# Patient Record
Sex: FEMALE | Race: WHITE | NOT HISPANIC OR LATINO | Employment: OTHER | URBAN - METROPOLITAN AREA
[De-identification: names, ages, dates, MRNs, and addresses within clinical notes are randomized per-mention and may not be internally consistent; named-entity substitution may affect disease eponyms.]

---

## 2017-01-03 ENCOUNTER — INFUSION THERAPY VISIT (OUTPATIENT)
Dept: INFUSION THERAPY | Facility: CLINIC | Age: 67
End: 2017-01-03
Attending: INTERNAL MEDICINE
Payer: MEDICARE

## 2017-01-03 ENCOUNTER — ONCOLOGY VISIT (OUTPATIENT)
Dept: ONCOLOGY | Facility: CLINIC | Age: 67
End: 2017-01-03
Payer: COMMERCIAL

## 2017-01-03 VITALS
HEIGHT: 66 IN | HEART RATE: 96 BPM | DIASTOLIC BLOOD PRESSURE: 60 MMHG | WEIGHT: 138.8 LBS | OXYGEN SATURATION: 97 % | SYSTOLIC BLOOD PRESSURE: 132 MMHG | RESPIRATION RATE: 16 BRPM | BODY MASS INDEX: 22.31 KG/M2 | TEMPERATURE: 97.8 F

## 2017-01-03 VITALS — HEART RATE: 88 BPM | SYSTOLIC BLOOD PRESSURE: 137 MMHG | DIASTOLIC BLOOD PRESSURE: 73 MMHG

## 2017-01-03 DIAGNOSIS — C50.412 MALIGNANT NEOPLASM OF UPPER-OUTER QUADRANT OF LEFT FEMALE BREAST (H): Primary | ICD-10-CM

## 2017-01-03 DIAGNOSIS — Z51.11 ENCOUNTER FOR ANTINEOPLASTIC CHEMOTHERAPY: Primary | ICD-10-CM

## 2017-01-03 DIAGNOSIS — F43.21 ADJUSTMENT DISORDER WITH DEPRESSED MOOD: Primary | ICD-10-CM

## 2017-01-03 DIAGNOSIS — C50.412 MALIGNANT NEOPLASM OF UPPER-OUTER QUADRANT OF LEFT FEMALE BREAST (H): ICD-10-CM

## 2017-01-03 DIAGNOSIS — Z51.11 ENCOUNTER FOR ANTINEOPLASTIC CHEMOTHERAPY: ICD-10-CM

## 2017-01-03 LAB
ALBUMIN SERPL-MCNC: 4 G/DL (ref 3.4–5)
ALP SERPL-CCNC: 129 U/L (ref 40–150)
ALT SERPL W P-5'-P-CCNC: 53 U/L (ref 0–50)
ANION GAP SERPL CALCULATED.3IONS-SCNC: 9 MMOL/L (ref 3–14)
AST SERPL W P-5'-P-CCNC: 29 U/L (ref 0–45)
BASOPHILS # BLD AUTO: 0 10E9/L (ref 0–0.2)
BASOPHILS NFR BLD AUTO: 0.1 %
BILIRUB SERPL-MCNC: 0.2 MG/DL (ref 0.2–1.3)
BUN SERPL-MCNC: 20 MG/DL (ref 7–30)
CALCIUM SERPL-MCNC: 9.5 MG/DL (ref 8.5–10.1)
CHLORIDE SERPL-SCNC: 104 MMOL/L (ref 94–109)
CO2 SERPL-SCNC: 26 MMOL/L (ref 20–32)
CREAT SERPL-MCNC: 0.77 MG/DL (ref 0.52–1.04)
DIFFERENTIAL METHOD BLD: ABNORMAL
EOSINOPHIL # BLD AUTO: 0 10E9/L (ref 0–0.7)
EOSINOPHIL NFR BLD AUTO: 0 %
ERYTHROCYTE [DISTWIDTH] IN BLOOD BY AUTOMATED COUNT: 16.5 % (ref 10–15)
GFR SERPL CREATININE-BSD FRML MDRD: 75 ML/MIN/1.7M2
GLUCOSE SERPL-MCNC: 126 MG/DL (ref 70–99)
HCT VFR BLD AUTO: 33.1 % (ref 35–47)
HGB BLD-MCNC: 10.5 G/DL (ref 11.7–15.7)
IMM GRANULOCYTES # BLD: 0 10E9/L (ref 0–0.4)
IMM GRANULOCYTES NFR BLD: 0.3 %
LYMPHOCYTES # BLD AUTO: 0.4 10E9/L (ref 0.8–5.3)
LYMPHOCYTES NFR BLD AUTO: 2.9 %
MCH RBC QN AUTO: 31.3 PG (ref 26.5–33)
MCHC RBC AUTO-ENTMCNC: 31.7 G/DL (ref 31.5–36.5)
MCV RBC AUTO: 99 FL (ref 78–100)
MONOCYTES # BLD AUTO: 0 10E9/L (ref 0–1.3)
MONOCYTES NFR BLD AUTO: 0.2 %
NEUTROPHILS # BLD AUTO: 12.8 10E9/L (ref 1.6–8.3)
NEUTROPHILS NFR BLD AUTO: 96.5 %
PLATELET # BLD AUTO: 343 10E9/L (ref 150–450)
POTASSIUM SERPL-SCNC: 3.9 MMOL/L (ref 3.4–5.3)
PROT SERPL-MCNC: 7.4 G/DL (ref 6.8–8.8)
RBC # BLD AUTO: 3.36 10E12/L (ref 3.8–5.2)
SODIUM SERPL-SCNC: 139 MMOL/L (ref 133–144)
WBC # BLD AUTO: 13.3 10E9/L (ref 4–11)

## 2017-01-03 PROCEDURE — 25000128 H RX IP 250 OP 636: Performed by: INTERNAL MEDICINE

## 2017-01-03 PROCEDURE — 96375 TX/PRO/DX INJ NEW DRUG ADDON: CPT

## 2017-01-03 PROCEDURE — 99214 OFFICE O/P EST MOD 30 MIN: CPT | Performed by: INTERNAL MEDICINE

## 2017-01-03 PROCEDURE — 96415 CHEMO IV INFUSION ADDL HR: CPT

## 2017-01-03 PROCEDURE — 96417 CHEMO IV INFUS EACH ADDL SEQ: CPT

## 2017-01-03 PROCEDURE — 96377 APPLICATON ON-BODY INJECTOR: CPT

## 2017-01-03 PROCEDURE — 25000125 ZZHC RX 250: Performed by: INTERNAL MEDICINE

## 2017-01-03 PROCEDURE — 80053 COMPREHEN METABOLIC PANEL: CPT | Performed by: INTERNAL MEDICINE

## 2017-01-03 PROCEDURE — 25000130 H RX MED GY IP 250 OP 259 PS 637: Mod: GY | Performed by: INTERNAL MEDICINE

## 2017-01-03 PROCEDURE — 96413 CHEMO IV INFUSION 1 HR: CPT

## 2017-01-03 PROCEDURE — 96367 TX/PROPH/DG ADDL SEQ IV INF: CPT

## 2017-01-03 PROCEDURE — 85025 COMPLETE CBC W/AUTO DIFF WBC: CPT | Performed by: INTERNAL MEDICINE

## 2017-01-03 RX ORDER — DIPHENHYDRAMINE HCL 25 MG
50 CAPSULE ORAL ONCE
Status: COMPLETED | OUTPATIENT
Start: 2017-01-03 | End: 2017-01-03

## 2017-01-03 RX ORDER — VENLAFAXINE HYDROCHLORIDE 37.5 MG/1
37.5 CAPSULE, EXTENDED RELEASE ORAL DAILY
Qty: 30 CAPSULE | Refills: 1 | Status: SHIPPED | OUTPATIENT
Start: 2017-01-03 | End: 2017-03-02

## 2017-01-03 RX ORDER — PROCHLORPERAZINE MALEATE 10 MG
10 TABLET ORAL EVERY 6 HOURS PRN
Qty: 30 TABLET | Refills: 3 | Status: SHIPPED | OUTPATIENT
Start: 2017-01-03 | End: 2017-02-06

## 2017-01-03 RX ORDER — HEPARIN SODIUM (PORCINE) LOCK FLUSH IV SOLN 100 UNIT/ML 100 UNIT/ML
500 SOLUTION INTRAVENOUS EVERY 8 HOURS
Status: DISCONTINUED | OUTPATIENT
Start: 2017-01-03 | End: 2017-01-03 | Stop reason: HOSPADM

## 2017-01-03 RX ADMIN — DEXAMETHASONE SODIUM PHOSPHATE 20 MG: 10 INJECTION, SOLUTION INTRAMUSCULAR; INTRAVENOUS at 11:19

## 2017-01-03 RX ADMIN — PACLITAXEL 300 MG: 6 INJECTION, SOLUTION INTRAVENOUS at 12:24

## 2017-01-03 RX ADMIN — Medication 126 MG: at 15:41

## 2017-01-03 RX ADMIN — SODIUM CHLORIDE, PRESERVATIVE FREE 500 UNITS: 5 INJECTION INTRAVENOUS at 16:29

## 2017-01-03 RX ADMIN — SODIUM CHLORIDE 250 ML: 9 INJECTION, SOLUTION INTRAVENOUS at 11:15

## 2017-01-03 RX ADMIN — PEGFILGRASTIM 6 MG: KIT SUBCUTANEOUS at 16:24

## 2017-01-03 RX ADMIN — FAMOTIDINE 20 MG: 20 INJECTION, SOLUTION INTRAVENOUS at 11:38

## 2017-01-03 RX ADMIN — DIPHENHYDRAMINE HYDROCHLORIDE 50 MG: 25 CAPSULE ORAL at 11:40

## 2017-01-03 ASSESSMENT — PAIN SCALES - GENERAL: PAINLEVEL: NO PAIN (0)

## 2017-01-03 NOTE — NURSING NOTE
DISCHARGE PLAN:  Next appointments: See patient instruction section  Departure Mode: Ambulatory  Accompanied by: friends  8 minutes for nursing discharge (face to face time)     Michaela Dorman is here today for Oncology follow up for breast cancer.  Writing nurse seen patient after Medical Oncology appointment to address questions/concerns/coordinate care. Patient to follow up with next cycle of chemotherapy.  Patient is scheduled for Mastectomy the first week of February and per Dr. Ferguson this is going to need to be moved back by one week.  Patient reports that she was off by one week when scheduling.  Message sent to surgery scheduler. Appointments scheduled. See patient instructions and Oncologist's Progress note for further details. Questions and concerns addressed to patient's satisfaction. Patient verbalized and demonstrated understanding of plan.  Contact information provided and patient is encouraged to call with any that arise in the interim of care.    Juan José Hernandez, RN, BSN, OCN   Oncology Care Coordinator RN  [facility]  [phone]  1/3/2017, 11:39 AM

## 2017-01-03 NOTE — ASSESSMENT & PLAN NOTE
Michaela Dorman  initially felt a mass in her left axilla. She then underwent mammogram which showed dense breasts with no suspicious lesions in 4/2016. She was then seen by surgery and underwent excisional LN biopsy that showed metastatic high-grade poorly differentiated carcinoma with a tumor size of 2.5 cm. Immunohistochemistry was done for ER/KS receptors that came back both negative. HER-2/boubacar was amplified by FISH. She then underwent MRI of the breast on 9/26/2016 that showed suspicious abnormality with multicentric left sided breast cancer that involved the left lateral breast form the nipple to the chest wall. A PET scan was obtained on 10/5/2016 that showed a hypermetabolic opacity in the left axilla wihtout other pathological activity. It was then recommended that she undergo neoadjuvant chemotherapy with Cytoxan and Doxorubicin that will be followed by Taxol, Herceptin. Patient initiated treatment on 10/11/2016.

## 2017-01-03 NOTE — PROGRESS NOTES
Hematology/ Oncology Follow-up Visit:  Sunny 3, 2017    Reason for Visit:   Chief Complaint   Patient presents with     Oncology Clinic Visit     2 week follow up- breast cancer       Oncologic History:  Malignant neoplasm of upper-outer quadrant of left female breast (H)  Michaela Dorman  initially felt a mass in her left axilla. She then underwent mammogram which showed dense breasts with no suspicious lesions in 4/2016. She was then seen by surgery and underwent excisional LN biopsy that showed metastatic high-grade poorly differentiated carcinoma with a tumor size of 2.5 cm. Immunohistochemistry was done for ER/FL receptors that came back both negative. HER-2/boubacar was amplified by FISH. She then underwent MRI of the breast on 9/26/2016 that showed suspicious abnormality with multicentric left sided breast cancer that involved the left lateral breast form the nipple to the chest wall. A PET scan was obtained on 10/5/2016 that showed a hypermetabolic opacity in the left axilla wihtout other pathological activity. It was then recommended that she undergo neoadjuvant chemotherapy with Cytoxan and Doxorubicin that will be followed by Taxol, Herceptin. Patient initiated treatment on 10/11/2016.         Interval History:  Patient is returning today for follow-up. She has been tolerating chemotherapy well without any significant complaints of nausea vomiting diarrhea or neuropathy. She is currently receiving Taxol and Herceptin.    Review Of Systems:  Constitutional: Negative for fever, chills, and night sweats.  Skin: negative.  Eyes: negative.  Ears/Nose/Throat: negative.  Respiratory: No shortness of breath, dyspnea on exertion, cough, or hemoptysis.  Cardiovascular: negative.  Gastrointestinal: negative.  Genitourinary: negative.  Musculoskeletal: negative.  Neurologic: negative.  Psychiatric: negative.  Hematologic/Lymphatic/Immunologic: negative.  Endocrine: negative.    All other ROS negative unless mentioned in  interval history.    Past medical, social, surgical, and family histories reviewed.    Allergies:  Allergies as of 01/03/2017 - reviewed 01/03/2017   Allergen Reaction Noted     No known drug allergies  07/05/2002       Current Medications:  Current Outpatient Prescriptions   Medication Sig Dispense Refill     venlafaxine (EFFEXOR XR) 37.5 MG 24 hr capsule Take 1 capsule (37.5 mg) by mouth daily 30 capsule 1     prochlorperazine (COMPAZINE) 10 MG tablet Take 1 tablet (10 mg) by mouth every 6 hours as needed (Nausea/Vomiting) 30 tablet 3     HYDROcodone-acetaminophen (NORCO) 5-325 MG per tablet Take 1 tablet by mouth every 4 hours as needed for pain Maximum 8 tablet(s) per day 18 tablet 0     LORazepam (ATIVAN) 0.5 MG tablet Take 1 tablet (0.5 mg) by mouth every 4 hours as needed (Anxiety, Nausea/Vomiting or Sleep) 30 tablet 3     hydrochlorothiazide (HYDRODIURIL) 25 MG tablet TAKE ONE TABLET BY MOUTH ONCE DAILY 90 tablet 1     dexamethasone (DECADRON) 4 MG tablet Take 20mg the night before and the morning of chemotherapy as discussed 30 tablet 0     [DISCONTINUED] venlafaxine (EFFEXOR XR) 37.5 MG 24 hr capsule Take 1 capsule (37.5 mg) by mouth daily 30 capsule 1     DPH-Lido-AlHydr-MgHydr-Simeth (FIRST-MOUTHWASH BLM) SUSP Swish and swallow 5-10 mLs in mouth every 6 hours as needed 237 mL 1     HYDROcodone-acetaminophen (NORCO) 5-325 MG per tablet Take 1-2 tablets by mouth every 4 hours as needed for other (Moderate to Severe Pain) 30 tablet 0     psyllium 0.52 G capsule Take 5 capsules by mouth daily       simvastatin (ZOCOR) 20 MG tablet Take 1 tablet (20 mg) by mouth At Bedtime 90 tablet 1     FLUoxetine (PROZAC) 20 MG capsule TAKE THREE CAPSULES BY MOUTH EVERY DAY TITRATE AS DIRECTED 270 capsule 0     acetaminophen (TYLENOL EX ST ARTHRITIS PAIN) 500 MG tablet Take 500-1,000 mg by mouth every 6 hours as needed for mild pain       omeprazole (PRILOSEC) 20 MG capsule Take by mouth daily       pegfilgrastim  "(NEULASTA) 6 MG/0.6ML injection Inject 0.6 mLs (6 mg) Subcutaneous once for 1 dose 0.6 mL 0        Physical Exam:  /60 mmHg  Pulse 96  Temp(Src) 97.8  F (36.6  C) (Oral)  Resp 16  Ht 1.664 m (5' 5.5\")  Wt 62.959 kg (138 lb 12.8 oz)  BMI 22.74 kg/m2  SpO2 97%  Wt Readings from Last 12 Encounters:   01/03/17 62.959 kg (138 lb 12.8 oz)   12/27/16 63.64 kg (140 lb 4.8 oz)   12/26/16 63.866 kg (140 lb 12.8 oz)   12/21/16 64.139 kg (141 lb 6.4 oz)   12/20/16 63.05 kg (139 lb)   12/13/16 62.324 kg (137 lb 6.4 oz)   12/06/16 62.596 kg (138 lb)   12/06/16 63.005 kg (138 lb 14.4 oz)   11/22/16 62.415 kg (137 lb 9.6 oz)   11/08/16 62.778 kg (138 lb 6.4 oz)   10/25/16 62.9 kg (138 lb 10.7 oz)   10/25/16 62.959 kg (138 lb 12.8 oz)     ECOG performance status: 0  GENERAL APPEARANCE: Healthy, alert and in no acute distress.  HEENT: Sclerae anicteric. PERRLA. Oropharynx without ulcers, lesions, or thrush.  NECK: Supple. No asymmetry or masses.  LYMPHATICS: No palpable cervical, supraclavicular, axillary, or inguinal lymphadenopathy.  RESP: Lungs clear to auscultation bilaterally without rales, rhonchi or wheezes.  BREAST: There is no dominant masses or axillary adenopathy. \" \"CARDIOVASCULAR: Regular rate and rhythm. Normal S1, S2; no S3 or S4. No murmur, gallop, or rub.  ABDOMEN: Soft, nontender. Bowel sounds normal. No palpable organomegaly or masses.  MUSCULOSKELETAL: Extremities without gross deformities noted. No edema of bilateral lower extremities.  SKIN: No suspicious lesions or rashes.  NEURO: Alert and oriented x 3. Cranial nerves II-XII grossly intact.  PSYCHIATRIC: Mentation and affect appear normal.    Laboratory/Imaging Studies:  No visits with results within 2 Week(s) from this visit.  Latest known visit with results is:    Infusion Therapy Visit on 12/20/2016   Component Date Value Ref Range Status     WBC 12/20/2016 6.0  4.0 - 11.0 10e9/L Final     RBC Count 12/20/2016 3.25* 3.8 - 5.2 10e12/L Final     " Hemoglobin 12/20/2016 10.2* 11.7 - 15.7 g/dL Final     Hematocrit 12/20/2016 31.7* 35.0 - 47.0 % Final     MCV 12/20/2016 98  78 - 100 fl Final     MCH 12/20/2016 31.4  26.5 - 33.0 pg Final     MCHC 12/20/2016 32.2  31.5 - 36.5 g/dL Final     RDW 12/20/2016 16.7* 10.0 - 15.0 % Final     Platelet Count 12/20/2016 282  150 - 450 10e9/L Final     Diff Method 12/20/2016 Automated Method   Final     % Neutrophils 12/20/2016 91.2   Final     % Lymphocytes 12/20/2016 6.5   Final     % Monocytes 12/20/2016 1.5   Final     % Eosinophils 12/20/2016 0.3   Final     % Basophils 12/20/2016 0.2   Final     % Immature Granulocytes 12/20/2016 0.3   Final     Absolute Neutrophil 12/20/2016 5.5  1.6 - 8.3 10e9/L Final     Absolute Lymphocytes 12/20/2016 0.4* 0.8 - 5.3 10e9/L Final     Absolute Monocytes 12/20/2016 0.1  0.0 - 1.3 10e9/L Final     Absolute Eosinophils 12/20/2016 0.0  0.0 - 0.7 10e9/L Final     Absolute Basophils 12/20/2016 0.0  0.0 - 0.2 10e9/L Final     Abs Immature Granulocytes 12/20/2016 0.0  0 - 0.4 10e9/L Final        Recent Results (from the past 744 hour(s))   MR Breast Bilateral w/o & w Contrast    Narrative    Breast MRI 12/5/2016.    Comparisons: Breast MRI 9/26/2016, mammogram 2/26/2016, PET/CT  10/5/2016    HISTORY:    66-year-old female who underwent excisional lymph node  biopsy that showed metastatic high-grade poorly differentiated  carcinoma. MRI the breast 9/26/2016 showed suspicious findings of  abnormal enhancement throughout the lateral left breast from the  nipple to the chest wall with multiple small masses and clumped areas  of non-mass enhancement.    The largest was in the 2:00 position near the chest wall measuring 9  mm. Patient has been undergoing neoadjuvant chemotherapy. She presents  for response to treatment.    TECHNIQUE: Axial T2 images and axial T1 fat saturation images were  obtained of both breasts prior to gadolinium administration. Following  the uneventful administration of 0.1  mmol/kg of Magnevist  intravenously, dynamic imaging of both breasts was performed in the  axial plane at 2, 4 and 6 minutes. High resolution sagittal T1  series  was obtained through both breasts after gadolinium. Dynamic images are  reviewed with subtraction technique. Axial, coronal and sagittal  maximal intensity projection images are displayed.    FINDINGS:    There is mild background enhancement.      RIGHT BREAST: No suspicious findings in the right breast.    LEFT BREAST: Interval decrease in the previously seen presumed  malignant nonmass enhancement and multiple small masses within the  left lateral breast. Specifically, the previously seen enhancing mass  in the left lateral breast 7 cm from the nipple along the left chest  wall, previously measured 0.9 cm, currently measures 0.5 cm (series 15  image 87).    Slightly more inferior, clumped nonmass enhancement on the prior study  is less well seen. There is a residual enhancing mass in the left  breast 4:00 position 5 cm from the nipple measuring 0.6 cm compared to  0.9 cm on prior study (16/67). There is small amount of residual  surrounding clumped nonmass enhancement. Previously seen clumped  nonmass enhancement just posterior to the left nipple has decreased  and is not well seen.     AXILLA: No suspicious appearing axillary lymph node is identified.  Left axillary previously seen seroma has resolved. There are  postsurgical changes in the left axilla from prior excisional biopsy.      Impression    IMPRESSION: BI-RADS CATEGORY: 6 - Known Biopsy-Proven  Malignancy-Appropriate Action Should Be Taken    1. Presumed malignant left breast clumped nonmass enhancement and  multiple small masses in the left lateral breast demonstrated interval  decrease in size and conspicuity compared with prior study 9/26/2016.  2. Status post left axillary geraldine excision which revealed malignancy,  stable postbiopsy changes. Previously seen seroma has  resolved.    RECOMMENDED FOLLOW-UP: Recommend oncology and surgical follow-up..        MISAEL AMATO MD       Assessment and plan:  (C50.412) Malignant neoplasm of upper-outer quadrant of left female breast (H)  (primary encounter diagnosis)  Patient will proceed today with his chemotherapy was Taxol and Herceptin according to the treatment plan. I reviewed with the patient the management in details. Following cycle #4 of Taxol the patient will be scheduled for surgery( mastectomy). Patient will be also getting postmastectomy radiation therapy. Patient will continue on Herceptin to complete one year of adjuvant therapy. I will see the patient again in 2 weeks' time or sooner if there is new developments or concerns.    (Z51.11) Encounter for antineoplastic chemotherapy  Plan: prochlorperazine (COMPAZINE) 10 MG tablet    The patient is ready to learn, no apparent learning barriers were identified.  Diagnosis and treatment plans were explained to the patient. The patient expressed understanding of the content. The patient asked appropriate questions. The patient questions were answered to her satisfaction.    Chart documentation with Dragon Voice recognition Software. Although reviewed after completion, some words and grammatical errors may remain.

## 2017-01-03 NOTE — NURSING NOTE
"Michaela Dorman is a 66 year old female who presents for:  Chief Complaint   Patient presents with     Oncology Clinic Visit     2 week follow up- breast cancer        Initial Vitals:  /60 mmHg  Pulse 96  Temp(Src) 97.8  F (36.6  C) (Oral)  Resp 16  Ht 1.664 m (5' 5.5\")  Wt 62.959 kg (138 lb 12.8 oz)  BMI 22.74 kg/m2  SpO2 97% Estimated body mass index is 22.74 kg/(m^2) as calculated from the following:    Height as of this encounter: 1.664 m (5' 5.5\").    Weight as of this encounter: 62.959 kg (138 lb 12.8 oz).. Body surface area is 1.71 meters squared. BP completed using cuff size: regular    No Pain (0) No LMP recorded. Patient is postmenopausal. Allergies and medications reviewed.     Medications: MEDICATION REFILLS NEEDED TODAY.  Pharmacy name entered into EPIC:    [facility] - [facility] [address] [place] [address] - [address]  [facility] [place], [place] - [address] [facility] [place], [place] - [address]     Comments:     5 minutes for nursing intake (face to face time)   Solange Cleveland          "

## 2017-01-03 NOTE — MR AVS SNAPSHOT
After Visit Summary   1/3/2017    Michaela Dorman    MRN: [id number]           Patient Information     Date Of Birth          1950        Visit Information        Provider Department      1/3/2017 10:15 AM Syeda Ferguson MD [facility]        Today's Diagnoses     Malignant neoplasm of upper-outer quadrant of left female breast (H)    -  1     Encounter for antineoplastic chemotherapy           Care Instructions      Please follow up with Dr. Ferguson prior to last chemotherapy.  Please schedule labs in infusion prior to follow up appointment.    We will send a message to Dr. Blanco's nurse to reschedule surgery.  If you don't hear anything from them in the next couple days please call us.  You can leave the Pre-op scheduled as is.    Last chemotherapy on 1/17/17 and will continue Herceptin every 3 weeks for a total of 1 year.    Follow Up Date/Time: 1/17/17 at 8:45    If you have any questions or concerns please feel free to call.    Juan José Hernandez, RN, BSN   Oncology Care Coordinator RN  [facility]  [phone]            Follow-ups after your visit        Your next 10 appointments already scheduled     Jan 03, 2017 11:00 AM   Level 4 with NL INFUSION CHAIR 4   [facility])    [address] [place] [address] [place] [address]   [phone]            Sunny 10, 2017 11:00 AM   Level 2 with NL INFUSION CHAIR 4   [facility])    [address] [place] [address] [place] [address]   [phone]            Jan 17, 2017  8:45 AM   Return Visit with Syeda Ferguson MD   [facility] ([facility])    [address] [place] [address]   [phone]            Jan 24, 2017 10:00 AM   Pre-Op physical with Phani Tapia PA-C   [facility] ([facility])    [address] [facility] [id number] [phone]             Feb 01, 2017   Procedure with Keyon Blanco MD   [facility] ([facility])    [address]  [place] [id number] [phone]           From [address]: Exit at [address] on south side of [place]. Turn right on [address]. Turn left at stoplight on [address]. [facility] will be in view two blocks ahead            Feb 09, 2017 10:30 AM   Return Visit with Keyon Blanco MD   [facility] ([facility])    [address] [facility] [address] [phone]            Feb 14, 2017  9:15 AM   Return Visit with Loren Felipe MD   [facility] ([facility])    [address] [place] [address] [phone]              Who to contact     If you have questions or need follow up information about today's clinic visit or your schedule please contact [facility] directly at [phone].  Normal or non-critical lab and imaging results will be communicated to you by [employer], letter or phone within 4 business days after the clinic has received the results. If you do not hear from us within 7 days, please contact the clinic through [employer] or phone. If you have a critical or abnormal lab result, we will notify you by phone as soon as possible.  Submit refill requests through [employer] or call your pharmacy and they will forward the refill request to us. Please allow 3 business days for your refill to be completed.          Additional Information About Your Visit        [employer] Information     [employer] gives you secure access to your electronic health record. If you see a primary care provider, you can also send messages to your care team and make appointments. If you have questions, please call your primary care clinic.  If you do not have a primary care provider, please call [phone] and they will assist you.        Care EveryWhere ID     This is your  "Care EveryWhere ID. This could be used by other organizations to access your [facility] medical records  [id number]        Your Vitals Were     Pulse Temperature Respirations Height BMI (Body Mass Index) Pulse Oximetry    96 97.8  F (36.6  C) (Oral) 16 1.664 m (5' 5.5\") 22.74 kg/m2 97%       Blood Pressure from Last 3 Encounters:   01/03/17 132/60   12/27/16 117/68   12/21/16 127/70    Weight from Last 3 Encounters:   01/03/17 62.959 kg (138 lb 12.8 oz)   12/27/16 63.64 kg (140 lb 4.8 oz)   12/26/16 63.866 kg (140 lb 12.8 oz)              Today, you had the following     No orders found for display         Where to get your medicines      These medications were sent to [facility] [place] - [place] - [address]   [address] , [facility] [address]     Phone:  [phone]    - prochlorperazine 10 MG tablet  - venlafaxine 37.5 MG 24 hr capsule       Primary Care Provider Office Phone # Fax #    Phani Tapia PA-C [phone]       [facility] [address] [facility] [address]        Thank you!     Thank you for choosing [facility]  for your care. Our goal is always to provide you with excellent care. Hearing back from our patients is one way we can continue to improve our services. Please take a few minutes to complete the written survey that you may receive in the mail after your visit with us. Thank you!             Your Updated Medication List - Protect others around you: Learn how to safely use, store and throw away your medicines at www.disposemymeds.org.          This list is accurate as of: 1/3/17 10:55 AM.  Always use your most recent med list.                   Brand Name Dispense Instructions for use    dexamethasone 4 MG tablet    DECADRON    30 tablet    Take 20mg the night before and the morning of chemotherapy as discussed       FIRST-MOUTHWASH BLM Susp     237 mL    Swish and swallow 5-10 mLs in mouth every 6 hours as needed       " FLUoxetine 20 MG capsule    PROzac    270 capsule    TAKE THREE CAPSULES BY MOUTH EVERY DAY TITRATE AS DIRECTED       hydrochlorothiazide 25 MG tablet    HYDRODIURIL    90 tablet    TAKE ONE TABLET BY MOUTH ONCE DAILY       * HYDROcodone-acetaminophen 5-325 MG per tablet    NORCO    30 tablet    Take 1-2 tablets by mouth every 4 hours as needed for other (Moderate to Severe Pain)       * HYDROcodone-acetaminophen 5-325 MG per tablet    NORCO    18 tablet    Take 1 tablet by mouth every 4 hours as needed for pain Maximum 8 tablet(s) per day       LORazepam 0.5 MG tablet    ATIVAN    30 tablet    Take 1 tablet (0.5 mg) by mouth every 4 hours as needed (Anxiety, Nausea/Vomiting or Sleep)       priLOSEC 20 MG CR capsule   Generic drug:  omeprazole      Take by mouth daily       prochlorperazine 10 MG tablet    COMPAZINE    30 tablet    Take 1 tablet (10 mg) by mouth every 6 hours as needed (Nausea/Vomiting)       psyllium 0.52 G capsule      Take 5 capsules by mouth daily       simvastatin 20 MG tablet    ZOCOR    90 tablet    Take 1 tablet (20 mg) by mouth At Bedtime       TYLENOL EX ST ARTHRITIS PAIN 500 MG tablet   Generic drug:  acetaminophen      Take 500-1,000 mg by mouth every 6 hours as needed for mild pain       venlafaxine 37.5 MG 24 hr capsule    EFFEXOR XR    30 capsule    Take 1 capsule (37.5 mg) by mouth daily       * Notice:  This list has 2 medication(s) that are the same as other medications prescribed for you. Read the directions carefully, and ask your doctor or other care provider to review them with you.

## 2017-01-03 NOTE — PATIENT INSTRUCTIONS
Pt to return on 1/10 for C7D8 Herceptin. Copies of medication list and upcoming appointments given prior to discharge.

## 2017-01-09 ENCOUNTER — TELEPHONE (OUTPATIENT)
Dept: ONCOLOGY | Facility: CLINIC | Age: 67
End: 2017-01-09

## 2017-01-09 DIAGNOSIS — Z51.11 ENCOUNTER FOR ANTINEOPLASTIC CHEMOTHERAPY: Primary | ICD-10-CM

## 2017-01-09 RX ORDER — DEXAMETHASONE 4 MG/1
TABLET ORAL
Qty: 30 TABLET | Refills: 1 | Status: ON HOLD | OUTPATIENT
Start: 2017-01-09 | End: 2017-02-08

## 2017-01-09 NOTE — TELEPHONE ENCOUNTER
Pt called stating she spoke to someone about a refill request she put in but the medication has not been filled.      Pt is requesting a refill on dexamethasone (DECADRON) 4 MG tablet last filled on 11/22/16 with 30 tablets and no refills.     Serena Goodman, CMA

## 2017-01-10 ENCOUNTER — INFUSION THERAPY VISIT (OUTPATIENT)
Dept: INFUSION THERAPY | Facility: CLINIC | Age: 67
End: 2017-01-10
Attending: INTERNAL MEDICINE
Payer: MEDICARE

## 2017-01-10 VITALS
TEMPERATURE: 98.6 F | SYSTOLIC BLOOD PRESSURE: 132 MMHG | HEART RATE: 102 BPM | DIASTOLIC BLOOD PRESSURE: 73 MMHG | OXYGEN SATURATION: 97 % | RESPIRATION RATE: 20 BRPM

## 2017-01-10 DIAGNOSIS — Z51.11 ENCOUNTER FOR ANTINEOPLASTIC CHEMOTHERAPY: Primary | ICD-10-CM

## 2017-01-10 DIAGNOSIS — C50.412 MALIGNANT NEOPLASM OF UPPER-OUTER QUADRANT OF LEFT FEMALE BREAST (H): ICD-10-CM

## 2017-01-10 PROCEDURE — 25000125 ZZHC RX 250: Performed by: INTERNAL MEDICINE

## 2017-01-10 PROCEDURE — 96413 CHEMO IV INFUSION 1 HR: CPT

## 2017-01-10 PROCEDURE — 25000128 H RX IP 250 OP 636: Performed by: INTERNAL MEDICINE

## 2017-01-10 RX ORDER — HEPARIN SODIUM (PORCINE) LOCK FLUSH IV SOLN 100 UNIT/ML 100 UNIT/ML
500 SOLUTION INTRAVENOUS EVERY 8 HOURS
Status: DISCONTINUED | OUTPATIENT
Start: 2017-01-10 | End: 2017-01-10 | Stop reason: HOSPADM

## 2017-01-10 RX ADMIN — SODIUM CHLORIDE 250 ML: 9 INJECTION, SOLUTION INTRAVENOUS at 12:32

## 2017-01-10 RX ADMIN — Medication 126 MG: at 12:54

## 2017-01-10 RX ADMIN — SODIUM CHLORIDE, PRESERVATIVE FREE 500 UNITS: 5 INJECTION INTRAVENOUS at 13:56

## 2017-01-10 ASSESSMENT — PAIN SCALES - GENERAL: PAINLEVEL: NO PAIN (0)

## 2017-01-10 NOTE — PATIENT INSTRUCTIONS
Pt to return on 1/17/17  for C8D1. Copies of medication list and upcoming appointments given prior to discharge.

## 2017-01-10 NOTE — PROGRESS NOTES
Patient here for C7D8  chemotherapy today. BSA/Dose verified by 2 RN'S.   Tolerated chemotherapy well.  Positive blood return pre/post infusion. Discharged in stable condition.

## 2017-01-17 ENCOUNTER — INFUSION THERAPY VISIT (OUTPATIENT)
Dept: INFUSION THERAPY | Facility: CLINIC | Age: 67
End: 2017-01-17
Attending: INTERNAL MEDICINE
Payer: MEDICARE

## 2017-01-17 ENCOUNTER — ONCOLOGY VISIT (OUTPATIENT)
Dept: ONCOLOGY | Facility: CLINIC | Age: 67
End: 2017-01-17
Payer: COMMERCIAL

## 2017-01-17 VITALS
BODY MASS INDEX: 22.19 KG/M2 | WEIGHT: 138.1 LBS | SYSTOLIC BLOOD PRESSURE: 120 MMHG | TEMPERATURE: 98 F | RESPIRATION RATE: 20 BRPM | OXYGEN SATURATION: 94 % | HEIGHT: 66 IN | DIASTOLIC BLOOD PRESSURE: 68 MMHG | HEART RATE: 111 BPM

## 2017-01-17 VITALS — HEART RATE: 96 BPM | SYSTOLIC BLOOD PRESSURE: 140 MMHG | DIASTOLIC BLOOD PRESSURE: 78 MMHG

## 2017-01-17 DIAGNOSIS — Z51.11 ENCOUNTER FOR ANTINEOPLASTIC CHEMOTHERAPY: Primary | ICD-10-CM

## 2017-01-17 DIAGNOSIS — C50.412 MALIGNANT NEOPLASM OF UPPER-OUTER QUADRANT OF LEFT FEMALE BREAST (H): Primary | ICD-10-CM

## 2017-01-17 DIAGNOSIS — C50.412 MALIGNANT NEOPLASM OF UPPER-OUTER QUADRANT OF LEFT FEMALE BREAST (H): ICD-10-CM

## 2017-01-17 LAB
BASOPHILS # BLD AUTO: 0 10E9/L (ref 0–0.2)
BASOPHILS NFR BLD AUTO: 0.1 %
DIFFERENTIAL METHOD BLD: ABNORMAL
EOSINOPHIL # BLD AUTO: 0 10E9/L (ref 0–0.7)
EOSINOPHIL NFR BLD AUTO: 0 %
ERYTHROCYTE [DISTWIDTH] IN BLOOD BY AUTOMATED COUNT: 15.4 % (ref 10–15)
HCT VFR BLD AUTO: 33.3 % (ref 35–47)
HGB BLD-MCNC: 10.8 G/DL (ref 11.7–15.7)
IMM GRANULOCYTES # BLD: 0.2 10E9/L (ref 0–0.4)
IMM GRANULOCYTES NFR BLD: 1 %
LYMPHOCYTES # BLD AUTO: 0.5 10E9/L (ref 0.8–5.3)
LYMPHOCYTES NFR BLD AUTO: 3.1 %
MCH RBC QN AUTO: 32.4 PG (ref 26.5–33)
MCHC RBC AUTO-ENTMCNC: 32.4 G/DL (ref 31.5–36.5)
MCV RBC AUTO: 100 FL (ref 78–100)
MONOCYTES # BLD AUTO: 0 10E9/L (ref 0–1.3)
MONOCYTES NFR BLD AUTO: 0.1 %
NEUTROPHILS # BLD AUTO: 15 10E9/L (ref 1.6–8.3)
NEUTROPHILS NFR BLD AUTO: 95.7 %
PLATELET # BLD AUTO: 328 10E9/L (ref 150–450)
RBC # BLD AUTO: 3.33 10E12/L (ref 3.8–5.2)
WBC # BLD AUTO: 15.7 10E9/L (ref 4–11)

## 2017-01-17 PROCEDURE — 25000125 ZZHC RX 250: Performed by: INTERNAL MEDICINE

## 2017-01-17 PROCEDURE — 99214 OFFICE O/P EST MOD 30 MIN: CPT | Performed by: INTERNAL MEDICINE

## 2017-01-17 PROCEDURE — 96367 TX/PROPH/DG ADDL SEQ IV INF: CPT

## 2017-01-17 PROCEDURE — 96417 CHEMO IV INFUS EACH ADDL SEQ: CPT

## 2017-01-17 PROCEDURE — 85025 COMPLETE CBC W/AUTO DIFF WBC: CPT | Performed by: INTERNAL MEDICINE

## 2017-01-17 PROCEDURE — 96413 CHEMO IV INFUSION 1 HR: CPT

## 2017-01-17 PROCEDURE — 25000130 H RX MED GY IP 250 OP 259 PS 637: Mod: GY | Performed by: INTERNAL MEDICINE

## 2017-01-17 PROCEDURE — 96415 CHEMO IV INFUSION ADDL HR: CPT

## 2017-01-17 PROCEDURE — 96375 TX/PRO/DX INJ NEW DRUG ADDON: CPT

## 2017-01-17 PROCEDURE — 25000128 H RX IP 250 OP 636: Performed by: INTERNAL MEDICINE

## 2017-01-17 RX ORDER — DIPHENHYDRAMINE HCL 25 MG
50 CAPSULE ORAL ONCE
Status: COMPLETED | OUTPATIENT
Start: 2017-01-17 | End: 2017-01-17

## 2017-01-17 RX ORDER — HEPARIN SODIUM (PORCINE) LOCK FLUSH IV SOLN 100 UNIT/ML 100 UNIT/ML
500 SOLUTION INTRAVENOUS EVERY 8 HOURS
Status: DISCONTINUED | OUTPATIENT
Start: 2017-01-17 | End: 2017-01-17 | Stop reason: HOSPADM

## 2017-01-17 RX ADMIN — DIPHENHYDRAMINE HYDROCHLORIDE 50 MG: 25 CAPSULE ORAL at 09:15

## 2017-01-17 RX ADMIN — SODIUM CHLORIDE, PRESERVATIVE FREE 500 UNITS: 5 INJECTION INTRAVENOUS at 14:36

## 2017-01-17 RX ADMIN — Medication 126 MG: at 10:10

## 2017-01-17 RX ADMIN — FAMOTIDINE 20 MG: 20 INJECTION, SOLUTION INTRAVENOUS at 09:46

## 2017-01-17 RX ADMIN — DEXAMETHASONE SODIUM PHOSPHATE 20 MG: 10 INJECTION, SOLUTION INTRAMUSCULAR; INTRAVENOUS at 09:26

## 2017-01-17 RX ADMIN — PACLITAXEL 300 MG: 6 INJECTION, SOLUTION INTRAVENOUS at 11:06

## 2017-01-17 RX ADMIN — SODIUM CHLORIDE 250 ML: 9 INJECTION, SOLUTION INTRAVENOUS at 09:05

## 2017-01-17 ASSESSMENT — PAIN SCALES - GENERAL: PAINLEVEL: NO PAIN (0)

## 2017-01-17 NOTE — PATIENT INSTRUCTIONS
Pt to return on 1/24 for Herceptin. Copies of medication list and upcoming appointments given prior to discharge.

## 2017-01-17 NOTE — ASSESSMENT & PLAN NOTE
Michaela Dorman  initially felt a mass in her left axilla. She then underwent mammogram which showed dense breasts with no suspicious lesions in 4/2016. She was then seen by surgery and underwent excisional LN biopsy that showed metastatic high-grade poorly differentiated carcinoma with a tumor size of 2.5 cm. Immunohistochemistry was done for ER/PA receptors that came back both negative. HER-2/boubacar was amplified by FISH. She then underwent MRI of the breast on 9/26/2016 that showed suspicious abnormality with multicentric left sided breast cancer that involved the left lateral breast form the nipple to the chest wall. A PET scan was obtained on 10/5/2016 that showed a hypermetabolic opacity in the left axilla wihtout other pathological activity. It was then recommended that she undergo neoadjuvant chemotherapy with Cytoxan and Doxorubicin that will be followed by Taxol, Herceptin. Patient initiated treatment on 10/11/2016.

## 2017-01-17 NOTE — NURSING NOTE
DISCHARGE PLAN:  Next appointments: See patient instruction section  Departure Mode: Ambulatory  Accompanied by: self  5 minutes for nursing discharge (face to face time)     Michaela Dorman is here today for Oncology follow up.  Writing nurse seen patient after Medical Oncology appointment to address questions/concerns/coordinate care. Patient to continue with Herceptin every 3 weeks and will schedule with infusion.  Follow up 2 weeks after surgery. Patient ambulated by nurse to  to schedule follow up and/or lab appointments. See patient instructions and Oncologist's Progress note for further details. Questions and concerns addressed to patient's satisfaction. Patient verbalized and demonstrated understanding of plan.  Contact information provided and patient is encouraged to call with any that arise in the interim of care.    Juan José Hernandez, RN, BSN, OCN   Oncology Care Coordinator RN  [facility]  [phone]  1/17/2017, 11:46 AM

## 2017-01-17 NOTE — PROGRESS NOTES
Here for chemotherapy, tolerated well.  Labs/BSA/Dosing verified by RN x2. Positive blood return noted pre and post chemotherapy administration. Pt discharged in stable condition.  No Neulasta On-pro given today as today is her last full treatment prior to maintenance Herceptin.

## 2017-01-17 NOTE — PATIENT INSTRUCTIONS
Please follow up with Dr. Muller or available Oncologist 2 weeks after surgery.  Continue with Herceptin every 3 weeks.      Infusion Date/Time for Herceptin:    Follow Up Date/Time:     If you have any questions or concerns please feel free to call.    Juan José Hernandez RN, BSN   Oncology Care Coordinator RN  [facility]  [phone]

## 2017-01-17 NOTE — MR AVS SNAPSHOT
After Visit Summary   1/17/2017    Michaela Dorman    MRN: [id number]           Patient Information     Date Of Birth          1950        Visit Information        Provider Department      1/17/2017 8:45 AM Syeda Ferguson MD [facility]        Care Instructions      Please follow up with Dr. Muller or available Oncologist 2 weeks after surgery.  Continue with Herceptin every 3 weeks.      Infusion Date/Time for Herceptin:    Follow Up Date/Time:     If you have any questions or concerns please feel free to call.    Juan José Hernandez, RN, BSN   Oncology Care Coordinator RN  [facility]  [phone]            Follow-ups after your visit        Your next 10 appointments already scheduled     Jan 17, 2017  9:30 AM   Level 4 with NL INFUSION CHAIR 1   [facility] ([facility])    [address] [place] [address] [address] [place] [address]   [phone]            Jan 24, 2017 10:00 AM   Pre-Op physical with Phani Tapia PA-C   [facility]    [address] [facility] [address]   [phone]            Jan 24, 2017 11:30 AM   Level 2 with NL INFUSION CHAIR 3   [facility] ([facility])    [address] [facility] [address] [place] [address]   [phone]            Feb 08, 2017   Procedure with Keyon Blanco MD   [facility] ([facility])    [address] [place] [address] [address] [place] [address]   [phone]           From [facility] [address]: Exit at [email or web address] on [place] side of [place]. Turn right on [email or web address]. Turn left at stoplight on [address]. [facility] will be in view two blocks ahead            Feb 14, 2017  9:15 AM   Return Visit with Loren Felipe MD   [facility]    [address] [place] [address]   [phone]            Feb 16, 2017 10:00 AM   Return Visit  "with Keyon Blanco MD   [facility]    [address] [facility] [address] [phone]              Who to contact     If you have questions or need follow up information about today's clinic visit or your schedule please contact [facility] directly at [phone].  Normal or non-critical lab and imaging results will be communicated to you by MyChart, letter or phone within 4 business days after the clinic has received the results. If you do not hear from us within 7 days, please contact the clinic through Remarkhart or phone. If you have a critical or abnormal lab result, we will notify you by phone as soon as possible.  Submit refill requests through [employer] or call your pharmacy and they will forward the refill request to us. Please allow 3 business days for your refill to be completed.          Additional Information About Your Visit        Remarkhart Information     [employer] gives you secure access to your electronic health record. If you see a primary care provider, you can also send messages to your care team and make appointments. If you have questions, please call your primary care clinic.  If you do not have a primary care provider, please call [phone] and they will assist you.        Care EveryWhere ID     This is your Care EveryWhere ID. This could be used by other organizations to access your [place] medical records  [id number]        Your Vitals Were     Pulse Temperature Respirations Height BMI (Body Mass Index) Pulse Oximetry    111 98  F (36.7  C) (Oral) 20 1.664 m (5' 5.5\") 22.62 kg/m2 94%       Blood Pressure from Last 3 Encounters:   01/17/17 120/68   01/10/17 132/73   01/03/17 137/73    Weight from Last 3 Encounters:   01/17/17 62.642 kg (138 lb 1.6 oz)   01/03/17 62.959 kg (138 lb 12.8 oz)   12/27/16 63.64 kg (140 lb 4.8 oz)              Today, you had the following     No orders found for display       Primary Care " Provider Office Phone # Fax #    Phani Tapia PA-C [phone]       [facility] [address] [facility], located within [facility] [address]        Thank you!     Thank you for choosing [facility]  for your care. Our goal is always to provide you with excellent care. Hearing back from our patients is one way we can continue to improve our services. Please take a few minutes to complete the written survey that you may receive in the mail after your visit with us. Thank you!             Your Updated Medication List - Protect others around you: Learn how to safely use, store and throw away your medicines at www.disposemymeds.org.          This list is accurate as of: 1/17/17  8:51 AM.  Always use your most recent med list.                   Brand Name Dispense Instructions for use    dexamethasone 4 MG tablet    DECADRON    30 tablet    Take 20mg the night before and the morning of chemotherapy as discussed       FIRST-MOUTHWASH BLM Susp     237 mL    Swish and swallow 5-10 mLs in mouth every 6 hours as needed       FLUoxetine 20 MG capsule    PROzac    270 capsule    TAKE THREE CAPSULES BY MOUTH EVERY DAY TITRATE AS DIRECTED       hydrochlorothiazide 25 MG tablet    HYDRODIURIL    90 tablet    TAKE ONE TABLET BY MOUTH ONCE DAILY       * HYDROcodone-acetaminophen 5-325 MG per tablet    NORCO    30 tablet    Take 1-2 tablets by mouth every 4 hours as needed for other (Moderate to Severe Pain)       * HYDROcodone-acetaminophen 5-325 MG per tablet    NORCO    18 tablet    Take 1 tablet by mouth every 4 hours as needed for pain Maximum 8 tablet(s) per day       LORazepam 0.5 MG tablet    ATIVAN    30 tablet    Take 1 tablet (0.5 mg) by mouth every 4 hours as needed (Anxiety, Nausea/Vomiting or Sleep)       priLOSEC 20 MG CR capsule   Generic drug:  omeprazole      Take by mouth daily       prochlorperazine 10 MG tablet    COMPAZINE    30 tablet    Take 1 tablet (10 mg) by mouth every 6 hours as needed  (Nausea/Vomiting)       psyllium 0.52 G capsule      Take 5 capsules by mouth daily       simvastatin 20 MG tablet    ZOCOR    90 tablet    Take 1 tablet (20 mg) by mouth At Bedtime       TYLENOL EX ST ARTHRITIS PAIN 500 MG tablet   Generic drug:  acetaminophen      Take 500-1,000 mg by mouth every 6 hours as needed for mild pain       venlafaxine 37.5 MG 24 hr capsule    EFFEXOR XR    30 capsule    Take 1 capsule (37.5 mg) by mouth daily       * Notice:  This list has 2 medication(s) that are the same as other medications prescribed for you. Read the directions carefully, and ask your doctor or other care provider to review them with you.

## 2017-01-17 NOTE — NURSING NOTE
"Michaela Dorman is a 66 year old female who presents for:  Chief Complaint   Patient presents with     Oncology Clinic Visit     2 week follow up - breast cancer        Initial Vitals:  /68 mmHg  Pulse 111  Temp(Src) 98  F (36.7  C) (Oral)  Resp 20  Ht 1.664 m (5' 5.5\")  Wt 62.642 kg (138 lb 1.6 oz)  BMI 22.62 kg/m2  SpO2 94% Estimated body mass index is 22.62 kg/(m^2) as calculated from the following:    Height as of this encounter: 1.664 m (5' 5.5\").    Weight as of this encounter: 62.642 kg (138 lb 1.6 oz).. Body surface area is 1.70 meters squared. BP completed using cuff size: regular  No Pain (0) No LMP recorded. Patient is postmenopausal. Allergies and medications reviewed.     Medications: Medication refills not needed today.  Pharmacy name entered into EPIC:    [facility] [place], [place] - [address]  [place] [facility] [place], [place] - [address] [facility] [place], [place] - [address] [facility]     Comments:     5 minutes for nursing intake (face to face time)   Solange Cleveland          "

## 2017-01-17 NOTE — PROGRESS NOTES
Hematology/ Oncology Follow-up Visit:  Jan 17, 2017    Reason for Visit:   Chief Complaint   Patient presents with     Oncology Clinic Visit     2 week follow up - breast cancer       Oncologic History:  Malignant neoplasm of upper-outer quadrant of left female breast (H)  Michaela Dorman  initially felt a mass in her left axilla. She then underwent mammogram which showed dense breasts with no suspicious lesions in 4/2016. She was then seen by surgery and underwent excisional LN biopsy that showed metastatic high-grade poorly differentiated carcinoma with a tumor size of 2.5 cm. Immunohistochemistry was done for ER/MT receptors that came back both negative. HER-2/boubacar was amplified by FISH. She then underwent MRI of the breast on 9/26/2016 that showed suspicious abnormality with multicentric left sided breast cancer that involved the left lateral breast form the nipple to the chest wall. A PET scan was obtained on 10/5/2016 that showed a hypermetabolic opacity in the left axilla wihtout other pathological activity. It was then recommended that she undergo neoadjuvant chemotherapy with Cytoxan and Doxorubicin that will be followed by Taxol, Herceptin. Patient initiated treatment on 10/11/2016.         Interval History:  Patient has been tolerating chemotherapy without significant side effects. She is currently on Taxol and Herceptin. She scheduled to have her surgery in the next 3 weeks. She denies any recent shortness of breath or cough or wheezing. She denies any nausea and vomiting.    Review Of Systems:  Constitutional: Negative for fever, chills, and night sweats.  Skin: negative.  Eyes: negative.  Ears/Nose/Throat: negative.  Respiratory: No shortness of breath, dyspnea on exertion, cough, or hemoptysis.  Cardiovascular: negative.  Gastrointestinal: negative.  Genitourinary: negative.  Musculoskeletal: negative.  Neurologic: negative.  Psychiatric: negative.  Hematologic/Lymphatic/Immunologic:  negative.  Endocrine: negative.    All other ROS negative unless mentioned in interval history.    Past medical, social, surgical, and family histories reviewed.    Allergies:  Allergies as of 01/17/2017 - reviewed 01/17/2017   Allergen Reaction Noted     No known drug allergies  07/05/2002       Current Medications:  Current Outpatient Prescriptions   Medication Sig Dispense Refill     dexamethasone (DECADRON) 4 MG tablet Take 20mg the night before and the morning of chemotherapy as discussed 30 tablet 1     venlafaxine (EFFEXOR XR) 37.5 MG 24 hr capsule Take 1 capsule (37.5 mg) by mouth daily 30 capsule 1     prochlorperazine (COMPAZINE) 10 MG tablet Take 1 tablet (10 mg) by mouth every 6 hours as needed (Nausea/Vomiting) 30 tablet 3     HYDROcodone-acetaminophen (NORCO) 5-325 MG per tablet Take 1 tablet by mouth every 4 hours as needed for pain Maximum 8 tablet(s) per day 18 tablet 0     LORazepam (ATIVAN) 0.5 MG tablet Take 1 tablet (0.5 mg) by mouth every 4 hours as needed (Anxiety, Nausea/Vomiting or Sleep) 30 tablet 3     hydrochlorothiazide (HYDRODIURIL) 25 MG tablet TAKE ONE TABLET BY MOUTH ONCE DAILY 90 tablet 1     DPH-Lido-AlHydr-MgHydr-Simeth (FIRST-MOUTHWASH BLM) SUSP Swish and swallow 5-10 mLs in mouth every 6 hours as needed 237 mL 1     HYDROcodone-acetaminophen (NORCO) 5-325 MG per tablet Take 1-2 tablets by mouth every 4 hours as needed for other (Moderate to Severe Pain) 30 tablet 0     psyllium 0.52 G capsule Take 5 capsules by mouth daily       simvastatin (ZOCOR) 20 MG tablet Take 1 tablet (20 mg) by mouth At Bedtime 90 tablet 1     FLUoxetine (PROZAC) 20 MG capsule TAKE THREE CAPSULES BY MOUTH EVERY DAY TITRATE AS DIRECTED 270 capsule 0     acetaminophen (TYLENOL EX ST ARTHRITIS PAIN) 500 MG tablet Take 500-1,000 mg by mouth every 6 hours as needed for mild pain       omeprazole (PRILOSEC) 20 MG capsule Take by mouth daily       pegfilgrastim (NEULASTA) 6 MG/0.6ML injection Inject 0.6 mLs (6  "mg) Subcutaneous once for 1 dose 0.6 mL 0     [DISCONTINUED] dexamethasone (DECADRON) 4 MG tablet Take 20mg the night before and the morning of chemotherapy as discussed 30 tablet 0        Physical Exam:  /68 mmHg  Pulse 111  Temp(Src) 98  F (36.7  C) (Oral)  Resp 20  Ht 1.664 m (5' 5.5\")  Wt 62.642 kg (138 lb 1.6 oz)  BMI 22.62 kg/m2  SpO2 94%  Wt Readings from Last 12 Encounters:   01/17/17 62.642 kg (138 lb 1.6 oz)   01/03/17 62.959 kg (138 lb 12.8 oz)   12/27/16 63.64 kg (140 lb 4.8 oz)   12/26/16 63.866 kg (140 lb 12.8 oz)   12/21/16 64.139 kg (141 lb 6.4 oz)   12/20/16 63.05 kg (139 lb)   12/13/16 62.324 kg (137 lb 6.4 oz)   12/06/16 62.596 kg (138 lb)   12/06/16 63.005 kg (138 lb 14.4 oz)   11/22/16 62.415 kg (137 lb 9.6 oz)   11/08/16 62.778 kg (138 lb 6.4 oz)   10/25/16 62.9 kg (138 lb 10.7 oz)     ECOG performance status:1  GENERAL APPEARANCE: Healthy, alert and in no acute distress.  HEENT: Sclerae anicteric. PERRLA. Oropharynx without ulcers, lesions, or thrush.  NECK: Supple. No asymmetry or masses.  LYMPHATICS: No palpable cervical, supraclavicular, axillary, or inguinal lymphadenopathy.  RESP: Lungs clear to auscultation bilaterally without rales, rhonchi or wheezes.  CARDIOVASCULAR: Regular rate and rhythm. Normal S1, S2; no S3 or S4. No murmur, gallop, or rub.  ABDOMEN: Soft, nontender. Bowel sounds normal. No palpable organomegaly or masses.  MUSCULOSKELETAL: Extremities without gross deformities noted. No edema of bilateral lower extremities.  SKIN: No suspicious lesions or rashes.  NEURO: Alert and oriented x 3. Cranial nerves II-XII grossly intact.  PSYCHIATRIC: Mentation and affect appear normal.    Laboratory/Imaging Studies:  No visits with results within 2 Week(s) from this visit.  Latest known visit with results is:    Infusion Therapy Visit on 01/03/2017   Component Date Value Ref Range Status     WBC 01/03/2017 13.3* 4.0 - 11.0 10e9/L Final     RBC Count 01/03/2017 3.36* 3.8 " - 5.2 10e12/L Final     Hemoglobin 01/03/2017 10.5* 11.7 - 15.7 g/dL Final     Hematocrit 01/03/2017 33.1* 35.0 - 47.0 % Final     MCV 01/03/2017 99  78 - 100 fl Final     MCH 01/03/2017 31.3  26.5 - 33.0 pg Final     MCHC 01/03/2017 31.7  31.5 - 36.5 g/dL Final     RDW 01/03/2017 16.5* 10.0 - 15.0 % Final     Platelet Count 01/03/2017 343  150 - 450 10e9/L Final     Diff Method 01/03/2017 Automated Method   Final     % Neutrophils 01/03/2017 96.5   Final     % Lymphocytes 01/03/2017 2.9   Final     % Monocytes 01/03/2017 0.2   Final     % Eosinophils 01/03/2017 0.0   Final     % Basophils 01/03/2017 0.1   Final     % Immature Granulocytes 01/03/2017 0.3   Final     Absolute Neutrophil 01/03/2017 12.8* 1.6 - 8.3 10e9/L Final     Absolute Lymphocytes 01/03/2017 0.4* 0.8 - 5.3 10e9/L Final     Absolute Monocytes 01/03/2017 0.0  0.0 - 1.3 10e9/L Final     Absolute Eosinophils 01/03/2017 0.0  0.0 - 0.7 10e9/L Final     Absolute Basophils 01/03/2017 0.0  0.0 - 0.2 10e9/L Final     Abs Immature Granulocytes 01/03/2017 0.0  0 - 0.4 10e9/L Final     Sodium 01/03/2017 139  133 - 144 mmol/L Final     Potassium 01/03/2017 3.9  3.4 - 5.3 mmol/L Final     Chloride 01/03/2017 104  94 - 109 mmol/L Final     Carbon Dioxide 01/03/2017 26  20 - 32 mmol/L Final     Anion Gap 01/03/2017 9  3 - 14 mmol/L Final     Glucose 01/03/2017 126* 70 - 99 mg/dL Final     Urea Nitrogen 01/03/2017 20  7 - 30 mg/dL Final     Creatinine 01/03/2017 0.77  0.52 - 1.04 mg/dL Final     GFR Estimate 01/03/2017 75  >60 mL/min/1.7m2 Final    Non  GFR Calc     GFR Estimate If Black 01/03/2017   >60 mL/min/1.7m2 Final                    Value:>90   GFR Calc       Calcium 01/03/2017 9.5  8.5 - 10.1 mg/dL Final     Bilirubin Total 01/03/2017 0.2  0.2 - 1.3 mg/dL Final     Albumin 01/03/2017 4.0  3.4 - 5.0 g/dL Final     Protein Total 01/03/2017 7.4  6.8 - 8.8 g/dL Final     Alkaline Phosphatase 01/03/2017 129  40 - 150 U/L Final      ALT 01/03/2017 53* 0 - 50 U/L Final     AST 01/03/2017 29  0 - 45 U/L Final          Assessment and plan:    (C50.412) Malignant neoplasm of upper-outer quadrant of left female breast (H)  (primary encounter diagnosis)  Patient will proceed today with his chemotherapy was Taxol. I will see her again 2 weeks following her conclusion of chemotherapy.Patient will continue on adjuvant Herceptin to complete one year of therapy.    The patient is ready to learn, no apparent learning barriers were identified.  Diagnosis and treatment plans were explained to the patient. The patient expressed understanding of the content. The patient asked appropriate questions. The patient questions were answered to her satisfaction.    Chart documentation with Dragon Voice recognition Software. Although reviewed after completion, some words and grammatical errors may remain.

## 2017-01-24 ENCOUNTER — OFFICE VISIT (OUTPATIENT)
Dept: FAMILY MEDICINE | Facility: OTHER | Age: 67
End: 2017-01-24
Payer: COMMERCIAL

## 2017-01-24 ENCOUNTER — INFUSION THERAPY VISIT (OUTPATIENT)
Dept: INFUSION THERAPY | Facility: CLINIC | Age: 67
End: 2017-01-24
Payer: MEDICARE

## 2017-01-24 VITALS
WEIGHT: 140.3 LBS | TEMPERATURE: 98.3 F | BODY MASS INDEX: 27.55 KG/M2 | HEIGHT: 60 IN | HEART RATE: 81 BPM | OXYGEN SATURATION: 97 % | RESPIRATION RATE: 16 BRPM | SYSTOLIC BLOOD PRESSURE: 118 MMHG | DIASTOLIC BLOOD PRESSURE: 63 MMHG

## 2017-01-24 VITALS
HEART RATE: 76 BPM | RESPIRATION RATE: 20 BRPM | HEIGHT: 60 IN | WEIGHT: 140.6 LBS | SYSTOLIC BLOOD PRESSURE: 136 MMHG | BODY MASS INDEX: 27.61 KG/M2 | DIASTOLIC BLOOD PRESSURE: 64 MMHG | TEMPERATURE: 96.8 F

## 2017-01-24 DIAGNOSIS — R73.9 HYPERGLYCEMIA: ICD-10-CM

## 2017-01-24 DIAGNOSIS — Z51.11 ENCOUNTER FOR ANTINEOPLASTIC CHEMOTHERAPY: Primary | ICD-10-CM

## 2017-01-24 DIAGNOSIS — Z01.810 PRE-OPERATIVE CARDIOVASCULAR EXAMINATION: Primary | ICD-10-CM

## 2017-01-24 DIAGNOSIS — Z01.818 PREOP GENERAL PHYSICAL EXAM: ICD-10-CM

## 2017-01-24 DIAGNOSIS — C50.412 MALIGNANT NEOPLASM OF UPPER-OUTER QUADRANT OF LEFT FEMALE BREAST (H): ICD-10-CM

## 2017-01-24 DIAGNOSIS — R53.82 CHRONIC FATIGUE: ICD-10-CM

## 2017-01-24 LAB
ALBUMIN SERPL-MCNC: 4.3 G/DL (ref 3.4–5)
ALP SERPL-CCNC: 80 U/L (ref 40–150)
ALT SERPL W P-5'-P-CCNC: 49 U/L (ref 0–50)
ANION GAP SERPL CALCULATED.3IONS-SCNC: 8 MMOL/L (ref 3–14)
AST SERPL W P-5'-P-CCNC: 22 U/L (ref 0–45)
BASOPHILS # BLD AUTO: 0 10E9/L (ref 0–0.2)
BASOPHILS NFR BLD AUTO: 0.2 %
BILIRUB SERPL-MCNC: 0.4 MG/DL (ref 0.2–1.3)
BUN SERPL-MCNC: 21 MG/DL (ref 7–30)
CALCIUM SERPL-MCNC: 9 MG/DL (ref 8.5–10.1)
CHLORIDE SERPL-SCNC: 102 MMOL/L (ref 94–109)
CO2 SERPL-SCNC: 28 MMOL/L (ref 20–32)
CREAT SERPL-MCNC: 0.92 MG/DL (ref 0.52–1.04)
DIFFERENTIAL METHOD BLD: ABNORMAL
EOSINOPHIL # BLD AUTO: 0.2 10E9/L (ref 0–0.7)
EOSINOPHIL NFR BLD AUTO: 4.3 %
ERYTHROCYTE [DISTWIDTH] IN BLOOD BY AUTOMATED COUNT: 14.6 % (ref 10–15)
GFR SERPL CREATININE-BSD FRML MDRD: 61 ML/MIN/1.7M2
GLUCOSE SERPL-MCNC: 85 MG/DL (ref 70–99)
HBA1C MFR BLD: 5 % (ref 4.3–6)
HCT VFR BLD AUTO: 29.7 % (ref 35–47)
HGB BLD-MCNC: 9.6 G/DL (ref 11.7–15.7)
LYMPHOCYTES # BLD AUTO: 0.9 10E9/L (ref 0.8–5.3)
LYMPHOCYTES NFR BLD AUTO: 18.1 %
MCH RBC QN AUTO: 32.8 PG (ref 26.5–33)
MCHC RBC AUTO-ENTMCNC: 32.3 G/DL (ref 31.5–36.5)
MCV RBC AUTO: 101 FL (ref 78–100)
MONOCYTES # BLD AUTO: 0.3 10E9/L (ref 0–1.3)
MONOCYTES NFR BLD AUTO: 5.1 %
NEUTROPHILS # BLD AUTO: 3.7 10E9/L (ref 1.6–8.3)
NEUTROPHILS NFR BLD AUTO: 72.3 %
PLATELET # BLD AUTO: 315 10E9/L (ref 150–450)
POTASSIUM SERPL-SCNC: 3.9 MMOL/L (ref 3.4–5.3)
PROT SERPL-MCNC: 6.9 G/DL (ref 6.8–8.8)
RBC # BLD AUTO: 2.93 10E12/L (ref 3.8–5.2)
SODIUM SERPL-SCNC: 138 MMOL/L (ref 133–144)
TSH SERPL DL<=0.005 MIU/L-ACNC: 1.34 MU/L (ref 0.4–4)
WBC # BLD AUTO: 5.1 10E9/L (ref 4–11)

## 2017-01-24 PROCEDURE — 96413 CHEMO IV INFUSION 1 HR: CPT

## 2017-01-24 PROCEDURE — 85025 COMPLETE CBC W/AUTO DIFF WBC: CPT | Performed by: PHYSICIAN ASSISTANT

## 2017-01-24 PROCEDURE — 84443 ASSAY THYROID STIM HORMONE: CPT | Performed by: PHYSICIAN ASSISTANT

## 2017-01-24 PROCEDURE — 25000125 ZZHC RX 250: Performed by: INTERNAL MEDICINE

## 2017-01-24 PROCEDURE — 25000128 H RX IP 250 OP 636: Performed by: INTERNAL MEDICINE

## 2017-01-24 PROCEDURE — 83036 HEMOGLOBIN GLYCOSYLATED A1C: CPT | Performed by: PHYSICIAN ASSISTANT

## 2017-01-24 PROCEDURE — 93000 ELECTROCARDIOGRAM COMPLETE: CPT | Performed by: PHYSICIAN ASSISTANT

## 2017-01-24 PROCEDURE — 80053 COMPREHEN METABOLIC PANEL: CPT | Performed by: PHYSICIAN ASSISTANT

## 2017-01-24 PROCEDURE — 36415 COLL VENOUS BLD VENIPUNCTURE: CPT | Performed by: PHYSICIAN ASSISTANT

## 2017-01-24 PROCEDURE — 99214 OFFICE O/P EST MOD 30 MIN: CPT | Performed by: PHYSICIAN ASSISTANT

## 2017-01-24 RX ORDER — HEPARIN SODIUM (PORCINE) LOCK FLUSH IV SOLN 100 UNIT/ML 100 UNIT/ML
500 SOLUTION INTRAVENOUS EVERY 8 HOURS
Status: DISCONTINUED | OUTPATIENT
Start: 2017-01-24 | End: 2017-01-24 | Stop reason: HOSPADM

## 2017-01-24 RX ADMIN — SODIUM CHLORIDE, PRESERVATIVE FREE 500 UNITS: 5 INJECTION INTRAVENOUS at 12:49

## 2017-01-24 RX ADMIN — Medication 126 MG: at 11:59

## 2017-01-24 ASSESSMENT — PAIN SCALES - GENERAL
PAINLEVEL: NO PAIN (0)
PAINLEVEL: NO PAIN (0)

## 2017-01-24 NOTE — PATIENT INSTRUCTIONS
Pt to return on 2/14/17 for Herceptin. Copies of medication list and upcoming appointments given prior to discharge.

## 2017-01-24 NOTE — MR AVS SNAPSHOT
After Visit Summary   1/24/2017    Michaela Dorman    MRN: [id number]           Patient Information     Date Of Birth          1950        Visit Information        Provider Department      1/24/2017 10:00 AM Phani Tapia PA-C [facility]        Today's Diagnoses     Pre-operative cardiovascular examination    -  1     Malignant neoplasm of upper-outer quadrant of left female breast (H)         Chronic fatigue         Hyperglycemia         Preop general physical exam           Care Instructions      Before Your Surgery      Call your surgeon if there is any change in your health. This includes signs of a cold or flu (such as a sore throat, runny nose, cough, rash or fever).    Do not smoke, drink alcohol or take over the counter medicine (unless your surgeon or primary care doctor tells you to) for the 24 hours before and after surgery.    If you take prescribed drugs: Follow your doctor s orders about which medicines to take and which to stop until after surgery.    Eating and drinking prior to surgery: follow the instructions from your surgeon    Take a shower or bath the night before surgery. Use the soap your surgeon gave you to gently clean your skin. If you do not have soap from your surgeon, use your regular soap. Do not shave or scrub the surgery site.  Wear clean pajamas and have clean sheets on your bed.         Follow-ups after your visit        Your next 10 appointments already scheduled     Jan 24, 2017 11:30 AM   Level 2 with NL INFUSION CHAIR 3   [facility] ([facility])    [address] [place] [address]   [phone]            Feb 08, 2017   Procedure with Keyon Blanco MD   [facility] ([facility])    [address] [place] [address]   [phone]           From [address]: Exit at [address] on south side of [place]. Turn right on [address]. Turn  left at stoplight on [address]. [facility] will be in view two blocks ahead            Feb 14, 2017  9:15 AM   Return Visit with Loren Felipe MD   [facility] ([facility]    [address] [place] [id number] [phone]            Feb 14, 2017  1:00 PM   Level 2 with NL INFUSION CHAIR 1   [facility] ([facility])    [address] [place] [address]  [place] [id number] [phone]            Feb 16, 2017 10:00 AM   Return Visit with Keyon Blanco MD   [facility]    [address] [id number] [phone]            Feb 28, 2017  1:30 PM   [facility] Visit with [facility]eda [facility] MD   [facility]    [address] [id number] [phone]              Who to contact     If you have questions or need follow up information about today's clinic visit or your schedule please contact [facility] directly at [phone].  Normal or non-critical lab and imaging results will be communicated to you by MyChart, letter or phone within 4 business days after the clinic has received the results. If you do not hear from us within 7 days, please contact the clinic through MyChart or phone. If you have a critical or abnormal lab result, we will notify you by phone as soon as possible.  Submit refill requests through [employer] or call your pharmacy and they will forward the refill request to us. Please allow 3 business days for your refill to be completed.          Additional Information About Your Visit        [employer] Information     [employer] gives you secure access to your electronic health record. If you see a primary care provider, you can also send messages to your care team and make appointments. If you have questions, please call your primary care clinic.  If you do not have a primary care  provider, please call [phone] and they will assist you.        Care EveryWhere ID     This is your Care EveryWhere ID. This could be used by other organizations to access your [place] medical records  [id number]        Your Vitals Were     Pulse Temperature Respirations Height BMI (Body Mass Index)       76 96.8  F (36  C) (Oral) 20 5' (1.524 m) 27.46 kg/m2        Blood Pressure from Last 3 Encounters:   01/24/17 136/64   01/17/17 140/78   01/17/17 120/68    Weight from Last 3 Encounters:   01/24/17 140 lb 9.6 oz (63.776 kg)   01/17/17 138 lb 1.6 oz (62.642 kg)   01/03/17 138 lb 12.8 oz (62.959 kg)              We Performed the Following     CBC with platelets and differential     Comprehensive metabolic panel (BMP + Alb, Alk Phos, ALT, AST, Total. Bili, TP)     EKG 12-lead complete w/read - Clinics     Hemoglobin A1c     TSH with free T4 reflex        Primary Care Provider Office Phone # Fax #    Phani Tapia PA-C [phone]       [facility] [address] [facility] [address]        Thank you!     Thank you for choosing [facility]  for your care. Our goal is always to provide you with excellent care. Hearing back from our patients is one way we can continue to improve our services. Please take a few minutes to complete the written survey that you may receive in the mail after your visit with us. Thank you!             Your Updated Medication List - Protect others around you: Learn how to safely use, store and throw away your medicines at www.disposemymeds.org.          This list is accurate as of: 1/24/17 10:16 AM.  Always use your most recent med list.                   Brand Name Dispense Instructions for use    dexamethasone 4 MG tablet    DECADRON    30 tablet    Take 20mg the night before and the morning of chemotherapy as discussed       FIRST-MOUTHWASH BLM Susp     237 mL    Swish and swallow 5-10 mLs in mouth every 6 hours as needed       FLUoxetine 20 MG  capsule    PROzac    270 capsule    TAKE THREE CAPSULES BY MOUTH EVERY DAY TITRATE AS DIRECTED       hydrochlorothiazide 25 MG tablet    HYDRODIURIL    90 tablet    TAKE ONE TABLET BY MOUTH ONCE DAILY       * HYDROcodone-acetaminophen 5-325 MG per tablet    NORCO    30 tablet    Take 1-2 tablets by mouth every 4 hours as needed for other (Moderate to Severe Pain)       * HYDROcodone-acetaminophen 5-325 MG per tablet    NORCO    18 tablet    Take 1 tablet by mouth every 4 hours as needed for pain Maximum 8 tablet(s) per day       LORazepam 0.5 MG tablet    ATIVAN    30 tablet    Take 1 tablet (0.5 mg) by mouth every 4 hours as needed (Anxiety, Nausea/Vomiting or Sleep)       priLOSEC 20 MG CR capsule   Generic drug:  omeprazole      Take by mouth daily       prochlorperazine 10 MG tablet    COMPAZINE    30 tablet    Take 1 tablet (10 mg) by mouth every 6 hours as needed (Nausea/Vomiting)       psyllium 0.52 G capsule      Take 5 capsules by mouth daily       simvastatin 20 MG tablet    ZOCOR    90 tablet    Take 1 tablet (20 mg) by mouth At Bedtime       TYLENOL EX ST ARTHRITIS PAIN 500 MG tablet   Generic drug:  acetaminophen      Take 500-1,000 mg by mouth every 6 hours as needed for mild pain       venlafaxine 37.5 MG 24 hr capsule    EFFEXOR XR    30 capsule    Take 1 capsule (37.5 mg) by mouth daily       * Notice:  This list has 2 medication(s) that are the same as other medications prescribed for you. Read the directions carefully, and ask your doctor or other care provider to review them with you.

## 2017-01-24 NOTE — PROGRESS NOTES
Patient here for Herceptin chemotherapy today. Labs/BSA/Dose verified by 2 RN'S. No  premeds given and tolerated chemotherapy well.  Positive blood return pre/post infusion. Discharged in stable condition.

## 2017-01-24 NOTE — NURSING NOTE
Chief Complaint   Patient presents with     Pre-Op Exam       Initial /64 mmHg  Pulse 76  Temp(Src) 96.8  F (36  C) (Oral)  Resp 20  Ht 5' (1.524 m)  Wt 140 lb 9.6 oz (63.776 kg)  BMI 27.46 kg/m2 Estimated body mass index is 27.46 kg/(m^2) as calculated from the following:    Height as of this encounter: 5' (1.524 m).    Weight as of this encounter: 140 lb 9.6 oz (63.776 kg).  BP completed using cuff size: mone MEANS LPN

## 2017-01-24 NOTE — PROGRESS NOTES
[facility]  [address] [facility] [address]  [phone]  Dept: [phone]    PRE-OP EVALUATION:  Today's date: 2017    Michaela Dorman (: 1950) presents for pre-operative evaluation assessment as requested by Dr. Blanco.  She requires evaluation and anesthesia risk assessment prior to undergoing surgery/procedure for treatment of breast cancer .  Proposed procedure:   MASTECTOMY SIMPLE BILATERAL Bilateral General   Bilateral breast mastectomy                 Date of Surgery/ Procedure: 2017  Time of Surgery/ Procedure: 7:00 a.m  Hospital/Surgical Facility: [place] [address]  Tel. [phone] / Fax [phone]    Primary Physician: Phani Tapia  Type of Anesthesia Anticipated: General    Patient has a Health Care Directive or Living Will:  YES Health care directive    1. NO - Do you have a history of heart attack, stroke, stent, bypass or surgery on an artery in the head, neck, heart or legs?  2. NO - Do you ever have any pain or discomfort in your chest?  3. NO - Do you have a history of  Heart Failure?  4. NO - Are you troubled by shortness of breath when: walking on the level, up a slight hill or at night?  5. NO - Do you currently have a cold, bronchitis or other respiratory infection?  6. NO - Do you have a cough, shortness of breath or wheezing?  7. NO - Do you sometimes get pains in the calves of your legs when you walk?  8. NO - Do you or anyone in your family have previous history of blood clots?  9. NO - Do you or does anyone in your family have a serious bleeding problem such as prolonged bleeding following surgeries or cuts?  10. NO - Have you ever had problems with anemia or been told to take iron pills?  11. NO - Have you had any abnormal blood loss such as black, tarry or bloody stools, or abnormal vaginal bleeding?  12. NO - Have you ever had a blood  transfusion?  13. NO - Have you or any of your relatives ever had problems with anesthesia?  14. NO - Do you have sleep apnea, excessive snoring or daytime drowsiness?  15. NO - Do you have any prosthetic heart valves?  16. YES - DO YOU HAVE PROSTHETIC JOINTS?   17. NO - Is there any chance that you may be pregnant?      HPI:                                                      Brief HPI related to upcoming procedure: Breast Cancer.      See problem list for active medical problems.  Problems all longstanding and stable, except as noted/documented.  See ROS for pertinent symptoms related to these conditions.                                                                                                  .    MEDICAL HISTORY:                                                      Patient Active Problem List    Diagnosis Date Noted     Encounter for antineoplastic chemotherapy 10/07/2016     Priority: Medium     Malignant neoplasm of upper-outer quadrant of left female breast (H) 10/06/2016     Priority: Medium     Adjustment disorder with depressed mood 11/11/2015     Priority: Medium     Essential hypertension 11/11/2015     Priority: Medium     Baker's cyst of knee 02/09/2015     Priority: Medium     Patella-femoral syndrome 02/09/2015     Priority: Medium     Health Care Home 09/29/2011     Priority: Medium     x  DX V65.8 REPLACED WITH 25098 HEALTH CARE HOME (04/08/2013)       Advanced directives, counseling/discussion 08/22/2011     Priority: Medium     Advance Directive Problem List Overview:   Name Relationship Phone    Primary Health Care Agent            Alternative Health Care Agent          Patient states has Advance Directive and will bring in a copy to clinic. 8/22/2011          Trochanteric bursitis 01/06/2009     Priority: Medium     Family history of stroke (cerebrovascular) 03/07/2008     Priority: Medium     Enthesopathy of hip region 01/30/2006     Priority: Medium      Past Medical History    Diagnosis Date     Depressive disorder, not elsewhere classified      Esophageal reflux      Subdural hematoma (H)      ETOH abuse      in remission     Past Surgical History   Procedure Laterality Date     C nonspecific procedure  1956     ankle fracture surgery     C ligate fallopian tube       Hc remv cataract extracap,insert lens  6/25/2009     Right eye     Arthroscopy knee  6/7/2012     Procedure:ARTHROSCOPY KNEE; right knee arthroscopy with partial lateral menisectomy; Surgeon:DAMIÁN MCALLISTER; Location:[facility] OR     Open reduction internal fixation foot Right 3/20/2015     Procedure: OPEN REDUCTION INTERNAL FIXATION FOOT;  Surgeon: Javi Herrmann DPM;  Location: [facility] OR     Shoulder surgery Right 11/2015     Excise mass axilla Left 9/16/2016     Procedure: EXCISE MASS AXILLA;  Surgeon: Keyon Blanco MD;  Location: [facility] OR     Insert port vascular access Right 10/7/2016     Procedure: INSERT PORT VASCULAR ACCESS;  Surgeon: Keyon Blanco MD;  Location: [facility] OR     Current Outpatient Prescriptions   Medication Sig Dispense Refill     dexamethasone (DECADRON) 4 MG tablet Take 20mg the night before and the morning of chemotherapy as discussed 30 tablet 1     venlafaxine (EFFEXOR XR) 37.5 MG 24 hr capsule Take 1 capsule (37.5 mg) by mouth daily 30 capsule 1     prochlorperazine (COMPAZINE) 10 MG tablet Take 1 tablet (10 mg) by mouth every 6 hours as needed (Nausea/Vomiting) 30 tablet 3     HYDROcodone-acetaminophen (NORCO) 5-325 MG per tablet Take 1 tablet by mouth every 4 hours as needed for pain Maximum 8 tablet(s) per day 18 tablet 0     LORazepam (ATIVAN) 0.5 MG tablet Take 1 tablet (0.5 mg) by mouth every 4 hours as needed (Anxiety, Nausea/Vomiting or Sleep) 30 tablet 3     hydrochlorothiazide (HYDRODIURIL) 25 MG tablet TAKE ONE TABLET BY MOUTH ONCE DAILY 90 tablet 1     DPH-Lido-AlHydr-MgHydr-Simeth (FIRST-MOUTHWASH BLM) SUSP Swish and swallow 5-10 mLs in mouth every 6 hours as needed 237 mL 1      HYDROcodone-acetaminophen (NORCO) 5-325 MG per tablet Take 1-2 tablets by mouth every 4 hours as needed for other (Moderate to Severe Pain) 30 tablet 0     psyllium 0.52 G capsule Take 5 capsules by mouth daily       simvastatin (ZOCOR) 20 MG tablet Take 1 tablet (20 mg) by mouth At Bedtime 90 tablet 1     FLUoxetine (PROZAC) 20 MG capsule TAKE THREE CAPSULES BY MOUTH EVERY DAY TITRATE AS DIRECTED 270 capsule 0     acetaminophen (TYLENOL EX ST ARTHRITIS PAIN) 500 MG tablet Take 500-1,000 mg by mouth every 6 hours as needed for mild pain       omeprazole (PRILOSEC) 20 MG capsule Take by mouth daily       OTC products: no recent use of OTC ASA, NSAIDS or Steroids    Allergies   Allergen Reactions     No Known Drug Allergies       Latex Allergy: NO    Social History   Substance Use Topics     Smoking status: Former Smoker -- 3.00 packs/day for 10 years     Types: Cigarettes     Quit date: 12/01/1979     Smokeless tobacco: Never Used      Comment: approx. 3 packs daily     Alcohol Use: 0.0 oz/week     0 Standard drinks or equivalent per week      Comment: daily glass of wine     History   Drug Use No       REVIEW OF SYSTEMS:                                                    C: NEGATIVE for fever, chills, change in weight  I: NEGATIVE for worrisome rashes, moles or lesions  E: NEGATIVE for vision changes or irritation  E/M: NEGATIVE for ear, mouth and throat problems  R: NEGATIVE for significant cough or SOB  B: NEGATIVE for masses, tenderness or discharge  CV: NEGATIVE for chest pain, palpitations or peripheral edema  GI: NEGATIVE for nausea, abdominal pain, heartburn, or change in bowel habits  : NEGATIVE for frequency, dysuria, or hematuria  M: NEGATIVE for significant arthralgias or myalgia  N: NEGATIVE for weakness, dizziness or paresthesias  E: NEGATIVE for temperature intolerance, skin/hair changes  H: NEGATIVE for bleeding problems  P: NEGATIVE for changes in mood or affect    EXAM:                                                     /64 mmHg  Pulse 76  Temp(Src) 96.8  F (36  C) (Oral)  Resp 20  Ht 5' (1.524 m)  Wt 140 lb 9.6 oz (63.776 kg)  BMI 27.46 kg/m2    GENERAL APPEARANCE: healthy, alert and no distress     EYES: EOMI,- PERRL     HENT: ear canals and TM's normal and nose and mouth without ulcers or lesions     NECK: no adenopathy, no asymmetry, masses, or scars and thyroid normal to palpation     RESP: lungs clear to auscultation - no rales, rhonchi or wheezes     CV: regular rates and rhythm, normal S1 S2, no S3 or S4 and no murmur, click or rub -     ABDOMEN:  soft, nontender, no HSM or masses and bowel sounds normal     MS: extremities normal- no gross deformities noted, no evidence of inflammation in joints, FROM in all extremities.     SKIN: no suspicious lesions or rashes     NEURO: Normal strength and tone, sensory exam grossly normal, mentation intact and speech normal     PSYCH: mentation appears normal. and affect normal/bright     LYMPHATICS: No axillary, cervical, inguinal, or supraclavicular nodes    DIAGNOSTICS:                                                      EKG: appears normal, NSR, normal axis, normal intervals, no acute ST/T changes c/w ischemia, no LVH by voltage criteria, unchanged from previous tracings  Labs Resulted Today:   Results for orders placed or performed in visit on 01/17/17   CBC with platelets differential   Result Value Ref Range    WBC 15.7 (H) 4.0 - 11.0 10e9/L    RBC Count 3.33 (L) 3.8 - 5.2 10e12/L    Hemoglobin 10.8 (L) 11.7 - 15.7 g/dL    Hematocrit 33.3 (L) 35.0 - 47.0 %     78 - 100 fl    MCH 32.4 26.5 - 33.0 pg    MCHC 32.4 31.5 - 36.5 g/dL    RDW 15.4 (H) 10.0 - 15.0 %    Platelet Count 328 150 - 450 10e9/L    Diff Method Automated Method     % Neutrophils 95.7 %    % Lymphocytes 3.1 %    % Monocytes 0.1 %    % Eosinophils 0.0 %    % Basophils 0.1 %    % Immature Granulocytes 1.0 %    Absolute Neutrophil 15.0 (H) 1.6 - 8.3 10e9/L    Absolute  Lymphocytes 0.5 (L) 0.8 - 5.3 10e9/L    Absolute Monocytes 0.0 0.0 - 1.3 10e9/L    Absolute Eosinophils 0.0 0.0 - 0.7 10e9/L    Absolute Basophils 0.0 0.0 - 0.2 10e9/L    Abs Immature Granulocytes 0.2 0 - 0.4 10e9/L     Labs Drawn and in Process:   Unresulted Labs Ordered in the Past 30 Days of this Admission     No orders found from 11/26/2016 to 1/25/2017.          Recent Labs   Lab Test  01/17/17   0828  01/03/17   1000   12/06/16   0805   HGB  10.8*  10.5*   < >  10.0*   PLT  328  343   < >  291   NA   --   139   --   141   POTASSIUM   --   3.9   --   3.8   CR   --   0.77   --   0.77    < > = values in this interval not displayed.        IMPRESSION:                                                    Reason for surgery/procedure: bilateral mastectomy  Diagnosis/reason for consult: breast cancer anesthesia / surgical clearance.    The proposed surgical procedure is considered LOW risk.    REVISED CARDIAC RISK INDEX  The patient has the following serious cardiovascular risks for perioperative complications such as (MI, PE, VFib and 3  AV Block):  No serious cardiac risks  INTERPRETATION: 1 risks: Class II (low risk - 0.9% complication rate)    The patient has the following additional risks for perioperative complications:  No identified additional risks  Score not calculated. Missing: Total Cholesterol, HDL      ICD-10-CM    1. Pre-operative cardiovascular examination Z01.810 EKG 12-lead complete w/read - Clinics     CBC with platelets and differential     Comprehensive metabolic panel (BMP + Alb, Alk Phos, ALT, AST, Total. Bili, TP)     TSH with free T4 reflex   2. Malignant neoplasm of upper-outer quadrant of left female breast (H) C50.412 EKG 12-lead complete w/read - Clinics     CBC with platelets and differential     Comprehensive metabolic panel (BMP + Alb, Alk Phos, ALT, AST, Total. Bili, TP)     TSH with free T4 reflex   3. Chronic fatigue R53.82 EKG 12-lead complete w/read - Clinics     CBC with platelets  and differential     Comprehensive metabolic panel (BMP + Alb, Alk Phos, ALT, AST, Total. Bili, TP)     TSH with free T4 reflex   4. Hyperglycemia R73.9 EKG 12-lead complete w/read - Clinics     CBC with platelets and differential     Comprehensive metabolic panel (BMP + Alb, Alk Phos, ALT, AST, Total. Bili, TP)     TSH with free T4 reflex       RECOMMENDATIONS:                                                      --Consult hospital rounder / IM to assist post-op medical management    --Patient is to take all scheduled medications on the day of surgery EXCEPT for modifications listed below.    APPROVAL GIVEN to proceed with proposed procedure, without further diagnostic evaluation     Signed Electronically by: Phani Jason PA-C    Copy of this evaluation report is provided to requesting physician.    [place] Preop Guidelines

## 2017-02-06 DIAGNOSIS — Z51.11 ENCOUNTER FOR ANTINEOPLASTIC CHEMOTHERAPY: ICD-10-CM

## 2017-02-06 DIAGNOSIS — C50.412 MALIGNANT NEOPLASM OF UPPER-OUTER QUADRANT OF LEFT FEMALE BREAST (H): Primary | ICD-10-CM

## 2017-02-06 NOTE — TELEPHONE ENCOUNTER
Prochlorperazine       Last Written Prescription Date: 01/03/2017  Last Fill Quantity: 30,  # refills: 3   Last Office Visit with [facility], [facility] or [facility] prescribing provider: 01/17/2017                                         Next 5 appointments (look out 90 days)     Feb 14, 2017  9:15 AM   [facility]    [address] [place] [id number] [phone]            Feb 16, 2017 10:00 AM   [facility] Visit with Keyon Blanco MD   [facility]    [id number] [place] [address] [phone]            Feb 28, 2017  1:30 PM   [facility] Visit with Syeda Ferguson MD   [facility]    [address] [facility] [address] [phone]                    Thanks  Luz [facility]   [phone]

## 2017-02-07 ENCOUNTER — ANESTHESIA EVENT (OUTPATIENT)
Dept: SURGERY | Facility: CLINIC | Age: 67
End: 2017-02-07
Payer: MEDICARE

## 2017-02-07 RX ORDER — PROCHLORPERAZINE MALEATE 10 MG
10 TABLET ORAL EVERY 6 HOURS PRN
Qty: 30 TABLET | Refills: 3 | Status: SHIPPED | OUTPATIENT
Start: 2017-02-07 | End: 2017-03-21

## 2017-02-08 ENCOUNTER — ANESTHESIA (OUTPATIENT)
Dept: SURGERY | Facility: CLINIC | Age: 67
End: 2017-02-08
Payer: MEDICARE

## 2017-02-08 PROBLEM — Z90.13 S/P BILATERAL MASTECTOMY: Status: ACTIVE | Noted: 2017-02-08

## 2017-02-08 PROCEDURE — 25000125 ZZHC RX 250: Performed by: NURSE ANESTHETIST, CERTIFIED REGISTERED

## 2017-02-08 PROCEDURE — 25000128 H RX IP 250 OP 636: Performed by: NURSE ANESTHETIST, CERTIFIED REGISTERED

## 2017-02-08 PROCEDURE — 25000128 H RX IP 250 OP 636: Performed by: SPECIALIST

## 2017-02-08 PROCEDURE — 25800025 ZZH RX 258: Performed by: NURSE ANESTHETIST, CERTIFIED REGISTERED

## 2017-02-08 RX ORDER — KETOROLAC TROMETHAMINE 30 MG/ML
INJECTION, SOLUTION INTRAMUSCULAR; INTRAVENOUS PRN
Status: DISCONTINUED | OUTPATIENT
Start: 2017-02-08 | End: 2017-02-08

## 2017-02-08 RX ORDER — PROPOFOL 10 MG/ML
INJECTION, EMULSION INTRAVENOUS PRN
Status: DISCONTINUED | OUTPATIENT
Start: 2017-02-08 | End: 2017-02-08

## 2017-02-08 RX ORDER — EPHEDRINE SULFATE 50 MG/ML
INJECTION, SOLUTION INTRAMUSCULAR; INTRAVENOUS; SUBCUTANEOUS PRN
Status: DISCONTINUED | OUTPATIENT
Start: 2017-02-08 | End: 2017-02-08

## 2017-02-08 RX ORDER — LIDOCAINE HYDROCHLORIDE 20 MG/ML
INJECTION, SOLUTION INFILTRATION; PERINEURAL PRN
Status: DISCONTINUED | OUTPATIENT
Start: 2017-02-08 | End: 2017-02-08

## 2017-02-08 RX ORDER — ONDANSETRON 2 MG/ML
INJECTION INTRAMUSCULAR; INTRAVENOUS PRN
Status: DISCONTINUED | OUTPATIENT
Start: 2017-02-08 | End: 2017-02-08

## 2017-02-08 RX ORDER — FENTANYL CITRATE 50 UG/ML
INJECTION, SOLUTION INTRAMUSCULAR; INTRAVENOUS PRN
Status: DISCONTINUED | OUTPATIENT
Start: 2017-02-08 | End: 2017-02-08

## 2017-02-08 RX ORDER — DEXAMETHASONE SODIUM PHOSPHATE 10 MG/ML
INJECTION, SOLUTION INTRAMUSCULAR; INTRAVENOUS PRN
Status: DISCONTINUED | OUTPATIENT
Start: 2017-02-08 | End: 2017-02-08

## 2017-02-08 RX ADMIN — Medication 5 MG: at 08:52

## 2017-02-08 RX ADMIN — KETOROLAC TROMETHAMINE 30 MG: 30 INJECTION, SOLUTION INTRAMUSCULAR at 09:50

## 2017-02-08 RX ADMIN — PHENYLEPHRINE HYDROCHLORIDE 100 MCG: 10 INJECTION, SOLUTION INTRAMUSCULAR; INTRAVENOUS; SUBCUTANEOUS at 09:07

## 2017-02-08 RX ADMIN — MIDAZOLAM HYDROCHLORIDE 2 MG: 1 INJECTION, SOLUTION INTRAMUSCULAR; INTRAVENOUS at 07:36

## 2017-02-08 RX ADMIN — FENTANYL CITRATE 50 MCG: 50 INJECTION, SOLUTION INTRAMUSCULAR; INTRAVENOUS at 08:52

## 2017-02-08 RX ADMIN — Medication 5 MG: at 08:01

## 2017-02-08 RX ADMIN — CEFAZOLIN SODIUM 2 G: 2 INJECTION, SOLUTION INTRAVENOUS at 07:45

## 2017-02-08 RX ADMIN — ONDANSETRON 4 MG: 2 INJECTION INTRAMUSCULAR; INTRAVENOUS at 07:50

## 2017-02-08 RX ADMIN — PHENYLEPHRINE HYDROCHLORIDE 100 MCG: 10 INJECTION, SOLUTION INTRAMUSCULAR; INTRAVENOUS; SUBCUTANEOUS at 08:55

## 2017-02-08 RX ADMIN — Medication 5 MG: at 08:04

## 2017-02-08 RX ADMIN — FENTANYL CITRATE 50 MCG: 50 INJECTION, SOLUTION INTRAMUSCULAR; INTRAVENOUS at 09:19

## 2017-02-08 RX ADMIN — Medication 5 MG: at 08:36

## 2017-02-08 RX ADMIN — SUCCINYLCHOLINE CHLORIDE 80 MG: 20 INJECTION, SOLUTION INTRAMUSCULAR; INTRAVENOUS at 07:44

## 2017-02-08 RX ADMIN — Medication 5 MG: at 09:28

## 2017-02-08 RX ADMIN — DEXAMETHASONE SODIUM PHOSPHATE 10 MG: 10 INJECTION, SOLUTION INTRAMUSCULAR; INTRAVENOUS at 07:50

## 2017-02-08 RX ADMIN — FENTANYL CITRATE 50 MCG: 50 INJECTION, SOLUTION INTRAMUSCULAR; INTRAVENOUS at 07:43

## 2017-02-08 RX ADMIN — FENTANYL CITRATE 50 MCG: 50 INJECTION, SOLUTION INTRAMUSCULAR; INTRAVENOUS at 07:53

## 2017-02-08 RX ADMIN — SODIUM CHLORIDE, POTASSIUM CHLORIDE, SODIUM LACTATE AND CALCIUM CHLORIDE: 600; 310; 30; 20 INJECTION, SOLUTION INTRAVENOUS at 08:27

## 2017-02-08 RX ADMIN — PROPOFOL 200 MG: 10 INJECTION, EMULSION INTRAVENOUS at 07:43

## 2017-02-08 RX ADMIN — CEFAZOLIN SODIUM 1 G: 2 INJECTION, SOLUTION INTRAVENOUS at 09:45

## 2017-02-08 RX ADMIN — LIDOCAINE HYDROCHLORIDE 40 MG: 20 INJECTION, SOLUTION INFILTRATION; PERINEURAL at 07:43

## 2017-02-08 RX ADMIN — FENTANYL CITRATE 50 MCG: 50 INJECTION, SOLUTION INTRAMUSCULAR; INTRAVENOUS at 09:11

## 2017-02-08 ASSESSMENT — LIFESTYLE VARIABLES: TOBACCO_USE: 1

## 2017-02-08 NOTE — ANESTHESIA PREPROCEDURE EVALUATION
Anesthesia Evaluation     . Pt has had prior anesthetic. Type: General and MAC      ROS/MED HX    ENT/Pulmonary:     (+)ANTON risk factors hypertension, tobacco use, Past use , . .    Neurologic:  - neg neurologic ROS     Cardiovascular:     (+) hypertension----. : . . . :. . Previous cardiac testing date:results:date: results:ECG reviewed date:1/24/17 results:SR within normal limits. date: results:          METS/Exercise Tolerance:     Hematologic:  - neg hematologic  ROS       Musculoskeletal:        Lower extremity injury: ORIF Foot 2015.   GI/Hepatic:     (+) GERD Asymptomatic on medication,       Renal/Genitourinary:  - ROS Renal section negative   (+) Pt has no history of transplant,       Endo:  - neg endo ROS       Psychiatric:     (+) psychiatric history depression      Infectious Disease:  - neg infectious disease ROS       Malignancy:   (+) Malignancy History of Breast  Breast CA Active status post Chemo.         Other:    - neg other ROS           Physical Exam  Normal systems: cardiovascular, pulmonary and dental    Airway   Mallampati: II  TM distance: >3 FB  Neck ROM: full    Dental     Cardiovascular   Rhythm and rate: regular and normal      Pulmonary    breath sounds clear to auscultation    Other findings: Pt denies chips/damage.  Gap? Right side, irregular wear surface/crooked dentition.                    Anesthesia Plan      History & Physical Review  History and physical reviewed and following examination; no interval change.    ASA Status:  2 .    NPO Status:  > 4 hours    Plan for General and LMA with Propofol and Intravenous induction. Maintenance will be Balanced.    PONV prophylaxis:  Ondansetron and Dexamethasone       Postoperative Care  Postoperative pain management:  IV analgesics and Oral pain medications.      Consents  Anesthetic plan, risks, benefits and alternatives discussed with:  Patient.  Use of blood products discussed: No .   .                          .

## 2017-02-08 NOTE — ANESTHESIA CARE TRANSFER NOTE
Patient: Michaela Dorman    Procedure(s):  Bilateral breast mastectomy - Wound Class: I-Clean    Diagnosis: left breast cancer  Diagnosis Additional Information: No value filed.    Anesthesia Type:   General, ETT     Note:  Airway :Face Mask  Patient transferred to:PACU        Vitals: (Last set prior to Anesthesia Care Transfer)    CRNA VITALS  2/8/2017 0929 - 2/8/2017 1004      2/8/2017             SpO2: 100 %                Electronically Signed By: LISA Ritchie CRNA  February 8, 2017  10:04 AM

## 2017-02-09 NOTE — ANESTHESIA POSTPROCEDURE EVALUATION
Patient: Michaela Dorman    Procedure(s):  Bilateral breast mastectomy - Wound Class: I-Clean    Diagnosis:left breast cancer  Diagnosis Additional Information: No value filed.    Anesthesia Type:  General, ETT    Note:  Anesthesia Post Evaluation    Patient location during evaluation: PACU  Patient participation: Able to fully participate in evaluation  Level of consciousness: awake  Pain management: adequate  Airway patency: patent  Cardiovascular status: acceptable and hemodynamically stable  Respiratory status: acceptable, room air and nonlabored ventilation  Hydration status: stable  PONV: none     Anesthetic complications: None (Called pt at home)    Comments: Pt discharged earlier this afternoon,  Called pt at home patient was happy with the anesthesia care received and no anesthesia related complications were stated.  I will follow up with the patient again if it is needed.        Last vitals:  Filed Vitals:    02/08/17 2150 02/08/17 2353 02/09/17 0801   BP: 153/85 138/80 140/80   Pulse: 98 90 88   Temp: 97.2  F (36.2  C) 96.7  F (35.9  C) 97  F (36.1  C)   Resp: 16 18 18   SpO2: 92% 95% 95%         Electronically Signed By: LISA Rust CRNA  February 9, 2017  3:58 PM

## 2017-02-10 ENCOUNTER — TELEPHONE (OUTPATIENT)
Dept: ONCOLOGY | Facility: CLINIC | Age: 67
End: 2017-02-10
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
## 2017-02-14 ENCOUNTER — INFUSION THERAPY VISIT (OUTPATIENT)
Dept: INFUSION THERAPY | Facility: CLINIC | Age: 67
End: 2017-02-14
Attending: INTERNAL MEDICINE
Payer: MEDICARE

## 2017-02-14 VITALS
DIASTOLIC BLOOD PRESSURE: 82 MMHG | WEIGHT: 140.5 LBS | BODY MASS INDEX: 22.68 KG/M2 | TEMPERATURE: 98 F | SYSTOLIC BLOOD PRESSURE: 146 MMHG | RESPIRATION RATE: 18 BRPM | HEART RATE: 77 BPM | OXYGEN SATURATION: 98 %

## 2017-02-14 DIAGNOSIS — G89.18 POST-OP PAIN: Primary | ICD-10-CM

## 2017-02-14 DIAGNOSIS — C50.412 MALIGNANT NEOPLASM OF UPPER-OUTER QUADRANT OF LEFT FEMALE BREAST (H): Primary | ICD-10-CM

## 2017-02-14 DIAGNOSIS — Z51.11 ENCOUNTER FOR ANTINEOPLASTIC CHEMOTHERAPY: ICD-10-CM

## 2017-02-14 LAB
BASOPHILS # BLD AUTO: 0 10E9/L (ref 0–0.2)
BASOPHILS NFR BLD AUTO: 0.5 %
DIFFERENTIAL METHOD BLD: ABNORMAL
EOSINOPHIL # BLD AUTO: 0.2 10E9/L (ref 0–0.7)
EOSINOPHIL NFR BLD AUTO: 2 %
ERYTHROCYTE [DISTWIDTH] IN BLOOD BY AUTOMATED COUNT: 12.8 % (ref 10–15)
HCT VFR BLD AUTO: 33.1 % (ref 35–47)
HGB BLD-MCNC: 10.6 G/DL (ref 11.7–15.7)
IMM GRANULOCYTES # BLD: 0 10E9/L (ref 0–0.4)
IMM GRANULOCYTES NFR BLD: 0.1 %
LYMPHOCYTES # BLD AUTO: 2.2 10E9/L (ref 0.8–5.3)
LYMPHOCYTES NFR BLD AUTO: 28.2 %
MCH RBC QN AUTO: 31.8 PG (ref 26.5–33)
MCHC RBC AUTO-ENTMCNC: 32 G/DL (ref 31.5–36.5)
MCV RBC AUTO: 99 FL (ref 78–100)
MONOCYTES # BLD AUTO: 0.6 10E9/L (ref 0–1.3)
MONOCYTES NFR BLD AUTO: 7.5 %
NEUTROPHILS # BLD AUTO: 4.8 10E9/L (ref 1.6–8.3)
NEUTROPHILS NFR BLD AUTO: 61.7 %
PLATELET # BLD AUTO: 284 10E9/L (ref 150–450)
RBC # BLD AUTO: 3.33 10E12/L (ref 3.8–5.2)
WBC # BLD AUTO: 7.9 10E9/L (ref 4–11)

## 2017-02-14 PROCEDURE — 25000128 H RX IP 250 OP 636: Performed by: INTERNAL MEDICINE

## 2017-02-14 PROCEDURE — 85025 COMPLETE CBC W/AUTO DIFF WBC: CPT | Performed by: INTERNAL MEDICINE

## 2017-02-14 PROCEDURE — 96413 CHEMO IV INFUSION 1 HR: CPT

## 2017-02-14 RX ORDER — DIPHENHYDRAMINE HYDROCHLORIDE 50 MG/ML
50 INJECTION INTRAMUSCULAR; INTRAVENOUS ONCE
Status: CANCELLED
Start: 2017-02-14

## 2017-02-14 RX ORDER — ACETAMINOPHEN 325 MG/1
650 TABLET ORAL ONCE
Status: CANCELLED
Start: 2017-02-14

## 2017-02-14 RX ORDER — ALBUTEROL SULFATE 0.83 MG/ML
2.5 SOLUTION RESPIRATORY (INHALATION)
Status: CANCELLED | OUTPATIENT
Start: 2017-02-14

## 2017-02-14 RX ORDER — HEPARIN SODIUM (PORCINE) LOCK FLUSH IV SOLN 100 UNIT/ML 100 UNIT/ML
500 SOLUTION INTRAVENOUS EVERY 8 HOURS
Status: DISCONTINUED | OUTPATIENT
Start: 2017-02-14 | End: 2017-02-14 | Stop reason: HOSPADM

## 2017-02-14 RX ORDER — SODIUM CHLORIDE 9 MG/ML
1000 INJECTION, SOLUTION INTRAVENOUS CONTINUOUS PRN
Status: CANCELLED
Start: 2017-02-14

## 2017-02-14 RX ORDER — MEPERIDINE HYDROCHLORIDE 25 MG/ML
25 INJECTION INTRAMUSCULAR; INTRAVENOUS; SUBCUTANEOUS EVERY 30 MIN PRN
Status: CANCELLED | OUTPATIENT
Start: 2017-02-14

## 2017-02-14 RX ORDER — LORAZEPAM 2 MG/ML
.5-1 INJECTION INTRAMUSCULAR EVERY 6 HOURS PRN
Status: CANCELLED | OUTPATIENT
Start: 2017-02-14

## 2017-02-14 RX ORDER — OXYCODONE AND ACETAMINOPHEN 5; 325 MG/1; MG/1
1 TABLET ORAL EVERY 4 HOURS PRN
Qty: 20 TABLET | Refills: 0 | Status: SHIPPED | OUTPATIENT
Start: 2017-02-14 | End: 2017-03-21

## 2017-02-14 RX ORDER — ALBUTEROL SULFATE 90 UG/1
1-2 AEROSOL, METERED RESPIRATORY (INHALATION)
Status: CANCELLED
Start: 2017-02-14

## 2017-02-14 RX ORDER — LORAZEPAM 0.5 MG/1
.5-1 TABLET ORAL EVERY 6 HOURS PRN
Status: CANCELLED
Start: 2017-02-14

## 2017-02-14 RX ORDER — HEPARIN SODIUM (PORCINE) LOCK FLUSH IV SOLN 100 UNIT/ML 100 UNIT/ML
500 SOLUTION INTRAVENOUS EVERY 8 HOURS
Status: CANCELLED
Start: 2017-02-14

## 2017-02-14 RX ORDER — DIPHENHYDRAMINE HYDROCHLORIDE 50 MG/ML
50 INJECTION INTRAMUSCULAR; INTRAVENOUS
Status: CANCELLED
Start: 2017-02-14

## 2017-02-14 RX ORDER — PROCHLORPERAZINE MALEATE 5 MG
10 TABLET ORAL EVERY 6 HOURS PRN
Status: CANCELLED
Start: 2017-02-14

## 2017-02-14 RX ORDER — METHYLPREDNISOLONE SODIUM SUCCINATE 125 MG/2ML
125 INJECTION, POWDER, LYOPHILIZED, FOR SOLUTION INTRAMUSCULAR; INTRAVENOUS
Status: CANCELLED
Start: 2017-02-14

## 2017-02-14 RX ADMIN — SODIUM CHLORIDE, PRESERVATIVE FREE 500 UNITS: 5 INJECTION INTRAVENOUS at 14:56

## 2017-02-14 RX ADMIN — TRASTUZUMAB 382 MG: KIT at 14:06

## 2017-02-14 ASSESSMENT — PAIN SCALES - GENERAL: PAINLEVEL: NO PAIN (0)

## 2017-02-14 NOTE — MR AVS SNAPSHOT
After Visit Summary   2/14/2017    Michaela Dorman    MRN: [id number]           Patient Information     Date Of Birth          1950        Visit Information        Provider Department      2/14/2017 1:00 PM NL INFUSION CHAIR 1 [facility]        Today's Diagnoses     Malignant neoplasm of upper-outer quadrant of left female breast (H)    -  1    Encounter for antineoplastic chemotherapy          Care Instructions    Pt to return on 3/7/17 for Herceptin. Copies of medication list and upcoming appointments given prior to discharge.           Follow-ups after your visit        Your next 10 appointments already scheduled     Feb 16, 2017  8:15 AM CST   [facility] Visit with Keyon [facility]anco MD   [facility]    [address] [address] [phone]            Feb 28, 2017  1:30 PM CST   Return Visit with Syeda Ferguson MD   [facility] ([facility]    [address] [place] [address] [id number] [phone]            Mar 07, 2017 11:30 AM CST   Level 2 with NL INFUSION CHAIR 3   [facility] ([facility])    [address] [place] [id number] [phone]              Who to contact     If you have questions or need follow up information about today's clinic visit or your schedule please contact [facility] directly at [phone].  Normal or non-critical lab and imaging results will be communicated to you by MyChart, letter or phone within 4 business days after the clinic has received the results. If you do not hear from us within 7 days, please contact the clinic through MyChart or phone. If you have a critical or abnormal lab result, we will notify you by phone as soon as possible.  Submit refill requests through [employer] or call your pharmacy and they will forward the refill request to us. Please  allow 3 business days for your refill to be completed.          Additional Information About Your Visit        [email or web address] Information     [employer] gives you secure access to your electronic health record. If you see a primary care provider, you can also send messages to your care team and make appointments. If you have questions, please call your primary care clinic.  If you do not have a primary care provider, please call [phone] and they will assist you.        Care EveryWhere ID     This is your Care EveryWhere ID. This could be used by other organizations to access your [facility] medical records  [id number]        Your Vitals Were     Pulse Temperature Respirations Pulse Oximetry BMI (Body Mass Index)       77 98  F (36.7  C) (Temporal) 18 98% 22.68 kg/m2        Blood Pressure from Last 3 Encounters:   02/14/17 146/82   02/09/17 140/80   01/24/17 118/63    Weight from Last 3 Encounters:   02/14/17 63.7 kg (140 lb 8 oz)   02/08/17 64.5 kg (142 lb 3.2 oz)   01/24/17 63.6 kg (140 lb 4.8 oz)              We Performed the Following     CBC with platelets differential          Today's Medication Changes          These changes are accurate as of: 2/14/17  4:00 PM.  If you have any questions, ask your nurse or doctor.               These medicines have changed or have updated prescriptions.        Dose/Directions    * oxyCODONE-acetaminophen 5-325 MG per tablet   Commonly known as:  PERCOCET   This may have changed:  Another medication with the same name was added. Make sure you understand how and when to take each.   Used for:  Post-op pain        Dose:  1-2 tablet   Take 1-2 tablets by mouth every 4 hours as needed for pain (moderate to severe)   Quantity:  30 tablet   Refills:  0       * oxyCODONE-acetaminophen 5-325 MG per tablet   Commonly known as:  PERCOCET   This may have changed:  You were already taking a medication with the same name, and this prescription was added. Make sure you understand how and when to  take each.   Used for:  Post-op pain   Changed by:  Keyon Blanco MD        Dose:  1 tablet   Take 1 tablet by mouth every 4 hours as needed for pain   Quantity:  20 tablet   Refills:  0       * Notice:  This list has 2 medication(s) that are the same as other medications prescribed for you. Read the directions carefully, and ask your doctor or other care provider to review them with you.         Where to get your medicines      Some of these will need a paper prescription and others can be bought over the counter.  Ask your nurse if you have questions.     Bring a paper prescription for each of these medications     oxyCODONE-acetaminophen 5-325 MG per tablet                Primary Care Provider Office Phone # Fax #    Phani Tapia PA-C [phone]       [place] [address] [place] [id number]        Thank you!     Thank you for choosing [facility]  for your care. Our goal is always to provide you with excellent care. Hearing back from our patients is one way we can continue to improve our services. Please take a few minutes to complete the written survey that you may receive in the mail after your visit with us. Thank you!             Your Updated Medication List - Protect others around you: Learn how to safely use, store and throw away your medicines at www.disposemymeds.org.          This list is accurate as of: 2/14/17  4:00 PM.  Always use your most recent med list.                   Brand Name Dispense Instructions for use    FLUoxetine 20 MG capsule    PROzac    270 capsule    TAKE THREE CAPSULES BY MOUTH EVERY DAY TITRATE AS DIRECTED       hydrochlorothiazide 25 MG tablet    HYDRODIURIL    90 tablet    TAKE ONE TABLET BY MOUTH ONCE DAILY       HYDROcodone-acetaminophen 5-325 MG per tablet    NORCO    18 tablet    Take 1 tablet by mouth every 4 hours as needed for pain Maximum 8 tablet(s) per day       LORazepam 0.5 MG tablet    ATIVAN    30  tablet    Take 1 tablet (0.5 mg) by mouth every 4 hours as needed (Anxiety, Nausea/Vomiting or Sleep)       * oxyCODONE-acetaminophen 5-325 MG per tablet    PERCOCET    30 tablet    Take 1-2 tablets by mouth every 4 hours as needed for pain (moderate to severe)       * oxyCODONE-acetaminophen 5-325 MG per tablet    PERCOCET    20 tablet    Take 1 tablet by mouth every 4 hours as needed for pain       priLOSEC 20 MG CR capsule   Generic drug:  omeprazole      Take by mouth daily       prochlorperazine 10 MG tablet    COMPAZINE    30 tablet    Take 1 tablet (10 mg) by mouth every 6 hours as needed (Nausea/Vomiting)       simvastatin 20 MG tablet    ZOCOR    90 tablet    Take 1 tablet (20 mg) by mouth At Bedtime       TYLENOL EX ST ARTHRITIS PAIN 500 MG tablet   Generic drug:  acetaminophen      Take 500-1,000 mg by mouth every 6 hours as needed for mild pain       venlafaxine 37.5 MG 24 hr capsule    EFFEXOR XR    30 capsule    Take 1 capsule (37.5 mg) by mouth daily       * Notice:  This list has 2 medication(s) that are the same as other medications prescribed for you. Read the directions carefully, and ask your doctor or other care provider to review them with you.

## 2017-02-14 NOTE — PROGRESS NOTES
Patient here for Herceptin  chemotherapy today. Labs/BSA/Dose verified by 2 RN'S.   Tolerated chemotherapy well.  Positive blood return pre/post infusion. Discharged in stable condition.
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
## 2017-02-14 NOTE — PROGRESS NOTES
Received call for clarification that Herceptin is to be increased to 6 mg/kg from 2 mg/kg.  Reviewed with Dr. Ferguson.  Dose increase due to changing from every week to every 3 weeks.    Juan José Hernandez RN, BSN, OCN  2/14/2017, 1:35 PM

## 2017-02-14 NOTE — PATIENT INSTRUCTIONS
Pt to return on 3/7/17 for Herceptin. Copies of medication list and upcoming appointments given prior to discharge.

## 2017-02-16 ENCOUNTER — OFFICE VISIT (OUTPATIENT)
Dept: SURGERY | Facility: OTHER | Age: 67
End: 2017-02-16
Payer: COMMERCIAL

## 2017-02-16 VITALS — HEART RATE: 89 BPM | WEIGHT: 141.8 LBS | TEMPERATURE: 98.3 F | OXYGEN SATURATION: 97 % | BODY MASS INDEX: 22.89 KG/M2

## 2017-02-16 DIAGNOSIS — Z98.890 POST-OPERATIVE STATE: Primary | ICD-10-CM

## 2017-02-16 PROCEDURE — 99024 POSTOP FOLLOW-UP VISIT: CPT | Performed by: SPECIALIST

## 2017-02-16 NOTE — MR AVS SNAPSHOT
After Visit Summary   2/16/2017    Michaela Dorman    MRN: [id number]           Patient Information     Date Of Birth          1950        Visit Information        Provider Department      2/16/2017 8:15 AM Keyon Blanco MD [facility]        Today's Diagnoses     Post-operative state    -  1       Follow-ups after your visit        Your next 10 appointments already scheduled     Feb 28, 2017  1:30 PM CST   Return Visit with Syeda Ferguson MD   [facility]    [address] [place] [id number] [phone]            Mar 07, 2017 11:30 AM CST   Level 2 with NL INFUSION CHAIR 3   [facility] ([facility])    [address] [place] [id number] [phone]              Who to contact     If you have questions or need follow up information about today's clinic visit or your schedule please contact [facility] directly at [phone].  Normal or non-critical lab and imaging results will be communicated to you by [employer], letter or phone within 4 business days after the clinic has received the results. If you do not hear from us within 7 days, please contact the clinic through [employer] or phone. If you have a critical or abnormal lab result, we will notify you by phone as soon as possible.  Submit refill requests through [employer] or call your pharmacy and they will forward the refill request to us. Please allow 3 business days for your refill to be completed.          Additional Information About Your Visit        [employer] Information     [employer] gives you secure access to your electronic health record. If you see a primary care provider, you can also send messages to your care team and make appointments. If you have questions, please call your primary care clinic.  If you do not have a primary care provider, please call [phone] and they will assist you.         Care EveryWhere ID     This is your Care EveryWhere ID. This could be used by other organizations to access your [facility] medical records  [id number]        Your Vitals Were     Pulse Temperature Pulse Oximetry BMI (Body Mass Index)          89 98.3  F (36.8  C) (Temporal) 97% 22.89 kg/m2         Blood Pressure from Last 3 Encounters:   02/14/17 146/82   02/09/17 140/80   01/24/17 118/63    Weight from Last 3 Encounters:   02/16/17 141 lb 12.8 oz (64.3 kg)   02/14/17 140 lb 8 oz (63.7 kg)   02/08/17 142 lb 3.2 oz (64.5 kg)              Today, you had the following     No orders found for display       Primary Care Provider Office Phone # Fax #    Phani Tapia PA-C [phone]       [facility] [address] [place] [address]        Thank you!     Thank you for choosing [facility]  for your care. Our goal is always to provide you with excellent care. Hearing back from our patients is one way we can continue to improve our services. Please take a few minutes to complete the written survey that you may receive in the mail after your visit with us. Thank you!             Your Updated Medication List - Protect others around you: Learn how to safely use, store and throw away your medicines at www.disposemymeds.org.          This list is accurate as of: 2/16/17  8:18 AM.  Always use your most recent med list.                   Brand Name Dispense Instructions for use    FLUoxetine 20 MG capsule    PROzac    270 capsule    TAKE THREE CAPSULES BY MOUTH EVERY DAY TITRATE AS DIRECTED       hydrochlorothiazide 25 MG tablet    HYDRODIURIL    90 tablet    TAKE ONE TABLET BY MOUTH ONCE DAILY       HYDROcodone-acetaminophen 5-325 MG per tablet    NORCO    18 tablet    Take 1 tablet by mouth every 4 hours as needed for pain Maximum 8 tablet(s) per day       LORazepam 0.5 MG tablet    ATIVAN    30 tablet    Take 1 tablet (0.5 mg) by mouth every 4 hours as needed (Anxiety,  Nausea/Vomiting or Sleep)       * oxyCODONE-acetaminophen 5-325 MG per tablet    PERCOCET    30 tablet    Take 1-2 tablets by mouth every 4 hours as needed for pain (moderate to severe)       * oxyCODONE-acetaminophen 5-325 MG per tablet    PERCOCET    20 tablet    Take 1 tablet by mouth every 4 hours as needed for pain       priLOSEC 20 MG CR capsule   Generic drug:  omeprazole      Take by mouth daily       prochlorperazine 10 MG tablet    COMPAZINE    30 tablet    Take 1 tablet (10 mg) by mouth every 6 hours as needed (Nausea/Vomiting)       simvastatin 20 MG tablet    ZOCOR    90 tablet    Take 1 tablet (20 mg) by mouth At Bedtime       TYLENOL EX ST ARTHRITIS PAIN 500 MG tablet   Generic drug:  acetaminophen      Take 500-1,000 mg by mouth every 6 hours as needed for mild pain       venlafaxine 37.5 MG 24 hr capsule    EFFEXOR XR    30 capsule    Take 1 capsule (37.5 mg) by mouth daily       * Notice:  This list has 2 medication(s) that are the same as other medications prescribed for you. Read the directions carefully, and ask your doctor or other care provider to review them with you.

## 2017-02-16 NOTE — NURSING NOTE
"Chief Complaint   Patient presents with     Surgical Followup     MASTECTOMY SIMPLE BILATERAL DOS 2-8-2016       Initial Pulse 89  Temp 98.3  F (36.8  C) (Temporal)  Wt 141 lb 12.8 oz (64.3 kg)  SpO2 97%  BMI 22.89 kg/m2 Estimated body mass index is 22.89 kg/(m^2) as calculated from the following:    Height as of 2/8/17: 5' 6\" (1.676 m).    Weight as of this encounter: 141 lb 12.8 oz (64.3 kg).  Medication Reconciliation: complete    "

## 2017-02-16 NOTE — PROGRESS NOTES
F/U for bilateral mastectomy      Subjective:  Pt feels good.  Drains removed earlier this week,    Objective:  B/P: Data Unavailable, T: 98.3, P: 89, R: Data Unavailable  Chest: Incisions healing well.    Path - pending      Assessment/Plan:  Pt s/p bilateral mastectomy.  Doing well.  Await path.  As per oncology.  F/U with me PRN.    Keyon Blanco MD, FACS

## 2017-02-20 ENCOUNTER — TELEPHONE (OUTPATIENT)
Dept: SURGERY | Facility: CLINIC | Age: 67
End: 2017-02-20

## 2017-02-20 NOTE — TELEPHONE ENCOUNTER
Discussed path with patient.  She expressed understanding.   She will f/u with oncology as scheduled.

## 2017-02-28 ENCOUNTER — ONCOLOGY VISIT (OUTPATIENT)
Dept: ONCOLOGY | Facility: CLINIC | Age: 67
End: 2017-02-28
Payer: COMMERCIAL

## 2017-02-28 VITALS
DIASTOLIC BLOOD PRESSURE: 76 MMHG | SYSTOLIC BLOOD PRESSURE: 130 MMHG | TEMPERATURE: 97.9 F | WEIGHT: 139.3 LBS | HEIGHT: 66 IN | OXYGEN SATURATION: 98 % | HEART RATE: 104 BPM | BODY MASS INDEX: 22.39 KG/M2 | RESPIRATION RATE: 16 BRPM

## 2017-02-28 DIAGNOSIS — C50.412 MALIGNANT NEOPLASM OF UPPER-OUTER QUADRANT OF LEFT FEMALE BREAST (H): Primary | ICD-10-CM

## 2017-02-28 PROCEDURE — 99215 OFFICE O/P EST HI 40 MIN: CPT | Performed by: INTERNAL MEDICINE

## 2017-02-28 RX ORDER — METHYLPREDNISOLONE SODIUM SUCCINATE 125 MG/2ML
125 INJECTION, POWDER, LYOPHILIZED, FOR SOLUTION INTRAMUSCULAR; INTRAVENOUS
Status: CANCELLED
Start: 2017-03-07

## 2017-02-28 RX ORDER — SODIUM CHLORIDE 9 MG/ML
1000 INJECTION, SOLUTION INTRAVENOUS CONTINUOUS PRN
Status: CANCELLED
Start: 2017-03-07

## 2017-02-28 RX ORDER — MEPERIDINE HYDROCHLORIDE 25 MG/ML
25 INJECTION INTRAMUSCULAR; INTRAVENOUS; SUBCUTANEOUS EVERY 30 MIN PRN
Status: CANCELLED | OUTPATIENT
Start: 2017-03-07

## 2017-02-28 RX ORDER — ACETAMINOPHEN 325 MG/1
650 TABLET ORAL
Status: CANCELLED
Start: 2017-03-07

## 2017-02-28 RX ORDER — LORAZEPAM 2 MG/ML
.5-1 INJECTION INTRAMUSCULAR EVERY 6 HOURS PRN
Status: CANCELLED | OUTPATIENT
Start: 2017-03-07

## 2017-02-28 RX ORDER — DIPHENHYDRAMINE HYDROCHLORIDE 50 MG/ML
50 INJECTION INTRAMUSCULAR; INTRAVENOUS
Status: CANCELLED
Start: 2017-03-07

## 2017-02-28 RX ORDER — PROCHLORPERAZINE MALEATE 5 MG
10 TABLET ORAL EVERY 6 HOURS PRN
Status: CANCELLED
Start: 2017-03-07

## 2017-02-28 RX ORDER — ALBUTEROL SULFATE 0.83 MG/ML
2.5 SOLUTION RESPIRATORY (INHALATION)
Status: CANCELLED | OUTPATIENT
Start: 2017-03-07

## 2017-02-28 RX ORDER — ALBUTEROL SULFATE 90 UG/1
1-2 AEROSOL, METERED RESPIRATORY (INHALATION)
Status: CANCELLED
Start: 2017-03-07

## 2017-02-28 RX ORDER — LORAZEPAM 0.5 MG/1
.5-1 TABLET ORAL EVERY 6 HOURS PRN
Status: CANCELLED
Start: 2017-03-07

## 2017-02-28 ASSESSMENT — PAIN SCALES - GENERAL: PAINLEVEL: NO PAIN (0)

## 2017-02-28 NOTE — PATIENT INSTRUCTIONS
"  Please follow up with Dr. Ferguson in 6 weeks.  Continue with infusion as scheduled.  Radiation consult has been placed for [facility] in [place].  If you don't hear anything about scheduling by next week, please call Juan José in Oncology.      PET scan needs to be done prior to start of Radiation so please call Juan José if you need to reschedule PET scan elsewhere.  [facility] was full for this Friday.    PET Scan Date/Time: 3/10/17 at 2:00 - Arrival of 1:30 in infusion for port access.  Please see \"Getting Ready for Your PET/CT Scan\" for details.    Follow Up Date/Time:     Colonoscopy Date/Time: 3/27/17 at 11:00 - Please arrive at 10:00    If you have any questions or concerns please feel free to call.    Juan José Hernandez, RN, BSN   Oncology Care Coordinator RN  [facility]  [phone]      "

## 2017-02-28 NOTE — ASSESSMENT & PLAN NOTE
Michaela Dorman  initially felt a mass in her left axilla. She then underwent mammogram which showed dense breasts with no suspicious lesions in 4/2016. She was then seen by surgery and underwent excisional LN biopsy that showed metastatic high-grade poorly differentiated carcinoma with a tumor size of 2.5 cm. Immunohistochemistry was done for ER/WV receptors that came back both negative. HER-2/boubacar was amplified by FISH. She then underwent MRI of the breast on 9/26/2016 that showed suspicious abnormality with multicentric left sided breast cancer that involved the left lateral breast form the nipple to the chest wall. A PET scan was obtained on 10/5/2016 that showed a hypermetabolic opacity in the left axilla wihtout other pathological activity. It was then recommended that she undergo neoadjuvant chemotherapy with Cytoxan and Doxorubicin that will be followed by Taxol, Herceptin. Patient initiated treatment on 10/11/2016.

## 2017-02-28 NOTE — NURSING NOTE
"Michaela Dorman is a 66 year old female who presents for:  Chief Complaint   Patient presents with     Oncology Clinic Visit     6 week follow up for breast cancer     Surgical Followup      Bilateral mastectomies 2/8/2017        Initial Vitals:  /76 (BP Location: Right arm, Patient Position: Chair, Cuff Size: Adult Regular)  Pulse 104  Temp 97.9  F (36.6  C) (Temporal)  Resp 16  Ht 1.676 m (5' 6\")  Wt 63.2 kg (139 lb 4.8 oz)  SpO2 98%  BMI 22.48 kg/m2 Estimated body mass index is 22.48 kg/(m^2) as calculated from the following:    Height as of this encounter: 1.676 m (5' 6\").    Weight as of this encounter: 63.2 kg (139 lb 4.8 oz).. Body surface area is 1.72 meters squared. BP completed using cuff size: regular  No Pain (0) No LMP recorded. Patient is postmenopausal. Allergies and medications reviewed.     Medications: Medication refills not needed today.  Pharmacy name entered into EPIC:    [facility] [address] [place] [address] - [address]  [facility] [place], [place] - [address]  [facility] [place], [place] - [address] [facility]     Comments:      Britta Mims MA        "

## 2017-02-28 NOTE — MR AVS SNAPSHOT
"              After Visit Summary   2/28/2017    Michaela Dorman    MRN: [id number]           Patient Information     Date Of Birth          1950        Visit Information        Provider Department      2/28/2017 1:30 PM Syeda Ferguson MD [facility]        Today's Diagnoses     Malignant neoplasm of upper-outer quadrant of left female breast (H)    -  1      Care Instructions      Please follow up with Dr. Ferguson in 6 weeks.  Continue with infusion as scheduled.  Radiation consult has been placed for [facility] in [place].  If you don't hear anything about scheduling by next week, please call Juan José in Oncology.      PET scan needs to be done prior to start of Radiation so please call Juna José if you need to reschedule PET scan elsewhere.  [place] was full for this Friday.    PET Scan Date/Time: 3/10/17 at 2:00 - Arrival of 1:30 in infusion for port access.  Please see \"Getting Ready for Your PET/CT Scan\" for details.    Follow Up Date/Time:     Colonoscopy Date/Time: 3/27/17 at 11:00 - Please arrive at 10:00    If you have any questions or concerns please feel free to call.    Juan José Hernandez, RN, BSN   Oncology Care Coordinator RN  [facility]  [phone]            Follow-ups after your visit        Additional Services     GASTROENTEROLOGY ADULT REF PROCEDURE ONLY       Last Lab Result: Creatinine (mg/dL)       Date                     Value                 01/24/2017               0.92             ----------  Body mass index is 22.48 kg/(m^2).     Needed:  No  Language:  English    Patient will be contacted to schedule procedure.     Please be aware that coverage of these services is subject to the terms and limitations of your health insurance plan.  Call member services at your health plan with any benefit or coverage questions.  Any procedures must be performed at a [facility] facility OR coordinated by your clinic's referral office.    Please bring " the following with you to your appointment:    (1) Any X-Rays, CTs or MRIs which have been performed.  Contact the facility where they were done to arrange for  prior to your scheduled appointment.    (2) List of current medications   (3) This referral request   (4) Any documents/labs given to you for this referral                  Follow-up notes from your care team     Return in about 6 weeks (around 4/11/2017).      Your next 10 appointments already scheduled     Mar 07, 2017 11:30 AM CST   Level 2 with NL INFUSION CHAIR 3   [facility] ([facility])    [address] [place] [address]   [phone]            Mar 10, 2017  1:30 PM CST   Level 1 with NL INFUSION CHAIR 2   [facility] ([facility])    [address] [place] [address]   [phone]            Mar 10, 2017  2:00 PM CST   PE NPET ONCOLOGY (EYES TO THIGHS) with PHPET1   [facility] PET CT Scan ([facility])    [address] [place] [address]   [phone]           Tell your doctor:   If there is any chance you may be pregnant or if you are breastfeeding.   If you have problems lying in small spaces (claustrophobia). If you do, your doctor may give you medicine to help you relax. If you have diabetes:   Have your exam early in the morning. Your blood glucose will go up as the day goes by.   Your glucose level must be 180 or less at the start of the exam. Please take any medicines you need to ensure this blood glucose level. 24 hours before your scan: Don t do any heavy exercise. (No jogging, aerobics or other workouts.) Exercise will make your pictures less accurate. 6 hours before your scan:   Stop all food and liquids (except water).   Do not chew gum or suck on mints.   If you need to take medicine with food, you may take it with a few crackers.  Please call your Imaging Department at your exam site with any  "questions.              Future tests that were ordered for you today     Open Future Orders        Priority Expected Expires Ordered    PET Oncology (Eyes to Thighs) Routine  3/1/2018 2/28/2017            Who to contact     If you have questions or need follow up information about today's clinic visit or your schedule please contact [facility] directly at [phone].  Normal or non-critical lab and imaging results will be communicated to you by [place], letter or phone within 4 business days after the clinic has received the results. If you do not hear from us within 7 days, please contact the clinic through Bouncefootballt or phone. If you have a critical or abnormal lab result, we will notify you by phone as soon as possible.  Submit refill requests through [employer]ocada or call your pharmacy and they will forward the refill request to us. Please allow 3 business days for your refill to be completed.          Additional Information About Your Visit        BuilkharArrowsight Information     Bocada gives you secure access to your electronic health record. If you see a primary care provider, you can also send messages to your care team and make appointments. If you have questions, please call your primary care clinic.  If you do not have a primary care provider, please call [phone] and they will assist you.        Care EveryWhere ID     This is your Care EveryWhere ID. This could be used by other organizations to access your [facility] medical records  [id number]        Your Vitals Were     Pulse Temperature Respirations Height Pulse Oximetry BMI (Body Mass Index)    104 97.9  F (36.6  C) (Temporal) 16 1.676 m (5' 6\") 98% 22.48 kg/m2       Blood Pressure from Last 3 Encounters:   02/28/17 130/76   02/14/17 146/82   02/09/17 140/80    Weight from Last 3 Encounters:   02/28/17 63.2 kg (139 lb 4.8 oz)   02/16/17 64.3 kg (141 lb 12.8 oz)   02/14/17 63.7 kg (140 lb 8 oz)              We Performed the Following     " GASTROENTEROLOGY ADULT REF PROCEDURE ONLY        Primary Care Provider Office Phone # Fax #    Phani Tapia PA-C [phone]       [facility] [address] [facility] [address]        Thank you!     Thank you for choosing [facility]  for your care. Our goal is always to provide you with excellent care. Hearing back from our patients is one way we can continue to improve our services. Please take a few minutes to complete the written survey that you may receive in the mail after your visit with us. Thank you!             Your Updated Medication List - Protect others around you: Learn how to safely use, store and throw away your medicines at www.disposemymeds.org.          This list is accurate as of: 2/28/17  2:17 PM.  Always use your most recent med list.                   Brand Name Dispense Instructions for use    FLUoxetine 20 MG capsule    PROzac    270 capsule    TAKE THREE CAPSULES BY MOUTH EVERY DAY TITRATE AS DIRECTED       hydrochlorothiazide 25 MG tablet    HYDRODIURIL    90 tablet    TAKE ONE TABLET BY MOUTH ONCE DAILY       HYDROcodone-acetaminophen 5-325 MG per tablet    NORCO    18 tablet    Take 1 tablet by mouth every 4 hours as needed for pain Maximum 8 tablet(s) per day       LORazepam 0.5 MG tablet    ATIVAN    30 tablet    Take 1 tablet (0.5 mg) by mouth every 4 hours as needed (Anxiety, Nausea/Vomiting or Sleep)       * oxyCODONE-acetaminophen 5-325 MG per tablet    PERCOCET    30 tablet    Take 1-2 tablets by mouth every 4 hours as needed for pain (moderate to severe)       * oxyCODONE-acetaminophen 5-325 MG per tablet    PERCOCET    20 tablet    Take 1 tablet by mouth every 4 hours as needed for pain       priLOSEC 20 MG CR capsule   Generic drug:  omeprazole      Take by mouth daily       prochlorperazine 10 MG tablet    COMPAZINE    30 tablet    Take 1 tablet (10 mg) by mouth every 6 hours as needed (Nausea/Vomiting)       simvastatin 20 MG tablet     ZOCOR    90 tablet    Take 1 tablet (20 mg) by mouth At Bedtime       TYLENOL EX ST ARTHRITIS PAIN 500 MG tablet   Generic drug:  acetaminophen      Take 500-1,000 mg by mouth every 6 hours as needed for mild pain       venlafaxine 37.5 MG 24 hr capsule    EFFEXOR XR    30 capsule    Take 1 capsule (37.5 mg) by mouth daily       * Notice:  This list has 2 medication(s) that are the same as other medications prescribed for you. Read the directions carefully, and ask your doctor or other care provider to review them with you.

## 2017-02-28 NOTE — PROGRESS NOTES
Hematology/ Oncology Follow-up Visit:  Feb 28, 2017    Reason for Visit:   Chief Complaint   Patient presents with     Oncology Clinic Visit     6 week follow up for breast cancer     Surgical Followup      Bilateral mastectomies 2/8/2017       Oncologic History:  Malignant neoplasm of upper-outer quadrant of left female breast (H)  Michaela Dorman  initially felt a mass in her left axilla. She then underwent mammogram which showed dense breasts with no suspicious lesions in 4/2016. She was then seen by surgery and underwent excisional LN biopsy that showed metastatic high-grade poorly differentiated carcinoma with a tumor size of 2.5 cm. Immunohistochemistry was done for ER/CT receptors that came back both negative. HER-2/boubacar was amplified by FISH. She then underwent MRI of the breast on 9/26/2016 that showed suspicious abnormality with multicentric left sided breast cancer that involved the left lateral breast form the nipple to the chest wall. A PET scan was obtained on 10/5/2016 that showed a hypermetabolic opacity in the left axilla wihtout other pathological activity. It was then recommended that she undergo neoadjuvant chemotherapy with Cytoxan and Doxorubicin that will be followed by Taxol, Herceptin. Patient initiated treatment on 10/11/2016.       Interval History:  Patient is returning today for follow-up following  bilateral mastectomies on February 8, 2017. She did well since the surgery. Surgical pathology came back with right breast negative for atpia and malignancy. 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 The samples were negative for invasive carcinoma. There was one benign intraparenchymal lymph node. Patient is here today to discuss management plan. She is currently on adjuvant Herceptin therapy. She has been tolerating Herceptin without significant side effects.  Review Of Systems:  Constitutional:  Negative for fever, chills, and night sweats.  Skin: negative.  Eyes: negative.  Ears/Nose/Throat: negative.  Respiratory: No shortness of breath, dyspnea on exertion, cough, or hemoptysis.  Cardiovascular: negative.  Gastrointestinal: negative.  Genitourinary: negative.  Musculoskeletal: negative.  Neurologic: negative.  Psychiatric: negative.  Hematologic/Lymphatic/Immunologic: negative.  Endocrine: negative.    All other ROS negative unless mentioned in interval history.    Past medical, social, surgical, and family histories reviewed.    Allergies:  Allergies as of 02/28/2017 - Oskar as Reviewed 02/28/2017   Allergen Reaction Noted     No known drug allergies  07/05/2002       Current Medications:  Current Outpatient Prescriptions   Medication Sig Dispense Refill     oxyCODONE-acetaminophen (PERCOCET) 5-325 MG per tablet Take 1 tablet by mouth every 4 hours as needed for pain 20 tablet 0     oxyCODONE-acetaminophen (PERCOCET) 5-325 MG per tablet Take 1-2 tablets by mouth every 4 hours as needed for pain (moderate to severe) 30 tablet 0     prochlorperazine (COMPAZINE) 10 MG tablet Take 1 tablet (10 mg) by mouth every 6 hours as needed (Nausea/Vomiting) 30 tablet 3     venlafaxine (EFFEXOR XR) 37.5 MG 24 hr capsule Take 1 capsule (37.5 mg) by mouth daily 30 capsule 1     HYDROcodone-acetaminophen (NORCO) 5-325 MG per tablet Take 1 tablet by mouth every 4 hours as needed for pain Maximum 8 tablet(s) per day 18 tablet 0     LORazepam (ATIVAN) 0.5 MG tablet Take 1 tablet (0.5 mg) by mouth every 4 hours as needed (Anxiety, Nausea/Vomiting or Sleep) 30 tablet 3     hydrochlorothiazide (HYDRODIURIL) 25 MG tablet TAKE ONE TABLET BY MOUTH ONCE DAILY 90 tablet 1     simvastatin (ZOCOR) 20 MG tablet Take 1 tablet (20 mg) by mouth At Bedtime 90 tablet 1     FLUoxetine (PROZAC) 20 MG capsule TAKE THREE CAPSULES BY MOUTH EVERY DAY TITRATE AS DIRECTED 270 capsule 0     acetaminophen (TYLENOL EX ST ARTHRITIS PAIN) 500 MG tablet  "Take 500-1,000 mg by mouth every 6 hours as needed for mild pain       omeprazole (PRILOSEC) 20 MG capsule Take by mouth daily          Physical Exam:  /76 (BP Location: Right arm, Patient Position: Chair, Cuff Size: Adult Regular)  Pulse 104  Temp 97.9  F (36.6  C) (Temporal)  Resp 16  Ht 1.676 m (5' 6\")  Wt 63.2 kg (139 lb 4.8 oz)  SpO2 98%  BMI 22.48 kg/m2  Wt Readings from Last 12 Encounters:   02/28/17 63.2 kg (139 lb 4.8 oz)   02/16/17 64.3 kg (141 lb 12.8 oz)   02/14/17 63.7 kg (140 lb 8 oz)   02/08/17 64.5 kg (142 lb 3.2 oz)   01/24/17 63.6 kg (140 lb 4.8 oz)   01/24/17 63.8 kg (140 lb 9.6 oz)   01/17/17 62.6 kg (138 lb 1.6 oz)   01/03/17 63 kg (138 lb 12.8 oz)   12/27/16 63.6 kg (140 lb 4.8 oz)   12/26/16 63.9 kg (140 lb 12.8 oz)   12/21/16 64.1 kg (141 lb 6.4 oz)   12/20/16 63 kg (139 lb)     ECOG performance status: 0  GENERAL APPEARANCE: Healthy, alert and in no acute distress.  HEENT: Sclerae anicteric. PERRLA. Oropharynx without ulcers, lesions, or thrush.  NECK: Supple. No asymmetry or masses.  LYMPHATICS: No palpable cervical, supraclavicular, axillary, or inguinal lymphadenopathy.  RESP: Lungs clear to auscultation bilaterally without rales, rhonchi or wheezes.  BREAST: Healing scars of bilateral mastectomies without erythema, masses or adenopathy\" \"CARDIOVASCULAR: Regular rate and rhythm. Normal S1, S2; no S3 or S4. No murmur, gallop, or rub.  ABDOMEN: Soft, nontender. Bowel sounds normal. No palpable organomegaly or masses.  MUSCULOSKELETAL: Extremities without gross deformities noted. No edema of bilateral lower extremities.  SKIN: No suspicious lesions or rashes.  NEURO: Alert and oriented x 3. Cranial nerves II-XII grossly intact.  PSYCHIATRIC: Mentation and affect appear normal.    Laboratory/Imaging Studies:  No visits with results within 2 Week(s) from this visit.  Latest known visit with results is:    Infusion Therapy Visit on 02/14/2017   Component Date Value Ref Range Status     " WBC 02/14/2017 7.9  4.0 - 11.0 10e9/L Final     RBC Count 02/14/2017 3.33* 3.8 - 5.2 10e12/L Final     Hemoglobin 02/14/2017 10.6* 11.7 - 15.7 g/dL Final     Hematocrit 02/14/2017 33.1* 35.0 - 47.0 % Final     MCV 02/14/2017 99  78 - 100 fl Final     MCH 02/14/2017 31.8  26.5 - 33.0 pg Final     MCHC 02/14/2017 32.0  31.5 - 36.5 g/dL Final     RDW 02/14/2017 12.8  10.0 - 15.0 % Final     Platelet Count 02/14/2017 284  150 - 450 10e9/L Final     Diff Method 02/14/2017 Automated Method   Final     % Neutrophils 02/14/2017 61.7  % Final     % Lymphocytes 02/14/2017 28.2  % Final     % Monocytes 02/14/2017 7.5  % Final     % Eosinophils 02/14/2017 2.0  % Final     % Basophils 02/14/2017 0.5  % Final     % Immature Granulocytes 02/14/2017 0.1  % Final     Absolute Neutrophil 02/14/2017 4.8  1.6 - 8.3 10e9/L Final     Absolute Lymphocytes 02/14/2017 2.2  0.8 - 5.3 10e9/L Final     Absolute Monocytes 02/14/2017 0.6  0.0 - 1.3 10e9/L Final     Absolute Eosinophils 02/14/2017 0.2  0.0 - 0.7 10e9/L Final     Absolute Basophils 02/14/2017 0.0  0.0 - 0.2 10e9/L Final     Abs Immature Granulocytes 02/14/2017 0.0  0 - 0.4 10e9/L Final        No results found for this or any previous visit (from the past 744 hour(s)).    Assessment and plan:  (C50.412) Malignant neoplasm of upper-outer quadrant of left female breast (H)  (primary encounter diagnosis)  I reviewed with the patient today management of breast cancer again. We talked specifically about adjuvant Herceptin therapy.  I reviewed with the patient today the management plan in details. I reviewed with her the surgical pathology as well. There is no invasive carcinoma was seen in the mastectomy sample. The patient will conclude one year of adjuvant Herceptin therapy. Ejection fraction will be monitored every 3 months while the patient is on therapy. We will arrange for a follow-up PET scan next week  We recommend patient to have postmastectomy radiation therapy. Based on  previous PET scan which showed some activity in the colon,  we will arrange for colonoscopy. I will see the patient again in 6 weeks time or sooner if there is new development or concern  Plan: PET Oncology (Eyes to Thighs)    The patient is ready to learn, no apparent learning barriers were identified.  Diagnosis and treatment plans were explained to the patient. The patient expressed understanding of the content. The patient asked appropriate questions. The patient questions were answered to her satisfaction.    Chart documentation with Dragon Voice recognition Software. Although reviewed after completion, some words and grammatical errors may remain.

## 2017-02-28 NOTE — NURSING NOTE
DISCHARGE PLAN:  Next appointments: See patient instruction section  Departure Mode: Ambulatory  Accompanied by: sister and friend  15 minutes for nursing discharge (face to face time)     Michaela Dorman is here today for 6 week Oncology follow up for Breast Cancer.  Writing nurse seen patient after Medical Oncology appointment to address questions/concerns/coordinate care. Patient to have PET scan prior to start of Radiation.  PET scan scheduled.  ARSEN signed and records being sent to [facility] for scheduling.  Colonoscopy scheduled.  Follow up with Oncology in 6 weeks. Patient ambulated by nurse to  to schedule follow up and/or lab appointments. See patient instructions and Oncologist's Progress note for further details. Questions and concerns addressed to patient's satisfaction. Patient verbalized and demonstrated understanding of plan.  Contact information provided and patient is encouraged to call with any that arise in the interim of care.    Juan José Hernandez, RN, BSN, OCN   Oncology Care Coordinator RN  [facility]  [phone]  2/28/2017, 2:28 PM

## 2017-03-02 DIAGNOSIS — F43.21 ADJUSTMENT DISORDER WITH DEPRESSED MOOD: ICD-10-CM

## 2017-03-02 RX ORDER — VENLAFAXINE HYDROCHLORIDE 37.5 MG/1
37.5 CAPSULE, EXTENDED RELEASE ORAL DAILY
Qty: 30 CAPSULE | Refills: 1 | Status: SHIPPED | OUTPATIENT
Start: 2017-03-02 | End: 2017-05-07

## 2017-03-02 NOTE — TELEPHONE ENCOUNTER
Venlafaxine      Last Written Prescription Date: 01/03/2017  Last Fill Quantity: 30, # refills: 1  Last Office Visit with [facility] primary care provider:  02/28/2017   Next 5 appointments (look out 90 days)     Apr 11, 2017  3:00 PM CDT   [facility] Visit with Syeda Ferguson MD   [facility]    [address] [place] [id number] [phone]                   Last PHQ-9 score on record=   PHQ-9 SCORE 8/31/2016   Total Score -   Total Score 0       Thanks  Luz Marina [facility]   [phone]

## 2017-03-07 ENCOUNTER — TELEPHONE (OUTPATIENT)
Dept: ONCOLOGY | Facility: CLINIC | Age: 67
End: 2017-03-07

## 2017-03-07 ENCOUNTER — INFUSION THERAPY VISIT (OUTPATIENT)
Dept: INFUSION THERAPY | Facility: CLINIC | Age: 67
End: 2017-03-07
Attending: INTERNAL MEDICINE
Payer: MEDICARE

## 2017-03-07 VITALS
SYSTOLIC BLOOD PRESSURE: 134 MMHG | HEART RATE: 91 BPM | HEIGHT: 66 IN | OXYGEN SATURATION: 97 % | RESPIRATION RATE: 18 BRPM | TEMPERATURE: 98.1 F | WEIGHT: 140.6 LBS | BODY MASS INDEX: 22.6 KG/M2 | DIASTOLIC BLOOD PRESSURE: 68 MMHG

## 2017-03-07 DIAGNOSIS — C50.412 MALIGNANT NEOPLASM OF UPPER-OUTER QUADRANT OF LEFT FEMALE BREAST (H): Primary | ICD-10-CM

## 2017-03-07 DIAGNOSIS — F43.21 ADJUSTMENT DISORDER WITH DEPRESSED MOOD: Primary | ICD-10-CM

## 2017-03-07 DIAGNOSIS — Z51.11 ENCOUNTER FOR ANTINEOPLASTIC CHEMOTHERAPY: ICD-10-CM

## 2017-03-07 PROCEDURE — 96413 CHEMO IV INFUSION 1 HR: CPT

## 2017-03-07 PROCEDURE — 25000128 H RX IP 250 OP 636: Performed by: INTERNAL MEDICINE

## 2017-03-07 RX ORDER — HEPARIN SODIUM (PORCINE) LOCK FLUSH IV SOLN 100 UNIT/ML 100 UNIT/ML
500 SOLUTION INTRAVENOUS EVERY 8 HOURS
Status: DISCONTINUED | OUTPATIENT
Start: 2017-03-07 | End: 2017-03-07 | Stop reason: HOSPADM

## 2017-03-07 RX ORDER — HEPARIN SODIUM (PORCINE) LOCK FLUSH IV SOLN 100 UNIT/ML 100 UNIT/ML
500 SOLUTION INTRAVENOUS EVERY 8 HOURS
Status: CANCELLED
Start: 2017-03-07

## 2017-03-07 RX ADMIN — SODIUM CHLORIDE, PRESERVATIVE FREE 500 UNITS: 5 INJECTION INTRAVENOUS at 13:25

## 2017-03-07 RX ADMIN — SODIUM CHLORIDE 250 ML: 9 INJECTION, SOLUTION INTRAVENOUS at 12:00

## 2017-03-07 RX ADMIN — Medication 382 MG: at 12:31

## 2017-03-07 ASSESSMENT — PAIN SCALES - GENERAL: PAINLEVEL: NO PAIN (0)

## 2017-03-07 NOTE — TELEPHONE ENCOUNTER
Routing refill request to provider for review/approval because:  A break in medication, patient should have been out of medication in October    Dotty Barriga RN  [facility]

## 2017-03-07 NOTE — PROGRESS NOTES
Accessed port-a-cath, blood return noted, flushed with NS per protocol.  Tolerated Herceptin infusion, discharged to home ambulatory and in stable condition.

## 2017-03-07 NOTE — PATIENT INSTRUCTIONS
Flushed port-a-cath post infusion with NS and heparin per protocol.  De-accessed port with blood return noted.

## 2017-03-07 NOTE — TELEPHONE ENCOUNTER
FLUoxetine (PROZAC) 20 MG capsule     Last Written Prescription Date: 7/14/16  Last Fill Quantity: 270, # refills: 0  Last Office Visit with [facility] primary care provider:  1/24/17   Next 5 appointments (look out 90 days)     Apr 11, 2017  3:00 PM CDT   [facility] Visit with Syeda Ferguson MD   [facility]    [address] [place] [id number] [phone]                   Last PHQ-9 score on record=   PHQ-9 SCORE 8/31/2016   Total Score -   Total Score 0

## 2017-03-07 NOTE — TELEPHONE ENCOUNTER
Reason for Call:  Other call back    Detailed comments: Katja from [facility] called asking for results on path mastectomy report and needs PET results from January and will need patient to bring disc of 3/10 PET results with to her appointment with them on 3/15    Phone Number Patient can be reached at: Home number on file [phone] (home) or Other phone number:  [phone] ext [phone]  FAX: [phone]    Best Time: 8-5    Can we leave a detailed message on this number? NO    Call taken on 3/7/2017 at 3:42 PM by Mercedes Thomason

## 2017-03-10 ENCOUNTER — INFUSION THERAPY VISIT (OUTPATIENT)
Dept: INFUSION THERAPY | Facility: CLINIC | Age: 67
End: 2017-03-10
Attending: INTERNAL MEDICINE
Payer: MEDICARE

## 2017-03-10 ENCOUNTER — HOSPITAL ENCOUNTER (OUTPATIENT)
Dept: PET IMAGING | Facility: CLINIC | Age: 67
Discharge: HOME | End: 2017-03-10
Attending: INTERNAL MEDICINE | Admitting: INTERNAL MEDICINE
Payer: MEDICARE

## 2017-03-10 DIAGNOSIS — C50.412 MALIGNANT NEOPLASM OF UPPER-OUTER QUADRANT OF LEFT FEMALE BREAST (H): ICD-10-CM

## 2017-03-10 DIAGNOSIS — C50.412 MALIGNANT NEOPLASM OF UPPER-OUTER QUADRANT OF LEFT FEMALE BREAST (H): Primary | ICD-10-CM

## 2017-03-10 PROCEDURE — 34300033 ZZH RX 343: Performed by: INTERNAL MEDICINE

## 2017-03-10 PROCEDURE — 96523 IRRIG DRUG DELIVERY DEVICE: CPT

## 2017-03-10 PROCEDURE — A9552 F18 FDG: HCPCS | Performed by: INTERNAL MEDICINE

## 2017-03-10 PROCEDURE — 74177 CT ABD & PELVIS W/CONTRAST: CPT

## 2017-03-10 PROCEDURE — 25000128 H RX IP 250 OP 636: Performed by: INTERNAL MEDICINE

## 2017-03-10 PROCEDURE — 25500064 ZZH RX 255 OP 636: Performed by: INTERNAL MEDICINE

## 2017-03-10 PROCEDURE — 71260 CT THORAX DX C+: CPT

## 2017-03-10 RX ORDER — LORAZEPAM 2 MG/ML
.5-1 INJECTION INTRAMUSCULAR EVERY 6 HOURS PRN
Status: CANCELLED | OUTPATIENT
Start: 2017-03-28

## 2017-03-10 RX ORDER — HEPARIN SODIUM (PORCINE) LOCK FLUSH IV SOLN 100 UNIT/ML 100 UNIT/ML
500 SOLUTION INTRAVENOUS EVERY 8 HOURS
Status: DISCONTINUED | OUTPATIENT
Start: 2017-03-10 | End: 2017-03-10 | Stop reason: HOSPADM

## 2017-03-10 RX ORDER — HEPARIN SODIUM (PORCINE) LOCK FLUSH IV SOLN 100 UNIT/ML 100 UNIT/ML
500 SOLUTION INTRAVENOUS EVERY 8 HOURS
Status: CANCELLED
Start: 2017-03-10

## 2017-03-10 RX ORDER — ACETAMINOPHEN 325 MG/1
650 TABLET ORAL
Status: CANCELLED
Start: 2017-03-28

## 2017-03-10 RX ORDER — DIPHENHYDRAMINE HYDROCHLORIDE 50 MG/ML
50 INJECTION INTRAMUSCULAR; INTRAVENOUS
Status: CANCELLED
Start: 2017-03-28

## 2017-03-10 RX ORDER — IOPAMIDOL 755 MG/ML
100 INJECTION, SOLUTION INTRAVASCULAR ONCE
Status: COMPLETED | OUTPATIENT
Start: 2017-03-10 | End: 2017-03-10

## 2017-03-10 RX ORDER — ALBUTEROL SULFATE 90 UG/1
1-2 AEROSOL, METERED RESPIRATORY (INHALATION)
Status: CANCELLED
Start: 2017-03-28

## 2017-03-10 RX ORDER — PROCHLORPERAZINE MALEATE 5 MG
10 TABLET ORAL EVERY 6 HOURS PRN
Status: CANCELLED
Start: 2017-03-28

## 2017-03-10 RX ORDER — SODIUM CHLORIDE 9 MG/ML
1000 INJECTION, SOLUTION INTRAVENOUS CONTINUOUS PRN
Status: CANCELLED
Start: 2017-03-28

## 2017-03-10 RX ORDER — METHYLPREDNISOLONE SODIUM SUCCINATE 125 MG/2ML
125 INJECTION, POWDER, LYOPHILIZED, FOR SOLUTION INTRAMUSCULAR; INTRAVENOUS
Status: CANCELLED
Start: 2017-03-28

## 2017-03-10 RX ORDER — ALBUTEROL SULFATE 0.83 MG/ML
2.5 SOLUTION RESPIRATORY (INHALATION)
Status: CANCELLED | OUTPATIENT
Start: 2017-03-28

## 2017-03-10 RX ORDER — LORAZEPAM 0.5 MG/1
.5-1 TABLET ORAL EVERY 6 HOURS PRN
Status: CANCELLED
Start: 2017-03-28

## 2017-03-10 RX ORDER — MEPERIDINE HYDROCHLORIDE 25 MG/ML
25 INJECTION INTRAMUSCULAR; INTRAVENOUS; SUBCUTANEOUS EVERY 30 MIN PRN
Status: CANCELLED | OUTPATIENT
Start: 2017-03-28

## 2017-03-10 RX ADMIN — SODIUM CHLORIDE, PRESERVATIVE FREE 500 UNITS: 5 INJECTION INTRAVENOUS at 15:10

## 2017-03-10 RX ADMIN — IOPAMIDOL 100 ML: 755 INJECTION, SOLUTION INTRAVENOUS at 14:46

## 2017-03-10 RX ADMIN — FLUDEOXYGLUCOSE F-18 14.24 MCI.: 500 INJECTION, SOLUTION INTRAVENOUS at 13:46

## 2017-03-10 NOTE — PROGRESS NOTES
Here for power port access prior to PET scan.  Pt ret'd post scan for de-access.  Pt discharged in stable condition.

## 2017-03-14 ENCOUNTER — TELEPHONE (OUTPATIENT)
Dept: FAMILY MEDICINE | Facility: OTHER | Age: 67
End: 2017-03-14

## 2017-03-14 DIAGNOSIS — Z51.11 ENCOUNTER FOR ANTINEOPLASTIC CHEMOTHERAPY: ICD-10-CM

## 2017-03-14 DIAGNOSIS — C50.412 MALIGNANT NEOPLASM OF UPPER-OUTER QUADRANT OF LEFT FEMALE BREAST (H): ICD-10-CM

## 2017-03-14 RX ORDER — LORAZEPAM 0.5 MG/1
0.5 TABLET ORAL EVERY 4 HOURS PRN
Qty: 15 TABLET | Refills: 0 | Status: SHIPPED | OUTPATIENT
Start: 2017-03-14 | End: 2018-02-06

## 2017-03-14 NOTE — TELEPHONE ENCOUNTER
Reason for Call:  Medication or medication refill:    Do you use a [facility]?  Name of the pharmacy and phone number for the current request:  [facility] - [phone]    Name of the medication requested: Something for anxiety    Other request: Pt states with all that is going on she thinks this will help her    Can we leave a detailed message on this number? YES    Phone number patient can be reached at: Home number on file [phone] (home)    Best Time: any    Call taken on 3/14/2017 at 9:22 AM by Keren Garcia

## 2017-03-15 ENCOUNTER — TRANSFERRED RECORDS (OUTPATIENT)
Dept: HEALTH INFORMATION MANAGEMENT | Facility: CLINIC | Age: 67
End: 2017-03-15

## 2017-03-21 ENCOUNTER — OFFICE VISIT (OUTPATIENT)
Dept: URGENT CARE | Facility: RETAIL CLINIC | Age: 67
End: 2017-03-21
Payer: COMMERCIAL

## 2017-03-21 VITALS
WEIGHT: 141.6 LBS | BODY MASS INDEX: 22.85 KG/M2 | HEART RATE: 92 BPM | OXYGEN SATURATION: 96 % | TEMPERATURE: 97.3 F | SYSTOLIC BLOOD PRESSURE: 141 MMHG | DIASTOLIC BLOOD PRESSURE: 85 MMHG

## 2017-03-21 DIAGNOSIS — R30.0 DYSURIA: Primary | ICD-10-CM

## 2017-03-21 LAB
APPEARANCE UR: CLEAR
BILIRUB UR QL: NORMAL
COLOR UR: NORMAL
GLUCOSE URINE: NORMAL MG/DL
HGB UR QL: NORMAL
KETONES UR QL: NORMAL MG/DL
LEUKOCYTE ESTERASE URINE: NORMAL
NITRITE UR QL STRIP: NORMAL
PH UR STRIP: 6 PH (ref 5–7)
PROTEIN ALBUMIN URINE: NORMAL MG/DL
SOURCE: NORMAL
SP GR UR STRIP: 1.01 (ref 1–1.03)
UROBILINOGEN UR QL STRIP: 0.2 EU/DL (ref 0.2–1)

## 2017-03-21 PROCEDURE — 99213 OFFICE O/P EST LOW 20 MIN: CPT | Performed by: INTERNAL MEDICINE

## 2017-03-21 PROCEDURE — 87086 URINE CULTURE/COLONY COUNT: CPT | Performed by: INTERNAL MEDICINE

## 2017-03-21 PROCEDURE — 81002 URINALYSIS NONAUTO W/O SCOPE: CPT | Mod: QW | Performed by: INTERNAL MEDICINE

## 2017-03-21 RX ORDER — CIPROFLOXACIN 500 MG/1
500 TABLET, FILM COATED ORAL 2 TIMES DAILY
Qty: 14 TABLET | Refills: 0 | Status: SHIPPED | OUTPATIENT
Start: 2017-03-21 | End: 2017-03-29

## 2017-03-21 ASSESSMENT — PAIN SCALES - GENERAL: PAINLEVEL: MILD PAIN (3)

## 2017-03-21 NOTE — PROGRESS NOTES
[facility] Care Progress Note        Rafat Abrams MD, MPH  03/21/2017         History:      Michaela Dorman is a pleasant 66 year old year old female who presents today with a possible UTI.   Symptoms of dysuria, urinary urgency and hesitancy for 1 week .    No Hematuria. No abdominal or flank pain. There is no history of fever, chills, nausea or vomiting.  No vaginal discharge.   LMP: No LMP recorded. Patient is postmenopausal.            Assessment and Plan:         UTI:  - Urine Culture Aerobic Bacterial  - HCL U/A, W/O MICRO, NON AUTO  - ciprofloxacin (CIPRO) 500 MG tablet; Take 1 tablet (500 mg) by mouth 2 times daily  Dispense: 14 tablet; Refill: 0  Advised to increase water and fluid intake.   Prevention and treatment of UTI's discussed.Signs and symptoms of pyelonephritis mentioned.   F/u w PCP in 4-5 days, earlier if symptoms worsen.                     Physical Exam:      /85 (BP Location: Right arm, Patient Position: Chair, Cuff Size: Adult Regular)  Pulse 92  Temp 97.3  F (36.3  C) (Tympanic)  Wt 141 lb 9.6 oz (64.2 kg)  SpO2 96%  BMI 22.85 kg/m2     Constitutional: Patient is in no distress The patient is pleasant and cooperative.   HEENT: Head:  Head is atraumatic, normocephalic.    Eyes: Pupils are equal, round and reactive to light and accomodation.  Sclera is non-icteric. No conjunctival injection, or exudate noted. Extraocular motion is intact. Visual acuity is intact bilaterally.  Ears:  External acoustic canals are patent and clear.  There is no erythema and bulging( exudate)  of the ( R/L ) tympanic membrane(s ).   Nose:  No Nasal congestion w/o drainage or mucosal ulceration is noted.  Throat:  Oral mucosa is moist.  No oral lesions are noted.  No posterior pharyngeal hyperemia nor exudate noted.     Neck Supple.  There is no cervical lymphadenopathy.  No nuchal rigidity noted.  There is no meningismus.     Cardiovascular: Heart is regular to rate and rhythm.  No  murmur is noted.     Lungs: Clear in the anterior and posterior pulmonary fields.   Abdomen: Soft and non-tender.    Back No flank tenderness is noted.   Extremeties No edema, no calf tenderness.   Neuro: No focal deficit.   Skin No petechiae or purpura is noted.  There is no rash.   Mood Normal              Data:      All new lab and imaging data was reviewed.   Results for orders placed or performed in visit on 03/21/17   HCL U/A, W/O MICRO, NON AUTO   Result Value Ref Range    Source Mid Stream     Color Urine Straw     Appearance Urine Clear     Glucose Urine Neg neg mg/dL    Bilirubin Urine Neg neg    Ketones Urine Neg neg mg/dL    Specific Gravity Urine 1.015 1.003 - 1.035    Blood Urine Mod neg    pH Urine 6.0 5.0 - 7.0 pH    Protein Albumin Urine Trace neg mg/dL    Urobilinogen Urine 0.2 0.2 - 1.0 EU/dL    Nitrite Urine Neg NEG    Leukocyte Esterase Urine Neg NEG

## 2017-03-21 NOTE — MR AVS SNAPSHOT
After Visit Summary   3/21/2017    Michaela Dorman    MRN: [id number]           Patient Information     Date Of Birth          1950        Visit Information        Provider Department      3/21/2017 11:40 AM Rafat Abrams MD [facility]        Today's Diagnoses     Dysuria    -  1       Follow-ups after your visit        Your next 10 appointments already scheduled     Mar 22, 2017 10:00 AM CDT   Office Visit with Phani Tapia PA-C   [facility] ([facility])    [address] [facility] [id number] [phone]           Bring a current list of meds and any records pertaining to this visit.  For Physicals, please bring immunization records and any forms needing to be filled out.  Please arrive 10 minutes early to complete paperwork.            Mar 27, 2017   Procedure with Damien Pollard MD   [facility] ([facility])    [address] [place] [address]   [phone]            Mar 28, 2017 11:30 AM CDT   Level 2 with NL INFUSION CHAIR 5   [facility] ([facility])    [address] [place] [id number] [phone]            Apr 11, 2017  3:00 PM CDT   Return Visit with Syeda Ferguson MD   [facility]    [address] [place] [address] [phone]              Who to contact     You can reach your care team any time of the day by calling [phone].  Notification of test results:  If you have an abnormal lab result, we will notify you by phone as soon as possible.         Additional Information About Your Visit        MyChart Information     ClickSquaredt gives you secure access to your electronic health record. If you see a primary care provider, you can also send messages to your care team and make appointments. If you have questions, please call your primary care clinic.  If you  do not have a primary care provider, please call [phone] and they will assist you.        Care EveryWhere ID     This is your Care EveryWhere ID. This could be used by other organizations to access your [place] medical records  [id number]        Your Vitals Were     Pulse Temperature Pulse Oximetry BMI (Body Mass Index)          92 97.3  F (36.3  C) (Tympanic) 96% 22.85 kg/m2         Blood Pressure from Last 3 Encounters:   03/21/17 141/85   03/07/17 134/68   02/28/17 130/76    Weight from Last 3 Encounters:   03/21/17 141 lb 9.6 oz (64.2 kg)   03/07/17 140 lb 9.6 oz (63.8 kg)   02/28/17 139 lb 4.8 oz (63.2 kg)              We Performed the Following     HCL U/A, W/O MICRO, NON AUTO     Urine Culture Aerobic Bacterial          Today's Medication Changes          These changes are accurate as of: 3/21/17 12:16 PM.  If you have any questions, ask your nurse or doctor.               Start taking these medicines.        Dose/Directions    ciprofloxacin 500 MG tablet   Commonly known as:  CIPRO   Used for:  Dysuria   Started by:  Rafat Abrams MD        Dose:  500 mg   Take 1 tablet (500 mg) by mouth 2 times daily   Quantity:  14 tablet   Refills:  0            Where to get your medicines      These medications were sent to [address]  [address], [facility] [address]     Phone:  [phone]     ciprofloxacin 500 MG tablet                Primary Care Provider Office Phone # Fax #    Phani Tapia PA-C [phone]       [facility] [address] [facility] [address]        Thank you!     Thank you for choosing [facility]  for your care. Our goal is always to provide you with excellent care. Hearing back from our patients is one way we can continue to improve our services. Please take a few minutes to complete the written survey that you may receive in the mail after your visit with us. Thank you!             Your Updated  Medication List - Protect others around you: Learn how to safely use, store and throw away your medicines at www.disposemymeds.org.          This list is accurate as of: 3/21/17 12:16 PM.  Always use your most recent med list.                   Brand Name Dispense Instructions for use    ciprofloxacin 500 MG tablet    CIPRO    14 tablet    Take 1 tablet (500 mg) by mouth 2 times daily       * FLUoxetine 20 MG capsule    PROzac    270 capsule    TAKE THREE CAPSULES BY MOUTH EVERY DAY TITRATE AS DIRECTED       * FLUoxetine 20 MG capsule    PROzac    270 capsule    TAKE THREE CAPSULES BY MOUTH EVERY DAY TITRATE AS DIRECTED       hydrochlorothiazide 25 MG tablet    HYDRODIURIL    90 tablet    TAKE ONE TABLET BY MOUTH ONCE DAILY       HYDROcodone-acetaminophen 5-325 MG per tablet    NORCO    18 tablet    Take 1 tablet by mouth every 4 hours as needed for pain Maximum 8 tablet(s) per day       LORazepam 0.5 MG tablet    ATIVAN    15 tablet    Take 1 tablet (0.5 mg) by mouth every 4 hours as needed (Anxiety, Nausea/Vomiting or Sleep)       * oxyCODONE-acetaminophen 5-325 MG per tablet    PERCOCET    30 tablet    Take 1-2 tablets by mouth every 4 hours as needed for pain (moderate to severe)       * oxyCODONE-acetaminophen 5-325 MG per tablet    PERCOCET    20 tablet    Take 1 tablet by mouth every 4 hours as needed for pain       priLOSEC 20 MG CR capsule   Generic drug:  omeprazole      Take by mouth daily       prochlorperazine 10 MG tablet    COMPAZINE    30 tablet    Take 1 tablet (10 mg) by mouth every 6 hours as needed (Nausea/Vomiting)       simvastatin 20 MG tablet    ZOCOR    90 tablet    Take 1 tablet (20 mg) by mouth At Bedtime       TYLENOL EX ST ARTHRITIS PAIN 500 MG tablet   Generic drug:  acetaminophen      Take 500-1,000 mg by mouth every 6 hours as needed for mild pain       venlafaxine 37.5 MG 24 hr capsule    EFFEXOR XR    30 capsule    Take 1 capsule (37.5 mg) by mouth daily       * Notice:  This list  has 4 medication(s) that are the same as other medications prescribed for you. Read the directions carefully, and ask your doctor or other care provider to review them with you.

## 2017-03-21 NOTE — NURSING NOTE
"Chief Complaint   Patient presents with     Urinary Problem     burning, frequency. about a week       Initial /85 (BP Location: Right arm, Patient Position: Chair, Cuff Size: Adult Regular)  Pulse 92  Temp 97.3  F (36.3  C) (Tympanic)  Wt 141 lb 9.6 oz (64.2 kg)  SpO2 96%  BMI 22.85 kg/m2 Estimated body mass index is 22.85 kg/(m^2) as calculated from the following:    Height as of 3/7/17: 5' 6\" (1.676 m).    Weight as of this encounter: 141 lb 9.6 oz (64.2 kg).  Medication Reconciliation: complete   Francisca Chand CMA (AAMA)      "

## 2017-03-23 ENCOUNTER — TELEPHONE (OUTPATIENT)
Dept: URGENT CARE | Facility: RETAIL CLINIC | Age: 67
End: 2017-03-23

## 2017-03-23 LAB
BACTERIA SPEC CULT: NO GROWTH
MICRO REPORT STATUS: NORMAL
SPECIMEN SOURCE: NORMAL

## 2017-03-23 NOTE — TELEPHONE ENCOUNTER
No growth on culture, if still having symptoms needs to see PCP.  If on med and no improvement then stop medication.

## 2017-03-28 ENCOUNTER — INFUSION THERAPY VISIT (OUTPATIENT)
Dept: INFUSION THERAPY | Facility: CLINIC | Age: 67
End: 2017-03-28
Attending: INTERNAL MEDICINE
Payer: MEDICARE

## 2017-03-28 VITALS
RESPIRATION RATE: 16 BRPM | BODY MASS INDEX: 22.77 KG/M2 | TEMPERATURE: 98.3 F | DIASTOLIC BLOOD PRESSURE: 68 MMHG | WEIGHT: 141.7 LBS | HEART RATE: 90 BPM | HEIGHT: 66 IN | SYSTOLIC BLOOD PRESSURE: 140 MMHG

## 2017-03-28 DIAGNOSIS — C50.412 MALIGNANT NEOPLASM OF UPPER-OUTER QUADRANT OF LEFT FEMALE BREAST (H): ICD-10-CM

## 2017-03-28 DIAGNOSIS — Z51.11 ENCOUNTER FOR ANTINEOPLASTIC CHEMOTHERAPY: Primary | ICD-10-CM

## 2017-03-28 PROCEDURE — 25000128 H RX IP 250 OP 636: Performed by: INTERNAL MEDICINE

## 2017-03-28 PROCEDURE — 96413 CHEMO IV INFUSION 1 HR: CPT

## 2017-03-28 RX ORDER — DIPHENHYDRAMINE HYDROCHLORIDE 50 MG/ML
50 INJECTION INTRAMUSCULAR; INTRAVENOUS
Status: DISCONTINUED | OUTPATIENT
Start: 2017-03-28 | End: 2017-03-28 | Stop reason: HOSPADM

## 2017-03-28 RX ORDER — HEPARIN SODIUM (PORCINE) LOCK FLUSH IV SOLN 100 UNIT/ML 100 UNIT/ML
500 SOLUTION INTRAVENOUS EVERY 8 HOURS
Status: CANCELLED
Start: 2017-03-28

## 2017-03-28 RX ORDER — HEPARIN SODIUM (PORCINE) LOCK FLUSH IV SOLN 100 UNIT/ML 100 UNIT/ML
500 SOLUTION INTRAVENOUS EVERY 8 HOURS
Status: DISCONTINUED | OUTPATIENT
Start: 2017-03-28 | End: 2017-03-28 | Stop reason: HOSPADM

## 2017-03-28 RX ORDER — ACETAMINOPHEN 325 MG/1
650 TABLET ORAL
Status: DISCONTINUED | OUTPATIENT
Start: 2017-03-28 | End: 2017-03-28 | Stop reason: HOSPADM

## 2017-03-28 RX ADMIN — Medication 382 MG: at 12:19

## 2017-03-28 RX ADMIN — SODIUM CHLORIDE, PRESERVATIVE FREE 500 UNITS: 5 INJECTION INTRAVENOUS at 13:08

## 2017-03-28 NOTE — MR AVS SNAPSHOT
After Visit Summary   3/28/2017    Michaela Dorman    MRN: [id number]           Patient Information     Date Of Birth          1950        Visit Information        Provider Department      3/28/2017 11:30 AM NL INFUSION CHAIR 5 [facility]        Today's Diagnoses     Encounter for antineoplastic chemotherapy    -  1    Malignant neoplasm of upper-outer quadrant of left female breast (H)           Follow-ups after your visit        Your next 10 appointments already scheduled     Mar 29, 2017 10:00 AM CDT   Office Visit with Phani Tapia PA-C   [facility]    [address] [facility] [id number] [phone]           Bring a current list of meds and any records pertaining to this visit.  For Physicals, please bring immunization records and any forms needing to be filled out.  Please arrive 10 minutes early to complete paperwork.            Apr 11, 2017  3:00 PM CDT   Return Visit with Syeda Ferguson MD   [facility] ([facility])    [address] [place] [address] [phone]            Apr 18, 2017 10:00 AM CDT   Level 2 with NL INFUSION CHAIR 2   [facility] ([facility])    [address] [place] [address] [phone]              Who to contact     If you have questions or need follow up information about today's clinic visit or your schedule please contact [facility] directly at [phone].  Normal or non-critical lab and imaging results will be communicated to you by MyChart, letter or phone within 4 business days after the clinic has received the results. If you do not hear from us within 7 days, please contact the clinic through [employer] or phone. If you have a critical or abnormal lab result, we will notify you by phone as soon as possible.  Submit refill requests through RebelMailt or call  "your pharmacy and they will forward the refill request to us. Please allow 3 business days for your refill to be completed.          Additional Information About Your Visit        [employer] Information     DNsolution gives you secure access to your electronic health record. If you see a primary care provider, you can also send messages to your care team and make appointments. If you have questions, please call your primary care clinic.  If you do not have a primary care provider, please call [phone] and they will assist you.        Care EveryWhere ID     This is your Care EveryWhere ID. This could be used by other organizations to access your [place] medical records  [id number]        Your Vitals Were     Pulse Temperature Respirations Height BMI (Body Mass Index)       90 98.3  F (36.8  C) (Temporal) 16 1.677 m (5' 6.02\") 22.85 kg/m2        Blood Pressure from Last 3 Encounters:   03/28/17 140/68   03/21/17 141/85   03/07/17 134/68    Weight from Last 3 Encounters:   03/28/17 64.3 kg (141 lb 11.2 oz)   03/21/17 64.2 kg (141 lb 9.6 oz)   03/07/17 63.8 kg (140 lb 9.6 oz)              We Performed the Following     Treatment Conditions        Primary Care Provider Office Phone # Fax #    Phani Tapia PA-C [phone]       [facility] [address] [facility] [address]        Thank you!     Thank you for choosing [facility]  for your care. Our goal is always to provide you with excellent care. Hearing back from our patients is one way we can continue to improve our services. Please take a few minutes to complete the written survey that you may receive in the mail after your visit with us. Thank you!             Your Updated Medication List - Protect others around you: Learn how to safely use, store and throw away your medicines at www.disposemymeds.org.          This list is accurate as of: 3/28/17  2:43 PM.  Always use your most recent med list.                   " Brand Name Dispense Instructions for use    ciprofloxacin 500 MG tablet    CIPRO    14 tablet    Take 1 tablet (500 mg) by mouth 2 times daily       * FLUoxetine 20 MG capsule    PROzac    270 capsule    TAKE THREE CAPSULES BY MOUTH EVERY DAY TITRATE AS DIRECTED       * FLUoxetine 20 MG capsule    PROzac    270 capsule    TAKE THREE CAPSULES BY MOUTH EVERY DAY TITRATE AS DIRECTED       hydrochlorothiazide 25 MG tablet    HYDRODIURIL    90 tablet    TAKE ONE TABLET BY MOUTH ONCE DAILY       LORazepam 0.5 MG tablet    ATIVAN    15 tablet    Take 1 tablet (0.5 mg) by mouth every 4 hours as needed (Anxiety, Nausea/Vomiting or Sleep)       priLOSEC 20 MG CR capsule   Generic drug:  omeprazole      Take by mouth daily       simvastatin 20 MG tablet    ZOCOR    90 tablet    Take 1 tablet (20 mg) by mouth At Bedtime       TYLENOL EX ST ARTHRITIS PAIN 500 MG tablet   Generic drug:  acetaminophen      Take 500-1,000 mg by mouth every 6 hours as needed for mild pain       venlafaxine 37.5 MG 24 hr capsule    EFFEXOR XR    30 capsule    Take 1 capsule (37.5 mg) by mouth daily       * Notice:  This list has 2 medication(s) that are the same as other medications prescribed for you. Read the directions carefully, and ask your doctor or other care provider to review them with you.

## 2017-03-28 NOTE — PROGRESS NOTES
Pt here for her Herceptin. BSA ok Labs Ok Dose OK. Chemo checked with RN X 2. VSS no reaction noted good blood return noted pre and post infusion. Next appointment 04/18/17 at 1000 hrs

## 2017-03-29 ENCOUNTER — OFFICE VISIT (OUTPATIENT)
Dept: FAMILY MEDICINE | Facility: OTHER | Age: 67
End: 2017-03-29
Payer: COMMERCIAL

## 2017-03-29 VITALS
BODY MASS INDEX: 23.11 KG/M2 | RESPIRATION RATE: 16 BRPM | WEIGHT: 143.3 LBS | TEMPERATURE: 97.9 F | OXYGEN SATURATION: 97 % | DIASTOLIC BLOOD PRESSURE: 80 MMHG | SYSTOLIC BLOOD PRESSURE: 136 MMHG | HEART RATE: 80 BPM

## 2017-03-29 DIAGNOSIS — R82.90 NONSPECIFIC FINDING ON EXAMINATION OF URINE: ICD-10-CM

## 2017-03-29 DIAGNOSIS — N30.00 ACUTE CYSTITIS WITHOUT HEMATURIA: ICD-10-CM

## 2017-03-29 DIAGNOSIS — R30.0 DYSURIA: Primary | ICD-10-CM

## 2017-03-29 DIAGNOSIS — F41.9 ANXIETY: ICD-10-CM

## 2017-03-29 LAB
ALBUMIN UR-MCNC: NEGATIVE MG/DL
APPEARANCE UR: CLEAR
BACTERIA #/AREA URNS HPF: ABNORMAL /HPF
BILIRUB UR QL STRIP: NEGATIVE
COLOR UR AUTO: YELLOW
GLUCOSE UR STRIP-MCNC: NEGATIVE MG/DL
HGB UR QL STRIP: ABNORMAL
KETONES UR STRIP-MCNC: NEGATIVE MG/DL
LEUKOCYTE ESTERASE UR QL STRIP: NEGATIVE
NITRATE UR QL: POSITIVE
PH UR STRIP: 6 PH (ref 5–7)
RBC #/AREA URNS AUTO: ABNORMAL /HPF (ref 0–2)
SP GR UR STRIP: 1.01 (ref 1–1.03)
URN SPEC COLLECT METH UR: ABNORMAL
UROBILINOGEN UR STRIP-ACNC: 0.2 EU/DL (ref 0.2–1)
WBC #/AREA URNS AUTO: ABNORMAL /HPF (ref 0–2)

## 2017-03-29 PROCEDURE — 99213 OFFICE O/P EST LOW 20 MIN: CPT | Performed by: PHYSICIAN ASSISTANT

## 2017-03-29 PROCEDURE — 87086 URINE CULTURE/COLONY COUNT: CPT | Performed by: PHYSICIAN ASSISTANT

## 2017-03-29 PROCEDURE — 81001 URINALYSIS AUTO W/SCOPE: CPT | Performed by: PHYSICIAN ASSISTANT

## 2017-03-29 RX ORDER — NITROFURANTOIN 25; 75 MG/1; MG/1
100 CAPSULE ORAL 2 TIMES DAILY
Qty: 14 CAPSULE | Refills: 0 | Status: SHIPPED | OUTPATIENT
Start: 2017-03-29 | End: 2017-04-18

## 2017-03-29 RX ORDER — ALPRAZOLAM 0.5 MG
0.5 TABLET ORAL 3 TIMES DAILY PRN
Qty: 90 TABLET | Refills: 0 | Status: SHIPPED | OUTPATIENT
Start: 2017-03-29 | End: 2018-03-29

## 2017-03-29 ASSESSMENT — ANXIETY QUESTIONNAIRES
3. WORRYING TOO MUCH ABOUT DIFFERENT THINGS: NEARLY EVERY DAY
IF YOU CHECKED OFF ANY PROBLEMS ON THIS QUESTIONNAIRE, HOW DIFFICULT HAVE THESE PROBLEMS MADE IT FOR YOU TO DO YOUR WORK, TAKE CARE OF THINGS AT HOME, OR GET ALONG WITH OTHER PEOPLE: VERY DIFFICULT
6. BECOMING EASILY ANNOYED OR IRRITABLE: NOT AT ALL
7. FEELING AFRAID AS IF SOMETHING AWFUL MIGHT HAPPEN: NEARLY EVERY DAY
5. BEING SO RESTLESS THAT IT IS HARD TO SIT STILL: NOT AT ALL
GAD7 TOTAL SCORE: 15
1. FEELING NERVOUS, ANXIOUS, OR ON EDGE: NEARLY EVERY DAY
2. NOT BEING ABLE TO STOP OR CONTROL WORRYING: NEARLY EVERY DAY

## 2017-03-29 ASSESSMENT — PATIENT HEALTH QUESTIONNAIRE - PHQ9: 5. POOR APPETITE OR OVEREATING: NEARLY EVERY DAY

## 2017-03-29 ASSESSMENT — PAIN SCALES - GENERAL: PAINLEVEL: NO PAIN (0)

## 2017-03-29 NOTE — MR AVS SNAPSHOT
After Visit Summary   3/29/2017    Michaela Dorman    MRN: [id number]           Patient Information     Date Of Birth          1950        Visit Information        Provider Department      3/29/2017 10:00 AM Phani Tapia PA-C [facility]        Today's Diagnoses     Dysuria    -  1    Nonspecific finding on examination of urine        Anxiety        Acute cystitis without hematuria           Follow-ups after your visit        Your next 10 appointments already scheduled     Apr 11, 2017  3:00 PM CDT   [facility] Visit with Syeda Ferguson MD   [facility]    [address] [place] [address] [phone]            Apr 18, 2017 10:00 AM CDT   Level 2 with NL INFUSION CHAIR 2   [facility] ([facility])    [address] [place], [place] [address] [place] [address] [phone]              Who to contact     If you have questions or need follow up information about today's clinic visit or your schedule please contact [facility] directly at [phone].  Normal or non-critical lab and imaging results will be communicated to you by EchoSignhart, letter or phone within 4 business days after the clinic has received the results. If you do not hear from us within 7 days, please contact the clinic through ReadyForZerot or phone. If you have a critical or abnormal lab result, we will notify you by phone as soon as possible.  Submit refill requests through ClairMail or call your pharmacy and they will forward the refill request to us. Please allow 3 business days for your refill to be completed.          Additional Information About Your Visit        MyChart Information     ClairMail gives you secure access to your electronic health record. If you see a primary care provider, you can also send messages to your care team and make appointments. If you have questions, please call your primary care clinic.  If you do  not have a primary care provider, please call [phone] and they will assist you.        Care EveryWhere ID     This is your Care EveryWhere ID. This could be used by other organizations to access your [facility] medical records  [id number]        Your Vitals Were     Pulse Temperature Respirations Pulse Oximetry BMI (Body Mass Index)       80 97.9  F (36.6  C) (Oral) 16 97% 23.11 kg/m2        Blood Pressure from Last 3 Encounters:   03/29/17 136/80   03/28/17 140/68   03/21/17 141/85    Weight from Last 3 Encounters:   03/29/17 143 lb 4.8 oz (65 kg)   03/28/17 141 lb 11.2 oz (64.3 kg)   03/21/17 141 lb 9.6 oz (64.2 kg)              We Performed the Following     *UA reflex to Microscopic and Culture ([facility] (except [place] and [place])     Urine Culture Aerobic Bacterial     Urine Microscopic          Today's Medication Changes          These changes are accurate as of: 3/29/17 10:30 AM.  If you have any questions, ask your nurse or doctor.               Start taking these medicines.        Dose/Directions    ALPRAZolam 0.5 MG tablet   Commonly known as:  XANAX   Used for:  Anxiety   Started by:  Phani Tapia PA-C        Dose:  0.5 mg   Take 1 tablet (0.5 mg) by mouth 3 times daily as needed for anxiety   Quantity:  90 tablet   Refills:  0       nitrofurantoin (macrocrystal-monohydrate) 100 MG capsule   Commonly known as:  MACROBID   Used for:  Acute cystitis without hematuria   Started by:  Phani Tapia PA-C        Dose:  100 mg   Take 1 capsule (100 mg) by mouth 2 times daily   Quantity:  14 capsule   Refills:  0            Where to get your medicines      These medications were sent to [facility] [place], [place] - [address]   [address] [facility] [address]     Phone:  [phone]     nitrofurantoin (macrocrystal-monohydrate) 100 MG capsule         Some of these will need a paper prescription and others can be bought over the counter.  Ask your nurse if you have  questions.     Bring a paper prescription for each of these medications     ALPRAZolam 0.5 MG tablet                Primary Care Provider Office Phone # Fax #    Phani Tapia PA-C [phone]       [facility] [address] [facility] [address]        Thank you!     Thank you for choosing [facility]  for your care. Our goal is always to provide you with excellent care. Hearing back from our patients is one way we can continue to improve our services. Please take a few minutes to complete the written survey that you may receive in the mail after your visit with us. Thank you!             Your Updated Medication List - Protect others around you: Learn how to safely use, store and throw away your medicines at www.disposemymeds.org.          This list is accurate as of: 3/29/17 10:30 AM.  Always use your most recent med list.                   Brand Name Dispense Instructions for use    ALPRAZolam 0.5 MG tablet    XANAX    90 tablet    Take 1 tablet (0.5 mg) by mouth 3 times daily as needed for anxiety       FLUoxetine 20 MG capsule    PROzac    270 capsule    TAKE THREE CAPSULES BY MOUTH EVERY DAY TITRATE AS DIRECTED       hydrochlorothiazide 25 MG tablet    HYDRODIURIL    90 tablet    TAKE ONE TABLET BY MOUTH ONCE DAILY       LORazepam 0.5 MG tablet    ATIVAN    15 tablet    Take 1 tablet (0.5 mg) by mouth every 4 hours as needed (Anxiety, Nausea/Vomiting or Sleep)       nitrofurantoin (macrocrystal-monohydrate) 100 MG capsule    MACROBID    14 capsule    Take 1 capsule (100 mg) by mouth 2 times daily       priLOSEC 20 MG CR capsule   Generic drug:  omeprazole      Take by mouth daily       simvastatin 20 MG tablet    ZOCOR    90 tablet    Take 1 tablet (20 mg) by mouth At Bedtime       TYLENOL EX ST ARTHRITIS PAIN 500 MG tablet   Generic drug:  acetaminophen      Take 500-1,000 mg by mouth every 6 hours as needed for mild pain       venlafaxine 37.5 MG 24 hr capsule    EFFEXOR  XR    30 capsule    Take 1 capsule (37.5 mg) by mouth daily

## 2017-03-29 NOTE — PROGRESS NOTES
SUBJECTIVE:                                                    Michaela Dorman is a 66 year old female who presents to clinic today for the following health issues:      Anxiety Follow-Up    Status since last visit: Improved with ativan but formulary issues are noted - discussed alternative.    Other associated symptoms:None    Complicating factors:   Significant life event: Yes-  Breast cancer and her  having a horrific car accident and currently recovering in the nursing home.   Current substance abuse: None  Depression symptoms: Yes-  Mild but manageable  AUSTIN-7 SCORE 8/31/2016 3/29/2017   Total Score 0 15        GAD7       Amount of exercise or physical activity:    Problems taking medications regularly: No    Medication side effects: no muscle aches    Diet: regular (no restrictions)    URINARY TRACT SYMPTOMS     Onset: 2 1/2 weeks    Description:   Painful urination (Dysuria): YES  Blood in urine (Hematuria): no   Delay in urine (Hesitency): YES    Intensity: mild    Progression of Symptoms:  same    Accompanying Signs & Symptoms:  Fever/chills: no   Flank pain no   Nausea and vomiting: no   Any vaginal symptoms: none  Abdominal/Pelvic Pain: no    History:   History of frequent UTI's: YES- recently treated but no resolution of S/S.  Culture negative but I can not find final results.  History of kidney stones: no   Sexually Active: no   Possibility of pregnancy: No    Precipitating factors:   UNK         Therapies Tried and outcome:     Problem list and histories reviewed & adjusted, as indicated.  Additional history: as documented    Patient Active Problem List   Diagnosis     Enthesopathy of hip region     Family history of stroke (cerebrovascular)     Trochanteric bursitis     Advanced directives, counseling/discussion     Health Care Home     Baker's cyst of knee     Patella-femoral syndrome     Adjustment disorder with depressed mood     Essential hypertension     Malignant neoplasm of  upper-outer quadrant of left female breast (H)     Encounter for antineoplastic chemotherapy     S/P bilateral mastectomy     Anxiety     Past Surgical History:   Procedure Laterality Date     ARTHROSCOPY KNEE  6/7/2012    Procedure:ARTHROSCOPY KNEE; right knee arthroscopy with partial lateral menisectomy; Surgeon:DAMIÁN MCALLISTER; Location:[facility]     C LIGATE FALLOPIAN TUBE       C NONSPECIFIC PROCEDURE  1956    ankle fracture surgery     EXCISE MASS AXILLA Left 9/16/2016    Procedure: EXCISE MASS AXILLA;  Surgeon: Keyon Blanco MD;  Location: [facility] OR     HC REMV CATARACT EXTRACAP,INSERT LENS  6/25/2009    Right eye     INSERT PORT VASCULAR ACCESS Right 10/7/2016    Procedure: INSERT PORT VASCULAR ACCESS;  Surgeon: Keyon Blanco MD;  Location[facility]     MASTECTOMY SIMPLE BILATERAL Bilateral 2/8/2017    Procedure: MASTECTOMY SIMPLE BILATERAL;  Surgeon: Keyon Blanco MD;  Location: [facility]     OPEN REDUCTION INTERNAL FIXATION FOOT Right 3/20/2015    Procedure: OPEN REDUCTION INTERNAL FIXATION FOOT;  Surgeon: Javi Herrmann DPM;  Location: [facility]     SHOULDER SURGERY Right 11/2015       Social History   Substance Use Topics     Smoking status: Former Smoker     Packs/day: 3.00     Years: 10.00     Types: Cigarettes     Quit date: 12/1/1979     Smokeless tobacco: Never Used      Comment: approx. 3 packs daily     Alcohol use 0.0 oz/week     0 Standard drinks or equivalent per week      Comment: daily glass of wine     Family History   Problem Relation Age of Onset     Hypertension Mother      Thyroid Disease Mother      CEREBROVASCULAR DISEASE Mother      Hypertension Father      CEREBROVASCULAR DISEASE Father      CANCER Father      skin     Thyroid Disease Sister      DIABETES Brother      type 2     Depression Sister      Breast Cancer Sister      age 47     CANCER Sister      skin     CANCER Brother      inside nose           Reviewed and updated as needed this visit by clinical staff  Tobacco   Allergies  Med Hx  Surg Hx  Fam Hx  Soc Hx      Reviewed and updated as needed this visit by Provider         ROS:  Constitutional, HEENT, cardiovascular, pulmonary, gi and gu systems are negative, except as otherwise noted.    OBJECTIVE:                                                    /80 (BP Location: Right arm, Patient Position: Chair, Cuff Size: Adult Regular)  Pulse 80  Temp 97.9  F (36.6  C) (Oral)  Resp 16  Wt 143 lb 4.8 oz (65 kg)  SpO2 97%  BMI 23.11 kg/m2  Body mass index is 23.11 kg/(m^2).  GENERAL: healthy, alert and no distress  RESP: lungs clear to auscultation - no rales, rhonchi or wheezes  CV: regular rate and rhythm, normal S1 S2, no S3 or S4, no murmur, click or rub, no peripheral edema and peripheral pulses strong  ABDOMEN: soft, nontender, no hepatosplenomegaly, no masses and bowel sounds normal  MS: no gross musculoskeletal defects noted, no edema  NEURO: Normal strength and tone, mentation intact and speech normal  PSYCH: mentation appears normal, affect normal/bright    Diagnostic Test Results:  Results for orders placed or performed in visit on 03/29/17 (from the past 24 hour(s))   *UA reflex to Microscopic and Culture ([place] and [facility] (except [place] and [place])   Result Value Ref Range    Color Urine Yellow     Appearance Urine Clear     Glucose Urine Negative NEG mg/dL    Bilirubin Urine Negative NEG    Ketones Urine Negative NEG mg/dL    Specific Gravity Urine 1.015 1.003 - 1.035    Blood Urine Trace (A) NEG    pH Urine 6.0 5.0 - 7.0 pH    Protein Albumin Urine Negative NEG mg/dL    Urobilinogen Urine 0.2 0.2 - 1.0 EU/dL    Nitrite Urine Positive (A) NEG    Leukocyte Esterase Urine Negative NEG    Source Midstream Urine    Urine Microscopic   Result Value Ref Range    WBC Urine O - 2 0 - 2 /HPF    RBC Urine 2-5 (A) 0 - 2 /HPF    Bacteria Urine Few (A) NEG /HPF        ASSESSMENT/PLAN:                                                    1. Dysuria  Noted -  treat based on S/S as well as results  ROV PRN  - *UA reflex to Microscopic and Culture ([place] and [facility] (except [place] and [place])  - Urine Microscopic    2. Nonspecific finding on examination of urine  - Urine Culture Aerobic Bacterial    3. Anxiety  noted  - ALPRAZolam (XANAX) 0.5 MG tablet; Take 1 tablet (0.5 mg) by mouth 3 times daily as needed for anxiety  Dispense: 90 tablet; Refill: 0    4. Acute cystitis without hematuria  As above  - nitrofurantoin, macrocrystal-monohydrate, (MACROBID) 100 MG capsule; Take 1 capsule (100 mg) by mouth 2 times daily  Dispense: 14 capsule; Refill: 0  Phani Jason PA-C  [facility]

## 2017-03-29 NOTE — NURSING NOTE
"Chief Complaint   Patient presents with     UTI     pain and frequency for 2 1/2 weeks     Anxiety       Initial /80 (BP Location: Right arm, Patient Position: Chair, Cuff Size: Adult Regular)  Pulse 80  Temp 97.9  F (36.6  C) (Oral)  Resp 16  Wt 143 lb 4.8 oz (65 kg)  SpO2 97%  BMI 23.11 kg/m2 Estimated body mass index is 23.11 kg/(m^2) as calculated from the following:    Height as of 3/28/17: 5' 6.02\" (1.677 m).    Weight as of this encounter: 143 lb 4.8 oz (65 kg).  Medication Reconciliation: complete       Matilde MEANS LPN      "

## 2017-03-30 ASSESSMENT — ANXIETY QUESTIONNAIRES: GAD7 TOTAL SCORE: 15

## 2017-03-30 ASSESSMENT — PATIENT HEALTH QUESTIONNAIRE - PHQ9: SUM OF ALL RESPONSES TO PHQ QUESTIONS 1-9: 15

## 2017-03-31 LAB
BACTERIA SPEC CULT: NORMAL
MICRO REPORT STATUS: NORMAL
SPECIMEN SOURCE: NORMAL

## 2017-04-07 ENCOUNTER — TELEPHONE (OUTPATIENT)
Dept: FAMILY MEDICINE | Facility: CLINIC | Age: 67
End: 2017-04-07

## 2017-04-07 NOTE — TELEPHONE ENCOUNTER
Reason for Call:  Other call back    Detailed comments: she has an appt on tues 4/11 but would like to know if she should have a later appt because her chemo on monday 4/10. please call to advise.    Phone Number Patient can be reached at: Cell number on file:    Telephone Information:   Mobile [phone]       Best Time: any    Can we leave a detailed message on this number? YES    Call taken on 4/7/2017 at 3:37 PM by Manda Lamar

## 2017-04-07 NOTE — TELEPHONE ENCOUNTER
Returned call. Patient said she starts radiation Monday. Tuesday has radiation at 1040 am. Patient wondering if she needs to come to  Her 3:00 pm appt with Marty Tuesday since she will only have  Had 1 radiation at that point. Writer is unsure and will send this to Gautam Hernandez. Patient to call clinic if she hasn't heard from us by 11 am Tuesday.    Radha Mcclain RN  [facility]  [phone]  4/7/2017 4:43 PM

## 2017-04-10 ENCOUNTER — TRANSFERRED RECORDS (OUTPATIENT)
Dept: HEALTH INFORMATION MANAGEMENT | Facility: CLINIC | Age: 67
End: 2017-04-10

## 2017-04-11 NOTE — TELEPHONE ENCOUNTER
Reviewed with Dr. Ferguson and ok for patient to follow up prior to next infusion.   left for patient to return call.    Juan José Hernandez RN, BSN, OCN  4/11/2017, 10:42 AM

## 2017-04-11 NOTE — TELEPHONE ENCOUNTER
Spoke with patient and have her schedule next Tuesday with Marty and infusion. Patient verbalized understanding. Scheduled as follows below:  Britta Mims CMA

## 2017-04-17 ENCOUNTER — TELEPHONE (OUTPATIENT)
Dept: FAMILY MEDICINE | Facility: OTHER | Age: 67
End: 2017-04-17

## 2017-04-17 NOTE — TELEPHONE ENCOUNTER
Called and spoke to the patient. She said she was seen with Phani on 3/29/17 and diagnosed with a UTI. Patient was prescribed Macrobid for 7 days. Patient said she completed all 7 days and her symptoms seemed to have gone away.     Patient said yesterday she noticed a lot of her symptoms returned. She said she has burning with urination, painful urination, frequency, and pressure in her lower abdomin with urination. Patient denies fever or blood in the urine.     Patient is wondering if a covering provider would be able to send another antibiotic in for her? Or does she need to be seen? Patient said she could always drop off a urine if that is needed. She said she is pretty sure the same UTI is back.     Patient said she is headed into radiation right now so if she does not answer a message can be left.     Will route to Dr. Paniagua to advise.     Dotty Barriga RN  [facility]

## 2017-04-17 NOTE — TELEPHONE ENCOUNTER
Called and spoke to the patient. Informed her of the message below. Patient would like to follow up with Phani. She said Wednesday is the soonest she can come in as she has radiation tomorrow. Patient is scheduled Wednesday with Phani. She was informed about [facility] if she wants to be seen sooner. Patient understands and agrees with the plan of care.     Dotty Barriga RN  [facility]

## 2017-04-17 NOTE — TELEPHONE ENCOUNTER
Reason for call:  Patient reporting a symptom    Symptom or request: UTI     Duration (how long have symptoms been present): 2 days     Have you been treated for this before? Yes    Additional comments: Was treated for a UTI a few weeks ago and symptoms have returned     Phone Number patient can be reached at:  Home number on file [phone] (home)    Best Time:  Any     Can we leave a detailed message on this number:  YES    Call taken on 4/17/2017 at 9:31 AM by Britta Knowles

## 2017-04-17 NOTE — TELEPHONE ENCOUNTER
Last 2 urine cultures negative. Doubt UTI. She can use pyridium OTC for dysuria. She could leave UA or follow up with Phani Tapia tomorrow.  Electronically signed by Jean Paniagua MD

## 2017-04-18 ENCOUNTER — INFUSION THERAPY VISIT (OUTPATIENT)
Dept: INFUSION THERAPY | Facility: CLINIC | Age: 67
End: 2017-04-18
Attending: INTERNAL MEDICINE
Payer: MEDICARE

## 2017-04-18 ENCOUNTER — ONCOLOGY VISIT (OUTPATIENT)
Dept: ONCOLOGY | Facility: CLINIC | Age: 67
End: 2017-04-18
Payer: COMMERCIAL

## 2017-04-18 VITALS
HEART RATE: 75 BPM | OXYGEN SATURATION: 98 % | RESPIRATION RATE: 18 BRPM | TEMPERATURE: 98.2 F | DIASTOLIC BLOOD PRESSURE: 77 MMHG | BODY MASS INDEX: 22.81 KG/M2 | SYSTOLIC BLOOD PRESSURE: 149 MMHG | WEIGHT: 141.3 LBS

## 2017-04-18 VITALS
HEIGHT: 66 IN | HEART RATE: 107 BPM | OXYGEN SATURATION: 98 % | TEMPERATURE: 97.5 F | WEIGHT: 141.3 LBS | BODY MASS INDEX: 22.71 KG/M2 | RESPIRATION RATE: 16 BRPM | SYSTOLIC BLOOD PRESSURE: 128 MMHG | DIASTOLIC BLOOD PRESSURE: 74 MMHG

## 2017-04-18 DIAGNOSIS — Z51.11 ENCOUNTER FOR ANTINEOPLASTIC CHEMOTHERAPY: Primary | ICD-10-CM

## 2017-04-18 DIAGNOSIS — Z90.13 S/P BILATERAL MASTECTOMY: ICD-10-CM

## 2017-04-18 DIAGNOSIS — Z51.11 ENCOUNTER FOR ANTINEOPLASTIC CHEMOTHERAPY: ICD-10-CM

## 2017-04-18 DIAGNOSIS — C50.412 MALIGNANT NEOPLASM OF UPPER-OUTER QUADRANT OF LEFT FEMALE BREAST (H): ICD-10-CM

## 2017-04-18 DIAGNOSIS — C50.412 MALIGNANT NEOPLASM OF UPPER-OUTER QUADRANT OF LEFT FEMALE BREAST (H): Primary | ICD-10-CM

## 2017-04-18 PROCEDURE — 99214 OFFICE O/P EST MOD 30 MIN: CPT | Performed by: INTERNAL MEDICINE

## 2017-04-18 PROCEDURE — 96413 CHEMO IV INFUSION 1 HR: CPT

## 2017-04-18 PROCEDURE — 25000128 H RX IP 250 OP 636: Performed by: INTERNAL MEDICINE

## 2017-04-18 RX ORDER — HEPARIN SODIUM (PORCINE) LOCK FLUSH IV SOLN 100 UNIT/ML 100 UNIT/ML
500 SOLUTION INTRAVENOUS EVERY 8 HOURS
Status: CANCELLED
Start: 2017-04-18

## 2017-04-18 RX ORDER — HEPARIN SODIUM (PORCINE) LOCK FLUSH IV SOLN 100 UNIT/ML 100 UNIT/ML
500 SOLUTION INTRAVENOUS EVERY 8 HOURS
Status: DISCONTINUED | OUTPATIENT
Start: 2017-04-18 | End: 2017-04-18 | Stop reason: HOSPADM

## 2017-04-18 RX ADMIN — SODIUM CHLORIDE, PRESERVATIVE FREE 500 UNITS: 5 INJECTION INTRAVENOUS at 16:10

## 2017-04-18 RX ADMIN — TRASTUZUMAB 382 MG: KIT at 15:24

## 2017-04-18 RX ADMIN — SODIUM CHLORIDE 250 ML: 9 INJECTION, SOLUTION INTRAVENOUS at 15:24

## 2017-04-18 ASSESSMENT — PAIN SCALES - GENERAL
PAINLEVEL: NO PAIN (0)
PAINLEVEL: NO PAIN (0)

## 2017-04-18 NOTE — MR AVS SNAPSHOT
After Visit Summary   4/18/2017    Michaela Dorman    MRN: [id number]           Patient Information     Date Of Birth          1950        Visit Information        Provider Department      4/18/2017 2:30 PM NL INFUSION CHAIR 1 [facility]        Today's Diagnoses     Encounter for antineoplastic chemotherapy    -  1    Malignant neoplasm of upper-outer quadrant of left female breast (H)           Follow-ups after your visit        Follow-up notes from your care team     Return in about 3 weeks (around 5/9/2017).      Your next 10 appointments already scheduled     Apr 19, 2017  1:00 PM CDT   NM HEART MUGA REST with PHNM1   [facility] ([facility])    [address] [place] [address] [phone]           Please bring a list of your medicines to the exam. (Include vitamins, minerals and over-the-counter drugs.) You should wear comfortable clothes. Leave your valuables at home. Please bring related prior results and films.  Tell your doctor:   If you are breastfeeding or may be pregnant.   If you have had a barium test within the past few days. Barium may change the results of certain exams.   If you think you may need sedation (medicine to help you relax).  You may eat and drink as normal.  Please call your Imaging Department at your exam site with any questions.            Apr 19, 2017  4:20 PM CDT   Office Visit with Phani Tapia PA-C   [facility]    [address] [facility] [address] [phone]           Bring a current list of meds and any records pertaining to this visit.  For Physicals, please bring immunization records and any forms needing to be filled out.  Please arrive 10 minutes early to complete paperwork.            May 09, 2017  1:00 PM CDT   Level 2 with NL INFUSION CHAIR 1   [facility] ([facility])    [facility]  [address] [place] [address]   [phone]            May 30, 2017 10:30 AM CDT   [facility] Visit with [facility]eda [facility] MD   [facility]    [address] [place] [address]   [phone]            May 30, 2017 11:00 AM CDT   Level 2 with NL INFUSION CHAIR 2   [facility] ([facility])    [address] [place] [address]   [phone]              Future tests that were ordered for you today     Open Future Orders        Priority Expected Expires Ordered    Ca27.29  breast tumor marker Routine 5/30/2017 4/18/2018 4/18/2017    NM MUGA rest test Routine  4/18/2018 4/18/2017    CBC with platelets differential Routine 5/30/2017 4/18/2018 4/18/2017    Comprehensive metabolic panel Routine 5/30/2017 4/18/2018 4/18/2017            Who to contact     If you have questions or need follow up information about today's clinic visit or your schedule please contact [facility] directly at [phone].  Normal or non-critical lab and imaging results will be communicated to you by MyChart, letter or phone within 4 business days after the clinic has received the results. If you do not hear from us within 7 days, please contact the clinic through [employer] or phone. If you have a critical or abnormal lab result, we will notify you by phone as soon as possible.  Submit refill requests through [employer] or call your pharmacy and they will forward the refill request to us. Please allow 3 business days for your refill to be completed.          Additional Information About Your Visit        MyChart Information     [employer] gives you secure access to your electronic health record. If you see a primary care provider, you can also send messages to your care team and make appointments. If you have questions, please call your primary care clinic.  If you do not have a primary care provider, please call  [phone] and they will assist you.        Care EveryWhere ID     This is your Care EveryWhere ID. This could be used by other organizations to access your [place] medical records  [id number]        Your Vitals Were     Pulse Temperature Respirations Pulse Oximetry BMI (Body Mass Index)       75 98.2  F (36.8  C) (Temporal) 18 98% 22.81 kg/m2        Blood Pressure from Last 3 Encounters:   04/18/17 149/77   04/18/17 128/74   03/29/17 136/80    Weight from Last 3 Encounters:   04/18/17 64.1 kg (141 lb 4.8 oz)   04/18/17 64.1 kg (141 lb 4.8 oz)   03/29/17 65 kg (143 lb 4.8 oz)              We Performed the Following     Treatment Conditions        Primary Care Provider Office Phone # Fax #    Phani Tapia PA-C [phone]       [place] [address] [place] [address]        Thank you!     Thank you for choosing [facility]  for your care. Our goal is always to provide you with excellent care. Hearing back from our patients is one way we can continue to improve our services. Please take a few minutes to complete the written survey that you may receive in the mail after your visit with us. Thank you!             Your Updated Medication List - Protect others around you: Learn how to safely use, store and throw away your medicines at www.disposemymeds.org.          This list is accurate as of: 4/18/17  4:23 PM.  Always use your most recent med list.                   Brand Name Dispense Instructions for use    ALPRAZolam 0.5 MG tablet    XANAX    90 tablet    Take 1 tablet (0.5 mg) by mouth 3 times daily as needed for anxiety       FLUoxetine 20 MG capsule    PROzac    270 capsule    TAKE THREE CAPSULES BY MOUTH EVERY DAY TITRATE AS DIRECTED       hydrochlorothiazide 25 MG tablet    HYDRODIURIL    90 tablet    TAKE ONE TABLET BY MOUTH ONCE DAILY       LORazepam 0.5 MG tablet    ATIVAN    15 tablet    Take 1 tablet (0.5 mg) by mouth every 4 hours as needed  (Anxiety, Nausea/Vomiting or Sleep)       priLOSEC 20 MG CR capsule   Generic drug:  omeprazole      Take by mouth daily       simvastatin 20 MG tablet    ZOCOR    90 tablet    Take 1 tablet (20 mg) by mouth At Bedtime       TYLENOL EX ST ARTHRITIS PAIN 500 MG tablet   Generic drug:  acetaminophen      Take 500-1,000 mg by mouth every 6 hours as needed for mild pain       venlafaxine 37.5 MG 24 hr capsule    EFFEXOR XR    30 capsule    Take 1 capsule (37.5 mg) by mouth daily

## 2017-04-18 NOTE — NURSING NOTE
"Michaela Dorman is a 66 year old female who presents for:  Chief Complaint   Patient presents with     Oncology Clinic Visit     6 week follow up for breast cancer     Chemotherapy     Herceptin today      Radiation Therapy     Current in [facility]        Initial Vitals:  /74 (BP Location: Right arm, Patient Position: Chair, Cuff Size: Adult Regular)  Pulse 107  Temp 97.5  F (36.4  C) (Temporal)  Resp 16  Ht 1.676 m (5' 6\")  Wt 64.1 kg (141 lb 4.8 oz)  SpO2 98%  BMI 22.81 kg/m2 Estimated body mass index is 22.81 kg/(m^2) as calculated from the following:    Height as of this encounter: 1.676 m (5' 6\").    Weight as of this encounter: 64.1 kg (141 lb 4.8 oz).. Body surface area is 1.73 meters squared. BP completed using cuff size: regular  No Pain (0) No LMP recorded. Patient is postmenopausal. Allergies and medications reviewed.     Medications: Medication refills not needed today.  Pharmacy name entered into EPIC:    [facility] [address] [place] [address] - [address]  [facility] [place], [place] - [address]  [facility] [place], [place] - [address]     Comments:       Britta Mims MA        "

## 2017-04-18 NOTE — PROGRESS NOTES
Hematology/ Oncology Follow-up Visit:  Apr 18, 2017    Reason for Visit:   Chief Complaint   Patient presents with     Oncology Clinic Visit     6 week follow up for breast cancer     Chemotherapy     Herceptin today      Radiation Therapy     Current in [facility]       Oncologic History:  Malignant neoplasm of upper-outer quadrant of left female breast (H)  Michaela Dorman  initially felt a mass in her left axilla. She then underwent mammogram which showed dense breasts with no suspicious lesions in 4/2016. She was then seen by surgery and underwent excisional LN biopsy that showed metastatic high-grade poorly differentiated carcinoma with a tumor size of 2.5 cm. Immunohistochemistry was done for ER/NC receptors that came back both negative. HER-2/boubacar was amplified by FISH. She then underwent MRI of the breast on 9/26/2016 that showed suspicious abnormality with multicentric left sided breast cancer that involved the left lateral breast form the nipple to the chest wall. A PET scan was obtained on 10/5/2016 that showed a hypermetabolic opacity in the left axilla wihtout other pathological activity. It was then recommended that she undergo neoadjuvant chemotherapy with Cytoxan and Doxorubicin that will be followed by Taxol, Herceptin. Patient initiated treatment on 10/11/2016.       Interval History:  Patient is here today for follow-up. She started radiation therapy. She has been tolerating radiation therapy without significant side effects. She also on Herceptin currently without significant side effects. She denies any recent complaints of weight loss or night sweats or fever or chills. She denies any shortness of breath or cough or wheezing.     Review Of Systems:  Constitutional: Negative for fever, chills, and night sweats.  Skin: negative.  Eyes: negative.  Ears/Nose/Throat: negative.  Respiratory: No shortness of breath, dyspnea on exertion, cough, or hemoptysis.  Cardiovascular:  "negative.  Gastrointestinal: negative.  Genitourinary: negative.  Musculoskeletal: negative.  Neurologic: negative.  Psychiatric: negative.  Hematologic/Lymphatic/Immunologic: negative.  Endocrine: negative.    All other ROS negative unless mentioned in interval history.    Past medical, social, surgical, and family histories reviewed.    Allergies:  Allergies as of 04/18/2017 - Oskar as Reviewed 04/18/2017   Allergen Reaction Noted     No known drug allergies  07/05/2002       Current Medications:  Current Outpatient Prescriptions   Medication Sig Dispense Refill     ALPRAZolam (XANAX) 0.5 MG tablet Take 1 tablet (0.5 mg) by mouth 3 times daily as needed for anxiety 90 tablet 0     nitrofurantoin, macrocrystal-monohydrate, (MACROBID) 100 MG capsule Take 1 capsule (100 mg) by mouth 2 times daily 14 capsule 0     LORazepam (ATIVAN) 0.5 MG tablet Take 1 tablet (0.5 mg) by mouth every 4 hours as needed (Anxiety, Nausea/Vomiting or Sleep) 15 tablet 0     FLUoxetine (PROZAC) 20 MG capsule TAKE THREE CAPSULES BY MOUTH EVERY DAY TITRATE AS DIRECTED 270 capsule 0     venlafaxine (EFFEXOR XR) 37.5 MG 24 hr capsule Take 1 capsule (37.5 mg) by mouth daily 30 capsule 1     hydrochlorothiazide (HYDRODIURIL) 25 MG tablet TAKE ONE TABLET BY MOUTH ONCE DAILY 90 tablet 1     simvastatin (ZOCOR) 20 MG tablet Take 1 tablet (20 mg) by mouth At Bedtime 90 tablet 1     acetaminophen (TYLENOL EX ST ARTHRITIS PAIN) 500 MG tablet Take 500-1,000 mg by mouth every 6 hours as needed for mild pain       omeprazole (PRILOSEC) 20 MG capsule Take by mouth daily          Physical Exam:  /74 (BP Location: Right arm, Patient Position: Chair, Cuff Size: Adult Regular)  Pulse 107  Temp 97.5  F (36.4  C) (Temporal)  Resp 16  Ht 1.676 m (5' 6\")  Wt 64.1 kg (141 lb 4.8 oz)  SpO2 98%  BMI 22.81 kg/m2  Wt Readings from Last 12 Encounters:   04/18/17 64.1 kg (141 lb 4.8 oz)   04/18/17 64.1 kg (141 lb 4.8 oz)   03/29/17 65 kg (143 lb 4.8 oz) "   03/28/17 64.3 kg (141 lb 11.2 oz)   03/21/17 64.2 kg (141 lb 9.6 oz)   03/07/17 63.8 kg (140 lb 9.6 oz)   02/28/17 63.2 kg (139 lb 4.8 oz)   02/16/17 64.3 kg (141 lb 12.8 oz)   02/14/17 63.7 kg (140 lb 8 oz)   02/08/17 64.5 kg (142 lb 3.2 oz)   01/24/17 63.6 kg (140 lb 4.8 oz)   01/24/17 63.8 kg (140 lb 9.6 oz)     ECOG performance status: 0  GENERAL APPEARANCE: Healthy, alert and in no acute distress.  HEENT: Sclerae anicteric. PERRLA. Oropharynx without ulcers, lesions, or thrush.  NECK: Supple. No asymmetry or masses.  LYMPHATICS: No palpable cervical, supraclavicular, axillary, or inguinal lymphadenopathy.  RESP: Lungs clear to auscultation bilaterally without rales, rhonchi or wheezes.  BREAST: Scars of bilateral mastectomies. CARDIOVASCULAR: Regular rate and rhythm. Normal S1, S2; no S3 or S4. No murmur, gallop, or rub.  ABDOMEN: Soft, nontender. Bowel sounds normal. No palpable organomegaly or masses.  MUSCULOSKELETAL: Extremities without gross deformities noted. No edema of bilateral lower extremities.  SKIN: No suspicious lesions or rashes.  NEURO: Alert and oriented x 3. Cranial nerves II-XII grossly intact.  PSYCHIATRIC: Mentation and affect appear normal.    Laboratory/Imaging Studies:  No visits with results within 2 Week(s) from this visit.  Latest known visit with results is:    Office Visit on 03/29/2017   Component Date Value Ref Range Status     Color Urine 03/29/2017 Yellow   Final     Appearance Urine 03/29/2017 Clear   Final     Glucose Urine 03/29/2017 Negative  NEG mg/dL Final     Bilirubin Urine 03/29/2017 Negative  NEG Final     Ketones Urine 03/29/2017 Negative  NEG mg/dL Final     Specific Gravity Urine 03/29/2017 1.015  1.003 - 1.035 Final     Blood Urine 03/29/2017 Trace* NEG Final     pH Urine 03/29/2017 6.0  5.0 - 7.0 pH Final     Protein Albumin Urine 03/29/2017 Negative  NEG mg/dL Final     Urobilinogen Urine 03/29/2017 0.2  0.2 - 1.0 EU/dL Final     Nitrite Urine 03/29/2017  Positive* NEG Final     Leukocyte Esterase Urine 03/29/2017 Negative  NEG Final     Source 03/29/2017 Midstream Urine   Final     WBC Urine 03/29/2017 O - 2  0 - 2 /HPF Final     RBC Urine 03/29/2017 2-5* 0 - 2 /HPF Final     Bacteria Urine 03/29/2017 Few* NEG /HPF Final     Specimen Description 03/29/2017 Midstream Urine   Final     Culture Micro 03/29/2017 <10,000 colonies/mL Mixed gram positive cher   Final     Micro Report Status 03/29/2017 FINAL 03/31/2017   Final          Assessment and plan:    (C50.412) Malignant neoplasm of upper-outer quadrant of left female breast (H)  (primary encounter diagnosis)  (Z90.13) S/P bilateral mastectomy  (Z51.11) Encounter for antineoplastic chemotherapy  Patient currently receiving radiation therapy. She's been tolerating treatment well. I would recommend to arrange for a MUGA scan. Patient will continue on Herceptin to conclude one year of adjuvant therapy. I will see the patient again in 6 weeks or sooner if there is new developments or concerns.    The patient is ready to learn, no apparent learning barriers were identified.  Diagnosis and treatment plans were explained to the patient. The patient expressed understanding of the content. The patient asked appropriate questions. The patient questions were answered to her satisfaction.    Chart documentation with Dragon Voice recognition Software. Although reviewed after completion, some words and grammatical errors may remain.

## 2017-04-18 NOTE — MR AVS SNAPSHOT
After Visit Summary   4/18/2017    Michaela Dorman    MRN: [id number]           Patient Information     Date Of Birth          1950        Visit Information        Provider Department      4/18/2017 2:00 PM Syeda Ferguson MD [facility]        Today's Diagnoses     Malignant neoplasm of upper-outer quadrant of left female breast (H)    -  1    S/P bilateral mastectomy        Encounter for antineoplastic chemotherapy          Care Instructions      Please follow up with Dr. Ferguson in 6 weeks.  Please come to Infusion 15-30 minutes prior to follow up for port labs.     An order for prosthetics as been placed and someone from [facility] will call to schedule.  If you don't hear anything in the next couple weeks please call Oncology.    MUGA Scan Date/Time:  4/19/17 at 1:00 - arrival of 12:45    Follow Up Date/Time: 5/30/17 at 10:30  Infusion Date/Time: 5/30/17 at 11:00    If you have any questions or concerns please feel free to call.    Juan José Hernandez RN, BSN   Oncology Care Coordinator RN  [facility]  [phone]            Follow-ups after your visit        Follow-up notes from your care team     Return in about 6 weeks (around 5/30/2017).      Your next 10 appointments already scheduled     Apr 19, 2017  1:00 PM CDT   NM HEART MUGA REST with PHNM1   [facility] Nuclear Medicine ([facility])    [address] [place] [id number] [phone]           Please bring a list of your medicines to the exam. (Include vitamins, minerals and over-the-counter drugs.) You should wear comfortable clothes. Leave your valuables at home. Please bring related prior results and films.  Tell your doctor:   If you are breastfeeding or may be pregnant.   If you have had a barium test within the past few days. Barium may change the results of certain exams.   If you think you may need sedation (medicine to help you relax).  You may eat and drink as  normal.  Please call your Imaging Department at your exam site with any questions.            Apr 19, 2017  4:20 PM CDT   Office Visit with Phani Tapia PA-C   [facility]    [address] [facility] [id number] [phone]           Bring a current list of meds and any records pertaining to this visit.  For Physicals, please bring immunization records and any forms needing to be filled out.  Please arrive 10 minutes early to complete paperwork.            May 30, 2017 10:30 AM CDT   Return Visit with Syeda Ferguson MD   [facility]    [address] [facility] [id number] [phone]            May 30, 2017 11:00 AM CDT   Level 2 with NL INFUSION CHAIR 2   [facility] ([facility])    [address] [place] [address]  [place] [id number] [phone]              Future tests that were ordered for you today     Open Future Orders        Priority Expected Expires Ordered    Ca27.29  breast tumor marker Routine 5/30/2017 4/18/2018 4/18/2017    NM MUGA rest test Routine  4/18/2018 4/18/2017    CBC with platelets differential Routine 5/30/2017 4/18/2018 4/18/2017    Comprehensive metabolic panel Routine 5/30/2017 4/18/2018 4/18/2017            Who to contact     If you have questions or need follow up information about today's clinic visit or your schedule please contact [facility] directly at [phone].  Normal or non-critical lab and imaging results will be communicated to you by MyChart, letter or phone within 4 business days after the clinic has received the results. If you do not hear from us within 7 days, please contact the clinic through MyChart or phone. If you have a critical or abnormal lab result, we will notify you by phone as soon as possible.  Submit refill requests through [employer] or call your pharmacy and they will forward the refill request to us.  "Please allow 3 business days for your refill to be completed.          Additional Information About Your Visit        MyChart Information     [employer] gives you secure access to your electronic health record. If you see a primary care provider, you can also send messages to your care team and make appointments. If you have questions, please call your primary care clinic.  If you do not have a primary care provider, please call [phone] and they will assist you.        Care EveryWhere ID     This is your Care EveryWhere ID. This could be used by other organizations to access your [facility] medical records  [id number]        Your Vitals Were     Pulse Temperature Respirations Height Pulse Oximetry BMI (Body Mass Index)    107 97.5  F (36.4  C) (Temporal) 16 1.676 m (5' 6\") 98% 22.81 kg/m2       Blood Pressure from Last 3 Encounters:   04/18/17 128/74   03/29/17 136/80   03/28/17 140/68    Weight from Last 3 Encounters:   04/18/17 64.1 kg (141 lb 4.8 oz)   03/29/17 65 kg (143 lb 4.8 oz)   03/28/17 64.3 kg (141 lb 11.2 oz)               Primary Care Provider Office Phone # Fax #    Phani Tapia PA-C [phone]       [facility] [address] [facility] [address]        Thank you!     Thank you for choosing [facility]  for your care. Our goal is always to provide you with excellent care. Hearing back from our patients is one way we can continue to improve our services. Please take a few minutes to complete the written survey that you may receive in the mail after your visit with us. Thank you!             Your Updated Medication List - Protect others around you: Learn how to safely use, store and throw away your medicines at www.disposemymeds.org.          This list is accurate as of: 4/18/17  2:36 PM.  Always use your most recent med list.                   Brand Name Dispense Instructions for use    ALPRAZolam 0.5 MG tablet    XANAX    90 tablet    Take 1 tablet (0.5 mg) " by mouth 3 times daily as needed for anxiety       FLUoxetine 20 MG capsule    PROzac    270 capsule    TAKE THREE CAPSULES BY MOUTH EVERY DAY TITRATE AS DIRECTED       hydrochlorothiazide 25 MG tablet    HYDRODIURIL    90 tablet    TAKE ONE TABLET BY MOUTH ONCE DAILY       LORazepam 0.5 MG tablet    ATIVAN    15 tablet    Take 1 tablet (0.5 mg) by mouth every 4 hours as needed (Anxiety, Nausea/Vomiting or Sleep)       nitrofurantoin (macrocrystal-monohydrate) 100 MG capsule    MACROBID    14 capsule    Take 1 capsule (100 mg) by mouth 2 times daily       priLOSEC 20 MG CR capsule   Generic drug:  omeprazole      Take by mouth daily       simvastatin 20 MG tablet    ZOCOR    90 tablet    Take 1 tablet (20 mg) by mouth At Bedtime       TYLENOL EX ST ARTHRITIS PAIN 500 MG tablet   Generic drug:  acetaminophen      Take 500-1,000 mg by mouth every 6 hours as needed for mild pain       venlafaxine 37.5 MG 24 hr capsule    EFFEXOR XR    30 capsule    Take 1 capsule (37.5 mg) by mouth daily

## 2017-04-18 NOTE — PATIENT INSTRUCTIONS
Please follow up with Dr. Ferguson in 6 weeks.  Please come to Infusion 15-30 minutes prior to follow up for port labs.     An order for prosthetics as been placed and someone from [place] will call to schedule.  If you don't hear anything in the next couple weeks please call Oncology.    MUGA Scan Date/Time:  4/19/17 at 1:00 - arrival of 12:45    Follow Up Date/Time: 5/30/17 at 10:30  Infusion Date/Time: 5/30/17 at 11:00    If you have any questions or concerns please feel free to call.    Juan José Hernandez, RN, BSN   Oncology Care Coordinator RN  [facility]  [phone]

## 2017-04-18 NOTE — NURSING NOTE
DISCHARGE PLAN:  Next appointments: See patient instruction section  Departure Mode: Ambulatory  Accompanied by: sister  8 minutes for nursing discharge (face to face time)     Michaela Dorman is here today for Oncology follow up for Breast Cancer.  Writing nurse seen patient after Medical Oncology appointment to address questions/concerns/coordinate care. Patient in Radiation now.  Herceptin today.  Continue with Radiation and Herceptin plan.  MUGA due now.  Follow up in 6 weeks with labs. Appointments scheduled. See patient instructions and Oncologist's Progress note for further details. Questions and concerns addressed to patient's satisfaction. Patient verbalized and demonstrated understanding of plan.  Contact information provided and patient is encouraged to call with any that arise in the interim of care.    Juan José Hernandez, RN, BSN, OCN   Oncology Care Coordinator [facility]  [phone]  4/18/2017, 2:44 PM

## 2017-04-18 NOTE — PROGRESS NOTES
Patient here for Herceptin infusion after DR. Ferguson appointment in clinic. Tolerated infusion well. Positive blood return pre/post infusion. BSA/Dose verified by 2 RN's. Discharged in stable condition. Return in 3 weeks on 5/9/17.

## 2017-04-19 ENCOUNTER — OFFICE VISIT (OUTPATIENT)
Dept: FAMILY MEDICINE | Facility: OTHER | Age: 67
End: 2017-04-19
Payer: COMMERCIAL

## 2017-04-19 ENCOUNTER — HOSPITAL ENCOUNTER (OUTPATIENT)
Dept: NUCLEAR MEDICINE | Facility: CLINIC | Age: 67
Setting detail: NUCLEAR MEDICINE
Discharge: HOME | End: 2017-04-19
Attending: INTERNAL MEDICINE | Admitting: INTERNAL MEDICINE
Payer: MEDICARE

## 2017-04-19 VITALS
RESPIRATION RATE: 12 BRPM | BODY MASS INDEX: 22.92 KG/M2 | HEART RATE: 76 BPM | DIASTOLIC BLOOD PRESSURE: 60 MMHG | TEMPERATURE: 97.3 F | SYSTOLIC BLOOD PRESSURE: 134 MMHG | WEIGHT: 142 LBS

## 2017-04-19 DIAGNOSIS — R82.90 NONSPECIFIC FINDING ON EXAMINATION OF URINE: ICD-10-CM

## 2017-04-19 DIAGNOSIS — Z51.11 ENCOUNTER FOR ANTINEOPLASTIC CHEMOTHERAPY: ICD-10-CM

## 2017-04-19 DIAGNOSIS — N30.00 ACUTE CYSTITIS WITHOUT HEMATURIA: ICD-10-CM

## 2017-04-19 DIAGNOSIS — R30.0 DYSURIA: Primary | ICD-10-CM

## 2017-04-19 LAB
ALBUMIN UR-MCNC: NEGATIVE MG/DL
APPEARANCE UR: CLEAR
BACTERIA #/AREA URNS HPF: ABNORMAL /HPF
BILIRUB UR QL STRIP: NEGATIVE
COLOR UR AUTO: ABNORMAL
GLUCOSE UR STRIP-MCNC: 100 MG/DL
HGB UR QL STRIP: ABNORMAL
KETONES UR STRIP-MCNC: NEGATIVE MG/DL
LEUKOCYTE ESTERASE UR QL STRIP: NEGATIVE
NITRATE UR QL: POSITIVE
PH UR STRIP: 7 PH (ref 5–7)
RBC #/AREA URNS AUTO: ABNORMAL /HPF (ref 0–2)
SP GR UR STRIP: 1.01 (ref 1–1.03)
URN SPEC COLLECT METH UR: ABNORMAL
UROBILINOGEN UR STRIP-ACNC: 0.2 EU/DL (ref 0.2–1)
WBC #/AREA URNS AUTO: ABNORMAL /HPF (ref 0–2)

## 2017-04-19 PROCEDURE — 99213 OFFICE O/P EST LOW 20 MIN: CPT | Performed by: PHYSICIAN ASSISTANT

## 2017-04-19 PROCEDURE — 34300033 ZZH RX 343: Performed by: INTERNAL MEDICINE

## 2017-04-19 PROCEDURE — A9560 TC99M LABELED RBC: HCPCS | Performed by: INTERNAL MEDICINE

## 2017-04-19 PROCEDURE — 25000128 H RX IP 250 OP 636: Performed by: INTERNAL MEDICINE

## 2017-04-19 PROCEDURE — 78472 GATED HEART PLANAR SINGLE: CPT

## 2017-04-19 PROCEDURE — 87086 URINE CULTURE/COLONY COUNT: CPT | Performed by: PHYSICIAN ASSISTANT

## 2017-04-19 PROCEDURE — 81001 URINALYSIS AUTO W/SCOPE: CPT | Performed by: PHYSICIAN ASSISTANT

## 2017-04-19 RX ORDER — SULFAMETHOXAZOLE/TRIMETHOPRIM 800-160 MG
1 TABLET ORAL 2 TIMES DAILY
Qty: 40 TABLET | Refills: 1 | Status: SHIPPED | OUTPATIENT
Start: 2017-04-19 | End: 2017-05-09

## 2017-04-19 RX ORDER — HEPARIN SODIUM (PORCINE) LOCK FLUSH IV SOLN 100 UNIT/ML 100 UNIT/ML
1 SOLUTION INTRAVENOUS DAILY
Status: DISCONTINUED | OUTPATIENT
Start: 2017-04-19 | End: 2017-04-20 | Stop reason: HOSPADM

## 2017-04-19 RX ADMIN — SODIUM CHLORIDE, PRESERVATIVE FREE 1 UNITS: 5 INJECTION INTRAVENOUS at 12:32

## 2017-04-19 RX ADMIN — Medication 25.2 MILLICURIE: at 13:04

## 2017-04-19 ASSESSMENT — PAIN SCALES - GENERAL: PAINLEVEL: NO PAIN (0)

## 2017-04-19 NOTE — MR AVS SNAPSHOT
After Visit Summary   4/19/2017    Michaela Dorman    MRN: [id number]           Patient Information     Date Of Birth          1950        Visit Information        Provider Department      4/19/2017 4:20 PM Phani Tapia PA-C [facility]        Today's Diagnoses     Dysuria    -  1    Acute cystitis without hematuria           Follow-ups after your visit        Your next 10 appointments already scheduled     May 09, 2017  1:00 PM CDT   Level 2 with NL INFUSION CHAIR 1   [facility] ([facility])    [address] [place] [address] [place] [address]   [phone]            May 30, 2017 10:30 AM CDT   Return Visit with Syeda Ferguson MD   [facility]    [address] [place] [id number]   [phone]            May 30, 2017 11:00 AM CDT   Level 2 with NL INFUSION CHAIR 2   [facility] ([facility])    [address] [place] [address] [place] [address]   [phone]              Future tests that were ordered for you today     Open Future Orders        Priority Expected Expires Ordered    Ca27.29  breast tumor marker Routine 5/30/2017 4/18/2018 4/18/2017    NM MUGA rest test Routine  4/18/2018 4/18/2017    CBC with platelets differential Routine 5/30/2017 4/18/2018 4/18/2017    Comprehensive metabolic panel Routine 5/30/2017 4/18/2018 4/18/2017            Who to contact     If you have questions or need follow up information about today's clinic visit or your schedule please contact [facility] directly at [phone].  Normal or non-critical lab and imaging results will be communicated to you by MyChart, letter or phone within 4 business days after the clinic has received the results. If you do not hear from us within 7 days, please contact the clinic through MyChart or phone. If you have a critical or abnormal lab result, we will notify you by  phone as soon as possible.  Submit refill requests through [employer] or call your pharmacy and they will forward the refill request to us. Please allow 3 business days for your refill to be completed.          Additional Information About Your Visit        [employer] Information     [employer] gives you secure access to your electronic health record. If you see a primary care provider, you can also send messages to your care team and make appointments. If you have questions, please call your primary care clinic.  If you do not have a primary care provider, please call [phone] and they will assist you.        Care EveryWhere ID     This is your Care EveryWhere ID. This could be used by other organizations to access your [facility] medical records  [id number]        Your Vitals Were     Pulse Temperature Respirations BMI (Body Mass Index)          76 97.3  F (36.3  C) (Oral) 12 22.92 kg/m2         Blood Pressure from Last 3 Encounters:   04/19/17 134/60   04/18/17 149/77   04/18/17 128/74    Weight from Last 3 Encounters:   04/19/17 142 lb (64.4 kg)   04/18/17 141 lb 4.8 oz (64.1 kg)   04/18/17 141 lb 4.8 oz (64.1 kg)              We Performed the Following     *UA reflex to Microscopic and Culture ([facility] and [facility] (except [place] and [place])          Today's Medication Changes          These changes are accurate as of: 4/19/17  4:43 PM.  If you have any questions, ask your nurse or doctor.               Start taking these medicines.        Dose/Directions    sulfamethoxazole-trimethoprim 800-160 MG per tablet   Commonly known as:  BACTRIM DS/SEPTRA DS   Used for:  Acute cystitis without hematuria, Dysuria   Started by:  Phani Tapia PA-C        Dose:  1 tablet   Take 1 tablet by mouth 2 times daily For 10 days and then daily.   Quantity:  40 tablet   Refills:  1            Where to get your medicines      These medications were sent to [facility] [place], [place] - [address]  [address]  , [facility] [address]     Phone:  [phone]     sulfamethoxazole-trimethoprim 800-160 MG per tablet                Primary Care Provider Office Phone # Fax #    Phani Tapia PA-C [phone]       [facility] [address] [facility] [address]        Thank you!     Thank you for choosing [facility]  for your care. Our goal is always to provide you with excellent care. Hearing back from our patients is one way we can continue to improve our services. Please take a few minutes to complete the written survey that you may receive in the mail after your visit with us. Thank you!             Your Updated Medication List - Protect others around you: Learn how to safely use, store and throw away your medicines at www.disposemymeds.org.          This list is accurate as of: 4/19/17  4:43 PM.  Always use your most recent med list.                   Brand Name Dispense Instructions for use    ALPRAZolam 0.5 MG tablet    XANAX    90 tablet    Take 1 tablet (0.5 mg) by mouth 3 times daily as needed for anxiety       FLUoxetine 20 MG capsule    PROzac    270 capsule    TAKE THREE CAPSULES BY MOUTH EVERY DAY TITRATE AS DIRECTED       hydrochlorothiazide 25 MG tablet    HYDRODIURIL    90 tablet    TAKE ONE TABLET BY MOUTH ONCE DAILY       LORazepam 0.5 MG tablet    ATIVAN    15 tablet    Take 1 tablet (0.5 mg) by mouth every 4 hours as needed (Anxiety, Nausea/Vomiting or Sleep)       priLOSEC 20 MG CR capsule   Generic drug:  omeprazole      Take by mouth daily       simvastatin 20 MG tablet    ZOCOR    90 tablet    Take 1 tablet (20 mg) by mouth At Bedtime       sulfamethoxazole-trimethoprim 800-160 MG per tablet    BACTRIM DS/SEPTRA DS    40 tablet    Take 1 tablet by mouth 2 times daily For 10 days and then daily.       TYLENOL EX ST ARTHRITIS PAIN 500 MG tablet   Generic drug:  acetaminophen      Take 500-1,000 mg by mouth every 6 hours as needed for mild pain       venlafaxine 37.5 MG 24  hr capsule    EFFEXOR XR    30 capsule    Take 1 capsule (37.5 mg) by mouth daily

## 2017-04-19 NOTE — NURSING NOTE
"Chief Complaint   Patient presents with     UTI     4 days       Initial /60 (BP Location: Right arm, Patient Position: Chair, Cuff Size: Adult Regular)  Pulse 76  Temp 97.3  F (36.3  C) (Oral)  Resp 12  Wt 142 lb (64.4 kg)  BMI 22.92 kg/m2 Estimated body mass index is 22.92 kg/(m^2) as calculated from the following:    Height as of 4/18/17: 5' 6\" (1.676 m).    Weight as of this encounter: 142 lb (64.4 kg).  Medication Reconciliation: complete    "

## 2017-04-19 NOTE — PROGRESS NOTES
SUBJECTIVE:                                                    Michaela Dorman is a 66 year old female who presents to clinic today for the following health issues:      URINARY TRACT SYMPTOMS     Onset: 4 days    Description:   Painful urination (Dysuria): YES  Blood in urine (Hematuria): no   Delay in urine (Hesitency): YES    Intensity: mild    Progression of Symptoms:  worsening    Accompanying Signs & Symptoms:  Fever/chills: no   Flank pain no   Nausea and vomiting: no   Any vaginal symptoms: none  Abdominal/Pelvic Pain: no    History:   History of frequent UTI's: YES- since beginning chemo and radiation  History of kidney stones: no   Sexually Active: no   Possibility of pregnancy: No    Precipitating factors:   As above         Therapies Tried and outcome: Pyridium     Problem list and histories reviewed & adjusted, as indicated.  Additional history: as documented    Patient Active Problem List   Diagnosis     Enthesopathy of hip region     Family history of stroke (cerebrovascular)     Trochanteric bursitis     Advanced directives, counseling/discussion     Health Care Home     Baker's cyst of knee     Patella-femoral syndrome     Adjustment disorder with depressed mood     Essential hypertension     Malignant neoplasm of upper-outer quadrant of left female breast (H)     Encounter for antineoplastic chemotherapy     S/P bilateral mastectomy     Anxiety     Past Surgical History:   Procedure Laterality Date     ARTHROSCOPY KNEE  6/7/2012    Procedure:ARTHROSCOPY KNEE; right knee arthroscopy with partial lateral menisectomy; Surgeon:DAMIÁN MCALLISTER; Location:[facility] OR     C LIGATE FALLOPIAN TUBE       C NONSPECIFIC PROCEDURE  1956    ankle fracture surgery     EXCISE MASS AXILLA Left 9/16/2016    Procedure: EXCISE MASS AXILLA;  Surgeon: Keyon Blanco MD;  Location: [facility] OR     HC REMV CATARACT EXTRACAP,INSERT LENS  6/25/2009    Right eye     INSERT PORT VASCULAR ACCESS Right 10/7/2016    Procedure:  INSERT PORT VASCULAR ACCESS;  Surgeon: Keyon Blanco MD;  Location: [facility]     MASTECTOMY SIMPLE BILATERAL Bilateral 2/8/2017    Procedure: MASTECTOMY SIMPLE BILATERAL;  Surgeon: Keyon Blanco MD;  Location: [facility]     OPEN REDUCTION INTERNAL FIXATION FOOT Right 3/20/2015    Procedure: OPEN REDUCTION INTERNAL FIXATION FOOT;  Surgeon: Javi Herrmann DPM;  Location: [facility]     SHOULDER SURGERY Right 11/2015       Social History   Substance Use Topics     Smoking status: Former Smoker     Packs/day: 3.00     Years: 10.00     Types: Cigarettes     Quit date: 12/1/1979     Smokeless tobacco: Never Used      Comment: approx. 3 packs daily     Alcohol use 0.0 oz/week     0 Standard drinks or equivalent per week      Comment: daily glass of wine     Family History   Problem Relation Age of Onset     Hypertension Mother      Thyroid Disease Mother      CEREBROVASCULAR DISEASE Mother      Hypertension Father      CEREBROVASCULAR DISEASE Father      CANCER Father      skin     Thyroid Disease Sister      DIABETES Brother      type 2     Depression Sister      Breast Cancer Sister      age 47     CANCER Sister      skin     CANCER Brother      inside nose           Reviewed and updated as needed this visit by clinical staff  Tobacco  Allergies  Med Hx  Surg Hx  Fam Hx  Soc Hx      Reviewed and updated as needed this visit by Provider         ROS:  Constitutional, HEENT, cardiovascular, pulmonary, gi and gu systems are negative, except as otherwise noted.    OBJECTIVE:                                                    /60 (BP Location: Right arm, Patient Position: Chair, Cuff Size: Adult Regular)  Pulse 76  Temp 97.3  F (36.3  C) (Oral)  Resp 12  Wt 142 lb (64.4 kg)  BMI 22.92 kg/m2  Body mass index is 22.92 kg/(m^2).  GENERAL: healthy, alert and no distress  RESP: lungs clear to auscultation - no rales, rhonchi or wheezes  CV: regular rate and rhythm, normal S1 S2, no S3 or S4, no murmur, click or  rub, no peripheral edema and peripheral pulses strong  MS: no gross musculoskeletal defects noted, no edema  PSYCH: mentation appears normal, affect normal/bright    Diagnostic Test Results:  Results for orders placed or performed in visit on 04/19/17 (from the past 24 hour(s))   *UA reflex to Microscopic and Culture ([facility] (except [place] and [place])   Result Value Ref Range    Color Urine Orange     Appearance Urine Clear     Glucose Urine 100 (A) NEG mg/dL    Bilirubin Urine Negative NEG    Ketones Urine Negative NEG mg/dL    Specific Gravity Urine 1.015 1.003 - 1.035    Blood Urine Trace (A) NEG    pH Urine 7.0 5.0 - 7.0 pH    Protein Albumin Urine Negative NEG mg/dL    Urobilinogen Urine 0.2 0.2 - 1.0 EU/dL    Nitrite Urine Positive (A) NEG    Leukocyte Esterase Urine Negative NEG    Source Midstream Urine         ASSESSMENT/PLAN:                                                    1. Dysuria  2. Acute cystitis without hematuria  - *UA reflex to Microscopic and Culture ([facility] (except [place] and [place])  - sulfamethoxazole-trimethoprim (BACTRIM DS/SEPTRA DS) 800-160 MG per tablet; Take 1 tablet by mouth 2 times daily For 10 days and then daily.  Dispense: 40 tablet; Refill: 1  - Urine Microscopic  ROV 2 weeks to recheck urine    Phani Jason PA-C  [facility]

## 2017-04-21 LAB
BACTERIA SPEC CULT: NO GROWTH
MICRO REPORT STATUS: NORMAL
SPECIMEN SOURCE: NORMAL

## 2017-04-24 ENCOUNTER — TELEPHONE (OUTPATIENT)
Dept: FAMILY MEDICINE | Facility: OTHER | Age: 67
End: 2017-04-24

## 2017-04-24 DIAGNOSIS — R30.0 DYSURIA: Primary | ICD-10-CM

## 2017-04-24 DIAGNOSIS — N30.00 ACUTE CYSTITIS WITHOUT HEMATURIA: Primary | ICD-10-CM

## 2017-04-24 RX ORDER — OXYBUTYNIN CHLORIDE 5 MG/1
5 TABLET, EXTENDED RELEASE ORAL DAILY
Qty: 90 TABLET | Refills: 1 | Status: SHIPPED | OUTPATIENT
Start: 2017-04-24 | End: 2017-04-24

## 2017-04-24 RX ORDER — OXYBUTYNIN CHLORIDE 5 MG/1
2.5 TABLET ORAL 2 TIMES DAILY
Qty: 60 TABLET | Refills: 0 | Status: SHIPPED | OUTPATIENT
Start: 2017-04-24 | End: 2017-06-24

## 2017-04-24 RX ORDER — CIPROFLOXACIN 500 MG/1
500 TABLET, FILM COATED ORAL 2 TIMES DAILY
Qty: 14 TABLET | Refills: 0 | Status: SHIPPED | OUTPATIENT
Start: 2017-04-24 | End: 2017-05-09

## 2017-04-24 NOTE — TELEPHONE ENCOUNTER
Reason for Call:  Other call back    Detailed comments: Patient was seen for a UTI last Wednesday. It has gotten much worse. Would like a different medication for this    Phone Number Patient can be reached at: Home number on file [phone] (home)    Best Time: any    Can we leave a detailed message on this number? YES    Call taken on 4/24/2017 at 7:42 AM by Uzma Paniagua

## 2017-04-24 NOTE — TELEPHONE ENCOUNTER
Sent some Ciprofloxacin (antibiotic) to the [facility] here in town as well as some ditropan (helps the bladder empty better and decrease bladder spasm a little) as well.  Please let her know.  Electronically signed:    Phani Jason PA-C

## 2017-05-07 DIAGNOSIS — F43.21 ADJUSTMENT DISORDER WITH DEPRESSED MOOD: ICD-10-CM

## 2017-05-07 RX ORDER — VENLAFAXINE HYDROCHLORIDE 37.5 MG/1
37.5 CAPSULE, EXTENDED RELEASE ORAL DAILY
Qty: 90 CAPSULE | Refills: 1 | Status: SHIPPED | OUTPATIENT
Start: 2017-05-07 | End: 2017-09-25

## 2017-05-07 NOTE — TELEPHONE ENCOUNTER
Venlafaxine ER     Last Written Prescription Date: 3/2/17  Last Fill Quantity: 30, # refills: 1  Last Office Visit with [facility] primary care provider:  4/19/17   Next 5 appointments (look out 90 days)     May 09, 2017  4:15 PM CDT   Office Visit with Anish To MD   [facility]    [address] [facility] [id number] [phone]            May 30, 2017 10:30 AM CDT   Return Visit with Syeda Ferguson MD   [facility]    [address] [place] [id number] [phone]                   Last PHQ-9 score on record=   PHQ-9 SCORE 3/29/2017   Total Score -   Total Score 15

## 2017-05-09 ENCOUNTER — OFFICE VISIT (OUTPATIENT)
Dept: FAMILY MEDICINE | Facility: OTHER | Age: 67
End: 2017-05-09
Payer: COMMERCIAL

## 2017-05-09 ENCOUNTER — INFUSION THERAPY VISIT (OUTPATIENT)
Dept: INFUSION THERAPY | Facility: CLINIC | Age: 67
End: 2017-05-09
Attending: INTERNAL MEDICINE
Payer: MEDICARE

## 2017-05-09 VITALS
HEIGHT: 66 IN | DIASTOLIC BLOOD PRESSURE: 66 MMHG | HEART RATE: 80 BPM | WEIGHT: 140 LBS | SYSTOLIC BLOOD PRESSURE: 137 MMHG | RESPIRATION RATE: 16 BRPM | BODY MASS INDEX: 22.5 KG/M2 | TEMPERATURE: 98.2 F

## 2017-05-09 VITALS
DIASTOLIC BLOOD PRESSURE: 66 MMHG | RESPIRATION RATE: 16 BRPM | TEMPERATURE: 98.2 F | BODY MASS INDEX: 22.5 KG/M2 | OXYGEN SATURATION: 98 % | HEART RATE: 80 BPM | HEIGHT: 66 IN | SYSTOLIC BLOOD PRESSURE: 137 MMHG | WEIGHT: 140 LBS

## 2017-05-09 DIAGNOSIS — Z90.13 S/P BILATERAL MASTECTOMY: Primary | ICD-10-CM

## 2017-05-09 DIAGNOSIS — C50.412 MALIGNANT NEOPLASM OF UPPER-OUTER QUADRANT OF LEFT FEMALE BREAST (H): ICD-10-CM

## 2017-05-09 DIAGNOSIS — Z51.11 ENCOUNTER FOR ANTINEOPLASTIC CHEMOTHERAPY: Primary | ICD-10-CM

## 2017-05-09 DIAGNOSIS — M76.892 ENTHESOPATHY OF LEFT HIP REGION: Primary | ICD-10-CM

## 2017-05-09 PROCEDURE — 99212 OFFICE O/P EST SF 10 MIN: CPT | Mod: 25 | Performed by: FAMILY MEDICINE

## 2017-05-09 PROCEDURE — 20610 DRAIN/INJ JOINT/BURSA W/O US: CPT | Performed by: FAMILY MEDICINE

## 2017-05-09 PROCEDURE — 96413 CHEMO IV INFUSION 1 HR: CPT

## 2017-05-09 PROCEDURE — 25000128 H RX IP 250 OP 636: Performed by: INTERNAL MEDICINE

## 2017-05-09 RX ORDER — SODIUM CHLORIDE 9 MG/ML
1000 INJECTION, SOLUTION INTRAVENOUS CONTINUOUS PRN
Status: CANCELLED
Start: 2017-05-09

## 2017-05-09 RX ORDER — ACETAMINOPHEN 325 MG/1
650 TABLET ORAL
Status: CANCELLED
Start: 2017-05-09

## 2017-05-09 RX ORDER — ALBUTEROL SULFATE 0.83 MG/ML
2.5 SOLUTION RESPIRATORY (INHALATION)
Status: CANCELLED | OUTPATIENT
Start: 2017-05-09

## 2017-05-09 RX ORDER — METHYLPREDNISOLONE SODIUM SUCCINATE 125 MG/2ML
125 INJECTION, POWDER, LYOPHILIZED, FOR SOLUTION INTRAMUSCULAR; INTRAVENOUS
Status: CANCELLED
Start: 2017-05-09

## 2017-05-09 RX ORDER — MEPERIDINE HYDROCHLORIDE 25 MG/ML
25 INJECTION INTRAMUSCULAR; INTRAVENOUS; SUBCUTANEOUS EVERY 30 MIN PRN
Status: CANCELLED | OUTPATIENT
Start: 2017-05-09

## 2017-05-09 RX ORDER — ALBUTEROL SULFATE 90 UG/1
1-2 AEROSOL, METERED RESPIRATORY (INHALATION)
Status: CANCELLED
Start: 2017-05-09

## 2017-05-09 RX ORDER — PROCHLORPERAZINE MALEATE 5 MG
10 TABLET ORAL EVERY 6 HOURS PRN
Status: CANCELLED
Start: 2017-05-09

## 2017-05-09 RX ORDER — DIPHENHYDRAMINE HYDROCHLORIDE 50 MG/ML
50 INJECTION INTRAMUSCULAR; INTRAVENOUS
Status: CANCELLED
Start: 2017-05-09

## 2017-05-09 RX ORDER — LORAZEPAM 2 MG/ML
.5-1 INJECTION INTRAMUSCULAR EVERY 6 HOURS PRN
Status: CANCELLED | OUTPATIENT
Start: 2017-05-09

## 2017-05-09 RX ORDER — LORAZEPAM 0.5 MG/1
.5-1 TABLET ORAL EVERY 6 HOURS PRN
Status: CANCELLED
Start: 2017-05-09

## 2017-05-09 RX ORDER — HEPARIN SODIUM (PORCINE) LOCK FLUSH IV SOLN 100 UNIT/ML 100 UNIT/ML
500 SOLUTION INTRAVENOUS EVERY 8 HOURS
Status: DISCONTINUED | OUTPATIENT
Start: 2017-05-09 | End: 2017-05-09 | Stop reason: HOSPADM

## 2017-05-09 RX ORDER — HEPARIN SODIUM (PORCINE) LOCK FLUSH IV SOLN 100 UNIT/ML 100 UNIT/ML
500 SOLUTION INTRAVENOUS EVERY 8 HOURS
Status: CANCELLED
Start: 2017-05-09

## 2017-05-09 RX ADMIN — TRASTUZUMAB 382 MG: KIT at 13:32

## 2017-05-09 RX ADMIN — SODIUM CHLORIDE, PRESERVATIVE FREE 500 UNITS: 5 INJECTION INTRAVENOUS at 14:31

## 2017-05-09 ASSESSMENT — PAIN SCALES - GENERAL
PAINLEVEL: SEVERE PAIN (7)
PAINLEVEL: SEVERE PAIN (6)

## 2017-05-09 NOTE — PATIENT INSTRUCTIONS
Pt to return on 5/30 for Herceptin. Copies of medication list and upcoming appointments given prior to discharge.

## 2017-05-09 NOTE — PROGRESS NOTES
"SUBJECTIVE:                                                    Michaela Dorman is a 66 year old female who presents to clinic today for the following health issues:    Chief Complaint   Patient presents with     Hip left     pt here requesting a left hip injection          Problem list and histories reviewed & adjusted, as indicated.    C: NEGATIVE for fever, chills, change in weight  E/M: NEGATIVE for ear, mouth and throat problems  R: NEGATIVE for significant cough or SOB  CV: NEGATIVE for chest pain, palpitations or peripheral edema  : on herceptin for breast cancer treated with mastectomy,irradiation, and chemotherapy  MUSCULOSKELETAL: left hip pain    OBJECTIVE:                                                    /66  Pulse 80  Temp 98.2  F (36.8  C)  Resp 16  Ht 5' 6\" (1.676 m)  Wt 140 lb (63.5 kg)  SpO2 98%  BMI 22.6 kg/m2  Body mass index is 22.6 kg/(m^2).    See dictated note   Michaela Dorman is a 66 year old female who presents with severe pain over the left trochanter and apparently has a trochanteric bursitis. The trigger point is located and under sterile technique the bursa is injected with 1 cc of 2% Polocaine and through the same needle, 80 mg of Depomedrol is instilled. The patient gets immediate relief of pain suggesting successful location of the injection.  Electronically signed by TRISTAN To M.D.  ASSESSMENT/PLAN:                                                    Trochanteric bursitis    Anish To MD, MD  [facility]          "

## 2017-05-09 NOTE — MR AVS SNAPSHOT
After Visit Summary   5/9/2017    Michaela Dorman    MRN: [id number]           Patient Information     Date Of Birth          1950        Visit Information        Provider Department      5/9/2017 1:00 PM NL INFUSION CHAIR 1 [facility]        Today's Diagnoses     Encounter for antineoplastic chemotherapy    -  1    Malignant neoplasm of upper-outer quadrant of left female breast (H)          Care Instructions    Pt to return on 5/30 for Herceptin. Copies of medication list and upcoming appointments given prior to discharge.         Follow-ups after your visit        Follow-up notes from your care team     Return in about 3 weeks (around 5/30/2017).      Your next 10 appointments already scheduled     May 30, 2017 10:30 AM CDT   [facility]   [facility]    [address] [place] [id number] [phone]            May 30, 2017 11:00 AM CDT   Level 2 with NL INFUSION CHAIR 2   [facility] ([address] [address] [phone]              Who to contact     If you have questions or need follow up information about today's clinic visit or your schedule please contact [facility] directly at [phone].  Normal or non-critical lab and imaging results will be communicated to you by Epiphanyhart, letter or phone within 4 business days after the clinic has received the results. If you do not hear from us within 7 days, please contact the clinic through Epiphanyhart or phone. If you have a critical or abnormal lab result, we will notify you by phone as soon as possible.  Submit refill requests through MusicSiren or call your pharmacy and they will forward the refill request to us. Please allow 3 business days for your refill to be completed.          Additional Information About Your Visit        [facility]  "Information     [employer] gives you secure access to your electronic health record. If you see a primary care provider, you can also send messages to your care team and make appointments. If you have questions, please call your primary care clinic.  If you do not have a primary care provider, please call [phone] and they will assist you.        Care EveryWhere ID     This is your Care EveryWhere ID. This could be used by other organizations to access your [facility] medical records  [id number]        Your Vitals Were     Pulse Temperature Respirations Height BMI (Body Mass Index)       80 98.2  F (36.8  C) (Temporal) 16 1.676 m (5' 6\") 22.6 kg/m2        Blood Pressure from Last 3 Encounters:   05/09/17 137/66   05/09/17 137/66   04/19/17 134/60    Weight from Last 3 Encounters:   05/09/17 63.5 kg (140 lb)   05/09/17 63.5 kg (140 lb)   04/19/17 64.4 kg (142 lb)              Today, you had the following     No orders found for display       Primary Care Provider Office Phone # Fax #    Phani Tapia PA-C [phone]       [place] [address]        Thank you!     Thank you for choosing [facility]  for your care. Our goal is always to provide you with excellent care. Hearing back from our patients is one way we can continue to improve our services. Please take a few minutes to complete the written survey that you may receive in the mail after your visit with us. Thank you!             Your Updated Medication List - Protect others around you: Learn how to safely use, store and throw away your medicines at www.disposemymeds.org.          This list is accurate as of: 5/9/17  4:27 PM.  Always use your most recent med list.                   Brand Name Dispense Instructions for use    ciprofloxacin 500 MG tablet    CIPRO    14 tablet    Take 1 tablet (500 mg) by mouth 2 times daily       FLUoxetine 20 MG capsule    PROzac    270 capsule    TAKE " THREE CAPSULES BY MOUTH EVERY DAY TITRATE AS DIRECTED       hydrochlorothiazide 25 MG tablet    HYDRODIURIL    90 tablet    TAKE ONE TABLET BY MOUTH ONCE DAILY       LORazepam 0.5 MG tablet    ATIVAN    15 tablet    Take 1 tablet (0.5 mg) by mouth every 4 hours as needed (Anxiety, Nausea/Vomiting or Sleep)       oxybutynin 5 MG tablet    DITROPAN    60 tablet    Take 0.5 tablets (2.5 mg) by mouth 2 times daily       priLOSEC 20 MG CR capsule   Generic drug:  omeprazole      Take by mouth daily       simvastatin 20 MG tablet    ZOCOR    90 tablet    Take 1 tablet (20 mg) by mouth At Bedtime       sulfamethoxazole-trimethoprim 800-160 MG per tablet    BACTRIM DS/SEPTRA DS    40 tablet    Take 1 tablet by mouth 2 times daily For 10 days and then daily.       TYLENOL EX ST ARTHRITIS PAIN 500 MG tablet   Generic drug:  acetaminophen      Take 500-1,000 mg by mouth every 6 hours as needed for mild pain       venlafaxine 37.5 MG 24 hr capsule    EFFEXOR XR    90 capsule    Take 1 capsule (37.5 mg) by mouth daily

## 2017-05-09 NOTE — PROGRESS NOTES
Patient here for Herceptin chemotherapy today. Labs/BSA/Dose verified by 2 RN'S.  Premeds given and tolerated chemotherapy well.  Positive blood return pre/post infusion. Discharged in stable condition.

## 2017-05-09 NOTE — MR AVS SNAPSHOT
After Visit Summary   5/9/2017    Michaela Dorman    MRN: [id number]           Patient Information     Date Of Birth          1950        Visit Information        Provider Department      5/9/2017 4:15 PM Anish To MD [facility]         Follow-ups after your visit        Your next 10 appointments already scheduled     May 30, 2017 10:30 AM CDT   Return Visit with Syeda Ferguson MD   [facility]    [address] [place] [id number] [phone]            May 30, 2017 11:00 AM CDT   Level 2 with NL INFUSION CHAIR 2   [facility] ([facility])    [address] [place] [id number] [phone]              Who to contact     If you have questions or need follow up information about today's clinic visit or your schedule please contact [facility] directly at [phone].  Normal or non-critical lab and imaging results will be communicated to you by Captricityhart, letter or phone within 4 business days after the clinic has received the results. If you do not hear from us within 7 days, please contact the clinic through Directed Edge or phone. If you have a critical or abnormal lab result, we will notify you by phone as soon as possible.  Submit refill requests through Directed Edge or call your pharmacy and they will forward the refill request to us. Please allow 3 business days for your refill to be completed.          Additional Information About Your Visit        [facility] Information     Directed Edge gives you secure access to your electronic health record. If you see a primary care provider, you can also send messages to your care team and make appointments. If you have questions, please call your primary care clinic.  If you do not have a primary care provider, please call [phone] and they will assist you.        Care EveryWhere ID     This is your Care EveryWhere  "ID. This could be used by other organizations to access your [facility] medical records  [id number]        Your Vitals Were     Pulse Temperature Respirations Height Pulse Oximetry BMI (Body Mass Index)    80 98.2  F (36.8  C) 16 5' 6\" (1.676 m) 98% 22.6 kg/m2       Blood Pressure from Last 3 Encounters:   05/09/17 137/66   05/09/17 137/66   04/19/17 134/60    Weight from Last 3 Encounters:   05/09/17 140 lb (63.5 kg)   05/09/17 140 lb (63.5 kg)   04/19/17 142 lb (64.4 kg)              Today, you had the following     No orders found for display       Primary Care Provider Office Phone # Fax #    Phani Tapia PA-C [phone]       [facility] [address] [facility] [address]        Thank you!     Thank you for choosing [facility]  for your care. Our goal is always to provide you with excellent care. Hearing back from our patients is one way we can continue to improve our services. Please take a few minutes to complete the written survey that you may receive in the mail after your visit with us. Thank you!             Your Updated Medication List - Protect others around you: Learn how to safely use, store and throw away your medicines at www.disposemymeds.org.          This list is accurate as of: 5/9/17  5:06 PM.  Always use your most recent med list.                   Brand Name Dispense Instructions for use    FLUoxetine 20 MG capsule    PROzac    270 capsule    TAKE THREE CAPSULES BY MOUTH EVERY DAY TITRATE AS DIRECTED       hydrochlorothiazide 25 MG tablet    HYDRODIURIL    90 tablet    TAKE ONE TABLET BY MOUTH ONCE DAILY       LORazepam 0.5 MG tablet    ATIVAN    15 tablet    Take 1 tablet (0.5 mg) by mouth every 4 hours as needed (Anxiety, Nausea/Vomiting or Sleep)       oxybutynin 5 MG tablet    DITROPAN    60 tablet    Take 0.5 tablets (2.5 mg) by mouth 2 times daily       priLOSEC 20 MG CR capsule   Generic drug:  omeprazole      Take by mouth daily       " simvastatin 20 MG tablet    ZOCOR    90 tablet    Take 1 tablet (20 mg) by mouth At Bedtime       TYLENOL EX ST ARTHRITIS PAIN 500 MG tablet   Generic drug:  acetaminophen      Take 500-1,000 mg by mouth every 6 hours as needed for mild pain       venlafaxine 37.5 MG 24 hr capsule    EFFEXOR XR    90 capsule    Take 1 capsule (37.5 mg) by mouth daily

## 2017-05-12 ENCOUNTER — TRANSFERRED RECORDS (OUTPATIENT)
Dept: HEALTH INFORMATION MANAGEMENT | Facility: CLINIC | Age: 67
End: 2017-05-12

## 2017-05-12 ENCOUNTER — TELEPHONE (OUTPATIENT)
Dept: FAMILY MEDICINE | Facility: OTHER | Age: 67
End: 2017-05-12

## 2017-05-12 DIAGNOSIS — N30.00 ACUTE CYSTITIS WITHOUT HEMATURIA: Primary | ICD-10-CM

## 2017-05-12 NOTE — TELEPHONE ENCOUNTER
Reason for Call:  Medication or medication refill:    Do you use a [facility]?  Name of the pharmacy and phone number for the current request:  [facility] - [phone]    Name of the medication requested: Patient states her UTI is back, was seen on 4.19, patient is requesting another provider to approve in Phani's absence, please advise    Other request:     Can we leave a detailed message on this number? YES    Phone number patient can be reached at: Home number on file [phone] (home)    Best Time: any    Call taken on 5/12/2017 at 8:36 AM by Gabriela Galloway

## 2017-05-14 RX ORDER — NITROFURANTOIN 25; 75 MG/1; MG/1
100 CAPSULE ORAL 2 TIMES DAILY
Qty: 14 CAPSULE | Refills: 0 | Status: SHIPPED | OUTPATIENT
Start: 2017-05-14 | End: 2017-08-02

## 2017-05-22 DIAGNOSIS — I10 ESSENTIAL HYPERTENSION: ICD-10-CM

## 2017-05-23 RX ORDER — HYDROCHLOROTHIAZIDE 25 MG/1
TABLET ORAL
Qty: 90 TABLET | Refills: 1 | Status: SHIPPED | OUTPATIENT
Start: 2017-05-23 | End: 2017-05-25

## 2017-05-23 NOTE — TELEPHONE ENCOUNTER
HCTZ      Last Written Prescription Date: 11/22/16  Last Fill Quantity: 90, # refills: 1  Last Office Visit with G, P or [facility] prescribing provider: 5/9/17  Next 5 appointments (look out 90 days)     May 30, 2017 10:30 AM CDT   Return Visit with Syeda Ferguson MD   [facility]    [address] [place] [id number] [phone]                   Potassium   Date Value Ref Range Status   01/24/2017 3.9 3.4 - 5.3 mmol/L Final     Creatinine   Date Value Ref Range Status   01/24/2017 0.92 0.52 - 1.04 mg/dL Final     BP Readings from Last 3 Encounters:   05/09/17 137/66   05/09/17 137/66   04/19/17 134/60

## 2017-05-23 NOTE — TELEPHONE ENCOUNTER
Prescription approved per [facility] Refill Protocol.    Dotty Barriga RN  [facility]

## 2017-05-25 RX ORDER — HYDROCHLOROTHIAZIDE 25 MG/1
25 TABLET ORAL DAILY
Qty: 90 TABLET | Refills: 1 | Status: SHIPPED | OUTPATIENT
Start: 2017-05-25 | End: 2017-12-01

## 2017-05-25 NOTE — TELEPHONE ENCOUNTER
Transmission failed when sent to pharmacy on 5/23/17.     RN christopher RX.     Dotty Barriga, RN  [facility]

## 2017-05-26 ENCOUNTER — HEALTH MAINTENANCE LETTER (OUTPATIENT)
Age: 67
End: 2017-05-26

## 2017-05-26 ENCOUNTER — MYC MEDICAL ADVICE (OUTPATIENT)
Dept: FAMILY MEDICINE | Facility: OTHER | Age: 67
End: 2017-05-26

## 2017-05-26 DIAGNOSIS — M70.61 TROCHANTERIC BURSITIS OF BOTH HIPS: Primary | ICD-10-CM

## 2017-05-26 DIAGNOSIS — M70.62 TROCHANTERIC BURSITIS OF BOTH HIPS: Primary | ICD-10-CM

## 2017-05-30 ENCOUNTER — INFUSION THERAPY VISIT (OUTPATIENT)
Dept: INFUSION THERAPY | Facility: CLINIC | Age: 67
End: 2017-05-30
Attending: INTERNAL MEDICINE
Payer: MEDICARE

## 2017-05-30 ENCOUNTER — ONCOLOGY VISIT (OUTPATIENT)
Dept: ONCOLOGY | Facility: CLINIC | Age: 67
End: 2017-05-30
Payer: COMMERCIAL

## 2017-05-30 VITALS
SYSTOLIC BLOOD PRESSURE: 138 MMHG | DIASTOLIC BLOOD PRESSURE: 88 MMHG | RESPIRATION RATE: 16 BRPM | OXYGEN SATURATION: 97 % | WEIGHT: 142.1 LBS | BODY MASS INDEX: 22.84 KG/M2 | HEART RATE: 96 BPM | HEIGHT: 66 IN | TEMPERATURE: 98.2 F

## 2017-05-30 VITALS — HEART RATE: 77 BPM | DIASTOLIC BLOOD PRESSURE: 79 MMHG | SYSTOLIC BLOOD PRESSURE: 145 MMHG

## 2017-05-30 DIAGNOSIS — I10 ESSENTIAL HYPERTENSION: ICD-10-CM

## 2017-05-30 DIAGNOSIS — Z51.11 ENCOUNTER FOR ANTINEOPLASTIC CHEMOTHERAPY: ICD-10-CM

## 2017-05-30 DIAGNOSIS — C50.412 MALIGNANT NEOPLASM OF UPPER-OUTER QUADRANT OF LEFT FEMALE BREAST (H): Primary | ICD-10-CM

## 2017-05-30 DIAGNOSIS — E78.5 HYPERLIPIDEMIA LDL GOAL <130: ICD-10-CM

## 2017-05-30 LAB
ALBUMIN SERPL-MCNC: 3.9 G/DL (ref 3.4–5)
ALP SERPL-CCNC: 55 U/L (ref 40–150)
ALT SERPL W P-5'-P-CCNC: 41 U/L (ref 0–50)
ANION GAP SERPL CALCULATED.3IONS-SCNC: 7 MMOL/L (ref 3–14)
AST SERPL W P-5'-P-CCNC: 30 U/L (ref 0–45)
BASOPHILS # BLD AUTO: 0 10E9/L (ref 0–0.2)
BASOPHILS NFR BLD AUTO: 0.4 %
BILIRUB SERPL-MCNC: 0.2 MG/DL (ref 0.2–1.3)
BUN SERPL-MCNC: 23 MG/DL (ref 7–30)
CALCIUM SERPL-MCNC: 9.8 MG/DL (ref 8.5–10.1)
CANCER AG27-29 SERPL-ACNC: 20 U/ML (ref 0–39)
CHLORIDE SERPL-SCNC: 106 MMOL/L (ref 94–109)
CO2 SERPL-SCNC: 28 MMOL/L (ref 20–32)
CREAT SERPL-MCNC: 0.8 MG/DL (ref 0.52–1.04)
DIFFERENTIAL METHOD BLD: ABNORMAL
EOSINOPHIL # BLD AUTO: 0.1 10E9/L (ref 0–0.7)
EOSINOPHIL NFR BLD AUTO: 1.8 %
ERYTHROCYTE [DISTWIDTH] IN BLOOD BY AUTOMATED COUNT: 16.2 % (ref 10–15)
GFR SERPL CREATININE-BSD FRML MDRD: 71 ML/MIN/1.7M2
GLUCOSE SERPL-MCNC: 91 MG/DL (ref 70–99)
HCT VFR BLD AUTO: 35.7 % (ref 35–47)
HGB BLD-MCNC: 11.3 G/DL (ref 11.7–15.7)
IMM GRANULOCYTES # BLD: 0 10E9/L (ref 0–0.4)
IMM GRANULOCYTES NFR BLD: 0 %
LYMPHOCYTES # BLD AUTO: 0.9 10E9/L (ref 0.8–5.3)
LYMPHOCYTES NFR BLD AUTO: 15.9 %
MCH RBC QN AUTO: 31 PG (ref 26.5–33)
MCHC RBC AUTO-ENTMCNC: 31.7 G/DL (ref 31.5–36.5)
MCV RBC AUTO: 98 FL (ref 78–100)
MONOCYTES # BLD AUTO: 0.4 10E9/L (ref 0–1.3)
MONOCYTES NFR BLD AUTO: 7.6 %
NEUTROPHILS # BLD AUTO: 4.2 10E9/L (ref 1.6–8.3)
NEUTROPHILS NFR BLD AUTO: 74.3 %
PLATELET # BLD AUTO: 271 10E9/L (ref 150–450)
POTASSIUM SERPL-SCNC: 3.6 MMOL/L (ref 3.4–5.3)
PROT SERPL-MCNC: 7.3 G/DL (ref 6.8–8.8)
RBC # BLD AUTO: 3.65 10E12/L (ref 3.8–5.2)
SODIUM SERPL-SCNC: 141 MMOL/L (ref 133–144)
WBC # BLD AUTO: 5.7 10E9/L (ref 4–11)

## 2017-05-30 PROCEDURE — 86300 IMMUNOASSAY TUMOR CA 15-3: CPT | Performed by: INTERNAL MEDICINE

## 2017-05-30 PROCEDURE — 80053 COMPREHEN METABOLIC PANEL: CPT | Performed by: INTERNAL MEDICINE

## 2017-05-30 PROCEDURE — 85025 COMPLETE CBC W/AUTO DIFF WBC: CPT | Performed by: INTERNAL MEDICINE

## 2017-05-30 PROCEDURE — 96413 CHEMO IV INFUSION 1 HR: CPT

## 2017-05-30 PROCEDURE — 99214 OFFICE O/P EST MOD 30 MIN: CPT | Performed by: INTERNAL MEDICINE

## 2017-05-30 PROCEDURE — 25000128 H RX IP 250 OP 636: Performed by: INTERNAL MEDICINE

## 2017-05-30 RX ORDER — DIPHENHYDRAMINE HYDROCHLORIDE 50 MG/ML
50 INJECTION INTRAMUSCULAR; INTRAVENOUS
Status: CANCELLED
Start: 2017-06-20

## 2017-05-30 RX ORDER — LORAZEPAM 2 MG/ML
.5-1 INJECTION INTRAMUSCULAR EVERY 6 HOURS PRN
Status: CANCELLED | OUTPATIENT
Start: 2017-06-20

## 2017-05-30 RX ORDER — HEPARIN SODIUM (PORCINE) LOCK FLUSH IV SOLN 100 UNIT/ML 100 UNIT/ML
500 SOLUTION INTRAVENOUS EVERY 8 HOURS
Status: DISCONTINUED | OUTPATIENT
Start: 2017-05-30 | End: 2017-05-30 | Stop reason: HOSPADM

## 2017-05-30 RX ORDER — DIPHENHYDRAMINE HYDROCHLORIDE 50 MG/ML
50 INJECTION INTRAMUSCULAR; INTRAVENOUS
Status: CANCELLED
Start: 2017-05-30

## 2017-05-30 RX ORDER — PROCHLORPERAZINE MALEATE 5 MG
10 TABLET ORAL EVERY 6 HOURS PRN
Status: CANCELLED
Start: 2017-05-30

## 2017-05-30 RX ORDER — METHYLPREDNISOLONE SODIUM SUCCINATE 125 MG/2ML
125 INJECTION, POWDER, LYOPHILIZED, FOR SOLUTION INTRAMUSCULAR; INTRAVENOUS
Status: CANCELLED
Start: 2017-05-30

## 2017-05-30 RX ORDER — LORAZEPAM 0.5 MG/1
.5-1 TABLET ORAL EVERY 6 HOURS PRN
Status: CANCELLED
Start: 2017-05-30

## 2017-05-30 RX ORDER — ACETAMINOPHEN 325 MG/1
650 TABLET ORAL
Status: CANCELLED
Start: 2017-06-20

## 2017-05-30 RX ORDER — HEPARIN SODIUM (PORCINE) LOCK FLUSH IV SOLN 100 UNIT/ML 100 UNIT/ML
500 SOLUTION INTRAVENOUS EVERY 8 HOURS
Status: CANCELLED
Start: 2017-05-30

## 2017-05-30 RX ORDER — ALBUTEROL SULFATE 0.83 MG/ML
2.5 SOLUTION RESPIRATORY (INHALATION)
Status: CANCELLED | OUTPATIENT
Start: 2017-06-20

## 2017-05-30 RX ORDER — LORAZEPAM 2 MG/ML
.5-1 INJECTION INTRAMUSCULAR EVERY 6 HOURS PRN
Status: CANCELLED | OUTPATIENT
Start: 2017-05-30

## 2017-05-30 RX ORDER — ALBUTEROL SULFATE 90 UG/1
1-2 AEROSOL, METERED RESPIRATORY (INHALATION)
Status: CANCELLED
Start: 2017-06-20

## 2017-05-30 RX ORDER — ALBUTEROL SULFATE 90 UG/1
1-2 AEROSOL, METERED RESPIRATORY (INHALATION)
Status: CANCELLED
Start: 2017-05-30

## 2017-05-30 RX ORDER — METHYLPREDNISOLONE SODIUM SUCCINATE 125 MG/2ML
125 INJECTION, POWDER, LYOPHILIZED, FOR SOLUTION INTRAMUSCULAR; INTRAVENOUS
Status: CANCELLED
Start: 2017-06-20

## 2017-05-30 RX ORDER — SODIUM CHLORIDE 9 MG/ML
1000 INJECTION, SOLUTION INTRAVENOUS CONTINUOUS PRN
Status: CANCELLED
Start: 2017-06-20

## 2017-05-30 RX ORDER — SODIUM CHLORIDE 9 MG/ML
1000 INJECTION, SOLUTION INTRAVENOUS CONTINUOUS PRN
Status: CANCELLED
Start: 2017-05-30

## 2017-05-30 RX ORDER — ALBUTEROL SULFATE 0.83 MG/ML
2.5 SOLUTION RESPIRATORY (INHALATION)
Status: CANCELLED | OUTPATIENT
Start: 2017-05-30

## 2017-05-30 RX ORDER — ACETAMINOPHEN 325 MG/1
650 TABLET ORAL
Status: CANCELLED
Start: 2017-05-30

## 2017-05-30 RX ORDER — MEPERIDINE HYDROCHLORIDE 25 MG/ML
25 INJECTION INTRAMUSCULAR; INTRAVENOUS; SUBCUTANEOUS EVERY 30 MIN PRN
Status: CANCELLED | OUTPATIENT
Start: 2017-05-30

## 2017-05-30 RX ORDER — MEPERIDINE HYDROCHLORIDE 25 MG/ML
25 INJECTION INTRAMUSCULAR; INTRAVENOUS; SUBCUTANEOUS EVERY 30 MIN PRN
Status: CANCELLED | OUTPATIENT
Start: 2017-06-20

## 2017-05-30 RX ORDER — PROCHLORPERAZINE MALEATE 5 MG
10 TABLET ORAL EVERY 6 HOURS PRN
Status: CANCELLED
Start: 2017-06-20

## 2017-05-30 RX ORDER — LORAZEPAM 0.5 MG/1
.5-1 TABLET ORAL EVERY 6 HOURS PRN
Status: CANCELLED
Start: 2017-06-20

## 2017-05-30 RX ADMIN — SODIUM CHLORIDE 250 ML: 9 INJECTION, SOLUTION INTRAVENOUS at 14:09

## 2017-05-30 RX ADMIN — TRASTUZUMAB 382 MG: KIT at 14:27

## 2017-05-30 RX ADMIN — SODIUM CHLORIDE, PRESERVATIVE FREE 500 UNITS: 5 INJECTION INTRAVENOUS at 15:15

## 2017-05-30 ASSESSMENT — PAIN SCALES - GENERAL: PAINLEVEL: NO PAIN (0)

## 2017-05-30 NOTE — PATIENT INSTRUCTIONS
Pt to return on 6/20 for Herceptin. Copies of medication list and upcoming appointments given prior to discharge.

## 2017-05-30 NOTE — ASSESSMENT & PLAN NOTE
Michalea Dorman  initially felt a mass in her left axilla. She then underwent mammogram which showed dense breasts with no suspicious lesions in 4/2016. She was then seen by surgery and underwent excisional LN biopsy that showed metastatic high-grade poorly differentiated carcinoma with a tumor size of 2.5 cm. Immunohistochemistry was done for ER/WV receptors that came back both negative. HER-2/boubacar was amplified by FISH. She then underwent MRI of the breast on 9/26/2016 that showed suspicious abnormality with multicentric left sided breast cancer that involved the left lateral breast form the nipple to the chest wall. A PET scan was obtained on 10/5/2016 that showed a hypermetabolic opacity in the left axilla wihtout other pathological activity. It was then recommended that she undergo neoadjuvant chemotherapy with Cytoxan and Doxorubicin that will be followed by Taxol, Herceptin. Patient initiated treatment on 10/11/2016.

## 2017-05-30 NOTE — NURSING NOTE
"Oncology Rooming Note    May 30, 2017 1:46 PM   Michaela Dorman is a 66 year old female who presents for:    Chief Complaint   Patient presents with     Oncology Clinic Visit     6 week follow up for breast cancer     Chemotherapy     C6D1 today with labs prior     Results     MUGA 4/19/2017     Initial Vitals: /88 (BP Location: Right arm, Patient Position: Chair, Cuff Size: Adult Regular)  Pulse 96  Temp 98.2  F (36.8  C) (Temporal)  Resp 16  Ht 1.676 m (5' 6\")  Wt 64.5 kg (142 lb 1.6 oz)  SpO2 97%  BMI 22.94 kg/m2 Estimated body mass index is 22.94 kg/(m^2) as calculated from the following:    Height as of this encounter: 1.676 m (5' 6\").    Weight as of this encounter: 64.5 kg (142 lb 1.6 oz). Body surface area is 1.73 meters squared.  No Pain (0) Comment: Data Unavailable   No LMP recorded. Patient is postmenopausal.  Allergies reviewed: Yes  Medications reviewed: Yes    Medications: Medication refills not needed today.  Pharmacy name entered into EPIC:    [facility] [address] [place] [address] - [address]  [facility] [place], [place] - [address] [facility] [place], [place] - [address] [facility]     Clinical concerns: None. Dr. Ferguson was notified.        Britta Mims MA              "

## 2017-05-30 NOTE — PATIENT INSTRUCTIONS
Please follow up in 6 weeks.      Follow Up Date/Time: 7/11/17 at 11:30    If you have any questions or concerns please feel free to call.    Juan José Hernandez RN, BSN   Oncology Care Coordinator RN  [facility]  [phone]

## 2017-05-30 NOTE — PROGRESS NOTES
Hematology/ Oncology Follow-up Visit:  May 30, 2017    Reason for Visit:   Chief Complaint   Patient presents with     Oncology Clinic Visit     6 week follow up for breast cancer     Chemotherapy     C6D1 today with labs prior     Results     MUGA 4/19/2017       Oncologic History:  Malignant neoplasm of upper-outer quadrant of left female breast (H)  Michaela Dorman  initially felt a mass in her left axilla. She then underwent mammogram which showed dense breasts with no suspicious lesions in 4/2016. She was then seen by surgery and underwent excisional LN biopsy that showed metastatic high-grade poorly differentiated carcinoma with a tumor size of 2.5 cm. Immunohistochemistry was done for ER/KS receptors that came back both negative. HER-2/boubacar was amplified by FISH. She then underwent MRI of the breast on 9/26/2016 that showed suspicious abnormality with multicentric left sided breast cancer that involved the left lateral breast form the nipple to the chest wall. A PET scan was obtained on 10/5/2016 that showed a hypermetabolic opacity in the left axilla wihtout other pathological activity. It was then recommended that she undergo neoadjuvant chemotherapy with Cytoxan and Doxorubicin that will be followed by Taxol, Herceptin. Patient initiated treatment on 10/11/2016.         Interval History:  Patient is here today for follow-up visit. She has been on Herceptin treatment. She concluded radiation therapy 3 weeks ago. She has been feeling well without any fatigue or shortness of breath or cough or wheezing. Denies any bony aches or pains. She denies any nausea or vomiting or diarrhea.    Review Of Systems:  Constitutional: Negative for fever, chills, and night sweats.  Skin: negative.  Eyes: negative.  Ears/Nose/Throat: negative.  Respiratory: No shortness of breath, dyspnea on exertion, cough, or hemoptysis.  Cardiovascular: negative.  Gastrointestinal: negative.  Genitourinary: negative.  Musculoskeletal:  "negative.  Neurologic: negative.  Psychiatric: negative.  Hematologic/Lymphatic/Immunologic: negative.  Endocrine: negative.    All other ROS negative unless mentioned in interval history.    Past medical, social, surgical, and family histories reviewed.    Allergies:  Allergies as of 05/30/2017 - Oskar as Reviewed 05/30/2017   Allergen Reaction Noted     No known drug allergies  07/05/2002       Current Medications:  Current Outpatient Prescriptions   Medication Sig Dispense Refill     hydrochlorothiazide (HYDRODIURIL) 25 MG tablet Take 1 tablet (25 mg) by mouth daily 90 tablet 1     nitrofurantoin, macrocrystal-monohydrate, (MACROBID) 100 MG capsule Take 1 capsule (100 mg) by mouth 2 times daily 14 capsule 0     venlafaxine (EFFEXOR XR) 37.5 MG 24 hr capsule Take 1 capsule (37.5 mg) by mouth daily 90 capsule 1     oxybutynin (DITROPAN) 5 MG tablet Take 0.5 tablets (2.5 mg) by mouth 2 times daily 60 tablet 0     LORazepam (ATIVAN) 0.5 MG tablet Take 1 tablet (0.5 mg) by mouth every 4 hours as needed (Anxiety, Nausea/Vomiting or Sleep) 15 tablet 0     FLUoxetine (PROZAC) 20 MG capsule TAKE THREE CAPSULES BY MOUTH EVERY DAY TITRATE AS DIRECTED 270 capsule 0     simvastatin (ZOCOR) 20 MG tablet Take 1 tablet (20 mg) by mouth At Bedtime 90 tablet 1     acetaminophen (TYLENOL EX ST ARTHRITIS PAIN) 500 MG tablet Take 500-1,000 mg by mouth every 6 hours as needed for mild pain       omeprazole (PRILOSEC) 20 MG capsule Take by mouth daily          Physical Exam:  /88 (BP Location: Right arm, Patient Position: Chair, Cuff Size: Adult Regular)  Pulse 96  Temp 98.2  F (36.8  C) (Temporal)  Resp 16  Ht 1.676 m (5' 6\")  Wt 64.5 kg (142 lb 1.6 oz)  SpO2 97%  BMI 22.94 kg/m2  Wt Readings from Last 12 Encounters:   05/30/17 64.5 kg (142 lb 1.6 oz)   05/09/17 63.5 kg (140 lb)   05/09/17 63.5 kg (140 lb)   04/19/17 64.4 kg (142 lb)   04/18/17 64.1 kg (141 lb 4.8 oz)   04/18/17 64.1 kg (141 lb 4.8 oz)   03/29/17 65 kg " "(143 lb 4.8 oz)   03/28/17 64.3 kg (141 lb 11.2 oz)   03/21/17 64.2 kg (141 lb 9.6 oz)   03/07/17 63.8 kg (140 lb 9.6 oz)   02/28/17 63.2 kg (139 lb 4.8 oz)   02/16/17 64.3 kg (141 lb 12.8 oz)     ECOG performance status: 0  GENERAL APPEARANCE: Healthy, alert and in no acute distress.  HEENT: Sclerae anicteric. PERRLA. Oropharynx without ulcers, lesions, or thrush.  NECK: Supple. No asymmetry or masses.  LYMPHATICS: No palpable cervical, supraclavicular, axillary, or inguinal lymphadenopathy.  RESP: Lungs clear to auscultation bilaterally without rales, rhonchi or wheezes.  BREAST: Scars of bilateral mastectomies  \"CARDIOVASCULAR: Regular rate and rhythm. Normal S1, S2; no S3 or S4. No murmur, gallop, or rub.  ABDOMEN: Soft, nontender. Bowel sounds normal. No palpable organomegaly or masses.  MUSCULOSKELETAL: Extremities without gross deformities noted. No edema of bilateral lower extremities.  SKIN: No suspicious lesions or rashes.  NEURO: Alert and oriented x 3. Cranial nerves II-XII grossly intact.  PSYCHIATRIC: Mentation and affect appear normal.    Laboratory/Imaging Studies:  No visits with results within 2 Week(s) from this visit.  Latest known visit with results is:    Office Visit on 04/19/2017   Component Date Value Ref Range Status     Color Urine 04/19/2017 Orange   Final     Appearance Urine 04/19/2017 Clear   Final     Glucose Urine 04/19/2017 100* NEG mg/dL Final     Bilirubin Urine 04/19/2017 Negative  NEG Final     Ketones Urine 04/19/2017 Negative  NEG mg/dL Final     Specific Gravity Urine 04/19/2017 1.015  1.003 - 1.035 Final     Blood Urine 04/19/2017 Trace* NEG Final     pH Urine 04/19/2017 7.0  5.0 - 7.0 pH Final     Protein Albumin Urine 04/19/2017 Negative  NEG mg/dL Final     Urobilinogen Urine 04/19/2017 0.2  0.2 - 1.0 EU/dL Final     Nitrite Urine 04/19/2017 Positive* NEG Final     Leukocyte Esterase Urine 04/19/2017 Negative  NEG Final     Source 04/19/2017 Midstream Urine   Final     " WBC Urine 04/19/2017 O - 2  0 - 2 /HPF Final     RBC Urine 04/19/2017 O - 2  0 - 2 /HPF Final     Bacteria Urine 04/19/2017 Few* NEG /HPF Final     Specimen Description 04/19/2017 Midstream Urine   Final     Culture Micro 04/19/2017 No growth   Final     Micro Report Status 04/19/2017 FINAL 04/21/2017   Final            Assessment and plan:    (C50.412) Malignant neoplasm of upper-outer quadrant of left female breast (H)  (primary encounter diagnosis)  Patient currently on adjuvant Herceptin treatment. She has been tolerating treatment well without significant side effects. We will continue to monitor the patient. I will see her again in 6 weeks or sooner if new problems or concerns.    (I10) Essential hypertension  Patient currently on hydrochlorothiazide 25 mg orally daily.*    (E78.5) Hyperlipidemia LDL goal <130  Patient currently on Zocor 20 mg orally daily.    The patient is ready to learn, no apparent learning barriers were identified.  Diagnosis and treatment plans were explained to the patient. The patient expressed understanding of the content. The patient asked appropriate questions. The patient questions were answered to her satisfaction.    Chart documentation with Dragon Voice recognition Software. Although reviewed after completion, some words and grammatical errors may remain.

## 2017-05-30 NOTE — PROGRESS NOTES
Here for chemotherapy, tolerated well.  Labs/BSA/Dosing verified by RN x2. Positive blood return noted pre and post chemotherapy administration.pt discharged in stable condition.

## 2017-05-30 NOTE — MR AVS SNAPSHOT
After Visit Summary   5/30/2017    Michaela Dorman    MRN: [id number]           Patient Information     Date Of Birth          1950        Visit Information        Provider Department      5/30/2017 1:30 PM Syeda Ferguson MD [facility]        Today's Diagnoses     Malignant neoplasm of upper-outer quadrant of left female breast (H)    -  1    Essential hypertension        Hyperlipidemia LDL goal <130          Care Instructions      Please follow up in 6 weeks.      Follow Up Date/Time: 7/11/17 at 11:30    If you have any questions or concerns please feel free to call.    Juan José Hernandez, RN, BSN   Oncology Care Coordinator RN  [facility]  [phone]              Follow-ups after your visit        Follow-up notes from your care team     Return in about 6 weeks (around 7/11/2017).      Your next 10 appointments already scheduled     Jul 11, 2017 11:30 AM CDT   Return Visit with Syeda Ferguson MD   [facility] ([facility])    [address] [place] [id number] [phone]              Who to contact     If you have questions or need follow up information about today's clinic visit or your schedule please contact [facility] directly at [phone].  Normal or non-critical lab and imaging results will be communicated to you by [email or web address], letter or phone within 4 business days after the clinic has received the results. If you do not hear from us within 7 days, please contact the clinic through [email or web address] or phone. If you have a critical or abnormal lab result, we will notify you by phone as soon as possible.  Submit refill requests through [employer] or call your pharmacy and they will forward the refill request to us. Please allow 3 business days for your refill to be completed.          Additional Information About Your Visit        [email or web address] Information     [employer] gives you secure access to your electronic health  "record. If you see a primary care provider, you can also send messages to your care team and make appointments. If you have questions, please call your primary care clinic.  If you do not have a primary care provider, please call [phone] and they will assist you.        Care EveryWhere ID     This is your Care EveryWhere ID. This could be used by other organizations to access your [place] medical records  [id number]        Your Vitals Were     Pulse Temperature Respirations Height Pulse Oximetry BMI (Body Mass Index)    96 98.2  F (36.8  C) (Temporal) 16 1.676 m (5' 6\") 97% 22.94 kg/m2       Blood Pressure from Last 3 Encounters:   05/30/17 138/88   05/09/17 137/66   05/09/17 137/66    Weight from Last 3 Encounters:   05/30/17 64.5 kg (142 lb 1.6 oz)   05/09/17 63.5 kg (140 lb)   05/09/17 63.5 kg (140 lb)              Today, you had the following     No orders found for display       Primary Care Provider Office Phone # Fax #    Phani Tapia PA-C [phone]       [facility] [address] [place] [id number]        Thank you!     Thank you for choosing [facility]  for your care. Our goal is always to provide you with excellent care. Hearing back from our patients is one way we can continue to improve our services. Please take a few minutes to complete the written survey that you may receive in the mail after your visit with us. Thank you!             Your Updated Medication List - Protect others around you: Learn how to safely use, store and throw away your medicines at www.disposemymeds.org.          This list is accurate as of: 5/30/17  2:21 PM.  Always use your most recent med list.                   Brand Name Dispense Instructions for use    FLUoxetine 20 MG capsule    PROzac    270 capsule    TAKE THREE CAPSULES BY MOUTH EVERY DAY TITRATE AS DIRECTED       hydrochlorothiazide 25 MG tablet    HYDRODIURIL    90 tablet    Take 1 tablet (25 mg) by mouth daily    "    LORazepam 0.5 MG tablet    ATIVAN    15 tablet    Take 1 tablet (0.5 mg) by mouth every 4 hours as needed (Anxiety, Nausea/Vomiting or Sleep)       nitrofurantoin (macrocrystal-monohydrate) 100 MG capsule    MACROBID    14 capsule    Take 1 capsule (100 mg) by mouth 2 times daily       oxybutynin 5 MG tablet    DITROPAN    60 tablet    Take 0.5 tablets (2.5 mg) by mouth 2 times daily       priLOSEC 20 MG CR capsule   Generic drug:  omeprazole      Take by mouth daily       simvastatin 20 MG tablet    ZOCOR    90 tablet    Take 1 tablet (20 mg) by mouth At Bedtime       TYLENOL EX ST ARTHRITIS PAIN 500 MG tablet   Generic drug:  acetaminophen      Take 500-1,000 mg by mouth every 6 hours as needed for mild pain       venlafaxine 37.5 MG 24 hr capsule    EFFEXOR XR    90 capsule    Take 1 capsule (37.5 mg) by mouth daily

## 2017-05-30 NOTE — MR AVS SNAPSHOT
After Visit Summary   5/30/2017    Michaela Dorman    MRN: [id number]           Patient Information     Date Of Birth          1950        Visit Information        Provider Department      5/30/2017 2:00 PM NL INFUSION CHAIR 2 [facility]        Today's Diagnoses     Malignant neoplasm of upper-outer quadrant of left female breast (H)    -  1    Encounter for antineoplastic chemotherapy          Care Instructions    Pt to return on 6/20 for Herceptin. Copies of medication list and upcoming appointments given prior to discharge.           Follow-ups after your visit        Follow-up notes from your care team     Return in 3 weeks (on 6/20/2017).      Your next 10 appointments already scheduled     Jun 20, 2017  1:30 PM CDT   Level 2 with NL INFUSION CHAIR 3   [facility] ([facility])    [address] [place] [address] [address] [place] [address]   [phone]            Jul 11, 2017 11:30 AM CDT   [facility]    [address] [place] [address]   [phone]            Jul 11, 2017 12:30 PM CDT   Level 2 with NL INFUSION CHAIR 4   [facility] ([facility])    [address] [place] [address] [address] [place] [address]   [phone]              Who to contact     If you have questions or need follow up information about today's clinic visit or your schedule please contact [facility] directly at [phone].  Normal or non-critical lab and imaging results will be communicated to you by Ardianhart, letter or phone within 4 business days after the clinic has received the results. If you do not hear from us within 7 days, please contact the clinic through Ardianhart or phone. If you have a critical or abnormal lab result, we will notify you by phone as soon as possible.  Submit refill requests through [employer]  or call your pharmacy and they will forward the refill request to us. Please allow 3 business days for your refill to be completed.          Additional Information About Your Visit        XGraphhart Information     [employer] gives you secure access to your electronic health record. If you see a primary care provider, you can also send messages to your care team and make appointments. If you have questions, please call your primary care clinic.  If you do not have a primary care provider, please call [phone] and they will assist you.        Care EveryWhere ID     This is your Care EveryWhere ID. This could be used by other organizations to access your [place] medical records  [id number]        Your Vitals Were     Pulse                   77            Blood Pressure from Last 3 Encounters:   05/30/17 145/79   05/30/17 138/88   05/09/17 137/66    Weight from Last 3 Encounters:   05/30/17 64.5 kg (142 lb 1.6 oz)   05/09/17 63.5 kg (140 lb)   05/09/17 63.5 kg (140 lb)              We Performed the Following     Ca27.29  breast tumor marker     CBC with platelets differential     Comprehensive metabolic panel        Primary Care Provider Office Phone # Fax #    Phani Tapia PA-C [phone]       [place] [address] [place] [address]        Thank you!     Thank you for choosing [facility]  for your care. Our goal is always to provide you with excellent care. Hearing back from our patients is one way we can continue to improve our services. Please take a few minutes to complete the written survey that you may receive in the mail after your visit with us. Thank you!             Your Updated Medication List - Protect others around you: Learn how to safely use, store and throw away your medicines at www.disposemymeds.org.          This list is accurate as of: 5/30/17  3:30 PM.  Always use your most recent med list.                   Brand Name Dispense  Instructions for use    FLUoxetine 20 MG capsule    PROzac    270 capsule    TAKE THREE CAPSULES BY MOUTH EVERY DAY TITRATE AS DIRECTED       hydrochlorothiazide 25 MG tablet    HYDRODIURIL    90 tablet    Take 1 tablet (25 mg) by mouth daily       LORazepam 0.5 MG tablet    ATIVAN    15 tablet    Take 1 tablet (0.5 mg) by mouth every 4 hours as needed (Anxiety, Nausea/Vomiting or Sleep)       nitrofurantoin (macrocrystal-monohydrate) 100 MG capsule    MACROBID    14 capsule    Take 1 capsule (100 mg) by mouth 2 times daily       oxybutynin 5 MG tablet    DITROPAN    60 tablet    Take 0.5 tablets (2.5 mg) by mouth 2 times daily       priLOSEC 20 MG CR capsule   Generic drug:  omeprazole      Take by mouth daily       simvastatin 20 MG tablet    ZOCOR    90 tablet    Take 1 tablet (20 mg) by mouth At Bedtime       TYLENOL EX ST ARTHRITIS PAIN 500 MG tablet   Generic drug:  acetaminophen      Take 500-1,000 mg by mouth every 6 hours as needed for mild pain       venlafaxine 37.5 MG 24 hr capsule    EFFEXOR XR    90 capsule    Take 1 capsule (37.5 mg) by mouth daily

## 2017-05-30 NOTE — NURSING NOTE
DISCHARGE PLAN:  Next appointments: See patient instruction section  Departure Mode: Ambulatory  Accompanied by: Unknown  0 minutes for nursing discharge (face to face time)     Michaela Dorman is here today for Oncology follow up with Herceptin.  Patient was not seen by writing nurse at time of appointment. Patient to follow up in 6 weeks.  AVS brought to infusion. Appointments scheduled. See patient instructions and Oncologist's Progress note for further details. Questions and concerns addressed to patient's satisfaction. Patient verbalized and demonstrated understanding of plan.  Contact information provided and patient is encouraged to call with any that arise in the interim of care.    Juan José Hernandez, RN, BSN, OCN   Oncology Care Coordinator RN  [facility]  [phone]  5/30/2017, 2:21 PM

## 2017-06-02 ENCOUNTER — MYC MEDICAL ADVICE (OUTPATIENT)
Dept: FAMILY MEDICINE | Facility: OTHER | Age: 67
End: 2017-06-02

## 2017-06-02 ENCOUNTER — HOSPITAL ENCOUNTER (OUTPATIENT)
Dept: PHYSICAL THERAPY | Facility: CLINIC | Age: 67
Setting detail: THERAPIES SERIES
End: 2017-06-02
Attending: PHYSICIAN ASSISTANT
Payer: MEDICARE

## 2017-06-02 DIAGNOSIS — Z96.611 S/P REVERSE TOTAL SHOULDER ARTHROPLASTY, RIGHT: Primary | ICD-10-CM

## 2017-06-02 PROCEDURE — 97110 THERAPEUTIC EXERCISES: CPT | Mod: GP | Performed by: PHYSICAL THERAPIST

## 2017-06-02 PROCEDURE — 40000718 ZZHC STATISTIC PT DEPARTMENT ORTHO VISIT: Performed by: PHYSICAL THERAPIST

## 2017-06-02 PROCEDURE — 97140 MANUAL THERAPY 1/> REGIONS: CPT | Mod: GP | Performed by: PHYSICAL THERAPIST

## 2017-06-02 PROCEDURE — G8982 BODY POS GOAL STATUS: HCPCS | Mod: GP,CI | Performed by: PHYSICAL THERAPIST

## 2017-06-02 PROCEDURE — G8981 BODY POS CURRENT STATUS: HCPCS | Mod: GP,CK | Performed by: PHYSICAL THERAPIST

## 2017-06-02 PROCEDURE — 97162 PT EVAL MOD COMPLEX 30 MIN: CPT | Mod: GP | Performed by: PHYSICAL THERAPIST

## 2017-06-02 NOTE — PROGRESS NOTES
06/02/17 0700   General Information   Type of Visit Initial OP Ortho PT Evaluation   Start of Care Date 06/02/17   Referring Physician Phani Jason PA-c   Patient/Family Goals Statement Eliminate pain, increase activity   Orders Per Therapist Evaluation   Date of Order 05/30/17   Insurance Type Medicare   Insurance Comments/Visits Authorized BCBS secondary   Medical Diagnosis Trochanteric bursitis of both hips   Surgical/Medical history reviewed Yes   Precautions/Limitations other (see comments)  (Actively on Chemo for breast cancer)   Weight-Bearing Status - LUE weight-bearing as tolerated   Weight-Bearing Status - RUE weight-bearing as tolerated   Weight-Bearing Status - LLE weight-bearing as tolerated   Weight-Bearing Status - RLE weight-bearing as tolerated   Body Part(s)   Body Part(s) Lumbar Spine/SI;Hip   Presentation and Etiology   Pertinent history of current problem (include personal factors and/or comorbidities that impact the POC) Chief complaint: Central  LBP with L leg pain to ankle laterally. Initally symptoms presented/started with static standing and now occurring with all activities including sitting, standing, walking. Started about 6 weeks, insidious onset other than seems to be sitting more than usual. (As we go through our exam and writer continues to question, she does now recall around time this started that she got knocked over by her house from standing and fell backwards onto her buttocks. No immediate pain, so had not connected to this symptom). Sitting more than usual/typical for her is related to fatigue and weakness from her cancer related treatment. Bilateral mastectomy, L Axillary node dissection, chemo treatments, currently1 time every 3 weeks of Herceptin, had radiation which was completed recently. Will continue to do her Herceptin through October. No imaging to date. No metastasis known at this time. No plans for reconstruction. As far has her back/leg, has hx of L hip bursitis  for years. Since she has Has had L hip bursitis, injections seem helpful, but the most recent L hip injection didn't help these symptoms in leg. Has history of L ankle fracture at age 15, currently undergoing treatment for cancer,  R shoulder replacement,  R 5th metatarsal fracture 2015 with plates. No other surgeries.  Had PT from years ago from her low back probably 40 years ago which was very helpful. Back pain constant, leg pain constant 100% to ankle. Alleviating/Easing factors include:  Walking, repositioning.  Pain is 1-2/10 at best, 5-6/10 worst, and 5/10 currently. Seems to get worse with sitting > 30 minutes, standing 10-15 minutes tolerated. Walking is somewhat limited due to her overall fatigue levels from cancer treatments. Patient is self reporting their current level of function to be <40% compared to their baseline in relation to her low back as well as her cancer related treatments and fatigue.  Prior interventions include injections to L hip very acutely helpful in the bursa pain but not in leg overall, Chiropractic care including adjustments without success, increased symptoms at the time.  Patient takes Tylenol  medication for this on a frequent basis, occasionally takes Oxycodone for pain on occasion. Has not tried Heat or ice.  Patient is retired. Enjoys riding horses and  Prior to Cancer treatments, was active at the gym several days per week doing weight training, training, walking. Goal for PT: Eliminate pain and improve activity tolerance. She is motivated to   Impairments D. Decreased ROM;E. Decreased flexibility;A. Pain   Functional Limitations perform activities of daily living;perform desired leisure / sports activities   Symptom Location L low back, radiates laterally to L lateral ankle   How/Where did it occur From insidious onset;With a fall   Onset date of current episode/exacerbation 04/14/17  (Approx 6 weeks ago)   Chronicity New   Pain/symptoms exacerbated by A. Sitting;C.  Lifting;G. Certain positions   Pain/symptoms eased by E. Changing positions;B. Walking   Progression of symptoms since onset: Worsened   Current Level of Function   Patient role/employment history F. Retired   Living environment House/townhome   Current equipment-Gait/Locomotion None   Current equipment-ADL None   Fall Risk Screen   Fall screen completed by PT   Per patient - Fall 2 or more times in past year? No   Per patient - Fall with injury in past year? No   Is patient a fall risk? No   Fall screen comments Horse knocked her over around time of incident but not falls. Reduced activity tolerance from cancer txs, but able to care for self   Functional Scales   Other Scales  LEFS 34/80   Lumbar Spine/SI Objective Findings   Observation Standing with weight slightly shifted to R   Integumentary Chemo port chest, B mastectomy scars   Posture MIld forward head, mild rounded shoulder, min reduced lumbar lordosis   Gait/Locomotion Non antalgic, shortened step length B   Flexion ROM Fingertips to toes, aberrancy noted on return to stance at sacrum with poor lumbopelvis rhythm   Extension ROM 50% limited and pain at end range   Right Side Bending ROM 25% limited, R HS pain   Left Side Bending ROM 50% limited and increased L leg and L back/posterior hip pain   Repeated Extension-Standing ROM increased symptoms in leg, resolves to baseline 3 after return   Repeated Flexion-Standing ROM x5 seems to help, then next 5 increases leg pain   Lumbar ROM Comment Prone pain .4/10 in low back post tx. 0/10 low back or leg pain over 1 pillow and a shin pillow.    Pelvic Screen Sacrum in R SB and tight L hip musculature, Coccyx L   Hip Screen L hip ROM more limited in ER/IR relative to R   Hip Flexion (L2) Strength 4   Knee Extension (L3) Strength 4   Ankle Dorsiflexion (L4) Strength 4   Great Toe Extension (L5) Strength 4   Ankle Plantar Flexion (S1) Strength 4   Lumbar/Hip/Knee/Foot Strength Comments 4   Hamstring Flexibility  Generalized mild weakness from chemo treatments vs myotomal/dermatomal pattern   Hip Flexor Flexibility Tight L>R   Quadricep Flexibility PKB > 110 B   Piriformis Flexibility Tight L   Spring Test Segmental rotation Lumbar L4/ and 5 to L. tight Local distraction Sacrum on L5   Segmental Mobility Limited lumbar and thoracic    Sensation Testing Intact, symptoms occuring along L4 dermatome region   Patellar Tendon Reflexes  2+   Palpation Tightness along L sacrum, L paraspinals though not tender to touch. Innominate rotated anteriorly with some medial pull on innominate also. Tight sacrum. Only very min tender to L hip bursa near trochanter   Planned Therapy Interventions   Planned Therapy Interventions manual therapy;joint mobilization;strengthening;stretching;ROM   Planned Modality Interventions   Planned Modality Interventions Comments Ice   Clinical Impression   Criteria for Skilled Therapeutic Interventions Met yes, treatment indicated   PT Diagnosis Mechanical sacral and lumbar dysfunction with muscular guarding, radiculopathy near piriformis vs lumbar nerve root   Influenced by the following impairments Palpation, ROM, posture   Functional limitations due to impairments Sitting, standing, walking   Clinical Presentation Evolving/Changing   Clinical Presentation Rationale Current overlay of fatigue and reduced strength from cancer tx, hx of other B foot/ankle    Clinical Decision Making (Complexity) Moderate complexity   Therapy Frequency 2 times/Week   Predicted Duration of Therapy Intervention (days/wks) 8 weeks, anticpate 12 visits   Risk & Benefits of therapy have been explained Yes   Patient, Family & other staff in agreement with plan of care Yes   Clinical Impression Comments Mechanical LBP, sacral dysfunction suspect conributing from fall on her buttocks a few weeks ago. Able to centralize symptoms during positioning and exam today   Ortho Goal 1   Goal Identifier 1   Goal Description Patient will  improve activity tolerance via LEFS improvements from 34/80 to 55/80 to return to recreational workout routines.    Target Date 07/28/17   Ortho Goal 2   Goal Identifier 2   Goal Description Patient will note ability to sit for 2 hours without pain to better tolerance recreational and home based activities in 6 weeks   Target Date 07/14/17   Ortho Goal 3   Goal Identifier 3   Goal Description Patient will report no increased back pain during light exercise routine in 8 weeks   Target Date 07/28/17   Total Evaluation Time   Total Evaluation Time 30   Therapy Certification   Certification date from 06/02/17   Certification date to 07/28/17   Medical Diagnosis L leg pain, radicular in nature from lumbopelvic origin

## 2017-06-02 NOTE — PROGRESS NOTES
[facility]      OUTPATIENT PHYSICAL THERAPY ORTHOPEDIC EVALUATION  PLAN OF TREATMENT FOR OUTPATIENT REHABILITATION  (COMPLETE FOR INITIAL CLAIMS ONLY)  Patient's Last Name, First Name, KENYONJacqueDONTEJacque  MarshaalmazgriceldaMichaela PRESCOTT    Provider s Name:  [facility]   Medical Record No.  [id number]   Start of Care Date:  06/02/17   Onset Date:  04/14/17 (Approx 6 weeks ago)   Type:     _X__PT   ___OT   ___SLP Medical Diagnosis:  L leg pain, radicular in nature from lumbopelvic origin     PT Diagnosis:  Mechanical sacral and lumbar dysfunction with muscular guarding, radiculopathy near piriformis vs lumbar nerve root   Visits from SOC:  1   Therapist assessment: ________________________________________________________  Plan of Treatment/Functional Goals:  manual therapy, joint mobilization, strengthening, stretching, ROM        Ice  Goals  Goal Identifier: 1  Goal Description: Patient will improve activity tolerance via LEFS improvements from 34/80 to 55/80 to return to recreational workout routines.   Target Date: 07/28/17    Goal Identifier: 2  Goal Description: Patient will note ability to sit for 2 hours without pain to better tolerance recreational and home based activities in 6 weeks  Target Date: 07/14/17    Goal Identifier: 3  Goal Description: Patient will report no increased back pain during light exercise routine in 8 weeks  Target Date: 07/28/17       Therapy Frequency:  2 times/Week  Predicted Duration of Therapy Intervention:  8 weeks, anticpate 12 visits    Radha Preston PT                 I CERTIFY THE NEED FOR THESE SERVICES FURNISHED UNDER        THIS PLAN OF TREATMENT AND WHILE UNDER MY CARE     (Physician co-signature of this document indicates review and certification of the therapy plan).                        Certification Date From:  06/02/17   Certification Date To:  07/28/17    Referring Provider:  Phani Jason PA-c (P)    Initial Assessment        See Epic Evaluation Start  of Care Date: 06/02/17             Radha Nelson................... PT, DPT, CLT   6/2/2017, 10:36 AM  [phone]

## 2017-06-05 ENCOUNTER — MYC MEDICAL ADVICE (OUTPATIENT)
Dept: FAMILY MEDICINE | Facility: OTHER | Age: 67
End: 2017-06-05

## 2017-06-05 PROBLEM — Z96.611 S/P REVERSE TOTAL SHOULDER ARTHROPLASTY, RIGHT: Status: ACTIVE | Noted: 2017-06-05

## 2017-06-05 RX ORDER — AZITHROMYCIN 250 MG/1
500 TABLET, FILM COATED ORAL ONCE
Qty: 2 TABLET | Refills: 0 | Status: SHIPPED | OUTPATIENT
Start: 2017-06-05 | End: 2017-06-05

## 2017-06-06 ENCOUNTER — MYC MEDICAL ADVICE (OUTPATIENT)
Dept: DERMATOLOGY | Facility: CLINIC | Age: 67
End: 2017-06-06

## 2017-06-06 ENCOUNTER — HOSPITAL ENCOUNTER (OUTPATIENT)
Dept: PHYSICAL THERAPY | Facility: CLINIC | Age: 67
Setting detail: THERAPIES SERIES
End: 2017-06-06
Attending: PHYSICIAN ASSISTANT
Payer: MEDICARE

## 2017-06-06 PROCEDURE — 40000718 ZZHC STATISTIC PT DEPARTMENT ORTHO VISIT: Performed by: PHYSICAL THERAPIST

## 2017-06-06 PROCEDURE — 97140 MANUAL THERAPY 1/> REGIONS: CPT | Mod: GP | Performed by: PHYSICAL THERAPIST

## 2017-06-06 NOTE — TELEPHONE ENCOUNTER
Left message for patient to call [email or web address] at [phone] to schedule her skin check as requested.    Hilda Eli LPN

## 2017-06-13 ENCOUNTER — HOSPITAL ENCOUNTER (OUTPATIENT)
Dept: PHYSICAL THERAPY | Facility: CLINIC | Age: 67
Setting detail: THERAPIES SERIES
End: 2017-06-13
Attending: PHYSICIAN ASSISTANT
Payer: MEDICARE

## 2017-06-13 DIAGNOSIS — F43.21 ADJUSTMENT DISORDER WITH DEPRESSED MOOD: Primary | ICD-10-CM

## 2017-06-13 PROCEDURE — 40000718 ZZHC STATISTIC PT DEPARTMENT ORTHO VISIT: Performed by: PHYSICAL THERAPIST

## 2017-06-13 PROCEDURE — 97110 THERAPEUTIC EXERCISES: CPT | Mod: GP | Performed by: PHYSICAL THERAPIST

## 2017-06-13 PROCEDURE — 97140 MANUAL THERAPY 1/> REGIONS: CPT | Mod: GP | Performed by: PHYSICAL THERAPIST

## 2017-06-13 NOTE — TELEPHONE ENCOUNTER
FLUOXETINE HCL 20MG CAPS     /Last Written Prescription Date: 03/07/17  Last Fill Quantity: 270, # refills: 0  Last Office Visit with [facility] primary care provider:  04/19/17   Next 5 appointments (look out 90 days)     Jackson 15, 2017 11:15 AM CDT   [facility] Felipe [facility]   [facility]    [address] [place] [id number] [phone]            Jul 11, 2017 11:30 AM CDT   [facility]    [address] [place] [address] [id number] [phone]                   Last PHQ-9 score on record=   PHQ-9 SCORE 3/29/2017   Total Score -   Total Score 15

## 2017-06-14 NOTE — TELEPHONE ENCOUNTER
Routing refill request to provider for review/approval because:  PHQ-9 >4    Dotty Barriga, RN  [facility]

## 2017-06-15 ENCOUNTER — OFFICE VISIT (OUTPATIENT)
Dept: DERMATOLOGY | Facility: CLINIC | Age: 67
End: 2017-06-15
Payer: COMMERCIAL

## 2017-06-15 DIAGNOSIS — L57.0 ACTINIC KERATOSIS: ICD-10-CM

## 2017-06-15 DIAGNOSIS — T14.8XXA EXCORIATION: ICD-10-CM

## 2017-06-15 DIAGNOSIS — Z85.828 HISTORY OF NONMELANOMA SKIN CANCER: Primary | ICD-10-CM

## 2017-06-15 DIAGNOSIS — D48.5 NEOPLASM OF UNCERTAIN BEHAVIOR OF SKIN: ICD-10-CM

## 2017-06-15 PROCEDURE — 17000 DESTRUCT PREMALG LESION: CPT | Performed by: DERMATOLOGY

## 2017-06-15 PROCEDURE — 11100 HC DESTRUCT PREMALIGNANT LESION, FIRST: CPT | Mod: 59 | Performed by: DERMATOLOGY

## 2017-06-15 PROCEDURE — 99213 OFFICE O/P EST LOW 20 MIN: CPT | Mod: 25 | Performed by: DERMATOLOGY

## 2017-06-15 PROCEDURE — 17003 DESTRUCT PREMALG LES 2-14: CPT | Performed by: DERMATOLOGY

## 2017-06-15 NOTE — NURSING NOTE
Dermatology Rooming Note    Michaela Dorman's goals for this visit include:   Chief Complaint   Patient presents with     Derm Problem     skin check, Hx of BCC pt has a spot on chin Rt side of nead near ear and LT eyebrow       Is a scribe okay for this visit:YES    Are records needed for this visit(If yes, obtain release of information): Not applicable     Vitals: There were no vitals taken for this visit.    Referring Provider:  No referring provider defined for this encounter.

## 2017-06-15 NOTE — PATIENT INSTRUCTIONS
It was a pleasure to see you at your recent visit in Dermatology.    We will schedule your next follow up appointment however, your appointment may be rescheduled due to any unforseen circumstances.    If you have any questions or concerns, please feel free to contact us at [facility] at [phone].        Cryotherapy    What is it?    Use of a very cold liquid, such as liquid nitrogen, to freeze and destroy abnormal skin cells that need to be removed    What should I expect?    Tenderness and redness    A small blister that might grow and fill with dark purple blood. There may be crusting.    More than one treatment may be needed if the lesions do not go away.    How do I care for the treated area?    Gently wash the area with your hands when bathing.    Use a thin layer of Vaseline to help with healing. You may use a Band-Aid.     The area should heal within 7-10 days and may leave behind a pink or lighter color.     Do not use an antibiotic or Neosporin ointment.     You may take acetaminophen (Tylenol) for pain.     Call your Doctor if you have:    Severe pain    Signs of infection (warmth, redness, cloudy yellow drainage, and or a bad smell)    Questions or concerns    Who should I call with questions?       [facility]: [phone]       [facility]: [phone]       For urgent needs outside of business hours call the [facility] at [phone]        and ask for the dermatology resident on call    Wound Care After a Biopsy    What is a skin biopsy?  A skin biopsy allows the doctor to examine a very small piece of tissue under the microscope to determine the diagnosis and the best treatment for the skin condition. A local anesthetic (numbing medicine)  is injected with a very small needle into the skin area to be tested. A small piece of skin is taken from the area. Sometimes a suture (stitch) is used.      What are the risks of a skin biopsy?  I will experience scar, bleeding, swelling, pain, crusting and redness. I may experience incomplete removal or recurrence. Risks of this procedure are excessive bleeding, bruising, infection, nerve damage, numbness, thick (hypertrophic or keloidal) scar and non-diagnostic biopsy.    How should I care for my wound for the first 24 hours?    Keep the wound dry and covered for 24 hours    If it bleeds, hold direct pressure on the area for 15 minutes. If bleeding does not stop then go to the emergency room    Avoid strenuous exercise the first 1-2 days or as your doctor instructs you    How should I care for the wound after 24 hours?    After 24 hours, remove the bandage    You may bathe or shower as normal    If you had a scalp biopsy, you can shampoo as usual and can use shower water to clean the biopsy site daily    Clean the wound twice a day with gentle soap and water    Do not scrub, be gentle    Apply white petroleum/Vaseline after cleaning the wound with a cotton swab or a clean finger, and keep the site covered with a Bandaid /bandage. Bandages are not necessary with a scalp biopsy    If you are unable to cover the site with a Bandaid /bandage, re-apply ointment 2-3 times a day to keep the site moist. Moisture will help with healing    Avoid strenuous activity for first 1-2 days    Avoid lakes, rivers, pools, and oceans until the stitches are removed or the site is healed    How do I clean my wound?    Wash hands thoroughly with soap or use hand  before all wound care    Clean the wound with gentle soap and water    Apply white petroleum/Vaseline  to wound after it is clean    Replace the Bandaid /bandage to keep the wound covered for the first few days or as instructed by your doctor    If you had a scalp biopsy, warm shower water to the area on a daily basis should suffice    What should I use to clean my wound?     Cotton-tipped applicators (Qtips )    White  petroleum jelly (Vaseline ). Use a clean new container and use Q-tips to apply.    Bandaids   as needed    Gentle soap     How should I care for my wound long term?    Do not get your wound dirty    Keep up with wound care for one week or until the area is healed.    A small scab will form and fall off by itself when the area is completely healed. The area will be red and will become pink in color as it heals. Sun protection is very important for how your scar will turn out. Sunscreen with an SPF 30 or greater is recommended once the area is healed.    You should have some soreness but it should be mild and slowly go away over several days. Talk to your doctor about using tylenol for pain,    When should I call my doctor?  If you have increased:     Pain or swelling    Pus or drainage (clear or slightly yellow drainage is ok)    Temperature over 100F    Spreading redness or warmth around wound    When will I hear about my results?  The biopsy results can take 2-3 weeks to come back. The clinic will call you with the results, send you a [employer] message, or have you schedule a follow-up clinic or phone time to discuss the results. Contact our clinics if you do not hear from us in 3 weeks.     Who should I call with questions?    [facility]: [phone]     [facility]: [phone]    For urgent needs outside of business hours call the [facility] at [phone] and ask for the dermatology resident on call

## 2017-06-15 NOTE — MR AVS SNAPSHOT
After Visit Summary   6/15/2017    Michaela Dorman    MRN: [id number]           Patient Information     Date Of Birth          1950        Visit Information        Provider Department      6/15/2017 11:15 AM Loren Felipe MD [facility]        Today's Diagnoses     History of nonmelanoma skin cancer    -  1    Actinic keratosis        Excoriation        Neoplasm of uncertain behavior of skin          Care Instructions    It was a pleasure to see you at your recent visit in Dermatology.    We will schedule your next follow up appointment however, your appointment may be rescheduled due to any unforseen circumstances.    If you have any questions or concerns, please feel free to contact us at [facility] at [phone].        Cryotherapy    What is it?    Use of a very cold liquid, such as liquid nitrogen, to freeze and destroy abnormal skin cells that need to be removed    What should I expect?    Tenderness and redness    A small blister that might grow and fill with dark purple blood. There may be crusting.    More than one treatment may be needed if the lesions do not go away.    How do I care for the treated area?    Gently wash the area with your hands when bathing.    Use a thin layer of Vaseline to help with healing. You may use a Band-Aid.     The area should heal within 7-10 days and may leave behind a pink or lighter color.     Do not use an antibiotic or Neosporin ointment.     You may take acetaminophen (Tylenol) for pain.     Call your Doctor if you have:    Severe pain    Signs of infection (warmth, redness, cloudy yellow drainage, and or a bad smell)    Questions or concerns    Who should I call with questions?       [facility]: [phone]       [facility]: [phone]       For urgent needs outside of business hours call the [facility] at [phone]         and ask for the dermatology resident on call    Wound Care After a Biopsy    What is a skin biopsy?  A skin biopsy allows the doctor to examine a very small piece of tissue under the microscope to determine the diagnosis and the best treatment for the skin condition. A local anesthetic (numbing medicine)  is injected with a very small needle into the skin area to be tested. A small piece of skin is taken from the area. Sometimes a suture (stitch) is used.     What are the risks of a skin biopsy?  I will experience scar, bleeding, swelling, pain, crusting and redness. I may experience incomplete removal or recurrence. Risks of this procedure are excessive bleeding, bruising, infection, nerve damage, numbness, thick (hypertrophic or keloidal) scar and non-diagnostic biopsy.    How should I care for my wound for the first 24 hours?    Keep the wound dry and covered for 24 hours    If it bleeds, hold direct pressure on the area for 15 minutes. If bleeding does not stop then go to the emergency room    Avoid strenuous exercise the first 1-2 days or as your doctor instructs you    How should I care for the wound after 24 hours?    After 24 hours, remove the bandage    You may bathe or shower as normal    If you had a scalp biopsy, you can shampoo as usual and can use shower water to clean the biopsy site daily    Clean the wound twice a day with gentle soap and water    Do not scrub, be gentle    Apply white petroleum/Vaseline after cleaning the wound with a cotton swab or a clean finger, and keep the site covered with a Bandaid /bandage. Bandages are not necessary with a scalp biopsy    If you are unable to cover the site with a Bandaid /bandage, re-apply ointment 2-3 times a day to keep the site moist. Moisture will help with healing    Avoid strenuous activity for first 1-2 days    Avoid lakes, rivers, pools, and oceans until the stitches are removed or the site is healed    How do I clean my wound?    Wash hands  thoroughly with soap or use hand  before all wound care    Clean the wound with gentle soap and water    Apply white petroleum/Vaseline  to wound after it is clean    Replace the Bandaid /bandage to keep the wound covered for the first few days or as instructed by your doctor    If you had a scalp biopsy, warm shower water to the area on a daily basis should suffice    What should I use to clean my wound?     Cotton-tipped applicators (Qtips )    White petroleum jelly (Vaseline ). Use a clean new container and use Q-tips to apply.    Bandaids   as needed    Gentle soap     How should I care for my wound long term?    Do not get your wound dirty    Keep up with wound care for one week or until the area is healed.    A small scab will form and fall off by itself when the area is completely healed. The area will be red and will become pink in color as it heals. Sun protection is very important for how your scar will turn out. Sunscreen with an SPF 30 or greater is recommended once the area is healed.    You should have some soreness but it should be mild and slowly go away over several days. Talk to your doctor about using tylenol for pain,    When should I call my doctor?  If you have increased:     Pain or swelling    Pus or drainage (clear or slightly yellow drainage is ok)    Temperature over 100F    Spreading redness or warmth around wound    When will I hear about my results?  The biopsy results can take 2-3 weeks to come back. The clinic will call you with the results, send you a clipkitt message, or have you schedule a follow-up clinic or phone time to discuss the results. Contact our clinics if you do not hear from us in 3 weeks.     Who should I call with questions?    [facility]: [phone]     [facility]: [phone]    For urgent needs outside of business hours call the [facility] at [phone] and ask for the dermatology  resident on call              Follow-ups after your visit        Your next 10 appointments already scheduled     Jackson 15, 2017 11:15 AM CDT   Return Visit with Loren Felipe MD   [facility] ([facility])    [id number] [place] [address]   [phone]            Jun 19, 2017  8:30 AM CDT   Ortho Treatment with Radha Preston, PT   [facility] ([facility])    [address] [place] [address] [address] [place] [address]   [phone]            Jun 20, 2017  1:30 PM CDT   Level 2 with NL INFUSION CHAIR 3   [facility] ([facility])    [address] [place] [address] [address] [place] [address]   [phone]            Jun 22, 2017  1:00 PM CDT   Ortho Treatment with Radha Preston, PT   [facility] ([facility])    [address] [place] [address] [address] [place] [address]   [phone]            Jun 27, 2017  1:30 PM CDT   Ortho Treatment with Radha Preston PT   [facility] ([facility])    [address] [place] [address] [place] [address]   [phone]            Jun 29, 2017 10:15 AM CDT   Return Visit with Loren Felipe MD   [facility] ([facility])    [address] [place] [address]   [phone]            Jun 29, 2017  1:00 PM CDT   Ortho Treatment with Radha Preston PT   [facility] ([facility])    [address] [facility] [address] [place] [address]   [phone]            Jul 03, 2017  1:30 PM CDT   Ortho Treatment with Radha Preston PT   [facility] ([facility])    [address] [facility] [address] [place] [address]   [phone]            Jul 06, 2017  1:00 PM CDT   Ortho Treatment with Radha Preston, PT   [facility] ([facility])    [address] [facility] [address] [place] [address]   [phone]            Jul 10, 2017   1:30 PM CDT   Ortho Treatment with Radha Preston PT   [facility] ([facility])    [address] [place] [address] [phone]              Who to contact     If you have questions or need follow up information about today's clinic visit or your schedule please contact [facility] directly at [phone].  Normal or non-critical lab and imaging results will be communicated to you by Thing Labshart, letter or phone within 4 business days after the clinic has received the results. If you do not hear from us within 7 days, please contact the clinic through Thing Labshart or phone. If you have a critical or abnormal lab result, we will notify you by phone as soon as possible.  Submit refill requests through Cody or call your pharmacy and they will forward the refill request to us. Please allow 3 business days for your refill to be completed.          Additional Information About Your Visit        Thing LabsharAlta Analog Information     Cody gives you secure access to your electronic health record. If you see a primary care provider, you can also send messages to your care team and make appointments. If you have questions, please call your primary care clinic.  If you do not have a primary care provider, please call [phone] and they will assist you.      Cody is an electronic gateway that provides easy, online access to your medical records. With Cody, you can request a clinic appointment, read your test results, renew a prescription or communicate with your care team.     To access your existing account, please contact your [facility] or call [phone] for assistance.        Care EveryWhere ID     This is your Care EveryWhere ID. This could be used by other organizations to access your [place] medical records  [id number]         Blood Pressure from Last 3 Encounters:   05/30/17 145/79   05/30/17 138/88   05/09/17 137/66     Weight from Last 3 Encounters:   05/30/17 64.5 kg (142 lb 1.6 oz)   05/09/17 63.5 kg (140 lb)   05/09/17 63.5 kg (140 lb)              We Performed the Following     BIOPSY SKIN/SUBQ/MUC MEM, SINGLE LESION     DESTRUCT PREMALIGNANT LESION, 2-14     DESTRUCT PREMALIGNANT LESION, FIRST     Surgical pathology exam        Primary Care Provider Office Phone # Fax #    Phani Tapia PA-C [phone]       [facility] [address] [facility] [address]        Thank you!     Thank you for choosing [facility]  for your care. Our goal is always to provide you with excellent care. Hearing back from our patients is one way we can continue to improve our services. Please take a few minutes to complete the written survey that you may receive in the mail after your visit with us. Thank you!             Your Updated Medication List - Protect others around you: Learn how to safely use, store and throw away your medicines at www.disposemymeds.org.          This list is accurate as of: 6/15/17 11:08 AM.  Always use your most recent med list.                   Brand Name Dispense Instructions for use    * FLUoxetine 20 MG capsule    PROzac    270 capsule    TAKE THREE CAPSULES BY MOUTH EVERY DAY TITRATE AS DIRECTED       * FLUoxetine 20 MG capsule    PROzac    270 capsule    TAKE THREE CAPSULES BY MOUTH EVERY DAY TITRATE AS DIRECTED       hydrochlorothiazide 25 MG tablet    HYDRODIURIL    90 tablet    Take 1 tablet (25 mg) by mouth daily       LORazepam 0.5 MG tablet    ATIVAN    15 tablet    Take 1 tablet (0.5 mg) by mouth every 4 hours as needed (Anxiety, Nausea/Vomiting or Sleep)       nitrofurantoin (macrocrystal-monohydrate) 100 MG capsule    MACROBID    14 capsule    Take 1 capsule (100 mg) by mouth 2 times daily       oxybutynin 5 MG tablet    DITROPAN    60 tablet    Take 0.5 tablets (2.5 mg) by mouth 2 times daily       priLOSEC 20 MG CR capsule   Generic drug:  omeprazole      Take  by mouth daily       simvastatin 20 MG tablet    ZOCOR    90 tablet    Take 1 tablet (20 mg) by mouth At Bedtime       TYLENOL EX ST ARTHRITIS PAIN 500 MG tablet   Generic drug:  acetaminophen      Take 500-1,000 mg by mouth every 6 hours as needed for mild pain       venlafaxine 37.5 MG 24 hr capsule    EFFEXOR XR    90 capsule    Take 1 capsule (37.5 mg) by mouth daily       * Notice:  This list has 2 medication(s) that are the same as other medications prescribed for you. Read the directions carefully, and ask your doctor or other care provider to review them with you.

## 2017-06-20 ENCOUNTER — MYC MEDICAL ADVICE (OUTPATIENT)
Dept: FAMILY MEDICINE | Facility: OTHER | Age: 67
End: 2017-06-20

## 2017-06-20 ENCOUNTER — DOCUMENTATION ONLY (OUTPATIENT)
Dept: SPIRITUAL SERVICES | Facility: CLINIC | Age: 67
End: 2017-06-20

## 2017-06-20 ENCOUNTER — INFUSION THERAPY VISIT (OUTPATIENT)
Dept: INFUSION THERAPY | Facility: CLINIC | Age: 67
End: 2017-06-20
Attending: INTERNAL MEDICINE
Payer: MEDICARE

## 2017-06-20 VITALS
HEART RATE: 72 BPM | BODY MASS INDEX: 22.92 KG/M2 | SYSTOLIC BLOOD PRESSURE: 134 MMHG | DIASTOLIC BLOOD PRESSURE: 70 MMHG | WEIGHT: 142 LBS

## 2017-06-20 DIAGNOSIS — C50.412 MALIGNANT NEOPLASM OF UPPER-OUTER QUADRANT OF LEFT FEMALE BREAST (H): ICD-10-CM

## 2017-06-20 DIAGNOSIS — R30.0 DYSURIA: Primary | ICD-10-CM

## 2017-06-20 DIAGNOSIS — Z51.11 ENCOUNTER FOR ANTINEOPLASTIC CHEMOTHERAPY: Primary | ICD-10-CM

## 2017-06-20 PROCEDURE — 25000128 H RX IP 250 OP 636: Performed by: INTERNAL MEDICINE

## 2017-06-20 PROCEDURE — 96413 CHEMO IV INFUSION 1 HR: CPT

## 2017-06-20 RX ORDER — HEPARIN SODIUM (PORCINE) LOCK FLUSH IV SOLN 100 UNIT/ML 100 UNIT/ML
500 SOLUTION INTRAVENOUS EVERY 8 HOURS
Status: CANCELLED
Start: 2017-06-20

## 2017-06-20 RX ORDER — HEPARIN SODIUM (PORCINE) LOCK FLUSH IV SOLN 100 UNIT/ML 100 UNIT/ML
500 SOLUTION INTRAVENOUS EVERY 8 HOURS
Status: DISCONTINUED | OUTPATIENT
Start: 2017-06-20 | End: 2017-06-20 | Stop reason: HOSPADM

## 2017-06-20 RX ADMIN — TRASTUZUMAB 382 MG: KIT at 14:15

## 2017-06-20 RX ADMIN — SODIUM CHLORIDE, PRESERVATIVE FREE 500 UNITS: 5 INJECTION INTRAVENOUS at 15:01

## 2017-06-20 NOTE — MR AVS SNAPSHOT
After Visit Summary   6/20/2017    Michaela Dorman    MRN: [id number]           Patient Information     Date Of Birth          1950        Visit Information        Provider Department      6/20/2017 1:30 PM NL INFUSION CHAIR 3 [facility]        Today's Diagnoses     Encounter for antineoplastic chemotherapy    -  1    Malignant neoplasm of upper-outer quadrant of left female breast (H)          Care Instructions    Pt to return on 7/11/17  for Herceptin, labs. Copies of medication list and upcoming appointments given prior to discharge.             Follow-ups after your visit        Your next 10 appointments already scheduled     Jun 21, 2017  9:15 AM CDT   LAB with NL LAB [facility] ([facility])    [address]  [facility] [id number] [phone]           Patient must bring picture ID.  Patient should be prepared to give a urine specimen  Please do not eat 10-12 hours before your appointment if you are coming in fasting for labs on lipids, cholesterol, or glucose (sugar).  Pregnant women should follow their Care Team instructions. Water with medications is okay. Do not drink coffee or other fluids.   If you have concerns about taking  your medications, please ask at office or if scheduling via Smart Voicemail, send a message by clicking on Secure Messaging, Message Your Care Team.            Jun 22, 2017  1:00 PM CDT   Ortho Treatment with Radha Preston PT   [facility] ([facility])    [address] [place] [address] [place] [address]   [phone]            Jun 27, 2017  1:30 PM CDT   Ortho Treatment with Radha Preston PT   [facility] ([facility])    [address] [place] [address] [place] [address]   [phone]            Jun 29, 2017 10:15 AM CDT   Return Visit with Loren Felipe MD   [facility]    [address] [place] [address]  [place] [address]   [phone]            Jun 29, 2017  1:00 PM CDT   Ortho Treatment with Radha Preston, PT   [facility] ([facility])    [address] [place] [address]   [phone]            Jul 03, 2017  1:30 PM CDT   Ortho Treatment with Radha Preston, PT   [facility] ([facility])    [address] [place] [address]   [phone]            Jul 06, 2017  1:00 PM CDT   Ortho Treatment with Radha Preston, PT   [facility] ([facility])    [address] [place] [address]   [phone]            Jul 10, 2017  1:30 PM CDT   Ortho Treatment with Radha Preston, PT   [facility] ([facility])    [address] [place] [address]   [phone]            Jul 11, 2017 11:30 AM CDT   Return Visit with Syeda Ferguson MD   [facility]    [address] [facility]  [place] [address]   [phone]            Jul 11, 2017 12:30 PM CDT   Level 2 with NL INFUSION CHAIR 4   [facility] ([facility])    [address] [place] [address] [address] [place] [address]   [phone]              Future tests that were ordered for you today     Open Future Orders        Priority Expected Expires Ordered    *UA reflex to Microscopic and Culture ([place] [facility] (except [place] and [place]) Routine  6/20/2018 6/20/2017            Who to contact     If you have questions or need follow up information about today's clinic visit or your schedule please contact [facility] directly at [phone].  Normal or non-critical lab and imaging results will be communicated to you by MyChart, letter or phone within 4 business days after the clinic has received the results. If you do not hear from us within 7 days, please contact the clinic  through StockLayouts or phone. If you have a critical or abnormal lab result, we will notify you by phone as soon as possible.  Submit refill requests through StockLayouts or call your pharmacy and they will forward the refill request to us. Please allow 3 business days for your refill to be completed.          Additional Information About Your Visit        [email or web address] Information     StockLayouts gives you secure access to your electronic health record. If you see a primary care provider, you can also send messages to your care team and make appointments. If you have questions, please call your primary care clinic.  If you do not have a primary care provider, please call [phone] and they will assist you.        Care EveryWhere ID     This is your Care EveryWhere ID. This could be used by other organizations to access your [place] medical records  [id number]        Your Vitals Were     Pulse BMI (Body Mass Index)                72 22.92 kg/m2           Blood Pressure from Last 3 Encounters:   06/20/17 134/70   05/30/17 145/79   05/30/17 138/88    Weight from Last 3 Encounters:   06/20/17 64.4 kg (142 lb)   05/30/17 64.5 kg (142 lb 1.6 oz)   05/09/17 63.5 kg (140 lb)              Today, you had the following     No orders found for display       Primary Care Provider Office Phone # Fax #    Phani Tapia PA-C [phone]       [facility] [address] [place] [address]        Thank you!     Thank you for choosing [facility]  for your care. Our goal is always to provide you with excellent care. Hearing back from our patients is one way we can continue to improve our services. Please take a few minutes to complete the written survey that you may receive in the mail after your visit with us. Thank you!             Your Updated Medication List - Protect others around you: Learn how to safely use, store and throw away your medicines at www.disposemymeds.org.          This list  is accurate as of: 6/20/17  4:18 PM.  Always use your most recent med list.                   Brand Name Dispense Instructions for use    * FLUoxetine 20 MG capsule    PROzac    270 capsule    TAKE THREE CAPSULES BY MOUTH EVERY DAY TITRATE AS DIRECTED       * FLUoxetine 20 MG capsule    PROzac    270 capsule    TAKE THREE CAPSULES BY MOUTH EVERY DAY TITRATE AS DIRECTED       hydrochlorothiazide 25 MG tablet    HYDRODIURIL    90 tablet    Take 1 tablet (25 mg) by mouth daily       LORazepam 0.5 MG tablet    ATIVAN    15 tablet    Take 1 tablet (0.5 mg) by mouth every 4 hours as needed (Anxiety, Nausea/Vomiting or Sleep)       nitrofurantoin (macrocrystal-monohydrate) 100 MG capsule    MACROBID    14 capsule    Take 1 capsule (100 mg) by mouth 2 times daily       oxybutynin 5 MG tablet    DITROPAN    60 tablet    Take 0.5 tablets (2.5 mg) by mouth 2 times daily       priLOSEC 20 MG CR capsule   Generic drug:  omeprazole      Take by mouth daily       simvastatin 20 MG tablet    ZOCOR    90 tablet    Take 1 tablet (20 mg) by mouth At Bedtime       TYLENOL EX ST ARTHRITIS PAIN 500 MG tablet   Generic drug:  acetaminophen      Take 500-1,000 mg by mouth every 6 hours as needed for mild pain       venlafaxine 37.5 MG 24 hr capsule    EFFEXOR XR    90 capsule    Take 1 capsule (37.5 mg) by mouth daily       * Notice:  This list has 2 medication(s) that are the same as other medications prescribed for you. Read the directions carefully, and ask your doctor or other care provider to review them with you.

## 2017-06-20 NOTE — PATIENT INSTRUCTIONS
Pt to return on 7/11/17  for Herceptin, labs. Copies of medication list and upcoming appointments given prior to discharge.

## 2017-06-20 NOTE — PROGRESS NOTES
Here for chemotherapy, tolerated well.  BSA/Dosing verified by RN x2. Positive blood return noted pre and post chemotherapy administration. Discharged in stable condition.

## 2017-06-20 NOTE — PROGRESS NOTES
"SPIRITUAL HEALTH SERVICES  SPIRITUAL ASSESSMENT Progress Note  [facility]      (Follow up)  Pt reported her , Yefri, was way ahead of schedule in his recovery from his accident.  She said she was doing well, but wished she wouldn't get fatigued so easily, because she had so many things she wanted to do.   validated her feelings and provided a prayer.  Rounding - Pt remarked, \"They're all great!\"   is available for pt/family needs.    Ernesto Melendrez M.Div., [facility]  Staff   Office tel: [phone]    "

## 2017-06-21 ENCOUNTER — MYC MEDICAL ADVICE (OUTPATIENT)
Dept: FAMILY MEDICINE | Facility: OTHER | Age: 67
End: 2017-06-21

## 2017-06-21 DIAGNOSIS — N30.00 ACUTE CYSTITIS WITHOUT HEMATURIA: ICD-10-CM

## 2017-06-21 DIAGNOSIS — R82.90 NONSPECIFIC FINDING ON EXAMINATION OF URINE: Primary | ICD-10-CM

## 2017-06-21 DIAGNOSIS — C50.412 MALIGNANT NEOPLASM OF UPPER-OUTER QUADRANT OF LEFT FEMALE BREAST (H): ICD-10-CM

## 2017-06-21 DIAGNOSIS — R30.0 DYSURIA: ICD-10-CM

## 2017-06-21 LAB
ALBUMIN UR-MCNC: ABNORMAL MG/DL
APPEARANCE UR: CLEAR
BACTERIA #/AREA URNS HPF: ABNORMAL /HPF
BILIRUB UR QL STRIP: NEGATIVE
COLOR UR AUTO: YELLOW
GLUCOSE UR STRIP-MCNC: NEGATIVE MG/DL
HGB UR QL STRIP: NEGATIVE
HYALINE CASTS #/AREA URNS LPF: ABNORMAL /LPF (ref 0–2)
KETONES UR STRIP-MCNC: NEGATIVE MG/DL
LEUKOCYTE ESTERASE UR QL STRIP: NEGATIVE
MUCOUS THREADS #/AREA URNS LPF: PRESENT /LPF
NITRATE UR QL: POSITIVE
PH UR STRIP: 6.5 PH (ref 5–7)
RBC #/AREA URNS AUTO: ABNORMAL /HPF (ref 0–2)
SP GR UR STRIP: 1.01 (ref 1–1.03)
URN SPEC COLLECT METH UR: ABNORMAL
UROBILINOGEN UR STRIP-ACNC: 1 EU/DL (ref 0.2–1)
WBC #/AREA URNS AUTO: ABNORMAL /HPF (ref 0–2)

## 2017-06-21 PROCEDURE — 87086 URINE CULTURE/COLONY COUNT: CPT | Performed by: PHYSICIAN ASSISTANT

## 2017-06-21 PROCEDURE — 81001 URINALYSIS AUTO W/SCOPE: CPT | Performed by: PHYSICIAN ASSISTANT

## 2017-06-21 RX ORDER — CIPROFLOXACIN 500 MG/1
500 TABLET, FILM COATED ORAL 2 TIMES DAILY
Qty: 14 TABLET | Refills: 0 | Status: SHIPPED | OUTPATIENT
Start: 2017-06-21 | End: 2017-08-01

## 2017-06-23 LAB
BACTERIA SPEC CULT: NORMAL
MICRO REPORT STATUS: NORMAL
SPECIMEN SOURCE: NORMAL

## 2017-06-24 DIAGNOSIS — R30.0 DYSURIA: ICD-10-CM

## 2017-06-26 RX ORDER — OXYBUTYNIN CHLORIDE 5 MG/1
TABLET ORAL
Qty: 90 TABLET | Refills: 0 | Status: SHIPPED | OUTPATIENT
Start: 2017-06-26 | End: 2018-02-16

## 2017-06-26 NOTE — TELEPHONE ENCOUNTER
Oxybutynin      Last Written Prescription Date: 4/24/17  Last Fill Quantity: 60,  # refills: 0   Last Office Visit with G, P or [facility] prescribing provider: 5/9/17                                         Next 5 appointments (look out 90 days)     Jun 29, 2017 10:15 AM CDT   Return Visit with Loren Felipe MD   [facility] ([facility])    [address] [place] [address] [phone]            Jul 11, 2017 11:30 AM CDT   Return Visit with Syeda Ferguson MD   [facility] ([facility])    [address] [facility] [address] [phone]

## 2017-06-26 NOTE — TELEPHONE ENCOUNTER
Prescription approved per [facility] Refill Protocol.    Dotty Barriga RN  [facility]

## 2017-06-29 ENCOUNTER — HOSPITAL ENCOUNTER (OUTPATIENT)
Dept: PHYSICAL THERAPY | Facility: CLINIC | Age: 67
Setting detail: THERAPIES SERIES
End: 2017-06-29
Attending: PHYSICIAN ASSISTANT
Payer: MEDICARE

## 2017-06-29 PROCEDURE — 97140 MANUAL THERAPY 1/> REGIONS: CPT | Mod: GP | Performed by: PHYSICAL THERAPIST

## 2017-06-29 PROCEDURE — 40000718 ZZHC STATISTIC PT DEPARTMENT ORTHO VISIT: Performed by: PHYSICAL THERAPIST

## 2017-06-29 PROCEDURE — 97110 THERAPEUTIC EXERCISES: CPT | Mod: GP | Performed by: PHYSICAL THERAPIST

## 2017-07-03 ENCOUNTER — MYC MEDICAL ADVICE (OUTPATIENT)
Dept: DERMATOLOGY | Facility: CLINIC | Age: 67
End: 2017-07-03

## 2017-07-05 DIAGNOSIS — E78.5 HYPERLIPIDEMIA WITH TARGET LDL LESS THAN 130: ICD-10-CM

## 2017-07-05 NOTE — TELEPHONE ENCOUNTER
Lost specimen patien    Please, call patient and schedule rebiopsy. I offered her mohs versus rebiopsy versus exesion and she wants rebiopsy.       I notified the patient that organization is taking the loss seriously and investigating what occurred, that we will give them additional f/u when we know more about what happened, and that the billing will be appropriately adjusted.    She had no further questions or concerns.

## 2017-07-05 NOTE — TELEPHONE ENCOUNTER
Writer spoke with patient who states the biopsy site is still healing. Due to this rebiopsy was scheduled out a few weeks, July 18, 17, at 10:15 am with Dr. Felipe. No further questions or concerns per patient at this time    Hilda Eli LPN

## 2017-07-06 RX ORDER — SIMVASTATIN 20 MG
TABLET ORAL
Qty: 30 TABLET | Refills: 0 | Status: SHIPPED | OUTPATIENT
Start: 2017-07-06 | End: 2017-10-24 | Stop reason: ALTCHOICE

## 2017-07-06 NOTE — TELEPHONE ENCOUNTER
Routing refill request to provider for review/approval because:  Labs out of range:  FLP  Labs not current:  JOSE Barriga RN  [facility]

## 2017-07-06 NOTE — TELEPHONE ENCOUNTER
Simvastatin     Last Written Prescription Date: 9/7/16  Last Fill Quantity: 90, # refills: 1  Last Office Visit with G, P or [facility] prescribing provider: 5/9/17  Next 5 appointments (look out 90 days)     Jul 11, 2017 11:30 AM CDT   Return Visit with Syeda Ferguson MD   [facility] ([facility])    [address] [place] [address]   [phone]            Jul 18, 2017 10:15 AM CDT   Return Visit with Loren Felipe MD   [facility] ([facility])    [address] [place] [address] [phone]                   Lab Results   Component Value Date    CHOL 218 08/02/2010     Lab Results   Component Value Date    HDL 72 08/02/2010     Lab Results   Component Value Date     08/31/2016     08/02/2010     Lab Results   Component Value Date    TRIG 125 08/02/2010     Lab Results   Component Value Date    CHOLHDLRATIO 3.0 08/02/2010

## 2017-07-11 ENCOUNTER — ONCOLOGY VISIT (OUTPATIENT)
Dept: ONCOLOGY | Facility: CLINIC | Age: 67
End: 2017-07-11
Payer: COMMERCIAL

## 2017-07-11 ENCOUNTER — INFUSION THERAPY VISIT (OUTPATIENT)
Dept: INFUSION THERAPY | Facility: CLINIC | Age: 67
End: 2017-07-11
Attending: INTERNAL MEDICINE
Payer: MEDICARE

## 2017-07-11 VITALS — SYSTOLIC BLOOD PRESSURE: 130 MMHG | DIASTOLIC BLOOD PRESSURE: 74 MMHG | HEART RATE: 83 BPM

## 2017-07-11 VITALS
RESPIRATION RATE: 16 BRPM | HEIGHT: 66 IN | BODY MASS INDEX: 22.9 KG/M2 | DIASTOLIC BLOOD PRESSURE: 73 MMHG | SYSTOLIC BLOOD PRESSURE: 126 MMHG | TEMPERATURE: 97.3 F | HEART RATE: 94 BPM | OXYGEN SATURATION: 94 % | WEIGHT: 142.5 LBS

## 2017-07-11 DIAGNOSIS — Z92.21 STATUS POST CHEMOTHERAPY: ICD-10-CM

## 2017-07-11 DIAGNOSIS — F43.21 ADJUSTMENT DISORDER WITH DEPRESSED MOOD: ICD-10-CM

## 2017-07-11 DIAGNOSIS — Z51.11 ENCOUNTER FOR ANTINEOPLASTIC CHEMOTHERAPY: Primary | ICD-10-CM

## 2017-07-11 DIAGNOSIS — C50.412 MALIGNANT NEOPLASM OF UPPER-OUTER QUADRANT OF LEFT FEMALE BREAST (H): ICD-10-CM

## 2017-07-11 DIAGNOSIS — C50.412 MALIGNANT NEOPLASM OF UPPER-OUTER QUADRANT OF LEFT FEMALE BREAST, UNSPECIFIED ESTROGEN RECEPTOR STATUS (H): Primary | ICD-10-CM

## 2017-07-11 DIAGNOSIS — I10 ESSENTIAL HYPERTENSION: ICD-10-CM

## 2017-07-11 DIAGNOSIS — Z51.11 ENCOUNTER FOR ANTINEOPLASTIC CHEMOTHERAPY: ICD-10-CM

## 2017-07-11 DIAGNOSIS — C50.412 MALIGNANT NEOPLASM OF UPPER-OUTER QUADRANT OF LEFT FEMALE BREAST, UNSPECIFIED ESTROGEN RECEPTOR STATUS (H): ICD-10-CM

## 2017-07-11 DIAGNOSIS — E78.5 HYPERLIPIDEMIA LDL GOAL <130: ICD-10-CM

## 2017-07-11 PROCEDURE — 96413 CHEMO IV INFUSION 1 HR: CPT

## 2017-07-11 PROCEDURE — 99215 OFFICE O/P EST HI 40 MIN: CPT | Performed by: INTERNAL MEDICINE

## 2017-07-11 PROCEDURE — 25000128 H RX IP 250 OP 636: Performed by: INTERNAL MEDICINE

## 2017-07-11 RX ORDER — SODIUM CHLORIDE 9 MG/ML
1000 INJECTION, SOLUTION INTRAVENOUS CONTINUOUS PRN
Status: CANCELLED
Start: 2017-07-11

## 2017-07-11 RX ORDER — ALBUTEROL SULFATE 0.83 MG/ML
2.5 SOLUTION RESPIRATORY (INHALATION)
Status: CANCELLED | OUTPATIENT
Start: 2017-07-11

## 2017-07-11 RX ORDER — HEPARIN SODIUM (PORCINE) LOCK FLUSH IV SOLN 100 UNIT/ML 100 UNIT/ML
500 SOLUTION INTRAVENOUS EVERY 8 HOURS
Status: DISCONTINUED | OUTPATIENT
Start: 2017-07-11 | End: 2017-07-11 | Stop reason: HOSPADM

## 2017-07-11 RX ORDER — ACETAMINOPHEN 325 MG/1
650 TABLET ORAL
Status: CANCELLED
Start: 2017-07-11

## 2017-07-11 RX ORDER — PROCHLORPERAZINE MALEATE 5 MG
10 TABLET ORAL EVERY 6 HOURS PRN
Status: CANCELLED
Start: 2017-07-11

## 2017-07-11 RX ORDER — LORAZEPAM 2 MG/ML
.5-1 INJECTION INTRAMUSCULAR EVERY 6 HOURS PRN
Status: CANCELLED | OUTPATIENT
Start: 2017-07-11

## 2017-07-11 RX ORDER — DIPHENHYDRAMINE HYDROCHLORIDE 50 MG/ML
50 INJECTION INTRAMUSCULAR; INTRAVENOUS
Status: CANCELLED
Start: 2017-07-11

## 2017-07-11 RX ORDER — LORAZEPAM 0.5 MG/1
.5-1 TABLET ORAL EVERY 6 HOURS PRN
Status: CANCELLED
Start: 2017-07-11

## 2017-07-11 RX ORDER — HEPARIN SODIUM (PORCINE) LOCK FLUSH IV SOLN 100 UNIT/ML 100 UNIT/ML
500 SOLUTION INTRAVENOUS EVERY 8 HOURS
Status: CANCELLED
Start: 2017-07-11

## 2017-07-11 RX ORDER — MEPERIDINE HYDROCHLORIDE 25 MG/ML
25 INJECTION INTRAMUSCULAR; INTRAVENOUS; SUBCUTANEOUS EVERY 30 MIN PRN
Status: CANCELLED | OUTPATIENT
Start: 2017-07-11

## 2017-07-11 RX ORDER — ALBUTEROL SULFATE 90 UG/1
1-2 AEROSOL, METERED RESPIRATORY (INHALATION)
Status: CANCELLED
Start: 2017-07-11

## 2017-07-11 RX ORDER — METHYLPREDNISOLONE SODIUM SUCCINATE 125 MG/2ML
125 INJECTION, POWDER, LYOPHILIZED, FOR SOLUTION INTRAMUSCULAR; INTRAVENOUS
Status: CANCELLED
Start: 2017-07-11

## 2017-07-11 RX ADMIN — SODIUM CHLORIDE, PRESERVATIVE FREE 500 UNITS: 5 INJECTION INTRAVENOUS at 13:30

## 2017-07-11 RX ADMIN — TRASTUZUMAB 382 MG: KIT at 12:44

## 2017-07-11 RX ADMIN — SODIUM CHLORIDE 250 ML: 9 INJECTION, SOLUTION INTRAVENOUS at 12:41

## 2017-07-11 ASSESSMENT — PAIN SCALES - GENERAL: PAINLEVEL: NO PAIN (0)

## 2017-07-11 NOTE — MR AVS SNAPSHOT
After Visit Summary   7/11/2017    Michaela Dorman    MRN: [id number]           Patient Information     Date Of Birth          1950        Visit Information        Provider Department      7/11/2017 11:30 AM Syeda Ferguson MD [facility]        Today's Diagnoses     Malignant neoplasm of upper-outer quadrant of left female breast, unspecified estrogen receptor status (H)    -  1    Essential hypertension        Hyperlipidemia LDL goal <130        Adjustment disorder with depressed mood        Status post chemotherapy        Encounter for antineoplastic chemotherapy          Care Instructions    Please follow up with Dr. Ferguson in 6 weeks.  You will need labs and a MUGA scan prior to your appointment.    PORT Lab appointment date/time: 8/16/2017 @ 12:30am                                     TO BE DRAWN IN INFUSION!    Scan appointment date/time: 8/16/2017 @ 1:00pm                          Follow up appointment date/time: 8/22/2017 @ 11:30am    Infusion appointment date/time: 8/1/2017 @ 1:00pm      If you need to reschedule or have scheduling questions please call scheduling at [phone].    Please contact Radiology Scheduling at [phone] for any Radiology rescheduling questions or concerns.    Keep in mind to have any labs or scans that are needed done prior to your Oncology visit.      If you have any questions or concerns please feel free to call.    Britta Mims, [facility]  Oncology Clinic  [facility]  [phone]                    Follow-ups after your visit        Follow-up notes from your care team     Return in about 6 weeks (around 8/22/2017) for Imaging ordered before next appointment, Schedule for chemotherapy as per treatment plan.      Your next 10 appointments already scheduled     Jul 18, 2017 10:15 AM CDT   Return Visit with Loren Felipe MD   [facility] ([facility])    [address] [address] [place] [address]  [address]   [phone]            Aug 01, 2017  1:00 PM CDT   Level 2 with NL INFUSION CHAIR 4   [facility] ([facility])    [address] [place] [address]  [place] [address]   [phone]            Aug 16, 2017 12:30 PM CDT   LAB with Phlab   [facility] ([facility])    [address] [place] [address]   [phone]            Aug 16, 2017  1:00 PM CDT   NM HEART MUGA REST with PHNM1   [facility] ([facility])    [address] [place] [address]   [phone]           Please bring a list of your medicines to the exam. (Include vitamins, minerals and over-the-counter drugs.) You should wear comfortable clothes. Leave your valuables at home. Please bring related prior results and films.  Tell your doctor:   If you are breastfeeding or may be pregnant.   If you have had a barium test within the past few days. Barium may change the results of certain exams.   If you think you may need sedation (medicine to help you relax).  You may eat and drink as normal.  Please call your Imaging Department at your exam site with any questions.            Aug 22, 2017 11:30 AM CDT   [facility] Visit with Syeda Ferguson MD   [facility]    [address] [place] [address]   [phone]            Dec 19, 2017 12:30 PM CST   [facility] Visit with [facility]ipe MD   [facility]    [address] [place] [address]   [phone]              Future tests that were ordered for you today     Open Future Orders        Priority Expected Expires Ordered    NM MUGA rest test Routine  7/11/2018 7/11/2017    CBC with platelets differential Routine 8/15/2017 7/11/2018 7/11/2017    Comprehensive metabolic panel Routine 8/15/2017 7/11/2018 7/11/2017    Ca27.29  breast tumor marker Routine 8/15/2017  "7/11/2018 7/11/2017            Who to contact     If you have questions or need follow up information about today's clinic visit or your schedule please contact [facility] directly at [phone].  Normal or non-critical lab and imaging results will be communicated to you by MyChart, letter or phone within 4 business days after the clinic has received the results. If you do not hear from us within 7 days, please contact the clinic through MyChart or phone. If you have a critical or abnormal lab result, we will notify you by phone as soon as possible.  Submit refill requests through [employer] or call your pharmacy and they will forward the refill request to us. Please allow 3 business days for your refill to be completed.          Additional Information About Your Visit        Enomalyhart Information     [employer] gives you secure access to your electronic health record. If you see a primary care provider, you can also send messages to your care team and make appointments. If you have questions, please call your primary care clinic.  If you do not have a primary care provider, please call [phone] and they will assist you.        Care EveryWhere ID     This is your Care EveryWhere ID. This could be used by other organizations to access your [facility] medical records  [id number]        Your Vitals Were     Pulse Temperature Respirations Height Pulse Oximetry BMI (Body Mass Index)    94 97.3  F (36.3  C) (Temporal) 16 1.676 m (5' 6\") 94% 23 kg/m2       Blood Pressure from Last 3 Encounters:   07/11/17 126/73   06/20/17 134/70   05/30/17 145/79    Weight from Last 3 Encounters:   07/11/17 64.6 kg (142 lb 8 oz)   06/20/17 64.4 kg (142 lb)   05/30/17 64.5 kg (142 lb 1.6 oz)               Primary Care Provider Office Phone # Fax #    Phani Tapia PA-C [phone]       [facility] [address] [facility] [address]        Equal Access to Services     SHIVANI ARTIS AH: Hadii santa adames " mimi Manzano, walesleyda luqadaha, qaybta kaalmada padmini, wale winter darianmae lillyveronica laAshutoshwarren becky. So [facility] [phone].    ATENCIÓN: Si habla noel, tiene a ordoñez disposición servicios gratuitos de asistencia lingüística. Shaneka al [phone].    We comply with applicable federal civil rights laws and [place] laws. We do not discriminate on the basis of race, color, national origin, age, disability sex, sexual orientation or gender identity.            Thank you!     Thank you for choosing [facility]  for your care. Our goal is always to provide you with excellent care. Hearing back from our patients is one way we can continue to improve our services. Please take a few minutes to complete the written survey that you may receive in the mail after your visit with us. Thank you!             Your Updated Medication List - Protect others around you: Learn how to safely use, store and throw away your medicines at www.disposemymeds.org.          This list is accurate as of: 7/11/17 12:46 PM.  Always use your most recent med list.                   Brand Name Dispense Instructions for use Diagnosis    ciprofloxacin 500 MG tablet    CIPRO    14 tablet    Take 1 tablet (500 mg) by mouth 2 times daily    Dysuria, Malignant neoplasm of upper-outer quadrant of left female breast (H), Nonspecific finding on examination of urine, Acute cystitis without hematuria       * FLUoxetine 20 MG capsule    PROzac    270 capsule    TAKE THREE CAPSULES BY MOUTH EVERY DAY TITRATE AS DIRECTED    Adjustment disorder with depressed mood       * FLUoxetine 20 MG capsule    PROzac    270 capsule    TAKE THREE CAPSULES BY MOUTH EVERY DAY TITRATE AS DIRECTED    Adjustment disorder with depressed mood       hydrochlorothiazide 25 MG tablet    HYDRODIURIL    90 tablet    Take 1 tablet (25 mg) by mouth daily    Essential hypertension       LORazepam 0.5 MG tablet    ATIVAN    15 tablet    Take 1 tablet (0.5 mg) by mouth every 4  hours as needed (Anxiety, Nausea/Vomiting or Sleep)    Malignant neoplasm of upper-outer quadrant of left female breast (H), Encounter for antineoplastic chemotherapy       nitroFURantoin (macrocrystal-monohydrate) 100 MG capsule    MACROBID    14 capsule    Take 1 capsule (100 mg) by mouth 2 times daily    Acute cystitis without hematuria       oxybutynin 5 MG tablet    DITROPAN    90 tablet    TAKE ONE-HALF TABLET BY MOUTH TWICE A DAY    Dysuria       priLOSEC 20 MG CR capsule   Generic drug:  omeprazole      Take by mouth daily        simvastatin 20 MG tablet    ZOCOR    30 tablet    TAKE ONE TABLET BY MOUTH AT BEDTIME    Hyperlipidemia with target LDL less than 130       TYLENOL EX ST ARTHRITIS PAIN 500 MG tablet   Generic drug:  acetaminophen      Take 500-1,000 mg by mouth every 6 hours as needed for mild pain    Dermatitis due to sun       venlafaxine 37.5 MG 24 hr capsule    EFFEXOR XR    90 capsule    Take 1 capsule (37.5 mg) by mouth daily    Adjustment disorder with depressed mood       * Notice:  This list has 2 medication(s) that are the same as other medications prescribed for you. Read the directions carefully, and ask your doctor or other care provider to review them with you.

## 2017-07-11 NOTE — PROGRESS NOTES
Hematology/ Oncology Follow-up Visit:  Jul 11, 2017    Reason for Visit:   Chief Complaint   Patient presents with     Oncology Clinic Visit     6 week follow up for Malignant neoplasm of upper-outer quadrant of left female breast     Chemotherapy     C8D1 today        Oncologic History:  Malignant neoplasm of upper-outer quadrant of left female breast (H)  Michaela Dorman  initially felt a mass in her left axilla. She then underwent mammogram which showed dense breasts with no suspicious lesions in 4/2016. She was then seen by surgery and underwent excisional LN biopsy that showed metastatic high-grade poorly differentiated carcinoma with a tumor size of 2.5 cm. Immunohistochemistry was done for ER/OH receptors that came back both negative. HER-2/boubacar was amplified by FISH. She then underwent MRI of the breast on 9/26/2016 that showed suspicious abnormality with multicentric left sided breast cancer that involved the left lateral breast form the nipple to the chest wall. A PET scan was obtained on 10/5/2016 that showed a hypermetabolic opacity in the left axilla wihtout other pathological activity. It was then recommended that she undergo neoadjuvant chemotherapy with Cytoxan and Doxorubicin that will be followed by Taxol, Herceptin. Patient initiated treatment on 10/11/2016.     Interval History:  Patient is here today for follow-up visit. She has been tolerating treatment with Herceptin without significant side effects. Her main complaint currently is fatigue. She denies any weight loss or fever or chills. Denies any shortness of breath or cough or wheezing. Denies any pain.    Review Of Systems:  Constitutional: Negative for fever, chills, and night sweats.  Skin: negative.  Eyes: negative.  Ears/Nose/Throat: negative.  Respiratory: No shortness of breath, dyspnea on exertion, cough, or hemoptysis.  Cardiovascular: negative.  Gastrointestinal: negative.  Genitourinary: negative.  Musculoskeletal:  "negative.  Neurologic: negative.  Psychiatric: negative.  Hematologic/Lymphatic/Immunologic: negative.  Endocrine: negative.    All other ROS negative unless mentioned in interval history.    Past medical, social, surgical, and family histories reviewed.    Allergies:  Allergies as of 07/11/2017 - Oskar as Reviewed 07/11/2017   Allergen Reaction Noted     No known drug allergies  07/05/2002       Current Medications:  Current Outpatient Prescriptions   Medication Sig Dispense Refill     simvastatin (ZOCOR) 20 MG tablet TAKE ONE TABLET BY MOUTH AT BEDTIME 30 tablet 0     oxybutynin (DITROPAN) 5 MG tablet TAKE ONE-HALF TABLET BY MOUTH TWICE A DAY 90 tablet 0     ciprofloxacin (CIPRO) 500 MG tablet Take 1 tablet (500 mg) by mouth 2 times daily 14 tablet 0     FLUoxetine (PROZAC) 20 MG capsule TAKE THREE CAPSULES BY MOUTH EVERY DAY TITRATE AS DIRECTED 270 capsule 0     hydrochlorothiazide (HYDRODIURIL) 25 MG tablet Take 1 tablet (25 mg) by mouth daily 90 tablet 1     nitrofurantoin, macrocrystal-monohydrate, (MACROBID) 100 MG capsule Take 1 capsule (100 mg) by mouth 2 times daily 14 capsule 0     venlafaxine (EFFEXOR XR) 37.5 MG 24 hr capsule Take 1 capsule (37.5 mg) by mouth daily 90 capsule 1     LORazepam (ATIVAN) 0.5 MG tablet Take 1 tablet (0.5 mg) by mouth every 4 hours as needed (Anxiety, Nausea/Vomiting or Sleep) 15 tablet 0     FLUoxetine (PROZAC) 20 MG capsule TAKE THREE CAPSULES BY MOUTH EVERY DAY TITRATE AS DIRECTED 270 capsule 0     acetaminophen (TYLENOL EX ST ARTHRITIS PAIN) 500 MG tablet Take 500-1,000 mg by mouth every 6 hours as needed for mild pain       omeprazole (PRILOSEC) 20 MG capsule Take by mouth daily          Physical Exam:  /73 (BP Location: Right arm, Patient Position: Chair, Cuff Size: Adult Regular)  Pulse 94  Temp 97.3  F (36.3  C) (Temporal)  Resp 16  Ht 1.676 m (5' 6\")  Wt 64.6 kg (142 lb 8 oz)  SpO2 94%  BMI 23 kg/m2  Wt Readings from Last 12 Encounters:   07/11/17 64.6 kg " "(142 lb 8 oz)   06/20/17 64.4 kg (142 lb)   05/30/17 64.5 kg (142 lb 1.6 oz)   05/09/17 63.5 kg (140 lb)   05/09/17 63.5 kg (140 lb)   04/19/17 64.4 kg (142 lb)   04/18/17 64.1 kg (141 lb 4.8 oz)   04/18/17 64.1 kg (141 lb 4.8 oz)   03/29/17 65 kg (143 lb 4.8 oz)   03/28/17 64.3 kg (141 lb 11.2 oz)   03/21/17 64.2 kg (141 lb 9.6 oz)   03/07/17 63.8 kg (140 lb 9.6 oz)     ECOG performance status: 0  GENERAL APPEARANCE: Healthy, alert and in no acute distress.  HEENT: Sclerae anicteric. PERRLA. Oropharynx without ulcers, lesions, or thrush.  NECK: Supple. No asymmetry or masses.  LYMPHATICS: No palpable cervical, supraclavicular, axillary, or inguinal lymphadenopathy.  RESP: Lungs clear to auscultation bilaterally without rales, rhonchi or wheezes.  BREAST: Scars of bilateral mastectomies  \"CARDIOVASCULAR: Regular rate and rhythm. Normal S1, S2; no S3 or S4. No murmur, gallop, or rub.  ABDOMEN: Soft, nontender. Bowel sounds normal. No palpable organomegaly or masses.  MUSCULOSKELETAL: Extremities without gross deformities noted. No edema of bilateral lower extremities.  SKIN: No suspicious lesions or rashes.  NEURO: Alert and oriented x 3. Cranial nerves II-XII grossly intact.  PSYCHIATRIC: Mentation and affect appear normal.    Laboratory/Imaging Studies:  No visits with results within 2 Week(s) from this visit.  Latest known visit with results is:    Orders Only on 06/21/2017   Component Date Value Ref Range Status     Color Urine 06/21/2017 Yellow   Final     Appearance Urine 06/21/2017 Clear   Final     Glucose Urine 06/21/2017 Negative  NEG mg/dL Final     Bilirubin Urine 06/21/2017 Negative  NEG Final     Ketones Urine 06/21/2017 Negative  NEG mg/dL Final     Specific Gravity Urine 06/21/2017 1.015  1.003 - 1.035 Final     Blood Urine 06/21/2017 Negative  NEG Final     pH Urine 06/21/2017 6.5  5.0 - 7.0 pH Final     Protein Albumin Urine 06/21/2017 Trace* NEG mg/dL Final     Urobilinogen Urine 06/21/2017 1.0  " 0.2 - 1.0 EU/dL Final     Nitrite Urine 06/21/2017 Positive* NEG Final     Leukocyte Esterase Urine 06/21/2017 Negative  NEG Final     Source 06/21/2017 Midstream Urine   Final     Specimen Description 06/21/2017 Midstream Urine   Final     Culture Micro 06/21/2017 <10,000 colonies/mL Mixed gram positive cher   Final     Micro Report Status 06/21/2017 FINAL 06/23/2017   Final     WBC Urine 06/21/2017 2-5* 0 - 2 /HPF Final     RBC Urine 06/21/2017 O - 2  0 - 2 /HPF Final     Hyaline Casts 06/21/2017 O - 2  0 - 2 /LPF Final     Bacteria Urine 06/21/2017 Few* NEG /HPF Final     Mucous Urine 06/21/2017 Present* NEG /LPF Final        No results found for this or any previous visit (from the past 744 hour(s)).    Assessment and plan:  (C50.412) Malignant neoplasm of upper-outer quadrant of left female breast, unspecified estrogen receptor status (H)  (primary encounter diagnosis)  Patient continued to do well and tolerating treatment without significant side effects. She will continue on Herceptin according to the treatment plan. I will see the patient again in 6 weeks or sooner if there is development so concerns. We will arrange for a MUGA scan prior to next visit.    (I10) Essential hypertension  Patient currently on hydrochlorothiazide 25 mg orally daily.  (E78.5) Hyperlipidemia LDL goal <130  Patient currently on Zocor 20 mg orally daily.    (F43.21) Adjustment disorder with depressed mood  Patient currently on Prozac 20 mg and Effexor 37.5 mg daily.    (Z51.11) Encounter for antineoplastic chemotherapy  Plan: NM MUGA rest test    The patient is ready to learn, no apparent learning barriers were identified.  Diagnosis and treatment plans were explained to the patient. The patient expressed understanding of the content. The patient asked appropriate questions. The patient questions were answered to her satisfaction.    Chart documentation with Dragon Voice recognition Software. Although reviewed after completion,  some words and grammatical errors may remain.

## 2017-07-11 NOTE — PATIENT INSTRUCTIONS
Pt to return on 8/1 for Herceptin. Copies of medication list and upcoming appointments given prior to discharge.

## 2017-07-11 NOTE — PATIENT INSTRUCTIONS
Please follow up with Dr. Ferguson in 6 weeks.  You will need labs and a MUGA scan prior to your appointment.    PORT Lab appointment date/time: 8/16/2017 @ 12:30am                                       TO BE DRAWN IN INFUSION!    Scan appointment date/time: 8/16/2017 @ 1:00pm                          Follow up appointment date/time: 8/22/2017 @ 11:30am    Infusion appointment date/time: 8/1/2017 @ 1:00pm      If you need to reschedule or have scheduling questions please call scheduling at [phone].    Please contact Radiology Scheduling at [phone] for any Radiology rescheduling questions or concerns.    Keep in mind to have any labs or scans that are needed done prior to your Oncology visit.      If you have any questions or concerns please feel free to call.    Britta Mims, [facility]  Oncology Clinic  [facility]  [phone]

## 2017-07-11 NOTE — NURSING NOTE
DISCHARGE PLAN:  Next appointments: See patient instruction section  Departure Mode: Ambulatory  Accompanied by: sister  0 minutes for nursing discharge (face to face time)   Britta Mims MA    Writing nurse did not see patient after Medical Oncology appointment to address questions/concerns/coordinate care; she was sent to infusion.  Patient to follow up in 6 weeks with MUGA and labs a week prior. Herceptin scheduled per treatment plan for 8/1/2017. Appointments scheduled. AVS given in infusion. Questions and concerns addressed to patient's satisfaction. Contact information provided and patient is encouraged to call with any that arise in the interim of care.  See patient instructions and Oncologist's Progress note for further details.    Lashell Mims CMA  [facility]  [facility]  [phone]  7/11/2017, 12:31 PM

## 2017-07-11 NOTE — ASSESSMENT & PLAN NOTE
Michaela Dorman  initially felt a mass in her left axilla. She then underwent mammogram which showed dense breasts with no suspicious lesions in 4/2016. She was then seen by surgery and underwent excisional LN biopsy that showed metastatic high-grade poorly differentiated carcinoma with a tumor size of 2.5 cm. Immunohistochemistry was done for ER/CT receptors that came back both negative. HER-2/boubacar was amplified by FISH. She then underwent MRI of the breast on 9/26/2016 that showed suspicious abnormality with multicentric left sided breast cancer that involved the left lateral breast form the nipple to the chest wall. A PET scan was obtained on 10/5/2016 that showed a hypermetabolic opacity in the left axilla wihtout other pathological activity. It was then recommended that she undergo neoadjuvant chemotherapy with Cytoxan and Doxorubicin that will be followed by Taxol, Herceptin. Patient initiated treatment on 10/11/2016.

## 2017-07-11 NOTE — NURSING NOTE
"Oncology Rooming Note    July 11, 2017 11:44 AM   Michaela Dorman is a 66 year old female who presents for:    Chief Complaint   Patient presents with     Oncology Clinic Visit     6 week follow up for Malignant neoplasm of upper-outer quadrant of left female breast     Chemotherapy     C8D1 today      Initial Vitals: /73 (BP Location: Right arm, Patient Position: Chair, Cuff Size: Adult Regular)  Pulse 94  Temp 97.3  F (36.3  C) (Temporal)  Resp 16  Ht 1.676 m (5' 6\")  Wt 64.6 kg (142 lb 8 oz)  SpO2 94%  BMI 23 kg/m2 Estimated body mass index is 23 kg/(m^2) as calculated from the following:    Height as of this encounter: 1.676 m (5' 6\").    Weight as of this encounter: 64.6 kg (142 lb 8 oz). Body surface area is 1.73 meters squared.  No Pain (0) Comment: Data Unavailable   No LMP recorded. Patient is postmenopausal.  Allergies reviewed: Yes  Medications reviewed: Yes    Medications: Medication refills not needed today.  Pharmacy name entered into EPIC:    [facility] [address] [place] [address] - [address]  [facility] [place], [place] - [address]  [facility] [place], [place] - [address] [facility]     Clinical concerns: None Dr. Ferguson was notified.      Britta Mims MA              "

## 2017-07-12 ENCOUNTER — MYC MEDICAL ADVICE (OUTPATIENT)
Dept: DERMATOLOGY | Facility: CLINIC | Age: 67
End: 2017-07-12

## 2017-07-14 ENCOUNTER — MYC MEDICAL ADVICE (OUTPATIENT)
Dept: FAMILY MEDICINE | Facility: OTHER | Age: 67
End: 2017-07-14

## 2017-07-14 DIAGNOSIS — M70.60 TROCHANTERIC BURSITIS, UNSPECIFIED LATERALITY: ICD-10-CM

## 2017-07-14 DIAGNOSIS — Z96.611 S/P REVERSE TOTAL SHOULDER ARTHROPLASTY, RIGHT: Primary | ICD-10-CM

## 2017-07-14 DIAGNOSIS — G89.18 POST-OP PAIN: ICD-10-CM

## 2017-07-14 DIAGNOSIS — Z90.13 S/P BILATERAL MASTECTOMY: ICD-10-CM

## 2017-07-17 RX ORDER — OXYCODONE AND ACETAMINOPHEN 5; 325 MG/1; MG/1
1 TABLET ORAL EVERY 4 HOURS PRN
Qty: 20 TABLET | Refills: 0 | Status: SHIPPED | OUTPATIENT
Start: 2017-07-17 | End: 2017-09-25

## 2017-07-18 ENCOUNTER — TELEPHONE (OUTPATIENT)
Dept: DERMATOLOGY | Facility: CLINIC | Age: 67
End: 2017-07-18

## 2017-07-18 NOTE — TELEPHONE ENCOUNTER
Called patient to reschedule her an appointment.  Left message to call [phone].  11:45 am held for patient tomorrow with Dr. Borja.    Carina Mcarthur, NATHANIEL

## 2017-07-19 NOTE — TELEPHONE ENCOUNTER
Patient returned clinics call and was scheduled with Dr. Borja on August 2, 2017 for rebiopsy with Dr. Borja. Patient had no questions or concerns at this time.     Hilda Eli LPN

## 2017-07-26 ENCOUNTER — HOSPITAL ENCOUNTER (OUTPATIENT)
Dept: PHYSICAL THERAPY | Facility: CLINIC | Age: 67
Setting detail: THERAPIES SERIES
End: 2017-07-26
Attending: PHYSICIAN ASSISTANT
Payer: MEDICARE

## 2017-07-26 PROCEDURE — G8982 BODY POS GOAL STATUS: HCPCS | Mod: GP,CI | Performed by: PHYSICAL THERAPIST

## 2017-07-26 PROCEDURE — G8978 MOBILITY CURRENT STATUS: HCPCS | Mod: GP,CJ | Performed by: PHYSICAL THERAPIST

## 2017-07-26 PROCEDURE — 97140 MANUAL THERAPY 1/> REGIONS: CPT | Mod: GP | Performed by: PHYSICAL THERAPIST

## 2017-07-26 PROCEDURE — 40000718 ZZHC STATISTIC PT DEPARTMENT ORTHO VISIT: Performed by: PHYSICAL THERAPIST

## 2017-07-26 PROCEDURE — G8979 MOBILITY GOAL STATUS: HCPCS | Mod: GP,CI | Performed by: PHYSICAL THERAPIST

## 2017-07-26 PROCEDURE — G8981 BODY POS CURRENT STATUS: HCPCS | Mod: GP,CJ | Performed by: PHYSICAL THERAPIST

## 2017-07-26 NOTE — PROGRESS NOTES
[facility]      OUTPATIENT PHYSICAL THERAPY  PLAN OF TREATMENT FOR OUTPATIENT REHABILITATION    Patient's Last Name, First Name, M.I.                YOB: 1950  DandyMichaela PRESCOTT                        Provider's Name  [facility] Medical Record No.  [id number]                               Onset Date: 4/14/2017   Start of Care Date:  6/2/2017    Type:     _X_PT   ___OT   ___SLP Medical Diagnosis: B hip pain, LBP, L ankle pain                       PT Diagnosis: Sacral dysfunction, reduced ROM, reduced neural mobility, joint play reduced    _________________________________________________________________________________  Plan of Treatment:  MT, TE, Taping, TA, modalities as needed    Michaela Dorman  1950    Session Number: 5/10 since start of care.  Frequency/Duration  1 times per week for 8 weeks weeks for a total of 8 visits.        Physician Signature:    Date:    X_______________________________________________________    Physician Name: Phani Jason PA-C   I certify the need for these services furnished under this plan of treatment and while under my care. Physician co-signature of this document indicates review and certification of the therapy plan.  This signature may be written on paper, or electronically signed within EPIC.        Goals:  Goal Identifier 1   Goal Description Patient will improve activity tolerance via LEFS improvements from 34/80 to 55/80 to return to recreational workout routines.    Target Date 07/28/17   Date Met      Progress: Better, didn't do the LEFS today but will repeat in future visit     Goal Identifier 2   Goal Description Patient will note ability to sit for 2 hours without pain to better tolerance recreational and home based activities in 6 weeks   Target Date 07/14/17   Date Met  07/26/17   Progress:     Goal Identifier 3   Goal  Description Patient will report no increased back pain during light exercise routine in 8 weeks   Target Date 9/22/2017    Date Met      Progress:Was doing well, goal extended     Progress Toward Goals:   Progress this reporting period: Good progress.       Reasons for Continuing Treatment:   Patient has attended 5 visits from 6/2 and was seen last on 6/27 until today. Several weeks of no symptoms but increased back pain recently. Was managing with self stretches and HEP and appropriate to revisit a few more MT sessions and gain control again   Recertification Period  7/26/2017 - 9/22/2017  Radha Preston, PT          I CERTIFY THE NEED FOR THESE SERVICES FURNISHED UNDER        THIS PLAN OF TREATMENT AND WHILE UNDER MY CARE     (Physician co-signature of this document indicates review and certification of the therapy plan).                  Referring Provider: Phani Jason PA-C

## 2017-08-01 ENCOUNTER — INFUSION THERAPY VISIT (OUTPATIENT)
Dept: INFUSION THERAPY | Facility: CLINIC | Age: 67
End: 2017-08-01
Attending: INTERNAL MEDICINE
Payer: MEDICARE

## 2017-08-01 VITALS
SYSTOLIC BLOOD PRESSURE: 148 MMHG | TEMPERATURE: 98 F | RESPIRATION RATE: 16 BRPM | DIASTOLIC BLOOD PRESSURE: 76 MMHG | HEART RATE: 76 BPM

## 2017-08-01 DIAGNOSIS — C50.412 MALIGNANT NEOPLASM OF UPPER-OUTER QUADRANT OF LEFT FEMALE BREAST, UNSPECIFIED ESTROGEN RECEPTOR STATUS (H): ICD-10-CM

## 2017-08-01 DIAGNOSIS — C50.412 MALIGNANT NEOPLASM OF UPPER-OUTER QUADRANT OF LEFT FEMALE BREAST (H): ICD-10-CM

## 2017-08-01 DIAGNOSIS — Z51.11 ENCOUNTER FOR ANTINEOPLASTIC CHEMOTHERAPY: Primary | ICD-10-CM

## 2017-08-01 PROCEDURE — 96413 CHEMO IV INFUSION 1 HR: CPT

## 2017-08-01 PROCEDURE — 25000128 H RX IP 250 OP 636: Performed by: INTERNAL MEDICINE

## 2017-08-01 RX ORDER — DIPHENHYDRAMINE HYDROCHLORIDE 50 MG/ML
50 INJECTION INTRAMUSCULAR; INTRAVENOUS
Status: CANCELLED
Start: 2017-08-01

## 2017-08-01 RX ORDER — SODIUM CHLORIDE 9 MG/ML
1000 INJECTION, SOLUTION INTRAVENOUS CONTINUOUS PRN
Status: CANCELLED
Start: 2017-08-01

## 2017-08-01 RX ORDER — LORAZEPAM 0.5 MG/1
.5-1 TABLET ORAL EVERY 6 HOURS PRN
Status: CANCELLED
Start: 2017-08-01

## 2017-08-01 RX ORDER — LORAZEPAM 2 MG/ML
.5-1 INJECTION INTRAMUSCULAR EVERY 6 HOURS PRN
Status: CANCELLED | OUTPATIENT
Start: 2017-08-01

## 2017-08-01 RX ORDER — PROCHLORPERAZINE MALEATE 5 MG
10 TABLET ORAL EVERY 6 HOURS PRN
Status: CANCELLED
Start: 2017-08-01

## 2017-08-01 RX ORDER — HEPARIN SODIUM (PORCINE) LOCK FLUSH IV SOLN 100 UNIT/ML 100 UNIT/ML
500 SOLUTION INTRAVENOUS EVERY 8 HOURS
Status: DISCONTINUED | OUTPATIENT
Start: 2017-08-01 | End: 2017-08-01 | Stop reason: HOSPADM

## 2017-08-01 RX ORDER — ACETAMINOPHEN 325 MG/1
650 TABLET ORAL
Status: CANCELLED
Start: 2017-08-01

## 2017-08-01 RX ORDER — ALBUTEROL SULFATE 90 UG/1
1-2 AEROSOL, METERED RESPIRATORY (INHALATION)
Status: CANCELLED
Start: 2017-08-01

## 2017-08-01 RX ORDER — HEPARIN SODIUM (PORCINE) LOCK FLUSH IV SOLN 100 UNIT/ML 100 UNIT/ML
500 SOLUTION INTRAVENOUS EVERY 8 HOURS
Status: CANCELLED
Start: 2017-08-01

## 2017-08-01 RX ORDER — ALBUTEROL SULFATE 0.83 MG/ML
2.5 SOLUTION RESPIRATORY (INHALATION)
Status: CANCELLED | OUTPATIENT
Start: 2017-08-01

## 2017-08-01 RX ORDER — MEPERIDINE HYDROCHLORIDE 25 MG/ML
25 INJECTION INTRAMUSCULAR; INTRAVENOUS; SUBCUTANEOUS EVERY 30 MIN PRN
Status: CANCELLED | OUTPATIENT
Start: 2017-08-01

## 2017-08-01 RX ORDER — METHYLPREDNISOLONE SODIUM SUCCINATE 125 MG/2ML
125 INJECTION, POWDER, LYOPHILIZED, FOR SOLUTION INTRAMUSCULAR; INTRAVENOUS
Status: CANCELLED
Start: 2017-08-01

## 2017-08-01 RX ADMIN — SODIUM CHLORIDE 250 ML: 9 INJECTION, SOLUTION INTRAVENOUS at 13:25

## 2017-08-01 RX ADMIN — SODIUM CHLORIDE, PRESERVATIVE FREE 500 UNITS: 5 INJECTION INTRAVENOUS at 14:10

## 2017-08-01 RX ADMIN — TRASTUZUMAB 382 MG: 150 INJECTION, POWDER, LYOPHILIZED, FOR SOLUTION INTRAVENOUS at 13:27

## 2017-08-01 NOTE — MR AVS SNAPSHOT
After Visit Summary   8/1/2017    Michaela Dorman    MRN: [id number]           Patient Information     Date Of Birth          1950        Visit Information        Provider Department      8/1/2017 1:00 PM NL INFUSION CHAIR 4 [facility]        Today's Diagnoses     Encounter for antineoplastic chemotherapy    -  1    Malignant neoplasm of upper-outer quadrant of left female breast (H)        Malignant neoplasm of upper-outer quadrant of left female breast, unspecified estrogen receptor status (H)          Care Instructions    Pt to return on 8/22/17 for Herceptin. Copies of medication list and upcoming appointments given prior to discharge.           Follow-ups after your visit        Your next 10 appointments already scheduled     Aug 02, 2017 11:45 AM CDT   [facility]ien [facility]    [address] [place] [address]   [phone]            Aug 10, 2017 11:15 AM CDT   Ortho Treatment with Radha Preston PT   [facility] ([facility])    [address] [place] [address] [place] [address]   [phone]            Aug 16, 2017 12:30 PM CDT   LAB with Phlab   [facility] ([facility])    [address] [place] [address]   [phone]            Aug 16, 2017 12:30 PM CDT   Level 1 with NL INFUSION CHAIR 2   [facility] ([facility])    [address] [place] [address] [place] [address]   [phone]            Aug 16, 2017  1:00 PM CDT   NM HEART MUGA REST with PHNM1   [facility] Nuclear Medicine ([facility])    [address] [place] [address]   [phone]           Please bring a list of your medicines to the exam. (Include vitamins, minerals and over-the-counter drugs.) You should wear comfortable clothes. Leave your valuables at home.  Please bring related prior results and films.  Tell your doctor:   If you are breastfeeding or may be pregnant.   If you have had a barium test within the past few days. Barium may change the results of certain exams.   If you think you may need sedation (medicine to help you relax).  You may eat and drink as normal.  Please call your Imaging Department at your exam site with any questions.            Aug 17, 2017  1:45 PM CDT   Ortho Treatment with Radha Preston PT   [facility] ([facility])    [address] [place] [address]   [phone]            Aug 22, 2017 11:30 AM CDT   Return Visit with Syeda Ferguson MD   [facility]    [address] [place] [address]   [phone]            Aug 22, 2017 12:00 PM CDT   Level 2 with NL INFUSION CHAIR 5   [facility] ([facility])    [address] [place] [address] [address] [place] [address]   [phone]            Aug 24, 2017 10:45 AM CDT   Ortho Treatment with Radha Preston PT   [facility] ([facility])    [address] [place] [address]   [phone]            Sep 12, 2017  1:00 PM CDT   Level 2 with NL INFUSION CHAIR 3   [facility] ([facility])    [address] [place] [address] [address] [place] [address]   [phone]              Who to contact     If you have questions or need follow up information about today's clinic visit or your schedule please contact [facility] directly at [phone].  Normal or non-critical lab and imaging results will be communicated to you by MyChart, letter or phone within 4 business days after the clinic has received the results. If you do not hear from us within 7 days, please contact the clinic through MyChart or phone. If you have a critical or abnormal lab result, we will notify you by phone as  soon as possible.  Submit refill requests through [email or web address] or call your pharmacy and they will forward the refill request to us. Please allow 3 business days for your refill to be completed.          Additional Information About Your Visit        [employer] Information     [email or web address] gives you secure access to your electronic health record. If you see a primary care provider, you can also send messages to your care team and make appointments. If you have questions, please call your primary care clinic.  If you do not have a primary care provider, please call [phone] and they will assist you.        Care EveryWhere ID     This is your Care EveryWhere ID. This could be used by other organizations to access your [facility] medical records  [id number]        Your Vitals Were     Pulse Temperature Respirations             76 98  F (36.7  C) (Oral) 16          Blood Pressure from Last 3 Encounters:   08/01/17 148/76   07/11/17 130/74   07/11/17 126/73    Weight from Last 3 Encounters:   07/11/17 64.6 kg (142 lb 8 oz)   06/20/17 64.4 kg (142 lb)   05/30/17 64.5 kg (142 lb 1.6 oz)              Today, you had the following     No orders found for display       Primary Care Provider Office Phone # Fax #    Phani Tapia PA-C [phone]       [facility] [address] [place] [address]        Equal Access to Services     SHIVANI ARTIS : Hadii aad ku hadasho Soomaali, waaxda luqadaha, qaybta kaalmada adeegyada, wale pena. So [facility] [phone].    ATENCIÓN: Si habla español, tiene a ordoñez disposición servicios gratuitos de asistencia lingüística. Llhosea al [phone].    We comply with applicable federal civil rights laws and [place] laws. We do not discriminate on the basis of race, color, national origin, age, disability sex, sexual orientation or gender identity.            Thank you!     Thank you for choosing [facility]  for your care. Our  goal is always to provide you with excellent care. Hearing back from our patients is one way we can continue to improve our services. Please take a few minutes to complete the written survey that you may receive in the mail after your visit with us. Thank you!             Your Updated Medication List - Protect others around you: Learn how to safely use, store and throw away your medicines at www.disposemymeds.org.          This list is accurate as of: 8/1/17  4:15 PM.  Always use your most recent med list.                   Brand Name Dispense Instructions for use Diagnosis    * FLUoxetine 20 MG capsule    PROzac    270 capsule    TAKE THREE CAPSULES BY MOUTH EVERY DAY TITRATE AS DIRECTED    Adjustment disorder with depressed mood       * FLUoxetine 20 MG capsule    PROzac    270 capsule    TAKE THREE CAPSULES BY MOUTH EVERY DAY TITRATE AS DIRECTED    Adjustment disorder with depressed mood       hydrochlorothiazide 25 MG tablet    HYDRODIURIL    90 tablet    Take 1 tablet (25 mg) by mouth daily    Essential hypertension       LORazepam 0.5 MG tablet    ATIVAN    15 tablet    Take 1 tablet (0.5 mg) by mouth every 4 hours as needed (Anxiety, Nausea/Vomiting or Sleep)    Malignant neoplasm of upper-outer quadrant of left female breast (H), Encounter for antineoplastic chemotherapy       nitroFURantoin (macrocrystal-monohydrate) 100 MG capsule    MACROBID    14 capsule    Take 1 capsule (100 mg) by mouth 2 times daily    Acute cystitis without hematuria       oxybutynin 5 MG tablet    DITROPAN    90 tablet    TAKE ONE-HALF TABLET BY MOUTH TWICE A DAY    Dysuria       oxyCODONE-acetaminophen 5-325 MG per tablet    PERCOCET    20 tablet    Take 1 tablet by mouth every 4 hours as needed for pain    Post-op pain, S/p reverse total shoulder arthroplasty, right, S/P bilateral mastectomy, Trochanteric bursitis, unspecified laterality       priLOSEC 20 MG CR capsule   Generic drug:  omeprazole      Take by mouth daily         simvastatin 20 MG tablet    ZOCOR    30 tablet    TAKE ONE TABLET BY MOUTH AT BEDTIME    Hyperlipidemia with target LDL less than 130       TYLENOL EX ST ARTHRITIS PAIN 500 MG tablet   Generic drug:  acetaminophen      Take 500-1,000 mg by mouth every 6 hours as needed for mild pain    Dermatitis due to sun       venlafaxine 37.5 MG 24 hr capsule    EFFEXOR XR    90 capsule    Take 1 capsule (37.5 mg) by mouth daily    Adjustment disorder with depressed mood       * Notice:  This list has 2 medication(s) that are the same as other medications prescribed for you. Read the directions carefully, and ask your doctor or other care provider to review them with you.

## 2017-08-01 NOTE — PATIENT INSTRUCTIONS
Pt to return on 8/22/17 for Herceptin. Copies of medication list and upcoming appointments given prior to discharge.

## 2017-08-02 ENCOUNTER — OFFICE VISIT (OUTPATIENT)
Dept: DERMATOLOGY | Facility: CLINIC | Age: 67
End: 2017-08-02
Payer: COMMERCIAL

## 2017-08-02 DIAGNOSIS — L57.0 AK (ACTINIC KERATOSIS): ICD-10-CM

## 2017-08-02 DIAGNOSIS — D48.5 NEOPLASM OF UNCERTAIN BEHAVIOR OF SKIN OF CHEST: Primary | ICD-10-CM

## 2017-08-02 PROCEDURE — 88305 TISSUE EXAM BY PATHOLOGIST: CPT | Performed by: DERMATOLOGY

## 2017-08-02 PROCEDURE — 11300 SHAVE SKIN LESION 0.5 CM/<: CPT | Mod: 59 | Performed by: DERMATOLOGY

## 2017-08-02 PROCEDURE — 17000 DESTRUCT PREMALG LESION: CPT | Performed by: DERMATOLOGY

## 2017-08-02 RX ORDER — LIDOCAINE HYDROCHLORIDE AND EPINEPHRINE 10; 10 MG/ML; UG/ML
3 INJECTION, SOLUTION INFILTRATION; PERINEURAL ONCE
Qty: 0.5 ML | Refills: 0 | OUTPATIENT
Start: 2017-08-02 | End: 2017-08-02

## 2017-08-02 NOTE — PROGRESS NOTES
Subjective: Pt with hx of NMSC presents for a re-biopsy of a NUB of the right upper chest after it was lost by lab. There is also a lesion of concern on her left cheek she would like examined. Pt is unable to recall if it was previously treated with cryotherapy. It is scaly but no pain.    ROS:  -Skin: As above in HPI. No additional skin concerns.   -Const: No unintended weight changes, night sweats, fevers, or chills.    Objective:   -Gen: This is a well developed, well nourish female in no acute distress, in a pleasant mood.  -SKIN: Focused examination of the chest and face was performed.  -5 mm scaly papule at site of prior biopsy. Verified with photo.   -there is an AK on the left central face, distal to the previous NMSC scar by at least 5 mm      A/P:  1. AK, left central face  Cryotherapy procedure note: After verbal consent and discussion of risks and benefits including but no limited to dyspigmentation/scar, blister, and pain, 1 was treated with 1-2mm freeze border for 2 cycles with liquid nitrogen. Post cryotherapy instructions were provided.     2. Neoplasm of uncertain behavior, right upper chest: DDx: pigmented BCC versus other  Shave removal:  After discussion of benefits and risks including but not limited to bleeding/bruising, pain/swelling, infection, scar, incomplete removal, nerve damage/numbness, recurrence, and non-diagnostic biopsy, written consent, verbal consent and photographs were obtained. Time-out was performed. The area was cleaned with isopropyl alcohol. 0.5 mL of 1% lidocaine with epinephrine was injected to obtain adequate anesthesia of the lesion on the right upper chest. A shave removal was performed. Hemostasis was achieved with aluminium chloride. Vaseline and a sterile dressing were applied. The patient tolerated the procedure and no complications were noted. The patient was provided with verbal and written post care instructions.     Follow-up 6 months for skin check, pending  biopsy results.    Staff Involved: Scribe/Staff    Scribe Disclosure:   I, Alex Choi, am serving as a scribe to document services personally performed by Dr. Damien Borja, based on data collection and the provider's statements to me.    Provider Disclosure:   I have reviewed the documentation recorded by the scribe and have edited it as needed. I have personally performed the services documented here and the documentation accurately represents those services and the decisions made by me.     Damien Borja MD, MS    Department of Dermatology  [facility]: Phone: [phone], Fax:[phone]  [facility]: Phone: [phone], Fax: [phone]

## 2017-08-02 NOTE — NURSING NOTE
Dermatology Rooming Note    Michaela Dorman's goals for this visit include:   Chief Complaint   Patient presents with     Derm Problem     biopsy spot on RT chest, also scheck spot on LT side of facew       Is a scribe okay for this visit:YES    Are records needed for this visit(If yes, obtain release of information): Not applicable     Vitals: There were no vitals taken for this visit.    Referring Provider:  No referring provider defined for this encounter.

## 2017-08-02 NOTE — MR AVS SNAPSHOT
After Visit Summary   8/2/2017    Michaela Dorman    MRN: [id number]           Patient Information     Date Of Birth          1950        Visit Information        Provider Department      8/2/2017 11:45 AM Damien Borja MD [facility]        Today's Diagnoses     Neoplasm of uncertain behavior of skin of chest    -  1    AK (actinic keratosis)          Care Instructions    Wound Care After a Biopsy    What is a skin biopsy?  A skin biopsy allows the doctor to examine a very small piece of tissue under the microscope to determine the diagnosis and the best treatment for the skin condition. A local anesthetic (numbing medicine)  is injected with a very small needle into the skin area to be tested. A small piece of skin is taken from the area. Sometimes a suture (stitch) is used.     What are the risks of a skin biopsy?  I will experience scar, bleeding, swelling, pain, crusting and redness. I may experience incomplete removal or recurrence. Risks of this procedure are excessive bleeding, bruising, infection, nerve damage, numbness, thick (hypertrophic or keloidal) scar and non-diagnostic biopsy.    How should I care for my wound for the first 24 hours?    Keep the wound dry and covered for 24 hours    If it bleeds, hold direct pressure on the area for 15 minutes. If bleeding does not stop then go to the emergency room    Avoid strenuous exercise the first 1-2 days or as your doctor instructs you    How should I care for the wound after 24 hours?    After 24 hours, remove the bandage    You may bathe or shower as normal    If you had a scalp biopsy, you can shampoo as usual and can use shower water to clean the biopsy site daily    Clean the wound twice a day with gentle soap and water    Do not scrub, be gentle    Apply white petroleum/Vaseline after cleaning the wound with a cotton swab or a clean finger, and keep the site covered with a Bandaid /bandage. Bandages are not  necessary with a scalp biopsy    If you are unable to cover the site with a Bandaid /bandage, re-apply ointment 2-3 times a day to keep the site moist. Moisture will help with healing    Avoid strenuous activity for first 1-2 days    Avoid lakes, rivers, pools, and oceans until the stitches are removed or the site is healed    How do I clean my wound?    Wash hands thoroughly with soap or use hand  before all wound care    Clean the wound with gentle soap and water    Apply white petroleum/Vaseline  to wound after it is clean    Replace the Bandaid /bandage to keep the wound covered for the first few days or as instructed by your doctor    If you had a scalp biopsy, warm shower water to the area on a daily basis should suffice    What should I use to clean my wound?     Cotton-tipped applicators (Qtips )    White petroleum jelly (Vaseline ). Use a clean new container and use Q-tips to apply.    Bandaids   as needed    Gentle soap     How should I care for my wound long term?    Do not get your wound dirty    Keep up with wound care for one week or until the area is healed.    A small scab will form and fall off by itself when the area is completely healed. The area will be red and will become pink in color as it heals. Sun protection is very important for how your scar will turn out. Sunscreen with an SPF 30 or greater is recommended once the area is healed.    You should have some soreness but it should be mild and slowly go away over several days. Talk to your doctor about using tylenol for pain,    When should I call my doctor?  If you have increased:     Pain or swelling    Pus or drainage (clear or slightly yellow drainage is ok)    Temperature over 100F    Spreading redness or warmth around wound    When will I hear about my results?  The biopsy results can take 2-3 weeks to come back. The clinic will call you with the results, send you a [employer] message, or have you schedule a follow-up clinic or  phone time to discuss the results. Contact our clinics if you do not hear from us in 3 weeks.     Who should I call with questions?    [facility]: [phone]     [facility]: [phone]    For urgent needs outside of business hours call the [facility] at [phone] and ask for the dermatology resident on call    _______________________________________________________________      Cryotherapy    What is it?    Use of a very cold liquid, such as liquid nitrogen, to freeze and destroy abnormal skin cells that need to be removed    What should I expect?    Tenderness and redness    A small blister that might grow and fill with dark purple blood. There may be crusting.    More than one treatment may be needed if the lesions do not go away.    How do I care for the treated area?    Gently wash the area with your hands when bathing.    Use a thin layer of Vaseline to help with healing. You may use a Band-Aid.     The area should heal within 7-10 days and may leave behind a pink or lighter color.     Do not use an antibiotic or Neosporin ointment.     You may take acetaminophen (Tylenol) for pain.     Call your Doctor if you have:    Severe pain    Signs of infection (warmth, redness, cloudy yellow drainage, and or a bad smell)    Questions or concerns    Who should I call with questions?       [facility]: [phone]       [facility]: [phone]       For urgent needs outside of business hours call the [facility] at [phone]        and ask for the dermatology resident on call            Follow-ups after your visit        Your next 10 appointments already scheduled     Aug 10, 2017 11:15 AM CDT   Ortho Treatment with Radha Preston PT   [facility] ([facility])    [address] [place] [address] [address] [place] [id number] [phone]            Aug  16, 2017 12:30 PM CDT   LAB with Phlab   [facility] ([facility])    [address]  [place] [address]   [phone]            Aug 16, 2017 12:30 PM CDT   Level 1 with NL INFUSION CHAIR 2   [facility] ([facility])    [address] [place] [address] [address] [place] [address]   [phone]            Aug 16, 2017  1:00 PM CDT   NM HEART MUGA REST with PHNM1   [facility] ([facility])    [address] [place] [address]   [phone]           Please bring a list of your medicines to the exam. (Include vitamins, minerals and over-the-counter drugs.) You should wear comfortable clothes. Leave your valuables at home. Please bring related prior results and films.  Tell your doctor:   If you are breastfeeding or may be pregnant.   If you have had a barium test within the past few days. Barium may change the results of certain exams.   If you think you may need sedation (medicine to help you relax).  You may eat and drink as normal.  Please call your Imaging Department at your exam site with any questions.            Aug 17, 2017  1:45 PM CDT   Ortho Treatment with Radha Preston, PT   [facility] ([facility])    [address] [place] [address] [address] [place] [address]   [phone]            Aug 22, 2017 11:30 AM CDT   Return Visit with Syeda Ferguson MD   [facility]    [address]  [place] [address]   [phone]            Aug 22, 2017 12:00 PM CDT   Level 2 with NL INFUSION CHAIR 5   [facility] ([facility])    [address] [place] [address] [address] [place] [address]   [phone]            Aug 24, 2017 10:45 AM CDT   Ortho Treatment with Radha Preston PT   [facility] ([facility])    [address] [place] [address] [address] [place] [address]   [phone]             Sep 12, 2017  1:00 PM CDT   Level 2 with NL INFUSION CHAIR 3   [facility] ([facility])    [address] [place] [id number] [phone]            Dec 19, 2017 12:30 PM CST   Return Visit with Loren Felipe MD   [facility])    [address] [place] [address] [phone]              Who to contact     If you have questions or need follow up information about today's clinic visit or your schedule please contact [facility] directly at [phone].  Normal or non-critical lab and imaging results will be communicated to you by "[employer]"hart, letter or phone within 4 business days after the clinic has received the results. If you do not hear from us within 7 days, please contact the clinic through "[employer]"hart or phone. If you have a critical or abnormal lab result, we will notify you by phone as soon as possible.  Submit refill requests through [employer] or call your pharmacy and they will forward the refill request to us. Please allow 3 business days for your refill to be completed.          Additional Information About Your Visit        "[employer] Information     [employer] gives you secure access to your electronic health record. If you see a primary care provider, you can also send messages to your care team and make appointments. If you have questions, please call your primary care clinic.  If you do not have a primary care provider, please call [phone] and they will assist you.      [employer] is an electronic gateway that provides easy, online access to your medical records. With [employer], you can request a clinic appointment, read your test results, renew a prescription or communicate with your care team.     To access your existing account, please contact your [facility] or call [phone] for assistance.        Care EveryWhere ID     This is your  Care EveryWhere ID. This could be used by other organizations to access your [facility] medical records  [id number]         Blood Pressure from Last 3 Encounters:   08/01/17 148/76   07/11/17 130/74   07/11/17 126/73    Weight from Last 3 Encounters:   07/11/17 64.6 kg (142 lb 8 oz)   06/20/17 64.4 kg (142 lb)   05/30/17 64.5 kg (142 lb 1.6 oz)              We Performed the Following     DESTRUCT PREMALIGNANT LESION, FIRST     SHAV SKIN LESION TRUNK/ARM/LEG <=0.5 CM     Surgical pathology exam          Today's Medication Changes          These changes are accurate as of: 8/2/17 11:58 AM.  If you have any questions, ask your nurse or doctor.               Start taking these medicines.        Dose/Directions    lidocaine 1% with EPINEPHrine 1:100,000 1 %-1:605716 injection   Used for:  Neoplasm of uncertain behavior of skin of chest        Dose:  3 mL   Inject 3 mLs into the skin once for 1 dose   Quantity:  0.5 mL   Refills:  0            Where to get your medicines      Some of these will need a paper prescription and others can be bought over the counter.  Ask your nurse if you have questions.     You don't need a prescription for these medications     lidocaine 1% with EPINEPHrine 1:100,000 1 %-1:790725 injection                Primary Care Provider Office Phone # Fax #    Phani Tapia PA-C [phone]       [facility] [address] [place] [address]        Equal Access to Services     SHIVANI ARTIS AH: Hadii santa le Sojose, waaxda lujamesadaha, qaybta kaalmada padmini, wale winter [facility] pena. So [facility] [phone].    ATENCIÓN: Si habla español, tiene a ordoñez disposición servicios gratuitos de asistencia lingüística. Llame al [phone].    We comply with applicable federal civil rights laws and [place] laws. We do not discriminate on the basis of race, color, national origin, age, disability sex, sexual orientation or gender identity.            Thank you!      Thank you for choosing [facility]  for your care. Our goal is always to provide you with excellent care. Hearing back from our patients is one way we can continue to improve our services. Please take a few minutes to complete the written survey that you may receive in the mail after your visit with us. Thank you!             Your Updated Medication List - Protect others around you: Learn how to safely use, store and throw away your medicines at www.disposemymeds.org.          This list is accurate as of: 8/2/17 11:58 AM.  Always use your most recent med list.                   Brand Name Dispense Instructions for use Diagnosis    * FLUoxetine 20 MG capsule    PROzac    270 capsule    TAKE THREE CAPSULES BY MOUTH EVERY DAY TITRATE AS DIRECTED    Adjustment disorder with depressed mood       * FLUoxetine 20 MG capsule    PROzac    270 capsule    TAKE THREE CAPSULES BY MOUTH EVERY DAY TITRATE AS DIRECTED    Adjustment disorder with depressed mood       hydrochlorothiazide 25 MG tablet    HYDRODIURIL    90 tablet    Take 1 tablet (25 mg) by mouth daily    Essential hypertension       lidocaine 1% with EPINEPHrine 1:100,000 1 %-1:539001 injection     0.5 mL    Inject 3 mLs into the skin once for 1 dose    Neoplasm of uncertain behavior of skin of chest       LORazepam 0.5 MG tablet    ATIVAN    15 tablet    Take 1 tablet (0.5 mg) by mouth every 4 hours as needed (Anxiety, Nausea/Vomiting or Sleep)    Malignant neoplasm of upper-outer quadrant of left female breast (H), Encounter for antineoplastic chemotherapy       oxybutynin 5 MG tablet    DITROPAN    90 tablet    TAKE ONE-HALF TABLET BY MOUTH TWICE A DAY    Dysuria       oxyCODONE-acetaminophen 5-325 MG per tablet    PERCOCET    20 tablet    Take 1 tablet by mouth every 4 hours as needed for pain    Post-op pain, S/p reverse total shoulder arthroplasty, right, S/P bilateral mastectomy, Trochanteric bursitis, unspecified laterality       priLOSEC 20  MG CR capsule   Generic drug:  omeprazole      Take by mouth daily        simvastatin 20 MG tablet    ZOCOR    30 tablet    TAKE ONE TABLET BY MOUTH AT BEDTIME    Hyperlipidemia with target LDL less than 130       TYLENOL EX ST ARTHRITIS PAIN 500 MG tablet   Generic drug:  acetaminophen      Take 500-1,000 mg by mouth every 6 hours as needed for mild pain    Dermatitis due to sun       venlafaxine 37.5 MG 24 hr capsule    EFFEXOR XR    90 capsule    Take 1 capsule (37.5 mg) by mouth daily    Adjustment disorder with depressed mood       * Notice:  This list has 2 medication(s) that are the same as other medications prescribed for you. Read the directions carefully, and ask your doctor or other care provider to review them with you.

## 2017-08-02 NOTE — PATIENT INSTRUCTIONS
Wound Care After a Biopsy    What is a skin biopsy?  A skin biopsy allows the doctor to examine a very small piece of tissue under the microscope to determine the diagnosis and the best treatment for the skin condition. A local anesthetic (numbing medicine)  is injected with a very small needle into the skin area to be tested. A small piece of skin is taken from the area. Sometimes a suture (stitch) is used.     What are the risks of a skin biopsy?  I will experience scar, bleeding, swelling, pain, crusting and redness. I may experience incomplete removal or recurrence. Risks of this procedure are excessive bleeding, bruising, infection, nerve damage, numbness, thick (hypertrophic or keloidal) scar and non-diagnostic biopsy.    How should I care for my wound for the first 24 hours?    Keep the wound dry and covered for 24 hours    If it bleeds, hold direct pressure on the area for 15 minutes. If bleeding does not stop then go to the emergency room    Avoid strenuous exercise the first 1-2 days or as your doctor instructs you    How should I care for the wound after 24 hours?    After 24 hours, remove the bandage    You may bathe or shower as normal    If you had a scalp biopsy, you can shampoo as usual and can use shower water to clean the biopsy site daily    Clean the wound twice a day with gentle soap and water    Do not scrub, be gentle    Apply white petroleum/Vaseline after cleaning the wound with a cotton swab or a clean finger, and keep the site covered with a Bandaid /bandage. Bandages are not necessary with a scalp biopsy    If you are unable to cover the site with a Bandaid /bandage, re-apply ointment 2-3 times a day to keep the site moist. Moisture will help with healing    Avoid strenuous activity for first 1-2 days    Avoid lakes, rivers, pools, and oceans until the stitches are removed or the site is healed    How do I clean my wound?    Wash hands thoroughly with soap or use hand  before all  wound care    Clean the wound with gentle soap and water    Apply white petroleum/Vaseline  to wound after it is clean    Replace the Bandaid /bandage to keep the wound covered for the first few days or as instructed by your doctor    If you had a scalp biopsy, warm shower water to the area on a daily basis should suffice    What should I use to clean my wound?     Cotton-tipped applicators (Qtips )    White petroleum jelly (Vaseline ). Use a clean new container and use Q-tips to apply.    Bandaids   as needed    Gentle soap     How should I care for my wound long term?    Do not get your wound dirty    Keep up with wound care for one week or until the area is healed.    A small scab will form and fall off by itself when the area is completely healed. The area will be red and will become pink in color as it heals. Sun protection is very important for how your scar will turn out. Sunscreen with an SPF 30 or greater is recommended once the area is healed.    You should have some soreness but it should be mild and slowly go away over several days. Talk to your doctor about using tylenol for pain,    When should I call my doctor?  If you have increased:     Pain or swelling    Pus or drainage (clear or slightly yellow drainage is ok)    Temperature over 100F    Spreading redness or warmth around wound    When will I hear about my results?  The biopsy results can take 2-3 weeks to come back. The clinic will call you with the results, send you a [employer] message, or have you schedule a follow-up clinic or phone time to discuss the results. Contact our clinics if you do not hear from us in 3 weeks.     Who should I call with questions?    [facility]: [phone]     [facility]: [phone]    For urgent needs outside of business hours call the [facility] at [phone] and ask for the dermatology resident on  call    _______________________________________________________________      Cryotherapy    What is it?    Use of a very cold liquid, such as liquid nitrogen, to freeze and destroy abnormal skin cells that need to be removed    What should I expect?    Tenderness and redness    A small blister that might grow and fill with dark purple blood. There may be crusting.    More than one treatment may be needed if the lesions do not go away.    How do I care for the treated area?    Gently wash the area with your hands when bathing.    Use a thin layer of Vaseline to help with healing. You may use a Band-Aid.     The area should heal within 7-10 days and may leave behind a pink or lighter color.     Do not use an antibiotic or Neosporin ointment.     You may take acetaminophen (Tylenol) for pain.     Call your Doctor if you have:    Severe pain    Signs of infection (warmth, redness, cloudy yellow drainage, and or a bad smell)    Questions or concerns    Who should I call with questions?       [facility]: [phone]       [facility]: [phone]       For urgent needs outside of business hours call the [facility] at [phone]        and ask for the dermatology resident on call

## 2017-08-07 ENCOUNTER — TELEPHONE (OUTPATIENT)
Dept: DERMATOLOGY | Facility: CLINIC | Age: 67
End: 2017-08-07

## 2017-08-07 LAB — COPATH REPORT: NORMAL

## 2017-08-07 NOTE — TELEPHONE ENCOUNTER
Pt returned call and notified of Dr. Borja's message above. Pt verbalizes understanding and  6 month appt on 12/19/16 verified with pt. Appt in February cancelled as appt not needed...Susanne Pina RN

## 2017-08-07 NOTE — PROGRESS NOTES
Please let Ms. Marshaalmazgricelda know the path result:    SCAR. The initial lesion is gone and there is only a scar.     We will keep an eye on the area. Dr. Felipe will do another skin check at her next follow up.    Damien Borja MD, MS    Department of Dermatology  [facility]: Phone: [phone], Fax:[phone]  [facility]: Phone: [phone], Fax: [phone]

## 2017-08-16 ENCOUNTER — INFUSION THERAPY VISIT (OUTPATIENT)
Dept: INFUSION THERAPY | Facility: CLINIC | Age: 67
End: 2017-08-16
Attending: INTERNAL MEDICINE
Payer: MEDICARE

## 2017-08-16 ENCOUNTER — HOSPITAL ENCOUNTER (OUTPATIENT)
Dept: LAB | Facility: CLINIC | Age: 67
Discharge: HOME | End: 2017-08-16
Attending: PHYSICIAN ASSISTANT | Admitting: PHYSICIAN ASSISTANT
Payer: MEDICARE

## 2017-08-16 ENCOUNTER — MYC MEDICAL ADVICE (OUTPATIENT)
Dept: FAMILY MEDICINE | Facility: OTHER | Age: 67
End: 2017-08-16

## 2017-08-16 ENCOUNTER — HOSPITAL ENCOUNTER (OUTPATIENT)
Dept: NUCLEAR MEDICINE | Facility: CLINIC | Age: 67
Setting detail: NUCLEAR MEDICINE
End: 2017-08-16
Attending: INTERNAL MEDICINE
Payer: MEDICARE

## 2017-08-16 DIAGNOSIS — C50.412 MALIGNANT NEOPLASM OF UPPER-OUTER QUADRANT OF LEFT FEMALE BREAST, UNSPECIFIED ESTROGEN RECEPTOR STATUS (H): ICD-10-CM

## 2017-08-16 DIAGNOSIS — C50.412 MALIGNANT NEOPLASM OF UPPER-OUTER QUADRANT OF LEFT FEMALE BREAST, UNSPECIFIED ESTROGEN RECEPTOR STATUS (H): Primary | ICD-10-CM

## 2017-08-16 DIAGNOSIS — Z51.11 ENCOUNTER FOR ANTINEOPLASTIC CHEMOTHERAPY: ICD-10-CM

## 2017-08-16 LAB
ALBUMIN SERPL-MCNC: 4 G/DL (ref 3.4–5)
ALP SERPL-CCNC: 66 U/L (ref 40–150)
ALT SERPL W P-5'-P-CCNC: 37 U/L (ref 0–50)
ANION GAP SERPL CALCULATED.3IONS-SCNC: 9 MMOL/L (ref 3–14)
AST SERPL W P-5'-P-CCNC: 32 U/L (ref 0–45)
BASOPHILS # BLD AUTO: 0 10E9/L (ref 0–0.2)
BASOPHILS NFR BLD AUTO: 0.3 %
BILIRUB SERPL-MCNC: 0.3 MG/DL (ref 0.2–1.3)
BUN SERPL-MCNC: 25 MG/DL (ref 7–30)
CALCIUM SERPL-MCNC: 10.4 MG/DL (ref 8.5–10.1)
CANCER AG27-29 SERPL-ACNC: 24 U/ML (ref 0–39)
CHLORIDE SERPL-SCNC: 101 MMOL/L (ref 94–109)
CO2 SERPL-SCNC: 30 MMOL/L (ref 20–32)
CREAT SERPL-MCNC: 0.95 MG/DL (ref 0.52–1.04)
DIFFERENTIAL METHOD BLD: ABNORMAL
EOSINOPHIL # BLD AUTO: 0.2 10E9/L (ref 0–0.7)
EOSINOPHIL NFR BLD AUTO: 2.4 %
ERYTHROCYTE [DISTWIDTH] IN BLOOD BY AUTOMATED COUNT: 13.2 % (ref 10–15)
GFR SERPL CREATININE-BSD FRML MDRD: 58 ML/MIN/1.7M2
GLUCOSE SERPL-MCNC: 102 MG/DL (ref 70–99)
HCT VFR BLD AUTO: 38.1 % (ref 35–47)
HGB BLD-MCNC: 12.2 G/DL (ref 11.7–15.7)
IMM GRANULOCYTES # BLD: 0 10E9/L (ref 0–0.4)
IMM GRANULOCYTES NFR BLD: 0.3 %
LYMPHOCYTES # BLD AUTO: 1.4 10E9/L (ref 0.8–5.3)
LYMPHOCYTES NFR BLD AUTO: 22.5 %
MCH RBC QN AUTO: 32.7 PG (ref 26.5–33)
MCHC RBC AUTO-ENTMCNC: 32 G/DL (ref 31.5–36.5)
MCV RBC AUTO: 102 FL (ref 78–100)
MONOCYTES # BLD AUTO: 0.4 10E9/L (ref 0–1.3)
MONOCYTES NFR BLD AUTO: 6.4 %
NEUTROPHILS # BLD AUTO: 4.2 10E9/L (ref 1.6–8.3)
NEUTROPHILS NFR BLD AUTO: 68.1 %
PLATELET # BLD AUTO: 265 10E9/L (ref 150–450)
POTASSIUM SERPL-SCNC: 3.8 MMOL/L (ref 3.4–5.3)
PROT SERPL-MCNC: 7.3 G/DL (ref 6.8–8.8)
RBC # BLD AUTO: 3.73 10E12/L (ref 3.8–5.2)
SODIUM SERPL-SCNC: 140 MMOL/L (ref 133–144)
WBC # BLD AUTO: 6.2 10E9/L (ref 4–11)

## 2017-08-16 PROCEDURE — 86300 IMMUNOASSAY TUMOR CA 15-3: CPT | Performed by: INTERNAL MEDICINE

## 2017-08-16 PROCEDURE — 78472 GATED HEART PLANAR SINGLE: CPT

## 2017-08-16 PROCEDURE — 34300033 ZZH RX 343: Performed by: INTERNAL MEDICINE

## 2017-08-16 PROCEDURE — 85025 COMPLETE CBC W/AUTO DIFF WBC: CPT | Performed by: INTERNAL MEDICINE

## 2017-08-16 PROCEDURE — A9560 TC99M LABELED RBC: HCPCS | Performed by: INTERNAL MEDICINE

## 2017-08-16 PROCEDURE — 80053 COMPREHEN METABOLIC PANEL: CPT | Performed by: INTERNAL MEDICINE

## 2017-08-16 RX ORDER — HEPARIN SODIUM (PORCINE) LOCK FLUSH IV SOLN 100 UNIT/ML 100 UNIT/ML
500 SOLUTION INTRAVENOUS EVERY 8 HOURS
Status: DISCONTINUED | OUTPATIENT
Start: 2017-08-16 | End: 2017-08-16 | Stop reason: HOSPADM

## 2017-08-16 RX ORDER — LORAZEPAM 2 MG/ML
.5-1 INJECTION INTRAMUSCULAR EVERY 6 HOURS PRN
Status: CANCELLED | OUTPATIENT
Start: 2017-08-23

## 2017-08-16 RX ORDER — ALBUTEROL SULFATE 90 UG/1
1-2 AEROSOL, METERED RESPIRATORY (INHALATION)
Status: CANCELLED
Start: 2017-08-23

## 2017-08-16 RX ORDER — SODIUM CHLORIDE 9 MG/ML
1000 INJECTION, SOLUTION INTRAVENOUS CONTINUOUS PRN
Status: CANCELLED
Start: 2017-08-23

## 2017-08-16 RX ORDER — PROCHLORPERAZINE MALEATE 5 MG
10 TABLET ORAL EVERY 6 HOURS PRN
Status: CANCELLED
Start: 2017-08-23

## 2017-08-16 RX ORDER — METHYLPREDNISOLONE SODIUM SUCCINATE 125 MG/2ML
125 INJECTION, POWDER, LYOPHILIZED, FOR SOLUTION INTRAMUSCULAR; INTRAVENOUS
Status: CANCELLED
Start: 2017-08-23

## 2017-08-16 RX ORDER — DIPHENHYDRAMINE HYDROCHLORIDE 50 MG/ML
50 INJECTION INTRAMUSCULAR; INTRAVENOUS
Status: CANCELLED
Start: 2017-08-23

## 2017-08-16 RX ORDER — LORAZEPAM 0.5 MG/1
.5-1 TABLET ORAL EVERY 6 HOURS PRN
Status: CANCELLED
Start: 2017-08-23

## 2017-08-16 RX ORDER — MEPERIDINE HYDROCHLORIDE 25 MG/ML
25 INJECTION INTRAMUSCULAR; INTRAVENOUS; SUBCUTANEOUS EVERY 30 MIN PRN
Status: CANCELLED | OUTPATIENT
Start: 2017-08-23

## 2017-08-16 RX ORDER — HEPARIN SODIUM (PORCINE) LOCK FLUSH IV SOLN 100 UNIT/ML 100 UNIT/ML
500 SOLUTION INTRAVENOUS EVERY 8 HOURS
Status: CANCELLED
Start: 2017-08-16

## 2017-08-16 RX ORDER — ALBUTEROL SULFATE 0.83 MG/ML
2.5 SOLUTION RESPIRATORY (INHALATION)
Status: CANCELLED | OUTPATIENT
Start: 2017-08-23

## 2017-08-16 RX ORDER — ACETAMINOPHEN 325 MG/1
650 TABLET ORAL
Status: CANCELLED
Start: 2017-08-23

## 2017-08-16 RX ADMIN — Medication 27.6 MCI.: at 14:20

## 2017-08-16 RX ADMIN — SODIUM CHLORIDE, PRESERVATIVE FREE 500 UNITS: 5 INJECTION INTRAVENOUS at 12:46

## 2017-08-16 NOTE — PATIENT INSTRUCTIONS
Pt to return on 8/23/17 for Herceptin. Copies of medication list and upcoming appointments given prior to discharge.

## 2017-08-16 NOTE — PROGRESS NOTES
Patient here for port labs today. Port accessed, labs drawn then flushed port per protocol. Discharged in stable condition.

## 2017-08-16 NOTE — MR AVS SNAPSHOT
After Visit Summary   8/16/2017    Michaela Dorman    MRN: [id number]           Patient Information     Date Of Birth          1950        Visit Information        Provider Department      8/16/2017 12:30 PM NL INFUSION CHAIR 2 [facility]        Today's Diagnoses     Malignant neoplasm of upper-outer quadrant of left female breast, unspecified estrogen receptor status (H)    -  1      Care Instructions    Pt to return on 8/23/17 for Herceptin. Copies of medication list and upcoming appointments given prior to discharge.             Follow-ups after your visit        Your next 10 appointments already scheduled     Aug 17, 2017  1:45 PM CDT   Ortho Treatment with Radha Preston PT   [facility] ([facility])    [address] [place] [address] Dr Caio [place] [address]   [phone]            Aug 22, 2017 11:30 AM CDT   [facility] Visit with Syeda Fegruson MD   [facility]    [address] [place] [address]   [phone]            Aug 23, 2017 11:30 AM CDT   (Arrive by 11:00 AM)   Level 2 with NL INFUSION CHAIR 5   [facility]fusion [facility] ([facility])    [address] [place] [address] Dr Caio [place] [address]   [phone]            Aug 24, 2017 10:45 AM CDT   Ortho Treatment with Radha Preston PT   [facility] ([facility])    [address] [place] [address] Dr Caio [place] [address]   [phone]            Sep 12, 2017  1:00 PM CDT   Level 2 with NL INFUSION CHAIR 3   [facility] ([facility])    [address] [place] [address] Dr Caio [place] [address]   [phone]            Dec 19, 2017 12:30 PM CST   Return Visit with Loren Felipe MD   [facility])    [address] [place] [id number] [phone]              Who to contact     If you have questions or need  follow up information about today's clinic visit or your schedule please contact [facility] INFUSION SERVICES directly at [phone].  Normal or non-critical lab and imaging results will be communicated to you by MyChart, letter or phone within 4 business days after the clinic has received the results. If you do not hear from us within 7 days, please contact the clinic through Verslyhart or phone. If you have a critical or abnormal lab result, we will notify you by phone as soon as possible.  Submit refill requests through Availendar or call your pharmacy and they will forward the refill request to us. Please allow 3 business days for your refill to be completed.          Additional Information About Your Visit        VerslyharSweepery Information     Availendar gives you secure access to your electronic health record. If you see a primary care provider, you can also send messages to your care team and make appointments. If you have questions, please call your primary care clinic.  If you do not have a primary care provider, please call [phone] and they will assist you.        Care EveryWhere ID     This is your Care EveryWhere ID. This could be used by other organizations to access your [facility] medical records  [id number]         Blood Pressure from Last 3 Encounters:   08/01/17 148/76   07/11/17 130/74   07/11/17 126/73    Weight from Last 3 Encounters:   07/11/17 64.6 kg (142 lb 8 oz)   06/20/17 64.4 kg (142 lb)   05/30/17 64.5 kg (142 lb 1.6 oz)              Today, you had the following     No orders found for display       Primary Care Provider Office Phone # Fax #    Phani Tapia PA-C [phone]       [address] [facility] [address]        Equal Access to Services     [facility]: Hadii santa le Sojose, waaxda luqadaha, qaybta kaalmada ademaeyajackson, wale pena. So [facility] [phone].    ATENCIÓN: Si habla español, tiene a ordoñez disposición servicios gratuitos de  asistencia lingüística. Shaneka al [phone].    We comply with applicable federal civil rights laws and [place] laws. We do not discriminate on the basis of race, color, national origin, age, disability sex, sexual orientation or gender identity.            Thank you!     Thank you for choosing [facility]  for your care. Our goal is always to provide you with excellent care. Hearing back from our patients is one way we can continue to improve our services. Please take a few minutes to complete the written survey that you may receive in the mail after your visit with us. Thank you!             Your Updated Medication List - Protect others around you: Learn how to safely use, store and throw away your medicines at www.disposemymeds.org.          This list is accurate as of: 8/16/17  3:40 PM.  Always use your most recent med list.                   Brand Name Dispense Instructions for use Diagnosis    * FLUoxetine 20 MG capsule    PROzac    270 capsule    TAKE THREE CAPSULES BY MOUTH EVERY DAY TITRATE AS DIRECTED    Adjustment disorder with depressed mood       * FLUoxetine 20 MG capsule    PROzac    270 capsule    TAKE THREE CAPSULES BY MOUTH EVERY DAY TITRATE AS DIRECTED    Adjustment disorder with depressed mood       hydrochlorothiazide 25 MG tablet    HYDRODIURIL    90 tablet    Take 1 tablet (25 mg) by mouth daily    Essential hypertension       LORazepam 0.5 MG tablet    ATIVAN    15 tablet    Take 1 tablet (0.5 mg) by mouth every 4 hours as needed (Anxiety, Nausea/Vomiting or Sleep)    Malignant neoplasm of upper-outer quadrant of left female breast (H), Encounter for antineoplastic chemotherapy       oxybutynin 5 MG tablet    DITROPAN    90 tablet    TAKE ONE-HALF TABLET BY MOUTH TWICE A DAY    Dysuria       oxyCODONE-acetaminophen 5-325 MG per tablet    PERCOCET    20 tablet    Take 1 tablet by mouth every 4 hours as needed for pain    Post-op pain, S/p reverse total shoulder  arthroplasty, right, S/P bilateral mastectomy, Trochanteric bursitis, unspecified laterality       priLOSEC 20 MG CR capsule   Generic drug:  omeprazole      Take by mouth daily        simvastatin 20 MG tablet    ZOCOR    30 tablet    TAKE ONE TABLET BY MOUTH AT BEDTIME    Hyperlipidemia with target LDL less than 130       TYLENOL EX ST ARTHRITIS PAIN 500 MG tablet   Generic drug:  acetaminophen      Take 500-1,000 mg by mouth every 6 hours as needed for mild pain    Dermatitis due to sun       venlafaxine 37.5 MG 24 hr capsule    EFFEXOR XR    90 capsule    Take 1 capsule (37.5 mg) by mouth daily    Adjustment disorder with depressed mood       * Notice:  This list has 2 medication(s) that are the same as other medications prescribed for you. Read the directions carefully, and ask your doctor or other care provider to review them with you.

## 2017-08-17 DIAGNOSIS — C50.412 MALIGNANT NEOPLASM OF UPPER-OUTER QUADRANT OF LEFT FEMALE BREAST (H): ICD-10-CM

## 2017-08-17 DIAGNOSIS — Z51.11 ENCOUNTER FOR ANTINEOPLASTIC CHEMOTHERAPY: ICD-10-CM

## 2017-08-17 RX ORDER — PROCHLORPERAZINE MALEATE 10 MG
10 TABLET ORAL EVERY 6 HOURS PRN
Qty: 30 TABLET | Refills: 3 | Status: SHIPPED | OUTPATIENT
Start: 2017-08-17 | End: 2018-01-27

## 2017-08-17 NOTE — PATIENT INSTRUCTIONS
Prochlorperazine      Last Written Prescription Date: 02/07/2017   Last Fill Quantity: 30,  # refills: 3   Last Office Visit with [facility], [facility] or [facility] prescribing provider: 08/02/2017                                         Next 5 appointments (look out 90 days)     Aug 22, 2017 11:30 AM CDT   [facility]   [facility]    [address] [place] [address] [phone]                    Thanks  Luz Marina [facility]   [phone]

## 2017-08-20 DIAGNOSIS — E78.5 HYPERLIPIDEMIA WITH TARGET LDL LESS THAN 130: ICD-10-CM

## 2017-08-21 NOTE — TELEPHONE ENCOUNTER
zocor     Last Written Prescription Date: 7/6/17  Last Fill Quantity: 30, # refills: 0  Last Office Visit with [facility] or [facility] prescribing provider: 5/9/17       Lab Results   Component Value Date    CHOL 218 08/02/2010     Lab Results   Component Value Date    HDL 72 08/02/2010     Lab Results   Component Value Date     08/31/2016     08/02/2010     Lab Results   Component Value Date    TRIG 125 08/02/2010     Lab Results   Component Value Date    CHOLHDLRATIO 3.0 08/02/2010

## 2017-08-22 ENCOUNTER — MYC MEDICAL ADVICE (OUTPATIENT)
Dept: FAMILY MEDICINE | Facility: OTHER | Age: 67
End: 2017-08-22

## 2017-08-22 DIAGNOSIS — R82.90 NONSPECIFIC FINDING ON EXAMINATION OF URINE: ICD-10-CM

## 2017-08-22 DIAGNOSIS — R30.0 DYSURIA: ICD-10-CM

## 2017-08-22 DIAGNOSIS — N30.00 ACUTE CYSTITIS WITHOUT HEMATURIA: ICD-10-CM

## 2017-08-22 RX ORDER — SIMVASTATIN 20 MG
TABLET ORAL
Qty: 30 TABLET | Refills: 0 | Status: SHIPPED | OUTPATIENT
Start: 2017-08-22 | End: 2017-10-03

## 2017-08-22 RX ORDER — CIPROFLOXACIN 500 MG/1
500 TABLET, FILM COATED ORAL 2 TIMES DAILY
Qty: 14 TABLET | Refills: 0 | Status: SHIPPED | OUTPATIENT
Start: 2017-08-22 | End: 2017-09-25

## 2017-08-22 NOTE — TELEPHONE ENCOUNTER
Routing refill request to provider for review/approval because:  Labs out of range:  FLP  Labs not current:  JOSE Barriga RN  [facility]

## 2017-08-23 ENCOUNTER — ONCOLOGY VISIT (OUTPATIENT)
Dept: ONCOLOGY | Facility: CLINIC | Age: 67
End: 2017-08-23
Payer: COMMERCIAL

## 2017-08-23 ENCOUNTER — INFUSION THERAPY VISIT (OUTPATIENT)
Dept: INFUSION THERAPY | Facility: CLINIC | Age: 67
End: 2017-08-23
Attending: PHYSICIAN ASSISTANT
Payer: MEDICARE

## 2017-08-23 VITALS
HEART RATE: 82 BPM | BODY MASS INDEX: 23.03 KG/M2 | DIASTOLIC BLOOD PRESSURE: 52 MMHG | WEIGHT: 142.7 LBS | SYSTOLIC BLOOD PRESSURE: 121 MMHG

## 2017-08-23 VITALS
TEMPERATURE: 97.9 F | BODY MASS INDEX: 22.93 KG/M2 | HEIGHT: 66 IN | SYSTOLIC BLOOD PRESSURE: 118 MMHG | DIASTOLIC BLOOD PRESSURE: 64 MMHG | WEIGHT: 142.7 LBS | HEART RATE: 89 BPM | OXYGEN SATURATION: 96 %

## 2017-08-23 DIAGNOSIS — C50.412 MALIGNANT NEOPLASM OF UPPER-OUTER QUADRANT OF LEFT FEMALE BREAST, UNSPECIFIED ESTROGEN RECEPTOR STATUS (H): ICD-10-CM

## 2017-08-23 DIAGNOSIS — C50.412 MALIGNANT NEOPLASM OF UPPER-OUTER QUADRANT OF LEFT BREAST IN FEMALE, ESTROGEN RECEPTOR NEGATIVE (H): Primary | ICD-10-CM

## 2017-08-23 DIAGNOSIS — Z51.11 ENCOUNTER FOR ANTINEOPLASTIC CHEMOTHERAPY: Primary | ICD-10-CM

## 2017-08-23 DIAGNOSIS — Z17.1 MALIGNANT NEOPLASM OF UPPER-OUTER QUADRANT OF LEFT BREAST IN FEMALE, ESTROGEN RECEPTOR NEGATIVE (H): Primary | ICD-10-CM

## 2017-08-23 PROCEDURE — 25000128 H RX IP 250 OP 636: Performed by: INTERNAL MEDICINE

## 2017-08-23 PROCEDURE — 99214 OFFICE O/P EST MOD 30 MIN: CPT | Performed by: INTERNAL MEDICINE

## 2017-08-23 PROCEDURE — 96413 CHEMO IV INFUSION 1 HR: CPT

## 2017-08-23 RX ORDER — ALBUTEROL SULFATE 90 UG/1
1-2 AEROSOL, METERED RESPIRATORY (INHALATION)
Status: CANCELLED
Start: 2017-10-25

## 2017-08-23 RX ORDER — MEPERIDINE HYDROCHLORIDE 25 MG/ML
25 INJECTION INTRAMUSCULAR; INTRAVENOUS; SUBCUTANEOUS EVERY 30 MIN PRN
Status: CANCELLED | OUTPATIENT
Start: 2017-10-25

## 2017-08-23 RX ORDER — SODIUM CHLORIDE 9 MG/ML
1000 INJECTION, SOLUTION INTRAVENOUS CONTINUOUS PRN
Status: CANCELLED
Start: 2017-10-04

## 2017-08-23 RX ORDER — DIPHENHYDRAMINE HYDROCHLORIDE 50 MG/ML
50 INJECTION INTRAMUSCULAR; INTRAVENOUS
Status: CANCELLED
Start: 2017-09-13

## 2017-08-23 RX ORDER — DIPHENHYDRAMINE HYDROCHLORIDE 50 MG/ML
50 INJECTION INTRAMUSCULAR; INTRAVENOUS
Status: CANCELLED
Start: 2017-10-25

## 2017-08-23 RX ORDER — DIPHENHYDRAMINE HYDROCHLORIDE 50 MG/ML
50 INJECTION INTRAMUSCULAR; INTRAVENOUS
Status: CANCELLED
Start: 2017-10-04

## 2017-08-23 RX ORDER — PROCHLORPERAZINE MALEATE 5 MG
10 TABLET ORAL EVERY 6 HOURS PRN
Status: CANCELLED
Start: 2017-10-25

## 2017-08-23 RX ORDER — HEPARIN SODIUM (PORCINE) LOCK FLUSH IV SOLN 100 UNIT/ML 100 UNIT/ML
500 SOLUTION INTRAVENOUS EVERY 8 HOURS
Status: CANCELLED
Start: 2017-08-23

## 2017-08-23 RX ORDER — MEPERIDINE HYDROCHLORIDE 25 MG/ML
25 INJECTION INTRAMUSCULAR; INTRAVENOUS; SUBCUTANEOUS EVERY 30 MIN PRN
Status: CANCELLED | OUTPATIENT
Start: 2017-09-13

## 2017-08-23 RX ORDER — EPINEPHRINE 1 MG/ML
0.3 INJECTION INTRAMUSCULAR; INTRAVENOUS; SUBCUTANEOUS EVERY 5 MIN PRN
Status: CANCELLED | OUTPATIENT
Start: 2017-10-04

## 2017-08-23 RX ORDER — LORAZEPAM 2 MG/ML
.5-1 INJECTION INTRAMUSCULAR EVERY 6 HOURS PRN
Status: CANCELLED | OUTPATIENT
Start: 2017-10-04

## 2017-08-23 RX ORDER — PROCHLORPERAZINE MALEATE 5 MG
10 TABLET ORAL EVERY 6 HOURS PRN
Status: CANCELLED
Start: 2017-10-04

## 2017-08-23 RX ORDER — ALBUTEROL SULFATE 90 UG/1
1-2 AEROSOL, METERED RESPIRATORY (INHALATION)
Status: CANCELLED
Start: 2017-09-13

## 2017-08-23 RX ORDER — LORAZEPAM 0.5 MG/1
.5-1 TABLET ORAL EVERY 6 HOURS PRN
Status: CANCELLED
Start: 2017-10-25

## 2017-08-23 RX ORDER — MEPERIDINE HYDROCHLORIDE 25 MG/ML
25 INJECTION INTRAMUSCULAR; INTRAVENOUS; SUBCUTANEOUS EVERY 30 MIN PRN
Status: CANCELLED | OUTPATIENT
Start: 2017-10-04

## 2017-08-23 RX ORDER — ALBUTEROL SULFATE 0.83 MG/ML
2.5 SOLUTION RESPIRATORY (INHALATION)
Status: CANCELLED | OUTPATIENT
Start: 2017-10-25

## 2017-08-23 RX ORDER — METHYLPREDNISOLONE SODIUM SUCCINATE 125 MG/2ML
125 INJECTION, POWDER, LYOPHILIZED, FOR SOLUTION INTRAMUSCULAR; INTRAVENOUS
Status: CANCELLED
Start: 2017-10-04

## 2017-08-23 RX ORDER — SODIUM CHLORIDE 9 MG/ML
1000 INJECTION, SOLUTION INTRAVENOUS CONTINUOUS PRN
Status: CANCELLED
Start: 2017-10-25

## 2017-08-23 RX ORDER — EPINEPHRINE 1 MG/ML
0.3 INJECTION INTRAMUSCULAR; INTRAVENOUS; SUBCUTANEOUS EVERY 5 MIN PRN
Status: CANCELLED | OUTPATIENT
Start: 2017-09-13

## 2017-08-23 RX ORDER — ALBUTEROL SULFATE 0.83 MG/ML
2.5 SOLUTION RESPIRATORY (INHALATION)
Status: CANCELLED | OUTPATIENT
Start: 2017-10-04

## 2017-08-23 RX ORDER — ACETAMINOPHEN 325 MG/1
650 TABLET ORAL
Status: CANCELLED
Start: 2017-09-13

## 2017-08-23 RX ORDER — ACETAMINOPHEN 325 MG/1
650 TABLET ORAL
Status: CANCELLED
Start: 2017-10-04

## 2017-08-23 RX ORDER — PROCHLORPERAZINE MALEATE 5 MG
10 TABLET ORAL EVERY 6 HOURS PRN
Status: CANCELLED
Start: 2017-09-13

## 2017-08-23 RX ORDER — SODIUM CHLORIDE 9 MG/ML
1000 INJECTION, SOLUTION INTRAVENOUS CONTINUOUS PRN
Status: CANCELLED
Start: 2017-09-13

## 2017-08-23 RX ORDER — METHYLPREDNISOLONE SODIUM SUCCINATE 125 MG/2ML
125 INJECTION, POWDER, LYOPHILIZED, FOR SOLUTION INTRAMUSCULAR; INTRAVENOUS
Status: CANCELLED
Start: 2017-10-25

## 2017-08-23 RX ORDER — ALBUTEROL SULFATE 0.83 MG/ML
2.5 SOLUTION RESPIRATORY (INHALATION)
Status: CANCELLED | OUTPATIENT
Start: 2017-09-13

## 2017-08-23 RX ORDER — METHYLPREDNISOLONE SODIUM SUCCINATE 125 MG/2ML
125 INJECTION, POWDER, LYOPHILIZED, FOR SOLUTION INTRAMUSCULAR; INTRAVENOUS
Status: CANCELLED
Start: 2017-09-13

## 2017-08-23 RX ORDER — HEPARIN SODIUM (PORCINE) LOCK FLUSH IV SOLN 100 UNIT/ML 100 UNIT/ML
500 SOLUTION INTRAVENOUS EVERY 8 HOURS
Status: DISCONTINUED | OUTPATIENT
Start: 2017-08-23 | End: 2017-08-23 | Stop reason: HOSPADM

## 2017-08-23 RX ORDER — ALBUTEROL SULFATE 90 UG/1
1-2 AEROSOL, METERED RESPIRATORY (INHALATION)
Status: CANCELLED
Start: 2017-10-04

## 2017-08-23 RX ORDER — LORAZEPAM 0.5 MG/1
.5-1 TABLET ORAL EVERY 6 HOURS PRN
Status: CANCELLED
Start: 2017-10-04

## 2017-08-23 RX ORDER — LORAZEPAM 2 MG/ML
.5-1 INJECTION INTRAMUSCULAR EVERY 6 HOURS PRN
Status: CANCELLED | OUTPATIENT
Start: 2017-09-13

## 2017-08-23 RX ORDER — ACETAMINOPHEN 325 MG/1
650 TABLET ORAL
Status: CANCELLED
Start: 2017-10-25

## 2017-08-23 RX ORDER — LORAZEPAM 2 MG/ML
.5-1 INJECTION INTRAMUSCULAR EVERY 6 HOURS PRN
Status: CANCELLED | OUTPATIENT
Start: 2017-10-25

## 2017-08-23 RX ORDER — LORAZEPAM 0.5 MG/1
.5-1 TABLET ORAL EVERY 6 HOURS PRN
Status: CANCELLED
Start: 2017-09-13

## 2017-08-23 RX ORDER — EPINEPHRINE 1 MG/ML
0.3 INJECTION INTRAMUSCULAR; INTRAVENOUS; SUBCUTANEOUS EVERY 5 MIN PRN
Status: CANCELLED | OUTPATIENT
Start: 2017-10-25

## 2017-08-23 RX ADMIN — SODIUM CHLORIDE, PRESERVATIVE FREE 500 UNITS: 5 INJECTION INTRAVENOUS at 13:10

## 2017-08-23 RX ADMIN — TRASTUZUMAB 380 MG: 150 INJECTION, POWDER, LYOPHILIZED, FOR SOLUTION INTRAVENOUS at 12:23

## 2017-08-23 ASSESSMENT — PAIN SCALES - GENERAL: PAINLEVEL: NO PAIN (0)

## 2017-08-23 NOTE — PROGRESS NOTES
FOLLOW-UP VISIT NOTE    PATIENT NAME: Michaela Dorman MRN # [id number]  DATE OF VISIT: Aug 23, 2017 YOB: 1950    REFERRING PROVIDER: No referring provider defined for this encounter.    CANCER TYPE:  BREAST      SUBJECTIVE     Michaela Dorman is a 66 year old female.  Hx:  1.  9/16 diagnosed with stage 3 hormone receptor negative, Her2 positive breast cancer on the left.  Excisional bx axillary node positive, MRI breast with multicentric disease.  PET scan axillary uptake only, not breast  2.  Neoadjuvant chemo with DD AC followed by DD taxol and herceptin  3.  12/5/16 followup breast MRI with decrease size multiple masses left breast  4.  2/8/17  Bilateral mastectomies.  Rt neg.  Left no invasive ca, small area DCIS.  1 node neg.  5.  Chest wall and geraldine XRT [facility]  6.  Ongoing herceptin to complete a year in Dec 2017.       Michaela is doing well.  No complaints.  Recent ECHO 52% EF.  Labs normal.     Of note 3/5 females in her family have had breast ca.  All 3 tested and neg for BRCA per pt.        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     Subdural hematoma (H)            CURRENT OUTPATIENT MEDICATIONS     Current Outpatient Prescriptions   Medication Sig Dispense Refill     simvastatin (ZOCOR) 20 MG tablet TAKE ONE TABLET BY MOUTH AT BEDTIME--NEEDS CHOLESTEROL CHECK PRIOR TO FURTHER REFILLS 30 tablet 0     ciprofloxacin (CIPRO) 500 MG tablet Take 1 tablet (500 mg) by mouth 2 times daily 14 tablet 0     prochlorperazine (COMPAZINE) 10 MG tablet Take 1 tablet (10 mg) by mouth every 6 hours as needed (Nausea/Vomiting) 30 tablet 3     oxyCODONE-acetaminophen (PERCOCET) 5-325 MG per tablet Take 1 tablet by  mouth every 4 hours as needed for pain 20 tablet 0     simvastatin (ZOCOR) 20 MG tablet TAKE ONE TABLET BY MOUTH AT BEDTIME 30 tablet 0     oxybutynin (DITROPAN) 5 MG tablet TAKE ONE-HALF TABLET BY MOUTH TWICE A DAY 90 tablet 0     FLUoxetine (PROZAC) 20 MG capsule TAKE THREE CAPSULES BY MOUTH EVERY DAY TITRATE AS DIRECTED 270 capsule 0     hydrochlorothiazide (HYDRODIURIL) 25 MG tablet Take 1 tablet (25 mg) by mouth daily 90 tablet 1     venlafaxine (EFFEXOR XR) 37.5 MG 24 hr capsule Take 1 capsule (37.5 mg) by mouth daily 90 capsule 1     LORazepam (ATIVAN) 0.5 MG tablet Take 1 tablet (0.5 mg) by mouth every 4 hours as needed (Anxiety, Nausea/Vomiting or Sleep) 15 tablet 0     FLUoxetine (PROZAC) 20 MG capsule TAKE THREE CAPSULES BY MOUTH EVERY DAY TITRATE AS DIRECTED 270 capsule 0     acetaminophen (TYLENOL EX ST ARTHRITIS PAIN) 500 MG tablet Take 500-1,000 mg by mouth every 6 hours as needed for mild pain       omeprazole (PRILOSEC) 20 MG capsule Take by mouth daily          ALLERGIES     Allergies   Allergen Reactions     No Known Drug Allergies         REVIEW OF SYSTEMS   As above in the HPI, remainder of the review of systems is negative.     PHYSICAL EXAM   B/P: 118/64, T: 97.9, P: 89, R: Data Unavailable  SpO2 Readings from Last 4 Encounters:   08/23/17 96%   07/11/17 94%   05/30/17 97%   05/09/17 98%     Wt Readings from Last 3 Encounters:   08/23/17 64.7 kg (142 lb 11.2 oz)   07/11/17 64.6 kg (142 lb 8 oz)   06/20/17 64.4 kg (142 lb)     GEN: NAD  HEENT: Eyes visibly normal, no icterus, injection or pallor. Oropharynx is clear.  NECK: no cervical or supraclavicular lymphadenopathy  LUNGS: clear bilaterally  CV: regular, no murmurs, rubs, or gallops  ABDOMEN: soft, non-tender, non-distended, normal bowel sounds, no hepatosplenomegaly by percussion or palpation  EXT: warm, well perfused, no edema  NEURO: alert  SKIN: no rashes   LABORATORY AND IMAGING STUDIES     Recent Labs   Lab Test  08/16/17   1252   05/30/17   1338   NA  140  141   POTASSIUM  3.8  3.6   CHLORIDE  101  106   BUN  25  23   CR  0.95  0.80   GLC  102*  91   VANDANA  10.4*  9.8     Recent Labs   Lab Test  08/16/17   1252  05/30/17   1338  02/14/17   1300   WBC  6.2  5.7  7.9   HGB  12.2  11.3*  10.6*   PLT  265  271  284   MCV  102*  98  99   NEUTROPHIL  68.1  74.3  61.7     Recent Labs   Lab Test  08/16/17   1252  05/30/17   1338  01/24/17   1016   BILITOTAL  0.3  0.2  0.4   ALKPHOS  66  55  80   ALT  37  41  49   AST  32  30  22   ALBUMIN  4.0  3.9  4.3     TSH   Date Value Ref Range Status   01/24/2017 1.34 0.40 - 4.00 mU/L Final   ]    All laboratory data reviewed    Results for orders placed or performed during the hospital encounter of 08/16/17   NM MUGA rest test    Narrative    7NUCLEAR MEDICINE HEART MUGA REST  8/16/2017  2:17 PM    INDICATION:  Encounter for antineoplastic chemotherapy     Additional Information: none    TECHNIQUE: The patient's red blood cells were labeled with 27.60 mCi  of Tc-99m pertechnetate. Anterior, BREONNA, and left lateral gated views  are obtained of the heart. The left ventricular ejection fraction was  calculated.    FINDINGS:    COMPARISON: Nuclear medicine MUGA scan dated 4/19/2017 and 10/7/2016.  Chest x-rays dated 10/7/2016.    The left ventricular ejection fraction is normal at 52.1%. Ejection  fraction was 50.7% on 4/19/2017 and 58.8% on 10/7/2016.    There is normal left ventricular size.  There is normal wall motion of  the left ventricle. The right ventricle is moving normally. The  pulmonary arteries appear normal.    The wall motion on all views appears to be within normal limits.      Impression    IMPRESSION:  1. Normal left ventricular ejection fraction at 52.1%.  2. Normal chamber size and wall motion    -----------------------  Normal Ejection Fraction Value Ranges:  Male LV EF% - 50-78%,   Female LVEF% - 50-87%    LENA ROME MD         ASSESSMENT AND PLAN     1.  Stage 3 breast cancer, Her2  positive, HR neg.  DAGOBERTO.  Pathologic CR to neoadjuvant rx.      Plan - continue q3 wk herceptin until 12/17.  RTC in 9 wks.  Time of encounter 25 min, more than half in counseling    Oskar Santoro MD

## 2017-08-23 NOTE — NURSING NOTE
"Oncology Rooming Note    August 23, 2017 11:06 AM   Michaela Dorman is a 66 year old female who presents for:    Chief Complaint   Patient presents with     Breast Problem     6 week follow up has infusion today     Initial Vitals: /64 (BP Location: Right arm, Patient Position: Sitting, Cuff Size: Adult Small)  Pulse 89  Temp 97.9  F (36.6  C)  Ht 1.676 m (5' 6\")  Wt 64.7 kg (142 lb 11.2 oz)  SpO2 96%  BMI 23.03 kg/m2 Estimated body mass index is 23.03 kg/(m^2) as calculated from the following:    Height as of this encounter: 1.676 m (5' 6\").    Weight as of this encounter: 64.7 kg (142 lb 11.2 oz). Body surface area is 1.74 meters squared.  No Pain (0) Comment: Data Unavailable   No LMP recorded. Patient is postmenopausal.  Allergies reviewed: Yes  Medications reviewed: Yes    Medications: Medication refills not needed today.  Pharmacy name entered into EPIC:    [facility] [address] [place] [address] - [address]  [facility] [place], [place] - [address] [facility] [place], [place] - [address] [facility]     Clinical concerns: day to of cipro for uti and has infusion today Dr. Santoro was notified.    10 minutes for nursing intake (face to face time)     April Frances, [facility]              "

## 2017-08-23 NOTE — MR AVS SNAPSHOT
After Visit Summary   8/23/2017    Michaela Dorman    MRN: [id number]           Patient Information     Date Of Birth          1950        Visit Information        Provider Department      8/23/2017 11:30 AM NL INFUSION CHAIR 5 [facility] Infusion [facility]        Today's Diagnoses     Encounter for antineoplastic chemotherapy    -  1    Malignant neoplasm of upper-outer quadrant of left female breast, unspecified estrogen receptor status (H)          Care Instructions    Pt to return on 9/12/17 for Herceptin. Copies of medication list and upcoming appointments given prior to discharge.           Follow-ups after your visit        Your next 10 appointments already scheduled     Aug 24, 2017 10:45 AM CDT   Ortho Treatment with Radha Preston PT   [facility] Therapy ([facility])    [address] [place] [address] Dr Caio [place] [address]   [phone]            Sep 12, 2017  1:00 PM CDT   Level 2 with NL INFUSION CHAIR 3   [facility] Infusion Services ([facility])    [address] [place] [address] Dr Caio [place] [address]   [phone]            Oct 03, 2017 11:00 AM CDT   Level 2 with NL INFUSION CHAIR 3   [facility] In[facility] ([facility])    [address] [place] [address] Dr Caio ALICEA [address]   [phone]            Oct 24, 2017 10:30 AM CDT   Return Visit with Syeda Ferguson MD   [facility]    [address] [facility] [address] [address] [place] [address]   [phone]            Oct 24, 2017 11:00 AM CDT   Level 2 with NL INFUSION CHAIR 3   [facility] Infusion [facility] ([facility])    [address] [facility] [address] Caio [place] [address]   [phone]            Nov 14, 2017 11:00 AM CST   Level 2 with NL INFUSION CHAIR 2   [facility] ([facility])    [address] [facility] [address] [place] [address]   [phone]            Dec 19, 2017 12:30 PM CST    Return Visit with Loren Felipe MD   [facility]    [address] [place] [address] [phone]              Future tests that were ordered for you today     Open Future Orders        Priority Expected Expires Ordered    CBC with platelets Routine 10/25/2017 8/23/2018 8/23/2017    Comprehensive metabolic panel Routine 10/25/2017 8/23/2018 8/23/2017            Who to contact     If you have questions or need follow up information about today's clinic visit or your schedule please contact [facility] INFUSION SERVICES directly at [phone].  Normal or non-critical lab and imaging results will be communicated to you by MyChart, letter or phone within 4 business days after the clinic has received the results. If you do not hear from us within 7 days, please contact the clinic through CICCWORLDhart or phone. If you have a critical or abnormal lab result, we will notify you by phone as soon as possible.  Submit refill requests through Palm or call your pharmacy and they will forward the refill request to us. Please allow 3 business days for your refill to be completed.          Additional Information About Your Visit        CICCWORLDharElonics Information     Palm gives you secure access to your electronic health record. If you see a primary care provider, you can also send messages to your care team and make appointments. If you have questions, please call your primary care clinic.  If you do not have a primary care provider, please call [phone] and they will assist you.        Care EveryWhere ID     This is your Care EveryWhere ID. This could be used by other organizations to access your [facility] medical records  [id number]        Your Vitals Were     Pulse BMI (Body Mass Index)                82 23.03 kg/m2           Blood Pressure from Last 3 Encounters:   08/23/17 121/52   08/23/17 118/64   08/01/17 148/76    Weight from Last 3 Encounters:    08/23/17 64.7 kg (142 lb 11.2 oz)   08/23/17 64.7 kg (142 lb 11.2 oz)   07/11/17 64.6 kg (142 lb 8 oz)              Today, you had the following     No orders found for display       Primary Care Provider Office Phone # Fax #    Phani Tapia PA-C [phone]       [address] [facility] [address]        Equal Access to Services     SHIVANI ARTIS : Hadii aad ku hadasho Soomaali, waaxda luqadaha, qaybta kaalmada adeegyada, waxay idiin hayaan adeeg lillyadairethan lazhanna . So [facility] [phone].    ATENCIÓN: Si jose manuella espdavidson, tiene a ordoñez disposición servicios gratuitos de asistencia lingüística. [facility] [phone].    We comply with applicable federal civil rights laws and [place] laws. We do not discriminate on the basis of race, color, national origin, age, disability sex, sexual orientation or gender identity.            Thank you!     Thank you for choosing [facility] INFUSION SERVICES  for your care. Our goal is always to provide you with excellent care. Hearing back from our patients is one way we can continue to improve our services. Please take a few minutes to complete the written survey that you may receive in the mail after your visit with us. Thank you!             Your Updated Medication List - Protect others around you: Learn how to safely use, store and throw away your medicines at www.disposemymeds.org.          This list is accurate as of: 8/23/17  2:19 PM.  Always use your most recent med list.                   Brand Name Dispense Instructions for use Diagnosis    ciprofloxacin 500 MG tablet    CIPRO    14 tablet    Take 1 tablet (500 mg) by mouth 2 times daily    Dysuria, Nonspecific finding on examination of urine, Acute cystitis without hematuria       * FLUoxetine 20 MG capsule    PROzac    270 capsule    TAKE THREE CAPSULES BY MOUTH EVERY DAY TITRATE AS DIRECTED    Adjustment disorder with depressed mood       * FLUoxetine 20 MG capsule    PROzac    270 capsule    TAKE THREE  CAPSULES BY MOUTH EVERY DAY TITRATE AS DIRECTED    Adjustment disorder with depressed mood       hydrochlorothiazide 25 MG tablet    HYDRODIURIL    90 tablet    Take 1 tablet (25 mg) by mouth daily    Essential hypertension       LORazepam 0.5 MG tablet    ATIVAN    15 tablet    Take 1 tablet (0.5 mg) by mouth every 4 hours as needed (Anxiety, Nausea/Vomiting or Sleep)    Malignant neoplasm of upper-outer quadrant of left female breast (H), Encounter for antineoplastic chemotherapy       oxybutynin 5 MG tablet    DITROPAN    90 tablet    TAKE ONE-HALF TABLET BY MOUTH TWICE A DAY    Dysuria       oxyCODONE-acetaminophen 5-325 MG per tablet    PERCOCET    20 tablet    Take 1 tablet by mouth every 4 hours as needed for pain    Post-op pain, S/p reverse total shoulder arthroplasty, right, S/P bilateral mastectomy, Trochanteric bursitis, unspecified laterality       priLOSEC 20 MG CR capsule   Generic drug:  omeprazole      Take by mouth daily        prochlorperazine 10 MG tablet    COMPAZINE    30 tablet    Take 1 tablet (10 mg) by mouth every 6 hours as needed (Nausea/Vomiting)    Encounter for antineoplastic chemotherapy       * simvastatin 20 MG tablet    ZOCOR    30 tablet    TAKE ONE TABLET BY MOUTH AT BEDTIME    Hyperlipidemia with target LDL less than 130       * simvastatin 20 MG tablet    ZOCOR    30 tablet    TAKE ONE TABLET BY MOUTH AT BEDTIME--NEEDS CHOLESTEROL CHECK PRIOR TO FURTHER REFILLS    Hyperlipidemia with target LDL less than 130       TYLENOL EX ST ARTHRITIS PAIN 500 MG tablet   Generic drug:  acetaminophen      Take 500-1,000 mg by mouth every 6 hours as needed for mild pain    Dermatitis due to sun       venlafaxine 37.5 MG 24 hr capsule    EFFEXOR XR    90 capsule    Take 1 capsule (37.5 mg) by mouth daily    Adjustment disorder with depressed mood       * Notice:  This list has 4 medication(s) that are the same as other medications prescribed for you. Read the directions carefully, and ask your  doctor or other care provider to review them with you.

## 2017-08-23 NOTE — PATIENT INSTRUCTIONS
Please follow up with Dr. Ferguson in 9 weeks.  Continue with treatment plan.      Follow Up Date/Time: 10/24/17 at 10:30 with infusion to follow at 11:00    If you have any questions or concerns please feel free to call.    Juan José Hernandez, RN, BSN   Oncology Care Coordinator RN  [facility]  [phone]

## 2017-08-23 NOTE — MR AVS SNAPSHOT
After Visit Summary   8/23/2017    Michaela Dorman    MRN: [id number]           Patient Information     Date Of Birth          1950        Visit Information        Provider Department      8/23/2017 11:00 AM Oskar Santoro MD [facility]        Today's Diagnoses     Malignant neoplasm of upper-outer quadrant of left breast in female, estrogen receptor negative (H)    -  1      Care Instructions      Please follow up with Dr. Ferguson in 9 weeks.  Continue with treatment plan.      Follow Up Date/Time: 10/24/17 at 10:30 with infusion to follow at 11:00    If you have any questions or concerns please feel free to call.    Juan José Hernandez, RN, BSN   Oncology Care Coordinator RN  [facility]  [phone]              Follow-ups after your visit        Follow-up notes from your care team     Return in about 9 weeks (around 10/25/2017).      Your next 10 appointments already scheduled     Aug 24, 2017 10:45 AM CDT   Ortho Treatment with Radha Preston PT   [facility] ([facility])    [address] [place] [address] [place] [address]   [phone]            Sep 12, 2017  1:00 PM CDT   Level 2 with NL INFUSION CHAIR 3   [facility] ([facility])    [address] [place] [address] [place] [address]   [phone]            Oct 03, 2017 11:00 AM CDT   Level 2 with NL INFUSION CHAIR 3   [facility] ([facility])    [address] [place] [address] [place] [address]   [phone]            Oct 24, 2017 10:30 AM CDT   Return Visit with Syeda Ferguson MD   [facility] ([facility])    [address]  [place] [address]   [phone]            Oct 24, 2017 11:00 AM CDT   Level 2 with NL INFUSION CHAIR 3   [facility] ([facility])    [address] [place] [address] Dr [place] [address]   [phone]             Nov 14, 2017 11:00 AM CST   Level 2 with NL INFUSION CHAIR 2   [facility] ([facility])    [address] [place] [address] [phone]            Dec 19, 2017 12:30 PM CST   Return Visit with Loren Felipe MD   [facility] ([facility])    [address] [place] [id number] [phone]              Future tests that were ordered for you today     Open Future Orders        Priority Expected Expires Ordered    CBC with platelets Routine 10/25/2017 8/23/2018 8/23/2017    Comprehensive metabolic panel Routine 10/25/2017 8/23/2018 8/23/2017            Who to contact     If you have questions or need follow up information about today's clinic visit or your schedule please contact [facility] directly at [phone].  Normal or non-critical lab and imaging results will be communicated to you by MyChart, letter or phone within 4 business days after the clinic has received the results. If you do not hear from us within 7 days, please contact the clinic through [employer] or phone. If you have a critical or abnormal lab result, we will notify you by phone as soon as possible.  Submit refill requests through [employer] or call your pharmacy and they will forward the refill request to us. Please allow 3 business days for your refill to be completed.          Additional Information About Your Visit        [employer] Information     [employer] gives you secure access to your electronic health record. If you see a primary care provider, you can also send messages to your care team and make appointments. If you have questions, please call your primary care clinic.  If you do not have a primary care provider, please call [phone] and they will assist you.        Care EveryWhere ID     This is your Care EveryWhere ID. This could be used by other organizations to access your [place] medical records  [id number]        Your  "Vitals Were     Pulse Temperature Height Pulse Oximetry BMI (Body Mass Index)       89 97.9  F (36.6  C) 1.676 m (5' 6\") 96% 23.03 kg/m2        Blood Pressure from Last 3 Encounters:   08/23/17 118/64   08/01/17 148/76   07/11/17 130/74    Weight from Last 3 Encounters:   08/23/17 64.7 kg (142 lb 11.2 oz)   08/23/17 64.7 kg (142 lb 11.2 oz)   07/11/17 64.6 kg (142 lb 8 oz)               Primary Care Provider Office Phone # Fax #    Phani Tapia PA-C [phone]       [address] [facility] [address]        Equal Access to Services     SHIVANI ARTIS : Amy le Sojose, waaxda luqadaha, qaybta kaalmada adeegyada, wale allen . So [facility] [phone].    ATENCIÓN: Si habla español, tiene a ordoñez disposición servicios gratuitos de asistencia lingüística. Llame al [phone].    We comply with applicable federal civil rights laws and [place] laws. We do not discriminate on the basis of race, color, national origin, age, disability sex, sexual orientation or gender identity.            Thank you!     Thank you for choosing [facility]  for your care. Our goal is always to provide you with excellent care. Hearing back from our patients is one way we can continue to improve our services. Please take a few minutes to complete the written survey that you may receive in the mail after your visit with us. Thank you!             Your Updated Medication List - Protect others around you: Learn how to safely use, store and throw away your medicines at www.disposemymeds.org.          This list is accurate as of: 8/23/17 11:57 AM.  Always use your most recent med list.                   Brand Name Dispense Instructions for use Diagnosis    ciprofloxacin 500 MG tablet    CIPRO    14 tablet    Take 1 tablet (500 mg) by mouth 2 times daily    Dysuria, Nonspecific finding on examination of urine, Acute cystitis without hematuria       * FLUoxetine 20 MG capsule    " PROzac    270 capsule    TAKE THREE CAPSULES BY MOUTH EVERY DAY TITRATE AS DIRECTED    Adjustment disorder with depressed mood       * FLUoxetine 20 MG capsule    PROzac    270 capsule    TAKE THREE CAPSULES BY MOUTH EVERY DAY TITRATE AS DIRECTED    Adjustment disorder with depressed mood       hydrochlorothiazide 25 MG tablet    HYDRODIURIL    90 tablet    Take 1 tablet (25 mg) by mouth daily    Essential hypertension       LORazepam 0.5 MG tablet    ATIVAN    15 tablet    Take 1 tablet (0.5 mg) by mouth every 4 hours as needed (Anxiety, Nausea/Vomiting or Sleep)    Malignant neoplasm of upper-outer quadrant of left female breast (H), Encounter for antineoplastic chemotherapy       oxybutynin 5 MG tablet    DITROPAN    90 tablet    TAKE ONE-HALF TABLET BY MOUTH TWICE A DAY    Dysuria       oxyCODONE-acetaminophen 5-325 MG per tablet    PERCOCET    20 tablet    Take 1 tablet by mouth every 4 hours as needed for pain    Post-op pain, S/p reverse total shoulder arthroplasty, right, S/P bilateral mastectomy, Trochanteric bursitis, unspecified laterality       priLOSEC 20 MG CR capsule   Generic drug:  omeprazole      Take by mouth daily        prochlorperazine 10 MG tablet    COMPAZINE    30 tablet    Take 1 tablet (10 mg) by mouth every 6 hours as needed (Nausea/Vomiting)    Encounter for antineoplastic chemotherapy       * simvastatin 20 MG tablet    ZOCOR    30 tablet    TAKE ONE TABLET BY MOUTH AT BEDTIME    Hyperlipidemia with target LDL less than 130       * simvastatin 20 MG tablet    ZOCOR    30 tablet    TAKE ONE TABLET BY MOUTH AT BEDTIME--NEEDS CHOLESTEROL CHECK PRIOR TO FURTHER REFILLS    Hyperlipidemia with target LDL less than 130       TYLENOL EX ST ARTHRITIS PAIN 500 MG tablet   Generic drug:  acetaminophen      Take 500-1,000 mg by mouth every 6 hours as needed for mild pain    Dermatitis due to sun       venlafaxine 37.5 MG 24 hr capsule    EFFEXOR XR    90 capsule    Take 1 capsule (37.5 mg) by mouth  daily    Adjustment disorder with depressed mood       * Notice:  This list has 4 medication(s) that are the same as other medications prescribed for you. Read the directions carefully, and ask your doctor or other care provider to review them with you.

## 2017-08-24 ENCOUNTER — HOSPITAL ENCOUNTER (OUTPATIENT)
Dept: PHYSICAL THERAPY | Facility: CLINIC | Age: 67
Setting detail: THERAPIES SERIES
End: 2017-08-24
Attending: PHYSICIAN ASSISTANT
Payer: MEDICARE

## 2017-08-24 PROCEDURE — 97140 MANUAL THERAPY 1/> REGIONS: CPT | Mod: GP | Performed by: PHYSICAL THERAPIST

## 2017-08-24 PROCEDURE — 40000718 ZZHC STATISTIC PT DEPARTMENT ORTHO VISIT: Performed by: PHYSICAL THERAPIST

## 2017-09-05 ENCOUNTER — MYC MEDICAL ADVICE (OUTPATIENT)
Dept: FAMILY MEDICINE | Facility: OTHER | Age: 67
End: 2017-09-05

## 2017-09-11 ENCOUNTER — TELEPHONE (OUTPATIENT)
Dept: FAMILY MEDICINE | Facility: OTHER | Age: 67
End: 2017-09-11

## 2017-09-11 NOTE — TELEPHONE ENCOUNTER
Reason for Call:  Form, our goal is to have forms completed with 72 hours, however, some forms may require a visit or additional information.    Type of letter, form or note:  medical    Who is the form from?: [facility] (if other please explain)    Where did the form come from: form was faxed in    What clinic location was the form placed at?: [facility] - [phone]    Where the form was placed: 's Box    What number is listed as a contact on the form?: [phone]       Additional comments: any     Call taken on 9/11/2017 at 2:42 PM by Debbie Hilliard

## 2017-09-12 ENCOUNTER — INFUSION THERAPY VISIT (OUTPATIENT)
Dept: INFUSION THERAPY | Facility: CLINIC | Age: 67
End: 2017-09-12
Payer: MEDICARE

## 2017-09-12 VITALS
SYSTOLIC BLOOD PRESSURE: 140 MMHG | DIASTOLIC BLOOD PRESSURE: 78 MMHG | BODY MASS INDEX: 23.08 KG/M2 | OXYGEN SATURATION: 96 % | RESPIRATION RATE: 16 BRPM | TEMPERATURE: 97.8 F | WEIGHT: 143 LBS | HEART RATE: 70 BPM

## 2017-09-12 DIAGNOSIS — C50.412 MALIGNANT NEOPLASM OF UPPER-OUTER QUADRANT OF LEFT FEMALE BREAST, UNSPECIFIED ESTROGEN RECEPTOR STATUS (H): ICD-10-CM

## 2017-09-12 DIAGNOSIS — Z51.11 ENCOUNTER FOR ANTINEOPLASTIC CHEMOTHERAPY: Primary | ICD-10-CM

## 2017-09-12 PROCEDURE — 25000128 H RX IP 250 OP 636: Performed by: INTERNAL MEDICINE

## 2017-09-12 PROCEDURE — 96413 CHEMO IV INFUSION 1 HR: CPT

## 2017-09-12 RX ORDER — HEPARIN SODIUM (PORCINE) LOCK FLUSH IV SOLN 100 UNIT/ML 100 UNIT/ML
500 SOLUTION INTRAVENOUS EVERY 8 HOURS
Status: CANCELLED
Start: 2017-09-12

## 2017-09-12 RX ORDER — HEPARIN SODIUM (PORCINE) LOCK FLUSH IV SOLN 100 UNIT/ML 100 UNIT/ML
500 SOLUTION INTRAVENOUS EVERY 8 HOURS
Status: DISCONTINUED | OUTPATIENT
Start: 2017-09-12 | End: 2017-09-12 | Stop reason: HOSPADM

## 2017-09-12 RX ADMIN — SODIUM CHLORIDE, PRESERVATIVE FREE 500 UNITS: 5 INJECTION INTRAVENOUS at 14:34

## 2017-09-12 RX ADMIN — TRASTUZUMAB 380 MG: 150 INJECTION, POWDER, LYOPHILIZED, FOR SOLUTION INTRAVENOUS at 13:51

## 2017-09-12 RX ADMIN — SODIUM CHLORIDE 250 ML: 9 INJECTION, SOLUTION INTRAVENOUS at 13:34

## 2017-09-12 ASSESSMENT — PAIN SCALES - GENERAL: PAINLEVEL: NO PAIN (0)

## 2017-09-12 NOTE — MR AVS SNAPSHOT
After Visit Summary   9/12/2017    Michaela Dorman    MRN: [id number]           Patient Information     Date Of Birth          1950        Visit Information        Provider Department      9/12/2017 1:00 PM NL INFUSION CHAIR 3 [facility]        Today's Diagnoses     Encounter for antineoplastic chemotherapy    -  1    Malignant neoplasm of upper-outer quadrant of left female breast, unspecified estrogen receptor status (H)          Care Instructions    Pt to return on 10/3 for Hercpetin. Copies of medication list and upcoming appointments given prior to discharge.           Follow-ups after your visit        Follow-up notes from your care team     Return in 3 weeks (on 10/3/2017).      Your next 10 appointments already scheduled     Sep 21, 2017  8:00 AM CDT   Office Visit with Phani Tapia PA-C   [facility] ([facility])    [id number] [place] [address]   [phone]           Bring a current list of meds and any records pertaining to this visit. For Physicals, please bring immunization records and any forms needing to be filled out. Please arrive 10 minutes early to complete paperwork.            Oct 03, 2017 11:00 AM CDT   Level 2 with NL INFUSION CHAIR 3   [facility] ([facility])    [address] [place] [address]   [phone]            Oct 24, 2017 10:30 AM CDT   Return Visit with Syeda Ferguson MD   [facility]    [address] [address]   [phone]            Oct 24, 2017 11:00 AM CDT   Level 2 with NL INFUSION CHAIR 3   [facility] ([facility])    [address] [place] [address]   [phone]            Nov 14, 2017 11:00 AM CST   Level 2 with NL INFUSION CHAIR 2   [facility] ([facility])    [facility]  [address] [place] [address] [phone]            Dec 19, 2017 12:30 PM CST   Return Visit with Loren Felipe MD   [facility] ([facility])    [address] [place] [address] [phone]              Who to contact     If you have questions or need follow up information about today's clinic visit or your schedule please contact [facility] directly at [phone].  Normal or non-critical lab and imaging results will be communicated to you by [employer], letter or phone within 4 business days after the clinic has received the results. If you do not hear from us within 7 days, please contact the clinic through [employer] or phone. If you have a critical or abnormal lab result, we will notify you by phone as soon as possible.  Submit refill requests through [employer] or call your pharmacy and they will forward the refill request to us. Please allow 3 business days for your refill to be completed.          Additional Information About Your Visit        [employer] Information     [employer] gives you secure access to your electronic health record. If you see a primary care provider, you can also send messages to your care team and make appointments. If you have questions, please call your primary care clinic.  If you do not have a primary care provider, please call [phone] and they will assist you.        Care EveryWhere ID     This is your Care EveryWhere ID. This could be used by other organizations to access your [place] medical records  [id number]        Your Vitals Were     Pulse Temperature Respirations Pulse Oximetry BMI (Body Mass Index)       70 97.8  F (36.6  C) (Temporal) 16 96% 23.08 kg/m2        Blood Pressure from Last 3 Encounters:   09/12/17 140/78   08/23/17 121/52   08/23/17 118/64    Weight from Last 3 Encounters:   09/12/17 64.9 kg (143 lb)   08/23/17 64.7 kg (142 lb 11.2 oz)   08/23/17 64.7 kg (142 lb 11.2 oz)               Today, you had the following     No orders found for display       Primary Care Provider Office Phone # Fax #    Phani Tapia PA-C [phone]       [address] [facility] [address]        Equal Access to Services     SHIVANI ARTIS : Hadii santa ku nahido Soomaali, waaxda luqadaha, qaybta kaalmada adeegyada, wale aguileran darianmae hawk alejandro pena. So [facility] [phone].    ATENCIÓN: Si habla español, tiene a ordoñez disposición servicios gratuitos de asistencia lingüística. Llame al [phone].    We comply with applicable federal civil rights laws and [place] laws. We do not discriminate on the basis of race, color, national origin, age, disability sex, sexual orientation or gender identity.            Thank you!     Thank you for choosing [facility]  for your care. Our goal is always to provide you with excellent care. Hearing back from our patients is one way we can continue to improve our services. Please take a few minutes to complete the written survey that you may receive in the mail after your visit with us. Thank you!             Your Updated Medication List - Protect others around you: Learn how to safely use, store and throw away your medicines at www.disposemymeds.org.          This list is accurate as of: 9/12/17  4:00 PM.  Always use your most recent med list.                   Brand Name Dispense Instructions for use Diagnosis    ciprofloxacin 500 MG tablet    CIPRO    14 tablet    Take 1 tablet (500 mg) by mouth 2 times daily    Dysuria, Nonspecific finding on examination of urine, Acute cystitis without hematuria       * FLUoxetine 20 MG capsule    PROzac    270 capsule    TAKE THREE CAPSULES BY MOUTH EVERY DAY TITRATE AS DIRECTED    Adjustment disorder with depressed mood       * FLUoxetine 20 MG capsule    PROzac    270 capsule    TAKE THREE CAPSULES BY MOUTH EVERY DAY TITRATE AS DIRECTED    Adjustment disorder with depressed mood        hydrochlorothiazide 25 MG tablet    HYDRODIURIL    90 tablet    Take 1 tablet (25 mg) by mouth daily    Essential hypertension       LORazepam 0.5 MG tablet    ATIVAN    15 tablet    Take 1 tablet (0.5 mg) by mouth every 4 hours as needed (Anxiety, Nausea/Vomiting or Sleep)    Malignant neoplasm of upper-outer quadrant of left female breast (H), Encounter for antineoplastic chemotherapy       oxybutynin 5 MG tablet    DITROPAN    90 tablet    TAKE ONE-HALF TABLET BY MOUTH TWICE A DAY    Dysuria       oxyCODONE-acetaminophen 5-325 MG per tablet    PERCOCET    20 tablet    Take 1 tablet by mouth every 4 hours as needed for pain    Post-op pain, S/p reverse total shoulder arthroplasty, right, S/P bilateral mastectomy, Trochanteric bursitis, unspecified laterality       priLOSEC 20 MG CR capsule   Generic drug:  omeprazole      Take by mouth daily        prochlorperazine 10 MG tablet    COMPAZINE    30 tablet    Take 1 tablet (10 mg) by mouth every 6 hours as needed (Nausea/Vomiting)    Encounter for antineoplastic chemotherapy       * simvastatin 20 MG tablet    ZOCOR    30 tablet    TAKE ONE TABLET BY MOUTH AT BEDTIME    Hyperlipidemia with target LDL less than 130       * simvastatin 20 MG tablet    ZOCOR    30 tablet    TAKE ONE TABLET BY MOUTH AT BEDTIME--NEEDS CHOLESTEROL CHECK PRIOR TO FURTHER REFILLS    Hyperlipidemia with target LDL less than 130       TYLENOL EX ST ARTHRITIS PAIN 500 MG tablet   Generic drug:  acetaminophen      Take 500-1,000 mg by mouth every 6 hours as needed for mild pain    Dermatitis due to sun       venlafaxine 37.5 MG 24 hr capsule    EFFEXOR XR    90 capsule    Take 1 capsule (37.5 mg) by mouth daily    Adjustment disorder with depressed mood       * Notice:  This list has 4 medication(s) that are the same as other medications prescribed for you. Read the directions carefully, and ask your doctor or other care provider to review them with you.

## 2017-09-12 NOTE — PROGRESS NOTES
Here for Hercpetin, tolerated well.  BSA/Dosing verified by RN x2. Positive blood return noted pre and post chemotherapy administration. Pt discharged in stable condition.

## 2017-09-12 NOTE — PATIENT INSTRUCTIONS
Pt to return on 10/3 for Hercpetin. Copies of medication list and upcoming appointments given prior to discharge.

## 2017-09-12 NOTE — TELEPHONE ENCOUNTER
Spoke to patient. Patient stated she is not requesting these medications these were related to something she filled out on Facebook. Patient was scheduled for a follow up per last refill.  [place] PIEDRA)

## 2017-09-15 NOTE — PROGRESS NOTES
SUBJECTIVE:                                                    Michaela Dorman is a 66 year old female who presents to clinic today for the following health issues:      History of Present Illness   Hyperlipidemia:     Low fat/chol diet rating::  Fair    Taking Statins::  YES    Side effects from hypolipidemia medication::  Possible muscle aches from Statin    Lipid Medications or Supplements::  None  Hypertension:     Outpatient blood pressures:  Are not being checked (130/70)    Dietary sodium intake::  No added salt diet  Diet::  Regular (no restrictions)  Frequency of exercise::  None  Taking medications regularly::  Yes  Medication side effects::  None  Additional concerns today::  No    Problem list and histories reviewed & adjusted, as indicated.  Additional history: as documented    Patient Active Problem List   Diagnosis     Enthesopathy of hip region     Family history of stroke (cerebrovascular)     Trochanteric bursitis     Advanced directives, counseling/discussion     Health Care Home     Baker's cyst of knee     Patella-femoral syndrome     Adjustment disorder with depressed mood     Essential hypertension     Malignant neoplasm of upper-outer quadrant of left female breast (H)     Encounter for antineoplastic chemotherapy     S/P bilateral mastectomy     Anxiety     Hyperlipidemia LDL goal <130     S/p reverse total shoulder arthroplasty, right     Prophylactic antibiotic for dental procedure indicated due to prior joint replacement     Past Surgical History:   Procedure Laterality Date     ARTHROPLASTY SHOULDER Right 11/17/2015     ARTHROSCOPY KNEE  6/7/2012    Procedure:ARTHROSCOPY KNEE; right knee arthroscopy with partial lateral menisectomy; Surgeon:DAMIÁN MCALLISTER; Location: OR     C LIGATE FALLOPIAN TUBE       C NONSPECIFIC PROCEDURE  1956    ankle fracture surgery     EXCISE MASS AXILLA Left 9/16/2016    Procedure: EXCISE MASS AXILLA;  Surgeon: Keyon Blanco MD;  Location:  OR      HC REMV CATARACT EXTRACAP,INSERT LENS  6/25/2009    Right eye     INSERT PORT VASCULAR ACCESS Right 10/7/2016    Procedure: INSERT PORT VASCULAR ACCESS;  Surgeon: Keyon Blanco MD;  Location: [facility]     MASTECTOMY SIMPLE BILATERAL Bilateral 2/8/2017    Procedure: MASTECTOMY SIMPLE BILATERAL;  Surgeon: Keyon Blanco MD;  Location: [facility] OR     OPEN REDUCTION INTERNAL FIXATION FOOT Right 3/20/2015    Procedure: OPEN REDUCTION INTERNAL FIXATION FOOT;  Surgeon: Javi Herrmann DPM;  Location: [facility] OR     SHOULDER SURGERY Right 11/2015       Social History   Substance Use Topics     Smoking status: Former Smoker     Packs/day: 3.00     Years: 10.00     Types: Cigarettes     Quit date: 12/1/1979     Smokeless tobacco: Never Used      Comment: approx. 3 packs daily     Alcohol use 0.0 oz/week     0 Standard drinks or equivalent per week      Comment: daily glass of wine     Family History   Problem Relation Age of Onset     Hypertension Mother      Thyroid Disease Mother      CEREBROVASCULAR DISEASE Mother      Hypertension Father      CEREBROVASCULAR DISEASE Father      CANCER Father      skin     Thyroid Disease Sister      DIABETES Brother      type 2     Depression Sister      Breast Cancer Sister      age 47     CANCER Sister      skin     CANCER Brother      inside nose             ROS:  Constitutional, HEENT, cardiovascular, pulmonary, gi and gu systems are negative, except as otherwise noted.      OBJECTIVE:   /64 (Cuff Size: Adult Regular)  Pulse 80  Temp 98.3  F (36.8  C) (Temporal)  Resp 16  Wt 144 lb 8 oz (65.5 kg)  BMI 23.32 kg/m2  Body mass index is 23.32 kg/(m^2).  GENERAL: healthy, alert and no distress  NECK: no adenopathy, no asymmetry, masses, or scars and trachea midline and normal to palpation  RESP: lungs clear to auscultation - no rales, rhonchi or wheezes  CV: regular rate and rhythm, normal S1 S2, no S3 or S4, no murmur, click or rub, no peripheral edema and peripheral pulses  strong  MS: no gross musculoskeletal defects noted, no edema  NEURO: Normal strength and tone, mentation intact and speech normal  PSYCH: mentation appears normal, affect normal/bright    Diagnostic Test Results:  No results found for this or any previous visit (from the past 24 hour(s)).    ASSESSMENT/PLAN:     1. Need for hepatitis C screening test  She was advised to have these labs on her future.  - Hepatitis C antibody; Future    2. Adjustment disorder with depressed mood  She is tolerating Effexor in the past and we will increase her dose to 3 tablets daily in the near future and see if we can help with the overall mood that she is struggling with. I would not consider this uncommon for her  - venlafaxine (EFFEXOR XR) 37.5 MG 24 hr capsule; Take 1 capsule (37.5 mg) by mouth daily For one week then 2 daily for a week then 3 daily for 3-4 weeks.  Dispense: 90 capsule; Refill: 1    3. Hyperlipidemia LDL goal <130  For labs in the near future    4. Essential hypertension  Good control at this point time.    5. S/P bilateral mastectomy  Pain related to the overall healing process is well controlled and she needs medications on a very rare instance to help her get chores done at home.  - oxyCODONE-acetaminophen (PERCOCET) 5-325 MG per tablet; Take 1 tablet by mouth every 4 hours as needed for pain  Dispense: 20 tablet; Refill: 0    6. Special screening for malignant neoplasms, colon  due for rescreening.  - Colonoscopy [84346]; Future  - GASTROENTEROLOGY ADULT REF PROCEDURE ONLY    7. Post-op pain  Review #5  - oxyCODONE-acetaminophen (PERCOCET) 5-325 MG per tablet; Take 1 tablet by mouth every 4 hours as needed for pain  Dispense: 20 tablet; Refill: 0    8. S/p reverse total shoulder arthroplasty, right  Review #5 as well as improved pain from her shoulder.  - oxyCODONE-acetaminophen (PERCOCET) 5-325 MG per tablet; Take 1 tablet by mouth every 4 hours as needed for pain  Dispense: 20 tablet; Refill: 0    9.  Trochanteric bursitis, unspecified laterality  Review #8  - oxyCODONE-acetaminophen (PERCOCET) 5-325 MG per tablet; Take 1 tablet by mouth every 4 hours as needed for pain  Dispense: 20 tablet; Refill: 0    Phani Jason PA-C  [facility]  Answers for HPI/ROS submitted by the patient on 9/25/2017   If you checked off any problems, how difficult have these problems made it for you to do your work, take care of things at home, or get along with other people?: Somewhat difficult  PHQ9 TOTAL SCORE: 12  AUSTIN 7 TOTAL SCORE: 3  PHQ-2 Score: Incomplete

## 2017-09-18 ENCOUNTER — TELEPHONE (OUTPATIENT)
Dept: LAB | Facility: OTHER | Age: 67
End: 2017-09-18

## 2017-09-18 DIAGNOSIS — Z11.59 NEED FOR HEPATITIS C SCREENING TEST: ICD-10-CM

## 2017-09-18 DIAGNOSIS — F41.9 ANXIETY: ICD-10-CM

## 2017-09-18 DIAGNOSIS — E78.5 HYPERLIPIDEMIA LDL GOAL <130: ICD-10-CM

## 2017-09-18 DIAGNOSIS — Z51.11 ENCOUNTER FOR ANTINEOPLASTIC CHEMOTHERAPY: ICD-10-CM

## 2017-09-18 DIAGNOSIS — E78.5 HYPERLIPIDEMIA LDL GOAL <130: Primary | ICD-10-CM

## 2017-09-18 LAB
ALBUMIN SERPL-MCNC: 4.1 G/DL (ref 3.4–5)
ALP SERPL-CCNC: 56 U/L (ref 40–150)
ALT SERPL W P-5'-P-CCNC: 25 U/L (ref 0–50)
ANION GAP SERPL CALCULATED.3IONS-SCNC: 12 MMOL/L (ref 3–14)
AST SERPL W P-5'-P-CCNC: 21 U/L (ref 0–45)
BILIRUB SERPL-MCNC: 0.4 MG/DL (ref 0.2–1.3)
BUN SERPL-MCNC: 32 MG/DL (ref 7–30)
CALCIUM SERPL-MCNC: 10 MG/DL (ref 8.5–10.1)
CHLORIDE SERPL-SCNC: 102 MMOL/L (ref 94–109)
CHOLEST SERPL-MCNC: 273 MG/DL
CO2 SERPL-SCNC: 28 MMOL/L (ref 20–32)
CREAT SERPL-MCNC: 0.98 MG/DL (ref 0.52–1.04)
ERYTHROCYTE [DISTWIDTH] IN BLOOD BY AUTOMATED COUNT: 13.4 % (ref 10–15)
GFR SERPL CREATININE-BSD FRML MDRD: 57 ML/MIN/1.7M2
GLUCOSE SERPL-MCNC: 92 MG/DL (ref 70–99)
HCT VFR BLD AUTO: 38.6 % (ref 35–47)
HCV AB SERPL QL IA: NONREACTIVE
HDLC SERPL-MCNC: 67 MG/DL
HGB BLD-MCNC: 12.5 G/DL (ref 11.7–15.7)
LDLC SERPL CALC-MCNC: 170 MG/DL
MCH RBC QN AUTO: 32 PG (ref 26.5–33)
MCHC RBC AUTO-ENTMCNC: 32.4 G/DL (ref 31.5–36.5)
MCV RBC AUTO: 99 FL (ref 78–100)
NONHDLC SERPL-MCNC: 206 MG/DL
PLATELET # BLD AUTO: 266 10E9/L (ref 150–450)
POTASSIUM SERPL-SCNC: 4.2 MMOL/L (ref 3.4–5.3)
PROT SERPL-MCNC: 7.8 G/DL (ref 6.8–8.8)
RBC # BLD AUTO: 3.91 10E12/L (ref 3.8–5.2)
SODIUM SERPL-SCNC: 142 MMOL/L (ref 133–144)
TRIGL SERPL-MCNC: 178 MG/DL
WBC # BLD AUTO: 5.6 10E9/L (ref 4–11)

## 2017-09-18 PROCEDURE — 85027 COMPLETE CBC AUTOMATED: CPT | Performed by: PHYSICIAN ASSISTANT

## 2017-09-18 PROCEDURE — 36415 COLL VENOUS BLD VENIPUNCTURE: CPT | Performed by: PHYSICIAN ASSISTANT

## 2017-09-18 PROCEDURE — 86803 HEPATITIS C AB TEST: CPT | Performed by: PHYSICIAN ASSISTANT

## 2017-09-18 PROCEDURE — 80061 LIPID PANEL: CPT | Performed by: PHYSICIAN ASSISTANT

## 2017-09-18 PROCEDURE — 80053 COMPREHEN METABOLIC PANEL: CPT | Performed by: PHYSICIAN ASSISTANT

## 2017-09-18 NOTE — TELEPHONE ENCOUNTER
Patient appears to be due for lipid and Hep C Screen. Please review and sign.  Rocio Velásquez CMA ([facility])

## 2017-09-18 NOTE — TELEPHONE ENCOUNTER
Pt came in to lab today for pre-visit blood work.  Please order labs as needed, pt was fasting.    Thanks!

## 2017-09-19 ENCOUNTER — MYC MEDICAL ADVICE (OUTPATIENT)
Dept: FAMILY MEDICINE | Facility: OTHER | Age: 67
End: 2017-09-19

## 2017-09-19 DIAGNOSIS — E78.5 HYPERLIPIDEMIA LDL GOAL <130: Primary | ICD-10-CM

## 2017-09-19 RX ORDER — ATORVASTATIN CALCIUM 20 MG/1
20 TABLET, FILM COATED ORAL DAILY
Qty: 90 TABLET | Refills: 1 | Status: SHIPPED | OUTPATIENT
Start: 2017-09-19 | End: 2018-03-24

## 2017-09-23 ENCOUNTER — MYC MEDICAL ADVICE (OUTPATIENT)
Dept: FAMILY MEDICINE | Facility: OTHER | Age: 67
End: 2017-09-23

## 2017-09-25 ENCOUNTER — OFFICE VISIT (OUTPATIENT)
Dept: FAMILY MEDICINE | Facility: OTHER | Age: 67
End: 2017-09-25
Payer: COMMERCIAL

## 2017-09-25 VITALS
RESPIRATION RATE: 16 BRPM | BODY MASS INDEX: 23.32 KG/M2 | DIASTOLIC BLOOD PRESSURE: 64 MMHG | SYSTOLIC BLOOD PRESSURE: 120 MMHG | TEMPERATURE: 98.3 F | WEIGHT: 144.5 LBS | HEART RATE: 80 BPM

## 2017-09-25 DIAGNOSIS — I10 ESSENTIAL HYPERTENSION: ICD-10-CM

## 2017-09-25 DIAGNOSIS — M70.60 TROCHANTERIC BURSITIS, UNSPECIFIED LATERALITY: ICD-10-CM

## 2017-09-25 DIAGNOSIS — E78.5 HYPERLIPIDEMIA LDL GOAL <130: ICD-10-CM

## 2017-09-25 DIAGNOSIS — G89.18 POST-OP PAIN: ICD-10-CM

## 2017-09-25 DIAGNOSIS — Z11.59 NEED FOR HEPATITIS C SCREENING TEST: ICD-10-CM

## 2017-09-25 DIAGNOSIS — Z96.611 S/P REVERSE TOTAL SHOULDER ARTHROPLASTY, RIGHT: ICD-10-CM

## 2017-09-25 DIAGNOSIS — Z12.11 SPECIAL SCREENING FOR MALIGNANT NEOPLASMS, COLON: ICD-10-CM

## 2017-09-25 DIAGNOSIS — F43.21 ADJUSTMENT DISORDER WITH DEPRESSED MOOD: Primary | ICD-10-CM

## 2017-09-25 DIAGNOSIS — Z90.13 S/P BILATERAL MASTECTOMY: ICD-10-CM

## 2017-09-25 PROCEDURE — 99214 OFFICE O/P EST MOD 30 MIN: CPT | Performed by: PHYSICIAN ASSISTANT

## 2017-09-25 RX ORDER — OXYCODONE AND ACETAMINOPHEN 5; 325 MG/1; MG/1
1 TABLET ORAL EVERY 4 HOURS PRN
Qty: 20 TABLET | Refills: 0 | Status: SHIPPED | OUTPATIENT
Start: 2017-09-25 | End: 2017-12-08

## 2017-09-25 RX ORDER — VENLAFAXINE HYDROCHLORIDE 37.5 MG/1
37.5 CAPSULE, EXTENDED RELEASE ORAL DAILY
Qty: 90 CAPSULE | Refills: 1 | Status: SHIPPED | OUTPATIENT
Start: 2017-09-25 | End: 2017-12-27

## 2017-09-25 ASSESSMENT — ANXIETY QUESTIONNAIRES
GAD7 TOTAL SCORE: 3
4. TROUBLE RELAXING: NOT AT ALL
7. FEELING AFRAID AS IF SOMETHING AWFUL MIGHT HAPPEN: SEVERAL DAYS
6. BECOMING EASILY ANNOYED OR IRRITABLE: NOT AT ALL
2. NOT BEING ABLE TO STOP OR CONTROL WORRYING: SEVERAL DAYS
1. FEELING NERVOUS, ANXIOUS, OR ON EDGE: NOT AT ALL
5. BEING SO RESTLESS THAT IT IS HARD TO SIT STILL: NOT AT ALL
3. WORRYING TOO MUCH ABOUT DIFFERENT THINGS: SEVERAL DAYS
GAD7 TOTAL SCORE: 3
7. FEELING AFRAID AS IF SOMETHING AWFUL MIGHT HAPPEN: SEVERAL DAYS
GAD7 TOTAL SCORE: 3

## 2017-09-25 ASSESSMENT — PATIENT HEALTH QUESTIONNAIRE - PHQ9
SUM OF ALL RESPONSES TO PHQ QUESTIONS 1-9: 12
10. IF YOU CHECKED OFF ANY PROBLEMS, HOW DIFFICULT HAVE THESE PROBLEMS MADE IT FOR YOU TO DO YOUR WORK, TAKE CARE OF THINGS AT HOME, OR GET ALONG WITH OTHER PEOPLE: SOMEWHAT DIFFICULT
SUM OF ALL RESPONSES TO PHQ QUESTIONS 1-9: 12

## 2017-09-25 ASSESSMENT — PAIN SCALES - GENERAL: PAINLEVEL: NO PAIN (0)

## 2017-09-25 NOTE — LETTER
My Depression Action Plan  Name: Michaela Dorman   Date of Birth 1950  Date: 9/15/2017    My doctor: Phani Tapia   My clinic: [address] [address] [phone]          GREEN    ZONE   Good Control    What it looks like:     Things are going generally well. You have normal up s and down s. You may even feel depressed from time to time, but bad moods usually last less than a day.   What you need to do:  1. Continue to care for yourself (see self care plan)  2. Check your depression survival kit and update it as needed  3. Follow your physician s recommendations including any medication.  4. Do not stop taking medication unless you consult with your physician first.           YELLOW         ZONE Getting Worse    What it looks like:     Depression is starting to interfere with your life.     It may be hard to get out of bed; you may be starting to isolate yourself from others.    Symptoms of depression are starting to last most all day and this has happened for several days.     You may have suicidal thoughts but they are not constant.   What you need to do:     1. Call your care team, your response to treatment will improve if you keep your care team informed of your progress. Yellow periods are signs an adjustment may need to be made.     2. Continue your self-care, even if you have to fake it!    3. Talk to someone in your support network    4. Open up your depression survival kit           RED    ZONE Medical Alert - Get Help    What it looks like:     Depression is seriously interfering with your life.     You may experience these or other symptoms: You can t get out of bed most days, can t work or engage in other necessary activities, you have trouble taking care of basic hygiene, or basic responsibilities, thoughts of suicide or death that will not go away, self-injurious behavior.     What you need to do:  1. Call your care team and  request a same-day appointment. If they are not available (weekends or after hours) call your local crisis line, emergency room or 911.      Electronically signed by: Paulina Alberto, September 15, 2017    Depression Self Care Plan / Survival Kit    Self-Care for Depression  Here s the deal. Your body and mind are really not as separate as most people think.  What you do and think affects how you feel and how you feel influences what you do and think. This means if you do things that people who feel good do, it will help you feel better.  Sometimes this is all it takes.  There is also a place for medication and therapy depending on how severe your depression is, so be sure to consult with your medical provider and/ or Behavioral Health Consultant if your symptoms are worsening or not improving.     In order to better manage my stress, I will:    Exercise  Get some form of exercise, every day. This will help reduce pain and release endorphins, the  feel good  chemicals in your brain. This is almost as good as taking antidepressants!  This is not the same as joining a gym and then never going! (they count on that by the way ) It can be as simple as just going for a walk or doing some gardening, anything that will get you moving.      Hygiene   Maintain good hygiene (Get out of bed in the morning, Make your bed, Brush your teeth, Take a shower, and Get dressed like you were going to work, even if you are unemployed).  If your clothes don't fit try to get ones that do.    Diet  I will strive to eat foods that are good for me, drink plenty of water, and avoid excessive sugar, caffeine, alcohol, and other mood-altering substances.  Some foods that are helpful in depression are: complex carbohydrates, B vitamins, flaxseed, fish or fish oil, fresh fruits and vegetables.    Psychotherapy  I agree to participate in Individual Therapy (if recommended).    Medication  If prescribed medications, I agree to take them.   Missing doses can result in serious side effects.  I understand that drinking alcohol, or other illicit drug use, may cause potential side effects.  I will not stop my medication abruptly without first discussing it with my provider.    Staying Connected With Others  I will stay in touch with my friends, family members, and my primary care provider/team.    Use your imagination  Be creative.  We all have a creative side; it doesn t matter if it s oil painting, sand castles, or mud pies! This will also kick up the endorphins.    Witness Beauty  (AKA stop and smell the roses) Take a look outside, even in mid-winter. Notice colors, textures. Watch the squirrels and birds.     Service to others  Be of service to others.  There is always someone else in need.  By helping others we can  get out of ourselves  and remember the really important things.  This also provides opportunities for practicing all the other parts of the program.    Humor  Laugh and be silly!  Adjust your TV habits for less news and crime-drama and more comedy.    Control your stress  Try breathing deep, massage therapy, biofeedback, and meditation. Find time to relax each day.     My support system    Clinic Contact:  Phone number:    Contact 1:  Phone number:    Contact 2:  Phone number:    Gnosticist/:  Phone number:    Therapist:  Phone number:    Local crisis center:    Phone number:    Other community support:  Phone number:

## 2017-09-25 NOTE — NURSING NOTE
"Chief Complaint   Patient presents with     RECHECK     Panel Management     See        Initial /64 (Cuff Size: Adult Regular)  Pulse 80  Temp 98.3  F (36.8  C) (Temporal)  Resp 16  Wt 144 lb 8 oz (65.5 kg)  BMI 23.32 kg/m2 Estimated body mass index is 23.32 kg/(m^2) as calculated from the following:    Height as of 8/23/17: 5' 6\" (1.676 m).    Weight as of this encounter: 144 lb 8 oz (65.5 kg).  Medication Reconciliation: complete   Rocio Velásquez CMA ([facility])    "

## 2017-09-25 NOTE — MR AVS SNAPSHOT
After Visit Summary   9/25/2017    Michaela Dorman    MRN: [id number]           Patient Information     Date Of Birth          1950        Visit Information        Provider Department      9/25/2017 10:00 AM Phani Tapia PA-C [facility]        Today's Diagnoses     Adjustment disorder with depressed mood    -  1    Need for hepatitis C screening test        Hyperlipidemia LDL goal <130        Essential hypertension        S/P bilateral mastectomy        Special screening for malignant neoplasms, colon        Post-op pain        S/p reverse total shoulder arthroplasty, right        Trochanteric bursitis, unspecified laterality           Follow-ups after your visit        Additional Services     GASTROENTEROLOGY ADULT REF PROCEDURE ONLY       Last Lab Result: Creatinine (mg/dL)       Date                     Value                 09/18/2017               0.98             ----------  Body mass index is 23.32 kg/(m^2).     Needed:  No  Language:  English    Patient will be contacted to schedule procedure.     Please be aware that coverage of these services is subject to the terms and limitations of your health insurance plan.  Call member services at your health plan with any benefit or coverage questions.  Any procedures must be performed at a [place] facility OR coordinated by your clinic's referral office.    Please bring the following with you to your appointment:    (1) Any X-Rays, CTs or MRIs which have been performed.  Contact the facility where they were done to arrange for  prior to your scheduled appointment.    (2) List of current medications   (3) This referral request   (4) Any documents/labs given to you for this referral                  Your next 10 appointments already scheduled     Oct 03, 2017 11:00 AM CDT   Level 2 with NL INFUSION CHAIR 3   [facility] ([facility])    [address]  [address]   [phone]            Oct 24, 2017 10:30 AM CDT   [facility] Visit with Syeda Ferguson MD   [facility]    [address] [place] [address]   [phone]            Oct 24, 2017 11:00 AM CDT   Level 2 with NL INFUSION CHAIR 3   [facility] ([facility])    [address] [place] [address]   [place] [address]   [phone]            Nov 14, 2017 11:00 AM CST   Level 2 with NL INFUSION CHAIR 2   [facility] ([facility])    [address] [place] [address] [place] [address]   [phone]            Dec 19, 2017 12:30 PM CST   Return Visit with Loren Felipe MD   [facility]    [address] [place] [address] [phone]              Future tests that were ordered for you today     Open Future Orders        Priority Expected Expires Ordered    Hepatitis C antibody Routine  9/25/2018 9/25/2017    Colonoscopy [[id number]] Routine  12/24/2017 9/25/2017            Who to contact     If you have questions or need follow up information about today's clinic visit or your schedule please contact [facility] directly at [phone].  Normal or non-critical lab and imaging results will be communicated to you by [employer], letter or phone within 4 business days after the clinic has received the results. If you do not hear from us within 7 days, please contact the clinic through [employer] or phone. If you have a critical or abnormal lab result, we will notify you by phone as soon as possible.  Submit refill requests through [employer] or call your pharmacy and they will forward the refill request to us. Please allow 3 business days for your refill to be completed.          Additional Information About Your Visit        P[employer]her Information     [employer] gives you secure access to your electronic health record. If you see a primary  care provider, you can also send messages to your care team and make appointments. If you have questions, please call your primary care clinic.  If you do not have a primary care provider, please call [phone] and they will assist you.        Care EveryWhere ID     This is your Care EveryWhere ID. This could be used by other organizations to access your [facility] medical records  [id number]        Your Vitals Were     Pulse Temperature Respirations BMI (Body Mass Index)          80 98.3  F (36.8  C) (Temporal) 16 23.32 kg/m2         Blood Pressure from Last 3 Encounters:   09/25/17 120/64   09/12/17 140/78   08/23/17 121/52    Weight from Last 3 Encounters:   09/25/17 144 lb 8 oz (65.5 kg)   09/12/17 143 lb (64.9 kg)   08/23/17 142 lb 11.2 oz (64.7 kg)              We Performed the Following     DEPRESSION ACTION PLAN (DAP)     GASTROENTEROLOGY ADULT REF PROCEDURE ONLY          Today's Medication Changes          These changes are accurate as of: 9/25/17 10:24 AM.  If you have any questions, ask your nurse or doctor.               These medicines have changed or have updated prescriptions.        Dose/Directions    venlafaxine 37.5 MG 24 hr capsule   Commonly known as:  EFFEXOR XR   This may have changed:  additional instructions   Used for:  Adjustment disorder with depressed mood   Changed by:  Phani Tapia PA-C        Dose:  37.5 mg   Take 1 capsule (37.5 mg) by mouth daily For one week then 2 daily for a week then 3 daily for 3-4 weeks.   Quantity:  90 capsule   Refills:  1         Stop taking these medicines if you haven't already. Please contact your care team if you have questions.     FLUoxetine 20 MG capsule   Commonly known as:  PROzac   Stopped by:  Phani Tapia PA-C                Where to get your medicines      These medications were sent to [facility] [place], [place] - [address]   [address] [facility] [address]     Phone:  [phone]     venlafaxine 37.5 MG 24 hr capsule          Some of these will need a paper prescription and others can be bought over the counter.  Ask your nurse if you have questions.     Bring a paper prescription for each of these medications     oxyCODONE-acetaminophen 5-325 MG per tablet                Primary Care Provider Office Phone # Fax #    Phani Tapia PA-C [phone]       [address] [facility] [address]        Equal Access to Services     SHIVANI ARTIS : Hadii aad ku hadasho Soomaali, waaxda luqadaha, qaybta kaalmada adeegyada, waxay idiin hayaan adeeg khadairsh la'warren pena. So [facility] [phone].    ATENCIÓN: Si habla espdavidson, tiene a ordoñez disposición servicios gratuitos de asistencia lingüística. Llame al [phone].    We comply with applicable federal civil rights laws and [place] laws. We do not discriminate on the basis of race, color, national origin, age, disability sex, sexual orientation or gender identity.            Thank you!     Thank you for choosing [facility]  for your care. Our goal is always to provide you with excellent care. Hearing back from our patients is one way we can continue to improve our services. Please take a few minutes to complete the written survey that you may receive in the mail after your visit with us. Thank you!             Your Updated Medication List - Protect others around you: Learn how to safely use, store and throw away your medicines at www.disposemymeds.org.          This list is accurate as of: 9/25/17 10:24 AM.  Always use your most recent med list.                   Brand Name Dispense Instructions for use Diagnosis    atorvastatin 20 MG tablet    LIPITOR    90 tablet    Take 1 tablet (20 mg) by mouth daily    Hyperlipidemia LDL goal <130       hydrochlorothiazide 25 MG tablet    HYDRODIURIL    90 tablet    Take 1 tablet (25 mg) by mouth daily    Essential hypertension       LORazepam 0.5 MG tablet    ATIVAN    15 tablet    Take 1 tablet (0.5 mg) by mouth every 4 hours as  needed (Anxiety, Nausea/Vomiting or Sleep)    Malignant neoplasm of upper-outer quadrant of left female breast (H), Encounter for antineoplastic chemotherapy       oxybutynin 5 MG tablet    DITROPAN    90 tablet    TAKE ONE-HALF TABLET BY MOUTH TWICE A DAY    Dysuria       oxyCODONE-acetaminophen 5-325 MG per tablet    PERCOCET    20 tablet    Take 1 tablet by mouth every 4 hours as needed for pain    Post-op pain, S/p reverse total shoulder arthroplasty, right, S/P bilateral mastectomy, Trochanteric bursitis, unspecified laterality       priLOSEC 20 MG CR capsule   Generic drug:  omeprazole      Take by mouth daily        prochlorperazine 10 MG tablet    COMPAZINE    30 tablet    Take 1 tablet (10 mg) by mouth every 6 hours as needed (Nausea/Vomiting)    Encounter for antineoplastic chemotherapy       * simvastatin 20 MG tablet    ZOCOR    30 tablet    TAKE ONE TABLET BY MOUTH AT BEDTIME    Hyperlipidemia with target LDL less than 130       * simvastatin 20 MG tablet    ZOCOR    30 tablet    TAKE ONE TABLET BY MOUTH AT BEDTIME--NEEDS CHOLESTEROL CHECK PRIOR TO FURTHER REFILLS    Hyperlipidemia with target LDL less than 130       TYLENOL EX ST ARTHRITIS PAIN 500 MG tablet   Generic drug:  acetaminophen      Take 500-1,000 mg by mouth every 6 hours as needed for mild pain    Dermatitis due to sun       venlafaxine 37.5 MG 24 hr capsule    EFFEXOR XR    90 capsule    Take 1 capsule (37.5 mg) by mouth daily For one week then 2 daily for a week then 3 daily for 3-4 weeks.    Adjustment disorder with depressed mood       * Notice:  This list has 2 medication(s) that are the same as other medications prescribed for you. Read the directions carefully, and ask your doctor or other care provider to review them with you.

## 2017-09-26 ENCOUNTER — TELEPHONE (OUTPATIENT)
Dept: FAMILY MEDICINE | Facility: OTHER | Age: 67
End: 2017-09-26

## 2017-09-26 ASSESSMENT — ANXIETY QUESTIONNAIRES: GAD7 TOTAL SCORE: 3

## 2017-09-26 NOTE — TELEPHONE ENCOUNTER
Called to schedule colonoscopy. Patient would like to wait until December. We don't have the schedule out that far. She states she will call back in November.

## 2017-10-02 ENCOUNTER — HOSPITAL ENCOUNTER (EMERGENCY)
Facility: CLINIC | Age: 67
Discharge: HOME | End: 2017-10-02
Attending: NURSE PRACTITIONER | Admitting: NURSE PRACTITIONER
Payer: MEDICARE

## 2017-10-02 ENCOUNTER — APPOINTMENT (OUTPATIENT)
Dept: GENERAL RADIOLOGY | Facility: CLINIC | Age: 67
End: 2017-10-02
Attending: NURSE PRACTITIONER
Payer: MEDICARE

## 2017-10-02 VITALS
TEMPERATURE: 97.8 F | DIASTOLIC BLOOD PRESSURE: 77 MMHG | WEIGHT: 140 LBS | RESPIRATION RATE: 19 BRPM | SYSTOLIC BLOOD PRESSURE: 156 MMHG | BODY MASS INDEX: 22.5 KG/M2 | HEART RATE: 75 BPM | OXYGEN SATURATION: 92 % | HEIGHT: 66 IN

## 2017-10-02 DIAGNOSIS — S40.021A CONTUSION OF RIGHT UPPER EXTREMITY, INITIAL ENCOUNTER: ICD-10-CM

## 2017-10-02 DIAGNOSIS — S49.91XA INJURY OF RIGHT UPPER EXTREMITY, INITIAL ENCOUNTER: ICD-10-CM
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
## 2017-10-02 PROCEDURE — 96372 THER/PROPH/DIAG INJ SC/IM: CPT | Performed by: EMERGENCY MEDICINE

## 2017-10-02 PROCEDURE — 25000128 H RX IP 250 OP 636: Performed by: NURSE PRACTITIONER

## 2017-10-02 PROCEDURE — 99285 EMERGENCY DEPT VISIT HI MDM: CPT | Mod: 25 | Performed by: EMERGENCY MEDICINE

## 2017-10-02 PROCEDURE — 73090 X-RAY EXAM OF FOREARM: CPT | Mod: TC,RT

## 2017-10-02 PROCEDURE — 99285 EMERGENCY DEPT VISIT HI MDM: CPT | Mod: Z6 | Performed by: EMERGENCY MEDICINE

## 2017-10-02 RX ORDER — OXYCODONE HYDROCHLORIDE 5 MG/1
5 TABLET ORAL EVERY 6 HOURS PRN
Qty: 20 TABLET | Refills: 0 | Status: SHIPPED | OUTPATIENT
Start: 2017-10-02 | End: 2017-11-30

## 2017-10-02 RX ORDER — HYDROMORPHONE HYDROCHLORIDE 1 MG/ML
1 INJECTION, SOLUTION INTRAMUSCULAR; INTRAVENOUS; SUBCUTANEOUS ONCE
Status: COMPLETED | OUTPATIENT
Start: 2017-10-02 | End: 2017-10-02

## 2017-10-02 RX ORDER — HYDROMORPHONE HYDROCHLORIDE 1 MG/ML
1 INJECTION, SOLUTION INTRAMUSCULAR; INTRAVENOUS; SUBCUTANEOUS ONCE
Status: DISCONTINUED | OUTPATIENT
Start: 2017-10-02 | End: 2017-10-02 | Stop reason: CLARIF

## 2017-10-02 RX ADMIN — HYDROMORPHONE HYDROCHLORIDE 1 MG: 1 INJECTION, SOLUTION INTRAMUSCULAR; INTRAVENOUS; SUBCUTANEOUS at 19:03

## 2017-10-02 RX ADMIN — HYDROMORPHONE HYDROCHLORIDE 2 MG: 1 INJECTION, SOLUTION INTRAMUSCULAR; INTRAVENOUS; SUBCUTANEOUS at 17:00

## 2017-10-02 ASSESSMENT — ENCOUNTER SYMPTOMS
WOUND: 1
BRUISES/BLEEDS EASILY: 1

## 2017-10-02 NOTE — ED AVS SNAPSHOT
[facility] Emergency Department    [address] [place] [address]    Phone:  [phone]    Fax:  [phone]                                       Michaela Dorman   MRN: [id number]    Department:  [facility] Emergency Department   Date of Visit:  10/2/2017           Patient Information     Date Of Birth          1950        Your diagnoses for this visit were:     Injury of right upper extremity, initial encounter     Contusion of right upper extremity, initial encounter        You were seen by Valerie Griffin, LISA CNP.      Follow-up Information     Follow up with Phani Tapia PA-C.    Specialty:  Physician Assistant    Why:  Tomorrow at 4:30 PM    Contact information:    [address] [facility] [address]  [phone]          Follow up with [facility] Emergency Department.    Specialty:  EMERGENCY MEDICINE    Why:  If symptoms worsen    Contact information:    [address]   [place] [id number] [phone]    Additional information:    From [address]: Exit at [address] on south side of [place]. Turn right on [address]. Turn left at stoplight on [address]. [facility] will be in view two blocks ahead        Discharge Instructions         Soft Tissue Contusion  You have a contusion. This is also called a bruise. There is swelling and some bleeding under the skin. This injury generally takes a few days to a few weeks to heal.  During that time, the bruise will typically change in color from reddish, to purple-blue, to greenish-yellow, then to yellow-brown.  Home care    Elevate the injured area to reduce pain and swelling. As much as possible, sit or lie down with the injured area raised about the level of your heart. This is especially important during the first 48 hours.    Ice the injured area to help reduce pain and swelling. Wrap a cold source (ice pack or ice cubes in a plastic bag) in a thin towel. Apply to the  bruised area for 20 minutes every 1 to 2 hours the first day. Continue this 3 to 4 times a day until the pain and swelling goes away.    Unless another medication was prescribed, you can take acetaminophen, ibuprofen, or naproxen to control pain. (If you have chronic liver or kidney disease or ever had a stomach ulcer or GI bleeding, talk with your doctor before using these medicines.)  Follow up  Follow up with your health care provider or our staff as advised. Call if you are not better in 1 to 2 weeks.  When to seek medical advice   Call your health care provider right away if you have any of the following:    Increased pain or swelling    Bruise is on an arm or leg and arm or leg becomes cold, blue, numb or tingly    Signs of infection: Warmth, drainage, or increased redness or pain around the contusion    Inability to move the injured area or body part     Bruise is near your eye and you have problems with your eyesight or eye     Frequent bruising for unknown reasons  Date Last Reviewed: 4/29/2015 2000-2017 [employer]. [address] [place] [address]. All rights reserved. This information is not intended as a substitute for professional medical care. Always follow your healthcare professional's instructions.        At Risk for Compartment Syndrome  Within the arms and legs, sheets of thick tissue called fascia separate groups of muscles. These sheets of tissue create a confined space, called a compartment. Compartment syndrome occurs when a muscle swells too much within this space. Fascia does not stretch, so it cannot expand. As the muscle swells, it is squeezed tighter and tighter. This slows or stops blood flow in the muscle. Long-term damage can result. In severe cases, the muscle dies, which may result in amputation.     Compartment syndrome occurs when a muscle swells within a  compartment  made by sheets of thick tissue called fascia.   Swelling that leads to compartment  syndrome is most often due to injury. This may be from an accident or other trauma, most often one with a lot of force and damage. Repeated, high-intensity exercise can also sometimes lead to compartment syndrome.  If compartment syndrome is suspected, a healthcare provider can measure the pressure within a compartment. A needle is put into the affected area. The needle is attached to a pressure meter. Pressure readings are then taken. If compartment pressure readings become too high, blood flow and nutrient supply to the tissue may be compromised. If your healthcare provider diagnoses compartment syndrome, emergency surgery is often needed to help relieve it before damage becomes permanent.  Due to your injury, you must watch for symptoms of compartment syndrome (see below). Compartment syndrome is a dangerous condition. It can cause death of the muscle in as little as a few hours. Call your healthcare provider and get treatment right away if you have any of the symptoms listed below.  Home care  You may be given medications for swelling and pain. Follow all instructions for taking these medications.  To help reduce swelling:    Apply an ice pack wrapped in a thin towel over the injured area. Do this for 20 minutes every 1 to 2 hours the first day. Continue 3 to 4 times a day until the pain and swelling subside.    Keep the affected arm or leg raised above the level of your heart. This is most vital during the first 48 hours after an injury. Place pillows under the arm or leg when sitting or lying down and at night while sleeping.  Follow-up care  Follow up with your healthcare provider, or as advised.  When to seek medical advice  Call your healthcare provider right away if any of these occur:    Pain that gets worse when you stretch, move, or touch the muscle    Pain that does not respond to pain medications or is getting worse    Pain out of proportion to your injury    Shiny, swollen, pale skin over the  injury    Purple foot or hand color past the level of the injury    Decreased sensation in the injured muscle    Unusual weakness of the injured muscle    Inability to move the injured muscle    New or worsening numbness/tingling in the injured extremity  Date Last Reviewed: 1/18/2016 2000-2017 [employer]. [address] [place] [address]. All rights reserved. This information is not intended as a substitute for professional medical care. Always follow your healthcare professional's instructions.          Future Appointments        Provider Department Dept Phone Center    10/3/2017 11:00 AM ion Chair 3  Chair 1 [facility] Infusion [facility] [phone] [facility]    10/3/2017 4:20 PM Phani Jason PA-C [facility] [phone] [facility]    10/24/2017 10:30 AM Syeda Ferguson MD [facility] [phone] [facility]    10/24/2017 11:00 AM ion Chair 3  Chair 1 [facility] [phone] [facility]    11/14/2017 11:00 AM Iion Chair 2  air 1 [facility]fusion [facility] [phone] [facility]    12/19/2017 12:30 PM Loren Felipe MD [facility] [phone] [place]      24 Hour Appointment Hotline       To make an appointment at any [facility], call [phone] ([phone]). If you don't have a family doctor or clinic, we will help you find one. [facility] are conveniently located to serve the needs of you and your family.             Review of your medicines      START taking        Dose / Directions Last dose taken    oxyCODONE 5 MG IR tablet   Commonly known as:  ROXICODONE   Dose:  5 mg   Quantity:  20 tablet        Take 1 tablet (5 mg) by mouth every 6 hours as needed for pain   Refills:  0          Our records show that you are taking the medicines listed below. If these are incorrect, please call your family doctor or clinic.        Dose / Directions Last dose taken     atorvastatin 20 MG tablet   Commonly known as:  LIPITOR   Dose:  20 mg   Quantity:  90 tablet        Take 1 tablet (20 mg) by mouth daily   Refills:  1        hydrochlorothiazide 25 MG tablet   Commonly known as:  HYDRODIURIL   Dose:  25 mg   Quantity:  90 tablet        Take 1 tablet (25 mg) by mouth daily   Refills:  1        LORazepam 0.5 MG tablet   Commonly known as:  ATIVAN   Dose:  0.5 mg   Quantity:  15 tablet        Take 1 tablet (0.5 mg) by mouth every 4 hours as needed (Anxiety, Nausea/Vomiting or Sleep)   Refills:  0        oxybutynin 5 MG tablet   Commonly known as:  DITROPAN   Quantity:  90 tablet        TAKE ONE-HALF TABLET BY MOUTH TWICE A DAY   Refills:  0        oxyCODONE-acetaminophen 5-325 MG per tablet   Commonly known as:  PERCOCET   Dose:  1 tablet   Quantity:  20 tablet        Take 1 tablet by mouth every 4 hours as needed for pain   Refills:  0        priLOSEC 20 MG CR capsule   Generic drug:  omeprazole        Take by mouth daily   Refills:  0        prochlorperazine 10 MG tablet   Commonly known as:  COMPAZINE   Dose:  10 mg   Quantity:  30 tablet        Take 1 tablet (10 mg) by mouth every 6 hours as needed (Nausea/Vomiting)   Refills:  3        * simvastatin 20 MG tablet   Commonly known as:  ZOCOR   Quantity:  30 tablet        TAKE ONE TABLET BY MOUTH AT BEDTIME   Refills:  0        * simvastatin 20 MG tablet   Commonly known as:  ZOCOR   Quantity:  30 tablet        TAKE ONE TABLET BY MOUTH AT BEDTIME--NEEDS CHOLESTEROL CHECK PRIOR TO FURTHER REFILLS   Refills:  0        TYLENOL EX ST ARTHRITIS PAIN 500 MG tablet   Dose:  500-1000 mg   Generic drug:  acetaminophen        Take 500-1,000 mg by mouth every 6 hours as needed for mild pain   Refills:  0        venlafaxine 37.5 MG 24 hr capsule   Commonly known as:  EFFEXOR XR   Dose:  37.5 mg   Quantity:  90 capsule        Take 1 capsule (37.5 mg) by mouth daily For one week then 2 daily for a week then 3 daily for 3-4 weeks.   Refills:  1         * Notice:  This list has 2 medication(s) that are the same as other medications prescribed for you. Read the directions carefully, and ask your doctor or other care provider to review them with you.            Prescriptions were sent or printed at these locations (1 Prescription)                   [facility] [place] - [address]   [address] [place] [address]    Telephone:  [phone]   Fax:  [phone]   Hours:                  Printed at Department/Unit printer (1 of 1)         oxyCODONE (ROXICODONE) 5 MG IR tablet                Procedures and tests performed during your visit     Radius/Ulna XR, PA & LAT, right      Orders Needing Specimen Collection     None      Pending Results     No orders found from 9/30/2017 to 10/3/2017.            Pending Culture Results     No orders found from 9/30/2017 to 10/3/2017.            Pending Results Instructions     If you had any lab results that were not finalized at the time of your Discharge, you can call the ED Lab Result RN at [phone]. You will be contacted by this team for any positive Lab results or changes in treatment. The nurses are available 7 days a week from 10A to 6:30P.  You can leave a message 24 hours per day and they will return your call.        Thank you for choosing [facility]       Thank you for choosing [facility] for your care. Our goal is always to provide you with excellent care. Hearing back from our patients is one way we can continue to improve our services. Please take a few minutes to complete the written survey that you may receive in the mail after you visit with us. Thank you!        [employer] Information     [employer] gives you secure access to your electronic health record. If you see a primary care provider, you can also send messages to your care team and make appointments. If you have questions, please call your primary care clinic.  If you do not have a primary care provider, please call  [phone] and they will assist you.        Care EveryWhere ID     This is your Care EveryWhere ID. This could be used by other organizations to access your [place] medical records  [id number]        Equal Access to Services     SHIVANI ARTIS : Amy Manzano, preethi nguyễn, kayenoe kaalmajackson [place], wale pena. So [facility] [phone].    ATENCIÓN: Si habla español, tiene a ordoñez disposición servicios gratuitos de asistencia lingüística. Llame al [phone].    We comply with applicable federal civil rights laws and [place] laws. We do not discriminate on the basis of race, color, national origin, age, disability, sex, sexual orientation, or gender identity.            After Visit Summary       This is your record. Keep this with you and show to your community pharmacist(s) and doctor(s) at your next visit.

## 2017-10-02 NOTE — ED AVS SNAPSHOT
[facility] Emergency Department    [address] [place] [address]    Phone:  [phone]    Fax:  [phone]                                       Michaela Dorman   MRN: [id number]    Department:  [facility] Emergency Department   Date of Visit:  10/2/2017           After Visit Summary Signature Page     I have received my discharge instructions, and my questions have been answered. I have discussed any challenges I see with this plan with the nurse or doctor.    ..........................................................................................................................................  Patient/Patient Representative Signature      ..........................................................................................................................................  Patient Representative Print Name and Relationship to Patient    ..................................................               ................................................  Date                                            Time    ..........................................................................................................................................  Reviewed by Signature/Title    ...................................................              ..............................................  Date                                                            Time

## 2017-10-02 NOTE — ED NOTES
Had right arm pinched/slammed in between gate and barn while a draft horse was jumping over it.  +CMS to right arm although fingers are tingling.  Large hematoma present

## 2017-10-02 NOTE — ED PROVIDER NOTES
History     Chief Complaint   Patient presents with     Arm Injury     The history is provided by the patient.     Michaela Dorman is a 66 year old female who presents to the emergency department with her  with concerns of a right forearm injury. The patient states that she got her arm stuck between the gate and the barn while a draft horse was jumping over the gate. The  reports that the gate is now bent from this incident. She endorses that she can move all of her fingers but it is very painful to do so. After the incident occurred, the patient took an Oxycodone tablet and applied ice to her arm. She bruises easily but is not on blood thinners. She denies hitting her head.     I have reviewed the Medications, Allergies, Past Medical and Surgical History, and Social History in the Epic system.    Patient Active Problem List   Diagnosis     Enthesopathy of hip region     Family history of stroke (cerebrovascular)     Trochanteric bursitis     Advanced directives, counseling/discussion     Health Care Home     Baker's cyst of knee     Patella-femoral syndrome     Adjustment disorder with depressed mood     Essential hypertension     Malignant neoplasm of upper-outer quadrant of left female breast (H)     Encounter for antineoplastic chemotherapy     S/P bilateral mastectomy     Anxiety     Hyperlipidemia LDL goal <130     S/P reverse total shoulder arthroplasty, right     Prophylactic antibiotic for dental procedure indicated due to prior joint replacement     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     Subdural hematoma (H)        Past Surgical History:   Procedure Laterality Date     ARTHROPLASTY SHOULDER Right 11/17/2015     ARTHROSCOPY  KNEE  6/7/2012    Procedure:ARTHROSCOPY KNEE; right knee arthroscopy with partial lateral menisectomy; Surgeon:DAMIÁN MCALLISTER; [facility]     C LIGATE FALLOPIAN TUBE       C NONSPECIFIC PROCEDURE  1956    ankle fracture surgery     EXCISE MASS AXILLA Left 9/16/2016    Procedure: EXCISE MASS AXILLA;  Surgeon: Keyon Blanco MD;  Location: [facility]     HC REMV CATARACT EXTRACAP,INSERT LENS  6/25/2009    Right eye     INSERT PORT VASCULAR ACCESS Right 10/7/2016    Procedure: INSERT PORT VASCULAR ACCESS;  Surgeon: Keyon Blanco MD;  [facility]     MASTECTOMY SIMPLE BILATERAL Bilateral 2/8/2017    Procedure: MASTECTOMY SIMPLE BILATERAL;  Surgeon: Keyon Blanco MD;  [facility]     OPEN REDUCTION INTERNAL FIXATION FOOT Right 3/20/2015    Procedure: OPEN REDUCTION INTERNAL FIXATION FOOT;  Surgeon: Javi Herrmann DPM;  [facility]     SHOULDER SURGERY Right 11/2015       Family History   Problem Relation Age of Onset     Hypertension Mother      Thyroid Disease Mother      CEREBROVASCULAR DISEASE Mother      Hypertension Father      CEREBROVASCULAR DISEASE Father      CANCER Father      skin     Thyroid Disease Sister      DIABETES Brother      type 2     Depression Sister      Breast Cancer Sister      age 47     CANCER Sister      skin     CANCER Brother      inside nose       Social History   Substance Use Topics     Smoking status: Former Smoker     Packs/day: 3.00     Years: 10.00     Types: Cigarettes     Quit date: 12/1/1979     Smokeless tobacco: Never Used      Comment: approx. 3 packs daily     Alcohol use 0.0 oz/week     0 Standard drinks or equivalent per week      Comment: daily glass of wine        Immunization History   Administered Date(s) Administered     Influenza (High Dose) 3 valent vaccine 10/28/2014     Influenza (IIV3) 10/31/2005     Mantoux 11/10/2006     Pneumococcal (PCV 13) 08/31/2016     TD (ADULT, 7+) 07/14/1990, 03/24/2005     TDAP Vaccine (Adacel) 03/12/2012  "         Allergies   Allergen Reactions     No Known Drug Allergies        Current Outpatient Prescriptions   Medication Sig Dispense Refill     venlafaxine (EFFEXOR XR) 37.5 MG 24 hr capsule Take 1 capsule (37.5 mg) by mouth daily For one week then 2 daily for a week then 3 daily for 3-4 weeks. 90 capsule 1     oxyCODONE-acetaminophen (PERCOCET) 5-325 MG per tablet Take 1 tablet by mouth every 4 hours as needed for pain 20 tablet 0     atorvastatin (LIPITOR) 20 MG tablet Take 1 tablet (20 mg) by mouth daily 90 tablet 1     simvastatin (ZOCOR) 20 MG tablet TAKE ONE TABLET BY MOUTH AT BEDTIME--NEEDS CHOLESTEROL CHECK PRIOR TO FURTHER REFILLS (Patient not taking: Reported on 9/25/2017) 30 tablet 0     prochlorperazine (COMPAZINE) 10 MG tablet Take 1 tablet (10 mg) by mouth every 6 hours as needed (Nausea/Vomiting) 30 tablet 3     simvastatin (ZOCOR) 20 MG tablet TAKE ONE TABLET BY MOUTH AT BEDTIME (Patient not taking: Reported on 9/25/2017) 30 tablet 0     oxybutynin (DITROPAN) 5 MG tablet TAKE ONE-HALF TABLET BY MOUTH TWICE A DAY 90 tablet 0     hydrochlorothiazide (HYDRODIURIL) 25 MG tablet Take 1 tablet (25 mg) by mouth daily 90 tablet 1     LORazepam (ATIVAN) 0.5 MG tablet Take 1 tablet (0.5 mg) by mouth every 4 hours as needed (Anxiety, Nausea/Vomiting or Sleep) 15 tablet 0     acetaminophen (TYLENOL EX ST ARTHRITIS PAIN) 500 MG tablet Take 500-1,000 mg by mouth every 6 hours as needed for mild pain       omeprazole (PRILOSEC) 20 MG capsule Take by mouth daily       Review of Systems   HENT:        Negative for head injury   Musculoskeletal:        Positive for right forearm pain   Skin: Positive for wound.   Hematological: Bruises/bleeds easily.   All other systems reviewed and are negative.    Physical Exam   BP: 180/83  Pulse: 75  Temp: 97.8  F (36.6  C)  Resp: 19  Height: 167.6 cm (5' 6\")  Weight: 63.5 kg (140 lb)  SpO2: 96 %  Physical Exam   Constitutional: She is oriented to person, place, and time. She " appears well-developed and well-nourished.   HENT:   Head: Atraumatic.   Eyes: EOM are normal.   Neck: Neck supple.   Cardiovascular: Normal rate, regular rhythm, normal heart sounds and intact distal pulses.    Pulmonary/Chest: Effort normal and breath sounds normal.   Abdominal: Soft. Bowel sounds are normal.   Musculoskeletal: Normal range of motion.        Right forearm: She exhibits tenderness and swelling.   6 inch hematoma present to the right forearm.   Neurological: She is alert and oriented to person, place, and time.   Skin: Skin is warm and dry. Bruising (right forearm) noted.   Psychiatric: She has a normal mood and affect. Her behavior is normal.   Nursing note and vitals reviewed.          ED Course     ED Course     Procedures    Results for orders placed or performed during the hospital encounter of 10/02/17   Radius/Ulna XR, PA & LAT, right    Narrative    XR FOREARM RT 2 VW 10/2/2017 5:09 PM     HISTORY: pain      Impression    IMPRESSION: Negative exam.    DERICK BEE MD       Medications   HYDROmorphone (DILAUDID) injection 2 mg (2 mg Intramuscular Given 10/2/17 1700)   HYDROmorphone (PF) (DILAUDID) injection 1 mg (1 mg Intramuscular Given 10/2/17 1903)     Assessments & Plan (with Medical Decision Making)  Michaela is a 66-year-old female who presents to the emergency department today with right arm pain and swelling after she had her right arm pinched between a gate in her barn wall updraft torse was jumping over it.  Please refer to HPI and focused exam.  Patient on initial exam had quite an impressive hematoma, however, CMS remained intact.  Concerns for forearm fracture, x-ray was obtained and is negative for any acute intraosseous findings.    Patient was given intramuscular Dilaudid here as her pain was unrelieved with the oxycodone she took at home, this didn't help patient's pain and brought it down to 4 out of 10.  The patient's hematoma did continue to expand, she did continue to  maintain good circulation. I did ask Dr. Griffin to evaluate arm as well. Ace wrap was applied for compression and arm was elevated and patient was monitored for another hour.  Hematoma was reassessed at that time and was actually improving.  I feel patient is stable to be discharged home with close monitoring, with a low threshold for her return.  I did discuss in detail with patient and her  compartment syndrome and signs to watch for, she does have a follow-up appointment with her primary care provider for reevaluation tomorrow afternoon.    Patient was given additional oxycodone to take at home for pain, she was encouraged to continue to wear the Ace wrap, elevate, ice arm.  Reasons to return to the emergency department were discussed in detail, patient is agreeable to plan of care and was discharged in stable condition with her  driving.  Patient was given an additional 1 mg of IM dilaudid per her request prior to discharge.      I have reviewed the nursing notes.    I have reviewed the findings, diagnosis, plan and need for follow up with the patient.    Discharge Medication List as of 10/2/2017  6:51 PM      START taking these medications    Details   oxyCODONE (ROXICODONE) 5 MG IR tablet Take 1 tablet (5 mg) by mouth every 6 hours as needed for pain, Disp-20 tablet, R-0, Local Print             Final diagnoses:   Injury of right upper extremity, initial encounter   Contusion of right upper extremity, initial encounter     This document serves as a record of services personally performed by Valerie Griffin AP*. It was created on their behalf by Terra Mccoy, a trained medical scribe. The creation of this record is based on the provider's personal observations and the statements of the patient. This document has been checked and approved by the attending provider.    Note: Chart documentation done in part with Dragon Voice Recognition software. Although reviewed after completion, some word  and grammatical errors may remain.    10/2/2017   [facility] EMERGENCY DEPARTMENT     Valerie Griffin, LISA CNP  10/02/17 2021       Valerie Griffin APRN CNP  10/02/17 2111

## 2017-10-03 ENCOUNTER — INFUSION THERAPY VISIT (OUTPATIENT)
Dept: INFUSION THERAPY | Facility: CLINIC | Age: 67
End: 2017-10-03
Attending: INTERNAL MEDICINE
Payer: MEDICARE

## 2017-10-03 ENCOUNTER — MYC MEDICAL ADVICE (OUTPATIENT)
Dept: FAMILY MEDICINE | Facility: OTHER | Age: 67
End: 2017-10-03

## 2017-10-03 ENCOUNTER — OFFICE VISIT (OUTPATIENT)
Dept: ORTHOPEDICS | Facility: CLINIC | Age: 67
End: 2017-10-03
Payer: COMMERCIAL

## 2017-10-03 VITALS
WEIGHT: 145 LBS | SYSTOLIC BLOOD PRESSURE: 155 MMHG | OXYGEN SATURATION: 96 % | RESPIRATION RATE: 16 BRPM | HEART RATE: 67 BPM | TEMPERATURE: 97.6 F | BODY MASS INDEX: 23.4 KG/M2 | DIASTOLIC BLOOD PRESSURE: 81 MMHG

## 2017-10-03 VITALS — HEIGHT: 66 IN | TEMPERATURE: 97.6 F | BODY MASS INDEX: 23.3 KG/M2 | WEIGHT: 145 LBS

## 2017-10-03 DIAGNOSIS — S40.021A HEMATOMA OF ARM, RIGHT, INITIAL ENCOUNTER: Primary | ICD-10-CM

## 2017-10-03 DIAGNOSIS — C50.412 MALIGNANT NEOPLASM OF UPPER-OUTER QUADRANT OF LEFT FEMALE BREAST, UNSPECIFIED ESTROGEN RECEPTOR STATUS (H): ICD-10-CM

## 2017-10-03 DIAGNOSIS — Z51.11 ENCOUNTER FOR ANTINEOPLASTIC CHEMOTHERAPY: Primary | ICD-10-CM

## 2017-10-03 PROCEDURE — 96413 CHEMO IV INFUSION 1 HR: CPT

## 2017-10-03 PROCEDURE — 25000128 H RX IP 250 OP 636: Mod: JW | Performed by: INTERNAL MEDICINE

## 2017-10-03 PROCEDURE — 25000128 H RX IP 250 OP 636: Performed by: INTERNAL MEDICINE

## 2017-10-03 PROCEDURE — 99213 OFFICE O/P EST LOW 20 MIN: CPT | Performed by: ORTHOPAEDIC SURGERY

## 2017-10-03 RX ORDER — HEPARIN SODIUM (PORCINE) LOCK FLUSH IV SOLN 100 UNIT/ML 100 UNIT/ML
500 SOLUTION INTRAVENOUS EVERY 8 HOURS
Status: CANCELLED
Start: 2017-10-03

## 2017-10-03 RX ORDER — HEPARIN SODIUM (PORCINE) LOCK FLUSH IV SOLN 100 UNIT/ML 100 UNIT/ML
500 SOLUTION INTRAVENOUS EVERY 8 HOURS
Status: DISCONTINUED | OUTPATIENT
Start: 2017-10-03 | End: 2017-10-03 | Stop reason: HOSPADM

## 2017-10-03 RX ADMIN — TRASTUZUMAB 380 MG: 150 INJECTION, POWDER, LYOPHILIZED, FOR SOLUTION INTRAVENOUS at 11:34

## 2017-10-03 RX ADMIN — SODIUM CHLORIDE 250 ML: 9 INJECTION, SOLUTION INTRAVENOUS at 11:25

## 2017-10-03 RX ADMIN — SODIUM CHLORIDE, PRESERVATIVE FREE 500 UNITS: 5 INJECTION INTRAVENOUS at 12:19

## 2017-10-03 ASSESSMENT — PAIN SCALES - GENERAL
PAINLEVEL: MODERATE PAIN (5)
PAINLEVEL: MODERATE PAIN (5)

## 2017-10-03 NOTE — TELEPHONE ENCOUNTER
Huddled with Ortho: Yecenia Cordero .  Recommend ortho seeing this patient today.  They will follow up with her.    Will cancel appointment with [facility] this afternoon.    Larry Meadows, RN, BSN

## 2017-10-03 NOTE — LETTER
10/3/2017         RE: Michaela Dorman  [address]  [place] [address]        Dear Colleague,    Thank you for referring your patient, Michaela Dorman, to the [facility]. Please see a copy of my visit note below.    ORTHOPEDIC CLINIC CONSULT      Michaela Dorman is a 66 year old female who is seen in consultation at the request of ED provider TOMMY Griffin.  History of Present illness:  Michaela presents for evaluation of:   1.) Right forearm injury  Onset:  10/2/17 (s/p 1 day)    Symptoms brought on by she got her arm stuck between the gate and the barn while a draft horse was jumping over the gate..   Character:  dull ache.    Progression of symptoms:  better.    Previous similar pain: no .   Pain Level:  5/10.   Previous treatments:  ice and Pain medication.  Currently on Blood thinners? No  Diagnosis of Diabetes? No    Patient is being seen on an urgent basis per request of internal medicine.  Orthopedic Consult    Patient presents with:  Consult      The above note was reviewed and verified.    The swelling has indeed gotten better and her mind.  She does not have severe discomfort. She does not have numbness or tingling. She does not have loss of motor control.    Prior history of related problems:  Not applicable    Patient's past medical, surgical, social and family histories reviewed.     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     Subdural hematoma (H)        Past Surgical History:   Procedure Laterality Date     ARTHROPLASTY SHOULDER Right 11/17/2015     ARTHROSCOPY KNEE  6/7/2012    Procedure:ARTHROSCOPY KNEE; right knee arthroscopy with partial lateral menisectomy; Surgeon:DAMIÁN MCALLISTER DANIE; Location:[facility]     C LIGATE  FALLOPIAN TUBE       C NONSPECIFIC PROCEDURE  1956    ankle fracture surgery     EXCISE MASS AXILLA Left 9/16/2016    Procedure: EXCISE MASS AXILLA;  Surgeon: Keyon Blanco MD;  [facility]     HC REMV CATARACT EXTRACAP,INSERT LENS  6/25/2009    Right eye     INSERT PORT VASCULAR ACCESS Right 10/7/2016    Procedure: INSERT PORT VASCULAR ACCESS;  Surgeon: Keyon Blanco MD;  [facility]     MASTECTOMY SIMPLE BILATERAL Bilateral 2/8/2017    Procedure: MASTECTOMY SIMPLE BILATERAL;  Surgeon: Keyon Blanco MD;  [facility]     OPEN REDUCTION INTERNAL FIXATION FOOT Right 3/20/2015    Procedure: OPEN REDUCTION INTERNAL FIXATION FOOT;  Surgeon: Javi Herrmann DPM;  Location: [facility]     SHOULDER SURGERY Right 11/2015       Medications:    Current Outpatient Prescriptions on File Prior to Visit:  oxyCODONE (ROXICODONE) 5 MG IR tablet Take 1 tablet (5 mg) by mouth every 6 hours as needed for pain   venlafaxine (EFFEXOR XR) 37.5 MG 24 hr capsule Take 1 capsule (37.5 mg) by mouth daily For one week then 2 daily for a week then 3 daily for 3-4 weeks.   oxyCODONE-acetaminophen (PERCOCET) 5-325 MG per tablet Take 1 tablet by mouth every 4 hours as needed for pain   atorvastatin (LIPITOR) 20 MG tablet Take 1 tablet (20 mg) by mouth daily   prochlorperazine (COMPAZINE) 10 MG tablet Take 1 tablet (10 mg) by mouth every 6 hours as needed (Nausea/Vomiting)   oxybutynin (DITROPAN) 5 MG tablet TAKE ONE-HALF TABLET BY MOUTH TWICE A DAY   hydrochlorothiazide (HYDRODIURIL) 25 MG tablet Take 1 tablet (25 mg) by mouth daily   LORazepam (ATIVAN) 0.5 MG tablet Take 1 tablet (0.5 mg) by mouth every 4 hours as needed (Anxiety, Nausea/Vomiting or Sleep)   acetaminophen (TYLENOL EX ST ARTHRITIS PAIN) 500 MG tablet Take 500-1,000 mg by mouth every 6 hours as needed for mild pain   omeprazole (PRILOSEC) 20 MG capsule Take by mouth daily   [DISCONTINUED] simvastatin (ZOCOR) 20 MG tablet TAKE ONE TABLET BY MOUTH AT  BEDTIME--NEEDS CHOLESTEROL CHECK PRIOR TO FURTHER REFILLS (Patient not taking: Reported on 9/25/2017)   simvastatin (ZOCOR) 20 MG tablet TAKE ONE TABLET BY MOUTH AT BEDTIME (Patient not taking: Reported on 9/25/2017)     Current Facility-Administered Medications on File Prior to Visit:  sodium chloride (PF) 0.9% PF flush 10 mL   heparin 100 UNIT/ML injection 500 Units       Allergies   Allergen Reactions     No Known Drug Allergies        Social History     Occupational History      Homemaker     Social History Main Topics     Smoking status: Former Smoker     Packs/day: 3.00     Years: 10.00     Types: Cigarettes     Quit date: 12/1/1979     Smokeless tobacco: Never Used      Comment: approx. 3 packs daily     Alcohol use 0.0 oz/week     0 Standard drinks or equivalent per week      Comment: daily glass of wine     Drug use: No     Sexual activity: Yes     Partners: Male       Family History   Problem Relation Age of Onset     Hypertension Mother      Thyroid Disease Mother      CEREBROVASCULAR DISEASE Mother      Hypertension Father      CEREBROVASCULAR DISEASE Father      CANCER Father      skin     Thyroid Disease Sister      DIABETES Brother      type 2     Depression Sister      Breast Cancer Sister      age 47     CANCER Sister      skin     CANCER Brother      inside nose       REVIEW OF SYSTEMS    General: negative for fever or fatigue patient is undergoing treatment for cancer though.    Psych:  negative for anxiety or depression     Ophthalmic:  Corrective lenses?  No    ENT:  Hearing difficulty? No    CV: negative for chest pain, venous insuffiencey     Endocrine:  negative for diabetes     Urology:  negative for kidney disease    Resp:  Normal respiratory effort     Skin: negative for cuts/sores or redness    Musculoskeletal: as above    Neurologic:negative for numbness/tingling    Hematologic: negative for bleeding disorder, does not use of prescription anticoagulants         Physical Exam:    Vitals:  "Temp 97.6  F (36.4  C) (Temporal)  Ht 1.676 m (5' 6\")  Wt 65.8 kg (145 lb)  BMI 23.4 kg/m2  BMI= Body mass index is 23.4 kg/(m^2).    GENERAL APPEARANCE:  Healthy, alert, no distress    SKIN:  negative for suspicious lesions or rashes    NEURO: Normal strength and tone, mentation intact and speech normal    PSYCH:   Mentation appears Normal and affect normal/bright    RESPIRATORY: negative for respiratory distress.    EYES: negative for Conjunctivitis    Cardiovascular: no Edema                radial Present                Toes warm and well perfused, brisk capillary refill.      GAIT: non-antalgic    JOINT/EXTREMITIES:    Patient's shows a significant swelling over the dorsal proximal one third of her right forearm.  There is no break in the skin.  She has ecchymosis over the dorsum of the forearm.    The mass that she has by palpation is subcutaneous. At this point it is somewhat firm.    She has no limitations to elbow range of motion, she has no limitations to digits or wrist range of motion.    She has no sensory change and no vascular changes.      Radiographs: I chose to not obtain x-rays today.  w.  Impression:     ICD-10-CM    1. Hematoma of arm, right, initial encounter S40.021A          Patient clinically has a subcutaneous hematoma to her right proximal forearm from a direct blow.            Plan:  The above was reviewed with Michaela    We talked about different considerations including evacuation of hematoma. We both agree that at this point we'll simply use localized care. This includes edema control and rest. She was fitted with a compressive sleeve today.    Return to clinic 2, weeks, or PRN    Jean Bearden MD              Again, thank you for allowing me to participate in the care of your patient.        Sincerely,        Jean Bearden MD    "

## 2017-10-03 NOTE — MR AVS SNAPSHOT
After Visit Summary   10/3/2017    Michaela Dorman    MRN: [id number]           Patient Information     Date Of Birth          1950        Visit Information        Provider Department      10/3/2017 12:30 PM Jean Bearden MD [facility]        Today's Diagnoses     Hematoma of arm, right, initial encounter    -  1       Follow-ups after your visit        Your next 10 appointments already scheduled     Oct 24, 2017 10:30 AM CDT   Return Visit with Syeda Ferguson MD   [facility] ([facility])    [address] [place] [address]   [phone]            Oct 24, 2017 11:00 AM CDT   Level 2 with NL INFUSION CHAIR 3   [facility] ([facility])    [address] [place] [address] [address] [place] [address]   [phone]            Nov 14, 2017 11:00 AM CST   Level 2 with NL INFUSION CHAIR 2   [facility] ([facility])    [address] [place] [address] [place] [address]   [phone]            Dec 19, 2017 12:30 PM CST   Return Visit with Loren Felipe MD   [facility] ([facility])    [address] [place] [address] [phone]              Who to contact     If you have questions or need follow up information about today's clinic visit or your schedule please contact [facility] directly at [phone].  Normal or non-critical lab and imaging results will be communicated to you by MyChart, letter or phone within 4 business days after the clinic has received the results. If you do not hear from us within 7 days, please contact the clinic through MyChart or phone. If you have a critical or abnormal lab result, we will notify you by phone as soon as possible.  Submit refill requests through [employer] or call your pharmacy and they will forward the refill request to us. Please allow 3 business days for your refill  "to be completed.          Additional Information About Your Visit        [employer] Information     [employer] gives you secure access to your electronic health record. If you see a primary care provider, you can also send messages to your care team and make appointments. If you have questions, please call your primary care clinic.  If you do not have a primary care provider, please call [phone] and they will assist you.        Care EveryWhere ID     This is your Care EveryWhere ID. This could be used by other organizations to access your [place] medical records  [id number]        Your Vitals Were     Temperature Height BMI (Body Mass Index)             97.6  F (36.4  C) (Temporal) 1.676 m (5' 6\") 23.4 kg/m2          Blood Pressure from Last 3 Encounters:   10/03/17 155/81   10/02/17 156/77   09/25/17 120/64    Weight from Last 3 Encounters:   10/03/17 65.8 kg (145 lb)   10/03/17 65.8 kg (145 lb)   10/02/17 63.5 kg (140 lb)              Today, you had the following     No orders found for display       Primary Care Provider Office Phone # Fax #    Phani Tapia PA-C [phone]       [address] [facility] [address]        Equal Access to Services     SHIVANI ARTIS : Hadii aad ku hadasho Soomaali, waaxda luqadaha, qaybta kaalmada adeegyada, waxay milena pena. So [facility] [phone].    ATENCIÓN: Si habla español, tiene a ordoñez disposición servicios gratuitos de asistencia lingüística. Llame al [phone].    We comply with applicable federal civil rights laws and [place] laws. We do not discriminate on the basis of race, color, national origin, age, disability, sex, sexual orientation, or gender identity.            Thank you!     Thank you for choosing [facility]  for your care. Our goal is always to provide you with excellent care. Hearing back from our patients is one way we can continue to improve our services. Please take a few minutes to complete the written " survey that you may receive in the mail after your visit with us. Thank you!             Your Updated Medication List - Protect others around you: Learn how to safely use, store and throw away your medicines at www.disposemymeds.org.          This list is accurate as of: 10/3/17 12:59 PM.  Always use your most recent med list.                   Brand Name Dispense Instructions for use Diagnosis    atorvastatin 20 MG tablet    LIPITOR    90 tablet    Take 1 tablet (20 mg) by mouth daily    Hyperlipidemia LDL goal <130       hydrochlorothiazide 25 MG tablet    HYDRODIURIL    90 tablet    Take 1 tablet (25 mg) by mouth daily    Essential hypertension       LORazepam 0.5 MG tablet    ATIVAN    15 tablet    Take 1 tablet (0.5 mg) by mouth every 4 hours as needed (Anxiety, Nausea/Vomiting or Sleep)    Malignant neoplasm of upper-outer quadrant of left female breast (H), Encounter for antineoplastic chemotherapy       oxybutynin 5 MG tablet    DITROPAN    90 tablet    TAKE ONE-HALF TABLET BY MOUTH TWICE A DAY    Dysuria       oxyCODONE 5 MG IR tablet    ROXICODONE    20 tablet    Take 1 tablet (5 mg) by mouth every 6 hours as needed for pain        oxyCODONE-acetaminophen 5-325 MG per tablet    PERCOCET    20 tablet    Take 1 tablet by mouth every 4 hours as needed for pain    Post-op pain, S/P reverse total shoulder arthroplasty, right, S/P bilateral mastectomy, Trochanteric bursitis, unspecified laterality       priLOSEC 20 MG CR capsule   Generic drug:  omeprazole      Take by mouth daily        prochlorperazine 10 MG tablet    COMPAZINE    30 tablet    Take 1 tablet (10 mg) by mouth every 6 hours as needed (Nausea/Vomiting)    Encounter for antineoplastic chemotherapy       simvastatin 20 MG tablet    ZOCOR    30 tablet    TAKE ONE TABLET BY MOUTH AT BEDTIME    Hyperlipidemia with target LDL less than 130       TYLENOL EX ST ARTHRITIS PAIN 500 MG tablet   Generic drug:  acetaminophen      Take 500-1,000 mg by mouth  every 6 hours as needed for mild pain    Dermatitis due to sun       venlafaxine 37.5 MG 24 hr capsule    EFFEXOR XR    90 capsule    Take 1 capsule (37.5 mg) by mouth daily For one week then 2 daily for a week then 3 daily for 3-4 weeks.    Adjustment disorder with depressed mood

## 2017-10-03 NOTE — PATIENT INSTRUCTIONS
Pt to return on 10/24 for Herceptin. Copies of medication list and upcoming appointments given prior to discharge.

## 2017-10-03 NOTE — PROGRESS NOTES
Here for chemotherapy, tolerated well.  Labs/BSA/Dosing verified by RN x2. Positive blood return noted pre and post chemotherapy administration. Pt discharged in stable condition.

## 2017-10-03 NOTE — ED NOTES
"Pt requesting an additional shot of dilaudid prior to discharge. \"It worked so well I just want it to last\"  "

## 2017-10-03 NOTE — NURSING NOTE
"Chief Complaint   Patient presents with     Consult       Initial Temp 97.6  F (36.4  C) (Temporal)  Ht 1.676 m (5' 6\")  Wt 65.8 kg (145 lb)  BMI 23.4 kg/m2 Estimated body mass index is 23.4 kg/(m^2) as calculated from the following:    Height as of this encounter: 1.676 m (5' 6\").    Weight as of this encounter: 65.8 kg (145 lb).  Medication Reconciliation: complete   KIA Rooney  "

## 2017-10-03 NOTE — PROGRESS NOTES
ORTHOPEDIC CLINIC CONSULT      Michaela Dorman is a 66 year old female who is seen in consultation at the request of ED provider TOMMY Griffin.  History of Present illness:  Michaela presents for evaluation of:   1.) Right forearm injury  Onset:  10/2/17 (s/p 1 day)    Symptoms brought on by she got her arm stuck between the gate and the barn while a draft horse was jumping over the gate..   Character:  dull ache.    Progression of symptoms:  better.    Previous similar pain: no .   Pain Level:  5/10.   Previous treatments:  ice and Pain medication.  Currently on Blood thinners? No  Diagnosis of Diabetes? No    Patient is being seen on an urgent basis per request of internal medicine.  Orthopedic Consult    Patient presents with:  Consult      The above note was reviewed and verified.    The swelling has indeed gotten better and her mind.  She does not have severe discomfort. She does not have numbness or tingling. She does not have loss of motor control.    Prior history of related problems:  Not applicable    Patient's past medical, surgical, social and family histories reviewed.     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     Subdural hematoma (H)        Past Surgical History:   Procedure Laterality Date     ARTHROPLASTY SHOULDER Right 11/17/2015     ARTHROSCOPY KNEE  6/7/2012    Procedure:ARTHROSCOPY KNEE; right knee arthroscopy with partial lateral menisectomy; Surgeon:DAMIÁN MCALLISTER; Location:[facility] OR     C LIGATE FALLOPIAN TUBE       C NONSPECIFIC PROCEDURE  1956    ankle fracture surgery     EXCISE MASS AXILLA Left 9/16/2016    Procedure: EXCISE MASS AXILLA;  Surgeon: Keyon Blanco MD;  Location: [facility] OR     HC REMV CATARACT EXTRACAP,INSERT LENS   6/25/2009    Right eye     INSERT PORT VASCULAR ACCESS Right 10/7/2016    Procedure: INSERT PORT VASCULAR ACCESS;  Surgeon: Keyon Blanco MD;  Location: [facility] OR     MASTECTOMY SIMPLE BILATERAL Bilateral 2/8/2017    Procedure: MASTECTOMY SIMPLE BILATERAL;  Surgeon: Keyon Blanco MD;  Location: [facility] OR     OPEN REDUCTION INTERNAL FIXATION FOOT Right 3/20/2015    Procedure: OPEN REDUCTION INTERNAL FIXATION FOOT;  Surgeon: Javi Herrmann DPM;  Location: [facility] OR     SHOULDER SURGERY Right 11/2015       Medications:    Current Outpatient Prescriptions on File Prior to Visit:  oxyCODONE (ROXICODONE) 5 MG IR tablet Take 1 tablet (5 mg) by mouth every 6 hours as needed for pain   venlafaxine (EFFEXOR XR) 37.5 MG 24 hr capsule Take 1 capsule (37.5 mg) by mouth daily For one week then 2 daily for a week then 3 daily for 3-4 weeks.   oxyCODONE-acetaminophen (PERCOCET) 5-325 MG per tablet Take 1 tablet by mouth every 4 hours as needed for pain   atorvastatin (LIPITOR) 20 MG tablet Take 1 tablet (20 mg) by mouth daily   prochlorperazine (COMPAZINE) 10 MG tablet Take 1 tablet (10 mg) by mouth every 6 hours as needed (Nausea/Vomiting)   oxybutynin (DITROPAN) 5 MG tablet TAKE ONE-HALF TABLET BY MOUTH TWICE A DAY   hydrochlorothiazide (HYDRODIURIL) 25 MG tablet Take 1 tablet (25 mg) by mouth daily   LORazepam (ATIVAN) 0.5 MG tablet Take 1 tablet (0.5 mg) by mouth every 4 hours as needed (Anxiety, Nausea/Vomiting or Sleep)   acetaminophen (TYLENOL EX ST ARTHRITIS PAIN) 500 MG tablet Take 500-1,000 mg by mouth every 6 hours as needed for mild pain   omeprazole (PRILOSEC) 20 MG capsule Take by mouth daily   [DISCONTINUED] simvastatin (ZOCOR) 20 MG tablet TAKE ONE TABLET BY MOUTH AT BEDTIME--NEEDS CHOLESTEROL CHECK PRIOR TO FURTHER REFILLS (Patient not taking: Reported on 9/25/2017)   simvastatin (ZOCOR) 20 MG tablet TAKE ONE TABLET BY MOUTH AT BEDTIME (Patient not taking: Reported on 9/25/2017)     Current Facility-Administered  "Medications on File Prior to Visit:  sodium chloride (PF) 0.9% PF flush 10 mL   heparin 100 UNIT/ML injection 500 Units       Allergies   Allergen Reactions     No Known Drug Allergies        Social History     Occupational History      Homemaker     Social History Main Topics     Smoking status: Former Smoker     Packs/day: 3.00     Years: 10.00     Types: Cigarettes     Quit date: 12/1/1979     Smokeless tobacco: Never Used      Comment: approx. 3 packs daily     Alcohol use 0.0 oz/week     0 Standard drinks or equivalent per week      Comment: daily glass of wine     Drug use: No     Sexual activity: Yes     Partners: Male       Family History   Problem Relation Age of Onset     Hypertension Mother      Thyroid Disease Mother      CEREBROVASCULAR DISEASE Mother      Hypertension Father      CEREBROVASCULAR DISEASE Father      CANCER Father      skin     Thyroid Disease Sister      DIABETES Brother      type 2     Depression Sister      Breast Cancer Sister      age 47     CANCER Sister      skin     CANCER Brother      inside nose       REVIEW OF SYSTEMS    General: negative for fever or fatigue patient is undergoing treatment for cancer though.    Psych:  negative for anxiety or depression     Ophthalmic:  Corrective lenses?  No    ENT:  Hearing difficulty? No    CV: negative for chest pain, venous insuffiencey     Endocrine:  negative for diabetes     Urology:  negative for kidney disease    Resp:  Normal respiratory effort     Skin: negative for cuts/sores or redness    Musculoskeletal: as above    Neurologic:negative for numbness/tingling    Hematologic: negative for bleeding disorder, does not use of prescription anticoagulants         Physical Exam:    Vitals: Temp 97.6  F (36.4  C) (Temporal)  Ht 1.676 m (5' 6\")  Wt 65.8 kg (145 lb)  BMI 23.4 kg/m2  BMI= Body mass index is 23.4 kg/(m^2).    GENERAL APPEARANCE:  Healthy, alert, no distress    SKIN:  negative for suspicious lesions or rashes    NEURO: " Normal strength and tone, mentation intact and speech normal    PSYCH:   Mentation appears Normal and affect normal/bright    RESPIRATORY: negative for respiratory distress.    EYES: negative for Conjunctivitis    Cardiovascular: no Edema                radial Present                Toes warm and well perfused, brisk capillary refill.      GAIT: non-antalgic    JOINT/EXTREMITIES:    Patient's shows a significant swelling over the dorsal proximal one third of her right forearm.  There is no break in the skin.  She has ecchymosis over the dorsum of the forearm.    The mass that she has by palpation is subcutaneous. At this point it is somewhat firm.    She has no limitations to elbow range of motion, she has no limitations to digits or wrist range of motion.    She has no sensory change and no vascular changes.      Radiographs: I chose to not obtain x-rays today.  w.  Impression:     ICD-10-CM    1. Hematoma of arm, right, initial encounter S40.021A          Patient clinically has a subcutaneous hematoma to her right proximal forearm from a direct blow.            Plan:  The above was reviewed with Michaela    We talked about different considerations including evacuation of hematoma. We both agree that at this point we'll simply use localized care. This includes edema control and rest. She was fitted with a compressive sleeve today.    Return to clinic 2, weeks, or PRN    Jean Bearden MD

## 2017-10-03 NOTE — MR AVS SNAPSHOT
After Visit Summary   10/3/2017    Michaela Dorman    MRN: [id number]           Patient Information     Date Of Birth          1950        Visit Information        Provider Department      10/3/2017 11:00 AM NL INFUSION CHAIR 3 [facility]        Today's Diagnoses     Encounter for antineoplastic chemotherapy    -  1    Malignant neoplasm of upper-outer quadrant of left female breast, unspecified estrogen receptor status (H)          Care Instructions    Pt to return on 10/24 for Herceptin. Copies of medication list and upcoming appointments given prior to discharge.           Follow-ups after your visit        Follow-up notes from your care team     Return in 3 weeks (on 10/24/2017).      Your next 10 appointments already scheduled     Oct 24, 2017 10:30 AM CDT   Return Visit with Syeda Ferguson MD   [facility] ([address] [address]   [phone]            Oct 24, 2017 11:00 AM CDT   Level 2 with NL INFUSION CHAIR 3   [facility] ([facility])    [address] [place] [address] [address] [place] [address]   [phone]            Nov 14, 2017 11:00 AM CST   Level 2 with NL INFUSION CHAIR 2   [facility] ([facility])    [address] [place] [address] [address] [place] [address]   [phone]            Dec 19, 2017 12:30 PM CST   Return Visit with Loren Felipe MD   [facility] ([facility])    [address] [place] [id number] [phone]              Who to contact     If you have questions or need follow up information about today's clinic visit or your schedule please contact [facility] directly at [phone].  Normal or non-critical lab and imaging results will be communicated to you by MyChart, letter or phone within 4 business days after the clinic has received  the results. If you do not hear from us within 7 days, please contact the clinic through [employer] or phone. If you have a critical or abnormal lab result, we will notify you by phone as soon as possible.  Submit refill requests through [employer] or call your pharmacy and they will forward the refill request to us. Please allow 3 business days for your refill to be completed.          Additional Information About Your Visit        [employer] Information     [employer] gives you secure access to your electronic health record. If you see a primary care provider, you can also send messages to your care team and make appointments. If you have questions, please call your primary care clinic.  If you do not have a primary care provider, please call [phone] and they will assist you.        Care EveryWhere ID     This is your Care EveryWhere ID. This could be used by other organizations to access your [place] medical records  [id number]        Your Vitals Were     Pulse Temperature Respirations Pulse Oximetry BMI (Body Mass Index)       67 97.6  F (36.4  C) (Temporal) 16 96% 23.4 kg/m2        Blood Pressure from Last 3 Encounters:   10/03/17 155/81   10/02/17 156/77   09/25/17 120/64    Weight from Last 3 Encounters:   10/03/17 65.8 kg (145 lb)   10/03/17 65.8 kg (145 lb)   10/02/17 63.5 kg (140 lb)              Today, you had the following     No orders found for display       Primary Care Provider Office Phone # Fax #    Phani Tapia PA-C [phone]       [address] [facility] [address]        Equal Access to Services     DAYAMI ARTIS : Hadii aad ku hadasho Soomaali, waaxda luqadaha, qaybta kaalmada padmini, wale allen . So [facility] [phone].    ATENCIÓN: Si habla español, tiene a ordoñez disposición servicios gratuitos de asistencia lingüística. Llame al [phone].    We comply with applicable federal civil rights laws and [place] laws. We do not discriminate on the basis of  race, color, national origin, age, disability, sex, sexual orientation, or gender identity.            Thank you!     Thank you for choosing [facility]  for your care. Our goal is always to provide you with excellent care. Hearing back from our patients is one way we can continue to improve our services. Please take a few minutes to complete the written survey that you may receive in the mail after your visit with us. Thank you!             Your Updated Medication List - Protect others around you: Learn how to safely use, store and throw away your medicines at www.disposemymeds.org.          This list is accurate as of: 10/3/17  1:32 PM.  Always use your most recent med list.                   Brand Name Dispense Instructions for use Diagnosis    atorvastatin 20 MG tablet    LIPITOR    90 tablet    Take 1 tablet (20 mg) by mouth daily    Hyperlipidemia LDL goal <130       hydrochlorothiazide 25 MG tablet    HYDRODIURIL    90 tablet    Take 1 tablet (25 mg) by mouth daily    Essential hypertension       LORazepam 0.5 MG tablet    ATIVAN    15 tablet    Take 1 tablet (0.5 mg) by mouth every 4 hours as needed (Anxiety, Nausea/Vomiting or Sleep)    Malignant neoplasm of upper-outer quadrant of left female breast (H), Encounter for antineoplastic chemotherapy       oxybutynin 5 MG tablet    DITROPAN    90 tablet    TAKE ONE-HALF TABLET BY MOUTH TWICE A DAY    Dysuria       oxyCODONE 5 MG IR tablet    ROXICODONE    20 tablet    Take 1 tablet (5 mg) by mouth every 6 hours as needed for pain        oxyCODONE-acetaminophen 5-325 MG per tablet    PERCOCET    20 tablet    Take 1 tablet by mouth every 4 hours as needed for pain    Post-op pain, S/P reverse total shoulder arthroplasty, right, S/P bilateral mastectomy, Trochanteric bursitis, unspecified laterality       priLOSEC 20 MG CR capsule   Generic drug:  omeprazole      Take by mouth daily        prochlorperazine 10 MG tablet    COMPAZINE    30  tablet    Take 1 tablet (10 mg) by mouth every 6 hours as needed (Nausea/Vomiting)    Encounter for antineoplastic chemotherapy       simvastatin 20 MG tablet    ZOCOR    30 tablet    TAKE ONE TABLET BY MOUTH AT BEDTIME    Hyperlipidemia with target LDL less than 130       TYLENOL EX ST ARTHRITIS PAIN 500 MG tablet   Generic drug:  acetaminophen      Take 500-1,000 mg by mouth every 6 hours as needed for mild pain    Dermatitis due to sun       venlafaxine 37.5 MG 24 hr capsule    EFFEXOR XR    90 capsule    Take 1 capsule (37.5 mg) by mouth daily For one week then 2 daily for a week then 3 daily for 3-4 weeks.    Adjustment disorder with depressed mood

## 2017-10-04 ENCOUNTER — MYC MEDICAL ADVICE (OUTPATIENT)
Dept: FAMILY MEDICINE | Facility: OTHER | Age: 67
End: 2017-10-04

## 2017-10-04 DIAGNOSIS — Z96.611 S/P REVERSE TOTAL SHOULDER ARTHROPLASTY, RIGHT: Primary | ICD-10-CM

## 2017-10-05 RX ORDER — AZITHROMYCIN 250 MG/1
500 TABLET, FILM COATED ORAL ONCE
Qty: 2 TABLET | Refills: 0 | Status: SHIPPED | OUTPATIENT
Start: 2017-10-05 | End: 2017-12-04

## 2017-10-09 ENCOUNTER — MYC MEDICAL ADVICE (OUTPATIENT)
Dept: FAMILY MEDICINE | Facility: OTHER | Age: 67
End: 2017-10-09

## 2017-10-10 ENCOUNTER — OFFICE VISIT (OUTPATIENT)
Dept: FAMILY MEDICINE | Facility: OTHER | Age: 67
End: 2017-10-10
Payer: COMMERCIAL

## 2017-10-10 VITALS — HEART RATE: 84 BPM | WEIGHT: 145 LBS | RESPIRATION RATE: 16 BRPM | TEMPERATURE: 98 F | BODY MASS INDEX: 23.4 KG/M2

## 2017-10-10 DIAGNOSIS — R39.9 UTI SYMPTOMS: Primary | ICD-10-CM

## 2017-10-10 DIAGNOSIS — N30.00 ACUTE CYSTITIS WITHOUT HEMATURIA: ICD-10-CM

## 2017-10-10 DIAGNOSIS — R82.90 NONSPECIFIC FINDING ON EXAMINATION OF URINE: ICD-10-CM

## 2017-10-10 LAB
ALBUMIN UR-MCNC: NEGATIVE MG/DL
APPEARANCE UR: CLEAR
BACTERIA #/AREA URNS HPF: ABNORMAL /HPF
BILIRUB UR QL STRIP: NEGATIVE
COLOR UR AUTO: YELLOW
GLUCOSE UR STRIP-MCNC: NEGATIVE MG/DL
HGB UR QL STRIP: ABNORMAL
KETONES UR STRIP-MCNC: NEGATIVE MG/DL
LEUKOCYTE ESTERASE UR QL STRIP: ABNORMAL
NITRATE UR QL: POSITIVE
PH UR STRIP: 6.5 PH (ref 5–7)
RBC #/AREA URNS AUTO: ABNORMAL /HPF
SOURCE: ABNORMAL
SP GR UR STRIP: 1.01 (ref 1–1.03)
SPECIMEN SOURCE: NORMAL
UROBILINOGEN UR STRIP-ACNC: 0.2 EU/DL (ref 0.2–1)
WBC #/AREA URNS AUTO: ABNORMAL /HPF
WET PREP SPEC: NORMAL

## 2017-10-10 PROCEDURE — 87086 URINE CULTURE/COLONY COUNT: CPT | Performed by: PHYSICIAN ASSISTANT

## 2017-10-10 PROCEDURE — 99213 OFFICE O/P EST LOW 20 MIN: CPT | Performed by: PHYSICIAN ASSISTANT

## 2017-10-10 PROCEDURE — 87210 SMEAR WET MOUNT SALINE/INK: CPT | Performed by: PHYSICIAN ASSISTANT

## 2017-10-10 PROCEDURE — 81001 URINALYSIS AUTO W/SCOPE: CPT | Performed by: PHYSICIAN ASSISTANT

## 2017-10-10 RX ORDER — SULFAMETHOXAZOLE/TRIMETHOPRIM 800-160 MG
1 TABLET ORAL DAILY
Qty: 90 TABLET | Refills: 3 | Status: SHIPPED | OUTPATIENT
Start: 2017-10-10 | End: 2017-10-24

## 2017-10-10 RX ORDER — SULFAMETHOXAZOLE/TRIMETHOPRIM 800-160 MG
1 TABLET ORAL 2 TIMES DAILY
Qty: 20 TABLET | Refills: 0 | Status: SHIPPED | OUTPATIENT
Start: 2017-10-10 | End: 2017-10-24

## 2017-10-10 ASSESSMENT — PAIN SCALES - GENERAL: PAINLEVEL: MILD PAIN (3)

## 2017-10-10 NOTE — MR AVS SNAPSHOT
After Visit Summary   10/10/2017    Michaela Dorman    MRN: [id number]           Patient Information     Date Of Birth          1950        Visit Information        Provider Department      10/10/2017 4:20 PM Phani Tapia PA-C [facility]        Today's Diagnoses     UTI symptoms    -  1    Nonspecific finding on examination of urine        Acute cystitis without hematuria           Follow-ups after your visit        Your next 10 appointments already scheduled     Oct 24, 2017 10:30 AM CDT   [facility]    [address] [place] [address]   [phone]            Oct 24, 2017 11:00 AM CDT   Level 2 with NL INFUSION CHAIR 3   [facility] ([facility])    [address] [place] [address] [place] [address]   [phone]            Nov 14, 2017 11:00 AM CST   Level 2 with NL INFUSION CHAIR 2   [facility] ([facility])    [address] [place] [address] [place] [address]   [phone]            Dec 19, 2017 12:30 PM CST   Return Visit with Loren Felipe MD   [facility] ([facility])    [address] [place] [address] [phone]              Future tests that were ordered for you today     Open Future Orders        Priority Expected Expires Ordered    *UA reflex to Microscopic and Culture ([facility] (except [place] and [place]) Routine 10/24/2017 11/10/2017 10/10/2017            Who to contact     If you have questions or need follow up information about today's clinic visit or your schedule please contact [facility] directly at [phone].  Normal or non-critical lab and imaging results will be communicated to you by MyChart, letter or phone within 4 business days after the clinic has received the results. If you do not hear  from us within 7 days, please contact the clinic through [employer] or phone. If you have a critical or abnormal lab result, we will notify you by phone as soon as possible.  Submit refill requests through [employer] or call your pharmacy and they will forward the refill request to us. Please allow 3 business days for your refill to be completed.          Additional Information About Your Visit        [employer] Information     [employer] gives you secure access to your electronic health record. If you see a primary care provider, you can also send messages to your care team and make appointments. If you have questions, please call your primary care clinic.  If you do not have a primary care provider, please call [phone] and they will assist you.        Care EveryWhere ID     This is your Care EveryWhere ID. This could be used by other organizations to access your [facility] medical records  [id number]        Your Vitals Were     Pulse Temperature Respirations BMI (Body Mass Index)          84 98  F (36.7  C) (Temporal) 16 23.4 kg/m2         Blood Pressure from Last 3 Encounters:   10/03/17 155/81   10/02/17 156/77   09/25/17 120/64    Weight from Last 3 Encounters:   10/10/17 145 lb (65.8 kg)   10/03/17 145 lb (65.8 kg)   10/03/17 145 lb (65.8 kg)              We Performed the Following     *UA reflex to Microscopic and Culture ([facility] (except [place] and [place])     Urine Culture Aerobic Bacterial     Urine Microscopic     Wet prep          Today's Medication Changes          These changes are accurate as of: 10/10/17  4:50 PM.  If you have any questions, ask your nurse or doctor.               Start taking these medicines.        Dose/Directions    * sulfamethoxazole-trimethoprim 800-160 MG per tablet   Commonly known as:  BACTRIM DS/SEPTRA DS   Used for:  Acute cystitis without hematuria, Nonspecific finding on examination of urine, UTI symptoms   Started by:  Phani Tapia PA-C        Dose:   1 tablet   Take 1 tablet by mouth 2 times daily   Quantity:  20 tablet   Refills:  0       * sulfamethoxazole-trimethoprim 800-160 MG per tablet   Commonly known as:  BACTRIM DS/SEPTRA DS   Used for:  Acute cystitis without hematuria, Nonspecific finding on examination of urine, UTI symptoms   Started by:  Phani Tapia PA-C        Dose:  1 tablet   Take 1 tablet by mouth daily   Quantity:  90 tablet   Refills:  3       * Notice:  This list has 2 medication(s) that are the same as other medications prescribed for you. Read the directions carefully, and ask your doctor or other care provider to review them with you.         Where to get your medicines      These medications were sent to [facility] - [place] - [address]   [address] [place] [address]     Phone:  [phone]     sulfamethoxazole-trimethoprim 800-160 MG per tablet    sulfamethoxazole-trimethoprim 800-160 MG per tablet                Primary Care Provider Office Phone # Fax #    Phani Tapia PA-C [phone]       [address] [place] [address]        Equal Access to Services     [facility]: Hadii santa ku hadasho Soomaali, waaxda luqadaha, qaybta kaalmada adeegyajackson, wale allen . So [facility] [phone].    ATENCIÓN: Si habla español, tiene a ordoñez disposición servicios gratuitos de asistencia lingüística. [facility] [phone].    We comply with applicable federal civil rights laws and [place] laws. We do not discriminate on the basis of race, color, national origin, age, disability, sex, sexual orientation, or gender identity.            Thank you!     Thank you for choosing [facility]  for your care. Our goal is always to provide you with excellent care. Hearing back from our patients is one way we can continue to improve our services. Please take a few minutes to complete the written survey that you may receive in the mail after your visit with us.  Thank you!             Your Updated Medication List - Protect others around you: Learn how to safely use, store and throw away your medicines at www.disposemymeds.org.          This list is accurate as of: 10/10/17  4:50 PM.  Always use your most recent med list.                   Brand Name Dispense Instructions for use Diagnosis    atorvastatin 20 MG tablet    LIPITOR    90 tablet    Take 1 tablet (20 mg) by mouth daily    Hyperlipidemia LDL goal <130       hydrochlorothiazide 25 MG tablet    HYDRODIURIL    90 tablet    Take 1 tablet (25 mg) by mouth daily    Essential hypertension       LORazepam 0.5 MG tablet    ATIVAN    15 tablet    Take 1 tablet (0.5 mg) by mouth every 4 hours as needed (Anxiety, Nausea/Vomiting or Sleep)    Malignant neoplasm of upper-outer quadrant of left female breast (H), Encounter for antineoplastic chemotherapy       oxybutynin 5 MG tablet    DITROPAN    90 tablet    TAKE ONE-HALF TABLET BY MOUTH TWICE A DAY    Dysuria       oxyCODONE 5 MG IR tablet    ROXICODONE    20 tablet    Take 1 tablet (5 mg) by mouth every 6 hours as needed for pain        oxyCODONE-acetaminophen 5-325 MG per tablet    PERCOCET    20 tablet    Take 1 tablet by mouth every 4 hours as needed for pain    Post-op pain, S/P reverse total shoulder arthroplasty, right, S/P bilateral mastectomy, Trochanteric bursitis, unspecified laterality       priLOSEC 20 MG CR capsule   Generic drug:  omeprazole      Take by mouth daily        prochlorperazine 10 MG tablet    COMPAZINE    30 tablet    Take 1 tablet (10 mg) by mouth every 6 hours as needed (Nausea/Vomiting)    Encounter for antineoplastic chemotherapy       simvastatin 20 MG tablet    ZOCOR    30 tablet    TAKE ONE TABLET BY MOUTH AT BEDTIME    Hyperlipidemia with target LDL less than 130       * sulfamethoxazole-trimethoprim 800-160 MG per tablet    BACTRIM DS/SEPTRA DS    20 tablet    Take 1 tablet by mouth 2 times daily    Acute cystitis without hematuria,  Nonspecific finding on examination of urine, UTI symptoms       * sulfamethoxazole-trimethoprim 800-160 MG per tablet    BACTRIM DS/SEPTRA DS    90 tablet    Take 1 tablet by mouth daily    Acute cystitis without hematuria, Nonspecific finding on examination of urine, UTI symptoms       TYLENOL EX ST ARTHRITIS PAIN 500 MG tablet   Generic drug:  acetaminophen      Take 500-1,000 mg by mouth every 6 hours as needed for mild pain    Dermatitis due to sun       venlafaxine 37.5 MG 24 hr capsule    EFFEXOR XR    90 capsule    Take 1 capsule (37.5 mg) by mouth daily For one week then 2 daily for a week then 3 daily for 3-4 weeks.    Adjustment disorder with depressed mood       * Notice:  This list has 2 medication(s) that are the same as other medications prescribed for you. Read the directions carefully, and ask your doctor or other care provider to review them with you.

## 2017-10-10 NOTE — PROGRESS NOTES
SUBJECTIVE:                                                    Michaela Dorman is a 67 year old female who presents to clinic today for the following health issues:      HPI    URINARY TRACT SYMPTOMS  Onset: 10//09    Description:   Painful urination (Dysuria): YES  Blood in urine (Hematuria): no   Delay in urine (Hesitency): YES    Intensity: moderate    Progression of Symptoms:  worsening    Accompanying Signs & Symptoms:  Fever/chills: YES  Flank pain no   Nausea and vomiting: no   Any vaginal symptoms: none  Abdominal/Pelvic Pain: no     History:   History of frequent UTI's: YES  History of kidney stones: no   Sexually Active: YES  Possibility of pregnancy: No    Precipitating factors:   Nne    Therapies Tried and outcome: Pyridium       Problem list and histories reviewed & adjusted, as indicated.  Additional history: as documented    Patient Active Problem List   Diagnosis     Enthesopathy of hip region     Family history of stroke (cerebrovascular)     Trochanteric bursitis     Advanced directives, counseling/discussion     Health Care Home     Baker's cyst of knee     Patella-femoral syndrome     Adjustment disorder with depressed mood     Essential hypertension     Malignant neoplasm of upper-outer quadrant of left female breast (H)     Encounter for antineoplastic chemotherapy     S/P bilateral mastectomy     Anxiety     Hyperlipidemia LDL goal <130     S/P reverse total shoulder arthroplasty, right     Prophylactic antibiotic for dental procedure indicated due to prior joint replacement     Past Surgical History:   Procedure Laterality Date     ARTHROPLASTY SHOULDER Right 11/17/2015     ARTHROSCOPY KNEE  6/7/2012    Procedure:ARTHROSCOPY KNEE; right knee arthroscopy with partial lateral menisectomy; Surgeon:DAMIÁN MCALLISTER; Location:[facility] OR     C LIGATE FALLOPIAN TUBE       C NONSPECIFIC PROCEDURE  1956    ankle fracture surgery     EXCISE MASS AXILLA Left 9/16/2016    Procedure: EXCISE MASS AXILLA;   Surgeon: Keyon Blanco MD;  Location: [facility] OR     HC REMV CATARACT EXTRACAP,INSERT LENS  6/25/2009    Right eye     INSERT PORT VASCULAR ACCESS Right 10/7/2016    Procedure: INSERT PORT VASCULAR ACCESS;  Surgeon: Keyon Blanco MD;  Location: [facility] OR     MASTECTOMY SIMPLE BILATERAL Bilateral 2/8/2017    Procedure: MASTECTOMY SIMPLE BILATERAL;  Surgeon: Keyon Blanco MD;  Location: [facility] OR     OPEN REDUCTION INTERNAL FIXATION FOOT Right 3/20/2015    Procedure: OPEN REDUCTION INTERNAL FIXATION FOOT;  Surgeon: Javi Herrmann DPM;  Location: [facility] OR     SHOULDER SURGERY Right 11/2015       Social History   Substance Use Topics     Smoking status: Former Smoker     Packs/day: 3.00     Years: 10.00     Types: Cigarettes     Quit date: 12/1/1979     Smokeless tobacco: Never Used      Comment: approx. 3 packs daily     Alcohol use 0.0 oz/week     0 Standard drinks or equivalent per week      Comment: daily glass of wine     Family History   Problem Relation Age of Onset     Hypertension Mother      Thyroid Disease Mother      CEREBROVASCULAR DISEASE Mother      Hypertension Father      CEREBROVASCULAR DISEASE Father      CANCER Father      skin     Thyroid Disease Sister      DIABETES Brother      type 2     Depression Sister      Breast Cancer Sister      age 47     CANCER Sister      skin     CANCER Brother      inside nose             ROS:  Constitutional, HEENT, cardiovascular, pulmonary, gi and gu systems are negative, except as otherwise noted.      OBJECTIVE:   Pulse 84  Temp 98  F (36.7  C) (Temporal)  Resp 16  Wt 145 lb (65.8 kg)  BMI 23.4 kg/m2  Body mass index is 23.4 kg/(m^2).  GENERAL: healthy, alert and no distress  NECK: no adenopathy, no asymmetry, masses, or scars and thyroid normal to palpation  RESP: lungs clear to auscultation - no rales, rhonchi or wheezes  CV: regular rate and rhythm, normal S1 S2, no S3 or S4, no murmur, click or rub, no peripheral edema and peripheral pulses  strong  ABDOMEN: soft, nontender, no hepatosplenomegaly, no masses and bowel sounds normal  MS: no gross musculoskeletal defects noted, no edema    Diagnostic Test Results:  Results for orders placed or performed in visit on 10/10/17 (from the past 24 hour(s))   *UA reflex to Microscopic and Culture ([facility] (except [place] and [place])   Result Value Ref [facility]    Color Urine Yellow     Appearance Urine Clear     Glucose Urine Negative NEG^Negative mg/dL    Bilirubin Urine Negative NEG^Negative    Ketones Urine Negative NEG^Negative mg/dL    Specific Gravity Urine 1.015 1.003 - 1.035    Blood Urine Trace (A) NEG^Negative    pH Urine 6.5 5.0 - 7.0 pH    Protein Albumin Urine Negative NEG^Negative mg/dL    Urobilinogen Urine 0.2 0.2 - 1.0 EU/dL    Nitrite Urine Positive (A) NEG^Negative    Leukocyte Esterase Urine Trace (A) NEG^Negative    Source Unspecified Urine    Urine Microscopic   Result Value Ref [facility]    WBC Urine 2-5 (A) OTO2^O - 2 /HPF    RBC Urine O - 2 OTO2^O - 2 /HPF    Bacteria Urine Few (A) NEG^Negative /HPF   Wet prep   Result Value Ref [facility]    Specimen Description Vagina     Wet Prep No Trichomonas seen     Wet Prep No clue cells seen     Wet Prep No yeast seen        ASSESSMENT/PLAN:     1. UTI symptoms  2. Nonspecific finding on examination of urine  3. Acute cystitis without hematuria  Will begin suppressive therapy at this time.  Recheck urine in 2 weeks to assure cure.  - *UA reflex to Microscopic and Culture ([facility] (except [facility]); Future  - sulfamethoxazole-trimethoprim (BACTRIM DS/SEPTRA DS) 800-160 MG per tablet; Take 1 tablet by mouth 2 times daily  Dispense: 20 tablet; Refill: 0  - sulfamethoxazole-trimethoprim (BACTRIM DS/SEPTRA DS) 800-160 MG per tablet; Take 1 tablet by mouth daily  Dispense: 90 tablet; Refill: 3    Phani Jason PA-C  [facility]

## 2017-10-10 NOTE — NURSING NOTE
"Chief Complaint   Patient presents with     UTI       Initial Pulse 84  Temp 98  F (36.7  C) (Temporal)  Resp 16  Wt 145 lb (65.8 kg)  BMI 23.4 kg/m2 Estimated body mass index is 23.4 kg/(m^2) as calculated from the following:    Height as of 10/3/17: 5' 6\" (1.676 m).    Weight as of this encounter: 145 lb (65.8 kg).  Medication Reconciliation: complete   Rocio Velásquez CMA (AAMA)    "

## 2017-10-11 LAB
BACTERIA SPEC CULT: NORMAL
BACTERIA SPEC CULT: NORMAL
SPECIMEN SOURCE: NORMAL

## 2017-10-12 ENCOUNTER — MYC MEDICAL ADVICE (OUTPATIENT)
Dept: FAMILY MEDICINE | Facility: OTHER | Age: 67
End: 2017-10-12

## 2017-10-18 ENCOUNTER — MYC MEDICAL ADVICE (OUTPATIENT)
Dept: FAMILY MEDICINE | Facility: OTHER | Age: 67
End: 2017-10-18

## 2017-10-18 DIAGNOSIS — N39.0 CHRONIC LOWER URINARY TRACT INFECTION: Primary | ICD-10-CM

## 2017-10-18 RX ORDER — CIPROFLOXACIN 250 MG/1
250 TABLET, FILM COATED ORAL DAILY
Qty: 90 TABLET | Refills: 1 | Status: SHIPPED | OUTPATIENT
Start: 2017-10-18 | End: 2018-01-16

## 2017-10-24 ENCOUNTER — ONCOLOGY VISIT (OUTPATIENT)
Dept: ONCOLOGY | Facility: CLINIC | Age: 67
End: 2017-10-24
Payer: COMMERCIAL

## 2017-10-24 ENCOUNTER — INFUSION THERAPY VISIT (OUTPATIENT)
Dept: INFUSION THERAPY | Facility: CLINIC | Age: 67
End: 2017-10-24
Attending: INTERNAL MEDICINE
Payer: MEDICARE

## 2017-10-24 VITALS
TEMPERATURE: 98.1 F | HEIGHT: 66 IN | BODY MASS INDEX: 23.18 KG/M2 | HEART RATE: 97 BPM | RESPIRATION RATE: 16 BRPM | WEIGHT: 144.2 LBS | OXYGEN SATURATION: 96 %

## 2017-10-24 VITALS — HEART RATE: 79 BPM | DIASTOLIC BLOOD PRESSURE: 66 MMHG | SYSTOLIC BLOOD PRESSURE: 128 MMHG

## 2017-10-24 DIAGNOSIS — E78.5 HYPERLIPIDEMIA LDL GOAL <130: ICD-10-CM

## 2017-10-24 DIAGNOSIS — I10 ESSENTIAL HYPERTENSION: ICD-10-CM

## 2017-10-24 DIAGNOSIS — F43.21 ADJUSTMENT DISORDER WITH DEPRESSED MOOD: ICD-10-CM

## 2017-10-24 DIAGNOSIS — Z51.11 ENCOUNTER FOR ANTINEOPLASTIC CHEMOTHERAPY: Primary | ICD-10-CM

## 2017-10-24 DIAGNOSIS — C50.412 MALIGNANT NEOPLASM OF UPPER-OUTER QUADRANT OF LEFT BREAST IN FEMALE, ESTROGEN RECEPTOR NEGATIVE (H): Primary | ICD-10-CM

## 2017-10-24 DIAGNOSIS — C50.412 MALIGNANT NEOPLASM OF UPPER-OUTER QUADRANT OF LEFT FEMALE BREAST, UNSPECIFIED ESTROGEN RECEPTOR STATUS (H): ICD-10-CM

## 2017-10-24 DIAGNOSIS — Z17.1 MALIGNANT NEOPLASM OF UPPER-OUTER QUADRANT OF LEFT BREAST IN FEMALE, ESTROGEN RECEPTOR NEGATIVE (H): Primary | ICD-10-CM

## 2017-10-24 LAB
ALBUMIN SERPL-MCNC: 4.4 G/DL (ref 3.4–5)
ALP SERPL-CCNC: 59 U/L (ref 40–150)
ALT SERPL W P-5'-P-CCNC: 33 U/L (ref 0–50)
ANION GAP SERPL CALCULATED.3IONS-SCNC: 10 MMOL/L (ref 3–14)
AST SERPL W P-5'-P-CCNC: 28 U/L (ref 0–45)
BILIRUB SERPL-MCNC: 0.3 MG/DL (ref 0.2–1.3)
BUN SERPL-MCNC: 28 MG/DL (ref 7–30)
CALCIUM SERPL-MCNC: 9.6 MG/DL (ref 8.5–10.1)
CHLORIDE SERPL-SCNC: 102 MMOL/L (ref 94–109)
CO2 SERPL-SCNC: 27 MMOL/L (ref 20–32)
CREAT SERPL-MCNC: 0.97 MG/DL (ref 0.52–1.04)
ERYTHROCYTE [DISTWIDTH] IN BLOOD BY AUTOMATED COUNT: 14.7 % (ref 10–15)
GFR SERPL CREATININE-BSD FRML MDRD: 57 ML/MIN/1.7M2
GLUCOSE SERPL-MCNC: 90 MG/DL (ref 70–99)
HCT VFR BLD AUTO: 37.3 % (ref 35–47)
HGB BLD-MCNC: 11.7 G/DL (ref 11.7–15.7)
MCH RBC QN AUTO: 32 PG (ref 26.5–33)
MCHC RBC AUTO-ENTMCNC: 31.4 G/DL (ref 31.5–36.5)
MCV RBC AUTO: 102 FL (ref 78–100)
PLATELET # BLD AUTO: 294 10E9/L (ref 150–450)
POTASSIUM SERPL-SCNC: 3.9 MMOL/L (ref 3.4–5.3)
PROT SERPL-MCNC: 7.5 G/DL (ref 6.8–8.8)
RBC # BLD AUTO: 3.66 10E12/L (ref 3.8–5.2)
SODIUM SERPL-SCNC: 139 MMOL/L (ref 133–144)
WBC # BLD AUTO: 5.6 10E9/L (ref 4–11)

## 2017-10-24 PROCEDURE — 25000128 H RX IP 250 OP 636: Performed by: INTERNAL MEDICINE

## 2017-10-24 PROCEDURE — 96413 CHEMO IV INFUSION 1 HR: CPT

## 2017-10-24 PROCEDURE — 36415 COLL VENOUS BLD VENIPUNCTURE: CPT | Performed by: INTERNAL MEDICINE

## 2017-10-24 PROCEDURE — 85027 COMPLETE CBC AUTOMATED: CPT | Performed by: INTERNAL MEDICINE

## 2017-10-24 PROCEDURE — 80053 COMPREHEN METABOLIC PANEL: CPT | Performed by: INTERNAL MEDICINE

## 2017-10-24 PROCEDURE — 99214 OFFICE O/P EST MOD 30 MIN: CPT | Performed by: INTERNAL MEDICINE

## 2017-10-24 RX ORDER — HEPARIN SODIUM (PORCINE) LOCK FLUSH IV SOLN 100 UNIT/ML 100 UNIT/ML
500 SOLUTION INTRAVENOUS EVERY 8 HOURS
Status: DISCONTINUED | OUTPATIENT
Start: 2017-10-24 | End: 2017-10-24 | Stop reason: HOSPADM

## 2017-10-24 RX ORDER — VENLAFAXINE HYDROCHLORIDE 75 MG/1
75 CAPSULE, EXTENDED RELEASE ORAL DAILY
COMMUNITY
End: 2017-12-27

## 2017-10-24 RX ORDER — HEPARIN SODIUM (PORCINE) LOCK FLUSH IV SOLN 100 UNIT/ML 100 UNIT/ML
500 SOLUTION INTRAVENOUS EVERY 8 HOURS
Status: CANCELLED
Start: 2017-10-24

## 2017-10-24 RX ADMIN — TRASTUZUMAB 380 MG: 150 INJECTION, POWDER, LYOPHILIZED, FOR SOLUTION INTRAVENOUS at 11:26

## 2017-10-24 RX ADMIN — SODIUM CHLORIDE 250 ML: 9 INJECTION, SOLUTION INTRAVENOUS at 11:12

## 2017-10-24 RX ADMIN — SODIUM CHLORIDE, PRESERVATIVE FREE 500 UNITS: 5 INJECTION INTRAVENOUS at 12:08

## 2017-10-24 ASSESSMENT — PAIN SCALES - GENERAL: PAINLEVEL: NO PAIN (0)

## 2017-10-24 NOTE — MR AVS SNAPSHOT
After Visit Summary   10/24/2017    Michaela Dorman    MRN: [id number]           Patient Information     Date Of Birth          1950        Visit Information        Provider Department      10/24/2017 11:00 AM NL INFUSION CHAIR 3 [facility]        Today's Diagnoses     Encounter for antineoplastic chemotherapy    -  1    Malignant neoplasm of upper-outer quadrant of left female breast, unspecified estrogen receptor status (H)          Care Instructions    Pt to return on 11/14 for Herceptin. Copies of medication list and upcoming appointments given prior to discharge.           Follow-ups after your visit        Follow-up notes from your care team     Return in 3 weeks (on 11/14/2017).      Your next 10 appointments already scheduled     Nov 14, 2017 11:00 AM CST   Level 2 with NL INFUSION CHAIR 2   [facility] ([facility])    [address] [place] [address] [place] [address]   [phone]            Dec 05, 2017 11:00 AM CST   Return Visit with Syeda Ferguson MD   [facility] ([facility])    [address] [place] [address]   [phone]            Dec 05, 2017 11:30 AM CST   Level 2 with NL INFUSION CHAIR 1   [facility] ([facility])    [address] [place] [address] [place] [address]   [phone]            Dec 19, 2017 12:30 PM CST   Return Visit with Loren Felipe MD   [facility]    [address] [place] [address] [phone]              Who to contact     If you have questions or need follow up information about today's clinic visit or your schedule please contact [facility] directly at [phone].  Normal or non-critical lab and imaging results will be communicated to you by MyChart, letter or phone within 4 business days after the clinic has received  the results. If you do not hear from us within 7 days, please contact the clinic through [employer] or phone. If you have a critical or abnormal lab result, we will notify you by phone as soon as possible.  Submit refill requests through [employer] or call your pharmacy and they will forward the refill request to us. Please allow 3 business days for your refill to be completed.          Additional Information About Your Visit        [employer] Information     [employer] gives you secure access to your electronic health record. If you see a primary care provider, you can also send messages to your care team and make appointments. If you have questions, please call your primary care clinic.  If you do not have a primary care provider, please call [phone] and they will assist you.        Care EveryWhere ID     This is your Care EveryWhere ID. This could be used by other organizations to access your [facility] medical records  [id number]        Your Vitals Were     Pulse                   79            Blood Pressure from Last 3 Encounters:   10/24/17 128/66   10/03/17 155/81   10/02/17 156/77    Weight from Last 3 Encounters:   10/24/17 65.4 kg (144 lb 3.2 oz)   10/10/17 65.8 kg (145 lb)   10/03/17 65.8 kg (145 lb)              Today, you had the following     No orders found for display         Today's Medication Changes          These changes are accurate as of: 10/24/17  1:36 PM.  If you have any questions, ask your nurse or doctor.               Stop taking these medicines if you haven't already. Please contact your care team if you have questions.     simvastatin 20 MG tablet   Commonly known as:  ZOCOR   Stopped by:  Syeda Ferguson MD                    Primary Care Provider Office Phone # Fax #    Phani Tapia PA-C [phone]       [address] [facility] [address]        Equal Access to Services     SHIVANI ARTIS : preethi Mcfadden, wale mcconnell  maggy carrveronica laAshutoshwarren ah. So [facility] [phone].    ATENCIÓN: Si jose manuella noel, tiene a ordoñez disposición servicios gratuitos de asistencia lingüística. Shaneka brandt [phone].    We comply with applicable federal civil rights laws and [place] laws. We do not discriminate on the basis of race, color, national origin, age, disability, sex, sexual orientation, or gender identity.            Thank you!     Thank you for choosing [facility]  for your care. Our goal is always to provide you with excellent care. Hearing back from our patients is one way we can continue to improve our services. Please take a few minutes to complete the written survey that you may receive in the mail after your visit with us. Thank you!             Your Updated Medication List - Protect others around you: Learn how to safely use, store and throw away your medicines at www.disposemymeds.org.          This list is accurate as of: 10/24/17  1:36 PM.  Always use your most recent med list.                   Brand Name Dispense Instructions for use Diagnosis    atorvastatin 20 MG tablet    LIPITOR    90 tablet    Take 1 tablet (20 mg) by mouth daily    Hyperlipidemia LDL goal <130       ciprofloxacin 250 MG tablet    CIPRO    90 tablet    Take 1 tablet (250 mg) by mouth daily    Chronic lower urinary tract infection       hydrochlorothiazide 25 MG tablet    HYDRODIURIL    90 tablet    Take 1 tablet (25 mg) by mouth daily    Essential hypertension       LORazepam 0.5 MG tablet    ATIVAN    15 tablet    Take 1 tablet (0.5 mg) by mouth every 4 hours as needed (Anxiety, Nausea/Vomiting or Sleep)    Malignant neoplasm of upper-outer quadrant of left female breast (H), Encounter for antineoplastic chemotherapy       oxybutynin 5 MG tablet    DITROPAN    90 tablet    TAKE ONE-HALF TABLET BY MOUTH TWICE A DAY    Dysuria       oxyCODONE 5 MG IR tablet    ROXICODONE    20 tablet    Take 1 tablet (5 mg) by mouth every 6 hours as  needed for pain        oxyCODONE-acetaminophen 5-325 MG per tablet    PERCOCET    20 tablet    Take 1 tablet by mouth every 4 hours as needed for pain    Post-op pain, S/P reverse total shoulder arthroplasty, right, S/P bilateral mastectomy, Trochanteric bursitis, unspecified laterality       priLOSEC 20 MG CR capsule   Generic drug:  omeprazole      Take by mouth daily        prochlorperazine 10 MG tablet    COMPAZINE    30 tablet    Take 1 tablet (10 mg) by mouth every 6 hours as needed (Nausea/Vomiting)    Encounter for antineoplastic chemotherapy       TYLENOL EX ST ARTHRITIS PAIN 500 MG tablet   Generic drug:  acetaminophen      Take 500-1,000 mg by mouth every 6 hours as needed for mild pain    Dermatitis due to sun       * venlafaxine 75 MG 24 hr capsule    EFFEXOR-XR     Take 75 mg by mouth daily        * venlafaxine 37.5 MG 24 hr capsule    EFFEXOR XR    90 capsule    Take 1 capsule (37.5 mg) by mouth daily For one week then 2 daily for a week then 3 daily for 3-4 weeks.    Adjustment disorder with depressed mood       * Notice:  This list has 2 medication(s) that are the same as other medications prescribed for you. Read the directions carefully, and ask your doctor or other care provider to review them with you.

## 2017-10-24 NOTE — PATIENT INSTRUCTIONS
Pt to return on 11/14 for Herceptin. Copies of medication list and upcoming appointments given prior to discharge.

## 2017-10-24 NOTE — NURSING NOTE
"Oncology Rooming Note    October 24, 2017 10:37 AM   Michaela Dorman is a 67 year old female who presents for:    Chief Complaint   Patient presents with     Oncology Clinic Visit     2 month follow up for Malignant neoplasm of upper-outer quadrant of left breast in female, estrogen receptor negative      Chemotherapy     C13D1 today with labs prior     Initial Vitals: There were no vitals taken for this visit. Estimated body mass index is 23.4 kg/(m^2) as calculated from the following:    Height as of 10/3/17: 5' 6\" (1.676 m).    Weight as of 10/10/17: 145 lb (65.8 kg). There is no height or weight on file to calculate BSA.  Data Unavailable Comment: Data Unavailable   No LMP recorded. Patient is postmenopausal.  Allergies reviewed: Yes  Medications reviewed: Yes    Medications: Medication refills not needed today.  Pharmacy name entered into EPIC:    [facility] [address] [place] [address] - [address]  [facility] [place], [place] - [address] [facility] [place], [place] - [address] [facility]     Clinical concerns:none     10 minutes for nursing intake (face to face time)     Keren Mccain              "

## 2017-10-24 NOTE — ASSESSMENT & PLAN NOTE
Michaela Dorman  initially felt a mass in her left axilla. She then underwent mammogram which showed dense breasts with no suspicious lesions in 4/2016. She was then seen by surgery and underwent excisional LN biopsy that showed metastatic high-grade poorly differentiated carcinoma with a tumor size of 2.5 cm. Immunohistochemistry was done for ER/MO receptors that came back both negative. HER-2/boubacar was amplified by FISH. She then underwent MRI of the breast on 9/26/2016 that showed suspicious abnormality with multicentric left sided breast cancer that involved the left lateral breast form the nipple to the chest wall. A PET scan was obtained on 10/5/2016 that showed a hypermetabolic opacity in the left axilla wihtout other pathological activity. It was then recommended that she undergo neoadjuvant chemotherapy with Cytoxan and Doxorubicin that will be followed by Taxol, Herceptin. Patient initiated treatment on 10/11/2016.

## 2017-10-24 NOTE — NURSING NOTE
DISCHARGE PLAN:  Next appointments: See patient instruction section  Departure Mode: Ambulatory  Accompanied by: Unknown  0 minutes for nursing discharge (face to face time)     Michaela KENYON aDndy is here today for Oncology.  Patient was not seen by writing nurse at time of appointment. Patient to infusion.  They will schedule follow up with treatment on 12/5/17.  See patient instructions and Oncologist's Progress note for further details. Questions and concerns addressed to patient's satisfaction. Patient verbalized and demonstrated understanding of plan.  Contact information provided and patient is encouraged to call with any that arise in the interim of care.    Juan José Hernandez, RN, BSN, OCN   Oncology Care Coordinator RN  [facility]  [phone]  10/24/2017, 11:05 AM

## 2017-10-24 NOTE — MR AVS SNAPSHOT
After Visit Summary   10/24/2017    Michaela Dorman    MRN: [id number]           Patient Information     Date Of Birth          1950        Visit Information        Provider Department      10/24/2017 10:30 AM Syeda Ferguson MD [facility]        Today's Diagnoses     Adjustment disorder with depressed mood    -  1    Malignant neoplasm of upper-outer quadrant of left breast in female, estrogen receptor negative (H)        Hyperlipidemia LDL goal <130          Care Instructions      Please follow up with Dr. Ferguson beginning of December.      Follow Up Date/Time: 12/5/17    If you have any questions or concerns please feel free to call.    If you need to reschedule please call:  Clinic or Lab Appointment - [phone]  Infusion - [phone]  Imaging - [phone]    Juan José Hernandez RN, BSN, OCN   Oncology Care Coordinator RN  [facility]  [phone]              Follow-ups after your visit        Follow-up notes from your care team     Return in about 6 weeks (around 12/6/2017) for Schedule for chemotherapy as per treatment plan.      Your next 10 appointments already scheduled     Nov 14, 2017 11:00 AM CST   Level 2 with NL INFUSION CHAIR 2   [facility] ([facility])    [address] [place] [address] [place] [address] [phone]            Dec 19, 2017 12:30 PM CST   Return Visit with Loren Felipe MD   [facility]    [address] [place] [id number] [phone]              Who to contact     If you have questions or need follow up information about today's clinic visit or your schedule please contact [facility] directly at [phone].  Normal or non-critical lab and imaging results will be communicated to you by MyChart, letter or phone within 4 business days after the clinic has received the results. If you do not hear from us within 7  "days, please contact the clinic through [employer] or phone. If you have a critical or abnormal lab result, we will notify you by phone as soon as possible.  Submit refill requests through [employer] or call your pharmacy and they will forward the refill request to us. Please allow 3 business days for your refill to be completed.          Additional Information About Your Visit        [facility] Information     [employer] gives you secure access to your electronic health record. If you see a primary care provider, you can also send messages to your care team and make appointments. If you have questions, please call your primary care clinic.  If you do not have a primary care provider, please call [phone] and they will assist you.        Care EveryWhere ID     This is your Care EveryWhere ID. This could be used by other organizations to access your [facility] medical records  [id number]        Your Vitals Were     Pulse Temperature Respirations Height Pulse Oximetry BMI (Body Mass Index)    97 98.1  F (36.7  C) (Temporal) 16 1.676 m (5' 6\") 96% 23.27 kg/m2       Blood Pressure from Last 3 Encounters:   10/03/17 155/81   10/02/17 156/77   09/25/17 120/64    Weight from Last 3 Encounters:   10/24/17 65.4 kg (144 lb 3.2 oz)   10/10/17 65.8 kg (145 lb)   10/03/17 65.8 kg (145 lb)              We Performed the Following     CBC with platelets     Comprehensive metabolic panel          Today's Medication Changes          These changes are accurate as of: 10/24/17 11:04 AM.  If you have any questions, ask your nurse or doctor.               Stop taking these medicines if you haven't already. Please contact your care team if you have questions.     simvastatin 20 MG tablet   Commonly known as:  ZOCOR   Stopped by:  Syeda Ferguson MD                    Primary Care Provider Office Phone # Fax #    Phani Tapia PA-C [phone]       [address] [facility] [address]        Equal Access to Services     SHIVANI ARTIS " AH: Amy le Rainerali, walesleyda luqadaha, qaybta kanatalio darianpeloncastrozeenat milena alesusi carradairethan pena. So [facility] [phone].    ATENCIÓN: Si habla noel, tiene a ordoñez disposición servicios gratuitos de asistencia lingüística. Llame al [phone].    We comply with applicable federal civil rights laws and [place] laws. We do not discriminate on the basis of race, color, national origin, age, disability, sex, sexual orientation, or gender identity.            Thank you!     Thank you for choosing [facility]  for your care. Our goal is always to provide you with excellent care. Hearing back from our patients is one way we can continue to improve our services. Please take a few minutes to complete the written survey that you may receive in the mail after your visit with us. Thank you!             Your Updated Medication List - Protect others around you: Learn how to safely use, store and throw away your medicines at www.disposemymeds.org.          This list is accurate as of: 10/24/17 11:04 AM.  Always use your most recent med list.                   Brand Name Dispense Instructions for use Diagnosis    atorvastatin 20 MG tablet    LIPITOR    90 tablet    Take 1 tablet (20 mg) by mouth daily    Hyperlipidemia LDL goal <130       ciprofloxacin 250 MG tablet    CIPRO    90 tablet    Take 1 tablet (250 mg) by mouth daily    Chronic lower urinary tract infection       hydrochlorothiazide 25 MG tablet    HYDRODIURIL    90 tablet    Take 1 tablet (25 mg) by mouth daily    Essential hypertension       LORazepam 0.5 MG tablet    ATIVAN    15 tablet    Take 1 tablet (0.5 mg) by mouth every 4 hours as needed (Anxiety, Nausea/Vomiting or Sleep)    Malignant neoplasm of upper-outer quadrant of left female breast (H), Encounter for antineoplastic chemotherapy       oxybutynin 5 MG tablet    DITROPAN    90 tablet    TAKE ONE-HALF TABLET BY MOUTH TWICE A DAY    Dysuria       oxyCODONE 5 MG IR  tablet    ROXICODONE    20 tablet    Take 1 tablet (5 mg) by mouth every 6 hours as needed for pain        oxyCODONE-acetaminophen 5-325 MG per tablet    PERCOCET    20 tablet    Take 1 tablet by mouth every 4 hours as needed for pain    Post-op pain, S/P reverse total shoulder arthroplasty, right, S/P bilateral mastectomy, Trochanteric bursitis, unspecified laterality       priLOSEC 20 MG CR capsule   Generic drug:  omeprazole      Take by mouth daily        prochlorperazine 10 MG tablet    COMPAZINE    30 tablet    Take 1 tablet (10 mg) by mouth every 6 hours as needed (Nausea/Vomiting)    Encounter for antineoplastic chemotherapy       TYLENOL EX ST ARTHRITIS PAIN 500 MG tablet   Generic drug:  acetaminophen      Take 500-1,000 mg by mouth every 6 hours as needed for mild pain    Dermatitis due to sun       * venlafaxine 75 MG 24 hr capsule    EFFEXOR-XR     Take 75 mg by mouth daily        * venlafaxine 37.5 MG 24 hr capsule    EFFEXOR XR    90 capsule    Take 1 capsule (37.5 mg) by mouth daily For one week then 2 daily for a week then 3 daily for 3-4 weeks.    Adjustment disorder with depressed mood       * Notice:  This list has 2 medication(s) that are the same as other medications prescribed for you. Read the directions carefully, and ask your doctor or other care provider to review them with you.

## 2017-10-24 NOTE — PROGRESS NOTES
Hematology/ Oncology Follow-up Visit:  Oct 24, 2017    Reason for Visit:   Chief Complaint   Patient presents with     Oncology Clinic Visit     2 month follow up for Malignant neoplasm of upper-outer quadrant of left breast in female, estrogen receptor negative      Chemotherapy     C13D1 today with labs prior       Oncologic History:  Malignant neoplasm of upper-outer quadrant of left female breast (H)  Michaela Dorman  initially felt a mass in her left axilla. She then underwent mammogram which showed dense breasts with no suspicious lesions in 4/2016. She was then seen by surgery and underwent excisional LN biopsy that showed metastatic high-grade poorly differentiated carcinoma with a tumor size of 2.5 cm. Immunohistochemistry was done for ER/NM receptors that came back both negative. HER-2/boubacar was amplified by FISH. She then underwent MRI of the breast on 9/26/2016 that showed suspicious abnormality with multicentric left sided breast cancer that involved the left lateral breast form the nipple to the chest wall. A PET scan was obtained on 10/5/2016 that showed a hypermetabolic opacity in the left axilla wihtout other pathological activity. It was then recommended that she undergo neoadjuvant chemotherapy with Cytoxan and Doxorubicin that will be followed by Taxol, Herceptin. Patient initiated treatment on 10/11/2016.         Interval History:  Patient is here today for follow-up. She has been doing well without any recent complaints. She denies any nausea vomiting or diarrhea. She denies any fever or chills. She is currently on adjuvant Herceptin therapy. She has been tolerating treatment without significant side effects.    Review Of Systems:  Constitutional: Negative for fever, chills, and night sweats.  Skin: negative.  Eyes: negative.  Ears/Nose/Throat: negative.  Respiratory: No shortness of breath, dyspnea on exertion, cough, or hemoptysis.  Cardiovascular: negative.  Gastrointestinal:  negative.  Genitourinary: negative.  Musculoskeletal: negative.  Neurologic: negative.  Psychiatric: negative.  Hematologic/Lymphatic/Immunologic: negative.  Endocrine: negative.    All other ROS negative unless mentioned in interval history.    Past medical, social, surgical, and family histories reviewed.    Allergies:  Allergies as of 10/24/2017 - Oskar as Reviewed 10/24/2017   Allergen Reaction Noted     No known drug allergies  07/05/2002       Current Medications:  Current Outpatient Prescriptions   Medication Sig Dispense Refill     venlafaxine (EFFEXOR-XR) 75 MG 24 hr capsule Take 75 mg by mouth daily       ciprofloxacin (CIPRO) 250 MG tablet Take 1 tablet (250 mg) by mouth daily 90 tablet 1     oxyCODONE (ROXICODONE) 5 MG IR tablet Take 1 tablet (5 mg) by mouth every 6 hours as needed for pain 20 tablet 0     venlafaxine (EFFEXOR XR) 37.5 MG 24 hr capsule Take 1 capsule (37.5 mg) by mouth daily For one week then 2 daily for a week then 3 daily for 3-4 weeks. 90 capsule 1     oxyCODONE-acetaminophen (PERCOCET) 5-325 MG per tablet Take 1 tablet by mouth every 4 hours as needed for pain 20 tablet 0     atorvastatin (LIPITOR) 20 MG tablet Take 1 tablet (20 mg) by mouth daily 90 tablet 1     prochlorperazine (COMPAZINE) 10 MG tablet Take 1 tablet (10 mg) by mouth every 6 hours as needed (Nausea/Vomiting) 30 tablet 3     oxybutynin (DITROPAN) 5 MG tablet TAKE ONE-HALF TABLET BY MOUTH TWICE A DAY 90 tablet 0     hydrochlorothiazide (HYDRODIURIL) 25 MG tablet Take 1 tablet (25 mg) by mouth daily 90 tablet 1     LORazepam (ATIVAN) 0.5 MG tablet Take 1 tablet (0.5 mg) by mouth every 4 hours as needed (Anxiety, Nausea/Vomiting or Sleep) 15 tablet 0     acetaminophen (TYLENOL EX ST ARTHRITIS PAIN) 500 MG tablet Take 500-1,000 mg by mouth every 6 hours as needed for mild pain       omeprazole (PRILOSEC) 20 MG capsule Take by mouth daily          Physical Exam:  Pulse 97  Temp 98.1  F (36.7  C) (Temporal)  Resp 16  Ht  "1.676 m (5' 6\")  Wt 65.4 kg (144 lb 3.2 oz)  SpO2 96%  BMI 23.27 kg/m2  Wt Readings from Last 12 Encounters:   10/24/17 65.4 kg (144 lb 3.2 oz)   10/10/17 65.8 kg (145 lb)   10/03/17 65.8 kg (145 lb)   10/03/17 65.8 kg (145 lb)   10/02/17 63.5 kg (140 lb)   09/25/17 65.5 kg (144 lb 8 oz)   09/12/17 64.9 kg (143 lb)   08/23/17 64.7 kg (142 lb 11.2 oz)   08/23/17 64.7 kg (142 lb 11.2 oz)   07/11/17 64.6 kg (142 lb 8 oz)   06/20/17 64.4 kg (142 lb)   05/30/17 64.5 kg (142 lb 1.6 oz)     ECOG performance status: 0  GENERAL APPEARANCE: Healthy, alert and in no acute distress.  HEENT: Sclerae anicteric. PERRLA. Oropharynx without ulcers, lesions, or thrush.  NECK: Supple. No asymmetry or masses.  LYMPHATICS: No palpable cervical, supraclavicular, axillary, or inguinal lymphadenopathy.  RESP: Lungs clear to auscultation bilaterally without rales, rhonchi or wheezes.  BREAST: Scars of bilateral mastectomies bilaterally \" \"CARDIOVASCULAR: Regular rate and rhythm. Normal S1, S2; no S3 or S4. No murmur, gallop, or rub.  ABDOMEN: Soft, nontender. Bowel sounds normal. No palpable organomegaly or masses.  MUSCULOSKELETAL: Extremities without gross deformities noted. No edema of bilateral lower extremities.  SKIN: No suspicious lesions or rashes.  NEURO: Alert and oriented x 3. Cranial nerves II-XII grossly intact.  PSYCHIATRIC: Mentation and affect appear normal.    Laboratory/Imaging Studies:  No visits with results within 2 Week(s) from this visit.  Latest known visit with results is:    Office Visit on 10/10/2017   Component Date Value Ref Range Status     Color Urine 10/10/2017 Yellow   Final     Appearance Urine 10/10/2017 Clear   Final     Glucose Urine 10/10/2017 Negative  NEG^Negative mg/dL Final     Bilirubin Urine 10/10/2017 Negative  NEG^Negative Final     Ketones Urine 10/10/2017 Negative  NEG^Negative mg/dL Final     Specific Gravity Urine 10/10/2017 1.015  1.003 - 1.035 Final     Blood Urine 10/10/2017 Trace* " NEG^Negative Final     pH Urine 10/10/2017 6.5  5.0 - 7.0 pH Final     Protein Albumin Urine 10/10/2017 Negative  NEG^Negative mg/dL Final     Urobilinogen Urine 10/10/2017 0.2  0.2 - 1.0 EU/dL Final     Nitrite Urine 10/10/2017 Positive* NEG^Negative Final     Leukocyte Esterase Urine 10/10/2017 Trace* NEG^Negative Final     Source 10/10/2017 Unspecified Urine   Final     Specimen Description 10/10/2017 Vagina   Final     Wet Prep 10/10/2017 No Trichomonas seen   Final     Wet Prep 10/10/2017 No clue cells seen   Final     Wet Prep 10/10/2017 No yeast seen   Final     Specimen Description 10/10/2017 Unspecified Urine   Final     Culture Micro 10/10/2017    Final                    Value:<10,000 colonies/mL  urogenital cher       Culture Micro 10/10/2017 Susceptibility testing not routinely done   Final     WBC Urine 10/10/2017 2-5* OTO2^O - 2 /HPF Final     RBC Urine 10/10/2017 O - 2  OTO2^O - 2 /HPF Final     Bacteria Urine 10/10/2017 Few* NEG^Negative /HPF Final        Recent Results (from the past 744 hour(s))   Radius/Ulna XR, PA & LAT, right    Narrative    XR FOREARM RT 2 VW 10/2/2017 5:09 PM     HISTORY: pain      Impression    IMPRESSION: Negative exam.    DERICK BEE MD       Assessment and plan:    (C50.412,  Z17.1) Malignant neoplasm of upper-outer quadrant of left breast in female, estrogen receptor negative (H)  Patient will continue on adjuvant Herceptin therapy. She is due to concluded therapy in middle of December 2017. We will continue to monitor ejection fraction. I will see the patient again in 2 months time or sooner if there are new developments or concerns.    (E78.5) Hyperlipidemia LDL goal <130  She is currently on Lipitor 20 mg orally daily.    (F43.21) Adjustment disorder with depressed mood  (primary encounter diagnosis)  She is currently on Effexor XR 37.5 milligrams orally daily.    The patient is ready to learn, no apparent learning barriers were identified.  Diagnosis and  treatment plans were explained to the patient. The patient expressed understanding of the content. The patient asked appropriate questions. The patient questions were answered to her satisfaction.    Chart documentation with Dragon Voice recognition Software. Although reviewed after completion, some words and grammatical errors may remain.

## 2017-10-24 NOTE — PATIENT INSTRUCTIONS
Please follow up with Dr. Ferguson beginning of December.      Follow Up Date/Time: 12/5/17    If you have any questions or concerns please feel free to call.    If you need to reschedule please call:  Clinic or Lab Appointment - [phone]  Infusion - [phone]  Imaging - [phone]    Juan José Hernandez, RN, BSN, OCN   Oncology Care Coordinator RN  [facility]  [phone]

## 2017-10-28 ENCOUNTER — MYC MEDICAL ADVICE (OUTPATIENT)
Dept: FAMILY MEDICINE | Facility: OTHER | Age: 67
End: 2017-10-28

## 2017-10-30 ENCOUNTER — MYC MEDICAL ADVICE (OUTPATIENT)
Dept: FAMILY MEDICINE | Facility: OTHER | Age: 67
End: 2017-10-30

## 2017-11-05 ENCOUNTER — MYC MEDICAL ADVICE (OUTPATIENT)
Dept: FAMILY MEDICINE | Facility: OTHER | Age: 67
End: 2017-11-05

## 2017-11-10 DIAGNOSIS — R30.0 DYSURIA: ICD-10-CM

## 2017-11-14 ENCOUNTER — INFUSION THERAPY VISIT (OUTPATIENT)
Dept: INFUSION THERAPY | Facility: CLINIC | Age: 67
End: 2017-11-14
Attending: INTERNAL MEDICINE
Payer: MEDICARE

## 2017-11-14 VITALS
TEMPERATURE: 98.4 F | DIASTOLIC BLOOD PRESSURE: 77 MMHG | OXYGEN SATURATION: 96 % | SYSTOLIC BLOOD PRESSURE: 140 MMHG | RESPIRATION RATE: 18 BRPM | WEIGHT: 139.4 LBS | HEART RATE: 75 BPM | BODY MASS INDEX: 22.5 KG/M2

## 2017-11-14 DIAGNOSIS — C50.412 MALIGNANT NEOPLASM OF UPPER-OUTER QUADRANT OF LEFT FEMALE BREAST, UNSPECIFIED ESTROGEN RECEPTOR STATUS (H): ICD-10-CM

## 2017-11-14 DIAGNOSIS — C50.412 MALIGNANT NEOPLASM OF UPPER-OUTER QUADRANT OF LEFT BREAST IN FEMALE, ESTROGEN RECEPTOR NEGATIVE (H): ICD-10-CM

## 2017-11-14 DIAGNOSIS — Z51.11 ENCOUNTER FOR ANTINEOPLASTIC CHEMOTHERAPY: Primary | ICD-10-CM

## 2017-11-14 DIAGNOSIS — Z17.1 MALIGNANT NEOPLASM OF UPPER-OUTER QUADRANT OF LEFT BREAST IN FEMALE, ESTROGEN RECEPTOR NEGATIVE (H): ICD-10-CM

## 2017-11-14 PROCEDURE — 25000128 H RX IP 250 OP 636: Performed by: INTERNAL MEDICINE

## 2017-11-14 PROCEDURE — 96413 CHEMO IV INFUSION 1 HR: CPT

## 2017-11-14 RX ORDER — LORAZEPAM 0.5 MG/1
.5-1 TABLET ORAL EVERY 6 HOURS PRN
Status: CANCELLED
Start: 2017-11-15

## 2017-11-14 RX ORDER — LORAZEPAM 2 MG/ML
.5-1 INJECTION INTRAMUSCULAR EVERY 6 HOURS PRN
Status: CANCELLED | OUTPATIENT
Start: 2017-11-15

## 2017-11-14 RX ORDER — DIPHENHYDRAMINE HYDROCHLORIDE 50 MG/ML
50 INJECTION INTRAMUSCULAR; INTRAVENOUS
Status: CANCELLED
Start: 2017-11-15

## 2017-11-14 RX ORDER — PROCHLORPERAZINE MALEATE 5 MG
10 TABLET ORAL EVERY 6 HOURS PRN
Status: CANCELLED
Start: 2017-11-15

## 2017-11-14 RX ORDER — EPINEPHRINE 1 MG/ML
0.3 INJECTION, SOLUTION, CONCENTRATE INTRAVENOUS EVERY 5 MIN PRN
Status: CANCELLED | OUTPATIENT
Start: 2017-11-15

## 2017-11-14 RX ORDER — MEPERIDINE HYDROCHLORIDE 25 MG/ML
25 INJECTION INTRAMUSCULAR; INTRAVENOUS; SUBCUTANEOUS EVERY 30 MIN PRN
Status: CANCELLED | OUTPATIENT
Start: 2017-11-15

## 2017-11-14 RX ORDER — HEPARIN SODIUM (PORCINE) LOCK FLUSH IV SOLN 100 UNIT/ML 100 UNIT/ML
500 SOLUTION INTRAVENOUS EVERY 8 HOURS
Status: DISCONTINUED | OUTPATIENT
Start: 2017-11-14 | End: 2017-11-14 | Stop reason: HOSPADM

## 2017-11-14 RX ORDER — ALBUTEROL SULFATE 0.83 MG/ML
2.5 SOLUTION RESPIRATORY (INHALATION)
Status: CANCELLED | OUTPATIENT
Start: 2017-11-15

## 2017-11-14 RX ORDER — HEPARIN SODIUM (PORCINE) LOCK FLUSH IV SOLN 100 UNIT/ML 100 UNIT/ML
500 SOLUTION INTRAVENOUS EVERY 8 HOURS
Status: CANCELLED
Start: 2017-11-14

## 2017-11-14 RX ORDER — METHYLPREDNISOLONE SODIUM SUCCINATE 125 MG/2ML
125 INJECTION, POWDER, LYOPHILIZED, FOR SOLUTION INTRAMUSCULAR; INTRAVENOUS
Status: CANCELLED
Start: 2017-11-15

## 2017-11-14 RX ORDER — OXYBUTYNIN CHLORIDE 5 MG/1
TABLET ORAL
Qty: 90 TABLET | Refills: 1 | Status: SHIPPED | OUTPATIENT
Start: 2017-11-14 | End: 2018-02-06

## 2017-11-14 RX ORDER — ALBUTEROL SULFATE 90 UG/1
1-2 AEROSOL, METERED RESPIRATORY (INHALATION)
Status: CANCELLED
Start: 2017-11-15

## 2017-11-14 RX ORDER — ACETAMINOPHEN 325 MG/1
650 TABLET ORAL
Status: CANCELLED
Start: 2017-11-15

## 2017-11-14 RX ORDER — SODIUM CHLORIDE 9 MG/ML
1000 INJECTION, SOLUTION INTRAVENOUS CONTINUOUS PRN
Status: CANCELLED
Start: 2017-11-15

## 2017-11-14 RX ADMIN — SODIUM CHLORIDE, PRESERVATIVE FREE 500 UNITS: 5 INJECTION INTRAVENOUS at 12:31

## 2017-11-14 RX ADMIN — TRASTUZUMAB 380 MG: 150 INJECTION, POWDER, LYOPHILIZED, FOR SOLUTION INTRAVENOUS at 11:47

## 2017-11-14 RX ADMIN — SODIUM CHLORIDE 250 ML: 9 INJECTION, SOLUTION INTRAVENOUS at 11:44

## 2017-11-14 NOTE — TELEPHONE ENCOUNTER
Requested Prescriptions   Pending Prescriptions Disp Refills     oxybutynin (DITROPAN) 5 MG tablet [Pharmacy Med Name: OXYBUTYNIN CHLORIDE 5MG TABS] 90 tablet 0     Sig: TAKE ONE-HALF TABLET BY MOUTH TWICE A DAY    Muscarinic Antagonists (Urinary Incontinence Agents) Passed    11/10/2017  3:45 PM       Passed - Recent or future visit with authorizing provider's specialty    Patient had office visit in the last year or has a visit in the next 30 days with authorizing provider.  See chart review.     Last ov 10/10/2017         Passed - Medication is Oxybutynin and patient is 5 years of age or older       Passed - Patient does not have a diagnosis of glaucoma on the problem list    If glaucoma diagnosis is new, refer refill to physician.         Passed - Patient is 18 years of age or older        Prescription approved per [facility] Refill Protocol.  Larry Meadows, RN, BSN

## 2017-11-14 NOTE — PROGRESS NOTES
Patient here for Herceptin chemotherapy today. BSA/Dose verified by 2 RN'S.   Tolerated chemotherapy well.  Positive blood return in port pre/post infusion. Discharged in stable condition.

## 2017-11-14 NOTE — PATIENT INSTRUCTIONS
Pt to return on 12/5/17 for Herceptin. Copies of medication list and upcoming appointments given prior to discharge.

## 2017-11-14 NOTE — MR AVS SNAPSHOT
After Visit Summary   11/14/2017    Michaela Dorman    MRN: [id number]           Patient Information     Date Of Birth          1950        Visit Information        Provider Department      11/14/2017 11:00 AM NL INFUSION CHAIR 2 [facility]        Today's Diagnoses     Encounter for antineoplastic chemotherapy    -  1    Malignant neoplasm of upper-outer quadrant of left female breast, unspecified estrogen receptor status (H)        Malignant neoplasm of upper-outer quadrant of left breast in female, estrogen receptor negative (H)          Care Instructions    Pt to return on 12/5/17 for Herceptin. Copies of medication list and upcoming appointments given prior to discharge.             Follow-ups after your visit        Your next 10 appointments already scheduled     Dec 05, 2017 11:00 AM CST   Return Visit with Syeda Ferguson MD   [facility] ([address] [id number] [phone]            Dec 05, 2017 11:30 AM CST   Level 2 with NL INFUSION CHAIR 1   [facility] ([address] [id number] [phone]            Dec 19, 2017 12:30 PM CST   Return Visit with Loren Felipe MD   [facility] ([facility])    [address] [place] [id number] [phone]              Who to contact     If you have questions or need follow up information about today's clinic visit or your schedule please contact [facility] IN[facility] directly at [phone].  Normal or non-critical lab and imaging results will be communicated to you by MyChart, letter or phone within 4 business days after the clinic has received the results. If you do not hear from us within 7 days, please contact the clinic through MyChart or phone. If you have a critical or abnormal lab result, we will notify  you by phone as soon as possible.  Submit refill requests through [facility] or call your pharmacy and they will forward the refill request to us. Please allow 3 business days for your refill to be completed.          Additional Information About Your Visit        [employer] Information     [facility] gives you secure access to your electronic health record. If you see a primary care provider, you can also send messages to your care team and make appointments. If you have questions, please call your primary care clinic.  If you do not have a primary care provider, please call [phone] and they will assist you.        Care EveryWhere ID     This is your Care EveryWhere ID. This could be used by other organizations to access your [place] medical records  [id number]        Your Vitals Were     Pulse Temperature Respirations Pulse Oximetry BMI (Body Mass Index)       75 98.4  F (36.9  C) (Temporal) 18 96% 22.5 kg/m2        Blood Pressure from Last 3 Encounters:   11/14/17 140/77   10/24/17 128/66   10/03/17 155/81    Weight from Last 3 Encounters:   11/14/17 63.2 kg (139 lb 6.4 oz)   10/24/17 65.4 kg (144 lb 3.2 oz)   10/10/17 65.8 kg (145 lb)              Today, you had the following     No orders found for display       Primary Care Provider Office Phone # Fax #    Phani Tapia PA-C [phone]       [address] [facility] [address]        Equal Access to Services     DAYAMI ARTIS : Hadii aad ku hadasho Soomaali, waaxda luqadaha, qaybta kaalmada adeegyada, wale pena. So [facility] [phone].    ATENCIÓN: Si habla español, tiene a ordoñez disposición servicios gratuitos de asistencia lingüística. Llame al [phone].    We comply with applicable federal civil rights laws and [place] laws. We do not discriminate on the basis of race, color, national origin, age, disability, sex, sexual orientation, or gender identity.            Thank you!     Thank you for choosing [place]  [facility]  for your care. Our goal is always to provide you with excellent care. Hearing back from our patients is one way we can continue to improve our services. Please take a few minutes to complete the written survey that you may receive in the mail after your visit with us. Thank you!             Your Updated Medication List - Protect others around you: Learn how to safely use, store and throw away your medicines at www.disposemymeds.org.          This list is accurate as of: 11/14/17  3:13 PM.  Always use your most recent med list.                   Brand Name Dispense Instructions for use Diagnosis    atorvastatin 20 MG tablet    LIPITOR    90 tablet    Take 1 tablet (20 mg) by mouth daily    Hyperlipidemia LDL goal <130       ciprofloxacin 250 MG tablet    CIPRO    90 tablet    Take 1 tablet (250 mg) by mouth daily    Chronic lower urinary tract infection       hydrochlorothiazide 25 MG tablet    HYDRODIURIL    90 tablet    Take 1 tablet (25 mg) by mouth daily    Essential hypertension       LORazepam 0.5 MG tablet    ATIVAN    15 tablet    Take 1 tablet (0.5 mg) by mouth every 4 hours as needed (Anxiety, Nausea/Vomiting or Sleep)    Malignant neoplasm of upper-outer quadrant of left female breast (H), Encounter for antineoplastic chemotherapy       * oxybutynin 5 MG tablet    DITROPAN    90 tablet    TAKE ONE-HALF TABLET BY MOUTH TWICE A DAY    Dysuria       * oxybutynin 5 MG tablet    DITROPAN    90 tablet    TAKE ONE-HALF TABLET BY MOUTH TWICE A DAY    Dysuria       oxyCODONE IR 5 MG tablet    ROXICODONE    20 tablet    Take 1 tablet (5 mg) by mouth every 6 hours as needed for pain        oxyCODONE-acetaminophen 5-325 MG per tablet    PERCOCET    20 tablet    Take 1 tablet by mouth every 4 hours as needed for pain    Post-op pain, S/P reverse total shoulder arthroplasty, right, S/P bilateral mastectomy, Trochanteric bursitis, unspecified laterality       priLOSEC 20 MG CR capsule    Generic drug:  omeprazole      Take by mouth daily        prochlorperazine 10 MG tablet    COMPAZINE    30 tablet    Take 1 tablet (10 mg) by mouth every 6 hours as needed (Nausea/Vomiting)    Encounter for antineoplastic chemotherapy       TYLENOL EX ST ARTHRITIS PAIN 500 MG tablet   Generic drug:  acetaminophen      Take 500-1,000 mg by mouth every 6 hours as needed for mild pain    Dermatitis due to sun       * venlafaxine 75 MG 24 hr capsule    EFFEXOR-XR     Take 75 mg by mouth daily        * venlafaxine 37.5 MG 24 hr capsule    EFFEXOR XR    90 capsule    Take 1 capsule (37.5 mg) by mouth daily For one week then 2 daily for a week then 3 daily for 3-4 weeks.    Adjustment disorder with depressed mood       * Notice:  This list has 4 medication(s) that are the same as other medications prescribed for you. Read the directions carefully, and ask your doctor or other care provider to review them with you.

## 2017-11-15 RX ORDER — ALBUTEROL SULFATE 0.83 MG/ML
2.5 SOLUTION RESPIRATORY (INHALATION)
Status: CANCELLED | OUTPATIENT
Start: 2017-12-07

## 2017-11-15 RX ORDER — LORAZEPAM 0.5 MG/1
.5-1 TABLET ORAL EVERY 6 HOURS PRN
Status: CANCELLED
Start: 2017-12-07

## 2017-11-15 RX ORDER — PROCHLORPERAZINE MALEATE 10 MG
10 TABLET ORAL EVERY 6 HOURS PRN
Status: CANCELLED
Start: 2017-12-07

## 2017-11-15 RX ORDER — EPINEPHRINE 1 MG/ML
0.3 INJECTION, SOLUTION, CONCENTRATE INTRAVENOUS EVERY 5 MIN PRN
Status: CANCELLED | OUTPATIENT
Start: 2017-12-07

## 2017-11-15 RX ORDER — DIPHENHYDRAMINE HYDROCHLORIDE 50 MG/ML
50 INJECTION INTRAMUSCULAR; INTRAVENOUS
Status: CANCELLED
Start: 2017-12-07

## 2017-11-15 RX ORDER — ALBUTEROL SULFATE 90 UG/1
1-2 AEROSOL, METERED RESPIRATORY (INHALATION)
Status: CANCELLED
Start: 2017-12-07

## 2017-11-15 RX ORDER — METHYLPREDNISOLONE SODIUM SUCCINATE 125 MG/2ML
125 INJECTION, POWDER, LYOPHILIZED, FOR SOLUTION INTRAMUSCULAR; INTRAVENOUS
Status: CANCELLED
Start: 2017-12-07

## 2017-11-15 RX ORDER — LORAZEPAM 2 MG/ML
.5-1 INJECTION INTRAMUSCULAR EVERY 6 HOURS PRN
Status: CANCELLED | OUTPATIENT
Start: 2017-12-07

## 2017-11-15 RX ORDER — ACETAMINOPHEN 325 MG/1
650 TABLET ORAL
Status: CANCELLED
Start: 2017-12-07

## 2017-11-15 RX ORDER — MEPERIDINE HYDROCHLORIDE 25 MG/ML
25 INJECTION INTRAMUSCULAR; INTRAVENOUS; SUBCUTANEOUS EVERY 30 MIN PRN
Status: CANCELLED | OUTPATIENT
Start: 2017-12-07

## 2017-11-15 RX ORDER — SODIUM CHLORIDE 9 MG/ML
1000 INJECTION, SOLUTION INTRAVENOUS CONTINUOUS PRN
Status: CANCELLED
Start: 2017-12-07

## 2017-11-20 ENCOUNTER — MYC MEDICAL ADVICE (OUTPATIENT)
Dept: FAMILY MEDICINE | Facility: OTHER | Age: 67
End: 2017-11-20

## 2017-11-27 NOTE — PROGRESS NOTES
SUBJECTIVE:                                                    Michaela Dorman is a 67 year old female who presents to clinic today for the following health issues:      HPI  The 10-year ASCVD risk score (Nadira GIRON Jr, et al., 2013) is: 9.7%    Values used to calculate the score:      Age: 67 years      Sex: Female      Is Non- : No      Diabetic: No      Tobacco smoker: No      Systolic Blood Pressure: 140 mmHg      Is BP treated: No      HDL Cholesterol: 67 mg/dL      Total Cholesterol: 273 mg/dL  Patient is eligible for use of low-dose aspirin for primary prevention of heart attack and stroke.  Provider has discussed aspirin with patient and our decision was:     Contraindicated:  Daily low-dose aspirin for primary prevention contraindicated, provider does not recommend.        Hyperlipidemia Follow-Up      Rate your low fat/cholesterol diet?: good    Taking statin?  Yes, no muscle aches from statin    Other lipid medications/supplements?:  none    Hypertension Follow-up      Outpatient blood pressures are not being checked.    Low Salt Diet: not monitoring salt    Chronic Pain Follow-Up       Type / Location of Pain: Back Pain  Analgesia/pain control:       Recent changes:  Worse- Pain now goes all the way down left leg to ankle      Overall control: Inadequate pain control  Activity level/function:      Daily activities:  Able to do light housework, cooking    Work:  not applicable  Adverse effects:  No  Adherance    Taking medication as directed?  Yes    Participating in other treatments: yes  Risk Factors:    Sleep:  Good    Mood/anxiety:  controlled    Recent family or social stressors:  none noted    Other aggravating factors: prolonged standing  PHQ-9 SCORE 3/29/2017 9/25/2017 11/30/2017   Total Score - - -   Total Score MyChart - 12 (Moderate depression) 5 (Mild depression)   Total Score 15 12 5     [place]USTIN-7 SCORE 8/31/2016 3/29/2017 9/25/2017   Total Score - - 3 (minimal anxiety)    Total Score 0 15 3     Encounter-Level CSA:     There are no encounter-level csa.        Problem list and histories reviewed & adjusted, as indicated.  Additional history: as documented    Patient Active Problem List   Diagnosis     Enthesopathy of hip region     Family history of stroke (cerebrovascular)     Trochanteric bursitis     Advanced directives, counseling/discussion     Health Care Home     Baker's cyst of knee     Patella-femoral syndrome     Adjustment disorder with depressed mood     Essential hypertension     Malignant neoplasm of upper-outer quadrant of left female breast (H)     Encounter for antineoplastic chemotherapy     S/P bilateral mastectomy     Anxiety     Hyperlipidemia LDL goal <130     S/P reverse total shoulder arthroplasty, right     Prophylactic antibiotic for dental procedure indicated due to prior joint replacement     Chronic pain syndrome     Lumbar radiculopathy     Past Surgical History:   Procedure Laterality Date     ARTHROPLASTY SHOULDER Right 11/17/2015     ARTHROSCOPY KNEE  6/7/2012    Procedure:ARTHROSCOPY KNEE; right knee arthroscopy with partial lateral menisectomy; Surgeon:DAMIÁN MCALLISTER; Location:[facility] OR     C LIGATE FALLOPIAN TUBE       C NONSPECIFIC PROCEDURE  1956    ankle fracture surgery     EXCISE MASS AXILLA Left 9/16/2016    Procedure: EXCISE MASS AXILLA;  Surgeon: Keyon Blanco MD;  Location: [facility] OR     HC REMV CATARACT EXTRACAP,INSERT LENS  6/25/2009    Right eye     INSERT PORT VASCULAR ACCESS Right 10/7/2016    Procedure: INSERT PORT VASCULAR ACCESS;  Surgeon: Keyon Blanco MD;  Location: [facility] OR     MASTECTOMY SIMPLE BILATERAL Bilateral 2/8/2017    Procedure: MASTECTOMY SIMPLE BILATERAL;  Surgeon: Keyon Blanco MD;  Location: [facility] OR     OPEN REDUCTION INTERNAL FIXATION FOOT Right 3/20/2015    Procedure: OPEN REDUCTION INTERNAL FIXATION FOOT;  Surgeon: Javi Herrmann DPM;  Location: [facility] OR     SHOULDER SURGERY Right 11/2015       Social  History   Substance Use Topics     Smoking status: Former Smoker     Packs/day: 3.00     Years: 10.00     Types: Cigarettes     Quit date: 12/1/1979     Smokeless tobacco: Never Used      Comment: approx. 3 packs daily     Alcohol use 0.0 oz/week     0 Standard drinks or equivalent per week      Comment: daily glass of wine     Family History   Problem Relation Age of Onset     Hypertension Mother      Thyroid Disease Mother      CEREBROVASCULAR DISEASE Mother      Hypertension Father      CEREBROVASCULAR DISEASE Father      CANCER Father      skin     Thyroid Disease Sister      DIABETES Brother      type 2     Depression Sister      Breast Cancer Sister      age 47     CANCER Sister      skin     CANCER Brother      inside nose         Current Outpatient Prescriptions   Medication Sig Dispense Refill     oxyCODONE IR (ROXICODONE) 5 MG tablet Take 1 tablet (5 mg) by mouth every 6 hours as needed for pain 20 tablet 0     oxybutynin (DITROPAN) 5 MG tablet TAKE ONE-HALF TABLET BY MOUTH TWICE A DAY 90 tablet 1     venlafaxine (EFFEXOR-XR) 75 MG 24 hr capsule Take 75 mg by mouth daily       ciprofloxacin (CIPRO) 250 MG tablet Take 1 tablet (250 mg) by mouth daily 90 tablet 1     venlafaxine (EFFEXOR XR) 37.5 MG 24 hr capsule Take 1 capsule (37.5 mg) by mouth daily For one week then 2 daily for a week then 3 daily for 3-4 weeks. 90 capsule 1     oxyCODONE-acetaminophen (PERCOCET) 5-325 MG per tablet Take 1 tablet by mouth every 4 hours as needed for pain 20 tablet 0     atorvastatin (LIPITOR) 20 MG tablet Take 1 tablet (20 mg) by mouth daily 90 tablet 1     prochlorperazine (COMPAZINE) 10 MG tablet Take 1 tablet (10 mg) by mouth every 6 hours as needed (Nausea/Vomiting) 30 tablet 3     oxybutynin (DITROPAN) 5 MG tablet TAKE ONE-HALF TABLET BY MOUTH TWICE A DAY 90 tablet 0     hydrochlorothiazide (HYDRODIURIL) 25 MG tablet Take 1 tablet (25 mg) by mouth daily 90 tablet 1     LORazepam (ATIVAN) 0.5 MG tablet Take 1 tablet  (0.5 mg) by mouth every 4 hours as needed (Anxiety, Nausea/Vomiting or Sleep) 15 tablet 0     acetaminophen (TYLENOL EX ST ARTHRITIS PAIN) 500 MG tablet Take 500-1,000 mg by mouth every 6 hours as needed for mild pain       omeprazole (PRILOSEC) 20 MG capsule Take by mouth daily       Allergies   Allergen Reactions     No Known Drug Allergies      BP Readings from Last 3 Encounters:   11/30/17 120/64   11/14/17 140/77   10/24/17 128/66    Wt Readings from Last 3 Encounters:   11/30/17 135 lb (61.2 kg)   11/14/17 139 lb 6.4 oz (63.2 kg)   10/24/17 144 lb 3.2 oz (65.4 kg)                        ROS:  Constitutional, HEENT, cardiovascular, pulmonary, gi and gu systems are negative, except as otherwise noted.      OBJECTIVE:   /64 (Cuff Size: Adult Regular)  Pulse 84  Temp 98.1  F (36.7  C) (Temporal)  Resp 16  Wt 135 lb (61.2 kg)  BMI 21.79 kg/m2  Body mass index is 21.79 kg/(m^2).  GENERAL: healthy, alert and no distress  RESP: lungs clear to auscultation - no rales, rhonchi or wheezes  CV: regular rate and rhythm, normal S1 S2, no S3 or S4, no murmur, click or rub, no peripheral edema and peripheral pulses strong  ABDOMEN: soft, nontender, no hepatosplenomegaly, no masses and bowel sounds normal  MS: no gross musculoskeletal defects noted, no edema  Comprehensive back pain exam:  Tenderness of left low back, Pain limits the following motions: walking is problematic, Lower extremity strength functional and equal on both sides, Lower extremity reflexes within normal limits bilaterally, Lower extremity sensation normal and equal on both sides and Straight leg positive on  left, indicating possible ipsilateral radiculopathy  PSYCH: mentation appears normal, affect normal/bright    Diagnostic Test Results:  Results for orders placed or performed in visit on 10/24/17   CBC with platelets   Result Value Ref Range    WBC 5.6 4.0 - 11.0 10e9/L    RBC Count 3.66 (L) 3.8 - 5.2 10e12/L    Hemoglobin 11.7 11.7 - 15.7  g/dL    Hematocrit 37.3 35.0 - 47.0 %     (H) 78 - 100 fl    MCH 32.0 26.5 - 33.0 pg    MCHC 31.4 (L) 31.5 - 36.5 g/dL    RDW 14.7 10.0 - 15.0 %    Platelet Count 294 150 - 450 10e9/L   Comprehensive metabolic panel   Result Value Ref Range    Sodium 139 133 - 144 mmol/L    Potassium 3.9 3.4 - 5.3 mmol/L    Chloride 102 94 - 109 mmol/L    Carbon Dioxide 27 20 - 32 mmol/L    Anion Gap 10 3 - 14 mmol/L    Glucose 90 70 - 99 mg/dL    Urea Nitrogen 28 7 - 30 mg/dL    Creatinine 0.97 0.52 - 1.04 mg/dL    GFR Estimate 57 (L) >60 mL/min/1.7m2    GFR Estimate If Black 69 >60 mL/min/1.7m2    Calcium 9.6 8.5 - 10.1 mg/dL    Bilirubin Total 0.3 0.2 - 1.3 mg/dL    Albumin 4.4 3.4 - 5.0 g/dL    Protein Total 7.5 6.8 - 8.8 g/dL    Alkaline Phosphatase 59 40 - 150 U/L    ALT 33 0 - 50 U/L    AST 28 0 - 45 U/L       ASSESSMENT/PLAN:     1. Screen for colon cancer  Advised soon    2. Need for prophylactic vaccination and inoculation against influenza  Due but she is still having cancer treatment    3. Need for prophylactic vaccination against Streptococcus pneumoniae (pneumococcus)  Due but she is still having cancer treatment    4. Chronic pain syndrome  5. Lumbar radiculopathy  Refilled meds  - Albumin Random Urine Quantitative with Creat Ratio  - HONORING CHOICES REFERRAL  - oxyCODONE IR (ROXICODONE) 5 MG tablet; Take 1 tablet (5 mg) by mouth every 6 hours as needed for pain  Dispense: 20 tablet; Refill: 0  - XR Lumbar Spine 2/3 Views; Future  - MR Lumbar Spine w/o Contrast; Future    ROV 4-6 weeks,    Phani Jason PA-C  [facility]  4e  Answers for HPI/ROS submitted by the patient on 11/30/2017   If you checked off any problems, how difficult have these problems made it for you to do your work, take care of things at home, or get along with other people?: Somewhat difficult  PHQ9 TOTAL SCORE: 5

## 2017-11-30 ENCOUNTER — HOSPITAL ENCOUNTER (OUTPATIENT)
Dept: MRI IMAGING | Facility: CLINIC | Age: 67
Discharge: HOME | End: 2017-11-30
Attending: PHYSICIAN ASSISTANT | Admitting: PHYSICIAN ASSISTANT
Payer: MEDICARE

## 2017-11-30 ENCOUNTER — OFFICE VISIT (OUTPATIENT)
Dept: FAMILY MEDICINE | Facility: OTHER | Age: 67
End: 2017-11-30
Payer: COMMERCIAL

## 2017-11-30 ENCOUNTER — RADIANT APPOINTMENT (OUTPATIENT)
Dept: GENERAL RADIOLOGY | Facility: OTHER | Age: 67
End: 2017-11-30
Attending: PHYSICIAN ASSISTANT
Payer: COMMERCIAL

## 2017-11-30 VITALS
TEMPERATURE: 98.1 F | HEART RATE: 84 BPM | SYSTOLIC BLOOD PRESSURE: 120 MMHG | DIASTOLIC BLOOD PRESSURE: 64 MMHG | WEIGHT: 135 LBS | RESPIRATION RATE: 16 BRPM | BODY MASS INDEX: 21.79 KG/M2

## 2017-11-30 DIAGNOSIS — M54.16 LUMBAR RADICULOPATHY: ICD-10-CM

## 2017-11-30 DIAGNOSIS — G89.4 CHRONIC PAIN SYNDROME: ICD-10-CM

## 2017-11-30 DIAGNOSIS — G89.4 CHRONIC PAIN SYNDROME: Primary | ICD-10-CM

## 2017-11-30 DIAGNOSIS — Z12.11 SCREEN FOR COLON CANCER: ICD-10-CM

## 2017-11-30 DIAGNOSIS — Z23 NEED FOR PROPHYLACTIC VACCINATION AND INOCULATION AGAINST INFLUENZA: ICD-10-CM

## 2017-11-30 DIAGNOSIS — Z23 NEED FOR PROPHYLACTIC VACCINATION AGAINST STREPTOCOCCUS PNEUMONIAE (PNEUMOCOCCUS): ICD-10-CM

## 2017-11-30 PROCEDURE — 72148 MRI LUMBAR SPINE W/O DYE: CPT

## 2017-11-30 PROCEDURE — 99214 OFFICE O/P EST MOD 30 MIN: CPT | Performed by: PHYSICIAN ASSISTANT

## 2017-11-30 PROCEDURE — 72100 X-RAY EXAM L-S SPINE 2/3 VWS: CPT

## 2017-11-30 RX ORDER — OXYCODONE HYDROCHLORIDE 5 MG/1
5 TABLET ORAL EVERY 6 HOURS PRN
Qty: 20 TABLET | Refills: 0 | Status: SHIPPED | OUTPATIENT
Start: 2017-11-30 | End: 2018-02-26

## 2017-11-30 ASSESSMENT — PATIENT HEALTH QUESTIONNAIRE - PHQ9
SUM OF ALL RESPONSES TO PHQ QUESTIONS 1-9: 5
SUM OF ALL RESPONSES TO PHQ QUESTIONS 1-9: 5
10. IF YOU CHECKED OFF ANY PROBLEMS, HOW DIFFICULT HAVE THESE PROBLEMS MADE IT FOR YOU TO DO YOUR WORK, TAKE CARE OF THINGS AT HOME, OR GET ALONG WITH OTHER PEOPLE: SOMEWHAT DIFFICULT

## 2017-11-30 ASSESSMENT — PAIN SCALES - GENERAL: PAINLEVEL: MILD PAIN (3)

## 2017-11-30 NOTE — NURSING NOTE
"Chief Complaint   Patient presents with     Back Pain     Panel Management     pneumococcal, flu, microalbumin, colon, honoring choices, PHQ9       Initial /64 (Cuff Size: Adult Regular)  Pulse 84  Temp 98.1  F (36.7  C) (Temporal)  Resp 16  Wt 135 lb (61.2 kg)  BMI 21.79 kg/m2 Estimated body mass index is 21.79 kg/(m^2) as calculated from the following:    Height as of 10/24/17: 5' 6\" (1.676 m).    Weight as of this encounter: 135 lb (61.2 kg).  Medication Reconciliation: complete   Rocio Velásquez CMA (AAMA)    "

## 2017-11-30 NOTE — MR AVS SNAPSHOT
After Visit Summary   11/30/2017    Michaela Dorman    MRN: [id number]           Patient Information     Date Of Birth          1950        Visit Information        Provider Department      11/30/2017 9:00 AM Phani Tapia PA-C [facility]        Today's Diagnoses     Chronic pain syndrome    -  1    Screen for colon cancer        Need for prophylactic vaccination and inoculation against influenza        Need for prophylactic vaccination against Streptococcus pneumoniae (pneumococcus)        Lumbar radiculopathy           Follow-ups after your visit        Additional Services     GASTROENTEROLOGY ADULT REF PROCEDURE ONLY       Last Lab Result: Creatinine (mg/dL)       Date                     Value                 10/24/2017               0.97             ----------  Body mass index is 21.79 kg/(m^2).     Needed:  No  Language:  English    Patient will be contacted to schedule procedure.     Please be aware that coverage of these services is subject to the terms and limitations of your health insurance plan.  Call member services at your health plan with any benefit or coverage questions.  Any procedures must be performed at a [facility] facility OR coordinated by your clinic's referral office.    Please bring the following with you to your appointment:    (1) Any X-Rays, CTs or MRIs which have been performed.  Contact the facility where they were done to arrange for  prior to your scheduled appointment.    (2) List of current medications   (3) This referral request   (4) Any documents/labs given to you for this referral            [facility] REFERRAL       Your provider has referred you to [facility]patient [facility] Advance Care Planning Facilitator or Serious illness clinic support staff. The facilitator or support staff will contact you to schedule the appointment or for the follow up call    Reason for Referral: Basic Advance Care Planning - 1:1 need                   Follow-up notes from your care team     Return in about 4 weeks (around 12/28/2017).      Your next 10 appointments already scheduled     Nov 30, 2017  2:45 PM CST   (Arrive by 2:30 PM)   MR LUMBAR SPINE W/O CONTRAST with PHMR1   [facility] ([facility])    [address] [address]   [phone]           Take your medicines as usual, unless your doctor tells you not to. Bring a list of your current medicines to your exam (including vitamins, minerals and over-the-counter drugs). Also bring the results of similar scans you may have had.  Please remove any body piercings and hair extensions before you arrive.  Follow your doctor s orders. If you do not, we may have to postpone your exam.  You will not have contrast for this exam. You do not need to do anything special to prepare.  The MRI machine uses a strong magnet. Please wear clothes without metal (snaps, zippers). A sweatsuit works well, or we may give you a hospital gown.   **IMPORTANT** THE INSTRUCTIONS BELOW ARE ONLY FOR THOSE PATIENTS WHO HAVE BEEN TOLD THEY WILL RECEIVE SEDATION OR GENERAL ANESTHESIA DURING THEIR MRI PROCEDURE:  IF YOU WILL RECEIVE SEDATION (take medicine to help you relax during your exam):   You must get the medicine from your doctor before you arrive. Bring the medicine to the exam. Do not take it at home.   Arrive one hour early. Bring someone who can take you home after the test. Your medicine will make you sleepy. After the exam, you may not drive, take a bus or take a taxi by yourself.   No eating 8 hours before your exam. You may have clear liquids up until 4 hours before your exam. (Clear liquids include water, clear tea, black coffee and fruit juice without pulp.)  IF YOU WILL RECEIVE ANESTHESIA (be asleep for your exam):   Arrive 1 1/2 hours early. Bring someone who can take you home after the test. You may not drive, take a bus or take a taxi by yourself.   No  eating 8 hours before your exam. You may have clear liquids up until 4 hours before your exam. (Clear liquids include water, clear tea, black coffee and fruit juice without pulp.)   You will spend four to five hours in the recovery room.  Please call the Imaging Department at your exam site with any questions.            Dec 05, 2017 11:00 AM CST   [facility]   [facility]    [address] [id number] [phone]            Dec 05, 2017 11:30 AM CST   Level 2 with NL INFUSION CHAIR 1   [facility] ([facility])    [address] [place] [id number] [phone]            Dec 19, 2017 12:30 PM CST   Return Visit with Loren Felipe MD   [facility]    [address] [place] [id number] [phone]              Future tests that were ordered for you today     Open Future Orders        Priority Expected Expires Ordered    Colonoscopy [87702] Routine  2/28/2018 11/30/2017    MR Lumbar Spine w/o Contrast Routine  11/30/2018 11/30/2017            Who to contact     If you have questions or need follow up information about today's clinic visit or your schedule please contact [facility] directly at [phone].  Normal or non-critical lab and imaging results will be communicated to you by MyChart, letter or phone within 4 business days after the clinic has received the results. If you do not hear from us within 7 days, please contact the clinic through [facility] or phone. If you have a critical or abnormal lab result, we will notify you by phone as soon as possible.  Submit refill requests through [employer] or call your pharmacy and they will forward the refill request to us. Please allow 3 business days for your refill to be completed.          Additional Information About Your Visit        [employer]  Information     [facility] gives you secure access to your electronic health record. If you see a primary care provider, you can also send messages to your care team and make appointments. If you have questions, please call your primary care clinic.  If you do not have a primary care provider, please call [phone] and they will assist you.        Care EveryWhere ID     This is your Care EveryWhere ID. This could be used by other organizations to access your [place] medical records  [id number]        Your Vitals Were     Pulse Temperature Respirations BMI (Body Mass Index)          84 98.1  F (36.7  C) (Temporal) 16 21.79 kg/m2         Blood Pressure from Last 3 Encounters:   11/30/17 120/64   11/14/17 140/77   10/24/17 128/66    Weight from Last 3 Encounters:   11/30/17 135 lb (61.2 kg)   11/14/17 139 lb 6.4 oz (63.2 kg)   10/24/17 144 lb 3.2 oz (65.4 kg)              We Performed the Following     Albumin Random Urine Quantitative with Creat Ratio     GASTROENTEROLOGY ADULT REF PROCEDURE ONLY     [facility] REFERRAL          Where to get your medicines      Some of these will need a paper prescription and others can be bought over the counter.  Ask your nurse if you have questions.     Bring a paper prescription for each of these medications     oxyCODONE IR 5 MG tablet          Primary Care Provider Office Phone # Fax #    Phani Tapia PA-C [phone]       [place] [phone]        Equal Access to Services     SHIVANI ARTIS AH: [place]ii [place] Sojose, waaxda luqadaha, qaybta kaalmada padmini, waxzeenat milena pena. So [facility] [phone].    ATENCIÓN: Si habla español, tiene a ordoñez disposición servicios gratuitos de asistencia lingüística. Llame al [phone].    We comply with applicable federal civil rights laws and [place] laws. We do not discriminate on the basis of race, color, national origin, age, disability, sex,  sexual orientation, or gender identity.            Thank you!     Thank you for choosing [facility]  for your care. Our goal is always to provide you with excellent care. Hearing back from our patients is one way we can continue to improve our services. Please take a few minutes to complete the written survey that you may receive in the mail after your visit with us. Thank you!             Your Updated Medication List - Protect others around you: Learn how to safely use, store and throw away your medicines at www.disposemymeds.org.          This list is accurate as of: 11/30/17  9:33 AM.  Always use your most recent med list.                   Brand Name Dispense Instructions for use Diagnosis    atorvastatin 20 MG tablet    LIPITOR    90 tablet    Take 1 tablet (20 mg) by mouth daily    Hyperlipidemia LDL goal <130       ciprofloxacin 250 MG tablet    CIPRO    90 tablet    Take 1 tablet (250 mg) by mouth daily    Chronic lower urinary tract infection       hydrochlorothiazide 25 MG tablet    HYDRODIURIL    90 tablet    Take 1 tablet (25 mg) by mouth daily    Essential hypertension       LORazepam 0.5 MG tablet    ATIVAN    15 tablet    Take 1 tablet (0.5 mg) by mouth every 4 hours as needed (Anxiety, Nausea/Vomiting or Sleep)    Malignant neoplasm of upper-outer quadrant of left female breast (H), Encounter for antineoplastic chemotherapy       * oxybutynin 5 MG tablet    DITROPAN    90 tablet    TAKE ONE-HALF TABLET BY MOUTH TWICE A DAY    Dysuria       * oxybutynin 5 MG tablet    DITROPAN    90 tablet    TAKE ONE-HALF TABLET BY MOUTH TWICE A DAY    Dysuria       oxyCODONE IR 5 MG tablet    ROXICODONE    20 tablet    Take 1 tablet (5 mg) by mouth every 6 hours as needed for pain    Chronic pain syndrome, Lumbar radiculopathy       oxyCODONE-acetaminophen 5-325 MG per tablet    PERCOCET    20 tablet    Take 1 tablet by mouth every 4 hours as needed for pain    Post-op pain, S/P reverse total  shoulder arthroplasty, right, S/P bilateral mastectomy, Trochanteric bursitis, unspecified laterality       priLOSEC 20 MG CR capsule   Generic drug:  omeprazole      Take by mouth daily        prochlorperazine 10 MG tablet    COMPAZINE    30 tablet    Take 1 tablet (10 mg) by mouth every 6 hours as needed (Nausea/Vomiting)    Encounter for antineoplastic chemotherapy       TYLENOL EX ST ARTHRITIS PAIN 500 MG tablet   Generic drug:  acetaminophen      Take 500-1,000 mg by mouth every 6 hours as needed for mild pain    Dermatitis due to sun       * venlafaxine 75 MG 24 hr capsule    EFFEXOR-XR     Take 75 mg by mouth daily        * venlafaxine 37.5 MG 24 hr capsule    EFFEXOR XR    90 capsule    Take 1 capsule (37.5 mg) by mouth daily For one week then 2 daily for a week then 3 daily for 3-4 weeks.    Adjustment disorder with depressed mood       * Notice:  This list has 4 medication(s) that are the same as other medications prescribed for you. Read the directions carefully, and ask your doctor or other care provider to review them with you.

## 2017-11-30 NOTE — LETTER
[facility]    11/30/17    Patient: Michaela Dorman  YOB: 1950  Medical Record Number: [id number]                                                                  Controlled Substance Agreement  I understand that my care provider has prescribed controlled substances (narcotics, tranquilizers, and/or stimulants) to help manage my condition(s).  I am taking this medicine to help me function or work.  I know that this is strong medicine.  It could have serious side effects and even cause a dependency on the drug.  If I stop these medicines suddenly, I could have severe withdrawal symptoms.    The risks, benefits, and side effects of these medication(s) were explained to me.  I agree that:  1. I will take part in other treatments as advised by my provider.  This may be psychiatry or counseling, physical therapy, behavioral therapy, group treatment, or a referral to a pain clinic.  I will reduce or stop my medicine when my provider tells me to do so.   2. I will take my medicines as prescribed.  I will not change the dose or schedule unless my provider tells me to.  There will be no refills if I  run out early.   I may be contacted at any time without warning and asked to complete a drug test or pill count.   3. I will keep all my appointments at the clinic.  If I miss appointments or fail to follow instructions, my provider may stop my medicine.  4. I will not ask other providers to prescribe controlled substances. And I will not accept controlled substances from other people. If I need another prescribed controlled substance for a new reason, I will notify my provider within one business day.  5. If I enroll in the [place] Medical Marijuana program, I will tell my provider.  I will also sign an agreement to share my medical records with my provider.  6. I will use one pharmacy to fill all of my controlled substance prescriptions.  If my prescription is mailed to my pharmacy, it may take  5 to 7 days for my medicine to be ready.  7. I understand that my provider, clinic care team, and pharmacy can track controlled substance prescriptions from other providers through a central database (prescription monitoring program).  8. I will bring in my list of medications (or my medicine bottles) each time I come to the clinic.  REV- 04/2016                                                                                                                                            Page 1 of 2      [facility]    11/30/17    Patient: Michaela Dorman  YOB: 1950  Medical Record Number: [id number]    9. Refills of controlled substances will be made only during office hours.  It is up to me to make sure that I do not run out of my medicines on weekends or holidays.    10. I am responsible for my prescriptions.  If the medicine is lost or stolen, it will not be replaced.   I also agree not to share these medicines with anyone.  11. I agree to not use ANY illegal or recreational drugs.  This includes marijuana, cocaine, bath salts or other drugs.  I agree not to use alcohol unless my provider says I may.  I agree to give urine samples whenever asked.  If I fail to give a urine sample, the provider may stop my medicine.     12. I will tell my nurse or provider right away if I become pregnant or have a new medical problem treated outside of [facility].  13. I understand that this medicine can affect my thinking and judgment.  It may be unsafe for me to drive, use machinery and do dangerous tasks.  I will not do any of these things until I know how the medicine affects me.  If my dose changes, I will wait to see how it affects me.  I will contact my provider if I have concerns about medicine side effects.  I understand that if I do not follow any of the conditions above, my prescriptions or treatment may be stopped.    I agree that my provider, clinic care team, and pharmacy may work with  any city, state or federal law enforcement agency that investigates the misuse, sale, or other diversion of my controlled medicine. I will allow my provider to discuss my care with or share a copy of this agreement with any other treating provider, pharmacy or emergency room where I receive care.  I agree to give up (waive) any right of privacy or confidentiality with respect to these authorizations.   I have read this agreement and have asked questions about anything I did not understand.   ___________________________________    ___________________________  Patient Signature                                                           Date and Time  ___________________________________     ____________________________  Witness                                                                            Date and Time  ___________________________________  Phani Jason PA-C  REV-  04/2016                                                                                                                                                                 Page 2 of 2

## 2017-12-01 ENCOUNTER — TELEPHONE (OUTPATIENT)
Dept: FAMILY MEDICINE | Facility: OTHER | Age: 67
End: 2017-12-01

## 2017-12-01 DIAGNOSIS — M48.00 CENTRAL STENOSIS OF SPINAL CANAL: ICD-10-CM

## 2017-12-01 DIAGNOSIS — M48.061 FORAMINAL STENOSIS OF LUMBAR REGION: ICD-10-CM

## 2017-12-01 DIAGNOSIS — I10 ESSENTIAL HYPERTENSION: ICD-10-CM

## 2017-12-01 DIAGNOSIS — G89.4 CHRONIC PAIN SYNDROME: Primary | ICD-10-CM

## 2017-12-01 DIAGNOSIS — M54.16 LUMBAR RADICULOPATHY: ICD-10-CM

## 2017-12-01 DIAGNOSIS — M51.369 DDD (DEGENERATIVE DISC DISEASE), LUMBAR: ICD-10-CM

## 2017-12-01 RX ORDER — METHYLPREDNISOLONE 4 MG
TABLET, DOSE PACK ORAL
Qty: 21 TABLET | Refills: 0 | Status: SHIPPED | OUTPATIENT
Start: 2017-12-01 | End: 2017-12-15

## 2017-12-01 RX ORDER — HYDROCHLOROTHIAZIDE 25 MG/1
TABLET ORAL
Qty: 90 TABLET | Refills: 1 | Status: SHIPPED | OUTPATIENT
Start: 2017-12-01 | End: 2018-06-05

## 2017-12-01 ASSESSMENT — PATIENT HEALTH QUESTIONNAIRE - PHQ9: SUM OF ALL RESPONSES TO PHQ QUESTIONS 1-9: 5

## 2017-12-01 NOTE — TELEPHONE ENCOUNTER
Requested Prescriptions   Pending Prescriptions Disp Refills     hydrochlorothiazide (HYDRODIURIL) 25 MG tablet [Pharmacy Med Name: HYDROCHLOROT 25MG   TAB] 90 tablet 1     Sig: TAKE ONE TABLET BY MOUTH ONCE DAILY    Diuretics (Including Combos) Protocol Passed    12/1/2017  7:41 AM       Passed - Blood pressure under 140/90    BP Readings from Last 3 Encounters:   11/30/17 120/64   11/14/17 140/77   10/24/17 128/66                Passed - Recent or future visit with authorizing provider's specialty    Patient had office visit in the last year or has a visit in the next 30 days with authorizing provider.  See chart review.              Passed - Patient is age 18 or older       Passed - No active pregancy on record       Passed - Normal serum creatinine on file in past 12 months    Recent Labs   Lab Test  10/24/17   1025   CR  0.97             Passed - Normal serum potassium on file in past 12 months    Recent Labs   Lab Test  10/24/17   1025   POTASSIUM  3.9                   Passed - Normal serum sodium on file in past 12 months    Recent Labs   Lab Test  10/24/17   1025   NA  139             Passed - No positive pregnancy test in past 12 months

## 2017-12-01 NOTE — TELEPHONE ENCOUNTER
Discussed MRI. We note severe low back degenerative disease and potential for neurologic impingement. We'll have her seen spine specialist for consideration of injection or the like area she is given a Medrol Dosepak today to see if we can help with some of the overall problems she is having.  Electronically signed:    Phani Jason PA-C

## 2017-12-01 NOTE — TELEPHONE ENCOUNTER
Prescription approved per [facility] [place] [facility] to the [facility] Refill Protocol.    Dotty Barriga RN  [facility]

## 2017-12-04 ENCOUNTER — MYC MEDICAL ADVICE (OUTPATIENT)
Dept: FAMILY MEDICINE | Facility: OTHER | Age: 67
End: 2017-12-04

## 2017-12-04 ENCOUNTER — TELEPHONE (OUTPATIENT)
Dept: FAMILY MEDICINE | Facility: OTHER | Age: 67
End: 2017-12-04

## 2017-12-04 DIAGNOSIS — Z96.611 S/P REVERSE TOTAL SHOULDER ARTHROPLASTY, RIGHT: ICD-10-CM

## 2017-12-04 RX ORDER — AZITHROMYCIN 250 MG/1
500 TABLET, FILM COATED ORAL ONCE
Qty: 4 TABLET | Refills: 0 | Status: SHIPPED | OUTPATIENT
Start: 2017-12-04 | End: 2017-12-04

## 2017-12-05 ENCOUNTER — INFUSION THERAPY VISIT (OUTPATIENT)
Dept: INFUSION THERAPY | Facility: CLINIC | Age: 67
End: 2017-12-05
Attending: INTERNAL MEDICINE
Payer: MEDICARE

## 2017-12-05 ENCOUNTER — ONCOLOGY VISIT (OUTPATIENT)
Dept: ONCOLOGY | Facility: CLINIC | Age: 67
End: 2017-12-05
Payer: COMMERCIAL

## 2017-12-05 VITALS
OXYGEN SATURATION: 96 % | WEIGHT: 136.9 LBS | HEIGHT: 66 IN | TEMPERATURE: 97.5 F | BODY MASS INDEX: 22 KG/M2 | HEART RATE: 81 BPM | DIASTOLIC BLOOD PRESSURE: 75 MMHG | SYSTOLIC BLOOD PRESSURE: 128 MMHG | RESPIRATION RATE: 16 BRPM

## 2017-12-05 VITALS — SYSTOLIC BLOOD PRESSURE: 136 MMHG | HEART RATE: 68 BPM | DIASTOLIC BLOOD PRESSURE: 77 MMHG

## 2017-12-05 DIAGNOSIS — Z51.11 ENCOUNTER FOR ANTINEOPLASTIC CHEMOTHERAPY: Primary | ICD-10-CM

## 2017-12-05 DIAGNOSIS — C50.412 MALIGNANT NEOPLASM OF UPPER-OUTER QUADRANT OF LEFT FEMALE BREAST, UNSPECIFIED ESTROGEN RECEPTOR STATUS (H): ICD-10-CM

## 2017-12-05 DIAGNOSIS — C50.412 MALIGNANT NEOPLASM OF UPPER-OUTER QUADRANT OF LEFT BREAST IN FEMALE, ESTROGEN RECEPTOR NEGATIVE (H): ICD-10-CM

## 2017-12-05 DIAGNOSIS — Z17.1 MALIGNANT NEOPLASM OF UPPER-OUTER QUADRANT OF LEFT BREAST IN FEMALE, ESTROGEN RECEPTOR NEGATIVE (H): Primary | ICD-10-CM

## 2017-12-05 DIAGNOSIS — I10 ESSENTIAL HYPERTENSION: ICD-10-CM

## 2017-12-05 DIAGNOSIS — Z17.1 MALIGNANT NEOPLASM OF UPPER-OUTER QUADRANT OF LEFT BREAST IN FEMALE, ESTROGEN RECEPTOR NEGATIVE (H): ICD-10-CM

## 2017-12-05 DIAGNOSIS — E78.5 HYPERLIPIDEMIA LDL GOAL <130: ICD-10-CM

## 2017-12-05 DIAGNOSIS — F43.21 ADJUSTMENT DISORDER WITH DEPRESSED MOOD: ICD-10-CM

## 2017-12-05 DIAGNOSIS — C50.412 MALIGNANT NEOPLASM OF UPPER-OUTER QUADRANT OF LEFT BREAST IN FEMALE, ESTROGEN RECEPTOR NEGATIVE (H): Primary | ICD-10-CM

## 2017-12-05 PROCEDURE — 25000128 H RX IP 250 OP 636: Performed by: INTERNAL MEDICINE

## 2017-12-05 PROCEDURE — 96413 CHEMO IV INFUSION 1 HR: CPT

## 2017-12-05 PROCEDURE — 99215 OFFICE O/P EST HI 40 MIN: CPT | Performed by: INTERNAL MEDICINE

## 2017-12-05 RX ORDER — HEPARIN SODIUM (PORCINE) LOCK FLUSH IV SOLN 100 UNIT/ML 100 UNIT/ML
500 SOLUTION INTRAVENOUS EVERY 8 HOURS
Status: CANCELLED
Start: 2017-12-05

## 2017-12-05 RX ORDER — HEPARIN SODIUM (PORCINE) LOCK FLUSH IV SOLN 100 UNIT/ML 100 UNIT/ML
500 SOLUTION INTRAVENOUS EVERY 8 HOURS
Status: DISCONTINUED | OUTPATIENT
Start: 2017-12-05 | End: 2017-12-05 | Stop reason: HOSPADM

## 2017-12-05 RX ADMIN — SODIUM CHLORIDE, PRESERVATIVE FREE 500 UNITS: 5 INJECTION INTRAVENOUS at 12:50

## 2017-12-05 RX ADMIN — TRASTUZUMAB 380 MG: 150 INJECTION, POWDER, LYOPHILIZED, FOR SOLUTION INTRAVENOUS at 12:04

## 2017-12-05 RX ADMIN — SODIUM CHLORIDE 250 ML: 0.9 INJECTION, SOLUTION INTRAVENOUS at 11:40

## 2017-12-05 ASSESSMENT — PAIN SCALES - GENERAL: PAINLEVEL: NO PAIN (0)

## 2017-12-05 NOTE — LETTER
12/5/2017         RE: Michaela Dorman  [address]  [facility] [address]        Dear Colleague,    Thank you for referring your patient, Michaela Dorman, to the [facility]. Please see a copy of my visit note below.    Hematology/ Oncology Follow-up Visit:  Dec 5, 2017    Reason for Visit:   Chief Complaint   Patient presents with     Oncology Clinic Visit     6 week follow up for Malignant neoplasm of upper-outer quadrant of left breast in female, estrogen receptor negative     Chemotherapy     C15D1 today       Oncologic History:  Malignant neoplasm of upper-outer quadrant of left female breast (H)  Michaela Dorman  initially felt a mass in her left axilla. She then underwent mammogram which showed dense breasts with no suspicious lesions in 4/2016. She was then seen by surgery and underwent excisional LN biopsy that showed metastatic high-grade poorly differentiated carcinoma with a tumor size of 2.5 cm. Immunohistochemistry was done for ER/GA receptors that came back both negative. HER-2/boubacar was amplified by FISH. She then underwent MRI of the breast on 9/26/2016 that showed suspicious abnormality with multicentric left sided breast cancer that involved the left lateral breast form the nipple to the chest wall. A PET scan was obtained on 10/5/2016 that showed a hypermetabolic opacity in the left axilla wihtout other pathological activity. It was then recommended that she undergo neoadjuvant chemotherapy with Cytoxan and Doxorubicin that will be followed by Taxol, Herceptin. Patient initiated treatment on 10/11/2016.         Interval History:  Patient is here today for follow-up visit. She has been tolerating treatment with Herceptin without significant side effects. She denies any shortness of breath or cough or wheezing. She denies any nausea or vomiting or diarrhea. She denies any pain.    Review Of Systems:  Constitutional: Negative for fever, chills, and night sweats.  Skin:  negative.  Eyes: negative.  Ears/Nose/Throat: negative.  Respiratory: No shortness of breath, dyspnea on exertion, cough, or hemoptysis.  Cardiovascular: negative.  Gastrointestinal: negative.  Genitourinary: negative.  Musculoskeletal: negative.  Neurologic: negative.  Psychiatric: negative.  Hematologic/Lymphatic/Immunologic: negative.  Endocrine: negative.    All other ROS negative unless mentioned in interval history.    Past medical, social, surgical, and family histories reviewed.    Allergies:  Allergies as of 12/05/2017 - Oskar as Reviewed 12/05/2017   Allergen Reaction Noted     No known drug allergies  07/05/2002       Current Medications:  Current Outpatient Prescriptions   Medication Sig Dispense Refill     hydrochlorothiazide (HYDRODIURIL) 25 MG tablet TAKE ONE TABLET BY MOUTH ONCE DAILY 90 tablet 1     methylPREDNISolone (MEDROL DOSEPAK) 4 MG tablet Follow package instructions 21 tablet 0     oxyCODONE IR (ROXICODONE) 5 MG tablet Take 1 tablet (5 mg) by mouth every 6 hours as needed for pain 20 tablet 0     oxybutynin (DITROPAN) 5 MG tablet TAKE ONE-HALF TABLET BY MOUTH TWICE A DAY 90 tablet 1     venlafaxine (EFFEXOR-XR) 75 MG 24 hr capsule Take 75 mg by mouth daily       ciprofloxacin (CIPRO) 250 MG tablet Take 1 tablet (250 mg) by mouth daily 90 tablet 1     venlafaxine (EFFEXOR XR) 37.5 MG 24 hr capsule Take 1 capsule (37.5 mg) by mouth daily For one week then 2 daily for a week then 3 daily for 3-4 weeks. 90 capsule 1     oxyCODONE-acetaminophen (PERCOCET) 5-325 MG per tablet Take 1 tablet by mouth every 4 hours as needed for pain 20 tablet 0     atorvastatin (LIPITOR) 20 MG tablet Take 1 tablet (20 mg) by mouth daily 90 tablet 1     prochlorperazine (COMPAZINE) 10 MG tablet Take 1 tablet (10 mg) by mouth every 6 hours as needed (Nausea/Vomiting) 30 tablet 3     oxybutynin (DITROPAN) 5 MG tablet TAKE ONE-HALF TABLET BY MOUTH TWICE A DAY 90 tablet 0     LORazepam (ATIVAN) 0.5 MG tablet Take 1 tablet  "(0.5 mg) by mouth every 4 hours as needed (Anxiety, Nausea/Vomiting or Sleep) 15 tablet 0     acetaminophen (TYLENOL EX ST ARTHRITIS PAIN) 500 MG tablet Take 500-1,000 mg by mouth every 6 hours as needed for mild pain       omeprazole (PRILOSEC) 20 MG capsule Take by mouth daily          Physical Exam:  /75 (BP Location: Right arm, Patient Position: Chair, Cuff Size: Adult Regular)  Pulse 81  Temp 97.5  F (36.4  C) (Temporal)  Resp 16  Ht 1.676 m (5' 6\")  Wt 62.1 kg (136 lb 14.4 oz)  SpO2 96%  BMI 22.1 kg/m2  Wt Readings from Last 12 Encounters:   12/05/17 62.1 kg (136 lb 14.4 oz)   11/30/17 61.2 kg (135 lb)   11/14/17 63.2 kg (139 lb 6.4 oz)   10/24/17 65.4 kg (144 lb 3.2 oz)   10/10/17 65.8 kg (145 lb)   10/03/17 65.8 kg (145 lb)   10/03/17 65.8 kg (145 lb)   10/02/17 63.5 kg (140 lb)   09/25/17 65.5 kg (144 lb 8 oz)   09/12/17 64.9 kg (143 lb)   08/23/17 64.7 kg (142 lb 11.2 oz)   08/23/17 64.7 kg (142 lb 11.2 oz)     ECOG performance status: 1  GENERAL APPEARANCE: Healthy, alert and in no acute distress.  HEENT: Sclerae anicteric. PERRLA. Oropharynx without ulcers, lesions, or thrush.  NECK: Supple. No asymmetry or masses.  LYMPHATICS: No palpable cervical, supraclavicular, axillary, or inguinal lymphadenopathy.  RESP: Lungs clear to auscultation bilaterally without rales, rhonchi or wheezes.  CARDIOVASCULAR: Regular rate and rhythm. Normal S1, S2; no S3 or S4. No murmur, gallop, or rub.  ABDOMEN: Soft, nontender. Bowel sounds normal. No palpable organomegaly or masses.  MUSCULOSKELETAL: Extremities without gross deformities noted. No edema of bilateral lower extremities.  SKIN: No suspicious lesions or rashes.  NEURO: Alert and oriented x 3. Cranial nerves II-XII grossly intact.  PSYCHIATRIC: Mentation and affect appear normal.    Laboratory/Imaging Studies:  No visits with results within 2 Week(s) from this visit.  Latest known visit with results is:    Oncology Visit on 10/24/2017   Component " Date Value Ref Range Status     WBC 10/24/2017 5.6  4.0 - 11.0 10e9/L Final     RBC Count 10/24/2017 3.66* 3.8 - 5.2 10e12/L Final     Hemoglobin 10/24/2017 11.7  11.7 - 15.7 g/dL Final     Hematocrit 10/24/2017 37.3  35.0 - 47.0 % Final     MCV 10/24/2017 102* 78 - 100 fl Final     MCH 10/24/2017 32.0  26.5 - 33.0 pg Final     MCHC 10/24/2017 31.4* 31.5 - 36.5 g/dL Final     RDW 10/24/2017 14.7  10.0 - 15.0 % Final     Platelet Count 10/24/2017 294  150 - 450 10e9/L Final     Sodium 10/24/2017 139  133 - 144 mmol/L Final     Potassium 10/24/2017 3.9  3.4 - 5.3 mmol/L Final     Chloride 10/24/2017 102  94 - 109 mmol/L Final     Carbon Dioxide 10/24/2017 27  20 - 32 mmol/L Final     Anion Gap 10/24/2017 10  3 - 14 mmol/L Final     Glucose 10/24/2017 90  70 - 99 mg/dL Final     Urea Nitrogen 10/24/2017 28  7 - 30 mg/dL Final     Creatinine 10/24/2017 0.97  0.52 - 1.04 mg/dL Final     GFR Estimate 10/24/2017 57* >60 mL/min/1.7m2 Final    Non  GFR Calc     GFR Estimate If Black 10/24/2017 69  >60 mL/min/1.7m2 Final    African American GFR Calc     Calcium 10/24/2017 9.6  8.5 - 10.1 mg/dL Final     Bilirubin Total 10/24/2017 0.3  0.2 - 1.3 mg/dL Final     Albumin 10/24/2017 4.4  3.4 - 5.0 g/dL Final     Protein Total 10/24/2017 7.5  6.8 - 8.8 g/dL Final     Alkaline Phosphatase 10/24/2017 59  40 - 150 U/L Final     ALT 10/24/2017 33  0 - 50 U/L Final     AST 10/24/2017 28  0 - 45 U/L Final        Recent Results (from the past 744 hour(s))   XR Lumbar Spine 2/3 Views    Narrative    LUMBAR SPINE TWO VIEWS  11/30/2017 9:56 AM    HISTORY:  Chronic low back pain.    COMPARISON: An MRI on 2/6/2006.    FINDINGS: There is loss of the normal lumbar lordosis. There is a mild  S-shaped curvature of the spine, convex to the right inferiorly.  Vertebral body alignment is otherwise normal. No fracture or osseous  lesion is noted. There is moderate disc height loss along the right  aspect of the L2-L3 disc and along  the left aspect of the L4-L5 disc.  Less extensive disc height loss is seen elsewhere. There is  calcification in the vascular structures. No other soft tissue  pathology is noted.      Impression    IMPRESSION: Degenerative changes as described above.    JAIRO BOWSER MD   MR Lumbar Spine w/o Contrast    Narrative    MR LUMBAR SPINE WITHOUT CONTRAST   11/30/2017 3:10 PM    HISTORY: Bilateral back and left leg pain with tingling and weakness  for 5 months. No specific injury. History of breast cancer.    TECHNIQUE: Sagittal T1 and T2 and STIR, axial proton density and T2  images. Coronal T1-weighted images.    COMPARISON: 2/6/2006.    FINDINGS: Plain films dated 11/30/2017 confirm 5 functional lumbar  vertebral segments. The caudal thecal sac contents appear  intrinsically normal. Straightening of the normal lumbar lordosis is  again seen. There is mild degenerative retrolisthesis L4 on L5 without  change. There is also a subtle degenerative anterolisthesis of L3 on  L4 which is unchanged. Mild curvature lower lumbar spine convex to the  right. 8 mm lateral listhesis L2 on L3 to the left and mild lateral  listhesis L4 on L5 to the right. Benign peridiscal degenerative signal  change at multiple levels. Schmorl's node involving inferior endplate  of L4. No acute bony abnormality.    T12-L1: Normal.    L1-L2: Interval development of mild disc space narrowing and subtle  disc bulging with no disc protrusion or central stenosis. New minimal  left foraminal narrowing from the bulging disc. Right foramen is  unremarkable. Posterior facets are normal.    L2-L3: Interval development of advanced degenerative disc space  narrowing and diffuse disc bulging. This combines with a congenitally  small bony canal, thickening of the ligament flavum and dorsal  epidural fat to cause new mild overall central stenosis. There is also  new moderate right and mild left foraminal stenosis. Posterior facets  are  unremarkable.    L3-L4: Signal loss, mild disc space narrowing and mild diffuse disc  bulging without acute disc protrusion. Moderate posterior facet  degenerative changes with minimal anterolisthesis. These factors  combine with thickening of the ligament flavum and dorsal epidural fat  to cause mild central stenosis. There is right posterolateral annular  fissuring. Mild to moderate right foraminal stenosis from focal disc  bulging or broad-based foraminal disc protrusion. Minimal left  foraminal narrowing. No change.    L4-L5: Signal loss, advanced left-sided degenerative disc space  narrowing and diffuse disc bulging with superimposed small broad-based  right central disc protrusion mildly indenting the anterior thecal sac  and causing bilateral lateral recess stenosis, right greater than  left. This combines with thickening of the ligamentum flavum to cause  borderline-mild central stenosis. Mild right and moderate left  foraminal stenosis. No change.    L5-S1: Signal loss and advanced degenerative disc space narrowing with  chronic diffuse disc bulging and endplate spurring and no acute disc  protrusion. The degenerative disc disease has significantly progressed  since the prior exam and there is now right S1 lateral recess stenosis  (image 33 of series 6). No central stenosis. Severe right foraminal  stenosis has increased. Moderate left foraminal stenosis has also  increased. Mild posterior facet degenerative changes bilaterally.    Paraspinal soft tissues are unremarkable.      Impression    IMPRESSION:   1. Interval development of degenerative disc disease at L1-L2 with  minimal left foraminal stenosis.  2. Interval development of advanced degenerative disc disease at L2-L3  with mild central stenosis, moderate right foraminal stenosis and mild  left foraminal stenosis.  3. Mild central stenosis at L3-L4 related to multiple factors. Mild to  moderate right and minimal left foraminal stenosis at this level  and  no change since the prior exam.  4. Borderline-mild central stenosis at L4-L5 related multiple factors.  Mild right and moderate left foraminal stenosis at this level and no  change since the prior exam.  5. Progressive advanced degenerative disc disease L5-S1 with right S1  lateral recess stenosis and increasing severe right and moderate left  foraminal stenosis.    MIKE CRISTINA MD       Assessment and plan:  (C50.412,  Z17.1) Malignant neoplasm of upper-outer quadrant of left breast in female, estrogen receptor negative (H)  (primary encounter diagnosis)  Patient will conclude anti-HER-2/boubacar therapy with Herceptin. We talked about extended anti-HER-2/boubacar therapy with Neratinib. Neratinib was approved based on the ExteNET trial, a multicentre, randomised, double-blind, placebo-controlled trial of neratinib following adjuvant trastuzumab treatment. In total, 2840 women with early-stage HER2-positive breast cancer and within two years of completing adjuvant trastuzumab were randomised to receive either neratinib (1420 patients) or placebo (1420 patients) for one year. After two years, disease free survival was 94.2% in patients treated with neratinib compared with 91.9% in those receiving placebo (HR 0.66; 95% CI: 0.49, 0.90, p = 0.008). The most common adverse reactions (>5%) were diarrhoea, nausea, abdominal pain, fatigue, vomiting, rash, stomatitis, decreased appetite, muscle spasms, dyspepsia, AST or ALT increase, nail disorder, dry skin, abdominal distention, weight loss, and urinary tract infection. The most common adverse reaction leading to discontinuation was diarrhoea, observed in 16.8% of neratinib-treated patients. Hepatotoxicity or increases in liver transaminases led to drug discontinuation in 1.7% of neratinib-treated patients. The recommended dose is 240 mg (6 tablets) given orally once daily with food, continuously for one year. Antidiarrheal prophylaxis should be initiated with the first  neratinib dose and continued during the first 2 cycles (56 days) of treatment and as needed thereafter. The patient was given handouts about the drug today. I will meet with her again in 1 month's time to discuss further.    (I10) Essential hypertension  Patient's blood pressure has been well controlled. She is in hydrochlorothiazide 25 mg orally daily.    (E78.5) Hyperlipidemia LDL goal <130  Patient currently on Lipitor 20 mg orally daily.    (F43.21) Adjustment disorder with depressed mood  She is currently on Effexor XR 37.5 mg orally daily.      The patient is ready to learn, no apparent learning barriers were identified.  Diagnosis and treatment plans were explained to the patient. The patient expressed understanding of the content. The patient asked appropriate questions. The patient questions were answered to her satisfaction.    Time spent 40 minutes more than 50% of the time in counseling and coordination of care including discussion of management plan, and side effects of medication and follow-up plan    Chart documentation with Dragon Voice recognition Software. Although reviewed after completion, some words and grammatical errors may remain.    Again, thank you for allowing me to participate in the care of your patient.        Sincerely,        Syeda Ferguson MD

## 2017-12-05 NOTE — MR AVS SNAPSHOT
After Visit Summary   12/5/2017    Michaela Dorman    MRN: [id number]           Patient Information     Date Of Birth          1950        Visit Information        Provider Department      12/5/2017 11:30 AM NL INFUSION CHAIR 1 [facility]        Today's Diagnoses     Encounter for antineoplastic chemotherapy    -  1    Malignant neoplasm of upper-outer quadrant of left female breast, unspecified estrogen receptor status (H)        Malignant neoplasm of upper-outer quadrant of left breast in female, estrogen receptor negative (H)          Care Instructions    Pt to ret'n 1 month for port flush.          Follow-ups after your visit        Follow-up notes from your care team     Return if symptoms worsen or fail to improve.      Your next 10 appointments already scheduled     Dec 08, 2017 10:30 AM CST   [facility] Venkat [facility]    [address] [place] [id number] [phone]            Dec 19, 2017 12:30 PM CST   Return Visit with Loren Felipe MD   [facility]    [address] [place] [id number] [phone]            Dec 22, 2017   Procedure with Chuck Chand MD   [facility] ([facility])    [address] [place] [id number] [phone]            Jan 16, 2018  1:00 PM CST   Return Visit with Syeda Ferguson MD   [facility] ([address] [id number] [phone]              Who to contact     If you have questions or need follow up information about today's clinic visit or your schedule please contact [facility] directly at [phone].  Normal or non-critical lab and imaging results will be communicated to you by MyChart, letter or phone within 4 business days after the clinic  has received the results. If you do not hear from us within 7 days, please contact the clinic through [employer] or phone. If you have a critical or abnormal lab result, we will notify you by phone as soon as possible.  Submit refill requests through [employer] or call your pharmacy and they will forward the refill request to us. Please allow 3 business days for your refill to be completed.          Additional Information About Your Visit        [employer] Information     [employer] gives you secure access to your electronic health record. If you see a primary care provider, you can also send messages to your care team and make appointments. If you have questions, please call your primary care clinic.  If you do not have a primary care provider, please call [phone] and they will assist you.        Care EveryWhere ID     This is your Care EveryWhere ID. This could be used by other organizations to access your [place] medical records  [id number]        Your Vitals Were     Pulse                   68            Blood Pressure from Last 3 Encounters:   12/05/17 136/77   12/05/17 128/75   11/30/17 120/64    Weight from Last 3 Encounters:   12/05/17 62.1 kg (136 lb 14.4 oz)   11/30/17 61.2 kg (135 lb)   11/14/17 63.2 kg (139 lb 6.4 oz)              Today, you had the following     No orders found for display       Primary Care Provider Office Phone # Fax #    Phani Tapia PA-C [phone]       [address] [address]        Equal Access to Services     SHIVANI ARTIS : Hadii aad ku hadasho Soomaali, waaxda luqadaha, qaybta kaalmada adeegyada, wale allen . So [facility] [phone].    ATENCIÓN: Si habla español, tiene a ordoñez disposición servicios gratuitos de asistencia lingüística. Llame al [phone].    We comply with applicable federal civil rights laws and [place] laws. We do not discriminate on the basis of race, color, national origin, age,  disability, sex, sexual orientation, or gender identity.            Thank you!     Thank you for choosing [facility]  for your care. Our goal is always to provide you with excellent care. Hearing back from our patients is one way we can continue to improve our services. Please take a few minutes to complete the written survey that you may receive in the mail after your visit with us. Thank you!             Your Updated Medication List - Protect others around you: Learn how to safely use, store and throw away your medicines at www.disposemymeds.org.          This list is accurate as of: 12/5/17  3:31 PM.  Always use your most recent med list.                   Brand Name Dispense Instructions for use Diagnosis    atorvastatin 20 MG tablet    LIPITOR    90 tablet    Take 1 tablet (20 mg) by mouth daily    Hyperlipidemia LDL goal <130       ciprofloxacin 250 MG tablet    CIPRO    90 tablet    Take 1 tablet (250 mg) by mouth daily    Chronic lower urinary tract infection       hydrochlorothiazide 25 MG tablet    HYDRODIURIL    90 tablet    TAKE ONE TABLET BY MOUTH ONCE DAILY    Essential hypertension       LORazepam 0.5 MG tablet    ATIVAN    15 tablet    Take 1 tablet (0.5 mg) by mouth every 4 hours as needed (Anxiety, Nausea/Vomiting or Sleep)    Malignant neoplasm of upper-outer quadrant of left female breast (H), Encounter for antineoplastic chemotherapy       methylPREDNISolone 4 MG tablet    MEDROL DOSEPAK    21 tablet    Follow package instructions    Chronic pain syndrome, Lumbar radiculopathy, Foraminal stenosis of lumbar region, DDD (degenerative disc disease), lumbar, Central stenosis of spinal canal       * oxybutynin 5 MG tablet    DITROPAN    90 tablet    TAKE ONE-HALF TABLET BY MOUTH TWICE A DAY    Dysuria       * oxybutynin 5 MG tablet    DITROPAN    90 tablet    TAKE ONE-HALF TABLET BY MOUTH TWICE A DAY    Dysuria       oxyCODONE IR 5 MG tablet    ROXICODONE    20 tablet     Take 1 tablet (5 mg) by mouth every 6 hours as needed for pain    Chronic pain syndrome, Lumbar radiculopathy       oxyCODONE-acetaminophen 5-325 MG per tablet    PERCOCET    20 tablet    Take 1 tablet by mouth every 4 hours as needed for pain    Post-op pain, S/P reverse total shoulder arthroplasty, right, S/P bilateral mastectomy, Trochanteric bursitis, unspecified laterality       priLOSEC 20 MG CR capsule   Generic drug:  omeprazole      Take by mouth daily        prochlorperazine 10 MG tablet    COMPAZINE    30 tablet    Take 1 tablet (10 mg) by mouth every 6 hours as needed (Nausea/Vomiting)    Encounter for antineoplastic chemotherapy       TYLENOL EX ST ARTHRITIS PAIN 500 MG tablet   Generic drug:  acetaminophen      Take 500-1,000 mg by mouth every 6 hours as needed for mild pain    Dermatitis due to sun       * venlafaxine 75 MG 24 hr capsule    EFFEXOR-XR     Take 75 mg by mouth daily        * venlafaxine 37.5 MG 24 hr capsule    EFFEXOR XR    90 capsule    Take 1 capsule (37.5 mg) by mouth daily For one week then 2 daily for a week then 3 daily for 3-4 weeks.    Adjustment disorder with depressed mood       * Notice:  This list has 4 medication(s) that are the same as other medications prescribed for you. Read the directions carefully, and ask your doctor or other care provider to review them with you.

## 2017-12-05 NOTE — NURSING NOTE
DISCHARGE PLAN:  Next appointments: See patient instruction section  Departure Mode: Ambulatory  Accompanied by:   3 minutes for nursing discharge (face to face time)     Michaela Dorman is here today for Oncology follow up.  Writing nurse seen patient after Medical Oncology appointment to address questions/concerns/coordinate care. Patient to infusion.  Follow up in 6 weeks with plan to start Nerlynx.  Patient provided patient information to review.  Appointments scheduled. See patient instructions and Oncologist's Progress note for further details. Questions and concerns addressed to patient's satisfaction. Patient verbalized and demonstrated understanding of plan.  Contact information provided and patient is encouraged to call with any that arise in the interim of care.    Juan José Hernandez, RN, BSN, OCN   Oncology Care Coordinator RN  [facility]  [phone]  12/5/2017, 4:57 PM

## 2017-12-05 NOTE — MR AVS SNAPSHOT
After Visit Summary   12/5/2017    Michaela Dorman    MRN: [id number]           Patient Information     Date Of Birth          1950        Visit Information        Provider Department      12/5/2017 11:00 AM Syeda Ferguson MD [facility]        Care Instructions      Please follow up with Dr. Ferguson in 6 weeks.  Please review patient information on [employer] and call with any questions or concerns.      Follow Up Date/Time: 01/16/18 at 1:00    If you have any questions or concerns please feel free to call.    If you need to reschedule please call:  Clinic or Lab Appointment - [phone]  Infusion - [phone]  Imaging - [phone]    Juan José Hernandez, RN, BSN, OCN   Oncology Care Coordinator RN  [facility]  [phone]              Follow-ups after your visit        Follow-up notes from your care team     Return in about 4 weeks (around 1/2/2018).      Your next 10 appointments already scheduled     Dec 08, 2017 10:30 AM CST   New Visit with eVnkat Shipley PA-C   [facility] ([facility]    [address] [place] [id number] [phone]            Dec 19, 2017 12:30 PM CST   Return Visit with Loren Felipe MD   [facility] ([facility])    [address] [place] [id number] [phone]            Dec 22, 2017   Procedure with Chuck Chand MD   [facility] ([facility])    [address] [place] [id number] [phone]            Jan 16, 2018  1:00 PM CST   Return Visit with Syeda Ferguson MD   [facility] ([address] [id number] [phone]              Who to contact     If you have questions or need follow up information about today's clinic visit or your schedule please contact [facility] directly at [phone].  Normal or  "non-critical lab and imaging results will be communicated to you by MyChart, letter or phone within 4 business days after the clinic has received the results. If you do not hear from us within 7 days, please contact the clinic through [employer] or phone. If you have a critical or abnormal lab result, we will notify you by phone as soon as possible.  Submit refill requests through [employer] or call your pharmacy and they will forward the refill request to us. Please allow 3 business days for your refill to be completed.          Additional Information About Your Visit        [employer] Information     [employer] gives you secure access to your electronic health record. If you see a primary care provider, you can also send messages to your care team and make appointments. If you have questions, please call your primary care clinic.  If you do not have a primary care provider, please call [phone] and they will assist you.        Care EveryWhere ID     This is your Care EveryWhere ID. This could be used by other organizations to access your [facility] medical records  [id number]        Your Vitals Were     Pulse Temperature Respirations Height Pulse Oximetry BMI (Body Mass Index)    81 97.5  F (36.4  C) (Temporal) 16 1.676 m (5' 6\") 96% 22.1 kg/m2       Blood Pressure from Last 3 Encounters:   12/05/17 128/75   11/30/17 120/64   11/14/17 140/77    Weight from Last 3 Encounters:   12/05/17 62.1 kg (136 lb 14.4 oz)   11/30/17 61.2 kg (135 lb)   11/14/17 63.2 kg (139 lb 6.4 oz)              Today, you had the following     No orders found for display       Primary Care Provider Office Phone # Fax #    Phani Tapia PA-C [phone]       [facility] ([facility]3] [address] [place] [id number]        Equal Access to Services     SHIVANI ARTIS : Hadii [place] Sojose, waaxda luqadaha, qaybta kaalmada ademaeyada, wale pena. So [facility] [phone].    ATENCIÓN: Si jose manuella español, " tiene a ordoñez disposición servicios gratuitos de asistencia lingüística. Shaneka brandt [phone].    We comply with applicable federal civil rights laws and [place] laws. We do not discriminate on the basis of race, color, national origin, age, disability, sex, sexual orientation, or gender identity.            Thank you!     Thank you for choosing [facility]  for your care. Our goal is always to provide you with excellent care. Hearing back from our patients is one way we can continue to improve our services. Please take a few minutes to complete the written survey that you may receive in the mail after your visit with us. Thank you!             Your Updated Medication List - Protect others around you: Learn how to safely use, store and throw away your medicines at www.disposemymeds.org.          This list is accurate as of: 12/5/17 11:41 AM.  Always use your most recent med list.                   Brand Name Dispense Instructions for use Diagnosis    atorvastatin 20 MG tablet    LIPITOR    90 tablet    Take 1 tablet (20 mg) by mouth daily    Hyperlipidemia LDL goal <130       ciprofloxacin 250 MG tablet    CIPRO    90 tablet    Take 1 tablet (250 mg) by mouth daily    Chronic lower urinary tract infection       hydrochlorothiazide 25 MG tablet    HYDRODIURIL    90 tablet    TAKE ONE TABLET BY MOUTH ONCE DAILY    Essential hypertension       LORazepam 0.5 MG tablet    ATIVAN    15 tablet    Take 1 tablet (0.5 mg) by mouth every 4 hours as needed (Anxiety, Nausea/Vomiting or Sleep)    Malignant neoplasm of upper-outer quadrant of left female breast (H), Encounter for antineoplastic chemotherapy       methylPREDNISolone 4 MG tablet    MEDROL DOSEPAK    21 tablet    Follow package instructions    Chronic pain syndrome, Lumbar radiculopathy, Foraminal stenosis of lumbar region, DDD (degenerative disc disease), lumbar, Central stenosis of spinal canal       * oxybutynin 5 MG tablet    DITROPAN    90 tablet     TAKE ONE-HALF TABLET BY MOUTH TWICE A DAY    Dysuria       * oxybutynin 5 MG tablet    DITROPAN    90 tablet    TAKE ONE-HALF TABLET BY MOUTH TWICE A DAY    Dysuria       oxyCODONE IR 5 MG tablet    ROXICODONE    20 tablet    Take 1 tablet (5 mg) by mouth every 6 hours as needed for pain    Chronic pain syndrome, Lumbar radiculopathy       oxyCODONE-acetaminophen 5-325 MG per tablet    PERCOCET    20 tablet    Take 1 tablet by mouth every 4 hours as needed for pain    Post-op pain, S/P reverse total shoulder arthroplasty, right, S/P bilateral mastectomy, Trochanteric bursitis, unspecified laterality       priLOSEC 20 MG CR capsule   Generic drug:  omeprazole      Take by mouth daily        prochlorperazine 10 MG tablet    COMPAZINE    30 tablet    Take 1 tablet (10 mg) by mouth every 6 hours as needed (Nausea/Vomiting)    Encounter for antineoplastic chemotherapy       TYLENOL EX ST ARTHRITIS PAIN 500 MG tablet   Generic drug:  acetaminophen      Take 500-1,000 mg by mouth every 6 hours as needed for mild pain    Dermatitis due to sun       * venlafaxine 75 MG 24 hr capsule    EFFEXOR-XR     Take 75 mg by mouth daily        * venlafaxine 37.5 MG 24 hr capsule    EFFEXOR XR    90 capsule    Take 1 capsule (37.5 mg) by mouth daily For one week then 2 daily for a week then 3 daily for 3-4 weeks.    Adjustment disorder with depressed mood       * Notice:  This list has 4 medication(s) that are the same as other medications prescribed for you. Read the directions carefully, and ask your doctor or other care provider to review them with you.

## 2017-12-05 NOTE — NURSING NOTE
"Oncology Rooming Note    December 5, 2017 11:02 AM   Michaela Dorman is a 67 year old female who presents for:    Chief Complaint   Patient presents with     Oncology Clinic Visit     6 week follow up for Malignant neoplasm of upper-outer quadrant of left breast in female, estrogen receptor negative     Chemotherapy     C15D1 today     Initial Vitals: /75 (BP Location: Right arm, Patient Position: Chair, Cuff Size: Adult Regular)  Pulse 81  Temp 97.5  F (36.4  C) (Temporal)  Resp 16  Ht 1.676 m (5' 6\")  Wt 62.1 kg (136 lb 14.4 oz)  SpO2 96%  BMI 22.1 kg/m2 Estimated body mass index is 22.1 kg/(m^2) as calculated from the following:    Height as of this encounter: 1.676 m (5' 6\").    Weight as of this encounter: 62.1 kg (136 lb 14.4 oz). Body surface area is 1.7 meters squared.  No Pain (0) Comment: Data Unavailable   No LMP recorded. Patient is postmenopausal.  Allergies reviewed: Yes  Medications reviewed: Yes    Medications: Medication refills not needed today.  Pharmacy name entered into EPIC:    [facility] [address] [place] [address] - [address]  [facility] [place], [place] - [address]  [facility] [place], [place] - [address] [facility]     Clinical concerns: None. Dr. Ferguson was notified.    Britta Mims MA              "

## 2017-12-05 NOTE — PATIENT INSTRUCTIONS
Please follow up with Dr. Ferguson in 6 weeks.  Please review patient information on [employer] and call with any questions or concerns.      Follow Up Date/Time: 01/16/18 at 1:00    If you have any questions or concerns please feel free to call.    If you need to reschedule please call:  Clinic or Lab Appointment - [phone]  Infusion - [phone]  Imaging - [phone]    Juan José Hernandez, RN, BSN, OCN   Oncology Care Coordinator RN  [facility]  [phone]

## 2017-12-06 NOTE — ASSESSMENT & PLAN NOTE
Michaela Dorman  initially felt a mass in her left axilla. She then underwent mammogram which showed dense breasts with no suspicious lesions in 4/2016. She was then seen by surgery and underwent excisional LN biopsy that showed metastatic high-grade poorly differentiated carcinoma with a tumor size of 2.5 cm. Immunohistochemistry was done for ER/ND receptors that came back both negative. HER-2/boubacar was amplified by FISH. She then underwent MRI of the breast on 9/26/2016 that showed suspicious abnormality with multicentric left sided breast cancer that involved the left lateral breast form the nipple to the chest wall. A PET scan was obtained on 10/5/2016 that showed a hypermetabolic opacity in the left axilla wihtout other pathological activity. It was then recommended that she undergo neoadjuvant chemotherapy with Cytoxan and Doxorubicin that will be followed by Taxol, Herceptin. Patient initiated treatment on 10/11/2016.

## 2017-12-06 NOTE — PROGRESS NOTES
Hematology/ Oncology Follow-up Visit:  Dec 5, 2017    Reason for Visit:   Chief Complaint   Patient presents with     Oncology Clinic Visit     6 week follow up for Malignant neoplasm of upper-outer quadrant of left breast in female, estrogen receptor negative     Chemotherapy     C15D1 today       Oncologic History:  Malignant neoplasm of upper-outer quadrant of left female breast (H)  Michaela Dorman  initially felt a mass in her left axilla. She then underwent mammogram which showed dense breasts with no suspicious lesions in 4/2016. She was then seen by surgery and underwent excisional LN biopsy that showed metastatic high-grade poorly differentiated carcinoma with a tumor size of 2.5 cm. Immunohistochemistry was done for ER/KY receptors that came back both negative. HER-2/boubacar was amplified by FISH. She then underwent MRI of the breast on 9/26/2016 that showed suspicious abnormality with multicentric left sided breast cancer that involved the left lateral breast form the nipple to the chest wall. A PET scan was obtained on 10/5/2016 that showed a hypermetabolic opacity in the left axilla wihtout other pathological activity. It was then recommended that she undergo neoadjuvant chemotherapy with Cytoxan and Doxorubicin that will be followed by Taxol, Herceptin. Patient initiated treatment on 10/11/2016.         Interval History:  Patient is here today for follow-up visit. She has been tolerating treatment with Herceptin without significant side effects. She denies any shortness of breath or cough or wheezing. She denies any nausea or vomiting or diarrhea. She denies any pain.    Review Of Systems:  Constitutional: Negative for fever, chills, and night sweats.  Skin: negative.  Eyes: negative.  Ears/Nose/Throat: negative.  Respiratory: No shortness of breath, dyspnea on exertion, cough, or hemoptysis.  Cardiovascular: negative.  Gastrointestinal: negative.  Genitourinary: negative.  Musculoskeletal:  negative.  Neurologic: negative.  Psychiatric: negative.  Hematologic/Lymphatic/Immunologic: negative.  Endocrine: negative.    All other ROS negative unless mentioned in interval history.    Past medical, social, surgical, and family histories reviewed.    Allergies:  Allergies as of 12/05/2017 - Oskar as Reviewed 12/05/2017   Allergen Reaction Noted     No known drug allergies  07/05/2002       Current Medications:  Current Outpatient Prescriptions   Medication Sig Dispense Refill     hydrochlorothiazide (HYDRODIURIL) 25 MG tablet TAKE ONE TABLET BY MOUTH ONCE DAILY 90 tablet 1     methylPREDNISolone (MEDROL DOSEPAK) 4 MG tablet Follow package instructions 21 tablet 0     oxyCODONE IR (ROXICODONE) 5 MG tablet Take 1 tablet (5 mg) by mouth every 6 hours as needed for pain 20 tablet 0     oxybutynin (DITROPAN) 5 MG tablet TAKE ONE-HALF TABLET BY MOUTH TWICE A DAY 90 tablet 1     venlafaxine (EFFEXOR-XR) 75 MG 24 hr capsule Take 75 mg by mouth daily       ciprofloxacin (CIPRO) 250 MG tablet Take 1 tablet (250 mg) by mouth daily 90 tablet 1     venlafaxine (EFFEXOR XR) 37.5 MG 24 hr capsule Take 1 capsule (37.5 mg) by mouth daily For one week then 2 daily for a week then 3 daily for 3-4 weeks. 90 capsule 1     oxyCODONE-acetaminophen (PERCOCET) 5-325 MG per tablet Take 1 tablet by mouth every 4 hours as needed for pain 20 tablet 0     atorvastatin (LIPITOR) 20 MG tablet Take 1 tablet (20 mg) by mouth daily 90 tablet 1     prochlorperazine (COMPAZINE) 10 MG tablet Take 1 tablet (10 mg) by mouth every 6 hours as needed (Nausea/Vomiting) 30 tablet 3     oxybutynin (DITROPAN) 5 MG tablet TAKE ONE-HALF TABLET BY MOUTH TWICE A DAY 90 tablet 0     LORazepam (ATIVAN) 0.5 MG tablet Take 1 tablet (0.5 mg) by mouth every 4 hours as needed (Anxiety, Nausea/Vomiting or Sleep) 15 tablet 0     acetaminophen (TYLENOL EX ST ARTHRITIS PAIN) 500 MG tablet Take 500-1,000 mg by mouth every 6 hours as needed for mild pain       omeprazole  "(PRILOSEC) 20 MG capsule Take by mouth daily          Physical Exam:  /75 (BP Location: Right arm, Patient Position: Chair, Cuff Size: Adult Regular)  Pulse 81  Temp 97.5  F (36.4  C) (Temporal)  Resp 16  Ht 1.676 m (5' 6\")  Wt 62.1 kg (136 lb 14.4 oz)  SpO2 96%  BMI 22.1 kg/m2  Wt Readings from Last 12 Encounters:   12/05/17 62.1 kg (136 lb 14.4 oz)   11/30/17 61.2 kg (135 lb)   11/14/17 63.2 kg (139 lb 6.4 oz)   10/24/17 65.4 kg (144 lb 3.2 oz)   10/10/17 65.8 kg (145 lb)   10/03/17 65.8 kg (145 lb)   10/03/17 65.8 kg (145 lb)   10/02/17 63.5 kg (140 lb)   09/25/17 65.5 kg (144 lb 8 oz)   09/12/17 64.9 kg (143 lb)   08/23/17 64.7 kg (142 lb 11.2 oz)   08/23/17 64.7 kg (142 lb 11.2 oz)     ECOG performance status: 1  GENERAL APPEARANCE: Healthy, alert and in no acute distress.  HEENT: Sclerae anicteric. PERRLA. Oropharynx without ulcers, lesions, or thrush.  NECK: Supple. No asymmetry or masses.  LYMPHATICS: No palpable cervical, supraclavicular, axillary, or inguinal lymphadenopathy.  RESP: Lungs clear to auscultation bilaterally without rales, rhonchi or wheezes.  CARDIOVASCULAR: Regular rate and rhythm. Normal S1, S2; no S3 or S4. No murmur, gallop, or rub.  ABDOMEN: Soft, nontender. Bowel sounds normal. No palpable organomegaly or masses.  MUSCULOSKELETAL: Extremities without gross deformities noted. No edema of bilateral lower extremities.  SKIN: No suspicious lesions or rashes.  NEURO: Alert and oriented x 3. Cranial nerves II-XII grossly intact.  PSYCHIATRIC: Mentation and affect appear normal.    Laboratory/Imaging Studies:  No visits with results within 2 Week(s) from this visit.  Latest known visit with results is:    Oncology Visit on 10/24/2017   Component Date Value Ref Range Status     WBC 10/24/2017 5.6  4.0 - 11.0 10e9/L Final     RBC Count 10/24/2017 3.66* 3.8 - 5.2 10e12/L Final     Hemoglobin 10/24/2017 11.7  11.7 - 15.7 g/dL Final     Hematocrit 10/24/2017 37.3  35.0 - 47.0 % Final "     MCV 10/24/2017 102* 78 - 100 fl Final     MCH 10/24/2017 32.0  26.5 - 33.0 pg Final     MCHC 10/24/2017 31.4* 31.5 - 36.5 g/dL Final     RDW 10/24/2017 14.7  10.0 - 15.0 % Final     Platelet Count 10/24/2017 294  150 - 450 10e9/L Final     Sodium 10/24/2017 139  133 - 144 mmol/L Final     Potassium 10/24/2017 3.9  3.4 - 5.3 mmol/L Final     Chloride 10/24/2017 102  94 - 109 mmol/L Final     Carbon Dioxide 10/24/2017 27  20 - 32 mmol/L Final     Anion Gap 10/24/2017 10  3 - 14 mmol/L Final     Glucose 10/24/2017 90  70 - 99 mg/dL Final     Urea Nitrogen 10/24/2017 28  7 - 30 mg/dL Final     Creatinine 10/24/2017 0.97  0.52 - 1.04 mg/dL Final     GFR Estimate 10/24/2017 57* >60 mL/min/1.7m2 Final    Non  GFR Calc     GFR Estimate If Black 10/24/2017 69  >60 mL/min/1.7m2 Final    African American GFR Calc     Calcium 10/24/2017 9.6  8.5 - 10.1 mg/dL Final     Bilirubin Total 10/24/2017 0.3  0.2 - 1.3 mg/dL Final     Albumin 10/24/2017 4.4  3.4 - 5.0 g/dL Final     Protein Total 10/24/2017 7.5  6.8 - 8.8 g/dL Final     Alkaline Phosphatase 10/24/2017 59  40 - 150 U/L Final     ALT 10/24/2017 33  0 - 50 U/L Final     AST 10/24/2017 28  0 - 45 U/L Final        Recent Results (from the past 744 hour(s))   XR Lumbar Spine 2/3 Views    Narrative    LUMBAR SPINE TWO VIEWS  11/30/2017 9:56 AM    HISTORY:  Chronic low back pain.    COMPARISON: An MRI on 2/6/2006.    FINDINGS: There is loss of the normal lumbar lordosis. There is a mild  S-shaped curvature of the spine, convex to the right inferiorly.  Vertebral body alignment is otherwise normal. No fracture or osseous  lesion is noted. There is moderate disc height loss along the right  aspect of the L2-L3 disc and along the left aspect of the L4-L5 disc.  Less extensive disc height loss is seen elsewhere. There is  calcification in the vascular structures. No other soft tissue  pathology is noted.      Impression    IMPRESSION: Degenerative changes as  described above.    JAIRO BOWSER MD   MR Lumbar Spine w/o Contrast    Narrative    MR LUMBAR SPINE WITHOUT CONTRAST   11/30/2017 3:10 PM    HISTORY: Bilateral back and left leg pain with tingling and weakness  for 5 months. No specific injury. History of breast cancer.    TECHNIQUE: Sagittal T1 and T2 and STIR, axial proton density and T2  images. Coronal T1-weighted images.    COMPARISON: 2/6/2006.    FINDINGS: Plain films dated 11/30/2017 confirm 5 functional lumbar  vertebral segments. The caudal thecal sac contents appear  intrinsically normal. Straightening of the normal lumbar lordosis is  again seen. There is mild degenerative retrolisthesis L4 on L5 without  change. There is also a subtle degenerative anterolisthesis of L3 on  L4 which is unchanged. Mild curvature lower lumbar spine convex to the  right. 8 mm lateral listhesis L2 on L3 to the left and mild lateral  listhesis L4 on L5 to the right. Benign peridiscal degenerative signal  change at multiple levels. Schmorl's node involving inferior endplate  of L4. No acute bony abnormality.    T12-L1: Normal.    L1-L2: Interval development of mild disc space narrowing and subtle  disc bulging with no disc protrusion or central stenosis. New minimal  left foraminal narrowing from the bulging disc. Right foramen is  unremarkable. Posterior facets are normal.    L2-L3: Interval development of advanced degenerative disc space  narrowing and diffuse disc bulging. This combines with a congenitally  small bony canal, thickening of the ligament flavum and dorsal  epidural fat to cause new mild overall central stenosis. There is also  new moderate right and mild left foraminal stenosis. Posterior facets  are unremarkable.    L3-L4: Signal loss, mild disc space narrowing and mild diffuse disc  bulging without acute disc protrusion. Moderate posterior facet  degenerative changes with minimal anterolisthesis. These factors  combine with thickening of the ligament  flavum and dorsal epidural fat  to cause mild central stenosis. There is right posterolateral annular  fissuring. Mild to moderate right foraminal stenosis from focal disc  bulging or broad-based foraminal disc protrusion. Minimal left  foraminal narrowing. No change.    L4-L5: Signal loss, advanced left-sided degenerative disc space  narrowing and diffuse disc bulging with superimposed small broad-based  right central disc protrusion mildly indenting the anterior thecal sac  and causing bilateral lateral recess stenosis, right greater than  left. This combines with thickening of the ligamentum flavum to cause  borderline-mild central stenosis. Mild right and moderate left  foraminal stenosis. No change.    L5-S1: Signal loss and advanced degenerative disc space narrowing with  chronic diffuse disc bulging and endplate spurring and no acute disc  protrusion. The degenerative disc disease has significantly progressed  since the prior exam and there is now right S1 lateral recess stenosis  (image 33 of series 6). No central stenosis. Severe right foraminal  stenosis has increased. Moderate left foraminal stenosis has also  increased. Mild posterior facet degenerative changes bilaterally.    Paraspinal soft tissues are unremarkable.      Impression    IMPRESSION:   1. Interval development of degenerative disc disease at L1-L2 with  minimal left foraminal stenosis.  2. Interval development of advanced degenerative disc disease at L2-L3  with mild central stenosis, moderate right foraminal stenosis and mild  left foraminal stenosis.  3. Mild central stenosis at L3-L4 related to multiple factors. Mild to  moderate right and minimal left foraminal stenosis at this level and  no change since the prior exam.  4. Borderline-mild central stenosis at L4-L5 related multiple factors.  Mild right and moderate left foraminal stenosis at this level and no  change since the prior exam.  5. Progressive advanced degenerative disc  disease L5-S1 with right S1  lateral recess stenosis and increasing severe right and moderate left  foraminal stenosis.    MIKE CRISTINA MD       Assessment and plan:  (C50.412,  Z17.1) Malignant neoplasm of upper-outer quadrant of left breast in female, estrogen receptor negative (H)  (primary encounter diagnosis)  Patient will conclude anti-HER-2/boubacar therapy with Herceptin. We talked about extended anti-HER-2/boubacar therapy with Neratinib. Neratinib was approved based on the ExteNET trial, a multicentre, randomised, double-blind, placebo-controlled trial of neratinib following adjuvant trastuzumab treatment. In total, 2840 women with early-stage HER2-positive breast cancer and within two years of completing adjuvant trastuzumab were randomised to receive either neratinib (1420 patients) or placebo (1420 patients) for one year. After two years, disease free survival was 94.2% in patients treated with neratinib compared with 91.9% in those receiving placebo (HR 0.66; 95% CI: 0.49, 0.90, p = 0.008). The most common adverse reactions (>5%) were diarrhoea, nausea, abdominal pain, fatigue, vomiting, rash, stomatitis, decreased appetite, muscle spasms, dyspepsia, AST or ALT increase, nail disorder, dry skin, abdominal distention, weight loss, and urinary tract infection. The most common adverse reaction leading to discontinuation was diarrhoea, observed in 16.8% of neratinib-treated patients. Hepatotoxicity or increases in liver transaminases led to drug discontinuation in 1.7% of neratinib-treated patients. The recommended dose is 240 mg (6 tablets) given orally once daily with food, continuously for one year. Antidiarrheal prophylaxis should be initiated with the first neratinib dose and continued during the first 2 cycles (56 days) of treatment and as needed thereafter. The patient was given handouts about the drug today. I will meet with her again in 1 month's time to discuss further.    (I10) Essential  hypertension  Patient's blood pressure has been well controlled. She is in hydrochlorothiazide 25 mg orally daily.    (E78.5) Hyperlipidemia LDL goal <130  Patient currently on Lipitor 20 mg orally daily.    (F43.21) Adjustment disorder with depressed mood  She is currently on Effexor XR 37.5 mg orally daily.      The patient is ready to learn, no apparent learning barriers were identified.  Diagnosis and treatment plans were explained to the patient. The patient expressed understanding of the content. The patient asked appropriate questions. The patient questions were answered to her satisfaction.    Time spent 40 minutes more than 50% of the time in counseling and coordination of care including discussion of management plan, and side effects of medication and follow-up plan    Chart documentation with Dragon Voice recognition Software. Although reviewed after completion, some words and grammatical errors may remain.

## 2017-12-08 ENCOUNTER — OFFICE VISIT (OUTPATIENT)
Dept: NEUROSURGERY | Facility: CLINIC | Age: 67
End: 2017-12-08
Payer: COMMERCIAL

## 2017-12-08 VITALS — BODY MASS INDEX: 22.43 KG/M2 | TEMPERATURE: 97.5 F | HEIGHT: 66 IN | WEIGHT: 139.6 LBS

## 2017-12-08 DIAGNOSIS — M70.60 TROCHANTERIC BURSITIS, UNSPECIFIED LATERALITY: ICD-10-CM

## 2017-12-08 DIAGNOSIS — Z90.13 S/P BILATERAL MASTECTOMY: ICD-10-CM

## 2017-12-08 DIAGNOSIS — G89.18 POST-OP PAIN: ICD-10-CM

## 2017-12-08 DIAGNOSIS — Z96.611 S/P REVERSE TOTAL SHOULDER ARTHROPLASTY, RIGHT: ICD-10-CM

## 2017-12-08 PROCEDURE — 99203 OFFICE O/P NEW LOW 30 MIN: CPT | Performed by: PHYSICIAN ASSISTANT

## 2017-12-08 RX ORDER — OXYCODONE AND ACETAMINOPHEN 5; 325 MG/1; MG/1
1 TABLET ORAL EVERY 4 HOURS PRN
Qty: 45 TABLET | Refills: 0 | Status: SHIPPED | OUTPATIENT
Start: 2017-12-08 | End: 2018-02-26

## 2017-12-08 ASSESSMENT — PAIN SCALES - GENERAL: PAINLEVEL: MILD PAIN (3)

## 2017-12-08 NOTE — PROGRESS NOTES
"Michaela Dorman is a 67 year old female who presents for:  Chief Complaint   Patient presents with     Neurologic Problem     Low back pain        Initial Vitals:  Temp 97.5  F (36.4  C) (Temporal)  Ht 5' 6\" (1.676 m)  Wt 139 lb 9.6 oz (63.3 kg)  BMI 22.53 kg/m2 Estimated body mass index is 22.53 kg/(m^2) as calculated from the following:    Height as of this encounter: 5' 6\" (1.676 m).    Weight as of this encounter: 139 lb 9.6 oz (63.3 kg).. Body surface area is 1.72 meters squared. BP completed using cuff size: NA (Not Taken)  Mild Pain (3)    Do you feel safe in your environment?  Yes  Do you need any refills today? No    Nursing Comments:         Serena Goodman CMA  '    "

## 2017-12-08 NOTE — PROGRESS NOTES
Dr. Lenny Gomes  [facility]  Neurosurgery Clinic Visit      CC: left leg pain    Primary care Provider: Phani Tapia    Referring Provider: left leg pain      Reason For Visit:   I was asked to consult on the patient for left leg pain.      HPI: Michaela Dorman is a 67 year old female who presents for evaluation of her chief complaint of left leg and low back pain. Symptoms have been present for about 1 year, but have become more persistent over the last 4 months. She describes severe pain that radiates down the lateral aspect of her left thigh and calf, as far as the ankle. She has severe ankle pain. She even received an in injection into the left ankle, with no symptomatic improvement. She has not had any epidural injections. She is unable to stand or participate in any significant activities. She denies bowel or bladder problems, or problems with balance or coordination.    Past Medical History:   Diagnosis Date     Central stenosis of spinal canal 12/1/2017    lumbar      DDD (degenerative disc disease), lumbar 12/1/2017     Depressive disorder, not elsewhere classified      Esophageal reflux      ETOH abuse     in remission     Foraminal stenosis of lumbar region 12/1/2017    Complete lumbar spine left at various degrees and to a lesser extent the right as well.     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    Past Surgical History:   Procedure Laterality Date     ARTHROPLASTY SHOULDER Right 11/17/2015     ARTHROSCOPY KNEE  6/7/2012    Procedure:ARTHROSCOPY KNEE; right knee arthroscopy with partial lateral menisectomy; Surgeon:DAMIÁN MCALLISTER; Location:[facility] OR     C LIGATE FALLOPIAN TUBE       C NONSPECIFIC PROCEDURE  1956    ankle fracture surgery      EXCISE MASS AXILLA Left 9/16/2016    Procedure: EXCISE MASS AXILLA;  Surgeon: Keyon Blanco MD;  Location: [facility] OR     HC REMV CATARACT EXTRACAP,INSERT LENS  6/25/2009    Right eye     INSERT PORT VASCULAR ACCESS Right 10/7/2016    Procedure: INSERT PORT VASCULAR ACCESS;  Surgeon: Keyon Blanco MD;  Location: [facility]     MASTECTOMY SIMPLE BILATERAL Bilateral 2/8/2017    Procedure: MASTECTOMY SIMPLE BILATERAL;  Surgeon: Keyon Blanco MD;  Location: [facility] OR     OPEN REDUCTION INTERNAL FIXATION FOOT Right 3/20/2015    Procedure: OPEN REDUCTION INTERNAL FIXATION FOOT;  Surgeon: Javi Herrmann DPM;  Location: [facility] OR     SHOULDER SURGERY Right 11/2015     Past Surgical History reviewed with patient during visit.    Current Outpatient Prescriptions   Medication     oxyCODONE-acetaminophen (PERCOCET) 5-325 MG per tablet     hydrochlorothiazide (HYDRODIURIL) 25 MG tablet     oxyCODONE IR (ROXICODONE) 5 MG tablet     oxybutynin (DITROPAN) 5 MG tablet     venlafaxine (EFFEXOR-XR) 75 MG 24 hr capsule     ciprofloxacin (CIPRO) 250 MG tablet     venlafaxine (EFFEXOR XR) 37.5 MG 24 hr capsule     atorvastatin (LIPITOR) 20 MG tablet     prochlorperazine (COMPAZINE) 10 MG tablet     oxybutynin (DITROPAN) 5 MG tablet     LORazepam (ATIVAN) 0.5 MG tablet     acetaminophen (TYLENOL EX ST ARTHRITIS PAIN) 500 MG tablet     omeprazole (PRILOSEC) 20 MG capsule     methylPREDNISolone (MEDROL DOSEPAK) 4 MG tablet     No current facility-administered medications for this visit.        Allergies   Allergen Reactions     No Known Drug Allergies        Social History     Social History     Marital status:      Spouse name: ozzie     Number of children: 5     Years of education: N/A     Occupational History      Homemaker     Social History Main Topics     Smoking status: Former Smoker     Packs/day: 3.00     Years: 10.00     Types: Cigarettes     Quit date: 12/1/1979     Smokeless tobacco: Never Used      Comment: approx. 3  "packs daily     Alcohol use 0.0 oz/week     0 Standard drinks or equivalent per week      Comment: daily glass of wine     Drug use: No     Sexual activity: Yes     Partners: Male     Other Topics Concern     Caffeine Concern No     Hobby Hazards No     Sleep Concern No     Stress Concern Yes     Exercise Yes     Seat Belt Yes     Social History Narrative       Family History   Problem Relation Age of Onset     Hypertension Mother      Thyroid Disease Mother      CEREBROVASCULAR DISEASE Mother      Hypertension Father      CEREBROVASCULAR DISEASE Father      CANCER Father      skin     Thyroid Disease Sister      DIABETES Brother      type 2     Depression Sister      Breast Cancer Sister      age 47     CANCER Sister      skin     CANCER Brother      inside nose          ROS: 10 point ROS neg other than the symptoms noted above in the HPI.    Vital Signs: Temp 97.5  F (36.4  C) (Temporal)  Ht 5' 6\" (1.676 m)  Wt 139 lb 9.6 oz (63.3 kg)  BMI 22.53 kg/m2    Examination:  Constitutional:  Alert, well nourished, NAD.  HEENT: Normocephalic, atraumatic.   Pulmonary:  Without shortness of breath, normal effort.   Lymph: no lymphadenopathy to low back or LE.   Integumentary: Skin is free of rashes or lesions.   Cardiovascular:  No pitting edema of BLE.    Psych: Normal affect, no apparent distress    Neurological:  Awake  Alert  Oriented x 3  Speech clear  Cranial nerves II - XII grossly intact  Motor exam   Hip Flexor:                Right: 5/5  Left:  5/5  Hip Adductor:             Right:  5/5  Left:  5/5  Hip Abductor:             Right:  5/5  Left:  5/5  Gastroc Soleus:        Right:  5/5  Left:  5/5  Tib/Ant:                      Right:  5/5  Left:  5/5  EHL:                          Right:  5/5  Left:  5/5       Sensation normal to bilateral upper and lower extremities.    Reflexes are 2+ in the patellar and Achilles. There is no clonus. Downgoing Babinski.    Musculoskeletal:  Gait: Able to stand from a seated " position. Normal non-antalgic, non-myelopathic gait.  Able to heel/toe walk without loss of balance  She is tender palpation of the lumbar paraspinous musculature.    Imaging:   MRI of the lumbar spine was reviewed in the office today. She does have multiple levels of disc degeneration and disc bulging from L2 through S1. She has multiple levels of moderate to severe foraminal stenosis from L4 through S1. Disc degeneration is most significant at L4-5 and L5-S1, where there is loss of disc height and erosion of the endplates.    Assessment/Plan:     Low back pain  Radicular left leg pain  Lumbar disc degeneration multilevel  Lumbar stenosis    Michaela Dorman is a 67 year old female. I did have a discussion with the patient and her  regarding her symptoms. She has developed worsening radicular left leg pain and back pain over the last 12 months, now worsening in the last 3-4 months. She states that her radicular leg pain is much worse and problematic for her than her back pain. I did discuss her MRI findings with her in detail. I did recommend a left L5-S1 transforaminal epidural injection. She is deferring this today. She would like to follow up with Dr. Gomes to discuss surgical intervention. We will get her scheduled accordingly. She voiced agreement and understanding.          Venkat Shipley PA-C  [facility]  [address] [address]    Tel [phone]  Pager [phone]

## 2017-12-08 NOTE — LETTER
"    12/8/2017         RE: Michaela Dorman  [address]  [facility] [address]        Dear Colleague,    Thank you for referring your patient, Michaela Dorman, to the [facility]. Please see a copy of my visit note below.    Michaela Dorman is a 67 year old female who presents for:  Chief Complaint   Patient presents with     Neurologic Problem     Low back pain        Initial Vitals:  Temp 97.5  F (36.4  C) (Temporal)  Ht 5' 6\" (1.676 m)  Wt 139 lb 9.6 oz (63.3 kg)  BMI 22.53 kg/m2 Estimated body mass index is 22.53 kg/(m^2) as calculated from the following:    Height as of this encounter: 5' 6\" (1.676 m).    Weight as of this encounter: 139 lb 9.6 oz (63.3 kg).. Body surface area is 1.72 meters squared. BP completed using cuff size: NA (Not Taken)  Mild Pain (3)    Do you feel safe in your environment?  Yes  Do you need any refills today? No    Nursing Comments:         NATHANIEL Galicia Dr[address]  [facility]  Neurosurgery Clinic Visit      CC: left leg pain    Primary care Provider: Phani Tapia    Referring Provider: left leg pain      Reason For Visit:   I was asked to consult on the patient for left leg pain.      HPI: Michaela Dorman is a 67 year old female who presents for evaluation of her chief complaint of left leg and low back pain. Symptoms have been present for about 1 year, but have become more persistent over the last 4 months. She describes severe pain that radiates down the lateral aspect of her left thigh and calf, as far as the ankle. She has severe ankle pain. She even received an in injection into the left ankle, with no symptomatic improvement. She has not had any epidural injections. She is unable to stand or participate in any significant activities. She denies bowel or bladder problems, or problems with balance or coordination.    Past Medical History:   Diagnosis Date     Central stenosis of spinal canal 12/1/2017    lumbar      " DDD (degenerative disc disease), lumbar 12/1/2017     Depressive disorder, not elsewhere classified      Esophageal reflux      ETOH abuse     in remission     Foraminal stenosis of lumbar region 12/1/2017    Complete lumbar spine left at various degrees and to a lesser extent the right as well.     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    Past Surgical History:   Procedure Laterality Date     ARTHROPLASTY SHOULDER Right 11/17/2015     ARTHROSCOPY KNEE  6/7/2012    Procedure:ARTHROSCOPY KNEE; right knee arthroscopy with partial lateral menisectomy; Surgeon:DAMIÁN MCALLISTER; Location:[facility]     C LIGATE FALLOPIAN TUBE       C NONSPECIFIC PROCEDURE  1956    ankle fracture surgery     EXCISE MASS AXILLA Left 9/16/2016    Procedure: EXCISE MASS AXILLA;  Surgeon: Keyon Blanco MD;  [facility]: [facility]     HC REMV CATARACT EXTRACAP,INSERT LENS  6/25/2009    Right eye     INSERT PORT VASCULAR ACCESS Right 10/7/2016    Procedure: INSERT PORT VASCULAR ACCESS;  Surgeon: Keyon Blanco MD;  [facility]     MASTECTOMY SIMPLE BILATERAL Bilateral 2/8/2017    Procedure: MASTECTOMY SIMPLE BILATERAL;  Surgeon: Keyon Blanco MD;  [facility]     OPEN REDUCTION INTERNAL FIXATION FOOT Right 3/20/2015    Procedure: OPEN REDUCTION INTERNAL FIXATION FOOT;  Surgeon: Javi Herrmann DPM;  Location[facility]     SHOULDER SURGERY Right 11/2015     Past Surgical History reviewed with patient during visit.    Current Outpatient Prescriptions   Medication     oxyCODONE-acetaminophen (PERCOCET) 5-325 MG per tablet     hydrochlorothiazide (HYDRODIURIL) 25 MG tablet     oxyCODONE IR (ROXICODONE) 5 MG tablet     oxybutynin (DITROPAN) 5 MG tablet     venlafaxine (EFFEXOR-XR) 75 MG 24 hr capsule  "    ciprofloxacin (CIPRO) 250 MG tablet     venlafaxine (EFFEXOR XR) 37.5 MG 24 hr capsule     atorvastatin (LIPITOR) 20 MG tablet     prochlorperazine (COMPAZINE) 10 MG tablet     oxybutynin (DITROPAN) 5 MG tablet     LORazepam (ATIVAN) 0.5 MG tablet     acetaminophen (TYLENOL EX ST ARTHRITIS PAIN) 500 MG tablet     omeprazole (PRILOSEC) 20 MG capsule     methylPREDNISolone (MEDROL DOSEPAK) 4 MG tablet     No current facility-administered medications for this visit.        Allergies   Allergen Reactions     No Known Drug Allergies        Social History     Social History     Marital status:      Spouse name: ozzie     Number of children: 5     Years of education: N/A     Occupational History      Homemaker     Social History Main Topics     Smoking status: Former Smoker     Packs/day: 3.00     Years: 10.00     Types: Cigarettes     Quit date: 12/1/1979     Smokeless tobacco: Never Used      Comment: approx. 3 packs daily     Alcohol use 0.0 oz/week     0 Standard drinks or equivalent per week      Comment: daily glass of wine     Drug use: No     Sexual activity: Yes     Partners: Male     Other Topics Concern     Caffeine Concern No     Hobby Hazards No     Sleep Concern No     Stress Concern Yes     Exercise Yes     Seat Belt Yes     Social History Narrative       Family History   Problem Relation Age of Onset     Hypertension Mother      Thyroid Disease Mother      CEREBROVASCULAR DISEASE Mother      Hypertension Father      CEREBROVASCULAR DISEASE Father      CANCER Father      skin     Thyroid Disease Sister      DIABETES Brother      type 2     Depression Sister      Breast Cancer Sister      age 47     CANCER Sister      skin     CANCER Brother      inside nose          ROS: 10 point ROS neg other than the symptoms noted above in the HPI.    Vital Signs: Temp 97.5  F (36.4  C) (Temporal)  Ht 5' 6\" (1.676 m)  Wt 139 lb 9.6 oz (63.3 kg)  BMI 22.53 kg/m2    Examination:  Constitutional:  Alert, well " nourished, NAD.  HEENT: Normocephalic, atraumatic.   Pulmonary:  Without shortness of breath, normal effort.   Lymph: no lymphadenopathy to low back or LE.   Integumentary: Skin is free of rashes or lesions.   Cardiovascular:  No pitting edema of BLE.    Psych: Normal affect, no apparent distress    Neurological:  Awake  Alert  Oriented x 3  Speech clear  Cranial nerves II - XII grossly intact  Motor exam   Hip Flexor:                Right: 5/5  Left:  5/5  Hip Adductor:             Right:  5/5  Left:  5/5  Hip Abductor:             Right:  5/5  Left:  5/5  Gastroc Soleus:        Right:  5/5  Left:  5/5  Tib/Ant:                      Right:  5/5  Left:  5/5  EHL:                          Right:  5/5  Left:  5/5       Sensation normal to bilateral upper and lower extremities.    Reflexes are 2+ in the patellar and Achilles. There is no clonus. Downgoing Babinski.    Musculoskeletal:  Gait: Able to stand from a seated position. Normal non-antalgic, non-myelopathic gait.  Able to heel/toe walk without loss of balance  She is tender palpation of the lumbar paraspinous musculature.    Imaging:   MRI of the lumbar spine was reviewed in the office today. She does have multiple levels of disc degeneration and disc bulging from L2 through S1. She has multiple levels of moderate to severe foraminal stenosis from L4 through S1. Disc degeneration is most significant at L4-5 and L5-S1, where there is loss of disc height and erosion of the endplates.    Assessment/Plan:     Low back pain  Radicular left leg pain  Lumbar disc degeneration multilevel  Lumbar stenosis    Michaela Dorman is a 67 year old female. I did have a discussion with the patient and her  regarding her symptoms. She has developed worsening radicular left leg pain and back pain over the last 12 months, now worsening in the last 3-4 months. She states that her radicular leg pain is much worse and problematic for her than her back pain. I did discuss her  MRI findings with her in detail. I did recommend a left L5-S1 transforaminal epidural injection. She is deferring this today. She would like to follow up with Dr. Gomes to discuss surgical intervention. We will get her scheduled accordingly. She voiced agreement and understanding.          Venkat Shipley PA-C  [facility]  [address] [address]    Tel [phone]  Pager [phone]      Again, thank you for allowing me to participate in the care of your patient.        Sincerely,        Venkat Shipley PA-C

## 2017-12-08 NOTE — MR AVS SNAPSHOT
After Visit Summary   12/8/2017    Michaela Dorman    MRN: [id number]           Patient Information     Date Of Birth          1950        Visit Information        Provider Department      12/8/2017 10:30 AM Venkat Shipley PA-C [facility]        Today's Diagnoses     Post-op pain        S/P reverse total shoulder arthroplasty, right        S/P bilateral mastectomy        Trochanteric bursitis, unspecified laterality           Follow-ups after your visit        Follow-up notes from your care team     Return for next avail with Dr. Gomes.      Your next 10 appointments already scheduled     Dec 19, 2017 12:30 PM CST   [facility] Visit with Loren Felipe MD   [facility]    [address] [place] [id number]   [phone]            Dec 22, 2017   Procedure with Chuck Chand MD   [facility] ([facility])    [address] [place] [id number] [phone]            Jan 16, 2018  1:00 PM CST   Return Visit with Syeda Ferguson MD   [facility] ([facility])    [address] [place] [id number] [phone]            Jan 16, 2018  1:30 PM CST   Level 1 with NL INFUSION CHAIR 3   [facility] ([facility])    [address] [place] [address]  [place] [address] [phone]              Who to contact     If you have questions or need follow up information about today's clinic visit or your schedule please contact [facility] directly at [phone].  Normal or non-critical lab and imaging results will be communicated to you by MyChart, letter or phone within 4 business days after the clinic has received the results. If you do not hear from us within 7 days, please contact the clinic through MyChart or phone. If you have a critical or abnormal lab result, we will notify you by phone as soon  "as possible.  Submit refill requests through [employer] or call your pharmacy and they will forward the refill request to us. Please allow 3 business days for your refill to be completed.          Additional Information About Your Visit        [employer] Information     [employer] gives you secure access to your electronic health record. If you see a primary care provider, you can also send messages to your care team and make appointments. If you have questions, please call your primary care clinic.  If you do not have a primary care provider, please call [phone] and they will assist you.        Care EveryWhere ID     This is your Care EveryWhere ID. This could be used by other organizations to access your [facility] medical records  [id number]        Your Vitals Were     Temperature Height BMI (Body Mass Index)             97.5  F (36.4  C) (Temporal) 5' 6\" (1.676 m) 22.53 kg/m2          Blood Pressure from Last 3 Encounters:   12/05/17 136/77   12/05/17 128/75   11/30/17 120/64    Weight from Last 3 Encounters:   12/08/17 139 lb 9.6 oz (63.3 kg)   12/05/17 136 lb 14.4 oz (62.1 kg)   11/30/17 135 lb (61.2 kg)              Today, you had the following     No orders found for display         Where to get your medicines      Some of these will need a paper prescription and others can be bought over the counter.  Ask your nurse if you have questions.     Bring a paper prescription for each of these medications     oxyCODONE-acetaminophen 5-325 MG per tablet          Primary Care Provider Office Phone # Fax #    Phani Tapia PA-C [phone]       [address] [address]        Equal Access to Services     SHIVANI ARTIS : Hadii santa Manzano, waaxda delma, qaybta wale [facility]oses. So [facility] [phone].    ATENCIÓN: Si habla español, tiene a ordoñez disposición servicios gratuitos de asistencia lingüística. Llame al " [phone].    We comply with applicable federal civil rights laws and [place] laws. We do not discriminate on the basis of race, color, national origin, age, disability, sex, sexual orientation, or gender identity.            Thank you!     Thank you for choosing [facility]  for your care. Our goal is always to provide you with excellent care. Hearing back from our patients is one way we can continue to improve our services. Please take a few minutes to complete the written survey that you may receive in the mail after your visit with us. Thank you!             Your Updated Medication List - Protect others around you: Learn how to safely use, store and throw away your medicines at www.disposemymeds.org.          This list is accurate as of: 12/8/17 10:45 AM.  Always use your most recent med list.                   Brand Name Dispense Instructions for use Diagnosis    atorvastatin 20 MG tablet    LIPITOR    90 tablet    Take 1 tablet (20 mg) by mouth daily    Hyperlipidemia LDL goal <130       ciprofloxacin 250 MG tablet    CIPRO    90 tablet    Take 1 tablet (250 mg) by mouth daily    Chronic lower urinary tract infection       hydrochlorothiazide 25 MG tablet    HYDRODIURIL    90 tablet    TAKE ONE TABLET BY MOUTH ONCE DAILY    Essential hypertension       LORazepam 0.5 MG tablet    ATIVAN    15 tablet    Take 1 tablet (0.5 mg) by mouth every 4 hours as needed (Anxiety, Nausea/Vomiting or Sleep)    Malignant neoplasm of upper-outer quadrant of left female breast (H), Encounter for antineoplastic chemotherapy       methylPREDNISolone 4 MG tablet    MEDROL DOSEPAK    21 tablet    Follow package instructions    Chronic pain syndrome, Lumbar radiculopathy, Foraminal stenosis of lumbar region, DDD (degenerative disc disease), lumbar, Central stenosis of spinal canal       * oxybutynin 5 MG tablet    DITROPAN    90 tablet    TAKE ONE-HALF TABLET BY MOUTH TWICE A DAY    Dysuria       * oxybutynin 5 MG  tablet    DITROPAN    90 tablet    TAKE ONE-HALF TABLET BY MOUTH TWICE A DAY    Dysuria       oxyCODONE IR 5 MG tablet    ROXICODONE    20 tablet    Take 1 tablet (5 mg) by mouth every 6 hours as needed for pain    Chronic pain syndrome, Lumbar radiculopathy       oxyCODONE-acetaminophen 5-325 MG per tablet    PERCOCET    45 tablet    Take 1 tablet by mouth every 4 hours as needed for pain    Post-op pain, S/P reverse total shoulder arthroplasty, right, S/P bilateral mastectomy, Trochanteric bursitis, unspecified laterality       priLOSEC 20 MG CR capsule   Generic drug:  omeprazole      Take by mouth daily        prochlorperazine 10 MG tablet    COMPAZINE    30 tablet    Take 1 tablet (10 mg) by mouth every 6 hours as needed (Nausea/Vomiting)    Encounter for antineoplastic chemotherapy       TYLENOL EX ST ARTHRITIS PAIN 500 MG tablet   Generic drug:  acetaminophen      Take 500-1,000 mg by mouth every 6 hours as needed for mild pain    Dermatitis due to sun       * venlafaxine 75 MG 24 hr capsule    EFFEXOR-XR     Take 75 mg by mouth daily        * venlafaxine 37.5 MG 24 hr capsule    EFFEXOR XR    90 capsule    Take 1 capsule (37.5 mg) by mouth daily For one week then 2 daily for a week then 3 daily for 3-4 weeks.    Adjustment disorder with depressed mood       * Notice:  This list has 4 medication(s) that are the same as other medications prescribed for you. Read the directions carefully, and ask your doctor or other care provider to review them with you.

## 2017-12-11 ENCOUNTER — RADIANT APPOINTMENT (OUTPATIENT)
Dept: GENERAL RADIOLOGY | Facility: CLINIC | Age: 67
End: 2017-12-11
Attending: NEUROLOGICAL SURGERY
Payer: COMMERCIAL

## 2017-12-11 ENCOUNTER — TELEPHONE (OUTPATIENT)
Dept: PALLIATIVE MEDICINE | Facility: CLINIC | Age: 67
End: 2017-12-11

## 2017-12-11 ENCOUNTER — MYC MEDICAL ADVICE (OUTPATIENT)
Dept: FAMILY MEDICINE | Facility: OTHER | Age: 67
End: 2017-12-11

## 2017-12-11 ENCOUNTER — OFFICE VISIT (OUTPATIENT)
Dept: NEUROSURGERY | Facility: CLINIC | Age: 67
End: 2017-12-11
Attending: PHYSICIAN ASSISTANT
Payer: COMMERCIAL

## 2017-12-11 VITALS
DIASTOLIC BLOOD PRESSURE: 71 MMHG | OXYGEN SATURATION: 98 % | SYSTOLIC BLOOD PRESSURE: 113 MMHG | WEIGHT: 138 LBS | TEMPERATURE: 98 F | BODY MASS INDEX: 22.27 KG/M2 | HEART RATE: 78 BPM

## 2017-12-11 DIAGNOSIS — M47.26 OTHER SPONDYLOSIS WITH RADICULOPATHY, LUMBAR REGION: ICD-10-CM

## 2017-12-11 DIAGNOSIS — M47.26 OTHER SPONDYLOSIS WITH RADICULOPATHY, LUMBAR REGION: Primary | ICD-10-CM

## 2017-12-11 PROCEDURE — 99211 OFF/OP EST MAY X REQ PHY/QHP: CPT | Performed by: NEUROLOGICAL SURGERY

## 2017-12-11 PROCEDURE — 99214 OFFICE O/P EST MOD 30 MIN: CPT | Performed by: NEUROLOGICAL SURGERY

## 2017-12-11 PROCEDURE — 72082 X-RAY EXAM ENTIRE SPI 2/3 VW: CPT | Mod: TC

## 2017-12-11 NOTE — PROGRESS NOTES
Michaela Dorman is a 67 year old female who presents for evaluation of her chief complaint of left leg and low back pain. Symptoms have been present for about 1 year, but have become more persistent over the last 4 months. She describes severe pain that radiates down the lateral aspect of her left thigh and calf, as far as the ankle. She has severe ankle pain. She even received an in injection into the left ankle, with no symptomatic improvement. She has not had any epidural injections. She is unable to stand or participate in any significant activities. She denies bowel or bladder problems, or problems with balance or coordination.    She returns today for follow-up.  Continues to have 8 out of 10, sharp pain, from the left hip, to the posterior thigh, calf, ankle and lateral foot.  Worse with walking or activity.  Has had the pain for the last 4 months.  X-rays demonstrate significant degeneration at L4-5 and L5-S1 with loss of lumbar lordosis.  MRI demonstrates multilevel significant central and foraminal stenosis, with severe left L5 foraminal stenosis.  Has undergone physical therapy without lasting improvement.    Past Medical History:   Diagnosis Date     Central stenosis of spinal canal 12/1/2017    lumbar      DDD (degenerative disc disease), lumbar 12/1/2017     Depressive disorder, not elsewhere classified      Esophageal reflux      ETOH abuse     in remission     Foraminal stenosis of lumbar region 12/1/2017    Complete lumbar spine left at various degrees and to a lesser extent the right as well.     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    Past Surgical History:   Procedure Laterality Date     ARTHROPLASTY SHOULDER Right 11/17/2015     ARTHROSCOPY KNEE   6/7/2012    Procedure:ARTHROSCOPY KNEE; right knee arthroscopy with partial lateral menisectomy; Surgeon:DAMIÁN MCALLISTER; Location:[facility]     C LIGATE FALLOPIAN TUBE       C NONSPECIFIC PROCEDURE  1956    ankle fracture surgery     EXCISE MASS AXILLA Left 9/16/2016    Procedure: EXCISE MASS AXILLA;  Surgeon: Keyon Blanco MD;  [facility]     HC REMV CATARACT EXTRACAP,INSERT LENS  6/25/2009    Right eye     INSERT PORT VASCULAR ACCESS Right 10/7/2016    Procedure: INSERT PORT VASCULAR ACCESS;  Surgeon: Keyon Blanco MD;  [facility]     MASTECTOMY SIMPLE BILATERAL Bilateral 2/8/2017    Procedure: MASTECTOMY SIMPLE BILATERAL;  Surgeon: Keyon Blanco MD;  [facility]     OPEN REDUCTION INTERNAL FIXATION FOOT Right 3/20/2015    Procedure: OPEN REDUCTION INTERNAL FIXATION FOOT;  Surgeon: Javi Herrmann DPM;  Location[facility]     SHOULDER SURGERY Right 11/2015     Past Surgical History reviewed with patient during visit.    Current Outpatient Prescriptions   Medication     oxyCODONE-acetaminophen (PERCOCET) 5-325 MG per tablet     hydrochlorothiazide (HYDRODIURIL) 25 MG tablet     methylPREDNISolone (MEDROL DOSEPAK) 4 MG tablet     oxyCODONE IR (ROXICODONE) 5 MG tablet     oxybutynin (DITROPAN) 5 MG tablet     venlafaxine (EFFEXOR-XR) 75 MG 24 hr capsule     ciprofloxacin (CIPRO) 250 MG tablet     atorvastatin (LIPITOR) 20 MG tablet     prochlorperazine (COMPAZINE) 10 MG tablet     oxybutynin (DITROPAN) 5 MG tablet     LORazepam (ATIVAN) 0.5 MG tablet     acetaminophen (TYLENOL EX ST ARTHRITIS PAIN) 500 MG tablet     omeprazole (PRILOSEC) 20 MG capsule     venlafaxine (EFFEXOR XR) 37.5 MG 24 hr capsule     No current facility-administered medications for this visit.        Allergies   Allergen Reactions     No Known Drug Allergies        Social History     Social History     Marital status:      Spouse name: ozzie     Number of children: 5     Years of education: N/A     Occupational  History      Homemaker     Social History Main Topics     Smoking status: Former Smoker     Packs/day: 3.00     Years: 10.00     Types: Cigarettes     Quit date: 12/1/1979     Smokeless tobacco: Never Used      Comment: approx. 3 packs daily     Alcohol use 0.0 oz/week     0 Standard drinks or equivalent per week      Comment: daily glass of wine     Drug use: No     Sexual activity: Yes     Partners: Male     Other Topics Concern     Caffeine Concern No     Hobby Hazards No     Sleep Concern No     Stress Concern Yes     Exercise Yes     Seat Belt Yes     Social History Narrative       Family History   Problem Relation Age of Onset     Hypertension Mother      Thyroid Disease Mother      CEREBROVASCULAR DISEASE Mother      Hypertension Father      CEREBROVASCULAR DISEASE Father      CANCER Father      skin     Thyroid Disease Sister      DIABETES Brother      type 2     Depression Sister      Breast Cancer Sister      age 47     CANCER Sister      skin     CANCER Brother      inside nose          ROS: 10 point ROS neg other than the symptoms noted above in the HPI.    Vital Signs: /71 (BP Location: Right arm, Cuff Size: Adult Regular)  Pulse 78  Temp 98  F (36.7  C) (Oral)  Wt 62.6 kg (138 lb)  SpO2 98%  Breastfeeding? No  BMI 22.27 kg/m2    Examination:  Constitutional:  Alert, well nourished, NAD.  HEENT: Normocephalic, atraumatic.   Pulmonary:  Without shortness of breath, normal effort.   Lymph: no lymphadenopathy to low back or LE.   Integumentary: Skin is free of rashes or lesions.   Cardiovascular:  No pitting edema of BLE.    Psych: Normal affect, no apparent distress    Neurological:  Awake  Alert  Oriented x 3  Speech clear  Cranial nerves II - XII grossly intact  Motor exam   Hip Flexor:                Right: 5/5  Left:  5/5  Hip Adductor:             Right:  5/5  Left:  5/5  Hip Abductor:             Right:  5/5  Left:  5/5  Gastroc Soleus:        Right:  5/5  Left:  5/5  Tib/Ant:                       Right:  5/5  Left:  5/5  EHL:                          Right:  5/5  Left:  5/5       Sensation normal to bilateral upper and lower extremities.    Reflexes are 2+ in the patellar and Achilles. There is no clonus. Downgoing Babinski.    Musculoskeletal:  Gait: Able to stand from a seated position. Normal non-antalgic, non-myelopathic gait.  Able to heel/toe walk without loss of balance  She is tender palpation of the lumbar paraspinous musculature.    Imaging:   MRI of the lumbar spine was reviewed in the office today. She does have multiple levels of disc degeneration and disc bulging from L2 through S1. She has multiple levels of moderate to severe foraminal stenosis from L4 through S1. Disc degeneration is most significant at L4-5 and L5-S1, where there is loss of disc height and erosion of the endplates.    Assessment/Plan:     Low back pain  Radicular left leg pain  Lumbar disc degeneration multilevel  Lumbar stenosis    Having back pain and severe left leg pain  Has done physical therapy without improvement  We will send her for left L5-S1 transforaminal epidural steroid injection  We will also obtain scoliosis x-rays  She will follow-up for further discussion of her symptoms continue

## 2017-12-11 NOTE — PATIENT INSTRUCTIONS
1. Injection at [facility]. They will call you to schedule.  2. Please obtain xrays. You may call to schedule this at [phone].   3. We will call you with the xray results. Please call our clinic if no relief after injection.       Please call our clinic with any questions or concerns: [phone]

## 2017-12-11 NOTE — NURSING NOTE
"Michaela Dorman is a 67 year old female who presents for:  Chief Complaint   Patient presents with     Neurologic Problem     consult per LG lumbar radiculopathy- surgical intervention        Initial Vitals:  There were no vitals taken for this visit. Estimated body mass index is 22.53 kg/(m^2) as calculated from the following:    Height as of 12/8/17: 5' 6\" (1.676 m).    Weight as of 12/8/17: 139 lb 9.6 oz (63.3 kg).. There is no height or weight on file to calculate BSA. BP completed using cuff size: regular  Data Unavailable    Do you feel safe in your environment?  Yes  Do you need any refills today? No    Nursing Comments: consult per LG lumbar radiculopathy- surgical intervention.  Patient rates her pain today as 4-5 with weakness and pain to the left ankle      5 min. nursing intake time  Darlene Gabriel CMA, AAS      Discharge plan:   See providers dictation   2 min. nursing discharge time  Darlene Gabriel CMA, AAS       "

## 2017-12-11 NOTE — LETTER
12/11/2017         RE: Michaela Dorman  [address]  [facility] [address]        Dear Colleague,    Thank you for referring your patient, Michaela Dorman, to the [facility]ERY [facility]INIC. Please see a copy of my visit note below.     Michaela Dorman is a 67 year old female who presents for evaluation of her chief complaint of left leg and low back pain. Symptoms have been present for about 1 year, but have become more persistent over the last 4 months. She describes severe pain that radiates down the lateral aspect of her left thigh and calf, as far as the ankle. She has severe ankle pain. She even received an in injection into the left ankle, with no symptomatic improvement. She has not had any epidural injections. She is unable to stand or participate in any significant activities. She denies bowel or bladder problems, or problems with balance or coordination.    She returns today for follow-up.  Continues to have 8 out of 10, sharp pain, from the left hip, to the posterior thigh, calf, ankle and lateral foot.  Worse with walking or activity.  Has had the pain for the last 4 months.  X-rays demonstrate significant degeneration at L4-5 and L5-S1 with loss of lumbar lordosis.  MRI demonstrates multilevel significant central and foraminal stenosis, with severe left L5 foraminal stenosis.  Has undergone physical therapy without lasting improvement.    Past Medical History:   Diagnosis Date     Central stenosis of spinal canal 12/1/2017    lumbar      DDD (degenerative disc disease), lumbar 12/1/2017     Depressive disorder, not elsewhere classified      Esophageal reflux      ETOH abuse     in remission     Foraminal stenosis of lumbar region 12/1/2017    Complete lumbar spine left at various degrees and to a lesser extent the right as well.     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    Past Surgical History:   Procedure Laterality Date     ARTHROPLASTY SHOULDER Right 11/17/2015     ARTHROSCOPY KNEE  6/7/2012    Procedure:ARTHROSCOPY KNEE; right knee arthroscopy with partial lateral menisectomy; Surgeon:DAMIÁN MCALLISTER; Location:[facility] OR     C LIGATE FALLOPIAN TUBE       C NONSPECIFIC PROCEDURE  1956    ankle fracture surgery     EXCISE MASS AXILLA Left 9/16/2016    Procedure: EXCISE MASS AXILLA;  Surgeon: Keyon Blanco MD;  Location: [facility] OR     HC REMV CATARACT EXTRACAP,INSERT LENS  6/25/2009    Right eye     INSERT PORT VASCULAR ACCESS Right 10/7/2016    Procedure: INSERT PORT VASCULAR ACCESS;  Surgeon: Keyon Blanco MD;  Location: [facility]     MASTECTOMY SIMPLE BILATERAL Bilateral 2/8/2017    Procedure: MASTECTOMY SIMPLE BILATERAL;  Surgeon: Keyon Blanco MD;  Location: [facility]     OPEN REDUCTION INTERNAL FIXATION FOOT Right 3/20/2015    Procedure: OPEN REDUCTION INTERNAL FIXATION FOOT;  Surgeon: Javi Herrmann DPM;  Location: [facility] OR     SHOULDER SURGERY Right 11/2015     Past Surgical History reviewed with patient during visit.    Current Outpatient Prescriptions   Medication     oxyCODONE-acetaminophen (PERCOCET) 5-325 MG per tablet     hydrochlorothiazide (HYDRODIURIL) 25 MG tablet     methylPREDNISolone (MEDROL DOSEPAK) 4 MG tablet     oxyCODONE IR (ROXICODONE) 5 MG tablet     oxybutynin (DITROPAN) 5 MG tablet     venlafaxine (EFFEXOR-XR) 75 MG 24 hr capsule     ciprofloxacin (CIPRO) 250 MG tablet     atorvastatin (LIPITOR) 20 MG tablet     prochlorperazine (COMPAZINE) 10 MG tablet     oxybutynin (DITROPAN) 5 MG tablet     LORazepam (ATIVAN) 0.5 MG tablet     acetaminophen (TYLENOL EX ST ARTHRITIS PAIN) 500 MG tablet     omeprazole (PRILOSEC) 20 MG capsule     venlafaxine (EFFEXOR XR) 37.5 MG 24 hr capsule     No current facility-administered  medications for this visit.        Allergies   Allergen Reactions     No Known Drug Allergies        Social History     Social History     Marital status:      Spouse name: ozzie     Number of children: 5     Years of education: N/A     Occupational History      Homemaker     Social History Main Topics     Smoking status: Former Smoker     Packs/day: 3.00     Years: 10.00     Types: Cigarettes     Quit date: 12/1/1979     Smokeless tobacco: Never Used      Comment: approx. 3 packs daily     Alcohol use 0.0 oz/week     0 Standard drinks or equivalent per week      Comment: daily glass of wine     Drug use: No     Sexual activity: Yes     Partners: Male     Other Topics Concern     Caffeine Concern No     Hobby Hazards No     Sleep Concern No     Stress Concern Yes     Exercise Yes     Seat Belt Yes     Social History Narrative       Family History   Problem Relation Age of Onset     Hypertension Mother      Thyroid Disease Mother      CEREBROVASCULAR DISEASE Mother      Hypertension Father      CEREBROVASCULAR DISEASE Father      CANCER Father      skin     Thyroid Disease Sister      DIABETES Brother      type 2     Depression Sister      Breast Cancer Sister      age 47     CANCER Sister      skin     CANCER Brother      inside nose          ROS: 10 point ROS neg other than the symptoms noted above in the HPI.    Vital Signs: /71 (BP Location: Right arm, Cuff Size: Adult Regular)  Pulse 78  Temp 98  F (36.7  C) (Oral)  Wt 62.6 kg (138 lb)  SpO2 98%  Breastfeeding? No  BMI 22.27 kg/m2    Examination:  Constitutional:  Alert, well nourished, NAD.  HEENT: Normocephalic, atraumatic.   Pulmonary:  Without shortness of breath, normal effort.   Lymph: no lymphadenopathy to low back or LE.   Integumentary: Skin is free of rashes or lesions.   Cardiovascular:  No pitting edema of BLE.    Psych: Normal affect, no apparent distress    Neurological:  Awake  Alert  Oriented x 3  Speech clear  Cranial nerves  II - XII grossly intact  Motor exam   Hip Flexor:                Right: 5/5  Left:  5/5  Hip Adductor:             Right:  5/5  Left:  5/5  Hip Abductor:             Right:  5/5  Left:  5/5  Gastroc Soleus:        Right:  5/5  Left:  5/5  Tib/Ant:                      Right:  5/5  Left:  5/5  EHL:                          Right:  5/5  Left:  5/5       Sensation normal to bilateral upper and lower extremities.    Reflexes are 2+ in the patellar and Achilles. There is no clonus. Downgoing Babinski.    Musculoskeletal:  Gait: Able to stand from a seated position. Normal non-antalgic, non-myelopathic gait.  Able to heel/toe walk without loss of balance  She is tender palpation of the lumbar paraspinous musculature.    Imaging:   MRI of the lumbar spine was reviewed in the office today. She does have multiple levels of disc degeneration and disc bulging from L2 through S1. She has multiple levels of moderate to severe foraminal stenosis from L4 through S1. Disc degeneration is most significant at L4-5 and L5-S1, where there is loss of disc height and erosion of the endplates.    Assessment/Plan:     Low back pain  Radicular left leg pain  Lumbar disc degeneration multilevel  Lumbar stenosis    Having back pain and severe left leg pain  Has done physical therapy without improvement  We will send her for left L5-S1 transforaminal epidural steroid injection  We will also obtain scoliosis x-rays  She will follow-up for further discussion of her symptoms continue    Again, thank you for allowing me to participate in the care of your patient.        Sincerely,        Lenny Gomes MD

## 2017-12-11 NOTE — MR AVS SNAPSHOT
After Visit Summary   12/11/2017    Michaela Dorman    MRN: [id number]           Patient Information     Date Of Birth          1950        Visit Information        Provider Department      12/11/2017 11:20 AM Lenny Gomes MD [facility]        Care Instructions    1. Injection at [facility]. They will call you to schedule.  2. Please obtain xrays. You may call to schedule this at [phone].   3. We will call you with the xray results. Please call our clinic if no relief after injection.       Please call our clinic with any questions or concerns: [phone]            Follow-ups after your visit        Your next 10 appointments already scheduled     Dec 19, 2017 12:30 PM CST   Return Visit with Loren Felipe MD   [facility]    [address] [place] [id number] [phone]            Dec 22, 2017   Procedure with Chuck Chand MD   [facility] ([facility])    [address] [place] [id number]   [phone]            Jan 16, 2018  1:00 PM CST   Return Visit with Syeda Ferguson MD   [facility]    [address] [place] [address]   [phone]            Jan 16, 2018  1:30 PM CST   Level 1 with NL INFUSION CHAIR 3   [facility] ([facility])    [address] [place] [id number]   [phone]              Who to contact     If you have questions or need follow up information about today's clinic visit or your schedule please contact [facility] NEUROSURGERY [facility] directly at [phone].  Normal or non-critical lab and imaging results will be communicated to you by MyChart, letter or phone within 4 business days after the clinic has received the results. If you do not hear from us within 7 days, please contact the clinic through  [employer] or phone. If you have a critical or abnormal lab result, we will notify you by phone as soon as possible.  Submit refill requests through [employer] or call your pharmacy and they will forward the refill request to us. Please allow 3 business days for your refill to be completed.          Additional Information About Your Visit        [employer] Information     [employer] gives you secure access to your electronic health record. If you see a primary care provider, you can also send messages to your care team and make appointments. If you have questions, please call your primary care clinic.  If you do not have a primary care provider, please call [phone] and they will assist you.        Care EveryWhere ID     This is your Care EveryWhere ID. This could be used by other organizations to access your [facility] medical records  [id number]        Your Vitals Were     Pulse Temperature Pulse Oximetry Breastfeeding? BMI (Body Mass Index)       78 98  F (36.7  C) (Oral) 98% No 22.27 kg/m2        Blood Pressure from Last 3 Encounters:   12/11/17 113/71   12/05/17 136/77   12/05/17 128/75    Weight from Last 3 Encounters:   12/11/17 138 lb (62.6 kg)   12/08/17 139 lb 9.6 oz (63.3 kg)   12/05/17 136 lb 14.4 oz (62.1 kg)              Today, you had the following     No orders found for display       Primary Care Provider Office Phone # Fax #    Phani Tapia PA-C [phone]       [place] [address]        Equal Access to Services     SHIVANI ARTIS AH: Hadii aad ku hadasho Soomaali, waaxda luqadaha, qaybta kaalmada adeegyada, wale pena. So [facility] [phone].    ATENCIÓN: Si habla español, tiene a ordoñez disposición servicios gratuitos de asistencia lingüística. Llame al [phone].    We comply with applicable federal civil rights laws and [place] laws. We do not discriminate on the basis of race, color, national origin, age, disability, sex,  sexual orientation, or gender identity.            Thank you!     Thank you for choosing [facility]INIC  for your care. Our goal is always to provide you with excellent care. Hearing back from our patients is one way we can continue to improve our services. Please take a few minutes to complete the written survey that you may receive in the mail after your visit with us. Thank you!             Your Updated Medication List - Protect others around you: Learn how to safely use, store and throw away your medicines at www.disposemymeds.org.          This list is accurate as of: 12/11/17 11:38 AM.  Always use your most recent med list.                   Brand Name Dispense Instructions for use Diagnosis    atorvastatin 20 MG tablet    LIPITOR    90 tablet    Take 1 tablet (20 mg) by mouth daily    Hyperlipidemia LDL goal <130       ciprofloxacin 250 MG tablet    CIPRO    90 tablet    Take 1 tablet (250 mg) by mouth daily    Chronic lower urinary tract infection       hydrochlorothiazide 25 MG tablet    HYDRODIURIL    90 tablet    TAKE ONE TABLET BY MOUTH ONCE DAILY    Essential hypertension       LORazepam 0.5 MG tablet    ATIVAN    15 tablet    Take 1 tablet (0.5 mg) by mouth every 4 hours as needed (Anxiety, Nausea/Vomiting or Sleep)    Malignant neoplasm of upper-outer quadrant of left female breast (H), Encounter for antineoplastic chemotherapy       methylPREDNISolone 4 MG tablet    MEDROL DOSEPAK    21 tablet    Follow package instructions    Chronic pain syndrome, Lumbar radiculopathy, Foraminal stenosis of lumbar region, DDD (degenerative disc disease), lumbar, Central stenosis of spinal canal       * oxybutynin 5 MG tablet    DITROPAN    90 tablet    TAKE ONE-HALF TABLET BY MOUTH TWICE A DAY    Dysuria       * oxybutynin 5 MG tablet    DITROPAN    90 tablet    TAKE ONE-HALF TABLET BY MOUTH TWICE A DAY    Dysuria       oxyCODONE IR 5 MG tablet    ROXICODONE    20 tablet    Take 1 tablet (5  mg) by mouth every 6 hours as needed for pain    Chronic pain syndrome, Lumbar radiculopathy       oxyCODONE-acetaminophen 5-325 MG per tablet    PERCOCET    45 tablet    Take 1 tablet by mouth every 4 hours as needed for pain    Post-op pain, S/P reverse total shoulder arthroplasty, right, S/P bilateral mastectomy, Trochanteric bursitis, unspecified laterality       priLOSEC 20 MG CR capsule   Generic drug:  omeprazole      Take by mouth daily        prochlorperazine 10 MG tablet    COMPAZINE    30 tablet    Take 1 tablet (10 mg) by mouth every 6 hours as needed (Nausea/Vomiting)    Encounter for antineoplastic chemotherapy       TYLENOL EX ST ARTHRITIS PAIN 500 MG tablet   Generic drug:  acetaminophen      Take 500-1,000 mg by mouth every 6 hours as needed for mild pain    Dermatitis due to sun       * venlafaxine 75 MG 24 hr capsule    EFFEXOR-XR     Take 75 mg by mouth daily Along with 37.5 mg cap.        * venlafaxine 37.5 MG 24 hr capsule    EFFEXOR XR    90 capsule    Take 1 capsule (37.5 mg) by mouth daily For one week then 2 daily for a week then 3 daily for 3-4 weeks.    Adjustment disorder with depressed mood       * Notice:  This list has 4 medication(s) that are the same as other medications prescribed for you. Read the directions carefully, and ask your doctor or other care provider to review them with you.

## 2017-12-11 NOTE — TELEPHONE ENCOUNTER
Pre-screening Questions for Radiology Injections:    Injection to be done at which interventional clinic site? [facility]    Procedure ordered by SHABNAM    Procedure ordered? Lumbar Epidural Steroid Injection    What insurance would patient like us to bill for this procedure? MEDICARE/BCBS      Worker's comp or MVA (motor vehicle accident) -Any injection DO NOT SCHEDULE and route to Jennifer Colon.      [employer] insurance - For SI joint injections, DO NOT SCHEDULE and route Nesha Pink.      [employer]- MBB's must be scheduled at LEAST two weeks apart      [employer] - Any injection besides hip/shoulder/knee joint DO NOT SCHEDULE and route to Nesha Pink. She will obtain PA and call pt back to schedule procedure or notify pt of denial.       HP [employer]-PA REQUIRED FOR NON-VIANNEY OR Joint injections    Any chance of pregnancy? NO   If YES, do NOT schedule and route to RN pool    Is an  needed? No     Patient has a drive home? (mandatory) YES:     Is patient taking any blood thinners (plavix, coumadin, jantoven, warfarin, heparin, pradaxa or dabigatran )? No   If hold needed, do NOT schedule, route to RN pool     Is patient taking any aspirin products? No     If more than 325mg/day do NOT schedule; route to RN pool     For CERVICAL procedures, hold all aspirin products for 6 days.      Does the patient have a bleeding or clotting disorder? No     If YES, okay to schedule AND route to RN nurse pool    **For any patients with platelet count <100, must be forwarded to provider**    Is patient diabetic?  No  If YES, have them bring their glucometer.    Does patient have an active infection or treated for one within the past week? No     Is patient currently taking any antibiotics?  No     For patients on chronic, preventative, or prophylactic antibiotics, procedures may be scheduled.     For patients on antibiotics for active or recent infection:    Lucy Mcdonald,  Shon Downing-antibiotic course must have been completed for 4 days    Dr. Rodríguez-antibiotic course must have been completed for 7 days    Is patient currently taking any steroid medications? (i.e. Prednisone, Medrol)  No     For patients on steroid medications:    Lucy Mcdonald Burton, Snitzer-steroid course must have been completed for 4 days    -steroid course must have been completed for 7 days    Reviewed with patient:  If you are started on any steroids or antibiotics between now and your appointment, you must contact us because it may affect our ability to perform your procedure.  Yes    Is patient actively being treated for cancer or immunocompromised? YES-   If YES, do NOT schedule and route to RN pool     Are you able to get on and off an exam table with minimal or no assistance? Yes  If NO, do NOT schedule and route to RN pool    Are you able to roll over and lay on your stomach with minimal or no assistance? Yes  If NO, do NOT schedule and route to RN pool     Any allergies to contrast dye, iodine, shellfish, or numbing and steroid medications? No  If YES, route to RN pool AND add allergy information to appointment notes    Allergies: No known drug allergies      Has the patient had a flu shot or any other vaccinations within 7 days before or after the procedure.  No     Does patient have an MRI/CT?  YES:   (SI joint, hip injections, lumbar sympathetic blocks, and stellate ganglion blocks do not require an MRI)    Was the MRI done w/in the last 3 years?  Yes    Was MRI done at [facility]? Yes      If not, where was it done? N/A       If MRI was not done at [facility], [facility] or [facility] do NOT schedule and route to nursing.  If pt has an imaging disc, the injection may be scheduled but pt has to bring disc to appt. If they show up w/out disc the injection cannot be done    Reminders (please tell patient if applicable):       Instructed pt to arrive 30 minutes early for IV start if this  is for a cervical procedure, ALL sympathetic (stellate ganglion, hypogastric, or lumbar sympathetic block) and all sedation procedures (RFA, spinal cord stimulation trials).  Not Applicable   -IVs are not routinely placed for Dr. Downing cervical cases   -Dr. Menendez: IVs for cervical ESIs and cervical TBDs (not CMBBs/facet inj)      If NPO for sedation, informed patient that it is okay to take medications with sips of water (except if they are to hold blood thinners).  Not Applicable   *DO take blood pressure medication if it is prescribed*      If this is for a cervical VIANNEY, informed patient that aspirin needs to be held for 6 days.   Not Applicable      For all patients not having spinal cord stimulator (SCS) trials or radiofrequency ablations (RFAs), informed patient:    IV sedation is not provided for this procedure.  If you feel that an oral anti-anxiety medication is needed, you can discuss this further with your referring provider or primary care provider.  The Pain Clinic provider will discuss specifics of what the procedure includes at your appointment.  Most procedures last 10-20 minutes.  We use numbing medications to help with any discomfort during the procedure.  Not Applicable      Do not schedule procedures requiring IV placement in the first appointment of the day or first appointment after lunch.       For patients 85 or older we recommend having an adult stay w/ them for the remainder of the day.       Does the patient have any questions?    Nesha Pink  [facility]

## 2017-12-12 NOTE — TELEPHONE ENCOUNTER
Pt stated she does not know if she is immunocompromised- she just finished up with Herceptin    Ran a Micomedex and there was no drug interaction.     Scheduled patient.     Mary Nur RN, [facility]  Pain Clinic Care Coordinator

## 2017-12-13 ENCOUNTER — RADIANT APPOINTMENT (OUTPATIENT)
Dept: RADIOLOGY | Facility: CLINIC | Age: 67
End: 2017-12-13
Attending: PSYCHIATRY & NEUROLOGY
Payer: COMMERCIAL

## 2017-12-13 ENCOUNTER — RADIOLOGY INJECTION OFFICE VISIT (OUTPATIENT)
Dept: PALLIATIVE MEDICINE | Facility: CLINIC | Age: 67
End: 2017-12-13
Payer: COMMERCIAL

## 2017-12-13 ENCOUNTER — TELEPHONE (OUTPATIENT)
Dept: SURGERY | Facility: CLINIC | Age: 67
End: 2017-12-13

## 2017-12-13 VITALS — SYSTOLIC BLOOD PRESSURE: 149 MMHG | DIASTOLIC BLOOD PRESSURE: 88 MMHG | OXYGEN SATURATION: 97 % | HEART RATE: 76 BPM

## 2017-12-13 DIAGNOSIS — M54.16 LUMBAR RADICULOPATHY: ICD-10-CM

## 2017-12-13 DIAGNOSIS — M54.16 LUMBAR RADICULOPATHY: Primary | ICD-10-CM

## 2017-12-13 PROCEDURE — 64483 NJX AA&/STRD TFRM EPI L/S 1: CPT | Mod: LT | Performed by: PSYCHIATRY & NEUROLOGY

## 2017-12-13 ASSESSMENT — PAIN SCALES - GENERAL: PAINLEVEL: MILD PAIN (3)

## 2017-12-13 NOTE — TELEPHONE ENCOUNTER
Received orders to schedule a Back Injection. Orders placed by Alfredo. Spoke with pt, said she was able to get into jim today.

## 2017-12-13 NOTE — NURSING NOTE
"Chief Complaint   Patient presents with     Pain       Initial /74  Pulse 85 Estimated body mass index is 22.27 kg/(m^2) as calculated from the following:    Height as of 12/8/17: 1.676 m (5' 6\").    Weight as of 12/11/17: 62.6 kg (138 lb).  Medication Reconciliation: complete     Pre-procedure Intake    Have you been fasting? NA    If yes, for how long? No     Are you taking a prescribed blood thinner such as coumadin, Plavix, Xarelto?    No    If yes, when did you take your last dose? No    Do you take aspirin?  No    If cervical procedure, have you held aspirin for 6 days?   No     Do you have any allergies to contrast dye, iodine, steroid and/or numbing medications?  NO    Are you currently taking antibiotics or have an active infection? Yes Ciprofloxacin for UTI    Have you had a fever/elevated temperature within the past week? NO    Are you currently taking oral steroids? NO    Do you have a ? Yes       Are you pregnant or breastfeeding?  NO    Are the vital signs normal?  Yes    Constance Landa MA            "

## 2017-12-13 NOTE — MR AVS SNAPSHOT
After Visit Summary   12/13/2017    Michaela Dorman    MRN: [id number]           Patient Information     Date Of Birth          1950        Visit Information        Provider Department      12/13/2017 1:15 PM Monika Ayala MD [facility] ([facility] [place]        Care Instructions    [facility]   Procedure Discharge Instructions    Today you saw: Dr. Monika Ayala      You had an:  Epidural steroid injection     Medications used:  Lidocaine   Bupivacaine   Dexamethasone  Omnipaque             Be cautious when walking. Numbness and/or weakness in the lower extremities may occur for up to 6-8 hours after the procedure due to effect of the local anesthetic    Do not drive for 6 hours. The effect of the local anesthetic could slow your reflexes.     You may resume your regular activities after 24 hours    Avoid strenuous activity for the first 24 hours    You may shower, however avoid swimming, tub baths or hot tubs for 24 hours following your procedure    You may have a mild to moderate increase in pain for several days following the injection.    It may take up to 14 days for the steroid medication to start working although you may feel the effect as early as a few days after the procedure.       You may use ice packs for 10-15 minutes, 3 to 4 times a day at the injection site for comfort    Do not use heat to painful areas for 6 to 8 hours. This will give the local anesthetic time to wear off and prevent you from accidentally burning your skin.     You may use anti-inflammatory medications (such as Ibuprofen or Aleve or Advil) or Tylenol for pain control if necessary    If you were fasting, you may resume your normal diet and medications after the procedure    If you experience any of the following, call the pain center nursing line during work hours at [phone] or the after hours provider line at [phone]:  -Fever over 100 degree F  -Swelling, bleeding, redness,  drainage, warmth at the injection site  -Progressive weakness or numbness in your legs   -Loss of bowel or bladder function  -Unusual new onset of pain that is not improving      Phone #s:  Appointment line: [phone];  Nurse line: [phone]              Follow-ups after your visit        Your next 10 appointments already scheduled     Dec 19, 2017 12:30 PM CST   Return Visit with Loren Felipe MD   [facility] ([facility])    [address] [place] [id number] [phone]            Dec 22, 2017   Procedure with Chuck Chand MD   [facility] ([facility])    [address] [place] [id number]   [phone]            Jan 16, 2018  1:00 PM CST   Return Visit with Syeda Ferguson MD   [facility]    [address] [place] [id number]   [phone]            Jan 16, 2018  1:30 PM CST   Level 1 with NL INFUSION CHAIR 3   [facility] ([facility])    [address] [place] [address]  [place] [id number] [phone]              Who to contact     If you have questions or need follow up information about today's clinic visit or your schedule please contact [facility] directly at [phone].  Normal or non-critical lab and imaging results will be communicated to you by [employer], letter or phone within 4 business days after the clinic has received the results. If you do not hear from us within 7 days, please contact the clinic through [employer] or phone. If you have a critical or abnormal lab result, we will notify you by phone as soon as possible.  Submit refill requests through [employer] management or call your pharmacy and they will forward the refill request to us. Please allow 3 business days for your refill to be completed.          Additional Information About Your Visit        [employer] management Information     [employer] gives you secure  access to your electronic health record. If you see a primary care provider, you can also send messages to your care team and make appointments. If you have questions, please call your primary care clinic.  If you do not have a primary care provider, please call [phone] and they will assist you.        Care EveryWhere ID     This is your Care EveryWhere ID. This could be used by other organizations to access your [place] medical records  [id number]        Your Vitals Were     Pulse                   85            Blood Pressure from Last 3 Encounters:   12/13/17 150/74   12/11/17 113/71   12/05/17 136/77    Weight from Last 3 Encounters:   12/11/17 62.6 kg (138 lb)   12/08/17 63.3 kg (139 lb 9.6 oz)   12/05/17 62.1 kg (136 lb 14.4 oz)              Today, you had the following     No orders found for display       Primary Care Provider Office Phone # Fax #    Phani Tapia PA-C [phone]       [place] [address]        Equal Access to Services     DAYAMI ARTIS : Hadii aad ku hadasho Soomaali, waaxda luqadaha, qaybta kaalmada adeegyada, wale allen . So [facility] [phone].    ATENCIÓN: Si habla español, tiene a ordoñez disposición servicios gratuitos de asistencia lingüística. [facility] [phone].    We comply with applicable federal civil rights laws and [place] laws. We do not discriminate on the basis of race, color, national origin, age, disability, sex, sexual orientation, or gender identity.            Thank you!     Thank you for choosing [facility]  for your care. Our goal is always to provide you with excellent care. Hearing back from our patients is one way we can continue to improve our services. Please take a few minutes to complete the written survey that you may receive in the mail after your visit with us. Thank you!             Your Updated Medication List - Protect others around you: Learn how to  safely use, store and throw away your medicines at www.disposemymeds.org.          This list is accurate as of: 12/13/17  1:39 PM.  Always use your most recent med list.                   Brand Name Dispense Instructions for use Diagnosis    atorvastatin 20 MG tablet    LIPITOR    90 tablet    Take 1 tablet (20 mg) by mouth daily    Hyperlipidemia LDL goal <130       ciprofloxacin 250 MG tablet    CIPRO    90 tablet    Take 1 tablet (250 mg) by mouth daily    Chronic lower urinary tract infection       hydrochlorothiazide 25 MG tablet    HYDRODIURIL    90 tablet    TAKE ONE TABLET BY MOUTH ONCE DAILY    Essential hypertension       LORazepam 0.5 MG tablet    ATIVAN    15 tablet    Take 1 tablet (0.5 mg) by mouth every 4 hours as needed (Anxiety, Nausea/Vomiting or Sleep)    Malignant neoplasm of upper-outer quadrant of left female breast (H), Encounter for antineoplastic chemotherapy       methylPREDNISolone 4 MG tablet    MEDROL DOSEPAK    21 tablet    Follow package instructions    Chronic pain syndrome, Lumbar radiculopathy, Foraminal stenosis of lumbar region, DDD (degenerative disc disease), lumbar, Central stenosis of spinal canal       * oxybutynin 5 MG tablet    DITROPAN    90 tablet    TAKE ONE-HALF TABLET BY MOUTH TWICE A DAY    Dysuria       * oxybutynin 5 MG tablet    DITROPAN    90 tablet    TAKE ONE-HALF TABLET BY MOUTH TWICE A DAY    Dysuria       oxyCODONE IR 5 MG tablet    ROXICODONE    20 tablet    Take 1 tablet (5 mg) by mouth every 6 hours as needed for pain    Chronic pain syndrome, Lumbar radiculopathy       oxyCODONE-acetaminophen 5-325 MG per tablet    PERCOCET    45 tablet    Take 1 tablet by mouth every 4 hours as needed for pain    Post-op pain, S/P reverse total shoulder arthroplasty, right, S/P bilateral mastectomy, Trochanteric bursitis, unspecified laterality       priLOSEC 20 MG CR capsule   Generic drug:  omeprazole      Take by mouth daily        prochlorperazine 10 MG tablet     COMPAZINE    30 tablet    Take 1 tablet (10 mg) by mouth every 6 hours as needed (Nausea/Vomiting)    Encounter for antineoplastic chemotherapy       TYLENOL EX ST ARTHRITIS PAIN 500 MG tablet   Generic drug:  acetaminophen      Take 500-1,000 mg by mouth every 6 hours as needed for mild pain    Dermatitis due to sun       * venlafaxine 75 MG 24 hr capsule    EFFEXOR-XR     Take 75 mg by mouth daily Along with 37.5 mg cap.        * venlafaxine 37.5 MG 24 hr capsule    EFFEXOR XR    90 capsule    Take 1 capsule (37.5 mg) by mouth daily For one week then 2 daily for a week then 3 daily for 3-4 weeks.    Adjustment disorder with depressed mood       * Notice:  This list has 4 medication(s) that are the same as other medications prescribed for you. Read the directions carefully, and ask your doctor or other care provider to review them with you.

## 2017-12-13 NOTE — NURSING NOTE
Discharge Information    IV Discontiued Time:  NA    Amount of Fluid Infused:  NA    Discharge Criteria = When patient returns to baseline or as per MD order    Consciousness:  Pt is fully awake    Circulation:  BP +/- 20% of pre-procedure level    Respiration:  Patient is able to breathe deeply    O2 Sat:  Patient is able to maintain O2 Sat >92% on room air    Activity:  Moves 4 extremities on command    Ambulation:  Patient is able to stand and walk or stand and pivot into wheelchair    Dressing:  Clean/dry or No Dressing    Notes:   Discharge instructions and AVS given to patient    Patient meets criteria for discharge?  YES    Admitted to PCU?  No    Responsible adult present to accompany patient home?  Yes    Signature/Title:    Robyn Nur RN Care Coordinator  [facility]

## 2017-12-13 NOTE — PROGRESS NOTES
"Pre procedure Diagnosis: lumbar radiculopathy  Post procedure Diagnosis: Same  Procedure performed: Left L5-S1 transforaminal epidural steroid injection   Anesthesia: none  Complications: none  Operators: Mallika Ayala MD; Kathe Benavides MD (Pain Medicine Fellow)    Indications:   Michaela Dorman is a 67 year old female was sent by Phani Tapia PA-C for lumbar epidural injection. They have a history of low back pain and left leg pain, with 60% of pain in the leg and 40% in the back. Pain radiates into lateral aspect of left leg and into lateral/front of foot. No weakness in left leg, no numbness/tingling. Pain is a sharp, aching feeling. Worse with standing and walking. Tried PT and medications.  Exam shows 5/5 strength in the lower extremities and they have tried conservative treatment including medications.    MRI was done on 11/30/2017 which showed   \"IMPRESSION:   1. Interval development of degenerative disc disease at L1-L2 with  minimal left foraminal stenosis.  2. Interval development of advanced degenerative disc disease at L2-L3  with mild central stenosis, moderate right foraminal stenosis and mild  left foraminal stenosis.  3. Mild central stenosis at L3-L4 related to multiple factors. Mild to  moderate right and minimal left foraminal stenosis at this level and  no change since the prior exam.  4. Borderline-mild central stenosis at L4-L5 related multiple factors.  Mild right and moderate left foraminal stenosis at this level and no  change since the prior exam.  5. Progressive advanced degenerative disc disease L5-S1 with right S1  lateral recess stenosis and increasing severe right and moderate left  foraminal stenosis.\"    Options/alternatives, benefits and risks were discussed with the patient including bleeding, infection, tissue trauma, numbness, weakness, paralysis, spinal cord injury, radiation exposure, headache and reaction to medications. Questions were answered to her satisfaction and she " agrees to proceed. Voluntary informed consent was obtained and signed.     Vitals were reviewed: Yes, /88  Pulse 76  SpO2 97%  Allergies were reviewed:  Yes   Medications were reviewed:  Yes   Pre-procedure pain score: 3/10    Procedure:  After getting informed consent, patient was brought into the procedure suite and was placed in a prone position on the procedure table.   A Pause for the Cause was performed.  Patient was prepped and draped in sterile fashion.     After identifying the left L5-S1 neuroforamen, the C-arm was rotated to a left lateral oblique angle.  A total of 3 ml of Lidocaine 1% was used to anesthetize the skin and the needle track at a skin entry site coaxial with the fluoroscopy beam, and overriding the superior aspect of the neuroforamen.  A 22 gauge 3.5 inch spinal needle was advanced under intermittent fluoroscopy until it entered the foramen superiorly.    The position was then inspected from anteroposterior and lateral views, and the needle adjusted appropriately.  A total of 2 ml of Omnipaque-300 was injected, confirming appropriate position, with spread into the nerve root sheath and the epidural space, with no intravascular uptake. 8 ml was wasted.    1.5 ml of 0.5% bupivacaine with 10 mg of dexamethasone was injected.  The needle was flushed with lidocaine and removed.    During the procedure, there was a paresthesia.   Hemostasis was achieved, the area was cleaned, and bandaids were placed when appropriate.  The patient tolerated the procedure well, and was taken to the recovery room.    Images were saved to PACS.    Post-procedure pain score: 0/10  Follow-up includes:   -f/u phone call in one week  -f/u with referring provider    Mallika Ayala MD  [facility]

## 2017-12-13 NOTE — PATIENT INSTRUCTIONS
[facility]   Procedure Discharge Instructions    Today you saw: Dr. Monika Ayala      You had an:  Epidural steroid injection     Medications used:  Lidocaine   Bupivacaine   Dexamethasone  Omnipaque             Be cautious when walking. Numbness and/or weakness in the lower extremities may occur for up to 6-8 hours after the procedure due to effect of the local anesthetic    Do not drive for 6 hours. The effect of the local anesthetic could slow your reflexes.     You may resume your regular activities after 24 hours    Avoid strenuous activity for the first 24 hours    You may shower, however avoid swimming, tub baths or hot tubs for 24 hours following your procedure    You may have a mild to moderate increase in pain for several days following the injection.    It may take up to 14 days for the steroid medication to start working although you may feel the effect as early as a few days after the procedure.       You may use ice packs for 10-15 minutes, 3 to 4 times a day at the injection site for comfort    Do not use heat to painful areas for 6 to 8 hours. This will give the local anesthetic time to wear off and prevent you from accidentally burning your skin.     You may use anti-inflammatory medications (such as Ibuprofen or Aleve or Advil) or Tylenol for pain control if necessary    If you were fasting, you may resume your normal diet and medications after the procedure    If you experience any of the following, call the pain center nursing line during work hours at [phone] or the after hours provider line at [phone]:  -Fever over 100 degree F  -Swelling, bleeding, redness, drainage, warmth at the injection site  -Progressive weakness or numbness in your legs   -Loss of bowel or bladder function  -Unusual new onset of pain that is not improving      Phone #s:  Appointment line: [phone];  Nurse line: [phone]

## 2017-12-18 NOTE — PROGRESS NOTES
Outpatient Physical Therapy Discharge Note     Patient: Michaela Dorman    : 1950    Beginning/End Dates of Reporting Period:  2017 -2017    Referring Provider: Phani Arreola Diagnosis: B hip pain, LBP    Assessment: Patient has attended 6  sessions for above noted diagnosis. Treatment has included Manual therapy, instruction in home program, taping.. Improvements noted in the following impairments:  range of motion, functional activity tolerance. Functional improvement noted in LEFS and back pain. Patient reports >90% improvements in condition, and goals progress. Patient attendance was sporadic, HEP compliance fair along with good reduction in symptoms as reported. Patient did not attend PT with substitute PT while writer was on [place]. Discharge PT at this time.     Client Self Report: Again has not been here due to her own scheduling conflicts. Riding horses several times per week.      Objective Measurements:  Objective Measure: LEFS  Details: 61/80. Improved from 34 at evaluation        Goals:  Goal Identifier 1   Goal Description Patient will improve activity tolerance via LEFS improvements from 34/80 to 55/80 to return to recreational workout routines.    Target Date /17   Date Met   2017   Progress:     Goal Identifier 2   Goal Description Patient will note ability to sit for 2 hours without pain to better tolerance recreational and home based activities in 6 weeks   Target Date 17   Date Met  17   Progress:     Goal Identifier 3   Goal Description Patient will report no increased back pain during light exercise routine in 8 weeks   Target Date 17   Date Met      Progress: Progress made during session. UNable to assess at [place], patient did not return (writer on leave also)     Progress Toward Goals:   Progress this reporting period: Good progress. Inconsistent attendance to fully address ROM restrictions but she did get much better.          Plan:  Discharge from therapy.    Discharge:    Reason for Discharge: Patient has failed to schedule further appointments.  Reason for Discharge: Medicare G-code: Patient did not attend a final scheduled session prior to discharge. Unable to determine discharge functional status.    Equipment Issued: HEP    Discharge Plan: Patient to continue home program.    Thank you for the referral.  Radha Nelson................... PT, DPT, CLT   12/18/2017, 8:21 AM  [phone]

## 2017-12-20 ENCOUNTER — TELEPHONE (OUTPATIENT)
Dept: RADIOLOGY | Facility: CLINIC | Age: 67
End: 2017-12-20

## 2017-12-20 ENCOUNTER — MYC MEDICAL ADVICE (OUTPATIENT)
Dept: ONCOLOGY | Facility: CLINIC | Age: 67
End: 2017-12-20

## 2017-12-20 NOTE — TELEPHONE ENCOUNTER
Patient had a Left L5-S1 transforaminal epidural steroid injection on 12/13/17.  Called patient for an update.      Pt reported the following details:  She noticed improvement in her pain today. She was able to walk on the treadmill for an hour without having to take a pain pill.  Told patient that the information will be forwarded to her provider.  Also explained that, if a steroid medication was used, it could take up to 14 days to feel the full effect and if pt has any further questions or concerns pt should call the nurse line at [phone].

## 2017-12-22 ENCOUNTER — SURGERY (OUTPATIENT)
Age: 67
End: 2017-12-22

## 2017-12-22 ENCOUNTER — HOSPITAL ENCOUNTER (OUTPATIENT)
Facility: CLINIC | Age: 67
Discharge: HOME | End: 2017-12-22
Attending: INTERNAL MEDICINE | Admitting: INTERNAL MEDICINE
Payer: MEDICARE

## 2017-12-22 VITALS
DIASTOLIC BLOOD PRESSURE: 84 MMHG | SYSTOLIC BLOOD PRESSURE: 139 MMHG | OXYGEN SATURATION: 98 % | TEMPERATURE: 98.2 F | RESPIRATION RATE: 14 BRPM

## 2017-12-22 LAB — COLONOSCOPY: NORMAL

## 2017-12-22 PROCEDURE — 25000128 H RX IP 250 OP 636: Performed by: INTERNAL MEDICINE

## 2017-12-22 PROCEDURE — 40000296 ZZH STATISTIC ENDO RECOVERY CLASS 1:2 FIRST HOUR: Performed by: INTERNAL MEDICINE

## 2017-12-22 PROCEDURE — 25000125 ZZHC RX 250: Performed by: INTERNAL MEDICINE

## 2017-12-22 PROCEDURE — 45378 DIAGNOSTIC COLONOSCOPY: CPT | Performed by: INTERNAL MEDICINE

## 2017-12-22 PROCEDURE — G0121 COLON CA SCRN NOT HI RSK IND: HCPCS | Performed by: INTERNAL MEDICINE

## 2017-12-22 RX ORDER — SIMETHICONE
LIQUID (ML) MISCELLANEOUS PRN
Status: DISCONTINUED | OUTPATIENT
Start: 2017-12-22 | End: 2017-12-22 | Stop reason: HOSPADM

## 2017-12-22 RX ORDER — LIDOCAINE 40 MG/G
CREAM TOPICAL
Status: DISCONTINUED | OUTPATIENT
Start: 2017-12-22 | End: 2017-12-22 | Stop reason: HOSPADM

## 2017-12-22 RX ORDER — ONDANSETRON 4 MG/1
4 TABLET, ORALLY DISINTEGRATING ORAL EVERY 6 HOURS PRN
Status: CANCELLED | OUTPATIENT
Start: 2017-12-22

## 2017-12-22 RX ORDER — ONDANSETRON 2 MG/ML
4 INJECTION INTRAMUSCULAR; INTRAVENOUS EVERY 6 HOURS PRN
Status: CANCELLED | OUTPATIENT
Start: 2017-12-22

## 2017-12-22 RX ORDER — ONDANSETRON 2 MG/ML
4 INJECTION INTRAMUSCULAR; INTRAVENOUS
Status: DISCONTINUED | OUTPATIENT
Start: 2017-12-22 | End: 2017-12-22 | Stop reason: HOSPADM

## 2017-12-22 RX ORDER — NALOXONE HYDROCHLORIDE 0.4 MG/ML
.1-.4 INJECTION, SOLUTION INTRAMUSCULAR; INTRAVENOUS; SUBCUTANEOUS
Status: CANCELLED | OUTPATIENT
Start: 2017-12-22 | End: 2017-12-23

## 2017-12-22 RX ORDER — FLUMAZENIL 0.1 MG/ML
0.2 INJECTION, SOLUTION INTRAVENOUS
Status: CANCELLED | OUTPATIENT
Start: 2017-12-22 | End: 2017-12-22

## 2017-12-22 RX ORDER — FENTANYL CITRATE 50 UG/ML
INJECTION, SOLUTION INTRAMUSCULAR; INTRAVENOUS PRN
Status: DISCONTINUED | OUTPATIENT
Start: 2017-12-22 | End: 2017-12-22 | Stop reason: HOSPADM

## 2017-12-22 RX ADMIN — Medication 0.25 ML: at 09:14

## 2017-12-22 RX ADMIN — FENTANYL CITRATE 50 MCG: 50 INJECTION, SOLUTION INTRAMUSCULAR; INTRAVENOUS at 09:08

## 2017-12-22 RX ADMIN — MIDAZOLAM 1 MG: 1 INJECTION INTRAMUSCULAR; INTRAVENOUS at 09:10

## 2017-12-22 RX ADMIN — MIDAZOLAM 1 MG: 1 INJECTION INTRAMUSCULAR; INTRAVENOUS at 09:09

## 2017-12-22 RX ADMIN — LIDOCAINE HYDROCHLORIDE 1 ML: 10 INJECTION, SOLUTION EPIDURAL; INFILTRATION; INTRACAUDAL; PERINEURAL at 07:56

## 2017-12-22 RX ADMIN — FENTANYL CITRATE 25 MCG: 50 INJECTION, SOLUTION INTRAMUSCULAR; INTRAVENOUS at 09:13

## 2017-12-22 RX ADMIN — FENTANYL CITRATE 25 MCG: 50 INJECTION, SOLUTION INTRAMUSCULAR; INTRAVENOUS at 09:17

## 2017-12-22 RX ADMIN — MIDAZOLAM 1 MG: 1 INJECTION INTRAMUSCULAR; INTRAVENOUS at 09:11

## 2017-12-22 RX ADMIN — MIDAZOLAM 2 MG: 1 INJECTION INTRAMUSCULAR; INTRAVENOUS at 09:08

## 2017-12-22 NOTE — IP AVS SNAPSHOT
MRN:[id number]                      After Visit Summary   12/22/2017    Michaela Dorman    MRN: [id number]           Thank you!     Thank you for choosing [facility] for your care. Our goal is always to provide you with excellent care. Hearing back from our patients is one way we can continue to improve our services. Please take a few minutes to complete the written survey that you may receive in the mail after you visit with us. Thank you!        Patient Information     Date Of Birth          1950        About your hospital stay     You were admitted on:  December 22, 2017 You last received care in the:  [facility]    You were discharged on:  December 22, 2017       Who to Call     For medical emergencies, please call 911.  For non-urgent questions about your medical care, please call your primary care provider or clinic, [phone]  For questions related to your surgery, please call your surgery clinic        Attending Provider     Provider Specialty    Chuck Chand MD Gastroenterology       Primary Care Provider Office Phone # Fax #    Phani Tapia PA-C [phone]      Your next 10 appointments already scheduled     Jan 04, 2018  2:00 PM CST   [facility]    [address] [place] [id number] [phone]            Jan 16, 2018  1:00 PM CST   Return Visit with Syeda Ferguson MD   [facility] ([address] [address]   [phone]            Jan 16, 2018  1:30 PM CST   Level 1 with NL INFUSION CHAIR 3   [facility] ([facility])    [address] [place] [address] [address] [place] [address]   [phone]              Pending Results     No orders found from 12/20/2017 to 12/23/2017.            Admission Information     Date & Time Provider Department Dept. Phone     12/22/2017 Chuck Chand MD [facility] [phone]      Your Vitals Were     Blood Pressure Temperature Respirations Pulse Oximetry          135/85 98.2  F (36.8  C) (Oral) 11 99%        MyChart Information     [employer] gives you secure access to your electronic health record. If you see a primary care provider, you can also send messages to your care team and make appointments. If you have questions, please call your primary care clinic.  If you do not have a primary care provider, please call [phone] and they will assist you.        Care EveryWhere ID     This is your Care EveryWhere ID. This could be used by other organizations to access your [place] medical records  [id number]        Equal Access to Services     SHIVANI ARTIS : Amy Manzano, preethi nguyễn, nandini haywardalmanuel [place], wale pena. So [facility] [phone].    ATENCIÓN: Si habla español, tiene a ordoñez disposición servicios gratuitos de asistencia lingüística. Llame al [phone].    We comply with applicable federal civil rights laws and [place] laws. We do not discriminate on the basis of race, color, national origin, age, disability, sex, sexual orientation, or gender identity.               Review of your medicines      CONTINUE these medicines which have NOT CHANGED        Dose / Directions    atorvastatin 20 MG tablet   Commonly known as:  LIPITOR   Used for:  Hyperlipidemia LDL goal <130        Dose:  20 mg   Take 1 tablet (20 mg) by mouth daily   Quantity:  90 tablet   Refills:  1       ciprofloxacin 250 MG tablet   Commonly known as:  CIPRO   Used for:  Chronic lower urinary tract infection        Dose:  250 mg   Take 1 tablet (250 mg) by mouth daily   Quantity:  90 tablet   Refills:  1       hydrochlorothiazide 25 MG tablet   Commonly known as:  HYDRODIURIL   Used for:  Essential hypertension        TAKE ONE TABLET BY MOUTH ONCE DAILY   Quantity:  90 tablet    Refills:  1       LORazepam 0.5 MG tablet   Commonly known as:  ATIVAN   Used for:  Malignant neoplasm of upper-outer quadrant of left female breast (H), Encounter for antineoplastic chemotherapy        Dose:  0.5 mg   Take 1 tablet (0.5 mg) by mouth every 4 hours as needed (Anxiety, Nausea/Vomiting or Sleep)   Quantity:  15 tablet   Refills:  0       * oxybutynin 5 MG tablet   Commonly known as:  DITROPAN   Used for:  Dysuria        TAKE ONE-HALF TABLET BY MOUTH TWICE A DAY   Quantity:  90 tablet   Refills:  0       * oxybutynin 5 MG tablet   Commonly known as:  DITROPAN   Used for:  Dysuria        TAKE ONE-HALF TABLET BY MOUTH TWICE A DAY   Quantity:  90 tablet   Refills:  1       oxyCODONE IR 5 MG tablet   Commonly known as:  ROXICODONE   Used for:  Chronic pain syndrome, Lumbar radiculopathy        Dose:  5 mg   Take 1 tablet (5 mg) by mouth every 6 hours as needed for pain   Quantity:  20 tablet   Refills:  0       oxyCODONE-acetaminophen 5-325 MG per tablet   Commonly known as:  PERCOCET   Used for:  Post-op pain, S/P reverse total shoulder arthroplasty, right, S/P bilateral mastectomy, Trochanteric bursitis, unspecified laterality        Dose:  1 tablet   Take 1 tablet by mouth every 4 hours as needed for pain   Quantity:  45 tablet   Refills:  0       priLOSEC 20 MG CR capsule   Generic drug:  omeprazole        Take by mouth daily   Refills:  0       prochlorperazine 10 MG tablet   Commonly known as:  COMPAZINE   Used for:  Encounter for antineoplastic chemotherapy        Dose:  10 mg   Take 1 tablet (10 mg) by mouth every 6 hours as needed (Nausea/Vomiting)   Quantity:  30 tablet   Refills:  3       TYLENOL EX ST ARTHRITIS PAIN 500 MG tablet   Used for:  Dermatitis due to sun   Generic drug:  acetaminophen        Dose:  500-1000 mg   Take 500-1,000 mg by mouth every 6 hours as needed for mild pain   Refills:  0       * venlafaxine 75 MG 24 hr capsule   Commonly known as:  EFFEXOR-XR        Dose:  75 mg    Take 75 mg by mouth daily Along with 37.5 mg cap.   Refills:  0       * venlafaxine 37.5 MG 24 hr capsule   Commonly known as:  EFFEXOR XR   Used for:  Adjustment disorder with depressed mood        Dose:  37.5 mg   Take 1 capsule (37.5 mg) by mouth daily For one week then 2 daily for a week then 3 daily for 3-4 weeks.   Quantity:  90 capsule   Refills:  1       * Notice:  This list has 4 medication(s) that are the same as other medications prescribed for you. Read the directions carefully, and ask your doctor or other care provider to review them with you.             Protect others around you: Learn how to safely use, store and throw away your medicines at www.[employer] Foundryeds.org.             Medication List: This is a list of all your medications and when to take them. Check marks below indicate your daily home schedule. Keep this list as a reference.      Medications           Morning Afternoon Evening Bedtime As Needed    atorvastatin 20 MG tablet   Commonly known as:  LIPITOR   Take 1 tablet (20 mg) by mouth daily                                ciprofloxacin 250 MG tablet   Commonly known as:  CIPRO   Take 1 tablet (250 mg) by mouth daily                                hydrochlorothiazide 25 MG tablet   Commonly known as:  HYDRODIURIL   TAKE ONE TABLET BY MOUTH ONCE DAILY                                LORazepam 0.5 MG tablet   Commonly known as:  ATIVAN   Take 1 tablet (0.5 mg) by mouth every 4 hours as needed (Anxiety, Nausea/Vomiting or Sleep)                                * oxybutynin 5 MG tablet   Commonly known as:  DITROPAN   TAKE ONE-HALF TABLET BY MOUTH TWICE A DAY                                * oxybutynin 5 MG tablet   Commonly known as:  DITROPAN   TAKE ONE-HALF TABLET BY MOUTH TWICE A DAY                                oxyCODONE IR 5 MG tablet   Commonly known as:  ROXICODONE   Take 1 tablet (5 mg) by mouth every 6 hours as needed for pain                                oxyCODONE-acetaminophen  5-325 MG per tablet   Commonly known as:  PERCOCET   Take 1 tablet by mouth every 4 hours as needed for pain                                priLOSEC 20 MG CR capsule   Take by mouth daily   Generic drug:  omeprazole                                prochlorperazine 10 MG tablet   Commonly known as:  COMPAZINE   Take 1 tablet (10 mg) by mouth every 6 hours as needed (Nausea/Vomiting)                                TYLENOL EX ST ARTHRITIS PAIN 500 MG tablet   Take 500-1,000 mg by mouth every 6 hours as needed for mild pain   Generic drug:  acetaminophen                                * venlafaxine 75 MG 24 hr capsule   Commonly known as:  EFFEXOR-XR   Take 75 mg by mouth daily Along with 37.5 mg cap.                                * venlafaxine 37.5 MG 24 hr capsule   Commonly known as:  EFFEXOR XR   Take 1 capsule (37.5 mg) by mouth daily For one week then 2 daily for a week then 3 daily for 3-4 weeks.                                * Notice:  This list has 4 medication(s) that are the same as other medications prescribed for you. Read the directions carefully, and ask your doctor or other care provider to review them with you.

## 2017-12-22 NOTE — H&P
[facility] GI Pre-Procedure Physical Assessment    Michaela Dorman MRN# [id number]   Age: 67 year old YOB: 1950      Date of Surgery: 12/22/2017  Location [facility]      Date of Exam 12/22/2017 Facility (Same day)       Home clinic: [facility]  Primary care provider: Phani Tapia         Active problem list:   Patient Active Problem List   Diagnosis     Enthesopathy of hip region     Family history of stroke (cerebrovascular)     Trochanteric bursitis     Advanced directives, counseling/discussion     Health Care Home     Baker's cyst of knee     Patella-femoral syndrome     Adjustment disorder with depressed mood     Essential hypertension     Malignant neoplasm of upper-outer quadrant of left female breast (H)     Encounter for antineoplastic chemotherapy     S/P bilateral mastectomy     Anxiety     Hyperlipidemia LDL goal <130     S/P reverse total shoulder arthroplasty, right     Prophylactic antibiotic for dental procedure indicated due to prior joint replacement     Chronic pain syndrome     Lumbar radiculopathy     Foraminal stenosis of lumbar region     Central stenosis of spinal canal     DDD (degenerative disc disease), lumbar            Medications (include herbals and vitamins):   Any Plavix use in the last 7 days?  No     Current Facility-Administered Medications   Medication     lidocaine 1 % 1 mL     lidocaine (LMX4) kit     sodium chloride (PF) 0.9% PF flush 3 mL     sodium chloride (PF) 0.9% PF flush 3 mL     ondansetron (ZOFRAN) injection 4 mg             Allergies:      Allergies   Allergen Reactions     No Known Drug Allergies      Allergy to Latex?  No  Allergy to tape?    No          Social History:     Social History   Substance Use Topics     Smoking status: Former Smoker     Packs/day: 3.00     Years: 10.00     Types: Cigarettes     Quit date: 12/1/1979     Smokeless tobacco: Never Used      Comment: approx. 3 packs daily     Alcohol  use 0.0 oz/week     0 Standard drinks or equivalent per week      Comment: daily glass of wine            Physical Exam:   All vitals have been reviewed  Blood pressure 135/80, temperature 98.2  F (36.8  C), temperature source Oral, resp. rate 16, SpO2 97 %, not currently breastfeeding.  Airway assessment:   Patient is able to open mouth wide  Patient is able to stick out tongue      Lungs:   No increased work of breathing, good air exchange, clear to auscultation bilaterally, no crackles or wheezing      Cardiovascular:   Normal apical impulse, regular rate and rhythm, normal S1 and S2, no S3 or S4, and no murmur noted           Lab / Radiology Results:   All laboratory data reviewed          Assessment:   Appropriately NPO  Chief complaint or anatomic assessment of involved area: Screening         Plan:   Moderate (conscious) sedation     Patient's active problems diagnostically and therapeutically optimized for the planned procedure  Risks, benefits, alternatives to sedation and blood explained and consent obtained  Risks, benefits, alternatives to procedure explained and consent obtained  P2 (patient with mild systemic disease)  Orders and progress notes are in the chart  Discharge from Phase 1 and / or Phase 2 recovery when patient meets criteria    I have reviewed the history and physical, lab finding(s), diagnostic data, medicaitons, and the plan for sedation.  I have determined this patient to be an appropriate candidate for the planned sedation / procedure and have reassessed the patient immediately prior to sedation / procedure.    I have personally and medically directed the administration of medications used.    JAYDE NOLEN MD

## 2017-12-27 ENCOUNTER — E-VISIT (OUTPATIENT)
Dept: FAMILY MEDICINE | Facility: CLINIC | Age: 67
End: 2017-12-27
Payer: COMMERCIAL

## 2017-12-27 ENCOUNTER — MYC MEDICAL ADVICE (OUTPATIENT)
Dept: FAMILY MEDICINE | Facility: OTHER | Age: 67
End: 2017-12-27

## 2017-12-27 DIAGNOSIS — F43.21 ADJUSTMENT DISORDER WITH DEPRESSED MOOD: ICD-10-CM

## 2017-12-27 DIAGNOSIS — R30.0 DYSURIA: Primary | ICD-10-CM

## 2017-12-27 PROCEDURE — 99444 ZZC PHYSICIAN ONLINE EVALUATION & MANAGEMENT SERVICE: CPT | Performed by: PHYSICIAN ASSISTANT

## 2017-12-27 RX ORDER — SULFAMETHOXAZOLE/TRIMETHOPRIM 800-160 MG
1 TABLET ORAL 2 TIMES DAILY
Qty: 20 TABLET | Refills: 0 | Status: SHIPPED | OUTPATIENT
Start: 2017-12-27 | End: 2017-12-28

## 2017-12-28 ENCOUNTER — MYC MEDICAL ADVICE (OUTPATIENT)
Dept: FAMILY MEDICINE | Facility: OTHER | Age: 67
End: 2017-12-28

## 2017-12-28 DIAGNOSIS — R30.0 DYSURIA: Primary | ICD-10-CM

## 2017-12-28 RX ORDER — CEFUROXIME AXETIL 250 MG/1
250 TABLET ORAL 2 TIMES DAILY
Qty: 14 TABLET | Refills: 0 | Status: SHIPPED | OUTPATIENT
Start: 2017-12-28 | End: 2018-01-16

## 2017-12-30 RX ORDER — VENLAFAXINE HYDROCHLORIDE 75 MG/1
75 CAPSULE, EXTENDED RELEASE ORAL DAILY
Qty: 90 CAPSULE | Refills: 0 | Status: SHIPPED | OUTPATIENT
Start: 2017-12-30 | End: 2018-02-06

## 2017-12-30 RX ORDER — VENLAFAXINE HYDROCHLORIDE 37.5 MG/1
37.5 CAPSULE, EXTENDED RELEASE ORAL DAILY
Qty: 90 CAPSULE | Refills: 0 | Status: SHIPPED | OUTPATIENT
Start: 2017-12-30 | End: 2018-02-06

## 2017-12-30 NOTE — TELEPHONE ENCOUNTER
Requested Prescriptions   Pending Prescriptions Disp Refills     venlafaxine (EFFEXOR XR) 37.5 MG 24 hr capsule 90 capsule 1     Sig: Take 1 capsule (37.5 mg) by mouth daily For one week then 2 daily for a week then 3 daily for 3-4 weeks.    Serotonin-Norepinephrine Reuptake Inhibitors  Failed    12/27/2017  9:42 AM       Failed - PHQ-9 score of less than 5 in past 6 months    The PHQ-9 criteria is meant to fail. It requires a PHQ-9 score review  PHQ-9 score:    PHQ-9 SCORE 11/30/2017   Total Score -   Total Score MyChart 5 (Mild depression)   Total Score 5                    Passed - Blood pressure under 140/90    BP Readings from Last 3 Encounters:   12/22/17 139/84   12/13/17 149/88   12/11/17 113/71                Passed - Recent or future visit with authorizing provider's specialty    Patient had office visit in the last year or has a visit in the next 30 days with authorizing provider.  See chart review.      11/30/2017         Passed - Patient is age 18 or older       Passed - No active pregnancy on record       Passed - Normal serum creatinine on file in past 12 months    Recent Labs   Lab Test  10/24/17   1025   CR  0.97            Passed - No positive pregnancy test in past 12 months       Passed - Recent (6 mo) or future visit with authorizing provider's specialty    Patient had office visit in the last 6 months or has a visit in the next 30 days with authorizing provider.  See chart review.             venlafaxine (EFFEXOR-XR) 75 MG 24 hr capsule 30 capsule      Sig: Take 1 capsule (75 mg) by mouth daily Along with 37.5 mg cap.    Serotonin-Norepinephrine Reuptake Inhibitors  Failed    12/27/2017  9:42 AM       Failed - PHQ-9 score of less than 5 in past 6 months    The PHQ-9 criteria is meant to fail. It requires a PHQ-9 score review         Passed - Blood pressure under 140/90    BP Readings from Last 3 Encounters:   12/22/17 139/84   12/13/17 149/88   12/11/17 113/71                Passed - Recent or  future visit with authorizing provider's specialty    Patient had office visit in the last year or has a visit in the next 30 days with authorizing provider.  See chart review.              Passed - Patient is age 18 or older       Passed - No active pregnancy on record       Passed - Normal serum creatinine on file in past 12 months    Recent Labs   Lab Test  10/24/17   1025   CR  0.97            Passed - No positive pregnancy test in past 12 months       Passed - Recent (6 mo) or future visit with authorizing provider's specialty    Patient had office visit in the last 6 months or has a visit in the next 30 days with authorizing provider.  See chart review.             Prescription approved per [facility] Refill Protocol.  Larry Meadows, RN, BSN

## 2018-01-04 ENCOUNTER — TELEPHONE (OUTPATIENT)
Dept: PALLIATIVE MEDICINE | Facility: CLINIC | Age: 68
End: 2018-01-04

## 2018-01-04 ENCOUNTER — OFFICE VISIT (OUTPATIENT)
Dept: NEUROSURGERY | Facility: CLINIC | Age: 68
End: 2018-01-04
Payer: COMMERCIAL

## 2018-01-04 VITALS — TEMPERATURE: 97.1 F | BODY MASS INDEX: 22.87 KG/M2 | WEIGHT: 142.3 LBS | HEIGHT: 66 IN

## 2018-01-04 DIAGNOSIS — M47.26 OTHER SPONDYLOSIS WITH RADICULOPATHY, LUMBAR REGION: Primary | ICD-10-CM

## 2018-01-04 PROCEDURE — 99213 OFFICE O/P EST LOW 20 MIN: CPT | Performed by: NEUROLOGICAL SURGERY

## 2018-01-04 ASSESSMENT — PAIN SCALES - GENERAL: PAINLEVEL: MILD PAIN (3)

## 2018-01-04 NOTE — MR AVS SNAPSHOT
After Visit Summary   1/4/2018    Michaela Dorman    MRN: [id number]           Patient Information     Date Of Birth          1950        Visit Information        Provider Department      1/4/2018 2:00 PM Lenny Gomes MD [facility]        Today's Diagnoses     Other spondylosis with radiculopathy, lumbar region    -  1      Care Instructions     Referral to [facility] for injection and comprehensive pain management.    Please call our clinic with any questions or concerns: [phone]            Follow-ups after your visit        Additional Services     PAIN MANAGEMENT REFERRAL       Your provider has referred you to: [facility]: [facility] [place] -    Reason for Referral: Left L5-S1 epidural steroid injection; Comprehensive Evaluation and Management    Please complete the following questions:    What is your diagnosis for the patient's pain?     Do you have any specific questions for the pain specialist? No    Are there any red flags that may impact the assessment or management of the patient? None    For any questions, contact the [facility] at [phone].     **ANY DIAGNOSTIC TESTS THAT ARE NOT IN EPIC SHOULD BE SENT TO THE PAIN CENTER**    REGARDING OPIOID MEDICATIONS:  We will always address appropriateness of opioid pain medications, but we generally will not automatically take on a prescribing role. When we do take on prescribing of opioids for chronic pain, it is in collaboration with the referring physician for an intermediate period of time (months), with an expectation that the primary physician or provider will assume the prescribing role if medications are effective at stable doses with demonstrated compliance.  Therefore, please do not assume that your prescribing responsibilities end on the day of pain clinic consultation.  Is this agreeable to you? YES    Please be aware that coverage of these services is subject to  the terms and limitations of your health insurance plan.  Call member services at your health plan with any benefit or coverage questions.      Please bring the following with you to your appointment:    (1) Any X-Rays, CTs or MRIs which have been performed.  Contact the facility where they were done to arrange for  prior to your scheduled appointment.    (2) List of current medications   (3) This referral request   (4) Any documents/labs given to you for this referral                  Your next 10 appointments already scheduled     Jan 16, 2018  1:00 PM CST   [facility]    [address] [facility] [address] [facility] [id number] [phone]            Jan 16, 2018  1:30 PM CST   Level 1 with NL INFUSION CHAIR 3   [facility] ([facility])    [address] [place] [address]  [place] [id number] [phone]              Who to contact     If you have questions or need follow up information about today's clinic visit or your schedule please contact [facility] directly at [phone].  Normal or non-critical lab and imaging results will be communicated to you by [employer] On[employer]ine[employer], letter or phone within 4 business days after the clinic has received the results. If you do not hear from us within 7 days, please contact the clinic through [employer] or phone. If you have a critical or abnormal lab result, we will notify you by phone as soon as possible.  Submit refill requests through [employer] or call your pharmacy and they will forward the refill request to us. Please allow 3 business days for your refill to be completed.          Additional Information About Your Visit        [employer] Information     [employer] gives you secure access to your electronic health record. If you see a primary care provider, you can also send messages to your care team and make appointments. If you have questions, please call  "your primary care clinic.  If you do not have a primary care provider, please call [phone] and they will assist you.        Care EveryWhere ID     This is your Care EveryWhere ID. This could be used by other organizations to access your [place] medical records  [id number]        Your Vitals Were     Temperature Height BMI (Body Mass Index)             97.1  F (36.2  C) (Temporal) 5' 6\" (1.676 m) 22.97 kg/m2          Blood Pressure from Last 3 Encounters:   12/22/17 139/84   12/13/17 149/88   12/11/17 113/71    Weight from Last 3 Encounters:   01/04/18 142 lb 4.8 oz (64.5 kg)   12/11/17 138 lb (62.6 kg)   12/08/17 139 lb 9.6 oz (63.3 kg)              We Performed the Following     PAIN MANAGEMENT REFERRAL        Primary Care Provider Office Phone # Fax #    Phani Tapia PA-C [phone]       [place] [address]        Equal Access to Services     [facility]: Hadii aad ku hadasho Soomaali, waaxda luqadaha, qaybta kaalmada adeegyada, wale allen . So [facility] [phone].    ATENCIÓN: Si habla español, tiene a ordoñez disposición servicios gratuitos de asistencia lingüística. Llame al [phone].    We comply with applicable federal civil rights laws and [place] laws. We do not discriminate on the basis of race, color, national origin, age, disability, sex, sexual orientation, or gender identity.            Thank you!     Thank you for choosing [facility]  for your care. Our goal is always to provide you with excellent care. Hearing back from our patients is one way we can continue to improve our services. Please take a few minutes to complete the written survey that you may receive in the mail after your visit with us. Thank you!             Your Updated Medication List - Protect others around you: Learn how to safely use, store and throw away your medicines at www.disposemymeds.org.          This list is accurate " as of: 1/4/18  2:25 PM.  Always use your most recent med list.                   Brand Name Dispense Instructions for use Diagnosis    atorvastatin 20 MG tablet    LIPITOR    90 tablet    Take 1 tablet (20 mg) by mouth daily    Hyperlipidemia LDL goal <130       cefuroxime 250 MG tablet    CEFTIN    14 tablet    Take 1 tablet (250 mg) by mouth 2 times daily    Dysuria       ciprofloxacin 250 MG tablet    CIPRO    90 tablet    Take 1 tablet (250 mg) by mouth daily    Chronic lower urinary tract infection       hydrochlorothiazide 25 MG tablet    HYDRODIURIL    90 tablet    TAKE ONE TABLET BY MOUTH ONCE DAILY    Essential hypertension       LORazepam 0.5 MG tablet    ATIVAN    15 tablet    Take 1 tablet (0.5 mg) by mouth every 4 hours as needed (Anxiety, Nausea/Vomiting or Sleep)    Malignant neoplasm of upper-outer quadrant of left female breast (H), Encounter for antineoplastic chemotherapy       * oxybutynin 5 MG tablet    DITROPAN    90 tablet    TAKE ONE-HALF TABLET BY MOUTH TWICE A DAY    Dysuria       * oxybutynin 5 MG tablet    DITROPAN    90 tablet    TAKE ONE-HALF TABLET BY MOUTH TWICE A DAY    Dysuria       oxyCODONE IR 5 MG tablet    ROXICODONE    20 tablet    Take 1 tablet (5 mg) by mouth every 6 hours as needed for pain    Chronic pain syndrome, Lumbar radiculopathy       oxyCODONE-acetaminophen 5-325 MG per tablet    PERCOCET    45 tablet    Take 1 tablet by mouth every 4 hours as needed for pain    Post-op pain, S/P reverse total shoulder arthroplasty, right, S/P bilateral mastectomy, Trochanteric bursitis, unspecified laterality       priLOSEC 20 MG CR capsule   Generic drug:  omeprazole      Take by mouth daily        prochlorperazine 10 MG tablet    COMPAZINE    30 tablet    Take 1 tablet (10 mg) by mouth every 6 hours as needed (Nausea/Vomiting)    Encounter for antineoplastic chemotherapy       TYLENOL EX ST ARTHRITIS PAIN 500 MG tablet   Generic drug:  acetaminophen      Take 500-1,000 mg by  mouth every 6 hours as needed for mild pain    Dermatitis due to sun       * venlafaxine 37.5 MG 24 hr capsule    EFFEXOR XR    90 capsule    Take 1 capsule (37.5 mg) by mouth daily With the 75 mg.    Adjustment disorder with depressed mood       * venlafaxine 75 MG 24 hr capsule    EFFEXOR-XR    90 capsule    Take 1 capsule (75 mg) by mouth daily Along with 37.5 mg cap.    Adjustment disorder with depressed mood       * Notice:  This list has 4 medication(s) that are the same as other medications prescribed for you. Read the directions carefully, and ask your doctor or other care provider to review them with you.

## 2018-01-04 NOTE — PROGRESS NOTES
Michaela Dorman is a 67 year old female who presents for evaluation of her chief complaint of left leg and low back pain. Symptoms have been present for about 1 year, but have become more persistent over the last 4 months. She describes severe pain that radiates down the lateral aspect of her left thigh and calf, as far as the ankle. She has severe ankle pain. She even received an in injection into the left ankle, with no symptomatic improvement. She has not had any epidural injections. She is unable to stand or participate in any significant activities. She denies bowel or bladder problems, or problems with balance or coordination.    Returns today for further follow-up.  Had several days of excellent pain relief after the injection, although the pain has returned.  Scoliosis x-rays showed that the deformity is all local in the caudal lumbar region.  Her overall activity level is fairly good, she takes half an oxycodone in the morning, then is able to walk for extended distances on the treadmill.    Past Medical History:   Diagnosis Date     Central stenosis of spinal canal 12/1/2017    lumbar      DDD (degenerative disc disease), lumbar 12/1/2017     Depressive disorder, not elsewhere classified      Esophageal reflux      ETOH abuse     in remission     Foraminal stenosis of lumbar region 12/1/2017    Complete lumbar spine left at various degrees and to a lesser extent the right as well.     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    Past Surgical History:   Procedure Laterality Date     ARTHROPLASTY SHOULDER Right 11/17/2015     ARTHROSCOPY KNEE  6/7/2012    Procedure:ARTHROSCOPY KNEE; right knee arthroscopy with partial lateral menisectomy; Surgeon:DAMIÁN MCALLISTER;  Location:[facility]     C LIGATE FALLOPIAN TUBE       C NONSPECIFIC PROCEDURE  1956    ankle fracture surgery     COLONOSCOPY N/A 12/22/2017    Procedure: COLONOSCOPY;  colonoscopy;  Surgeon: Chuck Chand MD;  [facility]     EXCISE MASS AXILLA Left 9/16/2016    Procedure: EXCISE MASS AXILLA;  Surgeon: Keyon Blanco MD;  [facility]     HC REMV CATARACT EXTRACAP,INSERT LENS  6/25/2009    Right eye     INSERT PORT VASCULAR ACCESS Right 10/7/2016    Procedure: INSERT PORT VASCULAR ACCESS;  Surgeon: Keyon Blanco MD;  [facility]     MASTECTOMY SIMPLE BILATERAL Bilateral 2/8/2017    Procedure: MASTECTOMY SIMPLE BILATERAL;  Surgeon: Keyon Blanco MD;  [facility]     OPEN REDUCTION INTERNAL FIXATION FOOT Right 3/20/2015    Procedure: OPEN REDUCTION INTERNAL FIXATION FOOT;  Surgeon: Javi Herrmann DPM;  [facility]     SHOULDER SURGERY Right 11/2015     Past Surgical History reviewed with patient during visit.    Current Outpatient Prescriptions   Medication     venlafaxine (EFFEXOR XR) 37.5 MG 24 hr capsule     venlafaxine (EFFEXOR-XR) 75 MG 24 hr capsule     cefuroxime (CEFTIN) 250 MG tablet     oxyCODONE-acetaminophen (PERCOCET) 5-325 MG per tablet     hydrochlorothiazide (HYDRODIURIL) 25 MG tablet     oxyCODONE IR (ROXICODONE) 5 MG tablet     oxybutynin (DITROPAN) 5 MG tablet     ciprofloxacin (CIPRO) 250 MG tablet     atorvastatin (LIPITOR) 20 MG tablet     prochlorperazine (COMPAZINE) 10 MG tablet     oxybutynin (DITROPAN) 5 MG tablet     LORazepam (ATIVAN) 0.5 MG tablet     acetaminophen (TYLENOL EX ST ARTHRITIS PAIN) 500 MG tablet     omeprazole (PRILOSEC) 20 MG capsule     No current facility-administered medications for this visit.        Allergies   Allergen Reactions     No Known Drug Allergies        Social History     Social History     Marital status:      Spouse name: ozzie     Number of children: 5     Years of education: N/A     Occupational History       "Homemaker     Social History Main Topics     Smoking status: Former Smoker     Packs/day: 3.00     Years: 10.00     Types: Cigarettes     Quit date: 12/1/1979     Smokeless tobacco: Never Used      Comment: approx. 3 packs daily     Alcohol use 0.0 oz/week     0 Standard drinks or equivalent per week      Comment: daily glass of wine     Drug use: No     Sexual activity: Yes     Partners: Male     Other Topics Concern     Caffeine Concern No     Hobby Hazards No     Sleep Concern No     Stress Concern Yes     Exercise Yes     Seat Belt Yes     Social History Narrative       Family History   Problem Relation Age of Onset     Hypertension Mother      Thyroid Disease Mother      CEREBROVASCULAR DISEASE Mother      Hypertension Father      CEREBROVASCULAR DISEASE Father      CANCER Father      skin     Thyroid Disease Sister      DIABETES Brother      type 2     Depression Sister      Breast Cancer Sister      age 47     CANCER Sister      skin     CANCER Brother      inside nose          ROS: 10 point ROS neg other than the symptoms noted above in the HPI.    Vital Signs: Temp 97.1  F (36.2  C) (Temporal)  Ht 1.676 m (5' 6\")  Wt 64.5 kg (142 lb 4.8 oz)  BMI 22.97 kg/m2    Examination:  Constitutional:  Alert, well nourished, NAD.  HEENT: Normocephalic, atraumatic.   Pulmonary:  Without shortness of breath, normal effort.   Lymph: no lymphadenopathy to low back or LE.   Integumentary: Skin is free of rashes or lesions.   Cardiovascular:  No pitting edema of BLE.    Psych: Normal affect, no apparent distress    Neurological:  Awake  Alert  Oriented x 3  Speech clear  Cranial nerves II - XII grossly intact  Motor exam   Hip Flexor:                Right: 5/5  Left:  5/5  Hip Adductor:             Right:  5/5  Left:  5/5  Hip Abductor:             Right:  5/5  Left:  5/5  Gastroc Soleus:        Right:  5/5  Left:  5/5  Tib/Ant:                      Right:  5/5  Left:  5/5  EHL:                          Right:  5/5  Left: "  5/5       Sensation normal to bilateral upper and lower extremities.    Reflexes are 2+ in the patellar and Achilles. There is no clonus. Downgoing Babinski.    Musculoskeletal:  Gait: Able to stand from a seated position. Normal non-antalgic, non-myelopathic gait.  Able to heel/toe walk without loss of balance  She is tender palpation of the lumbar paraspinous musculature.    Imaging:   MRI of the lumbar spine was reviewed in the office today. She does have multiple levels of disc degeneration and disc bulging from L2 through S1. She has multiple levels of moderate to severe foraminal stenosis from L4 through S1. Disc degeneration is most significant at L4-5 and L5-S1, where there is loss of disc height and erosion of the endplates.    Assessment/Plan:     Low back pain  Radicular left leg pain  Lumbar disc degeneration multilevel  Lumbar stenosis    Overall pain levels are improved  We will refer her for another epidural steroid injection  We will also refer her for comprehensive pain management  If pain persists, and she wishes to discuss surgery in the future, she can return to clinic

## 2018-01-04 NOTE — TELEPHONE ENCOUNTER
Pre-screening Questions for Radiology Injections:    Injection to be done at which interventional clinic site? [facility]    Procedure ordered by Dr. Gomes    Procedure ordered? Lumbar Epidural Steroid Injection    What insurance would patient like us to bill for this procedure? Medicare and BCBS      Worker's comp or MVA (motor vehicle accident) -Any injection DO NOT SCHEDULE and route to Jennifer Colon.      [employer] insurance - For SI joint injections, DO NOT SCHEDULE and route Nesha Pink.      [employer]- MBB's must be scheduled at LEAST two weeks apart      [employer] - Any injection besides hip/shoulder/knee joint DO NOT SCHEDULE and route to Nesha Pink. She will obtain PA and call pt back to schedule procedure or notify pt of denial.       [employer]-PA REQUIRED FOR NON-VIANNEY OR Joint injections    Any chance of pregnancy? NO   If YES, do NOT schedule and route to RN pool    Is an  needed? No     Patient has a drive home? (mandatory) YES:     Is patient taking any blood thinners (plavix, coumadin, jantoven, warfarin, heparin, pradaxa or dabigatran )? No   If hold needed, do NOT schedule, route to RN pool     Is patient taking any aspirin products? No     If more than 325mg/day do NOT schedule; route to RN pool     For CERVICAL procedures, hold all aspirin products for 6 days.      Does the patient have a bleeding or clotting disorder? No     If YES, okay to schedule AND route to RN nurse pool    **For any patients with platelet count <100, must be forwarded to provider**    Is patient diabetic?  No  If YES, have them bring their glucometer.    Does patient have an active infection or treated for one within the past week? No     Is patient currently taking any antibiotics?  Yes - . ciprofloxacin (CIPRO) 250 MG tablet    For patients on chronic, preventative, or prophylactic antibiotics, procedures may be scheduled.     For patients on antibiotics for active or recent infection:    Drs.  Lucy Santiago Burton, Snitzer-antibiotic course must have been completed for 4 days    Dr. Rodríguez-antibiotic course must have been completed for 7 days    Is patient currently taking any steroid medications? (i.e. Prednisone, Medrol)  No     For patients on steroid medications:    uLcy Mcdonald Burton, Snitzer-steroid course must have been completed for 4 days    -steroid course must have been completed for 7 days    Reviewed with patient:  If you are started on any steroids or antibiotics between now and your appointment, you must contact us because it may affect our ability to perform your procedure.  Yes    Is patient actively being treated for cancer or immunocompromised? No  If YES, do NOT schedule and route to RN pool     Are you able to get on and off an exam table with minimal or no assistance? Yes  If NO, do NOT schedule and route to RN pool    Are you able to roll over and lay on your stomach with minimal or no assistance? Yes  If NO, do NOT schedule and route to RN pool     Any allergies to contrast dye, iodine, shellfish, or numbing and steroid medications? No  If YES, route to RN pool AND add allergy information to appointment notes    Allergies: No known drug allergies      Has the patient had a flu shot or any other vaccinations within 7 days before or after the procedure.  No     Does patient have an MRI/CT?  YES: 11/2017  (SI joint, hip injections, lumbar sympathetic blocks, and stellate ganglion blocks do not require an MRI)    Was the MRI done w/in the last 3 years?  Yes    Was MRI done at [facility]? Yes      If not, where was it done? N/A       If MRI was not done at [facility], [facility] or [facility] do NOT schedule and route to nursing.  If pt has an imaging disc, the injection may be scheduled but pt has to bring disc to appt. If they show up w/out disc the injection cannot be done    Reminders (please tell patient if applicable):       Instructed pt to arrive 30 minutes  early for IV start if this is for a cervical procedure, ALL sympathetic (stellate ganglion, hypogastric, or lumbar sympathetic block) and all sedation procedures (RFA, spinal cord stimulation trials).  NO   -IVs are not routinely placed for Dr. Downing cervical cases   -Dr. Menendez: IVs for cervical ESIs and cervical TBDs (not CMBBs/facet inj)      If NPO for sedation, informed patient that it is okay to take medications with sips of water (except if they are to hold blood thinners).  NO   *DO take blood pressure medication if it is prescribed*      If this is for a cervical VIANNEY, informed patient that aspirin needs to be held for 6 days.   Not Applicable      For all patients not having spinal cord stimulator (SCS) trials or radiofrequency ablations (RFAs), informed patient:    IV sedation is not provided for this procedure.  If you feel that an oral anti-anxiety medication is needed, you can discuss this further with your referring provider or primary care provider.  The Pain Clinic provider will discuss specifics of what the procedure includes at your appointment.  Most procedures last 10-20 minutes.  We use numbing medications to help with any discomfort during the procedure.  NO      Do not schedule procedures requiring IV placement in the first appointment of the day or first appointment after lunch.       For patients 85 or older we recommend having an adult stay w/ them for the remainder of the day.       Does the patient have any questions?  NO  Josefa Worley  [facility]

## 2018-01-04 NOTE — PROGRESS NOTES
"Michaela Dorman is a 67 year old female who presents for:  Chief Complaint   Patient presents with     Neurologic Problem      scoliosis & x-ray results        Initial Vitals:  Temp 97.1  F (36.2  C) (Temporal)  Ht 5' 6\" (1.676 m)  Wt 142 lb 4.8 oz (64.5 kg)  BMI 22.97 kg/m2 Estimated body mass index is 22.97 kg/(m^2) as calculated from the following:    Height as of this encounter: 5' 6\" (1.676 m).    Weight as of this encounter: 142 lb 4.8 oz (64.5 kg).. Body surface area is 1.73 meters squared. BP completed using cuff size: NA (Not Taken)  Mild Pain (3)    Do you feel safe in your environment?  Yes  Do you need any refills today? No    Nursing Comments:         Serena Goodman CMA    "

## 2018-01-04 NOTE — LETTER
"    1/4/2018         RE: Michaela Dorman  [address]  [place] [address]        Dear Colleague,    Thank you for referring your patient, Michaela Dorman, to the [facility]. Please see a copy of my visit note below.    Michaela Dorman is a 67 year old female who presents for:  Chief Complaint   Patient presents with     Neurologic Problem      scoliosis & x-ray results        Initial Vitals:  Temp 97.1  F (36.2  C) (Temporal)  Ht 5' 6\" (1.676 m)  Wt 142 lb 4.8 oz (64.5 kg)  BMI 22.97 kg/m2 Estimated body mass index is 22.97 kg/(m^2) as calculated from the following:    Height as of this encounter: 5' 6\" (1.676 m).    Weight as of this encounter: 142 lb 4.8 oz (64.5 kg).. Body surface area is 1.73 meters squared. BP completed using cuff size: NA (Not Taken)  Mild Pain (3)    Do you feel safe in your environment?  Yes  Do you need any refills today? No    Nursing Comments:         Serena Goodman, NATHANIEL      Michaela Dorman is a 67 year old female who presents for evaluation of her chief complaint of left leg and low back pain. Symptoms have been present for about 1 year, but have become more persistent over the last 4 months. She describes severe pain that radiates down the lateral aspect of her left thigh and calf, as far as the ankle. She has severe ankle pain. She even received an in injection into the left ankle, with no symptomatic improvement. She has not had any epidural injections. She is unable to stand or participate in any significant activities. She denies bowel or bladder problems, or problems with balance or coordination.    Returns today for further follow-up.  Had several days of excellent pain relief after the injection, although the pain has returned.  Scoliosis x-rays showed that the deformity is all local in the caudal lumbar region.  Her overall activity level is fairly good, she takes half an oxycodone in the morning, then is able to walk for extended distances on the " treadmill.    Past Medical History:   Diagnosis Date     Central stenosis of spinal canal 12/1/2017    lumbar      DDD (degenerative disc disease), lumbar 12/1/2017     Depressive disorder, not elsewhere classified      Esophageal reflux      ETOH abuse     in remission     Foraminal stenosis of lumbar region 12/1/2017    Complete lumbar spine left at various degrees and to a lesser extent the right as well.     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    Past Surgical History:   Procedure Laterality Date     ARTHROPLASTY SHOULDER Right 11/17/2015     ARTHROSCOPY KNEE  6/7/2012    Procedure:ARTHROSCOPY KNEE; right knee arthroscopy with partial lateral menisectomy; Surgeon:DAMIÁN MCALLISTER; Location:[facility]     C LIGATE FALLOPIAN TUBE       C NONSPECIFIC PROCEDURE  1956    ankle fracture surgery     COLONOSCOPY N/A 12/22/2017    Procedure: COLONOSCOPY;  colonoscopy;  Surgeon: Chuck Chand MD;  [facility]     EXCISE MASS AXILLA Left 9/16/2016    Procedure: EXCISE MASS AXILLA;  Surgeon: Keyon Blanco MD;  [facility]     HC REMV CATARACT EXTRACAP,INSERT LENS  6/25/2009    Right eye     INSERT PORT VASCULAR ACCESS Right 10/7/2016    Procedure: INSERT PORT VASCULAR ACCESS;  Surgeon: Keyon Blanco MD;  [facility]     MASTECTOMY SIMPLE BILATERAL Bilateral 2/8/2017    Procedure: MASTECTOMY SIMPLE BILATERAL;  Surgeon: Keyon Blanco MD;  [facility]     OPEN REDUCTION INTERNAL FIXATION FOOT Right 3/20/2015    Procedure: OPEN REDUCTION INTERNAL FIXATION FOOT;  Surgeon: Javi Herrmann DPM;  [facility]: [facility]     SHOULDER SURGERY Right 11/2015     Past Surgical History reviewed with patient during visit.    Current Outpatient Prescriptions   Medication      venlafaxine (EFFEXOR XR) 37.5 MG 24 hr capsule     venlafaxine (EFFEXOR-XR) 75 MG 24 hr capsule     cefuroxime (CEFTIN) 250 MG tablet     oxyCODONE-acetaminophen (PERCOCET) 5-325 MG per tablet     hydrochlorothiazide (HYDRODIURIL) 25 MG tablet     oxyCODONE IR (ROXICODONE) 5 MG tablet     oxybutynin (DITROPAN) 5 MG tablet     ciprofloxacin (CIPRO) 250 MG tablet     atorvastatin (LIPITOR) 20 MG tablet     prochlorperazine (COMPAZINE) 10 MG tablet     oxybutynin (DITROPAN) 5 MG tablet     LORazepam (ATIVAN) 0.5 MG tablet     acetaminophen (TYLENOL EX ST ARTHRITIS PAIN) 500 MG tablet     omeprazole (PRILOSEC) 20 MG capsule     No current facility-administered medications for this visit.        Allergies   Allergen Reactions     No Known Drug Allergies        Social History     Social History     Marital status:      Spouse name: ozzie     Number of children: 5     Years of education: N/A     Occupational History      Homemaker     Social History Main Topics     Smoking status: Former Smoker     Packs/day: 3.00     Years: 10.00     Types: Cigarettes     Quit date: 12/1/1979     Smokeless tobacco: Never Used      Comment: approx. 3 packs daily     Alcohol use 0.0 oz/week     0 Standard drinks or equivalent per week      Comment: daily glass of wine     Drug use: No     Sexual activity: Yes     Partners: Male     Other Topics Concern     Caffeine Concern No     Hobby Hazards No     Sleep Concern No     Stress Concern Yes     Exercise Yes     Seat Belt Yes     Social History Narrative       Family History   Problem Relation Age of Onset     Hypertension Mother      Thyroid Disease Mother      CEREBROVASCULAR DISEASE Mother      Hypertension Father      CEREBROVASCULAR DISEASE Father      CANCER Father      skin     Thyroid Disease Sister      DIABETES Brother      type 2     Depression Sister      Breast Cancer Sister      age 47     CANCER Sister      skin     CANCER Brother      inside nose          ROS: 10 point  "ROS neg other than the symptoms noted above in the HPI.    Vital Signs: Temp 97.1  F (36.2  C) (Temporal)  Ht 1.676 m (5' 6\")  Wt 64.5 kg (142 lb 4.8 oz)  BMI 22.97 kg/m2    Examination:  Constitutional:  Alert, well nourished, NAD.  HEENT: Normocephalic, atraumatic.   Pulmonary:  Without shortness of breath, normal effort.   Lymph: no lymphadenopathy to low back or LE.   Integumentary: Skin is free of rashes or lesions.   Cardiovascular:  No pitting edema of BLE.    Psych: Normal affect, no apparent distress    Neurological:  Awake  Alert  Oriented x 3  Speech clear  Cranial nerves II - XII grossly intact  Motor exam   Hip Flexor:                Right: 5/5  Left:  5/5  Hip Adductor:             Right:  5/5  Left:  5/5  Hip Abductor:             Right:  5/5  Left:  5/5  Gastroc Soleus:        Right:  5/5  Left:  5/5  Tib/Ant:                      Right:  5/5  Left:  5/5  EHL:                          Right:  5/5  Left:  5/5       Sensation normal to bilateral upper and lower extremities.    Reflexes are 2+ in the patellar and Achilles. There is no clonus. Downgoing Babinski.    Musculoskeletal:  Gait: Able to stand from a seated position. Normal non-antalgic, non-myelopathic gait.  Able to heel/toe walk without loss of balance  She is tender palpation of the lumbar paraspinous musculature.    Imaging:   MRI of the lumbar spine was reviewed in the office today. She does have multiple levels of disc degeneration and disc bulging from L2 through S1. She has multiple levels of moderate to severe foraminal stenosis from L4 through S1. Disc degeneration is most significant at L4-5 and L5-S1, where there is loss of disc height and erosion of the endplates.    Assessment/Plan:     Low back pain  Radicular left leg pain  Lumbar disc degeneration multilevel  Lumbar stenosis    Overall pain levels are improved  We will refer her for another epidural steroid injection  We will also refer her for comprehensive pain " management  If pain persists, and she wishes to discuss surgery in the future, she can return to clinic    Again, thank you for allowing me to participate in the care of your patient.        Sincerely,        Lenny Gomes MD

## 2018-01-04 NOTE — PATIENT INSTRUCTIONS
Referral to [facility] for injection and comprehensive pain management.    Please call our clinic with any questions or concerns: [phone]

## 2018-01-13 ENCOUNTER — MYC MEDICAL ADVICE (OUTPATIENT)
Dept: FAMILY MEDICINE | Facility: OTHER | Age: 68
End: 2018-01-13

## 2018-01-13 DIAGNOSIS — F43.21 ADJUSTMENT DISORDER WITH DEPRESSED MOOD: Primary | ICD-10-CM

## 2018-01-15 RX ORDER — FLUOXETINE 40 MG/1
40 CAPSULE ORAL DAILY
Qty: 30 CAPSULE | Refills: 1 | Status: SHIPPED | OUTPATIENT
Start: 2018-01-15 | End: 2018-04-26

## 2018-01-16 ENCOUNTER — ONCOLOGY VISIT (OUTPATIENT)
Dept: ONCOLOGY | Facility: CLINIC | Age: 68
End: 2018-01-16
Payer: COMMERCIAL

## 2018-01-16 ENCOUNTER — TELEPHONE (OUTPATIENT)
Dept: ONCOLOGY | Facility: CLINIC | Age: 68
End: 2018-01-16

## 2018-01-16 ENCOUNTER — INFUSION THERAPY VISIT (OUTPATIENT)
Dept: INFUSION THERAPY | Facility: CLINIC | Age: 68
End: 2018-01-16
Attending: PHYSICIAN ASSISTANT
Payer: MEDICARE

## 2018-01-16 VITALS
RESPIRATION RATE: 16 BRPM | WEIGHT: 140.3 LBS | SYSTOLIC BLOOD PRESSURE: 121 MMHG | HEIGHT: 66 IN | DIASTOLIC BLOOD PRESSURE: 66 MMHG | HEART RATE: 103 BPM | TEMPERATURE: 97.3 F | BODY MASS INDEX: 22.55 KG/M2 | OXYGEN SATURATION: 96 %

## 2018-01-16 DIAGNOSIS — C50.412 MALIGNANT NEOPLASM OF UPPER-OUTER QUADRANT OF LEFT BREAST IN FEMALE, ESTROGEN RECEPTOR NEGATIVE (H): Primary | ICD-10-CM

## 2018-01-16 DIAGNOSIS — F43.21 ADJUSTMENT DISORDER WITH DEPRESSED MOOD: ICD-10-CM

## 2018-01-16 DIAGNOSIS — Z51.11 ENCOUNTER FOR ANTINEOPLASTIC CHEMOTHERAPY: Primary | ICD-10-CM

## 2018-01-16 DIAGNOSIS — I10 ESSENTIAL HYPERTENSION: ICD-10-CM

## 2018-01-16 DIAGNOSIS — C50.412 MALIGNANT NEOPLASM OF UPPER-OUTER QUADRANT OF LEFT BREAST IN FEMALE, ESTROGEN RECEPTOR NEGATIVE (H): ICD-10-CM

## 2018-01-16 DIAGNOSIS — E78.5 HYPERLIPIDEMIA LDL GOAL <130: ICD-10-CM

## 2018-01-16 DIAGNOSIS — Z17.1 MALIGNANT NEOPLASM OF UPPER-OUTER QUADRANT OF LEFT BREAST IN FEMALE, ESTROGEN RECEPTOR NEGATIVE (H): ICD-10-CM

## 2018-01-16 DIAGNOSIS — Z17.1 MALIGNANT NEOPLASM OF UPPER-OUTER QUADRANT OF LEFT BREAST IN FEMALE, ESTROGEN RECEPTOR NEGATIVE (H): Primary | ICD-10-CM

## 2018-01-16 LAB
ALBUMIN SERPL-MCNC: 4.1 G/DL (ref 3.4–5)
ALP SERPL-CCNC: 55 U/L (ref 40–150)
ALT SERPL W P-5'-P-CCNC: 26 U/L (ref 0–50)
ANION GAP SERPL CALCULATED.3IONS-SCNC: 9 MMOL/L (ref 3–14)
AST SERPL W P-5'-P-CCNC: 21 U/L (ref 0–45)
BASOPHILS # BLD AUTO: 0 10E9/L (ref 0–0.2)
BASOPHILS NFR BLD AUTO: 0.5 %
BILIRUB SERPL-MCNC: 0.4 MG/DL (ref 0.2–1.3)
BUN SERPL-MCNC: 25 MG/DL (ref 7–30)
CALCIUM SERPL-MCNC: 10.2 MG/DL (ref 8.5–10.1)
CANCER AG27-29 SERPL-ACNC: 18 U/ML (ref 0–39)
CHLORIDE SERPL-SCNC: 102 MMOL/L (ref 94–109)
CO2 SERPL-SCNC: 28 MMOL/L (ref 20–32)
CREAT SERPL-MCNC: 1.01 MG/DL (ref 0.52–1.04)
DIFFERENTIAL METHOD BLD: ABNORMAL
EOSINOPHIL # BLD AUTO: 0.1 10E9/L (ref 0–0.7)
EOSINOPHIL NFR BLD AUTO: 1.8 %
ERYTHROCYTE [DISTWIDTH] IN BLOOD BY AUTOMATED COUNT: 14.9 % (ref 10–15)
GFR SERPL CREATININE-BSD FRML MDRD: 54 ML/MIN/1.7M2
GLUCOSE SERPL-MCNC: 85 MG/DL (ref 70–99)
HCT VFR BLD AUTO: 36.8 % (ref 35–47)
HGB BLD-MCNC: 11.8 G/DL (ref 11.7–15.7)
IMM GRANULOCYTES # BLD: 0 10E9/L (ref 0–0.4)
IMM GRANULOCYTES NFR BLD: 0.2 %
LYMPHOCYTES # BLD AUTO: 1.4 10E9/L (ref 0.8–5.3)
LYMPHOCYTES NFR BLD AUTO: 24.5 %
MCH RBC QN AUTO: 32.2 PG (ref 26.5–33)
MCHC RBC AUTO-ENTMCNC: 32.1 G/DL (ref 31.5–36.5)
MCV RBC AUTO: 100 FL (ref 78–100)
MONOCYTES # BLD AUTO: 0.4 10E9/L (ref 0–1.3)
MONOCYTES NFR BLD AUTO: 7.2 %
NEUTROPHILS # BLD AUTO: 3.6 10E9/L (ref 1.6–8.3)
NEUTROPHILS NFR BLD AUTO: 65.8 %
PLATELET # BLD AUTO: 229 10E9/L (ref 150–450)
POTASSIUM SERPL-SCNC: 3.6 MMOL/L (ref 3.4–5.3)
PROT SERPL-MCNC: 7.5 G/DL (ref 6.8–8.8)
RBC # BLD AUTO: 3.67 10E12/L (ref 3.8–5.2)
SODIUM SERPL-SCNC: 139 MMOL/L (ref 133–144)
WBC # BLD AUTO: 5.5 10E9/L (ref 4–11)

## 2018-01-16 PROCEDURE — 36591 DRAW BLOOD OFF VENOUS DEVICE: CPT

## 2018-01-16 PROCEDURE — 85025 COMPLETE CBC W/AUTO DIFF WBC: CPT | Performed by: INTERNAL MEDICINE

## 2018-01-16 PROCEDURE — 25000128 H RX IP 250 OP 636: Performed by: INTERNAL MEDICINE

## 2018-01-16 PROCEDURE — 86300 IMMUNOASSAY TUMOR CA 15-3: CPT | Performed by: INTERNAL MEDICINE

## 2018-01-16 PROCEDURE — 99215 OFFICE O/P EST HI 40 MIN: CPT | Performed by: INTERNAL MEDICINE

## 2018-01-16 PROCEDURE — 80053 COMPREHEN METABOLIC PANEL: CPT | Performed by: INTERNAL MEDICINE

## 2018-01-16 RX ORDER — HEPARIN SODIUM (PORCINE) LOCK FLUSH IV SOLN 100 UNIT/ML 100 UNIT/ML
500 SOLUTION INTRAVENOUS EVERY 8 HOURS
Status: CANCELLED
Start: 2018-01-16

## 2018-01-16 RX ORDER — HEPARIN SODIUM (PORCINE) LOCK FLUSH IV SOLN 100 UNIT/ML 100 UNIT/ML
500 SOLUTION INTRAVENOUS EVERY 8 HOURS
Status: DISCONTINUED | OUTPATIENT
Start: 2018-01-16 | End: 2018-01-16 | Stop reason: HOSPADM

## 2018-01-16 RX ADMIN — SODIUM CHLORIDE, PRESERVATIVE FREE 500 UNITS: 5 INJECTION INTRAVENOUS at 13:57

## 2018-01-16 ASSESSMENT — PAIN SCALES - GENERAL: PAINLEVEL: NO PAIN (0)

## 2018-01-16 NOTE — PROGRESS NOTES
Hematology/ Oncology Follow-up Visit:  Jan 16, 2018    Reason for Visit:   Chief Complaint   Patient presents with     Oncology Clinic Visit     6 week follow up for Malignant neoplasm of upper-outer quadrant of left breast in female, estrogen receptor negative     Care Plan     Possible start of Nerlynx       Oncologic History:  Malignant neoplasm of upper-outer quadrant of left female breast (H)  Michaela Dorman  initially felt a mass in her left axilla. She then underwent mammogram which showed dense breasts with no suspicious lesions in 4/2016. She was then seen by surgery and underwent excisional LN biopsy that showed metastatic high-grade poorly differentiated carcinoma with a tumor size of 2.5 cm. Immunohistochemistry was done for ER/CA receptors that came back both negative. HER-2/boubacar was amplified by FISH. She then underwent MRI of the breast on 9/26/2016 that showed suspicious abnormality with multicentric left sided breast cancer that involved the left lateral breast form the nipple to the chest wall. A PET scan was obtained on 10/5/2016 that showed a hypermetabolic opacity in the left axilla wihtout other pathological activity. It was then recommended that she undergo neoadjuvant chemotherapy with Cytoxan and Doxorubicin that will be followed by Taxol, Herceptin. Patient initiated treatment on 10/11/2016.         Interval History:  Patient is here today for follow-up.  She has been feeling well without any recent complaints of weight loss or night sweats or fever or chills.  She denies any bony aches or pains.  She denies any nausea or vomiting or diarrhea.    Review Of Systems:  Constitutional: Negative for fever, chills, and night sweats.  Skin: negative.  Eyes: negative.  Ears/Nose/Throat: negative.  Respiratory: No shortness of breath, dyspnea on exertion, cough, or hemoptysis.  Cardiovascular: negative.  Gastrointestinal: negative.  Genitourinary: negative.  Musculoskeletal: negative.  Neurologic:  negative.  Psychiatric: negative.  Hematologic/Lymphatic/Immunologic: negative.  Endocrine: negative.    All other ROS negative unless mentioned in interval history.    Past medical, social, surgical, and family histories reviewed.    Allergies:  Allergies as of 01/16/2018 - Oskar as Reviewed 01/16/2018   Allergen Reaction Noted     No known drug allergies  07/05/2002       Current Medications:  Current Outpatient Prescriptions   Medication Sig Dispense Refill     FLUoxetine (PROZAC) 40 MG capsule Take 1 capsule (40 mg) by mouth daily 30 capsule 1     venlafaxine (EFFEXOR XR) 37.5 MG 24 hr capsule Take 1 capsule (37.5 mg) by mouth daily With the 75 mg. 90 capsule 0     venlafaxine (EFFEXOR-XR) 75 MG 24 hr capsule Take 1 capsule (75 mg) by mouth daily Along with 37.5 mg cap. 90 capsule 0     oxyCODONE-acetaminophen (PERCOCET) 5-325 MG per tablet Take 1 tablet by mouth every 4 hours as needed for pain 45 tablet 0     hydrochlorothiazide (HYDRODIURIL) 25 MG tablet TAKE ONE TABLET BY MOUTH ONCE DAILY 90 tablet 1     oxyCODONE IR (ROXICODONE) 5 MG tablet Take 1 tablet (5 mg) by mouth every 6 hours as needed for pain 20 tablet 0     oxybutynin (DITROPAN) 5 MG tablet TAKE ONE-HALF TABLET BY MOUTH TWICE A DAY 90 tablet 1     ciprofloxacin (CIPRO) 250 MG tablet Take 1 tablet (250 mg) by mouth daily 90 tablet 1     atorvastatin (LIPITOR) 20 MG tablet Take 1 tablet (20 mg) by mouth daily 90 tablet 1     prochlorperazine (COMPAZINE) 10 MG tablet Take 1 tablet (10 mg) by mouth every 6 hours as needed (Nausea/Vomiting) 30 tablet 3     oxybutynin (DITROPAN) 5 MG tablet TAKE ONE-HALF TABLET BY MOUTH TWICE A DAY 90 tablet 0     LORazepam (ATIVAN) 0.5 MG tablet Take 1 tablet (0.5 mg) by mouth every 4 hours as needed (Anxiety, Nausea/Vomiting or Sleep) 15 tablet 0     acetaminophen (TYLENOL EX ST ARTHRITIS PAIN) 500 MG tablet Take 500-1,000 mg by mouth every 6 hours as needed for mild pain       omeprazole (PRILOSEC) 20 MG capsule Take  "by mouth daily          Physical Exam:  /66 (BP Location: Right arm, Patient Position: Chair, Cuff Size: Adult Regular)  Pulse 103  Temp 97.3  F (36.3  C) (Temporal)  Resp 16  Ht 1.676 m (5' 6\")  Wt 63.6 kg (140 lb 4.8 oz)  SpO2 96%  BMI 22.65 kg/m2  Wt Readings from Last 12 Encounters:   01/16/18 63.6 kg (140 lb 4.8 oz)   01/04/18 64.5 kg (142 lb 4.8 oz)   12/11/17 62.6 kg (138 lb)   12/08/17 63.3 kg (139 lb 9.6 oz)   12/05/17 62.1 kg (136 lb 14.4 oz)   11/30/17 61.2 kg (135 lb)   11/14/17 63.2 kg (139 lb 6.4 oz)   10/24/17 65.4 kg (144 lb 3.2 oz)   10/10/17 65.8 kg (145 lb)   10/03/17 65.8 kg (145 lb)   10/03/17 65.8 kg (145 lb)   10/02/17 63.5 kg (140 lb)     ECOG performance status: 0  GENERAL APPEARANCE: Healthy, alert and in no acute distress.  HEENT: Sclerae anicteric. PERRLA. Oropharynx without ulcers, lesions, or thrush.  NECK: Supple. No asymmetry or masses.  LYMPHATICS: No palpable cervical, supraclavicular, axillary, or inguinal lymphadenopathy.  RESP: Lungs clear to auscultation bilaterally without rales, rhonchi or wheezes.  CARDIOVASCULAR: Regular rate and rhythm. Normal S1, S2; no S3 or S4. No murmur, gallop, or rub.  ABDOMEN: Soft, nontender. Bowel sounds normal. No palpable organomegaly or masses.  MUSCULOSKELETAL: Extremities without gross deformities noted. No edema of bilateral lower extremities.  SKIN: No suspicious lesions or rashes.  NEURO: Alert and oriented x 3. Cranial nerves II-XII grossly intact.  PSYCHIATRIC: Mentation and affect appear normal.    Laboratory/Imaging Studies:  No visits with results within 2 Week(s) from this visit.  Latest known visit with results is:    Oncology Visit on 10/24/2017   Component Date Value Ref Range Status     WBC 10/24/2017 5.6  4.0 - 11.0 10e9/L Final     RBC Count 10/24/2017 3.66* 3.8 - 5.2 10e12/L Final     Hemoglobin 10/24/2017 11.7  11.7 - 15.7 g/dL Final     Hematocrit 10/24/2017 37.3  35.0 - 47.0 % Final     MCV 10/24/2017 102* 78 - " 100 fl Final     MCH 10/24/2017 32.0  26.5 - 33.0 pg Final     MCHC 10/24/2017 31.4* 31.5 - 36.5 g/dL Final     RDW 10/24/2017 14.7  10.0 - 15.0 % Final     Platelet Count 10/24/2017 294  150 - 450 10e9/L Final     Sodium 10/24/2017 139  133 - 144 mmol/L Final     Potassium 10/24/2017 3.9  3.4 - 5.3 mmol/L Final     Chloride 10/24/2017 102  94 - 109 mmol/L Final     Carbon Dioxide 10/24/2017 27  20 - 32 mmol/L Final     Anion Gap 10/24/2017 10  3 - 14 mmol/L Final     Glucose 10/24/2017 90  70 - 99 mg/dL Final     Urea Nitrogen 10/24/2017 28  7 - 30 mg/dL Final     Creatinine 10/24/2017 0.97  0.52 - 1.04 mg/dL Final     GFR Estimate 10/24/2017 57* >60 mL/min/1.7m2 Final    Non  GFR Calc     GFR Estimate If Black 10/24/2017 69  >60 mL/min/1.7m2 Final    African American GFR Calc     Calcium 10/24/2017 9.6  8.5 - 10.1 mg/dL Final     Bilirubin Total 10/24/2017 0.3  0.2 - 1.3 mg/dL Final     Albumin 10/24/2017 4.4  3.4 - 5.0 g/dL Final     Protein Total 10/24/2017 7.5  6.8 - 8.8 g/dL Final     Alkaline Phosphatase 10/24/2017 59  40 - 150 U/L Final     ALT 10/24/2017 33  0 - 50 U/L Final     AST 10/24/2017 28  0 - 45 U/L Final        No results found for this or any previous visit (from the past 744 hour(s)).    Assessment and plan:    C50.412,  Z17.1) Malignant neoplasm of upper-outer quadrant of left breast in female, estrogen receptor negative (H)  (primary encounter diagnosis)  Patient concluded anti-HER-2/boubacar therapy with Herceptin. We talked about extended anti-HER-2/boubacar therapy with Neratinib. Neratinib was approved based on the ExteNET trial, a multicentre, randomised, double-blind, placebo-controlled trial of neratinib following adjuvant trastuzumab treatment. In total, 2840 women with early-stage HER2-positive breast cancer and within two years of completing adjuvant trastuzumab were randomised to receive either neratinib (1420 patients) or placebo (1420 patients) for one year. After two years,  disease free survival was 94.2% in patients treated with neratinib compared with 91.9% in those receiving placebo (HR 0.66; 95% CI: 0.49, 0.90, p = 0.008). The most common adverse reactions (>5%) were diarrhoea, nausea, abdominal pain, fatigue, vomiting, rash, stomatitis, decreased appetite, muscle spasms, dyspepsia, AST or ALT increase, nail disorder, dry skin, abdominal distention, weight loss, and urinary tract infection. The most common adverse reaction leading to discontinuation was diarrhoea, observed in 16.8% of neratinib-treated patients. Hepatotoxicity or increases in liver transaminases led to drug discontinuation in 1.7% of neratinib-treated patients. The recommended dose is 240 mg (6 tablets) given orally once daily with food, continuously for one year. Antidiarrheal prophylaxis should be initiated with the first neratinib dose and continued during the first 2 cycles (56 days) of treatment and as needed thereafter. .     (I10) Essential hypertension  Patient's blood pressure has been well controlled. She is in hydrochlorothiazide 25 mg orally daily.     (E78.5) Hyperlipidemia LDL goal <130  Patient currently on Lipitor 20 mg orally daily.     (F43.21) Adjustment disorder with depressed mood  She is currently on Effexor XR 37.5 mg orally daily.     The patient is ready to learn, no apparent learning barriers were identified.  Diagnosis and treatment plans were explained to the patient. The patient expressed understanding of the content. The patient asked appropriate questions. The patient questions were answered to her satisfaction.    Time spent 40 minutes with over 50% of the time in counseling and coordination of care including discussion of management of HER-2/boubacar positive breast cancer, follow-up and prognosis.    Chart documentation with Dragon Voice recognition Software. Although reviewed after completion, some words and grammatical errors may remain.

## 2018-01-16 NOTE — LETTER
1/16/2018         RE: Michaela Dorman  [address]  [facility] [address]        Dear Colleague,    Thank you for referring your patient, Michaela Dorman, to the [facility]. Please see a copy of my visit note below.    Hematology/ Oncology Follow-up Visit:  Jan 16, 2018    Reason for Visit:   Chief Complaint   Patient presents with     Oncology Clinic Visit     6 week follow up for Malignant neoplasm of upper-outer quadrant of left breast in female, estrogen receptor negative     Care Plan     Possible start of Nerlynx       Oncologic History:  Malignant neoplasm of upper-outer quadrant of left female breast (H)  Michaela Dorman  initially felt a mass in her left axilla. She then underwent mammogram which showed dense breasts with no suspicious lesions in 4/2016. She was then seen by surgery and underwent excisional LN biopsy that showed metastatic high-grade poorly differentiated carcinoma with a tumor size of 2.5 cm. Immunohistochemistry was done for ER/DC receptors that came back both negative. HER-2/boubacar was amplified by FISH. She then underwent MRI of the breast on 9/26/2016 that showed suspicious abnormality with multicentric left sided breast cancer that involved the left lateral breast form the nipple to the chest wall. A PET scan was obtained on 10/5/2016 that showed a hypermetabolic opacity in the left axilla wihtout other pathological activity. It was then recommended that she undergo neoadjuvant chemotherapy with Cytoxan and Doxorubicin that will be followed by Taxol, Herceptin. Patient initiated treatment on 10/11/2016.         Interval History:  Patient is here today for follow-up.  She has been feeling well without any recent complaints of weight loss or night sweats or fever or chills.  She denies any bony aches or pains.  She denies any nausea or vomiting or diarrhea.    Review Of Systems:  Constitutional: Negative for fever, chills, and night sweats.  Skin:  negative.  Eyes: negative.  Ears/Nose/Throat: negative.  Respiratory: No shortness of breath, dyspnea on exertion, cough, or hemoptysis.  Cardiovascular: negative.  Gastrointestinal: negative.  Genitourinary: negative.  Musculoskeletal: negative.  Neurologic: negative.  Psychiatric: negative.  Hematologic/Lymphatic/Immunologic: negative.  Endocrine: negative.    All other ROS negative unless mentioned in interval history.    Past medical, social, surgical, and family histories reviewed.    Allergies:  Allergies as of 01/16/2018 - Oskar as Reviewed 01/16/2018   Allergen Reaction Noted     No known drug allergies  07/05/2002       Current Medications:  Current Outpatient Prescriptions   Medication Sig Dispense Refill     FLUoxetine (PROZAC) 40 MG capsule Take 1 capsule (40 mg) by mouth daily 30 capsule 1     venlafaxine (EFFEXOR XR) 37.5 MG 24 hr capsule Take 1 capsule (37.5 mg) by mouth daily With the 75 mg. 90 capsule 0     venlafaxine (EFFEXOR-XR) 75 MG 24 hr capsule Take 1 capsule (75 mg) by mouth daily Along with 37.5 mg cap. 90 capsule 0     oxyCODONE-acetaminophen (PERCOCET) 5-325 MG per tablet Take 1 tablet by mouth every 4 hours as needed for pain 45 tablet 0     hydrochlorothiazide (HYDRODIURIL) 25 MG tablet TAKE ONE TABLET BY MOUTH ONCE DAILY 90 tablet 1     oxyCODONE IR (ROXICODONE) 5 MG tablet Take 1 tablet (5 mg) by mouth every 6 hours as needed for pain 20 tablet 0     oxybutynin (DITROPAN) 5 MG tablet TAKE ONE-HALF TABLET BY MOUTH TWICE A DAY 90 tablet 1     ciprofloxacin (CIPRO) 250 MG tablet Take 1 tablet (250 mg) by mouth daily 90 tablet 1     atorvastatin (LIPITOR) 20 MG tablet Take 1 tablet (20 mg) by mouth daily 90 tablet 1     prochlorperazine (COMPAZINE) 10 MG tablet Take 1 tablet (10 mg) by mouth every 6 hours as needed (Nausea/Vomiting) 30 tablet 3     oxybutynin (DITROPAN) 5 MG tablet TAKE ONE-HALF TABLET BY MOUTH TWICE A DAY 90 tablet 0     LORazepam (ATIVAN) 0.5 MG tablet Take 1 tablet (0.5  "mg) by mouth every 4 hours as needed (Anxiety, Nausea/Vomiting or Sleep) 15 tablet 0     acetaminophen (TYLENOL EX ST ARTHRITIS PAIN) 500 MG tablet Take 500-1,000 mg by mouth every 6 hours as needed for mild pain       omeprazole (PRILOSEC) 20 MG capsule Take by mouth daily          Physical Exam:  /66 (BP Location: Right arm, Patient Position: Chair, Cuff Size: Adult Regular)  Pulse 103  Temp 97.3  F (36.3  C) (Temporal)  Resp 16  Ht 1.676 m (5' 6\")  Wt 63.6 kg (140 lb 4.8 oz)  SpO2 96%  BMI 22.65 kg/m2  Wt Readings from Last 12 Encounters:   01/16/18 63.6 kg (140 lb 4.8 oz)   01/04/18 64.5 kg (142 lb 4.8 oz)   12/11/17 62.6 kg (138 lb)   12/08/17 63.3 kg (139 lb 9.6 oz)   12/05/17 62.1 kg (136 lb 14.4 oz)   11/30/17 61.2 kg (135 lb)   11/14/17 63.2 kg (139 lb 6.4 oz)   10/24/17 65.4 kg (144 lb 3.2 oz)   10/10/17 65.8 kg (145 lb)   10/03/17 65.8 kg (145 lb)   10/03/17 65.8 kg (145 lb)   10/02/17 63.5 kg (140 lb)     ECOG performance status: 0  GENERAL APPEARANCE: Healthy, alert and in no acute distress.  HEENT: Sclerae anicteric. PERRLA. Oropharynx without ulcers, lesions, or thrush.  NECK: Supple. No asymmetry or masses.  LYMPHATICS: No palpable cervical, supraclavicular, axillary, or inguinal lymphadenopathy.  RESP: Lungs clear to auscultation bilaterally without rales, rhonchi or wheezes.  CARDIOVASCULAR: Regular rate and rhythm. Normal S1, S2; no S3 or S4. No murmur, gallop, or rub.  ABDOMEN: Soft, nontender. Bowel sounds normal. No palpable organomegaly or masses.  MUSCULOSKELETAL: Extremities without gross deformities noted. No edema of bilateral lower extremities.  SKIN: No suspicious lesions or rashes.  NEURO: Alert and oriented x 3. Cranial nerves II-XII grossly intact.  PSYCHIATRIC: Mentation and affect appear normal.    Laboratory/Imaging Studies:  No visits with results within 2 Week(s) from this visit.  Latest known visit with results is:    Oncology Visit on 10/24/2017   Component Date " Value Ref Range Status     WBC 10/24/2017 5.6  4.0 - 11.0 10e9/L Final     RBC Count 10/24/2017 3.66* 3.8 - 5.2 10e12/L Final     Hemoglobin 10/24/2017 11.7  11.7 - 15.7 g/dL Final     Hematocrit 10/24/2017 37.3  35.0 - 47.0 % Final     MCV 10/24/2017 102* 78 - 100 fl Final     MCH 10/24/2017 32.0  26.5 - 33.0 pg Final     MCHC 10/24/2017 31.4* 31.5 - 36.5 g/dL Final     RDW 10/24/2017 14.7  10.0 - 15.0 % Final     Platelet Count 10/24/2017 294  150 - 450 10e9/L Final     Sodium 10/24/2017 139  133 - 144 mmol/L Final     Potassium 10/24/2017 3.9  3.4 - 5.3 mmol/L Final     Chloride 10/24/2017 102  94 - 109 mmol/L Final     Carbon Dioxide 10/24/2017 27  20 - 32 mmol/L Final     Anion Gap 10/24/2017 10  3 - 14 mmol/L Final     Glucose 10/24/2017 90  70 - 99 mg/dL Final     Urea Nitrogen 10/24/2017 28  7 - 30 mg/dL Final     Creatinine 10/24/2017 0.97  0.52 - 1.04 mg/dL Final     GFR Estimate 10/24/2017 57* >60 mL/min/1.7m2 Final    Non  GFR Calc     GFR Estimate If Black 10/24/2017 69  >60 mL/min/1.7m2 Final    African American GFR Calc     Calcium 10/24/2017 9.6  8.5 - 10.1 mg/dL Final     Bilirubin Total 10/24/2017 0.3  0.2 - 1.3 mg/dL Final     Albumin 10/24/2017 4.4  3.4 - 5.0 g/dL Final     Protein Total 10/24/2017 7.5  6.8 - 8.8 g/dL Final     Alkaline Phosphatase 10/24/2017 59  40 - 150 U/L Final     ALT 10/24/2017 33  0 - 50 U/L Final     AST 10/24/2017 28  0 - 45 U/L Final        No results found for this or any previous visit (from the past 744 hour(s)).    Assessment and plan:    C50.412,  Z17.1) Malignant neoplasm of upper-outer quadrant of left breast in female, estrogen receptor negative (H)  (primary encounter diagnosis)  Patient concluded anti-HER-2/boubacar therapy with Herceptin. We talked about extended anti-HER-2/boubacar therapy with Neratinib. Neratinib was approved based on the ExteNET trial, a multicentre, randomised, double-blind, placebo-controlled trial of neratinib following adjuvant  trastuzumab treatment. In total, 2840 women with early-stage HER2-positive breast cancer and within two years of completing adjuvant trastuzumab were randomised to receive either neratinib (1420 patients) or placebo (1420 patients) for one year. After two years, disease free survival was 94.2% in patients treated with neratinib compared with 91.9% in those receiving placebo (HR 0.66; 95% CI: 0.49, 0.90, p = 0.008). The most common adverse reactions (>5%) were diarrhoea, nausea, abdominal pain, fatigue, vomiting, rash, stomatitis, decreased appetite, muscle spasms, dyspepsia, AST or ALT increase, nail disorder, dry skin, abdominal distention, weight loss, and urinary tract infection. The most common adverse reaction leading to discontinuation was diarrhoea, observed in 16.8% of neratinib-treated patients. Hepatotoxicity or increases in liver transaminases led to drug discontinuation in 1.7% of neratinib-treated patients. The recommended dose is 240 mg (6 tablets) given orally once daily with food, continuously for one year. Antidiarrheal prophylaxis should be initiated with the first neratinib dose and continued during the first 2 cycles (56 days) of treatment and as needed thereafter. .     (I10) Essential hypertension  Patient's blood pressure has been well controlled. She is in hydrochlorothiazide 25 mg orally daily.     (E78.5) Hyperlipidemia LDL goal <130  Patient currently on Lipitor 20 mg orally daily.     (F43.21) Adjustment disorder with depressed mood  She is currently on Effexor XR 37.5 mg orally daily.     The patient is ready to learn, no apparent learning barriers were identified.  Diagnosis and treatment plans were explained to the patient. The patient expressed understanding of the content. The patient asked appropriate questions. The patient questions were answered to her satisfaction.    Time spent 40 minutes with over 50% of the time in counseling and coordination of care including discussion of  management of HER-2/boubacar positive breast cancer, follow-up and prognosis.    Chart documentation with Dragon Voice recognition Software. Although reviewed after completion, some words and grammatical errors may remain.    Again, thank you for allowing me to participate in the care of your patient.        Sincerely,        Syeda Ferguson MD

## 2018-01-16 NOTE — NURSING NOTE
DISCHARGE PLAN:  Next appointments: See patient instruction section  Departure Mode: Ambulatory  Accompanied by:   30 minutes for nursing discharge (face to face time)     Michaela Dorman is here today for Oncology follow up.  Writing nurse seen patient after Medical Oncology appointment to address questions/concerns/coordinate care. Patient will start Nerlynx.  Patient reviewed patient information given to her during last appointment.  Provided more patient information from [email or web address]. Chemotherapy teaching.  Reviewed  See patient instructions and Oncologist's Progress note for further details. Questions and concerns addressed to patient's satisfaction. Patient verbalized and demonstrated understanding of plan.  Contact information provided and patient is encouraged to call with any that arise in the interim of care.    Juan José Hernandez, RN, BSN, OCN   Oncology Care Coordinator RN  [facility]  [phone]  1/16/2018, 4:05 PM

## 2018-01-16 NOTE — MR AVS SNAPSHOT
After Visit Summary   1/16/2018    Michaela Dorman    MRN: [id number]           Patient Information     Date Of Birth          1950        Visit Information        Provider Department      1/16/2018 1:00 PM Syeda Ferguson MD [facility]        Today's Diagnoses     Malignant neoplasm of upper-outer quadrant of left breast in female, estrogen receptor negative (H)    -  1    Essential hypertension        Adjustment disorder with depressed mood        Hyperlipidemia LDL goal <130          Care Instructions      Please follow up with Dr. Ferguson in 1 month with labs 1 month after start of Nerlynx.  Labs 2 weeks after start of Nerlynx as well.  Juan José in Oncology will coordinate with you when start date known.  Please review patient information on Nerlynx and Loperamide Prophylaxis.      Lab Date/Time:    Follow Up Date/Time:   Please come 15-30 minutes early for labs    If you have any questions or concerns please feel free to call.    If you need to reschedule please call:  Clinic or Lab Appointment - [phone]  Infusion - [phone]  Imaging - [phone]    Juan José Hernandez RN, BSN, OCN   Oncology Care Coordinator RN  [facility]  [phone]              Follow-ups after your visit        Your next 10 appointments already scheduled     Jan 26, 2018  7:45 AM CST   Radiology Injections with Keyon Tamayo MD   [facility] ([place] [facility])    [address] [address]   [phone]              Future tests that were ordered for you today     Open Future Orders        Priority Expected Expires Ordered    CBC with platelets differential Routine 1/16/2018 1/16/2019 1/16/2018    Comprehensive metabolic panel Routine 1/16/2018 1/16/2019 1/16/2018    Ca27.29  breast tumor marker Routine 1/16/2018 1/16/2019 1/16/2018            Who to contact     If you have questions or need  "follow up information about today's clinic visit or your schedule please contact [facility] directly at [phone].  Normal or non-critical lab and imaging results will be communicated to you by Squrlhart, letter or phone within 4 business days after the clinic has received the results. If you do not hear from us within 7 days, please contact the clinic through Squrlhart or phone. If you have a critical or abnormal lab result, we will notify you by phone as soon as possible.  Submit refill requests through Maozhao or call your pharmacy and they will forward the refill request to us. Please allow 3 business days for your refill to be completed.          Additional Information About Your Visit        [employer] Information     Maozhao gives you secure access to your electronic health record. If you see a primary care provider, you can also send messages to your care team and make appointments. If you have questions, please call your primary care clinic.  If you do not have a primary care provider, please call [phone] and they will assist you.        Care EveryWhere ID     This is your Care EveryWhere ID. This could be used by other organizations to access your [place] medical records  [id number]        Your Vitals Were     Pulse Temperature Respirations Height Pulse Oximetry BMI (Body Mass Index)    103 97.3  F (36.3  C) (Temporal) 16 1.676 m (5' 6\") 96% 22.65 kg/m2       Blood Pressure from Last 3 Encounters:   01/16/18 121/66   12/22/17 139/84   12/13/17 149/88    Weight from Last 3 Encounters:   01/16/18 63.6 kg (140 lb 4.8 oz)   01/04/18 64.5 kg (142 lb 4.8 oz)   12/11/17 62.6 kg (138 lb)                 Today's Medication Changes          These changes are accurate as of: 1/16/18  1:33 PM.  If you have any questions, ask your nurse or doctor.               Start taking these medicines.        Dose/Directions    Neratinib Maleate 40 MG Tabs tablet CHEMO   Commonly known as:  NERLYNX   Used for:  " Malignant neoplasm of upper-outer quadrant of left breast in female, estrogen receptor negative (H)   Started by:  Syeda Ferguson MD        Dose:  240 mg   Take 6 tablets (240 mg) by mouth daily   Quantity:  180 tablet   Refills:  3         Stop taking these medicines if you haven't already. Please contact your care team if you have questions.     cefuroxime 250 MG tablet   Commonly known as:  CEFTIN   Stopped by:  Syeda Ferguson MD           ciprofloxacin 250 MG tablet   Commonly known as:  CIPRO   Stopped by:  Syeda Ferguson MD           priLOSEC 20 MG CR capsule   Generic drug:  omeprazole   Stopped by:  Syeda Ferguson MD                Where to get your medicines      Call your pharmacy to confirm that your medication is ready for pickup. It may take up to 24 hours for them to receive the prescription. If the prescription is not ready within 3 business days, please contact your clinic or your provider.     We will let you know when these medications are ready. If you don't hear back within 3 business days, please contact us.     Neratinib Maleate 40 MG Tabs tablet CHEMO                Primary Care Provider Office Phone # Fax #    Phani Tapia PA-C [phone]       [place] [address]        Equal Access to Services     SHIVANI ARTIS AH: Amy whitfieldo Sojose, waaxda luqadaha, qaybta kaalmada adeegyada, wale pena. So [facility] [phone].    ATENCIÓN: Si habla español, tiene a ordoñez disposición servicios gratuitos de asistencia lingüística. Llame al [phone].    We comply with applicable federal civil rights laws and [place] laws. We do not discriminate on the basis of race, color, national origin, age, disability, sex, sexual orientation, or gender identity.            Thank you!     Thank you for choosing [facility]  for your care. Our goal is always to provide you with excellent care.  Hearing back from our patients is one way we can continue to improve our services. Please take a few minutes to complete the written survey that you may receive in the mail after your visit with us. Thank you!             Your Updated Medication List - Protect others around you: Learn how to safely use, store and throw away your medicines at www.disposemymeds.org.          This list is accurate as of: 1/16/18  1:33 PM.  Always use your most recent med list.                   Brand Name Dispense Instructions for use Diagnosis    atorvastatin 20 MG tablet    LIPITOR    90 tablet    Take 1 tablet (20 mg) by mouth daily    Hyperlipidemia LDL goal <130       FLUoxetine 40 MG capsule    PROzac    30 capsule    Take 1 capsule (40 mg) by mouth daily    Adjustment disorder with depressed mood       hydrochlorothiazide 25 MG tablet    HYDRODIURIL    90 tablet    TAKE ONE TABLET BY MOUTH ONCE DAILY    Essential hypertension       LORazepam 0.5 MG tablet    ATIVAN    15 tablet    Take 1 tablet (0.5 mg) by mouth every 4 hours as needed (Anxiety, Nausea/Vomiting or Sleep)    Malignant neoplasm of upper-outer quadrant of left female breast (H), Encounter for antineoplastic chemotherapy       Neratinib Maleate 40 MG Tabs tablet CHEMO    NERLYNX    180 tablet    Take 6 tablets (240 mg) by mouth daily    Malignant neoplasm of upper-outer quadrant of left breast in female, estrogen receptor negative (H)       * oxybutynin 5 MG tablet    DITROPAN    90 tablet    TAKE ONE-HALF TABLET BY MOUTH TWICE A DAY    Dysuria       * oxybutynin 5 MG tablet    DITROPAN    90 tablet    TAKE ONE-HALF TABLET BY MOUTH TWICE A DAY    Dysuria       oxyCODONE IR 5 MG tablet    ROXICODONE    20 tablet    Take 1 tablet (5 mg) by mouth every 6 hours as needed for pain    Chronic pain syndrome, Lumbar radiculopathy       oxyCODONE-acetaminophen 5-325 MG per tablet    PERCOCET    45 tablet    Take 1 tablet by mouth every 4 hours as needed for pain    Post-op  pain, S/P reverse total shoulder arthroplasty, right, S/P bilateral mastectomy, Trochanteric bursitis, unspecified laterality       prochlorperazine 10 MG tablet    COMPAZINE    30 tablet    Take 1 tablet (10 mg) by mouth every 6 hours as needed (Nausea/Vomiting)    Encounter for antineoplastic chemotherapy       TYLENOL EX ST ARTHRITIS PAIN 500 MG tablet   Generic drug:  acetaminophen      Take 500-1,000 mg by mouth every 6 hours as needed for mild pain    Dermatitis due to sun       * venlafaxine 37.5 MG 24 hr capsule    EFFEXOR XR    90 capsule    Take 1 capsule (37.5 mg) by mouth daily With the 75 mg.    Adjustment disorder with depressed mood       * venlafaxine 75 MG 24 hr capsule    EFFEXOR-XR    90 capsule    Take 1 capsule (75 mg) by mouth daily Along with 37.5 mg cap.    Adjustment disorder with depressed mood       * Notice:  This list has 4 medication(s) that are the same as other medications prescribed for you. Read the directions carefully, and ask your doctor or other care provider to review them with you.

## 2018-01-16 NOTE — PROGRESS NOTES
Patient here for port labs after visiting with DR. Ferguson. Positive blood return in port then flushed per protocol. Discharged in stable condition with .

## 2018-01-16 NOTE — ASSESSMENT & PLAN NOTE
Michaela Dorman  initially felt a mass in her left axilla. She then underwent mammogram which showed dense breasts with no suspicious lesions in 4/2016. She was then seen by surgery and underwent excisional LN biopsy that showed metastatic high-grade poorly differentiated carcinoma with a tumor size of 2.5 cm. Immunohistochemistry was done for ER/NC receptors that came back both negative. HER-2/boubacar was amplified by FISH. She then underwent MRI of the breast on 9/26/2016 that showed suspicious abnormality with multicentric left sided breast cancer that involved the left lateral breast form the nipple to the chest wall. A PET scan was obtained on 10/5/2016 that showed a hypermetabolic opacity in the left axilla wihtout other pathological activity. It was then recommended that she undergo neoadjuvant chemotherapy with Cytoxan and Doxorubicin that will be followed by Taxol, Herceptin. Patient initiated treatment on 10/11/2016.

## 2018-01-16 NOTE — NURSING NOTE
"Oncology Rooming Note    January 16, 2018 1:06 PM   Michaela Dorman is a 67 year old female who presents for:    Chief Complaint   Patient presents with     Oncology Clinic Visit     6 week follow up for Malignant neoplasm of upper-outer quadrant of left breast in female, estrogen receptor negative     Care Plan     Possible start of Nerlynx     Initial Vitals: /66 (BP Location: Right arm, Patient Position: Chair, Cuff Size: Adult Regular)  Pulse 103  Temp 97.3  F (36.3  C) (Temporal)  Resp 16  Ht 1.676 m (5' 6\")  Wt 63.6 kg (140 lb 4.8 oz)  SpO2 96%  BMI 22.65 kg/m2 Estimated body mass index is 22.65 kg/(m^2) as calculated from the following:    Height as of this encounter: 1.676 m (5' 6\").    Weight as of this encounter: 63.6 kg (140 lb 4.8 oz). Body surface area is 1.72 meters squared.  No Pain (0) Comment: Data Unavailable   No LMP recorded. Patient is postmenopausal.  Allergies reviewed: Yes  Medications reviewed: Yes    Medications: Medication refills not needed today.  Pharmacy name entered into EPIC:    [facility] [place], [place] - [address]  [facility] [place], [place] - [address]  [facility] [place], [place] - [address] [facility]     Clinical concerns: None. Dr. Ferguson was notified.    Britta Mims MA              "

## 2018-01-16 NOTE — TELEPHONE ENCOUNTER
[facility] Prior Authorization Team   Phone: [phone]  Fax: [phone]    PA Initiation    Medication: Neratinib Maleate (NERLYNX) 40 MG TABS tablet CHEMO   Insurance Company: [employer] - Phone [phone] Fax [phone]  Pharmacy Filling the Rx: [facility] - [place] [address]   Filling Pharmacy Phone: [phone]  Filling Pharmacy Fax: [phone]  Start Date: 1/16/2018    Prior Authorization Approval    Authorization Effective Date: 1/1/2018  Authorization Expiration Date: 1/15/2021  Medication: Neratinib Maleate (NERLYNX) 40 MG TABS tablet CHEMO   Approved Dose/Quantity: UD  Reference #: NA   Insurance Company: [employer] - Phone [phone] Fax [phone]  Expected CoPay: NA     CoPay Card Available: No   Foundation Assistance Needed: NA  Which Pharmacy is filling the prescription (Not needed for infusion/clinic administered): [facility] DOES NOT HAVE ACCESS TO THIS LIMITED DISTRIBUTION DRUG.     THE PHARMACIES THAT HAVE ACCESS TO DISPENSE THIS DRUG ARE LISTED BELOW:    [employer]     T: [phone]  F: [phone]  [email or web address]  Hours of operation: M-F, 8 am-10 pm (ET)    [employer]    T: [phone]  F: [phone]  [email or web address]  Hours of operation: M-F, 8 am-11 pm (ET)    [employer]    T: [phone]  F: [phone]  [email or web address]  Hours of operation: M-F, 8 am-8 pm (ET)    [facility]    T: [phone]  F: [phone]  [email or web address]  Hours of operation: M-F, 8:30 am-8:30 pm (ET)    [facility]    T: [phone]  F: [phone]  [email or web address]  Hours of operation: M-F, 9 am-9 pm (ET); Sat., 9 am-5 pm (ET)    [email or web address]     T: [phone]  F: [phone]  [email or web address]  Hours of operation: M-F, 8 am-7 pm (ET); Sat., 9 am-2 pm (ET)

## 2018-01-16 NOTE — MR AVS SNAPSHOT
After Visit Summary   1/16/2018    Michaela Dorman    MRN: [id number]           Patient Information     Date Of Birth          1950        Visit Information        Provider Department      1/16/2018 1:30 PM NL INFUSION CHAIR 3 [facility]        Today's Diagnoses     Encounter for antineoplastic chemotherapy    -  1    Malignant neoplasm of upper-outer quadrant of left breast in female, estrogen receptor negative (H)          Care Instructions    Patient will come for port labs after starting new med ordered by DR. Ferguson.          Follow-ups after your visit        Your next 10 appointments already scheduled     Jan 26, 2018  7:45 AM CST   Radiology Injections with Keyon Tamayo MD   [facility] ([place] [facility])    [address]  [address]  [facility] [address] [phone]              Who to contact     If you have questions or need follow up information about today's clinic visit or your schedule please contact [facility] directly at [phone].  Normal or non-critical lab and imaging results will be communicated to you by A vida Ã© feita [employer] Descontohart, letter or phone within 4 business days after the clinic has received the results. If you do not hear from us within 7 days, please contact the clinic through Hoppert or phone. If you have a critical or abnormal lab result, we will notify you by phone as soon as possible.  Submit refill requests through [employer] or call your pharmacy and they will forward the refill request to us. Please allow 3 business days for your refill to be completed.          Additional Information About Your Visit        A vida Ã© feita de Descontohart Information     [employer] gives you secure access to your electronic health record. If you see a primary care provider, you can also send messages to your care team and make appointments. If you have questions, please call your primary care  clinic.  If you do not have a primary care provider, please call [phone] and they will assist you.        Care EveryWhere ID     This is your Care EveryWhere ID. This could be used by other organizations to access your [place] medical records  [id number]         Blood Pressure from Last 3 Encounters:   01/16/18 121/66   12/22/17 139/84   12/13/17 149/88    Weight from Last 3 Encounters:   01/16/18 63.6 kg (140 lb 4.8 oz)   01/04/18 64.5 kg (142 lb 4.8 oz)   12/11/17 62.6 kg (138 lb)              We Performed the Following     Ca27.29  breast tumor marker     CBC with platelets differential     Comprehensive metabolic panel          Today's Medication Changes          These changes are accurate as of: 1/16/18  2:26 PM.  If you have any questions, ask your nurse or doctor.               Start taking these medicines.        Dose/Directions    Neratinib Maleate 40 MG Tabs tablet CHEMO   Commonly known as:  NERLYNX   Used for:  Malignant neoplasm of upper-outer quadrant of left breast in female, estrogen receptor negative (H)   Started by:  Syeda Ferguson MD        Dose:  240 mg   Take 6 tablets (240 mg) by mouth daily   Quantity:  180 tablet   Refills:  3         Stop taking these medicines if you haven't already. Please contact your care team if you have questions.     cefuroxime 250 MG tablet   Commonly known as:  CEFTIN   Stopped by:  Syeda Ferguson MD           ciprofloxacin 250 MG tablet   Commonly known as:  CIPRO   Stopped by:  Syeda Ferguson MD           priLOSEC 20 MG CR capsule   Generic drug:  omeprazole   Stopped by:  Syeda Ferguson MD                Where to get your medicines      Call your pharmacy to confirm that your medication is ready for pickup. It may take up to 24 hours for them to receive the prescription. If the prescription is not ready within 3 business days, please contact your clinic or your provider.     We will let you know when these medications are ready. If  you don't hear back within 3 business days, please contact us.     Neratinib Maleate 40 MG Tabs tablet CHEMO                Primary Care Provider Office Phone # Fax #    Phani Tapia PA-C [phone]       [place] [address]        Equal Access to Services     JOSÉ MANUELDYAAMI STEFF : Hadii aad ku hadasho Soomaali, waaxda luqadaha, qaybta kaalmada adeegyada, waxay idiin hayaan adeeg khadairsh lazhanna . So [facility] [phone].    ATENCIÓN: Si habla español, tiene a ordoñez disposición servicios gratuitos de asistencia lingüística. Shaneka al [phone].    We comply with applicable federal civil rights laws and [place] laws. We do not discriminate on the basis of race, color, national origin, age, disability, sex, sexual orientation, or gender identity.            Thank you!     Thank you for choosing [facility]  for your care. Our goal is always to provide you with excellent care. Hearing back from our patients is one way we can continue to improve our services. Please take a few minutes to complete the written survey that you may receive in the mail after your visit with us. Thank you!             Your Updated Medication List - Protect others around you: Learn how to safely use, store and throw away your medicines at www.disposemymeds.org.          This list is accurate as of: 1/16/18  2:26 PM.  Always use your most recent med list.                   Brand Name Dispense Instructions for use Diagnosis    atorvastatin 20 MG tablet    LIPITOR    90 tablet    Take 1 tablet (20 mg) by mouth daily    Hyperlipidemia LDL goal <130       FLUoxetine 40 MG capsule    PROzac    30 capsule    Take 1 capsule (40 mg) by mouth daily    Adjustment disorder with depressed mood       hydrochlorothiazide 25 MG tablet    HYDRODIURIL    90 tablet    TAKE ONE TABLET BY MOUTH ONCE DAILY    Essential hypertension       LORazepam 0.5 MG tablet    ATIVAN    15 tablet    Take 1  tablet (0.5 mg) by mouth every 4 hours as needed (Anxiety, Nausea/Vomiting or Sleep)    Malignant neoplasm of upper-outer quadrant of left female breast (H), Encounter for antineoplastic chemotherapy       Neratinib Maleate 40 MG Tabs tablet CHEMO    NERLYNX    180 tablet    Take 6 tablets (240 mg) by mouth daily    Malignant neoplasm of upper-outer quadrant of left breast in female, estrogen receptor negative (H)       * oxybutynin 5 MG tablet    DITROPAN    90 tablet    TAKE ONE-HALF TABLET BY MOUTH TWICE A DAY    Dysuria       * oxybutynin 5 MG tablet    DITROPAN    90 tablet    TAKE ONE-HALF TABLET BY MOUTH TWICE A DAY    Dysuria       oxyCODONE IR 5 MG tablet    ROXICODONE    20 tablet    Take 1 tablet (5 mg) by mouth every 6 hours as needed for pain    Chronic pain syndrome, Lumbar radiculopathy       oxyCODONE-acetaminophen 5-325 MG per tablet    PERCOCET    45 tablet    Take 1 tablet by mouth every 4 hours as needed for pain    Post-op pain, S/P reverse total shoulder arthroplasty, right, S/P bilateral mastectomy, Trochanteric bursitis, unspecified laterality       prochlorperazine 10 MG tablet    COMPAZINE    30 tablet    Take 1 tablet (10 mg) by mouth every 6 hours as needed (Nausea/Vomiting)    Encounter for antineoplastic chemotherapy       TYLENOL EX ST ARTHRITIS PAIN 500 MG tablet   Generic drug:  acetaminophen      Take 500-1,000 mg by mouth every 6 hours as needed for mild pain    Dermatitis due to sun       * venlafaxine 37.5 MG 24 hr capsule    EFFEXOR XR    90 capsule    Take 1 capsule (37.5 mg) by mouth daily With the 75 mg.    Adjustment disorder with depressed mood       * venlafaxine 75 MG 24 hr capsule    EFFEXOR-XR    90 capsule    Take 1 capsule (75 mg) by mouth daily Along with 37.5 mg cap.    Adjustment disorder with depressed mood       * Notice:  This list has 4 medication(s) that are the same as other medications prescribed for you. Read the directions carefully, and ask your doctor or  other care provider to review them with you.

## 2018-01-16 NOTE — PATIENT INSTRUCTIONS
Please follow up with Dr. Ferguson in 1 month with labs 1 month after start of Nerlynx.  Labs 2 weeks after start of Nerlynx as well.  Juan José in Oncology will coordinate with you when start date known.  Please review patient information on Nerlynx and Loperamide Prophylaxis.      Lab Date/Time:    Follow Up Date/Time:   Please come 15-30 minutes early for labs    If you have any questions or concerns please feel free to call.    If you need to reschedule please call:  Clinic or Lab Appointment - [phone]  Infusion - [phone]  Imaging - [phone]    Juan José Hernandez, RN, BSN, OCN   Oncology Care Coordinator RN  [facility]  [phone]

## 2018-01-18 ENCOUNTER — TELEPHONE (OUTPATIENT)
Dept: FAMILY MEDICINE | Facility: OTHER | Age: 68
End: 2018-01-18

## 2018-01-18 DIAGNOSIS — B02.9 HERPES ZOSTER WITHOUT COMPLICATION: Primary | ICD-10-CM

## 2018-01-18 RX ORDER — VALACYCLOVIR HYDROCHLORIDE 1 G/1
1000 TABLET, FILM COATED ORAL 3 TIMES DAILY
Qty: 21 TABLET | Refills: 0 | Status: SHIPPED | OUTPATIENT
Start: 2018-01-18 | End: 2018-02-16

## 2018-01-18 NOTE — TELEPHONE ENCOUNTER
Called and spoke to the patient. She said sometime last week she felt a weird pain sensation on her left inner thigh. Patient said on Tuesday she noticed a raised rash starting on the left thigh. She said the rash is blistery. She said this morning she work up to a few more blisters. She said they are in a cluster and in a 4 inch [place]. Patient has no other symptoms. She said she still does have some pain in the left thigh still.     Patient is thinking this is shingles. She would like advise from Phani. She is wondering if she should give it a few more days and watch it? Get started on something? Or come in and be seen?    Will route to provider to advise.     Dotty Barriga RN  [facility]

## 2018-01-18 NOTE — TELEPHONE ENCOUNTER
Certainly sounds like shingles without being able to see it.  Would treat with Valtrex.. Rx to her pharmacy has been sent.  Electronically signed:    Phani Jason PA-C

## 2018-01-18 NOTE — TELEPHONE ENCOUNTER
Called and spoke to the patient. Informed her of the message below. She understands and agrees with the plan of care. She will follow up if things are not improving.     Dotty Barriga RN  [facility]

## 2018-01-18 NOTE — TELEPHONE ENCOUNTER
Reason for call:  Patient reporting a symptom    Symptom or request: skin hurt in her thigh, now it's blistering    Duration (how long have symptoms been present): about a week    Have you been treated for this before? No    Additional comments: Please call her back and advise on her symptoms. She thinks she is getting shingles.     Phone Number patient can be reached at:  Home number on file [phone] (home)    Best Time:  anytime    Can we leave a detailed message on this number:  YES    Call taken on 1/18/2018 at 9:37 AM by Nesha Martinez

## 2018-01-27 DIAGNOSIS — Z51.11 ENCOUNTER FOR ANTINEOPLASTIC CHEMOTHERAPY: ICD-10-CM

## 2018-01-29 RX ORDER — PROCHLORPERAZINE MALEATE 10 MG
10 TABLET ORAL EVERY 6 HOURS PRN
Qty: 30 TABLET | Refills: 3 | Status: SHIPPED | OUTPATIENT
Start: 2018-01-29 | End: 2018-08-15

## 2018-01-29 NOTE — TELEPHONE ENCOUNTER
Requested Prescriptions   Pending Prescriptions Disp Refills     prochlorperazine (COMPAZINE) 10 MG tablet 30 tablet 3     Sig: Take 1 tablet (10 mg) by mouth every 6 hours as needed (Nausea/Vomiting)    There is no refill protocol information for this order        Last Written Prescription Date:  8/17/2017  Last Fill Quantity: 30,  # refills: 3   Last Office Visit with LIZETH provider:  11-   Future Office Visit:

## 2018-01-29 NOTE — TELEPHONE ENCOUNTER
Prescription approved per [facility] Refill Protocol.  Larry Meadows, RN, BSN

## 2018-02-01 ENCOUNTER — TELEPHONE (OUTPATIENT)
Dept: ONCOLOGY | Facility: CLINIC | Age: 68
End: 2018-02-01

## 2018-02-01 NOTE — TELEPHONE ENCOUNTER
Reason for Call:  Other call back    Detailed comments: Alex states Michaela does not have coverage for her VALTREX  And asked to speak with Juan José.  Please call    Phone Number Patient can be reached at: Other phone number:  [phone] Alex*    Best Time: 9-5    Can we leave a detailed message on this number? NO    Call taken on 2/1/2018 at 1:09 PM by Mercedes Thomason

## 2018-02-01 NOTE — TELEPHONE ENCOUNTER
Returned call and it is about Nerlynx and patient will need to try to get assistance from 3 other foundations before they will push her application through from [facility].  Will relay information to patient and have her call.  Patient Assistance [facility] - [phone]  [facility] - [phone]  [facility] - [phone]    Juan José Hernandez RN, BSN, OCN  2/1/2018, 1:45 PM

## 2018-02-02 ENCOUNTER — OFFICE VISIT (OUTPATIENT)
Dept: SURGERY | Facility: CLINIC | Age: 68
End: 2018-02-02
Payer: COMMERCIAL

## 2018-02-02 ENCOUNTER — TELEPHONE (OUTPATIENT)
Dept: SURGERY | Facility: CLINIC | Age: 68
End: 2018-02-02

## 2018-02-02 VITALS — WEIGHT: 137.5 LBS | BODY MASS INDEX: 22.19 KG/M2 | OXYGEN SATURATION: 97 % | HEART RATE: 107 BPM | TEMPERATURE: 97.4 F

## 2018-02-02 DIAGNOSIS — C50.812 MALIGNANT NEOPLASM OF OVERLAPPING SITES OF LEFT FEMALE BREAST, UNSPECIFIED ESTROGEN RECEPTOR STATUS (H): ICD-10-CM

## 2018-02-02 DIAGNOSIS — Z45.2 ENCOUNTER FOR VENOUS ACCESS DEVICE CARE: Primary | ICD-10-CM

## 2018-02-02 PROCEDURE — 99213 OFFICE O/P EST LOW 20 MIN: CPT | Performed by: SPECIALIST

## 2018-02-02 NOTE — NURSING NOTE
"Chief Complaint   Patient presents with     Surgical Followup     discuss port removal -port placed 9-        Initial Pulse 107  Temp 97.4  F (36.3  C) (Temporal)  Wt 62.4 kg (137 lb 8 oz)  SpO2 97%  BMI 22.19 kg/m2 Estimated body mass index is 22.19 kg/(m^2) as calculated from the following:    Height as of 1/16/18: 1.676 m (5' 6\").    Weight as of this encounter: 62.4 kg (137 lb 8 oz).  Medication Reconciliation: complete    "

## 2018-02-02 NOTE — NURSING NOTE
[facility]    Michaela Dorman has been given the following teaching information:  Before Your Surgery booklet  Instructions for Showering or Bathing before Surgery  Request for surgery sheet given to Harini manning Herreid

## 2018-02-02 NOTE — TELEPHONE ENCOUNTER
Surgery Scheduled    Date of Surgery 2/14   Procedure:  port removal  [facility]Surgical Facility: [place]  Surgeon: Dr. Blanco  Type of Anesthesia : MAC  Pre-op 2/6 with Phani Hess  2 week post op:2/22 with Dr. Blanco        Surgery packet given to patient in clinic. Patients instructed to arrive 1 hours prior to surgery. Patient understood and agrees to plan.    Harini Kim  Surgical Scheduler

## 2018-02-02 NOTE — LETTER
2/2/2018         RE: Michaela Dorman  [address]  [place] [address]        Dear Colleague,    Thank you for referring your patient, Michaela Dorman, to the [facility]. Please see a copy of my visit note below.    F/U for right IJ Mediport      Subjective:  Pt is well know to me for a left breast cancer and right IJ port placement.  She has completed her chemo and would like her port removed.    Objective:  B/P: Data Unavailable, T: 97.4, P: 107, R: Data Unavailable  Chest - Right IJ port in place.      Lab Results   Component Value Date    WBC 5.5 01/16/2018     Lab Results   Component Value Date    RBC 3.67 01/16/2018     Lab Results   Component Value Date    HGB 11.8 01/16/2018     Lab Results   Component Value Date    HCT 36.8 01/16/2018     No components found for: MCT  Lab Results   Component Value Date     01/16/2018     Lab Results   Component Value Date    MCH 32.2 01/16/2018     Lab Results   Component Value Date    MCHC 32.1 01/16/2018     Lab Results   Component Value Date    RDW 14.9 01/16/2018     Lab Results   Component Value Date     01/16/2018         Assessment/Plan:  Pt is s/p right IJ port placement for chemo.  Now presents for port removal as she has completed chemo.    Would like port removed under MAC.  The procedure, risks, benefits and alternatives were discussed and she agrees to proceed.  She will be scheduled in the near future.    Keyon Blanco MD, FACS          Again, thank you for allowing me to participate in the care of your patient.        Sincerely,        Keyon Blanco MD

## 2018-02-02 NOTE — MR AVS SNAPSHOT
After Visit Summary   2/2/2018    Michaela Dorman    MRN: [id number]           Patient Information     Date Of Birth          1950        Visit Information        Provider Department      2/2/2018 10:00 AM Keyon Blanco MD [facility]        Today's Diagnoses     Encounter for venous access device care    -  1    Malignant neoplasm of overlapping sites of left female breast, unspecified estrogen receptor status (H)           Follow-ups after your visit        Your next 10 appointments already scheduled     Feb 06, 2018  3:00 PM CST   [facility]    [address] [facility] [address]   [phone]            Feb 08, 2018  7:45 AM CST   Radiology Injections with Keyon Tamaoy MD   [facility] ([facility])    [address]  [address]  [place] [address]   [phone]            Feb 22, 2018 10:30 AM CST   Return Visit with Keyon Blanco MD   [facility]    [address] [id number] [phone]              Who to contact     If you have questions or need follow up information about today's clinic visit or your schedule please contact [facility] directly at [phone].  Normal or non-critical lab and imaging results will be communicated to you by MyChart, letter or phone within 4 business days after the clinic has received the results. If you do not hear from us within 7 days, please contact the clinic through MyChart or phone. If you have a critical or abnormal lab result, we will notify you by phone as soon as possible.  Submit refill requests through [employer] or call your pharmacy and they will forward the refill request to us. Please allow 3 business days for your refill to be completed.          Additional Information  About Your Visit        [employer] Information     [employer] gives you secure access to your electronic health record. If you see a primary care provider, you can also send messages to your care team and make appointments. If you have questions, please call your primary care clinic.  If you do not have a primary care provider, please call [phone] and they will assist you.        Care EveryWhere ID     This is your Care EveryWhere ID. This could be used by other organizations to access your [facility] medical records  [id number]        Your Vitals Were     Pulse Temperature Pulse Oximetry BMI (Body Mass Index)          107 97.4  F (36.3  C) (Temporal) 97% 22.19 kg/m2         Blood Pressure from Last 3 Encounters:   01/16/18 121/66   12/22/17 139/84   12/13/17 149/88    Weight from Last 3 Encounters:   02/02/18 62.4 kg (137 lb 8 oz)   01/16/18 63.6 kg (140 lb 4.8 oz)   01/04/18 64.5 kg (142 lb 4.8 oz)              Today, you had the following     No orders found for display       Primary Care Provider Office Phone # Fax #    Phani Tapia PA-C [phone]       [place] [address]        Equal Access to Services     SHIVANI ARTIS AH: Hadii aad ku hadasho Soomaali, waaxda luqadaha, qaybta kaalmada adeegyada, waxay idiin hayholan al pena. So [facility] [phone].    ATENCIÓN: Si habla español, tiene a ordoñez disposición servicios gratuitos de asistencia lingüística. Llame al [phone].    We comply with applicable federal civil rights laws and [place] laws. We do not discriminate on the basis of race, color, national origin, age, disability, sex, sexual orientation, or gender identity.            Thank you!     Thank you for choosing [facility]  for your care. Our goal is always to provide you with excellent care. Hearing back from our patients is one way we can continue to improve our services. Please take a few minutes to complete the written  survey that you may receive in the mail after your visit with us. Thank you!             Your Updated Medication List - Protect others around you: Learn how to safely use, store and throw away your medicines at www.disposemymeds.org.          This list is accurate as of 2/2/18 10:15 AM.  Always use your most recent med list.                   Brand Name Dispense Instructions for use Diagnosis    atorvastatin 20 MG tablet    LIPITOR    90 tablet    Take 1 tablet (20 mg) by mouth daily    Hyperlipidemia LDL goal <130       FLUoxetine 40 MG capsule    PROzac    30 capsule    Take 1 capsule (40 mg) by mouth daily    Adjustment disorder with depressed mood       hydrochlorothiazide 25 MG tablet    HYDRODIURIL    90 tablet    TAKE ONE TABLET BY MOUTH ONCE DAILY    Essential hypertension       LORazepam 0.5 MG tablet    ATIVAN    15 tablet    Take 1 tablet (0.5 mg) by mouth every 4 hours as needed (Anxiety, Nausea/Vomiting or Sleep)    Malignant neoplasm of upper-outer quadrant of left female breast (H), Encounter for antineoplastic chemotherapy       Neratinib Maleate 40 MG Tabs tablet CHEMO    NERLYNX    180 tablet    Take 6 tablets (240 mg) by mouth daily    Malignant neoplasm of upper-outer quadrant of left breast in female, estrogen receptor negative (H)       * oxybutynin 5 MG tablet    DITROPAN    90 tablet    TAKE ONE-HALF TABLET BY MOUTH TWICE A DAY    Dysuria       * oxybutynin 5 MG tablet    DITROPAN    90 tablet    TAKE ONE-HALF TABLET BY MOUTH TWICE A DAY    Dysuria       oxyCODONE IR 5 MG tablet    ROXICODONE    20 tablet    Take 1 tablet (5 mg) by mouth every 6 hours as needed for pain    Chronic pain syndrome, Lumbar radiculopathy       oxyCODONE-acetaminophen 5-325 MG per tablet    PERCOCET    45 tablet    Take 1 tablet by mouth every 4 hours as needed for pain    Post-op pain, S/P reverse total shoulder arthroplasty, right, S/P bilateral mastectomy, Trochanteric bursitis, unspecified laterality        prochlorperazine 10 MG tablet    COMPAZINE    30 tablet    Take 1 tablet (10 mg) by mouth every 6 hours as needed (Nausea/Vomiting)    Encounter for antineoplastic chemotherapy       TYLENOL EX ST ARTHRITIS PAIN 500 MG tablet   Generic drug:  acetaminophen      Take 500-1,000 mg by mouth every 6 hours as needed for mild pain    Dermatitis due to sun       valACYclovir 1000 mg tablet    VALTREX    21 tablet    Take 1 tablet (1,000 mg) by mouth 3 times daily    Herpes zoster without complication       * venlafaxine 37.5 MG 24 hr capsule    EFFEXOR XR    90 capsule    Take 1 capsule (37.5 mg) by mouth daily With the 75 mg.    Adjustment disorder with depressed mood       * venlafaxine 75 MG 24 hr capsule    EFFEXOR-XR    90 capsule    Take 1 capsule (75 mg) by mouth daily Along with 37.5 mg cap.    Adjustment disorder with depressed mood       * Notice:  This list has 4 medication(s) that are the same as other medications prescribed for you. Read the directions carefully, and ask your doctor or other care provider to review them with you.

## 2018-02-02 NOTE — PROGRESS NOTES
[facility]  [address] [facility] [address]  [phone]  Dept: [phone]    PRE-OP EVALUATION:  Today's date: 2018    Michaela Dorman (: 1950) presents for pre-operative evaluation assessment as requested by Dr. Blanco.  She requires evaluation and anesthesia risk assessment prior to undergoing surgery/procedure for treatment of port access removal .  Proposed procedure: right intra jugular port removal    Date of Surgery/ Procedure: 18  Time of Surgery/ Procedure: 730  Hospital/Surgical Facility: [facility]  Fax number for surgical facility:   Primary Physician: Phani Tapia  Type of Anesthesia Anticipated: Local with MAC    Patient has a Health Care Directive or Living Will:      Preop Questions 2/3/2018   1.  Do you have a history of heart attack, stroke, stent, bypass or surgery on an artery in the head, neck, heart or legs? No   2.  Do you ever have any pain or discomfort in your chest? No   3.  Do you have a history of  Heart Failure? No   4.   Are you troubled by shortness of breath when:  walking on a level surface, or up a slight hill, or at night? No   5.  Do you currently have a cold, bronchitis or other respiratory infection? No   6.  Do you have a cough, shortness of breath, or wheezing? No   7.  Do you sometimes get pains in the calves of your legs when you walk? No   8. Do you or anyone in your family have previous history of blood clots? No   9.  Do you or does anyone in your family have a serious bleeding problem such as prolonged bleeding following surgeries or cuts? No   10. Have you ever had problems with anemia or been told to take iron pills? No   11. Have you had any abnormal blood loss such as black, tarry or bloody stools, or abnormal vaginal bleeding? No   12. Have you ever had a blood transfusion? No   13. Have you or any of your relatives ever had problems with anesthesia? No   14. Do you have sleep apnea, excessive snoring or  daytime drowsiness? No   15. Do you have any prosthetic heart valves? No   16. Do you have prosthetic joints? YES -    17. Is there any chance that you may be pregnant? No         HPI:                                                      Brief HPI related to upcoming procedure: has had a vascular access for chemo in need of removal      See problem list for active medical problems.  Problems all longstanding and stable, except as noted/documented.  See ROS for pertinent symptoms related to these conditions.                                                                                                  .    MEDICAL HISTORY:                                                      Patient Active Problem List    Diagnosis Date Noted     Foraminal stenosis of lumbar region 12/01/2017     Priority: Medium     Complete lumbar spine left at various degrees and to a lesser extent the right as well.       Central stenosis of spinal canal 12/01/2017     Priority: Medium     lumbar         DDD (degenerative disc disease), lumbar 12/01/2017     Priority: Medium     Chronic pain syndrome 11/30/2017     Priority: Medium     substancePatient is followed by Phani Jason PA-C for ongoing prescription of pain medication.  All refills should only be approved by this provider, or covering partner.    Medication(s): Oxycodone IR 5mg.   Maximum quantity per month: 20  Clinic visit frequency required: Q 3 months     Controlled substance agreement:  Encounter-Level CSA:     There are no encounter-level csa.        Pain Clinic evaluation in the past: Yes       Date/Location:      DIRE Total Score(s):  No flowsheet data found.    Last [facility] website verification:  none   [email or web address]             Lumbar radiculopathy 11/30/2017     Priority: Medium     S/P reverse total shoulder arthroplasty, right 06/05/2017     Priority: Medium     Prophylactic antibiotic for dental procedure indicated due to prior joint replacement 06/05/2017      Priority: Medium     Hyperlipidemia LDL goal <130 05/30/2017     Priority: Medium     Anxiety 03/29/2017     Priority: Medium     S/P bilateral mastectomy 02/08/2017     Priority: Medium     Encounter for antineoplastic chemotherapy 10/07/2016     Priority: Medium     Malignant neoplasm of upper-outer quadrant of left female breast (H) 10/06/2016     Priority: Medium     Adjustment disorder with depressed mood 11/11/2015     Priority: Medium     Essential hypertension 11/11/2015     Priority: Medium     Baker's cyst of knee 02/09/2015     Priority: Medium     Patella-femoral syndrome 02/09/2015     Priority: Medium     Health Care Home 09/29/2011     Priority: Medium     x  DX V65.8 REPLACED WITH [id number]ALTH CARE HOME (04/08/2013)       Advanced directives, counseling/discussion 08/22/2011     Priority: Medium     Advance Directive Problem List Overview:   Name Relationship Phone    Primary Health Care Agent            Alternative Health Care Agent          Patient states has Advance Directive and will bring in a copy to clinic. 8/22/2011          Trochanteric bursitis 01/06/2009     Priority: Medium     Family history of stroke (cerebrovascular) 03/07/2008     Priority: Medium     Enthesopathy of hip region 01/30/2006     Priority: Medium      Past Medical History:   Diagnosis Date     Central stenosis of spinal canal 12/1/2017    lumbar      DDD (degenerative disc disease), lumbar 12/1/2017     Depressive disorder, not elsewhere classified      Esophageal reflux      ETOH abuse     in remission     Foraminal stenosis of lumbar region 12/1/2017    Complete lumbar spine left at various degrees and to a lesser extent the right as well.     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     Subdural hematoma (H)      Past Surgical History:   Procedure Laterality Date      ARTHROPLASTY SHOULDER Right 11/17/2015     ARTHROSCOPY KNEE  6/7/2012    Procedure:ARTHROSCOPY KNEE; right knee arthroscopy with partial lateral menisectomy; Surgeon:DAMIÁN MCALLISTER; Location:[facility] OR     C LIGATE FALLOPIAN TUBE       C NONSPECIFIC PROCEDURE  1956    ankle fracture surgery     COLONOSCOPY N/A 12/22/2017    Procedure: COLONOSCOPY;  colonoscopy;  Surgeon: Chuck Chand MD;  Location: [facility] GI     EXCISE MASS AXILLA Left 9/16/2016    Procedure: EXCISE MASS AXILLA;  Surgeon: Keyon Blanco MD;  Location: [facility] OR     HC REMV CATARACT EXTRACAP,INSERT LENS  6/25/2009    Right eye     INSERT PORT VASCULAR ACCESS Right 10/7/2016    Procedure: INSERT PORT VASCULAR ACCESS;  Surgeon: Keyon Blanco MD;  Location: [facility] OR     MASTECTOMY SIMPLE BILATERAL Bilateral 2/8/2017    Procedure: MASTECTOMY SIMPLE BILATERAL;  Surgeon: Keyon Blanco MD;  Location: [facility] OR     OPEN REDUCTION INTERNAL FIXATION FOOT Right 3/20/2015    Procedure: OPEN REDUCTION INTERNAL FIXATION FOOT;  Surgeon: Javi Herrmann DPM;  Location: [facility] OR     SHOULDER SURGERY Right 11/2015     Current Outpatient Prescriptions   Medication Sig Dispense Refill     prochlorperazine (COMPAZINE) 10 MG tablet Take 1 tablet (10 mg) by mouth every 6 hours as needed (Nausea/Vomiting) 30 tablet 3     valACYclovir (VALTREX) 1000 mg tablet Take 1 tablet (1,000 mg) by mouth 3 times daily 21 tablet 0     Neratinib Maleate (NERLYNX) 40 MG TABS tablet CHEMO Take 6 tablets (240 mg) by mouth daily 180 tablet 3     FLUoxetine (PROZAC) 40 MG capsule Take 1 capsule (40 mg) by mouth daily 30 capsule 1     venlafaxine (EFFEXOR XR) 37.5 MG 24 hr capsule Take 1 capsule (37.5 mg) by mouth daily With the 75 mg. 90 capsule 0     venlafaxine (EFFEXOR-XR) 75 MG 24 hr capsule Take 1 capsule (75 mg) by mouth daily Along with 37.5 mg cap. 90 capsule 0     oxyCODONE-acetaminophen (PERCOCET) 5-325 MG per tablet Take 1 tablet by mouth every 4 hours as needed for pain 45 tablet 0      hydrochlorothiazide (HYDRODIURIL) 25 MG tablet TAKE ONE TABLET BY MOUTH ONCE DAILY 90 tablet 1     oxyCODONE IR (ROXICODONE) 5 MG tablet Take 1 tablet (5 mg) by mouth every 6 hours as needed for pain 20 tablet 0     oxybutynin (DITROPAN) 5 MG tablet TAKE ONE-HALF TABLET BY MOUTH TWICE A DAY 90 tablet 1     atorvastatin (LIPITOR) 20 MG tablet Take 1 tablet (20 mg) by mouth daily 90 tablet 1     oxybutynin (DITROPAN) 5 MG tablet TAKE ONE-HALF TABLET BY MOUTH TWICE A DAY 90 tablet 0     LORazepam (ATIVAN) 0.5 MG tablet Take 1 tablet (0.5 mg) by mouth every 4 hours as needed (Anxiety, Nausea/Vomiting or Sleep) 15 tablet 0     acetaminophen (TYLENOL EX ST ARTHRITIS PAIN) 500 MG tablet Take 500-1,000 mg by mouth every 6 hours as needed for mild pain       OTC products: None, except as noted above    Allergies   Allergen Reactions     No Known Drug Allergies       Latex Allergy: NO    Social History   Substance Use Topics     Smoking status: Former Smoker     Packs/day: 3.00     Years: 10.00     Types: Cigarettes     Quit date: 12/1/1979     Smokeless tobacco: Never Used      Comment: approx. 3 packs daily     Alcohol use 0.0 oz/week     0 Standard drinks or equivalent per week      Comment: daily glass of wine     History   Drug Use No       REVIEW OF SYSTEMS:                                                    C: NEGATIVE for fever, chills, change in weight  I: NEGATIVE for worrisome rashes, moles or lesions  E: NEGATIVE for vision changes or irritation  E/M: NEGATIVE for ear, mouth and throat problems  R: NEGATIVE for significant cough or SOB  B: NEGATIVE for masses, tenderness or discharge  CV: NEGATIVE for chest pain, palpitations or peripheral edema  GI: NEGATIVE for nausea, abdominal pain, heartburn, or change in bowel habits  : NEGATIVE for frequency, dysuria, or hematuria  M: NEGATIVE for significant arthralgias or myalgia  N: NEGATIVE for weakness, dizziness or paresthesias  E: NEGATIVE for temperature  "intolerance, skin/hair changes  H: NEGATIVE for bleeding problems  P: NEGATIVE for changes in mood or affect    EXAM:                                                    /66 (Cuff Size: Adult Regular)  Pulse 60  Temp 98.5  F (36.9  C) (Temporal)  Resp 18  Ht 5' 6\" (1.676 m)  Wt 140 lb 4.8 oz (63.6 kg)  BMI 22.65 kg/m2    GENERAL APPEARANCE: healthy, alert and no distress     EYES: EOMI, PERRL     HENT: ear canals and TM's normal and nose and mouth without ulcers or lesions     NECK: no adenopathy, no asymmetry, masses, or scars and thyroid normal to palpation     RESP: lungs clear to auscultation - no rales, rhonchi or wheezes     CV: regular rates and rhythm, normal S1 S2, no S3 or S4 and no murmur, click or rub     ABDOMEN:  soft, nontender, no HSM or masses and bowel sounds normal     MS: extremities normal- no gross deformities noted, no evidence of inflammation in joints, FROM in all extremities.     NEURO: Normal strength and tone, sensory exam grossly normal, mentation intact and speech normal     PSYCH: mentation appears normal. and affect normal/bright    DIAGNOSTICS:                                                      EKG: appears normal, NSR, normal axis, normal intervals, no acute ST/T changes c/w ischemia, no LVH by voltage criteria, unchanged from previous tracings  Labs Drawn and in Process:   Unresulted Labs Ordered in the Past 30 Days of this Admission     No orders found from 12/8/2017 to 2/7/2018.          Recent Labs   Lab Test  01/16/18   1405  10/24/17   1025   01/24/17   1016   HGB  11.8  11.7   < >  9.6*   PLT  229  294   < >  315   NA  139  139   < >  138   POTASSIUM  3.6  3.9   < >  3.9   CR  1.01  0.97   < >  0.92   A1C   --    --    --   5.0    < > = values in this interval not displayed.        IMPRESSION:                                                    Reason for surgery/procedure: vascular access site removal  Diagnosis/reason for consult: anesthesia / surgical " clearance.    The proposed surgical procedure is considered INTERMEDIATE risk.    REVISED CARDIAC RISK INDEX  The patient has the following serious cardiovascular risks for perioperative complications such as (MI, PE, VFib and 3  AV Block):  No serious cardiac risks  INTERPRETATION: 1 risks: Class II (low risk - 0.9% complication rate)    The patient has the following additional risks for perioperative complications:  No identified additional risks  The 10-year ASCVD risk score (Nadiraaldair GIRON Jr, et al., 2013) is: 7.7%    Values used to calculate the score:      Age: 67 years      Sex: Female      Is Non- : No      Diabetic: No      Tobacco smoker: No      Systolic Blood Pressure: 124 mmHg      Is BP treated: No      HDL Cholesterol: 67 mg/dL      Total Cholesterol: 273 mg/dL      ICD-10-CM    1. Preop general physical exam Z01.818        RECOMMENDATIONS:                                                        Pulmonary Risk  Incentive spirometry post op  Respiratory Therapy (Respiratory Care IP Consult)  post op  NG tube decompression if abdominal distension or significant vomiting       --Patient is to take all scheduled medications on the day of surgery EXCEPT for modifications listed below.    APPROVAL GIVEN to proceed with proposed procedure, without further diagnostic evaluation       Signed Electronically by: Phani Jason PA-C    Copy of this evaluation report is provided to requesting physician.    [facility] Preop Guidelines

## 2018-02-02 NOTE — PROGRESS NOTES
F/U for right IJ Mediport      Subjective:  Pt is well know to me for a left breast cancer and right IJ port placement.  She has completed her chemo and would like her port removed.    Objective:  B/P: Data Unavailable, T: 97.4, P: 107, R: Data Unavailable  Chest - Right IJ port in place.      Lab Results   Component Value Date    WBC 5.5 01/16/2018     Lab Results   Component Value Date    RBC 3.67 01/16/2018     Lab Results   Component Value Date    HGB 11.8 01/16/2018     Lab Results   Component Value Date    HCT 36.8 01/16/2018     No components found for: MCT  Lab Results   Component Value Date     01/16/2018     Lab Results   Component Value Date    MCH 32.2 01/16/2018     Lab Results   Component Value Date    MCHC 32.1 01/16/2018     Lab Results   Component Value Date    RDW 14.9 01/16/2018     Lab Results   Component Value Date     01/16/2018         Assessment/Plan:  Pt is s/p right IJ port placement for chemo.  Now presents for port removal as she has completed chemo.    Would like port removed under MAC.  The procedure, risks, benefits and alternatives were discussed and she agrees to proceed.  She will be scheduled in the near future.    Keyon Blanco MD, FACS

## 2018-02-05 NOTE — TELEPHONE ENCOUNTER
Able to reach patient.  Relayed information with contacts.  Patient will start calling today.  Gave her Oncology Fax number so if denied they can fax the denial to Oncology in order to keep the process moving for trying to get copay assistance.    Juan José Hernandez RN, BSN, OCN  2/5/2018, 10:02 AM

## 2018-02-06 ENCOUNTER — OFFICE VISIT (OUTPATIENT)
Dept: FAMILY MEDICINE | Facility: OTHER | Age: 68
End: 2018-02-06
Payer: COMMERCIAL

## 2018-02-06 VITALS
TEMPERATURE: 98.5 F | SYSTOLIC BLOOD PRESSURE: 124 MMHG | BODY MASS INDEX: 22.55 KG/M2 | WEIGHT: 140.3 LBS | HEIGHT: 66 IN | DIASTOLIC BLOOD PRESSURE: 66 MMHG | HEART RATE: 60 BPM | RESPIRATION RATE: 18 BRPM

## 2018-02-06 DIAGNOSIS — G89.4 CHRONIC PAIN SYNDROME: ICD-10-CM

## 2018-02-06 DIAGNOSIS — Z90.13 S/P BILATERAL MASTECTOMY: ICD-10-CM

## 2018-02-06 DIAGNOSIS — M54.16 LUMBAR RADICULOPATHY: ICD-10-CM

## 2018-02-06 DIAGNOSIS — Z82.3 FAMILY HISTORY OF STROKE (CEREBROVASCULAR): ICD-10-CM

## 2018-02-06 DIAGNOSIS — Z01.818 PREOP GENERAL PHYSICAL EXAM: Primary | ICD-10-CM

## 2018-02-06 DIAGNOSIS — N30.00 ACUTE CYSTITIS WITHOUT HEMATURIA: ICD-10-CM

## 2018-02-06 DIAGNOSIS — F41.9 ANXIETY: ICD-10-CM

## 2018-02-06 DIAGNOSIS — C50.412 MALIGNANT NEOPLASM OF UPPER-OUTER QUADRANT OF LEFT FEMALE BREAST, UNSPECIFIED ESTROGEN RECEPTOR STATUS (H): ICD-10-CM

## 2018-02-06 DIAGNOSIS — Z45.2 ENCOUNTER FOR CARE RELATED TO VASCULAR ACCESS PORT: ICD-10-CM

## 2018-02-06 DIAGNOSIS — R82.90 NONSPECIFIC FINDING ON EXAMINATION OF URINE: ICD-10-CM

## 2018-02-06 DIAGNOSIS — F43.21 ADJUSTMENT DISORDER WITH DEPRESSED MOOD: ICD-10-CM

## 2018-02-06 DIAGNOSIS — Z95.828 PORTACATH IN PLACE: ICD-10-CM

## 2018-02-06 DIAGNOSIS — Z96.611 S/P REVERSE TOTAL SHOULDER ARTHROPLASTY, RIGHT: ICD-10-CM

## 2018-02-06 LAB
ALBUMIN UR-MCNC: NEGATIVE MG/DL
APPEARANCE UR: CLEAR
BACTERIA #/AREA URNS HPF: ABNORMAL /HPF
BILIRUB UR QL STRIP: NEGATIVE
COLOR UR AUTO: YELLOW
GLUCOSE UR STRIP-MCNC: NEGATIVE MG/DL
HGB UR QL STRIP: ABNORMAL
KETONES UR STRIP-MCNC: NEGATIVE MG/DL
LEUKOCYTE ESTERASE UR QL STRIP: ABNORMAL
NITRATE UR QL: POSITIVE
NON-SQ EPI CELLS #/AREA URNS LPF: ABNORMAL /LPF
PH UR STRIP: 6.5 PH (ref 5–7)
RBC #/AREA URNS AUTO: ABNORMAL /HPF
SOURCE: ABNORMAL
SP GR UR STRIP: 1.02 (ref 1–1.03)
UROBILINOGEN UR STRIP-ACNC: 0.2 EU/DL (ref 0.2–1)
WBC #/AREA URNS AUTO: ABNORMAL /HPF

## 2018-02-06 PROCEDURE — 99214 OFFICE O/P EST MOD 30 MIN: CPT | Performed by: PHYSICIAN ASSISTANT

## 2018-02-06 PROCEDURE — 81001 URINALYSIS AUTO W/SCOPE: CPT | Mod: 59 | Performed by: PHYSICIAN ASSISTANT

## 2018-02-06 PROCEDURE — 93000 ELECTROCARDIOGRAM COMPLETE: CPT | Performed by: PHYSICIAN ASSISTANT

## 2018-02-06 PROCEDURE — 80307 DRUG TEST PRSMV CHEM ANLYZR: CPT | Mod: 90 | Performed by: PHYSICIAN ASSISTANT

## 2018-02-06 PROCEDURE — 87086 URINE CULTURE/COLONY COUNT: CPT | Performed by: PHYSICIAN ASSISTANT

## 2018-02-06 PROCEDURE — 99000 SPECIMEN HANDLING OFFICE-LAB: CPT | Performed by: PHYSICIAN ASSISTANT

## 2018-02-06 RX ORDER — CIPROFLOXACIN 500 MG/1
500 TABLET, FILM COATED ORAL 2 TIMES DAILY
Qty: 14 TABLET | Refills: 0 | Status: SHIPPED | OUTPATIENT
Start: 2018-02-06 | End: 2018-02-16

## 2018-02-06 ASSESSMENT — PAIN SCALES - GENERAL: PAINLEVEL: NO PAIN (0)

## 2018-02-06 NOTE — MR AVS SNAPSHOT
After Visit Summary   2/6/2018    Michaela Dorman    MRN: [id number]           Patient Information     Date Of Birth          1950        Visit Information        Provider Department      2/6/2018 3:00 PM Phani Tapia PA-C [facility]        Today's Diagnoses     Preop general physical exam    -  1    Portacath in place        Encounter for care related to vascular access port        Adjustment disorder with depressed mood        S/P bilateral mastectomy        Anxiety        S/P reverse total shoulder arthroplasty, right        Chronic pain syndrome        Lumbar radiculopathy        Family history of stroke (cerebrovascular)        Malignant neoplasm of upper-outer quadrant of left female breast, unspecified estrogen receptor status (H)        Nonspecific finding on examination of urine          Care Instructions      Before Your Surgery      Call your surgeon if there is any change in your health. This includes signs of a cold or flu (such as a sore throat, runny nose, cough, rash or fever).    Do not smoke, drink alcohol or take over the counter medicine (unless your surgeon or primary care doctor tells you to) for the 24 hours before and after surgery.    If you take prescribed drugs: Follow your doctor s orders about which medicines to take and which to stop until after surgery.    Eating and drinking prior to surgery: follow the instructions from your surgeon    Take a shower or bath the night before surgery. Use the soap your surgeon gave you to gently clean your skin. If you do not have soap from your surgeon, use your regular soap. Do not shave or scrub the surgery site.  Wear clean pajamas and have clean sheets on your bed.           Follow-ups after your visit        Your next 10 appointments already scheduled     Feb 08, 2018  7:45 AM CST   Radiology Injections with Keyon Tamayo MD   [facility] ([place], [place]) ([facility] [place])    [address]  [address]  [place] [address]   [phone]            Feb 08, 2018  7:45 AM CST   XR LUMBAR TRANSFORAMINAL INJ SINGLE with FERNANDO   [facility] ([facility])    [address]  [place] [address]   [phone]           For nerve root injection, please send or bring copies of any MRIs or other scans you have had.  Bring a list of your current medicines to your exam. (Include vitamins, minerals and over-the-counter medicines.) Leave your valuables at home.  Plan to have someone drive you home afterward.  Stop taking the following medicines (but talk to your doctor first):   If you take blood thinners, you may need to stop taking them a few days before treatment. Talk to your doctor before stopping these medicines.Stop taking Coumadin (warfarin) 3 days before treatment. Restart the day after treatment.   If you take Plavix, Ticlid, Pletal or Persantine, please ask your doctor if you should stop these medicines. You may need extra tests on the morning of your scan.   If you take Xarelto (Rivaroxaban), you may need to stop taking it 24 hours before treatment. Talk to your doctor before stopping this medicine. Restart the day after treatment.  You may take your other medicines as normal.  Stop all food and drink (including water) 3 hours before your test or treatment.  Please tell the doctor:   If you are allergic to X-ray dye (contrast fluid).   If you may be pregnant.  Injections take about 30 to 45 minutes. Most people spend up to 2 hours in the clinic or hospital.  Please call the Imaging Department at your exam site with any questions.            Feb 14, 2018   Procedure with Keyon Blanco MD   [facility] ([facility])    [address] [place] [id number] [phone]           From [address]: Exit at [address] on south side of [place]. Turn right on [address]. Turn  left at stoplight on [address]. [facility] will be in view two blocks ahead            Feb 22, 2018 10:30 AM CST   Return Visit with Keyon Blanco MD   [facility] ([facility]    [address] [id number] [phone]              Future tests that were ordered for you today     Open Future Orders        Priority Expected Expires Ordered    Comprehensive metabolic panel Routine 2/12/2018 2/20/2018 2/6/2018    INR Routine 2/12/2018 2/20/2018 2/6/2018    CBC with platelets Routine 2/12/2018 2/20/2018 2/6/2018            Who to contact     If you have questions or need follow up information about today's clinic visit or your schedule please contact [facility] directly at [phone].  Normal or non-critical lab and imaging results will be communicated to you by MyChart, letter or phone within 4 business days after the clinic has received the results. If you do not hear from us within 7 days, please contact the clinic through [employer] or phone. If you have a critical or abnormal lab result, we will notify you by phone as soon as possible.  Submit refill requests through [employer] or call your pharmacy and they will forward the refill request to us. Please allow 3 business days for your refill to be completed.          Additional Information About Your Visit        MyChart Information     [employer] gives you secure access to your electronic health record. If you see a primary care provider, you can also send messages to your care team and make appointments. If you have questions, please call your primary care clinic.  If you do not have a primary care provider, please call [phone] and they will assist you.        Care EveryWhere ID     This is your Care EveryWhere ID. This could be used by other organizations to access your [place] medical records  [id number]        Your Vitals Were     Pulse Temperature Respirations Height BMI  "(Body Mass Index)       60 98.5  F (36.9  C) (Temporal) 18 5' 6\" (1.676 m) 22.65 kg/m2        Blood Pressure from Last 3 Encounters:   02/06/18 124/66   01/16/18 121/66   12/22/17 139/84    Weight from Last 3 Encounters:   02/06/18 140 lb 4.8 oz (63.6 kg)   02/02/18 137 lb 8 oz (62.4 kg)   01/16/18 140 lb 4.8 oz (63.6 kg)              We Performed the Following     *UA reflex to Microscopic and Culture ([place] and [facility] (except [place] and [place])     EKG 12-lead complete w/read - Clinics     [id number] - Pain Drug Screen, Urine, with reported meds ([facility])     Urine Culture Aerobic Bacterial     Urine Microscopic        Primary Care Provider Office Phone # Fax #    Phani Tapia PA-C [phone]       [place] [address]        Equal Access to Services     SHIVANI ARTIS : Hadii santa ku hadasho Soomaali, waaxda luqadaha, qaybta kaalmada adeegyada, waxay milena allen . So [facility] [phone].    ATENCIÓN: Si habla español, tiene a ordoñez disposición servicios gratuitos de asistencia lingüística. Llame al [phone].    We comply with applicable federal civil rights laws and [place] laws. We do not discriminate on the basis of race, color, national origin, age, disability, sex, sexual orientation, or gender identity.            Thank you!     Thank you for choosing [facility]  for your care. Our goal is always to provide you with excellent care. Hearing back from our patients is one way we can continue to improve our services. Please take a few minutes to complete the written survey that you may receive in the mail after your visit with us. Thank you!             Your Updated Medication List - Protect others around you: Learn how to safely use, store and throw away your medicines at www.disposemymeds.org.          This list is accurate as of 2/6/18  4:14 PM.  Always use your most recent med list.                   Brand " Name Dispense Instructions for use Diagnosis    atorvastatin 20 MG tablet    LIPITOR    90 tablet    Take 1 tablet (20 mg) by mouth daily    Hyperlipidemia LDL goal <130       FLUoxetine 40 MG capsule    PROzac    30 capsule    Take 1 capsule (40 mg) by mouth daily    Adjustment disorder with depressed mood       hydrochlorothiazide 25 MG tablet    HYDRODIURIL    90 tablet    TAKE ONE TABLET BY MOUTH ONCE DAILY    Essential hypertension       oxybutynin 5 MG tablet    DITROPAN    90 tablet    TAKE ONE-HALF TABLET BY MOUTH TWICE A DAY    Dysuria       oxyCODONE IR 5 MG tablet    ROXICODONE    20 tablet    Take 1 tablet (5 mg) by mouth every 6 hours as needed for pain    Chronic pain syndrome, Lumbar radiculopathy       oxyCODONE-acetaminophen 5-325 MG per tablet    PERCOCET    45 tablet    Take 1 tablet by mouth every 4 hours as needed for pain    Post-op pain, S/P reverse total shoulder arthroplasty, right, S/P bilateral mastectomy, Trochanteric bursitis, unspecified laterality       prochlorperazine 10 MG tablet    COMPAZINE    30 tablet    Take 1 tablet (10 mg) by mouth every 6 hours as needed (Nausea/Vomiting)    Encounter for antineoplastic chemotherapy       TYLENOL EX ST ARTHRITIS PAIN 500 MG tablet   Generic drug:  acetaminophen      Take 500-1,000 mg by mouth every 6 hours as needed for mild pain    Dermatitis due to sun       valACYclovir 1000 mg tablet    VALTREX    21 tablet    Take 1 tablet (1,000 mg) by mouth 3 times daily    Herpes zoster without complication

## 2018-02-07 LAB
BACTERIA SPEC CULT: NORMAL
Lab: NORMAL
SPECIMEN SOURCE: NORMAL

## 2018-02-12 DIAGNOSIS — M54.16 LUMBAR RADICULOPATHY: ICD-10-CM

## 2018-02-12 DIAGNOSIS — Z96.611 S/P REVERSE TOTAL SHOULDER ARTHROPLASTY, RIGHT: ICD-10-CM

## 2018-02-12 DIAGNOSIS — G89.4 CHRONIC PAIN SYNDROME: ICD-10-CM

## 2018-02-12 DIAGNOSIS — Z01.818 PREOP GENERAL PHYSICAL EXAM: ICD-10-CM

## 2018-02-12 DIAGNOSIS — Z95.828 PORTACATH IN PLACE: ICD-10-CM

## 2018-02-12 DIAGNOSIS — Z82.3 FAMILY HISTORY OF STROKE (CEREBROVASCULAR): ICD-10-CM

## 2018-02-12 DIAGNOSIS — Z11.59 NEED FOR HEPATITIS C SCREENING TEST: ICD-10-CM

## 2018-02-12 DIAGNOSIS — F41.9 ANXIETY: ICD-10-CM

## 2018-02-12 DIAGNOSIS — F43.21 ADJUSTMENT DISORDER WITH DEPRESSED MOOD: ICD-10-CM

## 2018-02-12 DIAGNOSIS — C50.412 MALIGNANT NEOPLASM OF UPPER-OUTER QUADRANT OF LEFT FEMALE BREAST, UNSPECIFIED ESTROGEN RECEPTOR STATUS (H): ICD-10-CM

## 2018-02-12 DIAGNOSIS — Z45.2 ENCOUNTER FOR CARE RELATED TO VASCULAR ACCESS PORT: ICD-10-CM

## 2018-02-12 DIAGNOSIS — Z90.13 S/P BILATERAL MASTECTOMY: ICD-10-CM

## 2018-02-12 LAB
ALBUMIN SERPL-MCNC: 4 G/DL (ref 3.4–5)
ALP SERPL-CCNC: 54 U/L (ref 40–150)
ALT SERPL W P-5'-P-CCNC: 22 U/L (ref 0–50)
ANION GAP SERPL CALCULATED.3IONS-SCNC: 7 MMOL/L (ref 3–14)
AST SERPL W P-5'-P-CCNC: 20 U/L (ref 0–45)
BILIRUB SERPL-MCNC: 0.4 MG/DL (ref 0.2–1.3)
BUN SERPL-MCNC: 20 MG/DL (ref 7–30)
CALCIUM SERPL-MCNC: 9.9 MG/DL (ref 8.5–10.1)
CHLORIDE SERPL-SCNC: 104 MMOL/L (ref 94–109)
CO2 SERPL-SCNC: 27 MMOL/L (ref 20–32)
CREAT SERPL-MCNC: 0.87 MG/DL (ref 0.52–1.04)
ERYTHROCYTE [DISTWIDTH] IN BLOOD BY AUTOMATED COUNT: 15.3 % (ref 10–15)
GFR SERPL CREATININE-BSD FRML MDRD: 64 ML/MIN/1.7M2
GLUCOSE SERPL-MCNC: 92 MG/DL (ref 70–99)
HCT VFR BLD AUTO: 37.6 % (ref 35–47)
HCV AB SERPL QL IA: NONREACTIVE
HGB BLD-MCNC: 12.1 G/DL (ref 11.7–15.7)
INR PPP: 0.88 (ref 0.86–1.14)
MCH RBC QN AUTO: 32.6 PG (ref 26.5–33)
MCHC RBC AUTO-ENTMCNC: 32.2 G/DL (ref 31.5–36.5)
MCV RBC AUTO: 101 FL (ref 78–100)
PAIN DRUG SCR UR W RPTD MEDS: NORMAL
PLATELET # BLD AUTO: 262 10E9/L (ref 150–450)
POTASSIUM SERPL-SCNC: 3.9 MMOL/L (ref 3.4–5.3)
PROT SERPL-MCNC: 7.7 G/DL (ref 6.8–8.8)
RBC # BLD AUTO: 3.71 10E12/L (ref 3.8–5.2)
SODIUM SERPL-SCNC: 138 MMOL/L (ref 133–144)
WBC # BLD AUTO: 5.4 10E9/L (ref 4–11)

## 2018-02-12 PROCEDURE — 85027 COMPLETE CBC AUTOMATED: CPT | Performed by: PHYSICIAN ASSISTANT

## 2018-02-12 PROCEDURE — 85610 PROTHROMBIN TIME: CPT | Performed by: PHYSICIAN ASSISTANT

## 2018-02-12 PROCEDURE — 80053 COMPREHEN METABOLIC PANEL: CPT | Performed by: PHYSICIAN ASSISTANT

## 2018-02-12 PROCEDURE — 36415 COLL VENOUS BLD VENIPUNCTURE: CPT | Performed by: PHYSICIAN ASSISTANT

## 2018-02-12 PROCEDURE — 86803 HEPATITIS C AB TEST: CPT | Performed by: PHYSICIAN ASSISTANT

## 2018-02-13 NOTE — H&P (VIEW-ONLY)
[facility]  [address] [address]  [phone]  Dept: [phone]    PRE-OP EVALUATION:  Today's date: 2018    Michaela Dorman (: 1950) presents for pre-operative evaluation assessment as requested by Dr. Blanco.  She requires evaluation and anesthesia risk assessment prior to undergoing surgery/procedure for treatment of port access removal .  Proposed procedure: right intra jugular port removal    Date of Surgery/ Procedure: 18  Time of Surgery/ Procedure: 730  Hospital/Surgical Facility: [facility]  Fax number for surgical facility:   Primary Physician: Phani Tapia  Type of Anesthesia Anticipated: Local with MAC    Patient has a Health Care Directive or Living Will:      Preop Questions 2/3/2018   1.  Do you have a history of heart attack, stroke, stent, bypass or surgery on an artery in the head, neck, heart or legs? No   2.  Do you ever have any pain or discomfort in your chest? No   3.  Do you have a history of  Heart Failure? No   4.   Are you troubled by shortness of breath when:  walking on a level surface, or up a slight hill, or at night? No   5.  Do you currently have a cold, bronchitis or other respiratory infection? No   6.  Do you have a cough, shortness of breath, or wheezing? No   7.  Do you sometimes get pains in the calves of your legs when you walk? No   8. Do you or anyone in your family have previous history of blood clots? No   9.  Do you or does anyone in your family have a serious bleeding problem such as prolonged bleeding following surgeries or cuts? No   10. Have you ever had problems with anemia or been told to take iron pills? No   11. Have you had any abnormal blood loss such as black, tarry or bloody stools, or abnormal vaginal bleeding? No   12. Have you ever had a blood transfusion? No   13. Have you or any of your relatives ever had problems with anesthesia? No   14. Do you have sleep apnea, excessive snoring or  daytime drowsiness? No   15. Do you have any prosthetic heart valves? No   16. Do you have prosthetic joints? YES -    17. Is there any chance that you may be pregnant? No         HPI:                                                      Brief HPI related to upcoming procedure: has had a vascular access for chemo in need of removal      See problem list for active medical problems.  Problems all longstanding and stable, except as noted/documented.  See ROS for pertinent symptoms related to these conditions.                                                                                                  .    MEDICAL HISTORY:                                                      Patient Active Problem List    Diagnosis Date Noted     Foraminal stenosis of lumbar region 12/01/2017     Priority: Medium     Complete lumbar spine left at various degrees and to a lesser extent the right as well.       Central stenosis of spinal canal 12/01/2017     Priority: Medium     lumbar         DDD (degenerative disc disease), lumbar 12/01/2017     Priority: Medium     Chronic pain syndrome 11/30/2017     Priority: Medium     substancePatient is followed by Phani Jason PA-C for ongoing prescription of pain medication.  All refills should only be approved by this provider, or covering partner.    Medication(s): Oxycodone IR 5mg.   Maximum quantity per month: 20  Clinic visit frequency required: Q 3 months     Controlled substance agreement:  Encounter-Level CSA:     There are no encounter-level csa.        Pain Clinic evaluation in the past: Yes       Date/Location:      DIRE Total Score(s):  No flowsheet data found.    Last [facility] website verification:  none   [email or web address]             Lumbar radiculopathy 11/30/2017     Priority: Medium     S/P reverse total shoulder arthroplasty, right 06/05/2017     Priority: Medium     Prophylactic antibiotic for dental procedure indicated due to prior joint replacement 06/05/2017      Priority: Medium     Hyperlipidemia LDL goal <130 05/30/2017     Priority: Medium     Anxiety 03/29/2017     Priority: Medium     S/P bilateral mastectomy 02/08/2017     Priority: Medium     Encounter for antineoplastic chemotherapy 10/07/2016     Priority: Medium     Malignant neoplasm of upper-outer quadrant of left female breast (H) 10/06/2016     Priority: Medium     Adjustment disorder with depressed mood 11/11/2015     Priority: Medium     Essential hypertension 11/11/2015     Priority: Medium     Baker's cyst of knee 02/09/2015     Priority: Medium     Patella-femoral syndrome 02/09/2015     Priority: Medium     Health Care Home 09/29/2011     Priority: Medium     x  DX V65.8 REPLACED WITH 486[facility] HEALTH CARE HOME (04/08/2013)       Advanced directives, counseling/discussion 08/22/2011     Priority: Medium     Advance Directive Problem List Overview:   Name Relationship Phone    Primary Health Care Agent            Alternative Health Care Agent          Patient states has Advance Directive and will bring in a copy to clinic. 8/22/2011          Trochanteric bursitis 01/06/2009     Priority: Medium     Family history of stroke (cerebrovascular) 03/07/2008     Priority: Medium     Enthesopathy of hip region 01/30/2006     Priority: Medium      Past Medical History:   Diagnosis Date     Central stenosis of spinal canal 12/1/2017    lumbar      DDD (degenerative disc disease), lumbar 12/1/2017     Depressive disorder, not elsewhere classified      Esophageal reflux      ETOH abuse     in remission     Foraminal stenosis of lumbar region 12/1/2017    Complete lumbar spine left at various degrees and to a lesser extent the right as well.     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     Subdural hematoma (H)      Past Surgical History:   Procedure Laterality Date      ARTHROPLASTY SHOULDER Right 11/17/2015     ARTHROSCOPY KNEE  6/7/2012    Procedure:ARTHROSCOPY KNEE; right knee arthroscopy with partial lateral menisectomy; Surgeon:DAMIÁN MCALLISTER; Location:[facility] OR     C LIGATE FALLOPIAN TUBE       C NONSPECIFIC PROCEDURE  1956    ankle fracture surgery     COLONOSCOPY N/A 12/22/2017    Procedure: COLONOSCOPY;  colonoscopy;  Surgeon: Chuck Chand MD;  Location: [facility] GI     EXCISE MASS AXILLA Left 9/16/2016    Procedure: EXCISE MASS AXILLA;  Surgeon: Keyon Blanco MD;  Location: [facility] OR     HC REMV CATARACT EXTRACAP,INSERT LENS  6/25/2009    Right eye     INSERT PORT VASCULAR ACCESS Right 10/7/2016    Procedure: INSERT PORT VASCULAR ACCESS;  Surgeon: Keyon Blanco MD;  Location: [facility]     MASTECTOMY SIMPLE BILATERAL Bilateral 2/8/2017    Procedure: MASTECTOMY SIMPLE BILATERAL;  Surgeon: Keyon Blanco MD;  Location: [facility] OR     OPEN REDUCTION INTERNAL FIXATION FOOT Right 3/20/2015    Procedure: OPEN REDUCTION INTERNAL FIXATION FOOT;  Surgeon: Javi Herrmann DPM;  Location: [facility] OR     SHOULDER SURGERY Right 11/2015     Current Outpatient Prescriptions   Medication Sig Dispense Refill     prochlorperazine (COMPAZINE) 10 MG tablet Take 1 tablet (10 mg) by mouth every 6 hours as needed (Nausea/Vomiting) 30 tablet 3     valACYclovir (VALTREX) 1000 mg tablet Take 1 tablet (1,000 mg) by mouth 3 times daily 21 tablet 0     Neratinib Maleate (NERLYNX) 40 MG TABS tablet CHEMO Take 6 tablets (240 mg) by mouth daily 180 tablet 3     FLUoxetine (PROZAC) 40 MG capsule Take 1 capsule (40 mg) by mouth daily 30 capsule 1     venlafaxine (EFFEXOR XR) 37.5 MG 24 hr capsule Take 1 capsule (37.5 mg) by mouth daily With the 75 mg. 90 capsule 0     venlafaxine (EFFEXOR-XR) 75 MG 24 hr capsule Take 1 capsule (75 mg) by mouth daily Along with 37.5 mg cap. 90 capsule 0     oxyCODONE-acetaminophen (PERCOCET) 5-325 MG per tablet Take 1 tablet by mouth every 4 hours as needed for pain 45 tablet 0      hydrochlorothiazide (HYDRODIURIL) 25 MG tablet TAKE ONE TABLET BY MOUTH ONCE DAILY 90 tablet 1     oxyCODONE IR (ROXICODONE) 5 MG tablet Take 1 tablet (5 mg) by mouth every 6 hours as needed for pain 20 tablet 0     oxybutynin (DITROPAN) 5 MG tablet TAKE ONE-HALF TABLET BY MOUTH TWICE A DAY 90 tablet 1     atorvastatin (LIPITOR) 20 MG tablet Take 1 tablet (20 mg) by mouth daily 90 tablet 1     oxybutynin (DITROPAN) 5 MG tablet TAKE ONE-HALF TABLET BY MOUTH TWICE A DAY 90 tablet 0     LORazepam (ATIVAN) 0.5 MG tablet Take 1 tablet (0.5 mg) by mouth every 4 hours as needed (Anxiety, Nausea/Vomiting or Sleep) 15 tablet 0     acetaminophen (TYLENOL EX ST ARTHRITIS PAIN) 500 MG tablet Take 500-1,000 mg by mouth every 6 hours as needed for mild pain       OTC products: None, except as noted above    Allergies   Allergen Reactions     No Known Drug Allergies       Latex Allergy: NO    Social History   Substance Use Topics     Smoking status: Former Smoker     Packs/day: 3.00     Years: 10.00     Types: Cigarettes     Quit date: 12/1/1979     Smokeless tobacco: Never Used      Comment: approx. 3 packs daily     Alcohol use 0.0 oz/week     0 Standard drinks or equivalent per week      Comment: daily glass of wine     History   Drug Use No       REVIEW OF SYSTEMS:                                                    C: NEGATIVE for fever, chills, change in weight  I: NEGATIVE for worrisome rashes, moles or lesions  E: NEGATIVE for vision changes or irritation  E/M: NEGATIVE for ear, mouth and throat problems  R: NEGATIVE for significant cough or SOB  B: NEGATIVE for masses, tenderness or discharge  CV: NEGATIVE for chest pain, palpitations or peripheral edema  GI: NEGATIVE for nausea, abdominal pain, heartburn, or change in bowel habits  : NEGATIVE for frequency, dysuria, or hematuria  M: NEGATIVE for significant arthralgias or myalgia  N: NEGATIVE for weakness, dizziness or paresthesias  E: NEGATIVE for temperature  "intolerance, skin/hair changes  H: NEGATIVE for bleeding problems  P: NEGATIVE for changes in mood or affect    EXAM:                                                    /66 (Cuff Size: Adult Regular)  Pulse 60  Temp 98.5  F (36.9  C) (Temporal)  Resp 18  Ht 5' 6\" (1.676 m)  Wt 140 lb 4.8 oz (63.6 kg)  BMI 22.65 kg/m2    GENERAL APPEARANCE: healthy, alert and no distress     EYES: EOMI, PERRL     HENT: ear canals and TM's normal and nose and mouth without ulcers or lesions     NECK: no adenopathy, no asymmetry, masses, or scars and thyroid normal to palpation     RESP: lungs clear to auscultation - no rales, rhonchi or wheezes     CV: regular rates and rhythm, normal S1 S2, no S3 or S4 and no murmur, click or rub     ABDOMEN:  soft, nontender, no HSM or masses and bowel sounds normal     MS: extremities normal- no gross deformities noted, no evidence of inflammation in joints, FROM in all extremities.     NEURO: Normal strength and tone, sensory exam grossly normal, mentation intact and speech normal     PSYCH: mentation appears normal. and affect normal/bright    DIAGNOSTICS:                                                      EKG: appears normal, NSR, normal axis, normal intervals, no acute ST/T changes c/w ischemia, no LVH by voltage criteria, unchanged from previous tracings  Labs Drawn and in Process:   Unresulted Labs Ordered in the Past 30 Days of this Admission     No orders found from 12/8/2017 to 2/7/2018.          Recent Labs   Lab Test  01/16/18   1405  10/24/17   1025   01/24/17   1016   HGB  11.8  11.7   < >  9.6*   PLT  229  294   < >  315   NA  139  139   < >  138   POTASSIUM  3.6  3.9   < >  3.9   CR  1.01  0.97   < >  0.92   A1C   --    --    --   5.0    < > = values in this interval not displayed.        IMPRESSION:                                                    Reason for surgery/procedure: vascular access site removal  Diagnosis/reason for consult: anesthesia / surgical " clearance.    The proposed surgical procedure is considered INTERMEDIATE risk.    REVISED CARDIAC RISK INDEX  The patient has the following serious cardiovascular risks for perioperative complications such as (MI, PE, VFib and 3  AV Block):  No serious cardiac risks  INTERPRETATION: 1 risks: Class II (low risk - 0.9% complication rate)    The patient has the following additional risks for perioperative complications:  No identified additional risks  The 10-year ASCVD risk score (Nadiraaldair GIRON Jr, et al., 2013) is: 7.7%    Values used to calculate the score:      Age: 67 years      Sex: Female      Is Non- : No      Diabetic: No      Tobacco smoker: No      Systolic Blood Pressure: 124 mmHg      Is BP treated: No      HDL Cholesterol: 67 mg/dL      Total Cholesterol: 273 mg/dL      ICD-10-CM    1. Preop general physical exam Z01.818        RECOMMENDATIONS:                                                        Pulmonary Risk  Incentive spirometry post op  Respiratory Therapy (Respiratory Care IP Consult)  post op  NG tube decompression if abdominal distension or significant vomiting       --Patient is to take all scheduled medications on the day of surgery EXCEPT for modifications listed below.    APPROVAL GIVEN to proceed with proposed procedure, without further diagnostic evaluation       Signed Electronically by: Phani Jason PA-C    Copy of this evaluation report is provided to requesting physician.    [facility] Preop Guidelines

## 2018-02-14 ENCOUNTER — ANESTHESIA (OUTPATIENT)
Dept: SURGERY | Facility: CLINIC | Age: 68
End: 2018-02-14
Payer: MEDICARE

## 2018-02-14 ENCOUNTER — HOSPITAL ENCOUNTER (OUTPATIENT)
Facility: CLINIC | Age: 68
Discharge: HOME | End: 2018-02-14
Attending: SPECIALIST | Admitting: SPECIALIST
Payer: MEDICARE

## 2018-02-14 ENCOUNTER — ANESTHESIA EVENT (OUTPATIENT)
Dept: SURGERY | Facility: CLINIC | Age: 68
End: 2018-02-14
Payer: MEDICARE

## 2018-02-14 ENCOUNTER — SURGERY (OUTPATIENT)
Age: 68
End: 2018-02-14

## 2018-02-14 VITALS
HEIGHT: 66 IN | TEMPERATURE: 98.6 F | SYSTOLIC BLOOD PRESSURE: 136 MMHG | BODY MASS INDEX: 22.55 KG/M2 | OXYGEN SATURATION: 94 % | RESPIRATION RATE: 16 BRPM | DIASTOLIC BLOOD PRESSURE: 78 MMHG | WEIGHT: 140.3 LBS

## 2018-02-14 DIAGNOSIS — G89.18 POST-OP PAIN: Primary | ICD-10-CM

## 2018-02-14 PROCEDURE — 25000128 H RX IP 250 OP 636: Performed by: SPECIALIST

## 2018-02-14 PROCEDURE — 25000128 H RX IP 250 OP 636: Performed by: NURSE ANESTHETIST, CERTIFIED REGISTERED

## 2018-02-14 PROCEDURE — 25000125 ZZHC RX 250: Performed by: NURSE ANESTHETIST, CERTIFIED REGISTERED

## 2018-02-14 PROCEDURE — 37000009 ZZH ANESTHESIA TECHNICAL FEE, EACH ADDTL 15 MIN: Performed by: SPECIALIST

## 2018-02-14 PROCEDURE — 37000008 ZZH ANESTHESIA TECHNICAL FEE, 1ST 30 MIN: Performed by: SPECIALIST

## 2018-02-14 PROCEDURE — 40000306 ZZH STATISTIC PRE PROC ASSESS II: Performed by: SPECIALIST

## 2018-02-14 PROCEDURE — 36000050 ZZH SURGERY LEVEL 2 1ST 30 MIN: Performed by: SPECIALIST

## 2018-02-14 PROCEDURE — 27210794 ZZH OR GENERAL SUPPLY STERILE: Performed by: SPECIALIST

## 2018-02-14 PROCEDURE — 36000052 ZZH SURGERY LEVEL 2 EA 15 ADDTL MIN: Performed by: SPECIALIST

## 2018-02-14 PROCEDURE — 71000027 ZZH RECOVERY PHASE 2 EACH 15 MINS: Performed by: SPECIALIST

## 2018-02-14 PROCEDURE — 36590 REMOVAL TUNNELED CV CATH: CPT | Performed by: SPECIALIST

## 2018-02-14 PROCEDURE — 25000125 ZZHC RX 250: Performed by: SPECIALIST

## 2018-02-14 RX ORDER — PROPOFOL 10 MG/ML
INJECTION, EMULSION INTRAVENOUS PRN
Status: DISCONTINUED | OUTPATIENT
Start: 2018-02-14 | End: 2018-02-14

## 2018-02-14 RX ORDER — ONDANSETRON 4 MG/1
4 TABLET, ORALLY DISINTEGRATING ORAL EVERY 30 MIN PRN
Status: DISCONTINUED | OUTPATIENT
Start: 2018-02-14 | End: 2018-02-14 | Stop reason: HOSPADM

## 2018-02-14 RX ORDER — OXYCODONE HYDROCHLORIDE 5 MG/1
5 TABLET ORAL
Status: DISCONTINUED | OUTPATIENT
Start: 2018-02-14 | End: 2018-02-14 | Stop reason: HOSPADM

## 2018-02-14 RX ORDER — NALOXONE HYDROCHLORIDE 0.4 MG/ML
.1-.4 INJECTION, SOLUTION INTRAMUSCULAR; INTRAVENOUS; SUBCUTANEOUS
Status: DISCONTINUED | OUTPATIENT
Start: 2018-02-14 | End: 2018-02-14 | Stop reason: HOSPADM

## 2018-02-14 RX ORDER — LIDOCAINE HYDROCHLORIDE 10 MG/ML
INJECTION, SOLUTION INFILTRATION; PERINEURAL PRN
Status: DISCONTINUED | OUTPATIENT
Start: 2018-02-14 | End: 2018-02-14 | Stop reason: HOSPADM

## 2018-02-14 RX ORDER — FENTANYL CITRATE 50 UG/ML
25-50 INJECTION, SOLUTION INTRAMUSCULAR; INTRAVENOUS
Status: DISCONTINUED | OUTPATIENT
Start: 2018-02-14 | End: 2018-02-14 | Stop reason: HOSPADM

## 2018-02-14 RX ORDER — ONDANSETRON 2 MG/ML
INJECTION INTRAMUSCULAR; INTRAVENOUS PRN
Status: DISCONTINUED | OUTPATIENT
Start: 2018-02-14 | End: 2018-02-14

## 2018-02-14 RX ORDER — LIDOCAINE HYDROCHLORIDE 20 MG/ML
INJECTION, SOLUTION INFILTRATION; PERINEURAL PRN
Status: DISCONTINUED | OUTPATIENT
Start: 2018-02-14 | End: 2018-02-14

## 2018-02-14 RX ORDER — ALBUTEROL SULFATE 0.83 MG/ML
2.5 SOLUTION RESPIRATORY (INHALATION) EVERY 4 HOURS PRN
Status: DISCONTINUED | OUTPATIENT
Start: 2018-02-14 | End: 2018-02-14 | Stop reason: HOSPADM

## 2018-02-14 RX ORDER — MEPERIDINE HYDROCHLORIDE 25 MG/ML
12.5 INJECTION INTRAMUSCULAR; INTRAVENOUS; SUBCUTANEOUS
Status: DISCONTINUED | OUTPATIENT
Start: 2018-02-14 | End: 2018-02-14 | Stop reason: HOSPADM

## 2018-02-14 RX ORDER — HYDROMORPHONE HYDROCHLORIDE 1 MG/ML
.3-.5 INJECTION, SOLUTION INTRAMUSCULAR; INTRAVENOUS; SUBCUTANEOUS EVERY 10 MIN PRN
Status: DISCONTINUED | OUTPATIENT
Start: 2018-02-14 | End: 2018-02-14 | Stop reason: HOSPADM

## 2018-02-14 RX ORDER — ONDANSETRON 2 MG/ML
4 INJECTION INTRAMUSCULAR; INTRAVENOUS EVERY 30 MIN PRN
Status: DISCONTINUED | OUTPATIENT
Start: 2018-02-14 | End: 2018-02-14 | Stop reason: HOSPADM

## 2018-02-14 RX ORDER — SODIUM CHLORIDE, SODIUM LACTATE, POTASSIUM CHLORIDE, CALCIUM CHLORIDE 600; 310; 30; 20 MG/100ML; MG/100ML; MG/100ML; MG/100ML
INJECTION, SOLUTION INTRAVENOUS CONTINUOUS
Status: DISCONTINUED | OUTPATIENT
Start: 2018-02-14 | End: 2018-02-14 | Stop reason: HOSPADM

## 2018-02-14 RX ORDER — FENTANYL CITRATE 50 UG/ML
INJECTION, SOLUTION INTRAMUSCULAR; INTRAVENOUS PRN
Status: DISCONTINUED | OUTPATIENT
Start: 2018-02-14 | End: 2018-02-14

## 2018-02-14 RX ORDER — CEFAZOLIN SODIUM 2 G/100ML
2 INJECTION, SOLUTION INTRAVENOUS
Status: COMPLETED | OUTPATIENT
Start: 2018-02-14 | End: 2018-02-14

## 2018-02-14 RX ORDER — PROPOFOL 10 MG/ML
INJECTION, EMULSION INTRAVENOUS CONTINUOUS PRN
Status: DISCONTINUED | OUTPATIENT
Start: 2018-02-14 | End: 2018-02-14

## 2018-02-14 RX ORDER — BUPIVACAINE HYDROCHLORIDE AND EPINEPHRINE 2.5; 5 MG/ML; UG/ML
INJECTION, SOLUTION INFILTRATION; PERINEURAL PRN
Status: DISCONTINUED | OUTPATIENT
Start: 2018-02-14 | End: 2018-02-14 | Stop reason: HOSPADM

## 2018-02-14 RX ORDER — DIMENHYDRINATE 50 MG/ML
25 INJECTION, SOLUTION INTRAMUSCULAR; INTRAVENOUS
Status: DISCONTINUED | OUTPATIENT
Start: 2018-02-14 | End: 2018-02-14 | Stop reason: HOSPADM

## 2018-02-14 RX ORDER — HYDRALAZINE HYDROCHLORIDE 20 MG/ML
2.5-5 INJECTION INTRAMUSCULAR; INTRAVENOUS EVERY 10 MIN PRN
Status: DISCONTINUED | OUTPATIENT
Start: 2018-02-14 | End: 2018-02-14 | Stop reason: HOSPADM

## 2018-02-14 RX ORDER — OXYCODONE AND ACETAMINOPHEN 5; 325 MG/1; MG/1
1-2 TABLET ORAL EVERY 4 HOURS PRN
Qty: 10 TABLET | Refills: 0 | Status: SHIPPED | OUTPATIENT
Start: 2018-02-14 | End: 2018-02-26

## 2018-02-14 RX ORDER — CEFAZOLIN SODIUM 1 G/3ML
1 INJECTION, POWDER, FOR SOLUTION INTRAMUSCULAR; INTRAVENOUS SEE ADMIN INSTRUCTIONS
Status: DISCONTINUED | OUTPATIENT
Start: 2018-02-14 | End: 2018-02-14 | Stop reason: HOSPADM

## 2018-02-14 RX ADMIN — LIDOCAINE HYDROCHLORIDE 1 ML: 10 INJECTION, SOLUTION EPIDURAL; INFILTRATION; INTRACAUDAL; PERINEURAL at 06:43

## 2018-02-14 RX ADMIN — PROPOFOL 150 MCG/KG/MIN: 10 INJECTION, EMULSION INTRAVENOUS at 07:48

## 2018-02-14 RX ADMIN — LIDOCAINE HYDROCHLORIDE 60 MG: 20 INJECTION, SOLUTION INFILTRATION; PERINEURAL at 07:48

## 2018-02-14 RX ADMIN — CEFAZOLIN SODIUM 2 G: 2 INJECTION, SOLUTION INTRAVENOUS at 07:49

## 2018-02-14 RX ADMIN — BUPIVACAINE HYDROCHLORIDE AND EPINEPHRINE BITARTRATE 9.5 ML: 2.5; .005 INJECTION, SOLUTION INFILTRATION; PERINEURAL at 08:11

## 2018-02-14 RX ADMIN — MIDAZOLAM 2 MG: 1 INJECTION INTRAMUSCULAR; INTRAVENOUS at 07:44

## 2018-02-14 RX ADMIN — SODIUM CHLORIDE, POTASSIUM CHLORIDE, SODIUM LACTATE AND CALCIUM CHLORIDE: 600; 310; 30; 20 INJECTION, SOLUTION INTRAVENOUS at 06:43

## 2018-02-14 RX ADMIN — PROPOFOL 50 MG: 10 INJECTION, EMULSION INTRAVENOUS at 07:48

## 2018-02-14 RX ADMIN — ONDANSETRON 4 MG: 2 INJECTION INTRAMUSCULAR; INTRAVENOUS at 08:09

## 2018-02-14 RX ADMIN — LIDOCAINE HYDROCHLORIDE 9.5 ML: 10 INJECTION, SOLUTION INFILTRATION; PERINEURAL at 08:11

## 2018-02-14 RX ADMIN — FENTANYL CITRATE 100 MCG: 50 INJECTION, SOLUTION INTRAMUSCULAR; INTRAVENOUS at 07:44

## 2018-02-14 ASSESSMENT — LIFESTYLE VARIABLES: TOBACCO_USE: 1

## 2018-02-14 NOTE — IP AVS SNAPSHOT
[facility]    [address]     [place] [address]    Phone:  [phone]                                       After Visit Summary   2/14/2018    Michaela Dorman    MRN: [id number]           After Visit Summary Signature Page     I have received my discharge instructions, and my questions have been answered. I have discussed any challenges I see with this plan with the nurse or doctor.    ..........................................................................................................................................  Patient/Patient Representative Signature      ..........................................................................................................................................  Patient Representative Print Name and Relationship to Patient    ..................................................               ................................................  Date                                            Time    ..........................................................................................................................................  Reviewed by Signature/Title    ...................................................              ..............................................  Date                                                            Time

## 2018-02-14 NOTE — ANESTHESIA CARE TRANSFER NOTE
Patient: Michaela Dorman    Procedure(s):  right intra jugular port removal - Wound Class: I-Clean    Diagnosis: right IJ power port  Diagnosis Additional Information: No value filed.    Anesthesia Type:   MAC     Note:  Airway :Face Mask  Patient transferred to:Phase II  Handoff Report: Identifed the Patient, Identified the Reponsible Provider, Reviewed the pertinent medical history, Discussed the surgical course, Reviewed Intra-OP anesthesia mangement and issues during anesthesia, Set expectations for post-procedure period and Allowed opportunity for questions and acknowledgement of understanding      Vitals: (Last set prior to Anesthesia Care Transfer)    CRNA VITALS  2/14/2018 0752 - 2/14/2018 0852      2/14/2018             Resp Rate (observed): 10                Electronically Signed By: LISA Duque CRNA  February 14, 2018  11:30 AM

## 2018-02-14 NOTE — ANESTHESIA POSTPROCEDURE EVALUATION
Patient: Michaela Dorman    Procedure(s):  right intra jugular port removal - Wound Class: I-Clean    Diagnosis:right IJ power port  Diagnosis Additional Information: No value filed.    Anesthesia Type:  MAC    Note:  Anesthesia Post Evaluation    Patient location during evaluation: Phase 2  Patient participation: Able to fully participate in evaluation  Level of consciousness: awake  Pain management: adequate  Airway patency: patent  Cardiovascular status: acceptable and hemodynamically stable  Respiratory status: acceptable, room air and nonlabored ventilation  Hydration status: stable  PONV: none     Anesthetic complications: None    Comments: Patient was happy with the anesthesia care received and no anesthesia related complications were noted.  I will follow up with the patient again if it is needed.        Last vitals:  Vitals:    02/14/18 0830 02/14/18 0845 02/14/18 0900   BP: 112/59 130/75 136/78   Resp: 12 16 16   Temp:      SpO2: 97% 93% 94%         Electronically Signed By: LISA Duque CRNA  February 14, 2018  11:32 AM

## 2018-02-14 NOTE — ANESTHESIA PREPROCEDURE EVALUATION
Anesthesia Evaluation     . Pt has had prior anesthetic. Type: General and MAC    No history of anesthetic complications          ROS/MED HX    ENT/Pulmonary:     (+)ANTON risk factors hypertension, tobacco use, Past use , . .    Neurologic:  - neg neurologic ROS     Cardiovascular:     (+) hypertension----. : . . . :. . Previous cardiac testing date:results:date: results:ECG reviewed date:1/24/17 results:SR within normal limits. date: results:          METS/Exercise Tolerance:     Hematologic:  - neg hematologic  ROS       Musculoskeletal:  - neg musculoskeletal ROS      Lower extremity injury: ORIF Foot 2015.   GI/Hepatic:     (+) GERD Asymptomatic on medication,       Renal/Genitourinary:  - ROS Renal section negative   (+) Pt has no history of transplant,       Endo:  - neg endo ROS       Psychiatric:     (+) psychiatric history depression      Infectious Disease:  - neg infectious disease ROS       Malignancy:   (+) Malignancy History of Breast  Breast CA Active status post Chemo.         Other:    - neg other ROS                 Physical Exam  Normal systems: cardiovascular, pulmonary and dental    Airway   Mallampati: II  TM distance: >3 FB  Neck ROM: full    Dental     Cardiovascular   Rhythm and rate: regular and normal      Pulmonary    breath sounds clear to auscultation                    Anesthesia Plan      History & Physical Review  History and physical reviewed and following examination; no interval change.    ASA Status:  2 .    NPO Status:  > 8 hours    Plan for MAC with Intravenous and Propofol induction. Maintenance will be TIVA.  Reason for MAC:  Deep or markedly invasive procedure (G8)  PONV prophylaxis:  Ondansetron (or other 5HT-3)       Postoperative Care  Postoperative pain management:  IV analgesics and Oral pain medications.      Consents  Anesthetic plan, risks, benefits and alternatives discussed with:  Patient.  Use of blood products discussed: No .   .                          .

## 2018-02-14 NOTE — BRIEF OP NOTE
[facility] Brief Operative Note    Pre-operative diagnosis: right IJ power port   Post-operative diagnosis Same   Procedure: Procedure(s):  right intra jugular port removal - Wound Class: I-Clean   Surgeon: Keyon Blanco MD, FACS   Assistants(s): Keyanna Arthur MS3   Estimated blood loss: Minimal    Specimens: None   Findings: Well encapsulated port     Keyon Blanco MD, FACS      #[id number]

## 2018-02-14 NOTE — DISCHARGE INSTRUCTIONS
[facility] Anesthesia  Adult Discharge Orders & Instructions     For 24 hours after surgery    1. Get plenty of rest.  A responsible adult must stay with you for at least 24 hours after you leave the hospital.   2. Do not drive or use heavy equipment.  If you have weakness or tingling, don't drive or use heavy equipment until this feeling goes away.  3. Do not drink alcohol.  4. Avoid strenuous or risky activities.  Ask for help when climbing stairs.   5. You may feel lightheaded.  IF so, sit for a few minutes before standing.  Have someone help you get up.   6. If you have nausea (feel sick to your stomach): Drink only clear liquids such as apple juice, ginger ale, broth or 7-Up.  Rest may also help.  Be sure to drink enough fluids.  Move to a regular diet as you feel able.  7. You may have a slight fever. Call the doctor if your fever is over 100 F (37.7 C) (taken under the tongue) or lasts longer than 24 hours.  8. You may have a dry mouth, a sore throat, muscle aches or trouble sleeping.  These should go away after 24 hours.  9. Do not make important or legal decisions.   Call your doctor for any of the followin.  Signs of infection (fever, growing tenderness at the surgery site, a large amount of drainage or bleeding, severe pain, foul-smelling drainage, redness, swelling).    2. It has been over 8 to 10 hours since surgery and you are still not able to urinate (pass water.    To contact Dr. Blanco call [phone]

## 2018-02-14 NOTE — OP NOTE
Procedure Date: 02/14/2018      DATE OF PROCEDURE:  02/14/2018      PREOPERATIVE DIAGNOSIS:  Right internal jugular PowerPort.      POSTOPERATIVE DIAGNOSIS:  Right internal jugular PowerPort.      PROCEDURE:  Removal of right internal jugular PowerPort.      SURGEON:  Keyon Blanco MD, FACS       ASSISTANT:  Masha Gay MS3      ANESTHESIA:  MAC.      INDICATIONS FOR PROCEDURE:  67-year-old lady who was diagnosed with breast cancer, has completed her course of chemotherapy, and opted to have her port removed.      OPERATIVE FINDINGS:  Included well encapsulated port.      DETAILS OF PROCEDURE:  In the preoperative holding area with the cooperation of the patient, the port site was marked.  She was then taken to the operating room after receiving sedation.  The right chest and neck were prepped and draped in sterile fashion.  A timeout was performed confirming the identify of the patient as well as the procedure to be performed.  The area around the port was infiltrated with local anesthetic.  After adequate analgesia was achieved, the old transverse incision was opened.  Subcutaneous tissue was opened using cautery.  The port was identified and the capsule was opened using Metzenbaum scissors.  The port was removed from the capsule.  The patient was placed in Trendelenburg position.  The port was removed and the catheter was intact.  Direct pressure was held for 5 minutes by the clock.  After removing the port and with holding pressure, the patient was placed in reverse Trendelenburg position and pressure was held for 5 minutes by the clock.  The capsule was removed using cautery.  Hemostasis was achieved using cautery.  The subcutaneous tissue was then reapproximated using 3-0 Vicryl.  Skin was closed using a running 4-0 Monocryl subcuticular stitch.  Steri-Strips and sterile dressings were applied and the patient was taken from the operating room to the recovery room in stable condition to be sent home.          MECCA NASH MD, FACS             D: 2018   T: 2018   MT: EDISON      Name:     NICHOLAS DONNELLY   MRN:      [id number]        Account:        [id number]   :      1950           Procedure Date: 2018      Document: [id number]

## 2018-02-14 NOTE — IP AVS SNAPSHOT
MRN:[id number]                      After Visit Summary   2/14/2018    Michaela Dorman    MRN: [id number]           Thank you!     Thank you for choosing [facility] for your care. Our goal is always to provide you with excellent care. Hearing back from our patients is one way we can continue to improve our services. Please take a few minutes to complete the written survey that you may receive in the mail after you visit with us. Thank you!        Patient Information     Date Of Birth          1950        About your hospital stay     You were admitted on:  February 14, 2018 You last received care in the:  [facility]    You were discharged on:  February 14, 2018       Who to Call     For medical emergencies, please call 911.  For non-urgent questions about your medical care, please call your primary care provider or clinic, [phone]  For questions related to your surgery, please call your surgery clinic        Attending Provider     Provider Specialty    Keyon Blanco MD General Surgery       Primary Care Provider Office Phone # Fax #    Phani Tapia PA-C [phone]      After Care Instructions     Diet Instructions       Resume pre-procedure diet            Discharge Instructions       Follow up appointment as instructed by Surgeon and or RN            Do not - immerse incision in water until sutures removed       Do not immerse incision in water until sutures removed            Dressing       Keep dressing clean and dry.  Dressing / incisional care as instructed by RN and or Surgeon            Ice to affected area       Ice to operative site PRN            No Alcohol       For 24 hours post procedure            No driving or operating machinery        until the day after procedure            No lifting        No lifting over 20 lbs and no strenuous physical activity for 2 weeks            Shower       No shower for 24 hours post procedure. May shower  Postoperative Day (POD)  2                  Your next 10 appointments already scheduled     Feb 21, 2018  9:45 AM CST   Radiology Injections with Keyon Tamayo MD   [facility] ([facility])    [address]  [address]  [place] [address]   [phone]            Feb 21, 2018  9:45 AM CST   XR LUMBAR TRANSFORAMINAL INJ SINGLE with WYPAIN1   [facility] ([facility])    [address] [address] [phone]           For nerve root injection, please send or bring copies of any MRIs or other scans you have had.  Bring a list of your current medicines to your exam. (Include vitamins, minerals and over-the-counter medicines.) Leave your valuables at home.  Plan to have someone drive you home afterward.  Stop taking the following medicines (but talk to your doctor first):   If you take blood thinners, you may need to stop taking them a few days before treatment. Talk to your doctor before stopping these medicines.Stop taking Coumadin (warfarin) 3 days before treatment. Restart the day after treatment.   If you take Plavix, Ticlid, Pletal or Persantine, please ask your doctor if you should stop these medicines. You may need extra tests on the morning of your scan.   If you take Xarelto (Rivaroxaban), you may need to stop taking it 24 hours before treatment. Talk to your doctor before stopping this medicine. Restart the day after treatment.  You may take your other medicines as normal.  Stop all food and drink (including water) 3 hours before your test or treatment.  Please tell the doctor:   If you are allergic to X-ray dye (contrast fluid).   If you may be pregnant.  Injections take about 30 to 45 minutes. Most people spend up to 2 hours in the clinic or hospital.  Please call the Imaging Department at your exam site with any questions.            Feb 22, 2018 10:30 AM CST   Return Visit  with Keyon Blanco MD   [facility] ([facility]    [address] [address] [phone]              Further instructions from your care team       [facility]  Adult Discharge Orders & Instructions     For 24 hours after surgery    1. Get plenty of rest.  A responsible adult must stay with you for at least 24 hours after you leave the hospital.   2. Do not drive or use heavy equipment.  If you have weakness or tingling, don't drive or use heavy equipment until this feeling goes away.  3. Do not drink alcohol.  4. Avoid strenuous or risky activities.  Ask for help when climbing stairs.   5. You may feel lightheaded.  IF so, sit for a few minutes before standing.  Have someone help you get up.   6. If you have nausea (feel sick to your stomach): Drink only clear liquids such as apple juice, ginger ale, broth or 7-Up.  Rest may also help.  Be sure to drink enough fluids.  Move to a regular diet as you feel able.  7. You may have a slight fever. Call the doctor if your fever is over 100 F (37.7 C) (taken under the tongue) or lasts longer than 24 hours.  8. You may have a dry mouth, a sore throat, muscle aches or trouble sleeping.  These should go away after 24 hours.  9. Do not make important or legal decisions.   Call your doctor for any of the followin.  Signs of infection (fever, growing tenderness at the surgery site, a large amount of drainage or bleeding, severe pain, foul-smelling drainage, redness, swelling).    2. It has been over 8 to 10 hours since surgery and you are still not able to urinate (pass water.    To contact Dr. Blanco call [phone]    Pending Results     No orders found from 2018 to 2/15/2018.            Admission Information     Date & Time Provider Department Dept. Phone    2018 Keyon Blanco MD [facility] Phase [phone]      Your Vitals Were     Blood Pressure  "Temperature Respirations Height Weight Pulse Oximetry    130/75 98.6  F (37  C) (Oral) 16 1.676 m (5' 6\") 63.6 kg (140 lb 4.8 oz) 93%    BMI (Body Mass Index)                   22.65 kg/m2           [employer] Information     [employer] gives you secure access to your electronic health record. If you see a primary care provider, you can also send messages to your care team and make appointments. If you have questions, please call your primary care clinic.  If you do not have a primary care provider, please call [phone] and they will assist you.        Care EveryWhere ID     This is your Care EveryWhere ID. This could be used by other organizations to access your [facility] medical records  [id number]        Equal Access to Services     SHIVANI ARTIS : Amy Manzano, preethi nguyễn, nandini [place], wale pena. So [facility] [phone].    ATENCIÓN: Si habla español, tiene a ordoñez disposición servicios gratuitos de asistencia lingüística. [facility] [phone].    We comply with applicable federal civil rights laws and [place] laws. We do not discriminate on the basis of race, color, national origin, age, disability, sex, sexual orientation, or gender identity.               Review of your medicines      CONTINUE these medicines which may have CHANGED, or have new prescriptions. If we are uncertain of the size of tablets/capsules you have at home, strength may be listed as something that might have changed.        Dose / Directions    * oxyCODONE-acetaminophen 5-325 MG per tablet   Commonly known as:  PERCOCET   This may have changed:  Another medication with the same name was added. Make sure you understand how and when to take each.   Used for:  Post-op pain, S/P reverse total shoulder arthroplasty, right, S/P bilateral mastectomy, Trochanteric bursitis, unspecified laterality        Dose:  1 tablet   Take 1 tablet by mouth every 4 hours as needed for pain "   Quantity:  45 tablet   Refills:  0       * oxyCODONE-acetaminophen 5-325 MG per tablet   Commonly known as:  PERCOCET   This may have changed:  You were already taking a medication with the same name, and this prescription was added. Make sure you understand how and when to take each.   Used for:  Post-op pain        Dose:  1-2 tablet   Take 1-2 tablets by mouth every 4 hours as needed for pain (moderate to severe)   Quantity:  10 tablet   Refills:  0       * Notice:  This list has 2 medication(s) that are the same as other medications prescribed for you. Read the directions carefully, and ask your doctor or other care provider to review them with you.      CONTINUE these medicines which have NOT CHANGED        Dose / Directions    atorvastatin 20 MG tablet   Commonly known as:  LIPITOR   Used for:  Hyperlipidemia LDL goal <130        Dose:  20 mg   Take 1 tablet (20 mg) by mouth daily   Quantity:  90 tablet   Refills:  1       ciprofloxacin 500 MG tablet   Commonly known as:  CIPRO   Used for:  Acute cystitis without hematuria        Dose:  500 mg   Take 1 tablet (500 mg) by mouth 2 times daily   Quantity:  14 tablet   Refills:  0       FLUoxetine 40 MG capsule   Commonly known as:  PROzac   Used for:  Adjustment disorder with depressed mood        Dose:  40 mg   Take 1 capsule (40 mg) by mouth daily   Quantity:  30 capsule   Refills:  1       hydrochlorothiazide 25 MG tablet   Commonly known as:  HYDRODIURIL   Used for:  Essential hypertension        TAKE ONE TABLET BY MOUTH ONCE DAILY   Quantity:  90 tablet   Refills:  1       oxybutynin 5 MG tablet   Commonly known as:  DITROPAN   Used for:  Dysuria        TAKE ONE-HALF TABLET BY MOUTH TWICE A DAY   Quantity:  90 tablet   Refills:  0       oxyCODONE IR 5 MG tablet   Commonly known as:  ROXICODONE   Used for:  Chronic pain syndrome, Lumbar radiculopathy        Dose:  5 mg   Take 1 tablet (5 mg) by mouth every 6 hours as needed for pain   Quantity:  20 tablet    Refills:  0       prochlorperazine 10 MG tablet   Commonly known as:  COMPAZINE   Used for:  Encounter for antineoplastic chemotherapy        Dose:  10 mg   Take 1 tablet (10 mg) by mouth every 6 hours as needed (Nausea/Vomiting)   Quantity:  30 tablet   Refills:  3       TYLENOL EX ST ARTHRITIS PAIN 500 MG tablet   Used for:  Dermatitis due to sun   Generic drug:  acetaminophen        Dose:  500-1000 mg   Take 500-1,000 mg by mouth every 6 hours as needed for mild pain   Refills:  0       valACYclovir 1000 mg tablet   Commonly known as:  VALTREX   Used for:  Herpes zoster without complication        Dose:  1000 mg   Take 1 tablet (1,000 mg) by mouth 3 times daily   Quantity:  21 tablet   Refills:  0            Where to get your medicines      Some of these will need a paper prescription and others can be bought over the counter. Ask your nurse if you have questions.     Bring a paper prescription for each of these medications     oxyCODONE-acetaminophen 5-325 MG per tablet                Protect others around you: Learn how to safely use, store and throw away your medicines at www.disposemymeds.org.        Information about OPIOIDS     PRESCRIPTION OPIOIDS: WHAT YOU NEED TO KNOW    Prescription opioids can be used to help relieve moderate to severe pain and are often prescribed following a surgery or injury, or for certain health conditions. These medications can be an important part of treatment but also come with serious risks. It is important to work with your health care provider to make sure you are getting the safest, most effective care.    WHAT ARE THE RISKS AND SIDE EFFECTS OF OPIOID USE?  Prescription opioids carry serious risks of addiction and overdose, especially with prolonged use. An opioid overdose, often marked by slowed breathing can cause sudden death. The use of prescription opioids can have a number of side effects as well, even when taken as directed:      Tolerance - meaning you might need  to take more of a medication for the same pain relief    Physical dependence - meaning you have symptoms of withdrawal when a medication is stopped    Increased sensitivity to pain    Constipation    Nausea, vomiting, and dry mouth    Sleepiness and dizziness    Confusion    Depression    Low levels of testosterone that can result in lower sex drive, energy, and strength    Itching and sweating    RISKS ARE GREATER WITH:    History of drug misuse, substance use disorder, or overdose    Mental health conditions (such as depression or anxiety)    Sleep apnea    Older age (65 years or older)    Pregnancy    Avoid alcohol while taking prescription opioids.   Also, unless specifically advised by your health care provider, medications to avoid include:    Benzodiazepines (such as Xanax or Valium)    Muscle relaxants (such as Soma or Flexeril)    Hypnotics (such as Ambien or Lunesta)    Other prescription opioids    KNOW YOUR OPTIONS:  Talk to your health care provider about ways to manage your pain that do not involve prescription opioids. Some of these options may actually work better and have fewer risks and side effects:    Pain relievers such as acetaminophen, ibuprofen, and naproxen    Some medications that are also used for depression or seizures    Physical therapy and exercise    Cognitive behavioral therapy, a psychological, goal-directed approach, in which patients learn how to modify physical, behavioral, and emotional triggers of pain and stress    IF YOU ARE PRESCRIBED OPIOIDS FOR PAIN:    Never take opioids in greater amounts or more often than prescribed    Follow up with your primary health care provider and work together to create a plan on how to manage your pain.    Talk about ways to help manage your pain that do not involve prescription opioids    Talk about all concerns and side effects    Help prevent misuse and abuse    Never sell or share prescription opioids    Never use another person's  prescription opioids    Store prescription opioids in a secure place and out of reach of others (this may include visitors, children, friends, and family)    Visit www.cdc.gov/drugoverdose to learn about risks of opioid abuse and overdose    If you believe you may be struggling with addiction, tell your health care provider and ask for guidance or call [facility]'s National Helpline at 9-530-305-HELP    LEARN MORE / www.cdc.gov/drugoverdose/prescribing/guideline.html    Safely dispose of unused prescription opioids: Find your local drug take-back programs and more information about the importance of safe disposal at www.doseofreality.[place].gov             Medication List: This is a list of all your medications and when to take them. Check marks below indicate your daily home schedule. Keep this list as a reference.      Medications           Morning Afternoon Evening Bedtime As Needed    atorvastatin 20 MG tablet   Commonly known as:  LIPITOR   Take 1 tablet (20 mg) by mouth daily                                ciprofloxacin 500 MG tablet   Commonly known as:  CIPRO   Take 1 tablet (500 mg) by mouth 2 times daily                                FLUoxetine 40 MG capsule   Commonly known as:  PROzac   Take 1 capsule (40 mg) by mouth daily                                hydrochlorothiazide 25 MG tablet   Commonly known as:  HYDRODIURIL   TAKE ONE TABLET BY MOUTH ONCE DAILY                                oxybutynin 5 MG tablet   Commonly known as:  DITROPAN   TAKE ONE-HALF TABLET BY MOUTH TWICE A DAY                                oxyCODONE IR 5 MG tablet   Commonly known as:  ROXICODONE   Take 1 tablet (5 mg) by mouth every 6 hours as needed for pain                                * oxyCODONE-acetaminophen 5-325 MG per tablet   Commonly known as:  PERCOCET   Take 1 tablet by mouth every 4 hours as needed for pain                                * oxyCODONE-acetaminophen 5-325 MG per tablet   Commonly known as:  PERCOCET   Take  1-2 tablets by mouth every 4 hours as needed for pain (moderate to severe)                                prochlorperazine 10 MG tablet   Commonly known as:  COMPAZINE   Take 1 tablet (10 mg) by mouth every 6 hours as needed (Nausea/Vomiting)                                TYLENOL EX ST ARTHRITIS PAIN 500 MG tablet   Take 500-1,000 mg by mouth every 6 hours as needed for mild pain   Generic drug:  acetaminophen                                valACYclovir 1000 mg tablet   Commonly known as:  VALTREX   Take 1 tablet (1,000 mg) by mouth 3 times daily                                * Notice:  This list has 2 medication(s) that are the same as other medications prescribed for you. Read the directions carefully, and ask your doctor or other care provider to review them with you.

## 2018-02-15 ENCOUNTER — MYC MEDICAL ADVICE (OUTPATIENT)
Dept: FAMILY MEDICINE | Facility: OTHER | Age: 68
End: 2018-02-15

## 2018-02-16 ENCOUNTER — OFFICE VISIT (OUTPATIENT)
Dept: FAMILY MEDICINE | Facility: OTHER | Age: 68
End: 2018-02-16
Payer: COMMERCIAL

## 2018-02-16 VITALS
BODY MASS INDEX: 22.95 KG/M2 | DIASTOLIC BLOOD PRESSURE: 72 MMHG | TEMPERATURE: 98.7 F | WEIGHT: 142.2 LBS | HEART RATE: 68 BPM | SYSTOLIC BLOOD PRESSURE: 126 MMHG | RESPIRATION RATE: 18 BRPM

## 2018-02-16 DIAGNOSIS — R30.0 DYSURIA: ICD-10-CM

## 2018-02-16 DIAGNOSIS — R39.9 UTI SYMPTOMS: Primary | ICD-10-CM

## 2018-02-16 LAB
ALBUMIN UR-MCNC: NEGATIVE MG/DL
APPEARANCE UR: CLEAR
BILIRUB UR QL STRIP: NEGATIVE
COLOR UR AUTO: YELLOW
GLUCOSE UR STRIP-MCNC: NEGATIVE MG/DL
HGB UR QL STRIP: NEGATIVE
KETONES UR STRIP-MCNC: NEGATIVE MG/DL
LEUKOCYTE ESTERASE UR QL STRIP: NEGATIVE
NITRATE UR QL: NEGATIVE
PH UR STRIP: 7 PH (ref 5–7)
SOURCE: NORMAL
SP GR UR STRIP: 1.01 (ref 1–1.03)
SPECIMEN SOURCE: NORMAL
UROBILINOGEN UR STRIP-ACNC: 0.2 EU/DL (ref 0.2–1)
WET PREP SPEC: NORMAL

## 2018-02-16 PROCEDURE — 87210 SMEAR WET MOUNT SALINE/INK: CPT | Performed by: PHYSICIAN ASSISTANT

## 2018-02-16 PROCEDURE — 81003 URINALYSIS AUTO W/O SCOPE: CPT | Performed by: PHYSICIAN ASSISTANT

## 2018-02-16 PROCEDURE — 99213 OFFICE O/P EST LOW 20 MIN: CPT | Performed by: PHYSICIAN ASSISTANT

## 2018-02-16 RX ORDER — OXYBUTYNIN CHLORIDE 5 MG/1
TABLET ORAL
Qty: 90 TABLET | Refills: 1 | Status: SHIPPED | OUTPATIENT
Start: 2018-02-16 | End: 2019-01-11

## 2018-02-16 ASSESSMENT — PAIN SCALES - GENERAL: PAINLEVEL: NO PAIN (0)

## 2018-02-16 NOTE — PROGRESS NOTES
SUBJECTIVE:   Michaela Dorman is a 67 year old female who presents to clinic today for the following health issues:      HPI  URINARY TRACT SYMPTOMS  Onset: Ongoing    Description:   Painful urination (Dysuria): YES- Off an don  Blood in urine (Hematuria): no   Delay in urine (Hesitency): no     Intensity: moderate    Progression of Symptoms:  intermittent    Accompanying Signs & Symptoms:  Fever/chills: no   Flank pain no   Nausea and vomiting: no   Any vaginal symptoms: none  Abdominal/Pelvic Pain: no     History:   History of frequent UTI's: YES  History of kidney stones: no   Sexually Active: YES  Possibility of pregnancy: No    Precipitating factors:   none    Therapies Tried and outcome: Antibiotics   Problem list and histories reviewed & adjusted, as indicated.  Additional history: as documented    Patient Active Problem List   Diagnosis     Enthesopathy of hip region     Family history of stroke (cerebrovascular)     Trochanteric bursitis     Advanced directives, counseling/discussion     Health Care Home     Baker's cyst of knee     Patella-femoral syndrome     Adjustment disorder with depressed mood     Essential hypertension     Malignant neoplasm of upper-outer quadrant of left female breast (H)     Encounter for antineoplastic chemotherapy     S/P bilateral mastectomy     Anxiety     Hyperlipidemia LDL goal <130     S/P reverse total shoulder arthroplasty, right     Prophylactic antibiotic for dental procedure indicated due to prior joint replacement     Chronic pain syndrome     Lumbar radiculopathy     Foraminal stenosis of lumbar region     Central stenosis of spinal canal     DDD (degenerative disc disease), lumbar     Past Surgical History:   Procedure Laterality Date     ARTHROPLASTY SHOULDER Right 11/17/2015     ARTHROSCOPY KNEE  6/7/2012    Procedure:ARTHROSCOPY KNEE; right knee arthroscopy with partial lateral menisectomy; Surgeon:DAMIÁN MCALLISTER; Location:[facility]     C LIGATE FALLOPIAN  TUBE       C NONSPECIFIC PROCEDURE  1956    ankle fracture surgery     COLONOSCOPY N/A 12/22/2017    Procedure: COLONOSCOPY;  colonoscopy;  Surgeon: Chuck Chand MD;  Location: [facility]     EXCISE MASS AXILLA Left 9/16/2016    Procedure: EXCISE MASS AXILLA;  Surgeon: Keyon Blanco MD;  Location: [facility]     HC REMV CATARACT EXTRACAP,INSERT LENS  6/25/2009    Right eye     INSERT PORT VASCULAR ACCESS Right 10/7/2016    Procedure: INSERT PORT VASCULAR ACCESS;  Surgeon: Keyon Blanco MD;  Location[facility]     MASTECTOMY SIMPLE BILATERAL Bilateral 2/8/2017    Procedure: MASTECTOMY SIMPLE BILATERAL;  Surgeon: Keyon Blanco MD;  Location: [facility]     OPEN REDUCTION INTERNAL FIXATION FOOT Right 3/20/2015    Procedure: OPEN REDUCTION INTERNAL FIXATION FOOT;  Surgeon: Javi Herrmann DPM;  Location: [facility]     REMOVE PORT VASCULAR ACCESS Right 2/14/2018    Procedure: REMOVE PORT VASCULAR ACCESS;  right intra jugular port removal;  Surgeon: Keyon Blanco MD;  Location: [facility]     SHOULDER SURGERY Right 11/2015       Social History   Substance Use Topics     Smoking status: Former Smoker     Packs/day: 3.00     Years: 10.00     Types: Cigarettes     Quit date: 12/1/1979     Smokeless tobacco: Never Used      Comment: approx. 3 packs daily     Alcohol use 0.0 oz/week     0 Standard drinks or equivalent per week      Comment: daily glass of wine     Family History   Problem Relation Age of Onset     Hypertension Mother      Thyroid Disease Mother      CEREBROVASCULAR DISEASE Mother      Hypertension Father      CEREBROVASCULAR DISEASE Father      CANCER Father      skin     Thyroid Disease Sister      DIABETES Brother      type 2     Depression Sister      Breast Cancer Sister      age 47     CANCER Sister      skin     CANCER Brother      inside nose         Current Outpatient Prescriptions   Medication Sig Dispense Refill     oxybutynin (DITROPAN) 5 MG tablet TAKE ONE-HALF TABLET BY MOUTH TWICE A DAY 90 tablet 1      oxyCODONE-acetaminophen (PERCOCET) 5-325 MG per tablet Take 1-2 tablets by mouth every 4 hours as needed for pain (moderate to severe) 10 tablet 0     prochlorperazine (COMPAZINE) 10 MG tablet Take 1 tablet (10 mg) by mouth every 6 hours as needed (Nausea/Vomiting) 30 tablet 3     FLUoxetine (PROZAC) 40 MG capsule Take 1 capsule (40 mg) by mouth daily 30 capsule 1     oxyCODONE-acetaminophen (PERCOCET) 5-325 MG per tablet Take 1 tablet by mouth every 4 hours as needed for pain 45 tablet 0     hydrochlorothiazide (HYDRODIURIL) 25 MG tablet TAKE ONE TABLET BY MOUTH ONCE DAILY 90 tablet 1     oxyCODONE IR (ROXICODONE) 5 MG tablet Take 1 tablet (5 mg) by mouth every 6 hours as needed for pain 20 tablet 0     atorvastatin (LIPITOR) 20 MG tablet Take 1 tablet (20 mg) by mouth daily 90 tablet 1     acetaminophen (TYLENOL EX ST ARTHRITIS PAIN) 500 MG tablet Take 500-1,000 mg by mouth every 6 hours as needed for mild pain       Allergies   Allergen Reactions     No Known Drug Allergies      Recent Labs   Lab Test  02/12/18   0917  01/16/18   1405  10/24/17   1025  09/18/17   0840   01/24/17   1016   08/31/16   1442   10/06/11   0754   08/02/10   0832   A1C   --    --    --    --    --   5.0   --    --    --    --    --    --    LDL   --    --    --   170*   --    --    --   150*   --   149*   --   120   HDL   --    --    --   67   --    --    --    --    --    --    --   72   TRIG   --    --    --   178*   --    --    --    --    --    --    --   125   ALT  22  26  33  25   < >  49   < >   --    --   19   < >   --    CR  0.87  1.01  0.97  0.98   < >  0.92   < >   --    < >  0.95   < >   --    GFRESTIMATED  64  54*  57*  57*   < >  61   < >   --    < >  60*   < >   --    GFRESTBLACK  78  66  69  69   < >  73   < >   --    < >  72   < >   --    POTASSIUM  3.9  3.6  3.9  4.2   < >  3.9   < >   --    < >  4.0   < >   --    TSH   --    --    --    --    --   1.34   --   0.39*   < >   --    --   0.81    < > = values in this  interval not displayed.      BP Readings from Last 3 Encounters:   02/16/18 126/72   02/14/18 136/78   02/06/18 124/66    Wt Readings from Last 3 Encounters:   02/16/18 142 lb 3.2 oz (64.5 kg)   02/14/18 140 lb 4.8 oz (63.6 kg)   02/06/18 140 lb 4.8 oz (63.6 kg)                  Labs reviewed in EPIC    ROS:  Constitutional, HEENT, cardiovascular, pulmonary, gi and gu systems are negative, except as otherwise noted.    OBJECTIVE:     /72 (Cuff Size: Adult Regular)  Pulse 68  Temp 98.7  F (37.1  C) (Temporal)  Resp 18  Wt 142 lb 3.2 oz (64.5 kg)  BMI 22.95 kg/m2  Body mass index is 22.95 kg/(m^2).  GENERAL: healthy, alert and no distress  RESP: lungs clear to auscultation - no rales, rhonchi or wheezes  CV: regular rate and rhythm, normal S1 S2, no S3 or S4, no murmur, click or rub, no peripheral edema and peripheral pulses strong  ABDOMEN: soft, nontender, no hepatosplenomegaly, no masses and bowel sounds normal  MS: no gross musculoskeletal defects noted, no edema  PSYCH: mentation appears normal, affect normal/bright    Diagnostic Test Results:  Results for orders placed or performed in visit on 02/16/18 (from the past 24 hour(s))   Wet prep   Result Value Ref Range    Specimen Description Vagina     Wet Prep No Trichomonas seen     Wet Prep No clue cells seen     Wet Prep No yeast seen    *UA reflex to Microscopic and Culture ([place] [facility] (except [place] and [place])   Result Value Ref Range    Color Urine Yellow     Appearance Urine Clear     Glucose Urine Negative NEG^Negative mg/dL    Bilirubin Urine Negative NEG^Negative    Ketones Urine Negative NEG^Negative mg/dL    Specific Gravity Urine 1.010 1.003 - 1.035    Blood Urine Negative NEG^Negative    pH Urine 7.0 5.0 - 7.0 pH    Protein Albumin Urine Negative NEG^Negative mg/dL    Urobilinogen Urine 0.2 0.2 - 1.0 EU/dL    Nitrite Urine Negative NEG^Negative    Leukocyte Esterase Urine Negative NEG^Negative    Source Midstream  Urine        ASSESSMENT/PLAN:     1. UTI symptoms  2. Dysuria  Excellent results are noted with respect to both wet prep and urinary tract infection status today.  I do advise her though that due to the frequency of urinary tract infections that she has been struggling with over the past year that if she does have an another urinary tract infection she will need a vaginal exam to evaluate the pelvic floor to the best of our ability and a CT scan of the abdomen to assure us that there is nothing from malignant standpoint to the pelvis.  We do review with her her PET scan from back in March 2017 which was very clear at that point in time.  This is reassuring and we discussed at length.  - oxybutynin (DITROPAN) 5 MG tablet; TAKE ONE-HALF TABLET BY MOUTH TWICE A DAY  Dispense: 90 tablet; Refill: 1    Phani Jason PA-C  [facility]

## 2018-02-16 NOTE — NURSING NOTE
"Chief Complaint   Patient presents with     UTI     Panel Management       Initial /72 (Cuff Size: Adult Regular)  Pulse 68  Temp 98.7  F (37.1  C) (Temporal)  Resp 18  Wt 142 lb 3.2 oz (64.5 kg)  BMI 22.95 kg/m2 Estimated body mass index is 22.95 kg/(m^2) as calculated from the following:    Height as of 2/14/18: 5' 6\" (1.676 m).    Weight as of this encounter: 142 lb 3.2 oz (64.5 kg).  Medication Reconciliation: complete  "

## 2018-02-16 NOTE — MR AVS SNAPSHOT
After Visit Summary   2/16/2018    Michaela Dorman    MRN: [id number]           Patient Information     Date Of Birth          1950        Visit Information        Provider Department      2/16/2018 10:40 AM Phani Tapia PA-C [facility]        Today's Diagnoses     UTI symptoms    -  1    Dysuria           Follow-ups after your visit        Your next 10 appointments already scheduled     Feb 19, 2018  3:40 PM CST   Office Visit with Phani Tapia PA-C   [facility] ([facility]    [address]  [facility] [id number] [phone]           Bring a current list of meds and any records pertaining to this visit. For Physicals, please bring immunization records and any forms needing to be filled out. Please arrive 10 minutes early to complete paperwork.            Feb 21, 2018  9:45 AM CST   Radiology Injections with Keyon Tamayo MD   [facility] ([facility])    [address]  [address]  [facility] [id number] [phone]            Feb 21, 2018  9:45 AM CST   XR LUMBAR TRANSFORAMINAL INJ SINGLE with FERNANDO   [facility] ([facility])    [address] [address] [phone]           For nerve root injection, please send or bring copies of any MRIs or other scans you have had.  Bring a list of your current medicines to your exam. (Include vitamins, minerals and over-the-counter medicines.) Leave your valuables at home.  Plan to have someone drive you home afterward.  Stop taking the following medicines (but talk to your doctor first):   If you take blood thinners, you may need to stop taking them a few days before treatment. Talk to your doctor before stopping these medicines.Stop taking Coumadin (warfarin) 3 days before treatment. Restart the day after treatment.   If you take Plavix, Ticlid, Pletal or  Persantine, please ask your doctor if you should stop these medicines. You may need extra tests on the morning of your scan.   If you take Xarelto (Rivaroxaban), you may need to stop taking it 24 hours before treatment. Talk to your doctor before stopping this medicine. Restart the day after treatment.  You may take your other medicines as normal.  Stop all food and drink (including water) 3 hours before your test or treatment.  Please tell the doctor:   If you are allergic to X-ray dye (contrast fluid).   If you may be pregnant.  Injections take about 30 to 45 minutes. Most people spend up to 2 hours in the clinic or hospital.  Please call the Imaging Department at your exam site with any questions.            Feb 22, 2018 10:30 AM CST   Return Visit with Keyon Blanco MD   [facility]    [address] [place] [address] [phone]              Who to contact     If you have questions or need follow up information about today's clinic visit or your schedule please contact [facility] directly at [phone].  Normal or non-critical lab and imaging results will be communicated to you by [employer], letter or phone within 4 business days after the clinic has received the results. If you do not hear from us within 7 days, please contact the clinic through [email or web address] or phone. If you have a critical or abnormal lab result, we will notify you by phone as soon as possible.  Submit refill requests through [employer] or call your pharmacy and they will forward the refill request to us. Please allow 3 business days for your refill to be completed.          Additional Information About Your Visit        [employer] Information     [employer] gives you secure access to your electronic health record. If you see a primary care provider, you can also send messages to your care team and make appointments. If you have questions, please call your primary care clinic.   If you do not have a primary care provider, please call [phone] and they will assist you.        Care EveryWhere ID     This is your Care EveryWhere ID. This could be used by other organizations to access your [facility] medical records  [id number]        Your Vitals Were     Pulse Temperature Respirations BMI (Body Mass Index)          68 98.7  F (37.1  C) (Temporal) 18 22.95 kg/m2         Blood Pressure from Last 3 Encounters:   02/16/18 126/72   02/14/18 136/78   02/06/18 124/66    Weight from Last 3 Encounters:   02/16/18 142 lb 3.2 oz (64.5 kg)   02/14/18 140 lb 4.8 oz (63.6 kg)   02/06/18 140 lb 4.8 oz (63.6 kg)              We Performed the Following     *UA reflex to Microscopic and Culture ([place] and [facility] (except [place] and [place])     Wet prep          Today's Medication Changes          These changes are accurate as of 2/16/18 11:35 AM.  If you have any questions, ask your nurse or doctor.               These medicines have changed or have updated prescriptions.        Dose/Directions    oxybutynin 5 MG tablet   Commonly known as:  DITROPAN   This may have changed:  See the new instructions.   Used for:  Dysuria   Changed by:  Phani aTpia PA-C        TAKE ONE-HALF TABLET BY MOUTH TWICE A DAY   Quantity:  90 tablet   Refills:  1            Where to get your medicines      These medications were sent to [facility]  [address]   [address] [place] [address]     Phone:  [phone]     oxybutynin 5 MG tablet                Primary Care Provider Office Phone # Fax #    Phani Tapia PA-C [phone]       [facility] [address]  [facility] [address]        Equal Access to Services     SHIVANI ARTIS AH: Amy whitfieldo Sojose, waaxda luqadaha, qaybta kaalmada adeegyada, wale pena. So [facility] [phone].    ATENCIÓN: Si habla español, tiene a ordoñez disposición servicios  cortney de asistencia lingüística. Shaneka brandt [phone].    We comply with applicable federal civil rights laws and [place] laws. We do not discriminate on the basis of race, color, national origin, age, disability, sex, sexual orientation, or gender identity.            Thank you!     Thank you for choosing [facility]  for your care. Our goal is always to provide you with excellent care. Hearing back from our patients is one way we can continue to improve our services. Please take a few minutes to complete the written survey that you may receive in the mail after your visit with us. Thank you!             Your Updated Medication List - Protect others around you: Learn how to safely use, store and throw away your medicines at www.disposemymeds.org.          This list is accurate as of 2/16/18 11:35 AM.  Always use your most recent med list.                   Brand Name Dispense Instructions for use Diagnosis    atorvastatin 20 MG tablet    LIPITOR    90 tablet    Take 1 tablet (20 mg) by mouth daily    Hyperlipidemia LDL goal <130       FLUoxetine 40 MG capsule    PROzac    30 capsule    Take 1 capsule (40 mg) by mouth daily    Adjustment disorder with depressed mood       hydrochlorothiazide 25 MG tablet    HYDRODIURIL    90 tablet    TAKE ONE TABLET BY MOUTH ONCE DAILY    Essential hypertension       oxybutynin 5 MG tablet    DITROPAN    90 tablet    TAKE ONE-HALF TABLET BY MOUTH TWICE A DAY    Dysuria       oxyCODONE IR 5 MG tablet    ROXICODONE    20 tablet    Take 1 tablet (5 mg) by mouth every 6 hours as needed for pain    Chronic pain syndrome, Lumbar radiculopathy       * oxyCODONE-acetaminophen 5-325 MG per tablet    PERCOCET    45 tablet    Take 1 tablet by mouth every 4 hours as needed for pain    Post-op pain, S/P reverse total shoulder arthroplasty, right, S/P bilateral mastectomy, Trochanteric bursitis, unspecified laterality       * oxyCODONE-acetaminophen 5-325 MG per tablet     PERCOCET    10 tablet    Take 1-2 tablets by mouth every 4 hours as needed for pain (moderate to severe)    Post-op pain       prochlorperazine 10 MG tablet    COMPAZINE    30 tablet    Take 1 tablet (10 mg) by mouth every 6 hours as needed (Nausea/Vomiting)    Encounter for antineoplastic chemotherapy       TYLENOL EX ST ARTHRITIS PAIN 500 MG tablet   Generic drug:  acetaminophen      Take 500-1,000 mg by mouth every 6 hours as needed for mild pain    Dermatitis due to sun       * Notice:  This list has 2 medication(s) that are the same as other medications prescribed for you. Read the directions carefully, and ask your doctor or other care provider to review them with you.

## 2018-02-21 ENCOUNTER — RADIOLOGY INJECTION OFFICE VISIT (OUTPATIENT)
Dept: PALLIATIVE MEDICINE | Facility: CLINIC | Age: 68
End: 2018-02-21
Payer: COMMERCIAL

## 2018-02-21 ENCOUNTER — HOSPITAL ENCOUNTER (OUTPATIENT)
Dept: RADIOLOGY | Facility: CLINIC | Age: 68
Discharge: HOME | End: 2018-02-21
Attending: ANESTHESIOLOGY | Admitting: ANESTHESIOLOGY
Payer: MEDICARE

## 2018-02-21 VITALS — RESPIRATION RATE: 16 BRPM | SYSTOLIC BLOOD PRESSURE: 142 MMHG | HEART RATE: 68 BPM | DIASTOLIC BLOOD PRESSURE: 77 MMHG

## 2018-02-21 DIAGNOSIS — M47.26 OTHER SPONDYLOSIS WITH RADICULOPATHY, LUMBAR REGION: ICD-10-CM

## 2018-02-21 DIAGNOSIS — M99.73 CONNECTIVE TISSUE AND DISC STENOSIS OF INTERVERTEBRAL FORAMINA OF LUMBAR REGION: ICD-10-CM

## 2018-02-21 DIAGNOSIS — M54.16 LUMBAR RADICULOPATHY: Primary | ICD-10-CM

## 2018-02-21 DIAGNOSIS — M51.369 DDD (DEGENERATIVE DISC DISEASE), LUMBAR: ICD-10-CM

## 2018-02-21 PROCEDURE — 25000128 H RX IP 250 OP 636: Performed by: ANESTHESIOLOGY

## 2018-02-21 PROCEDURE — 64483 NJX AA&/STRD TFRM EPI L/S 1: CPT

## 2018-02-21 PROCEDURE — 25000125 ZZHC RX 250: Performed by: ANESTHESIOLOGY

## 2018-02-21 PROCEDURE — 64483 NJX AA&/STRD TFRM EPI L/S 1: CPT | Mod: LT | Performed by: ANESTHESIOLOGY

## 2018-02-21 RX ORDER — METHYLPREDNISOLONE ACETATE 40 MG/ML
40 INJECTION, SUSPENSION INTRA-ARTICULAR; INTRALESIONAL; INTRAMUSCULAR; SOFT TISSUE ONCE
Status: COMPLETED | OUTPATIENT
Start: 2018-02-21 | End: 2018-02-21

## 2018-02-21 RX ORDER — BUPIVACAINE HYDROCHLORIDE 5 MG/ML
10 INJECTION, SOLUTION PERINEURAL ONCE
Status: COMPLETED | OUTPATIENT
Start: 2018-02-21 | End: 2018-02-21

## 2018-02-21 RX ORDER — IOPAMIDOL 612 MG/ML
3 INJECTION, SOLUTION INTRATHECAL ONCE
Status: COMPLETED | OUTPATIENT
Start: 2018-02-21 | End: 2018-02-21

## 2018-02-21 RX ORDER — DEXAMETHASONE SODIUM PHOSPHATE 10 MG/ML
10 INJECTION, SOLUTION INTRAMUSCULAR; INTRAVENOUS ONCE
Status: COMPLETED | OUTPATIENT
Start: 2018-02-21 | End: 2018-02-21

## 2018-02-21 RX ADMIN — BUPIVACAINE HYDROCHLORIDE 1 ML: 5 INJECTION, SOLUTION EPIDURAL; INTRACAUDAL at 09:50

## 2018-02-21 RX ADMIN — METHYLPREDNISOLONE ACETATE 40 MG: 40 INJECTION, SUSPENSION INTRA-ARTICULAR; INTRALESIONAL; INTRAMUSCULAR; SOFT TISSUE at 09:51

## 2018-02-21 RX ADMIN — IOPAMIDOL 1 ML: 612 INJECTION, SOLUTION INTRATHECAL at 09:50

## 2018-02-21 RX ADMIN — DEXAMETHASONE SODIUM PHOSPHATE 5 MG: 10 INJECTION, SOLUTION INTRAMUSCULAR; INTRAVENOUS at 09:50

## 2018-02-21 NOTE — IP AVS SNAPSHOT
MRN:[id number]                      After Visit Summary   2/21/2018    Michaela Dorman    MRN: [id number]           Visit Information        Provider Department      2/21/2018  9:45 AM FERNANDO [facility]           Review of your medicines      UNREVIEWED medicines. Ask your doctor about these medicines        Dose / Directions    atorvastatin 20 MG tablet   Commonly known as:  LIPITOR   Used for:  Hyperlipidemia LDL goal <130        Dose:  20 mg   Take 1 tablet (20 mg) by mouth daily   Quantity:  90 tablet   Refills:  1       FLUoxetine 40 MG capsule   Commonly known as:  PROzac   Used for:  Adjustment disorder with depressed mood        Dose:  40 mg   Take 1 capsule (40 mg) by mouth daily   Quantity:  30 capsule   Refills:  1       hydrochlorothiazide 25 MG tablet   Commonly known as:  HYDRODIURIL   Used for:  Essential hypertension        TAKE ONE TABLET BY MOUTH ONCE DAILY   Quantity:  90 tablet   Refills:  1       oxybutynin 5 MG tablet   Commonly known as:  DITROPAN   Used for:  Dysuria        TAKE ONE-HALF TABLET BY MOUTH TWICE A DAY   Quantity:  90 tablet   Refills:  1       oxyCODONE IR 5 MG tablet   Commonly known as:  ROXICODONE   Used for:  Chronic pain syndrome, Lumbar radiculopathy        Dose:  5 mg   Take 1 tablet (5 mg) by mouth every 6 hours as needed for pain   Quantity:  20 tablet   Refills:  0       * oxyCODONE-acetaminophen 5-325 MG per tablet   Commonly known as:  PERCOCET   Used for:  Post-op pain, S/P reverse total shoulder arthroplasty, right, S/P bilateral mastectomy, Trochanteric bursitis, unspecified laterality        Dose:  1 tablet   Take 1 tablet by mouth every 4 hours as needed for pain   Quantity:  45 tablet   Refills:  0       * oxyCODONE-acetaminophen 5-325 MG per tablet   Commonly known as:  PERCOCET   Used for:  Post-op pain        Dose:  1-2 tablet   Take 1-2 tablets by mouth every 4 hours as needed for pain (moderate to severe)   Quantity:   10 tablet   Refills:  0       prochlorperazine 10 MG tablet   Commonly known as:  COMPAZINE   Used for:  Encounter for antineoplastic chemotherapy        Dose:  10 mg   Take 1 tablet (10 mg) by mouth every 6 hours as needed (Nausea/Vomiting)   Quantity:  30 tablet   Refills:  3       TYLENOL EX ST ARTHRITIS PAIN 500 MG tablet   Used for:  Dermatitis due to sun   Generic drug:  acetaminophen        Dose:  500-1000 mg   Take 500-1,000 mg by mouth every 6 hours as needed for mild pain   Refills:  0       * Notice:  This list has 2 medication(s) that are the same as other medications prescribed for you. Read the directions carefully, and ask your doctor or other care provider to review them with you.             Protect others around you: Learn how to safely use, store and throw away your medicines at [email or web address].         Follow-ups after your visit         Care Instructions        Further instructions from your care team       [facility]   Procedure Discharge Instructions    Today you saw:    Dr. Keyon Tamayo      You had an:  Lumbar Transforaminal Epidural steroid injection        Medications used:  Lidocaine   Bupivacaine  Decadron Depo-medrol Iso Chelita            Be cautious when walking. Numbness and/or weakness in the lower extremities may occur for up to 6-8 hours after the procedure due to effect of the local anesthetic    Do not drive for 6 hours. The effect of the local anesthetic could slow your reflexes.     You may resume your regular activities after 24 hours    Avoid strenuous activity for the first 24 hours    You may shower, however avoid swimming, tub baths or hot tubs for 24 hours following your procedure    You may have a mild to moderate increase in pain for several days following the injection.    It may take up to 14 days for the steroid medication to start working although you may feel the effect as early as a few days after the procedure.       You may use ice  packs for 10-15 minutes, 3 to 4 times a day at the injection site for comfort    Do not use heat to painful areas for 6 to 8 hours. This will give the local anesthetic time to wear off and prevent you from accidentally burning your skin.     You may use anti-inflammatory medications (such as Ibuprofen or Aleve or Advil) or Tylenol for pain control if necessary    If you experience any of the following, call the pain center nursing line during work hours at [phone] or the after hours provider line at [phone]:  -Fever over 100 degree F  -Swelling, bleeding, redness, drainage, warmth at the injection site  -Progressive weakness or numbness in your legs  -Loss of bowel or bladder function  -Unusual new onset of pain that is not improving      Phone #s:  Appointment line: [phone];  Nurse line: [phone]           Additional Information About Your Visit        MyChart Information     [employer] gives you secure access to your electronic health record. If you see a primary care provider, you can also send messages to your care team and make appointments. If you have questions, please call your primary care clinic.  If you do not have a primary care provider, please call [phone] and they will assist you.        Care EveryWhere ID     This is your Care EveryWhere ID. This could be used by other organizations to access your [place] medical records  [id number]        Your Vitals Were     Blood Pressure Pulse Respirations             138/80 69 14          Primary Care Provider Office Phone # Fax #    Phani Tapia PA-C [phone]      Equal Access to Services     [facility] : Hadii santa ku hadasho Soelisabethali, waaxda luqadaha, qaybta kaalmada adeegyada, wale pena. So [facility] [phone].    ATENCIÓN: Si habla español, tiene a ordoñez disposición servicios gratuitos de asistencia lingüística. Llame al [phone].    We comply with applicable federal civil rights laws  and [place] laws. We do not discriminate on the basis of race, color, national origin, age, disability, sex, sexual orientation, or gender identity.            Thank you!     Thank you for choosing [facility] for your care. Our goal is always to provide you with excellent care. Hearing back from our patients is one way we can continue to improve our services. Please take a few minutes to complete the written survey that you may receive in the mail after you visit with us. Thank you!             Medication List: This is a list of all your medications and when to take them. Check marks below indicate your daily home schedule. Keep this list as a reference.      Medications           Morning Afternoon Evening Bedtime As Needed    atorvastatin 20 MG tablet   Commonly known as:  LIPITOR   Take 1 tablet (20 mg) by mouth daily                                FLUoxetine 40 MG capsule   Commonly known as:  PROzac   Take 1 capsule (40 mg) by mouth daily                                hydrochlorothiazide 25 MG tablet   Commonly known as:  HYDRODIURIL   TAKE ONE TABLET BY MOUTH ONCE DAILY                                oxybutynin 5 MG tablet   Commonly known as:  DITROPAN   TAKE ONE-HALF TABLET BY MOUTH TWICE A DAY                                oxyCODONE IR 5 MG tablet   Commonly known as:  ROXICODONE   Take 1 tablet (5 mg) by mouth every 6 hours as needed for pain                                * oxyCODONE-acetaminophen 5-325 MG per tablet   Commonly known as:  PERCOCET   Take 1 tablet by mouth every 4 hours as needed for pain                                * oxyCODONE-acetaminophen 5-325 MG per tablet   Commonly known as:  PERCOCET   Take 1-2 tablets by mouth every 4 hours as needed for pain (moderate to severe)                                prochlorperazine 10 MG tablet   Commonly known as:  COMPAZINE   Take 1 tablet (10 mg) by mouth every 6 hours as needed (Nausea/Vomiting)                                TYLENOL EX ST  ARTHRITIS PAIN 500 MG tablet   Take 500-1,000 mg by mouth every 6 hours as needed for mild pain   Generic drug:  acetaminophen                                * Notice:  This list has 2 medication(s) that are the same as other medications prescribed for you. Read the directions carefully, and ask your doctor or other care provider to review them with you.

## 2018-02-21 NOTE — PROGRESS NOTES
Pre-procedure Intake    Have you been fasting? NA    If yes, for how long?     Are you taking a prescribed blood thinner such as coumadin, Plavix, Xarelto?    No    If yes, when did you take your last dose?     Do you take aspirin?  Yes -   ASA    If cervical procedure, have you held aspirin for 6 days? n/a    Do you have any allergies to contrast dye, iodine, steroid and/or numbing medications?  NO    Are you currently taking antibiotics or have an active infection?  NO    Have you had a fever/elevated temperature within the past week? Not Applicable    Are you currently taking oral steroids? NO    Do you have a ? Yes       Are you pregnant or breastfeeding?  NO    Are the vital signs normal?  Yes

## 2018-02-21 NOTE — IP AVS SNAPSHOT
[facility]    [address]    [facility] [address]    Phone:  [phone]    Fax:  [phone]                                       After Visit Summary   2/21/2018    Michaela Dorman    MRN: [id number]           After Visit Summary Signature Page     I have received my discharge instructions, and my questions have been answered. I have discussed any challenges I see with this plan with the nurse or doctor.    ..........................................................................................................................................  Patient/Patient Representative Signature      ..........................................................................................................................................  Patient Representative Print Name and Relationship to Patient    ..................................................               ................................................  Date                                            Time    ..........................................................................................................................................  Reviewed by Signature/Title    ...................................................              ..............................................  Date                                                            Time

## 2018-02-21 NOTE — DISCHARGE INSTRUCTIONS
[facility]   Procedure Discharge Instructions    Today you saw:    Dr. Keyon Tamayo      You had an:  Lumbar Transforaminal Epidural steroid injection        Medications used:  Lidocaine   Bupivacaine  Decadron Depo-medrol Iso Chelita            Be cautious when walking. Numbness and/or weakness in the lower extremities may occur for up to 6-8 hours after the procedure due to effect of the local anesthetic    Do not drive for 6 hours. The effect of the local anesthetic could slow your reflexes.     You may resume your regular activities after 24 hours    Avoid strenuous activity for the first 24 hours    You may shower, however avoid swimming, tub baths or hot tubs for 24 hours following your procedure    You may have a mild to moderate increase in pain for several days following the injection.    It may take up to 14 days for the steroid medication to start working although you may feel the effect as early as a few days after the procedure.       You may use ice packs for 10-15 minutes, 3 to 4 times a day at the injection site for comfort    Do not use heat to painful areas for 6 to 8 hours. This will give the local anesthetic time to wear off and prevent you from accidentally burning your skin.     You may use anti-inflammatory medications (such as Ibuprofen or Aleve or Advil) or Tylenol for pain control if necessary    If you experience any of the following, call the pain center nursing line during work hours at [phone] or the after hours provider line at [phone]:  -Fever over 100 degree F  -Swelling, bleeding, redness, drainage, warmth at the injection site  -Progressive weakness or numbness in your legs  -Loss of bowel or bladder function  -Unusual new onset of pain that is not improving      Phone #s:  Appointment line: [phone];  Nurse line: [phone]

## 2018-02-21 NOTE — PROGRESS NOTES
Pre procedure Diagnosis: lumbar radiculopathy, lumbar degenerative disc disease   Post procedure Diagnosis: Same  Procedure performed: lumbar transforaminal epidural steroid injection at L5-S1 on the left, fluoroscopically guided, contrast controlled  Anesthesia: none  Complications: none  Operators: Keyon Tamayo MD     Indications:   Michaela Dorman is a 67 year old female was sent by Dr. Lenny Gomes for lumbar epidural steroid injection.  They have a history of chronic lower back pain with pain radiating down the left lower extremity to the lateral ankle.  Exam shows mildly antalgic gait, lumbar tenderness, and positive straight leg raise and they have tried conservative treatment including activity modifications, medications.    MRI was done on 11/30/17 which showed   FINDINGS: Plain films dated 11/30/2017 confirm 5 functional lumbar  vertebral segments. The caudal thecal sac contents appear  intrinsically normal. Straightening of the normal lumbar lordosis is  again seen. There is mild degenerative retrolisthesis L4 on L5 without  change. There is also a subtle degenerative anterolisthesis of L3 on  L4 which is unchanged. Mild curvature lower lumbar spine convex to the  right. 8 mm lateral listhesis L2 on L3 to the left and mild lateral  listhesis L4 on L5 to the right. Benign peridiscal degenerative signal  change at multiple levels. Schmorl's node involving inferior endplate  of L4. No acute bony abnormality.     T12-L1: Normal.     L1-L2: Interval development of mild disc space narrowing and subtle  disc bulging with no disc protrusion or central stenosis. New minimal  left foraminal narrowing from the bulging disc. Right foramen is  unremarkable. Posterior facets are normal.     L2-L3: Interval development of advanced degenerative disc space  narrowing and diffuse disc bulging. This combines with a congenitally  small bony canal, thickening of the ligament flavum and dorsal  epidural fat to cause new  mild overall central stenosis. There is also  new moderate right and mild left foraminal stenosis. Posterior facets  are unremarkable.     L3-L4: Signal loss, mild disc space narrowing and mild diffuse disc  bulging without acute disc protrusion. Moderate posterior facet  degenerative changes with minimal anterolisthesis. These factors  combine with thickening of the ligament flavum and dorsal epidural fat  to cause mild central stenosis. There is right posterolateral annular  fissuring. Mild to moderate right foraminal stenosis from focal disc  bulging or broad-based foraminal disc protrusion. Minimal left  foraminal narrowing. No change.     L4-L5: Signal loss, advanced left-sided degenerative disc space  narrowing and diffuse disc bulging with superimposed small broad-based  right central disc protrusion mildly indenting the anterior thecal sac  and causing bilateral lateral recess stenosis, right greater than  left. This combines with thickening of the ligamentum flavum to cause  borderline-mild central stenosis. Mild right and moderate left  foraminal stenosis. No change.     L5-S1: Signal loss and advanced degenerative disc space narrowing with  chronic diffuse disc bulging and endplate spurring and no acute disc  protrusion. The degenerative disc disease has significantly progressed  since the prior exam and there is now right S1 lateral recess stenosis  (image 33 of series 6). No central stenosis. Severe right foraminal  stenosis has increased. Moderate left foraminal stenosis has also  increased. Mild posterior facet degenerative changes bilaterally.     Paraspinal soft tissues are unremarkable.         IMPRESSION:   1. Interval development of degenerative disc disease at L1-L2 with  minimal left foraminal stenosis.  2. Interval development of advanced degenerative disc disease at L2-L3  with mild central stenosis, moderate right foraminal stenosis and mild  left foraminal stenosis.  3. Mild central  stenosis at L3-L4 related to multiple factors. Mild to  moderate right and minimal left foraminal stenosis at this level and  no change since the prior exam.  4. Borderline-mild central stenosis at L4-L5 related multiple factors.  Mild right and moderate left foraminal stenosis at this level and no  change since the prior exam.  5. Progressive advanced degenerative disc disease L5-S1 with right S1  lateral recess stenosis and increasing severe right and moderate left  foraminal stenosis.    Options/alternatives, benefits and risks were discussed with the patient including bleeding, infection, tissue trauma, numbness, weakness, paralysis, spinal cord injury, radiation exposure, headache and reaction to medications. Questions were answered to her satisfaction and she agrees to proceed. Voluntary informed consent was obtained and signed.     Vitals were reviewed: Yes  138/80  69 14  Allergies were reviewed:  Yes   Medications were reviewed:  Yes   Pre-procedure pain score: 3/10    Procedure:  After getting informed consent, patient was brought into the procedure suite and was placed in a prone position on the procedure table.   A Pause for the Cause was performed.  Patient was prepped and draped in sterile fashion.     After identifying the left L5-S1 neuroforamen, the C-arm was rotated to a left lateral oblique angle.  A total of 1 ml of Lidocaine 1% was used to anesthetize the skin and the needle track at a skin entry site coaxial with the fluoroscopy beam, and overriding the superior aspect of the neuroforamen.  A 27 gauge 3.5 inch spinal needle was advanced under intermittent fluoroscopy until it entered the foramen superiorly.    The needle position was then inspected from anteroposterior and lateral views, and the needle adjusted appropriately.  Aspiration was negative.  A total of 1 ml of Isovue-300 was injected, confirming appropriate needle position, with spread into the nerve root sheath and the epidural  space, with no intravascular uptake. 2 ml was wasted    Then, after repeated negative aspiration, 2.5 ml of a combination of Depomedrol 40 mg, Decadron 5 mg, 0.5% bupivacaine 1 ml was injected onto the nerve root and the needle was flushed with lidocaine and removed.    During the procedure, there was not a paresthesia.  Hemostasis was achieved, the area was cleaned, and bandaids were placed when appropriate.  The patient tolerated the procedure well, and was taken to the recovery room.    Images were saved to PACS.    Post-procedure pain score: 1/10  Follow-up includes:   -f/u phone call in one week  -f/u with referring provider    Keyon Tamayo MD  [facility]

## 2018-02-26 ENCOUNTER — APPOINTMENT (OUTPATIENT)
Dept: CT IMAGING | Facility: CLINIC | Age: 68
End: 2018-02-26
Attending: EMERGENCY MEDICINE
Payer: MEDICARE

## 2018-02-26 ENCOUNTER — HOSPITAL ENCOUNTER (EMERGENCY)
Facility: CLINIC | Age: 68
Discharge: HOME | End: 2018-02-26
Attending: EMERGENCY MEDICINE | Admitting: EMERGENCY MEDICINE
Payer: MEDICARE

## 2018-02-26 VITALS
SYSTOLIC BLOOD PRESSURE: 160 MMHG | DIASTOLIC BLOOD PRESSURE: 88 MMHG | WEIGHT: 139 LBS | TEMPERATURE: 97.7 F | OXYGEN SATURATION: 95 % | RESPIRATION RATE: 16 BRPM | BODY MASS INDEX: 22.44 KG/M2

## 2018-02-26 DIAGNOSIS — S69.91XA INJURY OF RIGHT THUMBNAIL, INITIAL ENCOUNTER: ICD-10-CM

## 2018-02-26 DIAGNOSIS — G89.18 POST-OP PAIN: ICD-10-CM

## 2018-02-26 DIAGNOSIS — S00.03XA CONTUSION OF FACE, SCALP AND NECK, INITIAL ENCOUNTER: ICD-10-CM
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
## 2018-02-26 DIAGNOSIS — S00.83XA CONTUSION OF FACE, SCALP AND NECK, INITIAL ENCOUNTER: ICD-10-CM

## 2018-02-26 DIAGNOSIS — S10.93XA CONTUSION OF FACE, SCALP AND NECK, INITIAL ENCOUNTER: ICD-10-CM

## 2018-02-26 PROCEDURE — 99284 EMERGENCY DEPT VISIT MOD MDM: CPT | Mod: Z6 | Performed by: EMERGENCY MEDICINE

## 2018-02-26 PROCEDURE — 70486 CT MAXILLOFACIAL W/O DYE: CPT

## 2018-02-26 PROCEDURE — 99284 EMERGENCY DEPT VISIT MOD MDM: CPT | Mod: 25 | Performed by: EMERGENCY MEDICINE

## 2018-02-26 PROCEDURE — 70450 CT HEAD/BRAIN W/O DYE: CPT

## 2018-02-26 RX ORDER — OXYCODONE AND ACETAMINOPHEN 5; 325 MG/1; MG/1
1-2 TABLET ORAL EVERY 6 HOURS PRN
Qty: 18 TABLET | Refills: 0 | Status: SHIPPED | OUTPATIENT
Start: 2018-02-26 | End: 2018-06-05

## 2018-02-26 NOTE — ED AVS SNAPSHOT
[facility] Emergency Department    [address] [place] [address]    Phone:  [phone]    Fax:  [phone]                                       Michaela Dorman   MRN: [id number]    Department:  [facility] Emergency Department   Date of Visit:  2/26/2018           Patient Information     Date Of Birth          1950        Your diagnoses for this visit were:     Contusion of face, scalp and neck, initial encounter     Struck by horse, initial encounter     Injury of right thumbnail, initial encounter     Post-op pain        You were seen by Sarah Alegria MD.      Follow-up Information     Follow up with Phani Tapia PA-C.    Specialty:  Physician Assistant    Contact information:    [facility]  [address]  [facility] [address] [phone]          Discharge Instructions       Continue ice to the painful area.    Pain medications as prescribed.    These may cause constipation, so use stool softeners if needed.    Follow-up in clinic if not improving and return at anytime for worsening, changes or concerns.    I hope you heal quickly!!    Discharge References/Attachments     CONTUSION, FACIAL (ENGLISH)    SCALP CONTUSION (ENGLISH)      24 Hour Appointment Hotline       To make an appointment at any [facility], call [phone] ([phone]). If you don't have a family doctor or clinic, we will help you find one. [facility] are conveniently located to serve the needs of you and your family.             Review of your medicines      CONTINUE these medicines which may have CHANGED, or have new prescriptions. If we are uncertain of the size of tablets/capsules you have at home, strength may be listed as something that might have changed.        Dose / Directions Last dose taken    oxyCODONE-acetaminophen 5-325 MG per tablet   Commonly known as:  PERCOCET   Dose:  1-2 tablet   What changed:    - how much to take  - when to take this  -  reasons to take this  - Another medication with the same name was removed. Continue taking this medication, and follow the directions you see here.   Quantity:  18 tablet        Take 1-2 tablets by mouth every 6 hours as needed for moderate to severe pain   Refills:  0          Our records show that you are taking the medicines listed below. If these are incorrect, please call your family doctor or clinic.        Dose / Directions Last dose taken    atorvastatin 20 MG tablet   Commonly known as:  LIPITOR   Dose:  20 mg   Quantity:  90 tablet        Take 1 tablet (20 mg) by mouth daily   Refills:  1        FLUoxetine 40 MG capsule   Commonly known as:  PROzac   Dose:  40 mg   Quantity:  30 capsule        Take 1 capsule (40 mg) by mouth daily   Refills:  1        hydrochlorothiazide 25 MG tablet   Commonly known as:  HYDRODIURIL   Quantity:  90 tablet        TAKE ONE TABLET BY MOUTH ONCE DAILY   Refills:  1        oxybutynin 5 MG tablet   Commonly known as:  DITROPAN   Quantity:  90 tablet        TAKE ONE-HALF TABLET BY MOUTH TWICE A DAY   Refills:  1        prochlorperazine 10 MG tablet   Commonly known as:  COMPAZINE   Dose:  10 mg   Quantity:  30 tablet        Take 1 tablet (10 mg) by mouth every 6 hours as needed (Nausea/Vomiting)   Refills:  3        TYLENOL EX ST ARTHRITIS PAIN 500 MG tablet   Dose:  500-1000 mg   Generic drug:  acetaminophen        Take 500-1,000 mg by mouth every 6 hours as needed for mild pain   Refills:  0          STOP taking        Dose Reason for stopping Comments    oxyCODONE IR 5 MG tablet   Commonly known as:  ROXICODONE                      Prescriptions were sent or printed at these locations (1 Prescription)                   [facility], [place] - [address]   [address], [facility] [address]    Telephone:  [phone]   Fax:  [phone]   Hours:                  Printed at Department/Unit printer (1 of 1)         oxyCODONE-acetaminophen  (PERCOCET) 5-325 MG per tablet                Procedures and tests performed during your visit     Head CT w/o contrast    Maxillofacial  CT w/o contrast      Orders Needing Specimen Collection     None      Pending Results     No orders found from 2/24/2018 to 2/27/2018.            Pending Culture Results     No orders found from 2/24/2018 to 2/27/2018.            Pending Results Instructions     If you had any lab results that were not finalized at the time of your Discharge, you can call the ED Lab Result RN at [phone]. You will be contacted by this team for any positive Lab results or changes in treatment. The nurses are available 7 days a week from 10A to 6:30P.  You can leave a message 24 hours per day and they will return your call.        Thank you for choosing [facility]       Thank you for choosing [facility] for your care. Our goal is always to provide you with excellent care. Hearing back from our patients is one way we can continue to improve our services. Please take a few minutes to complete the written survey that you may receive in the mail after you visit with us. Thank you!        [email or web address] Information     [employer] gives you secure access to your electronic health record. If you see a primary care provider, you can also send messages to your care team and make appointments. If you have questions, please call your primary care clinic.  If you do not have a primary care provider, please call [phone] and they will assist you.        Care EveryWhere ID     This is your Care EveryWhere ID. This could be used by other organizations to access your [facility] medical records  [id number]        Equal Access to Services     SHIVANI ARTIS AH: Amy le Sojose, waaxda luqadaha, qaybta kaalmada wale [place] ademae pena. So [facility] [phone].    ATENCIÓN: Si habla español, tiene a ordoñez disposición servicios gratuitos de asistencia lingüística. Llame al [phone].    We  comply with applicable federal civil rights laws and [place] laws. We do not discriminate on the basis of race, color, national origin, age, disability, sex, sexual orientation, or gender identity.            After Visit Summary       This is your record. Keep this with you and show to your community pharmacist(s) and doctor(s) at your next visit.

## 2018-02-26 NOTE — ED NOTES
Was kicked in the head by a horse yesterday around 1700, injury to left side of head and right hand.

## 2018-02-26 NOTE — ED AVS SNAPSHOT
[facility] Emergency Department    [address] [place] [address]    Phone:  [phone]    Fax:  [phone]                                       Michaela Dorman   MRN: [id number]    Department:  [facility] Emergency Department   Date of Visit:  2/26/2018           After Visit Summary Signature Page     I have received my discharge instructions, and my questions have been answered. I have discussed any challenges I see with this plan with the nurse or doctor.    ..........................................................................................................................................  Patient/Patient Representative Signature      ..........................................................................................................................................  Patient Representative Print Name and Relationship to Patient    ..................................................               ................................................  Date                                            Time    ..........................................................................................................................................  Reviewed by Signature/Title    ...................................................              ..............................................  Date                                                            Time

## 2018-02-26 NOTE — ED PROVIDER NOTES
History     Chief Complaint   Patient presents with     Kicked by horse     The history is provided by the patient and medical records.     This is a 67-year-old female with history of depression, hyperlipidemia presenting after being kicked by horse.  Patient was trying to move her horses from the barn yesterday.  One of the horses kicked her on the left side of her head.  Her  witnessed this event.  No loss of consciousness.  She believes she may have tried to block the kick with her right hand and sustained an injury to her right thumb.  She notes that she has had headache, significant tenderness to the left side of the head since the event.  She states that she had a head bleed many years ago after being kicked by horse and she was concerned.  The event happened last night.  She denies any vision changes, broken teeth or difficulty chewing, ear drainage, neck pain.  No chest pain, shortness of breath, back pain, abdominal pain, other extremity pain except for the thumb.  She is not on blood thinners.  Patient has history of breast cancer and is status post bilateral mastectomy, radiation and chemotherapy.  She recently had her port removed.    Problem List:    Patient Active Problem List    Diagnosis Date Noted     Foraminal stenosis of lumbar region 12/01/2017     Priority: Medium     Complete lumbar spine left at various degrees and to a lesser extent the right as well.       Central stenosis of spinal canal 12/01/2017     Priority: Medium     lumbar         DDD (degenerative disc disease), lumbar 12/01/2017     Priority: Medium     Chronic pain syndrome 11/30/2017     Priority: Medium     substancePatient is followed by Phani Jason PA-C for ongoing prescription of pain medication.  All refills should only be approved by this provider, or covering partner.    Medication(s): Oxycodone IR 5mg.   Maximum quantity per month: 20  Clinic visit frequency required: Q 3 months     Controlled substance  agreement:  Encounter-Level CSA:     There are no encounter-level csa.        Pain Clinic evaluation in the past: Yes       Date/Location:      DIRE Total Score(s):  No flowsheet data found.    Last [facility] website verification:  none   [email or web address]             Lumbar radiculopathy 11/30/2017     Priority: Medium     S/P reverse total shoulder arthroplasty, right 06/05/2017     Priority: Medium     Prophylactic antibiotic for dental procedure indicated due to prior joint replacement 06/05/2017     Priority: Medium     Hyperlipidemia LDL goal <130 05/30/2017     Priority: Medium     Anxiety 03/29/2017     Priority: Medium     S/P bilateral mastectomy 02/08/2017     Priority: Medium     Encounter for antineoplastic chemotherapy 10/07/2016     Priority: Medium     Malignant neoplasm of upper-outer quadrant of left female breast (H) 10/06/2016     Priority: Medium     Adjustment disorder with depressed mood 11/11/2015     Priority: Medium     Essential hypertension 11/11/2015     Priority: Medium     Baker's cyst of knee 02/09/2015     Priority: Medium     Patella-femoral syndrome 02/09/2015     Priority: Medium     Health Care Home 09/29/2011     Priority: Medium     x  DX V65.8 REPLACED WITH 34077 HEALTH CARE HOME (04/08/2013)       Advanced directives, counseling/discussion 08/22/2011     Priority: Medium     Advance Directive Problem List Overview:   Name Relationship Phone    Primary Health Care Agent            Alternative Health Care Agent          Patient states has Advance Directive and will bring in a copy to clinic. 8/22/2011          Trochanteric bursitis 01/06/2009     Priority: Medium     Family history of stroke (cerebrovascular) 03/07/2008     Priority: Medium     Enthesopathy of hip region 01/30/2006     Priority: Medium        Past Medical History:    Past Medical History:   Diagnosis Date     Central stenosis of spinal canal 12/1/2017     DDD (degenerative disc disease), lumbar 12/1/2017      Depressive disorder, not elsewhere classified      Esophageal reflux      ETOH abuse      Foraminal stenosis of lumbar region 12/1/2017     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     Subdural hematoma (H)        Past Surgical History:    Past Surgical History:   Procedure Laterality Date     ARTHROPLASTY SHOULDER Right 11/17/2015     ARTHROSCOPY KNEE  6/7/2012    Procedure:ARTHROSCOPY KNEE; right knee arthroscopy with partial lateral menisectomy; Surgeon:DAMIÁN MCALLISTER; Location:[facility] OR     C LIGATE FALLOPIAN TUBE       C NONSPECIFIC PROCEDURE  1956    ankle fracture surgery     COLONOSCOPY N/A 12/22/2017    Procedure: COLONOSCOPY;  colonoscopy;  Surgeon: Chuck Chand MD;  Location: [facility] GI     EXCISE MASS AXILLA Left 9/16/2016    Procedure: EXCISE MASS AXILLA;  Surgeon: Keyon Blanco MD;  Location: [facility] OR     HC REMV CATARACT EXTRACAP,INSERT LENS  6/25/2009    Right eye     INSERT PORT VASCULAR ACCESS Right 10/7/2016    Procedure: INSERT PORT VASCULAR ACCESS;  Surgeon: Keyon Blanco MD;  Location: [facility] OR     MASTECTOMY SIMPLE BILATERAL Bilateral 2/8/2017    Procedure: MASTECTOMY SIMPLE BILATERAL;  Surgeon: Kyeon Blanco MD;  Location: [facility] OR     OPEN REDUCTION INTERNAL FIXATION FOOT Right 3/20/2015    Procedure: OPEN REDUCTION INTERNAL FIXATION FOOT;  Surgeon: Javi Herrmann DPM;  Location: [facility] OR     REMOVE PORT VASCULAR ACCESS Right 2/14/2018    Procedure: REMOVE PORT VASCULAR ACCESS;  right intra jugular port removal;  Surgeon: Keyon Blanco MD;  Location: [facility] OR     SHOULDER SURGERY Right 11/2015       Family History:    Family History   Problem Relation Age of Onset     Hypertension Mother      Thyroid Disease Mother      CEREBROVASCULAR DISEASE Mother      Hypertension Father      CEREBROVASCULAR DISEASE Father      CANCER Father       skin     Thyroid Disease Sister      DIABETES Brother      type 2     Depression Sister      Breast Cancer Sister      age 47     CANCER Sister      skin     CANCER Brother      inside nose       Social History:  Marital Status:   [2]  Social History   Substance Use Topics     Smoking status: Former Smoker     Packs/day: 3.00     Years: 10.00     Types: Cigarettes     Quit date: 12/1/1979     Smokeless tobacco: Never Used      Comment: approx. 3 packs daily     Alcohol use 0.0 oz/week     0 Standard drinks or equivalent per week      Comment: daily glass of wine        Medications:      oxyCODONE-acetaminophen (PERCOCET) 5-325 MG per tablet   oxybutynin (DITROPAN) 5 MG tablet   prochlorperazine (COMPAZINE) 10 MG tablet   FLUoxetine (PROZAC) 40 MG capsule   hydrochlorothiazide (HYDRODIURIL) 25 MG tablet   atorvastatin (LIPITOR) 20 MG tablet   acetaminophen (TYLENOL EX ST ARTHRITIS PAIN) 500 MG tablet         Review of Systems   All other ROS reviewed and are negative or non-contributory except as stated in HPI.     Physical Exam   BP: 160/88  Heart Rate: 69  Temp: 97.7  F (36.5  C)  Resp: 16  Weight: 63 kg (139 lb)  SpO2: 95 %      Physical Exam   Constitutional: She is oriented to person, place, and time. She appears well-developed and well-nourished.   Thin, older appearing female sitting in the bed   HENT:   Right Ear: External ear normal.   Left Ear: External ear normal.   Nose: Nose normal.   Mouth/Throat: Oropharynx is clear and moist.   Periorbital ecchymosis on the left extending across the left temple into the left scalp area with significant tenderness.  No obvious bony step-offs.   Eyes: Conjunctivae and EOM are normal.   Neck: Normal range of motion. Neck supple.   No midline neck tenderness or pain   Cardiovascular: Normal rate, regular rhythm, normal heart sounds and intact distal pulses.    Pulmonary/Chest: Effort normal and breath sounds normal.   Abdominal: Soft. There is no tenderness.    Musculoskeletal: Normal range of motion.   Swelling of the right thumb.  Normal range of motion.  Ecchymosis at the tip.  The nail is partially torn off.   Neurological: She is alert and oriented to person, place, and time. She exhibits normal muscle tone. Coordination normal.   Skin: Skin is warm and dry. She is not diaphoretic.   Psychiatric: Her behavior is normal.   Vitals reviewed.      ED Course (with Medical Decision Making)    Pt seen and examined by me.  RN and EPIC notes reviewed.      Patient with head injury after being kicked in the head by a horse yesterday.  Possible periorbital injury versus intracranial injury versus skull fracture.  Plan CT scan.  Patient already took 2 Percocet before coming to the ED and declines any other pain medications.  She also declines any further evaluation or intervention with regards to her right thumb.    CT scan shows no acute intracranial abnormalities.  No skull fracture.  No facial bone fracture.    All the results were discussed with the patient and her .  She was given an Rx for Percocet to use for pain.  She again declines any intervention with regards to her thumb.  Continue to ice the area of swelling and pain.  Follow-up in clinic if not improving and return for worsening, changes or concerns.     Procedures      Assessments & Plan     I have reviewed the findings, diagnosis, plan and need for follow up with the patient.    Discharge Medication List as of 2/26/2018 11:29 AM          Final diagnoses:   Contusion of face, scalp and neck, initial encounter   Struck by horse, initial encounter   Injury of right thumbnail, initial encounter     Disposition: Patient discharged home in the care of her  in stable condition.  Plan as above.  Return for concerns.    2/26/2018   [facility] EMERGENCY DEPARTMENT     Sarah Alegria MD  02/26/18 [id number]

## 2018-02-26 NOTE — DISCHARGE INSTRUCTIONS
Continue ice to the painful area.    Pain medications as prescribed.    These may cause constipation, so use stool softeners if needed.    Follow-up in clinic if not improving and return at anytime for worsening, changes or concerns.    I hope you heal quickly!!

## 2018-02-27 NOTE — TELEPHONE ENCOUNTER
Reached out to patient to find out is she has been able to check into any funding.  Patient reported that she has decided not to follow through with starting Nerlynx due to side effects.  Will update Dr. Ferguson and review next plan of care and let patient know.      Juan José Hernandez, RN, BSN, OCN  Oncology Care Coordinator RN  [facility]  [phone]  2/27/2018, 3:23 PM

## 2018-02-28 ENCOUNTER — TELEPHONE (OUTPATIENT)
Dept: RADIOLOGY | Facility: CLINIC | Age: 68
End: 2018-02-28

## 2018-02-28 NOTE — TELEPHONE ENCOUNTER
Patient had a lumbar transforaminal epidural steroid injection at L5-S1 on the left, fluoroscopically guided, contrast controlled on 2/21/18.  Called patient for an update.      Pt reported the following details:  Reports increased pain the first 2 days after the injection but since has some improvement in her pain. Was recently kicked in the head by a horse and was prescribed pain medication. She is unsure what her current pain is due to the pain medication. Told patient that the information will be forwarded to her provider.  Also explained that, if a steroid medication was used, it could take up to 14 days to feel the full effect and if pt has any further questions or concerns pt should call the nurse line at [phone].

## 2018-03-01 NOTE — TELEPHONE ENCOUNTER
Information noted - allow two weeks for steroid effect.    Keyon Tamayo MD  [facility]

## 2018-03-06 ENCOUNTER — OFFICE VISIT (OUTPATIENT)
Dept: URGENT CARE | Facility: RETAIL CLINIC | Age: 68
End: 2018-03-06
Payer: COMMERCIAL

## 2018-03-06 ENCOUNTER — MYC MEDICAL ADVICE (OUTPATIENT)
Dept: FAMILY MEDICINE | Facility: OTHER | Age: 68
End: 2018-03-06

## 2018-03-06 VITALS — TEMPERATURE: 97.7 F | SYSTOLIC BLOOD PRESSURE: 148 MMHG | DIASTOLIC BLOOD PRESSURE: 88 MMHG | HEART RATE: 78 BPM

## 2018-03-06 DIAGNOSIS — S69.91XS THUMB INJURY, RIGHT, SEQUELA: ICD-10-CM

## 2018-03-06 DIAGNOSIS — I10 ESSENTIAL HYPERTENSION: ICD-10-CM

## 2018-03-06 DIAGNOSIS — L08.9 LOCAL INFECTION OF SKIN AND SUBCUTANEOUS TISSUE: Primary | ICD-10-CM

## 2018-03-06 PROCEDURE — 99213 OFFICE O/P EST LOW 20 MIN: CPT | Performed by: PHYSICIAN ASSISTANT

## 2018-03-06 RX ORDER — CEPHALEXIN 500 MG/1
500 CAPSULE ORAL 2 TIMES DAILY
Qty: 20 CAPSULE | Refills: 0 | Status: SHIPPED | OUTPATIENT
Start: 2018-03-06 | End: 2018-05-15

## 2018-03-06 NOTE — MR AVS SNAPSHOT
After Visit Summary   3/6/2018    Michaela Dorman    MRN: [id number]           Patient Information     Date Of Birth          1950        Visit Information        Provider Department      3/6/2018 3:40 PM Mirian Persaud PA-C [facility]        Today's Diagnoses     Local infection of skin and subcutaneous tissue    -  1    Essential hypertension          Care Instructions    Your blood pressure is elevated at today's visit.  Please check your BP twice in the next week or so.   You can do this at local pharmacies, grocery stores or with the float nurse at the [facility]. Record your readings and take them with you to your appointment.  Goal BP <140/90 (new < 130/80)  Do not take decongestants - they can raise your BP.  If you have chest pain, unusual headaches, vision changes or any sign or symptoms of stroke seek prompt medical attention.    /88  Pulse 78  Temp 97.7  F (36.5  C) (Oral)    Please FOLLOW UP at primary care clinic if not improving, new symptoms, worse or this does not resolve.  [facility], [place]  [phone]    ........................  Use careful hygiene.  Wash 2-3 x day with warm water and antibacterial soap (Dial).  Leave open to the air as much as possible.  Elevate area.  Take antibiotic as directed.  Eat yogurt daily while on antibiotic or take a probiotic.  If increase in redness, pain, discharge/drainage or any red streaks to seek prompt medical attention (clinic, urgent care or emergency department ).              Follow-ups after your visit        Who to contact     You can reach your care team any time of the day by calling [phone].  Notification of test results:  If you have an abnormal lab result, we will notify you by phone as soon as possible.         Additional Information About Your Visit        MyChart Information     [employer] gives you secure access to your electronic health record. If you see a primary care  provider, you can also send messages to your care team and make appointments. If you have questions, please call your primary care clinic.  If you do not have a primary care provider, please call [phone] and they will assist you.        Care EveryWhere ID     This is your Care EveryWhere ID. This could be used by other organizations to access your [place] medical records  [id number]        Your Vitals Were     Pulse Temperature                78 97.7  F (36.5  C) (Oral)           Blood Pressure from Last 3 Encounters:   03/06/18 148/88   02/26/18 160/88   02/21/18 142/77    Weight from Last 3 Encounters:   02/26/18 139 lb (63 kg)   02/16/18 142 lb 3.2 oz (64.5 kg)   02/14/18 140 lb 4.8 oz (63.6 kg)              Today, you had the following     No orders found for display         Today's Medication Changes          These changes are accurate as of 3/6/18  4:16 PM.  If you have any questions, ask your nurse or doctor.               Start taking these medicines.        Dose/Directions    cephALEXin 500 MG capsule   Commonly known as:  KEFLEX   Used for:  Local infection of skin and subcutaneous tissue   Started by:  Mirian Persaud PA-C        Dose:  500 mg   Take 1 capsule (500 mg) by mouth 2 times daily   Quantity:  20 capsule   Refills:  0            Where to get your medicines      These medications were sent to [facility] - [place], [place] - [address]  [address], [facility] [address]     Phone:  [phone]     cephALEXin 500 MG capsule                Primary Care Provider Office Phone # Fax #    Phani Tapia PA-C [phone]       [facility] [address] [facility] [address]        Equal Access to Services     DAYAMI ARTIS AH: Hadii aad ku hadmko Sojose, waaxda luqadaha, qaybta kaalmada adeegyada, wale pena. So [facility] [phone].    ATENCIÓN: Si habla español, tiene a ordoñez disposición servicios gratuitos de asistencia lingüística. Llame  al [phone].    We comply with applicable federal civil rights laws and [place] laws. We do not discriminate on the basis of race, color, national origin, age, disability, sex, sexual orientation, or gender identity.            Thank you!     Thank you for choosing [facility]  for your care. Our goal is always to provide you with excellent care. Hearing back from our patients is one way we can continue to improve our services. Please take a few minutes to complete the written survey that you may receive in the mail after your visit with us. Thank you!             Your Updated Medication List - Protect others around you: Learn how to safely use, store and throw away your medicines at www.disposemymeds.org.          This list is accurate as of 3/6/18  4:16 PM.  Always use your most recent med list.                   Brand Name Dispense Instructions for use Diagnosis    atorvastatin 20 MG tablet    LIPITOR    90 tablet    Take 1 tablet (20 mg) by mouth daily    Hyperlipidemia LDL goal <130       cephALEXin 500 MG capsule    KEFLEX    20 capsule    Take 1 capsule (500 mg) by mouth 2 times daily    Local infection of skin and subcutaneous tissue       FLUoxetine 40 MG capsule    PROzac    30 capsule    Take 1 capsule (40 mg) by mouth daily    Adjustment disorder with depressed mood       hydrochlorothiazide 25 MG tablet    HYDRODIURIL    90 tablet    TAKE ONE TABLET BY MOUTH ONCE DAILY    Essential hypertension       oxybutynin 5 MG tablet    DITROPAN    90 tablet    TAKE ONE-HALF TABLET BY MOUTH TWICE A DAY    Dysuria       oxyCODONE-acetaminophen 5-325 MG per tablet    PERCOCET    18 tablet    Take 1-2 tablets by mouth every 6 hours as needed for moderate to severe pain        prochlorperazine 10 MG tablet    COMPAZINE    30 tablet    Take 1 tablet (10 mg) by mouth every 6 hours as needed (Nausea/Vomiting)    Encounter for antineoplastic chemotherapy       TYLENOL EX ST ARTHRITIS PAIN 500 MG  tablet   Generic drug:  acetaminophen      Take 500-1,000 mg by mouth every 6 hours as needed for mild pain    Dermatitis due to sun

## 2018-03-06 NOTE — PROGRESS NOTES
[facility]  She is concerned about finger infection.  Symptoms started yesterday with redness and swelling base of right thumb. She bumped it yest.  See ED note of 2/26/2018 - kicked by a horse and had thumb injury but no redness or > tenderness until yest.  There is > redness and tenderness x 1 day.  No drainage.    No other concerns. NO HA. Bruising on face is improving. No neuro symptoms.  Patient does not feel ill.   DTAP 5 years ago    ROS:  ENT - denies ear pain, throat pain. No nasal congestion.  CP - no cough,SOB or chest pain.   GI/ - Appetite - normal. No nausea, vomiting or diarrhea.   No bowel or bladder changes   MSK - no joint pain or swelling other than her thumb    Past Medical History:   Diagnosis Date     Central stenosis of spinal canal 12/1/2017    lumbar      DDD (degenerative disc disease), lumbar 12/1/2017     Depressive disorder, not elsewhere classified      Esophageal reflux      ETOH abuse     in remission     Foraminal stenosis of lumbar region 12/1/2017    Complete lumbar spine left at various degrees and to a lesser extent the right as well.     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     Subdural hematoma (H)      Past Surgical History:   Procedure Laterality Date     ARTHROPLASTY SHOULDER Right 11/17/2015     ARTHROSCOPY KNEE  6/7/2012    Procedure:ARTHROSCOPY KNEE; right knee arthroscopy with partial lateral menisectomy; Surgeon:DAMIÁN MCALLISTER; Location:[facility] OR     C LIGATE FALLOPIAN TUBE       C NONSPECIFIC PROCEDURE  1956    ankle fracture surgery     COLONOSCOPY N/A 12/22/2017    Procedure: COLONOSCOPY;  colonoscopy;  Surgeon: Chuck Chand MD;  Location: [facility] GI     EXCISE MASS AXILLA Left 9/16/2016    Procedure: EXCISE MASS AXILLA;  Surgeon: Keyon Blanco MD;  Location: [facility] OR     HC REMV CATARACT  EXTRACAP,INSERT LENS  6/25/2009    Right eye     INSERT PORT VASCULAR ACCESS Right 10/7/2016    Procedure: INSERT PORT VASCULAR ACCESS;  Surgeon: Keyon Blanco MD;  Location: [facility] OR     MASTECTOMY SIMPLE BILATERAL Bilateral 2/8/2017    Procedure: MASTECTOMY SIMPLE BILATERAL;  Surgeon: Keyon Blanco MD;  Location: [facility] OR     OPEN REDUCTION INTERNAL FIXATION FOOT Right 3/20/2015    Procedure: OPEN REDUCTION INTERNAL FIXATION FOOT;  Surgeon: Javi Herrmann DPM;  Location: [facility] OR     REMOVE PORT VASCULAR ACCESS Right 2/14/2018    Procedure: REMOVE PORT VASCULAR ACCESS;  right intra jugular port removal;  Surgeon: Keyon Blanco MD;  Location: [facility] OR     SHOULDER SURGERY Right 11/2015     Patient Active Problem List   Diagnosis     Enthesopathy of hip region     Family history of stroke (cerebrovascular)     Trochanteric bursitis     Advanced directives, counseling/discussion     Health Care Home     Baker's cyst of knee     Patella-femoral syndrome     Adjustment disorder with depressed mood     Essential hypertension     Malignant neoplasm of upper-outer quadrant of left female breast (H)     Encounter for antineoplastic chemotherapy     S/P bilateral mastectomy     Anxiety     Hyperlipidemia LDL goal <130     S/P reverse total shoulder arthroplasty, right     Prophylactic antibiotic for dental procedure indicated due to prior joint replacement     Chronic pain syndrome     Lumbar radiculopathy     Foraminal stenosis of lumbar region     Central stenosis of spinal canal     DDD (degenerative disc disease), lumbar     Current Outpatient Prescriptions   Medication     oxyCODONE-acetaminophen (PERCOCET) 5-325 MG per tablet     oxybutynin (DITROPAN) 5 MG tablet     prochlorperazine (COMPAZINE) 10 MG tablet     FLUoxetine (PROZAC) 40 MG capsule     hydrochlorothiazide (HYDRODIURIL) 25 MG tablet     atorvastatin (LIPITOR) 20 MG tablet     acetaminophen (TYLENOL EX ST ARTHRITIS PAIN) 500 MG tablet     No current  facility-administered medications for this visit.        O: /88  Pulse 78  Temp 97.7  F (36.5  C) (Oral)    There is redness, swelling and tenderness rt base of thumb just  prox to pip joint. It look like nail will detach in time.  No fluctuance  or drainage.  Complete ROM and good capillary refill.    A:    Essential hypertension  Local infection of skin and subcutaneous tissue  Thumb injury, right, sequela    P: Prescriptions as below. Discussed indications, dosing, side affects and adverse reactions of medications with  Patient -Ceph  Warm soapy soaks.  Elevate.  Minimal use.  Prescriptions as below. Discussed indications, dosing, side affects and adverse reactions of medications with  pt.  Eat yogurt or take a probioitc when on antibiotics.  IF this is not improving, gets worse, new symptoms or does not resolve need FOLLOW UP with PCP.  Monitor BP    AVS given and discussed:  Patient Instructions   Your blood pressure is elevated at today's visit.  Please check your BP twice in the next week or so.   You can do this at local pharmacies, grocery stores or with the float nurse at the [facility]. Record your readings and take them with you to your appointment.  Goal BP <140/90 (new < 130/80)  Do not take decongestants - they can raise your BP.  If you have chest pain, unusual headaches, vision changes or any sign or symptoms of stroke seek prompt medical attention.    /88  Pulse 78  Temp 97.7  F (36.5  C) (Oral)    Please FOLLOW UP at primary care clinic if not improving, new symptoms, worse or this does not resolve.  [facility] [place]  [phone]    ........................  Use careful hygiene.  Wash 2-3 x day with warm water and antibacterial soap (Dial).  Leave open to the air as much as possible.  Elevate area.  Take antibiotic as directed.  Eat yogurt daily while on antibiotic or take a probiotic.  If increase in redness, pain, discharge/drainage or any red streaks to seek  prompt medical attention (clinic, urgent care or emergency department ).            Patient is comfortable with this plan.  Electronically signed,  MICHELLE Persaud, YURY

## 2018-03-06 NOTE — PATIENT INSTRUCTIONS
Your blood pressure is elevated at today's visit.  Please check your BP twice in the next week or so.   You can do this at local pharmacies, grocery stores or with the float nurse at the [facility]. Record your readings and take them with you to your appointment.  Goal BP <140/90 (new < 130/80)  Do not take decongestants - they can raise your BP.  If you have chest pain, unusual headaches, vision changes or any sign or symptoms of stroke seek prompt medical attention.    /88  Pulse 78  Temp 97.7  F (36.5  C) (Oral)    Please FOLLOW UP at primary care clinic if not improving, new symptoms, worse or this does not resolve.  [facility] [place]  [phone]    ........................  Use careful hygiene.  Wash 2-3 x day with warm water and antibacterial soap (Dial).  Leave open to the air as much as possible.  Elevate area.  Take antibiotic as directed.  Eat yogurt daily while on antibiotic or take a probiotic.  If increase in redness, pain, discharge/drainage or any red streaks to seek prompt medical attention (clinic, urgent care or emergency department ).

## 2018-03-06 NOTE — NURSING NOTE
"Chief Complaint   Patient presents with     Derm Problem     right thumb got kicked about 10 days ago woke up today was swollen and red       Initial /88  Pulse 78  Temp 97.7  F (36.5  C) (Oral) Estimated body mass index is 22.44 kg/(m^2) as calculated from the following:    Height as of 2/14/18: 5' 6\" (1.676 m).    Weight as of 2/26/18: 139 lb (63 kg).  Medication Reconciliation: complete   Aminta Mcarthur      "

## 2018-03-07 NOTE — TELEPHONE ENCOUNTER
[employer] message read with no response.  Will close encounter at this time.    Larry Meadows, RN, BSN

## 2018-03-08 DIAGNOSIS — Z17.0 MALIGNANT NEOPLASM OF BREAST IN FEMALE, ESTROGEN RECEPTOR POSITIVE, UNSPECIFIED LATERALITY, UNSPECIFIED SITE OF BREAST (H): Primary | ICD-10-CM

## 2018-03-08 DIAGNOSIS — C50.412 MALIGNANT NEOPLASM OF UPPER-OUTER QUADRANT OF LEFT FEMALE BREAST (H): ICD-10-CM

## 2018-03-08 DIAGNOSIS — C50.919 MALIGNANT NEOPLASM OF BREAST IN FEMALE, ESTROGEN RECEPTOR POSITIVE, UNSPECIFIED LATERALITY, UNSPECIFIED SITE OF BREAST (H): Primary | ICD-10-CM

## 2018-03-08 NOTE — PROGRESS NOTES
Orders pending per Dr. Ferguson message for patient to follow up with labs.    Juan José Hernandez RN, BSN, OCN  3/8/2018, 8:41 AM

## 2018-03-24 DIAGNOSIS — E78.5 HYPERLIPIDEMIA LDL GOAL <130: ICD-10-CM

## 2018-03-26 RX ORDER — ATORVASTATIN CALCIUM 20 MG/1
20 TABLET, FILM COATED ORAL DAILY
Qty: 90 TABLET | Refills: 1 | Status: SHIPPED | OUTPATIENT
Start: 2018-03-26 | End: 2018-09-04

## 2018-03-26 NOTE — TELEPHONE ENCOUNTER
"Requested Prescriptions   Pending Prescriptions Disp Refills     atorvastatin (LIPITOR) 20 MG tablet 90 tablet 1     Sig: Take 1 tablet (20 mg) by mouth daily    Statins Protocol Passed    3/24/2018  9:51 AM       Passed - LDL on file in past 12 months    Recent Labs   Lab Test  09/18/17   0840   LDL  170*            Passed - No abnormal creatine kinase in past 12 months    No lab results found.            Passed - Recent (12 mo) or future (30 days) visit within the authorizing provider's specialty    Patient had office visit in the last 12 months or has a visit in the next 30 days with authorizing provider or within the authorizing provider's specialty.  See \"Patient Info\" tab in inbasket, or \"Choose Columns\" in Meds & Orders section of the refill encounter.           Passed - Patient is age 18 or older       Passed - No active pregnancy on record       Passed - No positive pregnancy test in past 12 months        Prescription approved per [facility].    Hiral Combs RN    "

## 2018-03-29 ENCOUNTER — TELEPHONE (OUTPATIENT)
Dept: FAMILY MEDICINE | Facility: OTHER | Age: 68
End: 2018-03-29

## 2018-03-29 DIAGNOSIS — F41.9 ANXIETY: ICD-10-CM

## 2018-03-29 NOTE — TELEPHONE ENCOUNTER
What medication? Please pend and flag for provider if not on med list.     Mallika Barahona, RN, BSN

## 2018-03-29 NOTE — TELEPHONE ENCOUNTER
ALPRAZolam (XANAX) 0.5 MG tablet      Last Written Prescription Date:  03/29/2017  Last Fill Quantity: 90,   # refills: 0  Last Office Visit: 02/16/2018  Future Office visit:       Routing refill request to provider for review/approval because:  Drug not on the [facility], P or [facility] refill protocol or controlled substance  Due to end date on prescription, this fell of medication list    Rodna Brandon RN, BSN

## 2018-04-02 RX ORDER — AZITHROMYCIN 500 MG/1
500 TABLET, FILM COATED ORAL ONCE
Qty: 1 TABLET | Refills: 0 | Status: SHIPPED | OUTPATIENT
Start: 2018-04-02 | End: 2019-02-05

## 2018-04-02 RX ORDER — ALPRAZOLAM 0.5 MG
0.5 TABLET ORAL 3 TIMES DAILY PRN
Qty: 30 TABLET | Refills: 0 | Status: SHIPPED | OUTPATIENT
Start: 2018-04-02 | End: 2019-01-11

## 2018-04-25 ENCOUNTER — MYC MEDICAL ADVICE (OUTPATIENT)
Dept: FAMILY MEDICINE | Facility: OTHER | Age: 68
End: 2018-04-25

## 2018-04-26 ENCOUNTER — MYC MEDICAL ADVICE (OUTPATIENT)
Dept: FAMILY MEDICINE | Facility: OTHER | Age: 68
End: 2018-04-26

## 2018-04-26 DIAGNOSIS — F43.21 ADJUSTMENT DISORDER WITH DEPRESSED MOOD: ICD-10-CM

## 2018-04-27 RX ORDER — FLUOXETINE 40 MG/1
CAPSULE ORAL
Qty: 30 CAPSULE | Refills: 0 | Status: SHIPPED | OUTPATIENT
Start: 2018-04-27 | End: 2018-06-05

## 2018-04-27 NOTE — TELEPHONE ENCOUNTER
Routing refill request to provider for review/approval because:  A break in medication.  Rx last prescribed on 1/15/18  Patient needs to be seen because:  Pt is due for a mood f/u.  Last one was over 6 months ago, 9/25/17    Hiral Combs RN

## 2018-05-11 DIAGNOSIS — Z17.0 MALIGNANT NEOPLASM OF BREAST IN FEMALE, ESTROGEN RECEPTOR POSITIVE, UNSPECIFIED LATERALITY, UNSPECIFIED SITE OF BREAST (H): ICD-10-CM

## 2018-05-11 DIAGNOSIS — C50.919 MALIGNANT NEOPLASM OF BREAST IN FEMALE, ESTROGEN RECEPTOR POSITIVE, UNSPECIFIED LATERALITY, UNSPECIFIED SITE OF BREAST (H): ICD-10-CM

## 2018-05-11 LAB
ALBUMIN SERPL-MCNC: 4.3 G/DL (ref 3.4–5)
ALP SERPL-CCNC: 43 U/L (ref 40–150)
ALT SERPL W P-5'-P-CCNC: 44 U/L (ref 0–50)
ANION GAP SERPL CALCULATED.3IONS-SCNC: 7 MMOL/L (ref 3–14)
AST SERPL W P-5'-P-CCNC: 35 U/L (ref 0–45)
BASOPHILS # BLD AUTO: 0 10E9/L (ref 0–0.2)
BASOPHILS NFR BLD AUTO: 0.3 %
BILIRUB SERPL-MCNC: 0.4 MG/DL (ref 0.2–1.3)
BUN SERPL-MCNC: 24 MG/DL (ref 7–30)
CALCIUM SERPL-MCNC: 9.8 MG/DL (ref 8.5–10.1)
CANCER AG27-29 SERPL-ACNC: 30 U/ML (ref 0–39)
CHLORIDE SERPL-SCNC: 103 MMOL/L (ref 94–109)
CO2 SERPL-SCNC: 28 MMOL/L (ref 20–32)
CREAT SERPL-MCNC: 0.81 MG/DL (ref 0.52–1.04)
DIFFERENTIAL METHOD BLD: NORMAL
EOSINOPHIL # BLD AUTO: 0.2 10E9/L (ref 0–0.7)
EOSINOPHIL NFR BLD AUTO: 3 %
ERYTHROCYTE [DISTWIDTH] IN BLOOD BY AUTOMATED COUNT: 14.7 % (ref 10–15)
GFR SERPL CREATININE-BSD FRML MDRD: 70 ML/MIN/1.7M2
GLUCOSE SERPL-MCNC: 90 MG/DL (ref 70–99)
HCT VFR BLD AUTO: 38.7 % (ref 35–47)
HGB BLD-MCNC: 12.7 G/DL (ref 11.7–15.7)
IMM GRANULOCYTES # BLD: 0 10E9/L (ref 0–0.4)
IMM GRANULOCYTES NFR BLD: 0 %
LYMPHOCYTES # BLD AUTO: 1.4 10E9/L (ref 0.8–5.3)
LYMPHOCYTES NFR BLD AUTO: 22.7 %
MCH RBC QN AUTO: 32.9 PG (ref 26.5–33)
MCHC RBC AUTO-ENTMCNC: 32.8 G/DL (ref 31.5–36.5)
MCV RBC AUTO: 100 FL (ref 78–100)
MONOCYTES # BLD AUTO: 0.4 10E9/L (ref 0–1.3)
MONOCYTES NFR BLD AUTO: 7.4 %
NEUTROPHILS # BLD AUTO: 4 10E9/L (ref 1.6–8.3)
NEUTROPHILS NFR BLD AUTO: 66.6 %
PLATELET # BLD AUTO: 252 10E9/L (ref 150–450)
POTASSIUM SERPL-SCNC: 4.2 MMOL/L (ref 3.4–5.3)
PROT SERPL-MCNC: 7.5 G/DL (ref 6.8–8.8)
RBC # BLD AUTO: 3.86 10E12/L (ref 3.8–5.2)
SODIUM SERPL-SCNC: 138 MMOL/L (ref 133–144)
WBC # BLD AUTO: 5.9 10E9/L (ref 4–11)

## 2018-05-11 PROCEDURE — 80053 COMPREHEN METABOLIC PANEL: CPT | Performed by: INTERNAL MEDICINE

## 2018-05-11 PROCEDURE — 86300 IMMUNOASSAY TUMOR CA 15-3: CPT | Performed by: INTERNAL MEDICINE

## 2018-05-11 PROCEDURE — 36415 COLL VENOUS BLD VENIPUNCTURE: CPT | Performed by: INTERNAL MEDICINE

## 2018-05-11 PROCEDURE — 85025 COMPLETE CBC W/AUTO DIFF WBC: CPT | Performed by: INTERNAL MEDICINE

## 2018-05-15 ENCOUNTER — ONCOLOGY VISIT (OUTPATIENT)
Dept: ONCOLOGY | Facility: CLINIC | Age: 68
End: 2018-05-15
Payer: COMMERCIAL

## 2018-05-15 VITALS
BODY MASS INDEX: 22.5 KG/M2 | HEIGHT: 66 IN | WEIGHT: 140 LBS | RESPIRATION RATE: 16 BRPM | SYSTOLIC BLOOD PRESSURE: 108 MMHG | DIASTOLIC BLOOD PRESSURE: 67 MMHG | HEART RATE: 70 BPM | OXYGEN SATURATION: 97 % | TEMPERATURE: 97.2 F

## 2018-05-15 DIAGNOSIS — C50.412 MALIGNANT NEOPLASM OF UPPER-OUTER QUADRANT OF LEFT FEMALE BREAST, UNSPECIFIED ESTROGEN RECEPTOR STATUS (H): Primary | ICD-10-CM

## 2018-05-15 DIAGNOSIS — E78.5 HYPERLIPIDEMIA LDL GOAL <130: ICD-10-CM

## 2018-05-15 DIAGNOSIS — F43.21 ADJUSTMENT DISORDER WITH DEPRESSED MOOD: ICD-10-CM

## 2018-05-15 DIAGNOSIS — I10 ESSENTIAL HYPERTENSION: ICD-10-CM

## 2018-05-15 PROCEDURE — 99214 OFFICE O/P EST MOD 30 MIN: CPT | Performed by: INTERNAL MEDICINE

## 2018-05-15 ASSESSMENT — PAIN SCALES - GENERAL: PAINLEVEL: NO PAIN (0)

## 2018-05-15 NOTE — LETTER
5/15/2018         RE: Michaela Dorman  [address]  [facility] [address]        Dear Colleague,    Thank you for referring your patient, Michaela Dorman, to the [facility]. Please see a copy of my visit note below.    Hematology/ Oncology Follow-up Visit:  May 15, 2018    Reason for Visit:   Chief Complaint   Patient presents with     Oncology Clinic Visit     4 month follow up for Malignant neoplasm of upper-outer quadrant of left breast in female, estrogen receptor negative     Care Plan       Oncologic History:  Malignant neoplasm of upper-outer quadrant of left female breast (H)  Michaela Dorman  initially felt a mass in her left axilla. She then underwent mammogram which showed dense breasts with no suspicious lesions in 4/2016. She was then seen by surgery and underwent excisional LN biopsy that showed metastatic high-grade poorly differentiated carcinoma with a tumor size of 2.5 cm. Immunohistochemistry was done for ER/HI receptors that came back both negative. HER-2/boubacar was amplified by FISH. She then underwent MRI of the breast on 9/26/2016 that showed suspicious abnormality with multicentric left sided breast cancer that involved the left lateral breast form the nipple to the chest wall. A PET scan was obtained on 10/5/2016 that showed a hypermetabolic opacity in the left axilla wihtout other pathological activity. It was then recommended that she undergo neoadjuvant chemotherapy with Cytoxan and Doxorubicin that will be followed by Taxol, Herceptin. Patient initiated treatment on 10/11/2016.         Interval History:  Patient is tentatively for follow-up visit.  She has been feeling well without any recent complaints of weight loss or night sweats or fever or chills.  Denies any nausea or vomiting or diarrhea.  Denies any shortness of breath or cough or wheezing.    Review Of Systems:  Constitutional: Negative for fever, chills, and night sweats.  Skin: negative.  Eyes:  "negative.  Ears/Nose/Throat: negative.  Respiratory: No shortness of breath, dyspnea on exertion, cough, or hemoptysis.  Cardiovascular: negative.  Gastrointestinal: negative.  Genitourinary: negative.  Musculoskeletal: negative.  Neurologic: negative.  Psychiatric: negative.  Hematologic/Lymphatic/Immunologic: negative.  Endocrine: negative.    All other ROS negative unless mentioned in interval history.    Past medical, social, surgical, and family histories reviewed.    Allergies:  Allergies as of 05/15/2018 - Oskar as Reviewed 05/15/2018   Allergen Reaction Noted     No known drug allergies  07/05/2002       Current Medications:  Current Outpatient Prescriptions   Medication Sig Dispense Refill     acetaminophen (TYLENOL EX ST ARTHRITIS PAIN) 500 MG tablet Take 500-1,000 mg by mouth every 6 hours as needed for mild pain       ALPRAZolam (XANAX) 0.5 MG tablet Take 1 tablet (0.5 mg) by mouth 3 times daily as needed for anxiety 30 tablet 0     atorvastatin (LIPITOR) 20 MG tablet Take 1 tablet (20 mg) by mouth daily 90 tablet 1     FLUoxetine (PROZAC) 40 MG capsule TAKE ONE CAPSULE BY MOUTH EVERY DAY 30 capsule 0     hydrochlorothiazide (HYDRODIURIL) 25 MG tablet TAKE ONE TABLET BY MOUTH ONCE DAILY 90 tablet 1     oxybutynin (DITROPAN) 5 MG tablet TAKE ONE-HALF TABLET BY MOUTH TWICE A DAY 90 tablet 1     oxyCODONE-acetaminophen (PERCOCET) 5-325 MG per tablet Take 1-2 tablets by mouth every 6 hours as needed for moderate to severe pain (Patient not taking: Reported on 5/15/2018) 18 tablet 0     prochlorperazine (COMPAZINE) 10 MG tablet Take 1 tablet (10 mg) by mouth every 6 hours as needed (Nausea/Vomiting) (Patient not taking: Reported on 5/15/2018) 30 tablet 3        Physical Exam:  /67 (BP Location: Right arm, Patient Position: Chair, Cuff Size: Adult Regular)  Pulse 70  Temp 97.2  F (36.2  C) (Temporal)  Resp 16  Ht 1.676 m (5' 6\")  Wt 63.5 kg (140 lb)  SpO2 97%  BMI 22.6 kg/m2  Wt Readings from Last " "12 Encounters:   05/15/18 63.5 kg (140 lb)   02/26/18 63 kg (139 lb)   02/16/18 64.5 kg (142 lb 3.2 oz)   02/14/18 63.6 kg (140 lb 4.8 oz)   02/06/18 63.6 kg (140 lb 4.8 oz)   02/02/18 62.4 kg (137 lb 8 oz)   01/16/18 63.6 kg (140 lb 4.8 oz)   01/04/18 64.5 kg (142 lb 4.8 oz)   12/11/17 62.6 kg (138 lb)   12/08/17 63.3 kg (139 lb 9.6 oz)   12/05/17 62.1 kg (136 lb 14.4 oz)   11/30/17 61.2 kg (135 lb)     ECOG performance status: 0  GENERAL APPEARANCE: Healthy, alert and in no acute distress.  HEENT: Sclerae anicteric. PERRLA. Oropharynx without ulcers, lesions, or thrush.  NECK: Supple. No asymmetry or masses.  LYMPHATICS: No palpable cervical, supraclavicular, axillary, or inguinal lymphadenopathy.  RESP: Lungs clear to auscultation bilaterally without rales, rhonchi or wheezes.\", BREAST: Bilateral scars of mastectomies \"CARDIOVASCULAR: Regular rate and rhythm. Normal S1, S2; no S3 or S4. No murmur, gallop, or rub.  ABDOMEN: Soft, nontender. Bowel sounds normal. No palpable organomegaly or masses.  MUSCULOSKELETAL: Extremities without gross deformities noted. No edema of bilateral lower extremities.  SKIN: No suspicious lesions or rashes.  NEURO: Alert and oriented x 3. Cranial nerves II-XII grossly intact.  PSYCHIATRIC: Mentation and affect appear normal.    Laboratory/Imaging Studies:  Orders Only on 05/11/2018   Component Date Value Ref Range Status     CA 27-29 05/11/2018 30  0 - 39 U/mL Final    Assay Method:  Chemiluminescence using Siemens Centaur XP     WBC 05/11/2018 5.9  4.0 - 11.0 10e9/L Final     RBC Count 05/11/2018 3.86  3.8 - 5.2 10e12/L Final     Hemoglobin 05/11/2018 12.7  11.7 - 15.7 g/dL Final     Hematocrit 05/11/2018 38.7  35.0 - 47.0 % Final     MCV 05/11/2018 100  78 - 100 fl Final     MCH 05/11/2018 32.9  26.5 - 33.0 pg Final     MCHC 05/11/2018 32.8  31.5 - 36.5 g/dL Final     RDW 05/11/2018 14.7  10.0 - 15.0 % Final     Platelet Count 05/11/2018 252  150 - 450 10e9/L Final     Diff Method " 05/11/2018 Automated Method   Final     % Neutrophils 05/11/2018 66.6  % Final     % Lymphocytes 05/11/2018 22.7  % Final     % Monocytes 05/11/2018 7.4  % Final     % Eosinophils 05/11/2018 3.0  % Final     % Basophils 05/11/2018 0.3  % Final     % Immature Granulocytes 05/11/2018 0.0  % Final     Absolute Neutrophil 05/11/2018 4.0  1.6 - 8.3 10e9/L Final     Absolute Lymphocytes 05/11/2018 1.4  0.8 - 5.3 10e9/L Final     Absolute Monocytes 05/11/2018 0.4  0.0 - 1.3 10e9/L Final     Absolute Eosinophils 05/11/2018 0.2  0.0 - 0.7 10e9/L Final     Absolute Basophils 05/11/2018 0.0  0.0 - 0.2 10e9/L Final     Abs Immature Granulocytes 05/11/2018 0.0  0 - 0.4 10e9/L Final     Sodium 05/11/2018 138  133 - 144 mmol/L Final     Potassium 05/11/2018 4.2  3.4 - 5.3 mmol/L Final     Chloride 05/11/2018 103  94 - 109 mmol/L Final     Carbon Dioxide 05/11/2018 28  20 - 32 mmol/L Final     Anion Gap 05/11/2018 7  3 - 14 mmol/L Final     Glucose 05/11/2018 90  70 - 99 mg/dL Final     Urea Nitrogen 05/11/2018 24  7 - 30 mg/dL Final     Creatinine 05/11/2018 0.81  0.52 - 1.04 mg/dL Final     GFR Estimate 05/11/2018 70  >60 mL/min/1.7m2 Final    Non  GFR Calc     GFR Estimate If Black 05/11/2018 84  >60 mL/min/1.7m2 Final    African American GFR Calc     Calcium 05/11/2018 9.8  8.5 - 10.1 mg/dL Final     Bilirubin Total 05/11/2018 0.4  0.2 - 1.3 mg/dL Final     Albumin 05/11/2018 4.3  3.4 - 5.0 g/dL Final     Protein Total 05/11/2018 7.5  6.8 - 8.8 g/dL Final     Alkaline Phosphatase 05/11/2018 43  40 - 150 U/L Final     ALT 05/11/2018 44  0 - 50 U/L Final     AST 05/11/2018 35  0 - 45 U/L Final          Assessment and plan:    (C50.412) Malignant neoplasm of upper-outer quadrant of left female breast, unspecified estrogen receptor status (H)  (primary encounter diagnosis)  I reviewed with patient today most recent laboratory test.  There is no clinical evidence of breast cancer recurrence.  We will see the patient  again in 6 months time or sooner if there are new developments or concerns.    (E78.5) Hyperlipidemia LDL goal <130  Patient currently on Lipitor 20 mg orally daily.    (I10) Essential hypertension  Patient currently on hydrochlorothiazide 25 mg orally daily.    (F43.21) Adjustment disorder with depressed mood  Patient currently on Prozac 40 mg orally daily.    The patient is ready to learn, no apparent learning barriers were identified.  Diagnosis and treatment plans were explained to the patient. The patient expressed understanding of the content. The patient asked appropriate questions. The patient questions were answered to her satisfaction.    Chart documentation with Dragon Voice recognition Software. Although reviewed after completion, some words and grammatical errors may remain.    Again, thank you for allowing me to participate in the care of your patient.        Sincerely,        Syeda Ferguson MD

## 2018-05-15 NOTE — NURSING NOTE
DISCHARGE PLAN:  Next appointments: See patient instruction section  Departure Mode: Ambulatory  Accompanied by: self  3 minutes for nursing discharge (face to face time)     Michaela Dorman is here today for Oncology follow up for breast cancer.  Writing nurse seen patient after Medical Oncology appointment to address questions/concerns/coordinate care. Patient to follow up in 6 months with labs. Patient ambulated by nurse to  to schedule follow up and/or lab appointments.  Imaging scheduled if ordered. See patient instructions and Oncologist's Progress note for further details. Questions and concerns addressed to patient's satisfaction. Patient verbalized and demonstrated understanding of plan.  Contact information provided and patient is encouraged to call with any that arise in the interim of care.    Juan José Hernandez, RN, BSN, OCN   Oncology Care Coordinator [facility]  [phone]  5/15/2018, 1:05 PM

## 2018-05-15 NOTE — PROGRESS NOTES
Hematology/ Oncology Follow-up Visit:  May 15, 2018    Reason for Visit:   Chief Complaint   Patient presents with     Oncology Clinic Visit     4 month follow up for Malignant neoplasm of upper-outer quadrant of left breast in female, estrogen receptor negative     Care Plan       Oncologic History:  Malignant neoplasm of upper-outer quadrant of left female breast (H)  Michaela Dorman  initially felt a mass in her left axilla. She then underwent mammogram which showed dense breasts with no suspicious lesions in 4/2016. She was then seen by surgery and underwent excisional LN biopsy that showed metastatic high-grade poorly differentiated carcinoma with a tumor size of 2.5 cm. Immunohistochemistry was done for ER/OK receptors that came back both negative. HER-2/boubacar was amplified by FISH. She then underwent MRI of the breast on 9/26/2016 that showed suspicious abnormality with multicentric left sided breast cancer that involved the left lateral breast form the nipple to the chest wall. A PET scan was obtained on 10/5/2016 that showed a hypermetabolic opacity in the left axilla wihtout other pathological activity. It was then recommended that she undergo neoadjuvant chemotherapy with Cytoxan and Doxorubicin that will be followed by Taxol, Herceptin. Patient initiated treatment on 10/11/2016.         Interval History:  Patient is tentatively for follow-up visit.  She has been feeling well without any recent complaints of weight loss or night sweats or fever or chills.  Denies any nausea or vomiting or diarrhea.  Denies any shortness of breath or cough or wheezing.    Review Of Systems:  Constitutional: Negative for fever, chills, and night sweats.  Skin: negative.  Eyes: negative.  Ears/Nose/Throat: negative.  Respiratory: No shortness of breath, dyspnea on exertion, cough, or hemoptysis.  Cardiovascular: negative.  Gastrointestinal: negative.  Genitourinary: negative.  Musculoskeletal: negative.  Neurologic:  "negative.  Psychiatric: negative.  Hematologic/Lymphatic/Immunologic: negative.  Endocrine: negative.    All other ROS negative unless mentioned in interval history.    Past medical, social, surgical, and family histories reviewed.    Allergies:  Allergies as of 05/15/2018 - Oskar as Reviewed 05/15/2018   Allergen Reaction Noted     No known drug allergies  07/05/2002       Current Medications:  Current Outpatient Prescriptions   Medication Sig Dispense Refill     acetaminophen (TYLENOL EX ST ARTHRITIS PAIN) 500 MG tablet Take 500-1,000 mg by mouth every 6 hours as needed for mild pain       ALPRAZolam (XANAX) 0.5 MG tablet Take 1 tablet (0.5 mg) by mouth 3 times daily as needed for anxiety 30 tablet 0     atorvastatin (LIPITOR) 20 MG tablet Take 1 tablet (20 mg) by mouth daily 90 tablet 1     FLUoxetine (PROZAC) 40 MG capsule TAKE ONE CAPSULE BY MOUTH EVERY DAY 30 capsule 0     hydrochlorothiazide (HYDRODIURIL) 25 MG tablet TAKE ONE TABLET BY MOUTH ONCE DAILY 90 tablet 1     oxybutynin (DITROPAN) 5 MG tablet TAKE ONE-HALF TABLET BY MOUTH TWICE A DAY 90 tablet 1     oxyCODONE-acetaminophen (PERCOCET) 5-325 MG per tablet Take 1-2 tablets by mouth every 6 hours as needed for moderate to severe pain (Patient not taking: Reported on 5/15/2018) 18 tablet 0     prochlorperazine (COMPAZINE) 10 MG tablet Take 1 tablet (10 mg) by mouth every 6 hours as needed (Nausea/Vomiting) (Patient not taking: Reported on 5/15/2018) 30 tablet 3        Physical Exam:  /67 (BP Location: Right arm, Patient Position: Chair, Cuff Size: Adult Regular)  Pulse 70  Temp 97.2  F (36.2  C) (Temporal)  Resp 16  Ht 1.676 m (5' 6\")  Wt 63.5 kg (140 lb)  SpO2 97%  BMI 22.6 kg/m2  Wt Readings from Last 12 Encounters:   05/15/18 63.5 kg (140 lb)   02/26/18 63 kg (139 lb)   02/16/18 64.5 kg (142 lb 3.2 oz)   02/14/18 63.6 kg (140 lb 4.8 oz)   02/06/18 63.6 kg (140 lb 4.8 oz)   02/02/18 62.4 kg (137 lb 8 oz)   01/16/18 63.6 kg (140 lb 4.8 oz) " "  01/04/18 64.5 kg (142 lb 4.8 oz)   12/11/17 62.6 kg (138 lb)   12/08/17 63.3 kg (139 lb 9.6 oz)   12/05/17 62.1 kg (136 lb 14.4 oz)   11/30/17 61.2 kg (135 lb)     ECOG performance status: 0  GENERAL APPEARANCE: Healthy, alert and in no acute distress.  HEENT: Sclerae anicteric. PERRLA. Oropharynx without ulcers, lesions, or thrush.  NECK: Supple. No asymmetry or masses.  LYMPHATICS: No palpable cervical, supraclavicular, axillary, or inguinal lymphadenopathy.  RESP: Lungs clear to auscultation bilaterally without rales, rhonchi or wheezes.\", BREAST: Bilateral scars of mastectomies \"CARDIOVASCULAR: Regular rate and rhythm. Normal S1, S2; no S3 or S4. No murmur, gallop, or rub.  ABDOMEN: Soft, nontender. Bowel sounds normal. No palpable organomegaly or masses.  MUSCULOSKELETAL: Extremities without gross deformities noted. No edema of bilateral lower extremities.  SKIN: No suspicious lesions or rashes.  NEURO: Alert and oriented x 3. Cranial nerves II-XII grossly intact.  PSYCHIATRIC: Mentation and affect appear normal.    Laboratory/Imaging Studies:  Orders Only on 05/11/2018   Component Date Value Ref Range Status     CA 27-29 05/11/2018 30  0 - 39 U/mL Final    Assay Method:  Chemiluminescence using Siemens Centaur XP     WBC 05/11/2018 5.9  4.0 - 11.0 10e9/L Final     RBC Count 05/11/2018 3.86  3.8 - 5.2 10e12/L Final     Hemoglobin 05/11/2018 12.7  11.7 - 15.7 g/dL Final     Hematocrit 05/11/2018 38.7  35.0 - 47.0 % Final     MCV 05/11/2018 100  78 - 100 fl Final     MCH 05/11/2018 32.9  26.5 - 33.0 pg Final     MCHC 05/11/2018 32.8  31.5 - 36.5 g/dL Final     RDW 05/11/2018 14.7  10.0 - 15.0 % Final     Platelet Count 05/11/2018 252  150 - 450 10e9/L Final     Diff Method 05/11/2018 Automated Method   Final     % Neutrophils 05/11/2018 66.6  % Final     % Lymphocytes 05/11/2018 22.7  % Final     % Monocytes 05/11/2018 7.4  % Final     % Eosinophils 05/11/2018 3.0  % Final     % Basophils 05/11/2018 0.3  % " Final     % Immature Granulocytes 05/11/2018 0.0  % Final     Absolute Neutrophil 05/11/2018 4.0  1.6 - 8.3 10e9/L Final     Absolute Lymphocytes 05/11/2018 1.4  0.8 - 5.3 10e9/L Final     Absolute Monocytes 05/11/2018 0.4  0.0 - 1.3 10e9/L Final     Absolute Eosinophils 05/11/2018 0.2  0.0 - 0.7 10e9/L Final     Absolute Basophils 05/11/2018 0.0  0.0 - 0.2 10e9/L Final     Abs Immature Granulocytes 05/11/2018 0.0  0 - 0.4 10e9/L Final     Sodium 05/11/2018 138  133 - 144 mmol/L Final     Potassium 05/11/2018 4.2  3.4 - 5.3 mmol/L Final     Chloride 05/11/2018 103  94 - 109 mmol/L Final     Carbon Dioxide 05/11/2018 28  20 - 32 mmol/L Final     Anion Gap 05/11/2018 7  3 - 14 mmol/L Final     Glucose 05/11/2018 90  70 - 99 mg/dL Final     Urea Nitrogen 05/11/2018 24  7 - 30 mg/dL Final     Creatinine 05/11/2018 0.81  0.52 - 1.04 mg/dL Final     GFR Estimate 05/11/2018 70  >60 mL/min/1.7m2 Final    Non  GFR Calc     GFR Estimate If Black 05/11/2018 84  >60 mL/min/1.7m2 Final    African American GFR Calc     Calcium 05/11/2018 9.8  8.5 - 10.1 mg/dL Final     Bilirubin Total 05/11/2018 0.4  0.2 - 1.3 mg/dL Final     Albumin 05/11/2018 4.3  3.4 - 5.0 g/dL Final     Protein Total 05/11/2018 7.5  6.8 - 8.8 g/dL Final     Alkaline Phosphatase 05/11/2018 43  40 - 150 U/L Final     ALT 05/11/2018 44  0 - 50 U/L Final     AST 05/11/2018 35  0 - 45 U/L Final          Assessment and plan:    (C50.412) Malignant neoplasm of upper-outer quadrant of left female breast, unspecified estrogen receptor status (H)  (primary encounter diagnosis)  I reviewed with patient today most recent laboratory test.  There is no clinical evidence of breast cancer recurrence.  We will see the patient again in 6 months time or sooner if there are new developments or concerns.    (E78.5) Hyperlipidemia LDL goal <130  Patient currently on Lipitor 20 mg orally daily.    (I10) Essential hypertension  Patient currently on  hydrochlorothiazide 25 mg orally daily.    (F43.21) Adjustment disorder with depressed mood  Patient currently on Prozac 40 mg orally daily.    The patient is ready to learn, no apparent learning barriers were identified.  Diagnosis and treatment plans were explained to the patient. The patient expressed understanding of the content. The patient asked appropriate questions. The patient questions were answered to her satisfaction.    Chart documentation with Dragon Voice recognition Software. Although reviewed after completion, some words and grammatical errors may remain.

## 2018-05-15 NOTE — PATIENT INSTRUCTIONS
Please follow up with Dr. Ferguson in 6 months.  Please schedule labs 2-7 days prior to follow up appointment.    Lab Date/Time:    Follow Up Date/Time:     If you have any questions or concerns please feel free to call.    If you need to reschedule please call:  Clinic or Lab Appointment - [phone]  Infusion - [phone]  Imaging - [phone]    Juan José Hernandez RN, BSN, OCN  [facility] [place] [facility]   Oncology/Hematology Care Coordinator RN  [facility]  [phone]

## 2018-05-15 NOTE — NURSING NOTE
"Oncology Rooming Note    May 15, 2018 10:49 AM   Michaela Dorman is a 67 year old female who presents for:    Chief Complaint   Patient presents with     Oncology Clinic Visit     4 month follow up for Malignant neoplasm of upper-outer quadrant of left breast in female, estrogen receptor negative     Care Plan     Initial Vitals: /67 (BP Location: Right arm, Patient Position: Chair, Cuff Size: Adult Regular)  Pulse 70  Temp 97.2  F (36.2  C) (Temporal)  Resp 16  Ht 1.676 m (5' 6\")  Wt 63.5 kg (140 lb)  SpO2 97%  BMI 22.6 kg/m2 Estimated body mass index is 22.6 kg/(m^2) as calculated from the following:    Height as of this encounter: 1.676 m (5' 6\").    Weight as of this encounter: 63.5 kg (140 lb). Body surface area is 1.72 meters squared.  No Pain (0) Comment: Data Unavailable   No LMP recorded. Patient is postmenopausal.  Allergies reviewed: Yes  Medications reviewed: Yes    Medications: Medication refills not needed today.  Pharmacy name entered into Telepo:      [facility] [place], [place] - [address]        Nausea, Vomiting: No  Fever/Chills:  No  Mouth Sores: No  Diarrhea/Constipation: No  Urination: No Concerns  Skin/Excessive dryness: No Concerns  Cracking, Peeling: No  Unusual Bruising/Bleeding: No  Respiratory/SOB: No Concerns  Neuropathy/Numbness&Tingling-Hands/Feet: No  Cognitive Disturbance-Memory: No  Sleep Concerns: No Concerns  Night Sweats:  No  Fatigue/Weakness: No  Light Headed or Dizzy: No  Appetite:  No Concerns  Able to drink 6 to 8 glasses of fluid: No Concerns  Sexuality: No Concerns      Clinical concerns: none Dr. Ferguson was NOT notified.    8  minutes for nursing intake (face to face time)     Serena BELTRAN CMA              "

## 2018-05-15 NOTE — ASSESSMENT & PLAN NOTE
Michaela Dorman  initially felt a mass in her left axilla. She then underwent mammogram which showed dense breasts with no suspicious lesions in 4/2016. She was then seen by surgery and underwent excisional LN biopsy that showed metastatic high-grade poorly differentiated carcinoma with a tumor size of 2.5 cm. Immunohistochemistry was done for ER/AZ receptors that came back both negative. HER-2/boubacar was amplified by FISH. She then underwent MRI of the breast on 9/26/2016 that showed suspicious abnormality with multicentric left sided breast cancer that involved the left lateral breast form the nipple to the chest wall. A PET scan was obtained on 10/5/2016 that showed a hypermetabolic opacity in the left axilla wihtout other pathological activity. It was then recommended that she undergo neoadjuvant chemotherapy with Cytoxan and Doxorubicin that will be followed by Taxol, Herceptin. Patient initiated treatment on 10/11/2016.

## 2018-05-15 NOTE — MR AVS SNAPSHOT
After Visit Summary   5/15/2018    Michaela Dorman    MRN: [id number]           Patient Information     Date Of Birth          1950        Visit Information        Provider Department      5/15/2018 10:30 AM Syeda Ferguson MD [facility]        Today's Diagnoses     Malignant neoplasm of upper-outer quadrant of left female breast, unspecified estrogen receptor status (H)    -  1    Hyperlipidemia LDL goal <130        Essential hypertension        Adjustment disorder with depressed mood          Care Instructions      Please follow up with Dr. Ferguson in 6 months.  Please schedule labs 2-7 days prior to follow up appointment.    Lab Date/Time:    Follow Up Date/Time:     If you have any questions or concerns please feel free to call.    If you need to reschedule please call:  Clinic or Lab Appointment - [phone]  Infusion - [phone]  Imaging - [phone]    Juan José Hernandez RN, BSN, OCN  [facility]   Oncology/Hematology Care Coordinator RN  [facility]  [phone]              Follow-ups after your visit        Follow-up notes from your care team     Return in about 6 months (around 11/15/2018) for Blood work before next appointment.      Future tests that were ordered for you today     Open Future Orders        Priority Expected Expires Ordered    CBC with platelets differential Routine 11/15/2018 5/15/2019 5/15/2018    Comprehensive metabolic panel Routine 11/15/2018 5/15/2019 5/15/2018    Ca27.29  breast tumor marker Routine 11/15/2018 5/15/2019 5/15/2018            Who to contact     If you have questions or need follow up information about today's clinic visit or your schedule please contact [facility] directly at [phone].  Normal or non-critical lab and imaging results will be communicated to you by MyChart, letter or phone within 4 business days after the clinic has received the results. If you do not hear from us within 7  "days, please contact the clinic through [employer] or phone. If you have a critical or abnormal lab result, we will notify you by phone as soon as possible.  Submit refill requests through [employer] or call your pharmacy and they will forward the refill request to us. Please allow 3 business days for your refill to be completed.          Additional Information About Your Visit        tamycahart Information     [employer] gives you secure access to your electronic health record. If you see a primary care provider, you can also send messages to your care team and make appointments. If you have questions, please call your primary care clinic.  If you do not have a primary care provider, please call [phone] and they will assist you.        Care EveryWhere ID     This is your Care EveryWhere ID. This could be used by other organizations to access your [place] medical records  [id number]        Your Vitals Were     Pulse Temperature Respirations Height Pulse Oximetry BMI (Body Mass Index)    70 97.2  F (36.2  C) (Temporal) 16 1.676 m (5' 6\") 97% 22.6 kg/m2       Blood Pressure from Last 3 Encounters:   05/15/18 108/67   03/06/18 148/88   02/26/18 160/88    Weight from Last 3 Encounters:   05/15/18 63.5 kg (140 lb)   02/26/18 63 kg (139 lb)   02/16/18 64.5 kg (142 lb 3.2 oz)               Primary Care Provider Office Phone # Fax #    Phani Tapia PA-C [phone] [id number] [facility] [address]        Equal Access to Services     DAYAMI ARTIS AH: Hadii aad ku hadasho Soomaali, waaxda luqadaha, qaybta kaalmada adeegyada, wale pena. So [facility] [phone].    ATENCIÓN: Si habla español, tiene a ordoñez disposición servicios gratuitos de asistencia lingüística. Llame al [phone].    We comply with applicable federal civil rights laws and [place] laws. We do not discriminate on the basis of race, color, national origin, age, disability, sex, sexual orientation, or gender " identity.            Thank you!     Thank you for choosing [facility]  for your care. Our goal is always to provide you with excellent care. Hearing back from our patients is one way we can continue to improve our services. Please take a few minutes to complete the written survey that you may receive in the mail after your visit with us. Thank you!             Your Updated Medication List - Protect others around you: Learn how to safely use, store and throw away your medicines at www.disposemymeds.org.          This list is accurate as of 5/15/18 11:16 AM.  Always use your most recent med list.                   Brand Name Dispense Instructions for use Diagnosis    ALPRAZolam 0.5 MG tablet    XANAX    30 tablet    Take 1 tablet (0.5 mg) by mouth 3 times daily as needed for anxiety    Anxiety       atorvastatin 20 MG tablet    LIPITOR    90 tablet    Take 1 tablet (20 mg) by mouth daily    Hyperlipidemia LDL goal <130       FLUoxetine 40 MG capsule    PROzac    30 capsule    TAKE ONE CAPSULE BY MOUTH EVERY DAY    Adjustment disorder with depressed mood       hydrochlorothiazide 25 MG tablet    HYDRODIURIL    90 tablet    TAKE ONE TABLET BY MOUTH ONCE DAILY    Essential hypertension       oxybutynin 5 MG tablet    DITROPAN    90 tablet    TAKE ONE-HALF TABLET BY MOUTH TWICE A DAY    Dysuria       oxyCODONE-acetaminophen 5-325 MG per tablet    PERCOCET    18 tablet    Take 1-2 tablets by mouth every 6 hours as needed for moderate to severe pain        prochlorperazine 10 MG tablet    COMPAZINE    30 tablet    Take 1 tablet (10 mg) by mouth every 6 hours as needed (Nausea/Vomiting)    Encounter for antineoplastic chemotherapy       TYLENOL EX ST ARTHRITIS PAIN 500 MG tablet   Generic drug:  acetaminophen      Take 500-1,000 mg by mouth every 6 hours as needed for mild pain    Dermatitis due to sun

## 2018-05-16 ENCOUNTER — MYC MEDICAL ADVICE (OUTPATIENT)
Dept: FAMILY MEDICINE | Facility: OTHER | Age: 68
End: 2018-05-16

## 2018-05-16 NOTE — TELEPHONE ENCOUNTER
LMTC Please assist patient in scheduling as this is a controlled substance. PCP last prescribed in November.   Rocio Velásquez CMA (MA)

## 2018-05-31 DIAGNOSIS — F43.21 ADJUSTMENT DISORDER WITH DEPRESSED MOOD: ICD-10-CM

## 2018-06-01 RX ORDER — FLUOXETINE 40 MG/1
CAPSULE ORAL
Qty: 14 CAPSULE | Refills: 0 | Status: SHIPPED | OUTPATIENT
Start: 2018-06-01 | End: 2018-06-05

## 2018-06-04 NOTE — PROGRESS NOTES
SUBJECTIVE:   Michaela Dorman is a 67 year old female who presents to clinic today for the following health issues:    History of Present Illness     Depression & Anxiety Follow-up:     Depression/Anxiety:  Depression & Anxiety    Status since last visit::  Stable    Other associated symptoms of depression and anxiety::  None    Significant life event::  No    Current substance use::  None       Today's PHQ-9         PHQ-9 Total Score:     (P) 1   PHQ-9 Q9 Suicidal ideation:   (P) Not at all   Thoughts of suicide or self harm:      Self-harm Plan:        Self-harm Action:          Safety concerns for self or others:       AUSTIN-7 Total Score: (P) 0    Diet:  Regular (no restrictions)  Frequency of exercise:  4-5 days/week  Duration of exercise:  Greater than 60 minutes  Taking medications regularly:  Yes  Medication side effects:  None  Additional concerns today:  No    Problem list and histories reviewed & adjusted, as indicated.  Additional history: as documented    Patient Active Problem List   Diagnosis     Enthesopathy of hip region     Family history of stroke (cerebrovascular)     Trochanteric bursitis     Advanced directives, counseling/discussion     Health Care Home     Hypertension goal BP (blood pressure) < 140/90     Baker's cyst of knee     Patella-femoral syndrome     Adjustment disorder with depressed mood     Essential hypertension     Malignant neoplasm of upper-outer quadrant of left female breast (H)     Encounter for antineoplastic chemotherapy     S/P bilateral mastectomy     Anxiety     Hyperlipidemia LDL goal <130     S/P reverse total shoulder arthroplasty, right     Prophylactic antibiotic for dental procedure indicated due to prior joint replacement     Chronic pain syndrome     Lumbar radiculopathy     Foraminal stenosis of lumbar region     Central stenosis of spinal canal     DDD (degenerative disc disease), lumbar     Past Surgical History:   Procedure Laterality Date      ARTHROPLASTY SHOULDER Right 11/17/2015     ARTHROSCOPY KNEE  6/7/2012    Procedure:ARTHROSCOPY KNEE; right knee arthroscopy with partial lateral menisectomy; Surgeon:DAMIÁN MCALLISTER; Location:[facility] OR     C LIGATE FALLOPIAN TUBE       C NONSPECIFIC PROCEDURE  1956    ankle fracture surgery     COLONOSCOPY N/A 12/22/2017    Procedure: COLONOSCOPY;  colonoscopy;  Surgeon: Chuck Chand MD;  Location: [facility] GI     EXCISE MASS AXILLA Left 9/16/2016    Procedure: EXCISE MASS AXILLA;  Surgeon: Keyon Blanco MD;  Location: [facility] OR     HC REMV CATARACT EXTRACAP,INSERT LENS  6/25/2009    Right eye     INSERT PORT VASCULAR ACCESS Right 10/7/2016    Procedure: INSERT PORT VASCULAR ACCESS;  Surgeon: Keyon Blanco MD;  Location: [facility] OR     MASTECTOMY SIMPLE BILATERAL Bilateral 2/8/2017    Procedure: MASTECTOMY SIMPLE BILATERAL;  Surgeon: Keyon Blanco MD;  Location: [facility] OR     OPEN REDUCTION INTERNAL FIXATION FOOT Right 3/20/2015    Procedure: OPEN REDUCTION INTERNAL FIXATION FOOT;  Surgeon: Javi Herrmann DPM;  Location: [facility] OR     REMOVE PORT VASCULAR ACCESS Right 2/14/2018    Procedure: REMOVE PORT VASCULAR ACCESS;  right intra jugular port removal;  Surgeon: Keyon Blanco MD;  Location: [facility] OR     SHOULDER SURGERY Right 11/2015       Social History   Substance Use Topics     Smoking status: Former Smoker     Packs/day: 3.00     Years: 10.00     Types: Cigarettes     Quit date: 12/1/1979     Smokeless tobacco: Never Used      Comment: approx. 3 packs daily     Alcohol use 0.0 oz/week     0 Standard drinks or equivalent per week      Comment: daily glass of wine     Family History   Problem Relation Age of Onset     Hypertension Mother      Thyroid Disease Mother      CEREBROVASCULAR DISEASE Mother      Hypertension Father      CEREBROVASCULAR DISEASE Father      CANCER Father      skin     Thyroid Disease Sister      DIABETES Brother      type 2     Depression Sister      Breast Cancer Sister      age 47      CANCER Sister      skin     CANCER Brother      inside nose         Current Outpatient Prescriptions   Medication Sig Dispense Refill     acetaminophen (TYLENOL EX ST ARTHRITIS PAIN) 500 MG tablet Take 500-1,000 mg by mouth every 6 hours as needed for mild pain       ALPRAZolam (XANAX) 0.5 MG tablet Take 1 tablet (0.5 mg) by mouth 3 times daily as needed for anxiety 30 tablet 0     atorvastatin (LIPITOR) 20 MG tablet Take 1 tablet (20 mg) by mouth daily 90 tablet 1     FLUoxetine (PROZAC) 40 MG capsule Take 1 capsule (40 mg) by mouth daily 90 capsule 1     hydrochlorothiazide (HYDRODIURIL) 25 MG tablet Take 1 tablet (25 mg) by mouth daily 90 tablet 1     oxybutynin (DITROPAN) 5 MG tablet TAKE ONE-HALF TABLET BY MOUTH TWICE A DAY 90 tablet 1     oxyCODONE-acetaminophen (PERCOCET) 5-325 MG per tablet Take 1-2 tablets by mouth every 6 hours as needed for moderate to severe pain 18 tablet 0     prochlorperazine (COMPAZINE) 10 MG tablet Take 1 tablet (10 mg) by mouth every 6 hours as needed (Nausea/Vomiting) 30 tablet 3     [DISCONTINUED] FLUoxetine (PROZAC) 40 MG capsule TAKE ONE CAPSULE BY MOUTH EVERY DAY. NO FURTHER REFILLS WITHOUT OFFICE VISIT 14 capsule 0     [DISCONTINUED] FLUoxetine (PROZAC) 40 MG capsule TAKE ONE CAPSULE BY MOUTH EVERY DAY 30 capsule 0     [DISCONTINUED] hydrochlorothiazide (HYDRODIURIL) 25 MG tablet TAKE ONE TABLET BY MOUTH ONCE DAILY 90 tablet 1     Allergies   Allergen Reactions     No Known Drug Allergies      Recent Labs   Lab Test  05/11/18   1012  02/12/18   0917  01/16/18   1405   09/18/17   0840   01/24/17   1016   08/31/16   1442   10/06/11   0754   08/02/10   0832   A1C   --    --    --    --    --    --   5.0   --    --    --    --    --    --    LDL   --    --    --    --   170*   --    --    --   150*   --   149*   --   120   HDL   --    --    --    --   67   --    --    --    --    --    --    --   72   TRIG   --    --    --    --   178*   --    --    --    --    --    --    --    125   ALT  44  22  26   < >  25   < >  49   < >   --    --   19   < >   --    CR  0.81  0.87  1.01   < >  0.98   < >  0.92   < >   --    < >  0.95   < >   --    GFRESTIMATED  70  64  54*   < >  57*   < >  61   < >   --    < >  60*   < >   --    GFRESTBLACK  84  78  66   < >  69   < >  73   < >   --    < >  72   < >   --    POTASSIUM  4.2  3.9  3.6   < >  4.2   < >  3.9   < >   --    < >  4.0   < >   --    TSH   --    --    --    --    --    --   1.34   --   0.39*   < >   --    --   0.81    < > = values in this interval not displayed.      BP Readings from Last 3 Encounters:   06/05/18 126/64   05/15/18 108/67   03/06/18 148/88    Wt Readings from Last 3 Encounters:   06/05/18 143 lb 9.6 oz (65.1 kg)   05/15/18 140 lb (63.5 kg)   02/26/18 139 lb (63 kg)                    ROS:  Constitutional, HEENT, cardiovascular, pulmonary, gi and gu systems are negative, except as otherwise noted.    OBJECTIVE:     /64 (Cuff Size: Adult Regular)  Pulse 64  Temp 98.7  F (37.1  C) (Temporal)  Resp 20  Wt 143 lb 9.6 oz (65.1 kg)  BMI 23.18 kg/m2  Body mass index is 23.18 kg/(m^2).  GENERAL: healthy, alert and no distress  NECK: no adenopathy, no asymmetry, masses, or scars and thyroid normal to palpation  RESP: lungs clear to auscultation - no rales, rhonchi or wheezes  CV: regular rate and rhythm, normal S1 S2, no S3 or S4, no murmur, click or rub, no peripheral edema and peripheral pulses strong  ABDOMEN: soft, nontender, no hepatosplenomegaly, no masses and bowel sounds normal  MS: no gross musculoskeletal defects noted, no edema  Psychological:   Negative for memory, mood or flight of ideas at this time with appropriate affect.  Good recall of date, time and place.    Diagnostic Test Results:  No results found for this or any previous visit (from the past 24 hour(s)).    ASSESSMENT/PLAN:     1. Need for prophylactic vaccination against Streptococcus pneumoniae (pneumococcus)  Due today  - Pneumococcal vaccine 23  valent PPSV23  (Pneumovax) [[id number]]    2. Essential hypertension  - hydrochlorothiazide (HYDRODIURIL) 25 MG tablet; Take 1 tablet (25 mg) by mouth daily  Dispense: 90 tablet; Refill: 1  - Pneumococcal vaccine 23 valent PPSV23  (Pneumovax) [[id number]]    3. Adjustment disorder with depressed mood  Doing well.  - FLUoxetine (PROZAC) 40 MG capsule; Take 1 capsule (40 mg) by mouth daily  Dispense: 90 capsule; Refill: 1  - Pneumococcal vaccine 23 valent PPSV23  (Pneumovax) [[id number]]    4. Chronic pain syndrome  5. DDD (degenerative disc disease), lumbar  Refilled medications and have her use them judiciously and minimally.  - oxyCODONE-acetaminophen (PERCOCET) 5-325 MG per tablet; Take 1-2 tablets by mouth every 6 hours as needed for moderate to severe pain  Dispense: 18 tablet; Refill: 0  - PCA1550 - Pain Drug Screen, Urine, with reported meds ([facility])  - Pneumococcal vaccine 23 valent PPSV23  (Pneumovax) [[id number]]    6. Hypertension goal BP (blood pressure) < 140/90  Refilled medications as noted above follow-up in 6 months  - Albumin Random Urine Quantitative with Creat Ratio  - Pneumococcal vaccine 23 valent PPSV23  (Pneumovax) [[id number]]    Follow-up in 6 months is advised.  She is doing well no new concerns.    Phani Jason PA-C  [facility]  Answers for HPI/ROS submitted by the patient on 6/5/2018   PHQ-2 Score: 0  If you checked off any problems, how difficult have these problems made it for you to do your work, take care of things at home, or get along with other people?: Not difficult at all  PHQ9 TOTAL SCORE: 1  AUSTIN 7 TOTAL SCORE: 0

## 2018-06-05 ENCOUNTER — TELEPHONE (OUTPATIENT)
Dept: FAMILY MEDICINE | Facility: OTHER | Age: 68
End: 2018-06-05

## 2018-06-05 ENCOUNTER — OFFICE VISIT (OUTPATIENT)
Dept: FAMILY MEDICINE | Facility: OTHER | Age: 68
End: 2018-06-05
Payer: COMMERCIAL

## 2018-06-05 VITALS
TEMPERATURE: 98.7 F | DIASTOLIC BLOOD PRESSURE: 64 MMHG | WEIGHT: 143.6 LBS | RESPIRATION RATE: 20 BRPM | HEART RATE: 64 BPM | SYSTOLIC BLOOD PRESSURE: 126 MMHG | BODY MASS INDEX: 23.18 KG/M2

## 2018-06-05 DIAGNOSIS — M51.369 DDD (DEGENERATIVE DISC DISEASE), LUMBAR: ICD-10-CM

## 2018-06-05 DIAGNOSIS — Z23 NEED FOR PROPHYLACTIC VACCINATION AGAINST STREPTOCOCCUS PNEUMONIAE (PNEUMOCOCCUS): ICD-10-CM

## 2018-06-05 DIAGNOSIS — I10 HYPERTENSION GOAL BP (BLOOD PRESSURE) < 140/90: ICD-10-CM

## 2018-06-05 DIAGNOSIS — I10 ESSENTIAL HYPERTENSION: Primary | ICD-10-CM

## 2018-06-05 DIAGNOSIS — G89.4 CHRONIC PAIN SYNDROME: ICD-10-CM

## 2018-06-05 DIAGNOSIS — F43.21 ADJUSTMENT DISORDER WITH DEPRESSED MOOD: ICD-10-CM

## 2018-06-05 LAB
CREAT UR-MCNC: 96 MG/DL
MICROALBUMIN UR-MCNC: 7 MG/L
MICROALBUMIN/CREAT UR: 7.21 MG/G CR (ref 0–25)

## 2018-06-05 PROCEDURE — 82043 UR ALBUMIN QUANTITATIVE: CPT | Performed by: PHYSICIAN ASSISTANT

## 2018-06-05 PROCEDURE — 99214 OFFICE O/P EST MOD 30 MIN: CPT | Mod: 25 | Performed by: PHYSICIAN ASSISTANT

## 2018-06-05 PROCEDURE — 2894A HC ADMIN PNEUMOCOCCAL VACCINE: CPT | Performed by: PHYSICIAN ASSISTANT

## 2018-06-05 PROCEDURE — 80307 DRUG TEST PRSMV CHEM ANLYZR: CPT | Mod: 90 | Performed by: PHYSICIAN ASSISTANT

## 2018-06-05 PROCEDURE — 99000 SPECIMEN HANDLING OFFICE-LAB: CPT | Performed by: PHYSICIAN ASSISTANT

## 2018-06-05 PROCEDURE — 2894A C PNEUMOCOCCAL CONJ VACCINE 13 VALENT (PCV13) IM: CPT | Performed by: PHYSICIAN ASSISTANT

## 2018-06-05 RX ORDER — FLUOXETINE 40 MG/1
40 CAPSULE ORAL DAILY
Qty: 90 CAPSULE | Refills: 1 | Status: SHIPPED | OUTPATIENT
Start: 2018-06-05 | End: 2018-12-26

## 2018-06-05 RX ORDER — OXYCODONE AND ACETAMINOPHEN 5; 325 MG/1; MG/1
1-2 TABLET ORAL EVERY 6 HOURS PRN
Qty: 18 TABLET | Refills: 0 | Status: SHIPPED | OUTPATIENT
Start: 2018-06-05 | End: 2018-09-27

## 2018-06-05 RX ORDER — HYDROCHLOROTHIAZIDE 25 MG/1
25 TABLET ORAL DAILY
Qty: 90 TABLET | Refills: 1 | Status: SHIPPED | OUTPATIENT
Start: 2018-06-05 | End: 2019-02-05

## 2018-06-05 ASSESSMENT — ANXIETY QUESTIONNAIRES
7. FEELING AFRAID AS IF SOMETHING AWFUL MIGHT HAPPEN: NOT AT ALL
GAD7 TOTAL SCORE: 0
5. BEING SO RESTLESS THAT IT IS HARD TO SIT STILL: NOT AT ALL
GAD7 TOTAL SCORE: 0
3. WORRYING TOO MUCH ABOUT DIFFERENT THINGS: NOT AT ALL
7. FEELING AFRAID AS IF SOMETHING AWFUL MIGHT HAPPEN: NOT AT ALL
6. BECOMING EASILY ANNOYED OR IRRITABLE: NOT AT ALL
4. TROUBLE RELAXING: NOT AT ALL
GAD7 TOTAL SCORE: 0
2. NOT BEING ABLE TO STOP OR CONTROL WORRYING: NOT AT ALL
1. FEELING NERVOUS, ANXIOUS, OR ON EDGE: NOT AT ALL

## 2018-06-05 ASSESSMENT — PAIN SCALES - GENERAL: PAINLEVEL: NO PAIN (0)

## 2018-06-05 ASSESSMENT — PATIENT HEALTH QUESTIONNAIRE - PHQ9
10. IF YOU CHECKED OFF ANY PROBLEMS, HOW DIFFICULT HAVE THESE PROBLEMS MADE IT FOR YOU TO DO YOUR WORK, TAKE CARE OF THINGS AT HOME, OR GET ALONG WITH OTHER PEOPLE: NOT DIFFICULT AT ALL
SUM OF ALL RESPONSES TO PHQ QUESTIONS 1-9: 1
SUM OF ALL RESPONSES TO PHQ QUESTIONS 1-9: 1

## 2018-06-05 NOTE — MR AVS SNAPSHOT
After Visit Summary   6/5/2018    Michaela Dorman    MRN: [id number]           Patient Information     Date Of Birth          1950        Visit Information        Provider Department      6/5/2018 3:00 PM Phani Tapia PA-C [facility] [place]        Today's Diagnoses     Need for prophylactic vaccination against Streptococcus pneumoniae (pneumococcus)    -  1    Essential hypertension        Adjustment disorder with depressed mood        Chronic pain syndrome        DDD (degenerative disc disease), lumbar        Hypertension goal BP (blood pressure) < 140/90           Follow-ups after your visit        Your next 10 appointments already scheduled     Nov 15, 2018 10:00 AM CST   LAB with NL LAB [facility] ([facility])    [address] [place] [id number] [phone]           Please do not eat 10-12 hours before your appointment if you are coming in fasting for labs on lipids, cholesterol, or glucose (sugar). This does not apply to pregnant women. Water, hot tea and black coffee (with nothing added) are okay. Do not drink other fluids, diet soda or chew gum.            Nov 20, 2018 10:00 AM CST   Return Visit with Syeda Ferguson MD   [facility] ([facility])    [address] [place] [id number] [phone]            Nov 20, 2018 12:45 PM CST   Return Visit with Loren Felipe MD   [facility] ([facility])    [address] [place] [address] [phone]              Who to contact     If you have questions or need follow up information about today's clinic visit or your schedule please contact [facility] directly at [phone].  Normal or non-critical lab and imaging results will be communicated to you by MyChart, letter or phone within 4 business days after the clinic has received the results. If you do not hear from us  within 7 days, please contact the clinic through [employer] or phone. If you have a critical or abnormal lab result, we will notify you by phone as soon as possible.  Submit refill requests through [employer] or call your pharmacy and they will forward the refill request to us. Please allow 3 business days for your refill to be completed.          Additional Information About Your Visit        [employer] Information     [employer] gives you secure access to your electronic health record. If you see a primary care provider, you can also send messages to your care team and make appointments. If you have questions, please call your primary care clinic.  If you do not have a primary care provider, please call [phone] and they will assist you.        Care EveryWhere ID     This is your Care EveryWhere ID. This could be used by other organizations to access your [place] medical records  [id number]        Your Vitals Were     Pulse Temperature Respirations BMI (Body Mass Index)          64 98.7  F (37.1  C) (Temporal) 20 23.18 kg/m2         Blood Pressure from Last 3 Encounters:   06/05/18 126/64   05/15/18 108/67   03/06/18 148/88    Weight from Last 3 Encounters:   06/05/18 143 lb 9.6 oz (65.1 kg)   05/15/18 140 lb (63.5 kg)   02/26/18 139 lb (63 kg)              We Performed the Following     Albumin Random Urine Quantitative with Creat Ratio     [id number] - Pain Drug Screen, Urine, with reported meds (MedTox)     Pneumococcal vaccine 23 valent PPSV23  (Pneumovax) [56147]          Today's Medication Changes          These changes are accurate as of 6/5/18  3:18 PM.  If you have any questions, ask your nurse or doctor.               These medicines have changed or have updated prescriptions.        Dose/Directions    FLUoxetine 40 MG capsule   Commonly known as:  PROzac   This may have changed:    - See the new instructions.  - Another medication with the same name was removed. Continue taking this medication, and follow the  directions you see here.   Used for:  Adjustment disorder with depressed mood   Changed by:  Phani Tapia PA-C        Dose:  40 mg   Take 1 capsule (40 mg) by mouth daily   Quantity:  90 capsule   Refills:  1       hydrochlorothiazide 25 MG tablet   Commonly known as:  HYDRODIURIL   This may have changed:  See the new instructions.   Used for:  Essential hypertension   Changed by:  Phani Tapia PA-C        Dose:  25 mg   Take 1 tablet (25 mg) by mouth daily   Quantity:  90 tablet   Refills:  1            Where to get your medicines      These medications were sent to [facility] [address]   [address] [facility] [address]     Phone:  [phone]     FLUoxetine 40 MG capsule    hydrochlorothiazide 25 MG tablet         Some of these will need a paper prescription and others can be bought over the counter.  Ask your nurse if you have questions.     Bring a paper prescription for each of these medications     oxyCODONE-acetaminophen 5-325 MG per tablet               Information about OPIOIDS     PRESCRIPTION OPIOIDS: WHAT YOU NEED TO KNOW   You have a prescription for an opioid (narcotic) pain medicine. Opioids can cause addiction. If you have a history of chemical dependency of any type, you are at a higher risk of becoming addicted to opioids. Only take this medicine after all other options have been tried. Take it for as short a time and as few doses as possible.     Do not:    Drive. If you drive while taking these medicines, you could be arrested for driving under the influence (DUI).    Operate heavy machinery    Do any other dangerous activities while taking these medicines.     Drink any alcohol while taking these medicines.      Take with any other medicines that contain acetaminophen. Read all labels carefully. Look for the word  acetaminophen  or  Tylenol.  Ask your pharmacist if you have questions or are unsure.    Store your pills in a secure place, locked if possible.  We will not replace any lost or stolen medicine. If you don t finish your medicine, please throw away (dispose) as directed by your pharmacist. The [place] Pollution [employer] Agency has more information about safe disposal: https://www.pca.[facility].mn.us/living-green/managing-unwanted-medications    All opioids tend to cause constipation. Drink plenty of water and eat foods that have a lot of fiber, such as fruits, vegetables, prune juice, apple juice and high-fiber cereal. Take a laxative (Miralax, milk of magnesia, Colace, Senna) if you don t move your bowels at least every other day.          Primary Care Provider Office Phone # Fax #    Phani Tapia PA-C [phone] [address] [facility] [address]        Equal Access to Services     SHIVANI ARTIS : Amy le Sojose, waaxda luqadaha, qaybta kaalmajackson [place], wale pena. So [facility] [phone].    ATENCIÓN: Si habla español, tiene a ordoñez disposición servicios gratuitos de asistencia lingüística. Shaneka al [phone].    We comply with applicable federal civil rights laws and [place] laws. We do not discriminate on the basis of race, color, national origin, age, disability, sex, sexual orientation, or gender identity.            Thank you!     Thank you for choosing [facility]  for your care. Our goal is always to provide you with excellent care. Hearing back from our patients is one way we can continue to improve our services. Please take a few minutes to complete the written survey that you may receive in the mail after your visit with us. Thank you!             Your Updated Medication List - Protect others around you: Learn how to safely use, store and throw away your medicines at www.disposemymeds.org.          This list is accurate as of 6/5/18  3:18 PM.  Always use your most recent med list.                   Brand Name Dispense Instructions for use Diagnosis    ALPRAZolam  0.5 MG tablet    XANAX    30 tablet    Take 1 tablet (0.5 mg) by mouth 3 times daily as needed for anxiety    Anxiety       atorvastatin 20 MG tablet    LIPITOR    90 tablet    Take 1 tablet (20 mg) by mouth daily    Hyperlipidemia LDL goal <130       FLUoxetine 40 MG capsule    PROzac    90 capsule    Take 1 capsule (40 mg) by mouth daily    Adjustment disorder with depressed mood       hydrochlorothiazide 25 MG tablet    HYDRODIURIL    90 tablet    Take 1 tablet (25 mg) by mouth daily    Essential hypertension       oxybutynin 5 MG tablet    DITROPAN    90 tablet    TAKE ONE-HALF TABLET BY MOUTH TWICE A DAY    Dysuria       oxyCODONE-acetaminophen 5-325 MG per tablet    PERCOCET    18 tablet    Take 1-2 tablets by mouth every 6 hours as needed for moderate to severe pain    Chronic pain syndrome, DDD (degenerative disc disease), lumbar       prochlorperazine 10 MG tablet    COMPAZINE    30 tablet    Take 1 tablet (10 mg) by mouth every 6 hours as needed (Nausea/Vomiting)    Encounter for antineoplastic chemotherapy       TYLENOL EX ST ARTHRITIS PAIN 500 MG tablet   Generic drug:  acetaminophen      Take 500-1,000 mg by mouth every 6 hours as needed for mild pain    Dermatitis due to sun

## 2018-06-05 NOTE — TELEPHONE ENCOUNTER
Left message to call please transfer patient to Larry or Debra.  Rocio Velásquez CMA ([facility])

## 2018-06-05 NOTE — NURSING NOTE
Prior to injection verified patient identity using patient's name and date of birth.  Due to injection administration, patient instructed to remain in clinic for 15 minutes  afterwards, and to report any adverse reaction to me immediately.      Screening Questionnaire for Adult Immunization    Are you sick today?   No   Do you have allergies to medications, food, a vaccine component or latex?   No   Have you ever had a serious reaction after receiving a vaccination?   No   Do you have a long-term health problem with heart disease, lung disease, asthma, kidney disease, metabolic disease (e.g. diabetes), anemia, or other blood disorder?   No   Do you have cancer, leukemia, HIV/AIDS, or any other immune system problem?   No   In the past 3 months, have you taken medications that affect  your immune system, such as prednisone, other steroids, or anticancer drugs; drugs for the treatment of rheumatoid arthritis, Crohn s disease, or psoriasis; or have you had radiation treatments?   No   Have you had a seizure, or a brain or other nervous system problem?   No   During the past year, have you received a transfusion of blood or blood     products, or been given immune (gamma) globulin or antiviral drug?   No   For women: Are you pregnant or is there a chance you could become        pregnant during the next month?   No   Have you received any vaccinations in the past 4 weeks?   No     Immunization questionnaire answers were all negative.        Per orders of Phani Jason PA-C  , injection of Prevnar given by Rocio Velásquez. Patient instructed to remain in clinic for 15 minutes afterwards, and to report any adverse reaction to me immediately.       Screening performed by Rocio Velásquez on 6/5/2018 at 5:15 PM.

## 2018-06-06 ASSESSMENT — PATIENT HEALTH QUESTIONNAIRE - PHQ9: SUM OF ALL RESPONSES TO PHQ QUESTIONS 1-9: 1

## 2018-06-06 ASSESSMENT — ANXIETY QUESTIONNAIRES: GAD7 TOTAL SCORE: 0

## 2018-06-06 NOTE — TELEPHONE ENCOUNTER
Spoke with patient.  Informed that she was given a second Prevnar 13 yesterday.    Advised to watch for routine vaccine reactions as stated on VIS.  Will need pneumo 23 in a year per Phani Jason PA-C    No further questions at this time.    Larry Meadows, RN, BSN

## 2018-06-07 NOTE — MR AVS SNAPSHOT
After Visit Summary   3/7/2017    Michaela Dorman    MRN: [id number]           Patient Information     Date Of Birth          1950        Visit Information        Provider Department      3/7/2017 11:30 AM NL INFUSION CHAIR 3 [facility]        Today's Diagnoses     Malignant neoplasm of upper-outer quadrant of left female breast (H)    -  1    Encounter for antineoplastic chemotherapy           Follow-ups after your visit        Your next 10 appointments already scheduled     Mar 10, 2017  1:30 PM CST   Level 1 with NL INFUSION CHAIR 2   [facility] ([facility])    [address] [place] [address]   [phone]            Mar 10, 2017  2:00 PM CST   PE NPET ONCOLOGY (EYES TO THIGHS) with PHPET1   [facility] PET CT Scan ([facility])    [address] [place] [address]   [phone]           Tell your doctor:   If there is any chance you may be pregnant or if you are breastfeeding.   If you have problems lying in small spaces (claustrophobia). If you do, your doctor may give you medicine to help you relax. If you have diabetes:   Have your exam early in the morning. Your blood glucose will go up as the day goes by.   Your glucose level must be 180 or less at the start of the exam. Please take any medicines you need to ensure this blood glucose level. 24 hours before your scan: Don t do any heavy exercise. (No jogging, aerobics or other workouts.) Exercise will make your pictures less accurate. 6 hours before your scan:   Stop all food and liquids (except water).   Do not chew gum or suck on mints.   If you need to take medicine with food, you may take it with a few crackers.  Please call your Imaging Department at your exam site with any questions.            Mar 27, 2017   Procedure with Damien Pollard MD   [facility] ([facility])    [address]  Patient Seen in: [facility] AND [facility] Emergency Department    History   Patient presents with:  Fall (musculoskeletal, neurologic)    Stated Complaint: fall - head lac, left rib pain     HPI    43-year-old female presents with chief complaints of forehead l "[facility] [id number] [phone]            Apr 11, 2017  3:00 PM CDT   Return Visit with Syeda Ferguson MD   [facility] ([facility])    [address] [facility] [address] [phone]              Who to contact     If you have questions or need follow up information about today's clinic visit or your schedule please contact [facility] directly at [phone].  Normal or non-critical lab and imaging results will be communicated to you by [employer], letter or phone within 4 business days after the clinic has received the results. If you do not hear from us within 7 days, please contact the clinic through [employer] or phone. If you have a critical or abnormal lab result, we will notify you by phone as soon as possible.  Submit refill requests through [employer] or call your pharmacy and they will forward the refill request to us. Please allow 3 business days for your refill to be completed.          Additional Information About Your Visit        [employer] Information     [employer] gives you secure access to your electronic health record. If you see a primary care provider, you can also send messages to your care team and make appointments. If you have questions, please call your primary care clinic.  If you do not have a primary care provider, please call [phone] and they will assist you.        Care EveryWhere ID     This is your Care EveryWhere ID. This could be used by other organizations to access your [place] medical records  [id number]        Your Vitals Were     Pulse Temperature Respirations Height Pulse Oximetry BMI (Body Mass Index)    91 98.1  F (36.7  C) (Temporal) 18 1.676 m (5' 6\") 97% 22.69 kg/m2       Blood Pressure from Last 3 Encounters:   03/07/17 134/68   02/28/17 130/76   02/14/17 146/82    Weight from Last 3 Encounters:   03/07/17 63.8 kg (140 lb 9.6 oz)   02/28/17 63.2 kg (139 lb 4.8 oz)   02/16/17 64.3 kg (141 " then one tablet daily. PANTOPRAZOLE SODIUM 40 MG Oral Tab EC,  TAKE 1 TABLET (40 MG TOTAL) BY MOUTH ONCE DAILY. Amitriptyline HCl 10 MG Oral Tab,  Take 1 tablet by mouth nightly. DIVIGEL 0.5 MG/0.5GM Transdermal Gel,  Apply topically daily.  Not takin lb 12.8 oz)              Today, you had the following     No orders found for display       Primary Care Provider Office Phone # Fax #    Phani Tapia PA-C [phone]       [facility] [address] [facility] [address]        Thank you!     Thank you for choosing [facility]  for your care. Our goal is always to provide you with excellent care. Hearing back from our patients is one way we can continue to improve our services. Please take a few minutes to complete the written survey that you may receive in the mail after your visit with us. Thank you!             Your Updated Medication List - Protect others around you: Learn how to safely use, store and throw away your medicines at www.disposemymeds.org.          This list is accurate as of: 3/7/17  1:23 PM.  Always use your most recent med list.                   Brand Name Dispense Instructions for use    FLUoxetine 20 MG capsule    PROzac    270 capsule    TAKE THREE CAPSULES BY MOUTH EVERY DAY TITRATE AS DIRECTED       hydrochlorothiazide 25 MG tablet    HYDRODIURIL    90 tablet    TAKE ONE TABLET BY MOUTH ONCE DAILY       HYDROcodone-acetaminophen 5-325 MG per tablet    NORCO    18 tablet    Take 1 tablet by mouth every 4 hours as needed for pain Maximum 8 tablet(s) per day       LORazepam 0.5 MG tablet    ATIVAN    30 tablet    Take 1 tablet (0.5 mg) by mouth every 4 hours as needed (Anxiety, Nausea/Vomiting or Sleep)       * oxyCODONE-acetaminophen 5-325 MG per tablet    PERCOCET    30 tablet    Take 1-2 tablets by mouth every 4 hours as needed for pain (moderate to severe)       * oxyCODONE-acetaminophen 5-325 MG per tablet    PERCOCET    20 tablet    Take 1 tablet by mouth every 4 hours as needed for pain       priLOSEC 20 MG CR capsule   Generic drug:  omeprazole      Take by mouth daily       prochlorperazine 10 MG tablet    COMPAZINE    30 tablet    Take 1 tablet (10 mg) by mouth every 6 hours as  n/a    Current:/53   Pulse 87   Temp 98 °F (36.7 °C)   Resp 18   Ht 162.6 cm (5' 4\")   Wt 61.2 kg   SpO2 98%   BMI 23.17 kg/m²     PULSE OX within normal limits on room air as interpreted by this provider.       Constitutional: The patient is coopera bilateral wrists, hands and fingers. No compartment syndrome. No edema. Remainder of musculoskeletal system is grossly intact. There is no obvious deformity. Neurological: Cranial nerve II through XII intact. DTRs intact and symmetrical bilaterally. needed (Nausea/Vomiting)       simvastatin 20 MG tablet    ZOCOR    90 tablet    Take 1 tablet (20 mg) by mouth At Bedtime       TYLENOL EX ST ARTHRITIS PAIN 500 MG tablet   Generic drug:  acetaminophen      Take 500-1,000 mg by mouth every 6 hours as needed for mild pain       venlafaxine 37.5 MG 24 hr capsule    EFFEXOR XR    30 capsule    Take 1 capsule (37.5 mg) by mouth daily       * Notice:  This list has 2 medication(s) that are the same as other medications prescribed for you. Read the directions carefully, and ask your doctor or other care provider to review them with you.       (primary encounter diagnosis)  Traumatic hematoma of forehead, initial encounter  Closed head injury without loss of consciousness, initial encounter  Contusion of hand, unspecified laterality, initial encounter  Rib contusion, left, initial encounter    D

## 2018-06-09 LAB — PAIN DRUG SCR UR W RPTD MEDS: NORMAL

## 2018-06-11 DIAGNOSIS — I10 ESSENTIAL HYPERTENSION: ICD-10-CM

## 2018-06-12 RX ORDER — HYDROCHLOROTHIAZIDE 25 MG/1
25 TABLET ORAL DAILY
Qty: 90 TABLET | Refills: 1 | OUTPATIENT
Start: 2018-06-12

## 2018-06-12 NOTE — TELEPHONE ENCOUNTER
Duplicate.  Rx sent to the requesting pharmacy on 6/5/18 for a 3 month supply with an additional 1 refills.    Hiral Combs RN

## 2018-06-14 ENCOUNTER — MYC MEDICAL ADVICE (OUTPATIENT)
Dept: FAMILY MEDICINE | Facility: OTHER | Age: 68
End: 2018-06-14

## 2018-06-15 ENCOUNTER — OFFICE VISIT (OUTPATIENT)
Dept: URGENT CARE | Facility: RETAIL CLINIC | Age: 68
End: 2018-06-15
Payer: COMMERCIAL

## 2018-06-15 VITALS
HEART RATE: 77 BPM | TEMPERATURE: 98.6 F | DIASTOLIC BLOOD PRESSURE: 77 MMHG | OXYGEN SATURATION: 96 % | SYSTOLIC BLOOD PRESSURE: 131 MMHG

## 2018-06-15 DIAGNOSIS — N30.01 ACUTE CYSTITIS WITH HEMATURIA: Primary | ICD-10-CM

## 2018-06-15 DIAGNOSIS — R30.0 DYSURIA: ICD-10-CM

## 2018-06-15 LAB
APPEARANCE UR: CLEAR
BILIRUB UR QL: ABNORMAL
COLOR UR: ABNORMAL
GLUCOSE URINE: ABNORMAL MG/DL
HGB UR QL: ABNORMAL
KETONES UR QL: 5 MG/DL
LEUKOCYTE ESTERASE URINE: ABNORMAL
NITRITE UR QL STRIP: ABNORMAL
PH UR STRIP: 5 PH (ref 5–7)
PROTEIN ALBUMIN URINE: ABNORMAL MG/DL
SOURCE: ABNORMAL
SP GR UR STRIP: 1.01 (ref 1–1.03)
UROBILINOGEN UR QL STRIP: 0.2 EU/DL (ref 0.2–1)

## 2018-06-15 PROCEDURE — 99213 OFFICE O/P EST LOW 20 MIN: CPT | Performed by: PHYSICIAN ASSISTANT

## 2018-06-15 PROCEDURE — 87086 URINE CULTURE/COLONY COUNT: CPT | Performed by: PHYSICIAN ASSISTANT

## 2018-06-15 PROCEDURE — 81002 URINALYSIS NONAUTO W/O SCOPE: CPT | Mod: QW | Performed by: PHYSICIAN ASSISTANT

## 2018-06-15 RX ORDER — NITROFURANTOIN 25; 75 MG/1; MG/1
100 CAPSULE ORAL 2 TIMES DAILY
Qty: 10 CAPSULE | Refills: 0 | Status: SHIPPED | OUTPATIENT
Start: 2018-06-15 | End: 2018-06-17

## 2018-06-15 NOTE — MR AVS SNAPSHOT
"              After Visit Summary   6/15/2018    Michaela Dorman    MRN: [id number]           Patient Information     Date Of Birth          1950        Visit Information        Provider Department      6/15/2018 11:00 AM Mirian Persaud PA-C [facility]        Today's Diagnoses     Acute cystitis with hematuria    -  1    Dysuria          Care Instructions       * BLADDER INFECTION,Female (Adult)    A bladder infection (\"cystitis\" or \"UTI\") usually causes a constant urge to urinate and a burning when passing urine. Urine may be cloudy, smelly or dark. There may be pain in the lower abdomen. A bladder infection occurs when bacteria from the vaginal area enter the bladder opening (urethra). This can occur from sexual intercourse, wearing tight clothing, dehydration and other factors.  HOME CARE:  1. Drink lots of fluids (at least 6-8 glasses a day, unless you must restrict fluids for other medical reasons). This will force the medicine into your urinary system and flush the bacteria out of your body. Cranberry juice has been shown to help clear out the bacteria.  2. Avoid sexual intercourse until your symptoms are gone.  3. A bladder infection is treated with antibiotics. You may also be given Pyridium (generic = phenazopyridine) to reduce the burning sensation. This medicine will cause your urine to become a bright orange color. The orange urine may stain clothing. You may wear a pad or panty-liner to protect clothing.  PREVENTING FUTURE INFECTIONS:  1. Always wipe from front to back after a bowel movement.  2. Keep the genital area clean and dry.  3. Drink plenty of fluids each day to avoid dehydration.  4. Urinate right after intercourse to flush out the bladder.  5. Wear cotton underwear and cotton-lined panty hose; avoid tight-fitting pants.  6. If you are on birth control pills and are having frequent bladder infections, discuss with your doctor.  FOLLOW UP: Return to this facility " or see your doctor if ALL symptoms are not gone after three days of treatment.  GET PROMPT MEDICAL ATTENTION if any of the following occur:    Fever over 101 F (38.3 C)    No improvement by the third day of treatment    Increasing back or abdominal pain    Repeated vomiting; unable to keep medicine down    Weakness, dizziness or fainting    Vaginal discharge    Pain, redness or swelling in the labia (outer vaginal area)    1311-7729 [employer]. [address] [place] [address], [place] [address]. All rights reserved. This information is not intended as a substitute for professional medical care. Always follow your healthcare professional's instructions.  This information has been modified by your health care provider with permission from the publisher.      ...................  We will contact you if the urine culture indicates a change in treatment plan.    Please FOLLOW UP at primary care clinic if not improving, new symptoms, worse or this does not resolve.  [facility]  [phone]  [facility]  [phone]              Follow-ups after your visit        Your next 10 appointments already scheduled     Nov 15, 2018 10:00 AM CST   LAB with NL LAB [facility] ([facility])    [address] [place] [id number]   [phone]           Please do not eat 10-12 hours before your appointment if you are coming in fasting for labs on lipids, cholesterol, or glucose (sugar). This does not apply to pregnant women. Water, hot tea and black coffee (with nothing added) are okay. Do not drink other fluids, diet soda or chew gum.            Nov 20, 2018 10:00 AM CST   Return Visit with Syeda Ferguson MD   [facility] ([facility])    [address] [place] [phone]   [phone]            Nov 20, 2018 12:45 PM CST   Return Visit with Loren Felipe MD   [facility] ([facility]  [facility]) [address] [place] [id number] [phone]              Who to contact     You can reach your care team any time of the day by calling [phone].  Notification of test results:  If you have an abnormal lab result, we will notify you by phone as soon as possible.         Additional Information About Your Visit        MyChart Information     [employer] gives you secure access to your electronic health record. If you see a primary care provider, you can also send messages to your care team and make appointments. If you have questions, please call your primary care clinic.  If you do not have a primary care provider, please call [phone] and they will assist you.        Care EveryWhere ID     This is your Care EveryWhere ID. This could be used by other organizations to access your [facility] medical records  [id number]        Your Vitals Were     Pulse Temperature Pulse Oximetry             77 98.6  F (37  C) (Tympanic) 96%          Blood Pressure from Last 3 Encounters:   06/15/18 131/77   06/05/18 126/64   05/15/18 108/67    Weight from Last 3 Encounters:   06/05/18 143 lb 9.6 oz (65.1 kg)   05/15/18 140 lb (63.5 kg)   02/26/18 139 lb (63 kg)              We Performed the Following     HCL U/A, W/O MICRO, NON AUTO     Urine Culture Aerobic Bacterial          Today's Medication Changes          These changes are accurate as of 6/15/18 11:01 AM.  If you have any questions, ask your nurse or doctor.               Start taking these medicines.        Dose/Directions    nitroFURantoin (macrocrystal-monohydrate) 100 MG capsule   Commonly known as:  MACROBID   Used for:  Acute cystitis with hematuria   Started by:  Mirian Persaud PA-C        Dose:  100 mg   Take 1 capsule (100 mg) by mouth 2 times daily   Quantity:  10 capsule   Refills:  0            Where to get your medicines      These medications were sent to [facility] - [address]  [address], [place]  [place] [address]     Phone:  [phone]     nitroFURantoin (macrocrystal-monohydrate) 100 MG capsule                Primary Care Provider Office Phone # Fax #    Phani Tapia PA-C [phone] [address] [facility] [address]        Equal Access to Services     SHIVANI ARTIS : Hadii aad ku hadasho Soomaali, waaxda luqadaha, qaybta kaalmada adeegyada, waxay idiin hayaan adeeg khadairsh lajaen . So [facility] [phone].    ATENCIÓN: Si habla español, tiene a ordoñez disposición servicios gratuitos de asistencia lingüística. Kalieame al [phone].    We comply with applicable federal civil rights laws and [place] laws. We do not discriminate on the basis of race, color, national origin, age, disability, sex, sexual orientation, or gender identity.            Thank you!     Thank you for choosing [facility]  for your care. Our goal is always to provide you with excellent care. Hearing back from our patients is one way we can continue to improve our services. Please take a few minutes to complete the written survey that you may receive in the mail after your visit with us. Thank you!             Your Updated Medication List - Protect others around you: Learn how to safely use, store and throw away your medicines at www.disposemymeds.org.          This list is accurate as of 6/15/18 11:01 AM.  Always use your most recent med list.                   Brand Name Dispense Instructions for use Diagnosis    ALPRAZolam 0.5 MG tablet    XANAX    30 tablet    Take 1 tablet (0.5 mg) by mouth 3 times daily as needed for anxiety    Anxiety       atorvastatin 20 MG tablet    LIPITOR    90 tablet    Take 1 tablet (20 mg) by mouth daily    Hyperlipidemia LDL goal <130       FLUoxetine 40 MG capsule    PROzac    90 capsule    Take 1 capsule (40 mg) by mouth daily    Adjustment disorder with depressed mood       hydrochlorothiazide 25 MG tablet    HYDRODIURIL    90 tablet    Take 1 tablet (25 mg) by mouth  daily    Essential hypertension       nitroFURantoin (macrocrystal-monohydrate) 100 MG capsule    MACROBID    10 capsule    Take 1 capsule (100 mg) by mouth 2 times daily    Acute cystitis with hematuria       oxybutynin 5 MG tablet    DITROPAN    90 tablet    TAKE ONE-HALF TABLET BY MOUTH TWICE A DAY    Dysuria       oxyCODONE-acetaminophen 5-325 MG per tablet    PERCOCET    18 tablet    Take 1-2 tablets by mouth every 6 hours as needed for moderate to severe pain    Chronic pain syndrome, DDD (degenerative disc disease), lumbar       prochlorperazine 10 MG tablet    COMPAZINE    30 tablet    Take 1 tablet (10 mg) by mouth every 6 hours as needed (Nausea/Vomiting)    Encounter for antineoplastic chemotherapy       TYLENOL EX ST ARTHRITIS PAIN 500 MG tablet   Generic drug:  acetaminophen      Take 500-1,000 mg by mouth every 6 hours as needed for mild pain    Dermatitis due to sun

## 2018-06-15 NOTE — PATIENT INSTRUCTIONS
"   * BLADDER INFECTION,Female (Adult)    A bladder infection (\"cystitis\" or \"UTI\") usually causes a constant urge to urinate and a burning when passing urine. Urine may be cloudy, smelly or dark. There may be pain in the lower abdomen. A bladder infection occurs when bacteria from the vaginal area enter the bladder opening (urethra). This can occur from sexual intercourse, wearing tight clothing, dehydration and other factors.  HOME CARE:  1. Drink lots of fluids (at least 6-8 glasses a day, unless you must restrict fluids for other medical reasons). This will force the medicine into your urinary system and flush the bacteria out of your body. Cranberry juice has been shown to help clear out the bacteria.  2. Avoid sexual intercourse until your symptoms are gone.  3. A bladder infection is treated with antibiotics. You may also be given Pyridium (generic = phenazopyridine) to reduce the burning sensation. This medicine will cause your urine to become a bright orange color. The orange urine may stain clothing. You may wear a pad or panty-liner to protect clothing.  PREVENTING FUTURE INFECTIONS:  1. Always wipe from front to back after a bowel movement.  2. Keep the genital area clean and dry.  3. Drink plenty of fluids each day to avoid dehydration.  4. Urinate right after intercourse to flush out the bladder.  5. Wear cotton underwear and cotton-lined panty hose; avoid tight-fitting pants.  6. If you are on birth control pills and are having frequent bladder infections, discuss with your doctor.  FOLLOW UP: Return to this facility or see your doctor if ALL symptoms are not gone after three days of treatment.  GET PROMPT MEDICAL ATTENTION if any of the following occur:    Fever over 101 F (38.3 C)    No improvement by the third day of treatment    Increasing back or abdominal pain    Repeated vomiting; unable to keep medicine down    Weakness, dizziness or fainting    Vaginal discharge    Pain, redness or swelling in " the labia (outer vaginal area)    4851-5925 [employer]. [address] [place] [address], [place] [address]. All rights reserved. This information is not intended as a substitute for professional medical care. Always follow your healthcare professional's instructions.  This information has been modified by your health care provider with permission from the publisher.      ...................  We will contact you if the urine culture indicates a change in treatment plan.    Please FOLLOW UP at primary care clinic if not improving, new symptoms, worse or this does not resolve.  [facility] ([facility])  [phone]  [facility]  [phone]

## 2018-06-15 NOTE — PROGRESS NOTES
Michaela Dorman is a 67 year old female who  Presents to the [facility] today for a possible UTI. Symptoms of dysuria, frequency, and polyuria have been going on for 2 days.  There is no history of backache, fever, chills, nausea or vomiting.  No recent history of vaginal discharge or infection. No history of pyelonephritis. No concerns for STD's. This patient does  have a history of urinary tract infections.     ROS:  ENT - denies ear pain, throat pain. No nasal congestion.  CP - no cough,SOB or chest pain.   GI/ - Appetite - normal. No nausea, vomiting or diarrhea.   No bowel  changes   MSK - no joint pain or swelling.   Skin-  No rashes. No lesions.    .pmnh  Past Surgical History:   Procedure Laterality Date     ARTHROPLASTY SHOULDER Right 11/17/2015     ARTHROSCOPY KNEE  6/7/2012    Procedure:ARTHROSCOPY KNEE; right knee arthroscopy with partial lateral menisectomy; Surgeon:DAMIÁN MCALLISTER; Location:[facility] OR     C LIGATE FALLOPIAN TUBE       C NONSPECIFIC PROCEDURE  1956    ankle fracture surgery     COLONOSCOPY N/A 12/22/2017    Procedure: COLONOSCOPY;  colonoscopy;  Surgeon: Chuck Chand MD;  Location: [facility] GI     EXCISE MASS AXILLA Left 9/16/2016    Procedure: EXCISE MASS AXILLA;  Surgeon: Keyon Blanco MD;  Location: [facility] OR     HC REMV CATARACT EXTRACAP,INSERT LENS  6/25/2009    Right eye     INSERT PORT VASCULAR ACCESS Right 10/7/2016    Procedure: INSERT PORT VASCULAR ACCESS;  Surgeon: Keyon Blanco MD;  Location: [facility] OR     MASTECTOMY SIMPLE BILATERAL Bilateral 2/8/2017    Procedure: MASTECTOMY SIMPLE BILATERAL;  Surgeon: Keyon Blanco MD;  Location: [facility] OR     OPEN REDUCTION INTERNAL FIXATION FOOT Right 3/20/2015    Procedure: OPEN REDUCTION INTERNAL FIXATION FOOT;  Surgeon: Javi Herrmann DPM;  Location: [facility] OR     REMOVE PORT VASCULAR ACCESS Right 2/14/2018    Procedure: REMOVE PORT VASCULAR ACCESS;  right intra jugular port removal;  Surgeon: Keyon Blanco MD;   Location: [facility] OR     SHOULDER SURGERY Right 11/2015     Patient Active Problem List   Diagnosis     Enthesopathy of hip region     Family history of stroke (cerebrovascular)     Trochanteric bursitis     Advanced directives, counseling/discussion     Health Care Home     Hypertension goal BP (blood pressure) < 140/90     Baker's cyst of knee     Patella-femoral syndrome     Adjustment disorder with depressed mood     Essential hypertension     Malignant neoplasm of upper-outer quadrant of left female breast (H)     Encounter for antineoplastic chemotherapy     S/P bilateral mastectomy     Anxiety     Hyperlipidemia LDL goal <130     S/P reverse total shoulder arthroplasty, right     Prophylactic antibiotic for dental procedure indicated due to prior joint replacement     Chronic pain syndrome     Lumbar radiculopathy     Foraminal stenosis of lumbar region     Central stenosis of spinal canal     DDD (degenerative disc disease), lumbar     Current Outpatient Prescriptions   Medication     ALPRAZolam (XANAX) 0.5 MG tablet     atorvastatin (LIPITOR) 20 MG tablet     FLUoxetine (PROZAC) 40 MG capsule     hydrochlorothiazide (HYDRODIURIL) 25 MG tablet     oxybutynin (DITROPAN) 5 MG tablet     oxyCODONE-acetaminophen (PERCOCET) 5-325 MG per tablet     prochlorperazine (COMPAZINE) 10 MG tablet     acetaminophen (TYLENOL EX ST ARTHRITIS PAIN) 500 MG tablet     No current facility-administered medications for this visit.          O: /77 (BP Location: Right arm, Patient Position: Chair, Cuff Size: Adult Regular)  Pulse 77  Temp 98.6  F (37  C) (Tympanic)  SpO2 96%     The patient appears comfortable and in no apparent distress.  Afebrile.  Back: no CVA or flank tenderness.  Abdomen: soft, positive for bowel sounds, some  supra pubic tenderness.  Neck: supple, no adenopathy, and thyroid normal size, non-tender, without nodularity.  Lungs -clear    See lab results.    A:    Dysuria  Acute cystitis with hematuria    P:  "Prescriptions as below. Discussed indications, dosing, side affects and adverse reactions of medications with  Patient -Macrobid.  Take a probiotic or eat yogurt while on antibiotics.  Drink plenty of fluids.    Prevention and treatment of UTI's discussed.  Signs and symptoms of pyelonephritis mentioned.    Urine culture pending. Pt will be contacted if change in treatment plan is indicated.  If symptoms do not improve in a few days or if at anytime she is worse she will follow up with her primary care provider.  AVS given and discussed:    Patient Instructions      * BLADDER INFECTION,Female (Adult)    A bladder infection (\"cystitis\" or \"UTI\") usually causes a constant urge to urinate and a burning when passing urine. Urine may be cloudy, smelly or dark. There may be pain in the lower abdomen. A bladder infection occurs when bacteria from the vaginal area enter the bladder opening (urethra). This can occur from sexual intercourse, wearing tight clothing, dehydration and other factors.  HOME CARE:  1. Drink lots of fluids (at least 6-8 glasses a day, unless you must restrict fluids for other medical reasons). This will force the medicine into your urinary system and flush the bacteria out of your body. Cranberry juice has been shown to help clear out the bacteria.  2. Avoid sexual intercourse until your symptoms are gone.  3. A bladder infection is treated with antibiotics. You may also be given Pyridium (generic = phenazopyridine) to reduce the burning sensation. This medicine will cause your urine to become a bright orange color. The orange urine may stain clothing. You may wear a pad or panty-liner to protect clothing.  PREVENTING FUTURE INFECTIONS:  1. Always wipe from front to back after a bowel movement.  2. Keep the genital area clean and dry.  3. Drink plenty of fluids each day to avoid dehydration.  4. Urinate right after intercourse to flush out the bladder.  5. Wear cotton underwear and cotton-lined panty " hose; avoid tight-fitting pants.  6. If you are on birth control pills and are having frequent bladder infections, discuss with your doctor.  FOLLOW UP: Return to this facility or see your doctor if ALL symptoms are not gone after three days of treatment.  GET PROMPT MEDICAL ATTENTION if any of the following occur:    Fever over 101 F (38.3 C)    No improvement by the third day of treatment    Increasing back or abdominal pain    Repeated vomiting; unable to keep medicine down    Weakness, dizziness or fainting    Vaginal discharge    Pain, redness or swelling in the labia (outer vaginal area)    0649-9112 [employer]. [address] [place] [address]. All rights reserved. This information is not intended as a substitute for professional medical care. Always follow your healthcare professional's instructions.  This information has been modified by your health care provider with permission from the publisher.      ...................  We will contact you if the urine culture indicates a change in treatment plan.    Please FOLLOW UP at primary care clinic if not improving, new symptoms, worse or this does not resolve.  [facility]  [phone]  [facility]  [phone]        PT is comfortable with this plan.  Electronically signed,  MICHELLE Persaud, PAC

## 2018-06-15 NOTE — TELEPHONE ENCOUNTER
Spoke with patient informed her she needs an appointment, patient states [facility] is at [facility] now  Closing encounter  Moira Castro RT (R)

## 2018-06-15 NOTE — TELEPHONE ENCOUNTER
Pt calling back, know's JDH is out of office, wondering if someone else could give her an antiobiotic. Pleae advise.

## 2018-06-16 ENCOUNTER — MYC MEDICAL ADVICE (OUTPATIENT)
Dept: FAMILY MEDICINE | Facility: OTHER | Age: 68
End: 2018-06-16

## 2018-06-16 DIAGNOSIS — N39.0 FREQUENT UTI: Primary | ICD-10-CM

## 2018-06-16 LAB
BACTERIA SPEC CULT: NO GROWTH
Lab: NORMAL
SPECIMEN SOURCE: NORMAL

## 2018-06-17 ENCOUNTER — TELEPHONE (OUTPATIENT)
Dept: URGENT CARE | Facility: RETAIL CLINIC | Age: 68
End: 2018-06-17

## 2018-06-17 NOTE — TELEPHONE ENCOUNTER
Please contact patient. There was no growth on her UC - she should stop the antibiotic and FOLLOW UP with PCP if ongoing symptoms  MICHELLE Persaud, PAC

## 2018-07-02 ENCOUNTER — TELEPHONE (OUTPATIENT)
Dept: FAMILY MEDICINE | Facility: OTHER | Age: 68
End: 2018-07-02

## 2018-07-16 ENCOUNTER — TRANSFERRED RECORDS (OUTPATIENT)
Dept: HEALTH INFORMATION MANAGEMENT | Facility: CLINIC | Age: 68
End: 2018-07-16

## 2018-07-20 ENCOUNTER — MYC MEDICAL ADVICE (OUTPATIENT)
Dept: FAMILY MEDICINE | Facility: OTHER | Age: 68
End: 2018-07-20

## 2018-07-31 ENCOUNTER — TELEPHONE (OUTPATIENT)
Dept: PALLIATIVE MEDICINE | Facility: CLINIC | Age: 68
End: 2018-07-31

## 2018-07-31 ENCOUNTER — TELEPHONE (OUTPATIENT)
Dept: NEUROSURGERY | Facility: CLINIC | Age: 68
End: 2018-07-31

## 2018-07-31 DIAGNOSIS — M54.16 LUMBAR RADICULOPATHY: Primary | ICD-10-CM

## 2018-07-31 NOTE — TELEPHONE ENCOUNTER
Spoke with patient. She is requesting to repeat LESI that was done in Feb 2018. This provided approximately 5 months of pain relief, but the pain in low back and left leg has recently returned. These are the same symptoms as when she saw Dr. Gomes. She would like to stay with [facility] if possible. Will pend order to Dr. Gomes for approval to repeat injection.      Advised to call back with any further questions or concerns.

## 2018-07-31 NOTE — TELEPHONE ENCOUNTER
Reason for Call:  Other call back    Detailed comments: please call Michaela she would like to know if Dr. Gomes could place an order for her to have another back inj. , please call pt to advise. Thank You America      Phone Number Patient can be reached at: Home number on file [phone] (home)    Best Time: any     Can we leave a detailed message on this number? YES    Call taken on 7/31/2018 at 9:40 AM by America Lama

## 2018-07-31 NOTE — TELEPHONE ENCOUNTER
Pre-screening Questions for Radiology Injections:    Injection to be done at which interventional clinic site? [facility] [place]    Instruct patient to arrive as directed prior to the scheduled appointment time:    [place] minutes before      [place]: 1 hour before     Procedure ordered by SHABNAM    Procedure ordered? Lumbar Epidural Steroid Injection    What insurance would patient like us to bill for this procedure? BCBS      Worker's comp or MVA (motor vehicle accident) -Any injection DO NOT SCHEDULE and route to Josefa Worley.      [employer] - For SI joint injections, DO NOT SCHEDULE and route Nesha Pink. [employer] NO PA REQUIRED EFFECTIVE 2017      [employer]- MBB's must be scheduled at LEAST two weeks apart      [employer] - Any injection besides hip/shoulder/knee joint DO NOT SCHEDULE and route to Nesha Pink. She will obtain PA and call pt back to schedule procedure or notify pt of denial.       [employer]-Route to Nesha for review    Any chance of pregnancy? NO   If YES, do NOT schedule and route to RN pool    Is an  needed? No     Patient has a drive home? (mandatory) YES:     Is patient taking any blood thinners (plavix, coumadin, jantoven, warfarin, heparin, pradaxa or dabigatran )? No   If hold needed, do NOT schedule, route to RN pool     Is patient taking any aspirin products? No     If more than 325mg/day do NOT schedule; route to RN pool     For CERVICAL procedures, hold all aspirin products for 6 days.      Does the patient have a bleeding or clotting disorder? No     If YES, okay to schedule AND route to RN nurse pool    **For any patients with platelet count <100, must be forwarded to provider**    Is patient diabetic?  No  If YES, have them bring their glucometer.    Does patient have an active infection or treated for one within the past week? No     Is patient currently taking any antibiotics?  No     For patients on chronic,  preventative, or prophylactic antibiotics, procedures may be scheduled.     For patients on antibiotics for active or recent infection:    Lucy Mcdonald Burton, Snitzer-antibiotic course must have been completed for 4 days    Is patient currently taking any steroid medications? (i.e. Prednisone, Medrol)  No     For patients on steroid medications:    Lucy Mcdonald Burton, [place]itzer-steroid course must have been completed for 4 days    Reviewed with patient:  If you are started on any steroids or antibiotics between now and your appointment, you must contact us because it may affect our ability to perform your procedure.  Yes    Is patient actively being treated for cancer or immunocompromised? No  If YES, do NOT schedule and route to RN pool     Are you able to get on and off an exam table with minimal or no assistance? Yes  If NO, do NOT schedule and route to RN pool    Are you able to roll over and lay on your stomach with minimal or no assistance? Yes  If NO, do NOT schedule and route to RN pool     Any allergies to contrast dye, iodine, shellfish, or numbing and steroid medications? No  If YES, route to RN pool AND add allergy information to appointment notes    Allergies: No known drug allergies      Has the patient had a flu shot or any other vaccinations within 7 days before or after the procedure.  No     Does patient have an MRI/CT?  YES:   (SI joint, hip injections, lumbar sympathetic blocks, and stellate ganglion blocks do not require an MRI)    Was the MRI done w/in the last 3 years?  Yes    Was MRI done at [facility]? Yes      If not, where was it done? N/A       If MRI was not done at [facility], [facility] or [facility] do NOT schedule and route to nursing.  If pt has an imaging disc, the injection may be scheduled but pt has to bring disc to appt. If they show up w/out disc the injection cannot be done    Reminders (please tell patient if applicable):       Instructed pt to arrive 30  minutes early for IV start if this is for a cervical procedure, ALL sympathetic (stellate ganglion, hypogastric, or lumbar sympathetic block) and all sedation procedures (RFA, spinal cord stimulation trials).  Not Applicable   -IVs are not routinely placed for Dr. Downing cervical cases   -Dr. Menendez: IVs for cervical ESIs and cervical TBDs (not CMBBs/facet inj)      If NPO for sedation, informed patient that it is okay to take medications with sips of water (except if they are to hold blood thinners).  Not Applicable   *DO take blood pressure medication if it is prescribed*      If this is for a cervical VIANNEY, informed patient that aspirin needs to be held for 6 days.   Not Applicable      For all patients not having spinal cord stimulator (SCS) trials or radiofrequency ablations (RFAs), informed patient:    IV sedation is not provided for this procedure.  If you feel that an oral anti-anxiety medication is needed, you can discuss this further with your referring provider or primary care provider.  The Pain Clinic provider will discuss specifics of what the procedure includes at your appointment.  Most procedures last 10-20 minutes.  We use numbing medications to help with any discomfort during the procedure.  Not Applicable      Do not schedule procedures requiring IV placement in the first appointment of the day or first appointment after lunch. Do NOT schedule at 0745, 0815 or 1245.       For patients 85 or older we recommend having an adult stay w/ them for the remainder of the day.       Does the patient have any questions?    Nesha Pink  [facility]

## 2018-08-02 ENCOUNTER — RADIANT APPOINTMENT (OUTPATIENT)
Dept: RADIOLOGY | Facility: CLINIC | Age: 68
End: 2018-08-02
Attending: PSYCHIATRY & NEUROLOGY
Payer: COMMERCIAL

## 2018-08-02 ENCOUNTER — RADIOLOGY INJECTION OFFICE VISIT (OUTPATIENT)
Dept: PALLIATIVE MEDICINE | Facility: CLINIC | Age: 68
End: 2018-08-02
Payer: COMMERCIAL

## 2018-08-02 VITALS
RESPIRATION RATE: 16 BRPM | OXYGEN SATURATION: 95 % | SYSTOLIC BLOOD PRESSURE: 112 MMHG | HEART RATE: 67 BPM | DIASTOLIC BLOOD PRESSURE: 76 MMHG

## 2018-08-02 DIAGNOSIS — M54.16 LUMBAR RADICULOPATHY: ICD-10-CM

## 2018-08-02 DIAGNOSIS — M54.16 LUMBAR RADICULOPATHY: Primary | ICD-10-CM

## 2018-08-02 PROCEDURE — 64483 NJX AA&/STRD TFRM EPI L/S 1: CPT | Mod: LT | Performed by: PSYCHIATRY & NEUROLOGY

## 2018-08-02 ASSESSMENT — PAIN SCALES - GENERAL: PAINLEVEL: MODERATE PAIN (4)

## 2018-08-02 NOTE — PATIENT INSTRUCTIONS
[facility]   Procedure Discharge Instructions    Today you saw:  Dr. Monika Ayala     You had an:  Lumbar Epidural steroid injection       Medications used:  Lidocaine   Bupivacaine   Dexamethasone Omnipaque Ropivacaine  Normal saline            Be cautious when walking. Numbness and/or weakness in the lower extremities may occur for up to 6-8 hours after the procedure due to effect of the local anesthetic    Do not drive for 6 hours. The effect of the local anesthetic could slow your reflexes.     You may resume your regular activities after 24 hours    Avoid strenuous activity for the first 24 hours    You may shower, however avoid swimming, tub baths or hot tubs for 24 hours following your procedure    You may have a mild to moderate increase in pain for several days following the injection.    It may take up to 14 days for the steroid medication to start working although you may feel the effect as early as a few days after the procedure.       You may use ice packs for 10-15 minutes, 3 to 4 times a day at the injection site for comfort    Do not use heat to painful areas for 6 to 8 hours. This will give the local anesthetic time to wear off and prevent you from accidentally burning your skin.     You may use anti-inflammatory medications (such as Ibuprofen or Aleve or Advil) or Tylenol for pain control if necessary    If you experience any of the following, call the Pain Clinic during work hours at [phone] or the Provider Line after hours at [phone]:  -Fever over 100 degree F  -Swelling, bleeding, redness, drainage, warmth at the injection site  -Progressive weakness or numbness in your legs  -Loss of bowel or bladder function  -Unusual new onset of pain that is not improving

## 2018-08-02 NOTE — NURSING NOTE
Pre-procedure Intake    Have you been fasting? NA    If yes, for how long? No     Are you taking a prescribed blood thinner such as coumadin, Plavix, Xarelto?    No    If yes, when did you take your last dose? No     Do you take aspirin?  No    If cervical procedure, have you held aspirin for 6 days?   NA    Do you have any allergies to contrast dye, iodine, steroid and/or numbing medications?  NO    Are you currently taking antibiotics or have an active infection?  NO    Have you had a fever/elevated temperature within the past week? NO    Are you currently taking oral steroids? NO    Do you have a ? Yes       Are you pregnant or breastfeeding?  NO    Are the vital signs normal?  Yes    Constance Landa MA

## 2018-08-02 NOTE — NURSING NOTE
Discharge Information    IV Discontiued Time:  NA    Amount of Fluid Infused:  NA    Discharge Criteria = When patient returns to baseline or as per MD order    Consciousness:  Pt is fully awake    Circulation:  BP +/- 20% of pre-procedure level    Respiration:  Patient is able to breathe deeply    O2 Sat:  Patient is able to maintain O2 Sat >92% on room air    Activity:  Moves 4 extremities on command    Ambulation:  Patient is able to stand and walk or stand and pivot into wheelchair    Dressing:  Clean/dry or No Dressing    Notes:   Discharge instructions and AVS given to patient    Patient meets criteria for discharge?  YES    Admitted to PCU?  No    Responsible adult present to accompany patient home?  Yes    Signature/Title:    Javi Santos RN Care Coordinator  [facility]

## 2018-08-02 NOTE — MR AVS SNAPSHOT
After Visit Summary   8/2/2018    Michaela Dorman    MRN: [id number]           Patient Information     Date Of Birth          1950        Visit Information        Provider Department      8/2/2018 9:45 AM Monika Ayala MD [facility] [place]        Care Instructions    [facility]   Procedure Discharge Instructions    Today you saw:  Dr. Monika Ayala     You had an:  Lumbar Epidural steroid injection       Medications used:  Lidocaine   Bupivacaine   Dexamethasone Omnipaque Normal saline            Be cautious when walking. Numbness and/or weakness in the lower extremities may occur for up to 6-8 hours after the procedure due to effect of the local anesthetic    Do not drive for 6 hours. The effect of the local anesthetic could slow your reflexes.     You may resume your regular activities after 24 hours    Avoid strenuous activity for the first 24 hours    You may shower, however avoid swimming, tub baths or hot tubs for 24 hours following your procedure    You may have a mild to moderate increase in pain for several days following the injection.    It may take up to 14 days for the steroid medication to start working although you may feel the effect as early as a few days after the procedure.       You may use ice packs for 10-15 minutes, 3 to 4 times a day at the injection site for comfort    Do not use heat to painful areas for 6 to 8 hours. This will give the local anesthetic time to wear off and prevent you from accidentally burning your skin.     You may use anti-inflammatory medications (such as Ibuprofen or Aleve or Advil) or Tylenol for pain control if necessary    If you experience any of the following, call the Pain Clinic during work hours at [phone] or the Provider Line after hours at [phone]:  -Fever over 100 degree F  -Swelling, bleeding, redness, drainage, warmth at the injection site  -Progressive weakness or numbness in your  legs  -Loss of bowel or bladder function  -Unusual new onset of pain that is not improving                Follow-ups after your visit        Your next 10 appointments already scheduled     Nov 15, 2018 10:00 AM CST   LAB with [facility] LAB [facility] ([address] [address]   [phone]           Please do not eat 10-12 hours before your appointment if you are coming in fasting for labs on lipids, cholesterol, or glucose (sugar). This does not apply to pregnant women. Water, hot tea and black coffee (with nothing added) are okay. Do not drink other fluids, diet soda or chew gum.            Nov 20, 2018 10:00 AM CST   Return Visit with Syeda Ferguson MD   [facility] ([address] [address]   [phone]            Nov 20, 2018 12:45 PM CST   [facility] Visit with Loren Felipe MD   [facility]    [address] [place] [id number] [phone]              Who to contact     If you have questions or need follow up information about today's clinic visit or your schedule please contact [facility] directly at [phone].  Normal or non-critical lab and imaging results will be communicated to you by MyChart, letter or phone within 4 business days after the clinic has received the results. If you do not hear from us within 7 days, please contact the clinic through MyChart or phone. If you have a critical or abnormal lab result, we will notify you by phone as soon as possible.  Submit refill requests through [employer] or call your pharmacy and they will forward the refill request to us. Please allow 3 business days for your refill to be completed.          Additional Information About Your Visit        [employer] Information     [employer] gives you secure access to your electronic health record. If you see a primary  care provider, you can also send messages to your care team and make appointments. If you have questions, please call your primary care clinic.  If you do not have a primary care provider, please call [phone] and they will assist you.        Care EveryWhere ID     This is your Care EveryWhere ID. This could be used by other organizations to access your [place] medical records  [id number]        Your Vitals Were     Pulse                   80            Blood Pressure from Last 3 Encounters:   08/02/18 146/75   06/15/18 131/77   06/05/18 126/64    Weight from Last 3 Encounters:   06/05/18 65.1 kg (143 lb 9.6 oz)   05/15/18 63.5 kg (140 lb)   02/26/18 63 kg (139 lb)              Today, you had the following     No orders found for display       Primary Care Provider Office Phone # Fax #    Phani Tapia PA-C [phone] [address] [facility] [address]        Equal Access to Services     SHIVANI ARTIS : Hadii aad ku hadasho Soomaali, waaxda luqadaha, qaybta kaalmada adeegyada, waxay idiin hayaan darianeg kharaethan lazhanna . So [facility] [phone].    ATENCIÓN: Si habla español, tiene a ordoñez disposición servicios gratuitos de asistencia lingüística. [facility] [phone].    We comply with applicable federal civil rights laws and [place] laws. We do not discriminate on the basis of race, color, national origin, age, disability, sex, sexual orientation, or gender identity.            Thank you!     Thank you for choosing [facility]  for your care. Our goal is always to provide you with excellent care. Hearing back from our patients is one way we can continue to improve our services. Please take a few minutes to complete the written survey that you may receive in the mail after your visit with us. Thank you!             Your Updated Medication List - Protect others around you: Learn how to safely use, store and throw away your medicines at www.disposemymeds.org.          This list  is accurate as of 8/2/18  9:53 AM.  Always use your most recent med list.                   Brand Name Dispense Instructions for use Diagnosis    ALPRAZolam 0.5 MG tablet    XANAX    30 tablet    Take 1 tablet (0.5 mg) by mouth 3 times daily as needed for anxiety    Anxiety       atorvastatin 20 MG tablet    LIPITOR    90 tablet    Take 1 tablet (20 mg) by mouth daily    Hyperlipidemia LDL goal <130       FLUoxetine 40 MG capsule    PROzac    90 capsule    Take 1 capsule (40 mg) by mouth daily    Adjustment disorder with depressed mood       hydrochlorothiazide 25 MG tablet    HYDRODIURIL    90 tablet    Take 1 tablet (25 mg) by mouth daily    Essential hypertension       oxybutynin 5 MG tablet    DITROPAN    90 tablet    TAKE ONE-HALF TABLET BY MOUTH TWICE A DAY    Dysuria       oxyCODONE-acetaminophen 5-325 MG per tablet    PERCOCET    18 tablet    Take 1-2 tablets by mouth every 6 hours as needed for moderate to severe pain    Chronic pain syndrome, DDD (degenerative disc disease), lumbar       prochlorperazine 10 MG tablet    COMPAZINE    30 tablet    Take 1 tablet (10 mg) by mouth every 6 hours as needed (Nausea/Vomiting)    Encounter for antineoplastic chemotherapy       TYLENOL EX ST ARTHRITIS PAIN 500 MG tablet   Generic drug:  acetaminophen      Take 500-1,000 mg by mouth every 6 hours as needed for mild pain    Dermatitis due to sun

## 2018-08-02 NOTE — PROGRESS NOTES
Pre procedure Diagnosis: lumbar radiculopathy    Post procedure Diagnosis: Same  Procedure performed: left L5-S1 transforaminal epidural steroid injection   Anesthesia: none  Complications: none  Operators: Mallika Ayala MD and Laney Chapman MD (pain fellow)    Indications:   Michaela Dorman is a 67 year old female was sent by Dr. Gomes for lumbar radiculopathy.  They have a history of low back pain radiating down left leg posteriorly. The exam shows tenderness at the low back, buttock area, and they have tried conservative treatment including physical therapy and medications.    MRI was done on 11/30/2017 which showed     IMPRESSION:   1. Interval development of degenerative disc disease at L1-L2 with  minimal left foraminal stenosis.  2. Interval development of advanced degenerative disc disease at L2-L3  with mild central stenosis, moderate right foraminal stenosis and mild  left foraminal stenosis.  3. Mild central stenosis at L3-L4 related to multiple factors. Mild to  moderate right and minimal left foraminal stenosis at this level and  no change since the prior exam.  4. Borderline-mild central stenosis at L4-L5 related multiple factors.  Mild right and moderate left foraminal stenosis at this level and no  change since the prior exam.  5. Progressive advanced degenerative disc disease L5-S1 with right S1  lateral recess stenosis and increasing severe right and moderate left  foraminal stenosis.  Options/alternatives, benefits and risks were discussed with the patient including bleeding, infection, tissue trauma, numbness, weakness, paralysis, spinal cord injury, radiation exposure, headache and reaction to medications. Questions were answered to her satisfaction and she agrees to proceed. Voluntary informed consent was obtained and signed.     Vitals were reviewed: Yes  Allergies were reviewed:  Yes   Medications were reviewed:  Yes   Pre-procedure pain score: 4/10    Procedure:  After getting informed  consent, patient was brought into the procedure suite and was placed in a prone position on the procedure table.   A Pause for the Cause was performed.  Patient was prepped and draped in sterile fashion.     After identifying the left L5-S1 neuroforamen, the C-arm was rotated to a left lateral oblique angle.  A total of 3ml of Lidocaine 1% was used to anesthetize the skin and the needle track at a skin entry site coaxial with the fluoroscopy beam, and overriding the superior aspect of the neuroforamen.  A 22 gauge 5 inch spinal needle was advanced under intermittent fluoroscopy until it entered the foramen superiorly.    The position was then inspected from anteroposterior and lateral views, and the needle adjusted appropriately.  A total of 2ml of Omnipaque-300 was injected, confirming appropriate position, with spread into the nerve root sheath and the epidural space, with no intravascular uptake. 8ml was wasted    1.5ml of 0.5% bupivacaine with 10mg of dexamethasone was injected.  The needle was flushed with lidocaine and removed.    During the procedure, there was not a paresthesia.   Hemostasis was achieved, the area was cleaned, and bandaids were placed when appropriate.  The patient tolerated the procedure well, and was taken to the recovery room.    Images were saved to PACS.    Post-procedure pain score: 4/10  Follow-up includes:   -f/u phone call in one week  -f/u with referring provider    Mallika Ayala MD  [facility]

## 2018-08-10 ENCOUNTER — TELEPHONE (OUTPATIENT)
Dept: PALLIATIVE MEDICINE | Facility: CLINIC | Age: 68
End: 2018-08-10

## 2018-08-10 ENCOUNTER — MYC MEDICAL ADVICE (OUTPATIENT)
Dept: FAMILY MEDICINE | Facility: OTHER | Age: 68
End: 2018-08-10

## 2018-08-10 DIAGNOSIS — M51.369 DDD (DEGENERATIVE DISC DISEASE), LUMBAR: Primary | ICD-10-CM

## 2018-08-10 RX ORDER — OXYCODONE AND ACETAMINOPHEN 5; 325 MG/1; MG/1
1 TABLET ORAL EVERY 6 HOURS PRN
Qty: 5 TABLET | Refills: 0 | Status: SHIPPED | OUTPATIENT
Start: 2018-08-10 | End: 2018-09-07

## 2018-08-10 NOTE — TELEPHONE ENCOUNTER
Patient had a left L5-S1 transforaminal epidural steroid injection  on 8/3/18.  Called patient for an update.      Left message that we were calling for an update about how she was doing after the injection.  LM that if she has any problems or questions to call the clinic at [phone].

## 2018-08-10 NOTE — TELEPHONE ENCOUNTER
Left message for pt to return call, when call is returned give information below. I also sent a mychart message. Rx walked to the .    Alix Jackson CMA ([facility])

## 2018-08-10 NOTE — TELEPHONE ENCOUNTER
Refill given for Percocet 5/325 mg short course 5 tabs. To cover until [facility] has returned.  reviewed.   Maude Mederos CNP

## 2018-08-15 DIAGNOSIS — Z51.11 ENCOUNTER FOR ANTINEOPLASTIC CHEMOTHERAPY: ICD-10-CM

## 2018-08-16 RX ORDER — PROCHLORPERAZINE MALEATE 10 MG
TABLET ORAL
Qty: 30 TABLET | Refills: 3 | Status: SHIPPED | OUTPATIENT
Start: 2018-08-16 | End: 2019-02-03

## 2018-08-16 NOTE — TELEPHONE ENCOUNTER
Prochlorperazine    Prescription approved per [facility] Refill Protocol.    Natasha Alonzo, RN, BSN

## 2018-09-04 DIAGNOSIS — E78.5 HYPERLIPIDEMIA LDL GOAL <130: ICD-10-CM

## 2018-09-05 RX ORDER — ATORVASTATIN CALCIUM 20 MG/1
TABLET, FILM COATED ORAL
Qty: 30 TABLET | Refills: 0 | Status: SHIPPED | OUTPATIENT
Start: 2018-09-05 | End: 2019-09-09

## 2018-09-05 NOTE — TELEPHONE ENCOUNTER
Patient informed.  She will call the [facility] lab to schedule fasting lab work.  Paulina Alberto, CMA

## 2018-09-05 NOTE — TELEPHONE ENCOUNTER
"Requested Prescriptions   Pending Prescriptions Disp Refills     atorvastatin (LIPITOR) 20 MG tablet [Pharmacy Med Name: ATORVASTATIN CALCIUM 20MG TABS] 90 tablet 1     Sig: TAKE ONE TABLET BY MOUTH EVERY DAY    Statins Protocol Passed    9/5/2018 11:53 AM       Passed - LDL on file in past 12 months    Recent Labs   Lab Test  09/18/17   0840   LDL  170*            Passed - No abnormal creatine kinase in past 12 months    No lab results found.            Passed - Recent (12 mo) or future (30 days) visit within the authorizing provider's specialty    Patient had office visit in the last 12 months or has a visit in the next 30 days with authorizing provider or within the authorizing provider's specialty.  See \"Patient Info\" tab in inbasket, or \"Choose Columns\" in Meds & Orders section of the refill encounter.           Passed - Patient is age 18 or older       Passed - No active pregnancy on record       Passed - No positive pregnancy test in past 12 months        Last ov 06/05/2018    Last refilled 03/26/18    Medication is being filled for 1 time refill only due to:  needs fasting labs. Please call and help schedule.      Larry Meadows, RN, BSN          "

## 2018-09-07 ENCOUNTER — OFFICE VISIT (OUTPATIENT)
Dept: NEUROSURGERY | Facility: CLINIC | Age: 68
End: 2018-09-07
Payer: COMMERCIAL

## 2018-09-07 VITALS
HEIGHT: 66 IN | SYSTOLIC BLOOD PRESSURE: 130 MMHG | TEMPERATURE: 98 F | DIASTOLIC BLOOD PRESSURE: 58 MMHG | BODY MASS INDEX: 22.68 KG/M2 | WEIGHT: 141.1 LBS

## 2018-09-07 DIAGNOSIS — M54.42 CHRONIC BILATERAL LOW BACK PAIN WITH BILATERAL SCIATICA: Primary | ICD-10-CM

## 2018-09-07 DIAGNOSIS — G89.29 CHRONIC BILATERAL LOW BACK PAIN WITH BILATERAL SCIATICA: Primary | ICD-10-CM

## 2018-09-07 DIAGNOSIS — M54.41 CHRONIC BILATERAL LOW BACK PAIN WITH BILATERAL SCIATICA: Primary | ICD-10-CM

## 2018-09-07 PROCEDURE — 99213 OFFICE O/P EST LOW 20 MIN: CPT | Performed by: PHYSICIAN ASSISTANT

## 2018-09-07 ASSESSMENT — PAIN SCALES - GENERAL: PAINLEVEL: MILD PAIN (3)

## 2018-09-07 NOTE — LETTER
9/7/2018         RE: Michaela Dorman  [address]  [facility] [address]        Dear Colleague,    Thank you for referring your patient, Michaela Dorman, to the [facility]. Please see a copy of my visit note below.    [facility]  Neurosurgery followup:    HPI: 67-year-old female, with ongoing low back and bilateral radicular leg pain.  She has had 3 epidural injections now.  The first and third injections were not helpful, but the second 1 did provide her with some good symptomatic relief.  Her symptoms have now returned.  She did complete physical therapy before she initially saw our practice, but has not had any since.  Her pain radiates down the lateral aspect of her thighs and calves, as far as the ankles.  Exam:  Constitutional:  Alert, well nourished, NAD.  HEENT: Normocephalic, atraumatic.   Pulm:  Without shortness of breath   CV:  No pitting edema of BLE.      Neurological:  Awake  Alert  Oriented x 3  Motor exam:        IP Q DF PF EHL  R   5  5   5   5    5  L   5  5   5   5    5     Reflexes are 2+ in the patellar and Achilles. There is no clonus. Downgoing Babinski.      Able to spontaneously move L/E bilaterally  Sensation intact throughout all L/E dermatomes     Imaging: Review of her lumbar MRI demonstrates severe disc degeneration at L5-S1 with reactive endplate changes and bilateral foraminal stenosis.    A/P:  Low back pain  Radicular leg pain  L5-S1 disc degeneration with bilateral foraminal stenosis  I did have a discussion with the patient regarding her symptoms.  She has had multiple epidural injections with varying levels of symptomatic improvement.  Her MRI shows a lot of disc degeneration and collapse at L5-S1, with bilateral foraminal stenosis which appears to be worse on the right.  I will re-initiate physical therapy for her, and follow-up with Dr. Gomes after she has had a chance to do some of the therapy.  She voiced agreement and  understanding.        Venkat Shipley PA-C  [facility]  [address] [address]    Tel [phone]  Pager [phone]      Again, thank you for allowing me to participate in the care of your patient.        Sincerely,        Venkat Shipley PA-C

## 2018-09-07 NOTE — NURSING NOTE
"Michaela Dorman is a 67 year old female who presents for:  Chief Complaint   Patient presents with     Neurologic Problem     lumbar pain        Initial Vitals:  /58 (BP Location: Right arm, Patient Position: Chair, Cuff Size: Adult Regular)  Temp 98  F (36.7  C) (Temporal)  Ht 5' 6\" (1.676 m)  Wt 141 lb 1.6 oz (64 kg)  BMI 22.77 kg/m2 Estimated body mass index is 22.77 kg/(m^2) as calculated from the following:    Height as of this encounter: 5' 6\" (1.676 m).    Weight as of this encounter: 141 lb 1.6 oz (64 kg).. Body surface area is 1.73 meters squared. BP completed using cuff size: regular  Mild Pain (3)    Do you feel safe in your environment?  Yes  Do you need any refills today? No    Nursing Comments:         Serena Gonzalez CMA    "

## 2018-09-07 NOTE — PROGRESS NOTES
[facility]  Neurosurgery followup:    HPI: 67-year-old female, with ongoing low back and bilateral radicular leg pain.  She has had 3 epidural injections now.  The first and third injections were not helpful, but the second 1 did provide her with some good symptomatic relief.  Her symptoms have now returned.  She did complete physical therapy before she initially saw our practice, but has not had any since.  Her pain radiates down the lateral aspect of her thighs and calves, as far as the ankles.  Exam:  Constitutional:  Alert, well nourished, NAD.  HEENT: Normocephalic, atraumatic.   Pulm:  Without shortness of breath   CV:  No pitting edema of BLE.      Neurological:  Awake  Alert  Oriented x 3  Motor exam:        IP Q DF PF EHL  R   5  5   5   5    5  L   5  5   5   5    5     Reflexes are 2+ in the patellar and Achilles. There is no clonus. Downgoing Babinski.      Able to spontaneously move L/E bilaterally  Sensation intact throughout all L/E dermatomes     Imaging: Review of her lumbar MRI demonstrates severe disc degeneration at L5-S1 with reactive endplate changes and bilateral foraminal stenosis.    A/P:  Low back pain  Radicular leg pain  L5-S1 disc degeneration with bilateral foraminal stenosis  I did have a discussion with the patient regarding her symptoms.  She has had multiple epidural injections with varying levels of symptomatic improvement.  Her MRI shows a lot of disc degeneration and collapse at L5-S1, with bilateral foraminal stenosis which appears to be worse on the right.  I will re-initiate physical therapy for her, and follow-up with Dr. Gomes after she has had a chance to do some of the therapy.  She voiced agreement and understanding.        Venkat Shipley PA-C  [facility]  [address]  [address] [place] [address]    Tel [phone]  Pager [phone]

## 2018-09-07 NOTE — MR AVS SNAPSHOT
"              After Visit Summary   9/7/2018    Michaela Dorman    MRN: [id number]           Patient Information     Date Of Birth          1950        Visit Information        Provider Department      9/7/2018 1:30 PM Venkat Shipley PA-C [facility]        Today's Diagnoses     Chronic bilateral low back pain with bilateral sciatica    -  1       Follow-ups after your visit        Additional Services     PHYSICAL THERAPY REFERRAL       *This therapy referral will be filtered to a centralized scheduling office at [facility] and the patient will receive a call to schedule an appointment at a [facility] location most convenient for them. *     [facility] provides Physical Therapy evaluation and treatment and many specialty services across the [facility] system.  If requesting a specialty program, please choose from the list below.    If you have not heard from the scheduling office within 2 business days, please call [phone] for all locations, with the exception of [place], please call [phone] and [facility], please call [phone]  Treatment: Evaluation & Treatment  Special Instructions/Modalities: back pain   Special Programs: None    Please be aware that coverage of these services is subject to the terms and limitations of your health insurance plan.  Call member services at your health plan with any benefit or coverage questions.      **Note to Provider:  If you are referring outside of [facility] for the therapy appointment, please list the name of the location in the \"special instructions\" above, print the referral and give to the patient to schedule the appointment.                  Follow-up notes from your care team     Return in about 6 weeks (around 10/19/2018) for f/u with Dr. Gomes after PT.      Your next 10 appointments already scheduled     Sep 10, 2018  9:00 AM CDT   LAB with NL LAB [facility] ([facility] [place])    [address] [facility] [address]   [phone]           Please do not eat 10-12 hours before your appointment if you are coming in fasting for labs on lipids, cholesterol, or glucose (sugar). This does not apply to pregnant women. Water, hot tea and black coffee (with nothing added) are okay. Do not drink other fluids, diet soda or chew gum.            Sep 11, 2018  1:00 PM CDT   [facility] Visit with Yecenia Castle MD   [facility]    [address] [address] [place] [id number] [phone]            Nov 15, 2018 10:00 AM CST   LAB with NL LAB [facility] ([facility])    [address] [place] [id number] [phone]           Please do not eat 10-12 hours before your appointment if you are coming in fasting for labs on lipids, cholesterol, or glucose (sugar). This does not apply to pregnant women. Water, hot tea and black coffee (with nothing added) are okay. Do not drink other fluids, diet soda or chew gum.            Nov 20, 2018 10:00 AM CST   Return Visit with Syeda Ferguson MD   [facility] ([facility])    [address] [facility] [address] [phone]              Who to contact     If you have questions or need follow up information about today's clinic visit or your schedule please contact [facility] directly at [phone].  Normal or non-critical lab and imaging results will be communicated to you by MyChart, letter or phone within 4 business days after the clinic has received the results. If you do not hear from us within 7 days, please contact the clinic through MyChart or phone. If you have a critical or abnormal lab result, we will notify you by phone as soon as possible.  Submit refill requests through [facility] or call your pharmacy and they will forward the refill request to us. Please allow 3 business days for your  "refill to be completed.          Additional Information About Your Visit        bublhart Information     [employer] gives you secure access to your electronic health record. If you see a primary care provider, you can also send messages to your care team and make appointments. If you have questions, please call your primary care clinic.  If you do not have a primary care provider, please call [phone] and they will assist you.        Care EveryWhere ID     This is your Care EveryWhere ID. This could be used by other organizations to access your [facility] medical records  [id number]        Your Vitals Were     Temperature Height BMI (Body Mass Index)             98  F (36.7  C) (Temporal) 5' 6\" (1.676 m) 22.77 kg/m2          Blood Pressure from Last 3 Encounters:   09/07/18 130/58   08/02/18 112/76   06/15/18 131/77    Weight from Last 3 Encounters:   09/07/18 141 lb 1.6 oz (64 kg)   06/05/18 143 lb 9.6 oz (65.1 kg)   05/15/18 140 lb (63.5 kg)              We Performed the Following     PHYSICAL THERAPY REFERRAL        Primary Care Provider Office Phone # Fax #    Phani Tapia PA-C [phone] [address] [facility] [address]        Equal Access to Services     SHIVANI ARTIS AH: Hadii aad ku hadasho Soomaali, waaxda luqadaha, qaybta kaalmada adeegyada, waxay idiin hayholan al pena. So [facility] [phone].    ATENCIÓN: Si habla español, tiene a ordoñez disposición servicios gratuitos de asistencia lingüística. Llame al [phone].    We comply with applicable federal civil rights laws and [place] laws. We do not discriminate on the basis of race, color, national origin, age, disability, sex, sexual orientation, or gender identity.            Thank you!     Thank you for choosing [facility]  for your care. Our goal is always to provide you with excellent care. Hearing back from our patients is one way we can continue to improve our services. Please take a few minutes " to complete the written survey that you may receive in the mail after your visit with us. Thank you!             Your Updated Medication List - Protect others around you: Learn how to safely use, store and throw away your medicines at www.disposemymeds.org.          This list is accurate as of 9/7/18  1:59 PM.  Always use your most recent med list.                   Brand Name Dispense Instructions for use Diagnosis    ALPRAZolam 0.5 MG tablet    XANAX    30 tablet    Take 1 tablet (0.5 mg) by mouth 3 times daily as needed for anxiety    Anxiety       atorvastatin 20 MG tablet    LIPITOR    30 tablet    TAKE ONE TABLET BY MOUTH EVERY DAY    Hyperlipidemia LDL goal <130       FLUoxetine 40 MG capsule    PROzac    90 capsule    Take 1 capsule (40 mg) by mouth daily    Adjustment disorder with depressed mood       hydrochlorothiazide 25 MG tablet    HYDRODIURIL    90 tablet    Take 1 tablet (25 mg) by mouth daily    Essential hypertension       oxybutynin 5 MG tablet    DITROPAN    90 tablet    TAKE ONE-HALF TABLET BY MOUTH TWICE A DAY    Dysuria       oxyCODONE-acetaminophen 5-325 MG per tablet    PERCOCET    18 tablet    Take 1-2 tablets by mouth every 6 hours as needed for moderate to severe pain    Chronic pain syndrome, DDD (degenerative disc disease), lumbar       PREVACID PO      Take 40 mg by mouth        prochlorperazine 10 MG tablet    COMPAZINE    30 tablet    TAKE ONE TABLET BY MOUTH EVERY 6 HOURS AS NEEDED (NAUSEA/VOMITING)    Encounter for antineoplastic chemotherapy       TYLENOL EX ST ARTHRITIS PAIN 500 MG tablet   Generic drug:  acetaminophen      Take 500-1,000 mg by mouth every 6 hours as needed for mild pain    Dermatitis due to sun

## 2018-09-10 DIAGNOSIS — E78.5 HYPERLIPIDEMIA LDL GOAL <130: ICD-10-CM

## 2018-09-10 LAB
CHOLEST SERPL-MCNC: 216 MG/DL
HDLC SERPL-MCNC: 90 MG/DL
LDLC SERPL CALC-MCNC: 108 MG/DL
NONHDLC SERPL-MCNC: 126 MG/DL
TRIGL SERPL-MCNC: 89 MG/DL

## 2018-09-10 PROCEDURE — 80061 LIPID PANEL: CPT | Performed by: PHYSICIAN ASSISTANT

## 2018-09-10 PROCEDURE — 36415 COLL VENOUS BLD VENIPUNCTURE: CPT | Performed by: PHYSICIAN ASSISTANT

## 2018-09-11 ENCOUNTER — OFFICE VISIT (OUTPATIENT)
Dept: DERMATOLOGY | Facility: CLINIC | Age: 68
End: 2018-09-11
Payer: COMMERCIAL

## 2018-09-11 ENCOUNTER — TELEPHONE (OUTPATIENT)
Dept: DERMATOLOGY | Facility: CLINIC | Age: 68
End: 2018-09-11

## 2018-09-11 DIAGNOSIS — Z85.828 HISTORY OF NONMELANOMA SKIN CANCER: ICD-10-CM

## 2018-09-11 DIAGNOSIS — L57.0 AK (ACTINIC KERATOSIS): ICD-10-CM

## 2018-09-11 DIAGNOSIS — D48.5 NEOPLASM OF UNCERTAIN BEHAVIOR OF SKIN: Primary | ICD-10-CM

## 2018-09-11 DIAGNOSIS — L57.0 AK (ACTINIC KERATOSIS): Primary | ICD-10-CM

## 2018-09-11 DIAGNOSIS — L28.1 PRURIGO NODULARIS: ICD-10-CM
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
## 2018-09-11 PROCEDURE — 11100 HC BIOPSY SKIN/SUBQ/MUC MEM, SINGLE LESION: CPT | Performed by: DERMATOLOGY

## 2018-09-11 PROCEDURE — 88305 TISSUE EXAM BY PATHOLOGIST: CPT | Mod: TC | Performed by: DERMATOLOGY

## 2018-09-11 PROCEDURE — 99214 OFFICE O/P EST MOD 30 MIN: CPT | Mod: 25 | Performed by: DERMATOLOGY

## 2018-09-11 RX ORDER — FLUOROURACIL 50 MG/G
CREAM TOPICAL
Qty: 40 G | Refills: 1 | Status: SHIPPED | OUTPATIENT
Start: 2018-09-11 | End: 2018-09-11

## 2018-09-11 RX ORDER — FLUOCINONIDE 0.5 MG/G
CREAM TOPICAL
Qty: 30 G | Refills: 0 | Status: SHIPPED | OUTPATIENT
Start: 2018-09-11 | End: 2019-01-28

## 2018-09-11 ASSESSMENT — PAIN SCALES - GENERAL: PAINLEVEL: NO PAIN (0)

## 2018-09-11 NOTE — MR AVS SNAPSHOT
After Visit Summary   9/11/2018    Michaela Dorman    MRN: [id number]           Patient Information     Date Of Birth          1950        Visit Information        Provider Department      9/11/2018 1:00 PM Yecenia Castle MD [facility]        Today's Diagnoses     Neoplasm of uncertain behavior of skin    -  1    AK (actinic keratosis)        Prurigo nodularis        Nonvenomous arthropod as cause of injury          Care Instructions    Bug bites on back of hand:  Apply fluocinonide 0.05% cream (AKA Lidex) twice daily for up to two weeks or until resolved.  If not resolved after two weeks, call our office for another appointment.        Wound Care After a Biopsy    What is a skin biopsy?  A skin biopsy allows the doctor to examine a very small piece of tissue under the microscope to determine the diagnosis and the best treatment for the skin condition. A local anesthetic (numbing medicine)  is injected with a very small needle into the skin area to be tested. A small piece of skin is taken from the area. Sometimes a suture (stitch) is used.     What are the risks of a skin biopsy?  I will experience scar, bleeding, swelling, pain, crusting and redness. I may experience incomplete removal or recurrence. Risks of this procedure are excessive bleeding, bruising, infection, nerve damage, numbness, thick (hypertrophic or keloidal) scar and non-diagnostic biopsy.    How should I care for my wound for the first 24 hours?    Keep the wound dry and covered for 24 hours    If it bleeds, hold direct pressure on the area for 15 minutes. If bleeding does not stop then go to the emergency room    Avoid strenuous exercise the first 1-2 days or as your doctor instructs you    How should I care for the wound after 24 hours?    After 24 hours, remove the bandage    You may bathe or shower as normal    If you had a scalp biopsy, you can shampoo as usual and can use shower water to clean the  biopsy site daily    Clean the wound twice a day with gentle soap and water    Do not scrub, be gentle    Apply white petroleum/Vaseline after cleaning the wound with a cotton swab or a clean finger, and keep the site covered with a Bandaid /bandage. Bandages are not necessary with a scalp biopsy    If you are unable to cover the site with a Bandaid /bandage, re-apply ointment 2-3 times a day to keep the site moist. Moisture will help with healing    Avoid strenuous activity for first 1-2 days    Avoid lakes, rivers, pools, and oceans until the stitches are removed or the site is healed    How do I clean my wound?    Wash hands thoroughly with soap or use hand  before all wound care    Clean the wound with gentle soap and water    Apply white petroleum/Vaseline  to wound after it is clean    Replace the Bandaid /bandage to keep the wound covered for the first few days or as instructed by your doctor    If you had a scalp biopsy, warm shower water to the area on a daily basis should suffice    What should I use to clean my wound?     Cotton-tipped applicators (Qtips )    White petroleum jelly (Vaseline ). Use a clean new container and use Q-tips to apply.    Bandaids   as needed    Gentle soap     How should I care for my wound long term?    Do not get your wound dirty    Keep up with wound care for one week or until the area is healed.    A small scab will form and fall off by itself when the area is completely healed. The area will be red and will become pink in color as it heals. Sun protection is very important for how your scar will turn out. Sunscreen with an SPF 30 or greater is recommended once the area is healed.    You should have some soreness but it should be mild and slowly go away over several days. Talk to your doctor about using tylenol for pain,    When should I call my doctor?  If you have increased:     Pain or swelling    Pus or drainage (clear or slightly yellow drainage is  ok)    Temperature over 100F    Spreading redness or warmth around wound    When will I hear about my results?  The biopsy results can take 2-3 weeks to come back. The clinic will call you with the results, send you a mycPathfult message, or have you schedule a follow-up clinic or phone time to discuss the results. Contact our clinics if you do not hear from us in 3 weeks.     Who should I call with questions?    [facility]: [phone]     [facility]: [phone]    For urgent needs outside of business hours call the [facility] at [phone] and ask for the dermatology resident on call      Precancerous spots on the face treatment - Efudex Treatment:  Treat forehead, cheeks, nose, and skin underneath the nose to just above the lip (do not treat lips!)    Today, you are being prescribed Fluorouracil (Efudex) a topical cream used for the treatment of Actinic Keratosis (AK's).  The medication is working to eliminate the unhealthy cells. Even though this treatment may be unattractive and somewhat uncomfortable.    Your treatment will last twice daily for 3-4 weeks.  You may experience some mild discomfort while being treated.    You will want to stop any other creams such as glycolic acid products, retin A, Tazorac, etc. to the area. You may use bland makeup/cover-up as long as it doesn't sting or cause you discomfort.    Apply the cream at night as your physician recommends. Use a cotton-tipped applicator, or use gloves if applying it with your fingertips. If applied with unprotected fingertips, it is important to wash your hands well after you apply this medicine.     Keep this medication away from pets.    We recommend avoiding excessive sun exposure to the treated area    You may use moisturizing creams over bothersome areas such as Vanicream or Cetaphil cream if the reaction becomes too bothersome. Please, call the clinic if this occurs.    Potential Side Effects    Your treated areas may be unsightly during therapy.  This will improve slowly following the discontinuation of therapy.     During the first week of application, mild inflammation may occur.     During the following weeks, redness, and swelling may occur with some crusting and burning.     Lesions resolve as the skin exfoliates.     Over 1 to 2 weeks, new skin grows into the treatment area.    Keep this medication away from pets  Specific side effects that usually do not require medical attention (report to your doctor or health care professional if they continue or are bothersome) include: Red or dark-colored skin     Mild erosion (loss of upper layer of skin)     Mild eye irritation including burning, itching, sensitivity, stinging, or watering     Increased sensitivity of the skin to sun and ultraviolet light     Pain and burning of the affected area     Dryness, scaling or swelling of the affected area     Skin rash, itching of the affected area     Tenderness     If you have severe pain, please, call the clinic immediately and indicate that you have pain. Ask for a call from the RN.   Who should I call with questions?     [facility]: [phone]     [facility]: [phone]     For urgent needs outside of business hours call the [facility] at [phone]  and ask for the dermatology resident on call                      Follow-ups after your visit        Your next 10 appointments already scheduled     Sep 19, 2018  1:30 PM CDT   Ortho Eval with Sue Iniguez, PT   [facility] ([facility])    [address] [place] [address] Dr Caio ALICEA [id number] [phone]            Nov 15, 2018 10:00 AM CST   LAB with NL LAB [facility] ([facility])    [address] [place] [id number] [phone]           Please do not eat 10-12 hours  before your appointment if you are coming in fasting for labs on lipids, cholesterol, or glucose (sugar). This does not apply to pregnant women. Water, hot tea and black coffee (with nothing added) are okay. Do not drink other fluids, diet soda or chew gum.            Nov 20, 2018 10:00 AM CST   [facility]    [address] [facility] [id number] [phone]            Dec 11, 2018  1:00 PM CST   Return Visit with Yecenia Castle MD   [facility] ([facility]    [id number] [place] [id number] [phone]              Who to contact     If you have questions or need follow up information about today's clinic visit or your schedule please contact [facility] directly at [phone].  Normal or non-critical lab and imaging results will be communicated to you by Tiltaphart, letter or phone within 4 business days after the clinic has received the results. If you do not hear from us within 7 days, please contact the clinic through [email or web address] or phone. If you have a critical or abnormal lab result, we will notify you by phone as soon as possible.  Submit refill requests through BrightNest or call your pharmacy and they will forward the refill request to us. Please allow 3 business days for your refill to be completed.          Additional Information About Your Visit        BrightNest Information     BrightNest gives you secure access to your electronic health record. If you see a primary care provider, you can also send messages to your care team and make appointments. If you have questions, please call your primary care clinic.  If you do not have a primary care provider, please call [phone] and they will assist you.      BrightNest is an electronic gateway that provides easy, online access to your medical records. With BrightNest, you can request a clinic appointment,  read your test results, renew a prescription or communicate with your care team.     To access your existing account, please contact your [facility] or call [phone] for assistance.        Care EveryWhere ID     This is your Care EveryWhere ID. This could be used by other organizations to access your [facility] medical records  [id number]         Blood Pressure from Last 3 Encounters:   09/07/18 130/58   08/02/18 112/76   06/15/18 131/77    Weight from Last 3 Encounters:   09/07/18 141 lb 1.6 oz (64 kg)   06/05/18 143 lb 9.6 oz (65.1 kg)   05/15/18 140 lb (63.5 kg)              We Performed the Following     BIOPSY SKIN/SUBQ/MUC MEM, SINGLE LESION     Dermatological path order and indications          Today's Medication Changes          These changes are accurate as of 9/11/18  1:26 PM.  If you have any questions, ask your nurse or doctor.               Start taking these medicines.        Dose/Directions    fluocinonide 0.05 % cream   Commonly known as:  LIDEX   Used for:  Prurigo nodularis, Nonvenomous arthropod as cause of injury   Started by:  Yecenia Castle MD        Apply thin layer twice daily to spots on left hand until resolved or up to two weeks.   Quantity:  30 g   Refills:  0       fluorouracil 5 % cream   Commonly known as:  EFUDEX   Used for:  AK (actinic keratosis)   Started by:  Yecenia Castle MD        Apply thin layer twice daily to face for 3-4 weeks as tolerated   Quantity:  40 g   Refills:  1            Where to get your medicines      These medications were sent to [facility] [address] [place] [address] [address]  [address] [place] [address]     Phone:  [phone]     fluocinonide 0.05 % cream    fluorouracil 5 % cream                Primary Care Provider Office Phone # Fax #    Phani Tapia PA-C [phone] [address] [facility] [address]        Equal Access to Services     SHIVANI ARTIS AH: Amy le  Sojose, walesleyda luqadaha, qaybta kaalmada padmini, wale melendez lillyveronica sotosusi becky. So [facility] [phone].    ATENCIÓN: Si jason cohen, tiene a ordoñez disposición servicios gratuitos de asistencia lingüística. Shaneka al [phone].    We comply with applicable federal civil rights laws and [place] laws. We do not discriminate on the basis of race, color, national origin, age, disability, sex, sexual orientation, or gender identity.            Thank you!     Thank you for choosing [facility]  for your care. Our goal is always to provide you with excellent care. Hearing back from our patients is one way we can continue to improve our services. Please take a few minutes to complete the written survey that you may receive in the mail after your visit with us. Thank you!             Your Updated Medication List - Protect others around you: Learn how to safely use, store and throw away your medicines at www.disposemymeds.org.          This list is accurate as of 9/11/18  1:26 PM.  Always use your most recent med list.                   Brand Name Dispense Instructions for use Diagnosis    ALPRAZolam 0.5 MG tablet    XANAX    30 tablet    Take 1 tablet (0.5 mg) by mouth 3 times daily as needed for anxiety    Anxiety       atorvastatin 20 MG tablet    LIPITOR    30 tablet    TAKE ONE TABLET BY MOUTH EVERY DAY    Hyperlipidemia LDL goal <130       fluocinonide 0.05 % cream    LIDEX    30 g    Apply thin layer twice daily to spots on left hand until resolved or up to two weeks.    Prurigo nodularis, Nonvenomous arthropod as cause of injury       fluorouracil 5 % cream    EFUDEX    40 g    Apply thin layer twice daily to face for 3-4 weeks as tolerated    AK (actinic keratosis)       FLUoxetine 40 MG capsule    PROzac    90 capsule    Take 1 capsule (40 mg) by mouth daily    Adjustment disorder with depressed mood       hydrochlorothiazide 25 MG tablet    HYDRODIURIL    90 tablet    Take 1 tablet  (25 mg) by mouth daily    Essential hypertension       oxybutynin 5 MG tablet    DITROPAN    90 tablet    TAKE ONE-HALF TABLET BY MOUTH TWICE A DAY    Dysuria       oxyCODONE-acetaminophen 5-325 MG per tablet    PERCOCET    18 tablet    Take 1-2 tablets by mouth every 6 hours as needed for moderate to severe pain    Chronic pain syndrome, DDD (degenerative disc disease), lumbar       PREVACID PO      Take 40 mg by mouth        prochlorperazine 10 MG tablet    COMPAZINE    30 tablet    TAKE ONE TABLET BY MOUTH EVERY 6 HOURS AS NEEDED (NAUSEA/VOMITING)    Encounter for antineoplastic chemotherapy       TYLENOL EX ST ARTHRITIS PAIN 500 MG tablet   Generic drug:  acetaminophen      Take 500-1,000 mg by mouth every 6 hours as needed for mild pain    Dermatitis due to sun

## 2018-09-11 NOTE — PATIENT INSTRUCTIONS
Bug bites on back of hand:  Apply fluocinonide 0.05% cream (AKA Lidex) twice daily for up to two weeks or until resolved.  If not resolved after two weeks, call our office for another appointment.        Wound Care After a Biopsy    What is a skin biopsy?  A skin biopsy allows the doctor to examine a very small piece of tissue under the microscope to determine the diagnosis and the best treatment for the skin condition. A local anesthetic (numbing medicine)  is injected with a very small needle into the skin area to be tested. A small piece of skin is taken from the area. Sometimes a suture (stitch) is used.     What are the risks of a skin biopsy?  I will experience scar, bleeding, swelling, pain, crusting and redness. I may experience incomplete removal or recurrence. Risks of this procedure are excessive bleeding, bruising, infection, nerve damage, numbness, thick (hypertrophic or keloidal) scar and non-diagnostic biopsy.    How should I care for my wound for the first 24 hours?    Keep the wound dry and covered for 24 hours    If it bleeds, hold direct pressure on the area for 15 minutes. If bleeding does not stop then go to the emergency room    Avoid strenuous exercise the first 1-2 days or as your doctor instructs you    How should I care for the wound after 24 hours?    After 24 hours, remove the bandage    You may bathe or shower as normal    If you had a scalp biopsy, you can shampoo as usual and can use shower water to clean the biopsy site daily    Clean the wound twice a day with gentle soap and water    Do not scrub, be gentle    Apply white petroleum/Vaseline after cleaning the wound with a cotton swab or a clean finger, and keep the site covered with a Bandaid /bandage. Bandages are not necessary with a scalp biopsy    If you are unable to cover the site with a Bandaid /bandage, re-apply ointment 2-3 times a day to keep the site moist. Moisture will help with healing    Avoid strenuous activity for  first 1-2 days    Avoid lakes, rivers, pools, and oceans until the stitches are removed or the site is healed    How do I clean my wound?    Wash hands thoroughly with soap or use hand  before all wound care    Clean the wound with gentle soap and water    Apply white petroleum/Vaseline  to wound after it is clean    Replace the Bandaid /bandage to keep the wound covered for the first few days or as instructed by your doctor    If you had a scalp biopsy, warm shower water to the area on a daily basis should suffice    What should I use to clean my wound?     Cotton-tipped applicators (Qtips )    White petroleum jelly (Vaseline ). Use a clean new container and use Q-tips to apply.    Bandaids   as needed    Gentle soap     How should I care for my wound long term?    Do not get your wound dirty    Keep up with wound care for one week or until the area is healed.    A small scab will form and fall off by itself when the area is completely healed. The area will be red and will become pink in color as it heals. Sun protection is very important for how your scar will turn out. Sunscreen with an SPF 30 or greater is recommended once the area is healed.    You should have some soreness but it should be mild and slowly go away over several days. Talk to your doctor about using tylenol for pain,    When should I call my doctor?  If you have increased:     Pain or swelling    Pus or drainage (clear or slightly yellow drainage is ok)    Temperature over 100F    Spreading redness or warmth around wound    When will I hear about my results?  The biopsy results can take 2-3 weeks to come back. The clinic will call you with the results, send you a PlaceSpeak message, or have you schedule a follow-up clinic or phone time to discuss the results. Contact our clinics if you do not hear from us in 3 weeks.     Who should I call with questions?    [facility]: [phone]     [facility]  [facility]: [phone]    For urgent needs outside of business hours call the [facility] at [phone] and ask for the dermatology resident on call      Precancerous spots on the face treatment - Efudex Treatment:  Treat forehead, cheeks, nose, and skin underneath the nose to just above the lip (do not treat lips!)    Today, you are being prescribed Fluorouracil (Efudex) a topical cream used for the treatment of Actinic Keratosis (AK's).  The medication is working to eliminate the unhealthy cells. Even though this treatment may be unattractive and somewhat uncomfortable.    Your treatment will last twice daily for 3-4 weeks.  You may experience some mild discomfort while being treated.    You will want to stop any other creams such as glycolic acid products, retin A, Tazorac, etc. to the area. You may use bland makeup/cover-up as long as it doesn't sting or cause you discomfort.    Apply the cream at night as your physician recommends. Use a cotton-tipped applicator, or use gloves if applying it with your fingertips. If applied with unprotected fingertips, it is important to wash your hands well after you apply this medicine.     Keep this medication away from pets.    We recommend avoiding excessive sun exposure to the treated area    You may use moisturizing creams over bothersome areas such as Vanicream or Cetaphil cream if the reaction becomes too bothersome. Please, call the clinic if this occurs.   Potential Side Effects    Your treated areas may be unsightly during therapy.  This will improve slowly following the discontinuation of therapy.     During the first week of application, mild inflammation may occur.     During the following weeks, redness, and swelling may occur with some crusting and burning.     Lesions resolve as the skin exfoliates.     Over 1 to 2 weeks, new skin grows into the treatment area.    Keep this medication away from pets  Specific side effects that  usually do not require medical attention (report to your doctor or health care professional if they continue or are bothersome) include: Red or dark-colored skin     Mild erosion (loss of upper layer of skin)     Mild eye irritation including burning, itching, sensitivity, stinging, or watering     Increased sensitivity of the skin to sun and ultraviolet light     Pain and burning of the affected area     Dryness, scaling or swelling of the affected area     Skin rash, itching of the affected area     Tenderness     If you have severe pain, please, call the clinic immediately and indicate that you have pain. Ask for a call from the RN.   Who should I call with questions?     [facility]: [phone]     [facility]: [phone]     For urgent needs outside of business hours call the [facility] at [phone]  and ask for the dermatology resident on call

## 2018-09-11 NOTE — NURSING NOTE
Michaela Dorman's goals for this visit include:   Chief Complaint   Patient presents with     Skin Check     Area of concern right cheek, and left hand. Peronal hx BCC. Family hx of SC.        She requests these members of her care team be copied on today's visit information:     PCP: Phnai Tapia    Referring Provider:  No referring provider defined for this encounter.    There were no vitals taken for this visit.    Do you need any medication refills at today's visit? No.    Aminta Keane LPN

## 2018-09-11 NOTE — PROGRESS NOTES
[facility] Dermatology Note      Dermatology Problem List:  1.BCC, left cheek  -s/p Mohs 8/18/2016  2. Actinic keratosis bordering on SCCIS, upper chest (left chest)  -s/p excision 8/29/2011  3. Actinic keratosis  -s/p cryotherapy  -plan for efudex x3-4 weeks BID Nov 2018  4. History of lost biopsy specimen from R chest on 6/15/17. Repeat biopsy 8/2/17 showed only scar.\  5. Suspected bug bites leading to prurigo nodules on left dorsal hand  -Treat with lidex 0.5% cream BID x 2 weeks, if fails to resolve consider biopsy to rule out SCC/HAK  6. NUB, right posterior shoulder, s/p bx 9/11/18. Concerning for sBCC.  7. Relevant medical history:  Breast cancer s/p surgery, chemo, radiation. Now in remission.    Encounter Date: Sep 11, 2018    CC:  Chief Complaint   Patient presents with     Skin Check     Area of concern right cheek, and left hand. Peronal hx BCC. Family hx of SC.      History of Present Illness:  Ms. Michaela Dorman is a 67 year old female who with a history of skin cancer presents for a skin check.  The patietn's last skin check was on 6/15/17.  The patient has a lesion of concern on her right cheek.  This spot is itchy sometimes.  It is not painful and has not bled.  She also notes concerns about new itchy bumps on the top of her left hand. These have been present a few weeks and continue to itch now.  She is unsure but thinks they may have started as bug bites.  Of note, patient had biopsy on the right chest 6/15/17.  The specimen was lost by the lab.  A repeat biopsy on 8/2/17 showed only scar. No problems at this site since.    No other lesions of concern today.    Past Medical History:   Patient Active Problem List   Diagnosis     Enthesopathy of hip region     Family history of stroke (cerebrovascular)     Trochanteric bursitis     Advanced directives, counseling/discussion     Health Care Home     Hypertension goal BP (blood pressure) < 140/90     Baker's cyst of knee      Patella-femoral syndrome     Adjustment disorder with depressed mood     Essential hypertension     Malignant neoplasm of upper-outer quadrant of left female breast (H)     Encounter for antineoplastic chemotherapy     S/P bilateral mastectomy     Anxiety     Hyperlipidemia LDL goal <130     S/P reverse total shoulder arthroplasty, right     Prophylactic antibiotic for dental procedure indicated due to prior joint replacement     Chronic pain syndrome     Lumbar radiculopathy     Foraminal stenosis of lumbar region     Central stenosis of spinal canal     DDD (degenerative disc disease), lumbar     Past Medical History:   Diagnosis Date     Central stenosis of spinal canal 12/1/2017    lumbar      DDD (degenerative disc disease), lumbar 12/1/2017     Depressive disorder, not elsewhere classified      Esophageal reflux      ETOH abuse     in remission     Foraminal stenosis of lumbar region 12/1/2017    Complete lumbar spine left at various degrees and to a lesser extent the right as well.     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     Subdural hematoma (H)      Past Surgical History:   Procedure Laterality Date     ARTHROPLASTY SHOULDER Right 11/17/2015     ARTHROSCOPY KNEE  6/7/2012    Procedure:ARTHROSCOPY KNEE; right knee arthroscopy with partial lateral menisectomy; Surgeon:DAMIÁN MCALLISTER; Location:[facility] OR     C LIGATE FALLOPIAN TUBE       C NONSPECIFIC PROCEDURE  1956    ankle fracture surgery     COLONOSCOPY N/A 12/22/2017    Procedure: COLONOSCOPY;  colonoscopy;  Surgeon: Chuck Chand MD;  Location: [facility] GI     EXCISE MASS AXILLA Left 9/16/2016    Procedure: EXCISE MASS AXILLA;  Surgeon: Keyon Blanco MD;  Location: [facility] OR     HC REMV CATARACT EXTRACAP,INSERT LENS  6/25/2009    Right eye     INSERT PORT VASCULAR ACCESS Right 10/7/2016    Procedure:  INSERT PORT VASCULAR ACCESS;  Surgeon: Keyon Blanco MD;  Location: [facility]     MASTECTOMY SIMPLE BILATERAL Bilateral 2/8/2017    Procedure: MASTECTOMY SIMPLE BILATERAL;  Surgeon: Keyon Blanco MD;  Location: [facility] OR     OPEN REDUCTION INTERNAL FIXATION FOOT Right 3/20/2015    Procedure: OPEN REDUCTION INTERNAL FIXATION FOOT;  Surgeon: Javi Herrmann DPM;  Location: [facility] OR     REMOVE PORT VASCULAR ACCESS Right 2/14/2018    Procedure: REMOVE PORT VASCULAR ACCESS;  right intra jugular port removal;  Surgeon: Keyon Blanco MD;  Location: [facility] OR     SHOULDER SURGERY Right 11/2015       Social History:   reports that she quit smoking about 38 years ago. Her smoking use included Cigarettes. She has a 30.00 pack-year smoking history. She has never used smokeless tobacco. She reports that she drinks alcohol. She reports that she does not use illicit drugs. Patient rides horses for fun.    Family History:  Family History   Problem Relation Age of Onset     Hypertension Mother      Thyroid Disease Mother      Cerebrovascular Disease Mother      Hypertension Father      Cerebrovascular Disease Father      Cancer Father      skin     Thyroid Disease Sister      Diabetes Brother      type 2     Depression Sister      Breast Cancer Sister      age 47     Cancer Sister      skin     Cancer Brother      inside nose       Medications:  Current Outpatient Prescriptions   Medication Sig Dispense Refill     acetaminophen (TYLENOL EX ST ARTHRITIS PAIN) 500 MG tablet Take 500-1,000 mg by mouth every 6 hours as needed for mild pain       ALPRAZolam (XANAX) 0.5 MG tablet Take 1 tablet (0.5 mg) by mouth 3 times daily as needed for anxiety 30 tablet 0     atorvastatin (LIPITOR) 20 MG tablet TAKE ONE TABLET BY MOUTH EVERY DAY 30 tablet 0     FLUoxetine (PROZAC) 40 MG capsule Take 1 capsule (40 mg) by mouth daily 90 capsule 1     hydrochlorothiazide (HYDRODIURIL) 25 MG tablet Take 1 tablet (25 mg) by mouth daily 90 tablet 1      Lansoprazole (PREVACID PO) Take 40 mg by mouth       oxybutynin (DITROPAN) 5 MG tablet TAKE ONE-HALF TABLET BY MOUTH TWICE A DAY 90 tablet 1     oxyCODONE-acetaminophen (PERCOCET) 5-325 MG per tablet Take 1-2 tablets by mouth every 6 hours as needed for moderate to severe pain 18 tablet 0     prochlorperazine (COMPAZINE) 10 MG tablet TAKE ONE TABLET BY MOUTH EVERY 6 HOURS AS NEEDED (NAUSEA/VOMITING) 30 tablet 3       Allergies   Allergen Reactions     No Known Drug Allergies        Review of Systems:  -Constitutional:  Feeling well, in usual state of health.  -Skin:  As per HPI, no additional concerns.    Physical exam:  Vitals: There were no vitals taken for this visit.  GEN: This is a well developed, well-nourished female in no acute distress, in a pleasant mood.    SKIN: Total skin excluding the undergarment areas was performed. The exam included the head/face, neck, both arms, chest, back, abdomen, both legs, digits and/or nails.   -White type II skin.  Severe dermatoheliosis.  -30 gritty pink papules scattered on the face - mostly forehead, cheeks and nose. Chin is only area on the face that is spared.  External ears are also clear.  -Many solar lentigines on the face, upper chest, upper back, and upper extremities.  -Left Dorsal hand- 3 excoriated pink papules that are in a linear array - suspect prurigo nodules after bug bites  -Right Upper Chest - Circular well healed scar from previous biopsies. No telangiectasias or pearly appearance.  -Right Posterior Shoulder - approx 1.5cm shiny brightly pink patch  -No other lesions of concern on areas examined.       Impression/Plan:  1. History of nonmelanoma skin cancer, no clincial evidence of recurrence.  Patient does exhibit severe dermatoheliosis on exmaination.    ABCDs of melanoma were discussed and self skin checks were advised.     Sun precaution was advised including the use of sun screens of SPF 30 or higher, sun protective clothing, and avoidance of  tanning beds.    2. Actinic keratosis, diffuse involvement of the face:  Discussed treatment options in detail including cryotherapy, efudex cream, or blue light treatment.  Recommended field treatment given diffuse involvement.  Patient preferred efudex cream option.  Two hands given in person.  WIll have patient treat BID to the face for 3-4 weeks as tolerated. Patient will wait until horse riding season is over (end of Oct), and then treat in November. I will plan to see her back in Dec to recheck to ensure all areas have resolved and treat any remaining with cryotherapy.    Efudex 5% cream prescribed as above.    3. Prurigo nodules after bug bites:  Suspect lesions on the left dorsal hand started as bug bites given there linear array.  They are not tender to the touch but itchy, which is also more consistent with bite than HAK/SCC.  Will have patient treat with fluocinonide 0.05% cream BID for up to two weeks.  If not resolved in two weeks, will reassess and consider biopsy.    Lidex 0.05% prescribed as above.    4. History of lost biopsy specimen with repeat biopsy showing only scar.  Area remains well healed. No concern for NMSC at this site.    5. Neoplasm of uncertain behavior on the Right Posterior Shoulder. The differential diagnosis includes Superficial BCC versus Scar. If shows sBCC, would treat with ED&C.    Shave biopsy:  After discussion of benefits and risks including but not limited to bleeding/bruising, pain/swelling, infection, scar, incomplete removal, nerve damage/numbness, recurrence, and non-diagnostic biopsy, written consent, verbal consent and photographs were obtained. Time-out was performed. The area was cleaned with isopropyl alcohol.  was injected to obtain adequate anesthesia of the lesion on the lidocaine with epinephrine. 0.5ml of 1% lidocaine with 1:100,000 epinephrine was injected to obtain adequate anesthesia. A  shave biopsy was performed. Hemostasis was achieved with aluminium  chloride. Vaseline and a sterile dressing were applied. The patient tolerated the procedure and no complications were noted. The patient was provided with verbal and written post care instructions.      Follow-up 4 weeks after treatment (Demeber or January)       Staff Involved:  Scribe/Staff    Scribe Disclosure:   ICarina, am serving as a scribe to document services personally performed by Dr. Yecenia Castle, based on data collection and the provider's statements to me.     Provider Disclosure:   The documentation recorded by the scribe accurately reflects the services I personally performed and the decisions made by me.    Yeecnia Castle MD    Department of Dermatology  [facility]: Phone: [phone], Fax:[phone]  [facility]: Phone: [phone], Fax: [phone]

## 2018-09-17 ENCOUNTER — TELEPHONE (OUTPATIENT)
Dept: DERMATOLOGY | Facility: CLINIC | Age: 68
End: 2018-09-17

## 2018-09-17 LAB — COPATH REPORT: NORMAL

## 2018-09-17 NOTE — TELEPHONE ENCOUNTER
Notes Recorded by Hilda Kerr LPN on 9/17/2018 at 2:13 PM  Writer spoke with patient and informed her of results. Patient will contact the clinic if any change to area on right posterior shoulder prior to next appointment. No further questions or concerns per patient at this time.     Hilda Kerr LPN    ------    Notes Recorded by Yecenia Mcdonald MD on 9/17/2018 at 12:46 PM  Please call the patient and let her know that the biopsy done on the right shoulder only showed scar.  I will recheck this at her next visit.  If it grows in size, becomes tender to the touch, or bleeds without trauma, she should let us know and come back for recheck sooner.    Yecenia Mcdonald MD    Department of Dermatology  [facility]: Phone: [phone], Fax:[phone]  [facility]: Phone: [phone], Fax: [phone]            6d ago       Copath Report Patient Name: NICHOLAS DONNELLY   MR#: [id number]   Specimen #: [id number]   Collected: 9/11/2018   Received: 9/12/2018   Reported: 9/17/2018 11:19   Ordering Phy(s): YECENIA MCDONALD     For improved result formatting, select 'View Enhanced Report Format' under    Linked Documents section.     SPECIMEN(S):   Skin, right posterior shoulder     FINAL DIAGNOSIS:   Skin, right posterior shoulder:   - Scar - (see description)                Hilda Kerr LPN

## 2018-09-19 ENCOUNTER — HOSPITAL ENCOUNTER (OUTPATIENT)
Dept: PHYSICAL THERAPY | Facility: CLINIC | Age: 68
Setting detail: THERAPIES SERIES
End: 2018-09-19
Attending: PHYSICIAN ASSISTANT
Payer: MEDICARE

## 2018-09-19 PROCEDURE — G8978 MOBILITY CURRENT STATUS: HCPCS | Mod: GP,CK | Performed by: PHYSICAL THERAPIST

## 2018-09-19 PROCEDURE — 97530 THERAPEUTIC ACTIVITIES: CPT | Mod: GP | Performed by: PHYSICAL THERAPIST

## 2018-09-19 PROCEDURE — 40000718 ZZHC STATISTIC PT DEPARTMENT ORTHO VISIT: Performed by: PHYSICAL THERAPIST

## 2018-09-19 PROCEDURE — 97162 PT EVAL MOD COMPLEX 30 MIN: CPT | Mod: GP | Performed by: PHYSICAL THERAPIST

## 2018-09-19 PROCEDURE — G8979 MOBILITY GOAL STATUS: HCPCS | Mod: GP,CJ | Performed by: PHYSICAL THERAPIST

## 2018-09-19 NOTE — PROGRESS NOTES
09/19/18 2372   General Information   Type of Visit Initial OP Ortho PT Evaluation   Start of Care Date 09/19/18   Referring Physician Venkat Shipley PA-C   Patient/Family Goals Statement to be able to ride horses and hunt   Orders Evaluate and Treat   Date of Order 09/07/18   Insurance Type Medicare   Insurance Comments/Visits Authorized BCBS secondary   Medical Diagnosis Chronic (B) LBP with (B) sciatica   Surgical/Medical history reviewed Yes   Precautions/Limitations no known precautions/limitations   Body Part(s)   Body Part(s) Lumbar Spine/SI   Presentation and Etiology   Pertinent history of current problem (include personal factors and/or comorbidities that impact the POC) Pt reports that her chronic back pain is a little worse and the pain in her leg returned about 3-4 mos ago. Had a second  and a 3rd cortisone shots, most recent about 6 weeks ago and they have not helped. Fell when getting off treadmill 1 mo ago, after getting shot because leg was numb.  L LE is the primary problem and pain is all the way down to lateral ankle.  (Of note L ankle was also broken as a child, ? arthritis? ) R LE pain x 1 mo particularly if she lies on the R side.  MD has prescribed Oxycodone.  Uses Tylenol and IBP, Walks daily 3 miles on a treadmill or outside, and lifts weights.  Getting worse because the pain is worse and more constant.  Interrupting her sleep at least a couple of times per night.     PMHX: breast CA   Impairments A. Pain;B. Decreased WB tolerance;D. Decreased ROM;F. Decreased strength and endurance;H. Impaired gait;G. Impaired balance;K. Numbness   Functional Limitations perform activities of daily living;perform required work activities;perform desired leisure / sports activities   Symptom Location Primarily the L LE and L buttock and low back now also has pain down R Leg intermittently to ankle.   How/Where did it occur With a fall;From insidious onset   Onset date of current episode/exacerbation 07/19/18    Chronicity Chronic   Pain rating (0-10 point scale) Best (/10);Worst (/10)   Best (/10) 2   Worst (/10) 8   Pain quality B. Dull;C. Aching;A. Sharp   Frequency of pain/symptoms A. Constant   Pain/symptoms are: The same all the time   Pain/symptoms exacerbated by B. Walking;C. Lifting;D. Carrying;I. Bending;G. Certain positions;H. Overhead reach;J. ADL;K. Home tasks  (Standing)   Pain/symptoms eased by C. Rest;H. Cold;E. Changing positions;F. Certain positions;I. OTC medication(s)   Progression of symptoms since onset: Worsened   Prior Level of Function   Prior Level of Function-Mobility indep   Prior Level of Function-ADLs indep   Functional Level Prior Comment indep   Current Level of Function   Current Community Support Family/friend caregiver   Patient role/employment history F. Retired   Living environment [place]home  [place]   Home/community accessibility no concerns Stairs hurt and laundry is downstairs   Current equipment-Gait/Locomotion None   Current equipment-ADL None   Fall Risk Screen   Fall screen completed by PT   Have you fallen 2 or more times in the past year? Yes   Have you fallen and had an injury in the past year? Yes   Is patient a fall risk? Yes   Fall screen comments Struggles balance etc 6 falls this year   Abuse Risk Screen   QUESTION TO PATIENT:  Has a member of your family or a partner(now or in the past) intimidated, hurt, manipulated, or controlled you in any way? no   QUESTION TO PATIENT: Do you feel safe going back to the place where you are living? yes   OBSERVATION: Is there reason to believe there has been maltreatment of a vulnerable adult (ie. Physical/Sexual/Emotional abuse, self neglect, lack of adequate food, shelter, medical care, or financial exploitation)? no   System Outcome Measures   Outcome Measures Low Back Pain (see Oswestry and Ryland)   Lumbar Spine/SI Objective Findings   Observation NAD   Integumentary WFL no concerns   Posture  flattened lumbar lordosis, hangs on anterior hip ligaments   Gait/Locomotion weak hip girdle musculature. more on L than R   Flexion ROM fingers to floor good curvature   Extension ROM good extn good reversal of curve   Right Side Bending ROM tight on L side 100%   Left Side Bending ROM Tight on R and 100%   Lumbar ROM Comment Rotation slight discomfort but ok.    Hip Screen functional squat negative   Hip Flexion (L2) Strength 4/5 (B)   Knee Extension (L3) Strength 5/5   Ankle Dorsiflexion (L4) Strength weak on L vs R unable to maintain heel walk   Great Toe Extension (L5) Strength 3-/5 (B)    Ankle Plantar Flexion (S1) Strength 4/5   Hamstring Flexibility 70 deg   Quadricep Flexibility good   Spring Test pain at lumbar levels   Segmental Mobility tight   Sensation Testing in tact to light touch but left feels slightly asleep compared to R   Patellar Tendon Reflexes  normal   Palpation TTP at priformis on L and along lumbar parasoinals   Balance/Proprioception (Single Leg Stance) R 10 sec and 8 sec on L   Knee Flexion Strength 5/5   Slump Test positive on L Negative on R   SLR neg   Ely Test positive   Crossover SLR neg   Achilles Tendon Reflexes normal   Transversus Abdominus Strength (Stanley Leg Lowering-deg) fair   Hip Extension Strength able to perform sit<> stand with out UE assist.    Planned Therapy Interventions   Planned Therapy Interventions manual therapy;gait training;neuromuscular re-education;ROM;strengthening;stretching;balance training   Planned Modality Interventions   Planned Modality Interventions Cryotherapy   Clinical Impression   Criteria for Skilled Therapeutic Interventions Met yes, treatment indicated   PT Diagnosis Pain in back, L>R LE,  impaired balance,  decreased strength,  Falls   Influenced by the following impairments pain, decreased strength, impaired balance,     Functional limitations due to impairments pain with functional mobility and gait decreased safety with ADLS and IADLS.     Clinical Presentation Evolving/Changing   Clinical Presentation Rationale History of cancer, falls   Clinical Decision Making (Complexity) Moderate complexity   Therapy Frequency 2 times/Week   Predicted Duration of Therapy Intervention (days/wks) 90 days   Risk & Benefits of therapy have been explained Yes   Patient, Family & other staff in agreement with plan of care Yes   Clinical Impression Comments PPW Pain in back, L>R LE,  impaired balance,  decreased strength,  Falls. Pt will benefit from skilled PT for instruction in HEP for core stabilization and home management. Pt will also benefit from manual techniques and modalities.    Education Assessment   Preferred Learning Style Listening   Barriers to Learning No barriers   ORTHO GOALS   PT Ortho Eval Goals 1;2;3   Ortho Goal 1   Goal Identifier Standing   Goal Description Pt able to stand for all of the [place] in [employer] (about 20 mins)   Target Date 12/18/18   Ortho Goal 2   Goal Identifier Walking   Goal Description Pt able to walk with out tripping on her foot and with out increased back pain for 3 miles (50 mins)   Target Date 12/18/18   Ortho Goal 3   Goal Identifier Falls   Goal Description Pt able to perform FGA better than 23/30,  and Shen balance scale better than 46/42 to allow her to coon hunt with grand kids   Target Date 12/18/18

## 2018-09-19 NOTE — PROGRESS NOTES
[facility] HOSPITALIST PROGRESS NOTE     Patient Identification:  Name:  Sara Cardona  Age:  66 y.o.  Sex:  female  :  1951  MRN:  [id number]  Visit Number:  [id number]  Primary Care Provider:  Marcelino Sheehan MD    Length of stay:  12    Chief complaint:  66 years old female admitted with acute on chronic renal failure.     Subjective:    Patient developed diarrhea since yesterday.    Patient is complaining of nausea and vomiting and watery diarrhea..  She states that her throat is slightly better.  Complaining of cough with yellowish sputum.  Patient denies any headache,  chest pain, palpitation, constipation or diarrhea.  Diuresed total of 7.5L.    ----------------------------------------------------------------------------------------------------------------------  Current Hospital Meds:    amoxicillin-clavulanate 1 tablet Oral Q12H   aspirin 81 mg Oral Nightly   atorvastatin 40 mg Oral Nightly   budesonide-formoterol 2 puff Inhalation BID - RT   clopidogrel 75 mg Oral Daily   doxycycline 100 mg Oral Q12H   epoetin jane 10,000 Units Subcutaneous Once per day on    furosemide 80 mg Intravenous Q12H   guaiFENesin 1,200 mg Oral Q12H   heparin (porcine) 5,000 Units Subcutaneous Q12H   insulin aspart 0-24 Units Subcutaneous 4x Daily AC & at Bedtime   insulin detemir 25 Units Subcutaneous Nightly   ipratropium-albuterol 3 mL Nebulization Q4H - RT   isosorbide mononitrate 60 mg Oral Q24H   magnesium oxide 800 mg Oral BID   magnesium sulfate 2 g Intravenous Once   metoprolol tartrate 50 mg Oral Q12H   nicotine 1 patch Transdermal Nightly   nystatin susp + lidocaine viscous 5 mL Swish & Swallow 4x Daily   pantoprazole 40 mg Oral QAM AC   Pharmacy Meds to Bed Consult  Does not apply Daily   predniSONE 5 mg Oral Daily With Breakfast   sodium bicarbonate 1,300 mg Oral 4x Daily       hold 1 each      [facility]          OUTPATIENT PHYSICAL THERAPY ORTHOPEDIC EVALUATION  PLAN OF TREATMENT FOR OUTPATIENT REHABILITATION  (COMPLETE FOR INITIAL CLAIMS ONLY)  Patient's Last Name, First Name, M.I.  YOB: 1950  Michaela Dorman    Provider s Name:  [facility]   Medical Record No.  [id number]   Start of Care Date:  09/19/18   Onset Date:  07/19/18   Type:     _X__PT   ___OT   ___SLP Medical Diagnosis:  Chronic (B) LBP with (B) sciatica     PT Diagnosis:  Pain in back, L>R LE,  impaired balance,  decreased strength,  Falls   Visits from SOC:  1      _________________________________________________________________________________  Plan of Treatment/Functional Goals:  manual therapy, gait training, neuromuscular re-education, ROM, strengthening, stretching, balance training     Cryotherapy     Goals  Goal Identifier: Standing  Goal Description: Pt able to stand for all of the [employer] in [employer] (about 20 mins)  Target Date: 12/18/18    Goal Identifier: Walking  Goal Description: Pt able to walk with out tripping on her foot and with out increased back pain for 3 miles (50 mins)  Target Date: 12/18/18    Goal Identifier: Falls  Goal Description: Pt able to perform FGA better than 23/30,  and Shen balance scale better than 46/42 to allow her to coon hunt with grand kids  Target Date: 12/18/18                                                           Therapy Frequency:  2 times/Week  Predicted Duration of Therapy Intervention:  90 days    Sue Iniguez, MARTHA                 I CERTIFY THE NEED FOR THESE SERVICES FURNISHED UNDER        THIS PLAN OF TREATMENT AND WHILE UNDER MY CARE     (Physician co-signature of this document indicates review and certification of the therapy plan).                       Certification Date From:  09/19/18   Certification Date To:  12/18/18    Referring Provider:  Venkat Shipley PA-C    Initial Assessment        See Epic Evaluation      ----------------------------------------------------------------------------------------------------------------------  Vital Signs:  Temp:  [98 °F (36.7 °C)-99.3 °F (37.4 °C)] 98 °F (36.7 °C)  Heart Rate:  [] 88  Resp:  [16-24] 16  BP: (125-190)/(64-96) 155/72  Last 3 weights    08/03/17  0405 08/04/17  0600 08/05/17  0600   Weight: 173 lb 3.2 oz (78.6 kg) 164 lb (74.4 kg) 153 lb 9.6 oz (69.7 kg)     Body mass index is 29.02 kg/(m^2).    Intake/Output Summary (Last 24 hours) at 08/05/17 0923  Last data filed at 08/05/17 0800   Gross per 24 hour   Intake              720 ml   Output             3400 ml   Net            -2680 ml     Diet Regular; Cardiac, Consistent Carbohydrate, Low Potassium  ----------------------------------------------------------------------------------------------------------------------  Physical exam:  Constitutional:  Well-developed and well-nourished.     HENT:  Head:  Normocephalic and atraumatic.  Mouth:  Moist mucous membranes. Minimal erythema with the small pus points in left tonsillar area.   Eyes:  Conjunctivae and EOM are normal.  Pupils are equal, round, and reactive to light.   Neck:  Neck supple.  No JVD present.    Cardiovascular:  Regular rate and rhythm. S1+S2. No murmur, rubs or gallops.   Pulmonary/Chest: Inspiratory bibasilar crackles..  Abdominal:  Soft. Non-tender. No viscera palpable.  Bowel sounds audible.   Musculoskeletal: No deformity or joint swelling.   Peripheral vascular: Bilateral dorsalis pedis palpable.  +2 pitting edema extending up to her thighs  Neurological:  Alert and oriented to person, place, and time.  Cranial nerves grossly intact. Strength bilaterally symmetrical in upper and lower extremities.   Skin:  Skin is warm and dry. No rash noted. No pallor.   ----------------------------------------------------------------------------------------------------------------------  Tele:  Sinus rhythm with heart rate  Start of Care Date: 09/19/18                                                                               70s-80  ----------------------------------------------------------------------------------------------------------------------        Results from last 7 days  Lab Units 08/05/17  0601 08/04/17  0823 08/04/17 0341 08/03/17  0154   CRP mg/dL  --  <0.50  --   --    WBC 10*3/mm3 17.35*  --  17.87* 14.81*   HEMOGLOBIN g/dL 10.1*  --  9.3* 8.6*   HEMATOCRIT % 33.3*  --  30.7* 28.4*   MCV fL 92.2  --  93.0 94.4*   MCHC g/dL 30.3*  --  30.3* 30.3*   PLATELETS 10*3/mm3 182  --  207 229       Results from last 7 days  Lab Units 08/02/17  0922   PH, ARTERIAL pH units 7.249*   PO2 ART mm Hg 87.4   PCO2, ARTERIAL mm Hg 39.9   HCO3 ART mmol/L 17.1*       Results from last 7 days  Lab Units 08/05/17  0601 08/04/17 0341 08/03/17  0154  07/31/17  0454 07/30/17  0404   SODIUM mmol/L 144 145 144  < > 140 137   POTASSIUM mmol/L 3.9 3.9 4.0  < > 4.5 5.3   MAGNESIUM mg/dL 1.9 1.4* 1.6*  < > 1.6*  --    CHLORIDE mmol/L 108 110 112  < > 111 110   CO2 mmol/L 25.8 22.4* 17.0*  < > 15.9* 15.9*   BUN mg/dL 106* 108* 123*  < > 109* 99*   CREATININE mg/dL 3.55* 3.62* 3.96*  < > 3.52* 3.73*   EGFR IF NONAFRICN AM mL/min/1.73 13* 13* 11*  < > 13* 12*   CALCIUM mg/dL 6.6* 6.3* 6.4*  < > 6.9* 6.9*   GLUCOSE mg/dL 157* 84 178*  < > 142* 268*   ALBUMIN g/dL  --   --   --   --  3.30* 3.20*   BILIRUBIN mg/dL  --   --   --   --  0.1* 0.1*   ALK PHOS U/L  --   --   --   --  94 100   AST (SGOT) U/L  --   --   --   --  24 36*   ALT (SGPT) U/L  --   --   --   --  58* 76*   < > = values in this interval not displayed.Estimated Creatinine Clearance: 13.7 mL/min (by C-G formula based on Cr of 3.55).    No results found for: AMMONIA               Stool Culture   Date Value Ref Range Status   07/27/2017 Normal Fecal Andreina  Final       I have personally looked at the labs and they are summarized above.  ----------------------------------------------------------------------------------------------------------------------  Imaging Results (last 24 hours)      Procedure Component Value Units Date/Time    XR Chest 1 View [[id number]] Collected:  08/04/17 1455     Updated:  08/04/17 6606    Narrative:       Technique: Frontal view the chest.     COMPARISON:  7/31/2017     INDICATION:     sob; N17.9-Acute kidney failure, unspecified;  N18.9-Chronic kidney disease, unspecified      FINDINGS:    Patchy bibasilar airspace disease. Cardiomegaly is noted.  Prominent interstitial markings are noted. There are small bilateral  pleural effusions.        Impression:       Radiographic changes of congestive failure.     This report was finalized on 8/4/2017 3:02 PM by Dr. Andres Wu MD.           ----------------------------------------------------------------------------------------------------------------------  Assessment and Plan:    - Acute on chronic hypoxic respiratory failure:   Improved.  2/2 COPD exacerbation and diastolic CHF exacerbation.   CXR showed bibasilar atelactasis.    Continue treatment for COPD exacerbation and CHF. Cont BiPAP PRN and QHS. Pulm input appreciated.   Repeat chest x-ray showed patchy bibasilar airspace disease, prominent interstitial markings and  small bilateral pleural effusions.  CRP is normal.    - Healthcare associated pneumonia  Chest x-ray showed patchy bibasilar airspace disease.  Patient has leukocytosis and productive cough.  We'll broaden antibiotic coverage and add cefepime.  Follow up in sputum cultures and blood cultures.    - SIMON on CKD IV:   Creatinine elevated but stable since yesterday. fluid overload present but improving. Pt diuresed well with lasix and continue lasix and monitor renal function and panel. Pt is at high risk for dialysis. Nephrology following.     - Acute tonsillitis  Pt thinks that she has developed diarrhea due to Augmentin.  Discontinue Augmentin and continue doxycycline.     - Acute diastolic CHF exacerbation:   Continues to have fluid overload. Diuresing well.   Echo on 7/4/17 showed an EF of 66-70%, trace  AR, mild MR, mild MS, mild to moderate TR and severe pulmonary HTN.  Diuresed well with Lasix.  Lower extremity edema is unchanged. Cardiology input appreciated.       - Acute exacerbation of COPD with ongoing tobacco abuse:   Improved. Cont steroids, antibiotics and nebs. Symbicort. Cont BiPAP. Nicotine patch ordered. Cont to encourage cessation.   Home bipap arranged.    - Anemia on chronic disease  Due to CKD. Continue epogen.      - Mixed respiratory and metabolic acidosis:   Improved. Pt has been more compliant with BiPAP. Currently on PO sodium bicarb per nephrology. Cont BiPAP and treatment per nephrology.       - Hyperkalemia:   Resolve.  Potassium continues to remain within normal limits.  Continue low potassium diet.      - Essential HTN:   Blood pressure controlled today.  She has been noncompliant with dietary restrictions.  Continue metoprolol and will add when necessary hydralazine.    - Leucocytosis:  Worsened. Likely due to acute tonsillitis/HCAP. Patient is afebrile.  CRP is normal.  Antibiotic coverage broadened.  We'll check sputum cultures and blood cultures.     - DM II, insulin dependent:   HgbA1c 6.1 on 7/3/17.   Blood sugars have been elevated since patient has not been compliant with the dietary restrictions.  And systemic steroids also has been contributing to her hyperglycemia.  Patient had an episode of hypoglycemia on 7/28, her insulin was decreased at that time but then she did develop hyperglycemia.  This morning I found out that patient has been noncompliant with dietary restriction with the family members bringing her food from outside.  This makes her glycemic control challenging due to her erratic and inconsistent calorie consumption.  Therefore, I will add on the side of hyperglycemia.  Continue Levemir 25 units daily at bedtime and continue sliding scale insulin.  Monitor blood sugar with Accu-Cheks before meals and at bedtime..       - Pleural effusions:   R>L. S/P right-sided  thoracentesis. Pleural fluid appears transudative. Cultures negative.  repeat chest x-ray ordered.     - CAD:   Asymptomatic.  Continue aspirin, atorvastatin, Plavix, metoprolol and Imdur. Cardiology following with input appreciated.      - Chronic pain:   Neurontin dose adjusted for decreased creatinine clearance per pharmacy recommendations.     - Anxiety:   Cont klonopin with close monitoring in the setting of respiratory failure.      - Prophylaxis:  Heparin and PPI.     - Fluid, electrolytes and nutrition:  No IVF.   Electrolyte replacement per protocol. Mg is 1.9  Low K+ diet.      Pt is at high risk 2/2 acute on chronic hypoxic respiratory failure, SIMON on CKD IV at high risk for dialysis, CHF exacerbation, COPD exacerbation with ongoing tobacco abuse, mixed acidosis and hx of noncompliance.  Patient has been noncompliant with her prescribed diet restrictions, medications and instructions from physicians and nursing staff.  Patient states that she will be compliant with the restrictions and medications.    Priscilla Juárez MD  08/05/17  9:23 AM

## 2018-09-20 ENCOUNTER — MYC MEDICAL ADVICE (OUTPATIENT)
Dept: NEUROSURGERY | Facility: CLINIC | Age: 68
End: 2018-09-20

## 2018-09-20 DIAGNOSIS — M54.16 LUMBAR RADICULOPATHY: Primary | ICD-10-CM

## 2018-09-20 RX ORDER — GABAPENTIN 300 MG/1
300 CAPSULE ORAL AT BEDTIME
Qty: 90 CAPSULE | Refills: 1 | Status: SHIPPED | OUTPATIENT
Start: 2018-09-20 | End: 2019-01-11

## 2018-09-24 ENCOUNTER — HOSPITAL ENCOUNTER (OUTPATIENT)
Dept: MRI IMAGING | Facility: CLINIC | Age: 68
Discharge: HOME | End: 2018-09-24
Attending: PHYSICIAN ASSISTANT | Admitting: PHYSICIAN ASSISTANT
Payer: MEDICARE

## 2018-09-24 DIAGNOSIS — M54.16 LUMBAR RADICULOPATHY: ICD-10-CM

## 2018-09-24 PROCEDURE — 72148 MRI LUMBAR SPINE W/O DYE: CPT

## 2018-09-25 NOTE — ADDENDUM NOTE
Encounter addended by: Sue Iniguez, PT on: 9/25/2018  5:33 PM<BR>     Actions taken: Flowsheet accepted

## 2018-09-26 ENCOUNTER — MYC MEDICAL ADVICE (OUTPATIENT)
Dept: FAMILY MEDICINE | Facility: OTHER | Age: 68
End: 2018-09-26

## 2018-09-26 DIAGNOSIS — M51.369 DDD (DEGENERATIVE DISC DISEASE), LUMBAR: ICD-10-CM

## 2018-09-26 DIAGNOSIS — G89.4 CHRONIC PAIN SYNDROME: ICD-10-CM

## 2018-09-27 RX ORDER — OXYCODONE AND ACETAMINOPHEN 5; 325 MG/1; MG/1
1-2 TABLET ORAL EVERY 6 HOURS PRN
Qty: 15 TABLET | Refills: 0 | Status: SHIPPED | OUTPATIENT
Start: 2018-09-27 | End: 2018-11-01

## 2018-10-07 ENCOUNTER — MYC MEDICAL ADVICE (OUTPATIENT)
Dept: FAMILY MEDICINE | Facility: OTHER | Age: 68
End: 2018-10-07

## 2018-10-08 ENCOUNTER — HOSPITAL ENCOUNTER (OUTPATIENT)
Dept: PHYSICAL THERAPY | Facility: CLINIC | Age: 68
Setting detail: THERAPIES SERIES
End: 2018-10-08
Attending: PHYSICIAN ASSISTANT
Payer: MEDICARE

## 2018-10-08 PROCEDURE — 40000718 ZZHC STATISTIC PT DEPARTMENT ORTHO VISIT: Performed by: PHYSICAL THERAPIST

## 2018-10-08 PROCEDURE — 97110 THERAPEUTIC EXERCISES: CPT | Mod: GP | Performed by: PHYSICAL THERAPIST

## 2018-10-11 ENCOUNTER — MYC MEDICAL ADVICE (OUTPATIENT)
Dept: ONCOLOGY | Facility: CLINIC | Age: 68
End: 2018-10-11

## 2018-10-22 ENCOUNTER — HOSPITAL ENCOUNTER (OUTPATIENT)
Dept: PHYSICAL THERAPY | Facility: CLINIC | Age: 68
Setting detail: THERAPIES SERIES
End: 2018-10-22
Attending: PHYSICIAN ASSISTANT
Payer: MEDICARE

## 2018-10-22 PROCEDURE — 40000718 ZZHC STATISTIC PT DEPARTMENT ORTHO VISIT

## 2018-10-22 PROCEDURE — 97110 THERAPEUTIC EXERCISES: CPT | Mod: GP

## 2018-10-22 PROCEDURE — 97750 PHYSICAL PERFORMANCE TEST: CPT | Mod: GP

## 2018-10-24 ENCOUNTER — HOSPITAL ENCOUNTER (OUTPATIENT)
Dept: PHYSICAL THERAPY | Facility: CLINIC | Age: 68
Setting detail: THERAPIES SERIES
End: 2018-10-24
Attending: PHYSICIAN ASSISTANT
Payer: MEDICARE

## 2018-10-24 PROCEDURE — 97140 MANUAL THERAPY 1/> REGIONS: CPT | Mod: GP

## 2018-10-24 PROCEDURE — 40000718 ZZHC STATISTIC PT DEPARTMENT ORTHO VISIT

## 2018-10-24 PROCEDURE — 97110 THERAPEUTIC EXERCISES: CPT | Mod: GP

## 2018-10-25 ENCOUNTER — OFFICE VISIT (OUTPATIENT)
Dept: NEUROSURGERY | Facility: CLINIC | Age: 68
End: 2018-10-25
Payer: COMMERCIAL

## 2018-10-25 VITALS
DIASTOLIC BLOOD PRESSURE: 66 MMHG | HEIGHT: 66 IN | OXYGEN SATURATION: 98 % | SYSTOLIC BLOOD PRESSURE: 104 MMHG | HEART RATE: 94 BPM | WEIGHT: 138 LBS | BODY MASS INDEX: 22.18 KG/M2

## 2018-10-25 DIAGNOSIS — M54.16 LUMBAR RADICULOPATHY: Primary | ICD-10-CM

## 2018-10-25 PROCEDURE — 99214 OFFICE O/P EST MOD 30 MIN: CPT | Performed by: NEUROLOGICAL SURGERY

## 2018-10-25 ASSESSMENT — PAIN SCALES - GENERAL: PAINLEVEL: MILD PAIN (3)

## 2018-10-25 NOTE — LETTER
10/25/2018         RE: Michaela Dorman  [address]  [facility] [address]        Dear Colleague,    Thank you for referring your patient, Michaela Dorman, to the [facility]. Please see a copy of my visit note below.    Michaela Dorman is a 67 year old female who presents for evaluation of her chief complaint of left leg and low back pain. Symptoms have been present for about 1 year, but have become more persistent over the last 4 months. She describes severe pain that radiates down the lateral aspect of her left thigh and calf, as far as the ankle. She has severe ankle pain. She even received an in injection into the left ankle, with no symptomatic improvement. She has not had any epidural injections. She is unable to stand or participate in any significant activities. She denies bowel or bladder problems, or problems with balance or coordination.    Returns today for further follow-up.  Had several days of excellent pain relief after the injection, although the pain has returned.  Scoliosis x-rays showed that the deformity is all local in the caudal lumbar region.  Her overall activity level is fairly good, she takes half an oxycodone in the morning, then is able to walk for extended distances on the treadmill.    Returns for follow-up.  At this point, having low back pain radiating to the left posterior thigh and calf, to the ankle. Most mornings, lifts weights, than walks 3 miles, but subsequently needs an oxycodone tablet to do most meaningful activities through the rest of the day.    Past Medical History:   Diagnosis Date     Central stenosis of spinal canal 12/1/2017    lumbar      DDD (degenerative disc disease), lumbar 12/1/2017     Depressive disorder, not elsewhere classified      Esophageal reflux      ETOH abuse     in remission     Foraminal stenosis of lumbar region 12/1/2017    Complete lumbar spine left at various degrees and to a lesser extent the right as well.      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    Past Surgical History:   Procedure Laterality Date     ARTHROPLASTY SHOULDER Right 11/17/2015     ARTHROSCOPY KNEE  6/7/2012    Procedure:ARTHROSCOPY KNEE; right knee arthroscopy with partial lateral menisectomy; Surgeon:DAMIÁN MCALLISTER; Location:[facility] OR     C LIGATE FALLOPIAN TUBE       C NONSPECIFIC PROCEDURE  1956    ankle fracture surgery     COLONOSCOPY N/A 12/22/2017    Procedure: COLONOSCOPY;  colonoscopy;  Surgeon: Chuck Chand MD;  Location: [facility] GI     EXCISE MASS AXILLA Left 9/16/2016    Procedure: EXCISE MASS AXILLA;  Surgeon: Keyon Blanco MD;  Location: [facility] OR     HC REMV CATARACT EXTRACAP,INSERT LENS  6/25/2009    Right eye     INSERT PORT VASCULAR ACCESS Right 10/7/2016    Procedure: INSERT PORT VASCULAR ACCESS;  Surgeon: Keyon Blanco MD;  Location: [facility] OR     MASTECTOMY SIMPLE BILATERAL Bilateral 2/8/2017    Procedure: MASTECTOMY SIMPLE BILATERAL;  Surgeon: Keyon Blanco MD;  Location: [facility]     OPEN REDUCTION INTERNAL FIXATION FOOT Right 3/20/2015    Procedure: OPEN REDUCTION INTERNAL FIXATION FOOT;  Surgeon: Javi Herrmann DPM;  Location: [facility]     REMOVE PORT VASCULAR ACCESS Right 2/14/2018    Procedure: REMOVE PORT VASCULAR ACCESS;  right intra jugular port removal;  Surgeon: Keyon Blanco MD;  Location: [facility] OR     SHOULDER SURGERY Right 11/2015     Past Surgical History reviewed with patient during visit.    Current Outpatient Prescriptions   Medication     acetaminophen (TYLENOL EX ST ARTHRITIS PAIN) 500 MG tablet     atorvastatin (LIPITOR) 20 MG tablet     FLUoxetine (PROZAC) 40 MG capsule     Lansoprazole (PREVACID PO)     oxyCODONE-acetaminophen (PERCOCET) 5-325 MG per tablet      "prochlorperazine (COMPAZINE) 10 MG tablet     ALPRAZolam (XANAX) 0.5 MG tablet     fluocinonide (LIDEX) 0.05 % cream     gabapentin (NEURONTIN) 300 MG capsule     hydrochlorothiazide (HYDRODIURIL) 25 MG tablet     oxybutynin (DITROPAN) 5 MG tablet     No current facility-administered medications for this visit.        Allergies   Allergen Reactions     No Known Drug Allergies        Social History     Social History     Marital status:      Spouse name: ozzie     Number of children: 5     Years of education: N/A     Occupational History      Homemaker     Social History Main Topics     Smoking status: Former Smoker     Packs/day: 3.00     Years: 10.00     Types: Cigarettes     Quit date: 12/1/1979     Smokeless tobacco: Never Used      Comment: approx. 3 packs daily     Alcohol use 0.0 oz/week     0 Standard drinks or equivalent per week      Comment: daily glass of wine     Drug use: No     Sexual activity: Yes     Partners: Male     Other Topics Concern     Caffeine Concern No     Hobby Hazards No     Sleep Concern No     Stress Concern Yes     Exercise Yes     Seat Belt Yes     Social History Narrative       Family History   Problem Relation Age of Onset     Hypertension Mother      Thyroid Disease Mother      Cerebrovascular Disease Mother      Hypertension Father      Cerebrovascular Disease Father      Cancer Father      skin     Thyroid Disease Sister      Diabetes Brother      type 2     Depression Sister      Breast Cancer Sister      age 47     Cancer Sister      skin     Cancer Brother      inside nose          ROS: 10 point ROS neg other than the symptoms noted above in the HPI.    Vital Signs: /66 (BP Location: Right arm, Patient Position: Chair, Cuff Size: Adult Large)  Pulse 94  Ht 1.676 m (5' 6\")  Wt 62.6 kg (138 lb)  SpO2 98%  BMI 22.27 kg/m2    Examination:  Constitutional:  Alert, well nourished, NAD.  HEENT: Normocephalic, atraumatic.   Pulmonary:  Without shortness of breath, " normal effort.   Lymph: no lymphadenopathy to low back or LE.   Integumentary: Skin is free of rashes or lesions.   Cardiovascular:  No pitting edema of BLE.    Psych: Normal affect, no apparent distress    Neurological:  Awake  Alert  Oriented x 3  Speech clear  Cranial nerves II - XII grossly intact  Motor exam   Hip Flexor:                Right: 5/5  Left:  5/5  Hip Adductor:             Right:  5/5  Left:  5/5  Hip Abductor:             Right:  5/5  Left:  5/5  Gastroc Soleus:        Right:  5/5  Left:  5/5  Tib/Ant:                      Right:  5/5  Left:  5/5  EHL:                          Right:  5/5  Left:  5/5       Sensation normal to bilateral upper and lower extremities.    Reflexes are 2+ in the patellar and Achilles. There is no clonus. Downgoing Babinski.    Musculoskeletal:  Gait: Able to stand from a seated position. Normal non-antalgic, non-myelopathic gait.  Able to heel/toe walk without loss of balance  She is tender palpation of the lumbar paraspinous musculature.    Imaging:   MRI of the lumbar spine was reviewed in the office today. She does have multiple levels of disc degeneration and disc bulging from L2 through S1. She has multiple levels of moderate to severe foraminal stenosis from L4 through S1. Disc degeneration is most significant at L4-5 and L5-S1, where there is loss of disc height and erosion of the endplates.    Assessment/Plan:     Low back pain  Radicular left leg pain  Lumbar disc degeneration multilevel  Lumbar stenosis    New MRI with stable lumbar changes and stenosis in the lower lumbar spine  Significant local deformity in the lumbar spine  Will obtain LLE EMG to see if a single affected nerve root can be identified for targeted minimally invasive decompression  Otherwise, I counseled the patient that an L2-S1 fusion would probably be too significant a surgery given her current level of function    Again, thank you for allowing me to participate in the care of your  patient.        Sincerely,        Lenny Gomes MD

## 2018-10-25 NOTE — PATIENT INSTRUCTIONS
1. Ordered a left leg EMG. Call Main Office Phone: [phone] to schedule. We do them here in the specialty center through [facility].      Please call our clinic with any questions or concerns: [phone]    COLE Garcia

## 2018-10-25 NOTE — NURSING NOTE
"Michaela Dorman is a 68 year old female who presents for:  Chief Complaint   Patient presents with     Neurologic Problem     chronic bilateral low back pain        Initial Vitals:  /66 (BP Location: Right arm, Patient Position: Chair, Cuff Size: Adult Large)  Pulse 94  Ht 5' 6\" (1.676 m)  Wt 138 lb (62.6 kg)  SpO2 98%  BMI 22.27 kg/m2 Estimated body mass index is 22.27 kg/(m^2) as calculated from the following:    Height as of this encounter: 5' 6\" (1.676 m).    Weight as of this encounter: 138 lb (62.6 kg).. Body surface area is 1.71 meters squared. BP completed using cuff size: large  Mild Pain (3)    Do you feel safe in your environment?  Yes  Do you need any refills today? No    Nursing Comments:         Debbie Mcleod    "

## 2018-10-25 NOTE — MR AVS SNAPSHOT
After Visit Summary   10/25/2018    Michaela Dorman    MRN: [id number]           Patient Information     Date Of Birth          1950        Visit Information        Provider Department      10/25/2018 1:00 PM Lenny Gomes MD [facility]        Today's Diagnoses     Lumbar radiculopathy    -  1      Care Instructions    1. Ordered a left leg EMG. Call Main Office Phone: [phone] to schedule. We do them here in the specialty center through [facility].      Please call our clinic with any questions or concerns: [phone]    COLE Garcia          Follow-ups after your visit        Your next 10 appointments already scheduled     Oct 31, 2018  1:00 PM CDT   Ortho Treatment with Emiliana Kate PT   [facility] ([facility])    [address] [place] [address] [place] [address]   [phone]            Nov 12, 2018  1:00 PM CST   Ortho Treatment with Emiliana Kate PT   [facility] ([facility])    [address] [place] [address] [address] [place] [address]   [phone]            Nov 15, 2018 10:00 AM CST   LAB with NL LAB [facility] ([facility])    [address] [place] [address]   [phone]           Please do not eat 10-12 hours before your appointment if you are coming in fasting for labs on lipids, cholesterol, or glucose (sugar). This does not apply to pregnant women. Water, hot tea and black coffee (with nothing added) are okay. Do not drink other fluids, diet soda or chew gum.            Nov 20, 2018  9:00 AM CST   Ortho Treatment with Emiliana Kate PT   [facility] ([facility])    [address] [place] [address] [address] [place] [address]   [phone]            Nov 20, 2018 10:00 AM CST   Return Visit with Syeda Ferguson MD   [facility]    [address] [place] [address]  "[facility] [address]   [phone]            Nov 21, 2018  1:30 PM CST   Nurse Only with NURSE ONLY MG DERM   [facility]    [address] [place] [address] [phone]            Dec 11, 2018  1:00 PM CST   Return Visit with Yecenia Castle MD   [facility] [place]    [id number] [address] [facility] [address] [phone]              Who to contact     If you have questions or need follow up information about today's clinic visit or your schedule please contact [facility] directly at [phone].  Normal or non-critical lab and imaging results will be communicated to you by MyChart, letter or phone within 4 business days after the clinic has received the results. If you do not hear from us within 7 days, please contact the clinic through MyChart or phone. If you have a critical or abnormal lab result, we will notify you by phone as soon as possible.  Submit refill requests through [employer] or call your pharmacy and they will forward the refill request to us. Please allow 3 business days for your refill to be completed.          Additional Information About Your Visit        [employer] Information     [employer] gives you secure access to your electronic health record. If you see a primary care provider, you can also send messages to your care team and make appointments. If you have questions, please call your primary care clinic.  If you do not have a primary care provider, please call [phone] and they will assist you.        Care EveryWhere ID     This is your Care EveryWhere ID. This could be used by other organizations to access your [facility] medical records  [id number]        Your Vitals Were     Pulse Height Pulse Oximetry BMI (Body Mass Index)          94 5' 6\" (1.676 m) 98% 22.27 kg/m2         Blood Pressure from Last 3 Encounters:   10/25/18 104/66   09/07/18 130/58 "   08/02/18 112/76    Weight from Last 3 Encounters:   10/25/18 138 lb (62.6 kg)   09/07/18 141 lb 1.6 oz (64 kg)   06/05/18 143 lb 9.6 oz (65.1 kg)              Today, you had the following     No orders found for display       Primary Care Provider Office Phone # Fax #    Phani Tapia PA-C [phone] [address] [facility] [address]        Equal Access to Services     SHIVANI ARTIS : Hadii aad ku hadasho Soomaali, waaxda luqadaha, qaybta kaalmada adeegyada, waxay idiin hayaan ademae khveronica allen . So [facility] [phone].    ATENCIÓN: Si habla español, tiene a ordoñez disposición servicios gratuitos de asistencia lingüística. [facility] [phone].    We comply with applicable federal civil rights laws and [place] laws. We do not discriminate on the basis of race, color, national origin, age, disability, sex, sexual orientation, or gender identity.            Thank you!     Thank you for choosing [facility]  for your care. Our goal is always to provide you with excellent care. Hearing back from our patients is one way we can continue to improve our services. Please take a few minutes to complete the written survey that you may receive in the mail after your visit with us. Thank you!             Your Updated Medication List - Protect others around you: Learn how to safely use, store and throw away your medicines at www.disposemymeds.org.          This list is accurate as of 10/25/18  1:49 PM.  Always use your most recent med list.                   Brand Name Dispense Instructions for use Diagnosis    ALPRAZolam 0.5 MG tablet    XANAX    30 tablet    Take 1 tablet (0.5 mg) by mouth 3 times daily as needed for anxiety    Anxiety       atorvastatin 20 MG tablet    LIPITOR    30 tablet    TAKE ONE TABLET BY MOUTH EVERY DAY    Hyperlipidemia LDL goal <130       fluocinonide 0.05 % cream    LIDEX    30 g    Apply thin layer twice daily to spots on left hand until resolved or up to  two weeks.    Prurigo nodularis, Nonvenomous arthropod as cause of injury       FLUoxetine 40 MG capsule    PROzac    90 capsule    Take 1 capsule (40 mg) by mouth daily    Adjustment disorder with depressed mood       gabapentin 300 MG capsule    NEURONTIN    90 capsule    Take 1 capsule (300 mg) by mouth At Bedtime    Lumbar radiculopathy       hydrochlorothiazide 25 MG tablet    HYDRODIURIL    90 tablet    Take 1 tablet (25 mg) by mouth daily    Essential hypertension       oxybutynin 5 MG tablet    DITROPAN    90 tablet    TAKE ONE-HALF TABLET BY MOUTH TWICE A DAY    Dysuria       oxyCODONE-acetaminophen 5-325 MG per tablet    PERCOCET    15 tablet    Take 1-2 tablets by mouth every 6 hours as needed for moderate to severe pain (#15 to last 30 days)    Chronic pain syndrome, DDD (degenerative disc disease), lumbar       PREVACID PO      Take 40 mg by mouth        prochlorperazine 10 MG tablet    COMPAZINE    30 tablet    TAKE ONE TABLET BY MOUTH EVERY 6 HOURS AS NEEDED (NAUSEA/VOMITING)    Encounter for antineoplastic chemotherapy       TYLENOL EX ST ARTHRITIS PAIN 500 MG tablet   Generic drug:  acetaminophen      Take 500-1,000 mg by mouth every 6 hours as needed for mild pain    Dermatitis due to sun

## 2018-10-25 NOTE — PROGRESS NOTES
Michaela Dorman is a 67 year old female who presents for evaluation of her chief complaint of left leg and low back pain. Symptoms have been present for about 1 year, but have become more persistent over the last 4 months. She describes severe pain that radiates down the lateral aspect of her left thigh and calf, as far as the ankle. She has severe ankle pain. She even received an in injection into the left ankle, with no symptomatic improvement. She has not had any epidural injections. She is unable to stand or participate in any significant activities. She denies bowel or bladder problems, or problems with balance or coordination.    Returns today for further follow-up.  Had several days of excellent pain relief after the injection, although the pain has returned.  Scoliosis x-rays showed that the deformity is all local in the caudal lumbar region.  Her overall activity level is fairly good, she takes half an oxycodone in the morning, then is able to walk for extended distances on the treadmill.    Returns for follow-up.  At this point, having low back pain radiating to the left posterior thigh and calf, to the ankle. Most mornings, lifts weights, than walks 3 miles, but subsequently needs an oxycodone tablet to do most meaningful activities through the rest of the day.    Past Medical History:   Diagnosis Date     Central stenosis of spinal canal 12/1/2017    lumbar      DDD (degenerative disc disease), lumbar 12/1/2017     Depressive disorder, not elsewhere classified      Esophageal reflux      ETOH abuse     in remission     Foraminal stenosis of lumbar region 12/1/2017    Complete lumbar spine left at various degrees and to a lesser extent the right as well.     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    Past Surgical History:   Procedure Laterality Date     ARTHROPLASTY SHOULDER Right 11/17/2015     ARTHROSCOPY KNEE  6/7/2012    Procedure:ARTHROSCOPY KNEE; right knee arthroscopy with partial lateral menisectomy; Surgeon:DAMIÁN MCALLISTER; Location:[facility] OR     C LIGATE FALLOPIAN TUBE       C NONSPECIFIC PROCEDURE  1956    ankle fracture surgery     COLONOSCOPY N/A 12/22/2017    Procedure: COLONOSCOPY;  colonoscopy;  Surgeon: Chuck Chand MD;  Location: [facility]     EXCISE MASS AXILLA Left 9/16/2016    Procedure: EXCISE MASS AXILLA;  Surgeon: Keyon Blanco MD;  Location: [facility] OR     HC REMV CATARACT EXTRACAP,INSERT LENS  6/25/2009    Right eye     INSERT PORT VASCULAR ACCESS Right 10/7/2016    Procedure: INSERT PORT VASCULAR ACCESS;  Surgeon: Keyon Blanco MD;  Location: [facility]     MASTECTOMY SIMPLE BILATERAL Bilateral 2/8/2017    Procedure: MASTECTOMY SIMPLE BILATERAL;  Surgeon: Keyon Blanco MD;  Location: [facility]     OPEN REDUCTION INTERNAL FIXATION FOOT Right 3/20/2015    Procedure: OPEN REDUCTION INTERNAL FIXATION FOOT;  Surgeon: Javi Herrmann DPM;  Location: [facility]     REMOVE PORT VASCULAR ACCESS Right 2/14/2018    Procedure: REMOVE PORT VASCULAR ACCESS;  right intra jugular port removal;  Surgeon: Keyon Blanco MD;  Location: [facility] OR     SHOULDER SURGERY Right 11/2015     Past Surgical History reviewed with patient during visit.    Current Outpatient Prescriptions   Medication     acetaminophen (TYLENOL EX ST ARTHRITIS PAIN) 500 MG tablet     atorvastatin (LIPITOR) 20 MG tablet     FLUoxetine (PROZAC) 40 MG capsule     Lansoprazole (PREVACID PO)     oxyCODONE-acetaminophen (PERCOCET) 5-325 MG per tablet     prochlorperazine (COMPAZINE) 10 MG tablet     ALPRAZolam (XANAX) 0.5 MG tablet     fluocinonide (LIDEX) 0.05 % cream     gabapentin (NEURONTIN) 300 MG capsule     hydrochlorothiazide (HYDRODIURIL) 25 MG tablet     oxybutynin (DITROPAN) 5 MG tablet     No  "current facility-administered medications for this visit.        Allergies   Allergen Reactions     No Known Drug Allergies        Social History     Social History     Marital status:      Spouse name: ozzie     Number of children: 5     Years of education: N/A     Occupational History      Homemaker     Social History Main Topics     Smoking status: Former Smoker     Packs/day: 3.00     Years: 10.00     Types: Cigarettes     Quit date: 12/1/1979     Smokeless tobacco: Never Used      Comment: approx. 3 packs daily     Alcohol use 0.0 oz/week     0 Standard drinks or equivalent per week      Comment: daily glass of wine     Drug use: No     Sexual activity: Yes     Partners: Male     Other Topics Concern     Caffeine Concern No     Hobby Hazards No     Sleep Concern No     Stress Concern Yes     Exercise Yes     Seat Belt Yes     Social History Narrative       Family History   Problem Relation Age of Onset     Hypertension Mother      Thyroid Disease Mother      Cerebrovascular Disease Mother      Hypertension Father      Cerebrovascular Disease Father      Cancer Father      skin     Thyroid Disease Sister      Diabetes Brother      type 2     Depression Sister      Breast Cancer Sister      age 47     Cancer Sister      skin     Cancer Brother      inside nose          ROS: 10 point ROS neg other than the symptoms noted above in the HPI.    Vital Signs: /66 (BP Location: Right arm, Patient Position: Chair, Cuff Size: Adult Large)  Pulse 94  Ht 1.676 m (5' 6\")  Wt 62.6 kg (138 lb)  SpO2 98%  BMI 22.27 kg/m2    Examination:  Constitutional:  Alert, well nourished, NAD.  HEENT: Normocephalic, atraumatic.   Pulmonary:  Without shortness of breath, normal effort.   Lymph: no lymphadenopathy to low back or LE.   Integumentary: Skin is free of rashes or lesions.   Cardiovascular:  No pitting edema of BLE.    Psych: Normal affect, no apparent distress    Neurological:  Awake  Alert  Oriented x 3  Speech " clear  Cranial nerves II - XII grossly intact  Motor exam   Hip Flexor:                Right: 5/5  Left:  5/5  Hip Adductor:             Right:  5/5  Left:  5/5  Hip Abductor:             Right:  5/5  Left:  5/5  Gastroc Soleus:        Right:  5/5  Left:  5/5  Tib/Ant:                      Right:  5/5  Left:  5/5  EHL:                          Right:  5/5  Left:  5/5       Sensation normal to bilateral upper and lower extremities.    Reflexes are 2+ in the patellar and Achilles. There is no clonus. Downgoing Babinski.    Musculoskeletal:  Gait: Able to stand from a seated position. Normal non-antalgic, non-myelopathic gait.  Able to heel/toe walk without loss of balance  She is tender palpation of the lumbar paraspinous musculature.    Imaging:   MRI of the lumbar spine was reviewed in the office today. She does have multiple levels of disc degeneration and disc bulging from L2 through S1. She has multiple levels of moderate to severe foraminal stenosis from L4 through S1. Disc degeneration is most significant at L4-5 and L5-S1, where there is loss of disc height and erosion of the endplates.    Assessment/Plan:     Low back pain  Radicular left leg pain  Lumbar disc degeneration multilevel  Lumbar stenosis    New MRI with stable lumbar changes and stenosis in the lower lumbar spine  Significant local deformity in the lumbar spine  Will obtain LLE EMG to see if a single affected nerve root can be identified for targeted minimally invasive decompression  Otherwise, I counseled the patient that an L2-S1 fusion would probably be too significant a surgery given her current level of function

## 2018-10-31 ENCOUNTER — MYC MEDICAL ADVICE (OUTPATIENT)
Dept: FAMILY MEDICINE | Facility: OTHER | Age: 68
End: 2018-10-31

## 2018-10-31 DIAGNOSIS — M51.369 DDD (DEGENERATIVE DISC DISEASE), LUMBAR: ICD-10-CM

## 2018-10-31 DIAGNOSIS — G89.4 CHRONIC PAIN SYNDROME: ICD-10-CM

## 2018-11-01 RX ORDER — OXYCODONE AND ACETAMINOPHEN 5; 325 MG/1; MG/1
1-2 TABLET ORAL EVERY 6 HOURS PRN
Qty: 15 TABLET | Refills: 0 | Status: SHIPPED | OUTPATIENT
Start: 2018-11-01 | End: 2019-01-11

## 2018-11-05 NOTE — ADDENDUM NOTE
Encounter addended by: Sue Iniguez, PT on: 11/5/2018  1:56 PM<BR>     Actions taken: Flowsheet accepted, Charge Capture section accepted

## 2018-11-15 DIAGNOSIS — C50.412 MALIGNANT NEOPLASM OF UPPER-OUTER QUADRANT OF LEFT FEMALE BREAST, UNSPECIFIED ESTROGEN RECEPTOR STATUS (H): ICD-10-CM

## 2018-11-15 LAB
ALBUMIN SERPL-MCNC: 4 G/DL (ref 3.4–5)
ALP SERPL-CCNC: 48 U/L (ref 40–150)
ALT SERPL W P-5'-P-CCNC: 42 U/L (ref 0–50)
ANION GAP SERPL CALCULATED.3IONS-SCNC: 11 MMOL/L (ref 3–14)
AST SERPL W P-5'-P-CCNC: 31 U/L (ref 0–45)
BASOPHILS # BLD AUTO: 0 10E9/L (ref 0–0.2)
BASOPHILS NFR BLD AUTO: 0.6 %
BILIRUB SERPL-MCNC: 0.4 MG/DL (ref 0.2–1.3)
BUN SERPL-MCNC: 27 MG/DL (ref 7–30)
CALCIUM SERPL-MCNC: 9.5 MG/DL (ref 8.5–10.1)
CANCER AG27-29 SERPL-ACNC: 18 U/ML (ref 0–39)
CHLORIDE SERPL-SCNC: 101 MMOL/L (ref 94–109)
CO2 SERPL-SCNC: 29 MMOL/L (ref 20–32)
CREAT SERPL-MCNC: 1.17 MG/DL (ref 0.52–1.04)
DIFFERENTIAL METHOD BLD: NORMAL
EOSINOPHIL NFR BLD AUTO: 2.7 %
ERYTHROCYTE [DISTWIDTH] IN BLOOD BY AUTOMATED COUNT: 13.8 % (ref 10–15)
GFR SERPL CREATININE-BSD FRML MDRD: 46 ML/MIN/1.7M2
GLUCOSE SERPL-MCNC: 81 MG/DL (ref 70–99)
HCT VFR BLD AUTO: 39.1 % (ref 35–47)
HGB BLD-MCNC: 12.8 G/DL (ref 11.7–15.7)
IMM GRANULOCYTES # BLD: 0 10E9/L (ref 0–0.4)
IMM GRANULOCYTES NFR BLD: 0.4 %
LYMPHOCYTES # BLD AUTO: 1.6 10E9/L (ref 0.8–5.3)
LYMPHOCYTES NFR BLD AUTO: 24 %
MCH RBC QN AUTO: 32.7 PG (ref 26.5–33)
MCHC RBC AUTO-ENTMCNC: 32.7 G/DL (ref 31.5–36.5)
MCV RBC AUTO: 100 FL (ref 78–100)
MONOCYTES # BLD AUTO: 0.5 10E9/L (ref 0–1.3)
MONOCYTES NFR BLD AUTO: 7.4 %
NEUTROPHILS # BLD AUTO: 4.4 10E9/L (ref 1.6–8.3)
NEUTROPHILS NFR BLD AUTO: 64.9 %
NRBC # BLD AUTO: 0 10*3/UL
NRBC BLD AUTO-RTO: 0 /100
PLATELET # BLD AUTO: 268 10E9/L (ref 150–450)
POTASSIUM SERPL-SCNC: 3.8 MMOL/L (ref 3.4–5.3)
PROT SERPL-MCNC: 7.5 G/DL (ref 6.8–8.8)
RBC # BLD AUTO: 3.92 10E12/L (ref 3.8–5.2)
SODIUM SERPL-SCNC: 141 MMOL/L (ref 133–144)
WBC # BLD AUTO: 6.7 10E9/L (ref 4–11)

## 2018-11-15 PROCEDURE — 85025 COMPLETE CBC W/AUTO DIFF WBC: CPT | Performed by: INTERNAL MEDICINE

## 2018-11-15 PROCEDURE — 80053 COMPREHEN METABOLIC PANEL: CPT | Performed by: INTERNAL MEDICINE

## 2018-11-15 PROCEDURE — 86300 IMMUNOASSAY TUMOR CA 15-3: CPT | Performed by: INTERNAL MEDICINE

## 2018-11-15 PROCEDURE — 36415 COLL VENOUS BLD VENIPUNCTURE: CPT | Performed by: INTERNAL MEDICINE

## 2018-11-18 ENCOUNTER — TELEPHONE (OUTPATIENT)
Dept: FAMILY MEDICINE | Facility: CLINIC | Age: 68
End: 2018-11-18

## 2018-11-18 DIAGNOSIS — E78.5 HYPERLIPIDEMIA LDL GOAL <130: ICD-10-CM

## 2018-11-18 NOTE — TELEPHONE ENCOUNTER
Michaela is requesting a refill of Flomax. I do not see this medication in her Epic Medication tab.     Thanks  Luz Marina Davenport [facility]   [phone]

## 2018-11-20 ENCOUNTER — ONCOLOGY VISIT (OUTPATIENT)
Dept: ONCOLOGY | Facility: CLINIC | Age: 68
End: 2018-11-20
Payer: COMMERCIAL

## 2018-11-20 VITALS
HEART RATE: 88 BPM | SYSTOLIC BLOOD PRESSURE: 128 MMHG | RESPIRATION RATE: 18 BRPM | HEIGHT: 66 IN | WEIGHT: 137.2 LBS | BODY MASS INDEX: 22.05 KG/M2 | OXYGEN SATURATION: 97 % | TEMPERATURE: 97.9 F | DIASTOLIC BLOOD PRESSURE: 62 MMHG

## 2018-11-20 DIAGNOSIS — E78.5 HYPERLIPIDEMIA LDL GOAL <130: ICD-10-CM

## 2018-11-20 DIAGNOSIS — F43.21 ADJUSTMENT DISORDER WITH DEPRESSED MOOD: ICD-10-CM

## 2018-11-20 DIAGNOSIS — C50.412 MALIGNANT NEOPLASM OF UPPER-OUTER QUADRANT OF LEFT FEMALE BREAST, UNSPECIFIED ESTROGEN RECEPTOR STATUS (H): Primary | ICD-10-CM

## 2018-11-20 DIAGNOSIS — I10 ESSENTIAL HYPERTENSION: ICD-10-CM

## 2018-11-20 PROCEDURE — 99214 OFFICE O/P EST MOD 30 MIN: CPT | Performed by: INTERNAL MEDICINE

## 2018-11-20 RX ORDER — ATORVASTATIN CALCIUM 20 MG/1
TABLET, FILM COATED ORAL
Qty: 90 TABLET | Refills: 0 | Status: SHIPPED | OUTPATIENT
Start: 2018-11-20 | End: 2019-01-11

## 2018-11-20 ASSESSMENT — PAIN SCALES - GENERAL: PAINLEVEL: NO PAIN (0)

## 2018-11-20 NOTE — NURSING NOTE
DISCHARGE PLAN:  Next appointments: See patient instruction section  Departure Mode: Ambulatory  Accompanied by: self  3 minutes for nursing discharge (face to face time)     Michaela Dorman is here today for Oncology follow up.  Writing nurse seen patient after Medical Oncology appointment to address questions/concerns/coordinate care. Patient to follow up in 6 months with labs couple days prior.  Patient ambulated by nurse to  to schedule follow up and/or lab appointments.  Imaging scheduled if ordered.  See patient instructions and Oncologist's Progress note for further details. Questions and concerns addressed to patient's satisfaction. Patient verbalized and demonstrated understanding of plan.  Contact information provided and patient is encouraged to call with any that arise in the interim of care.    Juan José Hernandez, RN, BSN, OCN   Oncology Care Coordinator [facility]  [phone]  11/20/2018, 10:34 AM

## 2018-11-20 NOTE — TELEPHONE ENCOUNTER
Lipitor  Routing refill request to provider for review/approval because:  Appears to be a break in medication - should have been out around 10/5/18    Next 5 appointments (look out 90 days)     Nov 20, 2018 10:00 AM CST   [facility] with Syeda Ferguson MD   [facility]    [address] [facility] [address]   [phone]            Nov 21, 2018  1:30 PM CST   Nurse Only with NURSE ONLY MG DERM   [facility])    [address] [place] [address] [phone]            Dec 11, 2018  1:00 PM CST   Return Visit with Yecenia Castle MD   [facility])    [address] [place] [address]   [phone]                Mallika Barahona, RN, BSN

## 2018-11-20 NOTE — PATIENT INSTRUCTIONS
Today:  Continue with plan of care    Please follow up with Dr. Ferguson in 6 months.  Please schedule labs 1-5 days prior to follow up appointment.    Lab Date/Time:    Office visit follow up with Dr. Ferguson Date/Time:     If you have any questions or concerns please feel free to call.    If you need to reschedule please call:  Clinic or Lab Appointment - [phone]  Infusion - [phone]  Imaging - [phone]    Juan José Hernandez RN, BSN, OCN  [facility]   Oncology/Hematology Care Coordinator RN  [facility]  [phone]

## 2018-11-20 NOTE — NURSING NOTE
Oncology Symptom Checklist    Unusual Bruising/Bleeding: No  Skin issues or swelling: No Concerns  Fever/Chills:  No  Nausea/Vomiting: No  Hard or Loose stools: No  SOB/Respiratory(Cough): No Concerns  Mouth Sores: No  Able to drink 6 to 8 glasses of fluid in a day: No Concerns  Weight loss:  No Concerns  Weakness: No  Fatigue: No  Light Headed or Dizzy: No  Cognitive Disturbance-Memory: No  Neuropathy/Numbness&Tingling-Hands/Feet: No  Night Sweats:  No  Sleep Concerns: No Concerns    Clinical concerns: See above. Concerns reviewed with Dr. Marty BLEDSOE CMA

## 2018-11-20 NOTE — ASSESSMENT & PLAN NOTE
Michaela Dorman  initially felt a mass in her left axilla. She then underwent mammogram which showed dense breasts with no suspicious lesions in 4/2016. She was then seen by surgery and underwent excisional LN biopsy that showed metastatic high-grade poorly differentiated carcinoma with a tumor size of 2.5 cm. Immunohistochemistry was done for ER/MN receptors that came back both negative. HER-2/boubacar was amplified by FISH. She then underwent MRI of the breast on 9/26/2016 that showed suspicious abnormality with multicentric left sided breast cancer that involved the left lateral breast form the nipple to the chest wall. A PET scan was obtained on 10/5/2016 that showed a hypermetabolic opacity in the left axilla wihtout other pathological activity. It was then recommended that she undergo neoadjuvant chemotherapy with Cytoxan and Doxorubicin that will be followed by Taxol, Herceptin. Patient initiated treatment on 10/11/2016.

## 2018-11-20 NOTE — MR AVS SNAPSHOT
After Visit Summary   11/20/2018    Michaela Dorman    MRN: [id number]           Patient Information     Date Of Birth          1950        Visit Information        Provider Department      11/20/2018 10:00 AM Syeda Ferguson MD [facility]        Today's Diagnoses     Malignant neoplasm of upper-outer quadrant of left female breast, unspecified estrogen receptor status (H)    -  1      Care Instructions    Today:  Continue with plan of care    Please follow up with Dr. Ferguson in 6 months.  Please schedule labs 1-5 days prior to follow up appointment.    Lab Date/Time:    Office visit follow up with Dr. Ferguson Date/Time:     If you have any questions or concerns please feel free to call.    If you need to reschedule please call:  Clinic or Lab Appointment - [phone]  Infusion - [phone]  Imaging - [phone]    Juan José Hernandez RN, BSN, OCN  [facility]   Oncology/Hematology Care Coordinator RN  [facility]  [phone]                    Follow-ups after your visit        Your next 10 appointments already scheduled     Nov 21, 2018  1:30 PM CST   Nurse Only with NURSE ONLY MG DERM   [facility])    [address] [place] [id number] [phone]            Dec 11, 2018  1:00 PM CST   Return Visit with Yecenia Castle MD   [facility])    [id number] [place] [id number] [phone]              Future tests that were ordered for you today     Open Future Orders        Priority Expected Expires Ordered    CBC with platelets differential Routine 5/20/2019 11/20/2019 11/20/2018    Comprehensive metabolic panel Routine 5/20/2019 11/20/2019 11/20/2018    Ca27.29  breast tumor marker Routine 5/20/2019 11/20/2019 11/20/2018            Who to contact     If you have questions or need follow up information about today's clinic visit  "or your schedule please contact [facility] directly at [phone].  Normal or non-critical lab and imaging results will be communicated to you by MyChart, letter or phone within 4 business days after the clinic has received the results. If you do not hear from us within 7 days, please contact the clinic through [employer] or phone. If you have a critical or abnormal lab result, we will notify you by phone as soon as possible.  Submit refill requests through [employer] or call your pharmacy and they will forward the refill request to us. Please allow 3 business days for your refill to be completed.          Additional Information About Your Visit        [employer] Information     [employer] gives you secure access to your electronic health record. If you see a primary care provider, you can also send messages to your care team and make appointments. If you have questions, please call your primary care clinic.  If you do not have a primary care provider, please call [phone] and they will assist you.        Care EveryWhere ID     This is your Care EveryWhere ID. This could be used by other organizations to access your [place] medical records  [id number]        Your Vitals Were     Pulse Temperature Respirations Height Pulse Oximetry BMI (Body Mass Index)    88 97.9  F (36.6  C) (Temporal) 18 1.676 m (5' 6\") 97% 22.14 kg/m2       Blood Pressure from Last 3 Encounters:   11/20/18 128/62   10/25/18 104/66   09/07/18 130/58    Weight from Last 3 Encounters:   11/20/18 62.2 kg (137 lb 3.2 oz)   10/25/18 62.6 kg (138 lb)   09/07/18 64 kg (141 lb 1.6 oz)               Primary Care Provider Office Phone # Fax #    Phani Tapia PA-C [phone] [facility] [address]        Equal Access to Services     SHIVANI ARTIS AH: Amy whitfieldo Sojose, waaxda luqadaha, qaybta kaalmada adeegyajackson, wale pena. So [facility] [phone].    ATENCIÓN: Si habla " español, tiene a ordoñez disposición servicios gratuitos de asistencia lingüística. Shaneka brandt [phone].    We comply with applicable federal civil rights laws and [place] laws. We do not discriminate on the basis of race, color, national origin, age, disability, sex, sexual orientation, or gender identity.            Thank you!     Thank you for choosing [facility]  for your care. Our goal is always to provide you with excellent care. Hearing back from our patients is one way we can continue to improve our services. Please take a few minutes to complete the written survey that you may receive in the mail after your visit with us. Thank you!             Your Updated Medication List - Protect others around you: Learn how to safely use, store and throw away your medicines at www.disposemymeds.org.          This list is accurate as of 11/20/18 10:31 AM.  Always use your most recent med list.                   Brand Name Dispense Instructions for use Diagnosis    ALPRAZolam 0.5 MG tablet    XANAX    30 tablet    Take 1 tablet (0.5 mg) by mouth 3 times daily as needed for anxiety    Anxiety       * atorvastatin 20 MG tablet    LIPITOR    30 tablet    TAKE ONE TABLET BY MOUTH EVERY DAY    Hyperlipidemia LDL goal <130       * atorvastatin 20 MG tablet    LIPITOR    90 tablet    TAKE ONE TABLET BY MOUTH EVERY DAY. NEEDS FASTING LABS    Hyperlipidemia LDL goal <130       fluocinonide 0.05 % cream    LIDEX    30 g    Apply thin layer twice daily to spots on left hand until resolved or up to two weeks.    Prurigo nodularis, Nonvenomous arthropod as cause of injury       FLUoxetine 40 MG capsule    PROzac    90 capsule    Take 1 capsule (40 mg) by mouth daily    Adjustment disorder with depressed mood       gabapentin 300 MG capsule    NEURONTIN    90 capsule    Take 1 capsule (300 mg) by mouth At Bedtime    Lumbar radiculopathy       hydrochlorothiazide 25 MG tablet    HYDRODIURIL    90 tablet    Take 1 tablet  (25 mg) by mouth daily    Essential hypertension       oxybutynin 5 MG tablet    DITROPAN    90 tablet    TAKE ONE-HALF TABLET BY MOUTH TWICE A DAY    Dysuria       oxyCODONE-acetaminophen 5-325 MG per tablet    PERCOCET    15 tablet    Take 1-2 tablets by mouth every 6 hours as needed for moderate to severe pain (#15 to last 30 days)    Chronic pain syndrome, DDD (degenerative disc disease), lumbar       PREVACID PO      Take 40 mg by mouth        prochlorperazine 10 MG tablet    COMPAZINE    30 tablet    TAKE ONE TABLET BY MOUTH EVERY 6 HOURS AS NEEDED (NAUSEA/VOMITING)    Encounter for antineoplastic chemotherapy       TAMSULOSIN HCL PO      Take 1 mg by mouth        TYLENOL EX ST ARTHRITIS PAIN 500 MG tablet   Generic drug:  acetaminophen      Take 500-1,000 mg by mouth every 6 hours as needed for mild pain    Dermatitis due to sun       * Notice:  This list has 2 medication(s) that are the same as other medications prescribed for you. Read the directions carefully, and ask your doctor or other care provider to review them with you.

## 2018-11-20 NOTE — LETTER
11/20/2018         RE: Michaela Dorman  [address]  [facility] [address]        Dear Colleague,    Thank you for referring your patient, Michaela Dorman, to the [facility]. Please see a copy of my visit note below.    Hematology/ Oncology Follow-up Visit:  Nov 20, 2018    Reason for Visit:   Chief Complaint   Patient presents with     Oncology Clinic Visit     6 month follow up- Malignant neoplasm of upper-outer quadrant of left female breast, unspecified estrogen receptor status     Results     11/15/18 labs completd        Oncologic History:    Malignant neoplasm of upper-outer quadrant of left female breast (H)  Michaela Dorman  initially felt a mass in her left axilla. She then underwent mammogram which showed dense breasts with no suspicious lesions in 4/2016. She was then seen by surgery and underwent excisional LN biopsy that showed metastatic high-grade poorly differentiated carcinoma with a tumor size of 2.5 cm. Immunohistochemistry was done for ER/LA receptors that came back both negative. HER-2/boubacar was amplified by FISH. She then underwent MRI of the breast on 9/26/2016 that showed suspicious abnormality with multicentric left sided breast cancer that involved the left lateral breast form the nipple to the chest wall. A PET scan was obtained on 10/5/2016 that showed a hypermetabolic opacity in the left axilla wihtout other pathological activity. It was then recommended that she undergo neoadjuvant chemotherapy with Cytoxan and Doxorubicin that will be followed by Taxol, Herceptin. Patient initiated treatment on 10/11/2016.         Interval History:  It is here today for follow-up.  She has been feeling well without any recent complaints of weight loss or night sweats or fever or chills.  She denies any shortness of breath or cough or wheezing.  She denies any bone aches or pains.    Review Of Systems:  Constitutional: Negative for fever, chills, and night sweats.  Skin:  negative.  Eyes: negative.  Ears/Nose/Throat: negative.  Respiratory: No shortness of breath, dyspnea on exertion, cough, or hemoptysis.  Cardiovascular: negative.  Gastrointestinal: negative.  Genitourinary: negative.  Musculoskeletal: negative.  Neurologic: negative.  Psychiatric: negative.  Hematologic/Lymphatic/Immunologic: negative.  Endocrine: negative.    All other ROS negative unless mentioned in interval history.    Past medical, social, surgical, and family histories reviewed.    Allergies:  Allergies as of 11/20/2018 - Oskar as Reviewed 10/25/2018   Allergen Reaction Noted     No known drug allergies  07/05/2002       Current Medications:  Current Outpatient Prescriptions   Medication Sig Dispense Refill     acetaminophen (TYLENOL EX ST ARTHRITIS PAIN) 500 MG tablet Take 500-1,000 mg by mouth every 6 hours as needed for mild pain       atorvastatin (LIPITOR) 20 MG tablet TAKE ONE TABLET BY MOUTH EVERY DAY. NEEDS FASTING LABS 90 tablet 0     atorvastatin (LIPITOR) 20 MG tablet TAKE ONE TABLET BY MOUTH EVERY DAY 30 tablet 0     FLUoxetine (PROZAC) 40 MG capsule Take 1 capsule (40 mg) by mouth daily 90 capsule 1     hydrochlorothiazide (HYDRODIURIL) 25 MG tablet Take 1 tablet (25 mg) by mouth daily 90 tablet 1     Lansoprazole (PREVACID PO) Take 40 mg by mouth       oxybutynin (DITROPAN) 5 MG tablet TAKE ONE-HALF TABLET BY MOUTH TWICE A DAY 90 tablet 1     prochlorperazine (COMPAZINE) 10 MG tablet TAKE ONE TABLET BY MOUTH EVERY 6 HOURS AS NEEDED (NAUSEA/VOMITING) 30 tablet 3     TAMSULOSIN HCL PO Take 1 mg by mouth       ALPRAZolam (XANAX) 0.5 MG tablet Take 1 tablet (0.5 mg) by mouth 3 times daily as needed for anxiety (Patient not taking: Reported on 10/25/2018) 30 tablet 0     fluocinonide (LIDEX) 0.05 % cream Apply thin layer twice daily to spots on left hand until resolved or up to two weeks. (Patient not taking: Reported on 10/25/2018) 30 g 0     gabapentin (NEURONTIN) 300 MG capsule Take 1 capsule (300  "mg) by mouth At Bedtime (Patient not taking: Reported on 10/25/2018) 90 capsule 1     oxyCODONE-acetaminophen (PERCOCET) 5-325 MG per tablet Take 1-2 tablets by mouth every 6 hours as needed for moderate to severe pain (#15 to last 30 days) (Patient not taking: Reported on 11/20/2018) 15 tablet 0        Physical Exam:  /62 (BP Location: Right arm, Patient Position: Chair, Cuff Size: Adult Regular)  Pulse 88  Temp 97.9  F (36.6  C) (Temporal)  Resp 18  Ht 1.676 m (5' 6\")  Wt 62.2 kg (137 lb 3.2 oz)  SpO2 97%  BMI 22.14 kg/m2  Wt Readings from Last 12 Encounters:   11/20/18 62.2 kg (137 lb 3.2 oz)   10/25/18 62.6 kg (138 lb)   09/07/18 64 kg (141 lb 1.6 oz)   06/05/18 65.1 kg (143 lb 9.6 oz)   05/15/18 63.5 kg (140 lb)   02/26/18 63 kg (139 lb)   02/16/18 64.5 kg (142 lb 3.2 oz)   02/14/18 63.6 kg (140 lb 4.8 oz)   02/06/18 63.6 kg (140 lb 4.8 oz)   02/02/18 62.4 kg (137 lb 8 oz)   01/16/18 63.6 kg (140 lb 4.8 oz)   01/04/18 64.5 kg (142 lb 4.8 oz)     GENERAL APPEARANCE: Healthy, alert and in no acute distress.  HEENT: Sclerae anicteric. PERRLA. Oropharynx without ulcers, lesions, or thrush.  NECK: Supple. No asymmetry or masses.  LYMPHATICS: No palpable cervical, supraclavicular, axillary, or inguinal lymphadenopathy.  RESP: Lungs clear to auscultation bilaterally without rales, rhonchi or wheezes.\", BREAST: Right-scar of mastectomy without any lumps or adenopathy. Left-scar of lumpectomy without any chest wall lesions or adenopathy.  \"CARDIOVASCULAR: Regular rate and rhythm. Normal S1, S2; no S3 or S4. No murmur, gallop, or rub.  ABDOMEN: Soft, nontender. Bowel sounds normal. No palpable organomegaly or masses.  MUSCULOSKELETAL: Extremities without gross deformities noted. No edema of bilateral lower extremities.  SKIN: No suspicious lesions or rashes.  NEURO: Alert and oriented x 3. Cranial nerves II-XII grossly intact.  PSYCHIATRIC: Mentation and affect appear normal.    Laboratory/Imaging " Studies:  Orders Only on 11/15/2018   Component Date Value Ref Range Status     WBC 11/15/2018 6.7  4.0 - 11.0 10e9/L Final     RBC Count 11/15/2018 3.92  3.8 - 5.2 10e12/L Final     Hemoglobin 11/15/2018 12.8  11.7 - 15.7 g/dL Final     Hematocrit 11/15/2018 39.1  35.0 - 47.0 % Final     MCV 11/15/2018 100  78 - 100 fl Final     MCH 11/15/2018 32.7  26.5 - 33.0 pg Final     MCHC 11/15/2018 32.7  31.5 - 36.5 g/dL Final     RDW 11/15/2018 13.8  10.0 - 15.0 % Final     Platelet Count 11/15/2018 268  150 - 450 10e9/L Final     Diff Method 11/15/2018 Automated Method   Final     % Neutrophils 11/15/2018 64.9  % Final     % Lymphocytes 11/15/2018 24.0  % Final     % Monocytes 11/15/2018 7.4  % Final     % Eosinophils 11/15/2018 2.7  % Final     % Basophils 11/15/2018 0.6  % Final     % Immature Granulocytes 11/15/2018 0.4  % Final     Nucleated RBCs 11/15/2018 0  0 /100 Final     Absolute Neutrophil 11/15/2018 4.4  1.6 - 8.3 10e9/L Final     Absolute Lymphocytes 11/15/2018 1.6  0.8 - 5.3 10e9/L Final     Absolute Monocytes 11/15/2018 0.5  0.0 - 1.3 10e9/L Final     Absolute Basophils 11/15/2018 0.0  0.0 - 0.2 10e9/L Final     Abs Immature Granulocytes 11/15/2018 0.0  0 - 0.4 10e9/L Final     Absolute Nucleated RBC 11/15/2018 0.0   Final     Sodium 11/15/2018 141  133 - 144 mmol/L Final     Potassium 11/15/2018 3.8  3.4 - 5.3 mmol/L Final     Chloride 11/15/2018 101  94 - 109 mmol/L Final     Carbon Dioxide 11/15/2018 29  20 - 32 mmol/L Final     Anion Gap 11/15/2018 11  3 - 14 mmol/L Final     Glucose 11/15/2018 81  70 - 99 mg/dL Final     Urea Nitrogen 11/15/2018 27  7 - 30 mg/dL Final     Creatinine 11/15/2018 1.17* 0.52 - 1.04 mg/dL Final     GFR Estimate 11/15/2018 46* >60 mL/min/1.7m2 Final    Non  GFR Calc     GFR Estimate If Black 11/15/2018 55* >60 mL/min/1.7m2 Final    African American GFR Calc     Calcium 11/15/2018 9.5  8.5 - 10.1 mg/dL Final     Bilirubin Total 11/15/2018 0.4  0.2 - 1.3 mg/dL  Final     Albumin 11/15/2018 4.0  3.4 - 5.0 g/dL Final     Protein Total 11/15/2018 7.5  6.8 - 8.8 g/dL Final     Alkaline Phosphatase 11/15/2018 48  40 - 150 U/L Final     ALT 11/15/2018 42  0 - 50 U/L Final     AST 11/15/2018 31  0 - 45 U/L Final     CA 27-29 11/15/2018 18  0 - 39 U/mL Final    Assay Method:  Chemiluminescence using Siemens Centaur XP          Assessment and plan:    (C50.412) Malignant neoplasm of upper-outer quadrant of left female breast, unspecified estrogen receptor status (H)  (primary encounter diagnosis)  Reviewed with the patient today most recent laboratory tests.  There is no clinical evidence of breast cancer recurrence.  I will see the patient again in 6 months or sooner if there are new developments or concerns.    (F43.21) Adjustment disorder with depressed mood  The patient currently on Prozac 40 mg orally daily.    (I10) Essential hypertension  The patient currently on hydrochlorothiazide 25 mg orally daily.    (E78.5) Hyperlipidemia LDL goal <130  Patient currently on Lipitor 20 mg orally daily.    The patient is ready to learn, no apparent learning barriers were identified.  Diagnosis and treatment plans were explained to the patient. The patient expressed understanding of the content. The patient asked appropriate questions. The patient questions were answered to her satisfaction.    Chart documentation with Dragon Voice recognition Software. Although reviewed after completion, some words and grammatical errors may remain.    Again, thank you for allowing me to participate in the care of your patient.        Sincerely,        Syeda Ferguson MD

## 2018-11-20 NOTE — PROGRESS NOTES
Hematology/ Oncology Follow-up Visit:  Nov 20, 2018    Reason for Visit:   Chief Complaint   Patient presents with     Oncology Clinic Visit     6 month follow up- Malignant neoplasm of upper-outer quadrant of left female breast, unspecified estrogen receptor status     Results     11/15/18 labs completd        Oncologic History:    Malignant neoplasm of upper-outer quadrant of left female breast (H)  Michaela Dorman  initially felt a mass in her left axilla. She then underwent mammogram which showed dense breasts with no suspicious lesions in 4/2016. She was then seen by surgery and underwent excisional LN biopsy that showed metastatic high-grade poorly differentiated carcinoma with a tumor size of 2.5 cm. Immunohistochemistry was done for ER/TX receptors that came back both negative. HER-2/boubacar was amplified by FISH. She then underwent MRI of the breast on 9/26/2016 that showed suspicious abnormality with multicentric left sided breast cancer that involved the left lateral breast form the nipple to the chest wall. A PET scan was obtained on 10/5/2016 that showed a hypermetabolic opacity in the left axilla wihtout other pathological activity. It was then recommended that she undergo neoadjuvant chemotherapy with Cytoxan and Doxorubicin that will be followed by Taxol, Herceptin. Patient initiated treatment on 10/11/2016.         Interval History:  It is here today for follow-up.  She has been feeling well without any recent complaints of weight loss or night sweats or fever or chills.  She denies any shortness of breath or cough or wheezing.  She denies any bone aches or pains.    Review Of Systems:  Constitutional: Negative for fever, chills, and night sweats.  Skin: negative.  Eyes: negative.  Ears/Nose/Throat: negative.  Respiratory: No shortness of breath, dyspnea on exertion, cough, or hemoptysis.  Cardiovascular: negative.  Gastrointestinal: negative.  Genitourinary: negative.  Musculoskeletal:  negative.  Neurologic: negative.  Psychiatric: negative.  Hematologic/Lymphatic/Immunologic: negative.  Endocrine: negative.    All other ROS negative unless mentioned in interval history.    Past medical, social, surgical, and family histories reviewed.    Allergies:  Allergies as of 11/20/2018 - Oskar as Reviewed 10/25/2018   Allergen Reaction Noted     No known drug allergies  07/05/2002       Current Medications:  Current Outpatient Prescriptions   Medication Sig Dispense Refill     acetaminophen (TYLENOL EX ST ARTHRITIS PAIN) 500 MG tablet Take 500-1,000 mg by mouth every 6 hours as needed for mild pain       atorvastatin (LIPITOR) 20 MG tablet TAKE ONE TABLET BY MOUTH EVERY DAY. NEEDS FASTING LABS 90 tablet 0     atorvastatin (LIPITOR) 20 MG tablet TAKE ONE TABLET BY MOUTH EVERY DAY 30 tablet 0     FLUoxetine (PROZAC) 40 MG capsule Take 1 capsule (40 mg) by mouth daily 90 capsule 1     hydrochlorothiazide (HYDRODIURIL) 25 MG tablet Take 1 tablet (25 mg) by mouth daily 90 tablet 1     Lansoprazole (PREVACID PO) Take 40 mg by mouth       oxybutynin (DITROPAN) 5 MG tablet TAKE ONE-HALF TABLET BY MOUTH TWICE A DAY 90 tablet 1     prochlorperazine (COMPAZINE) 10 MG tablet TAKE ONE TABLET BY MOUTH EVERY 6 HOURS AS NEEDED (NAUSEA/VOMITING) 30 tablet 3     TAMSULOSIN HCL PO Take 1 mg by mouth       ALPRAZolam (XANAX) 0.5 MG tablet Take 1 tablet (0.5 mg) by mouth 3 times daily as needed for anxiety (Patient not taking: Reported on 10/25/2018) 30 tablet 0     fluocinonide (LIDEX) 0.05 % cream Apply thin layer twice daily to spots on left hand until resolved or up to two weeks. (Patient not taking: Reported on 10/25/2018) 30 g 0     gabapentin (NEURONTIN) 300 MG capsule Take 1 capsule (300 mg) by mouth At Bedtime (Patient not taking: Reported on 10/25/2018) 90 capsule 1     oxyCODONE-acetaminophen (PERCOCET) 5-325 MG per tablet Take 1-2 tablets by mouth every 6 hours as needed for moderate to severe pain (#15 to last 30  "days) (Patient not taking: Reported on 11/20/2018) 15 tablet 0        Physical Exam:  /62 (BP Location: Right arm, Patient Position: Chair, Cuff Size: Adult Regular)  Pulse 88  Temp 97.9  F (36.6  C) (Temporal)  Resp 18  Ht 1.676 m (5' 6\")  Wt 62.2 kg (137 lb 3.2 oz)  SpO2 97%  BMI 22.14 kg/m2  Wt Readings from Last 12 Encounters:   11/20/18 62.2 kg (137 lb 3.2 oz)   10/25/18 62.6 kg (138 lb)   09/07/18 64 kg (141 lb 1.6 oz)   06/05/18 65.1 kg (143 lb 9.6 oz)   05/15/18 63.5 kg (140 lb)   02/26/18 63 kg (139 lb)   02/16/18 64.5 kg (142 lb 3.2 oz)   02/14/18 63.6 kg (140 lb 4.8 oz)   02/06/18 63.6 kg (140 lb 4.8 oz)   02/02/18 62.4 kg (137 lb 8 oz)   01/16/18 63.6 kg (140 lb 4.8 oz)   01/04/18 64.5 kg (142 lb 4.8 oz)     GENERAL APPEARANCE: Healthy, alert and in no acute distress.  HEENT: Sclerae anicteric. PERRLA. Oropharynx without ulcers, lesions, or thrush.  NECK: Supple. No asymmetry or masses.  LYMPHATICS: No palpable cervical, supraclavicular, axillary, or inguinal lymphadenopathy.  RESP: Lungs clear to auscultation bilaterally without rales, rhonchi or wheezes.\", BREAST: Right-scar of mastectomy without any lumps or adenopathy. Left-scar of lumpectomy without any chest wall lesions or adenopathy.  \"CARDIOVASCULAR: Regular rate and rhythm. Normal S1, S2; no S3 or S4. No murmur, gallop, or rub.  ABDOMEN: Soft, nontender. Bowel sounds normal. No palpable organomegaly or masses.  MUSCULOSKELETAL: Extremities without gross deformities noted. No edema of bilateral lower extremities.  SKIN: No suspicious lesions or rashes.  NEURO: Alert and oriented x 3. Cranial nerves II-XII grossly intact.  PSYCHIATRIC: Mentation and affect appear normal.    Laboratory/Imaging Studies:  Orders Only on 11/15/2018   Component Date Value Ref Range Status     WBC 11/15/2018 6.7  4.0 - 11.0 10e9/L Final     RBC Count 11/15/2018 3.92  3.8 - 5.2 10e12/L Final     Hemoglobin 11/15/2018 12.8  11.7 - 15.7 g/dL Final     " Hematocrit 11/15/2018 39.1  35.0 - 47.0 % Final     MCV 11/15/2018 100  78 - 100 fl Final     MCH 11/15/2018 32.7  26.5 - 33.0 pg Final     MCHC 11/15/2018 32.7  31.5 - 36.5 g/dL Final     RDW 11/15/2018 13.8  10.0 - 15.0 % Final     Platelet Count 11/15/2018 268  150 - 450 10e9/L Final     Diff Method 11/15/2018 Automated Method   Final     % Neutrophils 11/15/2018 64.9  % Final     % Lymphocytes 11/15/2018 24.0  % Final     % Monocytes 11/15/2018 7.4  % Final     % Eosinophils 11/15/2018 2.7  % Final     % Basophils 11/15/2018 0.6  % Final     % Immature Granulocytes 11/15/2018 0.4  % Final     Nucleated RBCs 11/15/2018 0  0 /100 Final     Absolute Neutrophil 11/15/2018 4.4  1.6 - 8.3 10e9/L Final     Absolute Lymphocytes 11/15/2018 1.6  0.8 - 5.3 10e9/L Final     Absolute Monocytes 11/15/2018 0.5  0.0 - 1.3 10e9/L Final     Absolute Basophils 11/15/2018 0.0  0.0 - 0.2 10e9/L Final     Abs Immature Granulocytes 11/15/2018 0.0  0 - 0.4 10e9/L Final     Absolute Nucleated RBC 11/15/2018 0.0   Final     Sodium 11/15/2018 141  133 - 144 mmol/L Final     Potassium 11/15/2018 3.8  3.4 - 5.3 mmol/L Final     Chloride 11/15/2018 101  94 - 109 mmol/L Final     Carbon Dioxide 11/15/2018 29  20 - 32 mmol/L Final     Anion Gap 11/15/2018 11  3 - 14 mmol/L Final     Glucose 11/15/2018 81  70 - 99 mg/dL Final     Urea Nitrogen 11/15/2018 27  7 - 30 mg/dL Final     Creatinine 11/15/2018 1.17* 0.52 - 1.04 mg/dL Final     GFR Estimate 11/15/2018 46* >60 mL/min/1.7m2 Final    Non  GFR Calc     GFR Estimate If Black 11/15/2018 55* >60 mL/min/1.7m2 Final    African American GFR Calc     Calcium 11/15/2018 9.5  8.5 - 10.1 mg/dL Final     Bilirubin Total 11/15/2018 0.4  0.2 - 1.3 mg/dL Final     Albumin 11/15/2018 4.0  3.4 - 5.0 g/dL Final     Protein Total 11/15/2018 7.5  6.8 - 8.8 g/dL Final     Alkaline Phosphatase 11/15/2018 48  40 - 150 U/L Final     ALT 11/15/2018 42  0 - 50 U/L Final     AST 11/15/2018 31  0 - 45  U/L Final     CA 27-29 11/15/2018 18  0 - 39 U/mL Final    Assay Method:  Chemiluminescence using Siemens Centaur XP          Assessment and plan:    (C50.412) Malignant neoplasm of upper-outer quadrant of left female breast, unspecified estrogen receptor status (H)  (primary encounter diagnosis)  Reviewed with the patient today most recent laboratory tests.  There is no clinical evidence of breast cancer recurrence.  I will see the patient again in 6 months or sooner if there are new developments or concerns.    (F43.21) Adjustment disorder with depressed mood  The patient currently on Prozac 40 mg orally daily.    (I10) Essential hypertension  The patient currently on hydrochlorothiazide 25 mg orally daily.    (E78.5) Hyperlipidemia LDL goal <130  Patient currently on Lipitor 20 mg orally daily.    The patient is ready to learn, no apparent learning barriers were identified.  Diagnosis and treatment plans were explained to the patient. The patient expressed understanding of the content. The patient asked appropriate questions. The patient questions were answered to her satisfaction.    Chart documentation with Dragon Voice recognition Software. Although reviewed after completion, some words and grammatical errors may remain.

## 2018-11-21 ENCOUNTER — ALLIED HEALTH/NURSE VISIT (OUTPATIENT)
Dept: NURSING | Facility: CLINIC | Age: 68
End: 2018-11-21
Payer: COMMERCIAL

## 2018-11-21 DIAGNOSIS — L57.0 AK (ACTINIC KERATOSIS): ICD-10-CM

## 2018-11-21 PROCEDURE — 96567 PDT DSTR PRMLG LES SKN: CPT

## 2018-11-21 ASSESSMENT — PAIN SCALES - GENERAL: PAINLEVEL: NO PAIN (0)

## 2018-11-21 NOTE — MR AVS SNAPSHOT
After Visit Summary   11/21/2018    Michaela Dorman    MRN: [id number]           Patient Information     Date Of Birth          1950        Visit Information        Provider Department      11/21/2018 1:30 PM NURSE ONLY [facility] DER[facility]        Today's Diagnoses     AK (actinic keratosis)          Care Instructions    Photodynamic Therapy (PDT) Home Care Instructions  I will experience redness, swelling, pain and discomfort which will last 5-10 days. I may experience pinkness or redness that persists for 2-3 weeks but longer duration is possible in some individuals. Risks are infection, eye injury, blistering, reactivation of the cold sore virus, skin lightening, skin darkening or scarring. Multiple treatments may be required.   DAY OF TREATMENT  1) Wash treated area with mild cleanser.  Redness of the treated skin is expected and can vary significantly from person to person and treatment to treatment.  2) Apply SPF 50 before leaving your home/office and while driving to and from work.  3) Remain indoors if possible and avoid direct as well as indirect sunlight.  If your head or face were treated, wear a broad brimmed hat wile going to and from your car.  If your hands/arms were treated, were long sleeves and gloves.  4) Ice packs every 1-2 hours may be helpful as well.  5) In the evening, cleanse treated area gently.  Your skin will feel dry; keep treated area moisturized with a moisturizer.  6) If you have a history of cold sores, take (1) Valtrex 500mg tablet before bedtime, which will be prescribed by your physician.  DAY TWO  1) If you have a history of cold sores, take (1) Valtrex 500 mg tablets in the morning and in the evening on days 2 and 3.  2) You may take a shower.  Men should NOT shave if their face was treated.  3) Cleanse treated area gently.  4) Apply SPF 50  5) Most discomfort usually subsides by Day 3.  6) You should avoid direct sunlight and try to remain  indoors on Day 2.  The sensitivity to sunlight will significantly decrease after 48 hours.  7) You should soak the treated areas with as soft washcloth with cold water for 20 minutes every 4-6 hours.  Ice may be applied after the cold-water soaks, if this feels good.  The area should be patted dry and moisturized following the cold-water soaks.  8) Follow evening routine as you did on Day 1    DAYS 3- 7   1) Try to avoid direct sunlight and minimize incidental exposure for one week.  NO BEACHES!  Continue using SPF 50.  This is very important in protecting your newly rejuvenated skin.  2) You may begin applying make-up once any crusting has healed.  The area may be slightly red for a few weeks.  3) The skin will feel dry and tightened.  Moisturize once to twice daily.  Please plan to be at your appointment for approximately 90 minutes.  Your treatments are scheduled as follows.  Who should I call with questions?    [facility]: [phone]     [facility]: [phone]    For urgent needs outside of business hours call the [facility] at [phone] and ask for the dermatology resident on call                  Follow-ups after your visit        Your next 10 appointments already scheduled     Dec 11, 2018  1:00 PM CST   Return Visit with Yecenia Castle MD   [facility])    [address] [place] [id number]   [phone]            Dec 28, 2018 10:00 AM CST   Nurse Only with NURSE ONLY MG DERM   [facility])    [address] [place] [id number]   [phone]            May 10, 2019  9:00 AM CDT   LAB with NL LAB [facility]    [address] [facility] [address] [phone]           Please do not eat 10-12 hours before your appointment if you are coming in fasting  for labs on lipids, cholesterol, or glucose (sugar). This does not apply to pregnant women. Water, hot tea and black coffee (with nothing added) are okay. Do not drink other fluids, diet soda or chew gum.            May 14, 2019  9:45 AM CDT   [facility]    [address] [place] [id number] [phone]              Future tests that were ordered for you today     Open Standing Orders        Priority Remaining Interval Expires Ordered    C AMINOLEVULINIC ACID HCL TOP, 354 MG Routine 1/1 11/22/2018 11/21/2018          Open Future Orders        Priority Expected Expires Ordered    CBC with platelets differential Routine 5/20/2019 11/20/2019 11/20/2018    Comprehensive metabolic panel Routine 5/20/2019 11/20/2019 11/20/2018    Ca27.29  breast tumor marker Routine 5/20/2019 11/20/2019 11/20/2018            Who to contact     If you have questions or need follow up information about today's clinic visit or your schedule please contact [facility] directly at [phone].  Normal or non-critical lab and imaging results will be communicated to you by [employer], letter or phone within 4 business days after the clinic has received the results. If you do not hear from us within 7 days, please contact the clinic through [employer] or phone. If you have a critical or abnormal lab result, we will notify you by phone as soon as possible.  Submit refill requests through [employer] or call your pharmacy and they will forward the refill request to us. Please allow 3 business days for your refill to be completed.          Additional Information About Your Visit        [employer] Information     [employer] gives you secure access to your electronic health record. If you see a primary care provider, you can also send messages to your care team and make appointments. If you have questions, please call your primary care clinic.  If  you do not have a primary care provider, please call [phone] and they will assist you.      [employer] is an electronic gateway that provides easy, online access to your medical records. With [employer], you can request a clinic appointment, read your test results, renew a prescription or communicate with your care team.     To access your existing account, please contact your [facility] or call [phone] for assistance.        Care EveryWhere ID     This is your Care EveryWhere ID. This could be used by other organizations to access your [place] medical records  [id number]         Blood Pressure from Last 3 Encounters:   11/20/18 128/62   10/25/18 104/66   09/07/18 130/58    Weight from Last 3 Encounters:   11/20/18 62.2 kg (137 lb 3.2 oz)   10/25/18 62.6 kg (138 lb)   09/07/18 64 kg (141 lb 1.6 oz)              We Performed the Following     PHOTODYNAMIC THERAPY - (PDT)        Primary Care Provider Office Phone # Fax #    Phani Tapia PA-C [phone] [email or web address] [facility] [address]        Equal Access to Services     DAYAMI ARTIS AH: Hadii aad ku hadasho Soomaali, waaxda luqadaha, qaybta kaalmada adeegyada, waxay idiin hayaan al hawk lazhanna . So [facility] [phone].    ATENCIÓN: Si habla español, tiene a ordoñez disposición servicios gratuitos de asistencia lingüística. Llame al [phone].    We comply with applicable federal civil rights laws and [place] laws. We do not discriminate on the basis of race, color, national origin, age, disability, sex, sexual orientation, or gender identity.            Thank you!     Thank you for choosing [facility]  for your care. Our goal is always to provide you with excellent care. Hearing back from our patients is one way we can continue to improve our services. Please take a few minutes to complete the written survey that you may receive in the mail after your visit with us. Thank you!              Your Updated Medication List - Protect others around you: Learn how to safely use, store and throw away your medicines at www.disposemymeds.org.          This list is accurate as of 11/21/18  3:16 PM.  Always use your most recent med list.                   Brand Name Dispense Instructions for use Diagnosis    ALPRAZolam 0.5 MG tablet    XANAX    30 tablet    Take 1 tablet (0.5 mg) by mouth 3 times daily as needed for anxiety    Anxiety       * atorvastatin 20 MG tablet    LIPITOR    30 tablet    TAKE ONE TABLET BY MOUTH EVERY DAY    Hyperlipidemia LDL goal <130       * atorvastatin 20 MG tablet    LIPITOR    90 tablet    TAKE ONE TABLET BY MOUTH EVERY DAY. NEEDS FASTING LABS    Hyperlipidemia LDL goal <130       fluocinonide 0.05 % cream    LIDEX    30 g    Apply thin layer twice daily to spots on left hand until resolved or up to two weeks.    Prurigo nodularis, Nonvenomous arthropod as cause of injury       FLUoxetine 40 MG capsule    PROzac    90 capsule    Take 1 capsule (40 mg) by mouth daily    Adjustment disorder with depressed mood       gabapentin 300 MG capsule    NEURONTIN    90 capsule    Take 1 capsule (300 mg) by mouth At Bedtime    Lumbar radiculopathy       hydrochlorothiazide 25 MG tablet    HYDRODIURIL    90 tablet    Take 1 tablet (25 mg) by mouth daily    Essential hypertension       oxybutynin 5 MG tablet    DITROPAN    90 tablet    TAKE ONE-HALF TABLET BY MOUTH TWICE A DAY    Dysuria       oxyCODONE-acetaminophen 5-325 MG tablet    PERCOCET    15 tablet    Take 1-2 tablets by mouth every 6 hours as needed for moderate to severe pain (#15 to last 30 days)    Chronic pain syndrome, DDD (degenerative disc disease), lumbar       PREVACID PO      Take 40 mg by mouth        prochlorperazine 10 MG tablet    COMPAZINE    30 tablet    TAKE ONE TABLET BY MOUTH EVERY 6 HOURS AS NEEDED (NAUSEA/VOMITING)    Encounter for antineoplastic chemotherapy       TAMSULOSIN HCL PO      Take 1 mg by mouth         TYLENOL EX ST ARTHRITIS PAIN 500 MG tablet   Generic drug:  acetaminophen      Take 500-1,000 mg by mouth every 6 hours as needed for mild pain    Dermatitis due to sun       * Notice:  This list has 2 medication(s) that are the same as other medications prescribed for you. Read the directions carefully, and ask your doctor or other care provider to review them with you.

## 2018-11-21 NOTE — PATIENT INSTRUCTIONS
Photodynamic Therapy (PDT) Home Care Instructions  I will experience redness, swelling, pain and discomfort which will last 5-10 days. I may experience pinkness or redness that persists for 2-3 weeks but longer duration is possible in some individuals. Risks are infection, eye injury, blistering, reactivation of the cold sore virus, skin lightening, skin darkening or scarring. Multiple treatments may be required.   DAY OF TREATMENT  1) Wash treated area with mild cleanser.  Redness of the treated skin is expected and can vary significantly from person to person and treatment to treatment.  2) Apply SPF 50 before leaving your home/office and while driving to and from work.  3) Remain indoors if possible and avoid direct as well as indirect sunlight.  If your head or face were treated, wear a broad brimmed hat wile going to and from your car.  If your hands/arms were treated, were long sleeves and gloves.  4) Ice packs every 1-2 hours may be helpful as well.  5) In the evening, cleanse treated area gently.  Your skin will feel dry; keep treated area moisturized with a moisturizer.  6) If you have a history of cold sores, take (1) Valtrex 500mg tablet before bedtime, which will be prescribed by your physician.  DAY TWO  1) If you have a history of cold sores, take (1) Valtrex 500 mg tablets in the morning and in the evening on days 2 and 3.  2) You may take a shower.  Men should NOT shave if their face was treated.  3) Cleanse treated area gently.  4) Apply SPF 50  5) Most discomfort usually subsides by Day 3.  6) You should avoid direct sunlight and try to remain indoors on Day 2.  The sensitivity to sunlight will significantly decrease after 48 hours.  7) You should soak the treated areas with as soft washcloth with cold water for 20 minutes every 4-6 hours.  Ice may be applied after the cold-water soaks, if this feels good.  The area should be patted dry and moisturized following the cold-water soaks.  8) Follow  evening routine as you did on Day 1    DAYS 3- 7   1) Try to avoid direct sunlight and minimize incidental exposure for one week.  NO BEACHES!  Continue using SPF 50.  This is very important in protecting your newly rejuvenated skin.  2) You may begin applying make-up once any crusting has healed.  The area may be slightly red for a few weeks.  3) The skin will feel dry and tightened.  Moisturize once to twice daily.  Please plan to be at your appointment for approximately 90 minutes.  Your treatments are scheduled as follows.  Who should I call with questions?    [facility]: [phone]     [facility]: [phone]    For urgent needs outside of business hours call the [facility] at [phone] and ask for the dermatology resident on call

## 2018-11-21 NOTE — NURSING NOTE
Michaela Dorman comes into clinic today at the request of  Ordering Provider for PDT.    Photodynamic Therapy Intake:    1.Close monitoring for more significant reaction, and avoid saran wrap if YES to any of the following:  New medications since seen by physician? No (If yes, contact supervising physician)  NSAIDs, ibuprofen, aspirin, naproxen, piroxicam: No  Antiarrhythmics (amiodarone, quinidine): No  Hydrochlorothiazide:  Yes    2.Will hold PDT for YES to any of the following:  Pregnancy/breastfeeding/unknown pregnancy: No  Sun burn: No  Tetracyclines such as doxycycline: No  Ciprofloxacin: No   Methotrexate: No    3.Will hold LEVULAN for YES to any of the following:   Treatment today is for acne:No     The sites to be treated were verified and discussed with patient, sites verified were face .   Expected symptoms and/or complications of redness, peeling, stinging, and burning were reviewed.  Comfort measures including moisturizer, cool compresses, and sun protection measures were also reviewed with patient.  After review, patient verbalized understanding of procedure and consent form signed by patient(as per MD documentation) and patient agrees to proceed with treatment.     Applied 1 Levulan stick to the site/s.  No saran wrap applied to face     MEDICATION: Levulan  DOSE: 1.5 mL  ROUTE: Topical  : [employer]  LOCATION GIVEN: face   LOT NO: [id number]  EXP. DATE: 09/20  NDC: 39524-628-96    TREATMENT NUMBER(30 maximum treatments per site): 1      Photodynamic Therapy(PDT) Post Op Note    Patient successfully completed PDT treatment today. Patient tolerated treatment without complication.  Sites were cleansed with mild soap and water and SPF was applied after treatment.     Sun protection was reviewed with patient:  Patient brought personal hat    After care instructions were reviewed with patient. AVS printed with clinic contact information and given to patient. Patient  verbalized understanding and had no further questions or concerns at this time. Patient left clinic in good condition.      This service provided today was under the supervising provider of the day  , who was available if needed.    Susanne Pina RN

## 2018-12-04 ENCOUNTER — TRANSFERRED RECORDS (OUTPATIENT)
Dept: HEALTH INFORMATION MANAGEMENT | Facility: CLINIC | Age: 68
End: 2018-12-04

## 2018-12-06 ENCOUNTER — TELEPHONE (OUTPATIENT)
Dept: NEUROSURGERY | Facility: CLINIC | Age: 68
End: 2018-12-06

## 2018-12-06 NOTE — TELEPHONE ENCOUNTER
Reason for Call:  Other call back    Detailed comments: Patient asked to be called with EMG results.  Thanks    Phone Number Patient can be reached at: Cell number on file:    Telephone Information:   Mobile [phone]       Best Time: any    Can we leave a detailed message on this number? YES    Call taken on 12/6/2018 at 9:22 AM by Mercedes Thomason

## 2018-12-06 NOTE — TELEPHONE ENCOUNTER
Called and informed patient of EMG results per Dr. Gomes..     EMG shows inflammation of both L5 and S1 nerves    I explained that because of this Dr. Gomes is not recommending the MIS decompression and that surgery would still be a significant scoliosis correction as discussed. Patient doesn't wish to proceed with that option at this time. She will return to clinic to discuss options again if she decides to proceed in the future.

## 2018-12-11 ENCOUNTER — OFFICE VISIT (OUTPATIENT)
Dept: DERMATOLOGY | Facility: CLINIC | Age: 68
End: 2018-12-11
Payer: COMMERCIAL

## 2018-12-11 DIAGNOSIS — L57.0 AK (ACTINIC KERATOSIS): Primary | ICD-10-CM

## 2018-12-11 DIAGNOSIS — L90.5 SCAR: ICD-10-CM

## 2018-12-11 PROCEDURE — 99213 OFFICE O/P EST LOW 20 MIN: CPT | Performed by: DERMATOLOGY

## 2018-12-11 ASSESSMENT — PAIN SCALES - GENERAL: PAINLEVEL: NO PAIN (0)

## 2018-12-11 NOTE — LETTER
12/11/2018         RE: Michaela Dorman  [address]  [facility] [address]        Dear Colleague,    Thank you for referring your patient, Michaela Dorman, to the [facility]. Please see a copy of my visit note below.    [facility] Dermatology Note      Dermatology Problem List:  1.BCC, left cheek  -s/p Mohs 8/18/2016  2. Actinic keratosis bordering on SCCIS, upper chest (left chest)  -s/p excision 8/29/2011  3. Actinic keratosis  -s/p cryotherapy  -s/p PDT, 11/21/2018  4. History of lost biopsy specimen from R chest on 6/15/17. Repeat biopsy 8/2/17 showed only scar.  5. Suspected bug bites leading to prurigo nodules on left dorsal hand  -Treat with lidex 0.5% cream BID x 2 weeks, if fails to resolve consider biopsy to rule out SCC/HAK  6. Scar, right posterior shoulder, s/p bx 9/11/18 to rule out superficial BCC  7. Relevant medical history:  Breast cancer s/p surgery, chemo, radiation. Now in remission.    Encounter Date: Dec 11, 2018    CC:  Chief Complaint   Patient presents with     Follow Up     3 month follow up, recheck right shoulder and disucuss PDT     History of Present Illness:  Ms. Michaela Dorman is a 68 year old female who presents as a follow up after PDT. She was last seen 9/11/2018 by Dr. Castle. At last visit, we had discussed treatment efudex, but her pharmacy was not able to get it (pharmacy [facility] in [place]), so she decided to do PDT instead. She then treated with PDT treatment on 11/21/2018. She reports it was very painful, but she does note improvement in the scaly spots on her face. She was really red for a week after, but pain was only severe during treatment. She is scheduled for a second appt on 12/26/18. She notes biopsy area healed well on the right shoulder. She has not had any problems with it since biopsy. No other concerns addressed today.    Past Medical History:   Patient Active Problem List   Diagnosis     Enthesopathy of  hip region     Family history of stroke (cerebrovascular)     Trochanteric bursitis     Advanced directives, counseling/discussion     Health Care Home     Hypertension goal BP (blood pressure) < 140/90     Baker's cyst of knee     Patella-femoral syndrome     Adjustment disorder with depressed mood     Essential hypertension     Malignant neoplasm of upper-outer quadrant of left female breast (H)     Encounter for antineoplastic chemotherapy     S/P bilateral mastectomy     Anxiety     Hyperlipidemia LDL goal <130     S/P reverse total shoulder arthroplasty, right     Prophylactic antibiotic for dental procedure indicated due to prior joint replacement     Chronic pain syndrome     Lumbar radiculopathy     Foraminal stenosis of lumbar region     Central stenosis of spinal canal     DDD (degenerative disc disease), lumbar     Past Medical History:   Diagnosis Date     Central stenosis of spinal canal 12/1/2017    lumbar      DDD (degenerative disc disease), lumbar 12/1/2017     Depressive disorder, not elsewhere classified      Esophageal reflux      ETOH abuse     in remission     Foraminal stenosis of lumbar region 12/1/2017    Complete lumbar spine left at various degrees and to a lesser extent the right as well.     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     Subdural hematoma (H)      Past Surgical History:   Procedure Laterality Date     ARTHROPLASTY SHOULDER Right 11/17/2015     ARTHROSCOPY KNEE  6/7/2012    Procedure:ARTHROSCOPY KNEE; right knee arthroscopy with partial lateral menisectomy; Surgeon:DAMIÁN MCALLISTER; Location:[facility]     C LIGATE FALLOPIAN TUBE       C NONSPECIFIC PROCEDURE  1956    ankle fracture surgery     COLONOSCOPY N/A 12/22/2017    Procedure: COLONOSCOPY;  colonoscopy;  Surgeon: Chuck Chand MD;  [facility]     EXCISE MASS AXILLA  Left 9/16/2016    Procedure: EXCISE MASS AXILLA;  Surgeon: Keyon Blanco MD;  [facility]: [facility]     HC REMV CATARACT EXTRACAP,INSERT LENS  6/25/2009    Right eye     INSERT PORT VASCULAR ACCESS Right 10/7/2016    Procedure: INSERT PORT VASCULAR ACCESS;  Surgeon: Keyon Blanco MD;  [facility]     MASTECTOMY SIMPLE BILATERAL Bilateral 2/8/2017    Procedure: MASTECTOMY SIMPLE BILATERAL;  Surgeon: Keyon Blanco MD;  [facility]     OPEN REDUCTION INTERNAL FIXATION FOOT Right 3/20/2015    Procedure: OPEN REDUCTION INTERNAL FIXATION FOOT;  Surgeon: Javi Herrmann DPM;  [facility]     REMOVE PORT VASCULAR ACCESS Right 2/14/2018    Procedure: REMOVE PORT VASCULAR ACCESS;  right intra jugular port removal;  Surgeon: Keyon Blanco MD;  [facility]: [facility]     SHOULDER SURGERY Right 11/2015       Social History:   reports that she quit smoking about 39 years ago. Her smoking use included cigarettes. She has a 30.00 pack-year smoking history. she has never used smokeless tobacco. She reports that she drinks alcohol. She reports that she does not use drugs. Patient rides horses for fun.    Family History:  Family History   Problem Relation Age of Onset     Hypertension Mother      Thyroid Disease Mother      Cerebrovascular Disease Mother      Hypertension Father      Cerebrovascular Disease Father      Cancer Father         skin     Thyroid Disease Sister      Diabetes Brother         type 2     Depression Sister      Breast Cancer Sister         age 47     Cancer Sister         skin     Cancer Brother         inside nose       Medications:  Current Outpatient Medications   Medication Sig Dispense Refill     acetaminophen (TYLENOL EX ST ARTHRITIS PAIN) 500 MG tablet Take 500-1,000 mg by mouth every 6 hours as needed for mild pain       atorvastatin (LIPITOR) 20 MG tablet TAKE ONE TABLET BY MOUTH EVERY DAY 30 tablet 0     FLUoxetine (PROZAC) 40 MG capsule Take 1 capsule (40 mg) by mouth daily 90  capsule 1     hydrochlorothiazide (HYDRODIURIL) 25 MG tablet Take 1 tablet (25 mg) by mouth daily 90 tablet 1     oxyCODONE-acetaminophen (PERCOCET) 5-325 MG per tablet Take 1-2 tablets by mouth every 6 hours as needed for moderate to severe pain (#15 to last 30 days) 15 tablet 0     prochlorperazine (COMPAZINE) 10 MG tablet TAKE ONE TABLET BY MOUTH EVERY 6 HOURS AS NEEDED (NAUSEA/VOMITING) 30 tablet 3     TAMSULOSIN HCL PO Take 1 mg by mouth       ALPRAZolam (XANAX) 0.5 MG tablet Take 1 tablet (0.5 mg) by mouth 3 times daily as needed for anxiety (Patient not taking: Reported on 11/21/2018) 30 tablet 0     atorvastatin (LIPITOR) 20 MG tablet TAKE ONE TABLET BY MOUTH EVERY DAY. NEEDS FASTING LABS (Patient not taking: Reported on 12/11/2018) 90 tablet 0     fluocinonide (LIDEX) 0.05 % cream Apply thin layer twice daily to spots on left hand until resolved or up to two weeks. (Patient not taking: Reported on 10/25/2018) 30 g 0     gabapentin (NEURONTIN) 300 MG capsule Take 1 capsule (300 mg) by mouth At Bedtime (Patient not taking: Reported on 10/25/2018) 90 capsule 1     Lansoprazole (PREVACID PO) Take 40 mg by mouth       oxybutynin (DITROPAN) 5 MG tablet TAKE ONE-HALF TABLET BY MOUTH TWICE A DAY (Patient not taking: Reported on 12/11/2018) 90 tablet 1       Allergies   Allergen Reactions     No Known Drug Allergies        Review of Systems:  -Constitutional:  Feeling well, in usual state of health.  -Skin:  As per HPI, no additional concerns.    Physical exam:  Vitals: There were no vitals taken for this visit.  GEN: This is a well developed, well-nourished female in no acute distress, in a pleasant mood.    SKIN: Sun-exposed skin, which includes the head/face, neck, both arms, digits, and/or nails was examined.   - White type II skin.  - gritty pink papules scattered across the face - much improved from last visit but with >10 thin aks remaining  - Well healed scar on the right posterior shoulder. No pink  coloration or nodularity.   - No other lesions of concern on areas examined.       Impression/Plan:    1. Actinic keratosis, diffuse involvement of the face. Improved with first PDT but does have remaining thin AKs. Recommended a second treatment with the PDT as scheduled for 12/26/18. Patient advised to wait one month after second treatment and assess to see if she thinks third is needed. Instructed to follow up one month after last PDT for recheck in person as some areas may need cryotherapy if remaining. Advised patient to call for follow up appointment one month after her last treatment.   2. Scar, right posterior shoulder: No evidence of BCC in scar. Will monitor.     Follow-up in 2-3 months for recheck of AKs.      Staff Involved:  Scribe/Staff    Scribe Disclosure  I, Harleen Mccall, am serving as a scribe to document services personally performed by Dr. Yecenia Castle MD, based on data collection and the provider's statements to me.     Provider Disclosure:   The documentation recorded by the scribe accurately reflects the services I personally performed and the decisions made by me.    Yecenia Castle MD    Department of Dermatology  [facility]: Phone: [phone], Fax:[phone]  [facility]: Phone: [phone], Fax: [phone]                            Again, thank you for allowing me to participate in the care of your patient.        Sincerely,        Yecenia Castle MD

## 2018-12-11 NOTE — PROGRESS NOTES
[facility] Dermatology Note      Dermatology Problem List:  1.BCC, left cheek  -s/p Mohs 8/18/2016  2. Actinic keratosis bordering on SCCIS, upper chest (left chest)  -s/p excision 8/29/2011  3. Actinic keratosis  -s/p cryotherapy  -s/p PDT, 11/21/2018  4. History of lost biopsy specimen from R chest on 6/15/17. Repeat biopsy 8/2/17 showed only scar.  5. Suspected bug bites leading to prurigo nodules on left dorsal hand  -Treat with lidex 0.5% cream BID x 2 weeks, if fails to resolve consider biopsy to rule out SCC/HAK  6. Scar, right posterior shoulder, s/p bx 9/11/18 to rule out superficial BCC  7. Relevant medical history:  Breast cancer s/p surgery, chemo, radiation. Now in remission.    Encounter Date: Dec 11, 2018    CC:  Chief Complaint   Patient presents with     Follow Up     3 month follow up, recheck right shoulder and disucuss PDT     History of Present Illness:  Ms. Michaela Dorman is a 68 year old female who presents as a follow up after PDT. She was last seen 9/11/2018 by Dr. Castle. At last visit, we had discussed treatment efudex, but her pharmacy was not able to get it (pharmacy [facility] in [place]), so she decided to do PDT instead. She then treated with PDT treatment on 11/21/2018. She reports it was very painful, but she does note improvement in the scaly spots on her face. She was really red for a week after, but pain was only severe during treatment. She is scheduled for a second appt on 12/26/18. She notes biopsy area healed well on the right shoulder. She has not had any problems with it since biopsy. No other concerns addressed today.    Past Medical History:   Patient Active Problem List   Diagnosis     Enthesopathy of hip region     Family history of stroke (cerebrovascular)     Trochanteric bursitis     Advanced directives, counseling/discussion     Health Care Home     Hypertension goal BP (blood pressure) < 140/90     Baker's cyst of knee     Patella-femoral  syndrome     Adjustment disorder with depressed mood     Essential hypertension     Malignant neoplasm of upper-outer quadrant of left female breast (H)     Encounter for antineoplastic chemotherapy     S/P bilateral mastectomy     Anxiety     Hyperlipidemia LDL goal <130     S/P reverse total shoulder arthroplasty, right     Prophylactic antibiotic for dental procedure indicated due to prior joint replacement     Chronic pain syndrome     Lumbar radiculopathy     Foraminal stenosis of lumbar region     Central stenosis of spinal canal     DDD (degenerative disc disease), lumbar     Past Medical History:   Diagnosis Date     Central stenosis of spinal canal 12/1/2017    lumbar      DDD (degenerative disc disease), lumbar 12/1/2017     Depressive disorder, not elsewhere classified      Esophageal reflux      ETOH abuse     in remission     Foraminal stenosis of lumbar region 12/1/2017    Complete lumbar spine left at various degrees and to a lesser extent the right as well.     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     Subdural hematoma (H)      Past Surgical History:   Procedure Laterality Date     ARTHROPLASTY SHOULDER Right 11/17/2015     ARTHROSCOPY KNEE  6/7/2012    Procedure:ARTHROSCOPY KNEE; right knee arthroscopy with partial lateral menisectomy; Surgeon:DAMIÁN MCALLISTER; Location:[facility]     C LIGATE FALLOPIAN TUBE       C NONSPECIFIC PROCEDURE  1956    ankle fracture surgery     COLONOSCOPY N/A 12/22/2017    Procedure: COLONOSCOPY;  colonoscopy;  Surgeon: Chuck Chand MD;  [facility]     EXCISE MASS AXILLA Left 9/16/2016    Procedure: EXCISE MASS AXILLA;  Surgeon: Keyon Blanco MD;  [facility]     HC REMV CATARACT EXTRACAP,INSERT LENS  6/25/2009    Right eye     INSERT PORT VASCULAR ACCESS Right 10/7/2016    Procedure: INSERT PORT VASCULAR  ACCESS;  Surgeon: Keyon Blanco MD;  [facility]     MASTECTOMY SIMPLE BILATERAL Bilateral 2/8/2017    Procedure: MASTECTOMY SIMPLE BILATERAL;  Surgeon: Keyon Blanco MD;  [facility]     OPEN REDUCTION INTERNAL FIXATION FOOT Right 3/20/2015    Procedure: OPEN REDUCTION INTERNAL FIXATION FOOT;  Surgeon: Javi Herrmann DPM;  [facility]     REMOVE PORT VASCULAR ACCESS Right 2/14/2018    Procedure: REMOVE PORT VASCULAR ACCESS;  right intra jugular port removal;  Surgeon: Keyon Blanco MD;  [facility]: [facility]     SHOULDER SURGERY Right 11/2015       Social History:   reports that she quit smoking about 39 years ago. Her smoking use included cigarettes. She has a 30.00 pack-year smoking history. she has never used smokeless tobacco. She reports that she drinks alcohol. She reports that she does not use drugs. Patient rides horses for fun.    Family History:  Family History   Problem Relation Age of Onset     Hypertension Mother      Thyroid Disease Mother      Cerebrovascular Disease Mother      Hypertension Father      Cerebrovascular Disease Father      Cancer Father         skin     Thyroid Disease Sister      Diabetes Brother         type 2     Depression Sister      Breast Cancer Sister         age 47     Cancer Sister         skin     Cancer Brother         inside nose       Medications:  Current Outpatient Medications   Medication Sig Dispense Refill     acetaminophen (TYLENOL EX ST ARTHRITIS PAIN) 500 MG tablet Take 500-1,000 mg by mouth every 6 hours as needed for mild pain       atorvastatin (LIPITOR) 20 MG tablet TAKE ONE TABLET BY MOUTH EVERY DAY 30 tablet 0     FLUoxetine (PROZAC) 40 MG capsule Take 1 capsule (40 mg) by mouth daily 90 capsule 1     hydrochlorothiazide (HYDRODIURIL) 25 MG tablet Take 1 tablet (25 mg) by mouth daily 90 tablet 1     oxyCODONE-acetaminophen (PERCOCET) 5-325 MG per tablet Take 1-2 tablets by mouth every 6 hours as needed for moderate to severe pain  (#15 to last 30 days) 15 tablet 0     prochlorperazine (COMPAZINE) 10 MG tablet TAKE ONE TABLET BY MOUTH EVERY 6 HOURS AS NEEDED (NAUSEA/VOMITING) 30 tablet 3     TAMSULOSIN HCL PO Take 1 mg by mouth       ALPRAZolam (XANAX) 0.5 MG tablet Take 1 tablet (0.5 mg) by mouth 3 times daily as needed for anxiety (Patient not taking: Reported on 11/21/2018) 30 tablet 0     atorvastatin (LIPITOR) 20 MG tablet TAKE ONE TABLET BY MOUTH EVERY DAY. NEEDS FASTING LABS (Patient not taking: Reported on 12/11/2018) 90 tablet 0     fluocinonide (LIDEX) 0.05 % cream Apply thin layer twice daily to spots on left hand until resolved or up to two weeks. (Patient not taking: Reported on 10/25/2018) 30 g 0     gabapentin (NEURONTIN) 300 MG capsule Take 1 capsule (300 mg) by mouth At Bedtime (Patient not taking: Reported on 10/25/2018) 90 capsule 1     Lansoprazole (PREVACID PO) Take 40 mg by mouth       oxybutynin (DITROPAN) 5 MG tablet TAKE ONE-HALF TABLET BY MOUTH TWICE A DAY (Patient not taking: Reported on 12/11/2018) 90 tablet 1       Allergies   Allergen Reactions     No Known Drug Allergies        Review of Systems:  -Constitutional:  Feeling well, in usual state of health.  -Skin:  As per HPI, no additional concerns.    Physical exam:  Vitals: There were no vitals taken for this visit.  GEN: This is a well developed, well-nourished female in no acute distress, in a pleasant mood.    SKIN: Sun-exposed skin, which includes the head/face, neck, both arms, digits, and/or nails was examined.   - White type II skin.  - gritty pink papules scattered across the face - much improved from last visit but with >10 thin aks remaining  - Well healed scar on the right posterior shoulder. No pink coloration or nodularity.   - No other lesions of concern on areas examined.       Impression/Plan:    1. Actinic keratosis, diffuse involvement of the face. Improved with first PDT but does have remaining thin AKs. Recommended a second treatment  with the PDT as scheduled for 12/26/18. Patient advised to wait one month after second treatment and assess to see if she thinks third is needed. Instructed to follow up one month after last PDT for recheck in person as some areas may need cryotherapy if remaining. Advised patient to call for follow up appointment one month after her last treatment.   2. Scar, right posterior shoulder: No evidence of BCC in scar. Will monitor.     Follow-up in 2-3 months for recheck of AKs.      Staff Involved:  Scribe/Staff    Scribe Disclosure  I, Harleen Mccall, am serving as a scribe to document services personally performed by Dr. Yecenia Castle MD, based on data collection and the provider's statements to me.     Provider Disclosure:   The documentation recorded by the scribe accurately reflects the services I personally performed and the decisions made by me.    Yecenia Castle MD    Department of Dermatology  [facility] [place][note 2]: Phone: [phone], Fax:[phone]  [facility]: Phone: [phone], Fax: [phone]

## 2018-12-11 NOTE — NURSING NOTE
Michaela Dorman's goals for this visit include:   Chief Complaint   Patient presents with     Follow Up     3 month follow up, recheck right shoulder and disucuss PDT     She requests these members of her care team be copied on today's visit information:     PCP: Phani Tapia    Referring Provider:  No referring provider defined for this encounter.    There were no vitals taken for this visit.    Do you need any medication refills at today's visit? No    Aminta Keane LPN

## 2018-12-26 DIAGNOSIS — F43.21 ADJUSTMENT DISORDER WITH DEPRESSED MOOD: ICD-10-CM

## 2018-12-28 RX ORDER — FLUOXETINE 40 MG/1
CAPSULE ORAL
Qty: 30 CAPSULE | Refills: 0 | Status: SHIPPED | OUTPATIENT
Start: 2018-12-28 | End: 2019-02-05

## 2018-12-28 NOTE — TELEPHONE ENCOUNTER
Left message for patient to return call to clinic. When call is returned please inform patient a one month script has been filled for her FLUoxetine (PROZAC)    Viral Jones,

## 2018-12-28 NOTE — TELEPHONE ENCOUNTER
Medication is being filled for 1 time refill only due to:  Patient needs to be seen because due for depression f/u.    Last Written Prescription Date:  6/5/18  Last Fill Quantity: 90,  # refills: 1   Last office visit: 6/5/2018   Future Office Visit:   Next 5 appointments (look out 90 days)    Jan 07, 2019  1:30 PM CST  Nurse Only with NURSE ONLY [facility]  [facility] ([facility] [id number] [place] [id number] [phone]         PHQ-9 SCORE 9/25/2017 11/30/2017 6/5/2018   PHQ-9 Total Score - - -   PHQ-9 Total Score MyChart 12 (Moderate depression) 5 (Mild depression) 1 (Minimal depression)   PHQ-9 Total Score 12 5 1

## 2019-01-04 NOTE — PROGRESS NOTES
SUBJECTIVE:   Michaela Dorman is a 68 year old female who presents to clinic today for the following health issues:  Please review medication list.     History of Present Illness     Depression & Anxiety Follow-up:     Depression/Anxiety:  Depression & Anxiety    Status since last visit::  Stable    Other associated symptoms of depression and anxiety::  None    Significant life event::  No    Current substance use::  Alcohol and Prescription Drugs       Today's PHQ-9         PHQ-9 Total Score:     (P) 0   PHQ-9 Q9 Suicidal ideation:   (P) Not at all   Thoughts of suicide or self harm:      Self-harm Plan:        Self-harm Action:          Safety concerns for self or others:       AUSTIN-7 Total Score: (P) 0  Frequency of exercise:  4-5 days/week  Taking medications regularly:  Yes    Problem list and histories reviewed & adjusted, as indicated.  Additional history: as documented    Patient Active Problem List   Diagnosis     Enthesopathy of hip region     Family history of stroke (cerebrovascular)     Trochanteric bursitis     Advanced directives, counseling/discussion     Health Care Home     Hypertension goal BP (blood pressure) < 140/90     Baker's cyst of knee     Patella-femoral syndrome     Adjustment disorder with depressed mood     Essential hypertension     Malignant neoplasm of upper-outer quadrant of left female breast (H)     Encounter for antineoplastic chemotherapy     S/P bilateral mastectomy     Anxiety     Hyperlipidemia LDL goal <130     S/P reverse total shoulder arthroplasty, right     Prophylactic antibiotic for dental procedure indicated due to prior joint replacement     Chronic pain syndrome     Lumbar radiculopathy     Foraminal stenosis of lumbar region     Central stenosis of spinal canal     DDD (degenerative disc disease), lumbar     Past Surgical History:   Procedure Laterality Date     ARTHROPLASTY SHOULDER Right 11/17/2015     ARTHROSCOPY KNEE  6/7/2012    Procedure:ARTHROSCOPY KNEE;  right knee arthroscopy with partial lateral menisectomy; Surgeon:DAMIÁN MCALLISTER; [facility]     C LIGATE FALLOPIAN TUBE       C NONSPECIFIC PROCEDURE      ankle fracture surgery     COLONOSCOPY N/A 2017    Procedure: COLONOSCOPY;  colonoscopy;  Surgeon: Chuck Chand MD;  [facility]     EXCISE MASS AXILLA Left 2016    Procedure: EXCISE MASS AXILLA;  Surgeon: Keyon Blanco MD;  [facility]     HC REMV CATARACT EXTRACAP,INSERT LENS  2009    Right eye     INSERT PORT VASCULAR ACCESS Right 10/7/2016    Procedure: INSERT PORT VASCULAR ACCESS;  Surgeon: Keyon Blanco MD;  [facility]     MASTECTOMY SIMPLE BILATERAL Bilateral 2017    Procedure: MASTECTOMY SIMPLE BILATERAL;  Surgeon: Keyon Blanco MD;  [facility]     OPEN REDUCTION INTERNAL FIXATION FOOT Right 3/20/2015    Procedure: OPEN REDUCTION INTERNAL FIXATION FOOT;  Surgeon: Javi Herrmann DPM;  [facility]     REMOVE PORT VASCULAR ACCESS Right 2018    Procedure: REMOVE PORT VASCULAR ACCESS;  right intra jugular port removal;  Surgeon: Keyon Blanco MD;  [facility]     SHOULDER SURGERY Right 2015       Social History     Tobacco Use     Smoking status: Former Smoker     Packs/day: 3.00     Years: 10.00     Pack years: 30.00     Types: Cigarettes     Last attempt to quit: 1979     Years since quittin.1     Smokeless tobacco: Never Used     Tobacco comment: approx. 3 packs daily   Substance Use Topics     Alcohol use: Yes     Alcohol/week: 0.0 oz     Comment: daily glass of wine     Family History   Problem Relation Age of Onset     Hypertension Mother      Thyroid Disease Mother      Cerebrovascular Disease Mother      Hypertension Father      Cerebrovascular Disease Father      Cancer Father         skin     Thyroid Disease Sister      Diabetes Brother         type 2     Depression Sister      Breast Cancer Sister         age 47     Cancer Sister         skin      Cancer Brother         inside nose         Current Outpatient Medications   Medication Sig Dispense Refill     acetaminophen (TYLENOL EX ST ARTHRITIS PAIN) 500 MG tablet Take 500-1,000 mg by mouth every 6 hours as needed for mild pain       atorvastatin (LIPITOR) 20 MG tablet TAKE ONE TABLET BY MOUTH EVERY DAY. NEEDS FASTING LABS 1 tablet 0     atorvastatin (LIPITOR) 20 MG tablet TAKE ONE TABLET BY MOUTH EVERY DAY 30 tablet 0     fluocinonide (LIDEX) 0.05 % cream Apply thin layer twice daily to spots on left hand until resolved or up to two weeks. 30 g 0     FLUoxetine (PROZAC) 40 MG capsule TAKE ONE CAPSULE BY MOUTH EVERY DAY 30 capsule 0     hydrochlorothiazide (HYDRODIURIL) 25 MG tablet Take 1 tablet (25 mg) by mouth daily 90 tablet 1     Lansoprazole (PREVACID PO) Take 40 mg by mouth       mupirocin (BACTROBAN) 2 % external ointment Apply 2 times daily to chin for 2 weeks 30 g 0     oxyCODONE-acetaminophen (PERCOCET) 5-325 MG tablet Take 1-2 tablets by mouth every 6 hours as needed for moderate to severe pain (#15 to last 30 days) 15 tablet 0     prochlorperazine (COMPAZINE) 10 MG tablet TAKE ONE TABLET BY MOUTH EVERY 6 HOURS AS NEEDED (NAUSEA/VOMITING) 30 tablet 3     TAMSULOSIN HCL PO Take 1 mg by mouth       Allergies   Allergen Reactions     No Known Drug Allergies      Recent Labs   Lab Test 11/15/18  0953 09/10/18  0828 05/11/18  1012 02/12/18  0917  09/18/17  0840  01/24/17  1016  08/31/16  1442   A1C  --   --   --   --   --   --   --  5.0  --   --    LDL  --  108*  --   --   --  170*  --   --   --  150*   HDL  --  90  --   --   --  67  --   --   --   --    TRIG  --  89  --   --   --  178*  --   --   --   --    ALT 42  --  44 22   < > 25   < > 49   < >  --    CR 1.17*  --  0.81 0.87   < > 0.98   < > 0.92   < >  --    GFRESTIMATED 46*  --  70 64   < > 57*   < > 61   < >  --    GFRESTBLACK 55*  --  84 78   < > 69   < > 73   < >  --    POTASSIUM 3.8  --  4.2 3.9   < > 4.2   < > 3.9   < >  --    TSH  --   --   " --   --   --   --   --  1.34  --  0.39*    < > = values in this interval not displayed.      BP Readings from Last 3 Encounters:   01/11/19 126/78   11/20/18 128/62   10/25/18 104/66    Wt Readings from Last 3 Encounters:   01/11/19 62.1 kg (136 lb 12.8 oz)   11/20/18 62.2 kg (137 lb 3.2 oz)   10/25/18 62.6 kg (138 lb)                  Labs reviewed in EPIC    ROS:  Constitutional, HEENT, cardiovascular, pulmonary, GI, , musculoskeletal, neuro, skin, endocrine and psych systems are negative, except as otherwise noted.    OBJECTIVE:     /78 (Cuff Size: Adult Regular)   Pulse 76   Temp 98.2  F (36.8  C) (Temporal)   Resp 18   Ht 1.676 m (5' 6\")   Wt 62.1 kg (136 lb 12.8 oz)   SpO2 97%   BMI 22.08 kg/m    Body mass index is 22.08 kg/m .  GENERAL: healthy, alert and no distress  RESP: lungs clear to auscultation - no rales, rhonchi or wheezes  CV: regular rate and rhythm, normal S1 S2, no S3 or S4, no murmur, click or rub, no peripheral edema and peripheral pulses strong  MS: no gross musculoskeletal defects noted, no edema  NEURO: Normal strength and tone, mentation intact and speech normal  PSYCH: mentation appears normal, affect normal/bright    Diagnostic Test Results:  No results found. However, due to the size of the patient record, not all encounters were searched. Please check Results Review for a complete set of results.    ASSESSMENT/PLAN:     1. Visit for screening mammogram  Has had a simple mastectomy bilaterally.  Advised that she contact oncology for recommendations related to screening for breast cancer.  She may need to have chest wall ultrasound related to this.    2. Need for prophylactic vaccination and inoculation against influenza  Declines    3. Chronic pain syndrome  4. DDD (degenerative disc disease), lumbar  Refilled as requested no new concerns.  Recheck in 3 months    - oxyCODONE-acetaminophen (PERCOCET) 5-325 MG tablet; Take 1-2 tablets by mouth every 6 hours as needed for " moderate to severe pain (#15 to last 30 days)  Dispense: 15 tablet; Refill: 0    5. Hyperlipidemia LDL goal <130  She is to contact oncology- atorvastatin (LIPITOR) 20 MG tablet; TAKE ONE TABLET BY MOUTH EVERY DAY. NEEDS FASTING LABS  Dispense: 1 tablet; Refill: 0    6. Malignant neoplasm of upper-outer quadrant of left female breast, unspecified estrogen receptor status (H)  For recommendations related to screening she is to contact oncology.    Phani Jason PA-C  [facility]  Answers for HPI/ROS submitted by the patient on 1/11/2019   Chronic problems general questions HPI Form  AUSTIN 7 TOTAL SCORE: 0  If you checked off any problems, how difficult have these problems made it for you to do your work, take care of things at home, or get along with other people?: Not difficult at all  PHQ9 TOTAL SCORE: 0

## 2019-01-07 ENCOUNTER — ALLIED HEALTH/NURSE VISIT (OUTPATIENT)
Dept: NURSING | Facility: CLINIC | Age: 69
End: 2019-01-07
Payer: COMMERCIAL

## 2019-01-07 DIAGNOSIS — T14.8XXA EXCORIATION: Primary | ICD-10-CM

## 2019-01-07 PROCEDURE — 99207 ZZC NO CHARGE NURSE ONLY: CPT

## 2019-01-07 RX ORDER — MUPIROCIN 20 MG/G
OINTMENT TOPICAL
Qty: 30 G | Refills: 0 | Status: SHIPPED | OUTPATIENT
Start: 2019-01-07 | End: 2019-02-05

## 2019-01-07 NOTE — PROGRESS NOTES
Pt presents to clinic today for PDT treatment number 2. Upon rooming pt, pt made LPN aware of open area on chin. States she has had it for about 10 days, started as an itchy, rash like area in which she scratched during the night and it opened up and now it will not heal. LPN brought in RN to assess, RN advised we have Dr. Mcintosh take a look at it. Dr. Mcintosh looked at the area and advised we do not proceed with today's treatment, and that we start Mupirocin ointment BID x 2 weeks and vinegar soaks BID x 2 weeks. Went through instructions for soaks with pt who verbalized understanding and had no other questions or concerns. PDT rescheduled for January 28th at 1:30 pm and RN sent in Rx for the ointment. Pt made aware to call clinic if area is not getting better within the next few days, or for any other concerns.    Aminta Keane LPN

## 2019-01-07 NOTE — PATIENT INSTRUCTIONS
1/4% Topical Acetic Acid Solution and Soaks    What is acetic acid solution and what is it used for?    1/4% acetic acid is a vinegar mixture that can be made at home.    It is used to help keep wounds clean and stop infection.    How do I make the solution?    Clean a quart size or larger jar and lid with hot soapy water. Rinse jar well.     The jar and lid can also go in the . Use the hottest and longest cycle.     Add 3 tablespoons plus 1 teaspoon household white vinegar to 4 cups (1 quart) of water and mix well. White vinegar is 5% acetic acid.     Store the acetic acid mixture in the jar and keep it refrigerated.     Make sure the solution is room temperature before using it.     How do I do an acetic acid soak?    Pour acetic acid solution onto gauze or a clean wash cloth. Wring it out gently.    Put on the affected area.    Leave on the affected area for 15-20 minutes and then remove. The gauze/cloth should be slightly damp when you remove it.     You might need to do some more cleaning of the area after the soak. Your doctor will tell you if you need to do this.     Rinse the area briefly with fresh clean water.     Put on medications and dressings to the area as ordered by your doctor.    Throw away unused solution after 24 hours. Make a new bottle each day.     Who should I call with questions?    [facility]: [phone]     [facility]: [phone]    For urgent needs outside of business hours call the [facility] at [phone] and ask for the dermatology resident on call

## 2019-01-11 ENCOUNTER — OFFICE VISIT (OUTPATIENT)
Dept: FAMILY MEDICINE | Facility: OTHER | Age: 69
End: 2019-01-11
Payer: COMMERCIAL

## 2019-01-11 VITALS
SYSTOLIC BLOOD PRESSURE: 126 MMHG | TEMPERATURE: 98.2 F | WEIGHT: 136.8 LBS | HEIGHT: 66 IN | OXYGEN SATURATION: 97 % | BODY MASS INDEX: 21.98 KG/M2 | DIASTOLIC BLOOD PRESSURE: 78 MMHG | HEART RATE: 76 BPM | RESPIRATION RATE: 18 BRPM

## 2019-01-11 DIAGNOSIS — G89.4 CHRONIC PAIN SYNDROME: ICD-10-CM

## 2019-01-11 DIAGNOSIS — M51.369 DDD (DEGENERATIVE DISC DISEASE), LUMBAR: ICD-10-CM

## 2019-01-11 DIAGNOSIS — Z12.31 VISIT FOR SCREENING MAMMOGRAM: ICD-10-CM

## 2019-01-11 DIAGNOSIS — C50.412 MALIGNANT NEOPLASM OF UPPER-OUTER QUADRANT OF LEFT FEMALE BREAST, UNSPECIFIED ESTROGEN RECEPTOR STATUS (H): ICD-10-CM

## 2019-01-11 DIAGNOSIS — Z23 NEED FOR PROPHYLACTIC VACCINATION AND INOCULATION AGAINST INFLUENZA: ICD-10-CM

## 2019-01-11 DIAGNOSIS — E78.5 HYPERLIPIDEMIA LDL GOAL <130: ICD-10-CM

## 2019-01-11 PROCEDURE — 99213 OFFICE O/P EST LOW 20 MIN: CPT | Performed by: PHYSICIAN ASSISTANT

## 2019-01-11 RX ORDER — OXYCODONE AND ACETAMINOPHEN 5; 325 MG/1; MG/1
1-2 TABLET ORAL EVERY 6 HOURS PRN
Qty: 15 TABLET | Refills: 0 | Status: SHIPPED | OUTPATIENT
Start: 2019-01-11 | End: 2019-03-05

## 2019-01-11 RX ORDER — ATORVASTATIN CALCIUM 20 MG/1
TABLET, FILM COATED ORAL
Qty: 1 TABLET | Refills: 0 | COMMUNITY
Start: 2019-01-11 | End: 2019-01-28

## 2019-01-11 ASSESSMENT — PATIENT HEALTH QUESTIONNAIRE - PHQ9
SUM OF ALL RESPONSES TO PHQ QUESTIONS 1-9: 0
SUM OF ALL RESPONSES TO PHQ QUESTIONS 1-9: 0
10. IF YOU CHECKED OFF ANY PROBLEMS, HOW DIFFICULT HAVE THESE PROBLEMS MADE IT FOR YOU TO DO YOUR WORK, TAKE CARE OF THINGS AT HOME, OR GET ALONG WITH OTHER PEOPLE: NOT DIFFICULT AT ALL

## 2019-01-11 ASSESSMENT — ANXIETY QUESTIONNAIRES
7. FEELING AFRAID AS IF SOMETHING AWFUL MIGHT HAPPEN: NOT AT ALL
GAD7 TOTAL SCORE: 0
6. BECOMING EASILY ANNOYED OR IRRITABLE: NOT AT ALL
5. BEING SO RESTLESS THAT IT IS HARD TO SIT STILL: NOT AT ALL
4. TROUBLE RELAXING: NOT AT ALL
3. WORRYING TOO MUCH ABOUT DIFFERENT THINGS: NOT AT ALL
7. FEELING AFRAID AS IF SOMETHING AWFUL MIGHT HAPPEN: NOT AT ALL
GAD7 TOTAL SCORE: 0
2. NOT BEING ABLE TO STOP OR CONTROL WORRYING: NOT AT ALL
1. FEELING NERVOUS, ANXIOUS, OR ON EDGE: NOT AT ALL
GAD7 TOTAL SCORE: 0

## 2019-01-11 ASSESSMENT — PAIN SCALES - GENERAL: PAINLEVEL: MILD PAIN (2)

## 2019-01-11 ASSESSMENT — MIFFLIN-ST. JEOR: SCORE: 1162.27

## 2019-01-11 NOTE — PATIENT INSTRUCTIONS
Call urology about side effects to Tamsulosin.  Call oncology for breast imaging for surveillance.  Electronically signed:    Phani Jason PA-C

## 2019-01-12 ASSESSMENT — ANXIETY QUESTIONNAIRES: GAD7 TOTAL SCORE: 0

## 2019-01-12 ASSESSMENT — PATIENT HEALTH QUESTIONNAIRE - PHQ9: SUM OF ALL RESPONSES TO PHQ QUESTIONS 1-9: 0

## 2019-01-13 ENCOUNTER — MYC MEDICAL ADVICE (OUTPATIENT)
Dept: ONCOLOGY | Facility: CLINIC | Age: 69
End: 2019-01-13

## 2019-01-22 ENCOUNTER — OFFICE VISIT (OUTPATIENT)
Dept: DERMATOLOGY | Facility: CLINIC | Age: 69
End: 2019-01-22
Payer: COMMERCIAL

## 2019-01-22 DIAGNOSIS — L20.84 INTRINSIC ECZEMA: Primary | ICD-10-CM

## 2019-01-22 PROCEDURE — 99212 OFFICE O/P EST SF 10 MIN: CPT | Performed by: DERMATOLOGY

## 2019-01-22 RX ORDER — HYDROCORTISONE 25 MG/G
OINTMENT TOPICAL
Qty: 20 G | Refills: 0 | Status: SHIPPED | OUTPATIENT
Start: 2019-01-22 | End: 2020-01-13

## 2019-01-22 ASSESSMENT — PAIN SCALES - GENERAL: PAINLEVEL: NO PAIN (0)

## 2019-01-22 NOTE — NURSING NOTE
Michaela Dorman's goals for this visit include:   Chief Complaint   Patient presents with     Spot check     Spot check of spot on chin. Has been using mupirocin ointment.      She requests these members of her care team be copied on today's visit information:     PCP: Phani Tapia    Referring Provider:  No referring provider defined for this encounter.    There were no vitals taken for this visit.    Do you need any medication refills at today's visit? No    Aminta Keane LPN

## 2019-01-22 NOTE — PROGRESS NOTES
[facility] Dermatology Note      Dermatology Problem List:  1.BCC, left cheek  -s/p Mohs 8/18/2016  2. Actinic keratosis bordering on SCCIS, upper chest (left chest)  -s/p excision 8/29/2011  3. Actinic keratosis  -s/p cryotherapy  -s/p PDT, 11/21/2018  4. History of lost biopsy specimen from R chest on 6/15/17. Repeat biopsy 8/2/17 showed only scar.  5. Suspected bug bites leading to prurigo nodules on left dorsal hand  -Treat with lidex 0.5% cream BID x 2 weeks, if fails to resolve consider biopsy to rule out SCC/HAK  6. Scar, right posterior shoulder, s/p bx 9/11/18 to rule out superficial BCC  7. Relevant medical history:  Breast cancer s/p surgery, chemo, radiation. Now in remission.  8. Itchy rash on chin with secondary impetiginization:  -S/p mupirocin with resolution of crusting  -current tx: hydrocortisone 2.5% ointment BID to spot treat until resolved    Encounter Date: Jan 22, 2019    CC:  Chief Complaint   Patient presents with     Spot check     Spot check of spot on chin. Has been using mupirocin ointment.      History of Present Illness:  Ms. Michaela Dorman is a 68 year old female who presents as a follow up after PDT. She was last seen 1/7/2019 for PDT, however she had a spot on her face that was open, so they did not proceed with PDT. She was prescribed mupirocin 2% ointment at that time. She has been applying it, but the lesion is still there and itchy. It is so itchy that it keeps her up at night. She has scratched to the point of breaking down the skin. She thinks this started after first PDT session. She has another spot on her abdomen she is wondering if it related. She rescheduled the PDT for Monday and is wondering if she needs to move it again. No other concerns addressed today.      Past Medical History:   Patient Active Problem List   Diagnosis     Enthesopathy of hip region     Family history of stroke (cerebrovascular)     Trochanteric bursitis     Advanced  directives, counseling/discussion     Health Care Home     Hypertension goal BP (blood pressure) < 140/90     Baker's cyst of knee     Patella-femoral syndrome     Adjustment disorder with depressed mood     Essential hypertension     Malignant neoplasm of upper-outer quadrant of left female breast (H)     Encounter for antineoplastic chemotherapy     S/P bilateral mastectomy     Anxiety     Hyperlipidemia LDL goal <130     S/P reverse total shoulder arthroplasty, right     Prophylactic antibiotic for dental procedure indicated due to prior joint replacement     Chronic pain syndrome     Lumbar radiculopathy     Foraminal stenosis of lumbar region     Central stenosis of spinal canal     DDD (degenerative disc disease), lumbar     Past Medical History:   Diagnosis Date     Central stenosis of spinal canal 12/1/2017    lumbar      DDD (degenerative disc disease), lumbar 12/1/2017     Depressive disorder, not elsewhere classified      Esophageal reflux      ETOH abuse     in remission     Foraminal stenosis of lumbar region 12/1/2017    Complete lumbar spine left at various degrees and to a lesser extent the right as well.     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     Subdural hematoma (H)      Past Surgical History:   Procedure Laterality Date     ARTHROPLASTY SHOULDER Right 11/17/2015     ARTHROSCOPY KNEE  6/7/2012    Procedure:ARTHROSCOPY KNEE; right knee arthroscopy with partial lateral menisectomy; Surgeon:DAMIÁN MCALLISTER; Location: OR     C LIGATE FALLOPIAN TUBE       C NONSPECIFIC PROCEDURE  1956    ankle fracture surgery     COLONOSCOPY N/A 12/22/2017    Procedure: COLONOSCOPY;  colonoscopy;  Surgeon: Chuck Chand MD;  Location:  GI     EXCISE MASS AXILLA Left 9/16/2016    Procedure: EXCISE MASS AXILLA;  Surgeon: Keyon Blanco MD;  Location:   OR     HC REMV CATARACT EXTRACAP,INSERT LENS  6/25/2009    Right eye     INSERT PORT VASCULAR ACCESS Right 10/7/2016    Procedure: INSERT PORT VASCULAR ACCESS;  Surgeon: Keyon Blanco MD;  [facility]     MASTECTOMY SIMPLE BILATERAL Bilateral 2/8/2017    Procedure: MASTECTOMY SIMPLE BILATERAL;  Surgeon: Keyon Blanco MD;  [facility]     OPEN REDUCTION INTERNAL FIXATION FOOT Right 3/20/2015    Procedure: OPEN REDUCTION INTERNAL FIXATION FOOT;  Surgeon: Javi Herrmann DPM;  [facility]     REMOVE PORT VASCULAR ACCESS Right 2/14/2018    Procedure: REMOVE PORT VASCULAR ACCESS;  right intra jugular port removal;  Surgeon: Keyon Blanco MD;  [facility]     SHOULDER SURGERY Right 11/2015       Social History:   reports that she quit smoking about 39 years ago. Her smoking use included cigarettes. She has a 30.00 pack-year smoking history. she has never used smokeless tobacco. She reports that she drinks alcohol. She reports that she does not use drugs. Patient rides horses for fun.    Family History:  Family History   Problem Relation Age of Onset     Hypertension Mother      Thyroid Disease Mother      Cerebrovascular Disease Mother      Hypertension Father      Cerebrovascular Disease Father      Cancer Father         skin     Thyroid Disease Sister      Diabetes Brother         type 2     Depression Sister      Breast Cancer Sister         age 47     Cancer Sister         skin     Cancer Brother         inside nose       Medications:  Current Outpatient Medications   Medication Sig Dispense Refill     acetaminophen (TYLENOL EX ST ARTHRITIS PAIN) 500 MG tablet Take 500-1,000 mg by mouth every 6 hours as needed for mild pain       atorvastatin (LIPITOR) 20 MG tablet TAKE ONE TABLET BY MOUTH EVERY DAY. NEEDS FASTING LABS 1 tablet 0     atorvastatin (LIPITOR) 20 MG tablet TAKE ONE TABLET BY MOUTH EVERY DAY 30 tablet 0     fluocinonide (LIDEX) 0.05 % cream Apply thin layer twice daily to  spots on left hand until resolved or up to two weeks. 30 g 0     FLUoxetine (PROZAC) 40 MG capsule TAKE ONE CAPSULE BY MOUTH EVERY DAY 30 capsule 0     hydrochlorothiazide (HYDRODIURIL) 25 MG tablet Take 1 tablet (25 mg) by mouth daily 90 tablet 1     Lansoprazole (PREVACID PO) Take 40 mg by mouth       prochlorperazine (COMPAZINE) 10 MG tablet TAKE ONE TABLET BY MOUTH EVERY 6 HOURS AS NEEDED (NAUSEA/VOMITING) 30 tablet 3     TAMSULOSIN HCL PO Take 1 mg by mouth       oxyCODONE-acetaminophen (PERCOCET) 5-325 MG tablet Take 1-2 tablets by mouth every 6 hours as needed for moderate to severe pain (#15 to last 30 days) (Patient not taking: Reported on 1/22/2019) 15 tablet 0       Allergies   Allergen Reactions     No Known Drug Allergies        Review of Systems:  -Constitutional:  Feeling well, in usual state of health.  -Skin:  As per HPI, no additional concerns.    Physical exam:  Vitals: There were no vitals taken for this visit.  GEN: This is a well developed, well-nourished female in no acute distress, in a pleasant mood.    SKIN: Sun-exposed skin, which includes the head/face, neck, both arms, digits, abdomen, and/or nails was examined.   - White type II skin.  - Subtle, slightly pink circular plaque around 6mm in size on the chin, linear excoriation through it  - There are several remaining very thin gritty pink macules on the face.   - No other lesions of concern on areas examined.       Impression/Plan:    1. Eczematous dermatitis. Likely secondarily impetiginized due to scratching in the past. No sign of infection after tx with mupirocin ointment. Will stop mupirocin, and start hydrocortisone 2.5% BID until next PDT visit. At that time, will have nursing staff re-exam and determine if Dr. Mcintosh needs to see prior to proceeding with PDT. Given that only one small excoriation remaining, I do think we can go through with PDT even if this is not totally healed.     Hold mupirocin 2% ointment.      Prescribed hydrocortisone 2.5% ointment BID until resolved. Will have doctor reassess on Monday if still needing to use.    Patient can stop the medication if the itchiness and redness stops.     Follow-up following next appt of PDT on Monday, one month for recheck of AKs after PDT.        Staff Involved:  Scribe/Staff    Scribe Disclosure  I, Harleen Mccall, am serving as a scribe to document services personally performed by Dr. Yecenia Castle MD, based on data collection and the provider's statements to me.     Provider Disclosure:   The documentation recorded by the scribe accurately reflects the services I personally performed and the decisions made by me.    Yecenia Castle MD    Department of Dermatology  [facility]: Phone: [phone], Fax:[phone]  [facility]: Phone: [phone], Fax: [phone]

## 2019-01-22 NOTE — LETTER
1/22/2019         RE: Michaela Dorman  [address]  [facility] [address]        Dear Colleague,    Thank you for referring your patient, Michaela Dorman, to the [facility]. Please see a copy of my visit note below.        [facility] Dermatology Note      Dermatology Problem List:  1.BCC, left cheek  -s/p Mohs 8/18/2016  2. Actinic keratosis bordering on SCCIS, upper chest (left chest)  -s/p excision 8/29/2011  3. Actinic keratosis  -s/p cryotherapy  -s/p PDT, 11/21/2018  4. History of lost biopsy specimen from R chest on 6/15/17. Repeat biopsy 8/2/17 showed only scar.  5. Suspected bug bites leading to prurigo nodules on left dorsal hand  -Treat with lidex 0.5% cream BID x 2 weeks, if fails to resolve consider biopsy to rule out SCC/HAK  6. Scar, right posterior shoulder, s/p bx 9/11/18 to rule out superficial BCC  7. Relevant medical history:  Breast cancer s/p surgery, chemo, radiation. Now in remission.  8. Itchy rash on chin with secondary impetiginization:  -S/p mupirocin with resolution of crusting  -current tx: hydrocortisone 2.5% ointment BID to spot treat until resolved    Encounter Date: Jan 22, 2019    CC:  Chief Complaint   Patient presents with     Spot check     Spot check of spot on chin. Has been using mupirocin ointment.      History of Present Illness:  Ms. Michaela Dorman is a 68 year old female who presents as a follow up after PDT. She was last seen 1/7/2019 for PDT, however she had a spot on her face that was open, so they did not proceed with PDT. She was prescribed mupirocin 2% ointment at that time. She has been applying it, but the lesion is still there and itchy. It is so itchy that it keeps her up at night. She has scratched to the point of breaking down the skin. She thinks this started after first PDT session. She has another spot on her abdomen she is wondering if it related. She rescheduled the PDT for Monday and is wondering if she  needs to move it again. No other concerns addressed today.      Past Medical History:   Patient Active Problem List   Diagnosis     Enthesopathy of hip region     Family history of stroke (cerebrovascular)     Trochanteric bursitis     Advanced directives, counseling/discussion     Health Care Home     Hypertension goal BP (blood pressure) < 140/90     Baker's cyst of knee     Patella-femoral syndrome     Adjustment disorder with depressed mood     Essential hypertension     Malignant neoplasm of upper-outer quadrant of left female breast (H)     Encounter for antineoplastic chemotherapy     S/P bilateral mastectomy     Anxiety     Hyperlipidemia LDL goal <130     S/P reverse total shoulder arthroplasty, right     Prophylactic antibiotic for dental procedure indicated due to prior joint replacement     Chronic pain syndrome     Lumbar radiculopathy     Foraminal stenosis of lumbar region     Central stenosis of spinal canal     DDD (degenerative disc disease), lumbar     Past Medical History:   Diagnosis Date     Central stenosis of spinal canal 12/1/2017    lumbar      DDD (degenerative disc disease), lumbar 12/1/2017     Depressive disorder, not elsewhere classified      Esophageal reflux      ETOH abuse     in remission     Foraminal stenosis of lumbar region 12/1/2017    Complete lumbar spine left at various degrees and to a lesser extent the right as well.     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     Subdural hematoma (H)      Past Surgical History:   Procedure Laterality Date     ARTHROPLASTY SHOULDER Right 11/17/2015     ARTHROSCOPY KNEE  6/7/2012    Procedure:ARTHROSCOPY KNEE; right knee arthroscopy with partial lateral menisectomy; Surgeon:DAMIÁN MCALLISTER; Location:[facility] OR     C LIGATE FALLOPIAN TUBE       C NONSPECIFIC PROCEDURE  1956    ankle fracture  surgery     COLONOSCOPY N/A 12/22/2017    Procedure: COLONOSCOPY;  colonoscopy;  Surgeon: Chuck Chand MD;  Location: [facility] GI     EXCISE MASS AXILLA Left 9/16/2016    Procedure: EXCISE MASS AXILLA;  Surgeon: Keyon Blanco MD;  Location: [facility] OR     HC REMV CATARACT EXTRACAP,INSERT LENS  6/25/2009    Right eye     INSERT PORT VASCULAR ACCESS Right 10/7/2016    Procedure: INSERT PORT VASCULAR ACCESS;  Surgeon: Keyon Blanco MD;  Location: [facility]     MASTECTOMY SIMPLE BILATERAL Bilateral 2/8/2017    Procedure: MASTECTOMY SIMPLE BILATERAL;  Surgeon: Keyon Blanco MD;  Location: [facility] OR     OPEN REDUCTION INTERNAL FIXATION FOOT Right 3/20/2015    Procedure: OPEN REDUCTION INTERNAL FIXATION FOOT;  Surgeon: Javi Herrmann DPM;  Location: [facility] OR     REMOVE PORT VASCULAR ACCESS Right 2/14/2018    Procedure: REMOVE PORT VASCULAR ACCESS;  right intra jugular port removal;  Surgeon: Keyon Balnco MD;  Location: [facility] OR     SHOULDER SURGERY Right 11/2015       Social History:   reports that she quit smoking about 39 years ago. Her smoking use included cigarettes. She has a 30.00 pack-year smoking history. she has never used smokeless tobacco. She reports that she drinks alcohol. She reports that she does not use drugs. Patient rides horses for fun.    Family History:  Family History   Problem Relation Age of Onset     Hypertension Mother      Thyroid Disease Mother      Cerebrovascular Disease Mother      Hypertension Father      Cerebrovascular Disease Father      Cancer Father         skin     Thyroid Disease Sister      Diabetes Brother         type 2     Depression Sister      Breast Cancer Sister         age 47     Cancer Sister         skin     Cancer Brother         inside nose       Medications:  Current Outpatient Medications   Medication Sig Dispense Refill     acetaminophen (TYLENOL EX ST ARTHRITIS PAIN) 500 MG tablet Take 500-1,000 mg by mouth every 6 hours as needed for mild pain       atorvastatin  (LIPITOR) 20 MG tablet TAKE ONE TABLET BY MOUTH EVERY DAY. NEEDS FASTING LABS 1 tablet 0     atorvastatin (LIPITOR) 20 MG tablet TAKE ONE TABLET BY MOUTH EVERY DAY 30 tablet 0     fluocinonide (LIDEX) 0.05 % cream Apply thin layer twice daily to spots on left hand until resolved or up to two weeks. 30 g 0     FLUoxetine (PROZAC) 40 MG capsule TAKE ONE CAPSULE BY MOUTH EVERY DAY 30 capsule 0     hydrochlorothiazide (HYDRODIURIL) 25 MG tablet Take 1 tablet (25 mg) by mouth daily 90 tablet 1     Lansoprazole (PREVACID PO) Take 40 mg by mouth       prochlorperazine (COMPAZINE) 10 MG tablet TAKE ONE TABLET BY MOUTH EVERY 6 HOURS AS NEEDED (NAUSEA/VOMITING) 30 tablet 3     TAMSULOSIN HCL PO Take 1 mg by mouth       oxyCODONE-acetaminophen (PERCOCET) 5-325 MG tablet Take 1-2 tablets by mouth every 6 hours as needed for moderate to severe pain (#15 to last 30 days) (Patient not taking: Reported on 1/22/2019) 15 tablet 0       Allergies   Allergen Reactions     No Known Drug Allergies        Review of Systems:  -Constitutional:  Feeling well, in usual state of health.  -Skin:  As per HPI, no additional concerns.    Physical exam:  Vitals: There were no vitals taken for this visit.  GEN: This is a well developed, well-nourished female in no acute distress, in a pleasant mood.    SKIN: Sun-exposed skin, which includes the head/face, neck, both arms, digits, abdomen, and/or nails was examined.   - White type II skin.  - Subtle, slightly pink circular plaque around 6mm in size on the chin, linear excoriation through it  - There are several remaining very thin gritty pink macules on the face.   - No other lesions of concern on areas examined.       Impression/Plan:    1. Eczematous dermatitis. Likely secondarily impetiginized due to scratching in the past. No sign of infection after tx with mupirocin ointment. Will stop mupirocin, and start hydrocortisone 2.5% BID until next PDT visit. At that time, will have nursing staff  re-exam and determine if Dr. Mcintosh needs to see prior to proceeding with PDT. Given that only one small excoriation remaining, I do think we can go through with PDT even if this is not totally healed.     Hold mupirocin 2% ointment.     Prescribed hydrocortisone 2.5% ointment BID until resolved. Will have doctor reassess on Monday if still needing to use.    Patient can stop the medication if the itchiness and redness stops.     Follow-up following next appt of PDT on Monday, one month for recheck of AKs after PDT.        Staff Involved:  Scribe/Staff    Scribe Disclosure  I, Harleen Mccall, am serving as a scribe to document services personally performed by Dr. Yecenia Castle MD, based on data collection and the provider's statements to me.     Provider Disclosure:   The documentation recorded by the scribe accurately reflects the services I personally performed and the decisions made by me.    Yecenia Castle MD    Department of Dermatology  [facility]: Phone: [phone], Fax:[phone]  [facility]: Phone: [phone], Fax: [phone]                                ain, thank you for allowing me to participate in the care of your patient.        Sincerely,        Yecenia Castle MD

## 2019-01-28 ENCOUNTER — ALLIED HEALTH/NURSE VISIT (OUTPATIENT)
Dept: NURSING | Facility: CLINIC | Age: 69
End: 2019-01-28
Payer: COMMERCIAL

## 2019-01-28 DIAGNOSIS — L57.0 AK (ACTINIC KERATOSIS): ICD-10-CM

## 2019-01-28 PROCEDURE — 96567 PDT DSTR PRMLG LES SKN: CPT

## 2019-01-28 ASSESSMENT — PAIN SCALES - GENERAL: PAINLEVEL: NO PAIN (0)

## 2019-01-28 NOTE — NURSING NOTE
Michaela Dorman comes into clinic today at the request of Dr. Castle Ordering Provider for PDT.    This service provided today was under the supervising provider of the day Dr. Mcintosh, who was available if needed.    Gris Monsalve RN      Photodynamic Therapy Intake:    If patient has a hx of HSV or VZV(shingles in treatment area) they have been given prophylaxis:  No      1.Close monitoring for more significant reaction, and avoid saran wrap if YES to any of the following:  New medications since seen by physician? No (If yes, contact supervising physician)  NSAIDs, ibuprofen, aspirin, naproxen, piroxicam: No  Antiarrhythmics (amiodarone, quinidine): No  Hydrochlorothiazide:  Yes    2.Will hold PDT for YES to any of the following:  Pregnancy/breastfeeding/unknown pregnancy: No  Sun burn: No  Tetracyclines such as doxycycline: No  Ciprofloxacin: No   Methotrexate: No    3.Will hold LEVULAN for YES to any of the following:   Treatment today is for acne:N/A     The sites to be treated were verified and discussed with patient, sites verified were face .   Expected symptoms and/or complications of redness, peeling, stinging, and burning were reviewed.  Comfort measures including moisturizer, cool compresses, and sun protection measures were also reviewed with patient.  After review, patient verbalized understanding of procedure and consent form signed by patient(as per MD documentation) and patient agrees to proceed with treatment.     Applied 1 Levulan stick to the site/s.  No saran wrap applied.    MEDICATION: Levulan  DOSE: 1.5 mL  ROUTE: Topical  : [employer]  LOCATION GIVEN: face, avoiding chin  LOT NO: [id number]  EXP. DATE: 10/20  NDC: 50565-175-93    TREATMENT NUMBER(30 maximum treatments per site): 2      Photodynamic Therapy(PDT) Post Op Note    Patient successfully completed PDT treatment today. Patient tolerated treatment without complication.  Sites were cleansed with mild soap  and water and SPF was applied after treatment.     Sun protection was reviewed with patient.    After care instructions were reviewed with patient. AVS printed with clinic contact information and given to patient. Patient verbalized understanding and had no further questions or concerns at this time. Patient left clinic in good condition.

## 2019-01-28 NOTE — PATIENT INSTRUCTIONS
Photodynamic Therapy (PDT) Home Care Instructions  I will experience redness, swelling, pain and discomfort which will last 5-10 days. I may experience pinkness or redness that persists for 2-3 weeks but longer duration is possible in some individuals. Risks are infection, eye injury, blistering, reactivation of the cold sore virus, skin lightening, skin darkening or scarring. Multiple treatments may be required.   DAY OF TREATMENT  1) Wash treated area with mild cleanser.  Redness of the treated skin is expected and can vary significantly from person to person and treatment to treatment.  2) Apply SPF 50 before leaving your home/office and while driving to and from work.  3) Remain indoors if possible and avoid direct as well as indirect sunlight.  If your head or face were treated, wear a broad brimmed hat wile going to and from your car.  If your hands/arms were treated, were long sleeves and gloves.  4) Ice packs every 1-2 hours may be helpful as well.  5) In the evening, cleanse treated area gently.  Your skin will feel dry; keep treated area moisturized with a moisturizer.  6) If you have a history of cold sores, take (1) Valtrex 500mg tablet before bedtime, which will be prescribed by your physician.  DAY TWO  1) If you have a history of cold sores, take (1) Valtrex 500 mg tablets in the morning and in the evening on days 2 and 3.  2) You may take a shower.  Men should NOT shave if their face was treated.  3) Cleanse treated area gently.  4) Apply SPF 50  5) Most discomfort usually subsides by Day 3.  6) You should avoid direct sunlight and try to remain indoors on Day 2.  The sensitivity to sunlight will significantly decrease after 48 hours.  7) You should soak the treated areas with as soft washcloth with cold water for 20 minutes every 4-6 hours.  Ice may be applied after the cold-water soaks, if this feels good.  The area should be patted dry and moisturized following the cold-water soaks.  8) Follow  evening routine as you did on Day 1    DAYS 3- 7   1) Try to avoid direct sunlight and minimize incidental exposure for one week.  NO BEACHES!  Continue using SPF 50.  This is very important in protecting your newly rejuvenated skin.  2) You may begin applying make-up once any crusting has healed.  The area may be slightly red for a few weeks.  3) The skin will feel dry and tightened.  Moisturize once to twice daily.    Who should I call with questions?    [facility]: [phone]     [facility]: [phone]    For urgent needs outside of business hours call the [facility] at [phone] and ask for the dermatology resident on call

## 2019-02-03 DIAGNOSIS — Z51.11 ENCOUNTER FOR ANTINEOPLASTIC CHEMOTHERAPY: ICD-10-CM

## 2019-02-04 RX ORDER — PROCHLORPERAZINE MALEATE 10 MG
TABLET ORAL
Qty: 30 TABLET | Refills: 3 | Status: SHIPPED | OUTPATIENT
Start: 2019-02-04 | End: 2019-09-17

## 2019-02-04 NOTE — TELEPHONE ENCOUNTER
Compazine    Prescription approved per [facility] Refill Protocol.    Natasha Alonzo, RN, BSN

## 2019-02-05 DIAGNOSIS — I10 ESSENTIAL HYPERTENSION: ICD-10-CM

## 2019-02-05 DIAGNOSIS — F43.21 ADJUSTMENT DISORDER WITH DEPRESSED MOOD: ICD-10-CM

## 2019-02-05 DIAGNOSIS — E78.5 HYPERLIPIDEMIA LDL GOAL <130: ICD-10-CM

## 2019-02-05 RX ORDER — FLUOXETINE 40 MG/1
CAPSULE ORAL
Qty: 30 CAPSULE | Refills: 4 | Status: SHIPPED | OUTPATIENT
Start: 2019-02-05 | End: 2019-03-05

## 2019-02-05 RX ORDER — HYDROCHLOROTHIAZIDE 25 MG/1
TABLET ORAL
Qty: 90 TABLET | Refills: 1 | Status: SHIPPED | OUTPATIENT
Start: 2019-02-05 | End: 2019-09-09

## 2019-02-05 RX ORDER — ATORVASTATIN CALCIUM 20 MG/1
TABLET, FILM COATED ORAL
Qty: 90 TABLET | Refills: 1 | Status: SHIPPED | OUTPATIENT
Start: 2019-02-05 | End: 2019-04-04

## 2019-02-05 NOTE — TELEPHONE ENCOUNTER
"Requested Prescriptions   Pending Prescriptions Disp Refills     hydrochlorothiazide (HYDRODIURIL) 25 MG tablet [Pharmacy Med Name: HYDROCHLOROTHIAZIDE 25MG TABS] 90 tablet 1     Sig: TAKE ONE TABLET BY MOUTH EVERY DAY    Diuretics (Including Combos) Protocol Failed - 2/5/2019  8:37 AM       Failed - Normal serum creatinine on file in past 12 months    Recent Labs   Lab Test 11/15/18  0953   CR 1.17*          Passed - Blood pressure under 140/90 in past 12 months    BP Readings from Last 3 Encounters:   01/11/19 126/78   11/20/18 128/62   10/25/18 104/66          Passed - Recent (12 mo) or future (30 days) visit within the authorizing provider's specialty    Patient had office visit in the last 12 months or has a visit in the next 30 days with authorizing provider or within the authorizing provider's specialty.  See \"Patient Info\" tab in inbasket, or \"Choose Columns\" in Meds & Orders section of the refill encounter.         Passed - Medication is active on med list       Passed - Patient is age 18 or older       Passed - No active pregancy on record       Passed - Normal serum potassium on file in past 12 months    Recent Labs   Lab Test 11/15/18  0953   POTASSIUM 3.8          Passed - Normal serum sodium on file in past 12 months    Recent Labs   Lab Test 11/15/18  0953             Passed - No positive pregnancy test in past 12 months        atorvastatin (LIPITOR) 20 MG tablet [Pharmacy Med Name: ATORVASTATIN CALCIUM 20MG TABS] 90 tablet 0     Sig: TAKE ONE TABLET BY MOUTH ONCE DAILY --NEED FASTING LABS    Statins Protocol Passed - 2/5/2019  8:37 AM       Passed - LDL on file in past 12 months    Recent Labs   Lab Test 09/10/18  0828   *          Passed - No abnormal creatine kinase in past 12 months    No lab results found.        Passed - Recent (12 mo) or future (30 days) visit within the authorizing provider's specialty    Patient had office visit in the last 12 months or has a visit in the next 30 " "days with authorizing provider or within the authorizing provider's specialty.  See \"Patient Info\" tab in inbasket, or \"Choose Columns\" in Meds & Orders section of the refill encounter.         Passed - Medication is active on med list       Passed - Patient is age 18 or older       Passed - No active pregnancy on record       Passed - No positive pregnancy test in past 12 months        FLUoxetine (PROZAC) 40 MG capsule [Pharmacy Med Name: FLUOXETINE HCL 40MG CAPS] 30 capsule 0     Sig: TAKE ONE CAPSULE BY MOUTH ONCE DAILY    SSRIs Protocol Passed - 2/5/2019  8:37 AM       Passed - PHQ-9 score less than 5 in past 6 months    Please review last PHQ-9 score.   PHQ-9 score:    PHQ-9 SCORE 1/11/2019   PHQ-9 Total Score -   PHQ-9 Total Score MyChart 0   PHQ-9 Total Score 0          Passed - Medication is active on med list       Passed - Patient is age 18 or older       Passed - No active pregnancy on record       Passed - No positive pregnancy test in last 12 months       Passed - Recent (6 mo) or future (30 days) visit within the authorizing provider's specialty    Patient had office visit in the last 6 months or has a visit in the next 30 days with authorizing provider or within the authorizing provider's specialty.  See \"Patient Info\" tab in inbasket, or \"Choose Columns\" in Meds & Orders section of the refill encounter.          Last ov 01/11/2019  Atorvastatin 09/05/2018 #30 R0, hydrochlorothiazide 06/05/2018 6 months (should have been out dec).   Prescription approved per [facility] Refill Protocol. For fluoxetine,  Other two are break in medication and flagged for provider.      Larry Meadows, RN, BSN          "

## 2019-02-18 ENCOUNTER — TRANSFERRED RECORDS (OUTPATIENT)
Dept: HEALTH INFORMATION MANAGEMENT | Facility: CLINIC | Age: 69
End: 2019-02-18

## 2019-02-19 DIAGNOSIS — L57.0 AK (ACTINIC KERATOSIS): Primary | ICD-10-CM

## 2019-03-04 ENCOUNTER — ALLIED HEALTH/NURSE VISIT (OUTPATIENT)
Dept: NURSING | Facility: CLINIC | Age: 69
End: 2019-03-04
Payer: COMMERCIAL

## 2019-03-04 DIAGNOSIS — L57.0 AK (ACTINIC KERATOSIS): Primary | ICD-10-CM

## 2019-03-04 PROCEDURE — 96567 PDT DSTR PRMLG LES SKN: CPT

## 2019-03-04 NOTE — NURSING NOTE
Photodynamic Therapy Intake:    If patient has a hx of HSV or VZV(shingles in treatment area) they have been given prophylaxis:  No      1.Close monitoring for more significant reaction, and avoid saran wrap if YES to any of the following:  New medications since seen by physician? No (If yes, contact supervising physician)  NSAIDs, ibuprofen, aspirin, naproxen, piroxicam: No  Antiarrhythmics (amiodarone, quinidine): No  Hydrochlorothiazide:  Yes    2.Will hold PDT for YES to any of the following:  Pregnancy/breastfeeding/unknown pregnancy: No  Sun burn: No  Tetracyclines such as doxycycline: No  Ciprofloxacin: No   Methotrexate: No    3.Will hold LEVULAN for YES to any of the following:   Treatment today is for acne:No     The sites to be treated were verified and discussed with patient, sites verified were Face.   Expected symptoms and/or complications of redness, peeling, stinging, and burning were reviewed.  Comfort measures including moisturizer, cool compresses, and sun protection measures were also reviewed with patient.  After review, patient verbalized understanding of procedure and consent form signed by patient(as per MD documentation) and patient agrees to proceed with treatment.     Applied 1 Levulan stick to the site/s.  No saran wrap applied to face (hydrochlorothiazide)    MEDICATION: Levulan  DOSE: 1.5 mL  ROUTE: Topical  : [employer]  LOCATION GIVEN: Face  LOT NO: [id number]  EXP. DATE: 10/20  NDC: 11102-177-50    TREATMENT NUMBER(30 maximum treatments per site): 3      Photodynamic Therapy(PDT) Post Op Note    Patient successfully completed PDT treatment today. Patient tolerated treatment without complication.  Sites were cleansed with mild soap and water and SPF was applied after treatment.     Sun protection was reviewed with patient:  Patient brought personal hat    After care instructions were reviewed with patient. AVS printed with clinic contact information and given  to patient. Patient verbalized understanding and had no further questions or concerns at this time. Patient left clinic in good condition.      Michaela Dorman comes into clinic today at the request of Dr. Castle Ordering Provider for photodynamic therapy.    This service provided today was under the supervising provider of the day Dr. Mcintosh, who was available if needed.    Carina Mcarthur CMA

## 2019-03-04 NOTE — PATIENT INSTRUCTIONS
Photodynamic Therapy (PDT) Home Care Instructions  I will experience redness, swelling, pain and discomfort which will last 5-10 days. I may experience pinkness or redness that persists for 2-3 weeks but longer duration is possible in some individuals. Risks are infection, eye injury, blistering, reactivation of the cold sore virus, skin lightening, skin darkening or scarring. Multiple treatments may be required.   DAY OF TREATMENT  1) Wash treated area with mild cleanser.  Redness of the treated skin is expected and can vary significantly from person to person and treatment to treatment.  2) Apply SPF 50 before leaving your home/office and while driving to and from work.  3) Remain indoors if possible and avoid direct as well as indirect sunlight.  If your head or face were treated, wear a broad brimmed hat wile going to and from your car.  If your hands/arms were treated, were long sleeves and gloves.  4) Ice packs every 1-2 hours may be helpful as well.  5) In the evening, cleanse treated area gently.  Your skin will feel dry; keep treated area moisturized with a moisturizer.  6) If you have a history of cold sores, take (1) Valtrex 500mg tablet before bedtime, which will be prescribed by your physician.  DAY TWO  1) If you have a history of cold sores, take (1) Valtrex 500 mg tablets in the morning and in the evening on days 2 and 3.  2) You may take a shower.  Men should NOT shave if their face was treated.  3) Cleanse treated area gently.  4) Apply SPF 50  5) Most discomfort usually subsides by Day 3.  6) You should avoid direct sunlight and try to remain indoors on Day 2.  The sensitivity to sunlight will significantly decrease after 48 hours.  7) You should soak the treated areas with as soft washcloth with cold water for 20 minutes every 4-6 hours.  Ice may be applied after the cold-water soaks, if this feels good.  The area should be patted dry and moisturized following the cold-water soaks.  8) Follow  evening routine as you did on Day 1    DAYS 3- 7   1) Try to avoid direct sunlight and minimize incidental exposure for one week.  NO BEACHES!  Continue using SPF 50.  This is very important in protecting your newly rejuvenated skin.  2) You may begin applying make-up once any crusting has healed.  The area may be slightly red for a few weeks.  3) The skin will feel dry and tightened.  Moisturize once to twice daily.  Who should I call with questions?    [facility]: [phone]     [facility]: [phone]    For urgent needs outside of business hours call the [facility] at [phone] and ask for the dermatology resident on call

## 2019-03-05 ENCOUNTER — MYC MEDICAL ADVICE (OUTPATIENT)
Dept: FAMILY MEDICINE | Facility: OTHER | Age: 69
End: 2019-03-05

## 2019-03-05 ENCOUNTER — TELEPHONE (OUTPATIENT)
Dept: FAMILY MEDICINE | Facility: OTHER | Age: 69
End: 2019-03-05

## 2019-03-05 DIAGNOSIS — G89.4 CHRONIC PAIN SYNDROME: ICD-10-CM

## 2019-03-05 DIAGNOSIS — M51.369 DDD (DEGENERATIVE DISC DISEASE), LUMBAR: Primary | ICD-10-CM

## 2019-03-05 DIAGNOSIS — M51.369 DDD (DEGENERATIVE DISC DISEASE), LUMBAR: ICD-10-CM

## 2019-03-05 RX ORDER — ESCITALOPRAM OXALATE 10 MG/1
10 TABLET ORAL DAILY
Qty: 30 TABLET | Refills: 1 | Status: SHIPPED | OUTPATIENT
Start: 2019-03-05 | End: 2019-03-21

## 2019-03-05 RX ORDER — OXYCODONE AND ACETAMINOPHEN 5; 325 MG/1; MG/1
1-2 TABLET ORAL EVERY 6 HOURS PRN
Qty: 15 TABLET | Refills: 0 | Status: SHIPPED | OUTPATIENT
Start: 2019-03-05 | End: 2019-04-04

## 2019-03-05 RX ORDER — FLUOXETINE 10 MG/1
CAPSULE ORAL
Qty: 21 CAPSULE | Refills: 0 | Status: SHIPPED | OUTPATIENT
Start: 2019-03-05 | End: 2019-04-04

## 2019-03-05 NOTE — TELEPHONE ENCOUNTER
Patient starting escitalopram 10mg daily. Unable to split fluoxetine 40mg capsules to complete taper.    Entered order for your approval.    Jean Polanco [facility], [facility] [phone]

## 2019-03-18 ENCOUNTER — MYC MEDICAL ADVICE (OUTPATIENT)
Dept: FAMILY MEDICINE | Facility: OTHER | Age: 69
End: 2019-03-18

## 2019-03-18 NOTE — PROGRESS NOTES
SUBJECTIVE:   Michaela Dorman is a 68 year old female who presents to clinic today for the following health issues:      History of Present Illness     Depression & Anxiety Follow-up:     Depression/Anxiety:  Depression only    Status since last visit::  Improved    Other associated symptoms of depression::  YES    Significant life event::  No    Current substance use::  None    Anxiety/Panic symptoms::  No       Today's PHQ-9         PHQ-9 Total Score:     (P) 10   PHQ-9 Q9 Suicidal ideation:   (P) Not at all   Thoughts of suicide or self harm:      Self-harm Plan:        Self-harm Action:          Safety concerns for self or others:       AUSTIN-7 Total Score: (P) 4  Frequency of exercise:  6-7 days/week  Duration of exercise:  Greater than 60 minutes  Taking medications regularly:  Yes  Medication side effects:  Muscle aches and Lightheadedness  Additional concerns today:  Yes    She has a skin lesion on her chin that is prone to trauma and has not healed over the last 4-6 weeks.  Biopsy is discussed today.  We have talked about this in the distant past as well.  Concerns for basal cell carcinoma are discussed.  Problem list and histories reviewed & adjusted, as indicated.  Additional history: as documented    Patient Active Problem List   Diagnosis     Enthesopathy of hip region     Family history of stroke (cerebrovascular)     Trochanteric bursitis     Advanced directives, counseling/discussion     Health Care Home     Hypertension goal BP (blood pressure) < 140/90     Baker's cyst of knee     Patella-femoral syndrome     Adjustment disorder with depressed mood     Essential hypertension     Malignant neoplasm of upper-outer quadrant of left female breast (H)     Encounter for antineoplastic chemotherapy     S/P bilateral mastectomy     Anxiety     Hyperlipidemia LDL goal <130     S/P reverse total shoulder arthroplasty, right     Prophylactic antibiotic for dental procedure indicated due to prior joint  replacement     Chronic pain syndrome     Lumbar radiculopathy     Foraminal stenosis of lumbar region     Central stenosis of spinal canal     DDD (degenerative disc disease), lumbar     Past Surgical History:   Procedure Laterality Date     ARTHROPLASTY SHOULDER Right 2015     ARTHROSCOPY KNEE  2012    Procedure:ARTHROSCOPY KNEE; right knee arthroscopy with partial lateral menisectomy; Surgeon:DAMIÁN MCALLISTER; [facility]     C LIGATE FALLOPIAN TUBE       C NONSPECIFIC PROCEDURE      ankle fracture surgery     COLONOSCOPY N/A 2017    Procedure: COLONOSCOPY;  colonoscopy;  Surgeon: Chuck Chand MD;  [facility]     EXCISE MASS AXILLA Left 2016    Procedure: EXCISE MASS AXILLA;  Surgeon: Keyon Blanco MD;  [facility]     HC REMV CATARACT EXTRACAP,INSERT LENS  2009    Right eye     INSERT PORT VASCULAR ACCESS Right 10/7/2016    Procedure: INSERT PORT VASCULAR ACCESS;  Surgeon: Keyon Blanco MD;  [facility]     MASTECTOMY SIMPLE BILATERAL Bilateral 2017    Procedure: MASTECTOMY SIMPLE BILATERAL;  Surgeon: Keyon Blanco MD;  [facility]     OPEN REDUCTION INTERNAL FIXATION FOOT Right 3/20/2015    Procedure: OPEN REDUCTION INTERNAL FIXATION FOOT;  Surgeon: Javi Herrmann DPM;  [facility]     REMOVE PORT VASCULAR ACCESS Right 2018    Procedure: REMOVE PORT VASCULAR ACCESS;  right intra jugular port removal;  Surgeon: Keyon Blanco MD;  [facility]     SHOULDER SURGERY Right 2015       Social History     Tobacco Use     Smoking status: Former Smoker     Packs/day: 3.00     Years: 10.00     Pack years: 30.00     Types: Cigarettes     Last attempt to quit: 1979     Years since quittin.3     Smokeless tobacco: Never Used     Tobacco comment: approx. 3 packs daily   Substance Use Topics     Alcohol use: Yes     Alcohol/week: 0.0 oz     Comment: daily glass of wine     Family History   Problem Relation Age of Onset      Hypertension Mother      Thyroid Disease Mother      Cerebrovascular Disease Mother      Hypertension Father      Cerebrovascular Disease Father      Cancer Father         skin     Thyroid Disease Sister      Diabetes Brother         type 2     Depression Sister      Breast Cancer Sister         age 47     Cancer Sister         skin     Cancer Brother         inside nose         Current Outpatient Medications   Medication Sig Dispense Refill     acetaminophen (TYLENOL EX ST ARTHRITIS PAIN) 500 MG tablet Take 500-1,000 mg by mouth every 6 hours as needed for mild pain       atorvastatin (LIPITOR) 20 MG tablet TAKE ONE TABLET BY MOUTH EVERY DAY 30 tablet 0     escitalopram (LEXAPRO) 10 MG tablet Take 1 tablet (10 mg) by mouth daily 90 tablet 1     hydrochlorothiazide (HYDRODIURIL) 25 MG tablet TAKE ONE TABLET BY MOUTH EVERY DAY 90 tablet 1     Lansoprazole (PREVACID PO) Take 40 mg by mouth       oxyCODONE-acetaminophen (PERCOCET) 5-325 MG tablet Take 1-2 tablets by mouth every 6 hours as needed for moderate to severe pain (#15 to last 30 days) 15 tablet 0     prochlorperazine (COMPAZINE) 10 MG tablet TAKE ONE TABLET BY MOUTH EVERY 6 HOURS AS NEEDED (NAUSEA/VOMITING) 30 tablet 3     TAMSULOSIN HCL PO Take 1 mg by mouth       atorvastatin (LIPITOR) 20 MG tablet TAKE ONE TABLET BY MOUTH ONCE DAILY --NEED FASTING LABS (Patient not taking: Reported on 3/21/2019) 90 tablet 1     FLUoxetine (PROZAC) 10 MG capsule Take  2 capsules daily for 1 week then 1 daily for 1 week then stop. Start taper when starting escitalopram. (Patient not taking: Reported on 3/21/2019) 21 capsule 0     hydrocortisone 2.5 % ointment Apply twice daily to chin as needed for itch/rash. Use for up to two weeks. (Patient not taking: Reported on 3/21/2019) 20 g 0     Allergies   Allergen Reactions     No Known Drug Allergies      BP Readings from Last 3 Encounters:   03/21/19 148/71   01/11/19 126/78   11/20/18 128/62    Wt Readings from Last 3  "Encounters:   03/21/19 62.5 kg (137 lb 12.8 oz)   01/11/19 62.1 kg (136 lb 12.8 oz)   11/20/18 62.2 kg (137 lb 3.2 oz)                    ROS:  CONSTITUTIONAL: NEGATIVE for fever, chills, change in weight  INTEGUMENTARY/SKIN: POSITIVE for lesion on her chin that is not healing and prone to trauma.  Is also pruritic in nature.  It is fairly flat and does not have any pearlescent type of presentation.  ENT/MOUTH: NEGATIVE for ear, mouth and throat problems  RESP: NEGATIVE for significant cough or SOB  CV: NEGATIVE for chest pain, palpitations or peripheral edema  MUSCULOSKELETAL: NEGATIVE for significant arthralgias or myalgia  NEURO: NEGATIVE for weakness, dizziness or paresthesias  PSYCHIATRIC: NEGATIVE for changes in mood or affect    OBJECTIVE:     /71 (Cuff Size: Adult Regular)   Pulse 98   Temp 98  F (36.7  C) (Temporal)   Resp 16   Ht 1.676 m (5' 6\")   Wt 62.5 kg (137 lb 12.8 oz)   BMI 22.24 kg/m    Body mass index is 22.24 kg/m .  GENERAL: healthy, alert and no distress  HENT: ear canals and TM's normal, nose and mouth without ulcers or lesions  NECK: no adenopathy, no asymmetry, masses, or scars and thyroid normal to palpation  RESP: lungs clear to auscultation - no rales, rhonchi or wheezes  CV: regular rate and rhythm, normal S1 S2, no S3 or S4, no murmur, click or rub, no peripheral edema and peripheral pulses strong  MS: no gross musculoskeletal defects noted, no edema  SKIN: Concerns for possible basal carcinoma to the chin.  NEURO: Normal strength and tone, mentation intact and speech normal  PSYCH: mentation appears normal, affect normal/bright     Procedure: Procedure:  Informed consent is obtained and risk factors discussed to the patients satisfaction.  I answered questions the patient had.  The area of concern is/are cleansed with betadine 3x's.  The base of the lesion is infiltrated with 1% lidocaine local with good effect.  The lesion is removed with sharp shave type dissection and " placed in pathology containers which have been labeled appropriately.  Cautery is used to obtain hemostasis.  Surgical sites are cleansed and dressed in the usual fashion.  Patient tolerated the procedure well.      ASSESSMENT/PLAN:     1. Facial lesion  As above.  Follow-up in 4-6 weeks.  - Surgical pathology exam    2. Chronic pain syndrome  3. DDD (degenerative disc disease), lumbar  4. Adjustment disorder with depressed mood  Refilled medication.  Follow-up in 6 months.  Lesion removed  - escitalopram (LEXAPRO) 10 MG tablet; Take 1 tablet (10 mg) by mouth daily  Dispense: 90 tablet; Refill: 1      Phani Jason PA-C  [facility]  Answers for HPI/ROS submitted by the patient on 3/21/2019   Chronic problems general questions HPI Form  If you checked off any problems, how difficult have these problems made it for you to do your work, take care of things at home, or get along with other people?: Somewhat difficult  PHQ9 TOTAL SCORE: 10  AUSTIN 7 TOTAL SCORE: 4    Answers for HPI/ROS submitted by the patient on 3/21/2019   Chronic problems general questions HPI Form  If you checked off any problems, how difficult have these problems made it for you to do your work, take care of things at home, or get along with other people?: Somewhat difficult  PHQ9 TOTAL SCORE: 10  AUSTIN 7 TOTAL SCORE: 4

## 2019-03-21 ENCOUNTER — MYC MEDICAL ADVICE (OUTPATIENT)
Dept: FAMILY MEDICINE | Facility: OTHER | Age: 69
End: 2019-03-21

## 2019-03-21 ENCOUNTER — OFFICE VISIT (OUTPATIENT)
Dept: FAMILY MEDICINE | Facility: OTHER | Age: 69
End: 2019-03-21
Payer: COMMERCIAL

## 2019-03-21 VITALS
SYSTOLIC BLOOD PRESSURE: 148 MMHG | DIASTOLIC BLOOD PRESSURE: 71 MMHG | WEIGHT: 137.8 LBS | RESPIRATION RATE: 16 BRPM | TEMPERATURE: 98 F | BODY MASS INDEX: 22.14 KG/M2 | HEART RATE: 98 BPM | HEIGHT: 66 IN

## 2019-03-21 DIAGNOSIS — M51.369 DDD (DEGENERATIVE DISC DISEASE), LUMBAR: ICD-10-CM

## 2019-03-21 DIAGNOSIS — G89.4 CHRONIC PAIN SYNDROME: ICD-10-CM

## 2019-03-21 DIAGNOSIS — Z91.89 RISK OF EXPOSURE TO LYME DISEASE: ICD-10-CM

## 2019-03-21 DIAGNOSIS — R52 GENERALIZED BODY ACHES: ICD-10-CM

## 2019-03-21 DIAGNOSIS — E78.5 HYPERLIPIDEMIA LDL GOAL <130: ICD-10-CM

## 2019-03-21 DIAGNOSIS — L98.9 FACIAL LESION: Primary | ICD-10-CM

## 2019-03-21 DIAGNOSIS — F43.21 ADJUSTMENT DISORDER WITH DEPRESSED MOOD: ICD-10-CM

## 2019-03-21 DIAGNOSIS — I10 HYPERTENSION GOAL BP (BLOOD PRESSURE) < 140/90: Primary | ICD-10-CM

## 2019-03-21 PROCEDURE — 99213 OFFICE O/P EST LOW 20 MIN: CPT | Mod: 25 | Performed by: PHYSICIAN ASSISTANT

## 2019-03-21 PROCEDURE — 88305 TISSUE EXAM BY PATHOLOGIST: CPT | Performed by: PHYSICIAN ASSISTANT

## 2019-03-21 PROCEDURE — 11102 TANGNTL BX SKIN SINGLE LES: CPT | Performed by: PHYSICIAN ASSISTANT

## 2019-03-21 RX ORDER — ESCITALOPRAM OXALATE 10 MG/1
10 TABLET ORAL DAILY
Qty: 90 TABLET | Refills: 1 | Status: SHIPPED | OUTPATIENT
Start: 2019-03-21 | End: 2019-05-08

## 2019-03-21 ASSESSMENT — MIFFLIN-ST. JEOR: SCORE: 1166.81

## 2019-03-21 ASSESSMENT — ANXIETY QUESTIONNAIRES
GAD7 TOTAL SCORE: 4
5. BEING SO RESTLESS THAT IT IS HARD TO SIT STILL: NOT AT ALL
6. BECOMING EASILY ANNOYED OR IRRITABLE: NOT AT ALL
GAD7 TOTAL SCORE: 4
1. FEELING NERVOUS, ANXIOUS, OR ON EDGE: SEVERAL DAYS
7. FEELING AFRAID AS IF SOMETHING AWFUL MIGHT HAPPEN: SEVERAL DAYS
3. WORRYING TOO MUCH ABOUT DIFFERENT THINGS: SEVERAL DAYS
7. FEELING AFRAID AS IF SOMETHING AWFUL MIGHT HAPPEN: SEVERAL DAYS
4. TROUBLE RELAXING: NOT AT ALL
2. NOT BEING ABLE TO STOP OR CONTROL WORRYING: SEVERAL DAYS
GAD7 TOTAL SCORE: 4

## 2019-03-21 ASSESSMENT — PATIENT HEALTH QUESTIONNAIRE - PHQ9
SUM OF ALL RESPONSES TO PHQ QUESTIONS 1-9: 10
10. IF YOU CHECKED OFF ANY PROBLEMS, HOW DIFFICULT HAVE THESE PROBLEMS MADE IT FOR YOU TO DO YOUR WORK, TAKE CARE OF THINGS AT HOME, OR GET ALONG WITH OTHER PEOPLE: SOMEWHAT DIFFICULT
SUM OF ALL RESPONSES TO PHQ QUESTIONS 1-9: 10

## 2019-03-21 ASSESSMENT — PAIN SCALES - GENERAL: PAINLEVEL: NO PAIN (0)

## 2019-03-22 ASSESSMENT — ANXIETY QUESTIONNAIRES: GAD7 TOTAL SCORE: 4

## 2019-03-22 ASSESSMENT — PATIENT HEALTH QUESTIONNAIRE - PHQ9: SUM OF ALL RESPONSES TO PHQ QUESTIONS 1-9: 10

## 2019-03-23 ENCOUNTER — MYC MEDICAL ADVICE (OUTPATIENT)
Dept: NEUROSURGERY | Facility: CLINIC | Age: 69
End: 2019-03-23

## 2019-03-23 DIAGNOSIS — M54.16 LUMBAR RADICULOPATHY: Primary | ICD-10-CM

## 2019-03-25 ENCOUNTER — MYC MEDICAL ADVICE (OUTPATIENT)
Dept: FAMILY MEDICINE | Facility: OTHER | Age: 69
End: 2019-03-25

## 2019-03-25 DIAGNOSIS — R52 GENERALIZED BODY ACHES: ICD-10-CM

## 2019-03-25 DIAGNOSIS — Z91.89 RISK OF EXPOSURE TO LYME DISEASE: ICD-10-CM

## 2019-03-25 DIAGNOSIS — I10 HYPERTENSION GOAL BP (BLOOD PRESSURE) < 140/90: ICD-10-CM

## 2019-03-25 DIAGNOSIS — E78.5 HYPERLIPIDEMIA LDL GOAL <130: ICD-10-CM

## 2019-03-25 LAB
ALBUMIN SERPL-MCNC: 4.4 G/DL (ref 3.4–5)
ALP SERPL-CCNC: 58 U/L (ref 40–150)
ALT SERPL W P-5'-P-CCNC: 37 U/L (ref 0–50)
ANION GAP SERPL CALCULATED.3IONS-SCNC: 7 MMOL/L (ref 3–14)
AST SERPL W P-5'-P-CCNC: 33 U/L (ref 0–45)
BILIRUB SERPL-MCNC: 0.5 MG/DL (ref 0.2–1.3)
BUN SERPL-MCNC: 24 MG/DL (ref 7–30)
CALCIUM SERPL-MCNC: 9.7 MG/DL (ref 8.5–10.1)
CHLORIDE SERPL-SCNC: 103 MMOL/L (ref 94–109)
CHOLEST SERPL-MCNC: 202 MG/DL
CO2 SERPL-SCNC: 28 MMOL/L (ref 20–32)
CREAT SERPL-MCNC: 1.07 MG/DL (ref 0.52–1.04)
GFR SERPL CREATININE-BSD FRML MDRD: 53 ML/MIN/{1.73_M2}
GLUCOSE SERPL-MCNC: 100 MG/DL (ref 70–99)
HDLC SERPL-MCNC: 101 MG/DL
LDLC SERPL CALC-MCNC: 87 MG/DL
NONHDLC SERPL-MCNC: 101 MG/DL
POTASSIUM SERPL-SCNC: 4.4 MMOL/L (ref 3.4–5.3)
PROT SERPL-MCNC: 7.6 G/DL (ref 6.8–8.8)
SODIUM SERPL-SCNC: 138 MMOL/L (ref 133–144)
TRIGL SERPL-MCNC: 70 MG/DL

## 2019-03-25 PROCEDURE — 36415 COLL VENOUS BLD VENIPUNCTURE: CPT | Performed by: PHYSICIAN ASSISTANT

## 2019-03-25 PROCEDURE — 86618 LYME DISEASE ANTIBODY: CPT | Performed by: PHYSICIAN ASSISTANT

## 2019-03-25 PROCEDURE — 86666 EHRLICHIA ANTIBODY: CPT | Mod: 90 | Performed by: PHYSICIAN ASSISTANT

## 2019-03-25 PROCEDURE — 99000 SPECIMEN HANDLING OFFICE-LAB: CPT | Performed by: PHYSICIAN ASSISTANT

## 2019-03-25 PROCEDURE — 80053 COMPREHEN METABOLIC PANEL: CPT | Performed by: PHYSICIAN ASSISTANT

## 2019-03-25 PROCEDURE — 80061 LIPID PANEL: CPT | Performed by: PHYSICIAN ASSISTANT

## 2019-03-25 PROCEDURE — 86757 RICKETTSIA ANTIBODY: CPT | Mod: 90 | Performed by: PHYSICIAN ASSISTANT

## 2019-03-26 LAB
B BURGDOR IGG+IGM SER QL: 0.02 (ref 0–0.89)
COPATH REPORT: NORMAL

## 2019-03-27 ENCOUNTER — MYC MEDICAL ADVICE (OUTPATIENT)
Dept: FAMILY MEDICINE | Facility: OTHER | Age: 69
End: 2019-03-27

## 2019-03-28 ENCOUNTER — MYC MEDICAL ADVICE (OUTPATIENT)
Dept: FAMILY MEDICINE | Facility: OTHER | Age: 69
End: 2019-03-28

## 2019-03-28 LAB
R RICKETTSI IGG TITR SER IF: NORMAL {TITER}
R RICKETTSI IGM TITR SER IF: NORMAL {TITER}

## 2019-03-28 NOTE — TELEPHONE ENCOUNTER
Called patient informed of result and that provider did release labs to [facility].   Rocio Velásquez CMA ([facility])

## 2019-03-30 LAB
A PHAGOCYTOPH IGG TITR SER IF: NORMAL {TITER}
A PHAGOCYTOPH IGM TITR SER IF: NORMAL {TITER}

## 2019-04-03 ENCOUNTER — MYC MEDICAL ADVICE (OUTPATIENT)
Dept: FAMILY MEDICINE | Facility: OTHER | Age: 69
End: 2019-04-03

## 2019-04-03 ENCOUNTER — TELEPHONE (OUTPATIENT)
Dept: SURGERY | Facility: CLINIC | Age: 69
End: 2019-04-03

## 2019-04-03 DIAGNOSIS — G89.4 CHRONIC PAIN SYNDROME: ICD-10-CM

## 2019-04-03 DIAGNOSIS — M51.369 DDD (DEGENERATIVE DISC DISEASE), LUMBAR: ICD-10-CM

## 2019-04-03 DIAGNOSIS — M54.16 LUMBAR RADICULOPATHY: Primary | ICD-10-CM

## 2019-04-03 NOTE — PROGRESS NOTES
[facility]  [address] [id number] [phone]  Dept: [phone]    PRE-OP EVALUATION:  Today's date: 2019    Michaela Dorman (: 1950) presents for pre-operative evaluation assessment as requested by Dr. Salvador.  She requires evaluation and anesthesia risk assessment prior to undergoing surgery/procedure for treatment of low back radicular pain .    Fax number for surgical facility:   Primary Physician: Phani Tapia  Type of Anesthesia Anticipated: Local with MAC    Patient has a Health Care Directive or Living Will:  YES     Preop Questions 2019   Who is doing your surgery? i dont know   What are you having done? back injection   Date of Surgery/Procedure:    Facility or Hospital where procedure/surgery will be performed: [facility]   1.  Do you have a history of Heart attack, stroke, stent, coronary bypass surgery, or other heart surgery? No   2.  Do you ever have any pain or discomfort in your chest? No   3.  Do you have a history of  Heart Failure? No   4.   Are you troubled by shortness of breath when:  walking on a level surface, or up a slight hill, or at night? No   5.  Do you currently have a cold, bronchitis or other respiratory infection? No   6.  Do you have a cough, shortness of breath, or wheezing? No   7.  Do you sometimes get pains in the calves of your legs when you walk? No   8. Do you or anyone in your family have previous history of blood clots? No   9.  Do you or does anyone in your family have a serious bleeding problem such as prolonged bleeding following surgeries or cuts? No   10. Have you ever had problems with anemia or been told to take iron pills? No   11. Have you had any abnormal blood loss such as black, tarry or bloody stools, or abnormal vaginal bleeding? No   12. Have you ever had a blood transfusion? No   13. Have you or any of your relatives ever had problems with anesthesia? No   14. Do you have  sleep apnea, excessive snoring or daytime drowsiness? No   15. Do you have any prosthetic heart valves? No   16. Do you have prosthetic joints? YES - right shoulder   17. Is there any chance that you may be pregnant? No         HPI:     HPI related to upcoming procedure: chronic low back pain      See problem list for active medical problems.  Problems all longstanding and stable, except as noted/documented.  See ROS for pertinent symptoms related to these conditions.                                                                                                                                                          .    MEDICAL HISTORY:     Patient Active Problem List    Diagnosis Date Noted     Foraminal stenosis of lumbar region 12/01/2017     Priority: Medium     Complete lumbar spine left at various degrees and to a lesser extent the right as well.       Central stenosis of spinal canal 12/01/2017     Priority: Medium     lumbar         DDD (degenerative disc disease), lumbar 12/01/2017     Priority: Medium     Chronic pain syndrome 11/30/2017     Priority: Medium     substancePatient is followed by Phani Jason PA-C for ongoing prescription of pain medication.  All refills should only be approved by this provider, or covering partner.    Medication(s): Oxycodone IR 5mg.   Maximum quantity per month: 20  Clinic visit frequency required: Q 3 months     Controlled substance agreement:  Encounter-Level CSA:     There are no encounter-level csa.        Pain Clinic evaluation in the past: Yes       Date/Location:      DIRE Total Score(s):  No flowsheet data found.    Last [facility] website verification:  none   [email or web address]             Lumbar radiculopathy 11/30/2017     Priority: Medium     S/P reverse total shoulder arthroplasty, right 06/05/2017     Priority: Medium     Prophylactic antibiotic for dental procedure indicated due to prior joint replacement 06/05/2017     Priority: Medium      Hyperlipidemia LDL goal <130 05/30/2017     Priority: Medium     Anxiety 03/29/2017     Priority: Medium     S/P bilateral mastectomy 02/08/2017     Priority: Medium     Encounter for antineoplastic chemotherapy 10/07/2016     Priority: Medium     Malignant neoplasm of upper-outer quadrant of left female breast (H) 10/06/2016     Priority: Medium     Adjustment disorder with depressed mood 11/11/2015     Priority: Medium     Essential hypertension 11/11/2015     Priority: Medium     Baker's cyst of knee 02/09/2015     Priority: Medium     Patella-femoral syndrome 02/09/2015     Priority: Medium     Hypertension goal BP (blood pressure) < 140/90 10/04/2011     Priority: Medium     Health Care Home 09/29/2011     Priority: Medium     x  DX V65.8 REPLACED WITH [id number] HEALTH CARE HOME (04/08/2013)       Advanced directives, counseling/discussion 08/22/2011     Priority: Medium     Advance Directive Problem List Overview:   Name Relationship Phone    Primary Health Care Agent            Alternative Health Care Agent          Patient states has Advance Directive and will bring in a copy to clinic. 8/22/2011          Trochanteric bursitis 01/06/2009     Priority: Medium     Family history of stroke (cerebrovascular) 03/07/2008     Priority: Medium     Enthesopathy of hip region 01/30/2006     Priority: Medium      Past Medical History:   Diagnosis Date     Central stenosis of spinal canal 12/1/2017    lumbar      DDD (degenerative disc disease), lumbar 12/1/2017     Depressive disorder, not elsewhere classified      Esophageal reflux      ETOH abuse     in remission     Foraminal stenosis of lumbar region 12/1/2017    Complete lumbar spine left at various degrees and to a lesser extent the right as well.     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     Subdural hematoma (H)       Past Surgical History:   Procedure Laterality Date     ARTHROPLASTY SHOULDER Right 11/17/2015     ARTHROSCOPY KNEE  6/7/2012    Procedure:ARTHROSCOPY KNEE; right knee arthroscopy with partial lateral menisectomy; Surgeon:DAMIÁN MCALLISTER; [facility]     C LIGATE FALLOPIAN TUBE       C NONSPECIFIC PROCEDURE  1956    ankle fracture surgery     COLONOSCOPY N/A 12/22/2017    Procedure: COLONOSCOPY;  colonoscopy;  Surgeon: Chuck Chand MD;  [facility]     EXCISE MASS AXILLA Left 9/16/2016    Procedure: EXCISE MASS AXILLA;  Surgeon: Keyon Blanco MD;  [facility]     HC REMV CATARACT EXTRACAP,INSERT LENS  6/25/2009    Right eye     INSERT PORT VASCULAR ACCESS Right 10/7/2016    Procedure: INSERT PORT VASCULAR ACCESS;  Surgeon: Keyon Blanco MD;  [facility]     MASTECTOMY SIMPLE BILATERAL Bilateral 2/8/2017    Procedure: MASTECTOMY SIMPLE BILATERAL;  Surgeon: Keyon Blanco MD;  [facility]     OPEN REDUCTION INTERNAL FIXATION FOOT Right 3/20/2015    Procedure: OPEN REDUCTION INTERNAL FIXATION FOOT;  Surgeon: Javi Herrmann DPM;  [facility]     REMOVE PORT VASCULAR ACCESS Right 2/14/2018    Procedure: REMOVE PORT VASCULAR ACCESS;  right intra jugular port removal;  Surgeon: Keyon Blanco MD;  [facility]     SHOULDER SURGERY Right 11/2015     Current Outpatient Medications   Medication Sig Dispense Refill     acetaminophen (TYLENOL EX ST ARTHRITIS PAIN) 500 MG tablet Take 500-1,000 mg by mouth every 6 hours as needed for mild pain       atorvastatin (LIPITOR) 20 MG tablet TAKE ONE TABLET BY MOUTH ONCE DAILY --NEED FASTING LABS (Patient not taking: Reported on 3/21/2019) 90 tablet 1     atorvastatin (LIPITOR) 20 MG tablet TAKE ONE TABLET BY MOUTH EVERY DAY 30 tablet 0     escitalopram (LEXAPRO) 10 MG tablet Take 1 tablet (10 mg) by mouth daily 90 tablet 1     FLUoxetine (PROZAC) 10 MG capsule Take  2 capsules daily for 1 week then 1 daily for 1 week then stop.  Start taper when starting escitalopram. (Patient not taking: Reported on 3/21/2019) 21 capsule 0     hydrochlorothiazide (HYDRODIURIL) 25 MG tablet TAKE ONE TABLET BY MOUTH EVERY DAY 90 tablet 1     hydrocortisone 2.5 % ointment Apply twice daily to chin as needed for itch/rash. Use for up to two weeks. (Patient not taking: Reported on 3/21/2019) 20 g 0     Lansoprazole (PREVACID PO) Take 40 mg by mouth       oxyCODONE-acetaminophen (PERCOCET) 5-325 MG tablet Take 1-2 tablets by mouth every 6 hours as needed for moderate to severe pain (#15 to last 30 days) 15 tablet 0     prochlorperazine (COMPAZINE) 10 MG tablet TAKE ONE TABLET BY MOUTH EVERY 6 HOURS AS NEEDED (NAUSEA/VOMITING) 30 tablet 3     TAMSULOSIN HCL PO Take 1 mg by mouth       OTC products: None, except as noted above    Allergies   Allergen Reactions     No Known Drug Allergies       Latex Allergy: NO    Social History     Tobacco Use     Smoking status: Former Smoker     Packs/day: 3.00     Years: 10.00     Pack years: 30.00     Types: Cigarettes     Last attempt to quit: 1979     Years since quittin.3     Smokeless tobacco: Never Used     Tobacco comment: approx. 3 packs daily   Substance Use Topics     Alcohol use: Yes     Alcohol/week: 0.0 oz     Comment: daily glass of wine     History   Drug Use No       REVIEW OF SYSTEMS:   CONSTITUTIONAL: NEGATIVE for fever, chills, change in weight  INTEGUMENTARY/SKIN: NEGATIVE for worrisome rashes, moles or lesions  EYES: NEGATIVE for vision changes or irritation  ENT/MOUTH: NEGATIVE for ear, mouth and throat problems  RESP: NEGATIVE for significant cough or SOB  BREAST: NEGATIVE for masses, tenderness or discharge  CV: NEGATIVE for chest pain, palpitations or peripheral edema  GI: NEGATIVE for nausea, abdominal pain, heartburn, or change in bowel habits  : NEGATIVE for frequency, dysuria, or hematuria  MUSCULOSKELETAL: NEGATIVE for significant arthralgias or myalgia  NEURO: NEGATIVE for weakness,  dizziness or paresthesias  ENDOCRINE: NEGATIVE for temperature intolerance, skin/hair changes  HEME: NEGATIVE for bleeding problems  PSYCHIATRIC: NEGATIVE for changes in mood or affect    EXAM:   There were no vitals taken for this visit.    GENERAL APPEARANCE: healthy, alert and no distress     EYES: EOMI, PERRL     HENT: ear canals and TM's normal and nose and mouth without ulcers or lesions     NECK: no adenopathy, no asymmetry, masses, or scars and thyroid normal to palpation     RESP: lungs clear to auscultation - no rales, rhonchi or wheezes     CV: regular rates and rhythm, normal S1 S2, no S3 or S4 and no murmur, click or rub     ABDOMEN:  soft, nontender, no HSM or masses and bowel sounds normal     MS: extremities normal- no gross deformities noted, no evidence of inflammation in joints, FROM in all extremities.     SKIN: no suspicious lesions or rashes     NEURO: Normal strength and tone, sensory exam grossly normal, mentation intact and speech normal     PSYCH: mentation appears normal. and affect normal/bright     LYMPHATICS: No cervical adenopathy    DIAGNOSTICS:   EKG: appears normal, NSR, normal axis, normal intervals, no acute ST/T changes c/w ischemia, no LVH by voltage criteria, unchanged from previous tracings    Recent Labs   Lab Test 03/25/19  0851 11/15/18  0953 05/11/18  1012 02/12/18  0917  01/24/17  1016   HGB  --  12.8 12.7 12.1   < > 9.6*   PLT  --  268 252 262   < > 315   INR  --   --   --  0.88  --   --     141 138 138   < > 138   POTASSIUM 4.4 3.8 4.2 3.9   < > 3.9   CR 1.07* 1.17* 0.81 0.87   < > 0.92   A1C  --   --   --   --   --  5.0    < > = values in this interval not displayed.        IMPRESSION:   Reason for surgery/procedure: low back injection for pain relief  Diagnosis/reason for consult: anesthesia / surgical clearance.    The proposed surgical procedure is considered LOW risk.    REVISED CARDIAC RISK INDEX  The patient has the following serious cardiovascular risks  for perioperative complications such as (MI, PE, VFib and 3  AV Block):  No serious cardiac risks  INTERPRETATION: 1 risks: Class II (low risk - 0.9% complication rate)    The patient has the following additional risks for perioperative complications:  No identified additional risks  The ASCVD Risk score (Nadiraaldair GIRON Jr., et al., 2013) failed to calculate for the following reasons:    The valid HDL cholesterol range is 20 to 100 mg/dL      ICD-10-CM    1. Preop general physical exam Z01.818        RECOMMENDATIONS:     --Patient is to take all scheduled medications on the day of surgery EXCEPT for modifications listed below.    APPROVAL GIVEN to proceed with proposed procedure, without further diagnostic evaluation       Signed Electronically by: Phani Jason PA-C    Copy of this evaluation report is provided to requesting physician.    [facility] Preop Guidelines    Revised Cardiac Risk Index  Answers for HPI/ROS submitted by the patient on 4/8/2019   If you checked off any problems, how difficult have these problems made it for you to do your work, take care of things at home, or get along with other people?: Very difficult  PHQ9 TOTAL SCORE: 4  [place] 7 TOTAL SCORE: 6    Answers for HPI/ROS submitted by the patient on 4/8/2019   If you checked off any problems, how difficult have these problems made it for you to do your work, take care of things at home, or get along with other people?: Very difficult  PHQ9 TOTAL SCORE: 4  [place] 7 TOTAL SCORE: 6

## 2019-04-03 NOTE — TELEPHONE ENCOUNTER
Patient called to schedule a VIANNEY. Scheduled patient on 4/11/19 @1100 arrival @1000 with [place]. Patient will get a pre-op physical before procedure. Thank you

## 2019-04-04 ENCOUNTER — MYC MEDICAL ADVICE (OUTPATIENT)
Dept: FAMILY MEDICINE | Facility: OTHER | Age: 69
End: 2019-04-04

## 2019-04-04 RX ORDER — OXYCODONE AND ACETAMINOPHEN 5; 325 MG/1; MG/1
1-2 TABLET ORAL EVERY 6 HOURS PRN
Qty: 30 TABLET | Refills: 0 | Status: SHIPPED | OUTPATIENT
Start: 2019-04-04 | End: 2019-05-31

## 2019-04-08 ENCOUNTER — OFFICE VISIT (OUTPATIENT)
Dept: FAMILY MEDICINE | Facility: OTHER | Age: 69
End: 2019-04-08
Payer: COMMERCIAL

## 2019-04-08 VITALS
BODY MASS INDEX: 22.18 KG/M2 | RESPIRATION RATE: 16 BRPM | HEART RATE: 94 BPM | DIASTOLIC BLOOD PRESSURE: 60 MMHG | SYSTOLIC BLOOD PRESSURE: 90 MMHG | OXYGEN SATURATION: 97 % | WEIGHT: 137.4 LBS | TEMPERATURE: 98.3 F

## 2019-04-08 DIAGNOSIS — Z23 NEED FOR PROPHYLACTIC VACCINATION AND INOCULATION AGAINST INFLUENZA: ICD-10-CM

## 2019-04-08 DIAGNOSIS — Z01.818 PREOP GENERAL PHYSICAL EXAM: Primary | ICD-10-CM

## 2019-04-08 PROCEDURE — 93000 ELECTROCARDIOGRAM COMPLETE: CPT | Performed by: PHYSICIAN ASSISTANT

## 2019-04-08 PROCEDURE — 99214 OFFICE O/P EST MOD 30 MIN: CPT | Performed by: PHYSICIAN ASSISTANT

## 2019-04-08 ASSESSMENT — ANXIETY QUESTIONNAIRES
GAD7 TOTAL SCORE: 6
2. NOT BEING ABLE TO STOP OR CONTROL WORRYING: SEVERAL DAYS
7. FEELING AFRAID AS IF SOMETHING AWFUL MIGHT HAPPEN: SEVERAL DAYS
3. WORRYING TOO MUCH ABOUT DIFFERENT THINGS: SEVERAL DAYS
6. BECOMING EASILY ANNOYED OR IRRITABLE: SEVERAL DAYS
7. FEELING AFRAID AS IF SOMETHING AWFUL MIGHT HAPPEN: SEVERAL DAYS
5. BEING SO RESTLESS THAT IT IS HARD TO SIT STILL: SEVERAL DAYS
GAD7 TOTAL SCORE: 6
GAD7 TOTAL SCORE: 6
4. TROUBLE RELAXING: NOT AT ALL
1. FEELING NERVOUS, ANXIOUS, OR ON EDGE: SEVERAL DAYS

## 2019-04-08 ASSESSMENT — PAIN SCALES - GENERAL: PAINLEVEL: SEVERE PAIN (6)

## 2019-04-08 ASSESSMENT — PATIENT HEALTH QUESTIONNAIRE - PHQ9
SUM OF ALL RESPONSES TO PHQ QUESTIONS 1-9: 4
10. IF YOU CHECKED OFF ANY PROBLEMS, HOW DIFFICULT HAVE THESE PROBLEMS MADE IT FOR YOU TO DO YOUR WORK, TAKE CARE OF THINGS AT HOME, OR GET ALONG WITH OTHER PEOPLE: VERY DIFFICULT
SUM OF ALL RESPONSES TO PHQ QUESTIONS 1-9: 4

## 2019-04-09 ASSESSMENT — PATIENT HEALTH QUESTIONNAIRE - PHQ9: SUM OF ALL RESPONSES TO PHQ QUESTIONS 1-9: 4

## 2019-04-09 ASSESSMENT — ANXIETY QUESTIONNAIRES: GAD7 TOTAL SCORE: 6

## 2019-04-10 ENCOUNTER — ANESTHESIA EVENT (OUTPATIENT)
Dept: SURGERY | Facility: CLINIC | Age: 69
End: 2019-04-10
Payer: MEDICARE

## 2019-04-10 ASSESSMENT — LIFESTYLE VARIABLES: TOBACCO_USE: 1

## 2019-04-11 ENCOUNTER — HOSPITAL ENCOUNTER (OUTPATIENT)
Dept: GENERAL RADIOLOGY | Facility: CLINIC | Age: 69
End: 2019-04-11
Attending: ANESTHESIOLOGY | Admitting: ANESTHESIOLOGY
Payer: MEDICARE

## 2019-04-11 ENCOUNTER — ANESTHESIA (OUTPATIENT)
Dept: SURGERY | Facility: CLINIC | Age: 69
End: 2019-04-11
Payer: MEDICARE

## 2019-04-11 ENCOUNTER — HOSPITAL ENCOUNTER (OUTPATIENT)
Facility: CLINIC | Age: 69
Discharge: HOME | End: 2019-04-11
Attending: ANESTHESIOLOGY | Admitting: ANESTHESIOLOGY
Payer: MEDICARE

## 2019-04-11 VITALS
RESPIRATION RATE: 16 BRPM | HEART RATE: 75 BPM | OXYGEN SATURATION: 95 % | SYSTOLIC BLOOD PRESSURE: 144 MMHG | DIASTOLIC BLOOD PRESSURE: 87 MMHG | TEMPERATURE: 98.1 F

## 2019-04-11 PROCEDURE — 25000128 H RX IP 250 OP 636: Performed by: ANESTHESIOLOGY

## 2019-04-11 PROCEDURE — 25000125 ZZHC RX 250: Performed by: ANESTHESIOLOGY

## 2019-04-11 PROCEDURE — 62323 NJX INTERLAMINAR LMBR/SAC: CPT | Performed by: ANESTHESIOLOGY

## 2019-04-11 PROCEDURE — 37000008 ZZH ANESTHESIA TECHNICAL FEE, 1ST 30 MIN: Performed by: ANESTHESIOLOGY

## 2019-04-11 PROCEDURE — 40000277 XR SURGERY CARM FLUORO LESS THAN 5 MIN W STILLS: Mod: TC

## 2019-04-11 RX ORDER — NALOXONE HYDROCHLORIDE 0.4 MG/ML
.1-.4 INJECTION, SOLUTION INTRAMUSCULAR; INTRAVENOUS; SUBCUTANEOUS
Status: DISCONTINUED | OUTPATIENT
Start: 2019-04-11 | End: 2019-04-11 | Stop reason: HOSPADM

## 2019-04-11 RX ORDER — SODIUM CHLORIDE, SODIUM LACTATE, POTASSIUM CHLORIDE, CALCIUM CHLORIDE 600; 310; 30; 20 MG/100ML; MG/100ML; MG/100ML; MG/100ML
INJECTION, SOLUTION INTRAVENOUS CONTINUOUS
Status: DISCONTINUED | OUTPATIENT
Start: 2019-04-11 | End: 2019-04-11 | Stop reason: HOSPADM

## 2019-04-11 RX ORDER — IOPAMIDOL 612 MG/ML
INJECTION, SOLUTION INTRATHECAL PRN
Status: DISCONTINUED | OUTPATIENT
Start: 2019-04-11 | End: 2019-04-11 | Stop reason: HOSPADM

## 2019-04-11 RX ORDER — TRIAMCINOLONE ACETONIDE 40 MG/ML
INJECTION, SUSPENSION INTRA-ARTICULAR; INTRAMUSCULAR PRN
Status: DISCONTINUED | OUTPATIENT
Start: 2019-04-11 | End: 2019-04-11 | Stop reason: HOSPADM

## 2019-04-11 RX ORDER — ONDANSETRON 2 MG/ML
4 INJECTION INTRAMUSCULAR; INTRAVENOUS EVERY 30 MIN PRN
Status: DISCONTINUED | OUTPATIENT
Start: 2019-04-11 | End: 2019-04-11 | Stop reason: HOSPADM

## 2019-04-11 RX ORDER — ONDANSETRON 4 MG/1
4 TABLET, ORALLY DISINTEGRATING ORAL EVERY 30 MIN PRN
Status: DISCONTINUED | OUTPATIENT
Start: 2019-04-11 | End: 2019-04-11 | Stop reason: HOSPADM

## 2019-04-11 RX ORDER — LIDOCAINE HYDROCHLORIDE 10 MG/ML
INJECTION, SOLUTION INFILTRATION; PERINEURAL PRN
Status: DISCONTINUED | OUTPATIENT
Start: 2019-04-11 | End: 2019-04-11 | Stop reason: HOSPADM

## 2019-04-11 NOTE — OP NOTE
CHIEF COMPLAINT: Low back and buttock pain and bilateral leg pain  PROCEDURE: L4 5 interlaminar epidural steroid injection using fluoroscopic guidance with contrast dye.   PROCEDURE DETAILS: After written informed consent was obtained from the patient, the patient was escorted to the procedure room.  The patient was placed in the prone position.  A  time out  was conducted to verify patient identity, procedure to be performed, side, site, allergies and any special requirements.  The skin over the neck and upper back region were prepped and draped in normal sterile fashion. Fluoroscopy was used to identify the interspace in an AP view and the skin was anesthetized with 2 mL of 1% lidocaine with bicarbonate buffer.  A 20-gauge 3-1/2 inch Tuohy needle was advanced using the loss of resistance technique with preservative free normal saline with fluoroscopic guidance. After negative aspiration for CSF and blood, 1.5 cc of Omnipaque contrast dye was injected revealing the appropriate epidurogram without evidence of intrathecal or intravascular spread. Following this, a 5-mL solution of 40 mg of triamcinolone with 4 cc preservative-free normal saline was slowly injected.  There was spread from approximately L2 down to S1.  After injection of the medication, as the needle tip was withdrawn, it was flushed with local anesthetic.  The patient was monitored with blood pressure and pulse oximetry machines with the assistance of an RN throughout the procedure.  The patient was alert and responsive to questions throughout the procedure.   The patient tolerated the procedure well and was observed in the post-procedural area.  The patient was dismissed without apparent complications.     DIAGNOSIS:  1.  For levels of lumbar disc degeneration causing axial low back pain in conjunction with neurogenic spinal stenosis  PLAN:  1. Performed a L4-5 interlaminar epidural steroid injection.  I was going to do a transforaminal injection but  given that she has some new levels of disc degeneration and multiple levels of spinal stenosis I felt trying to do a higher volume epidural injection from the interlaminar approach would be more beneficial to her.  2. The patient was instructed to follow-up at the [place] [facility] if today's procedure is not helpful.  Oskar Salvador MD  Diplomate of the American Board of Anesthesiology, Pain Medicine

## 2019-04-11 NOTE — DISCHARGE INSTRUCTIONS
Home Care Instructions                Procedure:  Epidural Steroid Injection or Joint injection    Activity:    Rest today    Do not work today    Resume normal activity tomorrow    Pain:    You may experience soreness at the injection site for one or two days    You may use an ice pack for 20 minutes every 2 hours for the first 24 hours    You may use a heating pad after the first 24 hours    You may use Tylenol  (acetaminophen) every 4 hours or other pain medicines as directed by your physician    Safety  Sedation medicine, if given may remain active for many hours.    It is important for the next 24 hours that you do not:    Drive a car    Operate machines or power tools    Consume alcohol, including beer    Sign any important papers or legal documents    You may experience numbness radiating into your legs or arms, (depending on the procedure location)  This numbness may last several hours.  Until the numb sensation returns to normal please use caution in walking, climbing stairs, stepping out of your vehicle, etc.    Common side effects of steroids:  Not everyone will experience corticosteroid side effects. If side effects are experienced they will gradually subside in the 7-10 day period following an injection.    Most common side effects include:    Flushed face and/or chest    Feeling of warmth, particularly in face but could be overall feeling of warmth    Increased blood sugar in diabetic patients    Menstrual irregularities may occur.  If taking hormone based birth control an alternate method of birth control is recommended    Sleep disturbances and/or mood swings are possible    Leg cramps    Please contact us if you have:  Severe pain   Fever more than 101.5 degrees Fahrenheit  Signs of infection (redness, swelling or drainage)      If you have questions during normal business hours (8am-5pm Monday-Friday) contact the [facility] at [phone]. If you need help after hours, we recommend that  you go to a hospital emergency room or dial 911.

## 2019-04-11 NOTE — ANESTHESIA POSTPROCEDURE EVALUATION
Patient: Michaela Dorman    Procedure(s):  INJECT EPIDURAL LUMBAR TRANSLAMINAR    Diagnosis:Lumbar Radiculopathy  Diagnosis Additional Information: No value filed.    Anesthesia Type:  MAC    Note:  Anesthesia Post Evaluation    Patient location during evaluation: Phase 2 and Bedside  Patient participation: Able to fully participate in evaluation  Level of consciousness: awake  Pain management: adequate  Airway patency: patent  Cardiovascular status: acceptable and hemodynamically stable  Respiratory status: acceptable, room air and nonlabored ventilation  Hydration status: stable  PONV: none     Anesthetic complications: None    Comments: Patient was happy with the anesthesia care received and no anesthesia related complications were noted.  I will follow up with the patient again if it is needed.        Last vitals:  Vitals:    04/11/19 1019   BP: 142/73   Pulse: 74   Resp: 16   Temp: 98.1  F (36.7  C)   SpO2: 99%         Electronically Signed By: LISA Garcia CRNA  April 11, 2019  11:30 AM

## 2019-04-11 NOTE — ANESTHESIA PREPROCEDURE EVALUATION
Anesthesia Pre-Procedure Evaluation    Patient: Michaela Dorman   MRN: [id number] : 1950          Preoperative Diagnosis: Lumbar Radiculopathy    Procedure(s):  INJECT EPIDURAL LUMBAR TRANSLAMINAR    Past Medical History:   Diagnosis Date     Central stenosis of spinal canal 2017    lumbar      DDD (degenerative disc disease), lumbar 2017     Depressive disorder, not elsewhere classified      Esophageal reflux      ETOH abuse     in remission     Foraminal stenosis of lumbar region 2017    Complete lumbar spine left at various degrees and to a lesser extent the right as well.     Lumbar radiculopathy 2017     Prophylactic antibiotic for dental procedure indicated due to prior joint replacement 2017     S/P reverse total shoulder arthroplasty, right 2017     Subdural hematoma (H)      Past Surgical History:   Procedure Laterality Date     ARTHROPLASTY SHOULDER Right 2015     ARTHROSCOPY KNEE  2012    Procedure:ARTHROSCOPY KNEE; right knee arthroscopy with partial lateral menisectomy; Surgeon:DAMIÁN MCALLISTER; Location:[facility]     C LIGATE FALLOPIAN TUBE       C NONSPECIFIC PROCEDURE      ankle fracture surgery     COLONOSCOPY N/A 2017    Procedure: COLONOSCOPY;  colonoscopy;  Surgeon: Chuck Chand MD;  Location: [facility]     EXCISE MASS AXILLA Left 2016    Procedure: EXCISE MASS AXILLA;  Surgeon: Keyon Blanco MD;  Location: [facility]     HC REMV CATARACT EXTRACAP,INSERT LENS  2009    Right eye     INSERT PORT VASCULAR ACCESS Right 10/7/2016    Procedure: INSERT PORT VASCULAR ACCESS;  Surgeon: Keyon Blanco MD;  Location: [facility]     MASTECTOMY SIMPLE BILATERAL Bilateral 2017    Procedure: MASTECTOMY SIMPLE BILATERAL;  Surgeon: Keyon Blanco MD;  Location: [facility]     OPEN REDUCTION INTERNAL FIXATION FOOT Right 3/20/2015    Procedure: OPEN REDUCTION INTERNAL FIXATION FOOT;  Surgeon: Javi Herrmann DPM;  Location: [facility]     REMOVE PORT  VASCULAR ACCESS Right 2/14/2018    Procedure: REMOVE PORT VASCULAR ACCESS;  right intra jugular port removal;  Surgeon: Keyon Blanco MD;  Location: [facility]     SHOULDER SURGERY Right 11/2015       Anesthesia Evaluation     . Pt has had prior anesthetic. Type: General and MAC    No history of anesthetic complications          ROS/MED HX    ENT/Pulmonary:     (+)ANTON risk factors hypertension, tobacco use, Past use , . .    Neurologic:  - neg neurologic ROS     Cardiovascular:     (+) hypertension----. : . . . :. . Previous cardiac testing date:results:date: results:ECG reviewed date:1/24/17 results:SR within normal limits. date: results:          METS/Exercise Tolerance:     Hematologic:  - neg hematologic  ROS       Musculoskeletal:  - neg musculoskeletal ROS      Lower extremity injury: ORIF Foot 2015.   GI/Hepatic:     (+) GERD Asymptomatic on medication,       Renal/Genitourinary:  - ROS Renal section negative   (+) Pt has no history of transplant,       Endo:  - neg endo ROS       Psychiatric:     (+) psychiatric history depression      Infectious Disease:  - neg infectious disease ROS       Malignancy:   (+) Malignancy History of Breast  Breast CA Active status post Chemo.         Other:    (+) H/O Chronic Pain,  - neg other ROS                      Physical Exam  Normal systems: cardiovascular, pulmonary and dental    Airway   Mallampati: II  TM distance: >3 FB  Neck ROM: full    Dental     Cardiovascular   Rhythm and rate: regular and normal      Pulmonary    breath sounds clear to auscultation            Lab Results   Component Value Date    WBC 6.7 11/15/2018    HGB 12.8 11/15/2018    HCT 39.1 11/15/2018     11/15/2018    SED 9 07/16/2008     03/25/2019    POTASSIUM 4.4 03/25/2019    CHLORIDE 103 03/25/2019    CO2 28 03/25/2019    BUN 24 03/25/2019    CR 1.07 (H) 03/25/2019     (H) 03/25/2019    VANDANA 9.7 03/25/2019    ALBUMIN 4.4 03/25/2019    PROTTOTAL 7.6 03/25/2019    ALT 37  "03/25/2019    AST 33 03/25/2019    GGT 43 (H) 10/06/2011    ALKPHOS 58 03/25/2019    BILITOTAL 0.5 03/25/2019    PTT 27 05/14/2008    INR 0.88 02/12/2018    TSH 1.34 01/24/2017       Preop Vitals  BP Readings from Last 3 Encounters:   04/08/19 90/60   03/21/19 148/71   01/11/19 126/78    Pulse Readings from Last 3 Encounters:   04/08/19 94   03/21/19 98   01/11/19 76      Resp Readings from Last 3 Encounters:   04/08/19 16   03/21/19 16   01/11/19 18    SpO2 Readings from Last 3 Encounters:   04/08/19 97%   01/11/19 97%   11/20/18 97%      Temp Readings from Last 1 Encounters:   04/08/19 98.3  F (36.8  C) (Temporal)    Ht Readings from Last 1 Encounters:   03/21/19 1.676 m (5' 6\")      Wt Readings from Last 1 Encounters:   04/08/19 62.3 kg (137 lb 6.4 oz)    Estimated body mass index is 22.18 kg/m  as calculated from the following:    Height as of 3/21/19: 1.676 m (5' 6\").    Weight as of 4/8/19: 62.3 kg (137 lb 6.4 oz).       Anesthesia Plan      History & Physical Review  History and physical reviewed and following examination; no interval change.    ASA Status:  2 .    NPO Status:  > 8 hours    Plan for MAC with Intravenous and Propofol induction. Maintenance will be TIVA.  Reason for MAC:  Deep or markedly invasive procedure (G8)         Postoperative Care      Consents  Anesthetic plan, risks, benefits and alternatives discussed with:  Patient.  Use of blood products discussed: No .   .                 LISA Eason CRNA  "

## 2019-04-11 NOTE — ANESTHESIA CARE TRANSFER NOTE
Patient: Michaela Dorman    Procedure(s):  INJECT EPIDURAL LUMBAR TRANSLAMINAR    Diagnosis: Lumbar Radiculopathy  Diagnosis Additional Information: No value filed.    Anesthesia Type:   MAC     Note:  Airway :Room Air  Patient transferred to:Phase II  Handoff Report: Identifed the Patient, Identified the Reponsible Provider, Reviewed the pertinent medical history, Discussed the surgical course, Reviewed Intra-OP anesthesia mangement and issues during anesthesia, Set expectations for post-procedure period and Allowed opportunity for questions and acknowledgement of understanding      Vitals: (Last set prior to Anesthesia Care Transfer)    CRNA VITALS  4/11/2019 1056 - 4/11/2019 1129      4/11/2019             Resp Rate (observed):  15    EKG:  Sinus rhythm                Electronically Signed By: LISA Garcia CRNA  April 11, 2019  11:29 AM

## 2019-04-12 NOTE — ANESTHESIA POSTPROCEDURE EVALUATION
Patient: Michaela Dorman    Procedure(s):  interlaminar epidual steroid injection lumbar 4-5    Diagnosis:Lumbar Radiculopathy  Diagnosis Additional Information: No value filed.    Anesthesia Type:  MAC    Note:  Anesthesia Post Evaluation    Patient location during evaluation: Phase 2 and Bedside  Patient participation: Able to fully participate in evaluation  Level of consciousness: awake  Pain management: adequate  Airway patency: patent  Cardiovascular status: acceptable and hemodynamically stable  Respiratory status: acceptable, room air and nonlabored ventilation  Hydration status: stable  PONV: none     Anesthetic complications: None    Comments: Patient was happy with the anesthesia care received and no anesthesia related complications were noted.  I will follow up with the patient again if it is needed.        Last vitals:  Vitals:    04/11/19 1019 04/11/19 1130 04/11/19 1145   BP: 142/73 126/70 144/87   Pulse: 74 64 75   Resp: 16  16   Temp: 98.1  F (36.7  C)     SpO2: 99% 95% 95%         Electronically Signed By: LISA Rust CRNA  April 11, 2019  9:21 PM

## 2019-04-14 ENCOUNTER — E-VISIT (OUTPATIENT)
Dept: FAMILY MEDICINE | Facility: OTHER | Age: 69
End: 2019-04-14
Payer: COMMERCIAL

## 2019-04-14 DIAGNOSIS — R30.0 DYSURIA: Primary | ICD-10-CM

## 2019-04-14 PROCEDURE — 99444 ZZC PHYSICIAN ONLINE EVALUATION & MANAGEMENT SERVICE: CPT | Performed by: PHYSICIAN ASSISTANT

## 2019-04-15 DIAGNOSIS — N30.00 ACUTE CYSTITIS WITHOUT HEMATURIA: Primary | ICD-10-CM

## 2019-04-15 DIAGNOSIS — R82.90 NONSPECIFIC FINDING ON EXAMINATION OF URINE: Primary | ICD-10-CM

## 2019-04-15 DIAGNOSIS — R30.0 DYSURIA: ICD-10-CM

## 2019-04-15 LAB
BACTERIA #/AREA URNS HPF: ABNORMAL /HPF
HYALINE CASTS #/AREA URNS LPF: ABNORMAL /LPF
MUCOUS THREADS #/AREA URNS LPF: PRESENT /LPF
NON-SQ EPI CELLS #/AREA URNS LPF: ABNORMAL /LPF
RBC #/AREA URNS AUTO: ABNORMAL /HPF
URNS CMNT MICRO: ABNORMAL
WBC #/AREA URNS AUTO: ABNORMAL /HPF

## 2019-04-15 PROCEDURE — 81015 MICROSCOPIC EXAM OF URINE: CPT | Performed by: PHYSICIAN ASSISTANT

## 2019-04-15 PROCEDURE — 87086 URINE CULTURE/COLONY COUNT: CPT | Performed by: PHYSICIAN ASSISTANT

## 2019-04-15 RX ORDER — SULFAMETHOXAZOLE/TRIMETHOPRIM 800-160 MG
1 TABLET ORAL 2 TIMES DAILY
Qty: 20 TABLET | Refills: 0 | Status: SHIPPED | OUTPATIENT
Start: 2019-04-15 | End: 2019-05-06

## 2019-04-15 RX ORDER — TAMSULOSIN HYDROCHLORIDE 0.4 MG/1
1 CAPSULE ORAL DAILY
Qty: 90 CAPSULE | Refills: 1 | Status: SHIPPED | OUTPATIENT
Start: 2019-04-15 | End: 2019-04-15

## 2019-04-15 RX ORDER — TAMSULOSIN HYDROCHLORIDE 0.4 MG/1
0.8 CAPSULE ORAL DAILY
Qty: 180 CAPSULE | Refills: 1 | Status: SHIPPED | OUTPATIENT
Start: 2019-04-15 | End: 2020-04-13

## 2019-04-16 ENCOUNTER — TELEPHONE (OUTPATIENT)
Dept: FAMILY MEDICINE | Facility: OTHER | Age: 69
End: 2019-04-16

## 2019-04-16 LAB
BACTERIA SPEC CULT: NORMAL
BACTERIA SPEC CULT: NORMAL
Lab: NORMAL
SPECIMEN SOURCE: NORMAL

## 2019-04-16 NOTE — TELEPHONE ENCOUNTER
[facility] Prior Authorization Team Request    Medication: Flomax qty limits exceeded needs PA  Dosing:   NDC (required for Medicaid members):     Insurance   BIN: [id number]  PCN: [id number]  Grp: n/a  ID: [id number]    CoverMyMeds Key (if applicable):     Additional documentation: quantity limits exceeded, prescription written for three capsules daily and insurance only allows two daily    Filling Pharmacy: [facility]  Phone Number: [phone]  Contact: [facility] (P [id number]) please send all responses to this pool.  Pharmacy NPI (required for Medicaid members):

## 2019-04-19 ENCOUNTER — MYC MEDICAL ADVICE (OUTPATIENT)
Dept: FAMILY MEDICINE | Facility: OTHER | Age: 69
End: 2019-04-19

## 2019-04-19 DIAGNOSIS — N30.00 ACUTE CYSTITIS WITHOUT HEMATURIA: Primary | ICD-10-CM

## 2019-04-19 RX ORDER — NITROFURANTOIN 25; 75 MG/1; MG/1
100 CAPSULE ORAL 2 TIMES DAILY
Qty: 14 CAPSULE | Refills: 0 | Status: SHIPPED | OUTPATIENT
Start: 2019-04-19 | End: 2019-09-09

## 2019-04-29 NOTE — TELEPHONE ENCOUNTER
PA Initiation    Medication: Flomax qty limits exceeded needs PA- INITIATED  Insurance Company: [employer] - Phone [phone] Fax [phone]  Pharmacy Filling the Rx: [facility] [place], [place] - [address]  Filling Pharmacy Phone: [phone]  Filling Pharmacy Fax:    Start Date: 4/29/2019

## 2019-04-29 NOTE — TELEPHONE ENCOUNTER
Prior Authorization Not Needed per Insurance    Medication: Flomax qty limits exceeded needs PA- not needed  Insurance Company: [email or web address] - Phone [phone] Fax [phone]  Expected CoPay:      Pharmacy Filling the Rx: [facility] [place], [place] - [address] [facility]  Pharmacy Notified:    Patient Notified:      Patient's plan allows 2 capsules per day- no PA is needed. Patient picked up #180 capsules on 4/15/19.

## 2019-04-30 ENCOUNTER — MYC MEDICAL ADVICE (OUTPATIENT)
Dept: FAMILY MEDICINE | Facility: OTHER | Age: 69
End: 2019-04-30

## 2019-04-30 DIAGNOSIS — N39.0 FREQUENT UTI: Primary | ICD-10-CM

## 2019-04-30 NOTE — TELEPHONE ENCOUNTER
Looks like per last mychart you wanted patient to recheck urine in 7-10 days, which was on 04/19/2019.  There are currently no orders in chart for this. Do you want patient to just do follow up labs or a follow up visit first?    Viral Jones,

## 2019-05-06 ENCOUNTER — MYC MEDICAL ADVICE (OUTPATIENT)
Dept: FAMILY MEDICINE | Facility: OTHER | Age: 69
End: 2019-05-06

## 2019-05-06 DIAGNOSIS — N30.00 ACUTE CYSTITIS WITHOUT HEMATURIA: Primary | ICD-10-CM

## 2019-05-06 RX ORDER — SULFAMETHOXAZOLE/TRIMETHOPRIM 800-160 MG
1 TABLET ORAL 2 TIMES DAILY
Qty: 20 TABLET | Refills: 0 | Status: SHIPPED | OUTPATIENT
Start: 2019-05-06 | End: 2019-05-14

## 2019-05-08 ENCOUNTER — MYC MEDICAL ADVICE (OUTPATIENT)
Dept: FAMILY MEDICINE | Facility: OTHER | Age: 69
End: 2019-05-08

## 2019-05-08 DIAGNOSIS — F43.21 ADJUSTMENT DISORDER WITH DEPRESSED MOOD: ICD-10-CM

## 2019-05-08 DIAGNOSIS — G89.4 CHRONIC PAIN SYNDROME: ICD-10-CM

## 2019-05-08 RX ORDER — ESCITALOPRAM OXALATE 20 MG/1
20 TABLET ORAL DAILY
Qty: 30 TABLET | Refills: 0 | Status: SHIPPED | OUTPATIENT
Start: 2019-05-08 | End: 2019-07-10

## 2019-05-10 DIAGNOSIS — C50.412 MALIGNANT NEOPLASM OF UPPER-OUTER QUADRANT OF LEFT FEMALE BREAST, UNSPECIFIED ESTROGEN RECEPTOR STATUS (H): ICD-10-CM

## 2019-05-10 LAB
ALBUMIN SERPL-MCNC: 4.3 G/DL (ref 3.4–5)
ALP SERPL-CCNC: 47 U/L (ref 40–150)
ALT SERPL W P-5'-P-CCNC: 35 U/L (ref 0–50)
ANION GAP SERPL CALCULATED.3IONS-SCNC: 8 MMOL/L (ref 3–14)
AST SERPL W P-5'-P-CCNC: 30 U/L (ref 0–45)
BASOPHILS # BLD AUTO: 0 10E9/L (ref 0–0.2)
BASOPHILS NFR BLD AUTO: 0.7 %
BILIRUB SERPL-MCNC: 0.3 MG/DL (ref 0.2–1.3)
BUN SERPL-MCNC: 23 MG/DL (ref 7–30)
CALCIUM SERPL-MCNC: 9.8 MG/DL (ref 8.5–10.1)
CANCER AG27-29 SERPL-ACNC: 24 U/ML (ref 0–39)
CHLORIDE SERPL-SCNC: 104 MMOL/L (ref 94–109)
CO2 SERPL-SCNC: 26 MMOL/L (ref 20–32)
CREAT SERPL-MCNC: 1.45 MG/DL (ref 0.52–1.04)
DIFFERENTIAL METHOD BLD: ABNORMAL
EOSINOPHIL NFR BLD AUTO: 2.4 %
ERYTHROCYTE [DISTWIDTH] IN BLOOD BY AUTOMATED COUNT: 14.7 % (ref 10–15)
GFR SERPL CREATININE-BSD FRML MDRD: 37 ML/MIN/{1.73_M2}
GLUCOSE SERPL-MCNC: 107 MG/DL (ref 70–99)
HCT VFR BLD AUTO: 37.4 % (ref 35–47)
HGB BLD-MCNC: 12 G/DL (ref 11.7–15.7)
IMM GRANULOCYTES # BLD: 0 10E9/L (ref 0–0.4)
IMM GRANULOCYTES NFR BLD: 0.4 %
LYMPHOCYTES # BLD AUTO: 1.5 10E9/L (ref 0.8–5.3)
LYMPHOCYTES NFR BLD AUTO: 27.8 %
MCH RBC QN AUTO: 33.5 PG (ref 26.5–33)
MCHC RBC AUTO-ENTMCNC: 32.1 G/DL (ref 31.5–36.5)
MCV RBC AUTO: 105 FL (ref 78–100)
MONOCYTES # BLD AUTO: 0.5 10E9/L (ref 0–1.3)
MONOCYTES NFR BLD AUTO: 8.2 %
NEUTROPHILS # BLD AUTO: 3.3 10E9/L (ref 1.6–8.3)
NEUTROPHILS NFR BLD AUTO: 60.5 %
NRBC # BLD AUTO: 0 10*3/UL
NRBC BLD AUTO-RTO: 0 /100
PLATELET # BLD AUTO: 285 10E9/L (ref 150–450)
POTASSIUM SERPL-SCNC: 4 MMOL/L (ref 3.4–5.3)
PROT SERPL-MCNC: 7.7 G/DL (ref 6.8–8.8)
RBC # BLD AUTO: 3.58 10E12/L (ref 3.8–5.2)
SODIUM SERPL-SCNC: 138 MMOL/L (ref 133–144)
WBC # BLD AUTO: 5.5 10E9/L (ref 4–11)

## 2019-05-10 PROCEDURE — 86300 IMMUNOASSAY TUMOR CA 15-3: CPT | Performed by: INTERNAL MEDICINE

## 2019-05-10 PROCEDURE — 85025 COMPLETE CBC W/AUTO DIFF WBC: CPT | Performed by: INTERNAL MEDICINE

## 2019-05-10 PROCEDURE — 80053 COMPREHEN METABOLIC PANEL: CPT | Performed by: INTERNAL MEDICINE

## 2019-05-10 PROCEDURE — 36415 COLL VENOUS BLD VENIPUNCTURE: CPT | Performed by: INTERNAL MEDICINE

## 2019-05-14 ENCOUNTER — ONCOLOGY VISIT (OUTPATIENT)
Dept: ONCOLOGY | Facility: CLINIC | Age: 69
End: 2019-05-14
Payer: COMMERCIAL

## 2019-05-14 VITALS
WEIGHT: 135.9 LBS | OXYGEN SATURATION: 95 % | HEART RATE: 91 BPM | TEMPERATURE: 98.4 F | HEIGHT: 66 IN | DIASTOLIC BLOOD PRESSURE: 60 MMHG | SYSTOLIC BLOOD PRESSURE: 114 MMHG | BODY MASS INDEX: 21.84 KG/M2 | RESPIRATION RATE: 18 BRPM

## 2019-05-14 DIAGNOSIS — F43.21 ADJUSTMENT DISORDER WITH DEPRESSED MOOD: ICD-10-CM

## 2019-05-14 DIAGNOSIS — I10 ESSENTIAL HYPERTENSION: ICD-10-CM

## 2019-05-14 DIAGNOSIS — C50.412 MALIGNANT NEOPLASM OF UPPER-OUTER QUADRANT OF LEFT FEMALE BREAST, UNSPECIFIED ESTROGEN RECEPTOR STATUS (H): Primary | ICD-10-CM

## 2019-05-14 DIAGNOSIS — I10 HYPERTENSION GOAL BP (BLOOD PRESSURE) < 140/90: ICD-10-CM

## 2019-05-14 PROCEDURE — 99214 OFFICE O/P EST MOD 30 MIN: CPT | Performed by: INTERNAL MEDICINE

## 2019-05-14 RX ORDER — SULFAMETHOXAZOLE/TRIMETHOPRIM 800-160 MG
1 TABLET ORAL 2 TIMES DAILY
Qty: 20 TABLET | Refills: 0 | COMMUNITY
Start: 2019-05-14 | End: 2019-09-09

## 2019-05-14 ASSESSMENT — MIFFLIN-ST. JEOR: SCORE: 1158.19

## 2019-05-14 ASSESSMENT — PAIN SCALES - GENERAL: PAINLEVEL: NO PAIN (0)

## 2019-05-14 NOTE — PROGRESS NOTES
Hematology/ Oncology Follow-up Visit:  May 14, 2019    Reason for Visit:   Chief Complaint   Patient presents with     Oncology Clinic Visit     6 month follow up-Malignant neoplasm of upper-outer quadrant of left female breast, unspecified estrogen receptor status      Results     labs completed 5/10/19       Oncologic History:  Malignant neoplasm of upper-outer quadrant of left female breast (H)  Michaela Dorman  initially felt a mass in her left axilla. She then underwent mammogram which showed dense breasts with no suspicious lesions in 4/2016. She was then seen by surgery and underwent excisional LN biopsy that showed metastatic high-grade poorly differentiated carcinoma with a tumor size of 2.5 cm. Immunohistochemistry was done for ER/FL receptors that came back both negative. HER-2/boubacar was amplified by FISH. She then underwent MRI of the breast on 9/26/2016 that showed suspicious abnormality with multicentric left sided breast cancer that involved the left lateral breast form the nipple to the chest wall. A PET scan was obtained on 10/5/2016 that showed a hypermetabolic opacity in the left axilla wihtout other pathological activity. It was then recommended that she undergo neoadjuvant chemotherapy with Cytoxan and Doxorubicin that will be followed by Taxol, Herceptin. Patient initiated treatment on 10/11/2016.         Interval History:  The patient returning today for follow-up visit.  She has been feeling well without any recent complaints weight loss night sweats fever or chills.  She denies any nausea vomiting or diarrhea.  She denies any shortness of breath or cough or wheezing.    Review Of Systems:  Constitutional: Negative for fever, chills, and night sweats.  Skin: negative.  Eyes: negative.  Ears/Nose/Throat: negative.  Respiratory: No shortness of breath, dyspnea on exertion, cough, or hemoptysis.  Cardiovascular: negative.  Gastrointestinal: negative.  Genitourinary: negative.  Musculoskeletal:  "negative.  Neurologic: negative.  Psychiatric: negative.  Hematologic/Lymphatic/Immunologic: negative.  Endocrine: negative.    All other ROS negative unless mentioned in interval history.    Past medical, social, surgical, and family histories reviewed.    Allergies:  Allergies as of 05/14/2019 - Reviewed 05/14/2019   Allergen Reaction Noted     No known drug allergies  07/05/2002       Current Medications:  Current Outpatient Medications   Medication Sig Dispense Refill     atorvastatin (LIPITOR) 20 MG tablet TAKE ONE TABLET BY MOUTH EVERY DAY 30 tablet 0     escitalopram (LEXAPRO) 20 MG tablet Take 1 tablet (20 mg) by mouth daily 30 tablet 0     hydrochlorothiazide (HYDRODIURIL) 25 MG tablet TAKE ONE TABLET BY MOUTH EVERY DAY 90 tablet 1     Lansoprazole (PREVACID PO) Take 40 mg by mouth       oxyCODONE-acetaminophen (PERCOCET) 5-325 MG tablet Take 1-2 tablets by mouth every 6 hours as needed for moderate to severe pain (#30 to last 30 days) 30 tablet 0     prochlorperazine (COMPAZINE) 10 MG tablet TAKE ONE TABLET BY MOUTH EVERY 6 HOURS AS NEEDED (NAUSEA/VOMITING) 30 tablet 3     sulfamethoxazole-trimethoprim (BACTRIM DS/SEPTRA DS) 800-160 MG tablet Take 1 tablet by mouth 2 times daily 20 tablet 0     tamsulosin (FLOMAX) 0.4 MG capsule Take 2 capsules (0.8 mg) by mouth daily 180 capsule 1     acetaminophen (TYLENOL EX ST ARTHRITIS PAIN) 500 MG tablet Take 500-1,000 mg by mouth every 6 hours as needed for mild pain       hydrocortisone 2.5 % ointment Apply twice daily to chin as needed for itch/rash. Use for up to two weeks. (Patient not taking: Reported on 5/14/2019) 20 g 0     NONFORMULARY           Physical Exam:  /60   Pulse 91   Temp 98.4  F (36.9  C) (Temporal)   Resp 18   Ht 1.676 m (5' 6\")   Wt 61.6 kg (135 lb 14.4 oz)   SpO2 95%   BMI 21.93 kg/m    Wt Readings from Last 12 Encounters:   05/14/19 61.6 kg (135 lb 14.4 oz)   04/08/19 62.3 kg (137 lb 6.4 oz)   03/21/19 62.5 kg (137 lb 12.8 oz) " "  01/11/19 62.1 kg (136 lb 12.8 oz)   11/20/18 62.2 kg (137 lb 3.2 oz)   10/25/18 62.6 kg (138 lb)   09/07/18 64 kg (141 lb 1.6 oz)   06/05/18 65.1 kg (143 lb 9.6 oz)   05/15/18 63.5 kg (140 lb)   02/26/18 63 kg (139 lb)   02/16/18 64.5 kg (142 lb 3.2 oz)   02/14/18 63.6 kg (140 lb 4.8 oz)     ECOG performance status: 0  GENERAL APPEARANCE: Healthy, alert and in no acute distress.  HEENT: Sclerae anicteric. PERRLA. Oropharynx without ulcers, lesions, or thrush.  NECK: Supple. No asymmetry or masses.  LYMPHATICS: No palpable cervical, supraclavicular, axillary, or inguinal lymphadenopathy.  RESP: Lungs clear to auscultation bilaterally without rales, rhonchi or wheezes.\",BREAST: Right- No mass, nipple discharge or axillary adenopathy. Left- No mass, nipple discharge or axillary adenopathy \"CARDIOVASCULAR: Regular rate and rhythm. Normal S1, S2; no S3 or S4. No murmur, gallop, or rub.  ABDOMEN: Soft, nontender. Bowel sounds normal. No palpable organomegaly or masses.  MUSCULOSKELETAL: Extremities without gross deformities noted. No edema of bilateral lower extremities.  SKIN: No suspicious lesions or rashes.  NEURO: Alert and oriented x 3. Cranial nerves II-XII grossly intact.  PSYCHIATRIC: Mentation and affect appear normal.    Laboratory/Imaging Studies:  Orders Only on 05/10/2019   Component Date Value Ref Range Status     CA 27-29 05/10/2019 24  0 - 39 U/mL Final    Assay Method:  Chemiluminescence using Siemens Centaur XP     Sodium 05/10/2019 138  133 - 144 mmol/L Final     Potassium 05/10/2019 4.0  3.4 - 5.3 mmol/L Final     Chloride 05/10/2019 104  94 - 109 mmol/L Final     Carbon Dioxide 05/10/2019 26  20 - 32 mmol/L Final     Anion Gap 05/10/2019 8  3 - 14 mmol/L Final     Glucose 05/10/2019 107* 70 - 99 mg/dL Final     Urea Nitrogen 05/10/2019 23  7 - 30 mg/dL Final     Creatinine 05/10/2019 1.45* 0.52 - 1.04 mg/dL Final     GFR Estimate 05/10/2019 37* >60 mL/min/[1.73_m2] Final    Comment: Non  " American GFR Calc  Starting 12/18/2018, serum creatinine based estimated GFR (eGFR) will be   calculated using the Chronic Kidney Disease Epidemiology Collaboration   (CKD-EPI) equation.       GFR Estimate If Black 05/10/2019 42* >60 mL/min/[1.73_m2] Final    Comment:  GFR Calc  Starting 12/18/2018, serum creatinine based estimated GFR (eGFR) will be   calculated using the Chronic Kidney Disease Epidemiology Collaboration   (CKD-EPI) equation.       Calcium 05/10/2019 9.8  8.5 - 10.1 mg/dL Final     Bilirubin Total 05/10/2019 0.3  0.2 - 1.3 mg/dL Final     Albumin 05/10/2019 4.3  3.4 - 5.0 g/dL Final     Protein Total 05/10/2019 7.7  6.8 - 8.8 g/dL Final     Alkaline Phosphatase 05/10/2019 47  40 - 150 U/L Final     ALT 05/10/2019 35  0 - 50 U/L Final     AST 05/10/2019 30  0 - 45 U/L Final     WBC 05/10/2019 5.5  4.0 - 11.0 10e9/L Final     RBC Count 05/10/2019 3.58* 3.8 - 5.2 10e12/L Final     Hemoglobin 05/10/2019 12.0  11.7 - 15.7 g/dL Final     Hematocrit 05/10/2019 37.4  35.0 - 47.0 % Final     MCV 05/10/2019 105* 78 - 100 fl Final     MCH 05/10/2019 33.5* 26.5 - 33.0 pg Final     MCHC 05/10/2019 32.1  31.5 - 36.5 g/dL Final     RDW 05/10/2019 14.7  10.0 - 15.0 % Final     Platelet Count 05/10/2019 285  150 - 450 10e9/L Final     Diff Method 05/10/2019 Automated Method   Final     % Neutrophils 05/10/2019 60.5  % Final     % Lymphocytes 05/10/2019 27.8  % Final     % Monocytes 05/10/2019 8.2  % Final     % Eosinophils 05/10/2019 2.4  % Final     % Basophils 05/10/2019 0.7  % Final     % Immature Granulocytes 05/10/2019 0.4  % Final     Nucleated RBCs 05/10/2019 0  0 /100 Final     Absolute Neutrophil 05/10/2019 3.3  1.6 - 8.3 10e9/L Final     Absolute Lymphocytes 05/10/2019 1.5  0.8 - 5.3 10e9/L Final     Absolute Monocytes 05/10/2019 0.5  0.0 - 1.3 10e9/L Final     Absolute Basophils 05/10/2019 0.0  0.0 - 0.2 10e9/L Final     Abs Immature Granulocytes 05/10/2019 0.0  0 - 0.4 10e9/L Final      Absolute Nucleated RBC 05/10/2019 0.0   Final          Assessment and plan:    (C50.412) Malignant neoplasm of upper-outer quadrant of left female breast, unspecified estrogen receptor status (H)  (primary encounter diagnosis)  I reviewed with the patient today most recent laboratory test and imaging studies.  There is no clinical evidence of breast cancer recurrence.  I will see the patient again in 6 months time or sooner if there are new developments or concerns.    (F43.21) Adjustment disorder with depressed mood  Patient currently on Lexapro 20 mg orally daily.    (I10) Essential hypertension  Blood pressure is currently well controlled with the patient currently on hydrochlorothiazide 25 mg orally daily.      The patient is ready to learn, no apparent learning barriers were identified.  Diagnosis and treatment plans were explained to the patient. The patient expressed understanding of the content. The patient asked appropriate questions. The patient questions were answered to her satisfaction.    Chart documentation with Dragon Voice recognition Software. Although reviewed after completion, some words and grammatical errors may remain.

## 2019-05-14 NOTE — PATIENT INSTRUCTIONS
Please follow up with Dr. Ferguson in 6 months.  Please schedule labs 1-5 days prior to follow up appointment.    Lab Date/Time:    Office visit follow up with Dr. Ferguson Date/Time:     If you have any questions or concerns please feel free to call.    If you need to reschedule please call:  Clinic or Lab Appointment - [phone]  Infusion - [phone]  Imaging - [phone]    Juan José Hernandez, RN, BSN, OCN  [facility]   Oncology/Hematology Care Coordinator RN  [facility]  [phone]

## 2019-05-14 NOTE — NURSING NOTE
DISCHARGE PLAN:  Next appointments: See patient instruction section  Departure Mode: Ambulatory  Accompanied by: self  8 minutes for nursing discharge (face to face time)     Michaela Dorman is here today for Oncology follow up.  Writing nurse seen patient after Medical Oncology appointment to address questions/concerns/coordinate care. Patient to follow up in 6 months with labs couple days prior.  Did review and discuss developing a different normal and not concentrate on getting back to her old normal.  Emotional support provided.  Patient ambulated by nurse to  to schedule follow up and/or lab appointments.  Imaging scheduled if ordered.  See patient instructions and Oncologist's Progress note for further details. Questions and concerns addressed to patient's satisfaction. Patient verbalized and demonstrated understanding of plan.  Contact information provided and patient is encouraged to call with any that arise in the interim of care.    Juan José Hernandez, RN, BSN, OCN   Oncology Care Coordinator RN  [facility]  [phone]  5/14/2019, 10:24 AM

## 2019-05-14 NOTE — PROGRESS NOTES
"Oncology Rooming Note    May 14, 2019 9:56 AM   Michaela Dorman is a 68 year old female who presents for:    Chief Complaint   Patient presents with     Oncology Clinic Visit     6 month follow up-Malignant neoplasm of upper-outer quadrant of left female breast, unspecified estrogen receptor status      Results     labs completed 5/10/19     Initial Vitals: /60   Pulse 91   Temp 98.4  F (36.9  C) (Temporal)   Resp 18   Ht 1.676 m (5' 6\")   Wt 61.6 kg (135 lb 14.4 oz)   SpO2 95%   BMI 21.93 kg/m   Estimated body mass index is 21.93 kg/m  as calculated from the following:    Height as of this encounter: 1.676 m (5' 6\").    Weight as of this encounter: 61.6 kg (135 lb 14.4 oz). Body surface area is 1.69 meters squared.  No Pain (0) Comment: Data Unavailable   No LMP recorded. Patient is postmenopausal.  Allergies reviewed: Yes  Medications reviewed: Yes    Medications: Medication refills not needed today.  Pharmacy name entered into EPIC:    [facility] - [facility] [place], [place] - [address]  [facility] [place], [place] - [address]  [facility] [place], [place] - [address] [facility] [place], [place] - [address]    Clinical concerns: none Dr. Ferguson was NOT notified.      Solange Cleveland              "

## 2019-05-14 NOTE — ASSESSMENT & PLAN NOTE
Michaela Dorman  initially felt a mass in her left axilla. She then underwent mammogram which showed dense breasts with no suspicious lesions in 4/2016. She was then seen by surgery and underwent excisional LN biopsy that showed metastatic high-grade poorly differentiated carcinoma with a tumor size of 2.5 cm. Immunohistochemistry was done for ER/MA receptors that came back both negative. HER-2/boubacar was amplified by FISH. She then underwent MRI of the breast on 9/26/2016 that showed suspicious abnormality with multicentric left sided breast cancer that involved the left lateral breast form the nipple to the chest wall. A PET scan was obtained on 10/5/2016 that showed a hypermetabolic opacity in the left axilla wihtout other pathological activity. It was then recommended that she undergo neoadjuvant chemotherapy with Cytoxan and Doxorubicin that will be followed by Taxol, Herceptin. Patient initiated treatment on 10/11/2016.

## 2019-05-14 NOTE — LETTER
"    5/14/2019         RE: Michaela Dorman  [address]  [facility] [address]        Dear Colleague,    Thank you for referring your patient, Michaela Dorman, to the [facility]. Please see a copy of my visit note below.    Oncology Rooming Note    May 14, 2019 9:56 AM   Michaela Dorman is a 68 year old female who presents for:    Chief Complaint   Patient presents with     Oncology Clinic Visit     6 month follow up-Malignant neoplasm of upper-outer quadrant of left female breast, unspecified estrogen receptor status      Results     labs completed 5/10/19     Initial Vitals: /60   Pulse 91   Temp 98.4  F (36.9  C) (Temporal)   Resp 18   Ht 1.676 m (5' 6\")   Wt 61.6 kg (135 lb 14.4 oz)   SpO2 95%   BMI 21.93 kg/m    Estimated body mass index is 21.93 kg/m  as calculated from the following:    Height as of this encounter: 1.676 m (5' 6\").    Weight as of this encounter: 61.6 kg (135 lb 14.4 oz). Body surface area is 1.69 meters squared.  No Pain (0) Comment: Data Unavailable   No LMP recorded. Patient is postmenopausal.  Allergies reviewed: Yes  Medications reviewed: Yes    Medications: Medication refills not needed today.  Pharmacy name entered into EPIC:    [facility] - [facility] [address] [place] [address] - [address]  [facility] [place] - [address]  [facility] [place], [place] - [address] [facility] - [place], [place] - [address]     Clinical concerns: none Dr. Ferguson was NOT notified.      Solange Cleveland                Hematology/ Oncology Follow-up Visit:  May 14, 2019    Reason for Visit:   Chief Complaint   Patient presents with     Oncology Clinic Visit     6 month follow up-Malignant neoplasm of upper-outer quadrant of left female breast, unspecified estrogen receptor status      Results     labs completed 5/10/19       Oncologic History:  Malignant neoplasm of " upper-outer quadrant of left female breast (H)  Michaela Dorman  initially felt a mass in her left axilla. She then underwent mammogram which showed dense breasts with no suspicious lesions in 4/2016. She was then seen by surgery and underwent excisional LN biopsy that showed metastatic high-grade poorly differentiated carcinoma with a tumor size of 2.5 cm. Immunohistochemistry was done for ER/LA receptors that came back both negative. HER-2/boubacar was amplified by FISH. She then underwent MRI of the breast on 9/26/2016 that showed suspicious abnormality with multicentric left sided breast cancer that involved the left lateral breast form the nipple to the chest wall. A PET scan was obtained on 10/5/2016 that showed a hypermetabolic opacity in the left axilla wihtout other pathological activity. It was then recommended that she undergo neoadjuvant chemotherapy with Cytoxan and Doxorubicin that will be followed by Taxol, Herceptin. Patient initiated treatment on 10/11/2016.         Interval History:  The patient returning today for follow-up visit.  She has been feeling well without any recent complaints weight loss night sweats fever or chills.  She denies any nausea vomiting or diarrhea.  She denies any shortness of breath or cough or wheezing.    Review Of Systems:  Constitutional: Negative for fever, chills, and night sweats.  Skin: negative.  Eyes: negative.  Ears/Nose/Throat: negative.  Respiratory: No shortness of breath, dyspnea on exertion, cough, or hemoptysis.  Cardiovascular: negative.  Gastrointestinal: negative.  Genitourinary: negative.  Musculoskeletal: negative.  Neurologic: negative.  Psychiatric: negative.  Hematologic/Lymphatic/Immunologic: negative.  Endocrine: negative.    All other ROS negative unless mentioned in interval history.    Past medical, social, surgical, and family histories reviewed.    Allergies:  Allergies as of 05/14/2019 - Reviewed 05/14/2019   Allergen Reaction Noted     No  "known drug allergies  07/05/2002       Current Medications:  Current Outpatient Medications   Medication Sig Dispense Refill     atorvastatin (LIPITOR) 20 MG tablet TAKE ONE TABLET BY MOUTH EVERY DAY 30 tablet 0     escitalopram (LEXAPRO) 20 MG tablet Take 1 tablet (20 mg) by mouth daily 30 tablet 0     hydrochlorothiazide (HYDRODIURIL) 25 MG tablet TAKE ONE TABLET BY MOUTH EVERY DAY 90 tablet 1     Lansoprazole (PREVACID PO) Take 40 mg by mouth       oxyCODONE-acetaminophen (PERCOCET) 5-325 MG tablet Take 1-2 tablets by mouth every 6 hours as needed for moderate to severe pain (#30 to last 30 days) 30 tablet 0     prochlorperazine (COMPAZINE) 10 MG tablet TAKE ONE TABLET BY MOUTH EVERY 6 HOURS AS NEEDED (NAUSEA/VOMITING) 30 tablet 3     sulfamethoxazole-trimethoprim (BACTRIM DS/SEPTRA DS) 800-160 MG tablet Take 1 tablet by mouth 2 times daily 20 tablet 0     tamsulosin (FLOMAX) 0.4 MG capsule Take 2 capsules (0.8 mg) by mouth daily 180 capsule 1     acetaminophen (TYLENOL EX ST ARTHRITIS PAIN) 500 MG tablet Take 500-1,000 mg by mouth every 6 hours as needed for mild pain       hydrocortisone 2.5 % ointment Apply twice daily to chin as needed for itch/rash. Use for up to two weeks. (Patient not taking: Reported on 5/14/2019) 20 g 0     NONFORMULARY           Physical Exam:  /60   Pulse 91   Temp 98.4  F (36.9  C) (Temporal)   Resp 18   Ht 1.676 m (5' 6\")   Wt 61.6 kg (135 lb 14.4 oz)   SpO2 95%   BMI 21.93 kg/m     Wt Readings from Last 12 Encounters:   05/14/19 61.6 kg (135 lb 14.4 oz)   04/08/19 62.3 kg (137 lb 6.4 oz)   03/21/19 62.5 kg (137 lb 12.8 oz)   01/11/19 62.1 kg (136 lb 12.8 oz)   11/20/18 62.2 kg (137 lb 3.2 oz)   10/25/18 62.6 kg (138 lb)   09/07/18 64 kg (141 lb 1.6 oz)   06/05/18 65.1 kg (143 lb 9.6 oz)   05/15/18 63.5 kg (140 lb)   02/26/18 63 kg (139 lb)   02/16/18 64.5 kg (142 lb 3.2 oz)   02/14/18 63.6 kg (140 lb 4.8 oz)     ECOG performance status: 0  GENERAL APPEARANCE: Healthy, " "alert and in no acute distress.  HEENT: Sclerae anicteric. PERRLA. Oropharynx without ulcers, lesions, or thrush.  NECK: Supple. No asymmetry or masses.  LYMPHATICS: No palpable cervical, supraclavicular, axillary, or inguinal lymphadenopathy.  RESP: Lungs clear to auscultation bilaterally without rales, rhonchi or wheezes.\",BREAST: Right- No mass, nipple discharge or axillary adenopathy. Left- No mass, nipple discharge or axillary adenopathy \"CARDIOVASCULAR: Regular rate and rhythm. Normal S1, S2; no S3 or S4. No murmur, gallop, or rub.  ABDOMEN: Soft, nontender. Bowel sounds normal. No palpable organomegaly or masses.  MUSCULOSKELETAL: Extremities without gross deformities noted. No edema of bilateral lower extremities.  SKIN: No suspicious lesions or rashes.  NEURO: Alert and oriented x 3. Cranial nerves II-XII grossly intact.  PSYCHIATRIC: Mentation and affect appear normal.    Laboratory/Imaging Studies:  Orders Only on 05/10/2019   Component Date Value Ref Range Status     CA 27-29 05/10/2019 24  0 - 39 U/mL Final    Assay Method:  Chemiluminescence using Siemens Centaur XP     Sodium 05/10/2019 138  133 - 144 mmol/L Final     Potassium 05/10/2019 4.0  3.4 - 5.3 mmol/L Final     Chloride 05/10/2019 104  94 - 109 mmol/L Final     Carbon Dioxide 05/10/2019 26  20 - 32 mmol/L Final     Anion Gap 05/10/2019 8  3 - 14 mmol/L Final     Glucose 05/10/2019 107* 70 - 99 mg/dL Final     Urea Nitrogen 05/10/2019 23  7 - 30 mg/dL Final     Creatinine 05/10/2019 1.45* 0.52 - 1.04 mg/dL Final     GFR Estimate 05/10/2019 37* >60 mL/min/[1.73_m2] Final    Comment: Non  GFR Calc  Starting 12/18/2018, serum creatinine based estimated GFR (eGFR) will be   calculated using the Chronic Kidney Disease Epidemiology Collaboration   (CKD-EPI) equation.       GFR Estimate If Black 05/10/2019 42* >60 mL/min/[1.73_m2] Final    Comment:  GFR Calc  Starting 12/18/2018, serum creatinine based estimated GFR " (eGFR) will be   calculated using the Chronic Kidney Disease Epidemiology Collaboration   (CKD-EPI) equation.       Calcium 05/10/2019 9.8  8.5 - 10.1 mg/dL Final     Bilirubin Total 05/10/2019 0.3  0.2 - 1.3 mg/dL Final     Albumin 05/10/2019 4.3  3.4 - 5.0 g/dL Final     Protein Total 05/10/2019 7.7  6.8 - 8.8 g/dL Final     Alkaline Phosphatase 05/10/2019 47  40 - 150 U/L Final     ALT 05/10/2019 35  0 - 50 U/L Final     AST 05/10/2019 30  0 - 45 U/L Final     WBC 05/10/2019 5.5  4.0 - 11.0 10e9/L Final     RBC Count 05/10/2019 3.58* 3.8 - 5.2 10e12/L Final     Hemoglobin 05/10/2019 12.0  11.7 - 15.7 g/dL Final     Hematocrit 05/10/2019 37.4  35.0 - 47.0 % Final     MCV 05/10/2019 105* 78 - 100 fl Final     MCH 05/10/2019 33.5* 26.5 - 33.0 pg Final     MCHC 05/10/2019 32.1  31.5 - 36.5 g/dL Final     RDW 05/10/2019 14.7  10.0 - 15.0 % Final     Platelet Count 05/10/2019 285  150 - 450 10e9/L Final     Diff Method 05/10/2019 Automated Method   Final     % Neutrophils 05/10/2019 60.5  % Final     % Lymphocytes 05/10/2019 27.8  % Final     % Monocytes 05/10/2019 8.2  % Final     % Eosinophils 05/10/2019 2.4  % Final     % Basophils 05/10/2019 0.7  % Final     % Immature Granulocytes 05/10/2019 0.4  % Final     Nucleated RBCs 05/10/2019 0  0 /100 Final     Absolute Neutrophil 05/10/2019 3.3  1.6 - 8.3 10e9/L Final     Absolute Lymphocytes 05/10/2019 1.5  0.8 - 5.3 10e9/L Final     Absolute Monocytes 05/10/2019 0.5  0.0 - 1.3 10e9/L Final     Absolute Basophils 05/10/2019 0.0  0.0 - 0.2 10e9/L Final     Abs Immature Granulocytes 05/10/2019 0.0  0 - 0.4 10e9/L Final     Absolute Nucleated RBC 05/10/2019 0.0   Final          Assessment and plan:    (C50.412) Malignant neoplasm of upper-outer quadrant of left female breast, unspecified estrogen receptor status (H)  (primary encounter diagnosis)  I reviewed with the patient today most recent laboratory test and imaging studies.  There is no clinical evidence of breast  cancer recurrence.  I will see the patient again in 6 months time or sooner if there are new developments or concerns.    (F43.21) Adjustment disorder with depressed mood  Patient currently on Lexapro 20 mg orally daily.    (I10) Essential hypertension  Blood pressure is currently well controlled with the patient currently on hydrochlorothiazide 25 mg orally daily.      The patient is ready to learn, no apparent learning barriers were identified.  Diagnosis and treatment plans were explained to the patient. The patient expressed understanding of the content. The patient asked appropriate questions. The patient questions were answered to her satisfaction.    Chart documentation with Dragon Voice recognition Software. Although reviewed after completion, some words and grammatical errors may remain.    Again, thank you for allowing me to participate in the care of your patient.        Sincerely,        Syeda Ferguson MD

## 2019-05-31 ENCOUNTER — MYC MEDICAL ADVICE (OUTPATIENT)
Dept: FAMILY MEDICINE | Facility: OTHER | Age: 69
End: 2019-05-31

## 2019-05-31 DIAGNOSIS — M51.369 DDD (DEGENERATIVE DISC DISEASE), LUMBAR: ICD-10-CM

## 2019-05-31 DIAGNOSIS — G89.4 CHRONIC PAIN SYNDROME: ICD-10-CM

## 2019-05-31 RX ORDER — OXYCODONE AND ACETAMINOPHEN 5; 325 MG/1; MG/1
1-2 TABLET ORAL EVERY 6 HOURS PRN
Qty: 30 TABLET | Refills: 0 | Status: SHIPPED | OUTPATIENT
Start: 2019-05-31 | End: 2019-07-29

## 2019-05-31 NOTE — TELEPHONE ENCOUNTER
Pending Prescriptions:                       Disp   Refills    oxyCODONE-acetaminophen (PERCOCET) 5-325 *30 tab*0            Sig: Take 1-2 tablets by mouth every 6 hours as needed           for moderate to severe pain (#30 to last 30 days)    Last ov 04/08/2019  Last filled 04/04/2019

## 2019-06-17 PROBLEM — Z79.2 LONG TERM CURRENT USE OF ANTIBIOTICS: Status: ACTIVE | Noted: 2017-06-05

## 2019-06-18 ENCOUNTER — MYC MEDICAL ADVICE (OUTPATIENT)
Dept: FAMILY MEDICINE | Facility: OTHER | Age: 69
End: 2019-06-18

## 2019-06-27 ENCOUNTER — MYC MEDICAL ADVICE (OUTPATIENT)
Dept: FAMILY MEDICINE | Facility: OTHER | Age: 69
End: 2019-06-27

## 2019-06-28 DIAGNOSIS — F41.9 ANXIETY: ICD-10-CM

## 2019-06-28 DIAGNOSIS — F43.21 ADJUSTMENT DISORDER WITH DEPRESSED MOOD: Primary | ICD-10-CM

## 2019-06-28 RX ORDER — ALPRAZOLAM 0.5 MG
0.5 TABLET ORAL 3 TIMES DAILY PRN
Qty: 30 TABLET | Refills: 0 | Status: SHIPPED | OUTPATIENT
Start: 2019-06-28 | End: 2019-09-09

## 2019-07-10 ENCOUNTER — MYC MEDICAL ADVICE (OUTPATIENT)
Dept: FAMILY MEDICINE | Facility: OTHER | Age: 69
End: 2019-07-10

## 2019-07-10 DIAGNOSIS — G89.4 CHRONIC PAIN SYNDROME: ICD-10-CM

## 2019-07-10 DIAGNOSIS — F43.21 ADJUSTMENT DISORDER WITH DEPRESSED MOOD: ICD-10-CM

## 2019-07-10 NOTE — TELEPHONE ENCOUNTER
lexapro  Routing refill request to provider for review/approval because:  A break in medication    Mallika Barahona, RN, BSN

## 2019-07-11 RX ORDER — ESCITALOPRAM OXALATE 20 MG/1
TABLET ORAL
Qty: 90 TABLET | Refills: 1 | Status: SHIPPED | OUTPATIENT
Start: 2019-07-11 | End: 2020-01-20

## 2019-07-25 ENCOUNTER — MYC MEDICAL ADVICE (OUTPATIENT)
Dept: FAMILY MEDICINE | Facility: OTHER | Age: 69
End: 2019-07-25

## 2019-07-26 NOTE — TELEPHONE ENCOUNTER
I recommend f/u with [facility] as I do not see pain contract and have not seen this patient before.     Thank you  Maude Mederos CNP

## 2019-07-29 ENCOUNTER — MYC MEDICAL ADVICE (OUTPATIENT)
Dept: FAMILY MEDICINE | Facility: OTHER | Age: 69
End: 2019-07-29

## 2019-07-29 DIAGNOSIS — M51.369 DDD (DEGENERATIVE DISC DISEASE), LUMBAR: ICD-10-CM

## 2019-07-29 DIAGNOSIS — G89.4 CHRONIC PAIN SYNDROME: ICD-10-CM

## 2019-07-29 RX ORDER — OXYCODONE AND ACETAMINOPHEN 5; 325 MG/1; MG/1
1-2 TABLET ORAL EVERY 6 HOURS PRN
Qty: 30 TABLET | Refills: 0 | Status: SHIPPED | OUTPATIENT
Start: 2019-07-29 | End: 2019-09-09

## 2019-07-29 NOTE — TELEPHONE ENCOUNTER
Provider to review. Refill pending.  Rocio Velásquez CMA ([facility])

## 2019-08-15 ENCOUNTER — OFFICE VISIT (OUTPATIENT)
Dept: DERMATOLOGY | Facility: CLINIC | Age: 69
End: 2019-08-15
Payer: COMMERCIAL

## 2019-08-15 DIAGNOSIS — R23.8 PAPULE: Primary | ICD-10-CM

## 2019-08-15 PROCEDURE — 11900 INJECT SKIN LESIONS </W 7: CPT | Performed by: DERMATOLOGY

## 2019-08-15 ASSESSMENT — PAIN SCALES - GENERAL: PAINLEVEL: NO PAIN (0)

## 2019-08-15 NOTE — LETTER
8/15/2019         RE: Michaela Dorman  [address]  [facility] [address]        Dear Colleague,    Thank you for referring your patient, Michaela Dorman, to the [facility]. Please see a copy of my visit note below.    [facility] Dermatology Note      Dermatology Problem List:  1.BCC, left cheek  -s/p Mohs 8/18/2016  2. Actinic keratosis bordering on SCCIS, upper chest (left chest)  -s/p excision 8/29/2011  3. Actinic keratosis  -s/p cryotherapy  -s/p PDT, 11/21/2018  4. History of lost biopsy specimen from R chest on 6/15/17. Repeat biopsy 8/2/17 showed only scar.  5. Suspected bug bites leading to prurigo nodules on left dorsal hand  -Treat with lidex 0.5% cream BID x 2 weeks, if fails to resolve consider biopsy to rule out SCC/HAK  6. Scar, right posterior shoulder, s/p bx 9/11/18 to rule out superficial BCC  7. Relevant medical history:  Breast cancer s/p surgery, chemo, radiation. Now in remission.  8. Itchy rash on chin with secondary impetiginization:  -S/p mupirocin with resolution of crusting  -current tx: hydrocortisone 2.5% ointment BID to spot treat until resolved    Encounter Date: Aug 15, 2019    CC:  Chief Complaint   Patient presents with     Lesion     chin     History of Present Illness:  Ms. Michaela Dorman is a 68 year old female with a history of NMSC and AKs who presents for a spot check on the chin. The patient was last seen on 1/22/19 by Dr. Castle when she started hydrocortisone 2.5% cream for eczematous dermatitis on the chin. Today she reports that the spot is not good. Always itches and burns, wakes her up in the middle of the night. Heals and then comes back. History of picking due to burning and itching. The hydrocortisone cream does not help.       Past Medical History:   Patient Active Problem List   Diagnosis     Enthesopathy of hip region     Family history of stroke (cerebrovascular)     Trochanteric bursitis     Advanced  directives, counseling/discussion     Health Care Home     Hypertension goal BP (blood pressure) < 140/90     Baker's cyst of knee     Patella-femoral syndrome     Adjustment disorder with depressed mood     Essential hypertension     Malignant neoplasm of upper-outer quadrant of left female breast (H)     Encounter for antineoplastic chemotherapy     S/P bilateral mastectomy     Anxiety     Hyperlipidemia LDL goal <130     S/P reverse total shoulder arthroplasty, right     Prophylactic antibiotic for dental procedure indicated due to prior joint replacement     Chronic pain syndrome     Lumbar radiculopathy     Foraminal stenosis of lumbar region     Central stenosis of spinal canal     DDD (degenerative disc disease), lumbar     Past Medical History:   Diagnosis Date     Central stenosis of spinal canal 12/1/2017    lumbar      DDD (degenerative disc disease), lumbar 12/1/2017     Depressive disorder, not elsewhere classified      Esophageal reflux      ETOH abuse     in remission     Foraminal stenosis of lumbar region 12/1/2017    Complete lumbar spine left at various degrees and to a lesser extent the right as well.     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     Subdural hematoma (H)      Past Surgical History:   Procedure Laterality Date     ARTHROPLASTY SHOULDER Right 11/17/2015     ARTHROSCOPY KNEE  6/7/2012    Procedure:ARTHROSCOPY KNEE; right knee arthroscopy with partial lateral menisectomy; Surgeon:DAMIÁN MCALLISTER; Location: OR     C LIGATE FALLOPIAN TUBE       C NONSPECIFIC PROCEDURE  1956    ankle fracture surgery     COLONOSCOPY N/A 12/22/2017    Procedure: COLONOSCOPY;  colonoscopy;  Surgeon: Chuck Chand MD;  Location:  GI     EXCISE MASS AXILLA Left 9/16/2016    Procedure: EXCISE MASS AXILLA;  Surgeon: Keyon Blanco MD;  Location:   OR     HC REMV CATARACT EXTRACAP,INSERT LENS  6/25/2009    Right eye     INJECT EPIDURAL LUMBAR N/A 4/11/2019    Procedure: interlaminar epidual steroid injection lumbar 4-5;  Surgeon: Oskar Salvador MD;  Location: [facility]     INSERT PORT VASCULAR ACCESS Right 10/7/2016    Procedure: INSERT PORT VASCULAR ACCESS;  Surgeon: Keyon Blanco MD;  Location: [facility] OR     MASTECTOMY SIMPLE BILATERAL Bilateral 2/8/2017    Procedure: MASTECTOMY SIMPLE BILATERAL;  Surgeon: Keyon Blanco MD;  [facility]: [facility] OR     OPEN REDUCTION INTERNAL FIXATION FOOT Right 3/20/2015    Procedure: OPEN REDUCTION INTERNAL FIXATION FOOT;  Surgeon: Javi Herrmann DPM;  Location: [facility]     REMOVE PORT VASCULAR ACCESS Right 2/14/2018    Procedure: REMOVE PORT VASCULAR ACCESS;  right intra jugular port removal;  Surgeon: Keyon Blanco MD;  Location: [facility]     SHOULDER SURGERY Right 11/2015       Social History:   reports that she quit smoking about 39 years ago. Her smoking use included cigarettes. She has a 30.00 pack-year smoking history. She has never used smokeless tobacco. She reports that she drinks alcohol. She reports that she does not use drugs. Patient rides horses for fun.    Family History:  Family History   Problem Relation Age of Onset     Hypertension Mother      Thyroid Disease Mother      Cerebrovascular Disease Mother      Hypertension Father      Cerebrovascular Disease Father      Cancer Father         skin     Thyroid Disease Sister      Diabetes Brother         type 2     Depression Sister      Breast Cancer Sister         age 47     Cancer Sister         skin     Cancer Brother         inside nose       Medications:  Current Outpatient Medications   Medication Sig Dispense Refill     acetaminophen (TYLENOL EX ST ARTHRITIS PAIN) 500 MG tablet Take 500-1,000 mg by mouth every 6 hours as needed for mild pain       ALPRAZolam (XANAX) 0.5 MG tablet Take 1 tablet (0.5 mg) by mouth 3 times daily as needed for anxiety No  "further refills without office visit. 30 tablet 0     atorvastatin (LIPITOR) 20 MG tablet TAKE ONE TABLET BY MOUTH EVERY DAY 30 tablet 0     escitalopram (LEXAPRO) 20 MG tablet TAKE ONE TABLET BY MOUTH ONCE DAILY 90 tablet 1     hydrochlorothiazide (HYDRODIURIL) 25 MG tablet TAKE ONE TABLET BY MOUTH EVERY DAY 90 tablet 1     hydrocortisone 2.5 % ointment Apply twice daily to chin as needed for itch/rash. Use for up to two weeks. 20 g 0     Lansoprazole (PREVACID PO) Take 40 mg by mouth       NONFORMULARY        oxyCODONE-acetaminophen (PERCOCET) 5-325 MG tablet Take 1-2 tablets by mouth every 6 hours as needed for moderate to severe pain (#30 to last 30 days) 30 tablet 0     prochlorperazine (COMPAZINE) 10 MG tablet TAKE ONE TABLET BY MOUTH EVERY 6 HOURS AS NEEDED (NAUSEA/VOMITING) 30 tablet 3     sulfamethoxazole-trimethoprim (BACTRIM DS/SEPTRA DS) 800-160 MG tablet Take 1 tablet by mouth 2 times daily 20 tablet 0     tamsulosin (FLOMAX) 0.4 MG capsule Take 2 capsules (0.8 mg) by mouth daily 180 capsule 1       Allergies   Allergen Reactions     No Known Drug Allergies        Review of Systems:  -Constitutional:  Feeling well, in usual state of health.  -Skin:  As per HPI, no additional concerns.    Physical exam:  Vitals: There were no vitals taken for this visit.  GEN: This is a well developed, well-nourished female in no acute distress, in a pleasant mood.    SKIN: Focused examination of the face was performed.   - pink papule, central excoriation, no telangiectasias.   - No other lesions of concern on areas examined.       Impression/Plan:  1.   History of NMSC      Due for TBSE-counseled on needa dn will scheduled    2.   History of AKs - not addressed today     3.   Excoriated BLK or prurigo nodule on the chin, s/p biopsy , pt reports she is picking- \"excoriated benign hypertrophic keratosis with base of lesion incompletely visualized in background of marked solar elastosis\"    Restart hydrocortisone 2.5% " ointment - 2 weeks on, 2 weeks off. Reviewed need for breaks  Kenalog intralesional injection procedure note: After verbal consent and discussion of risks including but not limited to atrophy, pain  time out was performed, the patient underwent positioning and the area was prepped with isopropyl alcohol, 0.1 total cc of Kenalog 2.5 mg/cc was injected into 1 site on the chin.  The patient tolerated the procedure well and left the Dermatology clinic in good condition.    Recommend to stop picking      Follow-up within 6 months for spot check and skin exam    Staff Involved:  Scribe/Staff    Scribe Disclosure  I, Xena Luna, am serving as a scribe to document services personally performed by Dr. Loren Felipe MD, based on data collection and the provider's statements to me.     Provider Disclosure:   The documentation recorded by the scribe accurately reflects the services I personally performed and the decisions made by me.    Loren Felipe MD    Department of Dermatology  [facility]: Phone: [phone], Fax:[phone]  [facility]: Phone: [phone], Fax: [phone]                  Again, thank you for allowing me to participate in the care of your patient.        Sincerely,        Loren Felipe MD

## 2019-08-15 NOTE — NURSING NOTE
The following medication was given:     MEDICATION: Kenalog 2.5 mg  ROUTE: SQ  SITE: Chin  DOSE: see procedure note   LOT #: [id number]  :  [employer]  EXPIRATION DATE:  Oct. 2020  NDC#: 6191-7392-51   AMOUNT OF MEDICATION GIVEN: see procedure note  AMOUNT OF MEDICATION WASTED: see procedure note    Stephie Calles LPN  August 15, 2019

## 2019-08-15 NOTE — NURSING NOTE
@Michaela Dorman's goals for this visit include:   Chief Complaint   Patient presents with     Lesion     chin       She requests these members of her care team be copied on today's visit information: NO    PCP: Phani Tapia    Referring Provider:  No referring provider defined for this encounter.    There were no vitals taken for this visit.    Do you need any medication refills at today's visit? NO    Carina Mcarthur CMA

## 2019-08-15 NOTE — PROGRESS NOTES
[facility] Dermatology Note      Dermatology Problem List:  1.BCC, left cheek  -s/p Mohs 8/18/2016  2. Actinic keratosis bordering on SCCIS, upper chest (left chest)  -s/p excision 8/29/2011  3. Actinic keratosis  -s/p cryotherapy  -s/p PDT, 11/21/2018  4. History of lost biopsy specimen from R chest on 6/15/17. Repeat biopsy 8/2/17 showed only scar.  5. Suspected bug bites leading to prurigo nodules on left dorsal hand  -Treat with lidex 0.5% cream BID x 2 weeks, if fails to resolve consider biopsy to rule out SCC/HAK  6. Scar, right posterior shoulder, s/p bx 9/11/18 to rule out superficial BCC  7. Relevant medical history:  Breast cancer s/p surgery, chemo, radiation. Now in remission.  8. Itchy rash on chin with secondary impetiginization:  -S/p mupirocin with resolution of crusting  -current tx: hydrocortisone 2.5% ointment BID to spot treat until resolved    Encounter Date: Aug 15, 2019    CC:  Chief Complaint   Patient presents with     Lesion     chin     History of Present Illness:  Ms. Michaela Dorman is a 68 year old female with a history of NMSC and AKs who presents for a spot check on the chin. The patient was last seen on 1/22/19 by Dr. Castle when she started hydrocortisone 2.5% cream for eczematous dermatitis on the chin. Today she reports that the spot is not good. Always itches and burns, wakes her up in the middle of the night. Heals and then comes back. History of picking due to burning and itching. The hydrocortisone cream does not help.       Past Medical History:   Patient Active Problem List   Diagnosis     Enthesopathy of hip region     Family history of stroke (cerebrovascular)     Trochanteric bursitis     Advanced directives, counseling/discussion     Health Care Home     Hypertension goal BP (blood pressure) < 140/90     Baker's cyst of knee     Patella-femoral syndrome     Adjustment disorder with depressed mood     Essential hypertension     Malignant neoplasm  of upper-outer quadrant of left female breast (H)     Encounter for antineoplastic chemotherapy     S/P bilateral mastectomy     Anxiety     Hyperlipidemia LDL goal <130     S/P reverse total shoulder arthroplasty, right     Prophylactic antibiotic for dental procedure indicated due to prior joint replacement     Chronic pain syndrome     Lumbar radiculopathy     Foraminal stenosis of lumbar region     Central stenosis of spinal canal     DDD (degenerative disc disease), lumbar     Past Medical History:   Diagnosis Date     Central stenosis of spinal canal 12/1/2017    lumbar      DDD (degenerative disc disease), lumbar 12/1/2017     Depressive disorder, not elsewhere classified      Esophageal reflux      ETOH abuse     in remission     Foraminal stenosis of lumbar region 12/1/2017    Complete lumbar spine left at various degrees and to a lesser extent the right as well.     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     Subdural hematoma (H)      Past Surgical History:   Procedure Laterality Date     ARTHROPLASTY SHOULDER Right 11/17/2015     ARTHROSCOPY KNEE  6/7/2012    Procedure:ARTHROSCOPY KNEE; right knee arthroscopy with partial lateral menisectomy; Surgeon:DAMIÁN MCALLISTER; Location:[facility] OR     C LIGATE FALLOPIAN TUBE       C NONSPECIFIC PROCEDURE  1956    ankle fracture surgery     COLONOSCOPY N/A 12/22/2017    Procedure: COLONOSCOPY;  colonoscopy;  Surgeon: Chuck Chand MD;  Location: [facility]     EXCISE MASS AXILLA Left 9/16/2016    Procedure: EXCISE MASS AXILLA;  Surgeon: Keyon Blanco MD;  [facility]: [facility]     HC REMV CATARACT EXTRACAP,INSERT LENS  6/25/2009    Right eye     INJECT EPIDURAL LUMBAR N/A 4/11/2019    Procedure: interlaminar epidual steroid injection lumbar 4-5;  Surgeon: Oskar Salvador MD;  Location: [facility]     INSERT PORT VASCULAR ACCESS  Right 10/7/2016    Procedure: INSERT PORT VASCULAR ACCESS;  Surgeon: Keyon Blanco MD;  [facility]: [facility]     MASTECTOMY SIMPLE BILATERAL Bilateral 2/8/2017    Procedure: MASTECTOMY SIMPLE BILATERAL;  Surgeon: Keyon Blanco MD;  [facility]: [facility]     OPEN REDUCTION INTERNAL FIXATION FOOT Right 3/20/2015    Procedure: OPEN REDUCTION INTERNAL FIXATION FOOT;  Surgeon: Javi Herrmann DPM;  [facility]     REMOVE PORT VASCULAR ACCESS Right 2/14/2018    Procedure: REMOVE PORT VASCULAR ACCESS;  right intra jugular port removal;  Surgeon: Keyon Blanco MD;  Location: [facility]     SHOULDER SURGERY Right 11/2015       Social History:   reports that she quit smoking about 39 years ago. Her smoking use included cigarettes. She has a 30.00 pack-year smoking history. She has never used smokeless tobacco. She reports that she drinks alcohol. She reports that she does not use drugs. Patient rides horses for fun.    Family History:  Family History   Problem Relation Age of Onset     Hypertension Mother      Thyroid Disease Mother      Cerebrovascular Disease Mother      Hypertension Father      Cerebrovascular Disease Father      Cancer Father         skin     Thyroid Disease Sister      Diabetes Brother         type 2     Depression Sister      Breast Cancer Sister         age 47     Cancer Sister         skin     Cancer Brother         inside nose       Medications:  Current Outpatient Medications   Medication Sig Dispense Refill     acetaminophen (TYLENOL EX ST ARTHRITIS PAIN) 500 MG tablet Take 500-1,000 mg by mouth every 6 hours as needed for mild pain       ALPRAZolam (XANAX) 0.5 MG tablet Take 1 tablet (0.5 mg) by mouth 3 times daily as needed for anxiety No further refills without office visit. 30 tablet 0     atorvastatin (LIPITOR) 20 MG tablet TAKE ONE TABLET BY MOUTH EVERY DAY 30 tablet 0     escitalopram (LEXAPRO) 20 MG tablet TAKE ONE TABLET BY MOUTH ONCE DAILY 90 tablet 1     hydrochlorothiazide  "(HYDRODIURIL) 25 MG tablet TAKE ONE TABLET BY MOUTH EVERY DAY 90 tablet 1     hydrocortisone 2.5 % ointment Apply twice daily to chin as needed for itch/rash. Use for up to two weeks. 20 g 0     Lansoprazole (PREVACID PO) Take 40 mg by mouth       NONFORMULARY        oxyCODONE-acetaminophen (PERCOCET) 5-325 MG tablet Take 1-2 tablets by mouth every 6 hours as needed for moderate to severe pain (#30 to last 30 days) 30 tablet 0     prochlorperazine (COMPAZINE) 10 MG tablet TAKE ONE TABLET BY MOUTH EVERY 6 HOURS AS NEEDED (NAUSEA/VOMITING) 30 tablet 3     sulfamethoxazole-trimethoprim (BACTRIM DS/SEPTRA DS) 800-160 MG tablet Take 1 tablet by mouth 2 times daily 20 tablet 0     tamsulosin (FLOMAX) 0.4 MG capsule Take 2 capsules (0.8 mg) by mouth daily 180 capsule 1       Allergies   Allergen Reactions     No Known Drug Allergies        Review of Systems:  -Constitutional:  Feeling well, in usual state of health.  -Skin:  As per HPI, no additional concerns.    Physical exam:  Vitals: There were no vitals taken for this visit.  GEN: This is a well developed, well-nourished female in no acute distress, in a pleasant mood.    SKIN: Focused examination of the face was performed.   - pink papule, central excoriation, no telangiectasias.   - No other lesions of concern on areas examined.       Impression/Plan:  1.   History of NMSC      Due for TBSE-counseled on needa dn will scheduled    2.   History of AKs - not addressed today     3.   Excoriated BLK or prurigo nodule on the chin, s/p biopsy , pt reports she is picking- \"excoriated benign hypertrophic keratosis with base of lesion incompletely visualized in background of marked solar elastosis\"    Restart hydrocortisone 2.5% ointment - 2 weeks on, 2 weeks off. Reviewed need for breaks  Kenalog intralesional injection procedure note: After verbal consent and discussion of risks including but not limited to atrophy, pain  time out was performed, the patient underwent " positioning and the area was prepped with isopropyl alcohol, 0.1 total cc of Kenalog 2.5 mg/cc was injected into 1 site on the chin.  The patient tolerated the procedure well and left the Dermatology clinic in good condition.    Recommend to stop picking      Follow-up within 6 months for spot check and skin exam    Staff Involved:  Scribe/Staff    Scribe Disclosure  I, Xena Luna, am serving as a scribe to document services personally performed by Dr. Loren Felipe MD, based on data collection and the provider's statements to me.     Provider Disclosure:   The documentation recorded by the scribe accurately reflects the services I personally performed and the decisions made by me.    Loren Felipe MD    Department of Dermatology  [facility]: Phone: [phone], Fax:[phone]  [facility]: Phone: [phone], Fax: [phone]

## 2019-08-25 ENCOUNTER — MYC MEDICAL ADVICE (OUTPATIENT)
Dept: NEUROSURGERY | Facility: CLINIC | Age: 69
End: 2019-08-25

## 2019-08-25 DIAGNOSIS — M54.16 LUMBAR RADICULOPATHY: Primary | ICD-10-CM

## 2019-08-26 NOTE — DISCHARGE INSTRUCTIONS
Soft Tissue Contusion  You have a contusion. This is also called a bruise. There is swelling and some bleeding under the skin. This injury generally takes a few days to a few weeks to heal.  During that time, the bruise will typically change in color from reddish, to purple-blue, to greenish-yellow, then to yellow-brown.  Home care    Elevate the injured area to reduce pain and swelling. As much as possible, sit or lie down with the injured area raised about the level of your heart. This is especially important during the first 48 hours.    Ice the injured area to help reduce pain and swelling. Wrap a cold source (ice pack or ice cubes in a plastic bag) in a thin towel. Apply to the bruised area for 20 minutes every 1 to 2 hours the first day. Continue this 3 to 4 times a day until the pain and swelling goes away.    Unless another medication was prescribed, you can take acetaminophen, ibuprofen, or naproxen to control pain. (If you have chronic liver or kidney disease or ever had a stomach ulcer or GI bleeding, talk with your doctor before using these medicines.)  Follow up  Follow up with your health care provider or our staff as advised. Call if you are not better in 1 to 2 weeks.  When to seek medical advice   Call your health care provider right away if you have any of the following:    Increased pain or swelling    Bruise is on an arm or leg and arm or leg becomes cold, blue, numb or tingly    Signs of infection: Warmth, drainage, or increased redness or pain around the contusion    Inability to move the injured area or body part     Bruise is near your eye and you have problems with your eyesight or eye     Frequent bruising for unknown reasons  Date Last Reviewed: 4/29/2015 2000-2017 [employer]. [address] [place] [address], [place] [address]. All rights reserved. This information is not intended as a substitute for professional medical care. Always follow your healthcare professional's  instructions.        At Risk for Compartment Syndrome  Within the arms and legs, sheets of thick tissue called fascia separate groups of muscles. These sheets of tissue create a confined space, called a compartment. Compartment syndrome occurs when a muscle swells too much within this space. Fascia does not stretch, so it cannot expand. As the muscle swells, it is squeezed tighter and tighter. This slows or stops blood flow in the muscle. Long-term damage can result. In severe cases, the muscle dies, which may result in amputation.     Compartment syndrome occurs when a muscle swells within a  compartment  made by sheets of thick tissue called fascia.   Swelling that leads to compartment syndrome is most often due to injury. This may be from an accident or other trauma, most often one with a lot of force and damage. Repeated, high-intensity exercise can also sometimes lead to compartment syndrome.  If compartment syndrome is suspected, a healthcare provider can measure the pressure within a compartment. A needle is put into the affected area. The needle is attached to a pressure meter. Pressure readings are then taken. If compartment pressure readings become too high, blood flow and nutrient supply to the tissue may be compromised. If your healthcare provider diagnoses compartment syndrome, emergency surgery is often needed to help relieve it before damage becomes permanent.  Due to your injury, you must watch for symptoms of compartment syndrome (see below). Compartment syndrome is a dangerous condition. It can cause death of the muscle in as little as a few hours. Call your healthcare provider and get treatment right away if you have any of the symptoms listed below.  Home care  You may be given medications for swelling and pain. Follow all instructions for taking these medications.  To help reduce swelling:    Apply an ice pack wrapped in a thin towel over the injured area. Do this for 20 minutes every 1 to 2  hours the first day. Continue 3 to 4 times a day until the pain and swelling subside.    Keep the affected arm or leg raised above the level of your heart. This is most vital during the first 48 hours after an injury. Place pillows under the arm or leg when sitting or lying down and at night while sleeping.  Follow-up care  Follow up with your healthcare provider, or as advised.  When to seek medical advice  Call your healthcare provider right away if any of these occur:    Pain that gets worse when you stretch, move, or touch the muscle    Pain that does not respond to pain medications or is getting worse    Pain out of proportion to your injury    Shiny, swollen, pale skin over the injury    Purple foot or hand color past the level of the injury    Decreased sensation in the injured muscle    Unusual weakness of the injured muscle    Inability to move the injured muscle    New or worsening numbness/tingling in the injured extremity  Date Last Reviewed: 1/18/2016 2000-2017 [employer]. [address] [place] [address], [place] [address]. All rights reserved. This information is not intended as a substitute for professional medical care. Always follow your healthcare professional's instructions.         2

## 2019-08-27 ENCOUNTER — TELEPHONE (OUTPATIENT)
Dept: SURGERY | Facility: CLINIC | Age: 69
End: 2019-08-27

## 2019-08-27 NOTE — TELEPHONE ENCOUNTER
patient returned call to schedule VAINNEY  Date: 9/12/19  Time: 400p  Dr. Salvador    Instructed pt to have H&P and  for procedure.

## 2019-08-28 ENCOUNTER — TELEPHONE (OUTPATIENT)
Dept: ONCOLOGY | Facility: CLINIC | Age: 69
End: 2019-08-28

## 2019-08-28 NOTE — TELEPHONE ENCOUNTER
Patient needs to reschedule from 11/19/19 to 11/21/19 due to Dr. Ferguson changing clinic days from Tuesday to Thursday.      LVM for patient to call back to confirm that it is ok to move.    Serena Gonzalez, CMA

## 2019-09-04 NOTE — PROGRESS NOTES
[facility]  [address] [facility] [address]  [phone]  Dept: [phone]    PRE-OP EVALUATION:  Today's date: 2019    Michaela Dorman (: 1950) presents for pre-operative evaluation assessment as requested by Dr. Salvador.  She requires evaluation and anesthesia risk assessment prior to undergoing surgery/procedure for treatment of low back pain.    Fax number for surgical facility:   Primary Physician: Phani Tapia  Type of Anesthesia Anticipated: Local with MAC    Patient has a Health Care Directive or Living Will:  YES at home    Preop Questions 2019   Who is doing your surgery? i dont know   What are you having done? back injection   Date of Surgery/Procedure:    Facility or Hospital where procedure/surgery will be performed: [facility]   1.  Do you have a history of Heart attack, stroke, stent, coronary bypass surgery, or other heart surgery? No   2.  Do you ever have any pain or discomfort in your chest? No   3.  Do you have a history of  Heart Failure? No   4.   Are you troubled by shortness of breath when:  walking on a level surface, or up a slight hill, or at night? No   5.  Do you currently have a cold, bronchitis or other respiratory infection? No   6.  Do you have a cough, shortness of breath, or wheezing? No   7.  Do you sometimes get pains in the calves of your legs when you walk? No   8. Do you or anyone in your family have previous history of blood clots? No   9.  Do you or does anyone in your family have a serious bleeding problem such as prolonged bleeding following surgeries or cuts? No   10. Have you ever had problems with anemia or been told to take iron pills? No   11. Have you had any abnormal blood loss such as black, tarry or bloody stools, or abnormal vaginal bleeding? No   12. Have you ever had a blood transfusion? No   13. Have you or any of your relatives ever had problems with anesthesia? No   14. Do you have sleep  apnea, excessive snoring or daytime drowsiness? No   15. Do you have any prosthetic heart valves? No   16. Do you have prosthetic joints? YES -    17. Is there any chance that you may be pregnant? No         HPI:     HPI related to upcoming procedure: ongoing low back pain      See problem list for active medical problems.  Problems all longstanding and stable, except as noted/documented.  See ROS for pertinent symptoms related to these conditions.      MEDICAL HISTORY:     Patient Active Problem List    Diagnosis Date Noted     Foraminal stenosis of lumbar region 12/01/2017     Priority: Medium     Complete lumbar spine left at various degrees and to a lesser extent the right as well.       Central stenosis of spinal canal 12/01/2017     Priority: Medium     lumbar         DDD (degenerative disc disease), lumbar 12/01/2017     Priority: Medium     Chronic pain syndrome 11/30/2017     Priority: Medium     substancePatient is followed by Phani Jason PA-C for ongoing prescription of pain medication.  All refills should only be approved by this provider, or covering partner.    Medication(s): Oxycodone IR 5mg.   Maximum quantity per month: 20  Clinic visit frequency required: Q 3 months     Controlled substance agreement:  Encounter-Level CSA:     There are no encounter-level csa.        Pain Clinic evaluation in the past: Yes       Date/Location:      DIRE Total Score(s):  No flowsheet data found.    Last [facility] website verification:  none   [email or web address]             Lumbar radiculopathy 11/30/2017     Priority: Medium     S/P reverse total shoulder arthroplasty, right 06/05/2017     Priority: Medium     Prophylactic antibiotic for dental procedure indicated due to prior joint replacement 06/05/2017     Priority: Medium     Hyperlipidemia LDL goal <130 05/30/2017     Priority: Medium     Anxiety 03/29/2017     Priority: Medium     S/P bilateral mastectomy 02/08/2017     Priority: Medium     Encounter  for antineoplastic chemotherapy 10/07/2016     Priority: Medium     Malignant neoplasm of upper-outer quadrant of left female breast (H) 10/06/2016     Priority: Medium     Adjustment disorder with depressed mood 11/11/2015     Priority: Medium     Essential hypertension 11/11/2015     Priority: Medium     Baker's cyst of knee 02/09/2015     Priority: Medium     Patella-femoral syndrome 02/09/2015     Priority: Medium     Hypertension goal BP (blood pressure) < 140/90 10/04/2011     Priority: Medium     Health Care Home 09/29/2011     Priority: Medium     x  DX V65.8 REPLACED WITH 52987 HEALTH CARE HOME (04/08/2013)       Advanced directives, counseling/discussion 08/22/2011     Priority: Medium     Advance Directive Problem List Overview:   Name Relationship Phone    Primary Health Care Agent            Alternative Health Care Agent          Patient states has Advance Directive and will bring in a copy to clinic. 8/22/2011          Trochanteric bursitis 01/06/2009     Priority: Medium     Family history of stroke (cerebrovascular) 03/07/2008     Priority: Medium     Enthesopathy of hip region 01/30/2006     Priority: Medium      Past Medical History:   Diagnosis Date     Central stenosis of spinal canal 12/1/2017    lumbar      DDD (degenerative disc disease), lumbar 12/1/2017     Depressive disorder, not elsewhere classified      Esophageal reflux      ETOH abuse     in remission     Foraminal stenosis of lumbar region 12/1/2017    Complete lumbar spine left at various degrees and to a lesser extent the right as well.     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     Subdural hematoma (H)      Past Surgical History:   Procedure Laterality Date     ARTHROPLASTY SHOULDER Right 11/17/2015     ARTHROSCOPY KNEE  6/7/2012    Procedure:ARTHROSCOPY KNEE; right knee  arthroscopy with partial lateral menisectomy; Surgeon:DAMIÁN MCALLISTER; Location:[facility]     C LIGATE FALLOPIAN TUBE       C NONSPECIFIC PROCEDURE  1956    ankle fracture surgery     COLONOSCOPY N/A 12/22/2017    Procedure: COLONOSCOPY;  colonoscopy;  Surgeon: Chuck Chand MD;  [facility]     EXCISE MASS AXILLA Left 9/16/2016    Procedure: EXCISE MASS AXILLA;  Surgeon: Keyon Blanco MD;  Location: [facility]     HC REMV CATARACT EXTRACAP,INSERT LENS  6/25/2009    Right eye     INJECT EPIDURAL LUMBAR N/A 4/11/2019    Procedure: interlaminar epidual steroid injection lumbar 4-5;  Surgeon: Oskar Salvador MD;  Location[facility]     INSERT PORT VASCULAR ACCESS Right 10/7/2016    Procedure: INSERT PORT VASCULAR ACCESS;  Surgeon: Keyon Blanco MD;  [facility]     MASTECTOMY SIMPLE BILATERAL Bilateral 2/8/2017    Procedure: MASTECTOMY SIMPLE BILATERAL;  Surgeon: Keyon Blanco MD;  [facility]     OPEN REDUCTION INTERNAL FIXATION FOOT Right 3/20/2015    Procedure: OPEN REDUCTION INTERNAL FIXATION FOOT;  Surgeon: Javi Herrmann DPM;  [facility]     REMOVE PORT VASCULAR ACCESS Right 2/14/2018    Procedure: REMOVE PORT VASCULAR ACCESS;  right intra jugular port removal;  Surgeon: Keyon Blanco MD;  [facility]     SHOULDER SURGERY Right 11/2015     Current Outpatient Medications   Medication Sig Dispense Refill     acetaminophen (TYLENOL EX ST ARTHRITIS PAIN) 500 MG tablet Take 500-1,000 mg by mouth every 6 hours as needed for mild pain       ALPRAZolam (XANAX) 0.5 MG tablet Take 1 tablet (0.5 mg) by mouth 3 times daily as needed for anxiety No further refills without office visit. 30 tablet 0     atorvastatin (LIPITOR) 20 MG tablet TAKE ONE TABLET BY MOUTH EVERY DAY 30 tablet 0     escitalopram (LEXAPRO) 20 MG tablet TAKE ONE TABLET BY MOUTH ONCE DAILY 90 tablet 1     hydrochlorothiazide (HYDRODIURIL) 25 MG tablet TAKE ONE TABLET BY MOUTH EVERY DAY 90 tablet 1     hydrocortisone 2.5 %  ointment Apply twice daily to chin as needed for itch/rash. Use for up to two weeks. 20 g 0     Lansoprazole (PREVACID PO) Take 40 mg by mouth       NONFORMULARY        oxyCODONE-acetaminophen (PERCOCET) 5-325 MG tablet Take 1-2 tablets by mouth every 6 hours as needed for moderate to severe pain (#30 to last 30 days) 30 tablet 0     prochlorperazine (COMPAZINE) 10 MG tablet TAKE ONE TABLET BY MOUTH EVERY 6 HOURS AS NEEDED (NAUSEA/VOMITING) 30 tablet 3     sulfamethoxazole-trimethoprim (BACTRIM DS/SEPTRA DS) 800-160 MG tablet Take 1 tablet by mouth 2 times daily 20 tablet 0     tamsulosin (FLOMAX) 0.4 MG capsule Take 2 capsules (0.8 mg) by mouth daily 180 capsule 1     OTC products: no recent use of OTC ASA, NSAIDS or Steroids    Allergies   Allergen Reactions     No Known Drug Allergies       Latex Allergy: NO    Social History     Tobacco Use     Smoking status: Former Smoker     Packs/day: 3.00     Years: 10.00     Pack years: 30.00     Types: Cigarettes     Last attempt to quit: 1979     Years since quittin.7     Smokeless tobacco: Never Used     Tobacco comment: approx. 3 packs daily   Substance Use Topics     Alcohol use: Yes     Alcohol/week: 0.0 oz     Comment: daily glass of wine     History   Drug Use No       REVIEW OF SYSTEMS:   CONSTITUTIONAL: NEGATIVE for fever, chills, change in weight  INTEGUMENTARY/SKIN: NEGATIVE for worrisome rashes, moles or lesions  EYES: NEGATIVE for vision changes or irritation  ENT/MOUTH: NEGATIVE for ear, mouth and throat problems  RESP: NEGATIVE for significant cough or SOB  BREAST: NEGATIVE for masses, tenderness or discharge  CV: NEGATIVE for chest pain, palpitations or peripheral edema  GI: NEGATIVE for nausea, abdominal pain, heartburn, or change in bowel habits  : NEGATIVE for frequency, dysuria, or hematuria  MUSCULOSKELETAL: NEGATIVE for significant arthralgias or myalgia  NEURO: NEGATIVE for weakness, dizziness or paresthesias  ENDOCRINE: NEGATIVE for  temperature intolerance, skin/hair changes  HEME: NEGATIVE for bleeding problems  PSYCHIATRIC: NEGATIVE for changes in mood or affect    EXAM:   /54   Pulse 84   Temp 97.9  F (36.6  C) (Temporal)   Resp 16   Wt 63.8 kg (140 lb 11.2 oz)   SpO2 97%   BMI 22.71 kg/m      GENERAL APPEARANCE: healthy, alert and no distress     EYES: EOMI, PERRL     HENT: ear canals and TM's normal and nose and mouth without ulcers or lesions     NECK: no adenopathy, no asymmetry, masses, or scars and thyroid normal to palpation     RESP: lungs clear to auscultation - no rales, rhonchi or wheezes     CV: regular rates and rhythm, normal S1 S2, no S3 or S4 and no murmur, click or rub     ABDOMEN:  soft, nontender, no HSM or masses and bowel sounds normal     MS: extremities normal- no gross deformities noted, no evidence of inflammation in joints, FROM in all extremities.  She does have tenderness over the plantars aspect of the right foot and between toes 3 and 4 today at the base of the toes consistent with a Magallanes's neuroma in my opinion.     SKIN: no suspicious lesions or rashes     NEURO: Normal strength and tone, sensory exam grossly normal, mentation intact and speech normal deep tendon reflex is less on the right lower extremity than on the left.     PSYCH: mentation appears normal. and affect normal/bright     LYMPHATICS: No cervical adenopathy    DIAGNOSTICS:   EKG: appears normal, NSR, normal axis, normal intervals, no acute ST/T changes c/w ischemia, no LVH by voltage criteria, occasional PVC noted, unifocal, unchanged from previous tracings    Recent Labs   Lab Test 05/10/19  0913 03/25/19  0851 11/15/18  0953  02/12/18  0917  01/24/17  1016   HGB 12.0  --  12.8   < > 12.1   < > 9.6*     --  268   < > 262   < > 315   INR  --   --   --   --  0.88  --   --     138 141   < > 138   < > 138   POTASSIUM 4.0 4.4 3.8   < > 3.9   < > 3.9   CR 1.45* 1.07* 1.17*   < > 0.87   < > 0.92   A1C  --   --   --   --    --   --  5.0    < > = values in this interval not displayed.        IMPRESSION:   Reason for surgery/procedure: Intervention related to low back pain with radiculopathy down the right leg.  Diagnosis/reason for consult: Anesthesia/surgical clearance.    The proposed surgical procedure is considered INTERMEDIATE risk.    REVISED CARDIAC RISK INDEX  The patient has the following serious cardiovascular risks for perioperative complications such as (MI, PE, VFib and 3  AV Block):  No serious cardiac risks  INTERPRETATION: 1 risks: Class II (low risk - 0.9% complication rate)    The patient has the following additional risks for perioperative complications:  No identified additional risks  The ASCVD Risk score (Nadira GIRON Jr., et al., 2013) failed to calculate for the following reasons:    The valid HDL cholesterol range is 20 to 100 mg/dL      ICD-10-CM    1. Preop general physical exam Z01.818 EKG 12-lead complete w/read - Clinics   2. Lumbar radiculopathy M54.16 EKG 12-lead complete w/read - Clinics   3. DDD (degenerative disc disease), lumbar M51.36 oxyCODONE-acetaminophen (PERCOCET) 5-325 MG tablet     EKG 12-lead complete w/read - Clinics   4. Foraminal stenosis of lumbar region M99.83 EKG 12-lead complete w/read - Clinics   5. Chronic pain syndrome G89.4 oxyCODONE-acetaminophen (PERCOCET) 5-325 MG tablet     EKG 12-lead complete w/read - Clinics   6. Hypertension goal BP (blood pressure) < 140/90 I10    7. CKD (chronic kidney disease) stage 3, GFR 30-59 ml/min (H) N18.3    8. 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 Adjustment disorder with depressed mood F43.21 ALPRAZolam (XANAX) 0.5 MG tablet   10. Anxiety F41.9 ALPRAZolam (XANAX) 0.5 MG tablet   11. Hyperlipidemia LDL goal <130 E78.5 atorvastatin (LIPITOR) 20 MG tablet   12. Essential hypertension I10 hydrochlorothiazide (HYDRODIURIL) 25 MG tablet   13. Magallanes's neuroma of right foot G57.61 PODIATRY/FOOT & ANKLE  SURGERY REFERRAL       RECOMMENDATIONS:     --Patient is to take all scheduled medications on the day of surgery EXCEPT for modifications listed below.    APPROVAL GIVEN to proceed with proposed procedure, without further diagnostic evaluation       Signed Electronically by: BETHANY Howell PA-C    Copy of this evaluation report is provided to requesting physician.    [facility] Preop Guidelines    Revised Cardiac Risk Index

## 2019-09-09 ENCOUNTER — OFFICE VISIT (OUTPATIENT)
Dept: FAMILY MEDICINE | Facility: OTHER | Age: 69
End: 2019-09-09
Payer: COMMERCIAL

## 2019-09-09 VITALS
WEIGHT: 140.7 LBS | OXYGEN SATURATION: 97 % | HEART RATE: 84 BPM | RESPIRATION RATE: 16 BRPM | SYSTOLIC BLOOD PRESSURE: 116 MMHG | BODY MASS INDEX: 22.71 KG/M2 | DIASTOLIC BLOOD PRESSURE: 54 MMHG | TEMPERATURE: 97.9 F

## 2019-09-09 DIAGNOSIS — F43.21 ADJUSTMENT DISORDER WITH DEPRESSED MOOD: ICD-10-CM

## 2019-09-09 DIAGNOSIS — M51.369 DDD (DEGENERATIVE DISC DISEASE), LUMBAR: ICD-10-CM

## 2019-09-09 DIAGNOSIS — Z01.818 PREOP GENERAL PHYSICAL EXAM: Primary | ICD-10-CM

## 2019-09-09 DIAGNOSIS — I10 HYPERTENSION GOAL BP (BLOOD PRESSURE) < 140/90: ICD-10-CM

## 2019-09-09 DIAGNOSIS — M48.061 FORAMINAL STENOSIS OF LUMBAR REGION: ICD-10-CM

## 2019-09-09 DIAGNOSIS — E78.5 HYPERLIPIDEMIA LDL GOAL <130: ICD-10-CM

## 2019-09-09 DIAGNOSIS — G57.61 MORTON'S NEUROMA OF RIGHT FOOT: ICD-10-CM

## 2019-09-09 DIAGNOSIS — F41.9 ANXIETY: ICD-10-CM

## 2019-09-09 DIAGNOSIS — N18.30 CKD (CHRONIC KIDNEY DISEASE) STAGE 3, GFR 30-59 ML/MIN (H): ICD-10-CM

## 2019-09-09 DIAGNOSIS — G89.4 CHRONIC PAIN SYNDROME: ICD-10-CM

## 2019-09-09 DIAGNOSIS — M54.16 LUMBAR RADICULOPATHY: ICD-10-CM

## 2019-09-09 DIAGNOSIS — C77.5 SECONDARY AND UNSPECIFIED MALIGNANT NEOPLASM OF INTRAPELVIC LYMPH NODES (H): ICD-10-CM

## 2019-09-09 DIAGNOSIS — I10 ESSENTIAL HYPERTENSION: ICD-10-CM

## 2019-09-09 PROCEDURE — 93000 ELECTROCARDIOGRAM COMPLETE: CPT | Performed by: PHYSICIAN ASSISTANT

## 2019-09-09 PROCEDURE — 99214 OFFICE O/P EST MOD 30 MIN: CPT | Performed by: PHYSICIAN ASSISTANT

## 2019-09-09 RX ORDER — OXYCODONE AND ACETAMINOPHEN 5; 325 MG/1; MG/1
1-2 TABLET ORAL EVERY 6 HOURS PRN
Qty: 30 TABLET | Refills: 0 | Status: SHIPPED | OUTPATIENT
Start: 2019-09-09 | End: 2019-10-25

## 2019-09-09 RX ORDER — ALPRAZOLAM 0.5 MG
0.5 TABLET ORAL 3 TIMES DAILY PRN
Qty: 30 TABLET | Refills: 0 | Status: SHIPPED | OUTPATIENT
Start: 2019-09-09 | End: 2020-01-03

## 2019-09-09 RX ORDER — HYDROCHLOROTHIAZIDE 25 MG/1
25 TABLET ORAL DAILY
Qty: 90 TABLET | Refills: 1 | Status: SHIPPED | OUTPATIENT
Start: 2019-09-09 | End: 2020-01-23

## 2019-09-09 RX ORDER — ATORVASTATIN CALCIUM 20 MG/1
20 TABLET, FILM COATED ORAL DAILY
Qty: 90 TABLET | Refills: 1 | Status: SHIPPED | OUTPATIENT
Start: 2019-09-09 | End: 2020-01-23

## 2019-09-09 ASSESSMENT — PAIN SCALES - GENERAL: PAINLEVEL: NO PAIN (0)

## 2019-09-11 ENCOUNTER — ANESTHESIA EVENT (OUTPATIENT)
Dept: SURGERY | Facility: CLINIC | Age: 69
End: 2019-09-11
Payer: MEDICARE

## 2019-09-11 ASSESSMENT — LIFESTYLE VARIABLES: TOBACCO_USE: 1

## 2019-09-12 ENCOUNTER — HOSPITAL ENCOUNTER (OUTPATIENT)
Facility: CLINIC | Age: 69
Discharge: HOME | End: 2019-09-12
Attending: ANESTHESIOLOGY | Admitting: ANESTHESIOLOGY
Payer: MEDICARE

## 2019-09-12 ENCOUNTER — HOSPITAL ENCOUNTER (OUTPATIENT)
Dept: GENERAL RADIOLOGY | Facility: CLINIC | Age: 69
End: 2019-09-12
Attending: ANESTHESIOLOGY | Admitting: ANESTHESIOLOGY
Payer: MEDICARE

## 2019-09-12 ENCOUNTER — ANESTHESIA (OUTPATIENT)
Dept: SURGERY | Facility: CLINIC | Age: 69
End: 2019-09-12
Payer: MEDICARE

## 2019-09-12 VITALS
SYSTOLIC BLOOD PRESSURE: 151 MMHG | HEART RATE: 74 BPM | DIASTOLIC BLOOD PRESSURE: 87 MMHG | OXYGEN SATURATION: 99 % | TEMPERATURE: 97.9 F | RESPIRATION RATE: 16 BRPM

## 2019-09-12 DIAGNOSIS — M54.16 LUMBAR RADICULOPATHY: ICD-10-CM

## 2019-09-12 PROCEDURE — 62323 NJX INTERLAMINAR LMBR/SAC: CPT | Performed by: ANESTHESIOLOGY

## 2019-09-12 PROCEDURE — 40000277 XR SURGERY CARM FLUORO LESS THAN 5 MIN W STILLS: Mod: TC

## 2019-09-12 PROCEDURE — 25000125 ZZHC RX 250: Performed by: NURSE ANESTHETIST, CERTIFIED REGISTERED

## 2019-09-12 PROCEDURE — 37000008 ZZH ANESTHESIA TECHNICAL FEE, 1ST 30 MIN: Performed by: ANESTHESIOLOGY

## 2019-09-12 PROCEDURE — 25000128 H RX IP 250 OP 636: Performed by: ANESTHESIOLOGY

## 2019-09-12 RX ORDER — ONDANSETRON 4 MG/1
4 TABLET, ORALLY DISINTEGRATING ORAL EVERY 30 MIN PRN
Status: CANCELLED | OUTPATIENT
Start: 2019-09-12

## 2019-09-12 RX ORDER — NALOXONE HYDROCHLORIDE 0.4 MG/ML
.1-.4 INJECTION, SOLUTION INTRAMUSCULAR; INTRAVENOUS; SUBCUTANEOUS
Status: CANCELLED | OUTPATIENT
Start: 2019-09-12 | End: 2019-09-13

## 2019-09-12 RX ORDER — SODIUM CHLORIDE, SODIUM LACTATE, POTASSIUM CHLORIDE, CALCIUM CHLORIDE 600; 310; 30; 20 MG/100ML; MG/100ML; MG/100ML; MG/100ML
INJECTION, SOLUTION INTRAVENOUS CONTINUOUS
Status: CANCELLED | OUTPATIENT
Start: 2019-09-12

## 2019-09-12 RX ORDER — ONDANSETRON 2 MG/ML
4 INJECTION INTRAMUSCULAR; INTRAVENOUS EVERY 30 MIN PRN
Status: CANCELLED | OUTPATIENT
Start: 2019-09-12

## 2019-09-12 RX ORDER — MEPERIDINE HYDROCHLORIDE 25 MG/ML
12.5 INJECTION INTRAMUSCULAR; INTRAVENOUS; SUBCUTANEOUS
Status: CANCELLED | OUTPATIENT
Start: 2019-09-12

## 2019-09-12 RX ORDER — TRIAMCINOLONE ACETONIDE 40 MG/ML
INJECTION, SUSPENSION INTRA-ARTICULAR; INTRAMUSCULAR PRN
Status: DISCONTINUED | OUTPATIENT
Start: 2019-09-12 | End: 2019-09-12 | Stop reason: HOSPADM

## 2019-09-12 RX ORDER — IOPAMIDOL 612 MG/ML
INJECTION, SOLUTION INTRATHECAL PRN
Status: DISCONTINUED | OUTPATIENT
Start: 2019-09-12 | End: 2019-09-12 | Stop reason: HOSPADM

## 2019-09-12 RX ADMIN — LIDOCAINE HYDROCHLORIDE 1 ML: 10 INJECTION, SOLUTION EPIDURAL; INFILTRATION; INTRACAUDAL; PERINEURAL at 15:41

## 2019-09-12 NOTE — OP NOTE
CHIEF COMPLAINT: Low back and buttock pain  PROCEDURE: Repeat L4-5 interlaminar epidural steroid injection using fluoroscopic guidance with contrast dye.   PROCEDURE DETAILS: After written informed consent was obtained from the patient, the patient was escorted to the procedure room.  The patient was placed in the prone position.  A  time out  was conducted to verify patient identity, procedure to be performed, side, site, allergies and any special requirements.  The skin over the neck and upper back region were prepped and draped in normal sterile fashion. Fluoroscopy was used to identify the interspace in an AP view and the skin was anesthetized with 2 mL of 1% lidocaine with bicarbonate buffer.  A 20-gauge 3-1/2 inch Tuohy needle was advanced using the loss of resistance technique with preservative free normal saline with fluoroscopic guidance. After negative aspiration for CSF and blood, 1.5 cc of Omnipaque contrast dye was injected revealing the appropriate epidurogram without evidence of intrathecal or intravascular spread. Following this, a 6-mL solution of 40 mg of triamcinolone with 5 cc preservative-free normal saline was slowly injected.  After injection of the medication, as the needle tip was withdrawn, it was flushed with local anesthetic.  The patient was monitored with blood pressure and pulse oximetry machines with the assistance of an RN throughout the procedure.  The patient was alert and responsive to questions throughout the procedure.   The patient tolerated the procedure well and was observed in the post-procedural area.  The patient was dismissed without apparent complications.     DIAGNOSIS:  1.  Multilevel degenerative disc disease with high-grade long-term pain relief from a previous L4-5 epidural injection  PLAN:  1. Performed a repeat L4-5 interlaminar epidural steroid injection.   2. The patient was instructed to follow-up at the [facility] if today's procedure is not  helpful.  Oskar Salvador MD  Diplomate of the American Board of Anesthesiology, Pain Medicine

## 2019-09-12 NOTE — ANESTHESIA PREPROCEDURE EVALUATION
Anesthesia Pre-Procedure Evaluation    Patient: Michaela Dorman   MRN: [id number] : 1950          Preoperative Diagnosis: lumbar radiculopathy    Procedure(s):  lumbar 4-5 epidural interlaminar steroid injection    Past Medical History:   Diagnosis Date     Central stenosis of spinal canal 2017    lumbar      DDD (degenerative disc disease), lumbar 2017     Depressive disorder, not elsewhere classified      Esophageal reflux      ETOH abuse     in remission     Foraminal stenosis of lumbar region 2017    Complete lumbar spine left at various degrees and to a lesser extent the right as well.     Lumbar radiculopathy 2017     Prophylactic antibiotic for dental procedure indicated due to prior joint replacement 2017     S/P reverse total shoulder arthroplasty, right 2017     Subdural hematoma (H)      Past Surgical History:   Procedure Laterality Date     ARTHROPLASTY SHOULDER Right 2015     ARTHROSCOPY KNEE  2012    Procedure:ARTHROSCOPY KNEE; right knee arthroscopy with partial lateral menisectomy; Surgeon:DAMIÁN MCALLISTER; [facility]     C LIGATE FALLOPIAN TUBE       C NONSPECIFIC PROCEDURE      ankle fracture surgery     COLONOSCOPY N/A 2017    Procedure: COLONOSCOPY;  colonoscopy;  Surgeon: Chuck Chand MD;  [facility]     EXCISE MASS AXILLA Left 2016    Procedure: EXCISE MASS AXILLA;  Surgeon: Keyon Blanco MD;  [facility]     HC REMV CATARACT EXTRACAP,INSERT LENS  2009    Right eye     INJECT EPIDURAL LUMBAR N/A 2019    Procedure: interlaminar epidual steroid injection lumbar 4-5;  Surgeon: Oskar Salvador MD;  [facility]     INSERT PORT VASCULAR ACCESS Right 10/7/2016    Procedure: INSERT PORT VASCULAR ACCESS;  Surgeon: Keyon Blanco MD;  [facility]     MASTECTOMY SIMPLE BILATERAL Bilateral 2017    Procedure: MASTECTOMY SIMPLE BILATERAL;  Surgeon: Keyon Blanco MD;  [facility]     OPEN  REDUCTION INTERNAL FIXATION FOOT Right 3/20/2015    Procedure: OPEN REDUCTION INTERNAL FIXATION FOOT;  Surgeon: Javi Herrmann DPM;  [facility]     REMOVE PORT VASCULAR ACCESS Right 2/14/2018    Procedure: REMOVE PORT VASCULAR ACCESS;  right intra jugular port removal;  Surgeon: Keyon Blanco MD;  [facility]     SHOULDER SURGERY Right 11/2015       Anesthesia Evaluation     . Pt has had prior anesthetic. Type: General and MAC    No history of anesthetic complications          ROS/MED HX    ENT/Pulmonary:     (+)ANTON risk factors hypertension, tobacco use, Past use , . .    Neurologic:  - neg neurologic ROS     Cardiovascular:     (+) Dyslipidemia, hypertension----. : . . . :. . Previous cardiac testing date:results:date: results:ECG reviewed date:9-9-19 results:Sinus Rhythm - occasional ectopic ventricular beat    Voltage criteria for LVH  (R(V5) exceeds 2.60 mV)  -Voltage criteria w/o ST/T abnormality may be normal. date: results:          METS/Exercise Tolerance:     Hematologic:  - neg hematologic  ROS       Musculoskeletal: Comment: DDD - neg musculoskeletal ROS      Lower extremity injury: ORIF Foot 2015.   GI/Hepatic:     (+) GERD Asymptomatic on medication,       Renal/Genitourinary:  - ROS Renal section negative   (+) Pt has no history of transplant,       Endo:  - neg endo ROS       Psychiatric:     (+) psychiatric history depression      Infectious Disease:  - neg infectious disease ROS       Malignancy:   (+) Malignancy History of Breast  Breast CA Remission status post Chemo and Surgery.         Other:    (+) No chance of pregnancy C-spine cleared: N/A, H/O Chronic Pain,H/O chronic opiod use , no other significant disability   - neg other ROS                      Physical Exam  Normal systems: cardiovascular, pulmonary and dental    Airway   Mallampati: II  TM distance: >3 FB  Neck ROM: full    Dental     Cardiovascular   Rhythm and rate: regular and normal      Pulmonary    breath  "sounds clear to auscultation            Lab Results   Component Value Date    WBC 5.5 05/10/2019    HGB 12.0 05/10/2019    HCT 37.4 05/10/2019     05/10/2019    SED 9 07/16/2008     05/10/2019    POTASSIUM 4.0 05/10/2019    CHLORIDE 104 05/10/2019    CO2 26 05/10/2019    BUN 23 05/10/2019    CR 1.45 (H) 05/10/2019     (H) 05/10/2019    VANDANA 9.8 05/10/2019    ALBUMIN 4.3 05/10/2019    PROTTOTAL 7.7 05/10/2019    ALT 35 05/10/2019    AST 30 05/10/2019    GGT 43 (H) 10/06/2011    ALKPHOS 47 05/10/2019    BILITOTAL 0.3 05/10/2019    PTT 27 05/14/2008    INR 0.88 02/12/2018    TSH 1.34 01/24/2017       Preop Vitals  BP Readings from Last 3 Encounters:   09/09/19 116/54   05/14/19 114/60   04/11/19 144/87    Pulse Readings from Last 3 Encounters:   09/09/19 84   05/14/19 91   04/11/19 75      Resp Readings from Last 3 Encounters:   09/09/19 16   05/14/19 18   04/11/19 16    SpO2 Readings from Last 3 Encounters:   09/09/19 97%   05/14/19 95%   04/11/19 95%      Temp Readings from Last 1 Encounters:   09/09/19 97.9  F (36.6  C) (Temporal)    Ht Readings from Last 1 Encounters:   05/14/19 1.676 m (5' 6\")      Wt Readings from Last 1 Encounters:   09/09/19 63.8 kg (140 lb 11.2 oz)    Estimated body mass index is 22.71 kg/m  as calculated from the following:    Height as of 5/14/19: 1.676 m (5' 6\").    Weight as of 9/9/19: 63.8 kg (140 lb 11.2 oz).       Anesthesia Plan      History & Physical Review  History and physical reviewed and following examination; no interval change.    ASA Status:  2 .    NPO Status:  > 8 hours    Plan for MAC with Intravenous and Propofol induction. Maintenance will be TIVA.  Reason for MAC:  Deep or markedly invasive procedure (G8)         Postoperative Care      Consents  Anesthetic plan, risks, benefits and alternatives discussed with:  Patient.  Use of blood products discussed: No .   .                 LISA Greene CRNA  "

## 2019-09-12 NOTE — ANESTHESIA CARE TRANSFER NOTE
Patient: Michaela Dorman    Procedure(s):  lumbar 4-5 epidural interlaminar steroid injection    Diagnosis: lumbar radiculopathy  Diagnosis Additional Information: No value filed.    Anesthesia Type:   MAC     Note:  Airway :Room Air  Patient transferred to:Phase II  Handoff Report: Identifed the Patient, Identified the Reponsible Provider, Reviewed the pertinent medical history, Discussed the surgical course, Reviewed Intra-OP anesthesia mangement and issues during anesthesia, Set expectations for post-procedure period and Allowed opportunity for questions and acknowledgement of understanding      Vitals: (Last set prior to Anesthesia Care Transfer)    CRNA VITALS  9/12/2019 1545 - 9/12/2019 1624      9/12/2019             Resp Rate (observed):  4  (Abnormal)                 Electronically Signed By: LISA Greene CRNA  September 12, 2019  4:24 PM

## 2019-09-12 NOTE — ANESTHESIA POSTPROCEDURE EVALUATION
Patient: Michaela Dorman    Procedure(s):  lumbar 4-5 epidural interlaminar steroid injection    Diagnosis:lumbar radiculopathy  Diagnosis Additional Information: No value filed.    Anesthesia Type:  MAC    Note:  Anesthesia Post Evaluation    Patient location during evaluation: Phase 2 and Bedside  Patient participation: Able to fully participate in evaluation  Level of consciousness: awake and alert  Pain management: adequate  Airway patency: patent  Cardiovascular status: acceptable  Respiratory status: acceptable  Hydration status: acceptable  PONV: none     Anesthetic complications: None          Last vitals:  Vitals:    09/12/19 1533   BP: (!) 162/88   Resp: 16   Temp: 97.9  F (36.6  C)   SpO2: 97%         Electronically Signed By: LISA Greene CRNA  September 12, 2019  4:25 PM

## 2019-09-12 NOTE — DISCHARGE INSTRUCTIONS
Home Care Instructions                Procedure:  Epidural Steroid Injection or Joint injection    Activity:    Rest today    Do not work today    Resume normal activity tomorrow    Pain:    You may experience soreness at the injection site for one or two days    You may use an ice pack for 20 minutes every 2 hours for the first 24 hours    You may use a heating pad after the first 24 hours    You may use Tylenol  (acetaminophen) every 4 hours or other pain medicines as directed by your physician    Safety  Sedation medicine, if given may remain active for many hours.    It is important for the next 24 hours that you do not:    Drive a car    Operate machines or power tools    Consume alcohol, including beer    Sign any important papers or legal documents    You may experience numbness radiating into your legs or arms, (depending on the procedure location)  This numbness may last several hours.  Until the numb sensation returns to normal please use caution in walking, climbing stairs, stepping out of your vehicle, etc.    Common side effects of steroids:  Not everyone will experience corticosteroid side effects. If side effects are experienced they will gradually subside in the 7-10 day period following an injection.    Most common side effects include:    Flushed face and/or chest    Feeling of warmth, particularly in face but could be overall feeling of warmth    Increased blood sugar in diabetic patients    Menstrual irregularities may occur.  If taking hormone based birth control an alternate method of birth control is recommended    Sleep disturbances and/or mood swings are possible    Leg cramps    Please contact us if you have:  Severe pain   Fever more than 101.5 degrees Fahrenheit  Signs of infection (redness, swelling or drainage)      If you have questions during normal business hours (8am-5pm Monday-Friday) contact the [facility] at [phone]. If you need help after hours, we recommend that  you go to a hospital emergency room or dial 911.       [facility]   Adult Discharge Orders & Instructions     For 24 hours after surgery    1. Get plenty of rest.  A responsible adult must stay with you for at least 24 hours after you leave the hospital.   2. Do not drive or use heavy equipment.  If you have weakness or tingling, don't drive or use heavy equipment until this feeling goes away.  3. Do not drink alcohol.  4. Avoid strenuous or risky activities.  Ask for help when climbing stairs.   5. You may feel lightheaded.  IF so, sit for a few minutes before standing.  Have someone help you get up.   6. If you have nausea (feel sick to your stomach): Drink only clear liquids such as apple juice, ginger ale, broth or 7-Up.  Rest may also help.  Be sure to drink enough fluids.  Move to a regular diet as you feel able.  7. You may have a slight fever. Call the doctor if your fever is over 100 F (37.7 C) (taken under the tongue) or lasts longer than 24 hours.  8. You may have a dry mouth, a sore throat, muscle aches or trouble sleeping.  These should go away after 24 hours.  9. Do not make important or legal decisions.

## 2019-09-17 DIAGNOSIS — Z51.11 ENCOUNTER FOR ANTINEOPLASTIC CHEMOTHERAPY: ICD-10-CM

## 2019-09-17 RX ORDER — PROCHLORPERAZINE MALEATE 10 MG
TABLET ORAL
Qty: 30 TABLET | Refills: 3 | Status: SHIPPED | OUTPATIENT
Start: 2019-09-17 | End: 2020-05-26

## 2019-09-17 NOTE — TELEPHONE ENCOUNTER
Prescription approved per [facility] Refill Protocol.  Larry Meadows, RN, BSN

## 2019-09-18 ENCOUNTER — OFFICE VISIT (OUTPATIENT)
Dept: PODIATRY | Facility: OTHER | Age: 69
End: 2019-09-18
Payer: COMMERCIAL

## 2019-09-18 ENCOUNTER — ANCILLARY PROCEDURE (OUTPATIENT)
Dept: GENERAL RADIOLOGY | Facility: OTHER | Age: 69
End: 2019-09-18
Attending: PODIATRIST
Payer: COMMERCIAL

## 2019-09-18 VITALS
SYSTOLIC BLOOD PRESSURE: 122 MMHG | HEIGHT: 66 IN | BODY MASS INDEX: 22.5 KG/M2 | WEIGHT: 140 LBS | DIASTOLIC BLOOD PRESSURE: 68 MMHG

## 2019-09-18 DIAGNOSIS — M79.671 RIGHT FOOT PAIN: ICD-10-CM

## 2019-09-18 DIAGNOSIS — M77.9 CAPSULITIS: Primary | ICD-10-CM

## 2019-09-18 DIAGNOSIS — Q66.6 PES VALGUS OF RIGHT FOOT: ICD-10-CM

## 2019-09-18 PROCEDURE — 99213 OFFICE O/P EST LOW 20 MIN: CPT | Performed by: PODIATRIST

## 2019-09-18 PROCEDURE — 73630 X-RAY EXAM OF FOOT: CPT | Mod: RT

## 2019-09-18 ASSESSMENT — MIFFLIN-ST. JEOR: SCORE: 1176.79

## 2019-09-18 ASSESSMENT — PAIN SCALES - GENERAL: PAINLEVEL: MILD PAIN (2)

## 2019-09-18 NOTE — PATIENT INSTRUCTIONS
Reliable shoe stores: To maximize your experience and provide the best possible fit.  Be sure to show them your foot concerns and tell them Dr. Herrmann sent you.      Stores listed in bold have only athletic shoes, and stores that are not bold are mostly casual or variety of shoes    [employer]  [address] [place] [address]  [phone]    [employer] [place]  [address] [place] [address]  [phone]     [employer] [facility]  [address] [place] [address]  [phone]    [employer]  [address] [facility]  [facility] [address]  [phone]    [employer]  [address] [place], [place] [address] [phone]    [employer]  [address] [place] [address]  [phone]                         [employer] Locations:     [address] [place] [address]   [phone]     [address] [place] [address] [address] [place] [address]   [phone]     [address] [place] [address]   [phone]     [address] [facility] [place] [address]   [phone]     [address] [place] [address]   [phone]     [address] [place] [address]   [phone]     [address]   [place] [address]   [phone]     [address] [facility]   [place] [address]   [phone]             How to find reasonable shoes          The correct width    Correct Fitting    Correct Length      Foot Distortion    Posture Distortion                          Torsional Rigidity      Grasp behind the heel and underneath the foot and twist      Bad    Excessive torsion/twist in midfoot     Less torsion/twist in midfoot is better                   Heel Counter Rigidity      Grasp just above   midsole and squeeze      Bad    Soft heel counter      Good    Rigid Heel Counter      Flexion Rigidity      Grasp shoe and bend from forefoot to rearfoot

## 2019-09-18 NOTE — PROGRESS NOTES
HPI:  Michaela Dorman is a 68 year old female who is seen in consultation at the request of Phani Jason PA-C.    Pt presents for eval of:   (Onset, Location, L/R, Character, Treatments, Injury if yes)    XR Right foot today 9/18/2019    DOS 3/20/2015 - open reduction internal fixation right fifth metatarsal fracture     Ongoing plantar base Right 2nd/3rd toe. Presents today with unsupportive slip on shoes.  Constant, dull ache, Intermittent, sharp, throbbing, pain 2-8 worse barefoot walking.    Onset Spring 2019, callus plantar Right 5th met head.    Retired works with horses.    Weight management plan Patient was referred to their PCP to discuss a diet and exercise plan.     Patient to follow up with Primary Care provider regarding elevated blood pressure.    ROS:  10 point ROS neg other than the symptoms noted above in the HPI.    Patient Active Problem List   Diagnosis     Enthesopathy of hip region     Family history of stroke (cerebrovascular)     Trochanteric bursitis     Advanced directives, counseling/discussion     Health Care Home     Hypertension goal BP (blood pressure) < 140/90     Baker's cyst of knee     Patella-femoral syndrome     Adjustment disorder with depressed mood     Essential hypertension     Malignant neoplasm of upper-outer quadrant of left female breast (H)     Encounter for antineoplastic chemotherapy     S/P bilateral mastectomy     Anxiety     Hyperlipidemia LDL goal <130     S/P reverse total shoulder arthroplasty, right     Prophylactic antibiotic for dental procedure indicated due to prior joint replacement     Chronic pain syndrome     Lumbar radiculopathy     Foraminal stenosis of lumbar region     Central stenosis of spinal canal     DDD (degenerative disc disease), lumbar     CKD (chronic kidney disease) stage 3, GFR 30-59 ml/min (H)     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     Subdural hematoma (H)         PAST SURGICAL HISTORY:   Past Surgical History:   Procedure Laterality Date     ARTHROPLASTY SHOULDER Right 11/17/2015     ARTHROSCOPY KNEE  6/7/2012    Procedure:ARTHROSCOPY KNEE; right knee arthroscopy with partial lateral menisectomy; Surgeon:DAMIÁN MCALLISTER; Location:[facility]     C LIGATE FALLOPIAN TUBE       C NONSPECIFIC PROCEDURE  1956    ankle fracture surgery     COLONOSCOPY N/A 12/22/2017    Procedure: COLONOSCOPY;  colonoscopy;  Surgeon: Chuck Chand MD;  Location: [facility]     EXCISE MASS AXILLA Left 9/16/2016    Procedure: EXCISE MASS AXILLA;  Surgeon: Keyon Blanco MD;  Location: [facility]     HC REMV CATARACT EXTRACAP,INSERT LENS  6/25/2009    Right eye     INJECT EPIDURAL LUMBAR N/A 4/11/2019    Procedure: interlaminar epidual steroid injection lumbar 4-5;  Surgeon: Oskar Salvador MD;  Location: [facility]     INJECT EPIDURAL LUMBAR Bilateral 9/12/2019    Procedure: lumbar 4-5 epidural interlaminar steroid injection;  Surgeon: Oskar Salvador MD;  Location[facility]     INSERT PORT VASCULAR ACCESS Right 10/7/2016    Procedure: INSERT PORT VASCULAR ACCESS;  Surgeon: Keyon Blanco MD;  [facility]     MASTECTOMY SIMPLE BILATERAL Bilateral 2/8/2017    Procedure: MASTECTOMY SIMPLE BILATERAL;  Surgeon: Keyon Blanco MD;  [facility]     OPEN REDUCTION INTERNAL FIXATION FOOT Right  3/20/2015    Procedure: OPEN REDUCTION INTERNAL FIXATION FOOT;  Surgeon: Javi Herrmann DPM;  Location: [facility]     REMOVE PORT VASCULAR ACCESS Right 2/14/2018    Procedure: REMOVE PORT VASCULAR ACCESS;  right intra jugular port removal;  Surgeon: Keyon Blanco MD;  Location: [facility]     SHOULDER SURGERY Right 11/2015        MEDICATIONS:   Current Outpatient Medications:      acetaminophen (TYLENOL EX ST ARTHRITIS PAIN) 500 MG tablet, Take 500-1,000 mg by mouth every 6 hours as needed for mild pain, Disp: , Rfl:      ALPRAZolam (XANAX) 0.5 MG tablet, Take 1 tablet (0.5 mg) by mouth 3 times daily as needed for anxiety No further refills without office visit., Disp: 30 tablet, Rfl: 0     atorvastatin (LIPITOR) 20 MG tablet, Take 1 tablet (20 mg) by mouth daily, Disp: 90 tablet, Rfl: 1     escitalopram (LEXAPRO) 20 MG tablet, TAKE ONE TABLET BY MOUTH ONCE DAILY, Disp: 90 tablet, Rfl: 1     hydrochlorothiazide (HYDRODIURIL) 25 MG tablet, Take 1 tablet (25 mg) by mouth daily, Disp: 90 tablet, Rfl: 1     hydrocortisone 2.5 % ointment, Apply twice daily to chin as needed for itch/rash. Use for up to two weeks., Disp: 20 g, Rfl: 0     Lansoprazole (PREVACID PO), Take 40 mg by mouth, Disp: , Rfl:      NONFORMULARY, , Disp: , Rfl:      oxyCODONE-acetaminophen (PERCOCET) 5-325 MG tablet, Take 1-2 tablets by mouth every 6 hours as needed for moderate to severe pain (#30 to last 30 days), Disp: 30 tablet, Rfl: 0     prochlorperazine (COMPAZINE) 10 MG tablet, TAKE ONE TABLET BY MOUTH EVERY 6 HOURS AS NEEDED (NAUSEA/VOMITING), Disp: 30 tablet, Rfl: 3     tamsulosin (FLOMAX) 0.4 MG capsule, Take 2 capsules (0.8 mg) by mouth daily, Disp: 180 capsule, Rfl: 1     ALLERGIES:    Allergies   Allergen Reactions     No Known Drug Allergies         SOCIAL HISTORY:   Social History     Socioeconomic History     Marital status:      Spouse name: ozzie     Number of children: 5     Years of education: Not on file     Highest  education level: Not on file   Occupational History     Employer: [employer]OMEM[employer]ER   Social Needs     Financial resource strain: Not on file     Food insecurity:     Worry: Not on file     Inability: Not on file     Transportation needs:     Medical: Not on file     Non-medical: Not on file   Tobacco Use     Smoking status: Former Smoker     Packs/day: 3.00     Years: 10.00     Pack years: 30.00     Types: Cigarettes     Last attempt to quit: 1979     Years since quittin.8     Smokeless tobacco: Never Used     Tobacco comment: approx. 3 packs daily   Substance and Sexual Activity     Alcohol use: Yes     Alcohol/week: 0.0 oz     Comment: daily glass of wine     Drug use: No     Sexual activity: Yes     Partners: Male   Lifestyle     Physical activity:     Days per week: Not on file     Minutes per session: Not on file     Stress: Not on file   Relationships     Social connections:     Talks on phone: Not on file     Gets together: Not on file     Attends Anglican service: Not on file     Active member of club or organization: Not on file     Attends meetings of clubs or organizations: Not on file     Relationship status: Not on file     Intimate partner violence:     Fear of current or ex partner: Not on file     Emotionally abused: Not on file     Physically abused: Not on file     Forced sexual activity: Not on file   Other Topics Concern      Service Not Asked     Blood Transfusions Not Asked     Caffeine Concern No     Occupational Exposure Not Asked     Hobby Hazards No     Sleep Concern No     Stress Concern Yes     Weight Concern Not Asked     Special Diet Not Asked     Back Care Not Asked     Exercise Yes     Bike Helmet Not Asked     Seat Belt Yes     Self-Exams Not Asked     Parent/sibling w/ CABG, MI or angioplasty before 65F 55M? Not Asked   Social History Narrative     Not on file        FAMILY HISTORY:   Family History   Problem Relation Age of Onset     Hypertension Mother      Thyroid  "Disease Mother      Cerebrovascular Disease Mother      Hypertension Father      Cerebrovascular Disease Father      Cancer Father         skin     Thyroid Disease Sister      Diabetes Brother         type 2     Depression Sister      Breast Cancer Sister         age 47     Cancer Sister         skin     Cancer Brother         inside nose        EXAM:Vitals: /68 (BP Location: Right arm, Cuff Size: Adult Regular)   Ht 1.676 m (5' 6\")   Wt 63.5 kg (140 lb)   BMI 22.60 kg/m    BMI= Body mass index is 22.6 kg/m .      General appearance: Patient is alert and fully cooperative with history & exam.  No sign of distress is noted during the visit.     Psychiatric: Affect is pleasant & appropriate.  Patient appears motivated to improve health.     Respiratory: Breathing is regular & unlabored while sitting.     HEENT: Hearing is intact to spoken word.  Speech is clear.  No gross evidence of visual impairment that would impact ambulation.     Vascular: DP & PT pulses are intact & regular bilaterally.  No significant edema or varicosities noted.  CFT and skin temperature is normal to both lower extremities.     Neurologic: Lower extremity sensation is intact to light touch.  No evidence of weakness or contracture in the lower extremities.  No evidence of neuropathy.    Dermatologic: Skin is intact to both lower extremities with adequate texture, turgor and tone about the integument.  No paronychia or evidence of soft tissue infection is noted.     Musculoskeletal: Patient is ambulatory without assistive device or brace.  Discomfort is noted with firm palpation about the right second and third metatarsal phalangeal joints.  The second metatarsal is very long compared to first and third.  Minimal palpable induration and negative drawer maneuver of this joint.    Radiographs: 3 views right foot demonstrate abnormal length parabola with long second metatarsal.  No acute cortical reaction.  No medial or lateral deviation.  " No flattening of the joint.     ASSESSMENT:       ICD-10-CM    1. Capsulitis M77.9    2. Pes valgus of right foot Q66.6         PLAN:  [facility] in [facility].      9/18/2019   Obtained and interpreted radiographs  Discussed etiology of capsulitis synovitis overuse of the metatarsal phalangeal joints.  We reviewed the abnormal metatarsal length parabola.  Recommended custom molded orthotics and appropriate shoe gear and discontinue slippers flip-flops slip on's flexible flimsy shoes that she can roll up and put in your pocket.  Recommend she follow-up with me after utilizing the orthotics and reasonable shoe gear.  All questions were answered      Javi Herrmann DPM

## 2019-09-28 ENCOUNTER — HEALTH MAINTENANCE LETTER (OUTPATIENT)
Age: 69
End: 2019-09-28

## 2019-10-10 ENCOUNTER — MYC MEDICAL ADVICE (OUTPATIENT)
Dept: FAMILY MEDICINE | Facility: OTHER | Age: 69
End: 2019-10-10

## 2019-10-10 NOTE — TELEPHONE ENCOUNTER
Left message for patient to return call.  [employer] sent also.    Larry Meadows, RN, BSN

## 2019-10-25 ENCOUNTER — MYC REFILL (OUTPATIENT)
Dept: FAMILY MEDICINE | Facility: OTHER | Age: 69
End: 2019-10-25

## 2019-10-25 DIAGNOSIS — M51.369 DDD (DEGENERATIVE DISC DISEASE), LUMBAR: ICD-10-CM

## 2019-10-25 DIAGNOSIS — G89.4 CHRONIC PAIN SYNDROME: ICD-10-CM

## 2019-10-25 RX ORDER — OXYCODONE AND ACETAMINOPHEN 5; 325 MG/1; MG/1
1-2 TABLET ORAL EVERY 6 HOURS PRN
Qty: 30 TABLET | Refills: 0 | Status: SHIPPED | OUTPATIENT
Start: 2019-10-25 | End: 2019-12-03

## 2019-10-25 NOTE — TELEPHONE ENCOUNTER
Pending Prescriptions:                       Disp   Refills    oxyCODONE-acetaminophen (PERCOCET) 5-325 M*30 tab*0        Sig: Take 1-2 tablets by mouth every 6 hours as needed for           moderate to severe pain (#30 to last 30 days)    Last Written Prescription Date:  9/9/2019  Last Fill Quantity: 30,  # refills: 0   Last office visit: 9/9/2019 with prescribing provider:     Future Office Visit:   Next 5 appointments (look out 90 days)    Nov 21, 2019  1:30 PM CST  [facility] Syeda Ferguson MD  [facility] [address] [place] [address] [id number] [phone]           Routing refill request to provider for review/approval because:  Drug not on the [facility] refill protocol     Natasha Alonzo RN, BSN

## 2019-10-27 ENCOUNTER — HEALTH MAINTENANCE LETTER (OUTPATIENT)
Age: 69
End: 2019-10-27

## 2019-11-14 DIAGNOSIS — C50.412 MALIGNANT NEOPLASM OF UPPER-OUTER QUADRANT OF LEFT FEMALE BREAST, UNSPECIFIED ESTROGEN RECEPTOR STATUS (H): ICD-10-CM

## 2019-11-14 LAB
ALBUMIN SERPL-MCNC: 4.6 G/DL (ref 3.4–5)
ALP SERPL-CCNC: 53 U/L (ref 40–150)
ALT SERPL W P-5'-P-CCNC: 40 U/L (ref 0–50)
ANION GAP SERPL CALCULATED.3IONS-SCNC: 4 MMOL/L (ref 3–14)
AST SERPL W P-5'-P-CCNC: 28 U/L (ref 0–45)
BASOPHILS # BLD AUTO: 0.1 10E9/L (ref 0–0.2)
BASOPHILS NFR BLD AUTO: 0.7 %
BILIRUB SERPL-MCNC: 0.4 MG/DL (ref 0.2–1.3)
BUN SERPL-MCNC: 22 MG/DL (ref 7–30)
CALCIUM SERPL-MCNC: 10 MG/DL (ref 8.5–10.1)
CANCER AG27-29 SERPL-ACNC: 23 U/ML (ref 0–39)
CHLORIDE SERPL-SCNC: 105 MMOL/L (ref 94–109)
CO2 SERPL-SCNC: 30 MMOL/L (ref 20–32)
CREAT SERPL-MCNC: 0.98 MG/DL (ref 0.52–1.04)
DIFFERENTIAL METHOD BLD: ABNORMAL
EOSINOPHIL NFR BLD AUTO: 3.5 %
ERYTHROCYTE [DISTWIDTH] IN BLOOD BY AUTOMATED COUNT: 14.1 % (ref 10–15)
GFR SERPL CREATININE-BSD FRML MDRD: 58 ML/MIN/{1.73_M2}
GLUCOSE SERPL-MCNC: 105 MG/DL (ref 70–99)
HCT VFR BLD AUTO: 37.3 % (ref 35–47)
HGB BLD-MCNC: 12.3 G/DL (ref 11.7–15.7)
IMM GRANULOCYTES # BLD: 0 10E9/L (ref 0–0.4)
IMM GRANULOCYTES NFR BLD: 0.3 %
LYMPHOCYTES # BLD AUTO: 1.7 10E9/L (ref 0.8–5.3)
LYMPHOCYTES NFR BLD AUTO: 18.4 %
MCH RBC QN AUTO: 34.1 PG (ref 26.5–33)
MCHC RBC AUTO-ENTMCNC: 33 G/DL (ref 31.5–36.5)
MCV RBC AUTO: 103 FL (ref 78–100)
MONOCYTES # BLD AUTO: 0.7 10E9/L (ref 0–1.3)
MONOCYTES NFR BLD AUTO: 7.4 %
NEUTROPHILS # BLD AUTO: 6.3 10E9/L (ref 1.6–8.3)
NEUTROPHILS NFR BLD AUTO: 69.7 %
NRBC # BLD AUTO: 0 10*3/UL
NRBC BLD AUTO-RTO: 0 /100
PLATELET # BLD AUTO: 280 10E9/L (ref 150–450)
POTASSIUM SERPL-SCNC: 3.9 MMOL/L (ref 3.4–5.3)
PROT SERPL-MCNC: 7.9 G/DL (ref 6.8–8.8)
RBC # BLD AUTO: 3.61 10E12/L (ref 3.8–5.2)
SODIUM SERPL-SCNC: 139 MMOL/L (ref 133–144)
WBC # BLD AUTO: 9 10E9/L (ref 4–11)

## 2019-11-14 PROCEDURE — 80053 COMPREHEN METABOLIC PANEL: CPT | Performed by: INTERNAL MEDICINE

## 2019-11-14 PROCEDURE — 36415 COLL VENOUS BLD VENIPUNCTURE: CPT | Performed by: INTERNAL MEDICINE

## 2019-11-14 PROCEDURE — 85025 COMPLETE CBC W/AUTO DIFF WBC: CPT | Performed by: INTERNAL MEDICINE

## 2019-11-14 PROCEDURE — 86300 IMMUNOASSAY TUMOR CA 15-3: CPT | Performed by: INTERNAL MEDICINE

## 2019-11-20 NOTE — PROGRESS NOTES
Outpatient Physical Therapy Discharge Note     Patient: Michaela Dorman  : 1950    Beginning/End Dates of Reporting Period:  18 to 10/24/18    Referring Provider: Venkat Shipley PA-C    Therapy Diagnosis: Pain in back, L>R LE,  impaired balance,  decreased strength,  Falls     Client Self Report: Pt reprots she has been feeing good since last session, pain down L leg mostly in mornings or at night.  Sees MD tomorrow.       Goals:  Goal Identifier Standing   Goal Description Pt able to stand for all of the [place] (about 20 mins)   Target Date 18   Date Met      Progress:     Goal Identifier Walking   Goal Description Pt able to walk with out tripping on her foot and with out increased back pain for 3 miles (50 mins)   Target Date 18   Date Met      Progress:     Goal Identifier Falls   Goal Description Pt able to perform FGA better than 23/30,  and Shen balance scale better than 46/42 to allow her to coon hunt with grand kids   Target Date 18   Date Met  10/19/18   Progress:         Progress Toward Goals:   Progress this reporting period:  Good          Plan:  Discharge from therapy.    Discharge:    Reason for Discharge: Patient chooses to discontinue therapy.    Equipment Issued: none    Discharge Plan: Patient to continue home program.

## 2019-11-20 NOTE — ADDENDUM NOTE
Encounter addended by: Sue Iniguez, PT on: 11/20/2019 2:53 PM   Actions taken: Clinical Note Signed, Episode resolved

## 2019-11-21 ENCOUNTER — ONCOLOGY VISIT (OUTPATIENT)
Dept: ONCOLOGY | Facility: CLINIC | Age: 69
End: 2019-11-21
Payer: COMMERCIAL

## 2019-11-21 VITALS
BODY MASS INDEX: 22.48 KG/M2 | TEMPERATURE: 97.2 F | WEIGHT: 139.9 LBS | OXYGEN SATURATION: 99 % | DIASTOLIC BLOOD PRESSURE: 62 MMHG | SYSTOLIC BLOOD PRESSURE: 116 MMHG | HEART RATE: 100 BPM | HEIGHT: 66 IN

## 2019-11-21 DIAGNOSIS — C50.412 MALIGNANT NEOPLASM OF UPPER-OUTER QUADRANT OF LEFT FEMALE BREAST, UNSPECIFIED ESTROGEN RECEPTOR STATUS (H): Primary | ICD-10-CM

## 2019-11-21 DIAGNOSIS — F43.21 ADJUSTMENT DISORDER WITH DEPRESSED MOOD: ICD-10-CM

## 2019-11-21 DIAGNOSIS — I10 ESSENTIAL HYPERTENSION: ICD-10-CM

## 2019-11-21 DIAGNOSIS — C77.5 SECONDARY AND UNSPECIFIED MALIGNANT NEOPLASM OF INTRAPELVIC LYMPH NODES (H): ICD-10-CM

## 2019-11-21 DIAGNOSIS — E78.5 HYPERLIPIDEMIA LDL GOAL <130: ICD-10-CM

## 2019-11-21 PROCEDURE — 99214 OFFICE O/P EST MOD 30 MIN: CPT | Performed by: INTERNAL MEDICINE

## 2019-11-21 ASSESSMENT — MIFFLIN-ST. JEOR: SCORE: 1171.33

## 2019-11-21 NOTE — LETTER
11/21/2019         RE: Michaela Dorman  [address]  [facility] [address]        Dear Colleague,    Thank you for referring your patient, Michaela Dorman, to the [facility]. Please see a copy of my visit note below.    Hematology/ Oncology Follow-up Visit:  Nov 21, 2019    Reason for Visit:   Chief Complaint   Patient presents with     Oncology Clinic Visit     6 month follow up-Malignant neoplasm of upper-outer quadrant of left female breast, unspecified estrogen receptor status      Results       Oncologic History:    Malignant neoplasm of upper-outer quadrant of left female breast (H)  Michaela Dorman  initially felt a mass in her left axilla. She then underwent mammogram which showed dense breasts with no suspicious lesions in 4/2016. She was then seen by surgery and underwent excisional LN biopsy that showed metastatic high-grade poorly differentiated carcinoma with a tumor size of 2.5 cm. Immunohistochemistry was done for ER/MT receptors that came back both negative. HER-2/boubacar was amplified by FISH. She then underwent MRI of the breast on 9/26/2016 that showed suspicious abnormality with multicentric left sided breast cancer that involved the left lateral breast form the nipple to the chest wall. A PET scan was obtained on 10/5/2016 that showed a hypermetabolic opacity in the left axilla wihtout other pathological activity. It was then recommended that she undergo neoadjuvant chemotherapy with Cytoxan and Doxorubicin that will be followed by Taxol, Herceptin. Patient initiated treatment on 10/11/2016.       Interval History:  Patient returning today for follow-up visit.  She has been feeling well without recent complaints weight loss night sweats fever or chills.  She denies any nausea vomiting or diarrhea.  She denies any shortness of breath or cough or wheezing.    Review Of Systems:  Constitutional: Negative for fever, chills, and night sweats.  Skin: negative.  Eyes:  negative.  Ears/Nose/Throat: negative.  Respiratory: No shortness of breath, dyspnea on exertion, cough, or hemoptysis.  Cardiovascular: negative.  Gastrointestinal: negative.  Genitourinary: negative.  Musculoskeletal: negative.  Neurologic: negative.  Psychiatric: negative.  Hematologic/Lymphatic/Immunologic: negative.  Endocrine: negative.    All other ROS negative unless mentioned in interval history.    Past medical, social, surgical, and family histories reviewed.    Allergies:  Allergies as of 11/21/2019 - Reviewed 11/21/2019   Allergen Reaction Noted     No known drug allergies  07/05/2002       Current Medications:  Current Outpatient Medications   Medication Sig Dispense Refill     acetaminophen (TYLENOL EX ST ARTHRITIS PAIN) 500 MG tablet Take 500-1,000 mg by mouth every 6 hours as needed for mild pain       ALPRAZolam (XANAX) 0.5 MG tablet Take 1 tablet (0.5 mg) by mouth 3 times daily as needed for anxiety No further refills without office visit. 30 tablet 0     atorvastatin (LIPITOR) 20 MG tablet Take 1 tablet (20 mg) by mouth daily 90 tablet 1     escitalopram (LEXAPRO) 20 MG tablet TAKE ONE TABLET BY MOUTH ONCE DAILY 90 tablet 1     hydrochlorothiazide (HYDRODIURIL) 25 MG tablet Take 1 tablet (25 mg) by mouth daily 90 tablet 1     Lansoprazole (PREVACID PO) Take 40 mg by mouth       oxyCODONE-acetaminophen (PERCOCET) 5-325 MG tablet Take 1-2 tablets by mouth every 6 hours as needed for moderate to severe pain (#30 to last 30 days) 30 tablet 0     prochlorperazine (COMPAZINE) 10 MG tablet TAKE ONE TABLET BY MOUTH EVERY 6 HOURS AS NEEDED (NAUSEA/VOMITING) 30 tablet 3     tamsulosin (FLOMAX) 0.4 MG capsule Take 2 capsules (0.8 mg) by mouth daily 180 capsule 1     hydrocortisone 2.5 % ointment Apply twice daily to chin as needed for itch/rash. Use for up to two weeks. (Patient not taking: Reported on 11/21/2019) 20 g 0     NONFORMULARY           Physical Exam:  /62   Pulse 100   Temp 97.2  F (36.2  " C) (Oral)   Ht 1.676 m (5' 6\")   Wt 63.5 kg (139 lb 14.4 oz)   SpO2 99%   BMI 22.58 kg/m     Wt Readings from Last 12 Encounters:   11/21/19 63.5 kg (139 lb 14.4 oz)   09/18/19 63.5 kg (140 lb)   09/09/19 63.8 kg (140 lb 11.2 oz)   05/14/19 61.6 kg (135 lb 14.4 oz)   04/08/19 62.3 kg (137 lb 6.4 oz)   03/21/19 62.5 kg (137 lb 12.8 oz)   01/11/19 62.1 kg (136 lb 12.8 oz)   11/20/18 62.2 kg (137 lb 3.2 oz)   10/25/18 62.6 kg (138 lb)   09/07/18 64 kg (141 lb 1.6 oz)   06/05/18 65.1 kg (143 lb 9.6 oz)   05/15/18 63.5 kg (140 lb)     ECOG performance status: 0  GENERAL APPEARANCE: Healthy, alert and in no acute distress.  HEENT: Sclerae anicteric. PERRLA. Oropharynx without ulcers, lesions, or thrush.  NECK: Supple. No asymmetry or masses.  LYMPHATICS: No palpable cervical, supraclavicular, axillary, or inguinal lymphadenopathy.  RESP: Lungs clear to auscultation bilaterally without rales, rhonchi or wheezes.\", BREAST: Scars of bilateral mastectomies \"CARDIOVASCULAR: Regular rate and rhythm. Normal S1, S2; no S3 or S4. No murmur, gallop, or rub.  ABDOMEN: Soft, nontender. Bowel sounds normal. No palpable organomegaly or masses.  MUSCULOSKELETAL: Extremities without gross deformities noted. No edema of bilateral lower extremities.  SKIN: No suspicious lesions or rashes.  NEURO: Alert and oriented x 3. Cranial nerves II-XII grossly intact.  PSYCHIATRIC: Mentation and affect appear normal.    Laboratory/Imaging Studies:  Orders Only on 11/14/2019   Component Date Value Ref Range Status     CA 27-29 11/14/2019 23  0 - 39 U/mL Final    Assay Method:  Chemiluminescence using Siemens Centaur XP     Sodium 11/14/2019 139  133 - 144 mmol/L Final     Potassium 11/14/2019 3.9  3.4 - 5.3 mmol/L Final     Chloride 11/14/2019 105  94 - 109 mmol/L Final     Carbon Dioxide 11/14/2019 30  20 - 32 mmol/L Final     Anion Gap 11/14/2019 4  3 - 14 mmol/L Final     Glucose 11/14/2019 105* 70 - 99 mg/dL Final     Urea Nitrogen 11/14/2019 " 22  7 - 30 mg/dL Final     Creatinine 11/14/2019 0.98  0.52 - 1.04 mg/dL Final     GFR Estimate 11/14/2019 58* >60 mL/min/[1.73_m2] Final    Comment: Non  GFR Calc  Starting 12/18/2018, serum creatinine based estimated GFR (eGFR) will be   calculated using the Chronic Kidney Disease Epidemiology Collaboration   (CKD-EPI) equation.       GFR Estimate If Black 11/14/2019 68  >60 mL/min/[1.73_m2] Final    Comment:  GFR Calc  Starting 12/18/2018, serum creatinine based estimated GFR (eGFR) will be   calculated using the Chronic Kidney Disease Epidemiology Collaboration   (CKD-EPI) equation.       Calcium 11/14/2019 10.0  8.5 - 10.1 mg/dL Final     Bilirubin Total 11/14/2019 0.4  0.2 - 1.3 mg/dL Final     Albumin 11/14/2019 4.6  3.4 - 5.0 g/dL Final     Protein Total 11/14/2019 7.9  6.8 - 8.8 g/dL Final     Alkaline Phosphatase 11/14/2019 53  40 - 150 U/L Final     ALT 11/14/2019 40  0 - 50 U/L Final     AST 11/14/2019 28  0 - 45 U/L Final     WBC 11/14/2019 9.0  4.0 - 11.0 10e9/L Final     RBC Count 11/14/2019 3.61* 3.8 - 5.2 10e12/L Final     Hemoglobin 11/14/2019 12.3  11.7 - 15.7 g/dL Final     Hematocrit 11/14/2019 37.3  35.0 - 47.0 % Final     MCV 11/14/2019 103* 78 - 100 fl Final     MCH 11/14/2019 34.1* 26.5 - 33.0 pg Final     MCHC 11/14/2019 33.0  31.5 - 36.5 g/dL Final     RDW 11/14/2019 14.1  10.0 - 15.0 % Final     Platelet Count 11/14/2019 280  150 - 450 10e9/L Final     Diff Method 11/14/2019 Automated Method   Final     % Neutrophils 11/14/2019 69.7  % Final     % Lymphocytes 11/14/2019 18.4  % Final     % Monocytes 11/14/2019 7.4  % Final     % Eosinophils 11/14/2019 3.5  % Final     % Basophils 11/14/2019 0.7  % Final     % Immature Granulocytes 11/14/2019 0.3  % Final     Nucleated RBCs 11/14/2019 0  0 /100 Final     Absolute Neutrophil 11/14/2019 6.3  1.6 - 8.3 10e9/L Final     Absolute Lymphocytes 11/14/2019 1.7  0.8 - 5.3 10e9/L Final     Absolute Monocytes 11/14/2019  0.7  0.0 - 1.3 10e9/L Final     Absolute Basophils 11/14/2019 0.1  0.0 - 0.2 10e9/L Final     Abs Immature Granulocytes 11/14/2019 0.0  0 - 0.4 10e9/L Final     Absolute Nucleated RBC 11/14/2019 0.0   Final            Assessment and plan:    (C50.412) Malignant neoplasm of upper-outer quadrant of left female breast, unspecified estrogen receptor status (H)  (primary encounter diagnosis)  (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  There is no clinical evidence of breast cancer recurrence.  We will see the patient again in 6 months time or sooner if there are new developments or concerns.    (F43.21) Adjustment disorder with depressed mood  Symptoms of well managed with Lexapro 20 mg orally daily.    (I10) Essential hypertension  Pressure is currently well controlled.  Patient currently on hydrochlorothiazide 25 mg orally daily.    (E78.5) Hyperlipidemia LDL goal <130  Patient currently on atorvastatin 20 mg orally daily.    The patient is ready to learn, no apparent learning barriers were identified.  Diagnosis and treatment plans were explained to the patient. The patient expressed understanding of the content. The patient asked appropriate questions. The patient questions were answered to her satisfaction.    Chart documentation with Dragon Voice recognition Software. Although reviewed after completion, some words and grammatical errors may remain.    Again, thank you for allowing me to participate in the care of your patient.        Sincerely,        Syeda Ferguson MD

## 2019-11-21 NOTE — PATIENT INSTRUCTIONS
Today: Continue with Treatment Plan    Please follow up with Dr. Ferguson in 6 months.  Please schedule Port labs 1 week prior to follow up appointment.    Lab Date/Time:    Office visit follow up with Dr. Ferguson Date/Time:         If you have any questions or concerns please feel free to call.    If you need to reschedule please call:  Clinic or Lab Appointment - [phone]  Infusion - [phone]  Imaging - [phone]    Blanca Subramanian RN  [facility]   Oncology/Hematology Care Coordinator RN  [facility]  [phone]

## 2019-11-21 NOTE — PROGRESS NOTES
Hematology/ Oncology Follow-up Visit:  Nov 21, 2019    Reason for Visit:   Chief Complaint   Patient presents with     Oncology Clinic Visit     6 month follow up-Malignant neoplasm of upper-outer quadrant of left female breast, unspecified estrogen receptor status      Results       Oncologic History:    Malignant neoplasm of upper-outer quadrant of left female breast (H)  Michaela Dorman  initially felt a mass in her left axilla. She then underwent mammogram which showed dense breasts with no suspicious lesions in 4/2016. She was then seen by surgery and underwent excisional LN biopsy that showed metastatic high-grade poorly differentiated carcinoma with a tumor size of 2.5 cm. Immunohistochemistry was done for ER/ID receptors that came back both negative. HER-2/boubacar was amplified by FISH. She then underwent MRI of the breast on 9/26/2016 that showed suspicious abnormality with multicentric left sided breast cancer that involved the left lateral breast form the nipple to the chest wall. A PET scan was obtained on 10/5/2016 that showed a hypermetabolic opacity in the left axilla wihtout other pathological activity. It was then recommended that she undergo neoadjuvant chemotherapy with Cytoxan and Doxorubicin that will be followed by Taxol, Herceptin. Patient initiated treatment on 10/11/2016.       Interval History:  Patient returning today for follow-up visit.  She has been feeling well without recent complaints weight loss night sweats fever or chills.  She denies any nausea vomiting or diarrhea.  She denies any shortness of breath or cough or wheezing.    Review Of Systems:  Constitutional: Negative for fever, chills, and night sweats.  Skin: negative.  Eyes: negative.  Ears/Nose/Throat: negative.  Respiratory: No shortness of breath, dyspnea on exertion, cough, or hemoptysis.  Cardiovascular: negative.  Gastrointestinal: negative.  Genitourinary: negative.  Musculoskeletal: negative.  Neurologic:  "negative.  Psychiatric: negative.  Hematologic/Lymphatic/Immunologic: negative.  Endocrine: negative.    All other ROS negative unless mentioned in interval history.    Past medical, social, surgical, and family histories reviewed.    Allergies:  Allergies as of 11/21/2019 - Reviewed 11/21/2019   Allergen Reaction Noted     No known drug allergies  07/05/2002       Current Medications:  Current Outpatient Medications   Medication Sig Dispense Refill     acetaminophen (TYLENOL EX ST ARTHRITIS PAIN) 500 MG tablet Take 500-1,000 mg by mouth every 6 hours as needed for mild pain       ALPRAZolam (XANAX) 0.5 MG tablet Take 1 tablet (0.5 mg) by mouth 3 times daily as needed for anxiety No further refills without office visit. 30 tablet 0     atorvastatin (LIPITOR) 20 MG tablet Take 1 tablet (20 mg) by mouth daily 90 tablet 1     escitalopram (LEXAPRO) 20 MG tablet TAKE ONE TABLET BY MOUTH ONCE DAILY 90 tablet 1     hydrochlorothiazide (HYDRODIURIL) 25 MG tablet Take 1 tablet (25 mg) by mouth daily 90 tablet 1     Lansoprazole (PREVACID PO) Take 40 mg by mouth       oxyCODONE-acetaminophen (PERCOCET) 5-325 MG tablet Take 1-2 tablets by mouth every 6 hours as needed for moderate to severe pain (#30 to last 30 days) 30 tablet 0     prochlorperazine (COMPAZINE) 10 MG tablet TAKE ONE TABLET BY MOUTH EVERY 6 HOURS AS NEEDED (NAUSEA/VOMITING) 30 tablet 3     tamsulosin (FLOMAX) 0.4 MG capsule Take 2 capsules (0.8 mg) by mouth daily 180 capsule 1     hydrocortisone 2.5 % ointment Apply twice daily to chin as needed for itch/rash. Use for up to two weeks. (Patient not taking: Reported on 11/21/2019) 20 g 0     NONFORMULARY           Physical Exam:  /62   Pulse 100   Temp 97.2  F (36.2  C) (Oral)   Ht 1.676 m (5' 6\")   Wt 63.5 kg (139 lb 14.4 oz)   SpO2 99%   BMI 22.58 kg/m    Wt Readings from Last 12 Encounters:   11/21/19 63.5 kg (139 lb 14.4 oz)   09/18/19 63.5 kg (140 lb)   09/09/19 63.8 kg (140 lb 11.2 oz) " "  05/14/19 61.6 kg (135 lb 14.4 oz)   04/08/19 62.3 kg (137 lb 6.4 oz)   03/21/19 62.5 kg (137 lb 12.8 oz)   01/11/19 62.1 kg (136 lb 12.8 oz)   11/20/18 62.2 kg (137 lb 3.2 oz)   10/25/18 62.6 kg (138 lb)   09/07/18 64 kg (141 lb 1.6 oz)   06/05/18 65.1 kg (143 lb 9.6 oz)   05/15/18 63.5 kg (140 lb)     ECOG performance status: 0  GENERAL APPEARANCE: Healthy, alert and in no acute distress.  HEENT: Sclerae anicteric. PERRLA. Oropharynx without ulcers, lesions, or thrush.  NECK: Supple. No asymmetry or masses.  LYMPHATICS: No palpable cervical, supraclavicular, axillary, or inguinal lymphadenopathy.  RESP: Lungs clear to auscultation bilaterally without rales, rhonchi or wheezes.\", BREAST: Scars of bilateral mastectomies \"CARDIOVASCULAR: Regular rate and rhythm. Normal S1, S2; no S3 or S4. No murmur, gallop, or rub.  ABDOMEN: Soft, nontender. Bowel sounds normal. No palpable organomegaly or masses.  MUSCULOSKELETAL: Extremities without gross deformities noted. No edema of bilateral lower extremities.  SKIN: No suspicious lesions or rashes.  NEURO: Alert and oriented x 3. Cranial nerves II-XII grossly intact.  PSYCHIATRIC: Mentation and affect appear normal.    Laboratory/Imaging Studies:  Orders Only on 11/14/2019   Component Date Value Ref Range Status     CA 27-29 11/14/2019 23  0 - 39 U/mL Final    Assay Method:  Chemiluminescence using Siemens Centaur XP     Sodium 11/14/2019 139  133 - 144 mmol/L Final     Potassium 11/14/2019 3.9  3.4 - 5.3 mmol/L Final     Chloride 11/14/2019 105  94 - 109 mmol/L Final     Carbon Dioxide 11/14/2019 30  20 - 32 mmol/L Final     Anion Gap 11/14/2019 4  3 - 14 mmol/L Final     Glucose 11/14/2019 105* 70 - 99 mg/dL Final     Urea Nitrogen 11/14/2019 22  7 - 30 mg/dL Final     Creatinine 11/14/2019 0.98  0.52 - 1.04 mg/dL Final     GFR Estimate 11/14/2019 58* >60 mL/min/[1.73_m2] Final    Comment: Non  GFR Calc  Starting 12/18/2018, serum creatinine based estimated " GFR (eGFR) will be   calculated using the Chronic Kidney Disease Epidemiology Collaboration   (CKD-EPI) equation.       GFR Estimate If Black 11/14/2019 68  >60 mL/min/[1.73_m2] Final    Comment:  GFR Calc  Starting 12/18/2018, serum creatinine based estimated GFR (eGFR) will be   calculated using the Chronic Kidney Disease Epidemiology Collaboration   (CKD-EPI) equation.       Calcium 11/14/2019 10.0  8.5 - 10.1 mg/dL Final     Bilirubin Total 11/14/2019 0.4  0.2 - 1.3 mg/dL Final     Albumin 11/14/2019 4.6  3.4 - 5.0 g/dL Final     Protein Total 11/14/2019 7.9  6.8 - 8.8 g/dL Final     Alkaline Phosphatase 11/14/2019 53  40 - 150 U/L Final     ALT 11/14/2019 40  0 - 50 U/L Final     AST 11/14/2019 28  0 - 45 U/L Final     WBC 11/14/2019 9.0  4.0 - 11.0 10e9/L Final     RBC Count 11/14/2019 3.61* 3.8 - 5.2 10e12/L Final     Hemoglobin 11/14/2019 12.3  11.7 - 15.7 g/dL Final     Hematocrit 11/14/2019 37.3  35.0 - 47.0 % Final     MCV 11/14/2019 103* 78 - 100 fl Final     MCH 11/14/2019 34.1* 26.5 - 33.0 pg Final     MCHC 11/14/2019 33.0  31.5 - 36.5 g/dL Final     RDW 11/14/2019 14.1  10.0 - 15.0 % Final     Platelet Count 11/14/2019 280  150 - 450 10e9/L Final     Diff Method 11/14/2019 Automated Method   Final     % Neutrophils 11/14/2019 69.7  % Final     % Lymphocytes 11/14/2019 18.4  % Final     % Monocytes 11/14/2019 7.4  % Final     % Eosinophils 11/14/2019 3.5  % Final     % Basophils 11/14/2019 0.7  % Final     % Immature Granulocytes 11/14/2019 0.3  % Final     Nucleated RBCs 11/14/2019 0  0 /100 Final     Absolute Neutrophil 11/14/2019 6.3  1.6 - 8.3 10e9/L Final     Absolute Lymphocytes 11/14/2019 1.7  0.8 - 5.3 10e9/L Final     Absolute Monocytes 11/14/2019 0.7  0.0 - 1.3 10e9/L Final     Absolute Basophils 11/14/2019 0.1  0.0 - 0.2 10e9/L Final     Abs Immature Granulocytes 11/14/2019 0.0  0 - 0.4 10e9/L Final     Absolute Nucleated RBC 11/14/2019 0.0   Final            Assessment and  plan:    (C50.412) Malignant neoplasm of upper-outer quadrant of left female breast, unspecified estrogen receptor status (H)  (primary encounter diagnosis)  (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  There is no clinical evidence of breast cancer recurrence.  We will see the patient again in 6 months time or sooner if there are new developments or concerns.    (F43.21) Adjustment disorder with depressed mood  Symptoms of well managed with Lexapro 20 mg orally daily.    (I10) Essential hypertension  Pressure is currently well controlled.  Patient currently on hydrochlorothiazide 25 mg orally daily.    (E78.5) Hyperlipidemia LDL goal <130  Patient currently on atorvastatin 20 mg orally daily.    The patient is ready to learn, no apparent learning barriers were identified.  Diagnosis and treatment plans were explained to the patient. The patient expressed understanding of the content. The patient asked appropriate questions. The patient questions were answered to her satisfaction.    Chart documentation with Dragon Voice recognition Software. Although reviewed after completion, some words and grammatical errors may remain.

## 2019-12-01 NOTE — PROGRESS NOTES
Here for chemotherapy, tolerated well.  Labs/BSA/Dosing verified by RN x2. Positive blood return noted pre and post chemotherapy administration. Discharged in stable condition.    No

## 2019-12-02 ENCOUNTER — MYC MEDICAL ADVICE (OUTPATIENT)
Dept: FAMILY MEDICINE | Facility: OTHER | Age: 69
End: 2019-12-02

## 2019-12-02 DIAGNOSIS — G89.4 CHRONIC PAIN SYNDROME: ICD-10-CM

## 2019-12-02 DIAGNOSIS — M51.369 DDD (DEGENERATIVE DISC DISEASE), LUMBAR: ICD-10-CM

## 2019-12-03 DIAGNOSIS — G89.4 CHRONIC PAIN SYNDROME: ICD-10-CM

## 2019-12-03 DIAGNOSIS — M51.369 DDD (DEGENERATIVE DISC DISEASE), LUMBAR: ICD-10-CM

## 2019-12-03 RX ORDER — OXYCODONE AND ACETAMINOPHEN 5; 325 MG/1; MG/1
1-2 TABLET ORAL EVERY 6 HOURS PRN
Qty: 30 TABLET | Refills: 0 | Status: SHIPPED | OUTPATIENT
Start: 2019-12-03 | End: 2020-01-23

## 2019-12-04 RX ORDER — OXYCODONE AND ACETAMINOPHEN 5; 325 MG/1; MG/1
TABLET ORAL
Qty: 30 TABLET | Refills: 0 | OUTPATIENT
Start: 2019-12-04

## 2019-12-14 ENCOUNTER — MYC MEDICAL ADVICE (OUTPATIENT)
Dept: FAMILY MEDICINE | Facility: OTHER | Age: 69
End: 2019-12-14

## 2019-12-27 ENCOUNTER — HOSPITAL ENCOUNTER (OUTPATIENT)
Dept: MRI IMAGING | Facility: CLINIC | Age: 69
Discharge: HOME | End: 2019-12-27
Attending: NEUROLOGICAL SURGERY | Admitting: NEUROLOGICAL SURGERY
Payer: MEDICARE

## 2019-12-27 DIAGNOSIS — M54.16 LUMBAR RADICULOPATHY: ICD-10-CM

## 2019-12-27 PROCEDURE — 72148 MRI LUMBAR SPINE W/O DYE: CPT

## 2020-01-02 ENCOUNTER — OFFICE VISIT (OUTPATIENT)
Dept: NEUROSURGERY | Facility: CLINIC | Age: 70
End: 2020-01-02
Payer: COMMERCIAL

## 2020-01-02 ENCOUNTER — TELEPHONE (OUTPATIENT)
Dept: NEUROSURGERY | Facility: OTHER | Age: 70
End: 2020-01-02

## 2020-01-02 VITALS
DIASTOLIC BLOOD PRESSURE: 82 MMHG | RESPIRATION RATE: 18 BRPM | SYSTOLIC BLOOD PRESSURE: 134 MMHG | WEIGHT: 138.2 LBS | HEART RATE: 94 BPM | BODY MASS INDEX: 22.21 KG/M2 | HEIGHT: 66 IN | OXYGEN SATURATION: 98 % | TEMPERATURE: 97.8 F

## 2020-01-02 DIAGNOSIS — Z78.0 POST-MENOPAUSAL: ICD-10-CM

## 2020-01-02 DIAGNOSIS — M54.16 LUMBAR RADICULOPATHY: Primary | ICD-10-CM

## 2020-01-02 DIAGNOSIS — F43.21 ADJUSTMENT DISORDER WITH DEPRESSED MOOD: ICD-10-CM

## 2020-01-02 DIAGNOSIS — F41.9 ANXIETY: ICD-10-CM

## 2020-01-02 PROCEDURE — 99213 OFFICE O/P EST LOW 20 MIN: CPT | Performed by: NEUROLOGICAL SURGERY

## 2020-01-02 ASSESSMENT — PAIN SCALES - GENERAL: PAINLEVEL: MODERATE PAIN (5)

## 2020-01-02 ASSESSMENT — MIFFLIN-ST. JEOR: SCORE: 1163.62

## 2020-01-02 NOTE — TELEPHONE ENCOUNTER
Called and spoke to patient. Informed that DX has been updated and is covered so she can call to schedule her DEXA. Patient was appreciative. She was provided with central scheduling number. She had no further questions or concerns.

## 2020-01-02 NOTE — PROGRESS NOTES
"Michaela Dorman is a 69 year old female who presents for:  Chief Complaint   Patient presents with     Neurologic Problem     F/u discuss Surg         Initial Vitals:  /82   Pulse 94   Temp 97.8  F (36.6  C) (Temporal)   Resp 18   Ht 5' 6\" (1.676 m)   Wt 138 lb 3.2 oz (62.7 kg)   SpO2 98%   BMI 22.31 kg/m   Estimated body mass index is 22.31 kg/m  as calculated from the following:    Height as of this encounter: 5' 6\" (1.676 m).    Weight as of this encounter: 138 lb 3.2 oz (62.7 kg).. Body surface area is 1.71 meters squared. BP completed using cuff size: regular  Moderate Pain (5)        Isabella Messer CMA  "

## 2020-01-02 NOTE — PATIENT INSTRUCTIONS
Today's Visit    1. Ordered a dexa scan. To schedule, please call [phone]. You should schedule this within one week. We will call you with the results. If you don't hear from us 7 days after the scan, please call us.  2.  follow up in 2 weeks with Dr. Gomes.    Please call our clinic with any questions or concerns    Radha EMERSON RN  [facility] - [address] [address]  Telephone:  [phone]       Fax:  [phone]

## 2020-01-02 NOTE — TELEPHONE ENCOUNTER
Reason for Call:  Other call back    Detailed comments: Pt called to schedule her Dexa scan and was told that her insurance would not cover the dx and would need a different dx listed to be covered.    Phone Number Patient can be reached at: Home number on file [phone] (home)    Best Time: any    Can we leave a detailed message on this number? YES    Call taken on 1/2/2020 at 2:53 PM by Mallika Orourke

## 2020-01-02 NOTE — PROGRESS NOTES
Michaela Dorman is a 67 year old female who presents for evaluation of her chief complaint of left leg and low back pain. Symptoms have been present for about 1 year, but have become more persistent over the last 4 months. She describes severe pain that radiates down the lateral aspect of her left thigh and calf, as far as the ankle. She has severe ankle pain. She even received an in injection into the left ankle, with no symptomatic improvement. She has not had any epidural injections. She is unable to stand or participate in any significant activities. She denies bowel or bladder problems, or problems with balance or coordination.    Returns today for further follow-up.  Had several days of excellent pain relief after the injection, although the pain has returned.  Scoliosis x-rays showed that the deformity is all local in the caudal lumbar region.  Her overall activity level is fairly good, she takes half an oxycodone in the morning, then is able to walk for extended distances on the treadmill.    She returns for follow up.  Progressive and worsening pain over the last 6 months.  Has done 2 more VIANNEY without durable relief.  PT as well.  Daily, severe pain, limiting her ability to walk distances, sleep at night, and pursue daily activities.    Returns for follow-up.  At this point, having low back pain radiating to the left posterior thigh and calf, to the ankle. Most mornings, lifts weights, than walks 3 miles, but subsequently needs an oxycodone tablet to do most meaningful activities through the rest of the day.    Past Medical History:   Diagnosis Date     Central stenosis of spinal canal 12/1/2017    lumbar      DDD (degenerative disc disease), lumbar 12/1/2017     Depressive disorder, not elsewhere classified      Esophageal reflux      ETOH abuse     in remission     Foraminal stenosis of lumbar region 12/1/2017    Complete lumbar spine left at various degrees and to a lesser extent the right as well.      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    Past Surgical History:   Procedure Laterality Date     ARTHROPLASTY SHOULDER Right 11/17/2015     ARTHROSCOPY KNEE  6/7/2012    Procedure:ARTHROSCOPY KNEE; right knee arthroscopy with partial lateral menisectomy; Surgeon:DAMIÁN MCALLISTER; [facility]     C LIGATE FALLOPIAN TUBE       C NONSPECIFIC PROCEDURE  1956    ankle fracture surgery     COLONOSCOPY N/A 12/22/2017    Procedure: COLONOSCOPY;  colonoscopy;  Surgeon: Chuck Chand MD;  [facility]     EXCISE MASS AXILLA Left 9/16/2016    Procedure: EXCISE MASS AXILLA;  Surgeon: Keyon Blanco MD;  [facility]     HC REMV CATARACT EXTRACAP,INSERT LENS, W/O ECP  6/25/2009    Right eye     INJECT EPIDURAL LUMBAR N/A 4/11/2019    Procedure: interlaminar epidual steroid injection lumbar 4-5;  Surgeon: Oskar Salvador MD;  [facility]     INJECT EPIDURAL LUMBAR Bilateral 9/12/2019    Procedure: lumbar 4-5 epidural interlaminar steroid injection;  Surgeon: Oskar Salvador MD;  [facility]     INSERT PORT VASCULAR ACCESS Right 10/7/2016    Procedure: INSERT PORT VASCULAR ACCESS;  Surgeon: Keyon Blanco MD;  [facility]     MASTECTOMY SIMPLE BILATERAL Bilateral 2/8/2017    Procedure: MASTECTOMY SIMPLE BILATERAL;  Surgeon: Keyon Blanco MD;  [facility]     OPEN REDUCTION INTERNAL FIXATION FOOT Right 3/20/2015    Procedure: OPEN REDUCTION INTERNAL FIXATION FOOT;  Surgeon: Javi Herrmann DPM;  [facility]     REMOVE PORT VASCULAR ACCESS Right 2/14/2018    Procedure: REMOVE PORT VASCULAR ACCESS;  right intra jugular port removal;  Surgeon: Keyon Blanco MD;  [facility]     SHOULDER SURGERY Right 11/2015     Past Surgical History reviewed  with patient during visit.    Current Outpatient Medications   Medication     acetaminophen (TYLENOL EX ST ARTHRITIS PAIN) 500 MG tablet     ALPRAZolam (XANAX) 0.5 MG tablet     atorvastatin (LIPITOR) 20 MG tablet     escitalopram (LEXAPRO) 20 MG tablet     hydrochlorothiazide (HYDRODIURIL) 25 MG tablet     Lansoprazole (PREVACID PO)     oxyCODONE-acetaminophen (PERCOCET) 5-325 MG tablet     tamsulosin (FLOMAX) 0.4 MG capsule     hydrocortisone 2.5 % ointment     NONFORMULARY     prochlorperazine (COMPAZINE) 10 MG tablet     No current facility-administered medications for this visit.        Allergies   Allergen Reactions     No Known Drug Allergies        Social History     Social History     Marital status:      Spouse name: ozzie     Number of children: 5     Years of education: N/A     Occupational History      Homemaker     Social History Main Topics     Smoking status: Former Smoker     Packs/day: 3.00     Years: 10.00     Types: Cigarettes     Quit date: 12/1/1979     Smokeless tobacco: Never Used      Comment: approx. 3 packs daily     Alcohol use 0.0 oz/week     0 Standard drinks or equivalent per week      Comment: daily glass of wine     Drug use: No     Sexual activity: Yes     Partners: Male     Other Topics Concern     Caffeine Concern No     Hobby Hazards No     Sleep Concern No     Stress Concern Yes     Exercise Yes     Seat Belt Yes     Social History Narrative       Family History   Problem Relation Age of Onset     Hypertension Mother      Thyroid Disease Mother      Cerebrovascular Disease Mother      Hypertension Father      Cerebrovascular Disease Father      Cancer Father         skin     Thyroid Disease Sister      Diabetes Brother         type 2     Depression Sister      Breast Cancer Sister         age 47     Cancer Sister         skin     Cancer Brother         inside nose          ROS: 10 point ROS neg other than the symptoms noted above in the HPI.    Vital Signs: /82    "Pulse 94   Temp 97.8  F (36.6  C) (Temporal)   Resp 18   Ht 1.676 m (5' 6\")   Wt 62.7 kg (138 lb 3.2 oz)   SpO2 98%   BMI 22.31 kg/m      Examination:  Constitutional:  Alert, well nourished, NAD.  HEENT: Normocephalic, atraumatic.   Pulmonary:  Without shortness of breath, normal effort.   Lymph: no lymphadenopathy to low back or LE.   Integumentary: Skin is free of rashes or lesions.   Cardiovascular:  No pitting edema of BLE.    Psych: Normal affect, no apparent distress    Neurological:  Awake  Alert  Oriented x 3  Speech clear  Cranial nerves II - XII grossly intact  Motor exam   Hip Flexor:                Right: 5/5  Left:  5/5  Hip Adductor:             Right:  5/5  Left:  5/5  Hip Abductor:             Right:  5/5  Left:  5/5  Gastroc Soleus:        Right:  5/5  Left:  5/5  Tib/Ant:                      Right:  5/5  Left:  5/5  EHL:                          Right:  5/5  Left:  5/5       Sensation normal to bilateral upper and lower extremities.    Reflexes are 2+ in the patellar and Achilles. There is no clonus. Downgoing Babinski.    Musculoskeletal:  Gait: Able to stand from a seated position. Normal non-antalgic, non-myelopathic gait.  Able to heel/toe walk without loss of balance  She is tender palpation of the lumbar paraspinous musculature.    Imaging:   MRI of the lumbar spine was reviewed in the office today. She does have multiple levels of disc degeneration and disc bulging from L2 through S1. She has multiple levels of moderate to severe foraminal stenosis from L4 through S1. Disc degeneration is most significant at L4-5 and L5-S1, where there is loss of disc height and erosion of the endplates.    Assessment/Plan:     Low back pain  Radicular left leg pain  Lumbar disc degeneration multilevel  Lumbar stenosis    Discussed L2-S1 PSF  Will obtain DEXA scan  She will think it over, and come back to clinic to discuss further  "

## 2020-01-02 NOTE — TELEPHONE ENCOUNTER
Routing refill request to provider for review/approval because:  Drug not on the [facility] refill protocol     Last Written Prescription Date:  9/9/19  Last Fill Quantity: 30,  # refills: 0   Last office visit: 9/9/2019 with prescribing provider:     Future Office Visit:   Next 5 appointments (look out 90 days)    Jan 02, 2020  1:45 PM CST  Return Visit with Lenny Gomes MD  [facility] ([address] [id number] [phone]   Feb 20, 2020 11:00 AM CST  Return Visit with Loren Felipe MD  [facility] ([facility] [address] [place] [id number] [phone]         Mallika Barahona, RN, BSN

## 2020-01-02 NOTE — LETTER
"    1/2/2020         RE: Michaela Dorman  [address]  [facility] [address]        Dear Colleague,    Thank you for referring your patient, Michaela Dorman, to the [facility]. Please see a copy of my visit note below.    Michaela Dorman is a 69 year old female who presents for:  Chief Complaint   Patient presents with     Neurologic Problem     F/u discuss Surg         Initial Vitals:  /82   Pulse 94   Temp 97.8  F (36.6  C) (Temporal)   Resp 18   Ht 5' 6\" (1.676 m)   Wt 138 lb 3.2 oz (62.7 kg)   SpO2 98%   BMI 22.31 kg/m    Estimated body mass index is 22.31 kg/m  as calculated from the following:    Height as of this encounter: 5' 6\" (1.676 m).    Weight as of this encounter: 138 lb 3.2 oz (62.7 kg).. Body surface area is 1.71 meters squared. BP completed using cuff size: regular  Moderate Pain (5)        Isabella Messer [facility]    Michaela Dorman is a 67 year old female who presents for evaluation of her chief complaint of left leg and low back pain. Symptoms have been present for about 1 year, but have become more persistent over the last 4 months. She describes severe pain that radiates down the lateral aspect of her left thigh and calf, as far as the ankle. She has severe ankle pain. She even received an in injection into the left ankle, with no symptomatic improvement. She has not had any epidural injections. She is unable to stand or participate in any significant activities. She denies bowel or bladder problems, or problems with balance or coordination.    Returns today for further follow-up.  Had several days of excellent pain relief after the injection, although the pain has returned.  Scoliosis x-rays showed that the deformity is all local in the caudal lumbar region.  Her overall activity level is fairly good, she takes half an oxycodone in the morning, then is able to walk for extended distances on the treadmill.    She returns for follow up.  Progressive and " worsening pain over the last 6 months.  Has done 2 more VIANNEY without durable relief.  PT as well.  Daily, severe pain, limiting her ability to walk distances, sleep at night, and pursue daily activities.    Returns for follow-up.  At this point, having low back pain radiating to the left posterior thigh and calf, to the ankle. Most mornings, lifts weights, than walks 3 miles, but subsequently needs an oxycodone tablet to do most meaningful activities through the rest of the day.    Past Medical History:   Diagnosis Date     Central stenosis of spinal canal 12/1/2017    lumbar      DDD (degenerative disc disease), lumbar 12/1/2017     Depressive disorder, not elsewhere classified      Esophageal reflux      ETOH abuse     in remission     Foraminal stenosis of lumbar region 12/1/2017    Complete lumbar spine left at various degrees and to a lesser extent the right as well.     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    Past Surgical History:   Procedure Laterality Date     ARTHROPLASTY SHOULDER Right 11/17/2015     ARTHROSCOPY KNEE  6/7/2012    Procedure:ARTHROSCOPY KNEE; right knee arthroscopy with partial lateral menisectomy; Surgeon:DAMIÁN MCALLISTER; [facility]     C LIGATE FALLOPIAN TUBE       C NONSPECIFIC PROCEDURE  1956    ankle fracture surgery     COLONOSCOPY N/A 12/22/2017    Procedure: COLONOSCOPY;  colonoscopy;  Surgeon: Chuck Chand MD;  [facility]     EXCISE MASS AXILLA Left 9/16/2016    Procedure: EXCISE MASS AXILLA;  Surgeon: Keyon Blanco MD;  [facility]     HC REMV CATARACT EXTRACAP,INSERT LENS, W/O ECP  6/25/2009    Right eye     INJECT EPIDURAL LUMBAR N/A 4/11/2019    Procedure: interlaminar epidual steroid injection lumbar 4-5;  Surgeon:  Oskar Salvador MD;  [facility]     INJECT EPIDURAL LUMBAR Bilateral 9/12/2019    Procedure: lumbar 4-5 epidural interlaminar steroid injection;  Surgeon: Oskar Salvador MD;  [facility]     INSERT PORT VASCULAR ACCESS Right 10/7/2016    Procedure: INSERT PORT VASCULAR ACCESS;  Surgeon: Keyon Blanco MD;  [facility]     MASTECTOMY SIMPLE BILATERAL Bilateral 2/8/2017    Procedure: MASTECTOMY SIMPLE BILATERAL;  Surgeon: Keyon Blanco MD;  [facility]     OPEN REDUCTION INTERNAL FIXATION FOOT Right 3/20/2015    Procedure: OPEN REDUCTION INTERNAL FIXATION FOOT;  Surgeon: Javi Herrmann DPM;  [facility]     REMOVE PORT VASCULAR ACCESS Right 2/14/2018    Procedure: REMOVE PORT VASCULAR ACCESS;  right intra jugular port removal;  Surgeon: Keyon Blanco MD;  [facility]     SHOULDER SURGERY Right 11/2015     Past Surgical History reviewed with patient during visit.    Current Outpatient Medications   Medication     acetaminophen (TYLENOL EX ST ARTHRITIS PAIN) 500 MG tablet     ALPRAZolam (XANAX) 0.5 MG tablet     atorvastatin (LIPITOR) 20 MG tablet     escitalopram (LEXAPRO) 20 MG tablet     hydrochlorothiazide (HYDRODIURIL) 25 MG tablet     Lansoprazole (PREVACID PO)     oxyCODONE-acetaminophen (PERCOCET) 5-325 MG tablet     tamsulosin (FLOMAX) 0.4 MG capsule     hydrocortisone 2.5 % ointment     NONFORMULARY     prochlorperazine (COMPAZINE) 10 MG tablet     No current facility-administered medications for this visit.        Allergies   Allergen Reactions     No Known Drug Allergies        Social History     Social History     Marital status:      Spouse name: ozzie     Number of children: 5     Years of education: N/A     Occupational History      Homemaker     Social History Main Topics     Smoking status: Former Smoker     Packs/day: 3.00     Years: 10.00     Types: Cigarettes     Quit date: 12/1/1979     Smokeless tobacco: Never Used      Comment: approx. 3 packs  "daily     Alcohol use 0.0 oz/week     0 Standard drinks or equivalent per week      Comment: daily glass of wine     Drug use: No     Sexual activity: Yes     Partners: Male     Other Topics Concern     Caffeine Concern No     Hobby Hazards No     Sleep Concern No     Stress Concern Yes     Exercise Yes     Seat Belt Yes     Social History Narrative       Family History   Problem Relation Age of Onset     Hypertension Mother      Thyroid Disease Mother      Cerebrovascular Disease Mother      Hypertension Father      Cerebrovascular Disease Father      Cancer Father         skin     Thyroid Disease Sister      Diabetes Brother         type 2     Depression Sister      Breast Cancer Sister         age 47     Cancer Sister         skin     Cancer Brother         inside nose          ROS: 10 point ROS neg other than the symptoms noted above in the HPI.    Vital Signs: /82   Pulse 94   Temp 97.8  F (36.6  C) (Temporal)   Resp 18   Ht 1.676 m (5' 6\")   Wt 62.7 kg (138 lb 3.2 oz)   SpO2 98%   BMI 22.31 kg/m       Examination:  Constitutional:  Alert, well nourished, NAD.  HEENT: Normocephalic, atraumatic.   Pulmonary:  Without shortness of breath, normal effort.   Lymph: no lymphadenopathy to low back or LE.   Integumentary: Skin is free of rashes or lesions.   Cardiovascular:  No pitting edema of BLE.    Psych: Normal affect, no apparent distress    Neurological:  Awake  Alert  Oriented x 3  Speech clear  Cranial nerves II - XII grossly intact  Motor exam   Hip Flexor:                Right: 5/5  Left:  5/5  Hip Adductor:             Right:  5/5  Left:  5/5  Hip Abductor:             Right:  5/5  Left:  5/5  Gastroc Soleus:        Right:  5/5  Left:  5/5  Tib/Ant:                      Right:  5/5  Left:  5/5  EHL:                          Right:  5/5  Left:  5/5       Sensation normal to bilateral upper and lower extremities.    Reflexes are 2+ in the patellar and Achilles. There is no clonus. Downgoing " Babinski.    Musculoskeletal:  Gait: Able to stand from a seated position. Normal non-antalgic, non-myelopathic gait.  Able to heel/toe walk without loss of balance  She is tender palpation of the lumbar paraspinous musculature.    Imaging:   MRI of the lumbar spine was reviewed in the office today. She does have multiple levels of disc degeneration and disc bulging from L2 through S1. She has multiple levels of moderate to severe foraminal stenosis from L4 through S1. Disc degeneration is most significant at L4-5 and L5-S1, where there is loss of disc height and erosion of the endplates.    Assessment/Plan:     Low back pain  Radicular left leg pain  Lumbar disc degeneration multilevel  Lumbar stenosis    Discussed L2-S1 PSF  Will obtain DEXA scan  She will think it over, and come back to clinic to discuss further    Again, thank you for allowing me to participate in the care of your patient.        Sincerely,        Lenny Gomes MD

## 2020-01-03 RX ORDER — ALPRAZOLAM 0.5 MG
TABLET ORAL
Qty: 30 TABLET | Refills: 0 | Status: SHIPPED | OUTPATIENT
Start: 2020-01-03 | End: 2020-03-03

## 2020-01-06 ENCOUNTER — HOSPITAL ENCOUNTER (OUTPATIENT)
Dept: BONE DENSITY | Facility: CLINIC | Age: 70
Discharge: HOME | End: 2020-01-06
Attending: NEUROLOGICAL SURGERY | Admitting: NEUROLOGICAL SURGERY
Payer: MEDICARE

## 2020-01-06 DIAGNOSIS — Z78.0 POST-MENOPAUSAL: ICD-10-CM

## 2020-01-06 DIAGNOSIS — M54.16 LUMBAR RADICULOPATHY: ICD-10-CM

## 2020-01-06 PROCEDURE — 77080 DXA BONE DENSITY AXIAL: CPT

## 2020-01-09 ENCOUNTER — OFFICE VISIT (OUTPATIENT)
Dept: NEUROSURGERY | Facility: OTHER | Age: 70
End: 2020-01-09
Payer: COMMERCIAL

## 2020-01-09 VITALS
HEIGHT: 66 IN | DIASTOLIC BLOOD PRESSURE: 58 MMHG | SYSTOLIC BLOOD PRESSURE: 100 MMHG | BODY MASS INDEX: 22.18 KG/M2 | TEMPERATURE: 98 F | WEIGHT: 138 LBS

## 2020-01-09 DIAGNOSIS — E78.5 HYPERLIPIDEMIA: ICD-10-CM

## 2020-01-09 DIAGNOSIS — R26.81 UNSTEADINESS ON FEET: ICD-10-CM

## 2020-01-09 DIAGNOSIS — I10 HTN (HYPERTENSION): ICD-10-CM

## 2020-01-09 DIAGNOSIS — M51.9 DISC DISORDER OF LUMBAR REGION: Primary | ICD-10-CM

## 2020-01-09 PROCEDURE — 99214 OFFICE O/P EST MOD 30 MIN: CPT | Performed by: NEUROLOGICAL SURGERY

## 2020-01-09 ASSESSMENT — MIFFLIN-ST. JEOR: SCORE: 1162.71

## 2020-01-09 NOTE — PATIENT INSTRUCTIONS
Surgery scheduled at [facility] for L2-S1 TLIF (transforaminal lumbar interbody fusion)   Need Scoliosis x-rays, CT lumbar, and Cortaid Ultrasound prior. Please call [phone].   Pre-Operative:  -Surgical risks: blood clots in the leg or lung, problems urinating, nerve damage, drainage from the incision, infection, stiffness.  Smoking Cessation: You are advised to quit smoking immediately through recovery to help with healing and reduce risk of complications. Printout given.  - Pre-operative physical with primary care physician within 30 days of surgical date.    Spine Fusion Education: [email or web address]  - Discontinue Aspirin, NSAIDs (Advil, Ibuprofen, Naproxen, Nuprin, Diclofenac, Meloxicam, Aleve, Celebrex) x 7 days prior to surgical date. After surgery, do not begin taking these medications until given clearance. May cause bleeding and interfere with bone healing.  - Discontinue Plavix x 7-10 days prior to surgical date, stay off Plavix 3-4 days post operatively. Discontinue Xarelto 5-7 days prior to surgery. Discontinue coumadin/warfarin 5-7 days prior to surgery. INR needs to be <1.4  - May take Tylenol for pain.  -Shower procedure: Please shower with antibacterial soap the night before surgery and the morning of surgery. Refer to information sheet in folder.   -Stop all solid foods and liquids 8 hours before surgery.     Post-Operative:  - 2-4 night hospitalization stay  - Post operative pain may require pain medications and muscle relaxants. You will receive medications upon discharge. For refills, please call our clinic. A nurse will call you back to obtain a pain assessment. Please call 3-4 business days before you run out.  -Do NOT drive while taking narcotic pain medication.  -Post operative incision care- Watch for signs of infection: redness, swelling, warmth, drainage, and fever of 101 degrees or higher. Notify clinic [phone].  -Keep incision clean and dry. You may  shower. No submerging incision in water such as pools, hot tubs, baths for at least 8 weeks or until incision is healed.   - Post operative activity limitations for 6 weeks after surgery: no lifting > 10 pounds, no bending, twisting, or overhead reaching. You will be re-evaluated at your follow up appointments.   -If a brace is required per Dr. Gomes, [facility] will fit you for the brace in the hospital.  -If you are currently employed, you will need to be off work for recovery and healing. Please fax any FMLA/short term disability paperwork to [phone]. You may call our clinic when you'd like to return to work and we can provide a work letter.   - Follow up appointments: 2 weeks post op for suture/staple removal. Then 6 week post op, 3 months post op, 6 months post op, 1 year post op with an xray before each appointment. Please call to schedule these appointments at [phone].   -Surgery folder provided to patient.    Radha EMERSON RN  [facility] - [address] [address]  Telephone:  [phone]       Fax:  [phone]

## 2020-01-09 NOTE — PROGRESS NOTES
[facility]  [address] [facility] [id number] [phone]  Dept: [phone]    PRE-OP EVALUATION:  Today's date: 2020    Michaela Dorman (: 1950) presents for pre-operative evaluation assessment as requested by Dr. Gomes.  She requires evaluation and anesthesia risk assessment prior to undergoing surgery/procedure for treatment of back surgery .    Fax number for surgical facility: ______________________  Primary Physician: Phani Tapia  Type of Anesthesia Anticipated: to be determined    Patient has a Health Care Directive or Living Will:  NO    Preop Questions 2020   Who is doing your surgery? Dr Gomes   What are you having done? [facility]   Date of Surgery/Procedure: Back Surgery    Facility or Hospital where procedure/surgery will be performed: [facility]   1.  Do you have a history of Heart attack, stroke, stent, coronary bypass surgery, or other heart surgery? No   2.  Do you ever have any pain or discomfort in your chest? No   3.  Do you have a history of  Heart Failure? No   4.   Are you troubled by shortness of breath when:  walking on a level surface, or up a slight hill, or at night? No   5.  Do you currently have a cold, bronchitis or other respiratory infection? YES -    6.  Do you have a cough, shortness of breath, or wheezing? YES -    7.  Do you sometimes get pains in the calves of your legs when you walk? YES -    8. Do you or anyone in your family have previous history of blood clots? No   9.  Do you or does anyone in your family have a serious bleeding problem such as prolonged bleeding following surgeries or cuts? No   10. Have you ever had problems with anemia or been told to take iron pills? No   11. Have you had any abnormal blood loss such as black, tarry or bloody stools, or abnormal vaginal bleeding? No   12. Have you ever had a blood transfusion? No   13. Have you or any of your relatives ever had problems with anesthesia? No    14. Do you have sleep apnea, excessive snoring or daytime drowsiness? No   15. Do you have any prosthetic heart valves? No   16. Do you have prosthetic joints? YES -    17. Is there any chance that you may be pregnant? No         HPI:     HPI related to upcoming procedure: ongoing low back pain with radicular signs and symptoms and evidence of foraminal stenosis the lumbar region/degenerative disc disease.      See problem list for active medical problems.  Problems all longstanding and stable, except as noted/documented.  See ROS for pertinent symptoms related to these conditions.      MEDICAL HISTORY:      Past Medical History:   Diagnosis Date     Central stenosis of spinal canal 12/1/2017    lumbar      DDD (degenerative disc disease), lumbar 12/1/2017     Depressive disorder, not elsewhere classified      Esophageal reflux      ETOH abuse     in remission     Foraminal stenosis of lumbar region 12/1/2017    Complete lumbar spine left at various degrees and to a lesser extent the right as well.     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     Subdural hematoma (H)      Past Surgical History:   Procedure Laterality Date     ARTHROPLASTY SHOULDER Right 11/17/2015     ARTHROSCOPY KNEE  6/7/2012    Procedure:ARTHROSCOPY KNEE; right knee arthroscopy with partial lateral menisectomy; Surgeon:DAMIÁN MCALLISTER; Location:[facility]     C LIGATE FALLOPIAN TUBE       C NONSPECIFIC PROCEDURE  1956    ankle fracture surgery     COLONOSCOPY N/A 12/22/2017    Procedure: COLONOSCOPY;  colonoscopy;  Surgeon: Chuck Chand MD;  [facility]: [facility]     EXCISE MASS AXILLA Left 9/16/2016    Procedure: EXCISE MASS AXILLA;  Surgeon: Keyon Blanco MD;  Location: [facility]     HC REMV CATARACT EXTRACAP,INSERT LENS, W/O ECP  6/25/2009    Right eye     INJECT EPIDURAL LUMBAR N/A 4/11/2019     Procedure: interlaminar epidual steroid injection lumbar 4-5;  Surgeon: Oskar Salvador MD;  [facility]     INJECT EPIDURAL LUMBAR Bilateral 9/12/2019    Procedure: lumbar 4-5 epidural interlaminar steroid injection;  Surgeon: Oskar Salvador MD;  [facility]     INSERT PORT VASCULAR ACCESS Right 10/7/2016    Procedure: INSERT PORT VASCULAR ACCESS;  Surgeon: Keyon Blanco MD;  [facility]     MASTECTOMY SIMPLE BILATERAL Bilateral 2/8/2017    Procedure: MASTECTOMY SIMPLE BILATERAL;  Surgeon: Keyon Blanco MD;  [facility]     OPEN REDUCTION INTERNAL FIXATION FOOT Right 3/20/2015    Procedure: OPEN REDUCTION INTERNAL FIXATION FOOT;  Surgeon: Javi Herrmann DPM;  [facility]     REMOVE PORT VASCULAR ACCESS Right 2/14/2018    Procedure: REMOVE PORT VASCULAR ACCESS;  right intra jugular port removal;  Surgeon: Keyon Blanco MD;  [facility]     SHOULDER SURGERY Right 11/2015     Current Outpatient Medications   Medication Sig Dispense Refill     acetaminophen (TYLENOL EX ST ARTHRITIS PAIN) 500 MG tablet Take 500-1,000 mg by mouth every 6 hours as needed for mild pain       ALPRAZolam (XANAX) 0.5 MG tablet TAKE ONE TABLET BY MOUTH THREE TIMES A DAY AS NEEDED FOR ANXIETY -- (NEED TO BE SEEN IN CLINIC FOR FURTHER REFILLS) 30 tablet 0     atorvastatin (LIPITOR) 20 MG tablet Take 1 tablet (20 mg) by mouth daily 90 tablet 1     escitalopram (LEXAPRO) 20 MG tablet TAKE ONE TABLET BY MOUTH ONCE DAILY 90 tablet 1     hydrochlorothiazide (HYDRODIURIL) 25 MG tablet Take 1 tablet (25 mg) by mouth daily 90 tablet 1     hydrocortisone 2.5 % ointment Apply twice daily to chin as needed for itch/rash. Use for up to two weeks. (Patient not taking: Reported on 11/21/2019) 20 g 0     Lansoprazole (PREVACID PO) Take 40 mg by mouth       NONFORMULARY        oxyCODONE-acetaminophen (PERCOCET) 5-325 MG tablet Take 1-2 tablets by mouth every 6 hours as needed for moderate to severe pain (#30 to last 30  "days) 30 tablet 0     prochlorperazine (COMPAZINE) 10 MG tablet TAKE ONE TABLET BY MOUTH EVERY 6 HOURS AS NEEDED (NAUSEA/VOMITING) (Patient not taking: Reported on 2020) 30 tablet 3     tamsulosin (FLOMAX) 0.4 MG capsule Take 2 capsules (0.8 mg) by mouth daily 180 capsule 1     OTC products: None, except as noted above    Allergies   Allergen Reactions     No Known Drug Allergies       Latex Allergy: NO    Social History     Tobacco Use     Smoking status: Former Smoker     Packs/day: 3.00     Years: 10.00     Pack years: 30.00     Types: Cigarettes     Last attempt to quit: 1979     Years since quittin.1     Smokeless tobacco: Never Used     Tobacco comment: approx. 3 packs daily   Substance Use Topics     Alcohol use: Yes     Alcohol/week: 0.0 standard drinks     Comment: daily glass of wine     History   Drug Use No       REVIEW OF SYSTEMS:   CONSTITUTIONAL: NEGATIVE for fever, chills, change in weight  INTEGUMENTARY/SKIN: NEGATIVE for worrisome rashes, moles or lesions  EYES: NEGATIVE for vision changes or irritation  ENT/MOUTH: NEGATIVE for ear, mouth and throat problems  RESP: NEGATIVE for significant cough or SOB mild upper respiratory infection present currently  BREAST: NEGATIVE for masses, tenderness or discharge  CV: NEGATIVE for chest pain, palpitations or peripheral edema  GI: NEGATIVE for nausea, abdominal pain, heartburn, or change in bowel habits  : NEGATIVE for frequency, dysuria, or hematuria  MUSCULOSKELETAL: NEGATIVE for significant arthralgias or myalgia  NEURO: NEGATIVE for weakness, occasional dizziness has been noted and paresthesias related to her low back pain is present  ENDOCRINE: NEGATIVE for temperature intolerance, skin/hair changes  HEME: NEGATIVE for bleeding problems  PSYCHIATRIC: NEGATIVE for changes in mood or affect    EXAM:   /62   Pulse 78   Temp 98.3  F (36.8  C) (Temporal)   Resp 16   Ht 1.702 m (5' 7\")   Wt 61.7 kg (136 lb)   SpO2 97%   BMI 21.30 " kg/m      GENERAL APPEARANCE: healthy, alert and no distress     EYES: EOMI,  PERRL     HENT: ear canals and TM's normal, nose and mouth without ulcers or lesions and cerumen bilateral which is removed by staff during the visit.     NECK: no adenopathy, no asymmetry, masses, or scars and thyroid normal to palpation     RESP: lungs clear to auscultation - no rales, rhonchi or wheezes     CV: regular rates and rhythm, normal S1 S2, no S3 or S4 and no murmur, click or rub     ABDOMEN:  soft, nontender, no HSM or masses and bowel sounds normal     MS: extremities normal- no gross deformities noted, no evidence of inflammation in joints, FROM in all extremities.     SKIN: no suspicious lesions or rashes     NEURO: Normal strength and tone, sensory exam grossly normal, mentation intact and speech normal     PSYCH: mentation appears normal. and affect normal/bright     LYMPHATICS: No cervical adenopathy    DIAGNOSTICS:     EKG: appears normal, NSR, normal axis, normal intervals, no acute ST/T changes c/w ischemia, no LVH by voltage criteria, unchanged from previous tracings  Labs Drawn and in Process:   Unresulted Labs Ordered in the Past 30 Days of this Admission     No orders found from 12/14/2019 to 1/14/2020.          Recent Labs   Lab Test 11/14/19  1255 05/10/19  0913  02/12/18  0917  01/24/17  1016   HGB 12.3 12.0   < > 12.1   < > 9.6*    285   < > 262   < > 315   INR  --   --   --  0.88  --   --     138   < > 138   < > 138   POTASSIUM 3.9 4.0   < > 3.9   < > 3.9   CR 0.98 1.45*   < > 0.87   < > 0.92   A1C  --   --   --   --   --  5.0    < > = values in this interval not displayed.        IMPRESSION:   Reason for surgery/procedure: Intervention in an effort to relieve her of her lumbar radiculopathy  Diagnosis/reason for consult: Anesthesia/surgical clearance    The proposed surgical procedure is considered INTERMEDIATE risk.    REVISED CARDIAC RISK INDEX  The patient has the following serious  cardiovascular risks for perioperative complications such as (MI, PE, VFib and 3  AV Block):  No serious cardiac risks  INTERPRETATION: 1 risks: Class II (low risk - 0.9% complication rate)    The patient has the following additional risks for perioperative complications:  No identified additional risks  The ASCVD Risk score (Nadira GIRON Jr., et al., 2013) failed to calculate for the following reasons:    The valid HDL cholesterol range is 20 to 100 mg/dL      ICD-10-CM    1. Preop general physical exam Z01.818 EKG 12-lead complete w/read - Clinics     CBC with platelets and differential     Comprehensive metabolic panel   2. Lumbar radiculopathy M54.16 EKG 12-lead complete w/read - Clinics     CBC with platelets and differential     Comprehensive metabolic panel   3. Hypertension goal BP (blood pressure) < 140/90 I10 EKG 12-lead complete w/read - Clinics     CBC with platelets and differential     Comprehensive metabolic panel   4. Foraminal stenosis of lumbar region M48.061 EKG 12-lead complete w/read - Clinics     CBC with platelets and differential     Comprehensive metabolic panel   5. DDD (degenerative disc disease), lumbar M51.36 EKG 12-lead complete w/read - Clinics     CBC with platelets and differential     Comprehensive metabolic panel   6. CKD (chronic kidney disease) stage 3, GFR 30-59 ml/min (H) N18.3 EKG 12-lead complete w/read - Clinics     CBC with platelets and differential     Comprehensive metabolic panel   7. Bilateral impacted cerumen H61.23        RECOMMENDATIONS:     --Patient is to take all scheduled medications on the day of surgery EXCEPT for modifications listed below.    Pending review of diagnostic evaluation, APPROVAL GIVEN to proceed with proposed procedure, without further diagnostic evaluation.       Signed Electronically by: Phani Jason PA-C    Copy of this evaluation report is provided to requesting physician.    [place] Preop Guidelines    Revised Cardiac Risk Index

## 2020-01-09 NOTE — NURSING NOTE
Patient Education    Education included but not limited to:  - Surgical risks: blood clots, urinating difficulties, nerve damage, infection.  - Pre-operative physical with primary care physician within 30 days of surgical date.   - Pre-operative clearance from other pertaining specialties.   - Discontinue NSAIDS x 7 days prior to surgical date.     -Do not begin taking NSAIDs (Advil, Motrin, Ibuprofen, Nuprin, Diclofenac, Meloxicam, Aleve, Celebrex, Aspirin, etc.) until 6 weeks after surgery if you had a fusion. May cause bleeding and interfere with bone healing.    -May try Tylenol for pain.  - Smoking cessation: if you smoke, we advise you to stop immediately, through recovery to improve healing. Smoking Cessation handout provided.  -Discussed being off work after surgery, short term disability, FMLA, etc.   -Forms to be completed    -Pre-op timeline: NPO, shower, medications    -Hospital stay: Checking in, surgery, recovery room, hospital room.    - Post operative pain management: narcotics, muscle relaxants, ice, etc.   -No driving while taking narcotics     -Post operative incision care:   Keep your incision clean and dry.   Okay to shower. No submerging in water until incision healed.   Watch for signs of infection and notify clinic if drainage or fever develops.   - Post operative activity limitations recommended until follow up appointment: no lifting > 10 pounds; limited bending, twisting, overhead reaching.  -If a brace is required per Dr. Gomes, [facility] will fit you for the brace in the hospital.  - Follow up appointments: Suture/staple removal at 2 weeks post op (TLIF, PSF, crani).6 week post op, 3 months post op, 6 months post op, 1 year post op. You will need to an xray before each appointment. Please call to schedule follow up appointment at [phone].   - Education book was also given to the patient for further review.   [place] folder provided to patient.     Patient verbalized  understanding of above instructions. All questions were answered to the best of my ability and the patient's satisfaction. Patient advised to call with any additional questions or concerns.    Radha EMERSON RN  [facility] - [address] [address]  Telephone:  [phone]       Fax:  [phone]

## 2020-01-09 NOTE — PROGRESS NOTES
Michaela Dorman is a 67 year old female who presents for evaluation of her chief complaint of left leg and low back pain. Symptoms have been present for about 1 year, but have become more persistent over the last 4 months. She describes severe pain that radiates down the lateral aspect of her left thigh and calf, as far as the ankle. She has severe ankle pain. She even received an in injection into the left ankle, with no symptomatic improvement. She has not had any epidural injections. She is unable to stand or participate in any significant activities. She denies bowel or bladder problems, or problems with balance or coordination.    Returns today for further follow-up.  Had several days of excellent pain relief after the injection, although the pain has returned.  Scoliosis x-rays showed that the deformity is all local in the caudal lumbar region.  Her overall activity level is fairly good, she takes half an oxycodone in the morning, then is able to walk for extended distances on the treadmill.    She returns for follow up.  Progressive and worsening pain over the last 6 months.  Has done 2 more VIANNEY without durable relief.  PT as well.  Daily, severe pain, limiting her ability to walk distances, sleep at night, and pursue daily activities.  Returns for follow up.  Continued sharp back and left leg pain radiating to the foot.  Severely limiting activities such as walking the stairs and cooking.  Unable to go out and pursue a full day of activities.  Imaging with lumbar disc degeneration, loss of lordosis, and foraminal stenosis.  Extensive non-surgical management without improvement.      Past Medical History:   Diagnosis Date     Central stenosis of spinal canal 12/1/2017    lumbar      DDD (degenerative disc disease), lumbar 12/1/2017     Depressive disorder, not elsewhere classified      Esophageal reflux      ETOH abuse     in remission     Foraminal stenosis of lumbar region 12/1/2017    Complete lumbar  spine left at various degrees and to a lesser extent the right as well.     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    Past Surgical History:   Procedure Laterality Date     ARTHROPLASTY SHOULDER Right 11/17/2015     ARTHROSCOPY KNEE  6/7/2012    Procedure:ARTHROSCOPY KNEE; right knee arthroscopy with partial lateral menisectomy; Surgeon:DAMIÁN MCALLISTER; Location:[facility]     C LIGATE FALLOPIAN TUBE       C NONSPECIFIC PROCEDURE  1956    ankle fracture surgery     COLONOSCOPY N/A 12/22/2017    Procedure: COLONOSCOPY;  colonoscopy;  Surgeon: Chuck Chand MD;  [facility]     EXCISE MASS AXILLA Left 9/16/2016    Procedure: EXCISE MASS AXILLA;  Surgeon: Keyon Blanco MD;  [facility]     HC REMV CATARACT EXTRACAP,INSERT LENS, W/O ECP  6/25/2009    Right eye     INJECT EPIDURAL LUMBAR N/A 4/11/2019    Procedure: interlaminar epidual steroid injection lumbar 4-5;  Surgeon: Oskar Salvador MD;  [facility]     INJECT EPIDURAL LUMBAR Bilateral 9/12/2019    Procedure: lumbar 4-5 epidural interlaminar steroid injection;  Surgeon: Oskar Salvador MD;  [facility]     INSERT PORT VASCULAR ACCESS Right 10/7/2016    Procedure: INSERT PORT VASCULAR ACCESS;  Surgeon: Keyon Blanco MD;  [facility]     MASTECTOMY SIMPLE BILATERAL Bilateral 2/8/2017    Procedure: MASTECTOMY SIMPLE BILATERAL;  Surgeon: Keyon Blanco MD;  [facility]     OPEN REDUCTION INTERNAL FIXATION FOOT Right 3/20/2015    Procedure: OPEN REDUCTION INTERNAL FIXATION FOOT;  Surgeon: Javi Herrmann DPM;  [facility]     REMOVE PORT VASCULAR ACCESS Right 2/14/2018    Procedure: REMOVE PORT VASCULAR ACCESS;  right intra jugular port removal;  Surgeon: Keyon Blanco MD;  [facility]     SHOULDER SURGERY Right 11/2015     Past Surgical History reviewed with patient during visit.    Current Outpatient Medications   Medication     acetaminophen (TYLENOL EX ST ARTHRITIS PAIN) 500 MG tablet     ALPRAZolam (XANAX) 0.5 MG tablet     atorvastatin (LIPITOR) 20 MG tablet     escitalopram (LEXAPRO) 20 MG tablet     hydrochlorothiazide (HYDRODIURIL) 25 MG tablet     hydrocortisone 2.5 % ointment     Lansoprazole (PREVACID PO)     NONFORMULARY     oxyCODONE-acetaminophen (PERCOCET) 5-325 MG tablet     prochlorperazine (COMPAZINE) 10 MG tablet     tamsulosin (FLOMAX) 0.4 MG capsule     No current facility-administered medications for this visit.        Allergies   Allergen Reactions     No Known Drug Allergies        Social History     Social History     Marital status:      Spouse name: ozzie     Number of children: 5     Years of education: N/A     Occupational History      Homemaker     Social History Main Topics     Smoking status: Former Smoker     Packs/day: 3.00     Years: 10.00     Types: Cigarettes     Quit date: 12/1/1979     Smokeless tobacco: Never Used      Comment: approx. 3 packs daily     Alcohol use 0.0 oz/week     0 Standard drinks or equivalent per week      Comment: daily glass of wine     Drug use: No     Sexual activity: Yes     Partners: Male     Other Topics Concern     Caffeine Concern No     Hobby Hazards No     Sleep Concern No     Stress Concern Yes     Exercise Yes     Seat Belt Yes     Social History Narrative       Family History   Problem Relation Age of Onset     Hypertension Mother      Thyroid Disease Mother      Cerebrovascular Disease Mother      Hypertension Father      Cerebrovascular Disease Father      Cancer Father         skin     Thyroid Disease Sister      Diabetes Brother         type 2     Depression Sister      Breast Cancer Sister         age 47     Cancer Sister         skin     Cancer Brother         inside nose          ROS: 10 point ROS neg  "other than the symptoms noted above in the HPI.    Vital Signs: /58   Temp 98  F (36.7  C) (Temporal)   Ht 1.676 m (5' 6\")   Wt 62.6 kg (138 lb)   BMI 22.27 kg/m      Examination:  Constitutional:  Alert, well nourished, NAD.  HEENT: Normocephalic, atraumatic.   Pulmonary:  Without shortness of breath, normal effort.   Lymph: no lymphadenopathy to low back or LE.   Integumentary: Skin is free of rashes or lesions.   Cardiovascular:  No pitting edema of BLE.    Psych: Normal affect, no apparent distress    Neurological:  Awake  Alert  Oriented x 3  Speech clear  Cranial nerves II - XII grossly intact  Motor exam   Hip Flexor:                Right: 5/5  Left:  5/5  Hip Adductor:             Right:  5/5  Left:  5/5  Hip Abductor:             Right:  5/5  Left:  5/5  Gastroc Soleus:        Right:  5/5  Left:  5/5  Tib/Ant:                      Right:  5/5  Left:  5/5  EHL:                          Right:  5/5  Left:  5/5       Numbness to left posterior calf and ankle    Reflexes are 2+ in the patellar and Achilles. There is no clonus. Downgoing Babinski.    Musculoskeletal:  Gait: Able to stand from a seated position. Normal non-antalgic, non-myelopathic gait.  Able to heel/toe walk without loss of balance  She is tender palpation of the lumbar paraspinous musculature.    Imaging:   MRI of the lumbar spine was reviewed in the office today. She does have multiple levels of disc degeneration and disc bulging from L2 through S1. She has multiple levels of moderate to severe foraminal stenosis from L4 through S1. Disc degeneration is most significant at L4-5 and L5-S1, where there is loss of disc height and erosion of the endplates.    Assessment/Plan:     Low back pain  Radicular left leg pain  Lumbar disc degeneration multilevel  Lumbar stenosis    Discussed L2-S1 PSF  Will obtain U/S Carotid per patient concern  Will obtain CT Lumbar and XR Scoli  Risks and benefits discussed  "

## 2020-01-09 NOTE — LETTER
1/9/2020         RE: Michaela Dorman  [address]  [facility] [address]        Dear Colleague,    Thank you for referring your patient, Michaela Dorman, to the [facility]. Please see a copy of my visit note below.    Michaela Dorman is a 67 year old female who presents for evaluation of her chief complaint of left leg and low back pain. Symptoms have been present for about 1 year, but have become more persistent over the last 4 months. She describes severe pain that radiates down the lateral aspect of her left thigh and calf, as far as the ankle. She has severe ankle pain. She even received an in injection into the left ankle, with no symptomatic improvement. She has not had any epidural injections. She is unable to stand or participate in any significant activities. She denies bowel or bladder problems, or problems with balance or coordination.    Returns today for further follow-up.  Had several days of excellent pain relief after the injection, although the pain has returned.  Scoliosis x-rays showed that the deformity is all local in the caudal lumbar region.  Her overall activity level is fairly good, she takes half an oxycodone in the morning, then is able to walk for extended distances on the treadmill.    She returns for follow up.  Progressive and worsening pain over the last 6 months.  Has done 2 more VIANNEY without durable relief.  PT as well.  Daily, severe pain, limiting her ability to walk distances, sleep at night, and pursue daily activities.  Returns for follow up.  Continued sharp back and left leg pain radiating to the foot.  Severely limiting activities such as walking the stairs and cooking.  Unable to go out and pursue a full day of activities.  Imaging with lumbar disc degeneration, loss of lordosis, and foraminal stenosis.  Extensive non-surgical management without improvement.      Past Medical History:   Diagnosis Date     Central stenosis of spinal canal 12/1/2017     lumbar      DDD (degenerative disc disease), lumbar 12/1/2017     Depressive disorder, not elsewhere classified      Esophageal reflux      ETOH abuse     in remission     Foraminal stenosis of lumbar region 12/1/2017    Complete lumbar spine left at various degrees and to a lesser extent the right as well.     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    Past Surgical History:   Procedure Laterality Date     ARTHROPLASTY SHOULDER Right 11/17/2015     ARTHROSCOPY KNEE  6/7/2012    Procedure:ARTHROSCOPY KNEE; right knee arthroscopy with partial lateral menisectomy; Surgeon:DAMIÁN MCALLISTER; [facility]     C LIGATE FALLOPIAN TUBE       C NONSPECIFIC PROCEDURE  1956    ankle fracture surgery     COLONOSCOPY N/A 12/22/2017    Procedure: COLONOSCOPY;  colonoscopy;  Surgeon: Chuck Chand MD;  [facility]     EXCISE MASS AXILLA Left 9/16/2016    Procedure: EXCISE MASS AXILLA;  Surgeon: Keyon Blanco MD;  [facility]     HC REMV CATARACT EXTRACAP,INSERT LENS, W/O ECP  6/25/2009    Right eye     INJECT EPIDURAL LUMBAR N/A 4/11/2019    Procedure: interlaminar epidual steroid injection lumbar 4-5;  Surgeon: Oskar Salvador MD;  [facility]     INJECT EPIDURAL LUMBAR Bilateral 9/12/2019    Procedure: lumbar 4-5 epidural interlaminar steroid injection;  Surgeon: Oskar Salvador MD;  [facility]     INSERT PORT VASCULAR ACCESS Right 10/7/2016    Procedure: INSERT PORT VASCULAR ACCESS;  Surgeon: Keyon Blanco MD;  [facility]     MASTECTOMY SIMPLE BILATERAL Bilateral 2/8/2017    Procedure: MASTECTOMY SIMPLE BILATERAL;  Surgeon: Keyon Blanco MD;  [facility]     OPEN REDUCTION INTERNAL FIXATION FOOT Right 3/20/2015    Procedure: OPEN REDUCTION  INTERNAL FIXATION FOOT;  Surgeon: Javi Herrmann DPM;  [facility]     REMOVE PORT VASCULAR ACCESS Right 2/14/2018    Procedure: REMOVE PORT VASCULAR ACCESS;  right intra jugular port removal;  Surgeon: Keyon Blanco MD;  [facility]     SHOULDER SURGERY Right 11/2015     Past Surgical History reviewed with patient during visit.    Current Outpatient Medications   Medication     acetaminophen (TYLENOL EX ST ARTHRITIS PAIN) 500 MG tablet     ALPRAZolam (XANAX) 0.5 MG tablet     atorvastatin (LIPITOR) 20 MG tablet     escitalopram (LEXAPRO) 20 MG tablet     hydrochlorothiazide (HYDRODIURIL) 25 MG tablet     hydrocortisone 2.5 % ointment     Lansoprazole (PREVACID PO)     NONFORMULARY     oxyCODONE-acetaminophen (PERCOCET) 5-325 MG tablet     prochlorperazine (COMPAZINE) 10 MG tablet     tamsulosin (FLOMAX) 0.4 MG capsule     No current facility-administered medications for this visit.        Allergies   Allergen Reactions     No Known Drug Allergies        Social History     Social History     Marital status:      Spouse name: ozzie     Number of children: 5     Years of education: N/A     Occupational History      Homemaker     Social History Main Topics     Smoking status: Former Smoker     Packs/day: 3.00     Years: 10.00     Types: Cigarettes     Quit date: 12/1/1979     Smokeless tobacco: Never Used      Comment: approx. 3 packs daily     Alcohol use 0.0 oz/week     0 Standard drinks or equivalent per week      Comment: daily glass of wine     Drug use: No     Sexual activity: Yes     Partners: Male     Other Topics Concern     Caffeine Concern No     Hobby Hazards No     Sleep Concern No     Stress Concern Yes     Exercise Yes     Seat Belt Yes     Social History Narrative       Family History   Problem Relation Age of Onset     Hypertension Mother      Thyroid Disease Mother      Cerebrovascular Disease Mother      Hypertension Father      Cerebrovascular Disease Father      Cancer  "Father         skin     Thyroid Disease Sister      Diabetes Brother         type 2     Depression Sister      Breast Cancer Sister         age 47     Cancer Sister         skin     Cancer Brother         inside nose          ROS: 10 point ROS neg other than the symptoms noted above in the HPI.    Vital Signs: /58   Temp 98  F (36.7  C) (Temporal)   Ht 1.676 m (5' 6\")   Wt 62.6 kg (138 lb)   BMI 22.27 kg/m       Examination:  Constitutional:  Alert, well nourished, NAD.  HEENT: Normocephalic, atraumatic.   Pulmonary:  Without shortness of breath, normal effort.   Lymph: no lymphadenopathy to low back or LE.   Integumentary: Skin is free of rashes or lesions.   Cardiovascular:  No pitting edema of BLE.    Psych: Normal affect, no apparent distress    Neurological:  Awake  Alert  Oriented x 3  Speech clear  Cranial nerves II - XII grossly intact  Motor exam   Hip Flexor:                Right: 5/5  Left:  5/5  Hip Adductor:             Right:  5/5  Left:  5/5  Hip Abductor:             Right:  5/5  Left:  5/5  Gastroc Soleus:        Right:  5/5  Left:  5/5  Tib/Ant:                      Right:  5/5  Left:  5/5  EHL:                          Right:  5/5  Left:  5/5       Numbness to left posterior calf and ankle    Reflexes are 2+ in the patellar and Achilles. There is no clonus. Downgoing Babinski.    Musculoskeletal:  Gait: Able to stand from a seated position. Normal non-antalgic, non-myelopathic gait.  Able to heel/toe walk without loss of balance  She is tender palpation of the lumbar paraspinous musculature.    Imaging:   MRI of the lumbar spine was reviewed in the office today. She does have multiple levels of disc degeneration and disc bulging from L2 through S1. She has multiple levels of moderate to severe foraminal stenosis from L4 through S1. Disc degeneration is most significant at L4-5 and L5-S1, where there is loss of disc height and erosion of the endplates.    Assessment/Plan:     Low back " pain  Radicular left leg pain  Lumbar disc degeneration multilevel  Lumbar stenosis    Discussed L2-S1 PSF  Will obtain U/S Carotid per patient concern  Will obtain CT Lumbar and XR Scoli  Risks and benefits discussed    Again, thank you for allowing me to participate in the care of your patient.        Sincerely,        Lenny Gomes MD

## 2020-01-13 ENCOUNTER — OFFICE VISIT (OUTPATIENT)
Dept: FAMILY MEDICINE | Facility: OTHER | Age: 70
End: 2020-01-13
Payer: COMMERCIAL

## 2020-01-13 VITALS
BODY MASS INDEX: 21.35 KG/M2 | TEMPERATURE: 98.3 F | OXYGEN SATURATION: 97 % | DIASTOLIC BLOOD PRESSURE: 62 MMHG | SYSTOLIC BLOOD PRESSURE: 100 MMHG | HEART RATE: 78 BPM | HEIGHT: 67 IN | RESPIRATION RATE: 16 BRPM | WEIGHT: 136 LBS

## 2020-01-13 DIAGNOSIS — M51.369 DDD (DEGENERATIVE DISC DISEASE), LUMBAR: ICD-10-CM

## 2020-01-13 DIAGNOSIS — I10 HYPERTENSION GOAL BP (BLOOD PRESSURE) < 140/90: ICD-10-CM

## 2020-01-13 DIAGNOSIS — H61.23 BILATERAL IMPACTED CERUMEN: ICD-10-CM

## 2020-01-13 DIAGNOSIS — N18.30 CKD (CHRONIC KIDNEY DISEASE) STAGE 3, GFR 30-59 ML/MIN (H): ICD-10-CM

## 2020-01-13 DIAGNOSIS — M48.061 FORAMINAL STENOSIS OF LUMBAR REGION: ICD-10-CM

## 2020-01-13 DIAGNOSIS — M54.16 LUMBAR RADICULOPATHY: ICD-10-CM

## 2020-01-13 DIAGNOSIS — Z01.818 PREOP GENERAL PHYSICAL EXAM: Primary | ICD-10-CM

## 2020-01-13 LAB
ALBUMIN SERPL-MCNC: 4.2 G/DL (ref 3.4–5)
ALP SERPL-CCNC: 71 U/L (ref 40–150)
ALT SERPL W P-5'-P-CCNC: 111 U/L (ref 0–50)
ANION GAP SERPL CALCULATED.3IONS-SCNC: 4 MMOL/L (ref 3–14)
AST SERPL W P-5'-P-CCNC: 55 U/L (ref 0–45)
BASOPHILS # BLD AUTO: 0 10E9/L (ref 0–0.2)
BASOPHILS NFR BLD AUTO: 0.3 %
BILIRUB SERPL-MCNC: 0.3 MG/DL (ref 0.2–1.3)
BUN SERPL-MCNC: 21 MG/DL (ref 7–30)
CALCIUM SERPL-MCNC: 9.8 MG/DL (ref 8.5–10.1)
CHLORIDE SERPL-SCNC: 102 MMOL/L (ref 94–109)
CO2 SERPL-SCNC: 31 MMOL/L (ref 20–32)
CREAT SERPL-MCNC: 0.98 MG/DL (ref 0.52–1.04)
DIFFERENTIAL METHOD BLD: ABNORMAL
EOSINOPHIL # BLD AUTO: 0.2 10E9/L (ref 0–0.7)
EOSINOPHIL NFR BLD AUTO: 3 %
ERYTHROCYTE [DISTWIDTH] IN BLOOD BY AUTOMATED COUNT: 13 % (ref 10–15)
GFR SERPL CREATININE-BSD FRML MDRD: 58 ML/MIN/{1.73_M2}
GLUCOSE SERPL-MCNC: 89 MG/DL (ref 70–99)
HCT VFR BLD AUTO: 38.2 % (ref 35–47)
HGB BLD-MCNC: 12.1 G/DL (ref 11.7–15.7)
LYMPHOCYTES # BLD AUTO: 1.5 10E9/L (ref 0.8–5.3)
LYMPHOCYTES NFR BLD AUTO: 21.2 %
MCH RBC QN AUTO: 32.3 PG (ref 26.5–33)
MCHC RBC AUTO-ENTMCNC: 31.7 G/DL (ref 31.5–36.5)
MCV RBC AUTO: 102 FL (ref 78–100)
MONOCYTES # BLD AUTO: 0.6 10E9/L (ref 0–1.3)
MONOCYTES NFR BLD AUTO: 8.2 %
NEUTROPHILS # BLD AUTO: 4.9 10E9/L (ref 1.6–8.3)
NEUTROPHILS NFR BLD AUTO: 67.3 %
PLATELET # BLD AUTO: 266 10E9/L (ref 150–450)
POTASSIUM SERPL-SCNC: 4.1 MMOL/L (ref 3.4–5.3)
PROT SERPL-MCNC: 7.5 G/DL (ref 6.8–8.8)
RBC # BLD AUTO: 3.75 10E12/L (ref 3.8–5.2)
SODIUM SERPL-SCNC: 137 MMOL/L (ref 133–144)
WBC # BLD AUTO: 7.2 10E9/L (ref 4–11)

## 2020-01-13 PROCEDURE — 93000 ELECTROCARDIOGRAM COMPLETE: CPT | Performed by: PHYSICIAN ASSISTANT

## 2020-01-13 PROCEDURE — 36415 COLL VENOUS BLD VENIPUNCTURE: CPT | Performed by: PHYSICIAN ASSISTANT

## 2020-01-13 PROCEDURE — 90662 IIV NO PRSV INCREASED AG IM: CPT | Performed by: PHYSICIAN ASSISTANT

## 2020-01-13 PROCEDURE — 99214 OFFICE O/P EST MOD 30 MIN: CPT | Mod: 25 | Performed by: PHYSICIAN ASSISTANT

## 2020-01-13 PROCEDURE — 80053 COMPREHEN METABOLIC PANEL: CPT | Performed by: PHYSICIAN ASSISTANT

## 2020-01-13 PROCEDURE — 85025 COMPLETE CBC W/AUTO DIFF WBC: CPT | Performed by: PHYSICIAN ASSISTANT

## 2020-01-13 PROCEDURE — G0008 ADMIN INFLUENZA VIRUS VAC: HCPCS | Performed by: PHYSICIAN ASSISTANT

## 2020-01-13 PROCEDURE — 69209 REMOVE IMPACTED EAR WAX UNI: CPT | Performed by: PHYSICIAN ASSISTANT

## 2020-01-13 ASSESSMENT — MIFFLIN-ST. JEOR: SCORE: 1169.52

## 2020-01-14 ENCOUNTER — HOSPITAL ENCOUNTER (OUTPATIENT)
Dept: ULTRASOUND IMAGING | Facility: CLINIC | Age: 70
End: 2020-01-14
Attending: NEUROLOGICAL SURGERY
Payer: MEDICARE

## 2020-01-14 ENCOUNTER — TELEPHONE (OUTPATIENT)
Dept: NEUROSURGERY | Facility: CLINIC | Age: 70
End: 2020-01-14

## 2020-01-14 ENCOUNTER — MYC MEDICAL ADVICE (OUTPATIENT)
Dept: FAMILY MEDICINE | Facility: OTHER | Age: 70
End: 2020-01-14

## 2020-01-14 ENCOUNTER — HOSPITAL ENCOUNTER (OUTPATIENT)
Dept: GENERAL RADIOLOGY | Facility: CLINIC | Age: 70
Discharge: HOME | End: 2020-01-14
Attending: NEUROLOGICAL SURGERY | Admitting: NEUROLOGICAL SURGERY
Payer: MEDICARE

## 2020-01-14 ENCOUNTER — HOSPITAL ENCOUNTER (OUTPATIENT)
Dept: CT IMAGING | Facility: CLINIC | Age: 70
End: 2020-01-14
Attending: NEUROLOGICAL SURGERY
Payer: MEDICARE

## 2020-01-14 DIAGNOSIS — M51.9 DISC DISORDER OF LUMBAR REGION: ICD-10-CM

## 2020-01-14 DIAGNOSIS — I10 HTN (HYPERTENSION): ICD-10-CM

## 2020-01-14 DIAGNOSIS — E78.5 HYPERLIPIDEMIA: ICD-10-CM

## 2020-01-14 DIAGNOSIS — R26.81 UNSTEADINESS ON FEET: ICD-10-CM

## 2020-01-14 PROCEDURE — 72082 X-RAY EXAM ENTIRE SPI 2/3 VW: CPT | Mod: TC

## 2020-01-14 PROCEDURE — 72131 CT LUMBAR SPINE W/O DYE: CPT

## 2020-01-14 PROCEDURE — 93880 EXTRACRANIAL BILAT STUDY: CPT

## 2020-01-14 NOTE — TELEPHONE ENCOUNTER
"Per Dr. Gomes, \"US Carotid shows mild/moderate disease, but nothing we would need to intervene on now.  Scolis shows that the deformity is all lumbar.  OK to proceed with surgery as currently booked for 2/4. \"   Calling to discuss with patient. Relayed results. No questions, stated thanks for call.  "

## 2020-01-18 DIAGNOSIS — G89.4 CHRONIC PAIN SYNDROME: ICD-10-CM

## 2020-01-18 DIAGNOSIS — F43.21 ADJUSTMENT DISORDER WITH DEPRESSED MOOD: ICD-10-CM

## 2020-01-20 RX ORDER — ESCITALOPRAM OXALATE 20 MG/1
TABLET ORAL
Qty: 90 TABLET | Refills: 1 | Status: SHIPPED | OUTPATIENT
Start: 2020-01-20 | End: 2020-06-16

## 2020-01-20 NOTE — TELEPHONE ENCOUNTER
Pending Prescriptions:                       Disp   Refills    escitalopram (LEXAPRO) 20 MG tablet [Pharm*90 tab*1        Sig: TAKE ONE TABLET BY MOUTH ONCE DAILY    Routing refill request to provider for review/approval because:  PHQ-9 SCORE 3/21/2019 4/8/2019 9/9/2019   PHQ-9 Total Score - - -   PHQ-9 Total Score MyChart 10 (Moderate depression) 4 (Minimal depression) Incomplete   PHQ-9 Total Score 10 4 -

## 2020-01-22 ENCOUNTER — MYC MEDICAL ADVICE (OUTPATIENT)
Dept: FAMILY MEDICINE | Facility: OTHER | Age: 70
End: 2020-01-22

## 2020-01-22 DIAGNOSIS — M51.369 DDD (DEGENERATIVE DISC DISEASE), LUMBAR: ICD-10-CM

## 2020-01-22 DIAGNOSIS — G89.4 CHRONIC PAIN SYNDROME: ICD-10-CM

## 2020-01-23 ENCOUNTER — OFFICE VISIT (OUTPATIENT)
Dept: FAMILY MEDICINE | Facility: OTHER | Age: 70
End: 2020-01-23
Payer: COMMERCIAL

## 2020-01-23 ENCOUNTER — MYC MEDICAL ADVICE (OUTPATIENT)
Dept: FAMILY MEDICINE | Facility: OTHER | Age: 70
End: 2020-01-23

## 2020-01-23 VITALS
BODY MASS INDEX: 21.75 KG/M2 | HEART RATE: 80 BPM | DIASTOLIC BLOOD PRESSURE: 64 MMHG | SYSTOLIC BLOOD PRESSURE: 110 MMHG | OXYGEN SATURATION: 97 % | HEIGHT: 67 IN | RESPIRATION RATE: 16 BRPM | TEMPERATURE: 98.1 F | WEIGHT: 138.6 LBS

## 2020-01-23 DIAGNOSIS — I10 ESSENTIAL HYPERTENSION: ICD-10-CM

## 2020-01-23 DIAGNOSIS — F43.21 ADJUSTMENT DISORDER WITH DEPRESSED MOOD: Primary | ICD-10-CM

## 2020-01-23 DIAGNOSIS — F41.9 ANXIETY: ICD-10-CM

## 2020-01-23 DIAGNOSIS — E78.5 HYPERLIPIDEMIA LDL GOAL <130: ICD-10-CM

## 2020-01-23 PROCEDURE — 99213 OFFICE O/P EST LOW 20 MIN: CPT | Performed by: PHYSICIAN ASSISTANT

## 2020-01-23 RX ORDER — OXYCODONE AND ACETAMINOPHEN 5; 325 MG/1; MG/1
1-2 TABLET ORAL EVERY 6 HOURS PRN
Qty: 30 TABLET | Refills: 0 | Status: ON HOLD | OUTPATIENT
Start: 2020-01-23 | End: 2020-02-12

## 2020-01-23 RX ORDER — ATORVASTATIN CALCIUM 20 MG/1
20 TABLET, FILM COATED ORAL DAILY
Qty: 90 TABLET | Refills: 1 | Status: SHIPPED | OUTPATIENT
Start: 2020-01-23 | End: 2020-10-15

## 2020-01-23 ASSESSMENT — PAIN SCALES - GENERAL: PAINLEVEL: MODERATE PAIN (5)

## 2020-01-23 ASSESSMENT — MIFFLIN-ST. JEOR: SCORE: 1181.32

## 2020-01-23 NOTE — TELEPHONE ENCOUNTER
With your multiple concerns Michaela I think it is best for you to come in and make an appointment so we can talk this through further.  Electronically signed:    Phani Jason PA-C

## 2020-01-23 NOTE — TELEPHONE ENCOUNTER
Spoke to patient.     BP was taken at 8 am 64/44.   BP @ 9:55 79/49.   .    This has been going of for about 6 months.   Getting worse.  Lasts about an hour an goes away. Now the dizziness lasts into the afternoon.   Blood pressure medication was taken this morning at 7:30.   Not dizzy right now. Doesn't feel like she is going to faint.   From sitting to standing, takes a few minutes to not feel dizzy. Then she can walk around okay.    Eating and drinking well.     RN advised to be seen within 24 hours. RN advised to seek emergency care if BP stays low, and patient feels like fainting, has cool clammy skin, or heart starts racing. Patient states understanding.     Next 5 appointments (look out 90 days)    Jan 23, 2020  2:40 PM CST  Office Visit with Phani Tapia PA-C  [facility] ([address] [address]  [phone]   Feb 20, 2020 11:00 AM CST  Return Visit with Loren Felipe MD  [facility] ([facility]) [address] [place] [id number]  [phone]   Feb 21, 2020 12:45 PM CST  Return Visit with Ph Neuro Nurse  [facility] ([facility] [address] [place] [id number]  [phone]   Mar 20, 2020 10:00 AM CDT  Return Visit with Venkat Shipley PA-C  [facility] ([address] [address]  [phone]            Responded via [employer].     Wendy Villatoro RN BSN

## 2020-01-23 NOTE — PROGRESS NOTES
Subjective     Michaela Dorman is a 69 year old female who presents to clinic today for the following health issues:    HPI   Dizziness  Onset: Last summer    Description:   Do you feel faint:  YES  Does it feel like the surroundings (bed, room) are moving: YES- Feels like they are closing in, black   Unsteady/off balance: YES  Have you passed out or fallen: no     Intensity: severe    Progression of Symptoms:  worsening    Accompanying Signs & Symptoms:  Heart palpitations: no   Nausea, vomiting: no   Weakness in arms or legs: YES  Fatigue: YES  Vision or speech changes: YES- Sometimes right eye is worse   Ringing in ears (Tinnitus): YES- Not a new issue  Hearing Loss: no     History:   Head trauma/concussion hx: YES- Not recent  Previous similar symptoms: no   Recent bleeding history: no     Precipitating factors:   Worse with activity or head movement: no   Any new medications (BP?): no   Alcohol/drug abuse/withdrawal: no     Alleviating factors:   Does staying in a fixed position give relief:  YES    Therapies Tried and outcome: Meclozine,     Patient Active Problem List   Diagnosis     Enthesopathy of hip region     Family history of stroke (cerebrovascular)     Trochanteric bursitis     Advanced directives, counseling/discussion     Health Care Home     Hypertension goal BP (blood pressure) < 140/90     Baker's cyst of knee     Patella-femoral syndrome     Adjustment disorder with depressed mood     Essential hypertension     Malignant neoplasm of upper-outer quadrant of left female breast (H)     Encounter for antineoplastic chemotherapy     S/P bilateral mastectomy     Anxiety     Hyperlipidemia LDL goal <130     S/P reverse total shoulder arthroplasty, right     Prophylactic antibiotic for dental procedure indicated due to prior joint replacement     Chronic pain syndrome     Lumbar radiculopathy     Foraminal stenosis of lumbar region     Central stenosis of spinal canal     DDD (degenerative disc  disease), lumbar     CKD (chronic kidney disease) stage 3, GFR 30-59 ml/min (H)     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)     Magallanes's neuroma of right foot     Bilateral impacted cerumen     Past Surgical History:   Procedure Laterality Date     ARTHROPLASTY SHOULDER Right 11/17/2015     ARTHROSCOPY KNEE  6/7/2012    Procedure:ARTHROSCOPY KNEE; right knee arthroscopy with partial lateral menisectomy; Surgeon:DAMIÁN MCALLISTER; [facility]     C LIGATE FALLOPIAN TUBE       C NONSPECIFIC PROCEDURE  1956    ankle fracture surgery     COLONOSCOPY N/A 12/22/2017    Procedure: COLONOSCOPY;  colonoscopy;  Surgeon: Chuck Chand MD;  [facility]     EXCISE MASS AXILLA Left 9/16/2016    Procedure: EXCISE MASS AXILLA;  Surgeon: Keyon Blanco MD;  [facility]     HC REMV CATARACT EXTRACAP,INSERT LENS, W/O ECP  6/25/2009    Right eye     INJECT EPIDURAL LUMBAR N/A 4/11/2019    Procedure: interlaminar epidual steroid injection lumbar 4-5;  Surgeon: Oskar Salvador MD;  [facility]     INJECT EPIDURAL LUMBAR Bilateral 9/12/2019    Procedure: lumbar 4-5 epidural interlaminar steroid injection;  Surgeon: Oskar Salvador MD;  [facility]     INSERT PORT VASCULAR ACCESS Right 10/7/2016    Procedure: INSERT PORT VASCULAR ACCESS;  Surgeon: Keyon Blanco MD;  [facility]     MASTECTOMY SIMPLE BILATERAL Bilateral 2/8/2017    Procedure: MASTECTOMY SIMPLE BILATERAL;  Surgeon: Keyon Blanco MD;  [facility]     OPEN REDUCTION INTERNAL FIXATION FOOT Right 3/20/2015    Procedure: OPEN REDUCTION INTERNAL FIXATION FOOT;  Surgeon: Javi Herrmann DPM;  [facility]     REMOVE PORT VASCULAR ACCESS Right 2/14/2018    Procedure: REMOVE PORT VASCULAR ACCESS;  right intra jugular port removal;  Surgeon: Keyon Blanco MD;  [facility]     SHOULDER SURGERY Right 11/2015       Social History     Tobacco Use     Smoking status: Former Smoker     Packs/day:  3.00     Years: 10.00     Pack years: 30.00     Types: Cigarettes     Last attempt to quit: 1979     Years since quittin.1     Smokeless tobacco: Never Used     Tobacco comment: approx. 3 packs daily   Substance Use Topics     Alcohol use: Yes     Alcohol/week: 0.0 standard drinks     Comment: daily glass of wine     Family History   Problem Relation Age of Onset     Hypertension Mother      Thyroid Disease Mother      Cerebrovascular Disease Mother      Hypertension Father      Cerebrovascular Disease Father      Cancer Father         skin     Thyroid Disease Sister      Diabetes Brother         type 2     Depression Sister      Breast Cancer Sister         age 47     Cancer Sister         skin     Cancer Brother         inside nose         Current Outpatient Medications   Medication Sig Dispense Refill     acetaminophen (TYLENOL EX ST ARTHRITIS PAIN) 500 MG tablet Take 500-1,000 mg by mouth every 6 hours as needed for mild pain       ALPRAZolam (XANAX) 0.5 MG tablet TAKE ONE TABLET BY MOUTH THREE TIMES A DAY AS NEEDED FOR ANXIETY -- (NEED TO BE SEEN IN CLINIC FOR FURTHER REFILLS) 30 tablet 0     atorvastatin (LIPITOR) 20 MG tablet Take 1 tablet (20 mg) by mouth daily 90 tablet 1     Cyanocobalamin (VITAMIN B 12 PO)        escitalopram (LEXAPRO) 20 MG tablet TAKE ONE TABLET BY MOUTH ONCE DAILY 90 tablet 1     Lansoprazole (PREVACID PO) Take 40 mg by mouth       Multiple Vitamins-Minerals (VITAMIN D3 COMPLETE PO)        oxyCODONE-acetaminophen (PERCOCET) 5-325 MG tablet Take 1-2 tablets by mouth every 6 hours as needed for moderate to severe pain (#30 to last 30 days) 30 tablet 0     prochlorperazine (COMPAZINE) 10 MG tablet TAKE ONE TABLET BY MOUTH EVERY 6 HOURS AS NEEDED (NAUSEA/VOMITING) 30 tablet 3     tamsulosin (FLOMAX) 0.4 MG capsule Take 2 capsules (0.8 mg) by mouth daily 180 capsule 1     Allergies   Allergen Reactions     No Known Drug Allergies      Recent Labs   Lab Test 20  1206  "11/14/19  1255 05/10/19  0913 03/25/19  0851  09/10/18  0828  09/18/17  0840  01/24/17  1016  08/31/16  1442   A1C  --   --   --   --   --   --   --   --   --  5.0  --   --    LDL  --   --   --  87  --  108*  --  170*  --   --   --  150*   HDL  --   --   --  101  --  90  --  67  --   --   --   --    TRIG  --   --   --  70  --  89  --  178*  --   --   --   --    * 40 35 37   < >  --    < > 25   < > 49   < >  --    CR 0.98 0.98 1.45* 1.07*   < >  --    < > 0.98   < > 0.92   < >  --    GFRESTIMATED 58* 58* 37* 53*   < >  --    < > 57*   < > 61   < >  --    GFRESTBLACK 67 68 42* 61   < >  --    < > 69   < > 73   < >  --    POTASSIUM 4.1 3.9 4.0 4.4   < >  --    < > 4.2   < > 3.9   < >  --    TSH  --   --   --   --   --   --   --   --   --  1.34  --  0.39*    < > = values in this interval not displayed.      BP Readings from Last 3 Encounters:   01/23/20 110/64   01/13/20 100/62   01/09/20 100/58    Wt Readings from Last 3 Encounters:   01/23/20 62.9 kg (138 lb 9.6 oz)   01/13/20 61.7 kg (136 lb)   01/09/20 62.6 kg (138 lb)                    Reviewed and updated as needed this visit by Provider         Review of Systems   ROS COMP: Constitutional, HEENT, cardiovascular, pulmonary, GI, , musculoskeletal, neuro, skin, endocrine and psych systems are negative, except as otherwise noted.      Objective    /64   Pulse 80   Temp 98.1  F (36.7  C) (Temporal)   Resp 16   Ht 1.702 m (5' 7\")   Wt 62.9 kg (138 lb 9.6 oz)   SpO2 97%   BMI 21.71 kg/m    Body mass index is 21.71 kg/m .  Physical Exam   GENERAL: healthy, alert and no distress  NECK: no adenopathy, no asymmetry, masses, or scars and thyroid normal to palpation  RESP: lungs clear to auscultation - no rales, rhonchi or wheezes  CV: regular rate and rhythm, normal S1 S2, no S3 or S4, no murmur, click or rub, no peripheral edema and peripheral pulses strong  ABDOMEN: soft, nontender, no hepatosplenomegaly, no masses and bowel sounds normal  MS: no " gross musculoskeletal defects noted, no edema    Diagnostic Test Results:  Labs reviewed in Epic  No results found. However, due to the size of the patient record, not all encounters were searched. Please check Results Review for a complete set of results.        Assessment & Plan     1. Essential hypertension  Stable blood pressure at this point time.  Advised that she check her home blood pressure monitoring cuff to make sure it is accurate by bringing it into the clinic and doing some comparison.  We will take away her hydrochlorothiazide at this point in time since her blood pressure seems to be fairly stable and continue to monitor.  She may indeed have some orthostatic hypotension that we need to try to work-up in the future if this continues.  Advised cessation of all types of caffeine and alcohol.  Follow-up in 2 to 3 weeks PRN.    2. Hyperlipidemia LDL goal <130  - atorvastatin (LIPITOR) 20 MG tablet; Take 1 tablet (20 mg) by mouth daily  Dispense: 90 tablet; Refill: 1    3. Adjustment disorder with depressed mood  4. Anxiety  Does admit to having some anxiety over upcoming surgery but other than that feels like she is pretty normal.  We will have to monitor this carefully as well.    Return in about 4 weeks (around 2/20/2020) for recheck of current condition, if symptoms do not improve.    Phani Jason PA-C  [facility]

## 2020-02-04 ENCOUNTER — HOSPITAL ENCOUNTER (INPATIENT)
Facility: CLINIC | Age: 70
LOS: 8 days | Discharge: HOME | DRG: 454 | End: 2020-02-12
Attending: NEUROLOGICAL SURGERY | Admitting: NEUROLOGICAL SURGERY
Payer: MEDICARE

## 2020-02-04 ENCOUNTER — APPOINTMENT (OUTPATIENT)
Dept: GENERAL RADIOLOGY | Facility: CLINIC | Age: 70
DRG: 454 | End: 2020-02-04
Attending: NEUROLOGICAL SURGERY
Payer: MEDICARE

## 2020-02-04 ENCOUNTER — ANESTHESIA EVENT (OUTPATIENT)
Dept: SURGERY | Facility: CLINIC | Age: 70
DRG: 454 | End: 2020-02-04
Payer: MEDICARE

## 2020-02-04 ENCOUNTER — ANESTHESIA (OUTPATIENT)
Dept: SURGERY | Facility: CLINIC | Age: 70
DRG: 454 | End: 2020-02-04
Payer: MEDICARE

## 2020-02-04 DIAGNOSIS — J10.1 INFLUENZA A: Primary | ICD-10-CM

## 2020-02-04 DIAGNOSIS — Z98.1 S/P LUMBAR FUSION: ICD-10-CM

## 2020-02-04 LAB
BLD PROD TYP BPU: NORMAL
BLD PROD TYP BPU: NORMAL
BLD UNIT ID BPU: 0
BLD UNIT ID BPU: 0
BLOOD PRODUCT CODE: NORMAL
BLOOD PRODUCT CODE: NORMAL
BPU ID: NORMAL
BPU ID: NORMAL
ERYTHROCYTE [DISTWIDTH] IN BLOOD BY AUTOMATED COUNT: 13.5 % (ref 10–15)
HCT VFR BLD AUTO: 22.8 % (ref 35–47)
HCT VFR BLD AUTO: 25.7 % (ref 35–47)
HCT VFR BLD AUTO: 29.6 % (ref 35–47)
HGB BLD-MCNC: 6.9 G/DL (ref 11.7–15.7)
HGB BLD-MCNC: 7.5 G/DL (ref 11.7–15.7)
HGB BLD-MCNC: 7.8 G/DL (ref 11.7–15.7)
HGB BLD-MCNC: 8.4 G/DL (ref 11.7–15.7)
HGB BLD-MCNC: 9.8 G/DL (ref 11.7–15.7)
MCH RBC QN AUTO: 32.4 PG (ref 26.5–33)
MCH RBC QN AUTO: 32.6 PG (ref 26.5–33)
MCH RBC QN AUTO: 32.8 PG (ref 26.5–33)
MCHC RBC AUTO-ENTMCNC: 32.7 G/DL (ref 31.5–36.5)
MCHC RBC AUTO-ENTMCNC: 32.9 G/DL (ref 31.5–36.5)
MCHC RBC AUTO-ENTMCNC: 33.1 G/DL (ref 31.5–36.5)
MCV RBC AUTO: 100 FL (ref 78–100)
MCV RBC AUTO: 98 FL (ref 78–100)
MCV RBC AUTO: 99 FL (ref 78–100)
PLATELET # BLD AUTO: 210 10E9/L (ref 150–450)
PLATELET # BLD AUTO: 212 10E9/L (ref 150–450)
PLATELET # BLD AUTO: 236 10E9/L (ref 150–450)
RBC # BLD AUTO: 2.29 10E12/L (ref 3.8–5.2)
RBC # BLD AUTO: 2.59 10E12/L (ref 3.8–5.2)
RBC # BLD AUTO: 3.01 10E12/L (ref 3.8–5.2)
TRANSFUSION STATUS PATIENT QL: NORMAL
WBC # BLD AUTO: 10.2 10E9/L (ref 4–11)
WBC # BLD AUTO: 10.4 10E9/L (ref 4–11)
WBC # BLD AUTO: 10.7 10E9/L (ref 4–11)

## 2020-02-04 PROCEDURE — 25000125 ZZHC RX 250

## 2020-02-04 PROCEDURE — 63048 LAM FACETEC &FORAMOT EA ADDL: CPT | Mod: AS | Performed by: PHYSICIAN ASSISTANT

## 2020-02-04 PROCEDURE — 86850 RBC ANTIBODY SCREEN: CPT | Performed by: ANESTHESIOLOGY

## 2020-02-04 PROCEDURE — 25800030 ZZH RX IP 258 OP 636: Performed by: ANESTHESIOLOGY

## 2020-02-04 PROCEDURE — 25000125 ZZHC RX 250: Performed by: NEUROLOGICAL SURGERY

## 2020-02-04 PROCEDURE — 12000000 ZZH R&B MED SURG/OB

## 2020-02-04 PROCEDURE — 71000015 ZZH RECOVERY PHASE 1 LEVEL 2 EA ADDTL HR: Performed by: NEUROLOGICAL SURGERY

## 2020-02-04 PROCEDURE — 0ST40ZZ RESECTION OF LUMBOSACRAL DISC, OPEN APPROACH: ICD-10-PCS | Performed by: NEUROLOGICAL SURGERY

## 2020-02-04 PROCEDURE — 85027 COMPLETE CBC AUTOMATED: CPT | Performed by: NEUROLOGICAL SURGERY

## 2020-02-04 PROCEDURE — 0SG30AJ FUSION OF LUMBOSACRAL JOINT WITH INTERBODY FUSION DEVICE, POSTERIOR APPROACH, ANTERIOR COLUMN, OPEN APPROACH: ICD-10-PCS | Performed by: NEUROLOGICAL SURGERY

## 2020-02-04 PROCEDURE — 25000301 ZZH OR RX SURGIFLO W/THROMBIN KIT 2ML 1991 OPNP: Performed by: NEUROLOGICAL SURGERY

## 2020-02-04 PROCEDURE — 99222 1ST HOSP IP/OBS MODERATE 55: CPT | Performed by: PHYSICIAN ASSISTANT

## 2020-02-04 PROCEDURE — 85018 HEMOGLOBIN: CPT | Performed by: ANESTHESIOLOGY

## 2020-02-04 PROCEDURE — 27210995 ZZH RX 272: Performed by: NEUROLOGICAL SURGERY

## 2020-02-04 PROCEDURE — 27810322 ZZHC OR SPINE - CAGE/SPACER/DISK/CORD/CONNECTOR OPNP: Performed by: NEUROLOGICAL SURGERY

## 2020-02-04 PROCEDURE — 37000008 ZZH ANESTHESIA TECHNICAL FEE, 1ST 30 MIN: Performed by: NEUROLOGICAL SURGERY

## 2020-02-04 PROCEDURE — 86901 BLOOD TYPING SEROLOGIC RH(D): CPT | Performed by: ANESTHESIOLOGY

## 2020-02-04 PROCEDURE — 40000170 ZZH STATISTIC PRE-PROCEDURE ASSESSMENT II: Performed by: NEUROLOGICAL SURGERY

## 2020-02-04 PROCEDURE — 37000009 ZZH ANESTHESIA TECHNICAL FEE, EACH ADDTL 15 MIN: Performed by: NEUROLOGICAL SURGERY

## 2020-02-04 PROCEDURE — 25000128 H RX IP 250 OP 636: Performed by: PHYSICIAN ASSISTANT

## 2020-02-04 PROCEDURE — C1713 ANCHOR/SCREW BN/BN,TIS/BN: HCPCS | Performed by: NEUROLOGICAL SURGERY

## 2020-02-04 PROCEDURE — 27210794 ZZH OR GENERAL SUPPLY STERILE: Performed by: NEUROLOGICAL SURGERY

## 2020-02-04 PROCEDURE — 36416 COLLJ CAPILLARY BLOOD SPEC: CPT | Performed by: PHYSICIAN ASSISTANT

## 2020-02-04 PROCEDURE — 63048 LAM FACETEC &FORAMOT EA ADDL: CPT | Performed by: NEUROLOGICAL SURGERY

## 2020-02-04 PROCEDURE — 20936 SP BONE AGRFT LOCAL ADD-ON: CPT | Performed by: NEUROLOGICAL SURGERY

## 2020-02-04 PROCEDURE — 25800030 ZZH RX IP 258 OP 636: Performed by: PHYSICIAN ASSISTANT

## 2020-02-04 PROCEDURE — 25000128 H RX IP 250 OP 636: Performed by: NEUROLOGICAL SURGERY

## 2020-02-04 PROCEDURE — 25000128 H RX IP 250 OP 636: Performed by: ANESTHESIOLOGY

## 2020-02-04 PROCEDURE — 36000077 ZZH SURGERY LEVEL 6 W FLUORO 1ST 30 MIN: Performed by: NEUROLOGICAL SURGERY

## 2020-02-04 PROCEDURE — 36415 COLL VENOUS BLD VENIPUNCTURE: CPT | Performed by: ANESTHESIOLOGY

## 2020-02-04 PROCEDURE — 0ST20ZZ RESECTION OF LUMBAR VERTEBRAL DISC, OPEN APPROACH: ICD-10-PCS | Performed by: NEUROLOGICAL SURGERY

## 2020-02-04 PROCEDURE — C1763 CONN TISS, NON-HUMAN: HCPCS | Performed by: NEUROLOGICAL SURGERY

## 2020-02-04 PROCEDURE — 22634 ARTHRD CMBN 1NTRSPC EA ADDL: CPT | Mod: AS | Performed by: PHYSICIAN ASSISTANT

## 2020-02-04 PROCEDURE — P9016 RBC LEUKOCYTES REDUCED: HCPCS | Performed by: ANESTHESIOLOGY

## 2020-02-04 PROCEDURE — 22853 INSJ BIOMECHANICAL DEVICE: CPT | Mod: AS | Performed by: PHYSICIAN ASSISTANT

## 2020-02-04 PROCEDURE — 36000075 ZZH SURGERY LEVEL 6 EA 15 ADDTL MIN: Performed by: NEUROLOGICAL SURGERY

## 2020-02-04 PROCEDURE — BR291ZZ COMPUTERIZED TOMOGRAPHY (CT SCAN) OF LUMBAR SPINE USING LOW OSMOLAR CONTRAST: ICD-10-PCS | Performed by: NEUROLOGICAL SURGERY

## 2020-02-04 PROCEDURE — 99207 ZZC CONSULT E&M CHANGED TO INITIAL LEVEL: CPT | Performed by: PHYSICIAN ASSISTANT

## 2020-02-04 PROCEDURE — P9041 ALBUMIN (HUMAN),5%, 50ML: HCPCS

## 2020-02-04 PROCEDURE — 22634 ARTHRD CMBN 1NTRSPC EA ADDL: CPT | Performed by: NEUROLOGICAL SURGERY

## 2020-02-04 PROCEDURE — 25800030 ZZH RX IP 258 OP 636

## 2020-02-04 PROCEDURE — 86923 COMPATIBILITY TEST ELECTRIC: CPT | Performed by: ANESTHESIOLOGY

## 2020-02-04 PROCEDURE — 22842 INSERT SPINE FIXATION DEVICE: CPT | Mod: AS | Performed by: PHYSICIAN ASSISTANT

## 2020-02-04 PROCEDURE — 63047 LAM FACETEC & FORAMOT LUMBAR: CPT | Mod: 59 | Performed by: NEUROLOGICAL SURGERY

## 2020-02-04 PROCEDURE — 0SG1071 FUSION OF 2 OR MORE LUMBAR VERTEBRAL JOINTS WITH AUTOLOGOUS TISSUE SUBSTITUTE, POSTERIOR APPROACH, POSTERIOR COLUMN, OPEN APPROACH: ICD-10-PCS | Performed by: NEUROLOGICAL SURGERY

## 2020-02-04 PROCEDURE — 85018 HEMOGLOBIN: CPT | Performed by: PHYSICIAN ASSISTANT

## 2020-02-04 PROCEDURE — 20930 SP BONE ALGRFT MORSEL ADD-ON: CPT | Performed by: NEUROLOGICAL SURGERY

## 2020-02-04 PROCEDURE — 22853 INSJ BIOMECHANICAL DEVICE: CPT | Performed by: NEUROLOGICAL SURGERY

## 2020-02-04 PROCEDURE — 63047 LAM FACETEC & FORAMOT LUMBAR: CPT | Mod: AS | Performed by: PHYSICIAN ASSISTANT

## 2020-02-04 PROCEDURE — 71000014 ZZH RECOVERY PHASE 1 LEVEL 2 FIRST HR: Performed by: NEUROLOGICAL SURGERY

## 2020-02-04 PROCEDURE — 25000128 H RX IP 250 OP 636

## 2020-02-04 PROCEDURE — 25000125 ZZHC RX 250: Performed by: ANESTHESIOLOGY

## 2020-02-04 PROCEDURE — 8E0WXBF COMPUTER ASSISTED PROCEDURE OF TRUNK REGION, WITH FLUOROSCOPY: ICD-10-PCS | Performed by: NEUROLOGICAL SURGERY

## 2020-02-04 PROCEDURE — 0SG3071 FUSION OF LUMBOSACRAL JOINT WITH AUTOLOGOUS TISSUE SUBSTITUTE, POSTERIOR APPROACH, POSTERIOR COLUMN, OPEN APPROACH: ICD-10-PCS | Performed by: NEUROLOGICAL SURGERY

## 2020-02-04 PROCEDURE — 86900 BLOOD TYPING SEROLOGIC ABO: CPT | Performed by: ANESTHESIOLOGY

## 2020-02-04 PROCEDURE — 40000277 XR SURGERY CARM FLUORO LESS THAN 5 MIN W STILLS

## 2020-02-04 PROCEDURE — 22633 ARTHRD CMBN 1NTRSPC LUMBAR: CPT | Mod: AS | Performed by: PHYSICIAN ASSISTANT

## 2020-02-04 PROCEDURE — 25000566 ZZH SEVOFLURANE, EA 15 MIN: Performed by: NEUROLOGICAL SURGERY

## 2020-02-04 PROCEDURE — 22842 INSERT SPINE FIXATION DEVICE: CPT | Performed by: NEUROLOGICAL SURGERY

## 2020-02-04 PROCEDURE — 01NB0ZZ RELEASE LUMBAR NERVE, OPEN APPROACH: ICD-10-PCS | Performed by: NEUROLOGICAL SURGERY

## 2020-02-04 PROCEDURE — 22633 ARTHRD CMBN 1NTRSPC LUMBAR: CPT | Performed by: NEUROLOGICAL SURGERY

## 2020-02-04 PROCEDURE — 25000132 ZZH RX MED GY IP 250 OP 250 PS 637: Performed by: PHYSICIAN ASSISTANT

## 2020-02-04 DEVICE — IMPLANTABLE DEVICE: Type: IMPLANTABLE DEVICE | Site: SPINE LUMBAR | Status: FUNCTIONAL

## 2020-02-04 DEVICE — GRAFT BONE FOAM STRIP VITOSS BIO ACTIVE 25X100X8MM 2102-1520: Type: IMPLANTABLE DEVICE | Site: SPINE LUMBAR | Status: FUNCTIONAL

## 2020-02-04 DEVICE — GRAFT DURAGEN 1X1" ID-110: Type: IMPLANTABLE DEVICE | Site: SPINE LUMBAR | Status: FUNCTIONAL

## 2020-02-04 RX ORDER — CALCIUM CARBONATE/VITAMIN D3 500-10/5ML
30 LIQUID (ML) ORAL EVERY MORNING
COMMUNITY
End: 2021-05-03

## 2020-02-04 RX ORDER — FENTANYL CITRATE 50 UG/ML
INJECTION, SOLUTION INTRAMUSCULAR; INTRAVENOUS PRN
Status: DISCONTINUED | OUTPATIENT
Start: 2020-02-04 | End: 2020-02-04

## 2020-02-04 RX ORDER — ACETAMINOPHEN 325 MG/1
975 TABLET ORAL ONCE
Status: COMPLETED | OUTPATIENT
Start: 2020-02-04 | End: 2020-02-04

## 2020-02-04 RX ORDER — EPHEDRINE SULFATE 50 MG/ML
INJECTION, SOLUTION INTRAMUSCULAR; INTRAVENOUS; SUBCUTANEOUS PRN
Status: DISCONTINUED | OUTPATIENT
Start: 2020-02-04 | End: 2020-02-04

## 2020-02-04 RX ORDER — SODIUM CHLORIDE 9 MG/ML
INJECTION, SOLUTION INTRAVENOUS CONTINUOUS
Status: DISCONTINUED | OUTPATIENT
Start: 2020-02-04 | End: 2020-02-07

## 2020-02-04 RX ORDER — NEOSTIGMINE METHYLSULFATE 1 MG/ML
VIAL (ML) INJECTION PRN
Status: DISCONTINUED | OUTPATIENT
Start: 2020-02-04 | End: 2020-02-04

## 2020-02-04 RX ORDER — ONDANSETRON 4 MG/1
4 TABLET, ORALLY DISINTEGRATING ORAL EVERY 6 HOURS PRN
Status: DISCONTINUED | OUTPATIENT
Start: 2020-02-04 | End: 2020-02-12 | Stop reason: HOSPADM

## 2020-02-04 RX ORDER — POLYETHYLENE GLYCOL 3350 17 G/17G
1 POWDER, FOR SOLUTION ORAL DAILY PRN
Status: ON HOLD | COMMUNITY
End: 2020-02-12

## 2020-02-04 RX ORDER — METHOCARBAMOL 750 MG/1
750 TABLET, FILM COATED ORAL EVERY 6 HOURS PRN
Status: DISCONTINUED | OUTPATIENT
Start: 2020-02-04 | End: 2020-02-12 | Stop reason: HOSPADM

## 2020-02-04 RX ORDER — NALOXONE HYDROCHLORIDE 0.4 MG/ML
.1-.4 INJECTION, SOLUTION INTRAMUSCULAR; INTRAVENOUS; SUBCUTANEOUS
Status: DISCONTINUED | OUTPATIENT
Start: 2020-02-04 | End: 2020-02-04

## 2020-02-04 RX ORDER — SODIUM CHLORIDE, SODIUM LACTATE, POTASSIUM CHLORIDE, CALCIUM CHLORIDE 600; 310; 30; 20 MG/100ML; MG/100ML; MG/100ML; MG/100ML
INJECTION, SOLUTION INTRAVENOUS CONTINUOUS
Status: DISCONTINUED | OUTPATIENT
Start: 2020-02-04 | End: 2020-02-04

## 2020-02-04 RX ORDER — CALCIUM CARBONATE 500 MG/1
1000 TABLET, CHEWABLE ORAL 4 TIMES DAILY PRN
Status: DISCONTINUED | OUTPATIENT
Start: 2020-02-04 | End: 2020-02-12 | Stop reason: HOSPADM

## 2020-02-04 RX ORDER — SODIUM CHLORIDE 9 MG/ML
INJECTION, SOLUTION INTRAVENOUS CONTINUOUS PRN
Status: DISCONTINUED | OUTPATIENT
Start: 2020-02-04 | End: 2020-02-04

## 2020-02-04 RX ORDER — FENTANYL CITRATE 50 UG/ML
25-50 INJECTION, SOLUTION INTRAMUSCULAR; INTRAVENOUS
Status: DISCONTINUED | OUTPATIENT
Start: 2020-02-04 | End: 2020-02-04

## 2020-02-04 RX ORDER — AMOXICILLIN 250 MG
2 CAPSULE ORAL 2 TIMES DAILY
Status: DISCONTINUED | OUTPATIENT
Start: 2020-02-04 | End: 2020-02-12 | Stop reason: HOSPADM

## 2020-02-04 RX ORDER — ACETAMINOPHEN 325 MG/1
975 TABLET ORAL EVERY 8 HOURS
Status: COMPLETED | OUTPATIENT
Start: 2020-02-04 | End: 2020-02-07

## 2020-02-04 RX ORDER — METOCLOPRAMIDE 5 MG/1
5 TABLET ORAL EVERY 6 HOURS PRN
Status: DISCONTINUED | OUTPATIENT
Start: 2020-02-04 | End: 2020-02-12 | Stop reason: HOSPADM

## 2020-02-04 RX ORDER — FENTANYL CITRATE 50 UG/ML
25-100 INJECTION, SOLUTION INTRAMUSCULAR; INTRAVENOUS
Status: DISCONTINUED | OUTPATIENT
Start: 2020-02-04 | End: 2020-02-04

## 2020-02-04 RX ORDER — ALBUMIN, HUMAN INJ 5% 5 %
SOLUTION INTRAVENOUS CONTINUOUS PRN
Status: DISCONTINUED | OUTPATIENT
Start: 2020-02-04 | End: 2020-02-04

## 2020-02-04 RX ORDER — AMOXICILLIN 250 MG
1 CAPSULE ORAL 2 TIMES DAILY
Status: DISCONTINUED | OUTPATIENT
Start: 2020-02-04 | End: 2020-02-12 | Stop reason: HOSPADM

## 2020-02-04 RX ORDER — ACETAMINOPHEN 500 MG
1000 TABLET ORAL 3 TIMES DAILY PRN
COMMUNITY

## 2020-02-04 RX ORDER — ONDANSETRON 2 MG/ML
4 INJECTION INTRAMUSCULAR; INTRAVENOUS EVERY 30 MIN PRN
Status: DISCONTINUED | OUTPATIENT
Start: 2020-02-04 | End: 2020-02-04

## 2020-02-04 RX ORDER — GLYCOPYRROLATE 0.2 MG/ML
INJECTION, SOLUTION INTRAMUSCULAR; INTRAVENOUS PRN
Status: DISCONTINUED | OUTPATIENT
Start: 2020-02-04 | End: 2020-02-04

## 2020-02-04 RX ORDER — PROCHLORPERAZINE MALEATE 5 MG
5 TABLET ORAL EVERY 6 HOURS PRN
Status: DISCONTINUED | OUTPATIENT
Start: 2020-02-04 | End: 2020-02-12 | Stop reason: HOSPADM

## 2020-02-04 RX ORDER — ONDANSETRON 2 MG/ML
INJECTION INTRAMUSCULAR; INTRAVENOUS PRN
Status: DISCONTINUED | OUTPATIENT
Start: 2020-02-04 | End: 2020-02-04

## 2020-02-04 RX ORDER — HYDROXYZINE HYDROCHLORIDE 10 MG/1
10 TABLET, FILM COATED ORAL EVERY 6 HOURS PRN
Status: DISCONTINUED | OUTPATIENT
Start: 2020-02-04 | End: 2020-02-12 | Stop reason: HOSPADM

## 2020-02-04 RX ORDER — METOCLOPRAMIDE HYDROCHLORIDE 5 MG/ML
5 INJECTION INTRAMUSCULAR; INTRAVENOUS EVERY 6 HOURS PRN
Status: DISCONTINUED | OUTPATIENT
Start: 2020-02-04 | End: 2020-02-12 | Stop reason: HOSPADM

## 2020-02-04 RX ORDER — ESCITALOPRAM OXALATE 10 MG/1
20 TABLET ORAL DAILY
Status: DISCONTINUED | OUTPATIENT
Start: 2020-02-04 | End: 2020-02-12 | Stop reason: HOSPADM

## 2020-02-04 RX ORDER — ALPRAZOLAM 0.5 MG/1
0.5 TABLET, EXTENDED RELEASE ORAL
Status: DISCONTINUED | OUTPATIENT
Start: 2020-02-04 | End: 2020-02-12 | Stop reason: HOSPADM

## 2020-02-04 RX ORDER — ATORVASTATIN CALCIUM 20 MG/1
20 TABLET, FILM COATED ORAL DAILY
Status: DISCONTINUED | OUTPATIENT
Start: 2020-02-04 | End: 2020-02-12 | Stop reason: HOSPADM

## 2020-02-04 RX ORDER — PROCHLORPERAZINE MALEATE 10 MG
10 TABLET ORAL DAILY PRN
Status: DISCONTINUED | OUTPATIENT
Start: 2020-02-04 | End: 2020-02-04

## 2020-02-04 RX ORDER — NALOXONE HYDROCHLORIDE 0.4 MG/ML
.1-.4 INJECTION, SOLUTION INTRAMUSCULAR; INTRAVENOUS; SUBCUTANEOUS
Status: DISCONTINUED | OUTPATIENT
Start: 2020-02-04 | End: 2020-02-12 | Stop reason: HOSPADM

## 2020-02-04 RX ORDER — CEFAZOLIN SODIUM 1 G/3ML
1 INJECTION, POWDER, FOR SOLUTION INTRAMUSCULAR; INTRAVENOUS EVERY 8 HOURS
Status: COMPLETED | OUTPATIENT
Start: 2020-02-04 | End: 2020-02-05

## 2020-02-04 RX ORDER — CEFAZOLIN SODIUM 1 G/3ML
1 INJECTION, POWDER, FOR SOLUTION INTRAMUSCULAR; INTRAVENOUS SEE ADMIN INSTRUCTIONS
Status: DISCONTINUED | OUTPATIENT
Start: 2020-02-04 | End: 2020-02-04

## 2020-02-04 RX ORDER — POLYETHYLENE GLYCOL 3350 17 G/17G
17 POWDER, FOR SOLUTION ORAL DAILY PRN
Status: DISCONTINUED | OUTPATIENT
Start: 2020-02-04 | End: 2020-02-12 | Stop reason: HOSPADM

## 2020-02-04 RX ORDER — ONDANSETRON 4 MG/1
4 TABLET, ORALLY DISINTEGRATING ORAL EVERY 30 MIN PRN
Status: DISCONTINUED | OUTPATIENT
Start: 2020-02-04 | End: 2020-02-04

## 2020-02-04 RX ORDER — OXYCODONE HYDROCHLORIDE 5 MG/1
5-10 TABLET ORAL EVERY 4 HOURS PRN
Status: DISCONTINUED | OUTPATIENT
Start: 2020-02-04 | End: 2020-02-06

## 2020-02-04 RX ORDER — PROPOFOL 10 MG/ML
INJECTION, EMULSION INTRAVENOUS CONTINUOUS PRN
Status: DISCONTINUED | OUTPATIENT
Start: 2020-02-04 | End: 2020-02-04

## 2020-02-04 RX ORDER — ONDANSETRON 2 MG/ML
4 INJECTION INTRAMUSCULAR; INTRAVENOUS EVERY 6 HOURS PRN
Status: DISCONTINUED | OUTPATIENT
Start: 2020-02-04 | End: 2020-02-12 | Stop reason: HOSPADM

## 2020-02-04 RX ORDER — FAMOTIDINE 10 MG
20 TABLET ORAL 2 TIMES DAILY
Status: DISCONTINUED | OUTPATIENT
Start: 2020-02-04 | End: 2020-02-12 | Stop reason: HOSPADM

## 2020-02-04 RX ORDER — TAMSULOSIN HYDROCHLORIDE 0.4 MG/1
0.4 CAPSULE ORAL AT BEDTIME
Status: DISCONTINUED | OUTPATIENT
Start: 2020-02-04 | End: 2020-02-12 | Stop reason: HOSPADM

## 2020-02-04 RX ORDER — ACETAMINOPHEN 325 MG/1
650 TABLET ORAL EVERY 4 HOURS PRN
Status: DISCONTINUED | OUTPATIENT
Start: 2020-02-07 | End: 2020-02-12 | Stop reason: HOSPADM

## 2020-02-04 RX ORDER — GABAPENTIN 100 MG/1
100 CAPSULE ORAL
Status: COMPLETED | OUTPATIENT
Start: 2020-02-04 | End: 2020-02-04

## 2020-02-04 RX ORDER — HYDROMORPHONE HYDROCHLORIDE 1 MG/ML
.3-.5 INJECTION, SOLUTION INTRAMUSCULAR; INTRAVENOUS; SUBCUTANEOUS EVERY 5 MIN PRN
Status: DISCONTINUED | OUTPATIENT
Start: 2020-02-04 | End: 2020-02-04

## 2020-02-04 RX ORDER — BUPIVACAINE HYDROCHLORIDE AND EPINEPHRINE 5; 5 MG/ML; UG/ML
INJECTION, SOLUTION PERINEURAL PRN
Status: DISCONTINUED | OUTPATIENT
Start: 2020-02-04 | End: 2020-02-04 | Stop reason: HOSPADM

## 2020-02-04 RX ORDER — CEFAZOLIN SODIUM 2 G/100ML
2 INJECTION, SOLUTION INTRAVENOUS
Status: COMPLETED | OUTPATIENT
Start: 2020-02-04 | End: 2020-02-04

## 2020-02-04 RX ORDER — LABETALOL HYDROCHLORIDE 5 MG/ML
10 INJECTION, SOLUTION INTRAVENOUS
Status: DISCONTINUED | OUTPATIENT
Start: 2020-02-04 | End: 2020-02-04

## 2020-02-04 RX ORDER — PROPOFOL 10 MG/ML
INJECTION, EMULSION INTRAVENOUS PRN
Status: DISCONTINUED | OUTPATIENT
Start: 2020-02-04 | End: 2020-02-04

## 2020-02-04 RX ORDER — LIDOCAINE 40 MG/G
CREAM TOPICAL
Status: DISCONTINUED | OUTPATIENT
Start: 2020-02-04 | End: 2020-02-12 | Stop reason: HOSPADM

## 2020-02-04 RX ORDER — DIPHENHYDRAMINE HCL 25 MG
25 TABLET ORAL
COMMUNITY
End: 2021-05-03

## 2020-02-04 RX ADMIN — ROCURONIUM BROMIDE 10 MG: 10 INJECTION INTRAVENOUS at 11:14

## 2020-02-04 RX ADMIN — Medication 10 MG: at 10:53

## 2020-02-04 RX ADMIN — ONDANSETRON 4 MG: 2 INJECTION INTRAMUSCULAR; INTRAVENOUS at 20:51

## 2020-02-04 RX ADMIN — PHENYLEPHRINE HYDROCHLORIDE 100 MCG: 10 INJECTION INTRAVENOUS at 10:33

## 2020-02-04 RX ADMIN — PHENYLEPHRINE HYDROCHLORIDE 1.3 MCG/KG/MIN: 10 INJECTION INTRAVENOUS at 11:39

## 2020-02-04 RX ADMIN — PHENYLEPHRINE HYDROCHLORIDE 100 MCG: 10 INJECTION INTRAVENOUS at 08:20

## 2020-02-04 RX ADMIN — PHENYLEPHRINE HYDROCHLORIDE 100 MCG: 10 INJECTION INTRAVENOUS at 10:04

## 2020-02-04 RX ADMIN — PHENYLEPHRINE HYDROCHLORIDE 100 MCG: 10 INJECTION INTRAVENOUS at 12:50

## 2020-02-04 RX ADMIN — PHENYLEPHRINE HYDROCHLORIDE 100 MCG: 10 INJECTION INTRAVENOUS at 10:30

## 2020-02-04 RX ADMIN — TAMSULOSIN HYDROCHLORIDE 0.4 MG: 0.4 CAPSULE ORAL at 22:33

## 2020-02-04 RX ADMIN — ALBUMIN HUMAN: 0.05 INJECTION, SOLUTION INTRAVENOUS at 10:33

## 2020-02-04 RX ADMIN — PHENYLEPHRINE HYDROCHLORIDE 150 MCG: 10 INJECTION INTRAVENOUS at 12:42

## 2020-02-04 RX ADMIN — SODIUM CHLORIDE: 0.9 INJECTION, SOLUTION INTRAVENOUS at 11:35

## 2020-02-04 RX ADMIN — PROPOFOL 100 MG: 10 INJECTION, EMULSION INTRAVENOUS at 07:33

## 2020-02-04 RX ADMIN — ROCURONIUM BROMIDE 10 MG: 10 INJECTION INTRAVENOUS at 10:40

## 2020-02-04 RX ADMIN — ROCURONIUM BROMIDE 10 MG: 10 INJECTION INTRAVENOUS at 11:26

## 2020-02-04 RX ADMIN — HYDROMORPHONE HYDROCHLORIDE 0.5 MG: 1 INJECTION, SOLUTION INTRAMUSCULAR; INTRAVENOUS; SUBCUTANEOUS at 12:32

## 2020-02-04 RX ADMIN — Medication 5 MG: at 11:13

## 2020-02-04 RX ADMIN — PHENYLEPHRINE HYDROCHLORIDE 100 MCG: 10 INJECTION INTRAVENOUS at 12:18

## 2020-02-04 RX ADMIN — PHENYLEPHRINE HYDROCHLORIDE 100 MCG: 10 INJECTION INTRAVENOUS at 11:24

## 2020-02-04 RX ADMIN — PHENYLEPHRINE HYDROCHLORIDE 100 MCG: 10 INJECTION INTRAVENOUS at 12:47

## 2020-02-04 RX ADMIN — ALBUMIN HUMAN: 0.05 INJECTION, SOLUTION INTRAVENOUS at 11:29

## 2020-02-04 RX ADMIN — PHENYLEPHRINE HYDROCHLORIDE 0.1 MCG/KG/MIN: 10 INJECTION INTRAVENOUS at 09:50

## 2020-02-04 RX ADMIN — ALBUMIN HUMAN: 0.05 INJECTION, SOLUTION INTRAVENOUS at 10:53

## 2020-02-04 RX ADMIN — PHENYLEPHRINE HYDROCHLORIDE 100 MCG: 10 INJECTION INTRAVENOUS at 10:19

## 2020-02-04 RX ADMIN — MIDAZOLAM 1 MG: 1 INJECTION INTRAMUSCULAR; INTRAVENOUS at 07:25

## 2020-02-04 RX ADMIN — FENTANYL CITRATE 50 MCG: 50 INJECTION, SOLUTION INTRAMUSCULAR; INTRAVENOUS at 07:33

## 2020-02-04 RX ADMIN — Medication 5 MG: at 09:50

## 2020-02-04 RX ADMIN — PHENYLEPHRINE HYDROCHLORIDE 100 MCG: 10 INJECTION INTRAVENOUS at 09:38

## 2020-02-04 RX ADMIN — ROCURONIUM BROMIDE 50 MG: 10 INJECTION INTRAVENOUS at 07:33

## 2020-02-04 RX ADMIN — Medication 5 MG: at 08:06

## 2020-02-04 RX ADMIN — PHENYLEPHRINE HYDROCHLORIDE 100 MCG: 10 INJECTION INTRAVENOUS at 09:41

## 2020-02-04 RX ADMIN — Medication 5 MG: at 10:30

## 2020-02-04 RX ADMIN — SODIUM CHLORIDE: 9 INJECTION, SOLUTION INTRAVENOUS at 16:30

## 2020-02-04 RX ADMIN — ALBUMIN HUMAN: 0.05 INJECTION, SOLUTION INTRAVENOUS at 12:50

## 2020-02-04 RX ADMIN — ACETAMINOPHEN 975 MG: 325 TABLET, FILM COATED ORAL at 18:16

## 2020-02-04 RX ADMIN — Medication: at 13:59

## 2020-02-04 RX ADMIN — PHENYLEPHRINE HYDROCHLORIDE 100 MCG: 10 INJECTION INTRAVENOUS at 10:13

## 2020-02-04 RX ADMIN — PHENYLEPHRINE HYDROCHLORIDE 100 MCG: 10 INJECTION INTRAVENOUS at 09:19

## 2020-02-04 RX ADMIN — FAMOTIDINE 20 MG: 10 TABLET, FILM COATED ORAL at 20:52

## 2020-02-04 RX ADMIN — Medication 5 MG: at 08:20

## 2020-02-04 RX ADMIN — Medication 5 MG: at 09:04

## 2020-02-04 RX ADMIN — SODIUM CHLORIDE, POTASSIUM CHLORIDE, SODIUM LACTATE AND CALCIUM CHLORIDE: 600; 310; 30; 20 INJECTION, SOLUTION INTRAVENOUS at 10:12

## 2020-02-04 RX ADMIN — PROPOFOL 30 MCG/KG/MIN: 10 INJECTION, EMULSION INTRAVENOUS at 08:17

## 2020-02-04 RX ADMIN — PHENYLEPHRINE HYDROCHLORIDE 200 MCG: 10 INJECTION INTRAVENOUS at 11:21

## 2020-02-04 RX ADMIN — FENTANYL CITRATE 50 MCG: 50 INJECTION, SOLUTION INTRAMUSCULAR; INTRAVENOUS at 08:09

## 2020-02-04 RX ADMIN — PHENYLEPHRINE HYDROCHLORIDE 100 MCG: 10 INJECTION INTRAVENOUS at 12:48

## 2020-02-04 RX ADMIN — PHENYLEPHRINE HYDROCHLORIDE 100 MCG: 10 INJECTION INTRAVENOUS at 11:13

## 2020-02-04 RX ADMIN — CEFAZOLIN SODIUM 1 G: 2 INJECTION, SOLUTION INTRAVENOUS at 11:54

## 2020-02-04 RX ADMIN — Medication 5 MG: at 09:41

## 2020-02-04 RX ADMIN — SODIUM CHLORIDE, POTASSIUM CHLORIDE, SODIUM LACTATE AND CALCIUM CHLORIDE: 600; 310; 30; 20 INJECTION, SOLUTION INTRAVENOUS at 07:20

## 2020-02-04 RX ADMIN — PHENYLEPHRINE HYDROCHLORIDE 200 MCG: 10 INJECTION INTRAVENOUS at 11:43

## 2020-02-04 RX ADMIN — PHENYLEPHRINE HYDROCHLORIDE 100 MCG: 10 INJECTION INTRAVENOUS at 11:40

## 2020-02-04 RX ADMIN — HYDROMORPHONE HYDROCHLORIDE 0.5 MG: 1 INJECTION, SOLUTION INTRAMUSCULAR; INTRAVENOUS; SUBCUTANEOUS at 09:59

## 2020-02-04 RX ADMIN — GLYCOPYRROLATE 0.4 MG: 0.2 INJECTION, SOLUTION INTRAMUSCULAR; INTRAVENOUS at 12:38

## 2020-02-04 RX ADMIN — PHENYLEPHRINE HYDROCHLORIDE 200 MCG: 10 INJECTION INTRAVENOUS at 10:50

## 2020-02-04 RX ADMIN — PHENYLEPHRINE HYDROCHLORIDE 100 MCG: 10 INJECTION INTRAVENOUS at 08:51

## 2020-02-04 RX ADMIN — Medication 5 MG: at 09:38

## 2020-02-04 RX ADMIN — GABAPENTIN 100 MG: 100 CAPSULE ORAL at 06:21

## 2020-02-04 RX ADMIN — LIDOCAINE HYDROCHLORIDE 1 ML: 10 INJECTION, SOLUTION EPIDURAL; INFILTRATION; INTRACAUDAL; PERINEURAL at 06:44

## 2020-02-04 RX ADMIN — Medication 10 MG: at 11:47

## 2020-02-04 RX ADMIN — CEFAZOLIN 1 G: 1 INJECTION, POWDER, FOR SOLUTION INTRAMUSCULAR; INTRAVENOUS at 20:51

## 2020-02-04 RX ADMIN — Medication 5 MG: at 10:02

## 2020-02-04 RX ADMIN — ACETAMINOPHEN 975 MG: 325 TABLET, FILM COATED ORAL at 06:21

## 2020-02-04 RX ADMIN — PHENYLEPHRINE HYDROCHLORIDE 100 MCG: 10 INJECTION INTRAVENOUS at 09:50

## 2020-02-04 RX ADMIN — CEFAZOLIN SODIUM 2 G: 2 INJECTION, SOLUTION INTRAVENOUS at 07:50

## 2020-02-04 RX ADMIN — HYDROMORPHONE HYDROCHLORIDE 0.5 MG: 1 INJECTION, SOLUTION INTRAMUSCULAR; INTRAVENOUS; SUBCUTANEOUS at 13:17

## 2020-02-04 RX ADMIN — PHENYLEPHRINE HYDROCHLORIDE 100 MCG: 10 INJECTION INTRAVENOUS at 08:54

## 2020-02-04 RX ADMIN — Medication 5 MG: at 10:33

## 2020-02-04 RX ADMIN — CEFAZOLIN SODIUM 1 G: 2 INJECTION, SOLUTION INTRAVENOUS at 09:50

## 2020-02-04 RX ADMIN — Medication 5 MG: at 09:10

## 2020-02-04 RX ADMIN — ROCURONIUM BROMIDE 10 MG: 10 INJECTION INTRAVENOUS at 09:55

## 2020-02-04 RX ADMIN — PHENYLEPHRINE HYDROCHLORIDE 100 MCG: 10 INJECTION INTRAVENOUS at 11:11

## 2020-02-04 RX ADMIN — ROCURONIUM BROMIDE 10 MG: 10 INJECTION INTRAVENOUS at 10:57

## 2020-02-04 RX ADMIN — Medication 5 MG: at 11:21

## 2020-02-04 RX ADMIN — NEOSTIGMINE METHYLSULFATE 3 MG: 1 INJECTION, SOLUTION INTRAVENOUS at 12:38

## 2020-02-04 RX ADMIN — PHENYLEPHRINE HYDROCHLORIDE 100 MCG: 10 INJECTION INTRAVENOUS at 09:28

## 2020-02-04 RX ADMIN — PHENYLEPHRINE HYDROCHLORIDE 100 MCG: 10 INJECTION INTRAVENOUS at 08:43

## 2020-02-04 RX ADMIN — ROCURONIUM BROMIDE 20 MG: 10 INJECTION INTRAVENOUS at 08:07

## 2020-02-04 RX ADMIN — Medication 5 MG: at 11:11

## 2020-02-04 RX ADMIN — PHENYLEPHRINE HYDROCHLORIDE 100 MCG: 10 INJECTION INTRAVENOUS at 08:35

## 2020-02-04 RX ADMIN — ROCURONIUM BROMIDE 10 MG: 10 INJECTION INTRAVENOUS at 08:22

## 2020-02-04 RX ADMIN — SENNOSIDES AND DOCUSATE SODIUM 1 TABLET: 8.6; 5 TABLET ORAL at 20:52

## 2020-02-04 RX ADMIN — ROCURONIUM BROMIDE 10 MG: 10 INJECTION INTRAVENOUS at 09:05

## 2020-02-04 RX ADMIN — Medication 5 MG: at 08:35

## 2020-02-04 RX ADMIN — ONDANSETRON 4 MG: 2 INJECTION INTRAMUSCULAR; INTRAVENOUS at 12:20

## 2020-02-04 RX ADMIN — Medication 10 MG: at 08:16

## 2020-02-04 RX ADMIN — SODIUM CHLORIDE, POTASSIUM CHLORIDE, SODIUM LACTATE AND CALCIUM CHLORIDE: 600; 310; 30; 20 INJECTION, SOLUTION INTRAVENOUS at 08:31

## 2020-02-04 RX ADMIN — Medication 5 MG: at 08:54

## 2020-02-04 RX ADMIN — PHENYLEPHRINE HYDROCHLORIDE 100 MCG: 10 INJECTION INTRAVENOUS at 09:10

## 2020-02-04 RX ADMIN — HYDROMORPHONE HYDROCHLORIDE 0.5 MG: 1 INJECTION, SOLUTION INTRAMUSCULAR; INTRAVENOUS; SUBCUTANEOUS at 13:56

## 2020-02-04 RX ADMIN — ROCURONIUM BROMIDE 10 MG: 10 INJECTION INTRAVENOUS at 08:44

## 2020-02-04 RX ADMIN — PHENYLEPHRINE HYDROCHLORIDE 100 MCG: 10 INJECTION INTRAVENOUS at 12:11

## 2020-02-04 RX ADMIN — PHENYLEPHRINE HYDROCHLORIDE 200 MCG: 10 INJECTION INTRAVENOUS at 11:47

## 2020-02-04 ASSESSMENT — ACTIVITIES OF DAILY LIVING (ADL)
ADLS_ACUITY_SCORE: 14
ADLS_ACUITY_SCORE: 15

## 2020-02-04 ASSESSMENT — LIFESTYLE VARIABLES: TOBACCO_USE: 1

## 2020-02-04 ASSESSMENT — MIFFLIN-ST. JEOR: SCORE: 1187.79

## 2020-02-04 NOTE — BRIEF OP NOTE
[facility] Brief Operative Note    Pre-operative diagnosis: Lumbar radiculopathy [M54.16]   Post-operative diagnosis S/p lumbar fusion   Procedure: Procedure(s):  LUMBAR 2-SACRAL1 TRANSFORMINAL INTERBODY FUSION   Surgeon(s): Surgeon(s) and Role:     * Lenny Gomes MD - Primary     * Cathy Roche PA-C - Assisting   Estimated blood loss: * No values recorded between 2/4/2020  8:09 AM and 2/4/2020 12:51 PM *    Specimens: * No specimens in log *   Findings: See op note

## 2020-02-04 NOTE — ANESTHESIA POSTPROCEDURE EVALUATION
Patient: Michaela Dorman    Procedure(s):  LUMBAR 2-SACRAL1 TRANSFORMINAL INTERBODY FUSION    Diagnosis:Lumbar radiculopathy [M54.16]  Diagnosis Additional Information: No value filed.    Anesthesia Type:  General, ETT    Note:  Anesthesia Post Evaluation    Patient location during evaluation: PACU  Patient participation: Able to fully participate in evaluation  Level of consciousness: awake  Pain management: adequate  Airway patency: patent  Cardiovascular status: acceptable  Respiratory status: acceptable  Hydration status: acceptable  PONV: controlled     Anesthetic complications: None    Comments: Required 2U PRBC.  Stable vitals        Last vitals:  Vitals:    02/04/20 1340 02/04/20 1350 02/04/20 1400   BP: 96/46 95/49 98/46   Pulse: 65 73 67   Resp: 12 12 14   Temp: 36.1  C (97  F)  36.1  C (96.9  F)   SpO2: 100% 99% 100%         Electronically Signed By: Omero Turpin MD  February 4, 2020  2:12 PM

## 2020-02-04 NOTE — OP NOTE
DATE OF PROCEDURE:  2/4/2020     SURGEON:  Lenny Gomes MD     ASSISTANT:  KINA Montoya    Note: Cathy Roche was present for and assisted with the entire surgery and his/her role as an assistant was crucial for aid in positioning, exposure, suctioning, retraction and closure.      PREOPERATIVE DIAGNOSIS:  Lumbar degeneration and stenosis.      POSTOPERATIVE DIAGNOSIS:  Lumbar degeneration and stenosis.      PROCEDURES:   1.  L2-L3 bilateral laminectomy for decompression of stenosis and left complete facetectomy.  2.  L3-L4 bilateral laminectomy for decompression of stenosis and left complete facetectomy.   3.  L4-L5 bilateral laminectomy for decompression of stenosis and left complete facetectomy.   4.  L5-S1 bilateral laminectomy for decompression of stenosis and left complete facetectomy.   5.  L2-L3 transforaminal diskectomy and interbody arthrodesis  6.  L3-L4 transforaminal diskectomy and interbody arthrodesis.   7.  L4-L5 transforaminal diskectomy and interbody arthrodesis.   8.  L5-S1 transforaminal diskectomy and interbody arthrodesis.   9.  Insertion of intervertebral graft with allograft and autograft at L2-L3.   10.  Insertion of intervertebral graft with allograft and autograft at L3-L4.   11.  Insertion of intervertebral graft with allograft and autograft at L4-L5.   12.  Insertion of intervertebral graft with allograft and autograft at L5-S1.   13.  Insertion of bilateral pedicle screws and rods from L2-S1 (Montgomery Lopez).     14.  Bilateral transverse process arthrodesis and posterolateral fusion from L2-S1 with allograft and autograft.   15.  Use of Teknovus O-arm intraoperative CT scan.   16.  Use of Medtronic Stealth intraoperative navigation.   17.  Use of intraoperative fluoroscopy.      ESTIMATED BLOOD LOSS:  1500 mL.      INDICATIONS:  69-year-old female who presented with a profound back and leg pain and weakness.  Imaging demonstrated multi-level severe lumbar degeneration and central and  foraminal stenosis. Underwent extensive nonoperative therapy with failure to improve  Risks, benefits, indications and alternatives were discussed with the patient and family in detail.  All of their questions were answered and they wished to proceed with surgery.      DESCRIPTION OF PROCEDURE:  The patient was positioned prone.  Sterile prepping and draping procedures were performed.  Antibiotics were administered and timeout was performed.  The 10 blade was used to perform a midline incision.  The monopolar was used to perform subperiosteal dissection from L2-S1, exposing the bilateral spinous processes, lamina, facets and transverse processes.  The Medtronic Stealth reference frame was attached to the spinous process of S1.  An O-arm intraoperative CT scan was performed.  Stealth navigation was registered.  Under Stealth navigation, bilateral pedicle screws were inserted at L2, L3, L4, L5 and S1.  Subsequently, the Leksell rongeur was used to remove the spinous processes from L2-S1.  High speed drill was used to perform bilateral laminectomies at L2-L3, L3-L4, L4-L5 and L5-S1, the high speed drill was also used to perform a complete left-sided facetectomies at L2-3, L3-4, L4-5, and L5-S1.  The Kerrison rongeurs were used to remove the ligamentum flavum and decompress the nerve roots from L2-S1.  The #15 blade was then used to perform an annulotomy in the L5-S1 disk space.  A complete diskectomy was performed with a combination of Kerrison rongeurs, pituitary rongeurs and omkar and curets.  Interbody arthrodesis was performed with the omkar and curets.  An intervertebral graft was loaded with allograft and delivered into the disc space.  Autograft was packed in the disc space.  Subsequently, the #15 blade was used to perform an annulotomy in the L4-L5 disk space.  A complete diskectomy was performed with a combination of Kerrison rongeurs, pituitary rongeurs and omkar and curets.  Interbody arthrodesis was  performed with the omkar and curets.  An intervertebral graft was loaded with allograft and delivered into the disc space.  Autograft was packed in the disc space.  Subsequently, the #15 blade was used to perform an annulotomy in the L3-L4 disk space.  A complete diskectomy was performed with a combination of Kerrison rongeurs, pituitary rongeurs and omkar and curets.  Interbody arthrodesis was performed with the omkar and curets.  An intervertebral graft was loaded with allograft and delivered into the disc space.  Autograft was packed in the disc space.  Subsequently, the #15 blade was used to incise the L2-3 disc space.  A complete diskectomy was performed with a combination of Kerrison rongeurs, pituitary rongeurs and omkar and curets.  Interbody arthrodesis was performed with the omkar and curets.  An intervertebral graft was loaded with allograft and delivered into the disc space.  Autograft was packed in the disc space.  Subsequently, the connecting rods and set screws were inserted.  Antibiotic irrigation was performed.  The bilateral transverse processes and remaining facets were arthrodesed with the high speed drill.  Autograft and allograft were packed for posterolateral fusion.  A TITI drain was left in position.  The muscle and fascial layer were closed with 0 Vicryl sutures.  The dermal layer was closed with 2-0 Vicryl sutures and the skin was closed with staples.  There were no intraprocedural complications.

## 2020-02-04 NOTE — PROGRESS NOTES
Medication History Completed by Medication Scribe  Admission medication history interview status for the 2/4/2020  admission is complete. See EPIC admission navigator for prior to admission medications     Medication history sources: Patient, Barney, H&P, Patient's home med list and Care Everywhere  Medication history source reliability: Moderate  Adherence assessment: N/A Not Observed    Significant changes made to the medication list:  None      Additional medication history information:   None    Medication reconciliation completed by provider prior to medication history? No    Time spent in this activity: 25 MINUTES      Prior to Admission medications    Medication Sig Last Dose Taking? Auth Provider   acetaminophen (TYLENOL) 500 MG tablet Take 1,000 mg by mouth 3 times daily as needed for mild pain (500MG X 2 = 1,000MG) 2/3/2020 at AM Yes Reported, Patient   ALPRAZolam (XANAX) 0.5 MG tablet TAKE ONE TABLET BY MOUTH THREE TIMES A DAY AS NEEDED FOR ANXIETY -- (NEED TO BE SEEN IN CLINIC FOR FURTHER REFILLS)  Patient taking differently: Take 0.5 mg by mouth nightly as needed  2/3/2020 at PM Yes Phani Tapia PA-C   atorvastatin (LIPITOR) 20 MG tablet Take 1 tablet (20 mg) by mouth daily 2/3/2020 at AM Yes Phani Tapia PA-C   cholecalciferol (VITAMIN D3) 5000 units (125 mcg) capsule Take 5,000 Units by mouth daily 2/3/2020 at AM Yes Reported, Patient   diphenhydrAMINE (BENADRYL) 25 MG tablet Take 25 mg by mouth nightly as needed for itching or allergies 2/3/2020 at PM Yes Reported, Patient   escitalopram (LEXAPRO) 20 MG tablet TAKE ONE TABLET BY MOUTH ONCE DAILY 2/3/2020 at AM Yes Phani Tapia PA-C   Garlic 1000 MG CAPS Take 1,000 mg by mouth every morning 2/3/2020 at AM Yes Reported, Patient   Lansoprazole (PREVACID PO) Take 40 mg by mouth every morning  2/3/2020 at AM Yes Reported, Patient   MAGNESIUM PO Take 380 mg by mouth daily 2/3/2020 at PM Yes Reported, Patient   Methylcobalamin (METHYL B-12 PO)  Take 5,000 Units by mouth daily 2/3/2020 at AM Yes Reported, Patient   oxyCODONE-acetaminophen (PERCOCET) 5-325 MG tablet Take 1-2 tablets by mouth every 6 hours as needed for moderate to severe pain (#30 to last 30 days)  Patient taking differently: Take 1 tablet by mouth 2 times daily as needed for moderate to severe pain (#30 to last 30 days)  2/3/2020 at AM Yes Phani Tapia PA-C   polyethylene glycol (MIRALAX/GLYCOLAX) powder Take 1 capful by mouth daily as needed for constipation 2/3/2020 at AM Yes Reported, Patient   prochlorperazine (COMPAZINE) 10 MG tablet TAKE ONE TABLET BY MOUTH EVERY 6 HOURS AS NEEDED (NAUSEA/VOMITING)  Patient taking differently: Take 10 mg by mouth daily as needed  MORE THAN 2 WEEKS AGO at PRN Yes Phani Tapia PA-C   tamsulosin (FLOMAX) 0.4 MG capsule Take 2 capsules (0.8 mg) by mouth daily  Patient taking differently: Take 0.4 mg by mouth At Bedtime  2/3/2020 at PM Yes Phani Tapia PA-C   Zinc 30 MG CAPS Take 30 mg by mouth daily 2/3/2020 at AM Yes Reported, Patient

## 2020-02-04 NOTE — ANESTHESIA CARE TRANSFER NOTE
Patient: Michaela Dorman    Procedure(s):  LUMBAR 2-SACRAL1 TRANSFORMINAL INTERBODY FUSION    Diagnosis: Lumbar radiculopathy [M54.16]  Diagnosis Additional Information: No value filed.    Anesthesia Type:   General, ETT     Note:  Airway :Face Mask  Patient transferred to:PACU  Comments: VSS. Airway and IV patent. Patient comfortable. Report to RN. Stable care transfer.Handoff Report: Identifed the Patient, Identified the Reponsible Provider, Reviewed the pertinent medical history, Discussed the surgical course, Reviewed Intra-OP anesthesia mangement and issues during anesthesia, Set expectations for post-procedure period and Allowed opportunity for questions and acknowledgement of understanding      Vitals: (Last set prior to Anesthesia Care Transfer)    CRNA VITALS  2/4/2020 1222 - 2/4/2020 1305      2/4/2020             Resp Rate (observed):  (!) 1    Resp Rate (set):  10                Electronically Signed By: LISA Cherry CRNA  February 4, 2020  1:05 PM

## 2020-02-04 NOTE — OR NURSING
Hemoglobin 6.9. Md Turpin ordered 1 unit of red blood cell. Patient ok to be transfer to the floor while having transfusion .

## 2020-02-04 NOTE — ANESTHESIA PREPROCEDURE EVALUATION
Anesthesia Pre-Procedure Evaluation    Patient: Michaela Dorman   MRN: [id number] : 1950          Preoperative Diagnosis: Lumbar radiculopathy [M54.16]    Procedure(s):  LUMBAR 2-SACRAL1 TRANSFORMINAL INTERBODY FUSION    Past Medical History:   Diagnosis Date     Central stenosis of spinal canal 2017    lumbar      DDD (degenerative disc disease), lumbar 2017     Depressive disorder, not elsewhere classified      Esophageal reflux      ETOH abuse     in remission     Foraminal stenosis of lumbar region 2017    Complete lumbar spine left at various degrees and to a lesser extent the right as well.     Lumbar radiculopathy 2017     Prophylactic antibiotic for dental procedure indicated due to prior joint replacement 2017     S/P reverse total shoulder arthroplasty, right 2017     Subdural hematoma (H)      Past Surgical History:   Procedure Laterality Date     ARTHROPLASTY SHOULDER Right 2015     ARTHROSCOPY KNEE  2012    Procedure:ARTHROSCOPY KNEE; right knee arthroscopy with partial lateral menisectomy; Surgeon:DAMIÁN MCALLISTER; Location:[facility]     C LIGATE FALLOPIAN TUBE       C NONSPECIFIC PROCEDURE      ankle fracture surgery     COLONOSCOPY N/A 2017    Procedure: COLONOSCOPY;  colonoscopy;  Surgeon: Chuck Chand MD;  Location: [facility]     EXCISE MASS AXILLA Left 2016    Procedure: EXCISE MASS AXILLA;  Surgeon: Keyon Blanco MD;  Location: [facility]     HC REMV CATARACT EXTRACAP,INSERT LENS, W/O ECP  2009    Right eye     INJECT EPIDURAL LUMBAR N/A 2019    Procedure: interlaminar epidual steroid injection lumbar 4-5;  Surgeon: Oskar Salvador MD;  Location: [facility]     INJECT EPIDURAL LUMBAR Bilateral 2019    Procedure: lumbar 4-5 epidural interlaminar steroid injection;  Surgeon: Oskar Salvador MD;  Location: [facility]     INSERT PORT VASCULAR ACCESS Right 10/7/2016    Procedure: INSERT PORT VASCULAR ACCESS;  Surgeon: Keyon Blanco  MD;  Location: [facility]     MASTECTOMY SIMPLE BILATERAL Bilateral 2/8/2017    Procedure: MASTECTOMY SIMPLE BILATERAL;  Surgeon: Keyon Blanco MD;  Location: [facility]     OPEN REDUCTION INTERNAL FIXATION FOOT Right 3/20/2015    Procedure: OPEN REDUCTION INTERNAL FIXATION FOOT;  Surgeon: Javi Herrmann DPM;  Location: [facility]     REMOVE PORT VASCULAR ACCESS Right 2/14/2018    Procedure: REMOVE PORT VASCULAR ACCESS;  right intra jugular port removal;  Surgeon: Keyon Blanco MD;  Location: [facility]     SHOULDER SURGERY Right 11/2015       Anesthesia Evaluation     . Pt has had prior anesthetic.     No history of anesthetic complications          ROS/MED HX    ENT/Pulmonary:     (+)tobacco use, Past use , . .   (-) sleep apnea   Neurologic:       Cardiovascular:     (+) Dyslipidemia, hypertension-range: she was over-treated on her medications with low BP.  Taken off 1 of them a few weeks ago.  THis morning BP in the normal range, ---. : . . . :. . Previous cardiac testing Echodate:8/2017results:EF 52% on imaging scandate: results: date: results: date: results:          METS/Exercise Tolerance:     Hematologic:         Musculoskeletal:   (+)  other musculoskeletal- DJD spine with lumbar radiculopathy      GI/Hepatic:     (+) GERD Asymptomatic on medication, Other GI/Hepatic recent elevated LFTs (never previously elevated).  She was told secondary to EtOH and tylenol and she stopped those      Renal/Genitourinary:     (+) chronic renal disease (stage 3), type: CRI,       Endo:         Psychiatric:     (+) psychiatric history (ho EtOH abuse in remission) anxiety      Infectious Disease:         Malignancy:         Other:                          Physical Exam  Normal systems: cardiovascular and pulmonary    Airway   Mallampati: II  TM distance: >3 FB  Neck ROM: full    Dental   (+) chipped    Cardiovascular   Rhythm and rate: regular and normal      Pulmonary    breath sounds clear to auscultation            Lab Results  "  Component Value Date    WBC 7.2 01/13/2020    HGB 12.1 01/13/2020    HCT 38.2 01/13/2020     01/13/2020    SED 9 07/16/2008     01/13/2020    POTASSIUM 4.1 01/13/2020    CHLORIDE 102 01/13/2020    CO2 31 01/13/2020    BUN 21 01/13/2020    CR 0.98 01/13/2020    GLC 89 01/13/2020    VANDANA 9.8 01/13/2020    ALBUMIN 4.2 01/13/2020    PROTTOTAL 7.5 01/13/2020     (H) 01/13/2020    AST 55 (H) 01/13/2020    GGT 43 (H) 10/06/2011    ALKPHOS 71 01/13/2020    BILITOTAL 0.3 01/13/2020    PTT 27 05/14/2008    INR 0.88 02/12/2018    TSH 1.34 01/24/2017       Preop Vitals  BP Readings from Last 3 Encounters:   02/04/20 134/77   01/23/20 110/64   01/13/20 100/62    Pulse Readings from Last 3 Encounters:   02/04/20 70   01/23/20 80   01/13/20 78      Resp Readings from Last 3 Encounters:   02/04/20 16   01/23/20 16   01/13/20 16    SpO2 Readings from Last 3 Encounters:   02/04/20 98%   01/23/20 97%   01/13/20 97%      Temp Readings from Last 1 Encounters:   02/04/20 36.3  C (97.4  F) (Temporal)    Ht Readings from Last 1 Encounters:   02/04/20 1.702 m (5' 7.01\")      Wt Readings from Last 1 Encounters:   02/04/20 63.5 kg (140 lb)    Estimated body mass index is 21.92 kg/m  as calculated from the following:    Height as of this encounter: 1.702 m (5' 7.01\").    Weight as of this encounter: 63.5 kg (140 lb).       Anesthesia Plan      History & Physical Review  History and physical reviewed and following examination; no interval change.    ASA Status:  3 .    NPO Status:  > 8 hours    Plan for General and ETT with Intravenous induction. Maintenance will be Balanced.    PONV prophylaxis:  Ondansetron (or other 5HT-3) and Dexamethasone or Solumedrol  Additional equipment: 2nd IV 2nd PIV after asleep  T&S      Postoperative Care  Postoperative pain management:  IV analgesics.      Consents  Anesthetic plan, risks, benefits and alternatives discussed with:  Patient..                 Omero Turpin MD  "

## 2020-02-05 LAB
ABO + RH BLD: NORMAL
ABO + RH BLD: NORMAL
ANION GAP SERPL CALCULATED.3IONS-SCNC: 2 MMOL/L (ref 3–14)
ANION GAP SERPL CALCULATED.3IONS-SCNC: NORMAL MMOL/L (ref 6–17)
BLD GP AB SCN SERPL QL: NORMAL
BLD PROD TYP BPU: NORMAL
BLD PROD TYP BPU: NORMAL
BLD UNIT ID BPU: 0
BLOOD BANK CMNT PATIENT-IMP: NORMAL
BLOOD PRODUCT CODE: NORMAL
BPU ID: NORMAL
BUN SERPL-MCNC: 14 MG/DL (ref 7–30)
BUN SERPL-MCNC: NORMAL MG/DL (ref 7–30)
CALCIUM SERPL-MCNC: 8.5 MG/DL (ref 8.5–10.1)
CALCIUM SERPL-MCNC: NORMAL MG/DL (ref 8.5–10.1)
CHLORIDE SERPL-SCNC: 111 MMOL/L (ref 94–109)
CHLORIDE SERPL-SCNC: NORMAL MMOL/L (ref 94–109)
CO2 SERPL-SCNC: 27 MMOL/L (ref 20–32)
CO2 SERPL-SCNC: NORMAL MMOL/L (ref 20–32)
CREAT SERPL-MCNC: 0.74 MG/DL (ref 0.52–1.04)
CREAT SERPL-MCNC: NORMAL MG/DL (ref 0.52–1.04)
GFR SERPL CREATININE-BSD FRML MDRD: 82 ML/MIN/{1.73_M2}
GFR SERPL CREATININE-BSD FRML MDRD: NORMAL ML/MIN/{1.73_M2}
GLUCOSE BLDC GLUCOMTR-MCNC: 94 MG/DL (ref 70–99)
GLUCOSE SERPL-MCNC: 94 MG/DL (ref 70–99)
GLUCOSE SERPL-MCNC: NORMAL MG/DL (ref 70–99)
HGB BLD-MCNC: 7 G/DL (ref 11.7–15.7)
HGB BLD-MCNC: NORMAL G/DL (ref 11.7–15.7)
NUM BPU REQUESTED: 3
POTASSIUM SERPL-SCNC: 4 MMOL/L (ref 3.4–5.3)
POTASSIUM SERPL-SCNC: NORMAL MMOL/L (ref 3.4–5.3)
SODIUM SERPL-SCNC: 140 MMOL/L (ref 133–144)
SODIUM SERPL-SCNC: NORMAL MMOL/L (ref 133–144)
SPECIMEN EXP DATE BLD: NORMAL
TRANSFUSION STATUS PATIENT QL: NORMAL
TRANSFUSION STATUS PATIENT QL: NORMAL

## 2020-02-05 PROCEDURE — 80048 BASIC METABOLIC PNL TOTAL CA: CPT | Performed by: HOSPITALIST

## 2020-02-05 PROCEDURE — 00000146 ZZHCL STATISTIC GLUCOSE BY METER IP

## 2020-02-05 PROCEDURE — 12000000 ZZH R&B MED SURG/OB

## 2020-02-05 PROCEDURE — 25800030 ZZH RX IP 258 OP 636: Performed by: PHYSICIAN ASSISTANT

## 2020-02-05 PROCEDURE — 85018 HEMOGLOBIN: CPT | Performed by: HOSPITALIST

## 2020-02-05 PROCEDURE — 36415 COLL VENOUS BLD VENIPUNCTURE: CPT | Performed by: HOSPITALIST

## 2020-02-05 PROCEDURE — 25000132 ZZH RX MED GY IP 250 OP 250 PS 637: Performed by: PHYSICIAN ASSISTANT

## 2020-02-05 PROCEDURE — L0625 LO FLEX L1-BELOW L5 PRE OTS: HCPCS

## 2020-02-05 PROCEDURE — 99232 SBSQ HOSP IP/OBS MODERATE 35: CPT | Performed by: HOSPITALIST

## 2020-02-05 PROCEDURE — 25000128 H RX IP 250 OP 636: Performed by: PHYSICIAN ASSISTANT

## 2020-02-05 PROCEDURE — 25000125 ZZHC RX 250: Performed by: PHYSICIAN ASSISTANT

## 2020-02-05 PROCEDURE — P9016 RBC LEUKOCYTES REDUCED: HCPCS | Performed by: ANESTHESIOLOGY

## 2020-02-05 RX ORDER — CEFAZOLIN SODIUM 1 G/3ML
1 INJECTION, POWDER, FOR SOLUTION INTRAMUSCULAR; INTRAVENOUS EVERY 8 HOURS
Status: DISCONTINUED | OUTPATIENT
Start: 2020-02-05 | End: 2020-02-10

## 2020-02-05 RX ADMIN — OXYCODONE HYDROCHLORIDE 10 MG: 5 TABLET ORAL at 10:19

## 2020-02-05 RX ADMIN — ACETAMINOPHEN 975 MG: 325 TABLET, FILM COATED ORAL at 16:45

## 2020-02-05 RX ADMIN — SODIUM CHLORIDE: 9 INJECTION, SOLUTION INTRAVENOUS at 22:24

## 2020-02-05 RX ADMIN — SENNOSIDES AND DOCUSATE SODIUM 2 TABLET: 8.6; 5 TABLET ORAL at 20:58

## 2020-02-05 RX ADMIN — CEFAZOLIN 1 G: 1 INJECTION, POWDER, FOR SOLUTION INTRAMUSCULAR; INTRAVENOUS at 15:55

## 2020-02-05 RX ADMIN — SODIUM CHLORIDE: 9 INJECTION, SOLUTION INTRAVENOUS at 04:22

## 2020-02-05 RX ADMIN — Medication: at 10:00

## 2020-02-05 RX ADMIN — FAMOTIDINE 20 MG: 10 TABLET, FILM COATED ORAL at 20:57

## 2020-02-05 RX ADMIN — CEFAZOLIN 1 G: 1 INJECTION, POWDER, FOR SOLUTION INTRAMUSCULAR; INTRAVENOUS at 04:15

## 2020-02-05 RX ADMIN — FAMOTIDINE 20 MG: 10 INJECTION, SOLUTION INTRAVENOUS at 09:28

## 2020-02-05 RX ADMIN — OXYCODONE HYDROCHLORIDE 10 MG: 5 TABLET ORAL at 14:18

## 2020-02-05 RX ADMIN — OXYCODONE HYDROCHLORIDE 10 MG: 5 TABLET ORAL at 18:21

## 2020-02-05 RX ADMIN — ACETAMINOPHEN 975 MG: 325 TABLET, FILM COATED ORAL at 00:25

## 2020-02-05 RX ADMIN — TAMSULOSIN HYDROCHLORIDE 0.4 MG: 0.4 CAPSULE ORAL at 20:58

## 2020-02-05 RX ADMIN — PROCHLORPERAZINE EDISYLATE 5 MG: 5 INJECTION INTRAMUSCULAR; INTRAVENOUS at 10:19

## 2020-02-05 RX ADMIN — ONDANSETRON 4 MG: 2 INJECTION INTRAMUSCULAR; INTRAVENOUS at 08:32

## 2020-02-05 RX ADMIN — ACETAMINOPHEN 975 MG: 325 TABLET, FILM COATED ORAL at 08:33

## 2020-02-05 RX ADMIN — OXYCODONE HYDROCHLORIDE 10 MG: 5 TABLET ORAL at 22:23

## 2020-02-05 ASSESSMENT — ACTIVITIES OF DAILY LIVING (ADL)
ADLS_ACUITY_SCORE: 15
ADLS_ACUITY_SCORE: 16
ADLS_ACUITY_SCORE: 15

## 2020-02-05 NOTE — PROVIDER NOTIFICATION
"Yash CASTANON paged, \"[phone] SV: Pt still has both JPs in, but there isn't a continued order for IV abx. Can you please order until drains removed? Thank you! [phone] COLE Schmitt\"    ADDENDUM: Order for abx received.  "

## 2020-02-05 NOTE — PROGRESS NOTES
"[facility]    Medicine Progress Note - Hospitalist Service       Date of Admission:  2/4/2020  Assessment & Plan   Michaela Dorman is a 69 year old female with a past medical history of GERD, HLD, ETOH abuse in remission, depression, and lumbar spinal stenosis with radiculopathy who underwent a previously scheduled L2-S1 fusion by Dr. Gomes on 2/4/2020. Hospitalist service was asked to consult for postoperative comedical management.     Lumbar spinal stenosis s/p L2-S1 fusion on 2/4/2020  Surgery was performed using general anesthesia, EBL notably 1500.   - Postoperative monitoring, management per primary service including pain control, DVT prophylaxis     Acute blood loss anemia  Hgb postop to 6.9, preop value was 12.1. received 1u PRBCs intraop, 1u postop.  - Transfuse PRN per spine recommendations, would advise <7  - Hgb in AM  - Monitor hemodynamics     Depression/Anxiety  - Continue PTA lexapro, xanax     Hyperlipidemia  - Continue PTA statin     GERD  - Continue PTA PPI     Hx ETOH abuse  Pt & family report abstinence since 12/2019.    Diet: Advance Diet as Tolerated: Clear Liquid Diet    DVT Prophylaxis: Pneumatic Compression Devices  Quinones Catheter: in place, indication: Anesthesia    Disposition Plan   Expected discharge: per neurosurgery team.  Entered: Lali Thompson MD 02/05/2020, 10:21 AM       The patient's care was discussed with the Patient.    Lali Thompson MD  Hospitalist Service  [facility]    ______________________________________________________________________    Interval History   Patient appears comfortable. On PCA but feels \"loopy\" so is trying to cut down on how much she uses it. She had some nausea after popsicle this morning. Received zofran and was feeling better. Still on clear diet. Says she hasn't really eaten since she's been in the hospital. Normal BMs typically. Last BM was yesterday.    Data reviewed today: I reviewed all medications, new " labs and imaging results over the last 24 hours. I personally reviewed no images or EKG's today.    Physical Exam   Vital Signs: Temp: 98  F (36.7  C) Temp src: Oral BP: 99/55 Pulse: 69 Heart Rate: 87 Resp: 14 SpO2: 100 % O2 Device: Nasal cannula Oxygen Delivery: 2 LPM  Weight: 140 lbs 0 oz  GEN: well-developed, well-nourished, appears comfortable. On flat bedrest.  HEENT: NCAT, PERRL, EOM intact bilaterally, sclera clear, conjunctiva normal, nose & mouth patent, mucous membranes moist  CHEST: lungs CTA bilaterally, no increased work of breathing, no wheeze, rales, rhonchi  HEART: RRR, S1 & S2, no murmur  ABD: soft, nontender, nondistended, no guarding or rigidity, hypoactive BS in all 4 quadrants  BACK: surgical dressing in place with TITI drain x2 draining serosanguinous fluid  MSK: full AROM bilateral UE/LE, pedal & radial pulses 2+ bilaterally  SKIN: warm & dry without rash, no pedal edema    Data   Recent Labs   Lab 02/05/20  0909 02/05/20  0816 02/04/20  1829  02/04/20  1150 02/04/20  1123 02/04/20  1038   WBC  --   --   --   --  10.7 10.4 10.2   HGB 7.0* Canceled, Test credited, specimen discarded 7.8*   < > 7.5* 8.4* 9.8*   MCV  --   --   --   --  100 99 98   PLT  --   --   --   --  210 212 236    Canceled, Test credited, specimen discarded  --   --   --   --   --    POTASSIUM 4.0 Canceled, Test credited, specimen discarded  --   --   --   --   --    CHLORIDE 111* Canceled, Test credited, specimen discarded  --   --   --   --   --    CO2 27 Canceled, Test credited, specimen discarded  --   --   --   --   --    BUN 14 Canceled, Test credited, specimen discarded  --   --   --   --   --    CR 0.74 Canceled, Test credited, specimen discarded  --   --   --   --   --    ANIONGAP 2* Canceled, Test credited, specimen discarded  --   --   --   --   --    VANDANA 8.5 Canceled, Test credited, specimen discarded  --   --   --   --   --    GLC 94 Canceled, Test credited, specimen discarded  --   --   --   --   --     < >  = values in this interval not displayed.     Recent Results (from the past 24 hour(s))   XR Surgery SETH L/T 5 Min Fluoro w Stills    Narrative    SURGERY C-ARM FLUORO LESS THAN 5 MIN W STILLS 2/4/2020 12:10 PM     COMPARISON: None.    HISTORY: L2-S1 transforaminal interbody fusion / [id number] / 1 O-arm  spin / 5 images / fluoro time 0.3.    NUMBER OF IMAGES ACQUIRED: Six spot films and one axial sequence.    VIEWS: 2    FLUOROSCOPY TIME: 0.7 minutes.      Impression    IMPRESSION: Images demonstrate instrumented anterior and posterior  fusion from L2 to sacrum.    ANJEL CHANEL MD     Medications     HYDROmorphone       sodium chloride 75 mL/hr at 02/05/20 0422       acetaminophen  975 mg Oral Q8H     atorvastatin  20 mg Oral Daily     - MEDICATION INSTRUCTIONS -   Does not apply Once     escitalopram  20 mg Oral Daily     famotidine  20 mg Oral BID    Or     famotidine  20 mg Intravenous BID     senna-docusate  1 tablet Oral BID    Or     senna-docusate  2 tablet Oral BID     sodium chloride (PF)  3 mL Intracatheter Q8H     tamsulosin  0.4 mg Oral At Bedtime

## 2020-02-05 NOTE — PROVIDER NOTIFICATION
"KINA Linares paged, \"[phone] SV: Pt seems to have positional headache. HOB put to 30 degrees for pills and worsened, improved when laid flat again. [phone] COLE Schmitt\"    ADDENDUM: Milagros called back. She will review pt, but wants her HOB flat until told otherwise.  "

## 2020-02-05 NOTE — CONSULTS
[facility]  Consult Note - Hospitalist Service     Date of Admission:  2/4/2020  Consult Requested by: Cathy Roche PA-C  Reason for Consult: comedical management    Assessment & Plan   Michaela Dorman is a 69 year old female with a past medical history of GERD, HLD, ETOH abuse in remission, depression, and lumbar spinal stenosis with radiculopathy who underwent a previously scheduled L2-S1 fusion by Dr. Gomes on 2/4/2020. Hospitalist service was asked to consult for postoperative comedical management.    Lumbar spinal stenosis s/p L2-S1 fusion on 2/4/2020  Surgery was performed using general anesthesia, EBL notably 1500.   - Postoperative monitoring, management per primary service including pain control, DVT prophylaxis    ABL anemia  Hgb postop to 6.9, preop value was 12.1. received 1u PRBCs intraop, 1u postop.  - Transfuse PRN per spine recommendations, would advise <7  - Hgb in AM  - Monitor hemodynamics    Depression/Anxiety  - Continue PTA lexapro, xanax    Hyperlipidemia  - Continue PTA statin    GERD  - Continue PTA PPI    Hx ETOH abuse  Pt & family report abstinence since 12/2019.    The patient's care was discussed with the Attending Physician, Dr. Hernandez, Bedside Nurse, Patient and Patient's Family.    Prem Summers PA-C  [facility]    ______________________________________________________________________    History of Present Illness   Michaela Dorman is a 69 year old female with a past medical history of GERD, HLD, ETOH abuse in remission, depression, and lumbar spinal stenosis with radiculopathy who underwent a previously scheduled L2-S1 fusion by Dr. Gomes on 2/4/2020. Surgery was performed using general anesthesia, pt had EBL 1500 and required 1u PRBCs intraoperatively. Postoperatively, Hgb remained low at 6.9 and patient received an additional unit of PRBCs. Hemodynamics have been stable. Hospitalist service was asked to consult for postoperative comedical  management.    Patient presently is evaluated in hospital room. She denies back pain at this time, states numbness is overall improved from her baseline. She does complain of a mild posterior headache, no vision changes. Denies cp, sob, nausea. Aware of blood loss and receiving blood products, was asking for repeat value. No concerns per patient and family.    Review of Systems   The 10 point Review of Systems is negative other than noted in the HPI or here.     Past Medical History    I have reviewed this patient's medical history and updated it with pertinent information if needed.   Past Medical History:   Diagnosis Date     Central stenosis of spinal canal 12/1/2017    lumbar      DDD (degenerative disc disease), lumbar 12/1/2017     Depressive disorder, not elsewhere classified      Esophageal reflux      ETOH abuse     in remission     Foraminal stenosis of lumbar region 12/1/2017    Complete lumbar spine left at various degrees and to a lesser extent the right as well.     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  Past Surgical History:   Procedure Laterality Date     ARTHROPLASTY SHOULDER Right 11/17/2015     ARTHROSCOPY KNEE  6/7/2012    Procedure:ARTHROSCOPY KNEE; right knee arthroscopy with partial lateral menisectomy; Surgeon:DAMIÁN MCALLISTER; Location: OR     C LIGATE FALLOPIAN TUBE       C NONSPECIFIC PROCEDURE  1956    ankle fracture surgery     COLONOSCOPY N/A 12/22/2017    Procedure: COLONOSCOPY;  colonoscopy;  Surgeon: Chuck Chand MD;  Location:  GI     EXCISE MASS AXILLA Left 9/16/2016    Procedure: EXCISE MASS AXILLA;  Surgeon: Keyon Blanco MD;  Location:  OR      REMV  CATARACT EXTRACAP,INSERT LENS, W/O ECP  2009    Right eye     INJECT EPIDURAL LUMBAR N/A 2019    Procedure: interlaminar epidual steroid injection lumbar 4-5;  Surgeon: Oskar Salvador MD;  Location: [facility] OR     INJECT EPIDURAL LUMBAR Bilateral 2019    Procedure: lumbar 4-5 epidural interlaminar steroid injection;  Surgeon: Oskar Salvador MD;  Location: [facility] OR     INSERT PORT VASCULAR ACCESS Right 10/7/2016    Procedure: INSERT PORT VASCULAR ACCESS;  Surgeon: Keyon Blanco MD;  Location: [facility] OR     MASTECTOMY SIMPLE BILATERAL Bilateral 2017    Procedure: MASTECTOMY SIMPLE BILATERAL;  Surgeon: Keyon Blanco MD;  Location: [facility] OR     OPEN REDUCTION INTERNAL FIXATION FOOT Right 3/20/2015    Procedure: OPEN REDUCTION INTERNAL FIXATION FOOT;  Surgeon: Javi Herrmann DPM;  Location: [facility] OR     REMOVE PORT VASCULAR ACCESS Right 2018    Procedure: REMOVE PORT VASCULAR ACCESS;  right intra jugular port removal;  Surgeon: Keyon Blanco MD;  Location: [facility] OR     SHOULDER SURGERY Right 2015       Social History   I have reviewed this patient's social history and updated it with pertinent information if needed.  Social History     Tobacco Use     Smoking status: Former Smoker     Packs/day: 3.00     Years: 10.00     Pack years: 30.00     Types: Cigarettes     Last attempt to quit: 1979     Years since quittin.2     Smokeless tobacco: Never Used     Tobacco comment: approx. 3 packs daily   Substance Use Topics     Alcohol use: Yes     Alcohol/week: 0.0 standard drinks     Comment: daily glass of wine     Drug use: No       Family History   I have reviewed this patient's family history and updated it with pertinent information if needed.   Family History   Problem Relation Age of Onset     Hypertension Mother      Thyroid Disease Mother      Cerebrovascular Disease Mother      Hypertension Father      Cerebrovascular Disease Father      Cancer Father         skin     Thyroid Disease  Sister      Diabetes Brother         type 2     Depression Sister      Breast Cancer Sister         age 47     Cancer Sister         skin     Cancer Brother         inside nose       Medications   I have reviewed this patient's current medications    Allergies   Allergies   Allergen Reactions     No Known Drug Allergies        Physical Exam   Vital Signs: Temp: 97.7  F (36.5  C) Temp src: Oral BP: 124/66 Pulse: 69 Heart Rate: 94 Resp: 10 SpO2: 100 % O2 Device: Nasal cannula Oxygen Delivery: 2 LPM  Weight: 140 lbs 0 oz    GEN: well-developed, well-nourished, appears comfortable  HEENT: NCAT, PERRL, EOM intact bilaterally, sclera clear, conjunctiva normal, nose & mouth patent, mucous membranes moist  CHEST: lungs CTA bilaterally, no increased work of breathing, no wheeze, rales, rhonchi  HEART: RRR, S1 & S2, no murmur  ABD: soft, nontender, nondistended, no guarding or rigidity, hypoactive BS in all 4 quadrants  BACK: surgical dressing in place with TITI drain x2 draining serosanguinous fluid  MSK: full AROM bilateral UE/LE, pedal & radial pulses 2+ bilaterally  SKIN: warm & dry without rash, no pedal edema    Data   Results for orders placed or performed during the hospital encounter of 02/04/20 (from the past 24 hour(s))   ABO/Rh type and screen   Result Value Ref Range    Units Ordered 2     ABO A     RH(D) Pos     Antibody Screen Neg     Test Valid Only At [facility]        Specimen Expires 02/07/2020     Crossmatch Red Blood Cells    Blood component   Result Value Ref Range    Unit Number [id number]     Blood Component Type Red Blood Cells Leukocyte Reduced     Division Number 00     Status of Unit Released to care unit 02/04/2020 1203     Blood Product Code [id number]     Unit Status ISS    Blood component   Result Value Ref Range    Unit Number [id number]     Blood Component Type Red Blood Cells Leukocyte Reduced     Division Number 00     Status of Unit Released to care unit 02/04/2020 1408      Blood Product Code [id number]     Unit Status ISS    CBC with platelets   Result Value Ref Range    WBC 10.2 4.0 - 11.0 10e9/L    RBC Count 3.01 (L) 3.8 - 5.2 10e12/L    Hemoglobin 9.8 (L) 11.7 - 15.7 g/dL    Hematocrit 29.6 (L) 35.0 - 47.0 %    MCV 98 78 - 100 fl    MCH 32.6 26.5 - 33.0 pg    MCHC 33.1 31.5 - 36.5 g/dL    RDW 13.5 10.0 - 15.0 %    Platelet Count 236 150 - 450 10e9/L   CBC with platelets   Result Value Ref Range    WBC 10.4 4.0 - 11.0 10e9/L    RBC Count 2.59 (L) 3.8 - 5.2 10e12/L    Hemoglobin 8.4 (L) 11.7 - 15.7 g/dL    Hematocrit 25.7 (L) 35.0 - 47.0 %    MCV 99 78 - 100 fl    MCH 32.4 26.5 - 33.0 pg    MCHC 32.7 31.5 - 36.5 g/dL    RDW 13.5 10.0 - 15.0 %    Platelet Count 212 150 - 450 10e9/L   CBC with platelets   Result Value Ref Range    WBC 10.7 4.0 - 11.0 10e9/L    RBC Count 2.29 (L) 3.8 - 5.2 10e12/L    Hemoglobin 7.5 (L) 11.7 - 15.7 g/dL    Hematocrit 22.8 (L) 35.0 - 47.0 %     78 - 100 fl    MCH 32.8 26.5 - 33.0 pg    MCHC 32.9 31.5 - 36.5 g/dL    RDW 13.5 10.0 - 15.0 %    Platelet Count 210 150 - 450 10e9/L   XR Surgery SETH L/T 5 Min Fluoro w Stills    Narrative    SURGERY C-ARM FLUORO LESS THAN 5 MIN W STILLS 2/4/2020 12:10 PM     COMPARISON: None.    HISTORY: L2-S1 transforaminal interbody fusion / [id number] / 1 O-arm  spin / 5 images / fluoro time 0.3.    NUMBER OF IMAGES ACQUIRED: Six spot films and one axial sequence.    VIEWS: 2    FLUOROSCOPY TIME: 0.7 minutes.      Impression    IMPRESSION: Images demonstrate instrumented anterior and posterior  fusion from L2 to sacrum.    ANJEL CHANLE MD   Hemoglobin   Result Value Ref Range    Hemoglobin 6.9 (LL) 11.7 - 15.7 g/dL   Hemoglobin   Result Value Ref Range    Hemoglobin 7.8 (L) 11.7 - 15.7 g/dL     *Note: Due to a large number of results and/or encounters for the requested time period, some results have not been displayed. A complete set of results can be found in Results Review.

## 2020-02-05 NOTE — PLAN OF CARE
POD #1 from a L2-S1 PSF. A&Ox4. CMS numbness improving in BUE, numbness/tingling BLE improving per patient. Bowel sounds hypoactive, not passing flatus, tolerating regular diet. VSS. Dressing CDI. JPx2 . Quinones patent. Turn in bed with A1, flat bedrest overnight. C/o 5/10 pain, decreased with PCA dilaudid. Pt scoring green on the Aggression Stop Light Tool. Continue to monitor.

## 2020-02-05 NOTE — PROGRESS NOTES
POD 0 from a L2-S1 PSF w/ Dr. Gomes. EBL 2000ml, received 2 units of blood. Hgb recheck was 7.8 w/ AM draw scheduled as well. A&Ox4. CMS intact, except numbness/tingling in BLE and RUE. Bowel sounds hypo, - flatus, tolerating clear liquid diet. VSS. Dressing CDI. JPX2, CDI, patent. BR this shift. C/o incisional pain, decreased with PCA and tylenol. Pt also complained of headache that worsened when she sat up to take tylenol, improved when laid back down so neurosurgery notified. Pt scoring green on the Aggression Stop Light Tool. Plan to continue pain management and flat bedrest overnight, nursing will continue to monitor.

## 2020-02-05 NOTE — PROGRESS NOTES
"[facility]    Neurosurgery Progress Note    Date of Service (when I saw the patient): 02/05/2020     Assessment & Plan   Michaela Dorman is a 69 year old female who was admitted on 2/4/2020 who presented with a profound back and leg pain and weakness.  Imaging demonstrated multi-level severe lumbar degeneration and central and foraminal stenosis. Subsequently underwent L2-S1 TLIF with Dr. Gomes. She did develop positional headaches overnight and has been on flat bedrest since. Currently lying flat in bed and denies headache. States she feels as though she is \"out of it\" from too much pain medication; currently on PCA. Hgb 7.0 on recheck this AM and does have baseline low BP; will transfuse 2 units; recheck hgb in AM. Plan to increase HOB gradually.     Active Problems:    S/P lumbar fusion    Assessment: s/p L2-S1 TLIF    Plan:   Increase HOB 10-15 degrees every hour if tolerated  Transfuse 2 units; recheck hgb in AM and change drains to gravity  Therapies when tolerated; Brace when out of bed   Discontinue PCA and transition to orals today; limit oversedating medications   Suture/Staple removal in 10-14 days      I have discussed the following assessment and plan with Dr. Gomes who is in agreement with initial plan and will follow up with further consultation recommendations.      Cathy Roche PA-C  [facility] Neurosurgery  [address] [address]    Tel [phone]  Pager [phone]      Interval History   Positional headache overnight; currently on flat bedrest    Physical Exam   Temp: 98  F (36.7  C) Temp src: Oral BP: 99/55 Pulse: 69 Heart Rate: 87 Resp: 14 SpO2: 100 % O2 Device: Nasal cannula Oxygen Delivery: 2 LPM  Vitals:    02/04/20 0600   Weight: 140 lb (63.5 kg)     Vital Signs with Ranges  Temp:  [96.9  F (36.1  C)-98.1  F (36.7  C)] 98  F (36.7  C)  Pulse:  [61-77] 69  Heart Rate:  [64-97] 87  Resp:  [10-14] 14  BP: " "()/(46-94) 99/55  SpO2:  [89 %-100 %] 100 %  I/O last 3 completed shifts:  In: 5900 [P.O.:850; I.V.:3500]  Out: 2715 [Urine:1650; Drains:1065]    Heart Rate: 87, Blood pressure 99/55, pulse 69, temperature 98  F (36.7  C), temperature source Oral, resp. rate 14, height 5' 7.01\" (1.702 m), weight 140 lb (63.5 kg), SpO2 100 %, not currently breastfeeding.  140 lbs 0 oz  HEENT:  Normocephalic, atraumatic.  PERRLA.  EOM s intact.    Neck:  Supple, non-tender, without lymphadenopathy.  Heart:  No peripheral edema  Lungs:  No SOB  Skin:  Warm and dry. Incision covered with dressing. JPx2.  Extremities:  No edema, cyanosis or clubbing.    NEUROLOGICAL EXAMINATION:   Mental status:  Alert and Oriented x 3, speech is fluent.  Cranial nerves:  II-XII intact.   Motor:     Hip Flexor:                Right: 5/5  Left:  5/5  Hip Adductor:             Right:  5/5  Left:  5/5  Hip Abductor:             Right:  5/5  Left:  5/5  Gastroc Soleus:        Right:  5/5  Left:  5/5  Tib/Ant:                      Right:  5/5  Left:  5/5  EHL:                     Right:  5/5  Left:  5/5  Sensation:  intact     Medications     HYDROmorphone       sodium chloride 75 mL/hr at 02/05/20 0422       acetaminophen  975 mg Oral Q8H     atorvastatin  20 mg Oral Daily     - MEDICATION INSTRUCTIONS -   Does not apply Once     escitalopram  20 mg Oral Daily     famotidine  20 mg Oral BID    Or     famotidine  20 mg Intravenous BID     senna-docusate  1 tablet Oral BID    Or     senna-docusate  2 tablet Oral BID     sodium chloride (PF)  3 mL Intracatheter Q8H     tamsulosin  0.4 mg Oral At Bedtime       Data   CBC RESULTS:   Recent Labs   Lab Test 02/05/20  0909  02/04/20  1150   WBC  --   --  10.7   RBC  --   --  2.29*   HGB 7.0*   < > 7.5*   HCT  --   --  22.8*   MCV  --   --  100   MCH  --   --  32.8   MCHC  --   --  32.9   RDW  --   --  13.5   PLT  --   --  210    < > = values in this interval not displayed.     Basic Metabolic Panel:  Lab " Results   Component Value Date     02/05/2020      Lab Results   Component Value Date    POTASSIUM 4.0 02/05/2020     Lab Results   Component Value Date    CHLORIDE 111 02/05/2020     Lab Results   Component Value Date    VANDANA 8.5 02/05/2020     Lab Results   Component Value Date    CO2 27 02/05/2020     Lab Results   Component Value Date    BUN 14 02/05/2020     Lab Results   Component Value Date    CR 0.74 02/05/2020     Lab Results   Component Value Date    GLC 94 02/05/2020     INR:  Lab Results   Component Value Date    INR 0.88 02/12/2018    INR 0.91 05/14/2008

## 2020-02-05 NOTE — PROGRESS NOTES
SPIRITUAL HEALTH SERVICES Progress Note  [facility] 714-01    Visited pt per  referral. Pt's , daughter, and granddaughter were also in the room.    Pt described that her oxygen is low today. Pt's  just came back from a vacation and family plan to contact with their . Pt also has a family friend who is a retired . Pt's family has nine horses and pt's  took a video this morning of all the horse and showed it to pt. Pt is well supported by families and friends, [facility] will remain available for any future needs.       Benji Ascencio  Chaplain Resident

## 2020-02-05 NOTE — PROGRESS NOTES
"S: Pt was seen today at [facility] for eval/fitting for an LSO corset as ordered.   OG:  Reduce pain and motion of the lumbar spine  A: At this time I have fit pt with a size 32-38\" panel LSO corset with Velcro closures. The pt was instructed on donning/doffing; care and cleaning of the LSO was explained. Care was taken in selection of the proper size LSO to insure an intimate fit.  Upon completion of the initial fitting the pt had no complaints, and the fit of the orthosis appeared to be satisfactory at this time.   P: The pt was instructed to call if any problems or questions arise.   Yash GOMEZ LO        "

## 2020-02-05 NOTE — PROVIDER NOTIFICATION
Page to Yash CASTANON, pt has received all ordered units of PRBCs. If you want 2 units today, need additional order, awaiting response    Eliseo alexis, he wishes for Hgb recheck in am and will address further need for blood admin at that point.     Hgb already ordered for POD 2 2/6/2020 at 0600.

## 2020-02-05 NOTE — PLAN OF CARE
PT Note 2/5/2020 10:09 AM    PT carmenal received, chart reviewed. Per discussion with RN, pt is on flat bed rest for positional headache. Pt is s/p L2-S1 trans foraminal interbody fusion, POD 1. PT will re-schedule evaluation for 02/06/2020.

## 2020-02-06 ENCOUNTER — APPOINTMENT (OUTPATIENT)
Dept: PHYSICAL THERAPY | Facility: CLINIC | Age: 70
DRG: 454 | End: 2020-02-06
Attending: PHYSICIAN ASSISTANT
Payer: MEDICARE

## 2020-02-06 LAB
GLUCOSE BLDC GLUCOMTR-MCNC: 87 MG/DL (ref 70–99)
HGB BLD-MCNC: 8.4 G/DL (ref 11.7–15.7)

## 2020-02-06 PROCEDURE — 97530 THERAPEUTIC ACTIVITIES: CPT | Mod: GP

## 2020-02-06 PROCEDURE — 85018 HEMOGLOBIN: CPT | Performed by: PHYSICIAN ASSISTANT

## 2020-02-06 PROCEDURE — 97161 PT EVAL LOW COMPLEX 20 MIN: CPT | Mod: GP

## 2020-02-06 PROCEDURE — 25800030 ZZH RX IP 258 OP 636: Performed by: PHYSICIAN ASSISTANT

## 2020-02-06 PROCEDURE — 25000128 H RX IP 250 OP 636: Performed by: PHYSICIAN ASSISTANT

## 2020-02-06 PROCEDURE — 99232 SBSQ HOSP IP/OBS MODERATE 35: CPT | Performed by: HOSPITALIST

## 2020-02-06 PROCEDURE — 25000125 ZZHC RX 250: Performed by: PHYSICIAN ASSISTANT

## 2020-02-06 PROCEDURE — 25000132 ZZH RX MED GY IP 250 OP 250 PS 637: Performed by: PHYSICIAN ASSISTANT

## 2020-02-06 PROCEDURE — 00000146 ZZHCL STATISTIC GLUCOSE BY METER IP

## 2020-02-06 PROCEDURE — 12000000 ZZH R&B MED SURG/OB

## 2020-02-06 PROCEDURE — 36415 COLL VENOUS BLD VENIPUNCTURE: CPT | Performed by: PHYSICIAN ASSISTANT

## 2020-02-06 RX ORDER — OXYCODONE HYDROCHLORIDE 5 MG/1
5-10 TABLET ORAL
Status: DISCONTINUED | OUTPATIENT
Start: 2020-02-06 | End: 2020-02-12 | Stop reason: HOSPADM

## 2020-02-06 RX ORDER — HYDROMORPHONE HYDROCHLORIDE 1 MG/ML
.2-.4 INJECTION, SOLUTION INTRAMUSCULAR; INTRAVENOUS; SUBCUTANEOUS
Status: DISCONTINUED | OUTPATIENT
Start: 2020-02-06 | End: 2020-02-12 | Stop reason: HOSPADM

## 2020-02-06 RX ADMIN — OXYCODONE HYDROCHLORIDE 10 MG: 5 TABLET ORAL at 16:50

## 2020-02-06 RX ADMIN — SODIUM CHLORIDE: 9 INJECTION, SOLUTION INTRAVENOUS at 12:35

## 2020-02-06 RX ADMIN — METHOCARBAMOL TABLETS 750 MG: 750 TABLET, COATED ORAL at 15:27

## 2020-02-06 RX ADMIN — CEFAZOLIN 1 G: 1 INJECTION, POWDER, FOR SOLUTION INTRAMUSCULAR; INTRAVENOUS at 00:04

## 2020-02-06 RX ADMIN — OXYCODONE HYDROCHLORIDE 10 MG: 5 TABLET ORAL at 06:24

## 2020-02-06 RX ADMIN — PROCHLORPERAZINE EDISYLATE 5 MG: 5 INJECTION INTRAMUSCULAR; INTRAVENOUS at 09:37

## 2020-02-06 RX ADMIN — OXYCODONE HYDROCHLORIDE 10 MG: 5 TABLET ORAL at 13:44

## 2020-02-06 RX ADMIN — OXYCODONE HYDROCHLORIDE 10 MG: 5 TABLET ORAL at 10:29

## 2020-02-06 RX ADMIN — CEFAZOLIN 1 G: 1 INJECTION, POWDER, FOR SOLUTION INTRAMUSCULAR; INTRAVENOUS at 07:48

## 2020-02-06 RX ADMIN — CEFAZOLIN 1 G: 1 INJECTION, POWDER, FOR SOLUTION INTRAMUSCULAR; INTRAVENOUS at 23:16

## 2020-02-06 RX ADMIN — FAMOTIDINE 20 MG: 10 INJECTION, SOLUTION INTRAVENOUS at 07:48

## 2020-02-06 RX ADMIN — ACETAMINOPHEN 975 MG: 325 TABLET, FILM COATED ORAL at 08:48

## 2020-02-06 RX ADMIN — OXYCODONE HYDROCHLORIDE 10 MG: 5 TABLET ORAL at 02:35

## 2020-02-06 RX ADMIN — ESCITALOPRAM OXALATE 20 MG: 10 TABLET ORAL at 08:47

## 2020-02-06 RX ADMIN — TAMSULOSIN HYDROCHLORIDE 0.4 MG: 0.4 CAPSULE ORAL at 21:32

## 2020-02-06 RX ADMIN — OXYCODONE HYDROCHLORIDE 10 MG: 5 TABLET ORAL at 23:16

## 2020-02-06 RX ADMIN — ACETAMINOPHEN 975 MG: 325 TABLET, FILM COATED ORAL at 16:50

## 2020-02-06 RX ADMIN — SENNOSIDES AND DOCUSATE SODIUM 2 TABLET: 8.6; 5 TABLET ORAL at 08:47

## 2020-02-06 RX ADMIN — METHOCARBAMOL TABLETS 750 MG: 750 TABLET, COATED ORAL at 09:37

## 2020-02-06 RX ADMIN — CEFAZOLIN 1 G: 1 INJECTION, POWDER, FOR SOLUTION INTRAMUSCULAR; INTRAVENOUS at 15:27

## 2020-02-06 RX ADMIN — ACETAMINOPHEN 975 MG: 325 TABLET, FILM COATED ORAL at 00:04

## 2020-02-06 RX ADMIN — FAMOTIDINE 20 MG: 10 TABLET, FILM COATED ORAL at 21:32

## 2020-02-06 RX ADMIN — ATORVASTATIN CALCIUM 20 MG: 20 TABLET, FILM COATED ORAL at 08:48

## 2020-02-06 RX ADMIN — METHOCARBAMOL TABLETS 750 MG: 750 TABLET, COATED ORAL at 21:32

## 2020-02-06 RX ADMIN — ONDANSETRON 4 MG: 2 INJECTION INTRAMUSCULAR; INTRAVENOUS at 07:48

## 2020-02-06 RX ADMIN — OXYCODONE HYDROCHLORIDE 10 MG: 5 TABLET ORAL at 19:59

## 2020-02-06 RX ADMIN — SENNOSIDES AND DOCUSATE SODIUM 2 TABLET: 8.6; 5 TABLET ORAL at 21:32

## 2020-02-06 ASSESSMENT — ACTIVITIES OF DAILY LIVING (ADL)
ADLS_ACUITY_SCORE: 15
ADLS_ACUITY_SCORE: 12
ADLS_ACUITY_SCORE: 15
ADLS_ACUITY_SCORE: 15
ADLS_ACUITY_SCORE: 12
ADLS_ACUITY_SCORE: 14

## 2020-02-06 NOTE — PROGRESS NOTES
02/06/20 0915   Quick Adds   Type of Visit Initial PT Evaluation   Living Environment   Lives With spouse   Living Arrangements house   Home Accessibility stairs to enter home   Number of Stairs, Main Entrance 1   Stair Railings, Main Entrance none   Transportation Anticipated family or friend will provide   Living Environment Comment Pt lives with spouse in a 1SH with 1STE without rail support   Self-Care   Usual Activity Tolerance good   Current Activity Tolerance moderate   Regular Exercise No   Equipment Currently Used at Home none   Activity/Exercise/Self-Care Comment Pt was ind with all ADL's and iADL's   Functional Level Prior   Ambulation 0-->independent   Transferring 0-->independent   Toileting 0-->independent   Bathing 0-->independent   Communication 0-->understands/communicates without difficulty   Swallowing 0-->swallows foods/liquids without difficulty   Cognition 0 - no cognition issues reported   Fall history within last six months yes   Number of times patient has fallen within last six months 1   Which of the above functional risks had a recent onset or change? none   Prior Functional Level Comment Pt was ind with ambulation.   General Information   Onset of Illness/Injury or Date of Surgery - Date 02/04/20   Referring Physician Cathy Roche PA-C   Patient/Family Goals Statement Be able to walk   Pertinent History of Current Problem (include personal factors and/or comorbidities that impact the POC) Pt is a 69 yr old female s/p L2-S1 trans foraminal interbody fusion, POD 2   Precautions/Limitations fall precautions;spinal precautions   Weight-Bearing Status - LLE full weight-bearing   Weight-Bearing Status - RLE full weight-bearing   General Observations Pleasant and cooperative   General Info Comments Activity: Up ad [place] POD1, Ambulate, Brace when OOB   Cognitive Status Examination   Orientation orientation to person, place and time   Level of Consciousness alert   Follows Commands and  Answers Questions 100% of the time   Personal Safety and Judgment intact   Memory intact   Pain Assessment   Patient Currently in Pain Yes, see Vital Sign flowsheet  (6/10 back pain, HA at 9/10 with sitting)   Posture    Posture Comments S   Range of Motion (ROM)   ROM Comment BLE: WFL   Strength   Strength Comments BLE; grossly 4/5   Bed Mobility   Bed Mobility Comments Supine<>sit: min assist x 1    Transfer Skills   Transfer Comments NT   Gait   Gait Comments Unable to assess   Balance   Balance Comments Sitting:Good   Sensory Examination   Sensory Perception no deficits were identified   Coordination   Coordination no deficits were identified   Muscle Tone   Muscle Tone no deficits were identified   Modality Interventions   Planned Modality Interventions Cryotherapy   General Therapy Interventions   Planned Therapy Interventions balance training;bed mobility training;gait training;strengthening;transfer training;risk factor education;home program guidelines;progressive activity/exercise   Clinical Impression   Criteria for Skilled Therapeutic Intervention yes, treatment indicated   PT Diagnosis Impaired gait   Influenced by the following impairments decreased strength and activity tolerance, Pain   Functional limitations due to impairments assistance needed with mobility   Clinical Presentation Evolving/Changing   Clinical Presentation Rationale clinical judgement, [facility]   Clinical Decision Making (Complexity) Low complexity   Therapy Frequency Daily   Predicted Duration of Therapy Intervention (days/wks) 7   Anticipated Equipment Needs at Discharge front wheeled walker   Anticipated Discharge Disposition Home with Assist   Risk & Benefits of therapy have been explained Yes   Patient, Family & other staff in agreement with plan of care Yes   Clinical Impression Comments Pt presents with impaired strength, activity tolerance and pain limiting independence with fucntional mobility. Pt will benefit from continued  "skilled PT to achieve PLOF and independence.    [facility] AM-PAC  \"6 Clicks\" V.2 Basic Mobility Inpatient Short Form   1. Turning from your back to your side while in a flat bed without using bedrails? 3 - A Little   2. Moving from lying on your back to sitting on the side of a flat bed without using bedrails? 3 - A Little   3. Moving to and from a bed to a chair (including a wheelchair)? 3 - A Little   4. Standing up from a chair using your arms (e.g., wheelchair, or bedside chair)? 3 - A Little   5. To walk in hospital room? 3 - A Little   6. Climbing 3-5 steps with a railing? 2 - A Lot   Basic Mobility Raw Score (Score out of 24.Lower scores equate to lower levels of function) 17   Total Evaluation Time   Total Evaluation Time (Minutes) 10     "

## 2020-02-06 NOTE — PROGRESS NOTES
[facility]    Neurosurgery  Daily Post-Op Note    Assessment & Plan   Procedure(s):  LUMBAR 2-SACRAL1 TRANSFORMINAL INTERBODY FUSION   -2 Days Post-Op  Had some n/v and severe positional headache when she sat up this am.   Pain better controlled now with change in regimen.     Plan:  -Advance activity as tolerated  -will again gradually advance HOB today.   JPs with 15 and 35 mL overnight, left and right respectively.   -will keep TITI and watkins for now.     Venkat Shipley    Interval History   Stable.     Physical Exam   Temp: 98.3  F (36.8  C) Temp src: Oral BP: 110/59   Heart Rate: 80 Resp: 16 SpO2: 97 % O2 Device: Nasal cannula Oxygen Delivery: 1 LPM  Vitals:    02/04/20 0600   Weight: 63.5 kg (140 lb)     Vital Signs with Ranges  Temp:  [98.3  F (36.8  C)-99.9  F (37.7  C)] 98.3  F (36.8  C)  Heart Rate:  [70-92] 80  Resp:  [12-16] 16  BP: (104-132)/(48-67) 110/59  SpO2:  [95 %-99 %] 97 %  I/O last 3 completed shifts:  In: 1196.75 [P.O.:200; I.V.:653]  Out: 5070 [Urine:2750; Drains:320; Blood:2000]    Alert and oriented.  Moves all extremities equally.    Incision: CDI      Medications     sodium chloride 75 mL/hr at 02/05/20 2224        acetaminophen  975 mg Oral Q8H     atorvastatin  20 mg Oral Daily     ceFAZolin  1 g Intravenous Q8H     escitalopram  20 mg Oral Daily     famotidine  20 mg Oral BID    Or     famotidine  20 mg Intravenous BID     senna-docusate  1 tablet Oral BID    Or     senna-docusate  2 tablet Oral BID     sodium chloride (PF)  3 mL Intracatheter Q8H     tamsulosin  0.4 mg Oral At Bedtime       Data         Venkat Shipley PA-C  [facility] Neurosurgery  [address]  [address] [place] [id number]    Tel [phone]  Pager [phone]

## 2020-02-06 NOTE — PROVIDER NOTIFICATION
"KINA Stuart paged, \"[phone] SV: Family at bedside, concerned about pain management and positional headache. Are you rounding soon? [phone] COLE Schmitt\"    ADDENDUM: Jesus rounded on pt and answered family questions.  "

## 2020-02-06 NOTE — PLAN OF CARE
POD2 L 2-S1 PSF. A&Ox4. CMS unchanged: baseline numbness in fingers and toes otherwise intact. Generalized weakness. VSS, 2 LPM. Dressing CDI. TITI open to gravity: 10 and 5 output. advanced diet to full liquid diet. Bedrest. Quinones patent. BS active and passing flatus. C/o back pain, decreased with tylenol and oxycodone. Pt scoring green on the aggression stoplight tool. Discharge pending. Plan for hemoglobin recheck in AM.

## 2020-02-06 NOTE — PROVIDER NOTIFICATION
"KINA Stuart paged, \"[phone] SV: Can we get oxycodone changed to q3hr for better pain control and IV dilaudid 0.3-0.5mg added for breakthrough? [phone] COLE Schmitt\"    ADDENDUM: Orders received for changes in pain management.  "

## 2020-02-06 NOTE — PLAN OF CARE
Discharge Planner PT   Patient plan for discharge: Hopeful to return Home with spouse  Current status: PT eval completed, treatment initiated. Pt is a 69 yr old female s/p L2-S1 trans foraminal interbody fusion, POD 2. Pt lives with spouse in a 1SH with 1 SURESH without rail support. Pt was ind with all ADL's and ambulation at baseline.  Pt is edu on spinal precautions, log rolling technique and LSO when OOB. Pt was received in bed in side lying with pillow between knees. Reported 6/10 pain in the back.  Pt agreed to PT interventions. Pt required min assist x 1 with max cues for side lying>sitting at EOB. Pt required increased time to complete task due to muscle spasm in the R LE. Pt sat at EOB x 3 min and reported sudden increase in headache rating at 9/10 and nausea. Pt was assisted back to bed with min assist x 1 with HOB lowered. Once pt returned to bed, headache decreased to 2/10. Discussed PT POC and recommendations with pt. RN informed of positional headache.   Barriers to return to prior living situation: Unable to initiate mobility this date, Pain  Recommendations for discharge: Home with assist for all mobility   Rationale for recommendations: With improvement in positional headache, pt is anticipated to make progress in all functional mobility and may be able to return home with assist of spouse. PT will continue to monitor progress and update recommendations.        Entered by: Sharon Bonds 02/06/2020 9:39 AM

## 2020-02-06 NOTE — PROGRESS NOTES
[facility]    Medicine Progress Note - Hospitalist Service       Date of Admission:  2/4/2020  Assessment & Plan   Michaela Dorman is a 69 year old female with a past medical history of GERD, HLD, ETOH abuse in remission, depression, and lumbar spinal stenosis with radiculopathy who underwent a previously scheduled L2-S1 fusion by Dr. Gomes on 2/4/2020. Hospitalist service was asked to consult for postoperative comedical management.     Lumbar spinal stenosis s/p L2-S1 fusion on 2/4/2020  Surgery was performed using general anesthesia, EBL notably 1500.   - Postoperative monitoring, management per primary service including pain control, DVT prophylaxis     Acute blood loss anemia  Hgb postop to 6.9, preop value was 12.1. received 1u PRBCs intraop, 1u postop and another 2u yesterday.  - Transfuse PRN per spine recommendations, would advise <7  - Hgb in AM  - Monitor hemodynamics     Depression/Anxiety  - Continue PTA lexapro, xanax     Hyperlipidemia  - Continue PTA statin     GERD  - Continue PTA PPI     Hx ETOH abuse  Pt & family report abstinence since 12/2019.    Diet: Advance Diet as Tolerated: Full Liquid Diet    DVT Prophylaxis: Pneumatic Compression Devices  Quinones Catheter: in place, indication: Anesthesia    Disposition Plan   Expected discharge: per neurosurgery team.  Entered: Lali Thompson MD 02/06/2020, 10:31 AM       The patient's care was discussed with the Patient.    Lali Thompson MD  Hospitalist Service  [facility]    ______________________________________________________________________    Interval History   Patient says pain and HA this morning were excruciating but she is feeling better since pain meds were adjusted by neurosurg. Nausea has also improved. She has no other questions or concerns at this time.    Data reviewed today: I reviewed all medications, new labs and imaging results over the last 24 hours. I personally reviewed no images or EKG's  today.    Physical Exam   Vital Signs: Temp: 99.8  F (37.7  C) Temp src: Oral BP: 132/67 Pulse: 75 Heart Rate: 92 Resp: 16 SpO2: 97 % O2 Device: None (Room air) Oxygen Delivery: 2 LPM  Weight: 140 lbs 0 oz  GEN: well-developed, well-nourished, appears comfortable. On flat bedrest.  HEENT: NCAT, PERRL, EOM intact bilaterally, sclera clear, conjunctiva normal, nose & mouth patent, mucous membranes moist  CHEST: lungs CTA bilaterally, no increased work of breathing, no wheeze, rales, rhonchi  HEART: RRR, S1 & S2, no murmur  ABD: soft, nontender, nondistended, no guarding or rigidity, hypoactive BS in all 4 quadrants  BACK: surgical dressing in place with TITI drain x2 draining serosanguinous fluid  MSK: full AROM bilateral UE/LE, pedal & radial pulses 2+ bilaterally  SKIN: warm & dry without rash, no pedal edema    Data   Recent Labs   Lab 02/06/20  0810 02/05/20  0909 02/05/20  0816  02/04/20  1150 02/04/20  1123 02/04/20  1038   WBC  --   --   --   --  10.7 10.4 10.2   HGB 8.4* 7.0* Canceled, Test credited, specimen discarded   < > 7.5* 8.4* 9.8*   MCV  --   --   --   --  100 99 98   PLT  --   --   --   --  210 212 236   NA  --  140 Canceled, Test credited, specimen discarded  --   --   --   --    POTASSIUM  --  4.0 Canceled, Test credited, specimen discarded  --   --   --   --    CHLORIDE  --  111* Canceled, Test credited, specimen discarded  --   --   --   --    CO2  --  27 Canceled, Test credited, specimen discarded  --   --   --   --    BUN  --  14 Canceled, Test credited, specimen discarded  --   --   --   --    CR  --  0.74 Canceled, Test credited, specimen discarded  --   --   --   --    ANIONGAP  --  2* Canceled, Test credited, specimen discarded  --   --   --   --    VANDANA  --  8.5 Canceled, Test credited, specimen discarded  --   --   --   --    GLC  --  94 Canceled, Test credited, specimen discarded  --   --   --   --     < > = values in this interval not displayed.     No results found for this or any  previous visit (from the past 24 hour(s)).  Medications     sodium chloride 75 mL/hr at 02/05/20 2224       acetaminophen  975 mg Oral Q8H     atorvastatin  20 mg Oral Daily     ceFAZolin  1 g Intravenous Q8H     escitalopram  20 mg Oral Daily     famotidine  20 mg Oral BID    Or     famotidine  20 mg Intravenous BID     senna-docusate  1 tablet Oral BID    Or     senna-docusate  2 tablet Oral BID     sodium chloride (PF)  3 mL Intracatheter Q8H     tamsulosin  0.4 mg Oral At Bedtime

## 2020-02-06 NOTE — PLAN OF CARE
OT: orders received and chart reviewed. Pt continues w/ positional headache and was not able to tolerate any OOB activity w/ PT. Will hold OT today

## 2020-02-06 NOTE — PROVIDER NOTIFICATION
"KINA Stuart paged, \"[phone] SV: Pt attempted PT session. Immediate 9/10 posterior headache and nausea upon sitting on edge of bed. Returned to flat BR. Please advise with any other recs. [phone] COLE Schmitt\"    ADDNEDUM: Instructed to continue slow increases in HOB as tolerated per Jesus.  "

## 2020-02-07 ENCOUNTER — APPOINTMENT (OUTPATIENT)
Dept: PHYSICAL THERAPY | Facility: CLINIC | Age: 70
DRG: 454 | End: 2020-02-07
Attending: NEUROLOGICAL SURGERY
Payer: MEDICARE

## 2020-02-07 ENCOUNTER — APPOINTMENT (OUTPATIENT)
Dept: OCCUPATIONAL THERAPY | Facility: CLINIC | Age: 70
DRG: 454 | End: 2020-02-07
Attending: PHYSICIAN ASSISTANT
Payer: MEDICARE

## 2020-02-07 PROCEDURE — 25000132 ZZH RX MED GY IP 250 OP 250 PS 637: Performed by: PHYSICIAN ASSISTANT

## 2020-02-07 PROCEDURE — 97116 GAIT TRAINING THERAPY: CPT | Mod: GP

## 2020-02-07 PROCEDURE — 97530 THERAPEUTIC ACTIVITIES: CPT | Mod: GP

## 2020-02-07 PROCEDURE — 99232 SBSQ HOSP IP/OBS MODERATE 35: CPT | Performed by: HOSPITALIST

## 2020-02-07 PROCEDURE — 97530 THERAPEUTIC ACTIVITIES: CPT | Mod: GO

## 2020-02-07 PROCEDURE — 12000000 ZZH R&B MED SURG/OB

## 2020-02-07 PROCEDURE — 25000128 H RX IP 250 OP 636: Performed by: PHYSICIAN ASSISTANT

## 2020-02-07 PROCEDURE — 25000132 ZZH RX MED GY IP 250 OP 250 PS 637: Performed by: HOSPITALIST

## 2020-02-07 PROCEDURE — 97166 OT EVAL MOD COMPLEX 45 MIN: CPT | Mod: GO

## 2020-02-07 PROCEDURE — 25800030 ZZH RX IP 258 OP 636: Performed by: PHYSICIAN ASSISTANT

## 2020-02-07 RX ORDER — SENNOSIDES 8.6 MG
1-2 TABLET ORAL 2 TIMES DAILY PRN
Status: DISCONTINUED | OUTPATIENT
Start: 2020-02-07 | End: 2020-02-12 | Stop reason: HOSPADM

## 2020-02-07 RX ORDER — POLYETHYLENE GLYCOL 3350 17 G/17G
17 POWDER, FOR SOLUTION ORAL DAILY
Status: DISCONTINUED | OUTPATIENT
Start: 2020-02-07 | End: 2020-02-09

## 2020-02-07 RX ADMIN — FAMOTIDINE 20 MG: 10 TABLET, FILM COATED ORAL at 21:46

## 2020-02-07 RX ADMIN — SODIUM CHLORIDE: 9 INJECTION, SOLUTION INTRAVENOUS at 01:59

## 2020-02-07 RX ADMIN — ESCITALOPRAM OXALATE 20 MG: 10 TABLET ORAL at 08:03

## 2020-02-07 RX ADMIN — SENNOSIDES AND DOCUSATE SODIUM 2 TABLET: 8.6; 5 TABLET ORAL at 21:46

## 2020-02-07 RX ADMIN — ACETAMINOPHEN 650 MG: 325 TABLET, FILM COATED ORAL at 21:46

## 2020-02-07 RX ADMIN — PROCHLORPERAZINE MALEATE 5 MG: 5 TABLET ORAL at 06:23

## 2020-02-07 RX ADMIN — OXYCODONE HYDROCHLORIDE 10 MG: 5 TABLET ORAL at 10:15

## 2020-02-07 RX ADMIN — OXYCODONE HYDROCHLORIDE 10 MG: 5 TABLET ORAL at 02:00

## 2020-02-07 RX ADMIN — CEFAZOLIN 1 G: 1 INJECTION, POWDER, FOR SOLUTION INTRAMUSCULAR; INTRAVENOUS at 15:57

## 2020-02-07 RX ADMIN — PROCHLORPERAZINE MALEATE 5 MG: 5 TABLET ORAL at 13:25

## 2020-02-07 RX ADMIN — ACETAMINOPHEN 975 MG: 325 TABLET, FILM COATED ORAL at 08:03

## 2020-02-07 RX ADMIN — SENNOSIDES AND DOCUSATE SODIUM 2 TABLET: 8.6; 5 TABLET ORAL at 08:04

## 2020-02-07 RX ADMIN — ACETAMINOPHEN 650 MG: 325 TABLET, FILM COATED ORAL at 15:56

## 2020-02-07 RX ADMIN — ACETAMINOPHEN 975 MG: 325 TABLET, FILM COATED ORAL at 02:00

## 2020-02-07 RX ADMIN — METHOCARBAMOL TABLETS 750 MG: 750 TABLET, COATED ORAL at 14:27

## 2020-02-07 RX ADMIN — FAMOTIDINE 20 MG: 10 TABLET, FILM COATED ORAL at 08:02

## 2020-02-07 RX ADMIN — OXYCODONE HYDROCHLORIDE 10 MG: 5 TABLET ORAL at 13:25

## 2020-02-07 RX ADMIN — METHOCARBAMOL TABLETS 750 MG: 750 TABLET, COATED ORAL at 04:20

## 2020-02-07 RX ADMIN — OXYCODONE HYDROCHLORIDE 10 MG: 5 TABLET ORAL at 18:10

## 2020-02-07 RX ADMIN — POLYETHYLENE GLYCOL 3350 17 G: 17 POWDER, FOR SOLUTION ORAL at 13:25

## 2020-02-07 RX ADMIN — CEFAZOLIN 1 G: 1 INJECTION, POWDER, FOR SOLUTION INTRAMUSCULAR; INTRAVENOUS at 07:58

## 2020-02-07 RX ADMIN — OXYCODONE HYDROCHLORIDE 10 MG: 5 TABLET ORAL at 06:23

## 2020-02-07 RX ADMIN — TAMSULOSIN HYDROCHLORIDE 0.4 MG: 0.4 CAPSULE ORAL at 21:46

## 2020-02-07 RX ADMIN — ONDANSETRON 4 MG: 4 TABLET, ORALLY DISINTEGRATING ORAL at 18:10

## 2020-02-07 RX ADMIN — OXYCODONE HYDROCHLORIDE 10 MG: 5 TABLET ORAL at 21:46

## 2020-02-07 RX ADMIN — ATORVASTATIN CALCIUM 20 MG: 20 TABLET, FILM COATED ORAL at 08:03

## 2020-02-07 ASSESSMENT — ACTIVITIES OF DAILY LIVING (ADL)
ADLS_ACUITY_SCORE: 13
ADLS_ACUITY_SCORE: 12
ADLS_ACUITY_SCORE: 12
PREVIOUS_RESPONSIBILITIES: MEAL PREP;HOUSEKEEPING;LAUNDRY;SHOPPING;DRIVING
ADLS_ACUITY_SCORE: 12
ADLS_ACUITY_SCORE: 12
ADLS_ACUITY_SCORE: 14

## 2020-02-07 ASSESSMENT — MIFFLIN-ST. JEOR: SCORE: 1210.47

## 2020-02-07 NOTE — PROGRESS NOTES
02/07/20 1500   Quick Adds   Type of Visit Initial Occupational Therapy Evaluation   Living Environment   Lives With spouse   Living Arrangements house   Home Accessibility stairs to enter home   Number of Stairs, Main Entrance 1  (does have wheelchair ramp)   Stair Railings, Main Entrance none   Transportation Anticipated family or friend will provide   Living Environment Comment tub/shower   Self-Care   Usual Activity Tolerance good   Current Activity Tolerance fair   Equipment Currently Used at Home shower chair;grab bar, tub/shower;cane, straight;walker, rolling  (hand held showerhead; toilet seat riser)   Functional Level   Ambulation 0-->independent   Transferring 0-->independent   Toileting 0-->independent   Bathing 1-->assistive equipment   Dressing 0-->independent   Eating 0-->independent   Fall history within last six months yes   Number of times patient has fallen within last six months 2   General Information   Onset of Illness/Injury or Date of Surgery - Date 02/04/20   Referring Physician Cathy Roche PA-C   Patient/Family Goals Statement Improve independence   Additional Occupational Profile Info/Pertinent History of Current Problem past medical history of GERD, HLD, ETOH abuse in remission, depression, and lumbar spinal stenosis with radiculopathy who underwent a previously scheduled L2-S1 fusion by Dr. Gomes on 2/4/2020   Precautions/Limitations fall precautions;spinal precautions  (LSO when out of bed)   Cognitive Status Examination   Orientation orientation to person, place and time   Follows Commands (Cognition) follows two step commands   Memory impaired   Attention Quiet environment required   Visual Perception   Visual Perception No deficits were identified   Pain Assessment   Patient Currently in Pain Yes, see Vital Sign flowsheet   Transfer Skills   Transfer Transfer Skill: Stand to Sit   Transfer Skill: Sit to Stand   Level of [place]: Sit/Stand minimum assist (75% patients  "effort)   Physical Assist/Nonphysical Assist: Sit/Stand verbal cues;1 person assist   Assistive Device for Transfer: Sit/Stand standard walker   Instrumental Activities of Daily Living (IADL)   Previous Responsibilities meal prep;housekeeping;laundry;shopping;driving   Activities of Daily Living Analysis   Impairments Contributing to Impaired Activities of Daily Living pain;strength decreased;balance impaired   General Therapy Interventions   Planned Therapy Interventions ADL retraining;transfer training;progressive activity/exercise;home program guidelines;orthotic fitting/training   Clinical Impression   Criteria for Skilled Therapeutic Interventions Met yes, treatment indicated   OT Diagnosis decline function post spinal fusion   Influenced by the following impairments pain;strength decreased;balance impaired; spine precaution adherence   Assessment of Occupational Performance 5 or more Performance Deficits   Identified Performance Deficits dressing, grooming, toileting, bathing, transfers, cooking   Clinical Decision Making (Complexity) Moderate complexity   Therapy Frequency Daily   Predicted Duration of Therapy Intervention (days/wks) 1 week   Anticipated Equipment Needs at Discharge reacher;dressing equipment;tub bench   Anticipated Discharge Disposition Home with Assist;Home with Home Therapy   Risks and Benefits of Treatment have been explained. Yes   Patient, Family & other staff in agreement with plan of care Yes   Clinical Impression Comments Pt functioning below baseline    [facility] TM \"6 Clicks\"   2016, Trustees of [facility], under license to [employer].  All rights reserved.   6 Clicks Short Forms Daily Activity Inpatient Short Form   [facility]  \"6 Clicks\" Daily Activity Inpatient Short Form   1. Putting on and taking off regular lower body clothing? 1 - Total   2. Bathing (including washing, rinsing, drying)? 1 - Total   3. Toileting, which includes using " toilet, bedpan or urinal? 1 - Total   4. Putting on and taking off regular upper body clothing? 1 - Total   5. Taking care of personal grooming such as brushing teeth? 3 - A Little   6. Eating meals? 4 - None   Daily Activity Raw Score (Score out of 24.Lower scores equate to lower levels of function) 11   Total Evaluation Time   Total Evaluation Time (Minutes) 10

## 2020-02-07 NOTE — PLAN OF CARE
POD3 L 2-S1 PSF. A&Ox4. CMS unchanged: baseline numbness in fingers and toes that is improving per pt and RLE weaker than LLE. VSS, RA. Temp at midnight was 101. Dressing CDI. TITI open to gravity: 40 and 20 serosanguinous output. Full liquid diet. Bedrest. Quinones patent. BS active and passing flatus. C/o back pain, decreased with tylenol, robaxin and oxycodone. Pt c/o mild headache. Per pt report, pt gets nauseous, headache and dizziness every morning, decreased with caffeine and compazine. Pt scoring green on the aggression stoplight tool. Discharge pending.

## 2020-02-07 NOTE — PROGRESS NOTES
[facility]    Neurosurgery Progress Note    Date of Service (when I saw the patient): 02/07/2020     Assessment & Plan   Michaela Dorman is a 69 year old female who was admitted on 2/4/2020 who presented with a profound back and leg pain and weakness.  Imaging demonstrated multi-level severe lumbar degeneration and central and foraminal stenosis. Subsequently underwent L2-S1 TLIF with Dr. Gomes. Today, she is lying semi upright in bed and states HA and back pain significantly improved. States she has no pain at rest. Full strength on exam. Plan to slowly continue to mobilize if tolerated. Will leave Watkins until mobilizing better. TITI drain was removed. Drain site closed with 3-0  Nylon.Tip of drain intact upon removal.  Site appears clean without erythema, fluctuation, or induration.  Patient tolerated procedure without difficulty.         Active Problems:    S/P lumbar fusion    Assessment: s/p L2-S1 TLIF    Plan:   OK to have regular diet  Mobilize/therapies if tolerated; Brace when out of bed   Drain removed and stitched, will continue other TITI for now  Continue watkins  Suture/Staple removal in 10-14 days      I have discussed the following assessment and plan with Dr. Gomes who is in agreement with initial plan and will follow up with further consultation recommendations.      Cathy Roche PA-C  [facility] [place] [address]    Tel [phone]  Pager [phone]      Interval History   Stable. No HA at current.    Physical Exam   Temp: 98.1  F (36.7  C) Temp src: Axillary BP: 103/59   Heart Rate: 95 Resp: 16 SpO2: 93 % O2 Device: None (Room air) Oxygen Delivery: 1 LPM  Vitals:    02/04/20 0600 02/07/20 0614   Weight: 140 lb (63.5 kg) 145 lb (65.8 kg)     Vital Signs with Ranges  Temp:  [98.1  F (36.7  C)-101  F (38.3  C)] 98.1  F (36.7  C)  Heart Rate:  [80-99] 95  Resp:  [16] 16  BP: (103-121)/(53-68)  "103/59  SpO2:  [93 %-97 %] 93 %  I/O last 3 completed shifts:  In: 1420 [P.O.:520; I.V.:900]  Out: 2785 [Urine:2650; Drains:135]    Heart Rate: 95, Blood pressure 103/59, pulse 75, temperature 98.1  F (36.7  C), temperature source Axillary, resp. rate 16, height 5' 7.01\" (1.702 m), weight 145 lb (65.8 kg), SpO2 93 %, not currently breastfeeding.  145 lbs 0 oz  HEENT:  Normocephalic, atraumatic.  PERRLA.  EOM s intact.    Neck:  Supple, non-tender, without lymphadenopathy.  Heart:  No peripheral edema  Lungs:  No SOB  Skin:  Warm and dry. Incision with staples intact covered with dressing. JPx2.  Extremities:  No edema, cyanosis or clubbing.    NEUROLOGICAL EXAMINATION:   Mental status:  Alert and Oriented x 3, speech is fluent.  Cranial nerves:  II-XII intact.   Motor:     Hip Flexor:                Right: 5/5  Left:  5/5  Hip Adductor:             Right:  5/5  Left:  5/5  Hip Abductor:             Right:  5/5  Left:  5/5  Gastroc Soleus:        Right:  5/5  Left:  5/5  Tib/Ant:                      Right:  5/5  Left:  5/5  EHL:                     Right:  5/5  Left:  5/5  Sensation:  intact    Medications     sodium chloride 75 mL/hr at 02/07/20 0159       atorvastatin  20 mg Oral Daily     ceFAZolin  1 g Intravenous Q8H     escitalopram  20 mg Oral Daily     famotidine  20 mg Oral BID    Or     famotidine  20 mg Intravenous BID     senna-docusate  1 tablet Oral BID    Or     senna-docusate  2 tablet Oral BID     sodium chloride (PF)  3 mL Intracatheter Q8H     tamsulosin  0.4 mg Oral At Bedtime       Data   CBC RESULTS:   Recent Labs   Lab Test 02/06/20  0810  02/04/20  1150   WBC  --   --  10.7   RBC  --   --  2.29*   HGB 8.4*   < > 7.5*   HCT  --   --  22.8*   MCV  --   --  100   MCH  --   --  32.8   MCHC  --   --  32.9   RDW  --   --  13.5   PLT  --   --  210    < > = values in this interval not displayed.     Basic Metabolic Panel:  Lab Results   Component Value Date     02/05/2020      Lab Results "   Component Value Date    POTASSIUM 4.0 02/05/2020     Lab Results   Component Value Date    CHLORIDE 111 02/05/2020     Lab Results   Component Value Date    VANDANA 8.5 02/05/2020     Lab Results   Component Value Date    CO2 27 02/05/2020     Lab Results   Component Value Date    BUN 14 02/05/2020     Lab Results   Component Value Date    CR 0.74 02/05/2020     Lab Results   Component Value Date    GLC 94 02/05/2020     INR:  Lab Results   Component Value Date    INR 0.88 02/12/2018    INR 0.91 05/14/2008

## 2020-02-07 NOTE — PROGRESS NOTES
[facility]    Medicine Progress Note - Hospitalist Service       Date of Admission:  2/4/2020  Assessment & Plan   Michaela Dorman is a 69 year old female with a past medical history of GERD, HLD, ETOH abuse in remission, depression, and lumbar spinal stenosis with radiculopathy who underwent a previously scheduled L2-S1 fusion by Dr. Gomes on 2/4/2020. Hospitalist service was asked to consult for postoperative comedical management.     Lumbar spinal stenosis s/p L2-S1 fusion on 2/4/2020  Surgery was performed using general anesthesia, EBL notably 1500.   - Postoperative monitoring, management per primary service including pain control, DVT prophylaxis  - added daily miralax. Pt hasn't had BM since admission. Takes miralax daily at home. Also added prn Senna.     Acute blood loss anemia  Hgb postop to 6.9, preop value was 12.1. received 1u PRBCs intraop, 1u postop and another 2u 2/5.  - Hgb stable     Depression/Anxiety  - Continue PTA lexapro, xanax     Hyperlipidemia  - Continue PTA statin     GERD  - Continue PTA PPI     Hx ETOH abuse  Pt & family report abstinence since 12/2019.    Diet: Regular Diet Adult    DVT Prophylaxis: Pneumatic Compression Devices  Quinones Catheter: in place, indication: Anesthesia(leave in until till tomorrow per Cathy CASTANON)    Disposition Plan   Expected discharge: per neurosurgery team.  Entered: Lali Thompson MD 02/07/2020, 2:28 PM       The patient's care was discussed with the Patient and Patient's Family.    aLli Thompson MD  Hospitalist Service  [facility]    ______________________________________________________________________    Interval History   Patient is doing better with further adjustments in pain regimen and diet advanced. Sitting at 30 degrees. She says she typically does have headaches in the mornings, they've just been much worse here. No BM since just prior to admission. She takes miralax daily at home and has at least 1  BM daily with that.     Data reviewed today: I reviewed all medications, new labs and imaging results over the last 24 hours. I personally reviewed no images or EKG's today.    Physical Exam   Vital Signs: Temp: 98.6  F (37  C) Temp src: Oral BP: 102/60   Heart Rate: 86 Resp: 16 SpO2: 96 % O2 Device: None (Room air) Oxygen Delivery: 1 LPM  Weight: 145 lbs 0 oz  GEN: well-developed, well-nourished, appears comfortable. Sitting up eating.  HEENT: NCAT, PERRL, EOM intact bilaterally, sclera clear, conjunctiva normal, nose & mouth patent, mucous membranes moist  CHEST: lungs CTA bilaterally, no increased work of breathing, no wheeze, rales, rhonchi  HEART: RRR, S1 & S2, no murmur  ABD: soft, nontender, nondistended, no guarding or rigidity, hypoactive BS in all 4 quadrants  BACK: surgical dressing in place with TITI drain x2 draining serosanguinous fluid  MSK: full AROM bilateral UE/LE, pedal & radial pulses 2+ bilaterally  SKIN: warm & dry without rash, no pedal edema    Data   Recent Labs   Lab 02/06/20  0810 02/05/20  0909 02/05/20  0816  02/04/20  1150 02/04/20  1123 02/04/20  1038   WBC  --   --   --   --  10.7 10.4 10.2   HGB 8.4* 7.0* Canceled, Test credited, specimen discarded   < > 7.5* 8.4* 9.8*   MCV  --   --   --   --  100 99 98   PLT  --   --   --   --  210 212 236   NA  --  140 Canceled, Test credited, specimen discarded  --   --   --   --    POTASSIUM  --  4.0 Canceled, Test credited, specimen discarded  --   --   --   --    CHLORIDE  --  111* Canceled, Test credited, specimen discarded  --   --   --   --    CO2  --  27 Canceled, Test credited, specimen discarded  --   --   --   --    BUN  --  14 Canceled, Test credited, specimen discarded  --   --   --   --    CR  --  0.74 Canceled, Test credited, specimen discarded  --   --   --   --    ANIONGAP  --  2* Canceled, Test credited, specimen discarded  --   --   --   --    VANDANA  --  8.5 Canceled, Test credited, specimen discarded  --   --   --   --    GLC  --   94 Canceled, Test credited, specimen discarded  --   --   --   --     < > = values in this interval not displayed.     No results found for this or any previous visit (from the past 24 hour(s)).  Medications       atorvastatin  20 mg Oral Daily     ceFAZolin  1 g Intravenous Q8H     escitalopram  20 mg Oral Daily     famotidine  20 mg Oral BID    Or     famotidine  20 mg Intravenous BID     polyethylene glycol  17 g Oral Daily     senna-docusate  1 tablet Oral BID    Or     senna-docusate  2 tablet Oral BID     sodium chloride (PF)  3 mL Intracatheter Q8H     tamsulosin  0.4 mg Oral At Bedtime

## 2020-02-07 NOTE — PROGRESS NOTES
02/07/20 1500   Quick Adds   Type of Visit Initial Occupational Therapy Evaluation   Living Environment   Lives With spouse   Living Arrangements house   Home Accessibility stairs to enter home   Number of Stairs, Main Entrance 1  (does have wheelchair ramp)   Stair Railings, Main Entrance none   Transportation Anticipated family or friend will provide   Living Environment Comment tub/shower   Self-Care   Usual Activity Tolerance good   Current Activity Tolerance fair   Equipment Currently Used at Home shower chair;grab bar, tub/shower;cane, straight;walker, rolling  (hand held showerhead; toilet seat riser)   Functional Level   Ambulation 0-->independent   Transferring 0-->independent   Toileting 0-->independent   Bathing 1-->assistive equipment   Dressing 0-->independent   Eating 0-->independent   Fall history within last six months yes   Number of times patient has fallen within last six months 2   General Information   Onset of Illness/Injury or Date of Surgery - Date 02/04/20   Referring Physician Cathy Roche PA-C   Patient/Family Goals Statement Improve independence   Additional Occupational Profile Info/Pertinent History of Current Problem past medical history of GERD, HLD, ETOH abuse in remission, depression, and lumbar spinal stenosis with radiculopathy who underwent a previously scheduled L2-S1 fusion by Dr. Gomes on 2/4/2020   Precautions/Limitations fall precautions;spinal precautions  (LSO when out of bed)   Cognitive Status Examination   Orientation orientation to person, place and time   Follows Commands (Cognition) follows two step commands   Memory impaired   Attention Quiet environment required   Visual Perception   Visual Perception No deficits were identified   Pain Assessment   Patient Currently in Pain Yes, see Vital Sign flowsheet   Transfer Skills   Transfer Transfer Skill: Stand to Sit   Transfer Skill: Sit to Stand   Level of [place]: Sit/Stand minimum assist (75% patients  "effort)   Physical Assist/Nonphysical Assist: Sit/Stand verbal cues;1 person assist   Assistive Device for Transfer: Sit/Stand standard walker   Instrumental Activities of Daily Living (IADL)   Previous Responsibilities meal prep;housekeeping;laundry;shopping;driving   Activities of Daily Living Analysis   Impairments Contributing to Impaired Activities of Daily Living pain;strength decreased;balance impaired   General Therapy Interventions   Planned Therapy Interventions ADL retraining;transfer training;progressive activity/exercise;home program guidelines;orthotic fitting/training   Clinical Impression   Criteria for Skilled Therapeutic Interventions Met yes, treatment indicated   OT Diagnosis decline function post spinal fusion   Influenced by the following impairments pain;strength decreased;balance impaired; spine precaution adherence   Assessment of Occupational Performance 5 or more Performance Deficits   Identified Performance Deficits dressing, grooming, toileting, bathing, transfers, cooking   Clinical Decision Making (Complexity) Moderate complexity   Therapy Frequency Daily   Predicted Duration of Therapy Intervention (days/wks) 1 week   Anticipated Equipment Needs at Discharge reacher;dressing equipment;tub bench   Anticipated Discharge Disposition Home with Assist;Home with Home Therapy   Risks and Benefits of Treatment have been explained. Yes   Patient, Family & other staff in agreement with plan of care Yes   Clinical Impression Comments Pt functioning below baseline    [facility] TM \"6 Clicks\"   2016, Trustees of [facility], under license to [employer].  All rights reserved.   6 Clicks Short Forms Daily Activity Inpatient Short Form   [facility]  \"6 Clicks\" Daily Activity Inpatient Short Form   1. Putting on and taking off regular lower body clothing? 1 - Total   2. Bathing (including washing, rinsing, drying)? 1 - Total   3. Toileting, which includes using " toilet, bedpan or urinal? 1 - Total   4. Putting on and taking off regular upper body clothing? 1 - Total   5. Taking care of personal grooming such as brushing teeth? 3 - A Little   6. Eating meals? 4 - None   Daily Activity Raw Score (Score out of 24.Lower scores equate to lower levels of function) 11   Total Evaluation Time   Total Evaluation Time (Minutes) 10

## 2020-02-07 NOTE — PLAN OF CARE
Patient POD#3 for L2-S1 PSF.  Rating pain in head and incision 3/10, decreased with Tylenol, Oxycodone and Robaxin.  Dressing C/D/I, right J/P removed by Cathy Roche, left J/P in place with 90cc serosanguinous drainage, J/P stripped and to gravity.  Baseline numbness in fingers and toes which is improving. Active BS, tolerating regular diet, keep watkins in for now per Cathy.  Turned and repo q2h, turns well with one assist.  Has baseline nausea since having cancer and would like compazine given prior to PT.  Discharge plan pending.

## 2020-02-07 NOTE — PLAN OF CARE
Discharge Planner PT   Patient plan for discharge: home  Current status: Pt reports back pain rated 3/10, only mild nausea at rest and with mobility this session,  present for education and very supportive. Pt requires Petra for bed mobility with cues for log roll, Petra to ariana LS corset, sit<>stand and bed>chair transfers with FWW and Petra, gait x 40' with FWW and Petra.  Barriers to return to prior living situation: level of assist required, stairs, limited activity tolerance  Recommendations for discharge: home with assist for all mobility, FWW  Rationale for recommendations: Pt demonstrated improved mobility and activity tolerance today. Anticipate home with  assist for mobility until independent.       Entered by: Hetal Reich 02/07/2020 4:26 PM

## 2020-02-08 ENCOUNTER — APPOINTMENT (OUTPATIENT)
Dept: PHYSICAL THERAPY | Facility: CLINIC | Age: 70
DRG: 454 | End: 2020-02-08
Attending: NEUROLOGICAL SURGERY
Payer: MEDICARE

## 2020-02-08 PROCEDURE — 97530 THERAPEUTIC ACTIVITIES: CPT | Mod: GP | Performed by: PHYSICAL THERAPIST

## 2020-02-08 PROCEDURE — 99232 SBSQ HOSP IP/OBS MODERATE 35: CPT | Performed by: HOSPITALIST

## 2020-02-08 PROCEDURE — 12000000 ZZH R&B MED SURG/OB

## 2020-02-08 PROCEDURE — 25000128 H RX IP 250 OP 636: Performed by: PHYSICIAN ASSISTANT

## 2020-02-08 PROCEDURE — 25000132 ZZH RX MED GY IP 250 OP 250 PS 637: Performed by: PHYSICIAN ASSISTANT

## 2020-02-08 PROCEDURE — 97116 GAIT TRAINING THERAPY: CPT | Mod: GP | Performed by: PHYSICAL THERAPIST

## 2020-02-08 PROCEDURE — 25000132 ZZH RX MED GY IP 250 OP 250 PS 637: Performed by: HOSPITALIST

## 2020-02-08 RX ORDER — BISACODYL 10 MG
10 SUPPOSITORY, RECTAL RECTAL DAILY PRN
Status: DISCONTINUED | OUTPATIENT
Start: 2020-02-08 | End: 2020-02-12 | Stop reason: HOSPADM

## 2020-02-08 RX ADMIN — OXYCODONE HYDROCHLORIDE 5 MG: 5 TABLET ORAL at 21:13

## 2020-02-08 RX ADMIN — CEFAZOLIN 1 G: 1 INJECTION, POWDER, FOR SOLUTION INTRAMUSCULAR; INTRAVENOUS at 01:03

## 2020-02-08 RX ADMIN — OXYCODONE HYDROCHLORIDE 5 MG: 5 TABLET ORAL at 14:17

## 2020-02-08 RX ADMIN — TAMSULOSIN HYDROCHLORIDE 0.4 MG: 0.4 CAPSULE ORAL at 21:12

## 2020-02-08 RX ADMIN — ACETAMINOPHEN 650 MG: 325 TABLET, FILM COATED ORAL at 21:13

## 2020-02-08 RX ADMIN — OXYCODONE HYDROCHLORIDE 10 MG: 5 TABLET ORAL at 06:22

## 2020-02-08 RX ADMIN — ACETAMINOPHEN 650 MG: 325 TABLET, FILM COATED ORAL at 17:00

## 2020-02-08 RX ADMIN — ACETAMINOPHEN 650 MG: 325 TABLET, FILM COATED ORAL at 10:52

## 2020-02-08 RX ADMIN — ESCITALOPRAM OXALATE 20 MG: 10 TABLET ORAL at 09:03

## 2020-02-08 RX ADMIN — FAMOTIDINE 20 MG: 10 TABLET, FILM COATED ORAL at 21:12

## 2020-02-08 RX ADMIN — ACETAMINOPHEN 650 MG: 325 TABLET, FILM COATED ORAL at 06:41

## 2020-02-08 RX ADMIN — CEFAZOLIN 1 G: 1 INJECTION, POWDER, FOR SOLUTION INTRAMUSCULAR; INTRAVENOUS at 16:57

## 2020-02-08 RX ADMIN — POLYETHYLENE GLYCOL 3350 17 G: 17 POWDER, FOR SOLUTION ORAL at 09:04

## 2020-02-08 RX ADMIN — OXYCODONE HYDROCHLORIDE 5 MG: 5 TABLET ORAL at 17:35

## 2020-02-08 RX ADMIN — SENNOSIDES AND DOCUSATE SODIUM 2 TABLET: 8.6; 5 TABLET ORAL at 21:12

## 2020-02-08 RX ADMIN — PROCHLORPERAZINE MALEATE 5 MG: 5 TABLET ORAL at 06:23

## 2020-02-08 RX ADMIN — OXYCODONE HYDROCHLORIDE 10 MG: 5 TABLET ORAL at 02:37

## 2020-02-08 RX ADMIN — OXYCODONE HYDROCHLORIDE 5 MG: 5 TABLET ORAL at 07:53

## 2020-02-08 RX ADMIN — SENNOSIDES AND DOCUSATE SODIUM 2 TABLET: 8.6; 5 TABLET ORAL at 09:03

## 2020-02-08 RX ADMIN — OXYCODONE HYDROCHLORIDE 10 MG: 5 TABLET ORAL at 10:56

## 2020-02-08 RX ADMIN — BISACODYL 10 MG: 10 SUPPOSITORY RECTAL at 14:46

## 2020-02-08 RX ADMIN — CEFAZOLIN 1 G: 1 INJECTION, POWDER, FOR SOLUTION INTRAMUSCULAR; INTRAVENOUS at 09:03

## 2020-02-08 RX ADMIN — ATORVASTATIN CALCIUM 20 MG: 20 TABLET, FILM COATED ORAL at 21:13

## 2020-02-08 RX ADMIN — SENNOSIDES 2 TABLET: 8.6 TABLET, FILM COATED ORAL at 06:23

## 2020-02-08 RX ADMIN — FAMOTIDINE 20 MG: 10 TABLET, FILM COATED ORAL at 09:03

## 2020-02-08 ASSESSMENT — ACTIVITIES OF DAILY LIVING (ADL)
ADLS_ACUITY_SCORE: 14
ADLS_ACUITY_SCORE: 14
ADLS_ACUITY_SCORE: 16
ADLS_ACUITY_SCORE: 14
ADLS_ACUITY_SCORE: 14
ADLS_ACUITY_SCORE: 12

## 2020-02-08 NOTE — PROGRESS NOTES
[facility]    Medicine Progress Note - Hospitalist Service       Date of Admission:  2/4/2020  Assessment & Plan   Michaela Dorman is a 69 year old female with a past medical history of GERD, HLD, ETOH abuse in remission, depression, and lumbar spinal stenosis with radiculopathy who underwent a previously scheduled L2-S1 fusion by Dr. Gomes on 2/4/2020. Hospitalist service was asked to consult for postoperative comedical management.     Lumbar spinal stenosis s/p L2-S1 fusion on 2/4/2020  Surgery was performed using general anesthesia, EBL notably 1500.   - Postoperative monitoring, management per primary service including pain control, DVT prophylaxis  - added daily miralax. Pt hasn't had BM since admission. Takes miralax daily at home. Also added prn Senna.  - added suppository today at pt request     Acute blood loss anemia  Hgb postop to 6.9, preop value was 12.1. received 1u PRBCs intraop, 1u postop and another 2u 2/5.  - Hgb stable     Depression/Anxiety  - Continue PTA lexapro, xanax     Hyperlipidemia  - Continue PTA statin     GERD  - Continue PTA PPI     Hx ETOH abuse  Pt & family report abstinence since 12/2019.    Diet: Regular Diet Adult    DVT Prophylaxis: Pneumatic Compression Devices  Quinones Catheter: in place, indication: Anesthesia    Disposition Plan   Expected discharge: per neurosurgery team.  Entered: Lali Thompson MD 02/08/2020, 12:14 PM       The patient's care was discussed with the Patient and Patient's Family.    Lali Thompson MD  Hospitalist Service  [facility]    ______________________________________________________________________    Interval History   Patient doing well. Ambulating with PT. Plans to advance activity today. Tolerating diet. Pain controlled.  at bedside. Wondering about timing of dispo.    Data reviewed today: I reviewed all medications, new labs and imaging results over the last 24 hours. I personally reviewed no images  or EKG's today.    Physical Exam   Vital Signs: Temp: 98.6  F (37  C) Temp src: Oral BP: 136/81   Heart Rate: 89 Resp: 16 SpO2: 90 % O2 Device: None (Room air) Oxygen Delivery: 1 LPM  Weight: 145 lbs 0 oz  GEN: well-developed, well-nourished, appears comfortable. Sitting up eating.  HEENT: NCAT, PERRL, EOM intact bilaterally, sclera clear, conjunctiva normal, nose & mouth patent, mucous membranes moist  CHEST: lungs CTA bilaterally, no increased work of breathing, no wheeze, rales, rhonchi  HEART: RRR, S1 & S2, no murmur  ABD: soft, nontender, nondistended, no guarding or rigidity, hypoactive BS in all 4 quadrants  BACK: surgical dressing in place with TITI drain x2 draining serosanguinous fluid  MSK: full AROM bilateral UE/LE, pedal & radial pulses 2+ bilaterally  SKIN: warm & dry without rash, no pedal edema    Data   Recent Labs   Lab 02/06/20  0810 02/05/20  0909 02/05/20  0816  02/04/20  1150 02/04/20  1123 02/04/20  1038   WBC  --   --   --   --  10.7 10.4 10.2   HGB 8.4* 7.0* Canceled, Test credited, specimen discarded   < > 7.5* 8.4* 9.8*   MCV  --   --   --   --  100 99 98   PLT  --   --   --   --  210 212 236   NA  --  140 Canceled, Test credited, specimen discarded  --   --   --   --    POTASSIUM  --  4.0 Canceled, Test credited, specimen discarded  --   --   --   --    CHLORIDE  --  111* Canceled, Test credited, specimen discarded  --   --   --   --    CO2  --  27 Canceled, Test credited, specimen discarded  --   --   --   --    BUN  --  14 Canceled, Test credited, specimen discarded  --   --   --   --    CR  --  0.74 Canceled, Test credited, specimen discarded  --   --   --   --    ANIONGAP  --  2* Canceled, Test credited, specimen discarded  --   --   --   --    VANDANA  --  8.5 Canceled, Test credited, specimen discarded  --   --   --   --    GLC  --  94 Canceled, Test credited, specimen discarded  --   --   --   --     < > = values in this interval not displayed.     No results found for this or any  previous visit (from the past 24 hour(s)).  Medications       atorvastatin  20 mg Oral Daily     ceFAZolin  1 g Intravenous Q8H     escitalopram  20 mg Oral Daily     famotidine  20 mg Oral BID    Or     famotidine  20 mg Intravenous BID     polyethylene glycol  17 g Oral Daily     senna-docusate  1 tablet Oral BID    Or     senna-docusate  2 tablet Oral BID     sodium chloride (PF)  3 mL Intravenous Q8H     sodium chloride (PF)  3 mL Intracatheter Q8H     tamsulosin  0.4 mg Oral At Bedtime

## 2020-02-08 NOTE — PLAN OF CARE
Discharge Planner PT   Patient plan for discharge: Home    Current status: Pt requires Petra at trunk for bed mobility, spouse demonstrates understanding of donning/doffing brace. Pt transfers sit<>stand to FWW with CGA. Pt ambulated 120' with FWW and CGA, w/c follow for safety. Pt needs min VC for precautions during mobility.    Barriers to return to prior living situation: none anticipated with assist of spouse    Recommendations for discharge: home with assist from spouse for all mobility, FWW    Rationale for recommendations: Pt continues to progress with mobility and activity tolerance. Anticipate safe disch to home with  assist for mobility  with FWW until independent.       Entered by: Kristie Solorio 02/08/2020 5:01 PM

## 2020-02-08 NOTE — PLAN OF CARE
Patient POD4 for L2-S1 posterior fusion. A&O x4. Mild numbness to toes, otherwise CMS intact. Dressing saturated at 0600 and changed; now C/D/I. Brace when OOB.Remained in bed throughout shift, repositioned/weight shifted q2h. Quinones patent. TITI drain intact, draining to gravity. Output: 120mL this shift. Tolerating regular diet. BS active, no BM this shift. Given PRN senna this AM. Reports pain to lower back. Decreased with repositioning, cold packs, PRN oxycodone and Tylenol. Requested PRN Compazine given in AM for baseline morning nausea. Discharge pending, continue with current POC.

## 2020-02-08 NOTE — PLAN OF CARE
POD 3 from a posterior fusion. A&O. CMS intact except N/T toes, she says it is much improved. Bowel sounds active, positive flatus, tolerating Regular diet. VSS. Dressing C/D/I. TITI 55mL. Up with one and walker, walked from bed to door twice this shift. C/o back pain, decreased with 10mg oxycodone. Pt scoring green on the Aggression Stop Light Tool. Plan therapies and possible home 1-2days

## 2020-02-08 NOTE — PLAN OF CARE
POD 4 L2 - S1 PSF.  A&O. CMS - denying any tingling in her toes, strong grasps/moving all extremities. Bowel sounds active/positive flatus/last bm day of surgery/senna - miralax-suppository provided.  Tolerating regular diet/good appetite. VSS. Dressing - clean/dry/intact, TITI drain output 30 ml/hr to gravity.  Up with 1 assist/gait belt/walker - short walk with staff/up in chair & bedside commode. C/o headache this am/relieved with tylenol/oxycodone/ice pack to posterior neck. Nausea relieved with compazine.  Pt scoring green on the Aggression Stop Light Tool.  Stacie dc'd approximately 1430.

## 2020-02-08 NOTE — PROGRESS NOTES
[facility]    Neurosurgery  Daily Post-Op Note    Assessment & Plan   Procedure(s):  LUMBAR 2-SACRAL1 TRANSFORMINAL INTERBODY FUSION   -4 Days Post-Op  Doing well.  Pain well-controlled.  Minimal headache today, resolving with icepack.   Plan:  -Advance activity as tolerated  -Continue supportive and symptomatic treatment  -Start or continue physical therapy  -will continue to work on mobility today  Quinones is in, can attempt voiding trial later today.     Venkat Shipley    Interval History   Stable overnight    Physical Exam   Temp: 98.6  F (37  C) Temp src: Oral BP: 136/81   Heart Rate: 89 Resp: 16 SpO2: 90 % O2 Device: None (Room air) Oxygen Delivery: 1 LPM  Vitals:    02/04/20 0600 02/07/20 0614   Weight: 63.5 kg (140 lb) 65.8 kg (145 lb)     Vital Signs with Ranges  Temp:  [98.6  F (37  C)-99.1  F (37.3  C)] 98.6  F (37  C)  Heart Rate:  [85-95] 89  Resp:  [14-16] 16  BP: (102-136)/(57-81) 136/81  SpO2:  [87 %-96 %] 90 %  I/O last 3 completed shifts:  In: 840 [P.O.:240; I.V.:600]  Out: 3810 [Urine:3550; Drains:260]    Alert and oriented.  Moves all extremities equally.      Incision: CDI. TITI had 120 mL overnight.       Medications        atorvastatin  20 mg Oral Daily     ceFAZolin  1 g Intravenous Q8H     escitalopram  20 mg Oral Daily     famotidine  20 mg Oral BID    Or     famotidine  20 mg Intravenous BID     polyethylene glycol  17 g Oral Daily     senna-docusate  1 tablet Oral BID    Or     senna-docusate  2 tablet Oral BID     sodium chloride (PF)  3 mL Intravenous Q8H     sodium chloride (PF)  3 mL Intracatheter Q8H     tamsulosin  0.4 mg Oral At Bedtime       Data       Venkat Shipley PA-C  [facility]  [address]  [address] [place] [address]    Tel [phone]  Pager [phone]

## 2020-02-09 ENCOUNTER — APPOINTMENT (OUTPATIENT)
Dept: OCCUPATIONAL THERAPY | Facility: CLINIC | Age: 70
DRG: 454 | End: 2020-02-09
Attending: NEUROLOGICAL SURGERY
Payer: MEDICARE

## 2020-02-09 ENCOUNTER — APPOINTMENT (OUTPATIENT)
Dept: PHYSICAL THERAPY | Facility: CLINIC | Age: 70
DRG: 454 | End: 2020-02-09
Attending: NEUROLOGICAL SURGERY
Payer: MEDICARE

## 2020-02-09 ENCOUNTER — APPOINTMENT (OUTPATIENT)
Dept: GENERAL RADIOLOGY | Facility: CLINIC | Age: 70
DRG: 454 | End: 2020-02-09
Attending: HOSPITALIST
Payer: MEDICARE

## 2020-02-09 LAB
ALBUMIN UR-MCNC: NEGATIVE MG/DL
APPEARANCE UR: CLEAR
BASOPHILS # BLD AUTO: 0 10E9/L (ref 0–0.2)
BASOPHILS NFR BLD AUTO: 0.4 %
BILIRUB UR QL STRIP: NEGATIVE
COLOR UR AUTO: ABNORMAL
DIFFERENTIAL METHOD BLD: ABNORMAL
EOSINOPHIL # BLD AUTO: 0.1 10E9/L (ref 0–0.7)
EOSINOPHIL NFR BLD AUTO: 2.3 %
ERYTHROCYTE [DISTWIDTH] IN BLOOD BY AUTOMATED COUNT: 14.8 % (ref 10–15)
GLUCOSE UR STRIP-MCNC: NEGATIVE MG/DL
HCT VFR BLD AUTO: 22.2 % (ref 35–47)
HGB BLD-MCNC: 7.4 G/DL (ref 11.7–15.7)
HGB UR QL STRIP: NEGATIVE
IMM GRANULOCYTES # BLD: 0 10E9/L (ref 0–0.4)
IMM GRANULOCYTES NFR BLD: 0.4 %
KETONES UR STRIP-MCNC: NEGATIVE MG/DL
LACTATE BLD-SCNC: 1.1 MMOL/L (ref 0.7–2)
LEUKOCYTE ESTERASE UR QL STRIP: NEGATIVE
LYMPHOCYTES # BLD AUTO: 0.7 10E9/L (ref 0.8–5.3)
LYMPHOCYTES NFR BLD AUTO: 11.7 %
MCH RBC QN AUTO: 31.1 PG (ref 26.5–33)
MCHC RBC AUTO-ENTMCNC: 33.3 G/DL (ref 31.5–36.5)
MCV RBC AUTO: 93 FL (ref 78–100)
MONOCYTES # BLD AUTO: 0.7 10E9/L (ref 0–1.3)
MONOCYTES NFR BLD AUTO: 13.3 %
NEUTROPHILS # BLD AUTO: 4 10E9/L (ref 1.6–8.3)
NEUTROPHILS NFR BLD AUTO: 71.9 %
NITRATE UR QL: NEGATIVE
NRBC # BLD AUTO: 0 10*3/UL
NRBC BLD AUTO-RTO: 0 /100
PH UR STRIP: 7.5 PH (ref 5–7)
PLATELET # BLD AUTO: 221 10E9/L (ref 150–450)
RBC # BLD AUTO: 2.38 10E12/L (ref 3.8–5.2)
RBC #/AREA URNS AUTO: 1 /HPF (ref 0–2)
SOURCE: ABNORMAL
SP GR UR STRIP: 1 (ref 1–1.03)
UROBILINOGEN UR STRIP-MCNC: NORMAL MG/DL (ref 0–2)
WBC # BLD AUTO: 5.6 10E9/L (ref 4–11)
WBC #/AREA URNS AUTO: 1 /HPF (ref 0–5)

## 2020-02-09 PROCEDURE — 71046 X-RAY EXAM CHEST 2 VIEWS: CPT

## 2020-02-09 PROCEDURE — 25000132 ZZH RX MED GY IP 250 OP 250 PS 637: Performed by: HOSPITALIST

## 2020-02-09 PROCEDURE — 97530 THERAPEUTIC ACTIVITIES: CPT | Mod: GP

## 2020-02-09 PROCEDURE — 36415 COLL VENOUS BLD VENIPUNCTURE: CPT | Performed by: HOSPITALIST

## 2020-02-09 PROCEDURE — 85025 COMPLETE CBC W/AUTO DIFF WBC: CPT | Performed by: PHYSICIAN ASSISTANT

## 2020-02-09 PROCEDURE — 25000132 ZZH RX MED GY IP 250 OP 250 PS 637: Performed by: PHYSICIAN ASSISTANT

## 2020-02-09 PROCEDURE — 99232 SBSQ HOSP IP/OBS MODERATE 35: CPT | Performed by: HOSPITALIST

## 2020-02-09 PROCEDURE — 97535 SELF CARE MNGMENT TRAINING: CPT | Mod: GO

## 2020-02-09 PROCEDURE — 83605 ASSAY OF LACTIC ACID: CPT | Performed by: HOSPITALIST

## 2020-02-09 PROCEDURE — 81001 URINALYSIS AUTO W/SCOPE: CPT | Performed by: HOSPITALIST

## 2020-02-09 PROCEDURE — 87631 RESP VIRUS 3-5 TARGETS: CPT | Performed by: PHYSICIAN ASSISTANT

## 2020-02-09 PROCEDURE — 36415 COLL VENOUS BLD VENIPUNCTURE: CPT | Performed by: PHYSICIAN ASSISTANT

## 2020-02-09 PROCEDURE — 12000000 ZZH R&B MED SURG/OB

## 2020-02-09 PROCEDURE — 25000128 H RX IP 250 OP 636: Performed by: PHYSICIAN ASSISTANT

## 2020-02-09 PROCEDURE — 87040 BLOOD CULTURE FOR BACTERIA: CPT | Performed by: HOSPITALIST

## 2020-02-09 PROCEDURE — 97116 GAIT TRAINING THERAPY: CPT | Mod: GP

## 2020-02-09 RX ORDER — POLYETHYLENE GLYCOL 3350 17 G/17G
17 POWDER, FOR SOLUTION ORAL 2 TIMES DAILY
Status: DISCONTINUED | OUTPATIENT
Start: 2020-02-09 | End: 2020-02-12 | Stop reason: HOSPADM

## 2020-02-09 RX ORDER — BISACODYL 5 MG
5 TABLET, DELAYED RELEASE (ENTERIC COATED) ORAL ONCE
Status: COMPLETED | OUTPATIENT
Start: 2020-02-09 | End: 2020-02-09

## 2020-02-09 RX ADMIN — CEFAZOLIN 1 G: 1 INJECTION, POWDER, FOR SOLUTION INTRAMUSCULAR; INTRAVENOUS at 08:07

## 2020-02-09 RX ADMIN — PROCHLORPERAZINE EDISYLATE 5 MG: 5 INJECTION INTRAMUSCULAR; INTRAVENOUS at 15:04

## 2020-02-09 RX ADMIN — OXYCODONE HYDROCHLORIDE 10 MG: 5 TABLET ORAL at 23:08

## 2020-02-09 RX ADMIN — FAMOTIDINE 20 MG: 10 TABLET, FILM COATED ORAL at 23:19

## 2020-02-09 RX ADMIN — CEFAZOLIN 1 G: 1 INJECTION, POWDER, FOR SOLUTION INTRAMUSCULAR; INTRAVENOUS at 23:09

## 2020-02-09 RX ADMIN — CEFAZOLIN 1 G: 1 INJECTION, POWDER, FOR SOLUTION INTRAMUSCULAR; INTRAVENOUS at 16:43

## 2020-02-09 RX ADMIN — ACETAMINOPHEN 650 MG: 325 TABLET, FILM COATED ORAL at 21:57

## 2020-02-09 RX ADMIN — OXYCODONE HYDROCHLORIDE 5 MG: 5 TABLET ORAL at 08:00

## 2020-02-09 RX ADMIN — PROCHLORPERAZINE MALEATE 5 MG: 5 TABLET ORAL at 23:19

## 2020-02-09 RX ADMIN — OXYCODONE HYDROCHLORIDE 5 MG: 5 TABLET ORAL at 11:36

## 2020-02-09 RX ADMIN — PROCHLORPERAZINE MALEATE 5 MG: 5 TABLET ORAL at 05:09

## 2020-02-09 RX ADMIN — ATORVASTATIN CALCIUM 20 MG: 20 TABLET, FILM COATED ORAL at 23:20

## 2020-02-09 RX ADMIN — OXYCODONE HYDROCHLORIDE 5 MG: 5 TABLET ORAL at 05:09

## 2020-02-09 RX ADMIN — ACETAMINOPHEN 650 MG: 325 TABLET, FILM COATED ORAL at 17:46

## 2020-02-09 RX ADMIN — ACETAMINOPHEN 650 MG: 325 TABLET, FILM COATED ORAL at 00:54

## 2020-02-09 RX ADMIN — CEFAZOLIN 1 G: 1 INJECTION, POWDER, FOR SOLUTION INTRAMUSCULAR; INTRAVENOUS at 00:48

## 2020-02-09 RX ADMIN — ACETAMINOPHEN 650 MG: 325 TABLET, FILM COATED ORAL at 13:16

## 2020-02-09 RX ADMIN — OXYCODONE HYDROCHLORIDE 5 MG: 5 TABLET ORAL at 14:59

## 2020-02-09 RX ADMIN — TAMSULOSIN HYDROCHLORIDE 0.4 MG: 0.4 CAPSULE ORAL at 23:25

## 2020-02-09 RX ADMIN — SENNOSIDES AND DOCUSATE SODIUM 2 TABLET: 8.6; 5 TABLET ORAL at 08:00

## 2020-02-09 RX ADMIN — ACETAMINOPHEN 650 MG: 325 TABLET, FILM COATED ORAL at 09:14

## 2020-02-09 RX ADMIN — BISACODYL 5 MG: 5 TABLET, COATED ORAL at 13:16

## 2020-02-09 RX ADMIN — OXYCODONE HYDROCHLORIDE 5 MG: 5 TABLET ORAL at 19:44

## 2020-02-09 RX ADMIN — ESCITALOPRAM OXALATE 20 MG: 10 TABLET ORAL at 08:01

## 2020-02-09 RX ADMIN — ACETAMINOPHEN 650 MG: 325 TABLET, FILM COATED ORAL at 05:09

## 2020-02-09 RX ADMIN — OXYCODONE HYDROCHLORIDE 5 MG: 5 TABLET ORAL at 19:55

## 2020-02-09 RX ADMIN — POLYETHYLENE GLYCOL 3350 17 G: 17 POWDER, FOR SOLUTION ORAL at 23:22

## 2020-02-09 RX ADMIN — FAMOTIDINE 20 MG: 10 TABLET, FILM COATED ORAL at 08:01

## 2020-02-09 RX ADMIN — OXYCODONE HYDROCHLORIDE 5 MG: 5 TABLET ORAL at 00:54

## 2020-02-09 RX ADMIN — POLYETHYLENE GLYCOL 3350 17 G: 17 POWDER, FOR SOLUTION ORAL at 08:02

## 2020-02-09 RX ADMIN — SENNOSIDES AND DOCUSATE SODIUM 2 TABLET: 8.6; 5 TABLET ORAL at 23:20

## 2020-02-09 ASSESSMENT — ACTIVITIES OF DAILY LIVING (ADL)
ADLS_ACUITY_SCORE: 16
ADLS_ACUITY_SCORE: 13
ADLS_ACUITY_SCORE: 15
ADLS_ACUITY_SCORE: 15
ADLS_ACUITY_SCORE: 13
ADLS_ACUITY_SCORE: 13

## 2020-02-09 NOTE — PLAN OF CARE
A&O, calm, pleasant and cooperative, VSS, LS clear on RA, BS audible and active, with good appetite, Continent of both B and B, has not moved her Bowel for the past 5 days, Miralax BID, Bisacodyl administered, No nausea for the shift, CMS intact, no numbness or tingling reported, With TITI patent TITI drain through gravity, Up with A1 GB and W, Ambulating well in hallway and BR, Pain 3to4/10 managed with PRN. Episode of fever during the shift. Managed with Tylenol and ice packs. MD informed.

## 2020-02-09 NOTE — PROGRESS NOTES
[facility]    Neurosurgery  Daily Post-Op Note    Assessment & Plan   Procedure(s):  LUMBAR 2-SACRAL1 TRANSFORMINAL INTERBODY FUSION   -5 Days Post-Op  Doing well.  Pain well-controlled.  Tolerating physical therapy and rehabilitation well.  Eager to be going home  Plan:  -Advance activity as tolerated  -Continue supportive and symptomatic treatment  -will discontinue other drain today and discharge her home.       Venkat Shipley    Interval History   Stable.  Doing well.  Improving slowly.  Pain is reasonably controlled.  No fevers.     Physical Exam   Temp: 99.2  F (37.3  C) Temp src: Oral BP: 128/78 Pulse: 91 Heart Rate: 86 Resp: 14 SpO2: 94 % O2 Device: None (Room air)    Vitals:    02/04/20 0600 02/07/20 0614   Weight: 63.5 kg (140 lb) 65.8 kg (145 lb)     Vital Signs with Ranges  Temp:  [98.2  F (36.8  C)-99.2  F (37.3  C)] 99.2  F (37.3  C)  Pulse:  [91] 91  Heart Rate:  [78-91] 86  Resp:  [14-16] 14  BP: (120-145)/(74-81) 128/78  SpO2:  [91 %-96 %] 94 %  I/O last 3 completed shifts:  In: 1010 [P.O.:1010]  Out: 1230 [Urine:1000; Drains:230]    Alert and oriented.  Moves all extremities equally.  Reflexes symmetrical.     Incision: CDI      Medications        atorvastatin  20 mg Oral Daily     ceFAZolin  1 g Intravenous Q8H     escitalopram  20 mg Oral Daily     famotidine  20 mg Oral BID    Or     famotidine  20 mg Intravenous BID     polyethylene glycol  17 g Oral Daily     senna-docusate  1 tablet Oral BID    Or     senna-docusate  2 tablet Oral BID     sodium chloride (PF)  3 mL Intracatheter Q8H     tamsulosin  0.4 mg Oral At Bedtime             Venkat Shipley PA-C  [facility]  [address]  [address] [place] [address]    Tel [phone]  Pager [phone]

## 2020-02-09 NOTE — PLAN OF CARE
"[id number]: Pt POD5 of L2-S1 posterior fusion. A&O x4. Denies numbness/tingling. Slight weakness to BLE, otherwise CMS intact. Dsg to back C/D/I. TITI patent, output: 30mL , draining to gravity. Up with minimal assist x1 w/ GB/walker/brace. Attempted to educate patient about brace; pt stated \"my  is going to do it after I leave.\" Voiding adequately overnight, PVRs: 66mL/52mL. BS 4+ and passing flatus; reporting feelings of constipation, no BM this shift. Scheduled Miralax to be given in AM. Tolerating regular diet, denying nausea. PRN compazine given prophylactically before breakfast per pt request. Discharge to home with spousal support pending. Continue with current POC.  "

## 2020-02-09 NOTE — PROVIDER NOTIFICATION
Page to neurosurg PA. Pt with temp, HA. Pt feeling very unwell. Any interventions?     Milagros CASTANON will order CBC. If anything new shows with her wound to indicate infection call neurosurg, otheriwse they will follow on the already oredered labs/imaging.

## 2020-02-09 NOTE — PROVIDER NOTIFICATION
Page to hospitalist: temp 102.5 oral. Please advise    UA/CXR/BC to be entered by MD    Page to neurosurg team

## 2020-02-09 NOTE — PLAN OF CARE
A&O x4. VS stable on room air. Up with assist x1 with walker and gait belt. Complained of back pain and headache, managed with PRN tylenol and oxycodone. Dressing clean, dry and intact; TITI drain to gravity drainage. CMS intact. Voiding adequate amounts this evening. Discharge plan pending.

## 2020-02-09 NOTE — PROGRESS NOTES
[facility]    Medicine Progress Note - Hospitalist Service       Date of Admission:  2/4/2020  Assessment & Plan   Michaela Dorman is a 69 year old female with a past medical history of GERD, HLD, ETOH abuse in remission, depression, and lumbar spinal stenosis with radiculopathy who underwent a previously scheduled L2-S1 fusion by Dr. Gomes on 2/4/2020. Hospitalist service was asked to consult for postoperative comedical management.     Lumbar spinal stenosis s/p L2-S1 fusion on 2/4/2020  Surgery was performed using general anesthesia, EBL notably 1500.   - Postoperative monitoring, management per primary service including pain control, DVT prophylaxis  - added daily miralax. Pt hasn't had BM since admission. Takes miralax daily at home. Also added prn Senna.  - no BM with suppository  - adding 5mg bisacodyl and increasing miralax to BID  - pt is getting 2 tabs senna BID  - consider enema if no BM by tomorrow     Acute blood loss anemia  Hgb postop to 6.9, preop value was 12.1. received 1u PRBCs intraop, 1u postop and another 2u 2/5.  - Hgb stable     Depression/Anxiety  - Continue PTA lexapro, xanax     Hyperlipidemia  - Continue PTA statin     GERD  - Continue PTA PPI     Hx ETOH abuse  Pt & family report abstinence since 12/2019.    Diet: Regular Diet Adult    DVT Prophylaxis: Pneumatic Compression Devices  Quinones Catheter: not present    Disposition Plan   Expected discharge: per neurosurgery team.  Entered: Lali Thompson MD 02/09/2020, 9:33 AM       The patient's care was discussed with the Patient and Patient's Family.    Lali Thompson MD  Hospitalist Service  [facility]    ______________________________________________________________________    Interval History   No BM despite increased bowel regimen and suppository yesterday. Pt is used to being regular. Tolerating diet. No nausea or vomiting. Working with PT. Drain still in. Will not discharge today.    Data  reviewed today: I reviewed all medications, new labs and imaging results over the last 24 hours. I personally reviewed no images or EKG's today.    Physical Exam   Vital Signs: Temp: 99.2  F (37.3  C) Temp src: Oral BP: 128/78 Pulse: 91 Heart Rate: 86 Resp: 14 SpO2: 94 % O2 Device: None (Room air)    Weight: 145 lbs 0 oz  GEN: well-developed, well-nourished, appears comfortable. Sitting up eating.  HEENT: NCAT, PERRL, EOM intact bilaterally, sclera clear, conjunctiva normal, nose & mouth patent, mucous membranes moist  CHEST: lungs CTA bilaterally, no increased work of breathing, no wheeze, rales, rhonchi  HEART: RRR, S1 & S2, no murmur  ABD: soft, nontender, nondistended, no guarding or rigidity, hypoactive BS in all 4 quadrants  BACK: surgical dressing in place with TITI drain x2 draining serosanguinous fluid  MSK: full AROM bilateral UE/LE, pedal & radial pulses 2+ bilaterally  SKIN: warm & dry without rash, no pedal edema    Data   Recent Labs   Lab 02/06/20  0810 02/05/20  0909 02/05/20  0816  02/04/20  1150 02/04/20  1123 02/04/20  1038   WBC  --   --   --   --  10.7 10.4 10.2   HGB 8.4* 7.0* Canceled, Test credited, specimen discarded   < > 7.5* 8.4* 9.8*   MCV  --   --   --   --  100 99 98   PLT  --   --   --   --  210 212 236   NA  --  140 Canceled, Test credited, specimen discarded  --   --   --   --    POTASSIUM  --  4.0 Canceled, Test credited, specimen discarded  --   --   --   --    CHLORIDE  --  111* Canceled, Test credited, specimen discarded  --   --   --   --    CO2  --  27 Canceled, Test credited, specimen discarded  --   --   --   --    BUN  --  14 Canceled, Test credited, specimen discarded  --   --   --   --    CR  --  0.74 Canceled, Test credited, specimen discarded  --   --   --   --    ANIONGAP  --  2* Canceled, Test credited, specimen discarded  --   --   --   --    VANDANA  --  8.5 Canceled, Test credited, specimen discarded  --   --   --   --    GLC  --  94 Canceled, Test credited, specimen  discarded  --   --   --   --     < > = values in this interval not displayed.     No results found for this or any previous visit (from the past 24 hour(s)).  Medications       atorvastatin  20 mg Oral Daily     ceFAZolin  1 g Intravenous Q8H     escitalopram  20 mg Oral Daily     famotidine  20 mg Oral BID    Or     famotidine  20 mg Intravenous BID     polyethylene glycol  17 g Oral Daily     senna-docusate  1 tablet Oral BID    Or     senna-docusate  2 tablet Oral BID     sodium chloride (PF)  3 mL Intracatheter Q8H     tamsulosin  0.4 mg Oral At Bedtime

## 2020-02-09 NOTE — PLAN OF CARE
Discharge Planner PT   Patient plan for discharge: home with assist from spouse  Current status: Patient able to perform supine to sit using log roll technique with SBA. Assist provided to don brace,  aware of correct way to don on patient. Sit <> stand transfers from edge of bed with FWW and SBA. Patient ambulated 260' with FWW, gait belt and CGA. Patient mildly unsteady with decreased step length but no loss of balance. Sit to supine using log roll and SBA. Patient able to state 3/3 lumbar precautions.   Barriers to return to prior living situation: none  Recommendations for discharge: home with spouse to assist with all mobility  Rationale for recommendations: Patient's  able to provide assist with all mobility as needed at home.        Entered by: Marquita Dennison 02/09/2020 1:01 PM

## 2020-02-09 NOTE — PLAN OF CARE
Discharge Planner OT   Patient plan for discharge: Home with spouse  Current status: Supine to sit with CGA using walker as bedrail. Pt completed lower body dressing tasks with supervision. Pt and spouse participated in lengthy education regarding fall prevention strategies, how to progress activity tolerance at home, tub transfers using extended tub bench, vehicle transfers, spine precaution adherence during lower body dressing and toileting, how to use walker as a bedrail. Pt and her spouse verbalized understanding of all education. They denied questions or concerns regarding discharge home tomorrow.  Barriers to return to prior living situation: none  Recommendations for discharge: Home with spouse. Recommend minimal assistance of one for functional transfers, upper body dressing, lower body dressing, bathing, toileting. Recommend total assistance with cooking, cleaning, laundry, transportation.  Rationale for recommendations: Spouse can provide recommended level of assistance. Pt has functional activity tolerance, safety awareness, adherence to precautions.       Entered by: Sue Tarango 02/09/2020 4:35 PM         Occupational Therapy Discharge Summary    Reason for therapy discharge:    All goals and outcomes met, no further needs identified.    Progress towards therapy goal(s). See goals on Care Plan in [facility] electronic health record for goal details.  Goals met    Therapy recommendation(s):    No further therapy is recommended.

## 2020-02-10 ENCOUNTER — APPOINTMENT (OUTPATIENT)
Dept: PHYSICAL THERAPY | Facility: CLINIC | Age: 70
DRG: 454 | End: 2020-02-10
Attending: NEUROLOGICAL SURGERY
Payer: MEDICARE

## 2020-02-10 LAB
FLUAV+FLUBV RNA SPEC QL NAA+PROBE: NEGATIVE
FLUAV+FLUBV RNA SPEC QL NAA+PROBE: POSITIVE
GLUCOSE BLDC GLUCOMTR-MCNC: 84 MG/DL (ref 70–99)
HGB BLD-MCNC: 7.6 G/DL (ref 11.7–15.7)
RSV RNA SPEC NAA+PROBE: NEGATIVE
SPECIMEN SOURCE: ABNORMAL

## 2020-02-10 PROCEDURE — 00000146 ZZHCL STATISTIC GLUCOSE BY METER IP

## 2020-02-10 PROCEDURE — 85018 HEMOGLOBIN: CPT | Performed by: PHYSICIAN ASSISTANT

## 2020-02-10 PROCEDURE — 97530 THERAPEUTIC ACTIVITIES: CPT | Mod: GP

## 2020-02-10 PROCEDURE — 25000128 H RX IP 250 OP 636: Performed by: PHYSICIAN ASSISTANT

## 2020-02-10 PROCEDURE — 97116 GAIT TRAINING THERAPY: CPT | Mod: GP

## 2020-02-10 PROCEDURE — 25000132 ZZH RX MED GY IP 250 OP 250 PS 637: Performed by: PHYSICIAN ASSISTANT

## 2020-02-10 PROCEDURE — 25000132 ZZH RX MED GY IP 250 OP 250 PS 637: Performed by: HOSPITALIST

## 2020-02-10 PROCEDURE — 99232 SBSQ HOSP IP/OBS MODERATE 35: CPT | Performed by: INTERNAL MEDICINE

## 2020-02-10 PROCEDURE — 25000132 ZZH RX MED GY IP 250 OP 250 PS 637: Performed by: INTERNAL MEDICINE

## 2020-02-10 PROCEDURE — 36415 COLL VENOUS BLD VENIPUNCTURE: CPT | Performed by: PHYSICIAN ASSISTANT

## 2020-02-10 PROCEDURE — 12000000 ZZH R&B MED SURG/OB

## 2020-02-10 RX ORDER — OSELTAMIVIR PHOSPHATE 75 MG/1
75 CAPSULE ORAL 2 TIMES DAILY
Status: DISCONTINUED | OUTPATIENT
Start: 2020-02-10 | End: 2020-02-12 | Stop reason: HOSPADM

## 2020-02-10 RX ADMIN — OXYCODONE HYDROCHLORIDE 10 MG: 5 TABLET ORAL at 06:20

## 2020-02-10 RX ADMIN — OXYCODONE HYDROCHLORIDE 10 MG: 5 TABLET ORAL at 10:00

## 2020-02-10 RX ADMIN — FAMOTIDINE 20 MG: 10 TABLET, FILM COATED ORAL at 20:17

## 2020-02-10 RX ADMIN — FAMOTIDINE 20 MG: 10 TABLET, FILM COATED ORAL at 08:28

## 2020-02-10 RX ADMIN — ACETAMINOPHEN 650 MG: 325 TABLET, FILM COATED ORAL at 06:20

## 2020-02-10 RX ADMIN — POLYETHYLENE GLYCOL 3350 17 G: 17 POWDER, FOR SOLUTION ORAL at 20:17

## 2020-02-10 RX ADMIN — ESCITALOPRAM OXALATE 20 MG: 10 TABLET ORAL at 08:28

## 2020-02-10 RX ADMIN — ACETAMINOPHEN 650 MG: 325 TABLET, FILM COATED ORAL at 14:45

## 2020-02-10 RX ADMIN — OXYCODONE HYDROCHLORIDE 10 MG: 5 TABLET ORAL at 22:16

## 2020-02-10 RX ADMIN — OSELTAMIVIR PHOSPHATE 75 MG: 75 CAPSULE ORAL at 10:33

## 2020-02-10 RX ADMIN — OSELTAMIVIR PHOSPHATE 75 MG: 75 CAPSULE ORAL at 22:16

## 2020-02-10 RX ADMIN — POLYETHYLENE GLYCOL 3350 17 G: 17 POWDER, FOR SOLUTION ORAL at 08:25

## 2020-02-10 RX ADMIN — METHOCARBAMOL TABLETS 750 MG: 750 TABLET, COATED ORAL at 14:45

## 2020-02-10 RX ADMIN — ATORVASTATIN CALCIUM 20 MG: 20 TABLET, FILM COATED ORAL at 20:18

## 2020-02-10 RX ADMIN — OSELTAMIVIR PHOSPHATE 75 MG: 75 CAPSULE ORAL at 01:21

## 2020-02-10 RX ADMIN — SENNOSIDES AND DOCUSATE SODIUM 2 TABLET: 8.6; 5 TABLET ORAL at 08:27

## 2020-02-10 RX ADMIN — OXYCODONE HYDROCHLORIDE 10 MG: 5 TABLET ORAL at 15:49

## 2020-02-10 RX ADMIN — METHOCARBAMOL TABLETS 750 MG: 750 TABLET, COATED ORAL at 00:18

## 2020-02-10 RX ADMIN — ACETAMINOPHEN 650 MG: 325 TABLET, FILM COATED ORAL at 22:16

## 2020-02-10 RX ADMIN — SENNOSIDES AND DOCUSATE SODIUM 2 TABLET: 8.6; 5 TABLET ORAL at 20:18

## 2020-02-10 RX ADMIN — BISACODYL 10 MG: 10 SUPPOSITORY RECTAL at 08:29

## 2020-02-10 RX ADMIN — ACETAMINOPHEN 650 MG: 325 TABLET, FILM COATED ORAL at 18:49

## 2020-02-10 RX ADMIN — MAGNESIUM CITRATE 286 ML: 1.75 LIQUID ORAL at 14:44

## 2020-02-10 RX ADMIN — OXYCODONE HYDROCHLORIDE 10 MG: 5 TABLET ORAL at 18:49

## 2020-02-10 RX ADMIN — METHOCARBAMOL TABLETS 750 MG: 750 TABLET, COATED ORAL at 08:27

## 2020-02-10 RX ADMIN — METHOCARBAMOL TABLETS 750 MG: 750 TABLET, COATED ORAL at 20:42

## 2020-02-10 RX ADMIN — CEFAZOLIN 1 G: 1 INJECTION, POWDER, FOR SOLUTION INTRAMUSCULAR; INTRAVENOUS at 10:10

## 2020-02-10 RX ADMIN — TAMSULOSIN HYDROCHLORIDE 0.4 MG: 0.4 CAPSULE ORAL at 22:16

## 2020-02-10 RX ADMIN — OXYCODONE HYDROCHLORIDE 10 MG: 5 TABLET ORAL at 12:43

## 2020-02-10 RX ADMIN — PROCHLORPERAZINE MALEATE 5 MG: 5 TABLET ORAL at 06:20

## 2020-02-10 RX ADMIN — ACETAMINOPHEN 650 MG: 325 TABLET, FILM COATED ORAL at 10:33

## 2020-02-10 ASSESSMENT — MIFFLIN-ST. JEOR: SCORE: 1204.75

## 2020-02-10 ASSESSMENT — ACTIVITIES OF DAILY LIVING (ADL)
ADLS_ACUITY_SCORE: 13

## 2020-02-10 NOTE — PROVIDER NOTIFICATION
"MD Notification    Notified Person: PA    Notified Person Name: Dyan Orourke    Notification Date/Time: 2/9/2020 at 2021    Notification Interaction: web-based page    Purpose of Notification: \"714-1 - SV - FYI patient's Hgb 7.4. Headache worsened when up to commode. Currently lying flat. Melony RN *[id number]\"        "

## 2020-02-10 NOTE — PLAN OF CARE
"POD6 of L2-S1 posterior fusion. A&O x4. Afebrile throughout shift. UA results negative. Influenza A results positive. Droplet precautions maintained and Tamiflu initiated. Up with assist x1 w/ GB/W/brace. Pt feeling weaker than baseline, able to pivot to commode. BLE weakness; otherwise CMS intact. Headache pain \"7/10\" after sitting up to bedside commode. Pain decreased with lying flat, cold packs applied and PRN Tylenol and oxycodone given. TITI drain open to gravity, total output: 115mL. Dsg to lower portion of incision saturated; dressing changed. Plan to discharge home with spousal support pending, continue with current POC.  "

## 2020-02-10 NOTE — PROGRESS NOTES
"[facility]    Neurosurgery Progress Note    Date of Service (when I saw the patient): 02/10/2020     Assessment & Plan   Michaela Dorman is a 69 year old female who was admitted on 2/4/2020 who presented with a profound back and leg pain and weakness.  Imaging demonstrated multi-level severe lumbar degeneration and central and foraminal stenosis. Subsequently underwent L2-S1 TLIF with Dr. Gomes. Today, she is lying in bed. She continues to have headaches that have improved. Full strength on exam.  Fever overnight with positive influenza swab. Tamiflu initiated by hospitalist. JPx1 with 70 out overnight.    Active Problems:    S/P lumbar fusion    Assessment: s/p L2-S1 TLIF    Plan:   -Activity as tolerated  -Continue supportive and symptomatic cares  -Likely discontinue drain today and stitch      I have discussed the following assessment and plan Dr. Gomes who is in agreement with initial plan and will follow up with further consultation recommendations.    Blanca Cordoba [facility] Neurosurgery  [address] [address]    Tel [phone]  Pager [phone]      Interval History   Stable.    Physical Exam   Temp: 97.9  F (36.6  C) Temp src: Oral BP: 122/57   Heart Rate: 83 Resp: 16 SpO2: 96 % O2 Device: None (Room air)    Vitals:    02/04/20 0600 02/07/20 0614 02/10/20 0500   Weight: 140 lb (63.5 kg) 145 lb (65.8 kg) 143 lb 11.8 oz (65.2 kg)     Vital Signs with Ranges  Temp:  [97.9  F (36.6  C)-102.5  F (39.2  C)] 97.9  F (36.6  C)  Heart Rate:  [66-95] 83  Resp:  [16] 16  BP: (120-147)/(57-81) 122/57  SpO2:  [90 %-96 %] 96 %  I/O last 3 completed shifts:  In: 240 [P.O.:240]  Out: 949 [Urine:500; Drains:449]    Heart Rate: 83, Blood pressure 122/57, pulse 91, temperature 97.9  F (36.6  C), temperature source Oral, resp. rate 16, height 5' 7.01\" (1.702 m), weight 143 lb 11.8 oz (65.2 kg), SpO2 96 %, not currently " breastfeeding.  143 lbs 11.84 oz  HEENT:  Normocephalic.  PERRLA.   Heart:  No peripheral edema  Lungs:  No SOB  Skin:  Warm and dry, good capillary refill.  Extremities:  Good radial and dorsalis pedis pulses bilaterally, no edema, cyanosis or clubbing.    NEUROLOGICAL EXAMINATION:   Mental status:  Alert and Oriented x 3, speech is fluent.  Cranial nerves:  II-XII intact.   Motor:  Strength is 5/5 throughout the upper and lower extremities  Sensation:  intact    Medications       atorvastatin  20 mg Oral Daily     ceFAZolin  1 g Intravenous Q8H     escitalopram  20 mg Oral Daily     famotidine  20 mg Oral BID    Or     famotidine  20 mg Intravenous BID     oseltamivir  75 mg Oral BID     polyethylene glycol  17 g Oral BID     senna-docusate  1 tablet Oral BID    Or     senna-docusate  2 tablet Oral BID     sodium chloride (PF)  3 mL Intracatheter Q8H     tamsulosin  0.4 mg Oral At Bedtime       Data     CBC RESULTS:   Recent Labs   Lab Test 02/10/20  0717 02/09/20  1830   WBC  --  5.6   RBC  --  2.38*   HGB 7.6* 7.4*   HCT  --  22.2*   MCV  --  93   MCH  --  31.1   MCHC  --  33.3   RDW  --  14.8   PLT  --  221     Basic Metabolic Panel:  Lab Results   Component Value Date     02/05/2020      Lab Results   Component Value Date    POTASSIUM 4.0 02/05/2020     Lab Results   Component Value Date    CHLORIDE 111 02/05/2020     Lab Results   Component Value Date    VANDANA 8.5 02/05/2020     Lab Results   Component Value Date    CO2 27 02/05/2020     Lab Results   Component Value Date    BUN 14 02/05/2020     Lab Results   Component Value Date    CR 0.74 02/05/2020     Lab Results   Component Value Date    GLC 94 02/05/2020     INR:  Lab Results   Component Value Date    INR 0.88 02/12/2018    INR 0.91 05/14/2008

## 2020-02-10 NOTE — PLAN OF CARE
Discharge Planner PT   Patient plan for discharge: home with  assist  Current status: Pt demonstrates modified independence with bed mobility using log roll technique. She requires SBA for transfers and gait x 175' with a FWW. Requires Petra to don the LS corset but she is able to describe correct technique. Gait distance limited today more by neck pain rated 8/10 with mobility than back pain rated 2-3/10 with mobility.  Barriers to return to prior living situation: level of assist, stair to enter  Recommendations for discharge: home with  assist for mobility stairs, use of FWW  Rationale for recommendations: pt is mobilizing well, anticipate home with ambulation program at hospital disch       Entered by: Hetal Reich 02/10/2020 11:58 AM

## 2020-02-10 NOTE — PROGRESS NOTES
[facility]    Medicine Progress Note - Hospitalist Service       Date of Admission:  2/4/2020  Assessment & Plan   Michaela Dorman is a 69 year old female with a past medical history of GERD, HLD, ETOH abuse in remission, depression, and lumbar spinal stenosis with radiculopathy who underwent a previously scheduled L2-S1 fusion by Dr. Gomes on 2/4/2020. Hospitalist service was asked to consult for postoperative comedical management.     Lumbar spinal stenosis s/p L2-S1 fusion on 2/4/2020  Surgery was performed using general anesthesia, EBL notably 1500.   - Postoperative monitoring, management per primary service including pain control, DVT prophylaxis  - added daily miralax. Pt hasn't had BM since admission. Takes miralax daily at home. Also added prn Senna.  - no BM with suppository  - adding 5mg bisacodyl and increasing miralax to BID  - pt is getting 2 tabs senna BID  No BM yet pink lady enema ordered one-time today  Patient complains of headache with positional change.  Management per neurosurgery team  TITI drain was removed today    Influenza A viral illness;  She tested for positive for influenza A  Started on Tamiflu twice daily for 5-day course  Supportive treatment as needed including Tylenol and Robitussin-DM       Acute blood loss anemia  Hgb postop to 6.9, preop value was 12.1. received 1u PRBCs intraop, 1u postop and another 2u 2/5.  - Hgb stable at 7.6 today from 7.4 yesterday     Depression/Anxiety  - Continue PTA lexapro, xanax     Hyperlipidemia  - Continue PTA statin     GERD  - Continue PTA PPI     Hx ETOH abuse  Pt & family report abstinence since 12/2019.    Diet: Regular Diet Adult    DVT Prophylaxis: Pneumatic Compression Devices  Quinones Catheter: not present    Disposition Plan   Expected discharge: per neurosurgery team.  Entered: Val Khan MD 02/10/2020, 11:44 AM       The patient's care was discussed with the Patient and bedside RN    Val Khan MD  Hospitalist  Service  [facility]    ______________________________________________________________________    Interval History   No BM despite increased bowel regimen and suppository yesterday.  Ordered enema today.  pt is used to being regular. Tolerating diet. No nausea or vomiting. Working with PT. complains of significant headache with the positional change especially with standing    Data reviewed today: I reviewed all medications, new labs and imaging results over the last 24 hours. I personally reviewed no images or EKG's today.    Physical Exam   Vital Signs: Temp: 97.9  F (36.6  C) Temp src: Oral BP: 122/57   Heart Rate: 83 Resp: 16 SpO2: 96 % O2 Device: None (Room air)    Weight: 143 lbs 11.84 oz  GEN: well-developed, well-nourished, appears comfortable. Laying in bed   HEENT: NCAT, PERRL, EOM intact bilaterally, sclera clear, conjunctiva normal, nose & mouth patent, mucous membranes moist  CHEST: lungs CTA bilaterally, no increased work of breathing, no wheeze, rales, rhonchi  HEART: RRR, S1 & S2, no murmur  ABD: soft, nontender, nondistended, no guarding or rigidity, hypoactive BS in all 4 quadrants  BACK: surgical dressing in place with TITI drain x2 draining serosanguinous fluid  MSK: full AROM bilateral UE/LE, pedal & radial pulses 2+ bilaterally  SKIN: warm & dry without rash, no pedal edema    Data   Recent Labs   Lab 02/10/20  0717 02/09/20  1830 02/06/20  0810 02/05/20  0909 02/05/20  0816  02/04/20  1150 02/04/20  1123   WBC  --  5.6  --   --   --   --  10.7 10.4   HGB 7.6* 7.4* 8.4* 7.0* Canceled, Test credited, specimen discarded   < > 7.5* 8.4*   MCV  --  93  --   --   --   --  100 99   PLT  --  221  --   --   --   --  210 212   NA  --   --   --  140 Canceled, Test credited, specimen discarded  --   --   --    POTASSIUM  --   --   --  4.0 Canceled, Test credited, specimen discarded  --   --   --    CHLORIDE  --   --   --  111* Canceled, Test credited, specimen discarded  --   --   --    CO2   --   --   --  27 Canceled, Test credited, specimen discarded  --   --   --    BUN  --   --   --  14 Canceled, Test credited, specimen discarded  --   --   --    CR  --   --   --  0.74 Canceled, Test credited, specimen discarded  --   --   --    ANIONGAP  --   --   --  2* Canceled, Test credited, specimen discarded  --   --   --    VANDANA  --   --   --  8.5 Canceled, Test credited, specimen discarded  --   --   --    GLC  --   --   --  94 Canceled, Test credited, specimen discarded  --   --   --     < > = values in this interval not displayed.     Recent Results (from the past 24 hour(s))   XR Chest 2 Views    Narrative    EXAM: XR CHEST 2 VW  LOCATION: [facility]  DATE/TIME: 2/9/2020 10:42 PM    INDICATION: Fever after week in hospital.  COMPARISON: 10/07/2016.      Impression    IMPRESSION: Left costophrenic angle is minimally blunted by small amount of pleural fluid or thickening. Lungs otherwise clear. Heart size within normal limits. Right chest wall port has been removed. Right shoulder arthroplasty.     Medications       atorvastatin  20 mg Oral Daily     ceFAZolin  1 g Intravenous Q8H     escitalopram  20 mg Oral Daily     famotidine  20 mg Oral BID    Or     famotidine  20 mg Intravenous BID     oseltamivir  75 mg Oral BID     pink lady enema  286 mL Rectal Once     polyethylene glycol  17 g Oral BID     senna-docusate  1 tablet Oral BID    Or     senna-docusate  2 tablet Oral BID     sodium chloride (PF)  3 mL Intracatheter Q8H     tamsulosin  0.4 mg Oral At Bedtime

## 2020-02-10 NOTE — PROGRESS NOTES
TITI drain was removed. Drain site closed with suture.  Tip of drain intact upon removal.  Site appears clean without erythema, fluctuation, or induration.  Patient tolerated procedure without difficulty.

## 2020-02-10 NOTE — PROVIDER NOTIFICATION
"MD Notification    Notified Person: MD    Notified Person Name: Dr. Barnett    Notification Date/Time: 2/10/2020 at 0007    Notification Interaction: web-based page    Purpose of Notification: \"714 -1: SV - FYI patient positive for Influenza A, droplet precautions are being maintained. COLE Ty *[id number]\"    Order Received: Will be ordering Tamiflu for patient    Comments:    " [Post Op Day: ___] : post op day #[unfilled] [Procedure: ___] : status post [unfilled] [Indication: ___] : for [unfilled] [de-identified] : seen in ER for bleeding from [place]. Dr Otto spoke to me about wound seroma which was cultures, but more concerned about left breast abscess for which he started pt on keflex.

## 2020-02-11 ENCOUNTER — APPOINTMENT (OUTPATIENT)
Dept: PHYSICAL THERAPY | Facility: CLINIC | Age: 70
DRG: 454 | End: 2020-02-11
Attending: NEUROLOGICAL SURGERY
Payer: MEDICARE

## 2020-02-11 LAB
GLUCOSE BLDC GLUCOMTR-MCNC: 91 MG/DL (ref 70–99)
HGB BLD-MCNC: 8.1 G/DL (ref 11.7–15.7)

## 2020-02-11 PROCEDURE — 36415 COLL VENOUS BLD VENIPUNCTURE: CPT | Performed by: INTERNAL MEDICINE

## 2020-02-11 PROCEDURE — 25000132 ZZH RX MED GY IP 250 OP 250 PS 637: Performed by: INTERNAL MEDICINE

## 2020-02-11 PROCEDURE — 97530 THERAPEUTIC ACTIVITIES: CPT | Mod: GP

## 2020-02-11 PROCEDURE — 85018 HEMOGLOBIN: CPT | Performed by: INTERNAL MEDICINE

## 2020-02-11 PROCEDURE — 12000000 ZZH R&B MED SURG/OB

## 2020-02-11 PROCEDURE — 99232 SBSQ HOSP IP/OBS MODERATE 35: CPT | Performed by: INTERNAL MEDICINE

## 2020-02-11 PROCEDURE — 97116 GAIT TRAINING THERAPY: CPT | Mod: GP

## 2020-02-11 PROCEDURE — 25000132 ZZH RX MED GY IP 250 OP 250 PS 637: Performed by: HOSPITALIST

## 2020-02-11 PROCEDURE — 00000146 ZZHCL STATISTIC GLUCOSE BY METER IP

## 2020-02-11 PROCEDURE — 25000132 ZZH RX MED GY IP 250 OP 250 PS 637: Performed by: PHYSICIAN ASSISTANT

## 2020-02-11 RX ORDER — MAGNESIUM CARB/ALUMINUM HYDROX 105-160MG
296 TABLET,CHEWABLE ORAL ONCE
Status: COMPLETED | OUTPATIENT
Start: 2020-02-11 | End: 2020-02-11

## 2020-02-11 RX ADMIN — FAMOTIDINE 20 MG: 10 TABLET, FILM COATED ORAL at 09:25

## 2020-02-11 RX ADMIN — OSELTAMIVIR PHOSPHATE 75 MG: 75 CAPSULE ORAL at 09:26

## 2020-02-11 RX ADMIN — PROCHLORPERAZINE MALEATE 5 MG: 5 TABLET ORAL at 07:28

## 2020-02-11 RX ADMIN — TAMSULOSIN HYDROCHLORIDE 0.4 MG: 0.4 CAPSULE ORAL at 20:19

## 2020-02-11 RX ADMIN — PROCHLORPERAZINE MALEATE 5 MG: 5 TABLET ORAL at 15:55

## 2020-02-11 RX ADMIN — FAMOTIDINE 20 MG: 10 TABLET, FILM COATED ORAL at 19:32

## 2020-02-11 RX ADMIN — ATORVASTATIN CALCIUM 20 MG: 20 TABLET, FILM COATED ORAL at 19:32

## 2020-02-11 RX ADMIN — OXYCODONE HYDROCHLORIDE 10 MG: 5 TABLET ORAL at 12:49

## 2020-02-11 RX ADMIN — ACETAMINOPHEN 650 MG: 325 TABLET, FILM COATED ORAL at 01:48

## 2020-02-11 RX ADMIN — METHOCARBAMOL TABLETS 750 MG: 750 TABLET, COATED ORAL at 20:19

## 2020-02-11 RX ADMIN — METHOCARBAMOL TABLETS 750 MG: 750 TABLET, COATED ORAL at 13:31

## 2020-02-11 RX ADMIN — OXYCODONE HYDROCHLORIDE 10 MG: 5 TABLET ORAL at 19:32

## 2020-02-11 RX ADMIN — ACETAMINOPHEN 650 MG: 325 TABLET, FILM COATED ORAL at 11:35

## 2020-02-11 RX ADMIN — ACETAMINOPHEN 650 MG: 325 TABLET, FILM COATED ORAL at 15:55

## 2020-02-11 RX ADMIN — ACETAMINOPHEN 650 MG: 325 TABLET, FILM COATED ORAL at 07:28

## 2020-02-11 RX ADMIN — OXYCODONE HYDROCHLORIDE 10 MG: 5 TABLET ORAL at 15:55

## 2020-02-11 RX ADMIN — ACETAMINOPHEN 650 MG: 325 TABLET, FILM COATED ORAL at 20:18

## 2020-02-11 RX ADMIN — OXYCODONE HYDROCHLORIDE 10 MG: 5 TABLET ORAL at 01:48

## 2020-02-11 RX ADMIN — OXYCODONE HYDROCHLORIDE 10 MG: 5 TABLET ORAL at 09:26

## 2020-02-11 RX ADMIN — POLYETHYLENE GLYCOL 3350 17 G: 17 POWDER, FOR SOLUTION ORAL at 09:25

## 2020-02-11 RX ADMIN — OXYCODONE HYDROCHLORIDE 10 MG: 5 TABLET ORAL at 05:43

## 2020-02-11 RX ADMIN — OSELTAMIVIR PHOSPHATE 75 MG: 75 CAPSULE ORAL at 19:32

## 2020-02-11 RX ADMIN — OXYCODONE HYDROCHLORIDE 10 MG: 5 TABLET ORAL at 22:30

## 2020-02-11 RX ADMIN — ESCITALOPRAM OXALATE 20 MG: 10 TABLET ORAL at 09:26

## 2020-02-11 RX ADMIN — METHOCARBAMOL TABLETS 750 MG: 750 TABLET, COATED ORAL at 07:28

## 2020-02-11 RX ADMIN — SENNOSIDES AND DOCUSATE SODIUM 2 TABLET: 8.6; 5 TABLET ORAL at 09:26

## 2020-02-11 RX ADMIN — MAGNESIUM CITRATE 296 ML: 1.75 LIQUID ORAL at 13:06

## 2020-02-11 ASSESSMENT — ACTIVITIES OF DAILY LIVING (ADL)
ADLS_ACUITY_SCORE: 13
ADLS_ACUITY_SCORE: 15
ADLS_ACUITY_SCORE: 13
ADLS_ACUITY_SCORE: 13

## 2020-02-11 NOTE — PROGRESS NOTES
"BRIEF NUTRITION ASSESSMENT      REASON FOR ASSESSMENT:  Michaela Dorman is a 69 year old female seen by Registered Dietitian for [facility]    NUTRITION HISTORY:  Patient reports that she watches her weight at baseline and tries to keep to < 2171-9626 kcal per day.  Was taking an appetite suppressant at home in order to keep her weight down.     CURRENT DIET AND INTAKE:  Diet:  Regular               Patient states that she has a good appetite and is eating 100% of meals.  Breakfast this morning was Turkish toast, pineapple, fruit ice, an orange, and coffee.  Dinner last night consisted of cod, mashed potatoes, green beans, ice cream, milk, and coffee.     ANTHROPOMETRICS:  Height: 5' 7\"  Weight:  65.2 kg (144#)(2/10)  Body mass index is 22.51 kg/m    Weight Status: Normal BMI  IBW:  61.4 kg   %IBW: 106%  Weight History:   Wt Readings from Last 10 Encounters:   02/10/20 65.2 kg (143 lb 11.8 oz)   01/23/20 62.9 kg (138 lb 9.6 oz)   01/13/20 61.7 kg (136 lb)   01/09/20 62.6 kg (138 lb)   01/02/20 62.7 kg (138 lb 3.2 oz)   11/21/19 63.5 kg (139 lb 14.4 oz)   09/18/19 63.5 kg (140 lb)   09/09/19 63.8 kg (140 lb 11.2 oz)   05/14/19 61.6 kg (135 lb 14.4 oz)   04/08/19 62.3 kg (137 lb 6.4 oz)   Patient states that her usual weight is 135#      LABS:  Labs noted    MALNUTRITION:  Visual Nutrition Focused Physical Assessment (NFPA) completed. Patient does not meet two of the following criteria necessary for diagnosing malnutrition: significant weight loss, reduced intake, subcutaneous fat loss, muscle loss or fluid retention.     NUTRITION INTERVENTION:  Nutrition Diagnosis:  No nutrition diagnosis at this time.    Implementation:  Nutrition Education:  Per Provider order if indicated.    FOLLOW UP/MONITORING:   Will re-evaluate in 7 - 10 days, or sooner, if re-consulted.    Mireya Gorman RD, LD, CNSC   Clinical Dietitian - [facility]             "

## 2020-02-11 NOTE — PROGRESS NOTES
[facility]    Neurosurgery  Daily Post-Op Note    Assessment & Plan   Procedure(s):  LUMBAR 2-SACRAL1 TRANSFORMINAL INTERBODY FUSION   -7 Days Post-Op  Doing well.  Pain well-controlled.  Passing flatus, no bm. Drains are out. No headaches noted.   Plan:  -Advance activity as tolerated  -Continue supportive and symptomatic treatment  -will likely discharge home once bowels move.   -bowel protocol       Venkat Shipley    Interval History   Stable.  Doing well.  Improving slowly.  Pain is reasonably controlled.  No fevers.     Physical Exam   Temp: 99  F (37.2  C) Temp src: Oral BP: (!) 143/74   Heart Rate: 77 Resp: 16 SpO2: 95 % O2 Device: None (Room air)    Vitals:    02/04/20 0600 02/07/20 0614 02/10/20 0500   Weight: 63.5 kg (140 lb) 65.8 kg (145 lb) 65.2 kg (143 lb 11.8 oz)     Vital Signs with Ranges  Temp:  [98  F (36.7  C)-99.3  F (37.4  C)] 99  F (37.2  C)  Heart Rate:  [64-77] 77  Resp:  [14-16] 16  BP: (120-152)/(64-92) 143/74  SpO2:  [95 %-97 %] 95 %  I/O last 3 completed shifts:  In: 880 [P.O.:880]  Out: 1875 [Urine:1875]    Alert and oriented.  Moves all extremities equally.  Reflexes symmetrical.     Incision: CDI      Medications        atorvastatin  20 mg Oral Daily     escitalopram  20 mg Oral Daily     famotidine  20 mg Oral BID    Or     famotidine  20 mg Intravenous BID     oseltamivir  75 mg Oral BID     polyethylene glycol  17 g Oral BID     senna-docusate  1 tablet Oral BID    Or     senna-docusate  2 tablet Oral BID     sodium chloride (PF)  3 mL Intracatheter Q8H     tamsulosin  0.4 mg Oral At Bedtime       Data       Venkat hSipley PA-C  [facility] Neurosurgery  [address]  [address] [place] [id number]    Tel [phone]  Pager [phone]

## 2020-02-11 NOTE — PROGRESS NOTES
[facility]    Medicine Progress Note - Hospitalist Service       Date of Admission:  2/4/2020  Assessment & Plan   Michaela Dorman is a 69 year old female with a past medical history of GERD, HLD, ETOH abuse in remission, depression, and lumbar spinal stenosis with radiculopathy who underwent a previously scheduled L2-S1 fusion by Dr. Gomes on 2/4/2020. Hospitalist service was asked to consult for postoperative comedical management.     Lumbar spinal stenosis s/p L2-S1 fusion on 2/4/2020  Surgery was performed using general anesthesia, EBL notably 1500.   - Postoperative monitoring, management per primary service including pain control, DVT prophylaxis  - added daily miralax. Pt hasn't had BM since admission. Takes miralax daily at home. Also added prn Senna.  - no BM with suppository  - adding 5mg bisacodyl and increasing miralax to BID  - pt is getting 2 tabs senna BID  No BM yet pink lady enema ordered one-time today  Patient complains of headache with positional change.  Headache much improved today   TITI drain was removed on 2/10/2020  Still constipated no BM yet ordered 1 bottle of mag citrate today  Abdomen is soft and passing flatus    Influenza A viral illness;  She tested for positive for influenza A  Started on Tamiflu twice daily for 5-day course  Supportive treatment as needed including Tylenol and Robitussin-DM  Flu symptoms improved       Acute blood loss anemia  Hgb postop to 6.9, preop value was 12.1. received 1u PRBCs intraop, 1u postop and another 2u 2/5.  - Hgb stable at 7.6 today from 7.4 yesterday  Hemoglobin stable at 8.1 today     Depression/Anxiety  - Continue PTA lexapro, xanax     Hyperlipidemia  - Continue PTA statin     GERD  - Continue PTA PPI     Hx ETOH abuse  Pt & family report abstinence since 12/2019.    Diet: Regular Diet Adult    DVT Prophylaxis: Pneumatic Compression Devices  Quinones Catheter: not present    Disposition Plan   Expected discharge: Most likely  tomorrow per neurosurgery team.  Entered: Val Khan MD 02/11/2020, 12:01 PM       The patient's care was discussed with the Patient and bedside RN    Val Khan MD  Hospitalist Service  [facility]    ______________________________________________________________________    Interval History   No BM despite increased bowel regimen and enema yesterday.    pt is used to being regular. Tolerating diet. No nausea or vomiting. Working with PT. headache much improved.  Feels lot better today    Data reviewed today: I reviewed all medications, new labs and imaging results over the last 24 hours. I personally reviewed no images or EKG's today.    Physical Exam   Vital Signs: Temp: 99  F (37.2  C) Temp src: Oral BP: (!) 143/74   Heart Rate: 77 Resp: 16 SpO2: 95 % O2 Device: None (Room air)    Weight: 143 lbs 11.84 oz  GEN: well-developed, well-nourished, appears comfortable. Laying in bed   HEENT: NCAT, PERRL, EOM intact bilaterally, sclera clear, conjunctiva normal, nose & mouth patent, mucous membranes moist  CHEST: lungs CTA bilaterally, no increased work of breathing, no wheeze, rales, rhonchi  HEART: RRR, S1 & S2, no murmur  ABD: soft, nontender, nondistended, no guarding or rigidity, hypoactive BS in all 4 quadrants  BACK: surgical dressing in place with TITI drain x2 draining serosanguinous fluid  MSK: full AROM bilateral UE/LE, pedal & radial pulses 2+ bilaterally  SKIN: warm & dry without rash, no pedal edema    Data   Recent Labs   Lab 02/11/20  0743 02/10/20  0717 02/09/20  1830  02/05/20  0909 02/05/20  0816   WBC  --   --  5.6  --   --   --    HGB 8.1* 7.6* 7.4*   < > 7.0* Canceled, Test credited, specimen discarded   MCV  --   --  93  --   --   --    PLT  --   --  221  --   --   --    NA  --   --   --   --  140 Canceled, Test credited, specimen discarded   POTASSIUM  --   --   --   --  4.0 Canceled, Test credited, specimen discarded   CHLORIDE  --   --   --   --  111* Canceled, Test  credited, specimen discarded   CO2  --   --   --   --  27 Canceled, Test credited, specimen discarded   BUN  --   --   --   --  14 Canceled, Test credited, specimen discarded   CR  --   --   --   --  0.74 Canceled, Test credited, specimen discarded   ANIONGAP  --   --   --   --  2* Canceled, Test credited, specimen discarded   VANDANA  --   --   --   --  8.5 Canceled, Test credited, specimen discarded   GLC  --   --   --   --  94 Canceled, Test credited, specimen discarded    < > = values in this interval not displayed.     No results found for this or any previous visit (from the past 24 hour(s)).  Medications       atorvastatin  20 mg Oral Daily     escitalopram  20 mg Oral Daily     famotidine  20 mg Oral BID    Or     famotidine  20 mg Intravenous BID     magnesium citrate  296 mL Oral Once     oseltamivir  75 mg Oral BID     polyethylene glycol  17 g Oral BID     senna-docusate  1 tablet Oral BID    Or     senna-docusate  2 tablet Oral BID     sodium chloride (PF)  3 mL Intracatheter Q8H     tamsulosin  0.4 mg Oral At Bedtime

## 2020-02-11 NOTE — PLAN OF CARE
POD 7 from a L2-S1 fusion. A&O x4. BLE slight weakness, other CMS intact. Bowel sounds active, positive flatus, tolerating regular diet. No BM yet. VSS. Back incision JOVANA, stitches from drain insertion sites with erythema. Up with 1 GB/walker/brace. Voiding adequately. Getting prn tylenol, oxycodone, and robaxin for pain. Pt scoring green on the Aggression Stop Light Tool. Plan for discharge home when ready.

## 2020-02-11 NOTE — PLAN OF CARE
Discharge Planner PT   Patient plan for discharge: home with  assist  Current status: Pt supine in bed upon arrival; agreeable to therapy. Pt able to recall spinal precautions. Performed supine <> sit using log roll technique with Mod I. SBA and cues to ariana/doff abdominal binder. Ambulated 475 ft x 1 with FWW and CGA. Cues needed for walker management/technique with change in direction. Toilet transfer completed with SBA. Pt initially wanting therapist to assist with pericare. With instruction, pt able to complete IND. Returned to bed at end of session.   Barriers to return to prior living situation: level of assist, stair to enter  Recommendations for discharge: home with  assist for stair navigation and use of FWW per plan established by the PT.  Rationale for recommendations: pt is mobilizing well, anticipate home with ambulation program at hospital disch       Entered by: Mireya Benjamin 02/11/2020 12:32 PM

## 2020-02-11 NOTE — PLAN OF CARE
A and O x4. HA and neck pain this morning especially when up  out of bed. Now complains only of incisional pain, ambulating in hallway with 1, belt, walker. Brace on when OOB. Drain removed, no drainage. Incision JOVANA, staples intact and well approximated. No N/T. Good appetite on regular diet. Scoring green on aggression screening tool. Droplet precautions maintained for influenza A.

## 2020-02-12 ENCOUNTER — APPOINTMENT (OUTPATIENT)
Dept: PHYSICAL THERAPY | Facility: CLINIC | Age: 70
DRG: 454 | End: 2020-02-12
Attending: NEUROLOGICAL SURGERY
Payer: MEDICARE

## 2020-02-12 VITALS
SYSTOLIC BLOOD PRESSURE: 137 MMHG | OXYGEN SATURATION: 97 % | HEART RATE: 91 BPM | TEMPERATURE: 98.5 F | WEIGHT: 143.74 LBS | HEIGHT: 67 IN | RESPIRATION RATE: 14 BRPM | DIASTOLIC BLOOD PRESSURE: 69 MMHG | BODY MASS INDEX: 22.56 KG/M2

## 2020-02-12 PROCEDURE — 99232 SBSQ HOSP IP/OBS MODERATE 35: CPT | Performed by: INTERNAL MEDICINE

## 2020-02-12 PROCEDURE — 25000132 ZZH RX MED GY IP 250 OP 250 PS 637: Performed by: PHYSICIAN ASSISTANT

## 2020-02-12 PROCEDURE — 97530 THERAPEUTIC ACTIVITIES: CPT | Mod: GP

## 2020-02-12 PROCEDURE — 25000132 ZZH RX MED GY IP 250 OP 250 PS 637: Performed by: INTERNAL MEDICINE

## 2020-02-12 RX ORDER — METHOCARBAMOL 750 MG/1
750 TABLET, FILM COATED ORAL EVERY 6 HOURS PRN
Qty: 40 TABLET | Refills: 0 | Status: SHIPPED | OUTPATIENT
Start: 2020-02-12 | End: 2020-03-02

## 2020-02-12 RX ORDER — POLYETHYLENE GLYCOL 3350 17 G/17G
17 POWDER, FOR SOLUTION ORAL EVERY MORNING
COMMUNITY
Start: 2020-02-12 | End: 2022-02-21

## 2020-02-12 RX ORDER — OSELTAMIVIR PHOSPHATE 75 MG/1
75 CAPSULE ORAL 2 TIMES DAILY
Qty: 5 CAPSULE | Refills: 0 | Status: SHIPPED | OUTPATIENT
Start: 2020-02-12 | End: 2020-06-16

## 2020-02-12 RX ORDER — OXYCODONE HYDROCHLORIDE 5 MG/1
5-10 TABLET ORAL
Qty: 40 TABLET | Refills: 0 | Status: SHIPPED | OUTPATIENT
Start: 2020-02-12 | End: 2020-02-18

## 2020-02-12 RX ORDER — AMOXICILLIN 250 MG
2 CAPSULE ORAL 2 TIMES DAILY
Qty: 60 TABLET | Refills: 0 | Status: SHIPPED | OUTPATIENT
Start: 2020-02-12 | End: 2021-07-15

## 2020-02-12 RX ADMIN — PROCHLORPERAZINE MALEATE 5 MG: 5 TABLET ORAL at 04:50

## 2020-02-12 RX ADMIN — METHOCARBAMOL TABLETS 750 MG: 750 TABLET, COATED ORAL at 14:07

## 2020-02-12 RX ADMIN — FAMOTIDINE 20 MG: 10 TABLET, FILM COATED ORAL at 08:15

## 2020-02-12 RX ADMIN — ESCITALOPRAM OXALATE 20 MG: 10 TABLET ORAL at 08:16

## 2020-02-12 RX ADMIN — ACETAMINOPHEN 650 MG: 325 TABLET, FILM COATED ORAL at 00:48

## 2020-02-12 RX ADMIN — METHOCARBAMOL TABLETS 750 MG: 750 TABLET, COATED ORAL at 08:16

## 2020-02-12 RX ADMIN — ACETAMINOPHEN 650 MG: 325 TABLET, FILM COATED ORAL at 04:50

## 2020-02-12 RX ADMIN — OXYCODONE HYDROCHLORIDE 10 MG: 5 TABLET ORAL at 09:09

## 2020-02-12 RX ADMIN — OXYCODONE HYDROCHLORIDE 10 MG: 5 TABLET ORAL at 01:31

## 2020-02-12 RX ADMIN — OSELTAMIVIR PHOSPHATE 75 MG: 75 CAPSULE ORAL at 08:16

## 2020-02-12 RX ADMIN — OXYCODONE HYDROCHLORIDE 10 MG: 5 TABLET ORAL at 04:50

## 2020-02-12 RX ADMIN — ACETAMINOPHEN 650 MG: 325 TABLET, FILM COATED ORAL at 13:10

## 2020-02-12 RX ADMIN — OXYCODONE HYDROCHLORIDE 10 MG: 5 TABLET ORAL at 13:10

## 2020-02-12 ASSESSMENT — ACTIVITIES OF DAILY LIVING (ADL)
ADLS_ACUITY_SCORE: 15

## 2020-02-12 NOTE — DISCHARGE INSTRUCTIONS
Spine and Brain Clinic at [facility]  Dr. Gomes Discharge Instructions Following Spine Surgery  [phone]  Monday - Friday; 8:00 AM - 4:00 PM    In General:   After you have had surgery on your spine, remember do not twist, or excessively flex or extend the area that you had surgery.  These activities can prevent healing.  Pain is normal and to be expected following surgery.  Please call our office to schedule your appointment follow up appointment.      Bowel Care:  Many people have constipation (hard stools) after surgery.  To help prevent constipation: Drink plenty of fluid (8-10 glasses/day); Eat more fiber, such as whole grain bread, bran cereal, and fruits and vegetables; Stay active by walking; Over the counter stool softener may also help.   **Restart your Miralax tomorrow. If not effective, add in daily senna.**    Medications:  Spine surgery and pain management is unique to all patients.  You will generally be given medications for pain, muscle spasms or tightness, and for constipation during the immediate post op period.  It is important that you use these as prescribed.  Please remember to bring your pill bottles to all of your appointments. Avoid alcoholic beverages while taking narcotic pain medications. You can use ice to areas of pain as needed, 20 minutes at a time.  Changing positions and walking will help loosen your muscles as well.    Driving:  No driving while on narcotic pain medications.  It is state law not to drive while under the influence of a drug to a degree which renders you incapable of safely driving.  The narcotic medication you will be taking after surgery falls under this category.     Activity:   After surgery, most people feel less pain than they have had in a long time.  Walking and light activities will help you regain the use of your muscles.  You are encouraged to walk: start with short walks 5-10 minutes at a time for 4-5 times per day and increase as  tolerated.  Stair climbing as tolerated, we recommend you use the railing. No lifting greater than 10 pounds: approximately equal to one gallon of milk. No twisting, bending in the area you have had surgery. No housework, vacuuming, laundry, leaf raking, lawn mowing, or snow removal. Wear your brace (if ordered) as directed.    Showers:  If you have sutures or staples you may shower two days after surgery. It is ok to let water run over your incision but do not touch or scrub on the incision. Pat dry immediately after showering. If there is a dressing in place, you may remove it 2 days after surgery. If you were closed with Derma mead (glue), you may shower without covering the incision. No baths, hot tubs, or pool activity for at least 6 weeks. **You have both stitches and staples.**    Nutrition:  In general, your diet restrictions will not change with your surgery.  You may need to eat small frequent meals initially until your appetite returns.  Eat plenty of high fiber foods and drink plenty of fluids. If you do not have a fluid restriction from or prior to surgery, we recommend 6-8 (8oz) glasses of water per day. Other fluids are fine, but water is best. Nausea is not uncommon; it is a common side effect to many pain medications.  We recommend that you take the pain medications with food, if this does not improve your symptoms, please call us.     Smoking:  For proper healing it is required that you quit using all tobacco products.  This includes smoking, chewing, nicotine gums, and nicotine patches.  Call Dr. Gomes if these occur: Drainage from your incision, increased pain/redness/swelling, temperatures greater than 101.5, increased leg pain or swelling or unrelieved headaches    Go to the nearest Emergency Room if you experience: chest pain, shortness of breath, neck swelling or swallowing problems      Please protect friends and family from the flu: restrict visitors and wear a mask when around others for  7-10 days after you began to have symptoms. Call your primary care physician if your symptoms return or worsen.

## 2020-02-12 NOTE — PLAN OF CARE
A and O x4. Back pain well controlled with oxycodone, robaxin, and tylenol. No HA. Denies N/T. Good strength, LLE weaker than R at times. Up with 1, belt, walker. Ambulated in hallway. Good appetite on regular diet. Large BM this evening. Scoring green on aggression screening tool. Anticipate discharge home with family tomorrow.

## 2020-02-12 NOTE — DISCHARGE SUMMARY
[facility]    Discharge Summary  Neurosurgery    Date of Admission:  2/4/2020  Date of Discharge:  2/12/2020  Discharging Provider: Blanca Cordoba  Date of Service (when I saw the patient): 02/12/20    Discharge Diagnoses   Active Problems:    S/P lumbar fusion      History of Present Illness   Michaela Dorman is a 69 year old female who was admitted on 2/4/2020 who presented with a profound back and leg pain and weakness.  Imaging demonstrated multi-level severe lumbar degeneration and central and foraminal stenosis. Subsequently underwent L2-S1 TLIF with Dr. Gomes. Postoperatively she had headaches which resolved following drain removal. She also developed a fever and influenza swab returned positive for influenza A. She was started on Tamiflu.     Hospital Course   Michaela Dorman was admitted on 2/4/2020.  The following problems were addressed during her hospitalization:    Active Problems:    S/P lumbar fusion    Assessment: s/p L2-S1 TLIF    Plan: Discharge to home with follow up in clinic in 2 weeks for staple/suture removal and in 6 weeks with lumbar XR prior.     Per Hosplitalist note 2/11/2020:    Influenza A viral illness;  She tested for positive for influenza A  Started on Tamiflu twice daily for 5-day course  Supportive treatment as needed including Tylenol and Robitussin-DM  Flu symptoms improved        Acute blood loss anemia  Hgb postop to 6.9, preop value was 12.1. received 1u PRBCs intraop, 1u postop and another 2u 2/5.  - Hgb stable at 7.6 today from 7.4 yesterday  Hemoglobin stable at 8.1 today     Depression/Anxiety  - Continue PTA lexapro, xanax     Hyperlipidemia  - Continue PTA statin     GERD  - Continue PTA PPI     Hx ETOH abuse  Pt & family report abstinence since 12/2019.    I have discussed the following assessment and plan with Dr. Gomes who is in agreement with initial plan and will follow up with further consultation recommendations.    Blanca  Gulbranson, CNP  Spine and Brain Clinic  [facility]  [address] [place] [address]  [address]  [place] [address]    Tel [phone]  Pager [phone]      Pending Results   These results will be followed up by Dr. Gomes  Unresulted Labs Ordered in the Past 30 Days of this Admission     Date and Time Order Name Status Description    2/9/2020 1753 Blood culture Preliminary     2/9/2020 1753 Blood culture Preliminary           Code Status   Full Code    Primary Care Physician   Phani Jason    Physical Exam   Temp: 98.5  F (36.9  C) Temp src: Oral BP: (!) 145/76   Heart Rate: 66 Resp: 14 SpO2: 95 % O2 Device: None (Room air)    Vitals:    02/04/20 0600 02/07/20 0614 02/10/20 0500   Weight: 140 lb (63.5 kg) 145 lb (65.8 kg) 143 lb 11.8 oz (65.2 kg)     Vital Signs with Ranges  Temp:  [98  F (36.7  C)-98.7  F (37.1  C)] 98.5  F (36.9  C)  Heart Rate:  [66-80] 66  Resp:  [14-16] 14  BP: (109-154)/(61-85) 145/76  SpO2:  [95 %-96 %] 95 %  I/O last 3 completed shifts:  In: 1180 [P.O.:1180]  Out: -     Constitutional: Awake, alert, cooperative, no apparent distress, and appears stated age.  Eyes: Lids and lashes normal, pupils equal, round and reactive to light, extra ocular muscles intact  ENT: Normocephalic, without obvious abnormality, atraumatic  Respiratory: No increased work of breathing  Skin: No bruising or bleeding, normal skin color, texture, turgor, no redness, warmth, or swelling.  Incision with staples intact, minimal drainage, open to air.  Musculoskeletal: There is no redness, warmth, or swelling of the joints.  Full range of motion noted.  Motor strength is 5 out of 5 all extremities bilaterally.  Tone is normal.  Neurologic: Awake, alert, oriented to name, place and time.  Cranial nerves II-XII are grossly intact.  Motor is 5 out of 5 bilaterally.     Neuropsychiatric: Calm, normal eye contact, alert, normal affect, oriented to self, place, time and situation, memory for past and recent events  intact and thought process normal.       Time Spent on this Encounter   Blanca KENT NP, personally saw the patient today and spent less than or equal to 30 minutes discharging this patient.    Discharge Disposition   Discharged to home  Condition at discharge: Stable    Consultations This Hospital Stay   OCCUPATIONAL THERAPY ADULT IP CONSULT  PHYSICAL THERAPY ADULT IP CONSULT  ORTHOSIS SPINAL IP CONSULT  HOSPITALIST IP CONSULT    Discharge Orders      Reason for your hospital stay    S/p lumbar spine surgery and influenza     Activity    Your activity upon discharge: activity as tolerated     Reason for your hospital stay    Lumbar spine fusion     Follow-up and recommended labs and tests     Please follow up at [facility] in 2 weeks for staple/suture removal and in 6 weeks with lumbar xray prior.  Please call the clinic at [phone] to schedule your appointment.     Activity    Discharge instructions:  No lifting of more than 10 pounds, no bending, no twisting until follow up visit.  Wear brace when out of bed.    Ok to shampoo hair, shower or bathe, but, do not scrub or submerge incision under water until evaluated post-operatively in clinic.    Ok to walk as tolerated, avoid bed rest and prolonged sitting.    No contact sports until after follow up visit  No high impact activities such as; running/jogging, snowmobile or 4 [place] riding or any other recreational vehicles.    Do not take NSAIDS (ibuprofen, advil, aleve, naproxen, etc) for pain control.    Do NOT drive while taking narcotic pain medication.    Call the Sp[facility] Clinic at [phone] for increasing redness, swelling or pus draining from the incision, increased pain or any other questions and concerns.     Wound care and dressings    Keep your incision clean and dry at all times.  OK to remove dressing on postop day 2.  OK to shower on postop day 3 and allow water to run over incision, pat dry after  shower.  No bathing swimming or submerging in water.  Call immediately or come to ED if any drainage occurs, or you develop new pain, redness, or swelling.     Full Code     Diet    Follow this diet upon discharge: regular     Diet    Follow this diet upon discharge:3 Orders Placed This Encounter      Regular Diet Adult      Diet     Discharge Medications   Current Discharge Medication List      START taking these medications    Details   methocarbamol (ROBAXIN) 750 MG tablet Take 1 tablet (750 mg) by mouth every 6 hours as needed for muscle spasms  Qty: 40 tablet, Refills: 0    Associated Diagnoses: S/P lumbar fusion      oseltamivir (TAMIFLU) 75 MG capsule Take 1 capsule (75 mg) by mouth 2 times daily  Qty: 5 capsule, Refills: 0    Associated Diagnoses: Influenza A      oxyCODONE (ROXICODONE) 5 MG tablet Take 1-2 tablets (5-10 mg) by mouth every 3 hours as needed for moderate to severe pain  Qty: 40 tablet, Refills: 0    Associated Diagnoses: S/P lumbar fusion      senna-docusate (SENOKOT-S/PERICOLACE) 8.6-50 MG tablet Take 2 tablets by mouth 2 times daily  Qty: 60 tablet, Refills: 0    Associated Diagnoses: S/P lumbar fusion         CONTINUE these medications which have CHANGED    Details   polyethylene glycol (MIRALAX/GLYCOLAX) powder Take 17 g (1 capful) by mouth daily         CONTINUE these medications which have NOT CHANGED    Details   acetaminophen (TYLENOL) 500 MG tablet Take 1,000 mg by mouth 3 times daily as needed for mild pain (500MG X 2 = 1,000MG)      ALPRAZolam (XANAX) 0.5 MG tablet TAKE ONE TABLET BY MOUTH THREE TIMES A DAY AS NEEDED FOR ANXIETY -- (NEED TO BE SEEN IN CLINIC FOR FURTHER REFILLS)  Qty: 30 tablet, Refills: 0    Associated Diagnoses: Adjustment disorder with depressed mood; Anxiety      atorvastatin (LIPITOR) 20 MG tablet Take 1 tablet (20 mg) by mouth daily  Qty: 90 tablet, Refills: 1    Associated Diagnoses: Hyperlipidemia LDL goal <130      cholecalciferol (VITAMIN D3) 5000 units  (125 mcg) capsule Take 5,000 Units by mouth daily      diphenhydrAMINE (BENADRYL) 25 MG tablet Take 25 mg by mouth nightly as needed for itching or allergies      escitalopram (LEXAPRO) 20 MG tablet TAKE ONE TABLET BY MOUTH ONCE DAILY  Qty: 90 tablet, Refills: 1    Associated Diagnoses: Chronic pain syndrome; Adjustment disorder with depressed mood      Garlic 1000 MG CAPS Take 1,000 mg by mouth every morning      Lansoprazole (PREVACID PO) Take 40 mg by mouth every morning       MAGNESIUM PO Take 380 mg by mouth daily      Methylcobalamin (METHYL B-12 PO) Take 5,000 Units by mouth daily      prochlorperazine (COMPAZINE) 10 MG tablet TAKE ONE TABLET BY MOUTH EVERY 6 HOURS AS NEEDED (NAUSEA/VOMITING)  Qty: 30 tablet, Refills: 3    Associated Diagnoses: Encounter for antineoplastic chemotherapy      tamsulosin (FLOMAX) 0.4 MG capsule Take 2 capsules (0.8 mg) by mouth daily  Qty: 180 capsule, Refills: 1    Associated Diagnoses: Dysuria      Zinc 30 MG CAPS Take 30 mg by mouth daily         STOP taking these medications       oxyCODONE-acetaminophen (PERCOCET) 5-325 MG tablet Comments:   Reason for Stopping:             Allergies   Allergies   Allergen Reactions     No Known Drug Allergies

## 2020-02-12 NOTE — PLAN OF CARE
Discharge Planner PT   Patient plan for discharge: home today  Current status: Pt declined any mobility at this time as she was dressed in street clothes and waiting for discharge. Pt and  did report a concern about completing lorrie-care while avoiding twisting. Demonstrated how toilet tongs or equivalent could be used to minimize any risk for twisting. No other concerns.  can provide SBA for all mobility and Petra for stairs. No other questions. Pt able to recall 3/3 spine precautions.  Barriers to return to prior living situation: none  Recommendations for discharge: home with  supervision for level mobility with a FWW and Petra on step to enter  Rationale for recommendations: Goals partially met. Pt is safe to disch home with  assist from a mobility standpoint       Entered by: Hetal Reich 02/12/2020 1:58 PM         Physical Therapy Discharge Summary    Reason for therapy discharge:    Discharged to home.    Progress towards therapy goal(s). See goals on [facility] Plan [facility] health record for goal details.  Goals partially met.  Barriers to achieving goals:   discharge from facility.    Therapy recommendation(s):    Continue home exercise program. Anticipate pt will progress to independence with mobility without further skilled PT. Ambulation program for exercise

## 2020-02-12 NOTE — PROGRESS NOTES
[facility]    Medicine Progress Note - Hospitalist Service       Date of Admission:  2/4/2020  Assessment & Plan   Michaela Dorman is a 69 year old female with a past medical history of GERD, HLD, ETOH abuse in remission, depression, and lumbar spinal stenosis with radiculopathy who underwent a previously scheduled L2-S1 fusion by Dr. Gomes on 2/4/2020. Hospitalist service was asked to consult for postoperative comedical management.     Lumbar spinal stenosis s/p L2-S1 fusion on 2/4/2020  Surgery was performed using general anesthesia, EBL notably 1500.   - Postoperative monitoring, management per primary service including pain control, DVT prophylaxis  - added daily miralax. Pt hasn't had BM since admission. Takes miralax daily at home. Also added prn Senna.  - no BM with suppository  - adding 5mg bisacodyl and increasing miralax to BID  - pt is getting 2 tabs senna BID  No BM yet pink lady enema ordered one-time today  Patient complains of headache with positional change.  Headache much improved today   TITI drain was removed on 2/10/2020  After mag citrate she had good BMs   Influenza A viral illness;  She tested for positive for influenza A  Started on Tamiflu twice daily for 5-day course  Supportive treatment as needed including Tylenol and Robitussin-DM  Flu symptoms improved       Acute blood loss anemia  Hgb postop to 6.9, preop value was 12.1. received 1u PRBCs intraop, 1u postop and another 2u 2/5.  - Hgb stable at 7.6 today from 7.4 yesterday  Hemoglobin stable at 8.1 today     Depression/Anxiety  - Continue PTA lexapro, xanax     Hyperlipidemia  - Continue PTA statin     GERD  - Continue PTA PPI     Hx ETOH abuse  Pt & family report abstinence since 12/2019.    Diet: Regular Diet Adult  Diet  Diet    DVT Prophylaxis: Pneumatic Compression Devices  Quinones Catheter: not present    Disposition Plan   Expected discharge: home today   Entered: Val Khan MD 02/12/2020, 12:38 PM       The  patient's care was discussed with the Patient and bedside RN    Val Khan MD  Hospitalist Service  [facility]    ______________________________________________________________________    Interval History      Doing well. No new issues . Pain well controlled. Constipation improved     Data reviewed today: I reviewed all medications, new labs and imaging results over the last 24 hours. I personally reviewed no images or EKG's today.    Physical Exam   Vital Signs: Temp: 98.5  F (36.9  C) Temp src: Oral BP: 137/69   Heart Rate: 73 Resp: 14 SpO2: 97 % O2 Device: None (Room air)    Weight: 143 lbs 11.84 oz  GEN: well-developed, well-nourished, appears comfortable. Laying in bed   HEENT: NCAT, PERRL, EOM intact bilaterally, sclera clear, conjunctiva normal, nose & mouth patent, mucous membranes moist  CHEST: lungs CTA bilaterally, no increased work of breathing, no wheeze, rales, rhonchi  HEART: RRR, S1 & S2, no murmur  ABD: soft, nontender, nondistended, no guarding or rigidity, hypoactive BS in all 4 quadrants  BACK: surgical dressing in place with TITI drain x2 draining serosanguinous fluid  MSK: full AROM bilateral UE/LE, pedal & radial pulses 2+ bilaterally  SKIN: warm & dry without rash, no pedal edema    Data   Recent Labs   Lab 02/11/20  0743 02/10/20  0717 02/09/20  1830   WBC  --   --  5.6   HGB 8.1* 7.6* 7.4*   MCV  --   --  93   PLT  --   --  221     No results found for this or any previous visit (from the past 24 hour(s)).  Medications       atorvastatin  20 mg Oral Daily     escitalopram  20 mg Oral Daily     famotidine  20 mg Oral BID    Or     famotidine  20 mg Intravenous BID     oseltamivir  75 mg Oral BID     polyethylene glycol  17 g Oral BID     senna-docusate  1 tablet Oral BID    Or     senna-docusate  2 tablet Oral BID     sodium chloride (PF)  3 mL Intracatheter Q8H     tamsulosin  0.4 mg Oral At Bedtime

## 2020-02-12 NOTE — PLAN OF CARE
A and O x4. No N/T. Good strength, slight baseline weakness in LLE. Up with 1, belt, walker. Pain well controlled with Tylenol, Oxycodone, and robaxin. Good appetite on regular diet. Loose stool this morning, bowel activity slowing. BS positive, instruction given on following a maintenance program when home. Family present at time of discharge. Reviewed all discharge medications and instructions, including when to access medical care post op. Pt discharged home.

## 2020-02-12 NOTE — PLAN OF CARE
POD 8 from a L2-S1 fusion. A&O x4. CMS with slight BLE weakness. VSS. Back incision open to air. Stitches at drain insertion sites with erythema. Up with 1 GB/walker/brace. Pt had several BMs in evening/overnight. On regular diet. Getting prn tylenol, robaxin, and oxycodone for back pain. Compazine given x1 for nausea. Pt scoring green on the Aggression Stop Light Tool. Plan for discharge home today.

## 2020-02-13 ENCOUNTER — TELEPHONE (OUTPATIENT)
Dept: FAMILY MEDICINE | Facility: OTHER | Age: 70
End: 2020-02-13

## 2020-02-13 NOTE — TELEPHONE ENCOUNTER
ED / Discharge Outreach Protocol    Patient Contact    Attempt # 1    Was call answered?  No.  Left message on voicemail with information to call me back.    Larry Meadows, RN, BSN

## 2020-02-13 NOTE — TELEPHONE ENCOUNTER
Reason for follow up call: Michaela Dorman appeared on our list for being seen in and recenlty discharge from the Emergency Room/Hospital.    Chief Complaint   Patient presents with     Hospital F/U     Chief Complaint: Lumbar Radiculopathy, S/P Lumbar Fusion       Encounter routed for Clinic Triage RN to call for follow up      Next 5 appointments (look out 90 days)    Feb 20, 2020 11:00 AM CST  [facility] [address] [place] [id number]  [phone]   Feb 21, 2020 12:45 PM CST  [facility] [address] [place] [id number]  [phone]   Mar 20, 2020 10:00 AM CDT  Return Visit with Venkat Shipley PA-C  [facility] [address] [place] [phone]  [phone]   May 04, 2020 10:00 AM CDT  Return Visit with Venkat Shipley PA-C  [facility] [address] [place] [id number]  [phone]        Rita Garcia, MARY GRACEN, RN, PHN

## 2020-02-14 ENCOUNTER — MYC MEDICAL ADVICE (OUTPATIENT)
Dept: FAMILY MEDICINE | Facility: OTHER | Age: 70
End: 2020-02-14

## 2020-02-14 ENCOUNTER — TELEPHONE (OUTPATIENT)
Dept: FAMILY MEDICINE | Facility: OTHER | Age: 70
End: 2020-02-14

## 2020-02-14 DIAGNOSIS — Z97.8 STATUS POST INSERTION OF FOLEY CATHETER: ICD-10-CM

## 2020-02-14 DIAGNOSIS — N39.0 URINARY TRACT INFECTION WITHOUT HEMATURIA, SITE UNSPECIFIED: Primary | ICD-10-CM

## 2020-02-14 DIAGNOSIS — R30.0 DYSURIA: ICD-10-CM

## 2020-02-14 DIAGNOSIS — R30.0 DYSURIA: Primary | ICD-10-CM

## 2020-02-14 DIAGNOSIS — B37.0 THRUSH: Primary | ICD-10-CM

## 2020-02-14 LAB
ALBUMIN UR-MCNC: NEGATIVE MG/DL
APPEARANCE UR: CLEAR
BACTERIA #/AREA URNS HPF: ABNORMAL /HPF
BILIRUB UR QL STRIP: NEGATIVE
COLOR UR AUTO: ABNORMAL
GLUCOSE UR STRIP-MCNC: NEGATIVE MG/DL
HGB UR QL STRIP: NEGATIVE
KETONES UR STRIP-MCNC: NEGATIVE MG/DL
LEUKOCYTE ESTERASE UR QL STRIP: NEGATIVE
NITRATE UR QL: POSITIVE
PH UR STRIP: 7 PH (ref 5–7)
RBC #/AREA URNS AUTO: ABNORMAL /HPF
SOURCE: ABNORMAL
SP GR UR STRIP: 1.01 (ref 1–1.03)
UROBILINOGEN UR STRIP-ACNC: 0.2 EU/DL (ref 0.2–1)
WBC #/AREA URNS AUTO: ABNORMAL /HPF

## 2020-02-14 PROCEDURE — 81001 URINALYSIS AUTO W/SCOPE: CPT | Performed by: PHYSICIAN ASSISTANT

## 2020-02-14 PROCEDURE — 87086 URINE CULTURE/COLONY COUNT: CPT | Performed by: PHYSICIAN ASSISTANT

## 2020-02-14 RX ORDER — CIPROFLOXACIN 500 MG/1
500 TABLET, FILM COATED ORAL 2 TIMES DAILY
Qty: 20 TABLET | Refills: 0 | Status: SHIPPED | OUTPATIENT
Start: 2020-02-14 | End: 2020-03-16

## 2020-02-14 RX ORDER — NYSTATIN 100000/ML
500000 SUSPENSION, ORAL (FINAL DOSE FORM) ORAL 4 TIMES DAILY
Qty: 140 ML | Refills: 0 | Status: SHIPPED | OUTPATIENT
Start: 2020-02-14 | End: 2020-06-16

## 2020-02-14 NOTE — TELEPHONE ENCOUNTER
Spoke with patient.  She does not have a cath right now.  Feels the increase in frequency and burning with urination.  No blood seen.  Advised that we need to get a UA.  States she is unable to get to the clinic, but will have someone get supplies and bring them in for her.       Provider placed orders.    Larry Meadows, RN, BSN

## 2020-02-14 NOTE — TELEPHONE ENCOUNTER
Reason for Call:  Other call back    Detailed comments: Patient was calling wondering about a sample that she dropped off at the lab and is asking to be contacted with test results. Please advise.    Phone Number Patient can be reached at: Home number on file [phone] (home)    Best Time: anytime    Can we leave a detailed message on this number? YES    Call taken on 2/14/2020 at 3:53 PM by Miranda Seipiel Vaccari

## 2020-02-14 NOTE — TELEPHONE ENCOUNTER
I replied to her labs via my chart message to her.  Prescription medication has been sent to the pharmacy for her.  Electronically signed:    Phani Jason PA-C

## 2020-02-14 NOTE — TELEPHONE ENCOUNTER
ED / Discharge Outreach Protocol    Patient Contact    Attempt # 2    Was call answered?  No.  Left message on voicemail with information to call me back.    Larry Meadows, RN, BSN      Post Discharge Medication Reconciliation Status: discharge medications reconciled, continue medications without change.

## 2020-02-15 LAB
BACTERIA SPEC CULT: NO GROWTH
BACTERIA SPEC CULT: NO GROWTH
BACTERIA SPEC CULT: NORMAL
Lab: NORMAL
SPECIMEN SOURCE: NORMAL

## 2020-02-17 ENCOUNTER — MYC MEDICAL ADVICE (OUTPATIENT)
Dept: NEUROSURGERY | Facility: OTHER | Age: 70
End: 2020-02-17

## 2020-02-18 ENCOUNTER — MYC MEDICAL ADVICE (OUTPATIENT)
Dept: FAMILY MEDICINE | Facility: OTHER | Age: 70
End: 2020-02-18

## 2020-02-18 DIAGNOSIS — Z98.1 S/P LUMBAR FUSION: ICD-10-CM

## 2020-02-18 RX ORDER — OXYCODONE HYDROCHLORIDE 5 MG/1
5-10 TABLET ORAL
Qty: 40 TABLET | Refills: 0 | Status: SHIPPED | OUTPATIENT
Start: 2020-02-18 | End: 2020-03-03

## 2020-02-18 NOTE — TELEPHONE ENCOUNTER
Spoke with patient went over questions regarding walker and corset. Patient advised to wear corset while up and moving, walker should be used if she is having any issues with LE weakness or balance. Patient states she is feeling steady, no leg weakness or pain.  PCP filled pain medication, informed her that if she needs further refills they can come through us during this post op period. Patient verbalized understanding and will contact us with any other questions or concerns.

## 2020-02-18 NOTE — TELEPHONE ENCOUNTER
.Oxycodone      Last Written Prescription Date:  02/12/2020  Last Fill Quantity: 40,   # refills: 0  Last Office Visit: 01/23/20  Future Office visit:    Next 5 appointments (look out 90 days)    Feb 21, 2020 12:45 PM CST  Return Visit with  Neuro Nurse  [address] [address]  [phone]   Mar 20, 2020 10:00 AM CDT  Return Visit with Venkat Shipley PA-C  [facility] ([address] [address]  [phone]   May 04, 2020 10:00 AM CDT  Return Visit with Venkat Shipley PA-C  [facility] ([address] [address]  [phone]           Routing refill request to provider for review/approval because:  Drug not on the [facility], [facility] or [facility] refill protocol or controlled substance

## 2020-02-18 NOTE — TELEPHONE ENCOUNTER
Spoke to the [facility] pharmacy they report this prescription was sent to the patients room while she was at the hospital.   .Rocio PIEDRA)

## 2020-02-21 ENCOUNTER — OFFICE VISIT (OUTPATIENT)
Dept: NEUROSURGERY | Facility: CLINIC | Age: 70
End: 2020-02-21
Payer: COMMERCIAL

## 2020-02-21 VITALS
HEIGHT: 67 IN | DIASTOLIC BLOOD PRESSURE: 60 MMHG | HEART RATE: 94 BPM | TEMPERATURE: 97.7 F | SYSTOLIC BLOOD PRESSURE: 122 MMHG | BODY MASS INDEX: 21.72 KG/M2 | WEIGHT: 138.4 LBS | OXYGEN SATURATION: 100 %

## 2020-02-21 DIAGNOSIS — Z98.1 S/P LUMBAR SPINAL FUSION: Primary | ICD-10-CM

## 2020-02-21 PROCEDURE — 99024 POSTOP FOLLOW-UP VISIT: CPT | Performed by: PHYSICIAN ASSISTANT

## 2020-02-21 ASSESSMENT — MIFFLIN-ST. JEOR: SCORE: 1180.56

## 2020-02-21 NOTE — LETTER
2/21/2020         RE: Michaela Dorman  [address] [facility] [address]        Dear Colleague,    Thank you for referring your patient, Michaela Dorman, to the [facility]. Please see a copy of my visit note below.    Suture/Staple removal visit note:  S:  Patient has minimal pain,  and has no current concerns or questions regarding post-surgical plan of care.  O:  Staples and drain site sutures removed by myself, wound is healing well without erythema, fluctuation, or induration.  A: Michaela returned to clinic post-op for staple and suture removal.  Patient tolerated procedure without incident.  P:  Continue with regularly scheduled f/u.     - Keep your incision clean and dry at all times. No bathing swimming or submerging in water.  Call immediately or come to ED if any drainage occurs, or you develop new pain, redness, or swelling.    - Return in 4 weeks for follow up appointment and xray    - Brace on when out of bed. No lifting greater than 10-15 pounds. No bending, twisting, or overhead reaching.     Venkat Shipley PA-C  [facility]  [address] [address]    Tel [phone]  Pager [phone]        Again, thank you for allowing me to participate in the care of your patient.        Sincerely,        Venkat Shipley PA-C

## 2020-02-21 NOTE — PROGRESS NOTES
Suture/Staple removal visit note:  S:  Patient has minimal pain,  and has no current concerns or questions regarding post-surgical plan of care.  O:  Staples and drain site sutures removed by myself, wound is healing well without erythema, fluctuation, or induration.  A: Michaela returned to clinic post-op for staple and suture removal.  Patient tolerated procedure without incident.  P:  Continue with regularly scheduled f/u.     - Keep your incision clean and dry at all times. No bathing swimming or submerging in water.  Call immediately or come to ED if any drainage occurs, or you develop new pain, redness, or swelling.    - Return in 4 weeks for follow up appointment and xray    - Brace on when out of bed. No lifting greater than 10-15 pounds. No bending, twisting, or overhead reaching.     JOAN CaroC  [facility]  [address] [address]    Tel [phone]  Pager [phone]

## 2020-02-21 NOTE — NURSING NOTE
"Michaela Dorman is a 69 year old female who presents for:  Chief Complaint   Patient presents with     Suture Removal     Wound Check        Initial Vitals:  /60   Pulse 94   Temp 97.7  F (36.5  C) (Temporal)   Ht 5' 7.01\" (1.702 m)   Wt 138 lb 6.4 oz (62.8 kg)   SpO2 100%   BMI 21.67 kg/m   Estimated body mass index is 21.67 kg/m  as calculated from the following:    Height as of this encounter: 5' 7.01\" (1.702 m).    Weight as of this encounter: 138 lb 6.4 oz (62.8 kg).. Body surface area is 1.72 meters squared. BP completed using cuff size: regular  Data Unavailable    Nursing Comments: Staple removal completed by Venkat Gonzalez    "

## 2020-02-24 ENCOUNTER — MYC MEDICAL ADVICE (OUTPATIENT)
Dept: PODIATRY | Facility: OTHER | Age: 70
End: 2020-02-24

## 2020-02-28 NOTE — TELEPHONE ENCOUNTER
Patient read message closing encounter.    CHARLES: Patient reports no palpitations or symptoms since being in ED. Results discussed with patient, printed copies given. Patient agreeable and verbalizes understanding of plan of care, f/u, and return precautions.

## 2020-03-02 ENCOUNTER — TELEPHONE (OUTPATIENT)
Dept: NEUROSURGERY | Facility: CLINIC | Age: 70
End: 2020-03-02

## 2020-03-02 DIAGNOSIS — F43.21 ADJUSTMENT DISORDER WITH DEPRESSED MOOD: ICD-10-CM

## 2020-03-02 DIAGNOSIS — Z98.1 S/P LUMBAR FUSION: ICD-10-CM

## 2020-03-02 DIAGNOSIS — F41.9 ANXIETY: ICD-10-CM

## 2020-03-02 RX ORDER — METHOCARBAMOL 750 MG/1
750 TABLET, FILM COATED ORAL EVERY 6 HOURS PRN
Qty: 40 TABLET | Refills: 1 | Status: SHIPPED | OUTPATIENT
Start: 2020-03-02 | End: 2020-06-16

## 2020-03-02 NOTE — TELEPHONE ENCOUNTER
Prescription Refill Request- received fax from [facility]     Medication: Robaxin 750mg    Surgical procedure/date: L2-S1 TLIF on 2/4/20 with Dr. Gomes     Current regimen/number of tabs per day: 1 tab q 6 hours PRN for  Muscle spasms    Last refill: 2/12/20     location: [facility]    Will forward request to care team for approval.

## 2020-03-03 ENCOUNTER — MYC MEDICAL ADVICE (OUTPATIENT)
Dept: FAMILY MEDICINE | Facility: OTHER | Age: 70
End: 2020-03-03

## 2020-03-03 DIAGNOSIS — Z98.1 S/P LUMBAR FUSION: ICD-10-CM

## 2020-03-03 RX ORDER — OXYCODONE HYDROCHLORIDE 5 MG/1
5-10 TABLET ORAL
Qty: 40 TABLET | Refills: 0 | Status: SHIPPED | OUTPATIENT
Start: 2020-03-03 | End: 2020-04-10

## 2020-03-03 RX ORDER — ALPRAZOLAM 0.5 MG
0.5 TABLET ORAL
Qty: 30 TABLET | Refills: 0 | Status: SHIPPED | OUTPATIENT
Start: 2020-03-03 | End: 2020-03-22

## 2020-03-03 NOTE — TELEPHONE ENCOUNTER
Pending Prescriptions:                       Disp   Refills    ALPRAZolam (XANAX) 0.5 MG tablet [Pharmacy*30 tab*0        Sig: TAKE ONE TABLET BY MOUTH THREE TIMES A DAY AS NEEDED           FOR ANXIETY -- (NEED TO BE SEEN IN CLINIC FOR           FURTHER REFILLS)    Routing refill request to provider for review/approval because:  Drug not on the G refill protocol     Rita Garcia, MARY GRACEN, RN, PHN

## 2020-03-13 DIAGNOSIS — Z98.1 S/P LUMBAR FUSION: Primary | ICD-10-CM

## 2020-03-15 ENCOUNTER — HEALTH MAINTENANCE LETTER (OUTPATIENT)
Age: 70
End: 2020-03-15

## 2020-03-16 ENCOUNTER — OFFICE VISIT (OUTPATIENT)
Dept: NEUROSURGERY | Facility: CLINIC | Age: 70
End: 2020-03-16
Payer: COMMERCIAL

## 2020-03-16 ENCOUNTER — ANCILLARY PROCEDURE (OUTPATIENT)
Dept: GENERAL RADIOLOGY | Facility: CLINIC | Age: 70
End: 2020-03-16
Attending: PHYSICIAN ASSISTANT
Payer: COMMERCIAL

## 2020-03-16 VITALS
HEART RATE: 90 BPM | DIASTOLIC BLOOD PRESSURE: 54 MMHG | SYSTOLIC BLOOD PRESSURE: 104 MMHG | WEIGHT: 142.6 LBS | TEMPERATURE: 98.4 F | HEIGHT: 67 IN | BODY MASS INDEX: 22.38 KG/M2 | OXYGEN SATURATION: 96 %

## 2020-03-16 DIAGNOSIS — Z98.1 S/P LUMBAR SPINAL FUSION: Primary | ICD-10-CM

## 2020-03-16 DIAGNOSIS — Z98.1 S/P LUMBAR FUSION: ICD-10-CM

## 2020-03-16 PROCEDURE — 99024 POSTOP FOLLOW-UP VISIT: CPT | Performed by: PHYSICIAN ASSISTANT

## 2020-03-16 PROCEDURE — 72100 X-RAY EXAM L-S SPINE 2/3 VWS: CPT | Mod: TC

## 2020-03-16 ASSESSMENT — PAIN SCALES - GENERAL: PAINLEVEL: MILD PAIN (3)

## 2020-03-16 ASSESSMENT — MIFFLIN-ST. JEOR: SCORE: 1199.61

## 2020-03-16 NOTE — PROGRESS NOTES
[facility]  Neurosurgery followup:    HPI: 6 weeks s/p L2-S1 TLIF. She is doing well, still with some back pain.  She has been walking up to 2 hours/day, and 30-minute increments.  No leg pain.  Exam:  Constitutional:  Alert, well nourished, NAD.  HEENT: Normocephalic, atraumatic.   Pulm:  Without shortness of breath   CV:  No pitting edema of BLE.      Neurological:  Awake  Alert  Oriented x 3  Motor exam:        IP Q DF PF EHL  R   5  5   5   5    5  L   5  5   5   5    5     Reflexes are 2+ in the patellar and Achilles. There is no clonus. Downgoing Babinski.      Able to spontaneously move L/E bilaterally  Sensation intact throughout all L/E dermatomes     Incision: Well-healed  Imaging: AP and lateral views show the lumbar instrumentation to be in stable position, with no concern for complication.    A/P:  6 weeks out from L2 S1 TLIF.  She continues to do well.  I counseled her that she is perhaps spending a little bit too much time walking, as she does develop a lot of soreness afterwards.  I encouraged shorter, more frequent walks.  She understands her x-rays look stable.  We will see her back at her 3-month follow-up.      Venkat PRESCOTT [facility]  [address] [address]    Tel [phone]  Pager [phone]

## 2020-03-16 NOTE — LETTER
3/16/2020         RE: Michaela Dorman  [address]  [facility] [address]        Dear Colleague,    Thank you for referring your patient, Michaela Dorman, to the [facility]. Please see a copy of my visit note below.    [facility]  Neurosurgery followup:    HPI: 6 weeks s/p L2-S1 TLIF. She is doing well, still with some back pain.  She has been walking up to 2 hours/day, and 30-minute increments.  No leg pain.  Exam:  Constitutional:  Alert, well nourished, NAD.  HEENT: Normocephalic, atraumatic.   Pulm:  Without shortness of breath   CV:  No pitting edema of BLE.      Neurological:  Awake  Alert  Oriented x 3  Motor exam:        IP Q DF PF EHL  R   5  5   5   5    5  L   5  5   5   5    5     Reflexes are 2+ in the patellar and Achilles. There is no clonus. Downgoing Babinski.      Able to spontaneously move L/E bilaterally  Sensation intact throughout all L/E dermatomes     Incision: Well-healed  Imaging: AP and lateral views show the lumbar instrumentation to be in stable position, with no concern for complication.    A/P:  6 weeks out from L2 S1 TLIF.  She continues to do well.  I counseled her that she is perhaps spending a little bit too much time walking, as she does develop a lot of soreness afterwards.  I encouraged shorter, more frequent walks.  She understands her x-rays look stable.  We will see her back at her 3-month follow-up.      Venkat Shipley PA-C  [facility]  [address] [address]    Tel [phone]  Pager [phone]      Again, thank you for allowing me to participate in the care of your patient.        Sincerely,        Venkat Shipley PA-C

## 2020-03-21 DIAGNOSIS — F41.9 ANXIETY: ICD-10-CM

## 2020-03-21 DIAGNOSIS — F43.21 ADJUSTMENT DISORDER WITH DEPRESSED MOOD: ICD-10-CM

## 2020-03-21 NOTE — MR AVS SNAPSHOT
After Visit Summary   3/10/2017    Michaela Dorman    MRN: [id number]           Patient Information     Date Of Birth          1950        Visit Information        Provider Department      3/10/2017 1:30 PM NL INFUSION CHAIR 2 [facility]        Today's Diagnoses     Malignant neoplasm of upper-outer quadrant of left female breast (H)    -  1       Follow-ups after your visit        Follow-up notes from your care team     Return in 3 weeks (on 3/28/2017).      Your next 10 appointments already scheduled     Mar 27, 2017   Procedure with Damien Pollard MD   [facility] ([facility])    [address] [place] [address]   [phone]            Mar 28, 2017 11:30 AM CDT   Level 2 with NL INFUSION CHAIR 5   [facility] ([facility])    [address] [place] [address]   [phone]            Apr 11, 2017  3:00 PM CDT   [facility] Visit with Syeda Ferguson MD   [facility]    [address] [place] [address]   [phone]              Who to contact     If you have questions or need follow up information about today's clinic visit or your schedule please contact [facility] directly at [phone].  Normal or non-critical lab and imaging results will be communicated to you by MyChart, letter or phone within 4 business days after the clinic has received the results. If you do not hear from us within 7 days, please contact the clinic through MyChart or phone. If you have a critical or abnormal lab result, we will notify you by phone as soon as possible.  Submit refill requests through [employer] or call your pharmacy and they will forward the refill request to us. Please allow 3 business days for your refill to be completed.          Additional Information About Your Visit        MyChart  [facility]  History & Physical    Drake Shepard Patient Status:      3/20/2020 MRN [id number]   [facility] 2SW-N Attending Toi Fernandez, DO   Hosp Day # 0 PCP No primary care provider on file.      HPI:  Boy Raeford Lesch is a(n) Weigh Information     [employer] gives you secure access to your electronic health record. If you see a primary care provider, you can also send messages to your care team and make appointments. If you have questions, please call your primary care clinic.  If you do not have a primary care provider, please call [phone] and they will assist you.        Care EveryWhere ID     This is your Care EveryWhere ID. This could be used by other organizations to access your [place] [facility] medical records  [id number]         Blood Pressure from Last 3 Encounters:   03/07/17 134/68   02/28/17 130/76   02/14/17 146/82    Weight from Last 3 Encounters:   03/07/17 63.8 kg (140 lb 9.6 oz)   02/28/17 63.2 kg (139 lb 4.8 oz)   02/16/17 64.3 kg (141 lb 12.8 oz)              Today, you had the following     No orders found for display       Primary Care Provider Office Phone # Fax #    Phani Tapia PA-C [phone]       [place] [address] [facility] [address]        Thank you!     Thank you for choosing [facility]  for your care. Our goal is always to provide you with excellent care. Hearing back from our patients is one way we can continue to improve our services. Please take a few minutes to complete the written survey that you may receive in the mail after your visit with us. Thank you!             Your Updated Medication List - Protect others around you: Learn how to safely use, store and throw away your medicines at www.disposemymeds.org.          This list is accurate as of: 3/10/17  3:20 PM.  Always use your most recent med list.                   Brand Name Dispense Instructions for use    * FLUoxetine 20 MG capsule    PROzac    270 capsule    TAKE THREE CAPSULES BY MOUTH EVERY DAY TITRATE AS DIRECTED       * FLUoxetine 20 MG capsule    PROzac    270 capsule    TAKE THREE CAPSULES BY MOUTH EVERY DAY TITRATE AS DIRECTED       hydrochlorothiazide 25 MG tablet    HYDRODIURIL    90  dimple  NEURO:+grasp,+suck,+amaris,good tone, no focal deficits        Assessment:  Infant is a Gestational Age: 37w0d maleborn via Normal spontaneous vaginal delivery      Plan:  Routine  nursery care.   BM feeds ad tenzin  Monitor jaundice, bilirubin le tablet    TAKE ONE TABLET BY MOUTH ONCE DAILY       HYDROcodone-acetaminophen 5-325 MG per tablet    NORCO    18 tablet    Take 1 tablet by mouth every 4 hours as needed for pain Maximum 8 tablet(s) per day       LORazepam 0.5 MG tablet    ATIVAN    30 tablet    Take 1 tablet (0.5 mg) by mouth every 4 hours as needed (Anxiety, Nausea/Vomiting or Sleep)       * oxyCODONE-acetaminophen 5-325 MG per tablet    PERCOCET    30 tablet    Take 1-2 tablets by mouth every 4 hours as needed for pain (moderate to severe)       * oxyCODONE-acetaminophen 5-325 MG per tablet    PERCOCET    20 tablet    Take 1 tablet by mouth every 4 hours as needed for pain       priLOSEC 20 MG CR capsule   Generic drug:  omeprazole      Take by mouth daily       prochlorperazine 10 MG tablet    COMPAZINE    30 tablet    Take 1 tablet (10 mg) by mouth every 6 hours as needed (Nausea/Vomiting)       simvastatin 20 MG tablet    ZOCOR    90 tablet    Take 1 tablet (20 mg) by mouth At Bedtime       TYLENOL EX ST ARTHRITIS PAIN 500 MG tablet   Generic drug:  acetaminophen      Take 500-1,000 mg by mouth every 6 hours as needed for mild pain       venlafaxine 37.5 MG 24 hr capsule    EFFEXOR XR    30 capsule    Take 1 capsule (37.5 mg) by mouth daily       * Notice:  This list has 4 medication(s) that are the same as other medications prescribed for you. Read the directions carefully, and ask your doctor or other care provider to review them with you.

## 2020-03-22 RX ORDER — ALPRAZOLAM 0.5 MG
TABLET ORAL
Qty: 30 TABLET | Refills: 0 | Status: SHIPPED | OUTPATIENT
Start: 2020-03-22 | End: 2020-06-22

## 2020-04-06 ENCOUNTER — MYC MEDICAL ADVICE (OUTPATIENT)
Dept: FAMILY MEDICINE | Facility: OTHER | Age: 70
End: 2020-04-06

## 2020-04-10 ENCOUNTER — MYC MEDICAL ADVICE (OUTPATIENT)
Dept: FAMILY MEDICINE | Facility: OTHER | Age: 70
End: 2020-04-10

## 2020-04-10 DIAGNOSIS — Z98.1 S/P LUMBAR FUSION: ICD-10-CM

## 2020-04-10 RX ORDER — OXYCODONE AND ACETAMINOPHEN 5; 325 MG/1; MG/1
TABLET ORAL
COMMUNITY
Start: 2020-01-23 | End: 2020-08-07

## 2020-04-10 RX ORDER — OXYCODONE HYDROCHLORIDE 5 MG/1
5-10 TABLET ORAL
Qty: 40 TABLET | Refills: 0 | Status: SHIPPED | OUTPATIENT
Start: 2020-04-10 | End: 2020-06-16

## 2020-04-12 DIAGNOSIS — R30.0 DYSURIA: ICD-10-CM

## 2020-04-13 RX ORDER — TAMSULOSIN HYDROCHLORIDE 0.4 MG/1
0.4 CAPSULE ORAL AT BEDTIME
Qty: 90 CAPSULE | Refills: 1 | Status: SHIPPED | OUTPATIENT
Start: 2020-04-13 | End: 2020-11-02

## 2020-05-04 ENCOUNTER — VIRTUAL VISIT (OUTPATIENT)
Dept: NEUROSURGERY | Facility: CLINIC | Age: 70
End: 2020-05-04
Attending: PHYSICIAN ASSISTANT
Payer: MEDICARE

## 2020-05-04 DIAGNOSIS — Z98.1 S/P LUMBAR FUSION: Primary | ICD-10-CM

## 2020-05-04 PROCEDURE — 99024 POSTOP FOLLOW-UP VISIT: CPT | Performed by: PHYSICIAN ASSISTANT

## 2020-05-04 NOTE — PATIENT INSTRUCTIONS
May increase lifting to 30 pounds and gradually increase  Follow up in 3 months with xray prior  Contact clinic with questions/concerns at [phone]

## 2020-05-04 NOTE — PROGRESS NOTES
"Michaela Dorman is a 69 year old female who is being evaluated via a billable telephone visit.      The patient has been notified of following:     \"This telephone visit will be conducted via a call between you and your physician/provider. We have found that certain health care needs can be provided without the need for a physical exam.  This service lets us provide the care you need with a short phone conversation.  If a prescription is necessary we can send it directly to your pharmacy.  If lab work is needed we can place an order for that and you can then stop by our lab to have the test done at a later time.    Telephone visits are billed at different rates depending on your insurance coverage. During this emergency period, for some insurers they may be billed the same as an in-person visit.  Please reach out to your insurance provider with any questions.    If during the course of the call the physician/provider feels a telephone visit is not appropriate, you will not be charged for this service.\"    Patient has given verbal consent for Telephone visit?  Yes    What phone number would you like to be contacted at? [phone]     How would you like to obtain your AVS? MyChart         3 months s/p L2-S1 TLIF. Recovering well with resolution of leg pain. Has mild localized back stiffness, worst with increased activity. Has been ambulating a lot, which has gone well.     No imaging completed today.     Plan:  May increase lifting to 30 pounds and gradually increase  Follow up in 3 months with xray prior  Contact clinic with questions/concerns at [phone]        Cathy Roche PA-C        "

## 2020-05-13 ENCOUNTER — MYC MEDICAL ADVICE (OUTPATIENT)
Dept: FAMILY MEDICINE | Facility: OTHER | Age: 70
End: 2020-05-13

## 2020-05-13 DIAGNOSIS — M48.061 FORAMINAL STENOSIS OF LUMBAR REGION: ICD-10-CM

## 2020-05-13 DIAGNOSIS — G89.4 CHRONIC PAIN SYNDROME: Primary | ICD-10-CM

## 2020-05-13 DIAGNOSIS — M54.16 LUMBAR RADICULOPATHY: ICD-10-CM

## 2020-05-13 DIAGNOSIS — M51.369 DDD (DEGENERATIVE DISC DISEASE), LUMBAR: ICD-10-CM

## 2020-05-13 RX ORDER — CELECOXIB 200 MG/1
200 CAPSULE ORAL DAILY
Qty: 90 CAPSULE | Refills: 0 | Status: SHIPPED | OUTPATIENT
Start: 2020-05-13 | End: 2020-06-16

## 2020-05-14 ENCOUNTER — NURSE TRIAGE (OUTPATIENT)
Dept: NURSING | Facility: CLINIC | Age: 70
End: 2020-05-14

## 2020-05-14 ENCOUNTER — MYC MEDICAL ADVICE (OUTPATIENT)
Dept: FAMILY MEDICINE | Facility: OTHER | Age: 70
End: 2020-05-14

## 2020-05-14 ENCOUNTER — VIRTUAL VISIT (OUTPATIENT)
Dept: URGENT CARE | Facility: CLINIC | Age: 70
End: 2020-05-14
Payer: COMMERCIAL

## 2020-05-14 DIAGNOSIS — Z20.822 SUSPECTED COVID-19 VIRUS INFECTION: Primary | ICD-10-CM

## 2020-05-14 PROCEDURE — 99214 OFFICE O/P EST MOD 30 MIN: CPT | Mod: 95 | Performed by: STUDENT IN AN ORGANIZED HEALTH CARE EDUCATION/TRAINING PROGRAM

## 2020-05-14 NOTE — TELEPHONE ENCOUNTER
Michaela was told to go to [facility] and cannot get system to work.  Michaela was exposed to covid and now is having some symptoms.  Yesterday had the chills, exposed 10 days ago and today is having body aches and sore throat.  Now  Temp of 109.9 (orally).  Denies shortness of breath and denies cough.    Fever started today.    COVID 19 Nurse Triage Plan/Patient Instructions    Please be aware that novel coronavirus (COVID-19) may be circulating in the community. If you develop symptoms such as fever, cough, or SOB or if you have concerns about the presence of another infection including coronavirus (COVID-19), please contact your health care provider or visit [email or web address].     Disposition/Instructions    Patient to have an Urgent Care Telephone Visit with a provider. Follow System Ambulatory Workflow for COVID 19.     Urgent Care Telephone Visits are available between the hours of 8 am to 9 pm. Staff will assist patent in scheduling an appointment for this Urgent Care Telephone Visit.     Call Back If: Your symptoms worsen before you are able to complete your Urgent Care Telephone Visit with a provider.    Thank you for limiting contact with others, wearing a simple mask to cover your cough, practice good hand hygiene habits and accessing our virtual services where possible to limit the spread of this virus.    For more information about COVID19 and options for caring for yourself at home, please visit the CDC website at https://www.cdc.gov/coronavirus/2019-ncov/about/steps-when-sick.html  For more options for care at [facility], please visit our website at [email or web address]    For more information, please use the [place] Department of Health COVID-19 Website: https://www.health.state.[place]/diseases/coronavirus/index.html  [place] Department of Health ([facility]) COVID-19 Hotlines (Interpreters available):      Health questions: Phone Number: [phone] or [phone] and  Hours: 7 a.m. to 7 p.m.    Schools and  questions: Phone Number: [phone] or [phone] and Hours 7 a.m. to 7 p.m.                      Reason for Disposition    COVID-19 Testing, questions about    Additional Information    Negative: SEVERE difficulty breathing (e.g., struggling for each breath, speaks in single words)    Negative: Difficult to awaken or acting confused (e.g., disoriented, slurred speech)    Negative: Bluish (or gray) lips or face now    Negative: Shock suspected (e.g., cold/pale/clammy skin, too weak to stand, low BP, rapid pulse)    Negative: Sounds like a life-threatening emergency to the triager    Negative: SEVERE or constant chest pain (Exception: mild central chest pain, present only when coughing)    Negative: MODERATE difficulty breathing (e.g., speaks in phrases, SOB even at rest, pulse 100-120)    Negative: Patient sounds very sick or weak to the triager    Negative: MILD difficulty breathing (e.g., minimal/no SOB at rest, SOB with walking, pulse <100)    Negative: Chest pain    Negative: Fever > 103 F (39.4 C)    Negative: [1] Fever > 101 F (38.3 C) AND [2] age > 60    Negative: [1] Fever > 100.0 F (37.8 C) AND [2] bedridden (e.g., nursing home patient, CVA, chronic illness, recovering from surgery)    Negative: HIGH RISK patient (e.g., age > 64 years, diabetes, heart or lung disease, weak immune system)    Negative: Fever present > 3 days (72 hours)    Negative: [1] Fever returns after gone for over 24 hours AND [2] symptoms worse or not improved    Negative: [1] Continuous (nonstop) coughing interferes with work or school AND [2] no improvement using cough treatment per protocol    Negative: Cough present > 3 weeks    Negative: [1] COVID-19 infection diagnosed or suspected AND [2] mild symptoms (fever, cough) AND [3] no trouble breathing or other complications    Negative: COVID-19 Home Isolation, questions about    Negative: COVID-19 Prevention and Healthy Living,  questions about    Protocols used: CORONAVIRUS (COVID-19) DIAGNOSED OR [id number] 4.22.20

## 2020-05-14 NOTE — PROGRESS NOTES
"The patient has been notified of following:     \"This telephone visit will be conducted via a call between you and your physician/provider. We have found that certain health care needs can be provided without the need for a physical exam.  This service lets us provide the care you need with a short phone conversation.  If a prescription is necessary we can send it directly to your pharmacy.  If lab work is needed we can place an order for that and you can then stop by our lab to have the test done at a later time.    If during the course of the call the physician/provider feels a telephone visit is not appropriate, you will not be charged for this service.\"     Subjective     CC: Michaela Dorman  is a 69 year old female who presents to clinic today for the following health issues:   Chief Complaint   Patient presents with     Generalized Body Aches        History:  10 days ago visit neighbor who has since tested positive for COVID-19 (2 days later).   Yesterday developed chills, sore throat, profound myalgias, headache and fever 100.9F. denies runny nose.     Eating and drinking normally, denies shortness of breath.    In the 14 days before your symptoms started, have you had close contact with a COVID-19 (Coronavirus) patient? Yes     In the 14 days before your symptoms started, have you traveled internationally or to a state with high rates of COVID19? No    Do you have a fever? Yes, I have a fever (100.4 to 101.4)    Are you having difficulty breathing? No    Do you have a cough? No    Have you ever been diagnosed with asthma, bronchitis, or lung disease? No    Breast cancer s/p treatment (chemo/rad/surgery) x3 years.     Do you smoke? No     Have you ever smoked? I smoked in the past, but quit 40 years ago    Are there people you know with similar symptoms?  (65 + COPD) asymptomatic.     Have you recently been hospitalized? 2/4/2020 was hospitalized for influenza. No ICU or intubation.       Patient " Active Problem List   Diagnosis     Enthesopathy of hip region     Family history of stroke (cerebrovascular)     Trochanteric bursitis     Advanced directives, counseling/discussion     Health Care Home     Hypertension goal BP (blood pressure) < 140/90     Baker's cyst of knee     Patella-femoral syndrome     Adjustment disorder with depressed mood     Essential hypertension     Malignant neoplasm of upper-outer quadrant of left female breast (H)     Encounter for antineoplastic chemotherapy     S/P bilateral mastectomy     Anxiety     Hyperlipidemia LDL goal <130     S/P reverse total shoulder arthroplasty, right     Prophylactic antibiotic for dental procedure indicated due to prior joint replacement     Chronic pain syndrome     Lumbar radiculopathy     Foraminal stenosis of lumbar region     Central stenosis of spinal canal     DDD (degenerative disc disease), lumbar     CKD (chronic kidney disease) stage 3, GFR 30-59 ml/min (H)     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)     Magallanes's neuroma of right foot     Bilateral impacted cerumen     S/P lumbar fusion     Past Surgical History:   Procedure Laterality Date     ARTHROPLASTY SHOULDER Right 11/17/2015     ARTHROSCOPY KNEE  6/7/2012    Procedure:ARTHROSCOPY KNEE; right knee arthroscopy with partial lateral menisectomy; Surgeon:DAMIÁN MCALLISTER; Location:[facility]     C LIGATE FALLOPIAN TUBE       C NONSPECIFIC PROCEDURE  1956    ankle fracture surgery     COLONOSCOPY N/A 12/22/2017    Procedure: COLONOSCOPY;  colonoscopy;  Surgeon: Chuck Chand MD;  Location: [facility]     EXCISE MASS AXILLA Left 9/16/2016    Procedure: EXCISE MASS AXILLA;  Surgeon: Keyon Blanco MD;  Location: [facility]     HC REMV CATARACT EXTRACAP,INSERT LENS, W/O ECP  6/25/2009    Right eye     INJECT EPIDURAL LUMBAR N/A 4/11/2019    Procedure: interlaminar epidual steroid injection lumbar 4-5;  Surgeon: Oskar Salvador MD;  Location: [facility]     INJECT  EPIDURAL LUMBAR Bilateral 2019    Procedure: lumbar 4-5 epidural interlaminar steroid injection;  Surgeon: Oskar Salvador MD;  Location: [facility]     INSERT PORT VASCULAR ACCESS Right 10/7/2016    Procedure: INSERT PORT VASCULAR ACCESS;  Surgeon: Keyon Blanco MD;  [facility]     MASTECTOMY SIMPLE BILATERAL Bilateral 2017    Procedure: MASTECTOMY SIMPLE BILATERAL;  Surgeon: Keyon Blanco MD;  [facility]     OPEN REDUCTION INTERNAL FIXATION FOOT Right 3/20/2015    Procedure: OPEN REDUCTION INTERNAL FIXATION FOOT;  Surgeon: Javi Herrmann DPM;  Location: [facility]     OPTICAL TRACKING SYSTEM FUSION SPINE POSTERIOR LUMBAR TWO LEVELS N/A 2020    Procedure: LUMBAR 2-SACRAL1 TRANSFORMINAL INTERBODY FUSION;  Surgeon: Lenny Gomes MD;  Location: [facility]     REMOVE PORT VASCULAR ACCESS Right 2018    Procedure: REMOVE PORT VASCULAR ACCESS;  right intra jugular port removal;  Surgeon: Keyon Blanco MD;  Location: [facility]     SHOULDER SURGERY Right 2015       Social History     Tobacco Use     Smoking status: Former Smoker     Packs/day: 3.00     Years: 10.00     Pack years: 30.00     Types: Cigarettes     Last attempt to quit: 1979     Years since quittin.4     Smokeless tobacco: Never Used     Tobacco comment: approx. 3 packs daily   Substance Use Topics     Alcohol use: Yes     Alcohol/week: 0.0 standard drinks     Comment: daily glass of wine     Family History   Problem Relation Age of Onset     Hypertension Mother      Thyroid Disease Mother      Cerebrovascular Disease Mother      Hypertension Father      Cerebrovascular Disease Father      Cancer Father         skin     Thyroid Disease Sister      Diabetes Brother         type 2     Depression Sister      Breast Cancer Sister         age 47     Cancer Sister         skin     Cancer Brother         inside nose           Reviewed and updated as needed this visit by Provider         Review of Systems   Constitutional,  HEENT, cardiovascular, pulmonary, gi and gu systems are negative, except as otherwise noted.      Objective    Gen: Patient is alert, oriented  Resp: Speaking full sentence, no audible shortness of breath.  No cough of wheeze.            Assessment/Plan:  1. Suspected Covid-19 Virus Infection  Michaela is a 70yo F with PMHx of hypertension, calling with recent exposure to a COVID positive person and has since developed symptoms of fever, sore throat, profound myalgias and headache. No shortness of breath or difficulty hydrating. Michaela meets [facility] criteria for COVID-19 PCR testing based on:  Symptoms: temperature >100.0 and new, unexplained, profound myalgia  Population: Age >=65 and Chronic disease: Hypertension  - I have ordered PCR testing, and advised patient that she will be getting a call for scheduling shortly.  - COVID-19 GetWell Loop Referral; Future   - symptomatic treatment with tylenol, fluids and rest  - [facility] guidance for self- isolation discussed and sent via [employer] [facility].   - household contact: lives with  (65 + DM, currently asx), advised to monitor for symptoms and if he develops he qualifies for testing. Also discussed that  needs to self-isolate 14 days from last contact with Michaela or 14 days from when she has recovered.      Phone call duration:  22 minutes    This patient was staffed by Dr. Hilario, who was able to speak directly with the patient.   Mallika Christian MD

## 2020-05-14 NOTE — TELEPHONE ENCOUNTER
Please triage and I can now order testing but I believe that this is still only being done at designated sites... ?  Electronically signed:    Phani Jason PA-C

## 2020-05-14 NOTE — PATIENT INSTRUCTIONS
Instructions for Patients      Your symptoms show that you may have coronavirus (COVID-19). This illness can cause fever, cough and trouble breathing.     We would like to test you for this virus. This will be a curbside test--you will drive to the clinic, and we will test you in your car.    Please follow these steps:    1. We will call to schedule your test. Be ready to share details about the car you ll be in.   2. A member of our care team will ask you some questions. Then, they will use a swab to collect samples from your nose and throat.     We will test your samples for COVID-19, the flu and maybe other illnesses as well. We will call to share your test results.    How can I protect others?    Stay home and away from others (self-isolate) until:    You ve had no fever--and no medicine that reduces fever--for 3 full days (72 hours). And      Your other symptoms have resolved (gotten better). For example, your cough or breathing has improved. And     At least 10 days have passed since your symptoms started.    Stay at least 6 feet away from others. (If someone will drive you to your test, stay in the backseat, as far away from the  as you can.)     Don t go to work, school or anywhere else. When it s time for your test, go straight to the testing site. Don t make any stops on the way there or back.     Wash your hands and face often. Use soap and water.     Cover your mouth and nose with a mask, tissue or washcloth.     Don t touch anyone. No hugging, kissing or handshakes.    How can I take care of myself?    1. Get lots of rest. Drink extra fluids (unless a doctor has told you not to).     2. Take Tylenol (acetaminophen) for fever or pain. If you have liver or kidney problems, ask your family doctor if it's okay to take Tylenol.     Adults can take either:     650 mg (two 325 mg pills) every 4 to 6 hours, or     1,000 mg (two 500 mg pills) every 8 hours as needed.     Note: Don't take more than 3,000  mg in one day.   Acetaminophen is found in many medicines (both prescribed and over-the-counter medicines). Read all labels to be sure you don't take too much.   For children, check the Tylenol bottle for the right dose. The dose is based on  the child's age or weight.    3. If you have other health problems (like cancer, heart failure, an organ transplant or severe kidney disease): Call your specialty clinic if you don't feel better in the next 2 days.    4. Know when to call 911: If your breathing is so bad that it keeps you from doing normal activities, call 911 or go to the emergency room. Tell them that you've been staying home and may have COVID-19.      Thank you for limiting contact with others, wearing a simple mask to cover your cough, practice good hand hygiene habits and accessing our virtual services where possible to limit the spread of this virus.    For more information about COVID19 and options for caring for yourself at home, please visit the CDC website at https://www.cdc.gov/coronavirus/2019-ncov/about/steps-when-sick.html  For more options for care at [facility], please visit our website at [email or web address]

## 2020-05-16 NOTE — PROGRESS NOTES
I was present during the key/critical portion of the telephone visit. The patient acknowledged that I participated in the telephone conversation. I discussed the case with the resident and agree with the note as documented by the resident.    I personally spent a total of 3 minutes speaking with Michaela Dorman during today s visit.     Everette Hilario MD  5/16/2020 at 1:16 PM

## 2020-05-22 DIAGNOSIS — Z51.11 ENCOUNTER FOR ANTINEOPLASTIC CHEMOTHERAPY: ICD-10-CM

## 2020-05-26 RX ORDER — PROCHLORPERAZINE MALEATE 10 MG
TABLET ORAL
Qty: 30 TABLET | Refills: 2 | Status: SHIPPED | OUTPATIENT
Start: 2020-05-26 | End: 2020-09-02

## 2020-05-26 NOTE — TELEPHONE ENCOUNTER
Prescription approved per [facility].    Mallika Barahona, RN, BSN

## 2020-06-15 ENCOUNTER — MYC MEDICAL ADVICE (OUTPATIENT)
Dept: FAMILY MEDICINE | Facility: OTHER | Age: 70
End: 2020-06-15

## 2020-06-15 ENCOUNTER — MYC MEDICAL ADVICE (OUTPATIENT)
Dept: NEUROSURGERY | Facility: CLINIC | Age: 70
End: 2020-06-15

## 2020-06-15 NOTE — PROGRESS NOTES
"Michaela Dorman is a 69 year old female who is being evaluated via a billable telephone visit.      The patient has been notified of following:     \"This telephone visit will be conducted via a call between you and your physician/provider. We have found that certain health care needs can be provided without the need for a physical exam.  This service lets us provide the care you need with a short phone conversation.  If a prescription is necessary we can send it directly to your pharmacy.  If lab work is needed we can place an order for that and you can then stop by our lab to have the test done at a later time.    Telephone visits are billed at different rates depending on your insurance coverage. During this emergency period, for some insurers they may be billed the same as an in-person visit.  Please reach out to your insurance provider with any questions.    If during the course of the call the physician/provider feels a telephone visit is not appropriate, you will not be charged for this service.\"    Patient has given verbal consent for Telephone visit?  Yes    What phone number would you like to be contacted at? [phone]    How would you like to obtain your AVS? Ubaldo Alas     Michaela Dorman is a 69 year old female who presents via phone visit today for the following health issues:    HPI  Depression and Anxiety Follow-Up    How are you doing with your depression since your last visit? Worsened     How are you doing with your anxiety since your last visit?  Worsened     Are you having other symptoms that might be associated with depression or anxiety? No    Have you had a significant life event? No     Do you have any concerns with your use of alcohol or other drugs? No    Social History     Tobacco Use     Smoking status: Former Smoker     Packs/day: 3.00     Years: 10.00     Pack years: 30.00     Types: Cigarettes     Last attempt to quit: 1979     Years since quittin.5     " Smokeless tobacco: Never Used     Tobacco comment: approx. 3 packs daily   Substance Use Topics     Alcohol use: Yes     Alcohol/week: 0.0 standard drinks     Comment: daily glass of wine     Drug use: No     PHQ 3/21/2019 4/8/2019 6/16/2020   PHQ-9 Total Score 10 4 3   Q9: Thoughts of better off dead/self-harm past 2 weeks Not at all Not at all Not at all     [place]-7 SCORE 3/21/2019 4/8/2019 6/16/2020   Total Score 4 (minimal anxiety) 6 (mild anxiety) -   Total Score 4 6 14     Last PHQ-9 6/16/2020   1.  Little interest or pleasure in doing things 1   2.  Feeling down, depressed, or hopeless 1   3.  Trouble falling or staying asleep, or sleeping too much 0   4.  Feeling tired or having little energy 1   5.  Poor appetite or overeating 0   6.  Feeling bad about yourself 0   7.  Trouble concentrating 0   8.  Moving slowly or restless 0   Q9: Thoughts of better off dead/self-harm past 2 weeks 0   PHQ-9 Total Score 3   Difficulty at work, home, or with people Somewhat difficult     [place]-7  6/16/2020   1. Feeling nervous, anxious, or on edge 3   2. Not being able to stop or control worrying 3   3. Worrying too much about different things 3   4. Trouble relaxing 3   5. Being so restless that it is hard to sit still 1   6. Becoming easily annoyed or irritable 0   7. Feeling afraid, as if something awful might happen 1   [place]-7 Total Score 14   If you checked any problems, how difficult have they made it for you to do your work, take care of things at home, or get along with other people? Somewhat difficult       Suicide Assessment Five-step Evaluation and Treatment (SAFE-T)      How many servings of fruits and vegetables do you eat daily?  2-3    On average, how many sweetened beverages do you drink each day (Examples: soda, juice, sweet tea, etc.  Do NOT count diet or artificially sweetened beverages)?   0    How many days per week do you exercise enough to make your heart beat faster? 6    How many minutes a day do you  exercise enough to make your heart beat faster? 60 or more    How many days per week do you miss taking your medication? 0    Patient Active Problem List   Diagnosis     Enthesopathy of hip region     Family history of stroke (cerebrovascular)     Trochanteric bursitis     Advanced directives, counseling/discussion     Health Care Home     Hypertension goal BP (blood pressure) < 140/90     Baker's cyst of knee     Patella-femoral syndrome     Adjustment disorder with depressed mood     Essential hypertension     Malignant neoplasm of upper-outer quadrant of left female breast (H)     Encounter for antineoplastic chemotherapy     S/P bilateral mastectomy     Anxiety     Hyperlipidemia LDL goal <130     S/P reverse total shoulder arthroplasty, right     Prophylactic antibiotic for dental procedure indicated due to prior joint replacement     Chronic pain syndrome     Lumbar radiculopathy     Foraminal stenosis of lumbar region     Central stenosis of spinal canal     DDD (degenerative disc disease), lumbar     CKD (chronic kidney disease) stage 3, GFR 30-59 ml/min (H)     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)     Magallanes's neuroma of right foot     Bilateral impacted cerumen     S/P lumbar fusion     Past Surgical History:   Procedure Laterality Date     ARTHROPLASTY SHOULDER Right 11/17/2015     ARTHROSCOPY KNEE  6/7/2012    Procedure:ARTHROSCOPY KNEE; right knee arthroscopy with partial lateral menisectomy; Surgeon:DAMIÁN MCALLISTER; Location:[facility]     C LIGATE FALLOPIAN TUBE       C NONSPECIFIC PROCEDURE  1956    ankle fracture surgery     COLONOSCOPY N/A 12/22/2017    Procedure: COLONOSCOPY;  colonoscopy;  Surgeon: Chuck Chand MD;  Location: [facility]     EXCISE MASS AXILLA Left 9/16/2016    Procedure: EXCISE MASS AXILLA;  Surgeon: Keyon Blanco MD;  Location: [facility]     HC REMV CATARACT EXTRACAP,INSERT LENS, W/O ECP  6/25/2009    Right eye     INJECT EPIDURAL LUMBAR N/A  2019    Procedure: interlaminar epidual steroid injection lumbar 4-5;  Surgeon: Oskar Salvador MD;  Location: [facility]     INJECT EPIDURAL LUMBAR Bilateral 2019    Procedure: lumbar 4-5 epidural interlaminar steroid injection;  Surgeon: Oskar Salvador MD;  Location: [facility]     INSERT PORT VASCULAR ACCESS Right 10/7/2016    Procedure: INSERT PORT VASCULAR ACCESS;  Surgeon: Keyon Blanco MD;  [facility]     MASTECTOMY SIMPLE BILATERAL Bilateral 2017    Procedure: MASTECTOMY SIMPLE BILATERAL;  Surgeon: Keyon Blanco MD;  Location: [facility]     OPEN REDUCTION INTERNAL FIXATION FOOT Right 3/20/2015    Procedure: OPEN REDUCTION INTERNAL FIXATION FOOT;  Surgeon: Javi Herrmann DPM;  Location: [facility]     OPTICAL TRACKING SYSTEM FUSION SPINE POSTERIOR LUMBAR TWO LEVELS N/A 2020    Procedure: LUMBAR 2-SACRAL1 TRANSFORMINAL INTERBODY FUSION;  Surgeon: Lenny Gomes MD;  Location: [facility]     REMOVE PORT VASCULAR ACCESS Right 2018    Procedure: REMOVE PORT VASCULAR ACCESS;  right intra jugular port removal;  Surgeon: Keyon Blanco MD;  Location: [facility]     SHOULDER SURGERY Right 2015       Social History     Tobacco Use     Smoking status: Former Smoker     Packs/day: 3.00     Years: 10.00     Pack years: 30.00     Types: Cigarettes     Last attempt to quit: 1979     Years since quittin.5     Smokeless tobacco: Never Used     Tobacco comment: approx. 3 packs daily   Substance Use Topics     Alcohol use: Yes     Alcohol/week: 0.0 standard drinks     Comment: daily glass of wine     Family History   Problem Relation Age of Onset     Hypertension Mother      Thyroid Disease Mother      Cerebrovascular Disease Mother      Hypertension Father      Cerebrovascular Disease Father      Cancer Father         skin     Thyroid Disease Sister      Diabetes Brother         type 2     Depression Sister      Breast Cancer Sister         age 47     Cancer Sister         skin     Cancer  Brother         inside nose         Current Outpatient Medications   Medication Sig Dispense Refill     acetaminophen (TYLENOL) 500 MG tablet Take 1,000 mg by mouth 3 times daily as needed for mild pain (500MG X 2 = 1,000MG)       ALPRAZolam (XANAX) 0.5 MG tablet TAKE ONE TABLET (0.5MG)  BY MOUTH NIGHTLY  AS NEEDED FOR SLEEP 30 tablet 0     atorvastatin (LIPITOR) 20 MG tablet Take 1 tablet (20 mg) by mouth daily 90 tablet 1     celecoxib (CELEBREX) 200 MG capsule Take 1 capsule (200 mg) by mouth 2 times daily 180 capsule 1     cholecalciferol (VITAMIN D3) 5000 units (125 mcg) capsule Take 5,000 Units by mouth daily       diphenhydrAMINE (BENADRYL) 25 MG tablet Take 25 mg by mouth nightly as needed for itching or allergies       escitalopram (LEXAPRO) 20 MG tablet Take 1.5 tablets (30 mg) by mouth daily 135 tablet 1     Garlic 1000 MG CAPS Take 1,000 mg by mouth every morning       Lansoprazole (PREVACID PO) Take 40 mg by mouth every morning        MAGNESIUM PO Take 380 mg by mouth daily       Methylcobalamin (METHYL B-12 PO) Take 5,000 Units by mouth daily       polyethylene glycol (MIRALAX/GLYCOLAX) powder Take 17 g (1 capful) by mouth daily       prochlorperazine (COMPAZINE) 10 MG tablet TAKE ONE TABLET BY MOUTH EVERY 6 HOURS AS NEEDED (NAUSEA/VOMITING) 30 tablet 2     senna-docusate (SENOKOT-S/PERICOLACE) 8.6-50 MG tablet Take 2 tablets by mouth 2 times daily 60 tablet 0     tamsulosin (FLOMAX) 0.4 MG capsule Take 1 capsule (0.4 mg) by mouth At Bedtime 90 capsule 1     Zinc 30 MG CAPS Take 30 mg by mouth daily       oxyCODONE-acetaminophen (PERCOCET) 5-325 MG tablet        Allergies   Allergen Reactions     No Known Drug Allergies      Recent Labs   Lab Test 02/05/20  0909 02/05/20  0816 01/13/20  1206 11/14/19  1255 05/10/19  0913 03/25/19  0851  09/10/18  0828  09/18/17  0840  01/24/17  1016  08/31/16  1442   A1C  --   --   --   --   --   --   --   --   --   --   --  5.0  --   --    LDL  --   --   --   --   --   87  --  108*  --  170*  --   --   --  150*   HDL  --   --   --   --   --  101  --  90  --  67  --   --   --   --    TRIG  --   --   --   --   --  70  --  89  --  178*  --   --   --   --    ALT  --   --  111* 40 35 37   < >  --    < > 25   < > 49   < >  --    CR 0.74 Canceled, Test credited, specimen discarded 0.98 0.98 1.45* 1.07*   < >  --    < > 0.98   < > 0.92   < >  --    GFRESTIMATED 82 Canceled, Test credited, specimen discarded 58* 58* 37* 53*   < >  --    < > 57*   < > 61   < >  --    GFRESTBLACK >90 Canceled, Test credited, specimen discarded 67 68 42* 61   < >  --    < > 69   < > 73   < >  --    POTASSIUM 4.0 Canceled, Test credited, specimen discarded 4.1 3.9 4.0 4.4   < >  --    < > 4.2   < > 3.9   < >  --    TSH  --   --   --   --   --   --   --   --   --   --   --  1.34  --  0.39*    < > = values in this interval not displayed.      BP Readings from Last 3 Encounters:   03/16/20 104/54   02/21/20 122/60   02/12/20 137/69    Wt Readings from Last 3 Encounters:   03/16/20 64.7 kg (142 lb 9.6 oz)   02/21/20 62.8 kg (138 lb 6.4 oz)   02/10/20 65.2 kg (143 lb 11.8 oz)                    Reviewed and updated as needed this visit by Provider         Review of Systems   Constitutional, HEENT, cardiovascular, pulmonary, GI, , musculoskeletal, neuro, skin, endocrine and psych systems are negative, except as otherwise noted.       Objective   Reported vitals:  There were no vitals taken for this visit.   healthy, alert and no distress  PSYCH: Alert and oriented times 3; coherent speech, normal   rate and volume, able to articulate logical thoughts, able   to abstract reason, no tangential thoughts, no hallucinations   or delusions  Her affect is normal and pleasant  RESP: No cough, no audible wheezing, able to talk in full sentences  Remainder of exam unable to be completed due to telephone visits    Diagnostic Test Results:  Labs reviewed in Epic        Assessment/Plan:  1. Chronic pain syndrome  3. Lumbar  radiculopathy  4. DDD (degenerative disc disease), lumbar  5. Foraminal stenosis of lumbar region  - escitalopram (LEXAPRO) 20 MG tablet; Take 1.5 tablets (30 mg) by mouth daily  Dispense: 135 tablet; Refill: 1  - celecoxib (CELEBREX) 200 MG capsule; Take 1 capsule (200 mg) by mouth 2 times daily  Dispense: 180 capsule; Refill: 1    2. Adjustment disorder with depressed mood  We will trial increased dose of her Lexapro for 3 to 4 weeks and see if we can make some improvement otherwise we will have to switch over to a different medication.  At this point in time I would question whether or not we should go back to Wellbutrin or Effexor.  Encouraged her to seek counseling and to expand her hobbies for volunteer options to add distractions her life which is worked very well in the past.  - escitalopram (LEXAPRO) 20 MG tablet; Take 1.5 tablets (30 mg) by mouth daily  Dispense: 135 tablet; Refill: 1    Return in about 4 weeks (around 7/14/2020) for Medication Recheck, recheck of current condition.      Phone call duration:  13 minutes to review care coordination and telephone call.    Phani Jason PA-C

## 2020-06-15 NOTE — TELEPHONE ENCOUNTER
[facility] [employer] message sent- please assist in scheduling   Thanks  Moira Castro RT (R)           Good morning Phani. I'm wondering if I can increase my antidepressants or change . I'm just so anxious about everything!!! I pray constantly for peace but I worry about everything. I take 1 Escitalopram 20mg a day. I don't take Xanax deisy it says for sleep?? If I should be taking xanax I need more. Konstantin Jeronimo. I forgot to mention the celebrax seems to help me. I did ask  if I could get some more Oxycodone for mornings. I take 1 because I'm so painfully stiff in the am. Then I walk 3 to 5 miles and do some about and leg work. So wondering if increase in Celebrax would help. Recovery is so SLOW. Thankyou

## 2020-06-16 ENCOUNTER — VIRTUAL VISIT (OUTPATIENT)
Dept: FAMILY MEDICINE | Facility: OTHER | Age: 70
End: 2020-06-16
Payer: COMMERCIAL

## 2020-06-16 DIAGNOSIS — F43.21 ADJUSTMENT DISORDER WITH DEPRESSED MOOD: ICD-10-CM

## 2020-06-16 DIAGNOSIS — M54.16 LUMBAR RADICULOPATHY: ICD-10-CM

## 2020-06-16 DIAGNOSIS — M51.369 DDD (DEGENERATIVE DISC DISEASE), LUMBAR: ICD-10-CM

## 2020-06-16 DIAGNOSIS — M48.061 FORAMINAL STENOSIS OF LUMBAR REGION: ICD-10-CM

## 2020-06-16 DIAGNOSIS — G89.4 CHRONIC PAIN SYNDROME: ICD-10-CM

## 2020-06-16 PROCEDURE — 99214 OFFICE O/P EST MOD 30 MIN: CPT | Mod: 95 | Performed by: PHYSICIAN ASSISTANT

## 2020-06-16 PROCEDURE — 96127 BRIEF EMOTIONAL/BEHAV ASSMT: CPT | Mod: 95 | Performed by: PHYSICIAN ASSISTANT

## 2020-06-16 RX ORDER — CELECOXIB 200 MG/1
200 CAPSULE ORAL 2 TIMES DAILY
Qty: 180 CAPSULE | Refills: 1 | Status: SHIPPED | OUTPATIENT
Start: 2020-06-16 | End: 2021-01-13

## 2020-06-16 RX ORDER — ESCITALOPRAM OXALATE 20 MG/1
30 TABLET ORAL DAILY
Qty: 135 TABLET | Refills: 1 | Status: SHIPPED | OUTPATIENT
Start: 2020-06-16 | End: 2020-10-07

## 2020-06-16 ASSESSMENT — ANXIETY QUESTIONNAIRES
6. BECOMING EASILY ANNOYED OR IRRITABLE: NOT AT ALL
1. FEELING NERVOUS, ANXIOUS, OR ON EDGE: NEARLY EVERY DAY
7. FEELING AFRAID AS IF SOMETHING AWFUL MIGHT HAPPEN: SEVERAL DAYS
GAD7 TOTAL SCORE: 14
3. WORRYING TOO MUCH ABOUT DIFFERENT THINGS: NEARLY EVERY DAY
IF YOU CHECKED OFF ANY PROBLEMS ON THIS QUESTIONNAIRE, HOW DIFFICULT HAVE THESE PROBLEMS MADE IT FOR YOU TO DO YOUR WORK, TAKE CARE OF THINGS AT HOME, OR GET ALONG WITH OTHER PEOPLE: SOMEWHAT DIFFICULT
2. NOT BEING ABLE TO STOP OR CONTROL WORRYING: NEARLY EVERY DAY
5. BEING SO RESTLESS THAT IT IS HARD TO SIT STILL: SEVERAL DAYS

## 2020-06-16 ASSESSMENT — PATIENT HEALTH QUESTIONNAIRE - PHQ9
SUM OF ALL RESPONSES TO PHQ QUESTIONS 1-9: 3
5. POOR APPETITE OR OVEREATING: NEARLY EVERY DAY

## 2020-06-17 ASSESSMENT — ANXIETY QUESTIONNAIRES: GAD7 TOTAL SCORE: 14

## 2020-06-19 DIAGNOSIS — F41.9 ANXIETY: ICD-10-CM

## 2020-06-19 DIAGNOSIS — F43.21 ADJUSTMENT DISORDER WITH DEPRESSED MOOD: ICD-10-CM

## 2020-06-19 NOTE — TELEPHONE ENCOUNTER
Pending Prescriptions:                       Disp   Refills    ALPRAZolam (XANAX) 0.5 MG tablet [Pharmacy*30 tab*0        Sig: TAKE ONE TABLET BY MOUTH AT BEDTIME AS NEEDED FOR           SLEEP    Routing refill request to provider for review/approval because:  Drug not on the [facility] refill protocol

## 2020-06-22 RX ORDER — ALPRAZOLAM 0.5 MG
TABLET ORAL
Qty: 30 TABLET | Refills: 0 | Status: SHIPPED | OUTPATIENT
Start: 2020-06-22 | End: 2020-12-18

## 2020-07-09 ENCOUNTER — MYC MEDICAL ADVICE (OUTPATIENT)
Dept: ONCOLOGY | Facility: CLINIC | Age: 70
End: 2020-07-09

## 2020-07-14 DIAGNOSIS — C50.412 MALIGNANT NEOPLASM OF UPPER-OUTER QUADRANT OF LEFT FEMALE BREAST, UNSPECIFIED ESTROGEN RECEPTOR STATUS (H): ICD-10-CM

## 2020-07-14 LAB
ALBUMIN SERPL-MCNC: 4.1 G/DL (ref 3.4–5)
ALP SERPL-CCNC: 59 U/L (ref 40–150)
ALT SERPL W P-5'-P-CCNC: 30 U/L (ref 0–50)
ANION GAP SERPL CALCULATED.3IONS-SCNC: 5 MMOL/L (ref 3–14)
AST SERPL W P-5'-P-CCNC: 21 U/L (ref 0–45)
BASOPHILS # BLD AUTO: 0 10E9/L (ref 0–0.2)
BASOPHILS NFR BLD AUTO: 0.6 %
BILIRUB SERPL-MCNC: 0.3 MG/DL (ref 0.2–1.3)
BUN SERPL-MCNC: 23 MG/DL (ref 7–30)
CALCIUM SERPL-MCNC: 9.3 MG/DL (ref 8.5–10.1)
CANCER AG27-29 SERPL-ACNC: 37 U/ML (ref 0–39)
CHLORIDE SERPL-SCNC: 106 MMOL/L (ref 94–109)
CO2 SERPL-SCNC: 26 MMOL/L (ref 20–32)
CREAT SERPL-MCNC: 1.19 MG/DL (ref 0.52–1.04)
DIFFERENTIAL METHOD BLD: ABNORMAL
EOSINOPHIL NFR BLD AUTO: 1.2 %
ERYTHROCYTE [DISTWIDTH] IN BLOOD BY AUTOMATED COUNT: 16.9 % (ref 10–15)
GFR SERPL CREATININE-BSD FRML MDRD: 46 ML/MIN/{1.73_M2}
GLUCOSE SERPL-MCNC: 85 MG/DL (ref 70–99)
HCT VFR BLD AUTO: 34 % (ref 35–47)
HGB BLD-MCNC: 11 G/DL (ref 11.7–15.7)
IMM GRANULOCYTES # BLD: 0 10E9/L (ref 0–0.4)
IMM GRANULOCYTES NFR BLD: 0.6 %
LYMPHOCYTES # BLD AUTO: 1.5 10E9/L (ref 0.8–5.3)
LYMPHOCYTES NFR BLD AUTO: 20.5 %
MCH RBC QN AUTO: 32.1 PG (ref 26.5–33)
MCHC RBC AUTO-ENTMCNC: 32.4 G/DL (ref 31.5–36.5)
MCV RBC AUTO: 99 FL (ref 78–100)
MONOCYTES # BLD AUTO: 0.6 10E9/L (ref 0–1.3)
MONOCYTES NFR BLD AUTO: 8 %
NEUTROPHILS # BLD AUTO: 5 10E9/L (ref 1.6–8.3)
NEUTROPHILS NFR BLD AUTO: 69.1 %
NRBC # BLD AUTO: 0 10*3/UL
NRBC BLD AUTO-RTO: 0 /100
PLATELET # BLD AUTO: 268 10E9/L (ref 150–450)
POTASSIUM SERPL-SCNC: 4.3 MMOL/L (ref 3.4–5.3)
PROT SERPL-MCNC: 7.7 G/DL (ref 6.8–8.8)
RBC # BLD AUTO: 3.43 10E12/L (ref 3.8–5.2)
SODIUM SERPL-SCNC: 137 MMOL/L (ref 133–144)
WBC # BLD AUTO: 7.3 10E9/L (ref 4–11)

## 2020-07-14 PROCEDURE — 80053 COMPREHEN METABOLIC PANEL: CPT | Performed by: INTERNAL MEDICINE

## 2020-07-14 PROCEDURE — 85025 COMPLETE CBC W/AUTO DIFF WBC: CPT | Performed by: INTERNAL MEDICINE

## 2020-07-14 PROCEDURE — 36415 COLL VENOUS BLD VENIPUNCTURE: CPT | Performed by: INTERNAL MEDICINE

## 2020-07-14 PROCEDURE — 86300 IMMUNOASSAY TUMOR CA 15-3: CPT | Performed by: INTERNAL MEDICINE

## 2020-07-16 ENCOUNTER — ONCOLOGY VISIT (OUTPATIENT)
Dept: ONCOLOGY | Facility: CLINIC | Age: 70
End: 2020-07-16
Payer: COMMERCIAL

## 2020-07-16 VITALS
DIASTOLIC BLOOD PRESSURE: 64 MMHG | HEART RATE: 99 BPM | SYSTOLIC BLOOD PRESSURE: 124 MMHG | BODY MASS INDEX: 21.36 KG/M2 | TEMPERATURE: 98.1 F | WEIGHT: 136.4 LBS | OXYGEN SATURATION: 95 %

## 2020-07-16 DIAGNOSIS — I10 ESSENTIAL HYPERTENSION: Primary | ICD-10-CM

## 2020-07-16 DIAGNOSIS — D64.9 ANEMIA, UNSPECIFIED TYPE: ICD-10-CM

## 2020-07-16 DIAGNOSIS — C50.412 MALIGNANT NEOPLASM OF UPPER-OUTER QUADRANT OF LEFT FEMALE BREAST, UNSPECIFIED ESTROGEN RECEPTOR STATUS (H): ICD-10-CM

## 2020-07-16 DIAGNOSIS — E78.5 HYPERLIPIDEMIA LDL GOAL <130: ICD-10-CM

## 2020-07-16 LAB
BASOPHILS # BLD AUTO: 0.1 10E9/L (ref 0–0.2)
BASOPHILS NFR BLD AUTO: 0.7 %
DIFFERENTIAL METHOD BLD: ABNORMAL
EOSINOPHIL NFR BLD AUTO: 1 %
ERYTHROCYTE [DISTWIDTH] IN BLOOD BY AUTOMATED COUNT: 16.6 % (ref 10–15)
FERRITIN SERPL-MCNC: 54 NG/ML (ref 8–252)
FOLATE SERPL-MCNC: 7.5 NG/ML
HCT VFR BLD AUTO: 35.8 % (ref 35–47)
HGB BLD-MCNC: 11.5 G/DL (ref 11.7–15.7)
IMM GRANULOCYTES # BLD: 0 10E9/L (ref 0–0.4)
IMM GRANULOCYTES NFR BLD: 0.3 %
LYMPHOCYTES # BLD AUTO: 1.4 10E9/L (ref 0.8–5.3)
LYMPHOCYTES NFR BLD AUTO: 18.9 %
MCH RBC QN AUTO: 31.9 PG (ref 26.5–33)
MCHC RBC AUTO-ENTMCNC: 32.1 G/DL (ref 31.5–36.5)
MCV RBC AUTO: 99 FL (ref 78–100)
MONOCYTES # BLD AUTO: 0.6 10E9/L (ref 0–1.3)
MONOCYTES NFR BLD AUTO: 8.4 %
NEUTROPHILS # BLD AUTO: 5.1 10E9/L (ref 1.6–8.3)
NEUTROPHILS NFR BLD AUTO: 70.7 %
NRBC # BLD AUTO: 0 10*3/UL
NRBC BLD AUTO-RTO: 0 /100
PLATELET # BLD AUTO: 284 10E9/L (ref 150–450)
RBC # BLD AUTO: 3.6 10E12/L (ref 3.8–5.2)
RETICS # AUTO: 43.9 10E9/L (ref 25–95)
RETICS/RBC NFR AUTO: 1.2 % (ref 0.5–2)
VIT B12 SERPL-MCNC: 1333 PG/ML (ref 193–986)
WBC # BLD AUTO: 7.1 10E9/L (ref 4–11)

## 2020-07-16 PROCEDURE — 85045 AUTOMATED RETICULOCYTE COUNT: CPT | Performed by: INTERNAL MEDICINE

## 2020-07-16 PROCEDURE — 99214 OFFICE O/P EST MOD 30 MIN: CPT | Performed by: INTERNAL MEDICINE

## 2020-07-16 PROCEDURE — 85060 BLOOD SMEAR INTERPRETATION: CPT | Performed by: INTERNAL MEDICINE

## 2020-07-16 PROCEDURE — 36415 COLL VENOUS BLD VENIPUNCTURE: CPT | Performed by: INTERNAL MEDICINE

## 2020-07-16 PROCEDURE — 82728 ASSAY OF FERRITIN: CPT | Performed by: INTERNAL MEDICINE

## 2020-07-16 PROCEDURE — 82746 ASSAY OF FOLIC ACID SERUM: CPT | Performed by: INTERNAL MEDICINE

## 2020-07-16 PROCEDURE — 85025 COMPLETE CBC W/AUTO DIFF WBC: CPT | Performed by: INTERNAL MEDICINE

## 2020-07-16 PROCEDURE — 82607 VITAMIN B-12: CPT | Performed by: INTERNAL MEDICINE

## 2020-07-16 ASSESSMENT — PAIN SCALES - GENERAL: PAINLEVEL: NO PAIN (0)

## 2020-07-16 NOTE — LETTER
"    7/16/2020         RE: Michaela Dorman  [address]  [facility] [address]        Dear Colleague,    Thank you for referring your patient, Michaela Dorman, to the [facility]. Please see a copy of my visit note below.    Oncology Rooming Note    July 16, 2020 10:09 AM   Michaela Dorman is a 69 year old female who presents for:    Chief Complaint   Patient presents with     Oncology Clinic Visit     6 month f/u - Malignant neoplasm of upper-outer quadrant of left female breast, unspecified estrogen receptor status     Results     Lab - 07/14/2020     Initial Vitals: /64   Pulse 99   Temp 98.1  F (36.7  C) (Temporal)   Wt 61.9 kg (136 lb 6.4 oz)   SpO2 95%   Breastfeeding No   BMI 21.36 kg/m   Estimated body mass index is 21.36 kg/m  as calculated from the following:    Height as of 3/16/20: 1.702 m (5' 7.01\").    Weight as of this encounter: 61.9 kg (136 lb 6.4 oz). Body surface area is 1.71 meters squared.  No Pain (0) Comment: Data Unavailable   No LMP recorded. Patient is postmenopausal.  Allergies reviewed: Yes  Medications reviewed: Yes    Medications: Medication refills not needed today.  Pharmacy name entered into EPIC:    [facility] [place], [place] - [address] [place] [address] [facility] - [place], [place] - [address] [facility]    Clinical concerns: Muscle fatigues and a itchy throat.. Concerns reviewed with Dr. Marty Gonsalez CMA on 7/16/2020 at 10:20 AM              Hematology/ Oncology Follow-up Visit:  Jul 16, 2020    Reason for Visit:   Chief Complaint   Patient presents with     Oncology Clinic Visit     6 month f/u - Malignant neoplasm of upper-outer quadrant of left female breast, unspecified estrogen receptor status     Results     Lab - 07/14/2020       Oncologic History:  Malignant neoplasm of upper-outer quadrant of left female breast (H)  Michaela Dorman  initially felt a mass in her " left axilla. She then underwent mammogram which showed dense breasts with no suspicious lesions in 4/2016. She was then seen by surgery and underwent excisional LN biopsy that showed metastatic high-grade poorly differentiated carcinoma with a tumor size of 2.5 cm. Immunohistochemistry was done for ER/CO receptors that came back both negative. HER-2/boubacar was amplified by FISH. She then underwent MRI of the breast on 9/26/2016 that showed suspicious abnormality with multicentric left sided breast cancer that involved the left lateral breast form the nipple to the chest wall. A PET scan was obtained on 10/5/2016 that showed a hypermetabolic opacity in the left axilla wihtout other pathological activity. It was then recommended that she undergo neoadjuvant chemotherapy with Cytoxan and Doxorubicin that will be followed by Taxol, Herceptin. Patient initiated treatment on 10/11/2016.         Interval History:  Patient is here today for follow-up visit.  She has been feeling more tired lately.  She is also have exertional dyspnea.  She had surgery back in February her hemoglobin has dropped after the surgery to 6.8.  Her hemoglobin has been gradually improving since then to 11.1.  Otherwise she denies any nausea vomiting or diarrhea.  She denies any fever or chills.  She denies any chest pain.    Review Of Systems:  Constitutional: Negative for fever, chills, and night sweats.  Skin: negative.  Eyes: negative.  Ears/Nose/Throat: negative.  Respiratory: Shortness of breath on exertion as mentioned above.  There is no wheezing, cough or hemoptysis.  Cardiovascular: negative.  Gastrointestinal: negative.  Genitourinary: negative.  Musculoskeletal: negative.  Neurologic: negative.  Psychiatric: negative.  Hematologic/Lymphatic/Immunologic: negative.  Endocrine: negative.    All other ROS negative unless mentioned in interval history.    Past medical, social, surgical, and family histories reviewed.    Allergies:  Allergies as  of 07/16/2020 - Reviewed 07/16/2020   Allergen Reaction Noted     No known drug allergies  07/05/2002       Current Medications:  Current Outpatient Medications   Medication Sig Dispense Refill     acetaminophen (TYLENOL) 500 MG tablet Take 1,000 mg by mouth 3 times daily as needed for mild pain (500MG X 2 = 1,000MG)       ALPRAZolam (XANAX) 0.5 MG tablet TAKE ONE TABLET BY MOUTH AT BEDTIME AS NEEDED FOR SLEEP 30 tablet 0     atorvastatin (LIPITOR) 20 MG tablet Take 1 tablet (20 mg) by mouth daily 90 tablet 1     celecoxib (CELEBREX) 200 MG capsule Take 1 capsule (200 mg) by mouth 2 times daily 180 capsule 1     cholecalciferol (VITAMIN D3) 5000 units (125 mcg) capsule Take 5,000 Units by mouth daily       diphenhydrAMINE (BENADRYL) 25 MG tablet Take 25 mg by mouth nightly as needed for itching or allergies       escitalopram (LEXAPRO) 20 MG tablet Take 1.5 tablets (30 mg) by mouth daily 135 tablet 1     Garlic 1000 MG CAPS Take 1,000 mg by mouth every morning       Lansoprazole (PREVACID PO) Take 40 mg by mouth every morning        MAGNESIUM PO Take 380 mg by mouth daily       Methylcobalamin (METHYL B-12 PO) Take 5,000 Units by mouth daily       polyethylene glycol (MIRALAX/GLYCOLAX) powder Take 17 g (1 capful) by mouth daily       prochlorperazine (COMPAZINE) 10 MG tablet TAKE ONE TABLET BY MOUTH EVERY 6 HOURS AS NEEDED (NAUSEA/VOMITING) 30 tablet 2     senna-docusate (SENOKOT-S/PERICOLACE) 8.6-50 MG tablet Take 2 tablets by mouth 2 times daily 60 tablet 0     tamsulosin (FLOMAX) 0.4 MG capsule Take 1 capsule (0.4 mg) by mouth At Bedtime 90 capsule 1     Zinc 30 MG CAPS Take 30 mg by mouth daily       oxyCODONE-acetaminophen (PERCOCET) 5-325 MG tablet           Physical Exam:  /64   Pulse 99   Temp 98.1  F (36.7  C) (Temporal)   Wt 61.9 kg (136 lb 6.4 oz)   SpO2 95%   Breastfeeding No   BMI 21.36 kg/m    Wt Readings from Last 12 Encounters:   07/16/20 61.9 kg (136 lb 6.4 oz)   03/16/20 64.7 kg (142 lb  "9.6 oz)   02/21/20 62.8 kg (138 lb 6.4 oz)   02/10/20 65.2 kg (143 lb 11.8 oz)   01/23/20 62.9 kg (138 lb 9.6 oz)   01/13/20 61.7 kg (136 lb)   01/09/20 62.6 kg (138 lb)   01/02/20 62.7 kg (138 lb 3.2 oz)   11/21/19 63.5 kg (139 lb 14.4 oz)   09/18/19 63.5 kg (140 lb)   09/09/19 63.8 kg (140 lb 11.2 oz)   05/14/19 61.6 kg (135 lb 14.4 oz)     GENERAL APPEARANCE: Healthy, alert and in no acute distress.  HEENT: Sclerae anicteric. PERRLA. Oropharynx without ulcers, lesions, or thrush.  NECK: Supple. No asymmetry or masses.  LYMPHATICS: No palpable cervical, supraclavicular, axillary, or inguinal lymphadenopathy.  RESP: Lungs clear to auscultation bilaterally without rales, rhonchi or wheezes.\",  BREAST: Scars of bilateral mastectomy.  \"CARDIOVASCULAR: Regular rate and rhythm. Normal S1, S2; no S3 or S4. No murmur, gallop, or rub.  ABDOMEN: Soft, nontender. Bowel sounds normal. No palpable organomegaly or masses.  MUSCULOSKELETAL: Extremities without gross deformities noted. No edema of bilateral lower extremities.  SKIN: No suspicious lesions or rashes.  NEURO: Alert and oriented x 3. Cranial nerves II-XII grossly intact.  PSYCHIATRIC: Mentation and affect appear normal.    Laboratory/Imaging Studies:  Orders Only on 07/14/2020   Component Date Value Ref Range Status     CA 27-29 07/14/2020 37  0 - 39 U/mL Final    Assay Method:  Chemiluminescence using Siemens Centaur XP     Sodium 07/14/2020 137  133 - 144 mmol/L Final     Potassium 07/14/2020 4.3  3.4 - 5.3 mmol/L Final     Chloride 07/14/2020 106  94 - 109 mmol/L Final     Carbon Dioxide 07/14/2020 26  20 - 32 mmol/L Final     Anion Gap 07/14/2020 5  3 - 14 mmol/L Final     Glucose 07/14/2020 85  70 - 99 mg/dL Final     Urea Nitrogen 07/14/2020 23  7 - 30 mg/dL Final     Creatinine 07/14/2020 1.19* 0.52 - 1.04 mg/dL Final     GFR Estimate 07/14/2020 46* >60 mL/min/[1.73_m2] Final    Comment: Non  GFR Calc  Starting 12/18/2018, serum creatinine " based estimated GFR (eGFR) will be   calculated using the Chronic Kidney Disease Epidemiology Collaboration   (CKD-EPI) equation.       GFR Estimate If Black 07/14/2020 53* >60 mL/min/[1.73_m2] Final    Comment:  GFR Calc  Starting 12/18/2018, serum creatinine based estimated GFR (eGFR) will be   calculated using the Chronic Kidney Disease Epidemiology Collaboration   (CKD-EPI) equation.       Calcium 07/14/2020 9.3  8.5 - 10.1 mg/dL Final     Bilirubin Total 07/14/2020 0.3  0.2 - 1.3 mg/dL Final     Albumin 07/14/2020 4.1  3.4 - 5.0 g/dL Final     Protein Total 07/14/2020 7.7  6.8 - 8.8 g/dL Final     Alkaline Phosphatase 07/14/2020 59  40 - 150 U/L Final     ALT 07/14/2020 30  0 - 50 U/L Final     AST 07/14/2020 21  0 - 45 U/L Final     WBC 07/14/2020 7.3  4.0 - 11.0 10e9/L Final     RBC Count 07/14/2020 3.43* 3.8 - 5.2 10e12/L Final     Hemoglobin 07/14/2020 11.0* 11.7 - 15.7 g/dL Final     Hematocrit 07/14/2020 34.0* 35.0 - 47.0 % Final     MCV 07/14/2020 99  78 - 100 fl Final     MCH 07/14/2020 32.1  26.5 - 33.0 pg Final     MCHC 07/14/2020 32.4  31.5 - 36.5 g/dL Final     RDW 07/14/2020 16.9* 10.0 - 15.0 % Final     Platelet Count 07/14/2020 268  150 - 450 10e9/L Final     Diff Method 07/14/2020 Automated Method   Final     % Neutrophils 07/14/2020 69.1  % Final     % Lymphocytes 07/14/2020 20.5  % Final     % Monocytes 07/14/2020 8.0  % Final     % Eosinophils 07/14/2020 1.2  % Final     % Basophils 07/14/2020 0.6  % Final     % Immature Granulocytes 07/14/2020 0.6  % Final     Nucleated RBCs 07/14/2020 0  0 /100 Final     Absolute Neutrophil 07/14/2020 5.0  1.6 - 8.3 10e9/L Final     Absolute Lymphocytes 07/14/2020 1.5  0.8 - 5.3 10e9/L Final     Absolute Monocytes 07/14/2020 0.6  0.0 - 1.3 10e9/L Final     Absolute Basophils 07/14/2020 0.0  0.0 - 0.2 10e9/L Final     Abs Immature Granulocytes 07/14/2020 0.0  0 - 0.4 10e9/L Final     Absolute Nucleated RBC 07/14/2020 0.0   Final        No  results found for this or any previous visit (from the past 744 hour(s)).    Assessment and plan:    (C50.412) Malignant neoplasm of upper-outer quadrant of left female breast, unspecified estrogen receptor status (H)  I reviewed with the patient the most recent laboratory tests.  There is no clinical evidence of breast cancer recurrence.  We will continue to monitor the patient's symptoms.  I will see the patient again 6 months or sooner if there are new complaints or concerns.    (E78.5) Hyperlipidemia LDL goal <130  Patient currently on atorvastatin 20 mg orally daily.    (D64.9) Anemia, unspecified type  Most recent hemoglobin is 11.1.  Hemoglobin has been improving.  Today we will check serum ferritin, B12 and folic acid and arrange for a blood smear.    (I10) Essential hypertension    Blood pressure is currently well controlled.  Patient has stopped hydrochlorothiazide because of hypotension.    The patient is ready to learn, no apparent learning barriers were identified.  Diagnosis and treatment plans were explained to the patient. The patient expressed understanding of the content. The patient asked appropriate questions. The patient questions were answered to her satisfaction.    Chart documentation with Dragon Voice recognition Software. Although reviewed after completion, some words and grammatical errors may remain.    Again, thank you for allowing me to participate in the care of your patient.        Sincerely,        Syeda Ferguson MD

## 2020-07-16 NOTE — ASSESSMENT & PLAN NOTE
Michaela Dorman  initially felt a mass in her left axilla. She then underwent mammogram which showed dense breasts with no suspicious lesions in 4/2016. She was then seen by surgery and underwent excisional LN biopsy that showed metastatic high-grade poorly differentiated carcinoma with a tumor size of 2.5 cm. Immunohistochemistry was done for ER/FL receptors that came back both negative. HER-2/boubacar was amplified by FISH. She then underwent MRI of the breast on 9/26/2016 that showed suspicious abnormality with multicentric left sided breast cancer that involved the left lateral breast form the nipple to the chest wall. A PET scan was obtained on 10/5/2016 that showed a hypermetabolic opacity in the left axilla wihtout other pathological activity. It was then recommended that she undergo neoadjuvant chemotherapy with Cytoxan and Doxorubicin that will be followed by Taxol, Herceptin. Patient initiated treatment on 10/11/2016.

## 2020-07-16 NOTE — PROGRESS NOTES
Hematology/ Oncology Follow-up Visit:  Jul 16, 2020    Reason for Visit:   Chief Complaint   Patient presents with     Oncology Clinic Visit     6 month f/u - Malignant neoplasm of upper-outer quadrant of left female breast, unspecified estrogen receptor status     Results     Lab - 07/14/2020       Oncologic History:  Malignant neoplasm of upper-outer quadrant of left female breast (H)  Michaela Dorman  initially felt a mass in her left axilla. She then underwent mammogram which showed dense breasts with no suspicious lesions in 4/2016. She was then seen by surgery and underwent excisional LN biopsy that showed metastatic high-grade poorly differentiated carcinoma with a tumor size of 2.5 cm. Immunohistochemistry was done for ER/TX receptors that came back both negative. HER-2/boubacar was amplified by FISH. She then underwent MRI of the breast on 9/26/2016 that showed suspicious abnormality with multicentric left sided breast cancer that involved the left lateral breast form the nipple to the chest wall. A PET scan was obtained on 10/5/2016 that showed a hypermetabolic opacity in the left axilla wihtout other pathological activity. It was then recommended that she undergo neoadjuvant chemotherapy with Cytoxan and Doxorubicin that will be followed by Taxol, Herceptin. Patient initiated treatment on 10/11/2016.         Interval History:  Patient is here today for follow-up visit.  She has been feeling more tired lately.  She is also have exertional dyspnea.  She had surgery back in February her hemoglobin has dropped after the surgery to 6.8.  Her hemoglobin has been gradually improving since then to 11.1.  Otherwise she denies any nausea vomiting or diarrhea.  She denies any fever or chills.  She denies any chest pain.    Review Of Systems:  Constitutional: Negative for fever, chills, and night sweats.  Skin: negative.  Eyes: negative.  Ears/Nose/Throat: negative.  Respiratory: Shortness of breath on exertion as  mentioned above.  There is no wheezing, cough or hemoptysis.  Cardiovascular: negative.  Gastrointestinal: negative.  Genitourinary: negative.  Musculoskeletal: negative.  Neurologic: negative.  Psychiatric: negative.  Hematologic/Lymphatic/Immunologic: negative.  Endocrine: negative.    All other ROS negative unless mentioned in interval history.    Past medical, social, surgical, and family histories reviewed.    Allergies:  Allergies as of 07/16/2020 - Reviewed 07/16/2020   Allergen Reaction Noted     No known drug allergies  07/05/2002       Current Medications:  Current Outpatient Medications   Medication Sig Dispense Refill     acetaminophen (TYLENOL) 500 MG tablet Take 1,000 mg by mouth 3 times daily as needed for mild pain (500MG X 2 = 1,000MG)       ALPRAZolam (XANAX) 0.5 MG tablet TAKE ONE TABLET BY MOUTH AT BEDTIME AS NEEDED FOR SLEEP 30 tablet 0     atorvastatin (LIPITOR) 20 MG tablet Take 1 tablet (20 mg) by mouth daily 90 tablet 1     celecoxib (CELEBREX) 200 MG capsule Take 1 capsule (200 mg) by mouth 2 times daily 180 capsule 1     cholecalciferol (VITAMIN D3) 5000 units (125 mcg) capsule Take 5,000 Units by mouth daily       diphenhydrAMINE (BENADRYL) 25 MG tablet Take 25 mg by mouth nightly as needed for itching or allergies       escitalopram (LEXAPRO) 20 MG tablet Take 1.5 tablets (30 mg) by mouth daily 135 tablet 1     Garlic 1000 MG CAPS Take 1,000 mg by mouth every morning       Lansoprazole (PREVACID PO) Take 40 mg by mouth every morning        MAGNESIUM PO Take 380 mg by mouth daily       Methylcobalamin (METHYL B-12 PO) Take 5,000 Units by mouth daily       polyethylene glycol (MIRALAX/GLYCOLAX) powder Take 17 g (1 capful) by mouth daily       prochlorperazine (COMPAZINE) 10 MG tablet TAKE ONE TABLET BY MOUTH EVERY 6 HOURS AS NEEDED (NAUSEA/VOMITING) 30 tablet 2     senna-docusate (SENOKOT-S/PERICOLACE) 8.6-50 MG tablet Take 2 tablets by mouth 2 times daily 60 tablet 0     tamsulosin  "(FLOMAX) 0.4 MG capsule Take 1 capsule (0.4 mg) by mouth At Bedtime 90 capsule 1     Zinc 30 MG CAPS Take 30 mg by mouth daily       oxyCODONE-acetaminophen (PERCOCET) 5-325 MG tablet           Physical Exam:  /64   Pulse 99   Temp 98.1  F (36.7  C) (Temporal)   Wt 61.9 kg (136 lb 6.4 oz)   SpO2 95%   Breastfeeding No   BMI 21.36 kg/m    Wt Readings from Last 12 Encounters:   07/16/20 61.9 kg (136 lb 6.4 oz)   03/16/20 64.7 kg (142 lb 9.6 oz)   02/21/20 62.8 kg (138 lb 6.4 oz)   02/10/20 65.2 kg (143 lb 11.8 oz)   01/23/20 62.9 kg (138 lb 9.6 oz)   01/13/20 61.7 kg (136 lb)   01/09/20 62.6 kg (138 lb)   01/02/20 62.7 kg (138 lb 3.2 oz)   11/21/19 63.5 kg (139 lb 14.4 oz)   09/18/19 63.5 kg (140 lb)   09/09/19 63.8 kg (140 lb 11.2 oz)   05/14/19 61.6 kg (135 lb 14.4 oz)     GENERAL APPEARANCE: Healthy, alert and in no acute distress.  HEENT: Sclerae anicteric. PERRLA. Oropharynx without ulcers, lesions, or thrush.  NECK: Supple. No asymmetry or masses.  LYMPHATICS: No palpable cervical, supraclavicular, axillary, or inguinal lymphadenopathy.  RESP: Lungs clear to auscultation bilaterally without rales, rhonchi or wheezes.\",  BREAST: Scars of bilateral mastectomy.  \"CARDIOVASCULAR: Regular rate and rhythm. Normal S1, S2; no S3 or S4. No murmur, gallop, or rub.  ABDOMEN: Soft, nontender. Bowel sounds normal. No palpable organomegaly or masses.  MUSCULOSKELETAL: Extremities without gross deformities noted. No edema of bilateral lower extremities.  SKIN: No suspicious lesions or rashes.  NEURO: Alert and oriented x 3. Cranial nerves II-XII grossly intact.  PSYCHIATRIC: Mentation and affect appear normal.    Laboratory/Imaging Studies:  Orders Only on 07/14/2020   Component Date Value Ref Range Status     CA 27-29 07/14/2020 37  0 - 39 U/mL Final    Assay Method:  Chemiluminescence using Siemens Centaur XP     Sodium 07/14/2020 137  133 - 144 mmol/L Final     Potassium 07/14/2020 4.3  3.4 - 5.3 mmol/L Final "     Chloride 07/14/2020 106  94 - 109 mmol/L Final     Carbon Dioxide 07/14/2020 26  20 - 32 mmol/L Final     Anion Gap 07/14/2020 5  3 - 14 mmol/L Final     Glucose 07/14/2020 85  70 - 99 mg/dL Final     Urea Nitrogen 07/14/2020 23  7 - 30 mg/dL Final     Creatinine 07/14/2020 1.19* 0.52 - 1.04 mg/dL Final     GFR Estimate 07/14/2020 46* >60 mL/min/[1.73_m2] Final    Comment: Non  GFR Calc  Starting 12/18/2018, serum creatinine based estimated GFR (eGFR) will be   calculated using the Chronic Kidney Disease Epidemiology Collaboration   (CKD-EPI) equation.       GFR Estimate If Black 07/14/2020 53* >60 mL/min/[1.73_m2] Final    Comment:  GFR Calc  Starting 12/18/2018, serum creatinine based estimated GFR (eGFR) will be   calculated using the Chronic Kidney Disease Epidemiology Collaboration   (CKD-EPI) equation.       Calcium 07/14/2020 9.3  8.5 - 10.1 mg/dL Final     Bilirubin Total 07/14/2020 0.3  0.2 - 1.3 mg/dL Final     Albumin 07/14/2020 4.1  3.4 - 5.0 g/dL Final     Protein Total 07/14/2020 7.7  6.8 - 8.8 g/dL Final     Alkaline Phosphatase 07/14/2020 59  40 - 150 U/L Final     ALT 07/14/2020 30  0 - 50 U/L Final     AST 07/14/2020 21  0 - 45 U/L Final     WBC 07/14/2020 7.3  4.0 - 11.0 10e9/L Final     RBC Count 07/14/2020 3.43* 3.8 - 5.2 10e12/L Final     Hemoglobin 07/14/2020 11.0* 11.7 - 15.7 g/dL Final     Hematocrit 07/14/2020 34.0* 35.0 - 47.0 % Final     MCV 07/14/2020 99  78 - 100 fl Final     MCH 07/14/2020 32.1  26.5 - 33.0 pg Final     MCHC 07/14/2020 32.4  31.5 - 36.5 g/dL Final     RDW 07/14/2020 16.9* 10.0 - 15.0 % Final     Platelet Count 07/14/2020 268  150 - 450 10e9/L Final     Diff Method 07/14/2020 Automated Method   Final     % Neutrophils 07/14/2020 69.1  % Final     % Lymphocytes 07/14/2020 20.5  % Final     % Monocytes 07/14/2020 8.0  % Final     % Eosinophils 07/14/2020 1.2  % Final     % Basophils 07/14/2020 0.6  % Final     % Immature Granulocytes  07/14/2020 0.6  % Final     Nucleated RBCs 07/14/2020 0  0 /100 Final     Absolute Neutrophil 07/14/2020 5.0  1.6 - 8.3 10e9/L Final     Absolute Lymphocytes 07/14/2020 1.5  0.8 - 5.3 10e9/L Final     Absolute Monocytes 07/14/2020 0.6  0.0 - 1.3 10e9/L Final     Absolute Basophils 07/14/2020 0.0  0.0 - 0.2 10e9/L Final     Abs Immature Granulocytes 07/14/2020 0.0  0 - 0.4 10e9/L Final     Absolute Nucleated RBC 07/14/2020 0.0   Final        No results found for this or any previous visit (from the past 744 hour(s)).    Assessment and plan:    (C50.412) Malignant neoplasm of upper-outer quadrant of left female breast, unspecified estrogen receptor status (H)  I reviewed with the patient the most recent laboratory tests.  There is no clinical evidence of breast cancer recurrence.  We will continue to monitor the patient's symptoms.  I will see the patient again 6 months or sooner if there are new complaints or concerns.    (E78.5) Hyperlipidemia LDL goal <130  Patient currently on atorvastatin 20 mg orally daily.    (D64.9) Anemia, unspecified type  Most recent hemoglobin is 11.1.  Hemoglobin has been improving.  Today we will check serum ferritin, B12 and folic acid and arrange for a blood smear.    (I10) Essential hypertension    Blood pressure is currently well controlled.  Patient has stopped hydrochlorothiazide because of hypotension.    The patient is ready to learn, no apparent learning barriers were identified.  Diagnosis and treatment plans were explained to the patient. The patient expressed understanding of the content. The patient asked appropriate questions. The patient questions were answered to her satisfaction.    Chart documentation with Dragon Voice recognition Software. Although reviewed after completion, some words and grammatical errors may remain.

## 2020-07-16 NOTE — PROGRESS NOTES
"Oncology Rooming Note    July 16, 2020 10:09 AM   Michaela Dorman is a 69 year old female who presents for:    Chief Complaint   Patient presents with     Oncology Clinic Visit     6 month f/u - Malignant neoplasm of upper-outer quadrant of left female breast, unspecified estrogen receptor status     Results     Lab - 07/14/2020     Initial Vitals: /64   Pulse 99   Temp 98.1  F (36.7  C) (Temporal)   Wt 61.9 kg (136 lb 6.4 oz)   SpO2 95%   Breastfeeding No   BMI 21.36 kg/m   Estimated body mass index is 21.36 kg/m  as calculated from the following:    Height as of 3/16/20: 1.702 m (5' 7.01\").    Weight as of this encounter: 61.9 kg (136 lb 6.4 oz). Body surface area is 1.71 meters squared.  No Pain (0) Comment: Data Unavailable   No LMP recorded. Patient is postmenopausal.  Allergies reviewed: Yes  Medications reviewed: Yes    Medications: Medication refills not needed today.  Pharmacy name entered into EPIC:    [facility] - [facility] [place], [place] - [address] [facility] - [place], [place] - [address] [place] [address] AVE     Clinical concerns: Muscle fatigues and a itchy throat.. Concerns reviewed with Dr. Marty Gonsalez CMA on 7/16/2020 at 10:20 AM            "

## 2020-07-16 NOTE — PATIENT INSTRUCTIONS
Laboratory tests today including ferritin, B12 and folate  Follow-up in 6 months with repeat laboratory tests.    - Today:  Please stop in to get labs done - Ferritin,B12, and Folate. Follow-up in 6 months with labs 1-5 days prior to appointment.       Lab Date/Time: Please call to schedule this.        Office visit follow up with Dr. Ferguson Date/Time: January 7th, 2021 @ 11:00AM        If you have any questions or concerns please feel free to call.    If you need to reschedule please call:  Clinic or Lab Appointment - [phone]  Infusion - [phone]  Imaging - [phone]      [facility] Oncology/Hematology Care - [place] Team

## 2020-07-16 NOTE — RESULT ENCOUNTER NOTE
Please inform the patient.  Hemoglobin is gradually improving.  B12 is mildly elevated.  Serum iron is within normal range.  Folate is within normal.  No change in plan.

## 2020-07-19 LAB — COPATH REPORT: NORMAL

## 2020-07-20 ENCOUNTER — MYC MEDICAL ADVICE (OUTPATIENT)
Dept: FAMILY MEDICINE | Facility: OTHER | Age: 70
End: 2020-07-20

## 2020-08-07 ENCOUNTER — MYC MEDICAL ADVICE (OUTPATIENT)
Dept: FAMILY MEDICINE | Facility: OTHER | Age: 70
End: 2020-08-07

## 2020-08-07 DIAGNOSIS — M48.00 CENTRAL STENOSIS OF SPINAL CANAL: Primary | ICD-10-CM

## 2020-08-08 NOTE — PROVIDER NOTIFICATION
Brief update:    Paged regarding positive influenza swab    Patient remains on droplet precautions.  New symptoms today precipitating influenza swab    Tamiflu 75 mg twice daily for 5 days initiated    Archie Barnett MD  12:13 AM     No

## 2020-08-10 RX ORDER — OXYCODONE AND ACETAMINOPHEN 5; 325 MG/1; MG/1
1 TABLET ORAL EVERY 6 HOURS PRN
Qty: 158 TABLET | Refills: 0 | Status: ON HOLD | OUTPATIENT
Start: 2020-08-10 | End: 2020-10-08

## 2020-08-10 NOTE — TELEPHONE ENCOUNTER
Pending Prescriptions:                       Disp   Refills    oxyCODONE-acetaminophen (PERCOCET) 5-325 M*                  Routing refill request to provider for review/approval because:  Medication is reported/historical    MARY GRACE VasquezN, RN, PHN

## 2020-08-31 ENCOUNTER — APPOINTMENT (OUTPATIENT)
Dept: CT IMAGING | Facility: CLINIC | Age: 70
End: 2020-08-31
Attending: PHYSICIAN ASSISTANT
Payer: MEDICARE

## 2020-08-31 ENCOUNTER — HOSPITAL ENCOUNTER (EMERGENCY)
Facility: CLINIC | Age: 70
Discharge: HOME | End: 2020-08-31
Attending: PHYSICIAN ASSISTANT | Admitting: PHYSICIAN ASSISTANT
Payer: MEDICARE

## 2020-08-31 VITALS
WEIGHT: 131 LBS | DIASTOLIC BLOOD PRESSURE: 90 MMHG | OXYGEN SATURATION: 97 % | RESPIRATION RATE: 18 BRPM | HEART RATE: 70 BPM | SYSTOLIC BLOOD PRESSURE: 150 MMHG | BODY MASS INDEX: 20.51 KG/M2

## 2020-08-31 DIAGNOSIS — G44.209 TENSION TYPE HEADACHE: ICD-10-CM

## 2020-08-31 LAB
ANION GAP SERPL CALCULATED.3IONS-SCNC: 6 MMOL/L (ref 3–14)
BASOPHILS # BLD AUTO: 0.1 10E9/L (ref 0–0.2)
BASOPHILS NFR BLD AUTO: 0.9 %
BUN SERPL-MCNC: 26 MG/DL (ref 7–30)
CALCIUM SERPL-MCNC: 10.4 MG/DL (ref 8.5–10.1)
CHLORIDE SERPL-SCNC: 103 MMOL/L (ref 94–109)
CO2 SERPL-SCNC: 28 MMOL/L (ref 20–32)
CREAT SERPL-MCNC: 1.13 MG/DL (ref 0.52–1.04)
DIFFERENTIAL METHOD BLD: ABNORMAL
EOSINOPHIL NFR BLD AUTO: 2.3 %
ERYTHROCYTE [DISTWIDTH] IN BLOOD BY AUTOMATED COUNT: 14.2 % (ref 10–15)
GFR SERPL CREATININE-BSD FRML MDRD: 49 ML/MIN/{1.73_M2}
GLUCOSE SERPL-MCNC: 91 MG/DL (ref 70–99)
HCT VFR BLD AUTO: 40 % (ref 35–47)
HGB BLD-MCNC: 13.1 G/DL (ref 11.7–15.7)
IMM GRANULOCYTES # BLD: 0 10E9/L (ref 0–0.4)
IMM GRANULOCYTES NFR BLD: 0.2 %
LYMPHOCYTES # BLD AUTO: 1.6 10E9/L (ref 0.8–5.3)
LYMPHOCYTES NFR BLD AUTO: 20.1 %
MCH RBC QN AUTO: 32.9 PG (ref 26.5–33)
MCHC RBC AUTO-ENTMCNC: 32.8 G/DL (ref 31.5–36.5)
MCV RBC AUTO: 101 FL (ref 78–100)
MONOCYTES # BLD AUTO: 0.6 10E9/L (ref 0–1.3)
MONOCYTES NFR BLD AUTO: 7.6 %
NEUTROPHILS # BLD AUTO: 5.6 10E9/L (ref 1.6–8.3)
NEUTROPHILS NFR BLD AUTO: 68.9 %
NRBC # BLD AUTO: 0 10*3/UL
NRBC BLD AUTO-RTO: 0 /100
PLATELET # BLD AUTO: 269 10E9/L (ref 150–450)
POTASSIUM SERPL-SCNC: 4.3 MMOL/L (ref 3.4–5.3)
RBC # BLD AUTO: 3.98 10E12/L (ref 3.8–5.2)
SODIUM SERPL-SCNC: 137 MMOL/L (ref 133–144)
WBC # BLD AUTO: 8.1 10E9/L (ref 4–11)

## 2020-08-31 PROCEDURE — 80048 BASIC METABOLIC PNL TOTAL CA: CPT | Performed by: PHYSICIAN ASSISTANT

## 2020-08-31 PROCEDURE — 99285 EMERGENCY DEPT VISIT HI MDM: CPT | Mod: 25 | Performed by: PHYSICIAN ASSISTANT

## 2020-08-31 PROCEDURE — 25000128 H RX IP 250 OP 636: Performed by: PHYSICIAN ASSISTANT

## 2020-08-31 PROCEDURE — 96361 HYDRATE IV INFUSION ADD-ON: CPT | Performed by: PHYSICIAN ASSISTANT

## 2020-08-31 PROCEDURE — 25800030 ZZH RX IP 258 OP 636: Performed by: PHYSICIAN ASSISTANT

## 2020-08-31 PROCEDURE — 70498 CT ANGIOGRAPHY NECK: CPT

## 2020-08-31 PROCEDURE — 70450 CT HEAD/BRAIN W/O DYE: CPT

## 2020-08-31 PROCEDURE — 85025 COMPLETE CBC W/AUTO DIFF WBC: CPT | Performed by: PHYSICIAN ASSISTANT

## 2020-08-31 PROCEDURE — 99285 EMERGENCY DEPT VISIT HI MDM: CPT | Mod: Z6 | Performed by: PHYSICIAN ASSISTANT

## 2020-08-31 PROCEDURE — 96375 TX/PRO/DX INJ NEW DRUG ADDON: CPT | Mod: 59 | Performed by: PHYSICIAN ASSISTANT

## 2020-08-31 PROCEDURE — 25000125 ZZHC RX 250: Performed by: PHYSICIAN ASSISTANT

## 2020-08-31 PROCEDURE — 96374 THER/PROPH/DIAG INJ IV PUSH: CPT | Mod: 59 | Performed by: PHYSICIAN ASSISTANT

## 2020-08-31 RX ORDER — IOPAMIDOL 755 MG/ML
500 INJECTION, SOLUTION INTRAVASCULAR ONCE
Status: COMPLETED | OUTPATIENT
Start: 2020-08-31 | End: 2020-08-31

## 2020-08-31 RX ORDER — DIPHENHYDRAMINE HYDROCHLORIDE 50 MG/ML
25 INJECTION INTRAMUSCULAR; INTRAVENOUS ONCE
Status: COMPLETED | OUTPATIENT
Start: 2020-08-31 | End: 2020-08-31

## 2020-08-31 RX ORDER — METOCLOPRAMIDE HYDROCHLORIDE 5 MG/ML
5 INJECTION INTRAMUSCULAR; INTRAVENOUS ONCE
Status: COMPLETED | OUTPATIENT
Start: 2020-08-31 | End: 2020-08-31

## 2020-08-31 RX ORDER — KETOROLAC TROMETHAMINE 15 MG/ML
15 INJECTION, SOLUTION INTRAMUSCULAR; INTRAVENOUS ONCE
Status: COMPLETED | OUTPATIENT
Start: 2020-08-31 | End: 2020-08-31

## 2020-08-31 RX ADMIN — KETOROLAC TROMETHAMINE 15 MG: 15 INJECTION, SOLUTION INTRAMUSCULAR; INTRAVENOUS at 14:17

## 2020-08-31 RX ADMIN — SODIUM CHLORIDE 1000 ML: 9 INJECTION, SOLUTION INTRAVENOUS at 14:15

## 2020-08-31 RX ADMIN — HYDROMORPHONE HYDROCHLORIDE 1 MG: 1 INJECTION, SOLUTION INTRAMUSCULAR; INTRAVENOUS; SUBCUTANEOUS at 12:45

## 2020-08-31 RX ADMIN — DIPHENHYDRAMINE HYDROCHLORIDE 25 MG: 50 INJECTION, SOLUTION INTRAMUSCULAR; INTRAVENOUS at 14:15

## 2020-08-31 RX ADMIN — METOCLOPRAMIDE 5 MG: 5 INJECTION, SOLUTION INTRAMUSCULAR; INTRAVENOUS at 14:18

## 2020-08-31 RX ADMIN — SODIUM CHLORIDE 100 ML: 9 INJECTION, SOLUTION INTRAVENOUS at 13:18

## 2020-08-31 RX ADMIN — IOPAMIDOL 70 ML: 755 INJECTION, SOLUTION INTRAVENOUS at 13:18

## 2020-08-31 NOTE — ED TRIAGE NOTES
Pt had sudden onset of headache yesterday, took xanax and oxycodone, and went to sleep for the night.  Woke up fine, headache came back at around 10:00am  Pain worse than when she had a brain bleed.     Patient's airway, breathing, circulation, and disability/mental status (ABCDs) intact/WDL during triage.

## 2020-08-31 NOTE — DISCHARGE INSTRUCTIONS
Your work-up today was reassuring that you are not having bleeding on your brain at this time.  You did have stenosis, or narrowing, of your carotid arteries which will just need to be monitored.  You also had a small outpouching on one of your carotid arteries that could be a sign of an aneurysm.  Please call your clinic provider and let them know you were here and schedule follow-up as needed to discuss imaging results.  As discussed if you develop any worsening symptoms please return to the emergency department.    Thank you for choosing [facility] Emergency Department. It was a pleasure taking care of you today. If you have any questions, please call [phone].    Geneva Calles PA-C

## 2020-08-31 NOTE — ED PROVIDER NOTES
History     Chief Complaint   Patient presents with     Headache     HPI  Michaela Dorman is a 69 year old female who presents to the emergency department complaining of a headache.  She reports yesterday afternoon she had a sudden onset of severe headache in the temporal region.  She states her neck felt tight as well in the right musculature.  She ended up taking an oxycodone and Xanax and went to bed around 4 PM.  When she woke up she felt better and she went to exercise when the headache came back even worse than initially and has not gone away.  She has a history of a previous brain bleed and states this is worse.  With this headache, she denies any visual changes, dizziness, nausea/vomiting, numbness or weakness in extremities.      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       Central stenosis of spinal canal 12/01/2017     Priority: Medium     lumbar         DDD (degenerative disc disease), lumbar 12/01/2017     Priority: Medium     Chronic pain syndrome 11/30/2017     Priority: Medium     substancePatient is followed by Phani Jason PA-C for ongoing prescription of pain medication.  All refills should only be approved by this provider, or covering partner.    Medication(s): Oxycodone IR 5mg.   Maximum quantity  per month: 20  Clinic visit frequency required: Q 3 months     Controlled substance agreement:  Encounter-Level CSA:     There are no encounter-level csa.        Pain Clinic evaluation in the past: Yes       Date/Location:      DIRE Total Score(s):  No flowsheet data found.    Last [facility] website verification:  none   [email or web address]             Lumbar radiculopathy 11/30/2017     Priority: Medium     S/P reverse total shoulder arthroplasty, right 06/05/2017     Priority: Medium     Prophylactic antibiotic for dental procedure indicated due to prior joint replacement 06/05/2017     Priority: Medium     Hyperlipidemia LDL goal <130 05/30/2017     Priority: Medium     Anxiety 03/29/2017     Priority: Medium     S/P bilateral mastectomy 02/08/2017     Priority: Medium     Encounter for antineoplastic chemotherapy 10/07/2016     Priority: Medium     Malignant neoplasm of upper-outer quadrant of left female breast (H) 10/06/2016     Priority: Medium     Adjustment disorder with depressed mood 11/11/2015     Priority: Medium     Essential hypertension 11/11/2015     Priority: Medium     Baker's cyst of knee 02/09/2015     Priority: Medium     Patella-femoral syndrome 02/09/2015     Priority: Medium     Hypertension goal BP (blood pressure) < 140/90 10/04/2011     Priority: Medium     Health Care Home 09/29/2011     Priority: Medium     x  DX V65.8 REPLACED WITH 45231 HEALTH CARE HOME (04/08/2013)       Advanced directives, counseling/discussion 08/22/2011     Priority: Medium     Advance Directive Problem List Overview:   Name Relationship Phone    Primary Health Care Agent            Alternative Health Care Agent          Patient states has Advance Directive and will bring in a copy to clinic. 8/22/2011        Geneva Calles PA-C  08/31/20 1541         Trochanteric bursitis 01/06/2009     Priority: Medium     Family history of stroke (cerebrovascular) 03/07/2008     Priority: Medium     Enthesopathy  of hip region 01/30/2006     Priority: Medium        Past Medical History:    Past Medical History:   Diagnosis Date     Central stenosis of spinal canal 12/1/2017     DDD (degenerative disc disease), lumbar 12/1/2017     Depressive disorder, not elsewhere classified      Esophageal reflux      ETOH abuse      Foraminal stenosis of lumbar region 12/1/2017     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     Subdural hematoma (H)        Past Surgical History:    Past Surgical History:   Procedure Laterality Date     ARTHROPLASTY SHOULDER Right 11/17/2015     ARTHROSCOPY KNEE  6/7/2012    Procedure:ARTHROSCOPY KNEE; right knee arthroscopy with partial lateral menisectomy; Surgeon:DAMIÁN MCALLISTER; Location:[facility] OR     C LIGATE FALLOPIAN TUBE       C NONSPECIFIC PROCEDURE  1956    ankle fracture surgery     COLONOSCOPY N/A 12/22/2017    Procedure: COLONOSCOPY;  colonoscopy;  Surgeon: Chuck Chand MD;  Location: [facility]     EXCISE MASS AXILLA Left 9/16/2016    Procedure: EXCISE MASS AXILLA;  Surgeon: Keyon Blanco MD;  Location: [facility] OR     HC REMV CATARACT EXTRACAP,INSERT LENS, W/O ECP  6/25/2009    Right eye     INJECT EPIDURAL LUMBAR N/A 4/11/2019    Procedure: interlaminar epidual steroid injection lumbar 4-5;  Surgeon: Oskar Salvador MD;  Location: [facility] OR     INJECT EPIDURAL LUMBAR Bilateral 9/12/2019    Procedure: lumbar 4-5 epidural interlaminar steroid injection;  Surgeon: Oskar Salvador MD;  Location: [facility]     INSERT PORT VASCULAR ACCESS Right 10/7/2016    Procedure: INSERT PORT VASCULAR ACCESS;  Surgeon: Keyon Blanco MD;  Location: [facility]     MASTECTOMY SIMPLE BILATERAL Bilateral 2/8/2017    Procedure: MASTECTOMY SIMPLE BILATERAL;  Surgeon: Keyon Blanco MD;  Location: [facility]     OPEN REDUCTION INTERNAL FIXATION FOOT Right 3/20/2015    Procedure: OPEN  REDUCTION INTERNAL FIXATION FOOT;  Surgeon: Javi Herrmann DPM;  Location: [facility]     OPTICAL TRACKING SYSTEM FUSION SPINE POSTERIOR LUMBAR TWO LEVELS N/A 2020    Procedure: LUMBAR 2-SACRAL1 TRANSFORMINAL INTERBODY FUSION;  Surgeon: Lenny Gomes MD;  Location: [facility] OR     REMOVE PORT VASCULAR ACCESS Right 2018    Procedure: REMOVE PORT VASCULAR ACCESS;  right intra jugular port removal;  Surgeon: Keyon Blanco MD;  Location: [facility] OR     SHOULDER SURGERY Right 2015       Family History:    Family History   Problem Relation Age of Onset     Hypertension Mother      Thyroid Disease Mother      Cerebrovascular Disease Mother      Hypertension Father      Cerebrovascular Disease Father      Cancer Father         skin     Thyroid Disease Sister      Diabetes Brother         type 2     Depression Sister      Breast Cancer Sister         age 47     Cancer Sister         skin     Cancer Brother         inside nose       Social History:  Marital Status:   [2]  Social History     Tobacco Use     Smoking status: Former Smoker     Packs/day: 3.00     Years: 10.00     Pack years: 30.00     Types: Cigarettes     Last attempt to quit: 1979     Years since quittin.7     Smokeless tobacco: Never Used     Tobacco comment: approx. 3 packs daily   Substance Use Topics     Alcohol use: Yes     Alcohol/week: 0.0 standard drinks     Comment: daily glass of wine     Drug use: No        Medications:    acetaminophen (TYLENOL) 500 MG tablet  ALPRAZolam (XANAX) 0.5 MG tablet  atorvastatin (LIPITOR) 20 MG tablet  celecoxib (CELEBREX) 200 MG capsule  cholecalciferol (VITAMIN D3) 5000 units (125 mcg) capsule  diphenhydrAMINE (BENADRYL) 25 MG tablet  escitalopram (LEXAPRO) 20 MG tablet  Garlic 1000 MG CAPS  Lansoprazole (PREVACID PO)  MAGNESIUM PO  Methylcobalamin (METHYL B-12 PO)  oxyCODONE-acetaminophen (PERCOCET) 5-325 MG tablet  polyethylene glycol (MIRALAX/GLYCOLAX) powder  prochlorperazine (COMPAZINE)  10 MG tablet  senna-docusate (SENOKOT-S/PERICOLACE) 8.6-50 MG tablet  tamsulosin (FLOMAX) 0.4 MG capsule  Zinc 30 MG CAPS          Review of Systems   All other systems reviewed and are negative.      Physical Exam   BP: (!) 161/107  Pulse: 77  Resp: 14  Weight: 59.4 kg (131 lb)  SpO2: 97 %      Physical Exam  Vitals signs and nursing note reviewed.   Constitutional:       General: She is not in acute distress.     Appearance: Normal appearance. She is well-developed and normal weight. She is not ill-appearing, toxic-appearing or diaphoretic.   HENT:      Head: Normocephalic and atraumatic.      Nose: Nose normal.      Mouth/Throat:      Mouth: Mucous membranes are moist.      Pharynx: Oropharynx is clear.   Eyes:      Extraocular Movements: Extraocular movements intact.      Conjunctiva/sclera: Conjunctivae normal.      Pupils: Pupils are equal, round, and reactive to light.   Neck:      Musculoskeletal: Normal range of motion and neck supple.      Comments: Mild tenderness and tightness to the right trapezius musculature.  Cardiovascular:      Rate and Rhythm: Normal rate and regular rhythm.      Heart sounds: Normal heart sounds.   Pulmonary:      Effort: Pulmonary effort is normal. No respiratory distress.      Breath sounds: Normal breath sounds.   Abdominal:      General: Bowel sounds are normal. There is no distension.      Palpations: Abdomen is soft.      Tenderness: There is no abdominal tenderness.   Musculoskeletal:         General: No deformity.   Skin:     General: Skin is warm and dry.   Neurological:      General: No focal deficit present.      Mental Status: She is alert and oriented to person, place, and time.      Cranial Nerves: No cranial nerve deficit.      Motor: No weakness.      Coordination: Coordination normal.      Gait: Gait normal.   Psychiatric:         Mood and Affect: Mood normal.         Behavior: Behavior normal.         ED Course        Procedures      Results for orders placed or  performed during the hospital encounter of 08/31/20 (from the past 24 hour(s))   CBC with platelets differential   Result Value Ref Range    WBC 8.1 4.0 - 11.0 10e9/L    RBC Count 3.98 3.8 - 5.2 10e12/L    Hemoglobin 13.1 11.7 - 15.7 g/dL    Hematocrit 40.0 35.0 - 47.0 %     (H) 78 - 100 fl    MCH 32.9 26.5 - 33.0 pg    MCHC 32.8 31.5 - 36.5 g/dL    RDW 14.2 10.0 - 15.0 %    Platelet Count 269 150 - 450 10e9/L    Diff Method Automated Method     % Neutrophils 68.9 %    % Lymphocytes 20.1 %    % Monocytes 7.6 %    % Eosinophils 2.3 %    % Basophils 0.9 %    % Immature Granulocytes 0.2 %    Nucleated RBCs 0 0 /100    Absolute Neutrophil 5.6 1.6 - 8.3 10e9/L    Absolute Lymphocytes 1.6 0.8 - 5.3 10e9/L    Absolute Monocytes 0.6 0.0 - 1.3 10e9/L    Absolute Basophils 0.1 0.0 - 0.2 10e9/L    Abs Immature Granulocytes 0.0 0 - 0.4 10e9/L    Absolute Nucleated RBC 0.0    Basic metabolic panel   Result Value Ref Range    Sodium 137 133 - 144 mmol/L    Potassium 4.3 3.4 - 5.3 mmol/L    Chloride 103 94 - 109 mmol/L    Carbon Dioxide 28 20 - 32 mmol/L    Anion Gap 6 3 - 14 mmol/L    Glucose 91 70 - 99 mg/dL    Urea Nitrogen 26 7 - 30 mg/dL    Creatinine 1.13 (H) 0.52 - 1.04 mg/dL    GFR Estimate 49 (L) >60 mL/min/[1.73_m2]    GFR Estimate If Black 57 (L) >60 mL/min/[1.73_m2]    Calcium 10.4 (H) 8.5 - 10.1 mg/dL   CT Head w/o Contrast    Narrative    CT SCAN OF THE HEAD WITHOUT CONTRAST   8/31/2020 1:20 PM     HISTORY: Headache, acute, severe, worst headache of life.    TECHNIQUE:  Axial images of the head and coronal reformations without  IV contrast material. Radiation dose for this scan was reduced using  automated exposure control, adjustment of the mA and/or kV according  to patient size, or iterative reconstruction technique.    COMPARISON: CT head dated 2/26/2018.    FINDINGS: There is no evidence of intracranial hemorrhage, mass, acute  infarct or anomaly. The ventricles are normal in size, shape  and  configuration. Mild diffuse parenchymal volume loss. Mild patchy  periventricular white matter hypodensities which are nonspecific, but  likely related to chronic microvascular ischemic disease.     Bilateral lens replacements. Visualized orbits otherwise normal. The  visualized aspects of the paranasal sinuses, mastoids, and middle ear  cavities are clear. The bony calvarium and bones of the skull base  appear intact.       Impression    IMPRESSION:     1. No evidence of acute intracranial hemorrhage, mass, or herniation.  2. Mild diffuse parenchymal volume loss and white matter changes  likely due to chronic microvascular ischemic disease.    LISA RODRIGUEZ MD   CTA Head Neck with Contrast    Narrative    CT ANGIOGRAM OF THE HEAD AND NECK WITH CONTRAST  8/31/2020 1:27 PM     HISTORY: Severe headache, history of brain bleed.     TECHNIQUE:  CT angiography with an injection of 70mL, Isovue-370 IV  with scans through the head and neck. Images were transferred to a  separate 3-D workstation where multiplanar reformations and 3-D images  were created. Estimates of carotid stenoses are made relative to the  distal internal carotid artery diameters except as noted. Radiation  dose for this scan was reduced using automated exposure control,  adjustment of the mA and/or kV according to patient size, or iterative  reconstruction technique.    COMPARISON: Carotid Doppler ultrasound 1/14/2020     CT ANGIOGRAM HEAD FINDINGS:     The vertebral arteries, basilar artery, and posterior cerebral  arteries are patent. The internal carotid arteries, anterior cerebral  arteries, and middle cerebral arteries are patent. Moderate plaquing  is present involving the bilateral carotid siphons, left worse than  right. Fetal origin of the right posterior cerebral artery. A 2 mm  conically shaped outpouching is present at the right internal carotid  artery cavernous segment (series 7 image 436).    CT ANGIOGRAM NECK FINDINGS:   A  three-vessel aortic arch is present. The bilateral common carotid,  internal carotid, external carotid, and vertebral arteries are patent.  Left internal carotid artery is asymmetrically small compared to the  right. Severe calcifications are present at the bilateral carotid  bifurcations. Approximately 40% stenosis of the proximal right  internal carotid artery. Approximately 70-80% stenosis of the proximal  left internal carotid artery at the bifurcation. Mild plaquing is  present at the bilateral vertebral artery origins.    Scattered dental disease is present. Periapical abscess is present  involving tooth #5. Mild cervical spine degenerative change is present  most conspicuous at C5-C6.       Impression    IMPRESSION:   CTA Head:   1. No evidence of large-vessel occlusion or high-grade stenosis.  2. Left internal carotid artery is asymmetrically small compared to  the left.  3. Incidental 2 mm outpouching off the cavernous segment of the right  internal carotid artery which could represent infundibulum or  aneurysm. Follow-up could be performed in 6-12 months.   CTA Neck:   1. Severe plaquing at the bilateral carotid bifurcations.  2. Approximately 80% stenosis of the proximal left internal carotid  artery.   3. Approximately 40% stenosis of the proximal right internal carotid  artery.    NIDA SALAS MD     *Note: Due to a large number of results and/or encounters for the requested time period, some results have not been displayed. A complete set of results can be found in Results Review.       Medications   HYDROmorphone (DILAUDID) injection 1 mg (1 mg Intravenous Given 8/31/20 1245)   iopamidol (ISOVUE-370) solution 500 mL (70 mLs Intravenous Given 8/31/20 1318)   sodium chloride 0.9 % bag 500mL for CT scan flush use (100 mLs As instructed Given 8/31/20 1318)   sodium chloride (PF) 0.9% PF flush 3 mL (3 mLs Intravenous Given 8/31/20 1317)   0.9% sodium chloride BOLUS (1,000 mLs Intravenous New Bag 8/31/20  1415)   metoclopramide (REGLAN) injection 5 mg (5 mg Intravenous Given 8/31/20 1418)   ketorolac (TORADOL) injection 15 mg (15 mg Intravenous Given 8/31/20 1417)   diphenhydrAMINE (BENADRYL) injection 25 mg (25 mg Intravenous Given 8/31/20 1415)       Assessments & Plan (with Medical Decision Making)  Michaela Dorman is a 69 year old female who presented to the ED complaining of a severe headache that started yesterday and persisted through today.  It is actually gotten better overnight but returned after she started exercising.  Relates that it is worse than her previous brain bleed.  She denies any associated visual changes, dizziness, numbness/weakness, or nausea/vomiting.  On arrival to the ED her blood pressure was elevated but otherwise she had normal vital signs.  Exam revealed some mild tenderness to the right trapezius musculature but no focal neurological deficits demonstrated on exam.  There is concern for intracranial hemorrhage given her symptomology so IV was established, labs drawn, and she was sent to CT for CT head and CTA head and neck.  She was given Dilaudid initially for pain control until head bleed was ruled out.  Head CT today was fortunately negative for any acute bleed.  Her CTA demonstrated severe plaquing at the bilateral carotid bifurcations with stenosis of both internal carotid arteries, left greater than right.  She also had findings of an outpouching in the right internal carotid artery which could represent infundibulum or aneurysm.  CBC and BMP were at her baseline.  With no head bleed on CT and no findings of acute stroke, patient was agreeable to treating headache further with IV headache protocol medications.  The Dilaudid did bring the headache down to a 5/10.  She received IV fluids, Toradol, Reglan, and Benadryl and on reassessment she reported headache had completely resolved.  Her neurological status did not change throughout the course of her ED stay and after talking  with Dr. Alegria it was decided that likelihood of occult intracranial hemorrhage is quite low so we held on further imaging at this time.  I suspect some of her headache was related to tension from her neck muscles being tight. I did speak with Dr. Jones of neurology regarding her CTA findings and she advised no further intervention or follow-up needed for her carotid stenosis.  Regarding the outpouching, she will require monitoring of this and potential endovascular consult.  I discussed these recommendations with the patient and she was comfortable with the plan to discharge home and contact her clinic provider to discuss CTA findings further and schedule follow-up if warranted.  In the meantime she was advised that if headache recurs she could take ibuprofen or Tylenol at home however if she develops severe worsening symptoms she should return to the ED.  All questions were answered and the patient was discharged home in stable condition.     I have reviewed the nursing notes.    I have reviewed the findings, diagnosis, plan and need for follow up with the patient.    New Prescriptions    No medications on file       Final diagnoses:   Tension type headache     Note: Chart documentation done in part with Dragon Voice Recognition software. Although reviewed after completion, some word and grammatical errors may remain.     8/31/2020   [facility] EMERGENCY DEPARTMENT

## 2020-08-31 NOTE — CONSULTS
[facility]    Stroke Telephone Note    I was called by KINA Yang on 08/31/20 at 15:12 regarding patient Michaela Dorman. The patient is a 69 year old female with past medical history of CKD stage 3, hyperlipidemia, hypertension and anxiety who presented with worst headache of life.  Per ED staff the patient had worst headache of life yesterday that was worse than when she had a prior brain bleed but improved with oxycodone, and was gone when she went to sleep.  When she woke up it had returned but after medications in the emergency department it had resolved again.  CT was without acute findings.  CTA showed bilateral carotid stenosis, worse on the left at 80%, and an infundibulum veruss aneurysm off the carotid.  Reportedly normal neurologic examination    Stroke Code Data  (for stroke code without tele): N/A not an acute stroke page  Stroke code activated         First stroke provider response         Last known normal         Time of discovery   (or onset of symptoms)         Head CT read by me         Was stroke code de-escalated?                TPA Treatment   Not a stroke page    Endovascular Treatment  Not initiated due to absence of proximal vessel occlusion    Impression  1. Worst headache of life  2. Bilateral carotid stenosis, 80% on the left  3. Right ICA outpouching, possible aneurysm versus infundibulum    Overall the bilateral carotid stenosis is currently asymptomatic as she is neurologically intact and does not have a reported history of TIA or stroke that correlates with this so no intervention is indicated at this time.  Unless she develops associated symptoms this will likely remain the case.  In terms of her right ICA potential aneurysm she can be monitored in endovascular clinic.  If clinical concern for potential bleed or subarachnoid hemorrhage persists can consider further work up with MRI imaging as CT is less sensitive 6 hours from onset of  "symptoms.    Recommendations  1. Follow up in endovascular clinic for aneurysm monitoring - if she would like to be seen at [facility] please place referral to neurosurgery clinic and put in comments Dr. Whaley for endovascular evaluation  2. If clinical concern persists for SAH then recommend MRI Brain without IV contrast with GRE and SWI sequences    Please page stroke doctor on call with further questions.    My recommendations are based on the information provided on the phone by KINA Yang.     Keyona Tapia MD   Neurocritical Care  To page me or covering stroke neurology team member, click here: [facility]COM   Choose \"On Call\" tab at top, then search dropdown box for \"Neurology Adult\", select location, press Enter, then look for stroke/neuro ICU/telestroke.             "

## 2020-08-31 NOTE — ED AVS SNAPSHOT
[facility] Emergency Department  [address] [place] [address]  Phone:  [phone]  Fax:  [phone]                                    Michaela Dorman   MRN: [id number]    Department:  [facility] Emergency Department   Date of Visit:  8/31/2020           After Visit Summary Signature Page    I have received my discharge instructions, and my questions have been answered. I have discussed any challenges I see with this plan with the nurse or doctor.    ..........................................................................................................................................  Patient/Patient Representative Signature      ..........................................................................................................................................  Patient Representative Print Name and Relationship to Patient    ..................................................               ................................................  Date                                   Time    ..........................................................................................................................................  Reviewed by Signature/Title    ...................................................              ..............................................  Date                                               Time          22EPIC Rev 08/18

## 2020-09-01 ENCOUNTER — TELEPHONE (OUTPATIENT)
Dept: FAMILY MEDICINE | Facility: OTHER | Age: 70
End: 2020-09-01

## 2020-09-01 NOTE — TELEPHONE ENCOUNTER
Reason for call:  Other   Patient called regarding (reason for call): call back  Additional comments: Patient was in ER yesterday and is asking for Dr.de Elmore to take a look at results from ER visit yesterday to determine if she needs a follow up appointment with him per pt.    Phone number to reach patient:  Cell number on file:    Telephone Information:   Mobile [phone]       Best Time:  Anytime    Can we leave a detailed message on this number?  YES    Travel screening: Not Applicable

## 2020-09-02 DIAGNOSIS — Z51.11 ENCOUNTER FOR ANTINEOPLASTIC CHEMOTHERAPY: ICD-10-CM

## 2020-09-02 PROBLEM — I65.23 CAROTID ARTERY PLAQUE, BILATERAL: Status: ACTIVE | Noted: 2020-09-02

## 2020-09-02 PROBLEM — I65.23 CAROTID STENOSIS, BILATERAL: Status: ACTIVE | Noted: 2020-09-02

## 2020-09-02 RX ORDER — PROCHLORPERAZINE MALEATE 10 MG
TABLET ORAL
Qty: 30 TABLET | Refills: 0 | Status: SHIPPED | OUTPATIENT
Start: 2020-09-02 | End: 2020-11-19

## 2020-09-02 NOTE — PROGRESS NOTES
Subjective     Michaela Dorman is a 69 year old female who presents to clinic today for the following health issues:    HPI       ED/UC Followup:    Facility:  [facility]   Date of visit: 8/31/20  Reason for visit: Tension type headache   Current Status: Improved          Patient Active Problem List   Diagnosis     Enthesopathy of hip region     Family history of stroke (cerebrovascular)     Trochanteric bursitis     Advanced directives, counseling/discussion     Health Care Home     Hypertension goal BP (blood pressure) < 140/90     Baker's cyst of knee     Patella-femoral syndrome     Adjustment disorder with depressed mood     Essential hypertension     Malignant neoplasm of upper-outer quadrant of left female breast (H)     Encounter for antineoplastic chemotherapy     S/P bilateral mastectomy     Anxiety     Hyperlipidemia LDL goal <130     S/P reverse total shoulder arthroplasty, right     Prophylactic antibiotic for dental procedure indicated due to prior joint replacement     Chronic pain syndrome     Lumbar radiculopathy     Foraminal stenosis of lumbar region     Central stenosis of spinal canal     DDD (degenerative disc disease), lumbar     CKD (chronic kidney disease) stage 3, GFR 30-59 ml/min (H)     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      Objective    /86   Pulse 79   Temp 97.6  F (36.4  C) (Temporal)   Resp 16   Wt 61.9 kg (136 lb 6.4 oz)   SpO2 99%   BMI 21.36 kg/m    Body mass index is 21.36 kg/m .  Physical Exam   GENERAL:  healthy, alert and no distress  NECK: no adenopathy, no asymmetry, masses, or scars and thyroid normal to palpation  RESP: lungs clear to auscultation - no rales, rhonchi or wheezes  CV: regular rate and rhythm, normal S1 S2, no S3 or S4, no murmur, click or rub, no peripheral edema and peripheral pulses strong  ABDOMEN: soft, nontender, no hepatosplenomegaly, no masses and bowel sounds normal  MS: no gross musculoskeletal defects noted, no edema    No results found. However, due to the size of the patient record, not all encounters were searched. Please check Results Review for a complete set of results.        Assessment & Plan     1. Carotid artery plaque, bilateral  2. Carotid stenosis, bilateral L80%, R40%  3. Hypertension goal BP (blood pressure) < 140/90  4. Adjustment disorder with depressed mood  5. Central stenosis of spinal canal  6. POSSIBLE Right internal carotid artery aneurysm  Further evaluation with general surgery for starts.  Further imaging may need to be considered like an MRA.  She may indeed need to see a neurosurgical vascular specialist related to this.  - aspirin (ASA) 81 MG chewable tablet; Take 1 tablet (81 mg) by mouth daily  Dispense: 100 tablet; Refill: 3  - Albumin Random Urine Quantitative with Creat Ratio; Future  - Lipid panel reflex to direct LDL Fasting; Future  - GENERAL SURG ADULT REFERRAL; Future    Work on weight loss  Regular exercise   Return in about 3 weeks (around 9/29/2020) for recheck of current condition, if symptoms do not improve.    Phani Jason PA-C  [facility]

## 2020-09-08 ENCOUNTER — OFFICE VISIT (OUTPATIENT)
Dept: FAMILY MEDICINE | Facility: OTHER | Age: 70
End: 2020-09-08
Payer: COMMERCIAL

## 2020-09-08 VITALS
SYSTOLIC BLOOD PRESSURE: 132 MMHG | TEMPERATURE: 97.6 F | DIASTOLIC BLOOD PRESSURE: 86 MMHG | HEART RATE: 79 BPM | OXYGEN SATURATION: 99 % | RESPIRATION RATE: 16 BRPM | WEIGHT: 136.4 LBS | BODY MASS INDEX: 21.36 KG/M2

## 2020-09-08 DIAGNOSIS — I65.23 CAROTID ARTERY PLAQUE, BILATERAL: Primary | ICD-10-CM

## 2020-09-08 DIAGNOSIS — F43.21 ADJUSTMENT DISORDER WITH DEPRESSED MOOD: ICD-10-CM

## 2020-09-08 DIAGNOSIS — I67.1 RIGHT INTERNAL CAROTID ARTERY ANEURYSM: ICD-10-CM

## 2020-09-08 DIAGNOSIS — I10 HYPERTENSION GOAL BP (BLOOD PRESSURE) < 140/90: ICD-10-CM

## 2020-09-08 DIAGNOSIS — I65.23 CAROTID STENOSIS, BILATERAL: ICD-10-CM

## 2020-09-08 DIAGNOSIS — M48.00 CENTRAL STENOSIS OF SPINAL CANAL: ICD-10-CM

## 2020-09-08 PROCEDURE — 99214 OFFICE O/P EST MOD 30 MIN: CPT | Performed by: PHYSICIAN ASSISTANT

## 2020-09-08 RX ORDER — ASPIRIN 81 MG/1
81 TABLET, CHEWABLE ORAL DAILY
Qty: 100 TABLET | Refills: 3 | Status: SHIPPED | OUTPATIENT
Start: 2020-09-08

## 2020-09-08 ASSESSMENT — PAIN SCALES - GENERAL: PAINLEVEL: MILD PAIN (2)

## 2020-09-14 ENCOUNTER — OFFICE VISIT (OUTPATIENT)
Dept: SURGERY | Facility: CLINIC | Age: 70
End: 2020-09-14
Payer: COMMERCIAL

## 2020-09-14 ENCOUNTER — TELEPHONE (OUTPATIENT)
Dept: OTHER | Facility: CLINIC | Age: 70
End: 2020-09-14

## 2020-09-14 VITALS
SYSTOLIC BLOOD PRESSURE: 114 MMHG | WEIGHT: 137.6 LBS | HEART RATE: 78 BPM | DIASTOLIC BLOOD PRESSURE: 73 MMHG | OXYGEN SATURATION: 100 % | BODY MASS INDEX: 21.54 KG/M2

## 2020-09-14 DIAGNOSIS — I65.22 SYMPTOMATIC STENOSIS OF LEFT CAROTID ARTERY: Primary | ICD-10-CM

## 2020-09-14 DIAGNOSIS — I67.1 RIGHT INTERNAL CAROTID ARTERY ANEURYSM: ICD-10-CM

## 2020-09-14 DIAGNOSIS — I65.23 CAROTID STENOSIS, BILATERAL: ICD-10-CM

## 2020-09-14 DIAGNOSIS — I10 HYPERTENSION GOAL BP (BLOOD PRESSURE) < 140/90: ICD-10-CM

## 2020-09-14 PROCEDURE — 99214 OFFICE O/P EST MOD 30 MIN: CPT | Performed by: SPECIALIST

## 2020-09-14 RX ORDER — CLOPIDOGREL BISULFATE 75 MG/1
75 TABLET ORAL DAILY
Qty: 30 TABLET | Refills: 3 | Status: ON HOLD | OUTPATIENT
Start: 2020-09-14 | End: 2020-10-08

## 2020-09-14 NOTE — PROGRESS NOTES
F/U for carotid stenenosis      Subjective:   Patient is a 69-year-old white female well-known to me for left breast cancer.  She was doing well up until about 2 weeks ago when she developed a severe headache.  She was seen in the ER and a CT of her head was normal however for some reason a CT angiogram was ordered of her neck that revealed an 80% left internal carotid artery stenosis.  She denies any true TIA, stroke, amaurosis fugax, family history of AAA or claudication.  She does not smoke.  However, she does report left eye vision changes over the past 6 months.  She does have an appointment with ophthalmology next week.  Her right eye remains normal.  She now presents to me for a left carotid stenosis.      Objective:  B/P: 114/73, T: Data Unavailable, P: 78, R: Data Unavailable  Neck: Supple, no JVD, no bruits    US -   BILATERAL CAROTID ULTRASOUND  1/14/2020 11:19 AM      HISTORY: Unsteadiness on feet. Hyperlipidemia. HTN (hypertension).     COMPARISON: None.     RIGHT CAROTID FINDINGS: Minimal plaque.  Right ICA PSV: 95 cm/sec.  Right ICA EDV: 35 cm/sec.  Right ICA/CCA PSV Ratio: 1.0   These indicate less than 50% diameter stenosis of the right ICA.   Right Vertebral: Antegrade flow.   Right ECA: Antegrade flow.      LEFT CAROTID FINDINGS: Moderate plaque.  Left ICA PSV: 129 cm/sec.  Left ICA EDV: 17 cm/sec.  Left ICA/CCA PSV Ratio: 1.9   These indicate 50-69% diameter stenosis of the left ICA.   Left Vertebral: Antegrade flow.   Left ECA: Antegrade flow.      Causes of Decreased Accuracy: None.                                                                       IMPRESSION:   1. Less than 50% diameter stenosis of the right ICA relative to the  distal ICA diameter.  2. 50-69% diameter stenosis of the left ICA relative to the distal ICA  diameter.     SYLVIA MARISCAL MD      CTA -  CT ANGIOGRAM OF THE HEAD AND NECK WITH CONTRAST  8/31/2020 1:27 PM      HISTORY: Severe headache, history of brain bleed.       TECHNIQUE:  CT angiography with an injection of 70mL, Isovue-370 IV  with scans through the head and neck. Images were transferred to a  separate 3-D workstation where multiplanar reformations and 3-D images  were created. Estimates of carotid stenoses are made relative to the  distal internal carotid artery diameters except as noted. Radiation  dose for this scan was reduced using automated exposure control,  adjustment of the mA and/or kV according to patient size, or iterative  reconstruction technique.     COMPARISON: Carotid Doppler ultrasound 1/14/2020      CT ANGIOGRAM HEAD FINDINGS:     The vertebral arteries, basilar artery, and posterior cerebral  arteries are patent. The internal carotid arteries, anterior cerebral  arteries, and middle cerebral arteries are patent. Moderate plaquing  is present involving the bilateral carotid siphons, left worse than  right. Fetal origin of the right posterior cerebral artery. A 2 mm  conically shaped outpouching is present at the right internal carotid  artery cavernous segment (series 7 image 436).     CT ANGIOGRAM NECK FINDINGS:   A three-vessel aortic arch is present. The bilateral common carotid,  internal carotid, external carotid, and vertebral arteries are patent.  Left internal carotid artery is asymmetrically small compared to the  right. Severe calcifications are present at the bilateral carotid  bifurcations. Approximately 40% stenosis of the proximal right  internal carotid artery. Approximately 70-80% stenosis of the proximal  left internal carotid artery at the bifurcation. Mild plaquing is  present at the bilateral vertebral artery origins.     Scattered dental disease is present. Periapical abscess is present  involving tooth #5. Mild cervical spine degenerative change is present  most conspicuous at C5-C6.                                                                       IMPRESSION:   CTA Head:   1. No evidence of large-vessel occlusion or  high-grade stenosis.  2. Left internal carotid artery is asymmetrically small compared to  the left.  3. Incidental 2 mm outpouching off the cavernous segment of the right  internal carotid artery which could represent infundibulum or  aneurysm. Follow-up could be performed in 6-12 months.   CTA Neck:   1. Severe plaquing at the bilateral carotid bifurcations.  2. Approximately 80% stenosis of the proximal left internal carotid  artery.   3. Approximately 40% stenosis of the proximal right internal carotid  artery.     NIDA SALAS MD    Assessment/plan:  This is a 69-year-old lady well-known to me for breast cancer who returns with what appears to be asymptomatic left internal carotid artery stenosis.  On CTA she does have an 80% stenosis at this time which does meet threshold for intervention as well as the possible symptomatology with left eye changes.  I discussed these findings with the patient she expressed understanding.  After discussion with the patient the plan at this time is to start her on Plavix, have her keep her ophthalmology appointment next week and refer her to [facility] vascular for further treatment.  She will follow-up with me as necessary.  She knows to go to the ER should stroke symptoms appear.    Keyon Blanco MD, FACS

## 2020-09-14 NOTE — TELEPHONE ENCOUNTER
Pt referred to [facility] by Keyon Blanco MD for Symptomatic stenosis of left carotid artery, Carotid stenosis, bilateral.     CTA head/neck 8/31/20 in Epic.    Pt needs to be scheduled for new pt in person consult with vascular surgery.  Will route to scheduling to coordinate an appointment sooner rather than later.     MARY GRACE GilN, RN  [facility]

## 2020-09-14 NOTE — LETTER
9/14/2020         RE: Michaela Dorman  [address]  [place] [address]        Dear Colleague,    Thank you for referring your patient, Michaela Dorman, to the [facility]. Please see a copy of my visit note below.    F/U for carotid stenenosis      Subjective:   Patient is a 69-year-old white female well-known to me for left breast cancer.  She was doing well up until about 2 weeks ago when she developed a severe headache.  She was seen in the ER and a CT of her head was normal however for some reason a CT angiogram was ordered of her neck that revealed an 80% left internal carotid artery stenosis.  She denies any true TIA, stroke, amaurosis fugax, family history of AAA or claudication.  She does not smoke.  However, she does report left eye vision changes over the past 6 months.  She does have an appointment with ophthalmology next week.  Her right eye remains normal.  She now presents to me for a left carotid stenosis.      Objective:  B/P: 114/73, T: Data Unavailable, P: 78, R: Data Unavailable  Neck: Supple, no JVD, no bruits    US -   BILATERAL CAROTID ULTRASOUND  1/14/2020 11:19 AM      HISTORY: Unsteadiness on feet. Hyperlipidemia. HTN (hypertension).     COMPARISON: None.     RIGHT CAROTID FINDINGS: Minimal plaque.  Right ICA PSV: 95 cm/sec.  Right ICA EDV: 35 cm/sec.  Right ICA/CCA PSV Ratio: 1.0   These indicate less than 50% diameter stenosis of the right ICA.   Right Vertebral: Antegrade flow.   Right ECA: Antegrade flow.      LEFT CAROTID FINDINGS: Moderate plaque.  Left ICA PSV: 129 cm/sec.  Left ICA EDV: 17 cm/sec.  Left ICA/CCA PSV Ratio: 1.9   These indicate 50-69% diameter stenosis of the left ICA.   Left Vertebral: Antegrade flow.   Left ECA: Antegrade flow.      Causes of Decreased Accuracy: None.                                                                       IMPRESSION:   1. Less than 50% diameter stenosis of the right ICA relative to the  distal ICA  diameter.  2. 50-69% diameter stenosis of the left ICA relative to the distal ICA  diameter.     SYLVIA MARISCAL MD      CTA -  CT ANGIOGRAM OF THE HEAD AND NECK WITH CONTRAST  8/31/2020 1:27 PM      HISTORY: Severe headache, history of brain bleed.      TECHNIQUE:  CT angiography with an injection of 70mL, Isovue-370 IV  with scans through the head and neck. Images were transferred to a  separate 3-D workstation where multiplanar reformations and 3-D images  were created. Estimates of carotid stenoses are made relative to the  distal internal carotid artery diameters except as noted. Radiation  dose for this scan was reduced using automated exposure control,  adjustment of the mA and/or kV according to patient size, or iterative  reconstruction technique.     COMPARISON: Carotid Doppler ultrasound 1/14/2020      CT ANGIOGRAM HEAD FINDINGS:     The vertebral arteries, basilar artery, and posterior cerebral  arteries are patent. The internal carotid arteries, anterior cerebral  arteries, and middle cerebral arteries are patent. Moderate plaquing  is present involving the bilateral carotid siphons, left worse than  right. Fetal origin of the right posterior cerebral artery. A 2 mm  conically shaped outpouching is present at the right internal carotid  artery cavernous segment (series 7 image 436).     CT ANGIOGRAM NECK FINDINGS:   A three-vessel aortic arch is present. The bilateral common carotid,  internal carotid, external carotid, and vertebral arteries are patent.  Left internal carotid artery is asymmetrically small compared to the  right. Severe calcifications are present at the bilateral carotid  bifurcations. Approximately 40% stenosis of the proximal right  internal carotid artery. Approximately 70-80% stenosis of the proximal  left internal carotid artery at the bifurcation. Mild plaquing is  present at the bilateral vertebral artery origins.     Scattered dental disease is present. Periapical abscess is  present  involving tooth #5. Mild cervical spine degenerative change is present  most conspicuous at C5-C6.                                                                       IMPRESSION:   CTA Head:   1. No evidence of large-vessel occlusion or high-grade stenosis.  2. Left internal carotid artery is asymmetrically small compared to  the left.  3. Incidental 2 mm outpouching off the cavernous segment of the right  internal carotid artery which could represent infundibulum or  aneurysm. Follow-up could be performed in 6-12 months.   CTA Neck:   1. Severe plaquing at the bilateral carotid bifurcations.  2. Approximately 80% stenosis of the proximal left internal carotid  artery.   3. Approximately 40% stenosis of the proximal right internal carotid  artery.     NIDA SALAS MD    Assessment/plan:  This is a 69-year-old lady well-known to me for breast cancer who returns with what appears to be asymptomatic left internal carotid artery stenosis.  On CTA she does have an 80% stenosis at this time which does meet threshold for intervention as well as the possible symptomatology with left eye changes.  I discussed these findings with the patient she expressed understanding.  After discussion with the patient the plan at this time is to start her on Plavix, have her keep her ophthalmology appointment next week and refer her to [facility] vascular for further treatment.  She will follow-up with me as necessary.  She knows to go to the ER should stroke symptoms appear.    Keyon Blanco MD, FACS    Again, thank you for allowing me to participate in the care of your patient.        Sincerely,        Keyon Blanco MD     Fluid/Electrolyte/Metabolic General

## 2020-09-15 NOTE — TELEPHONE ENCOUNTER
September 15, 2020    Patient is scheduled for a new patient consult appointment on 09/22/2020 with Dr. Jasmine at [facility] per [facility] provider availability.    Patient was offered alternate scheduling at [facility] due to location and distance from patient's residence but patient declined due to the time of the available appointment.     Rosaura Healy    [facility]  Office: [phone]  Fax [phone]

## 2020-09-16 NOTE — TELEPHONE ENCOUNTER
E-visit with  yesterday. Mallika Vera PA-C    
KP did Evisit yesterday.  Will route to her for review.    Larry Meadows, RN, BSN            
Reviewed the request for an alternative antibiotic--RN contact with the following:    Will prescribe Ceftin 250 mg twice daily for one week. Needing to have a urine specimen prior to starting anything new--most of her recent urine cultures were negative for bacteria growth.    Would wonder about atrophic urethritis as the cause for symptoms and infection. May want to evaluate for this with follow up.    Chito Schneider MD    
Spoke with patient. Informed her of provider message.  Will come in tomorrow am to [place] for lab only.    Larry Meadows, RN, BSN      
Number Of Freeze-Thaw Cycles: 2 freeze-thaw cycles
Post-Care Instructions: I reviewed with the patient in detail post-care instructions. Patient is to wear sunprotection, and avoid picking at any of the treated lesions. Pt may apply Vaseline to crusted or scabbing areas.
Render Note In Bullet Format When Appropriate: No
Consent: The patient's consent was obtained including but not limited to risks of crusting, scabbing, blistering, scarring, darker or lighter pigmentary change, recurrence, incomplete removal and infection.
Duration Of Freeze Thaw-Cycle (Seconds): 10
Detail Level: Detailed

## 2020-09-22 ENCOUNTER — OFFICE VISIT (OUTPATIENT)
Dept: OTHER | Facility: CLINIC | Age: 70
End: 2020-09-22
Attending: SURGERY
Payer: COMMERCIAL

## 2020-09-22 VITALS
BODY MASS INDEX: 21.69 KG/M2 | HEART RATE: 73 BPM | OXYGEN SATURATION: 96 % | HEIGHT: 66 IN | WEIGHT: 135 LBS | RESPIRATION RATE: 16 BRPM | SYSTOLIC BLOOD PRESSURE: 128 MMHG | DIASTOLIC BLOOD PRESSURE: 78 MMHG

## 2020-09-22 DIAGNOSIS — I65.23 CAROTID STENOSIS, BILATERAL: ICD-10-CM

## 2020-09-22 DIAGNOSIS — Z11.59 ENCOUNTER FOR SCREENING FOR OTHER VIRAL DISEASES: Primary | ICD-10-CM

## 2020-09-22 DIAGNOSIS — I65.22 SYMPTOMATIC STENOSIS OF LEFT CAROTID ARTERY: Primary | ICD-10-CM

## 2020-09-22 DIAGNOSIS — I65.22 ASYMPTOMATIC STENOSIS OF LEFT CAROTID ARTERY WITHOUT INFARCTION: Primary | ICD-10-CM

## 2020-09-22 PROCEDURE — G0463 HOSPITAL OUTPT CLINIC VISIT: HCPCS

## 2020-09-22 PROCEDURE — 99203 OFFICE O/P NEW LOW 30 MIN: CPT | Mod: ZP | Performed by: SURGERY

## 2020-09-22 RX ORDER — MULTIPLE VITAMINS W/ MINERALS TAB 9MG-400MCG
1 TAB ORAL DAILY
COMMUNITY
End: 2020-10-07

## 2020-09-22 ASSESSMENT — MIFFLIN-ST. JEOR: SCORE: 1149.11

## 2020-09-22 NOTE — PROGRESS NOTES
"Michaela Dorman is a 69 year old female who presents for:  Chief Complaint   Patient presents with     RECHECK     NEW: Ref by Dr. Blanco for symptomatic stenosis of left carotid artery; CTA head/neck 8/31/20 in Epic. NV        Vitals:    Vitals:    09/22/20 1432   BP: 128/78   BP Location: Right arm   Patient Position: Chair   Cuff Size: Adult Regular   Pulse: 73   Resp: 16   SpO2: 96%   Weight: 135 lb (61.2 kg)   Height: 5' 6\" (1.676 m)       BMI:  Estimated body mass index is 21.79 kg/m  as calculated from the following:    Height as of this encounter: 5' 6\" (1.676 m).    Weight as of this encounter: 135 lb (61.2 kg).    Pain Score:  Data Unavailable        Stefani Mello CMA    "

## 2020-09-22 NOTE — NURSING NOTE
Patient Education    Procedure: Left carotid endarterectomy with EEG  Diagnosis: Left carotid artery stenosis  Anticoagulation Instruction: hold Plavix x7 days, continue 81mg ASA   Pre-Operative Physical Exam: You need to have a pre-op physical exam within 30 days of your procedure. Your procedure may be cancelled if you do not have a current History and Physical. Call your PCP's office to schedule.  Allergies:  Updated in Epic  Bowel Prep: n/a  NPO per protocol.   Post Procedure Education: Vascular [facility] patient post-procedure fact sheet reviewed with patient.    COVID-19 instructions for isolation and pre testing reviewed with pt.    Learner(s):patient and significant other  Method: Listening, Reading  Barriers to Learning:No Barrier  Outcome: Patient did verbalize understanding of above education.    Iris Carlton, MARY GRACEN, RN-[facility]

## 2020-09-23 ENCOUNTER — TELEPHONE (OUTPATIENT)
Dept: OTHER | Facility: CLINIC | Age: 70
End: 2020-09-23

## 2020-09-23 NOTE — TELEPHONE ENCOUNTER
Type of surgery: LEFT CAROTID ENDARTERECTOMY WITH EEG  Location of surgery: [facility]  Date and time of surgery: 10/8/20 @ 7:30 AM  Surgeon: DR. AMBROSE  Pre-Op Appt Date: PT TO SCHEDULE  Post-Op Appt Date: PT TO SCHEDULE   Packet sent out: Yes  Pre-cert/Authorization completed:  Yes  Date: 9/23/20

## 2020-09-24 ASSESSMENT — ENCOUNTER SYMPTOMS
ALLERGIC/IMMUNOLOGIC NEGATIVE: 1
BACK PAIN: 1
RESPIRATORY NEGATIVE: 1
NEUROLOGICAL NEGATIVE: 1
HEMATOLOGIC/LYMPHATIC NEGATIVE: 1
DEPRESSION: 1
CARDIOVASCULAR NEGATIVE: 1
CONSTITUTIONAL NEGATIVE: 1
BLURRED VISION: 1
NECK PAIN: 1
GASTROINTESTINAL NEGATIVE: 1
ENDOCRINE NEGATIVE: 1

## 2020-09-24 NOTE — PROGRESS NOTES
[facility] INITIAL VASCULAR SURGERY CONSULT    Impression:   1.  80% left carotid stenosis-asymptomatic.    Plan:   I had a lengthy discussion with Michaela and her  regarding the natural history of asymptomatic carotid disease.  I do not believe that her declining left eye vision over the past 6 months is in any way related to her carotid stenosis.  I recommend left carotid endarterectomy with patch angioplasty.  I thoroughly described the specifics of that procedure including potential complications and the anticipated postoperative course.  I quoted my personal perioperative stroke rate of less than 1%.    All of their questions were answered and they verbalized full understanding to the above.  They adamantly wish to proceed with left carotid endarterectomy.  Pending preoperative medical clearance, surgery will be scheduled at [facility] in a timely manner.    HPI:   Michaela Dorman is a 69-year-old right-handed female evaluated about 1 month ago in an emergency room for severe headache.  CT scan of the brain was normal.  Incidentally noted was an 80% left internal carotid stenosis.  She has no personal history of stroke, TIA, or amaurosis.      CURRENT MEDICATIONS  acetaminophen (TYLENOL) 500 MG tablet, Take 1,000 mg by mouth 3 times daily as needed for mild pain (500MG X 2 = 1,000MG)  ALPRAZolam (XANAX) 0.5 MG tablet, TAKE ONE TABLET BY MOUTH AT BEDTIME AS NEEDED FOR SLEEP  aspirin (ASA) 81 MG chewable tablet, Take 1 tablet (81 mg) by mouth daily  atorvastatin (LIPITOR) 20 MG tablet, Take 1 tablet (20 mg) by mouth daily  celecoxib (CELEBREX) 200 MG capsule, Take 1 capsule (200 mg) by mouth 2 times daily  cholecalciferol (VITAMIN D3) 5000 units (125 mcg) capsule, Take 5,000 Units by mouth daily  clopidogrel (PLAVIX) 75 MG tablet, Take 1 tablet (75 mg) by mouth daily  diphenhydrAMINE (BENADRYL) 25 MG tablet, Take 25 mg by mouth nightly as needed for itching or  allergies  escitalopram (LEXAPRO) 20 MG tablet, Take 1.5 tablets (30 mg) by mouth daily  Garlic 1000 MG CAPS, Take 1,000 mg by mouth every morning  Lansoprazole (PREVACID PO), Take 40 mg by mouth every morning   MAGNESIUM PO, Take 380 mg by mouth daily  Methylcobalamin (METHYL B-12 PO), Take 5,000 Units by mouth daily  multivitamin w/minerals (MULTI-VITAMIN) tablet, Take 1 tablet by mouth daily  oxyCODONE-acetaminophen (PERCOCET) 5-325 MG tablet, Take 1 tablet by mouth every 6 hours as needed for severe pain  polyethylene glycol (MIRALAX/GLYCOLAX) powder, Take 17 g (1 capful) by mouth daily  prochlorperazine (COMPAZINE) 10 MG tablet, TAKE ONE TABLET BY MOUTH EVERY 6 HOURS AS NEEDED FOR NAUSEA / VOMITING  senna-docusate (SENOKOT-S/PERICOLACE) 8.6-50 MG tablet, Take 2 tablets by mouth 2 times daily  tamsulosin (FLOMAX) 0.4 MG capsule, Take 1 capsule (0.4 mg) by mouth At Bedtime  Zinc 30 MG CAPS, Take 30 mg by mouth daily    No current facility-administered medications on file prior to visit.         PAST MEDICAL HISTORY  Past Medical History:   Diagnosis Date     Carotid stenosis, bilateral L80%, R40% 9/2/2020     Central stenosis of spinal canal 12/1/2017    lumbar      DDD (degenerative disc disease), lumbar 12/1/2017     Depressive disorder, not elsewhere classified      Esophageal reflux      ETOH abuse     in remission     Foraminal stenosis of lumbar region 12/1/2017    Complete lumbar spine left at various degrees and to a lesser extent the right as well.     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     Subdural hematoma (H)          PAST SURGICAL HISTORY:  Past Surgical History:   Procedure Laterality Date     ARTHROPLASTY SHOULDER Right 11/17/2015     ARTHROSCOPY KNEE  6/7/2012    Procedure:ARTHROSCOPY KNEE; right knee arthroscopy with partial lateral  menisectomy; Surgeon:DAMIÁN MCALLISTER; Location:[facility] OR     C LIGATE FALLOPIAN TUBE       C NONSPECIFIC PROCEDURE  1956    ankle fracture surgery     COLONOSCOPY N/A 12/22/2017    Procedure: COLONOSCOPY;  colonoscopy;  Surgeon: Chuck Chand MD;  Location: [facility]     EXCISE MASS AXILLA Left 9/16/2016    Procedure: EXCISE MASS AXILLA;  Surgeon: Keyon Blanco MD;  Location: [facility] OR     HC REMV CATARACT EXTRACAP,INSERT LENS, W/O ECP  6/25/2009    Right eye     INJECT EPIDURAL LUMBAR N/A 4/11/2019    Procedure: interlaminar epidual steroid injection lumbar 4-5;  Surgeon: Oskar Salvador MD;  Location: [facility]     INJECT EPIDURAL LUMBAR Bilateral 9/12/2019    Procedure: lumbar 4-5 epidural interlaminar steroid injection;  Surgeon: Oskar Salvador MD;  Location: [facility] OR     INSERT PORT VASCULAR ACCESS Right 10/7/2016    Procedure: INSERT PORT VASCULAR ACCESS;  Surgeon: Keyon Blanco MD;  Location: [facility]     MASTECTOMY SIMPLE BILATERAL Bilateral 2/8/2017    Procedure: MASTECTOMY SIMPLE BILATERAL;  Surgeon: Keyon Blanco MD;  Location: [facility]     OPEN REDUCTION INTERNAL FIXATION FOOT Right 3/20/2015    Procedure: OPEN REDUCTION INTERNAL FIXATION FOOT;  Surgeon: Javi Herrmann DPM;  Location: [facility]     OPTICAL TRACKING SYSTEM FUSION SPINE POSTERIOR LUMBAR TWO LEVELS N/A 2/4/2020    Procedure: LUMBAR 2-SACRAL1 TRANSFORMINAL INTERBODY FUSION;  Surgeon: Lenny Gomes MD;  Location: [facility]     REMOVE PORT VASCULAR ACCESS Right 2/14/2018    Procedure: REMOVE PORT VASCULAR ACCESS;  right intra jugular port removal;  Surgeon: Keyon Blanco MD;  Location: [facility]     SHOULDER SURGERY Right 11/2015       ALLERGIES     Allergies   Allergen Reactions     No Known Drug Allergies        FAMILY HISTORY  Family History   Problem Relation Age of Onset     Hypertension Mother      Thyroid Disease Mother      Cerebrovascular Disease Mother      Hypertension Father      Cerebrovascular Disease Father      Cancer Father         " skin     Thyroid Disease Sister      Diabetes Brother         type 2     Depression Sister      Breast Cancer Sister         age 47     Cancer Sister         skin     Cancer Brother         inside nose       SOCIAL HISTORY  Social History     Tobacco Use     Smoking status: Former Smoker     Packs/day: 3.00     Years: 10.00     Pack years: 30.00     Types: Cigarettes     Last attempt to quit: 1979     Years since quittin.8     Smokeless tobacco: Never Used     Tobacco comment: approx. 3 packs daily   Substance Use Topics     Alcohol use: Yes     Alcohol/week: 0.0 standard drinks     Comment: daily glass of wine     Drug use: No       ROS:   Review of Systems   Constitution: Negative.   HENT: Negative.    Eyes: Positive for blurred vision.   Cardiovascular: Negative.    Respiratory: Negative.    Endocrine: Negative.    Hematologic/Lymphatic: Negative.    Skin: Negative.    Musculoskeletal: Positive for arthritis, back pain and neck pain.   Gastrointestinal: Negative.    Genitourinary: Negative.    Neurological: Negative.    Psychiatric/Behavioral: Positive for depression.   Allergic/Immunologic: Negative.          EXAM:  /78 (BP Location: Right arm, Patient Position: Chair, Cuff Size: Adult Regular)   Pulse 73   Resp 16   Ht 5' 6\" (1.676 m)   Wt 135 lb (61.2 kg)   SpO2 96%   BMI 21.79 kg/m    Physical Exam  Vitals signs and nursing note reviewed.   Constitutional:       Appearance: Normal appearance.   HENT:      Head: Normocephalic and atraumatic.   Eyes:      General: No scleral icterus.     Extraocular Movements: Extraocular movements intact.      Pupils: Pupils are equal, round, and reactive to light.   Neck:      Musculoskeletal: Normal range of motion. No neck rigidity.   Cardiovascular:      Rate and Rhythm: Normal rate and regular rhythm.      Pulses: Normal pulses.   Musculoskeletal: Normal range of motion.   Lymphadenopathy:      Cervical: No cervical adenopathy.   Skin:     General: " Skin is warm and dry.   Neurological:      General: No focal deficit present.      Mental Status: She is alert and oriented to person, place, and time. Mental status is at baseline.   Psychiatric:         Mood and Affect: Mood normal.         Behavior: Behavior normal.         Thought Content: Thought content normal.         Judgment: Judgment normal.         Labs:  LIPID RESULTS:  Lab Results   Component Value Date    CHOL 202 (H) 03/25/2019     03/25/2019    LDL 87 03/25/2019    TRIG 70 03/25/2019    CHOLHDLRATIO 3.0 08/02/2010       CBC RESULTS:  Lab Results   Component Value Date    WBC 8.1 08/31/2020    RBC 3.98 08/31/2020    HGB 13.1 08/31/2020    HCT 40.0 08/31/2020     (H) 08/31/2020    MCH 32.9 08/31/2020    MCHC 32.8 08/31/2020    RDW 14.2 08/31/2020     08/31/2020       BMP RESULTS:  Lab Results   Component Value Date     08/31/2020    POTASSIUM 4.3 08/31/2020    CHLORIDE 103 08/31/2020    CO2 28 08/31/2020    ANIONGAP 6 08/31/2020    GLC 91 08/31/2020    BUN 26 08/31/2020    CR 1.13 (H) 08/31/2020    GFRESTIMATED 49 (L) 08/31/2020    GFRESTBLACK 57 (L) 08/31/2020    VANDANA 10.4 (H) 08/31/2020        A1C RESULTS:  Lab Results   Component Value Date    A1C 5.0 01/24/2017         Imaging:  CT ANGIOGRAM OF THE HEAD AND NECK WITH CONTRAST  8/31/2020 1:27 PM      HISTORY: Severe headache, history of brain bleed.      TECHNIQUE:  CT angiography with an injection of 70mL, [id number]-[id number] IV  with scans through the head and neck. Images were transferred to a  separate 3-D workstation where multiplanar reformations and 3-D images  were created. Estimates of carotid stenoses are made relative to the  distal internal carotid artery diameters except as noted. Radiation  dose for this scan was reduced using automated exposure control,  adjustment of the mA and/or kV according to patient size, or iterative  reconstruction technique.     COMPARISON: Carotid Doppler ultrasound 1/14/2020      CT  ANGIOGRAM HEAD FINDINGS:     The vertebral arteries, basilar artery, and posterior cerebral  arteries are patent. The internal carotid arteries, anterior cerebral  arteries, and middle cerebral arteries are patent. Moderate plaquing  is present involving the bilateral carotid siphons, left worse than  right. Fetal origin of the right posterior cerebral artery. A 2 mm  conically shaped outpouching is present at the right internal carotid  artery cavernous segment (series 7 image [id number]).     CT ANGIOGRAM NECK FINDINGS:   A three-vessel aortic arch is present. The bilateral common carotid,  internal carotid, external carotid, and vertebral arteries are patent.  Left internal carotid artery is asymmetrically small compared to the  right. Severe calcifications are present at the bilateral carotid  bifurcations. Approximately 40% stenosis of the proximal right  internal carotid artery. Approximately 70-80% stenosis of the proximal  left internal carotid artery at the bifurcation. Mild plaquing is  present at the bilateral vertebral artery origins.     Scattered dental disease is present. Periapical abscess is present  involving tooth #5. Mild cervical spine degenerative change is present  most conspicuous at C5-C6.                                                                       IMPRESSION:   CTA Head:   1. No evidence of large-vessel occlusion or high-grade stenosis.  2. Left internal carotid artery is asymmetrically small compared to  the left.  3. Incidental 2 mm outpouching off the cavernous segment of the right  internal carotid artery which could represent infundibulum or  aneurysm. Follow-up could be performed in 6-12 months.   CTA Neck:   1. Severe plaquing at the bilateral carotid bifurcations.  2. Approximately 80% stenosis of the proximal left internal carotid  artery.   3. Approximately 40% stenosis of the proximal right internal carotid  artery.     NIDA SALAS MD      Total face to face time was 30  minutes, greater than 50% spent providing counseling and education.      Lacho Jasmine MD

## 2020-09-29 NOTE — PATIENT INSTRUCTIONS

## 2020-10-01 ENCOUNTER — OFFICE VISIT (OUTPATIENT)
Dept: FAMILY MEDICINE | Facility: OTHER | Age: 70
End: 2020-10-01
Payer: COMMERCIAL

## 2020-10-01 ENCOUNTER — TELEPHONE (OUTPATIENT)
Dept: FAMILY MEDICINE | Facility: OTHER | Age: 70
End: 2020-10-01

## 2020-10-01 VITALS
BODY MASS INDEX: 21.19 KG/M2 | HEIGHT: 67 IN | HEART RATE: 72 BPM | OXYGEN SATURATION: 97 % | SYSTOLIC BLOOD PRESSURE: 120 MMHG | TEMPERATURE: 98.9 F | RESPIRATION RATE: 16 BRPM | WEIGHT: 135 LBS | DIASTOLIC BLOOD PRESSURE: 72 MMHG

## 2020-10-01 DIAGNOSIS — N18.30 STAGE 3 CHRONIC KIDNEY DISEASE, UNSPECIFIED WHETHER STAGE 3A OR 3B CKD (H): ICD-10-CM

## 2020-10-01 DIAGNOSIS — I65.23 CAROTID ARTERY PLAQUE, BILATERAL: ICD-10-CM

## 2020-10-01 DIAGNOSIS — Z85.3 PERSONAL HISTORY OF MALIGNANT NEOPLASM OF BREAST: ICD-10-CM

## 2020-10-01 DIAGNOSIS — D64.9 ANEMIA, UNSPECIFIED TYPE: ICD-10-CM

## 2020-10-01 DIAGNOSIS — I65.23 CAROTID STENOSIS, BILATERAL: ICD-10-CM

## 2020-10-01 DIAGNOSIS — I67.1 RIGHT INTERNAL CAROTID ARTERY ANEURYSM: ICD-10-CM

## 2020-10-01 DIAGNOSIS — Z01.818 PREOP GENERAL PHYSICAL EXAM: ICD-10-CM

## 2020-10-01 DIAGNOSIS — N30.01 ACUTE CYSTITIS WITH HEMATURIA: Primary | ICD-10-CM

## 2020-10-01 DIAGNOSIS — M54.16 LUMBAR RADICULOPATHY: ICD-10-CM

## 2020-10-01 DIAGNOSIS — Z23 ENCOUNTER FOR IMMUNIZATION: ICD-10-CM

## 2020-10-01 DIAGNOSIS — Z23 NEED FOR PROPHYLACTIC VACCINATION AND INOCULATION AGAINST INFLUENZA: ICD-10-CM

## 2020-10-01 DIAGNOSIS — F43.21 ADJUSTMENT DISORDER WITH DEPRESSED MOOD: ICD-10-CM

## 2020-10-01 DIAGNOSIS — R82.90 NONSPECIFIC FINDING ON EXAMINATION OF URINE: ICD-10-CM

## 2020-10-01 DIAGNOSIS — I10 HYPERTENSION GOAL BP (BLOOD PRESSURE) < 140/90: ICD-10-CM

## 2020-10-01 DIAGNOSIS — Z23 NEED FOR VACCINATION: Primary | ICD-10-CM

## 2020-10-01 DIAGNOSIS — I65.22 SYMPTOMATIC STENOSIS OF LEFT CAROTID ARTERY: ICD-10-CM

## 2020-10-01 DIAGNOSIS — M51.369 DDD (DEGENERATIVE DISC DISEASE), LUMBAR: ICD-10-CM

## 2020-10-01 DIAGNOSIS — C77.5 SECONDARY AND UNSPECIFIED MALIGNANT NEOPLASM OF INTRAPELVIC LYMPH NODES (H): ICD-10-CM

## 2020-10-01 LAB
ALBUMIN SERPL-MCNC: 4.3 G/DL (ref 3.4–5)
ALBUMIN UR-MCNC: NEGATIVE MG/DL
ALP SERPL-CCNC: 61 U/L (ref 40–150)
ALT SERPL W P-5'-P-CCNC: 49 U/L (ref 0–50)
ANION GAP SERPL CALCULATED.3IONS-SCNC: 6 MMOL/L (ref 3–14)
APPEARANCE UR: CLEAR
AST SERPL W P-5'-P-CCNC: 50 U/L (ref 0–45)
BACTERIA #/AREA URNS HPF: ABNORMAL /HPF
BASOPHILS # BLD AUTO: 0 10E9/L (ref 0–0.2)
BASOPHILS NFR BLD AUTO: 0.3 %
BILIRUB SERPL-MCNC: 0.3 MG/DL (ref 0.2–1.3)
BILIRUB UR QL STRIP: NEGATIVE
BUN SERPL-MCNC: 26 MG/DL (ref 7–30)
CALCIUM SERPL-MCNC: 9.8 MG/DL (ref 8.5–10.1)
CHLORIDE SERPL-SCNC: 103 MMOL/L (ref 94–109)
CO2 SERPL-SCNC: 28 MMOL/L (ref 20–32)
COLOR UR AUTO: YELLOW
CREAT SERPL-MCNC: 1.13 MG/DL (ref 0.52–1.04)
DIFFERENTIAL METHOD BLD: ABNORMAL
EOSINOPHIL # BLD AUTO: 0.3 10E9/L (ref 0–0.7)
EOSINOPHIL NFR BLD AUTO: 3.3 %
ERYTHROCYTE [DISTWIDTH] IN BLOOD BY AUTOMATED COUNT: 14.7 % (ref 10–15)
GFR SERPL CREATININE-BSD FRML MDRD: 49 ML/MIN/{1.73_M2}
GLUCOSE SERPL-MCNC: 91 MG/DL (ref 70–99)
GLUCOSE UR STRIP-MCNC: NEGATIVE MG/DL
HCT VFR BLD AUTO: 35 % (ref 35–47)
HGB BLD-MCNC: 11.3 G/DL (ref 11.7–15.7)
HGB UR QL STRIP: ABNORMAL
INR BLD: 0.9 (ref 0.86–1.14)
KETONES UR STRIP-MCNC: NEGATIVE MG/DL
LEUKOCYTE ESTERASE UR QL STRIP: ABNORMAL
LYMPHOCYTES # BLD AUTO: 1.8 10E9/L (ref 0.8–5.3)
LYMPHOCYTES NFR BLD AUTO: 19.5 %
MCH RBC QN AUTO: 32.5 PG (ref 26.5–33)
MCHC RBC AUTO-ENTMCNC: 32.3 G/DL (ref 31.5–36.5)
MCV RBC AUTO: 101 FL (ref 78–100)
MONOCYTES # BLD AUTO: 0.7 10E9/L (ref 0–1.3)
MONOCYTES NFR BLD AUTO: 7.6 %
NEUTROPHILS # BLD AUTO: 6.4 10E9/L (ref 1.6–8.3)
NEUTROPHILS NFR BLD AUTO: 69.3 %
NITRATE UR QL: POSITIVE
NON-SQ EPI CELLS #/AREA URNS LPF: ABNORMAL /LPF
PH UR STRIP: 6.5 PH (ref 5–7)
PLATELET # BLD AUTO: 269 10E9/L (ref 150–450)
POTASSIUM SERPL-SCNC: 5.1 MMOL/L (ref 3.4–5.3)
PROT SERPL-MCNC: 8 G/DL (ref 6.8–8.8)
RBC # BLD AUTO: 3.48 10E12/L (ref 3.8–5.2)
RBC #/AREA URNS AUTO: ABNORMAL /HPF
SODIUM SERPL-SCNC: 137 MMOL/L (ref 133–144)
SOURCE: ABNORMAL
SP GR UR STRIP: 1.01 (ref 1–1.03)
UROBILINOGEN UR STRIP-ACNC: 0.2 EU/DL (ref 0.2–1)
WBC # BLD AUTO: 9.2 10E9/L (ref 4–11)
WBC #/AREA URNS AUTO: ABNORMAL /HPF

## 2020-10-01 PROCEDURE — 85610 PROTHROMBIN TIME: CPT | Performed by: PHYSICIAN ASSISTANT

## 2020-10-01 PROCEDURE — 80053 COMPREHEN METABOLIC PANEL: CPT | Performed by: PHYSICIAN ASSISTANT

## 2020-10-01 PROCEDURE — 81001 URINALYSIS AUTO W/SCOPE: CPT | Performed by: PHYSICIAN ASSISTANT

## 2020-10-01 PROCEDURE — 36415 COLL VENOUS BLD VENIPUNCTURE: CPT | Performed by: PHYSICIAN ASSISTANT

## 2020-10-01 PROCEDURE — 85025 COMPLETE CBC W/AUTO DIFF WBC: CPT | Performed by: PHYSICIAN ASSISTANT

## 2020-10-01 PROCEDURE — 90662 IIV NO PRSV INCREASED AG IM: CPT | Performed by: PHYSICIAN ASSISTANT

## 2020-10-01 PROCEDURE — G0008 ADMIN INFLUENZA VIRUS VAC: HCPCS | Performed by: PHYSICIAN ASSISTANT

## 2020-10-01 PROCEDURE — 99215 OFFICE O/P EST HI 40 MIN: CPT | Mod: 25 | Performed by: PHYSICIAN ASSISTANT

## 2020-10-01 PROCEDURE — 87086 URINE CULTURE/COLONY COUNT: CPT | Performed by: PHYSICIAN ASSISTANT

## 2020-10-01 RX ORDER — CIPROFLOXACIN 500 MG/1
500 TABLET, FILM COATED ORAL 2 TIMES DAILY
Qty: 20 TABLET | Refills: 0 | Status: ON HOLD | OUTPATIENT
Start: 2020-10-01 | End: 2020-10-08

## 2020-10-01 ASSESSMENT — MIFFLIN-ST. JEOR: SCORE: 1157.05

## 2020-10-01 NOTE — PROGRESS NOTES
[address]  [facility] [address]  Phone: [phone]  Primary Provider: Phani Tapia  Pre-op Performing Provider: PHANI TAPIA    PREOPERATIVE EVALUATION:  Today's date: 10/1/2020    Michaela Dorman is a 69 year old female who presents for a preoperative evaluation.    Surgical Information:  Surgery Details 10/1/2020   Surgery/Procedure: Norma did Artery in neck cleaned out   Surgery Location: [facility]   Surgeon: Dr. Jasmine   Surgery Date: 10/8   Time of Surgery: 730   Where patient plans to recover: At home with family     Fax number for surgical facility: Note does not need to be faxed, will be available electronically in Epic.  Type of Anesthesia Anticipated: General    Subjective     HPI related to upcoming procedure: Corotid artery intervnetion  Preop Questions 10/1/2020   1. Have you ever had a heart attack or stroke? No   2. Have you ever had surgery on your heart or blood vessels, such as a stent placement, a coronary artery bypass, or surgery on an artery in your head, neck, heart, or legs? No   3. Do you have chest pain with activity? No   4. Do you have a history of  heart failure? No   5. Do you currently have a cold, bronchitis or symptoms of other infection? No   6. Do you have a cough, shortness of breath, or wheezing? No   7. Do you or anyone in your family have previous history of blood clots? No   8. Do you or does anyone in your family have a serious bleeding problem such as prolonged bleeding following surgeries or cuts? No   9. Have you ever had problems with anemia or been told to take iron pills? YES -    10. Have you had any abnormal blood loss such as black, tarry or bloody stools, or abnormal vaginal bleeding? No   11. Have you ever had a blood transfusion? YES -    11a. Have you ever had a transfusion reaction? No   Are you willing to have a blood transfusion if it is medically needed before, during, or after your  surgery? Yes   13. Have you or any of your relatives ever had problems with anesthesia? No   14. Do you have sleep apnea, excessive snoring or daytime drowsiness? No   15. Do you have any artifical heart valves or other implanted medical devices like a pacemaker, defibrillator, or continuous glucose monitor? No   16. Do you have artificial joints? YES -    17. Are you allergic to latex? No   18. Is there any chance that you may be pregnant? No     Patient does not have a Health Care Directive or Living Will: Patient states has Advance Directive and will bring in a copy to clinic.    RX monitoring [place] ([place] reviewed:     See problem list for active medical problems.  Problems all longstanding and stable, except as noted/documented.  See ROS for pertinent symptoms related to these conditions.      Review of Systems  CONSTITUTIONAL: NEGATIVE for fever, chills, change in weight  INTEGUMENTARY/SKIN: NEGATIVE for worrisome rashes, moles or lesions  EYES: NEGATIVE for vision changes or irritation  ENT/MOUTH: NEGATIVE for ear, mouth and throat problems  RESP: NEGATIVE for significant cough or SOB  CV: NEGATIVE for chest pain, palpitations or peripheral edema  GI: NEGATIVE for nausea, abdominal pain, heartburn, or change in bowel habits  : NEGATIVE for frequency, dysuria, or hematuria  MUSCULOSKELETAL: NEGATIVE for significant arthralgias or myalgia  NEURO: NEGATIVE for weakness, dizziness or paresthesias  ENDOCRINE: NEGATIVE for temperature intolerance, skin/hair changes  HEME: NEGATIVE for bleeding problems  PSYCHIATRIC: NEGATIVE for changes in mood or affect     Past Medical History:   Diagnosis Date     Carotid stenosis, bilateral L80%, R40% 9/2/2020     Central stenosis of spinal canal 12/1/2017    lumbar      DDD (degenerative disc disease), lumbar 12/1/2017     Depressive disorder, not elsewhere classified      Esophageal reflux      ETOH abuse     in remission     Foraminal stenosis of lumbar region 12/1/2017     Complete lumbar spine left at various degrees and to a lesser extent the right as well.     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     Subdural hematoma (H)      Past Surgical History:   Procedure Laterality Date     ARTHROPLASTY SHOULDER Right 11/17/2015     ARTHROSCOPY KNEE  6/7/2012    Procedure:ARTHROSCOPY KNEE; right knee arthroscopy with partial lateral menisectomy; Surgeon:DAMIÁN MCALLISTER; Location:[facility] OR     C LIGATE FALLOPIAN TUBE       C NONSPECIFIC PROCEDURE  1956    ankle fracture surgery     COLONOSCOPY N/A 12/22/2017    Procedure: COLONOSCOPY;  colonoscopy;  Surgeon: Chuck Chand MD;  Location: [facility]     EXCISE MASS AXILLA Left 9/16/2016    Procedure: EXCISE MASS AXILLA;  Surgeon: Keyon Blanco MD;  Location: [facility] OR     HC REMV CATARACT EXTRACAP,INSERT LENS, W/O ECP  6/25/2009    Right eye     INJECT EPIDURAL LUMBAR N/A 4/11/2019    Procedure: interlaminar epidual steroid injection lumbar 4-5;  Surgeon: Oskar Salvador MD;  Location: [facility]     INJECT EPIDURAL LUMBAR Bilateral 9/12/2019    Procedure: lumbar 4-5 epidural interlaminar steroid injection;  Surgeon: Oskar Salvador MD;  Location[facility]     INSERT PORT VASCULAR ACCESS Right 10/7/2016    Procedure: INSERT PORT VASCULAR ACCESS;  Surgeon: Keyon Blanco MD;  [facility]     MASTECTOMY SIMPLE BILATERAL Bilateral 2/8/2017    Procedure: MASTECTOMY SIMPLE BILATERAL;  Surgeon: Keyon Blanco MD;  [facility]     OPEN REDUCTION INTERNAL FIXATION FOOT Right 3/20/2015    Procedure: OPEN REDUCTION INTERNAL FIXATION FOOT;  Surgeon: Javi Herrmann DPM;  Location: [facility]     OPTICAL TRACKING SYSTEM FUSION SPINE POSTERIOR LUMBAR TWO LEVELS N/A 2/4/2020    Procedure: LUMBAR 2-SACRAL1 TRANSFORMINAL INTERBODY FUSION;  Surgeon: Lenny Gomes MD;  Location: [facility]     REMOVE PORT  VASCULAR ACCESS Right 2/14/2018    Procedure: REMOVE PORT VASCULAR ACCESS;  right intra jugular port removal;  Surgeon: Keyon Blanco MD;  Location: [facility]     SHOULDER SURGERY Right 11/2015     Current Outpatient Medications   Medication Sig Dispense Refill     acetaminophen (TYLENOL) 500 MG tablet Take 1,000 mg by mouth 3 times daily as needed for mild pain (500MG X 2 = 1,000MG)       ALPRAZolam (XANAX) 0.5 MG tablet TAKE ONE TABLET BY MOUTH AT BEDTIME AS NEEDED FOR SLEEP 30 tablet 0     aspirin (ASA) 81 MG chewable tablet Take 1 tablet (81 mg) by mouth daily 100 tablet 3     atorvastatin (LIPITOR) 20 MG tablet Take 1 tablet (20 mg) by mouth daily 90 tablet 1     celecoxib (CELEBREX) 200 MG capsule Take 1 capsule (200 mg) by mouth 2 times daily 180 capsule 1     cholecalciferol (VITAMIN D3) 5000 units (125 mcg) capsule Take 5,000 Units by mouth daily       clopidogrel (PLAVIX) 75 MG tablet Take 1 tablet (75 mg) by mouth daily 30 tablet 3     diphenhydrAMINE (BENADRYL) 25 MG tablet Take 25 mg by mouth nightly as needed for itching or allergies       escitalopram (LEXAPRO) 20 MG tablet Take 1.5 tablets (30 mg) by mouth daily 135 tablet 1     Garlic 1000 MG CAPS Take 1,000 mg by mouth every morning       Lansoprazole (PREVACID PO) Take 40 mg by mouth every morning        MAGNESIUM PO Take 380 mg by mouth daily       Methylcobalamin (METHYL B-12 PO) Take 5,000 Units by mouth daily       multivitamin w/minerals (MULTI-VITAMIN) tablet Take 1 tablet by mouth daily       oxyCODONE-acetaminophen (PERCOCET) 5-325 MG tablet Take 1 tablet by mouth every 6 hours as needed for severe pain 158 tablet 0     polyethylene glycol (MIRALAX/GLYCOLAX) powder Take 17 g (1 capful) by mouth daily       prochlorperazine (COMPAZINE) 10 MG tablet TAKE ONE TABLET BY MOUTH EVERY 6 HOURS AS NEEDED FOR NAUSEA / VOMITING 30 tablet 0     senna-docusate (SENOKOT-S/PERICOLACE) 8.6-50 MG tablet Take 2 tablets by mouth 2 times daily 60 tablet 0      "tamsulosin (FLOMAX) 0.4 MG capsule Take 1 capsule (0.4 mg) by mouth At Bedtime 90 capsule 1     Zinc 30 MG CAPS Take 30 mg by mouth daily         Allergies   Allergen Reactions     No Known Drug Allergies         Social History     Tobacco Use     Smoking status: Former Smoker     Packs/day: 3.00     Years: 10.00     Pack years: 30.00     Types: Cigarettes     Quit date: 1979     Years since quittin.8     Smokeless tobacco: Never Used     Tobacco comment: approx. 3 packs daily   Substance Use Topics     Alcohol use: Yes     Alcohol/week: 0.0 standard drinks     Comment: daily glass of wine     Family History   Problem Relation Age of Onset     Hypertension Mother      Thyroid Disease Mother      Cerebrovascular Disease Mother      Hypertension Father      Cerebrovascular Disease Father      Cancer Father         skin     Thyroid Disease Sister      Diabetes Brother         type 2     Depression Sister      Breast Cancer Sister         age 47     Cancer Sister         skin     Cancer Brother         inside nose     History   Drug Use No            Objective   /72 (BP Location: Right arm, Patient Position: Chair, Cuff Size: Adult Regular)   Pulse 72   Temp 98.9  F (37.2  C) (Temporal)   Resp 16   Ht 1.689 m (5' 6.5\")   Wt 61.2 kg (135 lb)   SpO2 97%   Breastfeeding No   BMI 21.46 kg/m    Physical Exam    GENERAL APPEARANCE: healthy, alert and no distress     EYES: EOMI, PERRL     HENT: ear canals and TM's normal and nose and mouth without ulcers or lesions     NECK: no adenopathy and trachea midline and normal to palpation     RESP: lungs clear to auscultation - no rales, rhonchi or wheezes     CV: regular rates and rhythm, normal S1 S2, no S3 or S4 and no murmur, click or rub     ABDOMEN:  soft, nontender, no HSM or masses and bowel sounds normal     MS: extremities normal- no gross deformities noted, no evidence of inflammation in joints, FROM in all extremities.     SKIN: no suspicious " lesions or rashes     NEURO: Normal strength and tone, sensory exam grossly normal, mentation intact and speech normal     PSYCH: mentation appears normal. and affect normal/bright     LYMPHATICS: No cervical adenopathy    Recent Labs   Lab Test 08/31/20  1241 07/16/20  1051 07/14/20  1237   HGB 13.1 11.5* 11.0*    284 268     --  137   POTASSIUM 4.3  --  4.3   CR 1.13*  --  1.19*        PRE-OP Diagnostics:  Labs pending at this time.  Results will be reviewed when available.  No EKG required, no history of coronary heart disease, significant arrhythmia, peripheral arterial disease or other structural heart disease.         Assessment & Plan   The proposed surgical procedure is considered INTERMEDIATE risk.    REVISED CARDIAC RISK INDEX  The patient has the following serious cardiovascular risks for perioperative complications:  No serious cardiac risks = 0 points    INTERPRETATION: 1 point: Class II (low risk - 0.9% complication rate)       1. Need for prophylactic vaccination and inoculation against influenza  - CBC with platelets and differential  - Comprehensive metabolic panel  - INR point of care  - *UA reflex to Microscopic and Culture ([facility] (except [place] and [place])    2. Preop general physical exam  Screening labs to assure patient stability today.  - CBC with platelets and differential  - Comprehensive metabolic panel  - INR point of care  - *UA reflex to Microscopic and Culture ([facility] (except [place] and [place])  - FLU VACCINE, 3 YRS +, IM (FLUZONE)  - ADMIN INFLUENZA VIRUS VAC    3. Need for vaccination  - CBC with platelets and differential  - Comprehensive metabolic panel  - INR point of care  - *UA reflex to Microscopic and Culture ([facility] (except [place] and [place])    4. Lumbar radiculopathy  5. Carotid artery plaque, bilateral  6. Carotid stenosis, bilateral L80%, R40%  7. Hypertension goal BP (blood  pressure) < 140/90  8. Right internal carotid artery aneurysm  9. Symptomatic stenosis of left carotid artery  10. DDD (degenerative disc disease), lumbar  11. Stage 3 chronic kidney disease, unspecified whether stage 3a or 3b CKD  12. Anemia, unspecified type  13. Adjustment disorder with depressed mood  14. Personal history of malignant neoplasm of breast  15. 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  Some of these of course are the reason back pain surgical intervention.  - INR point of care  - *UA reflex to Microscopic and Culture ([facility] (except [place] and [place])    16. Encounter for immunization   Completed today.  - FLU VACCINE, 3 YRS +, IM (FLUZONE)  - ADMIN INFLUENZA VIRUS VAC    The patient has the following additional risks and recommendations for perioperative complications:    PULMONARY:    - Incentive spirometry post-op   - Consider Respiratory Therapy (Respiratory Care IP Consult) post-op    ANEMIA/BLEEDING/CLOTTING:    - evaluating labs at this time     MEDICATION INSTRUCTIONS:  Patient is to take all scheduled medications on the day of surgery EXCEPT for modifications listed below:    Anticoagulant or Antiplatelet Medication Use   - Bleeding risk is low for this procedure (e.g. dental, skin, cataract).   - CLOPIDROGEL (PLAVIX), PRASUGREL (EFFIENT), TICAGRELOR (BRILINTA): No contraindication to stopping Plavix, HOLD 5-7 days before surgery.    She has been advised by her surgeon to stop her aspirin and Plavix for surgery.  I advised her to stop her Celebrex 3 days ahead of surgery as well.    She will be treated for urinary tract infection today with ciprofloxacin.  Follow-up PRN.    RECOMMENDATION:  APPROVAL GIVEN to proceed with proposed procedure, without further diagnostic evaluation.    Return in about 3 weeks (around 10/22/2020) for recheck of current  condition, if symptoms do not improve.    Signed Electronically by: BETHANY Howell PA-C    Copy of this evaluation report is provided to requesting physician.    Preop [facility] Preop Guidelines    Revised Cardiac Risk Index

## 2020-10-01 NOTE — TELEPHONE ENCOUNTER
Reason for Call:  Other returning call    Detailed comments: Please call Michaela back she is returning your call    Phone Number Patient can be reached at: Home number on file [phone] (home)    Best Time: any    Can we leave a detailed message on this number? YES    Call taken on 10/1/2020 at 1:03 PM by Nesha Romero

## 2020-10-02 LAB
BACTERIA SPEC CULT: NORMAL
Lab: NORMAL
SPECIMEN SOURCE: NORMAL

## 2020-10-04 DIAGNOSIS — Z11.59 ENCOUNTER FOR SCREENING FOR OTHER VIRAL DISEASES: ICD-10-CM

## 2020-10-04 PROCEDURE — U0003 INFECTIOUS AGENT DETECTION BY NUCLEIC ACID (DNA OR RNA); SEVERE ACUTE RESPIRATORY SYNDROME CORONAVIRUS 2 (SARS-COV-2) (CORONAVIRUS DISEASE [COVID-19]), AMPLIFIED PROBE TECHNIQUE, MAKING USE OF HIGH THROUGHPUT TECHNOLOGIES AS DESCRIBED BY CMS-2020-01-R: HCPCS | Performed by: SURGERY

## 2020-10-05 LAB
SARS-COV-2 RNA SPEC QL NAA+PROBE: NOT DETECTED
SPECIMEN SOURCE: NORMAL

## 2020-10-07 ENCOUNTER — ANESTHESIA EVENT (OUTPATIENT)
Dept: SURGERY | Facility: CLINIC | Age: 70
DRG: 038 | End: 2020-10-07
Payer: MEDICARE

## 2020-10-07 RX ORDER — ESCITALOPRAM OXALATE 20 MG/1
20 TABLET ORAL EVERY MORNING
COMMUNITY
End: 2020-12-18

## 2020-10-07 RX ORDER — MULTIVIT WITH MINERALS/LUTEIN
1000 TABLET ORAL EVERY MORNING
COMMUNITY
End: 2021-05-03

## 2020-10-07 RX ORDER — ESCITALOPRAM OXALATE 10 MG/1
10 TABLET ORAL EVERY MORNING
COMMUNITY
End: 2020-12-18

## 2020-10-07 RX ORDER — FERROUS SULFATE 325(65) MG
325 TABLET ORAL EVERY MORNING
COMMUNITY
End: 2021-02-08

## 2020-10-07 RX ORDER — BACLOFEN 20 MG
500 TABLET ORAL EVERY MORNING
COMMUNITY
End: 2021-05-03

## 2020-10-07 ASSESSMENT — LIFESTYLE VARIABLES: TOBACCO_USE: 1

## 2020-10-07 NOTE — PROGRESS NOTES
PTA medications updated by Medication Scribe prior to surgery via phone call with patient      -LAST DOSES ENTERED BY NURSE-    Comments: Patient likes to go camping with her horses and grandchildren    Medication history sources: Patient, Surescripts and H&P  Medication history source reliability: Good  Adherence assessment: N/A Not Observed    Significant changes made to the medication list:  None      Additional medication history information:   Patient brought own home meds: ciprofloxacin        Prior to Admission medications    Medication Sig Last Dose Taking? Auth Provider   acetaminophen (TYLENOL) 500 MG tablet Take 1,000 mg by mouth 3 times daily as needed for mild pain (500MG X 2 = 1,000MG)  at AM Yes Reported, Patient   ALPRAZolam (XANAX) 0.5 MG tablet TAKE ONE TABLET BY MOUTH AT BEDTIME AS NEEDED FOR SLEEP  at PRN Yes Phani Tapia PA-C   aspirin (ASA) 81 MG chewable tablet Take 1 tablet (81 mg) by mouth daily  at AM Yes Phani Tapia PA-C   atorvastatin (LIPITOR) 20 MG tablet Take 1 tablet (20 mg) by mouth daily  at AM Yes Phani Tapia PA-C   B-12 METHYLCOBALAMIN PO Take 5,000 mcg by mouth every morning  at AM Yes Reported, Patient   celecoxib (CELEBREX) 200 MG capsule Take 1 capsule (200 mg) by mouth 2 times daily  at AM Yes Phani Tapia PA-C   cholecalciferol (VITAMIN D3) 5000 units (125 mcg) capsule Take 5,000 Units by mouth every morning   at AM Yes Reported, Patient   ciprofloxacin (CIPRO) 500 MG tablet Take 1 tablet (500 mg) by mouth 2 times daily  at AM Yes Phani Tapia PA-C   clopidogrel (PLAVIX) 75 MG tablet Take 1 tablet (75 mg) by mouth daily 9/29/2020 at AM Yes Keyon Blanco MD   diphenhydrAMINE (BENADRYL) 25 MG tablet Take 25 mg by mouth nightly as needed for itching or allergies  at PRN Yes Reported, Patient   escitalopram (LEXAPRO) 10 MG tablet Take 10 mg by mouth every morning (in addition to a 20 mg dose to = 30 mg daily dose)   at AM Yes Reported, Patient   escitalopram  (LEXAPRO) 20 MG tablet Take 20 mg by mouth every morning (in addition to a 10 mg dose to = 30 mg daily dose)  at AM Yes Reported, Patient   ferrous sulfate (FEROSUL) 325 (65 Fe) MG tablet Take 325 mg by mouth every morning  at AM Yes Reported, Patient   Garlic 1000 MG CAPS Take 1,000 mg by mouth every morning  at AM Yes Reported, Patient   Lansoprazole (PREVACID PO) Take 40 mg by mouth every morning   at AM Yes Reported, Patient   Magnesium Oxide 500 MG TABS Take 500 mg by mouth every morning  at AM Yes Reported, Patient   oxyCODONE-acetaminophen (PERCOCET) 5-325 MG tablet Take 1 tablet by mouth every 6 hours as needed for severe pain  at PRN Yes Phani Tapia PA-C   polyethylene glycol (MIRALAX/GLYCOLAX) powder Take 1 capful by mouth every morning   at AM Yes Val Khan MD   prochlorperazine (COMPAZINE) 10 MG tablet TAKE ONE TABLET BY MOUTH EVERY 6 HOURS AS NEEDED FOR NAUSEA / VOMITING  at AM Yes Phani Tapia PA-C   Psyllium (METAMUCIL FIBER PO) Take 2 teaspoonful by mouth every morning (mix in with liquid and drink)  at AM Yes Reported, Patient   senna-docusate (SENOKOT-S/PERICOLACE) 8.6-50 MG tablet Take 2 tablets by mouth 2 times daily  Patient taking differently: Take 2 tablets by mouth 2 times daily as needed for constipation   at HS Yes Val Khan MD   tamsulosin (FLOMAX) 0.4 MG capsule Take 1 capsule (0.4 mg) by mouth At Bedtime  at HS Yes Phani Tapia PA-C   vitamin C (ASCORBIC ACID) 1000 MG TABS Take 1,000 mg by mouth every morning  at AM Yes Reported, Patient   Zinc 30 MG CAPS Take 30 mg by mouth every morning   at AM Yes Reported, Patient

## 2020-10-08 ENCOUNTER — APPOINTMENT (OUTPATIENT)
Dept: SURGERY | Facility: PHYSICIAN GROUP | Age: 70
End: 2020-10-08
Payer: COMMERCIAL

## 2020-10-08 ENCOUNTER — ANESTHESIA (OUTPATIENT)
Dept: SURGERY | Facility: CLINIC | Age: 70
DRG: 038 | End: 2020-10-08
Payer: MEDICARE

## 2020-10-08 ENCOUNTER — HOSPITAL ENCOUNTER (INPATIENT)
Facility: CLINIC | Age: 70
LOS: 1 days | Discharge: HOME | DRG: 038 | End: 2020-10-09
Attending: SURGERY | Admitting: SURGERY
Payer: MEDICARE

## 2020-10-08 DIAGNOSIS — I65.22 SYMPTOMATIC STENOSIS OF LEFT CAROTID ARTERY: ICD-10-CM

## 2020-10-08 LAB
ABO + RH BLD: NORMAL
ABO + RH BLD: NORMAL
ANION GAP SERPL CALCULATED.3IONS-SCNC: 5 MMOL/L (ref 3–14)
BLD GP AB SCN SERPL QL: NORMAL
BLOOD BANK CMNT PATIENT-IMP: NORMAL
BUN SERPL-MCNC: 21 MG/DL (ref 7–30)
CALCIUM SERPL-MCNC: 9.4 MG/DL (ref 8.5–10.1)
CHLORIDE SERPL-SCNC: 105 MMOL/L (ref 94–109)
CHOLEST SERPL-MCNC: 199 MG/DL
CO2 SERPL-SCNC: 29 MMOL/L (ref 20–32)
CREAT SERPL-MCNC: 0.89 MG/DL (ref 0.52–1.04)
GFR SERPL CREATININE-BSD FRML MDRD: 65 ML/MIN/{1.73_M2}
GLUCOSE SERPL-MCNC: 86 MG/DL (ref 70–99)
HBA1C MFR BLD: 5.2 % (ref 0–5.6)
HDLC SERPL-MCNC: 76 MG/DL
LDLC SERPL CALC-MCNC: 87 MG/DL
NONHDLC SERPL-MCNC: 123 MG/DL
POTASSIUM SERPL-SCNC: 3.5 MMOL/L (ref 3.4–5.3)
SODIUM SERPL-SCNC: 139 MMOL/L (ref 133–144)
SPECIMEN EXP DATE BLD: NORMAL
TRIGL SERPL-MCNC: 180 MG/DL

## 2020-10-08 PROCEDURE — 272N000282 HC OR IOM SUPPLIES OPNP: Performed by: SURGERY

## 2020-10-08 PROCEDURE — 258N000003 HC RX IP 258 OP 636: Performed by: ANESTHESIOLOGY

## 2020-10-08 PROCEDURE — 83036 HEMOGLOBIN GLYCOSYLATED A1C: CPT | Performed by: SURGERY

## 2020-10-08 PROCEDURE — 93010 ELECTROCARDIOGRAM REPORT: CPT | Performed by: INTERNAL MEDICINE

## 2020-10-08 PROCEDURE — 86850 RBC ANTIBODY SCREEN: CPT | Performed by: SURGERY

## 2020-10-08 PROCEDURE — 99223 1ST HOSP IP/OBS HIGH 75: CPT | Performed by: INTERNAL MEDICINE

## 2020-10-08 PROCEDURE — 250N000011 HC RX IP 250 OP 636: Performed by: NURSE ANESTHETIST, CERTIFIED REGISTERED

## 2020-10-08 PROCEDURE — 250N000013 HC RX MED GY IP 250 OP 250 PS 637: Performed by: STUDENT IN AN ORGANIZED HEALTH CARE EDUCATION/TRAINING PROGRAM

## 2020-10-08 PROCEDURE — 250N000011 HC RX IP 250 OP 636: Performed by: ANESTHESIOLOGY

## 2020-10-08 PROCEDURE — 80048 BASIC METABOLIC PNL TOTAL CA: CPT | Performed by: SURGERY

## 2020-10-08 PROCEDURE — 922N000002: Performed by: SURGERY

## 2020-10-08 PROCEDURE — 360N000026 HC SURGERY LEVEL 4 1ST 30 MIN: Performed by: SURGERY

## 2020-10-08 PROCEDURE — 03CL0ZZ EXTIRPATION OF MATTER FROM LEFT INTERNAL CAROTID ARTERY, OPEN APPROACH: ICD-10-PCS | Performed by: SURGERY

## 2020-10-08 PROCEDURE — 258N000003 HC RX IP 258 OP 636: Performed by: NURSE ANESTHETIST, CERTIFIED REGISTERED

## 2020-10-08 PROCEDURE — 86900 BLOOD TYPING SEROLOGIC ABO: CPT | Performed by: SURGERY

## 2020-10-08 PROCEDURE — 93005 ELECTROCARDIOGRAM TRACING: CPT

## 2020-10-08 PROCEDURE — 360N000027 HC SURGERY LEVEL 4 EA 15 ADDTL MIN: Performed by: SURGERY

## 2020-10-08 PROCEDURE — 03UJ0KZ SUPPLEMENT LEFT COMMON CAROTID ARTERY WITH NONAUTOLOGOUS TISSUE SUBSTITUTE, OPEN APPROACH: ICD-10-PCS | Performed by: SURGERY

## 2020-10-08 PROCEDURE — C1763 CONN TISS, NON-HUMAN: HCPCS | Performed by: SURGERY

## 2020-10-08 PROCEDURE — 250N000011 HC RX IP 250 OP 636: Performed by: SURGERY

## 2020-10-08 PROCEDURE — 250N000009 HC RX 250: Performed by: SURGERY

## 2020-10-08 PROCEDURE — 258N000003 HC RX IP 258 OP 636: Performed by: STUDENT IN AN ORGANIZED HEALTH CARE EDUCATION/TRAINING PROGRAM

## 2020-10-08 PROCEDURE — 258N000003 HC RX IP 258 OP 636: Performed by: SURGERY

## 2020-10-08 PROCEDURE — 80061 LIPID PANEL: CPT | Performed by: SURGERY

## 2020-10-08 PROCEDURE — 03CN0ZZ EXTIRPATION OF MATTER FROM LEFT EXTERNAL CAROTID ARTERY, OPEN APPROACH: ICD-10-PCS | Performed by: SURGERY

## 2020-10-08 PROCEDURE — 999N000138 HC STATISTIC PRE-PROCEDURE ASSESSMENT I: Performed by: SURGERY

## 2020-10-08 PROCEDURE — 250N000009 HC RX 250: Performed by: NURSE ANESTHETIST, CERTIFIED REGISTERED

## 2020-10-08 PROCEDURE — 761N000001 HC RECOVERY PHASE 1 LEVEL 1 FIRST HR: Performed by: SURGERY

## 2020-10-08 PROCEDURE — 4A10X4G MONITORING OF CENTRAL NERVOUS ELECTRICAL ACTIVITY, INTRAOPERATIVE, EXTERNAL APPROACH: ICD-10-PCS | Performed by: SURGERY

## 2020-10-08 PROCEDURE — 250N000013 HC RX MED GY IP 250 OP 250 PS 637: Performed by: INTERNAL MEDICINE

## 2020-10-08 PROCEDURE — 272N000001 HC OR GENERAL SUPPLY STERILE: Performed by: SURGERY

## 2020-10-08 PROCEDURE — 36415 COLL VENOUS BLD VENIPUNCTURE: CPT | Performed by: SURGERY

## 2020-10-08 PROCEDURE — 250N000003 HC SEVOFLURANE, EA 15 MIN: Performed by: SURGERY

## 2020-10-08 PROCEDURE — 370N000002 HC ANESTHESIA TECHNICAL FEE, EACH ADDTL 15 MIN: Performed by: SURGERY

## 2020-10-08 PROCEDURE — 370N000001 HC ANESTHESIA TECHNICAL FEE, 1ST 30 MIN: Performed by: SURGERY

## 2020-10-08 PROCEDURE — 86901 BLOOD TYPING SEROLOGIC RH(D): CPT | Performed by: SURGERY

## 2020-10-08 PROCEDURE — 120N000001 HC R&B MED SURG/OB

## 2020-10-08 DEVICE — DECELLULARIZED BOVINE PERICARDIUM
Type: IMPLANTABLE DEVICE | Site: NECK | Status: FUNCTIONAL
Brand: PHOTOFIX DECELLULARIZED BOVINE PERICARDIUM

## 2020-10-08 RX ORDER — TAMSULOSIN HYDROCHLORIDE 0.4 MG/1
0.4 CAPSULE ORAL AT BEDTIME
Status: DISCONTINUED | OUTPATIENT
Start: 2020-10-08 | End: 2020-10-09 | Stop reason: HOSPADM

## 2020-10-08 RX ORDER — SODIUM CHLORIDE, SODIUM LACTATE, POTASSIUM CHLORIDE, CALCIUM CHLORIDE 600; 310; 30; 20 MG/100ML; MG/100ML; MG/100ML; MG/100ML
INJECTION, SOLUTION INTRAVENOUS CONTINUOUS PRN
Status: DISCONTINUED | OUTPATIENT
Start: 2020-10-08 | End: 2020-10-08

## 2020-10-08 RX ORDER — MAGNESIUM HYDROXIDE 1200 MG/15ML
LIQUID ORAL PRN
Status: DISCONTINUED | OUTPATIENT
Start: 2020-10-08 | End: 2020-10-08 | Stop reason: HOSPADM

## 2020-10-08 RX ORDER — LIDOCAINE 40 MG/G
CREAM TOPICAL
Status: DISCONTINUED | OUTPATIENT
Start: 2020-10-08 | End: 2020-10-09 | Stop reason: HOSPADM

## 2020-10-08 RX ORDER — OXYCODONE AND ACETAMINOPHEN 5; 325 MG/1; MG/1
1 TABLET ORAL EVERY 6 HOURS PRN
Status: CANCELLED | OUTPATIENT
Start: 2020-10-08

## 2020-10-08 RX ORDER — SODIUM CHLORIDE 9 MG/ML
INJECTION, SOLUTION INTRAVENOUS CONTINUOUS
Status: DISCONTINUED | OUTPATIENT
Start: 2020-10-08 | End: 2020-10-09 | Stop reason: HOSPADM

## 2020-10-08 RX ORDER — ASCORBIC ACID 500 MG
1000 TABLET ORAL EVERY MORNING
Status: DISCONTINUED | OUTPATIENT
Start: 2020-10-08 | End: 2020-10-09 | Stop reason: HOSPADM

## 2020-10-08 RX ORDER — OXYCODONE HYDROCHLORIDE 5 MG/1
5-10 TABLET ORAL EVERY 4 HOURS PRN
Status: DISCONTINUED | OUTPATIENT
Start: 2020-10-08 | End: 2020-10-09 | Stop reason: HOSPADM

## 2020-10-08 RX ORDER — ESMOLOL HYDROCHLORIDE 10 MG/ML
INJECTION INTRAVENOUS PRN
Status: DISCONTINUED | OUTPATIENT
Start: 2020-10-08 | End: 2020-10-08

## 2020-10-08 RX ORDER — ONDANSETRON 2 MG/ML
4 INJECTION INTRAMUSCULAR; INTRAVENOUS EVERY 30 MIN PRN
Status: DISCONTINUED | OUTPATIENT
Start: 2020-10-08 | End: 2020-10-08 | Stop reason: HOSPADM

## 2020-10-08 RX ORDER — LIDOCAINE HYDROCHLORIDE 20 MG/ML
INJECTION, SOLUTION INFILTRATION; PERINEURAL PRN
Status: DISCONTINUED | OUTPATIENT
Start: 2020-10-08 | End: 2020-10-08

## 2020-10-08 RX ORDER — ONDANSETRON 2 MG/ML
INJECTION INTRAMUSCULAR; INTRAVENOUS PRN
Status: DISCONTINUED | OUTPATIENT
Start: 2020-10-08 | End: 2020-10-08

## 2020-10-08 RX ORDER — SODIUM CHLORIDE, SODIUM LACTATE, POTASSIUM CHLORIDE, CALCIUM CHLORIDE 600; 310; 30; 20 MG/100ML; MG/100ML; MG/100ML; MG/100ML
INJECTION, SOLUTION INTRAVENOUS CONTINUOUS
Status: DISCONTINUED | OUTPATIENT
Start: 2020-10-08 | End: 2020-10-08 | Stop reason: HOSPADM

## 2020-10-08 RX ORDER — PROCHLORPERAZINE MALEATE 5 MG
5 TABLET ORAL EVERY 6 HOURS PRN
Status: DISCONTINUED | OUTPATIENT
Start: 2020-10-08 | End: 2020-10-08

## 2020-10-08 RX ORDER — NALOXONE HYDROCHLORIDE 0.4 MG/ML
.1-.4 INJECTION, SOLUTION INTRAMUSCULAR; INTRAVENOUS; SUBCUTANEOUS
Status: DISCONTINUED | OUTPATIENT
Start: 2020-10-08 | End: 2020-10-09 | Stop reason: HOSPADM

## 2020-10-08 RX ORDER — ONDANSETRON 4 MG/1
4 TABLET, ORALLY DISINTEGRATING ORAL EVERY 6 HOURS PRN
Status: DISCONTINUED | OUTPATIENT
Start: 2020-10-08 | End: 2020-10-08

## 2020-10-08 RX ORDER — ESCITALOPRAM OXALATE 10 MG/1
20 TABLET ORAL EVERY MORNING
Status: DISCONTINUED | OUTPATIENT
Start: 2020-10-08 | End: 2020-10-08 | Stop reason: DRUGHIGH

## 2020-10-08 RX ORDER — ALPRAZOLAM 0.5 MG
0.5 TABLET ORAL
Status: DISCONTINUED | OUTPATIENT
Start: 2020-10-08 | End: 2020-10-09 | Stop reason: HOSPADM

## 2020-10-08 RX ORDER — DIPHENHYDRAMINE HCL 25 MG
25 CAPSULE ORAL
Status: DISCONTINUED | OUTPATIENT
Start: 2020-10-08 | End: 2020-10-09 | Stop reason: HOSPADM

## 2020-10-08 RX ORDER — ONDANSETRON 2 MG/ML
4 INJECTION INTRAMUSCULAR; INTRAVENOUS EVERY 6 HOURS PRN
Status: DISCONTINUED | OUTPATIENT
Start: 2020-10-08 | End: 2020-10-09 | Stop reason: HOSPADM

## 2020-10-08 RX ORDER — LIDOCAINE HYDROCHLORIDE 10 MG/ML
INJECTION, SOLUTION EPIDURAL; INFILTRATION; INTRACAUDAL; PERINEURAL
Status: DISCONTINUED
Start: 2020-10-08 | End: 2020-10-08 | Stop reason: HOSPADM

## 2020-10-08 RX ORDER — DIMENHYDRINATE 50 MG/ML
12.5 INJECTION, SOLUTION INTRAMUSCULAR; INTRAVENOUS
Status: DISCONTINUED | OUTPATIENT
Start: 2020-10-08 | End: 2020-10-08 | Stop reason: HOSPADM

## 2020-10-08 RX ORDER — ACETAMINOPHEN 325 MG/1
650 TABLET ORAL EVERY 4 HOURS PRN
Status: DISCONTINUED | OUTPATIENT
Start: 2020-10-11 | End: 2020-10-09 | Stop reason: HOSPADM

## 2020-10-08 RX ORDER — FENTANYL CITRATE 50 UG/ML
INJECTION, SOLUTION INTRAMUSCULAR; INTRAVENOUS PRN
Status: DISCONTINUED | OUTPATIENT
Start: 2020-10-08 | End: 2020-10-08

## 2020-10-08 RX ORDER — HEPARIN SODIUM 1000 [USP'U]/ML
INJECTION, SOLUTION INTRAVENOUS; SUBCUTANEOUS
Status: DISCONTINUED
Start: 2020-10-08 | End: 2020-10-08 | Stop reason: HOSPADM

## 2020-10-08 RX ORDER — SULFAMETHOXAZOLE AND TRIMETHOPRIM 400; 80 MG/1; MG/1
1 TABLET ORAL 2 TIMES DAILY
Status: DISCONTINUED | OUTPATIENT
Start: 2020-10-08 | End: 2020-10-09 | Stop reason: HOSPADM

## 2020-10-08 RX ORDER — ASPIRIN 81 MG/1
81 TABLET ORAL DAILY
Status: DISCONTINUED | OUTPATIENT
Start: 2020-10-09 | End: 2020-10-09 | Stop reason: HOSPADM

## 2020-10-08 RX ORDER — BISACODYL 10 MG
10 SUPPOSITORY, RECTAL RECTAL DAILY PRN
Status: DISCONTINUED | OUTPATIENT
Start: 2020-10-08 | End: 2020-10-09 | Stop reason: HOSPADM

## 2020-10-08 RX ORDER — ESCITALOPRAM OXALATE 10 MG/1
30 TABLET ORAL EVERY MORNING
Status: DISCONTINUED | OUTPATIENT
Start: 2020-10-08 | End: 2020-10-09 | Stop reason: HOSPADM

## 2020-10-08 RX ORDER — PANTOPRAZOLE SODIUM 40 MG/1
40 TABLET, DELAYED RELEASE ORAL
Status: DISCONTINUED | OUTPATIENT
Start: 2020-10-09 | End: 2020-10-09 | Stop reason: HOSPADM

## 2020-10-08 RX ORDER — CLOPIDOGREL BISULFATE 75 MG/1
75 TABLET ORAL DAILY
Status: CANCELLED | OUTPATIENT
Start: 2020-10-08

## 2020-10-08 RX ORDER — CEFAZOLIN SODIUM 2 G/100ML
2 INJECTION, SOLUTION INTRAVENOUS
Status: COMPLETED | OUTPATIENT
Start: 2020-10-08 | End: 2020-10-08

## 2020-10-08 RX ORDER — MEPERIDINE HYDROCHLORIDE 25 MG/ML
12.5 INJECTION INTRAMUSCULAR; INTRAVENOUS; SUBCUTANEOUS EVERY 5 MIN PRN
Status: DISCONTINUED | OUTPATIENT
Start: 2020-10-08 | End: 2020-10-08 | Stop reason: HOSPADM

## 2020-10-08 RX ORDER — ONDANSETRON 4 MG/1
4 TABLET, ORALLY DISINTEGRATING ORAL EVERY 30 MIN PRN
Status: DISCONTINUED | OUTPATIENT
Start: 2020-10-08 | End: 2020-10-08 | Stop reason: HOSPADM

## 2020-10-08 RX ORDER — EPHEDRINE SULFATE 50 MG/ML
INJECTION, SOLUTION INTRAMUSCULAR; INTRAVENOUS; SUBCUTANEOUS PRN
Status: DISCONTINUED | OUTPATIENT
Start: 2020-10-08 | End: 2020-10-08

## 2020-10-08 RX ORDER — HYDROMORPHONE HYDROCHLORIDE 1 MG/ML
.3-.5 INJECTION, SOLUTION INTRAMUSCULAR; INTRAVENOUS; SUBCUTANEOUS EVERY 5 MIN PRN
Status: DISCONTINUED | OUTPATIENT
Start: 2020-10-08 | End: 2020-10-08 | Stop reason: HOSPADM

## 2020-10-08 RX ORDER — LABETALOL HYDROCHLORIDE 5 MG/ML
INJECTION, SOLUTION INTRAVENOUS PRN
Status: DISCONTINUED | OUTPATIENT
Start: 2020-10-08 | End: 2020-10-08

## 2020-10-08 RX ORDER — ASPIRIN 600 MG/1
600 SUPPOSITORY RECTAL ONCE
Status: COMPLETED | OUTPATIENT
Start: 2020-10-08 | End: 2020-10-08

## 2020-10-08 RX ORDER — LANSOPRAZOLE 30 MG/1
40 CAPSULE, DELAYED RELEASE ORAL EVERY MORNING
Status: DISCONTINUED | OUTPATIENT
Start: 2020-10-08 | End: 2020-10-08 | Stop reason: CLARIF

## 2020-10-08 RX ORDER — HYDROMORPHONE HYDROCHLORIDE 1 MG/ML
.3-.5 INJECTION, SOLUTION INTRAMUSCULAR; INTRAVENOUS; SUBCUTANEOUS
Status: DISCONTINUED | OUTPATIENT
Start: 2020-10-08 | End: 2020-10-09 | Stop reason: HOSPADM

## 2020-10-08 RX ORDER — POLYETHYLENE GLYCOL 3350 17 G/17G
17 POWDER, FOR SOLUTION ORAL EVERY MORNING
Status: DISCONTINUED | OUTPATIENT
Start: 2020-10-08 | End: 2020-10-09 | Stop reason: HOSPADM

## 2020-10-08 RX ORDER — EZETIMIBE 10 MG/1
10 TABLET ORAL AT BEDTIME
Status: DISCONTINUED | OUTPATIENT
Start: 2020-10-08 | End: 2020-10-09 | Stop reason: HOSPADM

## 2020-10-08 RX ORDER — ONDANSETRON 2 MG/ML
4 INJECTION INTRAMUSCULAR; INTRAVENOUS EVERY 6 HOURS PRN
Status: DISCONTINUED | OUTPATIENT
Start: 2020-10-08 | End: 2020-10-08

## 2020-10-08 RX ORDER — GLYCOPYRROLATE 0.2 MG/ML
INJECTION, SOLUTION INTRAMUSCULAR; INTRAVENOUS PRN
Status: DISCONTINUED | OUTPATIENT
Start: 2020-10-08 | End: 2020-10-08

## 2020-10-08 RX ORDER — NALOXONE HYDROCHLORIDE 0.4 MG/ML
.1-.4 INJECTION, SOLUTION INTRAMUSCULAR; INTRAVENOUS; SUBCUTANEOUS
Status: DISCONTINUED | OUTPATIENT
Start: 2020-10-08 | End: 2020-10-08

## 2020-10-08 RX ORDER — DEXAMETHASONE SODIUM PHOSPHATE 4 MG/ML
INJECTION, SOLUTION INTRA-ARTICULAR; INTRALESIONAL; INTRAMUSCULAR; INTRAVENOUS; SOFT TISSUE PRN
Status: DISCONTINUED | OUTPATIENT
Start: 2020-10-08 | End: 2020-10-08

## 2020-10-08 RX ORDER — BUPIVACAINE HYDROCHLORIDE AND EPINEPHRINE 2.5; 5 MG/ML; UG/ML
INJECTION, SOLUTION EPIDURAL; INFILTRATION; INTRACAUDAL; PERINEURAL
Status: DISCONTINUED
Start: 2020-10-08 | End: 2020-10-08 | Stop reason: HOSPADM

## 2020-10-08 RX ORDER — CIPROFLOXACIN 250 MG/1
500 TABLET, FILM COATED ORAL 2 TIMES DAILY
Status: DISCONTINUED | OUTPATIENT
Start: 2020-10-08 | End: 2020-10-08 | Stop reason: CLARIF

## 2020-10-08 RX ORDER — LABETALOL HYDROCHLORIDE 5 MG/ML
10 INJECTION, SOLUTION INTRAVENOUS EVERY 10 MIN PRN
Status: DISCONTINUED | OUTPATIENT
Start: 2020-10-08 | End: 2020-10-09 | Stop reason: HOSPADM

## 2020-10-08 RX ORDER — NEOSTIGMINE METHYLSULFATE 1 MG/ML
VIAL (ML) INJECTION PRN
Status: DISCONTINUED | OUTPATIENT
Start: 2020-10-08 | End: 2020-10-08

## 2020-10-08 RX ORDER — POLYETHYLENE GLYCOL 3350 17 G/17G
17 POWDER, FOR SOLUTION ORAL DAILY PRN
Status: DISCONTINUED | OUTPATIENT
Start: 2020-10-08 | End: 2020-10-09 | Stop reason: HOSPADM

## 2020-10-08 RX ORDER — MAGNESIUM OXIDE 400 MG/1
400 TABLET ORAL EVERY MORNING
Status: DISCONTINUED | OUTPATIENT
Start: 2020-10-08 | End: 2020-10-09 | Stop reason: HOSPADM

## 2020-10-08 RX ORDER — ONDANSETRON 4 MG/1
4 TABLET, ORALLY DISINTEGRATING ORAL EVERY 6 HOURS PRN
Status: DISCONTINUED | OUTPATIENT
Start: 2020-10-08 | End: 2020-10-09 | Stop reason: HOSPADM

## 2020-10-08 RX ORDER — SODIUM CHLORIDE, SODIUM LACTATE, POTASSIUM CHLORIDE, CALCIUM CHLORIDE 600; 310; 30; 20 MG/100ML; MG/100ML; MG/100ML; MG/100ML
INJECTION, SOLUTION INTRAVENOUS CONTINUOUS
Status: DISCONTINUED | OUTPATIENT
Start: 2020-10-08 | End: 2020-10-08

## 2020-10-08 RX ORDER — SULFAMETHOXAZOLE AND TRIMETHOPRIM 400; 80 MG/1; MG/1
1 TABLET ORAL 2 TIMES DAILY
Qty: 10 TABLET | Refills: 0 | Status: SHIPPED | OUTPATIENT
Start: 2020-10-08 | End: 2020-10-13

## 2020-10-08 RX ORDER — PROCHLORPERAZINE 25 MG
12.5 SUPPOSITORY, RECTAL RECTAL EVERY 12 HOURS PRN
Status: DISCONTINUED | OUTPATIENT
Start: 2020-10-08 | End: 2020-10-09 | Stop reason: HOSPADM

## 2020-10-08 RX ORDER — OXYCODONE HYDROCHLORIDE 5 MG/1
5-10 TABLET ORAL EVERY 4 HOURS PRN
Qty: 12 TABLET | Refills: 0 | Status: SHIPPED | OUTPATIENT
Start: 2020-10-08 | End: 2020-10-11

## 2020-10-08 RX ORDER — PHENAZOPYRIDINE HYDROCHLORIDE 100 MG/1
100 TABLET, FILM COATED ORAL 3 TIMES DAILY PRN
Status: DISCONTINUED | OUTPATIENT
Start: 2020-10-08 | End: 2020-10-09 | Stop reason: HOSPADM

## 2020-10-08 RX ORDER — HEPARIN SODIUM 1000 [USP'U]/ML
INJECTION, SOLUTION INTRAVENOUS; SUBCUTANEOUS PRN
Status: DISCONTINUED | OUTPATIENT
Start: 2020-10-08 | End: 2020-10-08

## 2020-10-08 RX ORDER — ATORVASTATIN CALCIUM 10 MG/1
20 TABLET, FILM COATED ORAL DAILY
Status: DISCONTINUED | OUTPATIENT
Start: 2020-10-08 | End: 2020-10-09 | Stop reason: HOSPADM

## 2020-10-08 RX ORDER — PROCHLORPERAZINE MALEATE 5 MG
5 TABLET ORAL EVERY 6 HOURS PRN
Status: DISCONTINUED | OUTPATIENT
Start: 2020-10-08 | End: 2020-10-09 | Stop reason: HOSPADM

## 2020-10-08 RX ORDER — HYDROMORPHONE HYDROCHLORIDE 1 MG/ML
0.3 INJECTION, SOLUTION INTRAMUSCULAR; INTRAVENOUS; SUBCUTANEOUS
Status: DISCONTINUED | OUTPATIENT
Start: 2020-10-08 | End: 2020-10-08

## 2020-10-08 RX ORDER — AMOXICILLIN 250 MG
2 CAPSULE ORAL 2 TIMES DAILY PRN
Status: DISCONTINUED | OUTPATIENT
Start: 2020-10-08 | End: 2020-10-09 | Stop reason: HOSPADM

## 2020-10-08 RX ORDER — PROPOFOL 10 MG/ML
INJECTION, EMULSION INTRAVENOUS PRN
Status: DISCONTINUED | OUTPATIENT
Start: 2020-10-08 | End: 2020-10-08

## 2020-10-08 RX ORDER — ACETAMINOPHEN 325 MG/1
975 TABLET ORAL EVERY 8 HOURS
Status: DISCONTINUED | OUTPATIENT
Start: 2020-10-08 | End: 2020-10-09 | Stop reason: HOSPADM

## 2020-10-08 RX ORDER — ASPIRIN 81 MG/1
81 TABLET, CHEWABLE ORAL DAILY
Status: CANCELLED | OUTPATIENT
Start: 2020-10-08

## 2020-10-08 RX ORDER — FENTANYL CITRATE 50 UG/ML
50 INJECTION, SOLUTION INTRAMUSCULAR; INTRAVENOUS
Status: DISCONTINUED | OUTPATIENT
Start: 2020-10-08 | End: 2020-10-08

## 2020-10-08 RX ORDER — ACETAMINOPHEN 500 MG
1000 TABLET ORAL 3 TIMES DAILY PRN
Status: DISCONTINUED | OUTPATIENT
Start: 2020-10-08 | End: 2020-10-08

## 2020-10-08 RX ORDER — ZINC SULFATE 50(220)MG
220 CAPSULE ORAL EVERY MORNING
Status: DISCONTINUED | OUTPATIENT
Start: 2020-10-08 | End: 2020-10-09 | Stop reason: HOSPADM

## 2020-10-08 RX ORDER — FENTANYL CITRATE 50 UG/ML
25-50 INJECTION, SOLUTION INTRAMUSCULAR; INTRAVENOUS
Status: DISCONTINUED | OUTPATIENT
Start: 2020-10-08 | End: 2020-10-08 | Stop reason: HOSPADM

## 2020-10-08 RX ORDER — FERROUS SULFATE 325(65) MG
325 TABLET ORAL EVERY MORNING
Status: DISCONTINUED | OUTPATIENT
Start: 2020-10-08 | End: 2020-10-09 | Stop reason: HOSPADM

## 2020-10-08 RX ADMIN — PHENYLEPHRINE HYDROCHLORIDE 50 MCG: 10 INJECTION INTRAVENOUS at 10:28

## 2020-10-08 RX ADMIN — CEFAZOLIN SODIUM 2 G: 2 INJECTION, SOLUTION INTRAVENOUS at 08:00

## 2020-10-08 RX ADMIN — ALPRAZOLAM 0.5 MG: 0.5 TABLET ORAL at 20:33

## 2020-10-08 RX ADMIN — PROPOFOL 20 MG: 10 INJECTION, EMULSION INTRAVENOUS at 10:58

## 2020-10-08 RX ADMIN — LIDOCAINE HYDROCHLORIDE 40 MG: 20 INJECTION, SOLUTION INFILTRATION; PERINEURAL at 10:57

## 2020-10-08 RX ADMIN — ATORVASTATIN CALCIUM 20 MG: 10 TABLET, FILM COATED ORAL at 13:47

## 2020-10-08 RX ADMIN — FENTANYL CITRATE 50 MCG: 50 INJECTION, SOLUTION INTRAMUSCULAR; INTRAVENOUS at 08:39

## 2020-10-08 RX ADMIN — PROPOFOL 140 MG: 10 INJECTION, EMULSION INTRAVENOUS at 07:45

## 2020-10-08 RX ADMIN — FENTANYL CITRATE 25 MCG: 50 INJECTION, SOLUTION INTRAMUSCULAR; INTRAVENOUS at 11:05

## 2020-10-08 RX ADMIN — LABETALOL HYDROCHLORIDE 10 MG: 5 INJECTION INTRAVENOUS at 11:05

## 2020-10-08 RX ADMIN — ONDANSETRON 4 MG: 2 INJECTION INTRAMUSCULAR; INTRAVENOUS at 10:31

## 2020-10-08 RX ADMIN — PHENYLEPHRINE HYDROCHLORIDE 100 MCG: 10 INJECTION INTRAVENOUS at 10:30

## 2020-10-08 RX ADMIN — FENTANYL CITRATE 25 MCG: 50 INJECTION, SOLUTION INTRAMUSCULAR; INTRAVENOUS at 10:44

## 2020-10-08 RX ADMIN — Medication 5 MG: at 08:26

## 2020-10-08 RX ADMIN — REMIFENTANIL HYDROCHLORIDE 0.1 MCG/KG/MIN: 1 INJECTION, POWDER, LYOPHILIZED, FOR SOLUTION INTRAVENOUS at 07:52

## 2020-10-08 RX ADMIN — FENTANYL CITRATE 25 MCG: 50 INJECTION, SOLUTION INTRAMUSCULAR; INTRAVENOUS at 10:45

## 2020-10-08 RX ADMIN — ESCITALOPRAM OXALATE 30 MG: 10 TABLET ORAL at 13:48

## 2020-10-08 RX ADMIN — ROCURONIUM BROMIDE 40 MG: 10 INJECTION INTRAVENOUS at 07:45

## 2020-10-08 RX ADMIN — SODIUM CHLORIDE: 9 INJECTION, SOLUTION INTRAVENOUS at 14:03

## 2020-10-08 RX ADMIN — ACETAMINOPHEN 975 MG: 325 TABLET, FILM COATED ORAL at 20:27

## 2020-10-08 RX ADMIN — SODIUM CHLORIDE, POTASSIUM CHLORIDE, SODIUM LACTATE AND CALCIUM CHLORIDE: 600; 310; 30; 20 INJECTION, SOLUTION INTRAVENOUS at 08:50

## 2020-10-08 RX ADMIN — LABETALOL HYDROCHLORIDE 5 MG: 5 INJECTION INTRAVENOUS at 11:01

## 2020-10-08 RX ADMIN — HEPARIN SODIUM 5000 UNITS: 1000 INJECTION INTRAVENOUS; SUBCUTANEOUS at 08:43

## 2020-10-08 RX ADMIN — OXYCODONE HYDROCHLORIDE 5 MG: 5 TABLET ORAL at 15:57

## 2020-10-08 RX ADMIN — ACETAMINOPHEN 975 MG: 325 TABLET, FILM COATED ORAL at 13:47

## 2020-10-08 RX ADMIN — FENTANYL CITRATE 50 MCG: 50 INJECTION, SOLUTION INTRAMUSCULAR; INTRAVENOUS at 07:45

## 2020-10-08 RX ADMIN — POLYETHYLENE GLYCOL 3350 17 G: 17 POWDER, FOR SOLUTION ORAL at 15:04

## 2020-10-08 RX ADMIN — HEPARIN SODIUM 2000 UNITS: 1000 INJECTION INTRAVENOUS; SUBCUTANEOUS at 09:43

## 2020-10-08 RX ADMIN — OXYCODONE HYDROCHLORIDE AND ACETAMINOPHEN 1000 MG: 500 TABLET ORAL at 14:37

## 2020-10-08 RX ADMIN — DEXAMETHASONE SODIUM PHOSPHATE 4 MG: 4 INJECTION, SOLUTION INTRA-ARTICULAR; INTRALESIONAL; INTRAMUSCULAR; INTRAVENOUS; SOFT TISSUE at 08:00

## 2020-10-08 RX ADMIN — HYDROMORPHONE HYDROCHLORIDE 0.5 MG: 1 INJECTION, SOLUTION INTRAMUSCULAR; INTRAVENOUS; SUBCUTANEOUS at 12:19

## 2020-10-08 RX ADMIN — PHENAZOPYRIDINE HYDROCHLORIDE 100 MG: 100 TABLET ORAL at 15:55

## 2020-10-08 RX ADMIN — LIDOCAINE HYDROCHLORIDE 60 MG: 20 INJECTION, SOLUTION INFILTRATION; PERINEURAL at 07:45

## 2020-10-08 RX ADMIN — PHENYLEPHRINE HYDROCHLORIDE 150 MCG: 10 INJECTION INTRAVENOUS at 08:07

## 2020-10-08 RX ADMIN — OXYCODONE HYDROCHLORIDE 5 MG: 5 TABLET ORAL at 20:28

## 2020-10-08 RX ADMIN — ESMOLOL HYDROCHLORIDE 20 MG: 10 INJECTION, SOLUTION INTRAVENOUS at 11:16

## 2020-10-08 RX ADMIN — PHENYLEPHRINE HYDROCHLORIDE 50 MCG: 10 INJECTION INTRAVENOUS at 08:26

## 2020-10-08 RX ADMIN — GLYCOPYRROLATE 0.4 MG: 0.2 INJECTION, SOLUTION INTRAMUSCULAR; INTRAVENOUS at 11:10

## 2020-10-08 RX ADMIN — Medication 5 MG: at 10:30

## 2020-10-08 RX ADMIN — PHENYLEPHRINE HYDROCHLORIDE 0.25 MCG/KG/MIN: 10 INJECTION INTRAVENOUS at 08:07

## 2020-10-08 RX ADMIN — NEOSTIGMINE METHYLSULFATE 3 MG: 1 INJECTION, SOLUTION INTRAVENOUS at 11:10

## 2020-10-08 RX ADMIN — ESMOLOL HYDROCHLORIDE 30 MG: 10 INJECTION, SOLUTION INTRAVENOUS at 11:15

## 2020-10-08 RX ADMIN — MIDAZOLAM HYDROCHLORIDE 1 MG: 1 INJECTION, SOLUTION INTRAMUSCULAR; INTRAVENOUS at 06:47

## 2020-10-08 RX ADMIN — SULFAMETHOXAZOLE AND TRIMETHOPRIM 1 TABLET: 400; 80 TABLET ORAL at 20:28

## 2020-10-08 RX ADMIN — PHENYLEPHRINE HYDROCHLORIDE 100 MCG: 10 INJECTION INTRAVENOUS at 08:06

## 2020-10-08 RX ADMIN — PHENAZOPYRIDINE HYDROCHLORIDE 100 MG: 100 TABLET ORAL at 20:33

## 2020-10-08 RX ADMIN — PHENYLEPHRINE HYDROCHLORIDE 100 MCG: 10 INJECTION INTRAVENOUS at 08:47

## 2020-10-08 RX ADMIN — CHOLECALCIFEROL TAB 125 MCG (5000 UNIT) 125 MCG: 125 TAB at 14:36

## 2020-10-08 RX ADMIN — ESMOLOL HYDROCHLORIDE 10 MG: 10 INJECTION, SOLUTION INTRAVENOUS at 11:00

## 2020-10-08 RX ADMIN — PHENYLEPHRINE HYDROCHLORIDE 50 MCG: 10 INJECTION INTRAVENOUS at 10:29

## 2020-10-08 RX ADMIN — Medication 5 MG: at 10:28

## 2020-10-08 RX ADMIN — CEFAZOLIN SODIUM 1 G: 2 INJECTION, SOLUTION INTRAVENOUS at 10:00

## 2020-10-08 RX ADMIN — HYDROMORPHONE HYDROCHLORIDE 0.5 MG: 1 INJECTION, SOLUTION INTRAMUSCULAR; INTRAVENOUS; SUBCUTANEOUS at 11:38

## 2020-10-08 RX ADMIN — Medication 5 MG: at 08:46

## 2020-10-08 RX ADMIN — FERROUS SULFATE TAB 325 MG (65 MG ELEMENTAL FE) 325 MG: 325 (65 FE) TAB at 14:36

## 2020-10-08 RX ADMIN — PHENYLEPHRINE HYDROCHLORIDE 100 MCG: 10 INJECTION INTRAVENOUS at 08:48

## 2020-10-08 RX ADMIN — LABETALOL HYDROCHLORIDE 10 MG: 5 INJECTION INTRAVENOUS at 11:17

## 2020-10-08 RX ADMIN — EZETIMIBE 10 MG: 10 TABLET ORAL at 20:29

## 2020-10-08 RX ADMIN — ASPIRIN 600 MG: 600 SUPPOSITORY RECTAL at 11:33

## 2020-10-08 RX ADMIN — SODIUM CHLORIDE, POTASSIUM CHLORIDE, SODIUM LACTATE AND CALCIUM CHLORIDE: 600; 310; 30; 20 INJECTION, SOLUTION INTRAVENOUS at 06:20

## 2020-10-08 RX ADMIN — ROCURONIUM BROMIDE 10 MG: 10 INJECTION INTRAVENOUS at 08:10

## 2020-10-08 RX ADMIN — ZINC SULFATE 220 MG (50 MG) CAPSULE 220 MG: CAPSULE at 15:55

## 2020-10-08 RX ADMIN — Medication 400 MG: at 14:36

## 2020-10-08 RX ADMIN — PHENYLEPHRINE HYDROCHLORIDE 50 MCG: 10 INJECTION INTRAVENOUS at 08:46

## 2020-10-08 RX ADMIN — PSYLLIUM HUSK 1 PACKET: 3.4 POWDER ORAL at 15:04

## 2020-10-08 RX ADMIN — SODIUM CHLORIDE, SODIUM LACTATE, POTASSIUM CHLORIDE, CALCIUM CHLORIDE: 600; 310; 30; 20 INJECTION, SOLUTION INTRAVENOUS at 07:54

## 2020-10-08 ASSESSMENT — MIFFLIN-ST. JEOR: SCORE: 1144.11

## 2020-10-08 ASSESSMENT — COPD QUESTIONNAIRES: COPD: 0

## 2020-10-08 ASSESSMENT — ACTIVITIES OF DAILY LIVING (ADL)
ADLS_ACUITY_SCORE: 15
ADLS_ACUITY_SCORE: 15

## 2020-10-08 NOTE — ANESTHESIA POSTPROCEDURE EVALUATION
Patient: Michaela Dorman    Procedure(s):  LEFT CAROTID ENDARTERECTOMY WITH EEG    Diagnosis:Symptomatic stenosis of left carotid artery [I65.22]  Diagnosis Additional Information: No value filed.    Anesthesia Type:  General, ETT    Note:  Anesthesia Post Evaluation    Patient location during evaluation: PACU  Patient participation: Able to fully participate in evaluation  Level of consciousness: awake and alert  Pain management: adequate  Airway patency: patent  Cardiovascular status: acceptable and hemodynamically stable  Respiratory status: nonlabored ventilation, unassisted and acceptable  Hydration status: acceptable  PONV: none             Last vitals:  Vitals:    10/08/20 1500 10/08/20 1523 10/08/20 1700   BP: 124/63  123/62   Pulse: 66  61   Resp:      Temp:  36.8  C (98.2  F)    SpO2: 94%  93%         Electronically Signed By: Archie Ding MD  October 8, 2020  5:30 PM

## 2020-10-08 NOTE — ANESTHESIA PROCEDURE NOTES
ARTERIAL LINE PROCEDURE NOTE:      Staff -   Anesthesiologist:  Archie Ding MD  Performed By: Anesthesiologist   Pre-Procedure  Performed by Archie Ding MD  Location: pre-op      Pre-Anesthestic Checklist: patient identified, IV checked, risks and benefits discussed, informed consent, monitors and equipment checked and pre-op evaluation    Timeout  Correct Patient: Yes   Correct Procedure: Yes   Correct Site: Yes   Correct Laterality: N/A   Correct Position: Yes   Site Marked: N/A   .   Procedure Documentation  Procedure: arterial line    Supine  Insertion Site:right (Radial).Skin infiltrated with 1 mL of 1% lidocaine. Injection technique: ultrasound guided  .  Artery evaluated via ultrasound confirming patency.  Using realtime imaging the artery was punctured and needle was observed entering artery..  A permanent image is entered into the patient's record.Patient Prep/Sterile Barriers; all elements of maximal sterile barrier technique followed, mask, hat, sterile gown, sterile gloves, draped, hand hygiene, chlorhexidine gluconate and isopropyl alcohol    Assessment/Narrative    Catheter: 20 gauge, 1.75 in/4.5 cm quick cath (integral wire)     Secured by other  Tegaderm dressing used.    Arterial waveform: Yes IBP within 10% of NIBP: Yes    Comments:  Pt tolerated well.  No complications.     Ultrasound Interpretation, central venous and arterial catheter  1. Ultrasound guidance was used to evaluate potential access sites.  2. Ultrasound was also used to verify the patency of the vessel specified above.   3. Ultrasound was used to visualize the needle entering the vessel.   4. The visualized structures were anatomically normal.  5. There were no apparent abnormal pathological findings.  6. A permanent ultrasound image was saved in the patient's record.

## 2020-10-08 NOTE — CONSULTS
[facility]    Vascular Medicine Consultation     Attestation: I have examined the patient independently of Makeda Brandon PA-C and agree with the examination and plan as delineated below.    Inderjit Quinones MD      Date of Admission:  10/8/2020  Date of Consult (When I saw the patient): 10/08/20    Assessment & Plan   1. Asymptomatic high-grade (80%) stenosis of left carotid artery s/p left carotid endarterectomy    Post-operative cares per Dr. Jasmine / Vascular Surgery. She had been on Aspirin and Plavix prior to admission. Plavix had just been initiated on 9/14/20 by Dr. Blanco with the new finding of high grade carotid stenosis and significant plaque. These can be resumed at the discretion of Vascular Surgery.     2. Hyperlipidemia    Her lipid panel this admission was significant for an LDL of 87, HDL 76, triglycerides 180, and total cholesterol 199 while on Atorvastatin 20 mg daily. Given her high grade carotid stenosis, she should optimally be treated more aggressively to an LDL of less than 70. She is unlikely to reach this goal by increasing Atorvastatin or switching to Crestor. Therefore, will add Zetia 10 mg daily. She should then have a lipid panel repeated in 3 months through her primary care provider to ascertain whether or not her lipids are at goal on this regimen.     3. Probable UTI    She was diagnosed with a probable UTI and started on antibiotics about 8 days ago. Urine culture ended up returning positive for < 10,000 colonies of mixed cher. She states she continues to have symptoms. Antibiotics have been continued. Will monitor.       Reason for Consult   Reason for consult: Asked by Dr. Jasmine to evaluate vascular risk factors and assist with medical management in this 70 year old female with a history of hyperlipidemia and hypertension who presents for a left carotid endarterectomy for high-grade (80%) asymptomatic right carotid stenosis.     Primary Care  Physician   Phani Jason      History of Present Illness   Michaela Dorman is a 70 year old female remote smoker with a history of DDD, depression, breast cancer, hypertension, and hyperlipidemia who was evaluated in the emergency department for a headache in 8/2020. Work-up incidentally revealed an 80% left internal carotid artery stenosis. She was evaluated by Dr. Jasmine of Vascular Surgery and a left carotid endarterectomy was recommended. She presented for this today. She has no history of stroke or TIA. She does have some declining vision in her left eye but Dr. Jasmine does not believe this would be secondary to her carotid stenosis.     She is doing well post-operatively. Her pain is controlled and she remains neurologically intact. She has been bothered some by a UTI she has been treated for. She currently has a watkins catheter in place.     Past Medical History   Past Medical History:   Diagnosis Date     Carotid stenosis, bilateral L80%, R40% 9/2/2020     Central stenosis of spinal canal 12/1/2017    lumbar      DDD (degenerative disc disease), lumbar 12/1/2017     Depressive disorder, not elsewhere classified      Esophageal reflux      ETOH abuse     in remission     Foraminal stenosis of lumbar region 12/1/2017    Complete lumbar spine left at various degrees and to a lesser extent the right as well.     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     Subdural hematoma (H)        Past Surgical History   Past Surgical History:   Procedure Laterality Date     ARTHROPLASTY SHOULDER Right 11/17/2015     ARTHROSCOPY KNEE  6/7/2012    Procedure:ARTHROSCOPY KNEE; right knee arthroscopy with partial lateral menisectomy; Surgeon:DAMIÁN MCALLISTER; Location:[facility] OR     C LIGATE FALLOPIAN TUBE       COLONOSCOPY N/A 12/22/2017    Procedure: COLONOSCOPY;  colonoscopy;   Surgeon: Chuck Chand MD;  Location: [facility]     EXCISE MASS AXILLA Left 9/16/2016    Procedure: EXCISE MASS AXILLA;  Surgeon: Keyon Blanco MD;  Location: [facility] OR     HC REMV CATARACT EXTRACAP,INSERT LENS, W/O ECP  6/25/2009    Right eye     INJECT EPIDURAL LUMBAR N/A 4/11/2019    Procedure: interlaminar epidual steroid injection lumbar 4-5;  Surgeon: Oskar Salvador MD;  Location: [facility] OR     INJECT EPIDURAL LUMBAR Bilateral 9/12/2019    Procedure: lumbar 4-5 epidural interlaminar steroid injection;  Surgeon: Oskar Salvador MD;  Location: [facility]     INSERT PORT VASCULAR ACCESS Right 10/7/2016    Procedure: INSERT PORT VASCULAR ACCESS;  Surgeon: Keyon Blanco MD;  [facility]     MASTECTOMY SIMPLE BILATERAL Bilateral 2/8/2017    Procedure: MASTECTOMY SIMPLE BILATERAL;  Surgeon: Keyon Blanco MD;  [facility]: [facility]     OPEN REDUCTION INTERNAL FIXATION FOOT Right 3/20/2015    Procedure: OPEN REDUCTION INTERNAL FIXATION FOOT;  Surgeon: Javi Herrmann DPM;  Location: [facility]     OPTICAL TRACKING SYSTEM FUSION SPINE POSTERIOR LUMBAR TWO LEVELS N/A 2/4/2020    Procedure: LUMBAR 2-SACRAL1 TRANSFORMINAL INTERBODY FUSION;  Surgeon: Lenny Gomes MD;  Location: [facility] OR     REMOVE PORT VASCULAR ACCESS Right 2/14/2018    Procedure: REMOVE PORT VASCULAR ACCESS;  right intra jugular port removal;  Surgeon: Keyon Blanco MD;  Location: [facility] OR     SHOULDER SURGERY Right 11/2015     [facility] NONSPECIFIC PROCEDURE  1957    ankle fracture surgery       Prior to Admission Medications   Prior to Admission Medications   Prescriptions Last Dose Informant Patient Reported? Taking?   ALPRAZolam (XANAX) 0.5 MG tablet  at PRN Self No Yes   Sig: TAKE ONE TABLET BY MOUTH AT BEDTIME AS NEEDED FOR SLEEP   B-12 METHYLCOBALAMIN PO 10/7/2020 at 0800 Self Yes Yes   Sig: Take 5,000 mcg by mouth every morning   Garlic 1000 MG CAPS 10/7/2020 at 0800 Self Yes Yes   Sig: Take 1,000 mg by mouth every morning   Lansoprazole (PREVACID PO)  10/8/2020 at 0500 Self Yes Yes   Sig: Take 40 mg by mouth every morning    Magnesium Oxide 500 MG TABS 10/7/2020 at 0800 Self Yes Yes   Sig: Take 500 mg by mouth every morning   Psyllium (METAMUCIL FIBER PO) 10/7/2020 at 0800 Self Yes Yes   Sig: Take 2 teaspoonful by mouth every morning (mix in with liquid and drink)   Zinc 30 MG CAPS 10/7/2020 at 0800 Self Yes Yes   Sig: Take 30 mg by mouth every morning    acetaminophen (TYLENOL) 500 MG tablet 10/8/2020 at 0500 Self Yes Yes   Sig: Take 1,000 mg by mouth 3 times daily as needed for mild pain (500MG X 2 = 1,000MG)   aspirin (ASA) 81 MG chewable tablet  at AM Self No Yes   Sig: Take 1 tablet (81 mg) by mouth daily   atorvastatin (LIPITOR) 20 MG tablet 10/7/2020 at 0800 Self No Yes   Sig: Take 1 tablet (20 mg) by mouth daily   celecoxib (CELEBREX) 200 MG capsule Past Month at AM Self No Yes   Sig: Take 1 capsule (200 mg) by mouth 2 times daily   cholecalciferol (VITAMIN D3) 5000 units (125 mcg) capsule 10/7/2020 at 0800 Self Yes Yes   Sig: Take 5,000 Units by mouth every morning    ciprofloxacin (CIPRO) 500 MG tablet 10/8/2020 at 0500 Self No Yes   Sig: Take 1 tablet (500 mg) by mouth 2 times daily   clopidogrel (PLAVIX) 75 MG tablet 9/29/2020 at AM Self No Yes   Sig: Take 1 tablet (75 mg) by mouth daily   diphenhydrAMINE (BENADRYL) 25 MG tablet  at PRN Self Yes Yes   Sig: Take 25 mg by mouth nightly as needed for itching or allergies   escitalopram (LEXAPRO) 10 MG tablet 10/7/2020 at 0800 Self Yes Yes   Sig: Take 10 mg by mouth every morning (in addition to a 20 mg dose to = 30 mg daily dose)    escitalopram (LEXAPRO) 20 MG tablet 10/7/2020 at 0800 Self Yes Yes   Sig: Take 20 mg by mouth every morning (in addition to a 10 mg dose to = 30 mg daily dose)   ferrous sulfate (FEROSUL) 325 (65 Fe) MG tablet 10/7/2020 at 0800 Self Yes Yes   Sig: Take 325 mg by mouth every morning   oxyCODONE-acetaminophen (PERCOCET) 5-325 MG tablet 10/6/2020 at PRN Self No Yes   Sig: Take 1  tablet by mouth every 6 hours as needed for severe pain   polyethylene glycol (MIRALAX/GLYCOLAX) powder 10/7/2020 at 0800 Self Yes Yes   Sig: Take 1 capful by mouth every morning    prochlorperazine (COMPAZINE) 10 MG tablet 10/8/2020 at 0500 Self No Yes   Sig: TAKE ONE TABLET BY MOUTH EVERY 6 HOURS AS NEEDED FOR NAUSEA / VOMITING   senna-docusate (SENOKOT-S/PERICOLACE) 8.6-50 MG tablet  at HS Self No Yes   Sig: Take 2 tablets by mouth 2 times daily   Patient taking differently: Take 2 tablets by mouth 2 times daily as needed for constipation    tamsulosin (FLOMAX) 0.4 MG capsule 10/7/2020 at 2000 Self No Yes   Sig: Take 1 capsule (0.4 mg) by mouth At Bedtime   vitamin C (ASCORBIC ACID) 1000 MG TABS 10/7/2020 at 0800 Self Yes Yes   Sig: Take 1,000 mg by mouth every morning      Facility-Administered Medications: None     Allergies   Allergies   Allergen Reactions     No Known Drug Allergies        Social History   Michaela Dorman  reports that she quit smoking about 40 years ago. Her smoking use included cigarettes. She has a 30.00 pack-year smoking history. She has never used smokeless tobacco. She reports current alcohol use. She reports that she does not use drugs.    Family History   Family History   Problem Relation Age of Onset     Hypertension Mother      Thyroid Disease Mother      Cerebrovascular Disease Mother      Hypertension Father      Cerebrovascular Disease Father      Cancer Father         skin     Thyroid Disease Sister      Diabetes Brother         type 2     Depression Sister      Breast Cancer Sister         age 47     Cancer Sister         skin     Cancer Brother         inside nose       Review of Systems   The 10 point Review of Systems is negative other than noted in the HPI or here.     Physical Exam   Temp: 98.8  F (37.1  C) Temp src: Temporal BP: 137/79 Pulse: 73   Resp: 16 SpO2: 95 % O2 Device: None (Room air)    Vital Signs with Ranges  Temp:  [98.8  F (37.1  C)] 98.8  F (37.1   C)  Pulse:  [73-80] 73  Resp:  [16] 16  BP: (137-143)/(73-79) 137/79  SpO2:  [95 %] 95 %  135 lbs 0 oz    Constitutional: awake, alert, cooperative, no apparent distress, and appears stated age  Eyes: Lids and lashes normal, pupils equal, round and reactive to light, extra ocular muscles intact, sclera clear, conjunctiva normal  ENT: normocepalic, without obvious abnormality, oropharynx pink and moist  Hematologic / Lymphatic: no lymphadenopathy  Respiratory: No increased work of breathing, good air exchange, clear to auscultation bilaterally, no crackles or wheezing  Cardiovascular: regular rate and rhythm, normal S1 and S2 and no murmur noted  GI: Normal bowel sounds, soft, non-distended, non-tender  Skin: no redness, warmth, or swelling, no rashes. Surgical site is c/d/i.   Musculoskeletal: There is no redness, warmth, or swelling of the joints.  Full range of motion noted.  Motor strength is 5 out of 5 all extremities bilaterally.  Tone is normal.  Neurologic: Awake, alert, oriented to name, place and time.  Cranial nerves II-XII are grossly intact.  Motor is 5 out of 5 bilaterally.    Neuropsychiatric:  Normal affect, memory, insight.      Data   Most Recent 3 CBC's:  Recent Labs   Lab Test 10/01/20  1139 08/31/20  1241 07/16/20  1051   WBC 9.2 8.1 7.1   HGB 11.3* 13.1 11.5*   * 101* 99    269 284     Most Recent 3 BMP's:  Recent Labs   Lab Test 10/08/20  0606 10/01/20  1139 08/31/20  1241    137 137   POTASSIUM 3.5 5.1 4.3   CHLORIDE 105 103 103   CO2 29 28 28   BUN 21 26 26   CR 0.89 1.13* 1.13*   ANIONGAP 5 6 6   VANDANA 9.4 9.8 10.4*   GLC 86 91 91     Most Recent 3 INR's:  Recent Labs   Lab Test 10/01/20  1139 02/12/18  0917   INR 0.9 0.88     Most Recent Cholesterol Panel:  Recent Labs   Lab Test 10/08/20  0606   CHOL 199   LDL 87   HDL 76   TRIG 180*     Most Recent Hemoglobin A1c:  Recent Labs   Lab Test 10/08/20  0606   A1C 5.2

## 2020-10-08 NOTE — ANESTHESIA CARE TRANSFER NOTE
Patient: Michaela Dorman    Procedure(s):  LEFT CAROTID ENDARTERECTOMY WITH EEG    Diagnosis: Symptomatic stenosis of left carotid artery [I65.22]  Diagnosis Additional Information: No value filed.    Anesthesia Type:   General, ETT     Note:  Airway :Face Mask  Patient transferred to:PACU  Comments: To PACU: Arouses easily, good airway, 02 face mask, VSS  Follows simple commands & moves all extremities  Report to RNHandoff Report: Identifed the Patient, Identified the Reponsible Provider, Reviewed the pertinent medical history, Discussed the surgical course, Reviewed Intra-OP anesthesia mangement and issues during anesthesia, Set expectations for post-procedure period and Allowed opportunity for questions and acknowledgement of understanding      Vitals: (Last set prior to Anesthesia Care Transfer)    CRNA VITALS  10/8/2020 1052 - 10/8/2020 1131      10/8/2020             Pulse:  63    ART BP:  151/64    ART Mean:  97    Ht Rate:  63    SpO2:  100 %                Electronically Signed By: LISA Rutherford CRNA  October 8, 2020  11:31 AM

## 2020-10-08 NOTE — PROVIDER NOTIFICATION
Paged Vascular surgery-  Pt states Cipro not working wondering if she can have a different antibiotic, as pt is super uncomfortable  -- Spoke to Jesus Fernandez- Okay to switch to Bactrum

## 2020-10-08 NOTE — OP NOTE
Procedure Date: 10/08/2020      PREOPERATIVE DIAGNOSIS:  Asymptomatic 80% left carotid stenosis.      POSTOPERATIVE DIAGNOSIS:  Asymptomatic 80% left carotid stenosis.      PROCEDURE PERFORMED:  Left carotid endarterectomy with bovine pericardial patch angioplasty.      SURGEON:  Lacho Jasmine MD       FIRST ASSISTANT:  Alan Russ MD, [facility] Surgery resident.      ANESTHESIA:  General endotracheal anesthesia.      ESTIMATED BLOOD LOSS:  50 mL      OPERATIVE INDICATIONS:  This patient is a 70-year-old female recently noted to have an asymptomatic 80% left carotid stenosis on CT angiogram as worked up for a headache.  She has no personal history of stroke, TIA, or amaurosis.  After a discussion as to the natural history and potential treatment options for carotid stenoses, she has opted to proceed with left carotid endarterectomy.      OPERATIVE FINDINGS:  There was a focal, somewhat irregular and calcified 80% plaque involving the distal common carotid and the origins of the external and internal carotid arteries.  Beyond that point, the internal carotid was soft and disease-free.  At the completion of this procedure, this patient was following commands with all 4 extremities and neurologically intact.      DESCRIPTION OF TECHNIQUE:  After informed consent was obtained, the patient was brought to the operating room and placed on the table in a supine position.  General endotracheal anesthesia was achieved without incident.  A Quinones catheter was placed, and EEG leads were also placed.  Her left neck was prepped and draped in the usual sterile fashion.  I began with an oblique left-sided sternocleidomastoid incision.  Dissection proceeded sharply downward to isolate the mid cervical common carotid just above the omohyoid muscle.  The vagus nerve was identified posterior to the carotid and carefully avoided throughout the remainder of this procedure.  Dissection continued distally up the common carotid.   A crossing facial vein was divided between silk ties.  Dissection continued to the carotid bifurcation.  The superior thyroidal and external carotid arteries were dissected free and encircled with vessel loops.  Dissection continued distally up the internal carotid.  The ansa was traced cephalad to its confluence with the hypoglossal nerve.  The ansa was divided between silk ties at that level, and I somewhat mobilize the hypoglossal nerve but took great care to avoid any injury to that structure.  Distal control of the internal carotid was achieved with a vessel loop.  The patient was systemically heparinized with 5000 units of intravenous heparin.  After a 5-minute circulation time, distal and proximal control was achieved.  There were no EEG changes.  An arteriotomy was made on the mid cervical common carotid and extended up the internal carotid to a point above the level of the disease.  She did not require placement of a shunt.  The endarterectomy plane was developed using a black spatula.  I scored the intima proximally and distally with a Clarion blade.  We obtained excellent proximal and distal taper points.  We performed eversion endarterectomy on the external carotid artery.  The plaque was removed from the field en bloc.  Residual strands of fibrinous debris were removed from the endarterectomized surface until I was fully satisfied with its quality.  The distal and proximal taper points were tacked down with a few interrupted 7-0 Prolene sutures.  A bovine pericardial patch was prepared.  We proceeded with patch angioplasty using running 6-0 Prolene suture.  This was performed circumferentially.  Prior to placing the final stitches in the patch angioplasty suture line, all vessel loops and clamps were briefly released to flush any debris out of the endarterectomized surface.  The lumen was copiously irrigated with heparinized saline and found to be clear.  The remaining stitches were placed at the patch  angioplasty suture line, and it was secured.  Flow was preferentially directed up the external carotid for several heartbeats before relinquishing control of the distal internal carotid.  Immediately noted was a palpable pulse in the internal carotid distal to our patch.  We had triphasic Doppler signals in the distal internal carotid and external carotid arteries.  I chose to accept this.  Surgicel gauze and gentle compression were held over the incision for about 10 minutes.  Following this, we had excellent hemostasis.  Closure then proceeded utilizing interrupted 2-0 Vicryl to reapproximate deep cervical fascia.  Platysma was closed with a running 3-0 Vicryl suture.  Skin was closed with a 4-0 Monocryl running subcuticular stitch.  Steri-Strips and a sterile dressing were applied.  Final sponge and needle counts were reported as correct.  The patient tolerated the procedure without incident.  She was extubated.  She was observed to follow commands with all 4 extremities.  Her smile was symmetric, and her tongue was in the midline.  She was returned awake and hemodynamically stable to the recovery room.         WARREN AMBROSE MD             D: 10/08/2020   T: 10/08/2020   MT:       Name:     NICHOLAS DONNELLY   MRN:      [id number]        Account:        [id number]   :      1950           Procedure Date: 10/08/2020      Document: [id number]

## 2020-10-08 NOTE — BRIEF OP NOTE
[facility]    Brief Operative Note    Pre-operative diagnosis: Asymptomatic stenosis of left carotid artery [I65.22]  Post-operative diagnosis Same as pre-operative diagnosis    Procedure: Procedure(s):  LEFT CAROTID ENDARTERECTOMY WITH EEG  Surgeon: Surgeon(s) and Role:     * Lacho Jasmine MD - Primary     * Alan Russ MD - Resident - Assisting  Anesthesia: General   Estimated blood loss: Less than 50 ml  Drains: None  Specimens:   ID Type Source Tests Collected by Time Destination   1 : Plaque from Left Carotid Tissue Other OR DOCUMENTATION ONLY Lacho Jasmine MD 10/8/2020 10:37 AM      Findings:   plaque at carotid bifurcation; neuro intact post-op.  Complications: None.  Implants:   Implant Name Type Inv. Item Serial No.  Lot No. LRB No. Used Action   IMP PATCH PERICARDIUM PHOTOFIX BOVINE 0.8X8CM [id number] Bone/Tissue/Biologic IMP PATCH PERICARDIUM PHOTOFIX BOVINE 0.8X8CM [id number]  [facility] [id number] Left 1 Implanted

## 2020-10-08 NOTE — ANESTHESIA PREPROCEDURE EVALUATION
Anesthesia Pre-Procedure Evaluation    Patient: Michaela Dorman   MRN: [id number] : 1950          Preoperative Diagnosis: Symptomatic stenosis of left carotid artery [I65.22]    Procedure(s):  LEFT CAROTID ENDARTERECTOMY WITH EEG    Past Medical History:   Diagnosis Date     Carotid stenosis, bilateral L80%, R40% 2020     Central stenosis of spinal canal 2017    lumbar      DDD (degenerative disc disease), lumbar 2017     Depressive disorder, not elsewhere classified      Esophageal reflux      ETOH abuse     in remission     Foraminal stenosis of lumbar region 2017    Complete lumbar spine left at various degrees and to a lesser extent the right as well.     Lumbar radiculopathy 2017     Prophylactic antibiotic for dental procedure indicated due to prior joint replacement 2017     S/P reverse total shoulder arthroplasty, right 2017     Subdural hematoma (H)      Past Surgical History:   Procedure Laterality Date     ARTHROPLASTY SHOULDER Right 2015     ARTHROSCOPY KNEE  2012    Procedure:ARTHROSCOPY KNEE; right knee arthroscopy with partial lateral menisectomy; Surgeon:DAMIÁN MCALLISTER; Location:[facility]     C LIGATE FALLOPIAN TUBE       COLONOSCOPY N/A 2017    Procedure: COLONOSCOPY;  colonoscopy;  Surgeon: Chuck Chand MD;  Location: [facility]     EXCISE MASS AXILLA Left 2016    Procedure: EXCISE MASS AXILLA;  Surgeon: Keyon Blanco MD;  Location: [facility]     HC REMV CATARACT EXTRACAP,INSERT LENS, W/O ECP  2009    Right eye     INJECT EPIDURAL LUMBAR N/A 2019    Procedure: interlaminar epidual steroid injection lumbar 4-5;  Surgeon: Oskar Salvador MD;  [facility]: [facility]     INJECT EPIDURAL LUMBAR Bilateral 2019    Procedure: lumbar 4-5 epidural interlaminar steroid injection;  Surgeon: Oskar Salvador MD;  [facility]     INSERT PORT VASCULAR ACCESS Right 10/7/2016    Procedure: INSERT PORT VASCULAR ACCESS;  Surgeon: Bella  MD Keyon;  [facility]     MASTECTOMY SIMPLE BILATERAL Bilateral 2017    Procedure: MASTECTOMY SIMPLE BILATERAL;  Surgeon: Keyon Blanco MD;  [facility]     OPEN REDUCTION INTERNAL FIXATION FOOT Right 3/20/2015    Procedure: OPEN REDUCTION INTERNAL FIXATION FOOT;  Surgeon: Javi Herrmann DPM;  Location[facility]     OPTICAL TRACKING SYSTEM FUSION SPINE POSTERIOR LUMBAR TWO LEVELS N/A 2020    Procedure: LUMBAR 2-SACRAL1 TRANSFORMINAL INTERBODY FUSION;  Surgeon: Lenny Gomes MD;  Location[facility]     REMOVE PORT VASCULAR ACCESS Right 2018    Procedure: REMOVE PORT VASCULAR ACCESS;  right intra jugular port removal;  Surgeon: Keyon Blanco MD;  [facility]     SHOULDER SURGERY Right 2015     ZZC NONSPECIFIC PROCEDURE      ankle fracture surgery     Allergies   Allergen Reactions     No Known Drug Allergies      Social History     Tobacco Use     Smoking status: Former Smoker     Packs/day: 3.00     Years: 10.00     Pack years: 30.00     Types: Cigarettes     Quit date: 1979     Years since quittin.8     Smokeless tobacco: Never Used     Tobacco comment: approx. 3 packs daily   Substance Use Topics     Alcohol use: Yes     Alcohol/week: 0.0 standard drinks     Comment: daily glass of wine     Prior to Admission medications    Medication Sig Start Date End Date Taking? Authorizing Provider   acetaminophen (TYLENOL) 500 MG tablet Take 1,000 mg by mouth 3 times daily as needed for mild pain (500MG X 2 = 1,000MG)   Yes Reported, Patient   ALPRAZolam (XANAX) 0.5 MG tablet TAKE ONE TABLET BY MOUTH AT BEDTIME AS NEEDED FOR SLEEP 20  Yes Phani Tapia PA-C   aspirin (ASA) 81 MG chewable tablet Take 1 tablet (81 mg) by mouth daily 20  Yes Phani Tapia PA-C   atorvastatin (LIPITOR) 20 MG tablet Take 1 tablet (20 mg) by mouth daily 20  Yes Phani Tapia PA-C   B-12 METHYLCOBALAMIN PO Take 5,000 mcg by mouth every morning   Yes Reported, Patient    celecoxib (CELEBREX) 200 MG capsule Take 1 capsule (200 mg) by mouth 2 times daily 6/16/20  Yes Phani Tapia PA-C   cholecalciferol (VITAMIN D3) 5000 units (125 mcg) capsule Take 5,000 Units by mouth every morning    Yes Reported, Patient   ciprofloxacin (CIPRO) 500 MG tablet Take 1 tablet (500 mg) by mouth 2 times daily 10/1/20  Yes Phani Tapia PA-C   clopidogrel (PLAVIX) 75 MG tablet Take 1 tablet (75 mg) by mouth daily 9/14/20  Yes Keyon Blanco MD   diphenhydrAMINE (BENADRYL) 25 MG tablet Take 25 mg by mouth nightly as needed for itching or allergies   Yes Reported, Patient   escitalopram (LEXAPRO) 10 MG tablet Take 10 mg by mouth every morning (in addition to a 20 mg dose to = 30 mg daily dose)    Yes Reported, Patient   escitalopram (LEXAPRO) 20 MG tablet Take 20 mg by mouth every morning (in addition to a 10 mg dose to = 30 mg daily dose)   Yes Reported, Patient   ferrous sulfate (FEROSUL) 325 (65 Fe) MG tablet Take 325 mg by mouth every morning   Yes Reported, Patient   Garlic 1000 MG CAPS Take 1,000 mg by mouth every morning   Yes Reported, Patient   Lansoprazole (PREVACID PO) Take 40 mg by mouth every morning    Yes Reported, Patient   Magnesium Oxide 500 MG TABS Take 500 mg by mouth every morning   Yes Reported, Patient   oxyCODONE-acetaminophen (PERCOCET) 5-325 MG tablet Take 1 tablet by mouth every 6 hours as needed for severe pain 8/10/20  Yes Phani Tapia PA-C   polyethylene glycol (MIRALAX/GLYCOLAX) powder Take 1 capful by mouth every morning  2/12/20  Yes Val Khan MD   prochlorperazine (COMPAZINE) 10 MG tablet TAKE ONE TABLET BY MOUTH EVERY 6 HOURS AS NEEDED FOR NAUSEA / VOMITING 9/2/20  Yes Phani Tapia PA-C   Psyllium (METAMUCIL FIBER PO) Take 2 teaspoonful by mouth every morning (mix in with liquid and drink)   Yes Reported, Patient   senna-docusate (SENOKOT-S/PERICOLACE) 8.6-50 MG tablet Take 2 tablets by mouth 2 times daily  Patient taking differently: Take 2 tablets by  mouth 2 times daily as needed for constipation  2/12/20  Yes Val Khan MD   tamsulosin (FLOMAX) 0.4 MG capsule Take 1 capsule (0.4 mg) by mouth At Bedtime 4/13/20  Yes Phani Tapia PA-C   vitamin C (ASCORBIC ACID) 1000 MG TABS Take 1,000 mg by mouth every morning   Yes Reported, Patient   Zinc 30 MG CAPS Take 30 mg by mouth every morning    Yes Reported, Patient     No current Epic-ordered facility-administered medications on file.      Current Outpatient Medications Ordered in Epic   Medication     acetaminophen (TYLENOL) 500 MG tablet     ALPRAZolam (XANAX) 0.5 MG tablet     aspirin (ASA) 81 MG chewable tablet     atorvastatin (LIPITOR) 20 MG tablet     B-12 METHYLCOBALAMIN PO     celecoxib (CELEBREX) 200 MG capsule     cholecalciferol (VITAMIN D3) 5000 units (125 mcg) capsule     ciprofloxacin (CIPRO) 500 MG tablet     clopidogrel (PLAVIX) 75 MG tablet     diphenhydrAMINE (BENADRYL) 25 MG tablet     escitalopram (LEXAPRO) 10 MG tablet     escitalopram (LEXAPRO) 20 MG tablet     ferrous sulfate (FEROSUL) 325 (65 Fe) MG tablet     Garlic 1000 MG CAPS     Lansoprazole (PREVACID PO)     Magnesium Oxide 500 MG TABS     oxyCODONE-acetaminophen (PERCOCET) 5-325 MG tablet     polyethylene glycol (MIRALAX/GLYCOLAX) powder     prochlorperazine (COMPAZINE) 10 MG tablet     Psyllium (METAMUCIL FIBER PO)     senna-docusate (SENOKOT-S/PERICOLACE) 8.6-50 MG tablet     tamsulosin (FLOMAX) 0.4 MG capsule     vitamin C (ASCORBIC ACID) 1000 MG TABS     Zinc 30 MG CAPS       Recent Labs   Lab Test 10/01/20  1139 08/31/20  1241    137   POTASSIUM 5.1 4.3   CHLORIDE 103 103   CO2 28 28   ANIONGAP 6 6   GLC 91 91   BUN 26 26   CR 1.13* 1.13*   VANDANA 9.8 10.4*     Recent Labs   Lab Test 10/01/20  1139 08/31/20  1241   WBC 9.2 8.1   HGB 11.3* 13.1    269     Recent Labs   Lab Test 02/04/20  0656   ABO A   RH Pos     RECENT LABS:     Anesthesia Evaluation     . Pt has had prior anesthetic. Type: General (Mac 3 = Grade 1  View last procedure)    No history of anesthetic complications          ROS/MED HX    ENT/Pulmonary:     (+)tobacco use, Past use , . .   (-) asthma, COPD and sleep apnea   Neurologic:       Cardiovascular:     (+) Dyslipidemia, hypertension-range: she was over-treated on her medications with low BP.  Taken off 1 of them a few weeks ago.  THis morning BP in the normal range, ---. : . . . :. . Previous cardiac testing Echodate:8/2017results:EF 52% on imaging scandate: results:ECG reviewed date:1/2020 results:NSR, voltage criteria for LVH, no acute abnormalities date: results:          METS/Exercise Tolerance:  >4 METS   Hematologic:     (+) History of Transfusion no previous transfusion reaction -      Musculoskeletal:   (+)  other musculoskeletal- DJD spine with lumbar radiculopathy      GI/Hepatic:     (+) GERD Asymptomatic on medication,      (-) liver disease   Renal/Genitourinary:     (+) chronic renal disease (stage 3), type: CRI,       Endo:      (-) Type II DM, thyroid disease and chronic steroid usage   Psychiatric:     (+) psychiatric history (ho EtOH abuse in remission) anxiety      Infectious Disease:         Malignancy:   (+) Malignancy History of Breast  Breast CA Remission status post.         Other:                          Physical Exam  Normal systems: cardiovascular and pulmonary    Airway   Mallampati: II  TM distance: >3 FB  Neck ROM: full    Dental   (+) chipped    Cardiovascular   Rhythm and rate: regular and normal      Pulmonary    breath sounds clear to auscultation            Lab Results   Component Value Date    WBC 9.2 10/01/2020    HGB 11.3 (L) 10/01/2020    HCT 35.0 10/01/2020     10/01/2020    SED 9 07/16/2008     10/01/2020    POTASSIUM 5.1 10/01/2020    CHLORIDE 103 10/01/2020    CO2 28 10/01/2020    BUN 26 10/01/2020    CR 1.13 (H) 10/01/2020    GLC 91 10/01/2020    VANDANA 9.8 10/01/2020    ALBUMIN 4.3 10/01/2020    PROTTOTAL 8.0 10/01/2020    ALT 49 10/01/2020    AST 50  "(H) 10/01/2020    GGT 43 (H) 10/06/2011    ALKPHOS 61 10/01/2020    BILITOTAL 0.3 10/01/2020    PTT 27 05/14/2008    INR 0.9 10/01/2020    TSH 1.34 01/24/2017       Preop Vitals  BP Readings from Last 3 Encounters:   10/01/20 120/72   09/22/20 128/78   09/14/20 114/73    Pulse Readings from Last 3 Encounters:   10/01/20 72   09/22/20 73   09/14/20 78      Resp Readings from Last 3 Encounters:   10/01/20 16   09/22/20 16   09/08/20 16    SpO2 Readings from Last 3 Encounters:   10/01/20 97%   09/22/20 96%   09/14/20 100%      Temp Readings from Last 1 Encounters:   10/01/20 37.2  C (98.9  F) (Temporal)    Ht Readings from Last 1 Encounters:   10/01/20 1.689 m (5' 6.5\")      Wt Readings from Last 1 Encounters:   10/01/20 61.2 kg (135 lb)    Estimated body mass index is 21.46 kg/m  as calculated from the following:    Height as of 10/1/20: 1.689 m (5' 6.5\").    Weight as of 10/1/20: 61.2 kg (135 lb).       Anesthesia Plan      History & Physical Review  History and physical reviewed and following examination; no interval change.    ASA Status:  3 .    NPO Status:  > 8 hours    Plan for General and ETT with Intravenous and Propofol induction. Maintenance will be Balanced.    PONV prophylaxis:  Ondansetron (or other 5HT-3) and Dexamethasone or Solumedrol  Additional equipment: 2nd IV and Arterial Line Avoid L arm - hx of breast CA   Remifentanil gtt         Postoperative Care  Postoperative pain management:  IV analgesics and Multi-modal analgesia.      Consents  Anesthetic plan, risks, benefits and alternatives discussed with:  Patient and Spouse..                 Archie Ding MD  "

## 2020-10-08 NOTE — CONSULTS
Hospitalist note    HOSPITALIST CONSULT CHART CHECK:    Hospitalist service was consulted for after hours cross coverage only. We will peripherally follow and chart check.     - For medical concerns during business hours, call the [facility] at [phone] to have the rounding/on call Vascular Medicine (NOT VASCULAR SURGERY) MD paged.    - After business hours, for medical concerns on this patient, please page Hospitalist staff.    - For vascular surgical questions, please page the appropriate surgeon (primary vascular surgeon or on call vascular surgeon) based upon the time of day.     Blanca Orourke [facility]  Hospitalist - [facility]  [phone]     Text Page

## 2020-10-08 NOTE — PROGRESS NOTES
Vascular Update:    Postop check.  Patient reports doing well.  Minimal pain currently.  Dressing c/d/i.  Moving arms and legs.  Good 5/5  strength bilaterally.  Sticks out tongue.  No deviation.  Speech intact.  No speech issues.  No vision issues.  Resumed ASA (home medication).  Will need to be on ASA for life.  Giving 5 days of Bactrim for concern of UTI which she presented with, with symptoms of burning with urination and UA that has nitrites.  She follows with a urology team for frequent UTIs.  If still symptoms after 5 days bactrim then she should follow up with the urology team outpatient to discuss further.  Anticipate possible discharge to home tomorrow if doing well over night.    Luke Pesonen  Vascular Surgery Fellow

## 2020-10-09 VITALS
WEIGHT: 138.6 LBS | HEART RATE: 70 BPM | OXYGEN SATURATION: 94 % | DIASTOLIC BLOOD PRESSURE: 72 MMHG | TEMPERATURE: 98 F | BODY MASS INDEX: 22.28 KG/M2 | HEIGHT: 66 IN | SYSTOLIC BLOOD PRESSURE: 132 MMHG | RESPIRATION RATE: 12 BRPM

## 2020-10-09 LAB — INTERPRETATION ECG - MUSE: NORMAL

## 2020-10-09 PROCEDURE — 250N000013 HC RX MED GY IP 250 OP 250 PS 637: Performed by: STUDENT IN AN ORGANIZED HEALTH CARE EDUCATION/TRAINING PROGRAM

## 2020-10-09 PROCEDURE — 250N000013 HC RX MED GY IP 250 OP 250 PS 637: Performed by: SURGERY

## 2020-10-09 PROCEDURE — 99233 SBSQ HOSP IP/OBS HIGH 50: CPT | Performed by: INTERNAL MEDICINE

## 2020-10-09 PROCEDURE — 250N000013 HC RX MED GY IP 250 OP 250 PS 637: Performed by: INTERNAL MEDICINE

## 2020-10-09 RX ADMIN — Medication 400 MG: at 09:11

## 2020-10-09 RX ADMIN — OXYCODONE HYDROCHLORIDE 5 MG: 5 TABLET ORAL at 04:56

## 2020-10-09 RX ADMIN — PSYLLIUM HUSK 1 PACKET: 3.4 POWDER ORAL at 08:09

## 2020-10-09 RX ADMIN — OXYCODONE HYDROCHLORIDE 5 MG: 5 TABLET ORAL at 09:12

## 2020-10-09 RX ADMIN — ATORVASTATIN CALCIUM 20 MG: 10 TABLET, FILM COATED ORAL at 08:10

## 2020-10-09 RX ADMIN — ASPIRIN 81 MG: 81 TABLET, COATED ORAL at 08:10

## 2020-10-09 RX ADMIN — FERROUS SULFATE TAB 325 MG (65 MG ELEMENTAL FE) 325 MG: 325 (65 FE) TAB at 09:11

## 2020-10-09 RX ADMIN — PHENAZOPYRIDINE HYDROCHLORIDE 100 MG: 100 TABLET ORAL at 04:56

## 2020-10-09 RX ADMIN — PANTOPRAZOLE SODIUM 40 MG: 40 TABLET, DELAYED RELEASE ORAL at 08:11

## 2020-10-09 RX ADMIN — ESCITALOPRAM OXALATE 30 MG: 10 TABLET ORAL at 08:10

## 2020-10-09 RX ADMIN — OXYCODONE HYDROCHLORIDE 5 MG: 5 TABLET ORAL at 00:42

## 2020-10-09 RX ADMIN — OXYCODONE HYDROCHLORIDE AND ACETAMINOPHEN 1000 MG: 500 TABLET ORAL at 09:11

## 2020-10-09 RX ADMIN — ACETAMINOPHEN 975 MG: 325 TABLET, FILM COATED ORAL at 04:56

## 2020-10-09 RX ADMIN — POLYETHYLENE GLYCOL 3350 17 G: 17 POWDER, FOR SOLUTION ORAL at 08:09

## 2020-10-09 RX ADMIN — SULFAMETHOXAZOLE AND TRIMETHOPRIM 1 TABLET: 400; 80 TABLET ORAL at 08:11

## 2020-10-09 RX ADMIN — ZINC SULFATE 220 MG (50 MG) CAPSULE 220 MG: CAPSULE at 09:11

## 2020-10-09 RX ADMIN — CHOLECALCIFEROL TAB 125 MCG (5000 UNIT) 125 MCG: 125 TAB at 09:11

## 2020-10-09 RX ADMIN — PHENAZOPYRIDINE HYDROCHLORIDE 100 MG: 100 TABLET ORAL at 09:12

## 2020-10-09 ASSESSMENT — ACTIVITIES OF DAILY LIVING (ADL)
ADLS_ACUITY_SCORE: 15

## 2020-10-09 ASSESSMENT — MIFFLIN-ST. JEOR: SCORE: 1160.44

## 2020-10-09 NOTE — PROGRESS NOTES
HOSPITALIST CHART CHECK:    Hospitalist service was consulted for after hours cross coverage only. We will peripherally follow and chart check.     - For medical concerns during business hours, call the [facility] at [phone] to have the rounding/on call Vascular Medicine (NOT VASCULAR SURGERY) MD paged.    - After business hours, for medical concerns on this patient, please page Hospitalist staff.    - For vascular surgical questions, please page the appropriate surgeon (primary vascular surgeon or on call vascular surgeon) based upon the time of day.     Blanca Orourke [facility]  Hospitalist - [place] [facility]  [phone]     Text Page

## 2020-10-09 NOTE — PROGRESS NOTES
"Vascular Progress Note:     S: doing well, no complaints, ready to go home     O:   /66   Pulse 63   Temp 98  F (36.7  C) (Oral)   Resp 12   Ht 1.676 m (5' 6\")   Wt 62.9 kg (138 lb 9.6 oz)   SpO2 90%   BMI 22.37 kg/m    General: appears well and not in distress   CV: regular rate and rhythm   Resp: normal respiratory effort   Abd: soft, no distenstion  Extremities: warm and well perfused   Neck: incision c/d/i   Neuro: 5/5 strength in bilateral upper and lower extremities, sensation intact, CNII-XII grossly intact, tongue midline     A/P:   POD#1 from [facility] for asymptomatic high grade carotid stenosis. Doing well.     -plan for home today   -baby ASA and statin indefinitely ; stop plavix   -Zetia added  -f/u lipid panel in 3 months with PCP   -continue bactrim for likely UTI, follow-up with PCP or urology if no improvement     Alan Russ MD  General Surgery Resident, PGY-4   P: [phone]     I have seen and examined this patient with the Vascular Resident. I was involved with the assessment and plan, and I agree with the findings and plan of care as documented in the resident's note.   Cale Jasmine MD    "

## 2020-10-09 NOTE — PLAN OF CARE
IMC discontinued at 6:00am. A/Ox4. VSS. LS clear. Bs normoactive, +flatus. Adequate urine output. Quinones removed at 6:25am DTV. Dressing to neck cdi. Managing pain with prn oxycodone, and scheduled tylenol. Tolerating diet. Up with sba. Plan to discharge today.

## 2020-10-09 NOTE — PLAN OF CARE
IMC status. A&O, VSS on room air. Neuros intact. Lung sounds clear. Bowel sounds hypoactive, Quinones in place w/ adequate urine output, Left neck incision CDI. Bedrest. Tolerating regular diet. Pain controlled by PRN oxycodone.

## 2020-10-09 NOTE — PROGRESS NOTES
[facility]    Vascular Medicine Progress Note    Date of Service (when I saw the patient): 10/09/2020     Assessment & Plan   Michaela Dorman is a 70 year old female who was admitted on 10/8/2020.     1. Asymptomatic high-grade (80%) stenosis of left carotid artery s/p left carotid endarterectomy    POD 1  Doing well  No neuro deficits  Ambulating,  voided  Post-operative cares per Dr. Jasmine / Vascular Surgery.   She had been on Aspirin and Plavix prior to admission.   Plavix had just been initiated on 9/14/20 by Dr. Blanco with the new finding of high grade carotid stenosis and significant plaque.  Now post surgery ASA alone per [facility] surgery   Follow up with Dr. Jasmine post op check    2. Hyperlipidemia       Her lipid panel this admission was significant for an LDL of 87, HDL 76, triglycerides 180, and total cholesterol 199 while on Atorvastatin 20 mg daily. Given her high grade carotid stenosis, she should optimally be treated more aggressively to an LDL of less than 70. She is unlikely to reach this goal by increasing Atorvastatin or switching to Crestor. Therefore, will add Zetia 10 mg daily. She should then have a lipid panel repeated in 3 months through her primary care provider to ascertain whether or not her lipids are at goal on this regimen.          Duong Wilson MD,[place], [facility]  Vascular Medicine    Interval History   Doing well  Ambulating no neuro deficits  Voided  On asa        Physical Exam   Temp: 98  F (36.7  C) Temp src: Oral BP: 132/72 Pulse: 70   Resp: 12 SpO2: 94 % O2 Device: None (Room air) Oxygen Delivery: 3 LPM  Vitals:    10/08/20 0601 10/09/20 0639   Weight: 61.2 kg (135 lb) 62.9 kg (138 lb 9.6 oz)     Vital Signs with Ranges  Temp:  [97.6  F (36.4  C)-98.2  F (36.8  C)] 98  F (36.7  C)  Pulse:  [52-70] 70  Resp:  [8-16] 12  BP: (119-140)/(57-76) 132/72  MAP:  [70 mmHg-84 mmHg] 70 mmHg  Arterial Line BP: (120-139)/(45-55) 120/45  SpO2:  [90 %-100 %]  94 %  I/O last 3 completed shifts:  In: 3116 [P.O.:480; I.V.:2636]  Out: 3900 [Urine:3850; Blood:50]    Constitutional: Awake, alert, cooperative, no apparent distress, and appears stated age.  Eyes: Lids and lashes normal, pupils equal, round and reactive to light, extra ocular muscles intact, sclera clear, conjunctiva normal.  ENT: Normocephalic, without obvious abnormality, surgical site dressing intact Dry  Respiratory: No increased work of breathing, good air exchange, clear to auscultation bilaterally, no crackles or wheezing.  Cardiovascular: Normal apical impulse, regular rate and rhythm, normal S1 and S2, no S3 or S4, and no murmur noted.  GI:  normal bowel sounds, soft, non-distended, non-tender, no masses palpated, no hepatosplenomegaly.  Lymph/Hematologic: No cervical lymphadenopathy and no supraclavicular lymphadenopathy.  Genitourinary:  Voided   Skin: post surgical changes  Musculoskeletal: There is no redness, warmth, or swelling of the joints.  Full range of motion noted.  Motor strength is 5 out of 5 all extremities bilaterally.  Tone is normal.  Neurologic: Awake, alert, oriented to name, place and time.  Cranial nerves II-XII are grossly intact.  Motor is 5 out of 5 bilaterally.   Neuropsychiatric: Calm, normal eye contact, alert, normal affect, oriented to self, place, time and situation, memory for past and recent events intact and thought process normal.  Pulses:  Good pulses    Medications     BETA BLOCKER NOT PRESCRIBED       sodium chloride Stopped (10/09/20 0200)       acetaminophen  975 mg Oral Q8H     aspirin  81 mg Oral Daily     atorvastatin  20 mg Oral Daily     escitalopram  30 mg Oral QAM     ezetimibe  10 mg Oral At Bedtime     ferrous sulfate  325 mg Oral QAM     magnesium oxide  400 mg Oral QAM     pantoprazole  40 mg Oral QAM AC     polyethylene glycol  17 g Oral QAM     psyllium  1 packet Oral QAM     sodium chloride (PF)  3 mL Intracatheter Q8H     sodium chloride (PF)  3 mL  Intracatheter Q8H     sulfamethoxazole-trimethoprim  1 tablet Oral BID     tamsulosin  0.4 mg Oral At Bedtime     vitamin C  1,000 mg Oral QAM     Vitamin D3  125 mcg Oral QAM     zinc sulfate  220 mg Oral QAM       Data   Recent Labs   Lab 10/08/20  0606      POTASSIUM 3.5   CHLORIDE 105   CO2 29   BUN 21   CR 0.89   ANIONGAP 5   VANDANA 9.4   GLC 86

## 2020-10-09 NOTE — DISCHARGE SUMMARY
[facility] Discharge Summary    Michaela Dorman MRN# [id number]   Age: 70 year old YOB: 1950     Date of Admission:  10/8/2020  Date of Discharge:  10/9/2020  Admitting Provider:  Lacho Jasmine MD  Discharge Provider:  Lacho Jasmine MD   Discharging Service: Vascular Surgery      Primary Provider: Phani Tapia  Primary Care Physician Phone Number: [phone]          Admission Diagnoses:   Principle Diagnosis: Asymptomatic stenosis of left carotid artery [I65.22]  Secondary Diagnoses:          Discharge Diagnosis:     Asymptomatic stenosis of left carotid artery [I65.22]          Procedures:     Procedure(s): 10/8/2020 - Left Carotid Endarterectomy             Discharge Medications:     Current Facility-Administered Medications   Medication     [START ON 10/11/2020] acetaminophen (TYLENOL) tablet 650 mg     acetaminophen (TYLENOL) tablet 975 mg     ALPRAZolam (XANAX) tablet 0.5 mg     aspirin EC tablet 81 mg     atorvastatin (LIPITOR) tablet 20 mg     benzocaine-menthol (CHLORASEPTIC) 6-10 MG lozenge 1-2 lozenge     bisacodyl (DULCOLAX) Suppository 10 mg     diphenhydrAMINE (BENADRYL) capsule 25 mg     escitalopram (LEXAPRO) tablet 30 mg     ezetimibe (ZETIA) tablet 10 mg     ferrous sulfate (FEROSUL) tablet 325 mg     HYDROmorphone (PF) (DILAUDID) injection 0.3-0.5 mg     labetalol (NORMODYNE/TRANDATE) injection 10 mg     lidocaine (LMX4) cream     lidocaine (LMX4) cream     lidocaine 1 % 0.1-1 mL     lidocaine 1 % 0.1-1 mL     magnesium oxide (MAG-OX) tablet 400 mg     melatonin tablet 1 mg     naloxone (NARCAN) injection 0.1-0.4 mg     ondansetron (ZOFRAN-ODT) ODT tab 4 mg    Or     ondansetron (ZOFRAN) injection 4 mg     oxyCODONE (ROXICODONE) tablet 5-10 mg     pantoprazole (PROTONIX) EC tablet 40 mg     phenazopyridine (PYRIDIUM) tablet 100 mg     polyethylene glycol (MIRALAX) Packet 17 g     polyethylene glycol (MIRALAX) Packet 17 g     prochlorperazine (COMPAZINE)  injection 5 mg    Or     prochlorperazine (COMPAZINE) tablet 5 mg    Or     prochlorperazine (COMPAZINE) suppository 12.5 mg     psyllium (METAMUCIL/KONSYL) Packet 1 packet     Reason beta blocker order not selected     senna-docusate (SENOKOT-S/PERICOLACE) 8.6-50 MG per tablet 2 tablet     sodium chloride (PF) 0.9% PF flush 3 mL     sodium chloride (PF) 0.9% PF flush 3 mL     sodium chloride (PF) 0.9% PF flush 3 mL     sodium chloride (PF) 0.9% PF flush 3 mL     sodium chloride 0.9% infusion     sulfamethoxazole-trimethoprim (BACTRIM) 400-80 MG per tablet 1 tablet     tamsulosin (FLOMAX) capsule 0.4 mg     vitamin C (ASCORBIC ACID) tablet 1,000 mg     Vitamin D3 (CHOLECALCIFEROL) tablet 125 mcg     zinc sulfate (ZINCATE) capsule 220 mg           Allergies:         Allergies   Allergen Reactions     No Known Drug Allergies              Brief History of Illness:   Michaela Dorman is a 71 yo female who was noted to have 80% stenosis of her left carotid artery on work-up for a headache.     After discussing the risks, benefits, and possible complications, informed consent was obtained and the patient underwent the above procedure.  There were no complications.  Please see the Operative Report for full details.           Hospital Course:   Michaela Dorman's hospital course was unremarkable.  She recovered as anticipated and experienced no post-operative complications. She will remain on ASA and stop plavix. She will continue her statin and start on Zetia. She will f/u with Dr Jasmine in 2-3 weeks.     On the date of discharge, the patient was discharged to home in stable condition and afebrile.  She verbalized understanding of all discharge instructions and felt comfortable with the discharge plan.  She was asked to call with any further questions or concerns.         Condition on Discharge:        Discharge condition: Stable   Discharge vitals: Blood pressure 119/66, pulse 63, temperature 98  F (36.7  C), temperature  "source Oral, resp. rate 12, height 1.676 m (5' 6\"), weight 62.9 kg (138 lb 9.6 oz), SpO2 90 %, not currently breastfeeding.           Discharge Disposition:     Discharged to home          Discharge Instructions and Follow-Up:      Michaela Dorman was asked to follow up with Dr Jasmine in 2-3 weeks.     Alan Russ MD   Pager: [phone]     I have seen and examined this patient with the Vascular Resident.  I was involved with the assessment and plan, and I agree with the findings and plan of care as documented in the resident's note.   Keep incision covered for 48 hrs post op.  May remove dressing tomorrow leaving steri strips intact.  No driving for 1 week.  Cale Jasmine MD    "

## 2020-10-09 NOTE — PLAN OF CARE
A&O, VSS on room air. Neuros intact. Lung sounds clear. Bowel sounds hypoactive, adequate urine output. Left neck incision CDI. Ambulates independently. Tolerating regular diet. Pain controlled by PRN oxycodone. Discharge instructions given & questions answered. Pt discharged to home with medications

## 2020-10-12 ENCOUNTER — TELEPHONE (OUTPATIENT)
Dept: FAMILY MEDICINE | Facility: OTHER | Age: 70
End: 2020-10-12

## 2020-10-12 NOTE — TELEPHONE ENCOUNTER
"Hospital/TCU/ED for chronic condition Discharge Protocol    \"Hi, my name is Chely Feliciano RN, a registered nurse, and I am calling from [facility].  I am calling to follow up and see how things are going for you after your recent emergency visit/hospital/TCU stay.\"    Tell me how you are doing now that you are home?\" patient feeling well.      Discharge Instructions    \"Let's review your discharge instructions.  What is/are the follow-up recommendations?  Pt. Response: Follow up with Dr. Jasmine.    \"Has an appointment with your primary care provider been scheduled?\"   No f/u up vascular, appts scheduled    \"When you see the provider, I would recommend that you bring your medications with you.\"    Medications    \"Tell me what changed about your medicines when you discharged?\"    Changes to chronic meds?    0-1    \"What questions do you have about your medications?\"    None     New diagnoses of heart failure, COPD, diabetes, or MI?    No              Post Discharge Medication Reconciliation Status: discharge medications reconciled, continue medications without change.    Was MTM referral placed (*Make sure to put transitions as reason for referral)?   No    Call Summary    \"What questions or concerns do you have about your recent visit and your follow-up care?\"     none    \"If you have questions or things don't continue to improve, we encourage you contact us through the main clinic number (give number).  Even if the clinic is not open, triage nurses are available 24/7 to help you.     We would like you to know that our clinic has extended hours (provide information).  We also have urgent care (provide details on closest location and hours/contact info)\"      \"Thank you for your time and take care!\"             "

## 2020-10-12 NOTE — TELEPHONE ENCOUNTER
Patient had a carotid endarterectomy.  Feel free to follow-up as needed.  Electronically signed:    Phani Jason PA-C

## 2020-10-12 NOTE — TELEPHONE ENCOUNTER
Reason for follow up call: Michaela Dorman appeared on our list for being seen in and recenlty discharge from the Emergency Room/In Patient Hospital Admission.    Chief Complaint   Patient presents with     Hospital F/U   10/08/20- Stenosis of left carotid artery    Encounter routed for Clinic Triage RN to call for follow up    Merrick Hutchinson RN  October 12, 2020

## 2020-10-14 ENCOUNTER — VIRTUAL VISIT (OUTPATIENT)
Dept: URGENT CARE | Facility: CLINIC | Age: 70
End: 2020-10-14
Payer: COMMERCIAL

## 2020-10-14 ENCOUNTER — NURSE TRIAGE (OUTPATIENT)
Dept: NURSING | Facility: CLINIC | Age: 70
End: 2020-10-14

## 2020-10-14 DIAGNOSIS — R05.9 COUGH: ICD-10-CM

## 2020-10-14 DIAGNOSIS — R52 BODY ACHES: Primary | ICD-10-CM

## 2020-10-14 DIAGNOSIS — R53.83 OTHER FATIGUE: ICD-10-CM

## 2020-10-14 DIAGNOSIS — R43.0 LOSS OF SMELL: ICD-10-CM

## 2020-10-14 DIAGNOSIS — R43.2 LOSS OF TASTE: ICD-10-CM

## 2020-10-14 PROCEDURE — 99207 PR NO CHARGE LOS: CPT | Performed by: NURSE PRACTITIONER

## 2020-10-14 NOTE — TELEPHONE ENCOUNTER
"Patient calling requesting COVID 19 testing. Reporting symptoms starting yesterday with \"severe fatigue, loss of taste and smell.\" Reporting dry cough. Afebrile. Patient describes some shortness of breath during the night. Patient reports left carotid surgery 10/8/20 stating she is taking 1 Oxycodone for pain control.   Denies any chest pain or difficulty breathing during triage.    Transferred to Central Scheduling, appointment scheduled for 1020 a.m. this morning.    Nesha Jarrett RN  [facility]        COVID 19 Nurse Triage Plan/Patient Instructions    Please be aware that novel coronavirus (COVID-19) may be circulating in the community. If you develop symptoms such as fever, cough, or SOB or if you have concerns about the presence of another infection including coronavirus (COVID-19), please contact your health care provider or visit [email or web address].     Disposition/Instructions    Virtual Visit with provider recommended. Reference Visit Selection Guide.    Thank you for taking steps to prevent the spread of this virus.  o Limit your contact with others.  o Wear a simple mask to cover your cough.  o Wash your hands well and often.    Resources    [facility]: About COVID-19: [email or web address]    CDC: What to Do If You're Sick: www.cdc.gov/coronavirus/2019-ncov/about/steps-when-sick.html    CDC: Ending Home Isolation: www.cdc.gov/coronavirus/2019-ncov/hcp/disposition-in-home-patients.html     CDC: Caring for Someone: www.cdc.gov/coronavirus/2019-ncov/if-you-are-sick/care-for-someone.html     [facility]: Interim Guidance for Hospital Discharge to Home: www.health.[facility].[place]/diseases/coronavirus/hcp/hospdischarge.pdf    [facility] clinical trials (COVID-19 research studies): clinicalaffairs.[facility]/umn-clinical-trials     Below are the COVID-19 hotlines at the [place] Department of [facility] ([facility]). Interpreters are available.   o For health questions: Call 988-174-1054 or " 1-945.338.8947 (7 a.m. to 7 p.m.)  o For questions about schools and childcare: Call 024-204-1650 or 1-658.896.2663 (7 a.m. to 7 p.m.)                     Reason for Disposition    MILD difficulty breathing (e.g., minimal/no SOB at rest, SOB with walking, pulse <100)    Additional Information    Negative: SEVERE difficulty breathing (e.g., struggling for each breath, speaks in single words)    Negative: Difficult to awaken or acting confused (e.g., disoriented, slurred speech)    Negative: Bluish (or gray) lips or face now    Negative: Shock suspected (e.g., cold/pale/clammy skin, too weak to stand, low BP, rapid pulse)    Negative: Sounds like a life-threatening emergency to the triager    Negative: SEVERE or constant chest pain or pressure (Exception: mild central chest pain, present only when coughing)    Negative: MODERATE difficulty breathing (e.g., speaks in phrases, SOB even at rest, pulse 100-120)    Negative: Patient sounds very sick or weak to the triager    Protocols used: CORONAVIRUS (COVID-19) DIAGNOSED OR [id number] 8.4.20

## 2020-10-14 NOTE — PATIENT INSTRUCTIONS
Patient Education     Coronavirus Disease 2019 (COVID-19): Caring for Yourself or Others   If you or a household member have symptoms of COVID-19, follow the guidelines below. This will help you manage symptoms and keep the virus from spreading.  If you have symptoms of COVID-19    Stay home and contact your care team. They will tell you what to do    Don t panic. Keep in mind that other illnesses can cause similar symptoms.    Stay away from work, school, and public places.    Limit physical contact with others in your home. Limit visitors. No kissing.    Clean surfaces you touch with disinfectant.    If you need to cough or sneeze, do it into a tissue. Then, throw the tissue into the trash. If you don't have tissues, cough or sneeze into the bend of your elbow.    Don t share food or personal items with people in your household. This includes items like eating and drinking utensils, towels, and bedding.    Wear a cloth face mask around other people. It should cover your nose and mouth. You may need to make your own mask using a bandana, T-shirt, or other cloth. See the CDC s instructions on how to make a mask.    If you need to go to a hospital or clinic, call ahead to let them know. Expect the care team to wear masks, gowns, gloves, and eye protection. You may be put in a separate room.    Follow all instructions from your care team.    If you have been diagnosed with COVID-19    Stay home and away from others, including other people in your home. (This is called self-isolation.) Don t leave home unless you need to get medical care. Don't go to work, school, or public places. Don't use buses, taxis, or other public transportation.    Follow all instructions from your care team.    If you need to go to a hospital or clinic, call ahead to let them know. Expect the care team to wear masks, gowns, gloves, and eye protection. You may be put in a separate room.    Wear a face mask over your nose and mouth. This is to  protect others from your germs. If you can t wear a mask, your caregivers should wear one. You may need to make your own mask using a bandana, T-shirt, or other cloth. See the CDC s instructions on how to make a mask.    Have no contact with pets and other animals.    Don t share food or personal items with people in your household. This includes items like eating and drinking utensils, towels, and bedding.    Don t share food or personal items with people in your household. This includes items like eating and drinking utensils, towels, and bedding.    If you need to cough or sneeze, do it into a tissue. Then, throw the tissue into the trash. If you don't have tissues, cough or sneeze into the bend of your elbow.    Wash your hands often.    Self-care at home   At this time, there is no medicine approved to prevent or treat COVID-19. Experts are testing different medicines, trying to find one that works.  So far, the most proven treatment is to support your body while it fights the virus.    Get extra rest.    Drink extra fluids (6 to 8 glasses of liquids each day), unless a doctor has told you not to. Ask your care team which fluids are best for you. Avoid fluids that contain caffeine or alcohol.    Taking over-the-counter (OTC) pain medicine to reduce pain and fever. Your care team will tell you which medicine to use.  If you ve been in the hospital for COVID-19, follow your care team s instructions. They will tell you when to stop self-isolation. They may also have you change positions to help your breathing (such as lying on your belly).  If a test showed that you have COVID-19, you may be asked to donate plasma after you ve recovered. (This is called COVID-19 convalescent plasma donation.) The plasma may contain antibodies to help fight the virus in others. Scientists are testing whether this might be a treatment in the future. For more information, talk to your care team.  Caring for a sick person     Follow  all instructions from the care team.    Wash your hands often.    Wear protective clothing as advised.    Make sure the sick person wears a mask. If they can't wear a mask, don't stay in the same room with them. If you must be in the same room, wear a face mask. Make sure the mask covers both the nose and mouth.    Keep track of the sick person s symptoms.    Clean surfaces often with disinfectant. This includes phones, kitchen counters, fridge door handles, bathroom surfaces, and others.    Don t let anyone share household items with the sick person. This includes eating and drinking tools, towels, sheets, and blankets.    Clean fabrics and laundry well.    Keep other people and pets away from the sick person.    When you can stop self-isolation  When you are sick with COVID-19, you should stay away from other people. This is called self-isolation. The rules for ending self-isolation depend on your health in general.  If you are normally healthy  You can stop self-isolation when all 3 of these are true:  1. You ve had no fever--and no medicine that reduces fever--for at least 24 hours. And   2. Your symptoms are better, such as cough or trouble breathing. And   3. At least 10 days have passed since your symptoms first started.  Talk with your care team before you leave home. They may tell you it s okay to leave, or they may give you different advice.  If you have a weak immune system  If you re being treated for cancer, have an immune disorder (such as HIV), or have had a transplant (organ or bone marrow), follow your care team s instructions. You may be able to end self-isolation when all 3 of these are true:  1. You have no fever without fever-reducing medicine. And   2. Your symptoms are better, such as cough or trouble breathing. And   3. You ve had 2 tests for COVID-19. The tests happened at least 24 hours apart, and both show that you don t have the virus. (If no tests are available, your care team may tell  you to follow the rules for normally healthy people above.)  When you return to public settings  When you are well enough to go outside your home, follow the CDC s guidance on cloth face masks.    Anyone over age 2 should wear a face mask in public, especially when it's hard to socially distance. This includes public transit, public protests and marches, and crowded stores, bars, and restaurants.    Face masks are most likely to reduce the spread of COVID-19 when they are widely used by people who are out in the public.  Certain people should not wear a face covering. These include:     Children under 2 years old    Anyone with a health, developmental, or mental health condition that can be made worse by wearing a mask    Anyone who is unconscious or unable to remove the face covering without help. See the CDC's guidance on who should not wear a face mask.  When to call your care team  Call your care team right away if a sick person has any of these:    Trouble breathing    Pain or pressure in chest  If a sick person has any of these, call 911:    Trouble breathing that gets worse    Pain or pressure in chest that gets worse    Blue tint to lips or face    Fast or irregular heartbeat    Confusion or trouble waking    Fainting or loss of consciousness    Coughing up blood  Going home from the hospital   If you have COVID-19 and were recently in the hospital:    Follow the instructions above for self-care and isolation.    Follow the hospital care team s instructions.    Ask questions if anything is unclear to you. Write down answers so you remember them.  Date last modified: 8/12/2020  [employer] last reviewed this educational content on 4/1/2020 2000-2020 [employer]. [address] [place] [address], [place] [address]. All rights reserved. This information is not intended as a substitute for professional medical care. Always follow your healthcare professional's instructions.

## 2020-10-14 NOTE — PROGRESS NOTES
"Michaela Dorman is a 70 year old female who is being evaluated via a billable telephone visit.      The patient has been notified of following:     \"This telephone visit will be conducted via a call between you and your physician/provider. We have found that certain health care needs can be provided without the need for a physical exam.  This service lets us provide the care you need with a short phone conversation.  If a prescription is necessary we can send it directly to your pharmacy.  If lab work is needed we can place an order for that and you can then stop by our lab to have the test done at a later time.    Telephone visits are billed at different rates depending on your insurance coverage. During this emergency period, for some insurers they may be billed the same as an in-person visit.  Please reach out to your insurance provider with any questions.    If during the course of the call the physician/provider feels a telephone visit is not appropriate, you will not be charged for this service.\"    Patient has given verbal consent for Telephone visit?  Yes    What phone number would you like to be contacted at? [phone]        Subjective     Michaela Dorman is a 70 year old female who presents via phone visit today for the following health issues:    HPI  Yesterday I felt super achy and tired. Usually walk 4-6 miles daily and could not yesterday. At lunch yesterday could not taste or smell anything. This morning I could smell a couple of things. Last night coughing quite a bit but I think I was scared and had sob. Today better with breathing but continue with aches. Has had throat discomfort but it has been since her endarterectomy 10-8-2020 so I believe this is most likely from her ET tube.         Review of Systems positive for body aches, fatigue and cough and Loss of taste and smell. No fever, sob, chest pain, rash, GI or  complaints.         Objective          Vitals:  No vitals were obtained " today due to virtual visit.      PSYCH: Alert and oriented times 3; coherent speech, normal   rate and volume, able to articulate logical thoughts, able   to abstract reason, no tangential thoughts, no hallucinations   or delusions   RESP: No cough, no audible wheezing, able to talk in full sentences  Remainder of exam unable to be completed due to telephone visits            Assessment/Plan:  She understands that should she experience sob or chest pain she needs to go the the nearest ER or call 911. Will continue to self isolate for 10 days and get tested.   Diagnosis Comments   1. Body aches  Symptomatic COVID-19 Virus (Coronavirus) by PCR    2. Other fatigue  Symptomatic COVID-19 Virus (Coronavirus) by PCR    3. Cough  Symptomatic COVID-19 Virus (Coronavirus) by PCR    4. Loss of taste  Symptomatic COVID-19 Virus (Coronavirus) by PCR    5. Loss of smell  Symptomatic COVID-19 Virus (Coronavirus) by PCR            Patient Education     Coronavirus Disease 2019 (COVID-19): Caring for Yourself or Others   If you or a household member have symptoms of COVID-19, follow the guidelines below. This will help you manage symptoms and keep the virus from spreading.  If you have symptoms of COVID-19    Stay home and contact your care team. They will tell you what to do    Don t panic. Keep in mind that other illnesses can cause similar symptoms.    Stay away from work, school, and public places.    Limit physical contact with others in your home. Limit visitors. No kissing.    Clean surfaces you touch with disinfectant.    If you need to cough or sneeze, do it into a tissue. Then, throw the tissue into the trash. If you don't have tissues, cough or sneeze into the bend of your elbow.    Don t share food or personal items with people in your household. This includes items like eating and drinking utensils, towels, and bedding.    Wear a cloth face mask around other people. It should cover your nose and mouth. You may need to  make your own mask using a bandana, T-shirt, or other cloth. See the CDC s instructions on how to make a mask.    If you need to go to a hospital or clinic, call ahead to let them know. Expect the care team to wear masks, gowns, gloves, and eye protection. You may be put in a separate room.    Follow all instructions from your care team.    If you have been diagnosed with COVID-19    Stay home and away from others, including other people in your home. (This is called self-isolation.) Don t leave home unless you need to get medical care. Don't go to work, school, or public places. Don't use buses, taxis, or other public transportation.    Follow all instructions from your care team.    If you need to go to a hospital or clinic, call ahead to let them know. Expect the care team to wear masks, gowns, gloves, and eye protection. You may be put in a separate room.    Wear a face mask over your nose and mouth. This is to protect others from your germs. If you can t wear a mask, your caregivers should wear one. You may need to make your own mask using a bandana, T-shirt, or other cloth. See the CDC s instructions on how to make a mask.    Have no contact with pets and other animals.    Don t share food or personal items with people in your household. This includes items like eating and drinking utensils, towels, and bedding.    Don t share food or personal items with people in your household. This includes items like eating and drinking utensils, towels, and bedding.    If you need to cough or sneeze, do it into a tissue. Then, throw the tissue into the trash. If you don't have tissues, cough or sneeze into the bend of your elbow.    Wash your hands often.    Self-care at home   At this time, there is no medicine approved to prevent or treat COVID-19. Experts are testing different medicines, trying to find one that works.  So far, the most proven treatment is to support your body while it fights the virus.    Get extra  rest.    Drink extra fluids (6 to 8 glasses of liquids each day), unless a doctor has told you not to. Ask your care team which fluids are best for you. Avoid fluids that contain caffeine or alcohol.    Taking over-the-counter (OTC) pain medicine to reduce pain and fever. Your care team will tell you which medicine to use.  If you ve been in the hospital for COVID-19, follow your care team s instructions. They will tell you when to stop self-isolation. They may also have you change positions to help your breathing (such as lying on your belly).  If a test showed that you have COVID-19, you may be asked to donate plasma after you ve recovered. (This is called COVID-19 convalescent plasma donation.) The plasma may contain antibodies to help fight the virus in others. Scientists are testing whether this might be a treatment in the future. For more information, talk to your care team.  Caring for a sick person     Follow all instructions from the care team.    Wash your hands often.    Wear protective clothing as advised.    Make sure the sick person wears a mask. If they can't wear a mask, don't stay in the same room with them. If you must be in the same room, wear a face mask. Make sure the mask covers both the nose and mouth.    Keep track of the sick person s symptoms.    Clean surfaces often with disinfectant. This includes phones, kitchen counters, fridge door handles, bathroom surfaces, and others.    Don t let anyone share household items with the sick person. This includes eating and drinking tools, towels, sheets, and blankets.    Clean fabrics and laundry well.    Keep other people and pets away from the sick person.    When you can stop self-isolation  When you are sick with COVID-19, you should stay away from other people. This is called self-isolation. The rules for ending self-isolation depend on your health in general.  If you are normally healthy  You can stop self-isolation when all 3 of these are  true:  1. You ve had no fever--and no medicine that reduces fever--for at least 24 hours. And   2. Your symptoms are better, such as cough or trouble breathing. And   3. At least 10 days have passed since your symptoms first started.  Talk with your care team before you leave home. They may tell you it s okay to leave, or they may give you different advice.  If you have a weak immune system  If you re being treated for cancer, have an immune disorder (such as HIV), or have had a transplant (organ or bone marrow), follow your care team s instructions. You may be able to end self-isolation when all 3 of these are true:  1. You have no fever without fever-reducing medicine. And   2. Your symptoms are better, such as cough or trouble breathing. And   3. You ve had 2 tests for COVID-19. The tests happened at least 24 hours apart, and both show that you don t have the virus. (If no tests are available, your care team may tell you to follow the rules for normally healthy people above.)  When you return to public settings  When you are well enough to go outside your home, follow the CDC s guidance on cloth face masks.    Anyone over age 2 should wear a face mask in public, especially when it's hard to socially distance. This includes public transit, public protests and marches, and crowded stores, bars, and restaurants.    Face masks are most likely to reduce the spread of COVID-19 when they are widely used by people who are out in the public.  Certain people should not wear a face covering. These include:     Children under 2 years old    Anyone with a health, developmental, or mental health condition that can be made worse by wearing a mask    Anyone who is unconscious or unable to remove the face covering without help. See the CDC's guidance on who should not wear a face mask.  When to call your care team  Call your care team right away if a sick person has any of these:    Trouble breathing    Pain or pressure in  chest  If a sick person has any of these, call 911:    Trouble breathing that gets worse    Pain or pressure in chest that gets worse    Blue tint to lips or face    Fast or irregular heartbeat    Confusion or trouble waking    Fainting or loss of consciousness    Coughing up blood  Going home from the hospital   If you have COVID-19 and were recently in the hospital:    Follow the instructions above for self-care and isolation.    Follow the hospital care team s instructions.    Ask questions if anything is unclear to you. Write down answers so you remember them.  Date last modified: 8/12/2020  [employer] last reviewed this educational content on 4/1/2020 2000-2020 [employer]. [address] [place] [address]. All rights reserved. This information is not intended as a substitute for professional medical care. Always follow your healthcare professional's instructions.    Phone call duration:  4.17 minutes              ZENOBIA Garrison

## 2020-10-15 DIAGNOSIS — E78.5 HYPERLIPIDEMIA LDL GOAL <130: ICD-10-CM

## 2020-10-15 RX ORDER — ATORVASTATIN CALCIUM 20 MG/1
TABLET, FILM COATED ORAL
Qty: 90 TABLET | Refills: 1 | Status: SHIPPED | OUTPATIENT
Start: 2020-10-15 | End: 2021-04-14

## 2020-10-15 NOTE — TELEPHONE ENCOUNTER
Prescription approved per [facility].    Merrick Hutchinson RN  October 15, 2020

## 2020-10-21 ENCOUNTER — TELEPHONE (OUTPATIENT)
Dept: OTHER | Facility: CLINIC | Age: 70
End: 2020-10-21

## 2020-10-21 ENCOUNTER — VIRTUAL VISIT (OUTPATIENT)
Dept: OTHER | Facility: CLINIC | Age: 70
End: 2020-10-21
Attending: SURGERY
Payer: MEDICARE

## 2020-10-21 DIAGNOSIS — Z09 SURGICAL FOLLOW-UP CARE: Primary | ICD-10-CM

## 2020-10-21 PROCEDURE — 99024 POSTOP FOLLOW-UP VISIT: CPT | Performed by: SURGERY

## 2020-10-21 NOTE — TELEPHONE ENCOUNTER
Discussed the below with Dr. Jasmine.  Patient visit switched to telephone visit- I called Michaela and discussed she will get a call 30 minutes prior to be checked in.     Janet WHITNEY, RN    [facility]  [facility]  Office: [phone]  Fax: [phone]

## 2020-10-21 NOTE — TELEPHONE ENCOUNTER
[facility]    Who is the name of the provider?:  Ken[place]      What is the location you see this provider at?: [place]    Reason for call:  Surgery 10/8.  Has 11:30 post op appt w Dr. Jasmine today, 10/21.  Has COVID symptoms, loss of taste & smell, fatigue.  Tested negative 10/15.  Should she come in to [facility] or do virtual/phone appt?    Can we leave a detailed message on this number?  YES

## 2020-10-21 NOTE — PROGRESS NOTES
"Michaela Dorman is a 70 year old female who is being evaluated via a billable telephone visit.      The patient has been notified of following:     \"This telephone visit will be conducted via a call between you and your physician/provider. We have found that certain health care needs can be provided without the need for a physical exam.  This service lets us provide the care you need with a short phone conversation.  If a prescription is necessary we can send it directly to your pharmacy.  If lab work is needed we can place an order for that and you can then stop by our lab to have the test done at a later time.    Telephone visits are billed at different rates depending on your insurance coverage. During this emergency period, for some insurers they may be billed the same as an in-person visit.  Please reach out to your insurance provider with any questions.    If during the course of the call the physician/provider feels a telephone visit is not appropriate, you will not be charged for this service.\"    Patient has given verbal consent for Telephone visit?  Yes     What phone number would you like to be contacted at? Mobile phone number [phone]    How would you like to obtain your AVS? My chart   "

## 2020-10-21 NOTE — PROGRESS NOTES
Michaela Dorman is POD #13 from a left carotid endarterectomy performed for an asymptomatic 80% stenosis.  Recently she had upper respiratory symptoms and tested negative for COVID-19.  Nevertheless, she is self quarantining and I am conducting a telephone interview with her today.  Apart from her cold symptoms she is doing very well.  She denies any swallowing difficulty or any neurologic focality.  She states that her neck incision is healing beautifully.  She is resuming her normal activities.    ASSESSMENT:  POD #13 left carotid endarterectomy clinically doing well.    RECOMMENDATION:  She will continue her medical regimen including daily aspirin.  Vascular surgical follow-up will be with me in 3 months for a left carotid ultrasound.    Total length of this telephone conversation was 5 minutes.    Cale Jasmine MD

## 2020-10-27 DIAGNOSIS — I65.22 SYMPTOMATIC STENOSIS OF LEFT CAROTID ARTERY: Primary | ICD-10-CM

## 2020-11-02 DIAGNOSIS — R30.0 DYSURIA: ICD-10-CM

## 2020-11-02 DIAGNOSIS — I10 ESSENTIAL HYPERTENSION: ICD-10-CM

## 2020-11-02 RX ORDER — TAMSULOSIN HYDROCHLORIDE 0.4 MG/1
CAPSULE ORAL
Qty: 90 CAPSULE | Refills: 1 | Status: SHIPPED | OUTPATIENT
Start: 2020-11-02 | End: 2021-01-05

## 2020-11-02 RX ORDER — HYDROCHLOROTHIAZIDE 25 MG/1
TABLET ORAL
Qty: 90 TABLET | Refills: 1 | Status: SHIPPED | OUTPATIENT
Start: 2020-11-02 | End: 2021-01-05

## 2020-11-10 ENCOUNTER — MYC MEDICAL ADVICE (OUTPATIENT)
Dept: FAMILY MEDICINE | Facility: OTHER | Age: 70
End: 2020-11-10

## 2020-11-10 ENCOUNTER — VIRTUAL VISIT (OUTPATIENT)
Dept: FAMILY MEDICINE | Facility: OTHER | Age: 70
End: 2020-11-10
Payer: COMMERCIAL

## 2020-11-10 DIAGNOSIS — N30.01 ACUTE CYSTITIS WITH HEMATURIA: Primary | ICD-10-CM

## 2020-11-10 DIAGNOSIS — R30.0 BURNING WITH URINATION: Primary | ICD-10-CM

## 2020-11-10 DIAGNOSIS — R30.0 BURNING WITH URINATION: ICD-10-CM

## 2020-11-10 LAB
ALBUMIN UR-MCNC: 10 MG/DL
APPEARANCE UR: ABNORMAL
BACTERIA #/AREA URNS HPF: ABNORMAL /HPF
BILIRUB UR QL STRIP: ABNORMAL
COLOR UR AUTO: YELLOW
GLUCOSE UR STRIP-MCNC: NEGATIVE MG/DL
HGB UR QL STRIP: ABNORMAL
KETONES UR STRIP-MCNC: 5 MG/DL
LEUKOCYTE ESTERASE UR QL STRIP: ABNORMAL
MUCOUS THREADS #/AREA URNS LPF: PRESENT /LPF
NITRATE UR QL: NEGATIVE
PH UR STRIP: 5 PH (ref 5–7)
RBC #/AREA URNS AUTO: 5 /HPF (ref 0–2)
SOURCE: ABNORMAL
SP GR UR STRIP: 1.02 (ref 1–1.03)
SQUAMOUS #/AREA URNS AUTO: 10 /HPF (ref 0–1)
UROBILINOGEN UR STRIP-MCNC: 0.2 MG/DL (ref 0–2)
WBC #/AREA URNS AUTO: >100 /HPF (ref 0–5)

## 2020-11-10 PROCEDURE — 81001 URINALYSIS AUTO W/SCOPE: CPT | Performed by: PHYSICIAN ASSISTANT

## 2020-11-10 PROCEDURE — 87086 URINE CULTURE/COLONY COUNT: CPT | Performed by: PHYSICIAN ASSISTANT

## 2020-11-10 PROCEDURE — 99213 OFFICE O/P EST LOW 20 MIN: CPT | Mod: 95 | Performed by: PHYSICIAN ASSISTANT

## 2020-11-10 RX ORDER — CIPROFLOXACIN 500 MG/1
500 TABLET, FILM COATED ORAL 2 TIMES DAILY
Qty: 20 TABLET | Refills: 0 | Status: SHIPPED | OUTPATIENT
Start: 2020-11-10 | End: 2020-11-20

## 2020-11-10 NOTE — PROGRESS NOTES
"Michaela Dorman is a 70 year old female who is being evaluated via a billable telephone visit.      The patient has been notified of following:     \"This telephone visit will be conducted via a call between you and your physician/provider. We have found that certain health care needs can be provided without the need for a physical exam.  This service lets us provide the care you need with a short phone conversation.  If a prescription is necessary we can send it directly to your pharmacy.  If lab work is needed we can place an order for that and you can then stop by our lab to have the test done at a later time.    Telephone visits are billed at different rates depending on your insurance coverage. During this emergency period, for some insurers they may be billed the same as an in-person visit.  Please reach out to your insurance provider with any questions.    If during the course of the call the physician/provider feels a telephone visit is not appropriate, you will not be charged for this service.\"    Patient has given verbal consent for Telephone visit?  Yes    What phone number would you like to be contacted at? [phone]    How would you like to obtain your AVS? Ubaldo Alas     Michaela Dorman is a 70 year old female who presents via phone visit today for the following health issues:    HPI     Genitourinary - Female  Onset/Duration: 2 days   Description:   Painful urination (Dysuria): YES           Frequency: YES  Blood in urine (Hematuria): no  Delay in urine (Hesitency): no  Intensity: mild  Progression of Symptoms:  same  Accompanying Signs & Symptoms:  Fever/chills: no  Flank pain: no  Nausea and vomiting: no  Vaginal symptoms: none  Abdominal/Pelvic Pain: no  History:   History of frequent UTI s: no  History of kidney stones: no  Sexually Active: no  Possibility of pregnancy: No  Precipitating or alleviating factors: None  Therapies tried and outcome: Pyridium          Review of Systems "   Constitutional, HEENT, cardiovascular, pulmonary, GI, , musculoskeletal, neuro, skin, endocrine and psych systems are negative, except as otherwise noted.       Objective          Vitals:  No vitals were obtained today due to virtual visit.    healthy, alert and no distress  PSYCH: Alert and oriented times 3; coherent speech, normal   rate and volume, able to articulate logical thoughts, able   to abstract reason, no tangential thoughts, no hallucinations   or delusions  Her affect is normal  RESP: No cough, no audible wheezing, able to talk in full sentences  Remainder of exam unable to be completed due to telephone visits    Results for orders placed or performed in visit on 11/10/20 (from the past 24 hour(s))   UA reflex to Microscopic and Culture    Specimen: Midstream Urine   Result Value Ref Range    Color Urine Yellow     Appearance Urine Slightly Cloudy     Glucose Urine Negative NEG^Negative mg/dL    Bilirubin Urine Small (A) NEG^Negative    Ketones Urine 5 (A) NEG^Negative mg/dL    Specific Gravity Urine 1.020 1.003 - 1.035    Blood Urine Trace (A) NEG^Negative    pH Urine 5.0 5.0 - 7.0 pH    Protein Albumin Urine 10 (A) NEG^Negative mg/dL    Urobilinogen mg/dL 0.2 0.0 - 2.0 mg/dL    Nitrite Urine Negative NEG^Negative    Leukocyte Esterase Urine Small (A) NEG^Negative    Source Midstream Urine    Urine Microscopic   Result Value Ref Range    WBC Urine >100 (H) 0 - 5 /HPF    RBC Urine 5 (H) 0 - 2 /HPF    Bacteria Urine Moderate (A) NEG^Negative /HPF    Squamous Epithelial /HPF Urine 10 (H) 0 - 1 /HPF    Mucous Urine Present (A) NEG^Negative /LPF     *Note: Due to a large number of results and/or encounters for the requested time period, some results have not been displayed. A complete set of results can be found in Results Review.           Assessment/Plan:    Assessment & Plan     Michaela was seen today for uti.    Diagnoses and all orders for this visit:    Acute cystitis with hematuria  -      UROLOGY ADULT REFERRAL; Future  -     ciprofloxacin (CIPRO) 500 MG tablet; Take 1 tablet (500 mg) by mouth 2 times daily for 10 days            Follow-up with urology as advised since she has had multiple urinary tract infections within a short period of time.    Return in about 2 weeks (around 11/24/2020) for recheck of current condition, if symptoms do not improve.6    Phani Jason PA-C  [facility]    Phone call duration:  6 minutes

## 2020-11-10 NOTE — TELEPHONE ENCOUNTER
Responded via [employer].  MARY GRACE MatsonN, RN, PHN            Should have virtual visit

## 2020-11-10 NOTE — PROGRESS NOTES
"Michaela Domran is a 70 year old female who is being evaluated via a billable telephone visit.      The patient has been notified of following:     \"This telephone visit will be conducted via a call between you and your physician/provider. We have found that certain health care needs can be provided without the need for a physical exam.  This service lets us provide the care you need with a short phone conversation.  If a prescription is necessary we can send it directly to your pharmacy.  If lab work is needed we can place an order for that and you can then stop by our lab to have the test done at a later time.    Telephone visits are billed at different rates depending on your insurance coverage. During this emergency period, for some insurers they may be billed the same as an in-person visit.  Please reach out to your insurance provider with any questions.    If during the course of the call the physician/provider feels a telephone visit is not appropriate, you will not be charged for this service.\"    Patient has given verbal consent for Telephone visit?  {YES-NO  Default Yes:4444::\"Yes\"}    What phone number would you like to be contacted at? ***    How would you like to obtain your AVS? {AVS Preference:405705}    Subjective     Michaela Dorman is a 70 year old female who presents via phone visit today for the following health issues:    HPI     {SUPERLIST (Optional):353113}  {PEDS Chronic and Acute Problems (Optional):988390}     {additonal problems for provider to add (Optional):818908}    Review of Systems   {ROS COMP (Optional):635417}       Objective          Vitals:  No vitals were obtained today due to virtual visit.    {GENERAL APPEARANCE:50::\"healthy\",\"alert\",\"no distress\"}  PSYCH: Alert and oriented times 3; coherent speech, normal   rate and volume, able to articulate logical thoughts, able   to abstract reason, no tangential thoughts, no hallucinations   or delusions  Her affect is { " ":7963717::\"normal\"}  RESP: No cough, no audible wheezing, able to talk in full sentences  Remainder of exam unable to be completed due to telephone visits    {Diagnostic Test Results (Optional):313194}        Assessment/Plan:    {PROVIDER CHARTING PREFERENCE SOAPO:725010}    Phone call duration:  *** minutes              "

## 2020-11-10 NOTE — TELEPHONE ENCOUNTER
Called to discuss further with the patient.         Patient has been scheduled.   Order placed.   Merrick Hutchinson RN  November 10, 2020

## 2020-11-11 LAB
BACTERIA SPEC CULT: NORMAL
SPECIMEN SOURCE: NORMAL

## 2020-11-12 ENCOUNTER — TELEPHONE (OUTPATIENT)
Dept: UROLOGY | Facility: CLINIC | Age: 70
End: 2020-11-12

## 2020-11-12 NOTE — TELEPHONE ENCOUNTER
[facility] Call Center    Phone Message    May a detailed message be left on voicemail: yes     Reason for Call: Appointment Intake    Referring Provider Name: Phani Tapia PA-C in [facility]  Diagnosis and/or Symptoms: Acute cystitis with hematuria, frequent UTI    Referral/Records are in system for review, please advise and contact patient with appointment options! Msg being send due to hematuria.    Action Taken: Message routed to:  Adult Clinics: Urology p [id number]    Travel Screening: Not Applicable

## 2020-11-12 NOTE — TELEPHONE ENCOUNTER
Called patient and assisted in scheduling a new video visit with Geneva Zayas PA-C for 11/20. Patient had no further questions.    Geneva Campos LPN

## 2020-11-19 ENCOUNTER — MYC MEDICAL ADVICE (OUTPATIENT)
Dept: FAMILY MEDICINE | Facility: OTHER | Age: 70
End: 2020-11-19

## 2020-11-19 DIAGNOSIS — N30.01 ACUTE CYSTITIS WITH HEMATURIA: Primary | ICD-10-CM

## 2020-11-19 DIAGNOSIS — Z51.11 ENCOUNTER FOR ANTINEOPLASTIC CHEMOTHERAPY: ICD-10-CM

## 2020-11-19 RX ORDER — PROCHLORPERAZINE MALEATE 10 MG
TABLET ORAL
Qty: 30 TABLET | Refills: 0 | Status: SHIPPED | OUTPATIENT
Start: 2020-11-19 | End: 2020-12-18

## 2020-11-20 DIAGNOSIS — N30.01 ACUTE CYSTITIS WITH HEMATURIA: ICD-10-CM

## 2020-11-20 DIAGNOSIS — N30.01 ACUTE CYSTITIS WITH HEMATURIA: Primary | ICD-10-CM

## 2020-11-20 LAB
ALBUMIN UR-MCNC: NEGATIVE MG/DL
APPEARANCE UR: CLEAR
BACTERIA #/AREA URNS HPF: ABNORMAL /HPF
BASOPHILS # BLD AUTO: 0.1 10E9/L (ref 0–0.2)
BASOPHILS NFR BLD AUTO: 0.7 %
BILIRUB UR QL STRIP: NEGATIVE
COLOR UR AUTO: ABNORMAL
DIFFERENTIAL METHOD BLD: ABNORMAL
EOSINOPHIL NFR BLD AUTO: 2.1 %
ERYTHROCYTE [DISTWIDTH] IN BLOOD BY AUTOMATED COUNT: 15.7 % (ref 10–15)
GLUCOSE UR STRIP-MCNC: NEGATIVE MG/DL
HCT VFR BLD AUTO: 34.4 % (ref 35–47)
HGB BLD-MCNC: 11 G/DL (ref 11.7–15.7)
HGB UR QL STRIP: NEGATIVE
IMM GRANULOCYTES # BLD: 0 10E9/L (ref 0–0.4)
IMM GRANULOCYTES NFR BLD: 0.5 %
KETONES UR STRIP-MCNC: NEGATIVE MG/DL
LEUKOCYTE ESTERASE UR QL STRIP: NEGATIVE
LYMPHOCYTES # BLD AUTO: 1.7 10E9/L (ref 0.8–5.3)
LYMPHOCYTES NFR BLD AUTO: 19.4 %
MCH RBC QN AUTO: 33 PG (ref 26.5–33)
MCHC RBC AUTO-ENTMCNC: 32 G/DL (ref 31.5–36.5)
MCV RBC AUTO: 103 FL (ref 78–100)
MONOCYTES # BLD AUTO: 0.7 10E9/L (ref 0–1.3)
MONOCYTES NFR BLD AUTO: 7.9 %
NEUTROPHILS # BLD AUTO: 6 10E9/L (ref 1.6–8.3)
NEUTROPHILS NFR BLD AUTO: 69.4 %
NITRATE UR QL: POSITIVE
NRBC # BLD AUTO: 0 10*3/UL
NRBC BLD AUTO-RTO: 0 /100
PH UR STRIP: 7 PH (ref 5–7)
PLATELET # BLD AUTO: 254 10E9/L (ref 150–450)
RBC # BLD AUTO: 3.33 10E12/L (ref 3.8–5.2)
RBC #/AREA URNS AUTO: 0 /HPF (ref 0–2)
SOURCE: ABNORMAL
SP GR UR STRIP: 1.01 (ref 1–1.03)
UROBILINOGEN UR STRIP-MCNC: 4 MG/DL (ref 0–2)
WBC # BLD AUTO: 8.6 10E9/L (ref 4–11)
WBC #/AREA URNS AUTO: 9 /HPF (ref 0–5)

## 2020-11-20 PROCEDURE — 81001 URINALYSIS AUTO W/SCOPE: CPT | Performed by: PHYSICIAN ASSISTANT

## 2020-11-20 PROCEDURE — 85025 COMPLETE CBC W/AUTO DIFF WBC: CPT | Performed by: PHYSICIAN ASSISTANT

## 2020-11-20 PROCEDURE — 36415 COLL VENOUS BLD VENIPUNCTURE: CPT | Performed by: PHYSICIAN ASSISTANT

## 2020-11-20 PROCEDURE — 87086 URINE CULTURE/COLONY COUNT: CPT | Performed by: PHYSICIAN ASSISTANT

## 2020-11-21 LAB
BACTERIA SPEC CULT: NO GROWTH
Lab: NORMAL
SPECIMEN SOURCE: NORMAL

## 2020-11-23 ENCOUNTER — VIRTUAL VISIT (OUTPATIENT)
Dept: UROLOGY | Facility: CLINIC | Age: 70
End: 2020-11-23
Payer: COMMERCIAL

## 2020-11-23 DIAGNOSIS — N20.0 NEPHROLITHIASIS: Primary | ICD-10-CM

## 2020-11-23 DIAGNOSIS — N39.0 RECURRENT UTI: ICD-10-CM

## 2020-11-23 DIAGNOSIS — N95.2 ATROPHIC VAGINITIS: ICD-10-CM

## 2020-11-23 PROCEDURE — 99203 OFFICE O/P NEW LOW 30 MIN: CPT | Mod: 95 | Performed by: UROLOGY

## 2020-11-23 RX ORDER — SULFAMETHOXAZOLE AND TRIMETHOPRIM 400; 80 MG/1; MG/1
1 TABLET ORAL AT BEDTIME
Qty: 30 TABLET | Refills: 11 | Status: SHIPPED | OUTPATIENT
Start: 2020-11-23 | End: 2021-01-05

## 2020-11-23 NOTE — PATIENT INSTRUCTIONS
-CT scan abd/pelvis  -start bactrim SS for prophylaxis  -stay well hydrated  -start estrogen cream, needs a new Rx  -could discontinue flomax for now since she doesn't feel like she has a problem with emptying  -f/u with me in 3 months to assess symptoms and undergo cystoscopy

## 2020-11-23 NOTE — PROGRESS NOTES
"Michaela Dorman is a 70 year old female who is being evaluated via a billable video visit.      The patient has been notified of following:     \"This video visit will be conducted via a call between you and your physician/provider. We have found that certain health care needs can be provided without the need for an in-person physical exam.  This service lets us provide the care you need with a video conversation.  If a prescription is necessary we can send it directly to your pharmacy.  If lab work is needed we can place an order for that and you can then stop by our lab to have the test done at a later time.    Video visits are billed at different rates depending on your insurance coverage.  Please reach out to your insurance provider with any questions.    If during the course of the call the physician/provider feels a video visit is not appropriate, you will not be charged for this service.\"    Patient has given verbal consent for Video visit? Yes  How would you like to obtain your AVS? MyChart  If you are dropped from the video visit, the video invite should be resent to: Text to cell phone: [phone]  Will anyone else be joining your video visit? No        Video-Visit Details    Type of service:  Video Visit    Video Start Time: 11:13 AM  Video End Time: 11:32 AM    Originating Location (pt. Location): Home    Distant Location (provider location):  [facility]     Platform used for Video Visit: Vee Shaw MD      "

## 2020-11-23 NOTE — PROGRESS NOTES
CC:  Recurrent uti's    HPI:  Michaela Dorman is a 70 year old female asked to be seen in consultation by Phani Tapia PA-C for the above.  This problem has been going on for several years and has been getting worse.  She finds that when she goes camping she doesn't drink that often and almost always will get an infection.  It is not associated with any incontinence unless she does not take Azo pills when she has a uti, ow can have some.  Her symptoms are cramping in the suprapubic area and urinary urgency as well as some dysuria.  The patient voids q2 hours, nocturia X 1.  She drinks a lot of water, and she does drink some soda.  She currently denies any dysuria,  hematuria, hesitancy, intermittency, feeling of incomplete emptying, or any recent hx of stones.    The patient has no constipation currently while on miralax and metamucil.  She is sexually active and denies any dyspareunia or pelvic pain.   She denies any vaginal bulge.  She has no neurological or balance problems.     Obstetric Hx:  She is .  The weight of her largest baby was 6 lbs 8oz.  Babies were delivered vaginally.  Menopause around age 40, HRT:  None  She has a hx of outer quadrant breast cancer but ER/AK receptors  negative    Past Medical History:   Diagnosis Date     Carotid stenosis, bilateral L80%, R40% 2020     Central stenosis of spinal canal 2017    lumbar      DDD (degenerative disc disease), lumbar 2017     Depressive disorder, not elsewhere classified      Esophageal reflux      ETOH abuse     in remission     Foraminal stenosis of lumbar region 2017    Complete lumbar spine left at various degrees and to a lesser extent the right as well.     Lumbar radiculopathy 2017     Prophylactic antibiotic for dental procedure indicated due to prior joint replacement 2017     S/P reverse total shoulder arthroplasty, right 2017     Subdural hematoma (H)        Past Surgical History:   Procedure Laterality  Date     ARTHROPLASTY SHOULDER Right 11/17/2015     ARTHROSCOPY KNEE  6/7/2012    Procedure:ARTHROSCOPY KNEE; right knee arthroscopy with partial lateral menisectomy; Surgeon:DAMIÁN MCALLISTER; Location:[facility]     C LIGATE FALLOPIAN TUBE       COLONOSCOPY N/A 12/22/2017    Procedure: COLONOSCOPY;  colonoscopy;  Surgeon: Chuck Chand MD;  Location: [facility]     ENDARTERECTOMY CAROTID Left 10/8/2020    Procedure: LEFT CAROTID ENDARTERECTOMY WITH EEG;  Surgeon: Lacho Jasmine MD;  Location: [facility]     EXCISE MASS AXILLA Left 9/16/2016    Procedure: EXCISE MASS AXILLA;  Surgeon: Keyon Blanco MD;  Location: [facility]     HC REMV CATARACT EXTRACAP,INSERT LENS, W/O ECP  6/25/2009    Right eye     INJECT EPIDURAL LUMBAR N/A 4/11/2019    Procedure: interlaminar epidual steroid injection lumbar 4-5;  Surgeon: Oskar Salvador MD;  [facility]     INJECT EPIDURAL LUMBAR Bilateral 9/12/2019    Procedure: lumbar 4-5 epidural interlaminar steroid injection;  Surgeon: Oskar Salvador MD;  [facility]     INSERT PORT VASCULAR ACCESS Right 10/7/2016    Procedure: INSERT PORT VASCULAR ACCESS;  Surgeon: Keyon Blanco MD;  [facility]     MASTECTOMY SIMPLE BILATERAL Bilateral 2/8/2017    Procedure: MASTECTOMY SIMPLE BILATERAL;  Surgeon: Keyon Blanco MD;  [facility]     OPEN REDUCTION INTERNAL FIXATION FOOT Right 3/20/2015    Procedure: OPEN REDUCTION INTERNAL FIXATION FOOT;  Surgeon: Javi Herrmann DPM;  Location[facility]     OPTICAL TRACKING SYSTEM FUSION SPINE POSTERIOR LUMBAR TWO LEVELS N/A 2/4/2020    Procedure: LUMBAR 2-SACRAL1 TRANSFORMINAL INTERBODY FUSION;  Surgeon: Lenny Gomes MD;  Location[facility]     REMOVE PORT VASCULAR ACCESS Right 2/14/2018    Procedure: REMOVE PORT VASCULAR ACCESS;  right intra jugular port removal;  Surgeon: Keyon Blanco MD;  [facility]     SHOULDER SURGERY Right 11/2015     Z[facility] NONSPECIFIC PROCEDURE  1956    ankle fracture surgery       Meds, Allergies,  FHx and SHx reviewed per nurse's intake note.    ROS is negative on a 14 point scale, healing from carotid artery surgery.  All other positive and pertinent information is mentioned in the HPI.    PEx:   not currently breastfeeding.  Data Unavailable, There is no height or weight on file to calculate BMI., 0 lbs 0 oz  Gen appearance:  Well groomed  HEENT:  EOMI, AT NC  Psych:  Normal Affect  Neuro:  A/O X 3  Skin:  Well perfused  Resp:  No increased respiratory effort  Musk:  Full ROM in extremities  :  Ndeferred    UA: UA RESULTS:  Recent Labs   Lab Test 11/20/20  1200 10/01/20  1145 10/01/20  1145   COLOR Natasha   < > Yellow   APPEARANCE Clear   < > Clear   URINEGLC Negative   < > Negative   URINEBILI Negative   < > Negative   URINEKETONE Negative   < > Negative   SG 1.009   < > 1.015   UBLD Negative   < > Trace*   URINEPH 7.0   < > 6.5   PROTEIN Negative   < > Negative   UROBILINOGEN  --   --  0.2   NITRITE Positive*   < > Positive*   LEUKEST Negative   < > Trace*   RBCU 0   < > 2-5*   WBCU 9*   < > 10-25*    < > = values in this interval not displayed.         ASSESSMENT and PLAN:  This is a 70 year old female with recurrent uti's although cultures are negative symptoms resolve with antibiotics.  Different management options were discussed with the patient including observation, prophylaxis, and estrogen cream.  -CT scan abd/pelvis  -start bactrim SS for prophylaxis  -stay well hydrated  -start estrogen cream, needs a new Rx  -could discontinue flomax for now since she doesn't feel like she has a problem with emptying  -f/u with me in 3 months to assess symptoms and undergo cystoscopy      Thank you for allowing me to participate in Ms. Dorman's care.  I will keep you updated on her progress.    Facundo Shaw MD

## 2020-11-30 ENCOUNTER — MYC MEDICAL ADVICE (OUTPATIENT)
Dept: FAMILY MEDICINE | Facility: OTHER | Age: 70
End: 2020-11-30

## 2020-11-30 DIAGNOSIS — N39.0 CHRONIC UTI: Primary | ICD-10-CM

## 2020-12-01 DIAGNOSIS — N39.0 CHRONIC UTI: ICD-10-CM

## 2020-12-01 LAB
ALBUMIN UR-MCNC: NEGATIVE MG/DL
APPEARANCE UR: ABNORMAL
BILIRUB UR QL STRIP: NEGATIVE
COLOR UR AUTO: ABNORMAL
GLUCOSE UR STRIP-MCNC: NEGATIVE MG/DL
HGB UR QL STRIP: NEGATIVE
KETONES UR STRIP-MCNC: NEGATIVE MG/DL
LEUKOCYTE ESTERASE UR QL STRIP: NEGATIVE
MUCOUS THREADS #/AREA URNS LPF: PRESENT /LPF
NITRATE UR QL: NEGATIVE
PH UR STRIP: 7 PH (ref 5–7)
RBC #/AREA URNS AUTO: 6 /HPF (ref 0–2)
SOURCE: ABNORMAL
SP GR UR STRIP: 1.02 (ref 1–1.03)
SQUAMOUS #/AREA URNS AUTO: <1 /HPF (ref 0–1)
TRANS CELLS #/AREA URNS HPF: 2 /HPF
UROBILINOGEN UR STRIP-MCNC: 0 MG/DL (ref 0–2)
WBC #/AREA URNS AUTO: 11 /HPF (ref 0–5)

## 2020-12-01 PROCEDURE — 81001 URINALYSIS AUTO W/SCOPE: CPT | Performed by: PHYSICIAN ASSISTANT

## 2020-12-07 ENCOUNTER — MYC MEDICAL ADVICE (OUTPATIENT)
Dept: FAMILY MEDICINE | Facility: OTHER | Age: 70
End: 2020-12-07

## 2020-12-07 DIAGNOSIS — N30.00 ACUTE CYSTITIS WITHOUT HEMATURIA: Primary | ICD-10-CM

## 2020-12-07 RX ORDER — CIPROFLOXACIN 500 MG/1
500 TABLET, FILM COATED ORAL 2 TIMES DAILY
Qty: 20 TABLET | Refills: 0 | Status: SHIPPED | OUTPATIENT
Start: 2020-12-07 | End: 2020-12-15

## 2020-12-09 ENCOUNTER — MYC MEDICAL ADVICE (OUTPATIENT)
Dept: FAMILY MEDICINE | Facility: OTHER | Age: 70
End: 2020-12-09

## 2020-12-10 ENCOUNTER — MYC MEDICAL ADVICE (OUTPATIENT)
Dept: FAMILY MEDICINE | Facility: OTHER | Age: 70
End: 2020-12-10

## 2020-12-11 ENCOUNTER — MYC MEDICAL ADVICE (OUTPATIENT)
Dept: FAMILY MEDICINE | Facility: OTHER | Age: 70
End: 2020-12-11

## 2020-12-11 DIAGNOSIS — N30.01 ACUTE CYSTITIS WITH HEMATURIA: Primary | ICD-10-CM

## 2020-12-11 LAB
ALBUMIN SERPL-MCNC: 4.2 G/DL (ref 3.4–5)
ALBUMIN UR-MCNC: 100 MG/DL
ALP SERPL-CCNC: 47 U/L (ref 40–150)
ALT SERPL W P-5'-P-CCNC: 28 U/L (ref 0–50)
ANION GAP SERPL CALCULATED.3IONS-SCNC: 5 MMOL/L (ref 3–14)
APPEARANCE UR: CLEAR
AST SERPL W P-5'-P-CCNC: 30 U/L (ref 0–45)
BASOPHILS # BLD AUTO: 0.1 10E9/L (ref 0–0.2)
BASOPHILS NFR BLD AUTO: 0.6 %
BILIRUB SERPL-MCNC: 0.5 MG/DL (ref 0.2–1.3)
BILIRUB UR QL STRIP: NEGATIVE
BUN SERPL-MCNC: 26 MG/DL (ref 7–30)
CALCIUM SERPL-MCNC: 9.7 MG/DL (ref 8.5–10.1)
CHLORIDE SERPL-SCNC: 108 MMOL/L (ref 94–109)
CO2 SERPL-SCNC: 26 MMOL/L (ref 20–32)
COLOR UR AUTO: ABNORMAL
CREAT SERPL-MCNC: 1.19 MG/DL (ref 0.52–1.04)
DIFFERENTIAL METHOD BLD: ABNORMAL
EOSINOPHIL NFR BLD AUTO: 1.1 %
ERYTHROCYTE [DISTWIDTH] IN BLOOD BY AUTOMATED COUNT: 14.3 % (ref 10–15)
GFR SERPL CREATININE-BSD FRML MDRD: 46 ML/MIN/{1.73_M2}
GLUCOSE SERPL-MCNC: 95 MG/DL (ref 70–99)
GLUCOSE UR STRIP-MCNC: NEGATIVE MG/DL
HCT VFR BLD AUTO: 34.8 % (ref 35–47)
HGB BLD-MCNC: 11 G/DL (ref 11.7–15.7)
HGB UR QL STRIP: NEGATIVE
HYALINE CASTS #/AREA URNS LPF: 10 /LPF (ref 0–2)
IMM GRANULOCYTES # BLD: 0 10E9/L (ref 0–0.4)
IMM GRANULOCYTES NFR BLD: 0.4 %
KETONES UR STRIP-MCNC: NEGATIVE MG/DL
LEUKOCYTE ESTERASE UR QL STRIP: NEGATIVE
LYMPHOCYTES # BLD AUTO: 1.4 10E9/L (ref 0.8–5.3)
LYMPHOCYTES NFR BLD AUTO: 17.1 %
MCH RBC QN AUTO: 33.3 PG (ref 26.5–33)
MCHC RBC AUTO-ENTMCNC: 31.6 G/DL (ref 31.5–36.5)
MCV RBC AUTO: 106 FL (ref 78–100)
MONOCYTES # BLD AUTO: 0.5 10E9/L (ref 0–1.3)
MONOCYTES NFR BLD AUTO: 6.3 %
MUCOUS THREADS #/AREA URNS LPF: PRESENT /LPF
NEUTROPHILS # BLD AUTO: 6.3 10E9/L (ref 1.6–8.3)
NEUTROPHILS NFR BLD AUTO: 74.5 %
NITRATE UR QL: POSITIVE
NRBC # BLD AUTO: 0 10*3/UL
NRBC BLD AUTO-RTO: 0 /100
PH UR STRIP: 5 PH (ref 5–7)
PLATELET # BLD AUTO: 236 10E9/L (ref 150–450)
POTASSIUM SERPL-SCNC: 4.6 MMOL/L (ref 3.4–5.3)
PROT SERPL-MCNC: 7.8 G/DL (ref 6.8–8.8)
RBC # BLD AUTO: 3.3 10E12/L (ref 3.8–5.2)
RBC #/AREA URNS AUTO: 6 /HPF (ref 0–2)
SODIUM SERPL-SCNC: 139 MMOL/L (ref 133–144)
SOURCE: ABNORMAL
SP GR UR STRIP: 1.02 (ref 1–1.03)
SPECIMEN SOURCE: NORMAL
SQUAMOUS #/AREA URNS AUTO: 4 /HPF (ref 0–1)
TRANS CELLS #/AREA URNS HPF: 4 /HPF
UROBILINOGEN UR STRIP-MCNC: 4 MG/DL (ref 0–2)
WBC # BLD AUTO: 8.4 10E9/L (ref 4–11)
WBC #/AREA URNS AUTO: 36 /HPF (ref 0–5)
WET PREP SPEC: NORMAL

## 2020-12-11 PROCEDURE — 87086 URINE CULTURE/COLONY COUNT: CPT | Performed by: PHYSICIAN ASSISTANT

## 2020-12-11 PROCEDURE — 87210 SMEAR WET MOUNT SALINE/INK: CPT | Performed by: PHYSICIAN ASSISTANT

## 2020-12-11 PROCEDURE — 85025 COMPLETE CBC W/AUTO DIFF WBC: CPT | Performed by: PHYSICIAN ASSISTANT

## 2020-12-11 PROCEDURE — 81001 URINALYSIS AUTO W/SCOPE: CPT | Performed by: PHYSICIAN ASSISTANT

## 2020-12-11 PROCEDURE — 80053 COMPREHEN METABOLIC PANEL: CPT | Performed by: PHYSICIAN ASSISTANT

## 2020-12-11 PROCEDURE — 36415 COLL VENOUS BLD VENIPUNCTURE: CPT | Performed by: PHYSICIAN ASSISTANT

## 2020-12-11 RX ORDER — NITROFURANTOIN 25; 75 MG/1; MG/1
100 CAPSULE ORAL 2 TIMES DAILY
Qty: 20 CAPSULE | Refills: 0 | Status: SHIPPED | OUTPATIENT
Start: 2020-12-11 | End: 2020-12-24

## 2020-12-12 ENCOUNTER — MYC MEDICAL ADVICE (OUTPATIENT)
Dept: FAMILY MEDICINE | Facility: OTHER | Age: 70
End: 2020-12-12

## 2020-12-12 LAB
BACTERIA SPEC CULT: NORMAL
Lab: NORMAL
SPECIMEN SOURCE: NORMAL

## 2020-12-14 NOTE — PROGRESS NOTES
Hematology/Oncology Video Visit  December 15, 2020  Oncology care team: Dr Ferguson/Juan José Hernandez RN    Due to the concerns around COVID-19 and adhering to social distancing we conducted this visit via video visit.     Reason for visit: 6 month follow-up breast cancer    Oncology history/HPI: Michaela Dorman is a 70 year old female with a history of left-sided invasive ductal breast cancer, ER/[place] negative, Her2 postive . She presented with a left axillary mass. She had neoadjuvant chemotherapy with 4 cycles of dose dense adriamycin/cytoxan followed by 4 cycles of Taxol/herceptin.  She had a bilateral mastectomy 2/8/2017. She had complete pathologic response with only a 7 mm area of DCIS. She then went on to complete one year of herceptin.     Interval history: Since she was seen last she had a workup for headaches and was found to have stenosis of her left carotid artery and is s/p a carotid endarterectomy in October. Headaches much better. She has also been referred to urology for recurrent UTI's and is scheduled for a CT ab/pelvis. Currently on her 3rd line of abx,     Started folic acid and iron in July and has felt like she has had more energy.     ROS: 12 pt ROS otherwise negative    Past Medical History:   Diagnosis Date     Carotid stenosis, bilateral L80%, R40% 9/2/2020     Central stenosis of spinal canal 12/1/2017    lumbar      DDD (degenerative disc disease), lumbar 12/1/2017     Depressive disorder, not elsewhere classified      Esophageal reflux      ETOH abuse     in remission     Foraminal stenosis of lumbar region 12/1/2017    Complete lumbar spine left at various degrees and to a lesser extent the right as well.     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     Subdural hematoma (H)          Current Outpatient Medications:      acetaminophen (TYLENOL)  500 MG tablet, Take 1,000 mg by mouth 3 times daily as needed for mild pain (500MG X 2 = 1,000MG), Disp: , Rfl:      ALPRAZolam (XANAX) 0.5 MG tablet, TAKE ONE TABLET BY MOUTH AT BEDTIME AS NEEDED FOR SLEEP, Disp: 30 tablet, Rfl: 0     amoxicillin-clavulanate (AUGMENTIN) 875-125 MG tablet, Take 1 tablet by mouth 2 times daily, Disp: 20 tablet, Rfl: 0     aspirin (ASA) 81 MG chewable tablet, Take 1 tablet (81 mg) by mouth daily, Disp: 100 tablet, Rfl: 3     atorvastatin (LIPITOR) 20 MG tablet, TAKE ONE TABLET BY MOUTH ONCE DAILY, Disp: 90 tablet, Rfl: 1     B-12 METHYLCOBALAMIN PO, Take 5,000 mcg by mouth every morning, Disp: , Rfl:      celecoxib (CELEBREX) 200 MG capsule, Take 1 capsule (200 mg) by mouth 2 times daily, Disp: 180 capsule, Rfl: 1     cholecalciferol (VITAMIN D3) 5000 units (125 mcg) capsule, Take 5,000 Units by mouth every morning , Disp: , Rfl:      ciprofloxacin (CIPRO) 500 MG tablet, Take 1 tablet (500 mg) by mouth 2 times daily, Disp: 20 tablet, Rfl: 0     COMPOUNDED NON-CONTROLLED SUBSTANCE (CMPD RX) - PHARMACY TO MIX COMPOUNDED MEDICATION, Estriol 0.1% cream (1mg/gram) in HRT base. Place 1 gram vaginally daily for 2 weeks, then vaginally twice weekly., Disp: 30 g, Rfl: 6     diphenhydrAMINE (BENADRYL) 25 MG tablet, Take 25 mg by mouth nightly as needed for itching or allergies, Disp: , Rfl:      escitalopram (LEXAPRO) 10 MG tablet, Take 10 mg by mouth every morning (in addition to a 20 mg dose to = 30 mg daily dose) , Disp: , Rfl:      escitalopram (LEXAPRO) 20 MG tablet, Take 20 mg by mouth every morning (in addition to a 10 mg dose to = 30 mg daily dose), Disp: , Rfl:      ferrous sulfate (FEROSUL) 325 (65 Fe) MG tablet, Take 325 mg by mouth every morning, Disp: , Rfl:      Garlic 1000 MG CAPS, Take 1,000 mg by mouth every morning, Disp: , Rfl:      hydrochlorothiazide (HYDRODIURIL) 25 MG tablet, TAKE ONE TABLET BY MOUTH ONCE DAILY, Disp: 90 tablet, Rfl: 1     Lansoprazole (PREVACID PO), Take  40 mg by mouth every morning , Disp: , Rfl:      Magnesium Oxide 500 MG TABS, Take 500 mg by mouth every morning, Disp: , Rfl:      nitroFURantoin macrocrystal-monohydrate (MACROBID) 100 MG capsule, Take 1 capsule (100 mg) by mouth 2 times daily, Disp: 20 capsule, Rfl: 0     polyethylene glycol (MIRALAX/GLYCOLAX) powder, Take 1 capful by mouth every morning , Disp: , Rfl:      prochlorperazine (COMPAZINE) 10 MG tablet, TAKE ONE TABLET BY MOUTH EVERY 6 HOURS AS NEEDED FOR NAUSEA / VOMITING, Disp: 30 tablet, Rfl: 0     Psyllium (METAMUCIL FIBER PO), Take 2 teaspoonful by mouth every morning (mix in with liquid and drink), Disp: , Rfl:      senna-docusate (SENOKOT-S/PERICOLACE) 8.6-50 MG tablet, Take 2 tablets by mouth 2 times daily (Patient taking differently: Take 2 tablets by mouth 2 times daily as needed for constipation ), Disp: 60 tablet, Rfl: 0     sulfamethoxazole-trimethoprim (BACTRIM) 400-80 MG tablet, Take 1 tablet by mouth At Bedtime, Disp: 30 tablet, Rfl: 11     tamsulosin (FLOMAX) 0.4 MG capsule, TAKE ONE CAPSULE BY MOUTH AT BEDTIME, Disp: 90 capsule, Rfl: 1     vitamin C (ASCORBIC ACID) 1000 MG TABS, Take 1,000 mg by mouth every morning, Disp: , Rfl:      Zinc 30 MG CAPS, Take 30 mg by mouth every morning , Disp: , Rfl:         Allergies   Allergen Reactions     No Known Drug Allergies         Video physical exam  General: Patient appears well in no acute distress. Thin body habitus.  Skin: No visualized rash or lesions on visualized skin  Eyes: EOMI, no erythema, sclera icterus or discharge noted  Resp: Appears to be breathing comfortably without accessory muscle usage, speaking in full sentences, no cough  MSK: Appears to have normal range of motion based on visualized movements  Neurologic: No apparent tremors, facial movements symmetric  Psych: affect good, alert and oriented    The rest of a comprehensive physical examination is deferred due to PHE (public health emergency) video  restrictions    Labs:  Results for NICHOLAS DONNELLY (MRN [id number]) as of 12/15/2020 12:03   Ref. Range 12/11/2020 10:34   Sodium Latest Ref Range: 133 - 144 mmol/L 139   Potassium Latest Ref Range: 3.4 - 5.3 mmol/L 4.6   Chloride Latest Ref Range: 94 - 109 mmol/L 108   Carbon Dioxide Latest Ref Range: 20 - 32 mmol/L 26   Urea Nitrogen Latest Ref Range: 7 - 30 mg/dL 26   Creatinine Latest Ref Range: 0.52 - 1.04 mg/dL 1.19 (H)   GFR Estimate Latest Ref Range: >60 mL/min/1.73_m2 46 (L)   GFR Estimate If Black Latest Ref Range: >60 mL/min/1.73_m2 53 (L)   Calcium Latest Ref Range: 8.5 - 10.1 mg/dL 9.7   Anion Gap Latest Ref Range: 3 - 14 mmol/L 5   Albumin Latest Ref Range: 3.4 - 5.0 g/dL 4.2   Protein Total Latest Ref Range: 6.8 - 8.8 g/dL 7.8   Bilirubin Total Latest Ref Range: 0.2 - 1.3 mg/dL 0.5   Alkaline Phosphatase Latest Ref Range: 40 - 150 U/L 47   ALT Latest Ref Range: 0 - 50 U/L 28   AST Latest Ref Range: 0 - 45 U/L 30   Glucose Latest Ref Range: 70 - 99 mg/dL 95   WBC Latest Ref Range: 4.0 - 11.0 10e9/L 8.4   Hemoglobin Latest Ref Range: 11.7 - 15.7 g/dL 11.0 (L)   Hematocrit Latest Ref Range: 35.0 - 47.0 % 34.8 (L)   Platelet Count Latest Ref Range: 150 - 450 10e9/L 236   RBC Count Latest Ref Range: 3.8 - 5.2 10e12/L 3.30 (L)   MCV Latest Ref Range: 78 - 100 fl 106 (H)   MCH Latest Ref Range: 26.5 - 33.0 pg 33.3 (H)   MCHC Latest Ref Range: 31.5 - 36.5 g/dL 31.6   RDW Latest Ref Range: 10.0 - 15.0 % 14.3   Diff Method Unknown Automated Method   % Neutrophils Latest Units: % 74.5   % Lymphocytes Latest Units: % 17.1   % Monocytes Latest Units: % 6.3   % Eosinophils Latest Units: % 1.1   % Basophils Latest Units: % 0.6   % Immature Granulocytes Latest Units: % 0.4   Nucleated RBCs Latest Ref Range: 0 /100 0   Absolute Neutrophil Latest Ref Range: 1.6 - 8.3 10e9/L 6.3   Absolute Lymphocytes Latest Ref Range: 0.8 - 5.3 10e9/L 1.4   Absolute Monocytes Latest Ref Range: 0.0 - 1.3 10e9/L 0.5   Absolute  Basophils Latest Ref Range: 0.0 - 0.2 10e9/L 0.1   Abs Immature Granulocytes Latest Ref Range: 0 - 0.4 10e9/L 0.0   Absolute Nucleated RBC Unknown 0.0     Imaging: no new imaging    Assessment and plan:    1. History of left sided stage 2 breast cancer, ER/[place] negative, HER2 positive s/p neoadjuvant chemo, bilateral mastectomy with pCR who has completed a year of herceptin. She is on surveillance and is 3 years out from completing treatment. No symptoms concerning for recurrence at this time  -Follow-up 6 mos Dr. Ferguson. No labs needed    2. Anemia. Iron, folate and B12 checked on last visit and unremarkable. Hgb stable. Follow per PCP. If creeping down, we will re-evaluate.     Over 50% of 13 min visit was spent counseling and coordinating care    Veronica Lazar PA-C

## 2020-12-15 ENCOUNTER — MYC MEDICAL ADVICE (OUTPATIENT)
Dept: FAMILY MEDICINE | Facility: OTHER | Age: 70
End: 2020-12-15

## 2020-12-15 ENCOUNTER — VIRTUAL VISIT (OUTPATIENT)
Dept: ONCOLOGY | Facility: CLINIC | Age: 70
End: 2020-12-15
Payer: COMMERCIAL

## 2020-12-15 DIAGNOSIS — C50.412 MALIGNANT NEOPLASM OF UPPER-OUTER QUADRANT OF LEFT FEMALE BREAST, UNSPECIFIED ESTROGEN RECEPTOR STATUS (H): Primary | ICD-10-CM

## 2020-12-15 DIAGNOSIS — D64.9 ANEMIA, UNSPECIFIED TYPE: ICD-10-CM

## 2020-12-15 PROCEDURE — 99213 OFFICE O/P EST LOW 20 MIN: CPT | Mod: 95 | Performed by: PHYSICIAN ASSISTANT

## 2020-12-15 NOTE — PATIENT INSTRUCTIONS
Follow-up 6 months Dr. Ferguson. No labs needed    Today:  Plan to follow up in 6 months.    Please follow up with Dr. Ferguson in 6 months. No labs prior.      Office visit follow up with Dr. Ferguson Date/Time: 6/17/21 @12:30pm       If you have any questions or concerns please feel free to call.    If you need to reschedule please call:  Clinic or Lab Appointment - [phone]  Infusion - [phone]  Imaging - [phone]    Veronica Hale [facility]  [phone]

## 2020-12-15 NOTE — PROGRESS NOTES
"Michaela Dorman is a 70 year old female who is being evaluated via a billable video visit.      The patient has been notified of following:     \"This video visit will be conducted via a call between you and your physician/provider. We have found that certain health care needs can be provided without the need for an in-person physical exam.  This service lets us provide the care you need with a video conversation.  If a prescription is necessary we can send it directly to your pharmacy.  If lab work is needed we can place an order for that and you can then stop by our lab to have the test done at a later time.    Video visits are billed at different rates depending on your insurance coverage.  Please reach out to your insurance provider with any questions.    If during the course of the call the physician/provider feels a video visit is not appropriate, you will not be charged for this service.\"    Patient has given verbal consent for Video visit? Yes  How would you like to obtain your AVS? MyChart  If you are dropped from the video visit, the video invite should be resent to: Text to cell phone: [phone]  Will anyone else be joining your video visit? No        Video-Visit Details    Type of service:  Video Visit    Video Start Time: 12:30pm  Video End Time: 12:43pm    Originating Location (pt. Location): Home    Distant Location (provider location):  [facility]     Platform used for Video Visit: Brooks Lazar PA-C        "

## 2020-12-15 NOTE — LETTER
12/15/2020         RE: Michaela Dorman  [address]  [facility] [address]          Veronica Lazar PA-C

## 2020-12-16 DIAGNOSIS — Z51.11 ENCOUNTER FOR ANTINEOPLASTIC CHEMOTHERAPY: ICD-10-CM

## 2020-12-16 DIAGNOSIS — F43.21 ADJUSTMENT DISORDER WITH DEPRESSED MOOD: ICD-10-CM

## 2020-12-16 DIAGNOSIS — F41.9 ANXIETY: ICD-10-CM

## 2020-12-18 RX ORDER — PROCHLORPERAZINE MALEATE 10 MG
TABLET ORAL
Qty: 30 TABLET | Refills: 0 | Status: SHIPPED | OUTPATIENT
Start: 2020-12-18 | End: 2021-05-03

## 2020-12-18 RX ORDER — ESCITALOPRAM OXALATE 10 MG/1
TABLET ORAL
Qty: 90 TABLET | Refills: 1 | Status: SHIPPED | OUTPATIENT
Start: 2020-12-18 | End: 2021-04-01

## 2020-12-18 RX ORDER — ESCITALOPRAM OXALATE 20 MG/1
TABLET ORAL
Qty: 90 TABLET | Refills: 1 | Status: SHIPPED | OUTPATIENT
Start: 2020-12-18 | End: 2021-05-05

## 2020-12-18 RX ORDER — ALPRAZOLAM 0.5 MG
TABLET ORAL
Qty: 30 TABLET | Refills: 1 | Status: SHIPPED | OUTPATIENT
Start: 2020-12-18 | End: 2021-03-01

## 2020-12-18 NOTE — TELEPHONE ENCOUNTER
Pending Prescriptions:                       Disp   Refills    escitalopram (LEXAPRO) 10 MG tablet [Pharm*90 tab*         Sig: TAKE 1 TABLET BY MOUTH EVERY DAY WITH 20MG TABLETS    escitalopram (LEXAPRO) 20 MG tablet [Pharm*90 tab*         Sig: TAKE 1 TABLET BY MOUTH EVERY DAY WITH 20MG TABLETS    ALPRAZolam (XANAX) 0.5 MG tablet [Pharmacy*30 tab*         Sig: TAKE 1 TABLET BY MOUTH DAILY AT BEDTIME AS NEEDED FOR           SLEEP    prochlorperazine (COMPAZINE) 10 MG tablet *30 tab*0        Sig: TAKE 1 TABLET BY MOUTH EVERY 6 HOURS AS NEEDED FOR           NAUSEA AND VOMITING    Routing refill request to provider for review/approval because:  Drug not on the G refill protocol   Labs out of range:  overdue  PHQ-9 score:    PHQ 6/16/2020   PHQ-9 Total Score 3   Q9: Thoughts of better off dead/self-harm past 2 weeks Not at all

## 2020-12-21 ENCOUNTER — MYC MEDICAL ADVICE (OUTPATIENT)
Dept: FAMILY MEDICINE | Facility: OTHER | Age: 70
End: 2020-12-21

## 2020-12-23 NOTE — PROGRESS NOTES
Subjective     Michaela Dorman is a 70 year old female who presents to clinic today for the following health issues:  Does patient want to do medicare annual wellness physical today? no  If yes add questions in superlist.    HPI         Genitourinary - Female  Onset/Duration:   Description:   Painful urination (Dysuria): no           Frequency: YES  Blood in urine (Hematuria): no  Delay in urine (Hesitency): no  Intensity: mild  Progression of Symptoms:  improving  Accompanying Signs & Symptoms:  Fever/chills: no  Flank pain: no  Nausea and vomiting: no  Vaginal symptoms: discharge and itching  Abdominal/Pelvic Pain: YES  History:   History of frequent UTI s: YES  History of kidney stones: YES  Sexually Active: no  Possibility of pregnancy: No  Precipitating or alleviating factors: None  Therapies tried and outcome: estrogen cream     Dizziness  Onset/Duration: few years off an on then last 2 weeks has been constant  Description:   Do you feel faint: YES  Does it feel like the surroundings (bed, room) are moving: no  Unsteady/off balance: YES  Have you passed out or fallen: no  Intensity: moderate  Progression of Symptoms: worsening  Accompanying Signs & Symptoms:  Heart palpitations or chest pain: no  Nausea, vomiting: no  Weakness or lack of coordination in arms or legs: YES  Vision or speech changes: no  Numbness or tingling: no  Ringing in ears (Tinnitus): YES- has history of this. It is not changed  Hearing Loss: no  History:   Head trauma/concussion history: no  Previous similar symptoms: YES- very rare and very mild for a few years  Recent bleeding history: no  Any new medications (BP?): Stopped taking BP medications a few months ago.  Precipitating factors:   Worse with activity: YES  Worse with head movement: not sure  Alleviating factors:   Does staying in a fixed position give relief: YES- laying head down  Therapies tried and outcome: None      Review of Systems   Constitutional, HEENT, cardiovascular,  pulmonary, GI, , musculoskeletal, neuro, skin, endocrine and psych systems are negative, except as otherwise noted.      Objective    BP (!) 140/90   Pulse 80   Temp 97.7  F (36.5  C) (Temporal)   Resp 14   Wt 63.5 kg (140 lb)   SpO2 98%   BMI 22.60 kg/m    Body mass index is 22.6 kg/m .  Physical Exam   GENERAL: healthy, alert and no distress  RESP: lungs clear to auscultation - no rales, rhonchi or wheezes  CV: regular rate and rhythm, normal S1 S2, no S3 or S4, no murmur, click or rub, no peripheral edema and peripheral pulses strong  ABDOMEN: soft, nontender, no hepatosplenomegaly, no masses and bowel sounds normal  MS: no gross musculoskeletal defects noted, no edema  NEURO: Normal strength and tone, sensory exam grossly normal and mentation intact  PSYCH: mentation appears normal, affect normal/bright    Results for orders placed or performed in visit on 12/24/20 (from the past 24 hour(s))   *UA reflex to Microscopic and Culture ([place] [facility] (except [place] and [place])    Specimen: Midstream Urine   Result Value Ref Range    Color Urine Yellow     Appearance Urine Clear     Glucose Urine Negative NEG^Negative mg/dL    Bilirubin Urine Negative NEG^Negative    Ketones Urine Negative NEG^Negative mg/dL    Specific Gravity Urine 1.020 1.003 - 1.035    Blood Urine Negative NEG^Negative    pH Urine 8.5 (H) 5.0 - 7.0 pH    Protein Albumin Urine Negative NEG^Negative mg/dL    Urobilinogen Urine 0.2 0.2 - 1.0 EU/dL    Nitrite Urine Negative NEG^Negative    Leukocyte Esterase Urine Trace (A) NEG^Negative    Source Midstream Urine    Urine Microscopic   Result Value Ref Range    WBC Urine 5-10 (A) OTO5^0 - 5 /HPF    RBC Urine 2-5 (A) OTO2^O - 2 /HPF    Squamous Epithelial /LPF Urine Few FEW^Few /LPF    Renal Tub Epi Few (A) NEG^Negative /HPF    Bacteria Urine Few (A) NEG^Negative /HPF   CBC with platelets   Result Value Ref Range    WBC 7.4 4.0 - 11.0 10e9/L    RBC Count 3.31 (L) 3.8 - 5.2  10e12/L    Hemoglobin 11.1 (L) 11.7 - 15.7 g/dL    Hematocrit 34.5 (L) 35.0 - 47.0 %     (H) 78 - 100 fl    MCH 33.5 (H) 26.5 - 33.0 pg    MCHC 32.2 31.5 - 36.5 g/dL    RDW 14.1 10.0 - 15.0 %    Platelet Count 253 150 - 450 10e9/L     *Note: Due to a large number of results and/or encounters for the requested time period, some results have not been displayed. A complete set of results can be found in Results Review.           Assessment & Plan     Michaela was seen today for recheck and dizziness.    Diagnoses and all orders for this visit:    Acute cystitis with hematuria  -     Albumin Random Urine Quantitative with Creat Ratio  -     *UA reflex to Microscopic and Culture ([place] [facility] (except [place] and [place])  -     CBC with platelets  -     Comprehensive metabolic panel  -     Urine Microscopic  -     Urine Culture Aerobic Bacterial  -     ciprofloxacin (CIPRO) 250 MG tablet; Take 1 tablet (250 mg) by mouth 2 times daily  -     NEPHROLOGY ADULT REFERRAL  -     fluconazole (DIFLUCAN) 150 MG tablet; Take 1 tablet (150 mg) by mouth daily for 3 days    Stage 3 chronic kidney disease, unspecified whether stage 3a or 3b CKD  -     ciprofloxacin (CIPRO) 250 MG tablet; Take 1 tablet (250 mg) by mouth 2 times daily  -     NEPHROLOGY ADULT REFERRAL  -     fluconazole (DIFLUCAN) 150 MG tablet; Take 1 tablet (150 mg) by mouth daily for 3 days    Benign paroxysmal positional vertigo, bilateral  -     PHYSICAL THERAPY REFERRAL; Future            Patient reports a scant vaginal discharge and itchiness to this area.  She is stopped taking the external cream because of the way that it made her feel.  Due to the debris that we see within her urine I have asked her to see a nephrologist for further evaluation and treatment options.  We may indeed need to be sending her to OB/GYN for vaginal atrophy related irritation of the urethra in the near future.    No follow-ups on file.    SANDY Howell  [facility]

## 2020-12-24 ENCOUNTER — OFFICE VISIT (OUTPATIENT)
Dept: FAMILY MEDICINE | Facility: OTHER | Age: 70
End: 2020-12-24
Payer: COMMERCIAL

## 2020-12-24 VITALS
HEART RATE: 80 BPM | TEMPERATURE: 97.7 F | DIASTOLIC BLOOD PRESSURE: 90 MMHG | SYSTOLIC BLOOD PRESSURE: 140 MMHG | WEIGHT: 140 LBS | OXYGEN SATURATION: 98 % | BODY MASS INDEX: 22.6 KG/M2 | RESPIRATION RATE: 14 BRPM

## 2020-12-24 DIAGNOSIS — N18.30 STAGE 3 CHRONIC KIDNEY DISEASE, UNSPECIFIED WHETHER STAGE 3A OR 3B CKD (H): ICD-10-CM

## 2020-12-24 DIAGNOSIS — N30.01 ACUTE CYSTITIS WITH HEMATURIA: Primary | ICD-10-CM

## 2020-12-24 DIAGNOSIS — H81.13 BENIGN PAROXYSMAL POSITIONAL VERTIGO, BILATERAL: ICD-10-CM

## 2020-12-24 LAB
ALBUMIN SERPL-MCNC: 4.2 G/DL (ref 3.4–5)
ALBUMIN UR-MCNC: NEGATIVE MG/DL
ALP SERPL-CCNC: 42 U/L (ref 40–150)
ALT SERPL W P-5'-P-CCNC: 24 U/L (ref 0–50)
ANION GAP SERPL CALCULATED.3IONS-SCNC: 5 MMOL/L (ref 3–14)
APPEARANCE UR: CLEAR
AST SERPL W P-5'-P-CCNC: 21 U/L (ref 0–45)
BACTERIA #/AREA URNS HPF: ABNORMAL /HPF
BILIRUB SERPL-MCNC: 0.3 MG/DL (ref 0.2–1.3)
BILIRUB UR QL STRIP: NEGATIVE
BUN SERPL-MCNC: 19 MG/DL (ref 7–30)
CALCIUM SERPL-MCNC: 9.9 MG/DL (ref 8.5–10.1)
CHLORIDE SERPL-SCNC: 103 MMOL/L (ref 94–109)
CO2 SERPL-SCNC: 28 MMOL/L (ref 20–32)
COLOR UR AUTO: YELLOW
CREAT SERPL-MCNC: 1.2 MG/DL (ref 0.52–1.04)
CREAT UR-MCNC: 61 MG/DL
ERYTHROCYTE [DISTWIDTH] IN BLOOD BY AUTOMATED COUNT: 14.1 % (ref 10–15)
GFR SERPL CREATININE-BSD FRML MDRD: 45 ML/MIN/{1.73_M2}
GLUCOSE SERPL-MCNC: 86 MG/DL (ref 70–99)
GLUCOSE UR STRIP-MCNC: NEGATIVE MG/DL
HCT VFR BLD AUTO: 34.5 % (ref 35–47)
HGB BLD-MCNC: 11.1 G/DL (ref 11.7–15.7)
HGB UR QL STRIP: NEGATIVE
KETONES UR STRIP-MCNC: NEGATIVE MG/DL
LEUKOCYTE ESTERASE UR QL STRIP: ABNORMAL
MCH RBC QN AUTO: 33.5 PG (ref 26.5–33)
MCHC RBC AUTO-ENTMCNC: 32.2 G/DL (ref 31.5–36.5)
MCV RBC AUTO: 104 FL (ref 78–100)
MICROALBUMIN UR-MCNC: 30 MG/L
MICROALBUMIN/CREAT UR: 49.01 MG/G CR (ref 0–25)
NITRATE UR QL: NEGATIVE
NON-SQ EPI CELLS #/AREA URNS LPF: ABNORMAL /LPF
PH UR STRIP: 8.5 PH (ref 5–7)
PLATELET # BLD AUTO: 253 10E9/L (ref 150–450)
POTASSIUM SERPL-SCNC: 4.9 MMOL/L (ref 3.4–5.3)
PROT SERPL-MCNC: 7.9 G/DL (ref 6.8–8.8)
RBC # BLD AUTO: 3.31 10E12/L (ref 3.8–5.2)
RBC #/AREA URNS AUTO: ABNORMAL /HPF
RENAL EPI CELLS #/AREA URNS HPF: ABNORMAL /HPF
SODIUM SERPL-SCNC: 136 MMOL/L (ref 133–144)
SOURCE: ABNORMAL
SP GR UR STRIP: 1.02 (ref 1–1.03)
UROBILINOGEN UR STRIP-ACNC: 0.2 EU/DL (ref 0.2–1)
WBC # BLD AUTO: 7.4 10E9/L (ref 4–11)
WBC #/AREA URNS AUTO: ABNORMAL /HPF

## 2020-12-24 PROCEDURE — 36415 COLL VENOUS BLD VENIPUNCTURE: CPT | Performed by: PHYSICIAN ASSISTANT

## 2020-12-24 PROCEDURE — 85027 COMPLETE CBC AUTOMATED: CPT | Performed by: PHYSICIAN ASSISTANT

## 2020-12-24 PROCEDURE — 87086 URINE CULTURE/COLONY COUNT: CPT | Performed by: PHYSICIAN ASSISTANT

## 2020-12-24 PROCEDURE — 87088 URINE BACTERIA CULTURE: CPT | Performed by: PHYSICIAN ASSISTANT

## 2020-12-24 PROCEDURE — 87186 SC STD MICRODIL/AGAR DIL: CPT | Performed by: PHYSICIAN ASSISTANT

## 2020-12-24 PROCEDURE — 82043 UR ALBUMIN QUANTITATIVE: CPT | Performed by: PHYSICIAN ASSISTANT

## 2020-12-24 PROCEDURE — 80053 COMPREHEN METABOLIC PANEL: CPT | Performed by: PHYSICIAN ASSISTANT

## 2020-12-24 PROCEDURE — 99214 OFFICE O/P EST MOD 30 MIN: CPT | Performed by: PHYSICIAN ASSISTANT

## 2020-12-24 PROCEDURE — 81001 URINALYSIS AUTO W/SCOPE: CPT | Performed by: PHYSICIAN ASSISTANT

## 2020-12-24 RX ORDER — CIPROFLOXACIN 250 MG/1
250 TABLET, FILM COATED ORAL 2 TIMES DAILY
Qty: 28 TABLET | Refills: 0 | Status: SHIPPED | OUTPATIENT
Start: 2020-12-24 | End: 2021-02-08

## 2020-12-24 RX ORDER — FLUCONAZOLE 150 MG/1
150 TABLET ORAL DAILY
Qty: 3 TABLET | Refills: 0 | Status: SHIPPED | OUTPATIENT
Start: 2020-12-24 | End: 2020-12-27

## 2020-12-26 LAB
BACTERIA SPEC CULT: ABNORMAL
BACTERIA SPEC CULT: ABNORMAL
Lab: ABNORMAL
SPECIMEN SOURCE: ABNORMAL

## 2021-01-04 ENCOUNTER — MYC MEDICAL ADVICE (OUTPATIENT)
Dept: FAMILY MEDICINE | Facility: OTHER | Age: 71
End: 2021-01-04

## 2021-01-05 ENCOUNTER — VIRTUAL VISIT (OUTPATIENT)
Dept: NEPHROLOGY | Facility: CLINIC | Age: 71
End: 2021-01-05
Payer: COMMERCIAL

## 2021-01-05 DIAGNOSIS — I95.9 HYPOTENSION, UNSPECIFIED HYPOTENSION TYPE: ICD-10-CM

## 2021-01-05 DIAGNOSIS — K21.9 GASTROESOPHAGEAL REFLUX DISEASE WITHOUT ESOPHAGITIS: ICD-10-CM

## 2021-01-05 DIAGNOSIS — N18.31 STAGE 3A CHRONIC KIDNEY DISEASE (H): Primary | ICD-10-CM

## 2021-01-05 DIAGNOSIS — G89.4 CHRONIC PAIN SYNDROME: ICD-10-CM

## 2021-01-05 DIAGNOSIS — D64.9 ANEMIA, UNSPECIFIED TYPE: ICD-10-CM

## 2021-01-05 DIAGNOSIS — N39.0 RECURRENT UTI: ICD-10-CM

## 2021-01-05 PROCEDURE — 99204 OFFICE O/P NEW MOD 45 MIN: CPT | Mod: 95 | Performed by: INTERNAL MEDICINE

## 2021-01-05 NOTE — PROGRESS NOTES
Michaela Dorman is a 70 year old female who is being evaluated via a billable video visit.      How would you like to obtain your AVS? MyChart  If the video visit is dropped, the invitation should be resent by: Text to cell phone: [phone]  Will anyone else be joining your video visit? Bambi Scott LPN    Date: 1/02/21 Blood Pressure: 62/39           Pulse: 83  10:00am  Date: 1/02/21 Blood Pressure: 65/45           Pulse: 92  2:00pm  Date: 1/03/21 Blood Pressure: 62/36           Pulse: 105  7:00am  Date: 1/03/21 Blood Pressure: 59/46           Pulse: 106  8:00am  Date: 1/03/21 Blood Pressure: 84/53           Pulse: 95  Noon  Date: 1/03/21 Blood Pressure: 118/63           Pulse: 96  2:00pm

## 2021-01-05 NOTE — PATIENT INSTRUCTIONS
1. Stay off hydrochlorothiazide  2. Try to work away from celebrex - tylenol is ok in its place  3. Trial stopping the prevacid.   If your reflux returns:   -try taking famotidine (pepcid) 20 mg daily.  - If ongoing difficulties with reflux despite starting the famotidine, then discontinue the famotidine and go back to taking prevacid.     4. Plan to get lab and urine studies on 1/12/21 when you are at [facility] for your ultrasound.

## 2021-01-05 NOTE — PROGRESS NOTES
"January 5, 2021    I was asked to see this patient by Phani Tapia PA-C for evaluation of \"ongoing UTI with renal function concerns\"    CC: elevated creatinine    HPI: Michaela Dorman is a 70 year old female who presents for evaluation of elevated creatinine. Ms. Jean has had difficulties with recurrent UTIs- on/off for years.  - referred to urology as well - started on bactrim single strength for prophylaxis in late November. Plan in November was to follow-up with Dr. Shaw in urology again and at that time will undergo cystoscopy as well. On review of her creatinine levels, her true baseline seems to be around 0.8 but with episodes of elevation (up to 1.2 in 2012, 1.45 in May 2019, more recently sitting 1.1-1.2 this year). On review of UAs, she has had hematuria; UMaCR typically normal but 40 mg/g in Dec 2020.    Today she reports that she has stopped the bactrim due to ongoing infections despite being on it; off for one week. She is now on ciprofloxacin; also recent difficulties with yeast infections. BP most recently has been low. She feels weak/dizzy with these low pressures - by afternoon is able to walk 3 miles. She is off of hydrochlorothiazide now; 162/75 yesterday later in the day so did take a tab. She is eating/drinking well. No fever/chills. She feels the UTI is gone at this time. No diarrhea. No irregular heart rate appreciated. She reports low blood pressures for years on/off - now 3-4 months with very low pressures.    She is taking celebrex for back pain - had an extensive back surgery in early 2020. Currently taking celebrex once a day. Prior to 2020 was not taking NSAIDs. On prevacid for heart burn - has been on this for long time - no heart burn sxs for a long time. No hx of ulcers/esophagitis. Takes miralax daily. Takes magnesium daily. Addt hx breast cancer with mastectomy/chemo/rx.     - History of Hematuria: no  - Swelling: no  - Hx of UTIs: yes - see above  - Hx of stones: no  - " Rashes/Joint pain: no rash; back pain present - hx of surgery  - Family hx of kidney disease: no  - NSAID use: no other NSAIDs; just celebrex       Allergies   Allergen Reactions     No Known Drug Allergies             acetaminophen (TYLENOL) 500 MG tablet, Take 1,000 mg by mouth 3 times daily as needed for mild pain (500MG X 2 = 1,000MG)       ALPRAZolam (XANAX) 0.5 MG tablet, TAKE 1 TABLET BY MOUTH DAILY AT BEDTIME AS NEEDED FOR SLEEP       aspirin (ASA) 81 MG chewable tablet, Take 1 tablet (81 mg) by mouth daily       atorvastatin (LIPITOR) 20 MG tablet, TAKE ONE TABLET BY MOUTH ONCE DAILY       B-12 METHYLCOBALAMIN PO, Take 5,000 mcg by mouth every morning       celecoxib (CELEBREX) 200 MG capsule, Take 1 capsule (200 mg) by mouth 2 times daily       cholecalciferol (VITAMIN D3) 5000 units (125 mcg) capsule, Take 5,000 Units by mouth every morning        ciprofloxacin (CIPRO) 250 MG tablet, Take 1 tablet (250 mg) by mouth 2 times daily       escitalopram (LEXAPRO) 10 MG tablet, TAKE 1 TABLET BY MOUTH EVERY DAY WITH 20MG TABLETS       escitalopram (LEXAPRO) 20 MG tablet, TAKE 1 TABLET BY MOUTH EVERY DAY WITH 20MG TABLETS       ferrous sulfate (FEROSUL) 325 (65 Fe) MG tablet, Take 325 mg by mouth every morning       Garlic 1000 MG CAPS, Take 1,000 mg by mouth every morning       hydrochlorothiazide (HYDRODIURIL) 25 MG tablet, TAKE ONE TABLET BY MOUTH ONCE DAILY (Patient taking differently: 1/05/2021 pt reports taking 1/2 tablet daily)       Lansoprazole (PREVACID PO), Take 40 mg by mouth every morning        Magnesium Oxide 500 MG TABS, Take 500 mg by mouth every morning       polyethylene glycol (MIRALAX/GLYCOLAX) powder, Take 1 capful by mouth every morning        prochlorperazine (COMPAZINE) 10 MG tablet, TAKE 1 TABLET BY MOUTH EVERY 6 HOURS AS NEEDED FOR NAUSEA AND VOMITING       Psyllium (METAMUCIL FIBER PO), Take 2 teaspoonful by mouth every morning (mix in with liquid and drink)       senna-docusate  (SENOKOT-S/PERICOLACE) 8.6-50 MG tablet, Take 2 tablets by mouth 2 times daily (Patient taking differently: Take 2 tablets by mouth 2 times daily as needed for constipation )       vitamin C (ASCORBIC ACID) 1000 MG TABS, Take 1,000 mg by mouth every morning       Zinc 30 MG CAPS, Take 30 mg by mouth every morning        COMPOUNDED NON-CONTROLLED SUBSTANCE (CMPD RX) - PHARMACY TO MIX COMPOUNDED MEDICATION, Estriol 0.1% cream (1mg/gram) in HRT base. Place 1 gram vaginally daily for 2 weeks, then vaginally twice weekly. (Patient not taking: Reported on 1/5/2021)       diphenhydrAMINE (BENADRYL) 25 MG tablet, Take 25 mg by mouth nightly as needed for itching or allergies       sulfamethoxazole-trimethoprim (BACTRIM) 400-80 MG tablet, Take 1 tablet by mouth At Bedtime (Patient not taking: Reported on 1/5/2021)       tamsulosin (FLOMAX) 0.4 MG capsule, TAKE ONE CAPSULE BY MOUTH AT BEDTIME (Patient not taking: Reported on 1/5/2021)    No current facility-administered medications on file prior to visit.       Past Medical History:   Diagnosis Date     Carotid stenosis, bilateral L80%, R40% 9/2/2020     Central stenosis of spinal canal 12/1/2017    lumbar      DDD (degenerative disc disease), lumbar 12/1/2017     Depressive disorder, not elsewhere classified      Esophageal reflux      ETOH abuse     in remission     Foraminal stenosis of lumbar region 12/1/2017    Complete lumbar spine left at various degrees and to a lesser extent the right as well.     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     Subdural hematoma (H)        Past Surgical History:   Procedure Laterality Date     ARTHROPLASTY SHOULDER Right 11/17/2015     ARTHROSCOPY KNEE  6/7/2012    Procedure:ARTHROSCOPY KNEE; right knee arthroscopy with partial lateral menisectomy; Surgeon:DAMIÁN MCALLISTER; Location: OR      C LIGATE FALLOPIAN TUBE       COLONOSCOPY N/A 2017    Procedure: COLONOSCOPY;  colonoscopy;  Surgeon: Chuck Chand MD;  Location: [facility]     ENDARTERECTOMY CAROTID Left 10/8/2020    Procedure: LEFT CAROTID ENDARTERECTOMY WITH EEG;  Surgeon: Lacho Jasmine MD;  Location: [facility]     EXCISE MASS AXILLA Left 2016    Procedure: EXCISE MASS AXILLA;  Surgeon: Keyon Blanco MD;  Location: [facility]     HC REMV CATARACT EXTRACAP,INSERT LENS, W/O ECP  2009    Right eye     INJECT EPIDURAL LUMBAR N/A 2019    Procedure: interlaminar epidual steroid injection lumbar 4-5;  Surgeon: Oskar Salvador MD;  Location: [facility]     INJECT EPIDURAL LUMBAR Bilateral 2019    Procedure: lumbar 4-5 epidural interlaminar steroid injection;  Surgeon: Oskar Salvador MD;  Location: [facility]     INSERT PORT VASCULAR ACCESS Right 10/7/2016    Procedure: INSERT PORT VASCULAR ACCESS;  Surgeon: Keyon Blanco MD;  Location: [facility]     MASTECTOMY SIMPLE BILATERAL Bilateral 2017    Procedure: MASTECTOMY SIMPLE BILATERAL;  Surgeon: Keyon Blanco MD;  Location: [facility]     OPEN REDUCTION INTERNAL FIXATION FOOT Right 3/20/2015    Procedure: OPEN REDUCTION INTERNAL FIXATION FOOT;  Surgeon: Javi Herrmann DPM;  Location: [facility]     OPTICAL TRACKING SYSTEM FUSION SPINE POSTERIOR LUMBAR TWO LEVELS N/A 2020    Procedure: LUMBAR 2-SACRAL1 TRANSFORMINAL INTERBODY FUSION;  Surgeon: Lneny Gomes MD;  Location: [facility]     REMOVE PORT VASCULAR ACCESS Right 2018    Procedure: REMOVE PORT VASCULAR ACCESS;  right intra jugular port removal;  Surgeon: Keyon Blanco MD;  Location: [facility]     SHOULDER SURGERY Right 2015     ZZC NONSPECIFIC PROCEDURE      ankle fracture surgery       Social History     Tobacco Use     Smoking status: Former Smoker     Packs/day: 3.00     Years: 10.00     Pack years: 30.00     Types: Cigarettes     Quit date: 1979     Years since quittin.1     Smokeless tobacco:  Never Used     Tobacco comment: approx. 3 packs daily   Substance Use Topics     Alcohol use: Yes     Alcohol/week: 0.0 standard drinks     Comment: daily glass of wine     Drug use: No       Family History   Problem Relation Age of Onset     Hypertension Mother      Thyroid Disease Mother      Cerebrovascular Disease Mother      Hypertension Father      Cerebrovascular Disease Father      Cancer Father         skin     Thyroid Disease Sister      Diabetes Brother         type 2     Depression Sister      Breast Cancer Sister         age 47     Cancer Sister         skin     Cancer Brother         inside nose     Exam:  There were no vitals taken for this visit.  GENERAL: Healthy, alert and no distress  EYES: Eyes grossly normal to inspection.  No discharge or erythema, or obvious scleral/conjunctival abnormalities.  RESP: No audible wheeze, cough, or visible cyanosis.  No visible retractions or increased work of breathing.    SKIN: Visible skin clear. No significant rash, abnormal pigmentation or lesions.  NEURO: Cranial nerves grossly intact.  Mentation and speech appropriate for age.  PSYCH: Mentation appears normal, affect normal/bright, judgement and insight intact, normal speech and appearance well-groomed.     Results:    No visits with results within 1 Day(s) from this visit.   Latest known visit with results is:   Office Visit on 12/24/2020   Component Date Value Ref Range Status     Creatinine Urine 12/24/2020 61  mg/dL Final     Albumin Urine mg/L 12/24/2020 30  mg/L Final     Albumin Urine mg/g Cr 12/24/2020 49.01* 0 - 25 mg/g Cr Final     Color Urine 12/24/2020 Yellow   Final     Appearance Urine 12/24/2020 Clear   Final     Glucose Urine 12/24/2020 Negative  NEG^Negative mg/dL Final     Bilirubin Urine 12/24/2020 Negative  NEG^Negative Final     Ketones Urine 12/24/2020 Negative  NEG^Negative mg/dL Final     Specific Gravity Urine 12/24/2020 1.020  1.003 - 1.035 Final     Blood Urine 12/24/2020  Negative  NEG^Negative Final     pH Urine 12/24/2020 8.5* 5.0 - 7.0 pH Final     Protein Albumin Urine 12/24/2020 Negative  NEG^Negative mg/dL Final     Urobilinogen Urine 12/24/2020 0.2  0.2 - 1.0 EU/dL Final     Nitrite Urine 12/24/2020 Negative  NEG^Negative Final     Leukocyte Esterase Urine 12/24/2020 Trace* NEG^Negative Final     Source 12/24/2020 Midstream Urine   Final     WBC 12/24/2020 7.4  4.0 - 11.0 10e9/L Final     RBC Count 12/24/2020 3.31* 3.8 - 5.2 10e12/L Final     Hemoglobin 12/24/2020 11.1* 11.7 - 15.7 g/dL Final     Hematocrit 12/24/2020 34.5* 35.0 - 47.0 % Final     MCV 12/24/2020 104* 78 - 100 fl Final     MCH 12/24/2020 33.5* 26.5 - 33.0 pg Final     MCHC 12/24/2020 32.2  31.5 - 36.5 g/dL Final     RDW 12/24/2020 14.1  10.0 - 15.0 % Final     Platelet Count 12/24/2020 253  150 - 450 10e9/L Final     Sodium 12/24/2020 136  133 - 144 mmol/L Final     Potassium 12/24/2020 4.9  3.4 - 5.3 mmol/L Final     Chloride 12/24/2020 103  94 - 109 mmol/L Final     Carbon Dioxide 12/24/2020 28  20 - 32 mmol/L Final     Anion Gap 12/24/2020 5  3 - 14 mmol/L Final     Glucose 12/24/2020 86  70 - 99 mg/dL Final     Urea Nitrogen 12/24/2020 19  7 - 30 mg/dL Final     Creatinine 12/24/2020 1.20* 0.52 - 1.04 mg/dL Final     GFR Estimate 12/24/2020 45* >60 mL/min/[1.73_m2] Final    Comment: Non  GFR Calc  Starting 12/18/2018, serum creatinine based estimated GFR (eGFR) will be   calculated using the Chronic Kidney Disease Epidemiology Collaboration   (CKD-EPI) equation.       GFR Estimate If Black 12/24/2020 53* >60 mL/min/[1.73_m2] Final    Comment:  GFR Calc  Starting 12/18/2018, serum creatinine based estimated GFR (eGFR) will be   calculated using the Chronic Kidney Disease Epidemiology Collaboration   (CKD-EPI) equation.       Calcium 12/24/2020 9.9  8.5 - 10.1 mg/dL Final     Bilirubin Total 12/24/2020 0.3  0.2 - 1.3 mg/dL Final     Albumin 12/24/2020 4.2  3.4 - 5.0 g/dL Final      Protein Total 12/24/2020 7.9  6.8 - 8.8 g/dL Final     Alkaline Phosphatase 12/24/2020 42  40 - 150 U/L Final     ALT 12/24/2020 24  0 - 50 U/L Final     AST 12/24/2020 21  0 - 45 U/L Final     WBC Urine 12/24/2020 5-10* OTO5^0 - 5 /HPF Final     RBC Urine 12/24/2020 2-5* OTO2^O - 2 /HPF Final     Squamous Epithelial /LPF Urine 12/24/2020 Few  FEW^Few /LPF Final     Renal Tub Epi 12/24/2020 Few* NEG^Negative /HPF Final     Bacteria Urine 12/24/2020 Few* NEG^Negative /HPF Final     Specimen Description 12/24/2020 Midstream Urine   Final     Special Requests 12/24/2020 Specimen received in preservative   Final     Culture Micro 12/24/2020 *  Final                    Value:10,000 to 50,000 colonies/mL  Enterococcus faecalis       Culture Micro 12/24/2020    Final                    Value:<10,000 colonies/mL  urogenital cher  Susceptibility testing not routinely done         Assessment/Plan:   1. Elevated creatinine: creatinine has been elevated long enough that this could be considered CKD Stage 3. That being said, has been running higher this year and question the effect of medications on this level. Educated to stop celebrex and avoid NSAIDs. Has had very low pressures recently - now off of hydrochlorothiazide. Was on bactrim from Nov to a week ago - trimethoprim will cause a natural rise in creatinine as it blocks creatinine secretion from the distal tubule. Also discussed the higher incidence of CKD correlated with PPI use. She has no reflux sxs so will discontinue the PPI for now. Plan will be lab and urine studies as a next step. She has a follow-up planned with urology in march including cystoscopy. Imaging is planned at that time as well so will hold off on ultrasound of the kidneys for now. Given microscopic hematuria, will get general serology workup,however, only minimal microalbuminuria on last check and not previous so seems unlikely that she has an active GN.     2. Recurrent UTIs: no infectious sxs  currently - on ciprofloxacin and now away from bactrim. She has follow-up planned with Dr. Sahw as mentioned above.     3. Hypotension: has been on hydrochlorothiazide but holding now given hypotension. The etiology of her hypotension is unclear. She reports eating/drinking well. No heart palpitations/irregular rhythm appreciated. Educated to stay off of hydrochlorothiazide - if ongoing low pressures, would encourage a follow-up to the clinic where a clinic BP check/comparison should be done. If addt evaluation needed, consider TTE to assure no cardiac cause of hypotension.     4. GERD: no sxs at this time - trial of stopping PPI. If sxs recur, asked her to try famotidine instead of prevacid. If famotidine is not able to control her sxs, she can discontinue it and go back to prevacid.     5. Low back pain: educated to work away from celebrex as able. Ok to use tylenol in its place.     6. Anemia: will get iron studies.     Patient Instructions   1. Stay off hydrochlorothiazide  2. Try to work away from celebrex - tylenol is ok in its place  3. Trial stopping the prevacid.   If your reflux returns:   -try taking famotidine (pepcid) 20 mg daily.  - If ongoing difficulties with reflux despite starting the famotidine, then discontinue the famotidine and go back to taking prevacid.     4. Plan to get lab and urine studies on 1/12/21 when you are at [facility] for your ultrasound.        901 AM - 927 AM video   49 total minutes spent in chart: lab entry, chart review, documentation, visit time  Akiko Smith DO

## 2021-01-12 ENCOUNTER — OFFICE VISIT (OUTPATIENT)
Dept: OTHER | Facility: CLINIC | Age: 71
End: 2021-01-12
Attending: SURGERY
Payer: MEDICARE

## 2021-01-12 ENCOUNTER — HOSPITAL ENCOUNTER (OUTPATIENT)
Dept: LAB | Facility: CLINIC | Age: 71
End: 2021-01-12
Payer: MEDICARE

## 2021-01-12 ENCOUNTER — HOSPITAL ENCOUNTER (OUTPATIENT)
Dept: ULTRASOUND IMAGING | Facility: CLINIC | Age: 71
End: 2021-01-12
Attending: SURGERY
Payer: MEDICARE

## 2021-01-12 ENCOUNTER — MYC MEDICAL ADVICE (OUTPATIENT)
Dept: NEPHROLOGY | Facility: CLINIC | Age: 71
End: 2021-01-12

## 2021-01-12 VITALS — HEART RATE: 80 BPM | SYSTOLIC BLOOD PRESSURE: 123 MMHG | TEMPERATURE: 97.8 F | DIASTOLIC BLOOD PRESSURE: 76 MMHG

## 2021-01-12 DIAGNOSIS — Z98.890 HISTORY OF LEFT-SIDED CAROTID ENDARTERECTOMY: Primary | ICD-10-CM

## 2021-01-12 DIAGNOSIS — I65.21 ASYMPTOMATIC STENOSIS OF RIGHT CAROTID ARTERY: ICD-10-CM

## 2021-01-12 DIAGNOSIS — I65.22 SYMPTOMATIC STENOSIS OF LEFT CAROTID ARTERY: ICD-10-CM

## 2021-01-12 DIAGNOSIS — D64.9 ANEMIA, UNSPECIFIED TYPE: ICD-10-CM

## 2021-01-12 DIAGNOSIS — N18.31 STAGE 3A CHRONIC KIDNEY DISEASE (H): ICD-10-CM

## 2021-01-12 DIAGNOSIS — N39.0 RECURRENT UTI: Primary | ICD-10-CM

## 2021-01-12 LAB
ALBUMIN SERPL-MCNC: 4.2 G/DL (ref 3.4–5)
ALBUMIN UR-MCNC: NEGATIVE MG/DL
ANION GAP SERPL CALCULATED.3IONS-SCNC: 5 MMOL/L (ref 3–14)
APPEARANCE UR: CLEAR
BILIRUB UR QL STRIP: ABNORMAL
BUN SERPL-MCNC: 22 MG/DL (ref 7–30)
CALCIUM SERPL-MCNC: 10.1 MG/DL (ref 8.5–10.1)
CHLORIDE SERPL-SCNC: 106 MMOL/L (ref 94–109)
CO2 SERPL-SCNC: 27 MMOL/L (ref 20–32)
COLOR UR AUTO: ABNORMAL
CREAT SERPL-MCNC: 1.2 MG/DL (ref 0.52–1.04)
CREAT UR-MCNC: 43 MG/DL
FERRITIN SERPL-MCNC: 74 NG/ML (ref 8–252)
GFR SERPL CREATININE-BSD FRML MDRD: 45 ML/MIN/{1.73_M2}
GLUCOSE SERPL-MCNC: 79 MG/DL (ref 70–99)
GLUCOSE UR STRIP-MCNC: NEGATIVE MG/DL
HGB UR QL STRIP: NEGATIVE
IRON SATN MFR SERPL: 29 % (ref 15–46)
IRON SERPL-MCNC: 101 UG/DL (ref 35–180)
KETONES UR STRIP-MCNC: NEGATIVE MG/DL
LEUKOCYTE ESTERASE UR QL STRIP: ABNORMAL
MICROALBUMIN UR-MCNC: 11 MG/L
MICROALBUMIN/CREAT UR: 26 MG/G CR (ref 0–25)
MUCOUS THREADS #/AREA URNS LPF: PRESENT /LPF
NITRATE UR QL: POSITIVE
PH UR STRIP: 6 PH (ref 5–7)
PHOSPHATE SERPL-MCNC: 3.5 MG/DL (ref 2.5–4.5)
POTASSIUM SERPL-SCNC: 4.1 MMOL/L (ref 3.4–5.3)
PROT UR-MCNC: 0.08 G/L
PROT/CREAT 24H UR: 0.2 G/G CR (ref 0–0.2)
PTH-INTACT SERPL-MCNC: 30 PG/ML (ref 12–64)
RBC #/AREA URNS AUTO: 8 /HPF (ref 0–2)
SODIUM SERPL-SCNC: 138 MMOL/L (ref 133–144)
SOURCE: ABNORMAL
SP GR UR STRIP: 1.01 (ref 1–1.03)
SQUAMOUS #/AREA URNS AUTO: 7 /HPF (ref 0–1)
TIBC SERPL-MCNC: 351 UG/DL (ref 240–430)
UROBILINOGEN UR STRIP-MCNC: 2 MG/DL (ref 0–2)
WBC #/AREA URNS AUTO: 4 /HPF (ref 0–5)

## 2021-01-12 PROCEDURE — G0463 HOSPITAL OUTPT CLINIC VISIT: HCPCS

## 2021-01-12 PROCEDURE — 86255 FLUORESCENT ANTIBODY SCREEN: CPT | Performed by: INTERNAL MEDICINE

## 2021-01-12 PROCEDURE — 99213 OFFICE O/P EST LOW 20 MIN: CPT | Performed by: SURGERY

## 2021-01-12 PROCEDURE — 93882 EXTRACRANIAL UNI/LTD STUDY: CPT | Mod: LT

## 2021-01-12 PROCEDURE — 82728 ASSAY OF FERRITIN: CPT | Performed by: INTERNAL MEDICINE

## 2021-01-12 PROCEDURE — 81001 URINALYSIS AUTO W/SCOPE: CPT | Performed by: INTERNAL MEDICINE

## 2021-01-12 PROCEDURE — 83540 ASSAY OF IRON: CPT | Performed by: INTERNAL MEDICINE

## 2021-01-12 PROCEDURE — 80069 RENAL FUNCTION PANEL: CPT | Performed by: INTERNAL MEDICINE

## 2021-01-12 PROCEDURE — 87086 URINE CULTURE/COLONY COUNT: CPT | Performed by: SURGERY

## 2021-01-12 PROCEDURE — 83970 ASSAY OF PARATHORMONE: CPT | Performed by: INTERNAL MEDICINE

## 2021-01-12 PROCEDURE — 86160 COMPLEMENT ANTIGEN: CPT | Performed by: INTERNAL MEDICINE

## 2021-01-12 PROCEDURE — 82043 UR ALBUMIN QUANTITATIVE: CPT | Performed by: INTERNAL MEDICINE

## 2021-01-12 PROCEDURE — 84156 ASSAY OF PROTEIN URINE: CPT | Performed by: INTERNAL MEDICINE

## 2021-01-12 PROCEDURE — 36415 COLL VENOUS BLD VENIPUNCTURE: CPT | Performed by: INTERNAL MEDICINE

## 2021-01-12 PROCEDURE — 83516 IMMUNOASSAY NONANTIBODY: CPT | Performed by: INTERNAL MEDICINE

## 2021-01-12 PROCEDURE — 83550 IRON BINDING TEST: CPT | Performed by: INTERNAL MEDICINE

## 2021-01-12 NOTE — PROGRESS NOTES
"Michaela Dorman is a 70 year old female who presents for:  Chief Complaint   Patient presents with     RECHECK       History of left carotid endarterectomy 10/8/20; 3 month follow up to 10/21/20 appointment with Dr. Jasmine.        Vitals:    Vitals:    01/12/21 1446   BP: 123/76   BP Location: Right arm   Patient Position: Chair   Cuff Size: Adult Regular   Pulse: 80   Temp: 97.8  F (36.6  C)   TempSrc: Temporal       BMI:  Estimated body mass index is 22.6 kg/m  as calculated from the following:    Height as of 10/8/20: 5' 6\" (1.676 m).    Weight as of 12/24/20: 140 lb (63.5 kg).    Pain Score:  Data Unavailable        Paula Smith MA    "

## 2021-01-12 NOTE — PROGRESS NOTES
Michaela Dorman is a 70-year-old female who is 3 months status post left carotid endarterectomy performed for an asymptomatic 80% left carotid stenosis.  She presents at this time for 3-month postoperative left carotid ultrasound.  She denies dysphagia, change in her voice, or neurologic focality.  She is entirely without complaints.    Exam:  Well-developed female alert and oriented x3.  Blood pressure 123/76 with a pulse of 80.  Well-healed left-sided neck incision.  Tongue is in the midline and her smile was symmetric.  Neurologic exam nonfocal.  2+ palpable radial artery pulses bilaterally.      Imaging:  BILATERAL CAROTID ULTRASOUND   1/12/2021 2:38 PM      HISTORY:  history of left carotid endarterectomy 10/8/20; Symptomatic  stenosis of left carotid artery     COMPARISON: None.     LEFT CAROTID FINDINGS:  Postop left carotid endarterectomy minimal  mixed plaque in the common carotid artery.  Left ICA PSV:  103  cm/sec.  Left ICA EDV:  40 cm/sec.  Left ICA/CCA PSV Ratio:  1.3    These indicate less than 50% diameter stenosis of the left ICA.    Left Vertebral: Antegrade flow.   Left ECA: Antegrade flow.      Causes of Decreased Accuracy:   None.                                                                       IMPRESSION:    1. Left carotid: Postop carotid endarterectomy. Less than 50% ICA  stenosis.      ARTURO KELSEY MD    ASSESSMENT:  3 months status post left carotid endarterectomy clinically doing well with widely patent artery.    RECOMMENDATION:  I reviewed all the above with Michaela.  I have no carotid concerns.  She will continue her medical regimen including daily aspirin.  Vascular surgical follow-up will be with me in 1 year for bilateral carotid ultrasound.    Total length of this encounter was 20 minutes.    Cale Jasmine MD

## 2021-01-13 ENCOUNTER — MYC MEDICAL ADVICE (OUTPATIENT)
Dept: FAMILY MEDICINE | Facility: OTHER | Age: 71
End: 2021-01-13

## 2021-01-13 DIAGNOSIS — I65.23 CAROTID STENOSIS, BILATERAL: ICD-10-CM

## 2021-01-13 DIAGNOSIS — Z98.890 HISTORY OF LEFT-SIDED CAROTID ENDARTERECTOMY: Primary | ICD-10-CM

## 2021-01-13 DIAGNOSIS — N18.31 STAGE 3A CHRONIC KIDNEY DISEASE (H): Primary | ICD-10-CM

## 2021-01-13 LAB
ANCA AB PATTERN SER IF-IMP: NORMAL
BACTERIA SPEC CULT: NORMAL
C-ANCA TITR SER IF: NORMAL {TITER}
C3 SERPL-MCNC: 111 MG/DL (ref 81–157)
C4 SERPL-MCNC: 24 MG/DL (ref 13–39)
GBM IGG SER IA-ACNC: 0.2 AI (ref 0–0.9)
Lab: NORMAL
SPECIMEN SOURCE: NORMAL

## 2021-01-14 ENCOUNTER — MYC MEDICAL ADVICE (OUTPATIENT)
Dept: FAMILY MEDICINE | Facility: OTHER | Age: 71
End: 2021-01-14

## 2021-01-14 NOTE — TELEPHONE ENCOUNTER
"Called and spoke to patient regarding symptoms. Patient reports ongoing urinary frequency, urinary urgency, and stated, \"When I have to go I really have to go and my bladder hurts. It feels like my bladder is stabbing.\" Patient denies fevers and chills and reports that she has been taking Azo OTC which is helpful. Patient stated, \"If I don't take the Azo then I go every 5 minutes.\" Per patient, she stopped her nightly bactrim while she was taking the cipro antibiotic and has since resumed her nightly bactrim as prescribed. Informed patient that her 1/12/21 urine culture showed a small amount of mixed urogenital cher and not a specific bacteria.     Per patient, she stopped the flomax on 11/23 as recommended by Dr. Shaw. Patient stated, \"I did the estrogen cream once and it gave me a yeast infection so I stopped that.\"     Informed patient that a message will be sent to Dr. Shaw with request to review and give recommendations. Patient aware that she will be contacted with additional recommendations. Informed patient to call with any questions, concerns, or changes in symptoms in the meantime. Patient verbalized understanding and was comfortable with plan.    Iwona Meyer RN, BSN     "

## 2021-01-15 RX ORDER — NITROFURANTOIN 25; 75 MG/1; MG/1
100 CAPSULE ORAL 2 TIMES DAILY
Qty: 10 CAPSULE | Refills: 0 | Status: SHIPPED | OUTPATIENT
Start: 2021-01-15 | End: 2021-01-20

## 2021-01-15 NOTE — TELEPHONE ENCOUNTER
Geneva Zayas PA-C  You 2 hours ago (11:06 AM)     Hi Iwona,   Her UA/UC aren't strongly indicative of UTI, though her daily Bactrim could have altered results. Given her symptoms and recent positive culture on 12/24/2020, we could try a course of Macrobid 100 mg BID x 5 days. She should stop daily Bactrim while on Macrobid and may then resume once Macrobid course complete. Follow up with Dr. Shaw as scheduled.   Thanks!   Geneva Zayas PA-C      Called and spoke to patient who is aware of the above recommendations. Patient verbalized understanding and would like to start macrobid. macrobid 100mg po BID for 5 days ordered per Geneva Zayas PA-C. Prescription sent to [facility] in [place] per patient request. Patient aware to stop the nightly bactrim while taking the macrobid and then will resume bactrim when macrobid is complete.    Iwona Meyer RN, BSN

## 2021-01-20 ENCOUNTER — MYC MEDICAL ADVICE (OUTPATIENT)
Dept: FAMILY MEDICINE | Facility: OTHER | Age: 71
End: 2021-01-20

## 2021-01-20 DIAGNOSIS — R30.1 PAINFUL BLADDER SPASM: Primary | ICD-10-CM

## 2021-01-21 ENCOUNTER — MYC MEDICAL ADVICE (OUTPATIENT)
Dept: UROLOGY | Facility: CLINIC | Age: 71
End: 2021-01-21

## 2021-01-21 DIAGNOSIS — N39.0 RECURRENT UTI: Primary | ICD-10-CM

## 2021-01-21 DIAGNOSIS — N18.31 STAGE 3A CHRONIC KIDNEY DISEASE (H): ICD-10-CM

## 2021-01-21 LAB
ALBUMIN SERPL-MCNC: 4 G/DL (ref 3.4–5)
ALBUMIN UR-MCNC: NEGATIVE MG/DL
ANION GAP SERPL CALCULATED.3IONS-SCNC: 4 MMOL/L (ref 3–14)
APPEARANCE UR: ABNORMAL
BACTERIA #/AREA URNS HPF: ABNORMAL /HPF
BILIRUB UR QL STRIP: NEGATIVE
BUN SERPL-MCNC: 18 MG/DL (ref 7–30)
CALCIUM SERPL-MCNC: 9.3 MG/DL (ref 8.5–10.1)
CHLORIDE SERPL-SCNC: 104 MMOL/L (ref 94–109)
CO2 SERPL-SCNC: 28 MMOL/L (ref 20–32)
COLOR UR AUTO: YELLOW
CREAT SERPL-MCNC: 0.96 MG/DL (ref 0.52–1.04)
GFR SERPL CREATININE-BSD FRML MDRD: 59 ML/MIN/{1.73_M2}
GLUCOSE SERPL-MCNC: 86 MG/DL (ref 70–99)
GLUCOSE UR STRIP-MCNC: NEGATIVE MG/DL
HGB UR QL STRIP: NEGATIVE
KETONES UR STRIP-MCNC: NEGATIVE MG/DL
LEUKOCYTE ESTERASE UR QL STRIP: ABNORMAL
NITRATE UR QL: NEGATIVE
PH UR STRIP: 6 PH (ref 5–7)
PHOSPHATE SERPL-MCNC: 2.7 MG/DL (ref 2.5–4.5)
POTASSIUM SERPL-SCNC: 4.3 MMOL/L (ref 3.4–5.3)
RBC #/AREA URNS AUTO: 2 /HPF (ref 0–2)
SODIUM SERPL-SCNC: 136 MMOL/L (ref 133–144)
SOURCE: ABNORMAL
SP GR UR STRIP: 1.01 (ref 1–1.03)
SQUAMOUS #/AREA URNS AUTO: 1 /HPF (ref 0–1)
UROBILINOGEN UR STRIP-MCNC: 0 MG/DL (ref 0–2)
WBC #/AREA URNS AUTO: 13 /HPF (ref 0–5)

## 2021-01-21 PROCEDURE — 80069 RENAL FUNCTION PANEL: CPT | Performed by: INTERNAL MEDICINE

## 2021-01-21 PROCEDURE — 87086 URINE CULTURE/COLONY COUNT: CPT | Performed by: INTERNAL MEDICINE

## 2021-01-21 PROCEDURE — 36415 COLL VENOUS BLD VENIPUNCTURE: CPT | Performed by: INTERNAL MEDICINE

## 2021-01-21 PROCEDURE — 82306 VITAMIN D 25 HYDROXY: CPT | Performed by: INTERNAL MEDICINE

## 2021-01-21 PROCEDURE — 81001 URINALYSIS AUTO W/SCOPE: CPT | Performed by: INTERNAL MEDICINE

## 2021-01-21 RX ORDER — OXYBUTYNIN CHLORIDE 5 MG/1
5 TABLET ORAL 3 TIMES DAILY
Qty: 90 TABLET | Refills: 1 | Status: SHIPPED | OUTPATIENT
Start: 2021-01-21 | End: 2021-02-08

## 2021-01-21 NOTE — TELEPHONE ENCOUNTER
Please help her arrange the CT scan that is already ordered under separate cover.  Electronically signed:    Phani Jason PA-C

## 2021-01-22 ENCOUNTER — MYC MEDICAL ADVICE (OUTPATIENT)
Dept: FAMILY MEDICINE | Facility: OTHER | Age: 71
End: 2021-01-22

## 2021-01-22 DIAGNOSIS — N18.31 STAGE 3A CHRONIC KIDNEY DISEASE (H): Primary | ICD-10-CM

## 2021-01-22 LAB
BACTERIA SPEC CULT: NORMAL
DEPRECATED CALCIDIOL+CALCIFEROL SERPL-MC: <66 UG/L (ref 20–75)
Lab: NORMAL
SPECIMEN SOURCE: NORMAL
VITAMIN D2 SERPL-MCNC: <5 UG/L
VITAMIN D3 SERPL-MCNC: 61 UG/L

## 2021-01-22 RX ORDER — NITROFURANTOIN 25; 75 MG/1; MG/1
100 CAPSULE ORAL 2 TIMES DAILY
Qty: 10 CAPSULE | Refills: 0 | Status: SHIPPED | OUTPATIENT
Start: 2021-01-22 | End: 2021-01-27

## 2021-01-22 NOTE — TELEPHONE ENCOUNTER
Called and spoke to patient who is aware of the 1/21/21 UA/UC results. Patient continues to report urinary frequency, burning with urination, and bladder spasms. Per patient, she is voiding small amounts every 1 hour or so. Offered to send antibiotic per protocol and patient would like this. macrobid 100mg po BID for 5 days ordered per protocol. Prescription sent to [facility] in [place] per patient request. Per patient, she was started on oxybutynin for bladder spasms from her PCP and will continue that.     Patient to follow up with cysto on 3/15/21 as scheduled. Informed patient that the CT scan will need to be rescheduled to earlier in the day. Informed patient that writer will contact imaging to reschedule CT scan and then send updated time as well as cystoscopy information through my chart. Patient verbalized understanding and was comfortable with plan.    Iwona Meyer RN, BSN

## 2021-01-23 ENCOUNTER — MYC MEDICAL ADVICE (OUTPATIENT)
Dept: FAMILY MEDICINE | Facility: OTHER | Age: 71
End: 2021-01-23

## 2021-01-26 ENCOUNTER — TELEPHONE (OUTPATIENT)
Dept: UROLOGY | Facility: CLINIC | Age: 71
End: 2021-01-26

## 2021-01-26 NOTE — TELEPHONE ENCOUNTER
"Called and spoke to patient for follow up as patient's treatment of UTI with macrobid will be completed tomorrow. Patient reports that she is doing much better and is having less and less bladder spasms. Per patient, she had to stop the oxybutynin due to nausea. Informed patient that antibiotics may also cause nausea and is better if taken with food. Patient verbalized understanding and stated, \"It is all getting better.\" Encouraged patient to contact the [facility] clinic in the future with any UTI symptoms, urology questions, or concerns. Patient verbalized understanding and plans to proceed with CT and cysto as scheduled in March.       Iwona Meyer RN, BSN    "

## 2021-02-03 ENCOUNTER — MYC MEDICAL ADVICE (OUTPATIENT)
Dept: NEPHROLOGY | Facility: CLINIC | Age: 71
End: 2021-02-03

## 2021-02-03 DIAGNOSIS — N39.0 RECURRENT UTI: ICD-10-CM

## 2021-02-03 DIAGNOSIS — N39.0 RECURRENT UTI: Primary | ICD-10-CM

## 2021-02-03 DIAGNOSIS — N18.31 STAGE 3A CHRONIC KIDNEY DISEASE (H): ICD-10-CM

## 2021-02-03 LAB
ALBUMIN SERPL-MCNC: 4.1 G/DL (ref 3.4–5)
ALBUMIN UR-MCNC: NEGATIVE MG/DL
ANION GAP SERPL CALCULATED.3IONS-SCNC: 4 MMOL/L (ref 3–14)
APPEARANCE UR: CLEAR
BACTERIA #/AREA URNS HPF: ABNORMAL /HPF
BILIRUB UR QL STRIP: NEGATIVE
BUN SERPL-MCNC: 26 MG/DL (ref 7–30)
CALCIUM SERPL-MCNC: 9.3 MG/DL (ref 8.5–10.1)
CHLORIDE SERPL-SCNC: 105 MMOL/L (ref 94–109)
CO2 SERPL-SCNC: 26 MMOL/L (ref 20–32)
COLOR UR AUTO: ABNORMAL
CREAT SERPL-MCNC: 1.12 MG/DL (ref 0.52–1.04)
GFR SERPL CREATININE-BSD FRML MDRD: 49 ML/MIN/{1.73_M2}
GLUCOSE SERPL-MCNC: 83 MG/DL (ref 70–99)
GLUCOSE UR STRIP-MCNC: NEGATIVE MG/DL
HGB UR QL STRIP: NEGATIVE
KETONES UR STRIP-MCNC: NEGATIVE MG/DL
LEUKOCYTE ESTERASE UR QL STRIP: NEGATIVE
NITRATE UR QL: POSITIVE
PH UR STRIP: 5 PH (ref 5–7)
PHOSPHATE SERPL-MCNC: 3.3 MG/DL (ref 2.5–4.5)
POTASSIUM SERPL-SCNC: 4.2 MMOL/L (ref 3.4–5.3)
RBC #/AREA URNS AUTO: 1 /HPF (ref 0–2)
SODIUM SERPL-SCNC: 135 MMOL/L (ref 133–144)
SOURCE: ABNORMAL
SP GR UR STRIP: 1.01 (ref 1–1.03)
SQUAMOUS #/AREA URNS AUTO: 2 /HPF (ref 0–1)
TRANS CELLS #/AREA URNS HPF: 1 /HPF
UROBILINOGEN UR STRIP-MCNC: 4 MG/DL (ref 0–2)
WBC #/AREA URNS AUTO: 5 /HPF (ref 0–5)

## 2021-02-03 PROCEDURE — 87086 URINE CULTURE/COLONY COUNT: CPT | Performed by: INTERNAL MEDICINE

## 2021-02-03 PROCEDURE — 81001 URINALYSIS AUTO W/SCOPE: CPT | Performed by: INTERNAL MEDICINE

## 2021-02-03 PROCEDURE — 36415 COLL VENOUS BLD VENIPUNCTURE: CPT | Performed by: INTERNAL MEDICINE

## 2021-02-03 PROCEDURE — 80069 RENAL FUNCTION PANEL: CPT | Performed by: INTERNAL MEDICINE

## 2021-02-04 ENCOUNTER — MYC MEDICAL ADVICE (OUTPATIENT)
Dept: UROLOGY | Facility: CLINIC | Age: 71
End: 2021-02-04

## 2021-02-04 ENCOUNTER — MYC MEDICAL ADVICE (OUTPATIENT)
Dept: NEPHROLOGY | Facility: CLINIC | Age: 71
End: 2021-02-04

## 2021-02-04 DIAGNOSIS — N39.0 RECURRENT UTI: Primary | ICD-10-CM

## 2021-02-04 LAB
BACTERIA SPEC CULT: NORMAL
Lab: NORMAL
SPECIMEN SOURCE: NORMAL

## 2021-02-04 RX ORDER — SULFAMETHOXAZOLE/TRIMETHOPRIM 800-160 MG
1 TABLET ORAL 2 TIMES DAILY
Qty: 6 TABLET | Refills: 0 | Status: SHIPPED | OUTPATIENT
Start: 2021-02-04 | End: 2021-02-08

## 2021-02-04 NOTE — TELEPHONE ENCOUNTER
Chart reviewed. 2/3/21 UA results abnormal, urine culture in process.     Called and spoke to patient who is aware of the above information. Patient reports ongoing urinary frequency, urinary urgency, and burning with urination. Informed patient that given her symptoms, an antibiotic could be sent per protocol. Per chart review, patient was treated recently with macrobid. Offered to send bactrim antibiotic for patient while awaiting final urine culture results and patient would like this. Bactrim DS po BID for 3 days ordered per protocol. Prescription sent to [facility] in [place] per patient request. Informed patient that we will watch for the final urine culture results and contact her once available.     Informed patient that we had a cancellation and offered 2/22/21 at 12:45pm for cystoscopy with Dr. Shaw and patient would like this. Appointment rescheduled. Patient was transferred to imaging department and her CT scan has been rescheduled to 2/22/21 at 10:00am.    Iwona Meyer RN, BSN

## 2021-02-04 NOTE — CONFIDENTIAL NOTE
This my chart message was addressed. See 2/4/21 my chart encounter for Dr. Shaw.     Iwona Meyer RN, BSN

## 2021-02-04 NOTE — CONFIDENTIAL NOTE
My chart message regarding UTI questions was address. See 2/4/21 my chart encounter for Dr. Shaw.    Iwona Meyer RN, BSN

## 2021-02-05 ENCOUNTER — MYC MEDICAL ADVICE (OUTPATIENT)
Dept: FAMILY MEDICINE | Facility: OTHER | Age: 71
End: 2021-02-05

## 2021-02-05 DIAGNOSIS — G89.4 CHRONIC PAIN SYNDROME: ICD-10-CM

## 2021-02-05 DIAGNOSIS — M54.16 LUMBAR RADICULOPATHY: Primary | ICD-10-CM

## 2021-02-05 RX ORDER — OXYCODONE AND ACETAMINOPHEN 5; 325 MG/1; MG/1
1 TABLET ORAL EVERY 6 HOURS PRN
Qty: 30 TABLET | Refills: 0 | Status: SHIPPED | OUTPATIENT
Start: 2021-02-05 | End: 2021-02-08

## 2021-02-05 NOTE — CONFIDENTIAL NOTE
Chart reviewed. 2/3/21 urine culture results showed <10,000 colonies/mL of mixed urogenital cher and susceptibility testing note routinely done.    Called and spoke to patient who is aware of the above information. Per patient, her symptoms are about the same but when she has been on the antibiotic in the past, her symptoms improve. Per patient, she would like to continue the antibiotic as prescribed. Encouraged patient to contact the [facility] clinic with any questions or concerns regarding her urinary symptoms in the future. Patient verbalized understanding.    Iwona Meyer RN, BSN

## 2021-02-08 ENCOUNTER — MYC MEDICAL ADVICE (OUTPATIENT)
Dept: FAMILY MEDICINE | Facility: OTHER | Age: 71
End: 2021-02-08

## 2021-02-08 ENCOUNTER — VIRTUAL VISIT (OUTPATIENT)
Dept: NEPHROLOGY | Facility: CLINIC | Age: 71
End: 2021-02-08
Payer: COMMERCIAL

## 2021-02-08 DIAGNOSIS — N18.31 STAGE 3A CHRONIC KIDNEY DISEASE (H): Primary | ICD-10-CM

## 2021-02-08 DIAGNOSIS — N39.0 RECURRENT UTI: ICD-10-CM

## 2021-02-08 DIAGNOSIS — K21.9 GASTROESOPHAGEAL REFLUX DISEASE WITHOUT ESOPHAGITIS: ICD-10-CM

## 2021-02-08 DIAGNOSIS — I95.9 HYPOTENSION, UNSPECIFIED HYPOTENSION TYPE: ICD-10-CM

## 2021-02-08 DIAGNOSIS — D64.9 ANEMIA, UNSPECIFIED TYPE: ICD-10-CM

## 2021-02-08 DIAGNOSIS — G89.4 CHRONIC PAIN SYNDROME: ICD-10-CM

## 2021-02-08 PROCEDURE — 99215 OFFICE O/P EST HI 40 MIN: CPT | Mod: 95 | Performed by: INTERNAL MEDICINE

## 2021-02-08 RX ORDER — VITAMIN B COMPLEX
TABLET ORAL DAILY
COMMUNITY
Start: 2021-02-08 | End: 2021-05-03

## 2021-02-08 NOTE — PROGRESS NOTES
02/08/21     CC: elevated creatinine    HPI: Michaela Dorman is a 70 year old  female who presents for evaluation of elevated creatinine.  Jan 5, 2021:  Ms. Jean has had difficulties with recurrent UTIs- on/off for years.  - referred to urology as well - started on bactrim single strength for prophylaxis in late November. Plan in November was to follow-up with Dr. Shaw in urology again and at that time will undergo cystoscopy as well. On review of her creatinine levels, her true baseline seems to be around 0.8 but with episodes of elevation (up to 1.2 in 2012, 1.45 in May 2019, more recently sitting 1.1-1.2 this year). On review of UAs, she has had hematuria; UMaCR typically normal but 40 mg/g in Dec 2020.    Today she reports that she has stopped the bactrim due to ongoing infections despite being on it; off for one week. She is now on ciprofloxacin; also recent difficulties with yeast infections. BP most recently has been low. She feels weak/dizzy with these low pressures - by afternoon is able to walk 3 miles. She is off of hydrochlorothiazide now; 162/75 yesterday later in the day so did take a tab. She is eating/drinking well. No fever/chills. She feels the UTI is gone at this time. No diarrhea. No irregular heart rate appreciated. She reports low blood pressures for years on/off - now 3-4 months with very low pressures.    She is taking celebrex for back pain - had an extensive back surgery in early 2020. Currently taking celebrex once a day. Prior to 2020 was not taking NSAIDs. On prevacid for heart burn - has been on this for long time - no heart burn sxs for a long time. No hx of ulcers/esophagitis. Takes miralax daily. Takes magnesium daily. Addt hx breast cancer with mastectomy/chemo/rx.   - History of Hematuria: no  - Swelling: no  - Hx of UTIs: yes - see above  - Hx of stones: no  - Rashes/Joint pain: no rash; back pain present - hx of surgery  - Family hx of kidney disease: no  - NSAID use: no  other NSAIDs; just celebrex    02/08/21: video visit. At the time of her initial visit, I recommended stopping celebrex, trial stopping prevacid, staying off of hydrochlorothiazide. Creatinine was 1.2 just after that visit, improved to 0.96 on 1/21 and is now 1.12 most recently on 2/3/21. She unfortunately has had ongoing difficulties with urinary frequency, urgency, and dysuria. Bactrim was sent in 2/4/21. She has a cystoscopy planned with  on 2/22/21 with a CT at that time as well. Her cultures have only shown mixed urogenital cher most recently.    Today she reports that she remains on celebrex - was on BID and now daily. She is following with Phani Tapia PA-C for her pain. BP has been good - at least 90/60, 100/62. It is this low despite being off of all BP meds. Heart rate has been 95. She is walking and exercising quite a bit: walking 3 miles per day and elliptical. MWF she also does weights. She feels she is drinking a lot of water/staying hydrated. Early AM dizziness. She tried stopping prevacid after last visit but she had heart burn sxs return - pepcid did not give the relief.        Allergies   Allergen Reactions     No Known Drug Allergies             acetaminophen (TYLENOL) 500 MG tablet, Take 1,000 mg by mouth 3 times daily as needed for mild pain (500MG X 2 = 1,000MG)       ALPRAZolam (XANAX) 0.5 MG tablet, TAKE 1 TABLET BY MOUTH DAILY AT BEDTIME AS NEEDED FOR SLEEP       aspirin (ASA) 81 MG chewable tablet, Take 1 tablet (81 mg) by mouth daily       atorvastatin (LIPITOR) 20 MG tablet, TAKE ONE TABLET BY MOUTH ONCE DAILY       B-12 METHYLCOBALAMIN PO, Take 5,000 mcg by mouth every morning       escitalopram (LEXAPRO) 10 MG tablet, TAKE 1 TABLET BY MOUTH EVERY DAY WITH 20MG TABLETS       escitalopram (LEXAPRO) 20 MG tablet, TAKE 1 TABLET BY MOUTH EVERY DAY WITH 20MG TABLETS       Garlic 1000 MG CAPS, Take 1,000 mg by mouth every morning       Magnesium Oxide 500 MG TABS, Take 500 mg by  mouth every morning       oxyCODONE-acetaminophen (PERCOCET) 5-325 MG tablet, Take 1 tablet by mouth every 6 hours as needed for pain       polyethylene glycol (MIRALAX/GLYCOLAX) powder, Take 1 capful by mouth every morning        prochlorperazine (COMPAZINE) 10 MG tablet, TAKE 1 TABLET BY MOUTH EVERY 6 HOURS AS NEEDED FOR NAUSEA AND VOMITING       Psyllium (METAMUCIL FIBER PO), Take 2 teaspoonful by mouth every morning (mix in with liquid and drink)       senna-docusate (SENOKOT-S/PERICOLACE) 8.6-50 MG tablet, Take 2 tablets by mouth 2 times daily (Patient taking differently: Take 2 tablets by mouth 2 times daily as needed for constipation )       vitamin C (ASCORBIC ACID) 1000 MG TABS, Take 1,000 mg by mouth every morning       Vitamin D3 (CHOLECALCIFEROL) 25 mcg (1000 units) tablet, Take by mouth daily       Zinc 30 MG CAPS, Take 30 mg by mouth every morning        COMPOUNDED NON-CONTROLLED SUBSTANCE (CMPD RX) - PHARMACY TO MIX COMPOUNDED MEDICATION, Estriol 0.1% cream (1mg/gram) in HRT base. Place 1 gram vaginally daily for 2 weeks, then vaginally twice weekly. (Patient not taking: Reported on 2/8/2021)       diphenhydrAMINE (BENADRYL) 25 MG tablet, Take 25 mg by mouth nightly as needed for itching or allergies    No current facility-administered medications on file prior to visit.       Past Medical History:   Diagnosis Date     Carotid stenosis, bilateral L80%, R40% 09/02/2020     Central stenosis of spinal canal 12/01/2017    lumbar      DDD (degenerative disc disease), lumbar 12/01/2017     Depressive disorder, not elsewhere classified      Esophageal reflux      ETOH abuse     in remission     Foraminal stenosis of lumbar region 12/01/2017    Complete lumbar spine left at various degrees and to a lesser extent the right as well.     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     Subdural hematoma (H)        Past Surgical History:   Procedure Laterality Date     ARTHROPLASTY SHOULDER Right 11/17/2015     ARTHROSCOPY KNEE  6/7/2012    Procedure:ARTHROSCOPY KNEE; right knee arthroscopy with partial lateral menisectomy; Surgeon:DAMIÁN MCALLISTER; Location:[facility]     C LIGATE FALLOPIAN TUBE       COLONOSCOPY N/A 12/22/2017    Procedure: COLONOSCOPY;  colonoscopy;  Surgeon: Chuck Chand MD;  Location: [facility]     ENDARTERECTOMY CAROTID Left 10/8/2020    Procedure: LEFT CAROTID ENDARTERECTOMY WITH EEG;  Surgeon: Lacho Jasmine MD;  Location: [facility]     EXCISE MASS AXILLA Left 9/16/2016    Procedure: EXCISE MASS AXILLA;  Surgeon: Keyon Blanco MD;  Location: [facility]     HC REMV CATARACT EXTRACAP,INSERT LENS, W/O ECP  6/25/2009    Right eye     INJECT EPIDURAL LUMBAR N/A 4/11/2019    Procedure: interlaminar epidual steroid injection lumbar 4-5;  Surgeon: Oskar Salvador MD;  Location: [facility]     INJECT EPIDURAL LUMBAR Bilateral 9/12/2019    Procedure: lumbar 4-5 epidural interlaminar steroid injection;  Surgeon: Oskar Salvador MD;  Location: [facility]     INSERT PORT VASCULAR ACCESS Right 10/7/2016    Procedure: INSERT PORT VASCULAR ACCESS;  Surgeon: Keyon Blanco MD;  [facility]: [facility]     MASTECTOMY SIMPLE BILATERAL Bilateral 2/8/2017    Procedure: MASTECTOMY SIMPLE BILATERAL;  Surgeon: Keyon Blanco MD;  Location: [facility]     OPEN REDUCTION INTERNAL FIXATION FOOT Right 3/20/2015    Procedure: OPEN REDUCTION INTERNAL FIXATION FOOT;  Surgeon: Javi Herrmann DPM;  Location: [facility]     OPTICAL TRACKING SYSTEM FUSION SPINE POSTERIOR LUMBAR TWO LEVELS N/A 2/4/2020    Procedure: LUMBAR 2-SACRAL1 TRANSFORMINAL INTERBODY FUSION;  Surgeon: Lenny Gomes MD;  Location: [facility]     REMOVE PORT VASCULAR ACCESS Right 2/14/2018    Procedure: REMOVE PORT VASCULAR ACCESS;  right intra jugular port removal;  Surgeon:  Keyon Balnco MD;  Location: [facility]     SHOULDER SURGERY Right 2015     ZZC NONSPECIFIC PROCEDURE      ankle fracture surgery       Social History     Tobacco Use     Smoking status: Former Smoker     Packs/day: 3.00     Years: 10.00     Pack years: 30.00     Types: Cigarettes     Quit date: 1979     Years since quittin.2     Smokeless tobacco: Never Used     Tobacco comment: approx. 3 packs daily   Substance Use Topics     Alcohol use: Yes     Alcohol/week: 0.0 standard drinks     Comment: daily glass of wine     Drug use: No       Family History   Problem Relation Age of Onset     Hypertension Mother      Thyroid Disease Mother      Cerebrovascular Disease Mother      Hypertension Father      Cerebrovascular Disease Father      Cancer Father         skin     Thyroid Disease Sister      Diabetes Brother         type 2     Depression Sister      Breast Cancer Sister         age 47     Cancer Sister         skin     Cancer Brother         inside nose     Exam:  There were no vitals taken for this visit.  GENERAL: Healthy, alert and no distress      Results:    No visits with results within 1 Day(s) from this visit.   Latest known visit with results is:   Orders Only on 2021   Component Date Value Ref Range Status     Color Urine 2021 Natasha   Final     Appearance Urine 2021 Clear   Final     Glucose Urine 2021 Negative  NEG^Negative mg/dL Final     Bilirubin Urine 2021 Negative  NEG^Negative Final     Ketones Urine 2021 Negative  NEG^Negative mg/dL Final     Specific Gravity Urine 2021 1.006  1.003 - 1.035 Final     Blood Urine 2021 Negative  NEG^Negative Final     pH Urine 2021 5.0  5.0 - 7.0 pH Final     Protein Albumin Urine 2021 Negative  NEG^Negative mg/dL Final     Urobilinogen mg/dL 2021 4.0* 0.0 - 2.0 mg/dL Final     Nitrite Urine 2021 Positive* NEG^Negative Final     Leukocyte Esterase Urine 2021 Negative   NEG^Negative Final     Source 02/03/2021 Unspecified Urine   Final     RBC Urine 02/03/2021 1  0 - 2 /HPF Final     WBC Urine 02/03/2021 5  0 - 5 /HPF Final     Bacteria Urine 02/03/2021 Few* NEG^Negative /HPF Final     Squamous Epithelial /HPF Urine 02/03/2021 2* 0 - 1 /HPF Final     Transitional Epi 02/03/2021 1* FEW^Few /HPF Final     Sodium 02/03/2021 135  133 - 144 mmol/L Final     Potassium 02/03/2021 4.2  3.4 - 5.3 mmol/L Final     Chloride 02/03/2021 105  94 - 109 mmol/L Final     Carbon Dioxide 02/03/2021 26  20 - 32 mmol/L Final     Anion Gap 02/03/2021 4  3 - 14 mmol/L Final     Glucose 02/03/2021 83  70 - 99 mg/dL Final     Urea Nitrogen 02/03/2021 26  7 - 30 mg/dL Final     Creatinine 02/03/2021 1.12* 0.52 - 1.04 mg/dL Final     GFR Estimate 02/03/2021 49* >60 mL/min/[1.73_m2] Final    Comment: Non  GFR Calc  Starting 12/18/2018, serum creatinine based estimated GFR (eGFR) will be   calculated using the Chronic Kidney Disease Epidemiology Collaboration   (CKD-EPI) equation.       GFR Estimate If Black 02/03/2021 57* >60 mL/min/[1.73_m2] Final    Comment:  GFR Calc  Starting 12/18/2018, serum creatinine based estimated GFR (eGFR) will be   calculated using the Chronic Kidney Disease Epidemiology Collaboration   (CKD-EPI) equation.       Calcium 02/03/2021 9.3  8.5 - 10.1 mg/dL Final     Phosphorus 02/03/2021 3.3  2.5 - 4.5 mg/dL Final     Albumin 02/03/2021 4.1  3.4 - 5.0 g/dL Final     Specimen Description 02/03/2021 Unspecified Urine   Final     Special Requests 02/03/2021 Specimen received in preservative   Final     Culture Micro 02/03/2021    Final                    Value:<10,000 colonies/mL  mixed urogenital cher  Susceptibility testing not routinely done          Assessment/Plan:   1. CKD STage 3: at risk for CKD in the setting of hypotension, NSAID use, PPI use, and recurrent UTIs.  UMACR normal in January so reassuring that there is unlikely an active GN.   -  urology follow-up to optimize urinary complaints  - would like to see her away from all celebrex given risk of NSAIDs in CKD  - Trialing H2 blocker in place of PPI therapy.   - Stay off anti-hypertensives to avoid prerenal state/hypotension.   Plan for 6 month follow-up at this time.     2. Recurrent UTIs: has plans for CT and cystoscopy by Dr. Shaw later this month.     3. Hypotension: unclear cause of her low pressures at home recently. Will have her bring her home cuff with her to her visit on 2/22/21 when she is in clinic to assure accuracy of home cuff. BP was recently 123 in clinic so this seems reassuring. If true hypotension, would want to consider echocardiogram. Off all anti-hypertensives at this time.     4. GERD: louis ESTEVEZ sxs so has been holding her iron and other vitamins. She is also now off of antibiotics. She is going to trial pepcid to see if this brings her relief. If not, she will then go back on prevacid.     5. Low back pain: educated to work away from celebrex as able. Ok to use tylenol in its place.     6. Anemia: iron saturation was at 29%. Given GI irritability, ok with her taking the iron every other day. Hemoglobin is 11.1.     Patient Instructions   1. When you come for your visit on 2/22, please bring your home cuff to clinic so it can be compared to the clinic cuff.   2. You do not need the 5000 units of vitamin D (cholecalciferol) a day. I recommend that if you choose to take a vitamin D supplement, not to take more than 1000 units a day.   3. Given recent iron levels, can take iron every other day.   4. Continue to work with Phani Taipa with hopes of getting away from celebrex.   5. Tentative plan to follow-up in 6 months.        959 AM to 1028 AM - video   46 total minutes spent in chart: lab entry, chart review, documentation, visit time  Akiko Smith DO

## 2021-02-08 NOTE — NURSING NOTE
Attempted to contact pt.  No answer.  Message left to return call.    Calling to assist pt with scheduling Future appt recommended by Dr. Smith:      Instructions:  1. When you come for your visit on 2/22, please bring your home cuff to clinic so it can be compared to the clinic cuff.   2. You do not need the 5000 units of vitamin D (cholecalciferol) a day. I recommend that if you choose to take a vitamin D supplement, not to take more than 1000 units a day.   3. Given recent iron levels, can take iron every other day.   4. Continue to work with Phani Tapia with hopes of getting away from celebrex.   5. Tentative plan to follow-up in 6 months.      Danika Scott LPN

## 2021-02-08 NOTE — PATIENT INSTRUCTIONS
1. When you come for your visit on 2/22, please bring your home cuff to clinic so it can be compared to the clinic cuff.   2. You do not need the 5000 units of vitamin D (cholecalciferol) a day. I recommend that if you choose to take a vitamin D supplement, not to take more than 1000 units a day.   3. Given recent iron levels, can take iron every other day.   4. Continue to work with Phani Tapia with hopes of getting away from celebrex.   5. Tentative plan to follow-up in 6 months.

## 2021-02-08 NOTE — PROGRESS NOTES
Michaela is a 70 year old who is being evaluated via a billable video visit.      How would you like to obtain your AVS? MyChart  If the video visit is dropped, the invitation should be resent by: Text to cell phone: [phone]  Will anyone else be joining your video visit? Bambi Scott LPN

## 2021-02-10 NOTE — NURSING NOTE
Called and spoke with pt.  assisted with scheduling Future appts recommended by Dr. Smith.    Instructions:  1. When you come for your visit on 2/22, please bring your home cuff to clinic so it can be compared to the clinic cuff.   2. You do not need the 5000 units of vitamin D (cholecalciferol) a day. I recommend that if you choose to take a vitamin D supplement, not to take more than 1000 units a day.   3. Given recent iron levels, can take iron every other day.   4. Continue to work with Phani Tapia with hopes of getting away from celebrex.   5. Tentative plan to follow-up in 6 months.      Encouraged pt to call if any further questions or concerns.    Danika Scott LPN

## 2021-02-22 ENCOUNTER — MYC MEDICAL ADVICE (OUTPATIENT)
Dept: UROLOGY | Facility: CLINIC | Age: 71
End: 2021-02-22

## 2021-02-22 ENCOUNTER — MYC MEDICAL ADVICE (OUTPATIENT)
Dept: FAMILY MEDICINE | Facility: OTHER | Age: 71
End: 2021-02-22

## 2021-02-22 ENCOUNTER — MEDICAL CORRESPONDENCE (OUTPATIENT)
Dept: HEALTH INFORMATION MANAGEMENT | Facility: CLINIC | Age: 71
End: 2021-02-22

## 2021-02-22 ENCOUNTER — OFFICE VISIT (OUTPATIENT)
Dept: UROLOGY | Facility: CLINIC | Age: 71
End: 2021-02-22
Payer: COMMERCIAL

## 2021-02-22 ENCOUNTER — ANCILLARY PROCEDURE (OUTPATIENT)
Dept: CT IMAGING | Facility: CLINIC | Age: 71
End: 2021-02-22
Attending: UROLOGY
Payer: COMMERCIAL

## 2021-02-22 DIAGNOSIS — N39.0 RECURRENT UTI: Primary | ICD-10-CM

## 2021-02-22 DIAGNOSIS — N39.0 RECURRENT UTI: ICD-10-CM

## 2021-02-22 LAB
ALBUMIN UR-MCNC: NEGATIVE MG/DL
APPEARANCE UR: CLEAR
BILIRUB UR QL STRIP: NEGATIVE
COLOR UR AUTO: YELLOW
GLUCOSE UR STRIP-MCNC: NEGATIVE MG/DL
HGB UR QL STRIP: NEGATIVE
KETONES UR STRIP-MCNC: NEGATIVE MG/DL
LEUKOCYTE ESTERASE UR QL STRIP: ABNORMAL
NITRATE UR QL: NEGATIVE
NON-SQ EPI CELLS #/AREA URNS LPF: ABNORMAL /LPF
PH UR STRIP: 5.5 PH (ref 5–7)
RBC #/AREA URNS AUTO: ABNORMAL /HPF
SOURCE: ABNORMAL
SP GR UR STRIP: 1.02 (ref 1–1.03)
UROBILINOGEN UR STRIP-MCNC: NORMAL MG/DL (ref 0–2)
WBC #/AREA URNS AUTO: ABNORMAL /HPF

## 2021-02-22 PROCEDURE — 88305 TISSUE EXAM BY PATHOLOGIST: CPT | Performed by: PATHOLOGY

## 2021-02-22 PROCEDURE — 74176 CT ABD & PELVIS W/O CONTRAST: CPT | Mod: TC | Performed by: RADIOLOGY

## 2021-02-22 PROCEDURE — 81001 URINALYSIS AUTO W/SCOPE: CPT | Performed by: UROLOGY

## 2021-02-22 PROCEDURE — 52000 CYSTOURETHROSCOPY: CPT | Performed by: UROLOGY

## 2021-02-22 PROCEDURE — 88112 CYTOPATH CELL ENHANCE TECH: CPT | Performed by: PATHOLOGY

## 2021-02-22 RX ORDER — CIPROFLOXACIN 500 MG/1
500 TABLET, FILM COATED ORAL ONCE
Status: COMPLETED | OUTPATIENT
Start: 2021-02-22 | End: 2021-02-22

## 2021-02-22 RX ADMIN — CIPROFLOXACIN 500 MG: 500 TABLET, FILM COATED ORAL at 12:35

## 2021-02-22 NOTE — PROGRESS NOTES
Reason for Visit:  Cystoscopy    Clinical Data: Ms. Michaela Dorman is a 70 year old female with a hx of recurrent uti's.  She was started on Bactrim SS for prophylaxis and estrogen cream.  She did not tolerate well the estrogen and the bactrim has not stopped her from getting infections.  The UA's were all reviewed as well the UCx.  The former is consistently positive but the culture don't show significant growth but she is symptomatic and she does respond to antibiotics.    CT 11/30/20:  IMPRESSION: No urinary tract calculi or hydronephrosis.    Cystoscopy procedure:  Pt. Was consented and placed in the lithotomy position.  She was cleaned and preparred in the usual fashion.  Lidocain gel was inserted into the urethra and given time to take effect.  A 16 fr flexible cystoscope was then inserted through the urethra and into the bladder.  The urethra was wnl.  The bladder was with 1+ trabeculation.  No tumors, diverticulae, or stones.  However she had some debris and small papilla at the dome.  More consistent with inflammation as the bladder was friable but a representative sample was taken from the dome area.  Bilateral u/o's were effluxing clear urine.  The cystoscope was then withdrawn.  The pt. Tolerated the procedure well.    A/P:  70 year old female with recurrent uti's with inflammatory changes on cystoscopy  -send urine for cytology  -send urine for Decodex  -f/u in 3 weeks for video visit to review biopsy results and microgenex.    Thank you for allowing me to participate in the care of  Ms. Michaela Dorman and I will keep you updated on her progress.    Facundo Shaw MD

## 2021-02-22 NOTE — PATIENT INSTRUCTIONS
"After Your Cystoscopy    What happens after the exam?    You may go back to your normal diet and activity as you feel ready, unless your doctor tells you not to.    For the next two days, you may notice:    Some blood in your urine  Some burning when you urinate (use the toilet)  An urge to urinate more often  Bladder spasms    These are normal after the procedure and should go away after a day or two.  To relieve these problems drink 6 to 8 large glasses of water each day (includes drinks at meals) as this will help clear the urine.  Take warm baths to relieve pain and bladder spasms.  Do not add anything to the bath water.  You may also take Tylenol (acetaminophen) for pain if needed.    When should I call my doctor?    A fever over 100F (38C) for more than a day. (Before you call the doctor, check your temperature under your tongue)  Chills  Failure to urinate: No urine comes out when you try to use the toilet. (Try soaking in a bathtub full of warm water. If still no urine, call your doctor)  A lot of blood in the urine, or blood clots larger than a nickel  Pain in the back or belly area (abdomen)  Pain or spasms that are not relieved by warm tub baths and pain medicine  Severe pain, burning or other problems while passing urine  Pain that gets worse after two days        -send urine for cytology  -send urine for Decodex  -f/u in 3 weeks for video visit to review biopsy results and microgenex.      AFTER YOUR CYSTOSCOPY        You have just completed a cystoscopy, or \"cysto\", which allowed your physician to learn more about your bladder (or to remove a stent placed after surgery). We suggest that you continue to avoid caffeine, fruit juice, and alcohol for the next 24 hours, however, you are encouraged to return to your normal activities.         A few things that are considered normal after your cystoscopy:     * Small amount of bleeding (or spotting) that clears within the next 24 hours     * Slight burning " "sensation with urination     * Sensation to of needing to avoid more frequently     * The feeling of \"air\" in your urine     * Mild discomfort that is relieved with Tylenol        Please contact our office promptly if you:     * Develop a fever above 101 degrees     * Are unable to urinate     * Develop bright red blood that does not stop     * Severe pain or swelling         Please contact our office with any concerns or questions @[facility].  "

## 2021-02-22 NOTE — NURSING NOTE
Michaela Dorman's goals for this visit include:   Chief Complaint   Patient presents with     Cystoscopy     Recurrent uti       She requests these members of her care team be copied on today's visit information:     PCP: Phani Tapia    Referring Provider:  No referring provider defined for this encounter.    There were no vitals taken for this visit.    Do you need any medication refills at today's visit? No    Geneva Campos LPN    
weight-bearing as tolerated

## 2021-02-23 LAB — COPATH REPORT: NORMAL

## 2021-02-24 ENCOUNTER — TRANSFERRED RECORDS (OUTPATIENT)
Dept: HEALTH INFORMATION MANAGEMENT | Facility: CLINIC | Age: 71
End: 2021-02-24

## 2021-02-25 ENCOUNTER — MYC MEDICAL ADVICE (OUTPATIENT)
Dept: UROLOGY | Facility: CLINIC | Age: 71
End: 2021-02-25

## 2021-02-26 ENCOUNTER — MYC MEDICAL ADVICE (OUTPATIENT)
Dept: UROLOGY | Facility: CLINIC | Age: 71
End: 2021-02-26

## 2021-02-26 DIAGNOSIS — N39.0 URINARY TRACT INFECTION: ICD-10-CM

## 2021-02-26 DIAGNOSIS — N39.0 RECURRENT UTI: Primary | ICD-10-CM

## 2021-02-26 DIAGNOSIS — N39.0 URINARY TRACT INFECTION: Primary | ICD-10-CM

## 2021-02-26 LAB
ALBUMIN UR-MCNC: NEGATIVE MG/DL
APPEARANCE UR: CLEAR
BILIRUB UR QL STRIP: NEGATIVE
COLOR UR AUTO: ABNORMAL
COPATH REPORT: NORMAL
GLUCOSE UR STRIP-MCNC: NEGATIVE MG/DL
HGB UR QL STRIP: NEGATIVE
KETONES UR STRIP-MCNC: NEGATIVE MG/DL
LAB SCANNED RESULT: ABNORMAL
LEUKOCYTE ESTERASE UR QL STRIP: NEGATIVE
MISCELLANEOUS TEST: NORMAL
MUCOUS THREADS #/AREA URNS LPF: PRESENT /LPF
NITRATE UR QL: POSITIVE
PH UR STRIP: 5 PH (ref 5–7)
RBC #/AREA URNS AUTO: 7 /HPF (ref 0–2)
SOURCE: ABNORMAL
SP GR UR STRIP: 1.02 (ref 1–1.03)
SQUAMOUS #/AREA URNS AUTO: 1 /HPF (ref 0–1)
TRANS CELLS #/AREA URNS HPF: 2 /HPF
UROBILINOGEN UR STRIP-MCNC: 4 MG/DL (ref 0–2)
WBC #/AREA URNS AUTO: 69 /HPF (ref 0–5)

## 2021-02-26 PROCEDURE — 87086 URINE CULTURE/COLONY COUNT: CPT | Performed by: UROLOGY

## 2021-02-26 PROCEDURE — 81001 URINALYSIS AUTO W/SCOPE: CPT | Performed by: UROLOGY

## 2021-02-26 RX ORDER — NITROFURANTOIN 25; 75 MG/1; MG/1
100 CAPSULE ORAL 2 TIMES DAILY
Qty: 10 CAPSULE | Refills: 0 | Status: SHIPPED | OUTPATIENT
Start: 2021-02-26 | End: 2021-03-08

## 2021-02-26 NOTE — CONFIDENTIAL NOTE
Chart reviewed. 2/26/21 UA results abnormal, urine culture in process. Called and spoke to patient who is aware of these results. Informed patient that because of her symptoms and the abnormal UA results, an antibiotic could be started now and patient would like this. macrobid 100mg po BID for 5 days ordered per protocol. Prescription sent to [facility] in [place] per patient request. Patient aware to stop the nightly keflex while taking the macrobid and resume nightly keflex when macrobid is complete. Informed patient that we will watch for the final urine culture results and contact her once available. Patient was comfortable with plan.    Iwona Meyer RN, BSN

## 2021-02-27 ENCOUNTER — MYC MEDICAL ADVICE (OUTPATIENT)
Dept: UROLOGY | Facility: CLINIC | Age: 71
End: 2021-02-27

## 2021-02-27 LAB
BACTERIA SPEC CULT: NO GROWTH
Lab: NORMAL
SPECIMEN SOURCE: NORMAL

## 2021-03-01 ENCOUNTER — MYC MEDICAL ADVICE (OUTPATIENT)
Dept: UROLOGY | Facility: CLINIC | Age: 71
End: 2021-03-01

## 2021-03-01 DIAGNOSIS — F43.21 ADJUSTMENT DISORDER WITH DEPRESSED MOOD: ICD-10-CM

## 2021-03-01 DIAGNOSIS — F41.9 ANXIETY: ICD-10-CM

## 2021-03-01 RX ORDER — ALPRAZOLAM 0.5 MG
TABLET ORAL
Qty: 30 TABLET | Refills: 0 | Status: SHIPPED | OUTPATIENT
Start: 2021-03-01 | End: 2021-04-15

## 2021-03-01 NOTE — CONFIDENTIAL NOTE
Attempted to reach patient by phone, but no answer and line was busy. My chart message sent to patient.    Iwona Meyer RN, BSN

## 2021-03-01 NOTE — CONFIDENTIAL NOTE
Discussed question regarding bladder cancer with Dr. Jack and okay for patient to get covid vaccine.    Called and spoke to patient who is aware of the above information. Patient verbalized understanding.    Iwona Meyer RN, BSN

## 2021-03-01 NOTE — CONFIDENTIAL NOTE
Chart reviewed. 2/26/21 urine culture showed no growth. Per Dr. Shaw, patient to be treated for UTI per protocol. macrobid started on 2/26/21 and can be continued if patient has noticed improvement in symptoms while taking macrobid.    Called and spoke to patient who is aware of the above information. Patient verbalized understanding and reports that her symptom are improving with the macrobid.     Informed patient of the 2/22/21 urine cytology and pathology results and result note from Dr. Shaw. Patient verbalized understanding. Telephone visit with Dr. Jack scheduled for tomorrow 3/2/21 at 1:30pm.     Patient reports that she is scheduled for her first covid vaccine today and is wondering if it is okay to proceed with vaccine as scheduled given this new diagnosis. Informed patient that writer will look into this and contact her as soon as possible.    Iwona Meyer RN, BSN

## 2021-03-02 ENCOUNTER — MYC MEDICAL ADVICE (OUTPATIENT)
Dept: UROLOGY | Facility: CLINIC | Age: 71
End: 2021-03-02

## 2021-03-02 ENCOUNTER — VIRTUAL VISIT (OUTPATIENT)
Dept: UROLOGY | Facility: CLINIC | Age: 71
End: 2021-03-02
Payer: COMMERCIAL

## 2021-03-02 DIAGNOSIS — N39.0 URINARY TRACT INFECTION: Primary | ICD-10-CM

## 2021-03-02 DIAGNOSIS — Z85.51 PERSONAL HISTORY OF MALIGNANT NEOPLASM OF BLADDER: Primary | ICD-10-CM

## 2021-03-02 PROCEDURE — 99214 OFFICE O/P EST MOD 30 MIN: CPT | Mod: 95 | Performed by: UROLOGY

## 2021-03-02 NOTE — PROGRESS NOTES
[facility]  CHIEF COMPLAINT   It was my pleasure to see Michaela Dorman who is a 70 year old female for follow-up of newly diagnosed bladder cancer.      HPI   Michaela Dorman is a very pleasant 70 year old female.  She previously followed with Dr. Shaw for symptoms of recurrent UTIs, though majority of her urine cultures were negative.  She underwent cystoscopy on 2/22/2021 which was notable for a small papilla at the dome which appeared more consistent with inflammation but a biopsy sample was taken from the dome area.  Pathology from this came back with high-grade urothelial carcinoma and her urine cytology from the day was also suspicious for high-grade urothelial carcinoma.    TODAY:  Referred to me for further discussion and management of her new diagnosis of high-grade urothelial carcinoma  She continues to have fairly constant dysuria    Quit smoking 40 yeas ago  Smoked from age [age] - with a ~20pack/year history     PHYSICAL EXAM  Patient is a 70 year old  female   Vitals: not currently breastfeeding.  There is no height or weight on file to calculate BMI.  General: No evidence of distress   Lungs: normal respiratory effort  Neuro: Alert, oriented, speech and mentation normal  Psych: affect and mood normal      Creatinine   Date Value Ref Range Status   02/03/2021 1.12 (H) 0.52 - 1.04 mg/dL Final   01/21/2021 0.96 0.52 - 1.04 mg/dL Final   01/12/2021 1.20 (H) 0.52 - 1.04 mg/dL Final   12/24/2020 1.20 (H) 0.52 - 1.04 mg/dL Final   12/11/2020 1.19 (H) 0.52 - 1.04 mg/dL Final         CYTOLOGIC INTERPRETATION:    Urine, voided:   - Suspicious for high-grade urothelial carcinoma       PATHOLOGY  Bladder Biopsy 2/22/21:  FINAL DIAGNOSIS:   Bladder biopsy, dome:   - Superficial fragments of high grade urothelial carcinoma   - No lamina propria or muscularis propria identified     IMAGING:  All pertinent imaging reviewed:    All imaging studies reviewed by me.  I personally reviewed these imaging films.  A  formal report from radiology will follow.    CT ABD/PEL:  FINDINGS:   LOWER CHEST: Normal.     HEPATOBILIARY: Normal.     PANCREAS: Normal.     SPLEEN: Normal.     ADRENAL GLANDS: Normal.     KIDNEYS/BLADDER: No renal stones or hydronephrosis. The ureters are  not dilated. Urinary bladder is unremarkable.     BOWEL: Moderate volume of retained colonic stool. There are a few  scattered colonic diverticula but no evidence of diverticulitis. The  appendix is normal.     LYMPH NODES: Normal.     VASCULATURE: Significant aortic atherosclerosis, particularly at the  origins of the visceral branches. Atherosclerosis also extends into  the peripheral arterial vasculature.     PELVIC ORGANS: Unremarkable.     OTHER: No ascites.     MUSCULOSKELETAL: Multilevel lumbar fusion hardware. No destructive  bone lesions.                                                                   IMPRESSION: No urinary tract calculi or hydronephrosis.    ASSESSMENT and PLAN  70-year-old female with about 1 year history of frequent dysuria and new diagnosis of high-grade urothelial carcinoma    High-grade urothelial carcinoma  -I reviewed her labs, pathology report, CT images, and notes from Dr. Shaw  -We discussed the important factors in the diagnosis and management of bladder cancer being the grade and the stage.  We discussed that from the biopsy and the cytology this is consistent with a high-grade urothelial carcinoma.  We discussed that based on the biopsy results we cannot accurately stage this malignancy at this time  -We discussed the need for an operative TURBT.  We also discussed that bilateral retrograde pyelograms would be performed at this time given that her recent CT scan was a noncontrast CT as part of her work-up for her recurrent UTIs.  -We will plan for surgery at the [facility]    Chart documentation with Dragon Voice recognition Software. Although reviewed after completion, some words and grammatical errors may  remain.    Time spent: 20 minutes spent on the date of the encounter doing chart review, history and exam, documentation and further activities as noted above.  Phone call duration: 11 minutes    Girish Jack MD   Urology  [facility]  [facility] Phone: [phone]  [facility] Phone: [phone]        Michaela is a 70 year old who is being evaluated via a billable telephone visit.      What phone number would you like to be contacted at? [phone]  How would you like to obtain your AVS? MyChart

## 2021-03-02 NOTE — CONFIDENTIAL NOTE
Received [facility] Dx level 1 PCR report and level 2 PCR report which showed low bacterial load of escherichia coli and enterococcus faecalis which are sensitive to fosfomycin, macrobid, and augmentin. Patient being treated with macrobid 100mg po BID for 5 days. My chart message sent to patient.    Iwona Meyer RN, BSN

## 2021-03-03 ENCOUNTER — MYC MEDICAL ADVICE (OUTPATIENT)
Dept: UROLOGY | Facility: CLINIC | Age: 71
End: 2021-03-03

## 2021-03-03 NOTE — TELEPHONE ENCOUNTER
Patient sent mychart reporting lingering UTI symptoms. UA was ordered and patient will leave a sample at the [facility] during her visit with her PCP.    Geneva Campos LPN

## 2021-03-04 ENCOUNTER — MYC MEDICAL ADVICE (OUTPATIENT)
Dept: FAMILY MEDICINE | Facility: OTHER | Age: 71
End: 2021-03-04

## 2021-03-04 DIAGNOSIS — Z11.59 ENCOUNTER FOR SCREENING FOR OTHER VIRAL DISEASES: ICD-10-CM

## 2021-03-04 PROBLEM — Z85.51 PERSONAL HISTORY OF MALIGNANT NEOPLASM OF BLADDER: Status: ACTIVE | Noted: 2021-03-04

## 2021-03-04 NOTE — H&P (VIEW-ONLY)
[address]  [facility] [address]  Phone: [phone]  Primary Provider: Phani Tapia  Pre-op Performing Provider: PHANI TAPIA      PREOPERATIVE EVALUATION:  Today's date: 3/8/2021    Michaela Dorman is a 70 year old female who presents for a preoperative evaluation.    Surgical Information:  Surgery/Procedure: Cystoscopy   Surgery Location: [facility]   Surgeon: Guero  Surgery Date: 03/18  Time of Surgery: 12  Where patient plans to recover: At home with family  Fax number for surgical facility: Note does not need to be faxed, will be available electronically in Epic.    Type of Anesthesia Anticipated: Choice    Assessment & Plan     The proposed surgical procedure is considered INTERMEDIATE risk.    Acute cystitis with hematuria  Malignant neoplasm of urinary bladder, unspecified site (H)  Approved for surgical intervention.           Risks and Recommendations:  The patient has the following additional risks and recommendations for perioperative complications:   - No identified additional risk factors other than previously addressed    Medication Instructions:  Patient is to take all scheduled medications on the day of surgery   - aspirin: Discontinue aspirin 7-10 days prior to procedure to reduce bleeding risk. It should be resumed postoperatively.     RECOMMENDATION:  APPROVAL GIVEN to proceed with proposed procedure, without further diagnostic evaluation.        Subjective     HPI related to upcoming procedure: bladder Cancer evaluation      Preop Questions 3/8/2021   1. Have you ever had a heart attack or stroke? No   2. Have you ever had surgery on your heart or blood vessels, such as a stent placement, a coronary artery bypass, or surgery on an artery in your head, neck, heart, or legs? YES -      3. Do you have chest pain with activity? No   4. Do you have a history of  heart failure? No   5. Do you currently have a cold, bronchitis or  symptoms of other infection? No   6. Do you have a cough, shortness of breath, or wheezing? No   7. Do you or anyone in your family have previous history of blood clots? No   8. Do you or does anyone in your family have a serious bleeding problem such as prolonged bleeding following surgeries or cuts? No   9. Have you ever had problems with anemia or been told to take iron pills? YES -    10. Have you had any abnormal blood loss such as black, tarry or bloody stools, or abnormal vaginal bleeding? No   11. Have you ever had a blood transfusion? YES -    11a. Have you ever had a transfusion reaction? No   12. Are you willing to have a blood transfusion if it is medically needed before, during, or after your surgery? Yes   13. Have you or any of your relatives ever had problems with anesthesia? No   14. Do you have sleep apnea, excessive snoring or daytime drowsiness? No   15. Do you have any artifical heart valves or other implanted medical devices like a pacemaker, defibrillator, or continuous glucose monitor? No   16. Do you have artificial joints? YES -    17. Are you allergic to latex? No   18. Is there any chance that you may be pregnant? -     Health Care Directive:  Patient does not have a Health Care Directive or Living Will: Patient states has Advance Directive and will bring in a copy to clinic.    Preoperative Review of :   reviewed - controlled substances reflected in medication list.    Status of Chronic Conditions:  See problem list for active medical problems.  Problems all longstanding and stable, except as noted/documented.  See ROS for pertinent symptoms related to these conditions.      Review of Systems  CONSTITUTIONAL: NEGATIVE for fever, chills, change in weight  INTEGUMENTARY/SKIN: NEGATIVE for worrisome rashes, moles or lesions  EYES: NEGATIVE for vision changes or irritation  ENT/MOUTH: NEGATIVE for ear, mouth and throat problems  RESP: NEGATIVE for significant cough or SOB  BREAST:  NEGATIVE for masses, tenderness or discharge  CV: NEGATIVE for chest pain, palpitations or peripheral edema  GI: NEGATIVE for nausea, abdominal pain, heartburn, or change in bowel habits  : NEGATIVE for frequency, dysuria, or hematuria  MUSCULOSKELETAL: NEGATIVE for significant arthralgias or myalgia  NEURO: NEGATIVE for weakness, dizziness or paresthesias  ENDOCRINE: NEGATIVE for temperature intolerance, skin/hair changes  HEME: NEGATIVE for bleeding problems  PSYCHIATRIC: NEGATIVE for changes in mood or affect    Patient Active Problem List    Diagnosis Date Noted     Personal history of malignant neoplasm of bladder 03/04/2021     Priority: Medium     Added automatically from request for surgery [id number]       Benign paroxysmal positional vertigo, bilateral 12/24/2020     Priority: Medium     Personal history of malignant neoplasm of breast 10/01/2020     Priority: Medium     Acute cystitis with hematuria 10/01/2020     Priority: Medium     Symptomatic stenosis of left carotid artery 09/22/2020     Priority: Medium     Added automatically from request for surgery [id number]       POSSIBLE Right internal carotid artery aneurysm 09/08/2020     Priority: Medium     Carotid stenosis, bilateral L80%, R40% 09/02/2020     Priority: Medium     Carotid artery plaque, bilateral 09/02/2020     Priority: Medium     Anemia 07/16/2020     Priority: Medium     S/P lumbar fusion 02/04/2020     Priority: Medium     Bilateral impacted cerumen 01/13/2020     Priority: Medium     CKD (chronic kidney disease) stage 3, GFR 30-59 ml/min 09/09/2019     Priority: Medium     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       Central stenosis of spinal  canal 12/01/2017     Priority: Medium     lumbar         DDD (degenerative disc disease), lumbar 12/01/2017     Priority: Medium     Chronic pain syndrome 11/30/2017     Priority: Medium     substancePatient is followed by Phani Jason PA-C for ongoing prescription of pain medication.  All refills should only be approved by this provider, or covering partner.    Medication(s): Oxycodone IR 5mg.   Maximum quantity per month: 20  Clinic visit frequency required: Q 3 months     Controlled substance agreement:  Encounter-Level CSA:     There are no encounter-level csa.        Pain Clinic evaluation in the past: Yes       Date/Location:      DIRE Total Score(s):  No flowsheet data found.    Last [facility] website verification:  none   [email or web address]             Lumbar radiculopathy 11/30/2017     Priority: Medium     S/P reverse total shoulder arthroplasty, right 06/05/2017     Priority: Medium     Prophylactic antibiotic for dental procedure indicated due to prior joint replacement 06/05/2017     Priority: Medium     Hyperlipidemia LDL goal <130 05/30/2017     Priority: Medium     Anxiety 03/29/2017     Priority: Medium     S/P bilateral mastectomy 02/08/2017     Priority: Medium     Encounter for antineoplastic chemotherapy 10/07/2016     Priority: Medium     Malignant neoplasm of upper-outer quadrant of left female breast (H) 10/06/2016     Priority: Medium     Adjustment disorder with depressed mood 11/11/2015     Priority: Medium     Essential hypertension 11/11/2015     Priority: Medium     Baker's cyst of knee 02/09/2015     Priority: Medium     Patella-femoral syndrome 02/09/2015     Priority: Medium     Hypertension goal BP (blood pressure) < 140/90 10/04/2011     Priority: Medium     Health Care Home 09/29/2011     Priority: Medium     x  DX V65.8 REPLACED WITH 31433 HEALTH CARE HOME (04/08/2013)       Advanced directives, counseling/discussion 08/22/2011     Priority: Medium     Advance Directive  Problem List Overview:   Name Relationship Phone    Primary Health Care Agent            Alternative Health Care Agent          Patient states has Advance Directive and will bring in a copy to clinic. 8/22/2011          Trochanteric bursitis 01/06/2009     Priority: Medium     Family history of stroke (cerebrovascular) 03/07/2008     Priority: Medium     Enthesopathy of hip region 01/30/2006     Priority: Medium      Past Medical History:   Diagnosis Date     Carotid stenosis, bilateral L80%, R40% 09/02/2020     Central stenosis of spinal canal 12/01/2017    lumbar      DDD (degenerative disc disease), lumbar 12/01/2017     Depressive disorder, not elsewhere classified      Esophageal reflux      ETOH abuse     in remission     Foraminal stenosis of lumbar region 12/01/2017    Complete lumbar spine left at various degrees and to a lesser extent the right as well.     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     Subdural hematoma (H)      Past Surgical History:   Procedure Laterality Date     ARTHROPLASTY SHOULDER Right 11/17/2015     ARTHROSCOPY KNEE  6/7/2012    Procedure:ARTHROSCOPY KNEE; right knee arthroscopy with partial lateral menisectomy; Surgeon:DAMIÁN MCALLISTER; Location:[facility] OR     C LIGATE FALLOPIAN TUBE       COLONOSCOPY N/A 12/22/2017    Procedure: COLONOSCOPY;  colonoscopy;  Surgeon: Chuck Chand MD;  Location: [facility] GI     ENDARTERECTOMY CAROTID Left 10/8/2020    Procedure: LEFT CAROTID ENDARTERECTOMY WITH EEG;  Surgeon: Lacho Jasmine MD;  Location: [facility] OR     EXCISE MASS AXILLA Left 9/16/2016    Procedure: EXCISE MASS AXILLA;  Surgeon: Keyon Blanco MD;  Location: [facility] OR     HC REMV CATARACT EXTRACAP,INSERT LENS, W/O ECP  6/25/2009    Right eye     INJECT EPIDURAL LUMBAR N/A 4/11/2019     Procedure: interlaminar epidual steroid injection lumbar 4-5;  Surgeon: Oskar Salvador MD;  Location: [facility] OR     INJECT EPIDURAL LUMBAR Bilateral 9/12/2019    Procedure: lumbar 4-5 epidural interlaminar steroid injection;  Surgeon: Oskar Salvador MD;  Location: [facility]     INSERT PORT VASCULAR ACCESS Right 10/7/2016    Procedure: INSERT PORT VASCULAR ACCESS;  Surgeon: Keyon Blanco MD;  Location[facility]     MASTECTOMY SIMPLE BILATERAL Bilateral 2/8/2017    Procedure: MASTECTOMY SIMPLE BILATERAL;  Surgeon: Keyon Blanco MD;  Location: [facility]     OPEN REDUCTION INTERNAL FIXATION FOOT Right 3/20/2015    Procedure: OPEN REDUCTION INTERNAL FIXATION FOOT;  Surgeon: Javi Herrmann DPM;  Location: [facility]     OPTICAL TRACKING SYSTEM FUSION SPINE POSTERIOR LUMBAR TWO LEVELS N/A 2/4/2020    Procedure: LUMBAR 2-SACRAL1 TRANSFORMINAL INTERBODY FUSION;  Surgeon: Lenny Gomes MD;  Location: [facility] OR     REMOVE PORT VASCULAR ACCESS Right 2/14/2018    Procedure: REMOVE PORT VASCULAR ACCESS;  right intra jugular port removal;  Surgeon: Keyon Blanco MD;  Location: [facility] OR     SHOULDER SURGERY Right 11/2015     [facility] NONSPECIFIC PROCEDURE  1956    ankle fracture surgery     Current Outpatient Medications   Medication Sig Dispense Refill     acetaminophen (TYLENOL) 500 MG tablet Take 1,000 mg by mouth 3 times daily as needed for mild pain (500MG X 2 = 1,000MG)       ALPRAZolam (XANAX) 0.5 MG tablet TAKE 1 TABLET BY MOUTH DAILY AT BEDTIME AS NEEDED FOR SLEEP 30 tablet 0     aspirin (ASA) 81 MG chewable tablet Take 1 tablet (81 mg) by mouth daily 100 tablet 3     atorvastatin (LIPITOR) 20 MG tablet TAKE ONE TABLET BY MOUTH ONCE DAILY 90 tablet 1     B-12 METHYLCOBALAMIN PO Take 5,000 mcg by mouth every morning       cephALEXin (KEFLEX) 250 MG capsule Take 1 capsule (250 mg) by mouth At Bedtime 30 capsule 11     COMPOUNDED NON-CONTROLLED SUBSTANCE (CMPD RX) - PHARMACY TO MIX COMPOUNDED MEDICATION Estriol 0.1% cream (1mg/gram)  in HRT base. Place 1 gram vaginally daily for 2 weeks, then vaginally twice weekly. 30 g 6     diphenhydrAMINE (BENADRYL) 25 MG tablet Take 25 mg by mouth nightly as needed for itching or allergies       escitalopram (LEXAPRO) 10 MG tablet TAKE 1 TABLET BY MOUTH EVERY DAY WITH 20MG TABLETS 90 tablet 1     escitalopram (LEXAPRO) 20 MG tablet TAKE 1 TABLET BY MOUTH EVERY DAY WITH 20MG TABLETS 90 tablet 1     Garlic 1000 MG CAPS Take 1,000 mg by mouth every morning       Magnesium Oxide 500 MG TABS Take 500 mg by mouth every morning       polyethylene glycol (MIRALAX/GLYCOLAX) powder Take 1 capful by mouth every morning        prochlorperazine (COMPAZINE) 10 MG tablet TAKE 1 TABLET BY MOUTH EVERY 6 HOURS AS NEEDED FOR NAUSEA AND VOMITING 30 tablet 0     Psyllium (METAMUCIL FIBER PO) Take 2 teaspoonful by mouth every morning (mix in with liquid and drink)       senna-docusate (SENOKOT-S/PERICOLACE) 8.6-50 MG tablet Take 2 tablets by mouth 2 times daily (Patient taking differently: Take 2 tablets by mouth 2 times daily as needed for constipation ) 60 tablet 0     vitamin C (ASCORBIC ACID) 1000 MG TABS Take 1,000 mg by mouth every morning       Vitamin D3 (CHOLECALCIFEROL) 25 mcg (1000 units) tablet Take by mouth daily       Zinc 30 MG CAPS Take 30 mg by mouth every morning          Allergies   Allergen Reactions     No Known Drug Allergies         Social History     Tobacco Use     Smoking status: Former Smoker     Packs/day: 3.00     Years: 10.00     Pack years: 30.00     Types: Cigarettes     Quit date: 1979     Years since quittin.2     Smokeless tobacco: Never Used     Tobacco comment: approx. 3 packs daily   Substance Use Topics     Alcohol use: Yes     Alcohol/week: 0.0 standard drinks     Comment: daily glass of wine     Family History   Problem Relation Age of Onset     Hypertension Mother      Thyroid Disease Mother      Cerebrovascular Disease Mother      Hypertension Father      Cerebrovascular  "Disease Father      Cancer Father         skin     Thyroid Disease Sister      Diabetes Brother         type 2     Depression Sister      Breast Cancer Sister         age 47     Cancer Sister         skin     Cancer Brother         inside nose     History   Drug Use No         Objective     BP (!) 140/80   Pulse 68   Temp 98.7  F (37.1  C) (Temporal)   Resp 16   Ht 1.697 m (5' 6.81\")   Wt 62.3 kg (137 lb 6.4 oz)   SpO2 99%   BMI 21.64 kg/m      Physical Exam    GENERAL APPEARANCE: healthy, alert and no distress     EYES: EOMI, PERRL     HENT: ear canals and TM's normal and nose and mouth without ulcers or lesions     NECK: no adenopathy, no asymmetry, masses, or scars and thyroid normal to palpation     RESP: lungs clear to auscultation - no rales, rhonchi or wheezes     CV: regular rates and rhythm, normal S1 S2, no S3 or S4 and no murmur, click or rub     ABDOMEN:  soft, nontender, no HSM or masses and bowel sounds normal     MS: extremities normal- no gross deformities noted, no evidence of inflammation in joints, FROM in all extremities.     SKIN: no suspicious lesions or rashes     NEURO: Normal strength and tone, sensory exam grossly normal, mentation intact and speech normal     PSYCH: mentation appears normal. and affect normal/bright     LYMPHATICS: No cervical adenopathy    Recent Labs   Lab Test 02/03/21  1511 01/21/21  1001 12/24/20  1104 12/24/20  1104 12/11/20  1034 10/08/20  0606 10/08/20  0606 10/01/20  1139   HGB  --   --   --  11.1* 11.0*   < >  --  11.3*   PLT  --   --   --  253 236   < >  --  269   INR  --   --   --   --   --   --   --  0.9    136   < > 136 139  --  139 137   POTASSIUM 4.2 4.3   < > 4.9 4.6  --  3.5 5.1   CR 1.12* 0.96   < > 1.20* 1.19*  --  0.89 1.13*   A1C  --   --   --   --   --   --  5.2  --     < > = values in this interval not displayed.        Diagnostics:  Labs pending at this time.  Results will be reviewed when available.   No EKG required for low risk " surgery (cataract, skin procedure, breast biopsy, etc).    Revised Cardiac Risk Index (RCRI):  The patient has the following serious cardiovascular risks for perioperative complications:   - No serious cardiac risks = 0 points     RCRI Interpretation: 1 point: Class II (low risk - 0.9% complication rate)           Signed Electronically by: Phani Jason PA-C  Copy of this evaluation report is provided to requesting physician.    [facility] Guidelines    Revised Cardiac Risk Index   Answers for HPI/ROS submitted by the patient on 3/8/2021   If you checked off any problems, how difficult have these problems made it for you to do your work, take care of things at home, or get along with other people?: Extremely difficult  PHQ9 TOTAL SCORE: 8  [place] 7 TOTAL SCORE: 18

## 2021-03-04 NOTE — PROGRESS NOTES
[address]  [facility] [address]  Phone: [phone]  Primary Provider: Phani Tapia  Pre-op Performing Provider: PHANI TAPIA      PREOPERATIVE EVALUATION:  Today's date: 3/8/2021    Michaela Dorman is a 70 year old female who presents for a preoperative evaluation.    Surgical Information:  Surgery/Procedure: Cystoscopy   Surgery Location: [facility]   Surgeon: Guero  Surgery Date: 03/18  Time of Surgery: 12  Where patient plans to recover: At home with family  Fax number for surgical facility: Note does not need to be faxed, will be available electronically in Epic.    Type of Anesthesia Anticipated: Choice    Assessment & Plan     The proposed surgical procedure is considered INTERMEDIATE risk.    Acute cystitis with hematuria  Malignant neoplasm of urinary bladder, unspecified site (H)  Approved for surgical intervention.           Risks and Recommendations:  The patient has the following additional risks and recommendations for perioperative complications:   - No identified additional risk factors other than previously addressed    Medication Instructions:  Patient is to take all scheduled medications on the day of surgery   - aspirin: Discontinue aspirin 7-10 days prior to procedure to reduce bleeding risk. It should be resumed postoperatively.     RECOMMENDATION:  APPROVAL GIVEN to proceed with proposed procedure, without further diagnostic evaluation.        Subjective     HPI related to upcoming procedure: bladder Cancer evaluation      Preop Questions 3/8/2021   1. Have you ever had a heart attack or stroke? No   2. Have you ever had surgery on your heart or blood vessels, such as a stent placement, a coronary artery bypass, or surgery on an artery in your head, neck, heart, or legs? YES -      3. Do you have chest pain with activity? No   4. Do you have a history of  heart failure? No   5. Do you currently have a cold, bronchitis or  symptoms of other infection? No   6. Do you have a cough, shortness of breath, or wheezing? No   7. Do you or anyone in your family have previous history of blood clots? No   8. Do you or does anyone in your family have a serious bleeding problem such as prolonged bleeding following surgeries or cuts? No   9. Have you ever had problems with anemia or been told to take iron pills? YES -    10. Have you had any abnormal blood loss such as black, tarry or bloody stools, or abnormal vaginal bleeding? No   11. Have you ever had a blood transfusion? YES -    11a. Have you ever had a transfusion reaction? No   12. Are you willing to have a blood transfusion if it is medically needed before, during, or after your surgery? Yes   13. Have you or any of your relatives ever had problems with anesthesia? No   14. Do you have sleep apnea, excessive snoring or daytime drowsiness? No   15. Do you have any artifical heart valves or other implanted medical devices like a pacemaker, defibrillator, or continuous glucose monitor? No   16. Do you have artificial joints? YES -    17. Are you allergic to latex? No   18. Is there any chance that you may be pregnant? -     Health Care Directive:  Patient does not have a Health Care Directive or Living Will: Patient states has Advance Directive and will bring in a copy to clinic.    Preoperative Review of :   reviewed - controlled substances reflected in medication list.    Status of Chronic Conditions:  See problem list for active medical problems.  Problems all longstanding and stable, except as noted/documented.  See ROS for pertinent symptoms related to these conditions.      Review of Systems  CONSTITUTIONAL: NEGATIVE for fever, chills, change in weight  INTEGUMENTARY/SKIN: NEGATIVE for worrisome rashes, moles or lesions  EYES: NEGATIVE for vision changes or irritation  ENT/MOUTH: NEGATIVE for ear, mouth and throat problems  RESP: NEGATIVE for significant cough or SOB  BREAST:  NEGATIVE for masses, tenderness or discharge  CV: NEGATIVE for chest pain, palpitations or peripheral edema  GI: NEGATIVE for nausea, abdominal pain, heartburn, or change in bowel habits  : NEGATIVE for frequency, dysuria, or hematuria  MUSCULOSKELETAL: NEGATIVE for significant arthralgias or myalgia  NEURO: NEGATIVE for weakness, dizziness or paresthesias  ENDOCRINE: NEGATIVE for temperature intolerance, skin/hair changes  HEME: NEGATIVE for bleeding problems  PSYCHIATRIC: NEGATIVE for changes in mood or affect    Patient Active Problem List    Diagnosis Date Noted     Personal history of malignant neoplasm of bladder 03/04/2021     Priority: Medium     Added automatically from request for surgery [id number]       Benign paroxysmal positional vertigo, bilateral 12/24/2020     Priority: Medium     Personal history of malignant neoplasm of breast 10/01/2020     Priority: Medium     Acute cystitis with hematuria 10/01/2020     Priority: Medium     Symptomatic stenosis of left carotid artery 09/22/2020     Priority: Medium     Added automatically from request for surgery [id number]       POSSIBLE Right internal carotid artery aneurysm 09/08/2020     Priority: Medium     Carotid stenosis, bilateral L80%, R40% 09/02/2020     Priority: Medium     Carotid artery plaque, bilateral 09/02/2020     Priority: Medium     Anemia 07/16/2020     Priority: Medium     S/P lumbar fusion 02/04/2020     Priority: Medium     Bilateral impacted cerumen 01/13/2020     Priority: Medium     CKD (chronic kidney disease) stage 3, GFR 30-59 ml/min 09/09/2019     Priority: Medium     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       Central stenosis of spinal  canal 12/01/2017     Priority: Medium     lumbar         DDD (degenerative disc disease), lumbar 12/01/2017     Priority: Medium     Chronic pain syndrome 11/30/2017     Priority: Medium     substancePatient is followed by Phani Jason PA-C for ongoing prescription of pain medication.  All refills should only be approved by this provider, or covering partner.    Medication(s): Oxycodone IR 5mg.   Maximum quantity per month: 20  Clinic visit frequency required: Q 3 months     Controlled substance agreement:  Encounter-Level CSA:     There are no encounter-level csa.        Pain Clinic evaluation in the past: Yes       Date/Location:      DIRE Total Score(s):  No flowsheet data found.    Last [facility] website verification:  none   [email or web address]             Lumbar radiculopathy 11/30/2017     Priority: Medium     S/P reverse total shoulder arthroplasty, right 06/05/2017     Priority: Medium     Prophylactic antibiotic for dental procedure indicated due to prior joint replacement 06/05/2017     Priority: Medium     Hyperlipidemia LDL goal <130 05/30/2017     Priority: Medium     Anxiety 03/29/2017     Priority: Medium     S/P bilateral mastectomy 02/08/2017     Priority: Medium     Encounter for antineoplastic chemotherapy 10/07/2016     Priority: Medium     Malignant neoplasm of upper-outer quadrant of left female breast (H) 10/06/2016     Priority: Medium     Adjustment disorder with depressed mood 11/11/2015     Priority: Medium     Essential hypertension 11/11/2015     Priority: Medium     Baker's cyst of knee 02/09/2015     Priority: Medium     Patella-femoral syndrome 02/09/2015     Priority: Medium     Hypertension goal BP (blood pressure) < 140/90 10/04/2011     Priority: Medium     Health Care Home 09/29/2011     Priority: Medium     x  DX V65.8 REPLACED WITH [id number] HEALTH CARE HOME (04/08/2013)       Advanced directives, counseling/discussion 08/22/2011     Priority: Medium     Advance Directive  Problem List Overview:   Name Relationship Phone    Primary Health Care Agent            Alternative Health Care Agent          Patient states has Advance Directive and will bring in a copy to clinic. 8/22/2011          Trochanteric bursitis 01/06/2009     Priority: Medium     Family history of stroke (cerebrovascular) 03/07/2008     Priority: Medium     Enthesopathy of hip region 01/30/2006     Priority: Medium      Past Medical History:   Diagnosis Date     Carotid stenosis, bilateral L80%, R40% 09/02/2020     Central stenosis of spinal canal 12/01/2017    lumbar      DDD (degenerative disc disease), lumbar 12/01/2017     Depressive disorder, not elsewhere classified      Esophageal reflux      ETOH abuse     in remission     Foraminal stenosis of lumbar region 12/01/2017    Complete lumbar spine left at various degrees and to a lesser extent the right as well.     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     Subdural hematoma (H)      Past Surgical History:   Procedure Laterality Date     ARTHROPLASTY SHOULDER Right 11/17/2015     ARTHROSCOPY KNEE  6/7/2012    Procedure:ARTHROSCOPY KNEE; right knee arthroscopy with partial lateral menisectomy; Surgeon:DAMIÁN MCALLISTER; [facility]     C LIGATE FALLOPIAN TUBE       COLONOSCOPY N/A 12/22/2017    Procedure: COLONOSCOPY;  colonoscopy;  Surgeon: Chuck Chand MD;  [facility]     ENDARTERECTOMY CAROTID Left 10/8/2020    Procedure: LEFT CAROTID ENDARTERECTOMY WITH EEG;  Surgeon: Lacho Jasmine MD;  [facility]: [facility]     EXCISE MASS AXILLA Left 9/16/2016    Procedure: EXCISE MASS AXILLA;  Surgeon: Keyon Blanco MD;  [facility]: [facility]     HC REMV CATARACT EXTRACAP,INSERT LENS, W/O ECP  6/25/2009    Right eye     INJECT EPIDURAL LUMBAR N/A 4/11/2019     Procedure: interlaminar epidual steroid injection lumbar 4-5;  Surgeon: Oskar Salvador MD;  [facility]: [facility]     INJECT EPIDURAL LUMBAR Bilateral 9/12/2019    Procedure: lumbar 4-5 epidural interlaminar steroid injection;  Surgeon: Oskar Salvador MD;  [facility]     INSERT PORT VASCULAR ACCESS Right 10/7/2016    Procedure: INSERT PORT VASCULAR ACCESS;  Surgeon: Keyon Blanco MD;  [facility]     MASTECTOMY SIMPLE BILATERAL Bilateral 2/8/2017    Procedure: MASTECTOMY SIMPLE BILATERAL;  Surgeon: Keyon Blanco MD;  [facility]     OPEN REDUCTION INTERNAL FIXATION FOOT Right 3/20/2015    Procedure: OPEN REDUCTION INTERNAL FIXATION FOOT;  Surgeon: Javi Herrmann DPM;  [facility]     OPTICAL TRACKING SYSTEM FUSION SPINE POSTERIOR LUMBAR TWO LEVELS N/A 2/4/2020    Procedure: LUMBAR 2-SACRAL1 TRANSFORMINAL INTERBODY FUSION;  Surgeon: Lenny Gomes MD;  Location: [facility]     REMOVE PORT VASCULAR ACCESS Right 2/14/2018    Procedure: REMOVE PORT VASCULAR ACCESS;  right intra jugular port removal;  Surgeon: Keyon Blanco MD;  [facility]: [facility]     SHOULDER SURGERY Right 11/2015     [facility] NONSPECIFIC PROCEDURE  1956    ankle fracture surgery     Current Outpatient Medications   Medication Sig Dispense Refill     acetaminophen (TYLENOL) 500 MG tablet Take 1,000 mg by mouth 3 times daily as needed for mild pain (500MG X 2 = 1,000MG)       ALPRAZolam (XANAX) 0.5 MG tablet TAKE 1 TABLET BY MOUTH DAILY AT BEDTIME AS NEEDED FOR SLEEP 30 tablet 0     aspirin (ASA) 81 MG chewable tablet Take 1 tablet (81 mg) by mouth daily 100 tablet 3     atorvastatin (LIPITOR) 20 MG tablet TAKE ONE TABLET BY MOUTH ONCE DAILY 90 tablet 1     B-12 METHYLCOBALAMIN PO Take 5,000 mcg by mouth every morning       cephALEXin (KEFLEX) 250 MG capsule Take 1 capsule (250 mg) by mouth At Bedtime 30 capsule 11     COMPOUNDED NON-CONTROLLED SUBSTANCE (CMPD RX) - PHARMACY TO MIX COMPOUNDED MEDICATION Estriol 0.1% cream (1mg/gram)  in HRT base. Place 1 gram vaginally daily for 2 weeks, then vaginally twice weekly. 30 g 6     diphenhydrAMINE (BENADRYL) 25 MG tablet Take 25 mg by mouth nightly as needed for itching or allergies       escitalopram (LEXAPRO) 10 MG tablet TAKE 1 TABLET BY MOUTH EVERY DAY WITH 20MG TABLETS 90 tablet 1     escitalopram (LEXAPRO) 20 MG tablet TAKE 1 TABLET BY MOUTH EVERY DAY WITH 20MG TABLETS 90 tablet 1     Garlic 1000 MG CAPS Take 1,000 mg by mouth every morning       Magnesium Oxide 500 MG TABS Take 500 mg by mouth every morning       polyethylene glycol (MIRALAX/GLYCOLAX) powder Take 1 capful by mouth every morning        prochlorperazine (COMPAZINE) 10 MG tablet TAKE 1 TABLET BY MOUTH EVERY 6 HOURS AS NEEDED FOR NAUSEA AND VOMITING 30 tablet 0     Psyllium (METAMUCIL FIBER PO) Take 2 teaspoonful by mouth every morning (mix in with liquid and drink)       senna-docusate (SENOKOT-S/PERICOLACE) 8.6-50 MG tablet Take 2 tablets by mouth 2 times daily (Patient taking differently: Take 2 tablets by mouth 2 times daily as needed for constipation ) 60 tablet 0     vitamin C (ASCORBIC ACID) 1000 MG TABS Take 1,000 mg by mouth every morning       Vitamin D3 (CHOLECALCIFEROL) 25 mcg (1000 units) tablet Take by mouth daily       Zinc 30 MG CAPS Take 30 mg by mouth every morning          Allergies   Allergen Reactions     No Known Drug Allergies         Social History     Tobacco Use     Smoking status: Former Smoker     Packs/day: 3.00     Years: 10.00     Pack years: 30.00     Types: Cigarettes     Quit date: 1979     Years since quittin.2     Smokeless tobacco: Never Used     Tobacco comment: approx. 3 packs daily   Substance Use Topics     Alcohol use: Yes     Alcohol/week: 0.0 standard drinks     Comment: daily glass of wine     Family History   Problem Relation Age of Onset     Hypertension Mother      Thyroid Disease Mother      Cerebrovascular Disease Mother      Hypertension Father      Cerebrovascular  "Disease Father      Cancer Father         skin     Thyroid Disease Sister      Diabetes Brother         type 2     Depression Sister      Breast Cancer Sister         age 47     Cancer Sister         skin     Cancer Brother         inside nose     History   Drug Use No         Objective     BP (!) 140/80   Pulse 68   Temp 98.7  F (37.1  C) (Temporal)   Resp 16   Ht 1.697 m (5' 6.81\")   Wt 62.3 kg (137 lb 6.4 oz)   SpO2 99%   BMI 21.64 kg/m      Physical Exam    GENERAL APPEARANCE: healthy, alert and no distress     EYES: EOMI, PERRL     HENT: ear canals and TM's normal and nose and mouth without ulcers or lesions     NECK: no adenopathy, no asymmetry, masses, or scars and thyroid normal to palpation     RESP: lungs clear to auscultation - no rales, rhonchi or wheezes     CV: regular rates and rhythm, normal S1 S2, no S3 or S4 and no murmur, click or rub     ABDOMEN:  soft, nontender, no HSM or masses and bowel sounds normal     MS: extremities normal- no gross deformities noted, no evidence of inflammation in joints, FROM in all extremities.     SKIN: no suspicious lesions or rashes     NEURO: Normal strength and tone, sensory exam grossly normal, mentation intact and speech normal     PSYCH: mentation appears normal. and affect normal/bright     LYMPHATICS: No cervical adenopathy    Recent Labs   Lab Test 02/03/21  1511 01/21/21  1001 12/24/20  1104 12/24/20  1104 12/11/20  1034 10/08/20  0606 10/08/20  0606 10/01/20  1139   HGB  --   --   --  11.1* 11.0*   < >  --  11.3*   PLT  --   --   --  253 236   < >  --  269   INR  --   --   --   --   --   --   --  0.9    136   < > 136 139  --  139 137   POTASSIUM 4.2 4.3   < > 4.9 4.6  --  3.5 5.1   CR 1.12* 0.96   < > 1.20* 1.19*  --  0.89 1.13*   A1C  --   --   --   --   --   --  5.2  --     < > = values in this interval not displayed.        Diagnostics:  Labs pending at this time.  Results will be reviewed when available.   No EKG required for low risk " surgery (cataract, skin procedure, breast biopsy, etc).    Revised Cardiac Risk Index (RCRI):  The patient has the following serious cardiovascular risks for perioperative complications:   - No serious cardiac risks = 0 points     RCRI Interpretation: 1 point: Class II (low risk - 0.9% complication rate)           Signed Electronically by: Phani Jason PA-C  Copy of this evaluation report is provided to requesting physician.    [facility] Guidelines    Revised Cardiac Risk Index   Answers for HPI/ROS submitted by the patient on 3/8/2021   If you checked off any problems, how difficult have these problems made it for you to do your work, take care of things at home, or get along with other people?: Extremely difficult  PHQ9 TOTAL SCORE: 8  [place] 7 TOTAL SCORE: 18

## 2021-03-04 NOTE — CONFIDENTIAL NOTE
Patient was contacted and surgery has been scheduled. See 3/3/21 my chart encounter.    Iwona Meyer RN, BSN

## 2021-03-04 NOTE — TELEPHONE ENCOUNTER
Date Scheduled: 3-18-21 12:00pm  Facility: [facility]  Surgeon: Dr. Jack   Post-op appointment scheduled:    scheduled?: No  Surgery packet/instructions confirmed received?  No  Special Considerations: COVID test scheduled for 3-14-21 Caio Shankar, Surgery Scheduling Coordinator

## 2021-03-08 ENCOUNTER — OFFICE VISIT (OUTPATIENT)
Dept: FAMILY MEDICINE | Facility: OTHER | Age: 71
End: 2021-03-08
Payer: COMMERCIAL

## 2021-03-08 VITALS
HEIGHT: 67 IN | RESPIRATION RATE: 16 BRPM | BODY MASS INDEX: 21.56 KG/M2 | TEMPERATURE: 98.7 F | SYSTOLIC BLOOD PRESSURE: 140 MMHG | DIASTOLIC BLOOD PRESSURE: 80 MMHG | OXYGEN SATURATION: 99 % | HEART RATE: 68 BPM | WEIGHT: 137.4 LBS

## 2021-03-08 DIAGNOSIS — C77.5 SECONDARY AND UNSPECIFIED MALIGNANT NEOPLASM OF INTRAPELVIC LYMPH NODES (H): ICD-10-CM

## 2021-03-08 DIAGNOSIS — C67.9 MALIGNANT NEOPLASM OF URINARY BLADDER, UNSPECIFIED SITE (H): ICD-10-CM

## 2021-03-08 DIAGNOSIS — N30.01 ACUTE CYSTITIS WITH HEMATURIA: Primary | ICD-10-CM

## 2021-03-08 DIAGNOSIS — N39.0 RECURRENT UTI: ICD-10-CM

## 2021-03-08 DIAGNOSIS — R82.90 ABNORMAL URINE FINDINGS: ICD-10-CM

## 2021-03-08 LAB
ALBUMIN UR-MCNC: NEGATIVE MG/DL
APPEARANCE UR: CLEAR
BACTERIA #/AREA URNS HPF: ABNORMAL /HPF
BILIRUB UR QL STRIP: NEGATIVE
COLOR UR AUTO: YELLOW
GLUCOSE UR STRIP-MCNC: 100 MG/DL
HGB UR QL STRIP: ABNORMAL
KETONES UR STRIP-MCNC: NEGATIVE MG/DL
LEUKOCYTE ESTERASE UR QL STRIP: ABNORMAL
NITRATE UR QL: POSITIVE
NON-SQ EPI CELLS #/AREA URNS LPF: ABNORMAL /LPF
PH UR STRIP: 7 PH (ref 5–7)
RBC #/AREA URNS AUTO: ABNORMAL /HPF
SOURCE: ABNORMAL
SP GR UR STRIP: 1.01 (ref 1–1.03)
SPECIMEN SOURCE: NORMAL
UROBILINOGEN UR STRIP-ACNC: 0.2 EU/DL (ref 0.2–1)
WBC #/AREA URNS AUTO: ABNORMAL /HPF
WET PREP SPEC: NORMAL

## 2021-03-08 PROCEDURE — 99214 OFFICE O/P EST MOD 30 MIN: CPT | Performed by: PHYSICIAN ASSISTANT

## 2021-03-08 PROCEDURE — 81001 URINALYSIS AUTO W/SCOPE: CPT | Performed by: PHYSICIAN ASSISTANT

## 2021-03-08 PROCEDURE — 87086 URINE CULTURE/COLONY COUNT: CPT | Performed by: PHYSICIAN ASSISTANT

## 2021-03-08 PROCEDURE — 87210 SMEAR WET MOUNT SALINE/INK: CPT | Performed by: PHYSICIAN ASSISTANT

## 2021-03-08 RX ORDER — NITROFURANTOIN 25; 75 MG/1; MG/1
100 CAPSULE ORAL 2 TIMES DAILY
Qty: 20 CAPSULE | Refills: 0 | Status: SHIPPED | OUTPATIENT
Start: 2021-03-08 | End: 2021-03-18

## 2021-03-08 RX ORDER — CELECOXIB 200 MG/1
CAPSULE ORAL
COMMUNITY
Start: 2021-01-21 | End: 2021-05-03

## 2021-03-08 RX ORDER — OXYBUTYNIN CHLORIDE 5 MG/1
TABLET ORAL
COMMUNITY
Start: 2021-01-21 | End: 2021-03-19

## 2021-03-08 RX ORDER — SULFAMETHOXAZOLE AND TRIMETHOPRIM 400; 80 MG/1; MG/1
TABLET ORAL
COMMUNITY
Start: 2020-11-23 | End: 2021-03-18

## 2021-03-08 ASSESSMENT — ANXIETY QUESTIONNAIRES
1. FEELING NERVOUS, ANXIOUS, OR ON EDGE: NEARLY EVERY DAY
7. FEELING AFRAID AS IF SOMETHING AWFUL MIGHT HAPPEN: NEARLY EVERY DAY
GAD7 TOTAL SCORE: 18
3. WORRYING TOO MUCH ABOUT DIFFERENT THINGS: NEARLY EVERY DAY
GAD7 TOTAL SCORE: 18
2. NOT BEING ABLE TO STOP OR CONTROL WORRYING: NEARLY EVERY DAY
GAD7 TOTAL SCORE: 18
6. BECOMING EASILY ANNOYED OR IRRITABLE: NOT AT ALL
7. FEELING AFRAID AS IF SOMETHING AWFUL MIGHT HAPPEN: NEARLY EVERY DAY
4. TROUBLE RELAXING: NEARLY EVERY DAY
5. BEING SO RESTLESS THAT IT IS HARD TO SIT STILL: NEARLY EVERY DAY

## 2021-03-08 ASSESSMENT — PATIENT HEALTH QUESTIONNAIRE - PHQ9
SUM OF ALL RESPONSES TO PHQ QUESTIONS 1-9: 8
SUM OF ALL RESPONSES TO PHQ QUESTIONS 1-9: 8
10. IF YOU CHECKED OFF ANY PROBLEMS, HOW DIFFICULT HAVE THESE PROBLEMS MADE IT FOR YOU TO DO YOUR WORK, TAKE CARE OF THINGS AT HOME, OR GET ALONG WITH OTHER PEOPLE: EXTREMELY DIFFICULT

## 2021-03-08 ASSESSMENT — MIFFLIN-ST. JEOR: SCORE: 1167.86

## 2021-03-09 LAB
BACTERIA SPEC CULT: NORMAL
Lab: NORMAL
SPECIMEN SOURCE: NORMAL

## 2021-03-09 ASSESSMENT — ANXIETY QUESTIONNAIRES: GAD7 TOTAL SCORE: 18

## 2021-03-09 ASSESSMENT — PATIENT HEALTH QUESTIONNAIRE - PHQ9: SUM OF ALL RESPONSES TO PHQ QUESTIONS 1-9: 8

## 2021-03-11 ENCOUNTER — ANESTHESIA EVENT (OUTPATIENT)
Dept: SURGERY | Facility: AMBULATORY SURGERY CENTER | Age: 71
End: 2021-03-11

## 2021-03-14 DIAGNOSIS — Z11.59 ENCOUNTER FOR SCREENING FOR OTHER VIRAL DISEASES: ICD-10-CM

## 2021-03-14 LAB
SARS-COV-2 RNA RESP QL NAA+PROBE: NORMAL
SPECIMEN SOURCE: NORMAL

## 2021-03-14 PROCEDURE — U0003 INFECTIOUS AGENT DETECTION BY NUCLEIC ACID (DNA OR RNA); SEVERE ACUTE RESPIRATORY SYNDROME CORONAVIRUS 2 (SARS-COV-2) (CORONAVIRUS DISEASE [COVID-19]), AMPLIFIED PROBE TECHNIQUE, MAKING USE OF HIGH THROUGHPUT TECHNOLOGIES AS DESCRIBED BY CMS-2020-01-R: HCPCS | Performed by: UROLOGY

## 2021-03-14 PROCEDURE — U0005 INFEC AGEN DETEC AMPLI PROBE: HCPCS | Performed by: UROLOGY

## 2021-03-15 LAB
LABORATORY COMMENT REPORT: NORMAL
SARS-COV-2 RNA RESP QL NAA+PROBE: NEGATIVE
SPECIMEN SOURCE: NORMAL

## 2021-03-17 NOTE — ANESTHESIA PREPROCEDURE EVALUATION
Anesthesia Pre-Procedure Evaluation    Patient: Michaela Dorman   MRN: [id number] : 1950        Preoperative Diagnosis: Personal history of malignant neoplasm of bladder [Z85.51]   Procedure : Procedure(s):  CYSTOSCOPY, WITH TRANSURETHRAL RESECTION BLADDER TUMOR  CYSTOSCOPY, WITH RETROGRADE PYELOGRAM     Past Medical History:   Diagnosis Date     Carotid stenosis, bilateral L80%, R40% 2020     Central stenosis of spinal canal 2017    lumbar      DDD (degenerative disc disease), lumbar 2017     Depressive disorder, not elsewhere classified      Esophageal reflux      ETOH abuse     in remission     Foraminal stenosis of lumbar region 2017    Complete lumbar spine left at various degrees and to a lesser extent the right as well.     Lumbar radiculopathy 2017     Personal history of malignant neoplasm of breast      Prophylactic antibiotic for dental procedure indicated due to prior joint replacement 2017     S/P reverse total shoulder arthroplasty, right 2017     Subdural hematoma (H)       Past Surgical History:   Procedure Laterality Date     ARTHROPLASTY SHOULDER Right 2015     ARTHROSCOPY KNEE  2012    Procedure:ARTHROSCOPY KNEE; right knee arthroscopy with partial lateral menisectomy; Surgeon:DAMIÁN MCALLISTER; [facility]     C LIGATE FALLOPIAN TUBE       COLONOSCOPY N/A 2017    Procedure: COLONOSCOPY;  colonoscopy;  Surgeon: Chuck Chand MD;  [facility]     ENDARTERECTOMY CAROTID Left 10/8/2020    Procedure: LEFT CAROTID ENDARTERECTOMY WITH EEG;  Surgeon: Lacho Jasmine MD;  Location: [facility]     EXCISE MASS AXILLA Left 2016    Procedure: EXCISE MASS AXILLA;  Surgeon: Keyon Blanco MD;  Location:  OR     HC REMV CATARACT EXTRACAP,INSERT LENS, W/O ECP  2009    Right eye     INJECT EPIDURAL LUMBAR N/A 2019    Procedure: interlaminar epidual steroid injection lumbar 4-5;  Surgeon: Oskar Salvador MD;  Location:  [facility]     INJECT EPIDURAL LUMBAR Bilateral 2019    Procedure: lumbar 4-5 epidural interlaminar steroid injection;  Surgeon: Oskar Salvador MD;  [facility]: [facility]     INSERT PORT VASCULAR ACCESS Right 10/7/2016    Procedure: INSERT PORT VASCULAR ACCESS;  Surgeon: Keyon Blanco MD;  [facility]     MASTECTOMY SIMPLE BILATERAL Bilateral 2017    Procedure: MASTECTOMY SIMPLE BILATERAL;  Surgeon: Keyon Blanco MD;  [facility]     OPEN REDUCTION INTERNAL FIXATION FOOT Right 3/20/2015    Procedure: OPEN REDUCTION INTERNAL FIXATION FOOT;  Surgeon: Javi Herrmann DPM;  [facility]     OPTICAL TRACKING SYSTEM FUSION SPINE POSTERIOR LUMBAR TWO LEVELS N/A 2020    Procedure: LUMBAR 2-SACRAL1 TRANSFORMINAL INTERBODY FUSION;  Surgeon: Lenny Gomes MD;  Location: [facility]     REMOVE PORT VASCULAR ACCESS Right 2018    Procedure: REMOVE PORT VASCULAR ACCESS;  right intra jugular port removal;  Surgeon: Keyon Blanco MD;  Location: [facility]     SHOULDER SURGERY Right 2015     [facility] NONSPECIFIC PROCEDURE      ankle fracture surgery      Allergies   Allergen Reactions     No Known Drug Allergies       Social History     Tobacco Use     Smoking status: Former Smoker     Packs/day: 3.00     Years: 10.00     Pack years: 30.00     Types: Cigarettes     Quit date: 1979     Years since quittin.3     Smokeless tobacco: Never Used     Tobacco comment: approx. 3 packs daily   Substance Use Topics     Alcohol use: Yes     Alcohol/week: 0.0 standard drinks     Comment: daily glass of wine      Wt Readings from Last 1 Encounters:   21 62.3 kg (137 lb 6.4 oz)           Physical Exam    Airway        Mallampati: II   TM distance: > 3 FB   Neck ROM: full   Mouth opening: > 3 cm    Respiratory Devices and Support         Dental  no notable dental history         Cardiovascular   cardiovascular exam normal          Pulmonary   pulmonary exam normal                OUTSIDE LABS:  CBC:    Lab Results   Component Value Date    WBC 7.4 12/24/2020    WBC 8.4 12/11/2020    HGB 11.1 (L) 12/24/2020    HGB 11.0 (L) 12/11/2020    HCT 34.5 (L) 12/24/2020    HCT 34.8 (L) 12/11/2020     12/24/2020     12/11/2020     BMP:   Lab Results   Component Value Date     02/03/2021     01/21/2021    POTASSIUM 4.2 02/03/2021    POTASSIUM 4.3 01/21/2021    CHLORIDE 105 02/03/2021    CHLORIDE 104 01/21/2021    CO2 26 02/03/2021    CO2 28 01/21/2021    BUN 26 02/03/2021    BUN 18 01/21/2021    CR 1.12 (H) 02/03/2021    CR 0.96 01/21/2021    GLC 83 02/03/2021    GLC 86 01/21/2021     COAGS:   Lab Results   Component Value Date    PTT 27 05/14/2008    INR 0.9 10/01/2020     POC:   Lab Results   Component Value Date    BGM 91 02/11/2020     HEPATIC:   Lab Results   Component Value Date    ALBUMIN 4.1 02/03/2021    PROTTOTAL 7.9 12/24/2020    ALT 24 12/24/2020    AST 21 12/24/2020    GGT 43 (H) 10/06/2011    ALKPHOS 42 12/24/2020    BILITOTAL 0.3 12/24/2020     OTHER:   Lab Results   Component Value Date    LACT 1.1 02/09/2020    A1C 5.2 10/08/2020    VANDANA 9.3 02/03/2021    PHOS 3.3 02/03/2021    TSH 1.34 01/24/2017    SED 9 07/16/2008       Anesthesia Plan    ASA Status:  3      Anesthesia Type: General.     - Airway: LMA   Induction: Intravenous, Propofol.   Maintenance: Balanced.        Consents         - Extended Intubation/Ventilatory Support Discussed: No.      - Patient is DNR/DNI Status: No    Use of blood products discussed: No .     Postoperative Care    Pain management: IV analgesics, Oral pain medications, Multi-modal analgesia.   PONV prophylaxis: Ondansetron (or other 5HT-3), Dexamethasone or Solumedrol     Comments:                Missael Booth MD

## 2021-03-18 ENCOUNTER — ANESTHESIA (OUTPATIENT)
Dept: SURGERY | Facility: AMBULATORY SURGERY CENTER | Age: 71
End: 2021-03-18

## 2021-03-18 ENCOUNTER — HOSPITAL ENCOUNTER (OUTPATIENT)
Facility: AMBULATORY SURGERY CENTER | Age: 71
Discharge: HOME | End: 2021-03-18
Attending: UROLOGY | Admitting: UROLOGY
Payer: COMMERCIAL

## 2021-03-18 ENCOUNTER — ANCILLARY PROCEDURE (OUTPATIENT)
Dept: GENERAL RADIOLOGY | Facility: CLINIC | Age: 71
End: 2021-03-18
Attending: UROLOGY
Payer: COMMERCIAL

## 2021-03-18 VITALS
HEART RATE: 73 BPM | RESPIRATION RATE: 8 BRPM | OXYGEN SATURATION: 93 % | SYSTOLIC BLOOD PRESSURE: 128 MMHG | DIASTOLIC BLOOD PRESSURE: 68 MMHG | TEMPERATURE: 97.4 F

## 2021-03-18 DIAGNOSIS — Z85.51 PERSONAL HISTORY OF MALIGNANT NEOPLASM OF BLADDER: ICD-10-CM

## 2021-03-18 DIAGNOSIS — C67.9 MALIGNANT NEOPLASM OF URINARY BLADDER, UNSPECIFIED SITE (H): Primary | ICD-10-CM

## 2021-03-18 PROCEDURE — 52235 CYSTOSCOPY AND TREATMENT: CPT

## 2021-03-18 PROCEDURE — 88305 TISSUE EXAM BY PATHOLOGIST: CPT | Performed by: PATHOLOGY

## 2021-03-18 PROCEDURE — G8918 PT W/O PREOP ORDER IV AB PRO: HCPCS

## 2021-03-18 PROCEDURE — G8907 PT DOC NO EVENTS ON DISCHARG: HCPCS

## 2021-03-18 RX ORDER — FENTANYL CITRATE 50 UG/ML
25-50 INJECTION, SOLUTION INTRAMUSCULAR; INTRAVENOUS
Status: DISCONTINUED | OUTPATIENT
Start: 2021-03-18 | End: 2021-03-19 | Stop reason: HOSPADM

## 2021-03-18 RX ORDER — NALOXONE HYDROCHLORIDE 0.4 MG/ML
0.2 INJECTION, SOLUTION INTRAMUSCULAR; INTRAVENOUS; SUBCUTANEOUS
Status: DISCONTINUED | OUTPATIENT
Start: 2021-03-18 | End: 2021-03-19 | Stop reason: HOSPADM

## 2021-03-18 RX ORDER — ONDANSETRON 2 MG/ML
INJECTION INTRAMUSCULAR; INTRAVENOUS PRN
Status: DISCONTINUED | OUTPATIENT
Start: 2021-03-18 | End: 2021-03-18

## 2021-03-18 RX ORDER — FUROSEMIDE 10 MG/ML
INJECTION INTRAMUSCULAR; INTRAVENOUS PRN
Status: DISCONTINUED | OUTPATIENT
Start: 2021-03-18 | End: 2021-03-18

## 2021-03-18 RX ORDER — LIDOCAINE HYDROCHLORIDE 20 MG/ML
INJECTION, SOLUTION INFILTRATION; PERINEURAL PRN
Status: DISCONTINUED | OUTPATIENT
Start: 2021-03-18 | End: 2021-03-18

## 2021-03-18 RX ORDER — NALOXONE HYDROCHLORIDE 0.4 MG/ML
0.4 INJECTION, SOLUTION INTRAMUSCULAR; INTRAVENOUS; SUBCUTANEOUS
Status: DISCONTINUED | OUTPATIENT
Start: 2021-03-18 | End: 2021-03-19 | Stop reason: HOSPADM

## 2021-03-18 RX ORDER — LIDOCAINE 40 MG/G
CREAM TOPICAL
Status: DISCONTINUED | OUTPATIENT
Start: 2021-03-18 | End: 2021-03-19 | Stop reason: HOSPADM

## 2021-03-18 RX ORDER — CEFAZOLIN SODIUM 2 G/100ML
2 INJECTION, SOLUTION INTRAVENOUS SEE ADMIN INSTRUCTIONS
Status: DISCONTINUED | OUTPATIENT
Start: 2021-03-18 | End: 2021-03-19 | Stop reason: HOSPADM

## 2021-03-18 RX ORDER — LABETALOL HYDROCHLORIDE 5 MG/ML
10 INJECTION, SOLUTION INTRAVENOUS
Status: DISCONTINUED | OUTPATIENT
Start: 2021-03-18 | End: 2021-03-19 | Stop reason: HOSPADM

## 2021-03-18 RX ORDER — CEFAZOLIN SODIUM 2 G/100ML
2 INJECTION, SOLUTION INTRAVENOUS
Status: DISCONTINUED | OUTPATIENT
Start: 2021-03-18 | End: 2021-03-19 | Stop reason: HOSPADM

## 2021-03-18 RX ORDER — FENTANYL CITRATE 50 UG/ML
INJECTION, SOLUTION INTRAMUSCULAR; INTRAVENOUS PRN
Status: DISCONTINUED | OUTPATIENT
Start: 2021-03-18 | End: 2021-03-18

## 2021-03-18 RX ORDER — OXYCODONE HYDROCHLORIDE 5 MG/1
5-10 TABLET ORAL EVERY 4 HOURS PRN
Status: DISCONTINUED | OUTPATIENT
Start: 2021-03-18 | End: 2021-03-19 | Stop reason: HOSPADM

## 2021-03-18 RX ORDER — PHENAZOPYRIDINE HYDROCHLORIDE 95 MG/1
190 TABLET ORAL 3 TIMES DAILY
COMMUNITY
End: 2021-05-03

## 2021-03-18 RX ORDER — PROPOFOL 10 MG/ML
INJECTION, EMULSION INTRAVENOUS PRN
Status: DISCONTINUED | OUTPATIENT
Start: 2021-03-18 | End: 2021-03-18

## 2021-03-18 RX ORDER — MEPERIDINE HYDROCHLORIDE 25 MG/ML
12.5 INJECTION INTRAMUSCULAR; INTRAVENOUS; SUBCUTANEOUS
Status: DISCONTINUED | OUTPATIENT
Start: 2021-03-18 | End: 2021-03-19 | Stop reason: HOSPADM

## 2021-03-18 RX ORDER — ONDANSETRON 4 MG/1
4 TABLET, ORALLY DISINTEGRATING ORAL EVERY 30 MIN PRN
Status: DISCONTINUED | OUTPATIENT
Start: 2021-03-18 | End: 2021-03-19 | Stop reason: HOSPADM

## 2021-03-18 RX ORDER — DEXAMETHASONE SODIUM PHOSPHATE 4 MG/ML
4 INJECTION, SOLUTION INTRA-ARTICULAR; INTRALESIONAL; INTRAMUSCULAR; INTRAVENOUS; SOFT TISSUE EVERY 10 MIN PRN
Status: DISCONTINUED | OUTPATIENT
Start: 2021-03-18 | End: 2021-03-19 | Stop reason: HOSPADM

## 2021-03-18 RX ORDER — ACETAMINOPHEN 325 MG/1
975 TABLET ORAL ONCE
Status: COMPLETED | OUTPATIENT
Start: 2021-03-18 | End: 2021-03-18

## 2021-03-18 RX ORDER — SODIUM CHLORIDE, SODIUM LACTATE, POTASSIUM CHLORIDE, CALCIUM CHLORIDE 600; 310; 30; 20 MG/100ML; MG/100ML; MG/100ML; MG/100ML
INJECTION, SOLUTION INTRAVENOUS CONTINUOUS
Status: DISCONTINUED | OUTPATIENT
Start: 2021-03-18 | End: 2021-03-19 | Stop reason: HOSPADM

## 2021-03-18 RX ORDER — DEXAMETHASONE SODIUM PHOSPHATE 4 MG/ML
INJECTION, SOLUTION INTRA-ARTICULAR; INTRALESIONAL; INTRAMUSCULAR; INTRAVENOUS; SOFT TISSUE PRN
Status: DISCONTINUED | OUTPATIENT
Start: 2021-03-18 | End: 2021-03-18

## 2021-03-18 RX ORDER — PROPOFOL 10 MG/ML
INJECTION, EMULSION INTRAVENOUS CONTINUOUS PRN
Status: DISCONTINUED | OUTPATIENT
Start: 2021-03-18 | End: 2021-03-18

## 2021-03-18 RX ORDER — ALBUTEROL SULFATE 0.83 MG/ML
2.5 SOLUTION RESPIRATORY (INHALATION) EVERY 4 HOURS PRN
Status: DISCONTINUED | OUTPATIENT
Start: 2021-03-18 | End: 2021-03-19 | Stop reason: HOSPADM

## 2021-03-18 RX ORDER — ONDANSETRON 2 MG/ML
4 INJECTION INTRAMUSCULAR; INTRAVENOUS EVERY 30 MIN PRN
Status: DISCONTINUED | OUTPATIENT
Start: 2021-03-18 | End: 2021-03-19 | Stop reason: HOSPADM

## 2021-03-18 RX ADMIN — CEFAZOLIN SODIUM 2 G: 2 INJECTION, SOLUTION INTRAVENOUS at 13:59

## 2021-03-18 RX ADMIN — LIDOCAINE HYDROCHLORIDE 60 MG: 20 INJECTION, SOLUTION INFILTRATION; PERINEURAL at 13:55

## 2021-03-18 RX ADMIN — OXYCODONE HYDROCHLORIDE 5 MG: 5 TABLET ORAL at 15:05

## 2021-03-18 RX ADMIN — PROPOFOL 150 MG: 10 INJECTION, EMULSION INTRAVENOUS at 13:55

## 2021-03-18 RX ADMIN — FUROSEMIDE 10 MG: 10 INJECTION INTRAMUSCULAR; INTRAVENOUS at 14:48

## 2021-03-18 RX ADMIN — SODIUM CHLORIDE, SODIUM LACTATE, POTASSIUM CHLORIDE, CALCIUM CHLORIDE: 600; 310; 30; 20 INJECTION, SOLUTION INTRAVENOUS at 13:52

## 2021-03-18 RX ADMIN — PROPOFOL 100 MCG/KG/MIN: 10 INJECTION, EMULSION INTRAVENOUS at 13:55

## 2021-03-18 RX ADMIN — FENTANYL CITRATE 50 MCG: 50 INJECTION, SOLUTION INTRAMUSCULAR; INTRAVENOUS at 13:55

## 2021-03-18 RX ADMIN — ACETAMINOPHEN 975 MG: 325 TABLET ORAL at 12:45

## 2021-03-18 RX ADMIN — OXYCODONE HYDROCHLORIDE 5 MG: 5 TABLET ORAL at 15:37

## 2021-03-18 RX ADMIN — ONDANSETRON 4 MG: 2 INJECTION INTRAMUSCULAR; INTRAVENOUS at 13:59

## 2021-03-18 RX ADMIN — SODIUM CHLORIDE, SODIUM LACTATE, POTASSIUM CHLORIDE, CALCIUM CHLORIDE: 600; 310; 30; 20 INJECTION, SOLUTION INTRAVENOUS at 13:01

## 2021-03-18 RX ADMIN — DEXAMETHASONE SODIUM PHOSPHATE 4 MG: 4 INJECTION, SOLUTION INTRA-ARTICULAR; INTRALESIONAL; INTRAMUSCULAR; INTRAVENOUS; SOFT TISSUE at 13:57

## 2021-03-18 NOTE — ANESTHESIA PROCEDURE NOTES
Airway       Patient location during procedure: OR  Staff -        Performed By: CRNAIndications and Patient Condition       Indications for airway management: lorrie-procedural       Induction type:intravenous       Mask difficulty assessment: 1 - vent by mask    Final Airway Details       Final airway type: supraglottic airway    Supraglottic Airway Details        Type: LMA       Brand: LMA Unique       LMA size: 4    Post intubation assessment        Placement verified by: capnometry, equal breath sounds and chest rise        Number of attempts at approach: 1       Number of other approaches attempted: 0       Secured with: plastic tape       Ease of procedure: easy       Dentition: Intact

## 2021-03-18 NOTE — ANESTHESIA CARE TRANSFER NOTE
Patient: Michaela Dorman    Procedure(s):  CYSTOSCOPY, WITH TRANSURETHRAL RESECTION BLADDER TUMOR  CYSTOSCOPY, WITH RETROGRADE PYELOGRAM    Diagnosis: Personal history of malignant neoplasm of bladder [Z85.51]  Diagnosis Additional Information: No value filed.    Anesthesia Type:   General     Note:    Oropharynx: oropharynx clear of all foreign objects  Level of Consciousness: awake  Oxygen Supplementation: nasal cannula    Independent Airway: airway patency satisfactory and stable  Dentition: dentition changed  Vital Signs Stable: post-procedure vital signs reviewed and stable  Report to RN Given: handoff report given  Patient transferred to: PACU    Handoff Report: Identifed the Patient, Identified the Reponsible Provider, Reviewed the pertinent medical history, Discussed the surgical course, Reviewed Intra-OP anesthesia mangement and issues during anesthesia, Set expectations for post-procedure period and Allowed opportunity for questions and acknowledgement of understanding      Vitals: (Last set prior to Anesthesia Care Transfer)  CRNA VITALS  3/18/2021 1421 - 3/18/2021 1457      3/18/2021             Pulse:  89    SpO2:  97 %    Resp Rate (observed):  10        Electronically Signed By: LISA Zheng CRNA  March 18, 2021  2:57 PM

## 2021-03-18 NOTE — DISCHARGE INSTRUCTIONS
POSTOPERATIVE INSTRUCTIONS    Diagnosis-------------------------------   Bladder cancer    Procedure-------------------------------  Procedure(s) (LRB):  CYSTOSCOPY, WITH TRANSURETHRAL RESECTION BLADDER TUMOR (N/A)  CYSTOSCOPY, WITH RETROGRADE PYELOGRAM (Bilateral)      Findings--------------------------------  Cystoscopy with evidence of an abnormal left trigone just medial to the left ureteral orifice.  The left ureteral orifice noted to be emanating blood-tinged urine.  No clear scar from the prior bladder biopsy noted, however some abnormal mucosa noted at the anterior bladder dome which was resected.  Bilateral retrograde pyelograms with no obvious filling defects.    Home-going instructions-----------------         Activity Limitation:     - No driving or operating heavy machinery while on narcotic pain medication.     FOLLOW THESE INSTRUCTIONS AS INDICATED BELOW:  - Observe operative area for signs of excessive bleeding.  - You may shower.  - Increase fluid intake to promote clear urine.  - Resume usual diet as tolerated    What to expect while recovering-----------  - You may experience some intermittent bleeding that makes your urine pink or cherry colored. This is normal.  - However, if you are unable to urinate, passing large amount of clots, have raj blood in your urine, or have a temperature >101 degrees, call the urology nurse on call, or present to your nearest emergency department.  - You are encouraged to walk daily, and have no activity restrictions.     Discharge Medications/instructions:     - Take Tylenol 1000mg every 6 hours for pain      Questions/concerns------------------------  [facility]: [phone]    Future appointments  You will be contacted to arrange follow up for pathology discussion next week.     Girish Jack MD       [facility]   Adult Discharge Orders & Instructions     For 24 hours after surgery    1. Get plenty of rest.  A responsible  adult must stay with you for at least 24 hours after you leave the hospital.   2. Do not drive or use heavy equipment.  If you have weakness or tingling, don't drive or use heavy equipment until this feeling goes away.  3. Do not drink alcohol.  4. Avoid strenuous or risky activities.  Ask for help when climbing stairs.   5. You may feel lightheaded.  IF so, sit for a few minutes before standing.  Have someone help you get up.   6. If you have nausea (feel sick to your stomach): Drink only clear liquids such as apple juice, ginger ale, broth or 7-Up.  Rest may also help.  Be sure to drink enough fluids.  Move to a regular diet as you feel able.  7. You may have a slight fever. Call the doctor if your fever is over 100 F (37.7 C) (taken under the tongue) or lasts longer than 24 hours.  8. You may have a dry mouth, a sore throat, muscle aches or trouble sleeping.  These should go away after 24 hours.  9. Do not make important or legal decisions.     Call your doctor for any of the followin.  Signs of infection (fever, growing tenderness at the surgery site, a large amount of drainage or bleeding, severe pain, foul-smelling drainage, redness, swelling).    2. It has been over 8 to 10 hours since surgery and you are still not able to urinate (pass water).    3.  Headache for over 24 hours.                  4. Signs of Covid-19 infection (temperature over 100 degrees, shortness of breath, cough, loss of taste/smell, generalized body aches, persistent headache,                  chills, sore throat, nausea/vomiting/diarrhea).    To contact a doctor, call       You had tylenol at 1245    You had oxycodone at 3 pm

## 2021-03-18 NOTE — OP NOTE
OPERATIVE REPORT  DATE OF SURGERY: 03/18/21  LOCATION OF SURGERY: [facility]  PREOPERATIVE DIAGNOSIS:  (C67.9) Malignant neoplasm of urinary bladder, unspecified site (H)  (primary encounter diagnosis)  (Z85.51) Personal history of malignant neoplasm of bladder  POSTOPERATIVE DIAGNOSIS: (C67.9) Malignant neoplasm of urinary bladder, unspecified site (H)  (primary encounter diagnosis)  (Z85.51) Personal history of malignant neoplasm of bladder    START TIME: 2:11 PM  END TIME: 2:45 PM  PROCEDURE PERFORMED:   1. Cystoscopy  2. Bilateral retrograde pyelograms  3. Transurethral resection of bladder tumor (TURBT) - medium 2-5cm     STAFF SURGEON: Girish Jack MD  ANESTHESIA: General.   ESTIMATED BLOOD LOSS: 2 mL.   DRAINS AND TUBES: 16fr watkins catheter  COMPLICATIONS: None.   DISPOSITION: PACU.   SPECIMENS OBTAINED:   ID Type Source Tests Collected by Time Destination   1 : left renal pelvis urine for cytology Urine Kidney, Left CYTOLOGY NON GYN Girish Jack MD 3/18/2021  2:21 PM    A : anterior bladder wall Tissue Bladder SURGICAL PATHOLOGY EXAM Girish Jack MD 3/18/2021  2:36 PM    B : Left Trigone Tissue Bladder SURGICAL PATHOLOGY EXAM Girish Jack MD 3/18/2021  2:40 PM      SIGNIFICANT FINDINGS: Cystoscopy with evidence of an abnormal left trigone just medial to the left ureteral orifice.  The left ureteral orifice noted to be emanating blood-tinged urine.  No clear scar from the prior bladder biopsy noted, however some abnormal mucosa noted at the anterior bladder dome which was resected.  Bilateral retrograde pyelograms with no obvious filling defects.     HISTORY OF PRESENT ILLNESS: Michaela Dorman is a 70 year old female with history of frequent dysuria and UTIs who is found to have a new diagnosis of high-grade urothelial carcinoma on a office bladder biopsy with Dr. Shaw on 2/22/2021.  She presents today for a restaging TURBT and bilateral retrograde pyelograms.    OPERATION PERFORMED:    Informed consent was obtained and the patient was brought to the operating room where general anesthesia was induced. The patient was given appropriate preoperative antibiotics and positioned supine. The patient was then repositioned in dorsal lithotomy with all pressure points padded. We then performed a timeout, verifying the correct patient's site and procedure to be performed.    A 22 Citizen of the [place] cystoscope was inserted atraumatically into the bladder.  Complete cystoscopy was performed with the 30 and 70 degree lenses.  Of note there was initial gross hematuria noted on entry into the bladder.  There was an abnormal trigone just to the medial aspect of the left ureter which also appeared friable.  The left ureter was identified and noted to be emanating some blood-tinged urine.  The right ureteral orifice was identified and noted to be normal.  There is no clear scar from the office biopsy and fulguration.  There was some abnormal mucosa noted at the anterior dome of the bladder.  The right ureteral orifice was identified and cannulated with a 0.035 Glidewire and a 5 Citizen of the [place] open-ended catheter was advanced up to the renal pelvis.  Retrograde pyelogram was performed with withdrawal technique 2 for complete visualization of the entire collecting system with no evidence of filling defect.  Her ureter was noted to be slightly medialized to likely to her prior spine orthopedic surgery.  Her left ureteral orifice was identified and was cannulated with the Glidewire.  This was challenging due to the abnormality of her left trigone.  The 5 Citizen of the [place] open-ended catheter was able to advance over the wire up to the renal pelvis and the wire was removed.  A renal pelvis urine wash was obtained for cytology.  Note very little fluid was obtained for cytology.  Retrograde pyelogram was then performed with no evidence of a filling defect within the left collecting system.  The cystoscope was removed and a 26 Citizen of the [place] continuous-flow  resectoscope was inserted into the bladder.  The anterior bladder wall was resected and this area was small, about 1 to 2 cm.  The left trigone was then resected with care not to injure the left ureteral orifice.  These tissue samples were sent separately.  The left ureteral orifice was noted to be effluxing clear urine at the end of the case.  Hemostasis was ensured and noted to be excellent on a low pressure system and the cystoscope was removed.  A 16 La[place] Quinones catheter was placed in the bladder and she received 10 mg IV Lasix.  She was emerged from anesthesia and taken to the PACU in stable condition.    Girish Jack MD   Urology  [facility]  Clinic Phone [phone]

## 2021-03-18 NOTE — ANESTHESIA POSTPROCEDURE EVALUATION
Patient: Michaela Dorman    Procedure(s):  CYSTOSCOPY, WITH TRANSURETHRAL RESECTION BLADDER TUMOR  CYSTOSCOPY, WITH RETROGRADE PYELOGRAM    Diagnosis:Personal history of malignant neoplasm of bladder [Z85.51]  Diagnosis Additional Information: No value filed.    Anesthesia Type:  General    Note:  Disposition: Outpatient   Postop Pain Control: Uneventful            Sign Out: Well controlled pain   PONV:    Neuro/Psych: Uneventful            Sign Out: Acceptable/Baseline neuro status   Airway/Respiratory: Uneventful            Sign Out: Acceptable/Baseline resp. status   CV/Hemodynamics: Uneventful            Sign Out: Acceptable CV status   Other NRE:    DID A NON-ROUTINE EVENT OCCUR?          Last vitals:  Vitals:    03/18/21 1238 03/18/21 1500 03/18/21 1515   BP: (!) 140/73 135/62 139/88   Pulse:  67 70   Resp: 18 19 14   Temp: 98.5  F (36.9  C) 98.2  F (36.8  C)    SpO2: 95% 97% 95%       Last vitals prior to Anesthesia Care Transfer:  CRNA VITALS  3/18/2021 1421 - 3/18/2021 1521      3/18/2021             Pulse:  89    SpO2:  97 %    Resp Rate (observed):  10          Electronically Signed By: Missael Booth MD  March 18, 2021  3:24 PM

## 2021-03-19 ENCOUNTER — MYC MEDICAL ADVICE (OUTPATIENT)
Dept: UROLOGY | Facility: CLINIC | Age: 71
End: 2021-03-19

## 2021-03-19 NOTE — CONFIDENTIAL NOTE
Girish Jack MD  You; [facility] [place] 52 minutes ago (2:52 PM)     Make sure she is taking her oxybutynin 5mg XL (Ditropan XL) - I think she was on TID. She can also add Ibuprofen if her urine is clear. And continue the Azo.     Thanks,   Girish Jack M.D.      Called and spoke to patient who is aware of the above information. Patient reports that the oxybutynin medication should be taken off of her list as she cannot tolerate that medication. Per patient, she tried the oxybutynin twice and also the patch, but it caused nausea and fogginess of her mind. Informed patient that it would be added as an allergy. Per patient, her urine is clear, but orange so she will try the ibuprofen and continue the azo. Informed patient that if pain is severe or uncontrolled at home or if she develops fever to seek care at an urgent care over the weekend. Patient verbalized understanding.    Iwona Meyer RN, BSN

## 2021-03-19 NOTE — CONFIDENTIAL NOTE
"Called and spoke to patient regarding my chart message. Patient reports bladder pain rated 7/10. Patient reports that she has been taking tylenol which has not been too helpful for her pain. Patient stated, \"It really burns when I go to the bathroom and my bladder feels like it is in a constant cramp.\" Patient reports that the cramps feel similar to a bladder spasm but not quite as intense. Per patient, the pain is better when she is laying down and worse when up moving and walking around. Per patient, she is taking azo and stated, \"I don't even know why I am taking it because it isn't helping much. Per patient, urine is orange from the azo but yesterday urine was yellow after surgery. Patient reports that she is eating and drinking well, denies nausea, and no reports of fever or chills. Informed patient that a message will be sent to Dr. Jack with request to review and advise. Patient aware that she will be contacted with additional information.     Patient scheduled for post op telephone visit on 3/23/21 at 9:45am as she may be traveling on Thursday. Informed patient that the appointment may need to be postponed if the pathology results are not back yet.    Patient questions regarding the surgery were answered.     Iwona Meyer RN, BSN    "

## 2021-03-22 LAB — COPATH REPORT: NORMAL

## 2021-03-23 ENCOUNTER — MYC MEDICAL ADVICE (OUTPATIENT)
Dept: UROLOGY | Facility: CLINIC | Age: 71
End: 2021-03-23

## 2021-03-23 ENCOUNTER — MYC MEDICAL ADVICE (OUTPATIENT)
Dept: FAMILY MEDICINE | Facility: OTHER | Age: 71
End: 2021-03-23

## 2021-03-23 ENCOUNTER — VIRTUAL VISIT (OUTPATIENT)
Dept: UROLOGY | Facility: CLINIC | Age: 71
End: 2021-03-23
Payer: COMMERCIAL

## 2021-03-23 DIAGNOSIS — C67.9 UROTHELIAL CARCINOMA OF BLADDER WITH INVASION OF MUSCLE (H): Primary | ICD-10-CM

## 2021-03-23 DIAGNOSIS — Z85.51 PERSONAL HISTORY OF MALIGNANT NEOPLASM OF BLADDER: ICD-10-CM

## 2021-03-23 DIAGNOSIS — G89.4 CHRONIC PAIN SYNDROME: Primary | ICD-10-CM

## 2021-03-23 PROCEDURE — 99214 OFFICE O/P EST MOD 30 MIN: CPT | Mod: 95 | Performed by: UROLOGY

## 2021-03-23 RX ORDER — OXYCODONE AND ACETAMINOPHEN 5; 325 MG/1; MG/1
1 TABLET ORAL EVERY 6 HOURS PRN
Qty: 60 TABLET | Refills: 0 | Status: SHIPPED | OUTPATIENT
Start: 2021-03-23 | End: 2021-04-07

## 2021-03-23 NOTE — Clinical Note
Hi Mr. Aliceacaroline and Dr. Shaw,    I followed up with Michaela today to discuss your pathology results from her TURBT.  Unfortunately this did demonstrate muscle invasive high-grade bladder cancer.  We will obtain complete staging imaging as well as a referral to medical oncology and a referral to my partner Dr. Robles for discussion of a cystectomy.    Thanks,   Girish Jack M.D.  Cell: [phone]

## 2021-03-23 NOTE — PROGRESS NOTES
[facility]  CHIEF COMPLAINT   It was my pleasure to see Mcihaela Dorman who is a 70 year old female for follow-up of newly diagnosed bladder cancer.      HPI   Michaela Dorman is a very pleasant 70 year old female.  She previously followed with Dr. hSaw for symptoms of recurrent UTIs, though majority of her urine cultures were negative.  She underwent cystoscopy on 2/22/2021 which was notable for a small papilla at the dome which appeared more consistent with inflammation but a biopsy sample was taken from the dome area.  Pathology from this came back with high-grade urothelial carcinoma and her urine cytology from the day was also suspicious for high-grade urothelial carcinoma.    3/2/21:  Referred to me for further discussion and management of her new diagnosis of high-grade urothelial carcinoma  She continues to have fairly constant dysuria    Quit smoking 40 yeas ago  Smoked from age [age] - with a ~20pack/year history     3/18/21:  Transurethral resection of bladder tumor (TURBT)     TODAY:  Doing well after surgery   She notes improved voiding symptoms following surgery    PHYSICAL EXAM  Patient is a 70 year old  female   Vitals: not currently breastfeeding.  There is no height or weight on file to calculate BMI.  General: No evidence of distress   Lungs: normal respiratory effort  Neuro: Alert, oriented, speech and mentation normal  Psych: affect and mood normal      Creatinine   Date Value Ref Range Status   02/03/2021 1.12 (H) 0.52 - 1.04 mg/dL Final   01/21/2021 0.96 0.52 - 1.04 mg/dL Final   01/12/2021 1.20 (H) 0.52 - 1.04 mg/dL Final   12/24/2020 1.20 (H) 0.52 - 1.04 mg/dL Final   12/11/2020 1.19 (H) 0.52 - 1.04 mg/dL Final     GFR Estimate   Date Value Ref Range Status   02/03/2021 49 (L) >60 mL/min/[1.73_m2] Final     Comment:     Non  GFR Calc  Starting 12/18/2018, serum creatinine based estimated GFR (eGFR) will be   calculated using the Chronic Kidney Disease Epidemiology  Collaboration   (CKD-EPI) equation.     01/21/2021 59 (L) >60 mL/min/[1.73_m2] Final     Comment:     Non  GFR Calc  Starting 12/18/2018, serum creatinine based estimated GFR (eGFR) will be   calculated using the Chronic Kidney Disease Epidemiology Collaboration   (CKD-EPI) equation.     01/12/2021 45 (L) >60 mL/min/[1.73_m2] Final     Comment:     Non  GFR Calc  Starting 12/18/2018, serum creatinine based estimated GFR (eGFR) will be   calculated using the Chronic Kidney Disease Epidemiology Collaboration   (CKD-EPI) equation.       GFR Estimate If Black   Date Value Ref Range Status   02/03/2021 57 (L) >60 mL/min/[1.73_m2] Final     Comment:      GFR Calc  Starting 12/18/2018, serum creatinine based estimated GFR (eGFR) will be   calculated using the Chronic Kidney Disease Epidemiology Collaboration   (CKD-EPI) equation.     01/21/2021 68 >60 mL/min/[1.73_m2] Final     Comment:      GFR Calc  Starting 12/18/2018, serum creatinine based estimated GFR (eGFR) will be   calculated using the Chronic Kidney Disease Epidemiology Collaboration   (CKD-EPI) equation.     01/12/2021 53 (L) >60 mL/min/[1.73_m2] Final     Comment:      GFR Calc  Starting 12/18/2018, serum creatinine based estimated GFR (eGFR) will be   calculated using the Chronic Kidney Disease Epidemiology Collaboration   (CKD-EPI) equation.         CYTOLOGIC INTERPRETATION:    Urine, voided:   - Suspicious for high-grade urothelial carcinoma       PATHOLOGY  Bladder Biopsy 2/22/21:  FINAL DIAGNOSIS:   Bladder biopsy, dome:   - Superficial fragments of high grade urothelial carcinoma   - No lamina propria or muscularis propria identified     Transurethral resection of bladder tumor (TURBT) 3/18/21:  FINAL DIAGNOSIS:   A. Bladder, anterior wall, biopsy:   - Non-invasive low grade papillary urothelial carcinoma     B. Bladder, left trigone, tumor, TURBT:   - Invasive high-grade  urothelial carcinoma   - Tumor extensively invades muscularis propria     IMAGING:  All pertinent imaging reviewed:    All imaging studies reviewed by me.  I personally reviewed these imaging films.  A formal report from radiology will follow.    CT ABD/PEL:  FINDINGS:   LOWER CHEST: Normal.     HEPATOBILIARY: Normal.     PANCREAS: Normal.     SPLEEN: Normal.     ADRENAL GLANDS: Normal.     KIDNEYS/BLADDER: No renal stones or hydronephrosis. The ureters are  not dilated. Urinary bladder is unremarkable.     BOWEL: Moderate volume of retained colonic stool. There are a few  scattered colonic diverticula but no evidence of diverticulitis. The  appendix is normal.     LYMPH NODES: Normal.     VASCULATURE: Significant aortic atherosclerosis, particularly at the  origins of the visceral branches. Atherosclerosis also extends into  the peripheral arterial vasculature.     PELVIC ORGANS: Unremarkable.     OTHER: No ascites.     MUSCULOSKELETAL: Multilevel lumbar fusion hardware. No destructive  bone lesions.                                                                   IMPRESSION: No urinary tract calculi or hydronephrosis.    ASSESSMENT and PLAN  70-year-old female with about 1 year history of frequent dysuria and new diagnosis of high-grade T2 urothelial carcinoma    High-grade muscle invasive urothelial carcinoma      We had a long discussion about muscle invasive bladder cancer.  We discussed the fact that once urothelial cancer invades the bladder muscle layer it can potentially invade the blood vessels and lymphatic channels and spread throughout the body.  Once urothelial cancer spreads to distant organs, the opportunity for cure is rare, so we have the best shot for cure with aggressive surgical intervention.     We discussed radical cystectomy and the potential complications associated with it, including a 2-3% perioperative mortality risk, and the risk of complications is about 60%-70%. Potential  complications include, but are not limited to MI, stroke, DVT/PE, bleeding, infection, injury to surrounding structures, urine leak, and various urinary diversion-related complications. We also discussed that about 20-30% of patients will need to go to a rehab facility after discharge from the hospital.  Though most patients get through this operation, there is often a significant delay in regaining appetite and sometimes takes several months to feel back to normal.    We also briefly discussed the use of radiation combined with chemotherapy, and while this is used in certain circumstances, it is not commonly used in the [place] secondary to the perceived inferiority to cystectomy.  We also discussed the various types of urinary diversion including ileal conduit, [place] Pouch and ileal neobladder.  The various pros and cons of each type of urinary diversion was discussed and all questions were answered.  Prospective quality of life data is lacking that compares the various diversion types, but retrospective data suggest the differences are small by diversion type.    We discussed the use of neoadjuvant chemotherapy in the setting of muscle invasive bladder cancer and that the combined analyses of these studies indicate an absolute survival advantage at 5 years.  We also discussed the role of adjuvant chemotherapy and though the data is not as strong, it appears to be beneficial in this setting as well.  We reviewed that she does have borderline CKD stage III and that she may not be a candidate for all of our traditional chemotherapy agents.    Plan:  -CT chest abdomen pelvis ordered for completion of her staging  -Referral to medical oncology, Dr. Cuellar  -Referral to Dr. Robles for further surgical discussion    Chart documentation with Dragon Voice recognition Software. Although reviewed after completion, some words and grammatical errors may remain.    Time spent: 20 minutes spent on the date of the encounter doing  chart review, history and exam, documentation and further activities as noted above.  Phone call duration: 15 minutes    Girish Jack MD   Urology  [facility]  [facility] Phone: [phone]  [facility] Phone: [phone]      Michaela is a 70 year old who is being evaluated via a billable telephone visit.      What phone number would you like to be contacted at? [phone]  How would you like to obtain your AVS? [facility]

## 2021-03-24 ENCOUNTER — MYC MEDICAL ADVICE (OUTPATIENT)
Dept: UROLOGY | Facility: CLINIC | Age: 71
End: 2021-03-24

## 2021-03-24 ENCOUNTER — HOSPITAL ENCOUNTER (OUTPATIENT)
Dept: CT IMAGING | Facility: CLINIC | Age: 71
Discharge: HOME | End: 2021-03-24
Attending: UROLOGY | Admitting: UROLOGY
Payer: MEDICARE

## 2021-03-24 DIAGNOSIS — Z85.51 PERSONAL HISTORY OF MALIGNANT NEOPLASM OF BLADDER: ICD-10-CM

## 2021-03-24 DIAGNOSIS — C67.9 UROTHELIAL CARCINOMA OF BLADDER WITH INVASION OF MUSCLE (H): ICD-10-CM

## 2021-03-24 LAB
CREAT BLD-MCNC: 1.1 MG/DL (ref 0.52–1.04)
GFR SERPL CREATININE-BSD FRML MDRD: 49 ML/MIN/{1.73_M2}

## 2021-03-24 PROCEDURE — 250N000011 HC RX IP 250 OP 636: Performed by: UROLOGY

## 2021-03-24 PROCEDURE — 71260 CT THORAX DX C+: CPT

## 2021-03-24 PROCEDURE — 250N000009 HC RX 250: Performed by: UROLOGY

## 2021-03-24 PROCEDURE — 82565 ASSAY OF CREATININE: CPT

## 2021-03-24 RX ORDER — IOPAMIDOL 755 MG/ML
100 INJECTION, SOLUTION INTRAVASCULAR ONCE
Status: COMPLETED | OUTPATIENT
Start: 2021-03-24 | End: 2021-03-24

## 2021-03-24 RX ADMIN — SODIUM CHLORIDE 60 ML: 9 INJECTION, SOLUTION INTRAVENOUS at 08:05

## 2021-03-24 RX ADMIN — IOPAMIDOL 70 ML: 755 INJECTION, SOLUTION INTRAVENOUS at 08:04

## 2021-03-24 NOTE — TELEPHONE ENCOUNTER
RECORDS STATUS - ALL OTHER DIAGNOSIS      RECORDS RECEIVED FROM: [facility]   DATE RECEIVED: 3/25/2021   NOTES STATUS DETAILS   OFFICE NOTE from referring provider Complete Girish Jack MD   OFFICE NOTE from medical oncologist N/A Breast Cancer notes in EPIC   DISCHARGE SUMMARY from hospital N/A    DISCHARGE REPORT from the ER     OPERATIVE REPORT COmplete See Bladder Biopsy in [facility] 2/22/2021   MEDICATION LIST Complete [facility]   CLINICAL TRIAL TREATMENTS TO DATE     LABS     PATHOLOGY REPORTS Complete 2/22/2021   A. Bladder, anterior wall, biopsy:   - Non-invasive low grade papillary urothelial carcinoma     B. Bladder, left trigone, tumor, TURBT:   - Invasive high-grade urothelial carcinoma   - Tumor extensively invades muscularis propria   ANYTHING RELATED TO DIAGNOSIS Complete Labs last updated on 3/24/2021   GENONOMIC TESTING     TYPE:     IMAGING (NEED IMAGES & REPORT)     CT SCANS Complete CT Chest Abdomen Pelvis 3/24/2021    CT Abdomen Pelvis 2/22/2021    CTA Head Neck 8/31/2020     CT Head 8/31/2020   MRI     MAMMO     ULTRASOUND     PET

## 2021-03-25 ENCOUNTER — PRE VISIT (OUTPATIENT)
Dept: ONCOLOGY | Facility: CLINIC | Age: 71
End: 2021-03-25

## 2021-03-25 ENCOUNTER — TELEPHONE (OUTPATIENT)
Dept: ONCOLOGY | Facility: CLINIC | Age: 71
End: 2021-03-25

## 2021-03-25 ENCOUNTER — VIRTUAL VISIT (OUTPATIENT)
Dept: ONCOLOGY | Facility: CLINIC | Age: 71
End: 2021-03-25
Attending: UROLOGY
Payer: COMMERCIAL

## 2021-03-25 VITALS — BODY MASS INDEX: 20.72 KG/M2 | WEIGHT: 132 LBS | HEIGHT: 67 IN

## 2021-03-25 DIAGNOSIS — C67.9 UROTHELIAL CARCINOMA OF BLADDER WITH INVASION OF MUSCLE (H): ICD-10-CM

## 2021-03-25 DIAGNOSIS — Z85.51 PERSONAL HISTORY OF MALIGNANT NEOPLASM OF BLADDER: ICD-10-CM

## 2021-03-25 PROCEDURE — 99205 OFFICE O/P NEW HI 60 MIN: CPT | Mod: 95 | Performed by: INTERNAL MEDICINE

## 2021-03-25 PROCEDURE — 99417 PROLNG OP E/M EACH 15 MIN: CPT | Mod: 95 | Performed by: INTERNAL MEDICINE

## 2021-03-25 ASSESSMENT — MIFFLIN-ST. JEOR: SCORE: 1138.44

## 2021-03-25 ASSESSMENT — PAIN SCALES - GENERAL: PAINLEVEL: MODERATE PAIN (4)

## 2021-03-25 NOTE — TELEPHONE ENCOUNTER
Left message for pt to call and schedule an in clinic or virtual visit on 4/7/21 at 7:30AM with Dr. Robles for consult on bladder cancer. Okay to schedule outside of template per Britta.

## 2021-03-25 NOTE — PROGRESS NOTES
Michaela is a 70 year old who is being evaluated via a billable video visit.      How would you like to obtain your AVS? MyChart  If the video visit is dropped, the invitation should be resent by: Text to cell phone: [phone]  Will anyone else be joining your video visit? Yes:  Yefri. How would they like to receive their invitation? Send to e-mail at: [email or web address]      Video Start Time:   Video-Visit Details    Type of service:  Video Visit    Video End Time:     Originating Location (pt. Location): Home    Distant Location (provider location):  [facility] [place] [facility]     Platform used for Video Visit: Vee Ma CMA

## 2021-03-25 NOTE — LETTER
3/25/2021         RE: Michaela Dorman  [address]  [facility] [address]        Dear Colleague,    Thank you for referring your patient, Michaela Dorman, to the [facility]. Please see a copy of my visit note below.    Michaela is a 70 year old who is being evaluated via a billable video visit.      How would you like to obtain your AVS? MyChart  If the video visit is dropped, the invitation should be resent by: Text to cell phone: [phone]  Will anyone else be joining your video visit? Yes:  Yefri. How would they like to receive their invitation? Send to e-mail at: [email or web address]      Video Start Time:   Video-Visit Details    Type of service:  Video Visit    Video End Time:     Originating Location (pt. Location): Home    Distant Location (provider location):  [facility]     Platform used for Video Visit: Vee Ma CMA        [facility]    NEW PATIENT VISIT NOTE    PATIENT NAME: Michaela Dorman MRN # [id number]  DATE OF VISIT: March 25, 2021 YOB: 1950    REFERRING PROVIDER: Girish Jack MD  [address] [place] [id number]    CANCER TYPE: Urothelial cancer from bladder  STAGE: cT2 disease     TREATMENT SUMMARY:  2/22/21 - cystoscopy with biopsy of bladder dome revealed high grade urothelial cancer without lamina propria or muscularis   Urine cytology same day was suspicious for high-grade urothelial carcinoma   3/18/21 - Bladder, left trigone, tumor, trans urethral resection of bladder tumor (TURBT) revealed invasive high-grade urothelial carcinoma   with extensive invasion of muscularis propria  3/24/21 -  CT chest, abdomen and pelvis - mild irregularity of the posterior urinary bladder wall    CURRENT INTERVENTIONS:  Ongoing work up     HISTORY OF PRESENT ILLNESS   Michaela Dorman is 70 year old female who has been referred for muscle invasive bladder  cancer     Michaela was reached over video visit. She notes that she started getting UTI in October 2020. All of her 4-5 cultures were negative. She had urgency and pelvic cramping like having a UTI. She was finally referred to a specialist. She was referred to Urologist. She had cystoscopy which suggested cancer.   She had trans urethral resection of bladder tumor (TURBT) done by Dr. Jack which has established muscle invasive bladder cancer and so she has been referred to Oncology.     She denies any hematuria. Her serial urinalysis always were negative except for the last time.     She had breast cancer in 2016. She had mastectomy, chemotherapy and radiation. She followed up with Dr. Ferguson.   A year ago she had major back surgery.   She needed carotid artery cleaning up done around last October.     She is very active. She rides horse, hunt and fish. She walks 3 miles a day.      PAST MEDICAL HISTORY     Past Medical History:   Diagnosis Date     Carotid stenosis, bilateral L80%, R40% 09/02/2020     Central stenosis of spinal canal 12/01/2017    lumbar      DDD (degenerative disc disease), lumbar 12/01/2017     Depressive disorder, not elsewhere classified      Esophageal reflux      ETOH abuse     in remission     Foraminal stenosis of lumbar region 12/01/2017    Complete lumbar spine left at various degrees and to a lesser extent the right as well.     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     Subdural hematoma (H)           CURRENT OUTPATIENT MEDICATIONS     Current Outpatient Medications   Medication Sig     acetaminophen (TYLENOL) 500 MG tablet Take 1,000 mg by mouth 3 times daily as needed for mild pain (500MG X 2 = 1,000MG)     ALPRAZolam (XANAX) 0.5 MG tablet TAKE 1 TABLET BY MOUTH DAILY AT  BEDTIME AS NEEDED FOR SLEEP     aspirin (ASA) 81 MG chewable tablet Take 1 tablet (81 mg) by mouth daily     atorvastatin (LIPITOR) 20 MG tablet TAKE ONE TABLET BY MOUTH ONCE DAILY     B-12 METHYLCOBALAMIN PO Take 5,000 mcg by mouth every morning     celecoxib (CELEBREX) 200 MG capsule TAKE 1 CAPSULE BY MOUTH TWICE A DAY     cephALEXin (KEFLEX) 250 MG capsule Take 1 capsule (250 mg) by mouth At Bedtime     COMPOUNDED NON-CONTROLLED SUBSTANCE (CMPD RX) - PHARMACY TO MIX COMPOUNDED MEDICATION Estriol 0.1% cream (1mg/gram) in HRT base. Place 1 gram vaginally daily for 2 weeks, then vaginally twice weekly.     diphenhydrAMINE (BENADRYL) 25 MG tablet Take 25 mg by mouth nightly as needed for itching or allergies     escitalopram (LEXAPRO) 10 MG tablet TAKE 1 TABLET BY MOUTH EVERY DAY WITH 20MG TABLETS     escitalopram (LEXAPRO) 20 MG tablet TAKE 1 TABLET BY MOUTH EVERY DAY WITH 20MG TABLETS     Garlic 1000 MG CAPS Take 1,000 mg by mouth every morning     Magnesium Oxide 500 MG TABS Take 500 mg by mouth every morning     omeprazole (PRILOSEC) 20 MG DR capsule Take 20 mg by mouth daily     oxyCODONE-acetaminophen (PERCOCET) 5-325 MG tablet Take 1 tablet by mouth every 6 hours as needed for pain     phenazopyridine (AZO URINARY PAIN RELIEF) 95 MG tablet Take 190 mg by mouth 3 times daily     polyethylene glycol (MIRALAX/GLYCOLAX) powder Take 1 capful by mouth every morning      prochlorperazine (COMPAZINE) 10 MG tablet TAKE 1 TABLET BY MOUTH EVERY 6 HOURS AS NEEDED FOR NAUSEA AND VOMITING     Psyllium (METAMUCIL FIBER PO) Take 2 teaspoonful by mouth every morning (mix in with liquid and drink)     senna-docusate (SENOKOT-S/PERICOLACE) 8.6-50 MG tablet Take 2 tablets by mouth 2 times daily (Patient taking differently: Take 2 tablets by mouth 2 times daily as needed for constipation )     vitamin C (ASCORBIC ACID) 1000 MG TABS Take 1,000 mg by mouth every morning     Vitamin D3 (CHOLECALCIFEROL) 25 mcg (1000 units) tablet  Take by mouth daily     Zinc 30 MG CAPS Take 30 mg by mouth every morning      No current facility-administered medications for this visit.         ALLERGIES      Allergies   Allergen Reactions     No Known Drug Allergies      Oxybutynin      Patient reports symptoms of nausea and mind fogginess        SOCIAL HISTORY   She is  and lives with her . 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     She does not smoke. She smoked from age 16 to age 29 yrs of age. She smoked about 1-2 packs daily during that time. She drinks alcohol - 1-2 drinks after supper. She denies recreational drug use.      FAMILY HISTORY   - 2 sisters had breast cancer (she has 4 sisters); her youngest sister got it at 54 and oldest sister was 78 yrs at the time of breast cancer diagnosis  - brother had sinus cancer and bladder cancer  - both parents were healthy     REVIEW OF SYSTEMS   As above in the HPI, o/w complete 12-point ROS was negative.     PHYSICAL EXAM   B/P: Data Unavailable, T: Data Unavailable, P: Data Unavailable, R: Data Unavailable  @[email or web address](4)@  Wt Readings from Last 3 Encounters:   03/25/21 59.9 kg (132 lb)   03/08/21 62.3 kg (137 lb 6.4 oz)   12/24/20 63.5 kg (140 lb)     GEN: NAD  HEENT: PERRL, EOMI, no icterus, injection or pallor. Oropharynx is clear.  NECK: no cervical or supraclavicular lymphadenopathy  LUNGS: clear bilaterally  CV: regular, no murmurs, rubs, or gallops  ABDOMEN: soft, non-tender, non-distended, normal bowel sounds, no hepatosplenomegaly by percussion or palpation  EXT: warm, well perfused, no edema  NEURO: alert  SKIN: no rashes     LABORATORY AND IMAGING STUDIES     Recent Labs   Lab Test 02/03/21  1511 01/21/21  1001 01/12/21  1400 12/24/20  1104 12/11/20  1034    136 138 136 139   POTASSIUM 4.2 4.3 4.1 4.9 4.6   CHLORIDE 105 104 106 103 108   CO2 26 28 27 28 26   ANIONGAP 4 4 5 5 5   BUN 26 18 22 19 26   CR 1.12* 0.96 1.20* 1.20* 1.19*   GLC 83 86 79 86 95   VANDANA 9.3  9.3 10.1 9.9 9.7     Recent Labs   Lab Test 02/03/21  1511 01/21/21  1001 01/12/21  1400   PHOS 3.3 2.7 3.5     Recent Labs   Lab Test 12/24/20  1104 12/11/20  1034 11/20/20  1151 10/01/20  1139 08/31/20  1241 07/16/20  1051   WBC 7.4 8.4 8.6 9.2 8.1 7.1   HGB 11.1* 11.0* 11.0* 11.3* 13.1 11.5*    236 254 269 269 284   * 106* 103* 101* 101* 99   NEUTROPHIL  --  74.5 69.4 69.3 68.9 70.7     Recent Labs   Lab Test 02/03/21  1511 01/21/21  1001 01/12/21  1400 12/24/20  1104 12/11/20  1034 10/01/20  1139   BILITOTAL  --   --   --  0.3 0.5 0.3   ALKPHOS  --   --   --  42 47 61   ALT  --   --   --  24 28 49   AST  --   --   --  21 30 50*   ALBUMIN 4.1 4.0 4.2 4.2 4.2 4.3     TSH   Date Value Ref Range Status   01/24/2017 1.34 0.40 - 4.00 mU/L Final   08/31/2016 0.39 (L) 0.40 - 4.00 mU/L Final   06/10/2013 0.49 0.4 - 5.0 mU/L Final         Lab Results   Component Value Date    PATH  03/18/2021     Patient Name: NICHOLAS DONNELLY  MR#: [id number]  Specimen #: [id number]  Collected: 3/18/2021  Received: 3/19/2021  Reported: 3/22/2021 15:29  Ordering Phy(s): DANNY HOWE    For improved result formatting, select 'View Enhanced Report Format' under   Linked Documents section.    SPECIMEN(S):  A: Bladder biopsy, anterior bladder wall  B: Bladder biopsy, left trigone    FINAL DIAGNOSIS:  A. Bladder, anterior wall, biopsy:  - Non-invasive low grade papillary urothelial carcinoma    B. Bladder, left trigone, tumor, TURBT:  - Invasive high-grade urothelial carcinoma  - Tumor extensively invades muscularis propria    I have personally reviewed all specimens and/or slides, including the   listed special stains, and used them  with my medical judgement to determine or confirm the final diagnosis.    Electronically signed out by:    Carson Simon M.D., [facility]    CLINICAL HISTORY:  The patient is a 70-year-old woman with history of frequent urinary tract   infections and a newly diagnosed  high-grade urothelial  "carcinoma ([id number]). She underwent restaging   TURBT. Cystoscopy showed an abnormal left  trigone.    GROSS:  A:  The specimen is received in formalin with proper patient   identification, labeled \"anterior bladder wall\".  The specimen consists of one segment of tan soft tissue measuring 1.0 cm   in greatest dimension.  It is  entirely submitted in cassette A1.    B:  The specimen is received in formalin with proper patient   identification, labeled \"left trigone\".  The  specimen consists of four segments of tan cauterized soft tissue ranging   from 0.5-1.2 cm in greatest  dimension.  It is wrapped and entirely submitted in cassette B 1.   (Dictated by: Rebecca CASTANON [facility]  3/19/2021 11:00 AM)    MICROSCOPIC:  Microscopic examination was performed.    The technical component of this testing was completed at the [facility], with the professional component performed   at the [facility], [address] [place] [phone] ([phone])    CPT Codes:  A: 35249-LO5  B: 19571-OQ3    COLLECTION SITE:  Client: [facility]  Location: [facility] (B)    Resident  [email or web address]         Results for orders placed or performed during the hospital encounter of 03/24/21   CT Chest/Abdomen/Pelvis w Contrast    Narrative    CT CHEST/ABDOMEN/PELVIS WITH CONTRAST March 24, 2021 8:18 AM    CLINICAL HISTORY: Bladder cancer, invasive, assess treatment response.  Newly diagnosed muscle invasive bladder cancer. Urothelial carcinoma  of bladder with invasion of muscle (H). Personal history of malignant  neoplasm of bladder.    TECHNIQUE: CT scan of the chest, abdomen, and pelvis was performed  following injection of IV contrast. Multiplanar reformats were  obtained. Dose reduction techniques were used.   CONTRAST: Isovue-370, 70mL    COMPARISON: CT abdomen and pelvis dated 2/22/2021, " CT/PET dated  3/10/2017.    FINDINGS:   LUNGS AND PLEURA: A nodular density medial right minor fissure (image  161 series 5) measures slightly less than 0.3 cm and is of doubtful  clinical significance. Lungs are otherwise clear without other nodule,  mass, infiltrate, effusion, or pneumothorax.    MEDIASTINUM/AXILLAE: There are extensive coronary artery  calcifications and/or stents. Thoracic aortic calcifications are also  noted. No mediastinal, hilar, or axillary lymphadenopathy is  identified. No obvious central pulmonary artery filling defects to  suggest central pulmonary artery embolism. The study was not optimized  for pulmonary artery evaluation. No evidence for aortic dissection.  Left side of the thyroid is hypotrophic. Thyroid is otherwise  unremarkable, as visualized.    HEPATOBILIARY: Normal.    PANCREAS: Normal.    SPLEEN: Normal.    ADRENAL GLANDS: Normal.    KIDNEYS/BLADDER: There is mild fetal lobulation bilaterally. The  kidneys otherwise enhance normally. No hydronephrosis,  nephrolithiasis, hydroureter, or ureteral calculus is identified.  There is mild irregularity of the posterior urinary bladder wall.  Urinary bladder is otherwise mostly decompressed and unremarkable.    BOWEL: There is a moderate amount of stool in the colon. There are a  few scattered diverticula. No pericolonic inflammatory change to  suggest acute diverticulitis. There is questionable mild thickening of  the wall of the cecum. Fluid-filled cecum. Colon is otherwise  unremarkable. Appendix is not well seen. No pericecal inflammatory  change to suggest acute appendicitis. A small tubular structure  extending from the region of cecum (image 270 series 6) could  represent a portion of a normal appendix. Small bowel is grossly of  normal caliber. There is mild thickening of the wall the gastric  antrum and pylorus which could be due to incomplete distention.  Infiltrative or inflammatory process is difficult to entirely  exclude.  Stomach is otherwise mostly decompressed and unremarkable.    PELVIC ORGANS: Uterus and left ovary are normal in appearance. Right  ovary is not well seen. No adnexal masses are identified.    ADDITIONAL FINDINGS: No adenopathy, free fluid, or free air is seen in  the peritoneal cavity. There is extensive nonaneurysmal  atherosclerosis. This includes aorta as well as the origins of the  superior mesenteric artery, bilateral renal arteries and bilateral  common iliac arteries.     MUSCULOSKELETAL: No osseous lesions or acute osseous fractures are  seen. Postoperative changes in the spine cause streak artifact  partially limiting evaluation of the spine. There are degenerative  changes of the hips and in the spine. Right shoulder arthroplasty  causes streak artifact mildly limiting evaluation of the right upper  chest and shoulder.      Impression    IMPRESSION:   1. There is mild irregularity of the posterior urinary bladder wall  which could be the site of the patient's malignancy. Urinary bladder  is otherwise unremarkable. There is a benign normal variant of  bilateral fetal renal lobulation. No other urinary system abnormality  is seen.  2. No definite metastases are identified.  3. Tiny nodular density in the medial right minor fissure measuring  less than 0.3 cm is of doubtful clinical significance.   4. Mild irregularity of the cecum wall could represent a normal  ileocecal valve although an infiltrative process in the wall of the  cecum is difficult to entirely excluded in appropriate setting.  Colonoscopy is recommended if not recently performed. Moderate amount  of stool is otherwise seen throughout the colon.  5. At least moderate atherosclerosis including coronary arteries,  aorta, and major arteries of the abdomen and pelvis as described  above.  6. No other significant abnormalities are identified.    LENA ROME MD     *Note: Due to a large number of results and/or encounters for the  requested time period, some results have not been displayed. A complete set of results can be found in Results Review.         ASSESSMENT    High grade muscle invasive bladder cancer involving the left trigone  Medical comorbidities including chronic kidney disease stage III A, carotid stenosis  Previously treated breast cancer  ECOG PS 0    DISCUSSION   I had a lengthy discussion with Michaela regarding recent diagnosis of bladder cancer. We briefly touched on the fact that cigarette smoking is the most significant cause for bladder cancer, and it is encouraging that Michaela quit smoking several decades ago and only briefly smoked. We focused on the typical disease course, prognosis, and different treatment options.   Michaela has locally advanced disease with a definite area of muscle invasion, high-grade bladder tumor. Despite surgical resection a significant portion of bladder cancer does recur. To decrease this risk of recurrence chemotherapy either prior to cystectomy (jostin-adjuvant) or post resection in the adjuvant setting has been studied. There has been evidence to suggest benefit for patients with high risk disease who received jostin-adjuvant chemotherapy. This addresses micro-metastatic disease at the earliest. This gives an opportunity to test the effectiveness of therapy within the patients body. A complete pathologic response is associated with excellent long term outcomes. Patients have difficult time tolerating chemotherapy post cystectomy. There is a chance of over treating some patients who would have been otherwise been cured with surgery alone and would have not needed chemotherapy.   We discussed the individual side effects of cisplatin and gemcitabine. Most notably, we discussed tinnitus, hearing deficits, nephrotoxicity, peripheral neuropathy, nausea, and vomiting with cisplatin. We also reviewed the myelosuppression with these agents, and in particular thrombocytopenia, which is fairly common  with gemcitabine. Both agents together could contribute to fatigue, diminished appetite, altered taste, neutropenia and possibly neutropenic fever. Patients have to take every temperature greater than 100.4F seriously and immediately call us to care. In case she is neutropenic or would potentially get neutropenic soon, we would admit her and treat with IV antibiotics for 48 hrs.   I am concerned that her renal function would limit her ability to tolerate chemotherapy. We might be able to split the dose of cisplatin in 2 days and still might not be able to complete therapy.   I reviewed [id number] clinical trial in patients with muscle invasive bladder cancer who are not a candidate for cisplatin therapy. This trial randomizes patients to standard of care, pembrolizumab alone or with enfortumab arms. Both pembrolizumab and enfortumab have been approved for recurrent metastatic bladder cancer. This trial assess their role in neoadjuvant setting to improve the cure rates for subjects who are not cisplatin candidate.   I introduced both agents - pembrolizumab and enfortumab briefly for her so as not to overwhelm her. She has expressed interest in participation in the trial. I will have our research nurse reach out to her.    We discussed the placement for chemotherapy as standard of care or if she is randomized to treatment arm on the study.   Michaela history of left-sided invasive ductal breast cancer, ER/WV negative, Her2 postive . She presented with a left axillary mass. She had neoadjuvant chemotherapy with 4 cycles of dose dense adriamycin/cytoxan followed by 4 cycles of Taxol/herceptin.  She had a bilateral mastectomy 2/8/2017. She had complete pathologic response with only a 7 mm area of DCIS. She then went on to complete one year of herceptin. She has remained disease free.      PLAN   On reviewing all of her options, Michaela is interested in participating in the [id number] study  I will have our research nurse  reach out to her and explain about the study  I will coordinate care with urology team.       Video-Visit Details    Type of service:  Video Visit  Originating Location (pt. Location): Home  Distant Location (provider location):  [facility]  Platform used for Video Visit: [employer]    90 minutes spent on the date of the encounter doing chart review, history and exam, documentation and further activities as noted above      Flaco Cuellar    Hematologist and Medical Oncologist  [facility]        Again, thank you for allowing me to participate in the care of your patient.        Sincerely,        Flaco Cuellar MD

## 2021-03-25 NOTE — TELEPHONE ENCOUNTER
----- Message from VERENICE Christie sent at 3/25/2021 11:31 AM CDT -----  Regarding: FW: New HG T2 bladder cancer  Hi,     Could someone please have this patient schedule an appointment with Dr. Robles on 4/7 at 7:30am? Appointment can be either video or in person.    Thanks,  Franco  ----- Message -----  From: Leola Ellison RN  Sent: 3/25/2021  10:58 AM CDT  To: Britta Tavares CMA, VERENICE Christie  Subject: FW: New HG T2 bladder cancer                       ----- Message -----  From: Leonardo Robles MD  Sent: 3/25/2021   7:58 AM CDT  To: Flaco Cuellar MD, Girish Jack MD, #  Subject: RE: New HG T2 bladder cancer                       This is a patient for cystectomy discussion.  Can we find some time for me to see her either at [facility] or [facility] in the next couple of weeks?    Thanks,  Cristopher    ----- Message -----  From: Flaco Cuellar MD  Sent: 3/24/2021  11:38 AM CDT  To: Leonardo Robles MD, #  Subject: RE: New HG T2 bladder cancer                     I think there is hope for her. Creatinine just 1.12 and previously better numbers in Jan.     We also had a clinical trial for patients with borderline renal function - let me ensure that the trial is active.     Pa: Offer her tomorrow at [facility] at 3:30 pm. But no telephone visits.     Flaco   ----- Message -----  From: Girish Jack MD  Sent: 3/23/2021  10:16 AM CDT  To: Leonardo Robles MD, #  Subject: New HG T2 bladder cancer                         Hi Dr. Cuellar and Dr. Robles,    I followed up with Michaela today regarding her TURBT from last week.  This did confirm high-grade muscle invasive bladder cancer.  I ordered staging CT chest abdomen pelvis and placed a referral for medical oncology.  She does have borderline CKD stage III, so I am not sure if she will be a great candidate for neoadjuvant chemotherapy.  She is otherwise fairly healthy and would be a good surgical candidate.    Could you please have  your RN teams reach out to expedite scheduling.    Thanks,   Girish Jack M.D.

## 2021-03-25 NOTE — LETTER
3/25/2021         RE: Michaela Dorman  [address]  [facility] [address]        Dear Colleague,    Thank you for referring your patient, Michaela Dorman, to the [facility]. Please see a copy of my visit note below.    Michaela is a 70 year old who is being evaluated via a billable video visit.      How would you like to obtain your AVS? MyChart  If the video visit is dropped, the invitation should be resent by: Text to cell phone: [phone]  Will anyone else be joining your video visit? Yes:  Yefri. How would they like to receive their invitation? Send to e-mail at: [email or web address]      Video Start Time:   Video-Visit Details    Type of service:  Video Visit    Video End Time:     Originating Location (pt. Location): Home    Distant Location (provider location):  [facility]     Platform used for Video Visit: Vee Ma CMA        [facility]    NEW PATIENT VISIT NOTE    PATIENT NAME: Michaela Dorman MRN # [id number]  DATE OF VISIT: March 25, 2021 YOB: 1950    REFERRING PROVIDER: Girish Jack MD  [address] [place] [id number]    CANCER TYPE: Urothelial cancer from bladder  STAGE: cT2 disease     TREATMENT SUMMARY:  2/22/21 - cystoscopy with biopsy of bladder dome revealed high grade urothelial cancer without lamina propria or muscularis   Urine cytology same day was suspicious for high-grade urothelial carcinoma   3/18/21 - Bladder, left trigone, tumor, trans urethral resection of bladder tumor (TURBT) revealed invasive high-grade urothelial carcinoma   with extensive invasion of muscularis propria  3/24/21 -  CT chest, abdomen and pelvis - mild irregularity of the posterior urinary bladder wall    CURRENT INTERVENTIONS:  Ongoing work up     HISTORY OF PRESENT ILLNESS   Michaela Dorman is 70 year old female who has been referred for muscle invasive bladder  cancer     Michaela was reached over video visit. She notes that she started getting UTI in October 2020. All of her 4-5 cultures were negative. She had urgency and pelvic cramping like having a UTI. She was finally referred to a specialist. She was referred to Urologist. She had cystoscopy which suggested cancer.   She had trans urethral resection of bladder tumor (TURBT) done by Dr. Jack which has established muscle invasive bladder cancer and so she has been referred to Oncology.     She denies any hematuria. Her serial urinalysis always were negative except for the last time.     She had breast cancer in 2016. She had mastectomy, chemotherapy and radiation. She followed up with Dr. Ferguson.   A year ago she had major back surgery.   She needed carotid artery cleaning up done around last October.     She is very active. She rides horse, hunt and fish. She walks 3 miles a day.      PAST MEDICAL HISTORY     Past Medical History:   Diagnosis Date     Carotid stenosis, bilateral L80%, R40% 09/02/2020     Central stenosis of spinal canal 12/01/2017    lumbar      DDD (degenerative disc disease), lumbar 12/01/2017     Depressive disorder, not elsewhere classified      Esophageal reflux      ETOH abuse     in remission     Foraminal stenosis of lumbar region 12/01/2017    Complete lumbar spine left at various degrees and to a lesser extent the right as well.     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     Subdural hematoma (H)           CURRENT OUTPATIENT MEDICATIONS     Current Outpatient Medications   Medication Sig     acetaminophen (TYLENOL) 500 MG tablet Take 1,000 mg by mouth 3 times daily as needed for mild pain (500MG X 2 = 1,000MG)     ALPRAZolam (XANAX) 0.5 MG tablet TAKE 1 TABLET BY MOUTH DAILY AT  BEDTIME AS NEEDED FOR SLEEP     aspirin (ASA) 81 MG chewable tablet Take 1 tablet (81 mg) by mouth daily     atorvastatin (LIPITOR) 20 MG tablet TAKE ONE TABLET BY MOUTH ONCE DAILY     B-12 METHYLCOBALAMIN PO Take 5,000 mcg by mouth every morning     celecoxib (CELEBREX) 200 MG capsule TAKE 1 CAPSULE BY MOUTH TWICE A DAY     cephALEXin (KEFLEX) 250 MG capsule Take 1 capsule (250 mg) by mouth At Bedtime     COMPOUNDED NON-CONTROLLED SUBSTANCE (CMPD RX) - PHARMACY TO MIX COMPOUNDED MEDICATION Estriol 0.1% cream (1mg/gram) in HRT base. Place 1 gram vaginally daily for 2 weeks, then vaginally twice weekly.     diphenhydrAMINE (BENADRYL) 25 MG tablet Take 25 mg by mouth nightly as needed for itching or allergies     escitalopram (LEXAPRO) 10 MG tablet TAKE 1 TABLET BY MOUTH EVERY DAY WITH 20MG TABLETS     escitalopram (LEXAPRO) 20 MG tablet TAKE 1 TABLET BY MOUTH EVERY DAY WITH 20MG TABLETS     Garlic 1000 MG CAPS Take 1,000 mg by mouth every morning     Magnesium Oxide 500 MG TABS Take 500 mg by mouth every morning     omeprazole (PRILOSEC) 20 MG DR capsule Take 20 mg by mouth daily     oxyCODONE-acetaminophen (PERCOCET) 5-325 MG tablet Take 1 tablet by mouth every 6 hours as needed for pain     phenazopyridine (AZO URINARY PAIN RELIEF) 95 MG tablet Take 190 mg by mouth 3 times daily     polyethylene glycol (MIRALAX/GLYCOLAX) powder Take 1 capful by mouth every morning      prochlorperazine (COMPAZINE) 10 MG tablet TAKE 1 TABLET BY MOUTH EVERY 6 HOURS AS NEEDED FOR NAUSEA AND VOMITING     Psyllium (METAMUCIL FIBER PO) Take 2 teaspoonful by mouth every morning (mix in with liquid and drink)     senna-docusate (SENOKOT-S/PERICOLACE) 8.6-50 MG tablet Take 2 tablets by mouth 2 times daily (Patient taking differently: Take 2 tablets by mouth 2 times daily as needed for constipation )     vitamin C (ASCORBIC ACID) 1000 MG TABS Take 1,000 mg by mouth every morning     Vitamin D3 (CHOLECALCIFEROL) 25 mcg (1000 units) tablet  Take by mouth daily     Zinc 30 MG CAPS Take 30 mg by mouth every morning      No current facility-administered medications for this visit.         ALLERGIES      Allergies   Allergen Reactions     No Known Drug Allergies      Oxybutynin      Patient reports symptoms of nausea and mind fogginess        SOCIAL HISTORY   She is  and lives with her . 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     She does not smoke. She smoked from age 16 to age 29 yrs of age. She smoked about 1-2 packs daily during that time. She drinks alcohol - 1-2 drinks after supper. She denies recreational drug use.      FAMILY HISTORY   - 2 sisters had breast cancer (she has 4 sisters); her youngest sister got it at 54 and oldest sister was 78 yrs at the time of breast cancer diagnosis  - brother had sinus cancer and bladder cancer  - both parents were healthy     REVIEW OF SYSTEMS   As above in the HPI, o/w complete 12-point ROS was negative.     PHYSICAL EXAM   B/P: Data Unavailable, T: Data Unavailable, P: Data Unavailable, R: Data Unavailable  @[email or web address](4)@  Wt Readings from Last 3 Encounters:   03/25/21 59.9 kg (132 lb)   03/08/21 62.3 kg (137 lb 6.4 oz)   12/24/20 63.5 kg (140 lb)     GEN: NAD  HEENT: PERRL, EOMI, no icterus, injection or pallor. Oropharynx is clear.  NECK: no cervical or supraclavicular lymphadenopathy  LUNGS: clear bilaterally  CV: regular, no murmurs, rubs, or gallops  ABDOMEN: soft, non-tender, non-distended, normal bowel sounds, no hepatosplenomegaly by percussion or palpation  EXT: warm, well perfused, no edema  NEURO: alert  SKIN: no rashes     LABORATORY AND IMAGING STUDIES     Recent Labs   Lab Test 02/03/21  1511 01/21/21  1001 01/12/21  1400 12/24/20  1104 12/11/20  1034    136 138 136 139   POTASSIUM 4.2 4.3 4.1 4.9 4.6   CHLORIDE 105 104 106 103 108   CO2 26 28 27 28 26   ANIONGAP 4 4 5 5 5   BUN 26 18 22 19 26   CR 1.12* 0.96 1.20* 1.20* 1.19*   GLC 83 86 79 86 95   VANDANA 9.3  9.3 10.1 9.9 9.7     Recent Labs   Lab Test 02/03/21  1511 01/21/21  1001 01/12/21  1400   PHOS 3.3 2.7 3.5     Recent Labs   Lab Test 12/24/20  1104 12/11/20  1034 11/20/20  1151 10/01/20  1139 08/31/20  1241 07/16/20  1051   WBC 7.4 8.4 8.6 9.2 8.1 7.1   HGB 11.1* 11.0* 11.0* 11.3* 13.1 11.5*    236 254 269 269 284   * 106* 103* 101* 101* 99   NEUTROPHIL  --  74.5 69.4 69.3 68.9 70.7     Recent Labs   Lab Test 02/03/21  1511 01/21/21  1001 01/12/21  1400 12/24/20  1104 12/11/20  1034 10/01/20  1139   BILITOTAL  --   --   --  0.3 0.5 0.3   ALKPHOS  --   --   --  42 47 61   ALT  --   --   --  24 28 49   AST  --   --   --  21 30 50*   ALBUMIN 4.1 4.0 4.2 4.2 4.2 4.3     TSH   Date Value Ref Range Status   01/24/2017 1.34 0.40 - 4.00 mU/L Final   08/31/2016 0.39 (L) 0.40 - 4.00 mU/L Final   06/10/2013 0.49 0.4 - 5.0 mU/L Final         Lab Results   Component Value Date    PATH  03/18/2021     Patient Name: NICHOLAS DONNELLY  MR#: [id number]  Specimen #: [id number]  Collected: 3/18/2021  Received: 3/19/2021  Reported: 3/22/2021 15:29  Ordering Phy(s): DANNY HOWE    For improved result formatting, select 'View Enhanced Report Format' under   Linked Documents section.    SPECIMEN(S):  A: Bladder biopsy, anterior bladder wall  B: Bladder biopsy, left trigone    FINAL DIAGNOSIS:  A. Bladder, anterior wall, biopsy:  - Non-invasive low grade papillary urothelial carcinoma    B. Bladder, left trigone, tumor, TURBT:  - Invasive high-grade urothelial carcinoma  - Tumor extensively invades muscularis propria    I have personally reviewed all specimens and/or slides, including the   listed special stains, and used them  with my medical judgement to determine or confirm the final diagnosis.    Electronically signed out by:    Carson Simon M.D., [facility]    CLINICAL HISTORY:  The patient is a 70-year-old woman with history of frequent urinary tract   infections and a newly diagnosed  high-grade urothelial  "carcinoma ([id number]). She underwent restaging   TURBT. Cystoscopy showed an abnormal left  trigone.    GROSS:  A:  The specimen is received in formalin with proper patient   identification, labeled \"anterior bladder wall\".  The specimen consists of one segment of tan soft tissue measuring 1.0 cm   in greatest dimension.  It is  entirely submitted in cassette A1.    B:  The specimen is received in formalin with proper patient   identification, labeled \"left trigone\".  The  specimen consists of four segments of tan cauterized soft tissue ranging   from 0.5-1.2 cm in greatest  dimension.  It is wrapped and entirely submitted in cassette B 1.   (Dictated by: Rebecca CASTANON [facility]  3/19/2021 11:00 AM)    MICROSCOPIC:  Microscopic examination was performed.    The technical component of this testing was completed at the [facility], with the professional component performed   at the [facility], [address] [place] [id number] ([phone])    CPT Codes:  A: 77223-TO8  B: 13850-BU6    COLLECTION SITE:  Client: General acute hospital  Location: [facility] (B)    Resident  [email or web address]         Results for orders placed or performed during the hospital encounter of 03/24/21   CT Chest/Abdomen/Pelvis w Contrast    Narrative    CT CHEST/ABDOMEN/PELVIS WITH CONTRAST March 24, 2021 8:18 AM    CLINICAL HISTORY: Bladder cancer, invasive, assess treatment response.  Newly diagnosed muscle invasive bladder cancer. Urothelial carcinoma  of bladder with invasion of muscle (H). Personal history of malignant  neoplasm of bladder.    TECHNIQUE: CT scan of the chest, abdomen, and pelvis was performed  following injection of IV contrast. Multiplanar reformats were  obtained. Dose reduction techniques were used.   CONTRAST: Isovue-370, 70mL    COMPARISON: CT abdomen and pelvis dated 2/22/2021, " CT/PET dated  3/10/2017.    FINDINGS:   LUNGS AND PLEURA: A nodular density medial right minor fissure (image  161 series 5) measures slightly less than 0.3 cm and is of doubtful  clinical significance. Lungs are otherwise clear without other nodule,  mass, infiltrate, effusion, or pneumothorax.    MEDIASTINUM/AXILLAE: There are extensive coronary artery  calcifications and/or stents. Thoracic aortic calcifications are also  noted. No mediastinal, hilar, or axillary lymphadenopathy is  identified. No obvious central pulmonary artery filling defects to  suggest central pulmonary artery embolism. The study was not optimized  for pulmonary artery evaluation. No evidence for aortic dissection.  Left side of the thyroid is hypotrophic. Thyroid is otherwise  unremarkable, as visualized.    HEPATOBILIARY: Normal.    PANCREAS: Normal.    SPLEEN: Normal.    ADRENAL GLANDS: Normal.    KIDNEYS/BLADDER: There is mild fetal lobulation bilaterally. The  kidneys otherwise enhance normally. No hydronephrosis,  nephrolithiasis, hydroureter, or ureteral calculus is identified.  There is mild irregularity of the posterior urinary bladder wall.  Urinary bladder is otherwise mostly decompressed and unremarkable.    BOWEL: There is a moderate amount of stool in the colon. There are a  few scattered diverticula. No pericolonic inflammatory change to  suggest acute diverticulitis. There is questionable mild thickening of  the wall of the cecum. Fluid-filled cecum. Colon is otherwise  unremarkable. Appendix is not well seen. No pericecal inflammatory  change to suggest acute appendicitis. A small tubular structure  extending from the region of cecum (image 270 series 6) could  represent a portion of a normal appendix. Small bowel is grossly of  normal caliber. There is mild thickening of the wall the gastric  antrum and pylorus which could be due to incomplete distention.  Infiltrative or inflammatory process is difficult to entirely  exclude.  Stomach is otherwise mostly decompressed and unremarkable.    PELVIC ORGANS: Uterus and left ovary are normal in appearance. Right  ovary is not well seen. No adnexal masses are identified.    ADDITIONAL FINDINGS: No adenopathy, free fluid, or free air is seen in  the peritoneal cavity. There is extensive nonaneurysmal  atherosclerosis. This includes aorta as well as the origins of the  superior mesenteric artery, bilateral renal arteries and bilateral  common iliac arteries.     MUSCULOSKELETAL: No osseous lesions or acute osseous fractures are  seen. Postoperative changes in the spine cause streak artifact  partially limiting evaluation of the spine. There are degenerative  changes of the hips and in the spine. Right shoulder arthroplasty  causes streak artifact mildly limiting evaluation of the right upper  chest and shoulder.      Impression    IMPRESSION:   1. There is mild irregularity of the posterior urinary bladder wall  which could be the site of the patient's malignancy. Urinary bladder  is otherwise unremarkable. There is a benign normal variant of  bilateral fetal renal lobulation. No other urinary system abnormality  is seen.  2. No definite metastases are identified.  3. Tiny nodular density in the medial right minor fissure measuring  less than 0.3 cm is of doubtful clinical significance.   4. Mild irregularity of the cecum wall could represent a normal  ileocecal valve although an infiltrative process in the wall of the  cecum is difficult to entirely excluded in appropriate setting.  Colonoscopy is recommended if not recently performed. Moderate amount  of stool is otherwise seen throughout the colon.  5. At least moderate atherosclerosis including coronary arteries,  aorta, and major arteries of the abdomen and pelvis as described  above.  6. No other significant abnormalities are identified.    LENA ROME MD     *Note: Due to a large number of results and/or encounters for the  requested time period, some results have not been displayed. A complete set of results can be found in Results Review.         ASSESSMENT    High grade muscle invasive bladder cancer involving the left trigone  Medical comorbidities including chronic kidney disease stage III A, carotid stenosis  Previously treated breast cancer  ECOG PS 0    DISCUSSION   I had a lengthy discussion with Michaela regarding recent diagnosis of bladder cancer. We briefly touched on the fact that cigarette smoking is the most significant cause for bladder cancer, and it is encouraging that Michaela quit smoking several decades ago and only briefly smoked. We focused on the typical disease course, prognosis, and different treatment options.   Michaela has locally advanced disease with a definite area of muscle invasion, high-grade bladder tumor. Despite surgical resection a significant portion of bladder cancer does recur. To decrease this risk of recurrence chemotherapy either prior to cystectomy (jostin-adjuvant) or post resection in the adjuvant setting has been studied. There has been evidence to suggest benefit for patients with high risk disease who received jostin-adjuvant chemotherapy. This addresses micro-metastatic disease at the earliest. This gives an opportunity to test the effectiveness of therapy within the patients body. A complete pathologic response is associated with excellent long term outcomes. Patients have difficult time tolerating chemotherapy post cystectomy. There is a chance of over treating some patients who would have been otherwise been cured with surgery alone and would have not needed chemotherapy.   We discussed the individual side effects of cisplatin and gemcitabine. Most notably, we discussed tinnitus, hearing deficits, nephrotoxicity, peripheral neuropathy, nausea, and vomiting with cisplatin. We also reviewed the myelosuppression with these agents, and in particular thrombocytopenia, which is fairly common  with gemcitabine. Both agents together could contribute to fatigue, diminished appetite, altered taste, neutropenia and possibly neutropenic fever. Patients have to take every temperature greater than 100.4F seriously and immediately call us to care. In case she is neutropenic or would potentially get neutropenic soon, we would admit her and treat with IV antibiotics for 48 hrs.   I am concerned that her renal function would limit her ability to tolerate chemotherapy. We might be able to split the dose of cisplatin in 2 days and still might not be able to complete therapy.   I reviewed [id number] clinical trial in patients with muscle invasive bladder cancer who are not a candidate for cisplatin therapy. This trial randomizes patients to standard of care, pembrolizumab alone or with enfortumab arms. Both pembrolizumab and enfortumab have been approved for recurrent metastatic bladder cancer. This trial assess their role in neoadjuvant setting to improve the cure rates for subjects who are not cisplatin candidate.   I introduced both agents - pembrolizumab and enfortumab briefly for her so as not to overwhelm her. She has expressed interest in participation in the trial. I will have our research nurse reach out to her.    We discussed the placement for chemotherapy as standard of care or if she is randomized to treatment arm on the study.   Michaela history of left-sided invasive ductal breast cancer, ER/GA negative, Her2 postive . She presented with a left axillary mass. She had neoadjuvant chemotherapy with 4 cycles of dose dense adriamycin/cytoxan followed by 4 cycles of Taxol/herceptin.  She had a bilateral mastectomy 2/8/2017. She had complete pathologic response with only a 7 mm area of DCIS. She then went on to complete one year of herceptin. She has remained disease free.      PLAN   On reviewing all of her options, Michaela is interested in participating in the [id number] study  I will have our research nurse  reach out to her and explain about the study  I will coordinate care with urology team.       Video-Visit Details    Type of service:  Video Visit  Originating Location (pt. Location): Home  Distant Location (provider location):  [facility]  Platform used for Video Visit: Insmed    90 minutes spent on the date of the encounter doing chart review, history and exam, documentation and further activities as noted above      Flaco Cuellar    Hematologist and Medical Oncologist  [facility]        Again, thank you for allowing me to participate in the care of your patient.        Sincerely,        Flaco Cuellar MD

## 2021-03-25 NOTE — PROGRESS NOTES
[facility] CANCER CLINIC    NEW PATIENT VISIT NOTE    PATIENT NAME: Michaela Dorman MRN # [id number]  DATE OF VISIT: March 25, 2021 YOB: 1950    REFERRING PROVIDER: Girish Jack MD  [address] [place] [id number]    CANCER TYPE: Urothelial cancer from bladder  STAGE: cT2 disease     TREATMENT SUMMARY:  2/22/21 - cystoscopy with biopsy of bladder dome revealed high grade urothelial cancer without lamina propria or muscularis   Urine cytology same day was suspicious for high-grade urothelial carcinoma   3/18/21 - Bladder, left trigone, tumor, trans urethral resection of bladder tumor (TURBT) revealed invasive high-grade urothelial carcinoma   with extensive invasion of muscularis propria  3/24/21 -  CT chest, abdomen and pelvis - mild irregularity of the posterior urinary bladder wall    CURRENT INTERVENTIONS:  Ongoing work up     HISTORY OF PRESENT ILLNESS   Michaela Dorman is 70 year old female who has been referred for muscle invasive bladder cancer     Michaela was reached over video visit. She notes that she started getting UTI in October 2020. All of her 4-5 cultures were negative. She had urgency and pelvic cramping like having a UTI. She was finally referred to a specialist. She was referred to Urologist. She had cystoscopy which suggested cancer.   She had trans urethral resection of bladder tumor (TURBT) done by Dr. Jack which has established muscle invasive bladder cancer and so she has been referred to Oncology.     She denies any hematuria. Her serial urinalysis always were negative except for the last time.     She had breast cancer in 2016. She had mastectomy, chemotherapy and radiation. She followed up with Dr. Freguson.   A year ago she had major back surgery.   She needed carotid artery cleaning up done around last October.     She is very active. She rides horse, hunt and fish. She walks 3 miles a day.      PAST MEDICAL HISTORY     Past Medical  History:   Diagnosis Date     Carotid stenosis, bilateral L80%, R40% 09/02/2020     Central stenosis of spinal canal 12/01/2017    lumbar      DDD (degenerative disc disease), lumbar 12/01/2017     Depressive disorder, not elsewhere classified      Esophageal reflux      ETOH abuse     in remission     Foraminal stenosis of lumbar region 12/01/2017    Complete lumbar spine left at various degrees and to a lesser extent the right as well.     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     Subdural hematoma (H)           CURRENT OUTPATIENT MEDICATIONS     Current Outpatient Medications   Medication Sig     acetaminophen (TYLENOL) 500 MG tablet Take 1,000 mg by mouth 3 times daily as needed for mild pain (500MG X 2 = 1,000MG)     ALPRAZolam (XANAX) 0.5 MG tablet TAKE 1 TABLET BY MOUTH DAILY AT BEDTIME AS NEEDED FOR SLEEP     aspirin (ASA) 81 MG chewable tablet Take 1 tablet (81 mg) by mouth daily     atorvastatin (LIPITOR) 20 MG tablet TAKE ONE TABLET BY MOUTH ONCE DAILY     B-12 METHYLCOBALAMIN PO Take 5,000 mcg by mouth every morning     celecoxib (CELEBREX) 200 MG capsule TAKE 1 CAPSULE BY MOUTH TWICE A DAY     cephALEXin (KEFLEX) 250 MG capsule Take 1 capsule (250 mg) by mouth At Bedtime     COMPOUNDED NON-CONTROLLED SUBSTANCE (CMPD RX) - PHARMACY TO MIX COMPOUNDED MEDICATION Estriol 0.1% cream (1mg/gram) in HRT base. Place 1 gram vaginally daily for 2 weeks, then vaginally twice weekly.     diphenhydrAMINE (BENADRYL) 25 MG tablet Take 25 mg by mouth nightly as needed for itching or allergies     escitalopram (LEXAPRO) 10 MG tablet TAKE 1 TABLET BY MOUTH EVERY DAY WITH 20MG TABLETS     escitalopram (LEXAPRO) 20 MG tablet TAKE 1 TABLET BY MOUTH EVERY DAY WITH 20MG TABLETS     Garlic 1000 MG CAPS Take 1,000 mg by  mouth every morning     Magnesium Oxide 500 MG TABS Take 500 mg by mouth every morning     omeprazole (PRILOSEC) 20 MG DR capsule Take 20 mg by mouth daily     oxyCODONE-acetaminophen (PERCOCET) 5-325 MG tablet Take 1 tablet by mouth every 6 hours as needed for pain     phenazopyridine (AZO URINARY PAIN RELIEF) 95 MG tablet Take 190 mg by mouth 3 times daily     polyethylene glycol (MIRALAX/GLYCOLAX) powder Take 1 capful by mouth every morning      prochlorperazine (COMPAZINE) 10 MG tablet TAKE 1 TABLET BY MOUTH EVERY 6 HOURS AS NEEDED FOR NAUSEA AND VOMITING     Psyllium (METAMUCIL FIBER PO) Take 2 teaspoonful by mouth every morning (mix in with liquid and drink)     senna-docusate (SENOKOT-S/PERICOLACE) 8.6-50 MG tablet Take 2 tablets by mouth 2 times daily (Patient taking differently: Take 2 tablets by mouth 2 times daily as needed for constipation )     vitamin C (ASCORBIC ACID) 1000 MG TABS Take 1,000 mg by mouth every morning     Vitamin D3 (CHOLECALCIFEROL) 25 mcg (1000 units) tablet Take by mouth daily     Zinc 30 MG CAPS Take 30 mg by mouth every morning      No current facility-administered medications for this visit.         ALLERGIES      Allergies   Allergen Reactions     No Known Drug Allergies      Oxybutynin      Patient reports symptoms of nausea and mind fogginess        SOCIAL HISTORY   She is  and lives with her . 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     She does not smoke. She smoked from age 16 to age 29 yrs of age. She smoked about 1-2 packs daily during that time. She drinks alcohol - 1-2 drinks after supper. She denies recreational drug use.      FAMILY HISTORY   - 2 sisters had breast cancer (she has 4 sisters); her youngest sister got it at 54 and oldest sister was 78 yrs at the time of breast cancer diagnosis  - brother had sinus cancer and bladder cancer  - both parents were healthy     REVIEW OF SYSTEMS   As above in the HPI, o/w complete 12-point ROS  was negative.     PHYSICAL EXAM   B/P: Data Unavailable, T: Data Unavailable, P: Data Unavailable, R: Data Unavailable  @[email or web address](4)@  Wt Readings from Last 3 Encounters:   03/25/21 59.9 kg (132 lb)   03/08/21 62.3 kg (137 lb 6.4 oz)   12/24/20 63.5 kg (140 lb)     GEN: NAD  HEENT: PERRL, EOMI, no icterus, injection or pallor. Oropharynx is clear.  NECK: no cervical or supraclavicular lymphadenopathy  LUNGS: clear bilaterally  CV: regular, no murmurs, rubs, or gallops  ABDOMEN: soft, non-tender, non-distended, normal bowel sounds, no hepatosplenomegaly by percussion or palpation  EXT: warm, well perfused, no edema  NEURO: alert  SKIN: no rashes     LABORATORY AND IMAGING STUDIES     Recent Labs   Lab Test 02/03/21  1511 01/21/21  1001 01/12/21  1400 12/24/20  1104 12/11/20  1034    136 138 136 139   POTASSIUM 4.2 4.3 4.1 4.9 4.6   CHLORIDE 105 104 106 103 108   CO2 26 28 27 28 26   ANIONGAP 4 4 5 5 5   BUN 26 18 22 19 26   CR 1.12* 0.96 1.20* 1.20* 1.19*   GLC 83 86 79 86 95   VANDANA 9.3 9.3 10.1 9.9 9.7     Recent Labs   Lab Test 02/03/21  1511 01/21/21  1001 01/12/21  1400   PHOS 3.3 2.7 3.5     Recent Labs   Lab Test 12/24/20  1104 12/11/20  1034 11/20/20  1151 10/01/20  1139 08/31/20  1241 07/16/20  1051   WBC 7.4 8.4 8.6 9.2 8.1 7.1   HGB 11.1* 11.0* 11.0* 11.3* 13.1 11.5*    236 254 269 269 284   * 106* 103* 101* 101* 99   NEUTROPHIL  --  74.5 69.4 69.3 68.9 70.7     Recent Labs   Lab Test 02/03/21  1511 01/21/21  1001 01/12/21  1400 12/24/20  1104 12/11/20  1034 10/01/20  1139   BILITOTAL  --   --   --  0.3 0.5 0.3   ALKPHOS  --   --   --  42 47 61   ALT  --   --   --  24 28 49   AST  --   --   --  21 30 50*   ALBUMIN 4.1 4.0 4.2 4.2 4.2 4.3     TSH   Date Value Ref Range Status   01/24/2017 1.34 0.40 - 4.00 mU/L Final   08/31/2016 0.39 (L) 0.40 - 4.00 mU/L Final   06/10/2013 0.49 0.4 - 5.0 mU/L Final         Lab Results   Component Value Date    PATH  03/18/2021     Patient Name: CONY  "NICHOLAS PRESCOTT  MR#: [id number]  Specimen #: [id number]  Collected: 3/18/2021  Received: 3/19/2021  Reported: 3/22/2021 15:29  Ordering Phy(s): DANNY HOWE    For improved result formatting, select 'View Enhanced Report Format' under   Linked Documents section.    SPECIMEN(S):  A: Bladder biopsy, anterior bladder wall  B: Bladder biopsy, left trigone    FINAL DIAGNOSIS:  A. Bladder, anterior wall, biopsy:  - Non-invasive low grade papillary urothelial carcinoma    B. Bladder, left trigone, tumor, TURBT:  - Invasive high-grade urothelial carcinoma  - Tumor extensively invades muscularis propria    I have personally reviewed all specimens and/or slides, including the   listed special stains, and used them  with my medical judgement to determine or confirm the final diagnosis.    Electronically signed out by:    Carson Simon M.D., [facility]    CLINICAL HISTORY:  The patient is a 70-year-old woman with history of frequent urinary tract   infections and a newly diagnosed  high-grade urothelial carcinoma ([id number]). She underwent restaging   TURBT. Cystoscopy showed an abnormal left  trigone.    GROSS:  A:  The specimen is received in formalin with proper patient   identification, labeled \"anterior bladder wall\".  The specimen consists of one segment of tan soft tissue measuring 1.0 cm   in greatest dimension.  It is  entirely submitted in cassette A1.    B:  The specimen is received in formalin with proper patient   identification, labeled \"left trigone\".  The  specimen consists of four segments of tan cauterized soft tissue ranging   from 0.5-1.2 cm in greatest  dimension.  It is wrapped and entirely submitted in cassette B 1.   (Dictated by: Rebecca CASTANON [facility]  3/19/2021 11:00 AM)    MICROSCOPIC:  Microscopic examination was performed.    The technical component of this testing was completed at the [facility], with the professional component performed   at " the [facility], [address] [place] [address],   [place] [address] ([phone])    CPT Codes:  A: 62180-QB2  B: 43485-EY9    COLLECTION SITE:  Client: [facility]  Location: [facility] (B)    Resident  [email or web address]         Results for orders placed or performed during the hospital encounter of 03/24/21   CT Chest/Abdomen/Pelvis w Contrast    Narrative    CT CHEST/ABDOMEN/PELVIS WITH CONTRAST March 24, 2021 8:18 AM    CLINICAL HISTORY: Bladder cancer, invasive, assess treatment response.  Newly diagnosed muscle invasive bladder cancer. Urothelial carcinoma  of bladder with invasion of muscle (H). Personal history of malignant  neoplasm of bladder.    TECHNIQUE: CT scan of the chest, abdomen, and pelvis was performed  following injection of IV contrast. Multiplanar reformats were  obtained. Dose reduction techniques were used.   CONTRAST: Isovue-370, 70mL    COMPARISON: CT abdomen and pelvis dated 2/22/2021, CT/PET dated  3/10/2017.    FINDINGS:   LUNGS AND PLEURA: A nodular density medial right minor fissure (image  161 series 5) measures slightly less than 0.3 cm and is of doubtful  clinical significance. Lungs are otherwise clear without other nodule,  mass, infiltrate, effusion, or pneumothorax.    MEDIASTINUM/AXILLAE: There are extensive coronary artery  calcifications and/or stents. Thoracic aortic calcifications are also  noted. No mediastinal, hilar, or axillary lymphadenopathy is  identified. No obvious central pulmonary artery filling defects to  suggest central pulmonary artery embolism. The study was not optimized  for pulmonary artery evaluation. No evidence for aortic dissection.  Left side of the thyroid is hypotrophic. Thyroid is otherwise  unremarkable, as visualized.    HEPATOBILIARY: Normal.    PANCREAS: Normal.    SPLEEN: Normal.    ADRENAL GLANDS: Normal.    KIDNEYS/BLADDER: There is mild fetal lobulation bilaterally.  The  kidneys otherwise enhance normally. No hydronephrosis,  nephrolithiasis, hydroureter, or ureteral calculus is identified.  There is mild irregularity of the posterior urinary bladder wall.  Urinary bladder is otherwise mostly decompressed and unremarkable.    BOWEL: There is a moderate amount of stool in the colon. There are a  few scattered diverticula. No pericolonic inflammatory change to  suggest acute diverticulitis. There is questionable mild thickening of  the wall of the cecum. Fluid-filled cecum. Colon is otherwise  unremarkable. Appendix is not well seen. No pericecal inflammatory  change to suggest acute appendicitis. A small tubular structure  extending from the region of cecum (image 270 series 6) could  represent a portion of a normal appendix. Small bowel is grossly of  normal caliber. There is mild thickening of the wall the gastric  antrum and pylorus which could be due to incomplete distention.  Infiltrative or inflammatory process is difficult to entirely exclude.  Stomach is otherwise mostly decompressed and unremarkable.    PELVIC ORGANS: Uterus and left ovary are normal in appearance. Right  ovary is not well seen. No adnexal masses are identified.    ADDITIONAL FINDINGS: No adenopathy, free fluid, or free air is seen in  the peritoneal cavity. There is extensive nonaneurysmal  atherosclerosis. This includes aorta as well as the origins of the  superior mesenteric artery, bilateral renal arteries and bilateral  common iliac arteries.     MUSCULOSKELETAL: No osseous lesions or acute osseous fractures are  seen. Postoperative changes in the spine cause streak artifact  partially limiting evaluation of the spine. There are degenerative  changes of the hips and in the spine. Right shoulder arthroplasty  causes streak artifact mildly limiting evaluation of the right upper  chest and shoulder.      Impression    IMPRESSION:   1. There is mild irregularity of the posterior urinary bladder  wall  which could be the site of the patient's malignancy. Urinary bladder  is otherwise unremarkable. There is a benign normal variant of  bilateral fetal renal lobulation. No other urinary system abnormality  is seen.  2. No definite metastases are identified.  3. Tiny nodular density in the medial right minor fissure measuring  less than 0.3 cm is of doubtful clinical significance.   4. Mild irregularity of the cecum wall could represent a normal  ileocecal valve although an infiltrative process in the wall of the  cecum is difficult to entirely excluded in appropriate setting.  Colonoscopy is recommended if not recently performed. Moderate amount  of stool is otherwise seen throughout the colon.  5. At least moderate atherosclerosis including coronary arteries,  aorta, and major arteries of the abdomen and pelvis as described  above.  6. No other significant abnormalities are identified.    LENA ROME MD     *Note: Due to a large number of results and/or encounters for the requested time period, some results have not been displayed. A complete set of results can be found in Results Review.         ASSESSMENT    High grade muscle invasive bladder cancer involving the left trigone  Medical comorbidities including chronic kidney disease stage III A, carotid stenosis  Previously treated breast cancer  ECOG PS 0    DISCUSSION   I had a lengthy discussion with Michaela regarding recent diagnosis of bladder cancer. We briefly touched on the fact that cigarette smoking is the most significant cause for bladder cancer, and it is encouraging that Michaela quit smoking several decades ago and only briefly smoked. We focused on the typical disease course, prognosis, and different treatment options.   Michaela has locally advanced disease with a definite area of muscle invasion, high-grade bladder tumor. Despite surgical resection a significant portion of bladder cancer does recur. To decrease this risk of recurrence  chemotherapy either prior to cystectomy (jostin-adjuvant) or post resection in the adjuvant setting has been studied. There has been evidence to suggest benefit for patients with high risk disease who received jostin-adjuvant chemotherapy. This addresses micro-metastatic disease at the earliest. This gives an opportunity to test the effectiveness of therapy within the patients body. A complete pathologic response is associated with excellent long term outcomes. Patients have difficult time tolerating chemotherapy post cystectomy. There is a chance of over treating some patients who would have been otherwise been cured with surgery alone and would have not needed chemotherapy.   We discussed the individual side effects of cisplatin and gemcitabine. Most notably, we discussed tinnitus, hearing deficits, nephrotoxicity, peripheral neuropathy, nausea, and vomiting with cisplatin. We also reviewed the myelosuppression with these agents, and in particular thrombocytopenia, which is fairly common with gemcitabine. Both agents together could contribute to fatigue, diminished appetite, altered taste, neutropenia and possibly neutropenic fever. Patients have to take every temperature greater than 100.4F seriously and immediately call us to care. In case she is neutropenic or would potentially get neutropenic soon, we would admit her and treat with IV antibiotics for 48 hrs.   I am concerned that her renal function would limit her ability to tolerate chemotherapy. We might be able to split the dose of cisplatin in 2 days and still might not be able to complete therapy.   I reviewed [id number] clinical trial in patients with muscle invasive bladder cancer who are not a candidate for cisplatin therapy. This trial randomizes patients to standard of care, pembrolizumab alone or with enfortumab arms. Both pembrolizumab and enfortumab have been approved for recurrent metastatic bladder cancer. This trial assess their role in neoadjuvant  setting to improve the cure rates for subjects who are not cisplatin candidate.   I introduced both agents - pembrolizumab and enfortumab briefly for her so as not to overwhelm her. She has expressed interest in participation in the trial. I will have our research nurse reach out to her.    We discussed the placement for chemotherapy as standard of care or if she is randomized to treatment arm on the study.   Michaela history of left-sided invasive ductal breast cancer, ER/NH negative, Her2 postive . She presented with a left axillary mass. She had neoadjuvant chemotherapy with 4 cycles of dose dense adriamycin/cytoxan followed by 4 cycles of Taxol/herceptin.  She had a bilateral mastectomy 2/8/2017. She had complete pathologic response with only a 7 mm area of DCIS. She then went on to complete one year of herceptin. She has remained disease free.      PLAN   On reviewing all of her options, Michaela is interested in participating in the [id number] study  I will have our research nurse reach out to her and explain about the study  I will coordinate care with urology team.       Video-Visit Details    Type of service:  Video Visit  Originating Location (pt. Location): Home  Distant Location (provider location):  [facility]  Platform used for Video Visit: [employer]    90 minutes spent on the date of the encounter doing chart review, history and exam, documentation and further activities as noted above      Flaco Cuellar    Hematologist and Medical Oncologist  [facility]

## 2021-03-26 ENCOUNTER — MYC MEDICAL ADVICE (OUTPATIENT)
Dept: FAMILY MEDICINE | Facility: OTHER | Age: 71
End: 2021-03-26

## 2021-03-26 ENCOUNTER — MYC MEDICAL ADVICE (OUTPATIENT)
Dept: ONCOLOGY | Facility: CLINIC | Age: 71
End: 2021-03-26

## 2021-03-26 ENCOUNTER — TELEPHONE (OUTPATIENT)
Dept: ONCOLOGY | Facility: CLINIC | Age: 71
End: 2021-03-26

## 2021-03-26 NOTE — TELEPHONE ENCOUNTER
Patient was given zoloft to help her sleep about 4 years ago when she had breast cancer.    Now she has bladder cancer and is wondering if this is the best choice for her anxiety on a daily bases.    She would prefer a medication that does not cause her to be sleepy.    Please advise.    Moira Pinto RN on 3/26/2021 at 12:04 PM

## 2021-03-26 NOTE — TELEPHONE ENCOUNTER
MEDICAL RECORDS REQUEST   [facility]  Urology Clinic  [address] [place] [address]  PHONE: [phone]  Fax: [phone]        FUTURE VISIT INFORMATION                                                   Michaela Dorman, : 1950 scheduled for future visit at [facility]    APPOINTMENT INFORMATION:    Date: 2021    Provider:  MD Margaret    Reason for Visit/Diagnosis: Bladder CA    REFERRAL INFORMATION:    Referring provider:  N/A    Specialty: N/a    Referring providers clinic:      Clinic contact number:  N/A    RECORDS REQUESTED FOR VISIT                                                     NOTES  STATUS/DETAILS   OFFICE NOTE from referring provider  yes   OFFICE NOTE from other specialist  yes   DISCHARGE SUMMARY from hospital  no   DISCHARGE REPORT from the ER  no   OPERATIVE REPORT  yes   MEDICATION LIST  yes   BLADDER CANCER     PATHOLOGY REPORT & SLIDES  yes   IMAGES  yes- 3/24/21     PRE-VISIT CHECKLIST      Record collection complete Yes   Appointment appropriately scheduled           (right time/right provider) Yes   MyChart activation Yes   Questionnaire complete If no, please explain: in preocess      Completed by: Carina Valverde

## 2021-03-26 NOTE — TELEPHONE ENCOUNTER
Patient is encouraged to use her Xanax and to cut it in half to try and control her anxiety / panic.  She is to let us know how this works over the week end.  Electronically signed:    Phani Jason PA-C

## 2021-03-30 ENCOUNTER — DOCUMENTATION ONLY (OUTPATIENT)
Dept: ONCOLOGY | Facility: CLINIC | Age: 71
End: 2021-03-30

## 2021-03-30 ENCOUNTER — TELEPHONE (OUTPATIENT)
Dept: ONCOLOGY | Facility: CLINIC | Age: 71
End: 2021-03-30

## 2021-03-30 NOTE — TELEPHONE ENCOUNTER
Hi doctor. After much research and family consultations I have decided not to go with the trial and go with chemo instead. So what is my next step

## 2021-03-30 NOTE — TELEPHONE ENCOUNTER
Michaela was contacted by phone today regarding her potential participation in the [employer] KEYNOTE 905 clinical trial.  Dr. Cuellar discussed the trial with her at her 3/25/21 visit and she indicated to him that she was interested in the study.  We discussed the study briefly in broad terms and she agreed to come into the [facility] 3/30/21 to discuss the study in more detail and to be consented.  The ICF was emailed to her to review in advance of her visit.

## 2021-03-30 NOTE — PROGRESS NOTES
Ms. Dorman was contacted by phone 3/26/21 at the request of Dr. Cuellar to discuss her participation in the [employer] KEYNOTE 905 clinical trial.  He reviewed the study with her at her 3/25/21 office visit and she indicated she was interested in the study.  She agreed to come into the [facility] clinic 3/30/21 to discuss the study in more detail and to be consented. The ICF was emailed to her for her review prior to her visit.  After reviewing the ICF with her family, she notified by phone that she was declining to participate in the study.  Dr. Cuellar was notified.

## 2021-03-30 NOTE — TELEPHONE ENCOUNTER
Spoke to Michaela today. She is declining the study and would like to proceed with chemo and port.   After review of Dr. Cuellar's dictation from last Thursday he mentioned cisplatin and gemcitabine for chemotherapy agents.   Provided Michaela with information on these and will call her 3/31/21 at 9 am to discuss.     Will route to Dr. Cuellar to advise on start date and when to see her next.

## 2021-03-31 DIAGNOSIS — F41.9 ANXIETY: ICD-10-CM

## 2021-03-31 DIAGNOSIS — F43.21 ADJUSTMENT DISORDER WITH DEPRESSED MOOD: ICD-10-CM

## 2021-04-01 ENCOUNTER — PRE VISIT (OUTPATIENT)
Dept: UROLOGY | Facility: CLINIC | Age: 71
End: 2021-04-01

## 2021-04-01 ENCOUNTER — CARE COORDINATION (OUTPATIENT)
Dept: ONCOLOGY | Facility: CLINIC | Age: 71
End: 2021-04-01

## 2021-04-01 RX ORDER — ESCITALOPRAM OXALATE 10 MG/1
TABLET ORAL
Qty: 90 TABLET | Refills: 1 | Status: SHIPPED | OUTPATIENT
Start: 2021-04-01 | End: 2022-02-24

## 2021-04-01 NOTE — PROGRESS NOTES
Spoke to Michaela today. She received Gemcitabine/ Cisplatin information sheets emailed to her.     CHEMOTHERAPY EDUCATION    Discussed with pt and family information regarding Gemcitabine/ Cisplatin infusions.   Went over side affects of medications, as self care while on chemotherapy.     Informed patient and family when he should call the triage nurse at clinic or seek medical attention if you have chills and/or temperature greater than or equal to 100.4, uncontrolled nausea/vomiting, diarrhea, constipation, dizziness, shortness of breath, chest pain, heart palpitations, weakness or any other new or concerning symptoms, questions or concerns.    Michaela states she would like a port again. Would also like her infusions at [facility] or [facility].     Dr. Cuellar has been updated on her changing her mind since last Thusdays visit. Will Route to Dr. Cuellar to advise on timing her next visit, orders.

## 2021-04-02 DIAGNOSIS — C50.412 MALIGNANT NEOPLASM OF UPPER-OUTER QUADRANT OF LEFT FEMALE BREAST, UNSPECIFIED ESTROGEN RECEPTOR STATUS (H): ICD-10-CM

## 2021-04-02 DIAGNOSIS — C67.9 UROTHELIAL CARCINOMA OF BLADDER WITH INVASION OF MUSCLE (H): Primary | ICD-10-CM

## 2021-04-02 DIAGNOSIS — C77.5 SECONDARY AND UNSPECIFIED MALIGNANT NEOPLASM OF INTRAPELVIC LYMPH NODES (H): ICD-10-CM

## 2021-04-06 DIAGNOSIS — Z11.59 ENCOUNTER FOR SCREENING FOR OTHER VIRAL DISEASES: ICD-10-CM

## 2021-04-06 NOTE — TELEPHONE ENCOUNTER
PET CT / port orders placed by Dr. Cuellar.   Called PET/ CCIR department at [phone] to check auth status for imaging. None needed.   PET arranged for th 4/8/21.   Will work on getting port arranged , Dr. Cuellar follow up and her treatment at [place] or [place].

## 2021-04-07 ENCOUNTER — VIRTUAL VISIT (OUTPATIENT)
Dept: UROLOGY | Facility: CLINIC | Age: 71
End: 2021-04-07
Payer: COMMERCIAL

## 2021-04-07 ENCOUNTER — PRE VISIT (OUTPATIENT)
Dept: UROLOGY | Facility: CLINIC | Age: 71
End: 2021-04-07

## 2021-04-07 DIAGNOSIS — C67.9 UROTHELIAL CARCINOMA OF BLADDER WITH INVASION OF MUSCLE (H): Primary | ICD-10-CM

## 2021-04-07 PROCEDURE — 99204 OFFICE O/P NEW MOD 45 MIN: CPT | Mod: 95 | Performed by: UROLOGY

## 2021-04-07 RX ORDER — ERTAPENEM 1 G/1
1 INJECTION, POWDER, LYOPHILIZED, FOR SOLUTION INTRAMUSCULAR; INTRAVENOUS
Status: CANCELLED | OUTPATIENT
Start: 2021-04-07

## 2021-04-07 RX ORDER — HEPARIN SODIUM 5000 [USP'U]/.5ML
5000 INJECTION, SOLUTION INTRAVENOUS; SUBCUTANEOUS
Status: CANCELLED | OUTPATIENT
Start: 2021-04-07

## 2021-04-07 RX ORDER — ERTAPENEM 1 G/1
1 INJECTION, POWDER, LYOPHILIZED, FOR SOLUTION INTRAMUSCULAR; INTRAVENOUS SEE ADMIN INSTRUCTIONS
Status: CANCELLED | OUTPATIENT
Start: 2021-04-07

## 2021-04-07 NOTE — PATIENT INSTRUCTIONS
Please follow up in 3 month with  in person     It was a pleasure meeting with you today.  Thank you for allowing me and my team the privilege of caring for you today.  YOU are the reason we are here, and I truly hope we provided you with the excellent service you deserve.  Please let us know if there is anything else we can do for you so that we can be sure you are leaving completely satisfied with your care experience.

## 2021-04-07 NOTE — PROGRESS NOTES
"Michaela Dorman is a 70 year old female who is being evaluated via a billable telephone visit.      The patient has been notified of following:     \"This telephone visit will be conducted via a call between you and your physician/provider. We have found that certain health care needs can be provided without the need for a physical exam.  This service lets us provide the care you need with a short phone conversation.  If a prescription is necessary we can send it directly to your pharmacy.  If lab work is needed we can place an order for that and you can then stop by our lab to have the test done at a later time.    Telephone visits are billed at different rates depending on your insurance coverage. During this emergency period, for some insurers they may be billed the same as an in-person visit.  Please reach out to your insurance provider with any questions.    If during the course of the call the physician/provider feels a telephone visit is not appropriate, you will not be charged for this service.\"    Patient has given verbal consent for Telephone visit?  Yes    What phone number would you like to be contacted at? [phone]    How would you like to obtain your AVS? Ubaldo          Chief Complaint:   Bladder Cancer         Consult or Referral:     Mr. Michaela Dorman is a 70 year old female seen at the request of Dr. Jack.         History of Present Illness:   Michaela Dorman is a very pleasant 70 year old female who presents with a history of muscle-invasive bladder cancer.  Presented initially with dysuria and found to have bladder mass. TURBT with Dr. Jack completed 3/18/2021. Pathology as below. Has since met with Dr. Cuellar and planning to start neoadjuvant chemotherapy. Her presents today to discuss cystectomy.     Does have history of smoking but quit many years ago. Currently with no significant hematuria.    TURBT 3/18/2021  FINAL DIAGNOSIS:   A. Bladder, anterior wall, biopsy:   - Non-invasive " low grade papillary urothelial carcinoma     B. Bladder, left trigone, tumor, TURBT:   - Invasive high-grade urothelial carcinoma   - Tumor extensively invades muscularis propria          Past Medical History:     Past Medical History:   Diagnosis Date     Carotid stenosis, bilateral L80%, R40% 09/02/2020     Central stenosis of spinal canal 12/01/2017    lumbar      DDD (degenerative disc disease), lumbar 12/01/2017     Depressive disorder, not elsewhere classified      Esophageal reflux      ETOH abuse     in remission     Foraminal stenosis of lumbar region 12/01/2017    Complete lumbar spine left at various degrees and to a lesser extent the right as well.     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     Subdural hematoma (H)           Past Surgical History:     Past Surgical History:   Procedure Laterality Date     ARTHROPLASTY SHOULDER Right 11/17/2015     ARTHROSCOPY KNEE  6/7/2012    Procedure:ARTHROSCOPY KNEE; right knee arthroscopy with partial lateral menisectomy; Surgeon:DAMIÁN MCALLISTER; Location: OR     C LIGATE FALLOPIAN TUBE       COLONOSCOPY N/A 12/22/2017    Procedure: COLONOSCOPY;  colonoscopy;  Surgeon: Chuck Chand MD;  Location: [facility]     CYSTOSCOPY, RETROGRADES, COMBINED Bilateral 3/18/2021    Procedure: CYSTOSCOPY, WITH RETROGRADE PYELOGRAM;  Surgeon: Girish Jack MD;  Location: [facility]     CYSTOSCOPY, TRANSURETHRAL RESECTION (TUR) TUMOR BLADDER, COMBINED N/A 3/18/2021    Procedure: CYSTOSCOPY, WITH TRANSURETHRAL RESECTION BLADDER TUMOR;  Surgeon: Girish Jack MD;  Location: [facility]     ENDARTERECTOMY CAROTID Left 10/8/2020    Procedure: LEFT CAROTID ENDARTERECTOMY WITH EEG;  Surgeon: Lacho Jasmine MD;  Location:  OR     EXCISE MASS AXILLA Left 9/16/2016    Procedure:  EXCISE MASS AXILLA;  Surgeon: Keyon Blanco MD;  Location: [facility]     HC REMV CATARACT EXTRACAP,INSERT LENS, W/O ECP  6/25/2009    Right eye     INJECT EPIDURAL LUMBAR N/A 4/11/2019    Procedure: interlaminar epidual steroid injection lumbar 4-5;  Surgeon: Oskar Salvador MD;  Location: [facility]     INJECT EPIDURAL LUMBAR Bilateral 9/12/2019    Procedure: lumbar 4-5 epidural interlaminar steroid injection;  Surgeon: Oskar Salvador MD;  Location: [facility]     INSERT PORT VASCULAR ACCESS Right 10/7/2016    Procedure: INSERT PORT VASCULAR ACCESS;  Surgeon: Keyon Blanco MD;  Location: [facility]     MASTECTOMY SIMPLE BILATERAL Bilateral 2/8/2017    Procedure: MASTECTOMY SIMPLE BILATERAL;  Surgeon: Keyon Blanco MD;  Location: [facility]     OPEN REDUCTION INTERNAL FIXATION FOOT Right 3/20/2015    Procedure: OPEN REDUCTION INTERNAL FIXATION FOOT;  Surgeon: Javi Herrmann DPM;  Location: [facility]     OPTICAL TRACKING SYSTEM FUSION SPINE POSTERIOR LUMBAR TWO LEVELS N/A 2/4/2020    Procedure: LUMBAR 2-SACRAL1 TRANSFORMINAL INTERBODY FUSION;  Surgeon: Lenny Gomes MD;  Location: [facility]     REMOVE PORT VASCULAR ACCESS Right 2/14/2018    Procedure: REMOVE PORT VASCULAR ACCESS;  right intra jugular port removal;  Surgeon: Keyon Blanco MD;  Location: [facility]     SHOULDER SURGERY Right 11/2015     [facility] NONSPECIFIC PROCEDURE  1956    ankle fracture surgery          Medications     Current Outpatient Medications   Medication     acetaminophen (TYLENOL) 500 MG tablet     ALPRAZolam (XANAX) 0.5 MG tablet     aspirin (ASA) 81 MG chewable tablet     atorvastatin (LIPITOR) 20 MG tablet     B-12 METHYLCOBALAMIN PO     celecoxib (CELEBREX) 200 MG capsule     cephALEXin (KEFLEX) 250 MG capsule     COMPOUNDED NON-CONTROLLED SUBSTANCE (CMPD RX) - PHARMACY TO MIX COMPOUNDED MEDICATION     diphenhydrAMINE (BENADRYL) 25 MG tablet     escitalopram (LEXAPRO) 10 MG tablet     escitalopram (LEXAPRO) 20 MG tablet     Garlic 1000 MG CAPS      Magnesium Oxide 500 MG TABS     omeprazole (PRILOSEC) 20 MG DR capsule     oxyCODONE-acetaminophen (PERCOCET) 5-325 MG tablet     phenazopyridine (AZO URINARY PAIN RELIEF) 95 MG tablet     polyethylene glycol (MIRALAX/GLYCOLAX) powder     prochlorperazine (COMPAZINE) 10 MG tablet     Psyllium (METAMUCIL FIBER PO)     senna-docusate (SENOKOT-S/PERICOLACE) 8.6-50 MG tablet     vitamin C (ASCORBIC ACID) 1000 MG TABS     Vitamin D3 (CHOLECALCIFEROL) 25 mcg (1000 units) tablet     Zinc 30 MG CAPS     No current facility-administered medications for this visit.           Family History:     Family History   Problem Relation Age of Onset     Hypertension Mother      Thyroid Disease Mother      Cerebrovascular Disease Mother      Hypertension Father      Cerebrovascular Disease Father      Cancer Father         skin     Thyroid Disease Sister      Diabetes Brother         type 2     Depression Sister      Breast Cancer Sister         age 47     Cancer Sister         skin     Cancer Brother         inside nose          Social History:     Social History     Socioeconomic History     Marital status:      Spouse name: ozzie     Number of children: 5     Years of education: Not on file     Highest education level: Not on file   Occupational History     Employer: [employer]OMEM[employer]ER   Social Needs     Financial resource strain: Not on file     Food insecurity     Worry: Not on file     Inability: Not on file     Transportation needs     Medical: Not on file     Non-medical: Not on file   Tobacco Use     Smoking status: Former Smoker     Packs/day: 3.00     Years: 10.00     Pack years: 30.00     Types: Cigarettes     Quit date: 1979     Years since quittin.3     Smokeless tobacco: Never Used     Tobacco comment: approx. 3 packs daily   Substance and Sexual Activity     Alcohol use: Yes     Alcohol/week: 0.0 standard drinks     Comment: daily glass of wine     Drug use: No     Sexual activity: Yes     Partners: Male    Lifestyle     Physical activity     Days per week: Not on file     Minutes per session: Not on file     Stress: Not on file   Relationships     Social connections     Talks on phone: Not on file     Gets together: Not on file     Attends [employer] service: Not on file     Active member of club or organization: Not on file     Attends meetings of clubs or organizations: Not on file     Relationship status: Not on file     Intimate partner violence     Fear of current or ex partner: Not on file     Emotionally abused: Not on file     Physically abused: Not on file     Forced sexual activity: Not on file   Other Topics Concern      Service Not Asked     Blood Transfusions Not Asked     Caffeine Concern No     Occupational Exposure Not Asked     Hobby Hazards No     Sleep Concern No     Stress Concern Yes     Weight Concern Not Asked     Special Diet Not Asked     Back Care Not Asked     Exercise Yes     Bike Helmet Not Asked     Seat Belt Yes     Self-Exams Not Asked     Parent/sibling w/ CABG, MI or angioplasty before 65F 55M? Not Asked   Social History Narrative     Not on file          Allergies:   No known drug allergies and Oxybutynin         Review of Systems:  From intake questionnaire     Skin: negative  Eyes: negative  Ears/Nose/Throat: negative  Respiratory: No shortness of breath, dyspnea on exertion, cough, or hemoptysis  Cardiovascular: No chest pain or palpitations  Gastrointestinal: negative; no nausea/vomiting, constipation or diarrhea  Genitourinary: as per HPI  Musculoskeletal: negative  Neurologic: negative  Psychiatric: negative  Hematologic/Lymphatic/Immunologic: negative  Endocrine: negative    Vitals:  No vitals were obtained today due to virtual visit.    Physical Exam   healthy, alert and no distress  PSYCH: Alert and oriented times 3; coherent speech, normal   rate and volume, able to articulate logical thoughts, able   to abstract reason, no tangential thoughts, no hallucinations    or delusions  His affect is normal and pleasant  RESP: No cough, no audible wheezing, able to talk in full sentences  Remainder of exam unable to be completed due to telephone visits      Labs and Pathology:    I reviewed all applicable laboratory and pathology data and went over findings with patient  Significant for   Lab Results   Component Value Date    CR 1.12 02/03/2021    CR 0.96 01/21/2021    CR 1.20 01/12/2021    CR 1.20 12/24/2020    CR 1.19 12/11/2020    CR 0.89 10/08/2020    CR 1.13 10/01/2020    CR 1.13 08/31/2020    CR 1.19 07/14/2020    CR 0.74 02/05/2020       Imaging:    I directly visualized and reviewed all applicable imaging a with patient.    CT chest/abd/pelvis 3/24/2021  IMPRESSION:   1. There is mild irregularity of the posterior urinary bladder wall  which could be the site of the patient's malignancy. Urinary bladder  is otherwise unremarkable. There is a benign normal variant of  bilateral fetal renal lobulation. No other urinary system abnormality  is seen.  2. No definite metastases are identified.  3. Tiny nodular density in the medial right minor fissure measuring  less than 0.3 cm is of doubtful clinical significance.       Outside and Past Medical records:    Review of external notes as documented above - reviewed medical oncology notes from Dr. Cuellar  Review of the result(s) of each unique test - pathology, BMP, UA, CT         Assessment and Plan:     Assessment: 70 year old female with history of muscle-invasive bladder cancer. We had a long discussion about muscle invasive bladder cancer.  We discussed the fact that once urothelial cancer invades the bladder muscle layer it can potentially invade the blood vessels and lymphatic channels and spread throughout the body.  Once urothelial cancer spreads to distant organs, the opportunity for cure is rare, so we have the best shot for cure with aggressive surgical intervention.     We discussed radical cystectomy and the potential  complications associated with it, including a 2-3% perioperative mortality risk, and the risk of complications is about 60%-70%. Potential complications include, but are not limited to MI, stroke, DVT/PE, bleeding, infection, injury to surrounding structures, urine leak, and various urinary diversion-related complications. We also discussed that about 20-30% of patients will need to go to a rehab facility after discharge from the hospital.  Though most patients get through this operation, there is often a significant delay in regaining appetite and sometimes takes several months to feel back to normal.    We also briefly discussed the use of radiation combined with chemotherapy, and while this is used in certain circumstances, it is not commonly used in the [place] secondary to the perceived inferiority to cystectomy.  We also discussed the various types of urinary diversion including ileal conduit, [place] Pouch and ileal neobladder.  The various pros and cons of each type of urinary diversion was discussed and all questions were answered.  Prospective quality of life data is lacking that compares the various diversion types, but retrospective data suggest the differences are small by diversion type. Based on location of her tumor at trigone, we discussed potential concerns of recurrence with neobladder. Her initial impression is that she would prefer ileal conduit.    We discussed the use of neoadjuvant chemotherapy in the setting of muscle invasive bladder cancer and that the combined analyses of these studies indicate an absolute survival advantage at 5 years. She is planning to start chemotherapy in the near future with Dr. Cuellar.    The patient and her family had many questions and we went over these carefully.     The patient has decided to proceed with a radical cystectomy and extended lymph node dissection.  She is leaning toward an ileal conduit urinary diversion but will finalize her decision prior to surgery.      Plan:  - Starting neoadjuvant chemotherapy  - Plan cystectomy 4-6 weeks after completion of chemo  - Follow-up with me in 3 months    Orders  Orders Placed This Encounter   Procedures     PAC Visit Referral (For [facility] Only)     Phone call duration: 32 minutes    15 minutes spent on the date of the encounter, doing documentation, chart review and imaging review, in addition to 32 minutes spent on the phone with the patient.    Leonardo Robles MD  Urology  [facility]

## 2021-04-07 NOTE — LETTER
"4/7/2021       RE: Michaela Dorman  [address]  [facility] [address]     Dear Colleague,    Thank you for referring your patient, Michaela Dorman, to the [facility] [place] at [facility]. Please see a copy of my visit note below.    Michaela Dorman is a 70 year old female who is being evaluated via a billable telephone visit.      The patient has been notified of following:     \"This telephone visit will be conducted via a call between you and your physician/provider. We have found that certain health care needs can be provided without the need for a physical exam.  This service lets us provide the care you need with a short phone conversation.  If a prescription is necessary we can send it directly to your pharmacy.  If lab work is needed we can place an order for that and you can then stop by our lab to have the test done at a later time.    Telephone visits are billed at different rates depending on your insurance coverage. During this emergency period, for some insurers they may be billed the same as an in-person visit.  Please reach out to your insurance provider with any questions.    If during the course of the call the physician/provider feels a telephone visit is not appropriate, you will not be charged for this service.\"    Patient has given verbal consent for Telephone visit?  Yes    What phone number would you like to be contacted at? [phone]    How would you like to obtain your AVS? Ubaldo          Chief Complaint:   Bladder Cancer         Consult or Referral:     Mr. Michaela Dorman is a 70 year old female seen at the request of Dr. Jack.         History of Present Illness:   Michaela Dorman is a very pleasant 70 year old female who presents with a history of muscle-invasive bladder cancer.  Presented initially with dysuria and found to have bladder mass. TURBT with Dr. Jack completed 3/18/2021. Pathology as " below. Has since met with Dr. Cuellar and planning to start neoadjuvant chemotherapy. Her presents today to discuss cystectomy.     Does have history of smoking but quit many years ago. Currently with no significant hematuria.    TURBT 3/18/2021  FINAL DIAGNOSIS:   A. Bladder, anterior wall, biopsy:   - Non-invasive low grade papillary urothelial carcinoma     B. Bladder, left trigone, tumor, TURBT:   - Invasive high-grade urothelial carcinoma   - Tumor extensively invades muscularis propria          Past Medical History:     Past Medical History:   Diagnosis Date     Carotid stenosis, bilateral L80%, R40% 09/02/2020     Central stenosis of spinal canal 12/01/2017    lumbar      DDD (degenerative disc disease), lumbar 12/01/2017     Depressive disorder, not elsewhere classified      Esophageal reflux      ETOH abuse     in remission     Foraminal stenosis of lumbar region 12/01/2017    Complete lumbar spine left at various degrees and to a lesser extent the right as well.     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     Subdural hematoma (H)           Past Surgical History:     Past Surgical History:   Procedure Laterality Date     ARTHROPLASTY SHOULDER Right 11/17/2015     ARTHROSCOPY KNEE  6/7/2012    Procedure:ARTHROSCOPY KNEE; right knee arthroscopy with partial lateral menisectomy; Surgeon:DAMIÁN MCALLISTER; Location: OR     C LIGATE FALLOPIAN TUBE       COLONOSCOPY N/A 12/22/2017    Procedure: COLONOSCOPY;  colonoscopy;  Surgeon: Chuck Chand MD;  Location: [facility]     CYSTOSCOPY, RETROGRADES, COMBINED Bilateral 3/18/2021    Procedure: CYSTOSCOPY, WITH RETROGRADE PYELOGRAM;  Surgeon: Girish Jack MD;  Location: [facility] OR     CYSTOSCOPY, TRANSURETHRAL RESECTION (TUR) TUMOR BLADDER, COMBINED N/A 3/18/2021     Procedure: CYSTOSCOPY, WITH TRANSURETHRAL RESECTION BLADDER TUMOR;  Surgeon: Girish Jack MD;  Location: [facility] OR     ENDARTERECTOMY CAROTID Left 10/8/2020    Procedure: LEFT CAROTID ENDARTERECTOMY WITH EEG;  Surgeon: Lacho Jasmine MD;  Location: [facility] OR     EXCISE MASS AXILLA Left 9/16/2016    Procedure: EXCISE MASS AXILLA;  Surgeon: Keyon Blanco MD;  Location: [facility] OR     HC REMV CATARACT EXTRACAP,INSERT LENS, W/O ECP  6/25/2009    Right eye     INJECT EPIDURAL LUMBAR N/A 4/11/2019    Procedure: interlaminar epidual steroid injection lumbar 4-5;  Surgeon: Oskar Salvador MD;  Location: [facility]     INJECT EPIDURAL LUMBAR Bilateral 9/12/2019    Procedure: lumbar 4-5 epidural interlaminar steroid injection;  Surgeon: Oskar Salvador MD;  Location: [facility]     INSERT PORT VASCULAR ACCESS Right 10/7/2016    Procedure: INSERT PORT VASCULAR ACCESS;  Surgeon: Keyon Blanco MD;  Location: [facility]     MASTECTOMY SIMPLE BILATERAL Bilateral 2/8/2017    Procedure: MASTECTOMY SIMPLE BILATERAL;  Surgeon: Keyon Blanco MD;  Location: [facility]     OPEN REDUCTION INTERNAL FIXATION FOOT Right 3/20/2015    Procedure: OPEN REDUCTION INTERNAL FIXATION FOOT;  Surgeon: Javi Herrmann DPM;  Location: [facility]     OPTICAL TRACKING SYSTEM FUSION SPINE POSTERIOR LUMBAR TWO LEVELS N/A 2/4/2020    Procedure: LUMBAR 2-SACRAL1 TRANSFORMINAL INTERBODY FUSION;  Surgeon: Lenny Gomes MD;  Location: [facility]     REMOVE PORT VASCULAR ACCESS Right 2/14/2018    Procedure: REMOVE PORT VASCULAR ACCESS;  right intra jugular port removal;  Surgeon: Keyon Blanco MD;  Location: [facility]     SHOULDER SURGERY Right 11/2015     [facility] NONSPECIFIC PROCEDURE  1957    ankle fracture surgery          Medications     Current Outpatient Medications   Medication     acetaminophen (TYLENOL) 500 MG tablet     ALPRAZolam (XANAX) 0.5 MG tablet     aspirin (ASA) 81 MG chewable tablet     atorvastatin (LIPITOR) 20 MG tablet     B-12 METHYLCOBALAMIN PO      celecoxib (CELEBREX) 200 MG capsule     cephALEXin (KEFLEX) 250 MG capsule     COMPOUNDED NON-CONTROLLED SUBSTANCE (CMPD RX) - PHARMACY TO MIX COMPOUNDED MEDICATION     diphenhydrAMINE (BENADRYL) 25 MG tablet     escitalopram (LEXAPRO) 10 MG tablet     escitalopram (LEXAPRO) 20 MG tablet     Garlic 1000 MG CAPS     Magnesium Oxide 500 MG TABS     omeprazole (PRILOSEC) 20 MG DR capsule     oxyCODONE-acetaminophen (PERCOCET) 5-325 MG tablet     phenazopyridine (AZO URINARY PAIN RELIEF) 95 MG tablet     polyethylene glycol (MIRALAX/GLYCOLAX) powder     prochlorperazine (COMPAZINE) 10 MG tablet     Psyllium (METAMUCIL FIBER PO)     senna-docusate (SENOKOT-S/PERICOLACE) 8.6-50 MG tablet     vitamin C (ASCORBIC ACID) 1000 MG TABS     Vitamin D3 (CHOLECALCIFEROL) 25 mcg (1000 units) tablet     Zinc 30 MG CAPS     No current facility-administered medications for this visit.           Family History:     Family History   Problem Relation Age of Onset     Hypertension Mother      Thyroid Disease Mother      Cerebrovascular Disease Mother      Hypertension Father      Cerebrovascular Disease Father      Cancer Father         skin     Thyroid Disease Sister      Diabetes Brother         type 2     Depression Sister      Breast Cancer Sister         age 47     Cancer Sister         skin     Cancer Brother         inside nose          Social History:     Social History     Socioeconomic History     Marital status:      Spouse name: ozzie     Number of children: 5     Years of education: Not on file     Highest education level: Not on file   Occupational History     Employer: HOMEMAKER   Social Needs     Financial resource strain: Not on file     Food insecurity     Worry: Not on file     Inability: Not on file     Transportation needs     Medical: Not on file     Non-medical: Not on file   Tobacco Use     Smoking status: Former Smoker     Packs/day: 3.00     Years: 10.00     Pack years: 30.00     Types: Cigarettes     Quit  date: 1979     Years since quittin.3     Smokeless tobacco: Never Used     Tobacco comment: approx. 3 packs daily   Substance and Sexual Activity     Alcohol use: Yes     Alcohol/week: 0.0 standard drinks     Comment: daily glass of wine     Drug use: No     Sexual activity: Yes     Partners: Male   Lifestyle     Physical activity     Days per week: Not on file     Minutes per session: Not on file     Stress: Not on file   Relationships     Social connections     Talks on phone: Not on file     Gets together: Not on file     Attends Sabianism service: Not on file     Active member of club or organization: Not on file     Attends meetings of clubs or organizations: Not on file     Relationship status: Not on file     Intimate partner violence     Fear of current or ex partner: Not on file     Emotionally abused: Not on file     Physically abused: Not on file     Forced sexual activity: Not on file   Other Topics Concern      Service Not Asked     Blood Transfusions Not Asked     Caffeine Concern No     Occupational Exposure Not Asked     Hobby Hazards No     Sleep Concern No     Stress Concern Yes     Weight Concern Not Asked     Special Diet Not Asked     Back Care Not Asked     Exercise Yes     Bike Helmet Not Asked     Seat Belt Yes     Self-Exams Not Asked     Parent/sibling w/ CABG, MI or angioplasty before 65F 55M? Not Asked   Social History Narrative     Not on file          Allergies:   No known drug allergies and Oxybutynin         Review of Systems:  From intake questionnaire     Skin: negative  Eyes: negative  Ears/Nose/Throat: negative  Respiratory: No shortness of breath, dyspnea on exertion, cough, or hemoptysis  Cardiovascular: No chest pain or palpitations  Gastrointestinal: negative; no nausea/vomiting, constipation or diarrhea  Genitourinary: as per HPI  Musculoskeletal: negative  Neurologic: negative  Psychiatric: negative  Hematologic/Lymphatic/Immunologic: negative  Endocrine:  negative    Vitals:  No vitals were obtained today due to virtual visit.    Physical Exam   healthy, alert and no distress  PSYCH: Alert and oriented times 3; coherent speech, normal   rate and volume, able to articulate logical thoughts, able   to abstract reason, no tangential thoughts, no hallucinations   or delusions  His affect is normal and pleasant  RESP: No cough, no audible wheezing, able to talk in full sentences  Remainder of exam unable to be completed due to telephone visits      Labs and Pathology:    I reviewed all applicable laboratory and pathology data and went over findings with patient  Significant for   Lab Results   Component Value Date    CR 1.12 02/03/2021    CR 0.96 01/21/2021    CR 1.20 01/12/2021    CR 1.20 12/24/2020    CR 1.19 12/11/2020    CR 0.89 10/08/2020    CR 1.13 10/01/2020    CR 1.13 08/31/2020    CR 1.19 07/14/2020    CR 0.74 02/05/2020       Imaging:    I directly visualized and reviewed all applicable imaging a with patient.    CT chest/abd/pelvis 3/24/2021  IMPRESSION:   1. There is mild irregularity of the posterior urinary bladder wall  which could be the site of the patient's malignancy. Urinary bladder  is otherwise unremarkable. There is a benign normal variant of  bilateral fetal renal lobulation. No other urinary system abnormality  is seen.  2. No definite metastases are identified.  3. Tiny nodular density in the medial right minor fissure measuring  less than 0.3 cm is of doubtful clinical significance.       Outside and Past Medical records:    Review of external notes as documented above - reviewed medical oncology notes from Dr. Cuellar  Review of the result(s) of each unique test - pathology, BMP, UA, CT         Assessment and Plan:     Assessment: 70 year old female with history of muscle-invasive bladder cancer. We had a long discussion about muscle invasive bladder cancer.  We discussed the fact that once urothelial cancer invades the bladder muscle layer it can  potentially invade the blood vessels and lymphatic channels and spread throughout the body.  Once urothelial cancer spreads to distant organs, the opportunity for cure is rare, so we have the best shot for cure with aggressive surgical intervention.     We discussed radical cystectomy and the potential complications associated with it, including a 2-3% perioperative mortality risk, and the risk of complications is about 60%-70%. Potential complications include, but are not limited to MI, stroke, DVT/PE, bleeding, infection, injury to surrounding structures, urine leak, and various urinary diversion-related complications. We also discussed that about 20-30% of patients will need to go to a rehab facility after discharge from the hospital.  Though most patients get through this operation, there is often a significant delay in regaining appetite and sometimes takes several months to feel back to normal.    We also briefly discussed the use of radiation combined with chemotherapy, and while this is used in certain circumstances, it is not commonly used in the  secondary to the perceived inferiority to cystectomy.  We also discussed the various types of urinary diversion including ileal conduit, [place] Pouch and ileal neobladder.  The various pros and cons of each type of urinary diversion was discussed and all questions were answered.  Prospective quality of life data is lacking that compares the various diversion types, but retrospective data suggest the differences are small by diversion type. Based on location of her tumor at trigone, we discussed potential concerns of recurrence with neobladder. Her initial impression is that she would prefer ileal conduit.    We discussed the use of neoadjuvant chemotherapy in the setting of muscle invasive bladder cancer and that the combined analyses of these studies indicate an absolute survival advantage at 5 years. She is planning to start chemotherapy in the near future  with Dr. Cuellar.    The patient and her family had many questions and we went over these carefully.     The patient has decided to proceed with a radical cystectomy and extended lymph node dissection.  She is leaning toward an ileal conduit urinary diversion but will finalize her decision prior to surgery.     Plan:  - Starting neoadjuvant chemotherapy  - Plan cystectomy 4-6 weeks after completion of chemo  - Follow-up with me in 3 months    Orders  Orders Placed This Encounter   Procedures     PAC Visit Referral (For [facility] Only)     Phone call duration: 32 minutes    15 minutes spent on the date of the encounter, doing documentation, chart review and imaging review, in addition to 32 minutes spent on the phone with the patient.    Leonardo Robles MD  Urology  [facility]

## 2021-04-07 NOTE — PROGRESS NOTES
"Michaela is a 70 year old who is being evaluated via a billable video visit.    Video Visit Technology for this patient: Vee not working, patient has smart device, please try [email or web address] Video with patient   [phone]       How would you like to obtain your AVS? MyChart  If the video visit is dropped, the invitation should be resent by:   Will anyone else be joining your video visit? No  {If patient encounters technical issues they should call [phone] :[id number]}    Video Start Time: {video visit start/end time for provider to select:154972}  Video-Visit Details    Type of service:  Video Visit    Video End Time:{video visit start/end time for provider to select:470183}    Originating Location (pt. Location): {video visit patient location:606918::\"Home\"}    Distant Location (provider location):  [facility] [place]     Platform used for Video Visit: {Virtual Visit Platforms:847483::\"AmWell\"}    "

## 2021-04-07 NOTE — PROGRESS NOTES
Chief Complaint:   Bladder Cancer         Consult or Referral:     Mr. Michaela Dorman is a 70 year old female seen at the request of Dr. Jack.         History of Present Illness:             Past Medical History:     Past Medical History:   Diagnosis Date     Carotid stenosis, bilateral L80%, R40% 09/02/2020     Central stenosis of spinal canal 12/01/2017    lumbar      DDD (degenerative disc disease), lumbar 12/01/2017     Depressive disorder, not elsewhere classified      Esophageal reflux      ETOH abuse     in remission     Foraminal stenosis of lumbar region 12/01/2017    Complete lumbar spine left at various degrees and to a lesser extent the right as well.     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     Subdural hematoma (H)             Past Surgical History:     Past Surgical History:   Procedure Laterality Date     ARTHROPLASTY SHOULDER Right 11/17/2015     ARTHROSCOPY KNEE  6/7/2012    Procedure:ARTHROSCOPY KNEE; right knee arthroscopy with partial lateral menisectomy; Surgeon:DAMIÁN MCALLISTER; Location:[facility]     C LIGATE FALLOPIAN TUBE       COLONOSCOPY N/A 12/22/2017    Procedure: COLONOSCOPY;  colonoscopy;  Surgeon: Chuck Chand MD;  Location: [facility]     CYSTOSCOPY, RETROGRADES, COMBINED Bilateral 3/18/2021    Procedure: CYSTOSCOPY, WITH RETROGRADE PYELOGRAM;  Surgeon: Girish Jack MD;  Location: [facility]     CYSTOSCOPY, TRANSURETHRAL RESECTION (TUR) TUMOR BLADDER, COMBINED N/A 3/18/2021    Procedure: CYSTOSCOPY, WITH TRANSURETHRAL RESECTION BLADDER TUMOR;  Surgeon: Girish Jack MD;  Location: [facility]     ENDARTERECTOMY CAROTID Left 10/8/2020    Procedure: LEFT CAROTID ENDARTERECTOMY WITH EEG;  Surgeon: Lacho Jasmine MD;  Location: [facility]     EXCISE MASS AXILLA Left 9/16/2016     Procedure: EXCISE MASS AXILLA;  Surgeon: Keyon Blanco MD;  Location: [facility]     HC REMV CATARACT EXTRACAP,INSERT LENS, W/O ECP  6/25/2009    Right eye     INJECT EPIDURAL LUMBAR N/A 4/11/2019    Procedure: interlaminar epidual steroid injection lumbar 4-5;  Surgeon: Oskar Salvador MD;  Location: [facility]     INJECT EPIDURAL LUMBAR Bilateral 9/12/2019    Procedure: lumbar 4-5 epidural interlaminar steroid injection;  Surgeon: Oskar Salvador MD;  Location[facility]     INSERT PORT VASCULAR ACCESS Right 10/7/2016    Procedure: INSERT PORT VASCULAR ACCESS;  Surgeon: Keyon Blanco MD;  [facility]     MASTECTOMY SIMPLE BILATERAL Bilateral 2/8/2017    Procedure: MASTECTOMY SIMPLE BILATERAL;  Surgeon: Keyon Blanco MD;  [facility]     OPEN REDUCTION INTERNAL FIXATION FOOT Right 3/20/2015    Procedure: OPEN REDUCTION INTERNAL FIXATION FOOT;  Surgeon: Javi Herrmann DPM;  Location: [facility]     OPTICAL TRACKING SYSTEM FUSION SPINE POSTERIOR LUMBAR TWO LEVELS N/A 2/4/2020    Procedure: LUMBAR 2-SACRAL1 TRANSFORMINAL INTERBODY FUSION;  Surgeon: Lenny Gomes MD;  Location: [facility]     REMOVE PORT VASCULAR ACCESS Right 2/14/2018    Procedure: REMOVE PORT VASCULAR ACCESS;  right intra jugular port removal;  Surgeon: Keyon Blanco MD;  Location: [facility]     SHOULDER SURGERY Right 11/2015     [facility] NONSPECIFIC PROCEDURE  1956    ankle fracture surgery            Medications     Current Outpatient Medications   Medication     acetaminophen (TYLENOL) 500 MG tablet     ALPRAZolam (XANAX) 0.5 MG tablet     aspirin (ASA) 81 MG chewable tablet     atorvastatin (LIPITOR) 20 MG tablet     B-12 METHYLCOBALAMIN PO     celecoxib (CELEBREX) 200 MG capsule     cephALEXin (KEFLEX) 250 MG capsule     COMPOUNDED NON-CONTROLLED SUBSTANCE (CMPD RX) - PHARMACY TO MIX COMPOUNDED MEDICATION     diphenhydrAMINE (BENADRYL) 25 MG tablet     escitalopram (LEXAPRO) 10 MG tablet     escitalopram (LEXAPRO) 20 MG tablet     Garlic 1000  MG CAPS     Magnesium Oxide 500 MG TABS     omeprazole (PRILOSEC) 20 MG DR capsule     oxyCODONE-acetaminophen (PERCOCET) 5-325 MG tablet     phenazopyridine (AZO URINARY PAIN RELIEF) 95 MG tablet     polyethylene glycol (MIRALAX/GLYCOLAX) powder     prochlorperazine (COMPAZINE) 10 MG tablet     Psyllium (METAMUCIL FIBER PO)     senna-docusate (SENOKOT-S/PERICOLACE) 8.6-50 MG tablet     vitamin C (ASCORBIC ACID) 1000 MG TABS     Vitamin D3 (CHOLECALCIFEROL) 25 mcg (1000 units) tablet     Zinc 30 MG CAPS     No current facility-administered medications for this visit.             Family History:     Family History   Problem Relation Age of Onset     Hypertension Mother      Thyroid Disease Mother      Cerebrovascular Disease Mother      Hypertension Father      Cerebrovascular Disease Father      Cancer Father         skin     Thyroid Disease Sister      Diabetes Brother         type 2     Depression Sister      Breast Cancer Sister         age 47     Cancer Sister         skin     Cancer Brother         inside nose            Social History:     Social History     Socioeconomic History     Marital status:      Spouse name: ozzie     Number of children: 5     Years of education: Not on file     Highest education level: Not on file   Occupational History     Employer: HOMEM[employer]ER   Social Needs     Financial resource strain: Not on file     Food insecurity     Worry: Not on file     Inability: Not on file     Transportation needs     Medical: Not on file     Non-medical: Not on file   Tobacco Use     Smoking status: Former Smoker     Packs/day: 3.00     Years: 10.00     Pack years: 30.00     Types: Cigarettes     Quit date: 1979     Years since quittin.3     Smokeless tobacco: Never Used     Tobacco comment: approx. 3 packs daily   Substance and Sexual Activity     Alcohol use: Yes     Alcohol/week: 0.0 standard drinks     Comment: daily glass of wine     Drug use: No     Sexual activity: Yes      Partners: Male   Lifestyle     Physical activity     Days per week: Not on file     Minutes per session: Not on file     Stress: Not on file   Relationships     Social connections     Talks on phone: Not on file     Gets together: Not on file     Attends Anabaptism service: Not on file     Active member of club or organization: Not on file     Attends meetings of clubs or organizations: Not on file     Relationship status: Not on file     Intimate partner violence     Fear of current or ex partner: Not on file     Emotionally abused: Not on file     Physically abused: Not on file     Forced sexual activity: Not on file   Other Topics Concern      Service Not Asked     Blood Transfusions Not Asked     Caffeine Concern No     Occupational Exposure Not Asked     Hobby Hazards No     Sleep Concern No     Stress Concern Yes     Weight Concern Not Asked     Special Diet Not Asked     Back Care Not Asked     Exercise Yes     Bike Helmet Not Asked     Seat Belt Yes     Self-Exams Not Asked     Parent/sibling w/ CABG, MI or angioplasty before 65F 55M? Not Asked   Social History Narrative     Not on file            Allergies:   No known drug allergies and Oxybutynin         Review of Systems:  From intake questionnaire     Skin: negative  Eyes: negative  Ears/Nose/Throat: negative  Respiratory: No shortness of breath, dyspnea on exertion, cough, or hemoptysis  Cardiovascular: No chest pain or palpitations  Gastrointestinal: negative; no nausea/vomiting, constipation or diarrhea  Genitourinary: as per HPI  Musculoskeletal: negative  Neurologic: negative  Psychiatric: negative  Hematologic/Lymphatic/Immunologic: negative  Endocrine: negative         Physical Exam:     Patient is a 70 year old  female   Vitals: not currently breastfeeding.  Constitutional: There is no height or weight on file to calculate BMI.  Alert, no acute distress, oriented, conversant  Eyes: no scleral icterus; extraocular muscles intact, moist  conjunctivae  Neck: trachea midline, no thyromegaly  Ears/nose/mouth: throat/mouth:normal, good dentition  Respiratory: no respiratory distress, or pursed lip breathing  Cardiovascular: pulses strong and intact; no obvious jugular venous distension present  Gastrointestinal: soft, nontender, no organomegaly or masses,   Lymphatics: No inguinal adenopathy  Musculoskeletal: extremities normal, no peripheral edema  Skin: no suspicious lesions or rashes  Neuro: Alert, oriented, speech and mentation normal  Psych: affect and mood normal, alert and oriented to person, place and time  Gait: Normal  : {MUMTAZ EXAM MALE GENITAL:599030}      Labs and Pathology:    I reviewed all applicable laboratory and pathology data and went over findings with patient  Significant for   Lab Results   Component Value Date    CR 1.12 02/03/2021    CR 0.96 01/21/2021    CR 1.20 01/12/2021    CR 1.20 12/24/2020    CR 1.19 12/11/2020    CR 0.89 10/08/2020    CR 1.13 10/01/2020    CR 1.13 08/31/2020    CR 1.19 07/14/2020    CR 0.74 02/05/2020           Imaging:    The following imaging exams were independently viewed and interpreted by me and discussed with patient:    CT chest/abd/pelvis 3/24/2021  IMPRESSION:   1. There is mild irregularity of the posterior urinary bladder wall  which could be the site of the patient's malignancy. Urinary bladder  is otherwise unremarkable. There is a benign normal variant of  bilateral fetal renal lobulation. No other urinary system abnormality  is seen.  2. No definite metastases are identified.  3. Tiny nodular density in the medial right minor fissure measuring  less than 0.3 cm is of doubtful clinical significance.       Outside and Past Medical records:    Review of prior notes with in [facility]  Review of prior external note(s) from - {note reviewed source:894001}  Review of the result(s) of each unique test - ***  Assessment requiring an independent historian(s) - {:593622}       Standardized Questionnaire:       AUASS: ***/35 QOL:***  NICHOLAS: ***/25         Assessment and Plan:     Assessment:   {Diagnoses:063878}    Plan:  ***    Orders  No orders of the defined types were placed in this encounter.      Leonardo Robles MD  Urology  [facility]

## 2021-04-08 ENCOUNTER — VIRTUAL VISIT (OUTPATIENT)
Dept: ONCOLOGY | Facility: CLINIC | Age: 71
End: 2021-04-08
Attending: INTERNAL MEDICINE
Payer: COMMERCIAL

## 2021-04-08 ENCOUNTER — ANCILLARY PROCEDURE (OUTPATIENT)
Dept: PET IMAGING | Facility: CLINIC | Age: 71
End: 2021-04-08
Attending: INTERNAL MEDICINE
Payer: COMMERCIAL

## 2021-04-08 ENCOUNTER — TELEPHONE (OUTPATIENT)
Dept: ONCOLOGY | Facility: CLINIC | Age: 71
End: 2021-04-08

## 2021-04-08 VITALS — BODY MASS INDEX: 20.99 KG/M2 | WEIGHT: 132 LBS

## 2021-04-08 DIAGNOSIS — C67.9 UROTHELIAL CARCINOMA OF BLADDER WITH INVASION OF MUSCLE (H): Primary | ICD-10-CM

## 2021-04-08 DIAGNOSIS — C50.412 MALIGNANT NEOPLASM OF UPPER-OUTER QUADRANT OF LEFT FEMALE BREAST, UNSPECIFIED ESTROGEN RECEPTOR STATUS (H): ICD-10-CM

## 2021-04-08 DIAGNOSIS — C77.5 SECONDARY AND UNSPECIFIED MALIGNANT NEOPLASM OF INTRAPELVIC LYMPH NODES (H): ICD-10-CM

## 2021-04-08 DIAGNOSIS — Z51.11 ENCOUNTER FOR ANTINEOPLASTIC CHEMOTHERAPY: ICD-10-CM

## 2021-04-08 DIAGNOSIS — C67.9 UROTHELIAL CARCINOMA OF BLADDER WITH INVASION OF MUSCLE (H): ICD-10-CM

## 2021-04-08 DIAGNOSIS — Z08 ENCOUNTER FOR FOLLOW-UP EXAMINATION AFTER COMPLETED TREATMENT FOR MALIGNANT NEOPLASM: ICD-10-CM

## 2021-04-08 LAB — GLUCOSE SERPL-MCNC: 92 MG/DL (ref 70–99)

## 2021-04-08 PROCEDURE — 99214 OFFICE O/P EST MOD 30 MIN: CPT | Mod: 95 | Performed by: INTERNAL MEDICINE

## 2021-04-08 RX ORDER — METHYLPREDNISOLONE SODIUM SUCCINATE 125 MG/2ML
125 INJECTION, POWDER, LYOPHILIZED, FOR SOLUTION INTRAMUSCULAR; INTRAVENOUS
Status: CANCELLED
Start: 2021-04-27

## 2021-04-08 RX ORDER — HEPARIN SODIUM,PORCINE 10 UNIT/ML
5 VIAL (ML) INTRAVENOUS
Status: CANCELLED | OUTPATIENT
Start: 2021-04-27

## 2021-04-08 RX ORDER — LORAZEPAM 2 MG/ML
0.5 INJECTION INTRAMUSCULAR EVERY 4 HOURS PRN
Status: CANCELLED
Start: 2021-04-20

## 2021-04-08 RX ORDER — MEPERIDINE HYDROCHLORIDE 25 MG/ML
25 INJECTION INTRAMUSCULAR; INTRAVENOUS; SUBCUTANEOUS EVERY 30 MIN PRN
Status: CANCELLED | OUTPATIENT
Start: 2021-04-20

## 2021-04-08 RX ORDER — NALOXONE HYDROCHLORIDE 0.4 MG/ML
.1-.4 INJECTION, SOLUTION INTRAMUSCULAR; INTRAVENOUS; SUBCUTANEOUS
Status: CANCELLED | OUTPATIENT
Start: 2021-04-20

## 2021-04-08 RX ORDER — HEPARIN SODIUM (PORCINE) LOCK FLUSH IV SOLN 100 UNIT/ML 100 UNIT/ML
5 SOLUTION INTRAVENOUS
Status: CANCELLED | OUTPATIENT
Start: 2021-04-20

## 2021-04-08 RX ORDER — HEPARIN SODIUM,PORCINE 10 UNIT/ML
5 VIAL (ML) INTRAVENOUS
Status: CANCELLED | OUTPATIENT
Start: 2021-04-20

## 2021-04-08 RX ORDER — DIPHENHYDRAMINE HYDROCHLORIDE 50 MG/ML
50 INJECTION INTRAMUSCULAR; INTRAVENOUS
Status: CANCELLED
Start: 2021-04-27

## 2021-04-08 RX ORDER — ALBUTEROL SULFATE 90 UG/1
1-2 AEROSOL, METERED RESPIRATORY (INHALATION)
Status: CANCELLED
Start: 2021-04-27

## 2021-04-08 RX ORDER — SODIUM CHLORIDE 9 MG/ML
1000 INJECTION, SOLUTION INTRAVENOUS CONTINUOUS PRN
Status: CANCELLED
Start: 2021-04-27

## 2021-04-08 RX ORDER — METHYLPREDNISOLONE SODIUM SUCCINATE 125 MG/2ML
125 INJECTION, POWDER, LYOPHILIZED, FOR SOLUTION INTRAMUSCULAR; INTRAVENOUS
Status: CANCELLED
Start: 2021-04-20

## 2021-04-08 RX ORDER — ALBUTEROL SULFATE 0.83 MG/ML
2.5 SOLUTION RESPIRATORY (INHALATION)
Status: CANCELLED | OUTPATIENT
Start: 2021-04-20

## 2021-04-08 RX ORDER — PALONOSETRON 0.05 MG/ML
0.25 INJECTION, SOLUTION INTRAVENOUS ONCE
Status: CANCELLED
Start: 2021-04-27

## 2021-04-08 RX ORDER — PALONOSETRON 0.05 MG/ML
0.25 INJECTION, SOLUTION INTRAVENOUS ONCE
Status: CANCELLED
Start: 2021-04-20

## 2021-04-08 RX ORDER — DIPHENHYDRAMINE HYDROCHLORIDE 50 MG/ML
50 INJECTION INTRAMUSCULAR; INTRAVENOUS
Status: CANCELLED
Start: 2021-04-20

## 2021-04-08 RX ORDER — SODIUM CHLORIDE 9 MG/ML
1000 INJECTION, SOLUTION INTRAVENOUS CONTINUOUS PRN
Status: CANCELLED
Start: 2021-04-20

## 2021-04-08 RX ORDER — MEPERIDINE HYDROCHLORIDE 25 MG/ML
25 INJECTION INTRAMUSCULAR; INTRAVENOUS; SUBCUTANEOUS EVERY 30 MIN PRN
Status: CANCELLED | OUTPATIENT
Start: 2021-04-27

## 2021-04-08 RX ORDER — ALBUTEROL SULFATE 90 UG/1
1-2 AEROSOL, METERED RESPIRATORY (INHALATION)
Status: CANCELLED
Start: 2021-04-20

## 2021-04-08 RX ORDER — HEPARIN SODIUM (PORCINE) LOCK FLUSH IV SOLN 100 UNIT/ML 100 UNIT/ML
5 SOLUTION INTRAVENOUS
Status: CANCELLED | OUTPATIENT
Start: 2021-04-27

## 2021-04-08 RX ORDER — ALBUTEROL SULFATE 0.83 MG/ML
2.5 SOLUTION RESPIRATORY (INHALATION)
Status: CANCELLED | OUTPATIENT
Start: 2021-04-27

## 2021-04-08 RX ORDER — LORAZEPAM 2 MG/ML
0.5 INJECTION INTRAMUSCULAR EVERY 4 HOURS PRN
Status: CANCELLED
Start: 2021-04-27

## 2021-04-08 RX ORDER — FUROSEMIDE 10 MG/ML
40 INJECTION INTRAMUSCULAR; INTRAVENOUS ONCE
Status: COMPLETED | OUTPATIENT
Start: 2021-04-08 | End: 2021-04-08

## 2021-04-08 RX ORDER — NALOXONE HYDROCHLORIDE 0.4 MG/ML
.1-.4 INJECTION, SOLUTION INTRAMUSCULAR; INTRAVENOUS; SUBCUTANEOUS
Status: CANCELLED | OUTPATIENT
Start: 2021-04-27

## 2021-04-08 RX ORDER — EPINEPHRINE 1 MG/ML
0.3 INJECTION, SOLUTION INTRAMUSCULAR; SUBCUTANEOUS EVERY 5 MIN PRN
Status: CANCELLED | OUTPATIENT
Start: 2021-04-20

## 2021-04-08 RX ORDER — EPINEPHRINE 1 MG/ML
0.3 INJECTION, SOLUTION INTRAMUSCULAR; SUBCUTANEOUS EVERY 5 MIN PRN
Status: CANCELLED | OUTPATIENT
Start: 2021-04-27

## 2021-04-08 RX ADMIN — FUROSEMIDE 40 MG: 10 INJECTION INTRAMUSCULAR; INTRAVENOUS at 11:37

## 2021-04-08 ASSESSMENT — PAIN SCALES - GENERAL: PAINLEVEL: NO PAIN (0)

## 2021-04-08 NOTE — PROGRESS NOTES
Michaela is a 70 year old who is being evaluated via a billable telephone visit.      What phone number would you like to be contacted at? # [phone]  How would you like to obtain your AVS? Mail a copy         Subjective   Michaela is a 70 year old who presents for the following health issues      Objective    Vitals - Patient Reported  Pain Score: No Pain (0)      Phone call duration:     Keshia Ma CMA

## 2021-04-08 NOTE — DISCHARGE INSTRUCTIONS

## 2021-04-08 NOTE — LETTER
4/8/2021         RE: Michaela Dorman  [address]  [facility] [address]        Dear Colleague,    Thank you for referring your patient, Michaela Dorman, to the [facility]. Please see a copy of my visit note below.    Michaela is a 70 year old who is being evaluated via a billable telephone visit.      What phone number would you like to be contacted at? # [phone]  How would you like to obtain your AVS? Mail a copy         Subjective   Michaela is a 70 year old who presents for the following health issues      Objective    Vitals - Patient Reported  Pain Score: No Pain (0)      Phone call duration:     Keshia Ma CMA      [facility]  HEMATOLOGY AND ONCOLOGY    FOLLOW-UP VISIT NOTE    PATIENT NAME: Michaela Dorman MRN # [id number]  DATE OF VISIT: Apr 8, 2021 YOB: 1950    REFERRING PROVIDER: Girish Jack MD  [address] [place] [id number]     CANCER TYPE: Urothelial cancer from bladder  STAGE: cT2 disease                                                        TREATMENT SUMMARY:  2/22/21 - cystoscopy with biopsy of bladder dome revealed high grade urothelial cancer without lamina propria or muscularis                Urine cytology same day was suspicious for high-grade urothelial carcinoma   3/18/21 - Bladder, left trigone, tumor, trans urethral resection of bladder tumor (TURBT) revealed invasive high-grade urothelial carcinoma   with extensive invasion of muscularis propria  3/24/21 -  CT chest, abdomen and pelvis - mild irregularity of the posterior urinary bladder wall     CURRENT INTERVENTIONS:  Ongoing work up    SUBJECTIVE   Michaela Dorman is 70 year old female who is being followed for muscle invasive bladder cancer     Michaela was followed over telephone for this visit. She is being followed over telephone.       PAST MEDICAL HISTORY     Past Medical History:   Diagnosis Date     Carotid stenosis, bilateral L80%, R40%  09/02/2020     Central stenosis of spinal canal 12/01/2017    lumbar      DDD (degenerative disc disease), lumbar 12/01/2017     Depressive disorder, not elsewhere classified      Esophageal reflux      ETOH abuse     in remission     Foraminal stenosis of lumbar region 12/01/2017    Complete lumbar spine left at various degrees and to a lesser extent the right as well.     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     Subdural hematoma (H)          CURRENT OUTPATIENT MEDICATIONS     Current Outpatient Medications   Medication Sig     acetaminophen (TYLENOL) 500 MG tablet Take 1,000 mg by mouth 3 times daily as needed for mild pain (500MG X 2 = 1,000MG)     ALPRAZolam (XANAX) 0.5 MG tablet TAKE 1 TABLET BY MOUTH DAILY AT BEDTIME AS NEEDED FOR SLEEP     aspirin (ASA) 81 MG chewable tablet Take 1 tablet (81 mg) by mouth daily     atorvastatin (LIPITOR) 20 MG tablet TAKE ONE TABLET BY MOUTH ONCE DAILY     celecoxib (CELEBREX) 200 MG capsule TAKE 1 CAPSULE BY MOUTH TWICE A DAY     cephALEXin (KEFLEX) 250 MG capsule Take 1 capsule (250 mg) by mouth At Bedtime     diphenhydrAMINE (BENADRYL) 25 MG tablet Take 25 mg by mouth nightly as needed for itching or allergies     escitalopram (LEXAPRO) 10 MG tablet TAKE 1 TABLET BY MOUTH EVERY DAY WITH 20MG TABLETS     escitalopram (LEXAPRO) 20 MG tablet TAKE 1 TABLET BY MOUTH EVERY DAY WITH 20MG TABLETS     Magnesium Oxide 500 MG TABS Take 500 mg by mouth every morning     omeprazole (PRILOSEC) 20 MG DR capsule Take 20 mg by mouth daily     polyethylene glycol (MIRALAX/GLYCOLAX) powder Take 1 capful by mouth every morning      Psyllium (METAMUCIL FIBER PO) Take 2 teaspoonful by mouth every morning (mix in with liquid and drink)     senna-docusate (SENOKOT-S/PERICOLACE) 8.6-50 MG  tablet Take 2 tablets by mouth 2 times daily (Patient taking differently: Take 2 tablets by mouth 2 times daily as needed for constipation )     vitamin C (ASCORBIC ACID) 1000 MG TABS Take 1,000 mg by mouth every morning     Vitamin D3 (CHOLECALCIFEROL) 25 mcg (1000 units) tablet Take by mouth daily     Zinc 30 MG CAPS Take 30 mg by mouth every morning      B-12 METHYLCOBALAMIN PO Take 5,000 mcg by mouth every morning     COMPOUNDED NON-CONTROLLED SUBSTANCE (CMPD RX) - PHARMACY TO MIX COMPOUNDED MEDICATION Estriol 0.1% cream (1mg/gram) in HRT base. Place 1 gram vaginally daily for 2 weeks, then vaginally twice weekly. (Patient not taking: Reported on 4/8/2021)     Garlic 1000 MG CAPS Take 1,000 mg by mouth every morning     phenazopyridine (AZO URINARY PAIN RELIEF) 95 MG tablet Take 190 mg by mouth 3 times daily     prochlorperazine (COMPAZINE) 10 MG tablet TAKE 1 TABLET BY MOUTH EVERY 6 HOURS AS NEEDED FOR NAUSEA AND VOMITING (Patient not taking: Reported on 4/8/2021)     No current facility-administered medications for this visit.         ALLERGIES      Allergies   Allergen Reactions     No Known Drug Allergies      Oxybutynin      Patient reports symptoms of nausea and mind fogginess        REVIEW OF SYSTEMS   As above in the HPI, o/w complete 12-point ROS was negative.     PHYSICAL EXAM   Wt 59.9 kg (132 lb)   BMI 20.99 kg/m         LABORATORY AND IMAGING STUDIES     Recent Labs   Lab Test 04/08/21  1010 02/03/21  1511 01/21/21  1001 01/12/21  1400 12/24/20  1104 12/11/20  1034   NA  --  135 136 138 136 139   POTASSIUM  --  4.2 4.3 4.1 4.9 4.6   CHLORIDE  --  105 104 106 103 108   CO2  --  26 28 27 28 26   ANIONGAP  --  4 4 5 5 5   BUN  --  26 18 22 19 26   CR  --  1.12* 0.96 1.20* 1.20* 1.19*   GLC 92 83 86 79 86 95   VANDANA  --  9.3 9.3 10.1 9.9 9.7     Recent Labs   Lab Test 02/03/21  1511 01/21/21  1001 01/12/21  1400   PHOS 3.3 2.7 3.5     Recent Labs   Lab Test 12/24/20  1104 12/11/20  1034 11/20/20  1159  10/01/20  1139 08/31/20  1241 07/16/20  1051   WBC 7.4 8.4 8.6 9.2 8.1 7.1   HGB 11.1* 11.0* 11.0* 11.3* 13.1 11.5*    236 254 269 269 284   * 106* 103* 101* 101* 99   NEUTROPHIL  --  74.5 69.4 69.3 68.9 70.7     Recent Labs   Lab Test 02/03/21  1511 01/21/21  1001 01/12/21  1400 12/24/20  1104 12/11/20  1034 10/01/20  1139   BILITOTAL  --   --   --  0.3 0.5 0.3   ALKPHOS  --   --   --  42 47 61   ALT  --   --   --  24 28 49   AST  --   --   --  21 30 50*   ALBUMIN 4.1 4.0 4.2 4.2 4.2 4.3     TSH   Date Value Ref Range Status   01/24/2017 1.34 0.40 - 4.00 mU/L Final   08/31/2016 0.39 (L) 0.40 - 4.00 mU/L Final   06/10/2013 0.49 0.4 - 5.0 mU/L Final     No results for input(s): CEA in the last 22107 hours.  Results for orders placed or performed in visit on 04/08/21   PET Oncology Whole Body    Narrative    Combined Report of:    PET and CT on  4/8/2021 12:11 PM :    1. PET of the neck, chest, abdomen, and pelvis.  2. PET CT Fusion for Attenuation Correction and Anatomical  Localization:    3. Diagnostic CT scan of the chest, abdomen, and pelvis with  intravenous contrast for interpretation.  3. CT of the chest, abdomen and pelvis obtained for diagnostic  interpretation.  4. 3D MIP and PET-CT fused images were processed on an independent  workstation and archived to PACS and reviewed by a radiologist.    Technique:    1. PET: The patient received 15.9 mCi of F-18-FDG; the serum glucose  was 92 prior to administration, body weight was 60 kg. Images were  evaluated in the axial, sagittal, and coronal planes as well as the  rotational whole body MIP. Images were acquired from the Vertex to the  Feet.    UPTAKE WAS MEASURED AT 60 MINUTES.     BACKGROUND:  Liver SUV max= 2.7,   Aorta Blood SUV Max: 2.4v   The  2. CT: Volumetric acquisition for clinical interpretation of the  chest, abdomen, and pelvis acquired at 3 mm sections . The chest,  abdomen, and pelvis were evaluated at 5 mm sections in bone,  soft  tissue, and lung windows.      The patient received 100 cc of Optiray 320 intravenously for the  examination.    --    3. 3D MIP and PET-CT fused images were processed on an independent  workstation and archived to PACS and reviewed by a radiologist.    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 Has had complete  pathologic response. In February 2021, patient was evaluated for  cystoscopy with biopsy of bladder dome revealed high grade urothelial  cancer without lamina propria or muscularis. She subsequently  underwent transurethral resection of bladder tumor on 3/18/2021 which  revealed invasive high-grade urothelial carcinoma with extensive  invasion of muscularis propria. Since then, has met with oncology and  urology. Planning for neoadjuvant chemotherapy and cystectomy 4-6  weeks after completion of chemotherapy. Staging PET/CT.    COMPARISON: PET CT 3/10/2017, CT abdomen and pelvis 2/22/2021.    FINDINGS:     HEAD/NECK:  There is no  suspicious FDG uptake in the neck.     The paranasal sinuses are clear. The mastoid air cells are clear.     The mucosal pharyngeal space, the , prevertebral and carotid  spaces are within normal limits.     No masses, mass effect or pathologically enlarged lymph nodes are  evident. The thyroid gland is within normal limits.    CHEST:  There is no suspicious FDG uptake in the chest.     There are no pathologically enlarged mediastinal, hilar or axillary  lymph nodes.     The central tracheobronchial  tree is clear. No pleural effusion or  pneumothorax. Postradiation changes/scarring of the anterior left  pleura. Scattered subcentimeter solid pulmonary nodules, for example 2  mm pulmonary nodule in the right middle lobe (series 11 image 106) and  3 mm, pulmonary nodule in the right lower lobe (series 11 image 105),  and 4 mm subpleural pulmonary nodule along the right minor fissure  (series 11 image 83). No focal consolidation.     Conventional branching pattern of the great vessels. The ascending  aorta and main pulmonary artery are normal in caliber. No central  pulmonary embolism. The heart is not enlarged. Moderate to severe  coronary artery calcium. No pericardial effusion. No enlarged  mediastinal lymph nodes. Postsurgical changes of bilateral mastectomy.    ABDOMEN AND PELVIS:  There is no suspicious FDG uptake in the abdomen or pelvis.  Postprocedural changes of TURBT. There is a 2.2 x 0.9 cm enhancing  lesion along the posterior left trigone, corresponding to the  biopsy-proven high-grade urothelial carcinoma. This region is  difficult to evaluate for FDG avidity given radiotracer excretion in  the bladder. Anteriorly in the bladder just left of midline (series 7  image 120), there is a small focus of enhancement with mild  hypermetabolism max SUV 4.9.    There are no suspicious hepatic lesions. There is no splenomegaly or  evidence for splenic or pancreatic mass lesion.  There are no suspicious adrenal mass lesions or opaque gallbladder  calculi. There is symmetric nephrographic renal phase without  hydronephrosis. No dilated loops of small and large bowel. No  inflammatory changes of the bowel. No free fluid or air in the abdomen  or pelvis. No enlarged abdominal or pelvic lymph nodes.    LOWER EXTREMITIES:   No abnormal masses or hypermetabolic lesions.    BONES:   There are no suspicious lytic or blastic osseous lesions.  There is no  abnormal FDG uptake in the skeleton. Postsurgical changes of  L2-L5  laminectomies, posterior fusion instrumentation and interbody disc  spacer placement. Mild degenerative changes throughout the thoracic  spine. Schmorl's node changes of the inferior T12 endplate.      Impression    IMPRESSION:   1. No evidence of metastatic disease in the chest, abdomen or pelvis.  2. Postprocedural changes of TRUBT with a 2.2 x 0.9 cm enhancing mass  along the left trigone, representing residual malignancy. FDG avidity  in this lesion cannot be determined due to overlying radiotracer  excretion (even post Lasix).    2a. A second focal area of mild hypermetabolism and trace enhancement  along the anterior bladder just left of midline, indeterminant may  represent a second focus of disease or   postprocedural inflammation.     3. Sub-5 mm solid pulmonary nodules as described above, stable since  2017.     I have personally reviewed the examination and initial interpretation  and I agree with the findings.    SYLVIA DONG MD     *Note: Due to a large number of results and/or encounters for the requested time period, some results have not been displayed. A complete set of results can be found in Results Review.         ASSESSMENT AND PLAN   High grade muscle invasive bladder cancer involving the left trigone  Medical comorbidities including chronic kidney disease stage III A, carotid stenosis  Previously treated breast cancer  ECOG PS 0    Michaela is being followed after having PET-CT scan. She is not interested to participate in the clinical trial.     I have reviewed actual images from her PET-CT scan. There is uptake in the known site of disease along the left trigone. There was a small area of uptake in anterior wall of bladder which is indeterminate. There is no evidence of metastatic disease.     We do not have any recent lab values for her. She did not get any labs drawn despite having a PET scan done. I do not have any recent creatinine values for her. She should get labs drawn  as soon as possible. This would help us plan for her chemotherapy. If she has creatinine clearance between 45 and 60 we could consider split dose cisplatin. If it is any lower, we would have to forego chemotherapy and refer her directly for surgery.     I again reviewed the chemotherapy regimen including side effects and logistics. We discussed the individual side effects of cisplatin and gemcitabine. Most notably, we discussed tinnitus, hearing deficits, nephrotoxicity, peripheral neuropathy, nausea, and vomiting with cisplatin. We also reviewed the myelosuppression with these agents, and in particular thrombocytopenia, which is fairly common with gemcitabine. Both agents together could contribute to fatigue, diminished appetite, altered taste, neutropenia and possibly neutropenic fever. Patients have to take every temperature greater than 100.4F seriously and immediately call us to care. In case she is neutropenic or would potentially get neutropenic soon, we would admit her and treat with IV antibiotics for 48 hrs.    We would plan for 2 cycles of chemotherapy with cisplatin and gemcitabine followed by a PET-CT scan. I would like to see her prior to her 3rd cycle with the PET-CT scan. She will be followed by one of my nurse practitioners prior to cycle 1 and 2. We do not necessarily need a provider visit prior to cycle 1.     Telephone duration 32 min    Flaco Cuellar  Hematologist and Medical Oncologist  [facility]        Again, thank you for allowing me to participate in the care of your patient.        Sincerely,        Flaco Cuellar MD

## 2021-04-08 NOTE — LETTER
4/8/2021         RE: Michaela Dorman  [address]  [facility] [address]        Dear Colleague,    Thank you for referring your patient, Michaela Dorman, to the [facility]. Please see a copy of my visit note below.    Michaela is a 70 year old who is being evaluated via a billable telephone visit.      What phone number would you like to be contacted at? # [phone]  How would you like to obtain your AVS? Mail a copy         Subjective   Michaela is a 70 year old who presents for the following health issues      Objective    Vitals - Patient Reported  Pain Score: No Pain (0)      Phone call duration:     Keshia Ma CMA      [facility]  HEMATOLOGY AND ONCOLOGY    FOLLOW-UP VISIT NOTE    PATIENT NAME: Michaela Dorman MRN # [id number]  DATE OF VISIT: Apr 8, 2021 YOB: 1950    REFERRING PROVIDER: Girish Jack MD  [address] [place] [id number]     CANCER TYPE: Urothelial cancer from bladder  STAGE: cT2 disease                                                        TREATMENT SUMMARY:  2/22/21 - cystoscopy with biopsy of bladder dome revealed high grade urothelial cancer without lamina propria or muscularis                Urine cytology same day was suspicious for high-grade urothelial carcinoma   3/18/21 - Bladder, left trigone, tumor, trans urethral resection of bladder tumor (TURBT) revealed invasive high-grade urothelial carcinoma   with extensive invasion of muscularis propria  3/24/21 -  CT chest, abdomen and pelvis - mild irregularity of the posterior urinary bladder wall     CURRENT INTERVENTIONS:  Ongoing work up    SUBJECTIVE   Michaela Dorman is 70 year old female who is being followed for muscle invasive bladder cancer     Michaela was followed over telephone for this visit. She is being followed over telephone.       PAST MEDICAL HISTORY     Past Medical History:   Diagnosis Date     Carotid stenosis, bilateral L80%, R40%  09/02/2020     Central stenosis of spinal canal 12/01/2017    lumbar      DDD (degenerative disc disease), lumbar 12/01/2017     Depressive disorder, not elsewhere classified      Esophageal reflux      ETOH abuse     in remission     Foraminal stenosis of lumbar region 12/01/2017    Complete lumbar spine left at various degrees and to a lesser extent the right as well.     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     Subdural hematoma (H)          CURRENT OUTPATIENT MEDICATIONS     Current Outpatient Medications   Medication Sig     acetaminophen (TYLENOL) 500 MG tablet Take 1,000 mg by mouth 3 times daily as needed for mild pain (500MG X 2 = 1,000MG)     ALPRAZolam (XANAX) 0.5 MG tablet TAKE 1 TABLET BY MOUTH DAILY AT BEDTIME AS NEEDED FOR SLEEP     aspirin (ASA) 81 MG chewable tablet Take 1 tablet (81 mg) by mouth daily     atorvastatin (LIPITOR) 20 MG tablet TAKE ONE TABLET BY MOUTH ONCE DAILY     celecoxib (CELEBREX) 200 MG capsule TAKE 1 CAPSULE BY MOUTH TWICE A DAY     cephALEXin (KEFLEX) 250 MG capsule Take 1 capsule (250 mg) by mouth At Bedtime     diphenhydrAMINE (BENADRYL) 25 MG tablet Take 25 mg by mouth nightly as needed for itching or allergies     escitalopram (LEXAPRO) 10 MG tablet TAKE 1 TABLET BY MOUTH EVERY DAY WITH 20MG TABLETS     escitalopram (LEXAPRO) 20 MG tablet TAKE 1 TABLET BY MOUTH EVERY DAY WITH 20MG TABLETS     Magnesium Oxide 500 MG TABS Take 500 mg by mouth every morning     omeprazole (PRILOSEC) 20 MG DR capsule Take 20 mg by mouth daily     polyethylene glycol (MIRALAX/GLYCOLAX) powder Take 1 capful by mouth every morning      Psyllium (METAMUCIL FIBER PO) Take 2 teaspoonful by mouth every morning (mix in with liquid and drink)     senna-docusate (SENOKOT-S/PERICOLACE) 8.6-50 MG  tablet Take 2 tablets by mouth 2 times daily (Patient taking differently: Take 2 tablets by mouth 2 times daily as needed for constipation )     vitamin C (ASCORBIC ACID) 1000 MG TABS Take 1,000 mg by mouth every morning     Vitamin D3 (CHOLECALCIFEROL) 25 mcg (1000 units) tablet Take by mouth daily     Zinc 30 MG CAPS Take 30 mg by mouth every morning      B-12 METHYLCOBALAMIN PO Take 5,000 mcg by mouth every morning     COMPOUNDED NON-CONTROLLED SUBSTANCE (CMPD RX) - PHARMACY TO MIX COMPOUNDED MEDICATION Estriol 0.1% cream (1mg/gram) in HRT base. Place 1 gram vaginally daily for 2 weeks, then vaginally twice weekly. (Patient not taking: Reported on 4/8/2021)     Garlic 1000 MG CAPS Take 1,000 mg by mouth every morning     phenazopyridine (AZO URINARY PAIN RELIEF) 95 MG tablet Take 190 mg by mouth 3 times daily     prochlorperazine (COMPAZINE) 10 MG tablet TAKE 1 TABLET BY MOUTH EVERY 6 HOURS AS NEEDED FOR NAUSEA AND VOMITING (Patient not taking: Reported on 4/8/2021)     No current facility-administered medications for this visit.         ALLERGIES      Allergies   Allergen Reactions     No Known Drug Allergies      Oxybutynin      Patient reports symptoms of nausea and mind fogginess        REVIEW OF SYSTEMS   As above in the HPI, o/w complete 12-point ROS was negative.     PHYSICAL EXAM   Wt 59.9 kg (132 lb)   BMI 20.99 kg/m         LABORATORY AND IMAGING STUDIES     Recent Labs   Lab Test 04/08/21  1010 02/03/21  1511 01/21/21  1001 01/12/21  1400 12/24/20  1104 12/11/20  1034   NA  --  135 136 138 136 139   POTASSIUM  --  4.2 4.3 4.1 4.9 4.6   CHLORIDE  --  105 104 106 103 108   CO2  --  26 28 27 28 26   ANIONGAP  --  4 4 5 5 5   BUN  --  26 18 22 19 26   CR  --  1.12* 0.96 1.20* 1.20* 1.19*   GLC 92 83 86 79 86 95   VANDANA  --  9.3 9.3 10.1 9.9 9.7     Recent Labs   Lab Test 02/03/21  1511 01/21/21  1001 01/12/21  1400   PHOS 3.3 2.7 3.5     Recent Labs   Lab Test 12/24/20  1104 12/11/20  1034 11/20/20  1153  10/01/20  1139 08/31/20  1241 07/16/20  1051   WBC 7.4 8.4 8.6 9.2 8.1 7.1   HGB 11.1* 11.0* 11.0* 11.3* 13.1 11.5*    236 254 269 269 284   * 106* 103* 101* 101* 99   NEUTROPHIL  --  74.5 69.4 69.3 68.9 70.7     Recent Labs   Lab Test 02/03/21  1511 01/21/21  1001 01/12/21  1400 12/24/20  1104 12/11/20  1034 10/01/20  1139   BILITOTAL  --   --   --  0.3 0.5 0.3   ALKPHOS  --   --   --  42 47 61   ALT  --   --   --  24 28 49   AST  --   --   --  21 30 50*   ALBUMIN 4.1 4.0 4.2 4.2 4.2 4.3     TSH   Date Value Ref Range Status   01/24/2017 1.34 0.40 - 4.00 mU/L Final   08/31/2016 0.39 (L) 0.40 - 4.00 mU/L Final   06/10/2013 0.49 0.4 - 5.0 mU/L Final     No results for input(s): CEA in the last [id number] hours.  Results for orders placed or performed in visit on 04/08/21   PET Oncology Whole Body    Narrative    Combined Report of:    PET and CT on  4/8/2021 12:11 PM :    1. PET of the neck, chest, abdomen, and pelvis.  2. PET CT Fusion for Attenuation Correction and Anatomical  Localization:    3. Diagnostic CT scan of the chest, abdomen, and pelvis with  intravenous contrast for interpretation.  3. CT of the chest, abdomen and pelvis obtained for diagnostic  interpretation.  4. 3D MIP and PET-CT fused images were processed on an independent  workstation and archived to PACS and reviewed by a radiologist.    Technique:    1. PET: The patient received 15.9 mCi of F-18-FDG; the serum glucose  was 92 prior to administration, body weight was 60 kg. Images were  evaluated in the axial, sagittal, and coronal planes as well as the  rotational whole body MIP. Images were acquired from the Vertex to the  Feet.    UPTAKE WAS MEASURED AT 60 MINUTES.     BACKGROUND:  Liver SUV max= 2.7,   Aorta Blood SUV Max: 2.4v   The  2. CT: Volumetric acquisition for clinical interpretation of the  chest, abdomen, and pelvis acquired at 3 mm sections . The chest,  abdomen, and pelvis were evaluated at 5 mm sections in bone,  soft  tissue, and lung windows.      The patient received 100 cc of Optiray 320 intravenously for the  examination.    --    3. 3D MIP and PET-CT fused images were processed on an independent  workstation and archived to PACS and reviewed by a radiologist.    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 Has had complete  pathologic response. In February 2021, patient was evaluated for  cystoscopy with biopsy of bladder dome revealed high grade urothelial  cancer without lamina propria or muscularis. She subsequently  underwent transurethral resection of bladder tumor on 3/18/2021 which  revealed invasive high-grade urothelial carcinoma with extensive  invasion of muscularis propria. Since then, has met with oncology and  urology. Planning for neoadjuvant chemotherapy and cystectomy 4-6  weeks after completion of chemotherapy. Staging PET/CT.    COMPARISON: PET CT 3/10/2017, CT abdomen and pelvis 2/22/2021.    FINDINGS:     HEAD/NECK:  There is no  suspicious FDG uptake in the neck.     The paranasal sinuses are clear. The mastoid air cells are clear.     The mucosal pharyngeal space, the , prevertebral and carotid  spaces are within normal limits.     No masses, mass effect or pathologically enlarged lymph nodes are  evident. The thyroid gland is within normal limits.    CHEST:  There is no suspicious FDG uptake in the chest.     There are no pathologically enlarged mediastinal, hilar or axillary  lymph nodes.     The central tracheobronchial  tree is clear. No pleural effusion or  pneumothorax. Postradiation changes/scarring of the anterior left  pleura. Scattered subcentimeter solid pulmonary nodules, for example 2  mm pulmonary nodule in the right middle lobe (series 11 image 106) and  3 mm, pulmonary nodule in the right lower lobe (series 11 image 105),  and 4 mm subpleural pulmonary nodule along the right minor fissure  (series 11 image 83). No focal consolidation.     Conventional branching pattern of the great vessels. The ascending  aorta and main pulmonary artery are normal in caliber. No central  pulmonary embolism. The heart is not enlarged. Moderate to severe  coronary artery calcium. No pericardial effusion. No enlarged  mediastinal lymph nodes. Postsurgical changes of bilateral mastectomy.    ABDOMEN AND PELVIS:  There is no suspicious FDG uptake in the abdomen or pelvis.  Postprocedural changes of TURBT. There is a 2.2 x 0.9 cm enhancing  lesion along the posterior left trigone, corresponding to the  biopsy-proven high-grade urothelial carcinoma. This region is  difficult to evaluate for FDG avidity given radiotracer excretion in  the bladder. Anteriorly in the bladder just left of midline (series 7  image 120), there is a small focus of enhancement with mild  hypermetabolism max SUV 4.9.    There are no suspicious hepatic lesions. There is no splenomegaly or  evidence for splenic or pancreatic mass lesion.  There are no suspicious adrenal mass lesions or opaque gallbladder  calculi. There is symmetric nephrographic renal phase without  hydronephrosis. No dilated loops of small and large bowel. No  inflammatory changes of the bowel. No free fluid or air in the abdomen  or pelvis. No enlarged abdominal or pelvic lymph nodes.    LOWER EXTREMITIES:   No abnormal masses or hypermetabolic lesions.    BONES:   There are no suspicious lytic or blastic osseous lesions.  There is no  abnormal FDG uptake in the skeleton. Postsurgical changes of  L2-L5  laminectomies, posterior fusion instrumentation and interbody disc  spacer placement. Mild degenerative changes throughout the thoracic  spine. Schmorl's node changes of the inferior T12 endplate.      Impression    IMPRESSION:   1. No evidence of metastatic disease in the chest, abdomen or pelvis.  2. Postprocedural changes of TRUBT with a 2.2 x 0.9 cm enhancing mass  along the left trigone, representing residual malignancy. FDG avidity  in this lesion cannot be determined due to overlying radiotracer  excretion (even post Lasix).    2a. A second focal area of mild hypermetabolism and trace enhancement  along the anterior bladder just left of midline, indeterminant may  represent a second focus of disease or   postprocedural inflammation.     3. Sub-5 mm solid pulmonary nodules as described above, stable since  2017.     I have personally reviewed the examination and initial interpretation  and I agree with the findings.    SYLVIA DONG MD     *Note: Due to a large number of results and/or encounters for the requested time period, some results have not been displayed. A complete set of results can be found in Results Review.         ASSESSMENT AND PLAN   High grade muscle invasive bladder cancer involving the left trigone  Medical comorbidities including chronic kidney disease stage III A, carotid stenosis  Previously treated breast cancer  ECOG PS 0    Michaela is being followed after having PET-CT scan. She is not interested to participate in the clinical trial.     I have reviewed actual images from her PET-CT scan. There is uptake in the known site of disease along the left trigone. There was a small area of uptake in anterior wall of bladder which is indeterminate. There is no evidence of metastatic disease.     We do not have any recent lab values for her. She did not get any labs drawn despite having a PET scan done. I do not have any recent creatinine values for her. She should get labs drawn  as soon as possible. This would help us plan for her chemotherapy. If she has creatinine clearance between 45 and 60 we could consider split dose cisplatin. If it is any lower, we would have to forego chemotherapy and refer her directly for surgery.     I again reviewed the chemotherapy regimen including side effects and logistics. We discussed the individual side effects of cisplatin and gemcitabine. Most notably, we discussed tinnitus, hearing deficits, nephrotoxicity, peripheral neuropathy, nausea, and vomiting with cisplatin. We also reviewed the myelosuppression with these agents, and in particular thrombocytopenia, which is fairly common with gemcitabine. Both agents together could contribute to fatigue, diminished appetite, altered taste, neutropenia and possibly neutropenic fever. Patients have to take every temperature greater than 100.4F seriously and immediately call us to care. In case she is neutropenic or would potentially get neutropenic soon, we would admit her and treat with IV antibiotics for 48 hrs.    We would plan for 2 cycles of chemotherapy with cisplatin and gemcitabine followed by a PET-CT scan. I would like to see her prior to her 3rd cycle with the PET-CT scan. She will be followed by one of my nurse practitioners prior to cycle 1 and 2. We do not necessarily need a provider visit prior to cycle 1.     Telephone duration 32 min    Flaco Cuellar  Hematologist and Medical Oncologist  [facility]        Again, thank you for allowing me to participate in the care of your patient.        Sincerely,        Flaco Cuellar MD

## 2021-04-09 ENCOUNTER — TELEPHONE (OUTPATIENT)
Dept: INTERVENTIONAL RADIOLOGY/VASCULAR | Facility: CLINIC | Age: 71
End: 2021-04-09

## 2021-04-09 DIAGNOSIS — R60.9 EDEMA: Primary | ICD-10-CM

## 2021-04-09 DIAGNOSIS — C67.9 UROTHELIAL CARCINOMA OF BLADDER WITH INVASION OF MUSCLE (H): ICD-10-CM

## 2021-04-09 NOTE — TELEPHONE ENCOUNTER
Pre-call completed on 4/9/2021. NPO at 0730. Will arrive at 1130 for a 1300 case. COVID test scheduled for 4/13. Blood Thinners, not to hold baby aspirin per anticoagulant protocol. No further questions or concerns.     Shaniqua Rodriguez RN

## 2021-04-11 NOTE — PROGRESS NOTES
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  STAGE: cT2 disease                                                        TREATMENT SUMMARY:  2/22/21 - cystoscopy with biopsy of bladder dome revealed high grade urothelial cancer without lamina propria or muscularis                Urine cytology same day was suspicious for high-grade urothelial carcinoma   3/18/21 - Bladder, left trigone, tumor, trans urethral resection of bladder tumor (TURBT) revealed invasive high-grade urothelial carcinoma   with extensive invasion of muscularis propria  3/24/21 -  CT chest, abdomen and pelvis - mild irregularity of the posterior urinary bladder wall     CURRENT INTERVENTIONS:  Ongoing work up    SUBJECTIVE   Michaela Dorman is 70 year old female who is being followed for muscle invasive bladder cancer     Michaela was followed over telephone for this visit. She is being followed over telephone.       PAST MEDICAL HISTORY     Past Medical History:   Diagnosis Date     Carotid stenosis, bilateral L80%, R40% 09/02/2020     Central stenosis of spinal canal 12/01/2017    lumbar      DDD (degenerative disc disease), lumbar 12/01/2017     Depressive disorder, not elsewhere classified      Esophageal reflux      ETOH abuse     in remission     Foraminal stenosis of lumbar region 12/01/2017    Complete lumbar spine left at various degrees and to a lesser extent the right as well.     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     Subdural hematoma (H)          CURRENT  OUTPATIENT MEDICATIONS     Current Outpatient Medications   Medication Sig     acetaminophen (TYLENOL) 500 MG tablet Take 1,000 mg by mouth 3 times daily as needed for mild pain (500MG X 2 = 1,000MG)     ALPRAZolam (XANAX) 0.5 MG tablet TAKE 1 TABLET BY MOUTH DAILY AT BEDTIME AS NEEDED FOR SLEEP     aspirin (ASA) 81 MG chewable tablet Take 1 tablet (81 mg) by mouth daily     atorvastatin (LIPITOR) 20 MG tablet TAKE ONE TABLET BY MOUTH ONCE DAILY     celecoxib (CELEBREX) 200 MG capsule TAKE 1 CAPSULE BY MOUTH TWICE A DAY     cephALEXin (KEFLEX) 250 MG capsule Take 1 capsule (250 mg) by mouth At Bedtime     diphenhydrAMINE (BENADRYL) 25 MG tablet Take 25 mg by mouth nightly as needed for itching or allergies     escitalopram (LEXAPRO) 10 MG tablet TAKE 1 TABLET BY MOUTH EVERY DAY WITH 20MG TABLETS     escitalopram (LEXAPRO) 20 MG tablet TAKE 1 TABLET BY MOUTH EVERY DAY WITH 20MG TABLETS     Magnesium Oxide 500 MG TABS Take 500 mg by mouth every morning     omeprazole (PRILOSEC) 20 MG DR capsule Take 20 mg by mouth daily     polyethylene glycol (MIRALAX/GLYCOLAX) powder Take 1 capful by mouth every morning      Psyllium (METAMUCIL FIBER PO) Take 2 teaspoonful by mouth every morning (mix in with liquid and drink)     senna-docusate (SENOKOT-S/PERICOLACE) 8.6-50 MG tablet Take 2 tablets by mouth 2 times daily (Patient taking differently: Take 2 tablets by mouth 2 times daily as needed for constipation )     vitamin C (ASCORBIC ACID) 1000 MG TABS Take 1,000 mg by mouth every morning     Vitamin D3 (CHOLECALCIFEROL) 25 mcg (1000 units) tablet Take by mouth daily     Zinc 30 MG CAPS Take 30 mg by mouth every morning      B-12 METHYLCOBALAMIN PO Take 5,000 mcg by mouth every morning     COMPOUNDED NON-CONTROLLED SUBSTANCE (CMPD RX) - PHARMACY TO MIX COMPOUNDED MEDICATION Estriol 0.1% cream (1mg/gram) in HRT base. Place 1 gram vaginally daily for 2 weeks, then vaginally twice weekly. (Patient not taking: Reported on 4/8/2021)      Garlic 1000 MG CAPS Take 1,000 mg by mouth every morning     phenazopyridine (AZO URINARY PAIN RELIEF) 95 MG tablet Take 190 mg by mouth 3 times daily     prochlorperazine (COMPAZINE) 10 MG tablet TAKE 1 TABLET BY MOUTH EVERY 6 HOURS AS NEEDED FOR NAUSEA AND VOMITING (Patient not taking: Reported on 4/8/2021)     No current facility-administered medications for this visit.         ALLERGIES      Allergies   Allergen Reactions     No Known Drug Allergies      Oxybutynin      Patient reports symptoms of nausea and mind fogginess        REVIEW OF SYSTEMS   As above in the HPI, o/w complete 12-point ROS was negative.     PHYSICAL EXAM   Wt 59.9 kg (132 lb)   BMI 20.99 kg/m         LABORATORY AND IMAGING STUDIES     Recent Labs   Lab Test 04/08/21  1010 02/03/21  1511 01/21/21  1001 01/12/21  1400 12/24/20  1104 12/11/20  1034   NA  --  135 136 138 136 139   POTASSIUM  --  4.2 4.3 4.1 4.9 4.6   CHLORIDE  --  105 104 106 103 108   CO2  --  26 28 27 28 26   ANIONGAP  --  4 4 5 5 5   BUN  --  26 18 22 19 26   CR  --  1.12* 0.96 1.20* 1.20* 1.19*   GLC 92 83 86 79 86 95   VANDANA  --  9.3 9.3 10.1 9.9 9.7     Recent Labs   Lab Test 02/03/21  1511 01/21/21  1001 01/12/21  1400   PHOS 3.3 2.7 3.5     Recent Labs   Lab Test 12/24/20  1104 12/11/20  1034 11/20/20  1151 10/01/20  1139 08/31/20  1241 07/16/20  1051   WBC 7.4 8.4 8.6 9.2 8.1 7.1   HGB 11.1* 11.0* 11.0* 11.3* 13.1 11.5*    236 254 269 269 284   * 106* 103* 101* 101* 99   NEUTROPHIL  --  74.5 69.4 69.3 68.9 70.7     Recent Labs   Lab Test 02/03/21  1511 01/21/21  1001 01/12/21  1400 12/24/20  1104 12/11/20  1034 10/01/20  1139   BILITOTAL  --   --   --  0.3 0.5 0.3   ALKPHOS  --   --   --  42 47 61   ALT  --   --   --  24 28 49   AST  --   --   --  21 30 50*   ALBUMIN 4.1 4.0 4.2 4.2 4.2 4.3     TSH   Date Value Ref Range Status   01/24/2017 1.34 0.40 - 4.00 mU/L Final   08/31/2016 0.39 (L) 0.40 - 4.00 mU/L Final   06/10/2013 0.49 0.4 - 5.0 mU/L Final      No results for input(s): CEA in the last [id number] hours.  Results for orders placed or performed in visit on 04/08/21   PET Oncology Whole Body    Narrative    Combined Report of:    PET and CT on  4/8/2021 12:11 PM :    1. PET of the neck, chest, abdomen, and pelvis.  2. PET CT Fusion for Attenuation Correction and Anatomical  Localization:    3. Diagnostic CT scan of the chest, abdomen, and pelvis with  intravenous contrast for interpretation.  3. CT of the chest, abdomen and pelvis obtained for diagnostic  interpretation.  4. 3D MIP and PET-CT fused images were processed on an independent  workstation and archived to PACS and reviewed by a radiologist.    Technique:    1. PET: The patient received 15.9 mCi of F-18-FDG; the serum glucose  was 92 prior to administration, body weight was 60 kg. Images were  evaluated in the axial, sagittal, and coronal planes as well as the  rotational whole body MIP. Images were acquired from the Vertex to the  Feet.    UPTAKE WAS MEASURED AT 60 MINUTES.     BACKGROUND:  Liver SUV max= 2.7,   Aorta Blood SUV Max: 2.4v   The  2. CT: Volumetric acquisition for clinical interpretation of the  chest, abdomen, and pelvis acquired at 3 mm sections . The chest,  abdomen, and pelvis were evaluated at 5 mm sections in bone, soft  tissue, and lung windows.      The patient received 100 cc of Optiray 320 intravenously for the  examination.    --    3. 3D MIP and PET-CT fused images were processed on an independent  workstation and archived to PACS and reviewed by a radiologist.    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 Has had complete  pathologic response. In February 2021, patient was evaluated for  cystoscopy with biopsy of bladder dome revealed high grade urothelial  cancer without lamina propria or muscularis. She subsequently  underwent transurethral resection of bladder tumor on 3/18/2021 which  revealed invasive high-grade urothelial carcinoma with extensive  invasion of muscularis propria. Since then, has met with oncology and  urology. Planning for neoadjuvant chemotherapy and cystectomy 4-6  weeks after completion of chemotherapy. Staging PET/CT.    COMPARISON: PET CT 3/10/2017, CT abdomen and pelvis 2/22/2021.    FINDINGS:     HEAD/NECK:  There is no  suspicious FDG uptake in the neck.     The paranasal sinuses are clear. The mastoid air cells are clear.     The mucosal pharyngeal space, the , prevertebral and carotid  spaces are within normal limits.     No masses, mass effect or pathologically enlarged lymph nodes are  evident. The thyroid gland is within normal limits.    CHEST:  There is no suspicious FDG uptake in the chest.     There are no pathologically enlarged mediastinal, hilar or axillary  lymph nodes.     The central tracheobronchial tree is clear. No pleural effusion or  pneumothorax. Postradiation changes/scarring of the anterior left  pleura. Scattered subcentimeter solid pulmonary nodules, for example 2  mm pulmonary nodule in the right middle lobe (series 11 image 106) and  3 mm, pulmonary nodule in the right lower lobe (series 11 image 105),  and 4 mm subpleural pulmonary nodule along the right minor fissure  (series 11 image 83). No focal consolidation.     Conventional branching pattern of the great vessels. The ascending  aorta and main pulmonary artery are normal in caliber. No central  pulmonary embolism. The heart is not enlarged. Moderate to severe  coronary artery calcium. No  pericardial effusion. No enlarged  mediastinal lymph nodes. Postsurgical changes of bilateral mastectomy.    ABDOMEN AND PELVIS:  There is no suspicious FDG uptake in the abdomen or pelvis.  Postprocedural changes of TURBT. There is a 2.2 x 0.9 cm enhancing  lesion along the posterior left trigone, corresponding to the  biopsy-proven high-grade urothelial carcinoma. This region is  difficult to evaluate for FDG avidity given radiotracer excretion in  the bladder. Anteriorly in the bladder just left of midline (series 7  image 120), there is a small focus of enhancement with mild  hypermetabolism max SUV 4.9.    There are no suspicious hepatic lesions. There is no splenomegaly or  evidence for splenic or pancreatic mass lesion.  There are no suspicious adrenal mass lesions or opaque gallbladder  calculi. There is symmetric nephrographic renal phase without  hydronephrosis. No dilated loops of small and large bowel. No  inflammatory changes of the bowel. No free fluid or air in the abdomen  or pelvis. No enlarged abdominal or pelvic lymph nodes.    LOWER EXTREMITIES:   No abnormal masses or hypermetabolic lesions.    BONES:   There are no suspicious lytic or blastic osseous lesions.  There is no  abnormal FDG uptake in the skeleton. Postsurgical changes of L2-L5  laminectomies, posterior fusion instrumentation and interbody disc  spacer placement. Mild degenerative changes throughout the thoracic  spine. Schmorl's node changes of the inferior T12 endplate.      Impression    IMPRESSION:   1. No evidence of metastatic disease in the chest, abdomen or pelvis.  2. Postprocedural changes of TRUBT with a 2.2 x 0.9 cm enhancing mass  along the left trigone, representing residual malignancy. FDG avidity  in this lesion cannot be determined due to overlying radiotracer  excretion (even post Lasix).    2a. A second focal area of mild hypermetabolism and trace enhancement  along the anterior bladder just left of midline,  indeterminant may  represent a second focus of disease or   postprocedural inflammation.     3. Sub-5 mm solid pulmonary nodules as described above, stable since  2017.     I have personally reviewed the examination and initial interpretation  and I agree with the findings.    SYLVIA DONG MD     *Note: Due to a large number of results and/or encounters for the requested time period, some results have not been displayed. A complete set of results can be found in Results Review.         ASSESSMENT AND PLAN   High grade muscle invasive bladder cancer involving the left trigone  Medical comorbidities including chronic kidney disease stage III A, carotid stenosis  Previously treated breast cancer  ECOG PS 0    Michaela is being followed after having PET-CT scan. She is not interested to participate in the clinical trial.     I have reviewed actual images from her PET-CT scan. There is uptake in the known site of disease along the left trigone. There was a small area of uptake in anterior wall of bladder which is indeterminate. There is no evidence of metastatic disease.     We do not have any recent lab values for her. She did not get any labs drawn despite having a PET scan done. I do not have any recent creatinine values for her. She should get labs drawn as soon as possible. This would help us plan for her chemotherapy. If she has creatinine clearance between 45 and 60 we could consider split dose cisplatin. If it is any lower, we would have to forego chemotherapy and refer her directly for surgery.     I again reviewed the chemotherapy regimen including side effects and logistics. We discussed the individual side effects of cisplatin and gemcitabine. Most notably, we discussed tinnitus, hearing deficits, nephrotoxicity, peripheral neuropathy, nausea, and vomiting with cisplatin. We also reviewed the myelosuppression with these agents, and in particular thrombocytopenia, which is fairly common with gemcitabine.  Both agents together could contribute to fatigue, diminished appetite, altered taste, neutropenia and possibly neutropenic fever. Patients have to take every temperature greater than 100.4F seriously and immediately call us to care. In case she is neutropenic or would potentially get neutropenic soon, we would admit her and treat with IV antibiotics for 48 hrs.    We would plan for 2 cycles of chemotherapy with cisplatin and gemcitabine followed by a PET-CT scan. I would like to see her prior to her 3rd cycle with the PET-CT scan. She will be followed by one of my nurse practitioners prior to cycle 1 and 2. We do not necessarily need a provider visit prior to cycle 1.     Telephone duration 32 min    Flaco Cuellar  Hematologist and Medical Oncologist  [facility]

## 2021-04-13 DIAGNOSIS — F41.9 ANXIETY: ICD-10-CM

## 2021-04-13 DIAGNOSIS — C67.9 UROTHELIAL CARCINOMA OF BLADDER WITH INVASION OF MUSCLE (H): ICD-10-CM

## 2021-04-13 DIAGNOSIS — F43.21 ADJUSTMENT DISORDER WITH DEPRESSED MOOD: ICD-10-CM

## 2021-04-13 DIAGNOSIS — Z11.59 ENCOUNTER FOR SCREENING FOR OTHER VIRAL DISEASES: ICD-10-CM

## 2021-04-13 LAB
ALBUMIN SERPL-MCNC: 4.1 G/DL (ref 3.4–5)
ALP SERPL-CCNC: 51 U/L (ref 40–150)
ALT SERPL W P-5'-P-CCNC: 42 U/L (ref 0–50)
ANION GAP SERPL CALCULATED.3IONS-SCNC: 6 MMOL/L (ref 3–14)
AST SERPL W P-5'-P-CCNC: 25 U/L (ref 0–45)
BASOPHILS # BLD AUTO: 0.1 10E9/L (ref 0–0.2)
BASOPHILS NFR BLD AUTO: 1 %
BILIRUB SERPL-MCNC: 0.3 MG/DL (ref 0.2–1.3)
BUN SERPL-MCNC: 37 MG/DL (ref 7–30)
CALCIUM SERPL-MCNC: 9.4 MG/DL (ref 8.5–10.1)
CHLORIDE SERPL-SCNC: 106 MMOL/L (ref 94–109)
CO2 SERPL-SCNC: 25 MMOL/L (ref 20–32)
CREAT SERPL-MCNC: 1.07 MG/DL (ref 0.52–1.04)
DIFFERENTIAL METHOD BLD: ABNORMAL
EOSINOPHIL # BLD AUTO: 0.4 10E9/L (ref 0–0.7)
EOSINOPHIL NFR BLD AUTO: 5.5 %
ERYTHROCYTE [DISTWIDTH] IN BLOOD BY AUTOMATED COUNT: 13.6 % (ref 10–15)
GFR SERPL CREATININE-BSD FRML MDRD: 52 ML/MIN/{1.73_M2}
GLUCOSE SERPL-MCNC: 82 MG/DL (ref 70–99)
HCT VFR BLD AUTO: 32.2 % (ref 35–47)
HGB BLD-MCNC: 10.5 G/DL (ref 11.7–15.7)
IMM GRANULOCYTES # BLD: 0 10E9/L (ref 0–0.4)
IMM GRANULOCYTES NFR BLD: 0.3 %
LYMPHOCYTES # BLD AUTO: 2 10E9/L (ref 0.8–5.3)
LYMPHOCYTES NFR BLD AUTO: 28.4 %
MAGNESIUM SERPL-MCNC: 2.5 MG/DL (ref 1.6–2.3)
MCH RBC QN AUTO: 34.7 PG (ref 26.5–33)
MCHC RBC AUTO-ENTMCNC: 32.6 G/DL (ref 31.5–36.5)
MCV RBC AUTO: 106 FL (ref 78–100)
MONOCYTES # BLD AUTO: 0.6 10E9/L (ref 0–1.3)
MONOCYTES NFR BLD AUTO: 7.9 %
NEUTROPHILS # BLD AUTO: 4 10E9/L (ref 1.6–8.3)
NEUTROPHILS NFR BLD AUTO: 56.9 %
NRBC # BLD AUTO: 0 10*3/UL
NRBC BLD AUTO-RTO: 0 /100
PLATELET # BLD AUTO: 266 10E9/L (ref 150–450)
POTASSIUM SERPL-SCNC: 4.7 MMOL/L (ref 3.4–5.3)
PROT SERPL-MCNC: 7.4 G/DL (ref 6.8–8.8)
RBC # BLD AUTO: 3.03 10E12/L (ref 3.8–5.2)
SARS-COV-2 RNA RESP QL NAA+PROBE: NORMAL
SODIUM SERPL-SCNC: 137 MMOL/L (ref 133–144)
SPECIMEN SOURCE: NORMAL
WBC # BLD AUTO: 7 10E9/L (ref 4–11)

## 2021-04-13 PROCEDURE — 36415 COLL VENOUS BLD VENIPUNCTURE: CPT | Performed by: INTERNAL MEDICINE

## 2021-04-13 PROCEDURE — 85025 COMPLETE CBC W/AUTO DIFF WBC: CPT | Performed by: INTERNAL MEDICINE

## 2021-04-13 PROCEDURE — 83735 ASSAY OF MAGNESIUM: CPT | Performed by: INTERNAL MEDICINE

## 2021-04-13 PROCEDURE — 87635 SARS-COV-2 COVID-19 AMP PRB: CPT | Performed by: INTERNAL MEDICINE

## 2021-04-13 PROCEDURE — 80053 COMPREHEN METABOLIC PANEL: CPT | Performed by: INTERNAL MEDICINE

## 2021-04-14 DIAGNOSIS — E78.5 HYPERLIPIDEMIA LDL GOAL <130: ICD-10-CM

## 2021-04-14 NOTE — TELEPHONE ENCOUNTER
Pending Prescriptions:                       Disp   Refills    ALPRAZolam (XANAX) 0.5 MG tablet [Pharmacy*30 tab*         Sig: TAKE 1 TABLET BY MOUTH DAILY AT BEDTIME AS NEEDED FOR           SLEEP        Routing refill request to provider for review/approval because:  Drug not on the [facility] refill protocol   Last Seen: 03/2021  Last Refilled: 03/01/21   Next Visit: LIV Hutchinson RN, BSN  [place]  April 14, 2021

## 2021-04-15 RX ORDER — LORAZEPAM 0.5 MG/1
0.5 TABLET ORAL EVERY 4 HOURS PRN
Qty: 30 TABLET | Refills: 3 | Status: SHIPPED | OUTPATIENT
Start: 2021-04-15 | End: 2021-12-14 | Stop reason: ALTCHOICE

## 2021-04-15 RX ORDER — ATORVASTATIN CALCIUM 20 MG/1
20 TABLET, FILM COATED ORAL DAILY
Qty: 90 TABLET | Refills: 1 | Status: SHIPPED | OUTPATIENT
Start: 2021-04-15 | End: 2021-07-22

## 2021-04-15 RX ORDER — DEXAMETHASONE 4 MG/1
4 TABLET ORAL
Qty: 12 TABLET | Refills: 3 | Status: SHIPPED | OUTPATIENT
Start: 2021-04-15 | End: 2021-05-14

## 2021-04-15 RX ORDER — PROCHLORPERAZINE MALEATE 10 MG
5 TABLET ORAL EVERY 6 HOURS PRN
Qty: 30 TABLET | Refills: 3 | Status: SHIPPED | OUTPATIENT
Start: 2021-04-15 | End: 2021-07-21

## 2021-04-15 RX ORDER — ALPRAZOLAM 0.5 MG
TABLET ORAL
Qty: 30 TABLET | Refills: 0 | Status: SHIPPED | OUTPATIENT
Start: 2021-04-15 | End: 2021-06-17

## 2021-04-15 NOTE — TELEPHONE ENCOUNTER
Prescription approved per [facility] Refill Protocol.  Merrick Hutchinson RN, BSN  [place]  April 15, 2021

## 2021-04-16 ENCOUNTER — HOSPITAL ENCOUNTER (OUTPATIENT)
Facility: CLINIC | Age: 71
Discharge: HOME | End: 2021-04-16
Attending: RADIOLOGY | Admitting: RADIOLOGY
Payer: MEDICARE

## 2021-04-16 ENCOUNTER — APPOINTMENT (OUTPATIENT)
Dept: INTERVENTIONAL RADIOLOGY/VASCULAR | Facility: CLINIC | Age: 71
End: 2021-04-16
Attending: INTERNAL MEDICINE
Payer: MEDICARE

## 2021-04-16 VITALS
RESPIRATION RATE: 16 BRPM | OXYGEN SATURATION: 95 % | SYSTOLIC BLOOD PRESSURE: 151 MMHG | DIASTOLIC BLOOD PRESSURE: 79 MMHG | HEART RATE: 80 BPM | TEMPERATURE: 97.8 F

## 2021-04-16 DIAGNOSIS — C50.412 MALIGNANT NEOPLASM OF UPPER-OUTER QUADRANT OF LEFT FEMALE BREAST, UNSPECIFIED ESTROGEN RECEPTOR STATUS (H): ICD-10-CM

## 2021-04-16 DIAGNOSIS — C77.5 SECONDARY AND UNSPECIFIED MALIGNANT NEOPLASM OF INTRAPELVIC LYMPH NODES (H): ICD-10-CM

## 2021-04-16 DIAGNOSIS — C67.9 UROTHELIAL CARCINOMA OF BLADDER WITH INVASION OF MUSCLE (H): ICD-10-CM

## 2021-04-16 LAB — INR PPP: 0.91 (ref 0.86–1.14)

## 2021-04-16 PROCEDURE — 272N000196 HC ACCESSORY CR5

## 2021-04-16 PROCEDURE — 250N000011 HC RX IP 250 OP 636: Performed by: NURSE PRACTITIONER

## 2021-04-16 PROCEDURE — 36561 INSERT TUNNELED CV CATH: CPT

## 2021-04-16 PROCEDURE — 272N000602 HC WOUND GLUE CR1

## 2021-04-16 PROCEDURE — 99152 MOD SED SAME PHYS/QHP 5/>YRS: CPT

## 2021-04-16 PROCEDURE — 999N000163 HC STATISTIC SIMPLE TUBE INSERTION/CHARGE, PORT, CATH, FISTULOGRAM

## 2021-04-16 PROCEDURE — 85610 PROTHROMBIN TIME: CPT | Performed by: RADIOLOGY

## 2021-04-16 PROCEDURE — C1788 PORT, INDWELLING, IMP: HCPCS

## 2021-04-16 PROCEDURE — 250N000009 HC RX 250: Performed by: NURSE PRACTITIONER

## 2021-04-16 PROCEDURE — 36415 COLL VENOUS BLD VENIPUNCTURE: CPT

## 2021-04-16 PROCEDURE — 250N000011 HC RX IP 250 OP 636: Performed by: RADIOLOGY

## 2021-04-16 RX ORDER — NALOXONE HYDROCHLORIDE 0.4 MG/ML
0.2 INJECTION, SOLUTION INTRAMUSCULAR; INTRAVENOUS; SUBCUTANEOUS
Status: DISCONTINUED | OUTPATIENT
Start: 2021-04-16 | End: 2021-04-16 | Stop reason: HOSPADM

## 2021-04-16 RX ORDER — HEPARIN SODIUM,PORCINE 10 UNIT/ML
5 VIAL (ML) INTRAVENOUS EVERY 24 HOURS
Status: DISCONTINUED | OUTPATIENT
Start: 2021-04-16 | End: 2021-04-16 | Stop reason: HOSPADM

## 2021-04-16 RX ORDER — CEFAZOLIN SODIUM 2 G/100ML
2 INJECTION, SOLUTION INTRAVENOUS
Status: COMPLETED | OUTPATIENT
Start: 2021-04-16 | End: 2021-04-16

## 2021-04-16 RX ORDER — NALOXONE HYDROCHLORIDE 0.4 MG/ML
0.4 INJECTION, SOLUTION INTRAMUSCULAR; INTRAVENOUS; SUBCUTANEOUS
Status: DISCONTINUED | OUTPATIENT
Start: 2021-04-16 | End: 2021-04-16 | Stop reason: HOSPADM

## 2021-04-16 RX ORDER — FLUMAZENIL 0.1 MG/ML
0.2 INJECTION, SOLUTION INTRAVENOUS
Status: DISCONTINUED | OUTPATIENT
Start: 2021-04-16 | End: 2021-04-16 | Stop reason: HOSPADM

## 2021-04-16 RX ORDER — HEPARIN SODIUM (PORCINE) LOCK FLUSH IV SOLN 100 UNIT/ML 100 UNIT/ML
5 SOLUTION INTRAVENOUS
Status: DISCONTINUED | OUTPATIENT
Start: 2021-04-16 | End: 2021-04-16 | Stop reason: HOSPADM

## 2021-04-16 RX ORDER — FENTANYL CITRATE 50 UG/ML
25-50 INJECTION, SOLUTION INTRAMUSCULAR; INTRAVENOUS EVERY 5 MIN PRN
Status: DISCONTINUED | OUTPATIENT
Start: 2021-04-16 | End: 2021-04-16 | Stop reason: HOSPADM

## 2021-04-16 RX ORDER — ACETAMINOPHEN 325 MG/1
650-975 TABLET ORAL
Status: DISCONTINUED | OUTPATIENT
Start: 2021-04-16 | End: 2021-04-16 | Stop reason: HOSPADM

## 2021-04-16 RX ORDER — LIDOCAINE 40 MG/G
CREAM TOPICAL
Status: DISCONTINUED | OUTPATIENT
Start: 2021-04-16 | End: 2021-04-16 | Stop reason: HOSPADM

## 2021-04-16 RX ORDER — HEPARIN SODIUM (PORCINE) LOCK FLUSH IV SOLN 100 UNIT/ML 100 UNIT/ML
500 SOLUTION INTRAVENOUS EVERY 8 HOURS
Status: DISCONTINUED | OUTPATIENT
Start: 2021-04-16 | End: 2021-04-16 | Stop reason: HOSPADM

## 2021-04-16 RX ADMIN — FENTANYL CITRATE 25 MCG: 50 INJECTION, SOLUTION INTRAMUSCULAR; INTRAVENOUS at 14:20

## 2021-04-16 RX ADMIN — FENTANYL CITRATE 50 MCG: 50 INJECTION, SOLUTION INTRAMUSCULAR; INTRAVENOUS at 14:08

## 2021-04-16 RX ADMIN — HEPARIN SODIUM (PORCINE) LOCK FLUSH IV SOLN 100 UNIT/ML 500 UNITS: 100 SOLUTION at 14:35

## 2021-04-16 RX ADMIN — LIDOCAINE HYDROCHLORIDE 10 ML: 10 INJECTION, SOLUTION INFILTRATION; PERINEURAL at 14:22

## 2021-04-16 RX ADMIN — MIDAZOLAM HYDROCHLORIDE 0.5 MG: 1 INJECTION, SOLUTION INTRAMUSCULAR; INTRAVENOUS at 14:20

## 2021-04-16 RX ADMIN — CEFAZOLIN SODIUM 2 G: 2 INJECTION, SOLUTION INTRAVENOUS at 13:19

## 2021-04-16 RX ADMIN — MIDAZOLAM HYDROCHLORIDE 1 MG: 1 INJECTION, SOLUTION INTRAMUSCULAR; INTRAVENOUS at 14:07

## 2021-04-16 NOTE — PROGRESS NOTES
PATIENT/VISITOR WELLNESS SCREENING    Step 1 Patient Screening    1. In the last month, have you been in contact with someone who was confirmed or suspected to have Coronavirus/COVID-19? No    2. Do you have the following symptoms?  Fever/Chills? No   Cough? No   Shortness of breath? No   New loss of taste or smell? No  Sore throat? No  Muscle or body aches? No  Headaches? No  Fatigue? No  Vomiting or diarrhea? No

## 2021-04-16 NOTE — PROGRESS NOTES
Care Suites Admission Nursing Note    Patient Information  Name: Michaela Dorman  Age: 70 year old  Reason for admission: Port placement  Care Suites arrival time: 1140      Patient Admission/Assessment   Pre-procedure assessment complete: Yes  If abnormal assessment/labs, provider notified: No  NPO: Yes  Medications held per instructions/orders: N/A  Consent: deferred  If applicable, pregnancy test status: deferred  Patient oriented to room: Yes  Education/questions answered: Yes  Plan/other: AVS reviewed    Discharge Planning  Discharge name/phone number: spouse    Overnight post sedation caregiver: Spouse  Discharge location: home    Debbie Naqvi RN

## 2021-04-16 NOTE — DISCHARGE INSTRUCTIONS
Port Insertion Discharge Instructions     After you go home:      Have an adult stay with you for the first 6 hours    You may resume your normal diet       For 24 hours - due to the sedation you received:    Relax and take it easy    Do NOT make any important or legal decisions    Do NOT drive or operate machines at home or at work    Do NOT drink alcohol    Care of Puncture Sites:      Keep the dressings on your sites clean & dry for 3 days. Change it only if it gets wet or dirty.    You may shower after the dressing comes off in 3 days    Do not remove the small white strips of tape, if present. Allow them to fall off on their own.     You may cover the wound with a bandaid after the dressing is removed if needed for comfort      Activity       Avoid heavy lifting (greater than 10 pounds) or the overuse of your shoulder for 3 days    Bleeding:      If you start bleeding from the incision sites in your chest or neck - or have swelling in your neck, sit down and press on the site for 5-10 minutes.     If bleeding has not stopped after 10 minutes, call your provider.        Call 911 right away if you have heavy bleeding or bleeding that does not stop.      Medicines:      You may resume all medications    Resume your Warfarin/Coumadin at your regular dose today. Follow up with your provider to have your INR rechecked    Resume your Platelet Inhibitors and Aspirin tomorrow at your regular dose    For minor pain, you may take Acetaminophen (Tylenol) or Ibuprofen (Advil)    Call the provider who ordered this procedure if:      You have swelling in your neck or over your port site    The incision area is red, swollen, hot or tender    You have chills or a fever greater than 101 F (38 C)    Any questions or concerns    Call  911 or go to the Emergency Room if you have:      Severe chest pain or trouble breathing    Bleeding that you cannot control    Additional Information:      Your port may be accessed right away.      You will need to have your port flushed every 30 days or after each use.      If you have questions call:          [facility] Radiology Dept @ [phone]      The provider who performed your procedure was _________________.

## 2021-04-16 NOTE — PROGRESS NOTES
Care Suites Post Procedure Note    Patient Information  Name: Michaela Dorman  Age: 70 year old    Post Procedure  Time patient returned to Care Suites: 1445  Concerns/abnormal assessment: no immediate  If abnormal assessment, provider notified: N/A  Plan/Other: Continue post procedure plan of care.    Right chest port site dressing CDI.  VS stable, taking po fluids well.  Denies any pain or discomfort. Voiding post bedrest and tolerated well. Anticipate discharge pt by 1545  Power port booklet given to pt.    Brian Alba RN     Care Suites Discharge Nursing Note    Patient Information  Name: Michaela Dorman  Age: 70 year old    Discharge Education:  Discharge instructions reviewed: Yes  Additional education/resources provided: NA  Patient/patient representative verbalizes understanding: Yes  Patient discharging on new medications: No  Medication education completed: N/A    Discharge Plans:   Discharge location: home  Discharge ride contacted: Yes  Approximate discharge time: 1545    Discharge Criteria:  Discharge criteria met and vital signs stable: Yes    Patient Belongs:  Patient belongings returned to patient: Yes    Brian Alba RN

## 2021-04-16 NOTE — IR NOTE
Interventional Radiology Intra-procedural Nursing Note    Patient Name: Michaela Dorman  Medical Record Number: [id number]  Today's Date: April 16, 2021    Start Time: 1410  End of procedure time: 1436  Procedure: Port a catheter placement with intravenous moderate sedation   Report given to: in Care Suites RN  Time pt departs:  1444    Other Notes: Pt into IR suite 1 via cart. Pt awake and alert. To table in supine position. Monitoring equipment applied. VSS. Tele SR. Dr. Sullivan in room. Time out and procedure started. Pt tolerated procedure well. Debrief with Dr. Sullivan. Port placed and pressure held until hemostasis achieved. Dressing clean, dry and intact. No complications. Pt transferred back to Care Suites.    Medications:    Versed 1.5 mg  Fentanyl 75mcg  Lidocaine 1% 10 ml  Lidocaine 1% with EPI 2 ml    Roger Yarbrough RN

## 2021-04-16 NOTE — PRE-PROCEDURE
GENERAL PRE-PROCEDURE:   Procedure:  Port a catheter placement with intravenous moderate sedation   Date/Time:  4/16/2021 12:35 PM    Written consent obtained?: Yes    Risks and benefits: Risks, benefits and alternatives were discussed    Consent given by:  Patient  Patient states understanding of procedure being performed: Yes    Patient's understanding of procedure matches consent: Yes    Procedure consent matches procedure scheduled: Yes    Expected level of sedation:  Moderate  Appropriately NPO:  Yes  ASA Class:  Class 3- Severe systemic disease, definite functional limitations  Mallampati  :  Grade 3- soft palate visible, posterior pharyngeal wall not visible  Lungs:  Lungs clear with good breath sounds bilaterally  Heart:  Normal heart sounds and rate  History & Physical reviewed:  History and physical reviewed and no updates needed  Statement of review:  I have reviewed the lab findings, diagnostic data, medications, and the plan for sedation

## 2021-04-20 ENCOUNTER — OFFICE VISIT (OUTPATIENT)
Dept: INFUSION THERAPY | Facility: CLINIC | Age: 71
End: 2021-04-20
Attending: INTERNAL MEDICINE
Payer: MEDICARE

## 2021-04-20 VITALS
WEIGHT: 137 LBS | DIASTOLIC BLOOD PRESSURE: 80 MMHG | RESPIRATION RATE: 16 BRPM | SYSTOLIC BLOOD PRESSURE: 158 MMHG | HEART RATE: 63 BPM | TEMPERATURE: 98.1 F | OXYGEN SATURATION: 96 % | BODY MASS INDEX: 21.78 KG/M2

## 2021-04-20 DIAGNOSIS — C67.9 UROTHELIAL CARCINOMA OF BLADDER WITH INVASION OF MUSCLE (H): Primary | ICD-10-CM

## 2021-04-20 DIAGNOSIS — Z51.11 ENCOUNTER FOR ANTINEOPLASTIC CHEMOTHERAPY: ICD-10-CM

## 2021-04-20 DIAGNOSIS — C50.412 MALIGNANT NEOPLASM OF UPPER-OUTER QUADRANT OF LEFT FEMALE BREAST, UNSPECIFIED ESTROGEN RECEPTOR STATUS (H): ICD-10-CM

## 2021-04-20 LAB
ALBUMIN SERPL-MCNC: 3.9 G/DL (ref 3.4–5)
ALP SERPL-CCNC: 51 U/L (ref 40–150)
ALT SERPL W P-5'-P-CCNC: 31 U/L (ref 0–50)
ANION GAP SERPL CALCULATED.3IONS-SCNC: 5 MMOL/L (ref 3–14)
AST SERPL W P-5'-P-CCNC: 19 U/L (ref 0–45)
BASOPHILS # BLD AUTO: 0.1 10E9/L (ref 0–0.2)
BASOPHILS NFR BLD AUTO: 1.1 %
BILIRUB SERPL-MCNC: 0.3 MG/DL (ref 0.2–1.3)
BUN SERPL-MCNC: 28 MG/DL (ref 7–30)
CALCIUM SERPL-MCNC: 9.4 MG/DL (ref 8.5–10.1)
CHLORIDE SERPL-SCNC: 108 MMOL/L (ref 94–109)
CO2 SERPL-SCNC: 27 MMOL/L (ref 20–32)
CREAT SERPL-MCNC: 0.9 MG/DL (ref 0.52–1.04)
DIFFERENTIAL METHOD BLD: ABNORMAL
EOSINOPHIL # BLD AUTO: 0.4 10E9/L (ref 0–0.7)
EOSINOPHIL NFR BLD AUTO: 7.9 %
ERYTHROCYTE [DISTWIDTH] IN BLOOD BY AUTOMATED COUNT: 13 % (ref 10–15)
GFR SERPL CREATININE-BSD FRML MDRD: 64 ML/MIN/{1.73_M2}
GLUCOSE SERPL-MCNC: 86 MG/DL (ref 70–99)
HCT VFR BLD AUTO: 32.7 % (ref 35–47)
HGB BLD-MCNC: 10.5 G/DL (ref 11.7–15.7)
IMM GRANULOCYTES # BLD: 0 10E9/L (ref 0–0.4)
IMM GRANULOCYTES NFR BLD: 0.4 %
LYMPHOCYTES # BLD AUTO: 1.6 10E9/L (ref 0.8–5.3)
LYMPHOCYTES NFR BLD AUTO: 29.8 %
MAGNESIUM SERPL-MCNC: 2.1 MG/DL (ref 1.6–2.3)
MCH RBC QN AUTO: 34.5 PG (ref 26.5–33)
MCHC RBC AUTO-ENTMCNC: 32.1 G/DL (ref 31.5–36.5)
MCV RBC AUTO: 108 FL (ref 78–100)
MONOCYTES # BLD AUTO: 0.5 10E9/L (ref 0–1.3)
MONOCYTES NFR BLD AUTO: 8.8 %
NEUTROPHILS # BLD AUTO: 2.8 10E9/L (ref 1.6–8.3)
NEUTROPHILS NFR BLD AUTO: 52 %
NRBC # BLD AUTO: 0 10*3/UL
NRBC BLD AUTO-RTO: 0 /100
PLATELET # BLD AUTO: 235 10E9/L (ref 150–450)
POTASSIUM SERPL-SCNC: 4.1 MMOL/L (ref 3.4–5.3)
PROT SERPL-MCNC: 7.4 G/DL (ref 6.8–8.8)
RBC # BLD AUTO: 3.04 10E12/L (ref 3.8–5.2)
SODIUM SERPL-SCNC: 140 MMOL/L (ref 133–144)
WBC # BLD AUTO: 5.4 10E9/L (ref 4–11)

## 2021-04-20 PROCEDURE — 83735 ASSAY OF MAGNESIUM: CPT | Performed by: INTERNAL MEDICINE

## 2021-04-20 PROCEDURE — 96413 CHEMO IV INFUSION 1 HR: CPT

## 2021-04-20 PROCEDURE — 80053 COMPREHEN METABOLIC PANEL: CPT | Performed by: INTERNAL MEDICINE

## 2021-04-20 PROCEDURE — 85025 COMPLETE CBC W/AUTO DIFF WBC: CPT | Performed by: INTERNAL MEDICINE

## 2021-04-20 PROCEDURE — 258N000003 HC RX IP 258 OP 636: Performed by: INTERNAL MEDICINE

## 2021-04-20 PROCEDURE — 250N000011 HC RX IP 250 OP 636: Performed by: INTERNAL MEDICINE

## 2021-04-20 PROCEDURE — 96417 CHEMO IV INFUS EACH ADDL SEQ: CPT

## 2021-04-20 PROCEDURE — 96375 TX/PRO/DX INJ NEW DRUG ADDON: CPT

## 2021-04-20 PROCEDURE — 96361 HYDRATE IV INFUSION ADD-ON: CPT

## 2021-04-20 PROCEDURE — 96367 TX/PROPH/DG ADDL SEQ IV INF: CPT

## 2021-04-20 RX ORDER — OXYCODONE AND ACETAMINOPHEN 5; 325 MG/1; MG/1
.5-1 TABLET ORAL EVERY 6 HOURS PRN
COMMUNITY
End: 2021-06-17

## 2021-04-20 RX ORDER — PALONOSETRON 0.05 MG/ML
0.25 INJECTION, SOLUTION INTRAVENOUS ONCE
Status: COMPLETED | OUTPATIENT
Start: 2021-04-20 | End: 2021-04-20

## 2021-04-20 RX ORDER — HEPARIN SODIUM (PORCINE) LOCK FLUSH IV SOLN 100 UNIT/ML 100 UNIT/ML
5 SOLUTION INTRAVENOUS
Status: DISCONTINUED | OUTPATIENT
Start: 2021-04-20 | End: 2021-04-20 | Stop reason: HOSPADM

## 2021-04-20 RX ADMIN — PALONOSETRON HYDROCHLORIDE 0.25 MG: 0.05 INJECTION, SOLUTION INTRAVENOUS at 09:18

## 2021-04-20 RX ADMIN — Medication 5 ML: at 11:50

## 2021-04-20 RX ADMIN — CISPLATIN 59 MG: 1 INJECTION INTRAVENOUS at 10:32

## 2021-04-20 RX ADMIN — FOSAPREPITANT DIMEGLUMINE: 150 INJECTION, POWDER, LYOPHILIZED, FOR SOLUTION INTRAVENOUS at 09:06

## 2021-04-20 RX ADMIN — SODIUM CHLORIDE 1000 ML: 9 INJECTION, SOLUTION INTRAVENOUS at 08:28

## 2021-04-20 RX ADMIN — GEMCITABINE 1600 MG: 38 INJECTION, SOLUTION INTRAVENOUS at 09:46

## 2021-04-20 ASSESSMENT — PAIN SCALES - GENERAL: PAINLEVEL: NO PAIN (0)

## 2021-04-20 NOTE — LETTER
4/20/2021         RE: Michaela Dorman  [address]  [facility] [address]        Dear Colleague,    Thank you for referring your patient, Michaela Dorman, to the [facility] [place]. Please see a copy of my visit note below.    Infusion Nursing Note:  Michaela Dorman presents today for C1D1 Cisplatin/Gemzar.    Patient seen by provider today: No   present during visit today: Not Applicable.    Note: Patient arrived for 1st cycle. Has been an Infusion patient in the past. Reviewed treatment plan and take home meds. She denies pain other than occasional generalized mild aches/pains. Hx back pain, surgery Feb 2020. She walks 3 miles a day. Mild anxiety taking Lexapro, states it is stable. VSS, afebrile. See ONC assessment flowsheet for detailed assessment.   Patient did criteria for an asymptomatic covid-19 PCR test in infusion today. Patient declined the covid-19 test. Had negative Covid 19 test on 4/13/21.  Chemotherapy regimen reviewed; BSA and dosing double checked with other Chemo RN.     Intravenous Access:    Labs drawn without difficulty.  Implanted Port.  Lab draw site Right chest wall, Needle type Power, Gauge 19,3/4in    Treatment Conditions:  Lab Results   Component Value Date    HGB 10.5 04/20/2021     Lab Results   Component Value Date    WBC 5.4 04/20/2021      Lab Results   Component Value Date    ANEU 2.8 04/20/2021     Lab Results   Component Value Date     04/20/2021      Lab Results   Component Value Date     04/20/2021                   Lab Results   Component Value Date    POTASSIUM 4.1 04/20/2021           Lab Results   Component Value Date    MAG 2.1 04/20/2021            Lab Results   Component Value Date    CR 0.90 04/20/2021                   Lab Results   Component Value Date    VANDANA 9.4 04/20/2021                Lab Results   Component Value Date    BILITOTAL 0.3 04/20/2021           Lab Results   Component Value Date    ALBUMIN  3.9 04/20/2021                    Lab Results   Component Value Date    ALT 31 04/20/2021           Lab Results   Component Value Date    AST 19 04/20/2021       Results reviewed, labs MET treatment parameters, ok to proceed with treatment.      Post Infusion Assessment:  Patient tolerated infusion without incident. B/P elevated at end of infusion. This is a trend for her. 158/80 prior to discharge. Asymptomatic. She checks her B/P routinely at home and will call if continues to be increased or symptomatic.   Blood return noted pre and post infusion.  Site patent and intact, free from redness, edema or discomfort.  No evidence of extravasations.  Access discontinued per protocol.       Discharge Plan:   Patient and/or family verbalized understanding of discharge instructions and all questions answered.  AVS to patient via [employer].  Patient will return 4/27 for next appointment.   Patient discharged in stable condition accompanied by: self.  Departure Mode: Ambulatory.    Amy Renee RN                          Again, thank you for allowing me to participate in the care of your patient.        Sincerely,        6 [address] Chair 1

## 2021-04-20 NOTE — PROGRESS NOTES
Infusion Nursing Note:  Michaela Dorman presents today for C1D1 Cisplatin/Gemzar.    Patient seen by provider today: No   present during visit today: Not Applicable.    Note: Patient arrived for 1st cycle. Has been an Infusion patient in the past. Reviewed treatment plan and take home meds. She denies pain other than occasional generalized mild aches/pains. Hx back pain, surgery Feb 2020. She walks 3 miles a day. Mild anxiety taking Lexapro, states it is stable. VSS, afebrile. See ONC assessment flowsheet for detailed assessment.   Patient did criteria for an asymptomatic covid-19 PCR test in infusion today. Patient declined the covid-19 test. Had negative Covid 19 test on 4/13/21.  Chemotherapy regimen reviewed; BSA and dosing double checked with other Chemo RN.     Intravenous Access:    Labs drawn without difficulty.  Implanted Port.  Lab draw site Right chest wall, Needle type Power, Gauge 19,3/4in    Treatment Conditions:  Lab Results   Component Value Date    HGB 10.5 04/20/2021     Lab Results   Component Value Date    WBC 5.4 04/20/2021      Lab Results   Component Value Date    ANEU 2.8 04/20/2021     Lab Results   Component Value Date     04/20/2021      Lab Results   Component Value Date     04/20/2021                   Lab Results   Component Value Date    POTASSIUM 4.1 04/20/2021           Lab Results   Component Value Date    MAG 2.1 04/20/2021            Lab Results   Component Value Date    CR 0.90 04/20/2021                   Lab Results   Component Value Date    VANDANA 9.4 04/20/2021                Lab Results   Component Value Date    BILITOTAL 0.3 04/20/2021           Lab Results   Component Value Date    ALBUMIN 3.9 04/20/2021                    Lab Results   Component Value Date    ALT 31 04/20/2021           Lab Results   Component Value Date    AST 19 04/20/2021       Results reviewed, labs MET treatment parameters, ok to proceed with treatment.      Post Infusion  Assessment:  Patient tolerated infusion without incident. B/P elevated at end of infusion. This is a trend for her. 158/80 prior to discharge. Asymptomatic. She checks her B/P routinely at home and will call if continues to be increased or symptomatic.   Blood return noted pre and post infusion.  Site patent and intact, free from redness, edema or discomfort.  No evidence of extravasations.  Access discontinued per protocol.       Discharge Plan:   Patient and/or family verbalized understanding of discharge instructions and all questions answered.  AVS to patient via [employer].  Patient will return 4/27 for next appointment.   Patient discharged in stable condition accompanied by: self.  Departure Mode: Ambulatory.    Amy Renee RN

## 2021-04-20 NOTE — PATIENT INSTRUCTIONS
Take home meds picked up by patient and discussed. Will take as indicated and call if any concerning symptoms or questions.   Return for next chemotherapy infusion on 4/27/21. Patient declines paper copy of labs/visit details. Has available on [employer].

## 2021-04-22 ENCOUNTER — TELEPHONE (OUTPATIENT)
Dept: ONCOLOGY | Facility: CLINIC | Age: 71
End: 2021-04-22

## 2021-04-22 NOTE — TELEPHONE ENCOUNTER
Late entry-    Spoke to Michaela about post chemotherapy medication regimen.   She's taking 3 days of dexamethasone and will start her compazine to stay on top of any nausea.    She is managing to stay hydrated , States she feels well.     Reviewed attending [facility] as per my chart message.   Advised her it would be ok as long as she does not have a fever, is not neutropenic, she wears a mask and there is social distancing.    She is aware to call us with any questions, concerns.

## 2021-04-22 NOTE — TELEPHONE ENCOUNTER
Late entry-  Michaela Cuellar, Flaco Cherry MD Yesterday (7:54 AM)        Good morning. Chemotherapy went good. I dont feel nausea yet but should I take compazine to prevent it or wait till I get nausea. Thankyou. Am I ok to go to [employer] and see a few people or do I need to kind of isolate from germs. With breast cancer I isolated

## 2021-04-27 ENCOUNTER — MYC MEDICAL ADVICE (OUTPATIENT)
Dept: NEPHROLOGY | Facility: CLINIC | Age: 71
End: 2021-04-27

## 2021-04-27 ENCOUNTER — INFUSION THERAPY VISIT (OUTPATIENT)
Dept: INFUSION THERAPY | Facility: CLINIC | Age: 71
End: 2021-04-27
Attending: INTERNAL MEDICINE
Payer: MEDICARE

## 2021-04-27 VITALS
HEIGHT: 67 IN | DIASTOLIC BLOOD PRESSURE: 58 MMHG | TEMPERATURE: 98.1 F | SYSTOLIC BLOOD PRESSURE: 130 MMHG | BODY MASS INDEX: 21.52 KG/M2 | RESPIRATION RATE: 18 BRPM | WEIGHT: 137.1 LBS | OXYGEN SATURATION: 97 % | HEART RATE: 70 BPM

## 2021-04-27 DIAGNOSIS — C67.9 UROTHELIAL CARCINOMA OF BLADDER WITH INVASION OF MUSCLE (H): Primary | ICD-10-CM

## 2021-04-27 DIAGNOSIS — N17.9 ACUTE RENAL FAILURE, UNSPECIFIED ACUTE RENAL FAILURE TYPE (H): ICD-10-CM

## 2021-04-27 DIAGNOSIS — Z51.11 ENCOUNTER FOR ANTINEOPLASTIC CHEMOTHERAPY: ICD-10-CM

## 2021-04-27 DIAGNOSIS — C50.412 MALIGNANT NEOPLASM OF UPPER-OUTER QUADRANT OF LEFT FEMALE BREAST, UNSPECIFIED ESTROGEN RECEPTOR STATUS (H): ICD-10-CM

## 2021-04-27 LAB
ALBUMIN SERPL-MCNC: 3.8 G/DL (ref 3.4–5)
ALBUMIN UR-MCNC: NEGATIVE MG/DL
ALP SERPL-CCNC: 56 U/L (ref 40–150)
ALT SERPL W P-5'-P-CCNC: 55 U/L (ref 0–50)
ANION GAP SERPL CALCULATED.3IONS-SCNC: 5 MMOL/L (ref 3–14)
APPEARANCE UR: ABNORMAL
AST SERPL W P-5'-P-CCNC: 22 U/L (ref 0–45)
BACTERIA #/AREA URNS HPF: ABNORMAL /HPF
BASOPHILS # BLD AUTO: 0.1 10E9/L (ref 0–0.2)
BASOPHILS NFR BLD AUTO: 1.2 %
BILIRUB SERPL-MCNC: 0.2 MG/DL (ref 0.2–1.3)
BILIRUB UR QL STRIP: NEGATIVE
BUN SERPL-MCNC: 42 MG/DL (ref 7–30)
CALCIUM SERPL-MCNC: 9.2 MG/DL (ref 8.5–10.1)
CHLORIDE SERPL-SCNC: 104 MMOL/L (ref 94–109)
CO2 SERPL-SCNC: 27 MMOL/L (ref 20–32)
COLOR UR AUTO: YELLOW
CREAT SERPL-MCNC: 1.9 MG/DL (ref 0.52–1.04)
CREAT UR-MCNC: 92 MG/DL
CREAT UR-MCNC: 92 MG/DL
DIFFERENTIAL METHOD BLD: ABNORMAL
EOSINOPHIL # BLD AUTO: 0.4 10E9/L (ref 0–0.7)
EOSINOPHIL NFR BLD AUTO: 8.9 %
ERYTHROCYTE [DISTWIDTH] IN BLOOD BY AUTOMATED COUNT: 12.8 % (ref 10–15)
FRACT EXCRET NA UR+SERPL-RTO: 0.3 %
GFR SERPL CREATININE-BSD FRML MDRD: 26 ML/MIN/{1.73_M2}
GLUCOSE SERPL-MCNC: 86 MG/DL (ref 70–99)
GLUCOSE UR STRIP-MCNC: NEGATIVE MG/DL
HCT VFR BLD AUTO: 32 % (ref 35–47)
HGB BLD-MCNC: 10.4 G/DL (ref 11.7–15.7)
HGB UR QL STRIP: ABNORMAL
HYALINE CASTS #/AREA URNS LPF: 10 /LPF (ref 0–2)
IMM GRANULOCYTES # BLD: 0 10E9/L (ref 0–0.4)
IMM GRANULOCYTES NFR BLD: 0.2 %
KETONES UR STRIP-MCNC: NEGATIVE MG/DL
LEUKOCYTE ESTERASE UR QL STRIP: ABNORMAL
LYMPHOCYTES # BLD AUTO: 1.6 10E9/L (ref 0.8–5.3)
LYMPHOCYTES NFR BLD AUTO: 38.6 %
MAGNESIUM SERPL-MCNC: 2 MG/DL (ref 1.6–2.3)
MCH RBC QN AUTO: 34.1 PG (ref 26.5–33)
MCHC RBC AUTO-ENTMCNC: 32.5 G/DL (ref 31.5–36.5)
MCV RBC AUTO: 105 FL (ref 78–100)
MONOCYTES # BLD AUTO: 0.2 10E9/L (ref 0–1.3)
MONOCYTES NFR BLD AUTO: 4.6 %
MUCOUS THREADS #/AREA URNS LPF: PRESENT /LPF
NEUTROPHILS # BLD AUTO: 1.9 10E9/L (ref 1.6–8.3)
NEUTROPHILS NFR BLD AUTO: 46.5 %
NITRATE UR QL: NEGATIVE
NRBC # BLD AUTO: 0 10*3/UL
NRBC BLD AUTO-RTO: 0 /100
PH UR STRIP: 5 PH (ref 5–7)
PLATELET # BLD AUTO: 157 10E9/L (ref 150–450)
POTASSIUM SERPL-SCNC: 4.5 MMOL/L (ref 3.4–5.3)
PROT SERPL-MCNC: 6.9 G/DL (ref 6.8–8.8)
RBC # BLD AUTO: 3.05 10E12/L (ref 3.8–5.2)
RBC #/AREA URNS AUTO: 4 /HPF (ref 0–2)
SODIUM SERPL-SCNC: 136 MMOL/L (ref 133–144)
SODIUM UR-SCNC: 17 MMOL/L
SOURCE: ABNORMAL
SP GR UR STRIP: 1.01 (ref 1–1.03)
SQUAMOUS #/AREA URNS AUTO: 6 /HPF (ref 0–1)
UROBILINOGEN UR STRIP-MCNC: 0 MG/DL (ref 0–2)
WBC # BLD AUTO: 4.1 10E9/L (ref 4–11)
WBC #/AREA URNS AUTO: 44 /HPF (ref 0–5)

## 2021-04-27 PROCEDURE — 250N000011 HC RX IP 250 OP 636: Performed by: INTERNAL MEDICINE

## 2021-04-27 PROCEDURE — 85025 COMPLETE CBC W/AUTO DIFF WBC: CPT | Performed by: INTERNAL MEDICINE

## 2021-04-27 PROCEDURE — 84300 ASSAY OF URINE SODIUM: CPT | Performed by: INTERNAL MEDICINE

## 2021-04-27 PROCEDURE — 83735 ASSAY OF MAGNESIUM: CPT | Performed by: INTERNAL MEDICINE

## 2021-04-27 PROCEDURE — 82570 ASSAY OF URINE CREATININE: CPT | Performed by: INTERNAL MEDICINE

## 2021-04-27 PROCEDURE — 96360 HYDRATION IV INFUSION INIT: CPT

## 2021-04-27 PROCEDURE — 81001 URINALYSIS AUTO W/SCOPE: CPT | Performed by: INTERNAL MEDICINE

## 2021-04-27 PROCEDURE — 258N000003 HC RX IP 258 OP 636: Performed by: INTERNAL MEDICINE

## 2021-04-27 PROCEDURE — 80053 COMPREHEN METABOLIC PANEL: CPT | Performed by: INTERNAL MEDICINE

## 2021-04-27 RX ORDER — HEPARIN SODIUM (PORCINE) LOCK FLUSH IV SOLN 100 UNIT/ML 100 UNIT/ML
5 SOLUTION INTRAVENOUS
Status: DISCONTINUED | OUTPATIENT
Start: 2021-04-27 | End: 2021-04-27 | Stop reason: HOSPADM

## 2021-04-27 RX ADMIN — SODIUM CHLORIDE 1000 ML: 9 INJECTION, SOLUTION INTRAVENOUS at 09:39

## 2021-04-27 RX ADMIN — Medication 5 ML: at 10:48

## 2021-04-27 ASSESSMENT — PAIN SCALES - GENERAL: PAINLEVEL: NO PAIN (0)

## 2021-04-27 ASSESSMENT — MIFFLIN-ST. JEOR: SCORE: 1161.57

## 2021-04-27 NOTE — PROGRESS NOTES
Michaela has rapid rise in her creatinine from 0.9 last week to 1.9 this week despite using cisplatin at 35 mg/m  dose. I will hold chemotherapy at this time. I will check for UA, FeNa, Urine protein. I will refer her to nephrology.     We will have to change her to carboplatin based regimen unless we determine that her renal insufficiency was unrelated to her cisplatin and completely recovers.     Flaco Cuellar    Hematologist and Medical Oncologist  [facility]

## 2021-04-27 NOTE — PROGRESS NOTES
Infusion Nursing Note:  Michaela Dorman presents today for C1D8 Gemzar/Cisplatin.    Patient seen by provider today: No   present during visit today: Not Applicable.    Note: Patient stated she was very constipated last week from 4/19-2/25.No BM.  Took ex-lax then had Diarrhea all day Sunday thru the night and Monday.     Patient  Did meet criteria for an asymptomatic covid-19 PCR test in infusion today. Patient  declined the covid-19 test. Patient has received both Covid vaccines.    Intravenous Access:  Labs drawn without difficulty.  Implanted Port.    Treatment Conditions:  Lab Results   Component Value Date    HGB 10.4 04/27/2021     Lab Results   Component Value Date    WBC 4.1 04/27/2021      Lab Results   Component Value Date    ANEU 1.9 04/27/2021     Lab Results   Component Value Date     04/27/2021      Lab Results   Component Value Date     04/27/2021                   Lab Results   Component Value Date    POTASSIUM 4.5 04/27/2021           Lab Results   Component Value Date    MAG 2.0 04/27/2021            Lab Results   Component Value Date    CR 1.90 04/27/2021                   Lab Results   Component Value Date    VANDANA 9.2 04/27/2021                Lab Results   Component Value Date    BILITOTAL 0.2 04/27/2021           Lab Results   Component Value Date    ALBUMIN 3.8 04/27/2021                    Lab Results   Component Value Date    ALT 55 04/27/2021           Lab Results   Component Value Date    AST 22 04/27/2021       Results reviewed, labs did NOT meet treatment parameters: Creat.  Talked with DR. Cuellar- Bambi chemo today. Give 1L NS IVF and check urine prior to start of fluids. Patient will stop at lab for 24 hour urine test container.       Post Infusion Assessment:  Patient tolerated infusion without incident.  Blood return noted pre and post infusion.  Access discontinued per protocol.  Return in 1 week..       Discharge Plan:   Discharge instructions reviewed with:  Patient.  Patient and/or family verbalized understanding of discharge instructions and all questions answered.  Patient discharged in stable condition accompanied by: self.  Departure Mode: Ambulatory.    Tami Knowles RN

## 2021-04-28 NOTE — TELEPHONE ENCOUNTER
RECORDS RECEIVED FROM: Internal   DATE RECEIVED: 06.28.2021   NOTES STATUS DETAILS   OFFICE NOTE from referring provider Internal 04.27.2021 Flaco Cuellar MD   OFFICE NOTE from other specialist  Internal 02.08.2021 Akiko Smith MD   *Only VASCULITIS or LUPUS gather office notes for the following N/A    *PULMONARY   N/A    *ENT N/A    *DERMATOLOGY N/A    *RHEUMATOLOGY N/A    DISCHARGE SUMMARY from hospital N/A    DISCHARGE REPORT from the ER N/A    MEDICATION LIST Internal / CE    IMAGING  (NEED IMAGES AND REPORTS)     KIDNEY CT SCAN Internal 02.22.2021 CT ABDOMEN AND PELVIS WITHOUT CONTRAST     KIDNEY ULTRASOUND N/A    MR ABDOMEN N/A    NUCLEAR MEDICINE RENAL N/A    LABS     CBC Internal 04.27.2021   CMP Internal 04.27.2021   BMP Internal 10.08.2020   UA Internal 04.27.2021   URINE PROTEIN Internal 04.27.2021 02.08.2021   RENAL PANEL Internal 02.08.2021   BIOPSY     KIDNEY BIOPSY  N/A

## 2021-04-28 NOTE — TELEPHONE ENCOUNTER
Notified patient via [employer] message that nephrology department is very full at this time. Told patient we would notify Dr. Salter she wants to be seen sooner but may not happen.

## 2021-04-29 ENCOUNTER — TELEPHONE (OUTPATIENT)
Dept: NEPHROLOGY | Facility: CLINIC | Age: 71
End: 2021-04-29

## 2021-04-29 ENCOUNTER — TELEPHONE (OUTPATIENT)
Dept: ONCOLOGY | Facility: CLINIC | Age: 71
End: 2021-04-29

## 2021-04-29 DIAGNOSIS — N18.31 STAGE 3A CHRONIC KIDNEY DISEASE (H): ICD-10-CM

## 2021-04-29 DIAGNOSIS — N17.9 ACUTE RENAL FAILURE, UNSPECIFIED ACUTE RENAL FAILURE TYPE (H): ICD-10-CM

## 2021-04-29 DIAGNOSIS — C67.9 UROTHELIAL CARCINOMA OF BLADDER WITH INVASION OF MUSCLE (H): ICD-10-CM

## 2021-04-29 DIAGNOSIS — N18.31 STAGE 3A CHRONIC KIDNEY DISEASE (H): Primary | ICD-10-CM

## 2021-04-29 DIAGNOSIS — Z51.11 ENCOUNTER FOR ANTINEOPLASTIC CHEMOTHERAPY: ICD-10-CM

## 2021-04-29 LAB
ALBUMIN SERPL-MCNC: 4.2 G/DL (ref 3.4–5)
ANION GAP SERPL CALCULATED.3IONS-SCNC: 5 MMOL/L (ref 3–14)
BUN SERPL-MCNC: 37 MG/DL (ref 7–30)
CALCIUM SERPL-MCNC: 9.6 MG/DL (ref 8.5–10.1)
CHLORIDE SERPL-SCNC: 106 MMOL/L (ref 94–109)
CO2 SERPL-SCNC: 26 MMOL/L (ref 20–32)
COLLECT DURATION TIME UR: 22 H
CREAT 24H UR-MRATE: 0.86 G/(24.H) (ref 0.8–1.8)
CREAT SERPL-MCNC: 1.89 MG/DL (ref 0.52–1.04)
CREAT UR-MCNC: 26 MG/DL
GFR SERPL CREATININE-BSD FRML MDRD: 26 ML/MIN/{1.73_M2}
GLUCOSE SERPL-MCNC: 79 MG/DL (ref 70–99)
PHOSPHATE SERPL-MCNC: 3.7 MG/DL (ref 2.5–4.5)
POTASSIUM SERPL-SCNC: 4.5 MMOL/L (ref 3.4–5.3)
PROT 24H UR-MRATE: 0.29 G/(24.H) (ref 0.04–0.23)
PROT UR-MCNC: 0.09 G/L
PROT/CREAT 24H UR: 0.34 G/G CR (ref 0–0.2)
SODIUM SERPL-SCNC: 137 MMOL/L (ref 133–144)
SPECIMEN VOL UR: 3000 ML

## 2021-04-29 PROCEDURE — 80069 RENAL FUNCTION PANEL: CPT | Performed by: INTERNAL MEDICINE

## 2021-04-29 PROCEDURE — 84156 ASSAY OF PROTEIN URINE: CPT | Performed by: INTERNAL MEDICINE

## 2021-04-29 PROCEDURE — 36415 COLL VENOUS BLD VENIPUNCTURE: CPT | Performed by: INTERNAL MEDICINE

## 2021-04-29 PROCEDURE — 81050 URINALYSIS VOLUME MEASURE: CPT | Performed by: INTERNAL MEDICINE

## 2021-04-29 NOTE — TELEPHONE ENCOUNTER
"Spoke to patient. She reports that she stopped taking the Celebrex \"a couple of days ago.\" She does admit that she had a bout of really bad diarrhea after taking ex lax due to being constipated. She reports that the diarrhea was constant for 1.5 days. She has been thirsty. She has been drinking a lot and filled the 24 hour urine collection jug prior to 24 hours. She has been eating and drinking well as of recent. She did report that her appetite was a bit worse after chemo treatment though compazine is helping.     Plan: patient will continue to hydrate and have lab work done on 4/30 at [facility]. Scheduled lab and follow up for May 17th with Dr. Smith. Will continue to follow lab work and work with Dr. Cuellar care team prior to follow up.     Radha Pinto, RN, BSN  Nephrology Care Coordinator  [facility]    "

## 2021-04-29 NOTE — TELEPHONE ENCOUNTER
Patient called [facility] today with questions related to referral to Nephrologist.  Patient has appointment scheduled with Dr Salter 6/28/21.  Patient is unhappy with waiting that long and has concerns with need to see nephrology and relation to starting chemotherapy. Patient is requesting a return call for clarification.

## 2021-04-29 NOTE — TELEPHONE ENCOUNTER
Charting conversation in a separate duplicate encounter.    Radha Pinto, RN, BSN  Nephrology Care Coordinator  [facility]

## 2021-04-29 NOTE — TELEPHONE ENCOUNTER
[facility]    Phone Message    May a detailed message be left on voicemail: yes     Reason for Call: Patient being referred by Dr. Cuellar to Nephrology. Patient is already established with Dr. Smith. Per Pt, she was recently diagnosis with bladder cancer and not sure why she needs to be seen in Nephrology for this. The referral is for Acute Kidney Injury/Decreased eGFR. Patient mentioned that she feels it's urgent that she get this figured out, because Dr. Cuellar mentioned just going head with surgery. Patient is very concerned and would like a call from the clinic to discuss. Sending TE message for clinic review and follow-up with patient.     NOTE: I did offer to schedule with Dr. Smith, but the soonest appointment is in June.    Action Taken: Message routed to:  [facility]  Nephrology    Travel Screening: Not Applicable

## 2021-04-29 NOTE — TELEPHONE ENCOUNTER
Received the following message from Dr. Smith regarding worsening creatinine.     1. She needs to stop celebrex as we discussed at her last visit if she hasn't already done that   2. It sounds like she had received cisplatin the week prior half dose and also had diarrhea before this lab test. The last test looks like she may have been dry. If this is a possibility, I recommend getting her a liter of fluid and rechecking a renal panel after. If she feels she can push fluids at home, that would be the other option. Repeat labs again tomorrow ideally to make sure we are trending the right direction. If the creatinine is worse, fluids would be something I would consider so you may jsut want to set up the fluids to be safe.   3. Can discontinue appt with Gaetano in June as I have been following her. Attaching Dr. Cuellar to this message as a FYI That we are following.     Left message for patient to return call to discuss the above information.     Radha Pinto, RN, BSN  Nephrology Care Coordinator  [facility]

## 2021-04-29 NOTE — TELEPHONE ENCOUNTER
Received message from Sherrill Crooks, procedure  that Dr. Cuellar is requesting a sooner appointment with nephrology. Patient's creatinine has recently worsened on labs dated 4/27.  Results for NICHOLAS DONNELLY (MRN [id number]) as of 4/29/2021 09:54   Ref. Range 4/20/2021 08:05 4/27/2021 08:11   Creatinine Latest Ref Range: 0.52 - 1.04 mg/dL 0.90 1.90 (H)   GFR Estimate Latest Ref Range: >60 mL/min/1.73_m2 64 26 (L)     Routing to Dr. Vásquez as there is currently no urgent clinic visits available at this time.    Radha Pinto, RN, BSN  Nephrology Care Coordinator  [facility]

## 2021-04-30 ENCOUNTER — MYC MEDICAL ADVICE (OUTPATIENT)
Dept: NEPHROLOGY | Facility: CLINIC | Age: 71
End: 2021-04-30

## 2021-04-30 DIAGNOSIS — N18.31 STAGE 3A CHRONIC KIDNEY DISEASE (H): Primary | ICD-10-CM

## 2021-05-03 ENCOUNTER — MYC MEDICAL ADVICE (OUTPATIENT)
Dept: ONCOLOGY | Facility: CLINIC | Age: 71
End: 2021-05-03

## 2021-05-03 ENCOUNTER — OFFICE VISIT (OUTPATIENT)
Dept: NEPHROLOGY | Facility: CLINIC | Age: 71
End: 2021-05-03
Payer: COMMERCIAL

## 2021-05-03 VITALS
OXYGEN SATURATION: 98 % | SYSTOLIC BLOOD PRESSURE: 162 MMHG | DIASTOLIC BLOOD PRESSURE: 96 MMHG | HEART RATE: 74 BPM | WEIGHT: 143 LBS | BODY MASS INDEX: 22.74 KG/M2

## 2021-05-03 DIAGNOSIS — C67.9 UROTHELIAL CARCINOMA OF BLADDER WITH INVASION OF MUSCLE (H): Primary | ICD-10-CM

## 2021-05-03 DIAGNOSIS — N17.9 ACUTE KIDNEY INJURY (H): Primary | ICD-10-CM

## 2021-05-03 DIAGNOSIS — N18.31 STAGE 3A CHRONIC KIDNEY DISEASE (H): ICD-10-CM

## 2021-05-03 DIAGNOSIS — Z51.11 ENCOUNTER FOR ANTINEOPLASTIC CHEMOTHERAPY: ICD-10-CM

## 2021-05-03 DIAGNOSIS — C50.412 MALIGNANT NEOPLASM OF UPPER-OUTER QUADRANT OF LEFT FEMALE BREAST, UNSPECIFIED ESTROGEN RECEPTOR STATUS (H): ICD-10-CM

## 2021-05-03 LAB
ALBUMIN SERPL-MCNC: 4 G/DL (ref 3.4–5)
ANION GAP SERPL CALCULATED.3IONS-SCNC: 3 MMOL/L (ref 3–14)
BUN SERPL-MCNC: 41 MG/DL (ref 7–30)
CALCIUM SERPL-MCNC: 9 MG/DL (ref 8.5–10.1)
CHLORIDE SERPL-SCNC: 105 MMOL/L (ref 94–109)
CO2 SERPL-SCNC: 27 MMOL/L (ref 20–32)
CREAT SERPL-MCNC: 1.61 MG/DL (ref 0.52–1.04)
GFR SERPL CREATININE-BSD FRML MDRD: 32 ML/MIN/{1.73_M2}
GLUCOSE SERPL-MCNC: 88 MG/DL (ref 70–99)
PHOSPHATE SERPL-MCNC: 3 MG/DL (ref 2.5–4.5)
POTASSIUM SERPL-SCNC: 4.9 MMOL/L (ref 3.4–5.3)
SODIUM SERPL-SCNC: 135 MMOL/L (ref 133–144)

## 2021-05-03 PROCEDURE — 36415 COLL VENOUS BLD VENIPUNCTURE: CPT | Performed by: INTERNAL MEDICINE

## 2021-05-03 PROCEDURE — 80069 RENAL FUNCTION PANEL: CPT | Performed by: INTERNAL MEDICINE

## 2021-05-03 PROCEDURE — 99215 OFFICE O/P EST HI 40 MIN: CPT | Performed by: INTERNAL MEDICINE

## 2021-05-03 PROCEDURE — 99417 PROLNG OP E/M EACH 15 MIN: CPT | Performed by: INTERNAL MEDICINE

## 2021-05-03 RX ORDER — MEPERIDINE HYDROCHLORIDE 25 MG/ML
25 INJECTION INTRAMUSCULAR; INTRAVENOUS; SUBCUTANEOUS EVERY 30 MIN PRN
Status: CANCELLED | OUTPATIENT
Start: 2021-05-04

## 2021-05-03 RX ORDER — SODIUM CHLORIDE 9 MG/ML
1000 INJECTION, SOLUTION INTRAVENOUS CONTINUOUS PRN
Status: CANCELLED
Start: 2021-05-12

## 2021-05-03 RX ORDER — HEPARIN SODIUM,PORCINE 10 UNIT/ML
5 VIAL (ML) INTRAVENOUS
Status: CANCELLED | OUTPATIENT
Start: 2021-05-12

## 2021-05-03 RX ORDER — SODIUM CHLORIDE 9 MG/ML
1000 INJECTION, SOLUTION INTRAVENOUS CONTINUOUS PRN
Status: CANCELLED
Start: 2021-05-04

## 2021-05-03 RX ORDER — ALBUTEROL SULFATE 90 UG/1
1-2 AEROSOL, METERED RESPIRATORY (INHALATION)
Status: CANCELLED
Start: 2021-05-04

## 2021-05-03 RX ORDER — HEPARIN SODIUM (PORCINE) LOCK FLUSH IV SOLN 100 UNIT/ML 100 UNIT/ML
5 SOLUTION INTRAVENOUS
Status: CANCELLED | OUTPATIENT
Start: 2021-05-04

## 2021-05-03 RX ORDER — HEPARIN SODIUM (PORCINE) LOCK FLUSH IV SOLN 100 UNIT/ML 100 UNIT/ML
5 SOLUTION INTRAVENOUS
Status: CANCELLED | OUTPATIENT
Start: 2021-05-12

## 2021-05-03 RX ORDER — HEPARIN SODIUM,PORCINE 10 UNIT/ML
5 VIAL (ML) INTRAVENOUS
Status: CANCELLED | OUTPATIENT
Start: 2021-05-04

## 2021-05-03 RX ORDER — NALOXONE HYDROCHLORIDE 0.4 MG/ML
.1-.4 INJECTION, SOLUTION INTRAMUSCULAR; INTRAVENOUS; SUBCUTANEOUS
Status: CANCELLED | OUTPATIENT
Start: 2021-05-12

## 2021-05-03 RX ORDER — PALONOSETRON 0.05 MG/ML
0.25 INJECTION, SOLUTION INTRAVENOUS ONCE
Status: CANCELLED
Start: 2021-05-12

## 2021-05-03 RX ORDER — ALBUTEROL SULFATE 90 UG/1
1-2 AEROSOL, METERED RESPIRATORY (INHALATION)
Status: CANCELLED
Start: 2021-05-12

## 2021-05-03 RX ORDER — MEPERIDINE HYDROCHLORIDE 25 MG/ML
25 INJECTION INTRAMUSCULAR; INTRAVENOUS; SUBCUTANEOUS EVERY 30 MIN PRN
Status: CANCELLED | OUTPATIENT
Start: 2021-05-12

## 2021-05-03 RX ORDER — ALBUTEROL SULFATE 0.83 MG/ML
2.5 SOLUTION RESPIRATORY (INHALATION)
Status: CANCELLED | OUTPATIENT
Start: 2021-05-12

## 2021-05-03 RX ORDER — LORAZEPAM 2 MG/ML
0.5 INJECTION INTRAMUSCULAR EVERY 4 HOURS PRN
Status: CANCELLED
Start: 2021-05-04

## 2021-05-03 RX ORDER — LORAZEPAM 2 MG/ML
0.5 INJECTION INTRAMUSCULAR EVERY 4 HOURS PRN
Status: CANCELLED
Start: 2021-05-12

## 2021-05-03 RX ORDER — NALOXONE HYDROCHLORIDE 0.4 MG/ML
.1-.4 INJECTION, SOLUTION INTRAMUSCULAR; INTRAVENOUS; SUBCUTANEOUS
Status: CANCELLED | OUTPATIENT
Start: 2021-05-04

## 2021-05-03 RX ORDER — PALONOSETRON 0.05 MG/ML
0.25 INJECTION, SOLUTION INTRAVENOUS ONCE
Status: CANCELLED
Start: 2021-05-04

## 2021-05-03 RX ORDER — DIPHENHYDRAMINE HYDROCHLORIDE 50 MG/ML
50 INJECTION INTRAMUSCULAR; INTRAVENOUS
Status: CANCELLED
Start: 2021-05-04

## 2021-05-03 RX ORDER — DIPHENHYDRAMINE HYDROCHLORIDE 50 MG/ML
50 INJECTION INTRAMUSCULAR; INTRAVENOUS
Status: CANCELLED
Start: 2021-05-12

## 2021-05-03 RX ORDER — EPINEPHRINE 1 MG/ML
0.3 INJECTION, SOLUTION, CONCENTRATE INTRAVENOUS EVERY 5 MIN PRN
Status: CANCELLED | OUTPATIENT
Start: 2021-05-12

## 2021-05-03 RX ORDER — ALBUTEROL SULFATE 0.83 MG/ML
2.5 SOLUTION RESPIRATORY (INHALATION)
Status: CANCELLED | OUTPATIENT
Start: 2021-05-04

## 2021-05-03 RX ORDER — EPINEPHRINE 1 MG/ML
0.3 INJECTION, SOLUTION, CONCENTRATE INTRAVENOUS EVERY 5 MIN PRN
Status: CANCELLED | OUTPATIENT
Start: 2021-05-04

## 2021-05-03 ASSESSMENT — PAIN SCALES - GENERAL: PAINLEVEL: MILD PAIN (2)

## 2021-05-03 NOTE — PROGRESS NOTES
5/3/21    CC: elevated creatinine    HPI: Michaela Dorman is a 70 year old  female who presents for evaluation of elevated creatinine.  Jan 5, 2021:  Ms. Jean has had difficulties with recurrent UTIs- on/off for years.  - referred to urology as well - started on bactrim single strength for prophylaxis in late November. Plan in November was to follow-up with Dr. Shaw in urology again and at that time will undergo cystoscopy as well. On review of her creatinine levels, her true baseline seems to be around 0.8 but with episodes of elevation (up to 1.2 in 2012, 1.45 in May 2019, more recently sitting 1.1-1.2 this year). On review of UAs, she has had hematuria; UMaCR typically normal but 40 mg/g in Dec 2020.    Today she reports that she has stopped the bactrim due to ongoing infections despite being on it; off for one week. She is now on ciprofloxacin; also recent difficulties with yeast infections. BP most recently has been low. She feels weak/dizzy with these low pressures - by afternoon is able to walk 3 miles. She is off of hydrochlorothiazide now; 162/75 yesterday later in the day so did take a tab. She is eating/drinking well. No fever/chills. She feels the UTI is gone at this time. No diarrhea. No irregular heart rate appreciated. She reports low blood pressures for years on/off - now 3-4 months with very low pressures.    She is taking celebrex for back pain - had an extensive back surgery in early 2020. Currently taking celebrex once a day. Prior to 2020 was not taking NSAIDs. On prevacid for heart burn - has been on this for long time - no heart burn sxs for a long time. No hx of ulcers/esophagitis. Takes miralax daily. Takes magnesium daily. Addt hx breast cancer with mastectomy/chemo/rx.   - History of Hematuria: no  - Swelling: no  - Hx of UTIs: yes - see above  - Hx of stones: no  - Rashes/Joint pain: no rash; back pain present - hx of surgery  - Family hx of kidney disease: no  - NSAID use: no other  NSAIDs; just celebrex    02/08/21: video visit. At the time of her initial visit, I recommended stopping celebrex, trial stopping prevacid, staying off of hydrochlorothiazide. Creatinine was 1.2 just after that visit, improved to 0.96 on 1/21 and is now 1.12 most recently on 2/3/21. She unfortunately has had ongoing difficulties with urinary frequency, urgency, and dysuria. Bactrim was sent in 2/4/21. She has a cystoscopy planned with  on 2/22/21 with a CT at that time as well. Her cultures have only shown mixed urogenital cher most recently.    Today she reports that she remains on celebrex - was on BID and now daily. She is following with Phani Tapia PA-C for her pain. BP has been good - at least 90/60, 100/62. It is this low despite being off of all BP meds. Heart rate has been 95. She is walking and exercising quite a bit: walking 3 miles per day and elliptical. MWF she also does weights. She feels she is drinking a lot of water/staying hydrated. Early AM dizziness. She tried stopping prevacid after last visit but she had heart burn sxs return - pepcid did not give the relief.      5/3/21: since last visit she was seen by urology and dx with high grade urothelial cancer. She underwent transurethral resection of the bladder tumor in March and was started on cisplatin on 4/20/21. She reports that she was on AZO for all of October and on it up until 3/18. She has an TAMIR now - reports diarrhea started Friday but stopped Sunday afternoon. She admits she has been taking celebrex up until 4/29. She reports that she is no longer with diarrhea and has had good hydration over the weekend.        Allergies   Allergen Reactions     Oxybutynin      Patient reports symptoms of nausea and mind fogginess       [START ON 6/4/2021] acetaminophen (TYLENOL) tablet 650 mg  acetaminophen (TYLENOL) tablet 975 mg  ALPRAZolam (XANAX) tablet 0.5 mg  [START ON 6/2/2021] atorvastatin (LIPITOR) tablet 20 mg  benzocaine-menthol  (CEPACOL) 15-3.6 MG lozenge 1 lozenge  benzocaine-menthol (CEPACOL) 15-3.6 MG lozenge 1 lozenge  bisacodyl (DULCOLAX) Suppository 10 mg  calcium carbonate (TUMS) chewable tablet 500 mg  docusate sodium (COLACE) capsule 100 mg  [COMPLETED] ertapenem (INVanz) 1 g vial to attach to  mL bag  [START ON 6/2/2021] escitalopram (LEXAPRO) tablet 30 mg  famotidine (PEPCID) tablet 20 mg    Or  famotidine (PEPCID) infusion 20 mg  [COMPLETED] gabapentin (NEURONTIN) capsule 300 mg  [COMPLETED] heparin ANTICOAGULANT injection 5,000 Units  [START ON 6/2/2021] heparin ANTICOAGULANT injection 5,000 Units  HYDROmorphone (PF) (DILAUDID) 0.5 MG/0.5 ML injection  HYDROmorphone (PF) (DILAUDID) injection 0.3-0.5 mg  hydrOXYzine (ATARAX) tablet 10 mg  lidocaine (LMX4) cream  lidocaine 1 % 0.1-1 mL  magnesium hydroxide (MILK OF MAGNESIA) suspension 30 mL  meclizine (ANTIVERT) tablet 25 mg  [START ON 6/2/2021] metoprolol (LOPRESSOR) injection 5 mg  naloxone (NARCAN) injection 0.2 mg    Or  naloxone (NARCAN) injection 0.4 mg    Or  naloxone (NARCAN) injection 0.2 mg    Or  naloxone (NARCAN) injection 0.4 mg  ondansetron (ZOFRAN-ODT) ODT tab 4 mg    Or  ondansetron (ZOFRAN) injection 4 mg  oxyCODONE (ROXICODONE) tablet 5-10 mg  [START ON 6/2/2021] polyethylene glycol (MIRALAX) Packet 17 g  prochlorperazine (COMPAZINE) injection 5 mg    Or  prochlorperazine (COMPAZINE) tablet 5 mg  senna-docusate (SENOKOT-S/PERICOLACE) 8.6-50 MG per tablet 1 tablet  sodium chloride (PF) 0.9% PF flush 3 mL  sodium chloride (PF) 0.9% PF flush 3 mL  sodium chloride 0.9% infusion  sodium phosphate (FLEET ENEMA) 1 enema    acetaminophen (TYLENOL) 500 MG tablet, Take 1,000 mg by mouth 3 times daily as needed for mild pain (500MG X 2 = 1,000MG)  ALPRAZolam (XANAX) 0.5 MG tablet, TAKE 1 TABLET BY MOUTH DAILY AT BEDTIME AS NEEDED FOR SLEEP  aspirin (ASA) 81 MG chewable tablet, Take 1 tablet (81 mg) by mouth daily  atorvastatin (LIPITOR) 20 MG tablet, Take 1 tablet  (20 mg) by mouth daily  escitalopram (LEXAPRO) 10 MG tablet, TAKE 1 TABLET BY MOUTH EVERY DAY WITH 20MG TABLETS (Patient taking differently: Take 10 mg by mouth daily Takes with 20 mg tablet for total daily dose of 30 mg in the morning.)  LORazepam (ATIVAN) 0.5 MG tablet, Take 1 tablet (0.5 mg) by mouth every 4 hours as needed (Anxiety, Nausea/Vomiting or Sleep)  omeprazole (PRILOSEC) 20 MG DR capsule, Take 20 mg by mouth daily  oxyCODONE-acetaminophen (PERCOCET) 5-325 MG tablet, Take 0.5-1 tablets by mouth every 6 hours as needed for severe pain   polyethylene glycol (MIRALAX/GLYCOLAX) powder, Take 17 g by mouth every morning   prochlorperazine (COMPAZINE) 10 MG tablet, Take 0.5 tablets (5 mg) by mouth every 6 hours as needed (Nausea/Vomiting)  senna-docusate (SENOKOT-S/PERICOLACE) 8.6-50 MG tablet, Take 2 tablets by mouth 2 times daily (Patient taking differently: Take 2 tablets by mouth 2 times daily as needed for constipation )  meclizine 25 MG CHEW, Take 25 mg by mouth every 6 hours as needed for dizziness      Exam:  BP (!) 162/96 (BP Location: Right arm, Patient Position: Standing, Cuff Size: Adult Regular)   Pulse 74   Wt 64.9 kg (143 lb)   SpO2 98%   BMI 22.74 kg/m    GENERAL: Healthy, alert and no distress      Results:   Orders Only on 05/03/2021   Component Date Value Ref Range Status     Sodium 05/03/2021 135  133 - 144 mmol/L Final     Potassium 05/03/2021 4.9  3.4 - 5.3 mmol/L Final     Chloride 05/03/2021 105  94 - 109 mmol/L Final     Carbon Dioxide 05/03/2021 27  20 - 32 mmol/L Final     Anion Gap 05/03/2021 3  3 - 14 mmol/L Final     Glucose 05/03/2021 88  70 - 99 mg/dL Final     Urea Nitrogen 05/03/2021 41* 7 - 30 mg/dL Final     Creatinine 05/03/2021 1.61* 0.52 - 1.04 mg/dL Final     GFR Estimate 05/03/2021 32* >60 mL/min/[1.73_m2] Final    Comment: Non  GFR Calc  Starting 12/18/2018, serum creatinine based estimated GFR (eGFR) will be   calculated using the Chronic Kidney  Disease Epidemiology Collaboration   (CKD-EPI) equation.       GFR Estimate If Black 05/03/2021 37* >60 mL/min/[1.73_m2] Final    Comment:  GFR Calc  Starting 12/18/2018, serum creatinine based estimated GFR (eGFR) will be   calculated using the Chronic Kidney Disease Epidemiology Collaboration   (CKD-EPI) equation.       Calcium 05/03/2021 9.0  8.5 - 10.1 mg/dL Final     Phosphorus 05/03/2021 3.0  2.5 - 4.5 mg/dL Final     Albumin 05/03/2021 4.0  3.4 - 5.0 g/dL Final       Assessment/Plan:   1. CKD STage 3: at risk for CKD in the setting of hypotension, NSAID use, PPI use, and recurrent UTIs.  UMACR normal in January so reassuring that there is unlikely an active GN.   - Now with TAMIR in the setting of recent cisplatin use, diarrhea, while on celebrex. Discussed with Dr. Cuellar and the plan is to move to carboplatin. Educated Michaela to avoid all NSAIDs including celebrex.     2. Hypotension: resolved    3. Low back pain:stressed the importance of avoiding NSAIDs.     4. Anemia: iron saturation was at 29%. Given GI irritability, ok with her taking the iron every other day. Hemoglobin is 9.6       72 total minutes spent in chart: lab entry, chart review, documentation, visit time  Akiko Smith DO

## 2021-05-03 NOTE — NURSING NOTE
Michaela Dorman's goals for this visit include:   Chief Complaint   Patient presents with     RECHECK     3mo recheck CKD       She requests these members of her care team be copied on today's visit information: no    PCP: Phani Tapia    Referring Provider:  No referring provider defined for this encounter.    BP (!) 176/104 (BP Location: Right arm, Patient Position: Sitting, Cuff Size: Adult Regular)   Pulse 74   Wt 64.9 kg (143 lb)   SpO2 98%   BMI 22.74 kg/m      Do you need any medication refills at today's visit? No    Danika Scott LPN

## 2021-05-04 ENCOUNTER — MYC MEDICAL ADVICE (OUTPATIENT)
Dept: ONCOLOGY | Facility: CLINIC | Age: 71
End: 2021-05-04

## 2021-05-04 ENCOUNTER — INFUSION THERAPY VISIT (OUTPATIENT)
Dept: INFUSION THERAPY | Facility: CLINIC | Age: 71
End: 2021-05-04
Attending: INTERNAL MEDICINE
Payer: MEDICARE

## 2021-05-04 ENCOUNTER — MYC MEDICAL ADVICE (OUTPATIENT)
Dept: NEPHROLOGY | Facility: CLINIC | Age: 71
End: 2021-05-04

## 2021-05-04 ENCOUNTER — TELEPHONE (OUTPATIENT)
Dept: ONCOLOGY | Facility: CLINIC | Age: 71
End: 2021-05-04

## 2021-05-04 VITALS
WEIGHT: 141.9 LBS | OXYGEN SATURATION: 96 % | DIASTOLIC BLOOD PRESSURE: 81 MMHG | TEMPERATURE: 98.2 F | HEART RATE: 77 BPM | RESPIRATION RATE: 18 BRPM | SYSTOLIC BLOOD PRESSURE: 156 MMHG | BODY MASS INDEX: 22.56 KG/M2

## 2021-05-04 DIAGNOSIS — F43.21 ADJUSTMENT DISORDER WITH DEPRESSED MOOD: ICD-10-CM

## 2021-05-04 DIAGNOSIS — I10 HYPERTENSION GOAL BP (BLOOD PRESSURE) < 140/90: ICD-10-CM

## 2021-05-04 DIAGNOSIS — F41.9 ANXIETY: ICD-10-CM

## 2021-05-04 DIAGNOSIS — C67.9 UROTHELIAL CARCINOMA OF BLADDER WITH INVASION OF MUSCLE (H): Primary | ICD-10-CM

## 2021-05-04 DIAGNOSIS — N18.31 STAGE 3A CHRONIC KIDNEY DISEASE (H): Primary | ICD-10-CM

## 2021-05-04 DIAGNOSIS — C50.412 MALIGNANT NEOPLASM OF UPPER-OUTER QUADRANT OF LEFT FEMALE BREAST, UNSPECIFIED ESTROGEN RECEPTOR STATUS (H): ICD-10-CM

## 2021-05-04 DIAGNOSIS — Z51.11 ENCOUNTER FOR ANTINEOPLASTIC CHEMOTHERAPY: ICD-10-CM

## 2021-05-04 LAB
ALBUMIN SERPL-MCNC: 3.8 G/DL (ref 3.4–5)
ALP SERPL-CCNC: 46 U/L (ref 40–150)
ALT SERPL W P-5'-P-CCNC: 30 U/L (ref 0–50)
ANION GAP SERPL CALCULATED.3IONS-SCNC: 3 MMOL/L (ref 3–14)
AST SERPL W P-5'-P-CCNC: 16 U/L (ref 0–45)
BASOPHILS # BLD AUTO: 0 10E9/L (ref 0–0.2)
BASOPHILS NFR BLD AUTO: 0.5 %
BILIRUB SERPL-MCNC: 0.2 MG/DL (ref 0.2–1.3)
BUN SERPL-MCNC: 37 MG/DL (ref 7–30)
CALCIUM SERPL-MCNC: 8.8 MG/DL (ref 8.5–10.1)
CHLORIDE SERPL-SCNC: 111 MMOL/L (ref 94–109)
CO2 SERPL-SCNC: 27 MMOL/L (ref 20–32)
CREAT SERPL-MCNC: 1.41 MG/DL (ref 0.52–1.04)
DIFFERENTIAL METHOD BLD: ABNORMAL
EOSINOPHIL # BLD AUTO: 0.3 10E9/L (ref 0–0.7)
EOSINOPHIL NFR BLD AUTO: 5.1 %
ERYTHROCYTE [DISTWIDTH] IN BLOOD BY AUTOMATED COUNT: 13 % (ref 10–15)
GFR SERPL CREATININE-BSD FRML MDRD: 37 ML/MIN/{1.73_M2}
GLUCOSE SERPL-MCNC: 85 MG/DL (ref 70–99)
HCT VFR BLD AUTO: 31.1 % (ref 35–47)
HGB BLD-MCNC: 9.6 G/DL (ref 11.7–15.7)
IMM GRANULOCYTES # BLD: 0 10E9/L (ref 0–0.4)
IMM GRANULOCYTES NFR BLD: 0.4 %
LYMPHOCYTES # BLD AUTO: 1.4 10E9/L (ref 0.8–5.3)
LYMPHOCYTES NFR BLD AUTO: 24.8 %
MAGNESIUM SERPL-MCNC: 1.8 MG/DL (ref 1.6–2.3)
MCH RBC QN AUTO: 33.6 PG (ref 26.5–33)
MCHC RBC AUTO-ENTMCNC: 30.9 G/DL (ref 31.5–36.5)
MCV RBC AUTO: 109 FL (ref 78–100)
MONOCYTES # BLD AUTO: 0.5 10E9/L (ref 0–1.3)
MONOCYTES NFR BLD AUTO: 9.3 %
NEUTROPHILS # BLD AUTO: 3.4 10E9/L (ref 1.6–8.3)
NEUTROPHILS NFR BLD AUTO: 59.9 %
NRBC # BLD AUTO: 0 10*3/UL
NRBC BLD AUTO-RTO: 0 /100
PLATELET # BLD AUTO: 236 10E9/L (ref 150–450)
POTASSIUM SERPL-SCNC: 4.3 MMOL/L (ref 3.4–5.3)
PROT SERPL-MCNC: 6.7 G/DL (ref 6.8–8.8)
RBC # BLD AUTO: 2.86 10E12/L (ref 3.8–5.2)
SODIUM SERPL-SCNC: 141 MMOL/L (ref 133–144)
WBC # BLD AUTO: 5.7 10E9/L (ref 4–11)

## 2021-05-04 PROCEDURE — 258N000003 HC RX IP 258 OP 636: Performed by: INTERNAL MEDICINE

## 2021-05-04 PROCEDURE — 80053 COMPREHEN METABOLIC PANEL: CPT | Performed by: INTERNAL MEDICINE

## 2021-05-04 PROCEDURE — 96417 CHEMO IV INFUS EACH ADDL SEQ: CPT

## 2021-05-04 PROCEDURE — 250N000011 HC RX IP 250 OP 636: Performed by: INTERNAL MEDICINE

## 2021-05-04 PROCEDURE — 96375 TX/PRO/DX INJ NEW DRUG ADDON: CPT

## 2021-05-04 PROCEDURE — 83735 ASSAY OF MAGNESIUM: CPT | Performed by: INTERNAL MEDICINE

## 2021-05-04 PROCEDURE — 85025 COMPLETE CBC W/AUTO DIFF WBC: CPT | Performed by: INTERNAL MEDICINE

## 2021-05-04 PROCEDURE — 96367 TX/PROPH/DG ADDL SEQ IV INF: CPT

## 2021-05-04 PROCEDURE — 96361 HYDRATE IV INFUSION ADD-ON: CPT

## 2021-05-04 PROCEDURE — 96413 CHEMO IV INFUSION 1 HR: CPT

## 2021-05-04 RX ORDER — PALONOSETRON 0.05 MG/ML
0.25 INJECTION, SOLUTION INTRAVENOUS ONCE
Status: COMPLETED | OUTPATIENT
Start: 2021-05-04 | End: 2021-05-04

## 2021-05-04 RX ORDER — HEPARIN SODIUM (PORCINE) LOCK FLUSH IV SOLN 100 UNIT/ML 100 UNIT/ML
5 SOLUTION INTRAVENOUS
Status: DISCONTINUED | OUTPATIENT
Start: 2021-05-04 | End: 2021-05-04 | Stop reason: HOSPADM

## 2021-05-04 RX ADMIN — Medication 5 ML: at 11:19

## 2021-05-04 RX ADMIN — CARBOPLATIN 310 MG: 10 INJECTION, SOLUTION INTRAVENOUS at 09:53

## 2021-05-04 RX ADMIN — FOSAPREPITANT: 150 INJECTION, POWDER, LYOPHILIZED, FOR SOLUTION INTRAVENOUS at 09:13

## 2021-05-04 RX ADMIN — PALONOSETRON 0.25 MG: 0.05 INJECTION, SOLUTION INTRAVENOUS at 08:56

## 2021-05-04 RX ADMIN — GEMCITABINE 1600 MG: 38 INJECTION, SOLUTION INTRAVENOUS at 10:34

## 2021-05-04 RX ADMIN — SODIUM CHLORIDE 1000 ML: 9 INJECTION, SOLUTION INTRAVENOUS at 08:19

## 2021-05-04 NOTE — PROGRESS NOTES
Infusion Nursing Note:  Michaela Dorman presents today for C2D1 Carboplatin/Gemzar/IVF.    Patient seen by provider today: No   present during visit today: Not Applicable.    Note: Patient was deferred last week due to elevated Creatinine. Met with Nephrology and spoke with Dr. Cuellar. One of the chemotherapy drugs was changed on her plan. B/P 161/79 today, HR 76. Patient denies pain and neuropathy. Mild edema in ankles/feet. Nausea has been managed well with antiemetics. Appetite better the last few days.   Patient did meet criteria for an asymptomatic covid-19 PCR test in infusion today. Patient declined the covid-19 test.  Message sent to Care Coordinator, Leola BLEDSOE re: if patient should take the oral Dexamethasone at home as previously prescribed.   Carboplatin dosage verified on [email or web address]     Intravenous Access:  Lab draw site Right chest wall, Needle type noncoring, Gauge 20,3/4in.  Labs drawn without difficulty.  Implanted Port.    Treatment Conditions:  Lab Results   Component Value Date    HGB 9.6 05/04/2021     Lab Results   Component Value Date    WBC 5.7 05/04/2021      Lab Results   Component Value Date    ANEU 3.4 05/04/2021     Lab Results   Component Value Date     05/04/2021      Lab Results   Component Value Date     05/04/2021                   Lab Results   Component Value Date    POTASSIUM 4.3 05/04/2021           Lab Results   Component Value Date    MAG 2.0 04/27/2021            Lab Results   Component Value Date    CR PENDING 05/04/2021                   Lab Results   Component Value Date    VANDANA PENDING 05/04/2021                Lab Results   Component Value Date    BILITOTAL PENDING 05/04/2021           Lab Results   Component Value Date    ALBUMIN PENDING 05/04/2021                    Lab Results   Component Value Date    ALT PENDING 05/04/2021           Lab Results   Component Value Date    AST PENDING 05/04/2021       Results reviewed, labs MET treatment  parameters, ok to proceed with treatment.  Creat 1.41 , 1L NS given per orders.      Post Infusion Assessment:  Patient tolerated infusion without incident. VSS, asymptomatic.   Blood return noted pre and post infusion.  Site patent and intact, free from redness, edema or discomfort.  No evidence of extravasations.  Access discontinued per protocol.       Discharge Plan:   Copy of AVS reviewed with patient. Patient will return 5/18 for next appointment.  Patient discharged in stable condition accompanied by: self.  Departure Mode: Ambulatory.    Amy Renee RN

## 2021-05-04 NOTE — TELEPHONE ENCOUNTER
I called Michaela and spoke to her in person. The renal function is improving which is a great thing. To ensure that it continues to improve, we cannot give her cisplatin. With the extent of rise, I would be nervous about giving her cisplatin at all.     I have switched her chemotherapy plan to carboplatin with gemcitabine which would be better that any additional delays. I reviewed new plans with her. We will start with a new cycle and this would effectively be Cycle 2 day 1.     I have reached out to Dr. Robles to move up the surgery date. We will squeeze in as many chemotherapy until she gets a date for her surgery.     Flaco Cuellar    Hematologist and Medical Oncologist  [facility]

## 2021-05-04 NOTE — TELEPHONE ENCOUNTER
Pending Prescriptions:                       Disp   Refills    escitalopram (LEXAPRO) 20 MG tablet [Pharm*90 tab*1        Sig: TAKE 1 TABLET BY MOUTH EVERY DAY WITH 10MG TABLETS    Routing refill request to provider for review/approval because:  Labs out of range:  PHQ9  PHQ-9 score:    PHQ 3/8/2021   PHQ-9 Total Score 8   Q9: Thoughts of better off dead/self-harm past 2 weeks Not at all

## 2021-05-04 NOTE — TELEPHONE ENCOUNTER
Spoke with Michaela about the dexamethasone she was taking post Cisplatin.  Her Cisplatin was discontinued and changed to Carboplatin.   Reviewed with Dr. Cuellar, She does not need to take the dexamethasone at home after her carboplatin.    Carboplatin information sheet emailed to Michaela.

## 2021-05-05 ENCOUNTER — MYC MEDICAL ADVICE (OUTPATIENT)
Dept: FAMILY MEDICINE | Facility: OTHER | Age: 71
End: 2021-05-05

## 2021-05-05 ENCOUNTER — TELEPHONE (OUTPATIENT)
Dept: ONCOLOGY | Facility: CLINIC | Age: 71
End: 2021-05-05

## 2021-05-05 DIAGNOSIS — N39.0 RECURRENT UTI: Primary | ICD-10-CM

## 2021-05-05 DIAGNOSIS — N17.9 ACUTE KIDNEY INJURY (H): ICD-10-CM

## 2021-05-05 DIAGNOSIS — N30.01 ACUTE CYSTITIS WITH HEMATURIA: ICD-10-CM

## 2021-05-05 DIAGNOSIS — N39.0 URINARY TRACT INFECTION: ICD-10-CM

## 2021-05-05 DIAGNOSIS — N39.0 RECURRENT UTI: ICD-10-CM

## 2021-05-05 DIAGNOSIS — C67.9 MALIGNANT NEOPLASM OF URINARY BLADDER, UNSPECIFIED SITE (H): Primary | ICD-10-CM

## 2021-05-05 LAB
ALBUMIN UR-MCNC: NEGATIVE MG/DL
ANION GAP SERPL CALCULATED.3IONS-SCNC: 7 MMOL/L (ref 3–14)
APPEARANCE UR: CLEAR
BILIRUB UR QL STRIP: NEGATIVE
BUN SERPL-MCNC: 31 MG/DL (ref 7–30)
CALCIUM SERPL-MCNC: 9.6 MG/DL (ref 8.5–10.1)
CHLORIDE SERPL-SCNC: 106 MMOL/L (ref 94–109)
CO2 SERPL-SCNC: 25 MMOL/L (ref 20–32)
COLOR UR AUTO: NORMAL
CREAT SERPL-MCNC: 1.26 MG/DL (ref 0.52–1.04)
GFR SERPL CREATININE-BSD FRML MDRD: 43 ML/MIN/{1.73_M2}
GLUCOSE SERPL-MCNC: 103 MG/DL (ref 70–99)
GLUCOSE UR STRIP-MCNC: NEGATIVE MG/DL
HGB UR QL STRIP: NEGATIVE
KETONES UR STRIP-MCNC: NEGATIVE MG/DL
LEUKOCYTE ESTERASE UR QL STRIP: NEGATIVE
NITRATE UR QL: NEGATIVE
PH UR STRIP: 6 PH (ref 5–7)
POTASSIUM SERPL-SCNC: 4.3 MMOL/L (ref 3.4–5.3)
SODIUM SERPL-SCNC: 138 MMOL/L (ref 133–144)
SOURCE: NORMAL
SP GR UR STRIP: 1.01 (ref 1–1.03)
UROBILINOGEN UR STRIP-MCNC: 0 MG/DL (ref 0–2)

## 2021-05-05 PROCEDURE — 87086 URINE CULTURE/COLONY COUNT: CPT | Performed by: INTERNAL MEDICINE

## 2021-05-05 PROCEDURE — 80048 BASIC METABOLIC PNL TOTAL CA: CPT | Performed by: INTERNAL MEDICINE

## 2021-05-05 PROCEDURE — 81003 URINALYSIS AUTO W/O SCOPE: CPT | Performed by: UROLOGY

## 2021-05-05 PROCEDURE — 36415 COLL VENOUS BLD VENIPUNCTURE: CPT | Performed by: INTERNAL MEDICINE

## 2021-05-05 RX ORDER — ESCITALOPRAM OXALATE 20 MG/1
TABLET ORAL
Qty: 90 TABLET | Refills: 1 | Status: SHIPPED | OUTPATIENT
Start: 2021-05-05 | End: 2021-08-12

## 2021-05-05 RX ORDER — CARVEDILOL 6.25 MG/1
6.25 TABLET ORAL 2 TIMES DAILY WITH MEALS
Qty: 60 TABLET | Refills: 11 | Status: SHIPPED | OUTPATIENT
Start: 2021-05-05 | End: 2022-09-06

## 2021-05-05 NOTE — TELEPHONE ENCOUNTER
Spoke with Michaela who states that she is feeling fine 1 day post carboplatin/ Gemcitabine.   Carboplatin information sheet emailed to her yesterday. She reviewed side effects. Reviewed side effects with her over the phone.   Reviewed take home anti- nausea medications if she needs them.   She is aware to call with any questions/ concerns and if she develops a fever of 100.3 or higher.

## 2021-05-05 NOTE — TELEPHONE ENCOUNTER
Left voicemail message with [facility] staff to call patient at earliest convenience to reschedule PET Scan.       ----- Message from Minerva Garcia RN sent at 5/5/2021 12:43 PM CDT -----  Regarding: PET CT  Hello-  The 5/21 date for the  PET CT doesn't Work for her.   Can we please r/s?  Thanks  Minerva

## 2021-05-06 ENCOUNTER — MYC MEDICAL ADVICE (OUTPATIENT)
Dept: FAMILY MEDICINE | Facility: OTHER | Age: 71
End: 2021-05-06

## 2021-05-06 ENCOUNTER — TELEPHONE (OUTPATIENT)
Dept: UROLOGY | Facility: CLINIC | Age: 71
End: 2021-05-06

## 2021-05-06 LAB
BACTERIA SPEC CULT: NORMAL
Lab: NORMAL
SPECIMEN SOURCE: NORMAL

## 2021-05-06 NOTE — PROGRESS NOTES
Heme/onc visit note  May 6, 2021  Oncology team: Dr. 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 The bladder cancer was invading the muscle, stage cT2. She is s/p TURBT and PET/CT 4/8/2021 showed likely residual tumor in the left trigone. She started neoadjuvant chemotherapy with cisplatin (split dose) 4/20. Her Cr was 0.9. 4/27 her Cr was 1.9. Cycle 2 she received carbo/gem 5/4 and plan is surgery on 6/1/2021 (radical cystectomy with ileal conduit).     Interval history:  No concerns today. Did pretty well with carbo/gem, less arthralgias and fatigue than with cisplatic but more nausea. Comes on suddenly. Took compazine with relief but doesn't like that it makes her sleepy. On omeprazole.     Been riding horse which has been good for her mental health.    Review Of Systems  General: No fevers, chills, night sweats.  Appetite good. Weight stable.   Heme: No bruising or bleeding  HEENT: + mucositis, using salt and soda   CV/pulm: No chest pain, cough, dyspnea  GI: + constipation, takes miralax  : No frequency, urgency, dysuria  Extremities: No lower extremity edema.   Neuro: No neuropathy  Psych: Mood good    Past medical history:  Patient Active Problem List   Diagnosis     Enthesopathy of hip region     Family history of stroke (cerebrovascular)     Trochanteric bursitis     Advanced directives, counseling/discussion     Health Care Home     Hypertension goal BP (blood pressure) < 140/90     Baker's cyst of knee     Patella-femoral syndrome     Adjustment disorder with depressed mood     Essential hypertension     Malignant neoplasm of upper-outer quadrant of left female  breast (H)     Encounter for antineoplastic chemotherapy     S/P bilateral mastectomy     Anxiety     Hyperlipidemia LDL goal <130     S/P reverse total shoulder arthroplasty, right     Prophylactic antibiotic for dental procedure indicated due to prior joint replacement     Chronic pain syndrome     Lumbar radiculopathy     Foraminal stenosis of lumbar region     Central stenosis of spinal canal     DDD (degenerative disc disease), lumbar     CKD (chronic kidney disease) stage 3, GFR 30-59 ml/min     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)     Magallanes's neuroma of right foot     Bilateral impacted cerumen     S/P lumbar fusion     Anemia     Carotid stenosis, bilateral L80%, R40%     Carotid artery plaque, bilateral     POSSIBLE Right internal carotid artery aneurysm     Symptomatic stenosis of left carotid artery     Personal history of malignant neoplasm of breast     Acute cystitis with hematuria     Benign paroxysmal positional vertigo, bilateral     Personal history of malignant neoplasm of bladder     Malignant neoplasm of urinary bladder, unspecified site (H)     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)       Medications:  acetaminophen (TYLENOL) 500 MG tablet, Take 1,000 mg by mouth 3 times daily as needed for mild pain (500MG X 2 = 1,000MG)  ALPRAZolam (XANAX) 0.5 MG tablet, TAKE 1 TABLET BY MOUTH DAILY AT BEDTIME AS NEEDED FOR SLEEP  aspirin (ASA) 81 MG chewable tablet, Take 1 tablet (81 mg) by mouth daily  atorvastatin (LIPITOR) 20 MG tablet, Take 1 tablet (20 mg) by mouth daily  carvedilol (COREG) 6.25 MG tablet, Take 1 tablet (6.25 mg) by mouth 2 times daily (with meals)  dexamethasone (DECADRON) 4 MG tablet, Take 1 tablet (4 mg) by mouth daily (with breakfast) Take for 3 days, starting the day after cisplatin (Day 2 and Day 9).  escitalopram (LEXAPRO) 10 MG tablet, TAKE 1 TABLET BY MOUTH EVERY DAY WITH 20MG TABLETS  escitalopram (LEXAPRO) 20 MG  "tablet, TAKE 1 TABLET BY MOUTH EVERY DAY WITH 10MG TABLETS  LORazepam (ATIVAN) 0.5 MG tablet, Take 1 tablet (0.5 mg) by mouth every 4 hours as needed (Anxiety, Nausea/Vomiting or Sleep)  omeprazole (PRILOSEC) 20 MG DR capsule, Take 20 mg by mouth daily  oxyCODONE-acetaminophen (PERCOCET) 5-325 MG tablet, Take 1 tablet by mouth every 6 hours as needed for severe pain Takes 1/2 tablet every 6 hrs as needed  polyethylene glycol (MIRALAX/GLYCOLAX) powder, Take 1 capful by mouth every morning   prochlorperazine (COMPAZINE) 10 MG tablet, Take 0.5 tablets (5 mg) by mouth every 6 hours as needed (Nausea/Vomiting)  Psyllium (METAMUCIL FIBER PO), Take 2 teaspoonful by mouth every morning (mix in with liquid and drink)  senna-docusate (SENOKOT-S/PERICOLACE) 8.6-50 MG tablet, Take 2 tablets by mouth 2 times daily (Patient taking differently: Take 2 tablets by mouth 2 times daily as needed for constipation )    No current facility-administered medications on file prior to visit.       Allergy:     Allergies   Allergen Reactions     No Known Drug Allergies      Oxybutynin      Patient reports symptoms of nausea and mind fogginess       Physical exam  /68   Pulse 72   Temp 98  F (36.7  C) (Temporal)   Ht 1.689 m (5' 6.5\")   Wt 63.8 kg (140 lb 11.2 oz)   SpO2 97%   BMI 22.37 kg/m    Wt Readings from Last 4 Encounters:   05/04/21 64.4 kg (141 lb 14.4 oz)   05/03/21 64.9 kg (143 lb)   04/27/21 62.2 kg (137 lb 1.6 oz)   04/20/21 62.1 kg (137 lb)     General: No acute distress  HEENT: Sclera anicteric. Oral mucosa pink and moist.  No mucositis or thrush  Lymph: No lymphadenopathy in neck, supraclavicular, and axillary areas  Heart: Regular, rate, and rhythm  Lungs: Clear to ascultation bilaterally  Abdomen: Positive bowel sounds. Soft, non-distended, non-tender. No organomegaly or mass.   Extremities: no lower extremity edema  Neuro: Cranial nerves grossly intact  Rash: none    Labs:  Results for VEDDERS, NICHOLAS M (MRN " [id number]) as of 5/7/2021 11:03   Ref. Range 5/5/2021 11:25   Sodium Latest Ref Range: 133 - 144 mmol/L 138   Potassium Latest Ref Range: 3.4 - 5.3 mmol/L 4.3   Chloride Latest Ref Range: 94 - 109 mmol/L 106   Carbon Dioxide Latest Ref Range: 20 - 32 mmol/L 25   Urea Nitrogen Latest Ref Range: 7 - 30 mg/dL 31 (H)   Creatinine Latest Ref Range: 0.52 - 1.04 mg/dL 1.26 (H)   GFR Estimate Latest Ref Range: >60 mL/min/1.73_m2 43 (L)   GFR Estimate If Black Latest Ref Range: >60 mL/min/1.73_m2 49 (L)   Calcium Latest Ref Range: 8.5 - 10.1 mg/dL 9.6   Anion Gap Latest Ref Range: 3 - 14 mmol/L 7   Glucose Latest Ref Range: 70 - 99 mg/dL 103 (H)       Imaging: none    Assessment and plan:  Michaela Dorman is a 70 year old female with and history of breast and bladder cancer.     High grade muscle invasive bladder cancer s/p TURBT with residual disease on PET/CT. She received 1 dose of cisplatin with acute rise in her creatinine. Cycle 2 she received carbo/gem 5/4 and plan is to give C2D8 and surgery on 6/1/2021 (radical cystectomy with ileal conduit). She will follow-up with Dr. Cuellar or Marty 6-8 weeks to discuss adjuvant chemotherapy.     Chronic kidney disease, stage IIIA with recent acute rise secondary to creatinine. Creatinine improving. She has follow-up with nephrology 5/17    Nausea. Trial of zofran ODT    H/o breast cancer. She has follow-up scheduled with Dr. Ferguson regarding this.     The total time of this encounter amounted to 50 minutes.This time included face to face time spent with the patient, prep work, ordering tests, and performing post visit documentation. I also spoke to Dr Cuellar over the phone about the plan.     Veronica Lazar PA-C

## 2021-05-06 NOTE — TELEPHONE ENCOUNTER
Patient is scheduled for surgery with Dr. Robles    Spoke with: Michaela    Date of Surgery: 6/1/2021    Location: [place]    Informed patient they will need an adult  yes    Pre-op with surgeon (if applicable): RTC on 5/13/2021    H&P: Scheduled with PAC on 5/18/2021    Pre-procedure COVID-19 Test: [place] on 5/28/2021    Additional imaging/appointments: [facility] and [facility] on 5/18/2021    Surgery packet: Mailed via [facility] on 5/6/2021    Additional comments: n/a

## 2021-05-07 ENCOUNTER — PATIENT OUTREACH (OUTPATIENT)
Dept: CARE COORDINATION | Facility: CLINIC | Age: 71
End: 2021-05-07

## 2021-05-07 ENCOUNTER — MYC MEDICAL ADVICE (OUTPATIENT)
Dept: CARE COORDINATION | Facility: CLINIC | Age: 71
End: 2021-05-07

## 2021-05-07 ENCOUNTER — ONCOLOGY VISIT (OUTPATIENT)
Dept: ONCOLOGY | Facility: CLINIC | Age: 71
End: 2021-05-07
Payer: COMMERCIAL

## 2021-05-07 VITALS
BODY MASS INDEX: 22.08 KG/M2 | HEART RATE: 72 BPM | WEIGHT: 140.7 LBS | DIASTOLIC BLOOD PRESSURE: 68 MMHG | OXYGEN SATURATION: 97 % | HEIGHT: 67 IN | TEMPERATURE: 98 F | SYSTOLIC BLOOD PRESSURE: 126 MMHG

## 2021-05-07 DIAGNOSIS — C67.9 UROTHELIAL CARCINOMA OF BLADDER WITH INVASION OF MUSCLE (H): ICD-10-CM

## 2021-05-07 DIAGNOSIS — R11.0 NAUSEA: Primary | ICD-10-CM

## 2021-05-07 PROCEDURE — 99215 OFFICE O/P EST HI 40 MIN: CPT | Performed by: PHYSICIAN ASSISTANT

## 2021-05-07 RX ORDER — ONDANSETRON 4 MG/1
4 TABLET, ORALLY DISINTEGRATING ORAL EVERY 8 HOURS PRN
Qty: 30 TABLET | Refills: 0 | Status: SHIPPED | OUTPATIENT
Start: 2021-05-07 | End: 2021-12-14 | Stop reason: ALTCHOICE

## 2021-05-07 ASSESSMENT — MIFFLIN-ST. JEOR: SCORE: 1177.9

## 2021-05-07 NOTE — PROGRESS NOTES
"Oncology Distress Screening Follow-up  Clinical Social Work  [facility]    Identified Concern and Score From Distress Screenin. How concerned are you about your ability to eat?   0           2. How concerned are you about unintended weight loss or your current weight?   0           3. How concerned are you about feeling depressed or very sad?   5           4. How concerned are you about feeling anxious or very scared?   9Abnormal            5. Do you struggle with the loss of meaning and bart in your life?   Not at all           6. How concerned are you about work and home life issues that may be affected by your cancer?   10Abnormal            7. How concerned are you about knowing what resources are available to help you?   0           8. Do you currently have what you would describe as Zoroastrianism or spiritual struggles?              Not at all                   Date of Distress Screenin2021      Intervention/Education Provided:: ADEN called and spoke to Michaela this afternoon. ADEN introduced self and reason for call, noting elevated distress score. Michaela reports now, however, that she is feeling ok. She states that after speaking with Veronica Lazar she feels reassured and her worry now that she has a better understanding of the plan has gone down. Michaela and ADEN spoke for a bit about her experience of having cancer and feeling things were better, then getting this new diagnosis. It has brought up a lot of hard feelings. Supportive counseling provided.  Michaela states that she feels that \"It is pretty normal.\" ADEN acknowledged that it is common to feel some worry when adjusting to a new diagnosis and preparing for treatment. That said, if the anxiety gets worse or if she feels she could use additional resources to manage those feelings or to cope with these changes, help is available. Michaela was appreciative, but declined any resources at this time. ADNE offered to send contact information via [employer] and " encouraged Michaela to call any time with questions or needs. Michaela agreeable to this plan.       Follow-up Required: SW will continue to remain available as needed and appropriate.         JATINDER Box, [facility]  Clinical , Adult Oncology  Phone: [phone]

## 2021-05-07 NOTE — PATIENT INSTRUCTIONS
Cancel scan 5/24 and chemo 5/25 and 6/1  Follow-up Dr Cuellar OR Dr Ferguson 6-8 weeks after surgery    Please follow up with Dr. Ferguson 4-6 weeks after surgery.      Lab Date/Time:  7/14/21 at 8:00    Office visit follow up with Dr. Ferguson Date/Time:  7/15/21 at 2:00     If you have any questions or concerns please feel free to call.    If you need to reschedule please call:  Clinic or Lab Appointment - [phone]  Infusion - [phone]  Imaging - [phone]    Juan José Hernandez, RN, BSN, OCN  [facility]   Oncology/Hematology Care Coordinator RN  [facility]  [phone]    After Hours Oncology Nurse Line - [phone]

## 2021-05-07 NOTE — PROGRESS NOTES
DISCHARGE PLAN:  Next appointments: See patient instruction section  Departure Mode: Ambulatory  Accompanied by:   10 minutes for nursing discharge (face to face time)     Michaelayony Larsonmiki is here today for Oncology follow up.  Writing nurse seen patient after Medical Oncology appointment to address questions/concerns/coordinate care. Patient will continue with treatment next week.  Surgery moved up so cancelled scans and treatment end of month.  Patient transferring care back to Dr. Ferguson.  Follow up scheduled in July with labs prior for 6 month breast and post surgical for Urothelial Cancer.  Reviewed schedule in detail and cancelled appointments.  Provided calendar.  Appointments scheduled.  See patient instructions and Oncologist's Progress note for further details. Questions and concerns addressed to patient's satisfaction. Patient verbalized and demonstrated understanding of plan.  Contact information provided and patient is encouraged to call with any that arise in the interim of care.    Juan José Hernandez, RN, BSN, OCN   Oncology Care Coordinator RN  [facility]  [phone]  5/7/2021, 12:16 PM

## 2021-05-07 NOTE — TELEPHONE ENCOUNTER
FUTURE VISIT INFORMATION      SURGERY INFORMATION:    Date: 6/1/21    Location: uu or    Surgeon:  Leonardo Robles MD    Anesthesia Type:  general    Procedure: CYSTECTOMY, RADICAL, WITH ILEAL CONDUIT CREATION    RECORDS REQUESTED FROM:       Primary Care Provider: Phani Tapia PA-C- [facility]    Pertinent Medical History: hypertension, carotid stenosis, carotid artery plaque    Most recent EKG+ Tracing: 10/8/20

## 2021-05-07 NOTE — LETTER
5/7/2021         RE: Michaela Dorman  [address]  [facility] [address]      Heme/onc visit note  May 6, 2021  Oncology team: Dr. 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 The bladder cancer was invading the muscle, stage cT2. She is s/p TURBT and PET/CT 4/8/2021 showed likely residual tumor in the left trigone. She started neoadjuvant chemotherapy with cisplatin (split dose) 4/20. Her Cr was 0.9. 4/27 her Cr was 1.9. Cycle 2 she received carbo/gem 5/4 and plan is surgery on 6/1/2021 (radical cystectomy with ileal conduit).     Interval history:  No concerns today. Did pretty well with carbo/gem, less arthralgias and fatigue than with cisplatic but more nausea. Comes on suddenly. Took compazine with relief but doesn't like that it makes her sleepy. On omeprazole.     Been riding horse which has been good for her mental health.    Review Of Systems  General: No fevers, chills, night sweats.  Appetite good. Weight stable.   Heme: No bruising or bleeding  HEENT: + mucositis, using salt and soda   CV/pulm: No chest pain, cough, dyspnea  GI: + constipation, takes miralax  : No frequency, urgency, dysuria  Extremities: No lower extremity edema.   Neuro: No neuropathy  Psych: Mood good    Past medical history:  Patient Active Problem List   Diagnosis     Enthesopathy of hip region     Family history of stroke (cerebrovascular)     Trochanteric bursitis     Advanced directives, counseling/discussion     Health Care Home     Hypertension goal BP (blood pressure) < 140/90     Baker's cyst of knee     Patella-femoral syndrome     Adjustment disorder with depressed mood      Essential hypertension     Malignant neoplasm of upper-outer quadrant of left female breast (H)     Encounter for antineoplastic chemotherapy     S/P bilateral mastectomy     Anxiety     Hyperlipidemia LDL goal <130     S/P reverse total shoulder arthroplasty, right     Prophylactic antibiotic for dental procedure indicated due to prior joint replacement     Chronic pain syndrome     Lumbar radiculopathy     Foraminal stenosis of lumbar region     Central stenosis of spinal canal     DDD (degenerative disc disease), lumbar     CKD (chronic kidney disease) stage 3, GFR 30-59 ml/min     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)     Magallanes's neuroma of right foot     Bilateral impacted cerumen     S/P lumbar fusion     Anemia     Carotid stenosis, bilateral L80%, R40%     Carotid artery plaque, bilateral     POSSIBLE Right internal carotid artery aneurysm     Symptomatic stenosis of left carotid artery     Personal history of malignant neoplasm of breast     Acute cystitis with hematuria     Benign paroxysmal positional vertigo, bilateral     Personal history of malignant neoplasm of bladder     Malignant neoplasm of urinary bladder, unspecified site (H)     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)       Medications:  acetaminophen (TYLENOL) 500 MG tablet, Take 1,000 mg by mouth 3 times daily as needed for mild pain (500MG X 2 = 1,000MG)  ALPRAZolam (XANAX) 0.5 MG tablet, TAKE 1 TABLET BY MOUTH DAILY AT BEDTIME AS NEEDED FOR SLEEP  aspirin (ASA) 81 MG chewable tablet, Take 1 tablet (81 mg) by mouth daily  atorvastatin (LIPITOR) 20 MG tablet, Take 1 tablet (20 mg) by mouth daily  carvedilol (COREG) 6.25 MG tablet, Take 1 tablet (6.25 mg) by mouth 2 times daily (with meals)  dexamethasone (DECADRON) 4 MG tablet, Take 1 tablet (4 mg) by mouth daily (with breakfast) Take for 3 days, starting the day after cisplatin (Day 2 and Day 9).  escitalopram (LEXAPRO) 10 MG tablet,  "TAKE 1 TABLET BY MOUTH EVERY DAY WITH 20MG TABLETS  escitalopram (LEXAPRO) 20 MG tablet, TAKE 1 TABLET BY MOUTH EVERY DAY WITH 10MG TABLETS  LORazepam (ATIVAN) 0.5 MG tablet, Take 1 tablet (0.5 mg) by mouth every 4 hours as needed (Anxiety, Nausea/Vomiting or Sleep)  omeprazole (PRILOSEC) 20 MG DR capsule, Take 20 mg by mouth daily  oxyCODONE-acetaminophen (PERCOCET) 5-325 MG tablet, Take 1 tablet by mouth every 6 hours as needed for severe pain Takes 1/2 tablet every 6 hrs as needed  polyethylene glycol (MIRALAX/GLYCOLAX) powder, Take 1 capful by mouth every morning   prochlorperazine (COMPAZINE) 10 MG tablet, Take 0.5 tablets (5 mg) by mouth every 6 hours as needed (Nausea/Vomiting)  Psyllium (METAMUCIL FIBER PO), Take 2 teaspoonful by mouth every morning (mix in with liquid and drink)  senna-docusate (SENOKOT-S/PERICOLACE) 8.6-50 MG tablet, Take 2 tablets by mouth 2 times daily (Patient taking differently: Take 2 tablets by mouth 2 times daily as needed for constipation )    No current facility-administered medications on file prior to visit.       Allergy:     Allergies   Allergen Reactions     No Known Drug Allergies      Oxybutynin      Patient reports symptoms of nausea and mind fogginess       Physical exam  /68   Pulse 72   Temp 98  F (36.7  C) (Temporal)   Ht 1.689 m (5' 6.5\")   Wt 63.8 kg (140 lb 11.2 oz)   SpO2 97%   BMI 22.37 kg/m    Wt Readings from Last 4 Encounters:   05/04/21 64.4 kg (141 lb 14.4 oz)   05/03/21 64.9 kg (143 lb)   04/27/21 62.2 kg (137 lb 1.6 oz)   04/20/21 62.1 kg (137 lb)     General: No acute distress  HEENT: Sclera anicteric. Oral mucosa pink and moist.  No mucositis or thrush  Lymph: No lymphadenopathy in neck, supraclavicular, and axillary areas  Heart: Regular, rate, and rhythm  Lungs: Clear to ascultation bilaterally  Abdomen: Positive bowel sounds. Soft, non-distended, non-tender. No organomegaly or mass.   Extremities: no lower extremity edema  Neuro: Cranial " nerves grossly intact  Rash: none    Labs:  Results for NICHOLAS DORMAN (MRN [id number]) as of 5/7/2021 11:03   Ref. Range 5/5/2021 11:25   Sodium Latest Ref Range: 133 - 144 mmol/L 138   Potassium Latest Ref Range: 3.4 - 5.3 mmol/L 4.3   Chloride Latest Ref Range: 94 - 109 mmol/L 106   Carbon Dioxide Latest Ref Range: 20 - 32 mmol/L 25   Urea Nitrogen Latest Ref Range: 7 - 30 mg/dL 31 (H)   Creatinine Latest Ref Range: 0.52 - 1.04 mg/dL 1.26 (H)   GFR Estimate Latest Ref Range: >60 mL/min/1.73_m2 43 (L)   GFR Estimate If Black Latest Ref Range: >60 mL/min/1.73_m2 49 (L)   Calcium Latest Ref Range: 8.5 - 10.1 mg/dL 9.6   Anion Gap Latest Ref Range: 3 - 14 mmol/L 7   Glucose Latest Ref Range: 70 - 99 mg/dL 103 (H)       Imaging: none    Assessment and plan:  Nicholas Dorman is a 70 year old female with and history of breast and bladder cancer.     High grade muscle invasive bladder cancer s/p TURBT with residual disease on PET/CT. She received 1 dose of cisplatin with acute rise in her creatinine. Cycle 2 she received carbo/gem 5/4 and plan is to give C2D8 and surgery on 6/1/2021 (radical cystectomy with ileal conduit). She will follow-up with Dr. Cuellar or Marty 6-8 weeks to discuss adjuvant chemotherapy.     Chronic kidney disease, stage IIIA with recent acute rise secondary to creatinine. Creatinine improving. She has follow-up with nephrology 5/17    Nausea. Trial of zofran ODT    H/o breast cancer. She has follow-up scheduled with Dr. Ferguson regarding this.     The total time of this encounter amounted to 50 minutes.This time included face to face time spent with the patient, prep work, ordering tests, and performing post visit documentation. I also spoke to Dr Cuellar over the phone about the plan.     Veronica Lazar PA-C    DISCHARGE PLAN:  Next appointments: See patient instruction section  Departure Mode: Ambulatory  Accompanied by:   10 minutes for nursing discharge (face to face time)     Nicholas PRESCOTT  Dandy is here today for Oncology follow up.  Writing nurse seen patient after Medical Oncology appointment to address questions/concerns/coordinate care. Patient will continue with treatment next week.  Surgery moved up so cancelled scans and treatment end of month.  Patient transferring care back to Dr. Ferguson.  Follow up scheduled in July with labs prior for 6 month breast and post surgical for Urothelial Cancer.  Reviewed schedule in detail and cancelled appointments.  Provided calendar.  Appointments scheduled.  See patient instructions and Oncologist's Progress note for further details. Questions and concerns addressed to patient's satisfaction. Patient verbalized and demonstrated understanding of plan.  Contact information provided and patient is encouraged to call with any that arise in the interim of care.    Juan José Hernandez RN, BSN, OCN   Oncology Care Coordinator RN  [facility]  [phone]  5/7/2021, 12:16 PM    Veronica Lazar PA-C

## 2021-05-08 ENCOUNTER — HEALTH MAINTENANCE LETTER (OUTPATIENT)
Age: 71
End: 2021-05-08

## 2021-05-11 ENCOUNTER — TELEPHONE (OUTPATIENT)
Dept: ONCOLOGY | Facility: CLINIC | Age: 71
End: 2021-05-11

## 2021-05-11 NOTE — TELEPHONE ENCOUNTER
Left voicemail message for [facility] staff to cancel any/all PET Scan scheduled for this patient.     ----- Message from Minerva Garcia RN sent at 5/11/2021  8:48 AM CDT -----  Regarding: FW: PET CT  This was the message .  ----- Message -----  From: Flaco Cuellar MD  Sent: 5/10/2021   9:34 PM CDT  To: Minerva Garcia RN  Subject: RE: PET CT                                       We will cancel the PET since she is just directly going for surgery.     Flaco   ----- Message -----  From: Minerva Garcia RN  Sent: 5/6/2021   5:06 PM CDT  To: Flaco Cuellar MD  Subject: FW: PET CT                                       Dr. Cuellar- Does this need to be postponed because her chemo was held?  Please advise-   Minerva  ----- Message -----  From: Luz Marina Alberto  Sent: 5/6/2021   9:59 AM CDT  To: Minerva Garcia RN  Subject: RE: PET CT                                       FEDERICO Hart,     Patient was rescheduled to 5/24 21 at [facility] for PET Scan, she is aware.    Thank you,  Luz Marina  ----- Message -----  From: Minerva Garcia RN  Sent: 5/5/2021  12:43 PM CDT  To: Luz Marina Alberto  Subject: PET CT                                           Hello-  The 5/21 date for the  PET CT doesn't Work for her.   Can we please r/s?  Thanks  Minerva

## 2021-05-12 ENCOUNTER — INFUSION THERAPY VISIT (OUTPATIENT)
Dept: INFUSION THERAPY | Facility: CLINIC | Age: 71
End: 2021-05-12
Attending: INTERNAL MEDICINE
Payer: MEDICARE

## 2021-05-12 VITALS
HEART RATE: 69 BPM | DIASTOLIC BLOOD PRESSURE: 81 MMHG | TEMPERATURE: 98.8 F | OXYGEN SATURATION: 97 % | WEIGHT: 137.8 LBS | SYSTOLIC BLOOD PRESSURE: 165 MMHG | BODY MASS INDEX: 21.91 KG/M2 | RESPIRATION RATE: 18 BRPM

## 2021-05-12 DIAGNOSIS — C67.9 UROTHELIAL CARCINOMA OF BLADDER WITH INVASION OF MUSCLE (H): Primary | ICD-10-CM

## 2021-05-12 DIAGNOSIS — C50.412 MALIGNANT NEOPLASM OF UPPER-OUTER QUADRANT OF LEFT FEMALE BREAST, UNSPECIFIED ESTROGEN RECEPTOR STATUS (H): ICD-10-CM

## 2021-05-12 DIAGNOSIS — Z51.11 ENCOUNTER FOR ANTINEOPLASTIC CHEMOTHERAPY: ICD-10-CM

## 2021-05-12 LAB
ALBUMIN SERPL-MCNC: 4 G/DL (ref 3.4–5)
ALP SERPL-CCNC: 59 U/L (ref 40–150)
ALT SERPL W P-5'-P-CCNC: 48 U/L (ref 0–50)
ANION GAP SERPL CALCULATED.3IONS-SCNC: 1 MMOL/L (ref 3–14)
AST SERPL W P-5'-P-CCNC: 25 U/L (ref 0–45)
BASOPHILS # BLD AUTO: 0 10E9/L (ref 0–0.2)
BASOPHILS NFR BLD AUTO: 0.8 %
BILIRUB SERPL-MCNC: 0.2 MG/DL (ref 0.2–1.3)
BUN SERPL-MCNC: 27 MG/DL (ref 7–30)
CALCIUM SERPL-MCNC: 9.3 MG/DL (ref 8.5–10.1)
CHLORIDE SERPL-SCNC: 108 MMOL/L (ref 94–109)
CO2 SERPL-SCNC: 29 MMOL/L (ref 20–32)
CREAT SERPL-MCNC: 1.05 MG/DL (ref 0.52–1.04)
DIFFERENTIAL METHOD BLD: ABNORMAL
EOSINOPHIL # BLD AUTO: 0.1 10E9/L (ref 0–0.7)
EOSINOPHIL NFR BLD AUTO: 2.5 %
ERYTHROCYTE [DISTWIDTH] IN BLOOD BY AUTOMATED COUNT: 12.4 % (ref 10–15)
GFR SERPL CREATININE-BSD FRML MDRD: 53 ML/MIN/{1.73_M2}
GLUCOSE SERPL-MCNC: 86 MG/DL (ref 70–99)
HCT VFR BLD AUTO: 28.3 % (ref 35–47)
HGB BLD-MCNC: 9.1 G/DL (ref 11.7–15.7)
IMM GRANULOCYTES # BLD: 0 10E9/L (ref 0–0.4)
IMM GRANULOCYTES NFR BLD: 0.3 %
LYMPHOCYTES # BLD AUTO: 1.7 10E9/L (ref 0.8–5.3)
LYMPHOCYTES NFR BLD AUTO: 47.1 %
MAGNESIUM SERPL-MCNC: 1.9 MG/DL (ref 1.6–2.3)
MCH RBC QN AUTO: 33.8 PG (ref 26.5–33)
MCHC RBC AUTO-ENTMCNC: 32.2 G/DL (ref 31.5–36.5)
MCV RBC AUTO: 105 FL (ref 78–100)
MONOCYTES # BLD AUTO: 0.2 10E9/L (ref 0–1.3)
MONOCYTES NFR BLD AUTO: 6.6 %
NEUTROPHILS # BLD AUTO: 1.5 10E9/L (ref 1.6–8.3)
NEUTROPHILS NFR BLD AUTO: 42.7 %
NRBC # BLD AUTO: 0 10*3/UL
NRBC BLD AUTO-RTO: 0 /100
PLATELET # BLD AUTO: 156 10E9/L (ref 150–450)
POTASSIUM SERPL-SCNC: 4.5 MMOL/L (ref 3.4–5.3)
PROT SERPL-MCNC: 7.3 G/DL (ref 6.8–8.8)
RBC # BLD AUTO: 2.69 10E12/L (ref 3.8–5.2)
SODIUM SERPL-SCNC: 138 MMOL/L (ref 133–144)
WBC # BLD AUTO: 3.6 10E9/L (ref 4–11)

## 2021-05-12 PROCEDURE — 83735 ASSAY OF MAGNESIUM: CPT | Performed by: INTERNAL MEDICINE

## 2021-05-12 PROCEDURE — 250N000009 HC RX 250: Performed by: INTERNAL MEDICINE

## 2021-05-12 PROCEDURE — 96376 TX/PRO/DX INJ SAME DRUG ADON: CPT

## 2021-05-12 PROCEDURE — 96361 HYDRATE IV INFUSION ADD-ON: CPT

## 2021-05-12 PROCEDURE — 85025 COMPLETE CBC W/AUTO DIFF WBC: CPT | Performed by: INTERNAL MEDICINE

## 2021-05-12 PROCEDURE — 96413 CHEMO IV INFUSION 1 HR: CPT

## 2021-05-12 PROCEDURE — 80053 COMPREHEN METABOLIC PANEL: CPT | Performed by: INTERNAL MEDICINE

## 2021-05-12 PROCEDURE — 96367 TX/PROPH/DG ADDL SEQ IV INF: CPT

## 2021-05-12 PROCEDURE — 250N000011 HC RX IP 250 OP 636: Performed by: INTERNAL MEDICINE

## 2021-05-12 PROCEDURE — 258N000003 HC RX IP 258 OP 636: Performed by: INTERNAL MEDICINE

## 2021-05-12 RX ORDER — HEPARIN SODIUM (PORCINE) LOCK FLUSH IV SOLN 100 UNIT/ML 100 UNIT/ML
5 SOLUTION INTRAVENOUS
Status: DISCONTINUED | OUTPATIENT
Start: 2021-05-12 | End: 2021-05-12 | Stop reason: HOSPADM

## 2021-05-12 RX ORDER — PALONOSETRON 0.05 MG/ML
0.25 INJECTION, SOLUTION INTRAVENOUS ONCE
Status: COMPLETED | OUTPATIENT
Start: 2021-05-12 | End: 2021-05-12

## 2021-05-12 RX ADMIN — GEMCITABINE 1600 MG: 38 INJECTION, SOLUTION INTRAVENOUS at 13:05

## 2021-05-12 RX ADMIN — FOSAPREPITANT: 150 INJECTION, POWDER, LYOPHILIZED, FOR SOLUTION INTRAVENOUS at 12:28

## 2021-05-12 RX ADMIN — PALONOSETRON 0.25 MG: 0.05 INJECTION, SOLUTION INTRAVENOUS at 12:11

## 2021-05-12 RX ADMIN — SODIUM CHLORIDE 1000 ML: 9 INJECTION, SOLUTION INTRAVENOUS at 12:04

## 2021-05-12 RX ADMIN — FAMOTIDINE 20 MG: 10 INJECTION, SOLUTION INTRAVENOUS at 12:14

## 2021-05-12 RX ADMIN — Medication 5 ML: at 14:05

## 2021-05-12 ASSESSMENT — PAIN SCALES - GENERAL: PAINLEVEL: NO PAIN (0)

## 2021-05-12 NOTE — PROGRESS NOTES
Infusion Nursing Note:  Michaela Dorman presents today for C2D8 Gemzar.    Patient seen by provider today: No   present during visit today: Not Applicable.    Note: N/A.  Patient  did meet criteria for an asymptomatic covid-19 PCR test in infusion today. Patient  declined the covid-19 test.    Intravenous Access:  Labs drawn without difficulty.  Implanted Port.    Treatment Conditions:  Lab Results   Component Value Date    HGB 9.1 05/12/2021     Lab Results   Component Value Date    WBC 3.6 05/12/2021      Lab Results   Component Value Date    ANEU 1.5 05/12/2021     Lab Results   Component Value Date     05/12/2021      Lab Results   Component Value Date     05/12/2021                   Lab Results   Component Value Date    POTASSIUM 4.5 05/12/2021           Lab Results   Component Value Date    MAG 1.9 05/12/2021            Lab Results   Component Value Date    CR 1.05 05/12/2021                   Lab Results   Component Value Date    VANDANA 9.3 05/12/2021                Lab Results   Component Value Date    BILITOTAL 0.2 05/12/2021           Lab Results   Component Value Date    ALBUMIN 4.0 05/12/2021                    Lab Results   Component Value Date    ALT 48 05/12/2021           Lab Results   Component Value Date    AST 25 05/12/2021       Results reviewed, labs MET treatment parameters, ok to proceed with treatment.      Post Infusion Assessment:  Patient tolerated infusion without incident. B/P elevated after tx. Patient states she forgot to take meds.. Will monitor at home. Asymptomatic.  Patient observed for 20 minutes post chemo to finish iv hydration.  Blood return noted pre and post infusion.  Site patent and intact, free from redness, edema or discomfort.  Access discontinued per protocol.       Discharge Plan:   Discharge instructions reviewed with: Patient.  Patient and/or family verbalized understanding of discharge instructions and all questions answered.  Patient  discharged in stable condition accompanied by: self.  Departure Mode: Ambulatory.    Tami Knowles RN

## 2021-05-13 ENCOUNTER — OFFICE VISIT (OUTPATIENT)
Dept: UROLOGY | Facility: CLINIC | Age: 71
End: 2021-05-13
Payer: COMMERCIAL

## 2021-05-13 VITALS — DIASTOLIC BLOOD PRESSURE: 82 MMHG | HEART RATE: 70 BPM | SYSTOLIC BLOOD PRESSURE: 133 MMHG

## 2021-05-13 DIAGNOSIS — C67.8 MALIGNANT NEOPLASM OF OVERLAPPING SITES OF BLADDER (H): Primary | ICD-10-CM

## 2021-05-13 PROCEDURE — 99214 OFFICE O/P EST MOD 30 MIN: CPT | Performed by: UROLOGY

## 2021-05-13 ASSESSMENT — PAIN SCALES - GENERAL: PAINLEVEL: MODERATE PAIN (4)

## 2021-05-13 NOTE — PROGRESS NOTES
CHIEF COMPLAINT   It was my pleasure to see Michaela Dorman who is a 70 year old female for follow-up of bladder cancer.      HPI  Michaela Dorman is a very pleasant 70 year old female who presents with a history of muscle-invasive bladder cancer.  Presented initially with dysuria and found to have bladder mass. TURBT with Dr. Jack completed 3/18/2021. Pathology as below.      Does have history of smoking but quit many years ago. Currently with no significant hematuria.    She has received an initial cycle of chemotherapy, and had significant TAMIR. Given she did not tolerate this well, decision has been made to proceed directly to cystectomy. She returns today to discuss surgery.     TURBT 3/18/2021  FINAL DIAGNOSIS:   A. Bladder, anterior wall, biopsy:   - Non-invasive low grade papillary urothelial carcinoma     B. Bladder, left trigone, tumor, TURBT:   - Invasive high-grade urothelial carcinoma   - Tumor extensively invades muscularis propria     CT chest/abd/pelvis 3/24/2021  IMPRESSION:   1. There is mild irregularity of the posterior urinary bladder wall  which could be the site of the patient's malignancy. Urinary bladder  is otherwise unremarkable. There is a benign normal variant of  bilateral fetal renal lobulation. No other urinary system abnormality  is seen.  2. No definite metastases are identified.  3. Tiny nodular density in the medial right minor fissure measuring  less than 0.3 cm is of doubtful clinical significance.     PHYSICAL EXAM  Patient is a 70 year old  female   Vitals: Blood pressure 133/82, pulse 70, not currently breastfeeding.  General Appearance Adult: There is no height or weight on file to calculate BMI.  Alert, no acute distress, oriented  Lungs: no respiratory distress, or pursed lip breathing  Abdomen: soft, nontender, no organomegaly or masses  Back: no CVAT  Neuro: Alert, oriented, speech and mentation normal  Psych: affect and mood normal    Outside and Past Medical  records:  Review of prior notes with in [facility] - medical oncology  Review of the result(s) of each unique test - CT, pathology, BMP    ASSESSMENT and PLAN  70 year old female with history of muscle-invasive bladder cancer. She attempted neoadjuvant chemotherapy, but given poor response to this with TAMIR, we will plan to proceed to cystectomy.      We discussed radical cystectomy and the potential complications associated with it, including a 2-3% perioperative mortality risk, and the risk of complications is about 60%-70%. Potential complications include, but are not limited to MI, stroke, DVT/PE, bleeding, infection, injury to surrounding structures, urine leak, and various urinary diversion-related complications. We also discussed that about 20-30% of patients will need to go to a rehab facility after discharge from the hospital.  Though most patients get through this operation, there is often a significant delay in regaining appetite and sometimes takes several months to feel back to normal.     We discussed the various types of urinary diversion including ileal conduit, [place] Pouch and ileal neobladder.  The various pros and cons of each type of urinary diversion was discussed and all questions were answered.  Prospective quality of life data is lacking that compares the various diversion types, but retrospective data suggest the differences are small by diversion type. Based on location of her tumor at trigone, we discussed potential concerns of recurrence with neobladder. Her initial impression is that she would prefer ileal conduit.      The patient and her family had many questions and we went over these carefully.      The patient has decided to proceed with a radical cystectomy and extended lymph node dissection. She has decided to proceed with ileal conduit urinary diversion.     Plan:  - Cystectomy scheduled for next month    35 minutes spent on the date of the encounter doing chart review, history and  exam, documentation and further activities as noted above.    Leonardo Robles MD  Urology  [facility]

## 2021-05-13 NOTE — NURSING NOTE
Chief Complaint   Patient presents with     Consult For     discuss surgery      - Maggie Palm,   EMT Clinic Support

## 2021-05-14 NOTE — PROGRESS NOTES
[facility] Assessment [facility] Medication History Note    Medication history completed on May 14, 2021 by this writer. See Epic admission navigator for prior to admission medications. Operating room staff will still need to confirm medications and last dose information on day of surgery.     Medication history interview sources:  patient interview, payor information.     Changes made to PTA medication list (reason)  Added: none  Deleted: dexamethasone (completed), psyllium.   Changed: lexapro dose/sig, percocet dose,     Additional medication history information (including reliability of information, actions taken by pharmacist):    -- No recent (within 30 days) course of antibiotics  -- Patient declines being on any other prescription or over-the-counter medications    Prior to Admission medications    Medication Sig Last Dose Taking? Auth Provider   acetaminophen (TYLENOL) 500 MG tablet Take 1,000 mg by mouth 3 times daily as needed for mild pain (500MG X 2 = 1,000MG) Taking Yes Reported, Patient   ALPRAZolam (XANAX) 0.5 MG tablet TAKE 1 TABLET BY MOUTH DAILY AT BEDTIME AS NEEDED FOR SLEEP Taking Yes Phani Tapia PA-C   aspirin (ASA) 81 MG chewable tablet Take 1 tablet (81 mg) by mouth daily Taking Yes Phani Tapia PA-C   atorvastatin (LIPITOR) 20 MG tablet Take 1 tablet (20 mg) by mouth daily Taking Yes Phani Tapia PA-C   carvedilol (COREG) 6.25 MG tablet Take 1 tablet (6.25 mg) by mouth 2 times daily (with meals) Taking Yes Akiko Smith MD   escitalopram (LEXAPRO) 10 MG tablet TAKE 1 TABLET BY MOUTH EVERY DAY WITH 20MG TABLETS  Patient taking differently: Take 10 mg by mouth daily Takes with 20 mg tablet for total daily dose of 30 mg in the morning. Taking Yes Phani Tapia PA-C   escitalopram (LEXAPRO) 20 MG tablet TAKE 1 TABLET BY MOUTH EVERY DAY WITH 10MG TABLETS  Patient taking differently: Take 20 mg by mouth daily Takes with 20 mg tablet for total daily dose of 30 mg in the morning.  Taking Yes Phani Tapia PA-C   LORazepam (ATIVAN) 0.5 MG tablet Take 1 tablet (0.5 mg) by mouth every 4 hours as needed (Anxiety, Nausea/Vomiting or Sleep) Taking Yes Flaco Cuellar MD   omeprazole (PRILOSEC) 20 MG DR capsule Take 20 mg by mouth daily Taking Yes Reported, Patient   ondansetron (ZOFRAN-ODT) 4 MG ODT tab Take 1 tablet (4 mg) by mouth every 8 hours as needed for nausea Taking Yes Veronica Lazar PA-C   oxyCODONE-acetaminophen (PERCOCET) 5-325 MG tablet Take 0.5-1 tablets by mouth every 6 hours as needed for severe pain  Taking Yes Reported, Patient   polyethylene glycol (MIRALAX/GLYCOLAX) powder Take 17 g by mouth every morning  Taking Yes Val Khan MD   prochlorperazine (COMPAZINE) 10 MG tablet Take 0.5 tablets (5 mg) by mouth every 6 hours as needed (Nausea/Vomiting) Taking Yes Flaco Cuellar MD   senna-docusate (SENOKOT-S/PERICOLACE) 8.6-50 MG tablet Take 2 tablets by mouth 2 times daily  Patient taking differently: Take 2 tablets by mouth 2 times daily as needed for constipation  Taking Yes Val Khan MD        Medication history completed by: Terrell Garnica, [facility]

## 2021-05-15 DIAGNOSIS — Z11.59 ENCOUNTER FOR SCREENING FOR OTHER VIRAL DISEASES: ICD-10-CM

## 2021-05-17 ENCOUNTER — VIRTUAL VISIT (OUTPATIENT)
Dept: ONCOLOGY | Facility: CLINIC | Age: 71
End: 2021-05-17
Attending: NURSE PRACTITIONER
Payer: MEDICARE

## 2021-05-17 ENCOUNTER — MYC MEDICAL ADVICE (OUTPATIENT)
Dept: ONCOLOGY | Facility: CLINIC | Age: 71
End: 2021-05-17

## 2021-05-17 DIAGNOSIS — C50.412 MALIGNANT NEOPLASM OF UPPER-OUTER QUADRANT OF LEFT FEMALE BREAST, UNSPECIFIED ESTROGEN RECEPTOR STATUS (H): Primary | ICD-10-CM

## 2021-05-17 DIAGNOSIS — C67.9 UROTHELIAL CARCINOMA OF BLADDER WITH INVASION OF MUSCLE (H): ICD-10-CM

## 2021-05-17 DIAGNOSIS — D69.6 THROMBOCYTOPENIA (H): ICD-10-CM

## 2021-05-17 DIAGNOSIS — T45.1X5A CHEMOTHERAPY-INDUCED NEUTROPENIA (H): ICD-10-CM

## 2021-05-17 DIAGNOSIS — D70.1 CHEMOTHERAPY-INDUCED NEUTROPENIA (H): ICD-10-CM

## 2021-05-17 DIAGNOSIS — Z11.59 ENCOUNTER FOR SCREENING FOR OTHER VIRAL DISEASES: ICD-10-CM

## 2021-05-17 PROBLEM — C67.8 MALIGNANT NEOPLASM OF OVERLAPPING SITES OF BLADDER (H): Status: ACTIVE | Noted: 2021-03-08

## 2021-05-17 PROCEDURE — 99215 OFFICE O/P EST HI 40 MIN: CPT | Mod: 95 | Performed by: NURSE PRACTITIONER

## 2021-05-17 RX ORDER — DIPHENHYDRAMINE HYDROCHLORIDE AND LIDOCAINE HYDROCHLORIDE AND ALUMINUM HYDROXIDE AND MAGNESIUM HYDRO
5-10 KIT EVERY 6 HOURS PRN
Qty: 500 ML | Refills: 0 | Status: SHIPPED | OUTPATIENT
Start: 2021-05-17 | End: 2021-09-28

## 2021-05-17 NOTE — TELEPHONE ENCOUNTER
Reviewed patient's message with Stanislav Guzman CNP. Stanislav is happy to see patient virtually today to further assess her symptoms.     Contacted patient who is agreeable to video visit today at 3:30PM with GREGORIO [place].    MARY GRACE ScottN, RN, PHN, OCN  Oncology Care Coordinator  [facility]

## 2021-05-17 NOTE — PROGRESS NOTES
"  The patient has been notified of following:      \"This video visit will be conducted via a call between you and your physician/provider. We have found that certain health care needs can be provided without the need for an in-person physical exam.  This service lets us provide the care you need with a video conversation.  If a prescription is necessary we can send it directly to your pharmacy.  If lab work is needed we can place an order for that and you can then stop by our lab to have the test done at a later time.     Video visits are billed at different rates depending on your insurance coverage.  Please reach out to your insurance provider with any questions.     If during the course of the call the physician/provider feels a video visit is not appropriate, you will not be charged for this service.\"     Patient has given verbal consent for Video visit? Yes        Video-Visit Details  Type of service:  Video Visit     Video Start Time:0330  Video End Time: 0345    Originating Location (pt. Location): Home     Distant Location (provider location):  [facility]        Oncology/Hematology Visit Note  May 17, 2021    Reason for Visit: follow up of bladder cancer    Michaela Dorman is a 70 year old female with a   The bladder cancer was invading the muscle, stage cT2. She is s/p TURBT and PET/CT 4/8/2021 showed likely residual tumor in the left trigone. She started neoadjuvant chemotherapy with cisplatin (split dose) 4/20. Her Cr was 0.9. 4/27 her Cr was 1.9. Cycle 2 she received carbo/gem 5/4 and plan is surgery on 6/1/2021 (radical cystectomy with ileal conduit).     history of stage 2 [place] negative HER2 positive breast cancer s/p neoadjuvant chemo, bilateral mastectomy 2/8/2017 with pCR who has completed a year of herceptin and then was diagnosed with bladder cancer 2/22/2021.         Patient seen today for mucositis      Interval History:  Patient reports last week she had chemotherapy " therapeutic noticed mouth discomfort this Saturday.  She is unsure if she has mouth sores or not she is able to take some Ensure and liquids.  She does have hard time chewing food now, she reports no problems with drinking liquids ,denies dysphagia  Denies fever,chills, sweats cough shortness of breath chest pain nausea vomiting diarrhea abdominal pain, denies urinary symptoms bleeding, denies fatigue  Patient denies being in any kind of distress      Review of Systems:  14 point ROS of systems including Constitutional, Eyes, Respiratory, Cardiovascular, Gastroenterology, Genitourinary, Integumentary, Muscularskeletal, Psychiatric were all negative except for pertinent positives noted in my HPI.        Physical Examination:  Video visit exam -Since this is a video visit it is hard to properly examine patient mouth       Laboratory Data:  No labs done today      Assessment and Plan:  This is a 70-year-old female with    High-grade muscle invasive bladder cancer  Post TURBT with residual disease on PET scan  04/20-started cycle 1 day 1 Cisplatin and Gemzar.  Complicated by acute renal failure  Cisplatin discontinued  05/04-cycle 2 Cisplatin switched to to Carboplatin /Gemzar  Cycle 2-day 8 given on 5/12   Plan for surgery on 6/1/2021 (radical cystectomy with ileal conduit). Patient has follow-up appointment with Dr. Ferguson  after the surgery to discuss wound care/adjuvant treatment  -Patient complains of mucositis-please see below plan   schedule with me in the next 2 to 3 days        Mucositis secondary to chemotherapy  Start oncology Magic mouthwash  Schedule for 1 L NS bolus hydration  Schedule for CBC and CMP  Plan for Neupogen daily for 3 days if neutropenic  Advised patient to check temperature frequently . Go to ER in the event of worsening mucositis, fever, chills, sweats , difficulty swallowing or any changes in health condition      History of acute kidney injury secondary to cisplatin  Is been followed by  nephrology  05/12-creatinine nicely down at 1.05  -Recheck CMP      History of breast cancer  Continue follow-up with Dr. Ferguson       I Informed patient that she is at high risk for infection/sepsis advised patient to check temperature at least once a day and frequently as needed  Advised patient to go to the ER in the event of fever chills sweats cough shortness of breath weakness worsening of mucositis, difficulty swallowing or any changes in health condition         LISA Foley [facility] [place]     Chart documentation with Dragon Voice recognition Software. Although reviewed after completion, some words and grammatical errors may remain.

## 2021-05-17 NOTE — LETTER
"    5/17/2021         RE: Michaela Dorman  [address]  [place] [address]        Dear Colleague,    Thank you for referring your patient, Michaela Dorman, to the [facility]. Please see a copy of my visit note below.    Michaela is a 70 year old who is being evaluated via a billable video visit.      How would you like to obtain your AVS? MyChart  If the video visit is dropped, the invitation should be resent by: Text to cell phone: [phone]  Will anyone else be joining your video visit? No      Video Start Time:   Video-Visit Details    Type of service:  Video Visit    Video End Time:    Originating Location (pt. Location): Home    Distant Location (provider location):  [facility]     Platform used for Video Visit: [employer] text at [phone]    rx magic mouthwash       The patient has been notified of following:      \"This video visit will be conducted via a call between you and your physician/provider. We have found that certain health care needs can be provided without the need for an in-person physical exam.  This service lets us provide the care you need with a video conversation.  If a prescription is necessary we can send it directly to your pharmacy.  If lab work is needed we can place an order for that and you can then stop by our lab to have the test done at a later time.     Video visits are billed at different rates depending on your insurance coverage.  Please reach out to your insurance provider with any questions.     If during the course of the call the physician/provider feels a video visit is not appropriate, you will not be charged for this service.\"     Patient has given verbal consent for Video visit? Yes        Video-Visit Details  Type of service:  Video Visit     Video Start Time:0330  Video End Time: 0345    Originating Location (pt. Location): Home     Distant Location (provider location):  [facility]        Oncology/Hematology Visit Note  May 17, 2021    Reason for Visit: follow up of bladder cancer    Michaela Dorman is a 70 year old female with a   The bladder cancer was invading the muscle, stage cT2. She is s/p TURBT and PET/CT 4/8/2021 showed likely residual tumor in the left trigone. She started neoadjuvant chemotherapy with cisplatin (split dose) 4/20. Her Cr was 0.9. 4/27 her Cr was 1.9. Cycle 2 she received carbo/gem 5/4 and plan is surgery on 6/1/2021 (radical cystectomy with ileal conduit).     history of stage 2 [place] negative HER2 positive breast cancer s/p neoadjuvant chemo, bilateral mastectomy 2/8/2017 with pCR who has completed a year of herceptin and then was diagnosed with bladder cancer 2/22/2021.         Patient seen today for mucositis      Interval History:  Patient reports last week she had chemotherapy therapeutic noticed mouth discomfort this Saturday.  She is unsure if she has mouth sores or not she is able to take some Ensure and liquids.  She does have hard time chewing food now, she reports no problems with drinking liquids ,denies dysphagia  Denies fever chills sweats cough shortness of breath chest pain nausea vomiting diarrhea abdominal pain bleeding, denies fatigue        Review of Systems:  14 point ROS of systems including Constitutional, Eyes, Respiratory, Cardiovascular, Gastroenterology, Genitourinary, Integumentary, Muscularskeletal, Psychiatric were all negative except for pertinent positives noted in my HPI.        Physical Examination:  Video visit exam -Since this is a video visit it is hard to properly examine patient mouth       Laboratory Data:  No labs done today      Assessment and Plan:  This is a 70-year-old female with    High-grade muscle invasive bladder cancer  Post TURBT with residual disease on PET scan  04/20-started cycle 1 day 1 Cisplatin and Gemzar.  Complicated by acute renal failure  Cisplatin discontinued  05/04-cycle 2 to change to  Carboplatin /Gemzar  Cycle 2-day 8 given on 5/12   surgery on 6/1/2021 (radical cystectomy with ileal conduit).   -Patient complains of mucositis  -Order labs CBC and CMP    schedule with me in the next 2 to 3 days  Patient has follow-up appointment with Dr. Ferguson  after the surgery to discuss wound care/adjuvant treatment      Mucositis secondary to treatment  Start oncology Magic mouthwash  Schedule for 1 L NS bolus hydration  Schedule for CBC and CMP  Plan for Neupogen daily for 3 days if neutropenic  Advised patient to check temperature frequently . Go to ER in the event of worsening mucositis, fever, chills, sweats , difficulty swallowing or any changes in health condition      History of acute kidney injury secondary to cisplatin  Is been followed by nephrology  05/12-creatinine nicely down at 1.05  -Recheck CMP      History of breast cancer  Continue follow-up with Dr. Ferguson       I Informed patient that she is at high risk for infection/sepsis advised patient to check temperature at least once a day and frequently as needed  Advised patient to go to the ER in the event of fever chills sweats cough shortness of breath weakness worsening of mucositis, difficulty swallowing or any changes in health condition         LISA Foley CNP  [facility]     Chart documentation with Dragon Voice recognition Software. Although reviewed after completion, some words and grammatical errors may remain.            Again, thank you for allowing me to participate in the care of your patient.        Sincerely,        LISA Foley CNP

## 2021-05-17 NOTE — PROGRESS NOTES
Michaela is a 70 year old who is being evaluated via a billable video visit.      How would you like to obtain your AVS? MyChart  If the video visit is dropped, the invitation should be resent by: Text to cell phone: [phone]  Will anyone else be joining your video visit? No      Video Start Time:   Video-Visit Details    Type of service:  Video Visit    Video End Time:    Originating Location (pt. Location): Home    Distant Location (provider location):  [facility]     Platform used for Video Visit: [employer] text at [phone]    rx magic mouthwash

## 2021-05-17 NOTE — LETTER
"    5/17/2021         RE: Michaela Dorman  [address]  [place] [address]        Dear Colleague,    Thank you for referring your patient, Michaela Dorman, to the [facility]. Please see a copy of my visit note below.    Michaela is a 70 year old who is being evaluated via a billable video visit.      How would you like to obtain your AVS? MyChart  If the video visit is dropped, the invitation should be resent by: Text to cell phone: [phone]  Will anyone else be joining your video visit? No      Video Start Time:   Video-Visit Details    Type of service:  Video Visit    Video End Time:    Originating Location (pt. Location): Home    Distant Location (provider location):  [facility]     Platform used for Video Visit: [employer] text at [phone]    rx magic mouthwash       The patient has been notified of following:      \"This video visit will be conducted via a call between you and your physician/provider. We have found that certain health care needs can be provided without the need for an in-person physical exam.  This service lets us provide the care you need with a video conversation.  If a prescription is necessary we can send it directly to your pharmacy.  If lab work is needed we can place an order for that and you can then stop by our lab to have the test done at a later time.     Video visits are billed at different rates depending on your insurance coverage.  Please reach out to your insurance provider with any questions.     If during the course of the call the physician/provider feels a video visit is not appropriate, you will not be charged for this service.\"     Patient has given verbal consent for Video visit? Yes        Video-Visit Details  Type of service:  Video Visit     Video Start Time:0330  Video End Time: 0345    Originating Location (pt. Location): Home     Distant Location (provider location):  [facility]        Oncology/Hematology Visit Note  May 17, 2021    Reason for Visit: follow up of bladder cancer    Michaela Dorman is a 70 year old female with a   The bladder cancer was invading the muscle, stage cT2. She is s/p TURBT and PET/CT 4/8/2021 showed likely residual tumor in the left trigone. She started neoadjuvant chemotherapy with cisplatin (split dose) 4/20. Her Cr was 0.9. 4/27 her Cr was 1.9. Cycle 2 she received carbo/gem 5/4 and plan is surgery on 6/1/2021 (radical cystectomy with ileal conduit).     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         Patient seen today for mucositis      Interval History:  Patient reports last week she had chemotherapy therapeutic noticed mouth discomfort this Saturday.  She is unsure if she has mouth sores or not she is able to take some Ensure and liquids.  She does have hard time chewing food now, she reports no problems with drinking liquids ,denies dysphagia  Denies fever chills sweats cough shortness of breath chest pain nausea vomiting diarrhea abdominal pain bleeding, denies fatigue        Review of Systems:  14 point ROS of systems including Constitutional, Eyes, Respiratory, Cardiovascular, Gastroenterology, Genitourinary, Integumentary, Muscularskeletal, Psychiatric were all negative except for pertinent positives noted in my HPI.        Physical Examination:  Video visit exam -Since this is a video visit it is hard to properly examine patient mouth       Laboratory Data:  No labs done today      Assessment and Plan:  This is a 70-year-old female with    High-grade muscle invasive bladder cancer  Post TURBT with residual disease on PET scan  04/20-started cycle 1 day 1 Cisplatin and Gemzar.  Complicated by acute renal failure  Cisplatin discontinued  05/04-cycle 2 to change to  Carboplatin /Gemzar  Cycle 2-day 8 given on 5/12   surgery on 6/1/2021 (radical cystectomy with ileal conduit).   -Patient complains of mucositis  -Order labs CBC and CMP    schedule with me in the next 2 to 3 days  Patient has follow-up appointment with Dr. Ferguson  after the surgery to discuss wound care/adjuvant treatment      Mucositis secondary to treatment  Start oncology Magic mouthwash  Schedule for 1 L NS bolus hydration  Schedule for CBC and CMP  Plan for Neupogen daily for 3 days if neutropenic  Advised patient to check temperature frequently . Go to ER in the event of worsening mucositis, fever, chills, sweats , difficulty swallowing or any changes in health condition      History of acute kidney injury secondary to cisplatin  Is been followed by nephrology  05/12-creatinine nicely down at 1.05  -Recheck CMP      History of breast cancer  Continue follow-up with Dr. Ferguson       I Informed patient that she is at high risk for infection/sepsis advised patient to check temperature at least once a day and frequently as needed  Advised patient to go to the ER in the event of fever chills sweats cough shortness of breath weakness worsening of mucositis, difficulty swallowing or any changes in health condition         LISA Foley CNP  [facility] [place]     Chart documentation with Dragon Voice recognition Software. Although reviewed after completion, some words and grammatical errors may remain.            Again, thank you for allowing me to participate in the care of your patient.        Sincerely,        LISA Foley CNP

## 2021-05-18 ENCOUNTER — ANCILLARY PROCEDURE (OUTPATIENT)
Dept: ULTRASOUND IMAGING | Facility: CLINIC | Age: 71
End: 2021-05-18
Attending: UROLOGY
Payer: COMMERCIAL

## 2021-05-18 ENCOUNTER — PRE VISIT (OUTPATIENT)
Dept: SURGERY | Facility: CLINIC | Age: 71
End: 2021-05-18

## 2021-05-18 ENCOUNTER — APPOINTMENT (OUTPATIENT)
Dept: UROLOGY | Facility: CLINIC | Age: 71
End: 2021-05-18
Payer: COMMERCIAL

## 2021-05-18 ENCOUNTER — OFFICE VISIT (OUTPATIENT)
Dept: SURGERY | Facility: CLINIC | Age: 71
End: 2021-05-18
Payer: COMMERCIAL

## 2021-05-18 ENCOUNTER — ANESTHESIA EVENT (OUTPATIENT)
Dept: SURGERY | Facility: CLINIC | Age: 71
DRG: 655 | End: 2021-05-18
Payer: MEDICARE

## 2021-05-18 ENCOUNTER — OFFICE VISIT (OUTPATIENT)
Dept: WOUND CARE | Facility: CLINIC | Age: 71
End: 2021-05-18
Payer: COMMERCIAL

## 2021-05-18 VITALS
TEMPERATURE: 98.1 F | HEIGHT: 66 IN | DIASTOLIC BLOOD PRESSURE: 78 MMHG | SYSTOLIC BLOOD PRESSURE: 126 MMHG | RESPIRATION RATE: 16 BRPM | BODY MASS INDEX: 22.18 KG/M2 | WEIGHT: 138 LBS | HEART RATE: 74 BPM

## 2021-05-18 DIAGNOSIS — Z01.818 PREOP EXAMINATION: Primary | ICD-10-CM

## 2021-05-18 DIAGNOSIS — C50.412 MALIGNANT NEOPLASM OF UPPER-OUTER QUADRANT OF LEFT FEMALE BREAST, UNSPECIFIED ESTROGEN RECEPTOR STATUS (H): ICD-10-CM

## 2021-05-18 DIAGNOSIS — R60.9 EDEMA: ICD-10-CM

## 2021-05-18 DIAGNOSIS — C68.9 UROTHELIAL CANCER (H): ICD-10-CM

## 2021-05-18 DIAGNOSIS — C67.9 UROTHELIAL CARCINOMA OF BLADDER WITH INVASION OF MUSCLE (H): ICD-10-CM

## 2021-05-18 LAB
ALBUMIN SERPL-MCNC: 4 G/DL (ref 3.4–5)
ALP SERPL-CCNC: 66 U/L (ref 40–150)
ALT SERPL W P-5'-P-CCNC: 53 U/L (ref 0–50)
ANION GAP SERPL CALCULATED.3IONS-SCNC: 5 MMOL/L (ref 3–14)
AST SERPL W P-5'-P-CCNC: 24 U/L (ref 0–45)
BILIRUB SERPL-MCNC: 0.1 MG/DL (ref 0.2–1.3)
BUN SERPL-MCNC: 31 MG/DL (ref 7–30)
CALCIUM SERPL-MCNC: 9.5 MG/DL (ref 8.5–10.1)
CHLORIDE SERPL-SCNC: 107 MMOL/L (ref 94–109)
CO2 SERPL-SCNC: 27 MMOL/L (ref 20–32)
CREAT SERPL-MCNC: 1.19 MG/DL (ref 0.52–1.04)
DIFFERENTIAL METHOD BLD: ABNORMAL
EOSINOPHIL # BLD AUTO: 0 10E9/L (ref 0–0.7)
EOSINOPHIL NFR BLD AUTO: 1 %
ERYTHROCYTE [DISTWIDTH] IN BLOOD BY AUTOMATED COUNT: 11.9 % (ref 10–15)
GFR SERPL CREATININE-BSD FRML MDRD: 46 ML/MIN/{1.73_M2}
GLUCOSE SERPL-MCNC: 85 MG/DL (ref 70–99)
HCT VFR BLD AUTO: 28.1 % (ref 35–47)
HGB BLD-MCNC: 9.4 G/DL (ref 11.7–15.7)
LYMPHOCYTES # BLD AUTO: 1.4 10E9/L (ref 0.8–5.3)
LYMPHOCYTES NFR BLD AUTO: 58 %
MCH RBC QN AUTO: 33.5 PG (ref 26.5–33)
MCHC RBC AUTO-ENTMCNC: 33.5 G/DL (ref 31.5–36.5)
MCV RBC AUTO: 100 FL (ref 78–100)
MONOCYTES # BLD AUTO: 0.1 10E9/L (ref 0–1.3)
MONOCYTES NFR BLD AUTO: 3 %
NEUTROPHILS # BLD AUTO: 0.9 10E9/L (ref 1.6–8.3)
NEUTROPHILS NFR BLD AUTO: 38 %
PLATELET # BLD AUTO: 43 10E9/L (ref 150–450)
POTASSIUM SERPL-SCNC: 4.5 MMOL/L (ref 3.4–5.3)
PROT SERPL-MCNC: 7.5 G/DL (ref 6.8–8.8)
RBC # BLD AUTO: 2.81 10E12/L (ref 3.8–5.2)
SODIUM SERPL-SCNC: 139 MMOL/L (ref 133–144)
WBC # BLD AUTO: 2.4 10E9/L (ref 4–11)

## 2021-05-18 PROCEDURE — 99203 OFFICE O/P NEW LOW 30 MIN: CPT | Performed by: CLINICAL NURSE SPECIALIST

## 2021-05-18 PROCEDURE — 99207 PR PREOP VISIT IN GLOBAL PKG: CPT

## 2021-05-18 PROCEDURE — 86850 RBC ANTIBODY SCREEN: CPT | Performed by: PATHOLOGY

## 2021-05-18 PROCEDURE — 80053 COMPREHEN METABOLIC PANEL: CPT | Performed by: PATHOLOGY

## 2021-05-18 PROCEDURE — 86900 BLOOD TYPING SEROLOGIC ABO: CPT | Performed by: PATHOLOGY

## 2021-05-18 PROCEDURE — 36415 COLL VENOUS BLD VENIPUNCTURE: CPT | Performed by: PATHOLOGY

## 2021-05-18 PROCEDURE — 85025 COMPLETE CBC W/AUTO DIFF WBC: CPT | Performed by: PATHOLOGY

## 2021-05-18 PROCEDURE — 93970 EXTREMITY STUDY: CPT | Mod: GC | Performed by: RADIOLOGY

## 2021-05-18 PROCEDURE — 86901 BLOOD TYPING SEROLOGIC RH(D): CPT | Performed by: PATHOLOGY

## 2021-05-18 RX ORDER — ACETAMINOPHEN 325 MG/1
975 TABLET ORAL ONCE
Status: CANCELLED | OUTPATIENT
Start: 2021-05-18 | End: 2021-05-18

## 2021-05-18 RX ORDER — GABAPENTIN 300 MG/1
300 CAPSULE ORAL ONCE
Status: CANCELLED | OUTPATIENT
Start: 2021-05-18 | End: 2021-05-18

## 2021-05-18 ASSESSMENT — PAIN SCALES - GENERAL: PAINLEVEL: MODERATE PAIN (5)

## 2021-05-18 ASSESSMENT — LIFESTYLE VARIABLES: TOBACCO_USE: 1

## 2021-05-18 ASSESSMENT — MIFFLIN-ST. JEOR: SCORE: 1157.71

## 2021-05-18 NOTE — H&P (VIEW-ONLY)
Pre-Operative H & P     CC:  Preoperative exam to assess for increased cardiopulmonary risk while undergoing surgery and anesthesia.    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 Robles on 6/1/21 at [facility]. History is obtained from the patient and medical records.     Patient who is followed by Dr. Robles and Oncology with history of bladder cancer, invading the muscle. She is s/p TURBT on 3/18/21. A PET/CT scan on 4/8/21 showed likely residual tumor in the left trigone. Chemotherapy was initiated, complicated by acute kidney injury. She received her last dose on 5/12/21 and was recommended to proceed to surgery since her chemotherapy was being poorly tolerated. She is now preparing for above procedure.     Today she reports that her Oncology team is concerned about her counts and she is going to see her provider tomorrow for a recheck and possibly to receive fluids or some other therapies. She is generally feeling well. She reports being treated for thrush.    Her history is otherwise significant for HLD, HTN, carotid stenosis s/p left CEA, GERD, spinal stenosis, s/p back surgery and s/p right reverse shoulder, depression. and anxiety. She also has history of stage 2 ER/TN negative HER2 positive breast cancer s/p neoadjuvant chemotherapy, bilateral mastectomy 2/8/2017 who has completed a year of herceptin and then was diagnosed with above bladder cancer 2/22/2021.        Past Medical History  Past Medical History:   Diagnosis Date     Acute kidney injury (H)      Carotid stenosis, bilateral L80%, R40% 09/02/2020     Central stenosis of spinal canal 12/01/2017    lumbar      DDD (degenerative  disc disease), lumbar 12/01/2017     Depressive disorder, not elsewhere classified      Esophageal reflux      ETOH abuse     in remission     Foraminal stenosis of lumbar region 12/01/2017    Complete lumbar spine left at various degrees and to a lesser extent the right as well.     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     Subdural hematoma (H)      Urothelial carcinoma (H)        Past Surgical History  Past Surgical History:   Procedure Laterality Date     ARTHROPLASTY SHOULDER Right 11/17/2015     ARTHROSCOPY KNEE  6/7/2012    Procedure:ARTHROSCOPY KNEE; right knee arthroscopy with partial lateral menisectomy; Surgeon:DAMIÁN MCALLISTER; Location:[facility]     C LIGATE FALLOPIAN TUBE       COLONOSCOPY N/A 12/22/2017    Procedure: COLONOSCOPY;  colonoscopy;  Surgeon: Chuck Chand MD;  Location: [facility]     CYSTOSCOPY, RETROGRADES, COMBINED Bilateral 3/18/2021    Procedure: CYSTOSCOPY, WITH RETROGRADE PYELOGRAM;  Surgeon: Girish Jack MD;  Location: [facility]     CYSTOSCOPY, TRANSURETHRAL RESECTION (TUR) TUMOR BLADDER, COMBINED N/A 3/18/2021    Procedure: CYSTOSCOPY, WITH TRANSURETHRAL RESECTION BLADDER TUMOR;  Surgeon: Girish Jack MD;  Location: [facility]     ENDARTERECTOMY CAROTID Left 10/8/2020    Procedure: LEFT CAROTID ENDARTERECTOMY WITH EEG;  Surgeon: Lacho Jasmine MD;  Location: [facility]     EXCISE MASS AXILLA Left 9/16/2016    Procedure: EXCISE MASS AXILLA;  Surgeon: Keyon Blanco MD;  Location: [facility]     HC REMV CATARACT EXTRACAP,INSERT LENS, W/O ECP  6/25/2009    Right eye     INJECT EPIDURAL LUMBAR N/A 4/11/2019    Procedure: interlaminar epidual steroid injection lumbar 4-5;  Surgeon: Oskar Salvador MD;  Location: [facility]     INJECT EPIDURAL LUMBAR Bilateral 9/12/2019    Procedure: lumbar 4-5  epidural interlaminar steroid injection;  Surgeon: Oskar Salvador MD;  Location: [facility]     INSERT PORT VASCULAR ACCESS Right 10/7/2016    Procedure: INSERT PORT VASCULAR ACCESS;  Surgeon: Keyon Blanco MD;  Location: [facility]     IR CHEST PORT PLACEMENT > 5 YRS OF AGE  4/16/2021     MASTECTOMY SIMPLE BILATERAL Bilateral 2/8/2017    Procedure: MASTECTOMY SIMPLE BILATERAL;  Surgeon: Keyon Blanco MD;  [facility]: [facility]     OPEN REDUCTION INTERNAL FIXATION FOOT Right 3/20/2015    Procedure: OPEN REDUCTION INTERNAL FIXATION FOOT;  Surgeon: Javi Herrmann DPM;  Location: [facility]     OPTICAL TRACKING SYSTEM FUSION SPINE POSTERIOR LUMBAR TWO LEVELS N/A 2/4/2020    Procedure: LUMBAR 2-SACRAL1 TRANSFORMINAL INTERBODY FUSION;  Surgeon: Lenny Gomes MD;  Location: [facility]     REMOVE PORT VASCULAR ACCESS Right 2/14/2018    Procedure: REMOVE PORT VASCULAR ACCESS;  right intra jugular port removal;  Surgeon: Keyon Blanco MD;  Location: [facility]     SHOULDER SURGERY Right 11/2015     ZZC NONSPECIFIC PROCEDURE  1956    ankle fracture surgery       Hx of Blood transfusions/reactions: Denies.      Hx of abnormal bleeding or anti-platelet use: ASA 81 mg daily.     Menstrual history: No LMP recorded. Patient is postmenopausal.     Steroid use in the last year: Yes, with chemotherapy.    Personal or FH with difficulty with Anesthesia:  Denies.     Prior to Admission Medications  Current Outpatient Medications   Medication Sig Dispense Refill     acetaminophen (TYLENOL) 500 MG tablet Take 1,000 mg by mouth 3 times daily as needed for mild pain (500MG X 2 = 1,000MG)       ALPRAZolam (XANAX) 0.5 MG tablet TAKE 1 TABLET BY MOUTH DAILY AT BEDTIME AS NEEDED FOR SLEEP 30 tablet 0     aspirin (ASA) 81 MG chewable tablet Take 1 tablet (81 mg) by mouth daily 100 tablet 3     atorvastatin (LIPITOR) 20 MG tablet Take 1 tablet (20 mg) by mouth daily 90 tablet 1     carvedilol (COREG) 6.25 MG tablet Take 1 tablet (6.25 mg) by mouth 2  times daily (with meals) 60 tablet 11     escitalopram (LEXAPRO) 10 MG tablet TAKE 1 TABLET BY MOUTH EVERY DAY WITH 20MG TABLETS (Patient taking differently: Take 10 mg by mouth daily Takes with 20 mg tablet for total daily dose of 30 mg in the morning.) 90 tablet 1     escitalopram (LEXAPRO) 20 MG tablet TAKE 1 TABLET BY MOUTH EVERY DAY WITH 10MG TABLETS (Patient taking differently: Take 20 mg by mouth daily Takes with 20 mg tablet for total daily dose of 30 mg in the morning.) 90 tablet 1     LORazepam (ATIVAN) 0.5 MG tablet Take 1 tablet (0.5 mg) by mouth every 4 hours as needed (Anxiety, Nausea/Vomiting or Sleep) 30 tablet 3     omeprazole (PRILOSEC) 20 MG DR capsule Take 20 mg by mouth daily       ondansetron (ZOFRAN-ODT) 4 MG ODT tab Take 1 tablet (4 mg) by mouth every 8 hours as needed for nausea 30 tablet 0     oxyCODONE-acetaminophen (PERCOCET) 5-325 MG tablet Take 0.5-1 tablets by mouth every 6 hours as needed for severe pain        polyethylene glycol (MIRALAX/GLYCOLAX) powder Take 17 g by mouth every morning        prochlorperazine (COMPAZINE) 10 MG tablet Take 0.5 tablets (5 mg) by mouth every 6 hours as needed (Nausea/Vomiting) 30 tablet 3     senna-docusate (SENOKOT-S/PERICOLACE) 8.6-50 MG tablet Take 2 tablets by mouth 2 times daily (Patient taking differently: Take 2 tablets by mouth 2 times daily as needed for constipation ) 60 tablet 0     magic mouthwash suspension, diphenhydrAMINE, lidocaine, aluminum-magnesium & simethicone, (FIRST-MOUTHWASH BLM) compounding kit Swish and swallow 5-10 mLs in mouth every 6 hours as needed for mouth sores 500 mL 0       Allergies  Allergies   Allergen Reactions     Oxybutynin      Patient reports symptoms of nausea and mind fogginess       Social History  Social History     Socioeconomic History     Marital status:      Spouse name: ozzie     Number of children: 5     Years of education: Not on file     Highest education level: Not on file   Occupational  History     Employer: [employer]OMEM[employer]ER   Social Needs     Financial resource strain: Not on file     Food insecurity     Worry: Not on file     Inability: Not on file     Transportation needs     Medical: Not on file     Non-medical: Not on file   Tobacco Use     Smoking status: Former Smoker     Packs/day: 3.00     Years: 10.00     Pack years: 30.00     Types: Cigarettes     Quit date: 1979     Years since quittin.4     Smokeless tobacco: Never Used     Tobacco comment: approx. 3 packs daily   Substance and Sexual Activity     Alcohol use: Yes     Alcohol/week: 0.0 standard drinks     Comment: daily glass of wine     Drug use: No     Sexual activity: Yes     Partners: Male   Lifestyle     Physical activity     Days per week: Not on file     Minutes per session: Not on file     Stress: Not on file   Relationships     Social connections     Talks on phone: Not on file     Gets together: Not on file     Attends Restoration service: Not on file     Active member of club or organization: Not on file     Attends meetings of clubs or organizations: Not on file     Relationship status: Not on file     Intimate partner violence     Fear of current or ex partner: Not on file     Emotionally abused: Not on file     Physically abused: Not on file     Forced sexual activity: Not on file   Other Topics Concern      Service Not Asked     Blood Transfusions Not Asked     Caffeine Concern No     Occupational Exposure Not Asked     Hobby Hazards No     Sleep Concern No     Stress Concern Yes     Weight Concern Not Asked     Special Diet Not Asked     Back Care Not Asked     Exercise Yes     Bike Helmet Not Asked     Seat Belt Yes     Self-Exams Not Asked     Parent/sibling w/ CABG, MI or angioplasty before 65F 55M? Not Asked   Social History Narrative     Not on file       Family History  Family History   Problem Relation Age of Onset     Hypertension Mother      Thyroid Disease Mother      Cerebrovascular Disease Mother   "    Hypertension Father      Cerebrovascular Disease Father      Cancer Father         skin     Thyroid Disease Sister      Diabetes Brother         type 2     Depression Sister      Breast Cancer Sister         age 47     Cancer Sister         skin     Cancer Brother         inside nose       ROS/MED HISTORY  The complete review of systems is negative other than noted in the HPI or here.    ENT/Pulmonary: Comment: Being treated for thrush    (+) tobacco use, Past use,     Neurologic:  - neg neurologic ROS     Cardiovascular:     (+) Dyslipidemia hypertension-----Previous cardiac testing  (-) taking anticoagulants/antiplatelets   METS/Exercise Tolerance:     Hematologic:  - neg hematologic  ROS     Musculoskeletal: Comment: Back pain after history of back surgery      GI/Hepatic:     (+) GERD, Asymptomatic on medication,     Renal/Genitourinary:     (+) renal disease, type: ARF and CRI, Pt does not require dialysis,     Endo:  - neg endo ROS     Psychiatric/Substance Use:     (+) psychiatric history anxiety     Infectious Disease:  - neg infectious disease ROS     Malignancy:   (+) Malignancy, History of Breast and Other.Breast CA Remission status post Surgery, Chemo and Radiation.  Other CA bladder  Active status post Chemo.    Other:  - neg other ROS          Temp: 98.1  F (36.7  C) Temp src: Oral BP: 126/78 Pulse: 74   Resp: 16           138 lbs 0 oz  5' 6\"   Body mass index is 22.27 kg/m .       Physical Exam  Constitutional: Awake, alert, cooperative, no apparent distress, and appears stated age.  Eyes: Pupils equal, round and reactive to light, extra ocular muscles intact, sclera clear, conjunctiva normal.  HENT: Normocephalic, oral pharynx with moist mucus membranes, some teeth missing. Tongue does not appear reddened or irritated, but is painful to patient. No goiter appreciated.   Respiratory: Clear to auscultation bilaterally, no crackles or wheezing. No cough or obvious dyspnea.  Cardiovascular: Regular " rate and rhythm, normal S1 and S2, and no murmur noted.  Carotids +2, no bruits. No edema. Palpable pulses to radial  DP and PT arteries.   GI: Normal bowel sounds, soft, non-distended, non-tender, no masses palpated, no hepatosplenomegaly.    Lymph/Hematologic: No cervical lymphadenopathy and no supraclavicular lymphadenopathy.  Genitourinary: Deferred.   Skin: Warm and dry.  No rashes at anticipated surgical site. Right upper chest port.  Musculoskeletal: Full ROM of neck. There is no redness, warmth, or swelling of the joints. Gross motor strength is normal.    Neurologic: Awake, alert, oriented to name, place and time. Cranial nerves II-XII are grossly intact. Gait is normal.   Neuropsychiatric: Calm, cooperative. Normal affect.     Labs: (personally reviewed)  Lab Results   Component Value Date    WBC 2.4 05/18/2021     Lab Results   Component Value Date    RBC 2.81 05/18/2021     Lab Results   Component Value Date    HGB 9.4 05/18/2021     Lab Results   Component Value Date    HCT 28.1 05/18/2021     Lab Results   Component Value Date     05/18/2021     Lab Results   Component Value Date    MCH 33.5 05/18/2021     Lab Results   Component Value Date    MCHC 33.5 05/18/2021     Lab Results   Component Value Date    RDW 11.9 05/18/2021     Lab Results   Component Value Date    PLT 43 05/18/2021     Last Comprehensive Metabolic Panel:  Sodium   Date Value Ref Range Status   05/18/2021 139 133 - 144 mmol/L Final     Potassium   Date Value Ref Range Status   05/18/2021 4.5 3.4 - 5.3 mmol/L Final     Chloride   Date Value Ref Range Status   05/18/2021 107 94 - 109 mmol/L Final     Carbon Dioxide   Date Value Ref Range Status   05/18/2021 27 20 - 32 mmol/L Final     Anion Gap   Date Value Ref Range Status   05/18/2021 5 3 - 14 mmol/L Final     Glucose   Date Value Ref Range Status   05/18/2021 85 70 - 99 mg/dL Final     Urea Nitrogen   Date Value Ref Range Status   05/18/2021 31 (H) 7 - 30 mg/dL Final      Creatinine   Date Value Ref Range Status   05/18/2021 1.19 (H) 0.52 - 1.04 mg/dL Final     GFR Estimate   Date Value Ref Range Status   05/18/2021 46 (L) >60 mL/min/[1.73_m2] Final     Comment:     Non  GFR Calc  Starting 12/18/2018, serum creatinine based estimated GFR (eGFR) will be   calculated using the Chronic Kidney Disease Epidemiology Collaboration   (CKD-EPI) equation.       Calcium   Date Value Ref Range Status   05/18/2021 9.5 8.5 - 10.1 mg/dL Final     Lab Results   Component Value Date    AST 24 05/18/2021     Lab Results   Component Value Date    ALT 53 05/18/2021     Lab Results   Component Value Date    BILICONJ 0.0 03/27/2008      Lab Results   Component Value Date    BILITOTAL 0.1 05/18/2021     Lab Results   Component Value Date    ALBUMIN 4.0 05/18/2021     Lab Results   Component Value Date    PROTTOTAL 7.5 05/18/2021      Lab Results   Component Value Date    ALKPHOS 66 05/18/2021     EKG: Personally reviewed 10/8/20 Sinus rhythm, nonspecific T wave abnormality    1/12/21 Carotid US, left                                                             IMPRESSION:      1. Left carotid: Postop carotid endarterectomy. Less than 50% ICA    stenosis.     CTA Head/Neck 8/31/20                                                            IMPRESSION:     CTA Head:     1. No evidence of large-vessel occlusion or high-grade stenosis.    2. Left internal carotid artery is asymmetrically small compared to    the left.    3. Incidental 2 mm outpouching off the cavernous segment of the right    internal carotid artery which could represent infundibulum or    aneurysm. Follow-up could be performed in 6-12 months.     CTA Neck:     1. Severe plaquing at the bilateral carotid bifurcations.    2. Approximately 80% stenosis of the proximal left internal carotid    artery.     3. Approximately 40% stenosis of the proximal right internal carotid    artery.    2017 MUGA                                                           IMPRESSION:    1. Normal left ventricular ejection fraction at 52.1%.    2. Normal chamber size and wall motion     4/8/21 PET scan                                                                       IMPRESSION:     1. No evidence of metastatic disease in the chest, abdomen or pelvis.    2. Postprocedural changes of TRUBT with a 2.2 x 0.9 cm enhancing mass    along the left trigone, representing residual malignancy. FDG avidity    in this lesion cannot be determined due to overlying radiotracer    excretion (even post Lasix).      2a. A second focal area of mild hypermetabolism and trace enhancement    along the anterior bladder just left of midline, indeterminant may    represent a second focus of disease or     postprocedural inflammation.          3. Sub-5 mm solid pulmonary nodules as described above, stable since    2017.         3/24/21 CT CAP                                                                    IMPRESSION:     1. There is mild irregularity of the posterior urinary bladder wall    which could be the site of the patient's malignancy. Urinary bladder    is otherwise unremarkable. There is a benign normal variant of    bilateral fetal renal lobulation. No other urinary system abnormality    is seen.    2. No definite metastases are identified.    3. Tiny nodular density in the medial right minor fissure measuring    less than 0.3 cm is of doubtful clinical significance.          Recommendations for one or multiple incidental lung nodules < 6mm :      Low risk patients: No routine follow-up.      High risk patients: Optional follow-up CT at 12 months; if    unchanged, no further follow-up.      4. Mild irregularity of the cecum wall could represent a normal    ileocecal valve although an infiltrative process in the wall of the    cecum is difficult to entirely excluded in appropriate setting.    Colonoscopy is recommended if not recently performed. Moderate amount    of  stool is otherwise seen throughout the colon.    5. At least moderate atherosclerosis including coronary arteries,    aorta, and major arteries of the abdomen and pelvis as described    above.    6. No other significant abnormalities are identified.    Imaging reviewed by this provider    Outside records reviewed from: Care Everywhere    ASSESSMENT and PLAN  Michaela Dorman is a 70 year old female scheduled to undergo CYSTECTOMY, RADICAL, WITH ILEAL CONDUIT CREATION with Dr. Robles on 6/1/21. She has the following specific operative considerations:   - RCRI : 0.9% risk of major adverse cardiac event.   - Anesthesia considerations:  Refer to PAC assessment in anesthesia records  - VTE risk: 3%  - ANTON # of risks 2/8 = Low risk  - Risk of PONV score = 3.  If > 2, anti-emetic intervention recommended. 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 Above procedure now planned.   --Oncology team following her counts closely with follow up tomorrow. Possible fluids or other therapies planned per patient. WBC 2.4, Hgb 9.4, hematocrit 28.1, platelets 43. Will have platelets available for DOS.   --History of breast cancer s/p neoadjuvant chemotherapy, bilateral mastectomy 2/8/2017 and completion of one year of herceptin before bladder cancer diagnosis.    --HLD. Will take atorvastatin on DOS. HYPERTENSION. Will take Coreg on DOS. No other cardiac history or symptoms. EKG and MUGA scan above. Good activity tolerance.   --Carotid stenosis s/p left CEA, followed by Vascular Surgery and considered stable. CTA neck with approximately 40% stenosis of the proximal right internal carotid artery.   --Former smoker. Denies pulmonary symptoms.   --GERD. Will take omeprazole on DOS.   --Cr 1.19.   --Spinal stenosis with history of back surgery and persistent pain. Oxycodone prn and typically takes  1/2 tab on DOS.   --Right side reverse shoulder in 2015. Will require careful positioning.   --Depression/anxiety. Xanax nightly for sleep. Will take Lexapro on DOS.   --Denies history of blood transfusion. Type and screen drawn today.   --Enhanced recovery pathway initiated.     Arrival time, NPO, shower and medication instructions provided by nursing staff today.    Patient was reviewed by Dr. Nielson.    LISA Obando CNS  Preoperative Assessment Center  [facility]  Phone: [phone]  Fax: [phone]

## 2021-05-18 NOTE — PROGRESS NOTES
C Preoperative Ostomy Consult    Referral from Dr. Robles  Consult attended by patient and spouse and daughter oJan  Diagnosis: Bladder cancer Date of Surgery: 06/01/2021   Type of Surgery: CYSTECTOMY, RADICAL, WITH ILEAL CONDUIT CREATION  Emotional readiness for surgery: no acute distress  Physical Limitations: Without limitations  Abdomen assessed for site by: Patient's ability to visiualize area, avoidance of skin creases and scars, palpating for rectus abdominus muscle and clothing fit  Teaching: Anatomy/picture of stoma, stoma/bowel function postop, intro to pouches, returning to work/lifting, written/media offered and role of WOC/postop followup explained.  Stoma site marking:  Marking pen and tegaderm   Location chosen: [facility]  [employer]'s Ostomy packet given to patient  Sue Hendrix NP was available for supervision of care if needed or if questions should arise and regarding plan of care.  Jovana Mann RN RN [facility]

## 2021-05-18 NOTE — ANESTHESIA PREPROCEDURE EVALUATION
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     Lumbar radiculopathy 2017     Personal history of malignant neoplasm of breast      Prophylactic antibiotic for dental procedure indicated due to prior joint replacement 2017     S/P reverse total shoulder arthroplasty, right 2017     Subdural hematoma (H)      Urothelial carcinoma (H)       Past Surgical History:   Procedure Laterality Date     ARTHROPLASTY SHOULDER Right 2015     ARTHROSCOPY KNEE  2012    Procedure:ARTHROSCOPY KNEE; right knee arthroscopy with partial lateral menisectomy; Surgeon:DAMIÁN MCALLISTER; Location:[facility]     C LIGATE FALLOPIAN TUBE       COLONOSCOPY N/A 2017    Procedure: COLONOSCOPY;  colonoscopy;  Surgeon: Chuck Chand MD;  Location: [facility]     CYSTOSCOPY, RETROGRADES, COMBINED Bilateral 3/18/2021    Procedure: CYSTOSCOPY, WITH RETROGRADE PYELOGRAM;  Surgeon: Girish Jack MD;  Location: [facility]     CYSTOSCOPY, TRANSURETHRAL RESECTION (TUR) TUMOR BLADDER, COMBINED N/A 3/18/2021    Procedure: CYSTOSCOPY, WITH TRANSURETHRAL RESECTION BLADDER TUMOR;  Surgeon: Girish Jack MD;  Location: [facility]     ENDARTERECTOMY CAROTID Left 10/8/2020    Procedure: LEFT CAROTID ENDARTERECTOMY WITH EEG;  Surgeon:  Lacho Jasmine MD;  Location: [facility]     EXCISE MASS AXILLA Left 2016    Procedure: EXCISE MASS AXILLA;  Surgeon: Keyon Blanco MD;  Location: [facility]     HC REMV CATARACT EXTRACAP,INSERT LENS, W/O ECP  2009    Right eye     INJECT EPIDURAL LUMBAR N/A 2019    Procedure: interlaminar epidual steroid injection lumbar 4-5;  Surgeon: Oskar Salvador MD;  Location: [facility]     INJECT EPIDURAL LUMBAR Bilateral 2019    Procedure: lumbar 4-5 epidural interlaminar steroid injection;  Surgeon: Oskar Salvador MD;  Location: [facility]     INSERT PORT VASCULAR ACCESS Right 10/7/2016    Procedure: INSERT PORT VASCULAR ACCESS;  Surgeon: Keyon Blanco MD;  Location: [facility]     IR CHEST PORT PLACEMENT > 5 YRS OF AGE  2021     MASTECTOMY SIMPLE BILATERAL Bilateral 2017    Procedure: MASTECTOMY SIMPLE BILATERAL;  Surgeon: Keyon Blanco MD;  [facility]     OPEN REDUCTION INTERNAL FIXATION FOOT Right 3/20/2015    Procedure: OPEN REDUCTION INTERNAL FIXATION FOOT;  Surgeon: Javi Herrmann DPM;  Location: [facility]     OPTICAL TRACKING SYSTEM FUSION SPINE POSTERIOR LUMBAR TWO LEVELS N/A 2020    Procedure: LUMBAR 2-SACRAL1 TRANSFORMINAL INTERBODY FUSION;  Surgeon: Lenny Gomes MD;  Location: [facility]     REMOVE PORT VASCULAR ACCESS Right 2018    Procedure: REMOVE PORT VASCULAR ACCESS;  right intra jugular port removal;  Surgeon: Keyon Blanco MD;  Location: [facility]     SHOULDER SURGERY Right 2015     Z NONSPECIFIC PROCEDURE      ankle fracture surgery      Allergies   Allergen Reactions     Oxybutynin      Patient reports symptoms of nausea and mind fogginess      Social History     Tobacco Use     Smoking status: Former Smoker     Packs/day: 3.00     Years: 10.00     Pack years: 30.00     Types: Cigarettes     Quit date: 1979     Years since quittin.4     Smokeless tobacco: Never Used     Tobacco comment: approx. 3 packs daily   Substance Use Topics     Alcohol  use: Yes     Alcohol/week: 0.0 standard drinks     Comment: daily glass of wine      Wt Readings from Last 1 Encounters:   05/18/21 62.6 kg (138 lb)        Anesthesia Evaluation   Pt has had prior anesthetic. Type: General and MAC.    No history of anesthetic complications       ROS/MED HX  ENT/Pulmonary: Comment: Being treated for thrush    (+) tobacco use, Past use,     Neurologic:  - neg neurologic ROS     Cardiovascular:     (+) Dyslipidemia hypertension-----Previous cardiac testing   Echo: Date: Results:    Stress Test: Date: 2017 Results:  Negative nuclear stress test  ECG Reviewed: Date: 10/8/20 Results:  SR, nonspecific T wave abnormality  Cath: Date: Results:   (-) taking anticoagulants/antiplatelets   METS/Exercise Tolerance: >4 METS    Hematologic:  - neg hematologic  ROS     Musculoskeletal: Comment: Back pain after history of back surgery      GI/Hepatic:     (+) GERD, Asymptomatic on medication,     Renal/Genitourinary:     (+) renal disease, type: ARF and CRI, Pt does not require dialysis,     Endo:  - neg endo ROS     Psychiatric/Substance Use:     (+) psychiatric history anxiety     Infectious Disease:  - neg infectious disease ROS     Malignancy:   (+) Malignancy, History of Breast and Other.Breast CA Remission status post Surgery, Chemo and Radiation.  Other CA bladder  Active status post Chemo.    Other:  - neg other ROS          Physical Exam    Airway        Mallampati: II   TM distance: > 3 FB   Neck ROM: full   Mouth opening: > 3 cm    Respiratory Devices and Support         Dental       (+) missing      Cardiovascular          Rhythm and rate: regular and normal     Pulmonary           breath sounds clear to auscultation           OUTSIDE LABS:  CBC:   Lab Results   Component Value Date    WBC 3.6 (L) 05/12/2021    WBC 5.7 05/04/2021    HGB 9.1 (L) 05/12/2021    HGB 9.6 (L) 05/04/2021    HCT 28.3 (L) 05/12/2021    HCT 31.1 (L) 05/04/2021     05/12/2021     05/04/2021     BMP:    Lab Results   Component Value Date     05/12/2021     05/05/2021    POTASSIUM 4.5 05/12/2021    POTASSIUM 4.3 05/05/2021    CHLORIDE 108 05/12/2021    CHLORIDE 106 05/05/2021    CO2 29 05/12/2021    CO2 25 05/05/2021    BUN 27 05/12/2021    BUN 31 (H) 05/05/2021    CR 1.05 (H) 05/12/2021    CR 1.26 (H) 05/05/2021    GLC 86 05/12/2021     (H) 05/05/2021     COAGS:   Lab Results   Component Value Date    PTT 27 05/14/2008    INR 0.91 04/16/2021     POC:   Lab Results   Component Value Date    BGM 91 02/11/2020     HEPATIC:   Lab Results   Component Value Date    ALBUMIN 4.0 05/12/2021    PROTTOTAL 7.3 05/12/2021    ALT 48 05/12/2021    AST 25 05/12/2021    GGT 43 (H) 10/06/2011    ALKPHOS 59 05/12/2021    BILITOTAL 0.2 05/12/2021     OTHER:   Lab Results   Component Value Date    LACT 1.1 02/09/2020    A1C 5.2 10/08/2020    VANDANA 9.3 05/12/2021    PHOS 3.0 05/03/2021    MAG 1.9 05/12/2021    TSH 1.34 01/24/2017    SED 9 07/16/2008       Anesthesia Plan    ASA Status:  3      Anesthesia Type: General.     - Airway: ETT   Induction: Intravenous.   Maintenance: Inhalation.   Techniques and Equipment:     - Lines/Monitors: 2nd IV, Arterial Line     - Blood: PLT, PRBC     Consents    Anesthesia Plan(s) and associated risks, benefits, and realistic alternatives discussed. Questions answered and patient/representative(s) expressed understanding.     - Discussed with:  Patient      - Extended Intubation/Ventilatory Support Discussed: No.      - Patient is DNR/DNI Status: No    Use of blood products discussed: Yes.     - Discussed with: Patient.     - Consented: consented to blood products            Reason for refusal: other.     Postoperative Care    Pain management: IV analgesics, Peripheral nerve block (Single Shot), Multi-modal analgesia.   PONV prophylaxis: Ondansetron (or other 5HT-3), Dexamethasone or Solumedrol     Comments:    Ms. Dorman is a 70 year old lady with a medical history of chemotherapy  related pancytopenia who presents for cystectomy with ileal conduit, plan for arterial line for frequent blood gas sampling and monitoring of hemodynamics and 2 large bore IV's.          PAC Discussion and Assessment    ASA Classification: 3  Case is suitable for: [place]  Anesthetic techniques and relevant risks discussed: GA  Invasive monitoring and risk discussed: No    Possibility and Risk of blood transfusion discussed: Yes            PAC Resident/NP Anesthesia Assessment: Michaela Dorman is a 70 year old female scheduled to undergo CYSTECTOMY, RADICAL, WITH ILEAL CONDUIT CREATION with Dr. Robles on 6/1/21. She has the following specific operative considerations:   - RCRI : 0.9% risk of major adverse cardiac event.   - VTE risk: 3%  - ANTON # of risks 2/8 = Low risk  - Risk of PONV score = 3.  If > 2, anti-emetic intervention recommended. 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 Above procedure now planned.   --Oncology team following her counts closely with follow up tomorrow. Possible fluids or other therapies planned per patient. WBC 2.4, Hgb 9.4, hematocrit 28.1, platelets 43. Will have platelets available for DOS.   --History of breast cancer s/p neoadjuvant chemotherapy, bilateral mastectomy 2/8/2017 and completion of one year of herceptin before bladder cancer diagnosis.    --HLD. Will take atorvastatin on DOS. HYPERTENSION. Will take Coreg on DOS. No other cardiac history or symptoms. EKG and MUGA scan above. Good activity tolerance.   --Carotid stenosis s/p left CEA, followed by Vascular Surgery and considered stable. CTA neck with approximately 40% stenosis of the proximal right internal carotid artery.   --Former smoker. Denies pulmonary symptoms.   --GERD. Will take omeprazole on DOS.   --Cr 1.19.   --Spinal stenosis with history of back surgery and  persistent pain. Oxycodone prn and typically takes 1/2 tab on DOS.   --Right side reverse shoulder in 2015. Will require careful positioning.   --Depression/anxiety. Xanax nightly for sleep. Will take Lexapro on DOS.  --Denies history of blood transfusion. Type and screen drawn today.   --Enhanced recovery pathway initiated.       Patient was reviewed by Dr. Nielson.    Reviewed and Signed by PAC Mid-Level Provider/Resident  Mid-Level Provider/Resident: LISA Cummings, CNS  Date: 5/18/21  Time: 4:58pm                               LISA Obando CNS

## 2021-05-18 NOTE — H&P
Pre-Operative H & P     CC:  Preoperative exam to assess for increased cardiopulmonary risk while undergoing surgery and anesthesia.    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 Robles on 6/1/21 at [facility]. History is obtained from the patient and medical records.     Patient who is followed by Dr. Robles and Oncology with history of bladder cancer, invading the muscle. She is s/p TURBT on 3/18/21. A PET/CT scan on 4/8/21 showed likely residual tumor in the left trigone. Chemotherapy was initiated, complicated by acute kidney injury. She received her last dose on 5/12/21 and was recommended to proceed to surgery since her chemotherapy was being poorly tolerated. She is now preparing for above procedure.     Today she reports that her Oncology team is concerned about her counts and she is going to see her provider tomorrow for a recheck and possibly to receive fluids or some other therapies. She is generally feeling well. She reports being treated for thrush.    Her history is otherwise significant for HLD, HTN, carotid stenosis s/p left CEA, GERD, spinal stenosis, s/p back surgery and s/p right reverse shoulder, depression. and anxiety. She also has history of stage 2 ER/MA negative HER2 positive breast cancer s/p neoadjuvant chemotherapy, bilateral mastectomy 2/8/2017 who has completed a year of herceptin and then was diagnosed with above bladder cancer 2/22/2021.        Past Medical History  Past Medical History:   Diagnosis Date     Acute kidney injury (H)      Carotid stenosis, bilateral L80%, R40% 09/02/2020     Central stenosis of spinal canal 12/01/2017    lumbar      DDD (degenerative  disc disease), lumbar 12/01/2017     Depressive disorder, not elsewhere classified      Esophageal reflux      ETOH abuse     in remission     Foraminal stenosis of lumbar region 12/01/2017    Complete lumbar spine left at various degrees and to a lesser extent the right as well.     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     Subdural hematoma (H)      Urothelial carcinoma (H)        Past Surgical History  Past Surgical History:   Procedure Laterality Date     ARTHROPLASTY SHOULDER Right 11/17/2015     ARTHROSCOPY KNEE  6/7/2012    Procedure:ARTHROSCOPY KNEE; right knee arthroscopy with partial lateral menisectomy; Surgeon:DAMIÁN MCALLISTER; Location:[facility]     C LIGATE FALLOPIAN TUBE       COLONOSCOPY N/A 12/22/2017    Procedure: COLONOSCOPY;  colonoscopy;  Surgeon: Chuck Chand MD;  Location: [facility]     CYSTOSCOPY, RETROGRADES, COMBINED Bilateral 3/18/2021    Procedure: CYSTOSCOPY, WITH RETROGRADE PYELOGRAM;  Surgeon: Girish Jack MD;  Location: [facility]     CYSTOSCOPY, TRANSURETHRAL RESECTION (TUR) TUMOR BLADDER, COMBINED N/A 3/18/2021    Procedure: CYSTOSCOPY, WITH TRANSURETHRAL RESECTION BLADDER TUMOR;  Surgeon: Girish Jack MD;  Location: [facility]     ENDARTERECTOMY CAROTID Left 10/8/2020    Procedure: LEFT CAROTID ENDARTERECTOMY WITH EEG;  Surgeon: Lacho Jasmine MD;  Location: [facility] OR     EXCISE MASS AXILLA Left 9/16/2016    Procedure: EXCISE MASS AXILLA;  Surgeon: Keyon Blanco MD;  Location: [facility]     HC REMV CATARACT EXTRACAP,INSERT LENS, W/O ECP  6/25/2009    Right eye     INJECT EPIDURAL LUMBAR N/A 4/11/2019    Procedure: interlaminar epidual steroid injection lumbar 4-5;  Surgeon: Oskar Salvador MD;  Location: [facility]     INJECT EPIDURAL LUMBAR Bilateral 9/12/2019    Procedure: lumbar 4-5  epidural interlaminar steroid injection;  Surgeon: Oskar Salvador MD;  Location: [facility]     INSERT PORT VASCULAR ACCESS Right 10/7/2016    Procedure: INSERT PORT VASCULAR ACCESS;  Surgeon: Keyon Blanco MD;  Location: [facility]     IR CHEST PORT PLACEMENT > 5 YRS OF AGE  4/16/2021     MASTECTOMY SIMPLE BILATERAL Bilateral 2/8/2017    Procedure: MASTECTOMY SIMPLE BILATERAL;  Surgeon: Keyon Blanco MD;  [facility]: [facility]     OPEN REDUCTION INTERNAL FIXATION FOOT Right 3/20/2015    Procedure: OPEN REDUCTION INTERNAL FIXATION FOOT;  Surgeon: Javi Herrmann DPM;  Location: [facility]     OPTICAL TRACKING SYSTEM FUSION SPINE POSTERIOR LUMBAR TWO LEVELS N/A 2/4/2020    Procedure: LUMBAR 2-SACRAL1 TRANSFORMINAL INTERBODY FUSION;  Surgeon: Lenny Gomes MD;  Location: [facility]     REMOVE PORT VASCULAR ACCESS Right 2/14/2018    Procedure: REMOVE PORT VASCULAR ACCESS;  right intra jugular port removal;  Surgeon: Keyon Blanco MD;  Location: [facility]     SHOULDER SURGERY Right 11/2015     ZZC NONSPECIFIC PROCEDURE  1956    ankle fracture surgery       Hx of Blood transfusions/reactions: Denies.      Hx of abnormal bleeding or anti-platelet use: ASA 81 mg daily.     Menstrual history: No LMP recorded. Patient is postmenopausal.     Steroid use in the last year: Yes, with chemotherapy.    Personal or FH with difficulty with Anesthesia:  Denies.     Prior to Admission Medications  Current Outpatient Medications   Medication Sig Dispense Refill     acetaminophen (TYLENOL) 500 MG tablet Take 1,000 mg by mouth 3 times daily as needed for mild pain (500MG X 2 = 1,000MG)       ALPRAZolam (XANAX) 0.5 MG tablet TAKE 1 TABLET BY MOUTH DAILY AT BEDTIME AS NEEDED FOR SLEEP 30 tablet 0     aspirin (ASA) 81 MG chewable tablet Take 1 tablet (81 mg) by mouth daily 100 tablet 3     atorvastatin (LIPITOR) 20 MG tablet Take 1 tablet (20 mg) by mouth daily 90 tablet 1     carvedilol (COREG) 6.25 MG tablet Take 1 tablet (6.25 mg) by mouth 2  times daily (with meals) 60 tablet 11     escitalopram (LEXAPRO) 10 MG tablet TAKE 1 TABLET BY MOUTH EVERY DAY WITH 20MG TABLETS (Patient taking differently: Take 10 mg by mouth daily Takes with 20 mg tablet for total daily dose of 30 mg in the morning.) 90 tablet 1     escitalopram (LEXAPRO) 20 MG tablet TAKE 1 TABLET BY MOUTH EVERY DAY WITH 10MG TABLETS (Patient taking differently: Take 20 mg by mouth daily Takes with 20 mg tablet for total daily dose of 30 mg in the morning.) 90 tablet 1     LORazepam (ATIVAN) 0.5 MG tablet Take 1 tablet (0.5 mg) by mouth every 4 hours as needed (Anxiety, Nausea/Vomiting or Sleep) 30 tablet 3     omeprazole (PRILOSEC) 20 MG DR capsule Take 20 mg by mouth daily       ondansetron (ZOFRAN-ODT) 4 MG ODT tab Take 1 tablet (4 mg) by mouth every 8 hours as needed for nausea 30 tablet 0     oxyCODONE-acetaminophen (PERCOCET) 5-325 MG tablet Take 0.5-1 tablets by mouth every 6 hours as needed for severe pain        polyethylene glycol (MIRALAX/GLYCOLAX) powder Take 17 g by mouth every morning        prochlorperazine (COMPAZINE) 10 MG tablet Take 0.5 tablets (5 mg) by mouth every 6 hours as needed (Nausea/Vomiting) 30 tablet 3     senna-docusate (SENOKOT-S/PERICOLACE) 8.6-50 MG tablet Take 2 tablets by mouth 2 times daily (Patient taking differently: Take 2 tablets by mouth 2 times daily as needed for constipation ) 60 tablet 0     magic mouthwash suspension, diphenhydrAMINE, lidocaine, aluminum-magnesium & simethicone, (FIRST-MOUTHWASH BLM) compounding kit Swish and swallow 5-10 mLs in mouth every 6 hours as needed for mouth sores 500 mL 0       Allergies  Allergies   Allergen Reactions     Oxybutynin      Patient reports symptoms of nausea and mind fogginess       Social History  Social History     Socioeconomic History     Marital status:      Spouse name: ozzie     Number of children: 5     Years of education: Not on file     Highest education level: Not on file   Occupational  History     Employer: [employer]OMEM[employer]ER   Social Needs     Financial resource strain: Not on file     Food insecurity     Worry: Not on file     Inability: Not on file     Transportation needs     Medical: Not on file     Non-medical: Not on file   Tobacco Use     Smoking status: Former Smoker     Packs/day: 3.00     Years: 10.00     Pack years: 30.00     Types: Cigarettes     Quit date: 1979     Years since quittin.4     Smokeless tobacco: Never Used     Tobacco comment: approx. 3 packs daily   Substance and Sexual Activity     Alcohol use: Yes     Alcohol/week: 0.0 standard drinks     Comment: daily glass of wine     Drug use: No     Sexual activity: Yes     Partners: Male   Lifestyle     Physical activity     Days per week: Not on file     Minutes per session: Not on file     Stress: Not on file   Relationships     Social connections     Talks on phone: Not on file     Gets together: Not on file     Attends Mandaen service: Not on file     Active member of club or organization: Not on file     Attends meetings of clubs or organizations: Not on file     Relationship status: Not on file     Intimate partner violence     Fear of current or ex partner: Not on file     Emotionally abused: Not on file     Physically abused: Not on file     Forced sexual activity: Not on file   Other Topics Concern      Service Not Asked     Blood Transfusions Not Asked     Caffeine Concern No     Occupational Exposure Not Asked     Hobby Hazards No     Sleep Concern No     Stress Concern Yes     Weight Concern Not Asked     Special Diet Not Asked     Back Care Not Asked     Exercise Yes     Bike Helmet Not Asked     Seat Belt Yes     Self-Exams Not Asked     Parent/sibling w/ CABG, MI or angioplasty before 65F 55M? Not Asked   Social History Narrative     Not on file       Family History  Family History   Problem Relation Age of Onset     Hypertension Mother      Thyroid Disease Mother      Cerebrovascular Disease Mother   "    Hypertension Father      Cerebrovascular Disease Father      Cancer Father         skin     Thyroid Disease Sister      Diabetes Brother         type 2     Depression Sister      Breast Cancer Sister         age 47     Cancer Sister         skin     Cancer Brother         inside nose       ROS/MED HISTORY  The complete review of systems is negative other than noted in the HPI or here.    ENT/Pulmonary: Comment: Being treated for thrush    (+) tobacco use, Past use,     Neurologic:  - neg neurologic ROS     Cardiovascular:     (+) Dyslipidemia hypertension-----Previous cardiac testing  (-) taking anticoagulants/antiplatelets   METS/Exercise Tolerance:     Hematologic:  - neg hematologic  ROS     Musculoskeletal: Comment: Back pain after history of back surgery      GI/Hepatic:     (+) GERD, Asymptomatic on medication,     Renal/Genitourinary:     (+) renal disease, type: ARF and CRI, Pt does not require dialysis,     Endo:  - neg endo ROS     Psychiatric/Substance Use:     (+) psychiatric history anxiety     Infectious Disease:  - neg infectious disease ROS     Malignancy:   (+) Malignancy, History of Breast and Other.Breast CA Remission status post Surgery, Chemo and Radiation.  Other CA bladder  Active status post Chemo.    Other:  - neg other ROS          Temp: 98.1  F (36.7  C) Temp src: Oral BP: 126/78 Pulse: 74   Resp: 16           138 lbs 0 oz  5' 6\"   Body mass index is 22.27 kg/m .       Physical Exam  Constitutional: Awake, alert, cooperative, no apparent distress, and appears stated age.  Eyes: Pupils equal, round and reactive to light, extra ocular muscles intact, sclera clear, conjunctiva normal.  HENT: Normocephalic, oral pharynx with moist mucus membranes, some teeth missing. Tongue does not appear reddened or irritated, but is painful to patient. No goiter appreciated.   Respiratory: Clear to auscultation bilaterally, no crackles or wheezing. No cough or obvious dyspnea.  Cardiovascular: Regular " rate and rhythm, normal S1 and S2, and no murmur noted.  Carotids +2, no bruits. No edema. Palpable pulses to radial  DP and PT arteries.   GI: Normal bowel sounds, soft, non-distended, non-tender, no masses palpated, no hepatosplenomegaly.    Lymph/Hematologic: No cervical lymphadenopathy and no supraclavicular lymphadenopathy.  Genitourinary: Deferred.   Skin: Warm and dry.  No rashes at anticipated surgical site. Right upper chest port.  Musculoskeletal: Full ROM of neck. There is no redness, warmth, or swelling of the joints. Gross motor strength is normal.    Neurologic: Awake, alert, oriented to name, place and time. Cranial nerves II-XII are grossly intact. Gait is normal.   Neuropsychiatric: Calm, cooperative. Normal affect.     Labs: (personally reviewed)  Lab Results   Component Value Date    WBC 2.4 05/18/2021     Lab Results   Component Value Date    RBC 2.81 05/18/2021     Lab Results   Component Value Date    HGB 9.4 05/18/2021     Lab Results   Component Value Date    HCT 28.1 05/18/2021     Lab Results   Component Value Date     05/18/2021     Lab Results   Component Value Date    MCH 33.5 05/18/2021     Lab Results   Component Value Date    MCHC 33.5 05/18/2021     Lab Results   Component Value Date    RDW 11.9 05/18/2021     Lab Results   Component Value Date    PLT 43 05/18/2021     Last Comprehensive Metabolic Panel:  Sodium   Date Value Ref Range Status   05/18/2021 139 133 - 144 mmol/L Final     Potassium   Date Value Ref Range Status   05/18/2021 4.5 3.4 - 5.3 mmol/L Final     Chloride   Date Value Ref Range Status   05/18/2021 107 94 - 109 mmol/L Final     Carbon Dioxide   Date Value Ref Range Status   05/18/2021 27 20 - 32 mmol/L Final     Anion Gap   Date Value Ref Range Status   05/18/2021 5 3 - 14 mmol/L Final     Glucose   Date Value Ref Range Status   05/18/2021 85 70 - 99 mg/dL Final     Urea Nitrogen   Date Value Ref Range Status   05/18/2021 31 (H) 7 - 30 mg/dL Final      Creatinine   Date Value Ref Range Status   05/18/2021 1.19 (H) 0.52 - 1.04 mg/dL Final     GFR Estimate   Date Value Ref Range Status   05/18/2021 46 (L) >60 mL/min/[1.73_m2] Final     Comment:     Non  GFR Calc  Starting 12/18/2018, serum creatinine based estimated GFR (eGFR) will be   calculated using the Chronic Kidney Disease Epidemiology Collaboration   (CKD-EPI) equation.       Calcium   Date Value Ref Range Status   05/18/2021 9.5 8.5 - 10.1 mg/dL Final     Lab Results   Component Value Date    AST 24 05/18/2021     Lab Results   Component Value Date    ALT 53 05/18/2021     Lab Results   Component Value Date    BILICONJ 0.0 03/27/2008      Lab Results   Component Value Date    BILITOTAL 0.1 05/18/2021     Lab Results   Component Value Date    ALBUMIN 4.0 05/18/2021     Lab Results   Component Value Date    PROTTOTAL 7.5 05/18/2021      Lab Results   Component Value Date    ALKPHOS 66 05/18/2021     EKG: Personally reviewed 10/8/20 Sinus rhythm, nonspecific T wave abnormality    1/12/21 Carotid US, left                                                             IMPRESSION:      1. Left carotid: Postop carotid endarterectomy. Less than 50% ICA    stenosis.     CTA Head/Neck 8/31/20                                                            IMPRESSION:     CTA Head:     1. No evidence of large-vessel occlusion or high-grade stenosis.    2. Left internal carotid artery is asymmetrically small compared to    the left.    3. Incidental 2 mm outpouching off the cavernous segment of the right    internal carotid artery which could represent infundibulum or    aneurysm. Follow-up could be performed in 6-12 months.     CTA Neck:     1. Severe plaquing at the bilateral carotid bifurcations.    2. Approximately 80% stenosis of the proximal left internal carotid    artery.     3. Approximately 40% stenosis of the proximal right internal carotid    artery.    2017 MUGA                                                           IMPRESSION:    1. Normal left ventricular ejection fraction at 52.1%.    2. Normal chamber size and wall motion     4/8/21 PET scan                                                                       IMPRESSION:     1. No evidence of metastatic disease in the chest, abdomen or pelvis.    2. Postprocedural changes of TRUBT with a 2.2 x 0.9 cm enhancing mass    along the left trigone, representing residual malignancy. FDG avidity    in this lesion cannot be determined due to overlying radiotracer    excretion (even post Lasix).      2a. A second focal area of mild hypermetabolism and trace enhancement    along the anterior bladder just left of midline, indeterminant may    represent a second focus of disease or     postprocedural inflammation.          3. Sub-5 mm solid pulmonary nodules as described above, stable since    2017.         3/24/21 CT CAP                                                                    IMPRESSION:     1. There is mild irregularity of the posterior urinary bladder wall    which could be the site of the patient's malignancy. Urinary bladder    is otherwise unremarkable. There is a benign normal variant of    bilateral fetal renal lobulation. No other urinary system abnormality    is seen.    2. No definite metastases are identified.    3. Tiny nodular density in the medial right minor fissure measuring    less than 0.3 cm is of doubtful clinical significance.          Recommendations for one or multiple incidental lung nodules < 6mm :      Low risk patients: No routine follow-up.      High risk patients: Optional follow-up CT at 12 months; if    unchanged, no further follow-up.      4. Mild irregularity of the cecum wall could represent a normal    ileocecal valve although an infiltrative process in the wall of the    cecum is difficult to entirely excluded in appropriate setting.    Colonoscopy is recommended if not recently performed. Moderate amount    of  stool is otherwise seen throughout the colon.    5. At least moderate atherosclerosis including coronary arteries,    aorta, and major arteries of the abdomen and pelvis as described    above.    6. No other significant abnormalities are identified.    Imaging reviewed by this provider    Outside records reviewed from: Care Everywhere    ASSESSMENT and PLAN  Michaela Dorman is a 70 year old female scheduled to undergo CYSTECTOMY, RADICAL, WITH ILEAL CONDUIT CREATION with Dr. Robles on 6/1/21. She has the following specific operative considerations:   - RCRI : 0.9% risk of major adverse cardiac event.   - Anesthesia considerations:  Refer to PAC assessment in anesthesia records  - VTE risk: 3%  - ANTON # of risks 2/8 = Low risk  - Risk of PONV score = 3.  If > 2, anti-emetic intervention recommended. 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 Above procedure now planned.   --Oncology team following her counts closely with follow up tomorrow. Possible fluids or other therapies planned per patient. WBC 2.4, Hgb 9.4, hematocrit 28.1, platelets 43. Will have platelets available for DOS.   --History of breast cancer s/p neoadjuvant chemotherapy, bilateral mastectomy 2/8/2017 and completion of one year of herceptin before bladder cancer diagnosis.    --HLD. Will take atorvastatin on DOS. HYPERTENSION. Will take Coreg on DOS. No other cardiac history or symptoms. EKG and MUGA scan above. Good activity tolerance.   --Carotid stenosis s/p left CEA, followed by Vascular Surgery and considered stable. CTA neck with approximately 40% stenosis of the proximal right internal carotid artery.   --Former smoker. Denies pulmonary symptoms.   --GERD. Will take omeprazole on DOS.   --Cr 1.19.   --Spinal stenosis with history of back surgery and persistent pain. Oxycodone prn and typically takes  1/2 tab on DOS.   --Right side reverse shoulder in 2015. Will require careful positioning.   --Depression/anxiety. Xanax nightly for sleep. Will take Lexapro on DOS.   --Denies history of blood transfusion. Type and screen drawn today.   --Enhanced recovery pathway initiated.     Arrival time, NPO, shower and medication instructions provided by nursing staff today.    Patient was reviewed by Dr. Nielson.    LISA Obando CNS  Preoperative Assessment Center  [facility]  Phone: [phone]  Fax: [phone]

## 2021-05-19 ENCOUNTER — DOCUMENTATION ONLY (OUTPATIENT)
Dept: ONCOLOGY | Facility: CLINIC | Age: 71
End: 2021-05-19

## 2021-05-19 ENCOUNTER — INFUSION THERAPY VISIT (OUTPATIENT)
Dept: INFUSION THERAPY | Facility: CLINIC | Age: 71
End: 2021-05-19
Attending: NURSE PRACTITIONER
Payer: MEDICARE

## 2021-05-19 VITALS
WEIGHT: 137.4 LBS | HEART RATE: 70 BPM | RESPIRATION RATE: 18 BRPM | BODY MASS INDEX: 22.18 KG/M2 | SYSTOLIC BLOOD PRESSURE: 137 MMHG | TEMPERATURE: 98.2 F | OXYGEN SATURATION: 97 % | DIASTOLIC BLOOD PRESSURE: 63 MMHG

## 2021-05-19 DIAGNOSIS — C50.412 MALIGNANT NEOPLASM OF UPPER-OUTER QUADRANT OF LEFT BREAST IN FEMALE, ESTROGEN RECEPTOR NEGATIVE (H): ICD-10-CM

## 2021-05-19 DIAGNOSIS — T45.1X5A CHEMOTHERAPY-INDUCED NEUTROPENIA (H): ICD-10-CM

## 2021-05-19 DIAGNOSIS — D69.6 THROMBOCYTOPENIA (H): ICD-10-CM

## 2021-05-19 DIAGNOSIS — Z17.1 MALIGNANT NEOPLASM OF UPPER-OUTER QUADRANT OF LEFT BREAST IN FEMALE, ESTROGEN RECEPTOR NEGATIVE (H): ICD-10-CM

## 2021-05-19 DIAGNOSIS — C67.9 UROTHELIAL CARCINOMA OF BLADDER WITH INVASION OF MUSCLE (H): Primary | ICD-10-CM

## 2021-05-19 DIAGNOSIS — D70.1 CHEMOTHERAPY-INDUCED NEUTROPENIA (H): ICD-10-CM

## 2021-05-19 LAB
ABO + RH BLD: NORMAL
ABO + RH BLD: NORMAL
BASOPHILS # BLD AUTO: 0 10E9/L (ref 0–0.2)
BASOPHILS NFR BLD AUTO: 0 %
BLD GP AB SCN SERPL QL: NORMAL
BLD PROD TYP BPU: NORMAL
BLD UNIT ID BPU: 0
BLOOD BANK CMNT PATIENT-IMP: NORMAL
BLOOD BANK CMNT PATIENT-IMP: NORMAL
BLOOD PRODUCT CODE: NORMAL
BPU ID: NORMAL
DIFFERENTIAL METHOD BLD: ABNORMAL
EOSINOPHIL # BLD AUTO: 0 10E9/L (ref 0–0.7)
EOSINOPHIL NFR BLD AUTO: 0 %
ERYTHROCYTE [DISTWIDTH] IN BLOOD BY AUTOMATED COUNT: 11.9 % (ref 10–15)
HCT VFR BLD AUTO: 27.3 % (ref 35–47)
HGB BLD-MCNC: 8.8 G/DL (ref 11.7–15.7)
LYMPHOCYTES # BLD AUTO: 1.2 10E9/L (ref 0.8–5.3)
LYMPHOCYTES NFR BLD AUTO: 71 %
MACROCYTES BLD QL SMEAR: PRESENT
MCH RBC QN AUTO: 33.5 PG (ref 26.5–33)
MCHC RBC AUTO-ENTMCNC: 32.2 G/DL (ref 31.5–36.5)
MCV RBC AUTO: 104 FL (ref 78–100)
MONOCYTES # BLD AUTO: 0 10E9/L (ref 0–1.3)
MONOCYTES NFR BLD AUTO: 0 %
NEUTROPHILS # BLD AUTO: 0.5 10E9/L (ref 1.6–8.3)
NEUTROPHILS NFR BLD AUTO: 29 %
PLATELET # BLD AUTO: 28 10E9/L (ref 150–450)
PLATELET # BLD EST: ABNORMAL 10*3/UL
RBC # BLD AUTO: 2.63 10E12/L (ref 3.8–5.2)
SPECIMEN EXP DATE BLD: NORMAL
TRANSFUSION STATUS PATIENT QL: NORMAL
TRANSFUSION STATUS PATIENT QL: NORMAL
WBC # BLD AUTO: 1.7 10E9/L (ref 4–11)

## 2021-05-19 PROCEDURE — 85025 COMPLETE CBC W/AUTO DIFF WBC: CPT | Performed by: INTERNAL MEDICINE

## 2021-05-19 PROCEDURE — 96372 THER/PROPH/DIAG INJ SC/IM: CPT | Mod: XS | Performed by: NURSE PRACTITIONER

## 2021-05-19 PROCEDURE — 96360 HYDRATION IV INFUSION INIT: CPT

## 2021-05-19 PROCEDURE — 36430 TRANSFUSION BLD/BLD COMPNT: CPT

## 2021-05-19 PROCEDURE — 258N000003 HC RX IP 258 OP 636: Performed by: NURSE PRACTITIONER

## 2021-05-19 PROCEDURE — 250N000011 HC RX IP 250 OP 636: Performed by: NURSE PRACTITIONER

## 2021-05-19 PROCEDURE — P9037 PLATE PHERES LEUKOREDU IRRAD: HCPCS | Performed by: NURSE PRACTITIONER

## 2021-05-19 PROCEDURE — 96361 HYDRATE IV INFUSION ADD-ON: CPT

## 2021-05-19 RX ORDER — HEPARIN SODIUM (PORCINE) LOCK FLUSH IV SOLN 100 UNIT/ML 100 UNIT/ML
5 SOLUTION INTRAVENOUS
Status: CANCELLED | OUTPATIENT
Start: 2021-05-19

## 2021-05-19 RX ORDER — HEPARIN SODIUM,PORCINE 10 UNIT/ML
5 VIAL (ML) INTRAVENOUS
Status: CANCELLED | OUTPATIENT
Start: 2021-05-19

## 2021-05-19 RX ORDER — HEPARIN SODIUM (PORCINE) LOCK FLUSH IV SOLN 100 UNIT/ML 100 UNIT/ML
500 SOLUTION INTRAVENOUS ONCE
Status: COMPLETED | OUTPATIENT
Start: 2021-05-19 | End: 2021-05-19

## 2021-05-19 RX ADMIN — FILGRASTIM 480 MCG: 480 INJECTION, SOLUTION INTRAVENOUS; SUBCUTANEOUS at 10:08

## 2021-05-19 RX ADMIN — SODIUM CHLORIDE, PRESERVATIVE FREE 250 ML: 5 INJECTION INTRAVENOUS at 11:52

## 2021-05-19 RX ADMIN — SODIUM CHLORIDE 1000 ML: 9 INJECTION, SOLUTION INTRAVENOUS at 08:35

## 2021-05-19 RX ADMIN — Medication 500 UNITS: at 13:08

## 2021-05-19 ASSESSMENT — PAIN SCALES - GENERAL: PAINLEVEL: MILD PAIN (3)

## 2021-05-19 NOTE — PROGRESS NOTES
CBC CMP results review  For neutropenia we will give as planned Neupogen shot daily for 3 days    For thrombocytopenia-patient will get 1 unit of platelets    Go to ER in the event of fever chills sweats cough bleeding fall injury or bruising    Recheck labs on Friday and follow-up appointment with me on Friday    LISA Foley CNP [facility]

## 2021-05-19 NOTE — PROGRESS NOTES
Infusion Nursing Note:  Michaela Dorman presents today for IVF/LABS.    Patient seen by provider today: No   present during visit today: Not Applicable.    Note: Talked w/Stanislav Guzman CNP. He wants PLT 1 unit today or tomorrow. Plan for IVF today possibly tomorrow. Neupogen x 3 days. Follow up video visit with him Friday. Will recheck CBC W/DIFF on 5/21. Patient updated and agrees. Denies any s/s bleeding or fever/chills. Reviewed neutropenic precautions and mouth sores. Handouts given to patient.      Patient  Did meet criteria for an asymptomatic covid-19 PCR test in infusion today. Patient declined the covid-19 test. Patient has had both covid vaccines. Will be having covid test 5/28 for upcoming surgery.     Intravenous Access:  Labs drawn without difficulty.  Implanted Port.    Treatment Conditions:  Lab Results   Component Value Date    HGB 8.8 05/19/2021     Lab Results   Component Value Date    WBC 1.7 05/19/2021      Lab Results   Component Value Date    ANEU 0.5 05/19/2021     Lab Results   Component Value Date    PLT 28 05/19/2021      Results reviewed, labs MET treatment parameters, ok to proceed with treatment.  Blood transfusion consent signed 5/19/21.      Post Infusion Assessment:  Patient tolerated infusion without incident.  Blood return noted pre and post infusion.  Site patent and intact, free from redness, edema or discomfort.  Access discontinued per protocol.  Lung sounds diminished pre/post transfusion.       Discharge Plan:   Discharge instructions reviewed with: Patient.  Patient discharged in stable condition accompanied by: self.  Departure Mode: Ambulatory.    Tami Knowles RN

## 2021-05-20 ENCOUNTER — INFUSION THERAPY VISIT (OUTPATIENT)
Dept: INFUSION THERAPY | Facility: CLINIC | Age: 71
End: 2021-05-20
Attending: NURSE PRACTITIONER
Payer: MEDICARE

## 2021-05-20 VITALS
BODY MASS INDEX: 22.07 KG/M2 | SYSTOLIC BLOOD PRESSURE: 151 MMHG | RESPIRATION RATE: 16 BRPM | TEMPERATURE: 98.7 F | HEIGHT: 67 IN | WEIGHT: 140.6 LBS | OXYGEN SATURATION: 96 % | HEART RATE: 69 BPM | DIASTOLIC BLOOD PRESSURE: 77 MMHG

## 2021-05-20 DIAGNOSIS — D69.6 THROMBOCYTOPENIA (H): ICD-10-CM

## 2021-05-20 DIAGNOSIS — T45.1X5A CHEMOTHERAPY-INDUCED NEUTROPENIA (H): Primary | ICD-10-CM

## 2021-05-20 DIAGNOSIS — C67.9 UROTHELIAL CARCINOMA OF BLADDER WITH INVASION OF MUSCLE (H): ICD-10-CM

## 2021-05-20 DIAGNOSIS — C50.412 MALIGNANT NEOPLASM OF UPPER-OUTER QUADRANT OF LEFT BREAST IN FEMALE, ESTROGEN RECEPTOR NEGATIVE (H): ICD-10-CM

## 2021-05-20 DIAGNOSIS — Z17.1 MALIGNANT NEOPLASM OF UPPER-OUTER QUADRANT OF LEFT BREAST IN FEMALE, ESTROGEN RECEPTOR NEGATIVE (H): ICD-10-CM

## 2021-05-20 DIAGNOSIS — D70.1 CHEMOTHERAPY-INDUCED NEUTROPENIA (H): Primary | ICD-10-CM

## 2021-05-20 PROCEDURE — 96360 HYDRATION IV INFUSION INIT: CPT

## 2021-05-20 PROCEDURE — 96372 THER/PROPH/DIAG INJ SC/IM: CPT | Performed by: NURSE PRACTITIONER

## 2021-05-20 PROCEDURE — 250N000011 HC RX IP 250 OP 636: Performed by: NURSE PRACTITIONER

## 2021-05-20 PROCEDURE — 258N000003 HC RX IP 258 OP 636: Performed by: NURSE PRACTITIONER

## 2021-05-20 RX ORDER — HEPARIN SODIUM (PORCINE) LOCK FLUSH IV SOLN 100 UNIT/ML 100 UNIT/ML
5 SOLUTION INTRAVENOUS
Status: CANCELLED | OUTPATIENT
Start: 2021-05-20

## 2021-05-20 RX ORDER — HEPARIN SODIUM,PORCINE 10 UNIT/ML
5 VIAL (ML) INTRAVENOUS
Status: CANCELLED | OUTPATIENT
Start: 2021-05-20

## 2021-05-20 RX ORDER — HEPARIN SODIUM (PORCINE) LOCK FLUSH IV SOLN 100 UNIT/ML 100 UNIT/ML
5 SOLUTION INTRAVENOUS
Status: DISCONTINUED | OUTPATIENT
Start: 2021-05-20 | End: 2021-05-20 | Stop reason: HOSPADM

## 2021-05-20 RX ADMIN — Medication 5 ML: at 09:52

## 2021-05-20 RX ADMIN — SODIUM CHLORIDE 1000 ML: 9 INJECTION, SOLUTION INTRAVENOUS at 08:48

## 2021-05-20 RX ADMIN — FILGRASTIM 480 MCG: 480 INJECTION, SOLUTION INTRAVENOUS; SUBCUTANEOUS at 09:07

## 2021-05-20 ASSESSMENT — PAIN SCALES - GENERAL: PAINLEVEL: NO PAIN (0)

## 2021-05-20 ASSESSMENT — MIFFLIN-ST. JEOR: SCORE: 1177.45

## 2021-05-20 NOTE — PATIENT INSTRUCTIONS
Pt to return on 05/21/21 for Port Labs/IVF/Neupogen #3 of 3. Copies of medication list and upcoming appointments given prior to discharge.

## 2021-05-20 NOTE — PROGRESS NOTES
Infusion Nursing Note:  Michaela Dorman presents today for IVF/Neupogen #2 of 3.    Patient seen by provider today: No   present during visit today: Not Applicable.    Note: Patient states she feels a little bit better today than yesterday. Did take the Claritin at home for the Neupogen and denies any bone pain or aching. Feels that mouth sores are better today than yesterday, doing the swish and spit med.    Tomorrow will have last Neupogen injection, poss IVF and port labs.      Patient  did meet criteria for an asymptomatic covid-19 PCR test in infusion today. Patient declined the covid-19 test.    Intravenous Access:  Implanted Port.    Treatment Conditions:  Not Applicable.      Post Infusion Assessment:  Patient tolerated infusion without incident.  Patient tolerated injection without incident.  Patient observed for 15 minutes post injection per protocol.  Blood return noted pre and post infusion.  Site patent and intact, free from redness, edema or discomfort.  No evidence of extravasations.  Access discontinued per protocol.       Discharge Plan:   Discharge instructions reviewed with: Patient.  Patient and/or family verbalized understanding of discharge instructions and all questions answered.  Patient discharged in stable condition accompanied by: self.  Departure Mode: Ambulatory.      Mirian Peralta RN

## 2021-05-21 ENCOUNTER — VIRTUAL VISIT (OUTPATIENT)
Dept: ONCOLOGY | Facility: CLINIC | Age: 71
End: 2021-05-21
Attending: NURSE PRACTITIONER
Payer: COMMERCIAL

## 2021-05-21 ENCOUNTER — INFUSION THERAPY VISIT (OUTPATIENT)
Dept: INFUSION THERAPY | Facility: CLINIC | Age: 71
End: 2021-05-21
Attending: INTERNAL MEDICINE
Payer: MEDICARE

## 2021-05-21 ENCOUNTER — INFUSION THERAPY VISIT (OUTPATIENT)
Dept: INFUSION THERAPY | Facility: CLINIC | Age: 71
End: 2021-05-21
Attending: NURSE PRACTITIONER
Payer: MEDICARE

## 2021-05-21 VITALS
WEIGHT: 141.7 LBS | TEMPERATURE: 98.8 F | OXYGEN SATURATION: 95 % | HEART RATE: 70 BPM | RESPIRATION RATE: 12 BRPM | DIASTOLIC BLOOD PRESSURE: 79 MMHG | SYSTOLIC BLOOD PRESSURE: 150 MMHG | BODY MASS INDEX: 22.53 KG/M2

## 2021-05-21 VITALS
DIASTOLIC BLOOD PRESSURE: 82 MMHG | TEMPERATURE: 98.8 F | OXYGEN SATURATION: 96 % | RESPIRATION RATE: 20 BRPM | SYSTOLIC BLOOD PRESSURE: 160 MMHG | HEART RATE: 72 BPM

## 2021-05-21 DIAGNOSIS — D63.0 ANEMIA IN NEOPLASTIC DISEASE: ICD-10-CM

## 2021-05-21 DIAGNOSIS — Z11.59 ENCOUNTER FOR SCREENING FOR OTHER VIRAL DISEASES: ICD-10-CM

## 2021-05-21 DIAGNOSIS — Z17.1 MALIGNANT NEOPLASM OF UPPER-OUTER QUADRANT OF LEFT BREAST IN FEMALE, ESTROGEN RECEPTOR NEGATIVE (H): ICD-10-CM

## 2021-05-21 DIAGNOSIS — D70.1 CHEMOTHERAPY-INDUCED NEUTROPENIA (H): ICD-10-CM

## 2021-05-21 DIAGNOSIS — C50.412 MALIGNANT NEOPLASM OF UPPER-OUTER QUADRANT OF LEFT BREAST IN FEMALE, ESTROGEN RECEPTOR NEGATIVE (H): ICD-10-CM

## 2021-05-21 DIAGNOSIS — C67.9 UROTHELIAL CARCINOMA OF BLADDER WITH INVASION OF MUSCLE (H): Primary | ICD-10-CM

## 2021-05-21 DIAGNOSIS — T45.1X5A CHEMOTHERAPY-INDUCED NEUTROPENIA (H): ICD-10-CM

## 2021-05-21 DIAGNOSIS — D63.0 ANEMIA IN NEOPLASTIC DISEASE: Primary | ICD-10-CM

## 2021-05-21 DIAGNOSIS — C67.9 UROTHELIAL CARCINOMA OF BLADDER WITH INVASION OF MUSCLE (H): ICD-10-CM

## 2021-05-21 LAB
ABO + RH BLD: NORMAL
ABO + RH BLD: NORMAL
ALBUMIN SERPL-MCNC: 3.5 G/DL (ref 3.4–5)
ALP SERPL-CCNC: 64 U/L (ref 40–150)
ALT SERPL W P-5'-P-CCNC: 38 U/L (ref 0–50)
ANION GAP SERPL CALCULATED.3IONS-SCNC: 2 MMOL/L (ref 3–14)
AST SERPL W P-5'-P-CCNC: 20 U/L (ref 0–45)
BASOPHILS # BLD AUTO: 0 10E9/L (ref 0–0.2)
BASOPHILS NFR BLD AUTO: 0 %
BILIRUB SERPL-MCNC: 0.2 MG/DL (ref 0.2–1.3)
BLD GP AB SCN SERPL QL: NORMAL
BLD PROD TYP BPU: NORMAL
BLD PROD TYP BPU: NORMAL
BLD UNIT ID BPU: 0
BLOOD BANK CMNT PATIENT-IMP: NORMAL
BLOOD PRODUCT CODE: NORMAL
BPU ID: NORMAL
BUN SERPL-MCNC: 16 MG/DL (ref 7–30)
CALCIUM SERPL-MCNC: 9 MG/DL (ref 8.5–10.1)
CHLORIDE SERPL-SCNC: 110 MMOL/L (ref 94–109)
CO2 SERPL-SCNC: 27 MMOL/L (ref 20–32)
CREAT SERPL-MCNC: 0.99 MG/DL (ref 0.52–1.04)
DIFFERENTIAL METHOD BLD: ABNORMAL
EOSINOPHIL # BLD AUTO: 0 10E9/L (ref 0–0.7)
EOSINOPHIL NFR BLD AUTO: 0 %
ERYTHROCYTE [DISTWIDTH] IN BLOOD BY AUTOMATED COUNT: 11.9 % (ref 10–15)
GFR SERPL CREATININE-BSD FRML MDRD: 57 ML/MIN/{1.73_M2}
GLUCOSE SERPL-MCNC: 76 MG/DL (ref 70–99)
HCT VFR BLD AUTO: 25 % (ref 35–47)
HGB BLD-MCNC: 8 G/DL (ref 11.7–15.7)
LYMPHOCYTES # BLD AUTO: 1.9 10E9/L (ref 0.8–5.3)
LYMPHOCYTES NFR BLD AUTO: 20 %
MACROCYTES BLD QL SMEAR: PRESENT
MCH RBC QN AUTO: 33.3 PG (ref 26.5–33)
MCHC RBC AUTO-ENTMCNC: 32 G/DL (ref 31.5–36.5)
MCV RBC AUTO: 104 FL (ref 78–100)
MONOCYTES # BLD AUTO: 0.2 10E9/L (ref 0–1.3)
MONOCYTES NFR BLD AUTO: 2 %
NEUTROPHILS # BLD AUTO: 7.3 10E9/L (ref 1.6–8.3)
NEUTROPHILS NFR BLD AUTO: 78 %
NUM BPU REQUESTED: 1
PLATELET # BLD AUTO: 59 10E9/L (ref 150–450)
PLATELET # BLD EST: ABNORMAL 10*3/UL
POTASSIUM SERPL-SCNC: 4.4 MMOL/L (ref 3.4–5.3)
PROT SERPL-MCNC: 6.7 G/DL (ref 6.8–8.8)
RBC # BLD AUTO: 2.4 10E12/L (ref 3.8–5.2)
SODIUM SERPL-SCNC: 139 MMOL/L (ref 133–144)
SPECIMEN EXP DATE BLD: NORMAL
TRANSFUSION STATUS PATIENT QL: NORMAL
TRANSFUSION STATUS PATIENT QL: NORMAL
WBC # BLD AUTO: 9.3 10E9/L (ref 4–11)

## 2021-05-21 PROCEDURE — 96372 THER/PROPH/DIAG INJ SC/IM: CPT | Performed by: NURSE PRACTITIONER

## 2021-05-21 PROCEDURE — 86850 RBC ANTIBODY SCREEN: CPT | Performed by: NURSE PRACTITIONER

## 2021-05-21 PROCEDURE — 80053 COMPREHEN METABOLIC PANEL: CPT | Performed by: NURSE PRACTITIONER

## 2021-05-21 PROCEDURE — 99214 OFFICE O/P EST MOD 30 MIN: CPT | Performed by: NURSE PRACTITIONER

## 2021-05-21 PROCEDURE — 86923 COMPATIBILITY TEST ELECTRIC: CPT | Performed by: NURSE PRACTITIONER

## 2021-05-21 PROCEDURE — P9016 RBC LEUKOCYTES REDUCED: HCPCS | Performed by: NURSE PRACTITIONER

## 2021-05-21 PROCEDURE — 250N000011 HC RX IP 250 OP 636: Performed by: INTERNAL MEDICINE

## 2021-05-21 PROCEDURE — 258N000003 HC RX IP 258 OP 636: Performed by: NURSE PRACTITIONER

## 2021-05-21 PROCEDURE — 36430 TRANSFUSION BLD/BLD COMPNT: CPT

## 2021-05-21 PROCEDURE — 85025 COMPLETE CBC W/AUTO DIFF WBC: CPT | Performed by: NURSE PRACTITIONER

## 2021-05-21 PROCEDURE — 250N000011 HC RX IP 250 OP 636: Performed by: NURSE PRACTITIONER

## 2021-05-21 PROCEDURE — 86901 BLOOD TYPING SEROLOGIC RH(D): CPT | Performed by: NURSE PRACTITIONER

## 2021-05-21 PROCEDURE — 86900 BLOOD TYPING SEROLOGIC ABO: CPT | Performed by: NURSE PRACTITIONER

## 2021-05-21 PROCEDURE — 96360 HYDRATION IV INFUSION INIT: CPT

## 2021-05-21 RX ORDER — HEPARIN SODIUM (PORCINE) LOCK FLUSH IV SOLN 100 UNIT/ML 100 UNIT/ML
5 SOLUTION INTRAVENOUS
Status: CANCELLED | OUTPATIENT
Start: 2021-05-21

## 2021-05-21 RX ORDER — HEPARIN SODIUM,PORCINE 10 UNIT/ML
5 VIAL (ML) INTRAVENOUS
Status: CANCELLED | OUTPATIENT
Start: 2021-05-21

## 2021-05-21 RX ORDER — HEPARIN SODIUM (PORCINE) LOCK FLUSH IV SOLN 100 UNIT/ML 100 UNIT/ML
5 SOLUTION INTRAVENOUS
Status: DISCONTINUED | OUTPATIENT
Start: 2021-05-21 | End: 2021-05-21 | Stop reason: HOSPADM

## 2021-05-21 RX ORDER — HEPARIN SODIUM (PORCINE) LOCK FLUSH IV SOLN 100 UNIT/ML 100 UNIT/ML
500 SOLUTION INTRAVENOUS EVERY 8 HOURS
Status: COMPLETED | OUTPATIENT
Start: 2021-05-21 | End: 2021-05-21

## 2021-05-21 RX ADMIN — Medication 5 ML: at 14:40

## 2021-05-21 RX ADMIN — SODIUM CHLORIDE 1000 ML: 9 INJECTION, SOLUTION INTRAVENOUS at 10:17

## 2021-05-21 RX ADMIN — Medication 500 UNITS: at 11:26

## 2021-05-21 RX ADMIN — FILGRASTIM 480 MCG: 480 INJECTION, SOLUTION INTRAVENOUS; SUBCUTANEOUS at 10:25

## 2021-05-21 ASSESSMENT — PAIN SCALES - GENERAL: PAINLEVEL: NO PAIN (0)

## 2021-05-21 NOTE — LETTER
"    5/21/2021         RE: Michaela Dorman  [address]  [facility] [address]        Dear Colleague,    Thank you for referring your patient, Michaela Dorman, to the [facility]. Please see a copy of my visit note below.    Michaela is a 70 year old who is being evaluated via a billable video visit.      How would you like to obtain your AVS? MyChart  If the video visit is dropped, the invitation should be resent by: Text to cell phone: [phone]  Will anyone else be joining your video visit? No      Video Start Time:   Video-Visit Details    Type of service:  Video Visit    Video End Time:    Originating Location (pt. Location): Home    Distant Location (provider location):  [facility]     Platform used for Video Visit: Swivel  Text at [phone]    The patient has been notified of following:      \"This telephone visit will be conducted via a call between you and your physician/provider. We have found that certain health care needs can be provided without the need for a physical exam.  This service lets us provide the care you need with a short phone conversation during the COVID-19 pandemic.  If a prescription is necessary we can send it directly to your pharmacy.  If lab work is needed we can place an order for that and you can then stop by our lab to have the test done at a later time.     Telephone visits are billed at different rates depending on your insurance coverage. During this emergency period, for some insurers they may be billed the same as an in-person visit.  Please reach out to your insurance provider with any questions.     If during the course of the call the physician/provider feels a telephone visit is not appropriate, you will not be charged for this service.\"     Patient has given verbal consent for Telephone visit?  Yes       Unable to establish video visit.  Appointment changed to telephone visit    Telephone visit 20 " minutes      Oncology/Hematology Visit Note  May 21, 2021    Reason for Visit: follow up of bladder cancer    Michaela Dorman is a 70 year old female with a   The bladder cancer was invading the muscle, stage cT2. She is s/p TURBT and PET/CT 4/8/2021 showed likely residual tumor in the left trigone. She started neoadjuvant chemotherapy with cisplatin (split dose) 4/20. Her Cr was 0.9. 4/27 her Cr was 1.9. Cycle 2 she received carbo/gem 5/4 and plan is surgery on 6/1/2021 (radical cystectomy with ileal conduit).     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       Interval History:  Developed mucositis after the chemo.  Started oncology Magic mouthwash Neupogen for neutropenia  Reports mucositis has resolved  Patient denies fever chills sweats cough shortness of breath chest pain nausea vomiting diarrhea abdominal pain bleeding    She is worried about her hemoglobin of 8 and would like 1 unit of PRBC today        Review of Systems:  14 point ROS of systems including Constitutional, Eyes, Respiratory, Cardiovascular, Gastroenterology, Genitourinary, Integumentary, Muscularskeletal, Psychiatric were all negative except for pertinent positives noted in my HPI.        Physical Examination:  Telephone visit-no audible wheezing or cough, patient answers all questions appropriately    Laboratory Data:  CBC CMP results reviewed          Assessment and Plan:  This is a 70-year-old female with    High-grade muscle invasive bladder cancer  Post TURBT with residual disease on PET scan  04/20-started cycle 1 day 1 Cisplatin and Gemzar.  Complicated by acute renal failure  Cisplatin discontinued  05/04-cycle 2 Cisplatin switched to to Carboplatin /Gemzar  Cycle 2-day 8 given on 5/12   Plan for surgery on 6/1/2021 (radical cystectomy with ileal conduit).  Patient has follow-up appointment with Dr. Ferguson  after the surgery to discuss wound care/adjuvant treatment    Hx Mucositis secondary to chemotherapy/neutropenia  -Resolved      History of acute kidney injury secondary to cisplatin  Is been followed by nephrology  -Normal creatinine today      History of breast cancer  Continue follow-up with Dr. Ferguson       Anemia secondary to recent chemo  Patient denies bleeding  Hemoglobin is 8  We will give 1 unit PRBC today  Recheck CBC next week      Thrombocytopenia secondary to recent chemo  Patient denies bleeding  Platelets given on 05/19  Platelets 59 now  Trending up  Go to ER in the event of bleeding fall or injury  Recheck CBC next week  Transfuse for platelet count 20 or below or bleeding      Patient advised to call our clinic with changes in health condition or questions      LISA Foley CNP  [facility]     Chart documentation with Dragon Voice recognition Software. Although reviewed after completion, some words and grammatical errors may remain.            Again, thank you for allowing me to participate in the care of your patient.        Sincerely,        LISA Foley CNP

## 2021-05-21 NOTE — PROGRESS NOTES
Infusion Nursing Note:  Michaela Dorman presents today for 1 Unit PRBC's.    Patient seen by provider today: Yes: Stanislav Guzman.    present during visit today: Not Applicable.    Note: See previous Infusion note from earlier today. Also Provider notes.  Lungs clear prior to transfusion. VSS.     Intravenous Access:  Implanted Port.    Treatment Conditions:  Blood transfusion consent signed 5/19/21.      Post Infusion Assessment:  Patient tolerated infusion without incident. Lungs clear,heart rhythm regular, asymptomatic post transfusion. B/P a little elevated:160/82, HR 72, afebrile. Patient has been talking on the phone a lot during her transfusion.  Blood return noted pre and post infusion.  Site patent and intact, free from redness, edema or discomfort.  No evidence of extravasations.  Access discontinued per protocol.       Discharge Plan:   Copy of AVS reviewed with patient. Patient will return 5/24 for next appointment.  Patient discharged in stable condition accompanied by: self.  Departure Mode: Ambulatory.      Amy Renee RN

## 2021-05-21 NOTE — PROGRESS NOTES
Infusion Nursing Note:  Michaela Dorman presents today for IVF/Neupogen.    Patient seen by provider today: Yes has Phone visit scheduled this afternoon with LISA Foley CNP.    present during visit today: Not Applicable.    Note: Patient states she feels a little better and less dehydrated today. B/P stable, afebrile. Slept a lot yesterday afternoon. Mild fatigue.SOA with moderate activity otherwise none at rest.  Denies pain. Took Claritin to help with poss. pain r/t Neupogen. Patient did not meet criteria for an asymptomatic covid-19 PCR test in infusion today. Patient declined the covid-19 test.  Thinks that IV fluids help her feel better so she requested the 1L of fluids today.     Intravenous Access:  Implanted Port.  Labs drawn as ordered in R chest wall with 20G,3/4in needle. Good blood return, site WDL.     Treatment Conditions:  Lab Results   Component Value Date    HGB 8.0 05/21/2021     Lab Results   Component Value Date    WBC 9.3 05/21/2021      Lab Results   Component Value Date    ANEU 0.5 05/19/2021     Lab Results   Component Value Date    PLT 59 05/21/2021      Lab Results   Component Value Date     05/18/2021                   Lab Results   Component Value Date    POTASSIUM 4.5 05/18/2021           Lab Results   Component Value Date    MAG 1.9 05/12/2021            Lab Results   Component Value Date    CR 1.19 05/18/2021                   Lab Results   Component Value Date    VANDANA 9.5 05/18/2021                Lab Results   Component Value Date    BILITOTAL 0.1 05/18/2021           Lab Results   Component Value Date    ALBUMIN 4.0 05/18/2021                    Lab Results   Component Value Date    ALT 53 05/18/2021           Lab Results   Component Value Date    AST 24 05/18/2021           Post Infusion Assessment:  Patient tolerated infusion without incident.  Patient tolerated injection without incident.  No pain or swelling. Scant blood at injection site, bandaid  applied.  Blood return noted pre and post infusion.  Site patent and intact, free from redness, edema or discomfort.  No evidence of extravasations.  Access discontinued per protocol.       Discharge Plan:   Copy of AVS reviewed with patient. Patient does not have upcoming IVO appt scheduled at this time. Has a phone visit with LISA Foley CNP Oncology today.   Patient discharged in stable condition accompanied by: self.  Departure Mode: Ambulatory.      Amy Renee RN

## 2021-05-21 NOTE — LETTER
"    5/21/2021         RE: Michaela Dorman  [address]  [facility] [address]        Dear Colleague,    Thank you for referring your patient, Michaela Dorman, to the [facility]. Please see a copy of my visit note below.    Michaela is a 70 year old who is being evaluated via a billable video visit.      How would you like to obtain your AVS? MyChart  If the video visit is dropped, the invitation should be resent by: Text to cell phone: [phone]  Will anyone else be joining your video visit? No      Video Start Time:   Video-Visit Details    Type of service:  Video Visit    Video End Time:    Originating Location (pt. Location): Home    Distant Location (provider location):  [facility]     Platform used for Video Visit: Streamfile  Text at [phone]    The patient has been notified of following:      \"This telephone visit will be conducted via a call between you and your physician/provider. We have found that certain health care needs can be provided without the need for a physical exam.  This service lets us provide the care you need with a short phone conversation during the COVID-19 pandemic.  If a prescription is necessary we can send it directly to your pharmacy.  If lab work is needed we can place an order for that and you can then stop by our lab to have the test done at a later time.     Telephone visits are billed at different rates depending on your insurance coverage. During this emergency period, for some insurers they may be billed the same as an in-person visit.  Please reach out to your insurance provider with any questions.     If during the course of the call the physician/provider feels a telephone visit is not appropriate, you will not be charged for this service.\"     Patient has given verbal consent for Telephone visit?  Yes       Unable to establish video visit.  Appointment changed to telephone visit    Telephone visit 20 " minutes      Oncology/Hematology Visit Note  May 21, 2021    Reason for Visit: follow up of bladder cancer    Michaela Dorman is a 70 year old female with a   The bladder cancer was invading the muscle, stage cT2. She is s/p TURBT and PET/CT 4/8/2021 showed likely residual tumor in the left trigone. She started neoadjuvant chemotherapy with cisplatin (split dose) 4/20. Her Cr was 0.9. 4/27 her Cr was 1.9. Cycle 2 she received carbo/gem 5/4 and plan is surgery on 6/1/2021 (radical cystectomy with ileal conduit).     history of stage 2 ER/ME negative HER2 positive breast cancer s/p neoadjuvant chemo, bilateral mastectomy 2/8/2017 with pCR who has completed a year of herceptin and then was diagnosed with bladder cancer 2/22/2021.       Interval History:  Developed mucositis after the chemo.  Started oncology Magic mouthwash Neupogen for neutropenia  Reports mucositis has resolved  Patient denies fever chills sweats cough shortness of breath chest pain nausea vomiting diarrhea abdominal pain bleeding    She is worried about her hemoglobin of 8 and would like 1 unit of PRBC today        Review of Systems:  14 point ROS of systems including Constitutional, Eyes, Respiratory, Cardiovascular, Gastroenterology, Genitourinary, Integumentary, Muscularskeletal, Psychiatric were all negative except for pertinent positives noted in my HPI.        Physical Examination:  Telephone visit-no audible wheezing or cough, patient answers all questions appropriately    Laboratory Data:  CBC CMP results reviewed          Assessment and Plan:  This is a 70-year-old female with    High-grade muscle invasive bladder cancer  Post TURBT with residual disease on PET scan  04/20-started cycle 1 day 1 Cisplatin and Gemzar.  Complicated by acute renal failure  Cisplatin discontinued  05/04-cycle 2 Cisplatin switched to to Carboplatin /Gemzar  Cycle 2-day 8 given on 5/12   Plan for surgery on 6/1/2021 (radical cystectomy with ileal conduit).  Patient has follow-up appointment with Dr. Ferguson  after the surgery to discuss wound care/adjuvant treatment    Hx Mucositis secondary to chemotherapy/neutropenia  -Resolved      History of acute kidney injury secondary to cisplatin  Is been followed by nephrology  -Normal creatinine today      History of breast cancer  Continue follow-up with Dr. Ferguson       Anemia secondary to recent chemo  Patient denies bleeding  Hemoglobin is 8  We will give 1 unit PRBC today  Recheck CBC next week      Thrombocytopenia secondary to recent chemo  Patient denies bleeding  Platelets given on 05/19  Platelets 59 now  Trending up  Go to ER in the event of bleeding fall or injury  Recheck CBC next week  Transfuse for platelet count 20 or below or bleeding      Patient advised to call our clinic with changes in health condition or questions      LISA Foley CNP  [facility]     Chart documentation with Dragon Voice recognition Software. Although reviewed after completion, some words and grammatical errors may remain.            Again, thank you for allowing me to participate in the care of your patient.        Sincerely,        LISA Foley CNP

## 2021-05-21 NOTE — PROGRESS NOTES
Michaela is a 70 year old who is being evaluated via a billable video visit.      How would you like to obtain your AVS? MyChart  If the video visit is dropped, the invitation should be resent by: Text to cell phone: [phone]  Will anyone else be joining your video visit? No      Video Start Time:   Video-Visit Details    Type of service:  Video Visit    Video End Time:    Originating Location (pt. Location): Home    Distant Location (provider location):  [facility]     Platform used for Video Visit: Brooks  Text at [phone]

## 2021-05-21 NOTE — PATIENT INSTRUCTIONS
Patient Education   After Your Blood Transfusion  Discharge Instructions  After you leave  After a blood transfusion (received red blood cells, platelets, plasma, cryo or granulocytes), you will need to watch for signs of a reaction for the next 48 hours.  Call your clinic or 911 (or go to the Emergency room) if you have any signs of a reaction:    Shaking or chills    Fever (temperature above 100.0 F)    Headache    Nausea    Hives    Itching    Swelling of the face or feeling flushed    Ongoing dry cough (nothing is coughed up)    Trouble breathing or wheezing  Some signs of a reaction won't show up for a few days or up to 4 weeks.   These may include:    Fatigue    Dizziness    Pink or red urine  Your clinic is:   ________________________________________  ________________________________________  For informational purposes only. Not to replace the advice of your health care provider.   Copyright   2015 [employer]. All rights reserved. [employer] [phone] - Rev 07/16.

## 2021-05-21 NOTE — PROGRESS NOTES
"The patient has been notified of following:      \"This telephone visit will be conducted via a call between you and your physician/provider. We have found that certain health care needs can be provided without the need for a physical exam.  This service lets us provide the care you need with a short phone conversation during the COVID-19 pandemic.  If a prescription is necessary we can send it directly to your pharmacy.  If lab work is needed we can place an order for that and you can then stop by our lab to have the test done at a later time.     Telephone visits are billed at different rates depending on your insurance coverage. During this emergency period, for some insurers they may be billed the same as an in-person visit.  Please reach out to your insurance provider with any questions.     If during the course of the call the physician/provider feels a telephone visit is not appropriate, you will not be charged for this service.\"     Patient has given verbal consent for Telephone visit?  Yes       Unable to establish video visit.  Appointment changed to telephone visit    Telephone visit 20 minutes      Oncology/Hematology Visit Note  May 21, 2021    Reason for Visit: follow up of bladder cancer    Michaela Dorman is a 70 year old female with a   The bladder cancer was invading the muscle, stage cT2. She is s/p TURBT and PET/CT 4/8/2021 showed likely residual tumor in the left trigone. She started neoadjuvant chemotherapy with cisplatin (split dose) 4/20. Her Cr was 0.9. 4/27 her Cr was 1.9. Cycle 2 she received carbo/gem 5/4 and plan is surgery on 6/1/2021 (radical cystectomy with ileal conduit).     history of stage 2 ER/[place] negative HER2 positive breast cancer s/p neoadjuvant chemo, bilateral mastectomy 2/8/2017 with pCR who has completed a year of herceptin and then was diagnosed with bladder cancer 2/22/2021.       Interval History:  Developed mucositis after the chemo.  Started oncology Magic mouthwash " Neupogen for neutropenia  Reports mucositis has resolved  Patient denies fever chills sweats cough shortness of breath chest pain nausea vomiting diarrhea abdominal pain bleeding    She is worried about her hemoglobin of 8 and would like 1 unit of PRBC today        Review of Systems:  14 point ROS of systems including Constitutional, Eyes, Respiratory, Cardiovascular, Gastroenterology, Genitourinary, Integumentary, Muscularskeletal, Psychiatric were all negative except for pertinent positives noted in my HPI.        Physical Examination:  Telephone visit-no audible wheezing or cough, patient answers all questions appropriately    Laboratory Data:  CBC CMP results reviewed          Assessment and Plan:  This is a 70-year-old female with    High-grade muscle invasive bladder cancer  Post TURBT with residual disease on PET scan  04/20-started cycle 1 day 1 Cisplatin and Gemzar.  Complicated by acute renal failure  Cisplatin discontinued  05/04-cycle 2 Cisplatin switched to to Carboplatin /Gemzar  Cycle 2-day 8 given on 5/12   Plan for surgery on 6/1/2021 (radical cystectomy with ileal conduit). Patient has follow-up appointment with Dr. Ferguson  after the surgery to discuss wound care/adjuvant treatment    Hx Mucositis secondary to chemotherapy/neutropenia  -Resolved      History of acute kidney injury secondary to cisplatin  Is been followed by nephrology  -Normal creatinine today      History of breast cancer  Continue follow-up with Dr. Ferguson       Anemia secondary to recent chemo  Patient denies bleeding  Hemoglobin is 8  We will give 1 unit PRBC today  Recheck CBC next week      Thrombocytopenia secondary to recent chemo  Patient denies bleeding  Platelets given on 05/19  Platelets 59 now  Trending up  Go to ER in the event of bleeding fall or injury  Recheck CBC next week  Transfuse for platelet count 20 or below or bleeding      Patient advised to call our clinic with changes in health condition or  questions      LISA Foley CNP  [facility]     Chart documentation with Dragon Voice recognition Software. Although reviewed after completion, some words and grammatical errors may remain.

## 2021-05-24 ENCOUNTER — INFUSION THERAPY VISIT (OUTPATIENT)
Dept: INFUSION THERAPY | Facility: CLINIC | Age: 71
End: 2021-05-24
Attending: NURSE PRACTITIONER
Payer: MEDICARE

## 2021-05-24 VITALS
OXYGEN SATURATION: 96 % | HEART RATE: 69 BPM | RESPIRATION RATE: 18 BRPM | SYSTOLIC BLOOD PRESSURE: 146 MMHG | TEMPERATURE: 98.9 F | DIASTOLIC BLOOD PRESSURE: 82 MMHG

## 2021-05-24 DIAGNOSIS — C67.9 UROTHELIAL CARCINOMA OF BLADDER WITH INVASION OF MUSCLE (H): ICD-10-CM

## 2021-05-24 LAB
BASOPHILS # BLD AUTO: 0 10E9/L (ref 0–0.2)
BASOPHILS NFR BLD AUTO: 0 %
DIFFERENTIAL METHOD BLD: ABNORMAL
EOSINOPHIL # BLD AUTO: 0 10E9/L (ref 0–0.7)
EOSINOPHIL NFR BLD AUTO: 0 %
ERYTHROCYTE [DISTWIDTH] IN BLOOD BY AUTOMATED COUNT: 13.2 % (ref 10–15)
HCT VFR BLD AUTO: 29.7 % (ref 35–47)
HGB BLD-MCNC: 9.7 G/DL (ref 11.7–15.7)
LYMPHOCYTES # BLD AUTO: 5.4 10E9/L (ref 0.8–5.3)
LYMPHOCYTES NFR BLD AUTO: 40 %
MCH RBC QN AUTO: 32.9 PG (ref 26.5–33)
MCHC RBC AUTO-ENTMCNC: 32.7 G/DL (ref 31.5–36.5)
MCV RBC AUTO: 101 FL (ref 78–100)
METAMYELOCYTES # BLD: 0.5 10E9/L
METAMYELOCYTES NFR BLD MANUAL: 4 %
MONOCYTES # BLD AUTO: 1 10E9/L (ref 0–1.3)
MONOCYTES NFR BLD AUTO: 7 %
MYELOCYTES # BLD: 0.3 10E9/L
MYELOCYTES NFR BLD MANUAL: 2 %
NEUTROPHILS # BLD AUTO: 5.7 10E9/L (ref 1.6–8.3)
NEUTROPHILS NFR BLD AUTO: 42 %
OTHER CELLS # BLD MANUAL: 0.1 10E9/L
OTHER CELLS NFR BLD MANUAL: 1 %
PLATELET # BLD AUTO: 123 10E9/L (ref 150–450)
PLATELET # BLD EST: ABNORMAL 10*3/UL
POLYCHROMASIA BLD QL SMEAR: SLIGHT
PROMYELOCYTES # BLD MANUAL: 0.5 10E9/L
PROMYELOCYTES NFR BLD MANUAL: 4 %
RBC # BLD AUTO: 2.95 10E12/L (ref 3.8–5.2)
RBC MORPH BLD: ABNORMAL
TOXIC GRANULES BLD QL SMEAR: PRESENT
WBC # BLD AUTO: 13.6 10E9/L (ref 4–11)

## 2021-05-24 PROCEDURE — 85025 COMPLETE CBC W/AUTO DIFF WBC: CPT | Performed by: NURSE PRACTITIONER

## 2021-05-24 PROCEDURE — 250N000011 HC RX IP 250 OP 636: Performed by: NURSE PRACTITIONER

## 2021-05-24 PROCEDURE — 36591 DRAW BLOOD OFF VENOUS DEVICE: CPT

## 2021-05-24 RX ORDER — HEPARIN SODIUM (PORCINE) LOCK FLUSH IV SOLN 100 UNIT/ML 100 UNIT/ML
500 SOLUTION INTRAVENOUS EVERY 8 HOURS
Status: DISCONTINUED | OUTPATIENT
Start: 2021-05-24 | End: 2021-05-24 | Stop reason: HOSPADM

## 2021-05-24 RX ORDER — HEPARIN SODIUM,PORCINE 10 UNIT/ML
5 VIAL (ML) INTRAVENOUS
Status: CANCELLED | OUTPATIENT
Start: 2021-05-24

## 2021-05-24 RX ORDER — HEPARIN SODIUM (PORCINE) LOCK FLUSH IV SOLN 100 UNIT/ML 100 UNIT/ML
5 SOLUTION INTRAVENOUS
Status: CANCELLED | OUTPATIENT
Start: 2021-05-24

## 2021-05-24 RX ADMIN — Medication 500 UNITS: at 12:17

## 2021-05-24 ASSESSMENT — PAIN SCALES - GENERAL: PAINLEVEL: NO PAIN (0)

## 2021-05-24 NOTE — PROGRESS NOTES
Infusion Nursing Note:  Michaela Dorman presents today for port labs.    Patient seen by provider today: No   present during visit today: Not Applicable.    Note: N/A.  Patient  did not meet criteria for an asymptomatic covid-19 PCR test in infusion today.    Intravenous Access:  Labs drawn without difficulty.  Implanted Port.    Treatment Conditions:  Lab Results   Component Value Date    HGB 9.7 05/24/2021     Lab Results   Component Value Date    WBC 13.6 05/24/2021      Lab Results   Component Value Date    ANEU 5.7 05/24/2021     Lab Results   Component Value Date     05/24/2021          Post Infusion Assessment:  No further treatment required.       Discharge Plan:   Discharge instructions reviewed with: Patient.  Patient discharged in stable condition accompanied by: self.  Departure Mode: Ambulatory.      Susanne Yu RN

## 2021-05-28 DIAGNOSIS — Z11.59 ENCOUNTER FOR SCREENING FOR OTHER VIRAL DISEASES: ICD-10-CM

## 2021-05-28 LAB
LABORATORY COMMENT REPORT: NORMAL
SARS-COV-2 RNA RESP QL NAA+PROBE: NEGATIVE
SARS-COV-2 RNA RESP QL NAA+PROBE: NORMAL
SPECIMEN SOURCE: NORMAL
SPECIMEN SOURCE: NORMAL

## 2021-05-28 PROCEDURE — U0003 INFECTIOUS AGENT DETECTION BY NUCLEIC ACID (DNA OR RNA); SEVERE ACUTE RESPIRATORY SYNDROME CORONAVIRUS 2 (SARS-COV-2) (CORONAVIRUS DISEASE [COVID-19]), AMPLIFIED PROBE TECHNIQUE, MAKING USE OF HIGH THROUGHPUT TECHNOLOGIES AS DESCRIBED BY CMS-2020-01-R: HCPCS | Performed by: UROLOGY

## 2021-05-28 PROCEDURE — U0005 INFEC AGEN DETEC AMPLI PROBE: HCPCS | Performed by: UROLOGY

## 2021-06-01 ENCOUNTER — APPOINTMENT (OUTPATIENT)
Dept: GENERAL RADIOLOGY | Facility: CLINIC | Age: 71
DRG: 655 | End: 2021-06-01
Attending: UROLOGY
Payer: MEDICARE

## 2021-06-01 ENCOUNTER — ANESTHESIA (OUTPATIENT)
Dept: SURGERY | Facility: CLINIC | Age: 71
DRG: 655 | End: 2021-06-01
Payer: MEDICARE

## 2021-06-01 ENCOUNTER — HOSPITAL ENCOUNTER (INPATIENT)
Facility: CLINIC | Age: 71
LOS: 5 days | Discharge: HOME | DRG: 655 | End: 2021-06-06
Attending: UROLOGY | Admitting: UROLOGY
Payer: MEDICARE

## 2021-06-01 DIAGNOSIS — C67.9 UROTHELIAL CARCINOMA OF BLADDER WITH INVASION OF MUSCLE (H): ICD-10-CM

## 2021-06-01 DIAGNOSIS — G89.18 POSTOPERATIVE PAIN: ICD-10-CM

## 2021-06-01 DIAGNOSIS — C67.9 UROTHELIAL CARCINOMA OF BLADDER WITH INVASION OF MUSCLE (H): Primary | ICD-10-CM

## 2021-06-01 LAB
ABO + RH BLD: NORMAL
ABO + RH BLD: NORMAL
ANION GAP SERPL CALCULATED.3IONS-SCNC: 5 MMOL/L (ref 3–14)
BASOPHILS # BLD AUTO: 0.1 10E9/L (ref 0–0.2)
BASOPHILS NFR BLD AUTO: 0.9 %
BLD GP AB SCN SERPL QL: NORMAL
BLD PROD TYP BPU: NORMAL
BLOOD BANK CMNT PATIENT-IMP: NORMAL
BUN SERPL-MCNC: 20 MG/DL (ref 7–30)
CALCIUM SERPL-MCNC: 8.8 MG/DL (ref 8.5–10.1)
CHLORIDE SERPL-SCNC: 110 MMOL/L (ref 94–109)
CO2 SERPL-SCNC: 25 MMOL/L (ref 20–32)
CREAT SERPL-MCNC: 0.98 MG/DL (ref 0.52–1.04)
CREAT SERPL-MCNC: 0.98 MG/DL (ref 0.52–1.04)
DIFFERENTIAL METHOD BLD: ABNORMAL
EOSINOPHIL # BLD AUTO: 0.1 10E9/L (ref 0–0.7)
EOSINOPHIL NFR BLD AUTO: 1.4 %
ERYTHROCYTE [DISTWIDTH] IN BLOOD BY AUTOMATED COUNT: 13.2 % (ref 10–15)
ERYTHROCYTE [DISTWIDTH] IN BLOOD BY AUTOMATED COUNT: 13.2 % (ref 10–15)
GFR SERPL CREATININE-BSD FRML MDRD: 58 ML/MIN/{1.73_M2}
GFR SERPL CREATININE-BSD FRML MDRD: 58 ML/MIN/{1.73_M2}
GLUCOSE BLDC GLUCOMTR-MCNC: 96 MG/DL (ref 70–99)
GLUCOSE SERPL-MCNC: 134 MG/DL (ref 70–99)
HCT VFR BLD AUTO: 29.6 % (ref 35–47)
HCT VFR BLD AUTO: 30.5 % (ref 35–47)
HGB BLD-MCNC: 9.6 G/DL (ref 11.7–15.7)
HGB BLD-MCNC: 9.9 G/DL (ref 11.7–15.7)
IMM GRANULOCYTES # BLD: 0 10E9/L (ref 0–0.4)
IMM GRANULOCYTES NFR BLD: 0.7 %
LYMPHOCYTES # BLD AUTO: 1.3 10E9/L (ref 0.8–5.3)
LYMPHOCYTES NFR BLD AUTO: 23.5 %
MCH RBC QN AUTO: 32.9 PG (ref 26.5–33)
MCH RBC QN AUTO: 33.1 PG (ref 26.5–33)
MCHC RBC AUTO-ENTMCNC: 32.4 G/DL (ref 31.5–36.5)
MCHC RBC AUTO-ENTMCNC: 32.5 G/DL (ref 31.5–36.5)
MCV RBC AUTO: 101 FL (ref 78–100)
MCV RBC AUTO: 102 FL (ref 78–100)
MONOCYTES # BLD AUTO: 0.6 10E9/L (ref 0–1.3)
MONOCYTES NFR BLD AUTO: 10.9 %
NEUTROPHILS # BLD AUTO: 3.5 10E9/L (ref 1.6–8.3)
NEUTROPHILS NFR BLD AUTO: 62.6 %
NRBC # BLD AUTO: 0 10*3/UL
NRBC BLD AUTO-RTO: 0 /100
NUM BPU REQUESTED: 2
PLATELET # BLD AUTO: 333 10E9/L (ref 150–450)
PLATELET # BLD AUTO: 367 10E9/L (ref 150–450)
POTASSIUM SERPL-SCNC: 3.8 MMOL/L (ref 3.4–5.3)
POTASSIUM SERPL-SCNC: 4.2 MMOL/L (ref 3.4–5.3)
RBC # BLD AUTO: 2.9 10E12/L (ref 3.8–5.2)
RBC # BLD AUTO: 3.01 10E12/L (ref 3.8–5.2)
SODIUM SERPL-SCNC: 140 MMOL/L (ref 133–144)
SPECIMEN EXP DATE BLD: NORMAL
WBC # BLD AUTO: 32.4 10E9/L (ref 4–11)
WBC # BLD AUTO: 5.6 10E9/L (ref 4–11)

## 2021-06-01 PROCEDURE — 86923 COMPATIBILITY TEST ELECTRIC: CPT | Performed by: UROLOGY

## 2021-06-01 PROCEDURE — 250N000011 HC RX IP 250 OP 636: Performed by: UROLOGY

## 2021-06-01 PROCEDURE — 999N000015 HC STATISTIC ARTERIAL MONITORING DAILY

## 2021-06-01 PROCEDURE — 0TTB0ZZ RESECTION OF BLADDER, OPEN APPROACH: ICD-10-PCS | Performed by: UROLOGY

## 2021-06-01 PROCEDURE — 360N000078 HC SURGERY LEVEL 5, PER MIN: Performed by: UROLOGY

## 2021-06-01 PROCEDURE — 85027 COMPLETE CBC AUTOMATED: CPT | Performed by: STUDENT IN AN ORGANIZED HEALTH CARE EDUCATION/TRAINING PROGRAM

## 2021-06-01 PROCEDURE — 86901 BLOOD TYPING SEROLOGIC RH(D): CPT | Performed by: UROLOGY

## 2021-06-01 PROCEDURE — 120N000002 HC R&B MED SURG/OB UMMC

## 2021-06-01 PROCEDURE — 999N001017 HC STATISTIC GLUCOSE BY METER IP

## 2021-06-01 PROCEDURE — 272N000001 HC OR GENERAL SUPPLY STERILE: Performed by: UROLOGY

## 2021-06-01 PROCEDURE — 84132 ASSAY OF SERUM POTASSIUM: CPT | Performed by: UROLOGY

## 2021-06-01 PROCEDURE — 85018 HEMOGLOBIN: CPT | Performed by: STUDENT IN AN ORGANIZED HEALTH CARE EDUCATION/TRAINING PROGRAM

## 2021-06-01 PROCEDURE — 0TB60ZZ EXCISION OF RIGHT URETER, OPEN APPROACH: ICD-10-PCS | Performed by: UROLOGY

## 2021-06-01 PROCEDURE — 258N000003 HC RX IP 258 OP 636: Performed by: STUDENT IN AN ORGANIZED HEALTH CARE EDUCATION/TRAINING PROGRAM

## 2021-06-01 PROCEDURE — 88309 TISSUE EXAM BY PATHOLOGIST: CPT | Mod: TC | Performed by: UROLOGY

## 2021-06-01 PROCEDURE — 88307 TISSUE EXAM BY PATHOLOGIST: CPT | Mod: TC | Performed by: UROLOGY

## 2021-06-01 PROCEDURE — 250N000011 HC RX IP 250 OP 636: Performed by: STUDENT IN AN ORGANIZED HEALTH CARE EDUCATION/TRAINING PROGRAM

## 2021-06-01 PROCEDURE — 250N000013 HC RX MED GY IP 250 OP 250 PS 637: Performed by: STUDENT IN AN ORGANIZED HEALTH CARE EDUCATION/TRAINING PROGRAM

## 2021-06-01 PROCEDURE — 36592 COLLECT BLOOD FROM PICC: CPT | Performed by: STUDENT IN AN ORGANIZED HEALTH CARE EDUCATION/TRAINING PROGRAM

## 2021-06-01 PROCEDURE — 82565 ASSAY OF CREATININE: CPT | Performed by: UROLOGY

## 2021-06-01 PROCEDURE — 250N000011 HC RX IP 250 OP 636: Performed by: NURSE ANESTHETIST, CERTIFIED REGISTERED

## 2021-06-01 PROCEDURE — 88307 TISSUE EXAM BY PATHOLOGIST: CPT | Mod: 26 | Performed by: PATHOLOGY

## 2021-06-01 PROCEDURE — 370N000017 HC ANESTHESIA TECHNICAL FEE, PER MIN: Performed by: UROLOGY

## 2021-06-01 PROCEDURE — 710N000010 HC RECOVERY PHASE 1, LEVEL 2, PER MIN: Performed by: UROLOGY

## 2021-06-01 PROCEDURE — 0T1807C BYPASS BILATERAL URETERS TO ILEOCUTANEOUS WITH AUTOLOGOUS TISSUE SUBSTITUTE, OPEN APPROACH: ICD-10-PCS | Performed by: UROLOGY

## 2021-06-01 PROCEDURE — 86900 BLOOD TYPING SEROLOGIC ABO: CPT | Performed by: UROLOGY

## 2021-06-01 PROCEDURE — 999N000065 XR ABDOMEN PORT 1 VIEWS

## 2021-06-01 PROCEDURE — 86850 RBC ANTIBODY SCREEN: CPT | Performed by: UROLOGY

## 2021-06-01 PROCEDURE — 88331 PATH CONSLTJ SURG 1 BLK 1SPC: CPT | Mod: 26 | Performed by: PATHOLOGY

## 2021-06-01 PROCEDURE — 80048 BASIC METABOLIC PNL TOTAL CA: CPT | Performed by: STUDENT IN AN ORGANIZED HEALTH CARE EDUCATION/TRAINING PROGRAM

## 2021-06-01 PROCEDURE — 0TB70ZZ EXCISION OF LEFT URETER, OPEN APPROACH: ICD-10-PCS | Performed by: UROLOGY

## 2021-06-01 PROCEDURE — 88305 TISSUE EXAM BY PATHOLOGIST: CPT | Mod: 26 | Performed by: PATHOLOGY

## 2021-06-01 PROCEDURE — 0UT70ZZ RESECTION OF BILATERAL FALLOPIAN TUBES, OPEN APPROACH: ICD-10-PCS | Performed by: UROLOGY

## 2021-06-01 PROCEDURE — 999N000157 HC STATISTIC RCP TIME EA 10 MIN

## 2021-06-01 PROCEDURE — 88332 PATH CONSLTJ SURG EA ADD BLK: CPT | Mod: 26 | Performed by: PATHOLOGY

## 2021-06-01 PROCEDURE — 250N000013 HC RX MED GY IP 250 OP 250 PS 637: Performed by: CLINICAL NURSE SPECIALIST

## 2021-06-01 PROCEDURE — C2617 STENT, NON-COR, TEM W/O DEL: HCPCS | Performed by: UROLOGY

## 2021-06-01 PROCEDURE — 999N000141 HC STATISTIC PRE-PROCEDURE NURSING ASSESSMENT: Performed by: UROLOGY

## 2021-06-01 PROCEDURE — 07TC0ZZ RESECTION OF PELVIS LYMPHATIC, OPEN APPROACH: ICD-10-PCS | Performed by: UROLOGY

## 2021-06-01 PROCEDURE — C9290 INJ, BUPIVACAINE LIPOSOME: HCPCS | Performed by: STUDENT IN AN ORGANIZED HEALTH CARE EDUCATION/TRAINING PROGRAM

## 2021-06-01 PROCEDURE — 0UT20ZZ RESECTION OF BILATERAL OVARIES, OPEN APPROACH: ICD-10-PCS | Performed by: UROLOGY

## 2021-06-01 PROCEDURE — 250N000025 HC SEVOFLURANE, PER MIN: Performed by: UROLOGY

## 2021-06-01 PROCEDURE — 0UT90ZZ RESECTION OF UTERUS, OPEN APPROACH: ICD-10-PCS | Performed by: UROLOGY

## 2021-06-01 PROCEDURE — 250N000009 HC RX 250: Performed by: NURSE ANESTHETIST, CERTIFIED REGISTERED

## 2021-06-01 PROCEDURE — 88309 TISSUE EXAM BY PATHOLOGIST: CPT | Mod: 26 | Performed by: PATHOLOGY

## 2021-06-01 PROCEDURE — 88305 TISSUE EXAM BY PATHOLOGIST: CPT | Mod: TC | Performed by: UROLOGY

## 2021-06-01 PROCEDURE — 250N000009 HC RX 250: Performed by: STUDENT IN AN ORGANIZED HEALTH CARE EDUCATION/TRAINING PROGRAM

## 2021-06-01 PROCEDURE — 74018 RADEX ABDOMEN 1 VIEW: CPT | Mod: 26 | Performed by: STUDENT IN AN ORGANIZED HEALTH CARE EDUCATION/TRAINING PROGRAM

## 2021-06-01 PROCEDURE — 85025 COMPLETE CBC W/AUTO DIFF WBC: CPT | Performed by: UROLOGY

## 2021-06-01 PROCEDURE — 88331 PATH CONSLTJ SURG 1 BLK 1SPC: CPT | Mod: TC | Performed by: UROLOGY

## 2021-06-01 DEVICE — STENT URINARY DIVERSION PERCFLEX SET 6FRX80CM M0061602050: Type: IMPLANTABLE DEVICE | Site: URETER | Status: FUNCTIONAL

## 2021-06-01 RX ORDER — ERTAPENEM 1 G/1
1 INJECTION, POWDER, LYOPHILIZED, FOR SOLUTION INTRAMUSCULAR; INTRAVENOUS
Status: COMPLETED | OUTPATIENT
Start: 2021-06-01 | End: 2021-06-01

## 2021-06-01 RX ORDER — DEXAMETHASONE SODIUM PHOSPHATE 4 MG/ML
INJECTION, SOLUTION INTRA-ARTICULAR; INTRALESIONAL; INTRAMUSCULAR; INTRAVENOUS; SOFT TISSUE PRN
Status: DISCONTINUED | OUTPATIENT
Start: 2021-06-01 | End: 2021-06-01

## 2021-06-01 RX ORDER — FAMOTIDINE 20 MG/1
20 TABLET, FILM COATED ORAL 2 TIMES DAILY
Status: DISCONTINUED | OUTPATIENT
Start: 2021-06-01 | End: 2021-06-02 | Stop reason: ALTCHOICE

## 2021-06-01 RX ORDER — PROPOFOL 10 MG/ML
INJECTION, EMULSION INTRAVENOUS PRN
Status: DISCONTINUED | OUTPATIENT
Start: 2021-06-01 | End: 2021-06-01

## 2021-06-01 RX ORDER — ONDANSETRON 4 MG/1
4 TABLET, ORALLY DISINTEGRATING ORAL EVERY 30 MIN PRN
Status: DISCONTINUED | OUTPATIENT
Start: 2021-06-01 | End: 2021-06-01 | Stop reason: HOSPADM

## 2021-06-01 RX ORDER — ERTAPENEM 1 G/1
1 INJECTION, POWDER, LYOPHILIZED, FOR SOLUTION INTRAMUSCULAR; INTRAVENOUS SEE ADMIN INSTRUCTIONS
Status: DISCONTINUED | OUTPATIENT
Start: 2021-06-01 | End: 2021-06-01 | Stop reason: HOSPADM

## 2021-06-01 RX ORDER — LIDOCAINE 40 MG/G
CREAM TOPICAL
Status: DISCONTINUED | OUTPATIENT
Start: 2021-06-01 | End: 2021-06-06 | Stop reason: HOSPADM

## 2021-06-01 RX ORDER — LIDOCAINE HYDROCHLORIDE 20 MG/ML
INJECTION, SOLUTION INFILTRATION; PERINEURAL PRN
Status: DISCONTINUED | OUTPATIENT
Start: 2021-06-01 | End: 2021-06-01

## 2021-06-01 RX ORDER — ESCITALOPRAM OXALATE 10 MG/1
10 TABLET ORAL DAILY
Status: DISCONTINUED | OUTPATIENT
Start: 2021-06-01 | End: 2021-06-01

## 2021-06-01 RX ORDER — SODIUM CHLORIDE, SODIUM LACTATE, POTASSIUM CHLORIDE, CALCIUM CHLORIDE 600; 310; 30; 20 MG/100ML; MG/100ML; MG/100ML; MG/100ML
INJECTION, SOLUTION INTRAVENOUS CONTINUOUS PRN
Status: DISCONTINUED | OUTPATIENT
Start: 2021-06-01 | End: 2021-06-01

## 2021-06-01 RX ORDER — HYDROXYZINE HYDROCHLORIDE 10 MG/1
10 TABLET, FILM COATED ORAL EVERY 6 HOURS PRN
Status: DISCONTINUED | OUTPATIENT
Start: 2021-06-01 | End: 2021-06-06 | Stop reason: HOSPADM

## 2021-06-01 RX ORDER — HEPARIN SODIUM 5000 [USP'U]/.5ML
5000 INJECTION, SOLUTION INTRAVENOUS; SUBCUTANEOUS
Status: COMPLETED | OUTPATIENT
Start: 2021-06-01 | End: 2021-06-01

## 2021-06-01 RX ORDER — HEPARIN SODIUM 5000 [USP'U]/.5ML
5000 INJECTION, SOLUTION INTRAVENOUS; SUBCUTANEOUS EVERY 8 HOURS
Status: DISCONTINUED | OUTPATIENT
Start: 2021-06-02 | End: 2021-06-05

## 2021-06-01 RX ORDER — ALPRAZOLAM 0.25 MG
0.5 TABLET ORAL
Status: DISCONTINUED | OUTPATIENT
Start: 2021-06-01 | End: 2021-06-06 | Stop reason: HOSPADM

## 2021-06-01 RX ORDER — ATORVASTATIN CALCIUM 20 MG/1
20 TABLET, FILM COATED ORAL DAILY
Status: DISCONTINUED | OUTPATIENT
Start: 2021-06-02 | End: 2021-06-06 | Stop reason: HOSPADM

## 2021-06-01 RX ORDER — NALOXONE HYDROCHLORIDE 0.4 MG/ML
0.2 INJECTION, SOLUTION INTRAMUSCULAR; INTRAVENOUS; SUBCUTANEOUS
Status: DISCONTINUED | OUTPATIENT
Start: 2021-06-01 | End: 2021-06-06 | Stop reason: HOSPADM

## 2021-06-01 RX ORDER — ACETAMINOPHEN 325 MG/1
975 TABLET ORAL ONCE
Status: DISCONTINUED | OUTPATIENT
Start: 2021-06-01 | End: 2021-06-01 | Stop reason: HOSPADM

## 2021-06-01 RX ORDER — BISACODYL 10 MG
10 SUPPOSITORY, RECTAL RECTAL DAILY PRN
Status: DISCONTINUED | OUTPATIENT
Start: 2021-06-01 | End: 2021-06-06 | Stop reason: HOSPADM

## 2021-06-01 RX ORDER — FENTANYL CITRATE 50 UG/ML
INJECTION, SOLUTION INTRAMUSCULAR; INTRAVENOUS PRN
Status: DISCONTINUED | OUTPATIENT
Start: 2021-06-01 | End: 2021-06-01

## 2021-06-01 RX ORDER — NALOXONE HYDROCHLORIDE 0.4 MG/ML
0.4 INJECTION, SOLUTION INTRAMUSCULAR; INTRAVENOUS; SUBCUTANEOUS
Status: DISCONTINUED | OUTPATIENT
Start: 2021-06-01 | End: 2021-06-06 | Stop reason: HOSPADM

## 2021-06-01 RX ORDER — ACETAMINOPHEN 325 MG/1
975 TABLET ORAL ONCE
Status: DISCONTINUED | OUTPATIENT
Start: 2021-06-01 | End: 2021-06-01

## 2021-06-01 RX ORDER — ONDANSETRON 2 MG/ML
4 INJECTION INTRAMUSCULAR; INTRAVENOUS EVERY 30 MIN PRN
Status: DISCONTINUED | OUTPATIENT
Start: 2021-06-01 | End: 2021-06-01 | Stop reason: HOSPADM

## 2021-06-01 RX ORDER — FENTANYL CITRATE 50 UG/ML
25-50 INJECTION, SOLUTION INTRAMUSCULAR; INTRAVENOUS
Status: DISCONTINUED | OUTPATIENT
Start: 2021-06-01 | End: 2021-06-01 | Stop reason: HOSPADM

## 2021-06-01 RX ORDER — EPHEDRINE SULFATE 50 MG/ML
INJECTION, SOLUTION INTRAMUSCULAR; INTRAVENOUS; SUBCUTANEOUS PRN
Status: DISCONTINUED | OUTPATIENT
Start: 2021-06-01 | End: 2021-06-01

## 2021-06-01 RX ORDER — HYDROMORPHONE HYDROCHLORIDE 1 MG/ML
INJECTION, SOLUTION INTRAMUSCULAR; INTRAVENOUS; SUBCUTANEOUS
Status: DISPENSED
Start: 2021-06-01 | End: 2021-06-02

## 2021-06-01 RX ORDER — ACETAMINOPHEN 325 MG/1
975 TABLET ORAL EVERY 8 HOURS
Status: COMPLETED | OUTPATIENT
Start: 2021-06-01 | End: 2021-06-04

## 2021-06-01 RX ORDER — AMOXICILLIN 250 MG
1 CAPSULE ORAL 2 TIMES DAILY
Status: DISCONTINUED | OUTPATIENT
Start: 2021-06-01 | End: 2021-06-06 | Stop reason: HOSPADM

## 2021-06-01 RX ORDER — BUPIVACAINE HYDROCHLORIDE 2.5 MG/ML
INJECTION, SOLUTION EPIDURAL; INFILTRATION; INTRACAUDAL
Status: COMPLETED | OUTPATIENT
Start: 2021-06-01 | End: 2021-06-01

## 2021-06-01 RX ORDER — SODIUM CHLORIDE 9 MG/ML
INJECTION, SOLUTION INTRAVENOUS CONTINUOUS
Status: DISCONTINUED | OUTPATIENT
Start: 2021-06-01 | End: 2021-06-04 | Stop reason: CLARIF

## 2021-06-01 RX ORDER — ONDANSETRON 2 MG/ML
INJECTION INTRAMUSCULAR; INTRAVENOUS PRN
Status: DISCONTINUED | OUTPATIENT
Start: 2021-06-01 | End: 2021-06-01

## 2021-06-01 RX ORDER — NALOXONE HYDROCHLORIDE 0.4 MG/ML
0.2 INJECTION, SOLUTION INTRAMUSCULAR; INTRAVENOUS; SUBCUTANEOUS
Status: DISCONTINUED | OUTPATIENT
Start: 2021-06-01 | End: 2021-06-01 | Stop reason: HOSPADM

## 2021-06-01 RX ORDER — NALOXONE HYDROCHLORIDE 0.4 MG/ML
0.4 INJECTION, SOLUTION INTRAMUSCULAR; INTRAVENOUS; SUBCUTANEOUS
Status: DISCONTINUED | OUTPATIENT
Start: 2021-06-01 | End: 2021-06-01 | Stop reason: HOSPADM

## 2021-06-01 RX ORDER — ONDANSETRON 4 MG/1
4 TABLET, ORALLY DISINTEGRATING ORAL EVERY 6 HOURS PRN
Status: DISCONTINUED | OUTPATIENT
Start: 2021-06-01 | End: 2021-06-06 | Stop reason: HOSPADM

## 2021-06-01 RX ORDER — ONDANSETRON 2 MG/ML
4 INJECTION INTRAMUSCULAR; INTRAVENOUS EVERY 6 HOURS PRN
Status: DISCONTINUED | OUTPATIENT
Start: 2021-06-01 | End: 2021-06-06 | Stop reason: HOSPADM

## 2021-06-01 RX ORDER — POLYETHYLENE GLYCOL 3350 17 G/17G
17 POWDER, FOR SOLUTION ORAL DAILY
Status: DISCONTINUED | OUTPATIENT
Start: 2021-06-02 | End: 2021-06-06 | Stop reason: HOSPADM

## 2021-06-01 RX ORDER — MECLIZINE HYDROCHLORIDE 25 MG/1
25 TABLET ORAL EVERY 6 HOURS PRN
Status: DISCONTINUED | OUTPATIENT
Start: 2021-06-01 | End: 2021-06-06 | Stop reason: HOSPADM

## 2021-06-01 RX ORDER — METOPROLOL TARTRATE 1 MG/ML
INJECTION, SOLUTION INTRAVENOUS PRN
Status: DISCONTINUED | OUTPATIENT
Start: 2021-06-01 | End: 2021-06-01

## 2021-06-01 RX ORDER — DOCUSATE SODIUM 100 MG/1
100 CAPSULE, LIQUID FILLED ORAL 2 TIMES DAILY
Status: DISCONTINUED | OUTPATIENT
Start: 2021-06-01 | End: 2021-06-06 | Stop reason: HOSPADM

## 2021-06-01 RX ORDER — OXYCODONE HYDROCHLORIDE 5 MG/1
5-10 TABLET ORAL EVERY 4 HOURS PRN
Status: DISCONTINUED | OUTPATIENT
Start: 2021-06-01 | End: 2021-06-06 | Stop reason: HOSPADM

## 2021-06-01 RX ORDER — ACETAMINOPHEN 325 MG/1
650 TABLET ORAL EVERY 4 HOURS PRN
Status: DISCONTINUED | OUTPATIENT
Start: 2021-06-04 | End: 2021-06-06 | Stop reason: HOSPADM

## 2021-06-01 RX ORDER — LABETALOL HYDROCHLORIDE 5 MG/ML
10 INJECTION, SOLUTION INTRAVENOUS
Status: DISCONTINUED | OUTPATIENT
Start: 2021-06-01 | End: 2021-06-01 | Stop reason: HOSPADM

## 2021-06-01 RX ORDER — HYDROMORPHONE HYDROCHLORIDE 1 MG/ML
.3-.5 INJECTION, SOLUTION INTRAMUSCULAR; INTRAVENOUS; SUBCUTANEOUS
Status: DISCONTINUED | OUTPATIENT
Start: 2021-06-01 | End: 2021-06-06 | Stop reason: HOSPADM

## 2021-06-01 RX ORDER — HYDROMORPHONE HYDROCHLORIDE 1 MG/ML
.3-.5 INJECTION, SOLUTION INTRAMUSCULAR; INTRAVENOUS; SUBCUTANEOUS EVERY 5 MIN PRN
Status: DISCONTINUED | OUTPATIENT
Start: 2021-06-01 | End: 2021-06-01 | Stop reason: HOSPADM

## 2021-06-01 RX ORDER — HYDRALAZINE HYDROCHLORIDE 20 MG/ML
2.5-5 INJECTION INTRAMUSCULAR; INTRAVENOUS EVERY 10 MIN PRN
Status: DISCONTINUED | OUTPATIENT
Start: 2021-06-01 | End: 2021-06-01 | Stop reason: HOSPADM

## 2021-06-01 RX ORDER — FLUMAZENIL 0.1 MG/ML
0.2 INJECTION, SOLUTION INTRAVENOUS
Status: DISCONTINUED | OUTPATIENT
Start: 2021-06-01 | End: 2021-06-01 | Stop reason: HOSPADM

## 2021-06-01 RX ORDER — MECLIZINE HYDROCHLORIDE 25 MG/1
12.5 TABLET ORAL EVERY MORNING
COMMUNITY
End: 2024-08-18

## 2021-06-01 RX ORDER — CALCIUM CARBONATE 500 MG/1
500 TABLET, CHEWABLE ORAL 4 TIMES DAILY PRN
Status: DISCONTINUED | OUTPATIENT
Start: 2021-06-01 | End: 2021-06-06 | Stop reason: HOSPADM

## 2021-06-01 RX ORDER — METOPROLOL TARTRATE 1 MG/ML
5 INJECTION, SOLUTION INTRAVENOUS EVERY 6 HOURS
Status: DISCONTINUED | OUTPATIENT
Start: 2021-06-02 | End: 2021-06-02

## 2021-06-01 RX ORDER — ESMOLOL HYDROCHLORIDE 10 MG/ML
INJECTION INTRAVENOUS PRN
Status: DISCONTINUED | OUTPATIENT
Start: 2021-06-01 | End: 2021-06-01

## 2021-06-01 RX ORDER — PROCHLORPERAZINE MALEATE 5 MG
5 TABLET ORAL EVERY 6 HOURS PRN
Status: DISCONTINUED | OUTPATIENT
Start: 2021-06-01 | End: 2021-06-04

## 2021-06-01 RX ORDER — ALVIMOPAN 12 MG/1
12 CAPSULE ORAL ONCE
Status: DISCONTINUED | OUTPATIENT
Start: 2021-06-01 | End: 2021-06-01

## 2021-06-01 RX ORDER — SODIUM CHLORIDE, SODIUM LACTATE, POTASSIUM CHLORIDE, CALCIUM CHLORIDE 600; 310; 30; 20 MG/100ML; MG/100ML; MG/100ML; MG/100ML
INJECTION, SOLUTION INTRAVENOUS CONTINUOUS
Status: DISCONTINUED | OUTPATIENT
Start: 2021-06-01 | End: 2021-06-01 | Stop reason: HOSPADM

## 2021-06-01 RX ORDER — ACETAMINOPHEN 325 MG/1
650 TABLET ORAL EVERY 4 HOURS
Status: DISCONTINUED | OUTPATIENT
Start: 2021-06-01 | End: 2021-06-01

## 2021-06-01 RX ORDER — GABAPENTIN 300 MG/1
300 CAPSULE ORAL ONCE
Status: COMPLETED | OUTPATIENT
Start: 2021-06-01 | End: 2021-06-01

## 2021-06-01 RX ADMIN — HYDROMORPHONE HYDROCHLORIDE 0.5 MG: 1 INJECTION, SOLUTION INTRAMUSCULAR; INTRAVENOUS; SUBCUTANEOUS at 18:36

## 2021-06-01 RX ADMIN — PROPOFOL 100 MG: 10 INJECTION, EMULSION INTRAVENOUS at 09:00

## 2021-06-01 RX ADMIN — DOCUSATE SODIUM 50 MG AND SENNOSIDES 8.6 MG 1 TABLET: 8.6; 5 TABLET, FILM COATED ORAL at 19:47

## 2021-06-01 RX ADMIN — Medication 10 MG: at 09:13

## 2021-06-01 RX ADMIN — SODIUM CHLORIDE, POTASSIUM CHLORIDE, SODIUM LACTATE AND CALCIUM CHLORIDE: 600; 310; 30; 20 INJECTION, SOLUTION INTRAVENOUS at 08:39

## 2021-06-01 RX ADMIN — FENTANYL CITRATE 50 MCG: 50 INJECTION, SOLUTION INTRAMUSCULAR; INTRAVENOUS at 15:20

## 2021-06-01 RX ADMIN — ROCURONIUM BROMIDE 20 MG: 10 INJECTION INTRAVENOUS at 14:46

## 2021-06-01 RX ADMIN — Medication 10 MG: at 09:16

## 2021-06-01 RX ADMIN — ROCURONIUM BROMIDE 20 MG: 10 INJECTION INTRAVENOUS at 11:53

## 2021-06-01 RX ADMIN — ACETAMINOPHEN 975 MG: 325 TABLET, FILM COATED ORAL at 19:47

## 2021-06-01 RX ADMIN — METOPROLOL TARTRATE 2 MG: 5 INJECTION INTRAVENOUS at 09:22

## 2021-06-01 RX ADMIN — LIDOCAINE HYDROCHLORIDE 60 MG: 20 INJECTION, SOLUTION INFILTRATION; PERINEURAL at 09:00

## 2021-06-01 RX ADMIN — METOPROLOL TARTRATE 1 MG: 5 INJECTION INTRAVENOUS at 09:41

## 2021-06-01 RX ADMIN — Medication 5 MG: at 10:44

## 2021-06-01 RX ADMIN — FENTANYL CITRATE 50 MCG: 50 INJECTION, SOLUTION INTRAMUSCULAR; INTRAVENOUS at 09:54

## 2021-06-01 RX ADMIN — FENTANYL CITRATE 25 MCG: 50 INJECTION, SOLUTION INTRAMUSCULAR; INTRAVENOUS at 16:51

## 2021-06-01 RX ADMIN — SODIUM CHLORIDE, POTASSIUM CHLORIDE, SODIUM LACTATE AND CALCIUM CHLORIDE: 600; 310; 30; 20 INJECTION, SOLUTION INTRAVENOUS at 09:12

## 2021-06-01 RX ADMIN — ROCURONIUM BROMIDE 10 MG: 10 INJECTION INTRAVENOUS at 10:00

## 2021-06-01 RX ADMIN — HYDROMORPHONE HYDROCHLORIDE 0.2 MG: 1 INJECTION, SOLUTION INTRAMUSCULAR; INTRAVENOUS; SUBCUTANEOUS at 17:24

## 2021-06-01 RX ADMIN — MIDAZOLAM 1 MG: 1 INJECTION INTRAMUSCULAR; INTRAVENOUS at 08:38

## 2021-06-01 RX ADMIN — FENTANYL CITRATE 100 MCG: 50 INJECTION, SOLUTION INTRAMUSCULAR; INTRAVENOUS at 09:00

## 2021-06-01 RX ADMIN — FAMOTIDINE 20 MG: 20 TABLET ORAL at 19:47

## 2021-06-01 RX ADMIN — HYDROMORPHONE HYDROCHLORIDE 0.5 MG: 1 INJECTION, SOLUTION INTRAMUSCULAR; INTRAVENOUS; SUBCUTANEOUS at 22:38

## 2021-06-01 RX ADMIN — ERTAPENEM SODIUM 1 G: 1 INJECTION, POWDER, LYOPHILIZED, FOR SOLUTION INTRAMUSCULAR; INTRAVENOUS at 09:15

## 2021-06-01 RX ADMIN — OXYCODONE HYDROCHLORIDE 5 MG: 5 TABLET ORAL at 23:56

## 2021-06-01 RX ADMIN — ROCURONIUM BROMIDE 20 MG: 10 INJECTION INTRAVENOUS at 11:21

## 2021-06-01 RX ADMIN — SODIUM CHLORIDE: 9 INJECTION, SOLUTION INTRAVENOUS at 16:31

## 2021-06-01 RX ADMIN — ROCURONIUM BROMIDE 50 MG: 10 INJECTION INTRAVENOUS at 09:01

## 2021-06-01 RX ADMIN — ONDANSETRON 4 MG: 2 INJECTION INTRAMUSCULAR; INTRAVENOUS at 15:07

## 2021-06-01 RX ADMIN — HEPARIN SODIUM 5000 UNITS: 5000 INJECTION, SOLUTION INTRAVENOUS; SUBCUTANEOUS at 07:24

## 2021-06-01 RX ADMIN — HYDROMORPHONE HYDROCHLORIDE 0.3 MG: 1 INJECTION, SOLUTION INTRAMUSCULAR; INTRAVENOUS; SUBCUTANEOUS at 17:13

## 2021-06-01 RX ADMIN — SUGAMMADEX 200 MG: 100 INJECTION, SOLUTION INTRAVENOUS at 15:29

## 2021-06-01 RX ADMIN — DOCUSATE SODIUM 100 MG: 100 CAPSULE, LIQUID FILLED ORAL at 19:47

## 2021-06-01 RX ADMIN — ROCURONIUM BROMIDE 20 MG: 10 INJECTION INTRAVENOUS at 13:22

## 2021-06-01 RX ADMIN — BUPIVACAINE HYDROCHLORIDE 20 ML: 2.5 INJECTION, SOLUTION EPIDURAL; INFILTRATION; INTRACAUDAL; PERINEURAL at 07:25

## 2021-06-01 RX ADMIN — ESMOLOL HYDROCHLORIDE 20 MG: 10 INJECTION, SOLUTION INTRAVENOUS at 09:01

## 2021-06-01 RX ADMIN — BUPIVACAINE 20 ML: 13.3 INJECTION, SUSPENSION, LIPOSOMAL INFILTRATION at 07:25

## 2021-06-01 RX ADMIN — FENTANYL CITRATE 50 MCG: 50 INJECTION, SOLUTION INTRAMUSCULAR; INTRAVENOUS at 14:32

## 2021-06-01 RX ADMIN — ERTAPENEM SODIUM 1 G: 1 INJECTION, POWDER, LYOPHILIZED, FOR SOLUTION INTRAMUSCULAR; INTRAVENOUS at 08:54

## 2021-06-01 RX ADMIN — FENTANYL CITRATE 25 MCG: 50 INJECTION, SOLUTION INTRAMUSCULAR; INTRAVENOUS at 16:43

## 2021-06-01 RX ADMIN — FENTANYL CITRATE 25 MCG: 50 INJECTION, SOLUTION INTRAMUSCULAR; INTRAVENOUS at 17:02

## 2021-06-01 RX ADMIN — DEXAMETHASONE SODIUM PHOSPHATE 4 MG: 4 INJECTION, SOLUTION INTRA-ARTICULAR; INTRALESIONAL; INTRAMUSCULAR; INTRAVENOUS; SOFT TISSUE at 09:12

## 2021-06-01 RX ADMIN — GABAPENTIN 300 MG: 300 CAPSULE ORAL at 06:18

## 2021-06-01 RX ADMIN — FENTANYL CITRATE 50 MCG: 50 INJECTION, SOLUTION INTRAMUSCULAR; INTRAVENOUS at 07:18

## 2021-06-01 RX ADMIN — ROCURONIUM BROMIDE 20 MG: 10 INJECTION INTRAVENOUS at 09:41

## 2021-06-01 ASSESSMENT — MIFFLIN-ST. JEOR: SCORE: 1156.75

## 2021-06-01 ASSESSMENT — ACTIVITIES OF DAILY LIVING (ADL): ADLS_ACUITY_SCORE: 15

## 2021-06-01 NOTE — BRIEF OP NOTE
[facility]    Brief Operative Note    Pre-operative diagnosis: 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) [C67.9]  Post-operative diagnosis Same as pre-operative diagnosis    Procedure: Procedure(s):  RADICAL CYSTECTOMY  WITH ILEAL CONDUIT CREATION,BILATERAL PELVIC LYMPHADENECTOMY  Surgeon: Surgeon(s) and Role:     * Leonardo Robles MD - Primary     * Rain Hilario MD - Resident - Assisting     * Inderjit Kelley MD - Resident - Assisting  Anesthesia: Combined General with Block   Estimated blood loss: Less than 100 ml  Drains: Beck-Bautista, RRC, 2 ureteral stents   Specimens:   ID Type Source Tests Collected by Time Destination   A : RIGHT DISTAL URETERAL MARGIN Tissue Ureter, Right SURGICAL PATHOLOGY EXAM Leonardo Robles MD 6/1/2021 10:42 AM    B : LEFT DISTAL URETERAL MARGIN Tissue Ureter, Left SURGICAL PATHOLOGY EXAM Leonardo Robles MD 6/1/2021 10:56 AM    C : BLADDER, ANTERIOR VAGINAL WALL, UTERUS,BILATERAL FALLOPIAN TUBES, OVARIES AND CERVIX Tissue Abdomen SURGICAL PATHOLOGY EXAM Leonardo Robles MD 6/1/2021 11:36 AM    D : Left pelvic lymph node  Tissue Lymph Node, Left Pelvic SURGICAL PATHOLOGY EXAM Leonardo Robles MD 6/1/2021 12:22 PM    E : RIGHT PLEVIC LYMPH NODE Tissue Lymph Node, Right Pelvic SURGICAL PATHOLOGY EXAM Leonardo Robles MD 6/1/2021 12:46 PM    F : FINAL LEFT DISTAL URETERAL MARGIN Tissue Ureter, Left SURGICAL PATHOLOGY EXAM Leonardo Robles MD 6/1/2021  1:53 PM    G : Right distal final ureteral margin Tissue Ureter, Right SURGICAL PATHOLOGY EXAM Leonardo Robles MD 6/1/2021  2:20 PM      Findings:   Straightforward procedure.  Complications: None.  Implants:   Implant Name Type Inv. Item Serial No.  Lot No. LRB No. Used Action   STENT URINARY DIVERSION PERCFLEX SET [id number] -  [id number] Stent STENT URINARY DIVERSION PERCFLEX SET [id number]  [employer] [id number] N/A 2 Implanted

## 2021-06-01 NOTE — ANESTHESIA CARE TRANSFER NOTE
Patient: Michaela Dorman    Procedure(s):  RADICAL CYSTECTOMY  WITH ILEAL CONDUIT CREATION,BILATERAL PELVIC LYMPHADENECTOMY    Diagnosis: 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) [C67.9]  Diagnosis Additional Information: No value filed.    Anesthesia Type:   General     Note:    Oropharynx: spontaneously breathing  Level of Consciousness: drowsy  Oxygen Supplementation: face mask    Independent Airway: airway patency satisfactory and stable  Dentition: dentition unchanged  Vital Signs Stable: post-procedure vital signs reviewed and stable  Report to RN Given: handoff report given  Patient transferred to: PACU    Handoff Report: Identifed the Patient, Identified the Reponsible Provider, Reviewed the pertinent medical history, Discussed the surgical course, Reviewed Intra-OP anesthesia mangement and issues during anesthesia, Set expectations for post-procedure period and Allowed opportunity for questions and acknowledgement of understanding      Vitals: (Last set prior to Anesthesia Care Transfer)  CRNA VITALS  6/1/2021 1515 - 6/1/2021 1558      6/1/2021             EKG:  Sinus bradycardia        Electronically Signed By: LISA Harvey CRNA  June 1, 2021  3:58 PM

## 2021-06-01 NOTE — ANESTHESIA POSTPROCEDURE EVALUATION
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    Anesthesia Type:  General    Note:  Disposition: Admission   Postop Pain Control: Uneventful            Sign Out: Well controlled pain   PONV: No   Neuro/Psych: Uneventful            Sign Out: Acceptable/Baseline neuro status   Airway/Respiratory: Uneventful            Sign Out: Acceptable/Baseline resp. status   CV/Hemodynamics: Uneventful            Sign Out: Acceptable CV status; No obvious hypovolemia; No obvious fluid overload   Other NRE: NONE   DID A NON-ROUTINE EVENT OCCUR? No           Last vitals:  Vitals:    06/01/21 1615 06/01/21 1630 06/01/21 1645   BP: 131/65 (!) 145/71 (!) 157/77   Pulse: 63 63 64   Resp: 15 14 15   Temp:      SpO2: 100% 100% 99%       Last vitals prior to Anesthesia Care Transfer:  CRNA VITALS  6/1/2021 1515 - 6/1/2021 1615      6/1/2021             EKG:  Sinus bradycardia          Electronically Signed By: Dixie Warren MD  June 1, 2021  5:01 PM

## 2021-06-01 NOTE — ANESTHESIA PROCEDURE NOTES
TAP Procedure Note  Pre-Procedure   Staff -        Anesthesiologist:  Dennis Marks MD       Resident/Fellow: Imelda Palomares MD       Performed By: with residents       Procedure performed by resident/fellow/CRNA in presence of a teaching physician.         Location: pre-op       Procedure Start/Stop Times: 6/1/2021 7:25 AM       Pre-Anesthestic Checklist: patient identified, IV checked, site marked, risks and benefits discussed, informed consent, monitors and equipment checked, pre-op evaluation, at physician/surgeon's request and post-op pain management  Timeout:       Correct Patient: Yes        Correct Procedure: Yes        Correct Site: Yes        Correct Position: Yes        Correct Laterality: Yes        Site Marked: Yes  Procedure Documentation  Procedure: TAP       Diagnosis: POST OPERATIVE PAIN       Laterality: bilateral       Patient Position: supine       Patient Prep/Sterile Barriers: sterile gloves, mask       Skin prep: Chloraprep       Needle Type: short bevel       Needle Gauge: 21.        Needle Length (millimeters): 110        Ultrasound guided       1. Ultrasound was used to identify targeted nerve, plexus, vascular marker, or fascial plane and place a needle adjacent to it in real-time.       2. Ultrasound was used to visualize the spread of anesthetic in close proximity to the above referenced structure.       3. A permanent image is entered into the patient's record.    Assessment/Narrative         The placement was negative for: blood aspirated, painful injection and site bleeding       Paresthesias: No.     Bolus given via needle..        Secured via.        Insertion/Infusion Method: Single Shot       Complications: none       Injection made incrementally with aspirations every 5 mL.    Medication(s) Administered   Bupivacaine 0.25% PF (Infiltration), 20 mL  Bupivacaine liposome (Exparel) 1.3% LA inj susp (Infiltration), 20 mL  Medication Administration Time: 6/1/2021  7:25 AM

## 2021-06-01 NOTE — PROGRESS NOTES
Dr. Euceda text paged regarding abd x-ray being done and labs being drawn, asked to please call this nurse back if any intervention needed. No phone call received while patient in PACU.

## 2021-06-01 NOTE — PLAN OF CARE
Pt settled on floor from pacu around 1830. VS stable on room air. Pt turned for skin check. Pain medication was given per patient pain rating of 8 and water was provided.    Admitted/transferred from: PACU  2 RN skin assessment completed by Lydia Etienne, RN and COLE Mariano.  Skin assessment finding: Midline abdominal incision stapled with primapore covering, some small sanguinous leakage from urostomy reinforced with gauze and tape, new ileal conduit on the R with 2 stents and a red KAI tucker TITI with sanguinous output, other areas of skin unremarkable, no concerns per pt   Interventions/actions: pt educated on repositioning, hydration provided    Will continue to monitor.

## 2021-06-01 NOTE — PROGRESS NOTES
Spiritual Health Services Progress Note  [facility], [address]      Provided chaplaincy care with Michaela and her  (Yefri) prior to her surgery, per her request. Briefly talked about her situation. Along with praying for her, family, surgical team, she specifically mentioned her request to pray for not only the physical aspect of her recovery but addressing what it means for her. Their Presybeterian michael was affirmed to be significant for coping. Prayer was shared.     I have no plan for follow-up today. She is open to continued chaplaincy care during her admission.     Chaplain Damien Parker, MPH, MDiv, [facility]   [phone]

## 2021-06-01 NOTE — ANESTHESIA PROCEDURE NOTES
Airway       Patient location during procedure: OR  Staff -        Anesthesiologist:  Virgen Mina MD       Resident/Fellow: Khai Chaudhari MD       Performed By: resident  Consent for Airway        Urgency: elective  Indications and Patient Condition       Indications for airway management: lorrie-procedural       Induction type:intravenous       Mask difficulty assessment: 1 - vent by mask    Final Airway Details       Final airway type: endotracheal airway       Successful airway: ETT - single  Endotracheal Airway Details        ETT size (mm): 7.0       Cuffed: yes       Successful intubation technique: direct laryngoscopy       DL Blade Type: MAC 3       Grade View of Cords: 2       Adjucts: bougie       Position: Right       Measured from: lips       Secured at (cm): 22    Post intubation assessment        Placement verified by: capnometry and chest rise        Number of attempts at approach: 2 (Anterior airway. Grade 2b)       Secured with: pink tape and silk tape       Ease of procedure: easy       Dentition: Intact and Unchanged    Medication(s) Administered   Medication Administration Time: 6/1/2021 9:09 AM

## 2021-06-01 NOTE — ANESTHESIA PROCEDURE NOTES
Arterial Line Procedure Note  Pre-Procedure   Staff -        Anesthesiologist:  Virgen Mina MD       Resident/Fellow: Khai Chaudhari MD       Performed By: resident       Location: OR       Pre-Anesthestic Checklist: patient identified, IV checked, risks and benefits discussed, informed consent, monitors and equipment checked, pre-op evaluation and at physician/surgeon's request  Procedure   Procedure: arterial line       Laterality: right       Insertion Site: radial.  Sterile Prep        Standard elements of sterile barrier followed       Skin prep: Chloraprep  Insertion/Injection        Catheter Type/Size: 20 G, 12 cm  Narrative         Secured by: other       Tegaderm dressing used.       Complications: None apparent,      Arterial waveform: Yes

## 2021-06-02 ENCOUNTER — APPOINTMENT (OUTPATIENT)
Dept: OCCUPATIONAL THERAPY | Facility: CLINIC | Age: 71
DRG: 655 | End: 2021-06-02
Attending: UROLOGY
Payer: MEDICARE

## 2021-06-02 ENCOUNTER — APPOINTMENT (OUTPATIENT)
Dept: PHYSICAL THERAPY | Facility: CLINIC | Age: 71
DRG: 655 | End: 2021-06-02
Attending: UROLOGY
Payer: MEDICARE

## 2021-06-02 LAB
ANION GAP SERPL CALCULATED.3IONS-SCNC: 4 MMOL/L (ref 3–14)
BUN SERPL-MCNC: 21 MG/DL (ref 7–30)
CALCIUM SERPL-MCNC: 8.4 MG/DL (ref 8.5–10.1)
CHLORIDE SERPL-SCNC: 108 MMOL/L (ref 94–109)
CO2 SERPL-SCNC: 25 MMOL/L (ref 20–32)
CREAT SERPL-MCNC: 1.12 MG/DL (ref 0.52–1.04)
ERYTHROCYTE [DISTWIDTH] IN BLOOD BY AUTOMATED COUNT: 13.2 % (ref 10–15)
GFR SERPL CREATININE-BSD FRML MDRD: 49 ML/MIN/{1.73_M2}
GLUCOSE SERPL-MCNC: 105 MG/DL (ref 70–99)
HCT VFR BLD AUTO: 25.3 % (ref 35–47)
HGB BLD-MCNC: 7.5 G/DL (ref 11.7–15.7)
HGB BLD-MCNC: 8.1 G/DL (ref 11.7–15.7)
MCH RBC QN AUTO: 32.9 PG (ref 26.5–33)
MCHC RBC AUTO-ENTMCNC: 32 G/DL (ref 31.5–36.5)
MCV RBC AUTO: 103 FL (ref 78–100)
PLATELET # BLD AUTO: 328 10E9/L (ref 150–450)
POTASSIUM SERPL-SCNC: 4.5 MMOL/L (ref 3.4–5.3)
RBC # BLD AUTO: 2.46 10E12/L (ref 3.8–5.2)
SODIUM SERPL-SCNC: 137 MMOL/L (ref 133–144)
WBC # BLD AUTO: 15.9 10E9/L (ref 4–11)

## 2021-06-02 PROCEDURE — 250N000013 HC RX MED GY IP 250 OP 250 PS 637: Performed by: PHYSICIAN ASSISTANT

## 2021-06-02 PROCEDURE — 250N000013 HC RX MED GY IP 250 OP 250 PS 637: Performed by: STUDENT IN AN ORGANIZED HEALTH CARE EDUCATION/TRAINING PROGRAM

## 2021-06-02 PROCEDURE — 85018 HEMOGLOBIN: CPT | Performed by: PHYSICIAN ASSISTANT

## 2021-06-02 PROCEDURE — 258N000003 HC RX IP 258 OP 636: Performed by: STUDENT IN AN ORGANIZED HEALTH CARE EDUCATION/TRAINING PROGRAM

## 2021-06-02 PROCEDURE — 36415 COLL VENOUS BLD VENIPUNCTURE: CPT | Performed by: STUDENT IN AN ORGANIZED HEALTH CARE EDUCATION/TRAINING PROGRAM

## 2021-06-02 PROCEDURE — 36415 COLL VENOUS BLD VENIPUNCTURE: CPT | Performed by: PHYSICIAN ASSISTANT

## 2021-06-02 PROCEDURE — 85027 COMPLETE CBC AUTOMATED: CPT | Performed by: STUDENT IN AN ORGANIZED HEALTH CARE EDUCATION/TRAINING PROGRAM

## 2021-06-02 PROCEDURE — 97161 PT EVAL LOW COMPLEX 20 MIN: CPT | Mod: GP | Performed by: REHABILITATION PRACTITIONER

## 2021-06-02 PROCEDURE — 97165 OT EVAL LOW COMPLEX 30 MIN: CPT | Mod: GO | Performed by: OCCUPATIONAL THERAPIST

## 2021-06-02 PROCEDURE — 80048 BASIC METABOLIC PNL TOTAL CA: CPT | Performed by: STUDENT IN AN ORGANIZED HEALTH CARE EDUCATION/TRAINING PROGRAM

## 2021-06-02 PROCEDURE — 97535 SELF CARE MNGMENT TRAINING: CPT | Mod: GO | Performed by: OCCUPATIONAL THERAPIST

## 2021-06-02 PROCEDURE — 97530 THERAPEUTIC ACTIVITIES: CPT | Mod: GO | Performed by: OCCUPATIONAL THERAPIST

## 2021-06-02 PROCEDURE — 250N000011 HC RX IP 250 OP 636: Performed by: STUDENT IN AN ORGANIZED HEALTH CARE EDUCATION/TRAINING PROGRAM

## 2021-06-02 PROCEDURE — 120N000002 HC R&B MED SURG/OB UMMC

## 2021-06-02 PROCEDURE — 97116 GAIT TRAINING THERAPY: CPT | Mod: GP | Performed by: REHABILITATION PRACTITIONER

## 2021-06-02 PROCEDURE — 999N000199 HC STATISTIC WOC PT EDUCATION, 31-45 MIN

## 2021-06-02 PROCEDURE — 97530 THERAPEUTIC ACTIVITIES: CPT | Mod: GP | Performed by: REHABILITATION PRACTITIONER

## 2021-06-02 PROCEDURE — G0463 HOSPITAL OUTPT CLINIC VISIT: HCPCS

## 2021-06-02 RX ORDER — METHOCARBAMOL 750 MG/1
750 TABLET, FILM COATED ORAL 4 TIMES DAILY PRN
Status: DISCONTINUED | OUTPATIENT
Start: 2021-06-02 | End: 2021-06-06 | Stop reason: HOSPADM

## 2021-06-02 RX ORDER — METOPROLOL TARTRATE 1 MG/ML
5 INJECTION, SOLUTION INTRAVENOUS EVERY 6 HOURS PRN
Status: DISCONTINUED | OUTPATIENT
Start: 2021-06-02 | End: 2021-06-06 | Stop reason: HOSPADM

## 2021-06-02 RX ADMIN — ATORVASTATIN CALCIUM 20 MG: 20 TABLET, FILM COATED ORAL at 08:41

## 2021-06-02 RX ADMIN — HYDROMORPHONE HYDROCHLORIDE 0.5 MG: 1 INJECTION, SOLUTION INTRAMUSCULAR; INTRAVENOUS; SUBCUTANEOUS at 01:45

## 2021-06-02 RX ADMIN — ACETAMINOPHEN 975 MG: 325 TABLET, FILM COATED ORAL at 10:50

## 2021-06-02 RX ADMIN — OXYCODONE HYDROCHLORIDE 10 MG: 5 TABLET ORAL at 13:22

## 2021-06-02 RX ADMIN — DOCUSATE SODIUM 100 MG: 100 CAPSULE, LIQUID FILLED ORAL at 19:28

## 2021-06-02 RX ADMIN — ACETAMINOPHEN 975 MG: 325 TABLET, FILM COATED ORAL at 19:28

## 2021-06-02 RX ADMIN — SODIUM CHLORIDE: 9 INJECTION, SOLUTION INTRAVENOUS at 16:30

## 2021-06-02 RX ADMIN — PROCHLORPERAZINE MALEATE 5 MG: 5 TABLET ORAL at 23:54

## 2021-06-02 RX ADMIN — HYDROMORPHONE HYDROCHLORIDE 0.5 MG: 1 INJECTION, SOLUTION INTRAMUSCULAR; INTRAVENOUS; SUBCUTANEOUS at 16:28

## 2021-06-02 RX ADMIN — HEPARIN SODIUM 5000 UNITS: 5000 INJECTION, SOLUTION INTRAVENOUS; SUBCUTANEOUS at 10:51

## 2021-06-02 RX ADMIN — METHOCARBAMOL 750 MG: 750 TABLET ORAL at 13:22

## 2021-06-02 RX ADMIN — ACETAMINOPHEN 975 MG: 325 TABLET, FILM COATED ORAL at 04:00

## 2021-06-02 RX ADMIN — DOCUSATE SODIUM 100 MG: 100 CAPSULE, LIQUID FILLED ORAL at 08:42

## 2021-06-02 RX ADMIN — PROCHLORPERAZINE MALEATE 5 MG: 5 TABLET ORAL at 05:16

## 2021-06-02 RX ADMIN — FAMOTIDINE 20 MG: 20 TABLET ORAL at 08:42

## 2021-06-02 RX ADMIN — METHOCARBAMOL 750 MG: 750 TABLET ORAL at 22:31

## 2021-06-02 RX ADMIN — ESCITALOPRAM OXALATE 30 MG: 20 TABLET ORAL at 08:42

## 2021-06-02 RX ADMIN — POLYETHYLENE GLYCOL 3350 17 G: 17 POWDER, FOR SOLUTION ORAL at 08:43

## 2021-06-02 RX ADMIN — DOCUSATE SODIUM 50 MG AND SENNOSIDES 8.6 MG 1 TABLET: 8.6; 5 TABLET, FILM COATED ORAL at 08:42

## 2021-06-02 RX ADMIN — HYDROMORPHONE HYDROCHLORIDE 0.5 MG: 1 INJECTION, SOLUTION INTRAMUSCULAR; INTRAVENOUS; SUBCUTANEOUS at 10:16

## 2021-06-02 RX ADMIN — OXYCODONE HYDROCHLORIDE 10 MG: 5 TABLET ORAL at 17:39

## 2021-06-02 RX ADMIN — OXYCODONE HYDROCHLORIDE 10 MG: 5 TABLET ORAL at 08:41

## 2021-06-02 RX ADMIN — DOCUSATE SODIUM 50 MG AND SENNOSIDES 8.6 MG 1 TABLET: 8.6; 5 TABLET, FILM COATED ORAL at 19:28

## 2021-06-02 RX ADMIN — OXYCODONE HYDROCHLORIDE 10 MG: 5 TABLET ORAL at 04:00

## 2021-06-02 RX ADMIN — OXYCODONE HYDROCHLORIDE 10 MG: 5 TABLET ORAL at 22:27

## 2021-06-02 RX ADMIN — OMEPRAZOLE 20 MG: 20 CAPSULE, DELAYED RELEASE ORAL at 11:25

## 2021-06-02 RX ADMIN — HYDROMORPHONE HYDROCHLORIDE 0.5 MG: 1 INJECTION, SOLUTION INTRAMUSCULAR; INTRAVENOUS; SUBCUTANEOUS at 23:54

## 2021-06-02 ASSESSMENT — ACTIVITIES OF DAILY LIVING (ADL)
ADLS_ACUITY_SCORE: 15
IADL_COMMENTS: OT: PT WAS IND
ADLS_ACUITY_SCORE: 15
ADLS_ACUITY_SCORE: 15

## 2021-06-02 ASSESSMENT — MIFFLIN-ST. JEOR: SCORE: 1194.46

## 2021-06-02 NOTE — PLAN OF CARE
"/55 (BP Location: Right arm)   Pulse 84   Temp 98.9  F (37.2  C) (Axillary)   Resp 16   Ht 1.676 m (5' 6\")   Wt 62.5 kg (137 lb 12.6 oz)   SpO2 92%   BMI 22.24 kg/m      Reason for admission: RADICAL CYSTECTOMY  WITH ILEAL CONDUIT CREATION,BILATERAL PELVIC LYMPHADENECTOMY due to Urothelial carcinoma of bladder with invasion of the muscle.     Neuro: A&Ox4.   Cardiac: SR. VSS.   Respiratory: Sating 92% on RA.  GI/:No bm.  Adequate urine output.   Diet/appetite: Tolerating clear liquid diet. Eating well.  Activity:  Assist of one up to chair and in halls.  Pain: At acceptable level on current regimen.   Skin: No new deficits noted.  LDA's:Port a cath and PIV. TITI and urostomy patent.  New this shift: PT/OT worked with the pt.  Plan: Continue with POC. Notify primary team with changes.  "

## 2021-06-02 NOTE — OP NOTE
OPERATIVE REPORT  6/1/2021     PREPROCEDURE DIAGNOSIS:   1. Muscle-invasive bladder cancer     POSTPROCEDURE DIAGNOSIS:   1. Muscle-invasive bladder cancer     PROCEDURE PERFORMED:   1. Anterior Pelvic Exenteration  2. Bilateral Pelvic Lymphadenectomy  3. Ileal Conduit Urinary Diversion     SURGEON: Leonardo Robles MD   ASSISTANT(S):  Felicia Euceda MD; Rain Hilario MD; Inderjit Kelley MD  ANESTHESIA:  General with endotracheal intubation.   EBL:  100 ml    FINDINGS:   Distal bilateral ureteral margins negative for malignancy.  No grossly abnormal lymphatic tissue present on bilateral pelvic lymph node dissection.  Uncomplicated creation of ileal conduit with negative leak test.    DRAINS:     19-Yakut round Ramiro drain to bulb suction                       7-Fr single-J stents x2                      16 Fr red-rubber per stoma    SPECIMENS:   ID Type Source Tests Collected by Time Destination   A : RIGHT DISTAL URETERAL MARGIN Tissue Ureter, Right SURGICAL PATHOLOGY EXAM Leonardo Robles MD 6/1/2021 10:42 AM    B : LEFT DISTAL URETERAL MARGIN Tissue Ureter, Left SURGICAL PATHOLOGY EXAM Leonardo Robles MD 6/1/2021 10:56 AM    C : BLADDER, ANTERIOR VAGINAL WALL, UTERUS,BILATERAL FALLOPIAN TUBES, OVARIES AND CERVIX Tissue Abdomen SURGICAL PATHOLOGY EXAM Leonardo Robles MD 6/1/2021 11:36 AM    D : Left pelvic lymph node  Tissue Lymph Node, Left Pelvic SURGICAL PATHOLOGY EXAM Leonardo Robles MD 6/1/2021 12:22 PM    E : RIGHT PLEVIC LYMPH NODE Tissue Lymph Node, Right Pelvic SURGICAL PATHOLOGY EXAM Leonardo Robles MD 6/1/2021 12:46 PM    F : FINAL LEFT DISTAL URETERAL MARGIN Tissue Ureter, Left SURGICAL PATHOLOGY EXAM Leonardo Robles MD 6/1/2021  1:53 PM    G : Right distal final ureteral margin Tissue Ureter, Right SURGICAL PATHOLOGY EXAM Leonardo Robles MD 6/1/2021  2:20 PM                        INDICATIONS:    Ms.  Michaela Dorman is a(n) 70 year old female with a history of muscle invasive bladder cancer.  She had one cycle of neoadjuvant chemotherapy, however had significant TAMIR and decision was made to forego further chemotherapy. After a thorough discussion of risks, benefits, and alternatives, she elected to undergo the above procedures.     DESCRIPTION OF PROCEDURE: After fully informed voluntary consent was obtained, the patient was brought into the operating room, properly identified and placed in a supine position on the operating table. General anesthesia was induced, endotracheal intubation performed, & IV antibiotics were administered. She was repositioned into the dorsal lithotomy position.  The abdomen was shaved, prepped and draped in the usual sterile fashion. A timeout was performed to confirm correct patient, procedure, and laterality.  A Quinones catheter was placed in the bladder in a sterile manner.  A midline infraumbilical incision was made with a scalpel.  This was taken through the anterior rectus fascia and the peritoneal cavity was entered.  The bladder was mobilized off the pelvic sidewall on either side.     Beginning on the right side, the peritoneum overlying the retroperitoneal space was opened at the level of the right ureter.  This was dissected proximally to the level of the iliac vessels and distal to the level of the bladder.  At the level of the bladder, the right ureter was clamped, divided and the distal end ligated with a 2-0 silk ligature.  A distal margin was sent for pathology (which ultimately returned negative for malignancy) and the proximal end was tagged with 3-0 Vicryl suture. A similar procedure was performed on the contralateral side, again with the distal margin of the left ureter negative for malignancy.      We then focused our dissection in the cul de sac inferior and posterior to where the ureteral stumps were ligated and opened the peritoneum in this location.  A sponge  stick was placed into the vagina for retraction.  Electrocautery was used to open the vagina just posterior to the bladder and posterior to the cervix. The posterior and lateral pedicles, including the bladder and uterine vessles were then divided with the LigaSure electrocautery, first on the right side and then on the left side. Ovarian and uterine vessels were divided with the Ligasure with uterus with cervicx and ovaries/fallopian tubes were removed en bloc with the specimen     The urethra was divided.  The catheter was clipped outside the body and brought through the urethra.  The specimen, including the bladder, uterus, cervix, ovaries, and remnant fallopian tubes was removed and passed off.  The anterior vaginal defect was closed with 0 Vicryl in a running fashion. The abdomen was then irrigated with 2L sterile water.   All bleeding points carefully controlled. We then performed a pelvic lymph node dissection on both sides by removing all lymph node tissue between the genitofemoral nerve, obturator nerve distally down to the inguinal ligament and cephalad up to above the level of the bifurcation of common iliac vessels.  This was first done on the patient's left side and subsequently on the patient's right side.     We then repositioned the retractors, identified the terminal ileum and made mesenteric windows to isolate our segment of ileum for the ileal conduit. We isolated about a 15-cm segment. We then divided the mesentery at either end of this isolated segment of ileum using the LigaSure device. The bowel itself was divided using a 75 mm stapler. Bowel continuity was then restored in a standard fashion using a side-to-side anastomosis with a single fire of 75 mm stapler and horizontal fire of the TA 60 staplers. The crotch was reinforced with 3-0 silk sutures ×2.  The horizontal staple line was reinforced with running 3-0 Vicryl suture.      We then brought the left ureter behind the sigmoid mesentery  to the right side.  The conduit segment itself was irrigated to remove any stool that was present.  Both ureters were spatulated and anastomosed to the ileal conduit using running 4.0 PDS suture with interrupted sutures at the apex.  Before completing the ureteric anastomosis, we placed a 6F urinary diversion stent in each ureter.  The stents were secured to the mucosa of the conduit using 4.0 chromic suture.  The conduit was irrigated to ensure that there were no leaks at the anastomosis level; none were seen.   We turned our attention to the RLQ.  We excised a circular disk of skin lateral to the umbilicus on the right side at the previously marked stoma site. We incised the fascia in a cruciate manner and the posterior rectus fascia was also incised in a vertical manner.  Fascial sutures of 2-0 Vicryl ×3 were preplaced.  Through this opening, we pulled out the stomal end of the ileal conduit and fashioned a standard rosebud stoma. The fascial sutures of 2-0 Vicryl were also placed through the serosa of the bowel. Once an everted stoma had been fashioned, we again inspected the abdominal cavity.  Hemostasis had been achieved.  We placed a 19-Ramiro drain in the pelvis which was secured to the skin with nylon suture. The rectus fascia was reapproximated using running #0-looped PDS suture.  A 16F red rubber was placed in the conduit to drain it and the skin was closed using staples. The patient tolerated the procedure well. There were no complications. Estimated blood loss was 100 mL.      All sponge, needle and instrument counts were correct and doubly verified prior to closure. The patient was taken to the PACU without any difficulty.      Leonardo Robles MD  Urology  [facility]

## 2021-06-02 NOTE — PROGRESS NOTES
"Urology  Progress Note    AF, AVSN  Pain well controlled with meds  Stood at edge of bed without dizziness  Night was \"pretty good\"  Tolerating CLD, no nausea/vomiting or abdominal distension  Does endorse L hand numbness upon waking, improving    Exam  /53 (BP Location: Right arm)   Pulse 76   Temp 97.4  F (36.3  C) (Oral)   Resp 13   Ht 1.676 m (5' 6\")   Wt 62.5 kg (137 lb 12.6 oz)   SpO2 95%   BMI 22.24 kg/m    No acute distress  Unlabored breathing  Abdomen soft, nontender, nondistended. Stoma pink and healthy with red rubber catheter and stents in place, cherry urine in tubing  Incision closed with staples, covered with island dressing without strikethrough  TITI serosanguinous    /700  TITI 300/0    Labs (6/1)  WBC 32.4  Hgb 9.6  Cr 0.98    AM labs pending    Assessment/Plan  70 year old y/o female with PMHx DL, HTN, GERD, CRI (baseline Cr 1), breast cancer s/p chemoradiation and muscle invasive bladder cancer s/p neoadjuvant chemotherpay POD#1 s/p radical cystectomy with ileal conduit and BPLND.    Neuro: continue scheduled and PRN tylenol, PRN oxycodone and dilaudid for pain control. Continue PTA lexapro, PRN xanax, PRN meclizine  CV: no acute concerns - PRN metoprolol for HTN  Pulm: incentive spirometry while awake  FEN/GI: clear liquid diet, MIVF @ 75/hr. Senna/doc and miralax with opiates; no entereg given chronic pain. PRN compaxzine and zofran  Endo: no acute concerns  : continue red rubber catheter until ROBF; continue bilateral ureteral stents. [facility] consult for stoma teaching  Heme/ID: [id number] TID pending AM Hg  Activity: as tolerated  PPx: SCDs    Seen and examined with the chief resident. Will discuss with Dr. Robles.    Sue Quinteros MD, PGY-2  Urology Resident     Contacting the Urology Team     Please use the following job codes to reach the Urology Team. Note that you must use an in house phone and that job codes cannot receive text pages.     On weekdays, dial " [phone] (or star-star-star [phone] on the new [place] telephones) then [phone] to reach the Adult Urology resident or PA on call    On weekdays, dial [phone] (or star-star-star 777 on the new [place] telephones) then [phone] to reach the Pediatric Urology resident    On weeknights and weekends, dial [phone] (or star-star-star 777 on the new [place] telephones) then [phone] to reach the Urology resident on call (for both Adult and Pediatrics)

## 2021-06-02 NOTE — PROGRESS NOTES
"/62 (BP Location: Right arm)   Pulse 94   Temp 98.8  F (37.1  C) (Oral)   Resp 13   Ht 1.676 m (5' 6\")   Wt 62.5 kg (137 lb 12.6 oz)   SpO2 94%   BMI 22.24 kg/m       Neuro: patient alert and oriented   Cardiac: Wnl, denies chest pain   Respiratory: 2l of O2, Capno    GI/:Urostomy with sero ouput  Diet/Appetite:clear liquid diet  Skin:patients midline incision intact with dressing.  LDA:chest port with 75cc Running. Patient with urostomy with red tucker  Activity:patient Assist of 1. Awaiting to get up of bed  Pain:pain controlled with ov dilaudid and tylenol  Plan:Patient appears to rest between care.  "

## 2021-06-02 NOTE — PLAN OF CARE
Vital signs:  Temp: 97.4  F (36.3  C) Temp src: Oral BP: 122/53 Pulse: 76   Resp: 13 SpO2: 95 % O2 Device: Nasal cannula Oxygen Delivery: 1 LPM     Activity: Up with Ax1. Dangled and marched at bedside.  Neuros: A&O x4.   Cardiac: WDL.   Respiratory: On 1L O2 via NC. Capno on.   GI/: Urostomy with 2 stents and RR, adequate cherry colored UOP. Hypo BS, -flatus.   Diet: Clear.   Lines: Right single Port infusing IVMF. Right PIV SL.   Incisions/Drains: Midline incision with dressing c/d/i.   Pain/nausea: Pain managed with dilaudid and oxy. Denies nausea.  New changes this shift: Continues to have numbness to RUE, pt states it is improving in her fingers.

## 2021-06-02 NOTE — PROGRESS NOTES
06/02/21 0900   Quick Adds   Type of Visit Initial Occupational Therapy Evaluation   Living Environment   People in home spouse   Current Living Arrangements house   Home Accessibility stairs to enter home;stairs within home   Number of Stairs, Main Entrance 2;other (see comments)  (ramp and stairs to enter home from front)   Stair Railings, Main Entrance railing on right side (ascending)   Number of Stairs, Within Home, Primary   (one flight to basement, pt doesnt use)   Transportation Anticipated car, drives self;family or friend will provide   Self-Care   Usual Activity Tolerance good   Current Activity Tolerance fair   Regular Exercise Yes   Disability/Function   Hearing Difficulty or Deaf no   Wear Glasses or Blind no   Concentrating, Remembering or Making Decisions Difficulty no   Difficulty Communicating no   Difficulty Eating/Swallowing no   Walking or Climbing Stairs Difficulty yes   Dressing/Bathing Difficulty no   Toileting issues no   Doing Errands Independently Difficulty (such as shopping) yes   Fall history within last six months no   Change in Functional Status Since Onset of Current Illness/Injury yes   General Information   Onset of Illness/Injury or Date of Surgery 06/01/21   Referring Physician Felicia Euceda MD   Additional Occupational Profile Info/Pertinent History of Current Problem 70 year old y/o female with PMHx DL, HTN, GERD, CRI (baseline Cr 1), breast cancer s/p chemoradiation and muscle invasive bladder cancer s/p neoadjuvant chemotherpay POD#1 s/p radical cystectomy with ileal conduit and BPLND.   Existing Precautions/Restrictions fall;abdominal   Left Upper Extremity (Weight-bearing Status)   (10# lifting restriction)   Right Upper Extremity (Weight-bearing Status)   (10# lifting restriction)   Cognitive Status Examination   Cognitive Status Comments OT: No concerns at this time   Range of Motion Comprehensive   Comment, General Range of Motion OT: WFL   Strength  Comprehensive (MMT)   Comment, General Manual Muscle Testing (MMT) Assessment OT: NT 2/2 precautions   Bed Mobility   Comment (Bed Mobility) OT: SBA after education   Transfers   Transfer Comments OT SBA-CGA after education   Balance   Balance Comments  OT: SBA-min A    Instrumental Activities of Daily Living (IADL)   IADL Comments OT: Pt was ind   Clinical Impression   Criteria for Skilled Therapeutic Interventions Met (OT) yes   OT Diagnosis decreased ind in I/ADLs   OT Problem List-Impairments impacting ADL problems related to;activity tolerance impaired;balance;strength;mobility;pain;post-surgical precautions   ADL comments/analysis decreased ind in I/ADLs   Assessment of Occupational Performance 1-3 Performance Deficits   Planned Therapy Interventions (OT) ADL retraining;IADL retraining;bed mobility training;strengthening;transfer training;home program guidelines;progressive activity/exercise   Clinical Decision Making Complexity (OT) low complexity   Therapy Frequency (OT) daily   Predicted Duration of Therapy 6/12/21   Risk & Benefits of therapy have been explained evaluation/treatment results reviewed;care plan/treatment goals reviewed;patient   OT Discharge Planning    OT Discharge Recommendation (DC Rec) Transitional Care Facility   OT Rationale for DC Rec to increase ind in I/ADLS   Total Evaluation Time (Minutes)   Total Evaluation Time (Minutes) 4

## 2021-06-02 NOTE — PROGRESS NOTES
"CLINICAL NUTRITION SERVICES - ASSESSMENT NOTE     Nutrition Prescription    RECOMMENDATIONS FOR MDs/PROVIDERS TO ORDER:  None at this time     Malnutrition Status:    Patient does not meet two of the established criteria necessary for diagnosing malnutrition but is at risk for malnutrition    Recommendations already ordered by Registered Dietitian (RD):  Ensure clear twice daily     Future/Additional Recommendations:  -- monitor oral intake of meals and supplements  -- when diet > full liquids change supplement to ensure max protein (chcocolate flavor)      REASON FOR ASSESSMENT  Michaela Dorman is a/an 70 year old female assessed by the dietitian for Provider Order - Dietitian to Assess and Order per Nutrition Protocol. Provider is responsible for nutrition oversight.    Per chart review patient with a history of  HLD, HTN, carotid stenosis s/p left CEA, GERD, spinal stenosis, s/p back surgery and s/p right reverse shoulder, depression. and anxiety. 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 Anterior Pelvic Exenteration, 2. Bilateral Pelvic Lymphadenectomy, 3. Ileal Conduit Urinary Diversion    NUTRITION HISTORY  Per patient appetite was good PTA. She took ensure (light) at home. She prefers chocolate flavor. No recent weight loss.     CURRENT NUTRITION ORDERS  Diet: Clear Liquid    LABS  Labs reviewed    MEDICATIONS  Medications reviewed    ANTHROPOMETRICS  Height: 167.6 cm (5' 6\")  Most Recent Weight: 62.5 kg (137 lb 12.6 oz)    IBW: 59 kg  BMI: Normal BMI  Weight History:   Wt Readings from Last 10 Encounters:   06/01/21 62.5 kg (137 lb 12.6 oz)   05/21/21 64.3 kg (141 lb 11.2 oz)   05/20/21 63.8 kg (140 lb 9.6 oz)   05/19/21 62.3 kg (137 lb 6.4 oz)   05/18/21 62.6 kg (138 lb)   05/12/21 62.5 kg (137 lb 12.8 oz)   05/07/21 63.8 kg (140 lb 11.2 oz)   05/04/21 64.4 kg (141 lb 14.4 oz) "   05/03/21 64.9 kg (143 lb)   04/27/21 62.2 kg (137 lb 1.6 oz)   weight appears stable   Dosing Weight: 63 kg (admit weight)     ASSESSED NUTRITION NEEDS  Estimated Energy Needs: 3887-1548 kcals/day (25 - 30 kcals/kg)  Justification: Maintenance  Estimated Protein Needs: 76-95 grams protein/day (1.2 - 1.5 grams of pro/kg)  Justification: Increased needs  Estimated Fluid Needs: (1 mL/kcal)   Justification: Maintenance    PHYSICAL FINDINGS  See malnutrition section below.    MALNUTRITION  % Intake: No decreased intake noted  % Weight Loss: None noted  Subcutaneous Fat Loss: None observed  Muscle Loss: None observed  Fluid Accumulation/Edema: None noted  Malnutrition Diagnosis: Patient does not meet two of the established criteria necessary for diagnosing malnutrition but is at risk for malnutrition    NUTRITION DIAGNOSIS  Increased nutrient needs protein related to s/p surgery as evidenced by increased protein needs for healing      INTERVENTIONS  Implementation  Nutrition Education: See education flowsheet   Medical food supplement therapy- ensure clear at 10 am and dinner     Goals  Diet adv v nutrition support within 2-3 days.     Monitoring/Evaluation  Progress toward goals will be monitored and evaluated per protocol.    Denise Ware, MS/RD/LD/CNSC  [address] RD pager [phone]

## 2021-06-02 NOTE — PROGRESS NOTES
06/02/21 1100   Quick Adds   Type of Visit Initial PT Evaluation   Living Environment   People in home spouse   Current Living Arrangements house   Home Accessibility stairs to enter home   Number of Stairs, Main Entrance 2;other (see comments)  (also has ramp)   Stair Railings, Main Entrance railing on right side (ascending)   Transportation Anticipated car, drives self;family or friend will provide   Living Environment Comments pt lives in single story house with basement she does not need to access. pt tends to horses/rides horses.   Self-Care   Usual Activity Tolerance good   Current Activity Tolerance fair   Regular Exercise Yes   Activity/Exercise Type walking;swimming  (yoga)   Exercise Amount/Frequency daily   Equipment Currently Used at Home none   Activity/Exercise/Self-Care Comment owns walker, cane, shower bars   Disability/Function   Hearing Difficulty or Deaf no   Wear Glasses or Blind no   Concentrating, Remembering or Making Decisions Difficulty no   Difficulty Communicating no   Difficulty Eating/Swallowing no   Walking or Climbing Stairs Difficulty yes   Dressing/Bathing Difficulty no   Toileting issues no   Doing Errands Independently Difficulty (such as shopping) yes   Errands Management  assists   Fall history within last six months no   Change in Functional Status Since Onset of Current Illness/Injury yes   General Information   Onset of Illness/Injury or Date of Surgery 06/01/21   Referring Physician Felicia Euceda MD   Patient/Family Therapy Goals Statement (PT) return to walking, riding horses, taking care of horses, pool walking   Pertinent History of Current Problem (include personal factors and/or comorbidities that impact the POC) 70 year old y/o female with PMHx DL, HTN, GERD, CRI (baseline Cr 1), breast cancer s/p chemoradiation and muscle invasive bladder cancer s/p neoadjuvant chemotherpay POD#1 s/p radical cystectomy with ileal conduit and BPLND.   Existing  "Precautions/Restrictions abdominal;fall   Heart Disease Risk Factors Medical history;Age   Cognition   Orientation Status (Cognition) oriented x 4   Affect/Mental Status (Cognition) WNL   Follows Commands (Cognition) WNL   Cognitive Status Comments pt reports feeling \"foggy-brained\"   Pain Assessment   Patient Currently in Pain Yes, see Vital Sign flowsheet   Integumentary/Edema   Integumentary/Edema no deficits were identifed   Posture    Posture Protracted shoulders;Forward head position   Range of Motion (ROM)   ROM Quick Adds ROM WFL   ROM Comment assessed via functional mobility   Strength   Strength Comments Grossly >3+/5, assessed via functional mobility   Bed Mobility   Comment (Bed Mobility) SBA   Transfers   Transfer Safety Comments CGA w/ FWW, legs buckly   Gait/Stairs (Locomotion)   Distance in Feet (Required for LE Total Joints) 20, 60, 80   Comment (Gait/Stairs) Ambulation CGA w/ IV pole, w/c follow   Balance   Balance Comments good sitting balance, poor tandem stance/SLS (better on L), R knee mateo during dynamic   Sensory Examination   Sensory Perception patient reports no sensory changes   Coordination   Coordination no deficits were identified   Muscle Tone   Muscle Tone no deficits were identified   Clinical Impression   Criteria for Skilled Therapeutic Intervention yes, treatment indicated   PT Diagnosis (PT) decreased functional mobility   Influenced by the following impairments balance, strength, pain, post-surgical cognition   Functional limitations due to impairments bed mobility, transfers, ambulation, stairs, ADLs   Clinical Presentation Stable/Uncomplicated   Clinical Presentation Rationale chart review, clinical reasoning   Clinical Decision Making (Complexity) low complexity   Therapy Frequency (PT) 5x/week   Predicted Duration of Therapy Intervention (days/wks) 1 wk   Planned Therapy Interventions (PT) balance training;bed mobility training;gait training;home exercise " program;neuromuscular re-education;patient/family education;stair training;strengthening;transfer training   Risk & Benefits of therapy have been explained evaluation/treatment results reviewed;care plan/treatment goals reviewed;participants voiced agreement with care plan;participants included;patient   PT Discharge Planning    PT Discharge Recommendation (DC Rec) Transitional Care Facility   PT Rationale for DC Rec pt is mobilizing well, limited by pain, fatigue, and deconditioning. pt will likely improve to be safe to return home, but recommend TCU at this time d/t concerns with activity tolerance and strength.    PT Brief overview of current status  CGA w/ IV pole, w/c follow for ambulation OOR   Total Evaluation Time   Total Evaluation Time (Minutes) 8

## 2021-06-02 NOTE — CONSULTS
WOC Nurse Inpatient [facility]   WO Nurse Inpatient Adult     Initial Assessment   Assessment of new end Ileal Conduit Stoma complication(s) none   Mucocutaneous junction; intact   Peristomal complication(s) none   Pouch wear time:less than 24 hours  Following today's visit:Patient  is  able to demonstrate;       1. How to empty their pouch? no      2. How to change their pouch?  No, observed pouch change with step by step instruction      3. How to read and record intake and output correctly? no    Objective data:  Patient history according to medical record: 70 year old y/o female with PMHx DL, HTN, GERD, CRI (baseline Cr 1), breast cancer s/p chemoradiation and muscle invasive bladder cancer s/p neoadjuvant chemotherpay POD#1 s/p radical cystectomy with ileal conduit and BPLND.  Current Diet/Nutrition: Orders Placed This Encounter      Clear Liquid Diet     TPN no   I/O last 3 completed shifts:  In: 1345.42 [P.O.:480; I.V.:865.42]  Out: 2595 [Urine:2025; Drains:470; Blood:100]  Labs:    Recent Labs   Lab 06/02/21  1350 06/02/21  0800   HGB 7.5* 8.1*   WBC  --  15.9*        Physical Exam:  Current pouching system:Tara 70mm 2 piece  Reason for pouch change today: leakage  Stoma appearance: pink-red  Stoma size; 32mm ,  urethral stents and red tucker.   Peristomal skin: intact and creases or folds present   Stoma output :red, pink and serosanguinous   Abdominal  Assessment  soft , NG still in place? No  Surgical Site: staples intact and dressing changed due to saturation  Pain: Dull ache  Is patient still on a PCA No    Interventions:  Patient's chart evaluated.  Focus of today's visit: initial fitting, evaluate leakage issue, pouch change demonstration  and verbal instruction    Participant of teaching session today patient   Orders: Written  Change made with ostomy management today: Yes placed in 57 flat 2 piece with urine pouch and adapt barrier ring  Patient/family:  observing  Supplies:Gathered and at bedside    Plan:  Learning needs: refitting of appliance, pouch change return demonstration, verbal instruction , diet and hydration , pouch emptying, output measurement, odor/flatus management, lifestyle adjustments and discharge instructions  Preparation for discharge: No discharge preparations started  Recommend home care? yes and by home WOC nurse if possible    Discussed plan of care with Patient  Nursing to notify the Provider(s) and re-consult the WOC Nurse if new ostomy concerns or discharge planned before next planned WOC visit.    WOC Nurse will return: Daily M-F  Face to face time: 45 minutes    Sofi Candelaria RN, CWOCN

## 2021-06-03 ENCOUNTER — APPOINTMENT (OUTPATIENT)
Dept: PHYSICAL THERAPY | Facility: CLINIC | Age: 71
DRG: 655 | End: 2021-06-03
Attending: UROLOGY
Payer: MEDICARE

## 2021-06-03 ENCOUNTER — APPOINTMENT (OUTPATIENT)
Dept: OCCUPATIONAL THERAPY | Facility: CLINIC | Age: 71
DRG: 655 | End: 2021-06-03
Attending: UROLOGY
Payer: MEDICARE

## 2021-06-03 LAB
ANION GAP SERPL CALCULATED.3IONS-SCNC: 4 MMOL/L (ref 3–14)
BLD PROD TYP BPU: NORMAL
BLD UNIT ID BPU: 0
BLOOD PRODUCT CODE: NORMAL
BPU ID: NORMAL
BUN SERPL-MCNC: 13 MG/DL (ref 7–30)
CALCIUM SERPL-MCNC: 8.3 MG/DL (ref 8.5–10.1)
CHLORIDE SERPL-SCNC: 114 MMOL/L (ref 94–109)
CO2 SERPL-SCNC: 24 MMOL/L (ref 20–32)
COPATH REPORT: NORMAL
CREAT SERPL-MCNC: 0.88 MG/DL (ref 0.52–1.04)
ERYTHROCYTE [DISTWIDTH] IN BLOOD BY AUTOMATED COUNT: 13.6 % (ref 10–15)
GFR SERPL CREATININE-BSD FRML MDRD: 65 ML/MIN/{1.73_M2}
GLUCOSE SERPL-MCNC: 120 MG/DL (ref 70–99)
HCT VFR BLD AUTO: 23.2 % (ref 35–47)
HGB BLD-MCNC: 6.4 G/DL (ref 11.7–15.7)
HGB BLD-MCNC: 7.2 G/DL (ref 11.7–15.7)
MCH RBC QN AUTO: 32.9 PG (ref 26.5–33)
MCHC RBC AUTO-ENTMCNC: 31 G/DL (ref 31.5–36.5)
MCV RBC AUTO: 106 FL (ref 78–100)
PLATELET # BLD AUTO: 290 10E9/L (ref 150–450)
POTASSIUM SERPL-SCNC: 3.6 MMOL/L (ref 3.4–5.3)
RBC # BLD AUTO: 2.19 10E12/L (ref 3.8–5.2)
SODIUM SERPL-SCNC: 142 MMOL/L (ref 133–144)
TRANSFUSION STATUS PATIENT QL: NORMAL
TRANSFUSION STATUS PATIENT QL: NORMAL
WBC # BLD AUTO: 14.9 10E9/L (ref 4–11)

## 2021-06-03 PROCEDURE — 97535 SELF CARE MNGMENT TRAINING: CPT | Mod: GO | Performed by: OCCUPATIONAL THERAPIST

## 2021-06-03 PROCEDURE — 85018 HEMOGLOBIN: CPT | Performed by: UROLOGY

## 2021-06-03 PROCEDURE — 97110 THERAPEUTIC EXERCISES: CPT | Mod: GP | Performed by: REHABILITATION PRACTITIONER

## 2021-06-03 PROCEDURE — 250N000011 HC RX IP 250 OP 636: Performed by: STUDENT IN AN ORGANIZED HEALTH CARE EDUCATION/TRAINING PROGRAM

## 2021-06-03 PROCEDURE — 85027 COMPLETE CBC AUTOMATED: CPT | Performed by: PHYSICIAN ASSISTANT

## 2021-06-03 PROCEDURE — 36592 COLLECT BLOOD FROM PICC: CPT | Performed by: UROLOGY

## 2021-06-03 PROCEDURE — 258N000003 HC RX IP 258 OP 636: Performed by: PHYSICIAN ASSISTANT

## 2021-06-03 PROCEDURE — 97116 GAIT TRAINING THERAPY: CPT | Mod: GP | Performed by: REHABILITATION PRACTITIONER

## 2021-06-03 PROCEDURE — P9016 RBC LEUKOCYTES REDUCED: HCPCS | Performed by: UROLOGY

## 2021-06-03 PROCEDURE — 36592 COLLECT BLOOD FROM PICC: CPT | Performed by: PHYSICIAN ASSISTANT

## 2021-06-03 PROCEDURE — 250N000013 HC RX MED GY IP 250 OP 250 PS 637: Performed by: STUDENT IN AN ORGANIZED HEALTH CARE EDUCATION/TRAINING PROGRAM

## 2021-06-03 PROCEDURE — 999N000199 HC STATISTIC WOC PT EDUCATION, 31-45 MIN

## 2021-06-03 PROCEDURE — 250N000013 HC RX MED GY IP 250 OP 250 PS 637: Performed by: PHYSICIAN ASSISTANT

## 2021-06-03 PROCEDURE — 80048 BASIC METABOLIC PNL TOTAL CA: CPT | Performed by: PHYSICIAN ASSISTANT

## 2021-06-03 PROCEDURE — 85018 HEMOGLOBIN: CPT | Performed by: PHYSICIAN ASSISTANT

## 2021-06-03 PROCEDURE — 85018 HEMOGLOBIN: CPT | Performed by: STUDENT IN AN ORGANIZED HEALTH CARE EDUCATION/TRAINING PROGRAM

## 2021-06-03 PROCEDURE — 120N000002 HC R&B MED SURG/OB UMMC

## 2021-06-03 RX ADMIN — DOCUSATE SODIUM 100 MG: 100 CAPSULE, LIQUID FILLED ORAL at 08:42

## 2021-06-03 RX ADMIN — DOCUSATE SODIUM 50 MG AND SENNOSIDES 8.6 MG 1 TABLET: 8.6; 5 TABLET, FILM COATED ORAL at 08:44

## 2021-06-03 RX ADMIN — DOCUSATE SODIUM 100 MG: 100 CAPSULE, LIQUID FILLED ORAL at 19:33

## 2021-06-03 RX ADMIN — ACETAMINOPHEN 975 MG: 325 TABLET, FILM COATED ORAL at 11:12

## 2021-06-03 RX ADMIN — ESCITALOPRAM OXALATE 30 MG: 20 TABLET ORAL at 08:43

## 2021-06-03 RX ADMIN — ACETAMINOPHEN 975 MG: 325 TABLET, FILM COATED ORAL at 02:56

## 2021-06-03 RX ADMIN — OXYCODONE HYDROCHLORIDE 10 MG: 5 TABLET ORAL at 13:14

## 2021-06-03 RX ADMIN — OMEPRAZOLE 20 MG: 20 CAPSULE, DELAYED RELEASE ORAL at 08:44

## 2021-06-03 RX ADMIN — OXYCODONE HYDROCHLORIDE 5 MG: 5 TABLET ORAL at 03:01

## 2021-06-03 RX ADMIN — HEPARIN SODIUM 5000 UNITS: 5000 INJECTION, SOLUTION INTRAVENOUS; SUBCUTANEOUS at 11:12

## 2021-06-03 RX ADMIN — METHOCARBAMOL 750 MG: 750 TABLET ORAL at 11:12

## 2021-06-03 RX ADMIN — OXYCODONE HYDROCHLORIDE 10 MG: 5 TABLET ORAL at 22:20

## 2021-06-03 RX ADMIN — METHOCARBAMOL 750 MG: 750 TABLET ORAL at 18:29

## 2021-06-03 RX ADMIN — ATORVASTATIN CALCIUM 20 MG: 20 TABLET, FILM COATED ORAL at 08:42

## 2021-06-03 RX ADMIN — OXYCODONE HYDROCHLORIDE 5 MG: 5 TABLET ORAL at 04:40

## 2021-06-03 RX ADMIN — SODIUM CHLORIDE: 9 INJECTION, SOLUTION INTRAVENOUS at 13:21

## 2021-06-03 RX ADMIN — OXYCODONE HYDROCHLORIDE 10 MG: 5 TABLET ORAL at 18:07

## 2021-06-03 RX ADMIN — DOCUSATE SODIUM 50 MG AND SENNOSIDES 8.6 MG 1 TABLET: 8.6; 5 TABLET, FILM COATED ORAL at 19:32

## 2021-06-03 RX ADMIN — ACETAMINOPHEN 975 MG: 325 TABLET, FILM COATED ORAL at 19:32

## 2021-06-03 RX ADMIN — PROCHLORPERAZINE MALEATE 5 MG: 5 TABLET ORAL at 06:12

## 2021-06-03 RX ADMIN — ONDANSETRON 4 MG: 4 TABLET, ORALLY DISINTEGRATING ORAL at 16:58

## 2021-06-03 RX ADMIN — ONDANSETRON 4 MG: 4 TABLET, ORALLY DISINTEGRATING ORAL at 22:27

## 2021-06-03 RX ADMIN — OXYCODONE HYDROCHLORIDE 10 MG: 5 TABLET ORAL at 08:56

## 2021-06-03 RX ADMIN — POLYETHYLENE GLYCOL 3350 17 G: 17 POWDER, FOR SOLUTION ORAL at 08:45

## 2021-06-03 ASSESSMENT — ACTIVITIES OF DAILY LIVING (ADL)
ADLS_ACUITY_SCORE: 15
DEPENDENT_IADLS:: INDEPENDENT
ADLS_ACUITY_SCORE: 15

## 2021-06-03 ASSESSMENT — MIFFLIN-ST. JEOR: SCORE: 1174.95

## 2021-06-03 NOTE — PLAN OF CARE
A&Ox4. AVSS. Hgb 7.2 this morning. Plan to recheck hgb at 4 pm.Abdominal incision pain is controlled with PRN oxy 10mg tab po, PRN robaxin 750 mg mg tab po four times a day and scheduled tylenol 975 mg tab po three times a day.Urostomy- 2 stents + red tucker, with adequate cherry/red colored urine. TITI with moderate serosang output.Abd incision with staples C/D/I. Pt is advanced to full liquid die( pudding , ice cream and coffee for breakfast)t; tolerated so far. Denied N/V. Pt reported passing gas a lot; but denied having BM. Up to chair with assist of one; sat on a chair for couple of hrs. Walked in [place] with physical therapy.Uses IS independently. Pneumo compression on bilateral legs. Continue with POC.

## 2021-06-03 NOTE — CONSULTS
Care Management Initial Consult    General Information  Assessment completed with: Patient,    Type of CM/SW Visit: Initial Assessment    Primary Care Provider verified and updated as needed: Yes   Readmission within the last 30 days: no previous admission in last 30 days      Reason for Consult: discharge planning  Advance Care Planning: Advance Care Planning Reviewed: no concerns identified          Communication Assessment  Patient's communication style: spoken language (English or Bilingual)    Hearing Difficulty or Deaf: no   Wear Glasses or Blind: no    Cognitive  Cognitive/Neuro/Behavioral: WDL                      Living Environment:   People in home: spouse     Current living Arrangements: house      Able to return to prior arrangements: yes       Family/Social Support:  Care provided by: self  Provides care for:    Marital Status:   , Children          Description of Support System: Supportive, Involved    Support Assessment: Adequate family and caregiver support, Adequate social supports    Current Resources:   Patient receiving home care services: No     Community Resources: None  Equipment currently used at home: none  Supplies currently used at home: None    Employment/Financial:  Employment Status: retired        Financial Concerns: No concerns identified           Lifestyle & Psychosocial Needs:        Socioeconomic History     Marital status:      Spouse name: ozzie     Number of children: 5     Years of education: Not on file     Highest education level: Not on file   Occupational History     Employer: [employer]     Tobacco Use     Smoking status: Former Smoker     Packs/day: 3.00     Years: 10.00     Pack years: 30.00     Types: Cigarettes     Quit date: 1979     Years since quittin.5     Smokeless tobacco: Never Used     Tobacco comment: approx. 3 packs daily   Substance and Sexual Activity     Alcohol use: Yes     Alcohol/week: 0.0 standard drinks     Comment:  daily glass of wine     Drug use: No     Sexual activity: Yes     Partners: Male       Functional Status:  Prior to admission patient needed assistance:   Dependent ADLs:: Independent  Dependent IADLs:: Independent  Assesssment of Functional Status: Not at baseline with mobility    Mental Health Status:  Mental Health Status: Current Concern  Mental Health Management: Medication    Chemical Dependency Status:  Chemical Dependency Status: No Current Concerns             Values/Beliefs:  Spiritual, Cultural Beliefs, Buddhist Practices, Values that affect care: no  Description of Beliefs that Will Affect Care: would like to see a hospital bebeto            Additional Information:  Patient is a 70 year old female with past medical history of HTN, GERD, CRI (baseline Cr 1), breast cancer s/p chemoradiation and muscle invasive bladder cancer s/p neoadjuvant chemotherapy now s/p radical cystectomy with ileal conduit and BPLND.  PT/OT currently recommending TCU but anticipate patient with improve and be safe to discharge to home.  WOC RN following to provide education on urostomy cares and recommending home RN at discharge.  Met with patient at bedside to introduce RNCC role and discuss discharge planning.  Patient lives in [place], [place] with her spouse.  Her daughter and son live nearby and are also supportive of her.  She will be learning her ostomy cares and reports her spouse and daughter will be helping her post discharge.  Discussed current TCU recommendations and patient is hopeful she will be able to return home at the time of discharge.  Discussed home care f/u and patient is agreeable.  After providing choice of agency patient prefers a referral be made to [facility] for RN/WOC RN/PT/OT services.  Referral made and orders placed.  Patients spouse will provide transportation to home at time of discharge.  RNCC will continue to follow and assist with discharge planning.    [facility]  Home Care(RN/WOC RN/PT/OT)  Phone: [phone]  Fax: [phone]        Hetal Roe RNCC  Phone: [phone]  Pager: [phone]  To contact the weekend RNCC, Page: [phone]

## 2021-06-03 NOTE — PROGRESS NOTES
"Urology  Progress Note    - AFeb, VSS  - PM Hgb recheck 7.5, PM SQH held pending AM Hgb   - Pain well controlled  - Passing gas, still no bowel movements  - Tolerating clear liquids, no nausea or vomiting but some burping    Exam  /55 (BP Location: Right arm)   Pulse 86   Temp 99.6  F (37.6  C) (Axillary)   Resp 16   Ht 1.676 m (5' 6\")   Wt 66.3 kg (146 lb 1.6 oz)   SpO2 92%   BMI 23.58 kg/m    No acute distress  Unlabored breathing on room air  Abdomen soft, appropriately tender, nondistended. Stoma pink and healthy with red rubber catheter and stents in place, watermelon colored urine in tubing  Incision closed with staples, island dressing removed this AM, no erythema or lorrie-incisional drainage  TITI serosanguinous    UOP 3575/425  TITI 170/90    Labs   WBC (15.9)  Hgb (8.1 - > recheck 7.5)  Cr (1.12)    AM labs pending    Assessment/Plan  70 year old female with PMHx DL, HTN, GERD, CRI (baseline Cr 1), breast cancer s/p chemoradiation and muscle invasive bladder cancer s/p neoadjuvant chemotherapy POD#2 s/p radical cystectomy with ileal conduit and BPLND.    Neuro: continue scheduled and PRN tylenol, PRN oxycodone and dilaudid for pain control. Continue PTA lexapro, PRN xanax, PRN meclizine  CV: no acute concerns - PRN metoprolol for HTN  Pulm: incentive spirometry while awake  FEN/GI: Full liquid diet this AM, MIVF @ 100/hr. Senna/doc and miralax with opiates; no entereg given chronic pain. PRN compaxzine and zofran  Endo: no acute concerns  : continue red rubber catheter until ROBF; continue bilateral ureteral stents. WOC consult for stoma teaching  Heme/ID: SQH 5000 TID pending AM Hg  Activity: as tolerated  PPx: SCDs    Seen and examined with the chief resident. Will discuss with Dr. Robles.    Inderjit Kelley MD  Urology Resident, PGY-2      "

## 2021-06-03 NOTE — PROVIDER NOTIFICATION
"Provider notified \"7B 38-2 JEREMIE Dorman Pt needs blood consent form prior to transfusion. [place]ison RN [id number]\"    Terra Hickman RN on 6/3/2021 at 5:54 PM    "

## 2021-06-03 NOTE — PROVIDER NOTIFICATION
"Provider notified \"7B 38-2 JEREMIE Dorman [facility] blood transfusion consent is not valid for hospital- can we do a telephone consent if no one is in house? Thx Terra GALVAN [id number]\"    Terra Hickman RN on 6/3/2021 at 6:50 PM    "

## 2021-06-03 NOTE — PROGRESS NOTES
WOC Nurse Inpatient [facility]   WO Nurse Inpatient Adult     Initial Assessment   Assessment of new end Ileal Conduit Stoma complication(s) none   Mucocutaneous junction; intact   Peristomal complication(s) none   Pouch wear time:less than 24 hours  Following today's visit:Patient  is  able to demonstrate;       1. How to empty their pouch? yes      2. How to change their pouch?  No, observed pouch change with step by step instruction      3. How to read and record intake and output correctly? No, discussed process.    Objective data:  Patient history according to medical record: 70 year old y/o female with PMHx DL, HTN, GERD, CRI (baseline Cr 1), breast cancer s/p chemoradiation and muscle invasive bladder cancer s/p neoadjuvant chemotherpay POD#1 s/p radical cystectomy with ileal conduit and BPLND.  Current Diet/Nutrition: Orders Placed This Encounter      Full Liquid Diet     TPN no   I/O last 3 completed shifts:  In: 2025.83 [P.O.:500; I.V.:1525.83]  Out: 3595 [Urine:3300; Drains:295]  Labs:    Recent Labs   Lab 06/03/21  0811   HGB 7.2*   WBC 14.9*        Physical Exam:  Current pouching system:Reader 57 flat 2 piece with adapt barrier ring  Reason for pouch change today: pouch not changed today- changed overnight due to leakage.   Stoma appearance: pink-red  Stoma size; 32mm ,  urethral stents and red tucker.   Peristomal skin: intact and creases or folds present   Stoma output :red, pink and serosanguinous   Abdominal  Assessment  soft , NG still in place? No  Surgical Site: open to air and staples intact  Pain: Dull ache  Is patient still on a PCA No    Interventions:  Patient's chart evaluated.  Focus of today's visit: verbal instruction , diet and hydration , pouch emptying, output measurement, odor/flatus management and lifestyle adjustments   Participant of teaching session today patient   Orders: Reviewed  Change made with ostomy management today: No  Patient/family: observing and  active participation  Supplies:Gathered and at bedside    Plan:  Learning needs: refitting of appliance, pouch change return demonstration, verbal instruction  and discharge instructions  Preparation for discharge: Registered for samples from , Prescriptions or note left on chart for MD to sign/complete, Discussed making a WOC Nurse outpatient appointment upon discharge and Discussed when to follow up with a WOC Nurse in the future  Recommend home care? yes and by home WOC nurse if possible    Discussed plan of care with Patient  Nursing to notify the Provider(s) and re-consult the WOC Nurse if new ostomy concerns or discharge planned before next planned WOC visit.    WOC Nurse will return: Daily M-F  Face to face time: 45 minutes    Sofi Canedlaria RN, CWOCN        [facility]     Name: Michaela Dorman  Date: 6/3/2021    To order your ostomy supplies    The ostomy Supplier needs this supply list  to process your order. You will need to fax/deliver this list, along with your Insurance information. Your home care nurse can assist with this process.                 List of Ostomy Distributors      [facility]  Ph. [phone] Fax # [phone]  [facility] [employer]   Ph. [phone] [phone]  [employer] [facility]   Ph. [phone]  [facility]   Ph. [phone] [phone]  Or Call your insurance provider for their preferred supplier    Your Medical Supplier will need your surgeon's name, phone and fax number    Clinic:                     Phone                            Fax  Urology Surgery:              [phone]    Verbal Order for ostomy supplies for 1 Month per:                                          Bari Farah RN, CWOCN       Authorizing MD: Dr. Robles    Quantity of pouches: 20/m    Request the following supplies:      Tara     2 piece flat wafer 57mm  # [id number]                         Urine pouch 57mm- #  "[id number]    Accessories  2  barrier ring #7805 -20/m    Adapt powder #[id number]    No sting film barrier # [id number]                         Bard urine drainage bag #[id number]                          Coloplast leg bag #[id number]    Bannish II liquid urine deoderizer #[id number]             Urikleen  # [id number]    Night drainage bottle # [id number]     or Melba 1/2 gallon Bottle with 6\" tubing                    Change your pouch three times a week, more often if leaking.    If you are cutting a hole in the wafer of your pouch, recheck stoma size and adjust pouch opening as needed every week    . Call the Ostomy Nurse at [facility]       [address] [place] [address], [place] : [phone]   Schedule a follow-up visit in 2 to 4 weeks after your surgery, sooner if having problems Bring a complete set of pouch-changing supplies to this visit        Problems you should Report  - The stoma turns blue or darker in color.  - Cuts or sores around the stoma.  - Red, raw or painful skin around the stoma.  - Any bulging of the skin around the stoma.  - A pouch that leaks every day.  - Problems making the right size hole in the pouch wafer.    Please call with any questions or concerns.        "

## 2021-06-03 NOTE — PLAN OF CARE
"/55 (BP Location: Right arm)   Pulse 86   Temp 99.6  F (37.6  C) (Axillary)   Resp 16   Ht 1.676 m (5' 6\")   Wt 66.3 kg (146 lb 1.6 oz)   SpO2 92%   BMI 23.58 kg/m      0862-3409  Reason for admission: POD#2- Radically cystectomy, ileal conduit creation, bilateral pelvic lymphadenectomy  Neuro: A&Ox4, calls appropriately  Cardiac: WDL, no chest pain  Respiratory: WDL, RA satting 92-94%, no SOB  GI/: voiding via urostomy, +flatus, no BM this shift  Pain: given dilaudid x1, oxycodone x2, for incisional pain & chronic back pain, helped repo to side-lying seemed to help, compazine x1 for intermittent nausea  Lines: R port- NS @ 100/hr  Drains: LLQ TITI- AOP, changed drsg x1, RLQ Urostomy- 2 stents + red tucker, red output, changed pouch x1- leaking on medial side towards stapled incision  Incisions: midline incision- covered w/ primapore, changed x1  Activity: assist x1 to commode, BLE- weakness, L leg & hand- numbness present  Diet: tolerating clears during the shift- advanced to fulls this am  Labs: hgb- 7.5, glu- 105, Ca- 8.4, Creat- 1.12  Changes/Plan: removed R PIV, pt was saturated after sleeping- urostomy had leaked, changed pouch and all dressings with linen change, continue POC    "

## 2021-06-03 NOTE — PROGRESS NOTES
CLINICAL NUTRITION SERVICES - BRIEF NOTE   (See RD note on 6/2 for full assessment)     Reason for RD note:   Adjusting oral supplements with diet advance.    New Findings/Chart Review:  Oral Intake: Diet advanced to full liquid diet     Interventions:  Change Ensure Clear to Ensure Max protein-chocolate @ 10AM and dinner.     Future/Additional Recommendations:  - monitor oral intake of meals and supplements.      Nutrition will continue to follow per protocol.    Roselia Rios RD, LD   7B (M-F) Pager: [phone]  RD Weekend Pager: [phone]

## 2021-06-03 NOTE — PROVIDER NOTIFICATION
"Provider notified \"7B 38-2 JEREMIE Dorman recheck Hgb 6.4 down from 7.2 at 0800. Thx Terra RN [id number]\"    Terra Hickman RN on 6/3/2021 at 5:21 PM    "

## 2021-06-03 NOTE — PLAN OF CARE
Michelle Quiroga is a 62 year old female presenting for a complete eye exam.     CK CATARACT NEVUS OD, FHX GL     Occupation: Retail        Patient continues to have above problems and would like them evaluated today.    Assistant's Notes reviewed and confirmed.  Reviewed past personal, ocular, family, and social history.  Risks and benefits of dilation discussed with the patient.  Patient is alert and oriented to time and place. Appropriate mood and affect.  Retinoscopy performed, assume same as manifest refraction unless otherwise noted.  .  Cover Test: Distance: Ortho   Near: Ortho    Pupils: equal, round, and reactive to light and accommodation, no afferent pupillary defect.        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   Pain:  Pt reports incisional and chronic back pain. PRN IV dilaudid and oxy  Neuro: A&O x4, able to make needs known  Cardiac: WDL  Respiratory:   WDL, LS clear. Denies SOB  GI/:  +BS, +flatus small amounts. No BM yet. Adequate UOP in urostomy, red/pink tinged in color.   Diet: Clears, good appeite.   Lines: Port infusing NS 100ml/hr   Incisions/Drains/Skin:  Midline incision covered with dressing sutured underneath no drainage. L TITI drain to bulb suction serosang output. R urostomy intact.   Lab:  hgb 7.5  Electrolyte Replacements: NA    New changes this shift:  Pt tolerating clear liquid diet. Pt reports running higher temps when sleeping and with previous back surgery.

## 2021-06-04 ENCOUNTER — APPOINTMENT (OUTPATIENT)
Dept: PHYSICAL THERAPY | Facility: CLINIC | Age: 71
DRG: 655 | End: 2021-06-04
Attending: UROLOGY
Payer: MEDICARE

## 2021-06-04 ENCOUNTER — APPOINTMENT (OUTPATIENT)
Dept: OCCUPATIONAL THERAPY | Facility: CLINIC | Age: 71
DRG: 655 | End: 2021-06-04
Attending: UROLOGY
Payer: MEDICARE

## 2021-06-04 LAB
ANION GAP SERPL CALCULATED.3IONS-SCNC: 2 MMOL/L (ref 3–14)
BUN SERPL-MCNC: 8 MG/DL (ref 7–30)
CALCIUM SERPL-MCNC: 9.1 MG/DL (ref 8.5–10.1)
CHLORIDE SERPL-SCNC: 112 MMOL/L (ref 94–109)
CO2 SERPL-SCNC: 26 MMOL/L (ref 20–32)
CREAT SERPL-MCNC: 0.76 MG/DL (ref 0.52–1.04)
ERYTHROCYTE [DISTWIDTH] IN BLOOD BY AUTOMATED COUNT: 16.1 % (ref 10–15)
GFR SERPL CREATININE-BSD FRML MDRD: 79 ML/MIN/{1.73_M2}
GLUCOSE SERPL-MCNC: 125 MG/DL (ref 70–99)
HCT VFR BLD AUTO: 28 % (ref 35–47)
HGB BLD-MCNC: 8.1 G/DL (ref 11.7–15.7)
HGB BLD-MCNC: 9 G/DL (ref 11.7–15.7)
MCH RBC QN AUTO: 32.1 PG (ref 26.5–33)
MCHC RBC AUTO-ENTMCNC: 32.1 G/DL (ref 31.5–36.5)
MCV RBC AUTO: 100 FL (ref 78–100)
PLATELET # BLD AUTO: 328 10E9/L (ref 150–450)
POTASSIUM SERPL-SCNC: 3.8 MMOL/L (ref 3.4–5.3)
RBC # BLD AUTO: 2.8 10E12/L (ref 3.8–5.2)
SODIUM SERPL-SCNC: 140 MMOL/L (ref 133–144)
WBC # BLD AUTO: 15.6 10E9/L (ref 4–11)

## 2021-06-04 PROCEDURE — 97535 SELF CARE MNGMENT TRAINING: CPT | Mod: GO

## 2021-06-04 PROCEDURE — 97530 THERAPEUTIC ACTIVITIES: CPT | Mod: GP | Performed by: PHYSICAL THERAPIST

## 2021-06-04 PROCEDURE — 85027 COMPLETE CBC AUTOMATED: CPT | Performed by: PHYSICIAN ASSISTANT

## 2021-06-04 PROCEDURE — 250N000011 HC RX IP 250 OP 636: Performed by: PHYSICIAN ASSISTANT

## 2021-06-04 PROCEDURE — 97530 THERAPEUTIC ACTIVITIES: CPT | Mod: GO

## 2021-06-04 PROCEDURE — 250N000013 HC RX MED GY IP 250 OP 250 PS 637: Performed by: PHYSICIAN ASSISTANT

## 2021-06-04 PROCEDURE — 120N000002 HC R&B MED SURG/OB UMMC

## 2021-06-04 PROCEDURE — 250N000013 HC RX MED GY IP 250 OP 250 PS 637: Performed by: STUDENT IN AN ORGANIZED HEALTH CARE EDUCATION/TRAINING PROGRAM

## 2021-06-04 PROCEDURE — 250N000011 HC RX IP 250 OP 636: Performed by: STUDENT IN AN ORGANIZED HEALTH CARE EDUCATION/TRAINING PROGRAM

## 2021-06-04 PROCEDURE — 36592 COLLECT BLOOD FROM PICC: CPT | Performed by: PHYSICIAN ASSISTANT

## 2021-06-04 PROCEDURE — 97116 GAIT TRAINING THERAPY: CPT | Mod: GP | Performed by: PHYSICAL THERAPIST

## 2021-06-04 PROCEDURE — 999N000196 HC STATISTIC WOC PT EDUCATION, > 60 MIN

## 2021-06-04 PROCEDURE — 80048 BASIC METABOLIC PNL TOTAL CA: CPT | Performed by: PHYSICIAN ASSISTANT

## 2021-06-04 RX ORDER — HEPARIN SODIUM,PORCINE 10 UNIT/ML
5-10 VIAL (ML) INTRAVENOUS EVERY 24 HOURS
Status: DISCONTINUED | OUTPATIENT
Start: 2021-06-04 | End: 2021-06-06 | Stop reason: HOSPADM

## 2021-06-04 RX ORDER — PROCHLORPERAZINE 25 MG
12.5 SUPPOSITORY, RECTAL RECTAL EVERY 12 HOURS PRN
Status: DISCONTINUED | OUTPATIENT
Start: 2021-06-04 | End: 2021-06-06 | Stop reason: HOSPADM

## 2021-06-04 RX ORDER — HEPARIN SODIUM,PORCINE 10 UNIT/ML
5-10 VIAL (ML) INTRAVENOUS
Status: DISCONTINUED | OUTPATIENT
Start: 2021-06-04 | End: 2021-06-06 | Stop reason: HOSPADM

## 2021-06-04 RX ORDER — PROCHLORPERAZINE MALEATE 5 MG
5 TABLET ORAL EVERY 6 HOURS PRN
Status: DISCONTINUED | OUTPATIENT
Start: 2021-06-04 | End: 2021-06-06 | Stop reason: HOSPADM

## 2021-06-04 RX ORDER — HEPARIN SODIUM (PORCINE) LOCK FLUSH IV SOLN 100 UNIT/ML 100 UNIT/ML
5 SOLUTION INTRAVENOUS
Status: DISCONTINUED | OUTPATIENT
Start: 2021-06-04 | End: 2021-06-06 | Stop reason: HOSPADM

## 2021-06-04 RX ADMIN — OXYCODONE HYDROCHLORIDE 10 MG: 5 TABLET ORAL at 12:23

## 2021-06-04 RX ADMIN — ACETAMINOPHEN 650 MG: 325 TABLET, FILM COATED ORAL at 23:14

## 2021-06-04 RX ADMIN — DOCUSATE SODIUM 100 MG: 100 CAPSULE, LIQUID FILLED ORAL at 08:05

## 2021-06-04 RX ADMIN — ACETAMINOPHEN 975 MG: 325 TABLET, FILM COATED ORAL at 03:22

## 2021-06-04 RX ADMIN — OXYCODONE HYDROCHLORIDE 10 MG: 5 TABLET ORAL at 16:21

## 2021-06-04 RX ADMIN — OXYCODONE HYDROCHLORIDE 10 MG: 5 TABLET ORAL at 20:16

## 2021-06-04 RX ADMIN — HYDROMORPHONE HYDROCHLORIDE 0.5 MG: 1 INJECTION, SOLUTION INTRAMUSCULAR; INTRAVENOUS; SUBCUTANEOUS at 03:23

## 2021-06-04 RX ADMIN — HEPARIN SODIUM 5000 UNITS: 5000 INJECTION, SOLUTION INTRAVENOUS; SUBCUTANEOUS at 14:06

## 2021-06-04 RX ADMIN — PROCHLORPERAZINE EDISYLATE 5 MG: 5 INJECTION INTRAMUSCULAR; INTRAVENOUS at 10:28

## 2021-06-04 RX ADMIN — ACETAMINOPHEN 975 MG: 325 TABLET, FILM COATED ORAL at 10:28

## 2021-06-04 RX ADMIN — Medication 5 ML: at 17:09

## 2021-06-04 RX ADMIN — DOCUSATE SODIUM 100 MG: 100 CAPSULE, LIQUID FILLED ORAL at 20:22

## 2021-06-04 RX ADMIN — ESCITALOPRAM OXALATE 30 MG: 20 TABLET ORAL at 08:05

## 2021-06-04 RX ADMIN — HEPARIN SODIUM 5000 UNITS: 5000 INJECTION, SOLUTION INTRAVENOUS; SUBCUTANEOUS at 22:16

## 2021-06-04 RX ADMIN — DOCUSATE SODIUM 50 MG AND SENNOSIDES 8.6 MG 1 TABLET: 8.6; 5 TABLET, FILM COATED ORAL at 20:16

## 2021-06-04 RX ADMIN — DOCUSATE SODIUM 50 MG AND SENNOSIDES 8.6 MG 1 TABLET: 8.6; 5 TABLET, FILM COATED ORAL at 08:05

## 2021-06-04 RX ADMIN — Medication 5 ML: at 20:23

## 2021-06-04 RX ADMIN — METHOCARBAMOL 750 MG: 750 TABLET ORAL at 01:08

## 2021-06-04 RX ADMIN — METHOCARBAMOL 750 MG: 750 TABLET ORAL at 20:16

## 2021-06-04 RX ADMIN — POLYETHYLENE GLYCOL 3350 17 G: 17 POWDER, FOR SOLUTION ORAL at 08:05

## 2021-06-04 RX ADMIN — OMEPRAZOLE 20 MG: 20 CAPSULE, DELAYED RELEASE ORAL at 08:05

## 2021-06-04 RX ADMIN — OXYCODONE HYDROCHLORIDE 10 MG: 5 TABLET ORAL at 02:27

## 2021-06-04 RX ADMIN — OXYCODONE HYDROCHLORIDE 10 MG: 5 TABLET ORAL at 06:38

## 2021-06-04 RX ADMIN — ONDANSETRON 4 MG: 2 INJECTION INTRAMUSCULAR; INTRAVENOUS at 06:38

## 2021-06-04 RX ADMIN — ATORVASTATIN CALCIUM 20 MG: 20 TABLET, FILM COATED ORAL at 08:06

## 2021-06-04 ASSESSMENT — MIFFLIN-ST. JEOR: SCORE: 1179.49

## 2021-06-04 ASSESSMENT — ACTIVITIES OF DAILY LIVING (ADL)
ADLS_ACUITY_SCORE: 15

## 2021-06-04 NOTE — PROGRESS NOTES
WOC Nurse Inpatient [facility]   WO Nurse Inpatient Adult     Initial Assessment   Assessment of new end Ileal Conduit Stoma complication(s) none   Mucocutaneous junction; intact   Peristomal complication(s) none   Pouch wear time:24-48 hours  Following today's visit:Patient  is  able to demonstrate;       1. How to empty their pouch? yes      2. How to change their pouch? Yes with verbal direction         Objective data:  Patient history according to medical record: 70 year old y/o female with PMHx DL, HTN, GERD, CRI (baseline Cr 1), breast cancer s/p chemoradiation and muscle invasive bladder cancer s/p neoadjuvant chemotherpay POD#1 s/p radical cystectomy with ileal conduit and BPLND.  Current Diet/Nutrition: Orders Placed This Encounter      Regular Diet Adult     TPN no   I/O last 3 completed shifts:  In: 770 [P.O.:460; I.V.:10]  Out: 4030 [Urine:3725; Drains:305]  Labs:    Recent Labs   Lab 06/04/21  0755   HGB 9.0*   WBC 15.6*        Physical Exam:  Current pouching system:[place] 57 flat 2 piece with adapt barrier ring  Reason for pouch change today: ostomy education and routine schedule  Stoma appearance: pink-red  Stoma size; 29mm ,  urethral stents and red tucker.   Peristomal skin: intact and creases or folds present   Stoma output :red, pink and serosanguinous   Abdominal  Assessment  soft , NG still in place? No  Surgical Site: open to air and staples intact  Pain: Dull ache  Is patient still on a PCA No    Interventions:  Patient's chart evaluated.  Focus of today's visit: refitting of appliance, pouch change return demonstration, verbal instruction  and discharge instructions   Participant of teaching session today patient   Orders: Reviewed and Updated  Change made with ostomy management today: Yes- used 1/2 adapt ring to fill crease at 3 o'clock and base of stoma.   Patient/family: observing and active participation  Supplies:Gathered and at bedside    Plan:  Learning needs: all  topics covered, would benefit from additional pouch change return demonstration and reinforcement of additional topics- discharge / lifestyle adjustments.   Preparation for discharge: Registered for samples from , Prescriptions or note left on chart for MD to sign/complete, Discussed making a WOC Nurse outpatient appointment upon discharge and Discussed when to follow up with a WOC Nurse in the future  Recommend home care? yes and by home WOC nurse if possible    Discussed plan of care with Patient  Nursing to notify the Provider(s) and re-consult the WOC Nurse if new ostomy concerns or discharge planned before next planned WOC visit.    WOC Nurse will return: Monday  Face to face time: > 60 minutes    Sofi Candelaria RN, CWOCN        [facility]     Name: Michaela Dorman  Date: 6/3/2021    To order your ostomy supplies    The ostomy Supplier needs this supply list  to process your order. You will need to fax/deliver this list, along with your Insurance information. Your home care nurse can assist with this process.                 List of Ostomy Distributors      [facility]  Ph. [phone] [phone] Fax # [phone]  [facility]   Ph. [phone]  [facility]   Ph. [phone]  [facility]   Ph. [phone] [phone]  Or Call your insurance provider for their preferred supplier    Your Medical Supplier will need your surgeon's name, phone and fax number    Clinic:                     Phone                            Fax  Urology Surgery:              [phone]    Verbal Order for ostomy supplies for 1 Month per:                                          Bari Farah RN, CWOCN       Authorizing MD: Dr. Robles    Quantity of pouches: 20/m    Request the following supplies:      [place]     2 piece flat wafer 57mm  # [id number]                         Urine pouch 57mm- # [id number]    Accessories  2  barrier  "ring #7805     Adapt powder #7906    No sting film barrier # 3345                         Bard urine drainage bag #[id number]                          Coloplast leg bag #[id number]    Bannish II liquid urine deoderizer #[id number]             Urikleen  # [id number]    Night drainage bottle # [id number]     or Melba 1/2 gallon Bottle with 6\" tubing                    Change your pouch three times a week, more often if leaking.    If you are cutting a hole in the wafer of your pouch, recheck stoma size and adjust pouch opening as needed every week    . Call the Ostomy Nurse at [facility]       [address] [place] [address] [place] : [phone]     . [facility] outpatient [facility] department at                                                   [address] [place] [address] : [phone]    Schedule a follow-up visit in 2 to 4 weeks after your surgery, sooner if having problems Bring a complete set of pouch-changing supplies to this visit        Problems you should Report  - The stoma turns blue or darker in color.  - Cuts or sores around the stoma.  - Red, raw or painful skin around the stoma.  - Any bulging of the skin around the stoma.  - A pouch that leaks every day.  - Problems making the right size hole in the pouch wafer.    Please call with any questions or concerns.        "

## 2021-06-04 NOTE — PROGRESS NOTES
"Urology  Progress Note    - AFeb, VSS  - Received 1 unit for Hgb recheck in PM of 6.4, recheck at 8.1  - Pain control issues overnight  - Burning around incision site with ambulation  - Tolerating full liquid diet, still with low appetite; no nausea or vomiting  - Passing flatus, no bowel movements    Exam  BP (!) 158/68 (BP Location: Right arm)   Pulse 79   Temp 97.6  F (36.4  C) (Oral)   Resp 18   Ht 1.676 m (5' 6\")   Wt 64.3 kg (141 lb 12.8 oz)   SpO2 94%   BMI 22.89 kg/m    No acute distress  Unlabored breathing on 1L NC  Abdomen soft, appropriately tender, nondistended. Stoma pink and healthy with red rubber catheter and stents in place, radha urine in tubing  Incision closed with staples, no erythema or lorrie-incisional drainage  TITI serosanguinous    UOP 3250/500  TITI 335/30    Labs   WBC (14.9)  Hgb (6.4 -> 8.1 s/p 1 unit)  Cr (0.88)    AM labs pending    Assessment/Plan  70 year old female with PMHx DL, HTN, GERD, CRI (baseline Cr 1), breast cancer s/p chemoradiation and muscle invasive bladder cancer s/p neoadjuvant chemotherapy POD#3 s/p radical cystectomy with ileal conduit and BPLND.    Neuro: continue scheduled and PRN tylenol, PRN oxycodone and dilaudid for pain control. Continue PTA lexapro, PRN xanax, PRN meclizine  CV: no acute concerns - PRN metoprolol for HTN  Pulm: incentive spirometry while awake  FEN/GI: FLD, MIVF @ 50/hr. Senna/doc and miralax with opiates; no entereg given chronic pain. PRN compaxzine and zofran  Endo: no acute concerns  : continue red rubber catheter until ROBF; continue bilateral ureteral stents. [facility] consult for stoma teaching  Heme/ID: Received 1 unit yesterday for Hgb 6.4; SQH 5000 TID pending AM Hg  Activity: as tolerated  PPx: SCDs    Seen and examined with the chief resident. Will discuss with Dr. Robles.    Inderjit Kelley MD  Urology Resident, PGY-2      "

## 2021-06-04 NOTE — DISCHARGE INSTRUCTIONS
"  [facility]     Name: Michaela Dorman  Date: 6/3/2021    To order your ostomy supplies    The ostomy Supplier needs this supply list  to process your order. You will need to fax/deliver this list, along with your Insurance information. Your home care nurse can assist with this process.                 List of Ostomy Distributors      [facility]  Ph. [phone] [phone] Fax # [phone]  [facility]   Ph. [phone]  [facility]   Ph. [phone]  [facility]   Ph. [phone] [phone]  Or Call your insurance provider for their preferred supplier    Your Medical Supplier will need your surgeon's name, phone and fax number    Clinic:                     Phone                            Fax  Urology Surgery:              [phone]    Verbal Order for ostomy supplies for 1 Month per:                                          Bari Farah RN, CWOCN       Authorizing MD: Dr. Robles    Quantity of pouches: 20/m    Request the following supplies:      [place]     2 piece flat wafer 57mm  # [id number]                         Urine pouch 57mm- # 10316    Accessories  2  barrier ring #7805     Adapt powder #7906    No sting film barrier # 3345                         Bard urine drainage bag #[id number]                          Coloplast leg bag #[id number]    Bannish II liquid urine deoderizer #[id number]             Urikleen  # [id number]    Night drainage bottle # [id number]     or Melba 1/2 gallon Bottle with 6\" tubing                    Change your pouch three times a week, more often if leaking.    If you are cutting a hole in the wafer of your pouch, recheck stoma size and adjust pouch opening as needed every week    . Call the Ostomy Nurse at [facility]       [address] [place] [address] : [phone]     . [facility] outpatient [facility] department at                                        "            [address] [place] [address] : [phone]    Schedule a follow-up visit in 2 to 4 weeks after your surgery, sooner if having problems Bring a complete set of pouch-changing supplies to this visit        Problems you should Report  - The stoma turns blue or darker in color.  - Cuts or sores around the stoma.  - Red, raw or painful skin around the stoma.  - Any bulging of the skin around the stoma.  - A pouch that leaks every day.  - Problems making the right size hole in the pouch wafer.    Please call with any questions or concerns.

## 2021-06-04 NOTE — PLAN OF CARE
Vitals: Temp: 99.8  F (37.7  C) Temp src: Oral BP: 138/63 Pulse: 83   Resp: 16 SpO2: 94 % O2 Device: Nasal cannula Oxygen Delivery: 1 LPM      TMAX: 100.1    Time: 8768-8251  Reason for admission: Hx of bladder cancer POD #2 s/p radical cystectomy with ileal conduit creation and bilateral pelvic lymphadenectomy  Activity:  Assist x 1 gait belt. Ambulated in [place] with wheelchair follow in case knees buckle.   Pain:  Pt reports incisional and chronic back pain. PRN oxycodone with relief.   Neuro: A&O x4, able to make needs known  Cardiac: WDL  Respiratory:   WDL, LS clear. Denies SOB  GI/:  +BS, +flatus large amounts. No BM yet. Adequate UOP in urostomy, red/pink tinged in color.   Diet: Full liq, good appeite. Zofran x1 pt reports continuous nausea possibly from the oxycodone   Lines: Port infusing NS 50ml/hr   Incisions/Drains/Skin:  Midline incision staples and JOVANA no drainage. L TITI drain to bulb suction serosang output. R urostomy intact not leaking.   Lab:  hgb 7.2 - recheck 6.4  Electrolyte Replacements: NA     New changes this shift: 1 unit of blood transfused. Recheck hgb at midnight 6/4.

## 2021-06-04 NOTE — PLAN OF CARE
Vitals:    06/04/21 0233 06/04/21 0722 06/04/21 0810 06/04/21 0900   BP: (!) 158/68 (!) 153/66 121/62    BP Location: Right arm Right arm     Pulse: 79 94 91    Resp: 18 18     Temp: 97.6  F (36.4  C) 99  F (37.2  C)     TempSrc: Oral Oral     SpO2: 94% 91%     Weight:    64.8 kg (142 lb 12.8 oz)   Height:       A/D Pain well managed with oral pain meds per prn orders.  Pt has been up  with PT/OT (see notes)  Urostomy  moderate amount  dark to light color  urine  stents patent. TITI srosang  drainage. Abd incision gloria intact.  Tolerating diet well. Hgb today 9.0  I-  Pain meds per order, effective. Nausea reported once resolved after IV Compazine.  Pt is expressed feeling better.   P-  Continue to follow up per plan of care

## 2021-06-04 NOTE — PLAN OF CARE
Time: 6341-9597  Reason for admission: Hx of bladder cancer POD #3 s/p radical cystectomy with ileal conduit creation and bilateral pelvic lymphadenectomy  Activity:  Assist x 1 gait belt and sat up in chair during the night.  Pain:  Pt reports incisional and chronic back pain. Had oxycodone and robaxin without relief  of pain ,a 9/10 and gave dilaudid IV x1 with relief .  Neuro: A&O x4, able to make needs known  Cardiac: WDL  Respiratory:   WDL, LS clear. Denies SOB  GI/:  +BS, +flatus large amounts. No BM yet. Adequate UOP in urostomy, red/pink in color.  Diet: Full liq, good appeite. Denied nausea.  Lines: Port infusing NS 50ml/hr   Incisions/Drains/Skin:  Midline incision staples and JOVANA no drainage. L TITI drain to bulb suction  with 60 ml out R urostomy intact not leaking.  Lab:Hgb recheck 8.1 at midnight.   Continue POC and medicate as needed.

## 2021-06-04 NOTE — PROGRESS NOTES
BRIEF UROLOGY UPDATE:    Patient's Hb 6.4 at 1600 recheck, 1 UPRBC ordered.    There was confusion about blood consent and validity during this hospitalization. Per [facility] policy, surgical procedure consent does not cover blood consent outside of OR time. After further chart review, patient signed blood consent on 5/19 and received a PRBC transfusion pre-operatively on 5/21. Although clinic consent is good for 1 year, it was unclear if this was valid for her current in-patient hospitalization.     I personally discussed this with the nurse manager who discussed with risk management. I also discussed with blood bank staff. Ultimately a new telephone consent was obtained for blood transfusion during her current hospitalization.    Geneva Dominguez MD  PGY-3 Urology  Pager [phone]

## 2021-06-05 LAB
ANION GAP SERPL CALCULATED.3IONS-SCNC: 6 MMOL/L (ref 3–14)
BLD PROD TYP BPU: NORMAL
BLD UNIT ID BPU: 0
BLOOD PRODUCT CODE: NORMAL
BPU ID: NORMAL
BUN SERPL-MCNC: 10 MG/DL (ref 7–30)
CALCIUM SERPL-MCNC: 9.1 MG/DL (ref 8.5–10.1)
CHLORIDE SERPL-SCNC: 107 MMOL/L (ref 94–109)
CO2 SERPL-SCNC: 26 MMOL/L (ref 20–32)
CREAT FLD-MCNC: 0.8 MG/DL
CREAT SERPL-MCNC: 0.7 MG/DL (ref 0.52–1.04)
ERYTHROCYTE [DISTWIDTH] IN BLOOD BY AUTOMATED COUNT: 15.8 % (ref 10–15)
GFR SERPL CREATININE-BSD FRML MDRD: 86 ML/MIN/{1.73_M2}
GLUCOSE SERPL-MCNC: 116 MG/DL (ref 70–99)
HCT VFR BLD AUTO: 27.5 % (ref 35–47)
HGB BLD-MCNC: 8.6 G/DL (ref 11.7–15.7)
LACTATE BLD-SCNC: 0.9 MMOL/L (ref 0.7–2)
MCH RBC QN AUTO: 31.4 PG (ref 26.5–33)
MCHC RBC AUTO-ENTMCNC: 31.3 G/DL (ref 31.5–36.5)
MCV RBC AUTO: 100 FL (ref 78–100)
PLATELET # BLD AUTO: 346 10E9/L (ref 150–450)
POTASSIUM SERPL-SCNC: 3.6 MMOL/L (ref 3.4–5.3)
RBC # BLD AUTO: 2.74 10E12/L (ref 3.8–5.2)
SODIUM SERPL-SCNC: 139 MMOL/L (ref 133–144)
SPECIMEN SOURCE FLD: NORMAL
TRANSFUSION STATUS PATIENT QL: NORMAL
TRANSFUSION STATUS PATIENT QL: NORMAL
WBC # BLD AUTO: 13.6 10E9/L (ref 4–11)

## 2021-06-05 PROCEDURE — 85027 COMPLETE CBC AUTOMATED: CPT | Performed by: PHYSICIAN ASSISTANT

## 2021-06-05 PROCEDURE — 250N000011 HC RX IP 250 OP 636: Performed by: STUDENT IN AN ORGANIZED HEALTH CARE EDUCATION/TRAINING PROGRAM

## 2021-06-05 PROCEDURE — 83605 ASSAY OF LACTIC ACID: CPT | Performed by: UROLOGY

## 2021-06-05 PROCEDURE — 82570 ASSAY OF URINE CREATININE: CPT | Performed by: STUDENT IN AN ORGANIZED HEALTH CARE EDUCATION/TRAINING PROGRAM

## 2021-06-05 PROCEDURE — 250N000013 HC RX MED GY IP 250 OP 250 PS 637: Performed by: STUDENT IN AN ORGANIZED HEALTH CARE EDUCATION/TRAINING PROGRAM

## 2021-06-05 PROCEDURE — 250N000013 HC RX MED GY IP 250 OP 250 PS 637: Performed by: PHYSICIAN ASSISTANT

## 2021-06-05 PROCEDURE — 36592 COLLECT BLOOD FROM PICC: CPT | Performed by: PHYSICIAN ASSISTANT

## 2021-06-05 PROCEDURE — 80048 BASIC METABOLIC PNL TOTAL CA: CPT | Performed by: PHYSICIAN ASSISTANT

## 2021-06-05 PROCEDURE — 120N000002 HC R&B MED SURG/OB UMMC

## 2021-06-05 PROCEDURE — 36592 COLLECT BLOOD FROM PICC: CPT | Performed by: UROLOGY

## 2021-06-05 RX ADMIN — OXYCODONE HYDROCHLORIDE 10 MG: 5 TABLET ORAL at 23:07

## 2021-06-05 RX ADMIN — Medication 5 ML: at 08:51

## 2021-06-05 RX ADMIN — ACETAMINOPHEN 650 MG: 325 TABLET, FILM COATED ORAL at 10:22

## 2021-06-05 RX ADMIN — HEPARIN SODIUM 5000 UNITS: 5000 INJECTION, SOLUTION INTRAVENOUS; SUBCUTANEOUS at 06:03

## 2021-06-05 RX ADMIN — METHOCARBAMOL 750 MG: 750 TABLET ORAL at 06:04

## 2021-06-05 RX ADMIN — OXYCODONE HYDROCHLORIDE 5 MG: 5 TABLET ORAL at 00:35

## 2021-06-05 RX ADMIN — Medication 5 ML: at 17:13

## 2021-06-05 RX ADMIN — OXYCODONE HYDROCHLORIDE 10 MG: 5 TABLET ORAL at 19:32

## 2021-06-05 RX ADMIN — DOCUSATE SODIUM 100 MG: 100 CAPSULE, LIQUID FILLED ORAL at 07:49

## 2021-06-05 RX ADMIN — ESCITALOPRAM OXALATE 30 MG: 20 TABLET ORAL at 07:49

## 2021-06-05 RX ADMIN — OXYCODONE HYDROCHLORIDE 10 MG: 5 TABLET ORAL at 10:22

## 2021-06-05 RX ADMIN — OXYCODONE HYDROCHLORIDE 10 MG: 5 TABLET ORAL at 06:04

## 2021-06-05 RX ADMIN — DOCUSATE SODIUM 50 MG AND SENNOSIDES 8.6 MG 1 TABLET: 8.6; 5 TABLET, FILM COATED ORAL at 07:49

## 2021-06-05 RX ADMIN — POLYETHYLENE GLYCOL 3350 17 G: 17 POWDER, FOR SOLUTION ORAL at 07:49

## 2021-06-05 RX ADMIN — METHOCARBAMOL 750 MG: 750 TABLET ORAL at 14:37

## 2021-06-05 RX ADMIN — OXYCODONE HYDROCHLORIDE 10 MG: 5 TABLET ORAL at 14:37

## 2021-06-05 RX ADMIN — ONDANSETRON 4 MG: 4 TABLET, ORALLY DISINTEGRATING ORAL at 08:05

## 2021-06-05 RX ADMIN — ATORVASTATIN CALCIUM 20 MG: 20 TABLET, FILM COATED ORAL at 07:50

## 2021-06-05 RX ADMIN — OMEPRAZOLE 20 MG: 20 CAPSULE, DELAYED RELEASE ORAL at 07:49

## 2021-06-05 RX ADMIN — ENOXAPARIN SODIUM 40 MG: 40 INJECTION, SOLUTION INTRAVENOUS; SUBCUTANEOUS at 14:30

## 2021-06-05 RX ADMIN — Medication 5 ML: at 17:27

## 2021-06-05 ASSESSMENT — ACTIVITIES OF DAILY LIVING (ADL)
ADLS_ACUITY_SCORE: 15

## 2021-06-05 NOTE — PROGRESS NOTES
"Time: 19:00- 07:00  BP (!) 157/84 (BP Location: Right arm)   Pulse 84   Temp 98.8  F (37.1  C) (Oral)   Resp 16   Ht 1.676 m (5' 6\")   Wt 64.8 kg (142 lb 12.8 oz)   SpO2 95%   BMI 23.05 kg/m      Neuro: A/Ox4  Respiratory: WNL. IS done independently.   Cardiac: WNL.  GI/: Urostomy with good UO overnight. 2 stents in place. No BM over night, passing flatus. Abdomen soft and nontender.  Incisions/Drains: TITI drain dressing CDI. Midline incision stapled with no drainage.  IV Access: R. Port HL.  Pain: Pt. had pain managed with PRN oxycodone and robaxin.  N/V: Denied N/V  Labs: Reviewed.  Diet: Regular diet, tolerating full liquids.  Activity: SBA    Plan: Continue POC.      "

## 2021-06-05 NOTE — PROGRESS NOTES
"Urology  Progress Note    - T max 100.9 overnight, otherwise VSS  - Reports good pain control with oral pain meds  - Tolerating full diet, still with low appetite; ate pancake and eggs for dinner last night with no nausea or vomiting  - Passing flatus, no bowel movements    Exam  BP (!) 154/71 (BP Location: Right arm)   Pulse 85   Temp 99  F (37.2  C) (Oral)   Resp 16   Ht 1.676 m (5' 6\")   Wt 64.8 kg (142 lb 12.8 oz)   SpO2 93%   BMI 23.05 kg/m    No acute distress  Unlabored breathing on room air  Abdomen soft, appropriately tender, nondistended. Stoma pink and healthy with red rubber catheter and stents in place, clear yellow urine in tubing  Incision closed with staples, no erythema or lorrie-incisional drainage  TITI serosanguinous    UOP 1400/1850  TITI 245/75    Labs (6/4)  WBC 13.6 (15.6)  Hgb 8.6 (9.0)  Cr 0.7 (0.76)    Assessment/Plan  70 year old female with PMHx DL, HTN, GERD, CRI (baseline Cr 1), breast cancer s/p chemoradiation and muscle invasive bladder cancer s/p neoadjuvant chemotherapy POD#4 s/p radical cystectomy with ileal conduit and BPLND.    Neuro: continue scheduled and PRN tylenol, PRN oxycodone and dilaudid for pain control. Continue PTA lexapro, PRN xanax, PRN meclizine  CV: no acute concerns - PRN metoprolol for HTN  Pulm: incentive spirometry while awake  FEN/GI: regular diet, discontinue MIVF. Senna/doc and miralax with opiates; no entereg given chronic pain. PRN compaxzine and zofran  Endo: no acute concerns  : Remove red rubber catheter today; continue bilateral ureteral stents. WOC consult for stoma teaching  Heme/ID: Received 1 unit 6/3 for Hgb 6.4; Hgb stable   Activity: as tolerated  PPx: SCDs. Lovenox 40mg QD, needs teaching  Dispo: likely dc tomorrow    Plan discussed with Dr Margaret Connor MD  Urology PGY4    "

## 2021-06-05 NOTE — PLAN OF CARE
Vitals:    06/04/21 2300 06/05/21 0035 06/05/21 0601 06/05/21 0750   BP: (!) 163/79  (!) 157/84 (!) 154/71   BP Location: Right arm  Right arm Right arm   Pulse: 100  84 85   Resp: 18  16 16   Temp: 100.9  F (38.3  C) 99.9  F (37.7  C) 98.8  F (37.1  C) 99  F (37.2  C)   TempSrc: Oral Oral Oral Oral   SpO2: 90%  95% 93%   Weight:       Height:       Pt  has been asking for pain meds every 4hrs, stated that she has more pain today than the day before.  Nausea once resolved with Zofran ODT.  Up in room and hallway with SBA.  Did well.    Urostomy moderate amount  tea color  and TITI  serous  fluid sample sent to the lap result in EPIC.     Had  loose stool couple times small amount.   Continue to follow up per plan of care.

## 2021-06-06 ENCOUNTER — APPOINTMENT (OUTPATIENT)
Dept: OCCUPATIONAL THERAPY | Facility: CLINIC | Age: 71
DRG: 655 | End: 2021-06-06
Attending: UROLOGY
Payer: MEDICARE

## 2021-06-06 VITALS
SYSTOLIC BLOOD PRESSURE: 129 MMHG | OXYGEN SATURATION: 92 % | DIASTOLIC BLOOD PRESSURE: 67 MMHG | TEMPERATURE: 97.8 F | WEIGHT: 137.35 LBS | RESPIRATION RATE: 16 BRPM | HEIGHT: 66 IN | HEART RATE: 77 BPM | BODY MASS INDEX: 22.07 KG/M2

## 2021-06-06 LAB
ANION GAP SERPL CALCULATED.3IONS-SCNC: 5 MMOL/L (ref 3–14)
BUN SERPL-MCNC: 15 MG/DL (ref 7–30)
CALCIUM SERPL-MCNC: 9.7 MG/DL (ref 8.5–10.1)
CHLORIDE SERPL-SCNC: 105 MMOL/L (ref 94–109)
CO2 SERPL-SCNC: 30 MMOL/L (ref 20–32)
CREAT FLD-MCNC: 0.8 MG/DL
CREAT SERPL-MCNC: 0.79 MG/DL (ref 0.52–1.04)
ERYTHROCYTE [DISTWIDTH] IN BLOOD BY AUTOMATED COUNT: 14.8 % (ref 10–15)
GFR SERPL CREATININE-BSD FRML MDRD: 75 ML/MIN/{1.73_M2}
GLUCOSE SERPL-MCNC: 90 MG/DL (ref 70–99)
HCT VFR BLD AUTO: 27.5 % (ref 35–47)
HGB BLD-MCNC: 8.8 G/DL (ref 11.7–15.7)
MCH RBC QN AUTO: 31.7 PG (ref 26.5–33)
MCHC RBC AUTO-ENTMCNC: 32 G/DL (ref 31.5–36.5)
MCV RBC AUTO: 99 FL (ref 78–100)
PLATELET # BLD AUTO: 395 10E9/L (ref 150–450)
POTASSIUM SERPL-SCNC: 3.9 MMOL/L (ref 3.4–5.3)
RBC # BLD AUTO: 2.78 10E12/L (ref 3.8–5.2)
SODIUM SERPL-SCNC: 140 MMOL/L (ref 133–144)
SPECIMEN SOURCE FLD: NORMAL
WBC # BLD AUTO: 11.1 10E9/L (ref 4–11)

## 2021-06-06 PROCEDURE — 97530 THERAPEUTIC ACTIVITIES: CPT | Mod: GO

## 2021-06-06 PROCEDURE — 85027 COMPLETE CBC AUTOMATED: CPT | Performed by: PHYSICIAN ASSISTANT

## 2021-06-06 PROCEDURE — 36592 COLLECT BLOOD FROM PICC: CPT | Performed by: PHYSICIAN ASSISTANT

## 2021-06-06 PROCEDURE — 80048 BASIC METABOLIC PNL TOTAL CA: CPT | Performed by: PHYSICIAN ASSISTANT

## 2021-06-06 PROCEDURE — 250N000011 HC RX IP 250 OP 636: Performed by: STUDENT IN AN ORGANIZED HEALTH CARE EDUCATION/TRAINING PROGRAM

## 2021-06-06 PROCEDURE — 97535 SELF CARE MNGMENT TRAINING: CPT | Mod: GO

## 2021-06-06 PROCEDURE — 250N000013 HC RX MED GY IP 250 OP 250 PS 637: Performed by: PHYSICIAN ASSISTANT

## 2021-06-06 PROCEDURE — 250N000013 HC RX MED GY IP 250 OP 250 PS 637: Performed by: STUDENT IN AN ORGANIZED HEALTH CARE EDUCATION/TRAINING PROGRAM

## 2021-06-06 PROCEDURE — 82570 ASSAY OF URINE CREATININE: CPT | Performed by: STUDENT IN AN ORGANIZED HEALTH CARE EDUCATION/TRAINING PROGRAM

## 2021-06-06 RX ORDER — OXYCODONE HYDROCHLORIDE 5 MG/1
5 TABLET ORAL EVERY 6 HOURS PRN
Qty: 30 TABLET | Refills: 0 | Status: SHIPPED | OUTPATIENT
Start: 2021-06-06 | End: 2021-06-09

## 2021-06-06 RX ORDER — METHOCARBAMOL 750 MG/1
750 TABLET, FILM COATED ORAL 4 TIMES DAILY PRN
Qty: 30 TABLET | Refills: 0 | Status: SHIPPED | OUTPATIENT
Start: 2021-06-06 | End: 2022-11-16

## 2021-06-06 RX ORDER — SENNA AND DOCUSATE SODIUM 50; 8.6 MG/1; MG/1
1 TABLET, FILM COATED ORAL AT BEDTIME
Qty: 30 TABLET | Refills: 0 | Status: SHIPPED | OUTPATIENT
Start: 2021-06-06 | End: 2021-09-09

## 2021-06-06 RX ADMIN — DOCUSATE SODIUM 100 MG: 100 CAPSULE, LIQUID FILLED ORAL at 08:33

## 2021-06-06 RX ADMIN — ATORVASTATIN CALCIUM 20 MG: 20 TABLET, FILM COATED ORAL at 08:33

## 2021-06-06 RX ADMIN — OXYCODONE HYDROCHLORIDE 10 MG: 5 TABLET ORAL at 13:10

## 2021-06-06 RX ADMIN — OMEPRAZOLE 20 MG: 20 CAPSULE, DELAYED RELEASE ORAL at 08:33

## 2021-06-06 RX ADMIN — METHOCARBAMOL 750 MG: 750 TABLET ORAL at 08:33

## 2021-06-06 RX ADMIN — Medication 5 ML: at 14:51

## 2021-06-06 RX ADMIN — ACETAMINOPHEN 650 MG: 325 TABLET, FILM COATED ORAL at 13:10

## 2021-06-06 RX ADMIN — Medication 5 ML: at 07:53

## 2021-06-06 RX ADMIN — OXYCODONE HYDROCHLORIDE 10 MG: 5 TABLET ORAL at 08:33

## 2021-06-06 RX ADMIN — ENOXAPARIN SODIUM 40 MG: 40 INJECTION, SOLUTION INTRAVENOUS; SUBCUTANEOUS at 14:51

## 2021-06-06 RX ADMIN — CALCIUM CARBONATE (ANTACID) CHEW TAB 500 MG 500 MG: 500 CHEW TAB at 11:52

## 2021-06-06 RX ADMIN — ACETAMINOPHEN 650 MG: 325 TABLET, FILM COATED ORAL at 05:55

## 2021-06-06 RX ADMIN — DOCUSATE SODIUM 50 MG AND SENNOSIDES 8.6 MG 1 TABLET: 8.6; 5 TABLET, FILM COATED ORAL at 08:33

## 2021-06-06 RX ADMIN — ESCITALOPRAM OXALATE 30 MG: 20 TABLET ORAL at 08:32

## 2021-06-06 ASSESSMENT — ACTIVITIES OF DAILY LIVING (ADL)
ADLS_ACUITY_SCORE: 15

## 2021-06-06 ASSESSMENT — MIFFLIN-ST. JEOR: SCORE: 1154.75

## 2021-06-06 NOTE — PLAN OF CARE
Neuro: A/Ox4    Respiratory: WNL. IS done independently.     Cardiac: WNL.    GI/: Urostomy with good output overnight. 2 stents in place. Had a BM over night, incontinent, Abdomen soft and nontender.    Incisions/Drains: TITI drain dressing CDI. Midline incision stapled with no drainage.    IV Access: R. Port intact, heparin locked.  .  Pain: Pt. had pain managed with PRN oxycodone 10.    N/V: Denied N/V    Labs: Reviewed.    Diet: Regular diet, tolerating full liquids.    Activity: SBA     Vitals:    06/05/21 1806 06/06/21 0029 06/06/21 0549 06/06/21 0730   BP: 124/69 129/70 (!) 142/70 129/67   BP Location: Right arm Right arm Right arm Right arm   Pulse: 91 92 83 77   Resp: 16 16 17 16   Temp: 99.1  F (37.3  C) 99.2  F (37.3  C) 99.2  F (37.3  C) 97.8  F (36.6  C)   TempSrc: Oral Oral Oral Oral   SpO2: 92% 90% 91% 92%   Weight:    62.3 kg (137 lb 5.6 oz)   Height:

## 2021-06-06 NOTE — PLAN OF CARE
Vitals:    06/05/21 1806 06/06/21 0029 06/06/21 0549 06/06/21 0730   BP: 124/69 129/70 (!) 142/70 129/67   BP Location: Right arm Right arm Right arm Right arm   Pulse: 91 92 83 77   Resp: 16 16 17 16   Temp: 99.1  F (37.3  C) 99.2  F (37.3  C) 99.2  F (37.3  C) 97.8  F (36.6  C)   TempSrc: Oral Oral Oral Oral   SpO2: 92% 90% 91% 92%   Weight:    62.3 kg (137 lb 5.6 oz)   Height:       Pain well managed with oral pain meds. Tolerated regular diet well.  Up in halls. Worked with PT (see notes)   Urology team  assessed pt and explained  plan of care.  Stents and TITI removed.  Pouch patent good amount of urine.   Pouch changed , Lovenox teaching reinforced spouse return demo did well.   Pouch management revised. Pt and spouse expressed understanding.   Port  flushed with heparin and  line de-accessed per protocol.  Pt discharged to home per plan.

## 2021-06-06 NOTE — DISCHARGE SUMMARY
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     Subdural hematoma (H)      Urothelial carcinoma (H)             Procedures:   6/1/21: Procedure(s):  RADICAL CYSTECTOMY  WITH ILEAL CONDUIT CREATION,BILATERAL PELVIC LYMPHADENECTOMY        Non-operative procedures:   None performed          Consultations:   CARE MANAGEMENT / SOCIAL WORK IP CONSULT  OCCUPATIONAL THERAPY ADULT IP CONSULT  PHYSICAL THERAPY ADULT IP CONSULT         Imaging Studies:     Results for orders placed or performed during the hospital encounter of 06/01/21   POC US Guidance Needle Placement    Impression    Bilateral TAP block    XR Abdomen Port 1 View    Narrative    Exam: XR ABDOMEN PORT 1 VIEWS, 6/1/2021 4:40 PM    Indication: s/p radical cystectomy with conduit, confirm b/l stent  placement    Comparison: 4/8/2021    Findings:   Single portable supine AP view of the abdomen. Postsurgical changes of  cystectomy with ileal conduit formation, and placement of bilateral  externalized nephroureteral stents. The left nephroureteral stent is  positioned higher than the right, corresponding to normal anatomic  variation is seen on prior CT. The visualized bowel gas pattern is  nonobstructive. No pneumatosis or portal venous gas. Partially  characterized postsurgical changes of lumbosacral fusion extending  from L2 through S1.      Impression    Impression: Post surgical changes of cystectomy and ileal conduit  formation, with bilateral externalized nephroureteral stents. The left  nephroureteral stent is positioned slightly higher than the right,  which corresponds to the anatomy noted on prior PET/CT. These appear  appropriately positioned.    I have personally reviewed the examination and initial interpretation  and I agree with the findings.    HAFSA ROMERO MD     *Note: Due to a large number of results and/or encounters for the requested time period, some results  have not been displayed. A complete set of results can be found in Results Review.            Medications Prior to Admission:     No medications prior to admission.            Discharge Medications:     Discharge Medication List as of 6/6/2021 12:51 PM      START taking these medications    Details   enoxaparin ANTICOAGULANT (LOVENOX) 40 MG/0.4ML syringe Inject 0.4 mLs (40 mg) Subcutaneous every 24 hours, Disp-12 mL, R-0, E-Prescribe      methocarbamol (ROBAXIN) 750 MG tablet Take 1 tablet (750 mg) by mouth 4 times daily as needed for muscle spasms (or abdominal pain), Disp-30 tablet, R-0, E-Prescribe      oxyCODONE (ROXICODONE) 5 MG tablet Take 1 tablet (5 mg) by mouth every 6 hours as needed for pain, Disp-30 tablet, R-0, Local Print      !! SENNA-docusate sodium (SENNA S) 8.6-50 MG tablet Take 1 tablet by mouth At Bedtime, Disp-30 tablet, R-0, E-Prescribe       !! - Potential duplicate medications found. Please discuss with provider.      CONTINUE these medications which have NOT CHANGED    Details   acetaminophen (TYLENOL) 500 MG tablet Take 1,000 mg by mouth 3 times daily as needed for mild pain (500MG X 2 = 1,000MG), Historical      ALPRAZolam (XANAX) 0.5 MG tablet TAKE 1 TABLET BY MOUTH DAILY AT BEDTIME AS NEEDED FOR SLEEP, Disp-30 tablet, R-0, E-Prescribe      aspirin (ASA) 81 MG chewable tablet Take 1 tablet (81 mg) by mouth daily, Disp-100 tablet, R-3, E-Prescribe      atorvastatin (LIPITOR) 20 MG tablet Take 1 tablet (20 mg) by mouth daily, Disp-90 tablet, R-1, E-Prescribe      carvedilol (COREG) 6.25 MG tablet Take 1 tablet (6.25 mg) by mouth 2 times daily (with meals), Disp-60 tablet, R-11, E-Prescribe      !! escitalopram (LEXAPRO) 10 MG tablet TAKE 1 TABLET BY MOUTH EVERY DAY WITH 20MG TABLETS, Disp-90 tablet, R-1, E-PrescribeWe are filling last refill today and the patientis requesting authorization to refill once thatsupply has been used. Thank you!      LORazepam (ATIVAN) 0.5 MG tablet Take 1  tablet (0.5 mg) by mouth every 4 hours as needed (Anxiety, Nausea/Vomiting or Sleep), Disp-30 tablet, R-3, Local Print      magic mouthwash suspension, diphenhydrAMINE, lidocaine, aluminum-magnesium & simethicone, (FIRST-MOUTHWASH BLM) compounding kit Swish and swallow 5-10 mLs in mouth every 6 hours as needed for mouth sores, Disp-500 mL, R-0, E-Prescribe      meclizine 25 MG CHEW Take 25 mg by mouth every 6 hours as needed for dizziness, Historical      omeprazole (PRILOSEC) 20 MG DR capsule Take 20 mg by mouth daily, Historical      ondansetron (ZOFRAN-ODT) 4 MG ODT tab Take 1 tablet (4 mg) by mouth every 8 hours as needed for nausea, Disp-30 tablet, R-0, E-Prescribe      oxyCODONE-acetaminophen (PERCOCET) 5-325 MG tablet Take 0.5-1 tablets by mouth every 6 hours as needed for severe pain , Historical      polyethylene glycol (MIRALAX/GLYCOLAX) powder Take 17 g by mouth every morning , Historical      prochlorperazine (COMPAZINE) 10 MG tablet Take 0.5 tablets (5 mg) by mouth every 6 hours as needed (Nausea/Vomiting), Disp-30 tablet, R-3, E-Prescribe      !! senna-docusate (SENOKOT-S/PERICOLACE) 8.6-50 MG tablet Take 2 tablets by mouth 2 times daily, Disp-60 tablet, R-0, E-Prescribe      !! escitalopram (LEXAPRO) 20 MG tablet TAKE 1 TABLET BY MOUTH EVERY DAY WITH 10MG TABLETS, Disp-90 tablet, R-1, E-PrescribeWe are filling last refill today and the patientis requesting authorization to refill once thatsupply has been used. Thank you!       !! - Potential duplicate medications found. Please discuss with provider.                 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    Please refer to the full operative summary for details.           Hospital Course:   The patient's  hospital course was unremarkable.  Michaela Dorman recovered as anticipated and experienced no post-operative complications.      On POD#5 patient was ambulating without assitance, tolerating the discharge diet, had pain controlled with PO medications to go home with, and requiring no IV medications or fluids. Patient was discharged home with appropriate contact information, follow-up and instructions as seen below in the discharge paperwork.         Final Pathology Result:   Pending at time of discharge         Discharge Instructions and Follow-Up:     Discharge Procedure Orders   Home care nursing referral   Referral Priority: Routine Referral Type: Home Health Therapies & Aides   Number of Visits Requested: 1     MD face to face encounter   Order Comments: Documentation of Face to Face and Certification for Home Health Services    I certify that patient: Michaela Dorman is under my care and that I, or a nurse practitioner or physician's assistant working with me, had a face-to-face encounter that meets the physician face-to-face encounter requirements with this patient on: 6/3/2021.    This encounter with the patient was in whole, or in part, for the following medical condition, which is the primary reason for home health care: s/p radical cystectomy with ileal conduit and BPLND .    I certify that, based on my findings, the following services are medically necessary home health services: Nursing, Occupational Therapy and Physical Therapy.    My clinical findings support the need for the above services because: Nurse is needed: To provide caregiver training to assist with: urostomy management.., Occupational Therapy Services are needed to assess and treat cognitive ability and address ADL safety due to impairment in endurance. and Physical Therapy Services are needed to assess and treat the following functional impairments: mobility.    Further, I certify that my clinical findings support that this patient is  homebound (i.e. absences from home require considerable and taxing effort and are for medical reasons or [employer] services or infrequently or of short duration when for other reasons) because: Requires assistance of another person or specialized equipment to access medical services because patient: Requires supervision of another for safe transfer...    Based on the above findings. I certify that this patient is confined to the home and needs intermittent skilled nursing care, physical therapy and/or speech therapy.  The patient is under my care, and I have initiated the establishment of the plan of care.  This patient will be followed by a physician who will periodically review the plan of care.  Physician/Provider to provide follow up care: Phani Tapia    Attending hospital physician (the Medicare certified [facility] provider): Dr. Leonardo Robles  Physician Signature: See electronic signature associated with these discharge orders.  Date: 6/3/2021     Reason for your hospital stay   Order Comments: Cystectomy ileal conduit     Discharge Instructions   Order Comments: Diet:   - Regular diet. Eat small frequent meals. Consider adding nutrition shakes several times per day such as Boost or Ensure. Add a multivitamin.   - The most common reason for hospital readmission after bladder cancer surgery is dehydration. Drink plenty of fluids - at least eight 8 ounce glasses of water per day - or aim to keep your urine color pale yellow.     Activity:   - No strenuous exercise for 6 weeks.   - No lifting, pushing, pulling more than 10 pounds for 6 weeks. Take care when pushing with your arms to stand up.  - Do not strain your belly area.  When you bend, sit up or twice, you could strain the area around your incision.    - Do not strain with bowel movements.    - Do not drive until you can press the brake pedal quickly and fully without pain.   - Do not operate a motor vehicle while taking narcotic pain medications.  "    Medications:   1) PAIN:  Oxycodone is a narcotic medication that has been prescribed for pain.  Narcotics will cause sleepiness and constipation, therefore it is best to stop or reduce them as soon as you can and switch to using acetaminophen (Tylenol) and/or ibuprofen (Advil/Motrin), taken as directed on the packaging.  Keep in mind that certain narcotics can contain acetaminophen, also called \"APAP\" on prescription bottles.  Do not take more than 4,000mg of Tylenol (acetaminophen/ APAP) from all sources in any 24 hour period since this can cause liver damage. Do not take more than 2,400 mg of ibuprofen (Motrin/Advil) from all sources in any 24 hour period since this can cause kidney damage.  Never drive, operate machinery or drink alcoholic beverages while you are taking narcotic pain medications.     2) CONSTIPATION: Pericolace (senna/docusate sodium) can be taken twice daily for prevention of constipation since surgery, pain medications and bladder spasm medications can all make you constipated.  Please reduce or stop pericolace if you develop loose stools. Other over the counter solutions such as prune juice, miralax, fiber products, senna, and dulcolax can also be used. If you are taking the pericolace but still have not had a bowel movement in 3 days, start over-the-counter Milk of Magnesia taken twice daily until you have a nice bowel movement.  Call the office with any concerns.      3) ANTICOAGULATION:  - Lovenox (enoxaparen) - 40mg daily subcutaneous injections should be continued for 30 days total since surgery.  Your nurse will show you how to administer these.    Incisions:   - Daily showering is important, and you may get incisions wet starting 48 hours after surgery.    - Do not scrub incisions or soak in a bath, pool, hot tub, etc. For 4 weeks.   - Staples will be removed at your post-operative appointment   - It is preferable for the incision to be left uncovered, but you may cover with gauze " if needed for comfort or to protect clothing from drainage.     Urostomy / Ileal Conduit  - Follow standard instructions as provided by the Wound Ostomy Care Nurses  - Measure daily urine output and record values on a diary.  Bring this information with you when you followup with Dr. Robles.  You should be making at least 1000mL of urine each 24 hour period.  If you are not meeting this goal you should increase your fluid intake.     Supplies:  1) Ostomy supplies to get started  2) Drain sponges for drain site  3) Drain cares  4) Lovenox teaching    Follow-Up:   - Call your primary care provider to touch base regarding your recent admission.    - Follow up with Dr. Robles as scheduled  - Call or return sooner than your regularly scheduled visit if you develop any of the following:  Fever (greater than 101.3F) or flu-like symptoms, uncontrolled pain, uncontrolled nausea or vomiting, as well as increased redness, swelling, or drainage from your wound.    Phone numbers:  - Nursing phone helpline at the [facility] (8A-5P M-F):  [phone].    - Nights or weekends, call [phone] and ask the  to page the urology resident on call.   - For emergencies, always call [phone]            Discharge Disposition:   Discharged to Home      Condition at discharge: Good    --    Alondra Connor MD  Urology Resident    9:11 AM, 6/6/2021

## 2021-06-06 NOTE — PROGRESS NOTES
"Urology  Progress Note    - Afebrile, VSS  - Reports good pain control with oral pain meds  - Tolerating regular diet, still with low appetite; with no nausea or vomiting  - Passing flatus, no bowel movements  - Ready to discharge today    Exam  /67 (BP Location: Right arm)   Pulse 77   Temp 97.8  F (36.6  C) (Oral)   Resp 16   Ht 1.676 m (5' 6\")   Wt 62.3 kg (137 lb 5.6 oz)   SpO2 92%   BMI 22.17 kg/m    No acute distress  Unlabored breathing on room air  Abdomen soft, appropriately tender, nondistended. Stoma pink and healthy with stents in place, clear yellow urine in tubing  Incision closed with staples, no erythema or lorrie-incisional drainage  TITI serosanguinous    I/O (24 hours/since midnight)  UOP 3350/none recorded  TITI 295/35    Labs (6/4)  WBC 11.1 (13.6)  Hgb 8.8 (8.6)  Cr 0.79 (0.7)     Assessment/Plan  70 year old female with PMHx DL, HTN, GERD, CRI (baseline Cr 1), breast cancer s/p chemoradiation and muscle invasive bladder cancer s/p neoadjuvant chemotherapy POD#5 s/p radical cystectomy with ileal conduit and BPLND.    Neuro: continue scheduled tylenol, PRN oxycodone for pain control. Continue PTA lexapro, PRN xanax, PRN meclizine  CV: no acute concerns - PRN metoprolol for HTN  Pulm: incentive spirometry while awake  FEN/GI: regular diet. Senna/doc and miralax; no entereg given chronic pain. PRN compaxzine and zofran  Endo: no acute concerns  : Remove bilateral ureteral stents today. Recheck TITI creatinine 3 hours post removal, if normal will remove TITI drain. WOC consult for stoma teaching  Heme/ID: Received 1 unit 6/3 for Hgb 6.4; Hgb stable since  Activity: as tolerated  PPx: SCDs. Lovenox 40mg QD, needs teaching  Dispo: likely dc today    Plan discussed with Dr Margaret Connor MD  Urology PGY4    "

## 2021-06-06 NOTE — PLAN OF CARE
Vitals:    06/05/21 0750 06/05/21 1648 06/05/21 1716 06/05/21 1806   BP: (!) 154/71 130/67 110/61 124/69   BP Location: Right arm Right arm  Right arm   Pulse: 85 101 101 91   Resp: 16 16 16 16   Temp: 99  F (37.2  C) 97.7  F (36.5  C) 99.3  F (37.4  C) 99.1  F (37.3  C)   TempSrc: Oral Oral Oral Oral   SpO2: 93% 91% 92% 92%   Weight:       Height:       At 4:45 pm vitals pt triggered sepsis.   Lactate for Sepsis    blood draw done,  Result 0.9 .  Continue POC

## 2021-06-07 ENCOUNTER — PATIENT OUTREACH (OUTPATIENT)
Dept: CARE COORDINATION | Facility: CLINIC | Age: 71
End: 2021-06-07

## 2021-06-07 ENCOUNTER — MYC MEDICAL ADVICE (OUTPATIENT)
Dept: ONCOLOGY | Facility: CLINIC | Age: 71
End: 2021-06-07

## 2021-06-07 DIAGNOSIS — Z71.89 OTHER SPECIFIED COUNSELING: ICD-10-CM

## 2021-06-07 NOTE — TELEPHONE ENCOUNTER
Chart review and noted patient also reaching out to [place] and nursing called patient.  Called patient to check in.  Patient reports they address all of her questions and concerns.  They reminded her that her WBC's have been up and not down.  Patient will use salt/baking soda rinses as instructed by Oncology nurse from [place] and reach out with worsening.    Juan José Hernandez RN, BSN, OCN  6/7/2021, 2:39 PM

## 2021-06-07 NOTE — PROGRESS NOTES
[facility]: Post-Discharge Note  SITUATION                                                      Admission:    Admission Date: 06/01/21   Reason for Admission: 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  Discharge:   Discharge Date: 06/06/21  Discharge Diagnosis: 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    BACKGROUND                                                      6/1/21: Procedure(s):  RADICAL CYSTECTOMY  WITH ILEAL CONDUIT CREATION,BILATERAL PELVIC LYMPHADENECTOMY    ASSESSMENT      Discharge Assessment  Patient reports symptoms are: Improved  Does the patient have all of their medications?: Yes  Does patient know what their new medications are for?: Yes  Does patient have a follow-up appointment scheduled?: Yes  Does patient have any other questions or concerns?: Yes    Post-op  Did the patient have surgery or a procedure: Yes  Incision: healing  Drainage: No  Bleeding: moderate  Fever: No  Chills: No  Incision site pain: No  Eating & Drinking: eating and drinking without complaints/concerns      PLAN                                                      Outpatient Plan:     Future Appointments   Date Time Provider Department Center   6/16/2021  1:45 PM Leonardo Robles MD [facility]   7/14/2021  8:00 AM NL LAB [facility]   7/15/2021  2:00 PM Syeda Ferguson MD [facility]   7/22/2021 10:30 AM Leonardo Robles MD [facility]   8/9/2021 10:00 [facility]   8/10/2021  9:00 AM Akiko Smith MD [facility] MA

## 2021-06-07 NOTE — PLAN OF CARE
Occupational Therapy Discharge Summary    Reason for therapy discharge:    Discharged to home.    Progress towards therapy goal(s). See goals on Care Plan in [facility] electronic health record for goal details.  Goals partially met.  Barriers to achieving goals:   discharge from facility.    Therapy recommendation(s):    No further therapy is recommended.

## 2021-06-07 NOTE — TELEPHONE ENCOUNTER
Contacted patient to discuss mouth sores. She reports she is having mouth sores again as she had in the past. She is using magic mouthwash which is helpful for the discomfort. Denies fever or any sign of infection.     Writer advised she also start salt/baking soda rinses every 2 hours while awake. Continue with MMW for comfort. Notify clinic if symptoms are worsening or any concern for infection.      Will route to Stanislav Guzman NP for any additional recommendations.     Lali Garcia, MARY GRACEN, RN, PHN, OCN  Oncology Care Coordinator  [facility]

## 2021-06-07 NOTE — TELEPHONE ENCOUNTER
Talked to patient and she explained it to be clear pinkish drainage coming out of vaginal area just like what was coming out of the TITI drain before.  Message sent to dr Robles about this  Patient has no fever or extra pain  Tori Monroy LPN Staff Nurse

## 2021-06-07 NOTE — PLAN OF CARE
Physical Therapy Discharge Summary    Reason for therapy discharge:    Discharged to home.    Progress towards therapy goal(s). See goals on Care Plan in [facility] electronic health record for goal details.  Goals met    Therapy recommendation(s):    Continue home walking program.

## 2021-06-09 ENCOUNTER — MYC MEDICAL ADVICE (OUTPATIENT)
Dept: UROLOGY | Facility: CLINIC | Age: 71
End: 2021-06-09

## 2021-06-09 ENCOUNTER — TELEPHONE (OUTPATIENT)
Dept: UROLOGY | Facility: CLINIC | Age: 71
End: 2021-06-09

## 2021-06-09 NOTE — TELEPHONE ENCOUNTER
----- Message from Leonardo Robles MD sent at 6/7/2021  5:20 PM CDT -----  Some vaginal drainage is normal. Will slow down with time.    Cristopher  ----- Message -----  From: Tori Monroy LPN  Sent: 6/7/2021  12:09 PM CDT  To: Leonardo Robles MD    Surgery 6/1  she is having drainage out of her vagina  she states it is slightly pink and clear and no mucus or smell She states it looks like the drainage that was in the nilam drain.  No extra pain  No fever any concerns Tori Monroy LPN Staff Nurse'

## 2021-06-09 NOTE — TELEPHONE ENCOUNTER
Called patient to review recommendations for scheduling an appointment with an CORI for mouth sores.  Patient reports that the salt/baking soda rinses were working and she even noted that when she stopped they started to return.  Patient refuses an appointment at this time.  Would like to wait another day.  Will follow up tomorrow.    Juan José Hernandez RN, BSN, OCN  6/9/2021, 10:18 AM

## 2021-06-09 NOTE — TELEPHONE ENCOUNTER
[facility] Call Center    Phone Message    May a detailed message be left on voicemail: yes     Reason for Call: Other: Michaela calling to report that she forgot to take her injection last night. She is wondering if she should do it this moring or just wait until 5pmm? Please reach out to her to discuss. Thanks!     Action Taken: Message routed to:  [facility] ([facility] Uro    Travel Screening: Not Applicable

## 2021-06-09 NOTE — PROGRESS NOTES
Called patient and told her to hold the lovenox till tonight but donot forget top take it tonight Tori Monroy, GEMMAN Staff Nurse

## 2021-06-09 NOTE — TELEPHONE ENCOUNTER
Called dr liriano care team and told to hold till tonight but please make every effort to remember to do it kajal Monroy, RENEE Staff Nurse

## 2021-06-09 NOTE — TELEPHONE ENCOUNTER
Yes   Do not take additional doses     Message text      You routed conversation to Stanislav Guzman APRN CNP 1 hour ago (8:07 AM)      Michaela Dorman Goran, APRN CNP 2 hours ago (7:55 AM)        I forgot to take my blood clot medication shot last night at 5. Shall I do it now

## 2021-06-09 NOTE — TELEPHONE ENCOUNTER
Reviewed with Michaela. She took her lovenox this morning  She stated she also reached out to Urology who told her to wait until this evening. Will update them as well.   Michaela is aware not to take any additional doses for the missed dose as it is ordered only once/ day.

## 2021-06-10 ENCOUNTER — TELEPHONE (OUTPATIENT)
Dept: FAMILY MEDICINE | Facility: OTHER | Age: 71
End: 2021-06-10

## 2021-06-10 ENCOUNTER — PATIENT OUTREACH (OUTPATIENT)
Dept: UROLOGY | Facility: CLINIC | Age: 71
End: 2021-06-10

## 2021-06-10 NOTE — TELEPHONE ENCOUNTER
Home Care RN calling back to check status as they will need orders today so they can go out tomorrow.

## 2021-06-10 NOTE — TELEPHONE ENCOUNTER
[facility] now requests orders and shares plan of care/discharge summaries for some patients through [employer].  Please REPLY TO THIS MESSAGE OR ROUTE BACK TO THE AUTHOR in order to give authorization for orders when needed.  This is considered a verbal order, you will still receive a faxed copy of orders for signature.  Thank you for your assistance in improving collaboration for our patients.    ORDER REQUEST TO MD:  1- Clinician order requests  SN 2w4 and 3PRN  2- Medication reconciliation acknowledgement/orders  Duplicate therapy warning between Roxicodone and oxycodone/acetaminophen  3- Wounds  Leave surgical wound JOVANA, staples to be removed in clinic. Ok to cover w/ gauze for comfort. Assess daily for s/s of infection. OK to discharge wound care once wound has healed.   4- Urostomy  Change urostomy appliance 2x/week and PRN, ok for caregiver or pt to perform on non nursing days.     Home Care Admission SBAR  S- Pt admitted to home care     B- Pt was dx d w/ bladder cancer and now has a cystectomy with ileal conduit. Original plan was to do Chemo through June, then have a 2 month break and then have cystectomy but pt was not tolerating the chemo and the cystectomy was planned sooner. Pt denies going to have chemo. She has oncology F/U in July. Surgeon F/U on 6/14/21. PMH: breast cancer in remission, chronic back pain, carotid stenosis, DDD, CDK3, malignant neoplasm of bladder and urethra, adjustment disorder    A- A&Ox5, no confusion and easily retains education. VSS. Her BP does run low as her normal. When SBP gets into low 90 s or below she does get dizziness and reports to MD. SOB w/ stairs. Using a cane for ambulation, needs A1 for any stairs. No coughing. Surgical wound on mid abdomen has staples in place, edges of wounds well approximated, no drainage, left JOVANA, no s/s of infection. Pt s urostomy bag is due to be change on 6/11 so pt did not want to change the bag today and a SNV is requested  for tomorrow. Pt does use a ostomy ring under the appliance due to a stomach crease making the adherence difficult. Stoma noted through bag to be beefy red in color, urine output straw yellow, some mucous noted in bag.  did one bag change for the pt but ultimately pt wants to become independent. Pt s out put over night is about 2000ml and she hasn t been measuring during the day as she is meeting her minimum output at night. Pt has loose stools and educated to stop the senna and continue the miralax to see if that helps stools be soft but formed. Pt VU. Generalized weakness noted from recent surgery, pt previously very active. Must rest after activity and reports feeling exhausted. Hx of 2 falls in past year. Pain is all the time but is decreasing each day. Pain is well controlled w/ oral pain meds, rest and repo. Regular diet w/ good appetite. No recent weight loss reported. No other hospitalizations in past year. No ER visits in past year. Pt is knowledgeable of her medications and takes from the bottles instead of setting up in a med box. She is on lovenox for 30days and her  administers this for her.   Edu provided today on post op instructions: You should be making at least 1000mL of urine each 24 hour period. Regular diet, drink plenty of fluids. No strenuous exercise for 6 weeks. No lifting/pushing/pulling >10lbs. Do not strain w/ BMs. 40mg QD SQ lovenox for 30days post surgery. Daily showering is important and it is ok to get incisions wet at this time. Do not scrub incisions or soak them for 4 weeks. Staples will be removed in clinic. Preferrable to have incisions to be left uncovered but it is ok to cover with gauze if needed for comfort.  Pt at risk for: falls, infection, poor pain management, poor nutrition, inability to manage urostomy on her own, anxiety.     R- Recommend SN for mental and physical assessments and education on new urostomy, pain management, medication admin, infection,  nutrition. PT/OT/HHA refused by pt.

## 2021-06-15 ENCOUNTER — MYC MEDICAL ADVICE (OUTPATIENT)
Dept: FAMILY MEDICINE | Facility: OTHER | Age: 71
End: 2021-06-15

## 2021-06-16 ENCOUNTER — MYC MEDICAL ADVICE (OUTPATIENT)
Dept: FAMILY MEDICINE | Facility: OTHER | Age: 71
End: 2021-06-16

## 2021-06-16 ENCOUNTER — OFFICE VISIT (OUTPATIENT)
Dept: UROLOGY | Facility: CLINIC | Age: 71
End: 2021-06-16
Payer: COMMERCIAL

## 2021-06-16 VITALS — HEART RATE: 74 BPM | DIASTOLIC BLOOD PRESSURE: 74 MMHG | SYSTOLIC BLOOD PRESSURE: 123 MMHG

## 2021-06-16 DIAGNOSIS — C67.8 MALIGNANT NEOPLASM OF OVERLAPPING SITES OF BLADDER (H): Primary | ICD-10-CM

## 2021-06-16 DIAGNOSIS — F41.9 ANXIETY: Primary | ICD-10-CM

## 2021-06-16 DIAGNOSIS — G89.4 CHRONIC PAIN SYNDROME: ICD-10-CM

## 2021-06-16 PROCEDURE — 99024 POSTOP FOLLOW-UP VISIT: CPT | Performed by: UROLOGY

## 2021-06-16 ASSESSMENT — PAIN SCALES - GENERAL: PAINLEVEL: MILD PAIN (3)

## 2021-06-16 NOTE — PROGRESS NOTES
CHIEF COMPLAINT   It was my pleasure to see Michaela Dorman who is a 70 year old female for follow-up of bladder cancer.      HPI   Michaela Dorman is a very pleasant 70 year old female who presents with a history of bladder cancer. She underwent cystectomy with ileal conduit 6/1/2021. Has recovered from surgery without complication. Mild vaginal discharge, but improving. Stage [id number] disease. Eating well. No ongoing pain.    Cystectomy with ileal conduit 6/1/2021  FINAL DIAGNOSIS:   A. Right distal ureteral margin:   - Negative for malignancy     B. Left distal ureteral margin:   - Negative for malignancy     C. 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 in the periurethral ducts at   urethral margin   - Pathologic stage: [id number]   - Benign uterus, cervix, vaginal wall, ovaries and fallopian tubes with   physiological changes   - See synoptic report     D. Lymph nodes, left pelvic:   - Five benign lymph nodes     E. Lymph nodes, right pelvic:   - Five benign lymph nodes     F. Final left distal ureteral margin:   - Negative for malignancy     G. Right distal final ureteral margin:   - Negative for malignancy     PHYSICAL EXAM  Patient is a 70 year old  female   Vitals: not currently breastfeeding.  General Appearance Adult: There is no height or weight on file to calculate BMI.  Alert, no acute distress, oriented  Lungs: no respiratory distress, or pursed lip breathing  Abdomen: soft, nontender, no organomegaly or masses  Incision healing well; staples in place  Stoma pink and healthy with clear urine return  Back: no CVAT  Neuro: Alert, oriented, speech and mentation normal  Psych: affect and mood normal    ASSESSMENT and PLAN  70 year old female with stage ypT2N0 bladder cancer s/p radical  cystectomy and ileal conduit 6/1/2021. Pathology reviewed today. She did have report of CIS in the lorrie-urethral ducts at the urethral margin. Unclear clinical significance of this given extent of urethral resection, but will discuss further with medical oncololgy. Would favor observation for now. We otherwise discussed bladder cancer and the rationale for ongoing surveillance. Will plan CT and labs in 3 months. Ongoing restrictions and expectations for recovery were reviewed today.    Staples removed today  Follow-up with medical oncology next month, as scheduled  Follow-up 3 months with CT and labs    Leonardo Robles MD  Urology  [facility]

## 2021-06-16 NOTE — PATIENT INSTRUCTIONS
Return in 3 months with labs and imaging.    It was a pleasure meeting with you today.  Thank you for allowing me and my team the privilege of caring for you today.  YOU are the reason we are here, and I truly hope we provided you with the excellent service you deserve.  Please let us know if there is anything else we can do for you so that we can be sure you are leaving completely satisfied with your care experience.        Gi Kaur CMA

## 2021-06-16 NOTE — NURSING NOTE
Chief Complaint   Patient presents with     Surgical Followup     2 week post op        Patient Active Problem List   Diagnosis     Enthesopathy of hip region     Family history of stroke (cerebrovascular)     Trochanteric bursitis     Advanced directives, counseling/discussion     Health Care Home     Hypertension goal BP (blood pressure) < 140/90     Baker's cyst of knee     Patella-femoral syndrome     Adjustment disorder with depressed mood     Essential hypertension     Malignant neoplasm of upper-outer quadrant of left female breast (H)     Encounter for antineoplastic chemotherapy     S/P bilateral mastectomy     Anxiety     Hyperlipidemia LDL goal <130     S/P reverse total shoulder arthroplasty, right     Prophylactic antibiotic for dental procedure indicated due to prior joint replacement     Chronic pain syndrome     Lumbar radiculopathy     Foraminal stenosis of lumbar region     Central stenosis of spinal canal     DDD (degenerative disc disease), lumbar     CKD (chronic kidney disease) stage 3, GFR 30-59 ml/min     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)     Magallanes's neuroma of right foot     Bilateral impacted cerumen     S/P lumbar fusion     Anemia     Carotid stenosis, bilateral L80%, R40%     Carotid artery plaque, bilateral     POSSIBLE Right internal carotid artery aneurysm     Symptomatic stenosis of left carotid artery     Personal history of malignant neoplasm of breast     Acute cystitis with hematuria     Benign paroxysmal positional vertigo, bilateral     Personal history of malignant neoplasm of bladder     Malignant neoplasm of overlapping sites of bladder (H)     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)     Chemotherapy-induced neutropenia (H)     Thrombocytopenia (H)     Anemia in neoplastic disease       Allergies   Allergen Reactions     Oxybutynin      Patient reports symptoms of nausea and mind fogginess       Current  Outpatient Medications   Medication Sig Dispense Refill     acetaminophen (TYLENOL) 500 MG tablet Take 1,000 mg by mouth 3 times daily as needed for mild pain (500MG X 2 = 1,000MG)       ALPRAZolam (XANAX) 0.5 MG tablet TAKE 1 TABLET BY MOUTH DAILY AT BEDTIME AS NEEDED FOR SLEEP 30 tablet 0     aspirin (ASA) 81 MG chewable tablet Take 1 tablet (81 mg) by mouth daily 100 tablet 3     atorvastatin (LIPITOR) 20 MG tablet Take 1 tablet (20 mg) by mouth daily 90 tablet 1     carvedilol (COREG) 6.25 MG tablet Take 1 tablet (6.25 mg) by mouth 2 times daily (with meals) 60 tablet 11     enoxaparin ANTICOAGULANT (LOVENOX) 40 MG/0.4ML syringe Inject 0.4 mLs (40 mg) Subcutaneous every 24 hours 12 mL 0     escitalopram (LEXAPRO) 10 MG tablet TAKE 1 TABLET BY MOUTH EVERY DAY WITH 20MG TABLETS (Patient taking differently: Take 10 mg by mouth daily Takes with 20 mg tablet for total daily dose of 30 mg in the morning.) 90 tablet 1     escitalopram (LEXAPRO) 20 MG tablet TAKE 1 TABLET BY MOUTH EVERY DAY WITH 10MG TABLETS (Patient taking differently: Take 20 mg by mouth daily Takes with 20 mg tablet for total daily dose of 30 mg in the morning.) 90 tablet 1     LORazepam (ATIVAN) 0.5 MG tablet Take 1 tablet (0.5 mg) by mouth every 4 hours as needed (Anxiety, Nausea/Vomiting or Sleep) 30 tablet 3     magic mouthwash suspension, diphenhydrAMINE, lidocaine, aluminum-magnesium & simethicone, (FIRST-MOUTHWASH BLM) compounding kit Swish and swallow 5-10 mLs in mouth every 6 hours as needed for mouth sores 500 mL 0     meclizine 25 MG CHEW Take 25 mg by mouth every 6 hours as needed for dizziness       methocarbamol (ROBAXIN) 750 MG tablet Take 1 tablet (750 mg) by mouth 4 times daily as needed for muscle spasms (or abdominal pain) 30 tablet 0     omeprazole (PRILOSEC) 20 MG DR capsule Take 20 mg by mouth daily       ondansetron (ZOFRAN-ODT) 4 MG ODT tab Take 1 tablet (4 mg) by mouth every 8 hours as needed for nausea 30 tablet 0      oxyCODONE-acetaminophen (PERCOCET) 5-325 MG tablet Take 0.5-1 tablets by mouth every 6 hours as needed for severe pain        polyethylene glycol (MIRALAX/GLYCOLAX) powder Take 17 g by mouth every morning        prochlorperazine (COMPAZINE) 10 MG tablet Take 0.5 tablets (5 mg) by mouth every 6 hours as needed (Nausea/Vomiting) 30 tablet 3     senna-docusate (SENOKOT-S/PERICOLACE) 8.6-50 MG tablet Take 2 tablets by mouth 2 times daily (Patient taking differently: Take 2 tablets by mouth 2 times daily as needed for constipation ) 60 tablet 0     SENNA-docusate sodium (SENNA S) 8.6-50 MG tablet Take 1 tablet by mouth At Bedtime 30 tablet 0       Social History     Tobacco Use     Smoking status: Former Smoker     Packs/day: 3.00     Years: 10.00     Pack years: 30.00     Types: Cigarettes     Quit date: 1979     Years since quittin.5     Smokeless tobacco: Never Used     Tobacco comment: approx. 3 packs daily   Substance Use Topics     Alcohol use: Yes     Alcohol/week: 0.0 standard drinks     Comment: daily glass of wine     Drug use: No       Michaela Dorman comes into clinic today at the request of Dr. Leonardo Robles for staple removal post operatively.    This service provided today was under the direct supervision of Dr. Robles, who was available if needed.    Michaela Dorman is here to have her staples removed post operatively.    Michaela Dorman has a history of bladder cancer.    Incision location: midline abdomen  Visual inspection of incision showed clean sealed suture line without redness or drainage.   Staples removed without difficulty.  Benzoin tincture and Steri-strips were placed per protocol.   Patient was instructed to leave them in place for 7-10 days. It is okay to shower with these in place, no need to cover. After this time the Steri-strips will start loosen and can be removed.    All questions answered and Michaela Dorman expressed understanding and is following up.    Gi  NATHANIEL Kaur,   6/16/2021  3:59 PM

## 2021-06-17 ENCOUNTER — TELEPHONE (OUTPATIENT)
Dept: FAMILY MEDICINE | Facility: OTHER | Age: 71
End: 2021-06-17

## 2021-06-17 DIAGNOSIS — Z53.9 DIAGNOSIS NOT YET DEFINED: Primary | ICD-10-CM

## 2021-06-17 PROCEDURE — 99207 PR MD CERTIFICATION HHA PATIENT: CPT | Performed by: PHYSICIAN ASSISTANT

## 2021-06-17 RX ORDER — BUSPIRONE HYDROCHLORIDE 5 MG/1
TABLET ORAL
Qty: 150 TABLET | Refills: 0 | Status: SHIPPED | OUTPATIENT
Start: 2021-06-17 | End: 2021-09-20

## 2021-06-17 RX ORDER — OXYCODONE AND ACETAMINOPHEN 5; 325 MG/1; MG/1
.5-1 TABLET ORAL EVERY 6 HOURS PRN
Qty: 30 TABLET | Refills: 0 | Status: SHIPPED | OUTPATIENT
Start: 2021-06-17 | End: 2021-08-12

## 2021-06-17 NOTE — TELEPHONE ENCOUNTER
In my MA box (overhead file over my desk) for completion and scan of the document into the medical record.  Electronically signed:    Phani Jason PA-C

## 2021-06-17 NOTE — TELEPHONE ENCOUNTER
Reason for Call:  Form, our goal is to have forms completed with 72 hours, however, some forms may require a visit or additional information.    Type of letter, form or note:  medical    Who is the form from?: Home care    Where did the form come from: form was faxed in    What clinic location was the form placed at?: [facility] [phone]    Where the form was placed: [employer] Form Bin    What number is listed as a contact on the form?: fax [phone]       Additional comments: Please complete and fax    Call taken on 6/17/2021 at 10:42 AM by Nesha Romero

## 2021-06-21 ENCOUNTER — MYC MEDICAL ADVICE (OUTPATIENT)
Dept: FAMILY MEDICINE | Facility: OTHER | Age: 71
End: 2021-06-21

## 2021-06-21 ENCOUNTER — TELEPHONE (OUTPATIENT)
Dept: UROLOGY | Facility: CLINIC | Age: 71
End: 2021-06-21

## 2021-06-21 NOTE — TELEPHONE ENCOUNTER
Patient called and appt made for Thursday  She will call us back on Wednesday with update as well.  She may need labs and imaging Tori Monroy LPN Staff Nurse

## 2021-06-21 NOTE — TELEPHONE ENCOUNTER
Patient called and bleeding from vaginal area much more than before like a normal monthly cycle since Friday.  No extra pain  Pain level is 3 and urine is clear.  States saturates her maxi pad at least 2 times per day Tori Monroy LPN Staff Nurse  No fever

## 2021-06-21 NOTE — TELEPHONE ENCOUNTER
[facility] Call Center    Phone Message    May a detailed message be left on voicemail: yes     Reason for Call: Other: Pt calling to request a call back from Dr. Robles or clinical team to discuss symptoms.  Pt reports she is bleeding.  Please call to discuss at .     Action Taken: Message routed to:  [facility] Urology    Travel Screening: Not Applicable

## 2021-06-21 NOTE — TELEPHONE ENCOUNTER
Patient called and bleeding from vaginal area much more than before like a normal monthly cycle since Friday.  No extra pain  Pain level is 3 and urine is clear.  States saturates her maxi pad at least 2 times per day Tori Monroy LPN Staff Nurse  No fever Message sent to MD

## 2021-06-23 ENCOUNTER — TELEPHONE (OUTPATIENT)
Dept: UROLOGY | Facility: CLINIC | Age: 71
End: 2021-06-23

## 2021-06-23 DIAGNOSIS — C67.8 MALIGNANT NEOPLASM OF OVERLAPPING SITES OF BLADDER (H): Primary | ICD-10-CM

## 2021-06-23 NOTE — TELEPHONE ENCOUNTER
SEt up blood test and ct tomorrow morning will move up her appt with dr liriano as well Tori Monroy LPN Staff Nurse

## 2021-06-23 NOTE — TELEPHONE ENCOUNTER
[facility] Call Center    Phone Message    May a detailed message be left on voicemail: yes     Reason for Call: Other: Pt is returning Tori's call and would elodia a call back ASAP at [phone]. Thank you.     Action Taken: Message routed to:  [facility] uro    Travel Screening: Not Applicable

## 2021-06-23 NOTE — TELEPHONE ENCOUNTER
----- Message from Leonardo Robles MD sent at 6/23/2021 10:52 AM CDT -----  Let's have her do a CT urogram, BMP, and CBC.    Cristopher Walker  ----- Message -----  From: Tori Monroy LPN  Sent: 6/23/2021  10:03 AM CDT  To: Leonardo Robles MD    Patient called and she is still bleeding she did say alittle less but changing her pad 5 times per each day and it is saturated.  You mentioned labs and imaging  just let me know and I will get her scheduled Tori Monroy LPN Staff Nurse

## 2021-06-23 NOTE — TELEPHONE ENCOUNTER
Called patient and she is still having blood coming from her vaginal area.  She has saturated 5 pads during waking time and a maxi at night.  Message sent to dr Robles regarding ordering labs and imaging, Tori Monroy LPN Staff Nurse

## 2021-06-24 ENCOUNTER — OFFICE VISIT (OUTPATIENT)
Dept: UROLOGY | Facility: CLINIC | Age: 71
End: 2021-06-24
Payer: COMMERCIAL

## 2021-06-24 ENCOUNTER — ANCILLARY PROCEDURE (OUTPATIENT)
Dept: CT IMAGING | Facility: CLINIC | Age: 71
End: 2021-06-24
Attending: UROLOGY
Payer: COMMERCIAL

## 2021-06-24 VITALS — DIASTOLIC BLOOD PRESSURE: 62 MMHG | SYSTOLIC BLOOD PRESSURE: 109 MMHG | HEART RATE: 79 BPM

## 2021-06-24 DIAGNOSIS — C67.8 MALIGNANT NEOPLASM OF OVERLAPPING SITES OF BLADDER (H): ICD-10-CM

## 2021-06-24 DIAGNOSIS — C67.8 MALIGNANT NEOPLASM OF OVERLAPPING SITES OF BLADDER (H): Primary | ICD-10-CM

## 2021-06-24 DIAGNOSIS — C67.9 UROTHELIAL CARCINOMA OF BLADDER WITH INVASION OF MUSCLE (H): ICD-10-CM

## 2021-06-24 DIAGNOSIS — N76.0 BACTERIAL VAGINOSIS: ICD-10-CM

## 2021-06-24 DIAGNOSIS — B96.89 BACTERIAL VAGINOSIS: ICD-10-CM

## 2021-06-24 LAB
ANION GAP SERPL CALCULATED.3IONS-SCNC: 6 MMOL/L (ref 3–14)
BUN SERPL-MCNC: 30 MG/DL (ref 7–30)
CALCIUM SERPL-MCNC: 9.6 MG/DL (ref 8.5–10.1)
CHLORIDE SERPL-SCNC: 106 MMOL/L (ref 94–109)
CO2 SERPL-SCNC: 24 MMOL/L (ref 20–32)
CREAT SERPL-MCNC: 1.1 MG/DL (ref 0.52–1.04)
ERYTHROCYTE [DISTWIDTH] IN BLOOD BY AUTOMATED COUNT: 14.8 % (ref 10–15)
GFR SERPL CREATININE-BSD FRML MDRD: 50 ML/MIN/{1.73_M2}
GLUCOSE SERPL-MCNC: 100 MG/DL (ref 70–99)
HCT VFR BLD AUTO: 30 % (ref 35–47)
HGB BLD-MCNC: 9.6 G/DL (ref 11.7–15.7)
MAGNESIUM SERPL-MCNC: 2.1 MG/DL (ref 1.6–2.3)
MCH RBC QN AUTO: 32.1 PG (ref 26.5–33)
MCHC RBC AUTO-ENTMCNC: 32 G/DL (ref 31.5–36.5)
MCV RBC AUTO: 100 FL (ref 78–100)
PLATELET # BLD AUTO: 389 10E9/L (ref 150–450)
POTASSIUM SERPL-SCNC: 4.7 MMOL/L (ref 3.4–5.3)
RBC # BLD AUTO: 2.99 10E12/L (ref 3.8–5.2)
SODIUM SERPL-SCNC: 135 MMOL/L (ref 133–144)
WBC # BLD AUTO: 13.5 10E9/L (ref 4–11)

## 2021-06-24 PROCEDURE — 83735 ASSAY OF MAGNESIUM: CPT | Performed by: INTERNAL MEDICINE

## 2021-06-24 PROCEDURE — 80048 BASIC METABOLIC PNL TOTAL CA: CPT | Performed by: PATHOLOGY

## 2021-06-24 PROCEDURE — 85027 COMPLETE CBC AUTOMATED: CPT | Performed by: PATHOLOGY

## 2021-06-24 PROCEDURE — 36415 COLL VENOUS BLD VENIPUNCTURE: CPT | Performed by: PATHOLOGY

## 2021-06-24 PROCEDURE — 74178 CT ABD&PLV WO CNTR FLWD CNTR: CPT | Performed by: RADIOLOGY

## 2021-06-24 PROCEDURE — 99024 POSTOP FOLLOW-UP VISIT: CPT | Performed by: UROLOGY

## 2021-06-24 PROCEDURE — 250N000011 HC RX IP 250 OP 636: Performed by: INTERNAL MEDICINE

## 2021-06-24 RX ORDER — METRONIDAZOLE 500 MG/1
500 TABLET ORAL 2 TIMES DAILY
Qty: 14 TABLET | Refills: 0 | Status: SHIPPED | OUTPATIENT
Start: 2021-06-24 | End: 2021-07-01

## 2021-06-24 RX ORDER — IOPAMIDOL 755 MG/ML
84 INJECTION, SOLUTION INTRAVASCULAR ONCE
Status: COMPLETED | OUTPATIENT
Start: 2021-06-24 | End: 2021-06-24

## 2021-06-24 RX ORDER — HEPARIN SODIUM (PORCINE) LOCK FLUSH IV SOLN 100 UNIT/ML 100 UNIT/ML
5 SOLUTION INTRAVENOUS ONCE
Status: COMPLETED | OUTPATIENT
Start: 2021-06-24 | End: 2021-06-24

## 2021-06-24 RX ADMIN — Medication 5 ML: at 10:36

## 2021-06-24 RX ADMIN — IOPAMIDOL 84 ML: 755 INJECTION, SOLUTION INTRAVASCULAR at 08:51

## 2021-06-24 ASSESSMENT — PAIN SCALES - GENERAL: PAINLEVEL: MILD PAIN (3)

## 2021-06-24 NOTE — PROGRESS NOTES
CHIEF COMPLAINT   It was my pleasure to see Michaela Dorman who is a 70 year old female for follow-up of bladder cancer.      HPI  Michaela Dorman is a very pleasant 70 year old female who presents with a history of bladder cancer. She underwent cystectomy with ileal conduit 6/1/2021. Has recovered from surgery without complication.  Stage [id number] disease. Eating well. No ongoing pain.    Returns today given episode of more significant vaginal discharge with question of bleeding earlier this week. This has since improved, but here for scans and labs.    HGB stable to improved 9.6. Cr at 1.10.    CT Urogram 6/24/2021  IMPRESSION:   1.  Cystectomy and creation of urinary diversion.  2.  No evidence of recurrent or metastatic disease in the abdomen or  pelvis.     Cystectomy with ileal conduit 6/1/2021  FINAL DIAGNOSIS:   A. Right distal ureteral margin:   - Negative for malignancy     B. Left distal ureteral margin:   - Negative for malignancy     C. 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 in the periurethral ducts at   urethral margin   - Pathologic stage: [id number]   - Benign uterus, cervix, vaginal wall, ovaries and fallopian tubes with   physiological changes   - See synoptic report     D. Lymph nodes, left pelvic:   - Five benign lymph nodes     E. Lymph nodes, right pelvic:   - Five benign lymph nodes     F. Final left distal ureteral margin:   - Negative for malignancy     G. Right distal final ureteral margin:   - Negative for malignancy     PHYSICAL EXAM  Patient is a 70 year old  female   Vitals: Blood pressure 109/62, pulse 79, not currently breastfeeding.  General Appearance Adult: There is no height or weight on file to calculate BMI.  Alert, no acute distress, oriented  Lungs: no  respiratory distress, or pursed lip breathing  Abdomen: soft, nontender, no organomegaly or masses  Incision well-healed   Stoma pink and healthy  Urine clear  Back no CVAT  Neuro: Alert, oriented, speech and mentation normal  Psych: affect and mood normal  : Pelvic exam performed today; mild mucous and serous discharge per vagina; anterior suture line palpably intact; no significant bloody discharge    ASSESSMENT and PLAN  70 year old female with stage ypT2N0 bladder cancer s/p radical cystectomy and ileal conduit 6/1/2021. Pathology reviewed today. She did have report of CIS in the lorrie-urethral ducts at the urethral margin. Unclear clinical significance of this given extent of urethral resection, but will discuss further with medical oncololgy.  Regarding her vaginal discharge, this has slowed and pelvic exam largely unremarkable today, however did have some mucous discharge. Will empirically treat for infection, possibly BV, and continue to observe. She otherwise will follow-up as planned.     - Metronidazole x 7 days  - Follow-up with medical oncology next month, as scheduled  - Follow-up 3 months with CT and labs    Leonardo Robles MD  Urology  [facility]

## 2021-06-24 NOTE — NURSING NOTE
Chief Complaint   Patient presents with     Port Draw     Labs drawn via port by rn in lab.     Port previously accessed with 20g 3/4 inch power needle by RN, labs collected, line flushed with saline and heparin.     Jean Gamez RN

## 2021-06-26 DIAGNOSIS — F41.9 ANXIETY: ICD-10-CM

## 2021-06-26 DIAGNOSIS — F43.21 ADJUSTMENT DISORDER WITH DEPRESSED MOOD: ICD-10-CM

## 2021-06-28 ENCOUNTER — MYC MEDICAL ADVICE (OUTPATIENT)
Dept: FAMILY MEDICINE | Facility: OTHER | Age: 71
End: 2021-06-28

## 2021-06-28 ENCOUNTER — PRE VISIT (OUTPATIENT)
Dept: NEPHROLOGY | Facility: CLINIC | Age: 71
End: 2021-06-28

## 2021-06-28 RX ORDER — ALPRAZOLAM 0.5 MG
TABLET ORAL
Qty: 90 TABLET | Refills: 0 | Status: SHIPPED | OUTPATIENT
Start: 2021-06-28 | End: 2021-12-06

## 2021-06-29 ENCOUNTER — MEDICAL CORRESPONDENCE (OUTPATIENT)
Dept: HEALTH INFORMATION MANAGEMENT | Facility: CLINIC | Age: 71
End: 2021-06-29

## 2021-06-30 ENCOUNTER — TELEPHONE (OUTPATIENT)
Dept: FAMILY MEDICINE | Facility: OTHER | Age: 71
End: 2021-06-30

## 2021-06-30 NOTE — TELEPHONE ENCOUNTER
Reason for Call:  Form, our goal is to have forms completed with 72 hours, however, some forms may require a visit or additional information.    Type of letter, form or note:  Home Health Certification    Who is the form from?: Home care    Where did the form come from: form was faxed in    What clinic location was the form placed at?: [facility] [phone]    Where the form was placed: [employer] Box/Folder    What number is listed as a contact on the form?: [phone]       Additional comments: Please complete form and return to [phone]    Call taken on 6/30/2021 at 3:36 PM by Virginie Caruso

## 2021-07-01 DIAGNOSIS — Z53.9 DIAGNOSIS NOT YET DEFINED: Primary | ICD-10-CM

## 2021-07-07 DIAGNOSIS — F43.21 ADJUSTMENT DISORDER WITH DEPRESSED MOOD: ICD-10-CM

## 2021-07-07 DIAGNOSIS — F41.9 ANXIETY: ICD-10-CM

## 2021-07-07 RX ORDER — ESCITALOPRAM OXALATE 10 MG/1
TABLET ORAL
Qty: 90 TABLET | Refills: 1 | OUTPATIENT
Start: 2021-07-07

## 2021-07-13 RX ORDER — HEPARIN SODIUM (PORCINE) LOCK FLUSH IV SOLN 100 UNIT/ML 100 UNIT/ML
5 SOLUTION INTRAVENOUS
Status: CANCELLED | OUTPATIENT
Start: 2021-07-13

## 2021-07-14 ENCOUNTER — LAB (OUTPATIENT)
Dept: LAB | Facility: CLINIC | Age: 71
End: 2021-07-14
Payer: COMMERCIAL

## 2021-07-14 DIAGNOSIS — C50.412 MALIGNANT NEOPLASM OF UPPER-OUTER QUADRANT OF LEFT FEMALE BREAST, UNSPECIFIED ESTROGEN RECEPTOR STATUS (H): ICD-10-CM

## 2021-07-14 LAB
ALBUMIN SERPL-MCNC: 3.9 G/DL (ref 3.4–5)
ALP SERPL-CCNC: 60 U/L (ref 40–150)
ALT SERPL W P-5'-P-CCNC: 30 U/L (ref 0–50)
ANION GAP SERPL CALCULATED.3IONS-SCNC: 2 MMOL/L (ref 3–14)
AST SERPL W P-5'-P-CCNC: 23 U/L (ref 0–45)
BASOPHILS # BLD AUTO: 0 10E3/UL (ref 0–0.2)
BASOPHILS NFR BLD AUTO: 1 %
BILIRUB SERPL-MCNC: 0.4 MG/DL (ref 0.2–1.3)
BUN SERPL-MCNC: 29 MG/DL (ref 7–30)
CALCIUM SERPL-MCNC: 9.4 MG/DL (ref 8.5–10.1)
CANCER AG27-29 SERPL-ACNC: 27 U/ML (ref 0–39)
CHLORIDE BLD-SCNC: 108 MMOL/L (ref 94–109)
CO2 SERPL-SCNC: 28 MMOL/L (ref 20–32)
CREAT SERPL-MCNC: 1.03 MG/DL (ref 0.52–1.04)
EOSINOPHIL # BLD AUTO: 0.3 10E3/UL (ref 0–0.7)
EOSINOPHIL NFR BLD AUTO: 4 %
ERYTHROCYTE [DISTWIDTH] IN BLOOD BY AUTOMATED COUNT: 18.1 % (ref 10–15)
GFR SERPL CREATININE-BSD FRML MDRD: 55 ML/MIN/1.73M2
GLUCOSE BLD-MCNC: 91 MG/DL (ref 70–99)
HCT VFR BLD AUTO: 31.3 % (ref 35–47)
HGB BLD-MCNC: 10 G/DL (ref 11.7–15.7)
IMM GRANULOCYTES # BLD: 0 10E3/UL
IMM GRANULOCYTES NFR BLD: 1 %
LYMPHOCYTES # BLD AUTO: 1.3 10E3/UL (ref 0.8–5.3)
LYMPHOCYTES NFR BLD AUTO: 20 %
MCH RBC QN AUTO: 32.4 PG (ref 26.5–33)
MCHC RBC AUTO-ENTMCNC: 31.9 G/DL (ref 31.5–36.5)
MCV RBC AUTO: 101 FL (ref 78–100)
MONOCYTES # BLD AUTO: 0.5 10E3/UL (ref 0–1.3)
MONOCYTES NFR BLD AUTO: 8 %
NEUTROPHILS # BLD AUTO: 4.3 10E3/UL (ref 1.6–8.3)
NEUTROPHILS NFR BLD AUTO: 66 %
NRBC # BLD AUTO: 0 10E3/UL
NRBC BLD AUTO-RTO: 0 /100
PLATELET # BLD AUTO: 305 10E3/UL (ref 150–450)
POTASSIUM BLD-SCNC: 5.2 MMOL/L (ref 3.4–5.3)
PROT SERPL-MCNC: 7.6 G/DL (ref 6.8–8.8)
RBC # BLD AUTO: 3.09 10E6/UL (ref 3.8–5.2)
SODIUM SERPL-SCNC: 138 MMOL/L (ref 133–144)
WBC # BLD AUTO: 6.5 10E3/UL (ref 4–11)

## 2021-07-14 PROCEDURE — 86300 IMMUNOASSAY TUMOR CA 15-3: CPT | Performed by: INTERNAL MEDICINE

## 2021-07-14 PROCEDURE — 36415 COLL VENOUS BLD VENIPUNCTURE: CPT

## 2021-07-14 PROCEDURE — 80053 COMPREHEN METABOLIC PANEL: CPT | Performed by: INTERNAL MEDICINE

## 2021-07-14 PROCEDURE — 85025 COMPLETE CBC W/AUTO DIFF WBC: CPT | Performed by: INTERNAL MEDICINE

## 2021-07-15 ENCOUNTER — ONCOLOGY VISIT (OUTPATIENT)
Dept: ONCOLOGY | Facility: CLINIC | Age: 71
End: 2021-07-15
Payer: COMMERCIAL

## 2021-07-15 VITALS
WEIGHT: 136.4 LBS | SYSTOLIC BLOOD PRESSURE: 120 MMHG | TEMPERATURE: 97 F | OXYGEN SATURATION: 97 % | BODY MASS INDEX: 21.92 KG/M2 | HEART RATE: 88 BPM | HEIGHT: 66 IN | DIASTOLIC BLOOD PRESSURE: 60 MMHG

## 2021-07-15 DIAGNOSIS — F43.21 ADJUSTMENT DISORDER WITH DEPRESSED MOOD: ICD-10-CM

## 2021-07-15 DIAGNOSIS — C67.9 UROTHELIAL CARCINOMA OF BLADDER WITH INVASION OF MUSCLE (H): ICD-10-CM

## 2021-07-15 DIAGNOSIS — I10 ESSENTIAL HYPERTENSION: ICD-10-CM

## 2021-07-15 DIAGNOSIS — E78.5 HYPERLIPIDEMIA LDL GOAL <130: ICD-10-CM

## 2021-07-15 DIAGNOSIS — C67.8 MALIGNANT NEOPLASM OF OVERLAPPING SITES OF BLADDER (H): Primary | ICD-10-CM

## 2021-07-15 DIAGNOSIS — C50.412 MALIGNANT NEOPLASM OF UPPER-OUTER QUADRANT OF LEFT FEMALE BREAST, UNSPECIFIED ESTROGEN RECEPTOR STATUS (H): ICD-10-CM

## 2021-07-15 DIAGNOSIS — I10 HYPERTENSION GOAL BP (BLOOD PRESSURE) < 140/90: ICD-10-CM

## 2021-07-15 PROCEDURE — 99215 OFFICE O/P EST HI 40 MIN: CPT | Performed by: INTERNAL MEDICINE

## 2021-07-15 ASSESSMENT — PAIN SCALES - GENERAL: PAINLEVEL: NO PAIN (0)

## 2021-07-15 ASSESSMENT — MIFFLIN-ST. JEOR: SCORE: 1150.46

## 2021-07-15 NOTE — LETTER
"    7/15/2021         RE: Michaela Dorman  [id number]  [place] [address]        Dear Colleague,    Thank you for referring your patient, Michaela Dorman, to the [facility]. Please see a copy of my visit note below.    Oncology Rooming Note    July 15, 2021 1:44 PM   Michaela Dorman is a 70 year old female who presents for:    Chief Complaint   Patient presents with     Oncology Clinic Visit     Urothelial carcinoma of bladder, breast     Results     labs-7/14/21     Initial Vitals: There were no vitals taken for this visit. Estimated body mass index is 22.17 kg/m  as calculated from the following:    Height as of 6/1/21: 1.676 m (5' 6\").    Weight as of 6/6/21: 62.3 kg (137 lb 5.6 oz). There is no height or weight on file to calculate BSA.  Data Unavailable Comment: Data Unavailable   No LMP recorded. Patient is postmenopausal.  Allergies reviewed: Yes  Medications reviewed: Yes    Medications: Medication refills not needed today.  Pharmacy name entered into EPIC:    [facility] - [facility] [place], [place] - [address] [facility] - [address] [facility] - [address]  [id number] - [place], [place] - [address] [place] [address]    Clinical concerns: None. Concerns reviewed with Dr. 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 The bladder cancer was invading the muscle, stage cT2. She is s/p TURBT and PET/CT 4/8/2021 showed likely residual tumor in the left trigone. She started neoadjuvant chemotherapy with cisplatin (split dose) 4/20. Her Cr was 0.9. 4/27 her Cr was 1.9. Cycle 2 she received carbo/gem 5/4.  She had radical cystectomy with ileal conduit on June 1, 2021.      Interval History:  Patient is here today to follow-up after her surgery.  Surgical pathology came back with invasive high-grade urothelial carcinoma.  Carcinoma invaded the muscularis propria.  Urothelial carcinoma in situ is present in the periurothelial ducts at the urothelial origin.  Distal urothelial margin was negative   Right distal urothelial margin was negative.  Left distal urothelial margin was negative for malignancy.  Lymph nodes were negative for malignancy. stage is T2BN0.  She is recovering well from surgery.  She denies any recent weight loss or night sweats or fever or chills patient denies any bone pain or aches.  She denies any shortness of breath or cough or wheezing.    Review Of Systems:  Constitutional: Negative for fever, chills, and night sweats.  Skin: negative.  Eyes: negative.  Ears/Nose/Throat: negative.  Respiratory: No shortness of breath, dyspnea on exertion, cough, or hemoptysis.  Cardiovascular: negative.  Gastrointestinal: negative.  Genitourinary: negative.  Musculoskeletal: negative.  Neurologic: negative.  Psychiatric: negative.  Hematologic/Lymphatic/Immunologic: negative.  Endocrine: negative.    All other ROS negative unless mentioned in interval history.    Past medical, social, surgical, and family histories reviewed.    Allergies:  Allergies as of 07/15/2021 - Reviewed 07/15/2021   Allergen Reaction Noted     Oxybutynin  03/19/2021       Current Medications:  Current Outpatient Medications   Medication Sig Dispense Refill     acetaminophen (TYLENOL) 500 MG tablet Take 1,000 mg by mouth 3 times  daily as needed for mild pain (500MG X 2 = 1,000MG)       ALPRAZolam (XANAX) 0.5 MG tablet TAKE 1 TABLET BY MOUTH DAILY AT BEDTIME AS NEEDED FOR SLEEP 90 tablet 0     aspirin (ASA) 81 MG chewable tablet Take 1 tablet (81 mg) by mouth daily 100 tablet 3     atorvastatin (LIPITOR) 20 MG tablet Take 1 tablet (20 mg) by mouth daily 90 tablet 1     busPIRone (BUSPAR) 5 MG tablet Take 1 tablet (5 mg) by mouth 2 times daily for 5 days, THEN 2 tablets (10 mg) 2 times daily for 5 days, THEN 3 tablets (15 mg) 2 times daily for 20 days. 150 tablet 0     carvedilol (COREG) 6.25 MG tablet Take 1 tablet (6.25 mg) by mouth 2 times daily (with meals) 60 tablet 11     escitalopram (LEXAPRO) 10 MG tablet TAKE 1 TABLET BY MOUTH EVERY DAY WITH 20MG TABLETS (Patient taking differently: Take 10 mg by mouth daily Takes with 20 mg tablet for total daily dose of 30 mg in the morning.) 90 tablet 1     escitalopram (LEXAPRO) 20 MG tablet TAKE 1 TABLET BY MOUTH EVERY DAY WITH 10MG TABLETS (Patient taking differently: Take 20 mg by mouth daily Takes with 20 mg tablet for total daily dose of 30 mg in the morning.) 90 tablet 1     LORazepam (ATIVAN) 0.5 MG tablet Take 1 tablet (0.5 mg) by mouth every 4 hours as needed (Anxiety, Nausea/Vomiting or Sleep) 30 tablet 3     meclizine 25 MG CHEW Take 25 mg by mouth every 6 hours as needed for dizziness       methocarbamol (ROBAXIN) 750 MG tablet Take 1 tablet (750 mg) by mouth 4 times daily as needed for muscle spasms (or abdominal pain) 30 tablet 0     omeprazole (PRILOSEC) 20 MG DR capsule Take 20 mg by mouth daily       ondansetron (ZOFRAN-ODT) 4 MG ODT tab Take 1 tablet (4 mg) by mouth every 8 hours as needed for nausea 30 tablet 0     oxyCODONE-acetaminophen (PERCOCET) 5-325 MG tablet Take 0.5-1 tablets by mouth every 6 hours as needed for severe pain 30 tablet 0     polyethylene glycol (MIRALAX/GLYCOLAX) powder Take 17 g by mouth every morning        prochlorperazine (COMPAZINE) 10 MG tablet Take  "0.5 tablets (5 mg) by mouth every 6 hours as needed (Nausea/Vomiting) 30 tablet 3     SENNA-docusate sodium (SENNA S) 8.6-50 MG tablet Take 1 tablet by mouth At Bedtime 30 tablet 0     magic mouthwash suspension, diphenhydrAMINE, lidocaine, aluminum-magnesium & simethicone, (FIRST-MOUTHWASH BLM) compounding kit Swish and swallow 5-10 mLs in mouth every 6 hours as needed for mouth sores (Patient not taking: Reported on 7/15/2021) 500 mL 0        Physical Exam:  /60   Pulse 88   Temp 97  F (36.1  C) (Temporal)   Ht 1.676 m (5' 6\")   Wt 61.9 kg (136 lb 6.4 oz)   SpO2 97%   BMI 22.02 kg/m    Wt Readings from Last 12 Encounters:   07/15/21 61.9 kg (136 lb 6.4 oz)   06/06/21 62.3 kg (137 lb 5.6 oz)   05/21/21 64.3 kg (141 lb 11.2 oz)   05/20/21 63.8 kg (140 lb 9.6 oz)   05/19/21 62.3 kg (137 lb 6.4 oz)   05/18/21 62.6 kg (138 lb)   05/12/21 62.5 kg (137 lb 12.8 oz)   05/07/21 63.8 kg (140 lb 11.2 oz)   05/04/21 64.4 kg (141 lb 14.4 oz)   05/03/21 64.9 kg (143 lb)   04/27/21 62.2 kg (137 lb 1.6 oz)   04/20/21 62.1 kg (137 lb)     ECOG performance status: 1  GENERAL APPEARANCE: Healthy, alert and in no acute distress.  HEENT: Sclerae anicteric. PERRLA. Oropharynx without ulcers, lesions, or thrush.  NECK: Supple. No asymmetry or bulky masses.  LYMPHATICS: No palpable cervical, supraclavicular, axillary, or inguinal lymphadenopathy.  RESP: Lungs clear to auscultation bilaterally without rales, rhonchi or wheezes.  CARDIOVASCULAR: Regular rate and rhythm. Normal S1, S2; no S3 or S4. No murmur, gallop, or rub.  ABDOMEN: Soft, nontender. Bowel sounds normal. No palpable organomegaly or masses.  MUSCULOSKELETAL: Extremities without gross deformities noted. No edema of bilateral lower extremities.  SKIN: No suspicious lesions or rashes.  NEURO: Alert and oriented x 3 daratumumab. Cranial nerves II-XII grossly intact.  PSYCHIATRIC: Mentation and affect appear normal.    Laboratory/Imaging Studies:  Lab on 07/14/2021 "   Component Date Value Ref Range Status     CA 27-29 07/14/2021 27  0 - 39 U/mL Final     Sodium 07/14/2021 138  133 - 144 mmol/L Final     Potassium 07/14/2021 5.2  3.4 - 5.3 mmol/L Final     Chloride 07/14/2021 108  94 - 109 mmol/L Final     Carbon Dioxide (CO2) 07/14/2021 28  20 - 32 mmol/L Final     Anion Gap 07/14/2021 2* 3 - 14 mmol/L Final     Urea Nitrogen 07/14/2021 29  7 - 30 mg/dL Final     Creatinine 07/14/2021 1.03  0.52 - 1.04 mg/dL Final     Calcium 07/14/2021 9.4  8.5 - 10.1 mg/dL Final     Glucose 07/14/2021 91  70 - 99 mg/dL Final     Alkaline Phosphatase 07/14/2021 60  40 - 150 U/L Final     AST 07/14/2021 23  0 - 45 U/L Final     ALT 07/14/2021 30  0 - 50 U/L Final     Protein Total 07/14/2021 7.6  6.8 - 8.8 g/dL Final     Albumin 07/14/2021 3.9  3.4 - 5.0 g/dL Final     Bilirubin Total 07/14/2021 0.4  0.2 - 1.3 mg/dL Final     GFR Estimate 07/14/2021 55* >60 mL/min/1.73m2 Final    As of July 11, 2021, eGFR is calculated by the CKD-EPI creatinine equation, without race adjustment. eGFR can be influenced by muscle mass, exercise, and diet. The reported eGFR is an estimation only and is only applicable if the renal function is stable.     WBC Count 07/14/2021 6.5  4.0 - 11.0 10e3/uL Final     RBC Count 07/14/2021 3.09* 3.80 - 5.20 10e6/uL Final     Hemoglobin 07/14/2021 10.0* 11.7 - 15.7 g/dL Final     Hematocrit 07/14/2021 31.3* 35.0 - 47.0 % Final     MCV 07/14/2021 101* 78 - 100 fL Final     MCH 07/14/2021 32.4  26.5 - 33.0 pg Final     MCHC 07/14/2021 31.9  31.5 - 36.5 g/dL Final     RDW 07/14/2021 18.1* 10.0 - 15.0 % Final     Platelet Count 07/14/2021 305  150 - 450 10e3/uL Final     % Neutrophils 07/14/2021 66  % Final     % Lymphocytes 07/14/2021 20  % Final     % Monocytes 07/14/2021 8  % Final     % Eosinophils 07/14/2021 4  % Final     % Basophils 07/14/2021 1  % Final     % Immature Granulocytes 07/14/2021 1  % Final     NRBCs per 100 WBC 07/14/2021 0  <1 /100 Final     Absolute  Neutrophils 07/14/2021 4.3  1.6 - 8.3 10e3/uL Final     Absolute Lymphocytes 07/14/2021 1.3  0.8 - 5.3 10e3/uL Final     Absolute Monocytes 07/14/2021 0.5  0.0 - 1.3 10e3/uL Final     Absolute Eosinophils 07/14/2021 0.3  0.0 - 0.7 10e3/uL Final     Absolute Basophils 07/14/2021 0.0  0.0 - 0.2 10e3/uL Final     Absolute Immature Granulocytes 07/14/2021 0.0  <=0.0 10e3/uL Final     Absolute NRBCs 07/14/2021 0.0  10e3/uL Final        Recent Results (from the past 744 hour(s))   CT Urogram    Narrative    CT ABDOMEN AND PELVIS WITHOUT AND WITH CONTRAST  6/24/2021 9:10 AM    CLINICAL HISTORY: Malignant neoplasm of overlapping sites of bladder  (H).    TECHNIQUE: CT scan of the abdomen and pelvis was performed before and  after injection of IV contrast. Multiplanar reformats were obtained.  Dose reduction techniques were used.  CONTRAST: Isovue 370, 84 cc    COMPARISON: 4/8/2021, 3/24/2021    FINDINGS:   Right kidney and ureter: No urinary tract calculus. No hydronephrosis  or hydroureter. No evidence of renal or ureteral mass.    Left kidney and ureter: No urinary tract calculus. No hydronephrosis  or hydroureter. No evidence of renal or ureteral mass.    Urinary bladder: Urinary bladder has been removed, and there is a  urinary diversion ileal conduit to the right lower quadrant.    LOWER CHEST: Normal.    HEPATOBILIARY: Normal.    PANCREAS: Normal.    SPLEEN: Normal.    ADRENAL GLANDS: Normal.    BOWEL: Normal.    ADDITIONAL FINDINGS: No ascites or lymphadenopathy. Nonaneurysmal  aortic atherosclerosis.    MUSCULOSKELETAL: Multilevel posterior lumbar fusion with  decompression. No destructive bone lesions.      Impression    IMPRESSION:   1.  Cystectomy and creation of urinary diversion.  2.  No evidence of recurrent or metastatic disease in the abdomen or  pelvis.    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  Status post 2 cycles of neoadjuvant chemotherapy complicated with renal impairment.  Surgical resection.  We had a long discussion today about adjuvant chemotherapy.  I felt based on side effects during neoadjuvant therapy and the result of the surgery.  I will not recommend further adjuvant chemotherapy.  We will see the patient back in 2 months time with repeat imaging studies    (F43.21) Adjustment disorder with depressed mood  Patient currently on Lexapro 20 mg orally daily    (I10) Essential hypertension  Blood pressure is currently well controlled.    (E78.5) Hyperlipidemia LDL goal <130  Patient currently on atorvastatin 20 mg orally daily    The patient is ready to learn, no apparent learning barriers were identified.  Diagnosis and treatment plans were explained to the patient. The patient expressed understanding of the content. The patient asked appropriate questions. The patient questions were answered to her satisfaction.    Chart documentation with Dragon Voice recognition Software. Although reviewed after completion, some words and grammatical errors may remain.      Again, thank you for allowing me to participate in the care of your patient.        Sincerely,        Syeda Ferguson MD

## 2021-07-15 NOTE — PROGRESS NOTES
"Oncology Rooming Note    July 15, 2021 1:44 PM   Michaela Dorman is a 70 year old female who presents for:    Chief Complaint   Patient presents with     Oncology Clinic Visit     Urothelial carcinoma of bladder, breast     Results     labs-7/14/21     Initial Vitals: There were no vitals taken for this visit. Estimated body mass index is 22.17 kg/m  as calculated from the following:    Height as of 6/1/21: 1.676 m (5' 6\").    Weight as of 6/6/21: 62.3 kg (137 lb 5.6 oz). There is no height or weight on file to calculate BSA.  Data Unavailable Comment: Data Unavailable   No LMP recorded. Patient is postmenopausal.  Allergies reviewed: Yes  Medications reviewed: Yes    Medications: Medication refills not needed today.  Pharmacy name entered into EPIC:    [facility] - [facility] [place], [place] - [address] [facility] [place], [place] - [address] [place] [address] [facility] - [place], [place] - [address] [place] [address]  [id number] - [place], [place] - [address] [place] [address]    Clinical concerns: None. Concerns reviewed with Dr. Marty Hale, [facility]            "

## 2021-07-16 NOTE — PATIENT INSTRUCTIONS
Please follow up with Oncology in 2 months.  Please schedule imaging prior to follow up appointment.    Scan Date/Time:    Office visit follow up with Oncology Date/Time:     If you have any questions or concerns please feel free to call.    If you need to reschedule please call:  Clinic or Lab Appointment - [phone]  Infusion - [phone]  Imaging - [phone]    Juan José Hernandez RN, BSN, OCN  [facility] [place] [facility]   Oncology/Hematology Care Coordinator RN  [facility]  [phone]    After Hours Oncology Nurse Line - [phone]

## 2021-07-16 NOTE — PROGRESS NOTES
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 The bladder cancer was invading the muscle, stage cT2. She is s/p TURBT and PET/CT 4/8/2021 showed likely residual tumor in the left trigone. She started neoadjuvant chemotherapy with cisplatin (split dose) 4/20. Her Cr was 0.9. 4/27 her Cr was 1.9. Cycle 2 she received carbo/gem 5/4.  She had radical cystectomy with ileal conduit on June 1, 2021.      Interval History:  Patient is here today to follow-up after her surgery.  Surgical pathology came back with invasive high-grade urothelial carcinoma.  Carcinoma invaded the muscularis propria.  Urothelial carcinoma in situ is present in the periurothelial ducts at the urothelial origin.  Distal urothelial margin was negative   Right distal urothelial margin was negative.  Left distal urothelial margin was negative for malignancy.  Lymph nodes were negative for malignancy. stage is T2BN0.  She is recovering well from surgery.  She denies any recent weight loss or night sweats or fever or chills patient denies any bone pain or aches.  She denies any shortness of breath or cough or wheezing.    Review Of Systems:  Constitutional: Negative for fever, chills, and night sweats.  Skin: negative.  Eyes: negative.  Ears/Nose/Throat: negative.  Respiratory: No shortness of breath, dyspnea on exertion, cough, or hemoptysis.  Cardiovascular: negative.  Gastrointestinal: negative.  Genitourinary: negative.  Musculoskeletal:  negative.  Neurologic: negative.  Psychiatric: negative.  Hematologic/Lymphatic/Immunologic: negative.  Endocrine: negative.    All other ROS negative unless mentioned in interval history.    Past medical, social, surgical, and family histories reviewed.    Allergies:  Allergies as of 07/15/2021 - Reviewed 07/15/2021   Allergen Reaction Noted     Oxybutynin  03/19/2021       Current Medications:  Current Outpatient Medications   Medication Sig Dispense Refill     acetaminophen (TYLENOL) 500 MG tablet Take 1,000 mg by mouth 3 times daily as needed for mild pain (500MG X 2 = 1,000MG)       ALPRAZolam (XANAX) 0.5 MG tablet TAKE 1 TABLET BY MOUTH DAILY AT BEDTIME AS NEEDED FOR SLEEP 90 tablet 0     aspirin (ASA) 81 MG chewable tablet Take 1 tablet (81 mg) by mouth daily 100 tablet 3     atorvastatin (LIPITOR) 20 MG tablet Take 1 tablet (20 mg) by mouth daily 90 tablet 1     busPIRone (BUSPAR) 5 MG tablet Take 1 tablet (5 mg) by mouth 2 times daily for 5 days, THEN 2 tablets (10 mg) 2 times daily for 5 days, THEN 3 tablets (15 mg) 2 times daily for 20 days. 150 tablet 0     carvedilol (COREG) 6.25 MG tablet Take 1 tablet (6.25 mg) by mouth 2 times daily (with meals) 60 tablet 11     escitalopram (LEXAPRO) 10 MG tablet TAKE 1 TABLET BY MOUTH EVERY DAY WITH 20MG TABLETS (Patient taking differently: Take 10 mg by mouth daily Takes with 20 mg tablet for total daily dose of 30 mg in the morning.) 90 tablet 1     escitalopram (LEXAPRO) 20 MG tablet TAKE 1 TABLET BY MOUTH EVERY DAY WITH 10MG TABLETS (Patient taking differently: Take 20 mg by mouth daily Takes with 20 mg tablet for total daily dose of 30 mg in the morning.) 90 tablet 1     LORazepam (ATIVAN) 0.5 MG tablet Take 1 tablet (0.5 mg) by mouth every 4 hours as needed (Anxiety, Nausea/Vomiting or Sleep) 30 tablet 3     meclizine 25 MG CHEW Take 25 mg by mouth every 6 hours as needed for dizziness       methocarbamol (ROBAXIN) 750 MG tablet Take 1 tablet (750 mg) by  "mouth 4 times daily as needed for muscle spasms (or abdominal pain) 30 tablet 0     omeprazole (PRILOSEC) 20 MG DR capsule Take 20 mg by mouth daily       ondansetron (ZOFRAN-ODT) 4 MG ODT tab Take 1 tablet (4 mg) by mouth every 8 hours as needed for nausea 30 tablet 0     oxyCODONE-acetaminophen (PERCOCET) 5-325 MG tablet Take 0.5-1 tablets by mouth every 6 hours as needed for severe pain 30 tablet 0     polyethylene glycol (MIRALAX/GLYCOLAX) powder Take 17 g by mouth every morning        prochlorperazine (COMPAZINE) 10 MG tablet Take 0.5 tablets (5 mg) by mouth every 6 hours as needed (Nausea/Vomiting) 30 tablet 3     SENNA-docusate sodium (SENNA S) 8.6-50 MG tablet Take 1 tablet by mouth At Bedtime 30 tablet 0     magic mouthwash suspension, diphenhydrAMINE, lidocaine, aluminum-magnesium & simethicone, (FIRST-MOUTHWASH BLM) compounding kit Swish and swallow 5-10 mLs in mouth every 6 hours as needed for mouth sores (Patient not taking: Reported on 7/15/2021) 500 mL 0        Physical Exam:  /60   Pulse 88   Temp 97  F (36.1  C) (Temporal)   Ht 1.676 m (5' 6\")   Wt 61.9 kg (136 lb 6.4 oz)   SpO2 97%   BMI 22.02 kg/m    Wt Readings from Last 12 Encounters:   07/15/21 61.9 kg (136 lb 6.4 oz)   06/06/21 62.3 kg (137 lb 5.6 oz)   05/21/21 64.3 kg (141 lb 11.2 oz)   05/20/21 63.8 kg (140 lb 9.6 oz)   05/19/21 62.3 kg (137 lb 6.4 oz)   05/18/21 62.6 kg (138 lb)   05/12/21 62.5 kg (137 lb 12.8 oz)   05/07/21 63.8 kg (140 lb 11.2 oz)   05/04/21 64.4 kg (141 lb 14.4 oz)   05/03/21 64.9 kg (143 lb)   04/27/21 62.2 kg (137 lb 1.6 oz)   04/20/21 62.1 kg (137 lb)     ECOG performance status: 1  GENERAL APPEARANCE: Healthy, alert and in no acute distress.  HEENT: Sclerae anicteric. PERRLA. Oropharynx without ulcers, lesions, or thrush.  NECK: Supple. No asymmetry or bulky masses.  LYMPHATICS: No palpable cervical, supraclavicular, axillary, or inguinal lymphadenopathy.  RESP: Lungs clear to auscultation bilaterally " without rales, rhonchi or wheezes.  CARDIOVASCULAR: Regular rate and rhythm. Normal S1, S2; no S3 or S4. No murmur, gallop, or rub.  ABDOMEN: Soft, nontender. Bowel sounds normal. No palpable organomegaly or masses.  MUSCULOSKELETAL: Extremities without gross deformities noted. No edema of bilateral lower extremities.  SKIN: No suspicious lesions or rashes.  NEURO: Alert and oriented x 3 daratumumab. Cranial nerves II-XII grossly intact.  PSYCHIATRIC: Mentation and affect appear normal.    Laboratory/Imaging Studies:  Lab on 07/14/2021   Component Date Value Ref Range Status     CA 27-29 07/14/2021 27  0 - 39 U/mL Final     Sodium 07/14/2021 138  133 - 144 mmol/L Final     Potassium 07/14/2021 5.2  3.4 - 5.3 mmol/L Final     Chloride 07/14/2021 108  94 - 109 mmol/L Final     Carbon Dioxide (CO2) 07/14/2021 28  20 - 32 mmol/L Final     Anion Gap 07/14/2021 2* 3 - 14 mmol/L Final     Urea Nitrogen 07/14/2021 29  7 - 30 mg/dL Final     Creatinine 07/14/2021 1.03  0.52 - 1.04 mg/dL Final     Calcium 07/14/2021 9.4  8.5 - 10.1 mg/dL Final     Glucose 07/14/2021 91  70 - 99 mg/dL Final     Alkaline Phosphatase 07/14/2021 60  40 - 150 U/L Final     AST 07/14/2021 23  0 - 45 U/L Final     ALT 07/14/2021 30  0 - 50 U/L Final     Protein Total 07/14/2021 7.6  6.8 - 8.8 g/dL Final     Albumin 07/14/2021 3.9  3.4 - 5.0 g/dL Final     Bilirubin Total 07/14/2021 0.4  0.2 - 1.3 mg/dL Final     GFR Estimate 07/14/2021 55* >60 mL/min/1.73m2 Final    As of July 11, 2021, eGFR is calculated by the CKD-EPI creatinine equation, without race adjustment. eGFR can be influenced by muscle mass, exercise, and diet. The reported eGFR is an estimation only and is only applicable if the renal function is stable.     WBC Count 07/14/2021 6.5  4.0 - 11.0 10e3/uL Final     RBC Count 07/14/2021 3.09* 3.80 - 5.20 10e6/uL Final     Hemoglobin 07/14/2021 10.0* 11.7 - 15.7 g/dL Final     Hematocrit 07/14/2021 31.3* 35.0 - 47.0 % Final     MCV  07/14/2021 101* 78 - 100 fL Final     MCH 07/14/2021 32.4  26.5 - 33.0 pg Final     MCHC 07/14/2021 31.9  31.5 - 36.5 g/dL Final     RDW 07/14/2021 18.1* 10.0 - 15.0 % Final     Platelet Count 07/14/2021 305  150 - 450 10e3/uL Final     % Neutrophils 07/14/2021 66  % Final     % Lymphocytes 07/14/2021 20  % Final     % Monocytes 07/14/2021 8  % Final     % Eosinophils 07/14/2021 4  % Final     % Basophils 07/14/2021 1  % Final     % Immature Granulocytes 07/14/2021 1  % Final     NRBCs per 100 WBC 07/14/2021 0  <1 /100 Final     Absolute Neutrophils 07/14/2021 4.3  1.6 - 8.3 10e3/uL Final     Absolute Lymphocytes 07/14/2021 1.3  0.8 - 5.3 10e3/uL Final     Absolute Monocytes 07/14/2021 0.5  0.0 - 1.3 10e3/uL Final     Absolute Eosinophils 07/14/2021 0.3  0.0 - 0.7 10e3/uL Final     Absolute Basophils 07/14/2021 0.0  0.0 - 0.2 10e3/uL Final     Absolute Immature Granulocytes 07/14/2021 0.0  <=0.0 10e3/uL Final     Absolute NRBCs 07/14/2021 0.0  10e3/uL Final        Recent Results (from the past 744 hour(s))   CT Urogram    Narrative    CT ABDOMEN AND PELVIS WITHOUT AND WITH CONTRAST  6/24/2021 9:10 AM    CLINICAL HISTORY: Malignant neoplasm of overlapping sites of bladder  (H).    TECHNIQUE: CT scan of the abdomen and pelvis was performed before and  after injection of IV contrast. Multiplanar reformats were obtained.  Dose reduction techniques were used.  CONTRAST: Isovue 370, 84 cc    COMPARISON: 4/8/2021, 3/24/2021    FINDINGS:   Right kidney and ureter: No urinary tract calculus. No hydronephrosis  or hydroureter. No evidence of renal or ureteral mass.    Left kidney and ureter: No urinary tract calculus. No hydronephrosis  or hydroureter. No evidence of renal or ureteral mass.    Urinary bladder: Urinary bladder has been removed, and there is a  urinary diversion ileal conduit to the right lower quadrant.    LOWER CHEST: Normal.    HEPATOBILIARY: Normal.    PANCREAS: Normal.    SPLEEN: Normal.    ADRENAL GLANDS:  Normal.    BOWEL: Normal.    ADDITIONAL FINDINGS: No ascites or lymphadenopathy. Nonaneurysmal  aortic atherosclerosis.    MUSCULOSKELETAL: Multilevel posterior lumbar fusion with  decompression. No destructive bone lesions.      Impression    IMPRESSION:   1.  Cystectomy and creation of urinary diversion.  2.  No evidence of recurrent or metastatic disease in the abdomen or  pelvis.    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  Status post 2 cycles of neoadjuvant chemotherapy complicated with renal impairment.  Surgical resection.  We had a long discussion today about adjuvant chemotherapy.  I felt based on side effects during neoadjuvant therapy and the result of the surgery.  I will not recommend further adjuvant chemotherapy.  We will see the patient back in 2 months time with repeat imaging studies    (F43.21) Adjustment disorder with depressed mood  Patient currently on Lexapro 20 mg orally daily    (I10) Essential hypertension  Blood pressure is currently well controlled.    (E78.5) Hyperlipidemia LDL goal <130  Patient currently on atorvastatin 20 mg orally daily    The patient is ready to learn, no apparent learning barriers were identified.  Diagnosis and treatment plans were explained to the patient. The patient expressed understanding of the content. The patient asked appropriate questions. The patient questions were answered to her satisfaction.    Chart documentation with Dragon Voice recognition Software. Although reviewed after completion, some words and grammatical errors may remain.

## 2021-07-16 NOTE — ASSESSMENT & PLAN NOTE
Michaela Dorman is a 70 year old female with a history of stage 2 ER/TX negative HER2 positive breast cancer s/p neoadjuvant chemo, bilateral mastectomy 2/8/2017 with pCR who has completed a year of herceptin and then was diagnosed with bladder cancer 2/22/2021. The bladder cancer was invading the muscle, stage cT2. She is s/p TURBT and PET/CT 4/8/2021 showed likely residual tumor in the left trigone. She started neoadjuvant chemotherapy with cisplatin (split dose) 4/20. Her Cr was 0.9. 4/27 her Cr was 1.9. Cycle 2 she received carbo/gem 5/4.  She had radical cystectomy with ileal conduit on June 1, 2021.

## 2021-07-19 ENCOUNTER — TELEPHONE (OUTPATIENT)
Dept: ONCOLOGY | Facility: CLINIC | Age: 71
End: 2021-07-19

## 2021-07-19 DIAGNOSIS — C67.9 UROTHELIAL CARCINOMA OF BLADDER WITH INVASION OF MUSCLE (H): ICD-10-CM

## 2021-07-19 DIAGNOSIS — Z51.11 ENCOUNTER FOR ANTINEOPLASTIC CHEMOTHERAPY: ICD-10-CM

## 2021-07-19 DIAGNOSIS — C50.412 MALIGNANT NEOPLASM OF UPPER-OUTER QUADRANT OF LEFT FEMALE BREAST, UNSPECIFIED ESTROGEN RECEPTOR STATUS (H): ICD-10-CM

## 2021-07-19 NOTE — TELEPHONE ENCOUNTER
Reason for Call:  Other call back    Detailed comments: Pt was calling to speak with Juan José about what the plan is for her chemo. Has not heard or been updated and would like a call back.    Phone Number Patient can be reached at: Cell number on file:    Telephone Information:   Mobile [phone]       Best Time: any    Can we leave a detailed message on this number? YES    Call taken on 7/19/2021 at 2:21 PM by Jazmine Pacheco

## 2021-07-20 NOTE — TELEPHONE ENCOUNTER
Patient was notified COLE Can is out of clinic and will return call when she returns to clinic and patient was ok with waiting to hear back. Veronica Hale, CMA

## 2021-07-21 RX ORDER — PROCHLORPERAZINE MALEATE 10 MG
5 TABLET ORAL EVERY 6 HOURS PRN
Qty: 30 TABLET | Refills: 3 | Status: SHIPPED | OUTPATIENT
Start: 2021-07-21 | End: 2021-11-18

## 2021-07-21 NOTE — TELEPHONE ENCOUNTER
Returned patients call.  Plan is for scan with follow up in 2 months.  Ok per Dr. Ferguson to use scan scheduled on 9/23/21.  Patient ok with plan and will call her with follow up when schedule open.    Juan José Hernandez RN, BSN, OCN  7/21/2021, 10:26 AM

## 2021-07-22 ENCOUNTER — MYC MEDICAL ADVICE (OUTPATIENT)
Dept: ONCOLOGY | Facility: CLINIC | Age: 71
End: 2021-07-22

## 2021-07-22 DIAGNOSIS — E78.5 HYPERLIPIDEMIA LDL GOAL <130: ICD-10-CM

## 2021-07-22 RX ORDER — ATORVASTATIN CALCIUM 20 MG/1
TABLET, FILM COATED ORAL
Qty: 90 TABLET | Refills: 1 | Status: SHIPPED | OUTPATIENT
Start: 2021-07-22 | End: 2022-04-27

## 2021-07-22 NOTE — TELEPHONE ENCOUNTER
Called and reviewed with patient that there will be options if cancer returns.  They would discuss risk verses benefits.  Patient is very anxious about not getting chemotherapy after surgery.  Reassured her that the provider will be watching.  Patient also recalled the surgeon stating that she probably won't need chemotherapy either.  Emotional support provided.  Patient reports feeling better and denies questions or concerns.    Juan José Hernandez RN, BSN, OCN  7/22/2021, 4:57 PM

## 2021-07-28 DIAGNOSIS — E78.5 HYPERLIPIDEMIA LDL GOAL <130: ICD-10-CM

## 2021-07-28 RX ORDER — ATORVASTATIN CALCIUM 20 MG/1
TABLET, FILM COATED ORAL
Qty: 90 TABLET | Refills: 1 | OUTPATIENT
Start: 2021-07-28

## 2021-08-05 ENCOUNTER — MYC MEDICAL ADVICE (OUTPATIENT)
Dept: UROLOGY | Facility: CLINIC | Age: 71
End: 2021-08-05

## 2021-08-09 ENCOUNTER — LAB (OUTPATIENT)
Dept: LAB | Facility: CLINIC | Age: 71
End: 2021-08-09
Payer: COMMERCIAL

## 2021-08-09 DIAGNOSIS — D64.9 ANEMIA, UNSPECIFIED TYPE: ICD-10-CM

## 2021-08-09 DIAGNOSIS — N18.31 STAGE 3A CHRONIC KIDNEY DISEASE (H): ICD-10-CM

## 2021-08-09 LAB
ALBUMIN SERPL-MCNC: 4.1 G/DL (ref 3.4–5)
ANION GAP SERPL CALCULATED.3IONS-SCNC: 6 MMOL/L (ref 3–14)
BUN SERPL-MCNC: 25 MG/DL (ref 7–30)
CALCIUM SERPL-MCNC: 9.7 MG/DL (ref 8.5–10.1)
CHLORIDE BLD-SCNC: 106 MMOL/L (ref 94–109)
CO2 SERPL-SCNC: 25 MMOL/L (ref 20–32)
CREAT SERPL-MCNC: 1.25 MG/DL (ref 0.52–1.04)
CREAT UR-MCNC: 110 MG/DL
FERRITIN SERPL-MCNC: 138 NG/ML (ref 8–252)
GFR SERPL CREATININE-BSD FRML MDRD: 43 ML/MIN/1.73M2
GLUCOSE BLD-MCNC: 100 MG/DL (ref 70–99)
HGB BLD-MCNC: 11 G/DL (ref 11.7–15.7)
IRON SATN MFR SERPL: 23 % (ref 15–46)
IRON SERPL-MCNC: 84 UG/DL (ref 35–180)
PHOSPHATE SERPL-MCNC: 3.7 MG/DL (ref 2.5–4.5)
POTASSIUM BLD-SCNC: 5.2 MMOL/L (ref 3.4–5.3)
PROT UR-MCNC: 0.51 G/L
PROT/CREAT 24H UR: 0.46 G/G CR (ref 0–0.2)
PTH-INTACT SERPL-MCNC: 84 PG/ML (ref 18–80)
SODIUM SERPL-SCNC: 137 MMOL/L (ref 133–144)
TIBC SERPL-MCNC: 362 UG/DL (ref 240–430)

## 2021-08-09 PROCEDURE — 83970 ASSAY OF PARATHORMONE: CPT

## 2021-08-09 PROCEDURE — 85018 HEMOGLOBIN: CPT

## 2021-08-09 PROCEDURE — 80069 RENAL FUNCTION PANEL: CPT

## 2021-08-09 PROCEDURE — 36415 COLL VENOUS BLD VENIPUNCTURE: CPT

## 2021-08-09 PROCEDURE — 84156 ASSAY OF PROTEIN URINE: CPT

## 2021-08-09 PROCEDURE — 83550 IRON BINDING TEST: CPT

## 2021-08-09 PROCEDURE — 82728 ASSAY OF FERRITIN: CPT

## 2021-08-10 ENCOUNTER — TELEPHONE (OUTPATIENT)
Dept: NEPHROLOGY | Facility: CLINIC | Age: 71
End: 2021-08-10

## 2021-08-10 ENCOUNTER — VIRTUAL VISIT (OUTPATIENT)
Dept: NEPHROLOGY | Facility: CLINIC | Age: 71
End: 2021-08-10
Payer: COMMERCIAL

## 2021-08-10 DIAGNOSIS — I10 HYPERTENSION GOAL BP (BLOOD PRESSURE) < 140/90: ICD-10-CM

## 2021-08-10 DIAGNOSIS — N18.31 STAGE 3A CHRONIC KIDNEY DISEASE (H): Primary | ICD-10-CM

## 2021-08-10 DIAGNOSIS — D64.9 ANEMIA, UNSPECIFIED TYPE: ICD-10-CM

## 2021-08-10 PROCEDURE — 99213 OFFICE O/P EST LOW 20 MIN: CPT | Mod: GC | Performed by: INTERNAL MEDICINE

## 2021-08-10 NOTE — PROGRESS NOTES
Michaela is a 70 year old who is being evaluated via a billable video visit.      How would you like to obtain your AVS? MyChart  If the video visit is dropped, the invitation should be resent by: Text to cell phone:   Will anyone else be joining your video visit? No      Encouraged pt to take B/P at home prior to appt time.    Danika Scott LPN

## 2021-08-10 NOTE — NURSING NOTE
Attempted to contact pt.  No answer.  msg left on VM.  Tel Enc started    Instructions:  1. Labs again end of August/early September  2. Tentative plan to follow-up in 6 months or sooner as needed     Danika Scott LPN

## 2021-08-10 NOTE — TELEPHONE ENCOUNTER
Attempted to contact pt.  No answer.  Message left on VM to return call.    Calling to assist pt with scheduling Future appts recommended by Dr. Smith at 8/10/21 Video Visit:    Instructions:  1. Labs again end of August/early September  2. Tentative plan to follow-up in 6 months or sooner as needed     Danika Scott LPN

## 2021-08-10 NOTE — PATIENT INSTRUCTIONS
1. Labs again end of August/early September  2. Tentative plan to follow-up in 6 months or sooner as needed

## 2021-08-10 NOTE — PROGRESS NOTES
08/10/2021    CC: elevated creatinine    HPI: Michaela Dorman is a 70 year old  female who presents for evaluation of elevated creatinine.  Jan 5, 2021:  Ms. Jean has had difficulties with recurrent UTIs- on/off for years.  - referred to urology as well - started on bactrim single strength for prophylaxis in late November. Plan in November was to follow-up with Dr. Shaw in urology again and at that time will undergo cystoscopy as well. On review of her creatinine levels, her true baseline seems to be around 0.8 but with episodes of elevation (up to 1.2 in 2012, 1.45 in May 2019, more recently sitting 1.1-1.2 this year). On review of UAs, she has had hematuria; UMaCR typically normal but 40 mg/g in Dec 2020.    Today she reports that she has stopped the bactrim due to ongoing infections despite being on it; off for one week. She is now on ciprofloxacin; also recent difficulties with yeast infections. BP most recently has been low. She feels weak/dizzy with these low pressures - by afternoon is able to walk 3 miles. She is off of hydrochlorothiazide now; 162/75 yesterday later in the day so did take a tab. She is eating/drinking well. No fever/chills. She feels the UTI is gone at this time. No diarrhea. No irregular heart rate appreciated. She reports low blood pressures for years on/off - now 3-4 months with very low pressures.    She is taking celebrex for back pain - had an extensive back surgery in early 2020. Currently taking celebrex once a day. Prior to 2020 was not taking NSAIDs. On prevacid for heart burn - has been on this for long time - no heart burn sxs for a long time. No hx of ulcers/esophagitis. Takes miralax daily. Takes magnesium daily. Addt hx breast cancer with mastectomy/chemo/rx.   - History of Hematuria: no  - Swelling: no  - Hx of UTIs: yes - see above  - Hx of stones: no  - Rashes/Joint pain: no rash; back pain present - hx of surgery  - Family hx of kidney disease: no  - NSAID use: no  other NSAIDs; just celebrex    02/08/21: video visit. At the time of her initial visit, I recommended stopping celebrex, trial stopping prevacid, staying off of hydrochlorothiazide. Creatinine was 1.2 just after that visit, improved to 0.96 on 1/21 and is now 1.12 most recently on 2/3/21. She unfortunately has had ongoing difficulties with urinary frequency, urgency, and dysuria. Bactrim was sent in 2/4/21. She has a cystoscopy planned with  on 2/22/21 with a CT at that time as well. Her cultures have only shown mixed urogenital cher most recently.    Today she reports that she remains on celebrex - was on BID and now daily. She is following with Phani Tapia PA-C for her pain. BP has been good - at least 90/60, 100/62. It is this low despite being off of all BP meds. Heart rate has been 95. She is walking and exercising quite a bit: walking 3 miles per day and elliptical. MWF she also does weights. She feels she is drinking a lot of water/staying hydrated. Early AM dizziness. She tried stopping prevacid after last visit but she had heart burn sxs return - pepcid did not give the relief.      5/3/21: since last visit she was seen by urology and dx with high grade urothelial cancer. She underwent transurethral resection of the bladder tumor in March and was started on cisplatin on 4/20/21. She reports that she was on AZO for all of October and on it up until 3/18. She has an TAMIR now - reports diarrhea started Friday but stopped Sunday afternoon. She admits she has been taking celebrex up until 4/29. She reports that she is no longer with diarrhea and has had good hydration over the weekend.     8/10/21:  Phone visit. (She was unable to get the video software to work.)    Since her last nephrology appointment she has undergone radical cystectomy and ileal conduit creation. Her medical oncologist is currently holding off on adjuvant chemotherapy pending further imaging.    She reports no current issues. She  feels that her urine is about the same as before both in amount and in color. She reports that she keeps well hydrated, 8 cups of water per day.    Recently got a second opinion on her cancer at [place], CT urogram with contrast on 8/4.    No swelling, no difficulty breathing, no palpitations, no change in BM.       Allergies   Allergen Reactions     Oxybutynin      Patient reports symptoms of nausea and mind fogginess       acetaminophen (TYLENOL) 500 MG tablet, Take 1,000 mg by mouth 3 times daily as needed for mild pain (500MG X 2 = 1,000MG)  ALPRAZolam (XANAX) 0.5 MG tablet, TAKE 1 TABLET BY MOUTH DAILY AT BEDTIME AS NEEDED FOR SLEEP  aspirin (ASA) 81 MG chewable tablet, Take 1 tablet (81 mg) by mouth daily  atorvastatin (LIPITOR) 20 MG tablet, TAKE 1 TABLET BY MOUTH EVERY DAY  carvedilol (COREG) 6.25 MG tablet, Take 1 tablet (6.25 mg) by mouth 2 times daily (with meals)  escitalopram (LEXAPRO) 10 MG tablet, TAKE 1 TABLET BY MOUTH EVERY DAY WITH 20MG TABLETS (Patient taking differently: Take 10 mg by mouth daily Takes with 20 mg tablet for total daily dose of 30 mg in the morning.)  escitalopram (LEXAPRO) 20 MG tablet, TAKE 1 TABLET BY MOUTH EVERY DAY WITH 10MG TABLETS (Patient taking differently: Take 20 mg by mouth daily Takes with 20 mg tablet for total daily dose of 30 mg in the morning.)  LORazepam (ATIVAN) 0.5 MG tablet, Take 1 tablet (0.5 mg) by mouth every 4 hours as needed (Anxiety, Nausea/Vomiting or Sleep)  meclizine 25 MG CHEW, Take 25 mg by mouth every 6 hours as needed for dizziness  methocarbamol (ROBAXIN) 750 MG tablet, Take 1 tablet (750 mg) by mouth 4 times daily as needed for muscle spasms (or abdominal pain)  omeprazole (PRILOSEC) 20 MG DR capsule, Take 20 mg by mouth daily  ondansetron (ZOFRAN-ODT) 4 MG ODT tab, Take 1 tablet (4 mg) by mouth every 8 hours as needed for nausea  oxyCODONE-acetaminophen (PERCOCET) 5-325 MG tablet, Take 0.5-1 tablets by mouth every 6 hours as needed for severe  pain  polyethylene glycol (MIRALAX/GLYCOLAX) powder, Take 17 g by mouth every morning   prochlorperazine (COMPAZINE) 10 MG tablet, TAKE 0.5 TABLETS (5 MG) BY MOUTH EVERY 6 HOURS AS NEEDED (NAUSEA/VOMITING)  SENNA-docusate sodium (SENNA S) 8.6-50 MG tablet, Take 1 tablet by mouth At Bedtime  busPIRone (BUSPAR) 5 MG tablet, Take 1 tablet (5 mg) by mouth 2 times daily for 5 days, THEN 2 tablets (10 mg) 2 times daily for 5 days, THEN 3 tablets (15 mg) 2 times daily for 20 days.  magic mouthwash suspension, diphenhydrAMINE, lidocaine, aluminum-magnesium & simethicone, (FIRST-MOUTHWASH BLM) compounding kit, Swish and swallow 5-10 mLs in mouth every 6 hours as needed for mouth sores (Patient not taking: Reported on 7/15/2021)    No current facility-administered medications on file prior to visit.    Exam:  There were no vitals taken for this visit.  GENERAL: Non-distressed on the phone.      Results:   Last Comprehensive Metabolic Panel:  Sodium   Date Value Ref Range Status   08/09/2021 137 133 - 144 mmol/L Final   06/24/2021 135 133 - 144 mmol/L Final     Potassium   Date Value Ref Range Status   08/09/2021 5.2 3.4 - 5.3 mmol/L Final   06/24/2021 4.7 3.4 - 5.3 mmol/L Final     Chloride   Date Value Ref Range Status   08/09/2021 106 94 - 109 mmol/L Final   06/24/2021 106 94 - 109 mmol/L Final     Carbon Dioxide   Date Value Ref Range Status   06/24/2021 24 20 - 32 mmol/L Final     Carbon Dioxide (CO2)   Date Value Ref Range Status   08/09/2021 25 20 - 32 mmol/L Final     Anion Gap   Date Value Ref Range Status   08/09/2021 6 3 - 14 mmol/L Final   06/24/2021 6 3 - 14 mmol/L Final     Glucose   Date Value Ref Range Status   08/09/2021 100 (H) 70 - 99 mg/dL Final   06/24/2021 100 (H) 70 - 99 mg/dL Final     Urea Nitrogen   Date Value Ref Range Status   08/09/2021 25 7 - 30 mg/dL Final   06/24/2021 30 7 - 30 mg/dL Final     Creatinine   Date Value Ref Range Status   08/09/2021 1.25 (H) 0.52 - 1.04 mg/dL Final   06/24/2021  1.10 (H) 0.52 - 1.04 mg/dL Final     GFR Estimate   Date Value Ref Range Status   08/09/2021 43 (L) >60 mL/min/1.73m2 Final     Comment:     As of July 11, 2021, eGFR is calculated by the CKD-EPI creatinine equation, without race adjustment. eGFR can be influenced by muscle mass, exercise, and diet. The reported eGFR is an estimation only and is only applicable if the renal function is stable.   06/24/2021 50 (L) >60 mL/min/[1.73_m2] Final     Comment:     Non  GFR Calc  Starting 12/18/2018, serum creatinine based estimated GFR (eGFR) will be   calculated using the Chronic Kidney Disease Epidemiology Collaboration   (CKD-EPI) equation.       Calcium   Date Value Ref Range Status   08/09/2021 9.7 8.5 - 10.1 mg/dL Final   06/24/2021 9.6 8.5 - 10.1 mg/dL Final             Assessment/Plan:   1. CKD STage 3: at risk for CKD in the setting of hypotension, NSAID use, PPI use, and recurrent UTIs.      She has had a very variable creatinine, her Cr today is within prior limits. May be related to hydration status, her labs look a little bit concentrated. She also reports a recent CT scan with contrast at [place], which may also explain the elevated creatinine seen yesterday.    She is planning on having a CT with contrast in late September, we will check a renal panel earlier in September to assess.    Urine protein/Cr ratio increasing over the course of the year, now 0.46. Also possibly related to concentration and contrast. We will continue to follow.    2. Hypotension: resolved    3. Low back pain:stressed the importance of avoiding NSAIDs - she currently is not taking any.    4. Anemia: Marginally elevated from prior, perhaps hemoconcentrated. On iron supplementation.         Phone appointment, 0904 start, Dr. Smith joined at 0926. Ended 0933.      Attending Note: I have seen and examined this patient and the above note reflects my historical findings, exam findings, and assessment and plan.   Patient  Instructions   1. Labs again end of August/early September  2. Tentative plan to follow-up in 6 months or sooner as needed     Akiko Smith, DO

## 2021-08-11 ENCOUNTER — MYC MEDICAL ADVICE (OUTPATIENT)
Dept: FAMILY MEDICINE | Facility: OTHER | Age: 71
End: 2021-08-11

## 2021-08-11 DIAGNOSIS — G89.4 CHRONIC PAIN SYNDROME: ICD-10-CM

## 2021-08-11 NOTE — TELEPHONE ENCOUNTER
Routing refill request to provider for review/approval because:  Drug not on the [facility] refill protocol     Moira Pinto RN on 8/11/2021 at 1:33 PM        
No

## 2021-08-12 DIAGNOSIS — F43.21 ADJUSTMENT DISORDER WITH DEPRESSED MOOD: ICD-10-CM

## 2021-08-12 DIAGNOSIS — F41.9 ANXIETY: ICD-10-CM

## 2021-08-12 RX ORDER — OXYCODONE AND ACETAMINOPHEN 5; 325 MG/1; MG/1
.5-1 TABLET ORAL EVERY 6 HOURS PRN
Qty: 30 TABLET | Refills: 0 | Status: SHIPPED | OUTPATIENT
Start: 2021-08-12 | End: 2021-09-20

## 2021-08-12 RX ORDER — ESCITALOPRAM OXALATE 20 MG/1
TABLET ORAL
Qty: 90 TABLET | Refills: 1 | Status: SHIPPED | OUTPATIENT
Start: 2021-08-12 | End: 2021-12-06

## 2021-08-12 NOTE — TELEPHONE ENCOUNTER
Pending Prescriptions:                       Disp   Refills    escitalopram (LEXAPRO) 20 MG tablet [Pharm*90 tab*1        Sig: TAKE 1 TABLET BY MOUTH EVERY DAY WITH 10MG TABLETS      Routing refill request to provider for review/approval because:  Labs out of range:  [id number]    Moira Pinto RN on 8/12/2021 at 2:20 PM

## 2021-08-15 ENCOUNTER — MYC MEDICAL ADVICE (OUTPATIENT)
Dept: FAMILY MEDICINE | Facility: OTHER | Age: 71
End: 2021-08-15

## 2021-08-16 ENCOUNTER — MYC MEDICAL ADVICE (OUTPATIENT)
Dept: FAMILY MEDICINE | Facility: OTHER | Age: 71
End: 2021-08-16

## 2021-08-16 ENCOUNTER — TELEPHONE (OUTPATIENT)
Dept: FAMILY MEDICINE | Facility: OTHER | Age: 71
End: 2021-08-16

## 2021-08-16 NOTE — TELEPHONE ENCOUNTER
Reason for Call:  Form, our goal is to have forms completed with 72 hours, however, some forms may require a visit or additional information.    Type of letter, form or note:  medical    Who is the form from?: [employer] (if other please explain)    Where did the form come from: form was faxed in    What clinic location was the form placed at?: [facility] [phone]    Where the form was placed: Team a form bin    What number is listed as a contact on the form?: fax [phone]       Additional comments: Please complete and fax    Call taken on 8/16/2021 at 3:20 PM by Nesha Romero

## 2021-08-24 NOTE — TELEPHONE ENCOUNTER
"Requested Prescriptions   Pending Prescriptions Disp Refills     FLUoxetine (PROZAC) 40 MG capsule [Pharmacy Med Name: FLUOXETINE HCL 40MG CAPS] 30 capsule 0     Sig: TAKE ONE CAPSULE BY MOUTH EVERY DAY. NO FURTHER REFILLS WITHOUT OFFICE VISIT    SSRIs Protocol Passed    5/31/2018  7:46 AM       Passed - Recent (12 mo) or future (30 days) visit within the authorizing provider's specialty    Patient had office visit in the last 12 months or has a visit in the next 30 days with authorizing provider or within the authorizing provider's specialty.  See \"Patient Info\" tab in inbasket, or \"Choose Columns\" in Meds & Orders section of the refill encounter.           Passed - Patient is age 18 or older       Passed - No active pregnancy on record       Passed - No positive pregnancy test in last 12 months        Last ov 02/19/2018  SOO has already been given.  Huddled with [facility].  Ok for 2 weeks.  Needs appointment for further refills.  Please call and help schedule.  Larry Meadows, RN, BSN        "
Spoke with pt and made appt for Tuesday   
Yes

## 2021-08-31 NOTE — TELEPHONE ENCOUNTER
2nd attempt to contact pt.  No answer.  Message left to call and scheduled Future appts recommended by Dr. Smith.    Danika Scott LPN

## 2021-09-07 ENCOUNTER — LAB (OUTPATIENT)
Dept: LAB | Facility: CLINIC | Age: 71
End: 2021-09-07
Payer: COMMERCIAL

## 2021-09-07 DIAGNOSIS — N18.31 STAGE 3A CHRONIC KIDNEY DISEASE (H): ICD-10-CM

## 2021-09-07 LAB
ALBUMIN SERPL-MCNC: 4.2 G/DL (ref 3.4–5)
ANION GAP SERPL CALCULATED.3IONS-SCNC: 5 MMOL/L (ref 3–14)
BUN SERPL-MCNC: 30 MG/DL (ref 7–30)
CALCIUM SERPL-MCNC: 9.7 MG/DL (ref 8.5–10.1)
CHLORIDE BLD-SCNC: 109 MMOL/L (ref 94–109)
CO2 SERPL-SCNC: 23 MMOL/L (ref 20–32)
CREAT SERPL-MCNC: 1.02 MG/DL (ref 0.52–1.04)
GFR SERPL CREATININE-BSD FRML MDRD: 55 ML/MIN/1.73M2
GLUCOSE BLD-MCNC: 104 MG/DL (ref 70–99)
PHOSPHATE SERPL-MCNC: 3.2 MG/DL (ref 2.5–4.5)
POTASSIUM BLD-SCNC: 4.9 MMOL/L (ref 3.4–5.3)
SODIUM SERPL-SCNC: 137 MMOL/L (ref 133–144)

## 2021-09-07 PROCEDURE — 36415 COLL VENOUS BLD VENIPUNCTURE: CPT

## 2021-09-07 PROCEDURE — 80069 RENAL FUNCTION PANEL: CPT

## 2021-09-08 DIAGNOSIS — C67.9 UROTHELIAL CARCINOMA OF BLADDER WITH INVASION OF MUSCLE (H): ICD-10-CM

## 2021-09-08 NOTE — TELEPHONE ENCOUNTER
Pending Prescriptions:                       Disp   Refills    SENNA PLUS 8.6-50 MG tablet [Pharmacy Med *100 ta*0        Sig: TAKE 2 TABLETS BY MOUTH DAILY AT BEDTIME    Routing refill request to provider for review/approval because:  A break in medication  Last filled in june

## 2021-09-09 RX ORDER — ASPIRIN 81 MG
TABLET, DELAYED RELEASE (ENTERIC COATED) ORAL
Qty: 100 TABLET | Refills: 0 | Status: SHIPPED | OUTPATIENT
Start: 2021-09-09 | End: 2021-11-26

## 2021-09-10 ENCOUNTER — PRE VISIT (OUTPATIENT)
Dept: UROLOGY | Facility: CLINIC | Age: 71
End: 2021-09-10

## 2021-09-10 NOTE — TELEPHONE ENCOUNTER
Reason for visit: Follow up w/ CT and labs     Relevant information: Bladder cancer; s/p cystectomy w/ ileal conduit    Records/imaging/labs/orders: CT and labs on 9/21 before visit    Pt called: N/A    At Rooming: Standard

## 2021-09-19 ENCOUNTER — MYC MEDICAL ADVICE (OUTPATIENT)
Dept: FAMILY MEDICINE | Facility: OTHER | Age: 71
End: 2021-09-19

## 2021-09-19 DIAGNOSIS — G89.4 CHRONIC PAIN SYNDROME: ICD-10-CM

## 2021-09-20 RX ORDER — OXYCODONE AND ACETAMINOPHEN 5; 325 MG/1; MG/1
.5-1 TABLET ORAL EVERY 6 HOURS PRN
Qty: 30 TABLET | Refills: 0 | Status: SHIPPED | OUTPATIENT
Start: 2021-09-20 | End: 2021-11-10

## 2021-09-21 ENCOUNTER — ANCILLARY PROCEDURE (OUTPATIENT)
Dept: CT IMAGING | Facility: CLINIC | Age: 71
End: 2021-09-21
Attending: UROLOGY
Payer: COMMERCIAL

## 2021-09-21 ENCOUNTER — LAB (OUTPATIENT)
Dept: LAB | Facility: CLINIC | Age: 71
End: 2021-09-21
Payer: COMMERCIAL

## 2021-09-21 ENCOUNTER — OFFICE VISIT (OUTPATIENT)
Dept: UROLOGY | Facility: CLINIC | Age: 71
End: 2021-09-21
Payer: COMMERCIAL

## 2021-09-21 ENCOUNTER — TELEPHONE (OUTPATIENT)
Dept: UROLOGY | Facility: CLINIC | Age: 71
End: 2021-09-21

## 2021-09-21 VITALS
WEIGHT: 132 LBS | SYSTOLIC BLOOD PRESSURE: 141 MMHG | BODY MASS INDEX: 21.21 KG/M2 | HEIGHT: 66 IN | HEART RATE: 75 BPM | DIASTOLIC BLOOD PRESSURE: 81 MMHG

## 2021-09-21 DIAGNOSIS — C67.8 MALIGNANT NEOPLASM OF OVERLAPPING SITES OF BLADDER (H): ICD-10-CM

## 2021-09-21 DIAGNOSIS — C67.9 UROTHELIAL CARCINOMA OF BLADDER (H): Primary | ICD-10-CM

## 2021-09-21 DIAGNOSIS — Z11.59 ENCOUNTER FOR SCREENING FOR OTHER VIRAL DISEASES: ICD-10-CM

## 2021-09-21 DIAGNOSIS — Z95.828 PORT-A-CATH IN PLACE: Primary | ICD-10-CM

## 2021-09-21 LAB
ANION GAP SERPL CALCULATED.3IONS-SCNC: 6 MMOL/L (ref 3–14)
BUN SERPL-MCNC: 34 MG/DL (ref 7–30)
CALCIUM SERPL-MCNC: 10.1 MG/DL (ref 8.5–10.1)
CHLORIDE BLD-SCNC: 107 MMOL/L (ref 94–109)
CO2 SERPL-SCNC: 25 MMOL/L (ref 20–32)
CREAT SERPL-MCNC: 1.02 MG/DL (ref 0.52–1.04)
ERYTHROCYTE [DISTWIDTH] IN BLOOD BY AUTOMATED COUNT: 14.6 % (ref 10–15)
GFR SERPL CREATININE-BSD FRML MDRD: 55 ML/MIN/1.73M2
GLUCOSE BLD-MCNC: 108 MG/DL (ref 70–99)
HCT VFR BLD AUTO: 37.3 % (ref 35–47)
HGB BLD-MCNC: 12 G/DL (ref 11.7–15.7)
MCH RBC QN AUTO: 33 PG (ref 26.5–33)
MCHC RBC AUTO-ENTMCNC: 32.2 G/DL (ref 31.5–36.5)
MCV RBC AUTO: 103 FL (ref 78–100)
PLATELET # BLD AUTO: 252 10E3/UL (ref 150–450)
POTASSIUM BLD-SCNC: 4.8 MMOL/L (ref 3.4–5.3)
RBC # BLD AUTO: 3.64 10E6/UL (ref 3.8–5.2)
SODIUM SERPL-SCNC: 138 MMOL/L (ref 133–144)
WBC # BLD AUTO: 8.4 10E3/UL (ref 4–11)

## 2021-09-21 PROCEDURE — 99207 PR NO BILLABLE SERVICE THIS VISIT: CPT

## 2021-09-21 PROCEDURE — 80048 BASIC METABOLIC PNL TOTAL CA: CPT | Performed by: PATHOLOGY

## 2021-09-21 PROCEDURE — 74177 CT ABD & PELVIS W/CONTRAST: CPT | Mod: GC | Performed by: RADIOLOGY

## 2021-09-21 PROCEDURE — 99214 OFFICE O/P EST MOD 30 MIN: CPT | Performed by: UROLOGY

## 2021-09-21 PROCEDURE — 71260 CT THORAX DX C+: CPT | Mod: GC | Performed by: RADIOLOGY

## 2021-09-21 PROCEDURE — 36415 COLL VENOUS BLD VENIPUNCTURE: CPT | Performed by: PATHOLOGY

## 2021-09-21 PROCEDURE — 85027 COMPLETE CBC AUTOMATED: CPT | Performed by: PATHOLOGY

## 2021-09-21 RX ORDER — CEFAZOLIN SODIUM 2 G/50ML
2 SOLUTION INTRAVENOUS
Status: CANCELLED | OUTPATIENT
Start: 2021-09-21

## 2021-09-21 RX ORDER — IOPAMIDOL 755 MG/ML
84 INJECTION, SOLUTION INTRAVASCULAR ONCE
Status: COMPLETED | OUTPATIENT
Start: 2021-09-21 | End: 2021-09-21

## 2021-09-21 RX ORDER — CEFAZOLIN SODIUM 2 G/50ML
2 SOLUTION INTRAVENOUS SEE ADMIN INSTRUCTIONS
Status: CANCELLED | OUTPATIENT
Start: 2021-09-21

## 2021-09-21 RX ORDER — HEPARIN SODIUM (PORCINE) LOCK FLUSH IV SOLN 100 UNIT/ML 100 UNIT/ML
500 SOLUTION INTRAVENOUS ONCE
Status: COMPLETED | OUTPATIENT
Start: 2021-09-21 | End: 2021-09-21

## 2021-09-21 RX ADMIN — IOPAMIDOL 84 ML: 755 INJECTION, SOLUTION INTRAVASCULAR at 13:07

## 2021-09-21 RX ADMIN — HEPARIN SODIUM (PORCINE) LOCK FLUSH IV SOLN 100 UNIT/ML 500 UNITS: 100 SOLUTION at 13:21

## 2021-09-21 ASSESSMENT — MIFFLIN-ST. JEOR: SCORE: 1130.5

## 2021-09-21 ASSESSMENT — PAIN SCALES - GENERAL: PAINLEVEL: NO PAIN (0)

## 2021-09-21 NOTE — PROGRESS NOTES
Pre Op Teaching Flowsheet       Pre and Post op Patient Education  Relevant Diagnosis:  Bladder cancer  Surgical procedure:  BIOPSY, VAGINA  Person Involved in teaching: Michaela Dorman      Patient demonstrates understanding of the following:  Date of surgery:  10/6/21  Location of surgery:  [facility] - 5th Floor  History and Physical and any other testing necessary prior to surgery: Yes- PAC  Required time line for completion of History and Physical and any pre-op testing: Yes      Patient demonstrates understanding of the following:  Pre-op bowel prep:  None  Pre-op showering/scrub information with PCMX Soap: Yes  Blood thinner medications discussed and when to stop (if applicable):  Yes  Diabetic Management teaching:  N/A      Infection Prevention:   Patient demonstrates understanding of the following:  Surgical procedure site care taught: at time of discharge  Signs and symptoms of infection taught:  Yes      Post-op follow-up:  Discussed how to contact the hospital, nurse, and clinic scheduling staff if necessary.    Instructional materials used/given/mailed:  [facility] Booklet, Soap..    Surgical instructions packet given to patient in office:  Yes.     referral: No     home:  Yes    Care Giver:      PCP:  Phani Jason MD    Patient is aware of the need for COVID testing and is scheduled to do so.

## 2021-09-21 NOTE — NURSING NOTE
"Chief Complaint   Patient presents with     Follow Up     bladder cancer        Blood pressure (!) 141/81, pulse 75, height 1.676 m (5' 6\"), weight 59.9 kg (132 lb), not currently breastfeeding. Body mass index is 21.31 kg/m .    Patient Active Problem List   Diagnosis     Enthesopathy of hip region     Family history of stroke (cerebrovascular)     Trochanteric bursitis     Advanced directives, counseling/discussion     Health Care Home     Hypertension goal BP (blood pressure) < 140/90     Baker's cyst of knee     Patella-femoral syndrome     Adjustment disorder with depressed mood     Essential hypertension     Malignant neoplasm of upper-outer quadrant of left female breast (H)     Encounter for antineoplastic chemotherapy     S/P bilateral mastectomy     Anxiety     Hyperlipidemia LDL goal <130     S/P reverse total shoulder arthroplasty, right     Prophylactic antibiotic for dental procedure indicated due to prior joint replacement     Chronic pain syndrome     Lumbar radiculopathy     Foraminal stenosis of lumbar region     Central stenosis of spinal canal     DDD (degenerative disc disease), lumbar     CKD (chronic kidney disease) stage 3, GFR 30-59 ml/min     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)     Magallanes's neuroma of right foot     Bilateral impacted cerumen     S/P lumbar fusion     Anemia     Carotid stenosis, bilateral L80%, R40%     Carotid artery plaque, bilateral     POSSIBLE Right internal carotid artery aneurysm     Symptomatic stenosis of left carotid artery     Personal history of malignant neoplasm of breast     Acute cystitis with hematuria     Benign paroxysmal positional vertigo, bilateral     Personal history of malignant neoplasm of bladder     Malignant neoplasm of overlapping sites of bladder (H)     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)     Chemotherapy-induced neutropenia (H)     Thrombocytopenia (H)     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       Allergies   Allergen Reactions     Oxybutynin      Patient reports symptoms of nausea and mind fogginess       Current Outpatient Medications   Medication Sig Dispense Refill     acetaminophen (TYLENOL) 500 MG tablet Take 1,000 mg by mouth 3 times daily as needed for mild pain (500MG X 2 = 1,000MG)       ALPRAZolam (XANAX) 0.5 MG tablet TAKE 1 TABLET BY MOUTH DAILY AT BEDTIME AS NEEDED FOR SLEEP 90 tablet 0     aspirin (ASA) 81 MG chewable tablet Take 1 tablet (81 mg) by mouth daily 100 tablet 3     atorvastatin (LIPITOR) 20 MG tablet TAKE 1 TABLET BY MOUTH EVERY DAY 90 tablet 1     carvedilol (COREG) 6.25 MG tablet Take 1 tablet (6.25 mg) by mouth 2 times daily (with meals) 60 tablet 11     escitalopram (LEXAPRO) 10 MG tablet TAKE 1 TABLET BY MOUTH EVERY DAY WITH 20MG TABLETS (Patient taking differently: Take 10 mg by mouth daily Takes with 20 mg tablet for total daily dose of 30 mg in the morning.) 90 tablet 1     escitalopram (LEXAPRO) 20 MG tablet TAKE 1 TABLET BY MOUTH EVERY DAY WITH 10MG TABLETS 90 tablet 1     LORazepam (ATIVAN) 0.5 MG tablet Take 1 tablet (0.5 mg) by mouth every 4 hours as needed (Anxiety, Nausea/Vomiting or Sleep) 30 tablet 3     magic mouthwash suspension, diphenhydrAMINE, lidocaine, aluminum-magnesium & simethicone, (FIRST-MOUTHWASH BLM) compounding kit Swish and swallow 5-10 mLs in mouth every 6 hours as needed for mouth sores (Patient not taking: Reported on 7/15/2021) 500 mL 0     meclizine 25 MG CHEW Take 25 mg by mouth every 6 hours as needed for dizziness       methocarbamol (ROBAXIN) 750 MG tablet Take 1 tablet (750 mg) by mouth 4 times daily as needed for muscle spasms (or abdominal pain) 30 tablet 0     omeprazole (PRILOSEC) 20 MG DR capsule Take 20 mg by mouth daily       ondansetron (ZOFRAN-ODT) 4 MG ODT tab Take 1 tablet (4 mg) by mouth every 8 hours as needed for nausea 30 tablet 0     oxyCODONE-acetaminophen (PERCOCET) 5-325 MG tablet Take 0.5-1 tablets by  mouth every 6 hours as needed for severe pain 30 tablet 0     polyethylene glycol (MIRALAX/GLYCOLAX) powder Take 17 g by mouth every morning        prochlorperazine (COMPAZINE) 10 MG tablet TAKE 0.5 TABLETS (5 MG) BY MOUTH EVERY 6 HOURS AS NEEDED (NAUSEA/VOMITING) 30 tablet 3     SENNA PLUS 8.6-50 MG tablet TAKE 2 TABLETS BY MOUTH DAILY AT BEDTIME 100 tablet 0       Social History     Tobacco Use     Smoking status: Former Smoker     Packs/day: 3.00     Years: 10.00     Pack years: 30.00     Types: Cigarettes     Quit date: 1979     Years since quittin.8     Smokeless tobacco: Never Used     Tobacco comment: approx. 3 packs daily   Substance Use Topics     Alcohol use: Yes     Alcohol/week: 0.0 standard drinks     Comment: daily glass of wine     Drug use: No       Keren Trevizo  2021  2:20 PM

## 2021-09-21 NOTE — PROGRESS NOTES
CHIEF COMPLAINT  It was my pleasure to see Michaela Dorman who is a 70 year old female for follow-up of bladder cancer.      HPI  Michaela Dorman is a very pleasant 70 year old female who presents with a history of bladder cancer. She underwent cystectomy with ileal conduit 6/1/2021. Has recovered from surgery without complication.  Stage [id number] disease. Eating well. No ongoing pain.  She returns today for surveillance. Urine draining well with no hematuria. No ongoing vaginal drainage.  Cr 1.02  HGB 12.0     CT chest/abd/pelvis 9/24/2021  IMPRESSION: In this patient with a history of bladder cancer who is  now status post cystectomy and right lower quadrant ileal conduit  creation,  1. No convincing findings to suggest metastatic disease in the chest  abdomen or pelvis. Somewhat limited evaluation of the left mid and  distal ureter due to lack of intervening fat planes and metallic  artifact from patient's posterior instrumented fusion hardware.  Recommend that 7 to 10 minute delayed phase images also be obtained at  the time of interval follow-up.  2. Interval increase in size of circumscribed low-density fluid  collection located within the anterior low pelvis measuring up to 5.3  cm (previously measuring 2.4 cm). Collection appears to be located  within or adjacent to the sigmoid colonic wall and may represent a  intramural fluid collection versus an adjacent lymphocele associated  with mass effect and intraluminal narrowing of the sigmoid colon.  Inflammatory changes extend anterior from this collection towards the  anterior abdominal wall, possibly near the level of prior port  placement.  3. Mild mural wall thickening and mucosal enhancement of the  rectosigmoid colon, which appears to be located distal to the  above-mentioned fluid collection abutting or adjacent to the sigmoid  colonic wall. Correlate with clinical symptoms of colitis.  4. Moderate atherosclerotic disease particularly at the takeoff  "of the  superior mesenteric artery suggest clinical correlation for signs of  mesenteric ischemia.  5. Stable esophageal and gastric wall thickening. Recommend  correlation with clinical symptoms.     Cystectomy with ileal conduit 6/1/2021  FINAL DIAGNOSIS:   C. 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 in the periurethral ducts at   urethral margin   - Pathologic stage: [id number]   - Benign uterus, cervix, vaginal wall, ovaries and fallopian tubes with   physiological changes   - See synoptic report     PHYSICAL EXAM  Patient is a 70 year old  female   Vitals: Blood pressure (!) 141/81, pulse 75, height 1.676 m (5' 6\"), weight 59.9 kg (132 lb), not currently breastfeeding.  General Appearance Adult: Body mass index is 21.31 kg/m .  Alert, no acute distress, oriented  Lungs: no respiratory distress, or pursed lip breathing  Abdomen: soft, nontender, no organomegaly or masses  Back: no CVAT  Neuro: Alert, oriented, speech and mentation normal  Psych: affect and mood normal  : pelvic exam demonstrates atrophic mucosa; well-healed anterior vaginal wall incision; no erythema or induration noted near distal aspect of this incision; possible area of remnant distal urethra noted    Outside and Past Medical records:  Review of the result(s) of each unique test - CT, pathology, BMP, CBC    ASSESSMENT and PLAN  70 year old female with stage [id number] bladder cancer s/p radical cystectomy and ileal conduit 6/1/2021. Pathology reviewed today. She did have report of CIS in the lorrie-urethral ducts at the urethral margin. Unclear clinical significance of this given extent of urethral resection. That said, in order to ensure complete resection, will plan vaginal biopsy of distal aspect of anterior vaginal incision and " surrounding mucosa, with resection of any remnant urethra. Risks of this discussed, including bleeding and infection. Post-op recovery expectations discussed.  Regarding fluid collection in pelvis on CT, suspect this is lymphocele. Given patient is asymptomatic, will plan observation at this time and will monitor on subsequent imaging. Otherwise, she has no evidence of disease recurrence, and is overall doing well.     - Schedule vaginal wall biopsy    35 minutes spent on the date of the encounter doing chart review, history and exam, documentation and further activities as noted above.    Leonardo Robles MD  Urology  [facility]

## 2021-09-21 NOTE — TELEPHONE ENCOUNTER
Patient is scheduled for surgery with Dr. Robles     Spoke with: Leola GALVAN spoke with patient in clinic face to face on 09/21/21.    Date of Surgery: Wednesday 10/06/21    Location: [facility]    Informed patient they will need an adult  Yes    Pre op with Provider hilario    H&P: Scheduled with Patient will call and schedule pre-op with her PCP Phani Tapia    Pre-procedure COVID-19 Test: Monday 10/04/21 at [facility]     Additional imaging/appointments: 1 week post op video visit with Dr. Robles Tuesday 10/12/21.    Surgery packet: Leola GALVAN gave to patient in clinic 09/21/21     Additional comments: na

## 2021-09-22 ENCOUNTER — PRE VISIT (OUTPATIENT)
Dept: UROLOGY | Facility: CLINIC | Age: 71
End: 2021-09-22

## 2021-09-22 NOTE — TELEPHONE ENCOUNTER
Reason for visit: Post op follow up     Relevant information:   - Hx of bladder cancer  - Vaginal biopsy on 10/6    Records/imaging/labs/orders: All records available    Pt called: N/A    At Rooming: Video

## 2021-09-22 NOTE — TELEPHONE ENCOUNTER
Chart reviewed pt scheduled for 10/2021, this appt is too soon per Dr. Smith's recommendations.    Called and spoke with pt. She verbalized understanding regarding Return Appt with Dr. Smith should be 2/2022 not 10/2021.  Rescheduled appt.  Video Visit per Pt request.  Pt did have Labs done 09/07/21.    Encouraged to MyChart /call if any further questions or concerns.    Danika Scott LPN

## 2021-09-27 ENCOUNTER — TELEPHONE (OUTPATIENT)
Dept: UROLOGY | Facility: CLINIC | Age: 71
End: 2021-09-27

## 2021-09-27 NOTE — TELEPHONE ENCOUNTER
[facility] Call Center    Phone Message    May a detailed message be left on voicemail: yes     Reason for Call: Other: Michaela called asking about the start and arrival time for her procedure on 10/06. Please call patient back to discuss.     Action Taken: Message routed to:  [facility] ([facility] Uro    Travel Screening: Not Applicable

## 2021-09-28 ENCOUNTER — ONCOLOGY VISIT (OUTPATIENT)
Dept: ONCOLOGY | Facility: CLINIC | Age: 71
End: 2021-09-28
Payer: COMMERCIAL

## 2021-09-28 VITALS
RESPIRATION RATE: 12 BRPM | OXYGEN SATURATION: 98 % | HEART RATE: 85 BPM | WEIGHT: 136.56 LBS | HEIGHT: 66 IN | DIASTOLIC BLOOD PRESSURE: 58 MMHG | TEMPERATURE: 97.3 F | BODY MASS INDEX: 21.95 KG/M2 | SYSTOLIC BLOOD PRESSURE: 106 MMHG

## 2021-09-28 DIAGNOSIS — N18.31 STAGE 3A CHRONIC KIDNEY DISEASE (H): ICD-10-CM

## 2021-09-28 DIAGNOSIS — C50.412 MALIGNANT NEOPLASM OF UPPER-OUTER QUADRANT OF LEFT BREAST IN FEMALE, ESTROGEN RECEPTOR NEGATIVE (H): ICD-10-CM

## 2021-09-28 DIAGNOSIS — K59.00 CONSTIPATION, UNSPECIFIED CONSTIPATION TYPE: ICD-10-CM

## 2021-09-28 DIAGNOSIS — Z17.1 MALIGNANT NEOPLASM OF UPPER-OUTER QUADRANT OF LEFT BREAST IN FEMALE, ESTROGEN RECEPTOR NEGATIVE (H): ICD-10-CM

## 2021-09-28 DIAGNOSIS — C67.9 UROTHELIAL CARCINOMA OF BLADDER WITH INVASION OF MUSCLE (H): Primary | ICD-10-CM

## 2021-09-28 DIAGNOSIS — R93.89 IMAGING ABNORMALITIES: ICD-10-CM

## 2021-09-28 PROCEDURE — 99214 OFFICE O/P EST MOD 30 MIN: CPT | Performed by: NURSE PRACTITIONER

## 2021-09-28 ASSESSMENT — MIFFLIN-ST. JEOR: SCORE: 1151.19

## 2021-09-28 ASSESSMENT — PAIN SCALES - GENERAL: PAINLEVEL: NO PAIN (0)

## 2021-09-28 NOTE — LETTER
9/28/2021         RE: Michaela Dorman  [address]  [place] [address]        Dear Colleague,    Thank you for referring your patient, Michaela Dorman, to the [facility]. Please see a copy of my visit note below.    Hematology/Oncology Visit    Care Team:  - Oncologist: Dr. Ferguson  - PCP: Phani Jason PA-C    Reason for visit: 2 month follow-up for bladder cancer    Diagnosis: left breast cancer and urothelial carcinoma of bladder    Cancer and Treatment Hx:  Sept 2016: presented with left axillary mass, excisional LN biopsy showed metastatic high-grade poorly differentiated carcinoma. MRI breast showed multicentric left sided breast cancer that involved the left lateral breast from the nipple to chest wall. PET without distant mets.  Oct-Nov 2016: completed 4 cycles of neoadjuvant AC then 4 cycles of taxol + herceptin  Feb 8, 2017: bilateral mastectomy for stage 2 [place]-, HER2+ left breast cancer. Complete pathologic response with only a 7mm are of DCIS.  May 2017: completed radiation to left chest wall and axilla with Dr. Gonzáles at [email or web address]  Dec 2017: completed 1 year of herceptin      Oct 2020: presented with frequent culture negative UTIs and pelvic cramping, sent to a urologist  Feb 2021: cystoscopy with biopsy of bladder dome revealed high grade urothelial cancer without muscle invasion  March 2021: Bladder, left trigone, tumor, trans urethral resection of bladder tumor (TURBT) revealed invasive high-grade urothelial carcinoma with extensive invasion of muscularis propria  April 2021: PET/CT showed likely residual tumor in the left trigone, neoadjuvant chemo with cisplatin split dose then developed TAMIR so treatment plan changed to carboplatin + gemcitabine  June 1, 2021: radical cystectomy and ileal conduit. Pathology revealed invasive high-grade urothelial carcinoma with invasion of the muscularis propria. 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 Negative margins. Lymph nodes negative for malignancy. [id number]. Dr. Ferguson recommended against adjuvant chemotherapy.  Aug 2021: second opinion with Urology at [facility] with Dr. Hernandez    Interval History:  Michaela has been feeling well since her last oncology appt. Is getting used to having a ileal conduit and urine collection bag. No clots or blood in urine. Experienced vaginal bleeding/drainage post-op but that has since resolved. No recent fevers or illnesses. No new chest wall masses, unintentional weight loss, lymphadenopathy, or new bone pain. Eating and drinking well but tends to have smaller meals more frequently throughout the day. Ensure daily. No nausea. Constipation for which she takes colace 3 tabs AM and senna 2 tabs PM with 1/3 scoop of MiraLAX daily to achieve soft BM every other day. MiraLAX causes mild abdominal cramping. No other acute complaints. She was supposed to have imaging and appts with [place] tomorrow but cancelled in order to continue follow-up plan through [facility]. We briefly discussed that Dr. Cuellar had offered Nivolumab as a newly available option for bladder cancer which could be discussed in more detailed once Dr. Robles has completed any additional surgical intervention that may be necessary. She felt comfortable with that plan. No other questions or concerns today.    Medications:  Current Outpatient Medications   Medication     acetaminophen (TYLENOL) 500 MG tablet     ALPRAZolam (XANAX) 0.5 MG tablet     aspirin (ASA) 81 MG chewable tablet     atorvastatin (LIPITOR) 20 MG tablet     carvedilol (COREG) 6.25 MG tablet     escitalopram (LEXAPRO) 10 MG tablet     escitalopram (LEXAPRO) 20 MG tablet     LORazepam (ATIVAN) 0.5 MG tablet     meclizine 25 MG CHEW     methocarbamol (ROBAXIN) 750 MG tablet     omeprazole (PRILOSEC) 20 MG DR capsule     ondansetron (ZOFRAN-ODT) 4 MG ODT tab     oxyCODONE-acetaminophen (PERCOCET) 5-325 MG tablet  "    polyethylene glycol (MIRALAX/GLYCOLAX) powder     prochlorperazine (COMPAZINE) 10 MG tablet     SENNA PLUS 8.6-50 MG tablet     No current facility-administered medications for this visit.     Physical Exam:   GEN: pleasantly conversant female no acute distress  SKIN: generally intact, no visible rash, bruising, or sores   ENT: eyes non-icteric, no notable oral sores  LYMPH: no notable cervical, supraclavicular, or axillary lymphadenopathy  BREAST: deferred as completed at last oncology appt with Dr. Ferguson  RESP: clear to auscultation bilaterally, on room air  CARDS: regular rate and rhythm, no notable murmurs   GI: abd soft without notable hepatosplenomegaly, non-tender to palpation, ostomy RLQ pink and budded with clear yellow output into bag  MSK: no notable lower extremity edema  NEURO: alert and oriented without obvious focal deficit    /58 (BP Location: Right arm, Patient Position: Sitting, Cuff Size: Adult Regular)   Pulse 85   Temp 97.3  F (36.3  C) (Temporal)   Resp 12   Ht 1.676 m (5' 6\")   Wt 61.9 kg (136 lb 9 oz)   SpO2 98%   BMI 22.04 kg/m       Wt Readings from Last 4 Encounters:   09/28/21 61.9 kg (136 lb 9 oz)   09/21/21 59.9 kg (132 lb)   07/15/21 61.9 kg (136 lb 6.4 oz)   06/06/21 62.3 kg (137 lb 5.6 oz)     Labs:  No results found for any visits on 09/28/21.    Imaging:  Reviewed CT C/A/P from 9/21/21:  IMPRESSION: In this patient with a history of bladder cancer who is  now status post cystectomy and right lower quadrant ileal conduit  creation,  1. No convincing findings to suggest metastatic disease in the chest  abdomen or pelvis. Somewhat limited evaluation of the left mid and  distal ureter due to lack of intervening fat planes and metallic  artifact from patient's posterior instrumented fusion hardware.  Recommend that 7 to 10 minute delayed phase images also be obtained at  the time of interval follow-up.  2. Interval increase in size of circumscribed low-density " fluid  collection located within the anterior low pelvis measuring up to 5.3  cm (previously measuring 2.4 cm). Collection appears to be located  within or adjacent to the sigmoid colonic wall and may represent a  intramural fluid collection versus an adjacent lymphocele associated  with mass effect and intraluminal narrowing of the sigmoid colon.  Inflammatory changes extend anterior from this collection towards the  anterior abdominal wall, possibly near the level of prior port placement.  3. Mild mural wall thickening and mucosal enhancement of the  rectosigmoid colon, which appears to be located distal to the  above-mentioned fluid collection abutting or adjacent to the sigmoid  colonic wall. Correlate with clinical symptoms of colitis.  4. Moderate atherosclerotic disease particularly at the takeoff of the  superior mesenteric artery suggest clinical correlation for signs of  mesenteric ischemia.  5. Stable esophageal and gastric wall thickening. Recommend  correlation with clinical symptoms.    Reviewed CT urogram w and w/o contrast in care everywhere from 8/4/21:   Result Date: 8/4/2021  Impression: 1. Mild to moderate bilateral hydroureteronephrosis following interval cystectomy with ileal conduit urinary diversion likely postoperative. 2. No CT findings for recurrent or metastatic disease in the abdomen or pelvis. 3. Small loculated fluid collection in the low left anterior pelvis, in the absence of any signs of infection, favor lymphocele over abscess.    Assessment and Plan:  Bladder cancer with invasion of muscle  - Status post neoadjuvant chemo and surgical resection with ileal conduit June 2021  - Recent imaging demonstrated no evidence of residual or metastatic disease  - Dr. Robles in Urology following: to ensure complete resection, a vaginal biopsy of vaginal incision is planned with resection of any remnant urethra. Procedure planned for 10/6.   - Discussed with Dr. Cuellar today, nivolumab is an newly  available option for bladder cancer with no current residual disease. Recommended follow up with Dr. Robles and biopsy then return to Oncology in 1 month to determine if nivolumab would be a good option for her and start plan. Typically continued for 1 year.  - Received 2nd opinion at [facility] with Dr. Hernandez Aug 2021 who recommended returning the end of Sept with MRI pelvis with cystoscopy and possibility of urethrectomy +/- nivolumab. Patient cancelled [facility] plans given planned follow-up with Dr. Robles and Oncology here.  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 Smith in Nephrology  - Fluctuating Cr but stable at 1.02    Incidental findings on CT  - Pelvic fluid consistent with post-op lymphocele, continue to monitor  - No symptomatic or exam findings consistent with colitis or mesenteric ischemia    Constipation  - Discussed utilizing senna and colace BID and discontinuing MiraLAX as it has caused cramping      Future Appointments   Date Time Provider Department Center   9/28/2021 11:00 AM Nadia Islas APRN CNP [facility]   9/30/2021 11:20 AM [facility]   10/4/2021  1:50 PM PH COVID LAB [facility]   10/12/2021  5:00 PM Leonardo Robles MD [facility]   2/7/2022 10:15 AM PH LAB [facility]   2/8/2022  9:30 AM Akiko Smith MD [facility] [place]   The total time of this encounter amounted to 30 minutes today. This time includes face-to-face time spent with the patient, prep work, ordering tests, and performing post-visit documentation.    Nadia Islas CNP        Again, thank you for allowing me to  participate in the care of your patient.        Sincerely,        LISA Damico CNP

## 2021-09-28 NOTE — PROGRESS NOTES
Hematology/Oncology Visit    Care Team:  - Oncologist: Dr. Ferguson  - PCP: Phani Jason PA-C    Reason for visit: 2 month follow-up for bladder cancer    Diagnosis: left breast cancer and urothelial carcinoma of bladder    Cancer and Treatment Hx:  Sept 2016: presented with left axillary mass, excisional LN biopsy showed metastatic high-grade poorly differentiated carcinoma. MRI breast showed multicentric left sided breast cancer that involved the left lateral breast from the nipple to chest wall. PET without distant mets.  Oct-Nov 2016: completed 4 cycles of neoadjuvant AC then 4 cycles of taxol + herceptin  Feb 8, 2017: bilateral mastectomy for stage 2 [place]-, HER2+ left breast cancer. Complete pathologic response with only a 7mm are of DCIS.  May 2017: completed radiation to left chest wall and axilla with Dr. Gonzáles at [facility]  Dec 2017: completed 1 year of herceptin      Oct 2020: presented with frequent culture negative UTIs and pelvic cramping, sent to a urologist  Feb 2021: cystoscopy with biopsy of bladder dome revealed high grade urothelial cancer without muscle invasion  March 2021: Bladder, left trigone, tumor, trans urethral resection of bladder tumor (TURBT) revealed invasive high-grade urothelial carcinoma with extensive invasion of muscularis propria  April 2021: PET/CT showed likely residual tumor in the left trigone, neoadjuvant chemo with cisplatin split dose then developed TAMIR so treatment plan changed to carboplatin + gemcitabine  June 1, 2021: radical cystectomy and ileal conduit. Pathology revealed invasive high-grade urothelial carcinoma with invasion of the muscularis propria. Urothelial carcinoma in situ present in the periurethral ducts at the urethral origin. Negative margins. Lymph nodes negative for malignancy. [id number]. Dr. Ferguson recommended against adjuvant chemotherapy.  Aug 2021: second opinion with Urology at [facility] with Dr. Hernandez    Interval  History:  Michaela has been feeling well since her last oncology appt. Is getting used to having a ileal conduit and urine collection bag. No clots or blood in urine. Experienced vaginal bleeding/drainage post-op but that has since resolved. No recent fevers or illnesses. No new chest wall masses, unintentional weight loss, lymphadenopathy, or new bone pain. Eating and drinking well but tends to have smaller meals more frequently throughout the day. Ensure daily. No nausea. Constipation for which she takes colace 3 tabs AM and senna 2 tabs PM with 1/3 scoop of MiraLAX daily to achieve soft BM every other day. MiraLAX causes mild abdominal cramping. No other acute complaints. She was supposed to have imaging and appts with [place] tomorrow but cancelled in order to continue follow-up plan through [facility]. We briefly discussed that Dr. Cuellar had offered Nivolumab as a newly available option for bladder cancer which could be discussed in more detailed once Dr. Robles has completed any additional surgical intervention that may be necessary. She felt comfortable with that plan. No other questions or concerns today.    Medications:  Current Outpatient Medications   Medication     acetaminophen (TYLENOL) 500 MG tablet     ALPRAZolam (XANAX) 0.5 MG tablet     aspirin (ASA) 81 MG chewable tablet     atorvastatin (LIPITOR) 20 MG tablet     carvedilol (COREG) 6.25 MG tablet     escitalopram (LEXAPRO) 10 MG tablet     escitalopram (LEXAPRO) 20 MG tablet     LORazepam (ATIVAN) 0.5 MG tablet     meclizine 25 MG CHEW     methocarbamol (ROBAXIN) 750 MG tablet     omeprazole (PRILOSEC) 20 MG DR capsule     ondansetron (ZOFRAN-ODT) 4 MG ODT tab     oxyCODONE-acetaminophen (PERCOCET) 5-325 MG tablet     polyethylene glycol (MIRALAX/GLYCOLAX) powder     prochlorperazine (COMPAZINE) 10 MG tablet     SENNA PLUS 8.6-50 MG tablet     No current facility-administered medications for this visit.     Physical Exam:   GEN: pleasantly conversant  "female no acute distress  SKIN: generally intact, no visible rash, bruising, or sores   ENT: eyes non-icteric, no notable oral sores  LYMPH: no notable cervical, supraclavicular, or axillary lymphadenopathy  BREAST: deferred as completed at last oncology appt with Dr. Ferguson  RESP: clear to auscultation bilaterally, on room air  CARDS: regular rate and rhythm, no notable murmurs   GI: abd soft without notable hepatosplenomegaly, non-tender to palpation, ostomy RLQ pink and budded with clear yellow output into bag  MSK: no notable lower extremity edema  NEURO: alert and oriented without obvious focal deficit    /58 (BP Location: Right arm, Patient Position: Sitting, Cuff Size: Adult Regular)   Pulse 85   Temp 97.3  F (36.3  C) (Temporal)   Resp 12   Ht 1.676 m (5' 6\")   Wt 61.9 kg (136 lb 9 oz)   SpO2 98%   BMI 22.04 kg/m       Wt Readings from Last 4 Encounters:   09/28/21 61.9 kg (136 lb 9 oz)   09/21/21 59.9 kg (132 lb)   07/15/21 61.9 kg (136 lb 6.4 oz)   06/06/21 62.3 kg (137 lb 5.6 oz)     Labs:  No results found for any visits on 09/28/21.    Imaging:  Reviewed CT C/A/P from 9/21/21:  IMPRESSION: In this patient with a history of bladder cancer who is  now status post cystectomy and right lower quadrant ileal conduit  creation,  1. No convincing findings to suggest metastatic disease in the chest  abdomen or pelvis. Somewhat limited evaluation of the left mid and  distal ureter due to lack of intervening fat planes and metallic  artifact from patient's posterior instrumented fusion hardware.  Recommend that 7 to 10 minute delayed phase images also be obtained at  the time of interval follow-up.  2. Interval increase in size of circumscribed low-density fluid  collection located within the anterior low pelvis measuring up to 5.3  cm (previously measuring 2.4 cm). Collection appears to be located  within or adjacent to the sigmoid colonic wall and may represent a  intramural fluid collection versus " an adjacent lymphocele associated  with mass effect and intraluminal narrowing of the sigmoid colon.  Inflammatory changes extend anterior from this collection towards the  anterior abdominal wall, possibly near the level of prior port placement.  3. Mild mural wall thickening and mucosal enhancement of the  rectosigmoid colon, which appears to be located distal to the  above-mentioned fluid collection abutting or adjacent to the sigmoid  colonic wall. Correlate with clinical symptoms of colitis.  4. Moderate atherosclerotic disease particularly at the takeoff of the  superior mesenteric artery suggest clinical correlation for signs of  mesenteric ischemia.  5. Stable esophageal and gastric wall thickening. Recommend  correlation with clinical symptoms.    Reviewed CT urogram w and w/o contrast in care everywhere from 8/4/21:   Result Date: 8/4/2021  Impression: 1. Mild to moderate bilateral hydroureteronephrosis following interval cystectomy with ileal conduit urinary diversion likely postoperative. 2. No CT findings for recurrent or metastatic disease in the abdomen or pelvis. 3. Small loculated fluid collection in the low left anterior pelvis, in the absence of any signs of infection, favor lymphocele over abscess.    Assessment and Plan:  Bladder cancer with invasion of muscle  - Status post neoadjuvant chemo and surgical resection with ileal conduit June 2021  - Recent imaging demonstrated no evidence of residual or metastatic disease  - Dr. Robles in Urology following: to ensure complete resection, a vaginal biopsy of vaginal incision is planned with resection of any remnant urethra. Procedure planned for 10/6.   - Discussed with Dr. Cuellar today, nivolumab is an newly available option for bladder cancer with no current residual disease. Recommended follow up with Dr. Robles and biopsy then return to Oncology in 1 month to determine if nivolumab would be a good option for her and start plan. Typically continued  for 1 year.  - Received 2nd opinion at [facility] with Dr. Hernandez Aug 2021 who recommended returning the end of Sept with MRI pelvis with cystoscopy and possibility of urethrectomy +/- nivolumab. Patient cancelled [facility] plans given planned follow-up with Dr. Robles and Oncology here.  - Return to clinic in 1 month to review plan for nivolumab and interval imaging    Invasive ductal carcinoma left breast, ER/[place]-, HER2+  - Status post neoadjuvant chemo, bilateral mastectomy, radiation, and 1 year of herceptin in 2017  - No need for mammograms given bilateral mastectomy  - No current clinical evidence of disease recurrence    CKD stage III  - Followed by Dr. Smith in Nephrology  - Fluctuating Cr but stable at 1.02    Incidental findings on CT  - Pelvic fluid consistent with post-op lymphocele, continue to monitor  - No symptomatic or exam findings consistent with colitis or mesenteric ischemia    Constipation  - Discussed utilizing senna and colace BID and discontinuing MiraLAX as it has caused cramping      Future Appointments   Date Time Provider Department Center   9/28/2021 11:00 AM Nadia Islas APRN CNP [facility]   9/30/2021 11:20 AM [facility]   10/4/2021  1:50 PM PH COVID LAB [facility]   10/12/2021  5:00 PM Leonardo Robles MD [facility]   2/7/2022 10:15 AM PH LAB [facility]   2/8/2022  9:30 AM Akiko Smith MD [facility]   The total time of this encounter amounted to 30 minutes today. This time includes face-to-face time spent with the patient, prep work, ordering tests, and performing post-visit documentation.    Nadia Islas CNP

## 2021-09-28 NOTE — NURSING NOTE
DISCHARGE PLAN:  Next appointments: See patient instruction section  Departure Mode: Ambulatory  Accompanied by: self  5 minutes for nursing discharge (face to face time)     Michaela Dorman is here today for an oncology follow up.  Writing nurse seen patient after Medical Oncology appointment to address questions/concerns/coordinate care.   Appointments scheduled.  See patient instructions and Oncologist's Progress note for further details. Questions and concerns addressed to patient's satisfaction. Patient verbalized and demonstrated understanding of plan.  Contact information provided and patient is encouraged to call with any that arise in the interim of care.    Lashell PRESCOTT [facility]  [phone]  9/28/2021, 11:46 AM

## 2021-09-29 NOTE — PROGRESS NOTES
[address]  [facility] [address]  Phone: [phone]  Primary Provider: Phani Tapia  Pre-op Performing Provider: PHANI TAPIA    PREOPERATIVE EVALUATION:  Today's date: 9/30/2021    Michaela Dorman is a 70 year old female who presents for a preoperative evaluation.    Surgical Information:  Surgery/Procedure: Vaginal Biopsy  Surgery Location: [facility], [place], operated by [facility]  Surgeon: Margaret  Surgery Date: 10/06/21  Time of Surgery: unknown  Where patient plans to recover: At home with family  Fax number for surgical facility: Note does not need to be faxed, will be available electronically in Epic.    Type of Anesthesia Anticipated: Choice    Assessment & Plan     The proposed surgical procedure is considered LOW risk.    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  We note that this is at the time of her visit to me in the clinic and she was feeling well.  Her overall status could certainly change by the time of surgery occurs.  Careful reevaluation the day of surgery is strongly recommended.  - TSH with free T4 reflex; Future  - Parathyroid Hormone Intact; Future  - Comprehensive metabolic panel (BMP + Alb, Alk Phos, ALT, AST, Total. Bili, TP); Future  - CBC with platelets; Future  - **A1C FUTURE 3mo; Future  - CBC with platelets  - Comprehensive metabolic panel (BMP + Alb, Alk Phos, ALT, AST,  Total. Bili, TP)  - Parathyroid Hormone Intact  - TSH with free T4 reflex  - **A1C FUTURE 3mo  - UA with Microscopic reflex to Culture; Future    Vision changes  Parathyroid abnormality (H) - slightly elevated labs  Thirst  Further evaluation for consideration of diabetes  - TSH with free T4 reflex; Future  - Parathyroid Hormone Intact; Future  - Comprehensive metabolic panel (BMP + Alb, Alk Phos, ALT, AST, Total. Bili, TP); Future  - CBC with platelets; Future  - **A1C FUTURE 3mo; Future  - CBC with platelets  - Comprehensive metabolic panel (BMP + Alb, Alk Phos, ALT, AST, Total. Bili, TP)  - Parathyroid Hormone Intact  - TSH with free T4 reflex  - **A1C FUTURE 3mo  - UA with Microscopic reflex to Culture; Future    Abnormal finding of blood chemistry, unspecified   - **A1C FUTURE 3mo; Future  - **A1C FUTURE 3mo  - UA with Microscopic reflex to Culture; Future    Urothelial carcinoma of bladder (H)  - UA with Microscopic reflex to Culture; Future         Risks and Recommendations:  The patient has the following additional risks and recommendations for perioperative complications:   - No identified additional risk factors other than previously addressed    Medication Instructions:  Patient is to take all scheduled medications on the day of surgery    RECOMMENDATION:  APPROVAL GIVEN to proceed with proposed procedure, without further diagnostic evaluation.      Subjective     HPI related to upcoming procedure: Surgical intervention related to potential spread of cancer from the urinary bladder.      Health Care Directive:  Patient does not have a Health Care Directive or Living Will:     Preoperative Review of :   reviewed - controlled substances reflected in medication list.    Review of Systems  CONSTITUTIONAL: NEGATIVE for fever, chills, change in weight  INTEGUMENTARY/SKIN: NEGATIVE for worrisome rashes, moles or lesions  EYES: NEGATIVE for vision changes or irritation  ENT/MOUTH: NEGATIVE for ear, mouth  and throat problems  RESP: NEGATIVE for significant cough or SOB  CV: NEGATIVE for chest pain, palpitations or peripheral edema  GI: NEGATIVE for nausea, abdominal pain, heartburn, or change in bowel habits  : NEGATIVE for frequency, dysuria, or hematuria  MUSCULOSKELETAL: NEGATIVE for significant arthralgias or myalgia  NEURO: NEGATIVE for weakness, dizziness or paresthesias  ENDOCRINE: NEGATIVE for temperature intolerance, skin/hair changes  HEME: NEGATIVE for bleeding problems  PSYCHIATRIC: NEGATIVE for changes in mood or affect      Past Medical History:   Diagnosis Date     Acute kidney injury (H)      Carotid stenosis, bilateral L80%, R40% 09/02/2020     Central stenosis of spinal canal 12/01/2017    lumbar      DDD (degenerative disc disease), lumbar 12/01/2017     Depressive disorder, not elsewhere classified      Esophageal reflux      ETOH abuse     in remission     Foraminal stenosis of lumbar region 12/01/2017    Complete lumbar spine left at various degrees and to a lesser extent the right as well.     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     Subdural hematoma (H)      Urothelial carcinoma (H)      Past Surgical History:   Procedure Laterality Date     ARTHROPLASTY SHOULDER Right 11/17/2015     ARTHROSCOPY KNEE  6/7/2012    Procedure:ARTHROSCOPY KNEE; right knee arthroscopy with partial lateral menisectomy; Surgeon:DAMIÁN MCALLISTER; Location:[facility]     C LIGATE FALLOPIAN TUBE       COLONOSCOPY N/A 12/22/2017    Procedure: COLONOSCOPY;  colonoscopy;  Surgeon: Chuck Chand MD;  Location: [facility]     CYSTECTOMY BLADDER RADICAL, ILEAL DIVERSION, COMBINED N/A 6/1/2021    Procedure: RADICAL CYSTECTOMY  WITH ILEAL CONDUIT CREATION,BILATERAL PELVIC LYMPHADENECTOMY;  Surgeon: Leonardo Robles  MD Mohan;  Location: [facility]     CYSTOSCOPY, RETROGRADES, COMBINED Bilateral 3/18/2021    Procedure: CYSTOSCOPY, WITH RETROGRADE PYELOGRAM;  Surgeon: Girish Jack MD;  Location: [facility]     CYSTOSCOPY, TRANSURETHRAL RESECTION (TUR) TUMOR BLADDER, COMBINED N/A 3/18/2021    Procedure: CYSTOSCOPY, WITH TRANSURETHRAL RESECTION BLADDER TUMOR;  Surgeon: Girish Jack MD;  Location: [facility]     ENDARTERECTOMY CAROTID Left 10/8/2020    Procedure: LEFT CAROTID ENDARTERECTOMY WITH EEG;  Surgeon: Lacho Jasmine MD;  Location: [facility]     EXCISE MASS AXILLA Left 9/16/2016    Procedure: EXCISE MASS AXILLA;  Surgeon: Keyon Blanco MD;  Location: [facility]     HC REMV CATARACT EXTRACAP,INSERT LENS, W/O ECP  6/25/2009    Right eye     INJECT EPIDURAL LUMBAR N/A 4/11/2019    Procedure: interlaminar epidual steroid injection lumbar 4-5;  Surgeon: Oskar Salvador MD;  Location: [facility]     INJECT EPIDURAL LUMBAR Bilateral 9/12/2019    Procedure: lumbar 4-5 epidural interlaminar steroid injection;  Surgeon: Oskar Salvador MD;  Location: [facility]     INSERT PORT VASCULAR ACCESS Right 10/7/2016    Procedure: INSERT PORT VASCULAR ACCESS;  Surgeon: Keyon Blanco MD;  Location: [facility]     IR CHEST PORT PLACEMENT > 5 YRS OF AGE  4/16/2021     MASTECTOMY SIMPLE BILATERAL Bilateral 2/8/2017    Procedure: MASTECTOMY SIMPLE BILATERAL;  Surgeon: Keyon Blanco MD;  Location: [facility]     OPEN REDUCTION INTERNAL FIXATION FOOT Right 3/20/2015    Procedure: OPEN REDUCTION INTERNAL FIXATION FOOT;  Surgeon: Javi Herrmann DPM;  Location: [facility]     OPTICAL TRACKING SYSTEM FUSION SPINE POSTERIOR LUMBAR TWO LEVELS N/A 2/4/2020    Procedure: LUMBAR 2-SACRAL1 TRANSFORMINAL INTERBODY FUSION;  Surgeon: Lenny Gomes MD;  Location: [facility]     REMOVE PORT VASCULAR ACCESS Right 2/14/2018    Procedure: REMOVE PORT VASCULAR ACCESS;  right intra jugular port removal;  Surgeon: Keyon Blanco MD;  Location: [facility]     SHOULDER SURGERY Right 11/2015      ZZC NONSPECIFIC PROCEDURE  1956    ankle fracture surgery     Current Outpatient Medications   Medication Sig Dispense Refill     acetaminophen (TYLENOL) 500 MG tablet Take 1,000 mg by mouth 3 times daily as needed for mild pain (500MG X 2 = 1,000MG)       ALPRAZolam (XANAX) 0.5 MG tablet TAKE 1 TABLET BY MOUTH DAILY AT BEDTIME AS NEEDED FOR SLEEP 90 tablet 0     aspirin (ASA) 81 MG chewable tablet Take 1 tablet (81 mg) by mouth daily 100 tablet 3     atorvastatin (LIPITOR) 20 MG tablet TAKE 1 TABLET BY MOUTH EVERY DAY 90 tablet 1     carvedilol (COREG) 6.25 MG tablet Take 1 tablet (6.25 mg) by mouth 2 times daily (with meals) 60 tablet 11     escitalopram (LEXAPRO) 10 MG tablet TAKE 1 TABLET BY MOUTH EVERY DAY WITH 20MG TABLETS (Patient taking differently: Take 10 mg by mouth daily Takes with 20 mg tablet for total daily dose of 30 mg in the morning.) 90 tablet 1     escitalopram (LEXAPRO) 20 MG tablet TAKE 1 TABLET BY MOUTH EVERY DAY WITH 10MG TABLETS 90 tablet 1     LORazepam (ATIVAN) 0.5 MG tablet Take 1 tablet (0.5 mg) by mouth every 4 hours as needed (Anxiety, Nausea/Vomiting or Sleep) 30 tablet 3     meclizine 25 MG CHEW Take 25 mg by mouth every 6 hours as needed for dizziness       methocarbamol (ROBAXIN) 750 MG tablet Take 1 tablet (750 mg) by mouth 4 times daily as needed for muscle spasms (or abdominal pain) 30 tablet 0     omeprazole (PRILOSEC) 20 MG DR capsule Take 20 mg by mouth daily       ondansetron (ZOFRAN-ODT) 4 MG ODT tab Take 1 tablet (4 mg) by mouth every 8 hours as needed for nausea 30 tablet 0     oxyCODONE-acetaminophen (PERCOCET) 5-325 MG tablet Take 0.5-1 tablets by mouth every 6 hours as needed for severe pain 30 tablet 0     polyethylene glycol (MIRALAX/GLYCOLAX) powder Take 17 g by mouth every morning        prochlorperazine (COMPAZINE) 10 MG tablet TAKE 0.5 TABLETS (5 MG) BY MOUTH EVERY 6 HOURS AS NEEDED (NAUSEA/VOMITING) 30 tablet 3     SENNA PLUS 8.6-50 MG tablet TAKE 2 TABLETS  "BY MOUTH DAILY AT BEDTIME 100 tablet 0       Allergies   Allergen Reactions     Oxybutynin      Patient reports symptoms of nausea and mind fogginess        Social History     Tobacco Use     Smoking status: Former Smoker     Packs/day: 3.00     Years: 10.00     Pack years: 30.00     Types: Cigarettes     Quit date: 1979     Years since quittin.8     Smokeless tobacco: Never Used     Tobacco comment: approx. 3 packs daily   Substance Use Topics     Alcohol use: Yes     Alcohol/week: 0.0 standard drinks     Comment: daily glass of wine     Family History   Problem Relation Age of Onset     Hypertension Mother      Thyroid Disease Mother      Cerebrovascular Disease Mother      Hypertension Father      Cerebrovascular Disease Father      Cancer Father         skin     Thyroid Disease Sister      Diabetes Brother         type 2     Depression Sister      Breast Cancer Sister         age 47     Cancer Sister         skin     Cancer Brother         inside nose     History   Drug Use No         Objective     /62   Pulse 71   Temp 97.6  F (36.4  C) (Temporal)   Resp 20   Ht 1.676 m (5' 6\")   Wt 61.2 kg (135 lb)   SpO2 96%   BMI 21.79 kg/m      Physical Exam    GENERAL APPEARANCE: healthy, alert and no distress     EYES: EOMI,  PERRL     HENT: ear canals and TM's normal and nose and mouth without ulcers or lesions     NECK: no adenopathy, no asymmetry, masses, or scars and thyroid normal to palpation     RESP: lungs clear to auscultation - no rales, rhonchi or wheezes     CV: regular rates and rhythm, normal S1 S2, no S3 or S4 and no murmur, click or rub     ABDOMEN:  soft, nontender, no HSM or masses and bowel sounds normal     MS: extremities normal- no gross deformities noted, no evidence of inflammation in joints, FROM in all extremities.     SKIN: no suspicious lesions or rashes     NEURO: Normal strength and tone, sensory exam grossly normal, mentation intact and speech normal     PSYCH: " mentation appears normal. and affect normal/bright     LYMPHATICS: No cervical adenopathy    Recent Labs   Lab Test 09/21/21  1253 09/07/21  1034 08/09/21  1001 08/09/21  1001 07/14/21  0824 07/14/21  0824 04/20/21  0805 04/16/21  1149 11/20/20  1151 10/08/20  0606 10/01/20  1139 10/01/20  1139   HGB 12.0  --   --  11.0*   < > 10.0*   < >  --    < >  --   --  11.3*     --   --   --   --  305   < >  --    < >  --   --  269   INR  --   --   --   --   --   --   --  0.91  --   --   --  0.9    137   < > 137   < > 138   < >  --    < > 139   < > 137   POTASSIUM 4.8 4.9   < > 5.2   < > 5.2   < >  --    < > 3.5   < > 5.1   CR 1.02 1.02   < > 1.25*   < > 1.03   < >  --    < > 0.89   < > 1.13*   A1C  --   --   --   --   --   --   --   --   --  5.2  --   --     < > = values in this interval not displayed.        Diagnostics:  Labs pending at this time.  Results will be reviewed when available.   No EKG required, no history of coronary heart disease, significant arrhythmia, peripheral arterial disease or other structural heart disease.    Revised Cardiac Risk Index (RCRI):  The patient has the following serious cardiovascular risks for perioperative complications:   - No serious cardiac risks = 0 points     RCRI Interpretation: 1 point: Class II (low risk - 0.9% complication rate)           Signed Electronically by: Phani Jason PA-C  Copy of this evaluation report is provided to requesting physician.

## 2021-09-29 NOTE — H&P (VIEW-ONLY)
[address]  [facility] [address]  Phone: [phone]  Primary Provider: Phani Tapia  Pre-op Performing Provider: PHANI TAPIA    PREOPERATIVE EVALUATION:  Today's date: 9/30/2021    Michaela Dorman is a 70 year old female who presents for a preoperative evaluation.    Surgical Information:  Surgery/Procedure: Vaginal Biopsy  Surgery Location: [facility]  Surgeon: Margaret  Surgery Date: 10/06/21  Time of Surgery: unknown  Where patient plans to recover: At home with family  Fax number for surgical facility: Note does not need to be faxed, will be available electronically in Epic.    Type of Anesthesia Anticipated: Choice    Assessment & Plan     The proposed surgical procedure is considered LOW risk.    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  We note that this is at the time of her visit to me in the clinic and she was feeling well.  Her overall status could certainly change by the time of surgery occurs.  Careful reevaluation the day of surgery is strongly recommended.  - TSH with free T4 reflex; Future  - Parathyroid Hormone Intact; Future  - Comprehensive metabolic panel (BMP + Alb, Alk Phos, ALT, AST, Total. Bili, TP); Future  - CBC with platelets; Future  - **A1C FUTURE 3mo; Future  - CBC with platelets  - Comprehensive metabolic panel (BMP + Alb, Alk Phos, ALT, AST,  Total. Bili, TP)  - Parathyroid Hormone Intact  - TSH with free T4 reflex  - **A1C FUTURE 3mo  - UA with Microscopic reflex to Culture; Future    Vision changes  Parathyroid abnormality (H) - slightly elevated labs  Thirst  Further evaluation for consideration of diabetes  - TSH with free T4 reflex; Future  - Parathyroid Hormone Intact; Future  - Comprehensive metabolic panel (BMP + Alb, Alk Phos, ALT, AST, Total. Bili, TP); Future  - CBC with platelets; Future  - **A1C FUTURE 3mo; Future  - CBC with platelets  - Comprehensive metabolic panel (BMP + Alb, Alk Phos, ALT, AST, Total. Bili, TP)  - Parathyroid Hormone Intact  - TSH with free T4 reflex  - **A1C FUTURE 3mo  - UA with Microscopic reflex to Culture; Future    Abnormal finding of blood chemistry, unspecified   - **A1C FUTURE 3mo; Future  - **A1C FUTURE 3mo  - UA with Microscopic reflex to Culture; Future    Urothelial carcinoma of bladder (H)  - UA with Microscopic reflex to Culture; Future         Risks and Recommendations:  The patient has the following additional risks and recommendations for perioperative complications:   - No identified additional risk factors other than previously addressed    Medication Instructions:  Patient is to take all scheduled medications on the day of surgery    RECOMMENDATION:  APPROVAL GIVEN to proceed with proposed procedure, without further diagnostic evaluation.      Subjective     HPI related to upcoming procedure: Surgical intervention related to potential spread of cancer from the urinary bladder.      Health Care Directive:  Patient does not have a Health Care Directive or Living Will:     Preoperative Review of :   reviewed - controlled substances reflected in medication list.    Review of Systems  CONSTITUTIONAL: NEGATIVE for fever, chills, change in weight  INTEGUMENTARY/SKIN: NEGATIVE for worrisome rashes, moles or lesions  EYES: NEGATIVE for vision changes or irritation  ENT/MOUTH: NEGATIVE for ear, mouth  and throat problems  RESP: NEGATIVE for significant cough or SOB  CV: NEGATIVE for chest pain, palpitations or peripheral edema  GI: NEGATIVE for nausea, abdominal pain, heartburn, or change in bowel habits  : NEGATIVE for frequency, dysuria, or hematuria  MUSCULOSKELETAL: NEGATIVE for significant arthralgias or myalgia  NEURO: NEGATIVE for weakness, dizziness or paresthesias  ENDOCRINE: NEGATIVE for temperature intolerance, skin/hair changes  HEME: NEGATIVE for bleeding problems  PSYCHIATRIC: NEGATIVE for changes in mood or affect      Past Medical History:   Diagnosis Date     Acute kidney injury (H)      Carotid stenosis, bilateral L80%, R40% 09/02/2020     Central stenosis of spinal canal 12/01/2017    lumbar      DDD (degenerative disc disease), lumbar 12/01/2017     Depressive disorder, not elsewhere classified      Esophageal reflux      ETOH abuse     in remission     Foraminal stenosis of lumbar region 12/01/2017    Complete lumbar spine left at various degrees and to a lesser extent the right as well.     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     Subdural hematoma (H)      Urothelial carcinoma (H)      Past Surgical History:   Procedure Laterality Date     ARTHROPLASTY SHOULDER Right 11/17/2015     ARTHROSCOPY KNEE  6/7/2012    Procedure:ARTHROSCOPY KNEE; right knee arthroscopy with partial lateral menisectomy; Surgeon:DAMIÁN MCALLISTER; Location:[facility]     C LIGATE FALLOPIAN TUBE       COLONOSCOPY N/A 12/22/2017    Procedure: COLONOSCOPY;  colonoscopy;  Surgeon: Chuck Chand MD;  [facility]     CYSTECTOMY BLADDER RADICAL, ILEAL DIVERSION, COMBINED N/A 6/1/2021    Procedure: RADICAL CYSTECTOMY  WITH ILEAL CONDUIT CREATION,BILATERAL PELVIC LYMPHADENECTOMY;  Surgeon: Leonardo Robles  MD Mohan;  [facility]     CYSTOSCOPY, RETROGRADES, COMBINED Bilateral 3/18/2021    Procedure: CYSTOSCOPY, WITH RETROGRADE PYELOGRAM;  Surgeon: Girish Jack MD;  [facility]: [facility]     CYSTOSCOPY, TRANSURETHRAL RESECTION (TUR) TUMOR BLADDER, COMBINED N/A 3/18/2021    Procedure: CYSTOSCOPY, WITH TRANSURETHRAL RESECTION BLADDER TUMOR;  Surgeon: Girish Jack MD;  [facility]: [facility]     ENDARTERECTOMY CAROTID Left 10/8/2020    Procedure: LEFT CAROTID ENDARTERECTOMY WITH EEG;  Surgeon: Lacho Jasmine MD;  Location: [facility]     EXCISE MASS AXILLA Left 9/16/2016    Procedure: EXCISE MASS AXILLA;  Surgeon: Keyon Blanco MD;  [facility]: [facility]     HC REMV CATARACT EXTRACAP,INSERT LENS, W/O ECP  6/25/2009    Right eye     INJECT EPIDURAL LUMBAR N/A 4/11/2019    Procedure: interlaminar epidual steroid injection lumbar 4-5;  Surgeon: Oskar Salvador MD;  [facility]     INJECT EPIDURAL LUMBAR Bilateral 9/12/2019    Procedure: lumbar 4-5 epidural interlaminar steroid injection;  Surgeon: Oskar Salvador MD;  [facility]     INSERT PORT VASCULAR ACCESS Right 10/7/2016    Procedure: INSERT PORT VASCULAR ACCESS;  Surgeon: Keyon Blanco MD;  [facility]     IR CHEST PORT PLACEMENT > 5 YRS OF AGE  4/16/2021     MASTECTOMY SIMPLE BILATERAL Bilateral 2/8/2017    Procedure: MASTECTOMY SIMPLE BILATERAL;  Surgeon: Keyon Blanco MD;  [facility]     OPEN REDUCTION INTERNAL FIXATION FOOT Right 3/20/2015    Procedure: OPEN REDUCTION INTERNAL FIXATION FOOT;  Surgeon: Javi Herrmann DPM;  [facility]     OPTICAL TRACKING SYSTEM FUSION SPINE POSTERIOR LUMBAR TWO LEVELS N/A 2/4/2020    Procedure: LUMBAR 2-SACRAL1 TRANSFORMINAL INTERBODY FUSION;  Surgeon: Lenny Gomes MD;  [facility]: [facility]     REMOVE PORT VASCULAR ACCESS Right 2/14/2018    Procedure: REMOVE PORT VASCULAR ACCESS;  right intra jugular port removal;  Surgeon: Keyon Blanco MD;  [facility]     SHOULDER SURGERY Right 11/2015      ZZC NONSPECIFIC PROCEDURE  1956    ankle fracture surgery     Current Outpatient Medications   Medication Sig Dispense Refill     acetaminophen (TYLENOL) 500 MG tablet Take 1,000 mg by mouth 3 times daily as needed for mild pain (500MG X 2 = 1,000MG)       ALPRAZolam (XANAX) 0.5 MG tablet TAKE 1 TABLET BY MOUTH DAILY AT BEDTIME AS NEEDED FOR SLEEP 90 tablet 0     aspirin (ASA) 81 MG chewable tablet Take 1 tablet (81 mg) by mouth daily 100 tablet 3     atorvastatin (LIPITOR) 20 MG tablet TAKE 1 TABLET BY MOUTH EVERY DAY 90 tablet 1     carvedilol (COREG) 6.25 MG tablet Take 1 tablet (6.25 mg) by mouth 2 times daily (with meals) 60 tablet 11     escitalopram (LEXAPRO) 10 MG tablet TAKE 1 TABLET BY MOUTH EVERY DAY WITH 20MG TABLETS (Patient taking differently: Take 10 mg by mouth daily Takes with 20 mg tablet for total daily dose of 30 mg in the morning.) 90 tablet 1     escitalopram (LEXAPRO) 20 MG tablet TAKE 1 TABLET BY MOUTH EVERY DAY WITH 10MG TABLETS 90 tablet 1     LORazepam (ATIVAN) 0.5 MG tablet Take 1 tablet (0.5 mg) by mouth every 4 hours as needed (Anxiety, Nausea/Vomiting or Sleep) 30 tablet 3     meclizine 25 MG CHEW Take 25 mg by mouth every 6 hours as needed for dizziness       methocarbamol (ROBAXIN) 750 MG tablet Take 1 tablet (750 mg) by mouth 4 times daily as needed for muscle spasms (or abdominal pain) 30 tablet 0     omeprazole (PRILOSEC) 20 MG DR capsule Take 20 mg by mouth daily       ondansetron (ZOFRAN-ODT) 4 MG ODT tab Take 1 tablet (4 mg) by mouth every 8 hours as needed for nausea 30 tablet 0     oxyCODONE-acetaminophen (PERCOCET) 5-325 MG tablet Take 0.5-1 tablets by mouth every 6 hours as needed for severe pain 30 tablet 0     polyethylene glycol (MIRALAX/GLYCOLAX) powder Take 17 g by mouth every morning        prochlorperazine (COMPAZINE) 10 MG tablet TAKE 0.5 TABLETS (5 MG) BY MOUTH EVERY 6 HOURS AS NEEDED (NAUSEA/VOMITING) 30 tablet 3     SENNA PLUS 8.6-50 MG tablet TAKE 2 TABLETS  "BY MOUTH DAILY AT BEDTIME 100 tablet 0       Allergies   Allergen Reactions     Oxybutynin      Patient reports symptoms of nausea and mind fogginess        Social History     Tobacco Use     Smoking status: Former Smoker     Packs/day: 3.00     Years: 10.00     Pack years: 30.00     Types: Cigarettes     Quit date: 1979     Years since quittin.8     Smokeless tobacco: Never Used     Tobacco comment: approx. 3 packs daily   Substance Use Topics     Alcohol use: Yes     Alcohol/week: 0.0 standard drinks     Comment: daily glass of wine     Family History   Problem Relation Age of Onset     Hypertension Mother      Thyroid Disease Mother      Cerebrovascular Disease Mother      Hypertension Father      Cerebrovascular Disease Father      Cancer Father         skin     Thyroid Disease Sister      Diabetes Brother         type 2     Depression Sister      Breast Cancer Sister         age 47     Cancer Sister         skin     Cancer Brother         inside nose     History   Drug Use No         Objective     /62   Pulse 71   Temp 97.6  F (36.4  C) (Temporal)   Resp 20   Ht 1.676 m (5' 6\")   Wt 61.2 kg (135 lb)   SpO2 96%   BMI 21.79 kg/m      Physical Exam    GENERAL APPEARANCE: healthy, alert and no distress     EYES: EOMI,  PERRL     HENT: ear canals and TM's normal and nose and mouth without ulcers or lesions     NECK: no adenopathy, no asymmetry, masses, or scars and thyroid normal to palpation     RESP: lungs clear to auscultation - no rales, rhonchi or wheezes     CV: regular rates and rhythm, normal S1 S2, no S3 or S4 and no murmur, click or rub     ABDOMEN:  soft, nontender, no HSM or masses and bowel sounds normal     MS: extremities normal- no gross deformities noted, no evidence of inflammation in joints, FROM in all extremities.     SKIN: no suspicious lesions or rashes     NEURO: Normal strength and tone, sensory exam grossly normal, mentation intact and speech normal     PSYCH: " mentation appears normal. and affect normal/bright     LYMPHATICS: No cervical adenopathy    Recent Labs   Lab Test 09/21/21  1253 09/07/21  1034 08/09/21  1001 08/09/21  1001 07/14/21  0824 07/14/21  0824 04/20/21  0805 04/16/21  1149 11/20/20  1151 10/08/20  0606 10/01/20  1139 10/01/20  1139   HGB 12.0  --   --  11.0*   < > 10.0*   < >  --    < >  --   --  11.3*     --   --   --   --  305   < >  --    < >  --   --  269   INR  --   --   --   --   --   --   --  0.91  --   --   --  0.9    137   < > 137   < > 138   < >  --    < > 139   < > 137   POTASSIUM 4.8 4.9   < > 5.2   < > 5.2   < >  --    < > 3.5   < > 5.1   CR 1.02 1.02   < > 1.25*   < > 1.03   < >  --    < > 0.89   < > 1.13*   A1C  --   --   --   --   --   --   --   --   --  5.2  --   --     < > = values in this interval not displayed.        Diagnostics:  Labs pending at this time.  Results will be reviewed when available.   No EKG required, no history of coronary heart disease, significant arrhythmia, peripheral arterial disease or other structural heart disease.    Revised Cardiac Risk Index (RCRI):  The patient has the following serious cardiovascular risks for perioperative complications:   - No serious cardiac risks = 0 points     RCRI Interpretation: 1 point: Class II (low risk - 0.9% complication rate)           Signed Electronically by: Phani Jason PA-C  Copy of this evaluation report is provided to requesting physician.

## 2021-09-29 NOTE — PATIENT INSTRUCTIONS

## 2021-09-30 ENCOUNTER — OFFICE VISIT (OUTPATIENT)
Dept: FAMILY MEDICINE | Facility: OTHER | Age: 71
End: 2021-09-30
Payer: COMMERCIAL

## 2021-09-30 VITALS
HEART RATE: 71 BPM | OXYGEN SATURATION: 96 % | SYSTOLIC BLOOD PRESSURE: 134 MMHG | WEIGHT: 135 LBS | TEMPERATURE: 97.6 F | DIASTOLIC BLOOD PRESSURE: 62 MMHG | BODY MASS INDEX: 21.69 KG/M2 | RESPIRATION RATE: 20 BRPM | HEIGHT: 66 IN

## 2021-09-30 DIAGNOSIS — D70.1 CHEMOTHERAPY-INDUCED NEUTROPENIA (H): ICD-10-CM

## 2021-09-30 DIAGNOSIS — Z01.818 PREOP GENERAL PHYSICAL EXAM: ICD-10-CM

## 2021-09-30 DIAGNOSIS — C50.412 MALIGNANT NEOPLASM OF UPPER-OUTER QUADRANT OF LEFT FEMALE BREAST, UNSPECIFIED ESTROGEN RECEPTOR STATUS (H): ICD-10-CM

## 2021-09-30 DIAGNOSIS — H53.9 VISION CHANGES: ICD-10-CM

## 2021-09-30 DIAGNOSIS — E21.5 PARATHYROID ABNORMALITY (H): ICD-10-CM

## 2021-09-30 DIAGNOSIS — C67.9 UROTHELIAL CARCINOMA OF BLADDER (H): ICD-10-CM

## 2021-09-30 DIAGNOSIS — N18.30 STAGE 3 CHRONIC KIDNEY DISEASE, UNSPECIFIED WHETHER STAGE 3A OR 3B CKD (H): ICD-10-CM

## 2021-09-30 DIAGNOSIS — R79.9 ABNORMAL FINDING OF BLOOD CHEMISTRY, UNSPECIFIED: ICD-10-CM

## 2021-09-30 DIAGNOSIS — T45.1X5A CHEMOTHERAPY-INDUCED NEUTROPENIA (H): ICD-10-CM

## 2021-09-30 DIAGNOSIS — C77.5 SECONDARY AND UNSPECIFIED MALIGNANT NEOPLASM OF INTRAPELVIC LYMPH NODES (H): ICD-10-CM

## 2021-09-30 DIAGNOSIS — C67.9 UROTHELIAL CARCINOMA OF BLADDER WITH INVASION OF MUSCLE (H): ICD-10-CM

## 2021-09-30 DIAGNOSIS — I65.23 CAROTID ARTERY PLAQUE, BILATERAL: ICD-10-CM

## 2021-09-30 DIAGNOSIS — C67.8 MALIGNANT NEOPLASM OF OVERLAPPING SITES OF BLADDER (H): ICD-10-CM

## 2021-09-30 DIAGNOSIS — Z23 NEED FOR VACCINATION: ICD-10-CM

## 2021-09-30 DIAGNOSIS — Z13.220 SCREENING FOR HYPERLIPIDEMIA: Primary | ICD-10-CM

## 2021-09-30 DIAGNOSIS — I10 HYPERTENSION GOAL BP (BLOOD PRESSURE) < 140/90: ICD-10-CM

## 2021-09-30 LAB
ALBUMIN SERPL-MCNC: 4 G/DL (ref 3.4–5)
ALP SERPL-CCNC: 58 U/L (ref 40–150)
ALT SERPL W P-5'-P-CCNC: 25 U/L (ref 0–50)
ANION GAP SERPL CALCULATED.3IONS-SCNC: 4 MMOL/L (ref 3–14)
AST SERPL W P-5'-P-CCNC: 19 U/L (ref 0–45)
BILIRUB SERPL-MCNC: 0.2 MG/DL (ref 0.2–1.3)
BUN SERPL-MCNC: 26 MG/DL (ref 7–30)
CALCIUM SERPL-MCNC: 9.3 MG/DL (ref 8.5–10.1)
CHLORIDE BLD-SCNC: 109 MMOL/L (ref 94–109)
CO2 SERPL-SCNC: 25 MMOL/L (ref 20–32)
CREAT SERPL-MCNC: 0.91 MG/DL (ref 0.52–1.04)
ERYTHROCYTE [DISTWIDTH] IN BLOOD BY AUTOMATED COUNT: 14.6 % (ref 10–15)
GFR SERPL CREATININE-BSD FRML MDRD: 64 ML/MIN/1.73M2
GLUCOSE BLD-MCNC: 86 MG/DL (ref 70–99)
HBA1C MFR BLD: 5.3 % (ref 0–5.6)
HCT VFR BLD AUTO: 36 % (ref 35–47)
HGB BLD-MCNC: 11.7 G/DL (ref 11.7–15.7)
MCH RBC QN AUTO: 33.1 PG (ref 26.5–33)
MCHC RBC AUTO-ENTMCNC: 32.5 G/DL (ref 31.5–36.5)
MCV RBC AUTO: 102 FL (ref 78–100)
PLATELET # BLD AUTO: 252 10E3/UL (ref 150–450)
POTASSIUM BLD-SCNC: 4.3 MMOL/L (ref 3.4–5.3)
PROT SERPL-MCNC: 7.9 G/DL (ref 6.8–8.8)
PTH-INTACT SERPL-MCNC: 48 PG/ML (ref 18–80)
RBC # BLD AUTO: 3.53 10E6/UL (ref 3.8–5.2)
SODIUM SERPL-SCNC: 138 MMOL/L (ref 133–144)
T4 FREE SERPL-MCNC: 0.48 NG/DL (ref 0.76–1.46)
TSH SERPL DL<=0.005 MIU/L-ACNC: 6.83 MU/L (ref 0.4–4)
WBC # BLD AUTO: 8.7 10E3/UL (ref 4–11)

## 2021-09-30 PROCEDURE — 83036 HEMOGLOBIN GLYCOSYLATED A1C: CPT | Performed by: PHYSICIAN ASSISTANT

## 2021-09-30 PROCEDURE — 83970 ASSAY OF PARATHORMONE: CPT | Performed by: PHYSICIAN ASSISTANT

## 2021-09-30 PROCEDURE — 99214 OFFICE O/P EST MOD 30 MIN: CPT | Performed by: PHYSICIAN ASSISTANT

## 2021-09-30 PROCEDURE — 84439 ASSAY OF FREE THYROXINE: CPT | Performed by: PHYSICIAN ASSISTANT

## 2021-09-30 PROCEDURE — 84443 ASSAY THYROID STIM HORMONE: CPT | Performed by: PHYSICIAN ASSISTANT

## 2021-09-30 PROCEDURE — 85027 COMPLETE CBC AUTOMATED: CPT | Performed by: PHYSICIAN ASSISTANT

## 2021-09-30 PROCEDURE — 36415 COLL VENOUS BLD VENIPUNCTURE: CPT | Performed by: PHYSICIAN ASSISTANT

## 2021-09-30 PROCEDURE — 80053 COMPREHEN METABOLIC PANEL: CPT | Performed by: PHYSICIAN ASSISTANT

## 2021-09-30 ASSESSMENT — PAIN SCALES - GENERAL: PAINLEVEL: NO PAIN (0)

## 2021-09-30 ASSESSMENT — MIFFLIN-ST. JEOR: SCORE: 1144.11

## 2021-10-01 ENCOUNTER — MYC MEDICAL ADVICE (OUTPATIENT)
Dept: FAMILY MEDICINE | Facility: OTHER | Age: 71
End: 2021-10-01

## 2021-10-01 DIAGNOSIS — E03.4 HYPOTHYROIDISM DUE TO ACQUIRED ATROPHY OF THYROID: Primary | ICD-10-CM

## 2021-10-01 RX ORDER — LEVOTHYROXINE SODIUM 25 UG/1
25 TABLET ORAL DAILY
Qty: 90 TABLET | Refills: 1 | Status: SHIPPED | OUTPATIENT
Start: 2021-10-01 | End: 2021-12-27

## 2021-10-04 ENCOUNTER — LAB (OUTPATIENT)
Dept: FAMILY MEDICINE | Facility: CLINIC | Age: 71
End: 2021-10-04
Payer: COMMERCIAL

## 2021-10-04 DIAGNOSIS — Z11.59 ENCOUNTER FOR SCREENING FOR OTHER VIRAL DISEASES: ICD-10-CM

## 2021-10-04 PROCEDURE — U0005 INFEC AGEN DETEC AMPLI PROBE: HCPCS

## 2021-10-04 PROCEDURE — U0003 INFECTIOUS AGENT DETECTION BY NUCLEIC ACID (DNA OR RNA); SEVERE ACUTE RESPIRATORY SYNDROME CORONAVIRUS 2 (SARS-COV-2) (CORONAVIRUS DISEASE [COVID-19]), AMPLIFIED PROBE TECHNIQUE, MAKING USE OF HIGH THROUGHPUT TECHNOLOGIES AS DESCRIBED BY CMS-2020-01-R: HCPCS

## 2021-10-05 ENCOUNTER — ANESTHESIA EVENT (OUTPATIENT)
Dept: SURGERY | Facility: AMBULATORY SURGERY CENTER | Age: 71
End: 2021-10-05
Payer: MEDICARE

## 2021-10-05 LAB — SARS-COV-2 RNA RESP QL NAA+PROBE: NEGATIVE

## 2021-10-05 ASSESSMENT — LIFESTYLE VARIABLES: TOBACCO_USE: 1

## 2021-10-05 NOTE — ANESTHESIA PREPROCEDURE EVALUATION
Anesthesia Pre-Procedure Evaluation    Patient: Michaela Dorman   MRN: [id number] : 1950        Preoperative Diagnosis: Urothelial carcinoma of bladder (H) [C67.9]    Procedure : Procedure(s):  BIOPSY, VAGINA          Past Medical History:   Diagnosis Date     Acute kidney injury (H)      Carotid stenosis, bilateral L80%, R40% 2020     Central stenosis of spinal canal 2017    lumbar      DDD (degenerative disc disease), lumbar 2017     Depressive disorder, not elsewhere classified      Esophageal reflux      ETOH abuse     in remission     Foraminal stenosis of lumbar region 2017    Complete lumbar spine left at various degrees and to a lesser extent the right as well.     Lumbar radiculopathy 2017     Personal history of malignant neoplasm of breast      Prophylactic antibiotic for dental procedure indicated due to prior joint replacement 2017     S/P reverse total shoulder arthroplasty, right 2017     Subdural hematoma (H)      Urothelial carcinoma (H)       Past Surgical History:   Procedure Laterality Date     ARTHROPLASTY SHOULDER Right 2015     ARTHROSCOPY KNEE  2012    Procedure:ARTHROSCOPY KNEE; right knee arthroscopy with partial lateral menisectomy; Surgeon:DAMIÁN MCALLISTER; [facility]     C LIGATE FALLOPIAN TUBE       COLONOSCOPY N/A 2017    Procedure: COLONOSCOPY;  colonoscopy;  Surgeon: Chuck Chand MD;  [facility]     CYSTECTOMY BLADDER RADICAL, ILEAL DIVERSION, COMBINED N/A 2021    Procedure: RADICAL CYSTECTOMY  WITH ILEAL CONDUIT CREATION,BILATERAL PELVIC LYMPHADENECTOMY;  Surgeon: Leonardo Robles MD;  [facility]     CYSTOSCOPY, RETROGRADES, COMBINED Bilateral 3/18/2021    Procedure: CYSTOSCOPY, WITH RETROGRADE PYELOGRAM;  Surgeon: Girish Jack MD;  [facility]     CYSTOSCOPY, TRANSURETHRAL RESECTION (TUR) TUMOR BLADDER, COMBINED N/A 3/18/2021    Procedure: CYSTOSCOPY, WITH TRANSURETHRAL  RESECTION BLADDER TUMOR;  Surgeon: Girish Jack MD;  [facility]     ENDARTERECTOMY CAROTID Left 10/8/2020    Procedure: LEFT CAROTID ENDARTERECTOMY WITH EEG;  Surgeon: Lacho Jasmine MD;  [facility]     EXCISE MASS AXILLA Left 9/16/2016    Procedure: EXCISE MASS AXILLA;  Surgeon: Keyon Blanco MD;  [facility]     HC REMV CATARACT EXTRACAP,INSERT LENS, W/O ECP  6/25/2009    Right eye     INJECT EPIDURAL LUMBAR N/A 4/11/2019    Procedure: interlaminar epidual steroid injection lumbar 4-5;  Surgeon: Oskar Salvador MD;  [facility]     INJECT EPIDURAL LUMBAR Bilateral 9/12/2019    Procedure: lumbar 4-5 epidural interlaminar steroid injection;  Surgeon: Oskar Salvador MD;  [facility]     INSERT PORT VASCULAR ACCESS Right 10/7/2016    Procedure: INSERT PORT VASCULAR ACCESS;  Surgeon: Keyon Blanco MD;  [facility]     IR CHEST PORT PLACEMENT > 5 YRS OF AGE  4/16/2021     MASTECTOMY SIMPLE BILATERAL Bilateral 2/8/2017    Procedure: MASTECTOMY SIMPLE BILATERAL;  Surgeon: Keyon Blanco MD;  [facility]     OPEN REDUCTION INTERNAL FIXATION FOOT Right 3/20/2015    Procedure: OPEN REDUCTION INTERNAL FIXATION FOOT;  Surgeon: Javi Herrmann DPM;  [facility]     OPTICAL TRACKING SYSTEM FUSION SPINE POSTERIOR LUMBAR TWO LEVELS N/A 2/4/2020    Procedure: LUMBAR 2-SACRAL1 TRANSFORMINAL INTERBODY FUSION;  Surgeon: Lenny Gomes MD;  [facility]     REMOVE PORT VASCULAR ACCESS Right 2/14/2018    Procedure: REMOVE PORT VASCULAR ACCESS;  right intra jugular port removal;  Surgeon: Keyon Blanco MD;  [facility]     SHOULDER SURGERY Right 11/2015     Z NONSPECIFIC PROCEDURE  1956    ankle fracture surgery      Allergies   Allergen Reactions     Oxybutynin      Patient reports symptoms of nausea and mind fogginess      Social History     Tobacco Use     Smoking status: Former Smoker     Packs/day: 3.00     Years: 10.00     Pack years: 30.00     Types:  Cigarettes     Quit date: 1979     Years since quittin.8     Smokeless tobacco: Never Used     Tobacco comment: approx. 3 packs daily   Substance Use Topics     Alcohol use: Yes     Alcohol/week: 0.0 standard drinks     Comment: daily glass of wine      Wt Readings from Last 1 Encounters:   21 61.2 kg (135 lb)        Anesthesia Evaluation   Pt has had prior anesthetic. Type: General and MAC.    No history of anesthetic complications       ROS/MED HX  ENT/Pulmonary:     (+) tobacco use, Past use,     Neurologic:  - neg neurologic ROS     Cardiovascular:     (+) Dyslipidemia hypertension-----Previous cardiac testing   Echo: Date: Results:    Stress Test: Date:  Results:  Negative nuclear stress test  ECG Reviewed: Date: 10/8/20 Results:  SR, nonspecific T wave abnormality  Cath: Date: Results:   (-) taking anticoagulants/antiplatelets   METS/Exercise Tolerance: >4 METS    Hematologic:  - neg hematologic  ROS     Musculoskeletal: Comment: Back pain after history of back surgery      GI/Hepatic:     (+) GERD, Asymptomatic on medication,     Renal/Genitourinary:     (+) renal disease, type: ARF and CRI, Pt does not require dialysis,     Endo:  - neg endo ROS     Psychiatric/Substance Use:     (+) psychiatric history anxiety     Infectious Disease:  - neg infectious disease ROS     Malignancy:   (+) Malignancy, History of Breast and Other.Breast CA Remission status post Surgery, Chemo and Radiation.  Other CA bladder  Active status post Chemo.    Other:  - neg other ROS          Physical Exam    Airway  airway exam normal           Respiratory Devices and Support         Dental  no notable dental history         Cardiovascular   cardiovascular exam normal          Pulmonary   pulmonary exam normal                OUTSIDE LABS:  CBC:   Lab Results   Component Value Date    WBC 8.7 2021    WBC 8.4 2021    HGB 11.7 2021    HGB 12.0 2021    HCT 36.0 2021    HCT 37.3 2021      09/30/2021     09/21/2021     BMP:   Lab Results   Component Value Date     09/30/2021     09/21/2021    POTASSIUM 4.3 09/30/2021    POTASSIUM 4.8 09/21/2021    CHLORIDE 109 09/30/2021    CHLORIDE 107 09/21/2021    CO2 25 09/30/2021    CO2 25 09/21/2021    BUN 26 09/30/2021    BUN 34 (H) 09/21/2021    CR 0.91 09/30/2021    CR 1.02 09/21/2021    GLC 86 09/30/2021     (H) 09/21/2021     COAGS:   Lab Results   Component Value Date    PTT 27 05/14/2008    INR 0.91 04/16/2021     POC:   Lab Results   Component Value Date    BGM 96 06/01/2021     HEPATIC:   Lab Results   Component Value Date    ALBUMIN 4.0 09/30/2021    PROTTOTAL 7.9 09/30/2021    ALT 25 09/30/2021    AST 19 09/30/2021    GGT 43 (H) 10/06/2011    ALKPHOS 58 09/30/2021    BILITOTAL 0.2 09/30/2021     OTHER:   Lab Results   Component Value Date    LACT 1.1 02/09/2020    A1C 5.3 09/30/2021    VANDANA 9.3 09/30/2021    PHOS 3.2 09/07/2021    MAG 2.1 06/24/2021    TSH 6.83 (H) 09/30/2021    T4 0.48 (L) 09/30/2021    SED 9 07/16/2008       Anesthesia Plan    ASA Status:  3      Anesthesia Type: General.     - Airway: LMA   Induction: Intravenous.   Maintenance: TIVA.        Consents    Anesthesia Plan(s) and associated risks, benefits, and realistic alternatives discussed. Questions answered and patient/representative(s) expressed understanding.     - Discussed with:  Patient      - Specific Concerns: PONV, sore throat, airway injury, major complications.        Postoperative Care    Pain management: Oral pain medications, IV analgesics, Multi-modal analgesia.   PONV prophylaxis: Ondansetron (or other 5HT-3), Dexamethasone or Solumedrol, Background Propofol Infusion     Comments:    General with LMA            Jean Miranda III, MD

## 2021-10-06 ENCOUNTER — ANESTHESIA (OUTPATIENT)
Dept: SURGERY | Facility: AMBULATORY SURGERY CENTER | Age: 71
End: 2021-10-06
Payer: MEDICARE

## 2021-10-06 ENCOUNTER — TELEPHONE (OUTPATIENT)
Dept: ONCOLOGY | Facility: CLINIC | Age: 71
End: 2021-10-06

## 2021-10-06 ENCOUNTER — MYC MEDICAL ADVICE (OUTPATIENT)
Dept: SURGERY | Facility: CLINIC | Age: 71
End: 2021-10-06

## 2021-10-06 DIAGNOSIS — Z11.59 ENCOUNTER FOR SCREENING FOR OTHER VIRAL DISEASES: ICD-10-CM

## 2021-10-06 NOTE — CONFIDENTIAL NOTE
Called and spoke to patient about rescheduling procedure that was cancelled on 10/06/21 due to patient having a fever and cold symptoms. Patient requested to be rescheduled to the end of October and declined the proposed date of 10/13/21. I let patient know that I was possibly making changes to Dr. Robles's surgery schedule at the end of October and would need to call her back to reschedule when I have a better idea of his schedule for the end of October. Patient did state that she has a vacation planned [place] weekend and that Wednesday 10/20/21 would not work either. I will call patient to reschedule when I know more about Dr. Robles's schedule for 10/27/21. Patient acknowledged and agreed with this plan.

## 2021-10-07 NOTE — CONFIDENTIAL NOTE
Rescheduled patients procedure with Dr. Robles to Wednesday 10/27/21 due to patients illness. Patient is aware procedure is at [facility], had pre-op on 09/30/21, scheduled for covid test at [facility] Monday 10/25/21. Patient declined a new surgery packet.

## 2021-10-11 ENCOUNTER — MYC MEDICAL ADVICE (OUTPATIENT)
Dept: FAMILY MEDICINE | Facility: OTHER | Age: 71
End: 2021-10-11

## 2021-10-11 DIAGNOSIS — R09.81 CONGESTION OF PARANASAL SINUS: Primary | ICD-10-CM

## 2021-10-11 RX ORDER — AMOXICILLIN 500 MG/1
500 CAPSULE ORAL 3 TIMES DAILY
Qty: 30 CAPSULE | Refills: 0 | Status: SHIPPED | OUTPATIENT
Start: 2021-10-11 | End: 2021-10-21

## 2021-10-23 ENCOUNTER — HEALTH MAINTENANCE LETTER (OUTPATIENT)
Age: 71
End: 2021-10-23

## 2021-10-25 ENCOUNTER — LAB (OUTPATIENT)
Dept: LAB | Facility: CLINIC | Age: 71
End: 2021-10-25
Payer: COMMERCIAL

## 2021-10-25 DIAGNOSIS — Z11.59 ENCOUNTER FOR SCREENING FOR OTHER VIRAL DISEASES: ICD-10-CM

## 2021-10-25 PROCEDURE — U0005 INFEC AGEN DETEC AMPLI PROBE: HCPCS

## 2021-10-25 PROCEDURE — U0003 INFECTIOUS AGENT DETECTION BY NUCLEIC ACID (DNA OR RNA); SEVERE ACUTE RESPIRATORY SYNDROME CORONAVIRUS 2 (SARS-COV-2) (CORONAVIRUS DISEASE [COVID-19]), AMPLIFIED PROBE TECHNIQUE, MAKING USE OF HIGH THROUGHPUT TECHNOLOGIES AS DESCRIBED BY CMS-2020-01-R: HCPCS

## 2021-10-26 LAB — SARS-COV-2 RNA RESP QL NAA+PROBE: NEGATIVE

## 2021-10-27 ENCOUNTER — HOSPITAL ENCOUNTER (OUTPATIENT)
Facility: AMBULATORY SURGERY CENTER | Age: 71
End: 2021-10-27
Attending: UROLOGY
Payer: MEDICARE

## 2021-10-27 VITALS
DIASTOLIC BLOOD PRESSURE: 89 MMHG | BODY MASS INDEX: 21.21 KG/M2 | WEIGHT: 132 LBS | OXYGEN SATURATION: 95 % | HEIGHT: 66 IN | RESPIRATION RATE: 16 BRPM | SYSTOLIC BLOOD PRESSURE: 153 MMHG | TEMPERATURE: 98.6 F | HEART RATE: 69 BPM

## 2021-10-27 DIAGNOSIS — C67.9 UROTHELIAL CARCINOMA OF BLADDER (H): ICD-10-CM

## 2021-10-27 PROCEDURE — 88305 TISSUE EXAM BY PATHOLOGIST: CPT | Mod: TC | Performed by: UROLOGY

## 2021-10-27 PROCEDURE — 53899 UNLISTED PX URINARY SYSTEM: CPT

## 2021-10-27 PROCEDURE — 53899 UNLISTED PX URINARY SYSTEM: CPT | Mod: GC | Performed by: UROLOGY

## 2021-10-27 PROCEDURE — 88307 TISSUE EXAM BY PATHOLOGIST: CPT | Mod: TC | Performed by: UROLOGY

## 2021-10-27 RX ORDER — ONDANSETRON 4 MG/1
4 TABLET, ORALLY DISINTEGRATING ORAL EVERY 30 MIN PRN
Status: DISCONTINUED | OUTPATIENT
Start: 2021-10-27 | End: 2021-10-28 | Stop reason: HOSPADM

## 2021-10-27 RX ORDER — GABAPENTIN 100 MG/1
100 CAPSULE ORAL
Status: COMPLETED | OUTPATIENT
Start: 2021-10-27 | End: 2021-10-27

## 2021-10-27 RX ORDER — DEXAMETHASONE SODIUM PHOSPHATE 4 MG/ML
INJECTION, SOLUTION INTRA-ARTICULAR; INTRALESIONAL; INTRAMUSCULAR; INTRAVENOUS; SOFT TISSUE PRN
Status: DISCONTINUED | OUTPATIENT
Start: 2021-10-27 | End: 2021-10-27

## 2021-10-27 RX ORDER — ONDANSETRON 2 MG/ML
4 INJECTION INTRAMUSCULAR; INTRAVENOUS EVERY 30 MIN PRN
Status: DISCONTINUED | OUTPATIENT
Start: 2021-10-27 | End: 2021-10-28 | Stop reason: HOSPADM

## 2021-10-27 RX ORDER — MEPERIDINE HYDROCHLORIDE 25 MG/ML
12.5 INJECTION INTRAMUSCULAR; INTRAVENOUS; SUBCUTANEOUS
Status: DISCONTINUED | OUTPATIENT
Start: 2021-10-27 | End: 2021-10-28 | Stop reason: HOSPADM

## 2021-10-27 RX ORDER — ACETAMINOPHEN 325 MG/1
975 TABLET ORAL ONCE
Status: COMPLETED | OUTPATIENT
Start: 2021-10-27 | End: 2021-10-27

## 2021-10-27 RX ORDER — FENTANYL CITRATE 50 UG/ML
25-50 INJECTION, SOLUTION INTRAMUSCULAR; INTRAVENOUS EVERY 5 MIN PRN
Status: DISCONTINUED | OUTPATIENT
Start: 2021-10-27 | End: 2021-10-27 | Stop reason: HOSPADM

## 2021-10-27 RX ORDER — FENTANYL CITRATE 50 UG/ML
25-50 INJECTION, SOLUTION INTRAMUSCULAR; INTRAVENOUS
Status: DISCONTINUED | OUTPATIENT
Start: 2021-10-27 | End: 2021-10-28 | Stop reason: HOSPADM

## 2021-10-27 RX ORDER — SODIUM CHLORIDE, SODIUM LACTATE, POTASSIUM CHLORIDE, CALCIUM CHLORIDE 600; 310; 30; 20 MG/100ML; MG/100ML; MG/100ML; MG/100ML
INJECTION, SOLUTION INTRAVENOUS CONTINUOUS
Status: DISCONTINUED | OUTPATIENT
Start: 2021-10-27 | End: 2021-10-27 | Stop reason: HOSPADM

## 2021-10-27 RX ORDER — HEPARIN SODIUM (PORCINE) LOCK FLUSH IV SOLN 100 UNIT/ML 100 UNIT/ML
500 SOLUTION INTRAVENOUS EVERY 8 HOURS
Status: DISCONTINUED | OUTPATIENT
Start: 2021-10-27 | End: 2021-10-28 | Stop reason: HOSPADM

## 2021-10-27 RX ORDER — LIDOCAINE 40 MG/G
CREAM TOPICAL
Status: DISCONTINUED | OUTPATIENT
Start: 2021-10-27 | End: 2021-10-27 | Stop reason: HOSPADM

## 2021-10-27 RX ORDER — CEFAZOLIN SODIUM 2 G/50ML
2 SOLUTION INTRAVENOUS SEE ADMIN INSTRUCTIONS
Status: DISCONTINUED | OUTPATIENT
Start: 2021-10-27 | End: 2021-10-27 | Stop reason: HOSPADM

## 2021-10-27 RX ORDER — HYDROMORPHONE HYDROCHLORIDE 1 MG/ML
0.2 INJECTION, SOLUTION INTRAMUSCULAR; INTRAVENOUS; SUBCUTANEOUS EVERY 5 MIN PRN
Status: DISCONTINUED | OUTPATIENT
Start: 2021-10-27 | End: 2021-10-27 | Stop reason: HOSPADM

## 2021-10-27 RX ORDER — FENTANYL CITRATE 50 UG/ML
25 INJECTION, SOLUTION INTRAMUSCULAR; INTRAVENOUS EVERY 5 MIN PRN
Status: DISCONTINUED | OUTPATIENT
Start: 2021-10-27 | End: 2021-10-27 | Stop reason: HOSPADM

## 2021-10-27 RX ORDER — PROPOFOL 10 MG/ML
INJECTION, EMULSION INTRAVENOUS CONTINUOUS PRN
Status: DISCONTINUED | OUTPATIENT
Start: 2021-10-27 | End: 2021-10-27

## 2021-10-27 RX ORDER — LIDOCAINE HYDROCHLORIDE 20 MG/ML
INJECTION, SOLUTION INFILTRATION; PERINEURAL PRN
Status: DISCONTINUED | OUTPATIENT
Start: 2021-10-27 | End: 2021-10-27

## 2021-10-27 RX ORDER — ALBUTEROL SULFATE 0.83 MG/ML
2.5 SOLUTION RESPIRATORY (INHALATION) EVERY 4 HOURS PRN
Status: DISCONTINUED | OUTPATIENT
Start: 2021-10-27 | End: 2021-10-27 | Stop reason: HOSPADM

## 2021-10-27 RX ORDER — FENTANYL CITRATE 50 UG/ML
INJECTION, SOLUTION INTRAMUSCULAR; INTRAVENOUS PRN
Status: DISCONTINUED | OUTPATIENT
Start: 2021-10-27 | End: 2021-10-27

## 2021-10-27 RX ORDER — OXYCODONE HYDROCHLORIDE 5 MG/1
5 TABLET ORAL EVERY 4 HOURS PRN
Status: DISCONTINUED | OUTPATIENT
Start: 2021-10-27 | End: 2021-10-28 | Stop reason: HOSPADM

## 2021-10-27 RX ORDER — PROPOFOL 10 MG/ML
INJECTION, EMULSION INTRAVENOUS PRN
Status: DISCONTINUED | OUTPATIENT
Start: 2021-10-27 | End: 2021-10-27

## 2021-10-27 RX ORDER — SENNA AND DOCUSATE SODIUM 50; 8.6 MG/1; MG/1
1 TABLET, FILM COATED ORAL AT BEDTIME
Qty: 30 TABLET | Refills: 0 | Status: SHIPPED | OUTPATIENT
Start: 2021-10-27 | End: 2023-01-02

## 2021-10-27 RX ORDER — BUPIVACAINE HYDROCHLORIDE 2.5 MG/ML
INJECTION, SOLUTION EPIDURAL; INFILTRATION; INTRACAUDAL PRN
Status: DISCONTINUED | OUTPATIENT
Start: 2021-10-27 | End: 2021-10-27 | Stop reason: HOSPADM

## 2021-10-27 RX ORDER — SODIUM CHLORIDE, SODIUM LACTATE, POTASSIUM CHLORIDE, CALCIUM CHLORIDE 600; 310; 30; 20 MG/100ML; MG/100ML; MG/100ML; MG/100ML
INJECTION, SOLUTION INTRAVENOUS CONTINUOUS
Status: DISCONTINUED | OUTPATIENT
Start: 2021-10-27 | End: 2021-10-28 | Stop reason: HOSPADM

## 2021-10-27 RX ORDER — ACETAMINOPHEN 325 MG/1
975 TABLET ORAL ONCE
Status: DISCONTINUED | OUTPATIENT
Start: 2021-10-27 | End: 2021-10-27 | Stop reason: HOSPADM

## 2021-10-27 RX ORDER — CEFAZOLIN SODIUM 2 G/50ML
2 SOLUTION INTRAVENOUS
Status: COMPLETED | OUTPATIENT
Start: 2021-10-27 | End: 2021-10-27

## 2021-10-27 RX ORDER — OXYCODONE HYDROCHLORIDE 5 MG/1
5 TABLET ORAL EVERY 6 HOURS PRN
Qty: 12 TABLET | Refills: 0 | Status: SHIPPED | OUTPATIENT
Start: 2021-10-27 | End: 2021-10-30

## 2021-10-27 RX ORDER — ONDANSETRON 2 MG/ML
INJECTION INTRAMUSCULAR; INTRAVENOUS PRN
Status: DISCONTINUED | OUTPATIENT
Start: 2021-10-27 | End: 2021-10-27

## 2021-10-27 RX ADMIN — ONDANSETRON 4 MG: 2 INJECTION INTRAMUSCULAR; INTRAVENOUS at 13:38

## 2021-10-27 RX ADMIN — HEPARIN SODIUM (PORCINE) LOCK FLUSH IV SOLN 100 UNIT/ML 500 UNITS: 100 SOLUTION at 14:41

## 2021-10-27 RX ADMIN — DEXAMETHASONE SODIUM PHOSPHATE 4 MG: 4 INJECTION, SOLUTION INTRA-ARTICULAR; INTRALESIONAL; INTRAMUSCULAR; INTRAVENOUS; SOFT TISSUE at 13:38

## 2021-10-27 RX ADMIN — CEFAZOLIN SODIUM 2 G: 2 SOLUTION INTRAVENOUS at 13:20

## 2021-10-27 RX ADMIN — FENTANYL CITRATE 50 MCG: 50 INJECTION, SOLUTION INTRAMUSCULAR; INTRAVENOUS at 13:31

## 2021-10-27 RX ADMIN — GABAPENTIN 100 MG: 100 CAPSULE ORAL at 10:19

## 2021-10-27 RX ADMIN — FENTANYL CITRATE 50 MCG: 50 INJECTION, SOLUTION INTRAMUSCULAR; INTRAVENOUS at 13:48

## 2021-10-27 RX ADMIN — ACETAMINOPHEN 975 MG: 325 TABLET ORAL at 12:43

## 2021-10-27 RX ADMIN — SODIUM CHLORIDE, SODIUM LACTATE, POTASSIUM CHLORIDE, CALCIUM CHLORIDE: 600; 310; 30; 20 INJECTION, SOLUTION INTRAVENOUS at 13:16

## 2021-10-27 RX ADMIN — PROPOFOL 200 MG: 10 INJECTION, EMULSION INTRAVENOUS at 13:19

## 2021-10-27 RX ADMIN — PROPOFOL 125 MCG/KG/MIN: 10 INJECTION, EMULSION INTRAVENOUS at 13:19

## 2021-10-27 RX ADMIN — LIDOCAINE HYDROCHLORIDE 60 MG: 20 INJECTION, SOLUTION INFILTRATION; PERINEURAL at 13:19

## 2021-10-27 ASSESSMENT — MIFFLIN-ST. JEOR: SCORE: 1125.5

## 2021-10-27 NOTE — ANESTHESIA CARE TRANSFER NOTE
Patient: Michaela Dorman    Procedure: Procedure(s):  DISTAL URETHRECTOMY       Diagnosis: Urothelial carcinoma of bladder (H) [C67.9]  Diagnosis Additional Information: No value filed.    Anesthesia Type:   General     Note:    Oropharynx: oropharynx clear of all foreign objects and spontaneously breathing  Level of Consciousness: awake  Oxygen Supplementation: nasal cannula  Level of Supplemental Oxygen (L/min / FiO2): 2  Independent Airway: airway patency satisfactory and stable  Dentition: dentition unchanged  Vital Signs Stable: post-procedure vital signs reviewed and stable  Report to RN Given: handoff report given  Patient transferred to: PACU    Handoff Report: Identifed the Patient, Identified the Reponsible Provider, Reviewed the pertinent medical history, Discussed the surgical course, Reviewed Intra-OP anesthesia mangement and issues during anesthesia, Set expectations for post-procedure period and Allowed opportunity for questions and acknowledgement of understanding      Vitals:  Vitals Value Taken Time   /93    Temp 36.4    Pulse 77 10/27/21 1419   Resp 14 10/27/21 1419   SpO2 99 % 10/27/21 1419   Vitals shown include unvalidated device data.    Electronically Signed By: LISA Hahn CRNA  October 27, 2021  2:21 PM

## 2021-10-27 NOTE — BRIEF OP NOTE
[facility] Brief Operative Note    Pre-operative diagnosis: Urothelial carcinoma of bladder (H) [C67.9]   Post-operative diagnosis * No post-op diagnosis entered *     Procedure: Procedure(s):  DISTAL URETHRECTOMY   Surgeon(s): Surgeon(s) and Role:     * Leonardo Robles MD - Primary   Estimated blood loss: 5 mL    Specimens: ID Type Source Tests Collected by Time Destination   1 : distal urethera Tissue Other SURGICAL PATHOLOGY EXAM Leonardo Robles MD 10/27/2021  1:51 PM    2 : Distal urethera proximal margins Tissue Other SURGICAL PATHOLOGY EXAM Leonardo Robles MD 10/27/2021  1:54 PM       Findings: Distal urethral remnant excised in its entirety.

## 2021-10-27 NOTE — DISCHARGE INSTRUCTIONS
"[facility]  Home Care Following Anesthesia  For 24 hours after surgery:  1. Get plenty of rest.  A responsible adult must stay with you for at least 24 hours after you leave the surgery center.  2. Do not drive or use heavy equipment.  If you have weakness or tingling, don't drive or use heavy equipment until this feeling goes away.   3. Do not drink alcohol.   4. Avoid strenuous or risky activities.  Ask for help when climbing stairs.  5. You may feel lightheaded.  IF so, sit for a few minutes before standing.  Have someone help you get up.   6. If you have nausea (feel sick to your stomach): Drink only clear liquids such as apple juice, ginger ale, broth or 7-Up.  Rest may also help.  Be sure to drink enough fluids.  Move to a regular diet as you feel able.   7. You may have a slight fever.  Call the doctor if your fever is over 100 F (37.7 C) (taken under the tongue) or lasts longer than 24 hours.  8. You may have a dry mouth, a sore throat, muscle aches or trouble sleeping. These should go away after 24 hours.  9. Do not make important or legal decisions.   10. It is recommended to avoid smoking.        Today you received a Marcaine or bupivacaine block to numb the nerves near your surgery site.  This is a block using local anesthetic or \"numbing\" medication injected around the nerves to anesthetize or \"numb\" the area supplied by those nerves.  This block is injected into the muscle layer near your surgical site.  The medication may numb the location where you had surgery for 6-18 hours, but may last up to 24 hours.  If your surgical site is an arm or leg you should be careful with your affected limb, since it is possible to injure your limb without being aware of it due to the numbing.  Until full feeling returns, you should guard against bumping or hitting your limb, and avoid extreme hot or cold temperatures on the skin.  As the block wears off, the feeling will return as " a tingling or prickly sensation near your surgical site.  You will experience more discomfort from your incision as the feeling returns.  You may want to take a pain pill (a narcotic or Tylenol if this was prescribed by your surgeon) when you start to experience mild pain before the pain beccomes more severe.  If your pain medications do not control your pain you should notifiy your surgeon.    Tips for taking pain medications  To get the best pain relief possible, remember these points:    Take pain medications as directed, before pain becomes severe.    Pain medication can upset your stomach: taking it with food may help.    Constipation is a common side effect of pain medication. Drink plenty of  fluids.    Eat foods high in fiber. Take a stool softener if recommended by your doctor or pharmacist.    Do not drink alcohol, drive or operate machinery while taking pain medications.    Ask about other ways to control pain, such as with heat, ice or relaxation.    Tylenol/Acetaminophen Consumption  To help encourage the safe use of acetaminophen, the makers of TYLENOL  have lowered the maximum daily dose for single-ingredient Extra Strength TYLENOL  (acetaminophen) products sold in the [place] from 8 pills per day (4,000 mg) to 6 pills per day (3,000 mg). The dosing interval has also changed from 2 pills every 4-6 hours to 2 pills every 6 hours.    If you feel your pain relief is insufficient, you may take Tylenol/Acetaminophen in addition to your narcotic pain medication.     Be careful not to exceed 3,000 mg of Tylenol/Acetaminophen in a 24 hour period from all sources.    If you are taking extra strength Tylenol/acetaminophen (500 mg), the maximum dose is 6 tablets in 24 hours.    If you are taking regular strength acetaminophen (325 mg), the maximum dose is 9 tablets in 24 hours.    Call a doctor for any of the followin. Signs of infection (fever, growing tenderness at the surgery site, a large amount of  drainage or bleeding, severe pain, foul-smelling drainage, redness, swelling).  2. It has been over 8 to 10 hours since surgery and you are still not able to urinate (pass water).  3. Headache for over 24 hours.  4. Numbness, tingling or weakness the day after surgery (if you had spinal anesthesia).  5. Signs of Covid-19 infection (temperature over 100 degrees, shortness of breath, cough, loss of taste/smell, generalized body aches, persistent headache, chills, sore throat, nausea/vomiting/diarrhea)  Your doctor is: Dr. Leonardo Robles, Prostate and Urology: [phone]               Or dial [phone] and ask for the resident on call for:  Prostate Urology  For emergency care, call the:  [facility]:  [phone] (TTY for hearing impaired: [phone])

## 2021-10-27 NOTE — ANESTHESIA POSTPROCEDURE EVALUATION
Patient: Michaela Dorman    Procedure: Procedure(s):  DISTAL URETHRECTOMY       Diagnosis:Urothelial carcinoma of bladder (H) [C67.9]  Diagnosis Additional Information: No value filed.    Anesthesia Type:  General    Note:  Disposition: Outpatient   Postop Pain Control: Uneventful            Sign Out: Well controlled pain   PONV: No   Neuro/Psych: Uneventful            Sign Out: Acceptable/Baseline neuro status   Airway/Respiratory: Uneventful            Sign Out: Acceptable/Baseline resp. status   CV/Hemodynamics: Uneventful            Sign Out: Acceptable CV status; No obvious hypovolemia; No obvious fluid overload   Other NRE: NONE   DID A NON-ROUTINE EVENT OCCUR? No           Last vitals:  Vitals Value Taken Time   /80 10/27/21 1425   Temp 37  C (98.6  F) 10/27/21 1425   Pulse 74 10/27/21 1426   Resp 18 10/27/21 1426   SpO2 95 % 10/27/21 1425   Vitals shown include unvalidated device data.    Electronically Signed By: Glen Matos MD  October 27, 2021  3:33 PM

## 2021-10-27 NOTE — OP NOTE
OPERATIVE NOTE    DATE OF PROCEDURE: October 27, 2021  10/27/2021    PREOPERATIVE DIAGNOSIS:  Bladder cancer  POSTOPERATIVE DIAGNOSIS:  Same    PROCEDURES PERFORMED:   1. Distal urethrectomy with vaginal biopsy    STAFF SURGEON: Leonardo Robles MD, present for entire case.     RESIDENT(S): Jamison Stokes MD    ANESTHESIA: GETA  ESTIMATED BLOOD LOSS: 5 mL.   IV FLUIDS: see dictated anesthesia record  COMPLICATIONS: None.   SPECIMEN:    ID Type Source Tests Collected by Time Destination   1 : distal urethera Tissue Urethra SURGICAL PATHOLOGY EXAM Leonardo Robles MD 10/27/2021  1:51 PM    2 : Distal urethera proximal margins Tissue Urethra SURGICAL PATHOLOGY EXAM Leonardo Robles MD 10/27/2021  1:54 PM      SIGNIFICANT FINDINGS:   None.     BRIEF OPERATIVE INDICATIONS: Michaela Dorman is a(n) 71 year old female with PMH of bladder cancer s/p cystectomy with ileal conduit 06/01/2021. Stage pT2bN0 disease. Pathology demonstrated positive urethral margins for CIS. As such after a discussion of the risks and benefits associated with surgery she elected to proceed with vaginal biopsy with exam under anesthesia and resection of any additional urethral tissue.    PROCEDURE IN DETAIL: After informed consent was obtained, the patient was taken to the operating room and placed in dorsal lithotomy position in supportive yellowfin stirrups with care in neural safety in positioning of all four extremities. Pneumoboots were applied, preoperative antibiotics were administered IV, and general anesthesia was induced. Once appropriate anesthesia was induced, patient was prepped and draped in the standard sterile fashion. A timeout was performed with the operating room staff.    A lone star retractor and metal half speculum were used to retract vulvar/vaginal tissue and expose the anterior vaginal wall, where there appeared to be a short segment of remnant distal urethra. A 14 Fr red rubber catheter was used to  delineate the length of residual urethra which was approximately 1 cm in length. We then using a mixture of sharp dissection and cut electrocautery to excise the residual urethral mucosa. We started by making a half-Tanana incision from 9 to 3 o clock, we then proceeded to dissect away the anterior portion of the residual urethra from the surrounding vaginal stromal tissue to the end of where the catheter could be passed.. The posterior urethral remnant was then carefully dissected away from the anterior vaginal epithelium to preserve this for closure. Finally the most proximal end of the urethral remnant was fully excised. A margin was also taken from the most proximal end. No connection with the peritoneal cavity was identified nor was residual urethral tissue.    The defect was closed with 2 interrupted deep stitches with 3-0 vicryl within the vaginal stromal tissue to re approximate the vaginal epithelium. 5 interrupted stiches with 3-0 vicryl were used to close the vaginal epithelium.     Patient was awakened from anesthesia and brought to the recovery room in stable condition. She tolerated the procedure well.    Plan:  -f/u in 2 weeks w/ Dr. Robles in clinic.     Jamison Stokes  PGY-2    Physician Attestation   I was present for the entire procedure between opening and closing, including all key portions of the operation.    Leonardo Robles MD  Urology  [facility]

## 2021-10-29 LAB
PATH REPORT.COMMENTS IMP SPEC: NORMAL
PATH REPORT.COMMENTS IMP SPEC: NORMAL
PATH REPORT.FINAL DX SPEC: NORMAL
PATH REPORT.GROSS SPEC: NORMAL
PATH REPORT.MICROSCOPIC SPEC OTHER STN: NORMAL
PHOTO IMAGE: NORMAL

## 2021-10-29 PROCEDURE — 88307 TISSUE EXAM BY PATHOLOGIST: CPT | Mod: 26 | Performed by: PATHOLOGY

## 2021-10-29 PROCEDURE — 88305 TISSUE EXAM BY PATHOLOGIST: CPT | Mod: 26 | Performed by: PATHOLOGY

## 2021-11-02 ENCOUNTER — VIRTUAL VISIT (OUTPATIENT)
Dept: ONCOLOGY | Facility: CLINIC | Age: 71
End: 2021-11-02
Payer: COMMERCIAL

## 2021-11-02 VITALS — HEIGHT: 66 IN | WEIGHT: 132 LBS | BODY MASS INDEX: 21.21 KG/M2

## 2021-11-02 DIAGNOSIS — C67.8 MALIGNANT NEOPLASM OF OVERLAPPING SITES OF BLADDER (H): Primary | ICD-10-CM

## 2021-11-02 PROCEDURE — 99215 OFFICE O/P EST HI 40 MIN: CPT | Mod: 95 | Performed by: INTERNAL MEDICINE

## 2021-11-02 RX ORDER — EPINEPHRINE 1 MG/ML
0.3 INJECTION, SOLUTION, CONCENTRATE INTRAVENOUS EVERY 5 MIN PRN
Status: CANCELLED | OUTPATIENT
Start: 2021-11-02

## 2021-11-02 RX ORDER — LORAZEPAM 2 MG/ML
0.5 INJECTION INTRAMUSCULAR EVERY 4 HOURS PRN
Status: CANCELLED | OUTPATIENT
Start: 2021-11-02

## 2021-11-02 RX ORDER — HEPARIN SODIUM,PORCINE 10 UNIT/ML
5 VIAL (ML) INTRAVENOUS
Status: CANCELLED | OUTPATIENT
Start: 2021-11-02

## 2021-11-02 RX ORDER — HEPARIN SODIUM (PORCINE) LOCK FLUSH IV SOLN 100 UNIT/ML 100 UNIT/ML
5 SOLUTION INTRAVENOUS
Status: CANCELLED | OUTPATIENT
Start: 2021-11-02

## 2021-11-02 RX ORDER — METHYLPREDNISOLONE SODIUM SUCCINATE 125 MG/2ML
125 INJECTION, POWDER, LYOPHILIZED, FOR SOLUTION INTRAMUSCULAR; INTRAVENOUS
Status: CANCELLED
Start: 2021-11-02

## 2021-11-02 RX ORDER — NALOXONE HYDROCHLORIDE 0.4 MG/ML
0.2 INJECTION, SOLUTION INTRAMUSCULAR; INTRAVENOUS; SUBCUTANEOUS
Status: CANCELLED | OUTPATIENT
Start: 2021-11-02

## 2021-11-02 RX ORDER — ALBUTEROL SULFATE 90 UG/1
1-2 AEROSOL, METERED RESPIRATORY (INHALATION)
Status: CANCELLED
Start: 2021-11-02

## 2021-11-02 RX ORDER — DIPHENHYDRAMINE HYDROCHLORIDE 50 MG/ML
50 INJECTION INTRAMUSCULAR; INTRAVENOUS
Status: CANCELLED
Start: 2021-11-02

## 2021-11-02 RX ORDER — MEPERIDINE HYDROCHLORIDE 25 MG/ML
25 INJECTION INTRAMUSCULAR; INTRAVENOUS; SUBCUTANEOUS EVERY 30 MIN PRN
Status: CANCELLED | OUTPATIENT
Start: 2021-11-02

## 2021-11-02 RX ORDER — ALBUTEROL SULFATE 0.83 MG/ML
2.5 SOLUTION RESPIRATORY (INHALATION)
Status: CANCELLED | OUTPATIENT
Start: 2021-11-02

## 2021-11-02 ASSESSMENT — MIFFLIN-ST. JEOR: SCORE: 1125.5

## 2021-11-02 ASSESSMENT — PAIN SCALES - GENERAL: PAINLEVEL: NO PAIN (0)

## 2021-11-02 NOTE — PATIENT INSTRUCTIONS
1) Checking PA on adjuvant Opdivo (q4 weeks x 4 cycles w/ provider and labs).  2) CT CAP prior to 4th cycle w/ BJT.    Today:  Plan to start Opdivo. Will get once every 4 weeks X4 treatments. Plan for CT scan prior to 4th treatment.    Please follow up with Dr. Sauer or Nadia Islas CNP monthly with labs and treatment.  Please schedule CT scan prior to follow up appointment with the 4th treatment.    Lab Date/Time:  11/18/21 at 8:30  Office visit follow up with Nadia Islas CNP Date/Time: 11/18/21 at 9:00  Infusion Date/Time:  11/18/21 at 10:30    Lab Date/Time:  12/14/21 at 12:00  Video visit follow up with Dr. Sauer Date/Time:  12/14/21 at 12:30   Infusion Date/Time:  12/15/21 at 9:30    Lab Date/Time:  1/11/22 at 8:30  Video visit follow up with Dr. Sauer Date/Time:  1/11/22 at 9:00   Infusion Date/Time:  1/11/22 at 9:30    CT Date/Time:  2/4/22 at 9:20    Lab Date/Time:  2/8/22 at 10:30  Video visit follow up with Dr. Sauer Date/Time:  2/8/22 at 11:00   Infusion Date/Time:  2/8/22 at 11:30    If you have any questions or concerns please feel free to call.    If you need to reschedule please call:  Clinic or Lab Appointment - [phone]  Infusion - [phone]  Imaging - [phone]    Juan José Hernandez RN, BSN, OCN  [facility]   Oncology/Hematology Care Coordinator RN  [facility]  [phone]    After Hours Oncology Nurse Line - [phone]          Patient Education     Opdivo  Generic Name: Nivolumab  Drug type   Opdivo works to help the immune system attack cancer cells.  What this drug is used for   Melanoma, lung, kidney, head and neck cancers, Hodgkin Lymphoma or _____________________  How this drug is given  This drug is given through an IV line, a small tube inserted into a vein. Please let your nurse know right away if you feel pain, discomfort or heat during your infusion; or if you see redness on the skin where the IV is placed.  The amount of medicine that you will  receive depends on many things. Your doctor will decide on the best dose for you.  Make sure your health care team knows about all the medicines and supplements you take. This will help prevent drug interactions.   Side effects   Most people do not have all the side effects listed. Side effects will usually go away after the treatment is complete. Some of the rare side effects are not listed here, so tell your doctor if you have any unusual symptoms.  Common side effects   (occur in more than 3 out of 10 people):     Feeling very tired    Rash    Loose or watery stools (diarrhea)    Not feeling hungry    Feeling sick to your stomach    Yellowing of eyes or skin    Weakness    Back pain    Bone and muscle pain    Fever    Symptoms of cold or flu  Less common side effects   (occur in less than 3 out of 10 people):    Dizziness    Skin changes (redness, very dry, itching)    Changes in thyroid: weight gain or loss, feeling cold, sweat more, hair loss, very tired, extra thirsty. Your doctor will watch for the signs.    Vision changes    Changes in your kidney (blood in urine, swelling in ankles, less urine than normal)  Call your doctor right away if you have:     Fever of 100.5 F (38 C) or higher or chills    Blood in your stool    Seeing or hearing things that are not really there    Seizures    Neck stiffness    Chest pain  Call your doctor within 24 hours if you have:    Loose, watery stools 4 to 6 times in 24 hours and medicine does not help    New or worse cough, shortness of breath    Severe stomach pain or tenderness  Other information: __________________________  __________________________________________  __________________________________________  If you have any side effects, call us. We can help you manage these problems.  For more information, see:  www.chemocare.com  http://www.nlm.nih.gov/medlineplus/druginformation.htm  http://www.cancer.gov/cancertopics/coping/chemotherapy-and-you  For informational  purposes only. Not to replace the advice of your health care provider.   Copyright   2017 [employer]. All rights reserved. [employer] 075221 - 05/17.  For informational purposes only. Not to replace the advice of your health care provider.  Copyright   2018 [employer]. All rights reserved.

## 2021-11-02 NOTE — LETTER
2021         RE: Michaela Dorman  [address]  [place] [address]        Dear Colleague,    Thank you for referring your patient, Michaela Dorman, to the [facility]. Please see a copy of my visit note below.    [facility] Hematology / Oncology  Progress Note 2021  Name: Michaela Dorman  :  1950    MRN:  [id number]    --------------------    Assessment / Plan:  Clinically well.  Recovering well from surgery.  We reviewed pros and cons of adjuvant nivolumab for prevention of recurrence of bladder cancer.  We reviewed autoimmune side effects including, but not limited to rash, diarrhea, hepatitis, hormone disruption among many others.  We reviewed logistics of administration and will pursue prior authorization for Opdivo.    Girish Sauer MD    --------------------    Interval History:  Michaela returns for follow up bladder and breast cancer.  Feeling quite well.  No major post-surgical concerns.  No bowel complaints.  Energy has returned.  Weight and appetite normal.    --------------------    Physical Exam:  Video visit.    Labs / Imaging / Path:  We reviewed labs and imaging and pathology.    Video Visit:  Michaela is a 71 year old who is being evaluated via a billable video visit.    Video Start Time: 10:02 AM.  Video End Time: 10:29 AM.        Again, thank you for allowing me to participate in the care of your patient.        Sincerely,        Girish Sauer MD

## 2021-11-02 NOTE — LETTER
2021         RE: Michaela Dorman  [address]  [place] [place] [address]        Dear Colleague,    Thank you for referring your patient, Michaela Dorman, to the [facility]. Please see a copy of my visit note below.    [facility] Hematology / Oncology  Progress Note 2021  Name: Michaela Dorman  :  1950    MRN:  [id number]    --------------------    Assessment / Plan:  Clinically well.  Recovering well from surgery.  We reviewed pros and cons of adjuvant nivolumab for prevention of recurrence of bladder cancer.  We reviewed autoimmune side effects including, but not limited to rash, diarrhea, hepatitis, hormone disruption among many others.  We reviewed logistics of administration and will pursue prior authorization for Opdivo.    Girish Sauer MD    --------------------    Interval History:  Michaela returns for follow up bladder and breast cancer.  Feeling quite well.  No major post-surgical concerns.  No bowel complaints.  Energy has returned.  Weight and appetite normal.    --------------------    Physical Exam:  Video visit.    Labs / Imaging / Path:  We reviewed labs and imaging and pathology.    Video Visit:  Michaela is a 71 year old who is being evaluated via a billable video visit.    Video Start Time: 10:02 AM.  Video End Time: 10:29 AM.        Again, thank you for allowing me to participate in the care of your patient.        Sincerely,        Girish Sauer MD

## 2021-11-02 NOTE — PROGRESS NOTES
[facility] Hematology / Oncology  Progress Note 2021  Name: Michaela Dorman  :  1950    MRN:  [id number]    --------------------    Assessment / Plan:  Left-sided ER-, HER2+ breast CA:  # Sep 2016 Presented w/ left axillary mass; staging multicentric T4 lesion w/ geraldine involvement; biopsy w/ ER-, HER2+ breast cancer  # Sep 2016 - 2017 Neoadjuvant AC x 4 cycles/TH x 4 cycles.  # 2017 Bilateral mastectomy no with geraldine evaluation; complete path CR from invasive cancer; 7 mm DCIS residual.  # 2017 - May 2017 Adjuvant radiation to left chestwall / axilla.  # 2017 - Dec 2017 Adjuvant Herceptin.       Bladder cancer:([id number])  # Oct 2020 Presented w/ dysuria.  # 2021 Cytoscopy w/ muscle-invasive high grade urothelial cancer.  # Mar 2021 TURBT.  # 2021 Neoadjuvant cisplatin/gem split dose complicated by TAMIR.  # May 2021 Neoadjuvant carboplatin/gem.  # 2021 Radical cystectomy w/ ileal conduit; path high grade urothelial carcinoma muscle invasive; margins negs; nodes negative.      Clinically well.  Recovering well from surgery.  Reviewed imaging which shows DAGOBERTO beyond some incidental findings.  We reviewed pros and cons of adjuvant nivolumab for prevention of recurrence of bladder cancer.  We reviewed autoimmune side effects including, but not limited to rash, diarrhea, hepatitis, hormone disruption among many others.  We reviewed logistics of administration and will pursue prior authorization for Opdivo.    Girish Sauer MD    --------------------    Interval History:  Michaela returns for follow up bladder and breast cancer.  Feeling quite well.  No major post-surgical concerns.  No bowel complaints.  Energy has returned.  Weight and appetite normal.    --------------------    Physical Exam:  Video visit.    Labs / Imaging / Path:  We reviewed labs and imaging and pathology.    Video Visit:  Michaela is a 71 year old female who is being evaluated via a billable  video visit.  }    Video start time: 10:02 AM  Video end time: 10:29 AM    Provider location: Counts include 234 beds at the [facility].  Patient location: Home.    Mode of transmission:  Portal / [employer].

## 2021-11-03 ENCOUNTER — TELEPHONE (OUTPATIENT)
Dept: ONCOLOGY | Facility: CLINIC | Age: 71
End: 2021-11-03

## 2021-11-03 ENCOUNTER — TELEPHONE (OUTPATIENT)
Dept: UROLOGY | Facility: CLINIC | Age: 71
End: 2021-11-03

## 2021-11-03 NOTE — TELEPHONE ENCOUNTER
Called pt explaining that Dr. Robles wants to see her for a post op follow up. An appt has been made for her to come into the clinic on 11/16 at 2:30pm. Left clinic number for the pt to call if the appt date/time does not work for her.

## 2021-11-03 NOTE — TELEPHONE ENCOUNTER
Patient had video visit on 11/2 and plan to start opdivo. Patient scheduled for everything with follow up and CT scan prior to 4th cycle. Patient denies formal teaching as she has had chemotherapy and teaching in the past. Called patient with plan of care and reviewed opdivo and immunotherapy in detail. Mailed copy of AVS and patient information on Opdivo. Patient denies questions or concerns. She has our contact information and encouraged to call us with any that arise in the interim of care.    Juan José Hernandez RN, BSN, OCN  11/3/2021, 1:19 PM

## 2021-11-08 ENCOUNTER — PRE VISIT (OUTPATIENT)
Dept: UROLOGY | Facility: CLINIC | Age: 71
End: 2021-11-08
Payer: COMMERCIAL

## 2021-11-08 NOTE — TELEPHONE ENCOUNTER
Reason for visit: Post op follow up    Relevant information: Bladder cancer; CIS in lorrie-urethral ducts; distal urethrectomy on 10/27    Records/imaging/labs/orders: All records available    Pt called: N/A    At Rooming: Standard

## 2021-11-10 ENCOUNTER — MYC MEDICAL ADVICE (OUTPATIENT)
Dept: FAMILY MEDICINE | Facility: OTHER | Age: 71
End: 2021-11-10
Payer: COMMERCIAL

## 2021-11-10 DIAGNOSIS — G89.4 CHRONIC PAIN SYNDROME: ICD-10-CM

## 2021-11-10 RX ORDER — OXYCODONE AND ACETAMINOPHEN 5; 325 MG/1; MG/1
.5-1 TABLET ORAL EVERY 6 HOURS PRN
Qty: 30 TABLET | Refills: 0 | Status: SHIPPED | OUTPATIENT
Start: 2021-11-10 | End: 2021-12-27

## 2021-11-12 DIAGNOSIS — C67.9 UROTHELIAL CARCINOMA OF BLADDER WITH INVASION OF MUSCLE (H): ICD-10-CM

## 2021-11-12 DIAGNOSIS — Z51.11 ENCOUNTER FOR ANTINEOPLASTIC CHEMOTHERAPY: ICD-10-CM

## 2021-11-12 DIAGNOSIS — C50.412 MALIGNANT NEOPLASM OF UPPER-OUTER QUADRANT OF LEFT FEMALE BREAST, UNSPECIFIED ESTROGEN RECEPTOR STATUS (H): ICD-10-CM

## 2021-11-14 ENCOUNTER — MYC MEDICAL ADVICE (OUTPATIENT)
Dept: ONCOLOGY | Facility: CLINIC | Age: 71
End: 2021-11-14
Payer: COMMERCIAL

## 2021-11-15 NOTE — TELEPHONE ENCOUNTER
Called and reviewed with patient after reviewing recommendations with Nadia Islas CNP to completely avoid alcohol due to risk for bleeding and concerns with immunotherapy and having an immune response which could attack her liver and drinking alcohol is matabolized through the liver along with the treatment and broken down cancer cells. Patient has follow up on 11/18/21 for Oncology follow up and CNP will review in detail. Patient verbalizes understanding and will plan to stop drinking. No other questions or concerns at this time.    Juan José Hernandez RN, BSN, OCN  11/15/2021, 9:54 AM

## 2021-11-16 ENCOUNTER — VIRTUAL VISIT (OUTPATIENT)
Dept: UROLOGY | Facility: CLINIC | Age: 71
End: 2021-11-16
Payer: COMMERCIAL

## 2021-11-16 VITALS — HEIGHT: 66 IN | WEIGHT: 132 LBS | BODY MASS INDEX: 21.21 KG/M2

## 2021-11-16 DIAGNOSIS — C67.8 MALIGNANT NEOPLASM OF OVERLAPPING SITES OF BLADDER (H): Primary | ICD-10-CM

## 2021-11-16 PROCEDURE — 99024 POSTOP FOLLOW-UP VISIT: CPT | Performed by: UROLOGY

## 2021-11-16 ASSESSMENT — PAIN SCALES - GENERAL: PAINLEVEL: NO PAIN (0)

## 2021-11-16 ASSESSMENT — MIFFLIN-ST. JEOR: SCORE: 1125.5

## 2021-11-16 NOTE — PATIENT INSTRUCTIONS
Please schedule an appointment for the CT scan to be done this week. Dr. Robles will call with results.    Please follow up with Dr. Robles on 2/15/2022 at any time slot with imaging and labs done prior.     It was a pleasure meeting with you today.  Thank you for allowing me and my team the privilege of caring for you today.  YOU are the reason we are here, and I truly hope we provided you with the excellent service you deserve.  Please let us know if there is anything else we can do for you so that we can be sure you are leaving completely satisfied with your care experience.

## 2021-11-16 NOTE — NURSING NOTE
"Chief Complaint   Patient presents with     Surgical Followup     Distal urethrectomy       Height 1.676 m (5' 6\"), weight 59.9 kg (132 lb), not currently breastfeeding. Body mass index is 21.31 kg/m .    Patient Active Problem List   Diagnosis     Enthesopathy of hip region     Family history of stroke (cerebrovascular)     Trochanteric bursitis     Advanced directives, counseling/discussion     Health Care Home     Hypertension goal BP (blood pressure) < 140/90     Baker's cyst of knee     Patella-femoral syndrome     Adjustment disorder with depressed mood     Essential hypertension     Malignant neoplasm of upper-outer quadrant of left female breast (H)     Encounter for antineoplastic chemotherapy     S/P bilateral mastectomy     Anxiety     Hyperlipidemia LDL goal <130     S/P reverse total shoulder arthroplasty, right     Prophylactic antibiotic for dental procedure indicated due to prior joint replacement     Chronic pain syndrome     Lumbar radiculopathy     Foraminal stenosis of lumbar region     Central stenosis of spinal canal     DDD (degenerative disc disease), lumbar     CKD (chronic kidney disease) stage 3, GFR 30-59 ml/min (H)     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)     Magallanes's neuroma of right foot     Bilateral impacted cerumen     S/P lumbar fusion     Anemia     Carotid stenosis, bilateral L80%, R40%     Carotid artery plaque, bilateral     POSSIBLE Right internal carotid artery aneurysm     Symptomatic stenosis of left carotid artery     Personal history of malignant neoplasm of breast     Acute cystitis with hematuria     Benign paroxysmal positional vertigo, bilateral     Personal history of malignant neoplasm of bladder     Malignant neoplasm of overlapping sites of bladder (H)     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)     Chemotherapy-induced neutropenia (H)     Thrombocytopenia (H)     Anemia in neoplastic disease     " Constipation, unspecified constipation type     Imaging abnormalities       Allergies   Allergen Reactions     Oxybutynin      Patient reports symptoms of nausea and mind fogginess       Current Outpatient Medications   Medication Sig Dispense Refill     acetaminophen (TYLENOL) 500 MG tablet Take 1,000 mg by mouth 3 times daily as needed for mild pain (500MG X 2 = 1,000MG)       ALPRAZolam (XANAX) 0.5 MG tablet TAKE 1 TABLET BY MOUTH DAILY AT BEDTIME AS NEEDED FOR SLEEP (Patient not taking: Reported on 11/2/2021) 90 tablet 0     aspirin (ASA) 81 MG chewable tablet Take 1 tablet (81 mg) by mouth daily 100 tablet 3     atorvastatin (LIPITOR) 20 MG tablet TAKE 1 TABLET BY MOUTH EVERY DAY 90 tablet 1     carvedilol (COREG) 6.25 MG tablet Take 1 tablet (6.25 mg) by mouth 2 times daily (with meals) 60 tablet 11     escitalopram (LEXAPRO) 10 MG tablet TAKE 1 TABLET BY MOUTH EVERY DAY WITH 20MG TABLETS (Patient taking differently: Take 10 mg by mouth daily Takes with 20 mg tablet for total daily dose of 30 mg in the morning.) 90 tablet 1     escitalopram (LEXAPRO) 20 MG tablet TAKE 1 TABLET BY MOUTH EVERY DAY WITH 10MG TABLETS 90 tablet 1     levothyroxine (SYNTHROID/LEVOTHROID) 25 MCG tablet Take 1 tablet (25 mcg) by mouth daily 90 tablet 1     LORazepam (ATIVAN) 0.5 MG tablet Take 1 tablet (0.5 mg) by mouth every 4 hours as needed (Anxiety, Nausea/Vomiting or Sleep) (Patient not taking: Reported on 11/2/2021) 30 tablet 3     meclizine 25 MG CHEW Take 25 mg by mouth every 6 hours as needed for dizziness (Patient not taking: Reported on 11/2/2021)       methocarbamol (ROBAXIN) 750 MG tablet Take 1 tablet (750 mg) by mouth 4 times daily as needed for muscle spasms (or abdominal pain) 30 tablet 0     omeprazole (PRILOSEC) 20 MG DR capsule Take 20 mg by mouth daily       ondansetron (ZOFRAN-ODT) 4 MG ODT tab Take 1 tablet (4 mg) by mouth every 8 hours as needed for nausea (Patient not taking: Reported on 11/2/2021) 30 tablet 0      oxyCODONE-acetaminophen (PERCOCET) 5-325 MG tablet Take 0.5-1 tablets by mouth every 6 hours as needed for severe pain 30 tablet 0     polyethylene glycol (MIRALAX/GLYCOLAX) powder Take 17 g by mouth every morning        prochlorperazine (COMPAZINE) 10 MG tablet TAKE 0.5 TABLETS (5 MG) BY MOUTH EVERY 6 HOURS AS NEEDED (NAUSEA/VOMITING) 30 tablet 3     SENNA PLUS 8.6-50 MG tablet TAKE 2 TABLETS BY MOUTH DAILY AT BEDTIME 100 tablet 0     SENNA-docusate sodium (SENNA S) 8.6-50 MG tablet Take 1 tablet by mouth At Bedtime 30 tablet 0       Social History     Tobacco Use     Smoking status: Former Smoker     Packs/day: 3.00     Years: 10.00     Pack years: 30.00     Types: Cigarettes     Quit date: 1979     Years since quittin.9     Smokeless tobacco: Never Used     Tobacco comment: approx. 3 packs daily   Substance Use Topics     Alcohol use: Yes     Alcohol/week: 0.0 standard drinks     Comment: daily glass of wine     Drug use: No       Ju Carter, VERENICE  2021  2:17 PM

## 2021-11-16 NOTE — PROGRESS NOTES
Hematology/Oncology Visit    Care Team:  - Oncologist: Dr. Sauer  - PCP: Phani Jason PA-C    Reason for visit: exam prior to cycle 1 nivolumab    Diagnosis: left breast cancer and urothelial carcinoma of bladder    Cancer and Treatment Hx:  BREAST CANCER  Sept 2016: presented with left axillary mass, excisional LN biopsy showed metastatic high-grade poorly differentiated carcinoma. MRI breast showed multicentric left sided breast cancer that involved the left lateral breast from the nipple to chest wall. PET without distant mets.  Oct-Nov 2016: completed 4 cycles of neoadjuvant AC then 4 cycles of taxol + herceptin  Feb 8, 2017: bilateral mastectomy for stage 2 ER/SC-, HER2+ left breast cancer. Complete pathologic response with only a 7mm are of DCIS.  May 2017: completed radiation to left chest wall and axilla with Dr. Gonzáles at [facility]  Dec 2017: completed 1 year of herceptin     BLADDER CANCER  Oct 2020: presented with frequent culture negative UTIs and pelvic cramping, sent to a urologist  Feb 2021: cystoscopy with biopsy of bladder dome revealed high grade urothelial cancer without muscle invasion  March 2021: Bladder, left trigone, tumor, trans urethral resection of bladder tumor (TURBT) revealed invasive high-grade urothelial carcinoma with extensive invasion of muscularis propria  April 2021: PET/CT showed likely residual tumor in the left trigone, neoadjuvant chemo with cisplatin split dose then developed TAMIR so treatment plan changed to carboplatin + gemcitabine  June 1, 2021: radical cystectomy and ileal conduit. Pathology revealed invasive high-grade urothelial carcinoma with invasion of the muscularis propria. Urothelial carcinoma in situ present in the periurethral ducts at the urethral origin. Negative margins. Lymph nodes negative for malignancy. [id number]. Dr. Ferguson recommended against adjuvant chemotherapy.  Aug 2021: second opinion with Urology at [facility] with   David  Nov 2021: started nivolumab, plan for 1 year    Interval History:  Michaela has been feeling well. She recovered from her urethral surgery well and has no acute complaints today. She has been walking 3-4 miles per day. She denies any new pain, unintentional weight loss, or breast masses. She received chemo education/documents on nivolumab and also did some reading online. We discussed immunotherapies and mechanism of action as well as common side effects. She has no questions or concerns. She is excited to see her family for Thanksgiving and plans to get her COVID booster after this cycle of treatment.    Medications:  Discussed pertinent medications.    Physical Exam:   GEN: pleasantly conversant female no acute distress  SKIN: accessed IVAD R chest, ileal conduit RLQ with budded red stoma   ENT: eyes non-icteric, no notable oral sores  LYMPH: no notable cervical, supraclavicular, or axillary lymphadenopathy  RESP: clear to auscultation bilaterally, on room air  CARDS: regular rate and rhythm, no notable murmurs   GI: abd soft without notable hepatosplenomegaly, non-tender to palpation  MSK: no notable lower extremity edema  NEURO: alert and oriented without obvious focal deficit    /58   Pulse 91   Temp 97.8  F (36.6  C) (Temporal)   Wt 64 kg (141 lb)   SpO2 97%   BMI 22.76 kg/m       Wt Readings from Last 4 Encounters:   11/18/21 64 kg (141 lb)   11/18/21 64.8 kg (142 lb 14.4 oz)   11/16/21 59.9 kg (132 lb)   11/02/21 59.9 kg (132 lb)     Labs:  Result Value    Sodium 137    Potassium 4.4    Chloride 109    Carbon Dioxide (CO2) 24    Anion Gap 4    Urea Nitrogen 27    Creatinine 0.85    Calcium 9.0    Glucose 77    Alkaline Phosphatase 41    AST 16    ALT 21    Protein Total 7.5    Albumin 3.8    Bilirubin Total 0.3    GFR Estimate 69    TSH 3.58    Magnesium 1.5 (L)    WBC Count 5.8    RBC Count 3.18 (L)    Hemoglobin 10.7 (L)    Platelet Count 254    Absolute Neutrophils 3.8       Assessment  and Plan:  Bladder cancer with invasion of muscle  Macrocytic anemia  - Status post neoadjuvant chemo and surgical resection with ileal conduit June 2021  - Recent imaging demonstrated no evidence of residual or metastatic disease and proximal margin of urethra resected with no residual disease.  - Plan to initiate cycle 1 adjuvant Nivolumab later today, meeting goals and labs appropriate  - Oncology follow up prior to each cycle     Invasive ductal carcinoma left breast, ER/ME-, HER2+  - S/P neoadjuvant chemo, bilateral mastectomy, radiation, and 1 year herceptin in 2017  - No need for mammograms given bilateral mastectomy  - No current clinical evidence of disease recurrence     CKD stage III  - Followed by Dr. Smith in Nephrology  - Fluctuating Cr but stable at 1.02     Incidental findings on CT  - Pelvic fluid consistent with post-op lymphocele  - Per Dr. Robles, repeat CT A/P to assess fluid collection     Constipation  - Maintaining regular BMs with senna and colace BID    Hypomagnesemia  - Mag 1.5, given oral 400mg in infusion, if low at next check then consider daily replacement    Immunization  - Received influenza vaccine today  - Plans to get COVID booster after this cycle of nivolumab      Future Appointments   Date Time Provider Department Center   11/16/2021  2:30 PM Leonardo Robles MD [facility]   11/18/2021  8:30 AM PH INFUSION NURSE [facility]   11/18/2021  9:00 AM Nadia Islas APRN [facility]   11/18/2021 10:30 AM PH INFUSION NURSE [facility]   12/14/2021 12:00 PM PH INFUSION NURSE [facility]   12/14/2021 12:30 PM Girish Sauer MD [facility]   12/15/2021  9:30 AM PH INFUSION CHAIR 14 [facility]   1/11/2022  8:30 AM PH INFUSION NURSE [facility]   1/11/2022  9:00 AM Girish Sauer MD [facility]   1/11/2022  9:30 AM PH INFUSION NURSE [facility]   2/4/2022  9:50 AM [facility]    2/7/2022 10:15 AM PH LAB [facility]   2/8/2022  9:30 AM Akiko Smith DO [facility]   2/8/2022 10:30 AM [facility]   2/8/2022 11:00 AM Girish Sauer MD [facility]   2/8/2022 11:30 AM PH INFUSION CHAIR 8 [facility]   The total time of this encounter amounted to 30 minutes today. This time includes face-to-face time spent with the patient, prep work, ordering tests, and performing post-visit documentation.    Nadia Islas, CNP

## 2021-11-16 NOTE — PROGRESS NOTES
Michaela Dorman is a 71 year old female who is being evaluated via a billable video visit.      How would you like to obtain your AVS? Painting With [employer] Twist[employer]  If the video visit is dropped, the invitation should be resent by: Text to cell phone: [phone]  Will anyone else be joining your video visit? No    Video Start Time: 2:29 PM    CHIEF COMPLAINT   It was my pleasure to see Michaela Dorman who is a 71 year old female for follow-up of bladder cancer.      HPI  Michaela Dorman is a very pleasant 71 year old female who presents with a history of bladder cancer. She underwent cystectomy with ileal conduit 6/1/2021. Stage [id number] disease. Eating well.   Had positive margin at urethral margin noted on initial path, but has now had remnant urethra removed and proximal margin resected, with no residual disease. Recovering well from this.    Urethrectomy 10/27/2021  Final Diagnosis   A.  Distal urethra, excision:  - Benign urethral and periurethral glands with mild chronic inflammation and fibrosis  - Negative for malignancy    B.  Distal urethra, proximal margins, excision:  - Benign muscle and connective tissue with mild fibrosis  - Negative for malignancy     CT chest/abd/pelvis 9/24/2021  IMPRESSION: In this patient with a history of bladder cancer who is  now status post cystectomy and right lower quadrant ileal conduit  creation,  1. No convincing findings to suggest metastatic disease in the chest  abdomen or pelvis. Somewhat limited evaluation of the left mid and  distal ureter due to lack of intervening fat planes and metallic  artifact from patient's posterior instrumented fusion hardware.  Recommend that 7 to 10 minute delayed phase images also be obtained at  the time of interval follow-up.  2. Interval increase in size of circumscribed low-density fluid  collection located within the anterior low pelvis measuring up to 5.3  cm (previously measuring 2.4 cm). Collection appears to be located  within or adjacent to  the sigmoid colonic wall and may represent a  intramural fluid collection versus an adjacent lymphocele associated  with mass effect and intraluminal narrowing of the sigmoid colon.  Inflammatory changes extend anterior from this collection towards the  anterior abdominal wall, possibly near the level of prior port  placement.  3. Mild mural wall thickening and mucosal enhancement of the  rectosigmoid colon, which appears to be located distal to the  above-mentioned fluid collection abutting or adjacent to the sigmoid  colonic wall. Correlate with clinical symptoms of colitis.  4. Moderate atherosclerotic disease particularly at the takeoff of the  superior mesenteric artery suggest clinical correlation for signs of  mesenteric ischemia.  5. Stable esophageal and gastric wall thickening. Recommend  correlation with clinical symptoms.     Cystectomy with ileal conduit 6/1/2021  FINAL DIAGNOSIS:   C. 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 in the periurethral ducts at   urethral margin   - Pathologic stage: [id number]   - Benign uterus, cervix, vaginal wall, ovaries and fallopian tubes with   physiological changes   - See synoptic report          Allergies:   Oxybutynin         Review of Systems:  From intake questionnaire     Skin: negative  Eyes: negative  Ears/Nose/Throat: negative  Respiratory: No shortness of breath, dyspnea on exertion, cough, or hemoptysis  Cardiovascular: No chest pain or palpitations  Gastrointestinal: negative; no nausea/vomiting, constipation or diarrhea  Genitourinary: as per HPI  Musculoskeletal: negative  Neurologic: negative  Psychiatric: negative  Hematologic/Lymphatic/Immunologic: negative  Endocrine: negative         Physical Exam:     Vitals:  No vitals were obtained  today due to virtual visit.    Physical Exam   GENERAL: Healthy, alert and no distress  EYES: Eyes grossly normal to inspection.  No discharge or erythema, or obvious scleral/conjunctival abnormalities.  RESP: No audible wheeze, cough, or visible cyanosis.  No visible retractions or increased work of breathing.    SKIN: Visible skin clear. No significant rash, abnormal pigmentation or lesions.  NEURO: Cranial nerves grossly intact.  Mentation and speech appropriate for age.  PSYCH: Mentation appears normal, affect normal/bright, judgement and insight intact, normal speech and appearance well-groomed.         Assessment and Plan:     71 year old female with stage [id number] bladder cancer s/p radical cystectomy and ileal conduit 6/1/2021. Had remnant urethra removed with no malignancy noted.   Regarding fluid collection in pelvis on CT, suspect this is lymphocele, but will plan to re-image.     Plan:  - CT this week to re-assess fluid collection  - Follow-up 3 months with CT, CXR, and labs    Orders  Orders Placed This Encounter   Procedures     CT Abdomen Pelvis w/Contrast     CT Urogram wo & w Contrast     XR Chest 2 Views     Basic metabolic panel     CBC with platelets     Video-Visit Details    Type of service:  Video Visit    Video End Time:2:40 PM    Originating Location (pt. Location): Home    Distant Location (provider location):   [facility] AND [facility]    Platform used for Video Visit: Vee Robles MD  Urology  [facility]

## 2021-11-18 ENCOUNTER — ONCOLOGY VISIT (OUTPATIENT)
Dept: ONCOLOGY | Facility: CLINIC | Age: 71
End: 2021-11-18
Payer: COMMERCIAL

## 2021-11-18 ENCOUNTER — LAB (OUTPATIENT)
Dept: INFUSION THERAPY | Facility: CLINIC | Age: 71
End: 2021-11-18
Attending: INTERNAL MEDICINE
Payer: MEDICARE

## 2021-11-18 ENCOUNTER — HOSPITAL ENCOUNTER (OUTPATIENT)
Dept: CT IMAGING | Facility: CLINIC | Age: 71
End: 2021-11-18
Attending: UROLOGY
Payer: MEDICARE

## 2021-11-18 VITALS
SYSTOLIC BLOOD PRESSURE: 100 MMHG | OXYGEN SATURATION: 97 % | HEART RATE: 91 BPM | TEMPERATURE: 97.8 F | WEIGHT: 141 LBS | BODY MASS INDEX: 22.76 KG/M2 | DIASTOLIC BLOOD PRESSURE: 58 MMHG

## 2021-11-18 VITALS
WEIGHT: 141.09 LBS | TEMPERATURE: 97.4 F | RESPIRATION RATE: 16 BRPM | SYSTOLIC BLOOD PRESSURE: 117 MMHG | OXYGEN SATURATION: 97 % | HEIGHT: 66 IN | HEART RATE: 70 BPM | DIASTOLIC BLOOD PRESSURE: 64 MMHG | BODY MASS INDEX: 22.68 KG/M2

## 2021-11-18 VITALS — BODY MASS INDEX: 22.97 KG/M2 | WEIGHT: 142.9 LBS | HEIGHT: 66 IN

## 2021-11-18 DIAGNOSIS — Z17.1 MALIGNANT NEOPLASM OF UPPER-OUTER QUADRANT OF LEFT BREAST IN FEMALE, ESTROGEN RECEPTOR NEGATIVE (H): ICD-10-CM

## 2021-11-18 DIAGNOSIS — C67.9 UROTHELIAL CARCINOMA OF BLADDER WITH INVASION OF MUSCLE (H): ICD-10-CM

## 2021-11-18 DIAGNOSIS — D64.9 ANEMIA, UNSPECIFIED TYPE: ICD-10-CM

## 2021-11-18 DIAGNOSIS — C67.9 UROTHELIAL CARCINOMA OF BLADDER WITH INVASION OF MUSCLE (H): Primary | ICD-10-CM

## 2021-11-18 DIAGNOSIS — E83.42 HYPOMAGNESEMIA: ICD-10-CM

## 2021-11-18 DIAGNOSIS — Z51.11 ENCOUNTER FOR ANTINEOPLASTIC CHEMOTHERAPY: ICD-10-CM

## 2021-11-18 DIAGNOSIS — C67.8 MALIGNANT NEOPLASM OF OVERLAPPING SITES OF BLADDER (H): Primary | ICD-10-CM

## 2021-11-18 DIAGNOSIS — Z23 NEED FOR PROPHYLACTIC VACCINATION AND INOCULATION AGAINST INFLUENZA: ICD-10-CM

## 2021-11-18 DIAGNOSIS — N18.31 STAGE 3A CHRONIC KIDNEY DISEASE (H): ICD-10-CM

## 2021-11-18 DIAGNOSIS — C50.412 MALIGNANT NEOPLASM OF UPPER-OUTER QUADRANT OF LEFT BREAST IN FEMALE, ESTROGEN RECEPTOR NEGATIVE (H): ICD-10-CM

## 2021-11-18 DIAGNOSIS — C50.412 MALIGNANT NEOPLASM OF UPPER-OUTER QUADRANT OF LEFT FEMALE BREAST, UNSPECIFIED ESTROGEN RECEPTOR STATUS (H): ICD-10-CM

## 2021-11-18 DIAGNOSIS — R93.89 IMAGING ABNORMALITIES: ICD-10-CM

## 2021-11-18 DIAGNOSIS — K59.00 CONSTIPATION, UNSPECIFIED CONSTIPATION TYPE: ICD-10-CM

## 2021-11-18 DIAGNOSIS — C67.8 MALIGNANT NEOPLASM OF OVERLAPPING SITES OF BLADDER (H): ICD-10-CM

## 2021-11-18 LAB
ALBUMIN SERPL-MCNC: 3.8 G/DL (ref 3.4–5)
ALP SERPL-CCNC: 41 U/L (ref 40–150)
ALT SERPL W P-5'-P-CCNC: 21 U/L (ref 0–50)
ANION GAP SERPL CALCULATED.3IONS-SCNC: 4 MMOL/L (ref 3–14)
AST SERPL W P-5'-P-CCNC: 16 U/L (ref 0–45)
BASOPHILS # BLD AUTO: 0 10E3/UL (ref 0–0.2)
BASOPHILS NFR BLD AUTO: 1 %
BILIRUB SERPL-MCNC: 0.3 MG/DL (ref 0.2–1.3)
BUN SERPL-MCNC: 27 MG/DL (ref 7–30)
CALCIUM SERPL-MCNC: 9 MG/DL (ref 8.5–10.1)
CHLORIDE BLD-SCNC: 109 MMOL/L (ref 94–109)
CO2 SERPL-SCNC: 24 MMOL/L (ref 20–32)
CREAT BLD-MCNC: 1.1 MG/DL (ref 0.5–1)
CREAT SERPL-MCNC: 0.85 MG/DL (ref 0.52–1.04)
EOSINOPHIL # BLD AUTO: 0.1 10E3/UL (ref 0–0.7)
EOSINOPHIL NFR BLD AUTO: 2 %
ERYTHROCYTE [DISTWIDTH] IN BLOOD BY AUTOMATED COUNT: 15.4 % (ref 10–15)
GFR SERPL CREATININE-BSD FRML MDRD: 50 ML/MIN/1.73M2
GFR SERPL CREATININE-BSD FRML MDRD: 69 ML/MIN/1.73M2
GLUCOSE BLD-MCNC: 77 MG/DL (ref 70–99)
HCT VFR BLD AUTO: 33.5 % (ref 35–47)
HGB BLD-MCNC: 10.7 G/DL (ref 11.7–15.7)
IMM GRANULOCYTES # BLD: 0 10E3/UL
IMM GRANULOCYTES NFR BLD: 0 %
LYMPHOCYTES # BLD AUTO: 1.3 10E3/UL (ref 0.8–5.3)
LYMPHOCYTES NFR BLD AUTO: 23 %
MAGNESIUM SERPL-MCNC: 1.5 MG/DL (ref 1.6–2.3)
MCH RBC QN AUTO: 33.6 PG (ref 26.5–33)
MCHC RBC AUTO-ENTMCNC: 31.9 G/DL (ref 31.5–36.5)
MCV RBC AUTO: 105 FL (ref 78–100)
MONOCYTES # BLD AUTO: 0.5 10E3/UL (ref 0–1.3)
MONOCYTES NFR BLD AUTO: 8 %
NEUTROPHILS # BLD AUTO: 3.8 10E3/UL (ref 1.6–8.3)
NEUTROPHILS NFR BLD AUTO: 66 %
NRBC # BLD AUTO: 0 10E3/UL
NRBC BLD AUTO-RTO: 0 /100
PLATELET # BLD AUTO: 254 10E3/UL (ref 150–450)
POTASSIUM BLD-SCNC: 4.4 MMOL/L (ref 3.4–5.3)
PROT SERPL-MCNC: 7.5 G/DL (ref 6.8–8.8)
RBC # BLD AUTO: 3.18 10E6/UL (ref 3.8–5.2)
SODIUM SERPL-SCNC: 137 MMOL/L (ref 133–144)
TSH SERPL DL<=0.005 MIU/L-ACNC: 3.58 MU/L (ref 0.4–4)
WBC # BLD AUTO: 5.8 10E3/UL (ref 4–11)

## 2021-11-18 PROCEDURE — 250N000009 HC RX 250: Performed by: RADIOLOGY

## 2021-11-18 PROCEDURE — 250N000011 HC RX IP 250 OP 636: Performed by: RADIOLOGY

## 2021-11-18 PROCEDURE — 84443 ASSAY THYROID STIM HORMONE: CPT | Performed by: INTERNAL MEDICINE

## 2021-11-18 PROCEDURE — 99214 OFFICE O/P EST MOD 30 MIN: CPT | Mod: 25 | Performed by: NURSE PRACTITIONER

## 2021-11-18 PROCEDURE — 82565 ASSAY OF CREATININE: CPT

## 2021-11-18 PROCEDURE — 85025 COMPLETE CBC W/AUTO DIFF WBC: CPT

## 2021-11-18 PROCEDURE — 258N000003 HC RX IP 258 OP 636: Performed by: INTERNAL MEDICINE

## 2021-11-18 PROCEDURE — 96413 CHEMO IV INFUSION 1 HR: CPT

## 2021-11-18 PROCEDURE — 74177 CT ABD & PELVIS W/CONTRAST: CPT

## 2021-11-18 PROCEDURE — 90662 IIV NO PRSV INCREASED AG IM: CPT | Performed by: NURSE PRACTITIONER

## 2021-11-18 PROCEDURE — 250N000011 HC RX IP 250 OP 636: Performed by: INTERNAL MEDICINE

## 2021-11-18 PROCEDURE — 83735 ASSAY OF MAGNESIUM: CPT

## 2021-11-18 PROCEDURE — 250N000013 HC RX MED GY IP 250 OP 250 PS 637: Performed by: NURSE PRACTITIONER

## 2021-11-18 PROCEDURE — 36591 DRAW BLOOD OFF VENOUS DEVICE: CPT | Performed by: INTERNAL MEDICINE

## 2021-11-18 PROCEDURE — G0008 ADMIN INFLUENZA VIRUS VAC: HCPCS | Performed by: NURSE PRACTITIONER

## 2021-11-18 PROCEDURE — 82040 ASSAY OF SERUM ALBUMIN: CPT | Performed by: INTERNAL MEDICINE

## 2021-11-18 RX ORDER — IOPAMIDOL 755 MG/ML
500 INJECTION, SOLUTION INTRAVASCULAR ONCE
Status: COMPLETED | OUTPATIENT
Start: 2021-11-18 | End: 2021-11-18

## 2021-11-18 RX ORDER — PROCHLORPERAZINE MALEATE 10 MG
TABLET ORAL
Qty: 30 TABLET | Refills: 2 | Status: SHIPPED | OUTPATIENT
Start: 2021-11-18 | End: 2022-02-21

## 2021-11-18 RX ORDER — MAGNESIUM SULFATE HEPTAHYDRATE 40 MG/ML
2 INJECTION, SOLUTION INTRAVENOUS ONCE
Status: DISCONTINUED | OUTPATIENT
Start: 2021-11-18 | End: 2021-11-18

## 2021-11-18 RX ORDER — HEPARIN SODIUM (PORCINE) LOCK FLUSH IV SOLN 100 UNIT/ML 100 UNIT/ML
5 SOLUTION INTRAVENOUS
Status: DISCONTINUED | OUTPATIENT
Start: 2021-11-18 | End: 2021-11-18 | Stop reason: HOSPADM

## 2021-11-18 RX ORDER — MAGNESIUM OXIDE 400 MG/1
400 TABLET ORAL ONCE
Status: COMPLETED | OUTPATIENT
Start: 2021-11-18 | End: 2021-11-18

## 2021-11-18 RX ADMIN — Medication 400 MG: at 10:15

## 2021-11-18 RX ADMIN — SODIUM CHLORIDE 250 ML: 9 INJECTION, SOLUTION INTRAVENOUS at 10:01

## 2021-11-18 RX ADMIN — IOPAMIDOL 100 ML: 755 INJECTION, SOLUTION INTRAVENOUS at 11:52

## 2021-11-18 RX ADMIN — Medication 5 ML: at 12:10

## 2021-11-18 RX ADMIN — SODIUM CHLORIDE 100 ML: 9 INJECTION, SOLUTION INTRAVENOUS at 11:51

## 2021-11-18 RX ADMIN — SODIUM CHLORIDE 480 MG: 9 INJECTION, SOLUTION INTRAVENOUS at 10:34

## 2021-11-18 ASSESSMENT — MIFFLIN-ST. JEOR
SCORE: 1174.94
SCORE: 1166.75

## 2021-11-18 ASSESSMENT — PAIN SCALES - GENERAL: PAINLEVEL: NO PAIN (0)

## 2021-11-18 NOTE — LETTER
11/18/2021         RE: Michaela Dorman  [address]  [facility] [address]      Hematology/Oncology Visit    Care Team:  - Oncologist: Dr. Sauer  - PCP: Phani Jason PA-C    Reason for visit: exam prior to cycle 1 nivolumab    Diagnosis: left breast cancer and urothelial carcinoma of bladder    Cancer and Treatment Hx:  BREAST CANCER  Sept 2016: presented with left axillary mass, excisional LN biopsy showed metastatic high-grade poorly differentiated carcinoma. MRI breast showed multicentric left sided breast cancer that involved the left lateral breast from the nipple to chest wall. PET without distant mets.  Oct-Nov 2016: completed 4 cycles of neoadjuvant AC then 4 cycles of taxol + herceptin  Feb 8, 2017: bilateral mastectomy for stage 2 ER/ME-, HER2+ left breast cancer. Complete pathologic response with only a 7mm are of DCIS.  May 2017: completed radiation to left chest wall and axilla with Dr. Gonzáles at [facility]  Dec 2017: completed 1 year of herceptin     BLADDER CANCER  Oct 2020: presented with frequent culture negative UTIs and pelvic cramping, sent to a urologist  Feb 2021: cystoscopy with biopsy of bladder dome revealed high grade urothelial cancer without muscle invasion  March 2021: Bladder, left trigone, tumor, trans urethral resection of bladder tumor (TURBT) revealed invasive high-grade urothelial carcinoma with extensive invasion of muscularis propria  April 2021: PET/CT showed likely residual tumor in the left trigone, neoadjuvant chemo with cisplatin split dose then developed TAMIR so treatment plan changed to carboplatin + gemcitabine  June 1, 2021: radical cystectomy and ileal conduit. Pathology revealed invasive high-grade urothelial carcinoma with invasion of the muscularis propria. Urothelial carcinoma in situ present in the periurethral ducts at the urethral origin. Negative margins. Lymph nodes negative for malignancy. [id number]. Dr. Ferguson recommended against  adjuvant chemotherapy.  Aug 2021: second opinion with Urology at [facility] with Dr. Hernandez  Nov 2021: started nivolumab, plan for 1 year    Interval History:  Michaela has been feeling well. She recovered from her urethral surgery well and has no acute complaints today. She has been walking 3-4 miles per day. She denies any new pain, unintentional weight loss, or breast masses. She received chemo education/documents on nivolumab and also did some reading online. We discussed immunotherapies and mechanism of action as well as common side effects. She has no questions or concerns. She is excited to see her family for Thanksgiving and plans to get her COVID booster after this cycle of treatment.    Medications:  Discussed pertinent medications.    Physical Exam:   GEN: pleasantly conversant female no acute distress  SKIN: accessed IVAD R chest, ileal conduit RLQ with budded red stoma   ENT: eyes non-icteric, no notable oral sores  LYMPH: no notable cervical, supraclavicular, or axillary lymphadenopathy  RESP: clear to auscultation bilaterally, on room air  CARDS: regular rate and rhythm, no notable murmurs   GI: abd soft without notable hepatosplenomegaly, non-tender to palpation  MSK: no notable lower extremity edema  NEURO: alert and oriented without obvious focal deficit    /58   Pulse 91   Temp 97.8  F (36.6  C) (Temporal)   Wt 64 kg (141 lb)   SpO2 97%   BMI 22.76 kg/m       Wt Readings from Last 4 Encounters:   11/18/21 64 kg (141 lb)   11/18/21 64.8 kg (142 lb 14.4 oz)   11/16/21 59.9 kg (132 lb)   11/02/21 59.9 kg (132 lb)     Labs:  Result Value    Sodium 137    Potassium 4.4    Chloride 109    Carbon Dioxide (CO2) 24    Anion Gap 4    Urea Nitrogen 27    Creatinine 0.85    Calcium 9.0    Glucose 77    Alkaline Phosphatase 41    AST 16    ALT 21    Protein Total 7.5    Albumin 3.8    Bilirubin Total 0.3    GFR Estimate 69    TSH 3.58    Magnesium 1.5 (L)    WBC Count 5.8    RBC Count 3.18 (L)     Hemoglobin 10.7 (L)    Platelet Count 254    Absolute Neutrophils 3.8       Assessment and Plan:  Bladder cancer with invasion of muscle  Macrocytic anemia  - Status post neoadjuvant chemo and surgical resection with ileal conduit June 2021  - Recent imaging demonstrated no evidence of residual or metastatic disease and proximal margin of urethra resected with no residual disease.  - Plan to initiate cycle 1 adjuvant Nivolumab later today, meeting goals and labs appropriate  - Oncology follow up prior to each cycle     Invasive ductal carcinoma left breast, ER/OR-, HER2+  - S/P neoadjuvant chemo, bilateral mastectomy, radiation, and 1 year herceptin in 2017  - No need for mammograms given bilateral mastectomy  - No current clinical evidence of disease recurrence     CKD stage III  - Followed by Dr. Smith in Nephrology  - Fluctuating Cr but stable at 1.02     Incidental findings on CT  - Pelvic fluid consistent with post-op lymphocele  - Per Dr. Robles, repeat CT A/P to assess fluid collection     Constipation  - Maintaining regular BMs with senna and colace BID    Hypomagnesemia  - Mag 1.5, given oral 400mg in infusion, if low at next check then consider daily replacement    Immunization  - Received influenza vaccine today  - Plans to get COVID booster after this cycle of nivolumab      Future Appointments   Date Time Provider Department Center   11/16/2021  2:30 PM Leonardo Robles MD [facility]   11/18/2021  8:30 AM PH INFUSION NURSE [facility]   11/18/2021  9:00 AM Nadia Islas APRN [facility]   11/18/2021 10:30 AM PH INFUSION NURSE [facility]   12/14/2021 12:00 PM PH INFUSION NURSE [facility]   12/14/2021 12:30 PM Girish Sauer MD [facility]   12/15/2021  9:30 AM PH INFUSION CHAIR 14 [facility]   1/11/2022  8:30 AM PH INFUSION NURSE [facility]   1/11/2022  9:00 AM Girish Sauer MD [facility]   1/11/2022   9:30 AM PH INFUSION NURSE [facility]   2/4/2022  9:50 AM PHCT1 PHCT [facility]   2/7/2022 10:15 AM PH LAB [facility]   2/8/2022  9:30 AM Akiko Smith,  [facility]VARADO [place]   2/8/2022 10:30 AM PH INFUSION NURSE [facility]   2/8/2022 11:00 AM Girish Sauer MD [facility]   2/8/2022 11:30 AM PH INFUSION CHAIR 8 [facility]   The total time of this encounter amounted to 30 minutes today. This time includes face-to-face time spent with the patient, prep work, ordering tests, and performing post-visit documentation.    Nadia Islas CNP      DISCHARGE PLAN:  Next appointments: See patient instruction section  Departure Mode: Ambulatory  Accompanied by: self  0 minutes for nursing discharge (face to face time)     Michaela Dorman is here today for Oncology follow up.  Patient was not seen by writing nurse at time of appointment. Patient to continue with treatment with follow up as scheduled with labs and treatment. Flu shot given per MA. Patient to infusion. See patient instructions and Oncologist's Progress note for further details. Questions and concerns addressed to patient's satisfaction. Patient verbalized and demonstrated understanding of plan.  Contact information provided and patient is encouraged to call with any that arise in the interim of care.    Juan José Hernandez, RN, BSN, OCN   Oncology Care Coordinator RN  [facility]  [phone]  11/18/2021, 10:01 AM              LISA Damico CNP

## 2021-11-18 NOTE — PROGRESS NOTES
Infusion Nursing Note:  Michaela Dorman presents today for Port Labs.    Patient seen by provider today: No   present during visit today: Not Applicable.    Note: N/A.      Intravenous Access:  Labs drawn without difficulty.  Implanted Port.    Treatment Conditions:  Not Applicable.      Post Infusion Assessment:  NA.       Discharge Plan:   Awaiting possible treatment today after MD visit.      Mirian Peralta RN

## 2021-11-18 NOTE — PROGRESS NOTES
Infusion Nursing Note:  Michaela KENYON Samanthagricelda presents today for C1 D1 Opdivo.    Patient seen by provider today: Yes: Nadia GONZALEZ CNP   present during visit today: Not Applicable.    Note: All questions answered regarding C1 D1. Magnesium slightly low at 1.5, Nadia aware and ordered one dose of Mag Ox 400 mg today. Will follow up with next treatment labs. Patient tolerated Opdivo well today.      Intravenous Access:  Implanted Port.    Treatment Conditions:  Lab Results   Component Value Date    HGB 10.7 (L) 11/18/2021    WBC 5.8 11/18/2021    ANEU 3.5 06/01/2021    ANEUTAUTO 3.8 11/18/2021     11/18/2021      Lab Results   Component Value Date     11/18/2021    POTASSIUM 4.4 11/18/2021    MAG 1.5 (L) 11/18/2021    CR 1.1 (H) 11/18/2021    VANDANA 9.0 11/18/2021    BILITOTAL 0.3 11/18/2021    ALBUMIN 3.8 11/18/2021    ALT 21 11/18/2021    AST 16 11/18/2021     Results reviewed, labs MET treatment parameters, ok to proceed with treatment.  Mag-1.5, replaced.      Post Infusion Assessment:  Patient tolerated infusion without incident.  Patient observed for 15 minutes post infusion per protocol.  Blood return noted pre and post infusion.  Site patent and intact, free from redness, edema or discomfort.  No evidence of extravasations.  Access discontinued per protocol.       Discharge Plan:   Discharge instructions reviewed with: Patient.  Patient and/or family verbalized understanding of discharge instructions and all questions answered.  Patient discharged in stable condition accompanied by: self.  Departure Mode: Ambulatory.      Mirian Peralta RN

## 2021-11-18 NOTE — PROGRESS NOTES
DISCHARGE PLAN:  Next appointments: See patient instruction section  Departure Mode: Ambulatory  Accompanied by: self  0 minutes for nursing discharge (face to face time)     Michaela Dorman is here today for Oncology follow up.  Patient was not seen by writing nurse at time of appointment. Patient to continue with treatment with follow up as scheduled with labs and treatment. Flu shot given per MA. Patient to infusion. See patient instructions and Oncologist's Progress note for further details. Questions and concerns addressed to patient's satisfaction. Patient verbalized and demonstrated understanding of plan.  Contact information provided and patient is encouraged to call with any that arise in the interim of care.    Juan José Hernandez, RN, BSN, OCN   Oncology Care Coordinator RN  [facility]  [phone]  11/18/2021, 10:01 AM

## 2021-11-18 NOTE — PATIENT INSTRUCTIONS
Today:  Continue with treatment. Flu shot today.    Follow up as scheduled.    If you have any questions or concerns please feel free to call.    If you need to reschedule please call:  Clinic or Lab Appointment - [phone]  Infusion - [phone]  Imaging - [phone]    Juan José Hernandez RN, BSN, OCN  [facility]   Oncology/Hematology Care Coordinator RN  [facility]  [phone]    After Hours Oncology Nurse Line - [phone]

## 2021-11-25 DIAGNOSIS — C67.9 UROTHELIAL CARCINOMA OF BLADDER WITH INVASION OF MUSCLE (H): ICD-10-CM

## 2021-11-26 RX ORDER — DOCUSATE SODIUM AND SENNOSIDES 8.6; 5 MG/1; MG/1
TABLET, FILM COATED ORAL
Qty: 100 TABLET | Refills: 0 | Status: SHIPPED | OUTPATIENT
Start: 2021-11-26 | End: 2022-09-12

## 2021-11-26 NOTE — TELEPHONE ENCOUNTER
"Requested Prescriptions   Pending Prescriptions Disp Refills     STOOL SOFTENER/LAXATIVE 50-8.6 MG tablet [Pharmacy Med Name: STOOL SOFTENER WITH LAX TABLET] 100 tablet 0     Sig: TAKE 2 TABLETS BY MOUTH DAILY AT BEDTIME       Laxatives Protocol Passed - 11/25/2021  9:44 AM        Passed - Patient is age 6 or older        Passed - Recent (12 mo) or future (30 days) visit within the authorizing provider's specialty     Patient has had an office visit with the authorizing provider or a provider within the authorizing providers department within the previous 12 mos or has a future within next 30 days. See \"Patient Info\" tab in inbasket, or \"Choose Columns\" in Meds & Orders section of the refill encounter.              Passed - Medication is active on med list             Prescription approved per [facility] Refill Protocol.    MARY GRACE MenonN, RN    "

## 2021-12-06 DIAGNOSIS — F41.9 ANXIETY: ICD-10-CM

## 2021-12-06 DIAGNOSIS — F43.21 ADJUSTMENT DISORDER WITH DEPRESSED MOOD: ICD-10-CM

## 2021-12-06 RX ORDER — ESCITALOPRAM OXALATE 20 MG/1
TABLET ORAL
Qty: 90 TABLET | Refills: 0 | Status: SHIPPED | OUTPATIENT
Start: 2021-12-06 | End: 2022-05-09 | Stop reason: ALTCHOICE

## 2021-12-06 RX ORDER — ALPRAZOLAM 0.5 MG
TABLET ORAL
Qty: 90 TABLET | Refills: 0 | Status: SHIPPED | OUTPATIENT
Start: 2021-12-06 | End: 2022-01-11

## 2021-12-07 NOTE — TELEPHONE ENCOUNTER
Sent V[employer]owik[employer] message advising a one-time norma refill, an appointment for an office visit is needed for future refills.

## 2021-12-13 ENCOUNTER — MYC MEDICAL ADVICE (OUTPATIENT)
Dept: FAMILY MEDICINE | Facility: OTHER | Age: 71
End: 2021-12-13
Payer: COMMERCIAL

## 2021-12-13 DIAGNOSIS — F10.10 ETOH ABUSE: Primary | ICD-10-CM

## 2021-12-13 RX ORDER — NALTREXONE HYDROCHLORIDE 50 MG/1
50 TABLET, FILM COATED ORAL DAILY
Qty: 30 TABLET | Refills: 1 | Status: SHIPPED | OUTPATIENT
Start: 2021-12-13 | End: 2022-02-07

## 2021-12-13 RX ORDER — NIVOLUMAB 10 MG/ML
INJECTION INTRAVENOUS
COMMUNITY
Start: 2021-12-13 | End: 2022-09-08

## 2021-12-14 ENCOUNTER — LAB (OUTPATIENT)
Dept: INFUSION THERAPY | Facility: CLINIC | Age: 71
End: 2021-12-14
Attending: INTERNAL MEDICINE
Payer: MEDICARE

## 2021-12-14 ENCOUNTER — VIRTUAL VISIT (OUTPATIENT)
Dept: ONCOLOGY | Facility: CLINIC | Age: 71
End: 2021-12-14
Payer: COMMERCIAL

## 2021-12-14 VITALS
SYSTOLIC BLOOD PRESSURE: 110 MMHG | BODY MASS INDEX: 22.74 KG/M2 | DIASTOLIC BLOOD PRESSURE: 66 MMHG | WEIGHT: 141.5 LBS | RESPIRATION RATE: 18 BRPM | HEIGHT: 66 IN | TEMPERATURE: 96.9 F | OXYGEN SATURATION: 99 % | HEART RATE: 93 BPM

## 2021-12-14 DIAGNOSIS — Z51.11 ENCOUNTER FOR ANTINEOPLASTIC CHEMOTHERAPY: ICD-10-CM

## 2021-12-14 DIAGNOSIS — E83.42 HYPOMAGNESEMIA: ICD-10-CM

## 2021-12-14 DIAGNOSIS — C50.412 MALIGNANT NEOPLASM OF UPPER-OUTER QUADRANT OF LEFT BREAST IN FEMALE, ESTROGEN RECEPTOR NEGATIVE (H): ICD-10-CM

## 2021-12-14 DIAGNOSIS — C67.8 MALIGNANT NEOPLASM OF OVERLAPPING SITES OF BLADDER (H): Primary | ICD-10-CM

## 2021-12-14 DIAGNOSIS — L30.9 DERMATITIS: ICD-10-CM

## 2021-12-14 DIAGNOSIS — Z17.1 MALIGNANT NEOPLASM OF UPPER-OUTER QUADRANT OF LEFT BREAST IN FEMALE, ESTROGEN RECEPTOR NEGATIVE (H): ICD-10-CM

## 2021-12-14 DIAGNOSIS — R68.84 JAW PAIN: ICD-10-CM

## 2021-12-14 LAB
ALBUMIN SERPL-MCNC: 3.9 G/DL (ref 3.4–5)
ALP SERPL-CCNC: 58 U/L (ref 40–150)
ALT SERPL W P-5'-P-CCNC: 36 U/L (ref 0–50)
ANION GAP SERPL CALCULATED.3IONS-SCNC: 4 MMOL/L (ref 3–14)
AST SERPL W P-5'-P-CCNC: 22 U/L (ref 0–45)
BILIRUB SERPL-MCNC: 0.2 MG/DL (ref 0.2–1.3)
BUN SERPL-MCNC: 22 MG/DL (ref 7–30)
CALCIUM SERPL-MCNC: 9.7 MG/DL (ref 8.5–10.1)
CHLORIDE BLD-SCNC: 110 MMOL/L (ref 94–109)
CO2 SERPL-SCNC: 25 MMOL/L (ref 20–32)
CREAT SERPL-MCNC: 0.91 MG/DL (ref 0.52–1.04)
GFR SERPL CREATININE-BSD FRML MDRD: 63 ML/MIN/1.73M2
GLUCOSE BLD-MCNC: 86 MG/DL (ref 70–99)
POTASSIUM BLD-SCNC: 4.2 MMOL/L (ref 3.4–5.3)
PROT SERPL-MCNC: 7.5 G/DL (ref 6.8–8.8)
SODIUM SERPL-SCNC: 139 MMOL/L (ref 133–144)
TSH SERPL DL<=0.005 MIU/L-ACNC: 3.36 MU/L (ref 0.4–4)

## 2021-12-14 PROCEDURE — 36591 DRAW BLOOD OFF VENOUS DEVICE: CPT

## 2021-12-14 PROCEDURE — 80053 COMPREHEN METABOLIC PANEL: CPT | Performed by: INTERNAL MEDICINE

## 2021-12-14 PROCEDURE — 99214 OFFICE O/P EST MOD 30 MIN: CPT | Performed by: INTERNAL MEDICINE

## 2021-12-14 PROCEDURE — 84443 ASSAY THYROID STIM HORMONE: CPT | Performed by: INTERNAL MEDICINE

## 2021-12-14 PROCEDURE — 250N000011 HC RX IP 250 OP 636: Performed by: INTERNAL MEDICINE

## 2021-12-14 RX ORDER — NALOXONE HYDROCHLORIDE 0.4 MG/ML
0.2 INJECTION, SOLUTION INTRAMUSCULAR; INTRAVENOUS; SUBCUTANEOUS
Status: CANCELLED | OUTPATIENT
Start: 2021-12-15

## 2021-12-14 RX ORDER — HEPARIN SODIUM (PORCINE) LOCK FLUSH IV SOLN 100 UNIT/ML 100 UNIT/ML
500 SOLUTION INTRAVENOUS ONCE
Status: COMPLETED | OUTPATIENT
Start: 2021-12-14 | End: 2021-12-14

## 2021-12-14 RX ORDER — ALBUTEROL SULFATE 0.83 MG/ML
2.5 SOLUTION RESPIRATORY (INHALATION)
Status: CANCELLED | OUTPATIENT
Start: 2021-12-15

## 2021-12-14 RX ORDER — METHYLPREDNISOLONE SODIUM SUCCINATE 125 MG/2ML
125 INJECTION, POWDER, LYOPHILIZED, FOR SOLUTION INTRAMUSCULAR; INTRAVENOUS
Status: CANCELLED
Start: 2021-12-15

## 2021-12-14 RX ORDER — DIPHENHYDRAMINE HYDROCHLORIDE 50 MG/ML
50 INJECTION INTRAMUSCULAR; INTRAVENOUS
Status: CANCELLED
Start: 2022-01-12

## 2021-12-14 RX ORDER — EPINEPHRINE 1 MG/ML
0.3 INJECTION, SOLUTION, CONCENTRATE INTRAVENOUS EVERY 5 MIN PRN
Status: CANCELLED | OUTPATIENT
Start: 2021-12-15

## 2021-12-14 RX ORDER — DIPHENHYDRAMINE HYDROCHLORIDE 50 MG/ML
50 INJECTION INTRAMUSCULAR; INTRAVENOUS
Status: CANCELLED
Start: 2021-12-15

## 2021-12-14 RX ORDER — EPINEPHRINE 1 MG/ML
0.3 INJECTION, SOLUTION, CONCENTRATE INTRAVENOUS EVERY 5 MIN PRN
Status: CANCELLED | OUTPATIENT
Start: 2022-01-12

## 2021-12-14 RX ORDER — LORAZEPAM 2 MG/ML
0.5 INJECTION INTRAMUSCULAR EVERY 4 HOURS PRN
Status: CANCELLED | OUTPATIENT
Start: 2021-12-15

## 2021-12-14 RX ORDER — ALBUTEROL SULFATE 90 UG/1
1-2 AEROSOL, METERED RESPIRATORY (INHALATION)
Status: CANCELLED
Start: 2021-12-15

## 2021-12-14 RX ORDER — HEPARIN SODIUM,PORCINE 10 UNIT/ML
5 VIAL (ML) INTRAVENOUS
Status: CANCELLED | OUTPATIENT
Start: 2021-12-15

## 2021-12-14 RX ORDER — HEPARIN SODIUM (PORCINE) LOCK FLUSH IV SOLN 100 UNIT/ML 100 UNIT/ML
5 SOLUTION INTRAVENOUS
Status: CANCELLED | OUTPATIENT
Start: 2022-01-12

## 2021-12-14 RX ORDER — HEPARIN SODIUM,PORCINE 10 UNIT/ML
5 VIAL (ML) INTRAVENOUS
Status: CANCELLED | OUTPATIENT
Start: 2022-01-12

## 2021-12-14 RX ORDER — HEPARIN SODIUM (PORCINE) LOCK FLUSH IV SOLN 100 UNIT/ML 100 UNIT/ML
5 SOLUTION INTRAVENOUS
Status: CANCELLED | OUTPATIENT
Start: 2021-12-15

## 2021-12-14 RX ORDER — LORAZEPAM 2 MG/ML
0.5 INJECTION INTRAMUSCULAR EVERY 4 HOURS PRN
Status: CANCELLED | OUTPATIENT
Start: 2022-01-12

## 2021-12-14 RX ORDER — ALBUTEROL SULFATE 90 UG/1
1-2 AEROSOL, METERED RESPIRATORY (INHALATION)
Status: CANCELLED
Start: 2022-01-12

## 2021-12-14 RX ORDER — NALOXONE HYDROCHLORIDE 0.4 MG/ML
0.2 INJECTION, SOLUTION INTRAMUSCULAR; INTRAVENOUS; SUBCUTANEOUS
Status: CANCELLED | OUTPATIENT
Start: 2022-01-12

## 2021-12-14 RX ORDER — MEPERIDINE HYDROCHLORIDE 25 MG/ML
25 INJECTION INTRAMUSCULAR; INTRAVENOUS; SUBCUTANEOUS EVERY 30 MIN PRN
Status: CANCELLED | OUTPATIENT
Start: 2022-01-12

## 2021-12-14 RX ORDER — MEPERIDINE HYDROCHLORIDE 25 MG/ML
25 INJECTION INTRAMUSCULAR; INTRAVENOUS; SUBCUTANEOUS EVERY 30 MIN PRN
Status: CANCELLED | OUTPATIENT
Start: 2021-12-15

## 2021-12-14 RX ORDER — ALBUTEROL SULFATE 0.83 MG/ML
2.5 SOLUTION RESPIRATORY (INHALATION)
Status: CANCELLED | OUTPATIENT
Start: 2022-01-12

## 2021-12-14 RX ORDER — METHYLPREDNISOLONE SODIUM SUCCINATE 125 MG/2ML
125 INJECTION, POWDER, LYOPHILIZED, FOR SOLUTION INTRAMUSCULAR; INTRAVENOUS
Status: CANCELLED
Start: 2022-01-12

## 2021-12-14 RX ADMIN — Medication 500 UNITS: at 12:21

## 2021-12-14 ASSESSMENT — MIFFLIN-ST. JEOR: SCORE: 1160.65

## 2021-12-14 ASSESSMENT — PAIN SCALES - GENERAL: PAINLEVEL: NO PAIN (0)

## 2021-12-14 NOTE — LETTER
2021         RE: Michaela Dorman  [address]  [facility] [address]        Dear Colleague,    Thank you for referring your patient, Michaela Dorman, to the [facility] [place]. Please see a copy of my visit note below.    [facility] Hematology / Oncology  Progress Note 2021  Name: Michaela Dorman  :  1950    MRN:  [id number]    --------------------    Assessment / Plan:  Left-sided ER-, HER2+ breast CA:  # Sep 2016 Presented w/ left axillary mass; staging multicentric T4 lesion w/ geraldine involvement; biopsy w/ ER-, HER2+ breast cancer  # Sep 2016 - 2017 Neoadjuvant AC x 4 cycles/TH x 4 cycles.  # 2017 Bilateral mastectomy no with geraldine evaluation; complete path CR from invasive cancer; 7 mm DCIS residual.  # 2017 - May 2017 Adjuvant radiation to left chestwall / axilla.  # 2017 - Dec 2017 Adjuvant Herceptin.       Bladder cancer:([id number])  # Oct 2020 Presented w/ dysuria.  # 2021 Cytoscopy w/ muscle-invasive high grade urothelial cancer.  # Mar 2021 TURBT.  # 2021 Neoadjuvant cisplatin/gem split dose complicated by TAMIR.  # May 2021 Neoadjuvant carboplatin/gem.  # 2021 Radical cystectomy w/ ileal conduit; path high grade urothelial carcinoma muscle invasive; margins negs; nodes negative.  # 2021 Adjuvant opdivo; planning 12 months.    Clinically well.  Proceed w/ cycle 2 Opdivo; planning surveillance scan prior to cycle #4.  Reviewed supportive cares for dermatitis / rash; topical lotions and antihistamines.  Reviewed anticipatory side effects from opdivo.  Lozenges, warm washcloth for possible left submandibular glands; close monitoring; question if post-CEA discomfort; if worsening, include vascular surgery.    Girish Sauer MD    --------------------    Interval History:  Michaela returns for follow up bladder and breast cancer.  No rashes; some itching since starting Opdivo.  No diarrhea; tends towards  "constipation.  No jaundice, nausea, vomiting.  Some submandibular left ttp; no swelling; very mild in nature; seeing dentist soon.  Appetite, weight and energy at baseline.    --------------------    Physical Exam:  VS: /66 (BP Location: Right arm, Patient Position: Sitting, Cuff Size: Adult Regular)   Pulse 93   Temp 96.9  F (36.1  C) (Temporal)   Resp 18   Ht 1.664 m (5' 5.5\")   Wt 64.2 kg (141 lb 8 oz)   SpO2 99%   BMI 23.19 kg/m    GEN: Well appearing.  HEENT: PERRL, EOMI, no scleral icterus or injection; OP clear, moist mucous membranes, no oral lesions.  NECK: Supple.  KELLEY: No cervical, supraclavicular, axillary or inguinal KELLEY.  CHEST: Breathing comfortably, good airflow bilaterally, no crackles, no wheezing.  CV: RRR, s1/s2, no murmurs; strong distal pulses.  ABD: Non-tender, non-distended, soft, positive bowel sounds.  EXT: Warm and well perfused; no edema; no clubbing.  SKIN: Warm and dry, no visible rashes.  NEURO: Alert, engaged; CN 2-12 grossly intact; no gross sensorimotor deficits; gait and coordination intact; speech clear and fluent.    Labs / Imaging / Path:  We reviewed labs.        Again, thank you for allowing me to participate in the care of your patient.        Sincerely,        Girish Sauer MD    "

## 2021-12-14 NOTE — LETTER
2021         RE: Michaela Dorman  [address]  [facility] [address]        Dear Colleague,    Thank you for referring your patient, Michaela Dorman, to the [facility] [place]. Please see a copy of my visit note below.    [facility] Hematology / Oncology  Progress Note 2021  Name: Michaela Dorman  :  1950    MRN:  [id number]    --------------------    Assessment / Plan:  Left-sided ER-, HER2+ breast CA:  # Sep 2016 Presented w/ left axillary mass; staging multicentric T4 lesion w/ geraldine involvement; biopsy w/ ER-, HER2+ breast cancer  # Sep 2016 - 2017 Neoadjuvant AC x 4 cycles/TH x 4 cycles.  # 2017 Bilateral mastectomy no with geraldine evaluation; complete path CR from invasive cancer; 7 mm DCIS residual.  # 2017 - May 2017 Adjuvant radiation to left chestwall / axilla.  # 2017 - Dec 2017 Adjuvant Herceptin.       Bladder cancer:([id number])  # Oct 2020 Presented w/ dysuria.  # 2021 Cytoscopy w/ muscle-invasive high grade urothelial cancer.  # Mar 2021 TURBT.  # 2021 Neoadjuvant cisplatin/gem split dose complicated by TAMIR.  # May 2021 Neoadjuvant carboplatin/gem.  # 2021 Radical cystectomy w/ ileal conduit; path high grade urothelial carcinoma muscle invasive; margins negs; nodes negative.  # 2021 Adjuvant opdivo; planning 12 months.    Clinically well.  Proceed w/ cycle 2 Opdivo; planning surveillance scan prior to cycle #4.  Reviewed supportive cares for dermatitis / rash; topical lotions and antihistamines.  Reviewed anticipatory side effects from opdivo.  Lozenges, warm washcloth for possible left submandibular glands; close monitoring; question if post-CEA discomfort; if worsening, include vascular surgery.    Girsih Sauer MD    --------------------    Interval History:  Michaela returns for follow up bladder and breast cancer.  No rashes; some itching since starting Opdivo.  No diarrhea; tends towards  "constipation.  No jaundice, nausea, vomiting.  Some submandibular left ttp; no swelling; very mild in nature; seeing dentist soon.  Appetite, weight and energy at baseline.    --------------------    Physical Exam:  VS: /66 (BP Location: Right arm, Patient Position: Sitting, Cuff Size: Adult Regular)   Pulse 93   Temp 96.9  F (36.1  C) (Temporal)   Resp 18   Ht 1.664 m (5' 5.5\")   Wt 64.2 kg (141 lb 8 oz)   SpO2 99%   BMI 23.19 kg/m    GEN: Well appearing.  HEENT: PERRL, EOMI, no scleral icterus or injection; OP clear, moist mucous membranes, no oral lesions.  NECK: Supple.  KELLEY: No cervical, supraclavicular, axillary or inguinal KELLEY.  CHEST: Breathing comfortably, good airflow bilaterally, no crackles, no wheezing.  CV: RRR, s1/s2, no murmurs; strong distal pulses.  ABD: Non-tender, non-distended, soft, positive bowel sounds.  EXT: Warm and well perfused; no edema; no clubbing.  SKIN: Warm and dry, no visible rashes.  NEURO: Alert, engaged; CN 2-12 grossly intact; no gross sensorimotor deficits; gait and coordination intact; speech clear and fluent.    Labs / Imaging / Path:  We reviewed labs.        Again, thank you for allowing me to participate in the care of your patient.        Sincerely,        Girish Sauer MD    "

## 2021-12-14 NOTE — PROGRESS NOTES
[facility] Hematology / Oncology  Progress Note 2021  Name: Michaela Dorman  :  1950    MRN:  [id number]    --------------------    Assessment / Plan:  Left-sided ER-, HER2+ breast CA:  # Sep 2016 Presented w/ left axillary mass; staging multicentric T4 lesion w/ geraldine involvement; biopsy w/ ER-, HER2+ breast cancer  # Sep 2016 - 2017 Neoadjuvant AC x 4 cycles/TH x 4 cycles.  # 2017 Bilateral mastectomy no with geraldine evaluation; complete path CR from invasive cancer; 7 mm DCIS residual.  # 2017 - May 2017 Adjuvant radiation to left chestwall / axilla.  # 2017 - Dec 2017 Adjuvant Herceptin.       Bladder cancer:([id number])  # Oct 2020 Presented w/ dysuria.  # 2021 Cytoscopy w/ muscle-invasive high grade urothelial cancer.  # Mar 2021 TURBT.  # 2021 Neoadjuvant cisplatin/gem split dose complicated by TAMIR.  # May 2021 Neoadjuvant carboplatin/gem.  # 2021 Radical cystectomy w/ ileal conduit; path high grade urothelial carcinoma muscle invasive; margins negs; nodes negative.  # 2021 Adjuvant opdivo; planning 12 months.    Clinically well.  Proceed w/ cycle 2 Opdivo; planning surveillance scan prior to cycle #4.  Reviewed supportive cares for dermatitis / rash; topical lotions and antihistamines.  Reviewed anticipatory side effects from opdivo.  Lozenges, warm washcloth for possible left submandibular glands; close monitoring; question if post-CEA discomfort; if worsening, include vascular surgery.    Girish Sauer MD    --------------------    Interval History:  Michaela returns for follow up bladder and breast cancer.  No rashes; some itching since starting Opdivo.  No diarrhea; tends towards constipation.  No jaundice, nausea, vomiting.  Some submandibular left ttp; no swelling; very mild in nature; seeing dentist soon.  Appetite, weight and energy at baseline.    --------------------    Physical Exam:  VS: /66 (BP Location: Right arm, Patient  "Position: Sitting, Cuff Size: Adult Regular)   Pulse 93   Temp 96.9  F (36.1  C) (Temporal)   Resp 18   Ht 1.664 m (5' 5.5\")   Wt 64.2 kg (141 lb 8 oz)   SpO2 99%   BMI 23.19 kg/m    GEN: Well appearing.  HEENT: PERRL, EOMI, no scleral icterus or injection; OP clear, moist mucous membranes, no oral lesions.  NECK: Supple.  KELLEY: No cervical, supraclavicular, axillary or inguinal KELLEY.  CHEST: Breathing comfortably, good airflow bilaterally, no crackles, no wheezing.  CV: RRR, s1/s2, no murmurs; strong distal pulses.  ABD: Non-tender, non-distended, soft, positive bowel sounds.  EXT: Warm and well perfused; no edema; no clubbing.  SKIN: Warm and dry, no visible rashes.  NEURO: Alert, engaged; CN 2-12 grossly intact; no gross sensorimotor deficits; gait and coordination intact; speech clear and fluent.    Labs / Imaging / Path:  We reviewed labs.    "

## 2021-12-14 NOTE — PATIENT INSTRUCTIONS
Follow-up as planned.    Girish Sauer MD.    Today:  Continue with treatment and follow up as scheduled.    If you have any questions or concerns please feel free to call.    If you need to reschedule please call:  Clinic or Lab Appointment - [phone]  Infusion - [phone]  Imaging - [phone]    Juan José Hernandez RN, BSN, OCN  [facility]   Oncology/Hematology Care Coordinator RN  [facility]  [phone]    After Hours Oncology Nurse Line - [phone]

## 2021-12-14 NOTE — PROGRESS NOTES
Infusion Nursing Note:  Michaela Dorman presents today for Port labs only.    Patient seen by provider today: Yes: DR Sauer   present during visit today: Not Applicable.    Note: N/A.      Intravenous Access:  Labs drawn without difficulty.  Implanted Port.    Treatment Conditions:  Lab Results   Component Value Date    HGB 10.7 (L) 11/18/2021    WBC 5.8 11/18/2021    ANEU 3.5 06/01/2021    ANEUTAUTO 3.8 11/18/2021     11/18/2021      Lab Results   Component Value Date     12/14/2021    POTASSIUM 4.2 12/14/2021    MAG 1.5 (L) 11/18/2021    CR 0.91 12/14/2021    VANDANA 9.7 12/14/2021    BILITOTAL 0.2 12/14/2021    ALBUMIN 3.9 12/14/2021    ALT 36 12/14/2021    AST 22 12/14/2021         Post Infusion Assessment:  Blood return noted pre and post infusion.  Access discontinued per protocol.       Discharge Plan:   Discharge instructions reviewed with: Patient.  Departure Mode: Ambulatory.  Returning tomorrow for Opdivo.      Tami Knowles RN

## 2021-12-15 ENCOUNTER — PATIENT OUTREACH (OUTPATIENT)
Dept: CARE COORDINATION | Facility: CLINIC | Age: 71
End: 2021-12-15
Payer: COMMERCIAL

## 2021-12-15 ENCOUNTER — INFUSION THERAPY VISIT (OUTPATIENT)
Dept: INFUSION THERAPY | Facility: CLINIC | Age: 71
End: 2021-12-15
Attending: INTERNAL MEDICINE
Payer: MEDICARE

## 2021-12-15 VITALS
BODY MASS INDEX: 23.11 KG/M2 | WEIGHT: 141 LBS | TEMPERATURE: 98.4 F | SYSTOLIC BLOOD PRESSURE: 116 MMHG | HEART RATE: 61 BPM | DIASTOLIC BLOOD PRESSURE: 64 MMHG | RESPIRATION RATE: 12 BRPM | OXYGEN SATURATION: 98 %

## 2021-12-15 DIAGNOSIS — Z51.11 ENCOUNTER FOR ANTINEOPLASTIC CHEMOTHERAPY: ICD-10-CM

## 2021-12-15 DIAGNOSIS — C67.8 MALIGNANT NEOPLASM OF OVERLAPPING SITES OF BLADDER (H): ICD-10-CM

## 2021-12-15 DIAGNOSIS — C50.412 MALIGNANT NEOPLASM OF UPPER-OUTER QUADRANT OF LEFT BREAST IN FEMALE, ESTROGEN RECEPTOR NEGATIVE (H): ICD-10-CM

## 2021-12-15 DIAGNOSIS — E83.42 HYPOMAGNESEMIA: Primary | ICD-10-CM

## 2021-12-15 DIAGNOSIS — Z17.1 MALIGNANT NEOPLASM OF UPPER-OUTER QUADRANT OF LEFT BREAST IN FEMALE, ESTROGEN RECEPTOR NEGATIVE (H): ICD-10-CM

## 2021-12-15 PROCEDURE — 96413 CHEMO IV INFUSION 1 HR: CPT

## 2021-12-15 PROCEDURE — 250N000011 HC RX IP 250 OP 636: Performed by: INTERNAL MEDICINE

## 2021-12-15 PROCEDURE — 258N000003 HC RX IP 258 OP 636: Performed by: INTERNAL MEDICINE

## 2021-12-15 RX ORDER — HEPARIN SODIUM (PORCINE) LOCK FLUSH IV SOLN 100 UNIT/ML 100 UNIT/ML
5 SOLUTION INTRAVENOUS
Status: DISCONTINUED | OUTPATIENT
Start: 2021-12-15 | End: 2021-12-15 | Stop reason: HOSPADM

## 2021-12-15 RX ADMIN — SODIUM CHLORIDE 250 ML: 9 INJECTION, SOLUTION INTRAVENOUS at 09:51

## 2021-12-15 RX ADMIN — Medication 5 ML: at 11:23

## 2021-12-15 RX ADMIN — SODIUM CHLORIDE 480 MG: 9 INJECTION, SOLUTION INTRAVENOUS at 10:39

## 2021-12-15 ASSESSMENT — PAIN SCALES - GENERAL: PAINLEVEL: NO PAIN (0)

## 2021-12-15 NOTE — PROGRESS NOTES
Infusion Nursing Note:  Michaela Dorman presents today for Opdivo.    Patient seen by provider today: No   present during visit today: Not Applicable.    Note: N/A.      Intravenous Access:  Implanted Port.    Treatment Conditions:  Lab Results   Component Value Date     12/14/2021    POTASSIUM 4.2 12/14/2021    MAG 1.5 (L) 11/18/2021    CR 0.91 12/14/2021    VANDANA 9.7 12/14/2021    BILITOTAL 0.2 12/14/2021    ALBUMIN 3.9 12/14/2021    ALT 36 12/14/2021    AST 22 12/14/2021     Results reviewed, labs MET treatment parameters, ok to proceed with treatment.      Post Infusion Assessment:  Patient tolerated infusion without incident.  Patient observed for 15 minutes post Opdivo.  Blood return noted pre and post infusion.  Site patent and intact, free from redness, edema or discomfort.  No evidence of extravasations.  Access discontinued per protocol.       Discharge Plan:   Discharge instructions reviewed with: Patient.  Patient discharged in stable condition accompanied by: self.  Departure Mode: Ambulatory.      Susanne Yu RN

## 2021-12-15 NOTE — PROGRESS NOTES
"Oncology Distress Screening Follow-up  Clinical Social Work  [facility]    Identified Concern and Score From Distress Screenin. How concerned are you about your ability to eat?  0       2. How concerned are you about unintended weight loss or your current weight?  5       3. How concerned are you about feeling depressed or very sad?  3       4. How concerned are you about feeling anxious or very scared?  6 Abnormal        5. Do you struggle with the loss of meaning and bart in your life?  Not at all       6. How concerned are you about work and home life issues that may be affected by your cancer?  0       7. How concerned are you about knowing what resources are available to help you?  0       8. Do you currently have what you would describe as Episcopalian or spiritual struggles?             Not at all               Date of Distress Screenin21      Data: Michaela seen in virtual visit yesterday for for breast cancer follow up.       Intervention/Education Provided:: ADEN called and spoke to Michaela this afternoon. SW introduced self and reason for call. Michaela acknowledged she has been anxious, but reports that is feels manageable and \"I don't think there anything that can be done about it.\" She recognizes her occasionally anxiety as part of the process of the cancer she is going through. SW did speak with Michaela about support resources, including counseling and groups, but also things like meditation. Michaela isn't interested in anything at this time. She noted her appreciation for the check in and states she will reach out if needs or interest changes.       Follow-up Required: No follow up anticipated. Michaela will reach out with any further questions or needs.         JATINDER Box, [facility]  Clinical , Adult Oncology  Phone: [phone]    "

## 2021-12-27 DIAGNOSIS — E03.4 HYPOTHYROIDISM DUE TO ACQUIRED ATROPHY OF THYROID: ICD-10-CM

## 2021-12-27 DIAGNOSIS — G89.4 CHRONIC PAIN SYNDROME: ICD-10-CM

## 2021-12-27 RX ORDER — LEVOTHYROXINE SODIUM 25 UG/1
25 TABLET ORAL DAILY
Qty: 90 TABLET | Refills: 0 | Status: SHIPPED | OUTPATIENT
Start: 2021-12-27 | End: 2022-03-28

## 2021-12-27 RX ORDER — OXYCODONE AND ACETAMINOPHEN 5; 325 MG/1; MG/1
TABLET ORAL
Qty: 30 TABLET | Refills: 0 | Status: SHIPPED | OUTPATIENT
Start: 2021-12-27 | End: 2022-02-07

## 2022-01-04 ENCOUNTER — HOSPITAL ENCOUNTER (OUTPATIENT)
Dept: ULTRASOUND IMAGING | Facility: CLINIC | Age: 72
Discharge: HOME | End: 2022-01-04
Attending: SURGERY | Admitting: SURGERY
Payer: MEDICARE

## 2022-01-04 DIAGNOSIS — I65.23 CAROTID STENOSIS, BILATERAL: ICD-10-CM

## 2022-01-04 DIAGNOSIS — Z98.890 HISTORY OF LEFT-SIDED CAROTID ENDARTERECTOMY: ICD-10-CM

## 2022-01-04 PROCEDURE — 93880 EXTRACRANIAL BILAT STUDY: CPT

## 2022-01-10 ENCOUNTER — VIRTUAL VISIT (OUTPATIENT)
Dept: ONCOLOGY | Facility: CLINIC | Age: 72
End: 2022-01-10
Payer: COMMERCIAL

## 2022-01-10 ENCOUNTER — LAB (OUTPATIENT)
Dept: INFUSION THERAPY | Facility: CLINIC | Age: 72
End: 2022-01-10
Attending: INTERNAL MEDICINE
Payer: MEDICARE

## 2022-01-10 DIAGNOSIS — C67.8 MALIGNANT NEOPLASM OF OVERLAPPING SITES OF BLADDER (H): Primary | ICD-10-CM

## 2022-01-10 DIAGNOSIS — Z17.1 MALIGNANT NEOPLASM OF UPPER-OUTER QUADRANT OF LEFT BREAST IN FEMALE, ESTROGEN RECEPTOR NEGATIVE (H): ICD-10-CM

## 2022-01-10 DIAGNOSIS — L30.9 DERMATITIS: ICD-10-CM

## 2022-01-10 DIAGNOSIS — C67.8 MALIGNANT NEOPLASM OF OVERLAPPING SITES OF BLADDER (H): ICD-10-CM

## 2022-01-10 DIAGNOSIS — E83.42 HYPOMAGNESEMIA: ICD-10-CM

## 2022-01-10 DIAGNOSIS — C50.412 MALIGNANT NEOPLASM OF UPPER-OUTER QUADRANT OF LEFT BREAST IN FEMALE, ESTROGEN RECEPTOR NEGATIVE (H): ICD-10-CM

## 2022-01-10 DIAGNOSIS — Z51.11 ENCOUNTER FOR ANTINEOPLASTIC CHEMOTHERAPY: ICD-10-CM

## 2022-01-10 DIAGNOSIS — E83.42 HYPOMAGNESEMIA: Primary | ICD-10-CM

## 2022-01-10 DIAGNOSIS — R60.9 FLUID RETENTION: ICD-10-CM

## 2022-01-10 LAB
ALBUMIN SERPL-MCNC: 3.7 G/DL (ref 3.4–5)
ALP SERPL-CCNC: 50 U/L (ref 40–150)
ALT SERPL W P-5'-P-CCNC: 27 U/L (ref 0–50)
ANION GAP SERPL CALCULATED.3IONS-SCNC: 4 MMOL/L (ref 3–14)
AST SERPL W P-5'-P-CCNC: 19 U/L (ref 0–45)
BILIRUB SERPL-MCNC: 0.2 MG/DL (ref 0.2–1.3)
BUN SERPL-MCNC: 21 MG/DL (ref 7–30)
CALCIUM SERPL-MCNC: 9.6 MG/DL (ref 8.5–10.1)
CHLORIDE BLD-SCNC: 107 MMOL/L (ref 94–109)
CO2 SERPL-SCNC: 26 MMOL/L (ref 20–32)
CREAT SERPL-MCNC: 1.03 MG/DL (ref 0.52–1.04)
GFR SERPL CREATININE-BSD FRML MDRD: 57 ML/MIN/1.73M2
GLUCOSE BLD-MCNC: 93 MG/DL (ref 70–99)
POTASSIUM BLD-SCNC: 4.3 MMOL/L (ref 3.4–5.3)
PROT SERPL-MCNC: 7.3 G/DL (ref 6.8–8.8)
SODIUM SERPL-SCNC: 137 MMOL/L (ref 133–144)
TSH SERPL DL<=0.005 MIU/L-ACNC: 2.73 MU/L (ref 0.4–4)

## 2022-01-10 PROCEDURE — 80053 COMPREHEN METABOLIC PANEL: CPT | Performed by: INTERNAL MEDICINE

## 2022-01-10 PROCEDURE — 250N000011 HC RX IP 250 OP 636: Performed by: INTERNAL MEDICINE

## 2022-01-10 PROCEDURE — 82040 ASSAY OF SERUM ALBUMIN: CPT | Performed by: INTERNAL MEDICINE

## 2022-01-10 PROCEDURE — 84443 ASSAY THYROID STIM HORMONE: CPT | Performed by: INTERNAL MEDICINE

## 2022-01-10 PROCEDURE — 36591 DRAW BLOOD OFF VENOUS DEVICE: CPT

## 2022-01-10 PROCEDURE — 99214 OFFICE O/P EST MOD 30 MIN: CPT | Mod: 95 | Performed by: INTERNAL MEDICINE

## 2022-01-10 RX ORDER — HEPARIN SODIUM,PORCINE 10 UNIT/ML
5 VIAL (ML) INTRAVENOUS
Status: CANCELLED | OUTPATIENT
Start: 2022-04-06

## 2022-01-10 RX ORDER — HEPARIN SODIUM,PORCINE 10 UNIT/ML
5 VIAL (ML) INTRAVENOUS
Status: CANCELLED | OUTPATIENT
Start: 2022-03-09

## 2022-01-10 RX ORDER — NALOXONE HYDROCHLORIDE 0.4 MG/ML
0.2 INJECTION, SOLUTION INTRAMUSCULAR; INTRAVENOUS; SUBCUTANEOUS
Status: CANCELLED | OUTPATIENT
Start: 2022-04-06

## 2022-01-10 RX ORDER — ALBUTEROL SULFATE 0.83 MG/ML
2.5 SOLUTION RESPIRATORY (INHALATION)
Status: CANCELLED | OUTPATIENT
Start: 2022-02-09

## 2022-01-10 RX ORDER — HEPARIN SODIUM (PORCINE) LOCK FLUSH IV SOLN 100 UNIT/ML 100 UNIT/ML
500 SOLUTION INTRAVENOUS ONCE
Status: COMPLETED | OUTPATIENT
Start: 2022-01-10 | End: 2022-01-10

## 2022-01-10 RX ORDER — ALBUTEROL SULFATE 90 UG/1
1-2 AEROSOL, METERED RESPIRATORY (INHALATION)
Status: CANCELLED
Start: 2022-02-09

## 2022-01-10 RX ORDER — METHYLPREDNISOLONE SODIUM SUCCINATE 125 MG/2ML
125 INJECTION, POWDER, LYOPHILIZED, FOR SOLUTION INTRAMUSCULAR; INTRAVENOUS
Status: CANCELLED
Start: 2022-02-09

## 2022-01-10 RX ORDER — HEPARIN SODIUM (PORCINE) LOCK FLUSH IV SOLN 100 UNIT/ML 100 UNIT/ML
5 SOLUTION INTRAVENOUS
Status: CANCELLED | OUTPATIENT
Start: 2022-02-09

## 2022-01-10 RX ORDER — DIPHENHYDRAMINE HYDROCHLORIDE 50 MG/ML
50 INJECTION INTRAMUSCULAR; INTRAVENOUS
Status: CANCELLED
Start: 2022-02-09

## 2022-01-10 RX ORDER — HEPARIN SODIUM,PORCINE 10 UNIT/ML
5 VIAL (ML) INTRAVENOUS
Status: CANCELLED | OUTPATIENT
Start: 2022-02-09

## 2022-01-10 RX ORDER — METHYLPREDNISOLONE SODIUM SUCCINATE 125 MG/2ML
125 INJECTION, POWDER, LYOPHILIZED, FOR SOLUTION INTRAMUSCULAR; INTRAVENOUS
Status: CANCELLED
Start: 2022-04-06

## 2022-01-10 RX ORDER — HEPARIN SODIUM (PORCINE) LOCK FLUSH IV SOLN 100 UNIT/ML 100 UNIT/ML
5 SOLUTION INTRAVENOUS
Status: CANCELLED | OUTPATIENT
Start: 2022-03-09

## 2022-01-10 RX ORDER — METHYLPREDNISOLONE SODIUM SUCCINATE 125 MG/2ML
125 INJECTION, POWDER, LYOPHILIZED, FOR SOLUTION INTRAMUSCULAR; INTRAVENOUS
Status: CANCELLED
Start: 2022-03-09

## 2022-01-10 RX ORDER — ALBUTEROL SULFATE 90 UG/1
1-2 AEROSOL, METERED RESPIRATORY (INHALATION)
Status: CANCELLED
Start: 2022-03-09

## 2022-01-10 RX ORDER — NALOXONE HYDROCHLORIDE 0.4 MG/ML
0.2 INJECTION, SOLUTION INTRAMUSCULAR; INTRAVENOUS; SUBCUTANEOUS
Status: CANCELLED | OUTPATIENT
Start: 2022-03-09

## 2022-01-10 RX ORDER — ALBUTEROL SULFATE 0.83 MG/ML
2.5 SOLUTION RESPIRATORY (INHALATION)
Status: CANCELLED | OUTPATIENT
Start: 2022-04-06

## 2022-01-10 RX ORDER — EPINEPHRINE 1 MG/ML
0.3 INJECTION, SOLUTION INTRAMUSCULAR; SUBCUTANEOUS EVERY 5 MIN PRN
Status: CANCELLED | OUTPATIENT
Start: 2022-02-09

## 2022-01-10 RX ORDER — EPINEPHRINE 1 MG/ML
0.3 INJECTION, SOLUTION INTRAMUSCULAR; SUBCUTANEOUS EVERY 5 MIN PRN
Status: CANCELLED | OUTPATIENT
Start: 2022-03-09

## 2022-01-10 RX ORDER — MEPERIDINE HYDROCHLORIDE 25 MG/ML
25 INJECTION INTRAMUSCULAR; INTRAVENOUS; SUBCUTANEOUS EVERY 30 MIN PRN
Status: CANCELLED | OUTPATIENT
Start: 2022-03-09

## 2022-01-10 RX ORDER — MEPERIDINE HYDROCHLORIDE 25 MG/ML
25 INJECTION INTRAMUSCULAR; INTRAVENOUS; SUBCUTANEOUS EVERY 30 MIN PRN
Status: CANCELLED | OUTPATIENT
Start: 2022-02-09

## 2022-01-10 RX ORDER — DIPHENHYDRAMINE HYDROCHLORIDE 50 MG/ML
50 INJECTION INTRAMUSCULAR; INTRAVENOUS
Status: CANCELLED
Start: 2022-04-06

## 2022-01-10 RX ORDER — NALOXONE HYDROCHLORIDE 0.4 MG/ML
0.2 INJECTION, SOLUTION INTRAMUSCULAR; INTRAVENOUS; SUBCUTANEOUS
Status: CANCELLED | OUTPATIENT
Start: 2022-02-09

## 2022-01-10 RX ORDER — MEPERIDINE HYDROCHLORIDE 25 MG/ML
25 INJECTION INTRAMUSCULAR; INTRAVENOUS; SUBCUTANEOUS EVERY 30 MIN PRN
Status: CANCELLED | OUTPATIENT
Start: 2022-04-06

## 2022-01-10 RX ORDER — EPINEPHRINE 1 MG/ML
0.3 INJECTION, SOLUTION INTRAMUSCULAR; SUBCUTANEOUS EVERY 5 MIN PRN
Status: CANCELLED | OUTPATIENT
Start: 2022-04-06

## 2022-01-10 RX ORDER — ALBUTEROL SULFATE 90 UG/1
1-2 AEROSOL, METERED RESPIRATORY (INHALATION)
Status: CANCELLED
Start: 2022-04-06

## 2022-01-10 RX ORDER — DIPHENHYDRAMINE HYDROCHLORIDE 50 MG/ML
50 INJECTION INTRAMUSCULAR; INTRAVENOUS
Status: CANCELLED
Start: 2022-03-09

## 2022-01-10 RX ORDER — HEPARIN SODIUM (PORCINE) LOCK FLUSH IV SOLN 100 UNIT/ML 100 UNIT/ML
5 SOLUTION INTRAVENOUS
Status: CANCELLED | OUTPATIENT
Start: 2022-04-06

## 2022-01-10 RX ORDER — ALBUTEROL SULFATE 0.83 MG/ML
2.5 SOLUTION RESPIRATORY (INHALATION)
Status: CANCELLED | OUTPATIENT
Start: 2022-03-09

## 2022-01-10 RX ADMIN — Medication 500 UNITS: at 11:27

## 2022-01-10 NOTE — LETTER
1/10/2022         RE: Michaela Dorman  [address]  [place] [facility] [address]        Dear Colleague,    Thank you for referring your patient, Michaela Dorman, to the [facility] [place]. Please see a copy of my visit note below.    [facility] Hematology / Oncology  Progress Note  Name: Michaela Dorman  :  1950    MRN:  [id number]    --------------------    Assessment / Plan:  Left-sided ER-, HER2+ breast CA:  # Sep 2016 Presented w/ left axillary mass; staging multicentric T4 lesion w/ geraldine involvement; biopsy w/ ER-, HER2+ breast cancer  # Sep 2016 - 2017 Neoadjuvant AC x 4 cycles/TH x 4 cycles.  # 2017 Bilateral mastectomy no with geraldine evaluation; complete path CR from invasive cancer; 7 mm DCIS residual.  # 2017 - May 2017 Adjuvant radiation to left chestwall / axilla.  # 2017 - Dec 2017 Adjuvant Herceptin.    Bladder cancer:([id number])  # Oct 2020 Presented w/ dysuria.  # 2021 Cytoscopy w/ muscle-invasive high grade urothelial cancer.  # Mar 2021 TURBT.  # 2021 Neoadjuvant cisplatin/gem split dose complicated by TAMIR.  # May 2021 Neoadjuvant carboplatin/gem.  # 2021 Radical cystectomy w/ ileal conduit; path high grade urothelial carcinoma muscle invasive; margins negs; nodes negative.  # 2021 Adjuvant opdivo; planning 12 months.    Clinically well.  Proceed w/ cycle 3 Opdivo; planning surveillance scan prior to cycle #4.  Reviewed supportive cares for dermatitis / rash; topical lotions and antihistamines.  Monitor fluid retention; could have diuretics prn; will grab urine protein w/ next treatment; scans upcoming.    Girish Sauer MD    --------------------    Interval History:  Michaela returns for follow up bladder and breast cancer.  No rashes; some itching since starting Opdivo; improved w/ Claritin.  Some slight fluid retention in ankles and abdomen.  Some mild muscle aches following exercise and walking.  No diarrhea;  tends towards constipation.  No jaundice, nausea, vomiting.  Appetite, weight and energy at baseline.    --------------------    Physical Exam:  Video visit.    Labs / Imaging / Path:  We reviewed labs.    Video Visit:  Michaela is a 71 year old female who is being evaluated via a billable video visit.  }    Video start time: 2:02 PM  Video end time: 2:15 PM    Provider location: [facility]  Patient location: Home    Mode of transmission:  [employer].          Again, thank you for allowing me to participate in the care of your patient.        Sincerely,        Girish Sauer MD

## 2022-01-10 NOTE — PATIENT INSTRUCTIONS
Preparing for Your Surgery      Name:  Michaela Dorman   MRN:  [id number]   :  1950   Today's Date:  2021       Arriving for surgery:  Surgery date:  2021  Arrival time:  6:30 am    Restrictions due to COVID 19:  One consistent visitor per patient is allowed.  The visitor will be allowed in the pre-op area.  Visitors are asked to leave the building during the surgery.  No ill visitors.  All visitors must wear face mask.    [employer] parking is available for anyone with mobility limitations or disabilities.  ([place]  24 hours/ 7 days a week; [facility]  7 am- 3:30 pm, Mon- Fri)    Please come to:     [facility] [address]  [address] [place]  [address]       -    Please proceed to [address] on the 3rd floor. [phone]?     - ?If you are in need of directions, wheelchair or escort please stop at the Information Desk in the lobby.  ?     What can I eat or drink?  -  Follow diet restrictions as instructed by surgeon  Clear liquids the day before surgery   -  You may have clear liquids until 2 hours before surgery. (Until 6:30 am arrival time)    Examples of clear liquids:  Water  Clear broth  Juices (apple, white grape, white cranberry  and cider) without pulp  Noncarbonated, powder based beverages  (lemonade and Chavez-Aid)  Sodas (Sprite, 7-Up, ginger ale and seltzer)  Coffee or tea (without milk or cream)  Gatorade    -  No Alcohol for at least 24 hours before surgery     Which medicines can I take?    Hold Aspirin for 7 days before surgery.     Hold Multivitamins for 7 days before surgery.  Hold Supplements for 7 days before surgery.  Hold Ibuprofen (Advil, Motrin) for 1 day before surgery--unless otherwise directed by surgeon.  Hold Naproxen (Aleve) for 4 days before surgery.    -  DO NOT take these medications the day of surgery:  Polyethylene Glycol  (miralax)       -  PLEASE TAKE these medications the day of  surgery:  Atorvastatin (if usually taken in the am)  Carvedilol  Escitalopram (lexapro)   Lorazepam if needed  Omeprazole   Oxycodone if needed  Prochlorperazine if needed       How do I prepare myself?  - Please take 2 showers before surgery using Scrubcare or Hibiclens soap.    Use this soap only from the neck to your toes.     Leave the soap on your skin for one minute--then rinse thoroughly.      You may use your own shampoo and conditioner; no other hair products.   - Please remove all jewelry and body piercings.  - No lotions, deodorants or fragrance.  - No makeup or fingernail polish.   - Bring your ID and insurance card.    - All patients are required to have a Covid-19 test within 4 days of surgery/procedure.      -Patients will be contacted by the [facility] scheduling team within 1 week of surgery to make an appointment.      - Patients may call the Scheduling team at [phone] if they have not been scheduled within 4 days of  surgery.      Questions or Concerns:    - For any questions regarding the day of surgery or your hospital stay, please contact the Pre Admission Nursing Office at [phone].       - If you have health changes between today and your surgery please call your surgeon.       For questions after surgery please call your surgeons office.     Enhanced Recovery After Surgery     This is a team effort, including you, to get you back on your feet, eating and drinking normally and out of the hospital as quickly as possible.  The goals are: 1) NO INFECTIONS and   2) RETURN TO NORMAL DIET    How can we achieve these goals?  1) STAY ACTIVE: Walk every day before your surgery; try to increase the amount every day.  Walk after surgery as much as you can-the nurses will help you.  Walking speeds healing and gets you home quicker, you heal better at home and have less risk of infection.     2) INCENTIVE SPIROMETER: Practice your incentive spirometer 4 times per day with 5 repetitions  each time.  Using the incentive spirometer can strengthen your muscles between your ribs and help you have a strong cough after surgery.  A more effective cough can help prevent problems with your lungs.    3) STAY HYDRATED: Drink clear liquids up until 2 hours before your surgery.     We would like you to purchase a drink such as Gatorade or Ensure Clear (not the milkshake type).  Drink this before bedtime and on the way into the hospital, drink between 8-10 ounces or until you feel hydrated.      Keeping well hydrated leads to your veins being plump, you wake up faster, and you are less likely to be nauseated. Start drinking water as soon as you can after surgery and advance to clear liquids and food as tolerated.  IV fluids contain salt, drinking fluids will minimize the amount of IV fluids you need and decrease the amount of salt you get.    The most common reason for the patient to be readmitted is dehydration. Staying hydrated after you go home from the hospital is very important.  Ensure or Ensure Clear are good options to keep you hydrated.     4) PAIN MANAGEMENT: If we minimize the amount of opioids and narcotics, and use regional blocks (which numb the area where your surgery is) along with oral pain medications; you will have less side effects of nausea and constipation. Narcotics can slow down your bowels and cause you to stay in the hospital longer.     Our goal is to keep you comfortable; eating and drinking normally and back home safely.              Detail Level: Zone

## 2022-01-10 NOTE — PROGRESS NOTES
Infusion Nursing Note:  Michaela Dorman presents today for Port labs.    Patient seen by provider today: yes, Dr. Sauer.   present during visit today: Not Applicable.    Note: N/A.      Intravenous Access:  Implanted Port.  Lab draw site Right chest wall, Needle type 20G,3/4in.  Labs drawn without difficulty.  Site WDL, deaccessed with Heparin per protocol.      Discharge Plan:   Patient and/or family verbalized understanding of discharge instructions and all questions answered. Will return tomorrow 1/11 for treatment at [facility].   Patient discharged in stable condition accompanied by: self.  Departure Mode: Ambulatory.      Amy Renee RN

## 2022-01-10 NOTE — PATIENT INSTRUCTIONS
1) Urine protein with upcoming treatment.  2) Follow-up otherwise as scheduled.    Girish Sauer MD.

## 2022-01-10 NOTE — PROGRESS NOTES
What is Hematuria?  Blood in your urine is a condition known as hematuria. Most of the time, the cause of hematuria is not serious. But, never ignore blood in the urine. Your doctor can evaluate you to find the cause of the bleeding and treat it, if needed.  Types of hematuria  · Gross hematuria means that the blood can be seen by the naked eye. The urine may look pinkish, brownish, or bright red.  · Microscopic hematuria means that the urine is clear, but blood cells can be seen when urine is looked at under a microscope or tested in a lab.  Both types of hematuria can have the same causes. Neither one is necessarily more serious than the other. With either type, you may have other symptoms, such as pain, pressure, or burning when you urinate, abdominal pain, or back pain. Or, you may not have any other symptoms. No matter how much blood is found, the cause of the bleeding needs to be identified.  Finding the cause of hematuria  To evaluate your condition, your doctor will first confirm that blood is indeed present. Then other tests are done to pinpoint where the blood is coming from and why. Your doctor will decide which tests will best determine the cause of your hematuria. Some common tests are listed below.  · History and physical exam  · Lab tests may include urinalysis, a urine culture, a urine cytology, and various blood tests  · Cystoscopy  · Computed tomography (CT) or CT urography  · Magnetic resonance imaging (MRI) or MR urography  · Ultrasound of the kidney  · Kidney biopsy  Causes of hematuria include the very benign (exercised induced hematuria) to the very severe (cancer of the urinary system). A variety of treatments are available depending on the cause.  Date Last Reviewed: 12/2/2016 © 2000-2017 [employer]. [address] [place] [address]. All rights reserved. This information is not intended as a substitute for professional medical care. Always follow your healthcare  [facility] Hematology / Oncology  Progress Note  Name: Michaela Dorman  :  1950    MRN:  [id number]    --------------------    Assessment / Plan:  Left-sided ER-, HER2+ breast CA:  # Sep 2016 Presented w/ left axillary mass; staging multicentric T4 lesion w/ geraldine involvement; biopsy w/ ER-, HER2+ breast cancer  # Sep 2016 - 2017 Neoadjuvant AC x 4 cycles/TH x 4 cycles.  # 2017 Bilateral mastectomy no with geraldine evaluation; complete path CR from invasive cancer; 7 mm DCIS residual.  # 2017 - May 2017 Adjuvant radiation to left chestwall / axilla.  # 2017 - Dec 2017 Adjuvant Herceptin.    Bladder cancer:([id number])  # Oct 2020 Presented w/ dysuria.  # 2021 Cytoscopy w/ muscle-invasive high grade urothelial cancer.  # Mar 2021 TURBT.  # 2021 Neoadjuvant cisplatin/gem split dose complicated by TAMIR.  # May 2021 Neoadjuvant carboplatin/gem.  # 2021 Radical cystectomy w/ ileal conduit; path high grade urothelial carcinoma muscle invasive; margins negs; nodes negative.  # 2021 Adjuvant opdivo; planning 12 months.    Clinically well.  Proceed w/ cycle 3 Opdivo; planning surveillance scan prior to cycle #4.  Reviewed supportive cares for dermatitis / rash; topical lotions and antihistamines.  Monitor fluid retention; could have diuretics prn; will grab urine protein w/ next treatment; scans upcoming.    Girish Sauer MD    --------------------    Interval History:  Michaela returns for follow up bladder and breast cancer.  No rashes; some itching since starting Opdivo; improved w/ Claritin.  Some slight fluid retention in ankles and abdomen.  Some mild muscle aches following exercise and walking.  No diarrhea; tends towards constipation.  No jaundice, nausea, vomiting.  Appetite, weight and energy at baseline.    --------------------    Physical Exam:  Video visit.    Labs / Imaging / Path:  We reviewed labs.    Video Visit:  Michaela is a 71 year old female who is being  evaluated via a billable video visit.  }    Video start time: 2:02 PM  Video end time: 2:15 PM    Provider location: [facility]  Patient location: Home    Mode of transmission:  [employer] / [employer].       professional's instructions.      Cystoscopy on 1/22/20 at [facility] [address] in afternoon  * bring valium with and take upon checkin - must have ride    Cystoscopy is a procedure that lets your doctor look directly inside your urethra and bladder. It can be used to:  · Help diagnose a problem with your urethra, bladder, or kidneys.  · Take a sample (biopsy) of bladder or urethral tissue.  · Treat certain problems (such as removing kidney stones).  · Place a stent to bypass an obstruction.  · Take special X-rays of the kidneys.  Based on the findings, your doctor may recommend other tests or treatments.  What is a cystoscope?  A cystoscope is a telescope-like instrument that contains lenses and fiberoptics (small glass wires that make bright light). The cystoscope may be straight and rigid, or flexible to bend around curves in the urethra. The doctor may look directly into the cystoscope, or project the image onto a monitor.  Getting ready  · Ask your doctor if you should stop taking any medicines before the procedure.  · Ask whether you should avoid eating or drinking anything after midnight before the procedure.  · Follow any other instructions your doctor gives you.  Tell your doctor before the exam if you:  · Take any medicines, such as aspirin or blood thinners  · Have allergies to any medicines  · Are pregnant   The procedure  Cystoscopy is done in the doctors office or surgery center. The doctor and a nurse are present during the procedure. It takes only a few minutes, longer if a biopsy, X-ray, or treatment needs to be done.  During the procedure:  · You lie on an exam table on your back, knees bent and legs apart. You are covered with a drape.  · Your urethra and the area around it are washed. Anesthetic jelly may be applied to numb the urethra. Other pain medicine is usually not needed. In some cases, you may be offered a mild sedative to help you relax.  · The cystoscope is inserted. A sterile  fluid is put into the bladder to expand it. You may feel pressure from this fluid.  · When the procedure is done, the cystoscope is removed.  After the procedure  If you had a sedative, you must have someone drive you home. Once youre home:  · Drink plenty of fluids.  · You may have burning or light bleeding when you urinate--this is normal.  · Medicines may be prescribed to ease any discomfort or prevent infection. Take these as directed.  · Call your doctor if you have heavy bleeding or blood clots, burning that lasts more than a day, a fever over 100°F  (38° C), or trouble urinating.  Date Last Reviewed: 1/1/2017  © 6971-4777 [employer]. [address] [place] [address], [place] [address]. All rights reserved. This information is not intended as a substitute for professional medical care. Always follow your healthcare professional's instructions.    Cystoscopy is needed bc of blood in the urine to evaluate lower tract - bladder, prostate, urethra.  Prior to this, will get CT scan of upper urinary tract    And because a prostate nodule was felt on exam:  Recommended is prostate biopsy (to rule out prostate cancer)  In setting of normal psa, before proceeding with prostate biopsy can  1. Recheck psa with free psa (normal >30%, where <10% concerning for cancer)  And 2. High intensity prostate MRI (in [place])  ** if BOTH are normal, can defer prostate biopsy    If concern from either, or either are equivocal, would perform prostate biopsy at time of cystoscopy.  General information about prostate biopsy below, however refer to the provided biopsy instruction and expectation sheet given by nursing staff.      Prostate Biopsy    Cancer occurs when abnormal cells form a tumor. A tumor is a lump of cells that grow uncontrolled. Initial tests that may indicate cancer of the prostate include a digital rectal exam, a PSA (prostate specific antigen blood test), ultrasound, and others. A core needle biopsy will be  done if your healthcare provider thinks you have prostate cancer. A thin needle is used to remove small samples of prostate tissue. Usually multiple biopsies are taken. These samples are checked for cancer.  Taking tissue samples  A biopsy takes about 15 to 20 minutes. You may be given an enema or suppository before the biopsy to clear the bowels. Antibiotics are given at least an hour before the biopsy. During the procedure:  · You will be given antibiotics to prevent infection.  · You may be given a sedative, local pain killer, or pain medicine.  · A small, ultrasound  probe is put into the rectum as you lie on your side. A picture of your prostate can then be seen on a monitor. This is called a transrectal ultrasound (TRUS).  · Your healthcare provider will use the TRUS picture as a guide. He or she will use a thin needle to remove tiny tissue samples from some sites in the prostate.  · These tissue samples are sent to the pathology department. They are looked at under a microscope so a diagnosis can be made.   Risks and complications of core needle biopsy  · Infection  · Blood in urine, stool, or semen  · Pain  · The biopsy misses the tumor   Home care  You may have had the biopsy done through your rectum, your urethra, or through the skin between your scrotum and rectum. Your healthcare provider will tell you what to do after the biopsy. These instructions are based on your health condition, the type of biopsy, and your providers practices.  · Your provider may give you pain medicine such as acetaminophen or ibuprofen for discomfort or pain. Follow your providers instructions for taking these medicines. Dont take aspirin or ibuprofen after the procedure. If you have a chronic liver or kidney disease, talk with your provider before taking these medicines. Also talk with your provider if youve had a stomach ulcer or gastrointestinal bleeding. Let your provider know all the medicines you currently take.  · You  may need to take antibiotics for 1 to 2 days. This will help prevent an infection. Signs of an infection include chills, pain, or fever.  · You may be told to drink 8 ounces of water every 30 minutes for 2 hours. This will help ease any discomfort. You can also take a warm bath or put a warm, damp washcloth over your urethra to help ease the pain.  · You may see minor bleeding after the procedure. This is normal and usually needs no treatment. You may see blood in your urine or semen (rust color). You may also have light bleeding from your rectum if you have hemorrhoids.  · Your provider will tell you when you can go back to your normal activities. This includes sex, exercise, and straining physically. Discuss these with your provider.  When to seek medical advice  Call your healthcare provider right away if any of these occur:  · You arent able to urinate, or see that you have less flow of urine  · Chills and fever of 100.4°F (38°C)    · Severe pain  · Signs of an infection that is getting worse. These include worsening pain, pain in your side under the rib cage or in the low back, or foul-smelling urine.  · Blood clots or bright red blood in your stool or urine  · You feel confused or very tired  Date Last Reviewed: 1/1/2017  © 4025-9623 [employer]. [address] [place] [address], [place] [address]. All rights reserved. This information is not intended as a substitute for professional medical care. Always follow your healthcare professional's instructions.    contine flomax and finasteride in interim    All else being negative, cystoscopy will also evaluate for any significant prostate obstruction as you do have incomplete bladder emptying and obstructive lower urinary tract symptoms currently managed on 2 medications

## 2022-01-11 ENCOUNTER — INFUSION THERAPY VISIT (OUTPATIENT)
Dept: INFUSION THERAPY | Facility: CLINIC | Age: 72
End: 2022-01-11
Attending: INTERNAL MEDICINE
Payer: MEDICARE

## 2022-01-11 VITALS
TEMPERATURE: 98.2 F | BODY MASS INDEX: 24.02 KG/M2 | WEIGHT: 146.6 LBS | RESPIRATION RATE: 16 BRPM | DIASTOLIC BLOOD PRESSURE: 74 MMHG | OXYGEN SATURATION: 96 % | HEART RATE: 63 BPM | SYSTOLIC BLOOD PRESSURE: 120 MMHG

## 2022-01-11 DIAGNOSIS — C50.412 MALIGNANT NEOPLASM OF UPPER-OUTER QUADRANT OF LEFT BREAST IN FEMALE, ESTROGEN RECEPTOR NEGATIVE (H): ICD-10-CM

## 2022-01-11 DIAGNOSIS — C67.8 MALIGNANT NEOPLASM OF OVERLAPPING SITES OF BLADDER (H): ICD-10-CM

## 2022-01-11 DIAGNOSIS — E83.42 HYPOMAGNESEMIA: ICD-10-CM

## 2022-01-11 DIAGNOSIS — Z17.1 MALIGNANT NEOPLASM OF UPPER-OUTER QUADRANT OF LEFT BREAST IN FEMALE, ESTROGEN RECEPTOR NEGATIVE (H): ICD-10-CM

## 2022-01-11 DIAGNOSIS — R60.9 FLUID RETENTION: ICD-10-CM

## 2022-01-11 DIAGNOSIS — Z51.11 ENCOUNTER FOR ANTINEOPLASTIC CHEMOTHERAPY: Primary | ICD-10-CM

## 2022-01-11 DIAGNOSIS — R60.9 FLUID RETENTION: Primary | ICD-10-CM

## 2022-01-11 DIAGNOSIS — F41.9 ANXIETY: ICD-10-CM

## 2022-01-11 DIAGNOSIS — F43.21 ADJUSTMENT DISORDER WITH DEPRESSED MOOD: ICD-10-CM

## 2022-01-11 LAB
CREAT UR-MCNC: 115 MG/DL
PROT UR-MCNC: 0.3 G/L
PROT/CREAT 24H UR: 0.26 G/G CR (ref 0–0.2)

## 2022-01-11 PROCEDURE — 96375 TX/PRO/DX INJ NEW DRUG ADDON: CPT

## 2022-01-11 PROCEDURE — 258N000003 HC RX IP 258 OP 636: Performed by: INTERNAL MEDICINE

## 2022-01-11 PROCEDURE — 250N000011 HC RX IP 250 OP 636: Performed by: INTERNAL MEDICINE

## 2022-01-11 PROCEDURE — 96413 CHEMO IV INFUSION 1 HR: CPT

## 2022-01-11 PROCEDURE — 250N000009 HC RX 250: Performed by: INTERNAL MEDICINE

## 2022-01-11 PROCEDURE — 84156 ASSAY OF PROTEIN URINE: CPT

## 2022-01-11 RX ORDER — ALPRAZOLAM 0.5 MG
TABLET ORAL
Qty: 90 TABLET | Refills: 0 | Status: SHIPPED | OUTPATIENT
Start: 2022-01-11 | End: 2022-02-25

## 2022-01-11 RX ORDER — FUROSEMIDE 20 MG
20 TABLET ORAL DAILY
Qty: 3 TABLET | Refills: 0 | Status: SHIPPED | OUTPATIENT
Start: 2022-01-11 | End: 2022-02-08

## 2022-01-11 RX ORDER — HEPARIN SODIUM (PORCINE) LOCK FLUSH IV SOLN 100 UNIT/ML 100 UNIT/ML
5 SOLUTION INTRAVENOUS
Status: DISCONTINUED | OUTPATIENT
Start: 2022-01-11 | End: 2022-01-11 | Stop reason: HOSPADM

## 2022-01-11 RX ADMIN — SODIUM CHLORIDE 480 MG: 9 INJECTION, SOLUTION INTRAVENOUS at 10:18

## 2022-01-11 RX ADMIN — FAMOTIDINE 20 MG: 10 INJECTION, SOLUTION INTRAVENOUS at 10:02

## 2022-01-11 RX ADMIN — Medication 5 ML: at 11:49

## 2022-01-11 RX ADMIN — SODIUM CHLORIDE 250 ML: 9 INJECTION, SOLUTION INTRAVENOUS at 09:59

## 2022-01-11 ASSESSMENT — PAIN SCALES - GENERAL: PAINLEVEL: NO PAIN (0)

## 2022-01-11 NOTE — PROGRESS NOTES
Infusion Nursing Note:  Michaela Dorman presents today for Opivo.    Patient seen by provider today: No   present during visit today: Not Applicable.    Note: N/A.      Intravenous Access:  Implanted Port.    Treatment Conditions:  Lab Results   Component Value Date     01/10/2022    POTASSIUM 4.3 01/10/2022    MAG 1.5 (L) 11/18/2021    CR 1.03 01/10/2022    VANDANA 9.6 01/10/2022    BILITOTAL 0.2 01/10/2022    ALBUMIN 3.7 01/10/2022    ALT 27 01/10/2022    AST 19 01/10/2022     Results reviewed, labs MET treatment parameters, ok to proceed with treatment.      Post Infusion Assessment:  Patient tolerated infusion without incident.  Patient observed for 15 minutes post Opdivo.  Blood return noted pre and post infusion.  Site patent and intact, free from redness, edema or discomfort.  No evidence of extravasations.  Access discontinued per protocol.       Discharge Plan:   Discharge instructions reviewed with: Patient.  Patient discharged in stable condition accompanied by: self.  Departure Mode: Ambulatory.      Susanne Yu RN

## 2022-01-12 ENCOUNTER — VIRTUAL VISIT (OUTPATIENT)
Dept: OTHER | Facility: CLINIC | Age: 72
End: 2022-01-12
Attending: SURGERY
Payer: COMMERCIAL

## 2022-01-12 DIAGNOSIS — Z98.890 HISTORY OF LEFT-SIDED CAROTID ENDARTERECTOMY: Primary | ICD-10-CM

## 2022-01-12 PROCEDURE — 99213 OFFICE O/P EST LOW 20 MIN: CPT | Mod: 95 | Performed by: SURGERY

## 2022-01-12 NOTE — PROGRESS NOTES
Michaela is a 71 year old who is being evaluated via a billable telephone visit.      What phone number would you like to be contacted at? Home phone number [phone]  How would you like to obtain your AVS? Cardia    Phone call duration:

## 2022-01-12 NOTE — PROGRESS NOTES
Michaela Dorman is a 71-year-old female who is status post a left carotid endarterectomy on 10/8/2020 performed for an asymptomatic 80% left carotid stenosis.   Since her last visit she has been diagnosed with a high-grade urothelial carcinoma of the bladder.  She is status post radical cystectomy with ileal conduit creation on 6/1/2021.  She continues with adjuvant chemotherapy for this condition.    She had annual bilateral carotid ultrasound on 1/4/2022.  I am conducting a telephone interview with her today to discuss those results.  Over the past year she denies stroke, symptoms of TIA, or amaurosis.  At today's visit she had no complaints.    Imaging:  BILATERAL CAROTID ULTRASOUND January 4, 2022 1:35 PM      HISTORY: History of left carotid endarterectomy 10/8/20. History of  left-sided carotid endarterectomy. Carotid stenosis, bilateral.     COMPARISON: January 12, 2021, evaluation of the left carotid arterial  system.     RIGHT CAROTID FINDINGS:  Mild mixed atherosclerotic plaque at the  carotid bifurcation and proximal internal carotid artery.  Right ICA PSV:  108  cm/sec.  Right ICA EDV:  30 cm/sec.  Right ICA/CCA PSV Ratio:  1.57    These indicate less than 50% diameter stenosis of the right ICA.    Right Vertebral: Antegrade flow.   Right ECA: Antegrade flow.      LEFT CAROTID FINDINGS: There is minimal atherosclerotic plaque in the  carotid bifurcation and proximal internal carotid artery.  Left ICA PSV:  85  cm/sec.  Left ICA EDV:  29 cm/sec.  Left ICA/CCA PSV Ratio: 0.83    These indicate less than 50% diameter stenosis of the left ICA.    Left Vertebral: Antegrade flow.   Left ECA: Antegrade flow.      Causes of Decreased Accuracy: None.                                                                       IMPRESSION:    1. Less than 50% diameter stenosis of the right internal carotid  artery relative to the distal internal carotid artery diameter.   2. Less than 50% diameter stenosis of the left  internal carotid artery  relative to the distal internal carotid artery diameter.     JACKELIN LERMA MD          ASSESSMENT:  Status post left carotid endarterectomy clinically doing well with widely patent left carotid artery.  Minimal right-sided carotid disease.    RECOMMENDATION:  I reviewed all the above with Michaela.  I have no carotid concerns.  She will continue her medical regimen which includes daily aspirin.  Vascular surgical follow-up will be with me in 2 years for repeat bilateral carotid ultrasonography.    Total length of this telephone encounter was 11 minutes.    Cale Jasmine MD

## 2022-01-18 DIAGNOSIS — N18.31 STAGE 3A CHRONIC KIDNEY DISEASE (H): Primary | ICD-10-CM

## 2022-01-20 DIAGNOSIS — Z98.890 HISTORY OF LEFT-SIDED CAROTID ENDARTERECTOMY: Primary | ICD-10-CM

## 2022-01-20 DIAGNOSIS — I65.21 ASYMPTOMATIC STENOSIS OF RIGHT CAROTID ARTERY: ICD-10-CM

## 2022-01-29 DIAGNOSIS — C50.412 MALIGNANT NEOPLASM OF UPPER-OUTER QUADRANT OF LEFT BREAST IN FEMALE, ESTROGEN RECEPTOR NEGATIVE (H): Primary | ICD-10-CM

## 2022-01-29 DIAGNOSIS — Z17.1 MALIGNANT NEOPLASM OF UPPER-OUTER QUADRANT OF LEFT BREAST IN FEMALE, ESTROGEN RECEPTOR NEGATIVE (H): Primary | ICD-10-CM

## 2022-01-31 ENCOUNTER — PRE VISIT (OUTPATIENT)
Dept: UROLOGY | Facility: CLINIC | Age: 72
End: 2022-01-31
Payer: COMMERCIAL

## 2022-01-31 RX ORDER — LORAZEPAM 0.5 MG/1
TABLET ORAL
Qty: 30 TABLET | Refills: 2 | Status: SHIPPED | OUTPATIENT
Start: 2022-01-31 | End: 2022-05-27

## 2022-01-31 NOTE — CONFIDENTIAL NOTE
Reason for visit: Follow up w/ labs and imaging     Relevant information: Bladder cancer s/p cystectomy w/ ileal conduit 6/2021    Records/imaging/labs/orders: CT urogram, CXR on 2/9; BMP, CBC on 2/7    Pt called: N/A    At Rooming: Standard

## 2022-02-04 ENCOUNTER — HOSPITAL ENCOUNTER (OUTPATIENT)
Dept: CT IMAGING | Facility: CLINIC | Age: 72
Discharge: HOME | End: 2022-02-04
Attending: INTERNAL MEDICINE | Admitting: INTERNAL MEDICINE
Payer: MEDICARE

## 2022-02-04 DIAGNOSIS — C67.8 MALIGNANT NEOPLASM OF OVERLAPPING SITES OF BLADDER (H): ICD-10-CM

## 2022-02-04 PROCEDURE — 74177 CT ABD & PELVIS W/CONTRAST: CPT

## 2022-02-04 PROCEDURE — 250N000009 HC RX 250: Performed by: RADIOLOGY

## 2022-02-04 PROCEDURE — 250N000011 HC RX IP 250 OP 636: Performed by: RADIOLOGY

## 2022-02-04 RX ORDER — IOPAMIDOL 755 MG/ML
500 INJECTION, SOLUTION INTRAVASCULAR ONCE
Status: COMPLETED | OUTPATIENT
Start: 2022-02-04 | End: 2022-02-04

## 2022-02-04 RX ADMIN — SODIUM CHLORIDE 60 ML: 9 INJECTION, SOLUTION INTRAVENOUS at 09:41

## 2022-02-04 RX ADMIN — IOPAMIDOL 70 ML: 755 INJECTION, SOLUTION INTRAVENOUS at 09:42

## 2022-02-07 ENCOUNTER — LAB (OUTPATIENT)
Dept: LAB | Facility: CLINIC | Age: 72
End: 2022-02-07
Payer: COMMERCIAL

## 2022-02-07 DIAGNOSIS — F10.10 ETOH ABUSE: ICD-10-CM

## 2022-02-07 DIAGNOSIS — G89.4 CHRONIC PAIN SYNDROME: ICD-10-CM

## 2022-02-07 DIAGNOSIS — C67.8 MALIGNANT NEOPLASM OF OVERLAPPING SITES OF BLADDER (H): ICD-10-CM

## 2022-02-07 DIAGNOSIS — N18.31 STAGE 3A CHRONIC KIDNEY DISEASE (H): ICD-10-CM

## 2022-02-07 LAB
ALBUMIN SERPL-MCNC: 3.9 G/DL (ref 3.4–5)
ANION GAP SERPL CALCULATED.3IONS-SCNC: 5 MMOL/L (ref 3–14)
BUN SERPL-MCNC: 24 MG/DL (ref 7–30)
CALCIUM SERPL-MCNC: 9.7 MG/DL (ref 8.5–10.1)
CHLORIDE BLD-SCNC: 111 MMOL/L (ref 94–109)
CO2 SERPL-SCNC: 25 MMOL/L (ref 20–32)
CREAT SERPL-MCNC: 1.02 MG/DL (ref 0.52–1.04)
CREAT UR-MCNC: 94 MG/DL
ERYTHROCYTE [DISTWIDTH] IN BLOOD BY AUTOMATED COUNT: 13.1 % (ref 10–15)
GFR SERPL CREATININE-BSD FRML MDRD: 58 ML/MIN/1.73M2
GLUCOSE BLD-MCNC: 106 MG/DL (ref 70–99)
HCT VFR BLD AUTO: 34.1 % (ref 35–47)
HGB BLD-MCNC: 11.1 G/DL (ref 11.7–15.7)
MCH RBC QN AUTO: 32.8 PG (ref 26.5–33)
MCHC RBC AUTO-ENTMCNC: 32.6 G/DL (ref 31.5–36.5)
MCV RBC AUTO: 101 FL (ref 78–100)
PHOSPHATE SERPL-MCNC: 3.2 MG/DL (ref 2.5–4.5)
PLATELET # BLD AUTO: 255 10E3/UL (ref 150–450)
POTASSIUM BLD-SCNC: 4.6 MMOL/L (ref 3.4–5.3)
PROT UR-MCNC: 0.24 G/L
PROT/CREAT 24H UR: 0.26 G/G CR (ref 0–0.2)
PTH-INTACT SERPL-MCNC: 54 PG/ML (ref 18–80)
RBC # BLD AUTO: 3.38 10E6/UL (ref 3.8–5.2)
SODIUM SERPL-SCNC: 141 MMOL/L (ref 133–144)
WBC # BLD AUTO: 7.2 10E3/UL (ref 4–11)

## 2022-02-07 PROCEDURE — 82728 ASSAY OF FERRITIN: CPT | Performed by: INTERNAL MEDICINE

## 2022-02-07 PROCEDURE — 36415 COLL VENOUS BLD VENIPUNCTURE: CPT

## 2022-02-07 PROCEDURE — 85027 COMPLETE CBC AUTOMATED: CPT

## 2022-02-07 PROCEDURE — 83550 IRON BINDING TEST: CPT | Performed by: INTERNAL MEDICINE

## 2022-02-07 PROCEDURE — 80053 COMPREHEN METABOLIC PANEL: CPT

## 2022-02-07 PROCEDURE — 83970 ASSAY OF PARATHORMONE: CPT

## 2022-02-07 PROCEDURE — 84100 ASSAY OF PHOSPHORUS: CPT

## 2022-02-07 PROCEDURE — 84156 ASSAY OF PROTEIN URINE: CPT

## 2022-02-07 PROCEDURE — 84443 ASSAY THYROID STIM HORMONE: CPT | Performed by: INTERNAL MEDICINE

## 2022-02-07 RX ORDER — NALTREXONE HYDROCHLORIDE 50 MG/1
50 TABLET, FILM COATED ORAL DAILY
Qty: 30 TABLET | Refills: 0 | Status: SHIPPED | OUTPATIENT
Start: 2022-02-07 | End: 2022-05-11

## 2022-02-07 RX ORDER — OXYCODONE AND ACETAMINOPHEN 5; 325 MG/1; MG/1
TABLET ORAL
Qty: 30 TABLET | Refills: 0 | Status: SHIPPED | OUTPATIENT
Start: 2022-02-07 | End: 2022-03-18

## 2022-02-08 ENCOUNTER — VIRTUAL VISIT (OUTPATIENT)
Dept: NEPHROLOGY | Facility: CLINIC | Age: 72
End: 2022-02-08
Payer: COMMERCIAL

## 2022-02-08 VITALS
WEIGHT: 146 LBS | BODY MASS INDEX: 23.46 KG/M2 | HEIGHT: 66 IN | SYSTOLIC BLOOD PRESSURE: 107 MMHG | DIASTOLIC BLOOD PRESSURE: 64 MMHG

## 2022-02-08 DIAGNOSIS — D64.9 ANEMIA, UNSPECIFIED TYPE: Primary | ICD-10-CM

## 2022-02-08 DIAGNOSIS — C67.9 UROTHELIAL CARCINOMA OF BLADDER WITH INVASION OF MUSCLE (H): ICD-10-CM

## 2022-02-08 DIAGNOSIS — I95.9 HYPOTENSION, UNSPECIFIED HYPOTENSION TYPE: ICD-10-CM

## 2022-02-08 DIAGNOSIS — N18.31 STAGE 3A CHRONIC KIDNEY DISEASE (H): ICD-10-CM

## 2022-02-08 PROCEDURE — 99214 OFFICE O/P EST MOD 30 MIN: CPT | Mod: 95 | Performed by: INTERNAL MEDICINE

## 2022-02-08 ASSESSMENT — MIFFLIN-ST. JEOR: SCORE: 1189

## 2022-02-08 ASSESSMENT — PAIN SCALES - GENERAL: PAINLEVEL: NO PAIN (0)

## 2022-02-08 NOTE — PATIENT INSTRUCTIONS
1. Recommend minimizing sodium intake (ideally less than 2500 mg per day)  2. Recommend 6 month follow-up or sooner as needed.

## 2022-02-08 NOTE — PROGRESS NOTES
Michaela is a 71 year old who is being evaluated via a billable video visit.      How would you like to obtain your AVS? MyChart  If the video visit is dropped, the invitation should be resent by: Text to cell phone: [phone]  Will anyone else be joining your video visit? No

## 2022-02-08 NOTE — PROGRESS NOTES
02/08/2022    CC: elevated creatinine    HPI: Michaela Dorman is a 71 year old  female who presents for evaluation of elevated creatinine.  Jan 5, 2021:  Ms. Jean has had difficulties with recurrent UTIs- on/off for years.  - referred to urology as well - started on bactrim single strength for prophylaxis in late November. Plan in November was to follow-up with Dr. Shaw in urology again and at that time will undergo cystoscopy as well. On review of her creatinine levels, her true baseline seems to be around 0.8 but with episodes of elevation (up to 1.2 in 2012, 1.45 in May 2019, more recently sitting 1.1-1.2 this year). On review of UAs, she has had hematuria; UMaCR typically normal but 40 mg/g in Dec 2020.    Today she reports that she has stopped the bactrim due to ongoing infections despite being on it; off for one week. She is now on ciprofloxacin; also recent difficulties with yeast infections. BP most recently has been low. She feels weak/dizzy with these low pressures - by afternoon is able to walk 3 miles. She is off of hydrochlorothiazide now; 162/75 yesterday later in the day so did take a tab. She is eating/drinking well. No fever/chills. She feels the UTI is gone at this time. No diarrhea. No irregular heart rate appreciated. She reports low blood pressures for years on/off - now 3-4 months with very low pressures.    She is taking celebrex for back pain - had an extensive back surgery in early 2020. Currently taking celebrex once a day. Prior to 2020 was not taking NSAIDs. On prevacid for heart burn - has been on this for long time - no heart burn sxs for a long time. No hx of ulcers/esophagitis. Takes miralax daily. Takes magnesium daily. Addt hx breast cancer with mastectomy/chemo/rx.   - History of Hematuria: no  - Swelling: no  - Hx of UTIs: yes - see above  - Hx of stones: no  - Rashes/Joint pain: no rash; back pain present - hx of surgery  - Family hx of kidney disease: no  - NSAID use: no  other NSAIDs; just celebrex    02/08/21: video visit. At the time of her initial visit, I recommended stopping celebrex, trial stopping prevacid, staying off of hydrochlorothiazide. Creatinine was 1.2 just after that visit, improved to 0.96 on 1/21 and is now 1.12 most recently on 2/3/21. She unfortunately has had ongoing difficulties with urinary frequency, urgency, and dysuria. Bactrim was sent in 2/4/21. She has a cystoscopy planned with  on 2/22/21 with a CT at that time as well. Her cultures have only shown mixed urogenital cher most recently.    Today she reports that she remains on celebrex - was on BID and now daily. She is following with Phani Tapia PA-C for her pain. BP has been good - at least 90/60, 100/62. It is this low despite being off of all BP meds. Heart rate has been 95. She is walking and exercising quite a bit: walking 3 miles per day and elliptical. MWF she also does weights. She feels she is drinking a lot of water/staying hydrated. Early AM dizziness. She tried stopping prevacid after last visit but she had heart burn sxs return - pepcid did not give the relief.      5/3/21: since last visit she was seen by urology and dx with high grade urothelial cancer. She underwent transurethral resection of the bladder tumor in March and was started on cisplatin on 4/20/21. She reports that she was on AZO for all of October and on it up until 3/18. She has an TAMIR now - reports diarrhea started Friday but stopped Sunday afternoon. She admits she has been taking celebrex up until 4/29. She reports that she is no longer with diarrhea and has had good hydration over the weekend.     8/10/21:  Phone visit. (She was unable to get the video software to work.)  Since her last nephrology appointment she has undergone radical cystectomy and ileal conduit creation. Her medical oncologist is currently holding off on adjuvant chemotherapy pending further imaging.  She reports no current issues. She feels  that her urine is about the same as before both in amount and in color. She reports that she keeps well hydrated, 8 cups of water per day.  Recently got a second opinion on her cancer at [facility], CT urogram with contrast on 8/4.  No swelling, no difficulty breathing, no palpitations, no change in BM.    02/08/22: video visit. On opdivo currently. Has a CT urogram scheduled for tomorrow. She reports doing well overall. She has some side effects but feels they are controllable. Fatigue, nausea, feeling cold are main side effects. Some HAs but takes tylenol. No NSAIDs. She reports a little bit of swelling but not bad per her report. She reports some weight gain - 8 lbs over a few weeks but this has leveled off. She remains very active - exercises 2 hours per day. She does not find that the swelling is bothersome but does feel she retains water. She will note a sock line that resolves by AM. Her shoes are not tight. BP has been good - Not less than 100. She does add salt to her foods.        Allergies   Allergen Reactions     Oxybutynin      Patient reports symptoms of nausea and mind fogginess       acetaminophen (TYLENOL) 500 MG tablet, Take 1,000 mg by mouth 3 times daily as needed for mild pain (500MG X 2 = 1,000MG)  ALPRAZolam (XANAX) 0.5 MG tablet, TAKE 1 TABLET BY MOUTH DAILY AT BEDTIME AS NEEDED FOR SLEEP  aspirin (ASA) 81 MG chewable tablet, Take 1 tablet (81 mg) by mouth daily  atorvastatin (LIPITOR) 20 MG tablet, TAKE 1 TABLET BY MOUTH EVERY DAY  carvedilol (COREG) 6.25 MG tablet, Take 1 tablet (6.25 mg) by mouth 2 times daily (with meals)  Cyanocobalamin (B-12 PO),   escitalopram (LEXAPRO) 10 MG tablet, TAKE 1 TABLET BY MOUTH EVERY DAY WITH 20MG TABLETS (Patient taking differently: Take 10 mg by mouth daily Takes with 20 mg tablet for total daily dose of 30 mg in the morning.)  escitalopram (LEXAPRO) 20 MG tablet, TAKE 1 TABLET BY MOUTH EVERY DAY WITH 10MG TABLETS  levothyroxine (SYNTHROID/LEVOTHROID) 25 MCG  "tablet, TAKE 1 TABLET (25 MCG) BY MOUTH DAILY  LORazepam (ATIVAN) 0.5 MG tablet, TAKE 1 TABLET BY MOUHT EVERY 4 HOURS AS NEEDED FORANXIETY, NAUSEA, VOMITING OR SLEEP  meclizine 25 MG CHEW, Take 25 mg by mouth every 6 hours as needed for dizziness   methocarbamol (ROBAXIN) 750 MG tablet, Take 1 tablet (750 mg) by mouth 4 times daily as needed for muscle spasms (or abdominal pain)  naltrexone (DEPADE/REVIA) 50 MG tablet, TAKE 1 TABLET (50 MG) BY MOUTH DAILY  nivolumab (OPDIVO) 100 MG/10ML,   omeprazole (PRILOSEC) 20 MG DR capsule, Take 20 mg by mouth daily  oxyCODONE-acetaminophen (PERCOCET) 5-325 MG tablet, TAKE 0.5-1 TABLETS BY MOUTHEVERY 6 HOURS AS NEEDED FORSEVERE PAIN  polyethylene glycol (MIRALAX/GLYCOLAX) powder, Take 17 g by mouth every morning   Probiotic Product (PROBIOTIC PO),   prochlorperazine (COMPAZINE) 10 MG tablet, TAKE 1/2 TABLET (5 MG) BY MOUTH EVERY 6 HOURS AS NEEDED (NAUSEA/VOMITING)  SENNA-docusate sodium (SENNA S) 8.6-50 MG tablet, Take 1 tablet by mouth At Bedtime  STOOL SOFTENER/LAXATIVE 50-8.6 MG tablet, TAKE 2 TABLETS BY MOUTH DAILY AT BEDTIME  furosemide (LASIX) 20 MG tablet, Take 1 tablet (20 mg) by mouth daily for 3 days    No current facility-administered medications on file prior to visit.    Exam:  /64   Ht 1.676 m (5' 6\")   Wt 66.2 kg (146 lb)   BMI 23.57 kg/m    Gen - alert and oriented.       Results:   Last Comprehensive Metabolic Panel:  Sodium   Date Value Ref Range Status   02/07/2022 141 133 - 144 mmol/L Final   06/24/2021 135 133 - 144 mmol/L Final     Potassium   Date Value Ref Range Status   02/07/2022 4.6 3.4 - 5.3 mmol/L Final   06/24/2021 4.7 3.4 - 5.3 mmol/L Final     Chloride   Date Value Ref Range Status   02/07/2022 111 (H) 94 - 109 mmol/L Final   06/24/2021 106 94 - 109 mmol/L Final     Carbon Dioxide   Date Value Ref Range Status   06/24/2021 24 20 - 32 mmol/L Final     Carbon Dioxide (CO2)   Date Value Ref Range Status   02/07/2022 25 20 - 32 mmol/L Final "     Anion Gap   Date Value Ref Range Status   02/07/2022 5 3 - 14 mmol/L Final   06/24/2021 6 3 - 14 mmol/L Final     Glucose   Date Value Ref Range Status   02/07/2022 106 (H) 70 - 99 mg/dL Final   06/24/2021 100 (H) 70 - 99 mg/dL Final     Urea Nitrogen   Date Value Ref Range Status   02/07/2022 24 7 - 30 mg/dL Final   06/24/2021 30 7 - 30 mg/dL Final     Creatinine   Date Value Ref Range Status   02/07/2022 1.02 0.52 - 1.04 mg/dL Final   06/24/2021 1.10 (H) 0.52 - 1.04 mg/dL Final     GFR Estimate   Date Value Ref Range Status   02/07/2022 58 (L) >60 mL/min/1.73m2 Final     Comment:     Effective December 21, 2021 eGFRcr in adults is calculated using the 2021 CKD-EPI creatinine equation which includes age and gender (Benny et al., NEJ, DOI: 10.1056/XZXExu2923571)   06/24/2021 50 (L) >60 mL/min/[1.73_m2] Final     Comment:     Non  GFR Calc  Starting 12/18/2018, serum creatinine based estimated GFR (eGFR) will be   calculated using the Chronic Kidney Disease Epidemiology Collaboration   (CKD-EPI) equation.       GFR, ESTIMATED POCT   Date Value Ref Range Status   11/18/2021 50 (L) >60 mL/min/1.73m2 Final     Calcium   Date Value Ref Range Status   02/07/2022 9.7 8.5 - 10.1 mg/dL Final   06/24/2021 9.6 8.5 - 10.1 mg/dL Final       Assessment/Plan:   1. CKD STage 3: at risk for CKD in the setting of hypotension, NSAID use, PPI use, recurrent UTIs, previous TAMIR events.  Although not normal, pleased to see that her kidney function has stabilized a bit. She is scheduled for CT with contrast again tomorrow. Stressed importance of hydration pre and post contrast exposure with hopes of minimizing risk of contrast induced injury. She had contrast exposure last week as well. Ideally would minimize/space out contrast exposure in the future if able - will communicate with other providers.     2. Hypotension: resolved    3. Anemia: hemoglobin 11.1 - stable. Will add on iron studies.     4. Edema: she reports  edema in her legs especially at end of the day. She received lasix 20 mg daily for 3 days last month but she did not appreciate an increase in urine output or improvement in swelling. On discussion wtwih her today, she does not minimize salt intake and does add to her foods. Educated to minimize sodium intake in her diet with hopes of minimizing water retention. Difficulty in evaluating this through video today. Edema is noted as a potential side effect of opdivo as well.     5. Urothelial cancer: follows with Dr. Robles and Dr. Sauer    [id number] AM Video/telephone visit. We had difficulties with our video connection so ended up with telephone for majority of the visit as well.     35 minutes spent on the date of the encounter doing chart review, review of test results, interpretation of tests, patient visit and documentation   Patient Instructions   1. Recommend minimizing sodium intake (ideally less than 2500 mg per day)  2. Recommend 6 month follow-up or sooner as needed.      Akiko Smith, DO

## 2022-02-09 ENCOUNTER — INFUSION THERAPY VISIT (OUTPATIENT)
Dept: INFUSION THERAPY | Facility: CLINIC | Age: 72
End: 2022-02-09
Attending: PHYSICIAN ASSISTANT
Payer: MEDICARE

## 2022-02-09 ENCOUNTER — HOSPITAL ENCOUNTER (OUTPATIENT)
Dept: GENERAL RADIOLOGY | Facility: CLINIC | Age: 72
End: 2022-02-09
Attending: UROLOGY
Payer: MEDICARE

## 2022-02-09 ENCOUNTER — HOSPITAL ENCOUNTER (OUTPATIENT)
Dept: CT IMAGING | Facility: CLINIC | Age: 72
End: 2022-02-09
Attending: UROLOGY
Payer: MEDICARE

## 2022-02-09 DIAGNOSIS — C67.8 MALIGNANT NEOPLASM OF OVERLAPPING SITES OF BLADDER (H): Primary | ICD-10-CM

## 2022-02-09 DIAGNOSIS — C77.5 SECONDARY AND UNSPECIFIED MALIGNANT NEOPLASM OF INTRAPELVIC LYMPH NODES (H): ICD-10-CM

## 2022-02-09 DIAGNOSIS — C67.8 MALIGNANT NEOPLASM OF OVERLAPPING SITES OF BLADDER (H): ICD-10-CM

## 2022-02-09 LAB
ALBUMIN SERPL-MCNC: 3.8 G/DL (ref 3.4–5)
ALP SERPL-CCNC: 56 U/L (ref 40–150)
ALT SERPL W P-5'-P-CCNC: 33 U/L (ref 0–50)
ANION GAP SERPL CALCULATED.3IONS-SCNC: 8 MMOL/L (ref 3–14)
AST SERPL W P-5'-P-CCNC: 22 U/L (ref 0–45)
BILIRUB SERPL-MCNC: 0.2 MG/DL (ref 0.2–1.3)
BUN SERPL-MCNC: 22 MG/DL (ref 7–30)
CALCIUM SERPL-MCNC: 9.7 MG/DL (ref 8.5–10.1)
CHLORIDE BLD-SCNC: 111 MMOL/L (ref 94–109)
CO2 SERPL-SCNC: 23 MMOL/L (ref 20–32)
CREAT SERPL-MCNC: 1.02 MG/DL (ref 0.52–1.04)
FERRITIN SERPL-MCNC: 68 NG/ML (ref 8–252)
GFR SERPL CREATININE-BSD FRML MDRD: 58 ML/MIN/1.73M2
GLUCOSE BLD-MCNC: 107 MG/DL (ref 70–99)
IRON SATN MFR SERPL: 17 % (ref 15–46)
IRON SERPL-MCNC: 57 UG/DL (ref 35–180)
POTASSIUM BLD-SCNC: 4.6 MMOL/L (ref 3.4–5.3)
PROT SERPL-MCNC: 7.1 G/DL (ref 6.8–8.8)
SODIUM SERPL-SCNC: 142 MMOL/L (ref 133–144)
TIBC SERPL-MCNC: 335 UG/DL (ref 240–430)
TSH SERPL DL<=0.005 MIU/L-ACNC: 3.95 MU/L (ref 0.4–4)

## 2022-02-09 PROCEDURE — 250N000011 HC RX IP 250 OP 636: Performed by: UROLOGY

## 2022-02-09 PROCEDURE — 71046 X-RAY EXAM CHEST 2 VIEWS: CPT

## 2022-02-09 PROCEDURE — 250N000011 HC RX IP 250 OP 636: Performed by: INTERNAL MEDICINE

## 2022-02-09 PROCEDURE — 250N000009 HC RX 250: Performed by: UROLOGY

## 2022-02-09 PROCEDURE — 74178 CT ABD&PLV WO CNTR FLWD CNTR: CPT

## 2022-02-09 RX ORDER — HEPARIN SODIUM (PORCINE) LOCK FLUSH IV SOLN 100 UNIT/ML 100 UNIT/ML
5 SOLUTION INTRAVENOUS
Status: CANCELLED | OUTPATIENT
Start: 2022-02-09

## 2022-02-09 RX ORDER — IOPAMIDOL 755 MG/ML
500 INJECTION, SOLUTION INTRAVASCULAR ONCE
Status: COMPLETED | OUTPATIENT
Start: 2022-02-09 | End: 2022-02-09

## 2022-02-09 RX ORDER — HEPARIN SODIUM (PORCINE) LOCK FLUSH IV SOLN 100 UNIT/ML 100 UNIT/ML
5 SOLUTION INTRAVENOUS
Status: DISCONTINUED | OUTPATIENT
Start: 2022-02-09 | End: 2022-02-09 | Stop reason: HOSPADM

## 2022-02-09 RX ADMIN — IOPAMIDOL 100 ML: 755 INJECTION, SOLUTION INTRAVENOUS at 14:31

## 2022-02-09 RX ADMIN — SODIUM CHLORIDE 130 ML: 9 INJECTION, SOLUTION INTRAVENOUS at 14:30

## 2022-02-09 RX ADMIN — Medication 5 ML: at 15:16

## 2022-02-09 NOTE — PROGRESS NOTES
Infusion Nursing Note:  Michaela Dorman presents today port access for CT.    Patient seen by provider today: No   present during visit today: Not Applicable.    Note: Patient here today for port access for CT. Pt had her labs done on 02/07 so those were not repeated today.       Intravenous Access:  Implanted Port.    Treatment Conditions:  Lab Results   Component Value Date    HGB 11.1 (L) 02/07/2022    WBC 7.2 02/07/2022    ANEU 3.5 06/01/2021    ANEUTAUTO 3.8 11/18/2021     02/07/2022      Lab Results   Component Value Date     02/07/2022     02/07/2022    POTASSIUM 4.6 02/07/2022    POTASSIUM 4.6 02/07/2022    MAG 1.5 (L) 11/18/2021    CR 1.02 02/07/2022    CR 1.02 02/07/2022    VANDANA 9.7 02/07/2022    VANDANA 9.7 02/07/2022    BILITOTAL 0.2 02/07/2022    ALBUMIN 3.9 02/07/2022    ALBUMIN 3.8 02/07/2022    ALT 33 02/07/2022    AST 22 02/07/2022         Post Infusion Assessment:  Blood return noted pre and post infusion.  Site patent and intact, free from redness, edema or discomfort.  No evidence of extravasations.  Access discontinued per protocol.       Discharge Plan:   Patient discharged in stable condition accompanied by: self.  Departure Mode: Ambulatory.      Charlotte Coronel RN

## 2022-02-10 ENCOUNTER — INFUSION THERAPY VISIT (OUTPATIENT)
Dept: INFUSION THERAPY | Facility: CLINIC | Age: 72
End: 2022-02-10
Attending: INTERNAL MEDICINE
Payer: MEDICARE

## 2022-02-10 ENCOUNTER — VIRTUAL VISIT (OUTPATIENT)
Dept: ONCOLOGY | Facility: CLINIC | Age: 72
End: 2022-02-10
Payer: COMMERCIAL

## 2022-02-10 VITALS — DIASTOLIC BLOOD PRESSURE: 66 MMHG | SYSTOLIC BLOOD PRESSURE: 117 MMHG | HEART RATE: 63 BPM

## 2022-02-10 VITALS
DIASTOLIC BLOOD PRESSURE: 58 MMHG | OXYGEN SATURATION: 97 % | TEMPERATURE: 98.5 F | BODY MASS INDEX: 23.18 KG/M2 | SYSTOLIC BLOOD PRESSURE: 110 MMHG | HEART RATE: 99 BPM | HEIGHT: 66 IN | WEIGHT: 144.2 LBS

## 2022-02-10 DIAGNOSIS — Z17.1 MALIGNANT NEOPLASM OF UPPER-OUTER QUADRANT OF LEFT BREAST IN FEMALE, ESTROGEN RECEPTOR NEGATIVE (H): ICD-10-CM

## 2022-02-10 DIAGNOSIS — C50.412 MALIGNANT NEOPLASM OF UPPER-OUTER QUADRANT OF LEFT BREAST IN FEMALE, ESTROGEN RECEPTOR NEGATIVE (H): ICD-10-CM

## 2022-02-10 DIAGNOSIS — D64.9 NORMOCYTIC ANEMIA: ICD-10-CM

## 2022-02-10 DIAGNOSIS — C67.8 MALIGNANT NEOPLASM OF OVERLAPPING SITES OF BLADDER (H): Primary | ICD-10-CM

## 2022-02-10 DIAGNOSIS — C67.8 MALIGNANT NEOPLASM OF OVERLAPPING SITES OF BLADDER (H): ICD-10-CM

## 2022-02-10 DIAGNOSIS — E83.42 HYPOMAGNESEMIA: ICD-10-CM

## 2022-02-10 DIAGNOSIS — Z51.11 ENCOUNTER FOR ANTINEOPLASTIC CHEMOTHERAPY: ICD-10-CM

## 2022-02-10 DIAGNOSIS — E83.42 HYPOMAGNESEMIA: Primary | ICD-10-CM

## 2022-02-10 DIAGNOSIS — C77.5 SECONDARY AND UNSPECIFIED MALIGNANT NEOPLASM OF INTRAPELVIC LYMPH NODES (H): ICD-10-CM

## 2022-02-10 DIAGNOSIS — R51.9 BILATERAL HEADACHES: ICD-10-CM

## 2022-02-10 PROCEDURE — 36415 COLL VENOUS BLD VENIPUNCTURE: CPT

## 2022-02-10 PROCEDURE — 250N000009 HC RX 250: Performed by: INTERNAL MEDICINE

## 2022-02-10 PROCEDURE — 250N000011 HC RX IP 250 OP 636: Performed by: INTERNAL MEDICINE

## 2022-02-10 PROCEDURE — 258N000003 HC RX IP 258 OP 636: Performed by: INTERNAL MEDICINE

## 2022-02-10 PROCEDURE — 96413 CHEMO IV INFUSION 1 HR: CPT

## 2022-02-10 PROCEDURE — 99214 OFFICE O/P EST MOD 30 MIN: CPT | Mod: 95 | Performed by: INTERNAL MEDICINE

## 2022-02-10 PROCEDURE — 96375 TX/PRO/DX INJ NEW DRUG ADDON: CPT

## 2022-02-10 RX ORDER — MEPERIDINE HYDROCHLORIDE 25 MG/ML
25 INJECTION INTRAMUSCULAR; INTRAVENOUS; SUBCUTANEOUS EVERY 30 MIN PRN
Status: CANCELLED | OUTPATIENT
Start: 2022-05-04

## 2022-02-10 RX ORDER — HEPARIN SODIUM (PORCINE) LOCK FLUSH IV SOLN 100 UNIT/ML 100 UNIT/ML
5 SOLUTION INTRAVENOUS
Status: DISCONTINUED | OUTPATIENT
Start: 2022-02-10 | End: 2022-02-10 | Stop reason: HOSPADM

## 2022-02-10 RX ORDER — LORAZEPAM 2 MG/ML
0.5 INJECTION INTRAMUSCULAR EVERY 4 HOURS PRN
Status: CANCELLED | OUTPATIENT
Start: 2022-05-04

## 2022-02-10 RX ORDER — NALOXONE HYDROCHLORIDE 0.4 MG/ML
0.2 INJECTION, SOLUTION INTRAMUSCULAR; INTRAVENOUS; SUBCUTANEOUS
Status: CANCELLED | OUTPATIENT
Start: 2022-05-04

## 2022-02-10 RX ORDER — HEPARIN SODIUM,PORCINE 10 UNIT/ML
5 VIAL (ML) INTRAVENOUS
Status: CANCELLED | OUTPATIENT
Start: 2022-05-04

## 2022-02-10 RX ORDER — EPINEPHRINE 1 MG/ML
0.3 INJECTION, SOLUTION, CONCENTRATE INTRAVENOUS EVERY 5 MIN PRN
Status: CANCELLED | OUTPATIENT
Start: 2022-05-04

## 2022-02-10 RX ORDER — DIPHENHYDRAMINE HYDROCHLORIDE 50 MG/ML
50 INJECTION INTRAMUSCULAR; INTRAVENOUS
Status: CANCELLED
Start: 2022-05-04

## 2022-02-10 RX ORDER — HEPARIN SODIUM (PORCINE) LOCK FLUSH IV SOLN 100 UNIT/ML 100 UNIT/ML
5 SOLUTION INTRAVENOUS
Status: CANCELLED | OUTPATIENT
Start: 2022-05-04

## 2022-02-10 RX ORDER — ALBUTEROL SULFATE 0.83 MG/ML
2.5 SOLUTION RESPIRATORY (INHALATION)
Status: CANCELLED | OUTPATIENT
Start: 2022-05-04

## 2022-02-10 RX ORDER — METHYLPREDNISOLONE SODIUM SUCCINATE 125 MG/2ML
125 INJECTION, POWDER, LYOPHILIZED, FOR SOLUTION INTRAMUSCULAR; INTRAVENOUS
Status: CANCELLED
Start: 2022-05-04

## 2022-02-10 RX ORDER — ALBUTEROL SULFATE 90 UG/1
1-2 AEROSOL, METERED RESPIRATORY (INHALATION)
Status: CANCELLED
Start: 2022-05-04

## 2022-02-10 RX ADMIN — Medication 5 ML: at 11:03

## 2022-02-10 RX ADMIN — SODIUM CHLORIDE 480 MG: 9 INJECTION, SOLUTION INTRAVENOUS at 10:14

## 2022-02-10 RX ADMIN — SODIUM CHLORIDE 250 ML: 9 INJECTION, SOLUTION INTRAVENOUS at 09:57

## 2022-02-10 RX ADMIN — FAMOTIDINE 20 MG: 10 INJECTION, SOLUTION INTRAVENOUS at 10:11

## 2022-02-10 ASSESSMENT — MIFFLIN-ST. JEOR: SCORE: 1180.84

## 2022-02-10 ASSESSMENT — PAIN SCALES - GENERAL: PAINLEVEL: NO PAIN (0)

## 2022-02-10 NOTE — PATIENT INSTRUCTIONS
1) Brain MRI per Michaela's schedule.  2) Opdivo w/ provider and labs every 4 weeks Feb-May.  3) BJT w/ May Opdivo and CT CAP prior to visit.    Girish Sauer MD.    Today:  MRI soon!    Please follow up with Dr. Sauer in May with Opdivo infusion.  Please schedule CT 1-5 days prior to follow up appointment.    Port access Date/Time:2/16/22 @1:00pm  MRI Date/Time: 2/16/22@2:00pm    Lab Date/Time: 3/10/22 @12:30pm  Infusion Date/Time: 3/10/22 @1:00pm    Lab Date/Time: 4/7/22 @12:30pm  Infusion Date/Time: 4/7/22 @1:00pm    Port access Date/Time: 5/2/22 @11:30am  CT Scan Date/Time: 5/2/22 @12:10pm    Lab Date/Time: 5/5/22 @9:30am    Office visit follow up with Dr. Sauer Date/Time: 5/5/22 @10:00am     Infusion to follow at: 5/5/22 @10:30AM    If you have any questions or concerns please feel free to call.    If you need to reschedule please call:  Clinic or Lab Appointment - [phone]  Infusion - [phone]  Imaging - [phone]    Veronica Hale [facility]  [phone]

## 2022-02-10 NOTE — LETTER
2/10/2022         RE: Michaela Dorman  [address]  [place] [facility] [address]        Dear Colleague,    Thank you for referring your patient, Michaela Dorman, to the [facility] [place]. Please see a copy of my visit note below.    [facility] Hematology / Oncology  Progress Note  Name: Michaela Dorman  :  1950    MRN:  [id number]    --------------------    Assessment / Plan:  Left-sided ER-, HER2+ breast CA:  # Sep 2016 Presented w/ left axillary mass; staging multicentric T4 lesion w/ geraldine involvement; biopsy w/ ER-, HER2+ breast cancer  # Sep 2016 - 2017 Neoadjuvant AC x 4 cycles/TH x 4 cycles.  # 2017 Bilateral mastectomy no with geraldine evaluation; complete path CR from invasive cancer; 7 mm DCIS residual.  # 2017 - May 2017 Adjuvant radiation to left chestwall / axilla.  # 2017 - Dec 2017 Adjuvant Herceptin.    Bladder cancer:([id number])  # Oct 2020 Presented w/ dysuria.  # 2021 Cytoscopy w/ muscle-invasive high grade urothelial cancer.  # Mar 2021 TURBT.  # 2021 Neoadjuvant cisplatin/gem split dose complicated by TAMIR.  # May 2021 Neoadjuvant carboplatin/gem.  # 2021 Radical cystectomy w/ ileal conduit; path high grade urothelial carcinoma muscle invasive; margins negs; nodes negative.  # 2021 Adjuvant opdivo; planning 12 months.    Clinically well.  Personally reviewed surveillance CT CAP; DAGOBERTO; reviewed incidental findings; ovarian cyst improved.  Proceed w/ cycle 4 Opdivo; planning surveillance scans every 3 months while on adjuvant Opdivo.  Planning 12 months adjuvant Opdivo through 2022.  Obtain brain MRI due to new / different headaches and evaluate for CNS lesion.  Reviewed supportive cares for dermatitis / rash; topical lotions and antihistamines.  Fluid retention suspect relates to post-op lymphedema; continue prn lasix and compression; resolution overnight reassuring.    Girish Sauer,  MD    --------------------    Interval History:  Michaela returns for follow up bladder and breast cancer.  No rashes; some itching since starting Opdivo; improved w/ Claritin.  Exercising regularly; three days a week she lifts weights x 90 mins; every day walks 3 miles.  Continues to ride horses.  Headaches ongoing; come out of the blue; frontal bilaterally; eyes hurt from time to time; prone to headaches lifelong; concussions and herniated disk; these headaches different.  Some trace fluid retention in ankles and abdomen worse at the end of the days, better in the AM.  Some mild muscle aches following exercise and walking.  No diarrhea; tends towards constipation.  No jaundice, nausea, vomiting; does use some compazine.  Appetite, weight and energy at baseline.    --------------------    Physical Exam:  Video visit.    Labs / Imaging / Path:  We reviewed labs.    Video Visit:  Michaela is a 71 year old female who is being evaluated via a billable video visit.  }    Video start time: 9:04 AM  Video end time: 9:22 AM    Provider location: Off-site.  Patient location: Home    Mode of transmission: [employer].        Again, thank you for allowing me to participate in the care of your patient.        Sincerely,        Girish Sauer MD

## 2022-02-10 NOTE — PROGRESS NOTES
Infusion Nursing Note:  Michaela Dorman presents today for Opdivo.    Patient seen by provider today: Yes: \virtual visit w/ dr Sauer   present during visit today: Not Applicable.    Note: N/A.      Intravenous Access:  Implanted Port.    Treatment Conditions:  Lab Results   Component Value Date     02/07/2022     02/07/2022    POTASSIUM 4.6 02/07/2022    POTASSIUM 4.6 02/07/2022    MAG 1.5 (L) 11/18/2021    CR 1.02 02/07/2022    CR 1.02 02/07/2022    AVNDANA 9.7 02/07/2022    VANDANA 9.7 02/07/2022    BILITOTAL 0.2 02/07/2022    ALBUMIN 3.9 02/07/2022    ALBUMIN 3.8 02/07/2022    ALT 33 02/07/2022    AST 22 02/07/2022     Results reviewed, labs MET treatment parameters, ok to proceed with treatment.      Post Infusion Assessment:  Patient tolerated infusion without incident.  Patient observed for 15 minutes post Opdivo.  Blood return noted pre and post infusion.  Site patent and intact, free from redness, edema or discomfort.  No evidence of extravasations.  Access discontinued per protocol.       Discharge Plan:   Discharge instructions reviewed with: Patient.  Patient discharged in stable condition accompanied by: self.  Departure Mode: Ambulatory.      Susanne Yu RN

## 2022-02-10 NOTE — PROGRESS NOTES
DISCHARGE PLAN:  Next appointments: See patient instruction section  VIDEO VISIT    0 minutes for nursing discharge (face to face time)     Michaela Dorman is here today for Follow up.  Patient was not seen by writing nurse at time of appointment. Patient to schedule appointments. Staff reminder sent.  See patient instructions and Oncologist's Progress note for further details. Questions and concerns addressed to patient's satisfaction. Patient verbalized and demonstrated understanding of plan.  Contact information provided and patient is encouraged to call with any that arise in the interim of care.    Carol Rollins RN  [facility]   [phone]  2/10/2022, 9:46 AM

## 2022-02-10 NOTE — PROGRESS NOTES
[facility] Hematology / Oncology  Progress Note  Name: Michaela Dorman  :  1950    MRN:  [id number]    --------------------    Assessment / Plan:  Left-sided ER-, HER2+ breast CA:  # Sep 2016 Presented w/ left axillary mass; staging multicentric T4 lesion w/ geraldine involvement; biopsy w/ ER-, HER2+ breast cancer  # Sep 2016 - 2017 Neoadjuvant AC x 4 cycles/TH x 4 cycles.  # 2017 Bilateral mastectomy no with geraldine evaluation; complete path CR from invasive cancer; 7 mm DCIS residual.  # 2017 - May 2017 Adjuvant radiation to left chestwall / axilla.  # 2017 - Dec 2017 Adjuvant Herceptin.    Bladder cancer:([id number])  # Oct 2020 Presented w/ dysuria.  # 2021 Cytoscopy w/ muscle-invasive high grade urothelial cancer.  # Mar 2021 TURBT.  # 2021 Neoadjuvant cisplatin/gem split dose complicated by TAMIR.  # May 2021 Neoadjuvant carboplatin/gem.  # 2021 Radical cystectomy w/ ileal conduit; path high grade urothelial carcinoma muscle invasive; margins negs; nodes negative.  # 2021 Adjuvant opdivo; planning 12 months.    Clinically well.  Personally reviewed surveillance CT CAP; DAGOBERTO; reviewed incidental findings; ovarian cyst improved.  Proceed w/ cycle 4 Opdivo; planning surveillance scans every 3 months while on adjuvant Opdivo.  Planning 12 months adjuvant Opdivo through 2022.  Obtain brain MRI due to new / different headaches and evaluate for CNS lesion.  Reviewed supportive cares for dermatitis / rash; topical lotions and antihistamines.  Fluid retention suspect relates to post-op lymphedema; continue prn lasix and compression; resolution overnight reassuring.    Girish Sauer MD    --------------------    Interval History:  Michaela returns for follow up bladder and breast cancer.  No rashes; some itching since starting Opdivo; improved w/ Claritin.  Exercising regularly; three days a week she lifts weights x 90 mins; every day walks 3 miles.  Continues to ride  horses.  Headaches ongoing; come out of the blue; frontal bilaterally; eyes hurt from time to time; prone to headaches lifelong; concussions and herniated disk; these headaches different.  Some trace fluid retention in ankles and abdomen worse at the end of the days, better in the AM.  Some mild muscle aches following exercise and walking.  No diarrhea; tends towards constipation.  No jaundice, nausea, vomiting; does use some compazine.  Appetite, weight and energy at baseline.    --------------------    Physical Exam:  Video visit.    Labs / Imaging / Path:  We reviewed labs.    Video Visit:  Michaela is a 71 year old female who is being evaluated via a billable video visit.  }    Video start time: 9:04 AM  Video end time: 9:22 AM    Provider location: Off-site.  Patient location: Home    Mode of transmission: [employer].

## 2022-02-10 NOTE — LETTER
2/10/2022         RE: Michaela Dorman  [address]  [facility] [address]        Dear Colleague,    Thank you for referring your patient, Michaela Dorman, to the [facility] [place]. Please see a copy of my visit note below.    [facility] Hematology / Oncology  Progress Note  Name: Michaela Dorman  :  1950    MRN:  [id number]    --------------------    Assessment / Plan:  Left-sided ER-, HER2+ breast CA:  # Sep 2016 Presented w/ left axillary mass; staging multicentric T4 lesion w/ geraldine involvement; biopsy w/ ER-, HER2+ breast cancer  # Sep 2016 - 2017 Neoadjuvant AC x 4 cycles/TH x 4 cycles.  # 2017 Bilateral mastectomy no with geraldine evaluation; complete path CR from invasive cancer; 7 mm DCIS residual.  # 2017 - May 2017 Adjuvant radiation to left chestwall / axilla.  # 2017 - Dec 2017 Adjuvant Herceptin.    Bladder cancer:([id number])  # Oct 2020 Presented w/ dysuria.  # 2021 Cytoscopy w/ muscle-invasive high grade urothelial cancer.  # Mar 2021 TURBT.  # 2021 Neoadjuvant cisplatin/gem split dose complicated by TAMIR.  # May 2021 Neoadjuvant carboplatin/gem.  # 2021 Radical cystectomy w/ ileal conduit; path high grade urothelial carcinoma muscle invasive; margins negs; nodes negative.  # 2021 Adjuvant opdivo; planning 12 months.    Clinically well.  Personally reviewed surveillance CT CAP; DAGOBERTO; reviewed incidental findings; ovarian cyst improved.  Proceed w/ cycle 4 Opdivo; planning surveillance scans every 3 months while on adjuvant Opdivo.  Planning 12 months adjuvant Opdivo through 2022.  Obtain brain MRI due to new / different headaches and evaluate for CNS lesion.  Reviewed supportive cares for dermatitis / rash; topical lotions and antihistamines.  Fluid retention suspect relates to post-op lymphedema; continue prn lasix and compression; resolution overnight reassuring.    Girish Sauer,  MD    --------------------    Interval History:  Michaela returns for follow up bladder and breast cancer.  No rashes; some itching since starting Opdivo; improved w/ Claritin.  Exercising regularly; three days a week she lifts weights x 90 mins; every day walks 3 miles.  Continues to ride horses.  Headaches ongoing; come out of the blue; frontal bilaterally; eyes hurt from time to time; prone to headaches lifelong; concussions and herniated disk; these headaches different.  Some trace fluid retention in ankles and abdomen worse at the end of the days, better in the AM.  Some mild muscle aches following exercise and walking.  No diarrhea; tends towards constipation.  No jaundice, nausea, vomiting; does use some compazine.  Appetite, weight and energy at baseline.    --------------------    Physical Exam:  Video visit.    Labs / Imaging / Path:  We reviewed labs.    Video Visit:  Michaela is a 71 year old female who is being evaluated via a billable video visit.  }    Video start time: 9:04 AM  Video end time: 9:22 AM    Provider location: Off-site.  Patient location: Home    Mode of transmission: [employer] / [email or web address].        Again, thank you for allowing me to participate in the care of your patient.        Sincerely,        Girish Sauer MD

## 2022-02-15 ENCOUNTER — VIRTUAL VISIT (OUTPATIENT)
Dept: UROLOGY | Facility: CLINIC | Age: 72
End: 2022-02-15
Payer: COMMERCIAL

## 2022-02-15 DIAGNOSIS — C67.8 MALIGNANT NEOPLASM OF OVERLAPPING SITES OF BLADDER (H): Primary | ICD-10-CM

## 2022-02-15 PROCEDURE — 99213 OFFICE O/P EST LOW 20 MIN: CPT | Mod: GT | Performed by: UROLOGY

## 2022-02-15 NOTE — PROGRESS NOTES
Michaela Dorman is a 71 year old female who is being evaluated via a billable video visit.      How would you like to obtain your AVS? [employer]  If the video visit is dropped, the invitation should be resent by: Text to cell phone: [phone]  Will anyone else be joining your video visit? No    Video Start Time: 3:43 PM    CHIEF COMPLAINT   It was my pleasure to see Michaela Dorman who is a 71 year old female for follow-up of bladder cancer.      HPI  Michaela Dorman is a very pleasant 71 year old female who presents with a history of bladder cancer. She underwent cystectomy with ileal conduit 6/1/2021. Stage [id number] disease. Eating well.   Had positive margin at urethral margin noted on initial path, but has now had remnant urethra removed and proximal margin resected, with no residual disease. Recovering well from this.    Doing adjuvant Opdivo and tolerating this well.  Cr 1.02     Urethrectomy 10/27/2021  Final Diagnosis   A.  Distal urethra, excision:  - Benign urethral and periurethral glands with mild chronic inflammation and fibrosis  - Negative for malignancy    B.  Distal urethra, proximal margins, excision:  - Benign muscle and connective tissue with mild fibrosis  - Negative for malignancy      CT chest/abd/pelvis 2/9/2022  IMPRESSION:   1.  Cystic lesion left adnexa is stable since the prior studies dating  back to at least 9/21/2021 but was not definitely seen on the study  from 6/24/2021 and previously. This could represent an ovarian lesion  or other lesion.  2.  No other evidence for metastasis is seen. No other changes since  the most recent prior CT chest, abdomen and pelvis dated 2/4/2022.  3.  Evaluation right ureter is somewhat limited due to lack of  contrast in the ureter during a portion of the study. I cannot exclude  a lesion in the ureter although this is more likely due to the phase  of peristalsis.  4.  Postop changes status post cystectomy with ileal diversion and  right ventral  abdominal wall ostomy.     Cystectomy with ileal conduit 6/1/2021  FINAL DIAGNOSIS:   C. 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 in the periurethral ducts at   urethral margin   - Pathologic stage: [id number]   - Benign uterus, cervix, vaginal wall, ovaries and fallopian tubes with   physiological changes   - See synoptic report            Allergies:   Oxybutynin         Review of Systems:  From intake questionnaire     Skin: negative  Eyes: negative  Ears/Nose/Throat: negative  Respiratory: No shortness of breath, dyspnea on exertion, cough, or hemoptysis  Cardiovascular: No chest pain or palpitations  Gastrointestinal: negative; no nausea/vomiting, constipation or diarrhea  Genitourinary: as per HPI  Musculoskeletal: negative  Neurologic: negative  Psychiatric: negative  Hematologic/Lymphatic/Immunologic: negative  Endocrine: negative         Physical Exam:     Vitals:  No vitals were obtained today due to virtual visit.    Physical Exam   GENERAL: Healthy, alert and no distress  EYES: Eyes grossly normal to inspection.  No discharge or erythema, or obvious scleral/conjunctival abnormalities.  RESP: No audible wheeze, cough, or visible cyanosis.  No visible retractions or increased work of breathing.    SKIN: Visible skin clear. No significant rash, abnormal pigmentation or lesions.  NEURO: Cranial nerves grossly intact.  Mentation and speech appropriate for age.  PSYCH: Mentation appears normal, affect normal/bright, judgement and insight intact, normal speech and appearance well-groomed.      Outside and Past Medical records:    Review of the result(s) of each unique test - cr, CT         Assessment and Plan:     71 year old female with stage [id number] bladder cancer s/p radical cystectomy and ileal  conduit 6/1/2021. Had remnant urethra removed with no malignancy noted. On adjuvant Opdivo and following with medical oncology. No recurrence noted today. Creat stable and no hydronephrosis. Overall doing well at this time. Will continue surveillance.      Plan:  - Follow-up 3 months to review scans and labs (ordered by med onc)    Video-Visit Details    Type of service:  Video Visit    Video End Time:3:52 PM    Originating Location (pt. Location): Home    Distant Location (provider location):  [facility] UROLOGY AND [facility] FOR PROSTATE AND UROLOGIC CANCERS    Platform used for Video Visit: [id number]    15 minutes spent on the date of the encounter including direct interaction with the patient, performing chart review, history and exam, documentation and further activities as noted above.    Leonardo Robles MD  Urology  [facility]

## 2022-02-16 ENCOUNTER — INFUSION THERAPY VISIT (OUTPATIENT)
Dept: INFUSION THERAPY | Facility: CLINIC | Age: 72
End: 2022-02-16
Attending: PHYSICIAN ASSISTANT
Payer: MEDICARE

## 2022-02-16 ENCOUNTER — HOSPITAL ENCOUNTER (OUTPATIENT)
Dept: MRI IMAGING | Facility: CLINIC | Age: 72
End: 2022-02-16
Attending: INTERNAL MEDICINE
Payer: MEDICARE

## 2022-02-16 VITALS — WEIGHT: 144.3 LBS | BODY MASS INDEX: 23.29 KG/M2

## 2022-02-16 DIAGNOSIS — C67.8 MALIGNANT NEOPLASM OF OVERLAPPING SITES OF BLADDER (H): ICD-10-CM

## 2022-02-16 DIAGNOSIS — C67.8 MALIGNANT NEOPLASM OF OVERLAPPING SITES OF BLADDER (H): Primary | ICD-10-CM

## 2022-02-16 DIAGNOSIS — D64.9 NORMOCYTIC ANEMIA: ICD-10-CM

## 2022-02-16 DIAGNOSIS — R51.9 BILATERAL HEADACHES: ICD-10-CM

## 2022-02-16 DIAGNOSIS — C77.5 SECONDARY AND UNSPECIFIED MALIGNANT NEOPLASM OF INTRAPELVIC LYMPH NODES (H): ICD-10-CM

## 2022-02-16 LAB — FOLATE SERPL-MCNC: 35.5 NG/ML

## 2022-02-16 PROCEDURE — 83010 ASSAY OF HAPTOGLOBIN QUANT: CPT

## 2022-02-16 PROCEDURE — A9585 GADOBUTROL INJECTION: HCPCS | Performed by: INTERNAL MEDICINE

## 2022-02-16 PROCEDURE — 36591 DRAW BLOOD OFF VENOUS DEVICE: CPT

## 2022-02-16 PROCEDURE — 255N000002 HC RX 255 OP 636: Performed by: INTERNAL MEDICINE

## 2022-02-16 PROCEDURE — 70553 MRI BRAIN STEM W/O & W/DYE: CPT

## 2022-02-16 PROCEDURE — 82746 ASSAY OF FOLIC ACID SERUM: CPT

## 2022-02-16 PROCEDURE — 250N000011 HC RX IP 250 OP 636: Performed by: INTERNAL MEDICINE

## 2022-02-16 RX ORDER — HEPARIN SODIUM (PORCINE) LOCK FLUSH IV SOLN 100 UNIT/ML 100 UNIT/ML
5 SOLUTION INTRAVENOUS
Status: DISCONTINUED | OUTPATIENT
Start: 2022-02-16 | End: 2022-02-16 | Stop reason: HOSPADM

## 2022-02-16 RX ORDER — GADOBUTROL 604.72 MG/ML
7.5 INJECTION INTRAVENOUS ONCE
Status: COMPLETED | OUTPATIENT
Start: 2022-02-16 | End: 2022-02-16

## 2022-02-16 RX ORDER — HEPARIN SODIUM (PORCINE) LOCK FLUSH IV SOLN 100 UNIT/ML 100 UNIT/ML
5 SOLUTION INTRAVENOUS
Status: CANCELLED | OUTPATIENT
Start: 2022-02-16

## 2022-02-16 RX ADMIN — Medication 5 ML: at 15:14

## 2022-02-16 RX ADMIN — GADOBUTROL 6.5 ML: 604.72 INJECTION INTRAVENOUS at 14:47

## 2022-02-16 NOTE — PROGRESS NOTES
Infusion Nursing Note:  Michaela Dorman presents today for Port access/Port labs.    Patient seen by provider today: No   present during visit today: Not Applicable.    Note: N/A.      Intravenous Access:  Implanted Port.  Lab draw site R chest wall, Needle type Power, Gauge 19,3/4in.  Labs drawn without difficulty. Labs for Dr. Sauer.  After MRI, port had good blood return, pt was flushed w/ NS and deaccessed with Heparin per protocol.  Site WDL. No swelling, pain or erythema.    Discharge Plan:   AVS to patient via MYCHART.  Patient will return 3/10 for next appointment.   Patient discharged in stable condition accompanied by: self.  Departure Mode: Ambulatory.      Amy Renee RN

## 2022-02-17 PROBLEM — G89.4 CHRONIC PAIN SYNDROME: Status: ACTIVE | Noted: 2017-11-30

## 2022-02-17 LAB — HAPTOGLOB SERPL-MCNC: 208 MG/DL (ref 32–197)

## 2022-02-18 ENCOUNTER — TELEPHONE (OUTPATIENT)
Dept: ONCOLOGY | Facility: CLINIC | Age: 72
End: 2022-02-18
Payer: COMMERCIAL

## 2022-02-18 ENCOUNTER — MYC MEDICAL ADVICE (OUTPATIENT)
Dept: ONCOLOGY | Facility: CLINIC | Age: 72
End: 2022-02-18
Payer: COMMERCIAL

## 2022-02-18 NOTE — NURSING NOTE
2/18/22 called and spoke with pt. Assisted with scheduling return appts recommended by Dr. Smith:    Instructions:  1. Recommend minimizing sodium intake (ideally less than 2500 mg per day)  2. Recommend 6 month follow-up or sooner as needed.     Pt requested a Video Visit.  Lab appt scheduled on a date prior.    Encouraged to MyChart or call if any further questions or concerns.    Danika Scott LPN

## 2022-02-20 DIAGNOSIS — C67.9 UROTHELIAL CARCINOMA OF BLADDER WITH INVASION OF MUSCLE (H): ICD-10-CM

## 2022-02-20 DIAGNOSIS — C50.412 MALIGNANT NEOPLASM OF UPPER-OUTER QUADRANT OF LEFT FEMALE BREAST, UNSPECIFIED ESTROGEN RECEPTOR STATUS (H): ICD-10-CM

## 2022-02-20 DIAGNOSIS — Z51.11 ENCOUNTER FOR ANTINEOPLASTIC CHEMOTHERAPY: ICD-10-CM

## 2022-02-21 ENCOUNTER — MYC MEDICAL ADVICE (OUTPATIENT)
Dept: ONCOLOGY | Facility: CLINIC | Age: 72
End: 2022-02-21
Payer: COMMERCIAL

## 2022-02-21 ENCOUNTER — MYC MEDICAL ADVICE (OUTPATIENT)
Dept: ONCOLOGY | Facility: CLINIC | Age: 72
End: 2022-02-21

## 2022-02-21 ENCOUNTER — HOSPITAL ENCOUNTER (EMERGENCY)
Facility: CLINIC | Age: 72
Discharge: HOME | End: 2022-02-21
Attending: EMERGENCY MEDICINE | Admitting: EMERGENCY MEDICINE
Payer: MEDICARE

## 2022-02-21 VITALS
SYSTOLIC BLOOD PRESSURE: 129 MMHG | BODY MASS INDEX: 23.24 KG/M2 | RESPIRATION RATE: 18 BRPM | TEMPERATURE: 98 F | DIASTOLIC BLOOD PRESSURE: 70 MMHG | HEART RATE: 67 BPM | OXYGEN SATURATION: 97 % | WEIGHT: 144 LBS

## 2022-02-21 DIAGNOSIS — R51.9 HEADACHE DISORDER: ICD-10-CM

## 2022-02-21 LAB
ALBUMIN SERPL-MCNC: 3.5 G/DL (ref 3.4–5)
ALP SERPL-CCNC: 67 U/L (ref 40–150)
ALT SERPL W P-5'-P-CCNC: 38 U/L (ref 0–50)
ANION GAP SERPL CALCULATED.3IONS-SCNC: 4 MMOL/L (ref 3–14)
AST SERPL W P-5'-P-CCNC: 22 U/L (ref 0–45)
BASOPHILS # BLD AUTO: 0 10E3/UL (ref 0–0.2)
BASOPHILS NFR BLD AUTO: 0 %
BILIRUB SERPL-MCNC: 0.3 MG/DL (ref 0.2–1.3)
BUN SERPL-MCNC: 19 MG/DL (ref 7–30)
CALCIUM SERPL-MCNC: 9.6 MG/DL (ref 8.5–10.1)
CHLORIDE BLD-SCNC: 108 MMOL/L (ref 94–109)
CO2 SERPL-SCNC: 27 MMOL/L (ref 20–32)
CREAT SERPL-MCNC: 1 MG/DL (ref 0.52–1.04)
EOSINOPHIL # BLD AUTO: 0.1 10E3/UL (ref 0–0.7)
EOSINOPHIL NFR BLD AUTO: 1 %
ERYTHROCYTE [DISTWIDTH] IN BLOOD BY AUTOMATED COUNT: 13.1 % (ref 10–15)
GFR SERPL CREATININE-BSD FRML MDRD: 60 ML/MIN/1.73M2
GLUCOSE BLD-MCNC: 81 MG/DL (ref 70–99)
HCT VFR BLD AUTO: 31.7 % (ref 35–47)
HGB BLD-MCNC: 10.3 G/DL (ref 11.7–15.7)
IMM GRANULOCYTES # BLD: 0 10E3/UL
IMM GRANULOCYTES NFR BLD: 0 %
LYMPHOCYTES # BLD AUTO: 1.9 10E3/UL (ref 0.8–5.3)
LYMPHOCYTES NFR BLD AUTO: 18 %
MCH RBC QN AUTO: 32.5 PG (ref 26.5–33)
MCHC RBC AUTO-ENTMCNC: 32.5 G/DL (ref 31.5–36.5)
MCV RBC AUTO: 100 FL (ref 78–100)
MONOCYTES # BLD AUTO: 1 10E3/UL (ref 0–1.3)
MONOCYTES NFR BLD AUTO: 10 %
NEUTROPHILS # BLD AUTO: 7.2 10E3/UL (ref 1.6–8.3)
NEUTROPHILS NFR BLD AUTO: 71 %
NRBC # BLD AUTO: 0 10E3/UL
NRBC BLD AUTO-RTO: 0 /100
PLATELET # BLD AUTO: 229 10E3/UL (ref 150–450)
POTASSIUM BLD-SCNC: 4.2 MMOL/L (ref 3.4–5.3)
PROT SERPL-MCNC: 7 G/DL (ref 6.8–8.8)
RBC # BLD AUTO: 3.17 10E6/UL (ref 3.8–5.2)
SODIUM SERPL-SCNC: 139 MMOL/L (ref 133–144)
WBC # BLD AUTO: 10.2 10E3/UL (ref 4–11)

## 2022-02-21 PROCEDURE — 258N000003 HC RX IP 258 OP 636: Performed by: EMERGENCY MEDICINE

## 2022-02-21 PROCEDURE — 96374 THER/PROPH/DIAG INJ IV PUSH: CPT

## 2022-02-21 PROCEDURE — 96361 HYDRATE IV INFUSION ADD-ON: CPT

## 2022-02-21 PROCEDURE — 99285 EMERGENCY DEPT VISIT HI MDM: CPT | Mod: 25

## 2022-02-21 PROCEDURE — 82040 ASSAY OF SERUM ALBUMIN: CPT | Performed by: EMERGENCY MEDICINE

## 2022-02-21 PROCEDURE — 80053 COMPREHEN METABOLIC PANEL: CPT | Performed by: EMERGENCY MEDICINE

## 2022-02-21 PROCEDURE — 85025 COMPLETE CBC W/AUTO DIFF WBC: CPT | Performed by: EMERGENCY MEDICINE

## 2022-02-21 PROCEDURE — 36415 COLL VENOUS BLD VENIPUNCTURE: CPT | Performed by: EMERGENCY MEDICINE

## 2022-02-21 PROCEDURE — 96375 TX/PRO/DX INJ NEW DRUG ADDON: CPT

## 2022-02-21 PROCEDURE — 250N000011 HC RX IP 250 OP 636: Performed by: EMERGENCY MEDICINE

## 2022-02-21 PROCEDURE — 99285 EMERGENCY DEPT VISIT HI MDM: CPT | Performed by: EMERGENCY MEDICINE

## 2022-02-21 RX ORDER — HYDROMORPHONE HCL IN WATER/PF 6 MG/30 ML
0.2 PATIENT CONTROLLED ANALGESIA SYRINGE INTRAVENOUS
Status: COMPLETED | OUTPATIENT
Start: 2022-02-21 | End: 2022-02-21

## 2022-02-21 RX ORDER — PROCHLORPERAZINE MALEATE 10 MG
TABLET ORAL
Qty: 30 TABLET | Refills: 2 | Status: SHIPPED | OUTPATIENT
Start: 2022-02-21 | End: 2022-05-31

## 2022-02-21 RX ORDER — SODIUM CHLORIDE 9 MG/ML
INJECTION, SOLUTION INTRAVENOUS CONTINUOUS
Status: DISCONTINUED | OUTPATIENT
Start: 2022-02-21 | End: 2022-02-21 | Stop reason: HOSPADM

## 2022-02-21 RX ORDER — HEPARIN SODIUM (PORCINE) LOCK FLUSH IV SOLN 100 UNIT/ML 100 UNIT/ML
5-10 SOLUTION INTRAVENOUS
Status: DISCONTINUED | OUTPATIENT
Start: 2022-02-21 | End: 2022-02-21 | Stop reason: HOSPADM

## 2022-02-21 RX ORDER — KETOROLAC TROMETHAMINE 15 MG/ML
15 INJECTION, SOLUTION INTRAMUSCULAR; INTRAVENOUS ONCE
Status: COMPLETED | OUTPATIENT
Start: 2022-02-21 | End: 2022-02-21

## 2022-02-21 RX ADMIN — PROCHLORPERAZINE EDISYLATE 5 MG: 5 INJECTION INTRAMUSCULAR; INTRAVENOUS at 12:15

## 2022-02-21 RX ADMIN — HYDROMORPHONE HYDROCHLORIDE 0.2 MG: 0.2 INJECTION, SOLUTION INTRAMUSCULAR; INTRAVENOUS; SUBCUTANEOUS at 12:08

## 2022-02-21 RX ADMIN — KETOROLAC TROMETHAMINE 15 MG: 15 INJECTION, SOLUTION INTRAMUSCULAR; INTRAVENOUS at 12:08

## 2022-02-21 RX ADMIN — Medication 5 ML: at 14:07

## 2022-02-21 RX ADMIN — SODIUM CHLORIDE 500 ML: 9 INJECTION, SOLUTION INTRAVENOUS at 12:43

## 2022-02-21 NOTE — TELEPHONE ENCOUNTER
See Mychart message. Writer spoke with patient regarding Mychart message. Patient stated that she has a headache and it is 8/10 for pain. Patient stated that she checked her blood pressure and it was 70/48. Patient was alert and oriented on the phone. Writer instructed patient to go to the emergency room and to have someone else drive her or call 911. Patient understands and stated that she has someone that can drive her, patient will leave at once.       Carol Rollins RN on 2/21/2022 at 9:28 AM

## 2022-02-21 NOTE — ED PROVIDER NOTES
History     Chief Complaint   Patient presents with     Dizziness     Headache     HPI  Michaela Dorman is a 71 year old female who presents to the ER secondary to 3 days of orthostatic  hypotension, headaches, dizziness. History of subdural, bladder cancer, chemotherapy, bppv.  She is on immune therapy and headache has been there since last infusion 10 days ago. Located in the front of her head.  Has had some nausea, no light sensitivity. No history of migraines. No significant findings on recent brain mri.  No chest pain, palpitations, fever, cough, congestion, vertigo.  She is on a medication to keep her blood pressure up bc of problems with low blood pressure in the past.  Took oxycodone this morning that she has from her doctor so she can exercise.     Oncology visit on the 10th:  Obtain brain MRI due to new / different headaches and evaluate for CNS lesion.  Reviewed supportive cares for dermatitis / rash; topical lotions and antihistamines.  Fluid retention suspect relates to post-op lymphedema; continue prn lasix and compression; resolution overnight reassuring.     Girish Sauer MD    Urology visit on the 15th:  71 year old female with stage [id number] bladder cancer s/p radical cystectomy and ileal conduit 6/1/2021. Had remnant urethra removed with no malignancy noted. On adjuvant Opdivo and following with medical oncology. No recurrence noted today. Creat stable and no hydronephrosis. Overall doing well at this time. Will continue surveillance.     Mri brain on the 16th:  IMPRESSION:    1. No evidence of intracranial metastatic disease.  2. No acute intracranial pathology. Acute infarct, hemorrhage, or  herniation.  3. A few nonspecific white matter lesions most likely due to chronic  microvascular ischemic disease.  4. Developmental venous anomaly in the left frontal white matter with  surrounding T2 hyperintense signal possibly representing gliosis.     Nurse triage:  See cookdinner message. Writer  spoke with patient regarding L[employer] message. Patient stated that she has a headache and it is 8/10 for pain. Patient stated that she checked her blood pressure and it was 70/48. Patient was alert and oriented on the phone. Writer instructed patient to go to the emergency room and to have someone else drive her or call 911. Patient understands and stated that she has someone that can drive her, patient will leave at once.    Allergies:  Allergies   Allergen Reactions     Oxybutynin      Patient reports symptoms of nausea and mind fogginess       Problem List:    Patient Active Problem List    Diagnosis Date Noted     Hypomagnesemia 11/18/2021     Priority: Medium     Need for prophylactic vaccination and inoculation against influenza 11/18/2021     Priority: Medium     Constipation, unspecified constipation type 09/28/2021     Priority: Medium     Imaging abnormalities 09/28/2021     Priority: Medium     Anemia in neoplastic disease 05/21/2021     Priority: Medium     Chemotherapy-induced neutropenia (H) 05/19/2021     Priority: Medium     Thrombocytopenia (H) 05/19/2021     Priority: Medium     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) 04/07/2021     Priority: Medium     Check 11.21 for f/u Marty  Added automatically from request for surgery [id number]       Malignant neoplasm of overlapping sites of bladder (H) 03/08/2021     Priority: Medium     Personal history of malignant neoplasm of bladder 03/04/2021     Priority: Medium     Added automatically from request for surgery [id number]       Benign paroxysmal positional vertigo, bilateral 12/24/2020     Priority: Medium     Personal history of malignant neoplasm of breast 10/01/2020     Priority: Medium     Acute cystitis with hematuria 10/01/2020     Priority: Medium     Symptomatic stenosis of left carotid artery 09/22/2020     Priority: Medium     Added automatically from request for surgery [id number]       POSSIBLE Right internal carotid artery  aneurysm 09/08/2020     Priority: Medium     Carotid stenosis, bilateral L80%, R40% 09/02/2020     Priority: Medium     Carotid artery plaque, bilateral 09/02/2020     Priority: Medium     Anemia 07/16/2020     Priority: Medium     S/P lumbar fusion 02/04/2020     Priority: Medium     Bilateral impacted cerumen 01/13/2020     Priority: Medium     Stage 3a chronic kidney disease (H) 09/09/2019     Priority: Medium     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       Central stenosis of spinal canal 12/01/2017     Priority: Medium     lumbar         DDD (degenerative disc disease), lumbar 12/01/2017     Priority: Medium     Chronic pain syndrome 11/30/2017     Priority: Medium     substancePatient is followed by Phani Jason PA-C for ongoing prescription of pain medication.  All refills should only be approved by this provider, or covering partner.    Medication(s): Oxycodone IR 5mg.   Maximum quantity per month: 20  Clinic visit frequency required: Q 3 months     Controlled substance agreement:  Encounter-Level CSA:     There are no encounter-level csa.        Pain Clinic evaluation in the past: Yes       Date/Location:      DIRE Total Score(s):  No flowsheet data found.    Last [facility] website verification:  none   [email or web address]             Lumbar radiculopathy 11/30/2017     Priority: Medium     S/P reverse total shoulder arthroplasty, right 06/05/2017     Priority: Medium     Prophylactic antibiotic for dental procedure indicated due to prior joint replacement 06/05/2017     Priority: Medium     Hyperlipidemia LDL goal <130 05/30/2017     Priority: Medium     Anxiety 03/29/2017     Priority: Medium     S/P  bilateral mastectomy 02/08/2017     Priority: Medium     Encounter for antineoplastic chemotherapy 10/07/2016     Priority: Medium     Malignant neoplasm of upper-outer quadrant of left breast in female, estrogen receptor negative (H) 10/06/2016     Priority: Medium     Adjustment disorder with depressed mood 11/11/2015     Priority: Medium     Essential hypertension 11/11/2015     Priority: Medium     Baker's cyst of knee 02/09/2015     Priority: Medium     Patella-femoral syndrome 02/09/2015     Priority: Medium     Hypertension goal BP (blood pressure) < 140/90 10/04/2011     Priority: Medium     Health Care Home 09/29/2011     Priority: Medium     x  DX V65.8 REPLACED WITH 35183 HEALTH CARE HOME (04/08/2013)       Advanced directives, counseling/discussion 08/22/2011     Priority: Medium     Advance Directive Problem List Overview:   Name Relationship Phone    Primary Health Care Agent            Alternative Health Care Agent          Patient states has Advance Directive and will bring in a copy to clinic. 8/22/2011        Gautam Elizondo MD  02/21/22 1335         Trochanteric bursitis 01/06/2009     Priority: Medium     Family history of stroke (cerebrovascular) 03/07/2008     Priority: Medium     Enthesopathy of hip region 01/30/2006     Priority: Medium        Past Medical History:    Past Medical History:   Diagnosis Date     Acute kidney injury (H)      Carotid stenosis, bilateral L80%, R40% 09/02/2020     Central stenosis of spinal canal 12/01/2017     DDD (degenerative disc disease), lumbar 12/01/2017     Depressive disorder, not elsewhere classified      Esophageal reflux      ETOH abuse      Foraminal stenosis of lumbar region 12/01/2017     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      Subdural hematoma (H)      Urothelial carcinoma (H)        Past Surgical History:    Past Surgical History:   Procedure Laterality Date     ARTHROPLASTY SHOULDER Right 11/17/2015     ARTHROSCOPY KNEE  6/7/2012    Procedure:ARTHROSCOPY KNEE; right knee arthroscopy with partial lateral menisectomy; Surgeon:DAMIÁN MCALLISTER; Location:[facility]     BIOPSY VAGINAL N/A 10/27/2021    Procedure: DISTAL URETHRECTOMY;  Surgeon: Leonardo Robles MD;  Location: [facility] OR     COLONOSCOPY N/A 12/22/2017    Procedure: COLONOSCOPY;  colonoscopy;  Surgeon: Chuck Chand MD;  Location: [facility]     CYSTECTOMY BLADDER RADICAL, ILEAL DIVERSION, COMBINED N/A 6/1/2021    Procedure: RADICAL CYSTECTOMY  WITH ILEAL CONDUIT CREATION,BILATERAL PELVIC LYMPHADENECTOMY;  Surgeon: Leonardo Robles MD;  Location: [facility] OR     CYSTOSCOPY, RETROGRADES, COMBINED Bilateral 3/18/2021    Procedure: CYSTOSCOPY, WITH RETROGRADE PYELOGRAM;  Surgeon: Girish Jack MD;  Location: [facility]     CYSTOSCOPY, TRANSURETHRAL RESECTION (TUR) TUMOR BLADDER, COMBINED N/A 3/18/2021    Procedure: CYSTOSCOPY, WITH TRANSURETHRAL RESECTION BLADDER TUMOR;  Surgeon: Girish Jack MD;  Location: [facility]     ENDARTERECTOMY CAROTID Left 10/8/2020    Procedure: LEFT CAROTID ENDARTERECTOMY WITH EEG;  Surgeon: Lacho Jasmine MD;  Location: [facility] OR     EXCISE MASS AXILLA Left 9/16/2016    Procedure: EXCISE MASS AXILLA;  Surgeon: Keyon Blanco MD;  Location: [facility]     HC REMV CATARACT EXTRACAP,INSERT LENS, W/O ECP  6/25/2009    Right eye     INJECT EPIDURAL LUMBAR N/A 4/11/2019    Procedure: interlaminar epidual steroid injection lumbar 4-5;  Surgeon: Oskar Salvador MD;  [facility]     INJECT EPIDURAL LUMBAR Bilateral 9/12/2019    Procedure: lumbar 4-5 epidural interlaminar steroid injection;  Surgeon: Oskar Salvador MD;  [facility]     INSERT PORT VASCULAR ACCESS Right 10/7/2016    Procedure: INSERT PORT VASCULAR ACCESS;  Surgeon: Bella  MD Keyon;  Location: [facility]     IR CHEST PORT PLACEMENT > 5 YRS OF AGE  2021     MASTECTOMY SIMPLE BILATERAL Bilateral 2017    Procedure: MASTECTOMY SIMPLE BILATERAL;  Surgeon: Keyon Blanco MD;  [facility]     OPEN REDUCTION INTERNAL FIXATION FOOT Right 3/20/2015    Procedure: OPEN REDUCTION INTERNAL FIXATION FOOT;  Surgeon: Javi Herrmann DPM;  [facility]     OPTICAL TRACKING SYSTEM FUSION SPINE POSTERIOR LUMBAR TWO LEVELS N/A 2020    Procedure: LUMBAR 2-SACRAL1 TRANSFORMINAL INTERBODY FUSION;  Surgeon: Lenny Gomes MD;  Location: [facility]     REMOVE PORT VASCULAR ACCESS Right 2018    Procedure: REMOVE PORT VASCULAR ACCESS;  right intra jugular port removal;  Surgeon: Keyon Blanco MD;  [facility]     SHOULDER SURGERY Right 2015     ZZC LIGATE FALLOPIAN TUBE       ZZC NONSPECIFIC PROCEDURE      ankle fracture surgery       Family History:    Family History   Problem Relation Age of Onset     Hypertension Mother      Thyroid Disease Mother      Cerebrovascular Disease Mother      Hypertension Father      Cerebrovascular Disease Father      Cancer Father         skin     Thyroid Disease Sister      Diabetes Brother         type 2     Depression Sister      Breast Cancer Sister         age 47     Cancer Sister         skin     Cancer Brother         inside nose       Social History:  Marital Status:   [2]  Social History     Tobacco Use     Smoking status: Former Smoker     Packs/day: 3.00     Years: 10.00     Pack years: 30.00     Types: Cigarettes     Quit date: 1979     Years since quittin.2     Smokeless tobacco: Never Used     Tobacco comment: approx. 3 packs daily   Substance Use Topics     Alcohol use: Yes     Alcohol/week: 0.0 standard drinks     Comment: daily glass of wine     Drug use: No        Medications:    acetaminophen (TYLENOL) 500 MG tablet  ALPRAZolam (XANAX) 0.5 MG tablet  aspirin (ASA) 81 MG chewable tablet  atorvastatin  (LIPITOR) 20 MG tablet  carvedilol (COREG) 6.25 MG tablet  Cyanocobalamin (B-12 PO)  escitalopram (LEXAPRO) 10 MG tablet  escitalopram (LEXAPRO) 20 MG tablet  levothyroxine (SYNTHROID/LEVOTHROID) 25 MCG tablet  LORazepam (ATIVAN) 0.5 MG tablet  meclizine 25 MG CHEW  METAMUCIL FIBER PO  methocarbamol (ROBAXIN) 750 MG tablet  naltrexone (DEPADE/REVIA) 50 MG tablet  nivolumab (OPDIVO) 100 MG/10ML  omeprazole (PRILOSEC) 20 MG DR capsule  oxyCODONE-acetaminophen (PERCOCET) 5-325 MG tablet  Probiotic Product (PROBIOTIC PO)  prochlorperazine (COMPAZINE) 10 MG tablet  STOOL SOFTENER/LAXATIVE 50-8.6 MG tablet  SENNA-docusate sodium (SENNA S) 8.6-50 MG tablet          Review of Systems   All other systems reviewed and are negative.      Physical Exam   BP: 134/76  Pulse: 81  Temp: 98  F (36.7  C)  Resp: 18  Weight: 65.3 kg (144 lb)  SpO2: 96 %      Physical Exam  Vitals and nursing note reviewed.   Constitutional:       General: She is not in acute distress.     Appearance: She is well-developed. She is not diaphoretic.   HENT:      Head: Normocephalic and atraumatic.      Right Ear: Tympanic membrane and ear canal normal.      Ears:      Comments: Canal with significant wax, difficult to view tm     Nose: Nose normal. No congestion or rhinorrhea.      Mouth/Throat:      Mouth: Mucous membranes are dry.      Pharynx: No oropharyngeal exudate or posterior oropharyngeal erythema.   Eyes:      General: No scleral icterus.     Extraocular Movements: Extraocular movements intact.      Conjunctiva/sclera: Conjunctivae normal.      Pupils: Pupils are equal, round, and reactive to light.   Cardiovascular:      Rate and Rhythm: Normal rate and regular rhythm.   Pulmonary:      Effort: Pulmonary effort is normal.      Breath sounds: Normal breath sounds.   Abdominal:      Tenderness: There is no abdominal tenderness. There is no guarding or rebound.   Musculoskeletal:      Cervical back: Normal range of motion and neck supple.    Skin:     General: Skin is warm and dry.      Coloration: Skin is not pale.      Findings: No erythema or rash.   Neurological:      General: No focal deficit present.      Mental Status: She is alert and oriented to person, place, and time.      Cranial Nerves: No cranial nerve deficit.      Sensory: No sensory deficit.      Motor: No weakness.      Coordination: Coordination normal.   Psychiatric:         Mood and Affect: Mood normal.         Thought Content: Thought content normal.         ED Course                 Procedures                  Results for orders placed or performed during the hospital encounter of 02/21/22 (from the past 24 hour(s))   CBC with platelets differential    Narrative    The following orders were created for panel order CBC with platelets differential.  Procedure                               Abnormality         Status                     ---------                               -----------         ------                     CBC with platelets and d...[[id number]]  Abnormal            Final result                 Please view results for these tests on the individual orders.   CBC with platelets and differential   Result Value Ref Range    WBC Count 10.2 4.0 - 11.0 10e3/uL    RBC Count 3.17 (L) 3.80 - 5.20 10e6/uL    Hemoglobin 10.3 (L) 11.7 - 15.7 g/dL    Hematocrit 31.7 (L) 35.0 - 47.0 %     78 - 100 fL    MCH 32.5 26.5 - 33.0 pg    MCHC 32.5 31.5 - 36.5 g/dL    RDW 13.1 10.0 - 15.0 %    Platelet Count 229 150 - 450 10e3/uL    % Neutrophils 71 %    % Lymphocytes 18 %    % Monocytes 10 %    % Eosinophils 1 %    % Basophils 0 %    % Immature Granulocytes 0 %    NRBCs per 100 WBC 0 <1 /100    Absolute Neutrophils 7.2 1.6 - 8.3 10e3/uL    Absolute Lymphocytes 1.9 0.8 - 5.3 10e3/uL    Absolute Monocytes 1.0 0.0 - 1.3 10e3/uL    Absolute Eosinophils 0.1 0.0 - 0.7 10e3/uL    Absolute Basophils 0.0 0.0 - 0.2 10e3/uL    Absolute Immature Granulocytes 0.0 <=0.4 10e3/uL    Absolute NRBCs  0.0 10e3/uL   Comprehensive metabolic panel   Result Value Ref Range    Sodium 139 133 - 144 mmol/L    Potassium 4.2 3.4 - 5.3 mmol/L    Chloride 108 94 - 109 mmol/L    Carbon Dioxide (CO2) 27 20 - 32 mmol/L    Anion Gap 4 3 - 14 mmol/L    Urea Nitrogen 19 7 - 30 mg/dL    Creatinine 1.00 0.52 - 1.04 mg/dL    Calcium 9.6 8.5 - 10.1 mg/dL    Glucose 81 70 - 99 mg/dL    Alkaline Phosphatase 67 40 - 150 U/L    AST 22 0 - 45 U/L    ALT 38 0 - 50 U/L    Protein Total 7.0 6.8 - 8.8 g/dL    Albumin 3.5 3.4 - 5.0 g/dL    Bilirubin Total 0.3 0.2 - 1.3 mg/dL    GFR Estimate 60 (L) >60 mL/min/1.73m2     *Note: Due to a large number of results and/or encounters for the requested time period, some results have not been displayed. A complete set of results can be found in Results Review.       Medications   0.9% sodium chloride BOLUS (500 mLs Intravenous New Bag 2/21/22 1243)     Followed by   sodium chloride 0.9% infusion (has no administration in time range)   ketorolac (TORADOL) injection 15 mg (15 mg Intravenous Given 2/21/22 1208)   HYDROmorphone (DILAUDID) injection 0.2 mg (0.2 mg Intravenous Given 2/21/22 1208)   prochlorperazine (COMPAZINE) injection 5 mg (5 mg Intravenous Given 2/21/22 1215)       Assessments & Plan (with Medical Decision Making)  10 days of headache since last infusion.  Negative brain mri.  No history of migraines. Tried oxycodone at home.   May be a degree of dehydration.  bp looks good now. Ivf, dilaudid, compazine, labs ordered.   Patient re-evaluated and feeling much better. Has not received the fluids yet.  130 pm: patient re-evaluated, feeling better, headache almost gone.  Fluids almost done. Patient stable for discharge home with oncology follow up.  All questions answered. Return to er precautions discussed.      I have reviewed the nursing notes.    I have reviewed the findings, diagnosis, plan and need for follow up with the patient.      New Prescriptions    No medications on file        Final diagnoses:   Headache disorder       2/21/2022   [facility] EMERGENCY DEPT

## 2022-02-21 NOTE — TELEPHONE ENCOUNTER
"Future Office Visit:   Next 5 appointments (look out 90 days)    May 05, 2022 10:00 AM  Return Visit with Girish Sauer MD  [facility] ([facility] ) [address] [place] [id number] [phone]           Requested Prescriptions   Pending Prescriptions Disp Refills     prochlorperazine (COMPAZINE) 10 MG tablet [Pharmacy Med Name: PROCHLORPERAZINE 10MG TABLET] 30 tablet 2     Sig: TAKE 1/2 TABLET (5 MG) BY MOUTH EVERY 6 HOURS AS NEEDED (NAUSEA/VOMITING)        Antivertigo/Antiemetic Agents Passed - 2/20/2022  4:18 PM        Passed - Recent (12 mo) or future (30 days) visit within the authorizing provider's specialty     Patient has had an office visit with the authorizing provider or a provider within the authorizing providers department within the previous 12 mos or has a future within next 30 days. See \"Patient Info\" tab in inbasket, or \"Choose Columns\" in Meds & Orders section of the refill encounter.              Passed - Medication is active on med list        Passed - Patient is 18 years of age or older             Rx filled per RN protocol    Carol Rollins RN on 2/21/2022 at 3:37 PM    "

## 2022-02-21 NOTE — ED TRIAGE NOTES
Pt presents with concern for 3 days of hypotension and headaches and dizziness. Pt has been getting worked up for side effects of her medication. Pt had an MRI last week.

## 2022-02-21 NOTE — DISCHARGE INSTRUCTIONS
Please return to the ER if new or worsening symptoms for re-evaluation. I am glad you are feeling better.

## 2022-02-22 ENCOUNTER — PATIENT OUTREACH (OUTPATIENT)
Dept: CARE COORDINATION | Facility: CLINIC | Age: 72
End: 2022-02-22
Payer: COMMERCIAL

## 2022-02-22 ENCOUNTER — TELEPHONE (OUTPATIENT)
Dept: NEPHROLOGY | Facility: CLINIC | Age: 72
End: 2022-02-22
Payer: COMMERCIAL

## 2022-02-22 DIAGNOSIS — Z71.89 OTHER SPECIFIED COUNSELING: ICD-10-CM

## 2022-02-22 NOTE — TELEPHONE ENCOUNTER
Epic notification.  Pt has not read Result Note from Dr. Smith 2/17/22:          Your iron level came back slightly low. You may benefit from taking an iron supplement. You can purchased this over the counter - either ferrous sulfate 325 mg daily or ferrous gluconate 324 mg daily. These can cause constipation so decrease to every other day if this becomes a difficulty. Please call or MyChart with questions or concerns.     Sincerely,  Akiko Smith DO   Written by Akiko Smith DO on 2/17/2022 11:37 AM CST      Called and spoke with pt.  Read back the Result Note.  Pt verbalized understanding and agreed with plan.  A letter will also be mailed to pt with the Results and recommendations.   Confirmed address in chart is correct.    Encouraged pt to call or MyChart if any further questions or concerns.    Danika Scott LPN

## 2022-02-22 NOTE — PROGRESS NOTES
Clinic Care Coordination Contact  [facility]: Post-Discharge Note  SITUATION                                                      Admission:    Admission Date: 02/21/22   Reason for Admission: Dizziness, Headache  Discharge:   Discharge Date: 02/21/22  Discharge Diagnosis: Headache disorder    BACKGROUND                                                      Per hospital discharge summary and inpatient provider notes:  Michaela Dorman is a 71 year old female who presents to the ER secondary to 3 days of orthostatic  hypotension, headaches, dizziness. History of subdural, bladder cancer, chemotherapy, bppv.  She is on immune therapy and headache has been there since last infusion 10 days ago. Located in the front of her head.  Has had some nausea, no light sensitivity. No history of migraines. No significant findings on recent brain mri.  No chest pain, palpitations, fever, cough, congestion, vertigo.  She is on a medication to keep her blood pressure up bc of problems with low blood pressure in the past.  Took oxycodone this morning that she has from her doctor so she can exercise.      Oncology visit on the 10th:  Obtain brain MRI due to new / different headaches and evaluate for CNS lesion.  Reviewed supportive cares for dermatitis / rash; topical lotions and antihistamines.  Fluid retention suspect relates to post-op lymphedema; continue prn lasix and compression; resolution overnight reassuring.     Girish Sauer MD     Urology visit on the 15th:  71 year old female with stage ypT2N0 bladder cancer s/p radical cystectomy and ileal conduit 6/1/2021. Had remnant urethra removed with no malignancy noted. On adjuvant Opdivo and following with medical oncology. No recurrence noted today. Creat stable and no hydronephrosis. Overall doing well at this time. Will continue surveillance.      Mri brain on the 16th:  IMPRESSION:    1. No evidence of intracranial metastatic disease.  2. No acute intracranial  pathology. Acute infarct, hemorrhage, or  herniation.  3. A few nonspecific white matter lesions most likely due to chronic  microvascular ischemic disease.  4. Developmental venous anomaly in the left frontal white matter with  surrounding T2 hyperintense signal possibly representing gliosis.     ASSESSMENT      Enrollment  Primary Care Care Coordination Status: Not a Candidate    Discharge Assessment  How are you doing now that you are home?: better  How are your symptoms? (Red Flag symptoms escalate to triage hotline per guidelines): Improved  Do you feel your condition is stable enough to be safe at home until your provider visit?: Yes  Does the patient have their discharge instructions? : Yes  Does the patient have questions regarding their discharge instructions? : No  Were you started on any new medications or were there changes to any of your previous medications? : No  Does the patient have all of their medications?: Yes  Do you have questions regarding any of your medications? : No  Do you have all of your needed medical supplies or equipment (DME)?  (i.e. oxygen tank, CPAP, cane, etc.): Yes  Discharge follow-up appointment scheduled within 14 calendar days? : No        PLAN                                                       Outpatient Plan:  Follow up with Phani Tapia PA-C (Family Medicine) in 3 days (2/24/2022); If symptoms worsen, ER follow up        Future Appointments   Date Time Provider Department Center   3/10/2022  9:00 AM Girish Sauer MD [facility]   3/10/2022 12:30 PM PH INFUSION NURSE [facility]   3/10/2022  1:00 PM PH INFUSION NURSE [facility]   4/6/2022 10:00 AM Girish Sauer MD [facility]   4/7/2022 12:30 PM PH INFUSION NURSE [facility]   4/7/2022  1:00 PM PH INFUSION NURSE [facility]   5/2/2022 11:30 AM PH INFUSION NURSE [facility]   5/2/2022 12:40 PM PHCT1 [facility]   5/5/2022  9:30 AM PH INFUSION NURSE  [facility]   5/5/2022 10:00 AM Girish Sauer MD [facility]   5/5/2022 10:30 AM PH INFUSION NURSE [facility]   5/10/2022  2:30 PM Leonardo Robles MD [facility]   [facility]/2022 11:00 [facility]   8/2/2022 11:00 AM Akiko Smith DO [facility]         For any urgent concerns, please contact our 24 hour nurse triage line: [phone] ([phone])         Elisabeth Prakash MA

## 2022-02-22 NOTE — LETTER
February 22, 2022      Michaela Dorman  [address] [facility] [address]          Dear Michaela,    Your iron level came back slightly low. You may benefit from taking an iron supplement. You can purchased this over the counter - either ferrous sulfate 325 mg daily or ferrous gluconate 324 mg daily. These can cause constipation so decrease to every other day if this becomes a difficulty.   Please call or MyChart with questions or concerns.        Sincerely,    Akiko Smith DO    Division of Renal Diseases and Hypertension  [facility]  KW/gw    Component      Latest Ref Rng & Units 2/7/2022   Iron      35 - 180 ug/dL 57   Iron Binding Cap      240 - 430 ug/dL 335   Iron Saturation Index      15 - 46 % 17   Ferritin      8 - 252 ng/mL 68

## 2022-02-24 ENCOUNTER — TELEPHONE (OUTPATIENT)
Dept: ONCOLOGY | Facility: CLINIC | Age: 72
End: 2022-02-24
Payer: COMMERCIAL

## 2022-02-24 DIAGNOSIS — F41.9 ANXIETY: ICD-10-CM

## 2022-02-24 DIAGNOSIS — F43.21 ADJUSTMENT DISORDER WITH DEPRESSED MOOD: ICD-10-CM

## 2022-02-24 RX ORDER — ESCITALOPRAM OXALATE 10 MG/1
TABLET ORAL
Qty: 90 TABLET | Refills: 1 | Status: SHIPPED | OUTPATIENT
Start: 2022-02-24 | End: 2022-05-09

## 2022-02-25 ENCOUNTER — VIRTUAL VISIT (OUTPATIENT)
Dept: FAMILY MEDICINE | Facility: OTHER | Age: 72
End: 2022-02-25
Payer: COMMERCIAL

## 2022-02-25 ENCOUNTER — MYC MEDICAL ADVICE (OUTPATIENT)
Dept: ONCOLOGY | Facility: CLINIC | Age: 72
End: 2022-02-25
Payer: COMMERCIAL

## 2022-02-25 DIAGNOSIS — E21.5 PARATHYROID ABNORMALITY (H): ICD-10-CM

## 2022-02-25 DIAGNOSIS — C67.9 UROTHELIAL CARCINOMA OF BLADDER WITH INVASION OF MUSCLE (H): ICD-10-CM

## 2022-02-25 DIAGNOSIS — C77.5 SECONDARY AND UNSPECIFIED MALIGNANT NEOPLASM OF INTRAPELVIC LYMPH NODES (H): ICD-10-CM

## 2022-02-25 DIAGNOSIS — D70.1 CHEMOTHERAPY-INDUCED NEUTROPENIA (H): ICD-10-CM

## 2022-02-25 DIAGNOSIS — C50.412 MALIGNANT NEOPLASM OF UPPER-OUTER QUADRANT OF LEFT BREAST IN FEMALE, ESTROGEN RECEPTOR NEGATIVE (H): ICD-10-CM

## 2022-02-25 DIAGNOSIS — T45.1X5A CHEMOTHERAPY-INDUCED NEUTROPENIA (H): ICD-10-CM

## 2022-02-25 DIAGNOSIS — Z17.1 MALIGNANT NEOPLASM OF UPPER-OUTER QUADRANT OF LEFT BREAST IN FEMALE, ESTROGEN RECEPTOR NEGATIVE (H): ICD-10-CM

## 2022-02-25 DIAGNOSIS — C67.8 MALIGNANT NEOPLASM OF OVERLAPPING SITES OF BLADDER (H): ICD-10-CM

## 2022-02-25 DIAGNOSIS — D69.6 THROMBOCYTOPENIA (H): ICD-10-CM

## 2022-02-25 DIAGNOSIS — F41.9 ANXIETY: ICD-10-CM

## 2022-02-25 DIAGNOSIS — M06.09 RHEUMATOID ARTHRITIS OF MULTIPLE SITES WITH NEGATIVE RHEUMATOID FACTOR (H): Primary | ICD-10-CM

## 2022-02-25 PROCEDURE — 96127 BRIEF EMOTIONAL/BEHAV ASSMT: CPT | Mod: 95 | Performed by: PHYSICIAN ASSISTANT

## 2022-02-25 PROCEDURE — 99214 OFFICE O/P EST MOD 30 MIN: CPT | Mod: 95 | Performed by: PHYSICIAN ASSISTANT

## 2022-02-25 ASSESSMENT — ANXIETY QUESTIONNAIRES
2. NOT BEING ABLE TO STOP OR CONTROL WORRYING: NEARLY EVERY DAY
6. BECOMING EASILY ANNOYED OR IRRITABLE: NEARLY EVERY DAY
5. BEING SO RESTLESS THAT IT IS HARD TO SIT STILL: NOT AT ALL
IF YOU CHECKED OFF ANY PROBLEMS ON THIS QUESTIONNAIRE, HOW DIFFICULT HAVE THESE PROBLEMS MADE IT FOR YOU TO DO YOUR WORK, TAKE CARE OF THINGS AT HOME, OR GET ALONG WITH OTHER PEOPLE: SOMEWHAT DIFFICULT
7. FEELING AFRAID AS IF SOMETHING AWFUL MIGHT HAPPEN: NEARLY EVERY DAY
1. FEELING NERVOUS, ANXIOUS, OR ON EDGE: MORE THAN HALF THE DAYS
GAD7 TOTAL SCORE: 14
3. WORRYING TOO MUCH ABOUT DIFFERENT THINGS: NEARLY EVERY DAY

## 2022-02-25 ASSESSMENT — PATIENT HEALTH QUESTIONNAIRE - PHQ9
SUM OF ALL RESPONSES TO PHQ QUESTIONS 1-9: 15
5. POOR APPETITE OR OVEREATING: NOT AT ALL

## 2022-02-25 NOTE — PROGRESS NOTES
Michaela is a 71 year old who is being evaluated via a billable telephone visit.      What phone number would you like to be contacted at?[phone]   How would you like to obtain your AVS? 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  As it ends up she has a refill of medications through her oncologist for medications that she is concerned about but has not contacted the pharmacy about these yet.  Do recommend that she follow-up with the pharmacy to get the refill for her lorazepam.  Would have her stop taking the alprazolam.  She does have concerned about daily headaches that she is getting with her Opdivo.  I advised that she talk to her oncologist about this since a do not see the logic of adding a medication to help with a headache that she is getting as a side effect of medication as prescribed unless there are no alternatives.  I do not prescribe Opdivo as a primary care provider.  She does need to follow-up with me in the very near future to discuss her pain medication contract etc.     No follow-ups on file.    SANDY [facility]ell [facility]   Michaela is a 71 year old who presents for the following health issues     HPI     Anxiety Follow-Up    How are you doing with your anxiety since your last visit? Not good ran of xanax    Are you having other symptoms that might be associated with anxiety? No    Have you had a significant life event? Health Concerns     Are  you feeling depressed? Yes:  somewhat    Do you have any concerns with your use of alcohol or other drugs? No    Social History     Tobacco Use     Smoking status: Former Smoker     Packs/day: 3.00     Years: 10.00     Pack years: 30.00     Types: Cigarettes     Quit date: 1979     Years since quittin.2     Smokeless tobacco: Never Used     Tobacco comment: approx. 3 packs daily   Substance Use Topics     Alcohol use: Yes     Alcohol/week: 0.0 standard drinks     Comment: daily glass of wine     Drug use: No     AUSTIN-7 SCORE 2019 2020 3/8/2021   Total Score 6 (mild anxiety) - 18 (severe anxiety)   Total Score 6 14 18     PHQ 2019 2020 3/8/2021   PHQ-9 Total Score 4 3 8   Q9: Thoughts of better off dead/self-harm past 2 weeks Not at all Not at all Not at all     Last PHQ-9 2022   1.  Little interest or pleasure in doing things 3   2.  Feeling down, depressed, or hopeless 3   3.  Trouble falling or staying asleep, or sleeping too much 3   4.  Feeling tired or having little energy 3   5.  Poor appetite or overeating 0   6.  Feeling bad about yourself 0   7.  Trouble concentrating 3   8.  Moving slowly or restless 0   Q9: Thoughts of better off dead/self-harm past 2 weeks 0   PHQ-9 Total Score 15   Difficulty at work, home, or with people Very difficult       H  Review of Systems   Constitutional, HEENT, cardiovascular, pulmonary, GI, , musculoskeletal, neuro, skin, endocrine and psych systems are negative, except as otherwise noted.      Objective           Vitals:  No vitals were obtained today due to virtual visit.    Physical Exam   healthy, alert and no distress  PSYCH: Alert and oriented times 3; coherent speech, normal   rate and volume, able to articulate logical thoughts, able   to abstract reason, no tangential thoughts, no hallucinations   or delusions  Her affect is normal  RESP: No cough, no audible wheezing, able to talk in full sentences  Remainder of exam unable to be  completed due to telephone visits    No results found. However, due to the size of the patient record, not all encounters were searched. Please check Results Review for a complete set of results.          Phone call duration: 12 minutes

## 2022-02-25 NOTE — Clinical Note
Patient feels that she is getting a daily headache from her Opdivo.  I would ask that you consider an alternative that might not give her headache rather than adding another medication to her list for headaches related as a side effect medication potentially.  Any further evaluation and information that you can provide would be wonderful.  Thank you  Electronically signed:    Phani Jason PA-C

## 2022-02-25 NOTE — TELEPHONE ENCOUNTER
See [employer] message. Patient scheduled for video visit with Dr. Sauer today.    Carol Rollins RN on 2/25/2022 at 8:42 AM

## 2022-02-26 ASSESSMENT — ANXIETY QUESTIONNAIRES: GAD7 TOTAL SCORE: 14

## 2022-02-26 NOTE — TELEPHONE ENCOUNTER
PA Initiation    Medication: LORazepam (ATIVAN) 0.5 MG tablet  Insurance Company: [employer] [place] - Phone [phone] Fax [phone]  Pharmacy Filling the Rx: [id number] - [place], [place] - [address] [place] [address]  Filling Pharmacy Phone: [phone]  Filling Pharmacy Fax: [phone]  Start Date: 2/26/2022

## 2022-02-28 ENCOUNTER — MYC MEDICAL ADVICE (OUTPATIENT)
Dept: ONCOLOGY | Facility: CLINIC | Age: 72
End: 2022-02-28
Payer: COMMERCIAL

## 2022-02-28 DIAGNOSIS — R51.9 HEADACHE: Primary | ICD-10-CM

## 2022-02-28 RX ORDER — PREDNISONE 20 MG/1
40 TABLET ORAL DAILY
Qty: 10 TABLET | Refills: 0 | Status: SHIPPED | OUTPATIENT
Start: 2022-02-28 | End: 2022-03-05

## 2022-02-28 NOTE — TELEPHONE ENCOUNTER
My thoughts...    Start prednisone 40 mg daily now x 5 days.  Please order prednisone.    If progressive, go to ED; may need LP, further evaluation and IV pain control.    Girish Sauer MD.

## 2022-02-28 NOTE — TELEPHONE ENCOUNTER
Prior Authorization Approval    Authorization Effective Date: 1/1/2022  Authorization Expiration Date: 2/26/2023  Medication: LORazepam (ATIVAN) 0.5 MG tablet  Approved Dose/Quantity:   Reference #: [id number]   Ins[facility] - Phone [phone] Fax [phone]  Which Pharmacy is filling the prescription (Not needed for infusion/clinic administered): [id number] - [place], [place] - [address] [place] [address]  Pharmacy Notified: Yes  Patient Notified: Yes

## 2022-02-28 NOTE — TELEPHONE ENCOUNTER
Patient notified of below, script sent to pharm. Patient has no further questions or concerns.    Carol Rollins RN on 2/28/2022 at 1:07 PM

## 2022-03-02 ENCOUNTER — TELEPHONE (OUTPATIENT)
Dept: FAMILY MEDICINE | Facility: OTHER | Age: 72
End: 2022-03-02
Payer: COMMERCIAL

## 2022-03-02 NOTE — TELEPHONE ENCOUNTER
Patient Quality Outreach    Patient is due for the following:   Physical  - anytime  Immunizations  -  Pneumococcal, Zoster and based on advice from oncology    NEXT STEPS:   Schedule a office visit for physical and discussion around CSA soon.    Type of outreach:    please call patient to help her arrange face to face for CSA discussion.      Questions for provider review:    None     Phani Jason PA-C  Chart routed to Care Team.

## 2022-03-03 NOTE — TELEPHONE ENCOUNTER
[employer] message sent, will check in 2 weeks to ensure message was read, if not I will mail letter.

## 2022-03-10 ENCOUNTER — VIRTUAL VISIT (OUTPATIENT)
Dept: ONCOLOGY | Facility: CLINIC | Age: 72
End: 2022-03-10
Payer: COMMERCIAL

## 2022-03-10 DIAGNOSIS — C67.8 MALIGNANT NEOPLASM OF OVERLAPPING SITES OF BLADDER (H): Primary | ICD-10-CM

## 2022-03-10 DIAGNOSIS — G03.9 MENINGITIS: ICD-10-CM

## 2022-03-10 PROCEDURE — 99213 OFFICE O/P EST LOW 20 MIN: CPT | Mod: 95 | Performed by: INTERNAL MEDICINE

## 2022-03-10 RX ORDER — PREDNISONE 20 MG/1
TABLET ORAL
Qty: 5 TABLET | Refills: 0 | Status: SHIPPED | OUTPATIENT
Start: 2022-03-10 | End: 2022-09-08

## 2022-03-10 ASSESSMENT — PAIN SCALES - GENERAL: PAINLEVEL: MILD PAIN (2)

## 2022-03-10 NOTE — PATIENT INSTRUCTIONS
1) Okay to cancel April visit.  2) Follow-up in May with provider visit, labs and scan as planned.    Girish Sauer MD.    Today:  Plan to follow up in May.    Please follow up with Dr. Sauer in May.  Please schedule labs 1-3 days prior to follow up appointment.    Lab Date/Time: 5/2/22 @11:45am    CT Scan Date/Time: 5/2/22 @12:10pm    Office visit follow up with Dr. Sauer Date/Time: 5/5/22 @10:00am       If you have any questions or concerns please feel free to call.    If you need to reschedule please call:  Clinic or Lab Appointment - [phone]  Infusion - [phone]  Imaging - [phone]    Veronica Hale [facility]  [phone]

## 2022-03-10 NOTE — LETTER
3/10/2022         RE: Michaela Dorman  [address]  [facility] [address]        Dear Colleague,    Thank you for referring your patient, Michaela Dorman, to the [facility] [place]. Please see a copy of my visit note below.    [facility] Hematology / Oncology  Progress Note  Name: Michaela Dorman  :  1950    MRN:  [id number]    --------------------    Assessment / Plan:  Left-sided ER-, HER2+ breast CA:  # Sep 2016 Presented w/ left axillary mass; staging multicentric T4 lesion w/ geraldine involvement; biopsy w/ ER-, HER2+ breast cancer  # Sep 2016 - 2017 Neoadjuvant AC x 4 cycles/TH x 4 cycles.  # 2017 Bilateral mastectomy no with geraldine evaluation; complete path CR from invasive cancer; 7 mm DCIS residual.  # 2017 - May 2017 Adjuvant radiation to left chestwall / axilla.  # 2017 - Dec 2017 Adjuvant Herceptin.    Bladder cancer:([id number])  # Oct 2020 Presented w/ dysuria.  # 2021 Cytoscopy w/ muscle-invasive high grade urothelial cancer.  # Mar 2021 TURBT.  # 2021 Neoadjuvant cisplatin/gem split dose complicated by TAMIR.  # May 2021 Neoadjuvant carboplatin/gem.  # 2021 Radical cystectomy w/ ileal conduit; path high grade urothelial carcinoma muscle invasive; margins negs; nodes negative.  # 2021 Adjuvant opdivo; planning 12 months.    Clinically well.  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  Again this immune therapy was provided as an adjuvant therapy to reduce the risk of recurrence.  She and I have discussed that we could resume it but both agreed that we will save it for a rainy day so to speak.  We will continue with surveillance scans and plan to see her back in May as planned.  Fortunately the other benefits from the prednisone is that her dermatitis from immunotherapy has also improved.  I will provide her  with a small amount of prednisone here and there in case she has some sporadic headaches that may pop up as her prednisone taper has completed.    Girish Sauer MD    --------------------    Interval History:  Michaela returns for follow up bladder and breast cancer.  Headaches much better after prednisone burst.  Headaches really good.  Headaches were better in 24 hours of first prednisone dose.  Itching gone w/ prednisone too!    --------------------    Physical Exam:  Video visit.    Labs / Imaging / Path:  We reviewed labs.    Video Visit:  Michaela is a 71 year old female who is being evaluated via a billable video visit.  }    Video start time: 9:04 AM  Video end time: 9:22 AM    Provider location: Off-site.  Patient location: Home    Mode of transmission: [employer] / [employer].    DISCHARGE PLAN:  Next appointments: See patient instruction section  VIDEO VISIT  0 minutes for nursing discharge (face to face time)     Michaela Dorman is here today for Follow up.  Patient was not seen by writing nurse at time of appointment.  Appointments scheduled.  See patient instructions and Oncologist's Progress note for further details. Questions and concerns addressed to patient's satisfaction. Patient verbalized and demonstrated understanding of plan.  Contact information provided and patient is encouraged to call with any that arise in the interim of care.    Carol Rollins RN  [facility]   [phone]  3/10/2022, 10:08 AM        Again, thank you for allowing me to participate in the care of your patient.        Sincerely,        Girish Sauer MD

## 2022-03-10 NOTE — LETTER
3/10/2022         RE: Michaela Dorman  [address]  [facility] [address]        Dear Colleague,    Thank you for referring your patient, Michaela Dorman, to the [facility] [place]. Please see a copy of my visit note below.    [facility] Hematology / Oncology  Progress Note  Name: Michaela Dorman  :  1950    MRN:  [id number]    --------------------    Assessment / Plan:  Left-sided ER-, HER2+ breast CA:  # Sep 2016 Presented w/ left axillary mass; staging multicentric T4 lesion w/ geraldine involvement; biopsy w/ ER-, HER2+ breast cancer  # Sep 2016 - 2017 Neoadjuvant AC x 4 cycles/TH x 4 cycles.  # 2017 Bilateral mastectomy no with geraldine evaluation; complete path CR from invasive cancer; 7 mm DCIS residual.  # 2017 - May 2017 Adjuvant radiation to left chestwall / axilla.  # 2017 - Dec 2017 Adjuvant Herceptin.    Bladder cancer:([id number])  # Oct 2020 Presented w/ dysuria.  # 2021 Cytoscopy w/ muscle-invasive high grade urothelial cancer.  # Mar 2021 TURBT.  # 2021 Neoadjuvant cisplatin/gem split dose complicated by TAMIR.  # May 2021 Neoadjuvant carboplatin/gem.  # 2021 Radical cystectomy w/ ileal conduit; path high grade urothelial carcinoma muscle invasive; margins negs; nodes negative.  # 2021 Adjuvant opdivo; planning 12 months.    Clinically well.  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  Again this immune therapy was provided as an adjuvant therapy to reduce the risk of recurrence.  She and I have discussed that we could resume it but both agreed that we will save it for a rainy day so to speak.  We will continue with surveillance scans and plan to see her back in May as planned.  Fortunately the other benefits from the prednisone is that her dermatitis from immunotherapy has also improved.  I will provide her  with a small amount of prednisone here and there in case she has some sporadic headaches that may pop up as her prednisone taper has completed.    Girish Sauer MD    --------------------    Interval History:  Michaela returns for follow up bladder and breast cancer.  Headaches much better after prednisone burst.  Headaches really good.  Headaches were better in 24 hours of first prednisone dose.  Itching gone w/ prednisone too!    --------------------    Physical Exam:  Video visit.    Labs / Imaging / Path:  We reviewed labs.    Video Visit:  Michaela is a 71 year old female who is being evaluated via a billable video visit.  }    Video start time: 9:04 AM  Video end time: 9:22 AM    Provider location: Off-site.  Patient location: Home    Mode of transmission: [employer] / [employer].    DISCHARGE PLAN:  Next appointments: See patient instruction section  VIDEO VISIT  0 minutes for nursing discharge (face to face time)     Michaela Dorman is here today for Follow up.  Patient was not seen by writing nurse at time of appointment.  Appointments scheduled.  See patient instructions and Oncologist's Progress note for further details. Questions and concerns addressed to patient's satisfaction. Patient verbalized and demonstrated understanding of plan.  Contact information provided and patient is encouraged to call with any that arise in the interim of care.    Carol Rollins RN  [facility]   [phone]  3/10/2022, 10:08 AM        Again, thank you for allowing me to participate in the care of your patient.        Sincerely,        Girish Sauer MD

## 2022-03-10 NOTE — PROGRESS NOTES
DISCHARGE PLAN:  Next appointments: See patient instruction section  VIDEO VISIT  0 minutes for nursing discharge (face to face time)     Michaela Dorman is here today for Follow up.  Patient was not seen by writing nurse at time of appointment.  Appointments scheduled.  See patient instructions and Oncologist's Progress note for further details. Questions and concerns addressed to patient's satisfaction. Patient verbalized and demonstrated understanding of plan.  Contact information provided and patient is encouraged to call with any that arise in the interim of care.    Carol Rollins RN  [facility] [place]   [phone]  3/10/2022, 10:08 AM

## 2022-03-10 NOTE — PROGRESS NOTES
[facility] Hematology / Oncology  Progress Note  Name: Michaela Dorman  :  1950    MRN:  [id number]    --------------------    Assessment / Plan:  Left-sided ER-, HER2+ breast CA:  # Sep 2016 Presented w/ left axillary mass; staging multicentric T4 lesion w/ geraldine involvement; biopsy w/ ER-, HER2+ breast cancer  # Sep 2016 - 2017 Neoadjuvant AC x 4 cycles/TH x 4 cycles.  # 2017 Bilateral mastectomy no with geraldine evaluation; complete path CR from invasive cancer; 7 mm DCIS residual.  # 2017 - May 2017 Adjuvant radiation to left chestwall / axilla.  # 2017 - Dec 2017 Adjuvant Herceptin.    Bladder cancer:([id number])  # Oct 2020 Presented w/ dysuria.  # 2021 Cytoscopy w/ muscle-invasive high grade urothelial cancer.  # Mar 2021 TURBT.  # 2021 Neoadjuvant cisplatin/gem split dose complicated by TAMIR.  # May 2021 Neoadjuvant carboplatin/gem.  # 2021 Radical cystectomy w/ ileal conduit; path high grade urothelial carcinoma muscle invasive; margins negs; nodes negative.  # 2021 Adjuvant opdivo; planning 12 months.    Clinically well.  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  Again this immune therapy was provided as an adjuvant therapy to reduce the risk of recurrence.  She and I have discussed that we could resume it but both agreed that we will save it for a rainy day so to speak.  We will continue with surveillance scans and plan to see her back in May as planned.  Fortunately the other benefits from the prednisone is that her dermatitis from immunotherapy has also improved.  I will provide her with a small amount of prednisone here and there in case she has some sporadic headaches that may pop up as her prednisone taper has completed.    Girish Sauer MD    --------------------    Interval History:  Michaela returns for follow up bladder and  breast cancer.  Headaches much better after prednisone burst.  Headaches really good.  Headaches were better in 24 hours of first prednisone dose.  Itching gone w/ prednisone too!    --------------------    Physical Exam:  Video visit.    Labs / Imaging / Path:  We reviewed labs.    Video Visit:  Michaela is a 71 year old female who is being evaluated via a billable video visit.  }    Video start time: 9:04 AM  Video end time: 9:22 AM    Provider location: Off-site.  Patient location: Home    Mode of transmission: [employer] / [employer].

## 2022-03-17 ENCOUNTER — TELEPHONE (OUTPATIENT)
Dept: FAMILY MEDICINE | Facility: OTHER | Age: 72
End: 2022-03-17
Payer: COMMERCIAL

## 2022-03-17 NOTE — TELEPHONE ENCOUNTER
Reason for Call:  Form, our goal is to have forms completed with 72 hours, however, some forms may require a visit or additional information.    Type of letter, form or note:  medical    Who is the form from?: [employer] (if other please explain)    Where did the form come from: form was faxed in    What clinic location was the form placed at?: [facility] [phone]    Where the form was placed: Team D Box/Folder    What number is listed as a contact on the form?: [phone]       Additional comments: Please complete form and return to [phone]    Call taken on 3/17/2022 at 2:45 PM by Virginie Caruso

## 2022-03-18 ENCOUNTER — VIRTUAL VISIT (OUTPATIENT)
Dept: FAMILY MEDICINE | Facility: OTHER | Age: 72
End: 2022-03-18
Payer: COMMERCIAL

## 2022-03-18 DIAGNOSIS — M54.16 LUMBAR RADICULOPATHY: ICD-10-CM

## 2022-03-18 DIAGNOSIS — G89.4 CHRONIC PAIN SYNDROME: ICD-10-CM

## 2022-03-18 DIAGNOSIS — I10 HYPERTENSION GOAL BP (BLOOD PRESSURE) < 140/90: ICD-10-CM

## 2022-03-18 DIAGNOSIS — M48.00 CENTRAL STENOSIS OF SPINAL CANAL: ICD-10-CM

## 2022-03-18 DIAGNOSIS — Z79.899 ENCOUNTER FOR LONG-TERM CURRENT USE OF MEDICATION: ICD-10-CM

## 2022-03-18 DIAGNOSIS — K59.00 CONSTIPATION, UNSPECIFIED CONSTIPATION TYPE: ICD-10-CM

## 2022-03-18 DIAGNOSIS — D70.1 CHEMOTHERAPY-INDUCED NEUTROPENIA (H): ICD-10-CM

## 2022-03-18 DIAGNOSIS — Z93.2 ILEOSTOMY STATUS (H): ICD-10-CM

## 2022-03-18 DIAGNOSIS — C67.8 MALIGNANT NEOPLASM OF OVERLAPPING SITES OF BLADDER (H): ICD-10-CM

## 2022-03-18 DIAGNOSIS — C77.5 SECONDARY AND UNSPECIFIED MALIGNANT NEOPLASM OF INTRAPELVIC LYMPH NODES (H): ICD-10-CM

## 2022-03-18 DIAGNOSIS — T45.1X5A CHEMOTHERAPY-INDUCED NEUTROPENIA (H): ICD-10-CM

## 2022-03-18 DIAGNOSIS — Z13.220 SCREENING FOR HYPERLIPIDEMIA: Primary | ICD-10-CM

## 2022-03-18 DIAGNOSIS — C67.9 UROTHELIAL CARCINOMA OF BLADDER WITH INVASION OF MUSCLE (H): ICD-10-CM

## 2022-03-18 PROCEDURE — 99213 OFFICE O/P EST LOW 20 MIN: CPT | Mod: 95 | Performed by: PHYSICIAN ASSISTANT

## 2022-03-18 RX ORDER — ALPRAZOLAM 0.5 MG
0.5 TABLET ORAL 3 TIMES DAILY PRN
COMMUNITY
Start: 2022-02-25 | End: 2024-01-03

## 2022-03-18 RX ORDER — OXYCODONE AND ACETAMINOPHEN 5; 325 MG/1; MG/1
.5-1 TABLET ORAL EVERY 6 HOURS PRN
Qty: 60 TABLET | Refills: 0 | Status: SHIPPED | OUTPATIENT
Start: 2022-03-18 | End: 2022-05-31

## 2022-03-18 NOTE — LETTER
Opioid / Opioid Plus Controlled Substance Agreement    This is an agreement between you and your provider about the safe and appropriate use of controlled substance/opioids prescribed by your care team. Controlled substances are medicines that can cause physical and mental dependence (abuse).    There are strict laws about having and using these medicines. We here at [facility] are committing to working with you in your efforts to get better. To support you in this work, we ll help you schedule regular office appointments for medicine refills. If we must cancel or change your appointment for any reason, we ll make sure you have enough medicine to last until your next appointment.     As a Provider, I will:    Listen carefully to your concerns and treat you with respect.     Recommend a treatment plan that I believe is in your best interest. This plan may involve therapies other than opioid pain medication.     Talk with you often about the possible benefits, and the risk of harm of any medicine that we prescribe for you.     Provide a plan on how to taper (discontinue or go off) using this medicine if the decision is made to stop its use.    As a Patient, I understand that opioid(s):     Are a controlled substance prescribed by my care team to help me function or work and manage my condition(s).     Are strong medicines and can cause serious side effects such as:    Drowsiness, which can seriously affect my driving ability    A lower breathing rate, enough to cause death    Harm to my thinking ability     Depression     Abuse of and addiction to this medicine    Need to be taken exactly as prescribed. Combining opioids with certain medicines or chemicals (such as illegal drugs, sedatives, sleeping pills, and benzodiazepines) can be dangerous or even fatal. If I stop opioids suddenly, I may have severe withdrawal symptoms.    Do not work for all types of pain nor for all patients. If they re not helpful, I may  be asked to stop them.        The risks, benefits and side effects of these medicine(s) were explained to me. I agree that:  1. I will take part in other treatments as advised by my care team. This may be psychiatry or counseling, physical therapy, behavioral therapy, group treatment or a referral to a specialist.     2. I will keep all my appointments. I understand that this is part of the monitoring of opioids. My care team may require an office visit for EVERY opioid/controlled substance refill. If I miss appointments or don t follow instructions, my care team may stop my medicine.    3. I will take my medicines as prescribed. I will not change the dose or schedule unless my care team tells me to. There will be no refills if I run out early.     4. I may be asked to come to the clinic and complete a urine drug test or complete a pill count at any time. If I don t give a urine sample or participate in a pill count, the care team may stop my medicine.    5. I will only receive prescriptions from this clinic for chronic pain. If I am treated by another provider for acute pain issues, I will tell them that I am taking opioid pain medication for chronic pain and that I have a treatment agreement with this provider. I will inform my [facility] care team within one business day if I am given a prescription for any pain medication by another healthcare provider. My [facility] care team can contact other providers and pharmacists about my use of any medicines.    6. It is up to me to make sure that I don t run out of my medicines on weekends or holidays. If my care team is willing to refill my opioid prescription without a visit, I must request refills only during office hours. Refills may take up to 3 business days to process. I will use one pharmacy to fill all my opioid and other controlled substance prescriptions. I will notify the clinic about any changes to my insurance or medication  availability.    7. I am responsible for my prescriptions. If the medicine/prescription is lost, stolen or destroyed, it will not be replaced. I also agree not to share controlled substance medicines with anyone.    8. I am aware I should not use any illegal or recreational drugs. I agree not to drink alcohol unless my care team says I can.       9. If I enroll in the [place] [facility] Cann[facility] program, I will tell my care team prior to my next refill.     10. I will tell my care team right away if I become pregnant, have a new medical problem treated outside of my regular clinic, or have a change in my medications.    11. I understand that this medicine can affect my thinking, judgment and reaction time. Alcohol and drugs affect the brain and body, which can affect the safety of my driving. Being under the influence of alcohol or drugs can affect my decision-making, behaviors, personal safety, and the safety of others. Driving while impaired (DWI) can occur if a person is driving, operating, or in physical control of a car, motorcycle, boat, snowmobile, ATV, motorbike, off-road vehicle, or any other motor vehicle ([place] Statute 169A.20). I understand the risk if I choose to drive or operate any vehicle or machinery.    I understand that if I do not follow any of the conditions above, my prescriptions or treatment may be stopped or changed.          Opioids  What You Need to Know    What are opioids?   Opioids are pain medicines that must be prescribed by a doctor. They are also known as narcotics.     Examples are:   1. morphine (MS Contin, Farideh)  2. oxycodone (Oxycontin)  3. oxycodone and acetaminophen (Percocet)  4. hydrocodone and acetaminophen (Vicodin, Norco)   5. fentanyl patch (Duragesic)   6. hydromorphone (Dilaudid)   7. methadone  8. codeine (Tylenol #3)     What do opioids do well?   Opioids are best for severe short-term pain such as after a surgery or injury. They may work well for cancer pain. They may  help some people with long-lasting (chronic) pain.     What do opioids NOT do well?   Opioids never get rid of pain entirely, and they don t work well for most patients with chronic pain. Opioids don t reduce swelling, one of the causes of pain.                                    Other ways to manage chronic pain and improve function include:       Treat the health problem that may be causing pain    Anti-inflammation medicines, which reduce swelling and tenderness, such as ibuprofen (Advil, Motrin) or naproxen (Aleve)    Acetaminophen (Tylenol)    Antidepressants and anti-seizure medicines, especially for nerve pain    Topical treatments such as patches or creams    Injections or nerve blocks    Chiropractic or osteopathic treatment    Acupuncture, massage, deep breathing, meditation, visual imagery, aromatherapy    Use heat or ice at the pain site    Physical therapy     Exercise    Stop smoking    Take part in therapy       Risks and side effects     Talk to your doctor before you start or decide to keep taking opioids. Possible side effects include:      Lowering your breathing rate enough to cause death    Overdose, including death, especially if taking higher than prescribed doses    Worse depression symptoms; less pleasure in things you usually enjoy    Feeling tired or sluggish    Slower thoughts or cloudy thinking    Being more sensitive to pain over time; pain is harder to control    Trouble sleeping or restless sleep    Changes in hormone levels (for example, less testosterone)    Changes in sex drive or ability to have sex    Constipation    Unsafe driving    Itching and sweating    Dizziness    Nausea, throwing up and dry mouth    What else should I know about opioids?    Opioids may lead to dependence, tolerance, or addiction.      Dependence means that if you stop or reduce the medicine too quickly, you will have withdrawal symptoms. These include loose poop (diarrhea), jitters, flu-like symptoms,  nervousness and tremors. Dependence is not the same as addiction.                       Tolerance means needing higher doses over time to get the same effect. This may increase the chance of serious side effects.      Addiction is when people improperly use a substance that harms their body, their mind or their relations with others. Use of opiates can cause a relapse of addiction if you have a history of drug or alcohol abuse.      People who have used opioids for a long time may have a lower quality of life, worse depression, higher levels of pain and more visits to doctors.    You can overdose on opioids. Take these steps to lower your risk of overdose:    1. Recognize the signs:  Signs of overdose include decrease or loss of consciousness (blackout), slowed breathing, trouble waking up and blue lips. If someone is worried about overdose, they should call 911.    2. Talk to your doctor about Narcan (naloxone).   If you are at risk for overdose, you may be given a prescription for Narcan. This medicine very quickly reverses the effects of opioids.   If you overdose, a friend or family member can give you Narcan while waiting for the ambulance. They need to know the signs of overdose and how to give Narcan.     3. Don't use alcohol or street drugs.   Taking them with opioids can cause death.    4. Do not take any of these medicines unless your doctor says it s OK. Taking these with opioids can cause death:    Benzodiazepines, such as lorazepam (Ativan), alprazolam (Xanax) or diazepam (Valium)    Muscle relaxers, such as cyclobenzaprine (Flexeril)    Sleeping pills like zolpidem (Ambien)     Other opioids      How to keep you and other people safe while taking opioids:    1. Never share your opioids with others.  Opioid medicines are regulated by the Drug Enforcement Agency (TAN[employer]). Selling or sharing medications is a criminal act.    2. Be sure to store opioids in a secure place, locked up if possible. Young children  can easily swallow them and overdose.    3. When you are traveling with your medicines, keep them in the original bottles. If you use a pill box, be sure you also carry a copy of your medicine list from your clinic or pharmacy.    4. Safe disposal of opioids    Most pharmacies have places to get rid of medicine, called disposal kiosks. Medicine disposal options are also available in every [facility]. Search your county and  medication disposal  to find more options. You can find more details at:  [email or web address]     I agree that my provider, clinic care team, and pharmacy may work with any city, state or federal law enforcement agency that investigates the misuse, sale, or other diversion of my controlled medicine. I will allow my provider to discuss my care with, or share a copy of, this agreement with any other treating provider, pharmacy or emergency room where I receive care.    I have read this agreement and have asked questions about anything I did not understand.    _______________________________________________________  Patient Signature - Michaela Dorman _____________________                   Date     _______________________________________________________  Provider Signature - Phani Jason PA-C   _____________________                   Date     _______________________________________________________  Witness Signature (required if provider not present while patient signing)   _____________________                   Date

## 2022-03-18 NOTE — PROGRESS NOTES
Lissa is a 71 year old who is being evaluated via a billable video visit.      How would you like to obtain your AVS? MyChart  If the video visit is dropped, the invitation should be resent by: Text to cell phone: [phone]  Will anyone else be joining your video visit? No  Video Start Time: 3:09 PM    Assessment & Plan     (Z13.220) Screening for hyperlipidemia  (primary encounter diagnosis)  (G89.4) Chronic pain syndrome  (M54.16) Lumbar radiculopathy  (K59.00) Constipation, unspecified constipation type  (I10) Hypertension goal BP (blood pressure) < 140/90  (M48.00) Central stenosis of spinal canal  Discussed controlled substance agreement with the patient and will mail her a copy.  She is to review it carefully and send it back to us.  Currently she is dealing with bladder cancer and recently her  fell and broke his leg and had surgery so it has been very difficult for her to get around to get anything done much less drive to 35 miles it takes to come and see me in [place].  We will continue to fill medications for now and follow-up in the near future.    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  Plan: [id number] - Urine Drugs of Abuse Panel 13 -         (Screening) - ([facility] Only)            Italicized print addended on 19 August 2022 to cover ileostomy supplies. Electronically signed:    Phani Jason PA-C       No follow-ups on file.    Phani Jason PA-C  [facility] [place]   Lissa is a 71 year old who presents for the following health issues     History of Present Illness       Reason for visit:  Medication    She  eats 0-1 servings of fruits and vegetables daily.She consumes 1 sweetened beverage(s) daily.She exercises with enough effort to increase her heart rate 30 to 60 minutes per day.  She exercises with enough effort to increase her heart rate 6 days per week.   She is taking medications regularly.         Review of Systems   Constitutional, HEENT, cardiovascular, pulmonary, GI, , musculoskeletal, neuro, skin, endocrine and psych systems are negative, except as otherwise noted.      Objective           Vitals:  No vitals were obtained today due to virtual visit.    Physical Exam   GENERAL: Healthy, alert and no distress  EYES: Eyes grossly normal to inspection.  No discharge or erythema, or obvious scleral/conjunctival abnormalities.  RESP: No audible wheeze, cough, or visible cyanosis.  No visible retractions or increased work of breathing.    SKIN: Visible skin clear. No significant rash, abnormal pigmentation or lesions.  NEURO: Cranial nerves grossly intact.  Mentation and speech appropriate for age.  PSYCH: Mentation appears normal, affect normal/bright, judgement and insight intact, normal speech and appearance well-groomed.    No results found. However, due to the size of the patient record, not all encounters were searched. Please check Results Review for a complete set of results.            Video-Visit Details    Type of service:  Video Visit    Video End Time:1520    Originating Location (pt. Location): Home    Distant Location (provider location):  [facility]     Platform used for Video Visit: Tasted Menu

## 2022-03-28 DIAGNOSIS — E03.4 HYPOTHYROIDISM DUE TO ACQUIRED ATROPHY OF THYROID: ICD-10-CM

## 2022-03-28 RX ORDER — LEVOTHYROXINE SODIUM 25 UG/1
25 TABLET ORAL DAILY
Qty: 90 TABLET | Refills: 0 | Status: SHIPPED | OUTPATIENT
Start: 2022-03-28 | End: 2022-07-01

## 2022-04-04 ENCOUNTER — MEDICAL CORRESPONDENCE (OUTPATIENT)
Dept: HEALTH INFORMATION MANAGEMENT | Facility: CLINIC | Age: 72
End: 2022-04-04
Payer: COMMERCIAL

## 2022-04-04 ENCOUNTER — TELEPHONE (OUTPATIENT)
Dept: FAMILY MEDICINE | Facility: OTHER | Age: 72
End: 2022-04-04
Payer: COMMERCIAL

## 2022-04-04 NOTE — TELEPHONE ENCOUNTER
The form is signed and in the MA task box (next to the printer) for completion and scan of the document into the medical record.  Electronically signed:    Phani Jason PA-C

## 2022-04-04 NOTE — TELEPHONE ENCOUNTER
Reason for Call:  Form, our goal is to have forms completed with 72 hours, however, some forms may require a visit or additional information.    Type of letter, form or note:  medical    Who is the form from?: [employer] (if other please explain)    Where did the form come from: form was faxed in    What clinic location was the form placed at?: [facility] [phone]    Where the form was placed: Team D form bin    What number is listed as a contact on the form?: fax [phone]       Additional comments: Please complete and fax    Call taken on 4/4/2022 at 1:29 PM by Nesha Romero

## 2022-04-13 ENCOUNTER — PRE VISIT (OUTPATIENT)
Dept: UROLOGY | Facility: CLINIC | Age: 72
End: 2022-04-13
Payer: COMMERCIAL

## 2022-04-13 NOTE — CONFIDENTIAL NOTE
Reason for visit: Follow up w/ labs and imaging     Relevant information: Bladder cancer s/p radical cystectomy w/ ileal conduit 6/2021    Records/imaging/labs/orders: Labs and imaging ordered by medical oncology (CT chest/abd/pelvis on 5/2)    Pt called: N/A    At Rooming: Video

## 2022-04-14 ENCOUNTER — HOSPITAL ENCOUNTER (OUTPATIENT)
Dept: WOUND CARE | Facility: CLINIC | Age: 72
Discharge: HOME | End: 2022-04-14
Attending: INTERNAL MEDICINE | Admitting: INTERNAL MEDICINE
Payer: MEDICARE

## 2022-04-14 PROCEDURE — G0463 HOSPITAL OUTPT CLINIC VISIT: HCPCS

## 2022-04-14 NOTE — PROGRESS NOTES
Reason For Visit: Michaela Dorman, 71 year old female, seen as outpatient to evaluate and treat ileostomy concerns. Referred by Dr Camacho STEVENS. Patient presents by herself today.      History: Pt with a past medial history including bladder cancer, underwent cystectomy with ileal conduit urostomy creation and bilateral pelvic lymphadenectomy on 6/1/21 at [facility].  She has done well with her appliance wear times and her skin has been well intact with no breakdown.  However she has chronic ache at the stoma site and difficulty with finding appropriate placement of her belt line.  She presents today 4/14/22 for her intiail visit to the Wound and Ostomy clinic.    Personal/social history: Pt lives independently with no formal in home medical assistance.    Objective:   Physical appearance: alert and oriented  Current treatment plan: for ostomy a   Last changed: two days ago    Wounds, NA pt is here for ostomy assessment with no current wounds.     Urostomy assessment see below.    Dorsalis Pedal Pulse: not assessed this visit  Posterior Tibial Pulse: not assessed this visit  Hair growth: not assessed this visit  Capillary Refill: not assessed this visit  Feet/toes color: not assessed this visit  Nails: not assessed this visit  R Leg: Edema not assessed this visit. Ankle circumference NA cm. Calf circumference NA cm.  L Leg: Edema not assessed this visit. Ankle circumference NA cm. Calf circumference NA cm.    Mobility: wnl  Current offloading/footwear: not assessed this visit  Sensation: not assessed this visit  HgbA1C: not assessed this visit Date: NA  Checks Blood Glucose?:  NA Average Readings: NA  Other callousing/areas of concern: none noted    Diet: regular  Smoking: not assessed this visit    Discussed: etiology of wound (NA Urostomy), pathophysiology and patient specific goals for wound healing.   Education: On role of woc nurse in the clinic setting.  Basics of ostomy appliance placement  and wear time.  See Assessment with more teaching.    Assessment:  Pt has an ileal conduit urostomy that is pink and appears healthy.  MCJ is intact.  Peristomal skin is intact with no significant erythema present, no denudement, no signs of any type of infection.  Pt has c/o generalized pain around the stoma and concerns of her belt line and the stoma placement which are on the same horizontal plane.      Factors impacting wound healing:   Poor nutrition: inadequate supply of protein, carbohydrates, fatty acids, and trace elements essential for all phases of wound healing  Reduced Blood Supply: inadequate perfusion to heal wound  Medication: NA  Chemotherapy: suppresses the immune system and inflammatory response  Radiotherapy: increases production of free radical which damage cells  Psychological stress: related to ache and soreness of abdomen  Obesity: decreases tissue perfusion, NA  Infection: prolongs inflammatory phase, uses vital nutrients, impairs epithelialization and releases toxins, none noted  Underlying Disease: bladder ca  Maceration: reduces wound tensile strength and inhibits epithelial migration, none noted  Patient compliance, appears motivate to heal  Unrelieved pressure, NA  Immobility, NA  Substance abuse: NA    Plan:  Today we changed out the pt's urostomy appliance and tried a two piece Cobb set up with a convex skin barrier, this is a cut to fit item but we can get them precut if she likes them.  We added the regular round flat ostomy ring to the edge of the 1 inch hole.  The 1 inch hole appears appropriate and she is to continue with cutting to 1 inch or using precut appliances the same.     I sent her with two more of these same set up for trial.  I will order her some more samples from [employer] in both the one and two piece convex products.  I instructed the pt that once she receives them to to try them or bring all samples with to her next visit in clinic for us to  review.    Topical care: As listed above  Offloading: NA  Additional recommendations:NA    Wound Care: NA no wound.  Ostomy Cares: as listed in Plan.    Discussed plan of care with patient. Teaching done with patient for ostomy appliance changes changes; she is able and willing to perform.    The following discharge instructions were reviewed with and sent home with the patient:  See AVS    The following supplies were sent home with the patient:  As listed in plan.  Will reassess care plan in 2 weeks and order patient supplies as needed    Return visit: 4/28/22    Verbal, written, & demonstrative education provided.  Face to face time: approximately 30 minutes.  Procedure: LIV    [phone]

## 2022-04-14 NOTE — DISCHARGE INSTRUCTIONS
Today we changed out your urostomy appliance and tried a two piece Tara set up with a convex skin barrier, this is a cut to fit item but we can get them precut if you like them.  We did add the regular round flat ostomy ring to the edge of the 1 inch hole.    I will send you with two more of these same set up for trial.    I will order you some samples from [employer] in both the one and two piece convex products.  These when you receive feel free to try them or you can bring all samples with to your next visit in clinic for us to review.    I can be reached at [phone], they will take a message and I can call you back.    I have you scheduled to see me again in two weeks on Thursday April the 28 th at 10 am here in the Wound and Ostomy clinic.    Gerardo Kilpatrick RN cwocn

## 2022-04-15 ENCOUNTER — TELEPHONE (OUTPATIENT)
Dept: ONCOLOGY | Facility: CLINIC | Age: 72
End: 2022-04-15
Payer: COMMERCIAL

## 2022-04-15 DIAGNOSIS — Z85.51 PERSONAL HISTORY OF MALIGNANT NEOPLASM OF BLADDER: ICD-10-CM

## 2022-04-15 DIAGNOSIS — D70.1 CHEMOTHERAPY-INDUCED NEUTROPENIA (H): Primary | ICD-10-CM

## 2022-04-15 DIAGNOSIS — C67.8 MALIGNANT NEOPLASM OF OVERLAPPING SITES OF BLADDER (H): ICD-10-CM

## 2022-04-15 DIAGNOSIS — Z43.6 ATTENTION TO UROSTOMY (H): ICD-10-CM

## 2022-04-15 DIAGNOSIS — C67.9 UROTHELIAL CARCINOMA OF BLADDER WITH INVASION OF MUSCLE (H): ICD-10-CM

## 2022-04-15 DIAGNOSIS — T45.1X5A CHEMOTHERAPY-INDUCED NEUTROPENIA (H): Primary | ICD-10-CM

## 2022-04-15 NOTE — TELEPHONE ENCOUNTER
Pt has been seeing Gerardo GALVAN wound care at [facility]. He needs a referral placed for him to continue wound care. Thank You [place]

## 2022-04-15 NOTE — TELEPHONE ENCOUNTER
Patient has a urology appointment on 5/10/22. Will route to urologist to place wound orders after visit.         Aura Oliveira RN on 4/15/2022 at 3:32 PM

## 2022-04-15 NOTE — TELEPHONE ENCOUNTER
Patient has a urostomy that is being managed by the WOCN at [facility]. The WOCN needs orders to continue to with urostomy management.

## 2022-04-15 NOTE — TELEPHONE ENCOUNTER
Gerardo Bolaños RN in wound care is needing a referral placed for Michael's wound care. Thank You [place]

## 2022-04-26 ENCOUNTER — MYC MEDICAL ADVICE (OUTPATIENT)
Dept: FAMILY MEDICINE | Facility: OTHER | Age: 72
End: 2022-04-26
Payer: COMMERCIAL

## 2022-04-26 ENCOUNTER — OFFICE VISIT (OUTPATIENT)
Dept: FAMILY MEDICINE | Facility: CLINIC | Age: 72
End: 2022-04-26
Payer: COMMERCIAL

## 2022-04-26 ENCOUNTER — TELEPHONE (OUTPATIENT)
Dept: FAMILY MEDICINE | Facility: OTHER | Age: 72
End: 2022-04-26

## 2022-04-26 VITALS
DIASTOLIC BLOOD PRESSURE: 70 MMHG | TEMPERATURE: 98.1 F | OXYGEN SATURATION: 98 % | WEIGHT: 143.8 LBS | SYSTOLIC BLOOD PRESSURE: 132 MMHG | BODY MASS INDEX: 23.21 KG/M2 | HEART RATE: 72 BPM | RESPIRATION RATE: 16 BRPM

## 2022-04-26 DIAGNOSIS — T50.905A ADVERSE EFFECT OF DRUG, INITIAL ENCOUNTER: Primary | ICD-10-CM

## 2022-04-26 PROCEDURE — 99213 OFFICE O/P EST LOW 20 MIN: CPT | Performed by: PHYSICIAN ASSISTANT

## 2022-04-26 RX ORDER — TRIAMCINOLONE ACETONIDE 1 MG/G
CREAM TOPICAL
Qty: 453 G | Refills: 0 | Status: SHIPPED | OUTPATIENT
Start: 2022-04-26 | End: 2022-05-10

## 2022-04-26 ASSESSMENT — PAIN SCALES - GENERAL: PAINLEVEL: NO PAIN (0)

## 2022-04-26 NOTE — PATIENT INSTRUCTIONS
Triamcinolone cream as directed.  Eucerin cream to whole body daily, especially right after bathing.  Avoid lotions with a scent as these can be irritating.  Take Zyrtec (cetirizine) every morning and benadryl (diaphenhydramine) every evening.  You are not contagious.  Cool compresses, ice packs, cooler showers for itching relief.  Baking soda paste, calamine lotion or aveeno oatmeal packs for itching.  Rub with affected are with ice cube.  Avoid sunlight and heat.  Avoid scratching to prevent secondary infection.  Watch for any signs of infection - (fever, bright red color, hot to the touch, firm to the touch,  more pain and/or discharge that is cream- yellow/white/green) and follow up if you notice these as an antibiotic may be needed.

## 2022-04-26 NOTE — TELEPHONE ENCOUNTER
Face-to-face visit would be wise.  Please help her schedule.  Electronically signed:    Phani Jason PA-C

## 2022-04-26 NOTE — PROGRESS NOTES
Assessment & Plan     Adverse effect of drug, initial encounter  - triamcinolone (KENALOG) 0.1 % external cream; Apply to affected area twice daily for up to 14 days.    This is almost certainly a reaction to her Opdivo.  We discussed using Eucerin cream to help keep skin hydrated.  Follow-up with PCP if symptoms worsen or fail to improve.    20 minutes spent on the date of the encounter doing chart review, patient visit and documentation     Return in about 2 weeks (around 5/10/2022) for visit with primary care provider if not improving.    Roselia Real PA-C  [facility]   Lissa is a 71 year old who presents for the following health issues  Rash  Associated symptoms include a rash.   History of Present Illness       Reason for visit:  Rash and itching  Symptoms include:  Rash and itching  Symptom intensity:  Severe  Symptom progression:  Worsening  Had these symptoms before:  No  What makes it worse:  Morning time it s worse  What makes it better:  Aspercreme    She eats 0-1 servings of fruits and vegetables daily.She consumes 0 sweetened beverage(s) daily.She exercises with enough effort to increase her heart rate 60 or more minutes per day.  She exercises with enough effort to increase her heart rate 6 days per week.   She is taking medications regularly.     Lissa presents to clinic today for evaluation of a rash.  She states she started Opdivo for bladder cancer in January and right away developed a rash.  She developed several other intolerable side effects and so her last dose was on March 4.  Most of the other side effects resolved however the rash has gotten slightly worse.  It is present on bilateral arms, upper chest and upper back.  It is itchy throughout the day but she notes that the itching is worse in the morning.  She has not had any discharge from the rash but does have several scabs from secondary excoriations.  She is tried using hydrocortisone cream and  Aspercreme and neither has made a huge difference.  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    Review of Systems   Skin: Positive for rash.          Objective    /70 (BP Location: Right arm, Patient Position: Chair, Cuff Size: Adult Regular)   Pulse 72   Temp 98.1  F (36.7  C) (Temporal)   Resp 16   Wt 65.2 kg (143 lb 12.8 oz)   SpO2 98%   BMI 23.21 kg/m    Body mass index is 23.21 kg/m .  Physical Exam   GENERAL: healthy, alert and no distress  NECK: no adenopathy, no asymmetry, masses, or scars and thyroid normal to palpation  SKIN: Many scattered erythematous papules with secondary excoriations located on bilateral arms, upper chest and upper back.  See photo below.

## 2022-04-26 NOTE — TELEPHONE ENCOUNTER
Contacted patient and scheduled her for an appointment this evening in [place].   Fallon Spencer RN on 4/26/2022 at 11:45 AM

## 2022-04-26 NOTE — TELEPHONE ENCOUNTER
Reason for Call:  Form, our goal is to have forms completed with 72 hours, however, some forms may require a visit or additional information.    Type of letter, form or note:  written order    Who is the form from?: [facility] [employer] (if other please explain)    Where did the form come from: form was faxed in    What clinic location was the form placed at?: [facility] [phone]    Where the form was placed: Team D Box/Folder    What number is listed as a contact on the form?:  [phone]       Additional comments:  Fax [phone]    Call taken on 4/26/2022 at 10:44 AM by Shania Carr

## 2022-04-27 DIAGNOSIS — E78.5 HYPERLIPIDEMIA LDL GOAL <130: ICD-10-CM

## 2022-04-27 DIAGNOSIS — F43.21 ADJUSTMENT DISORDER WITH DEPRESSED MOOD: ICD-10-CM

## 2022-04-27 DIAGNOSIS — F41.9 ANXIETY: ICD-10-CM

## 2022-04-28 ENCOUNTER — MEDICAL CORRESPONDENCE (OUTPATIENT)
Dept: HEALTH INFORMATION MANAGEMENT | Facility: CLINIC | Age: 72
End: 2022-04-28
Payer: COMMERCIAL

## 2022-04-28 RX ORDER — ESCITALOPRAM OXALATE 10 MG/1
TABLET ORAL
Qty: 90 TABLET | Refills: 1 | OUTPATIENT
Start: 2022-04-28

## 2022-04-28 RX ORDER — ATORVASTATIN CALCIUM 20 MG/1
20 TABLET, FILM COATED ORAL DAILY
Qty: 90 TABLET | Refills: 3 | Status: SHIPPED | OUTPATIENT
Start: 2022-04-28 | End: 2023-07-07

## 2022-04-28 NOTE — TELEPHONE ENCOUNTER
"Pending Prescriptions:                       Disp   Refills    atorvastatin (LIPITOR) 20 MG tablet        90 tab*1        Sig: Take 1 tablet (20 mg) by mouth daily    Routing refill request to provider for review/approval because:  Labs not current:    A break in medication    Requested Prescriptions   Pending Prescriptions Disp Refills    atorvastatin (LIPITOR) 20 MG tablet 90 tablet 1     Sig: Take 1 tablet (20 mg) by mouth daily        Statins Protocol Failed - 4/27/2022  1:22 PM        Failed - LDL on file in past 12 months     Recent Labs   Lab Test 10/08/20  0606   LDL 87               Passed - No abnormal creatine kinase in past 12 months     No lab results found.             Passed - Recent (12 mo) or future (30 days) visit within the authorizing provider's specialty     Patient has had an office visit with the authorizing provider or a provider within the authorizing providers department within the previous 12 mos or has a future within next 30 days. See \"Patient Info\" tab in inbasket, or \"Choose Columns\" in Meds & Orders section of the refill encounter.              Passed - Medication is active on med list        Passed - Patient is age 18 or older        Passed - No active pregnancy on record        Passed - No positive pregnancy test in past 12 months                  "

## 2022-04-29 ENCOUNTER — TELEPHONE (OUTPATIENT)
Dept: ONCOLOGY | Facility: CLINIC | Age: 72
End: 2022-04-29
Payer: COMMERCIAL

## 2022-04-29 NOTE — TELEPHONE ENCOUNTER
Lab calling asking for lab orders for patient's appt. Lab notified to contact infusion to release orders when patient arrives for her appt.    Carol Rollins RN on 4/29/2022 at 1:53 PM

## 2022-05-02 ENCOUNTER — LAB (OUTPATIENT)
Dept: INFUSION THERAPY | Facility: CLINIC | Age: 72
End: 2022-05-02
Attending: INTERNAL MEDICINE
Payer: MEDICARE

## 2022-05-02 ENCOUNTER — MYC MEDICAL ADVICE (OUTPATIENT)
Dept: FAMILY MEDICINE | Facility: OTHER | Age: 72
End: 2022-05-02

## 2022-05-02 ENCOUNTER — HOSPITAL ENCOUNTER (OUTPATIENT)
Dept: CT IMAGING | Facility: CLINIC | Age: 72
Discharge: HOME | End: 2022-05-02
Attending: INTERNAL MEDICINE
Payer: MEDICARE

## 2022-05-02 DIAGNOSIS — Z51.11 ENCOUNTER FOR ANTINEOPLASTIC CHEMOTHERAPY: ICD-10-CM

## 2022-05-02 DIAGNOSIS — Z17.1 MALIGNANT NEOPLASM OF UPPER-OUTER QUADRANT OF LEFT BREAST IN FEMALE, ESTROGEN RECEPTOR NEGATIVE (H): ICD-10-CM

## 2022-05-02 DIAGNOSIS — C67.8 MALIGNANT NEOPLASM OF OVERLAPPING SITES OF BLADDER (H): Primary | ICD-10-CM

## 2022-05-02 DIAGNOSIS — E83.42 HYPOMAGNESEMIA: ICD-10-CM

## 2022-05-02 DIAGNOSIS — N18.31 STAGE 3A CHRONIC KIDNEY DISEASE (H): ICD-10-CM

## 2022-05-02 DIAGNOSIS — C50.412 MALIGNANT NEOPLASM OF UPPER-OUTER QUADRANT OF LEFT BREAST IN FEMALE, ESTROGEN RECEPTOR NEGATIVE (H): ICD-10-CM

## 2022-05-02 DIAGNOSIS — D64.9 ANEMIA, UNSPECIFIED TYPE: ICD-10-CM

## 2022-05-02 DIAGNOSIS — C67.8 MALIGNANT NEOPLASM OF OVERLAPPING SITES OF BLADDER (H): ICD-10-CM

## 2022-05-02 DIAGNOSIS — C77.5 SECONDARY AND UNSPECIFIED MALIGNANT NEOPLASM OF INTRAPELVIC LYMPH NODES (H): ICD-10-CM

## 2022-05-02 LAB
ALBUMIN SERPL-MCNC: 4.1 G/DL (ref 3.4–5)
ALP SERPL-CCNC: 44 U/L (ref 40–150)
ALT SERPL W P-5'-P-CCNC: 26 U/L (ref 0–50)
ANION GAP SERPL CALCULATED.3IONS-SCNC: 5 MMOL/L (ref 3–14)
AST SERPL W P-5'-P-CCNC: 21 U/L (ref 0–45)
BILIRUB SERPL-MCNC: 0.3 MG/DL (ref 0.2–1.3)
BUN SERPL-MCNC: 22 MG/DL (ref 7–30)
CALCIUM SERPL-MCNC: 9.6 MG/DL (ref 8.5–10.1)
CHLORIDE BLD-SCNC: 110 MMOL/L (ref 94–109)
CO2 SERPL-SCNC: 25 MMOL/L (ref 20–32)
CREAT BLD-MCNC: 1 MG/DL (ref 0.5–1)
CREAT SERPL-MCNC: 0.89 MG/DL (ref 0.52–1.04)
FERRITIN SERPL-MCNC: 49 NG/ML (ref 8–252)
GFR SERPL CREATININE-BSD FRML MDRD: 60 ML/MIN/1.73M2
GFR SERPL CREATININE-BSD FRML MDRD: 69 ML/MIN/1.73M2
GLUCOSE BLD-MCNC: 89 MG/DL (ref 70–99)
HGB BLD-MCNC: 10.9 G/DL (ref 11.7–15.7)
IRON SATN MFR SERPL: 30 % (ref 15–46)
IRON SERPL-MCNC: 103 UG/DL (ref 35–180)
PHOSPHATE SERPL-MCNC: 3.4 MG/DL (ref 2.5–4.5)
POTASSIUM BLD-SCNC: 4.4 MMOL/L (ref 3.4–5.3)
PROT SERPL-MCNC: 7.3 G/DL (ref 6.8–8.8)
PTH-INTACT SERPL-MCNC: 86 PG/ML (ref 18–80)
SODIUM SERPL-SCNC: 140 MMOL/L (ref 133–144)
TIBC SERPL-MCNC: 341 UG/DL (ref 240–430)
TSH SERPL DL<=0.005 MIU/L-ACNC: 1.83 MU/L (ref 0.4–4)

## 2022-05-02 PROCEDURE — 74177 CT ABD & PELVIS W/CONTRAST: CPT

## 2022-05-02 PROCEDURE — 999N000130 HC STATISTIC PORT-A-CATH ACCESS/FLUSHING: Performed by: INTERNAL MEDICINE

## 2022-05-02 PROCEDURE — 84100 ASSAY OF PHOSPHORUS: CPT

## 2022-05-02 PROCEDURE — 250N000011 HC RX IP 250 OP 636: Performed by: INTERNAL MEDICINE

## 2022-05-02 PROCEDURE — 250N000009 HC RX 250: Performed by: INTERNAL MEDICINE

## 2022-05-02 PROCEDURE — 85018 HEMOGLOBIN: CPT

## 2022-05-02 PROCEDURE — 84443 ASSAY THYROID STIM HORMONE: CPT | Performed by: INTERNAL MEDICINE

## 2022-05-02 PROCEDURE — 82565 ASSAY OF CREATININE: CPT

## 2022-05-02 PROCEDURE — 83970 ASSAY OF PARATHORMONE: CPT

## 2022-05-02 PROCEDURE — 36591 DRAW BLOOD OFF VENOUS DEVICE: CPT | Performed by: INTERNAL MEDICINE

## 2022-05-02 PROCEDURE — 82728 ASSAY OF FERRITIN: CPT

## 2022-05-02 PROCEDURE — 80053 COMPREHEN METABOLIC PANEL: CPT | Performed by: INTERNAL MEDICINE

## 2022-05-02 PROCEDURE — 83550 IRON BINDING TEST: CPT

## 2022-05-02 RX ORDER — HEPARIN SODIUM (PORCINE) LOCK FLUSH IV SOLN 100 UNIT/ML 100 UNIT/ML
5 SOLUTION INTRAVENOUS
Status: CANCELLED | OUTPATIENT
Start: 2022-05-02

## 2022-05-02 RX ORDER — HEPARIN SODIUM (PORCINE) LOCK FLUSH IV SOLN 100 UNIT/ML 100 UNIT/ML
5 SOLUTION INTRAVENOUS
Status: DISCONTINUED | OUTPATIENT
Start: 2022-05-02 | End: 2022-05-05 | Stop reason: HOSPADM

## 2022-05-02 RX ORDER — IOPAMIDOL 755 MG/ML
500 INJECTION, SOLUTION INTRAVASCULAR ONCE
Status: COMPLETED | OUTPATIENT
Start: 2022-05-02 | End: 2022-05-02

## 2022-05-02 RX ADMIN — Medication 5 ML: at 12:26

## 2022-05-02 RX ADMIN — SODIUM CHLORIDE 60 ML: 9 INJECTION, SOLUTION INTRAVENOUS at 12:11

## 2022-05-02 RX ADMIN — IOPAMIDOL 70 ML: 755 INJECTION, SOLUTION INTRAVENOUS at 12:12

## 2022-05-02 NOTE — PROGRESS NOTES
Infusion Nursing Note:  Michaela Dorman presents today for Port Access for CT.    Patient seen by provider today: No   present during visit today: Not Applicable.    Note: N/A.      Intravenous Access:  Implanted Port.    Treatment Conditions:  Not Applicable.      Post Infusion Assessment:  Blood return noted pre and post CT.  Site patent and intact, free from redness, edema or discomfort.  No evidence of extravasations.  Access discontinued per protocol.       Discharge Plan:   Discharge instructions reviewed with: Patient.  Patient and/or family verbalized understanding of discharge instructions and all questions answered.  Patient discharged in stable condition accompanied by: self.  Departure Mode: Ambulatory.      Mirian Peralta RN

## 2022-05-02 NOTE — TELEPHONE ENCOUNTER
Routing to provider    MARY GRACE CarnesN, RN  [facility]  May 2, 2022

## 2022-05-05 ENCOUNTER — VIRTUAL VISIT (OUTPATIENT)
Dept: ONCOLOGY | Facility: CLINIC | Age: 72
End: 2022-05-05
Attending: INTERNAL MEDICINE
Payer: COMMERCIAL

## 2022-05-05 DIAGNOSIS — D64.9 NORMOCYTIC ANEMIA: ICD-10-CM

## 2022-05-05 DIAGNOSIS — G03.9 MENINGITIS: ICD-10-CM

## 2022-05-05 DIAGNOSIS — C67.8 MALIGNANT NEOPLASM OF OVERLAPPING SITES OF BLADDER (H): Primary | ICD-10-CM

## 2022-05-05 DIAGNOSIS — R21 RASH: ICD-10-CM

## 2022-05-05 PROCEDURE — 99214 OFFICE O/P EST MOD 30 MIN: CPT | Mod: 95 | Performed by: INTERNAL MEDICINE

## 2022-05-05 NOTE — LETTER
2022         RE: Michaela Dorman  [address]  [place] [address]        Dear Colleague,    Thank you for referring your patient, Michaela Dorman, to the [facility] [place]. Please see a copy of my visit note below.    [facility] Hematology / Oncology  Progress Note  Name: Michaela Dorman  :  1950    MRN:  [id number]    --------------------    Assessment / Plan:  Left-sided ER-, HER2+ breast CA:  # Sep 2016 Presented w/ left axillary mass; staging multicentric T4 lesion w/ geraldine involvement; biopsy w/ ER-, HER2+ breast cancer  # Sep 2016 - 2017 Neoadjuvant AC x 4 cycles/TH x 4 cycles.  # 2017 Bilateral mastectomy no with geraldine evaluation; complete path CR from invasive cancer; 7 mm DCIS residual.  # 2017 - May 2017 Adjuvant radiation to left chestwall / axilla.  # 2017 - Dec 2017 Adjuvant Herceptin.    Bladder cancer:([id number])  # Oct 2020 Presented w/ dysuria.  # 2021 Cytoscopy w/ muscle-invasive high grade urothelial cancer.  # Mar 2021 TURBT.  # 2021 Neoadjuvant cisplatin/gem split dose complicated by TAMIR.  # May 2021 Neoadjuvant carboplatin/gem.  # 2021 Radical cystectomy w/ ileal conduit; path high grade urothelial carcinoma muscle invasive; margins negs; nodes negative.  # 2021 Adjuvant opdivo; planning 12 months.    Kristi remains well clinically.  Radiographically she has no signs of recurrence.  We reviewed her labs that shows a very mild anemia.  I encouraged some iron intake or an iron supplement to help manage this.  We will plan to check a ferritin as well as repeat a CBC when she comes back.  We reviewed her imaging together that shows no signs of recurrence.  There are some incidental chronic findings.  We did spend some time reviewing what looks like a lymphocele that is been stable postoperatively going back to  - some waxing and waning.  And actually a little bit concerned that she had shingles the way back  which was the rash predominantly in the right upper extremity.  We are waiting on the point of starting on acyclovir but I think for now we can continue to monitor.  Fortunately her meningitis/headache symptoms from immune therapy have abated completely as well.  The fact that the rash came around the time of the prednisone burst and did not get better on prednisone is a bit more suggestive of shingles rather than immune dermatitis.    Girish Sauer MD    --------------------    Interval History:  Michaela returns for follow up bladder and breast cancer.  All in all, Kristi describes that her headaches are nearly resolved.  She has been off prednisone for some time now.  She did develop a rash predominantly on the right upper extremity.  She was evaluated and started on some steroid creams.  Beyond that no new complaints.  Stable weight appetite and energy.  The rash is itchy and has improved over time.    --------------------    Physical Exam:  Video visit.    Labs / Imaging / Path:  We reviewed labs and personally reviewed imaging.    Video Visit:  Michaela is a 71 year old female who is being evaluated via a billable video visit.  }    Video start time: 10:13 AM  Video end time: 10:29 AM    Provider location: [facility]  Patient location: Home    Mode of transmission: [employer] / [employer].        Again, thank you for allowing me to participate in the care of your patient.        Sincerely,        Girish Sauer MD

## 2022-05-05 NOTE — PATIENT INSTRUCTIONS
BJT DE 4 months 15 mins w/ CT CAP and labs (CBC, CMP, TSH, ferritin).    Girish Sauer MD.    Today:  05/05/22    Please follow up with Dr. Sauer in 4 months.  Please schedule CT CAP and labs prior to follow up appointment.    Lab Date/Time: infusion to access/draw 09/06/22 at 10:00AM  & CT CAP 09/06/22 at 10:30AM    Video visit follow up with Dr. Sauer Date/Time: 09/08/2022 at 10:00AM     If you have any questions or concerns please feel free to call.    If you need to reschedule please call:  Clinic or Lab Appointment - [phone]  Infusion - [phone]  Imaging - [phone]    Noemi Garcia RN, BSN  [facility]   [phone]  5/5/2022, 11:33 AM    After Hours Oncology Nurse Line - [phone]

## 2022-05-05 NOTE — PROGRESS NOTES
[facility] Hematology / Oncology  Progress Note  Name: Michaela Dorman  :  1950    MRN:  [id number]    --------------------    Assessment / Plan:  Left-sided ER-, HER2+ breast CA:  # Sep 2016 Presented w/ left axillary mass; staging multicentric T4 lesion w/ geraldine involvement; biopsy w/ ER-, HER2+ breast cancer  # Sep 2016 - 2017 Neoadjuvant AC x 4 cycles/TH x 4 cycles.  # 2017 Bilateral mastectomy no with geraldine evaluation; complete path CR from invasive cancer; 7 mm DCIS residual.  # 2017 - May 2017 Adjuvant radiation to left chestwall / axilla.  # 2017 - Dec 2017 Adjuvant Herceptin.    Bladder cancer:([id number])  # Oct 2020 Presented w/ dysuria.  # 2021 Cytoscopy w/ muscle-invasive high grade urothelial cancer.  # Mar 2021 TURBT.  # 2021 Neoadjuvant cisplatin/gem split dose complicated by TAMIR.  # May 2021 Neoadjuvant carboplatin/gem.  # 2021 Radical cystectomy w/ ileal conduit; path high grade urothelial carcinoma muscle invasive; margins negs; nodes negative.  # 2021 Adjuvant opdivo; planning 12 months.    Kristi remains well clinically.  Radiographically she has no signs of recurrence.  We reviewed her labs that shows a very mild anemia.  I encouraged some iron intake or an iron supplement to help manage this.  We will plan to check a ferritin as well as repeat a CBC when she comes back.  We reviewed her imaging together that shows no signs of recurrence.  There are some incidental chronic findings.  We did spend some time reviewing what looks like a lymphocele that is been stable postoperatively going back to  - some waxing and waning.  And actually a little bit concerned that she had shingles the way back which was the rash predominantly in the right upper extremity.  We are waiting on the point of starting on acyclovir but I think for now we can continue to monitor.  Fortunately her meningitis/headache symptoms from immune therapy have abated completely as  well.  The fact that the rash came around the time of the prednisone burst and did not get better on prednisone is a bit more suggestive of shingles rather than immune dermatitis.    Girish Sauer MD    --------------------    Interval History:  Michaela returns for follow up bladder and breast cancer.  All in all, Kristi describes that her headaches are nearly resolved.  She has been off prednisone for some time now.  She did develop a rash predominantly on the right upper extremity.  She was evaluated and started on some steroid creams.  Beyond that no new complaints.  Stable weight appetite and energy.  The rash is itchy and has improved over time.    --------------------    Physical Exam:  Video visit.    Labs / Imaging / Path:  We reviewed labs and personally reviewed imaging.    Video Visit:  Michaela is a 71 year old female who is being evaluated via a billable video visit.  }    Video start time: 10:13 AM  Video end time: 10:29 AM    Provider location: [facility]  Patient location: Home    Mode of transmission:  [employer] / [employer].

## 2022-05-06 ENCOUNTER — MYC MEDICAL ADVICE (OUTPATIENT)
Dept: FAMILY MEDICINE | Facility: OTHER | Age: 72
End: 2022-05-06
Payer: COMMERCIAL

## 2022-05-10 ENCOUNTER — VIRTUAL VISIT (OUTPATIENT)
Dept: UROLOGY | Facility: CLINIC | Age: 72
End: 2022-05-10
Payer: COMMERCIAL

## 2022-05-10 VITALS — WEIGHT: 135 LBS | BODY MASS INDEX: 21.69 KG/M2 | HEIGHT: 66 IN

## 2022-05-10 DIAGNOSIS — F10.10 ETOH ABUSE: ICD-10-CM

## 2022-05-10 DIAGNOSIS — C67.8 MALIGNANT NEOPLASM OF OVERLAPPING SITES OF BLADDER (H): Primary | ICD-10-CM

## 2022-05-10 PROCEDURE — 99213 OFFICE O/P EST LOW 20 MIN: CPT | Mod: 95 | Performed by: UROLOGY

## 2022-05-10 ASSESSMENT — PAIN SCALES - GENERAL: PAINLEVEL: NO PAIN (0)

## 2022-05-10 NOTE — NURSING NOTE
"Chief Complaint   Patient presents with     Follow Up     3-month follow up w/ labs and imaging       Height 1.676 m (5' 6\"), weight 61.2 kg (135 lb), not currently breastfeeding. Body mass index is 21.79 kg/m .    Patient Active Problem List   Diagnosis     Enthesopathy of hip region     Family history of stroke (cerebrovascular)     Trochanteric bursitis     Advanced directives, counseling/discussion     Health Care Home     Hypertension goal BP (blood pressure) < 140/90     Baker's cyst of knee     Patella-femoral syndrome     Adjustment disorder with depressed mood     Essential hypertension     Malignant neoplasm of upper-outer quadrant of left breast in female, estrogen receptor negative (H)     Encounter for antineoplastic chemotherapy     S/P bilateral mastectomy     Anxiety     Hyperlipidemia LDL goal <130     S/P reverse total shoulder arthroplasty, right     Prophylactic antibiotic for dental procedure indicated due to prior joint replacement     Chronic pain syndrome     Lumbar radiculopathy     Foraminal stenosis of lumbar region     Central stenosis of spinal canal     DDD (degenerative disc disease), lumbar     Stage 3a chronic kidney disease (H)     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)     Magallanes's neuroma of right foot     Bilateral impacted cerumen     S/P lumbar fusion     Anemia     Carotid stenosis, bilateral L80%, R40%     Carotid artery plaque, bilateral     POSSIBLE Right internal carotid artery aneurysm     Symptomatic stenosis of left carotid artery     Personal history of malignant neoplasm of breast     Acute cystitis with hematuria     Benign paroxysmal positional vertigo, bilateral     Personal history of malignant neoplasm of bladder     Malignant neoplasm of overlapping sites of bladder (H)     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)     Chemotherapy-induced neutropenia (H)     Thrombocytopenia (H)     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     Constipation, unspecified constipation type     Imaging abnormalities     Hypomagnesemia     Need for prophylactic vaccination and inoculation against influenza     Rheumatoid arthritis of multiple sites with negative rheumatoid factor (H)     Parathyroid abnormality (H)       Allergies   Allergen Reactions     Oxybutynin      Patient reports symptoms of nausea and mind fogginess       Current Outpatient Medications   Medication Sig Dispense Refill     acetaminophen (TYLENOL) 500 MG tablet Take 1,000 mg by mouth 3 times daily as needed for mild pain (500MG X 2 = 1,000MG)       ALPRAZolam (XANAX) 0.5 MG tablet TAKE 1 TABLET BY MOUTH DAILY AT BEDTIME AS NEEDED FOR SLEEP       aspirin (ASA) 81 MG chewable tablet Take 1 tablet (81 mg) by mouth daily 100 tablet 3     atorvastatin (LIPITOR) 20 MG tablet Take 1 tablet (20 mg) by mouth daily 90 tablet 3     buPROPion (WELLBUTRIN XL) 150 MG 24 hr tablet Take 1 tablet (150 mg) by mouth every morning 90 tablet 3     carvedilol (COREG) 6.25 MG tablet Take 1 tablet (6.25 mg) by mouth 2 times daily (with meals) 60 tablet 11     Cyanocobalamin (B-12 PO) Take by mouth daily        levothyroxine (SYNTHROID/LEVOTHROID) 25 MCG tablet TAKE 1 TABLET (25 MCG) BY MOUTH DAILY 90 tablet 0     LORazepam (ATIVAN) 0.5 MG tablet TAKE 1 TABLET BY MOUHT EVERY 4 HOURS AS NEEDED FORANXIETY, NAUSEA, VOMITING OR SLEEP (Patient taking differently: Take 0.5 mg by mouth every 4 hours as needed for anxiety, nausea, sleep or vomiting) 30 tablet 2     meclizine 25 MG CHEW Take 25 mg by mouth every 6 hours as needed for dizziness        METAMUCIL FIBER PO Take 2 teaspoonful by mouth daily  (Patient not taking: Reported on 4/26/2022)       methocarbamol (ROBAXIN) 750 MG tablet Take 1 tablet (750 mg) by mouth 4 times daily as needed for muscle spasms (or abdominal pain) 30 tablet 0     naltrexone (DEPADE/REVIA) 50 MG tablet TAKE 1 TABLET (50 MG) BY MOUTH DAILY (Patient not taking: Reported on  2022) 30 tablet 0     nivolumab (OPDIVO) 100 MG/10ML Inject into the vein every 30 days  (Patient not taking: Reported on 2022)       omeprazole (PRILOSEC) 20 MG DR capsule Take 20 mg by mouth daily       oxyCODONE-acetaminophen (PERCOCET) 5-325 MG tablet Take 0.5-1 tablets by mouth every 6 hours as needed for severe pain 60 tablet 0     predniSONE (DELTASONE) 20 MG tablet One tab (20 mg) PO qday PRN immunotherapy-related headaches. (Patient not taking: Reported on 2022) 5 tablet 0     Probiotic Product (PROBIOTIC PO) Take by mouth daily  (Patient not taking: Reported on 2022)       prochlorperazine (COMPAZINE) 10 MG tablet TAKE 1/2 TABLET (5 MG) BY MOUTH EVERY 6 HOURS AS NEEDED (NAUSEA/VOMITING) 30 tablet 2     SENNA-docusate sodium (SENNA S) 8.6-50 MG tablet Take 1 tablet by mouth At Bedtime 30 tablet 0     STOOL SOFTENER/LAXATIVE 50-8.6 MG tablet TAKE 2 TABLETS BY MOUTH DAILY AT BEDTIME (Patient taking differently: Take 2 tablets by mouth At Bedtime) 100 tablet 0     triamcinolone (KENALOG) 0.1 % external cream Apply to affected area twice daily for up to 14 days. 453 g 0       Social History     Tobacco Use     Smoking status: Former Smoker     Packs/day: 3.00     Years: 10.00     Pack years: 30.00     Types: Cigarettes     Quit date: 1979     Years since quittin.4     Smokeless tobacco: Never Used     Tobacco comment: approx. 3 packs daily   Vaping Use     Vaping Use: Never used   Substance Use Topics     Alcohol use: Yes     Alcohol/week: 0.0 standard drinks     Comment: daily glass of wine     Drug use: No       Ju Carter, EMT  5/10/2022  2:14 PM

## 2022-05-10 NOTE — TELEPHONE ENCOUNTER
Pending Prescriptions:                       Disp   Refills    naltrexone (DEPADE/REVIA) 50 MG tablet [Ph*30 tab*0        Sig: TAKE 1 TABLET BY MOUTH EVERY DAY    Routing refill request to provider for review/approval because:  Drug not on the [facility] refill protocol   A break in medication  naltrexone (DEPADE/REVIA) 50 MG tablet 30 tablet 0 2/7/2022  No   Sig - Route: TAKE 1 TABLET (50 MG) BY MOUTH DAILY - Oral   Patient not taking: Reported on 4/26/2022        Sent to pharmacy as: Naltrexone HCl 50 MG Oral Tablet (DEPADE/REVIA)   Class: E-Prescribe   Order: [id number]   E-Prescribing Status: Receipt confirmed by pharmacy (2/7/2022  5:29 PM CST)       Requested Prescriptions   Pending Prescriptions Disp Refills    naltrexone (DEPADE/REVIA) 50 MG tablet [Pharmacy Med Name: NALTREXONE 50MG TABLET] 30 tablet 0     Sig: TAKE 1 TABLET BY MOUTH EVERY DAY        There is no refill protocol information for this order

## 2022-05-10 NOTE — PROGRESS NOTES
Michaela Dorman is a 71 year old female who is being evaluated via a billable video visit.      How would you like to obtain your AVS? [id number]  If the video visit is dropped, the invitation should be resent by: Text to cell phone: [phone]  Will anyone else be joining your video visit? No    Video Start Time: 2:38 PM    CHIEF COMPLAINT   It was my pleasure to see Michaela Dorman who is a 71 year old female for follow-up of bladder cancer.      HPI  Michaela Dorman is a very pleasant 71 year old female who presents with a history of bladder cancer. She underwent cystectomy with ileal conduit 6/1/2021. Stage [id number] disease.   Had positive margin at urethral margin noted on initial path, but has now had remnant urethra removed and proximal margin resected, with no residual disease. Recovering well from this.     Doing adjuvant Opdivo and tolerating this well.  Cr 0.89    CT chest/abd/pelvis 5/2/2022  IMPRESSION:  1.  Stable postoperative appearance of a cystectomy with urinary  diversion and right lower quadrant urostomy. Nothing for recurrent or  metastatic disease in the chest, abdomen, or pelvis.     2.  Stable small 2-3 mm pulmonary nodules which are likely benign.     3.  No lymphadenopathy.     4.  Increased size of a cystic lesion in the anterior left pelvis  which has waxed and waned in size across multiple exams and is most  likely benign.    Urethrectomy 10/27/2021  Final Diagnosis   A.  Distal urethra, excision:  - Benign urethral and periurethral glands with mild chronic inflammation and fibrosis  - Negative for malignancy    B.  Distal urethra, proximal margins, excision:  - Benign muscle and connective tissue with mild fibrosis  - Negative for malignancy       Cystectomy with ileal conduit 6/1/2021  FINAL DIAGNOSIS:   C. Bladder, anterior vaginal wall, uterus, bilateral fallopian tubes,   ovaries and cervix:   - Invasive high grade urothelial carcinoma   - Carcinoma invades deep muscularis  propria   - Prior therapy related changes   - Urothelial carcinoma in-situ is present in the periurethral ducts at   urethral margin   - Pathologic stage: [id number]   - Benign uterus, cervix, vaginal wall, ovaries and fallopian tubes with   physiological changes   - See synoptic report          Allergies:   Oxybutynin         Review of Systems:  From intake questionnaire     Skin: negative  Eyes: negative  Ears/Nose/Throat: negative  Respiratory: No shortness of breath, dyspnea on exertion, cough, or hemoptysis  Cardiovascular: No chest pain or palpitations  Gastrointestinal: negative; no nausea/vomiting, constipation or diarrhea  Genitourinary: as per HPI  Musculoskeletal: negative  Neurologic: negative  Psychiatric: negative  Hematologic/Lymphatic/Immunologic: negative  Endocrine: negative         Physical Exam:     Vitals:  No vitals were obtained today due to virtual visit.    Physical Exam   GENERAL: Healthy, alert and no distress  EYES: Eyes grossly normal to inspection.  No discharge or erythema, or obvious scleral/conjunctival abnormalities.  RESP: No audible wheeze, cough, or visible cyanosis.  No visible retractions or increased work of breathing.    SKIN: Visible skin clear. No significant rash, abnormal pigmentation or lesions.  NEURO: Cranial nerves grossly intact.  Mentation and speech appropriate for age.  PSYCH: Mentation appears normal, affect normal/bright, judgement and insight intact, normal speech and appearance well-groomed.      Outside and Past Medical records:    Review of the result(s) of each unique test - CT, creat         Assessment and Plan:     71 year old female with stage ypT2N0 bladder cancer s/p radical cystectomy and ileal conduit 6/1/2021. Had remnant urethra removed with no malignancy noted. On adjuvant Opdivo and following with medical oncology. No recurrence noted today. Creat stable and no hydronephrosis. Overall doing well at this time. Will continue surveillance and adjuvant  treatment per medical oncology.     Plan:  - Follow-up 6 months to review CT. BMP and cytology as well.    Orders  Orders Placed This Encounter   Procedures     Basic metabolic panel     Video-Visit Details    Type of service:  Video Visit    Video End Time:2:47 PM    Originating Location (pt. Location): Home    Distant Location (provider location):  [facility] UROLOGY AND [facility] FOR PROSTATE AND UROLOGIC CANCERS    Platform used for Video Visit: [employer]    10 minutes spent on the date of the encounter including direct interaction with the patient, performing chart review, history and exam, documentation and further activities as noted above.    Leonardo Robles MD  Urology  [facility]

## 2022-05-11 RX ORDER — NALTREXONE HYDROCHLORIDE 50 MG/1
TABLET, FILM COATED ORAL
Qty: 90 TABLET | Refills: 3 | Status: SHIPPED | OUTPATIENT
Start: 2022-05-11 | End: 2022-11-16

## 2022-05-16 ENCOUNTER — TELEPHONE (OUTPATIENT)
Dept: FAMILY MEDICINE | Facility: OTHER | Age: 72
End: 2022-05-16
Payer: COMMERCIAL

## 2022-05-16 NOTE — TELEPHONE ENCOUNTER
Reason for Call:  Form, our goal is to have forms completed with 72 hours, however, some forms may require a visit or additional information.    Type of letter, form or note:  medical    Who is the form from?: [employer] (if other please explain)    Where did the form come from: form was faxed in    What clinic location was the form placed at?: [facility] [phone]    Where the form was placed: Team D Box/Folder    What number is listed as a contact on the form?: [phone]       Additional comments: Please complete form and return to [phone]    Call taken on 5/16/2022 at 5:09 PM by Virginie Caruso

## 2022-05-19 ENCOUNTER — MEDICAL CORRESPONDENCE (OUTPATIENT)
Dept: HEALTH INFORMATION MANAGEMENT | Facility: CLINIC | Age: 72
End: 2022-05-19
Payer: COMMERCIAL

## 2022-05-24 ENCOUNTER — TELEPHONE (OUTPATIENT)
Dept: FAMILY MEDICINE | Facility: OTHER | Age: 72
End: 2022-05-24
Payer: COMMERCIAL

## 2022-05-24 NOTE — TELEPHONE ENCOUNTER
"[employer]:     Order for Pouch premier urostomy w/convex barrier 2\"  Qty: 20 each per   Frequency of change: 1 daily    [facility]:   Diagnosis: Z93.2 iieostomy status    form and placed in providers bin for review    Kae Winters MA on 5/24/2022 at 2:14 PM      "

## 2022-05-24 NOTE — TELEPHONE ENCOUNTER
Reason for Call:  Form, our goal is to have forms completed with 72 hours, however, some forms may require a visit or additional information.    Type of letter, form or note:  medical    Who is the form from?: [employer] (if other please explain)    Where did the form come from: form was faxed in    What clinic location was the form placed at?: [facility] [phone]    Where the form was placed: Team D form bin    What number is listed as a contact on the form?: fax [phone]       Additional comments: Please complete and fax back    Call taken on 5/24/2022 at 1:34 PM by Nesha Romero

## 2022-05-25 ENCOUNTER — TELEPHONE (OUTPATIENT)
Dept: ONCOLOGY | Facility: CLINIC | Age: 72
End: 2022-05-25
Payer: COMMERCIAL

## 2022-05-25 NOTE — TELEPHONE ENCOUNTER
Reason for Call:  Form, our goal is to have forms completed with 72 hours, however, some forms may require a visit or additional information.    Type of letter, form or note:  written order    Who is the form from?: [facility] [employer] (if other please explain)    Where did the form come from: form was faxed in    What clinic location was the form placed at?: [facility] [phone]    Where the form was placed: Team D Box/Folder    What number is listed as a contact on the form?: [phone]       Additional comments:  Fax [phone]    Call taken on 5/25/2022 at 3:29 PM by Shania Carr

## 2022-05-26 ENCOUNTER — MEDICAL CORRESPONDENCE (OUTPATIENT)
Dept: HEALTH INFORMATION MANAGEMENT | Facility: CLINIC | Age: 72
End: 2022-05-26
Payer: COMMERCIAL

## 2022-05-26 ENCOUNTER — TELEPHONE (OUTPATIENT)
Dept: FAMILY MEDICINE | Facility: OTHER | Age: 72
End: 2022-05-26
Payer: COMMERCIAL

## 2022-05-26 DIAGNOSIS — C50.412 MALIGNANT NEOPLASM OF UPPER-OUTER QUADRANT OF LEFT BREAST IN FEMALE, ESTROGEN RECEPTOR NEGATIVE (H): ICD-10-CM

## 2022-05-26 DIAGNOSIS — Z17.1 MALIGNANT NEOPLASM OF UPPER-OUTER QUADRANT OF LEFT BREAST IN FEMALE, ESTROGEN RECEPTOR NEGATIVE (H): ICD-10-CM

## 2022-05-26 NOTE — TELEPHONE ENCOUNTER
Reason for Call:  Form, our goal is to have forms completed with 72 hours, however, some forms may require a visit or additional information.    Type of letter, form or note:  medical    Who is the form from?: [employer] (if other please explain)    Where did the form come from: form was faxed in    What clinic location was the form placed at?: [facility] [phone]    Where the form was placed: placed in provider bin    What number is listed as a contact on the form?: [phone]       Additional comments: Fax: [phone]    Call taken on 5/26/2022 at 8:53 AM by Roberta Shultz

## 2022-05-27 ENCOUNTER — MYC MEDICAL ADVICE (OUTPATIENT)
Dept: FAMILY MEDICINE | Facility: OTHER | Age: 72
End: 2022-05-27
Payer: COMMERCIAL

## 2022-05-27 DIAGNOSIS — R10.9 GASTRIC PAIN: Primary | ICD-10-CM

## 2022-05-27 RX ORDER — LORAZEPAM 0.5 MG/1
TABLET ORAL
Qty: 30 TABLET | Refills: 0 | Status: SHIPPED | OUTPATIENT
Start: 2022-05-27 | End: 2022-10-17

## 2022-05-27 NOTE — TELEPHONE ENCOUNTER
Patient reports nausea and abdominal discomfort when hungry. No fever, no diarrhea. Advised in clinic apt. (Last few appointments have been virtual).     Ok to use same day slot to be seen sooner than June 10th?     Fallon Spencer RN on 5/27/2022 at 3:22 PM

## 2022-05-29 ENCOUNTER — MYC MEDICAL ADVICE (OUTPATIENT)
Dept: FAMILY MEDICINE | Facility: OTHER | Age: 72
End: 2022-05-29
Payer: COMMERCIAL

## 2022-05-30 RX ORDER — SUCRALFATE 1 G/1
1 TABLET ORAL 4 TIMES DAILY
Qty: 40 TABLET | Refills: 1 | Status: SHIPPED | OUTPATIENT
Start: 2022-05-30 | End: 2022-06-10

## 2022-05-31 DIAGNOSIS — G89.4 CHRONIC PAIN SYNDROME: ICD-10-CM

## 2022-05-31 DIAGNOSIS — C50.412 MALIGNANT NEOPLASM OF UPPER-OUTER QUADRANT OF LEFT FEMALE BREAST, UNSPECIFIED ESTROGEN RECEPTOR STATUS (H): ICD-10-CM

## 2022-05-31 DIAGNOSIS — C67.9 UROTHELIAL CARCINOMA OF BLADDER WITH INVASION OF MUSCLE (H): ICD-10-CM

## 2022-05-31 DIAGNOSIS — Z51.11 ENCOUNTER FOR ANTINEOPLASTIC CHEMOTHERAPY: ICD-10-CM

## 2022-05-31 RX ORDER — OXYCODONE AND ACETAMINOPHEN 5; 325 MG/1; MG/1
TABLET ORAL
Qty: 60 TABLET | Refills: 0 | Status: SHIPPED | OUTPATIENT
Start: 2022-05-31 | End: 2022-07-28

## 2022-05-31 RX ORDER — PROCHLORPERAZINE MALEATE 10 MG
TABLET ORAL
Qty: 30 TABLET | Refills: 2 | Status: SHIPPED | OUTPATIENT
Start: 2022-05-31 | End: 2022-09-19

## 2022-05-31 NOTE — TELEPHONE ENCOUNTER
Pending Prescriptions:                       Disp   Refills    oxyCODONE-acetaminophen (PERCOCET) 5-325 M*60 tab*0        Sig: TAKE 1/2-1 TABLETS BY MOUTHEVERY 6 HOURS AS NEEDED           FORSEVERE PAIN    Routing refill request to provider for review/approval because:  Drug not on the [facility] refill protocol   oxyCODONE-acetaminophen (PERCOCET) 5-325 MG tablet 60 tablet 0 3/18/2022  No   Sig - Route: Take 0.5-1 tablets by mouth every 6 hours as needed for severe pain - Oral   Sent to pharmacy as: oxyCODONE-Acetaminophen 5-325 MG Oral Tablet (PERCOCET)   Class: E-Prescribe   Earliest Fill Date: 3/18/2022   Order: [id number]   E-Prescribing Status: Receipt confirmed by pharmacy (3/18/2022  3:19 PM CDT)       Requested Prescriptions   Pending Prescriptions Disp Refills    oxyCODONE-acetaminophen (PERCOCET) 5-325 MG tablet [Pharmacy Med Name: OXYCODONE/APAP 5MG-325MG TAB] 60 tablet 0     Sig: TAKE 1/2-1 TABLETS BY MOUTHEVERY 6 HOURS AS NEEDED FORSEVERE PAIN        There is no refill protocol information for this order

## 2022-06-04 ENCOUNTER — HEALTH MAINTENANCE LETTER (OUTPATIENT)
Age: 72
End: 2022-06-04

## 2022-06-10 ENCOUNTER — OFFICE VISIT (OUTPATIENT)
Dept: FAMILY MEDICINE | Facility: CLINIC | Age: 72
End: 2022-06-10
Payer: COMMERCIAL

## 2022-06-10 VITALS
RESPIRATION RATE: 10 BRPM | WEIGHT: 139 LBS | BODY MASS INDEX: 22.44 KG/M2 | OXYGEN SATURATION: 99 % | DIASTOLIC BLOOD PRESSURE: 60 MMHG | TEMPERATURE: 97.7 F | SYSTOLIC BLOOD PRESSURE: 102 MMHG | HEART RATE: 97 BPM

## 2022-06-10 DIAGNOSIS — R10.9 GASTRIC PAIN: Primary | ICD-10-CM

## 2022-06-10 DIAGNOSIS — C67.8 MALIGNANT NEOPLASM OF OVERLAPPING SITES OF BLADDER (H): ICD-10-CM

## 2022-06-10 PROCEDURE — 99214 OFFICE O/P EST MOD 30 MIN: CPT | Performed by: FAMILY MEDICINE

## 2022-06-10 RX ORDER — SUCRALFATE 1 G/1
1 TABLET ORAL 4 TIMES DAILY
Qty: 40 TABLET | Refills: 1 | Status: SHIPPED | OUTPATIENT
Start: 2022-06-10 | End: 2022-07-25

## 2022-06-10 ASSESSMENT — PATIENT HEALTH QUESTIONNAIRE - PHQ9
SUM OF ALL RESPONSES TO PHQ QUESTIONS 1-9: 8
SUM OF ALL RESPONSES TO PHQ QUESTIONS 1-9: 8
10. IF YOU CHECKED OFF ANY PROBLEMS, HOW DIFFICULT HAVE THESE PROBLEMS MADE IT FOR YOU TO DO YOUR WORK, TAKE CARE OF THINGS AT HOME, OR GET ALONG WITH OTHER PEOPLE: SOMEWHAT DIFFICULT

## 2022-06-10 NOTE — PROGRESS NOTES
Assessment & Plan     Gastric pain  Is been going on for a while.  See discussion below.  She has been taking omeprazole.  Carafate was added and this made a big difference.  I feel she would benefit from an EGD because of her previous cancer history involving  bladder and breast.  We will set this up.  Refilled her Carafate.  - sucralfate (CARAFATE) 1 GM tablet; Take 1 tablet (1 g) by mouth 4 times daily  - Adult Gastro Ref - Procedure Only; Future    Malignant neoplasm of overlapping sites of bladder (H)  This is being followed by oncology.  She has a ileal conduit stoma bag.                   No follow-ups on file.    Dylan Knowles MD  [facility]    Bishop Alcaraz is a 71 year old who presents for the following health issues: Situation as noted below.  She has not been seen for this yet but started on Carafate and it really makes a difference.  She finds it very challenging to take the Carafate because of the 4 times a day dosing with all of her other medications.    History of Present Illness       Reason for visit:  Stomach pain  Symptoms include:  Burning in my stomach  Symptom intensity:  Moderate  Symptom progression:  Improving  Had these symptoms before:  No  What makes it worse:  Mornings are worse and empty stomach and alcohol  What makes it better:  Carafate    She eats 0-1 servings of fruits and vegetables daily.She consumes 0 sweetened beverage(s) daily.She exercises with enough effort to increase her heart rate 30 to 60 minutes per day.  She exercises with enough effort to increase her heart rate 5 days per week.   She is taking medications regularly.    Today's PHQ-9         PHQ-9 Total Score: 8    PHQ-9 Q9 Thoughts of better off dead/self-harm past 2 weeks :   Not at all    How difficult have these problems made it for you to do your work, take care of things at home, or get along with other people: Somewhat difficult             Review of Systems    Constitutional, HEENT, cardiovascular, pulmonary, gi and gu systems are negative, except as otherwise noted.      Objective    /60   Pulse 97   Temp 97.7  F (36.5  C)   Resp 10   Wt 63 kg (139 lb)   SpO2 99%   BMI 22.44 kg/m    Body mass index is 22.44 kg/m .  Physical Exam   GENERAL: healthy, alert and no distress  EYES: Eyes grossly normal to inspection, PERRL and conjunctivae and sclerae normal  RESP: lungs clear to auscultation - no rales, rhonchi or wheezes  CV: regular rate and rhythm, normal S1 S2, no S3 or S4, no murmur, click or rub, no peripheral edema and peripheral pulses strong  ABDOMEN: bowel sounds normal and tenderness in the epigastric and left upper quadrant.  NEURO: Normal strength and tone, mentation intact and speech normal  PSYCH: mentation appears normal, affect normal/bright

## 2022-06-13 ENCOUNTER — TELEPHONE (OUTPATIENT)
Dept: GASTROENTEROLOGY | Facility: CLINIC | Age: 72
End: 2022-06-13

## 2022-06-13 ENCOUNTER — HOSPITAL ENCOUNTER (OUTPATIENT)
Facility: AMBULATORY SURGERY CENTER | Age: 72
End: 2022-06-13
Attending: INTERNAL MEDICINE
Payer: COMMERCIAL

## 2022-06-13 NOTE — TELEPHONE ENCOUNTER
Attempted to contact patient regarding upcoming EGD procedure on 6/20/22 for pre assessment questions. Patient is busy.     Left message with person who answered the phone for patient to return call to [phone] #[phone]    Discuss at home rapid antigen COVID test 1-2 days prior to procedure.    Arrival time: 1515    Facility location:      Sedation type: CS - patient is on naltrexone. Will need to hold 3 days prior. Has had CS in past and tolerated well.     Indication for procedure: gastric pain.     Anticoagulants: ASA 81mg.    Debbie Abebe RN

## 2022-06-13 NOTE — TELEPHONE ENCOUNTER
Pre assessment questions completed for upcoming EGD procedure scheduled on 6.20.2022    Discussed at home rapid antigen COVID test 1-2 days prior to procedure.    Reviewed procedural arrival time 1515 and facility location .    Designated  policy reviewed. Instructed to have someone stay 6 hours post procedure.     Anticoagulation/blood thinners? no    Electronic implanted devices? No    Pt will hold naltrexone for 3 days prior to procedure.    Reviewed EGD prep instructions with patient.     Patient verbalized understanding and had no questions or concerns at this time.    Marquita Garsia RN

## 2022-06-13 NOTE — TELEPHONE ENCOUNTER
Caller: Michaela Dorman      Procedure: EGD    Date, Location, and Surgeon of Procedure Cancelled: 7/13, Brodie OWENS    Ordering Provider:Dylan Knowles MD    Reason for cancel (please be detailed, any staff messages or encounters to note?): pt called and requested for sooner appt, okay with any location/provider        Rescheduled: y     If rescheduled:    Date: 6/20   Location:    Prep Resent: y(changes to prep?)   Covid Test Rescheduled: home test   Note any change or update to original order/sedation: n/a

## 2022-06-13 NOTE — TELEPHONE ENCOUNTER
Screening Questions  BlueKIND OF PREP RedLOCATION [review exclusion criteria] GreenSEDATION TYPE      1. Are you able to give consent for your medical care? Do you have a legal guardian or medical Power of ?  Y (Sedation review/consideration needed)    2. Have you had a positive covid test in the last 90 days? N  a. If yes, what date?N    3. Are you active on mychart? Y    4. What insurance is in the chart? [employer]      3.   Ordering/Referring Provider: BENI     4. BMI 22.4 [BMI OVER 40-EXTENDED PREP]  If greater than 40 review exclusion criteria [PAC APPT IF @ [facility]]        5.  Respiratory Screening :  [If yes to any of the following HOSPITAL setting only]     Do you use daily home oxygen? N    Do you have mod to severe Obstructive Sleep Apnea? N  [OKAY @ [facility] SH PH RI]   Do you have Pulmonary Hypertension? N     Do you have UNCONTROLLED asthma? N        6.   Have you had a heart or lung transplant? N      7.   Are you currently on dialysis? N [ If yes, G-PREP & HOSPITAL setting only]     8.   Do you have chronic kidney disease? N [ If yes, G-PREP ]    9.   Have you had a stroke or Transient ischemic attack (TIA - aka  mini stroke ) within 6 months?  N (If yes, please review exclusion criteria)    10.   In the past 6 months, have you had any heart related issues including cardiomyopathy or heart attack? N           If yes, did it require cardiac stenting or other implantable device? N      11.   Do you have any implantable devices in your body (pacemaker, defib, LVAD)? N JUST A PORT  (If yes, please review exclusion criteria)    12.   Do you take nitroglycerin? N           If yes, how often? N  (if yes, HOSPITAL setting ONLY)    13.   Are you currently taking any blood thinners? N           [IF YES, INFORM PATIENT TO FOLLOW UP W/ ORDERING PROVIDER FOR BRIDGING INSTRUCTIONS]     14.   Do you have a diagnosis of diabetes? N   [ If yes, G-PREP ]    15.   [FEMALES] Are you currently  pregnant? N    If yes, how many weeks? N    16.   Are you taking any prescription pain medications on a routine schedule?  N  [ If yes, EXTENDED PREP.] [If yes, MAC]    17.   Do you have any chemical dependencies such as alcohol, street drugs, or methadone?  N [If yes, MAC]    18.   Do you have any history of post-traumatic stress syndrome, severe anxiety or history of psychosis?  N  [If yes, MAC]    19.   Do you transfer independently?  Y    20.  On a regular basis do you go 3-5 days between bowel movements? NA   [ If yes, EXTENDED PREP.]    21.   Preferred LOCAL Pharmacy for Pre Prescription      [facility] - [facility] [place], [place] - [address] [facility] - [address] [facility] - [address]  [facility] - [place], [place] - [address]       Scheduling Details      Caller : GRAY   (Please ask for phone number if not scheduled by patient)    Type of Procedure Scheduled: EGD   Which Colonoscopy Prep was Sent?: NA  JUAN CF PATIENTS & GROEN'S PATIENTS REQUIRE EXTENDED PREP  Surgeon: KIRSTEN  Date of Procedure: 7/15  Location: [facility]      Sedation Type: CS  Conscious Sedation- Needs  for 6 hours after the procedure  MAC/General-Needs  for 24 hours after procedure    Pre-op Required at [facility], [place], [place] and OR for MAC sedation: N  (advise patient they will need a pre-op prior to procedure -)      Informed patient they will need an adult  Y  Cannot take any type of public or medical transportation alone    Pre-Procedure Covid test to be completed at [facility] or Externally: Y    Confirmed Nurse will call to complete assessment Y    Additional comments: N

## 2022-06-20 ENCOUNTER — HOSPITAL ENCOUNTER (OUTPATIENT)
Facility: CLINIC | Age: 72
Discharge: HOME | End: 2022-06-20
Attending: INTERNAL MEDICINE | Admitting: INTERNAL MEDICINE
Payer: MEDICARE

## 2022-06-20 VITALS
HEIGHT: 66 IN | HEART RATE: 82 BPM | WEIGHT: 138 LBS | SYSTOLIC BLOOD PRESSURE: 142 MMHG | RESPIRATION RATE: 16 BRPM | OXYGEN SATURATION: 96 % | BODY MASS INDEX: 22.18 KG/M2 | DIASTOLIC BLOOD PRESSURE: 90 MMHG

## 2022-06-20 LAB — UPPER GI ENDOSCOPY: NORMAL

## 2022-06-20 PROCEDURE — 88305 TISSUE EXAM BY PATHOLOGIST: CPT | Mod: TC | Performed by: INTERNAL MEDICINE

## 2022-06-20 PROCEDURE — 250N000011 HC RX IP 250 OP 636: Performed by: INTERNAL MEDICINE

## 2022-06-20 PROCEDURE — 999N000099 HC STATISTIC MODERATE SEDATION < 10 MIN: Performed by: INTERNAL MEDICINE

## 2022-06-20 PROCEDURE — 43239 EGD BIOPSY SINGLE/MULTIPLE: CPT | Performed by: INTERNAL MEDICINE

## 2022-06-20 RX ORDER — FENTANYL CITRATE 50 UG/ML
INJECTION, SOLUTION INTRAMUSCULAR; INTRAVENOUS PRN
Status: COMPLETED | OUTPATIENT
Start: 2022-06-20 | End: 2022-06-20

## 2022-06-20 RX ADMIN — MIDAZOLAM 2 MG: 1 INJECTION INTRAMUSCULAR; INTRAVENOUS at 16:32

## 2022-06-20 RX ADMIN — MIDAZOLAM 2 MG: 1 INJECTION INTRAMUSCULAR; INTRAVENOUS at 16:31

## 2022-06-20 RX ADMIN — FENTANYL CITRATE 100 MCG: 50 INJECTION, SOLUTION INTRAMUSCULAR; INTRAVENOUS at 16:31

## 2022-06-22 ENCOUNTER — TELEPHONE (OUTPATIENT)
Dept: FAMILY MEDICINE | Facility: OTHER | Age: 72
End: 2022-06-22

## 2022-06-22 DIAGNOSIS — R10.9 GASTRIC PAIN: ICD-10-CM

## 2022-06-22 DIAGNOSIS — F43.21 ADJUSTMENT DISORDER WITH DEPRESSED MOOD: ICD-10-CM

## 2022-06-22 DIAGNOSIS — F41.9 ANXIETY: ICD-10-CM

## 2022-06-22 LAB
PATH REPORT.COMMENTS IMP SPEC: NORMAL
PATH REPORT.COMMENTS IMP SPEC: NORMAL
PATH REPORT.FINAL DX SPEC: NORMAL
PATH REPORT.GROSS SPEC: NORMAL
PATH REPORT.MICROSCOPIC SPEC OTHER STN: NORMAL
PATH REPORT.RELEVANT HX SPEC: NORMAL
PHOTO IMAGE: NORMAL

## 2022-06-22 PROCEDURE — 88305 TISSUE EXAM BY PATHOLOGIST: CPT | Mod: 26 | Performed by: PATHOLOGY

## 2022-06-23 ENCOUNTER — NURSE TRIAGE (OUTPATIENT)
Dept: FAMILY MEDICINE | Facility: OTHER | Age: 72
End: 2022-06-23

## 2022-06-23 ENCOUNTER — MYC MEDICAL ADVICE (OUTPATIENT)
Dept: FAMILY MEDICINE | Facility: CLINIC | Age: 72
End: 2022-06-23

## 2022-06-23 ENCOUNTER — MYC MEDICAL ADVICE (OUTPATIENT)
Dept: FAMILY MEDICINE | Facility: OTHER | Age: 72
End: 2022-06-23

## 2022-06-23 RX ORDER — SUCRALFATE 1 G/1
1 TABLET ORAL 4 TIMES DAILY
Qty: 40 TABLET | Refills: 1 | OUTPATIENT
Start: 2022-06-23

## 2022-06-23 RX ORDER — ESCITALOPRAM OXALATE 20 MG/1
TABLET ORAL
Qty: 90 TABLET | Refills: 0 | OUTPATIENT
Start: 2022-06-23

## 2022-06-23 NOTE — TELEPHONE ENCOUNTER
Routing to provider- Can you see her in your approval required slot @ 2:40pm today?     Nurse Triage SBAR  Is this a 2nd Level Triage? YES, LICENSED PRACTITIONER REVIEW IS REQUIRED    SITUATION:                                                      Michaela Dorman is a 71 year old female with dizziness and fatigue     BACKGROUND:                                                      Current Medication related to illness: No New medications, took all medications this morning    Hx: RN reached out to patient to follow up on her [employer] message requesting lab orders.    Patient reports that since yesterday she has had dizziness with standing up. Worse in the morning, subsides by afternoon. Dizziness last a few seconds. BP was 93/53 and HR 94 (normally runs 112/70's). No s/s of dehydration, drinking fluids well. No headache, vision changes or neuro deficits reported.     Also reports feeling weak and fatigues easily. SOB with activity. Hx of anemia- feels perhaps her Hgb is really low.   Has ongoing stomach pains unchanged, consistent with when she saw Dr. Knowles.     NURSE ASSESSMENT:                                                      Office Visit today for evaluation and labs    (See information below for more triage details.)  RECOMMENDATION(S) and PLAN:                                                      Protocol Recommended Disposition: To office now- Routing to provider to see if she can be seen today in primary care.     Will comply with recommendation: yes    Encourage to return call clinic triage nurse if further questions/concerns that may come up or if symptoms do not improve, worsen, or new symptoms develop.    NOTES: Disposition was determined by the first positive assessment question, therefore all previous assessment questions were negative.  Guideline used: Dizziness-A-OH    Fallon Spencer RN on 6/23/2022 at 12:12 PM    Reason for Disposition    Lightheadedness (dizziness) present now, after 2  hours of rest and fluids    Additional Information    Negative: Shock suspected (e.g., cold/pale/clammy skin, too weak to stand, low BP, rapid pulse)    Negative: Difficult to awaken or acting confused (e.g., disoriented, slurred speech)    Negative: Fainted, and still feels dizzy afterwards    Negative: Severe difficulty breathing (e.g., struggling for each breath, speaks in single words)    Negative: Overdose (accidental or intentional) of medications    Negative: New neurologic deficit that is present now: * Weakness of the face, arm, or leg on one side of the body * Numbness of the face, arm, or leg on one side of the body * Loss of speech or garbled speech    Negative: Heart beating < 50 beats per minute OR > 140 beats per minute    Negative: Sounds like a life-threatening emergency to the triager    Negative: Chest pain    Negative: Rectal bleeding, bloody stool, or tarry-black stool    Negative: Vomiting is the main symptom    Negative: Diarrhea is the main symptom    Negative: Headache is the main symptom    Negative: Heat exhaustion suspected (i.e., dehydration from heat exposure)    Negative: Patient states that he/she is having an anxiety/panic attack    Negative: SEVERE dizziness (e.g., unable to stand, requires support to walk, feels like passing out now)    Negative: SEVERE headache or neck pain    Negative: Spinning or tilting sensation (vertigo) present now and one or more stroke risk factors (i.e., hypertension, diabetes, prior stroke/TIA, heart attack, age over 60) (Exception: prior physician evaluation for this AND no different/worse than usual)    Negative: Loss of vision or double vision    Negative: Extra heart beats OR irregular heart beating (i.e., 'palpitations')    Negative: Difficulty breathing    Negative: Drinking very little and has signs of dehydration (e.g., no urine > 12 hours, very dry mouth, very lightheaded)    Negative: Follows bleeding (e.g., stomach, rectum, vagina) (Exception:  became dizzy from sight of small amount blood)    Negative: Patient sounds very sick or weak to the triager    Negative: Vomiting occurs with dizziness    Negative: Patient wants to be seen    Protocols used: DIZZINESS-A-OH

## 2022-06-23 NOTE — TELEPHONE ENCOUNTER
"Pending Prescriptions:                       Disp   Refills    escitalopram (LEXAPRO) 20 MG tablet [Pharm*90 tab*0        Sig: TAKE 1 TABLET BY MOUTH EVERY DAY WITH 10MG TABLETS    Routing refill request to provider for review/approval because:  Drug not active on patient's medication list    Requested Prescriptions   Pending Prescriptions Disp Refills    escitalopram (LEXAPRO) 20 MG tablet [Pharmacy Med Name: ESCITALOPRAM 20MG TABLET] 90 tablet 0     Sig: TAKE 1 TABLET BY MOUTH EVERY DAY WITH 10MG TABLETS        SSRIs Protocol Failed - 6/22/2022  6:09 PM        Failed - PHQ-9 score less than 5 in past 6 months     Please review last PHQ-9 score.           Failed - Medication is active on med list        Passed - Patient is age 18 or older        Passed - No active pregnancy on record        Passed - No positive pregnancy test in last 12 months        Passed - Recent (6 mo) or future (30 days) visit within the authorizing provider's specialty     Patient had office visit in the last 6 months or has a visit in the next 30 days with authorizing provider or within the authorizing provider's specialty.  See \"Patient Info\" tab in inbasket, or \"Choose Columns\" in Meds & Orders section of the refill encounter.                  "

## 2022-06-23 NOTE — TELEPHONE ENCOUNTER
JDH patient do not see medication on list and that it has been prescribed by our FP. I recommend getting more information from patient and having her follow up for medication.       LISA Freeman CNP  Questions or concerns please feel free to send me a [employer] message or call me  Phone : [phone]

## 2022-06-23 NOTE — TELEPHONE ENCOUNTER
"Patient reports that her BP this morning before taking her carvedilol was 93/63.  Patient's BP is typically \"pretty low\" every morning.  She mainly feels dizzy/weak when she gets up in the morning.  She does change positions slowing from laying to sitting and sitting to standing.    She is taking carvedilol 6.25mg once a day after eating breakfast around 9am.   It is prescribed to be taken BID but she forgets her HS dose.    Patient just took her BP and it was 121/69 and P 81.  Patient is not currently dizzy.  She is taking in roughly 60oz of water each day.    1. Do you want to see patient for further follow up?  2. Ok for patient to continue taking carvedilol 6.25mg once daily?  3. Any home care advise for dizziness/hypotension in the morning?  "

## 2022-06-23 NOTE — TELEPHONE ENCOUNTER
See telephone encounter for triage. Routed 2nd Level triage to [place] team to see if they can see her today.     Fallon Spencer RN on 6/23/2022 at 12:15 PM

## 2022-06-24 NOTE — TELEPHONE ENCOUNTER
Per Dr. Knowles, She should set up a face-to-face visit with me to discuss this and her stomach again.     I have attempted to call the pt with the following message. I left a message for pt to call back. I will call back another time. Shandra Navarro, [facility]

## 2022-06-24 NOTE — TELEPHONE ENCOUNTER
Spoke with patient.  She is taking both the lexapro 20mg and the bupropion.  States was told could do so.    Per the 5/2922 [employer] message Phani Tapia PA-C had told her could take both the lexapro and the bupropion.  She has a weeks worth of lexapro 20mg left.  Will need refill next week.    Routing to her provider for refill needs by late next week.  Mercedes Mancia RN

## 2022-06-24 NOTE — TELEPHONE ENCOUNTER
I have replied to pt's [employer] message with Dr. Knowles's message from other encounter. Shandra Navarro, CMA

## 2022-06-24 NOTE — TELEPHONE ENCOUNTER
Left message on answering machine for patient to call back RN team; needing information on a medication dose.  [phone].  [employer] message also sent. Mercedes KMandy RN

## 2022-06-27 RX ORDER — ESCITALOPRAM OXALATE 20 MG/1
20 TABLET ORAL DAILY
Qty: 90 TABLET | Refills: 3 | Status: SHIPPED | OUTPATIENT
Start: 2022-06-27 | End: 2022-12-09

## 2022-06-30 DIAGNOSIS — E03.4 HYPOTHYROIDISM DUE TO ACQUIRED ATROPHY OF THYROID: ICD-10-CM

## 2022-07-01 ENCOUNTER — VIRTUAL VISIT (OUTPATIENT)
Dept: FAMILY MEDICINE | Facility: OTHER | Age: 72
End: 2022-07-01
Payer: COMMERCIAL

## 2022-07-01 ENCOUNTER — LAB (OUTPATIENT)
Dept: LAB | Facility: CLINIC | Age: 72
End: 2022-07-01
Payer: COMMERCIAL

## 2022-07-01 DIAGNOSIS — R53.83 MALAISE AND FATIGUE: ICD-10-CM

## 2022-07-01 DIAGNOSIS — M79.10 MYALGIA: ICD-10-CM

## 2022-07-01 DIAGNOSIS — R53.81 MALAISE AND FATIGUE: ICD-10-CM

## 2022-07-01 DIAGNOSIS — R53.83 MALAISE AND FATIGUE: Primary | ICD-10-CM

## 2022-07-01 DIAGNOSIS — R53.81 MALAISE AND FATIGUE: Primary | ICD-10-CM

## 2022-07-01 LAB
CRP SERPL-MCNC: 3.5 MG/L (ref 0–8)
LACTATE SERPL-SCNC: 0.8 MMOL/L (ref 0.7–2)

## 2022-07-01 PROCEDURE — 99214 OFFICE O/P EST MOD 30 MIN: CPT | Mod: 95 | Performed by: PHYSICIAN ASSISTANT

## 2022-07-01 PROCEDURE — 83605 ASSAY OF LACTIC ACID: CPT

## 2022-07-01 PROCEDURE — 99000 SPECIMEN HANDLING OFFICE-LAB: CPT

## 2022-07-01 PROCEDURE — 86757 RICKETTSIA ANTIBODY: CPT | Mod: 90

## 2022-07-01 PROCEDURE — 86140 C-REACTIVE PROTEIN: CPT

## 2022-07-01 PROCEDURE — 36415 COLL VENOUS BLD VENIPUNCTURE: CPT

## 2022-07-01 PROCEDURE — 86618 LYME DISEASE ANTIBODY: CPT

## 2022-07-01 PROCEDURE — 86666 EHRLICHIA ANTIBODY: CPT | Mod: 90

## 2022-07-01 RX ORDER — LEVOTHYROXINE SODIUM 25 UG/1
25 TABLET ORAL DAILY
Qty: 90 TABLET | Refills: 0 | Status: SHIPPED | OUTPATIENT
Start: 2022-07-01 | End: 2023-01-09

## 2022-07-01 NOTE — PROGRESS NOTES
Lissa is a 71 year old who is being evaluated via a billable telephone visit.      What phone number would you like to be contacted at? [phone]  How would you like to obtain your AVS? MyChart    Assessment & Plan     Malaise and fatigue  Myalgia  Patient's main complaint today is for global muscle aches and pains.  She has labs that are in place from her listed PCP.  Do recommend that she get those done.  I Cassidy add in Lyme disease and anaplasmosis as well as Rickettsia and a sedimentation rate as well as lactic acid level to evaluate this further.  Advised that we not change her depression medications until we know if we can help her with muscle aches and pains.  She agrees with this.  She denies any need for further refills at this point time.  Follow-up with me face-to-face if the labs do not show anything of consequence.     No follow-ups on file.    Phani Jason PA-C  [facility]   Lissa is a 71 year old, presenting for the following health issues:  No chief complaint on file.      HPI     Depression and Anxiety Follow-Up    How are you doing with your depression since your last visit? No change    How are you doing with your anxiety since your last visit?  No change    Are you having other symptoms that might be associated with depression or anxiety? No    Have you had a significant life event? No     Do you have any concerns with your use of alcohol or other drugs? No    Social History     Tobacco Use     Smoking status: Former Smoker     Packs/day: 3.00     Years: 10.00     Pack years: 30.00     Types: Cigarettes     Quit date: 1979     Years since quittin.6     Smokeless tobacco: Never Used     Tobacco comment: approx. 3 packs daily   Vaping Use     Vaping Use: Never used   Substance Use Topics     Alcohol use: Yes     Comment: 1-2 daily glass of wine     Drug use: No     PHQ 3/8/2021 2022 6/10/2022   PHQ-9 Total Score 8 15 8   Q9: Thoughts of  better off dead/self-harm past 2 weeks Not at all Not at all Not at all     AUSTIN-7 SCORE 6/16/2020 3/8/2021 2/25/2022   Total Score - 18 (severe anxiety) -   Total Score 14 18 14     Last PHQ-9 6/10/2022   1.  Little interest or pleasure in doing things 1   2.  Feeling down, depressed, or hopeless 1   3.  Trouble falling or staying asleep, or sleeping too much 1   4.  Feeling tired or having little energy 3   5.  Poor appetite or overeating 0   6.  Feeling bad about yourself 1   7.  Trouble concentrating 1   8.  Moving slowly or restless 0   Q9: Thoughts of better off dead/self-harm past 2 weeks 0   PHQ-9 Total Score 8   Difficulty at work, home, or with people -     AUSTIN-7  2/25/2022   1. Feeling nervous, anxious, or on edge 2   2. Not being able to stop or control worrying 3   3. Worrying too much about different things 3   4. Trouble relaxing 0   5. Being so restless that it is hard to sit still 0   6. Becoming easily annoyed or irritable 3   7. Feeling afraid, as if something awful might happen 3   AUSTIN-7 Total Score 14   If you checked any problems, how difficult have they made it for you to do your work, take care of things at home, or get along with other people? Somewhat difficult       Suicide Assessment Five-step Evaluation and Treatment (SAFE-T)      How many servings of fruits and vegetables do you eat daily?  0-1    On average, how many sweetened beverages do you drink each day (Examples: soda, juice, sweet tea, etc.  Do NOT count diet or artificially sweetened beverages)?   0    How many days per week do you exercise enough to make your heart beat faster? 3 or less    How many minutes a day do you exercise enough to make your heart beat faster? 9 or less    How many days per week do you miss taking your medication? 0    Fatigue    Review of Systems   Constitutional, HEENT, cardiovascular, pulmonary, GI, , musculoskeletal, neuro, skin, endocrine and psych systems are negative, except as otherwise  noted.      Objective           Vitals:  No vitals were obtained today due to virtual visit.    Physical Exam   healthy, alert and no distress  PSYCH: Alert and oriented times 3; coherent speech, normal   rate and volume, able to articulate logical thoughts, able   to abstract reason, no tangential thoughts, no hallucinations   or delusions  Her affect is normal  RESP: No cough, no audible wheezing, able to talk in full sentences  Remainder of exam unable to be completed due to telephone visits            Phone call duration: 8 minutes    .  ..

## 2022-07-04 LAB
R RICKETTSI IGG TITR SER IF: NORMAL {TITER}
R RICKETTSI IGM TITR SER IF: NORMAL {TITER}

## 2022-07-05 LAB — B BURGDOR IGG+IGM SER QL: 0.03

## 2022-07-06 LAB
A PHAGOCYTOPH IGG TITR SER IF: NORMAL {TITER}
A PHAGOCYTOPH IGM TITR SER IF: NORMAL {TITER}

## 2022-07-08 DIAGNOSIS — N18.31 STAGE 3A CHRONIC KIDNEY DISEASE (H): Primary | ICD-10-CM

## 2022-07-11 ENCOUNTER — LAB (OUTPATIENT)
Dept: LAB | Facility: CLINIC | Age: 72
End: 2022-07-11
Payer: COMMERCIAL

## 2022-07-11 DIAGNOSIS — N18.31 STAGE 3A CHRONIC KIDNEY DISEASE (H): ICD-10-CM

## 2022-07-11 DIAGNOSIS — D64.9 NORMOCYTIC ANEMIA: ICD-10-CM

## 2022-07-11 DIAGNOSIS — C67.8 MALIGNANT NEOPLASM OF OVERLAPPING SITES OF BLADDER (H): ICD-10-CM

## 2022-07-11 LAB
ALBUMIN SERPL-MCNC: 3.9 G/DL (ref 3.4–5)
ALP SERPL-CCNC: 46 U/L (ref 40–150)
ALT SERPL W P-5'-P-CCNC: 21 U/L (ref 0–50)
ANION GAP SERPL CALCULATED.3IONS-SCNC: 5 MMOL/L (ref 3–14)
AST SERPL W P-5'-P-CCNC: 16 U/L (ref 0–45)
BASOPHILS # BLD AUTO: 0.1 10E3/UL (ref 0–0.2)
BASOPHILS NFR BLD AUTO: 1 %
BILIRUB SERPL-MCNC: 0.2 MG/DL (ref 0.2–1.3)
BUN SERPL-MCNC: 23 MG/DL (ref 7–30)
CALCIUM SERPL-MCNC: 9.3 MG/DL (ref 8.5–10.1)
CHLORIDE BLD-SCNC: 109 MMOL/L (ref 94–109)
CO2 SERPL-SCNC: 25 MMOL/L (ref 20–32)
CREAT SERPL-MCNC: 0.99 MG/DL (ref 0.52–1.04)
CREAT UR-MCNC: 34 MG/DL
EOSINOPHIL # BLD AUTO: 0.3 10E3/UL (ref 0–0.7)
EOSINOPHIL NFR BLD AUTO: 4 %
ERYTHROCYTE [DISTWIDTH] IN BLOOD BY AUTOMATED COUNT: 13.5 % (ref 10–15)
GFR SERPL CREATININE-BSD FRML MDRD: 60 ML/MIN/1.73M2
GLUCOSE BLD-MCNC: 76 MG/DL (ref 70–99)
HCT VFR BLD AUTO: 34.7 % (ref 35–47)
HGB BLD-MCNC: 11.7 G/DL (ref 11.7–15.7)
IMM GRANULOCYTES # BLD: 0 10E3/UL
IMM GRANULOCYTES NFR BLD: 0 %
LYMPHOCYTES # BLD AUTO: 2.6 10E3/UL (ref 0.8–5.3)
LYMPHOCYTES NFR BLD AUTO: 29 %
MCH RBC QN AUTO: 33.3 PG (ref 26.5–33)
MCHC RBC AUTO-ENTMCNC: 33.7 G/DL (ref 31.5–36.5)
MCV RBC AUTO: 99 FL (ref 78–100)
MONOCYTES # BLD AUTO: 0.7 10E3/UL (ref 0–1.3)
MONOCYTES NFR BLD AUTO: 8 %
NEUTROPHILS # BLD AUTO: 5.3 10E3/UL (ref 1.6–8.3)
NEUTROPHILS NFR BLD AUTO: 58 %
NRBC # BLD AUTO: 0 10E3/UL
NRBC BLD AUTO-RTO: 0 /100
PHOSPHATE SERPL-MCNC: 2.7 MG/DL (ref 2.5–4.5)
PLATELET # BLD AUTO: 274 10E3/UL (ref 150–450)
POTASSIUM BLD-SCNC: 3.9 MMOL/L (ref 3.4–5.3)
PROT SERPL-MCNC: 7.5 G/DL (ref 6.8–8.8)
PROT UR-MCNC: 0.1 G/L
PROT/CREAT 24H UR: 0.29 G/G CR (ref 0–0.2)
PTH-INTACT SERPL-MCNC: 61 PG/ML (ref 15–65)
RBC # BLD AUTO: 3.51 10E6/UL (ref 3.8–5.2)
SODIUM SERPL-SCNC: 139 MMOL/L (ref 133–144)
TSH SERPL DL<=0.005 MIU/L-ACNC: 2.46 MU/L (ref 0.4–4)
WBC # BLD AUTO: 9 10E3/UL (ref 4–11)

## 2022-07-11 PROCEDURE — 84100 ASSAY OF PHOSPHORUS: CPT

## 2022-07-11 PROCEDURE — 83970 ASSAY OF PARATHORMONE: CPT

## 2022-07-11 PROCEDURE — 84156 ASSAY OF PROTEIN URINE: CPT

## 2022-07-11 PROCEDURE — 36415 COLL VENOUS BLD VENIPUNCTURE: CPT

## 2022-07-11 PROCEDURE — 85027 COMPLETE CBC AUTOMATED: CPT | Performed by: INTERNAL MEDICINE

## 2022-07-11 PROCEDURE — 80053 COMPREHEN METABOLIC PANEL: CPT | Performed by: INTERNAL MEDICINE

## 2022-07-11 PROCEDURE — 84443 ASSAY THYROID STIM HORMONE: CPT | Performed by: INTERNAL MEDICINE

## 2022-07-12 ENCOUNTER — MYC MEDICAL ADVICE (OUTPATIENT)
Dept: FAMILY MEDICINE | Facility: OTHER | Age: 72
End: 2022-07-12

## 2022-07-14 ENCOUNTER — MYC MEDICAL ADVICE (OUTPATIENT)
Dept: FAMILY MEDICINE | Facility: CLINIC | Age: 72
End: 2022-07-14

## 2022-07-14 DIAGNOSIS — R53.83 OTHER FATIGUE: Primary | ICD-10-CM

## 2022-07-14 NOTE — TELEPHONE ENCOUNTER
Let her know I put in an order for future blood work for a B12 level as this needs to be measured before any B12 injections are given if needed.

## 2022-07-15 ENCOUNTER — MYC MEDICAL ADVICE (OUTPATIENT)
Dept: FAMILY MEDICINE | Facility: CLINIC | Age: 72
End: 2022-07-15

## 2022-07-15 ENCOUNTER — LAB (OUTPATIENT)
Dept: LAB | Facility: CLINIC | Age: 72
End: 2022-07-15
Payer: COMMERCIAL

## 2022-07-15 DIAGNOSIS — R53.83 OTHER FATIGUE: ICD-10-CM

## 2022-07-15 LAB — VIT B12 SERPL-MCNC: 712 PG/ML (ref 232–1245)

## 2022-07-15 PROCEDURE — 36415 COLL VENOUS BLD VENIPUNCTURE: CPT

## 2022-07-15 PROCEDURE — 82607 VITAMIN B-12: CPT

## 2022-07-20 ENCOUNTER — MYC MEDICAL ADVICE (OUTPATIENT)
Dept: FAMILY MEDICINE | Facility: OTHER | Age: 72
End: 2022-07-20

## 2022-07-20 DIAGNOSIS — C50.412 MALIGNANT NEOPLASM OF UPPER-OUTER QUADRANT OF LEFT BREAST IN FEMALE, ESTROGEN RECEPTOR NEGATIVE (H): ICD-10-CM

## 2022-07-20 DIAGNOSIS — R53.83 MALAISE AND FATIGUE: Primary | ICD-10-CM

## 2022-07-20 DIAGNOSIS — Z17.1 MALIGNANT NEOPLASM OF UPPER-OUTER QUADRANT OF LEFT BREAST IN FEMALE, ESTROGEN RECEPTOR NEGATIVE (H): ICD-10-CM

## 2022-07-20 DIAGNOSIS — I10 HYPERTENSION GOAL BP (BLOOD PRESSURE) < 140/90: ICD-10-CM

## 2022-07-20 DIAGNOSIS — T45.1X5A CHEMOTHERAPY-INDUCED NEUTROPENIA (H): ICD-10-CM

## 2022-07-20 DIAGNOSIS — D70.1 CHEMOTHERAPY-INDUCED NEUTROPENIA (H): ICD-10-CM

## 2022-07-20 DIAGNOSIS — D69.6 THROMBOCYTOPENIA (H): ICD-10-CM

## 2022-07-20 DIAGNOSIS — R53.81 MALAISE AND FATIGUE: Primary | ICD-10-CM

## 2022-07-20 DIAGNOSIS — Z85.3 PERSONAL HISTORY OF MALIGNANT NEOPLASM OF BREAST: ICD-10-CM

## 2022-07-20 DIAGNOSIS — I65.23 CAROTID STENOSIS, BILATERAL: ICD-10-CM

## 2022-07-20 DIAGNOSIS — C67.8 MALIGNANT NEOPLASM OF OVERLAPPING SITES OF BLADDER (H): ICD-10-CM

## 2022-07-21 ENCOUNTER — MYC MEDICAL ADVICE (OUTPATIENT)
Dept: ONCOLOGY | Facility: CLINIC | Age: 72
End: 2022-07-21

## 2022-07-21 NOTE — TELEPHONE ENCOUNTER
Good afternoon,     I am sorry to hear about your symptoms. Would you like to try to see an internal med provider?       Take Care,     Merrick Hutchinson RN

## 2022-07-22 DIAGNOSIS — R10.9 GASTRIC PAIN: ICD-10-CM

## 2022-07-22 NOTE — TELEPHONE ENCOUNTER
Mychart sent to patient to reach out to PCP. See encounter from 7/21/22-7/22/22.  Noemi Garcia RNCC

## 2022-07-25 RX ORDER — SUCRALFATE 1 G/1
1 TABLET ORAL 4 TIMES DAILY
Qty: 40 TABLET | Refills: 1 | Status: SHIPPED | OUTPATIENT
Start: 2022-07-25 | End: 2022-09-16

## 2022-07-25 NOTE — TELEPHONE ENCOUNTER
Prescription approved per [facility] Refill Protocol.    Eligio Heller,BSN, RN

## 2022-07-28 DIAGNOSIS — G89.4 CHRONIC PAIN SYNDROME: ICD-10-CM

## 2022-07-28 RX ORDER — OXYCODONE AND ACETAMINOPHEN 5; 325 MG/1; MG/1
TABLET ORAL
Qty: 60 TABLET | Refills: 0 | Status: SHIPPED | OUTPATIENT
Start: 2022-07-28 | End: 2022-09-15

## 2022-07-28 NOTE — TELEPHONE ENCOUNTER
Pending Prescriptions:                       Disp   Refills    oxyCODONE-acetaminophen (PERCOCET) 5-325 M*60 tab*0        Sig: TAKE 1/2-1 TABLETS BY MOUTH EVERY 6 HOURS AS NEEDED           FOR SEVERE PAIN        Routing refill request to provider for review/approval because:  Drug not on the G refill protocol   MARY GRACE EscamillaN, RN, PHN  [place]  July 28, 2022

## 2022-08-02 ENCOUNTER — VIRTUAL VISIT (OUTPATIENT)
Dept: NEPHROLOGY | Facility: CLINIC | Age: 72
End: 2022-08-02
Payer: COMMERCIAL

## 2022-08-02 VITALS — SYSTOLIC BLOOD PRESSURE: 135 MMHG | DIASTOLIC BLOOD PRESSURE: 71 MMHG

## 2022-08-02 DIAGNOSIS — C67.9 UROTHELIAL CARCINOMA OF BLADDER WITH INVASION OF MUSCLE (H): ICD-10-CM

## 2022-08-02 DIAGNOSIS — N18.31 STAGE 3A CHRONIC KIDNEY DISEASE (H): Primary | ICD-10-CM

## 2022-08-02 PROCEDURE — 99214 OFFICE O/P EST MOD 30 MIN: CPT | Mod: 95 | Performed by: INTERNAL MEDICINE

## 2022-08-02 NOTE — PROGRESS NOTES
Lissa is a 71 year old who is being evaluated via a billable video visit.      How would you like to obtain your AVS? MyChart  If the video visit is dropped, the invitation should be resent by: Text to cell phone: [phone]  Will anyone else be joining your video visit? No        08/12/2022    CC: elevated creatinine    HPI: Michaela Dorman is a 71 year old  female who presents for evaluation of elevated creatinine.  Jan 5, 2021:  Ms. Jean has had difficulties with recurrent UTIs- on/off for years.  - referred to urology as well - started on bactrim single strength for prophylaxis in late November. Plan in November was to follow-up with Dr. Shaw in urology again and at that time will undergo cystoscopy as well. On review of her creatinine levels, her true baseline seems to be around 0.8 but with episodes of elevation (up to 1.2 in 2012, 1.45 in May 2019, more recently sitting 1.1-1.2 this year). On review of UAs, she has had hematuria; UMaCR typically normal but 40 mg/g in Dec 2020.    Today she reports that she has stopped the bactrim due to ongoing infections despite being on it; off for one week. She is now on ciprofloxacin; also recent difficulties with yeast infections. BP most recently has been low. She feels weak/dizzy with these low pressures - by afternoon is able to walk 3 miles. She is off of hydrochlorothiazide now; 162/75 yesterday later in the day so did take a tab. She is eating/drinking well. No fever/chills. She feels the UTI is gone at this time. No diarrhea. No irregular heart rate appreciated. She reports low blood pressures for years on/off - now 3-4 months with very low pressures.    She is taking celebrex for back pain - had an extensive back surgery in early 2020. Currently taking celebrex once a day. Prior to 2020 was not taking NSAIDs. On prevacid for heart burn - has been on this for long time - no heart burn sxs for a long time. No hx of ulcers/esophagitis. Takes miralax daily.  Takes magnesium daily. Addt hx breast cancer with mastectomy/chemo/rx.   - History of Hematuria: no  - Swelling: no  - Hx of UTIs: yes - see above  - Hx of stones: no  - Rashes/Joint pain: no rash; back pain present - hx of surgery  - Family hx of kidney disease: no  - NSAID use: no other NSAIDs; just celebrex    02/08/21: video visit. At the time of her initial visit, I recommended stopping celebrex, trial stopping prevacid, staying off of hydrochlorothiazide. Creatinine was 1.2 just after that visit, improved to 0.96 on 1/21 and is now 1.12 most recently on 2/3/21. She unfortunately has had ongoing difficulties with urinary frequency, urgency, and dysuria. Bactrim was sent in 2/4/21. She has a cystoscopy planned with  on 2/22/21 with a CT at that time as well. Her cultures have only shown mixed urogenital cher most recently.    Today she reports that she remains on celebrex - was on BID and now daily. She is following with Phani Tapia PA-C for her pain. BP has been good - at least 90/60, 100/62. It is this low despite being off of all BP meds. Heart rate has been 95. She is walking and exercising quite a bit: walking 3 miles per day and elliptical. MWF she also does weights. She feels she is drinking a lot of water/staying hydrated. Early AM dizziness. She tried stopping prevacid after last visit but she had heart burn sxs return - pepcid did not give the relief.      5/3/21: since last visit she was seen by urology and dx with high grade urothelial cancer. She underwent transurethral resection of the bladder tumor in March and was started on cisplatin on 4/20/21. She reports that she was on AZO for all of October and on it up until 3/18. She has an TAMIR now - reports diarrhea started Friday but stopped Sunday afternoon. She admits she has been taking celebrex up until 4/29. She reports that she is no longer with diarrhea and has had good hydration over the weekend.     8/10/21:  Phone visit. (She was  unable to get the video software to work.)  Since her last nephrology appointment she has undergone radical cystectomy and ileal conduit creation. Her medical oncologist is currently holding off on adjuvant chemotherapy pending further imaging.  She reports no current issues. She feels that her urine is about the same as before both in amount and in color. She reports that she keeps well hydrated, 8 cups of water per day.  Recently got a second opinion on her cancer at [place], CT urogram with contrast on 8/4.  No swelling, no difficulty breathing, no palpitations, no change in BM.    02/08/22: video visit. On opdivo currently. Has a CT urogram scheduled for tomorrow. She reports doing well overall. She has some side effects but feels they are controllable. Fatigue, nausea, feeling cold are main side effects. Some HAs but takes tylenol. No NSAIDs. She reports a little bit of swelling but not bad per her report. She reports some weight gain - 8 lbs over a few weeks but this has leveled off. She remains very active - exercises 2 hours per day. She does not find that the swelling is bothersome but does feel she retains water. She will note a sock line that resolves by AM. Her shoes are not tight. BP has been good - Not less than 100. She does add salt to her foods.     08/02/22: video visit. Creatinine improved to 0.8-0.9 most recently. still very active in her day - walks 3 miles, swims, horseriding. Urology in May - no concerns at that time. Followed by oncology - done with opdivo at this time - last seen in May -no sign of recurrence on last imaging.  Focusing on good hydration. No NSAID use. No swelling in legs.      Allergies   Allergen Reactions     Oxybutynin      Patient reports symptoms of nausea and mind fogginess       acetaminophen (TYLENOL) 500 MG tablet, Take 1,000 mg by mouth 3 times daily as needed for mild pain (500MG X 2 = 1,000MG)  ALPRAZolam (XANAX) 0.5 MG tablet, TAKE 1 TABLET BY MOUTH DAILY AT  BEDTIME AS NEEDED FOR SLEEP  aspirin (ASA) 81 MG chewable tablet, Take 1 tablet (81 mg) by mouth daily  atorvastatin (LIPITOR) 20 MG tablet, Take 1 tablet (20 mg) by mouth daily  carvedilol (COREG) 6.25 MG tablet, Take 1 tablet (6.25 mg) by mouth 2 times daily (with meals)  Cyanocobalamin (B-12 PO), Take by mouth daily   escitalopram (LEXAPRO) 20 MG tablet, Take 1 tablet (20 mg) by mouth daily  levothyroxine (SYNTHROID/LEVOTHROID) 25 MCG tablet, TAKE 1 TABLET (25 MCG) BY MOUTH DAILY  LORazepam (ATIVAN) 0.5 MG tablet, TAKE 1 TABLET BY MOUTH EVERY 4 HOURS AS NEEDED FOR ANXIETY, NAUSEA, VOMITING OR SLEEP  meclizine 25 MG CHEW, Take 25 mg by mouth every 6 hours as needed for dizziness   methocarbamol (ROBAXIN) 750 MG tablet, Take 1 tablet (750 mg) by mouth 4 times daily as needed for muscle spasms (or abdominal pain)  naltrexone (DEPADE/REVIA) 50 MG tablet, TAKE 1 TABLET BY MOUTH EVERY DAY  omeprazole (PRILOSEC) 20 MG DR capsule, Take 20 mg by mouth daily  oxyCODONE-acetaminophen (PERCOCET) 5-325 MG tablet, TAKE 1/2-1 TABLETS BY MOUTH EVERY 6 HOURS AS NEEDED FOR SEVERE PAIN  Probiotic Product (PROBIOTIC PO), Take by mouth daily  prochlorperazine (COMPAZINE) 10 MG tablet, TAKE 1/2 TABLET BY MOUTH EVERY 6 HOURS AS NEEDED FORNAUSEA/VOMITING.  SENNA-docusate sodium (SENNA S) 8.6-50 MG tablet, Take 1 tablet by mouth At Bedtime  sucralfate (CARAFATE) 1 GM tablet, Take 1 tablet (1 g) by mouth 4 times daily  buPROPion (WELLBUTRIN XL) 150 MG 24 hr tablet, Take 1 tablet (150 mg) by mouth every morning (Patient not taking: Reported on 8/2/2022)  METAMUCIL FIBER PO, Take 2 teaspoonful by mouth daily  (Patient not taking: No sig reported)  nivolumab (OPDIVO) 100 MG/10ML, Inject into the vein every 30 days (Patient not taking: Reported on 8/2/2022)  predniSONE (DELTASONE) 20 MG tablet, One tab (20 mg) PO qday PRN immunotherapy-related headaches. (Patient not taking: No sig reported)  STOOL SOFTENER/LAXATIVE 50-8.6 MG tablet, TAKE 2  TABLETS BY MOUTH DAILY AT BEDTIME (Patient not taking: Reported on 7/1/2022)    No current facility-administered medications on file prior to visit.    Exam:  /71 (BP Location: Right arm, Patient Position: Sitting)   Gen - alert and oriented.       Results:  No visits with results within 1 Day(s) from this visit.   Latest known visit with results is:   Lab on 07/15/2022   Component Date Value Ref Range Status     Vitamin B12 07/15/2022 712  232 - 1,245 pg/mL Final       Assessment/Plan:   1. CKD STage 3: at risk for CKD in the setting of hypotension, NSAID use, PPI use, recurrent UTIs, previous TAMIR events.  Although not normal, pleased to see that her kidney function has stabilized a bit. She is at risk with CT Scans with contrast - stressed importance of good hydration pre and post scans with hopes of minimizing risk of contrast related injury.   - mild proteinuria which may be related to scar tissue from previous AKIs. Typically would consider ACE-I or ARB, however, given previous TAMIR events, will hold off and monitor for now.     2. Hypotension: resolved    3. Anemia: resolved.     4. Edema: resolved - no diuretic needs.     5. Urothelial cancer: follows with Dr. Robles and Dr. Sauer    Patient Instructions   Follow-up in nephrology clinic only as needed.        [id number] AM video visit via CHAY Smith DO

## 2022-08-03 ENCOUNTER — TELEPHONE (OUTPATIENT)
Dept: ONCOLOGY | Facility: CLINIC | Age: 72
End: 2022-08-03

## 2022-08-03 DIAGNOSIS — Z93.2 ILEOSTOMY STATUS (H): Primary | ICD-10-CM

## 2022-08-03 DIAGNOSIS — Z93.6 STATUS OF ARTIFICIAL OPENING OF URINARY TRACT (H): ICD-10-CM

## 2022-08-03 NOTE — TELEPHONE ENCOUNTER
Forms/Letter Request    Type of form/letter: [facility]    Have you been seen for this request:     Do we have the form/letter: Yes: placed in providers bin     When is form/letter needed by: ASAP    How would you like the form/letter returned: Fax    Patient Notified form requests are processed in 3-5 business days:No    Could we [facility] this [facility] or would you prefer to receive a phone call?:

## 2022-08-04 NOTE — TELEPHONE ENCOUNTER
This looks like a medical records release and I am not sure what they are looking for.  I suspect that due to the nature of the request for ostomy supplies this will need to be a face-to-face visit to covered for her insurance.  I do not see where I have actually placed ostomy supply orders so we need more information both from the insurance company and probably a face-to-face visit for the patient.  Please contact both to see exactly what is needed.  The form will remain in my forms bin above my desk.  Electronically signed:    Phani Jason PA-C

## 2022-08-10 NOTE — TELEPHONE ENCOUNTER
Called pt to discuss - Per pt you have been filling these supplies for years and she is confused why she would need another appointment to discuss.   Talked to Liu at [facility] [employer] [place] and he is unsure of what the fax you received is wanting either and why it would have been sent to us - her order actually was processed about 15 min before I called and everything looks good on his end - per Liu the order is getting shipped to them and then pt will receive the supplies the next day so pt will have them middle of next week. NALLELY I shredded the request that was in your provider bin as nothing further is needed.

## 2022-08-16 NOTE — TELEPHONE ENCOUNTER
Giuliana calling from [employer] still looking for forms related to this encounter. She states that the release was for the notes from the March 10th visit signed and dated and faxed back to them.     Can call Giuliana with questions at [phone] can leave detailed message.

## 2022-08-17 NOTE — TELEPHONE ENCOUNTER
[facility] [place] - Fax we received was sent to Phani Jason - he did not see pt on March 10th but oncology did - are they needing that progress note? Do they just need a progress note with date and signature? Where to fax to as I shredded the last fax as I had called last week and was told that everything was taken care of and nothing further was needed.

## 2022-08-19 ENCOUNTER — MEDICAL CORRESPONDENCE (OUTPATIENT)
Dept: HEALTH INFORMATION MANAGEMENT | Facility: CLINIC | Age: 72
End: 2022-08-19

## 2022-08-19 PROBLEM — Z93.6: Status: ACTIVE | Noted: 2022-08-19

## 2022-08-19 PROBLEM — Z93.2 ILEOSTOMY STATUS (H): Status: ACTIVE | Noted: 2022-08-19

## 2022-08-19 NOTE — TELEPHONE ENCOUNTER
"Called and discussed with Giuliana - She did more research and didn't see anything from the March visit with CARLY that would qualify as a diagnosis code to get these supplies. Code needs to be either a K or a Z code. Giuliana was going to look into this more and fax over form to see if we have any of the qualifying diagnosis on pt's chart. If no diagnosis  - please write \"no qualifying diagnosis\" and sign form. Otherwise one of the codes on fax need to be added to patients chart and visit from 3/18/22 needs to be signed by Counts include [address] beds at the [facility] and [facility]  [phone]. Forms placed in providers basket.   "

## 2022-08-19 NOTE — TELEPHONE ENCOUNTER
Phani added diagnoses code to 3/18/22 visit. Forms signed and diagnosis code added on form. I faxed forms back to [facility] and put in scanning bin

## 2022-09-01 ENCOUNTER — TELEPHONE (OUTPATIENT)
Dept: NEPHROLOGY | Facility: CLINIC | Age: 72
End: 2022-09-01

## 2022-09-01 ENCOUNTER — MYC REFILL (OUTPATIENT)
Dept: ONCOLOGY | Facility: CLINIC | Age: 72
End: 2022-09-01

## 2022-09-01 DIAGNOSIS — G03.9 MENINGITIS: ICD-10-CM

## 2022-09-01 DIAGNOSIS — I10 HYPERTENSION GOAL BP (BLOOD PRESSURE) < 140/90: ICD-10-CM

## 2022-09-01 DIAGNOSIS — C67.8 MALIGNANT NEOPLASM OF OVERLAPPING SITES OF BLADDER (H): ICD-10-CM

## 2022-09-01 RX ORDER — PREDNISONE 20 MG/1
TABLET ORAL
Qty: 5 TABLET | Refills: 0 | OUTPATIENT
Start: 2022-09-01

## 2022-09-01 NOTE — TELEPHONE ENCOUNTER
Spoke with patient regarding refusal for prednisone prescription. Patient states she is having headaches now but she forgot to refill her coreg. Patient states a refill request was sent to her provider Dr. Smith. Will send message to team regarding symptoms.  Noemi Garcia RNCC

## 2022-09-01 NOTE — TELEPHONE ENCOUNTER
[facility] Call Center    Phone Message    May a detailed message be left on voicemail: yes     Reason for Call: Medication Refill Request    Has the patient contacted the pharmacy for the refill? Yes   Name of medication being requested: carvedilol (COREG) 6.25 MG tablet  Provider who prescribed the medication: Luis  Pharmacy:    [id number] - [address]  Date medication is needed: ASAP. The patient is requesting this medication but the pharmacy says she hasn't refilled since February 2022, and they weren't certain if she should still be on this medication. Please advise Thank you.     Action Taken: Message routed to:  Adult Clinics: Nephrology p [id number]    Travel Screening: Not Applicable

## 2022-09-01 NOTE — TELEPHONE ENCOUNTER
Unclear why we're still needing prednisone; this was a limited amount for some immunotherapy-related headaches and not chronic usage.    Girish Sauer MD.

## 2022-09-06 ENCOUNTER — INFUSION THERAPY VISIT (OUTPATIENT)
Dept: INFUSION THERAPY | Facility: CLINIC | Age: 72
End: 2022-09-06
Attending: INTERNAL MEDICINE
Payer: MEDICARE

## 2022-09-06 ENCOUNTER — HOSPITAL ENCOUNTER (OUTPATIENT)
Dept: CT IMAGING | Facility: CLINIC | Age: 72
Discharge: HOME | End: 2022-09-06
Attending: INTERNAL MEDICINE
Payer: MEDICARE

## 2022-09-06 DIAGNOSIS — C67.8 MALIGNANT NEOPLASM OF OVERLAPPING SITES OF BLADDER (H): ICD-10-CM

## 2022-09-06 DIAGNOSIS — C77.5 SECONDARY AND UNSPECIFIED MALIGNANT NEOPLASM OF INTRAPELVIC LYMPH NODES (H): Primary | ICD-10-CM

## 2022-09-06 DIAGNOSIS — D64.9 NORMOCYTIC ANEMIA: ICD-10-CM

## 2022-09-06 LAB
CREAT BLD-MCNC: 1 MG/DL (ref 0.5–1)
FERRITIN SERPL-MCNC: 70 NG/ML (ref 8–252)
GFR SERPL CREATININE-BSD FRML MDRD: 60 ML/MIN/1.73M2

## 2022-09-06 PROCEDURE — 82728 ASSAY OF FERRITIN: CPT

## 2022-09-06 PROCEDURE — 74177 CT ABD & PELVIS W/CONTRAST: CPT | Mod: MG

## 2022-09-06 PROCEDURE — 82565 ASSAY OF CREATININE: CPT

## 2022-09-06 PROCEDURE — 250N000009 HC RX 250: Performed by: INTERNAL MEDICINE

## 2022-09-06 PROCEDURE — 250N000011 HC RX IP 250 OP 636: Performed by: INTERNAL MEDICINE

## 2022-09-06 PROCEDURE — 36591 DRAW BLOOD OFF VENOUS DEVICE: CPT

## 2022-09-06 RX ORDER — HEPARIN SODIUM (PORCINE) LOCK FLUSH IV SOLN 100 UNIT/ML 100 UNIT/ML
5 SOLUTION INTRAVENOUS
Status: DISCONTINUED | OUTPATIENT
Start: 2022-09-06 | End: 2022-09-06 | Stop reason: HOSPADM

## 2022-09-06 RX ORDER — HEPARIN SODIUM (PORCINE) LOCK FLUSH IV SOLN 100 UNIT/ML 100 UNIT/ML
5 SOLUTION INTRAVENOUS
Status: CANCELLED | OUTPATIENT
Start: 2022-09-06

## 2022-09-06 RX ORDER — IOPAMIDOL 755 MG/ML
500 INJECTION, SOLUTION INTRAVASCULAR ONCE
Status: COMPLETED | OUTPATIENT
Start: 2022-09-06 | End: 2022-09-06

## 2022-09-06 RX ORDER — CARVEDILOL 6.25 MG/1
6.25 TABLET ORAL 2 TIMES DAILY WITH MEALS
Qty: 90 TABLET | Refills: 3 | Status: SHIPPED | OUTPATIENT
Start: 2022-09-06 | End: 2023-08-03

## 2022-09-06 RX ADMIN — Medication 5 ML: at 10:58

## 2022-09-06 RX ADMIN — IOPAMIDOL 65 ML: 755 INJECTION, SOLUTION INTRAVENOUS at 10:44

## 2022-09-06 RX ADMIN — SODIUM CHLORIDE 60 ML: 9 INJECTION, SOLUTION INTRAVENOUS at 10:44

## 2022-09-06 NOTE — PROGRESS NOTES
Infusion Nursing Note:  Michaela Dorman presents today for port access w/labs prior to CT.    Patient seen by provider today: No   present during visit today: Not Applicable.    Note: N/A.    Intravenous Access:  Implanted Port.    Treatment Conditions:  Not Applicable.    Post Infusion Assessment:  Blood return noted pre and post CT.  Site patent and intact, free from redness, edema or discomfort.  No evidence of extravasations.  Access discontinued per protocol.     Discharge Plan:   Discharge instructions reviewed with: Patient.  Patient discharged in stable condition accompanied by: self.  Departure Mode: Ambulatory.      Susanne Yu RN

## 2022-09-06 NOTE — TELEPHONE ENCOUNTER
Contacted patient. She was taking this medication, though then ran out. She stated that she had been off for about one week. Will refill medication for patient.    Radha Pinto RN, BSN  Nephrology Care Coordinator  [facility]

## 2022-09-08 ENCOUNTER — VIRTUAL VISIT (OUTPATIENT)
Dept: ONCOLOGY | Facility: CLINIC | Age: 72
End: 2022-09-08
Payer: COMMERCIAL

## 2022-09-08 ENCOUNTER — TELEPHONE (OUTPATIENT)
Dept: FAMILY MEDICINE | Facility: OTHER | Age: 72
End: 2022-09-08

## 2022-09-08 DIAGNOSIS — G03.9 MENINGITIS: ICD-10-CM

## 2022-09-08 DIAGNOSIS — D50.8 OTHER IRON DEFICIENCY ANEMIA: ICD-10-CM

## 2022-09-08 DIAGNOSIS — Z17.1 MALIGNANT NEOPLASM OF UPPER-OUTER QUADRANT OF LEFT BREAST IN FEMALE, ESTROGEN RECEPTOR NEGATIVE (H): ICD-10-CM

## 2022-09-08 DIAGNOSIS — C50.412 MALIGNANT NEOPLASM OF UPPER-OUTER QUADRANT OF LEFT BREAST IN FEMALE, ESTROGEN RECEPTOR NEGATIVE (H): ICD-10-CM

## 2022-09-08 DIAGNOSIS — C67.8 MALIGNANT NEOPLASM OF OVERLAPPING SITES OF BLADDER (H): Primary | ICD-10-CM

## 2022-09-08 PROCEDURE — 99214 OFFICE O/P EST MOD 30 MIN: CPT | Mod: 95 | Performed by: INTERNAL MEDICINE

## 2022-09-08 NOTE — Clinical Note
2022         RE: Michaela Dorman  [address]  [facility] [address]        Dear Colleague,    Thank you for referring your patient, Michaela Dorman, to the [facility] [place]. Please see a copy of my visit note below.    [facility] Hematology / Oncology  Progress Note  Name: Michaela Dorman  :  1950    MRN:  [id number]    --------------------    Assessment / Plan:  Left-sided ER-, HER2+ breast CA:  # Sep 2016 Presented w/ left axillary mass; staging multicentric T4 lesion w/ geraldine involvement; biopsy w/ ER-, HER2+ breast cancer  # Sep 2016 - 2017 Neoadjuvant AC x 4 cycles/TH x 4 cycles.  # 2017 Bilateral mastectomy no with geraldine evaluation; complete path CR from invasive cancer; 7 mm DCIS residual.  # 2017 - May 2017 Adjuvant radiation to left chestwall / axilla.  # 2017 - Dec 2017 Adjuvant Herceptin.    Bladder cancer:([id number])  # Oct 2020 Presented w/ dysuria.  # 2021 Cytoscopy w/ muscle-invasive high grade urothelial cancer.  # Mar 2021 TURBT.  # 2021 Neoadjuvant cisplatin/gem split dose complicated by TAMIR.  # May 2021 Neoadjuvant carboplatin/gem.  # 2021 Radical cystectomy w/ ileal conduit; path high grade urothelial carcinoma muscle invasive; margins negs; nodes negative.  # 2021 - 2022 Adjuvant opdivo; discontinued 2/2 headaches.    Clinically and radiographically DAGOBERTO.  Remains on oral iron; hemoglobin has normalized; likely discontinue in 3 months.  Reviewed incidental findings on imaging; lymphocele waxes/wanes ever since cystectomy.  Checkpoint meningitis / cerebritis has resolved w/ prednisone and not recurred.  Reviewed covid and influenza vaccinations.  RTC 3 months w/ labs (CBC, CMP, ferritin, TSH) and CT CAP.    Girish Sauer MD    --------------------    Interval History:  Michaela returns for follow up bladder and breast cancer.  All in all, Kristi describes that her headaches are resolved.  She has been  off prednisone for some time now.  Rash has resolved.  Beyond that no new complaints.  Stable weight appetite and energy.  Feeling great!    --------------------    Physical Exam:  Video visit.    Labs / Imaging / Path:  We reviewed creat.  We personally reviewed her CT CAP.    Video Visit:  Michaela is a 71 year old female who is being evaluated via a billable video visit.  }    Video start time: 10:10 AM  Video end time: 10:28 AM    Provider location: Off-site  Patient location: Home    Mode of transmission: [employer] / [employer].        Again, thank you for allowing me to participate in the care of your patient.        Sincerely,        Girish Sauer MD

## 2022-09-08 NOTE — PROGRESS NOTES
[facility] Hematology / Oncology  Progress Note  Name: Michaela Dorman  :  1950    MRN:  [id number]    --------------------    Assessment / Plan:  Left-sided ER-, HER2+ breast CA:  # Sep 2016 Presented w/ left axillary mass; staging multicentric T4 lesion w/ geraldine involvement; biopsy w/ ER-, HER2+ breast cancer  # Sep 2016 - 2017 Neoadjuvant AC x 4 cycles/TH x 4 cycles.  # 2017 Bilateral mastectomy no with geraldine evaluation; complete path CR from invasive cancer; 7 mm DCIS residual.  # 2017 - May 2017 Adjuvant radiation to left chestwall / axilla.  # 2017 - Dec 2017 Adjuvant Herceptin.    Bladder cancer:([id number])  # Oct 2020 Presented w/ dysuria.  # 2021 Cytoscopy w/ muscle-invasive high grade urothelial cancer.  # Mar 2021 TURBT.  # 2021 Neoadjuvant cisplatin/gem split dose complicated by TAMIR.  # May 2021 Neoadjuvant carboplatin/gem.  # 2021 Radical cystectomy w/ ileal conduit; path high grade urothelial carcinoma muscle invasive; margins negs; nodes negative.  # 2021 - 2022 Adjuvant opdivo; discontinued 2/2 headaches.    Clinically and radiographically DAGOBERTO.  Remains on oral iron; hemoglobin has normalized; likely discontinue in 3 months.  Reviewed incidental findings on imaging; lymphocele waxes/wanes ever since cystectomy.  Checkpoint meningitis / cerebritis has resolved w/ prednisone and not recurred.  Reviewed covid and influenza vaccinations.  RTC 3 months w/ labs (CBC, CMP, ferritin, TSH) and CT CAP.    Girish Sauer MD    --------------------    Interval History:  Michaela returns for follow up bladder and breast cancer.  All in all, Kristi describes that her headaches are resolved.  She has been off prednisone for some time now.  Rash has resolved.  Beyond that no new complaints.  Stable weight appetite and energy.  Feeling great!    --------------------    Physical Exam:  Video visit.    Labs / Imaging / Path:  We reviewed creat.  We personally  reviewed her CT CAP.    Video Visit:  Michaela is a 71 year old female who is being evaluated via a billable video visit.  }    Video start time: 10:10 AM  Video end time: 10:28 AM    Provider location: Off-site  Patient location: Home    Mode of transmission: [employer] / [employer].

## 2022-09-08 NOTE — TELEPHONE ENCOUNTER
Forms/Letter Request    Type of form/letter: [facility]    Have you been seen for this request: N/A    Do we have the form/letter: Yes: Placed in TEam D form bin    When is form/letter needed by: unknown    How would you like the form/letter returned: Fax    Patient Notified form requests are processed in 3-5 business days:No    Fax [phone]

## 2022-09-08 NOTE — PATIENT INSTRUCTIONS
BJT W[place] 3 months w/ labs (CBC, CMP, ferritin) and CT CAP.    Girish Sauer MD.    Today:  Follow up in 3 months.    Please follow up with Dr. Sauer.  Please schedule labs and CT prior to follow up appointment.    Lab Date/Time: 11/28/22 @10:15am    [facility]    Office visit follow up with Dr. Sauer Date/Time: 12/1/22 @3:30pm         If you have any questions or concerns please feel free to call.    If you need to reschedule please call:  Clinic or Lab Appointment - [phone]  Infusion - [phone]  Imaging - [phone]    Veronica Hale [facility]  [phone]

## 2022-09-09 NOTE — TELEPHONE ENCOUNTER
Called pt to schedule appt she stated that the form needs to be filled out asap so she can get her medical supplies. Your next opening isn't until 10/4 would you be willing to work in before that?

## 2022-09-09 NOTE — TELEPHONE ENCOUNTER
Form placed in providers bin for review and signature if appropriate  Kae Winters MA on 9/9/2022 at 12:01 PM

## 2022-09-09 NOTE — TELEPHONE ENCOUNTER
I have a try to address this form virtually on multiple dates.  She will need to have a face-to-face visit for completion of the medical record to satisfy the requirements by the DME company.  Please help her arrange for a face-to-face follow-up visit.

## 2022-09-12 ENCOUNTER — OFFICE VISIT (OUTPATIENT)
Dept: FAMILY MEDICINE | Facility: OTHER | Age: 72
End: 2022-09-12
Payer: COMMERCIAL

## 2022-09-12 VITALS
RESPIRATION RATE: 16 BRPM | TEMPERATURE: 97.8 F | HEIGHT: 66 IN | SYSTOLIC BLOOD PRESSURE: 112 MMHG | DIASTOLIC BLOOD PRESSURE: 72 MMHG | WEIGHT: 137.31 LBS | OXYGEN SATURATION: 100 % | BODY MASS INDEX: 22.07 KG/M2 | HEART RATE: 71 BPM

## 2022-09-12 DIAGNOSIS — Z93.2 ILEOSTOMY STATUS (H): Primary | ICD-10-CM

## 2022-09-12 DIAGNOSIS — C50.412 MALIGNANT NEOPLASM OF UPPER-OUTER QUADRANT OF LEFT BREAST IN FEMALE, ESTROGEN RECEPTOR NEGATIVE (H): ICD-10-CM

## 2022-09-12 DIAGNOSIS — G89.4 CHRONIC PAIN SYNDROME: ICD-10-CM

## 2022-09-12 DIAGNOSIS — F43.21 ADJUSTMENT DISORDER WITH DEPRESSED MOOD: ICD-10-CM

## 2022-09-12 DIAGNOSIS — I10 HYPERTENSION GOAL BP (BLOOD PRESSURE) < 140/90: ICD-10-CM

## 2022-09-12 DIAGNOSIS — Z13.220 SCREENING FOR HYPERLIPIDEMIA: ICD-10-CM

## 2022-09-12 DIAGNOSIS — Z93.6 STATUS POST ILEAL CONDUIT (H): ICD-10-CM

## 2022-09-12 DIAGNOSIS — Z17.1 MALIGNANT NEOPLASM OF UPPER-OUTER QUADRANT OF LEFT BREAST IN FEMALE, ESTROGEN RECEPTOR NEGATIVE (H): ICD-10-CM

## 2022-09-12 DIAGNOSIS — R53.82 CHRONIC FATIGUE: ICD-10-CM

## 2022-09-12 DIAGNOSIS — C67.9 UROTHELIAL CARCINOMA OF BLADDER WITH INVASION OF MUSCLE (H): ICD-10-CM

## 2022-09-12 DIAGNOSIS — C67.8 MALIGNANT NEOPLASM OF OVERLAPPING SITES OF BLADDER (H): ICD-10-CM

## 2022-09-12 DIAGNOSIS — Z85.51 PERSONAL HISTORY OF MALIGNANT NEOPLASM OF BLADDER: ICD-10-CM

## 2022-09-12 LAB
CHOLEST SERPL-MCNC: 206 MG/DL
FASTING STATUS PATIENT QL REPORTED: NO
HDLC SERPL-MCNC: 80 MG/DL
LDLC SERPL CALC-MCNC: 90 MG/DL
NONHDLC SERPL-MCNC: 126 MG/DL
TRIGL SERPL-MCNC: 179 MG/DL

## 2022-09-12 PROCEDURE — 36415 COLL VENOUS BLD VENIPUNCTURE: CPT | Performed by: PHYSICIAN ASSISTANT

## 2022-09-12 PROCEDURE — 99214 OFFICE O/P EST MOD 30 MIN: CPT | Mod: 25 | Performed by: PHYSICIAN ASSISTANT

## 2022-09-12 PROCEDURE — 90471 IMMUNIZATION ADMIN: CPT | Performed by: PHYSICIAN ASSISTANT

## 2022-09-12 PROCEDURE — 90715 TDAP VACCINE 7 YRS/> IM: CPT | Performed by: PHYSICIAN ASSISTANT

## 2022-09-12 PROCEDURE — 80061 LIPID PANEL: CPT | Performed by: PHYSICIAN ASSISTANT

## 2022-09-12 ASSESSMENT — PAIN SCALES - GENERAL: PAINLEVEL: NO PAIN (0)

## 2022-09-12 NOTE — PROGRESS NOTES
Prior to immunization administration, verified patients identity using patient s name and date of birth. Please see Immunization Activity for additional information.     Screening Questionnaire for Adult Immunization    Are you sick today?   No   Do you have allergies to medications, food, a vaccine component or latex?   No   Have you ever had a serious reaction after receiving a vaccination?   No   Do you have a long-term health problem with heart, lung, kidney, or metabolic disease (e.g., diabetes), asthma, a blood disorder, no spleen, complement component deficiency, a cochlear implant, or a spinal fluid leak?  Are you on long-term aspirin therapy?   No   Do you have cancer, leukemia, HIV/AIDS, or any other immune system problem?   No   Do you have a parent, brother, or sister with an immune system problem?   No   In the past 3 months, have you taken medications that affect  your immune system, such as prednisone, other steroids, or anticancer drugs; drugs for the treatment of rheumatoid arthritis, Crohn s disease, or psoriasis; or have you had radiation treatments?   No   Have you had a seizure, or a brain or other nervous system problem?   No   During the past year, have you received a transfusion of blood or blood    products, or been given immune (gamma) globulin or antiviral drug?   No   For women: Are you pregnant or is there a chance you could become       pregnant during the next month?   No   Have you received any vaccinations in the past 4 weeks?   No     Immunization questionnaire answers were all negative.        Per orders of [facility], injection of TDAP given by Patrica Gee MA. Patient instructed to remain in clinic for 15 minutes afterwards, and to report any adverse reaction to me immediately.       Screening performed by Patrica Gee MA on 9/12/2022 at 3:52 PM.

## 2022-09-12 NOTE — PROGRESS NOTES
Assessment & Plan     Ileostomy status (H)  Status post ileal conduit (H)  Malignant neoplasm of overlapping sites of bladder (H)  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)  Personal history of malignant neoplasm of bladder  Cystectomy with ileal conduit 6/1/2021  FINAL DIAGNOSIS:   C. 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 in the periurethral ducts at   urethral margin   - Pathologic stage: [id number]   - Benign uterus, cervix, vaginal wall, ovaries and fallopian tubes with   physiological changes  Patient has ileostomy in place and uses the supplies every 3 years to change them and support good hygiene.  She has been able to find the minimal supplies necessary to take care of her long-term ileostomy status.    Chronic fatigue  More than likely secondary to the above.  When she has a reason to get up and get moving she has no problems with doing the tasks of the day.  Specifically she notes that when her grandchildren are at her home to ride horses she enjoys today immensely.  Advised that she begin to take women's multivitamin with iron and simply observe and keep yourself busy.    Screening for hyperlipidemia  - Lipid panel reflex to direct LDL Non-fasting; Future    Chronic pain syndrome  Secondary to the above    Hypertension goal BP (blood pressure) < 140/90  Good control at this point time no new concerns.  Follow-up as needed.    Adjustment disorder with depressed mood  Very stable at this point in time does not require or request any refills or psychologic counseling.    Malignant neoplasm of upper-outer quadrant of left breast in female, estrogen receptor negative (H)  Historically noted.  She continues to follow-up with specialist in this regard.        Regular exercise  No  "follow-ups on file.    SANDY Howell [facility] NAI Alcaraz is a 71 year old, presenting for the following health issues:  Forms      History of Present Illness       Reason for visit:  General checkup    She eats 0-1 servings of fruits and vegetables daily.She consumes 0 sweetened beverage(s) daily.She exercises with enough effort to increase her heart rate 9 or less minutes per day.  She exercises with enough effort to increase her heart rate 3 or less days per week.   She is taking medications regularly.       Pt needs form filled out for supplies    Annual Wellness Visit    Patient has been advised of split billing requirements and indicates understanding: Yes     Are you in the first 12 months of your Medicare Part B coverage?  No    Physical Health:    In general, how would you rate your overall physical health? fair    Outside of work, how many days during the week do you exercise?6-7 days/week    Outside of work, approximately how many minutes a day do you exercise?30-45 minutes    If you drink alcohol do you typically have >3 drinks per day or >7 drinks per week? No    Do you usually eat at least 4 servings of fruit and vegetables a day, include whole grains & fiber and avoid regularly eating high fat or \"junk\" foods? NO    Do you have any problems taking medications regularly? No    Do you have any side effects from medications? none    Needs assistance for the following daily activities: no assistance needed    Which of the following safety concerns are present in your home?  none identified     Hearing impairment: No    In the past 6 months, have you been bothered by leaking of urine? no    Mental Health:    In general, how would you rate your overall mental or emotional health? fair  PHQ-2 Score:      Do you feel safe in your environment? Yes    Have you ever done Advance Care Planning? (For example, a Health Directive, POLST, or a discussion with a medical " "provider or your loved ones about your wishes)? Yes, patient states has an Advance Care Planning document and will bring a copy to the clinic.    Fall risk:  Fallen 2 or more times in the past year?: No  Any fall with injury in the past year?: No    Cognitive Screenin) Repeat 3 items (Leader, Season, Table)    2) Clock draw: NORMAL  3) 3 item recall: Recalls 2 objects   Results: 3 items recalled: COGNITIVE IMPAIRMENT LESS LIKELY    Mini-CogTM Copyright BREE Kinney. Licensed by the author for use in [facility]; reprinted with permission (cindi@[facility]). All rights reserved.        Current providers sharing in care for this patient include:   Patient Care Team:  Girish Sauer MD as PCP - General (Hematology & Oncology)  Phani Tapia PA-C as Assigned PCP  Lacho Jasmine MD as Assigned Heart and Vascular Provider  Akiko Smith DO as Assigned Nephrology Provider  Leonardo Robles MD as MD (Urology)  Leola Ellison, RN as Registered Nurse (Oncology)  Leonardo Robles MD as Assigned Cancer Care Provider  Girish Jack MD as Assigned Surgical Provider  Noemi Garcia, RN as Specialty Care Coordinator (Hematology & Oncology)    Patient has been advised of split billing requirements and indicates understanding: Yes    Review of Systems   Constitutional, HEENT, cardiovascular, pulmonary, GI, , musculoskeletal, neuro, skin, endocrine and psych systems are negative, except as otherwise noted.      Objective    /72 (BP Location: Right arm, Patient Position: Sitting, Cuff Size: Adult Regular)   Pulse 71   Temp 97.8  F (36.6  C) (Temporal)   Resp 16   Ht 1.676 m (5' 6\")   Wt 62.3 kg (137 lb 5 oz)   SpO2 100%   BMI 22.16 kg/m    Body mass index is 22.16 kg/m .  Physical Exam   GENERAL: healthy, alert and no distress  NECK: no adenopathy, no asymmetry, masses, or scars and thyroid normal to palpation  RESP: lungs clear to auscultation - no rales, " rhonchi or wheezes  CV: regular rate and rhythm, normal S1 S2, no S3 or S4, no murmur, click or rub, no peripheral edema and peripheral pulses strong  ABDOMEN: soft, nontender, no hepatosplenomegaly, no masses and bowel sounds normal  MS: no gross musculoskeletal defects noted, no edema    No results found. However, due to the size of the patient record, not all encounters were searched. Please check Results Review for a complete set of results.

## 2022-09-13 DIAGNOSIS — R10.9 GASTRIC PAIN: ICD-10-CM

## 2022-09-13 DIAGNOSIS — G89.4 CHRONIC PAIN SYNDROME: ICD-10-CM

## 2022-09-14 NOTE — TELEPHONE ENCOUNTER
Pending Prescriptions:                       Disp   Refills    oxyCODONE-acetaminophen (PERCOCET) 5-325 M*60 tab*0        Sig: TAKE 1/2-1 TABLETS BY MOUTHEVERY 6 HOURS AS NEEDED           FORSEVERE PAIN    Routing refill request to provider for review/approval because:  Drug not on the [facility] refill protocol   Mercedes Mancia RN

## 2022-09-15 RX ORDER — OXYCODONE AND ACETAMINOPHEN 5; 325 MG/1; MG/1
TABLET ORAL
Qty: 60 TABLET | Refills: 0 | Status: SHIPPED | OUTPATIENT
Start: 2022-09-15 | End: 2022-11-04

## 2022-09-16 RX ORDER — SUCRALFATE 1 G/1
1 TABLET ORAL 4 TIMES DAILY
Qty: 120 TABLET | Refills: 1 | Status: SHIPPED | OUTPATIENT
Start: 2022-09-16 | End: 2022-11-03

## 2022-09-17 DIAGNOSIS — C67.9 UROTHELIAL CARCINOMA OF BLADDER WITH INVASION OF MUSCLE (H): ICD-10-CM

## 2022-09-17 DIAGNOSIS — Z51.11 ENCOUNTER FOR ANTINEOPLASTIC CHEMOTHERAPY: ICD-10-CM

## 2022-09-17 DIAGNOSIS — C50.412 MALIGNANT NEOPLASM OF UPPER-OUTER QUADRANT OF LEFT FEMALE BREAST, UNSPECIFIED ESTROGEN RECEPTOR STATUS (H): ICD-10-CM

## 2022-09-19 ENCOUNTER — MYC MEDICAL ADVICE (OUTPATIENT)
Dept: FAMILY MEDICINE | Facility: OTHER | Age: 72
End: 2022-09-19

## 2022-09-19 DIAGNOSIS — R53.81 MALAISE AND FATIGUE: ICD-10-CM

## 2022-09-19 DIAGNOSIS — Z85.3 PERSONAL HISTORY OF MALIGNANT NEOPLASM OF BREAST: ICD-10-CM

## 2022-09-19 DIAGNOSIS — Z17.1 MALIGNANT NEOPLASM OF UPPER-OUTER QUADRANT OF LEFT BREAST IN FEMALE, ESTROGEN RECEPTOR NEGATIVE (H): ICD-10-CM

## 2022-09-19 DIAGNOSIS — Z85.51 PERSONAL HISTORY OF MALIGNANT NEOPLASM OF BLADDER: ICD-10-CM

## 2022-09-19 DIAGNOSIS — C50.412 MALIGNANT NEOPLASM OF UPPER-OUTER QUADRANT OF LEFT BREAST IN FEMALE, ESTROGEN RECEPTOR NEGATIVE (H): ICD-10-CM

## 2022-09-19 DIAGNOSIS — C67.8 MALIGNANT NEOPLASM OF OVERLAPPING SITES OF BLADDER (H): ICD-10-CM

## 2022-09-19 DIAGNOSIS — T45.1X5A CHEMOTHERAPY-INDUCED NEUTROPENIA (H): ICD-10-CM

## 2022-09-19 DIAGNOSIS — F43.21 ADJUSTMENT DISORDER WITH DEPRESSED MOOD: Primary | ICD-10-CM

## 2022-09-19 DIAGNOSIS — R53.83 MALAISE AND FATIGUE: ICD-10-CM

## 2022-09-19 DIAGNOSIS — M06.09 RHEUMATOID ARTHRITIS OF MULTIPLE SITES WITH NEGATIVE RHEUMATOID FACTOR (H): ICD-10-CM

## 2022-09-19 DIAGNOSIS — D70.1 CHEMOTHERAPY-INDUCED NEUTROPENIA (H): ICD-10-CM

## 2022-09-19 RX ORDER — PROCHLORPERAZINE MALEATE 10 MG
TABLET ORAL
Qty: 30 TABLET | Refills: 2 | Status: SHIPPED | OUTPATIENT
Start: 2022-09-19 | End: 2023-03-27

## 2022-09-22 ENCOUNTER — MYC MEDICAL ADVICE (OUTPATIENT)
Dept: FAMILY MEDICINE | Facility: OTHER | Age: 72
End: 2022-09-22

## 2022-09-26 NOTE — TELEPHONE ENCOUNTER
Routing to provider to review.   See my chart messages from 9/19/22.  Would this test be appropriate?    Joyce Singletary BSN, RN

## 2022-10-09 ENCOUNTER — HEALTH MAINTENANCE LETTER (OUTPATIENT)
Age: 72
End: 2022-10-09

## 2022-10-12 DIAGNOSIS — Z17.1 MALIGNANT NEOPLASM OF UPPER-OUTER QUADRANT OF LEFT BREAST IN FEMALE, ESTROGEN RECEPTOR NEGATIVE (H): ICD-10-CM

## 2022-10-12 DIAGNOSIS — C50.412 MALIGNANT NEOPLASM OF UPPER-OUTER QUADRANT OF LEFT BREAST IN FEMALE, ESTROGEN RECEPTOR NEGATIVE (H): ICD-10-CM

## 2022-10-17 RX ORDER — LORAZEPAM 0.5 MG/1
TABLET ORAL
Qty: 30 TABLET | Refills: 0 | Status: SHIPPED | OUTPATIENT
Start: 2022-10-17 | End: 2022-11-16

## 2022-11-02 ENCOUNTER — PRE VISIT (OUTPATIENT)
Dept: UROLOGY | Facility: CLINIC | Age: 72
End: 2022-11-02

## 2022-11-02 NOTE — TELEPHONE ENCOUNTER
Reason for visit: Follow up w/ CT and labs     Relevant information: Bladder cancer s/p radical cystectomy and ileal conduit 6/2021    Records/imaging/labs/orders: CT chest/abd/pelvis on 9/6; BMP, cytology at [facility] on 11/12    Pt called: Send MyChart about labs    At Rooming: Video

## 2022-11-03 DIAGNOSIS — G89.4 CHRONIC PAIN SYNDROME: ICD-10-CM

## 2022-11-03 DIAGNOSIS — R10.9 GASTRIC PAIN: ICD-10-CM

## 2022-11-03 RX ORDER — SUCRALFATE 1 G/1
TABLET ORAL
Qty: 120 TABLET | Refills: 0 | Status: SHIPPED | OUTPATIENT
Start: 2022-11-03 | End: 2023-07-07

## 2022-11-03 NOTE — TELEPHONE ENCOUNTER
Nancy  Next 5 appointments (look out 90 days)    Nov 16, 2022  1:20 PM  (Arrive by 1:00 PM)  Provider Visit with Damien Mason MD  [facility] ([facility] ) [address] [place] [id number]  [phone]   Dec 01, 2022  3:30 PM  Return Visit with Girish Sauer MD  [facility] ([facility] ) [address] [place] [id number]  [phone]

## 2022-11-04 RX ORDER — OXYCODONE AND ACETAMINOPHEN 5; 325 MG/1; MG/1
TABLET ORAL
Qty: 60 TABLET | Refills: 0 | Status: SHIPPED | OUTPATIENT
Start: 2022-11-04 | End: 2023-01-09

## 2022-11-12 ENCOUNTER — LAB (OUTPATIENT)
Dept: LAB | Facility: CLINIC | Age: 72
End: 2022-11-12
Payer: COMMERCIAL

## 2022-11-12 DIAGNOSIS — C67.8 MALIGNANT NEOPLASM OF OVERLAPPING SITES OF BLADDER (H): ICD-10-CM

## 2022-11-12 LAB
ANION GAP SERPL CALCULATED.3IONS-SCNC: 7 MMOL/L (ref 3–14)
BUN SERPL-MCNC: 27 MG/DL (ref 7–30)
CALCIUM SERPL-MCNC: 9.2 MG/DL (ref 8.5–10.1)
CHLORIDE BLD-SCNC: 108 MMOL/L (ref 94–109)
CO2 SERPL-SCNC: 25 MMOL/L (ref 20–32)
CREAT SERPL-MCNC: 0.9 MG/DL (ref 0.52–1.04)
GFR SERPL CREATININE-BSD FRML MDRD: 67 ML/MIN/1.73M2
GLUCOSE BLD-MCNC: 99 MG/DL (ref 70–99)
POTASSIUM BLD-SCNC: 4.4 MMOL/L (ref 3.4–5.3)
SODIUM SERPL-SCNC: 140 MMOL/L (ref 133–144)

## 2022-11-12 PROCEDURE — 80048 BASIC METABOLIC PNL TOTAL CA: CPT

## 2022-11-12 PROCEDURE — 88112 CYTOPATH CELL ENHANCE TECH: CPT | Performed by: PATHOLOGY

## 2022-11-12 PROCEDURE — 36415 COLL VENOUS BLD VENIPUNCTURE: CPT

## 2022-11-15 LAB
PATH REPORT.COMMENTS IMP SPEC: NORMAL
PATH REPORT.FINAL DX SPEC: NORMAL
PATH REPORT.GROSS SPEC: NORMAL
PATH REPORT.RELEVANT HX SPEC: NORMAL

## 2022-11-16 ENCOUNTER — OFFICE VISIT (OUTPATIENT)
Dept: INTERNAL MEDICINE | Facility: CLINIC | Age: 72
End: 2022-11-16
Payer: COMMERCIAL

## 2022-11-16 VITALS
DIASTOLIC BLOOD PRESSURE: 74 MMHG | OXYGEN SATURATION: 97 % | RESPIRATION RATE: 16 BRPM | SYSTOLIC BLOOD PRESSURE: 120 MMHG | WEIGHT: 139.1 LBS | BODY MASS INDEX: 22.35 KG/M2 | TEMPERATURE: 98.2 F | HEIGHT: 66 IN

## 2022-11-16 DIAGNOSIS — R53.83 FATIGUE, UNSPECIFIED TYPE: Primary | ICD-10-CM

## 2022-11-16 DIAGNOSIS — C50.412 MALIGNANT NEOPLASM OF UPPER-OUTER QUADRANT OF LEFT BREAST IN FEMALE, ESTROGEN RECEPTOR NEGATIVE (H): ICD-10-CM

## 2022-11-16 DIAGNOSIS — M79.10 MYALGIA: ICD-10-CM

## 2022-11-16 DIAGNOSIS — F43.21 ADJUSTMENT DISORDER WITH DEPRESSED MOOD: ICD-10-CM

## 2022-11-16 DIAGNOSIS — Z17.1 MALIGNANT NEOPLASM OF UPPER-OUTER QUADRANT OF LEFT BREAST IN FEMALE, ESTROGEN RECEPTOR NEGATIVE (H): ICD-10-CM

## 2022-11-16 DIAGNOSIS — C67.9 UROTHELIAL CARCINOMA OF BLADDER WITH INVASION OF MUSCLE (H): ICD-10-CM

## 2022-11-16 LAB
CK SERPL-CCNC: 125 U/L (ref 30–225)
ERYTHROCYTE [DISTWIDTH] IN BLOOD BY AUTOMATED COUNT: 14.7 % (ref 10–15)
ERYTHROCYTE [SEDIMENTATION RATE] IN BLOOD BY WESTERGREN METHOD: 14 MM/HR (ref 0–30)
HCT VFR BLD AUTO: 34.9 % (ref 35–47)
HGB BLD-MCNC: 11.8 G/DL (ref 11.7–15.7)
MCH RBC QN AUTO: 34.3 PG (ref 26.5–33)
MCHC RBC AUTO-ENTMCNC: 33.8 G/DL (ref 31.5–36.5)
MCV RBC AUTO: 102 FL (ref 78–100)
PLATELET # BLD AUTO: 279 10E3/UL (ref 150–450)
RBC # BLD AUTO: 3.44 10E6/UL (ref 3.8–5.2)
TSH SERPL DL<=0.005 MIU/L-ACNC: 3.08 MU/L (ref 0.4–4)
WBC # BLD AUTO: 7.8 10E3/UL (ref 4–11)

## 2022-11-16 PROCEDURE — 84443 ASSAY THYROID STIM HORMONE: CPT | Performed by: INTERNAL MEDICINE

## 2022-11-16 PROCEDURE — 85027 COMPLETE CBC AUTOMATED: CPT | Performed by: INTERNAL MEDICINE

## 2022-11-16 PROCEDURE — 82550 ASSAY OF CK (CPK): CPT | Performed by: INTERNAL MEDICINE

## 2022-11-16 PROCEDURE — 99214 OFFICE O/P EST MOD 30 MIN: CPT | Performed by: INTERNAL MEDICINE

## 2022-11-16 PROCEDURE — 85652 RBC SED RATE AUTOMATED: CPT | Performed by: INTERNAL MEDICINE

## 2022-11-16 PROCEDURE — 36415 COLL VENOUS BLD VENIPUNCTURE: CPT | Performed by: INTERNAL MEDICINE

## 2022-11-16 ASSESSMENT — PAIN SCALES - GENERAL: PAINLEVEL: MILD PAIN (2)

## 2022-11-16 NOTE — PROGRESS NOTES
Assessment & Plan   Problem List Items Addressed This Visit     Adjustment disorder with depressed mood    Relevant Orders    Adult Mental Health  Referral    TSH with free T4 reflex    Malignant neoplasm of upper-outer quadrant of left breast in female, estrogen receptor negative (H)    Relevant Orders    TSH with free T4 reflex    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)    Relevant Orders    TSH with free T4 reflex   Other Visit Diagnoses     Fatigue, unspecified type    -  Primary    Relevant Orders    Adult Mental Health  Referral    CK total    CBC with platelets    ESR: Erythrocyte sedimentation rate    TSH with free T4 reflex    Myalgia        Relevant Orders    CK total    CBC with platelets    ESR: Erythrocyte sedimentation rate    TSH with free T4 reflex           Patient is here for another opinion on why she has fatigue.  She has a history of breast cancer in 2016 status postradiation and chemotherapy and a double mastectomy.  She has had a history of a bladder cancer with a radical surgery.  She has a urostomy bag.  The patient denies shortness of breath or chest pain she just feels fatigued not able to do as much ever since her bladder cancer.  She has had lab work-ups.  We will complete another well lab work-up including a CK CBC sed rate and TSH.  I will tell her to hold her atorvastatin for 2 weeks to see if this helps her muscles.    I think she still grieving and having depression after her 2 cancers.  She is not on Wellbutrin anymore but she is on Lexapro she knows she has more trouble in the winter months.  We will refer to counseling.  I    I am also concerned about her substance use.  She is drinking 2 alcoholic drinks 1-1/2 ounces each a day, also taking Xanax a few times a week and taking oxycodone.  I told her this is not a good combination and I would have her reduce for sure the alcohol which will make her more depressed and try to eliminate the  Xanax as well.  Hopefully mental health counseling will help her.    Have to consider if her sed rate was high polymyalgia rheumatica however her last CRP was normal and I would not start her on steroids yet.             No follow-ups on file.    Damien Mason MD  [facility]    Bishop Alcaraz is a 72 year old, presenting for the following health issues:  Fatigue  Sore throat    History of Present Illness       Reason for visit:  Extreme muscle fatique  Symptom onset:  More than a month  Symptoms include:  Muscle aches extreme fatique and weakness chronic sore throat  Symptom intensity:  Severe  Symptom progression:  Staying the same  Had these symptoms before:  No  What makes it worse:  Exercise  What makes it better:  Rest    She eats 0-1 servings of fruits and vegetables daily.She consumes 0 sweetened beverage(s) daily.She exercises with enough effort to increase her heart rate 10 to 19 minutes per day.  She exercises with enough effort to increase her heart rate 3 or less days per week.   She is taking medications regularly.       Breast cancer and treatment in 2016    Bladder cancer June 2021.  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  Everything got worse after the bladder cancer.  Had surgery with bladder and urostomy, hysterectomy and vaginal surgery as well, painful and hard surgery. She says mentally doesn't deal well with it.      Chronic sore throat and had an EGD. Omeprazole bid helps stomach with carafate. Normal EGD.    No congestion, no post nasal drip.    Less appettite since bladder cancer,  says she doesn't eat.      Happy riding her horse but pain and weak.  Taking lexapro but no more wellbutrin. Taking 3 xanax a week for anxiety.   Couple drinks a night, ounce and half whiskey twice a night.     Not shortness of breath, no chest pain. Muscle aches, fatigue.     Review of Systems        "  Objective    /74 (BP Location: Right arm, Patient Position: Sitting, Cuff Size: Adult Regular)   Temp 98.2  F (36.8  C) (Temporal)   Resp 16   Ht 1.686 m (5' 6.38\")   Wt 63.1 kg (139 lb 1.6 oz)   SpO2 97%   BMI 22.20 kg/m    Body mass index is 22.2 kg/m .  Physical Exam   No acute distress, thin  Heart is regular  Lungs are clear  Extremities without any edema.                    "

## 2022-11-17 ENCOUNTER — MYC MEDICAL ADVICE (OUTPATIENT)
Dept: FAMILY MEDICINE | Facility: OTHER | Age: 72
End: 2022-11-17

## 2022-11-17 DIAGNOSIS — C67.9 UROTHELIAL CARCINOMA OF BLADDER WITH INVASION OF MUSCLE (H): ICD-10-CM

## 2022-11-17 DIAGNOSIS — J31.2 CHRONIC SORE THROAT: Primary | ICD-10-CM

## 2022-11-17 DIAGNOSIS — C77.5 SECONDARY AND UNSPECIFIED MALIGNANT NEOPLASM OF INTRAPELVIC LYMPH NODES (H): ICD-10-CM

## 2022-11-17 DIAGNOSIS — G89.4 CHRONIC PAIN SYNDROME: ICD-10-CM

## 2022-11-17 DIAGNOSIS — F43.21 ADJUSTMENT DISORDER WITH DEPRESSED MOOD: ICD-10-CM

## 2022-11-22 ENCOUNTER — VIRTUAL VISIT (OUTPATIENT)
Dept: UROLOGY | Facility: CLINIC | Age: 72
End: 2022-11-22
Payer: COMMERCIAL

## 2022-11-22 DIAGNOSIS — C67.8 MALIGNANT NEOPLASM OF OVERLAPPING SITES OF BLADDER (H): Primary | ICD-10-CM

## 2022-11-22 PROCEDURE — 99213 OFFICE O/P EST LOW 20 MIN: CPT | Mod: 95 | Performed by: UROLOGY

## 2022-11-22 NOTE — LETTER
11/22/2022       RE: Michaela Dorman  [address]  [facility] [address]     Dear Colleague,    Thank you for referring your patient, Michaela Dorman, to the [facility] [place] at [facility]. Please see a copy of my visit note below.    Michaela Dorman is a 72 year old female who is being evaluated via a billable video visit.      How would you like to obtain your AVS? MyChart  If the video visit is dropped, the invitation should be resent by: Text to cell phone: [phone]  Will anyone else be joining your video visit? No     Video Start Time: 3:31 PM    CHIEF COMPLAINT   It was my pleasure to see Micahela Dorman who is a 72 year old female for follow-up of bladder cancer.      HPI  Michaela Dorman is a very pleasant 72 year old female who presents with a history of bladder cancer. She underwent cystectomy with ileal conduit 6/1/2021. Stage [id number] disease.   Had positive margin at urethral margin noted on initial path, but has now had remnant urethra removed and proximal margin resected, with no residual disease. Recovering well from this.     Cr 0.90  Cytology negative    CT chest/abd/pelvis 9/6/2022  IMPRESSION:  1.  Stable postsurgical changes of cystectomy without evidence of  recurrent or metastatic disease in the chest, abdomen or pelvis.  2.  Stable small pulmonary nodules, likely benign. Recommend continued  routine oncologic follow-up.  3.  Stable size of left pelvic cystic lesion, favor benign etiology  but continued attention on follow-up is recommended.    Urethrectomy 10/27/2021  Final Diagnosis   A.  Distal urethra, excision:  - Benign urethral and periurethral glands with mild chronic inflammation and fibrosis  - Negative for malignancy    B.  Distal urethra, proximal margins, excision:  - Benign muscle and connective tissue with mild fibrosis  - Negative for malignancy       Cystectomy with ileal conduit  6/1/2021  FINAL DIAGNOSIS:   C. 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 in the periurethral ducts at   urethral margin   - Pathologic stage: [id number]   - Benign uterus, cervix, vaginal wall, ovaries and fallopian tubes with   physiological changes   - See synoptic report          Allergies:   Oxybutynin         Review of Systems:  From intake questionnaire     Skin: negative  Eyes: negative  Ears/Nose/Throat: negative  Respiratory: No shortness of breath, dyspnea on exertion, cough, or hemoptysis  Cardiovascular: No chest pain or palpitations  Gastrointestinal: negative; no nausea/vomiting, constipation or diarrhea  Genitourinary: as per HPI  Musculoskeletal: negative  Neurologic: negative  Psychiatric: negative  Hematologic/Lymphatic/Immunologic: negative  Endocrine: negative         Physical Exam:     Vitals:  No vitals were obtained today due to virtual visit.    Physical Exam   GENERAL: Healthy, alert and no distress  EYES: Eyes grossly normal to inspection.  No discharge or erythema, or obvious scleral/conjunctival abnormalities.  RESP: No audible wheeze, cough, or visible cyanosis.  No visible retractions or increased work of breathing.    SKIN: Visible skin clear. No significant rash, abnormal pigmentation or lesions.  NEURO: Cranial nerves grossly intact.  Mentation and speech appropriate for age.  PSYCH: Mentation appears normal, affect normal/bright, judgement and insight intact, normal speech and appearance well-groomed.      Outside and Past Medical records:    Review of the result(s) of each unique test - CT, BMP         Assessment and Plan:     72 year old female with stage ypT2N0 bladder cancer s/p radical cystectomy and ileal conduit 6/1/2021. Had remnant urethra removed with no  malignancy noted. No recurrence noted today. Also follows with medical oncology. Creat stable and no hydronephrosis. Overall doing well at this time. Will continue surveillance per medical oncology.     Plan:  - Follow-up 1 year to review CT as ordered per medical oncology team. BMP and cytology as well.    Orders  Orders Placed This Encounter   Procedures     Basic metabolic panel     Video-Visit Details    Type of service:  Video Visit    Video End Time:3:40 PM    Originating Location (pt. Location): Home    Distant Location (provider location):  On-site    Platform used for Video Visit: [employer]    10 minutes spent on the date of the encounter including direct interaction with the patient, performing chart review, history and exam, documentation and further activities as noted above.    Leonardo Robles MD  Urology  [facility]

## 2022-11-22 NOTE — PROGRESS NOTES
Michaela Dorman is a 72 year old female who is being evaluated via a billable video visit.      How would you like to obtain your AVS? [employer]  If the video visit is dropped, the invitation should be resent by: Text to cell phone: [phone]  Will anyone else be joining your video visit? No     Video Start Time: 3:31 PM    CHIEF COMPLAINT   It was my pleasure to see Michaela Dorman who is a 72 year old female for follow-up of bladder cancer.      HPI  Michaela Dorman is a very pleasant 72 year old female who presents with a history of bladder cancer. She underwent cystectomy with ileal conduit 6/1/2021. Stage [id number] disease.   Had positive margin at urethral margin noted on initial path, but has now had remnant urethra removed and proximal margin resected, with no residual disease. Recovering well from this.     Cr 0.90  Cytology negative    CT chest/abd/pelvis 9/6/2022  IMPRESSION:  1.  Stable postsurgical changes of cystectomy without evidence of  recurrent or metastatic disease in the chest, abdomen or pelvis.  2.  Stable small pulmonary nodules, likely benign. Recommend continued  routine oncologic follow-up.  3.  Stable size of left pelvic cystic lesion, favor benign etiology  but continued attention on follow-up is recommended.    Urethrectomy 10/27/2021  Final Diagnosis   A.  Distal urethra, excision:  - Benign urethral and periurethral glands with mild chronic inflammation and fibrosis  - Negative for malignancy    B.  Distal urethra, proximal margins, excision:  - Benign muscle and connective tissue with mild fibrosis  - Negative for malignancy       Cystectomy with ileal conduit 6/1/2021  FINAL DIAGNOSIS:   C. 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 in the periurethral ducts at    urethral margin   - Pathologic stage: [id number]   - Benign uterus, cervix, vaginal wall, ovaries and fallopian tubes with   physiological changes   - See synoptic report          Allergies:   Oxybutynin         Review of Systems:  From intake questionnaire     Skin: negative  Eyes: negative  Ears/Nose/Throat: negative  Respiratory: No shortness of breath, dyspnea on exertion, cough, or hemoptysis  Cardiovascular: No chest pain or palpitations  Gastrointestinal: negative; no nausea/vomiting, constipation or diarrhea  Genitourinary: as per HPI  Musculoskeletal: negative  Neurologic: negative  Psychiatric: negative  Hematologic/Lymphatic/Immunologic: negative  Endocrine: negative         Physical Exam:     Vitals:  No vitals were obtained today due to virtual visit.    Physical Exam   GENERAL: Healthy, alert and no distress  EYES: Eyes grossly normal to inspection.  No discharge or erythema, or obvious scleral/conjunctival abnormalities.  RESP: No audible wheeze, cough, or visible cyanosis.  No visible retractions or increased work of breathing.    SKIN: Visible skin clear. No significant rash, abnormal pigmentation or lesions.  NEURO: Cranial nerves grossly intact.  Mentation and speech appropriate for age.  PSYCH: Mentation appears normal, affect normal/bright, judgement and insight intact, normal speech and appearance well-groomed.      Outside and Past Medical records:    Review of the result(s) of each unique test - CT, BMP         Assessment and Plan:     72 year old female with stage ypT2N0 bladder cancer s/p radical cystectomy and ileal conduit 6/1/2021. Had remnant urethra removed with no malignancy noted. No recurrence noted today. Also follows with medical oncology. Creat stable and no hydronephrosis. Overall doing well at this time. Will continue surveillance per medical oncology.     Plan:  - Follow-up 1 year to review CT as ordered per medical oncology team. BMP and cytology as well.    Orders  Orders  Placed This Encounter   Procedures     Basic metabolic panel     Video-Visit Details    Type of service:  Video Visit    Video End Time:3:40 PM    Originating Location (pt. Location): Home    Distant Location (provider location):  On-site    Platform used for Video Visit: [employer]    10 minutes spent on the date of the encounter including direct interaction with the patient, performing chart review, history and exam, documentation and further activities as noted above.    Leonardo Robles MD  Urology  [facility]

## 2022-11-28 ENCOUNTER — LAB (OUTPATIENT)
Dept: LAB | Facility: CLINIC | Age: 72
End: 2022-11-28
Payer: COMMERCIAL

## 2022-11-28 ENCOUNTER — HOSPITAL ENCOUNTER (OUTPATIENT)
Dept: CT IMAGING | Facility: CLINIC | Age: 72
Discharge: HOME | End: 2022-11-28
Attending: INTERNAL MEDICINE | Admitting: INTERNAL MEDICINE
Payer: MEDICARE

## 2022-11-28 DIAGNOSIS — C67.8 MALIGNANT NEOPLASM OF OVERLAPPING SITES OF BLADDER (H): ICD-10-CM

## 2022-11-28 DIAGNOSIS — D50.8 OTHER IRON DEFICIENCY ANEMIA: ICD-10-CM

## 2022-11-28 LAB
ALBUMIN SERPL-MCNC: 4.1 G/DL (ref 3.4–5)
ALP SERPL-CCNC: 45 U/L (ref 40–150)
ALT SERPL W P-5'-P-CCNC: 21 U/L (ref 0–50)
ANION GAP SERPL CALCULATED.3IONS-SCNC: 4 MMOL/L (ref 3–14)
AST SERPL W P-5'-P-CCNC: 17 U/L (ref 0–45)
BASOPHILS # BLD AUTO: 0.1 10E3/UL (ref 0–0.2)
BASOPHILS NFR BLD AUTO: 1 %
BILIRUB SERPL-MCNC: 0.5 MG/DL (ref 0.2–1.3)
BUN SERPL-MCNC: 28 MG/DL (ref 7–30)
CALCIUM SERPL-MCNC: 9.8 MG/DL (ref 8.5–10.1)
CHLORIDE BLD-SCNC: 108 MMOL/L (ref 94–109)
CO2 SERPL-SCNC: 26 MMOL/L (ref 20–32)
CREAT SERPL-MCNC: 1.02 MG/DL (ref 0.52–1.04)
EOSINOPHIL # BLD AUTO: 0.3 10E3/UL (ref 0–0.7)
EOSINOPHIL NFR BLD AUTO: 4 %
ERYTHROCYTE [DISTWIDTH] IN BLOOD BY AUTOMATED COUNT: 14.6 % (ref 10–15)
FERRITIN SERPL-MCNC: 90 NG/ML (ref 8–252)
GFR SERPL CREATININE-BSD FRML MDRD: 58 ML/MIN/1.73M2
GLUCOSE BLD-MCNC: 90 MG/DL (ref 70–99)
HCT VFR BLD AUTO: 36.1 % (ref 35–47)
HGB BLD-MCNC: 11.9 G/DL (ref 11.7–15.7)
IMM GRANULOCYTES # BLD: 0 10E3/UL
IMM GRANULOCYTES NFR BLD: 0 %
LYMPHOCYTES # BLD AUTO: 1.7 10E3/UL (ref 0.8–5.3)
LYMPHOCYTES NFR BLD AUTO: 19 %
MCH RBC QN AUTO: 33.4 PG (ref 26.5–33)
MCHC RBC AUTO-ENTMCNC: 33 G/DL (ref 31.5–36.5)
MCV RBC AUTO: 101 FL (ref 78–100)
MONOCYTES # BLD AUTO: 0.5 10E3/UL (ref 0–1.3)
MONOCYTES NFR BLD AUTO: 6 %
NEUTROPHILS # BLD AUTO: 6.2 10E3/UL (ref 1.6–8.3)
NEUTROPHILS NFR BLD AUTO: 70 %
NRBC # BLD AUTO: 0 10E3/UL
NRBC BLD AUTO-RTO: 0 /100
PLATELET # BLD AUTO: 294 10E3/UL (ref 150–450)
POTASSIUM BLD-SCNC: 4.8 MMOL/L (ref 3.4–5.3)
PROT SERPL-MCNC: 7.7 G/DL (ref 6.8–8.8)
RBC # BLD AUTO: 3.56 10E6/UL (ref 3.8–5.2)
SODIUM SERPL-SCNC: 138 MMOL/L (ref 133–144)
WBC # BLD AUTO: 8.9 10E3/UL (ref 4–11)

## 2022-11-28 PROCEDURE — 80053 COMPREHEN METABOLIC PANEL: CPT | Performed by: INTERNAL MEDICINE

## 2022-11-28 PROCEDURE — G1010 CDSM STANSON: HCPCS

## 2022-11-28 PROCEDURE — 85025 COMPLETE CBC W/AUTO DIFF WBC: CPT | Performed by: INTERNAL MEDICINE

## 2022-11-28 PROCEDURE — 74177 CT ABD & PELVIS W/CONTRAST: CPT | Mod: MG

## 2022-11-28 PROCEDURE — 82728 ASSAY OF FERRITIN: CPT | Performed by: INTERNAL MEDICINE

## 2022-11-28 PROCEDURE — 250N000011 HC RX IP 250 OP 636: Performed by: INTERNAL MEDICINE

## 2022-11-28 PROCEDURE — 250N000009 HC RX 250: Performed by: INTERNAL MEDICINE

## 2022-11-28 PROCEDURE — 36415 COLL VENOUS BLD VENIPUNCTURE: CPT

## 2022-11-28 RX ORDER — IOPAMIDOL 755 MG/ML
500 INJECTION, SOLUTION INTRAVASCULAR ONCE
Status: COMPLETED | OUTPATIENT
Start: 2022-11-28 | End: 2022-11-28

## 2022-11-28 RX ADMIN — IOPAMIDOL 68 ML: 755 INJECTION, SOLUTION INTRAVENOUS at 10:52

## 2022-11-28 RX ADMIN — SODIUM CHLORIDE 60 ML: 9 INJECTION, SOLUTION INTRAVENOUS at 10:51

## 2022-12-01 ENCOUNTER — ONCOLOGY VISIT (OUTPATIENT)
Dept: ONCOLOGY | Facility: CLINIC | Age: 72
End: 2022-12-01
Payer: COMMERCIAL

## 2022-12-01 ENCOUNTER — INFUSION THERAPY VISIT (OUTPATIENT)
Dept: INFUSION THERAPY | Facility: CLINIC | Age: 72
End: 2022-12-01
Attending: INTERNAL MEDICINE
Payer: MEDICARE

## 2022-12-01 VITALS
HEART RATE: 91 BPM | DIASTOLIC BLOOD PRESSURE: 72 MMHG | HEIGHT: 66 IN | BODY MASS INDEX: 22.5 KG/M2 | TEMPERATURE: 97.6 F | WEIGHT: 140 LBS | SYSTOLIC BLOOD PRESSURE: 130 MMHG | OXYGEN SATURATION: 97 % | RESPIRATION RATE: 16 BRPM

## 2022-12-01 DIAGNOSIS — C77.5 SECONDARY AND UNSPECIFIED MALIGNANT NEOPLASM OF INTRAPELVIC LYMPH NODES (H): ICD-10-CM

## 2022-12-01 DIAGNOSIS — C50.412 MALIGNANT NEOPLASM OF UPPER-OUTER QUADRANT OF LEFT BREAST IN FEMALE, ESTROGEN RECEPTOR NEGATIVE (H): Primary | ICD-10-CM

## 2022-12-01 DIAGNOSIS — E03.9 ACQUIRED HYPOTHYROIDISM: ICD-10-CM

## 2022-12-01 DIAGNOSIS — R91.8 PULMONARY NODULES: ICD-10-CM

## 2022-12-01 DIAGNOSIS — C67.8 MALIGNANT NEOPLASM OF OVERLAPPING SITES OF BLADDER (H): Primary | ICD-10-CM

## 2022-12-01 DIAGNOSIS — C67.9 UROTHELIAL CARCINOMA OF BLADDER WITH INVASION OF MUSCLE (H): ICD-10-CM

## 2022-12-01 DIAGNOSIS — Z17.1 MALIGNANT NEOPLASM OF UPPER-OUTER QUADRANT OF LEFT BREAST IN FEMALE, ESTROGEN RECEPTOR NEGATIVE (H): Primary | ICD-10-CM

## 2022-12-01 PROCEDURE — 96523 IRRIG DRUG DELIVERY DEVICE: CPT

## 2022-12-01 PROCEDURE — 99214 OFFICE O/P EST MOD 30 MIN: CPT | Performed by: INTERNAL MEDICINE

## 2022-12-01 PROCEDURE — 250N000011 HC RX IP 250 OP 636: Performed by: INTERNAL MEDICINE

## 2022-12-01 RX ORDER — HEPARIN SODIUM (PORCINE) LOCK FLUSH IV SOLN 100 UNIT/ML 100 UNIT/ML
5 SOLUTION INTRAVENOUS
Status: DISCONTINUED | OUTPATIENT
Start: 2022-12-01 | End: 2022-12-01 | Stop reason: HOSPADM

## 2022-12-01 RX ORDER — HEPARIN SODIUM (PORCINE) LOCK FLUSH IV SOLN 100 UNIT/ML 100 UNIT/ML
5 SOLUTION INTRAVENOUS
Status: CANCELLED | OUTPATIENT
Start: 2022-12-01

## 2022-12-01 RX ADMIN — Medication 5 ML: at 15:14

## 2022-12-01 ASSESSMENT — PAIN SCALES - GENERAL: PAINLEVEL: MILD PAIN (3)

## 2022-12-01 NOTE — LETTER
2022         RE: Michaela Dorman  [address]  [facility] [address]        Dear Colleague,    Thank you for referring your patient, Michaela Dorman, to the [facility] [place]. Please see a copy of my visit note below.    [facility] Hematology / Oncology  Progress Note  Name: Michaela Dorman  :  1950    MRN:  [id number]    --------------------    Assessment / Plan:  Left-sided ER-, HER2+ breast CA:  # Sep 2016 Presented w/ left axillary mass; staging multicentric T4 lesion w/ geraldine involvement; biopsy w/ ER-, HER2+ breast cancer  # Sep 2016 - 2017 Neoadjuvant AC x 4 cycles/TH x 4 cycles.  # 2017 Bilateral mastectomy no with geraldine evaluation; complete path CR from invasive cancer; 7 mm DCIS residual.  # 2017 - May 2017 Adjuvant radiation to left chestwall / axilla.  # 2017 - Dec 2017 Adjuvant Herceptin.    Bladder cancer:([id number])  # Oct 2020 Presented w/ dysuria.  # 2021 Cytoscopy w/ muscle-invasive high grade urothelial cancer.  # Mar 2021 TURBT.  # 2021 Neoadjuvant cisplatin/gem split dose complicated by TAMIR.  # May 2021 Neoadjuvant carboplatin/gem.  # 2021 Radical cystectomy w/ ileal conduit; path high grade urothelial carcinoma muscle invasive; margins negs; nodes negative.  # 2021 - 2022 Adjuvant opdivo; discontinued 2/2 headaches.    Clinically, Kristi remains well and without complaints.  Radiographically we note a new 3 mm right lower lobe nodule that bears very close observation.  We reviewed that right now based on location and size, it would be difficult to attempt a tissue biopsy either endoscopically or percutaneously.  We agreed to keep an eye on it.  Breast cancer would be a little unusual given that she is 5 years out but she did have some residual disease.  Bladder cancer would make more sense given the timing and early appearance after stopping immunotherapy.  If it truly ends up being oligometastatic and  "this is the only lesion, we may look at stereotactic radiation.  Discussions of additional Herceptin or Opdivo would need to be weighed against risks and benefits.  She had a tough time with Opdivo from a meningitis standpoint that was responsive to prednisone even though it is an unusual side effect.  Evidence of being lung cancer, we will also need to coordinate care with full staging and likely involve our CVTS surgeons as well as radiation oncology colleagues.  Return to clinic 3 months with labs as well as a CT scan.    Girish Sauer MD    --------------------    Interval History:  Michaela returns for follow up bladder and breast cancer.  All in all, Kristi continues to feel great.  No recurrence of headaches.  She has been off prednisone and feeling well.  No rash.  Stable appetite, energy and weight.    --------------------    Physical Exam:  VS: /72   Pulse 91   Temp 97.6  F (36.4  C) (Temporal)   Resp 16   Ht 1.686 m (5' 6.38\")   Wt 63.5 kg (140 lb)   SpO2 97%   BMI 22.34 kg/m    Gen: Well-appearing    Labs / Imaging / Path:  We reviewed CBC, CMP.  We personally reviewed her CT CAP.      Again, thank you for allowing me to participate in the care of your patient.        Sincerely,        Girish Sauer MD    "

## 2022-12-01 NOTE — PROGRESS NOTES
Infusion Nursing Note:  Michaela Dorman presents today for port flush.    Patient seen by provider today: Yes: DR. Sauer   present during visit today: Not Applicable.    Note: Patient has no complaints. Wondering if she should get port removed. She will discuss with DR. Sauer at her appt this afternoon. .    Intravenous Access:  Implanted Port.    Treatment Conditions:  Not Applicable.    Post Infusion Assessment:  Blood return noted pre and post infusion.  Access discontinued per protocol.     Discharge Plan:   Patient discharged in stable condition accompanied by: self.  Departure Mode: Ambulatory.      Tami Knowles RN

## 2022-12-01 NOTE — PROGRESS NOTES
[facility] Hematology / Oncology  Progress Note  Name: Michaela Dorman  :  1950    MRN:  [id number]    --------------------    Assessment / Plan:  Left-sided ER-, HER2+ breast CA:  # Sep 2016 Presented w/ left axillary mass; staging multicentric T4 lesion w/ geraldine involvement; biopsy w/ ER-, HER2+ breast cancer  # Sep 2016 - 2017 Neoadjuvant AC x 4 cycles/TH x 4 cycles.  # 2017 Bilateral mastectomy no with geraldine evaluation; complete path CR from invasive cancer; 7 mm DCIS residual.  # 2017 - May 2017 Adjuvant radiation to left chestwall / axilla.  # 2017 - Dec 2017 Adjuvant Herceptin.    Bladder cancer:([id number])  # Oct 2020 Presented w/ dysuria.  # 2021 Cytoscopy w/ muscle-invasive high grade urothelial cancer.  # Mar 2021 TURBT.  # 2021 Neoadjuvant cisplatin/gem split dose complicated by TAMIR.  # May 2021 Neoadjuvant carboplatin/gem.  # 2021 Radical cystectomy w/ ileal conduit; path high grade urothelial carcinoma muscle invasive; margins negs; nodes negative.  # 2021 - 2022 Adjuvant opdivo; discontinued 2/2 headaches.    Clinically, Kristi remains well and without complaints.  Radiographically we note a new 3 mm right lower lobe nodule that bears very close observation.  We reviewed that right now based on location and size, it would be difficult to attempt a tissue biopsy either endoscopically or percutaneously.  We agreed to keep an eye on it.  Breast cancer would be a little unusual given that she is 5 years out but she did have some residual disease.  Bladder cancer would make more sense given the timing and early appearance after stopping immunotherapy.  If it truly ends up being oligometastatic and this is the only lesion, we may look at stereotactic radiation.  Discussions of additional Herceptin or Opdivo would need to be weighed against risks and benefits.  She had a tough time with Opdivo from a meningitis standpoint that was responsive to prednisone  "even though it is an unusual side effect.  Evidence of being lung cancer, we will also need to coordinate care with full staging and likely involve our CVTS surgeons as well as radiation oncology colleagues.  Return to clinic 3 months with labs as well as a CT scan.    Girish Sauer MD    --------------------    Interval History:  Michaela returns for follow up bladder and breast cancer.  All in all, Kristi continues to feel great.  No recurrence of headaches.  She has been off prednisone and feeling well.  No rash.  Stable appetite, energy and weight.    --------------------    Physical Exam:  VS: /72   Pulse 91   Temp 97.6  F (36.4  C) (Temporal)   Resp 16   Ht 1.686 m (5' 6.38\")   Wt 63.5 kg (140 lb)   SpO2 97%   BMI 22.34 kg/m    Gen: Well-appearing    Labs / Imaging / Path:  We reviewed CBC, CMP.  We personally reviewed her CT CAP.  "

## 2022-12-01 NOTE — NURSING NOTE
Chief Complaint   Patient presents with     Oncology Clinic Visit     bladder     Results     Labs- 11/28

## 2022-12-07 ENCOUNTER — MYC MEDICAL ADVICE (OUTPATIENT)
Dept: ONCOLOGY | Facility: CLINIC | Age: 72
End: 2022-12-07

## 2022-12-09 DIAGNOSIS — F43.21 ADJUSTMENT DISORDER WITH DEPRESSED MOOD: ICD-10-CM

## 2022-12-09 DIAGNOSIS — F41.9 ANXIETY: ICD-10-CM

## 2022-12-09 RX ORDER — ESCITALOPRAM OXALATE 20 MG/1
20 TABLET ORAL DAILY
Qty: 90 TABLET | Refills: 3 | Status: SHIPPED | OUTPATIENT
Start: 2022-12-09 | End: 2024-01-05

## 2022-12-26 ENCOUNTER — MYC MEDICAL ADVICE (OUTPATIENT)
Dept: FAMILY MEDICINE | Facility: OTHER | Age: 72
End: 2022-12-26

## 2022-12-30 DIAGNOSIS — C50.412 MALIGNANT NEOPLASM OF UPPER-OUTER QUADRANT OF LEFT BREAST IN FEMALE, ESTROGEN RECEPTOR NEGATIVE (H): ICD-10-CM

## 2022-12-30 DIAGNOSIS — Z17.1 MALIGNANT NEOPLASM OF UPPER-OUTER QUADRANT OF LEFT BREAST IN FEMALE, ESTROGEN RECEPTOR NEGATIVE (H): ICD-10-CM

## 2022-12-31 DIAGNOSIS — C67.9 UROTHELIAL CARCINOMA OF BLADDER (H): ICD-10-CM

## 2023-01-02 RX ORDER — DOCUSATE SODIUM 50MG AND SENNOSIDES 8.6MG 8.6; 5 MG/1; MG/1
TABLET, FILM COATED ORAL
Qty: 100 TABLET | Refills: 0 | Status: SHIPPED | OUTPATIENT
Start: 2023-01-02 | End: 2023-03-30

## 2023-01-02 NOTE — TELEPHONE ENCOUNTER
I saw her for a second opinion. Concern for alcohol and xanax.  I would limit her use but appears she sees you regularly.

## 2023-01-04 RX ORDER — LORAZEPAM 0.5 MG/1
TABLET ORAL
Qty: 30 TABLET | Refills: 0 | OUTPATIENT
Start: 2023-01-04

## 2023-01-07 DIAGNOSIS — G89.4 CHRONIC PAIN SYNDROME: ICD-10-CM

## 2023-01-07 DIAGNOSIS — E03.4 HYPOTHYROIDISM DUE TO ACQUIRED ATROPHY OF THYROID: ICD-10-CM

## 2023-01-09 ENCOUNTER — MYC REFILL (OUTPATIENT)
Dept: FAMILY MEDICINE | Facility: OTHER | Age: 73
End: 2023-01-09

## 2023-01-09 DIAGNOSIS — G89.4 CHRONIC PAIN SYNDROME: ICD-10-CM

## 2023-01-09 DIAGNOSIS — E03.4 HYPOTHYROIDISM DUE TO ACQUIRED ATROPHY OF THYROID: ICD-10-CM

## 2023-01-09 RX ORDER — OXYCODONE AND ACETAMINOPHEN 5; 325 MG/1; MG/1
1 TABLET ORAL EVERY 6 HOURS PRN
Qty: 45 TABLET | Refills: 0 | Status: SHIPPED | OUTPATIENT
Start: 2023-01-09 | End: 2023-03-20

## 2023-01-09 RX ORDER — OXYCODONE AND ACETAMINOPHEN 5; 325 MG/1; MG/1
TABLET ORAL
Qty: 60 TABLET | Refills: 0 | Status: SHIPPED | OUTPATIENT
Start: 2023-01-09 | End: 2023-10-29

## 2023-01-09 RX ORDER — LEVOTHYROXINE SODIUM 25 UG/1
25 TABLET ORAL DAILY
Qty: 90 TABLET | Refills: 0 | OUTPATIENT
Start: 2023-01-09

## 2023-01-09 RX ORDER — LEVOTHYROXINE SODIUM 25 UG/1
TABLET ORAL
Qty: 90 TABLET | Refills: 0 | Status: SHIPPED | OUTPATIENT
Start: 2023-01-09 | End: 2023-03-27

## 2023-01-09 NOTE — TELEPHONE ENCOUNTER
Please inform Lissa that I am going to provide a little less in terms of quantity.  I would like for her to make these last as long as possible, trying to wean off eventually.    Girish Sauer MD.

## 2023-01-10 DIAGNOSIS — C67.9 UROTHELIAL CARCINOMA OF BLADDER WITH INVASION OF MUSCLE (H): Primary | ICD-10-CM

## 2023-01-10 RX ORDER — LORAZEPAM 0.5 MG/1
0.5 TABLET ORAL EVERY 4 HOURS PRN
COMMUNITY
End: 2023-01-10

## 2023-01-10 NOTE — TELEPHONE ENCOUNTER
SUBJECTIVE/OBJECTIVE:                                                    Refill request receive for:  Lorazepam    Last refill per fax: 10/17/22  Date of LOV r/t Medication: 12/1/22  Future appt scheduled? Yes: 3/9/23   Date faxed to clinic: 1/9/23    PLAN:                                                      Sent to provider to advise.

## 2023-01-10 NOTE — TELEPHONE ENCOUNTER
Patient called and LVM to return call to clinic regarding refill request and recommendations per Dr. Sauer:  Waiting callback.  Noemi Garcia RNCC

## 2023-01-11 NOTE — TELEPHONE ENCOUNTER
Spoke with patient regarding Rx sent per Dr. Sauer. Patient verbalized understanding, no further questions or concerns.  Noemi Garcia RNCC

## 2023-01-16 RX ORDER — LORAZEPAM 0.5 MG/1
0.5 TABLET ORAL EVERY 4 HOURS PRN
Qty: 30 TABLET | Refills: 3 | Status: SHIPPED | OUTPATIENT
Start: 2023-01-16 | End: 2023-07-24

## 2023-01-20 ENCOUNTER — MYC MEDICAL ADVICE (OUTPATIENT)
Dept: FAMILY MEDICINE | Facility: OTHER | Age: 73
End: 2023-01-20
Payer: COMMERCIAL

## 2023-01-20 NOTE — TELEPHONE ENCOUNTER
LORazepam (ATIVAN) 0.5 MG tablet 30 tablet 3 1/16/2023  No   Sig - Route: Take 1 tablet (0.5 mg) by mouth every 4 hours as needed for anxiety, nausea or sleep - Oral   Sent to pharmacy as: LORazepam 0.5 MG Oral Tablet (ATIVAN)   Class: E-Prescribe   Order: [id number]   E-Prescribing Status: Receipt confirmed by pharmacy (1/18/2023  2:01 PM CST)   Prior authorization: Approved     Printout Tracking    External Result Report     Pharmacy    [id number] - [place], [place] - [address] [place] [address]       Responded to [employer].    Rachael Roca, MARY GRACEN, RN, PHN  Registered Nurse-Clinic Triage  [facility]  1/20/2023 at 1:45 PM

## 2023-02-10 ENCOUNTER — MEDICAL CORRESPONDENCE (OUTPATIENT)
Dept: HEALTH INFORMATION MANAGEMENT | Facility: CLINIC | Age: 73
End: 2023-02-10

## 2023-02-13 ENCOUNTER — TELEPHONE (OUTPATIENT)
Dept: FAMILY MEDICINE | Facility: OTHER | Age: 73
End: 2023-02-13
Payer: COMMERCIAL

## 2023-02-13 NOTE — TELEPHONE ENCOUNTER
Forms/Letter Request    Type of form/letter: [employer]    Have you been seen for this request: N/A    Do we have the form/letter: Yes: Form placed in Family Practice bin    When is form/letter needed by: unknown    How would you like the form/letter returned: Fax    Fax [phone]

## 2023-02-13 NOTE — TELEPHONE ENCOUNTER
Completed forms faxed back to [employer] at .     Confirmed delivery via [employer]. Forms sent to scanning.         Emiliana Lomeli      [facility]

## 2023-03-07 ENCOUNTER — INFUSION THERAPY VISIT (OUTPATIENT)
Dept: INFUSION THERAPY | Facility: CLINIC | Age: 73
End: 2023-03-07
Attending: INTERNAL MEDICINE
Payer: MEDICARE

## 2023-03-07 ENCOUNTER — HOSPITAL ENCOUNTER (OUTPATIENT)
Dept: CT IMAGING | Facility: CLINIC | Age: 73
Discharge: HOME | End: 2023-03-07
Attending: INTERNAL MEDICINE
Payer: MEDICARE

## 2023-03-07 DIAGNOSIS — R91.8 PULMONARY NODULES: ICD-10-CM

## 2023-03-07 DIAGNOSIS — Z17.1 MALIGNANT NEOPLASM OF UPPER-OUTER QUADRANT OF LEFT BREAST IN FEMALE, ESTROGEN RECEPTOR NEGATIVE (H): ICD-10-CM

## 2023-03-07 DIAGNOSIS — C67.9 UROTHELIAL CARCINOMA OF BLADDER WITH INVASION OF MUSCLE (H): Primary | ICD-10-CM

## 2023-03-07 DIAGNOSIS — C77.5 SECONDARY AND UNSPECIFIED MALIGNANT NEOPLASM OF INTRAPELVIC LYMPH NODES (H): ICD-10-CM

## 2023-03-07 DIAGNOSIS — C67.9 UROTHELIAL CARCINOMA OF BLADDER WITH INVASION OF MUSCLE (H): ICD-10-CM

## 2023-03-07 DIAGNOSIS — C50.412 MALIGNANT NEOPLASM OF UPPER-OUTER QUADRANT OF LEFT BREAST IN FEMALE, ESTROGEN RECEPTOR NEGATIVE (H): ICD-10-CM

## 2023-03-07 LAB
CREAT BLD-MCNC: 1.2 MG/DL (ref 0.5–1)
GFR SERPL CREATININE-BSD FRML MDRD: 48 ML/MIN/1.73M2

## 2023-03-07 PROCEDURE — 250N000011 HC RX IP 250 OP 636: Performed by: INTERNAL MEDICINE

## 2023-03-07 PROCEDURE — 82565 ASSAY OF CREATININE: CPT

## 2023-03-07 PROCEDURE — 74177 CT ABD & PELVIS W/CONTRAST: CPT | Mod: MG

## 2023-03-07 PROCEDURE — G1010 CDSM STANSON: HCPCS

## 2023-03-07 PROCEDURE — 250N000009 HC RX 250: Performed by: INTERNAL MEDICINE

## 2023-03-07 RX ORDER — IOPAMIDOL 755 MG/ML
500 INJECTION, SOLUTION INTRAVASCULAR ONCE
Status: COMPLETED | OUTPATIENT
Start: 2023-03-07 | End: 2023-03-07

## 2023-03-07 RX ORDER — HEPARIN SODIUM (PORCINE) LOCK FLUSH IV SOLN 100 UNIT/ML 100 UNIT/ML
5 SOLUTION INTRAVENOUS
Status: DISCONTINUED | OUTPATIENT
Start: 2023-03-07 | End: 2023-03-07 | Stop reason: HOSPADM

## 2023-03-07 RX ORDER — HEPARIN SODIUM (PORCINE) LOCK FLUSH IV SOLN 100 UNIT/ML 100 UNIT/ML
5 SOLUTION INTRAVENOUS
Status: CANCELLED | OUTPATIENT
Start: 2023-03-07

## 2023-03-07 RX ADMIN — IOPAMIDOL 70 ML: 755 INJECTION, SOLUTION INTRAVENOUS at 09:35

## 2023-03-07 RX ADMIN — SODIUM CHLORIDE 60 ML: 9 INJECTION, SOLUTION INTRAVENOUS at 09:34

## 2023-03-07 RX ADMIN — Medication 5 ML: at 09:48

## 2023-03-07 NOTE — PROGRESS NOTES
Infusion Nursing Note:  Michaela Dorman presents today for Port access for CT.    Patient seen by provider today: No   present during visit today: Not Applicable.    Note: N/A.    Intravenous Access:  Implanted Port.  Lab draw site R chest wall-Creatinine level drawn, Needle type Power 3/4in, Gauge 19.  Labs drawn without difficulty.  Following CT scan, good blood return, flushes easily. Site WDL.  Deaccessed with Heparin per protocol.      Discharge Plan:   AVS to patient via MYCHART.  Patient will return in 6 wks for Port flush at next appointment.   Patient discharged in stable condition accompanied by: self.  Departure Mode: Ambulatory.      Amy Renee RN

## 2023-03-09 ENCOUNTER — VIRTUAL VISIT (OUTPATIENT)
Dept: ONCOLOGY | Facility: CLINIC | Age: 73
End: 2023-03-09
Payer: COMMERCIAL

## 2023-03-09 DIAGNOSIS — C67.9 UROTHELIAL CARCINOMA OF BLADDER WITH INVASION OF MUSCLE (H): Primary | ICD-10-CM

## 2023-03-09 DIAGNOSIS — G89.29 CHRONIC NONINTRACTABLE HEADACHE, UNSPECIFIED HEADACHE TYPE: ICD-10-CM

## 2023-03-09 DIAGNOSIS — C50.412 MALIGNANT NEOPLASM OF UPPER-OUTER QUADRANT OF LEFT BREAST IN FEMALE, ESTROGEN RECEPTOR NEGATIVE (H): ICD-10-CM

## 2023-03-09 DIAGNOSIS — Z17.1 MALIGNANT NEOPLASM OF UPPER-OUTER QUADRANT OF LEFT BREAST IN FEMALE, ESTROGEN RECEPTOR NEGATIVE (H): ICD-10-CM

## 2023-03-09 DIAGNOSIS — R51.9 CHRONIC NONINTRACTABLE HEADACHE, UNSPECIFIED HEADACHE TYPE: ICD-10-CM

## 2023-03-09 DIAGNOSIS — E03.2 HYPOTHYROIDISM DUE TO MEDICATION: ICD-10-CM

## 2023-03-09 DIAGNOSIS — R91.8 PULMONARY NODULES: ICD-10-CM

## 2023-03-09 PROCEDURE — 99213 OFFICE O/P EST LOW 20 MIN: CPT | Mod: VID | Performed by: INTERNAL MEDICINE

## 2023-03-09 NOTE — PROGRESS NOTES
[facility] Hematology / Oncology  Progress Note  Name: Michaela Dorman  :  1950    MRN:  [id number]    --------------------    Assessment / Plan:  Left-sided ER-, HER2+ breast CA:  # Sep 2016 Presented w/ left axillary mass; staging multicentric T4 lesion w/ geraldine involvement; biopsy w/ ER-, HER2+ breast cancer  # Sep 2016 - 2017 Neoadjuvant AC x 4 cycles/TH x 4 cycles.  # 2017 Bilateral mastectomy no with geraldine evaluation; complete path CR from invasive cancer; 7 mm DCIS residual.  # 2017 - May 2017 Adjuvant radiation to left chestwall / axilla.  # 2017 - Dec 2017 Adjuvant Herceptin.    Bladder cancer:([id number])  # Oct 2020 Presented w/ dysuria.  # 2021 Cytoscopy w/ muscle-invasive high grade urothelial cancer.  # Mar 2021 TURBT.  # 2021 Neoadjuvant cisplatin/gem split dose complicated by TAMIR.  # May 2021 Neoadjuvant carboplatin/gem.  # 2021 Radical cystectomy w/ ileal conduit; path high grade urothelial carcinoma muscle invasive; margins negs; nodes negative.  # 2021 - 2022 Adjuvant opdivo; discontinued 2/2 headaches.    Clinically, Michaela remains well from a breast and bladder cancer standpoint.  Radiographically, she remains in a complete remission.  Her immunotherapy headaches (meningitic nature) have dissipated with time she is no longer requiring prednisone.  Lung nodules appear stable; 3 mm right lower lobe nodule resolved likely due to infectious etiology.  Return to clinic 4 months with labs and scans.    Girish Sauer MD    --------------------    Interval History:  Michaela returns for follow up bladder and breast cancer.\All in all, she remains quite well.  No concerns from a breast cancer or bladder cancer standpoint.  No rash, nausea, vomiting, diarrhea from immunotherapy.  Headaches remain mild and slight if she has not been requiring prednisone.    --------------------    Physical Exam:  Video visit.    Labs / Imaging / Path:  We reviewed  CBC, CMP.  We personally reviewed her CT CAP.    Video Visit:  Michaela is a 72 year old female who is being evaluated via a billable video visit.  }    Video start time: 10:35 AM  Video end time: 10:49 AM    Provider location: Off-site  Patient location: Home    Mode of transmission: [employer] / "[employer].

## 2023-03-09 NOTE — LETTER
3/9/2023         RE: Michaela Dorman  [address]  [facility] [address]        Dear Colleague,    Thank you for referring your patient, Michaela Dorman, to the [facility] [place]. Please see a copy of my visit note below.    [facility] Hematology / Oncology  Progress Note  Name: Michaela Dorman  :  1950    MRN:  [id number]    --------------------    Assessment / Plan:  Left-sided ER-, HER2+ breast CA:  # Sep 2016 Presented w/ left axillary mass; staging multicentric T4 lesion w/ geraldine involvement; biopsy w/ ER-, HER2+ breast cancer  # Sep 2016 - 2017 Neoadjuvant AC x 4 cycles/TH x 4 cycles.  # 2017 Bilateral mastectomy no with geraldine evaluation; complete path CR from invasive cancer; 7 mm DCIS residual.  # 2017 - May 2017 Adjuvant radiation to left chestwall / axilla.  # 2017 - Dec 2017 Adjuvant Herceptin.    Bladder cancer:([id number])  # Oct 2020 Presented w/ dysuria.  # 2021 Cytoscopy w/ muscle-invasive high grade urothelial cancer.  # Mar 2021 TURBT.  # 2021 Neoadjuvant cisplatin/gem split dose complicated by TAMIR.  # May 2021 Neoadjuvant carboplatin/gem.  # 2021 Radical cystectomy w/ ileal conduit; path high grade urothelial carcinoma muscle invasive; margins negs; nodes negative.  # 2021 - 2022 Adjuvant opdivo; discontinued 2/2 headaches.    Clinically, Michaela remains well from a breast and bladder cancer standpoint.  Radiographically, she remains in a complete remission.  Her immunotherapy headaches (meningitic nature) have dissipated with time she is no longer requiring prednisone.  Lung nodules appear stable; 3 mm right lower lobe nodule resolved likely due to infectious etiology.  Return to clinic 4 months with labs and scans.    Girish Sauer MD    --------------------    Interval History:  Michaela returns for follow up bladder and breast cancer.\All in all, she remains quite well.  No concerns from a breast cancer  or bladder cancer standpoint.  No rash, nausea, vomiting, diarrhea from immunotherapy.  Headaches remain mild and slight if she has not been requiring prednisone.    --------------------    Physical Exam:  Video visit.    Labs / Imaging / Path:  We reviewed CBC, CMP.  We personally reviewed her CT CAP.    Video Visit:  Michaela is a 72 year old female who is being evaluated via a billable video visit.  }    Video start time: 10:35 AM  Video end time: 10:49 AM    Provider location: Off-site  Patient location: Home    Mode of transmission: [employer] / [employer].        Again, thank you for allowing me to participate in the care of your patient.        Sincerely,        Girish Sauer MD

## 2023-03-14 ENCOUNTER — TELEPHONE (OUTPATIENT)
Dept: ONCOLOGY | Facility: CLINIC | Age: 73
End: 2023-03-14
Payer: COMMERCIAL

## 2023-03-14 NOTE — TELEPHONE ENCOUNTER
There are 2 encounters circulating about this. [employer] message encounter has been sent to [facility] clinic. Closing this encounter.

## 2023-03-14 NOTE — TELEPHONE ENCOUNTER
[facility] Call Center    Phone Message    May a detailed message be left on voicemail: yes     Reason for Call: Other: pt calling and her stoma is leaking, please advise with her     Action Taken: Other: urology    Travel Screening: Not Applicable

## 2023-03-15 ENCOUNTER — TELEPHONE (OUTPATIENT)
Dept: WOUND CARE | Facility: CLINIC | Age: 73
End: 2023-03-15
Payer: COMMERCIAL

## 2023-03-15 NOTE — TELEPHONE ENCOUNTER
ARACELIS and sent emmanuel brennan to schedule Return Ostomy appt with Jovana GALVAN, next available opening.

## 2023-03-18 DIAGNOSIS — G89.4 CHRONIC PAIN SYNDROME: ICD-10-CM

## 2023-03-20 RX ORDER — OXYCODONE AND ACETAMINOPHEN 5; 325 MG/1; MG/1
TABLET ORAL
Qty: 45 TABLET | Refills: 0 | Status: SHIPPED | OUTPATIENT
Start: 2023-03-20 | End: 2023-05-25

## 2023-03-21 ENCOUNTER — OFFICE VISIT (OUTPATIENT)
Dept: WOUND CARE | Facility: CLINIC | Age: 73
End: 2023-03-21
Payer: COMMERCIAL

## 2023-03-21 DIAGNOSIS — Z93.2 ILEOSTOMY STATUS (H): Primary | ICD-10-CM

## 2023-03-21 PROCEDURE — 99211 OFF/OP EST MAY X REQ PHY/QHP: CPT

## 2023-03-21 NOTE — PROGRESS NOTES
"WO Ostomy Assessment  Patient comes to clinic for consultation regarding ostomy issues.    Referral from Dr. Robles  Consult attended by patient and spouse and daughter Joan  Diagnosis: Bladder cancer Date of Surgery: 06/01/2021   Type of Surgery: CYSTECTOMY, RADICAL, WITH ILEAL CONDUIT CREATION      Subjective:She is here with spouse and daughterand ostomy care is provided by spouse and self  Patient's reason for visit: \"recent leaking\"     The quantity of ostomy supplies needed by a beneficiary is determined primarily by the type of ostomy, its location, its construction, and the condition of the skin surface surrounding the stoma.    Objective:  Type: Ileal Conduit since   Stoma: 19 mm  end healthy, round and protruberant ,    Location: right lower quadrant     Complications: none   Peristomal skin: intact  and barrier is intact  Frequency of pouch change: twice weekly. This is  scheduled.   Received home care [facility] assessment after discharge:Yes    Current pouch system/supplies: [place]   two piece, cut to fit, flat and Adapt barrier ring    Assessment: Nicely protruding stoma but retracted somewhat. Pt prefers the flat but not the two piece. Adapt ring likely dissolving too fast      Intervention/Plan: Try same barrier but with Augustus ring. Same samples given to pt. Her PCP is signing for supplies. If soft convex works better try that with Augustus ring to get more wear time. No need for Barrier film if pouches don't stick to tight to natural skin. Was told \"they don't make Cav[place]\"    Return to clinic prn .      Sue Hendrix NP was available for supervision of care if needed or if questions should arise and regarding plan of care.  Jovana Mann RN  RN CWON      "

## 2023-03-27 DIAGNOSIS — E03.4 HYPOTHYROIDISM DUE TO ACQUIRED ATROPHY OF THYROID: ICD-10-CM

## 2023-03-27 DIAGNOSIS — C50.412 MALIGNANT NEOPLASM OF UPPER-OUTER QUADRANT OF LEFT FEMALE BREAST, UNSPECIFIED ESTROGEN RECEPTOR STATUS (H): ICD-10-CM

## 2023-03-27 DIAGNOSIS — Z51.11 ENCOUNTER FOR ANTINEOPLASTIC CHEMOTHERAPY: ICD-10-CM

## 2023-03-27 DIAGNOSIS — C67.9 UROTHELIAL CARCINOMA OF BLADDER WITH INVASION OF MUSCLE (H): ICD-10-CM

## 2023-03-27 RX ORDER — PROCHLORPERAZINE MALEATE 10 MG
TABLET ORAL
Qty: 30 TABLET | Refills: 2 | Status: SHIPPED | OUTPATIENT
Start: 2023-03-27 | End: 2023-09-12

## 2023-03-27 RX ORDER — LEVOTHYROXINE SODIUM 25 UG/1
TABLET ORAL
Qty: 90 TABLET | Refills: 0 | Status: SHIPPED | OUTPATIENT
Start: 2023-03-27 | End: 2023-07-05

## 2023-03-29 DIAGNOSIS — C67.9 UROTHELIAL CARCINOMA OF BLADDER (H): ICD-10-CM

## 2023-03-30 ENCOUNTER — MEDICAL CORRESPONDENCE (OUTPATIENT)
Dept: HEALTH INFORMATION MANAGEMENT | Facility: CLINIC | Age: 73
End: 2023-03-30

## 2023-03-30 ENCOUNTER — TELEPHONE (OUTPATIENT)
Dept: FAMILY MEDICINE | Facility: OTHER | Age: 73
End: 2023-03-30
Payer: COMMERCIAL

## 2023-03-30 RX ORDER — DOCUSATE SODIUM AND SENNOSIDES 8.6; 5 MG/1; MG/1
TABLET, FILM COATED ORAL
Qty: 100 TABLET | Refills: 0 | Status: SHIPPED | OUTPATIENT
Start: 2023-03-30 | End: 2023-06-01

## 2023-03-30 NOTE — TELEPHONE ENCOUNTER
INCOMING FORMS    Sender: Handi    Type of Form, letter or note (What is requested?): order    How was the form received?: Fax    How should forms be returned?:  Fax : [phone]     Form placed in [facility] bin for review/signature if appropriate.

## 2023-05-03 ENCOUNTER — TELEPHONE (OUTPATIENT)
Dept: FAMILY MEDICINE | Facility: OTHER | Age: 73
End: 2023-05-03
Payer: COMMERCIAL

## 2023-05-03 NOTE — TELEPHONE ENCOUNTER
INCOMING FORMS    Sender: Handi    Type of Form, letter or note (What is requested?): order    How was the form received?: Fax    How should forms be returned?:  Fax : [phone]    Form placed in [facility] bin for review/signature if appropriate.

## 2023-05-04 ENCOUNTER — MEDICAL CORRESPONDENCE (OUTPATIENT)
Dept: HEALTH INFORMATION MANAGEMENT | Facility: CLINIC | Age: 73
End: 2023-05-04
Payer: COMMERCIAL

## 2023-05-18 ENCOUNTER — MYC MEDICAL ADVICE (OUTPATIENT)
Dept: WOUND CARE | Facility: CLINIC | Age: 73
End: 2023-05-18
Payer: COMMERCIAL

## 2023-05-18 NOTE — TELEPHONE ENCOUNTER
Spoke with pt. Pt is using a larger convexity for a very small stoma. Will recommend [id number] which I hope will fit better. This is precut to 19mm Still using the Augustus ring . [id number] is slim ring

## 2023-05-25 ENCOUNTER — MYC MEDICAL ADVICE (OUTPATIENT)
Dept: FAMILY MEDICINE | Facility: OTHER | Age: 73
End: 2023-05-25
Payer: COMMERCIAL

## 2023-05-25 DIAGNOSIS — G89.4 CHRONIC PAIN SYNDROME: ICD-10-CM

## 2023-05-25 RX ORDER — OXYCODONE AND ACETAMINOPHEN 5; 325 MG/1; MG/1
TABLET ORAL
Qty: 45 TABLET | Refills: 0 | Status: SHIPPED | OUTPATIENT
Start: 2023-05-25 | End: 2023-07-24

## 2023-05-30 DIAGNOSIS — C67.9 UROTHELIAL CARCINOMA OF BLADDER (H): ICD-10-CM

## 2023-05-31 ENCOUNTER — MYC MEDICAL ADVICE (OUTPATIENT)
Dept: FAMILY MEDICINE | Facility: OTHER | Age: 73
End: 2023-05-31
Payer: COMMERCIAL

## 2023-06-01 RX ORDER — DOCUSATE SODIUM AND SENNOSIDES 8.6; 5 MG/1; MG/1
TABLET, FILM COATED ORAL
Qty: 100 TABLET | Refills: 0 | Status: SHIPPED | OUTPATIENT
Start: 2023-06-01 | End: 2023-08-11

## 2023-06-10 ENCOUNTER — MYC MEDICAL ADVICE (OUTPATIENT)
Dept: FAMILY MEDICINE | Facility: OTHER | Age: 73
End: 2023-06-10
Payer: COMMERCIAL

## 2023-07-05 DIAGNOSIS — E03.4 HYPOTHYROIDISM DUE TO ACQUIRED ATROPHY OF THYROID: ICD-10-CM

## 2023-07-05 RX ORDER — LEVOTHYROXINE SODIUM 25 UG/1
TABLET ORAL
Qty: 90 TABLET | Refills: 0 | Status: SHIPPED | OUTPATIENT
Start: 2023-07-05 | End: 2023-09-19

## 2023-07-05 NOTE — TELEPHONE ENCOUNTER
Pending Prescriptions:                       Disp   Refills    levothyroxine (SYNTHROID/LEVOTHROID) 25 M*90 tab*0            Sig: TAKE 1 TABLET BY MOUTH EVERY DAY    Medication is being filled for 1 time norma refill only due to:  Patient is due for visit.     Next 5 appointments (look out 90 days)    Jul 07, 2023  2:00 PM  (Arrive by 1:40 PM)  Provider Visit with Phani Albright PA-C  [facility] ([facility] ) [address] [place] [id number] [phone]            Please call and help schedule.  Thank you!

## 2023-07-06 ASSESSMENT — ANXIETY QUESTIONNAIRES
1. FEELING NERVOUS, ANXIOUS, OR ON EDGE: NEARLY EVERY DAY
5. BEING SO RESTLESS THAT IT IS HARD TO SIT STILL: NOT AT ALL
6. BECOMING EASILY ANNOYED OR IRRITABLE: SEVERAL DAYS
7. FEELING AFRAID AS IF SOMETHING AWFUL MIGHT HAPPEN: NEARLY EVERY DAY
GAD7 TOTAL SCORE: 16
3. WORRYING TOO MUCH ABOUT DIFFERENT THINGS: NEARLY EVERY DAY
2. NOT BEING ABLE TO STOP OR CONTROL WORRYING: NEARLY EVERY DAY
4. TROUBLE RELAXING: NEARLY EVERY DAY
IF YOU CHECKED OFF ANY PROBLEMS ON THIS QUESTIONNAIRE, HOW DIFFICULT HAVE THESE PROBLEMS MADE IT FOR YOU TO DO YOUR WORK, TAKE CARE OF THINGS AT HOME, OR GET ALONG WITH OTHER PEOPLE: EXTREMELY DIFFICULT
GAD7 TOTAL SCORE: 16

## 2023-07-06 ASSESSMENT — PATIENT HEALTH QUESTIONNAIRE - PHQ9
SUM OF ALL RESPONSES TO PHQ QUESTIONS 1-9: 11
10. IF YOU CHECKED OFF ANY PROBLEMS, HOW DIFFICULT HAVE THESE PROBLEMS MADE IT FOR YOU TO DO YOUR WORK, TAKE CARE OF THINGS AT HOME, OR GET ALONG WITH OTHER PEOPLE: VERY DIFFICULT
SUM OF ALL RESPONSES TO PHQ QUESTIONS 1-9: 11

## 2023-07-07 ENCOUNTER — OFFICE VISIT (OUTPATIENT)
Dept: FAMILY MEDICINE | Facility: OTHER | Age: 73
End: 2023-07-07
Payer: COMMERCIAL

## 2023-07-07 ENCOUNTER — HOSPITAL ENCOUNTER (OUTPATIENT)
Facility: CLINIC | Age: 73
End: 2023-07-07
Payer: MEDICARE

## 2023-07-07 VITALS
WEIGHT: 140 LBS | SYSTOLIC BLOOD PRESSURE: 122 MMHG | TEMPERATURE: 98.4 F | HEIGHT: 65 IN | HEART RATE: 69 BPM | DIASTOLIC BLOOD PRESSURE: 70 MMHG | BODY MASS INDEX: 23.32 KG/M2 | RESPIRATION RATE: 18 BRPM | OXYGEN SATURATION: 98 %

## 2023-07-07 DIAGNOSIS — H53.9 VISION CHANGES: ICD-10-CM

## 2023-07-07 DIAGNOSIS — R53.83 MALAISE AND FATIGUE: ICD-10-CM

## 2023-07-07 DIAGNOSIS — E03.9 ACQUIRED HYPOTHYROIDISM: ICD-10-CM

## 2023-07-07 DIAGNOSIS — R20.9 SKIN SENSATION DISTURBANCE: ICD-10-CM

## 2023-07-07 DIAGNOSIS — D69.6 THROMBOCYTOPENIA (H): ICD-10-CM

## 2023-07-07 DIAGNOSIS — E21.5 PARATHYROID ABNORMALITY (H): ICD-10-CM

## 2023-07-07 DIAGNOSIS — N18.31 STAGE 3A CHRONIC KIDNEY DISEASE (H): Primary | ICD-10-CM

## 2023-07-07 DIAGNOSIS — C77.5 SECONDARY AND UNSPECIFIED MALIGNANT NEOPLASM OF INTRAPELVIC LYMPH NODES (H): ICD-10-CM

## 2023-07-07 DIAGNOSIS — E03.2 HYPOTHYROIDISM DUE TO MEDICATION: ICD-10-CM

## 2023-07-07 DIAGNOSIS — G93.32 CHRONIC FATIGUE SYNDROME: ICD-10-CM

## 2023-07-07 DIAGNOSIS — C50.412 MALIGNANT NEOPLASM OF UPPER-OUTER QUADRANT OF LEFT BREAST IN FEMALE, ESTROGEN RECEPTOR NEGATIVE (H): ICD-10-CM

## 2023-07-07 DIAGNOSIS — R26.89 BALANCE PROBLEMS: ICD-10-CM

## 2023-07-07 DIAGNOSIS — M06.09 RHEUMATOID ARTHRITIS OF MULTIPLE SITES WITH NEGATIVE RHEUMATOID FACTOR (H): ICD-10-CM

## 2023-07-07 DIAGNOSIS — R91.8 PULMONARY NODULES: ICD-10-CM

## 2023-07-07 DIAGNOSIS — G89.4 CHRONIC PAIN SYNDROME: ICD-10-CM

## 2023-07-07 DIAGNOSIS — D70.1 CHEMOTHERAPY-INDUCED NEUTROPENIA (H): ICD-10-CM

## 2023-07-07 DIAGNOSIS — Z23 NEED FOR SHINGLES VACCINE: ICD-10-CM

## 2023-07-07 DIAGNOSIS — F11.20 CONTINUOUS OPIOID DEPENDENCE (H): ICD-10-CM

## 2023-07-07 DIAGNOSIS — Z17.1 MALIGNANT NEOPLASM OF UPPER-OUTER QUADRANT OF LEFT BREAST IN FEMALE, ESTROGEN RECEPTOR NEGATIVE (H): ICD-10-CM

## 2023-07-07 DIAGNOSIS — T45.1X5A CHEMOTHERAPY-INDUCED NEUTROPENIA (H): ICD-10-CM

## 2023-07-07 DIAGNOSIS — R53.81 MALAISE AND FATIGUE: ICD-10-CM

## 2023-07-07 DIAGNOSIS — Z93.2 ILEOSTOMY STATUS (H): ICD-10-CM

## 2023-07-07 DIAGNOSIS — E43 UNSPECIFIED SEVERE PROTEIN-CALORIE MALNUTRITION (H): ICD-10-CM

## 2023-07-07 DIAGNOSIS — C67.9 UROTHELIAL CARCINOMA OF BLADDER WITH INVASION OF MUSCLE (H): ICD-10-CM

## 2023-07-07 LAB
ALBUMIN SERPL BCG-MCNC: 4.5 G/DL (ref 3.5–5.2)
ALP SERPL-CCNC: 44 U/L (ref 35–104)
ALT SERPL W P-5'-P-CCNC: 14 U/L (ref 0–50)
ANION GAP SERPL CALCULATED.3IONS-SCNC: 13 MMOL/L (ref 7–15)
AST SERPL W P-5'-P-CCNC: 24 U/L (ref 0–45)
BASOPHILS # BLD AUTO: 0 10E3/UL (ref 0–0.2)
BASOPHILS NFR BLD AUTO: 1 %
BILIRUB SERPL-MCNC: 0.2 MG/DL
BUN SERPL-MCNC: 25.5 MG/DL (ref 8–23)
CALCIUM SERPL-MCNC: 10.1 MG/DL (ref 8.8–10.2)
CHLORIDE SERPL-SCNC: 103 MMOL/L (ref 98–107)
CREAT SERPL-MCNC: 1.22 MG/DL (ref 0.51–0.95)
CREAT UR-MCNC: 131.3 MG/DL
CRP SERPL-MCNC: 3.78 MG/L
DEPRECATED HCO3 PLAS-SCNC: 22 MMOL/L (ref 22–29)
EOSINOPHIL # BLD AUTO: 0.2 10E3/UL (ref 0–0.7)
EOSINOPHIL NFR BLD AUTO: 3 %
ERYTHROCYTE [DISTWIDTH] IN BLOOD BY AUTOMATED COUNT: 13.9 % (ref 10–15)
GFR SERPL CREATININE-BSD FRML MDRD: 47 ML/MIN/1.73M2
GLUCOSE SERPL-MCNC: 92 MG/DL (ref 70–99)
HCT VFR BLD AUTO: 34.6 % (ref 35–47)
HGB BLD-MCNC: 11.3 G/DL (ref 11.7–15.7)
IMM GRANULOCYTES # BLD: 0 10E3/UL
IMM GRANULOCYTES NFR BLD: 0 %
LYMPHOCYTES # BLD AUTO: 2 10E3/UL (ref 0.8–5.3)
LYMPHOCYTES NFR BLD AUTO: 30 %
MCH RBC QN AUTO: 33.7 PG (ref 26.5–33)
MCHC RBC AUTO-ENTMCNC: 32.7 G/DL (ref 31.5–36.5)
MCV RBC AUTO: 103 FL (ref 78–100)
MICROALBUMIN UR-MCNC: 228.2 MG/L
MICROALBUMIN/CREAT UR: 173.8 MG/G CR (ref 0–25)
MONOCYTES # BLD AUTO: 0.5 10E3/UL (ref 0–1.3)
MONOCYTES NFR BLD AUTO: 7 %
NEUTROPHILS # BLD AUTO: 3.9 10E3/UL (ref 1.6–8.3)
NEUTROPHILS NFR BLD AUTO: 59 %
PLATELET # BLD AUTO: 271 10E3/UL (ref 150–450)
POTASSIUM SERPL-SCNC: 5.2 MMOL/L (ref 3.4–5.3)
PROT SERPL-MCNC: 7.5 G/DL (ref 6.4–8.3)
RBC # BLD AUTO: 3.35 10E6/UL (ref 3.8–5.2)
SODIUM SERPL-SCNC: 138 MMOL/L (ref 136–145)
TSH SERPL DL<=0.005 MIU/L-ACNC: 4.04 UIU/ML (ref 0.3–4.2)
WBC # BLD AUTO: 6.6 10E3/UL (ref 4–11)

## 2023-07-07 PROCEDURE — 85025 COMPLETE CBC W/AUTO DIFF WBC: CPT | Performed by: PHYSICIAN ASSISTANT

## 2023-07-07 PROCEDURE — 86140 C-REACTIVE PROTEIN: CPT | Performed by: PHYSICIAN ASSISTANT

## 2023-07-07 PROCEDURE — 36415 COLL VENOUS BLD VENIPUNCTURE: CPT | Performed by: PHYSICIAN ASSISTANT

## 2023-07-07 PROCEDURE — 84443 ASSAY THYROID STIM HORMONE: CPT | Performed by: PHYSICIAN ASSISTANT

## 2023-07-07 PROCEDURE — 86052 AQUAPORIN-4 ANTB CBA EACH: CPT | Mod: 90 | Performed by: PHYSICIAN ASSISTANT

## 2023-07-07 PROCEDURE — 82043 UR ALBUMIN QUANTITATIVE: CPT | Performed by: PHYSICIAN ASSISTANT

## 2023-07-07 PROCEDURE — 82570 ASSAY OF URINE CREATININE: CPT | Performed by: PHYSICIAN ASSISTANT

## 2023-07-07 PROCEDURE — 80053 COMPREHEN METABOLIC PANEL: CPT | Performed by: PHYSICIAN ASSISTANT

## 2023-07-07 PROCEDURE — 82306 VITAMIN D 25 HYDROXY: CPT | Performed by: PHYSICIAN ASSISTANT

## 2023-07-07 PROCEDURE — 99000 SPECIMEN HANDLING OFFICE-LAB: CPT | Performed by: PHYSICIAN ASSISTANT

## 2023-07-07 PROCEDURE — 99214 OFFICE O/P EST MOD 30 MIN: CPT | Performed by: PHYSICIAN ASSISTANT

## 2023-07-07 RX ORDER — METHYLPREDNISOLONE 4 MG
TABLET, DOSE PACK ORAL
Qty: 21 TABLET | Refills: 0 | Status: SHIPPED | OUTPATIENT
Start: 2023-07-07 | End: 2023-10-29

## 2023-07-07 ASSESSMENT — PATIENT HEALTH QUESTIONNAIRE - PHQ9
SUM OF ALL RESPONSES TO PHQ QUESTIONS 1-9: 11
10. IF YOU CHECKED OFF ANY PROBLEMS, HOW DIFFICULT HAVE THESE PROBLEMS MADE IT FOR YOU TO DO YOUR WORK, TAKE CARE OF THINGS AT HOME, OR GET ALONG WITH OTHER PEOPLE: VERY DIFFICULT

## 2023-07-07 ASSESSMENT — ENCOUNTER SYMPTOMS: FATIGUE: 1

## 2023-07-07 ASSESSMENT — ANXIETY QUESTIONNAIRES: GAD7 TOTAL SCORE: 16

## 2023-07-07 NOTE — PROGRESS NOTES
Assessment & Plan     Skin sensation disturbance  Balance problems  Vision changes  Chronic pain syndrome  Chronic fatigue syndrome  Malaise and fatigue  Patient has a presentation of numbness and tingling.  Sensation to bilateral upper extremities and lower EXTR is without any injury to the spinal cord and sounds.  She is a chronic pain for some time.  She has had some blurry vision but not necessarily double vision ongoing for quite some time as well.  She suffers from chronic fatigue.  This is not the first time that we have tried to evaluate her for chronic knee syndrome.  Overall her previous physician was that a lot of her fatigue was due to cancer and related treatment.  She is having hard time accepting this and will be evaluated further for possible MS.  She states she little bit of her symptoms online and would like to have this further evaluated.  Advised trial of medications after she had labs drawn to see if that may help with symptoms.  Follow-up with neurology.  - MR Brain w/o & w Contrast; Future  - MR Cervical Spine w Contrast; Future  - MR Lumbar Spine w/o & w Contrast; Future  - Vitamin D Deficiency; Future  - Neuromyelitis Optica AQP4 IgG w Reflex Blood; Future  - Adult Neurology  Referral; Future  - methylPREDNISolone (MEDROL DOSEPAK) 4 MG tablet therapy pack; Follow Package Directions  - CRP, inflammation; Future  - Vitamin D Deficiency  - Neuromyelitis Optica AQP4 IgG w Reflex Blood  - CRP, inflammation    Stage 3a chronic kidney disease (H)  Recheck related labs today.  No new concerns with respect to this.  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  F/U with me PRN.  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  They were released today from the lab.  - Comprehensive metabolic panel  - CBC with Platelets & Differential  - TSH with free T4 reflex    Acquired hypothyroidism  Hypothyroidism due to medication  - TSH with free T4 reflex      Need for shingles vaccine  Advised that she have her shingles shot done through the pharmacy.                   SANDY Meeks [facility] NAI Alcaraz is a 72 year old, presenting for the following health issues:  Fatigue and Tingling (Extremities (bilateral))        7/7/2023     1:43 PM   Additional Questions   Roomed by Blanca STANLEY   Accompanied by self     Fatigue  Associated symptoms include fatigue.   History of Present Illness       Mental Health Follow-up:  Patient presents to follow-up on Depression & Anxiety.Patient's depression since last visit has been:  Worse  The patient is having other symptoms associated with depression.  Patient's anxiety since last visit has been:  Worse  The patient is having other symptoms associated with anxiety.  Any significant life events: health concerns and other  Patient is feeling anxious or having panic attacks.  Patient has no concerns about alcohol or drug use.    Reason for visit:  Extreme body fatigue and tingling and burning in arms and legs several times a day.  Symptom onset:  More than a month  Symptoms include:  Tingling and burning in arms and legs and extreme fatigue in muscles  Symptom intensity:  Severe  Symptom progression:  Worsening  Had these symptoms before:   "No  What makes it worse:  Exercise  What makes it better:  Just sitting still    She eats 0-1 servings of fruits and vegetables daily.She consumes 0 sweetened beverage(s) daily.She exercises with enough effort to increase her heart rate 9 or less minutes per day.  She exercises with enough effort to increase her heart rate 3 or less days per week.   She is taking medications regularly.    Today's PHQ-9         PHQ-9 Total Score: 11    PHQ-9 Q9 Thoughts of better off dead/self-harm past 2 weeks :   Not at all    How difficult have these problems made it for you to do your work, take care of things at home, or get along with other people: Very difficult  Today's AUSTIN-7 Score: 16               Review of Systems   Constitutional: Positive for fatigue.            Objective    /70   Pulse 69   Temp 98.4  F (36.9  C) (Temporal)   Resp 18   Ht 1.651 m (5' 5\")   Wt 63.5 kg (140 lb)   SpO2 98%   BMI 23.30 kg/m    Body mass index is 23.3 kg/m .  Physical Exam                       "

## 2023-07-08 LAB — DEPRECATED CALCIDIOL+CALCIFEROL SERPL-MC: 60 UG/L (ref 20–75)

## 2023-07-10 ENCOUNTER — LAB (OUTPATIENT)
Dept: INFUSION THERAPY | Facility: CLINIC | Age: 73
End: 2023-07-10
Attending: INTERNAL MEDICINE
Payer: MEDICARE

## 2023-07-10 ENCOUNTER — HOSPITAL ENCOUNTER (OUTPATIENT)
Dept: CT IMAGING | Facility: CLINIC | Age: 73
Discharge: HOME | End: 2023-07-10
Attending: INTERNAL MEDICINE
Payer: MEDICARE

## 2023-07-10 ENCOUNTER — MYC MEDICAL ADVICE (OUTPATIENT)
Dept: FAMILY MEDICINE | Facility: OTHER | Age: 73
End: 2023-07-10

## 2023-07-10 DIAGNOSIS — C50.412 MALIGNANT NEOPLASM OF UPPER-OUTER QUADRANT OF LEFT BREAST IN FEMALE, ESTROGEN RECEPTOR NEGATIVE (H): ICD-10-CM

## 2023-07-10 DIAGNOSIS — Z17.1 MALIGNANT NEOPLASM OF UPPER-OUTER QUADRANT OF LEFT BREAST IN FEMALE, ESTROGEN RECEPTOR NEGATIVE (H): ICD-10-CM

## 2023-07-10 DIAGNOSIS — C77.5 SECONDARY AND UNSPECIFIED MALIGNANT NEOPLASM OF INTRAPELVIC LYMPH NODES (H): ICD-10-CM

## 2023-07-10 DIAGNOSIS — C67.9 UROTHELIAL CARCINOMA OF BLADDER WITH INVASION OF MUSCLE (H): Primary | ICD-10-CM

## 2023-07-10 DIAGNOSIS — C67.9 UROTHELIAL CARCINOMA OF BLADDER WITH INVASION OF MUSCLE (H): ICD-10-CM

## 2023-07-10 PROCEDURE — 250N000011 HC RX IP 250 OP 636: Mod: JZ | Performed by: INTERNAL MEDICINE

## 2023-07-10 PROCEDURE — 74177 CT ABD & PELVIS W/CONTRAST: CPT | Mod: MG

## 2023-07-10 PROCEDURE — 250N000009 HC RX 250: Performed by: INTERNAL MEDICINE

## 2023-07-10 PROCEDURE — G1010 CDSM STANSON: HCPCS

## 2023-07-10 PROCEDURE — 250N000011 HC RX IP 250 OP 636: Performed by: INTERNAL MEDICINE

## 2023-07-10 RX ORDER — HEPARIN SODIUM (PORCINE) LOCK FLUSH IV SOLN 100 UNIT/ML 100 UNIT/ML
5 SOLUTION INTRAVENOUS
Status: DISCONTINUED | OUTPATIENT
Start: 2023-07-10 | End: 2023-07-10 | Stop reason: HOSPADM

## 2023-07-10 RX ORDER — HEPARIN SODIUM (PORCINE) LOCK FLUSH IV SOLN 100 UNIT/ML 100 UNIT/ML
5 SOLUTION INTRAVENOUS
Status: CANCELLED | OUTPATIENT
Start: 2023-07-10

## 2023-07-10 RX ORDER — IOPAMIDOL 755 MG/ML
500 INJECTION, SOLUTION INTRAVASCULAR ONCE
Status: COMPLETED | OUTPATIENT
Start: 2023-07-10 | End: 2023-07-10

## 2023-07-10 RX ADMIN — IOPAMIDOL 69 ML: 755 INJECTION, SOLUTION INTRAVENOUS at 10:24

## 2023-07-10 RX ADMIN — HEPARIN 5 ML: 100 SYRINGE at 10:43

## 2023-07-10 RX ADMIN — SODIUM CHLORIDE 52 ML: 9 INJECTION, SOLUTION INTRAVENOUS at 10:24

## 2023-07-10 NOTE — TELEPHONE ENCOUNTER
Referral, face sheet and most recent office note faxed to [facility] at [phone].    Patient notified via MYC.

## 2023-07-10 NOTE — PROGRESS NOTES
Infusion Nursing Note:  Michaela Dorman presents today for port access.    Patient seen by provider today: No   present during visit today: Not Applicable.    Note: Pt here today for port access for a CT. Labs were drawn on 07/07/2023 when patient saw Phani Albright so none were drawn today.       Intravenous Access:  Implanted Port.    Treatment Conditions:  Not Applicable.      Post Infusion Assessment:  Blood return noted pre and post infusion. Port flushed with saline and then with heparin      Discharge Plan:   Patient discharged in stable condition accompanied by: self.  Departure Mode: Ambulatory  Pt has appt on 07/17 for port access for MRI.      Charlotte Coronel RN

## 2023-07-12 LAB — AQP4 H2O CHANNEL IGG SERPL QL IF: NORMAL

## 2023-07-13 ENCOUNTER — VIRTUAL VISIT (OUTPATIENT)
Dept: ONCOLOGY | Facility: CLINIC | Age: 73
End: 2023-07-13
Payer: COMMERCIAL

## 2023-07-13 VITALS — WEIGHT: 135 LBS | HEIGHT: 65 IN | BODY MASS INDEX: 22.49 KG/M2

## 2023-07-13 DIAGNOSIS — R91.8 PULMONARY NODULES: ICD-10-CM

## 2023-07-13 DIAGNOSIS — C50.412 MALIGNANT NEOPLASM OF UPPER-OUTER QUADRANT OF LEFT BREAST IN FEMALE, ESTROGEN RECEPTOR NEGATIVE (H): Primary | ICD-10-CM

## 2023-07-13 DIAGNOSIS — C67.9 UROTHELIAL CARCINOMA OF BLADDER WITH INVASION OF MUSCLE (H): ICD-10-CM

## 2023-07-13 DIAGNOSIS — R53.1 WEAKNESS: ICD-10-CM

## 2023-07-13 DIAGNOSIS — G62.9 NEUROPATHY: Primary | ICD-10-CM

## 2023-07-13 DIAGNOSIS — G62.9 NEUROPATHY: ICD-10-CM

## 2023-07-13 DIAGNOSIS — Z17.1 MALIGNANT NEOPLASM OF UPPER-OUTER QUADRANT OF LEFT BREAST IN FEMALE, ESTROGEN RECEPTOR NEGATIVE (H): Primary | ICD-10-CM

## 2023-07-13 DIAGNOSIS — G03.9 MENINGITIS: ICD-10-CM

## 2023-07-13 PROCEDURE — 99214 OFFICE O/P EST MOD 30 MIN: CPT | Mod: VID | Performed by: INTERNAL MEDICINE

## 2023-07-13 RX ORDER — GABAPENTIN 300 MG/1
300 CAPSULE ORAL 3 TIMES DAILY
Qty: 20 CAPSULE | Refills: 3 | Status: SHIPPED | OUTPATIENT
Start: 2023-07-13 | End: 2023-08-03

## 2023-07-13 RX ORDER — PREDNISONE 5 MG/1
TABLET ORAL
Qty: 91 TABLET | Refills: 0 | Status: SHIPPED | OUTPATIENT
Start: 2023-07-13 | End: 2023-08-10

## 2023-07-13 ASSESSMENT — PAIN SCALES - GENERAL: PAINLEVEL: NO PAIN (0)

## 2023-07-13 NOTE — LETTER
2023         RE: Michaela Dorman  [address]  [facility] [address]        Dear Colleague,    Thank you for referring your patient, Michaela Dorman, to the [facility] [place]. Please see a copy of my visit note below.    [facility] Hematology / Oncology  Progress Note  Name: Michaela Dorman  :  1950    MRN:  [id number]    --------------------    Assessment / Plan:  Left-sided ER-, HER2+ breast CA:  # Sep 2016 Presented w/ left axillary mass; staging multicentric T4 lesion w/ geraldine involvement; biopsy w/ ER-, HER2+ breast cancer  # Sep 2016 - 2017 Neoadjuvant AC x 4 cycles/TH x 4 cycles.  # 2017 Bilateral mastectomy no with geraldine evaluation; complete path CR from invasive cancer; 7 mm DCIS residual.  # 2017 - May 2017 Adjuvant radiation to left chestwall / axilla.  # 2017 - Dec 2017 Adjuvant Herceptin.    Bladder cancer:([id number])  # Oct 2020 Presented w/ dysuria.  # 2021 Cytoscopy w/ muscle-invasive high grade urothelial cancer.  # Mar 2021 TURBT.  # 2021 Neoadjuvant cisplatin/gem split dose complicated by TAMIR.  # May 2021 Neoadjuvant carboplatin/gem.  # 2021 Radical cystectomy w/ ileal conduit; path high grade urothelial carcinoma muscle invasive; margins negs; nodes negative.  # 2021 - 2022 Adjuvant opdivo; discontinued 2/2 headaches.    Clinically, Michaela remains well from a breast and bladder cancer standpoint.  Radiographically, she remains in a complete remission for both diseases; lung nodules have been stable.  Unfortunately, I am more concerned about sudden onset neuropathy and weakness involving her arms and legs.  She has previously experienced neurologic toxicities associated with checkpoint in inhibitors and as such I think we have to approach her with a similar fashion.  B12, folic acid and TSH have all been within normal limits fairly recently.  I agree with plans for brain MRI, cervical MRI, lumbar MRI.  I  agree with neurology consultation but do feel like it needs to be done a little bit sooner than August and will request earlier scheduling.  Planning to start her on a higher dose of prednisone with plans to taper over about a month.  I think we need to monitor for things like myasthenia gravis, multiple sclerosis as well as just inflammatory neuropathies given the possibility for neurotoxicity from immunotherapy.  Given the discomfort I will also plan to start her on gabapentin 300 mg 3 times daily as needed.  I have encouraged her not to drive or use heavy machinery and we discussed the sedating possible side effects.    Girish Sauer MD    --------------------    Interval History:  Michaela returns for follow up bladder and breast cancer.  All in all, she had been doing well up until recently.  Unfortunately about a month ago she began developing worsening neuropathic pain involving her arms and legs.  Is also associated with some weakness.  Is causing some balance problems.  She saw her primary care provider who started her on a Pred pack and has plans for an MRI.    --------------------    Physical Exam:  Video visit.    Labs / Imaging / Path:  We reviewed CBC, CMP.  We personally reviewed her CT CAP.    Video Visit:  Michaela is a 72 year old female who is being evaluated via a billable video visit.  }    Video start time: 10:02 AM  Video end time: 10:26 AM    Provider location: Off-site  Patient location: Home    Mode of transmission: [employer] / [employer].        Again, thank you for allowing me to participate in the care of your patient.        Sincerely,        Girish Sauer MD

## 2023-07-13 NOTE — PATIENT INSTRUCTIONS
1) Additional labs per Lissa's schedule (AM cortisol, ACH receptor antibodies.  2) Please contact neurology for earlier visit (immune toxicity).  3) BJT MG 4-6 weeks (no labs or imaging).    Girish Sauer MD.

## 2023-07-13 NOTE — NURSING NOTE
Is the patient currently in the state of [place]? YES    Visit mode:VIDEO    If the visit is dropped, the patient can be reconnected by: VIDEO VISIT: Text to cell phone: [phone]    Will anyone else be joining the visit? NO      How would you like to obtain your AVS? MyChart    Are changes needed to the allergy or medication list? DORIS DE LEÓN    Reason for visit: Oncology Clinic Visit (Urothelial carcinoma of bladder ), Results (CT and labs), and RECHECK

## 2023-07-13 NOTE — PROGRESS NOTES
[facility] Hematology / Oncology  Progress Note  Name: Michaela Dorman  :  1950    MRN:  [id number]    --------------------    Assessment / Plan:  Left-sided ER-, HER2+ breast CA:  # Sep 2016 Presented w/ left axillary mass; staging multicentric T4 lesion w/ geraldine involvement; biopsy w/ ER-, HER2+ breast cancer  # Sep 2016 - 2017 Neoadjuvant AC x 4 cycles/TH x 4 cycles.  # 2017 Bilateral mastectomy no with geraldine evaluation; complete path CR from invasive cancer; 7 mm DCIS residual.  # 2017 - May 2017 Adjuvant radiation to left chestwall / axilla.  # 2017 - Dec 2017 Adjuvant Herceptin.    Bladder cancer:([id number])  # Oct 2020 Presented w/ dysuria.  # 2021 Cytoscopy w/ muscle-invasive high grade urothelial cancer.  # Mar 2021 TURBT.  # 2021 Neoadjuvant cisplatin/gem split dose complicated by TAMIR.  # May 2021 Neoadjuvant carboplatin/gem.  # 2021 Radical cystectomy w/ ileal conduit; path high grade urothelial carcinoma muscle invasive; margins negs; nodes negative.  # 2021 - 2022 Adjuvant opdivo; discontinued 2/2 headaches.    Clinically, Michaela remains well from a breast and bladder cancer standpoint.  Radiographically, she remains in a complete remission for both diseases; lung nodules have been stable.  Unfortunately, I am more concerned about sudden onset neuropathy and weakness involving her arms and legs.  She has previously experienced neurologic toxicities associated with checkpoint in inhibitors and as such I think we have to approach her with a similar fashion.  B12, folic acid and TSH have all been within normal limits fairly recently.  I agree with plans for brain MRI, cervical MRI, lumbar MRI.  I agree with neurology consultation but do feel like it needs to be done a little bit sooner than August and will request earlier scheduling.  Planning to start her on a higher dose of prednisone with plans to taper over about a month.  I think we need to monitor  for things like myasthenia gravis, multiple sclerosis as well as just inflammatory neuropathies given the possibility for neurotoxicity from immunotherapy.  Given the discomfort I will also plan to start her on gabapentin 300 mg 3 times daily as needed.  I have encouraged her not to drive or use heavy machinery and we discussed the sedating possible side effects.    Girish Sauer MD    --------------------    Interval History:  Michaela returns for follow up bladder and breast cancer.  All in all, she had been doing well up until recently.  Unfortunately about a month ago she began developing worsening neuropathic pain involving her arms and legs.  Is also associated with some weakness.  Is causing some balance problems.  She saw her primary care provider who started her on a Pred pack and has plans for an MRI.    --------------------    Physical Exam:  Video visit.    Labs / Imaging / Path:  We reviewed CBC, CMP.  We personally reviewed her CT CAP.    Video Visit:  Michaela is a 72 year old female who is being evaluated via a billable video visit.  }    Video start time: 10:02 AM  Video end time: 10:26 AM    Provider location: Off-site  Patient location: Home    Mode of transmission: [employer] / [employer].

## 2023-07-14 NOTE — TELEPHONE ENCOUNTER
RECORDS RECEIVED FROM: Internal   REASON FOR VISIT: Balance problems, Vision changes   Date of Appt: 07/26/2023   NOTES (FOR ALL VISITS) STATUS DETAILS   OFFICE NOTE from referring provider Internal 07/07/2023 Dr Albright [facility]    OFFICE NOTE from other specialist Internal 07/13/2023 Dr Sauer [facility]    DISCHARGE SUMMARY from hospital N/A    DISCHARGE REPORT from the ER N/A    OPERATIVE REPORT N/A    ANAND Virus Labs (MS ONLY) N/A    EMG N/A    EEG N/A    MEDICATION LIST N/A    IMAGING  (FOR ALL VISITS)     LUMBAR PUNCTURE N/A    OZIEL SCAN (MOVEMENT) N/A    ULTRASOUND (CAROTID BILAT) *VASCULAR* N/A    MRI (HEAD, NECK, SPINE) Internal 07/17/2023 lumbar cervical spine brain  02/16/2022 brain   CT (HEAD, NECK, SPINE) N/A

## 2023-07-17 ENCOUNTER — HOSPITAL ENCOUNTER (OUTPATIENT)
Dept: MRI IMAGING | Facility: CLINIC | Age: 73
Discharge: HOME | End: 2023-07-17
Attending: PHYSICIAN ASSISTANT
Payer: MEDICARE

## 2023-07-17 ENCOUNTER — INFUSION THERAPY VISIT (OUTPATIENT)
Dept: INFUSION THERAPY | Facility: CLINIC | Age: 73
End: 2023-07-17
Attending: INTERNAL MEDICINE
Payer: MEDICARE

## 2023-07-17 DIAGNOSIS — G93.32 CHRONIC FATIGUE SYNDROME: ICD-10-CM

## 2023-07-17 DIAGNOSIS — H53.9 VISION CHANGES: ICD-10-CM

## 2023-07-17 DIAGNOSIS — R26.89 BALANCE PROBLEMS: ICD-10-CM

## 2023-07-17 DIAGNOSIS — G89.4 CHRONIC PAIN SYNDROME: ICD-10-CM

## 2023-07-17 DIAGNOSIS — C67.9 UROTHELIAL CARCINOMA OF BLADDER WITH INVASION OF MUSCLE (H): ICD-10-CM

## 2023-07-17 DIAGNOSIS — R20.9 SKIN SENSATION DISTURBANCE: ICD-10-CM

## 2023-07-17 DIAGNOSIS — C50.412 MALIGNANT NEOPLASM OF UPPER-OUTER QUADRANT OF LEFT BREAST IN FEMALE, ESTROGEN RECEPTOR NEGATIVE (H): Primary | ICD-10-CM

## 2023-07-17 DIAGNOSIS — R53.81 MALAISE AND FATIGUE: ICD-10-CM

## 2023-07-17 DIAGNOSIS — C77.5 SECONDARY AND UNSPECIFIED MALIGNANT NEOPLASM OF INTRAPELVIC LYMPH NODES (H): ICD-10-CM

## 2023-07-17 DIAGNOSIS — Z17.1 MALIGNANT NEOPLASM OF UPPER-OUTER QUADRANT OF LEFT BREAST IN FEMALE, ESTROGEN RECEPTOR NEGATIVE (H): Primary | ICD-10-CM

## 2023-07-17 DIAGNOSIS — R53.83 MALAISE AND FATIGUE: ICD-10-CM

## 2023-07-17 DIAGNOSIS — G62.9 NEUROPATHY: ICD-10-CM

## 2023-07-17 LAB — CORTIS SERPL-MCNC: 4.3 UG/DL

## 2023-07-17 PROCEDURE — 70553 MRI BRAIN STEM W/O & W/DYE: CPT | Mod: MG

## 2023-07-17 PROCEDURE — A9585 GADOBUTROL INJECTION: HCPCS | Mod: JZ | Performed by: PHYSICIAN ASSISTANT

## 2023-07-17 PROCEDURE — 255N000002 HC RX 255 OP 636: Mod: JZ | Performed by: PHYSICIAN ASSISTANT

## 2023-07-17 PROCEDURE — 72158 MRI LUMBAR SPINE W/O & W/DYE: CPT | Mod: 26 | Performed by: PREVENTIVE MEDICINE

## 2023-07-17 PROCEDURE — 82533 TOTAL CORTISOL: CPT

## 2023-07-17 PROCEDURE — 72156 MRI NECK SPINE W/O & W/DYE: CPT | Mod: 26 | Performed by: PREVENTIVE MEDICINE

## 2023-07-17 PROCEDURE — G1010 CDSM STANSON: HCPCS

## 2023-07-17 PROCEDURE — 70553 MRI BRAIN STEM W/O & W/DYE: CPT | Mod: 26 | Performed by: PREVENTIVE MEDICINE

## 2023-07-17 PROCEDURE — 36591 DRAW BLOOD OFF VENOUS DEVICE: CPT

## 2023-07-17 PROCEDURE — 250N000011 HC RX IP 250 OP 636: Mod: JZ | Performed by: INTERNAL MEDICINE

## 2023-07-17 PROCEDURE — G1010 CDSM STANSON: HCPCS | Performed by: PREVENTIVE MEDICINE

## 2023-07-17 PROCEDURE — 83516 IMMUNOASSAY NONANTIBODY: CPT

## 2023-07-17 PROCEDURE — 72158 MRI LUMBAR SPINE W/O & W/DYE: CPT | Mod: ME

## 2023-07-17 RX ORDER — HEPARIN SODIUM (PORCINE) LOCK FLUSH IV SOLN 100 UNIT/ML 100 UNIT/ML
5 SOLUTION INTRAVENOUS
Status: CANCELLED | OUTPATIENT
Start: 2023-07-17

## 2023-07-17 RX ORDER — GADOBUTROL 604.72 MG/ML
6.5 INJECTION INTRAVENOUS ONCE
Status: COMPLETED | OUTPATIENT
Start: 2023-07-17 | End: 2023-07-17

## 2023-07-17 RX ORDER — HEPARIN SODIUM (PORCINE) LOCK FLUSH IV SOLN 100 UNIT/ML 100 UNIT/ML
5 SOLUTION INTRAVENOUS
Status: DISCONTINUED | OUTPATIENT
Start: 2023-07-17 | End: 2023-07-17 | Stop reason: HOSPADM

## 2023-07-17 RX ADMIN — GADOBUTROL 6.5 ML: 604.72 INJECTION INTRAVENOUS at 13:48

## 2023-07-17 RX ADMIN — HEPARIN 5 ML: 100 SYRINGE at 15:31

## 2023-07-17 NOTE — PROGRESS NOTES
Infusion Nursing Note:  Michaela Dorman presents today for power port access/labs.    Patient seen by provider today: No   present during visit today: Not Applicable.    Note: Patient doing well. Enjoying her summer. Denies pain..      Intravenous Access:  Labs drawn without difficulty.  Implanted Port.    Treatment Conditions:  Not Applicable.      Post Infusion Assessment:  Blood return noted pre and post infusion.  Site patent and intact, free from redness, edema or discomfort.  Access discontinued per protocol.       Discharge Plan:   Patient discharged in stable condition accompanied by: self.  Departure Mode: Ambulatory.      Tami Knowles RN

## 2023-07-19 NOTE — NURSING NOTE
DISCHARGE PLAN:  Next appointments: See patient instruction section  Departure Mode: video  Accompanied by:    minutes for nursing discharge (face to face time)     Michaela Dorman is here today for oncology follow up.  Patient was not seen by writing nurse at time of appointment.   Appointments scheduled for follow up. She completed follow up labs. Pt is aware of her appts. See patient instructions and Oncologist's Progress note for further details. Questions and concerns addressed to patient's satisfaction. Patient verbalized and demonstrated understanding of plan.  Contact information provided and patient is encouraged to call with any that arise in the interim of care.    Lashell PRESCOTT [facility]  [phone]  7/19/2023, 11:03 AM

## 2023-07-20 LAB — ACHR BLOCK AB/ACHR TOTAL SFR SER: 5 %

## 2023-07-21 DIAGNOSIS — G89.4 CHRONIC PAIN SYNDROME: ICD-10-CM

## 2023-07-21 DIAGNOSIS — C67.9 UROTHELIAL CARCINOMA OF BLADDER WITH INVASION OF MUSCLE (H): ICD-10-CM

## 2023-07-21 NOTE — TELEPHONE ENCOUNTER
Future Office Visit:      Requested Prescriptions   Pending Prescriptions Disp Refills    LORazepam (ATIVAN) 0.5 MG tablet [Pharmacy Med Name: LORAZEPAM 0.5MG TABLET] 30 tablet 3     Sig: TAKE 1 TABLET (0.5 MG) BY MOUTH EVERY 4 HOURS AS NEEDED FOR ANXIETY, NAUSEA OR SLEEP       There is no refill protocol information for this order       oxyCODONE-acetaminophen (PERCOCET) 5-325 MG tablet [Pharmacy Med Name: OXYCODONE/APAP 5MG-325MG TAB] 45 tablet 0     Sig: TAKE 1 TABLET BY MOUTH EVERY 6 HOURS AS NEEDED FORSEVERE PAIN (7-10)       There is no refill protocol information for this order          Unable to fill per RN protocol, will forward to provider for review.       Carol Rollins RN on 7/21/2023 at 4:15 PM

## 2023-07-24 ENCOUNTER — MYC MEDICAL ADVICE (OUTPATIENT)
Dept: FAMILY MEDICINE | Facility: OTHER | Age: 73
End: 2023-07-24
Payer: COMMERCIAL

## 2023-07-24 RX ORDER — OXYCODONE AND ACETAMINOPHEN 5; 325 MG/1; MG/1
TABLET ORAL
Qty: 40 TABLET | Refills: 0 | Status: SHIPPED | OUTPATIENT
Start: 2023-07-24 | End: 2023-09-18

## 2023-07-24 RX ORDER — LORAZEPAM 0.5 MG/1
0.5 TABLET ORAL EVERY 4 HOURS PRN
Qty: 25 TABLET | Refills: 3 | Status: SHIPPED | OUTPATIENT
Start: 2023-07-24 | End: 2023-10-31

## 2023-07-26 ENCOUNTER — MYC MEDICAL ADVICE (OUTPATIENT)
Dept: ONCOLOGY | Facility: CLINIC | Age: 73
End: 2023-07-26

## 2023-07-26 ENCOUNTER — PRE VISIT (OUTPATIENT)
Dept: NEUROLOGY | Facility: CLINIC | Age: 73
End: 2023-07-26

## 2023-07-26 ENCOUNTER — OFFICE VISIT (OUTPATIENT)
Dept: NEUROLOGY | Facility: CLINIC | Age: 73
End: 2023-07-26
Payer: COMMERCIAL

## 2023-07-26 VITALS
BODY MASS INDEX: 22.49 KG/M2 | HEART RATE: 67 BPM | HEIGHT: 65 IN | SYSTOLIC BLOOD PRESSURE: 128 MMHG | WEIGHT: 135 LBS | DIASTOLIC BLOOD PRESSURE: 71 MMHG

## 2023-07-26 DIAGNOSIS — G62.9 NEUROPATHY: Primary | ICD-10-CM

## 2023-07-26 DIAGNOSIS — R79.9 ABNORMAL FINDING OF BLOOD CHEMISTRY, UNSPECIFIED: ICD-10-CM

## 2023-07-26 DIAGNOSIS — R53.1 WEAKNESS: ICD-10-CM

## 2023-07-26 DIAGNOSIS — H53.2 DOUBLE VISION: ICD-10-CM

## 2023-07-26 DIAGNOSIS — G89.4 CHRONIC PAIN SYNDROME: Primary | ICD-10-CM

## 2023-07-26 LAB
CK SERPL-CCNC: 104 U/L (ref 26–192)
ERYTHROCYTE [SEDIMENTATION RATE] IN BLOOD BY WESTERGREN METHOD: 7 MM/HR (ref 0–30)
HBA1C MFR BLD: 5.4 % (ref 0–5.6)
TOTAL PROTEIN SERUM FOR ELP: 7.3 G/DL (ref 6.4–8.3)
VIT B12 SERPL-MCNC: >4000 PG/ML (ref 232–1245)

## 2023-07-26 PROCEDURE — 84165 PROTEIN E-PHORESIS SERUM: CPT | Performed by: INTERNAL MEDICINE

## 2023-07-26 PROCEDURE — 82607 VITAMIN B-12: CPT | Performed by: INTERNAL MEDICINE

## 2023-07-26 PROCEDURE — 36415 COLL VENOUS BLD VENIPUNCTURE: CPT | Performed by: INTERNAL MEDICINE

## 2023-07-26 PROCEDURE — 84155 ASSAY OF PROTEIN SERUM: CPT | Performed by: INTERNAL MEDICINE

## 2023-07-26 PROCEDURE — 83519 RIA NONANTIBODY: CPT | Mod: 90 | Performed by: INTERNAL MEDICINE

## 2023-07-26 PROCEDURE — 83036 HEMOGLOBIN GLYCOSYLATED A1C: CPT | Performed by: INTERNAL MEDICINE

## 2023-07-26 PROCEDURE — 86334 IMMUNOFIX E-PHORESIS SERUM: CPT | Performed by: INTERNAL MEDICINE

## 2023-07-26 PROCEDURE — 85652 RBC SED RATE AUTOMATED: CPT | Performed by: INTERNAL MEDICINE

## 2023-07-26 PROCEDURE — 86255 FLUORESCENT ANTIBODY SCREEN: CPT | Mod: 90 | Performed by: INTERNAL MEDICINE

## 2023-07-26 PROCEDURE — 83516 IMMUNOASSAY NONANTIBODY: CPT | Mod: 90 | Performed by: INTERNAL MEDICINE

## 2023-07-26 PROCEDURE — 99205 OFFICE O/P NEW HI 60 MIN: CPT | Performed by: INTERNAL MEDICINE

## 2023-07-26 PROCEDURE — 99000 SPECIMEN HANDLING OFFICE-LAB: CPT | Performed by: INTERNAL MEDICINE

## 2023-07-26 PROCEDURE — 82550 ASSAY OF CK (CPK): CPT | Performed by: INTERNAL MEDICINE

## 2023-07-26 NOTE — LETTER
7/26/2023         RE: Michaela Dorman  [address]  [facility] [address]        Dear Colleague,    Thank you for referring your patient, Michaela Dorman, to the [facility] [place]. Please see a copy of my visit note below.    [facility] Neurology Consultation    Michaela Dorman MRN# [id number]   Age: 72 year old YOB: 1950     Requesting physician: No ref. provider found  Girish Sauer     Reason for Consultation: numbness/tingling      History of Presenting Symptoms:   Michaela Dorman is a 72 year old female who presents today for evaluation of numbness/tingling.    Patient developed numbness/tingling and worsening balance about 3 months. Symptoms were in both legs and arms. Symptoms developed over a few weeks and worsened. She also felt really weak. Numbness/tingling would come and go and would either be in both arm/legs or no where.     She was started on prednisone 2 weeks ago and this has helped a lot. She was started on gabapentin 300 mg TID as well. She has had chronic joint pains for years and the prednisone has helped with these symptoms. She previously took 1/2 oxycodone every day. She stopped this recently with prednisone.      She has blurring vision and occasional double vision. When she looks all the way to the right she has to shut one eye due to double vision. This has been going on about the last year. Vision has been a little better since starting prednisone.     She previously had chemotherapy for breast cancer and bladder cancer. She didn't have neuropathy symptoms with chemotherapy. She was treated with immunotherapy as well. Her last treatment was opdivo in February 2022, which was discontinued due to headaches.     [place]     She has a urostomy bag. She denies any bowel incontinence. She denies any numbness/tingling in the genital region.     She denies any low back pain radiating down the legs. She previously had  this prior to low back surgery in 2015. She has some neck pain, which sometimes radiates down the right arm.       Past Medical History:     Patient Active Problem List   Diagnosis     Enthesopathy of hip region     Family history of stroke (cerebrovascular)     Trochanteric bursitis     Advanced directives, counseling/discussion     Health Care Home     Hypertension goal BP (blood pressure) < 140/90     Baker's cyst of knee     Patella-femoral syndrome     Adjustment disorder with depressed mood     Malignant neoplasm of upper-outer quadrant of left breast in female, estrogen receptor negative (H)     Encounter for antineoplastic chemotherapy     S/P bilateral mastectomy     Anxiety     Hyperlipidemia LDL goal <130     S/P reverse total shoulder arthroplasty, right     Prophylactic antibiotic for dental procedure indicated due to prior joint replacement     Chronic pain syndrome     Lumbar radiculopathy     Foraminal stenosis of lumbar region     Central stenosis of spinal canal     DDD (degenerative disc disease), lumbar     Stage 3a chronic kidney disease (H)     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)     Magallanes's neuroma of right foot     Bilateral impacted cerumen     S/P lumbar fusion     Anemia     Carotid stenosis, bilateral L80%, R40%     Carotid artery plaque, bilateral     POSSIBLE Right internal carotid artery aneurysm     Symptomatic stenosis of left carotid artery     Personal history of malignant neoplasm of breast     Acute cystitis with hematuria     Benign paroxysmal positional vertigo, bilateral     Personal history of malignant neoplasm of bladder     Malignant neoplasm of overlapping sites of bladder (H)     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)     Chemotherapy-induced neutropenia (H)     Thrombocytopenia (H)     Anemia in neoplastic disease     Constipation, unspecified constipation type     Imaging abnormalities     Hypomagnesemia      Need for prophylactic vaccination and inoculation against influenza     Rheumatoid arthritis of multiple sites with negative rheumatoid factor (H)     Parathyroid abnormality (H)     Arthritis of shoulder region, right     Osteoarthrosis, hip     Tinnitus of right ear     Status of artificial opening of urinary tract (H)     Status post ileal conduit (H)     Chronic fatigue syndrome     Vision changes     Balance problems     Skin sensation disturbance     Ileostomy status (H)     Acquired hypothyroidism     Hypothyroidism due to medication     Pulmonary nodules     F11.2 - Continuous opioid dependence (H)     Past Medical History:   Diagnosis Date     Acute kidney injury (H)      Carotid stenosis, bilateral L80%, R40% 09/02/2020     Central stenosis of spinal canal 12/01/2017    lumbar      DDD (degenerative disc disease), lumbar 12/01/2017     Depressive disorder, not elsewhere classified      Esophageal reflux      ETOH abuse     in remission     Foraminal stenosis of lumbar region 12/01/2017    Complete lumbar spine left at various degrees and to a lesser extent the right as well.     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     Subdural hematoma (H)      Thyroid disease      Urothelial carcinoma (H)         Past Surgical History:     Past Surgical History:   Procedure Laterality Date     ARTHROPLASTY SHOULDER Right 11/17/2015     ARTHROSCOPY KNEE  6/7/2012    Procedure:ARTHROSCOPY KNEE; right knee arthroscopy with partial lateral menisectomy; Surgeon:DAMIÁN MCALLISTER; Location:[facility]     BIOPSY VAGINAL N/A 10/27/2021    Procedure: DISTAL URETHRECTOMY;  Surgeon: Leonardo Robles MD;  Location: [facility] OR     COLONOSCOPY N/A 12/22/2017    Procedure: COLONOSCOPY;  colonoscopy;  Surgeon: Mannie  MD Chuck;  Location: [facility]     CYSTECTOMY BLADDER RADICAL, ILEAL DIVERSION, COMBINED N/A 6/1/2021    Procedure: RADICAL CYSTECTOMY  WITH ILEAL CONDUIT CREATION,BILATERAL PELVIC LYMPHADENECTOMY;  Surgeon: Leonardo Robles MD;  Location: [facility] OR     CYSTOSCOPY, RETROGRADES, COMBINED Bilateral 3/18/2021    Procedure: CYSTOSCOPY, WITH RETROGRADE PYELOGRAM;  Surgeon: Girish Jack MD;  Location: [facility]     CYSTOSCOPY, TRANSURETHRAL RESECTION (TUR) TUMOR BLADDER, COMBINED N/A 3/18/2021    Procedure: CYSTOSCOPY, WITH TRANSURETHRAL RESECTION BLADDER TUMOR;  Surgeon: Girish Jack MD;  Location: [facility]     ENDARTERECTOMY CAROTID Left 10/8/2020    Procedure: LEFT CAROTID ENDARTERECTOMY WITH EEG;  Surgeon: Lacho Jasmine MD;  Location: [facility] OR     ESOPHAGOSCOPY, GASTROSCOPY, DUODENOSCOPY (EGD), COMBINED N/A 6/20/2022    Procedure: ESOPHAGOGASTRODUODENOSCOPY, WITH BIOPSY;  Surgeon: Ramses Arenas MD;  Location: [facility]     EXCISE MASS AXILLA Left 9/16/2016    Procedure: EXCISE MASS AXILLA;  Surgeon: Keyon Blanco MD;  Location: [facility]     HC REMV CATARACT EXTRACAP,INSERT LENS, W/O ECP  6/25/2009    Right eye     INJECT EPIDURAL LUMBAR N/A 4/11/2019    Procedure: interlaminar epidual steroid injection lumbar 4-5;  Surgeon: Oskar Salvador MD;  Location: [facility]     INJECT EPIDURAL LUMBAR Bilateral 9/12/2019    Procedure: lumbar 4-5 epidural interlaminar steroid injection;  Surgeon: Oskar Salvador MD;  [facility]     INSERT PORT VASCULAR ACCESS Right 10/7/2016    Procedure: INSERT PORT VASCULAR ACCESS;  Surgeon: Keyon Blanco MD;  [facility]: [facility]     IR CHEST PORT PLACEMENT > 5 YRS OF AGE  4/16/2021     MASTECTOMY SIMPLE BILATERAL Bilateral 2/8/2017    Procedure: MASTECTOMY SIMPLE BILATERAL;  Surgeon: Keyon Blanco MD;  [facility]: [facility]     OPEN REDUCTION INTERNAL FIXATION FOOT Right 3/20/2015    Procedure: OPEN REDUCTION INTERNAL FIXATION FOOT;  Surgeon: Javi Herrmann DPM;  Location: [facility]      OPTICAL TRACKING SYSTEM FUSION SPINE POSTERIOR LUMBAR TWO LEVELS N/A 2020    Procedure: LUMBAR 2-SACRAL1 TRANSFORMINAL INTERBODY FUSION;  Surgeon: Lenny Gomes MD;  Location: [facility]     REMOVE PORT VASCULAR ACCESS Right 2018    Procedure: REMOVE PORT VASCULAR ACCESS;  right intra jugular port removal;  Surgeon: Keyon Blanco MD;  Location: [facility]     SHOULDER SURGERY Right 2015     ZZC LIGATE FALLOPIAN TUBE       ZZC NONSPECIFIC PROCEDURE      ankle fracture surgery        Social History:     Social History     Tobacco Use     Smoking status: Former     Packs/day: 3.00     Years: 10.00     Pack years: 30.00     Types: Cigarettes     Quit date: 1979     Years since quittin.6     Smokeless tobacco: Never     Tobacco comments:     approx. 3 packs daily   Vaping Use     Vaping Use: Never used   Substance Use Topics     Alcohol use: Yes     Comment: 1-2 daily glass of wine     Drug use: No        Family History:     Family History   Problem Relation Age of Onset     Hypertension Mother      Thyroid Disease Mother      Cerebrovascular Disease Mother      Hypertension Father      Cerebrovascular Disease Father      Cancer Father         skin     Thyroid Disease Sister      Diabetes Brother         type 2     Depression Sister      Breast Cancer Sister         age 47     Cancer Sister         skin     Cancer Brother         inside nose        Medications:     Current Outpatient Medications   Medication Sig     acetaminophen (TYLENOL) 500 MG tablet Take 1,000 mg by mouth 3 times daily as needed for mild pain (500MG X 2 = 1,000MG)     ALPRAZolam (XANAX) 0.5 MG tablet TAKE 1 TABLET BY MOUTH DAILY AT BEDTIME AS NEEDED FOR SLEEP     aspirin (ASA) 81 MG chewable tablet Take 1 tablet (81 mg) by mouth daily     carvedilol (COREG) 6.25 MG tablet Take 1 tablet (6.25 mg) by mouth 2 times daily (with meals)     Cyanocobalamin (B-12 PO) Take by mouth daily      escitalopram (LEXAPRO) 20 MG tablet  "Take 1 tablet (20 mg) by mouth daily     gabapentin (NEURONTIN) 300 MG capsule Take 1 capsule (300 mg) by mouth 3 times daily     levothyroxine (SYNTHROID/LEVOTHROID) 25 MCG tablet TAKE 1 TABLET BY MOUTH EVERY DAY     LORazepam (ATIVAN) 0.5 MG tablet TAKE 1 TABLET (0.5 MG) BY MOUTH EVERY 4 HOURS AS NEEDED FOR ANXIETY, NAUSEA OR SLEEP     meclizine 25 MG CHEW Take 25 mg by mouth every 6 hours as needed for dizziness      methylPREDNISolone (MEDROL DOSEPAK) 4 MG tablet therapy pack Follow Package Directions     omeprazole (PRILOSEC) 20 MG DR capsule Take 20 mg by mouth daily     oxyCODONE-acetaminophen (PERCOCET) 5-325 MG tablet TAKE 1 TABLET BY MOUTH EVERY 6 HOURS AS NEEDED FORSEVERE PAIN (7-10)     oxyCODONE-acetaminophen (PERCOCET) 5-325 MG tablet TAKE 1/2-1 TABLETS BY MOUTHEVERY 6 HOURS AS NEEDED FORSEVERE PAIN     predniSONE (DELTASONE) 5 MG tablet Take 6 tablets (30 mg) by mouth daily for 7 days, THEN 4 tablets (20 mg) daily for 7 days, THEN 2 tablets (10 mg) daily for 7 days, THEN 1 tablet (5 mg) daily for 7 days.     prochlorperazine (COMPAZINE) 10 MG tablet TAKE 1/2 TABLET BY MOUTH EVERY 6 HOURS AS NEEDED FORNAUSEA/VOMITING     STOOL SOFTENER/LAXATIVE 50-8.6 MG tablet TAKE 2 TABLETS BY MOUTH DAILY AT BEDTIME     No current facility-administered medications for this visit.        Allergies:     Allergies   Allergen Reactions     Oxybutynin      Patient reports symptoms of nausea and mind fogginess        Review of Systems:   As above     Physical Exam:   Vitals: /71   Pulse 67   Ht 1.651 m (5' 5\")   Wt 61.2 kg (135 lb)   BMI 22.47 kg/m     General: Seated comfortably in no acute distress.  HEENT: Optic discs appear grossly sharp on funduscopic exam.   Lungs: breathing comfortably  Neurologic:     Mental Status: Fully alert, attentive. Normal memory and fund of knowledge. Language normal, speech clear and fluent, no paraphasic errors.      Cranial Nerves: Visual fields intact. PERRL. Double vision " looking laterally, both eyes with possible slight difficulty in fully adducting. Facial sensation intact/symmetric. Facial movements symmetric. Hearing not formally tested but intact to conversation. Palate elevation symmetric, uvula midline. No dysarthria. Shoulder shrug strong bilaterally. Tongue protrusion midline.     Motor: No tremors or other abnormal movements observed. Muscle tone normal throughout. Normal/symmetric rapid finger tapping. Strength as below.      Right Left   Shoulder abduction        5 5   Elbow extension 5 5   Elbow flexion 5 5   Wrist extension         5 5   Finger extension 5 5   ADM 5 5   FDI 5 5   APB 4+ 4+   Hip flexion 5 5   Knee flexion 5 5   Knee extension 5 5   Dorsiflexion 5 5   Plantar flexion 5 5        Deep Tendon Reflexes:     Right Left   Biceps 2 2   BRD 1 2   Triceps 2 2   Patellar 2 2   Achilles 1 1   Plantar equivocal equivocal   Clonus Absent Absent        Sensory: Intact/symmetric to light touch, and proprioception throughout upper and lower extremities. Temperature sensation is mildly diminished in the feet compared to arms. Vibration is ~3 seconds in the left great toe and ~4 seconds in the right great toe, normal in the hands. Negative Romberg.      Coordination: Finger-nose-finger and heel-shin intact without dysmetria. Rapid alternating movements intact/symmetric with normal speed and rhythm.     Gait: Very mildly wide based, but overall steady casual gait. Able to walk on toes without difficulties. Moderate issues with heel gait. Not able to tandem without support.          Data: Pertinent prior to visit   Imaging:  MRI brain 7/2023  IMPRESSION:  1. No acute intracranial pathology.  2. Mild leukoaraiosis and parenchymal volume loss, most likely sequela  of chronic microvascular ischemic disease. MRI findings atypical for  demyelinating disease.    MRI cervical 7/2023  IMPRESSION: No substantial change in multilevel spondylosis most  pronounced at C5-C6 since  comparison MRI. No high-grade spinal canal  stenosis or myelopathic cord signal.      MRI lumbar 7/2023  IMPRESSION:  1. Postoperative L2-S1 fusion and posterior element decompression  changes. Spinal canal is widely patent at all levels. Persistent  dorsal epidural space fluid collection most likely represents a stable  seroma dating back to at least 2021.  2. Multilevel lumbar spondylosis as detailed in body of the report.    Procedures:  EMG 2018    Laboratory:  CMP with TAMIR, TSH normal, CBC with mild anemia, CRP normal, Vit D normal (7/2023)         Assessment and Plan:   Assessment:  Michaela Dorman is a 72 year old female who presents today for evaluation of numbness/tingling. Started about 3 month patient developed progressive numbness/tingling in the extremities, imbalance, and sense of weakness. These symptoms have largely improved since starting prednisone and gabapentin 2 weeks ago per Dr Sauer. Neurological examination shows mild length dependent sensory changes, intact strength, mild gait imbalance, and possible subtle extra ocular movement abnormalities. Patient recently had unrevealing MRI imaging as below. I suspect patient has polyneuropathy, possibly related to prior chemotherapy, but I don't think this explains entire presentation. EMG ordered to further evaluate subtype of polyneuropathy. Polyneuropathy labs were ordered and additional labs could be ordered after EMG.     Myasthenia gravis antibodies ordered for binocular double vision. MRI brain does not show an abnormality contributing to these symptoms.      Plan:  - EMG (right arm and leg)  - Serum B12, A1c, ELP, immunofixation  - Serum CK, AChR binding/blocking/modulating, anti-MuSK Ab    Follow up in Neurology clinic pending above    Dennis Reed MD   of Neurology  [facility]      The total time of this encounter today amounted to 60 minutes. This time included time spent with the patient, prep work,  ordering tests, and performing post visit documentation.      Again, thank you for allowing me to participate in the care of your patient.        Sincerely,        Dennis Reed MD

## 2023-07-26 NOTE — PROGRESS NOTES
[facility] Neurology Consultation    Michaela Dorman MRN# [id number]   Age: 72 year old YOB: 1950     Requesting physician: No ref. provider found  Girish Sauer     Reason for Consultation: numbness/tingling      History of Presenting Symptoms:   Michaela Dorman is a 72 year old female who presents today for evaluation of numbness/tingling.    Patient developed numbness/tingling and worsening balance about 3 months. Symptoms were in both legs and arms. Symptoms developed over a few weeks and worsened. She also felt really weak. Numbness/tingling would come and go and would either be in both arm/legs or no where.     She was started on prednisone 2 weeks ago and this has helped a lot. She was started on gabapentin 300 mg TID as well. She has had chronic joint pains for years and the prednisone has helped with these symptoms. She previously took 1/2 oxycodone every day. She stopped this recently with prednisone.      She has blurring vision and occasional double vision. When she looks all the way to the right she has to shut one eye due to double vision. This has been going on about the last year. Vision has been a little better since starting prednisone.     She previously had chemotherapy for breast cancer and bladder cancer. She didn't have neuropathy symptoms with chemotherapy. She was treated with immunotherapy as well. Her last treatment was opdivo in February 2022, which was discontinued due to headaches.     [place]     She has a urostomy bag. She denies any bowel incontinence. She denies any numbness/tingling in the genital region.     She denies any low back pain radiating down the legs. She previously had this prior to low back surgery in 2015. She has some neck pain, which sometimes radiates down the right arm.       Past Medical History:     Patient Active Problem List   Diagnosis    Enthesopathy of hip region    Family history of stroke (cerebrovascular)     Trochanteric bursitis    Advanced directives, counseling/discussion    Health Care Home    Hypertension goal BP (blood pressure) < 140/90    Baker's cyst of knee    Patella-femoral syndrome    Adjustment disorder with depressed mood    Malignant neoplasm of upper-outer quadrant of left breast in female, estrogen receptor negative (H)    Encounter for antineoplastic chemotherapy    S/P bilateral mastectomy    Anxiety    Hyperlipidemia LDL goal <130    S/P reverse total shoulder arthroplasty, right    Prophylactic antibiotic for dental procedure indicated due to prior joint replacement    Chronic pain syndrome    Lumbar radiculopathy    Foraminal stenosis of lumbar region    Central stenosis of spinal canal    DDD (degenerative disc disease), lumbar    Stage 3a chronic kidney disease (H)    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)    Magallanes's neuroma of right foot    Bilateral impacted cerumen    S/P lumbar fusion    Anemia    Carotid stenosis, bilateral L80%, R40%    Carotid artery plaque, bilateral    POSSIBLE Right internal carotid artery aneurysm    Symptomatic stenosis of left carotid artery    Personal history of malignant neoplasm of breast    Acute cystitis with hematuria    Benign paroxysmal positional vertigo, bilateral    Personal history of malignant neoplasm of bladder    Malignant neoplasm of overlapping sites of bladder (H)    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)    Chemotherapy-induced neutropenia (H)    Thrombocytopenia (H)    Anemia in neoplastic disease    Constipation, unspecified constipation type    Imaging abnormalities    Hypomagnesemia    Need for prophylactic vaccination and inoculation against influenza    Rheumatoid arthritis of multiple sites with negative rheumatoid factor (H)    Parathyroid abnormality (H)    Arthritis of shoulder region, right    Osteoarthrosis, hip    Tinnitus of right ear    Status of artificial opening of  urinary tract (H)    Status post ileal conduit (H)    Chronic fatigue syndrome    Vision changes    Balance problems    Skin sensation disturbance    Ileostomy status (H)    Acquired hypothyroidism    Hypothyroidism due to medication    Pulmonary nodules    F11.2 - Continuous opioid dependence (H)     Past Medical History:   Diagnosis Date    Acute kidney injury (H)     Carotid stenosis, bilateral L80%, R40% 09/02/2020    Central stenosis of spinal canal 12/01/2017    lumbar     DDD (degenerative disc disease), lumbar 12/01/2017    Depressive disorder, not elsewhere classified     Esophageal reflux     ETOH abuse     in remission    Foraminal stenosis of lumbar region 12/01/2017    Complete lumbar spine left at various degrees and to a lesser extent the right as well.    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    Subdural hematoma (H)     Thyroid disease     Urothelial carcinoma (H)         Past Surgical History:     Past Surgical History:   Procedure Laterality Date    ARTHROPLASTY SHOULDER Right 11/17/2015    ARTHROSCOPY KNEE  6/7/2012    Procedure:ARTHROSCOPY KNEE; right knee arthroscopy with partial lateral menisectomy; Surgeon:DAMIÁN MCALLISTER; Location:[facility] OR    BIOPSY VAGINAL N/A 10/27/2021    Procedure: DISTAL URETHRECTOMY;  Surgeon: Leonardo Robles MD;  Location: [facility] OR    COLONOSCOPY N/A 12/22/2017    Procedure: COLONOSCOPY;  colonoscopy;  Surgeon: Chuck Chand MD;  Location: [facility]    CYSTECTOMY BLADDER RADICAL, ILEAL DIVERSION, COMBINED N/A 6/1/2021    Procedure: RADICAL CYSTECTOMY  WITH ILEAL CONDUIT CREATION,BILATERAL PELVIC LYMPHADENECTOMY;  Surgeon: Leonardo Robles MD;  Location: [facility] OR    CYSTOSCOPY, RETROGRADES, COMBINED Bilateral 3/18/2021    Procedure: CYSTOSCOPY,  WITH RETROGRADE PYELOGRAM;  Surgeon: Girish Jack MD;  Location: [facility]    CYSTOSCOPY, TRANSURETHRAL RESECTION (TUR) TUMOR BLADDER, COMBINED N/A 3/18/2021    Procedure: CYSTOSCOPY, WITH TRANSURETHRAL RESECTION BLADDER TUMOR;  Surgeon: Girish Jack MD;  Location: [facility] OR    ENDARTERECTOMY CAROTID Left 10/8/2020    Procedure: LEFT CAROTID ENDARTERECTOMY WITH EEG;  Surgeon: Lacho Jasmine MD;  Location: [facility] OR    ESOPHAGOSCOPY, GASTROSCOPY, DUODENOSCOPY (EGD), COMBINED N/A 6/20/2022    Procedure: ESOPHAGOGASTRODUODENOSCOPY, WITH BIOPSY;  Surgeon: Ramses Arenas MD;  Location: [facility]    EXCISE MASS AXILLA Left 9/16/2016    Procedure: EXCISE MASS AXILLA;  Surgeon: Keyon Blanco MD;  Location: [facility]    HC REMV CATARACT EXTRACAP,INSERT LENS, W/O ECP  6/25/2009    Right eye    INJECT EPIDURAL LUMBAR N/A 4/11/2019    Procedure: interlaminar epidual steroid injection lumbar 4-5;  Surgeon: Oskar Salvador MD;  Location: [facility]    INJECT EPIDURAL LUMBAR Bilateral 9/12/2019    Procedure: lumbar 4-5 epidural interlaminar steroid injection;  Surgeon: Oskar Salvador MD;  [facility]: [facility]    INSERT PORT VASCULAR ACCESS Right 10/7/2016    Procedure: INSERT PORT VASCULAR ACCESS;  Surgeon: Keyon Blanco MD;  Location: [facility]    IR CHEST PORT PLACEMENT > 5 YRS OF AGE  4/16/2021    MASTECTOMY SIMPLE BILATERAL Bilateral 2/8/2017    Procedure: MASTECTOMY SIMPLE BILATERAL;  Surgeon: Keyon Blanco MD;  [facility]: [facility]    OPEN REDUCTION INTERNAL FIXATION FOOT Right 3/20/2015    Procedure: OPEN REDUCTION INTERNAL FIXATION FOOT;  Surgeon: Javi Herrmann DPM;  Location: [facility]    OPTICAL TRACKING SYSTEM FUSION SPINE POSTERIOR LUMBAR TWO LEVELS N/A 2/4/2020    Procedure: LUMBAR 2-SACRAL1 TRANSFORMINAL INTERBODY FUSION;  Surgeon: Lenny Gomes MD;  Location: [facility]    REMOVE PORT VASCULAR ACCESS Right 2/14/2018    Procedure: REMOVE PORT VASCULAR ACCESS;  right intra jugular port removal;  Surgeon: Keyon Blanco MD;   Location: [facility]    SHOULDER SURGERY Right 2015    [facility] LIGATE FALLOPIAN TUBE      [facility] NONSPECIFIC PROCEDURE      ankle fracture surgery        Social History:     Social History     Tobacco Use    Smoking status: Former     Packs/day: 3.00     Years: 10.00     Pack years: 30.00     Types: Cigarettes     Quit date: 1979     Years since quittin.6    Smokeless tobacco: Never    Tobacco comments:     approx. 3 packs daily   Vaping Use    Vaping Use: Never used   Substance Use Topics    Alcohol use: Yes     Comment: 1-2 daily glass of wine    Drug use: No        Family History:     Family History   Problem Relation Age of Onset    Hypertension Mother     Thyroid Disease Mother     Cerebrovascular Disease Mother     Hypertension Father     Cerebrovascular Disease Father     Cancer Father         skin    Thyroid Disease Sister     Diabetes Brother         type 2    Depression Sister     Breast Cancer Sister         age 47    Cancer Sister         skin    Cancer Brother         inside nose        Medications:     Current Outpatient Medications   Medication Sig    acetaminophen (TYLENOL) 500 MG tablet Take 1,000 mg by mouth 3 times daily as needed for mild pain (500MG X 2 = 1,000MG)    ALPRAZolam (XANAX) 0.5 MG tablet TAKE 1 TABLET BY MOUTH DAILY AT BEDTIME AS NEEDED FOR SLEEP    aspirin (ASA) 81 MG chewable tablet Take 1 tablet (81 mg) by mouth daily    carvedilol (COREG) 6.25 MG tablet Take 1 tablet (6.25 mg) by mouth 2 times daily (with meals)    Cyanocobalamin (B-12 PO) Take by mouth daily     escitalopram (LEXAPRO) 20 MG tablet Take 1 tablet (20 mg) by mouth daily    gabapentin (NEURONTIN) 300 MG capsule Take 1 capsule (300 mg) by mouth 3 times daily    levothyroxine (SYNTHROID/LEVOTHROID) 25 MCG tablet TAKE 1 TABLET BY MOUTH EVERY DAY    LORazepam (ATIVAN) 0.5 MG tablet TAKE 1 TABLET (0.5 MG) BY MOUTH EVERY 4 HOURS AS NEEDED FOR ANXIETY, NAUSEA OR SLEEP    meclizine 25 MG CHEW Take 25 mg by mouth every  "6 hours as needed for dizziness     methylPREDNISolone (MEDROL DOSEPAK) 4 MG tablet therapy pack Follow Package Directions    omeprazole (PRILOSEC) 20 MG DR capsule Take 20 mg by mouth daily    oxyCODONE-acetaminophen (PERCOCET) 5-325 MG tablet TAKE 1 TABLET BY MOUTH EVERY 6 HOURS AS NEEDED FORSEVERE PAIN (7-10)    oxyCODONE-acetaminophen (PERCOCET) 5-325 MG tablet TAKE 1/2-1 TABLETS BY MOUTHEVERY 6 HOURS AS NEEDED FORSEVERE PAIN    predniSONE (DELTASONE) 5 MG tablet Take 6 tablets (30 mg) by mouth daily for 7 days, THEN 4 tablets (20 mg) daily for 7 days, THEN 2 tablets (10 mg) daily for 7 days, THEN 1 tablet (5 mg) daily for 7 days.    prochlorperazine (COMPAZINE) 10 MG tablet TAKE 1/2 TABLET BY MOUTH EVERY 6 HOURS AS NEEDED FORNAUSEA/VOMITING    STOOL SOFTENER/LAXATIVE 50-8.6 MG tablet TAKE 2 TABLETS BY MOUTH DAILY AT BEDTIME     No current facility-administered medications for this visit.        Allergies:     Allergies   Allergen Reactions    Oxybutynin      Patient reports symptoms of nausea and mind fogginess        Review of Systems:   As above     Physical Exam:   Vitals: /71   Pulse 67   Ht 1.651 m (5' 5\")   Wt 61.2 kg (135 lb)   BMI 22.47 kg/m     General: Seated comfortably in no acute distress.  HEENT: Optic discs appear grossly sharp on funduscopic exam.   Lungs: breathing comfortably  Neurologic:     Mental Status: Fully alert, attentive. Normal memory and fund of knowledge. Language normal, speech clear and fluent, no paraphasic errors.      Cranial Nerves: Visual fields intact. PERRL. Double vision looking laterally, both eyes with possible slight difficulty in fully adducting. Facial sensation intact/symmetric. Facial movements symmetric. Hearing not formally tested but intact to conversation. Palate elevation symmetric, uvula midline. No dysarthria. Shoulder shrug strong bilaterally. Tongue protrusion midline.     Motor: No tremors or other abnormal movements observed. Muscle tone normal " throughout. Normal/symmetric rapid finger tapping. Strength as below.      Right Left   Shoulder abduction        5 5   Elbow extension 5 5   Elbow flexion 5 5   Wrist extension         5 5   Finger extension 5 5   ADM 5 5   FDI 5 5   APB 4+ 4+   Hip flexion 5 5   Knee flexion 5 5   Knee extension 5 5   Dorsiflexion 5 5   Plantar flexion 5 5        Deep Tendon Reflexes:     Right Left   Biceps 2 2   BRD 1 2   Triceps 2 2   Patellar 2 2   Achilles 1 1   Plantar equivocal equivocal   Clonus Absent Absent        Sensory: Intact/symmetric to light touch, and proprioception throughout upper and lower extremities. Temperature sensation is mildly diminished in the feet compared to arms. Vibration is ~3 seconds in the left great toe and ~4 seconds in the right great toe, normal in the hands. Negative Romberg.      Coordination: Finger-nose-finger and heel-shin intact without dysmetria. Rapid alternating movements intact/symmetric with normal speed and rhythm.     Gait: Very mildly wide based, but overall steady casual gait. Able to walk on toes without difficulties. Moderate issues with heel gait. Not able to tandem without support.          Data: Pertinent prior to visit   Imaging:  MRI brain 7/2023  IMPRESSION:  1. No acute intracranial pathology.  2. Mild leukoaraiosis and parenchymal volume loss, most likely sequela  of chronic microvascular ischemic disease. MRI findings atypical for  demyelinating disease.    MRI cervical 7/2023  IMPRESSION: No substantial change in multilevel spondylosis most  pronounced at C5-C6 since comparison MRI. No high-grade spinal canal  stenosis or myelopathic cord signal.      MRI lumbar 7/2023  IMPRESSION:  1. Postoperative L2-S1 fusion and posterior element decompression  changes. Spinal canal is widely patent at all levels. Persistent  dorsal epidural space fluid collection most likely represents a stable  seroma dating back to at least 2021.  2. Multilevel lumbar spondylosis as detailed  in body of the report.    Procedures:  EMG 2018    Laboratory:  CMP with TAMIR, TSH normal, CBC with mild anemia, CRP normal, Vit D normal (7/2023)         Assessment and Plan:   Assessment:  Michaela Dorman is a 72 year old female who presents today for evaluation of numbness/tingling. Started about 3 month patient developed progressive numbness/tingling in the extremities, imbalance, and sense of weakness. These symptoms have largely improved since starting prednisone and gabapentin 2 weeks ago per Dr Sauer. Neurological examination shows mild length dependent sensory changes, intact strength, mild gait imbalance, and possible subtle extra ocular movement abnormalities. Patient recently had unrevealing MRI imaging as below. I suspect patient has polyneuropathy, possibly related to prior chemotherapy, but I don't think this explains entire presentation. EMG ordered to further evaluate subtype of polyneuropathy. Polyneuropathy labs were ordered and additional labs could be ordered after EMG.     Myasthenia gravis antibodies ordered for binocular double vision. MRI brain does not show an abnormality contributing to these symptoms.      Plan:  - EMG (right arm and leg)  - Serum B12, A1c, ELP, immunofixation  - Serum CK, AChR binding/blocking/modulating, anti-MuSK Ab    Follow up in Neurology clinic pending above    Dennis Reed MD   of Neurology  [facility]      The total time of this encounter today amounted to 60 minutes. This time included time spent with the patient, prep work, ordering tests, and performing post visit documentation.

## 2023-07-26 NOTE — NURSING NOTE
"Michaela Dorman's goals for this visit include:   Chief Complaint   Patient presents with    RECHECK    New Patient     Balance issues// has fallen recently, right occular       She requests these members of her care team be copied on today's visit information: yes    PCP: Girish Sauer    Referring Provider:  No referring provider defined for this encounter.    /71   Pulse 67   Ht 1.651 m (5' 5\")   Wt 61.2 kg (135 lb)   BMI 22.47 kg/m      Do you need any medication refills at today's visit? No  JULIANNE Crabtree, CMA ([facility])      "

## 2023-07-28 LAB
ACHR BIND AB SER-SCNC: 0 NMOL/L
ACHR BLOCK AB/ACHR TOTAL SFR SER: 9 %
ACHR MOD AB/ACHR TOTAL SFR SER: 0 %
ALBUMIN SERPL ELPH-MCNC: 4.7 G/DL (ref 3.7–5.1)
ALPHA1 GLOB SERPL ELPH-MCNC: 0.3 G/DL (ref 0.2–0.4)
ALPHA2 GLOB SERPL ELPH-MCNC: 0.8 G/DL (ref 0.5–0.9)
B-GLOBULIN SERPL ELPH-MCNC: 0.7 G/DL (ref 0.6–1)
GAMMA GLOB SERPL ELPH-MCNC: 0.8 G/DL (ref 0.7–1.6)
M PROTEIN SERPL ELPH-MCNC: 0 G/DL
PROT PATTERN SERPL ELPH-IMP: NORMAL
PROT PATTERN SERPL IFE-IMP: NORMAL

## 2023-07-29 LAB — MUSK AB SER QL: NORMAL

## 2023-07-31 ENCOUNTER — PATIENT OUTREACH (OUTPATIENT)
Dept: ONCOLOGY | Facility: CLINIC | Age: 73
End: 2023-07-31
Payer: COMMERCIAL

## 2023-07-31 NOTE — PROGRESS NOTES
Patient called basically to state she feels like a new person after starting Prednisone.  All of her symptoms have improved.  She is currently on a taper and is on 2 tablets (10 mg) for 7 days.  She is asking if she could be maintained on a low dose after she finishes her taper.  She does have a follow-up appointment with Dr. Sauer at the end of August.    Advised patient writer would send a message to MD for review.

## 2023-07-31 NOTE — TELEPHONE ENCOUNTER
Miky Franklin and Dr Sauer is there a day that she could please be worked in sooner with in a wk or so? . Thank You America Avilez is ok per pt

## 2023-08-01 DIAGNOSIS — G62.9 NEUROPATHY: ICD-10-CM

## 2023-08-01 NOTE — TELEPHONE ENCOUNTER
Message sent to Dr. Sauer to follow-up on previous message about prednisone. Waiting response.  Noemi Garcia RNCC

## 2023-08-02 ENCOUNTER — MYC MEDICAL ADVICE (OUTPATIENT)
Dept: FAMILY MEDICINE | Facility: OTHER | Age: 73
End: 2023-08-02
Payer: COMMERCIAL

## 2023-08-02 DIAGNOSIS — G62.9 NEUROPATHY: Primary | ICD-10-CM

## 2023-08-02 RX ORDER — PREDNISONE 20 MG/1
20 TABLET ORAL DAILY
Qty: 14 TABLET | Refills: 0 | Status: SHIPPED | OUTPATIENT
Start: 2023-08-02 | End: 2023-09-03

## 2023-08-02 NOTE — TELEPHONE ENCOUNTER
Patient has sent messages to Dr. Sauer for maintenance dose of prednisone, but provider is out of office x 2 weeks.     Joyce BRODYN, RN  [facility]

## 2023-08-02 NOTE — TELEPHONE ENCOUNTER
Faxed refill request received from: [facility]  Medication Requested:   gabapentin (NEURONTIN) 300 MG capsule 20 capsule 3 7/13/2023  --   Sig - Route: Take 1 capsule (300 mg) by mouth 3 times daily - Oral   Sent to pharmacy as: Gabapentin 300 MG Oral Capsule (NEURONTIN)   Class: E-Prescribe     Last Office Visit: 07/13/2023  Next Appointment Scheduled for: 08/23/23  Last refill: 07/13/23    Refill request sent to Dr. Sauer to review.  Noemi Garcia RNCC

## 2023-08-02 NOTE — TELEPHONE ENCOUNTER
Sent 20 mg tablets so only take ONE daily for up to 14 days until Dr. Sauer is back but will not extend course it will need to be Dr. Sauer who makes that decision.     René Caldwell PA-C

## 2023-08-02 NOTE — TELEPHONE ENCOUNTER
This RN aware. Message has already been sent to Dr. Sauer regarding patient request and questions. Waiting response.  Noemi Garcia RNCC

## 2023-08-03 DIAGNOSIS — I10 HYPERTENSION GOAL BP (BLOOD PRESSURE) < 140/90: ICD-10-CM

## 2023-08-03 RX ORDER — GABAPENTIN 300 MG/1
300 CAPSULE ORAL 3 TIMES DAILY
Qty: 90 CAPSULE | Refills: 3 | Status: SHIPPED | OUTPATIENT
Start: 2023-08-03 | End: 2023-10-12

## 2023-08-03 RX ORDER — CARVEDILOL 6.25 MG/1
6.25 TABLET ORAL 2 TIMES DAILY WITH MEALS
Qty: 90 TABLET | Refills: 0 | Status: SHIPPED | OUTPATIENT
Start: 2023-08-03 | End: 2023-09-19

## 2023-08-03 NOTE — TELEPHONE ENCOUNTER
"Nephrology follow up is \"as needed\". No Luis follow up scheduled at this time. Requested next fill to be sent to pts PCP.    "

## 2023-08-06 RX ORDER — PREDNISONE 10 MG/1
TABLET ORAL
Qty: 25 TABLET | Refills: 0 | Status: SHIPPED | OUTPATIENT
Start: 2023-08-06 | End: 2023-08-26

## 2023-08-07 NOTE — TELEPHONE ENCOUNTER
Maintenance prednisone is NOT recommended.    Would recommend prednisone taper (provided).    Should have a follow-up visit w/ me to discuss in future.    Girish Sauer MD.

## 2023-08-10 ENCOUNTER — OFFICE VISIT (OUTPATIENT)
Dept: NEUROLOGY | Facility: CLINIC | Age: 73
End: 2023-08-10
Payer: COMMERCIAL

## 2023-08-10 DIAGNOSIS — G62.9 NEUROPATHY: Primary | ICD-10-CM

## 2023-08-10 PROCEDURE — 95913 NRV CNDJ TEST 13/> STUDIES: CPT | Performed by: INTERNAL MEDICINE

## 2023-08-10 PROCEDURE — 95885 MUSC TST DONE W/NERV TST LIM: CPT | Mod: RT | Performed by: INTERNAL MEDICINE

## 2023-08-10 NOTE — PROGRESS NOTES
[facility]  Electrodiagnostic Laboratory                 Department of Neurology                                                                                                         Test Date:  8/10/2023    Patient: Lissa Dorman : 1950 Physician: Dennis Reed MD   Sex: Female AGE: 72 year Ref Phys:    ID#: [id number]   Technician: Javi Quiroz     History and Examination:  Patient is a 73 y/o female who presents for evaluation of paresthesias and imbalance. EMG ordered to evaluate for polyneuropathy.     Techniques:  Motor conduction studies were done with surface recording electrodes. Sensory conduction studies were performed with surface electrodes, unless indicated otherwise by (n), designating the use of subdermal recording electrodes. Temperature was monitored and recorded throughout the study. Upper extremities were maintained at a temperature of 32 degrees Centigrade or higher.  EMG was done with a concentric needle electrode.     Results:  Evaluation of the left Fibular (EDB) motor nerve showed prolonged distal onset latency (6.1 ms) and reduced amplitude (0.50 mV).  The right Fibular (EDB) motor nerve showed reduced amplitude (0.97 mV), decreased conduction velocity (Bel Fib Head-Ankle, 34 m/s), and decreased conduction velocity (Pop Fossa-Bel Fib Head, 34 m/s).  The right Median (APB) motor nerve showed reduced amplitude (Elbow, 4.1 mV).  The left Tibial (AHB) motor, the left Superficial Fibular sensory, the right Superficial Fibular sensory, and the right sural sensory nerves showed reduced amplitude (L2.1, L2, R2, R3, R4, R4  V).  The right Tibial (AHB) motor and the left sural sensory nerves showed reduced amplitude (R3.6, L5, L2  V) and decreased conduction velocity (R29, L38 m/s).  The right ulnar sensory nerve showed decreased conduction velocity (46 m/s).  All remaining nerves (as indicated in the following tables) were within normal limits.       Needle evaluation of the right Tibialis Anterior and the right Gastrocnemius muscles showed moderately increased motor unit amplitude, moderately increased motor unit duration, and mild reduced recruitment.  The right Gluteus Medius muscle showed complex repetitive discharge HF, slightly increased motor unit amplitude, slightly increased motor unit duration, and mildly reduced recruitment.  All remaining muscles (as indicated in the following table) showed no evidence of electrical instability.        Interpretation:  This is an abnormal examination. 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 Amp Diff Segment Distance Velocity Neg. Dur Neg Area Diff Temperature Comment   Site (ms) Norm (mV) Norm %  cm m/s Norm ms %  C    Right Dp Branch Fibular (TA) Motor   Fib Head 3.8  < 6.0 4.4 -      -  31.8    Pop Fossa 5.1  < 5.7 4.3 - -2.3 Pop Fossa-Fib Head 6.2 48 - 10.3 - 31.8    Left Fibular (EDB) Motor   Ankle 6.1  < 6.0 0.50  > 2.0  Ankle-EDB 8   6.6  30.8    Right Fibular (EDB) Motor   Ankle 5.1  < 6.0 0.97  > 2.0  Ankle-EDB 8   4.9  31.7    Bel Fib Head 14.3 - 0.95 - -2.1 Bel Fib Head-Ankle 31.7 34  > 38 4.8 -22.2 31.9    Pop Fossa 16.1 - 0.93 - -2.1 Pop Fossa-Bel Fib Head 6.1 34  > 38 5.1 0 31.9    Right Median (APB) Motor   Wrist 3.8  < 4.4 5.2  > 5.0  Wrist-APB 8   4.0  33.4    Elbow 7.7 - 4.1  > 5.0 -21.2 Elbow-Wrist 22.8 58  > 48 4.8 -3.4 33.4    Right Median/Ulnar (Lumb-Dors Int II) Motor        Median (Lumb I)   Wrist 3.0 - 1.11 -  Wrist-Lumb I 10   5.3  33.3         Ulnar (Dorsal Int (manus))   Wrist 2.7 - 2.8 -  Wrist-Dorsal Int (manus) 10   4.9  33.3    Left Tibial (AHB) Motor   Ankle 4.3  < 6.5 2.1  > 5.0  Ankle-AHB 8   6.1  31    Right Tibial (AHB)  Motor   Ankle 4.7  < 6.5 3.6  > 5.0  Ankle-AHB 8   5.6  31.9    Knee 16.7 - 3.7 - 2.8 Knee-Ankle 35 29  > 38 4.5 -11.7 32    Right Ulnar (ADM) Motor   Wrist 2.8  < 3.5 8.2  > 5.0  Wrist-ADM 8   4.7  33.4    Bel Elbow 6.1 - 7.0 - -14.6 Bel Elbow-Wrist 19 58  > 48 4.8 -7.0 33.4    Abv Elbow 8.3 - 6.1 - -12.9 Abv Elbow-Bel Elbow 11.7 53  > 48 5.1 -6.2 33.3      Sensory Sites      Onset Lat Peak Lat Amp (O-P) Amp (P-P) Segment Distance Velocity Temperature Comment   Site ms ms  V Norm  V  cm m/s Norm  C    Right Median Sensory   Wrist-Dig II 2.6 3.5 13  > 10 22 Wrist-Dig II 14 54  > 48 33.2    Right Radial Sensory   Forearm-Wrist 1.68 2.3 20  > 15 24 Forearm-Wrist 10 60 - 33.4    Left Superficial Fibular Sensory   14 cm-Ankle 3.2 4.2 3  > 3 2 14 cm-Ankle 12.5 39  > 38 30.7    Right Superficial Fibular Sensory   14 cm-Ankle 3.1 3.9 2  > 3 3 14 cm-Ankle 12.5 40  > 38 31.9    Left Sural Sensory   Calf-Lat Mall 3.7 4.6 5  > 5 2 Calf-Lat Mall 14 38  > 38 31    Right Sural Sensory   Calf-Lat Mall 3.5 4.3 4  > 5 4 Calf-Lat Mall 14 40  > 38 31.6    Right Ulnar Sensory   Wrist-Dig V 2.7 3.5 12  > 8 33 Wrist-Dig V 12.5 46  > 48 33.3        Electromyography     Side Muscle Ins Act Fibs/PSW Fasc HF Amp Dur Poly Recrt Int Pat   Right Tib Anterior Nml None Nml 0 2+ 2+ 0 Brandon Nml   Right Gastroc Nml None Nml 0 2+ 2+ 0 Brandon Nml   Right Vastus Med Nml None Nml 0 Nml Nml 0 Nml Nml   Right Deltoid Nml None Nml 0 Nml Nml 0 Nml Nml   Right Triceps Nml None Nml 0 Nml Nml 0 Nml Nml   Right FDI Nml None Nml 0 Nml Nml 0 Nml Nml   Right Gluteus Med Nml None Nml CRD 1+ 1+ 0 Brandon Nml         NCS Waveforms:    Motor                           Sensory

## 2023-08-10 NOTE — LETTER
8/10/2023         RE: Michaela Dorman  [address]  [facility] [address]        Dear Colleague,    Thank you for referring your patient, Michaela Dorman, to the [facility] [place]. Please see a copy of my visit note below.                            [facility]  Electrodiagnostic Laboratory                 Department of Neurology                                                                                                         Test Date:  8/10/2023    Patient: Lissa Dorman : 1950 Physician: Dennis Reed MD   Sex: Female AGE: 72 year Ref Phys:    ID#: [id number]   Technician: Javi Quiroz     History and Examination:  Patient is a 73 y/o female who presents for evaluation of paresthesias and imbalance. EMG ordered to evaluate for polyneuropathy.     Techniques:  Motor conduction studies were done with surface recording electrodes. Sensory conduction studies were performed with surface electrodes, unless indicated otherwise by (n), designating the use of subdermal recording electrodes. Temperature was monitored and recorded throughout the study. Upper extremities were maintained at a temperature of 32 degrees Centigrade or higher.  EMG was done with a concentric needle electrode.     Results:  Evaluation of the left Fibular (EDB) motor nerve showed prolonged distal onset latency (6.1 ms) and reduced amplitude (0.50 mV).  The right Fibular (EDB) motor nerve showed reduced amplitude (0.97 mV), decreased conduction velocity (Bel Fib Head-Ankle, 34 m/s), and decreased conduction velocity (Pop Fossa-Bel Fib Head, 34 m/s).  The right Median (APB) motor nerve showed reduced amplitude (Elbow, 4.1 mV).  The left Tibial (AHB) motor, the left Superficial Fibular sensory, the right Superficial Fibular sensory, and the right sural sensory nerves showed reduced amplitude (L2.1, L2, R2, R3, R4, R4  V).  The right Tibial (AHB) motor and the left sural sensory nerves  showed reduced amplitude (R3.6, L5, L2  V) and decreased conduction velocity (R29, L38 m/s).  The right ulnar sensory nerve showed decreased conduction velocity (46 m/s).  All remaining nerves (as indicated in the following tables) were within normal limits.      Needle evaluation of the right Tibialis Anterior and the right Gastrocnemius muscles showed moderately increased motor unit amplitude, moderately increased motor unit duration, and mild reduced recruitment.  The right Gluteus Medius muscle showed complex repetitive discharge HF, slightly increased motor unit amplitude, slightly increased motor unit duration, and mildly reduced recruitment.  All remaining muscles (as indicated in the following table) showed no evidence of electrical instability.        Interpretation:  This is an abnormal examination. 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 Amp Diff Segment Distance Velocity Neg. Dur Neg Area Diff Temperature Comment   Site (ms) Norm (mV) Norm %  cm m/s Norm ms %  C    Right Dp Branch Fibular (TA) Motor   Fib Head 3.8  < 6.0 4.4 -      -  31.8    Pop Fossa 5.1  < 5.7 4.3 - -2.3 Pop Fossa-Fib Head 6.2 48 - 10.3 - 31.8    Left Fibular (EDB) Motor   Ankle 6.1  < 6.0 0.50  > 2.0  Ankle-EDB 8   6.6  30.8    Right Fibular (EDB) Motor   Ankle 5.1  < 6.0 0.97  > 2.0  Ankle-EDB 8   4.9  31.7    Bel Fib Head 14.3 - 0.95 - -2.1 Bel Fib Head-Ankle 31.7 34  > 38 4.8 -22.2 31.9    Pop Fossa 16.1 - 0.93 - -2.1 Pop Fossa-Bel Fib Head 6.1 34  > 38 5.1 0 31.9    Right Median (APB) Motor   Wrist 3.8  < 4.4 5.2  > 5.0  Wrist-APB 8   4.0  33.4    Elbow 7.7 - 4.1  > 5.0 -21.2 Elbow-Wrist 22.8 58  > 48 4.8 -3.4 33.4    Right Median/Ulnar (Lumb-Dors Int II) Motor        Median  (Lumb I)   Wrist 3.0 - 1.11 -  Wrist-Lumb I 10   5.3  33.3         Ulnar (Dorsal Int (manus))   Wrist 2.7 - 2.8 -  Wrist-Dorsal Int (manus) 10   4.9  33.3    Left Tibial (AHB) Motor   Ankle 4.3  < 6.5 2.1  > 5.0  Ankle-AHB 8   6.1  31    Right Tibial (AHB) Motor   Ankle 4.7  < 6.5 3.6  > 5.0  Ankle-AHB 8   5.6  31.9    Knee 16.7 - 3.7 - 2.8 Knee-Ankle 35 29  > 38 4.5 -11.7 32    Right Ulnar (ADM) Motor   Wrist 2.8  < 3.5 8.2  > 5.0  Wrist-ADM 8   4.7  33.4    Bel Elbow 6.1 - 7.0 - -14.6 Bel Elbow-Wrist 19 58  > 48 4.8 -7.0 33.4    Abv Elbow 8.3 - 6.1 - -12.9 Abv Elbow-Bel Elbow 11.7 53  > 48 5.1 -6.2 33.3      Sensory Sites      Onset Lat Peak Lat Amp (O-P) Amp (P-P) Segment Distance Velocity Temperature Comment   Site ms ms  V Norm  V  cm m/s Norm  C    Right Median Sensory   Wrist-Dig II 2.6 3.5 13  > 10 22 Wrist-Dig II 14 54  > 48 33.2    Right Radial Sensory   Forearm-Wrist 1.68 2.3 20  > 15 24 Forearm-Wrist 10 60 - 33.4    Left Superficial Fibular Sensory   14 cm-Ankle 3.2 4.2 3  > 3 2 14 cm-Ankle 12.5 39  > 38 30.7    Right Superficial Fibular Sensory   14 cm-Ankle 3.1 3.9 2  > 3 3 14 cm-Ankle 12.5 40  > 38 31.9    Left Sural Sensory   Calf-Lat Mall 3.7 4.6 5  > 5 2 Calf-Lat Mall 14 38  > 38 31    Right Sural Sensory   Calf-Lat Mall 3.5 4.3 4  > 5 4 Calf-Lat Mall 14 40  > 38 31.6    Right Ulnar Sensory   Wrist-Dig V 2.7 3.5 12  > 8 33 Wrist-Dig V 12.5 46  > 48 33.3        Electromyography     Side Muscle Ins Act Fibs/PSW Fasc HF Amp Dur Poly Recrt Int Pat   Right Tib Anterior Nml None Nml 0 2+ 2+ 0 Brandon Nml   Right Gastroc Nml None Nml 0 2+ 2+ 0 Brandon Nml   Right Vastus Med Nml None Nml 0 Nml Nml 0 Nml Nml   Right Deltoid Nml None Nml 0 Nml Nml 0 Nml Nml   Right Triceps Nml None Nml 0 Nml Nml 0 Nml Nml   Right FDI Nml None Nml 0 Nml Nml 0 Nml Nml   Right Gluteus Med Nml None Nml CRD 1+ 1+ 0 Brandon Nml         NCS Waveforms:    Motor                           Sensory                               Again,  thank you for allowing me to participate in the care of your patient.        Sincerely,        Dennis Reed MD

## 2023-08-11 DIAGNOSIS — C67.9 UROTHELIAL CARCINOMA OF BLADDER (H): ICD-10-CM

## 2023-08-11 RX ORDER — DOCUSATE SODIUM AND SENNOSIDES 8.6; 5 MG/1; MG/1
TABLET, FILM COATED ORAL
Qty: 100 TABLET | Refills: 0 | Status: SHIPPED | OUTPATIENT
Start: 2023-08-11 | End: 2024-03-20

## 2023-08-19 ENCOUNTER — HEALTH MAINTENANCE LETTER (OUTPATIENT)
Age: 73
End: 2023-08-19

## 2023-08-23 ENCOUNTER — VIRTUAL VISIT (OUTPATIENT)
Dept: ONCOLOGY | Facility: CLINIC | Age: 73
End: 2023-08-23
Attending: INTERNAL MEDICINE
Payer: COMMERCIAL

## 2023-08-23 VITALS
HEIGHT: 66 IN | SYSTOLIC BLOOD PRESSURE: 120 MMHG | BODY MASS INDEX: 21.69 KG/M2 | WEIGHT: 135 LBS | DIASTOLIC BLOOD PRESSURE: 60 MMHG

## 2023-08-23 DIAGNOSIS — C67.9 UROTHELIAL CARCINOMA OF BLADDER WITH INVASION OF MUSCLE (H): ICD-10-CM

## 2023-08-23 DIAGNOSIS — G62.9 NEUROPATHY: Primary | ICD-10-CM

## 2023-08-23 PROCEDURE — 99214 OFFICE O/P EST MOD 30 MIN: CPT | Mod: 95 | Performed by: INTERNAL MEDICINE

## 2023-08-23 ASSESSMENT — PAIN SCALES - GENERAL: PAINLEVEL: MODERATE PAIN (5)

## 2023-08-23 NOTE — NURSING NOTE
Is the patient currently in the state of [place]? YES    Visit mode:VIDEO    If the visit is dropped, the patient can be reconnected by: VIDEO VISIT: Text to cell phone:   Telephone Information:   Mobile [phone]       Will anyone else be joining the visit? NO  (If patient encounters technical issues they should call [phone]    How would you like to obtain your AVS? MyChart    Are changes needed to the allergy or medication list? No    Reason for visit: RECHECK    Lillie OATES

## 2023-08-23 NOTE — PROGRESS NOTES
[facility] Hematology / Oncology  Progress Note 2023  Name: Michaela Dorman  :  1950    MRN:  [id number]    --------------------    Assessment / Plan:  Left-sided ER-, HER2+ breast CA:  # Sep 2016 Presented w/ left axillary mass; staging multicentric T4 lesion w/ geraldine involvement; biopsy w/ ER-, HER2+ breast cancer  # Sep 2016 - 2017 Neoadjuvant AC x 4 cycles/TH x 4 cycles.  # 2017 Bilateral mastectomy no with geraldine evaluation; complete path CR from invasive cancer; 7 mm DCIS residual.  # 2017 - May 2017 Adjuvant radiation to left chestwall / axilla.  # 2017 - Dec 2017 Adjuvant Herceptin.    Bladder cancer:([id number])  # Oct 2020 Presented w/ dysuria.  # 2021 Cytoscopy w/ muscle-invasive high grade urothelial cancer.  # Mar 2021 TURBT.  # 2021 Neoadjuvant cisplatin/gem split dose complicated by TAMIR.  # May 2021 Neoadjuvant carboplatin/gem.  # 2021 Radical cystectomy w/ ileal conduit; path high grade urothelial carcinoma muscle invasive; margins negs; nodes negative.  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    Status post course of prednisone for immunotherapy related neuropathy.  Continue gabapentin 300 mg 3 times daily for neuropathic symptoms; given improvement, I have given Lissa the greenlight to begin weaning gabapentin.  We reviewed close monitoring for recurrence of neuropathic symptoms; if recurrent, may need longer course of low-dose prednisone.  Appreciate Dr. Reed's and Phani Jason's assistance.    Follow-up / Orders:  [facility] [place] 3 months w/ labs (CBC, CMP, TSH) and CT CAP.    Girish Sauer MD    --------------------    Interval History:  Michaela returns for follow up of immunotherapy related neuropathy.  Since our last visit, she has done well from a neuropathy standpoint.  She completed her course of  prednisone with about a 75% improvement in her neuropathic symptoms.  She is now off prednisone and feeling markedly better.  She remains on gabapentin 300 mg 3 times daily.  She is active.  She is swimming and fishing with his grandsons.  She is feeling like her old self again..    Continuity of Care:  Discussed case w/ Dr. Reed.    Social History:  Social History     Tobacco Use    Smoking status: Former     Packs/day: 3.00     Years: 10.00     Pack years: 30.00     Types: Cigarettes     Quit date: 1979     Years since quittin.7    Smokeless tobacco: Never    Tobacco comments:     approx. 3 packs daily   Vaping Use    Vaping Use: Never used   Substance Use Topics    Alcohol use: Not Currently     Comment: Stopped drinking a few weeks ago (2023)    Drug use: No       --------------------    Physical Exam:  Video visit.    Labs / Imaging:  Reviewed cortisol, acetylcholine esterase receptor studies, SPEP, serum immunofixation, B12.  Reviewed brain MRI and spinal MRI.    Video Visit:  Michaela is a 72 year old female who is being evaluated via a billable video visit.  }    Video start time:  8:11 AM  Video end time:  8:26 AM    [facility]  Patient location: Home    Mode of transmission:  Flasma / [employer] One.

## 2023-08-26 ENCOUNTER — MYC MEDICAL ADVICE (OUTPATIENT)
Dept: ONCOLOGY | Facility: CLINIC | Age: 73
End: 2023-08-26

## 2023-08-26 ENCOUNTER — VIRTUAL VISIT (OUTPATIENT)
Dept: URGENT CARE | Facility: CLINIC | Age: 73
End: 2023-08-26
Payer: COMMERCIAL

## 2023-08-26 ENCOUNTER — NURSE TRIAGE (OUTPATIENT)
Dept: NURSING | Facility: CLINIC | Age: 73
End: 2023-08-26
Payer: COMMERCIAL

## 2023-08-26 DIAGNOSIS — Z91.199 NO-SHOW FOR APPOINTMENT: Primary | ICD-10-CM

## 2023-08-26 DIAGNOSIS — U07.1 INFECTION DUE TO 2019 NOVEL CORONAVIRUS: Primary | ICD-10-CM

## 2023-08-26 PROCEDURE — 99207 PR NO CHARGE LOS: CPT | Mod: VID | Performed by: NURSE PRACTITIONER

## 2023-08-26 NOTE — TELEPHONE ENCOUNTER
Last Monday 8/21/23 night in the middle of the night got very very sick with nausea, vomiting and diarrhea.   Has been taking lorazepam to help with her symptoms.  Her symptoms have improved.  Took a covid test this a.m. and it was positive.    Patient asked what she needs to do next.  She wanted Dr Sauer, or the on call paged.  Per OneNote, I paged and asked to have the on call provider for Dr Sauer paged to call patient.  I was told Dr Cuellar is the on call provider.    I advised Lissa to call us back if she hasn't heard back from the on call by 11:15 a.m.  Caller stated understanding and agreement.    Susanne STANLEY RN [facility]

## 2023-08-26 NOTE — TELEPHONE ENCOUNTER
Patient calling back because she did not receive a call yet. Triaged the patient and was able to connect to scheduling for a VV.    Gather patient reported symptoms   Assessment   Current Symptoms at time of phone call, reported by patient: (if no symptoms, document: No symptoms] Nausea, congestion, cough, headache   Date of symptom(s) onset (if applicable) 08/21     If at time of call, Patients symptoms have worsened, the Patient should contact 911 or have someone drive them to Emergency Dept promptly:    If Patient calling 911, inform 911 personal that you have tested positive for the Coronavirus (COVID-19).  Place mask on and await 911 to arrive.  If Emergency Dept, If possible, please have another adult drive you to the Emergency Dept but you need to wear mask when in contact with other people.      Treatment Options:   Is patient interested in discussing COVID treatment? Yes.   Is this a [place] or [place] Patient? No:     Are you an agent trained to scheduling? No.   COVID Positive/Requesting COVID treatment    Patient is positive for COVID and requesting treatment options.    Date of positive COVID test (PCR or at home)? 08/26  Current COVID symptoms: cough, headache, congestion or runny nose, and nausea or vomiting  Date COVID symptoms began: 08/21    Patient an oncology patient and on day 5 of symptoms. Connected to scheduling to schedule a VV for the patient.     Review information with Patient    Your result was positive. This means you have COVID-19 (coronavirus).    How can I protect others?    These guidelines are for isolating before returning to work, school or .    If you DO have symptoms  Stay home and away from others   For at least 5 days after your symptoms started, AND  You are fever free for 24 hours (with no medicine that reduces fever), AND  Your other symptoms are better  Wear a mask for 10 full days anytime you are around others    If you DON'T have symptoms  Stay home and away  from others for at least 5 days after your positive test  Wear a mask for 10 full days anytime you are around others    There may be different guidelines for healthcare facilities.  Please check with the specific sites before arriving.    If you have been told by a doctor that you were severely ill with COVID-19 or are immunocompromised, you should isolate for at least 10 days.    You should not go back to work until you meet the guidelines above for ending your home isolation. You don't need to be retested for COVID-19 before going back to work--studies show that you won't spread the virus if it's been at least 10 days since your symptoms started (or 20 days, if you have a weak immune system).    Employers, schools, and daycares: This is an official notice for this person's medical guidelines for returning in-person.  They must meet the above guidelines before going back to work, school or  in person.    You will receive a positive COVID-19 letter via "[employer]" or the mail soon with additional self-care information.    Would you like me to review some of that information with you now?  No    If you were tested for an upcoming procedure, please contact your provider for next steps.    Jose Vidal RN    Reason for Disposition   [1] HIGH RISK patient (e.g., weak immune system, age > 64 years, obesity with BMI 30 or higher, pregnant, chronic lung disease or other chronic medical condition) AND [2] COVID symptoms (e.g., cough, fever)  (Exceptions: Already seen by PCP and no new or worsening symptoms.)    Additional Information   Negative: SEVERE difficulty breathing (e.g., struggling for each breath, speaks in single words)   Negative: Difficult to awaken or acting confused (e.g., disoriented, slurred speech)   Negative: Bluish (or gray) lips or face now   Negative: Shock suspected (e.g., cold/pale/clammy skin, too weak to stand, low BP, rapid pulse)   Negative: Sounds like a life-threatening emergency to the  triager   Negative: SEVERE or constant chest pain or pressure  (Exception: Mild central chest pain, present only when coughing.)   Negative: MODERATE difficulty breathing (e.g., speaks in phrases, SOB even at rest, pulse 100-120)   Negative: [1] Headache AND [2] stiff neck (can't touch chin to chest)   Negative: Oxygen level (e.g., pulse oximetry) 90 percent or lower   Negative: Chest pain or pressure  (Exception: MILD central chest pain, present only when coughing.)   Negative: [1] Drinking very little AND [2] dehydration suspected (e.g., no urine > 12 hours, very dry mouth, very lightheaded)   Negative: Patient sounds very sick or weak to the triager   Negative: MILD difficulty breathing (e.g., minimal/no SOB at rest, SOB with walking, pulse <100)   Negative: Fever > 103 F (39.4 C)   Negative: [1] Fever > 101 F (38.3 C) AND [2] age > 60 years   Negative: [1] Fever > 100.0 F (37.8 C) AND [2] bedridden (e.g., CVA, chronic illness, recovering from surgery)   Negative: Oxygen level (e.g., pulse oximetry) 91 to 94 percent    Protocols used: Coronavirus (COVID-19) Diagnosed or [id number]

## 2023-09-03 ENCOUNTER — APPOINTMENT (OUTPATIENT)
Dept: GENERAL RADIOLOGY | Facility: CLINIC | Age: 73
End: 2023-09-03
Attending: EMERGENCY MEDICINE
Payer: MEDICARE

## 2023-09-03 ENCOUNTER — HOSPITAL ENCOUNTER (EMERGENCY)
Facility: CLINIC | Age: 73
Discharge: HOME | End: 2023-09-03
Attending: EMERGENCY MEDICINE | Admitting: EMERGENCY MEDICINE
Payer: MEDICARE

## 2023-09-03 ENCOUNTER — APPOINTMENT (OUTPATIENT)
Dept: CT IMAGING | Facility: CLINIC | Age: 73
End: 2023-09-03
Attending: EMERGENCY MEDICINE
Payer: MEDICARE

## 2023-09-03 VITALS
OXYGEN SATURATION: 97 % | HEIGHT: 66 IN | SYSTOLIC BLOOD PRESSURE: 126 MMHG | HEART RATE: 93 BPM | RESPIRATION RATE: 18 BRPM | TEMPERATURE: 98.1 F | BODY MASS INDEX: 21.53 KG/M2 | WEIGHT: 134 LBS | DIASTOLIC BLOOD PRESSURE: 85 MMHG

## 2023-09-03 DIAGNOSIS — R53.83 MALAISE AND FATIGUE: ICD-10-CM

## 2023-09-03 DIAGNOSIS — R53.81 MALAISE AND FATIGUE: ICD-10-CM

## 2023-09-03 LAB
ALBUMIN SERPL BCG-MCNC: 4.2 G/DL (ref 3.5–5.2)
ALP SERPL-CCNC: 75 U/L (ref 35–104)
ALT SERPL W P-5'-P-CCNC: 18 U/L (ref 0–50)
ANION GAP SERPL CALCULATED.3IONS-SCNC: 16 MMOL/L (ref 7–15)
AST SERPL W P-5'-P-CCNC: 22 U/L (ref 0–45)
BASOPHILS # BLD AUTO: 0.1 10E3/UL (ref 0–0.2)
BASOPHILS NFR BLD AUTO: 1 %
BILIRUB SERPL-MCNC: 0.2 MG/DL
BUN SERPL-MCNC: 30.1 MG/DL (ref 8–23)
CALCIUM SERPL-MCNC: 10.3 MG/DL (ref 8.8–10.2)
CHLORIDE SERPL-SCNC: 102 MMOL/L (ref 98–107)
CREAT SERPL-MCNC: 1.02 MG/DL (ref 0.51–0.95)
D DIMER PPP FEU-MCNC: 1.81 UG/ML FEU (ref 0–0.5)
DEPRECATED HCO3 PLAS-SCNC: 19 MMOL/L (ref 22–29)
EOSINOPHIL # BLD AUTO: 0.4 10E3/UL (ref 0–0.7)
EOSINOPHIL NFR BLD AUTO: 4 %
ERYTHROCYTE [DISTWIDTH] IN BLOOD BY AUTOMATED COUNT: 13.7 % (ref 10–15)
GFR SERPL CREATININE-BSD FRML MDRD: 58 ML/MIN/1.73M2
GLUCOSE SERPL-MCNC: 92 MG/DL (ref 70–99)
HCT VFR BLD AUTO: 35.6 % (ref 35–47)
HGB BLD-MCNC: 11.8 G/DL (ref 11.7–15.7)
IMM GRANULOCYTES # BLD: 0.1 10E3/UL
IMM GRANULOCYTES NFR BLD: 1 %
LYMPHOCYTES # BLD AUTO: 2 10E3/UL (ref 0.8–5.3)
LYMPHOCYTES NFR BLD AUTO: 21 %
MAGNESIUM SERPL-MCNC: 1.5 MG/DL (ref 1.7–2.3)
MCH RBC QN AUTO: 33.1 PG (ref 26.5–33)
MCHC RBC AUTO-ENTMCNC: 33.1 G/DL (ref 31.5–36.5)
MCV RBC AUTO: 100 FL (ref 78–100)
MONOCYTES # BLD AUTO: 0.8 10E3/UL (ref 0–1.3)
MONOCYTES NFR BLD AUTO: 8 %
NEUTROPHILS # BLD AUTO: 6.1 10E3/UL (ref 1.6–8.3)
NEUTROPHILS NFR BLD AUTO: 65 %
NRBC # BLD AUTO: 0 10E3/UL
NRBC BLD AUTO-RTO: 0 /100
PLATELET # BLD AUTO: 379 10E3/UL (ref 150–450)
POTASSIUM SERPL-SCNC: 4.6 MMOL/L (ref 3.4–5.3)
PROT SERPL-MCNC: 7.3 G/DL (ref 6.4–8.3)
RBC # BLD AUTO: 3.56 10E6/UL (ref 3.8–5.2)
SODIUM SERPL-SCNC: 137 MMOL/L (ref 136–145)
WBC # BLD AUTO: 9.3 10E3/UL (ref 4–11)

## 2023-09-03 PROCEDURE — 96374 THER/PROPH/DIAG INJ IV PUSH: CPT | Mod: 59 | Performed by: EMERGENCY MEDICINE

## 2023-09-03 PROCEDURE — 258N000003 HC RX IP 258 OP 636: Performed by: EMERGENCY MEDICINE

## 2023-09-03 PROCEDURE — 250N000011 HC RX IP 250 OP 636: Performed by: EMERGENCY MEDICINE

## 2023-09-03 PROCEDURE — 85025 COMPLETE CBC W/AUTO DIFF WBC: CPT | Performed by: EMERGENCY MEDICINE

## 2023-09-03 PROCEDURE — 36415 COLL VENOUS BLD VENIPUNCTURE: CPT | Performed by: EMERGENCY MEDICINE

## 2023-09-03 PROCEDURE — 250N000009 HC RX 250: Performed by: EMERGENCY MEDICINE

## 2023-09-03 PROCEDURE — 80053 COMPREHEN METABOLIC PANEL: CPT | Performed by: EMERGENCY MEDICINE

## 2023-09-03 PROCEDURE — 71045 X-RAY EXAM CHEST 1 VIEW: CPT

## 2023-09-03 PROCEDURE — 85379 FIBRIN DEGRADATION QUANT: CPT | Performed by: EMERGENCY MEDICINE

## 2023-09-03 PROCEDURE — 96361 HYDRATE IV INFUSION ADD-ON: CPT | Performed by: EMERGENCY MEDICINE

## 2023-09-03 PROCEDURE — 71275 CT ANGIOGRAPHY CHEST: CPT | Mod: ME

## 2023-09-03 PROCEDURE — 250N000011 HC RX IP 250 OP 636: Mod: JZ | Performed by: EMERGENCY MEDICINE

## 2023-09-03 PROCEDURE — 99285 EMERGENCY DEPT VISIT HI MDM: CPT | Mod: 25 | Performed by: EMERGENCY MEDICINE

## 2023-09-03 PROCEDURE — 99284 EMERGENCY DEPT VISIT MOD MDM: CPT | Performed by: EMERGENCY MEDICINE

## 2023-09-03 PROCEDURE — 83735 ASSAY OF MAGNESIUM: CPT | Performed by: EMERGENCY MEDICINE

## 2023-09-03 RX ORDER — HEPARIN SODIUM (PORCINE) LOCK FLUSH IV SOLN 100 UNIT/ML 100 UNIT/ML
5-10 SOLUTION INTRAVENOUS
Status: DISCONTINUED | OUTPATIENT
Start: 2023-09-03 | End: 2023-09-03 | Stop reason: HOSPADM

## 2023-09-03 RX ORDER — ONDANSETRON 2 MG/ML
4 INJECTION INTRAMUSCULAR; INTRAVENOUS EVERY 30 MIN PRN
Status: DISCONTINUED | OUTPATIENT
Start: 2023-09-03 | End: 2023-09-03 | Stop reason: HOSPADM

## 2023-09-03 RX ORDER — PREDNISONE 5 MG/1
TABLET ORAL
Qty: 91 TABLET | Refills: 0 | Status: SHIPPED | OUTPATIENT
Start: 2023-09-03 | End: 2023-10-29

## 2023-09-03 RX ORDER — IOPAMIDOL 755 MG/ML
500 INJECTION, SOLUTION INTRAVASCULAR ONCE
Status: COMPLETED | OUTPATIENT
Start: 2023-09-03 | End: 2023-09-03

## 2023-09-03 RX ADMIN — SODIUM CHLORIDE 70 ML: 9 INJECTION, SOLUTION INTRAVENOUS at 12:38

## 2023-09-03 RX ADMIN — HEPARIN 5 ML: 100 SYRINGE at 13:58

## 2023-09-03 RX ADMIN — IOPAMIDOL 60 ML: 755 INJECTION, SOLUTION INTRAVENOUS at 12:39

## 2023-09-03 RX ADMIN — ONDANSETRON 4 MG: 2 INJECTION INTRAMUSCULAR; INTRAVENOUS at 11:33

## 2023-09-03 RX ADMIN — SODIUM CHLORIDE 1000 ML: 9 INJECTION, SOLUTION INTRAVENOUS at 11:31

## 2023-09-03 ASSESSMENT — ACTIVITIES OF DAILY LIVING (ADL)
ADLS_ACUITY_SCORE: 33
ADLS_ACUITY_SCORE: 35

## 2023-09-03 NOTE — ED PROVIDER NOTES
History     Chief Complaint   Patient presents with    Shortness of Breath     HPI  Michaela Dorman is a 72 year old female who to the emergency room for concern of ongoing fatigue, weakness, and symptoms related to COVID-19.  She tested positive for COVID 2 weeks ago.  She had talked with her doctor, who had prescribed oral Paxlovid, and she initiated this on day 5 of her symptoms.  She said that she took the medication, but did not feel any better.  She continues to have ongoing shortness of breath.  She used to be able to walk 3 miles daily, and now just going from the living room to the kitchen she needs to stop and catch her breath.  She feels extremely weak, and is not taking in much fluid or much to eat.  Says that she has an ongoing queasiness and unsettled feeling in her stomach, and just does not have much of an appetite.  Has not been vomiting.  Does not think that she has been running a fever, but does note intermittent chills.  Denies chest pain or abdominal pain.  Does have a history of breast cancer and bladder cancer, is status post ileostomy.  She says she is currently in remission.  Still having good urine output despite poor p.o. intake.    Allergies:  Allergies   Allergen Reactions    Oxybutynin      Patient reports symptoms of nausea and mind fogginess       Problem List:    Patient Active Problem List    Diagnosis Date Noted    Chronic fatigue syndrome 07/07/2023     Priority: Medium    Vision changes 07/07/2023     Priority: Medium    Balance problems 07/07/2023     Priority: Medium    Skin sensation disturbance 07/07/2023     Priority: Medium    Ileostomy status (H) 07/07/2023     Priority: Medium    Acquired hypothyroidism 07/07/2023     Priority: Medium    Hypothyroidism due to medication 07/07/2023     Priority: Medium    Pulmonary nodules 07/07/2023     Priority: Medium    F11.2 - Continuous opioid dependence (H) 07/07/2023     Priority: Medium    Status post ileal conduit (H)  09/12/2022     Priority: Medium    Status of artificial opening of urinary tract (H) 08/19/2022     Priority: Medium    Rheumatoid arthritis of multiple sites with negative rheumatoid factor (H) 02/25/2022     Priority: Medium    Parathyroid abnormality (H) 02/25/2022     Priority: Medium    Hypomagnesemia 11/18/2021     Priority: Medium    Need for prophylactic vaccination and inoculation against influenza 11/18/2021     Priority: Medium    Constipation, unspecified constipation type 09/28/2021     Priority: Medium    Imaging abnormalities 09/28/2021     Priority: Medium    Anemia in neoplastic disease 05/21/2021     Priority: Medium    Chemotherapy-induced neutropenia (H) 05/19/2021     Priority: Medium    Thrombocytopenia (H) 05/19/2021     Priority: Medium    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) 04/07/2021     Priority: Medium     Check 11.21 for f/u Marty  Added automatically from request for surgery [id number]      Malignant neoplasm of overlapping sites of bladder (H) 03/08/2021     Priority: Medium    Personal history of malignant neoplasm of bladder 03/04/2021     Priority: Medium     Added automatically from request for surgery [id number]      Benign paroxysmal positional vertigo, bilateral 12/24/2020     Priority: Medium    Personal history of malignant neoplasm of breast 10/01/2020     Priority: Medium    Acute cystitis with hematuria 10/01/2020     Priority: Medium    Symptomatic stenosis of left carotid artery 09/22/2020     Priority: Medium     Added automatically from request for surgery [id number]      POSSIBLE Right internal carotid artery aneurysm 09/08/2020     Priority: Medium    Carotid stenosis, bilateral L80%, R40% 09/02/2020     Priority: Medium    Carotid artery plaque, bilateral 09/02/2020     Priority: Medium    Anemia 07/16/2020     Priority: Medium    S/P lumbar fusion 02/04/2020     Priority: Medium    Bilateral impacted cerumen 01/13/2020     Priority: Medium    Stage  3a chronic kidney disease (H) 09/09/2019     Priority: Medium    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      Central stenosis of spinal canal 12/01/2017     Priority: Medium     lumbar        DDD (degenerative disc disease), lumbar 12/01/2017     Priority: Medium    Chronic pain syndrome 11/30/2017     Priority: Medium     substancePatient is followed by Phani Jason PA-C for ongoing prescription of pain medication.  All refills should only be approved by this provider, or covering partner.    Medication(s): Oxycodone IR 5mg.   Maximum quantity per month: 20  Clinic visit frequency required: Q 3 months     Controlled substance agreement:  Encounter-Level CSA:       There are no encounter-level csa.        Pain Clinic evaluation in the past: Yes       Date/Location:      DIRE Total Score(s):  No flowsheet data found.    Last [facility] website verification:  none   [email or web address]            Lumbar radiculopathy 11/30/2017     Priority: Medium    S/P reverse total shoulder arthroplasty, right 06/05/2017     Priority: Medium    Prophylactic antibiotic for dental procedure indicated due to prior joint replacement 06/05/2017     Priority: Medium    Hyperlipidemia LDL goal <130 05/30/2017     Priority: Medium    Anxiety 03/29/2017     Priority: Medium    S/P bilateral mastectomy 02/08/2017     Priority: Medium    Encounter for antineoplastic chemotherapy 10/07/2016     Priority: Medium    Malignant neoplasm of upper-outer quadrant of left breast in female, estrogen receptor negative (H) 10/06/2016     Priority: Medium    Arthritis of shoulder region, right 11/17/2015     Priority: Medium    Adjustment disorder with  depressed mood 11/11/2015     Priority: Medium    Baker's cyst of knee 02/09/2015     Priority: Medium    Patella-femoral syndrome 02/09/2015     Priority: Medium    Osteoarthrosis, hip 02/05/2013     Priority: Medium    Tinnitus of right ear 02/05/2013     Priority: Medium    Hypertension goal BP (blood pressure) < 140/90 10/04/2011     Priority: Medium    Health Care Home 09/29/2011     Priority: Medium     x  DX V65.8 REPLACED WITH 13124 HEALTH CARE HOME (04/08/2013)      Advanced directives, counseling/discussion 08/22/2011     Priority: Medium     Advance Directive Problem List Overview:   Name Relationship Phone    Primary Health Care Agent            Alternative Health Care Agent          Patient states has Advance Directive and will bring in a copy to clinic. 8/22/2011         Trochanteric bursitis 01/06/2009     Priority: Medium    Family history of stroke (cerebrovascular) 03/07/2008     Priority: Medium    Enthesopathy of hip region 01/30/2006     Priority: Medium        Past Medical History:    Past Medical History:   Diagnosis Date    Acute kidney injury (H)     Carotid stenosis, bilateral L80%, R40% 09/02/2020    Central stenosis of spinal canal 12/01/2017    DDD (degenerative disc disease), lumbar 12/01/2017    Depressive disorder, not elsewhere classified     Esophageal reflux     ETOH abuse     Foraminal stenosis of lumbar region 12/01/2017    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    Subdural hematoma (H)     Thyroid disease     Urothelial carcinoma (H)        Past Surgical History:    Past Surgical History:   Procedure Laterality Date    ARTHROPLASTY SHOULDER Right 11/17/2015    ARTHROSCOPY KNEE  6/7/2012    Procedure:ARTHROSCOPY KNEE; right knee arthroscopy with partial lateral menisectomy;  Surgeon:DAMIÁN MCALLISTER; Location:[facility]    BIOPSY VAGINAL N/A 10/27/2021    Procedure: DISTAL URETHRECTOMY;  Surgeon: Leonardo Robles MD;  Location: [facility] OR    COLONOSCOPY N/A 12/22/2017    Procedure: COLONOSCOPY;  colonoscopy;  Surgeon: Chuck Chand MD;  Location: [facility]    CYSTECTOMY BLADDER RADICAL, ILEAL DIVERSION, COMBINED N/A 6/1/2021    Procedure: RADICAL CYSTECTOMY  WITH ILEAL CONDUIT CREATION,BILATERAL PELVIC LYMPHADENECTOMY;  Surgeon: Leonardo Robles MD;  Location: [facility] OR    CYSTOSCOPY, RETROGRADES, COMBINED Bilateral 3/18/2021    Procedure: CYSTOSCOPY, WITH RETROGRADE PYELOGRAM;  Surgeon: Girish Jack MD;  Location: [facility]    CYSTOSCOPY, TRANSURETHRAL RESECTION (TUR) TUMOR BLADDER, COMBINED N/A 3/18/2021    Procedure: CYSTOSCOPY, WITH TRANSURETHRAL RESECTION BLADDER TUMOR;  Surgeon: Girish Jack MD;  Location: [facility]    ENDARTERECTOMY CAROTID Left 10/8/2020    Procedure: LEFT CAROTID ENDARTERECTOMY WITH EEG;  Surgeon: Lacho Jasmine MD;  Location: [facility]    ESOPHAGOSCOPY, GASTROSCOPY, DUODENOSCOPY (EGD), COMBINED N/A 6/20/2022    Procedure: ESOPHAGOGASTRODUODENOSCOPY, WITH BIOPSY;  Surgeon: Ramses Arenas MD;  Location: [facility]    EXCISE MASS AXILLA Left 9/16/2016    Procedure: EXCISE MASS AXILLA;  Surgeon: Keyon Blanco MD;  [facility]: [facility]    HC REMV CATARACT EXTRACAP,INSERT LENS, W/O ECP  6/25/2009    Right eye    INJECT EPIDURAL LUMBAR N/A 4/11/2019    Procedure: interlaminar epidual steroid injection lumbar 4-5;  Surgeon: Oskar Salvador MD;  [facility]    INJECT EPIDURAL LUMBAR Bilateral 9/12/2019    Procedure: lumbar 4-5 epidural interlaminar steroid injection;  Surgeon: Oskar Salvador MD;  [facility]    INSERT PORT VASCULAR ACCESS Right 10/7/2016    Procedure: INSERT PORT VASCULAR ACCESS;  Surgeon: Keyon Blanco MD;  [facility]    IR CHEST PORT PLACEMENT > 5 YRS OF AGE  4/16/2021    MASTECTOMY SIMPLE BILATERAL Bilateral 2/8/2017     Procedure: MASTECTOMY SIMPLE BILATERAL;  Surgeon: Keyon Blanco MD;  [facility]    OPEN REDUCTION INTERNAL FIXATION FOOT Right 3/20/2015    Procedure: OPEN REDUCTION INTERNAL FIXATION FOOT;  Surgeon: Javi Herrmann DPM;  [facility]    OPTICAL TRACKING SYSTEM FUSION SPINE POSTERIOR LUMBAR TWO LEVELS N/A 2020    Procedure: LUMBAR 2-SACRAL1 TRANSFORMINAL INTERBODY FUSION;  Surgeon: Lenny Gomes MD;  Location: [facility]    REMOVE PORT VASCULAR ACCESS Right 2018    Procedure: REMOVE PORT VASCULAR ACCESS;  right intra jugular port removal;  Surgeon: Keyon Blanco MD;  [facility]    SHOULDER SURGERY Right 2015    ZZC LIGATE FALLOPIAN TUBE      ZZC NONSPECIFIC PROCEDURE      ankle fracture surgery       Family History:    Family History   Problem Relation Age of Onset    Hypertension Mother     Thyroid Disease Mother     Cerebrovascular Disease Mother     Hypertension Father     Cerebrovascular Disease Father     Cancer Father         skin    Thyroid Disease Sister     Diabetes Brother         type 2    Depression Sister     Breast Cancer Sister         age 47    Cancer Sister         skin    Cancer Brother         inside nose       Social History:  Marital Status:   [2]  Social History     Tobacco Use    Smoking status: Former     Packs/day: 3.00     Years: 10.00     Pack years: 30.00     Types: Cigarettes     Quit date: 1979     Years since quittin.7    Smokeless tobacco: Never    Tobacco comments:     approx. 3 packs daily   Vaping Use    Vaping Use: Never used   Substance Use Topics    Alcohol use: Not Currently     Comment: Stopped drinking a few weeks ago (2023)    Drug use: No        Medications:    predniSONE (DELTASONE) 5 MG tablet  acetaminophen (TYLENOL) 500 MG tablet  ALPRAZolam (XANAX) 0.5 MG tablet  aspirin (ASA) 81 MG chewable tablet  carvedilol (COREG) 6.25 MG tablet  Cyanocobalamin (B-12 PO)  escitalopram (LEXAPRO) 20 MG tablet  gabapentin  "(NEURONTIN) 300 MG capsule  levothyroxine (SYNTHROID/LEVOTHROID) 25 MCG tablet  LORazepam (ATIVAN) 0.5 MG tablet  meclizine 25 MG CHEW  methylPREDNISolone (MEDROL DOSEPAK) 4 MG tablet therapy pack  omeprazole (PRILOSEC) 20 MG DR capsule  oxyCODONE-acetaminophen (PERCOCET) 5-325 MG tablet  oxyCODONE-acetaminophen (PERCOCET) 5-325 MG tablet  prochlorperazine (COMPAZINE) 10 MG tablet  STOOL SOFTENER/LAXATIVE 50-8.6 MG tablet          Review of Systems   All other systems reviewed and are negative.      Physical Exam   BP: 126/85  Pulse: 93  Temp: 98.1  F (36.7  C)  Resp: 18  Height: 167.6 cm (5' 6\")  Weight: 60.8 kg (134 lb)  SpO2: 97 %      Physical Exam  Vitals and nursing note reviewed.   Constitutional:       General: She is not in acute distress.     Appearance: She is not diaphoretic.   HENT:      Head: Normocephalic.   Cardiovascular:      Rate and Rhythm: Normal rate and regular rhythm.   Pulmonary:      Effort: Pulmonary effort is normal.      Breath sounds: Normal breath sounds.   Abdominal:      General: Bowel sounds are normal.      Palpations: Abdomen is soft.      Tenderness: There is no abdominal tenderness.   Skin:     General: Skin is warm and dry.   Neurological:      General: No focal deficit present.      Mental Status: She is alert.      Motor: No weakness.         ED Course                 Procedures              Critical Care time:  none               Results for orders placed or performed during the hospital encounter of 09/03/23 (from the past 24 hour(s))   CBC with platelets differential    Narrative    The following orders were created for panel order CBC with platelets differential.  Procedure                               Abnormality         Status                     ---------                               -----------         ------                     CBC with platelets and d...[[id number]]  Abnormal            Final result                 Please view results for these tests on the " individual orders.   Comprehensive metabolic panel   Result Value Ref Range    Sodium 137 136 - 145 mmol/L    Potassium 4.6 3.4 - 5.3 mmol/L    Chloride 102 98 - 107 mmol/L    Carbon Dioxide (CO2) 19 (L) 22 - 29 mmol/L    Anion Gap 16 (H) 7 - 15 mmol/L    Urea Nitrogen 30.1 (H) 8.0 - 23.0 mg/dL    Creatinine 1.02 (H) 0.51 - 0.95 mg/dL    Calcium 10.3 (H) 8.8 - 10.2 mg/dL    Glucose 92 70 - 99 mg/dL    Alkaline Phosphatase 75 35 - 104 U/L    AST 22 0 - 45 U/L    ALT 18 0 - 50 U/L    Protein Total 7.3 6.4 - 8.3 g/dL    Albumin 4.2 3.5 - 5.2 g/dL    Bilirubin Total 0.2 <=1.2 mg/dL    GFR Estimate 58 (L) >60 mL/min/1.73m2   Magnesium   Result Value Ref Range    Magnesium 1.5 (L) 1.7 - 2.3 mg/dL   D dimer quantitative   Result Value Ref Range    D-Dimer Quantitative 1.81 (H) 0.00 - 0.50 ug/mL FEU    Narrative    This D-dimer assay is intended for use in conjunction with a clinical pretest probability assessment model to exclude pulmonary embolism (PE) and deep venous thrombosis (DVT) in outpatients suspected of PE or DVT. The cut-off value is 0.50 ug/mL FEU.   CBC with platelets and differential   Result Value Ref Range    WBC Count 9.3 4.0 - 11.0 10e3/uL    RBC Count 3.56 (L) 3.80 - 5.20 10e6/uL    Hemoglobin 11.8 11.7 - 15.7 g/dL    Hematocrit 35.6 35.0 - 47.0 %     78 - 100 fL    MCH 33.1 (H) 26.5 - 33.0 pg    MCHC 33.1 31.5 - 36.5 g/dL    RDW 13.7 10.0 - 15.0 %    Platelet Count 379 150 - 450 10e3/uL    % Neutrophils 65 %    % Lymphocytes 21 %    % Monocytes 8 %    % Eosinophils 4 %    % Basophils 1 %    % Immature Granulocytes 1 %    NRBCs per 100 WBC 0 <1 /100    Absolute Neutrophils 6.1 1.6 - 8.3 10e3/uL    Absolute Lymphocytes 2.0 0.8 - 5.3 10e3/uL    Absolute Monocytes 0.8 0.0 - 1.3 10e3/uL    Absolute Eosinophils 0.4 0.0 - 0.7 10e3/uL    Absolute Basophils 0.1 0.0 - 0.2 10e3/uL    Absolute Immature Granulocytes 0.1 <=0.4 10e3/uL    Absolute NRBCs 0.0 10e3/uL   XR Chest Port 1 View    Narrative    EXAM:  XR CHEST PORT 1 VIEW  LOCATION: [facility]  DATE: 9/3/2023    INDICATION: Dyspnea, recent COVID  COMPARISON: 07/10/2023      Impression    IMPRESSION: No acute cardiopulmonary abnormality. Right chest port catheter terminates in the upper SVC. Partially imaged right shoulder prosthesis.   CT Chest Pulmonary Embolism w Contrast    Narrative    EXAM: CT CHEST PULMONARY EMBOLISM WITH CONTRAST  LOCATION: [facility]  DATE: 09/03/2023    INDICATION: Dyspnea. Elevated d-dimer.  COMPARISON: CT of the chest, abdomen, and pelvis performed 07/10/2023.  TECHNIQUE: CT chest pulmonary angiogram during arterial phase injection of IV contrast. Multiplanar reformats and MIP reconstructions were performed. Dose reduction techniques were used.   CONTRAST: Isovue 370, 60 mL.    FINDINGS:  ANGIOGRAM CHEST: Pulmonary arteries are normal caliber and negative for pulmonary emboli. Thoracic aorta is negative for dissection. Mild atherosclerotic calcification of the thoracic aorta.    LUNGS AND PLEURA: Mild dependent atelectasis in both lungs posteriorly. No lung masses or consolidations. No pneumothorax. No pleural effusions.    MEDIASTINUM/AXILLAE: Right Port-A-Cath, with tip in the low SVC. No enlarged lymph nodes in the chest. No pericardial effusion.    CORONARY ARTERY CALCIFICATION: Severe.    UPPER ABDOMEN: Unremarkable.    MUSCULOSKELETAL: Degenerative changes in the visualized thoracolumbar spine. Right shoulder arthroplasty.      Impression    IMPRESSION:  1.  No evidence for pulmonary embolism.  2.  Severe coronary artery calcification.         *Note: Due to a large number of results and/or encounters for the requested time period, some results have not been displayed. A complete set of results can be found in Results Review.       Medications   0.9% sodium chloride BOLUS (0 mLs Intravenous Stopped 9/3/23 1333)   iopamidol (ISOVUE-370) solution 500 mL (60 mLs  Intravenous $Given 9/3/23 1231)   sodium chloride 0.9 % bag 100ml for CT scan flush use (70 mLs As instructed $Given 9/3/23 5354)       Assessments & Plan (with Medical Decision Making)  Lissa is a 72-year-old female presenting to the emergency room for ongoing fatigue, cough, and shortness of breath after recent diagnosis of COVID-19.  See history focused physical exam as above  Pleasant 72-year-old female in no acute respiratory distress, is vitally stable and afebrile.  She is breathing easily and maintaining saturation 97% and above on room air.  Lung sounds are clear bilaterally.  Otherwise physical exam is unremarkable.  A chest x-ray and blood work including a D-dimer.  Suspect that she may have residual symptoms from COVID-19  Labs and imaging as above.  She does have a slight hypomagnesemia with a level of 1.5, but labs are otherwise largely within normal limits.  She did have an elevated D-dimer, and due to recent COVID-19 diagnosis, sent for CT PE study.  Prior chest x-ray that was obtained was normal.  CT did not reveal any acute pulmonary emboli or other acute findings.  She does feel somewhat improved after being given a liter of IV fluids.  Had a discussion with the patient and her  regarding these findings.  Recommend supportive care at home and following up with her primary provider in clinic.  She asked if steroid would be necessary as she had already reached out to her oncologist to see if she could have another course of steroids.  She had been given a taper of prednisone for neuropathy earlier, and said that she felt significantly improved when on it, and her provider said that she may need to have another round of it.  I do not think it would be unreasonable to get her started on the steroid taper, and ordered for same dosing that her oncologist had ordered in July.  I did recommend she follow-up closely with her oncologist to discuss if she should continue to taper as ordered, or if she  needs to make any adjustments.  Return to the emergency room if any worsening symptoms develop.  Discharged in no distress     I have reviewed the nursing notes.    I have reviewed the findings, diagnosis, plan and need for follow up with the patient.           Medical Decision Making  The patient's presentation was of moderate complexity (an undiagnosed new problem with uncertain diagnosis).    The patient's evaluation involved:  review of external note(s) from 1 sources (oncology)  ordering and/or review of 3+ test(s) in this encounter (see separate area of note for details)    The patient's management necessitated moderate risk (prescription drug management including medications given in the ED).        Discharge Medication List as of 9/3/2023  2:02 PM          Final diagnoses:   Malaise and fatigue       9/3/2023   [facility] EMERGENCY DEPT       Dyan Vera DO  09/03/23 2009

## 2023-09-03 NOTE — ED TRIAGE NOTES
"Pt was positive for covid 2 weeks ago. C/O ongoing fatigue, cough and shortness of breath.   Denies fevers or CP.   /85   Pulse 93   Temp 98.1  F (36.7  C) (Oral)   Resp 18   Ht 1.676 m (5' 6\")   Wt 60.8 kg (134 lb)   SpO2 97%   BMI 21.63 kg/m        Triage Assessment       Row Name 09/03/23 1058       Triage Assessment (Adult)    Airway WDL WDL       Respiratory WDL    Respiratory WDL X;cough;rhythm/pattern    Rhythm/Pattern, Respiratory shortness of breath    Cough Frequency frequent       Cardiac WDL    Cardiac WDL WDL                    "

## 2023-09-03 NOTE — DISCHARGE INSTRUCTIONS
Your lab work showed a slightly low magnesium.  You may take over-the-counter magnesium supplements as needed or talk to your oncologist or other primary provider about a supplement if necessary    Your CT scan did not show any sign of blood clot in your lungs, did not show any sign of pneumonia or other worrisome finding    Suspect that your fatigue and ongoing symptoms are related to recent COVID-19 infection.  Hopefully they will gradually improve over time    A refill for the tapering dose of prednisone that your oncologist had previously prescribed was sent to the pharmacy today.  You may start this, but I encourage you to contact Dr. Sauer on Tuesday, 9/5/2023 to discuss this repeat taper of prednisone    If you develop any new or worsening symptoms do not hesitate to return to the emergency room for evaluation

## 2023-09-12 DIAGNOSIS — Z51.11 ENCOUNTER FOR ANTINEOPLASTIC CHEMOTHERAPY: ICD-10-CM

## 2023-09-12 DIAGNOSIS — C67.9 UROTHELIAL CARCINOMA OF BLADDER WITH INVASION OF MUSCLE (H): ICD-10-CM

## 2023-09-12 DIAGNOSIS — C50.412 MALIGNANT NEOPLASM OF UPPER-OUTER QUADRANT OF LEFT FEMALE BREAST, UNSPECIFIED ESTROGEN RECEPTOR STATUS (H): ICD-10-CM

## 2023-09-12 RX ORDER — PROCHLORPERAZINE MALEATE 10 MG
TABLET ORAL
Qty: 30 TABLET | Refills: 2 | Status: SHIPPED | OUTPATIENT
Start: 2023-09-12 | End: 2024-01-28

## 2023-09-16 DIAGNOSIS — G89.4 CHRONIC PAIN SYNDROME: ICD-10-CM

## 2023-09-18 RX ORDER — OXYCODONE AND ACETAMINOPHEN 5; 325 MG/1; MG/1
TABLET ORAL
Qty: 40 TABLET | Refills: 0 | Status: SHIPPED | OUTPATIENT
Start: 2023-09-18 | End: 2023-10-31

## 2023-09-18 NOTE — TELEPHONE ENCOUNTER
Sending refill request to Dr. Sauer to review/sign    oxyCODONE-acetaminophen (PERCOCET) 5-325 MG tablet 40 tablet 0 7/24/2023  No   Sig: TAKE 1 TABLET BY MOUTH EVERY 6 HOURS AS NEEDED FORSEVERE PAIN (7-10)     Last Office Visit: 08/23/23  Next Appointment Scheduled for: 12/4/23  Last refill: 07/24/23    Noemi Garcia RNCC

## 2023-09-19 DIAGNOSIS — E03.4 HYPOTHYROIDISM DUE TO ACQUIRED ATROPHY OF THYROID: ICD-10-CM

## 2023-09-19 DIAGNOSIS — I10 HYPERTENSION GOAL BP (BLOOD PRESSURE) < 140/90: ICD-10-CM

## 2023-09-19 RX ORDER — CARVEDILOL 6.25 MG/1
6.25 TABLET ORAL 2 TIMES DAILY WITH MEALS
Qty: 90 TABLET | Refills: 0 | Status: ON HOLD | OUTPATIENT
Start: 2023-09-19 | End: 2024-06-15

## 2023-09-19 RX ORDER — LEVOTHYROXINE SODIUM 25 UG/1
TABLET ORAL
Qty: 90 TABLET | Refills: 0 | Status: SHIPPED | OUTPATIENT
Start: 2023-09-19 | End: 2023-11-13

## 2023-09-19 NOTE — TELEPHONE ENCOUNTER
Refill for sent to patient's pharmacy. Patient's last visit with Dr Smith was 8/2/22. Per dr Smith's visit note, patient to follow up with nephrology only as needed. A My Chart message sent to patient requesting she follow up for future refills with her PCP.  COLE Lagunas Care Coordinator  Nephrology

## 2023-09-27 ENCOUNTER — MYC MEDICAL ADVICE (OUTPATIENT)
Dept: ONCOLOGY | Facility: CLINIC | Age: 73
End: 2023-09-27
Payer: COMMERCIAL

## 2023-10-10 ENCOUNTER — VIRTUAL VISIT (OUTPATIENT)
Dept: ONCOLOGY | Facility: CLINIC | Age: 73
End: 2023-10-10
Attending: NURSE PRACTITIONER
Payer: COMMERCIAL

## 2023-10-10 VITALS — WEIGHT: 135 LBS | BODY MASS INDEX: 21.69 KG/M2 | HEIGHT: 66 IN

## 2023-10-10 DIAGNOSIS — G62.9 NEUROPATHY: Primary | ICD-10-CM

## 2023-10-10 PROCEDURE — 99213 OFFICE O/P EST LOW 20 MIN: CPT | Mod: 95 | Performed by: NURSE PRACTITIONER

## 2023-10-10 ASSESSMENT — PAIN SCALES - GENERAL: PAINLEVEL: MODERATE PAIN (5)

## 2023-10-10 NOTE — LETTER
10/10/2023         RE: Michaela Dorman  [address]  [facility] [address]        Dear Colleague,    Thank you for referring your patient, Michaela Dorman, to the [facility]. Please see a copy of my visit note below.    Virtual Visit Details    Type of service:  Video Visit   Video Start Time: 12:30 PM  Video End Time:12:57 PM    Originating Location (pt. Location): Home    Distant Location (provider location):  On-site  Platform used for Video Visit: [facility]          Oncology/Hematology Visit Note  Oct 10, 2023    Reason for Visit: follow up of neuropathy    Patient follows with Dr. Sauer     Left-sided ER-, HER2+ breast CA:  # Sep 2016 Presented w/ left axillary mass; staging multicentric T4 lesion w/ geraldine involvement; biopsy w/ ER-, HER2+ breast cancer  # Sep 2016 - Jan 2017 Neoadjuvant AC x 4 cycles/TH x 4 cycles.  # Feb 2017 Bilateral mastectomy no with geraldine evaluation; complete path CR from invasive cancer; 7 mm DCIS residual.  # Apr 2017 - May 2017 Adjuvant radiation to left chestwall / axilla.  # Apr 2017 - Dec 2017 Adjuvant Herceptin.     Bladder cancer:([id number])  # Oct 2020 Presented w/ dysuria.  # Feb 2021 Cytoscopy w/ muscle-invasive high grade urothelial cancer.  # Mar 2021 TURBT.  # Apr 2021 Neoadjuvant cisplatin/gem split dose complicated by TAMIR.  # May 2021 Neoadjuvant carboplatin/gem.  # Jun 2021 Radical cystectomy w/ ileal conduit; path high grade urothelial carcinoma muscle invasive; margins negs; nodes negative.  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         Interval History:  Patient reports she continues to have neuropathy that started after the treatment for her bladder cancer.  Reports in the past she was on prednisone which helped with the neuropathy.  Patient is currently not taking any prednisone.  She is taking  gabapentin 300 mg 3 times daily  Patient denies swelling of the extremities.  Denies erythema.  Denies edema.  Patient denies fever chills sweats cough shortness of breath chest pain        Review of Systems:  14 point ROS of systems including Constitutional, Eyes, Respiratory, Cardiovascular, Gastroenterology, Genitourinary, Integumentary, Muscularskeletal, Psychiatric were all negative except for pertinent positives noted in my HPI.      Physical Examination:  Video visit no audible wheezing or cough patient answers all questions appropriately.      Assessment and Plan:      Peripheral neuropathy  Immunotherapy related neuropathy  Good response to prednisone in the past  She is taking gabapentin 300 mg 3 times daily  -I discussed referral to PMR (physical medicine and rehabilitation) appointment with Dr. Pena -E consult  I  discussed low-dose prednisone.  Patient is worried about being on prednisone for prolonged period of time  Patient would like to have appointment with PMR first before deciding on prednisone  Patient is seeing neurology as well      Left-sided ER-, HER2+ breast CA:  # Sep 2016 Presented w/ left axillary mass; staging multicentric T4 lesion w/ geraldine involvement; biopsy w/ ER-, HER2+ breast cancer  # Sep 2016 - Jan 2017 Neoadjuvant AC x 4 cycles/TH x 4 cycles.  # Feb 2017 Bilateral mastectomy no with geraldine evaluation; complete path CR from invasive cancer; 7 mm DCIS residual.  # Apr 2017 - May 2017 Adjuvant radiation to left chestwall / axilla.  # Apr 2017 - Dec 2017 Adjuvant Herceptin.     Bladder cancer:([id number])  # Oct 2020 Presented w/ dysuria.  # Feb 2021 Cytoscopy w/ muscle-invasive high grade urothelial cancer.  # Mar 2021 TURBT.  # Apr 2021 Neoadjuvant cisplatin/gem split dose complicated by TAMIR.  # May 2021 Neoadjuvant carboplatin/gem.  # Jun 2021 Radical cystectomy w/ ileal conduit; path high grade urothelial carcinoma muscle invasive; margins negs; nodes negative.  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  11/30-CT scan  [id number] appointment with Dr. Sauer       Please call clinic with any changes in health condition or questions    LISA Foley CNP  [facility] [place]     Chart documentation with Dragon Voice recognition Software. Although reviewed after completion, some words and grammatical errors may remain.          Again, thank you for allowing me to participate in the care of your patient.        Sincerely,        LISA Foley CNP

## 2023-10-10 NOTE — NURSING NOTE
Is the patient currently in the state of [place]? YES    Visit mode:VIDEO    If the visit is dropped, the patient can be reconnected by: VIDEO VISIT: Text to cell phone:   Telephone Information:   Mobile [phone]       Will anyone else be joining the visit? NO  (If patient encounters technical issues they should call [phone]    How would you like to obtain your AVS? MyChart    Are changes needed to the allergy or medication list? Pt stated no changes to allergies and Pt stated no med changes    Reason for visit: RECHECK    Roxi OATES

## 2023-10-10 NOTE — LETTER
10/10/2023         RE: Michaela Dorman  [address]  [facility] [address]        Dear Colleague,    Thank you for referring your patient, Michaela Dorman, to the [facility]. Please see a copy of my visit note below.    Virtual Visit Details    Type of service:  Video Visit   Video Start Time: 12:30 PM  Video End Time:12:57 PM    Originating Location (pt. Location): Home    Distant Location (provider location):  On-site  Platform used for Video Visit: [facility]          Oncology/Hematology Visit Note  Oct 10, 2023    Reason for Visit: follow up of neuropathy    Patient follows with Dr. Sauer     Left-sided ER-, HER2+ breast CA:  # Sep 2016 Presented w/ left axillary mass; staging multicentric T4 lesion w/ geraldine involvement; biopsy w/ ER-, HER2+ breast cancer  # Sep 2016 - Jan 2017 Neoadjuvant AC x 4 cycles/TH x 4 cycles.  # Feb 2017 Bilateral mastectomy no with geraldine evaluation; complete path CR from invasive cancer; 7 mm DCIS residual.  # Apr 2017 - May 2017 Adjuvant radiation to left chestwall / axilla.  # Apr 2017 - Dec 2017 Adjuvant Herceptin.     Bladder cancer:([id number])  # Oct 2020 Presented w/ dysuria.  # Feb 2021 Cytoscopy w/ muscle-invasive high grade urothelial cancer.  # Mar 2021 TURBT.  # Apr 2021 Neoadjuvant cisplatin/gem split dose complicated by TAMIR.  # May 2021 Neoadjuvant carboplatin/gem.  # Jun 2021 Radical cystectomy w/ ileal conduit; path high grade urothelial carcinoma muscle invasive; margins negs; nodes negative.  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         Interval History:  Patient reports she continues to have neuropathy that started after the treatment for her bladder cancer.  Reports in the past she was on prednisone which helped with the neuropathy.  Patient is currently not taking any prednisone.  She is taking  gabapentin 300 mg 3 times daily  Patient denies swelling of the extremities.  Denies erythema.  Denies edema.  Patient denies fever chills sweats cough shortness of breath chest pain        Review of Systems:  14 point ROS of systems including Constitutional, Eyes, Respiratory, Cardiovascular, Gastroenterology, Genitourinary, Integumentary, Muscularskeletal, Psychiatric were all negative except for pertinent positives noted in my HPI.      Physical Examination:  Video visit no audible wheezing or cough patient answers all questions appropriately.      Assessment and Plan:      Peripheral neuropathy  Immunotherapy related neuropathy  Good response to prednisone in the past  She is taking gabapentin 300 mg 3 times daily  -I discussed referral to PMR (physical medicine and rehabilitation) appointment with Dr. Pena -E consult  I  discussed low-dose prednisone.  Patient is worried about being on prednisone for prolonged period of time  Patient would like to have appointment with PMR first before deciding on prednisone  Patient is seeing neurology as well      Left-sided ER-, HER2+ breast CA:  # Sep 2016 Presented w/ left axillary mass; staging multicentric T4 lesion w/ geraldine involvement; biopsy w/ ER-, HER2+ breast cancer  # Sep 2016 - Jan 2017 Neoadjuvant AC x 4 cycles/TH x 4 cycles.  # Feb 2017 Bilateral mastectomy no with geraldine evaluation; complete path CR from invasive cancer; 7 mm DCIS residual.  # Apr 2017 - May 2017 Adjuvant radiation to left chestwall / axilla.  # Apr 2017 - Dec 2017 Adjuvant Herceptin.     Bladder cancer:([id number])  # Oct 2020 Presented w/ dysuria.  # Feb 2021 Cytoscopy w/ muscle-invasive high grade urothelial cancer.  # Mar 2021 TURBT.  # Apr 2021 Neoadjuvant cisplatin/gem split dose complicated by TAMIR.  # May 2021 Neoadjuvant carboplatin/gem.  # Jun 2021 Radical cystectomy w/ ileal conduit; path high grade urothelial carcinoma muscle invasive; margins negs; nodes negative.  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  11/30-CT scan  [id number] appointment with Dr. Sauer       Please call clinic with any changes in health condition or questions    LISA Foley CNP  [facility] [place]     Chart documentation with Dragon Voice recognition Software. Although reviewed after completion, some words and grammatical errors may remain.          Again, thank you for allowing me to participate in the care of your patient.        Sincerely,        LISA Foley CNP

## 2023-10-10 NOTE — TELEPHONE ENCOUNTER
Called pt and scheduled her for 10/10 with Stanislav Guzman.    Lashell Nguyễn  [facility]  [phone]

## 2023-10-10 NOTE — PROGRESS NOTES
Virtual Visit Details    Type of service:  Video Visit   Video Start Time: 12:30 PM  Video End Time:12:57 PM    Originating Location (pt. Location): Home    Distant Location (provider location):  On-site  Platform used for Video Visit: [facility]          Oncology/Hematology Visit Note  Oct 10, 2023    Reason for Visit: follow up of neuropathy    Patient follows with Dr. Sauer     Left-sided ER-, HER2+ breast CA:  # Sep 2016 Presented w/ left axillary mass; staging multicentric T4 lesion w/ geraldine involvement; biopsy w/ ER-, HER2+ breast cancer  # Sep 2016 - Jan 2017 Neoadjuvant AC x 4 cycles/TH x 4 cycles.  # Feb 2017 Bilateral mastectomy no with geraldine evaluation; complete path CR from invasive cancer; 7 mm DCIS residual.  # Apr 2017 - May 2017 Adjuvant radiation to left chestwall / axilla.  # Apr 2017 - Dec 2017 Adjuvant Herceptin.     Bladder cancer:([id number])  # Oct 2020 Presented w/ dysuria.  # Feb 2021 Cytoscopy w/ muscle-invasive high grade urothelial cancer.  # Mar 2021 TURBT.  # Apr 2021 Neoadjuvant cisplatin/gem split dose complicated by TAMIR.  # May 2021 Neoadjuvant carboplatin/gem.  # Jun 2021 Radical cystectomy w/ ileal conduit; path high grade urothelial carcinoma muscle invasive; margins negs; nodes negative.  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         Interval History:  Patient reports she continues to have neuropathy that started after the treatment for her bladder cancer.  Reports in the past she was on prednisone which helped with the neuropathy.  Patient is currently not taking any prednisone.  She is taking gabapentin 300 mg 3 times daily  Patient denies swelling of the extremities.  Denies erythema.  Denies edema.  Patient denies fever chills sweats cough shortness of breath chest pain        Review of Systems:  14 point ROS of systems including  Constitutional, Eyes, Respiratory, Cardiovascular, Gastroenterology, Genitourinary, Integumentary, Muscularskeletal, Psychiatric were all negative except for pertinent positives noted in my HPI.      Physical Examination:  Video visit no audible wheezing or cough patient answers all questions appropriately.      Assessment and Plan:      Peripheral neuropathy  Immunotherapy related neuropathy  Good response to prednisone in the past  She is taking gabapentin 300 mg 3 times daily  -I discussed referral to PMR (physical medicine and rehabilitation) appointment with Dr. Pena -E consult  I  discussed low-dose prednisone.  Patient is worried about being on prednisone for prolonged period of time  Patient would like to have appointment with PMR first before deciding on prednisone  Patient is seeing neurology as well      Left-sided ER-, HER2+ breast CA:  # Sep 2016 Presented w/ left axillary mass; staging multicentric T4 lesion w/ geraldine involvement; biopsy w/ ER-, HER2+ breast cancer  # Sep 2016 - Jan 2017 Neoadjuvant AC x 4 cycles/TH x 4 cycles.  # Feb 2017 Bilateral mastectomy no with geraldine evaluation; complete path CR from invasive cancer; 7 mm DCIS residual.  # Apr 2017 - May 2017 Adjuvant radiation to left chestwall / axilla.  # Apr 2017 - Dec 2017 Adjuvant Herceptin.     Bladder cancer:([id number])  # Oct 2020 Presented w/ dysuria.  # Feb 2021 Cytoscopy w/ muscle-invasive high grade urothelial cancer.  # Mar 2021 TURBT.  # Apr 2021 Neoadjuvant cisplatin/gem split dose complicated by TAMIR.  # May 2021 Neoadjuvant carboplatin/gem.  # Jun 2021 Radical cystectomy w/ ileal conduit; path high grade urothelial carcinoma muscle invasive; margins negs; nodes negative.  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  11/30-CT scan  [id number] appointment with Dr. Sauer       Please call clinic  with any changes in health condition or questions    LISA Foley CNP [facility] [place]     Chart documentation with Dragon Voice recognition Software. Although reviewed after completion, some words and grammatical errors may remain.

## 2023-10-11 ENCOUNTER — E-CONSULT (OUTPATIENT)
Dept: PHYSICAL MEDICINE AND REHAB | Facility: CLINIC | Age: 73
End: 2023-10-11
Payer: COMMERCIAL

## 2023-10-11 PROCEDURE — 99207 PR NO BILLABLE SERVICE THIS VISIT: CPT | Performed by: PHYSICAL MEDICINE & REHABILITATION

## 2023-10-11 NOTE — PROGRESS NOTES
10/11/2023     E-Consult has been denied due to: Complexity of question, needs in-person referral.    Interprofessional consultation requested by:  Stanislav Guzman APRN CNP      Clinical Question/Purpose: MY CLINICAL QUESTION IS: Neuropathy after bladder cancer treatment    Patient assessment and information reviewed:   Symptoms seems to be secondary to polyneuropathy (chemo-induced vs other causes)    Recommendations:   Dear Stanislav,    Please place an oncology referral with cancer rehab option so Dr. Pena can see this patient in clinic for more evaluation and management.    Thanks,     Emma Jhaveri MD  Physical Medicine & Rehabilitation        The recommendations provided in this E-Consult are based on a review of clinical data pertinent to the clinical question presented, without a review of the patient's complete medical record or, the benefit of a comprehensive in-person or virtual patient evaluation. This consultation should not replace the clinical judgement and evaluation of the provider ordering this E-Consult. Any new clinical issues, or changes in patient status since the filing of this E-Consult will need to be taken into account when assessing these recommendations. Please contact me if you have further questions.    My total time spent reviewing clinical information and formulating assessment was 5 minutes.        Emma Jhaveri MD

## 2023-10-12 ENCOUNTER — PATIENT OUTREACH (OUTPATIENT)
Dept: ONCOLOGY | Facility: CLINIC | Age: 73
End: 2023-10-12

## 2023-10-12 ENCOUNTER — OFFICE VISIT (OUTPATIENT)
Dept: NEUROLOGY | Facility: CLINIC | Age: 73
End: 2023-10-12
Payer: COMMERCIAL

## 2023-10-12 VITALS
WEIGHT: 135 LBS | SYSTOLIC BLOOD PRESSURE: 118 MMHG | DIASTOLIC BLOOD PRESSURE: 68 MMHG | HEART RATE: 78 BPM | BODY MASS INDEX: 21.79 KG/M2

## 2023-10-12 DIAGNOSIS — G62.9 NEUROPATHY: Primary | ICD-10-CM

## 2023-10-12 PROCEDURE — 99215 OFFICE O/P EST HI 40 MIN: CPT | Performed by: INTERNAL MEDICINE

## 2023-10-12 RX ORDER — GABAPENTIN 300 MG/1
600 CAPSULE ORAL 3 TIMES DAILY
Qty: 180 CAPSULE | Refills: 5 | Status: SHIPPED | OUTPATIENT
Start: 2023-10-12

## 2023-10-12 NOTE — PROGRESS NOTES
[facility] Neurology Follow Up Visit    Michaela Dorman MRN# [id number]   Age: 73 year old YOB: 1950     Brief history of symptoms: The patient was initially seen in neurologic consultation on 7/26/2023 for evaluation of neuropathy. Please see the comprehensive neurologic consultation note from that date in the Epic records for details.     Interval history:   She had COVID mid August and had worsening symptoms of generalized weakness and worsening numbness/tingling. She went to the ER on 9/3/2023 for symptoms. They restarted prednisone for 21 days. This helped with weakness and fatigue and tingling. Symptoms worsened after finishing prednisone.     She is currently bothered by numbness/tingling in the arms. If she starts walking she feels the symptoms in the legs as well. She also has joint pains and generalized fatigue.    She takes gabapentin 300 mg TID. This helps with pain.       Past Medical History:     Patient Active Problem List   Diagnosis    Enthesopathy of hip region    Family history of stroke (cerebrovascular)    Trochanteric bursitis    Advanced directives, counseling/discussion    Health Care Home    Hypertension goal BP (blood pressure) < 140/90    Baker's cyst of knee    Patella-femoral syndrome    Adjustment disorder with depressed mood    Malignant neoplasm of upper-outer quadrant of left breast in female, estrogen receptor negative (H)    Encounter for antineoplastic chemotherapy    S/P bilateral mastectomy    Anxiety    Hyperlipidemia LDL goal <130    S/P reverse total shoulder arthroplasty, right    Prophylactic antibiotic for dental procedure indicated due to prior joint replacement    Chronic pain syndrome    Lumbar radiculopathy    Foraminal stenosis of lumbar region    Central stenosis of spinal canal    DDD (degenerative disc disease), lumbar    Stage 3a chronic kidney disease (H)    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)    Magallanes's neuroma  of right foot    Bilateral impacted cerumen    S/P lumbar fusion    Anemia    Carotid stenosis, bilateral L80%, R40%    Carotid artery plaque, bilateral    POSSIBLE Right internal carotid artery aneurysm    Symptomatic stenosis of left carotid artery    Personal history of malignant neoplasm of breast    Acute cystitis with hematuria    Benign paroxysmal positional vertigo, bilateral    Personal history of malignant neoplasm of bladder    Malignant neoplasm of overlapping sites of bladder (H)    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)    Chemotherapy-induced neutropenia     Thrombocytopenia (H24)    Anemia in neoplastic disease    Constipation, unspecified constipation type    Imaging abnormalities    Hypomagnesemia    Need for prophylactic vaccination and inoculation against influenza    Rheumatoid arthritis of multiple sites with negative rheumatoid factor (H)    Parathyroid abnormality (H24)    Arthritis of shoulder region, right    Osteoarthrosis, hip    Tinnitus of right ear    Status of artificial opening of urinary tract (H)    Status post ileal conduit (H)    Chronic fatigue syndrome    Vision changes    Balance problems    Skin sensation disturbance    Ileostomy status (H)    Acquired hypothyroidism    Hypothyroidism due to medication    Pulmonary nodules    F11.2 - Continuous opioid dependence (H)     Past Medical History:   Diagnosis Date    Acute kidney injury (H24)     Carotid stenosis, bilateral L80%, R40% 09/02/2020    Central stenosis of spinal canal 12/01/2017    lumbar     DDD (degenerative disc disease), lumbar 12/01/2017    Depressive disorder, not elsewhere classified     Esophageal reflux     ETOH abuse     in remission    Foraminal stenosis of lumbar region 12/01/2017    Complete lumbar spine left at various degrees and to a lesser extent the right as well.    Lumbar radiculopathy 11/30/2017    Personal history of malignant neoplasm of breast     Prophylactic antibiotic for  dental procedure indicated due to prior joint replacement 06/05/2017    S/P reverse total shoulder arthroplasty, right 06/05/2017    Subdural hematoma (H)     Thyroid disease     Urothelial carcinoma (H)         Past Surgical History:     Past Surgical History:   Procedure Laterality Date    ARTHROPLASTY SHOULDER Right 11/17/2015    ARTHROSCOPY KNEE  6/7/2012    Procedure:ARTHROSCOPY KNEE; right knee arthroscopy with partial lateral menisectomy; Surgeon:DAMIÁN MCALLISTER; Location:[facility]    BIOPSY VAGINAL N/A 10/27/2021    Procedure: DISTAL URETHRECTOMY;  Surgeon: Leonardo Robles MD;  Location: [facility] OR    COLONOSCOPY N/A 12/22/2017    Procedure: COLONOSCOPY;  colonoscopy;  Surgeon: Chuck Chand MD;  Location: [facility]    CYSTECTOMY BLADDER RADICAL, ILEAL DIVERSION, COMBINED N/A 6/1/2021    Procedure: RADICAL CYSTECTOMY  WITH ILEAL CONDUIT CREATION,BILATERAL PELVIC LYMPHADENECTOMY;  Surgeon: Leonardo Robles MD;  Location: [facility] OR    CYSTOSCOPY, RETROGRADES, COMBINED Bilateral 3/18/2021    Procedure: CYSTOSCOPY, WITH RETROGRADE PYELOGRAM;  Surgeon: Girish Jack MD;  Location: [facility]    CYSTOSCOPY, TRANSURETHRAL RESECTION (TUR) TUMOR BLADDER, COMBINED N/A 3/18/2021    Procedure: CYSTOSCOPY, WITH TRANSURETHRAL RESECTION BLADDER TUMOR;  Surgeon: Girish Jack MD;  Location: [facility]    ENDARTERECTOMY CAROTID Left 10/8/2020    Procedure: LEFT CAROTID ENDARTERECTOMY WITH EEG;  Surgeon: Lacho Jasmine MD;  Location: [facility]    ESOPHAGOSCOPY, GASTROSCOPY, DUODENOSCOPY (EGD), COMBINED N/A 6/20/2022    Procedure: ESOPHAGOGASTRODUODENOSCOPY, WITH BIOPSY;  Surgeon: Ramses Arenas MD;  Location: [facility]    EXCISE MASS AXILLA Left 9/16/2016    Procedure: EXCISE MASS AXILLA;  Surgeon: Keyon Blanco MD;  Location: [facility]    HC REMV CATARACT EXTRACAP,INSERT LENS, W/O ECP  6/25/2009    Right eye    INJECT EPIDURAL LUMBAR N/A 4/11/2019    Procedure: interlaminar epidual steroid injection lumbar 4-5;   Surgeon: Oskar Salvador MD;  Location: [facility]    INJECT EPIDURAL LUMBAR Bilateral 2019    Procedure: lumbar 4-5 epidural interlaminar steroid injection;  Surgeon: Oskar Salvador MD;  Location: [facility]    INSERT PORT VASCULAR ACCESS Right 10/7/2016    Procedure: INSERT PORT VASCULAR ACCESS;  Surgeon: Keyon Blanco MD;  Location: [facility]    IR CHEST PORT PLACEMENT > 5 YRS OF AGE  2021    MASTECTOMY SIMPLE BILATERAL Bilateral 2017    Procedure: MASTECTOMY SIMPLE BILATERAL;  Surgeon: Keyon Blanco MD;  [facility]: [facility]    OPEN REDUCTION INTERNAL FIXATION FOOT Right 3/20/2015    Procedure: OPEN REDUCTION INTERNAL FIXATION FOOT;  Surgeon: Javi Herrmann DPM;  Location: [facility]    OPTICAL TRACKING SYSTEM FUSION SPINE POSTERIOR LUMBAR TWO LEVELS N/A 2020    Procedure: LUMBAR 2-SACRAL1 TRANSFORMINAL INTERBODY FUSION;  Surgeon: Lenny Gomes MD;  Location: [facility]    REMOVE PORT VASCULAR ACCESS Right 2018    Procedure: REMOVE PORT VASCULAR ACCESS;  right intra jugular port removal;  Surgeon: Keyon Blanco MD;  Location: [facility]    SHOULDER SURGERY Right 2015    ZZC LIGATE FALLOPIAN TUBE      ZZC NONSPECIFIC PROCEDURE      ankle fracture surgery        Social History:     Social History     Tobacco Use    Smoking status: Former     Packs/day: 3.00     Years: 10.00     Additional pack years: 0.00     Total pack years: 30.00     Types: Cigarettes     Quit date: 1979     Years since quittin.8    Smokeless tobacco: Never    Tobacco comments:     approx. 3 packs daily   Vaping Use    Vaping Use: Never used   Substance Use Topics    Alcohol use: Not Currently     Comment: Stopped drinking a few weeks ago (2023)    Drug use: No        Family History:     Family History   Problem Relation Age of Onset    Hypertension Mother     Thyroid Disease Mother     Cerebrovascular Disease Mother     Hypertension Father     Cerebrovascular Disease Father     Cancer Father         skin     "Thyroid Disease Sister     Diabetes Brother         type 2    Depression Sister     Breast Cancer Sister         age 47    Cancer Sister         skin    Cancer Brother         inside nose        Medications:     Current Outpatient Medications   Medication Sig    acetaminophen (TYLENOL) 500 MG tablet Take 1,000 mg by mouth 3 times daily as needed for mild pain (500MG X 2 = 1,000MG)    ALPRAZolam (XANAX) 0.5 MG tablet TAKE 1 TABLET BY MOUTH DAILY AT BEDTIME AS NEEDED FOR SLEEP    aspirin (ASA) 81 MG chewable tablet Take 1 tablet (81 mg) by mouth daily    carvedilol (COREG) 6.25 MG tablet TAKE 1 TABLET (6.25 MG) BY MOUTH 2 TIMES DAILY (WITH MEALS) PLEASE SEND NEXT REFILL TO PRIMARY CARE AS NEPHROLOGY FOLLOW UP \"AS NEEDED\"    Cyanocobalamin (B-12 PO) Take by mouth daily     escitalopram (LEXAPRO) 20 MG tablet Take 1 tablet (20 mg) by mouth daily    gabapentin (NEURONTIN) 300 MG capsule TAKE 1 CAPSULE (300 MG) BY MOUTH 3 TIMES DAILY    levothyroxine (SYNTHROID/LEVOTHROID) 25 MCG tablet TAKE 1 TABLET BY MOUTH EVERY DAY    LORazepam (ATIVAN) 0.5 MG tablet TAKE 1 TABLET (0.5 MG) BY MOUTH EVERY 4 HOURS AS NEEDED FOR ANXIETY, NAUSEA OR SLEEP    meclizine 25 MG CHEW Take 25 mg by mouth every 6 hours as needed for dizziness     methylPREDNISolone (MEDROL DOSEPAK) 4 MG tablet therapy pack Follow Package Directions    omeprazole (PRILOSEC) 20 MG DR capsule Take 20 mg by mouth daily    oxyCODONE-acetaminophen (PERCOCET) 5-325 MG tablet TAKE 1 TABLET BY MOUTH EVERY 6 HOURS AS NEEDED FORSEVERE PAIN (7-10)    oxyCODONE-acetaminophen (PERCOCET) 5-325 MG tablet TAKE 1/2-1 TABLETS BY MOUTHEVERY 6 HOURS AS NEEDED FORSEVERE PAIN    predniSONE (DELTASONE) 5 MG tablet Take 6 tablets (30 mg) by mouth daily for 7 days, THEN 4 tablets (20 mg) daily for 7 days, THEN 2 tablets (10 mg) daily for 7 days, THEN 1 tablet (5 mg) daily for 7 days.    prochlorperazine (COMPAZINE) 10 MG tablet TAKE 1/2 TABLET BY MOUTH EVERY 6 HOURS AS NEEDED FOR " NAUSEA/VOMITING    STOOL SOFTENER/LAXATIVE 50-8.6 MG tablet TAKE 2 TABLETS BY MOUTH DAILY AT BEDTIME     No current facility-administered medications for this visit.        Allergies:     Allergies   Allergen Reactions    Oxybutynin      Patient reports symptoms of nausea and mind fogginess        Review of Systems:   As above     Physical Exam:   Vitals: /68 (BP Location: Right arm, Patient Position: Sitting, Cuff Size: Adult Regular)   Pulse 78   Wt 61.2 kg (135 lb)   BMI 21.79 kg/m     General: Seated comfortably in no acute distress.  Lungs: breathing comfortably  Neurologic:     Mental Status: Fully alert, attentive. Normal memory and fund of knowledge. Language normal, speech clear and fluent, no paraphasic errors.      Cranial Nerves: No dysarthria.      Motor: No tremors or other abnormal movements observed. Muscle tone normal throughout. Strength as below      Right Left   Shoulder abduction        5 5   Elbow extension 5 5   Elbow flexion 5 5   Wrist extension         5 5   Finger extension 5 5   ADM 5 5   FDI 5 5   APB 4+ 4+   Hip flexion 5 5   Knee flexion 5 5   Knee extension 5 5   Dorsiflexion 5 5   Plantar flexion 5 5        Deep Tendon     Right Left   Biceps 1 2   BRD 1 1   Triceps 1 1   Patellar 2 1   Achilles 1 1   Plantar equivocal equivocal   Clonus Absent Absent        Sensory: Decreased sensation to light touch in bilateral finger tips and below the ankles bilaterally. Decreased sensation of temperature in the feet compared to hands. Vibration is ~1-2 seconds in bilateral great toes, ~5 seconds in the right ankle and ~3 seconds in the left ankle. Proprioception is intact throughout. Negative Romberg.      Coordination: Finger-nose-finger and heel-shin intact without dysmetria.      Gait: Steady casual gait but cautious. Able to walk on toes, heels with mild difficulty. Moderate difficulties with tandem gait.      Data reviewed on previous visits    Imaging:  MRI brain  7/2023  IMPRESSION:  1. No acute intracranial pathology.  2. Mild leukoaraiosis and parenchymal volume loss, most likely sequela  of chronic microvascular ischemic disease. MRI findings atypical for  demyelinating disease.     MRI cervical 7/2023  IMPRESSION: No substantial change in multilevel spondylosis most  pronounced at C5-C6 since comparison MRI. No high-grade spinal canal  stenosis or myelopathic cord signal.       MRI lumbar 7/2023  IMPRESSION:  1. Postoperative L2-S1 fusion and posterior element decompression  changes. Spinal canal is widely patent at all levels. Persistent  dorsal epidural space fluid collection most likely represents a stable  seroma dating back to at least 2021.  2. Multilevel lumbar spondylosis as detailed in body of the report.     Procedures:  EMG 2018    Laboratory:  CMP with TAMIR, TSH normal, CBC with mild anemia, CRP normal, Vit D normal (7/2023)      Pertinent Investigations since last visit:   EMG 8/10/2023  Interpretation:  This is an abnormal examination. 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 In spring time patient developed worsening numbness/tingling in the extremities, imbalance, and sense of weakness. She was given prednisone/gabapentin in July by Oncologist, which resulted in significant improvement. EMG showed mild length dependent sensorimotor axonal polyneuropathy. She also had MRI brain/cervical/lumbar imaging, which was unrevealing. I suspect polyneuropathy is contributing to patient's symptoms, but there are likely other contributors such as orthopedic issues and non specific fatigue.  Polyneuropathy is likely related to prior chemotherapy in part, but given timing of symptoms is unlikely to explain the full picture. At this junction I don't see an indication for immunotherapy from a neurological perspective. We discussed increasing gabapentin as below and can make further adjustments as needed.       Plan:  - Increase gabapentin to 600 mg TID  - MyChart message up in ~2 weeks    Follow up in Neurology clinic pending above    Dennis Reed MD   of Neurology  [facility]    The total time of this encounter today amounted to 40 minutes. This time included time spent with the patient, prep work, ordering tests, and performing post visit documentation.

## 2023-10-12 NOTE — PROGRESS NOTES
received referral for Dr Pena in the PM&R clinic.     Referred for: neuropathy weakness     Scheduling instructions updated and sent to New Patient Scheduling for completion.

## 2023-10-12 NOTE — LETTER
10/12/2023         RE: Michaela Dorman  [address]  [facility] [address]        Dear Colleague,    Thank you for referring your patient, Michaela Dorman, to the [facility] [place]. Please see a copy of my visit note below.    [facility] Neurology Follow Up Visit    Michaela Dorman MRN# [id number]   Age: 73 year old YOB: 1950     Brief history of symptoms: The patient was initially seen in neurologic consultation on 7/26/2023 for evaluation of neuropathy. Please see the comprehensive neurologic consultation note from that date in the Epic records for details.     Interval history:   She had COVID mid August and had worsening symptoms of generalized weakness and worsening numbness/tingling. She went to the ER on 9/3/2023 for symptoms. They restarted prednisone for 21 days. This helped with weakness and fatigue and tingling. Symptoms worsened after finishing prednisone.     She is currently bothered by numbness/tingling in the arms. If she starts walking she feels the symptoms in the legs as well. She also has joint pains and generalized fatigue.    She takes gabapentin 300 mg TID. This helps with pain.       Past Medical History:     Patient Active Problem List   Diagnosis     Enthesopathy of hip region     Family history of stroke (cerebrovascular)     Trochanteric bursitis     Advanced directives, counseling/discussion     Health Care Home     Hypertension goal BP (blood pressure) < 140/90     Baker's cyst of knee     Patella-femoral syndrome     Adjustment disorder with depressed mood     Malignant neoplasm of upper-outer quadrant of left breast in female, estrogen receptor negative (H)     Encounter for antineoplastic chemotherapy     S/P bilateral mastectomy     Anxiety     Hyperlipidemia LDL goal <130     S/P reverse total shoulder arthroplasty, right     Prophylactic antibiotic for dental procedure indicated due to prior joint replacement     Chronic pain  syndrome     Lumbar radiculopathy     Foraminal stenosis of lumbar region     Central stenosis of spinal canal     DDD (degenerative disc disease), lumbar     Stage 3a chronic kidney disease (H)     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)     Magallanes's neuroma of right foot     Bilateral impacted cerumen     S/P lumbar fusion     Anemia     Carotid stenosis, bilateral L80%, R40%     Carotid artery plaque, bilateral     POSSIBLE Right internal carotid artery aneurysm     Symptomatic stenosis of left carotid artery     Personal history of malignant neoplasm of breast     Acute cystitis with hematuria     Benign paroxysmal positional vertigo, bilateral     Personal history of malignant neoplasm of bladder     Malignant neoplasm of overlapping sites of bladder (H)     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)     Chemotherapy-induced neutropenia      Thrombocytopenia (H24)     Anemia in neoplastic disease     Constipation, unspecified constipation type     Imaging abnormalities     Hypomagnesemia     Need for prophylactic vaccination and inoculation against influenza     Rheumatoid arthritis of multiple sites with negative rheumatoid factor (H)     Parathyroid abnormality (H24)     Arthritis of shoulder region, right     Osteoarthrosis, hip     Tinnitus of right ear     Status of artificial opening of urinary tract (H)     Status post ileal conduit (H)     Chronic fatigue syndrome     Vision changes     Balance problems     Skin sensation disturbance     Ileostomy status (H)     Acquired hypothyroidism     Hypothyroidism due to medication     Pulmonary nodules     F11.2 - Continuous opioid dependence (H)     Past Medical History:   Diagnosis Date     Acute kidney injury (H24)      Carotid stenosis, bilateral L80%, R40% 09/02/2020     Central stenosis of spinal canal 12/01/2017    lumbar      DDD (degenerative disc disease), lumbar 12/01/2017     Depressive disorder,  not elsewhere classified      Esophageal reflux      ETOH abuse     in remission     Foraminal stenosis of lumbar region 12/01/2017    Complete lumbar spine left at various degrees and to a lesser extent the right as well.     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     Subdural hematoma (H)      Thyroid disease      Urothelial carcinoma (H)         Past Surgical History:     Past Surgical History:   Procedure Laterality Date     ARTHROPLASTY SHOULDER Right 11/17/2015     ARTHROSCOPY KNEE  6/7/2012    Procedure:ARTHROSCOPY KNEE; right knee arthroscopy with partial lateral menisectomy; Surgeon:DAMIÁN MCALLISTER; Location:[facility]     BIOPSY VAGINAL N/A 10/27/2021    Procedure: DISTAL URETHRECTOMY;  Surgeon: Leonardo Robles MD;  Location: [facility] OR     COLONOSCOPY N/A 12/22/2017    Procedure: COLONOSCOPY;  colonoscopy;  Surgeon: Chuck Chand MD;  Location: [facility]     CYSTECTOMY BLADDER RADICAL, ILEAL DIVERSION, COMBINED N/A 6/1/2021    Procedure: RADICAL CYSTECTOMY  WITH ILEAL CONDUIT CREATION,BILATERAL PELVIC LYMPHADENECTOMY;  Surgeon: Leonardo Robles MD;  Location: [facility] OR     CYSTOSCOPY, RETROGRADES, COMBINED Bilateral 3/18/2021    Procedure: CYSTOSCOPY, WITH RETROGRADE PYELOGRAM;  Surgeon: Girish Jack MD;  Location: [facility]     CYSTOSCOPY, TRANSURETHRAL RESECTION (TUR) TUMOR BLADDER, COMBINED N/A 3/18/2021    Procedure: CYSTOSCOPY, WITH TRANSURETHRAL RESECTION BLADDER TUMOR;  Surgeon: Girish Jack MD;  Location: [facility]     ENDARTERECTOMY CAROTID Left 10/8/2020    Procedure: LEFT CAROTID ENDARTERECTOMY WITH EEG;  Surgeon: Lacho Jasmine MD;  Location: [facility] OR     ESOPHAGOSCOPY, GASTROSCOPY, DUODENOSCOPY (EGD), COMBINED N/A 6/20/2022    Procedure: ESOPHAGOGASTRODUODENOSCOPY, WITH  BIOPSY;  Surgeon: Ramses Arenas MD;  Location: [facility]     EXCISE MASS AXILLA Left 2016    Procedure: EXCISE MASS AXILLA;  Surgeon: Keyon Blanco MD;  Location: [facility]     HC REMV CATARACT EXTRACAP,INSERT LENS, W/O ECP  2009    Right eye     INJECT EPIDURAL LUMBAR N/A 2019    Procedure: interlaminar epidual steroid injection lumbar 4-5;  Surgeon: Oskar Salvador MD;  Location: [facility]     INJECT EPIDURAL LUMBAR Bilateral 2019    Procedure: lumbar 4-5 epidural interlaminar steroid injection;  Surgeon: Oskar Salvador MD;  [facility]: [facility]     INSERT PORT VASCULAR ACCESS Right 10/7/2016    Procedure: INSERT PORT VASCULAR ACCESS;  Surgeon: Keyon Blanco MD;  [facility]     IR CHEST PORT PLACEMENT > 5 YRS OF AGE  2021     MASTECTOMY SIMPLE BILATERAL Bilateral 2017    Procedure: MASTECTOMY SIMPLE BILATERAL;  Surgeon: Keyon Blanco MD;  [facility]     OPEN REDUCTION INTERNAL FIXATION FOOT Right 3/20/2015    Procedure: OPEN REDUCTION INTERNAL FIXATION FOOT;  Surgeon: Javi Herrmann DPM;  [facility]     OPTICAL TRACKING SYSTEM FUSION SPINE POSTERIOR LUMBAR TWO LEVELS N/A 2020    Procedure: LUMBAR 2-SACRAL1 TRANSFORMINAL INTERBODY FUSION;  Surgeon: Lenny Gomes MD;  Location: [facility]     REMOVE PORT VASCULAR ACCESS Right 2018    Procedure: REMOVE PORT VASCULAR ACCESS;  right intra jugular port removal;  Surgeon: Keyon Blanco MD;  [facility]     SHOULDER SURGERY Right 2015     ZZC LIGATE FALLOPIAN TUBE       ZZC NONSPECIFIC PROCEDURE      ankle fracture surgery        Social History:     Social History     Tobacco Use     Smoking status: Former     Packs/day: 3.00     Years: 10.00     Additional pack years: 0.00     Total pack years: 30.00     Types: Cigarettes     Quit date: 1979     Years since quittin.8     Smokeless tobacco: Never     Tobacco comments:     approx. 3 packs daily   Vaping Use     Vaping Use: Never used  "  Substance Use Topics     Alcohol use: Not Currently     Comment: Stopped drinking a few weeks ago (july 2023)     Drug use: No        Family History:     Family History   Problem Relation Age of Onset     Hypertension Mother      Thyroid Disease Mother      Cerebrovascular Disease Mother      Hypertension Father      Cerebrovascular Disease Father      Cancer Father         skin     Thyroid Disease Sister      Diabetes Brother         type 2     Depression Sister      Breast Cancer Sister         age 47     Cancer Sister         skin     Cancer Brother         inside nose        Medications:     Current Outpatient Medications   Medication Sig     acetaminophen (TYLENOL) 500 MG tablet Take 1,000 mg by mouth 3 times daily as needed for mild pain (500MG X 2 = 1,000MG)     ALPRAZolam (XANAX) 0.5 MG tablet TAKE 1 TABLET BY MOUTH DAILY AT BEDTIME AS NEEDED FOR SLEEP     aspirin (ASA) 81 MG chewable tablet Take 1 tablet (81 mg) by mouth daily     carvedilol (COREG) 6.25 MG tablet TAKE 1 TABLET (6.25 MG) BY MOUTH 2 TIMES DAILY (WITH MEALS) PLEASE SEND NEXT REFILL TO PRIMARY CARE AS NEPHROLOGY FOLLOW UP \"AS NEEDED\"     Cyanocobalamin (B-12 PO) Take by mouth daily      escitalopram (LEXAPRO) 20 MG tablet Take 1 tablet (20 mg) by mouth daily     gabapentin (NEURONTIN) 300 MG capsule TAKE 1 CAPSULE (300 MG) BY MOUTH 3 TIMES DAILY     levothyroxine (SYNTHROID/LEVOTHROID) 25 MCG tablet TAKE 1 TABLET BY MOUTH EVERY DAY     LORazepam (ATIVAN) 0.5 MG tablet TAKE 1 TABLET (0.5 MG) BY MOUTH EVERY 4 HOURS AS NEEDED FOR ANXIETY, NAUSEA OR SLEEP     meclizine 25 MG CHEW Take 25 mg by mouth every 6 hours as needed for dizziness      methylPREDNISolone (MEDROL DOSEPAK) 4 MG tablet therapy pack Follow Package Directions     omeprazole (PRILOSEC) 20 MG DR capsule Take 20 mg by mouth daily     oxyCODONE-acetaminophen (PERCOCET) 5-325 MG tablet TAKE 1 TABLET BY MOUTH EVERY 6 HOURS AS NEEDED FORSEVERE PAIN (7-10)     oxyCODONE-acetaminophen " (PERCOCET) 5-325 MG tablet TAKE 1/2-1 TABLETS BY MOUTHEVERY 6 HOURS AS NEEDED FORSEVERE PAIN     predniSONE (DELTASONE) 5 MG tablet Take 6 tablets (30 mg) by mouth daily for 7 days, THEN 4 tablets (20 mg) daily for 7 days, THEN 2 tablets (10 mg) daily for 7 days, THEN 1 tablet (5 mg) daily for 7 days.     prochlorperazine (COMPAZINE) 10 MG tablet TAKE 1/2 TABLET BY MOUTH EVERY 6 HOURS AS NEEDED FOR NAUSEA/VOMITING     STOOL SOFTENER/LAXATIVE 50-8.6 MG tablet TAKE 2 TABLETS BY MOUTH DAILY AT BEDTIME     No current facility-administered medications for this visit.        Allergies:     Allergies   Allergen Reactions     Oxybutynin      Patient reports symptoms of nausea and mind fogginess        Review of Systems:   As above     Physical Exam:   Vitals: /68 (BP Location: Right arm, Patient Position: Sitting, Cuff Size: Adult Regular)   Pulse 78   Wt 61.2 kg (135 lb)   BMI 21.79 kg/m     General: Seated comfortably in no acute distress.  Lungs: breathing comfortably  Neurologic:     Mental Status: Fully alert, attentive. Normal memory and fund of knowledge. Language normal, speech clear and fluent, no paraphasic errors.      Cranial Nerves: No dysarthria.      Motor: No tremors or other abnormal movements observed. Muscle tone normal throughout. Strength as below      Right Left   Shoulder abduction        5 5   Elbow extension 5 5   Elbow flexion 5 5   Wrist extension         5 5   Finger extension 5 5   ADM 5 5   FDI 5 5   APB 4+ 4+   Hip flexion 5 5   Knee flexion 5 5   Knee extension 5 5   Dorsiflexion 5 5   Plantar flexion 5 5        Deep Tendon     Right Left   Biceps 1 2   BRD 1 1   Triceps 1 1   Patellar 2 1   Achilles 1 1   Plantar equivocal equivocal   Clonus Absent Absent        Sensory: Decreased sensation to light touch in bilateral finger tips and below the ankles bilaterally. Decreased sensation of temperature in the feet compared to hands. Vibration is ~1-2 seconds in bilateral great toes, ~5  seconds in the right ankle and ~3 seconds in the left ankle. Proprioception is intact throughout. Negative Romberg.      Coordination: Finger-nose-finger and heel-shin intact without dysmetria.      Gait: Steady casual gait but cautious. Able to walk on toes, heels with mild difficulty. Moderate difficulties with tandem gait.      Data reviewed on previous visits    Imaging:  MRI brain 7/2023  IMPRESSION:  1. No acute intracranial pathology.  2. Mild leukoaraiosis and parenchymal volume loss, most likely sequela  of chronic microvascular ischemic disease. MRI findings atypical for  demyelinating disease.     MRI cervical 7/2023  IMPRESSION: No substantial change in multilevel spondylosis most  pronounced at C5-C6 since comparison MRI. No high-grade spinal canal  stenosis or myelopathic cord signal.       MRI lumbar 7/2023  IMPRESSION:  1. Postoperative L2-S1 fusion and posterior element decompression  changes. Spinal canal is widely patent at all levels. Persistent  dorsal epidural space fluid collection most likely represents a stable  seroma dating back to at least 2021.  2. Multilevel lumbar spondylosis as detailed in body of the report.     Procedures:  EMG 2018    Laboratory:  CMP with TAMIR, TSH normal, CBC with mild anemia, CRP normal, Vit D normal (7/2023)      Pertinent Investigations since last visit:   EMG 8/10/2023  Interpretation:  This is an abnormal examination. 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 In spring time patient developed worsening numbness/tingling in the extremities, imbalance, and sense of weakness. She was  given prednisone/gabapentin in July by Oncologist, which resulted in significant improvement. EMG showed mild length dependent sensorimotor axonal polyneuropathy. She also had MRI brain/cervical/lumbar imaging, which was unrevealing. I suspect polyneuropathy is contributing to patient's symptoms, but there are likely other contributors such as orthopedic issues and non specific fatigue. Polyneuropathy is likely related to prior chemotherapy in part, but given timing of symptoms is unlikely to explain the full picture. At this junction I don't see an indication for immunotherapy from a neurological perspective. We discussed increasing gabapentin as below and can make further adjustments as needed.       Plan:  - Increase gabapentin to 600 mg TID  - MyChart message up in ~2 weeks    Follow up in Neurology clinic pending above    Dennis Reed MD   of Neurology  [facility]    The total time of this encounter today amounted to 40 minutes. This time included time spent with the patient, prep work, ordering tests, and performing post visit documentation.      Again, thank you for allowing me to participate in the care of your patient.        Sincerely,        Dennis Reed MD

## 2023-10-18 NOTE — MR AVS SNAPSHOT
After Visit Summary   7/11/2017    Michaela Dorman    MRN: [id number]           Patient Information     Date Of Birth          1950        Visit Information        Provider Department      7/11/2017 12:30 PM NL INFUSION CHAIR 4 [facility]        Today's Diagnoses     Encounter for antineoplastic chemotherapy    -  1    Malignant neoplasm of upper-outer quadrant of left female breast, unspecified estrogen receptor status (H)        Malignant neoplasm of upper-outer quadrant of left female breast (H)          Care Instructions    Pt to return on 8/1 for Herceptin. Copies of medication list and upcoming appointments given prior to discharge.           Follow-ups after your visit        Follow-up notes from your care team     Return in 3 weeks (on 8/1/2017).      Your next 10 appointments already scheduled     Jul 18, 2017 10:15 AM CDT   [facility]ipe [facility]   [facility]    [address] [place] [address]   [phone]            Aug 01, 2017  1:00 PM CDT   Level 2 with NL INFUSION CHAIR 4   [facility] ([facility])    [address] [place] [address] [address] [place] [address]   [phone]            Aug 16, 2017 12:30 PM CDT   LAB with [facility]   [facility] ([facility])    [address] [place] [address]   [phone]            Aug 16, 2017 12:30 PM CDT   Level 1 with NL INFUSION CHAIR 2   [facility] ([facility])    [address] [place] [address] [address] [place] [address]   [phone]            Aug 16, 2017  1:00 PM CDT   NM HEART MUGA REST with PHNM1   [facility] ([facility])    [address] [place] [address]   [phone]           Please bring a list of your medicines to the exam. (Include vitamins, minerals and over-the-counter drugs.)  You should wear comfortable clothes. Leave your valuables at home. Please bring related prior results and films.  Tell your doctor:   If you are breastfeeding or may be pregnant.   If you have had a barium test within the past few days. Barium may change the results of certain exams.   If you think you may need sedation (medicine to help you relax).  You may eat and drink as normal.  Please call your Imaging Department at your exam site with any questions.            Aug 22, 2017 11:30 AM CDT   [facility] MD   [facility]    [address] [facility] [address] [phone]            Dec 19, 2017 12:30 PM CST   Return Visit with Loren Felipe MD   [facility] ([facility])    [address] [place] [id number] [phone]              Future tests that were ordered for you today     Open Future Orders        Priority Expected Expires Ordered    NM MUGA rest test Routine  7/11/2018 7/11/2017    CBC with platelets differential Routine 8/15/2017 7/11/2018 7/11/2017    Comprehensive metabolic panel Routine 8/15/2017 7/11/2018 7/11/2017    Ca27.29  breast tumor marker Routine 8/15/2017 7/11/2018 7/11/2017            Who to contact     If you have questions or need follow up information about today's clinic visit or your schedule please contact [facility] directly at [phone].  Normal or non-critical lab and imaging results will be communicated to you by MyChart, letter or phone within 4 business days after the clinic has received the results. If you do not hear from us within 7 days, please contact the clinic through MyChart or phone. If you have a critical or abnormal lab result, we will notify you by phone as soon as possible.  Submit refill requests through [employer] or call your pharmacy and they will forward the refill request to us. Please allow 3 business days  for your refill to be completed.          Additional Information About Your Visit        MyChart Information     [facility] gives you secure access to your electronic health record. If you see a primary care provider, you can also send messages to your care team and make appointments. If you have questions, please call your primary care clinic.  If you do not have a primary care provider, please call [phone] and they will assist you.        Care EveryWhere ID     This is your Care EveryWhere ID. This could be used by other organizations to access your [facility] medical records  [id number]        Your Vitals Were     Pulse                   83            Blood Pressure from Last 3 Encounters:   07/11/17 130/74   07/11/17 126/73   06/20/17 134/70    Weight from Last 3 Encounters:   07/11/17 64.6 kg (142 lb 8 oz)   06/20/17 64.4 kg (142 lb)   05/30/17 64.5 kg (142 lb 1.6 oz)              Today, you had the following     No orders found for display       Primary Care Provider Office Phone # Fax #    Phani Tapia PA-C [phone]       [facility] [address] [facility] [address]        Equal Access to Services     SHIVANI ARTIS : Hadii aad ku hadasho Soomaali, waaxda luqadaha, qaybta kaalmada adeegyada, wale claytonin hayholan al pena. So [facility] [phone].    ATENCIÓN: Si habla español, tiene a ordoñez disposición servicios gratuitos de asistencia lingüística. Llame al [phone].    We comply with applicable federal civil rights laws and [place] laws. We do not discriminate on the basis of race, color, national origin, age, disability sex, sexual orientation or gender identity.            Thank you!     Thank you for choosing [facility]  for your care. Our goal is always to provide you with excellent care. Hearing back from our patients is one way we can continue to improve our services. Please take a few minutes to complete the written survey that you  may receive in the mail after your visit with us. Thank you!             Your Updated Medication List - Protect others around you: Learn how to safely use, store and throw away your medicines at www.disposemymeds.org.          This list is accurate as of: 7/11/17  2:14 PM.  Always use your most recent med list.                   Brand Name Dispense Instructions for use Diagnosis    ciprofloxacin 500 MG tablet    CIPRO    14 tablet    Take 1 tablet (500 mg) by mouth 2 times daily    Dysuria, Malignant neoplasm of upper-outer quadrant of left female breast (H), Nonspecific finding on examination of urine, Acute cystitis without hematuria       * FLUoxetine 20 MG capsule    PROzac    270 capsule    TAKE THREE CAPSULES BY MOUTH EVERY DAY TITRATE AS DIRECTED    Adjustment disorder with depressed mood       * FLUoxetine 20 MG capsule    PROzac    270 capsule    TAKE THREE CAPSULES BY MOUTH EVERY DAY TITRATE AS DIRECTED    Adjustment disorder with depressed mood       hydrochlorothiazide 25 MG tablet    HYDRODIURIL    90 tablet    Take 1 tablet (25 mg) by mouth daily    Essential hypertension       LORazepam 0.5 MG tablet    ATIVAN    15 tablet    Take 1 tablet (0.5 mg) by mouth every 4 hours as needed (Anxiety, Nausea/Vomiting or Sleep)    Malignant neoplasm of upper-outer quadrant of left female breast (H), Encounter for antineoplastic chemotherapy       nitroFURantoin (macrocrystal-monohydrate) 100 MG capsule    MACROBID    14 capsule    Take 1 capsule (100 mg) by mouth 2 times daily    Acute cystitis without hematuria       oxybutynin 5 MG tablet    DITROPAN    90 tablet    TAKE ONE-HALF TABLET BY MOUTH TWICE A DAY    Dysuria       priLOSEC 20 MG CR capsule   Generic drug:  omeprazole      Take by mouth daily        simvastatin 20 MG tablet    ZOCOR    30 tablet    TAKE ONE TABLET BY MOUTH AT BEDTIME    Hyperlipidemia with target LDL less than 130       TYLENOL EX ST ARTHRITIS PAIN 500 MG tablet   Generic drug:   acetaminophen      Take 500-1,000 mg by mouth every 6 hours as needed for mild pain    Dermatitis due to sun       venlafaxine 37.5 MG 24 hr capsule    EFFEXOR XR    90 capsule    Take 1 capsule (37.5 mg) by mouth daily    Adjustment disorder with depressed mood       * Notice:  This list has 2 medication(s) that are the same as other medications prescribed for you. Read the directions carefully, and ask your doctor or other care provider to review them with you.       - - -

## 2023-10-23 ENCOUNTER — MYC MEDICAL ADVICE (OUTPATIENT)
Dept: FAMILY MEDICINE | Facility: OTHER | Age: 73
End: 2023-10-23
Payer: COMMERCIAL

## 2023-10-24 ENCOUNTER — MYC MEDICAL ADVICE (OUTPATIENT)
Dept: FAMILY MEDICINE | Facility: OTHER | Age: 73
End: 2023-10-24
Payer: COMMERCIAL

## 2023-10-24 ENCOUNTER — TELEPHONE (OUTPATIENT)
Dept: FAMILY MEDICINE | Facility: OTHER | Age: 73
End: 2023-10-24
Payer: COMMERCIAL

## 2023-10-24 NOTE — TELEPHONE ENCOUNTER
Reason for Call:  Appointment Request    Patient requesting this type of appt:  office visit    Requested provider:  Phani zamorano    Reason patient unable to be scheduled: Not within requested timeframe    When does patient want to be seen/preferred time:  before November 30th    Comments: patient has an appt scheduled 11/30/2023 for hip pain but would like to be seen sooner if possible    Could we send this information to you in SVXR or would you prefer to receive a phone call?:   Patient would like to be contacted via SVXR    Call taken on 10/24/2023 at 9:19 AM by Dyan Sanchez

## 2023-10-25 ENCOUNTER — PRE VISIT (OUTPATIENT)
Dept: UROLOGY | Facility: CLINIC | Age: 73
End: 2023-10-25
Payer: COMMERCIAL

## 2023-10-25 NOTE — TELEPHONE ENCOUNTER
Reason for visit: follow up     Dx/Hx/Sx: bladder cancer     Records/imaging/labs/orders: labs and imaging not scheduled yet     At Rooming: virtual visit

## 2023-10-26 ENCOUNTER — VIRTUAL VISIT (OUTPATIENT)
Dept: ONCOLOGY | Facility: CLINIC | Age: 73
End: 2023-10-26
Attending: NURSE PRACTITIONER
Payer: COMMERCIAL

## 2023-10-26 VITALS — BODY MASS INDEX: 21.69 KG/M2 | WEIGHT: 135 LBS | HEIGHT: 66 IN

## 2023-10-26 DIAGNOSIS — G62.0 CHEMOTHERAPY-INDUCED NEUROPATHY (H): ICD-10-CM

## 2023-10-26 DIAGNOSIS — T45.1X5A CHEMOTHERAPY-INDUCED NEUROPATHY (H): ICD-10-CM

## 2023-10-26 DIAGNOSIS — C67.9 UROTHELIAL CARCINOMA OF BLADDER WITH INVASION OF MUSCLE (H): Primary | ICD-10-CM

## 2023-10-26 DIAGNOSIS — R26.89 BALANCE PROBLEMS: ICD-10-CM

## 2023-10-26 DIAGNOSIS — C50.412 MALIGNANT NEOPLASM OF UPPER-OUTER QUADRANT OF LEFT FEMALE BREAST, UNSPECIFIED ESTROGEN RECEPTOR STATUS (H): ICD-10-CM

## 2023-10-26 PROCEDURE — 99417 PROLNG OP E/M EACH 15 MIN: CPT | Mod: VID | Performed by: STUDENT IN AN ORGANIZED HEALTH CARE EDUCATION/TRAINING PROGRAM

## 2023-10-26 PROCEDURE — 99205 OFFICE O/P NEW HI 60 MIN: CPT | Mod: VID | Performed by: STUDENT IN AN ORGANIZED HEALTH CARE EDUCATION/TRAINING PROGRAM

## 2023-10-26 ASSESSMENT — PAIN SCALES - GENERAL: PAINLEVEL: MODERATE PAIN (4)

## 2023-10-26 NOTE — PROGRESS NOTES
Virtual Visit Details    Type of service:  Video Visit     Originating Location (pt. Location): Home    Distant Location (provider location):  On-site  Platform used for Video Visit: Vee

## 2023-10-26 NOTE — NURSING NOTE
Is the patient currently in the state of [place]? YES    Visit mode:VIDEO    If the visit is dropped, the patient can be reconnected by: VIDEO VISIT: Send to e-mail at: [email or web address]    Will anyone else be joining the visit? NO  (If patient encounters technical issues they should call [phone]    How would you like to obtain your AVS? MyChart    Are changes needed to the allergy or medication list? Pt stated no med changes    Reason for visit: Consult    No other vitals to report per pt    Blanca Corado VVF

## 2023-10-26 NOTE — LETTER
10/26/2023         RE: Michaela Dorman  [address]  [facility] [address]        Dear Colleague,    Thank you for referring your patient, Michaela Dorman, to the [facility]. Please see a copy of my visit note below.    Virtual Visit Details    Type of service:  Video Visit     Originating Location (pt. Location): Home    Distant Location (provider location):  On-site  Platform used for Video Visit: [facility]           PM&R Clinic Note     Patient Name: Michaela Dorman : 1950 Medical Record: [id number]     Requesting Physician/clinician: LISA Foley CNP           History of Present Illness:     Michaela Dorman is a 73 year old female with history of left-sided ER negative, HER2 positive breast adenocarcinoma and urothelial bladder cancer who presents to PM&R cancer rehab clinic for evaluation of her rehabilitation needs in the setting of chemotherapy-induced neuropathy.    Oncology history:  Sep 2016 Presented w/ left axillary mass; staging multicentric T4 lesion w/ geraldine involvement; biopsy w/ ER-, HER2+ breast cancer  Sep 2016 - 2017 Neoadjuvant AC x 4 cycles/TH x 4 cycles.  2017 Bilateral mastectomy no with geraldine evaluation; complete path CR from invasive cancer; 7 mm DCIS residual.  2017 - May 2017 Adjuvant radiation to left chestwall / axilla.  2017 - Dec 2017 Adjuvant Herceptin.  Bladder cancer:([id number])  Oct 2020 Presented w/ dysuria.  2021 Cytoscopy w/ muscle-invasive high grade urothelial cancer.  Mar 2021 TURBT.  2021 Neoadjuvant cisplatin/gem split dose complicated by TAMIR.  May 2021 Neoadjuvant carboplatin/gem.  2021 Radical cystectomy w/ ileal conduit; path high grade urothelial carcinoma muscle invasive; margins negs; nodes negative.  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  2023 Immunotherapy-related neuropathy,  steroid-responsive.      Symptoms,  Lissa presents today for an initial virtual evaluation.  She complains of significant chemotherapy-induced neuropathy symptoms in her hands and feet, symptoms are mainly burning and tingling that go up and down her extremities past wrist level and ankle level bilaterally.  She notices worsening at certain times during the day.  She does not have much numbness in her feet, it is mainly painful neuropathy symptoms.  She does endorse numbness in her hands.  Her neuropathy has affected her balance, and she has had a few falls in the last several weeks with her last fall being just 2 days ago.  She states that she fell backward onto the couch when she lost her balance, and likely did not sustain any injuries.  She has noticed significant difficulties with writing and other fine motor tasks in regards to hand function.  She also notes stiffness in her hands with the neuropathy.  Symptoms are generally worse in the morning.  She was started on gabapentin 3 times daily, and about 2 weeks ago the dose was increased to 300 mg 3 times daily.  She notes some improvement, but not complete resolution of symptoms with the gabapentin.  In terms of her nutrition, she feels that she is eating pretty well.  She does do some Ensure shakes once in a while.  She tries to remain very active, and has been walking 3 miles per day to help maintain her activity level, but does endorse significant balance difficulties when attempting to do this.  She has not tried any other medications for neuropathy, nor has had any therapies or tried acupuncture as well for her symptoms.      Therapies/HEP,  Not participating in any outpatient therapies.      Functionally,   Independent with mobility, ADLs and IADLs.  Some difficulties with balance and fine motor tasks due to neuropathy symptoms.             Past Medical and Surgical History:     Past Medical History:   Diagnosis Date    Acute kidney injury (H24)      Carotid stenosis, bilateral L80%, R40% 09/02/2020    Central stenosis of spinal canal 12/01/2017    lumbar     DDD (degenerative disc disease), lumbar 12/01/2017    Depressive disorder, not elsewhere classified     Esophageal reflux     ETOH abuse     in remission    Foraminal stenosis of lumbar region 12/01/2017    Complete lumbar spine left at various degrees and to a lesser extent the right as well.    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    Subdural hematoma (H)     Thyroid disease     Urothelial carcinoma (H)      Past Surgical History:   Procedure Laterality Date    ARTHROPLASTY SHOULDER Right 11/17/2015    ARTHROSCOPY KNEE  6/7/2012    Procedure:ARTHROSCOPY KNEE; right knee arthroscopy with partial lateral menisectomy; Surgeon:DAMIÁN MCALLISTER; Location:[facility]    BIOPSY VAGINAL N/A 10/27/2021    Procedure: DISTAL URETHRECTOMY;  Surgeon: Leonardo Robles MD;  Location: [facility] OR    COLONOSCOPY N/A 12/22/2017    Procedure: COLONOSCOPY;  colonoscopy;  Surgeon: Chuck Chand MD;  Location: [facility]    CYSTECTOMY BLADDER RADICAL, ILEAL DIVERSION, COMBINED N/A 6/1/2021    Procedure: RADICAL CYSTECTOMY  WITH ILEAL CONDUIT CREATION,BILATERAL PELVIC LYMPHADENECTOMY;  Surgeon: Leonardo Robles MD;  Location: [facility]    CYSTOSCOPY, RETROGRADES, COMBINED Bilateral 3/18/2021    Procedure: CYSTOSCOPY, WITH RETROGRADE PYELOGRAM;  Surgeon: Girish Jack MD;  Location: [facility]    CYSTOSCOPY, TRANSURETHRAL RESECTION (TUR) TUMOR BLADDER, COMBINED N/A 3/18/2021    Procedure: CYSTOSCOPY, WITH TRANSURETHRAL RESECTION BLADDER TUMOR;  Surgeon: Girish Jack MD;  Location: [facility]    ENDARTERECTOMY CAROTID Left 10/8/2020    Procedure: LEFT CAROTID ENDARTERECTOMY WITH EEG;  Surgeon: Lacho Jasmine MD;   Location: [facility]    ESOPHAGOSCOPY, GASTROSCOPY, DUODENOSCOPY (EGD), COMBINED N/A 2022    Procedure: ESOPHAGOGASTRODUODENOSCOPY, WITH BIOPSY;  Surgeon: Ramses Arenas MD;  Location: [facility]    EXCISE MASS AXILLA Left 2016    Procedure: EXCISE MASS AXILLA;  Surgeon: Keyon Blanco MD;  Location: [facility]    HC REMV CATARACT EXTRACAP,INSERT LENS, W/O ECP  2009    Right eye    INJECT EPIDURAL LUMBAR N/A 2019    Procedure: interlaminar epidual steroid injection lumbar 4-5;  Surgeon: Oskar Salvador MD;  [facility]    INJECT EPIDURAL LUMBAR Bilateral 2019    Procedure: lumbar 4-5 epidural interlaminar steroid injection;  Surgeon: Oskar Salvador MD;  [facility]    INSERT PORT VASCULAR ACCESS Right 10/7/2016    Procedure: INSERT PORT VASCULAR ACCESS;  Surgeon: Keyon Blanco MD;  [facility]    IR CHEST PORT PLACEMENT > 5 YRS OF AGE  2021    MASTECTOMY SIMPLE BILATERAL Bilateral 2017    Procedure: MASTECTOMY SIMPLE BILATERAL;  Surgeon: Keyon Blanco MD;  [facility]    OPEN REDUCTION INTERNAL FIXATION FOOT Right 3/20/2015    Procedure: OPEN REDUCTION INTERNAL FIXATION FOOT;  Surgeon: Javi Herrmann DPM;  [facility]    OPTICAL TRACKING SYSTEM FUSION SPINE POSTERIOR LUMBAR TWO LEVELS N/A 2020    Procedure: LUMBAR 2-SACRAL1 TRANSFORMINAL INTERBODY FUSION;  Surgeon: Lenny Gomes MD;  Location: [facility]    REMOVE PORT VASCULAR ACCESS Right 2018    Procedure: REMOVE PORT VASCULAR ACCESS;  right intra jugular port removal;  Surgeon: Keyon Blanco MD;  [facility]    SHOULDER SURGERY Right 2015    ZZC LIGATE FALLOPIAN TUBE      ZZC NONSPECIFIC PROCEDURE      ankle fracture surgery            Social History:     Social History     Tobacco Use    Smoking status: Former     Packs/day: 3.00     Years: 10.00     Additional pack years: 0.00     Total pack years: 30.00     Types: Cigarettes     Quit date: 1979     Years since quittin.9  "   Smokeless tobacco: Never    Tobacco comments:     approx. 3 packs daily   Substance Use Topics    Alcohol use: Not Currently     Comment: Stopped drinking a few weeks ago (july 2023)     Living situation: Lives in [place], [place]  Family support:          Functional history:     Michaela Dorman is independent with all aspects of her life.    ADLs: Independent, but struggling with fine motor tasks due to neuropathy  iADLs (medication management and finances): Independent           Family History:     Family History   Problem Relation Age of Onset    Hypertension Mother     Thyroid Disease Mother     Cerebrovascular Disease Mother     Hypertension Father     Cerebrovascular Disease Father     Cancer Father         skin    Thyroid Disease Sister     Diabetes Brother         type 2    Depression Sister     Breast Cancer Sister         age 47    Cancer Sister         skin    Cancer Brother         inside nose            Medications:     Current Outpatient Medications   Medication Sig Dispense Refill    acetaminophen (TYLENOL) 500 MG tablet Take 1,000 mg by mouth 3 times daily as needed for mild pain (500MG X 2 = 1,000MG)      ALPRAZolam (XANAX) 0.5 MG tablet TAKE 1 TABLET BY MOUTH DAILY AT BEDTIME AS NEEDED FOR SLEEP      aspirin (ASA) 81 MG chewable tablet Take 1 tablet (81 mg) by mouth daily 100 tablet 3    carvedilol (COREG) 6.25 MG tablet TAKE 1 TABLET (6.25 MG) BY MOUTH 2 TIMES DAILY (WITH MEALS) PLEASE SEND NEXT REFILL TO PRIMARY CARE AS NEPHROLOGY FOLLOW UP \"AS NEEDED\" 90 tablet 0    Cyanocobalamin (B-12 PO) Take by mouth daily       escitalopram (LEXAPRO) 20 MG tablet Take 1 tablet (20 mg) by mouth daily 90 tablet 3    gabapentin (NEURONTIN) 300 MG capsule Take 2 capsules (600 mg) by mouth 3 times daily 180 capsule 5    levothyroxine (SYNTHROID/LEVOTHROID) 25 MCG tablet TAKE 1 TABLET BY MOUTH EVERY DAY 90 tablet 0    LORazepam (ATIVAN) 0.5 MG tablet TAKE 1 TABLET (0.5 MG) BY MOUTH EVERY 4 HOURS AS NEEDED " "FOR ANXIETY, NAUSEA OR SLEEP 25 tablet 3    meclizine 25 MG CHEW Take 25 mg by mouth every 6 hours as needed for dizziness       methylPREDNISolone (MEDROL DOSEPAK) 4 MG tablet therapy pack Follow Package Directions (Patient not taking: Reported on 10/12/2023) 21 tablet 0    omeprazole (PRILOSEC) 20 MG DR capsule Take 20 mg by mouth daily      oxyCODONE-acetaminophen (PERCOCET) 5-325 MG tablet TAKE 1 TABLET BY MOUTH EVERY 6 HOURS AS NEEDED FORSEVERE PAIN (7-10) (Patient not taking: Reported on 10/12/2023) 40 tablet 0    oxyCODONE-acetaminophen (PERCOCET) 5-325 MG tablet TAKE 1/2-1 TABLETS BY MOUTHEVERY 6 HOURS AS NEEDED FORSEVERE PAIN (Patient not taking: Reported on 10/12/2023) 60 tablet 0    predniSONE (DELTASONE) 5 MG tablet Take 6 tablets (30 mg) by mouth daily for 7 days, THEN 4 tablets (20 mg) daily for 7 days, THEN 2 tablets (10 mg) daily for 7 days, THEN 1 tablet (5 mg) daily for 7 days. (Patient not taking: Reported on 10/12/2023) 91 tablet 0    prochlorperazine (COMPAZINE) 10 MG tablet TAKE 1/2 TABLET BY MOUTH EVERY 6 HOURS AS NEEDED FOR NAUSEA/VOMITING 30 tablet 2    STOOL SOFTENER/LAXATIVE 50-8.6 MG tablet TAKE 2 TABLETS BY MOUTH DAILY AT BEDTIME 100 tablet 0            Allergies:     Allergies   Allergen Reactions    Oxybutynin      Patient reports symptoms of nausea and mind fogginess              ROS:     A focused ROS is negative other than the symptoms noted above in the HPI.         Physical Examiniation:     VITAL SIGNS: Ht 1.676 m (5' 6\")   Wt 61.2 kg (135 lb)   BMI 21.79 kg/m    BMI: Estimated body mass index is 21.79 kg/m  as calculated from the following:    Height as of this encounter: 1.676 m (5' 6\").    Weight as of this encounter: 61.2 kg (135 lb).    Gen: NAD, pleasant and cooperative   HEENT: Atraumatic, normocephalic, extraocular movements appear intact  Pulm: non-labored breathing in room air  Neuro/MSK:   Orientation: Oriented to person, time, place and situation, Exhibits good " insight into her condition and ongoing treatment  Motor: Observed moving upper extremities actively against gravity           Laboratory/Imaging:     CT Chest Pulmonary Embolism with Contrast (9/3/23):  IMPRESSION:  1.  No evidence for pulmonary embolism.  2.  Severe coronary artery calcification.           Assessment/Plan:   Michaela Dorman is a 73 year old female with history of left-sided ER negative, HER2 positive breast adenocarcinoma and urothelial bladder cancer who presents to PM&R cancer rehab clinic for evaluation of her rehabilitation needs in the setting of chemotherapy-induced neuropathy.  Multiple rehabilitation considerations were discussed with Kristi at today's visit.  She would benefit from occupational therapy for difficulty with fine motor skills as well as physical therapy for work on balance in the setting of her neuropathy symptoms.  These referrals were placed today.  In addition, we discussed gabapentin, and since her dose was recently increased would not recommend increasing the dose further to allow time to see if this has been helping as well as because of the side effects that she is already experiencing with it in terms of sleepiness/grogginess.  We discussed the option of gabapentin cream which can be of complementary topical option that she can use, and this was prescribed today.  Lastly, she is interested in acupuncture as an alternative therapy to see if this helps her neuropathy symptoms.  This referral was also placed today.  We will plan a 3-month return virtual visit.  Kristi is in agreement with this plan.    Patient education: In depth discussion and education was provided about the assessment and implications of each of the below recommendations for management. Patient indicated readiness to learn, all questions were answered and understanding of material presented was confirmed.  Therapy/equipment/braces:  Physical therapy referral placed for work on balance and gait in the  setting of neuropathy.  Occupational Therapy referral placed for hand therapy for fine motor deficits related to neuropathy.  Acupuncture referral placed for help with neuropathy symptoms.  Start protein supplementation daily.  Aim for 1 protein drink daily.  Can try either Premier protein shakes or [employer] fair life core power protein shakes.  Medications:  Continue gabapentin at 300 mg 3 times daily.  Gabapentin cream prescribed.  Follow up: 3-month return virtual visit.    Barbara Pena MD  Physical Medicine & Rehabilitation    I appreciate the opportunity to participate in the care of your patient.     90 minutes spent on the date of the encounter doing chart review, history and exam, documentation and further activities as noted above.

## 2023-10-26 NOTE — PATIENT INSTRUCTIONS
It was very nice to meet you today.    1.  A physical therapy referral was placed to help with your balance in the setting of your neuropathy symptoms.  2.  An Occupational Therapy referral was placed for hand therapy to help with some of the tasks that have become difficult because of your neuropathy symptoms.  3.  An acupuncture referral was placed to help with your symptoms.  4.  Continue gabapentin orally at your current dose.  5.  Gabapentin cream was prescribed and can be used up to 3 times a day as needed to help with your neuropathy symptoms.  6.  Start daily protein shakes.  Aim for 1 protein shake daily.  You can try either Premier protein shakes or [employer] fair life core power protein shakes.  7.  Follow-up with Dr. Pena in 3 months for a return virtual visit.

## 2023-10-26 NOTE — PROGRESS NOTES
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    Oncology history:  Sep 2016 Presented w/ left axillary mass; staging multicentric T4 lesion w/ geraldine involvement; biopsy w/ ER-, HER2+ breast cancer  Sep 2016 - 2017 Neoadjuvant AC x 4 cycles/TH x 4 cycles.  2017 Bilateral mastectomy no with geraldine evaluation; complete path CR from invasive cancer; 7 mm DCIS residual.  2017 - May 2017 Adjuvant radiation to left chestwall / axilla.  2017 - Dec 2017 Adjuvant Herceptin.  Bladder cancer:([id number])  Oct 2020 Presented w/ dysuria.  2021 Cytoscopy w/ muscle-invasive high grade urothelial cancer.  Mar 2021 TURBT.  2021 Neoadjuvant cisplatin/gem split dose complicated by TAMIR.  May 2021 Neoadjuvant carboplatin/gem.  2021 Radical cystectomy w/ ileal conduit; path high grade urothelial carcinoma muscle invasive; margins negs; nodes negative.  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  2023 Immunotherapy-related neuropathy, steroid-responsive.      Symptoms,  Lissa presents today for an initial virtual evaluation.  She complains of significant chemotherapy-induced neuropathy symptoms in her hands and feet, symptoms are mainly burning and tingling that go up and down her extremities past wrist level and ankle level bilaterally.  She notices worsening at certain times during the day.  She does not have much numbness in her feet, it is mainly painful neuropathy symptoms.  She does endorse numbness in her  hands.  Her neuropathy has affected her balance, and she has had a few falls in the last several weeks with her last fall being just 2 days ago.  She states that she fell backward onto the couch when she lost her balance, and likely did not sustain any injuries.  She has noticed significant difficulties with writing and other fine motor tasks in regards to hand function.  She also notes stiffness in her hands with the neuropathy.  Symptoms are generally worse in the morning.  She was started on gabapentin 3 times daily, and about 2 weeks ago the dose was increased to 300 mg 3 times daily.  She notes some improvement, but not complete resolution of symptoms with the gabapentin.  In terms of her nutrition, she feels that she is eating pretty well.  She does do some Ensure shakes once in a while.  She tries to remain very active, and has been walking 3 miles per day to help maintain her activity level, but does endorse significant balance difficulties when attempting to do this.  She has not tried any other medications for neuropathy, nor has had any therapies or tried acupuncture as well for her symptoms.      Therapies/HEP,  Not participating in any outpatient therapies.      Functionally,   Independent with mobility, ADLs and IADLs.  Some difficulties with balance and fine motor tasks due to neuropathy symptoms.             Past Medical and Surgical History:     Past Medical History:   Diagnosis Date    Acute kidney injury (H24)     Carotid stenosis, bilateral L80%, R40% 09/02/2020    Central stenosis of spinal canal 12/01/2017    lumbar     DDD (degenerative disc disease), lumbar 12/01/2017    Depressive disorder, not elsewhere classified     Esophageal reflux     ETOH abuse     in remission    Foraminal stenosis of lumbar region 12/01/2017    Complete lumbar spine left at various degrees and to a lesser extent the right as well.    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    Subdural hematoma (H)     Thyroid disease     Urothelial carcinoma (H)      Past Surgical History:   Procedure Laterality Date    ARTHROPLASTY SHOULDER Right 11/17/2015    ARTHROSCOPY KNEE  6/7/2012    Procedure:ARTHROSCOPY KNEE; right knee arthroscopy with partial lateral menisectomy; Surgeon:DAMIÁN MCALLISTER; Location:[facility]    BIOPSY VAGINAL N/A 10/27/2021    Procedure: DISTAL URETHRECTOMY;  Surgeon: Leonardo Robles MD;  Location: [facility] OR    COLONOSCOPY N/A 12/22/2017    Procedure: COLONOSCOPY;  colonoscopy;  Surgeon: Chuck Chand MD;  Location: [facility]    CYSTECTOMY BLADDER RADICAL, ILEAL DIVERSION, COMBINED N/A 6/1/2021    Procedure: RADICAL CYSTECTOMY  WITH ILEAL CONDUIT CREATION,BILATERAL PELVIC LYMPHADENECTOMY;  Surgeon: Leonardo Robles MD;  Location: [facility]    CYSTOSCOPY, RETROGRADES, COMBINED Bilateral 3/18/2021    Procedure: CYSTOSCOPY, WITH RETROGRADE PYELOGRAM;  Surgeon: Girish Jack MD;  Location: [facility]    CYSTOSCOPY, TRANSURETHRAL RESECTION (TUR) TUMOR BLADDER, COMBINED N/A 3/18/2021    Procedure: CYSTOSCOPY, WITH TRANSURETHRAL RESECTION BLADDER TUMOR;  Surgeon: Girish Jack MD;  Location: [facility]    ENDARTERECTOMY CAROTID Left 10/8/2020    Procedure: LEFT CAROTID ENDARTERECTOMY WITH EEG;  Surgeon: Lacho Jasmine MD;  Location: [facility]    ESOPHAGOSCOPY, GASTROSCOPY, DUODENOSCOPY (EGD), COMBINED N/A 6/20/2022    Procedure: ESOPHAGOGASTRODUODENOSCOPY, WITH BIOPSY;  Surgeon: Ramses Arenas MD;  Location: [facility]    EXCISE MASS AXILLA Left 9/16/2016    Procedure: EXCISE MASS AXILLA;  Surgeon: Keyon Blanco MD;  [facility]: [facility]    HC REMV CATARACT EXTRACAP,INSERT LENS, W/O ECP  6/25/2009    Right eye    INJECT EPIDURAL LUMBAR N/A 4/11/2019    Procedure: interlaminar epidual steroid injection lumbar 4-5;   Surgeon: Oskar Salvador MD;  [facility]    INJECT EPIDURAL LUMBAR Bilateral 2019    Procedure: lumbar 4-5 epidural interlaminar steroid injection;  Surgeon: Oskar Salvador MD;  [facility]    INSERT PORT VASCULAR ACCESS Right 10/7/2016    Procedure: INSERT PORT VASCULAR ACCESS;  Surgeon: Keyon Blanco MD;  [facility]    IR CHEST PORT PLACEMENT > 5 YRS OF AGE  2021    MASTECTOMY SIMPLE BILATERAL Bilateral 2017    Procedure: MASTECTOMY SIMPLE BILATERAL;  Surgeon: Keyon Blanco MD;  [facility]    OPEN REDUCTION INTERNAL FIXATION FOOT Right 3/20/2015    Procedure: OPEN REDUCTION INTERNAL FIXATION FOOT;  Surgeon: Javi Herrmann DPM;  [facility]    OPTICAL TRACKING SYSTEM FUSION SPINE POSTERIOR LUMBAR TWO LEVELS N/A 2020    Procedure: LUMBAR 2-SACRAL1 TRANSFORMINAL INTERBODY FUSION;  Surgeon: Lenny Gomes MD;  Location: [facility]    REMOVE PORT VASCULAR ACCESS Right 2018    Procedure: REMOVE PORT VASCULAR ACCESS;  right intra jugular port removal;  Surgeon: Keyon Blanco MD;  [facility]    SHOULDER SURGERY Right 2015    ZZC LIGATE FALLOPIAN TUBE      ZZC NONSPECIFIC PROCEDURE      ankle fracture surgery            Social History:     Social History     Tobacco Use    Smoking status: Former     Packs/day: 3.00     Years: 10.00     Additional pack years: 0.00     Total pack years: 30.00     Types: Cigarettes     Quit date: 1979     Years since quittin.9    Smokeless tobacco: Never    Tobacco comments:     approx. 3 packs daily   Substance Use Topics    Alcohol use: Not Currently     Comment: Stopped drinking a few weeks ago (2023)     Living situation: Lives in [place], [place]  Family support:          Functional history:     Michaela Dorman is independent with all aspects of her life.    ADLs: Independent, but struggling with fine motor tasks due to neuropathy  iADLs (medication management and finances): Independent           " Family History:     Family History   Problem Relation Age of Onset    Hypertension Mother     Thyroid Disease Mother     Cerebrovascular Disease Mother     Hypertension Father     Cerebrovascular Disease Father     Cancer Father         skin    Thyroid Disease Sister     Diabetes Brother         type 2    Depression Sister     Breast Cancer Sister         age 47    Cancer Sister         skin    Cancer Brother         inside nose            Medications:     Current Outpatient Medications   Medication Sig Dispense Refill    acetaminophen (TYLENOL) 500 MG tablet Take 1,000 mg by mouth 3 times daily as needed for mild pain (500MG X 2 = 1,000MG)      ALPRAZolam (XANAX) 0.5 MG tablet TAKE 1 TABLET BY MOUTH DAILY AT BEDTIME AS NEEDED FOR SLEEP      aspirin (ASA) 81 MG chewable tablet Take 1 tablet (81 mg) by mouth daily 100 tablet 3    carvedilol (COREG) 6.25 MG tablet TAKE 1 TABLET (6.25 MG) BY MOUTH 2 TIMES DAILY (WITH MEALS) PLEASE SEND NEXT REFILL TO PRIMARY CARE AS NEPHROLOGY FOLLOW UP \"AS NEEDED\" 90 tablet 0    Cyanocobalamin (B-12 PO) Take by mouth daily       escitalopram (LEXAPRO) 20 MG tablet Take 1 tablet (20 mg) by mouth daily 90 tablet 3    gabapentin (NEURONTIN) 300 MG capsule Take 2 capsules (600 mg) by mouth 3 times daily 180 capsule 5    levothyroxine (SYNTHROID/LEVOTHROID) 25 MCG tablet TAKE 1 TABLET BY MOUTH EVERY DAY 90 tablet 0    LORazepam (ATIVAN) 0.5 MG tablet TAKE 1 TABLET (0.5 MG) BY MOUTH EVERY 4 HOURS AS NEEDED FOR ANXIETY, NAUSEA OR SLEEP 25 tablet 3    meclizine 25 MG CHEW Take 25 mg by mouth every 6 hours as needed for dizziness       methylPREDNISolone (MEDROL DOSEPAK) 4 MG tablet therapy pack Follow Package Directions (Patient not taking: Reported on 10/12/2023) 21 tablet 0    omeprazole (PRILOSEC) 20 MG DR capsule Take 20 mg by mouth daily      oxyCODONE-acetaminophen (PERCOCET) 5-325 MG tablet TAKE 1 TABLET BY MOUTH EVERY 6 HOURS AS NEEDED FORSEVERE PAIN (7-10) (Patient not taking: " "Reported on 10/12/2023) 40 tablet 0    oxyCODONE-acetaminophen (PERCOCET) 5-325 MG tablet TAKE 1/2-1 TABLETS BY MOUTHEVERY 6 HOURS AS NEEDED FORSEVERE PAIN (Patient not taking: Reported on 10/12/2023) 60 tablet 0    predniSONE (DELTASONE) 5 MG tablet Take 6 tablets (30 mg) by mouth daily for 7 days, THEN 4 tablets (20 mg) daily for 7 days, THEN 2 tablets (10 mg) daily for 7 days, THEN 1 tablet (5 mg) daily for 7 days. (Patient not taking: Reported on 10/12/2023) 91 tablet 0    prochlorperazine (COMPAZINE) 10 MG tablet TAKE 1/2 TABLET BY MOUTH EVERY 6 HOURS AS NEEDED FOR NAUSEA/VOMITING 30 tablet 2    STOOL SOFTENER/LAXATIVE 50-8.6 MG tablet TAKE 2 TABLETS BY MOUTH DAILY AT BEDTIME 100 tablet 0            Allergies:     Allergies   Allergen Reactions    Oxybutynin      Patient reports symptoms of nausea and mind fogginess              ROS:     A focused ROS is negative other than the symptoms noted above in the HPI.         Physical Examiniation:     VITAL SIGNS: Ht 1.676 m (5' 6\")   Wt 61.2 kg (135 lb)   BMI 21.79 kg/m    BMI: Estimated body mass index is 21.79 kg/m  as calculated from the following:    Height as of this encounter: 1.676 m (5' 6\").    Weight as of this encounter: 61.2 kg (135 lb).    Gen: NAD, pleasant and cooperative   HEENT: Atraumatic, normocephalic, extraocular movements appear intact  Pulm: non-labored breathing in room air  Neuro/MSK:   Orientation: Oriented to person, time, place and situation, Exhibits good insight into her condition and ongoing treatment  Motor: Observed moving upper extremities actively against gravity           Laboratory/Imaging:     CT Chest Pulmonary Embolism with Contrast (9/3/23):  IMPRESSION:  1.  No evidence for pulmonary embolism.  2.  Severe coronary artery calcification.           Assessment/Plan:   Michaela Dorman is a 73 year old female with history of left-sided ER negative, HER2 positive breast adenocarcinoma and urothelial bladder cancer who presents to " [facility] cancer rehab clinic for evaluation of her rehabilitation needs in the setting of chemotherapy-induced neuropathy.  Multiple rehabilitation considerations were discussed with Kristi at today's visit.  She would benefit from occupational therapy for difficulty with fine motor skills as well as physical therapy for work on balance in the setting of her neuropathy symptoms.  These referrals were placed today.  In addition, we discussed gabapentin, and since her dose was recently increased would not recommend increasing the dose further to allow time to see if this has been helping as well as because of the side effects that she is already experiencing with it in terms of sleepiness/grogginess.  We discussed the option of gabapentin cream which can be of complementary topical option that she can use, and this was prescribed today.  Lastly, she is interested in acupuncture as an alternative therapy to see if this helps her neuropathy symptoms.  This referral was also placed today.  We will plan a 3-month return virtual visit.  Kristi is in agreement with this plan.    Patient education: In depth discussion and education was provided about the assessment and implications of each of the below recommendations for management. Patient indicated readiness to learn, all questions were answered and understanding of material presented was confirmed.  Therapy/equipment/braces:  Physical therapy referral placed for work on balance and gait in the setting of neuropathy.  Occupational Therapy referral placed for hand therapy for fine motor deficits related to neuropathy.  Acupuncture referral placed for help with neuropathy symptoms.  Start protein supplementation daily.  Aim for 1 protein drink daily.  Can try either Premier protein shakes or [employer] fair life core power protein shakes.  Medications:  Continue gabapentin at 300 mg 3 times daily.  Gabapentin cream prescribed.  Follow up: 3-month return virtual visit.    Barbara  MD Jean  Physical Medicine & Rehabilitation    I appreciate the opportunity to participate in the care of your patient.     90 minutes spent on the date of the encounter doing chart review, history and exam, documentation and further activities as noted above.

## 2023-10-27 ENCOUNTER — TELEPHONE (OUTPATIENT)
Dept: ONCOLOGY | Facility: CLINIC | Age: 73
End: 2023-10-27

## 2023-10-27 ENCOUNTER — NURSE TRIAGE (OUTPATIENT)
Dept: FAMILY MEDICINE | Facility: OTHER | Age: 73
End: 2023-10-27
Payer: COMMERCIAL

## 2023-10-27 DIAGNOSIS — T45.1X5A CHEMOTHERAPY-INDUCED NEUROPATHY (H): ICD-10-CM

## 2023-10-27 DIAGNOSIS — R26.89 BALANCE PROBLEMS: ICD-10-CM

## 2023-10-27 DIAGNOSIS — C67.9 UROTHELIAL CARCINOMA OF BLADDER WITH INVASION OF MUSCLE (H): ICD-10-CM

## 2023-10-27 DIAGNOSIS — C50.412 MALIGNANT NEOPLASM OF UPPER-OUTER QUADRANT OF LEFT FEMALE BREAST, UNSPECIFIED ESTROGEN RECEPTOR STATUS (H): ICD-10-CM

## 2023-10-27 DIAGNOSIS — G62.0 CHEMOTHERAPY-INDUCED NEUROPATHY (H): ICD-10-CM

## 2023-10-27 NOTE — TELEPHONE ENCOUNTER
Rupali pharmacist from [facility] called stating they received order from Dr. Barbara Pena however they do not compound the Gabapentin so prescription would need to be sent to a pharmacy that does compound topical Gabapentin.     1342 This writer spoke to patient, about situation and in agreement with sending prescription to [facility] pharmacy and mail delivery in next couple days.     1347 This writer spoke to [facility] [phone] who verified they can compound/fulfill Gabapentin prescription. Inform pt to call pharmacy later today to confirm okay to mail/send to patient.     1353 This writer updated Lissa to call [facility] pharmacy (provided phone number)  in couple hours tocheck status of prescription as pharmacy needs to hear from patient if ok to deliver. Lissa verbalized understanding.

## 2023-10-27 NOTE — TELEPHONE ENCOUNTER
Patient complains of constant left lower abdominal pain, for a few days, that has worsened this AM. Rates pain 6/10, has history of urothelial cancer with urostomy. Patient advised with history and worsening constant pain to be seen in ED for evaluation. Patient agreeable to plan and will head in.    Reason for Disposition   MILD TO MODERATE constant pain lasting > 2 hours    Additional Information   Negative: Passed out (i.e., fainted, collapsed and was not responding)   Negative: Shock suspected (e.g., cold/pale/clammy skin, too weak to stand, low BP, rapid pulse)   Negative: Sounds like a life-threatening emergency to the triager   Negative: Followed an abdomen (stomach) injury   Negative: Chest pain   Negative: Abdominal pain and pregnant < 20 weeks   Negative: Abdominal pain and pregnant 20 or more weeks   Negative: Pain is mainly in upper abdomen (if needed ask: 'is it mainly above the belly button?')   Negative: Abdomen bloating or swelling are main symptoms   Negative: SEVERE abdominal pain (e.g., excruciating)   Negative: Vomiting red blood or black (coffee ground) material   Negative: Blood in bowel movements  (Exception: Blood on surface of BM with constipation.)   Negative: Black or tarry bowel movements  (Exception: Chronic-unchanged black-grey BMs AND is taking iron pills or Pepto-Bismol.)    Protocols used: Abdominal Pain - Female-A-[place]

## 2023-10-28 DIAGNOSIS — G89.4 CHRONIC PAIN SYNDROME: ICD-10-CM

## 2023-10-28 DIAGNOSIS — C67.9 UROTHELIAL CARCINOMA OF BLADDER WITH INVASION OF MUSCLE (H): ICD-10-CM

## 2023-10-29 ENCOUNTER — APPOINTMENT (OUTPATIENT)
Dept: CT IMAGING | Facility: CLINIC | Age: 73
End: 2023-10-29
Attending: NURSE PRACTITIONER
Payer: MEDICARE

## 2023-10-29 ENCOUNTER — HOSPITAL ENCOUNTER (EMERGENCY)
Facility: CLINIC | Age: 73
Discharge: HOME | End: 2023-10-29
Attending: NURSE PRACTITIONER | Admitting: NURSE PRACTITIONER
Payer: MEDICARE

## 2023-10-29 VITALS
RESPIRATION RATE: 19 BRPM | TEMPERATURE: 98.2 F | DIASTOLIC BLOOD PRESSURE: 78 MMHG | HEIGHT: 66 IN | OXYGEN SATURATION: 100 % | SYSTOLIC BLOOD PRESSURE: 141 MMHG | BODY MASS INDEX: 21.69 KG/M2 | WEIGHT: 135 LBS | HEART RATE: 81 BPM

## 2023-10-29 DIAGNOSIS — R10.32 ABDOMINAL PAIN, LEFT LOWER QUADRANT: ICD-10-CM

## 2023-10-29 DIAGNOSIS — K52.9 NON-SPECIFIC COLITIS: ICD-10-CM

## 2023-10-29 LAB
ALBUMIN SERPL BCG-MCNC: 3.9 G/DL (ref 3.5–5.2)
ALBUMIN UR-MCNC: NEGATIVE MG/DL
ALP SERPL-CCNC: 146 U/L (ref 35–104)
ALT SERPL W P-5'-P-CCNC: 18 U/L (ref 0–50)
ANION GAP SERPL CALCULATED.3IONS-SCNC: 15 MMOL/L (ref 7–15)
APPEARANCE UR: CLEAR
AST SERPL W P-5'-P-CCNC: 32 U/L (ref 0–45)
BACTERIA #/AREA URNS HPF: ABNORMAL /HPF
BASOPHILS # BLD AUTO: 0.1 10E3/UL (ref 0–0.2)
BASOPHILS NFR BLD AUTO: 0 %
BILIRUB SERPL-MCNC: 0.3 MG/DL
BILIRUB UR QL STRIP: NEGATIVE
BUN SERPL-MCNC: 20.2 MG/DL (ref 8–23)
CALCIUM SERPL-MCNC: 9.7 MG/DL (ref 8.8–10.2)
CHLORIDE SERPL-SCNC: 99 MMOL/L (ref 98–107)
COLOR UR AUTO: ABNORMAL
CREAT SERPL-MCNC: 0.92 MG/DL (ref 0.51–0.95)
DEPRECATED HCO3 PLAS-SCNC: 21 MMOL/L (ref 22–29)
EGFRCR SERPLBLD CKD-EPI 2021: 65 ML/MIN/1.73M2
EOSINOPHIL # BLD AUTO: 0.4 10E3/UL (ref 0–0.7)
EOSINOPHIL NFR BLD AUTO: 3 %
ERYTHROCYTE [DISTWIDTH] IN BLOOD BY AUTOMATED COUNT: 13.8 % (ref 10–15)
GLUCOSE SERPL-MCNC: 95 MG/DL (ref 70–99)
GLUCOSE UR STRIP-MCNC: NEGATIVE MG/DL
HCT VFR BLD AUTO: 32.2 % (ref 35–47)
HGB BLD-MCNC: 10.4 G/DL (ref 11.7–15.7)
HGB UR QL STRIP: NEGATIVE
IMM GRANULOCYTES # BLD: 0 10E3/UL
IMM GRANULOCYTES NFR BLD: 0 %
KETONES UR STRIP-MCNC: NEGATIVE MG/DL
LEUKOCYTE ESTERASE UR QL STRIP: ABNORMAL
LYMPHOCYTES # BLD AUTO: 1.9 10E3/UL (ref 0.8–5.3)
LYMPHOCYTES NFR BLD AUTO: 17 %
MCH RBC QN AUTO: 33.3 PG (ref 26.5–33)
MCHC RBC AUTO-ENTMCNC: 32.3 G/DL (ref 31.5–36.5)
MCV RBC AUTO: 103 FL (ref 78–100)
MONOCYTES # BLD AUTO: 1.3 10E3/UL (ref 0–1.3)
MONOCYTES NFR BLD AUTO: 12 %
MUCOUS THREADS #/AREA URNS LPF: PRESENT /LPF
NEUTROPHILS # BLD AUTO: 7.6 10E3/UL (ref 1.6–8.3)
NEUTROPHILS NFR BLD AUTO: 68 %
NITRATE UR QL: POSITIVE
NRBC # BLD AUTO: 0 10E3/UL
NRBC BLD AUTO-RTO: 0 /100
PH UR STRIP: 6 [PH] (ref 5–7)
PLATELET # BLD AUTO: 332 10E3/UL (ref 150–450)
POTASSIUM SERPL-SCNC: 4.4 MMOL/L (ref 3.4–5.3)
PROT SERPL-MCNC: 7.1 G/DL (ref 6.4–8.3)
RBC # BLD AUTO: 3.12 10E6/UL (ref 3.8–5.2)
RBC URINE: <1 /HPF
SODIUM SERPL-SCNC: 135 MMOL/L (ref 135–145)
SP GR UR STRIP: 1.01 (ref 1–1.03)
UROBILINOGEN UR STRIP-MCNC: NORMAL MG/DL
WBC # BLD AUTO: 11.2 10E3/UL (ref 4–11)
WBC URINE: 4 /HPF

## 2023-10-29 PROCEDURE — 250N000011 HC RX IP 250 OP 636: Performed by: NURSE PRACTITIONER

## 2023-10-29 PROCEDURE — 258N000003 HC RX IP 258 OP 636: Performed by: NURSE PRACTITIONER

## 2023-10-29 PROCEDURE — 250N000011 HC RX IP 250 OP 636: Mod: JZ | Performed by: EMERGENCY MEDICINE

## 2023-10-29 PROCEDURE — 96374 THER/PROPH/DIAG INJ IV PUSH: CPT | Mod: 59 | Performed by: NURSE PRACTITIONER

## 2023-10-29 PROCEDURE — 96361 HYDRATE IV INFUSION ADD-ON: CPT | Performed by: NURSE PRACTITIONER

## 2023-10-29 PROCEDURE — 36415 COLL VENOUS BLD VENIPUNCTURE: CPT | Performed by: NURSE PRACTITIONER

## 2023-10-29 PROCEDURE — 81001 URINALYSIS AUTO W/SCOPE: CPT | Performed by: NURSE PRACTITIONER

## 2023-10-29 PROCEDURE — 99284 EMERGENCY DEPT VISIT MOD MDM: CPT | Performed by: NURSE PRACTITIONER

## 2023-10-29 PROCEDURE — 87086 URINE CULTURE/COLONY COUNT: CPT | Performed by: NURSE PRACTITIONER

## 2023-10-29 PROCEDURE — 250N000009 HC RX 250: Performed by: NURSE PRACTITIONER

## 2023-10-29 PROCEDURE — 80053 COMPREHEN METABOLIC PANEL: CPT | Performed by: NURSE PRACTITIONER

## 2023-10-29 PROCEDURE — 74177 CT ABD & PELVIS W/CONTRAST: CPT | Mod: MA

## 2023-10-29 PROCEDURE — 85004 AUTOMATED DIFF WBC COUNT: CPT | Performed by: NURSE PRACTITIONER

## 2023-10-29 PROCEDURE — 99285 EMERGENCY DEPT VISIT HI MDM: CPT | Mod: 25 | Performed by: NURSE PRACTITIONER

## 2023-10-29 PROCEDURE — 96376 TX/PRO/DX INJ SAME DRUG ADON: CPT | Performed by: NURSE PRACTITIONER

## 2023-10-29 RX ORDER — HEPARIN SODIUM (PORCINE) LOCK FLUSH IV SOLN 100 UNIT/ML 100 UNIT/ML
5-10 SOLUTION INTRAVENOUS
Status: DISCONTINUED | OUTPATIENT
Start: 2023-10-29 | End: 2023-10-29 | Stop reason: HOSPADM

## 2023-10-29 RX ORDER — HYDROMORPHONE HYDROCHLORIDE 1 MG/ML
0.5 INJECTION, SOLUTION INTRAMUSCULAR; INTRAVENOUS; SUBCUTANEOUS ONCE
Status: COMPLETED | OUTPATIENT
Start: 2023-10-29 | End: 2023-10-29

## 2023-10-29 RX ORDER — IOPAMIDOL 755 MG/ML
500 INJECTION, SOLUTION INTRAVASCULAR ONCE
Status: COMPLETED | OUTPATIENT
Start: 2023-10-29 | End: 2023-10-29

## 2023-10-29 RX ORDER — OXYCODONE HYDROCHLORIDE 5 MG/1
5 TABLET ORAL EVERY 6 HOURS PRN
Qty: 12 TABLET | Refills: 0 | Status: SHIPPED | OUTPATIENT
Start: 2023-10-29 | End: 2024-02-19

## 2023-10-29 RX ADMIN — HYDROMORPHONE HYDROCHLORIDE 0.5 MG: 1 INJECTION, SOLUTION INTRAMUSCULAR; INTRAVENOUS; SUBCUTANEOUS at 13:34

## 2023-10-29 RX ADMIN — IOPAMIDOL 66 ML: 755 INJECTION, SOLUTION INTRAVENOUS at 12:21

## 2023-10-29 RX ADMIN — HYDROMORPHONE HYDROCHLORIDE 0.5 MG: 1 INJECTION, SOLUTION INTRAMUSCULAR; INTRAVENOUS; SUBCUTANEOUS at 12:01

## 2023-10-29 RX ADMIN — SODIUM CHLORIDE 500 ML: 9 INJECTION, SOLUTION INTRAVENOUS at 13:03

## 2023-10-29 RX ADMIN — HEPARIN 5 ML: 100 SYRINGE at 13:53

## 2023-10-29 RX ADMIN — SODIUM CHLORIDE 60 ML: 9 INJECTION, SOLUTION INTRAVENOUS at 12:21

## 2023-10-29 ASSESSMENT — ACTIVITIES OF DAILY LIVING (ADL)
ADLS_ACUITY_SCORE: 35
ADLS_ACUITY_SCORE: 35

## 2023-10-29 NOTE — DISCHARGE INSTRUCTIONS
Urine culture pending, you will be contacted if positive for infection.    MiraLax 1 capful in 8-12 ounces of water daily for 3-5 days, you can stop if starting to have loose/watery stools.    Tylenol 650 mg every 4-6 hours as needed for pain.  Oxycodone 5 mg every 6 hours as needed for severe pain. Do not drive or drink alcohol on this medication.     Make appointment for recheck in clinic this week.    Return to the emergency department for fevers, worsening pain, vomiting, black or bloody stool, or any other symptoms of concern.

## 2023-10-29 NOTE — ED PROVIDER NOTES
History     Chief Complaint   Patient presents with    Abdominal Pain     HPI  Michalea Dorman is a 73 year old female with history of hypertension, stage III CKD, urothelial carcinoma (s/p radical bladder cystectomy with ileal diversion/conduit), anemia, hypothyroidism, BPPV, hyperlipidemia, chronic pain syndrome/chronic back pain, EtOH abuse, subdural hematoma, GERD, and depression who presents for evaluation of left lower quadrant abdominal pain.  Symptoms started 1 week ago.  Gradually worsening and more persistent.  Denies fevers.  She has been feeling hot and cold.  Denies nausea or vomiting.  She had a small normal bowel movement this morning.  Denies black or bloody stool.  Non-smoker.  Drinks alcohol daily, 1 to 2 glasses of whiskey/    Colonoscopy 12/22/2017:  Impression:          - Diverticulosis in the sigmoid colon.                        - The examination was otherwise normal.                        - No specimens collected.   Recommendation:      - Discharge patient to home.                        - High fiber diet.                        - Repeat colonoscopy in 10 years for screening purposes.                        This presumes the absence of symptoms and having no                        close family members with colon cancer or polyps.                        - Return to referring physician.   PDMP Review         Value Time User    State PDMP site checked  Yes 10/29/2023  1:29 PM Janeen Altamirano APRN CNP            Allergies:  Allergies   Allergen Reactions    Oxybutynin      Patient reports symptoms of nausea and mind fogginess       Problem List:    Patient Active Problem List    Diagnosis Date Noted    Chronic fatigue syndrome 07/07/2023     Priority: Medium    Vision changes 07/07/2023     Priority: Medium    Balance problems 07/07/2023     Priority: Medium    Skin sensation disturbance 07/07/2023     Priority: Medium    Ileostomy status (H) 07/07/2023     Priority: Medium     Acquired hypothyroidism 07/07/2023     Priority: Medium    Hypothyroidism due to medication 07/07/2023     Priority: Medium    Pulmonary nodules 07/07/2023     Priority: Medium    F11.2 - Continuous opioid dependence (H) 07/07/2023     Priority: Medium    Status post ileal conduit (H) 09/12/2022     Priority: Medium    Status of artificial opening of urinary tract (H) 08/19/2022     Priority: Medium    Rheumatoid arthritis of multiple sites with negative rheumatoid factor (H) 02/25/2022     Priority: Medium    Parathyroid abnormality (H24) 02/25/2022     Priority: Medium    Hypomagnesemia 11/18/2021     Priority: Medium    Need for prophylactic vaccination and inoculation against influenza 11/18/2021     Priority: Medium    Constipation, unspecified constipation type 09/28/2021     Priority: Medium    Imaging abnormalities 09/28/2021     Priority: Medium    Anemia in neoplastic disease 05/21/2021     Priority: Medium    Chemotherapy-induced neutropenia  05/19/2021     Priority: Medium    Thrombocytopenia (H24) 05/19/2021     Priority: Medium    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) 04/07/2021     Priority: Medium     Check 11.21 for f/u Marty  Added automatically from request for surgery [id number]      Malignant neoplasm of overlapping sites of bladder (H) 03/08/2021     Priority: Medium    Personal history of malignant neoplasm of bladder 03/04/2021     Priority: Medium     Added automatically from request for surgery [id number]      Benign paroxysmal positional vertigo, bilateral 12/24/2020     Priority: Medium    Personal history of malignant neoplasm of breast 10/01/2020     Priority: Medium    Acute cystitis with hematuria 10/01/2020     Priority: Medium    Symptomatic stenosis of left carotid artery 09/22/2020     Priority: Medium     Added automatically from request for surgery [id number]      POSSIBLE Right internal carotid artery aneurysm 09/08/2020     Priority: Medium    Carotid stenosis,  bilateral L80%, R40% 09/02/2020     Priority: Medium    Carotid artery plaque, bilateral 09/02/2020     Priority: Medium    Anemia 07/16/2020     Priority: Medium    S/P lumbar fusion 02/04/2020     Priority: Medium    Bilateral impacted cerumen 01/13/2020     Priority: Medium    Stage 3a chronic kidney disease (H) 09/09/2019     Priority: Medium    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      Central stenosis of spinal canal 12/01/2017     Priority: Medium     lumbar        DDD (degenerative disc disease), lumbar 12/01/2017     Priority: Medium    Chronic pain syndrome 11/30/2017     Priority: Medium     substancePatient is followed by Phani Jason PA-C for ongoing prescription of pain medication.  All refills should only be approved by this provider, or covering partner.    Medication(s): Oxycodone IR 5mg.   Maximum quantity per month: 20  Clinic visit frequency required: Q 3 months     Controlled substance agreement:  Encounter-Level CSA:       There are no encounter-level csa.        Pain Clinic evaluation in the past: Yes       Date/Location:      DIRE Total Score(s):  No flowsheet data found.    Last [facility] website verification:  none   [email or web address]            Lumbar radiculopathy 11/30/2017     Priority: Medium    S/P reverse total shoulder arthroplasty, right 06/05/2017     Priority: Medium    Prophylactic antibiotic for dental procedure indicated due to prior joint replacement 06/05/2017     Priority: Medium    Hyperlipidemia LDL goal <130 05/30/2017     Priority: Medium    Anxiety 03/29/2017     Priority: Medium    S/P bilateral mastectomy 02/08/2017     Priority: Medium    Encounter for  antineoplastic chemotherapy 10/07/2016     Priority: Medium    Malignant neoplasm of upper-outer quadrant of left breast in female, estrogen receptor negative (H) 10/06/2016     Priority: Medium    Arthritis of shoulder region, right 11/17/2015     Priority: Medium    Adjustment disorder with depressed mood 11/11/2015     Priority: Medium    Baker's cyst of knee 02/09/2015     Priority: Medium    Patella-femoral syndrome 02/09/2015     Priority: Medium    Osteoarthrosis, hip 02/05/2013     Priority: Medium    Tinnitus of right ear 02/05/2013     Priority: Medium    Hypertension goal BP (blood pressure) < 140/90 10/04/2011     Priority: Medium    Health Care Home 09/29/2011     Priority: Medium     x  DX V65.8 REPLACED WITH 26070 HEALTH CARE HOME (04/08/2013)      Advanced directives, counseling/discussion 08/22/2011     Priority: Medium     Advance Directive Problem List Overview:   Name Relationship Phone    Primary Health Care Agent            Alternative Health Care Agent          Patient states has Advance Directive and will bring in a copy to clinic. 8/22/2011         Trochanteric bursitis 01/06/2009     Priority: Medium    Family history of stroke (cerebrovascular) 03/07/2008     Priority: Medium    Enthesopathy of hip region 01/30/2006     Priority: Medium        Past Medical History:    Past Medical History:   Diagnosis Date    Acute kidney injury (H24)     Carotid stenosis, bilateral L80%, R40% 09/02/2020    Central stenosis of spinal canal 12/01/2017    DDD (degenerative disc disease), lumbar 12/01/2017    Depressive disorder, not elsewhere classified     Esophageal reflux     ETOH abuse     Foraminal stenosis of lumbar region 12/01/2017    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    Subdural hematoma (H)      Thyroid disease     Urothelial carcinoma (H)        Past Surgical History:    Past Surgical History:   Procedure Laterality Date    ARTHROPLASTY SHOULDER Right 11/17/2015    ARTHROSCOPY KNEE  6/7/2012    Procedure:ARTHROSCOPY KNEE; right knee arthroscopy with partial lateral menisectomy; Surgeon:DAMIÁN MCALLISTER; Location:[facility]    BIOPSY VAGINAL N/A 10/27/2021    Procedure: DISTAL URETHRECTOMY;  Surgeon: Leonardo Robles MD;  Location: [facility] OR    COLONOSCOPY N/A 12/22/2017    Procedure: COLONOSCOPY;  colonoscopy;  Surgeon: Chuck Chand MD;  Location: [facility]    CYSTECTOMY BLADDER RADICAL, ILEAL DIVERSION, COMBINED N/A 6/1/2021    Procedure: RADICAL CYSTECTOMY  WITH ILEAL CONDUIT CREATION,BILATERAL PELVIC LYMPHADENECTOMY;  Surgeon: Leonardo Robles MD;  Location: [facility] OR    CYSTOSCOPY, RETROGRADES, COMBINED Bilateral 3/18/2021    Procedure: CYSTOSCOPY, WITH RETROGRADE PYELOGRAM;  Surgeon: Girish Jack MD;  Location: [facility]    CYSTOSCOPY, TRANSURETHRAL RESECTION (TUR) TUMOR BLADDER, COMBINED N/A 3/18/2021    Procedure: CYSTOSCOPY, WITH TRANSURETHRAL RESECTION BLADDER TUMOR;  Surgeon: Girish Jack MD;  Location: [facility]    ENDARTERECTOMY CAROTID Left 10/8/2020    Procedure: LEFT CAROTID ENDARTERECTOMY WITH EEG;  Surgeon: Lacho Jasmine MD;  Location: [facility]    ESOPHAGOSCOPY, GASTROSCOPY, DUODENOSCOPY (EGD), COMBINED N/A 6/20/2022    Procedure: ESOPHAGOGASTRODUODENOSCOPY, WITH BIOPSY;  Surgeon: Ramses Arenas MD;  Location: [facility]    EXCISE MASS AXILLA Left 9/16/2016    Procedure: EXCISE MASS AXILLA;  Surgeon: Keyon Balnco MD;  Location: [facility]    HC REMV CATARACT EXTRACAP,INSERT LENS, W/O ECP  6/25/2009    Right eye    INJECT EPIDURAL LUMBAR N/A 4/11/2019    Procedure: interlaminar epidual steroid injection lumbar 4-5;  Surgeon: Oskar Salvador MD;  [facility]    INJECT EPIDURAL LUMBAR Bilateral 9/12/2019    Procedure: lumbar 4-5 epidural interlaminar steroid injection;   Surgeon: Oskar Salvador MD;  [facility]    INSERT PORT VASCULAR ACCESS Right 10/7/2016    Procedure: INSERT PORT VASCULAR ACCESS;  Surgeon: Keyon Blanco MD;  [facility]: [facility]    IR CHEST PORT PLACEMENT > 5 YRS OF AGE  2021    MASTECTOMY SIMPLE BILATERAL Bilateral 2017    Procedure: MASTECTOMY SIMPLE BILATERAL;  Surgeon: Keyon Blanco MD;  [facility]: [facility]    OPEN REDUCTION INTERNAL FIXATION FOOT Right 3/20/2015    Procedure: OPEN REDUCTION INTERNAL FIXATION FOOT;  Surgeon: Javi Herrmann DPM;  [facility]    OPTICAL TRACKING SYSTEM FUSION SPINE POSTERIOR LUMBAR TWO LEVELS N/A 2020    Procedure: LUMBAR 2-SACRAL1 TRANSFORMINAL INTERBODY FUSION;  Surgeon: Lenny Gomes MD;  Location: [facility]    REMOVE PORT VASCULAR ACCESS Right 2018    Procedure: REMOVE PORT VASCULAR ACCESS;  right intra jugular port removal;  Surgeon: Keyon Blanco MD;  [facility]    SHOULDER SURGERY Right 2015    ZZC LIGATE FALLOPIAN TUBE      ZZC NONSPECIFIC PROCEDURE      ankle fracture surgery       Family History:    Family History   Problem Relation Age of Onset    Hypertension Mother     Thyroid Disease Mother     Cerebrovascular Disease Mother     Hypertension Father     Cerebrovascular Disease Father     Cancer Father         skin    Thyroid Disease Sister     Diabetes Brother         type 2    Depression Sister     Breast Cancer Sister         age 47    Cancer Sister         skin    Cancer Brother         inside nose       Social History:  Marital Status:   [2]  Social History     Tobacco Use    Smoking status: Former     Packs/day: 3.00     Years: 10.00     Additional pack years: 0.00     Total pack years: 30.00     Types: Cigarettes     Quit date: 1979     Years since quittin.9    Smokeless tobacco: Never    Tobacco comments:     approx. 3 packs daily   Vaping Use    Vaping Use: Never used   Substance Use Topics    Alcohol use: Not Currently     Comment: Stopped  "drinking a few weeks ago (july 2023)    Drug use: No        Medications:    acetaminophen (TYLENOL) 500 MG tablet  ALPRAZolam (XANAX) 0.5 MG tablet  aspirin (ASA) 81 MG chewable tablet  carvedilol (COREG) 6.25 MG tablet  Cyanocobalamin (B-12 PO)  escitalopram (LEXAPRO) 20 MG tablet  gabapentin (NEURONTIN) 300 MG capsule  levothyroxine (SYNTHROID/LEVOTHROID) 25 MCG tablet  LORazepam (ATIVAN) 0.5 MG tablet  meclizine (ANTIVERT) 25 MG tablet  omeprazole (PRILOSEC) 20 MG DR capsule  oxyCODONE (ROXICODONE) 5 MG tablet  oxyCODONE-acetaminophen (PERCOCET) 5-325 MG tablet  prochlorperazine (COMPAZINE) 10 MG tablet  STOOL SOFTENER/LAXATIVE 50-8.6 MG tablet  gabapentin 8 % in PLO cream          Review of Systems  As mentioned above in the history present illness. All other systems were reviewed and are negative.    Physical Exam   BP: 125/78  Pulse: 81  Temp: 98.2  F (36.8  C)  Resp: 19  Height: 167.6 cm (5' 6\")  Weight: 61.2 kg (135 lb)  SpO2: 100 %      Physical Exam  Constitutional:       General: She is not in acute distress.     Appearance: She is not ill-appearing.   HENT:      Head: Normocephalic and atraumatic.      Right Ear: External ear normal.      Left Ear: External ear normal.      Nose: Nose normal.      Mouth/Throat:      Mouth: Mucous membranes are moist.   Eyes:      Conjunctiva/sclera: Conjunctivae normal.   Cardiovascular:      Rate and Rhythm: Normal rate and regular rhythm.      Heart sounds: Normal heart sounds. No murmur heard.  Pulmonary:      Effort: Pulmonary effort is normal. No respiratory distress.      Breath sounds: Normal breath sounds.   Abdominal:      General: Bowel sounds are normal. There is no distension.      Palpations: Abdomen is soft.      Tenderness: There is abdominal tenderness in the left lower quadrant.   Musculoskeletal:         General: Normal range of motion.   Skin:     General: Skin is warm and dry.      Findings: No rash.   Neurological:      General: No focal deficit " present.      Mental Status: She is alert and oriented to person, place, and time.         ED Course              ED Course as of 10/29/23 1446   Sun Oct 29, 2023   1205 At bedside. Updated on labs so far. She just got IV Dilaudid. Awaiting to go to CT.     Procedures              Results for orders placed or performed during the hospital encounter of 10/29/23 (from the past 24 hour(s))   CBC with platelets differential    Narrative    The following orders were created for panel order CBC with platelets differential.  Procedure                               Abnormality         Status                     ---------                               -----------         ------                     CBC with platelets and d...[[id number]]  Abnormal            Final result                 Please view results for these tests on the individual orders.   Comprehensive metabolic panel   Result Value Ref Range    Sodium 135 135 - 145 mmol/L    Potassium 4.4 3.4 - 5.3 mmol/L    Carbon Dioxide (CO2) 21 (L) 22 - 29 mmol/L    Anion Gap 15 7 - 15 mmol/L    Urea Nitrogen 20.2 8.0 - 23.0 mg/dL    Creatinine 0.92 0.51 - 0.95 mg/dL    GFR Estimate 65 >60 mL/min/1.73m2    Calcium 9.7 8.8 - 10.2 mg/dL    Chloride 99 98 - 107 mmol/L    Glucose 95 70 - 99 mg/dL    Alkaline Phosphatase 146 (H) 35 - 104 U/L    AST 32 0 - 45 U/L    ALT 18 0 - 50 U/L    Protein Total 7.1 6.4 - 8.3 g/dL    Albumin 3.9 3.5 - 5.2 g/dL    Bilirubin Total 0.3 <=1.2 mg/dL   CBC with platelets and differential   Result Value Ref Range    WBC Count 11.2 (H) 4.0 - 11.0 10e3/uL    RBC Count 3.12 (L) 3.80 - 5.20 10e6/uL    Hemoglobin 10.4 (L) 11.7 - 15.7 g/dL    Hematocrit 32.2 (L) 35.0 - 47.0 %     (H) 78 - 100 fL    MCH 33.3 (H) 26.5 - 33.0 pg    MCHC 32.3 31.5 - 36.5 g/dL    RDW 13.8 10.0 - 15.0 %    Platelet Count 332 150 - 450 10e3/uL    % Neutrophils 68 %    % Lymphocytes 17 %    % Monocytes 12 %    % Eosinophils 3 %    % Basophils 0 %    % Immature Granulocytes 0  %    NRBCs per 100 WBC 0 <1 /100    Absolute Neutrophils 7.6 1.6 - 8.3 10e3/uL    Absolute Lymphocytes 1.9 0.8 - 5.3 10e3/uL    Absolute Monocytes 1.3 0.0 - 1.3 10e3/uL    Absolute Eosinophils 0.4 0.0 - 0.7 10e3/uL    Absolute Basophils 0.1 0.0 - 0.2 10e3/uL    Absolute Immature Granulocytes 0.0 <=0.4 10e3/uL    Absolute NRBCs 0.0 10e3/uL   CT Abdomen Pelvis w Contrast    Narrative    EXAM: CT ABDOMEN PELVIS W CONTRAST  LOCATION: [facility]  DATE: 10/29/2023    INDICATION: left lower quadrant abdominal pain  COMPARISON: 7/10/2023  TECHNIQUE: CT scan of the abdomen and pelvis was performed following injection of IV contrast. Multiplanar reformats were obtained. Dose reduction techniques were used.  CONTRAST: ISOVUE 370, 66 mL    FINDINGS:   LOWER CHEST: Partially imaged coronary artery calcifications.    HEPATOBILIARY: Normal.    PANCREAS: Normal.    SPLEEN: Normal.    ADRENAL GLANDS: Normal.    KIDNEYS/BLADDER: Cystectomy with right lower quadrant ileal conduit. Mild-moderate collecting system dilation, right greater than left. No obstructing urinary tract calculi.    BOWEL: Short segment wall thickening and fat stranding involving the proximal sigmoid colon. No upstream dilation, extraluminal gas, or fluid collection. No diverticulosis. Moderate amount of retained stool throughout the colon, including distally to the   area of thickening.    LYMPH NODES: Normal.    VASCULATURE: No abdominal aortic aneurysm.    PELVIC ORGANS: Hysterectomy. Unchanged 4.3 cm simple cystic lesion near the left adnexa since at least 9/21/2021, probably chronic seroma or adnexal cyst.    MUSCULOSKELETAL: No aggressive or destructive lesions. Surgical hardware in the lumbar spine.      Impression    IMPRESSION:   1.  Mild acute colitis involving the proximal sigmoid colon, likely infectious or inflammatory.    2.  Mild-moderate bilateral renal collecting system dilation, likely passive in the setting of  ileal conduit.   UA with Microscopic reflex to Culture    Specimen: Urine, Catheter   Result Value Ref Range    Color Urine Straw Colorless, Straw, Light Yellow, Yellow    Appearance Urine Clear Clear    Glucose Urine Negative Negative mg/dL    Bilirubin Urine Negative Negative    Ketones Urine Negative Negative mg/dL    Specific Gravity Urine 1.013 1.003 - 1.035    Blood Urine Negative Negative    pH Urine 6.0 5.0 - 7.0    Protein Albumin Urine Negative Negative mg/dL    Urobilinogen Urine Normal Normal, 2.0 mg/dL    Nitrite Urine Positive (A) Negative    Leukocyte Esterase Urine Trace (A) Negative    Bacteria Urine Few (A) None Seen /HPF    Mucus Urine Present (A) None Seen /LPF    RBC Urine <1 <=2 /HPF    WBC Urine 4 <=5 /HPF    Narrative    Urine Culture ordered based on laboratory criteria     *Note: Due to a large number of results and/or encounters for the requested time period, some results have not been displayed. A complete set of results can be found in Results Review.       Medications   sodium chloride (PF) 0.9% PF flush 10-20 mL (has no administration in time range)   sodium chloride (PF) 0.9% PF flush 10-20 mL (has no administration in time range)   sodium chloride (PF) 0.9% PF flush 10-20 mL (has no administration in time range)   heparin 100 unit/mL injection 5-10 mL (5 mLs Intracatheter $Given 10/29/23 1353)   sodium chloride 0.9% BOLUS 500 mL (0 mLs Intravenous Stopped 10/29/23 1342)   HYDROmorphone (PF) (DILAUDID) injection 0.5 mg (0.5 mg Intravenous $Given 10/29/23 1201)   iopamidol (ISOVUE-370) solution 500 mL (66 mLs Intravenous $Given 10/29/23 1221)   sodium chloride 0.9 % bag 100mL for CT scan flush use (60 mLs Intravenous $Given 10/29/23 1221)   HYDROmorphone (PF) (DILAUDID) injection 0.5 mg (0.5 mg Intravenous $Given 10/29/23 1334)       Assessments & Plan (with Medical Decision Making)           PDMP Review         Value Time User    State PDMP site checked  Yes 10/29/2023  1:29 PM Evelia  Janeen Bradley, APRN CNP            73 year old female with history of hypertension, stage III CKD, urothelial carcinoma (s/p radical bladder cystectomy with ileal diversion/conduit), anemia, hypothyroidism, BPPV, hyperlipidemia, chronic pain syndrome/chronic back pain, EtOH abuse, subdural hematoma, GERD, and depression who presents for evaluation of left lower quadrant abdominal pain.     Abdominal exam without peritoneal signs. No evidence of acute abdomen at this time. Well appearing.  Considered and doubt ovarian torsion given history and presentation. Given work up low suspicion for acute hepatobiliary disease (including acute cholecystitis), acute pancreatitis (neg lipase), PUD and gastric perforation, acute infectious processes (pneumonia, hepatitis, pyelonephritis), acute appendicitis, vascular catastrophe, bowel obstruction, viscus perforation, or diverticulitis. She has no fever. Normal vital signs.  Abdominal CT shows no evidence of diverticulitis. There is wall thickening of the proximal sigmoid colon, likely causing her pain. Consistent with a nonspecific colitis (infectious vs inflammatory). She has moderate amount of stool throughout the colon, which can also be contributing to her pain. Mild-moderate bilateral renal collecting dilation--- likely passive in the setting of ileal conduit. UA positive for nitrites, but no increased WBCs. Urine Culture pending.  I discussed treatment with patient for nonspecific colitis to include staying well-hydrated, pain control, and worrisome reasons to recheck.  We discussed opioid medications can cause increased constipation.  She already has moderate amount of stool throughout her colon.    Plan:  Urine culture pending, you will be contacted if positive for infection.    MiraLax 1 capful in 8-12 ounces of water daily for 3-5 days, you can stop if starting to have loose/watery stools.    Tylenol 650 mg every 4-6 hours as needed for pain.  Oxycodone 5 mg every 6  hours as needed for severe pain. Do not drive or drink alcohol on this medication.     Make appointment for recheck in clinic this week.    Return to the emergency department for fevers, worsening pain, vomiting, black or bloody stool, or any other symptoms of concern.        Discharge Medication List as of 10/29/2023  1:57 PM        START taking these medications    Details   oxyCODONE (ROXICODONE) 5 MG tablet Take 1 tablet (5 mg) by mouth every 6 hours as needed, Disp-12 tablet, R-0, E-Prescribe             Final diagnoses:   Non-specific colitis   Abdominal pain, left lower quadrant       10/29/2023   [facility] EMERGENCY DEPT       Evelia, LISA Jaeger CNP  10/29/23 3801

## 2023-10-29 NOTE — ED TRIAGE NOTES
LLQ pain for about 1 week and states it can go from cramping to burning type pain.  Was intermittent and now seems to be more constant and now feels some radiating pain to back.  Did have diarrhea a few days ago.

## 2023-10-29 NOTE — MEDICATION SCRIBE - ADMISSION MEDICATION HISTORY
"Medication Scribe Admission Medication History    Admission medication history is complete. The information provided in this note is only as accurate as the sources available at the time of the update.    Information Source(s): Patient via in-person    Pertinent Information:     Changes made to PTA medication list:  Added: None  Deleted: Methylprednisolone Dosepak - completed therapy   Prednisone 5 mg - no longer taking   Percocet 5-325 mg - current entry already exists   Changed: Meclizine 25 mg chew Q6 PRN changed to tablets; 1/2 tablet Q6 PRN as per patient's report   Oxycodone-Acetaminophen 5-325 mg changed from \"not taking\" to taking PRN as reported by patient     Medication Affordability:  Not including over the counter (OTC) medications, was there a time in the past 3 months when you did not take your medications as prescribed because of cost?: No    Allergies reviewed with patient and updates made in EHR: yes    Medication History Completed By: LOGAN SANFORD 10/29/2023 1:01 PM    PTA Med List   Medication Sig Note Last Dose    acetaminophen (TYLENOL) 500 MG tablet Take 1,000 mg by mouth 3 times daily as needed for mild pain (500MG X 2 = 1,000MG)  10/29/2023 at 0800    ALPRAZolam (XANAX) 0.5 MG tablet Take 0.5 mg by mouth 3 times daily as needed for anxiety or sleep  Past Week at hs    aspirin (ASA) 81 MG chewable tablet Take 1 tablet (81 mg) by mouth daily  10/29/2023 at am    carvedilol (COREG) 6.25 MG tablet TAKE 1 TABLET (6.25 MG) BY MOUTH 2 TIMES DAILY (WITH MEALS) PLEASE SEND NEXT REFILL TO PRIMARY CARE AS NEPHROLOGY FOLLOW UP \"AS NEEDED\" 10/29/2023: Usually misses second dose  10/29/2023 at am    Cyanocobalamin (B-12 PO) Take 1 tablet by mouth daily  10/29/2023 at am    escitalopram (LEXAPRO) 20 MG tablet Take 1 tablet (20 mg) by mouth daily  10/29/2023 at am    gabapentin (NEURONTIN) 300 MG capsule Take 2 capsules (600 mg) by mouth 3 times daily  10/29/2023 at am    levothyroxine (SYNTHROID/LEVOTHROID) " 25 MCG tablet TAKE 1 TABLET BY MOUTH EVERY DAY (Patient taking differently: Take 25 mcg by mouth daily)  10/29/2023 at am    LORazepam (ATIVAN) 0.5 MG tablet TAKE 1 TABLET (0.5 MG) BY MOUTH EVERY 4 HOURS AS NEEDED FOR ANXIETY, NAUSEA OR SLEEP  More than a month at on hand    meclizine (ANTIVERT) 25 MG tablet Take 12.5 mg by mouth every 6 hours as needed for dizziness 1/2 tablet  10/29/2023 at am    omeprazole (PRILOSEC) 20 MG DR capsule Take 20 mg by mouth daily  10/29/2023 at am    oxyCODONE-acetaminophen (PERCOCET) 5-325 MG tablet TAKE 1 TABLET BY MOUTH EVERY 6 HOURS AS NEEDED FORSEVERE PAIN (7-10) (Patient taking differently: Take 1-2 tablets by mouth every 6 hours as needed for severe pain)  10/29/2023 at am    prochlorperazine (COMPAZINE) 10 MG tablet TAKE 1/2 TABLET BY MOUTH EVERY 6 HOURS AS NEEDED FOR NAUSEA/VOMITING (Patient taking differently: Take 5 mg by mouth every 6 hours as needed for nausea or vomiting)  Past Month at on hand    STOOL SOFTENER/LAXATIVE 50-8.6 MG tablet TAKE 2 TABLETS BY MOUTH DAILY AT BEDTIME (Patient taking differently: Take 2 tablets by mouth at bedtime)  10/28/2023 at hs

## 2023-10-30 ENCOUNTER — MYC MEDICAL ADVICE (OUTPATIENT)
Dept: FAMILY MEDICINE | Facility: OTHER | Age: 73
End: 2023-10-30
Payer: COMMERCIAL

## 2023-10-30 LAB
BACTERIA UR CULT: ABNORMAL

## 2023-10-30 NOTE — TELEPHONE ENCOUNTER
Tried calling no answer.  [employer] sent to call for further triage.    Larry Meadows, MSN, APRN, PHN, FNP-C  [facility] ~ Registered Nurse  Clinic Triage ~ [place] [facility] [place]  October 30, 2023

## 2023-10-30 NOTE — TELEPHONE ENCOUNTER
Patient calling to stated she was seen in the ED yesterday for abdominal pain and diagnosed with colitis. Patient is still having constant LLQ 6/10 abdominal pain with taking the oxycodone. Patient denies worsening symptoms and denies blood in the stool. Patient is wondering if she could get some prednisone to help with the inflammation. Appointment with KINA Gayle, on 11/2.    Would you recommend patient to be evaluated in office/UC to get prednisone?

## 2023-10-31 ENCOUNTER — NURSE TRIAGE (OUTPATIENT)
Dept: ONCOLOGY | Facility: CLINIC | Age: 73
End: 2023-10-31
Payer: COMMERCIAL

## 2023-10-31 DIAGNOSIS — K52.9 COLITIS: Primary | ICD-10-CM

## 2023-10-31 RX ORDER — LORAZEPAM 0.5 MG/1
0.5 TABLET ORAL EVERY 4 HOURS PRN
Qty: 25 TABLET | Refills: 3 | Status: SHIPPED | OUTPATIENT
Start: 2023-10-31 | End: 2023-11-24

## 2023-10-31 RX ORDER — OXYCODONE AND ACETAMINOPHEN 5; 325 MG/1; MG/1
1 TABLET ORAL EVERY 6 HOURS PRN
Qty: 40 TABLET | Refills: 0 | Status: SHIPPED | OUTPATIENT
Start: 2023-10-31 | End: 2023-12-21

## 2023-10-31 RX ORDER — PREDNISONE 20 MG/1
TABLET ORAL
Qty: 20 TABLET | Refills: 0 | Status: SHIPPED | OUTPATIENT
Start: 2023-10-31 | End: 2024-01-28

## 2023-10-31 NOTE — TELEPHONE ENCOUNTER
Future Office Visit:   Next 5 appointments (look out 90 days)      Nov 02, 2023  3:30 PM  (Arrive by 3:10 PM)  Provider Visit with Phani Albright PA-C  [facility] ([facility] ) [address]  [facility] [place] [id number] [phone]             Requested Prescriptions   Pending Prescriptions Disp Refills    LORazepam (ATIVAN) 0.5 MG tablet [Pharmacy Med Name: LORAZEPAM 0.5MG TABLET] 25 tablet 3     Sig: TAKE 1 TABLET (0.5 MG) BY MOUTH EVERY 4 HOURS AS NEEDED FOR ANXIETY, NAUSEA OR SLEEP       There is no refill protocol information for this order       oxyCODONE-acetaminophen (PERCOCET) 5-325 MG tablet [Pharmacy Med Name: OXYCODONE/APAP 5MG-325MG TAB] 40 tablet 0     Sig: TAKE 1 TABLET BY MOUTH EVERY 6 HOURS AS NEEDED FOR SEVERE PAIN (7-10)       There is no refill protocol information for this order          Unable to fill per RN protocol, will forward to provider for review.       Carol Rollins RN on 10/31/2023 at 9:17 AM

## 2023-10-31 NOTE — TELEPHONE ENCOUNTER
Provider: Please advise when patient should be seen? Hospital follow up?     S: Colitis    B: Please also see MyChart from 10/30. Patient was evaluated in the ED on 10/29 and diagnosed with colitis with complaint of LLQ abdominal pain. Urine culture results are back and are seemingly contaminated. Patient states they took urine from her urostomy bag in the ED. Patient is now rating pain 5/10 constant. Patient denies blood in stool and vomiting. Patient is using Miralax as recommended by the ED. Patient states she is having bowel movements a couple times a day and her stool is formed.     A: Patient denies any fevers.     R: Per RN protocol, patient should be evaluated in UC or ED. Given patient was just seen in ED on 10/29 RN will route to provider to advise. Patient will await advice.       Reason for Disposition   MILD TO MODERATE constant pain lasting > 2 hours    Additional Information   Negative: Followed an abdomen (stomach) injury   Negative: Chest pain   Negative: Abdominal pain and pregnant < 20 weeks   Negative: Abdominal pain and pregnant 20 or more weeks   Negative: Pain is mainly in upper abdomen (if needed ask: 'is it mainly above the belly button?')   Negative: Abdomen bloating or swelling are main symptoms   Negative: Passed out (i.e., fainted, collapsed and was not responding)   Negative: Shock suspected (e.g., cold/pale/clammy skin, too weak to stand, low BP, rapid pulse)   Negative: Sounds like a life-threatening emergency to the triager   Negative: SEVERE abdominal pain (e.g., excruciating)   Negative: Vomiting red blood or black (coffee ground) material   Negative: Blood in bowel movements  (Exception: Blood on surface of BM with constipation.)   Negative: Black or tarry bowel movements  (Exception: Chronic-unchanged black-grey BMs AND is taking iron pills or Pepto-Bismol.)    Protocols used: Abdominal Pain - Female-A-[place]

## 2023-11-01 ENCOUNTER — MYC MEDICAL ADVICE (OUTPATIENT)
Dept: ONCOLOGY | Facility: CLINIC | Age: 73
End: 2023-11-01
Payer: COMMERCIAL

## 2023-11-01 NOTE — TELEPHONE ENCOUNTER
"Patient was seen in the emergency department 10/29/23 for abdominal pain. Per note from visit:     \"73 year old female with history of hypertension, stage III CKD, urothelial carcinoma (s/p radical bladder cystectomy with ileal diversion/conduit), anemia, hypothyroidism, BPPV, hyperlipidemia, chronic pain syndrome/chronic back pain, EtOH abuse, subdural hematoma, GERD, and depression who presents for evaluation of left lower quadrant abdominal pain.     Abdominal exam without peritoneal signs. No evidence of acute abdomen at this time. Well appearing.  Considered and doubt ovarian torsion given history and presentation. Given work up low suspicion for acute hepatobiliary disease (including acute cholecystitis), acute pancreatitis (neg lipase), PUD and gastric perforation, acute infectious processes (pneumonia, hepatitis, pyelonephritis), acute appendicitis, vascular catastrophe, bowel obstruction, viscus perforation, or diverticulitis. She has no fever. Normal vital signs.  Abdominal CT shows no evidence of diverticulitis. There is wall thickening of the proximal sigmoid colon, likely causing her pain. Consistent with a nonspecific colitis (infectious vs inflammatory). She has moderate amount of stool throughout the colon, which can also be contributing to her pain. Mild-moderate bilateral renal collecting dilation--- likely passive in the setting of ileal conduit. UA positive for nitrites, but no increased WBCs. Urine Culture pending.  I discussed treatment with patient for nonspecific colitis to include staying well-hydrated, pain control, and worrisome reasons to recheck.  We discussed opioid medications can cause increased constipation.  She already has moderate amount of stool throughout her colon.     Plan:  Urine culture pending, you will be contacted if positive for infection.     MiraLax 1 capful in 8-12 ounces of water daily for 3-5 days, you can stop if starting to have loose/watery stools.     Tylenol " "650 mg every 4-6 hours as needed for pain.  Oxycodone 5 mg every 6 hours as needed for severe pain. Do not drive or drink alcohol on this medication.      Make appointment for recheck in clinic this week.     Return to the emergency department for fevers, worsening pain, vomiting, black or bloody stool, or any other symptoms of concern.\"    Patient then reached out to Phani Oreilly PA-C, please see Mychart encounter from 10/30/23 and nurse triage encounter from 10/31/23. His recommendations were as follows:    \"If she does not have a fever and with continues to have a urinalysis I am comfortable with giving her some steroids to see we can do not about the colitis.  If this does not work she will need to be seen face-to-face for further evaluation and treatment options.  I will call in a prescription for prednisone.   Electronically signed:     Phani Jason PA-C \"    Please review and advise if there are any additional recommendations from an oncology perspective.     Noa Casiano RN   [facility]    "

## 2023-11-02 NOTE — NURSING NOTE
"Oncology Rooming Note    November 21, 2019 1:28 PM   Michaela Dorman is a 69 year old female who presents for:    Chief Complaint   Patient presents with     Oncology Clinic Visit     6 month follow up-Malignant neoplasm of upper-outer quadrant of left female breast, unspecified estrogen receptor status      Results     Initial Vitals: There were no vitals taken for this visit. Estimated body mass index is 22.6 kg/m  as calculated from the following:    Height as of 9/18/19: 1.676 m (5' 6\").    Weight as of 9/18/19: 63.5 kg (140 lb). There is no height or weight on file to calculate BSA.  Data Unavailable Comment: Data Unavailable   No LMP recorded. Patient is postmenopausal.  Allergies reviewed: Yes  Medications reviewed: Yes    Medications: Medication refills not needed today.  Pharmacy name entered into EPIC:    [facility] - [facility] [address] [place] [address] - [address]  [facility] [place], [place] - [address]  [facility] [place], [place] - [address] [facility] [place] [place] [facility] [address]     Clinical concerns: none.    5 minutes for nursing intake (face to face time)     Tami Arguello MA                "
DISCHARGE PLAN:  Next appointments: See patient instruction section  Departure Mode: Ambulatory  Accompanied by: self  3 minutes for nursing discharge (face to face time)     Michaela Dorman is here today for 6 month follow up for malignant neoplasm of upper-outer quadrant of left female breast.  Writing nurse saw patient after Medical Oncology appointment to address questions/concerns/coordinate care. Patient in 6 months with labs.  Patient ambulated by nurse to  to schedule follow up and/or lab appointments.  Imaging scheduled if ordered.  See patient instructions and Oncologist's Progress note for further details. Questions and concerns addressed to patient's satisfaction. Patient verbalized and demonstrated understanding of plan.  Contact information provided and patient is encouraged to call with any that arise in the interim of care.    Blanca Subramanian RN   Oncology Care Coordinator [facility]  [phone]  11/21/2019, 1:53 PM        
56.7

## 2023-11-03 NOTE — TELEPHONE ENCOUNTER
She has had multiple immunotherapy-related complications.    Agree w/ trial of steroids if not improving or worsening.    Girish Sauer MD.

## 2023-11-13 DIAGNOSIS — E03.4 HYPOTHYROIDISM DUE TO ACQUIRED ATROPHY OF THYROID: ICD-10-CM

## 2023-11-13 RX ORDER — LEVOTHYROXINE SODIUM 25 UG/1
TABLET ORAL
Qty: 90 TABLET | Refills: 1 | Status: SHIPPED | OUTPATIENT
Start: 2023-11-13 | End: 2024-06-07

## 2023-11-16 ENCOUNTER — MYC MEDICAL ADVICE (OUTPATIENT)
Dept: FAMILY MEDICINE | Facility: OTHER | Age: 73
End: 2023-11-16
Payer: COMMERCIAL

## 2023-11-16 ENCOUNTER — MYC MEDICAL ADVICE (OUTPATIENT)
Dept: ONCOLOGY | Facility: CLINIC | Age: 73
End: 2023-11-16
Payer: COMMERCIAL

## 2023-11-16 DIAGNOSIS — E78.5 HYPERLIPIDEMIA LDL GOAL <130: Primary | ICD-10-CM

## 2023-11-16 DIAGNOSIS — E03.4 HYPOTHYROIDISM DUE TO ACQUIRED ATROPHY OF THYROID: Primary | ICD-10-CM

## 2023-11-16 RX ORDER — METRONIDAZOLE 500 MG/1
1 TABLET ORAL
COMMUNITY
Start: 2023-11-02 | End: 2024-01-28

## 2023-11-16 RX ORDER — PREDNISONE 10 MG/1
TABLET ORAL
Qty: 9 TABLET | Refills: 0 | Status: SHIPPED | OUTPATIENT
Start: 2023-11-17 | End: 2023-12-15

## 2023-11-16 NOTE — TELEPHONE ENCOUNTER
Patient restarted her statin medication (not listed on med list) as she's worried about cholesterol being high. She is requesting recheck of cholesterol.    Last lipid panel was done on 9/12/22 and last office visit was 7/7/23.     Fasting Lipid panel pended. Please order if appropriate.

## 2023-11-18 DIAGNOSIS — E03.4 HYPOTHYROIDISM DUE TO ACQUIRED ATROPHY OF THYROID: ICD-10-CM

## 2023-11-20 DIAGNOSIS — C67.9 UROTHELIAL CARCINOMA OF BLADDER WITH INVASION OF MUSCLE (H): ICD-10-CM

## 2023-11-20 RX ORDER — LEVOTHYROXINE SODIUM 25 UG/1
TABLET ORAL
Qty: 90 TABLET | Refills: 1 | OUTPATIENT
Start: 2023-11-20

## 2023-11-24 RX ORDER — LORAZEPAM 0.5 MG/1
0.5 TABLET ORAL EVERY 4 HOURS PRN
Qty: 25 TABLET | Refills: 3 | Status: SHIPPED | OUTPATIENT
Start: 2023-11-24 | End: 2024-01-28

## 2023-11-24 NOTE — TELEPHONE ENCOUNTER
Future Office Visit:      Requested Prescriptions   Pending Prescriptions Disp Refills    LORazepam (ATIVAN) 0.5 MG tablet [Pharmacy Med Name: LORAZEPAM 0.5MG TABLET] 25 tablet 3     Sig: TAKE 1 TABLET (0.5 MG) BY MOUTH EVERY 4 HOURS AS NEEDED FOR ANXIETY, NAUSEA OR SLEEP       There is no refill protocol information for this order          Unable to fill per RN protocol, will forward to provider for review.         Carol Rollins RN on 11/24/2023 at 10:59 AM

## 2023-11-27 ENCOUNTER — HOSPITAL ENCOUNTER (OUTPATIENT)
Dept: CT IMAGING | Facility: CLINIC | Age: 73
Discharge: HOME | End: 2023-11-27
Attending: INTERNAL MEDICINE
Payer: MEDICARE

## 2023-11-27 ENCOUNTER — INFUSION THERAPY VISIT (OUTPATIENT)
Dept: INFUSION THERAPY | Facility: CLINIC | Age: 73
End: 2023-11-27
Attending: INTERNAL MEDICINE
Payer: MEDICARE

## 2023-11-27 DIAGNOSIS — C50.412 MALIGNANT NEOPLASM OF UPPER-OUTER QUADRANT OF LEFT BREAST IN FEMALE, ESTROGEN RECEPTOR NEGATIVE (H): Primary | ICD-10-CM

## 2023-11-27 DIAGNOSIS — Z17.1 MALIGNANT NEOPLASM OF UPPER-OUTER QUADRANT OF LEFT BREAST IN FEMALE, ESTROGEN RECEPTOR NEGATIVE (H): Primary | ICD-10-CM

## 2023-11-27 DIAGNOSIS — C67.9 UROTHELIAL CARCINOMA OF BLADDER WITH INVASION OF MUSCLE (H): ICD-10-CM

## 2023-11-27 DIAGNOSIS — C77.5 SECONDARY AND UNSPECIFIED MALIGNANT NEOPLASM OF INTRAPELVIC LYMPH NODES (H): ICD-10-CM

## 2023-11-27 DIAGNOSIS — E78.5 HYPERLIPIDEMIA LDL GOAL <130: ICD-10-CM

## 2023-11-27 LAB
ALBUMIN SERPL BCG-MCNC: 4.4 G/DL (ref 3.5–5.2)
ALP SERPL-CCNC: 65 U/L (ref 40–150)
ALT SERPL W P-5'-P-CCNC: 23 U/L (ref 0–50)
ANION GAP SERPL CALCULATED.3IONS-SCNC: 14 MMOL/L (ref 7–15)
AST SERPL W P-5'-P-CCNC: 24 U/L (ref 0–45)
BASOPHILS # BLD AUTO: 0 10E3/UL (ref 0–0.2)
BASOPHILS NFR BLD AUTO: 0 %
BILIRUB SERPL-MCNC: 0.2 MG/DL
BUN SERPL-MCNC: 27.3 MG/DL (ref 8–23)
CALCIUM SERPL-MCNC: 10.2 MG/DL (ref 8.8–10.2)
CHLORIDE SERPL-SCNC: 101 MMOL/L (ref 98–107)
CHOLEST SERPL-MCNC: 226 MG/DL
CREAT SERPL-MCNC: 0.97 MG/DL (ref 0.51–0.95)
DEPRECATED HCO3 PLAS-SCNC: 23 MMOL/L (ref 22–29)
EGFRCR SERPLBLD CKD-EPI 2021: 61 ML/MIN/1.73M2
EOSINOPHIL # BLD AUTO: 0.1 10E3/UL (ref 0–0.7)
EOSINOPHIL NFR BLD AUTO: 1 %
ERYTHROCYTE [DISTWIDTH] IN BLOOD BY AUTOMATED COUNT: 14.5 % (ref 10–15)
GLUCOSE SERPL-MCNC: 120 MG/DL (ref 70–99)
HCT VFR BLD AUTO: 34.1 % (ref 35–47)
HDLC SERPL-MCNC: 84 MG/DL
HGB BLD-MCNC: 11 G/DL (ref 11.7–15.7)
IMM GRANULOCYTES # BLD: 0 10E3/UL
IMM GRANULOCYTES NFR BLD: 0 %
LDLC SERPL CALC-MCNC: 107 MG/DL
LYMPHOCYTES # BLD AUTO: 1.3 10E3/UL (ref 0.8–5.3)
LYMPHOCYTES NFR BLD AUTO: 12 %
MCH RBC QN AUTO: 32.4 PG (ref 26.5–33)
MCHC RBC AUTO-ENTMCNC: 32.3 G/DL (ref 31.5–36.5)
MCV RBC AUTO: 101 FL (ref 78–100)
MONOCYTES # BLD AUTO: 0.2 10E3/UL (ref 0–1.3)
MONOCYTES NFR BLD AUTO: 2 %
NEUTROPHILS # BLD AUTO: 8.9 10E3/UL (ref 1.6–8.3)
NEUTROPHILS NFR BLD AUTO: 85 %
NONHDLC SERPL-MCNC: 142 MG/DL
NRBC # BLD AUTO: 0 10E3/UL
NRBC BLD AUTO-RTO: 0 /100
PLATELET # BLD AUTO: 294 10E3/UL (ref 150–450)
POTASSIUM SERPL-SCNC: 4.2 MMOL/L (ref 3.4–5.3)
PROT SERPL-MCNC: 7.3 G/DL (ref 6.4–8.3)
RBC # BLD AUTO: 3.39 10E6/UL (ref 3.8–5.2)
SODIUM SERPL-SCNC: 138 MMOL/L (ref 135–145)
TRIGL SERPL-MCNC: 176 MG/DL
TSH SERPL DL<=0.005 MIU/L-ACNC: 2.11 UIU/ML (ref 0.3–4.2)
WBC # BLD AUTO: 10.6 10E3/UL (ref 4–11)

## 2023-11-27 PROCEDURE — 36591 DRAW BLOOD OFF VENOUS DEVICE: CPT

## 2023-11-27 PROCEDURE — 84443 ASSAY THYROID STIM HORMONE: CPT

## 2023-11-27 PROCEDURE — 85041 AUTOMATED RBC COUNT: CPT

## 2023-11-27 PROCEDURE — 74177 CT ABD & PELVIS W/CONTRAST: CPT | Mod: MG

## 2023-11-27 PROCEDURE — 80061 LIPID PANEL: CPT

## 2023-11-27 PROCEDURE — 250N000009 HC RX 250: Performed by: INTERNAL MEDICINE

## 2023-11-27 PROCEDURE — G1010 CDSM STANSON: HCPCS

## 2023-11-27 PROCEDURE — 80053 COMPREHEN METABOLIC PANEL: CPT

## 2023-11-27 PROCEDURE — 250N000011 HC RX IP 250 OP 636: Mod: JZ | Performed by: INTERNAL MEDICINE

## 2023-11-27 PROCEDURE — 250N000011 HC RX IP 250 OP 636: Performed by: INTERNAL MEDICINE

## 2023-11-27 RX ORDER — IOPAMIDOL 755 MG/ML
500 INJECTION, SOLUTION INTRAVASCULAR ONCE
Status: COMPLETED | OUTPATIENT
Start: 2023-11-27 | End: 2023-11-27

## 2023-11-27 RX ORDER — HEPARIN SODIUM (PORCINE) LOCK FLUSH IV SOLN 100 UNIT/ML 100 UNIT/ML
5 SOLUTION INTRAVENOUS
Status: DISCONTINUED | OUTPATIENT
Start: 2023-11-27 | End: 2023-11-27 | Stop reason: HOSPADM

## 2023-11-27 RX ORDER — HEPARIN SODIUM (PORCINE) LOCK FLUSH IV SOLN 100 UNIT/ML 100 UNIT/ML
5 SOLUTION INTRAVENOUS
Status: CANCELLED | OUTPATIENT
Start: 2023-11-27

## 2023-11-27 RX ADMIN — SODIUM CHLORIDE 60 ML: 9 INJECTION, SOLUTION INTRAVENOUS at 14:00

## 2023-11-27 RX ADMIN — IOPAMIDOL 66 ML: 755 INJECTION, SOLUTION INTRAVENOUS at 13:59

## 2023-11-27 RX ADMIN — HEPARIN 5 ML: 100 SYRINGE at 14:12

## 2023-11-27 NOTE — PROGRESS NOTES
Infusion Nursing Note:  Michaela Dorman presents today for Power port access and labs.    Patient seen by provider today: No   present during visit today: Not Applicable.    Note: Patient ambulated to IVO. No complaints. .      Intravenous Access:  Labs drawn without difficulty.  Implanted Port.    Treatment Conditions:  Not Applicable.      Post Infusion Assessment:  Site patent and intact, free from redness, edema or discomfort.  Access discontinued per protocol.       Discharge Plan:   Patient discharged in stable condition accompanied by: self.  Departure Mode: Ambulatory.      Tami Knowles RN

## 2023-11-28 ENCOUNTER — MYC MEDICAL ADVICE (OUTPATIENT)
Dept: ONCOLOGY | Facility: CLINIC | Age: 73
End: 2023-11-28

## 2023-11-28 ENCOUNTER — TELEPHONE (OUTPATIENT)
Dept: UROLOGY | Facility: CLINIC | Age: 73
End: 2023-11-28

## 2023-11-28 ENCOUNTER — VIRTUAL VISIT (OUTPATIENT)
Dept: UROLOGY | Facility: CLINIC | Age: 73
End: 2023-11-28
Payer: COMMERCIAL

## 2023-11-28 VITALS — WEIGHT: 135 LBS | BODY MASS INDEX: 21.79 KG/M2

## 2023-11-28 DIAGNOSIS — C67.9 UROTHELIAL CARCINOMA OF BLADDER WITH INVASION OF MUSCLE (H): Primary | ICD-10-CM

## 2023-11-28 PROCEDURE — 99213 OFFICE O/P EST LOW 20 MIN: CPT | Mod: VID | Performed by: UROLOGY

## 2023-11-28 NOTE — NURSING NOTE
Is the patient currently in the state of [place]? YES    Visit mode:VIDEO    If the visit is dropped, the patient can be reconnected by: VIDEO VISIT: Text to cell phone:   Telephone Information:   Mobile [phone]       Will anyone else be joining the visit? NO  (If patient encounters technical issues they should call [phone]    How would you like to obtain your AVS? MyChart    Are changes needed to the allergy or medication list? No    Reason for visit: RECHECK and Video Visit    Blanca OATES

## 2023-11-28 NOTE — Clinical Note
11/28/2023       RE: Michaela Dorman  [address]  [facility] [address]     Dear Colleague,    Thank you for referring your patient, Michaela Dorman, to the [facility] [place] at [facility]. Please see a copy of my visit note below.    Michaela Dorman is a 73 year old female who is being evaluated via a billable video visit.      How would you like to obtain your AVS? MyChart  If the video visit is dropped, the invitation should be resent by: Text to cell phone: [phone]  Will anyone else be joining your video visit? No    Video Start Time: 1:49 PM    CHIEF COMPLAINT   It was my pleasure to see Michaela Dorman who is a 73 year old female for follow-up of bladder cancer.      HPI  Michaela Dorman is a very pleasant 73 year old female who presents with a history of bladder cancer. She underwent cystectomy with ileal conduit 6/1/2021. Stage [id number] disease.     Had positive margin at urethral margin noted on initial path, but has now had remnant urethra removed and proximal margin resected, with no residual disease. Recovering well from this.     Cr 0.97  Cytology negative     CT chest/abd/pelvis 11/27/2023  IMPRESSION:  1.  Proximal sigmoid colon thickening has resolved.  2.  Mild right hydronephrosis, decreased compared to 10/29/2023, is  likely passive related to the ileal conduit.  3.  Unchanged few low suspicion pulmonary nodules measuring up to 4 mm  dating back to at least 3/24/2021.     Urethrectomy 10/27/2021  Final Diagnosis   A.  Distal urethra, excision:  - Benign urethral and periurethral glands with mild chronic inflammation and fibrosis  - Negative for malignancy    B.  Distal urethra, proximal margins, excision:  - Benign muscle and connective tissue with mild fibrosis  - Negative for malignancy       Cystectomy with ileal conduit 6/1/2021  FINAL DIAGNOSIS:   C. Bladder, anterior vaginal wall, uterus, bilateral  "fallopian tubes,   ovaries and cervix:   - Invasive high grade urothelial carcinoma   - Carcinoma invades deep muscularis propria   - Prior therapy related changes   - Urothelial carcinoma in-situ is present in the periurethral ducts at   urethral margin   - Pathologic stage: [id number]   - Benign uterus, cervix, vaginal wall, ovaries and fallopian tubes with   physiological changes   - See synoptic report          Allergies:   Oxybutynin         Review of Systems:  From intake questionnaire     Skin: negative  Eyes: negative  Ears/Nose/Throat: negative  Respiratory: No shortness of breath, dyspnea on exertion, cough, or hemoptysis  Cardiovascular: No chest pain or palpitations  Gastrointestinal: negative; no nausea/vomiting, constipation or diarrhea  Genitourinary: as per HPI  Musculoskeletal: negative  Neurologic: negative  Psychiatric: negative  Hematologic/Lymphatic/Immunologic: negative  Endocrine: negative         Physical Exam:     Vitals:  No vitals were obtained today due to virtual visit.    Physical Exam   {video visit exam brief selected:[id number]::\"GENERAL: Healthy, alert and no distress\",\"EYES: Eyes grossly normal to inspection.  No discharge or erythema, or obvious scleral/conjunctival abnormalities.\",\"RESP: No audible wheeze, cough, or visible cyanosis.  No visible retractions or increased work of breathing.  \",\"SKIN: Visible skin clear. No significant rash, abnormal pigmentation or lesions.\",\"NEURO: Cranial nerves grossly intact.  Mentation and speech appropriate for age.\",\"PSYCH: Mentation appears normal, affect normal/bright, judgement and insight intact, normal speech and appearance well-groomed.\"}      Outside and Past Medical records:    Assessment requiring an independent historian(s) - {:[id number]}  Review of prior external note(s) from - {note reviewed source:[id number]}  Review of the result(s) of each unique test - ***         Assessment and Plan:     73 year old female with stage ypT2N0 bladder cancer " s/p radical cystectomy and ileal conduit 6/1/2021. Had remnant urethra removed with no malignancy noted. No recurrence noted today. Also follows with medical oncology. Creat stable and no hydronephrosis. Overall doing well at this time. Will continue surveillance per medical oncology.     Plan:  - Follow-up 6 monthsto review CT as ordered per medical oncology team. BMP and cytology as well.       Orders  No orders of the defined types were placed in this encounter.        Video-Visit Details    Type of service:  Video Visit    Video End Time:2:00 PM    Originating Location (pt. Location): Home    Distant Location (provider location):  On-site    Platform used for Video Visit: [employer]    *** minutes spent on the date of the encounter including direct interaction with the patient, performing chart review, history and exam, documentation and further activities as noted above.    Leonardo Robles MD  Urology  [facility]      Michaela Dorman is a 73 year old female who is being evaluated via a billable video visit.      How would you like to obtain your AVS? MyChart  If the video visit is dropped, the invitation should be resent by: Text to cell phone: [phone]  Will anyone else be joining your video visit? No    Video Start Time: 1:49 PM    CHIEF COMPLAINT   It was my pleasure to see Michaela Dorman who is a 73 year old female for follow-up of bladder cancer.      HPI  Michaela Dorman is a very pleasant 73 year old female who presents with a history of bladder cancer. She underwent cystectomy with ileal conduit 6/1/2021. Stage [id number] disease.     Had positive margin at urethral margin noted on initial path, but has now had remnant urethra removed and proximal margin resected, with no residual disease.    She recently had sigmoid colitis and scans with this. She has some mild hydronephrosis on the right, but no obvious obstruction. No evidence of metastasis.     She continues to follow  with Dr. Sauer of oncology.      Cr 0.97  Cytology negative     CT chest/abd/pelvis 11/27/2023  IMPRESSION:  1.  Proximal sigmoid colon thickening has resolved.  2.  Mild right hydronephrosis, decreased compared to 10/29/2023, is  likely passive related to the ileal conduit.  3.  Unchanged few low suspicion pulmonary nodules measuring up to 4 mm  dating back to at least 3/24/2021.     Urethrectomy 10/27/2021  Final Diagnosis   A.  Distal urethra, excision:  - Benign urethral and periurethral glands with mild chronic inflammation and fibrosis  - Negative for malignancy    B.  Distal urethra, proximal margins, excision:  - Benign muscle and connective tissue with mild fibrosis  - Negative for malignancy       Cystectomy with ileal conduit 6/1/2021  FINAL DIAGNOSIS:   C. 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 in the periurethral ducts at   urethral margin   - Pathologic stage: [id number]   - Benign uterus, cervix, vaginal wall, ovaries and fallopian tubes with   physiological changes   - See synoptic report          Allergies:   Oxybutynin         Review of Systems:  From intake questionnaire     Skin: negative  Eyes: negative  Ears/Nose/Throat: negative  Respiratory: No shortness of breath, dyspnea on exertion, cough, or hemoptysis  Cardiovascular: No chest pain or palpitations  Gastrointestinal: negative; no nausea/vomiting, constipation or diarrhea  Genitourinary: as per HPI  Musculoskeletal: negative  Neurologic: negative  Psychiatric: negative  Hematologic/Lymphatic/Immunologic: negative  Endocrine: negative         Physical Exam:     Vitals:  No vitals were obtained today due to virtual visit.    Physical Exam   GENERAL: Healthy, alert and no distress  EYES: Eyes grossly normal to inspection.  No  discharge or erythema, or obvious scleral/conjunctival abnormalities.  RESP: No audible wheeze, cough, or visible cyanosis.  No visible retractions or increased work of breathing.    SKIN: Visible skin clear. No significant rash, abnormal pigmentation or lesions.  NEURO: Cranial nerves grossly intact.  Mentation and speech appropriate for age.  PSYCH: Mentation appears normal, affect normal/bright, judgement and insight intact, normal speech and appearance well-groomed.      Outside and Past Medical records:    Review of the result(s) of each unique test - CT, creat         Assessment and Plan:     73 year old female with stage ypT2N0 bladder cancer s/p radical cystectomy and ileal conduit 6/1/2021. Had remnant urethra removed with no malignancy noted. No recurrence noted today. Also follows with medical oncology. Mild hydronephrosis on recent CT, but in the context of colitis and creat stable. No indication for further workup at this time. Will continue surveillance per medical oncology. I will see her back in 6 months to review imaging and evaluate for further progression of hydronephrosis.      Plan:  - Follow-up 6 months to review CT as ordered per medical oncology team. BMP and cytology as well.     Video-Visit Details    Type of service:  Video Visit    Video End Time:2:00 PM    Originating Location (pt. Location): Home    Distant Location (provider location):  On-site    Platform used for Video Visit: [employer]    10 minutes spent on the date of the encounter including direct interaction with the patient, performing chart review, history and exam, documentation and further activities as noted above.    Leonardo Robles MD  Urology  [facility]        Again, thank you for allowing me to participate in the care of your patient.      Sincerely,    Leonardo Robles MD

## 2023-11-28 NOTE — PROGRESS NOTES
Michaela Dorman is a 73 year old female who is being evaluated via a billable video visit.      How would you like to obtain your AVS? [employer]  If the video visit is dropped, the invitation should be resent by: Text to cell phone: [phone]  Will anyone else be joining your video visit? No    Video Start Time: 1:49 PM    CHIEF COMPLAINT   It was my pleasure to see Michaela Dorman who is a 73 year old female for follow-up of bladder cancer.      HPI  Michaela Dorman is a very pleasant 73 year old female who presents with a history of bladder cancer. She underwent cystectomy with ileal conduit 6/1/2021. Stage [id number] disease.     Had positive margin at urethral margin noted on initial path, but has now had remnant urethra removed and proximal margin resected, with no residual disease.    She recently had sigmoid colitis and scans with this. She has some mild hydronephrosis on the right, but no obvious obstruction. No evidence of metastasis.     She continues to follow with Dr. Sauer of oncology.      Cr 0.97  Cytology negative     CT chest/abd/pelvis 11/27/2023  IMPRESSION:  1.  Proximal sigmoid colon thickening has resolved.  2.  Mild right hydronephrosis, decreased compared to 10/29/2023, is  likely passive related to the ileal conduit.  3.  Unchanged few low suspicion pulmonary nodules measuring up to 4 mm  dating back to at least 3/24/2021.     Urethrectomy 10/27/2021  Final Diagnosis   A.  Distal urethra, excision:  - Benign urethral and periurethral glands with mild chronic inflammation and fibrosis  - Negative for malignancy    B.  Distal urethra, proximal margins, excision:  - Benign muscle and connective tissue with mild fibrosis  - Negative for malignancy       Cystectomy with ileal conduit 6/1/2021  FINAL DIAGNOSIS:   C. Bladder, anterior vaginal wall, uterus, bilateral fallopian tubes,   ovaries and cervix:   - Invasive high grade urothelial carcinoma   - Carcinoma invades deep muscularis  propria   - Prior therapy related changes   - Urothelial carcinoma in-situ is present in the periurethral ducts at   urethral margin   - Pathologic stage: [id number]   - Benign uterus, cervix, vaginal wall, ovaries and fallopian tubes with   physiological changes   - See synoptic report          Allergies:   Oxybutynin         Review of Systems:  From intake questionnaire     Skin: negative  Eyes: negative  Ears/Nose/Throat: negative  Respiratory: No shortness of breath, dyspnea on exertion, cough, or hemoptysis  Cardiovascular: No chest pain or palpitations  Gastrointestinal: negative; no nausea/vomiting, constipation or diarrhea  Genitourinary: as per HPI  Musculoskeletal: negative  Neurologic: negative  Psychiatric: negative  Hematologic/Lymphatic/Immunologic: negative  Endocrine: negative         Physical Exam:     Vitals:  No vitals were obtained today due to virtual visit.    Physical Exam   GENERAL: Healthy, alert and no distress  EYES: Eyes grossly normal to inspection.  No discharge or erythema, or obvious scleral/conjunctival abnormalities.  RESP: No audible wheeze, cough, or visible cyanosis.  No visible retractions or increased work of breathing.    SKIN: Visible skin clear. No significant rash, abnormal pigmentation or lesions.  NEURO: Cranial nerves grossly intact.  Mentation and speech appropriate for age.  PSYCH: Mentation appears normal, affect normal/bright, judgement and insight intact, normal speech and appearance well-groomed.      Outside and Past Medical records:    Review of the result(s) of each unique test - CT, creat         Assessment and Plan:     73 year old female with stage ypT2N0 bladder cancer s/p radical cystectomy and ileal conduit 6/1/2021. Had remnant urethra removed with no malignancy noted. No recurrence noted today. Also follows with medical oncology. Mild hydronephrosis on recent CT, but in the context of colitis and creat stable. No indication for further workup at this time.  Will continue surveillance per medical oncology. I will see her back in 6 months to review imaging and evaluate for further progression of hydronephrosis.      Plan:  - Follow-up 6 months to review CT as ordered per medical oncology team. BMP and cytology as well.     Video-Visit Details    Type of service:  Video Visit    Video End Time:2:00 PM    Originating Location (pt. Location): Home    Distant Location (provider location):  On-site    Platform used for Video Visit: [employer]    10 minutes spent on the date of the encounter including direct interaction with the patient, performing chart review, history and exam, documentation and further activities as noted above.    Leonardo Robles MD  Urology  [facility]

## 2023-11-30 ENCOUNTER — OFFICE VISIT (OUTPATIENT)
Dept: CARDIOLOGY | Facility: CLINIC | Age: 73
End: 2023-11-30
Payer: COMMERCIAL

## 2023-11-30 VITALS
WEIGHT: 145 LBS | HEART RATE: 71 BPM | OXYGEN SATURATION: 96 % | DIASTOLIC BLOOD PRESSURE: 78 MMHG | SYSTOLIC BLOOD PRESSURE: 139 MMHG | HEIGHT: 66 IN | BODY MASS INDEX: 23.3 KG/M2

## 2023-11-30 DIAGNOSIS — I25.10 CORONARY ARTERY CALCIFICATION: Primary | ICD-10-CM

## 2023-11-30 PROCEDURE — 99203 OFFICE O/P NEW LOW 30 MIN: CPT | Performed by: INTERNAL MEDICINE

## 2023-11-30 RX ORDER — ATORVASTATIN CALCIUM 40 MG/1
40 TABLET, FILM COATED ORAL DAILY
Qty: 90 TABLET | Refills: 4 | Status: SHIPPED | OUTPATIENT
Start: 2023-11-30 | End: 2024-03-21

## 2023-11-30 NOTE — PATIENT INSTRUCTIONS
Take your medicines every day, as directed     Changes made today:  Start Lipitor 40 mg daily.        Cardiology Care Coordinators:      Pebbles FONTANEZ RN     Cardiology Rooming staff:  Radha SMITH CMA    Phone  [phone]      Fax [phone]    To Contact us     During Business Hours:  [phone]     If you are needing refills please contact your pharmacy.     For urgent after hour care please call the [facility] at [phone] or the [facility] at [phone] and ask to speak to the cardiologist on call.            HOW TO CHECK YOUR BLOOD PRESSURE AT HOME:     Avoid eating, smoking, and exercising for at least 30 minutes before taking a reading.     Be sure you have taken your BP medication at least 2-3 hours before you check it.      Sit quietly for 10 minutes before a reading.      Sit in a chair with your feet flat on the floor. Rest your  arm on a table so that the arm cuff is at the same level as your heart.     Remain still during the reading.  Record your blood pressure and pulse in a log and bring to your next appointment.       [employer] allows you to communicate directly with your heart team through secure messaging.  [employer] can be accessed any time on your phone, computer, or tablet.  If you need assistance, we'd be happy to help!             Keep your Heart Appointments:     Follow-up with labs in 3 months to assess Lipids.

## 2023-11-30 NOTE — NURSING NOTE
Labs: Patient was given results of the laboratory testing obtained today. Patient was instructed to return for the next laboratory testing in Follow-up with labs in 3 months to assess Lipids.  . Patient demonstrated understanding of this information and agreed to call with further questions or concerns.     New Medication:   Start Lipitor 40 mg daily.   Patient was educated regarding newly prescribed medication, including discussion of  the indication, administration, side effects, and when to report to MD or RN. Patient demonstrated understanding of this information and agreed to call with further questions or concerns.     Virgen Brown RN

## 2023-11-30 NOTE — PROGRESS NOTES
Cardiology Clinic Note    HPI    Dear colleagues,     I had the pleasure of seeing Ms. Michaela Dorman in the Cardiology clinic.  As you know, Ms. Michaela Dorman is a pleasant 73 year old female with a past medical history of breast cancer that required radiation and mastectomy.  I am seeing as a new consultation for coronary artery calcification seen on CT scan.  I first met the patient in November 30, 2023.    Patient presents with her daughter.  She feels quite well.  She goes to the gym 5 days a week.  She lifts weights, exercises, goes to the pool.  She does not have any cardiopulmonary symptoms at all.    She had a surveillance CT scan of her chest abdomen pelvis due to her history of breast and bladder cancer.  She did get radiation to her left breast approximately 10 years ago.  Her CT scan most recently said severe coronary artery calcifications which prompted this referral.    PAST MEDICAL HISTORY:  Patient Active Problem List   Diagnosis    Enthesopathy of hip region    Family history of stroke (cerebrovascular)    Trochanteric bursitis    Advanced directives, counseling/discussion    Health Care Home    Hypertension goal BP (blood pressure) < 140/90    Baker's cyst of knee    Patella-femoral syndrome    Adjustment disorder with depressed mood    Malignant neoplasm of upper-outer quadrant of left breast in female, estrogen receptor negative (H)    Encounter for antineoplastic chemotherapy    S/P bilateral mastectomy    Anxiety    Hyperlipidemia LDL goal <130    S/P reverse total shoulder arthroplasty, right    Prophylactic antibiotic for dental procedure indicated due to prior joint replacement    Chronic pain syndrome    Lumbar radiculopathy    Foraminal stenosis of lumbar region    Central stenosis of spinal canal    DDD (degenerative disc disease), lumbar    Stage 3a chronic kidney disease (H)    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)    Magallanes's neuroma of  right foot    Bilateral impacted cerumen    S/P lumbar fusion    Anemia    Carotid stenosis, bilateral L80%, R40%    Carotid artery plaque, bilateral    POSSIBLE Right internal carotid artery aneurysm    Symptomatic stenosis of left carotid artery    Personal history of malignant neoplasm of breast    Acute cystitis with hematuria    Benign paroxysmal positional vertigo, bilateral    Personal history of malignant neoplasm of bladder    Malignant neoplasm of overlapping sites of bladder (H)    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)    Chemotherapy-induced neutropenia     Thrombocytopenia (H24)    Anemia in neoplastic disease    Constipation, unspecified constipation type    Imaging abnormalities    Hypomagnesemia    Need for prophylactic vaccination and inoculation against influenza    Rheumatoid arthritis of multiple sites with negative rheumatoid factor (H)    Parathyroid abnormality (H24)    Arthritis of shoulder region, right    Osteoarthrosis, hip    Tinnitus of right ear    Status of artificial opening of urinary tract (H)    Status post ileal conduit (H)    Chronic fatigue syndrome    Vision changes    Balance problems    Skin sensation disturbance    Ileostomy status (H)    Acquired hypothyroidism    Hypothyroidism due to medication    Pulmonary nodules    F11.2 - Continuous opioid dependence (H)        FAMILY HISTORY:  Family History   Problem Relation Age of Onset    Hypertension Mother     Thyroid Disease Mother     Cerebrovascular Disease Mother     Hypertension Father     Cerebrovascular Disease Father     Cancer Father         skin    Thyroid Disease Sister     Diabetes Brother         type 2    Depression Sister     Breast Cancer Sister         age 47    Cancer Sister         skin    Cancer Brother         inside nose       SOCIAL HISTORY:  Social History     Tobacco Use    Smoking status: Former     Packs/day: 3.00     Years: 10.00     Additional pack years: 0.00     Total pack  "years: 30.00     Types: Cigarettes     Quit date: 1979     Years since quittin.0    Smokeless tobacco: Never    Tobacco comments:     approx. 3 packs daily   Vaping Use    Vaping Use: Never used   Substance Use Topics    Alcohol use: Not Currently     Comment: Stopped drinking a few weeks ago (2023)    Drug use: No        CURRENT MEDICATIONS:  Current Outpatient Medications   Medication Sig Dispense Refill    acetaminophen (TYLENOL) 500 MG tablet Take 1,000 mg by mouth 3 times daily as needed for mild pain (500MG X 2 = 1,000MG)      ALPRAZolam (XANAX) 0.5 MG tablet Take 0.5 mg by mouth 3 times daily as needed for anxiety or sleep      aspirin (ASA) 81 MG chewable tablet Take 1 tablet (81 mg) by mouth daily 100 tablet 3    carvedilol (COREG) 6.25 MG tablet TAKE 1 TABLET (6.25 MG) BY MOUTH 2 TIMES DAILY (WITH MEALS) PLEASE SEND NEXT REFILL TO PRIMARY CARE AS NEPHROLOGY FOLLOW UP \"AS NEEDED\" 90 tablet 0    Cyanocobalamin (B-12 PO) Take 1 tablet by mouth daily      escitalopram (LEXAPRO) 20 MG tablet Take 1 tablet (20 mg) by mouth daily 90 tablet 3    gabapentin (NEURONTIN) 300 MG capsule Take 2 capsules (600 mg) by mouth 3 times daily 180 capsule 5    gabapentin 8 % in PLO cream Apply 1-2 clicks (0.25-0.5 g) topically every 8 hours as needed for pain 100 g 3    levothyroxine (SYNTHROID/LEVOTHROID) 25 MCG tablet TAKE 1 TABLET BY MOUTH EVERY DAY 90 tablet 1    LORazepam (ATIVAN) 0.5 MG tablet TAKE 1 TABLET (0.5 MG) BY MOUTH EVERY 4 HOURS AS NEEDED FOR ANXIETY, NAUSEA OR SLEEP 25 tablet 3    meclizine (ANTIVERT) 25 MG tablet Take 12.5 mg by mouth every 6 hours as needed for dizziness 1/2 tablet      metroNIDAZOLE (FLAGYL) 500 MG tablet Take 1 tablet by mouth 3 times daily      omeprazole (PRILOSEC) 20 MG DR capsule Take 20 mg by mouth daily      oxyCODONE (ROXICODONE) 5 MG tablet Take 1 tablet (5 mg) by mouth every 6 hours as needed 12 tablet 0    oxyCODONE-acetaminophen (PERCOCET) 5-325 MG tablet TAKE 1 " "TABLET BY MOUTH EVERY 6 HOURS AS NEEDED FOR SEVERE PAIN (7-10) 40 tablet 0    predniSONE (DELTASONE) 10 MG tablet Take 1 tablet (10 mg) by mouth three times a week for 14 days, THEN 0.5 tablets (5 mg) three times a week for 14 days. 9 tablet 0    predniSONE (DELTASONE) 20 MG tablet Take 3 tabs by mouth daily x 3 days, then 2 tabs daily x 3 days, then 1 tab daily x 3 days, then 1/2 tab daily x 3 days. 20 tablet 0    prochlorperazine (COMPAZINE) 10 MG tablet TAKE 1/2 TABLET BY MOUTH EVERY 6 HOURS AS NEEDED FOR NAUSEA/VOMITING (Patient taking differently: Take 5 mg by mouth every 6 hours as needed for nausea or vomiting) 30 tablet 2    STOOL SOFTENER/LAXATIVE 50-8.6 MG tablet TAKE 2 TABLETS BY MOUTH DAILY AT BEDTIME (Patient taking differently: Take 2 tablets by mouth at bedtime) 100 tablet 0       EXAM:  /78   Pulse 71   Ht 1.676 m (5' 6\")   Wt 65.8 kg (145 lb)   SpO2 96%   BMI 23.40 kg/m      General: appears comfortable, alert and articulate  Heart: regular, S1/S2, no murmur, estimated JVP 8  Lungs: clear, no rales or wheezing  Extremities: no edema  Neurological: normal speech and affect, no gross motor deficits    Weight  Wt Readings from Last 10 Encounters:   11/30/23 65.8 kg (145 lb)   11/28/23 61.2 kg (135 lb)   10/29/23 61.2 kg (135 lb)   10/26/23 61.2 kg (135 lb)   10/12/23 61.2 kg (135 lb)   10/10/23 61.2 kg (135 lb)   09/03/23 60.8 kg (134 lb)   08/23/23 61.2 kg (135 lb)   07/26/23 61.2 kg (135 lb)   07/13/23 61.2 kg (135 lb)       Testing/Procedures:  I personally visualized and interpreted:    Reviewed various CT chest scans from the last 2 years      Assessment and Plan:     In summary, very pleasant 73-year-old female with dyslipidemia and coronary artery calcification seen on last chest CT.    Radiation likely increased calcification seen.  Most recent scan shows extensive left anterior descending artery calcification, with scant to mild calcification in the other vascular territories.  She " does have calcifications along her thoracic and abdominal aorta.    Will start atorvastatin 40 mg daily.  Can get repeat lipid profile, nonfasting, in the next 3 months.    No need for any other intervention as she is asymptomatic.    No need for cardiology follow-up unless new change in clinical signs or symptoms.    The patient states understanding and is agreeable with plan.   Feel free to contact myself regarding questions or concerns.  It was a pleasure to see this patient today.    Osmin Craig MD   of Medicine  Advanced Heart Failure and Transplant Cardiology    CC  SELF, REFERRED    Today's clinic visit entailed:  30 minutes spent on the date of the encounter doing chart review, history and exam, documentation and further activities per the note    The level of medical decision making during this visit was of moderate complexity.

## 2023-11-30 NOTE — NURSING NOTE
"Chief Complaint   Patient presents with    New Patient       Initial /78   Pulse 71   Ht 1.676 m (5' 6\")   Wt 65.8 kg (145 lb)   SpO2 96%   BMI 23.40 kg/m   Estimated body mass index is 23.4 kg/m  as calculated from the following:    Height as of this encounter: 1.676 m (5' 6\").    Weight as of this encounter: 65.8 kg (145 lb)..  BP completed using cuff size: mone Suazo CMA (AAMA)    "

## 2023-12-04 ENCOUNTER — VIRTUAL VISIT (OUTPATIENT)
Dept: ONCOLOGY | Facility: CLINIC | Age: 73
End: 2023-12-04
Attending: INTERNAL MEDICINE
Payer: COMMERCIAL

## 2023-12-04 VITALS
SYSTOLIC BLOOD PRESSURE: 120 MMHG | WEIGHT: 135 LBS | HEIGHT: 66 IN | DIASTOLIC BLOOD PRESSURE: 70 MMHG | BODY MASS INDEX: 21.69 KG/M2

## 2023-12-04 DIAGNOSIS — T45.1X5A CHEMOTHERAPY-INDUCED NEUROPATHY (H): ICD-10-CM

## 2023-12-04 DIAGNOSIS — K52.9 COLITIS: ICD-10-CM

## 2023-12-04 DIAGNOSIS — G62.0 CHEMOTHERAPY-INDUCED NEUROPATHY (H): ICD-10-CM

## 2023-12-04 DIAGNOSIS — E03.4 HYPOTHYROIDISM DUE TO ACQUIRED ATROPHY OF THYROID: ICD-10-CM

## 2023-12-04 DIAGNOSIS — C67.9 UROTHELIAL CARCINOMA OF BLADDER WITH INVASION OF MUSCLE (H): Primary | ICD-10-CM

## 2023-12-04 PROCEDURE — 99214 OFFICE O/P EST MOD 30 MIN: CPT | Mod: VID | Performed by: INTERNAL MEDICINE

## 2023-12-04 ASSESSMENT — PAIN SCALES - GENERAL: PAINLEVEL: NO PAIN (0)

## 2023-12-04 NOTE — PROGRESS NOTES
"Virtual Visit Details    Type of service:  Video Visit   Video Start Time: {video visit start/end time for provider to select:585767}  Video End Time:{video visit start/end time for provider to select:172920}    Originating Location (pt. Location): {video visit patient location:207163::\"Home\"}  {PROVIDER LOCATION On-site should be selected for visits conducted from your clinic location or adjoining [facility] hospital, academic office, or other nearby [facility] building. Off-site should be selected for all other provider locations, including home:920038}  Distant Location (provider location):  {virtual location provider:886251}  Platform used for Video Visit: {Virtual Visit Platforms:379424::\"Zuli\"}  "

## 2023-12-04 NOTE — NURSING NOTE
Is the patient currently in the state of [place]? YES    Visit mode:VIDEO    If the visit is dropped, the patient can be reconnected by: VIDEO VISIT: Text to cell phone:   Telephone Information:   Mobile [phone]       Will anyone else be joining the visit? NO  (If patient encounters technical issues they should call [phone] :[phone])    How would you like to obtain your AVS? MyChart    Are changes needed to the allergy or medication list? Pt stated no med changes    Reason for visit: RECHECK    No other vitals to report per pt    Blanca LINNF

## 2023-12-06 ENCOUNTER — MYC MEDICAL ADVICE (OUTPATIENT)
Dept: ONCOLOGY | Facility: CLINIC | Age: 73
End: 2023-12-06
Payer: COMMERCIAL

## 2023-12-06 DIAGNOSIS — K52.9 COLITIS: Primary | ICD-10-CM

## 2023-12-08 NOTE — TELEPHONE ENCOUNTER
Start prednisone 20 mg x 1 week, 15 mg x 1 week, 10 mg x 1 week, 5 mg x 1 week, 2.5 mg x 1 week.    Verbal order.    Girish Sauer MD.

## 2023-12-08 NOTE — PROGRESS NOTES
[facility]  Progress Note Dec 4, 2023  Name: Michaela Dorman  :  1950    MRN:  [id number]    --------------------    Assessment / Plan:  Left-sided ER-, HER2+ breast CA:  # Sep 2016 Presented w/ left axillary mass; staging multicentric T4 lesion w/ geraldine involvement; biopsy w/ ER-, HER2+ breast cancer  # Sep 2016 - 2017 Neoadjuvant AC x 4 cycles/TH x 4 cycles.  # 2017 Bilateral mastectomy no with geraldine evaluation; complete path CR from invasive cancer; 7 mm DCIS residual.  # 2017 - May 2017 Adjuvant radiation to left chestwall / axilla.  # 2017 - Dec 2017 Adjuvant Herceptin.    Bladder cancer:([id number])  # Oct 2020 Presented w/ dysuria.  # 2021 Cytoscopy w/ muscle-invasive high grade urothelial cancer.  # Mar 2021 TURBT.  # 2021 Neoadjuvant cisplatin/gem split dose complicated by TAMIR.  # May 2021 Neoadjuvant carboplatin/gem.  # 2021 Radical cystectomy w/ ileal conduit; path high grade urothelial carcinoma muscle invasive; margins negs; nodes negative.  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    Continue observation; clinically and radiographically DAGOBERTO.  At continued risk for immunotoxicities; steroid-responsive.  Continue gabapentin for neuropathy, wean as able.  RTC 6 months w/ labs (CBC, CMP, TSH) and surveillance CT CAP.  Endoscopy and colonoscopy upcoming.  If recurrent toxicities, may need prolonged course of prednisone taper.    Girish Sauer MD    --------------------    Interval History:  Michaela returns for follow-up of bladder cancer and immunotoxicities.  She remains well.  Abdominal symptoms have resolved on prednisone w/o recurrence.  Planning endoscopy and colonoscopy.    Social History:  Social History     Tobacco Use    Smoking  status: Former     Packs/day: 3.00     Years: 10.00     Additional pack years: 0.00     Total pack years: 30.00     Types: Cigarettes     Quit date: 1979     Years since quittin.0     Passive exposure: Never    Smokeless tobacco: Never    Tobacco comments:     approx. 3 packs daily   Substance Use Topics    Alcohol use: Not Currently     Comment: Stopped drinking a few weeks ago (2023)     Family History:  Family History   Problem Relation Age of Onset    Hypertension Mother     Thyroid Disease Mother     Cerebrovascular Disease Mother     Hypertension Father     Cerebrovascular Disease Father     Cancer Father         skin    Thyroid Disease Sister     Diabetes Brother         type 2    Depression Sister     Breast Cancer Sister         age 47    Cancer Sister         skin    Cancer Brother         inside nose     Immunizations:  Immunization History   Administered Date(s) Administered    COVID-19 Monovalent 18+ (Moderna) 2021, 2021, 2021    Hepatitis B, Adult 1995    Influenza (High Dose) 3 valent vaccine 10/28/2014, 2020    Influenza (IIV3) PF 10/31/2005    Influenza Vaccine 65+ (Fluzone HD) 10/01/2020, 2021    Mantoux Tuberculin Skin Test 11/10/2006    Pneumo Conj 13-V (2010&after) 2016, 2018    TD,PF 7+ (Tenivac) 1990, 2005    TDAP (Adacel,Boostrix) 2022    TDAP Vaccine (Adacel) 2012    Tetanus 2005     Health Maintenance Due   Topic Date Due    ZOSTER IMMUNIZATION (1 of 2) Never done    HEPATITIS A IMMUNIZATION (1 of 2 - Risk 2-dose series) Never done    Pneumococcal Vaccine: 65+ Years (2 - PPSV23 or PCV20) 2018    INFLUENZA VACCINE (1) 2023    COVID-19 Vaccine (4 -  season) 2023     --------------------    Physical Exam:  Video visit.    Labs / Imaging:  Reviewed CBC, CMP, TSH.  Reviewed CT CAP w/ independent review.    Video Visit:  Michaela is a 73 year old female who is being evaluated via a  billable video visit.  }    Video start time:  3:38 PM  Video end time:  3:54 PM    Provider location: [facility]  Patient location: Home    Mode of transmission:  Portal / [employer]

## 2023-12-11 RX ORDER — PREDNISONE 20 MG/1
20 TABLET ORAL DAILY
Qty: 7 TABLET | Refills: 0 | Status: SHIPPED | OUTPATIENT
Start: 2023-12-11 | End: 2023-12-11

## 2023-12-11 RX ORDER — PREDNISONE 10 MG/1
10 TABLET ORAL DAILY
Qty: 7 TABLET | Refills: 0 | Status: SHIPPED | OUTPATIENT
Start: 2023-12-27 | End: 2023-12-11

## 2023-12-11 RX ORDER — PREDNISONE 5 MG/1
TABLET ORAL
Qty: 74 TABLET | Refills: 0 | Status: SHIPPED | OUTPATIENT
Start: 2023-12-19 | End: 2024-01-23

## 2023-12-11 RX ORDER — PREDNISONE 5 MG/1
2.5 TABLET ORAL DAILY
Qty: 4 TABLET | Refills: 0 | Status: SHIPPED | OUTPATIENT
Start: 2024-01-12 | End: 2023-12-11

## 2023-12-11 RX ORDER — PREDNISONE 5 MG/1
15 TABLET ORAL DAILY
Qty: 21 TABLET | Refills: 0 | Status: SHIPPED | OUTPATIENT
Start: 2023-12-19 | End: 2023-12-11

## 2023-12-11 RX ORDER — PREDNISONE 5 MG/1
5 TABLET ORAL DAILY
Qty: 7 TABLET | Refills: 0 | Status: SHIPPED | OUTPATIENT
Start: 2024-01-04 | End: 2023-12-11

## 2023-12-11 NOTE — TELEPHONE ENCOUNTER
Call received from [facility] pharmacy requesting clarification of prednisone instructions.  Contacted pharmacy, offered to send clarifying prescription to replace previously sent Rx's.      New Rx per provider order.     Katja Alfaro RN

## 2023-12-19 ENCOUNTER — TRANSFERRED RECORDS (OUTPATIENT)
Dept: HEALTH INFORMATION MANAGEMENT | Facility: CLINIC | Age: 73
End: 2023-12-19
Payer: COMMERCIAL

## 2023-12-20 ENCOUNTER — TELEPHONE (OUTPATIENT)
Dept: ONCOLOGY | Facility: CLINIC | Age: 73
End: 2023-12-20
Payer: COMMERCIAL

## 2023-12-20 DIAGNOSIS — G89.4 CHRONIC PAIN SYNDROME: ICD-10-CM

## 2023-12-20 NOTE — TELEPHONE ENCOUNTER
INCOMING FORMS    Sender: Handi    Type of Form, letter or note (What is requested?): order    How was the form received?: Fax    How should forms be returned?:  Fax : [phone]    Form placed in [facility] bin for review/signature if appropriate.

## 2023-12-21 ENCOUNTER — TRANSFERRED RECORDS (OUTPATIENT)
Dept: HEALTH INFORMATION MANAGEMENT | Facility: CLINIC | Age: 73
End: 2023-12-21
Payer: COMMERCIAL

## 2023-12-21 ENCOUNTER — MYC MEDICAL ADVICE (OUTPATIENT)
Dept: ONCOLOGY | Facility: CLINIC | Age: 73
End: 2023-12-21
Payer: COMMERCIAL

## 2023-12-21 ENCOUNTER — MEDICAL CORRESPONDENCE (OUTPATIENT)
Dept: HEALTH INFORMATION MANAGEMENT | Facility: CLINIC | Age: 73
End: 2023-12-21
Payer: COMMERCIAL

## 2023-12-21 RX ORDER — OXYCODONE AND ACETAMINOPHEN 5; 325 MG/1; MG/1
1 TABLET ORAL EVERY 6 HOURS PRN
Qty: 40 TABLET | Refills: 0 | Status: SHIPPED | OUTPATIENT
Start: 2023-12-21 | End: 2024-01-28

## 2023-12-21 NOTE — TELEPHONE ENCOUNTER
SUBJECTIVE/OBJECTIVE:                                                    Refill request receive for:  oxyCODONE-acetaminophen (PERCOCET) 5-325 MG tablet     Last refill: 10/31/2023  Date of LOV: 12/4/2023  Future appt scheduled? No     RECENT LABS/VITALS                                                      Recent Labs   Lab Test 11/27/23  1316 09/12/22  1555   CHOL 226* 206*   HDL 84 80   * 90   TRIG 176* 179*     Lab Results   Component Value Date    AST 24 11/27/2023    AST 20 05/21/2021     Lab Results   Component Value Date    ALT 23 11/27/2023    ALT 38 05/21/2021     Lab Results   Component Value Date    A1C 5.4 07/26/2023    A1C 5.3 09/30/2021    A1C 5.2 10/08/2020    A1C 5.0 01/24/2017     Creatinine   Date Value Ref Range Status   11/27/2023 0.97 (H) 0.51 - 0.95 mg/dL Final   06/24/2021 1.10 (H) 0.52 - 1.04 mg/dL Final   ]  Potassium   Date Value Ref Range Status   11/27/2023 4.2 3.4 - 5.3 mmol/L Final   11/28/2022 4.8 3.4 - 5.3 mmol/L Final   06/24/2021 4.7 3.4 - 5.3 mmol/L Final   ]  TSH   Date Value Ref Range Status   11/27/2023 2.11 0.30 - 4.20 uIU/mL Final   07/07/2023 4.04 0.30 - 4.20 uIU/mL Final   11/16/2022 3.08 0.40 - 4.00 mU/L Final   07/11/2022 2.46 0.40 - 4.00 mU/L Final   05/02/2022 1.83 0.40 - 4.00 mU/L Final   02/25/2022 8.71 (H) 0.40 - 4.00 mU/L Final   01/24/2017 1.34 0.40 - 4.00 mU/L Final   08/31/2016 0.39 (L) 0.40 - 4.00 mU/L Final   06/10/2013 0.49 0.4 - 5.0 mU/L Final   08/02/2010 0.81 0.4 - 5.0 mU/L Final     BP Readings from Last 4 Encounters:   12/04/23 120/70   11/30/23 139/78   10/29/23 (!) 141/78   10/12/23 118/68       PLAN:                                                      Sent to provider to advise.    Katja Alfaro RN

## 2023-12-22 NOTE — TELEPHONE ENCOUNTER
See second Mychart encounter for same day 12/21/22.    Carol Rollins RN on 12/22/2023 at 9:40 AM

## 2023-12-28 DIAGNOSIS — F43.21 ADJUSTMENT DISORDER WITH DEPRESSED MOOD: ICD-10-CM

## 2023-12-28 DIAGNOSIS — F41.9 ANXIETY: ICD-10-CM

## 2023-12-31 RX ORDER — ESCITALOPRAM OXALATE 20 MG/1
20 TABLET ORAL DAILY
Qty: 90 TABLET | Refills: 3 | OUTPATIENT
Start: 2023-12-31

## 2024-01-03 ENCOUNTER — VIRTUAL VISIT (OUTPATIENT)
Dept: FAMILY MEDICINE | Facility: CLINIC | Age: 74
End: 2024-01-03
Payer: COMMERCIAL

## 2024-01-03 ENCOUNTER — MYC MEDICAL ADVICE (OUTPATIENT)
Dept: ONCOLOGY | Facility: CLINIC | Age: 74
End: 2024-01-03

## 2024-01-03 DIAGNOSIS — F43.21 ADJUSTMENT DISORDER WITH DEPRESSED MOOD: ICD-10-CM

## 2024-01-03 DIAGNOSIS — T45.1X5A CHEMOTHERAPY-INDUCED NEUTROPENIA (H): ICD-10-CM

## 2024-01-03 DIAGNOSIS — N18.31 STAGE 3A CHRONIC KIDNEY DISEASE (H): ICD-10-CM

## 2024-01-03 DIAGNOSIS — C77.5 SECONDARY AND UNSPECIFIED MALIGNANT NEOPLASM OF INTRAPELVIC LYMPH NODES (H): ICD-10-CM

## 2024-01-03 DIAGNOSIS — U07.1 INFECTION DUE TO 2019 NOVEL CORONAVIRUS: Primary | ICD-10-CM

## 2024-01-03 DIAGNOSIS — D70.1 CHEMOTHERAPY-INDUCED NEUTROPENIA (H): ICD-10-CM

## 2024-01-03 DIAGNOSIS — E43 UNSPECIFIED SEVERE PROTEIN-CALORIE MALNUTRITION (H): ICD-10-CM

## 2024-01-03 DIAGNOSIS — F11.20 CONTINUOUS OPIOID DEPENDENCE (H): ICD-10-CM

## 2024-01-03 DIAGNOSIS — T45.1X5A CHEMOTHERAPY-INDUCED NEUROPATHY (H): ICD-10-CM

## 2024-01-03 DIAGNOSIS — G62.0 CHEMOTHERAPY-INDUCED NEUROPATHY (H): ICD-10-CM

## 2024-01-03 DIAGNOSIS — F41.9 ANXIETY: ICD-10-CM

## 2024-01-03 DIAGNOSIS — M06.09 RHEUMATOID ARTHRITIS OF MULTIPLE SITES WITH NEGATIVE RHEUMATOID FACTOR (H): ICD-10-CM

## 2024-01-03 PROCEDURE — 99214 OFFICE O/P EST MOD 30 MIN: CPT | Mod: 95 | Performed by: PHYSICIAN ASSISTANT

## 2024-01-03 RX ORDER — ESCITALOPRAM OXALATE 20 MG/1
20 TABLET ORAL DAILY
Qty: 90 TABLET | Refills: 3 | OUTPATIENT
Start: 2024-01-03

## 2024-01-03 NOTE — TELEPHONE ENCOUNTER
No further refills without office visit.  Due for medication check.  Electronically signed:    Phani Jason PA-C

## 2024-01-03 NOTE — PROGRESS NOTES
Lissa is a 73 year old who is being evaluated via a billable video visit.      How would you like to obtain your AVS? MyChart  If the video visit is dropped, the invitation should be resent by: Text to cell phone: [phone]  Will anyone else be joining your video visit? No          Assessment & Plan     Infection due to 2019 novel coronavirus  COVID-19 positive patient.  Encounter for consideration of medication intervention. Patient does qualify for a prescription. Full discussion with patient including medication options, risks and benefits. Potential drug interactions reviewed with patient.     Treatment Planned Paxlovid at decreased dosing due to renal insufficiency, Rx sent to patient's preferred pharmacy    Temporary change to home medications: Do not take atorvastatin while taking Paxlovid; can resume atorvastatin after completing 5 days of Paxlovid treatment. Reduce dose of oxycodone (Percocet) to half of normal dose while taking Paxlovid; can resume normal dosing after completing 5 days of Paxlovid treatment.    Advised close monitoring and follow up if symptoms worsen or do not alleviate or improve.     - nirmatrelvir and ritonavir (PAXLOVID) 150 mg/100 mg therapy pack; Take 2 tablets by mouth 2 times daily for 5 days (Take one tablet of Nirmatelvir and 1 tablet of Ritonavir twice daily for 5 days)    Unspecified severe protein-calorie malnutrition (H24)  Historically noted problem and patient continues to work with specialist and PCP.    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     Rheumatoid arthritis of multiple sites with negative rheumatoid factor (H)  Historically noted problem and patient continues to work with specialist and PCP in these regards.  "    Continuous opioid dependence (H)  Following with specialist    Stage 3a chronic kidney disease (H)  Chronic, stable. Reviewed recent labs and most recent GFR was 61 but will use renal dosing of Paxolid given previous GFRs <60. No GFR <30 noted.       Estimated body mass index is 21.79 kg/m  as calculated from the following:    Height as of 12/4/23: 1.676 m (5' 6\").    Weight as of 12/4/23: 61.2 kg (135 lb).  GFR Estimate   Date Value Ref Range Status   11/27/2023 61 >60 mL/min/1.73m2 Final   06/24/2021 50 (L) >60 mL/min/[1.73_m2] Final     Comment:     Non  GFR Calc  Starting 12/18/2018, serum creatinine based estimated GFR (eGFR) will be   calculated using the Chronic Kidney Disease Epidemiology Collaboration   (CKD-EPI) equation.       GFR, ESTIMATED POCT   Date Value Ref Range Status   03/07/2023 48 (L) >60 mL/min/1.73m2 Final     Lab Results   Component Value Date    [id number] Negative 10/25/2021       Joyce Martinez PA-C  [facility] RUBEN Alcaraz is a 73 year old, presenting for the following health issues:  Covid Concern        1/3/2024    10:18 AM   Additional Questions   Roomed by Alix DE LEÓN       History of Present Illness       Reason for visit:  Covid positive  Symptom onset:  1-3 days ago  Symptoms include:  Runny nose, 100 degree temp, body aches, sore throat, congested, no taste  Symptom intensity:  Moderate  Symptom progression:  Staying the same  Had these symptoms before:  Yes  Has tried/received treatment for these symptoms:  Yes  Previous treatment was successful:  Yes  Prior treatment description:  Paxlovid  What makes it worse:  Exercise, movement  What makes it better:  Resting, paxlovid    She eats 2-3 servings of fruits and vegetables daily.She consumes 0 sweetened beverage(s) daily.She exercises with enough effort to increase her heart rate 60 or more minutes per day.  She exercises with enough effort to increase her heart rate 5 " days per week.   She is taking medications regularly.       COVID-19 Symptom Review  How many days ago did these symptoms start? yesterday    Are any of the following symptoms significant for you?  New or worsening difficulty breathing? No  Worsening cough? Yes, it's a dry cough.   Fever or chills? Yes, the highest temperature was 100 (with tylenol)  Headache: YES  Sore throat: YES  Chest pain: YES- mild, just overall body aches  Diarrhea: No  Body aches? YES    What treatments has patient tried? Antihistamines    Does patient live in a nursing home, group home, or shelter? YES  Does patient have a way to get food/medications during quarantined? Yes, I have a friend or family member who can help me.        Symptoms started last night - nasal congestion  This morning, body aches, fever 100 F, mild cough, no shortness of breath, wheezing    She had COVID about 4 months ago and did take Paxlovid but not until day 5 of symptoms. She feels like it helped some but is hoping to start it earlier this time.    CKD stage 3, stable  GFR Estimate   Date Value Ref Range Status   11/27/2023 61 >60 mL/min/1.73m2 Final   10/29/2023 65 >60 mL/min/1.73m2 Final   09/03/2023 58 (L) >60 mL/min/1.73m2 Final   06/24/2021 50 (L) >60 mL/min/[1.73_m2] Final     Comment:     Non  GFR Calc  Starting 12/18/2018, serum creatinine based estimated GFR (eGFR) will be   calculated using the Chronic Kidney Disease Epidemiology Collaboration   (CKD-EPI) equation.     06/06/2021 75 >60 mL/min/[1.73_m2] Final     Comment:     Non  GFR Calc  Starting 12/18/2018, serum creatinine based estimated GFR (eGFR) will be   calculated using the Chronic Kidney Disease Epidemiology Collaboration   (CKD-EPI) equation.     06/05/2021 86 >60 mL/min/[1.73_m2] Final     Comment:     Non  GFR Calc  Starting 12/18/2018, serum creatinine based estimated GFR (eGFR) will be   calculated using the Chronic Kidney Disease  Epidemiology Collaboration   (CKD-EPI) equation.       GFR, ESTIMATED POCT   Date Value Ref Range Status   03/07/2023 48 (L) >60 mL/min/1.73m2 Final   09/06/2022 60 (L) >60 mL/min/1.73m2 Final   05/02/2022 60 (L) >60 mL/min/1.73m2 Final     GFR Estimate If Black   Date Value Ref Range Status   06/24/2021 58 (L) >60 mL/min/[1.73_m2] Final     Comment:      GFR Calc  Starting 12/18/2018, serum creatinine based estimated GFR (eGFR) will be   calculated using the Chronic Kidney Disease Epidemiology Collaboration   (CKD-EPI) equation.     06/06/2021 87 >60 mL/min/[1.73_m2] Final     Comment:      GFR Calc  Starting 12/18/2018, serum creatinine based estimated GFR (eGFR) will be   calculated using the Chronic Kidney Disease Epidemiology Collaboration   (CKD-EPI) equation.     06/05/2021 >90 >60 mL/min/[1.73_m2] Final     Comment:      GFR Calc  Starting 12/18/2018, serum creatinine based estimated GFR (eGFR) will be   calculated using the Chronic Kidney Disease Epidemiology Collaboration   (CKD-EPI) equation.         Review of Systems   Constitutional, HEENT, cardiovascular, pulmonary, gi and gu systems are negative, except as otherwise noted.      Objective    Vitals - Patient Reported  Temperature (Patient Reported): 100  F (37.8  C)  Pain Score: Moderate Pain (4)      Vitals:  No vitals were obtained today due to virtual visit.    Physical Exam   GENERAL: Healthy, alert and no distress  EYES: Eyes grossly normal to inspection.  No discharge or erythema, or obvious scleral/conjunctival abnormalities.  RESP: No audible wheeze, cough, or visible cyanosis.  No visible retractions or increased work of breathing.    SKIN: Visible skin clear. No significant rash, abnormal pigmentation or lesions.  NEURO: Cranial nerves grossly intact.  Mentation and speech appropriate for age.  PSYCH: Mentation appears normal, affect normal/bright, judgement and insight intact, normal speech and  appearance well-groomed.    Video-Visit Details    Type of service:  Video Visit   Video Start Time: 10:39 AM  Video End Time:10:49 AM    Originating Location (pt. Location): Home    Distant Location (provider location):  On-site  Platform used for Video Visit: Vee

## 2024-01-08 NOTE — TELEPHONE ENCOUNTER
Called patient and she is in [place] for another 6 weeks she will call back to scheduled that appointment.

## 2024-01-11 ENCOUNTER — PATIENT OUTREACH (OUTPATIENT)
Dept: ONCOLOGY | Facility: CLINIC | Age: 74
End: 2024-01-11
Payer: COMMERCIAL

## 2024-01-11 DIAGNOSIS — B37.81 CANDIDA INFECTION, ESOPHAGEAL (H): Primary | ICD-10-CM

## 2024-01-11 NOTE — PROGRESS NOTES
Patient requesting transfer to an [facility] G-I doctor per my chart.  She was treated for a fungal infection by her G-I doctor and is no better.  Dr. Sauer gave an order verbally for G-I referral.  Writer will place and message G-I about getting her in sooner.

## 2024-01-12 ENCOUNTER — DOCUMENTATION ONLY (OUTPATIENT)
Dept: GASTROENTEROLOGY | Facility: CLINIC | Age: 74
End: 2024-01-12
Payer: COMMERCIAL

## 2024-01-12 NOTE — PROGRESS NOTES
Called [facility] to request that they fax over medical records pertaining to recent urgent referral.      [facility] Representative noted that they would send records from 2023.     Clinic Information:  [facility]  Phone #: [phone] extension [phone]

## 2024-01-24 ENCOUNTER — MYC MEDICAL ADVICE (OUTPATIENT)
Dept: FAMILY MEDICINE | Facility: OTHER | Age: 74
End: 2024-01-24
Payer: COMMERCIAL

## 2024-01-24 NOTE — TELEPHONE ENCOUNTER
Patient returning call.  She states that she is not having any symptoms of alcohol withdrawal at this time.  States she stopped drinking yesterday and has had difficulty with stopping in past.  She is requesting naltrexone be prescribed to assist her.  I did let her know again (as per [facility] message) she will need to be seen  did offer to check for appointment tomorrow at [place].  She declined.  Per [facility] message an RN had advised already she be seen in urgent care or ED.  She states will go to urgent care.  Made aware that if she is having any symptoms of withdrawal she needs to be seen in ED.  She states understanding.  States is not having symptoms of withdrawal.  She states will go to urgent care for assessment/care needs.  Mercedes ADLER RN

## 2024-01-28 ENCOUNTER — HOSPITAL ENCOUNTER (EMERGENCY)
Facility: CLINIC | Age: 74
Discharge: HOME | End: 2024-01-28
Attending: STUDENT IN AN ORGANIZED HEALTH CARE EDUCATION/TRAINING PROGRAM | Admitting: STUDENT IN AN ORGANIZED HEALTH CARE EDUCATION/TRAINING PROGRAM
Payer: MEDICARE

## 2024-01-28 ENCOUNTER — APPOINTMENT (OUTPATIENT)
Dept: GENERAL RADIOLOGY | Facility: CLINIC | Age: 74
End: 2024-01-28
Attending: STUDENT IN AN ORGANIZED HEALTH CARE EDUCATION/TRAINING PROGRAM
Payer: MEDICARE

## 2024-01-28 VITALS
SYSTOLIC BLOOD PRESSURE: 160 MMHG | WEIGHT: 140 LBS | HEART RATE: 71 BPM | RESPIRATION RATE: 26 BRPM | TEMPERATURE: 97.3 F | BODY MASS INDEX: 22.6 KG/M2 | DIASTOLIC BLOOD PRESSURE: 87 MMHG | OXYGEN SATURATION: 96 %

## 2024-01-28 DIAGNOSIS — R10.13 EPIGASTRIC DISCOMFORT: ICD-10-CM

## 2024-01-28 LAB
ALBUMIN SERPL BCG-MCNC: 4.4 G/DL (ref 3.5–5.2)
ALP SERPL-CCNC: 64 U/L (ref 40–150)
ALT SERPL W P-5'-P-CCNC: 27 U/L (ref 0–50)
ANION GAP SERPL CALCULATED.3IONS-SCNC: 13 MMOL/L (ref 7–15)
AST SERPL W P-5'-P-CCNC: 28 U/L (ref 0–45)
BASOPHILS # BLD AUTO: 0.1 10E3/UL (ref 0–0.2)
BASOPHILS NFR BLD AUTO: 1 %
BILIRUB SERPL-MCNC: 0.2 MG/DL
BUN SERPL-MCNC: 35.4 MG/DL (ref 8–23)
CALCIUM SERPL-MCNC: 10.2 MG/DL (ref 8.8–10.2)
CHLORIDE SERPL-SCNC: 101 MMOL/L (ref 98–107)
CREAT SERPL-MCNC: 0.91 MG/DL (ref 0.51–0.95)
DEPRECATED HCO3 PLAS-SCNC: 22 MMOL/L (ref 22–29)
EGFRCR SERPLBLD CKD-EPI 2021: 66 ML/MIN/1.73M2
EOSINOPHIL # BLD AUTO: 0.1 10E3/UL (ref 0–0.7)
EOSINOPHIL NFR BLD AUTO: 1 %
ERYTHROCYTE [DISTWIDTH] IN BLOOD BY AUTOMATED COUNT: 16.7 % (ref 10–15)
FLUAV RNA SPEC QL NAA+PROBE: NEGATIVE
FLUBV RNA RESP QL NAA+PROBE: NEGATIVE
GLUCOSE SERPL-MCNC: 83 MG/DL (ref 70–99)
HCT VFR BLD AUTO: 36.1 % (ref 35–47)
HGB BLD-MCNC: 11.9 G/DL (ref 11.7–15.7)
IMM GRANULOCYTES # BLD: 0 10E3/UL
IMM GRANULOCYTES NFR BLD: 0 %
LYMPHOCYTES # BLD AUTO: 2.6 10E3/UL (ref 0.8–5.3)
LYMPHOCYTES NFR BLD AUTO: 27 %
MCH RBC QN AUTO: 33.1 PG (ref 26.5–33)
MCHC RBC AUTO-ENTMCNC: 33 G/DL (ref 31.5–36.5)
MCV RBC AUTO: 100 FL (ref 78–100)
MONOCYTES # BLD AUTO: 0.8 10E3/UL (ref 0–1.3)
MONOCYTES NFR BLD AUTO: 8 %
NEUTROPHILS # BLD AUTO: 6.1 10E3/UL (ref 1.6–8.3)
NEUTROPHILS NFR BLD AUTO: 63 %
NRBC # BLD AUTO: 0 10E3/UL
NRBC BLD AUTO-RTO: 0 /100
PLATELET # BLD AUTO: 284 10E3/UL (ref 150–450)
POTASSIUM SERPL-SCNC: 3.8 MMOL/L (ref 3.4–5.3)
PROT SERPL-MCNC: 7.3 G/DL (ref 6.4–8.3)
RBC # BLD AUTO: 3.6 10E6/UL (ref 3.8–5.2)
RSV RNA SPEC NAA+PROBE: NEGATIVE
SARS-COV-2 RNA RESP QL NAA+PROBE: POSITIVE
SODIUM SERPL-SCNC: 136 MMOL/L (ref 135–145)
TROPONIN T SERPL HS-MCNC: 63 NG/L
TROPONIN T SERPL HS-MCNC: 65 NG/L
WBC # BLD AUTO: 9.7 10E3/UL (ref 4–11)

## 2024-01-28 PROCEDURE — 258N000003 HC RX IP 258 OP 636: Performed by: STUDENT IN AN ORGANIZED HEALTH CARE EDUCATION/TRAINING PROGRAM

## 2024-01-28 PROCEDURE — 96361 HYDRATE IV INFUSION ADD-ON: CPT | Performed by: STUDENT IN AN ORGANIZED HEALTH CARE EDUCATION/TRAINING PROGRAM

## 2024-01-28 PROCEDURE — 93005 ELECTROCARDIOGRAM TRACING: CPT | Performed by: STUDENT IN AN ORGANIZED HEALTH CARE EDUCATION/TRAINING PROGRAM

## 2024-01-28 PROCEDURE — 80053 COMPREHEN METABOLIC PANEL: CPT | Performed by: STUDENT IN AN ORGANIZED HEALTH CARE EDUCATION/TRAINING PROGRAM

## 2024-01-28 PROCEDURE — 36415 COLL VENOUS BLD VENIPUNCTURE: CPT | Performed by: STUDENT IN AN ORGANIZED HEALTH CARE EDUCATION/TRAINING PROGRAM

## 2024-01-28 PROCEDURE — 99284 EMERGENCY DEPT VISIT MOD MDM: CPT | Mod: 25 | Performed by: STUDENT IN AN ORGANIZED HEALTH CARE EDUCATION/TRAINING PROGRAM

## 2024-01-28 PROCEDURE — 85025 COMPLETE CBC W/AUTO DIFF WBC: CPT | Performed by: STUDENT IN AN ORGANIZED HEALTH CARE EDUCATION/TRAINING PROGRAM

## 2024-01-28 PROCEDURE — 71046 X-RAY EXAM CHEST 2 VIEWS: CPT

## 2024-01-28 PROCEDURE — 250N000011 HC RX IP 250 OP 636: Performed by: STUDENT IN AN ORGANIZED HEALTH CARE EDUCATION/TRAINING PROGRAM

## 2024-01-28 PROCEDURE — 250N000013 HC RX MED GY IP 250 OP 250 PS 637: Performed by: STUDENT IN AN ORGANIZED HEALTH CARE EDUCATION/TRAINING PROGRAM

## 2024-01-28 PROCEDURE — 93010 ELECTROCARDIOGRAM REPORT: CPT | Performed by: STUDENT IN AN ORGANIZED HEALTH CARE EDUCATION/TRAINING PROGRAM

## 2024-01-28 PROCEDURE — 87637 SARSCOV2&INF A&B&RSV AMP PRB: CPT | Performed by: STUDENT IN AN ORGANIZED HEALTH CARE EDUCATION/TRAINING PROGRAM

## 2024-01-28 PROCEDURE — 84484 ASSAY OF TROPONIN QUANT: CPT | Performed by: STUDENT IN AN ORGANIZED HEALTH CARE EDUCATION/TRAINING PROGRAM

## 2024-01-28 PROCEDURE — 96360 HYDRATION IV INFUSION INIT: CPT | Performed by: STUDENT IN AN ORGANIZED HEALTH CARE EDUCATION/TRAINING PROGRAM

## 2024-01-28 PROCEDURE — 99285 EMERGENCY DEPT VISIT HI MDM: CPT | Mod: 25 | Performed by: STUDENT IN AN ORGANIZED HEALTH CARE EDUCATION/TRAINING PROGRAM

## 2024-01-28 RX ORDER — HEPARIN SODIUM (PORCINE) LOCK FLUSH IV SOLN 100 UNIT/ML 100 UNIT/ML
5-10 SOLUTION INTRAVENOUS
Status: DISCONTINUED | OUTPATIENT
Start: 2024-01-28 | End: 2024-01-28 | Stop reason: HOSPADM

## 2024-01-28 RX ORDER — MORPHINE SULFATE 4 MG/ML
4 INJECTION, SOLUTION INTRAMUSCULAR; INTRAVENOUS ONCE
Status: COMPLETED | OUTPATIENT
Start: 2024-01-28 | End: 2024-01-28

## 2024-01-28 RX ORDER — FAMOTIDINE 20 MG/1
20 TABLET, FILM COATED ORAL 2 TIMES DAILY
Qty: 30 TABLET | Refills: 0 | Status: SHIPPED | OUTPATIENT
Start: 2024-01-28 | End: 2024-05-06

## 2024-01-28 RX ORDER — ACETAMINOPHEN 500 MG
1000 TABLET ORAL ONCE
Status: COMPLETED | OUTPATIENT
Start: 2024-01-28 | End: 2024-01-28

## 2024-01-28 RX ORDER — SUCRALFATE ORAL 1 G/10ML
1 SUSPENSION ORAL 4 TIMES DAILY
Qty: 100 ML | Refills: 0 | Status: SHIPPED | OUTPATIENT
Start: 2024-01-28 | End: 2024-05-06

## 2024-01-28 RX ORDER — OMEPRAZOLE 40 MG/1
40 CAPSULE, DELAYED RELEASE ORAL DAILY
COMMUNITY
Start: 2023-12-19 | End: 2024-05-06

## 2024-01-28 RX ORDER — MAGNESIUM HYDROXIDE/ALUMINUM HYDROXICE/SIMETHICONE 120; 1200; 1200 MG/30ML; MG/30ML; MG/30ML
15 SUSPENSION ORAL ONCE
Status: COMPLETED | OUTPATIENT
Start: 2024-01-28 | End: 2024-01-28

## 2024-01-28 RX ADMIN — SODIUM CHLORIDE 1000 ML: 9 INJECTION, SOLUTION INTRAVENOUS at 15:51

## 2024-01-28 RX ADMIN — HEPARIN 5 ML: 100 SYRINGE at 18:10

## 2024-01-28 RX ADMIN — MORPHINE SULFATE 4 MG: 4 INJECTION, SOLUTION INTRAMUSCULAR; INTRAVENOUS at 16:51

## 2024-01-28 RX ADMIN — ALUMINUM HYDROXIDE, MAGNESIUM HYDROXIDE, AND SIMETHICONE 15 ML: 200; 200; 20 SUSPENSION ORAL at 15:58

## 2024-01-28 RX ADMIN — ACETAMINOPHEN 1000 MG: 500 TABLET ORAL at 16:47

## 2024-01-28 ASSESSMENT — ACTIVITIES OF DAILY LIVING (ADL)
ADLS_ACUITY_SCORE: 35
ADLS_ACUITY_SCORE: 35

## 2024-01-28 NOTE — ED TRIAGE NOTES
Pt mild tachypnea and SOA with reports of chest pain over last few days with known history of esophageal candida. States pain from candida and URI pain worsening with weakness today. Denies fevers. VSS

## 2024-01-28 NOTE — DISCHARGE INSTRUCTIONS
Please follow-up with your primary care doctor as soon as you can to reevaluate your current symptoms.  He is continue to follow-up with GI as already scheduled.  He sent you home with medication to help with GI distress called famotidine.  Short course of oxycodone for pain control provided.  Please be careful with this medication as it can cause lightheadedness and dizziness.

## 2024-01-28 NOTE — ED PROVIDER NOTES
History     Chief Complaint   Patient presents with    Chest Pain     HPI  Michaela Dorman is a 73 year old female with history of breast cancer and bladder cancer s/p cystectomy presents with progressively worsening weakness that started last week with a sore throat and cough. Now to the point of having difficulty walking due to the weakness and shortness of breath. COVID positive on 1/1/24 and started on 3 weeks prednisone at that time which she recently finished. Was treated for esophageal candida 2 months ago but did not feel like this resolved. Currently has severe constant pain in her central chest directly below her sternum which she attributes to her esophageal candida. Says the pain flares up at night and nothing helps relieve the pain. Rates it at 9/10 pain. Decreased appetite and oral intake, has not been drinking much fluids. Gradual onset headache that began 2 days ago without vision changes or photophobia, Tylenol has helped relieve it some. No history of migraines. No nausea or vomiting. No fevers or night sweats. No urinary symptoms, patient does not have a bladder. No diarrhea. Constipated last week but last bowel movement was yesterday and stool appeared normal.     Allergies:  Allergies   Allergen Reactions    Oxybutynin      Patient reports symptoms of nausea and mind fogginess       Problem List:    Patient Active Problem List    Diagnosis Date Noted    Chemotherapy-induced neuropathy  (H24) 01/03/2024     Priority: Medium    Unspecified severe protein-calorie malnutrition (H24) 01/03/2024     Priority: Medium    Chronic fatigue syndrome 07/07/2023     Priority: Medium    Vision changes 07/07/2023     Priority: Medium    Balance problems 07/07/2023     Priority: Medium    Skin sensation disturbance 07/07/2023     Priority: Medium    Ileostomy status (H) 07/07/2023     Priority: Medium    Acquired hypothyroidism 07/07/2023     Priority: Medium    Hypothyroidism due to medication 07/07/2023      Priority: Medium    Pulmonary nodules 07/07/2023     Priority: Medium    F11.2 - Continuous opioid dependence (H) 07/07/2023     Priority: Medium    Status post ileal conduit (H) 09/12/2022     Priority: Medium    Status of artificial opening of urinary tract (H) 08/19/2022     Priority: Medium    Rheumatoid arthritis of multiple sites with negative rheumatoid factor (H) 02/25/2022     Priority: Medium    Parathyroid abnormality (H24) 02/25/2022     Priority: Medium    Hypomagnesemia 11/18/2021     Priority: Medium    Need for prophylactic vaccination and inoculation against influenza 11/18/2021     Priority: Medium    Constipation, unspecified constipation type 09/28/2021     Priority: Medium    Imaging abnormalities 09/28/2021     Priority: Medium    Anemia in neoplastic disease 05/21/2021     Priority: Medium    Chemotherapy-induced neutropenia  (H24) 05/19/2021     Priority: Medium    Thrombocytopenia (H24) 05/19/2021     Priority: Medium    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) 04/07/2021     Priority: Medium     Check 11.21 for f/u Marty  Added automatically from request for surgery [id number]      Malignant neoplasm of overlapping sites of bladder (H) 03/08/2021     Priority: Medium    Personal history of malignant neoplasm of bladder 03/04/2021     Priority: Medium     Added automatically from request for surgery [id number]      Benign paroxysmal positional vertigo, bilateral 12/24/2020     Priority: Medium    Personal history of malignant neoplasm of breast 10/01/2020     Priority: Medium    Acute cystitis with hematuria 10/01/2020     Priority: Medium    Symptomatic stenosis of left carotid artery 09/22/2020     Priority: Medium     Added automatically from request for surgery [id number]      POSSIBLE Right internal carotid artery aneurysm 09/08/2020     Priority: Medium    Carotid stenosis, bilateral L80%, R40% 09/02/2020     Priority: Medium    Carotid artery plaque, bilateral 09/02/2020      Priority: Medium    Anemia 07/16/2020     Priority: Medium    S/P lumbar fusion 02/04/2020     Priority: Medium    Bilateral impacted cerumen 01/13/2020     Priority: Medium    Stage 3a chronic kidney disease (H) 09/09/2019     Priority: Medium    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      Central stenosis of spinal canal 12/01/2017     Priority: Medium     lumbar        DDD (degenerative disc disease), lumbar 12/01/2017     Priority: Medium    Chronic pain syndrome 11/30/2017     Priority: Medium     substancePatient is followed by Phani Jason PA-C for ongoing prescription of pain medication.  All refills should only be approved by this provider, or covering partner.    Medication(s): Oxycodone IR 5mg.   Maximum quantity per month: 20  Clinic visit frequency required: Q 3 months     Controlled substance agreement:  Encounter-Level CSA:       There are no encounter-level csa.        Pain Clinic evaluation in the past: Yes       Date/Location:      DIRE Total Score(s):  No flowsheet data found.    Last [facility] website verification:  none   [email or web address]            Lumbar radiculopathy 11/30/2017     Priority: Medium    S/P reverse total shoulder arthroplasty, right 06/05/2017     Priority: Medium    Prophylactic antibiotic for dental procedure indicated due to prior joint replacement 06/05/2017     Priority: Medium    Hyperlipidemia LDL goal <130 05/30/2017     Priority: Medium    Anxiety 03/29/2017     Priority: Medium    S/P bilateral mastectomy 02/08/2017     Priority: Medium    Encounter for antineoplastic chemotherapy 10/07/2016     Priority: Medium    Malignant neoplasm of upper-outer quadrant of  left breast in female, estrogen receptor negative (H) 10/06/2016     Priority: Medium    Arthritis of shoulder region, right 11/17/2015     Priority: Medium    Adjustment disorder with depressed mood 11/11/2015     Priority: Medium    Baker's cyst of knee 02/09/2015     Priority: Medium    Patella-femoral syndrome 02/09/2015     Priority: Medium    Osteoarthrosis, hip 02/05/2013     Priority: Medium    Tinnitus of right ear 02/05/2013     Priority: Medium    Hypertension goal BP (blood pressure) < 140/90 10/04/2011     Priority: Medium    Health Care Home 09/29/2011     Priority: Medium     x  DX V65.8 REPLACED WITH 21219 HEALTH CARE HOME (04/08/2013)      Advanced directives, counseling/discussion 08/22/2011     Priority: Medium     Advance Directive Problem List Overview:   Name Relationship Phone    Primary Health Care Agent            Alternative Health Care Agent          Patient states has Advance Directive and will bring in a copy to clinic. 8/22/2011         Trochanteric bursitis 01/06/2009     Priority: Medium    Family history of stroke (cerebrovascular) 03/07/2008     Priority: Medium    Enthesopathy of hip region 01/30/2006     Priority: Medium        Past Medical History:    Past Medical History:   Diagnosis Date    Acute kidney injury (H24)     Carotid stenosis, bilateral L80%, R40% 09/02/2020    Central stenosis of spinal canal 12/01/2017    DDD (degenerative disc disease), lumbar 12/01/2017    Depressive disorder, not elsewhere classified     Esophageal reflux     ETOH abuse     Foraminal stenosis of lumbar region 12/01/2017    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    Subdural hematoma (H)     Thyroid disease     Urothelial carcinoma (H)        Past Surgical History:    Past Surgical History:    Procedure Laterality Date    ARTHROPLASTY SHOULDER Right 11/17/2015    ARTHROSCOPY KNEE  6/7/2012    Procedure:ARTHROSCOPY KNEE; right knee arthroscopy with partial lateral menisectomy; Surgeon:DAMIÁN MCALLISTER; Location:[facility]    BIOPSY VAGINAL N/A 10/27/2021    Procedure: DISTAL URETHRECTOMY;  Surgeon: Leonardo Robles MD;  Location: [facility] OR    COLONOSCOPY N/A 12/22/2017    Procedure: COLONOSCOPY;  colonoscopy;  Surgeon: Chuck Chand MD;  Location: [facility]    CYSTECTOMY BLADDER RADICAL, ILEAL DIVERSION, COMBINED N/A 6/1/2021    Procedure: RADICAL CYSTECTOMY  WITH ILEAL CONDUIT CREATION,BILATERAL PELVIC LYMPHADENECTOMY;  Surgeon: Leonardo Robles MD;  Location: [facility] OR    CYSTOSCOPY, RETROGRADES, COMBINED Bilateral 3/18/2021    Procedure: CYSTOSCOPY, WITH RETROGRADE PYELOGRAM;  Surgeon: Girish Jack MD;  Location: [facility]    CYSTOSCOPY, TRANSURETHRAL RESECTION (TUR) TUMOR BLADDER, COMBINED N/A 3/18/2021    Procedure: CYSTOSCOPY, WITH TRANSURETHRAL RESECTION BLADDER TUMOR;  Surgeon: Girish Jack MD;  Location: [facility]    ENDARTERECTOMY CAROTID Left 10/8/2020    Procedure: LEFT CAROTID ENDARTERECTOMY WITH EEG;  Surgeon: Lacho Jasmine MD;  Location: [facility]    ESOPHAGOSCOPY, GASTROSCOPY, DUODENOSCOPY (EGD), COMBINED N/A 6/20/2022    Procedure: ESOPHAGOGASTRODUODENOSCOPY, WITH BIOPSY;  Surgeon: Ramses Arenas MD;  Location: [facility]    EXCISE MASS AXILLA Left 9/16/2016    Procedure: EXCISE MASS AXILLA;  Surgeon: Keyon Blanco MD;  [facility]: [facility]    HC REMV CATARACT EXTRACAP,INSERT LENS, W/O ECP  6/25/2009    Right eye    INJECT EPIDURAL LUMBAR N/A 4/11/2019    Procedure: interlaminar epidual steroid injection lumbar 4-5;  Surgeon: Oskar Salvador MD;  [facility]    INJECT EPIDURAL LUMBAR Bilateral 9/12/2019    Procedure: lumbar 4-5 epidural interlaminar steroid injection;  Surgeon: Oskar Salvador MD;  [facility]    INSERT PORT VASCULAR ACCESS Right 10/7/2016    Procedure:  INSERT PORT VASCULAR ACCESS;  Surgeon: Keyon Blanco MD;  [facility]    IR CHEST PORT PLACEMENT > 5 YRS OF AGE  2021    MASTECTOMY SIMPLE BILATERAL Bilateral 2017    Procedure: MASTECTOMY SIMPLE BILATERAL;  Surgeon: Keyon Blanco MD;  [facility]    OPEN REDUCTION INTERNAL FIXATION FOOT Right 3/20/2015    Procedure: OPEN REDUCTION INTERNAL FIXATION FOOT;  Surgeon: Javi Herrmann DPM;  [facility]    OPTICAL TRACKING SYSTEM FUSION SPINE POSTERIOR LUMBAR TWO LEVELS N/A 2020    Procedure: LUMBAR 2-SACRAL1 TRANSFORMINAL INTERBODY FUSION;  Surgeon: Lenny Gomes MD;  Location[facility]    REMOVE PORT VASCULAR ACCESS Right 2018    Procedure: REMOVE PORT VASCULAR ACCESS;  right intra jugular port removal;  Surgeon: Keyon Blanco MD;  [facility]    SHOULDER SURGERY Right 2015    ZZC LIGATE FALLOPIAN TUBE      ZZC NONSPECIFIC PROCEDURE      ankle fracture surgery       Family History:    Family History   Problem Relation Age of Onset    Hypertension Mother     Thyroid Disease Mother     Cerebrovascular Disease Mother     Hypertension Father     Cerebrovascular Disease Father     Cancer Father         skin    Thyroid Disease Sister     Diabetes Brother         type 2    Depression Sister     Breast Cancer Sister         age 47    Cancer Sister         skin    Cancer Brother         inside nose       Social History:  Marital Status:   [2]  Social History     Tobacco Use    Smoking status: Former     Packs/day: 3.00     Years: 10.00     Additional pack years: 0.00     Total pack years: 30.00     Types: Cigarettes     Quit date: 1979     Years since quittin.1     Passive exposure: Never    Smokeless tobacco: Never    Tobacco comments:     approx. 3 packs daily   Vaping Use    Vaping Use: Never used   Substance Use Topics    Alcohol use: Not Currently     Comment: Stopped drinking a few weeks ago (2023)    Drug use: No        Medications:     acetaminophen (TYLENOL) 500 MG tablet  aspirin (ASA) 81 MG chewable tablet  atorvastatin (LIPITOR) 40 MG tablet  carvedilol (COREG) 6.25 MG tablet  Cyanocobalamin (B-12 PO)  famotidine (PEPCID) 20 MG tablet  gabapentin (NEURONTIN) 300 MG capsule  levothyroxine (SYNTHROID/LEVOTHROID) 25 MCG tablet  meclizine (ANTIVERT) 25 MG tablet  omeprazole (PRILOSEC) 40 MG DR capsule  oxyCODONE (ROXICODONE) 5 MG tablet  STOOL SOFTENER/LAXATIVE 50-8.6 MG tablet  sucralfate (CARAFATE) 1 GM/10ML suspension          Review of Systems   All other systems reviewed and are negative.      Physical Exam   BP: (!) 148/96  Pulse: 98  Temp: 97.3  F (36.3  C)  Resp: 26  Weight: 63.5 kg (140 lb)  SpO2: 100 %      Physical Exam  Constitutional:       General: She is not in acute distress.     Appearance: She is not ill-appearing.   Cardiovascular:      Rate and Rhythm: Normal rate and regular rhythm.      Heart sounds: Normal heart sounds.   Pulmonary:      Effort: Pulmonary effort is normal.      Breath sounds: Normal breath sounds.   Abdominal:      General: Abdomen is flat.      Palpations: Abdomen is soft.      Tenderness: There is abdominal tenderness in the epigastric area. There is no guarding or rebound.   Musculoskeletal:      Right lower leg: No edema.      Left lower leg: No edema.   Skin:     General: Skin is warm and dry.   Neurological:      General: No focal deficit present.      Mental Status: She is alert and oriented to person, place, and time.      Cranial Nerves: No cranial nerve deficit.         ED Course                 Procedures              EKG Interpretation:      Interpreted by Dr. Adame  Time reviewed: 2:30 PM  Symptoms at time of EKG: chest pain   Rhythm: normal sinus   Rate: normal  Axis: normal  Ectopy: none  Conduction: normal  ST Segments/ T Waves: No ST-T wave changes  Q Waves: none  Comparison to prior: Unchanged from 1/13/2020    Clinical Impression: normal EKG         Results for orders placed or  performed during the hospital encounter of 01/28/24 (from the past 24 hour(s))   Symptomatic Influenza A/B, RSV, & SARS-CoV2 PCR (COVID-19) Nasopharyngeal    Specimen: Nasopharyngeal; Swab   Result Value Ref Range    Influenza A PCR Negative Negative    Influenza B PCR Negative Negative    RSV PCR Negative Negative    SARS CoV2 PCR Positive (A) Negative    Narrative    Testing was performed using the Xpert Xpress CoV2/Flu/RSV Assay on the [employer] GeneXpert Instrument. This test should be ordered for the detection of SARS-CoV-2, influenza, and RSV viruses in individuals who meet clinical and/or epidemiological criteria. Test performance is unknown in asymptomatic patients. This test is for in vitro diagnostic use under the FDA EUA for laboratories certified under CLIA to perform high or moderate complexity testing. This test has not been FDA cleared or approved. A negative result does not rule out the presence of PCR inhibitors in the specimen or target RNA in concentration below the limit of detection for the assay. If only one viral target is positive but coinfection with multiple targets is suspected, the sample should be re-tested with another FDA cleared, approved, or authorized test, if coinfection would change clinical management. This test was validated by the [facility]. These laboratories are certified under the Clinical Laboratory Improvement Amendments of 1988 (CLIA-88) as qualified to perform high complexity laboratory testing.   XR Chest 2 Views    Narrative    EXAM: XR CHEST 2 VIEWS  LOCATION: [facility]  DATE: 1/28/2024    INDICATION: shortness of breath  COMPARISON: Chest CT 11/27/2023      Impression    IMPRESSION: Stable size of cardiomediastinal silhouette with right chest port tip overlying the superior vena cava. No focal airspace consolidation, pleural effusion or pneumothorax. Right shoulder arthroplasty again noted.   CBC with platelets  differential    Narrative    The following orders were created for panel order CBC with platelets differential.  Procedure                               Abnormality         Status                     ---------                               -----------         ------                     CBC with platelets and d...[[id number]]  Abnormal            Final result                 Please view results for these tests on the individual orders.   Comprehensive metabolic panel   Result Value Ref Range    Sodium 136 135 - 145 mmol/L    Potassium 3.8 3.4 - 5.3 mmol/L    Carbon Dioxide (CO2) 22 22 - 29 mmol/L    Anion Gap 13 7 - 15 mmol/L    Urea Nitrogen 35.4 (H) 8.0 - 23.0 mg/dL    Creatinine 0.91 0.51 - 0.95 mg/dL    GFR Estimate 66 >60 mL/min/1.73m2    Calcium 10.2 8.8 - 10.2 mg/dL    Chloride 101 98 - 107 mmol/L    Glucose 83 70 - 99 mg/dL    Alkaline Phosphatase 64 40 - 150 U/L    AST 28 0 - 45 U/L    ALT 27 0 - 50 U/L    Protein Total 7.3 6.4 - 8.3 g/dL    Albumin 4.4 3.5 - 5.2 g/dL    Bilirubin Total 0.2 <=1.2 mg/dL   Troponin T, High Sensitivity   Result Value Ref Range    Troponin T, High Sensitivity 65 (H) <=14 ng/L   CBC with platelets and differential   Result Value Ref Range    WBC Count 9.7 4.0 - 11.0 10e3/uL    RBC Count 3.60 (L) 3.80 - 5.20 10e6/uL    Hemoglobin 11.9 11.7 - 15.7 g/dL    Hematocrit 36.1 35.0 - 47.0 %     78 - 100 fL    MCH 33.1 (H) 26.5 - 33.0 pg    MCHC 33.0 31.5 - 36.5 g/dL    RDW 16.7 (H) 10.0 - 15.0 %    Platelet Count 284 150 - 450 10e3/uL    % Neutrophils 63 %    % Lymphocytes 27 %    % Monocytes 8 %    % Eosinophils 1 %    % Basophils 1 %    % Immature Granulocytes 0 %    NRBCs per 100 WBC 0 <1 /100    Absolute Neutrophils 6.1 1.6 - 8.3 10e3/uL    Absolute Lymphocytes 2.6 0.8 - 5.3 10e3/uL    Absolute Monocytes 0.8 0.0 - 1.3 10e3/uL    Absolute Eosinophils 0.1 0.0 - 0.7 10e3/uL    Absolute Basophils 0.1 0.0 - 0.2 10e3/uL    Absolute Immature Granulocytes 0.0 <=0.4 10e3/uL    Absolute  NRBCs 0.0 10e3/uL   Extra Tube    Narrative    The following orders were created for panel order Extra Tube.  Procedure                               Abnormality         Status                     ---------                               -----------         ------                     Extra Blue Top Tube[[id number]]                                                         Extra Purple Top Tube[[id number]]                                                         Please view results for these tests on the individual orders.   Troponin T, High Sensitivity   Result Value Ref Range    Troponin T, High Sensitivity 63 (H) <=14 ng/L     *Note: Due to a large number of results and/or encounters for the requested time period, some results have not been displayed. A complete set of results can be found in Results Review.       Medications   alum & mag hydroxide-simethicone (MAALOX) suspension 15 mL (15 mLs Oral $Given 1/28/24 1291)   sodium chloride 0.9% BOLUS 1,000 mL (0 mLs Intravenous Stopped 1/28/24 1756)   acetaminophen (TYLENOL) tablet 1,000 mg (1,000 mg Oral $Given 1/28/24 1647)   morphine (PF) injection 4 mg (4 mg Intravenous $Given 1/28/24 1651)       Assessments & Plan (with Medical Decision Making)     I have reviewed the nursing notes.    I have reviewed the findings, diagnosis, plan and need for follow up with the patient.    Medical Decision Making  73 year old female with history of breast cancer and bladder cancer s/p cystectomy presents with progressively worsening weakness that started last week and now SOB with exertion. COVID positive on 1/1/24 and started on 3 weeks prednisone at that time which she recently finished. Was treated for esophageal candida 2 months ago but did not feel like this resolved. Currently having severe constant lower central chest pain rated 8/10. Vitals stable with elevated blood pressure of 148/96 and respiratory rate of 26 with SpO2 of 100%. On exam, abdomen was soft without guarding or  rebound, tenderness to palpation in the epigastric region directly inferior to xiphoid process.     Differential diagnosis includes acute coronary syndrome, aortic dissection, cardiac dysrhythmia, viral infection, esophageal candida, hepatitis, hepatobiliary disease, and peptic ulcers.     EKG regular sinus rhythm with no ST changes and no changes in comparison to previous EKG in 2020. Initial troponin was elevated to 65, repeat 2 hour troponin was 63. CBC with normal WBC and hemoglobin. No eosinophilia which would be expected with esophageal candida. CMP with elevated urea nitrogen to 35.4 which is up from 27.3 two months ago, AST and ALT normal. Influenza A/B, RSV, and COVID swab positive for COVID which is not surprising given she was positive on 1/1/24. Chest xray was normal.     GI cocktail given without symptom relief. Morphine given for pain management. Discussed with patient her repeat troponin was stable and EKG was normal which is very reassuring. Discussed her chest xray was also normal and pulmonary etiology is unlikely to be the cause of her SOB at this time. She has no nausea or vomiting and has good oral intake. Her vitals have remained stable throughout visit. Discussed with her that there is no immediate concern and she is stable for discharge. Patient has follow up with GI on Monday and recommended she also follow up with her PCP as soon as she can. Provided famotidine for GI distress and short course of oxycodone for pain control. She is to return if any new or worsening symptoms develop.       Discharge Medication List as of 1/28/2024  5:57 PM        START taking these medications    Details   famotidine (PEPCID) 20 MG tablet Take 1 tablet (20 mg) by mouth 2 times daily, Disp-30 tablet, R-0, E-Prescribe      sucralfate (CARAFATE) 1 GM/10ML suspension Take 10 mLs (1 g) by mouth 4 times daily, Disp-100 mL, R-0, E-Prescribe             Final diagnoses:   Epigastric discomfort       1/28/2024   M  [facility] EMERGENCY DEPT    --    ED Attending Physician Attestation    I Reji Adame MD, cared for this patient with the Medical Student. I performed, or re-performed, the physical exam and medical decision-making. I have verified the accuracy of all the medical student findings and documentation above, and have edited as necessary.    Summary of HPI, PE, ED Course   Patient is a 73 year old female evaluated in the emergency department for epigastric pain.  She notes that her weakness has been intermittent and she is able to ambulate and go to the bathroom take care of herself.  But feels like she has been eating less due to the epigastric pain.  She has extensive GI follow-up and noted esophageal pathologies in the past.  She has not had any diaphoresis or sweating or any associated lightheadedness or dizziness.  Wound was at work and working appropriately.  Vitals are appropriate and stable on arrival  Otherwise HPI PE and ED course consistent with medical student evaluation in agreement with assessment.  I was present during the assessment of patient's history physical and exams and agree with ED course during extensive discussion of patient's history and evaluation    Medical Decision Making  73 presenting with epigastric pain that is been on and off for various months.  Patient has no acute signs of distress.  Her vitals are stable on arrival aside from mild hypertension.  Patient provided with GI cocktail, IV fluids and Zofran.  Patient pleasant throughout the evaluation and on reevaluations.  Lab work without signs of acute ACS but did have a mildly elevated troponin.  She is still COVID-positive however it has been positive for the past few weeks.  Her CMP and CBC were unremarkable.  Her EKG was nonconcerning for any arrhythmias.  Chest x-ray was negative for any pneumonias pneumothorax or any acute findings there.  Discussed continuing her follow-up on Tuesday with her GI specialist  for continued management did provide patient with famotidine and sucralfate for gastritis.  Otherwise in agreement with the rest of the MDM of medical student.  At this time believe that the chronic discomfort is likely secondary to esophageal pathology whether it be due to esophageal Candida versus nutcracker esophagus versus gastritis.      Reji Adame MD  Emergency Medicine        Reji Adame MD  01/28/24 3629

## 2024-01-28 NOTE — MEDICATION SCRIBE - ADMISSION MEDICATION HISTORY
"Medication Scribe Admission Medication History    Admission medication history is complete. The information provided in this note is only as accurate as the sources available at the time of the update.    Information Source(s): Patient and CareEverywhere/SureScripts via in-person    Pertinent Information: n/a    Changes made to PTA medication list:  Added: None  Deleted: lexapro, gabapentin in PLO cream, ativan, percocet, compazine  Changed: omeprazole 20mg to 40mg, Doc+ to prn    Medication Affordability:       Allergies reviewed with patient and updates made in EHR: yes    Medication History Completed By: CELSO GARCIA 1/28/2024 4:19 PM    PTA Med List   Medication Sig Last Dose    acetaminophen (TYLENOL) 500 MG tablet Take 1,000 mg by mouth 3 times daily as needed for mild pain (500MG X 2 = 1,000MG) 1/28/2024 at 1230    aspirin (ASA) 81 MG chewable tablet Take 1 tablet (81 mg) by mouth daily 1/27/2024 at am    atorvastatin (LIPITOR) 40 MG tablet Take 1 tablet (40 mg) by mouth daily 1/27/2024 at am    carvedilol (COREG) 6.25 MG tablet TAKE 1 TABLET (6.25 MG) BY MOUTH 2 TIMES DAILY (WITH MEALS) PLEASE SEND NEXT REFILL TO PRIMARY CARE AS NEPHROLOGY FOLLOW UP \"AS NEEDED\" 1/26/2024 at hs    Cyanocobalamin (B-12 PO) Take 1 tablet by mouth daily 1/26/2024 at am    gabapentin (NEURONTIN) 300 MG capsule Take 2 capsules (600 mg) by mouth 3 times daily 1/28/2024 at am    levothyroxine (SYNTHROID/LEVOTHROID) 25 MCG tablet TAKE 1 TABLET BY MOUTH EVERY DAY (Patient taking differently: Take 25 mcg by mouth daily) 1/27/2024 at am    meclizine (ANTIVERT) 25 MG tablet Take 12.5 mg by mouth every 6 hours as needed for dizziness 1/2 tablet Past Week at unkn    omeprazole (PRILOSEC) 40 MG DR capsule Take 40 mg by mouth daily 1/28/2024 at am    oxyCODONE (ROXICODONE) 5 MG tablet Take 1 tablet (5 mg) by mouth every 6 hours as needed Past Week at unkn    STOOL SOFTENER/LAXATIVE 50-8.6 MG tablet TAKE 2 TABLETS BY MOUTH DAILY AT BEDTIME " (Patient taking differently: Take 2 tablets by mouth nightly as needed for constipation) Past Month at hs

## 2024-01-29 LAB — HOLD SPECIMEN: NORMAL

## 2024-01-29 NOTE — ED NOTES
Port de-accessed and flushed with heparin. WNL pt tolerated well. VS assessed. Reviewed discharge instruction, verbalized understanding. No questions or concerns. Meds reviewed. Pt stable and ambulatory with  at discharge.

## 2024-02-08 ENCOUNTER — THERAPY VISIT (OUTPATIENT)
Dept: PHYSICAL THERAPY | Facility: CLINIC | Age: 74
End: 2024-02-08
Attending: INTERNAL MEDICINE
Payer: MEDICARE

## 2024-02-08 DIAGNOSIS — R26.89 IMBALANCE: Primary | ICD-10-CM

## 2024-02-08 PROCEDURE — 97161 PT EVAL LOW COMPLEX 20 MIN: CPT | Mod: GP | Performed by: PHYSICAL THERAPIST

## 2024-02-08 PROCEDURE — 97110 THERAPEUTIC EXERCISES: CPT | Mod: GP | Performed by: PHYSICAL THERAPIST

## 2024-02-08 PROCEDURE — 97112 NEUROMUSCULAR REEDUCATION: CPT | Mod: GP | Performed by: PHYSICAL THERAPIST

## 2024-02-08 PROCEDURE — 97116 GAIT TRAINING THERAPY: CPT | Mod: GP | Performed by: PHYSICAL THERAPIST

## 2024-02-08 NOTE — PROGRESS NOTES
"PHYSICAL THERAPY EVALUATION  Type of Visit: Evaluation    See electronic medical record for Abuse and Falls Screening details.    Subjective       Presenting condition or subjective complaint: Pt reports \"my balance is really bad\". Pt notes she started having more imbalance in [place]. Having about 2 falls per month since. Had 3 falls while in [place] in January 2024 and fell yesterday stepping down from the sauna. About 10 falls since Sept 2023. She does get dizziness and has hx of neuropathy. Pt was supposed to come to PT in Oct but had to cancel due to Covid. Goes to pool for walking 6 days per week and lifts wts 3 days per week.   Date of onset: 09/01/23 (approximately)    Relevant medical history: Anemia; Arthritis; Bladder or bowel problems; Cancer; Dizziness; High blood pressure; Radiation treatment; Significant weakness   Dates & types of surgery: Breast removal, bladder removal, back surgery/fusion, shoulder replacement    Prior diagnostic imaging/testing results:       Prior therapy history for the same diagnosis, illness or injury: No      Prior Level of Function  Transfers: Independent  Ambulation: Independent  ADL: Independent  IADL: Driving, Housekeeping, Laundry, Work, Yard work    Living Environment  Social support: With a significant other or spouse   Type of home: House; Basement; 1 level   Stairs to enter the home: No       Ramp: Yes   Stairs inside the home: Yes   Is there a railing: Yes   Help at home: None  Equipment owned: Straight Cane; Walker; Crutches; Standard wheelchair     Employment: No    Hobbies/Interests: fishing, swimming at gym    Patient goals for therapy: walk on uneven surfaces    Pain assessment: Pain present  Pt gets LBP on average 3/10, worst 7/10. Gets LE burning, numbness and weakness 2/10 average, 5-6/10 at worst. Better since gabipentin.     Objective      Cognitive Status Examination  Orientation: Oriented to person, place and time   Level of " "Consciousness: Alert  Follows Commands and Answers Questions: 100% of the time  Personal Safety and Judgement: Impulsive  Memory: Intact    OBSERVATION:   INTEGUMENTARY: Intact  POSTURE:  fair sitting posture but kyphotic lumbar spine. In sitting no LBP, B lateral thighs felt weak, sx to B shins and calves, B feet felt numb/heavy 2-3/10. No change in sx with lumbar support.   PALPATION:   RANGE OF MOTION: LE ROM WFL  UE ROM WFL  STRENGTH:  single leg heel raises R 20, L 14. MMT ankle DF 4+/5, eversion 4/5  5x sit to stand 11.5\"  30\" sit to stand 12 reps    BED MOBILITY: WFL    TRANSFERS: WNL    GAIT:   Level of Cloud: WFL  Assistive Device(s): None  Gait Deviations: Base of support decreased  Narrow HELENA when both feet on ground, lateral lurch present, excessive hip adduction    BALANCE:  SLS R 3\", L 2\"      Assessment & Plan   CLINICAL IMPRESSIONS  Medical Diagnosis: imbalance    Treatment Diagnosis: gait instability, imbalance, fall history   Impression/Assessment: Patient is a 73 year old female with gait instability and fall history and imbalance complaints.  The following significant findings have been identified: Pain, Decreased strength, Impaired balance, Decreased proprioception, Impaired gait, Impaired muscle performance, Decreased activity tolerance, and Impaired posture. These impairments interfere with their ability to perform work tasks, recreational activities, household chores, household mobility, and community mobility as compared to previous level of function.     Clinical Decision Making (Complexity):  Clinical Presentation: Stable/Uncomplicated  Clinical Presentation Rationale: based on medical and personal factors listed in PT evaluation  Clinical Decision Making (Complexity): Low complexity    PLAN OF CARE  Treatment Interventions:  Interventions: Gait Training, Manual Therapy, Neuromuscular Re-education, Therapeutic Activity, Therapeutic Exercise    Long Term Goals     PT Goal 1  Goal " "Identifier: HEP  Goal Description: pt will have good understanding of HEP to improve strength and balance and carry over to decreased falls  Target Date: 04/08/24  PT Goal 2  Goal Identifier: Balance  Goal Description: pt will improve SLS on R and L to 10\" or better to help decrease fall risk  Target Date: 04/08/24      Frequency of Treatment: every other week  Duration of Treatment: 90 days    Recommended Referrals to Other Professionals:   Education Assessment:   Learner/Method: Patient;Listening;Reading;Demonstration;Pictures/Video;No Barriers to Learning  Education Comments: home program    Risks and benefits of evaluation/treatment have been explained.   Patient/Family/caregiver agrees with Plan of Care.     Evaluation Time:     PT Eval, Low Complexity Minutes (93992): 20       Signing Clinician: MARTHA Lora [facility]                                                                                   OUTPATIENT PHYSICAL THERAPY      PLAN OF TREATMENT FOR OUTPATIENT REHABILITATION   Patient's Last Name, First Name, MJacqueArabella Feltonette  KENYON YOB: 1950   Provider's Name   [facility]   Medical Record No.  [id number]     Onset Date: 09/01/23 (approximately)  Start of Care Date: 02/08/24     Medical Diagnosis:  imbalance      PT Treatment Diagnosis:  gait instability, imbalance, fall history Plan of Treatment  Frequency/Duration: every other week/ 90 days    Certification date from 02/08/24 to 05/07/24         See note for plan of treatment details and functional goals     Gautam Perez, PT                         I CERTIFY THE NEED FOR THESE SERVICES FURNISHED UNDER        THIS PLAN OF TREATMENT AND WHILE UNDER MY CARE     (Physician attestation of this document indicates review and certification of the therapy plan).              Referring Provider:  Dennis Reed    Initial Assessment  See Epic Evaluation- Start of Care Date: " 02/08/24

## 2024-02-09 DIAGNOSIS — G89.4 CHRONIC PAIN SYNDROME: ICD-10-CM

## 2024-02-12 NOTE — TELEPHONE ENCOUNTER
Future Office Visit:   Next 5 appointments (look out 90 days)      Feb 20, 2024 12:30 PM  (Arrive by 12:25 PM)  Provider Visit with Phani Albright PA-C  [facility] ([facility] [address]  [place] [place] [id number] [phone]             Requested Prescriptions   Pending Prescriptions Disp Refills    oxyCODONE-acetaminophen (PERCOCET) 5-325 MG tablet [Pharmacy Med Name: OXYCODONE/APAP 5MG-325MG TAB] 40 tablet 0     Sig: TAKE 1 TABLET BY MOUTH EVERY 6 HOURS AS NEEDED FORSEVERE PAIN       There is no refill protocol information for this order          Unable to fill per RN protocol, will forward to provider for review.       Carol Rollins RN on 2/12/2024 at 3:28 PM

## 2024-02-13 ENCOUNTER — PATIENT OUTREACH (OUTPATIENT)
Dept: CARE COORDINATION | Facility: CLINIC | Age: 74
End: 2024-02-13

## 2024-02-13 ENCOUNTER — MYC MEDICAL ADVICE (OUTPATIENT)
Dept: ONCOLOGY | Facility: CLINIC | Age: 74
End: 2024-02-13
Payer: COMMERCIAL

## 2024-02-13 ENCOUNTER — PATIENT OUTREACH (OUTPATIENT)
Dept: ONCOLOGY | Facility: CLINIC | Age: 74
End: 2024-02-13
Payer: COMMERCIAL

## 2024-02-13 ENCOUNTER — VIRTUAL VISIT (OUTPATIENT)
Dept: ONCOLOGY | Facility: CLINIC | Age: 74
End: 2024-02-13
Attending: NURSE PRACTITIONER
Payer: COMMERCIAL

## 2024-02-13 VITALS — BODY MASS INDEX: 22.5 KG/M2 | WEIGHT: 140 LBS | HEIGHT: 66 IN

## 2024-02-13 DIAGNOSIS — F43.21 ADJUSTMENT DISORDER WITH DEPRESSED MOOD: ICD-10-CM

## 2024-02-13 DIAGNOSIS — G62.9 NEUROPATHY: ICD-10-CM

## 2024-02-13 DIAGNOSIS — F43.21 ADJUSTMENT DISORDER WITH DEPRESSED MOOD: Primary | ICD-10-CM

## 2024-02-13 DIAGNOSIS — C67.9 UROTHELIAL CARCINOMA OF BLADDER WITH INVASION OF MUSCLE (H): Primary | ICD-10-CM

## 2024-02-13 PROCEDURE — 99214 OFFICE O/P EST MOD 30 MIN: CPT | Mod: 95 | Performed by: NURSE PRACTITIONER

## 2024-02-13 RX ORDER — ESCITALOPRAM OXALATE 20 MG/1
20 TABLET ORAL DAILY
Qty: 30 TABLET | Refills: 1 | Status: SHIPPED | OUTPATIENT
Start: 2024-02-13 | End: 2024-03-20

## 2024-02-13 RX ORDER — OXYCODONE AND ACETAMINOPHEN 5; 325 MG/1; MG/1
1 TABLET ORAL EVERY 6 HOURS PRN
Qty: 40 TABLET | Refills: 0 | OUTPATIENT
Start: 2024-02-13

## 2024-02-13 ASSESSMENT — PAIN SCALES - GENERAL: PAINLEVEL: NO PAIN (0)

## 2024-02-13 ASSESSMENT — PATIENT HEALTH QUESTIONNAIRE - PHQ9: SUM OF ALL RESPONSES TO PHQ QUESTIONS 1-9: 2

## 2024-02-13 NOTE — PROGRESS NOTES
Virtual Visit Details    Type of service:  Video Visit   Video Start Time:  1243  Video End Time: 1251    Originating Location (pt. Location): Home  Distant Location (provider location):  On-site  Platform used for Video Visit: [facility]      Oncology Follow Up Visit: February 13, 2024    Oncologist: Dr. Sauer    Reason for Visit: Depression symptoms    Diagnosis:  Left-sided ER-, HER2+ breast CA:  # Sep 2016 Presented w/ left axillary mass; staging multicentric T4 lesion w/ geraldine involvement; biopsy w/ ER-, HER2+ breast cancer  # Sep 2016 - Jan 2017 Neoadjuvant AC x 4 cycles/TH x 4 cycles.  # Feb 2017 Bilateral mastectomy no with geraldine evaluation; complete path CR from invasive cancer; 7 mm DCIS residual.  # Apr 2017 - May 2017 Adjuvant radiation to left chestwall / axilla.  # Apr 2017 - Dec 2017 Adjuvant Herceptin.    Bladder cancer: ([id number])  # Oct 2020 Presented w/ dysuria.  # Feb 2021 Cytoscopy w/ muscle-invasive high grade urothelial cancer.  # Mar 2021 TURBT.  # Apr 2021 Neoadjuvant cisplatin/gem split dose complicated by TAMIR.  # May 2021 Neoadjuvant carboplatin/gem.  # Jun 2021 Radical cystectomy w/ ileal conduit; path high grade urothelial carcinoma muscle invasive; margins negs; nodes negative.  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    Interval History:  Pt called into clinic with complaints of worsening depression symptoms. Pt states she ran out of her Lexapro a couple of weeks ago after she was unable to get a refill of her medication without a provider visit. She felt as though she would be able to manage without it. But unfortunately she has had various life stresses recently with the death of a horse and getting rid of their 15 horses due to her and her 's older age and inability to care for  them. She is tearful. She denies suicidal/homicidal ideation. She saw a therapist in the past and has no interest in continuing because it was not helpful. Despite her depressed mood, she continues to remain active with exercise daily. She is sleeping well at night. She does not have a great appetite but forces herself to eat meals, particularly items high in protein. Her weight has been stable. She denies other acute illnesses. No additional questions or concerns.     Patient denies any of the following except if noted above: fevers, chills, difficulty with energy, vision or hearing changes, chest pain, dyspnea, abdominal pain, nausea, vomiting, diarrhea, constipation, urinary concerns, headaches, numbness, tingling, issues with sleep or mood. She also denies lumps, bumps, rashes or skin lesions, bleeding or bruising issues.    Physical Exam: Limited due to virtual visit restrictions.    BP Readings from Last 6 Encounters:   01/28/24 (!) 160/87   12/04/23 120/70   11/30/23 139/78   10/29/23 (!) 141/78   10/12/23 118/68   09/03/23 126/85     Wt Readings from Last 10 Encounters:   01/28/24 63.5 kg (140 lb)   12/04/23 61.2 kg (135 lb)   11/30/23 65.8 kg (145 lb)   11/28/23 61.2 kg (135 lb)   10/29/23 61.2 kg (135 lb)   10/26/23 61.2 kg (135 lb)   10/12/23 61.2 kg (135 lb)   10/10/23 61.2 kg (135 lb)   09/03/23 60.8 kg (134 lb)   08/23/23 61.2 kg (135 lb)     General: No acute distress, tearful.  Resp: Adequate rate of respirations.  Skin: No visible rash or lesions on visible areas of skin.  Neuro: A/O x 4.   Psych: Tearful at times. Appearance is well-groomed.          6/10/2022    10:18 AM 7/6/2023     9:05 AM 2/13/2024    12:21 PM   PHQ   PHQ-9 Total Score 8 11 2   Q9: Thoughts of better off dead/self-harm past 2 weeks Not at all Not at all Not at all   I reviewed the above PHQ-9 total score today.     Assessment and Plan:   #Bladder cancer: ([id number]) - completed chemotherapy, s/p cystecomy w/ileal conduit.  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 On observation currently. Seen today for worsening depressive symptoms over the last few weeks without suicidal ideation. Was previously on Lexapro 20 mg daily. Will restart this and reassess her response in 6-8 weeks. Alerted patient Lexapro requires a slow taper off medication and not to discontinue abruptly to avoid withdrawal symptoms. She is aware she needs to notify staff if she desires to go off medication. Social work consult was also placed to connect patient with any resources for mental health services she might need. Declined psychiatry services today but will consider it in the future. She is understanding and agreeable to plan.     Follow-up appointment for cancer scheduled for June 2024 w/Dr. Sauer.         Geneva GONZALEZ, CNP

## 2024-02-13 NOTE — TELEPHONE ENCOUNTER
Reviewed for Dr Sauer- see that she was on Lexapro 20 mg up to January. WIll renew and agree with SW referral. Might be good to have a virtual visit with her today to screen for any serious issues but if SW can follow up ASAp would be good. Ariane Billingsley,Cnp

## 2024-02-13 NOTE — LETTER
2/13/2024         RE: Michaela Dorman  [address]  [place] [facility] [address]        Dear Colleague,    Thank you for referring your patient, Michaela Dorman, to the [facility]. Please see a copy of my visit note below.    Virtual Visit Details    Type of service:  Video Visit   Video Start Time:  1243  Video End Time: 1251    Originating Location (pt. Location): Home  Distant Location (provider location):  On-site  Platform used for Video Visit: [facility]      Oncology Follow Up Visit: February 13, 2024    Oncologist: Dr. Sauer    Reason for Visit: Depression symptoms    Diagnosis:  Left-sided ER-, HER2+ breast CA:  # Sep 2016 Presented w/ left axillary mass; staging multicentric T4 lesion w/ geraldine involvement; biopsy w/ ER-, HER2+ breast cancer  # Sep 2016 - Jan 2017 Neoadjuvant AC x 4 cycles/TH x 4 cycles.  # Feb 2017 Bilateral mastectomy no with geraldine evaluation; complete path CR from invasive cancer; 7 mm DCIS residual.  # Apr 2017 - May 2017 Adjuvant radiation to left chestwall / axilla.  # Apr 2017 - Dec 2017 Adjuvant Herceptin.    Bladder cancer: ([id number])  # Oct 2020 Presented w/ dysuria.  # Feb 2021 Cytoscopy w/ muscle-invasive high grade urothelial cancer.  # Mar 2021 TURBT.  # Apr 2021 Neoadjuvant cisplatin/gem split dose complicated by TAMIR.  # May 2021 Neoadjuvant carboplatin/gem.  # Jun 2021 Radical cystectomy w/ ileal conduit; path high grade urothelial carcinoma muscle invasive; margins negs; nodes negative.  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    Interval History:  Pt called into clinic with complaints of worsening depression symptoms. Pt states she ran out of her Lexapro a couple of weeks ago after she was unable to get a refill of her medication without a  provider visit. She felt as though she would be able to manage without it. But unfortunately she has had various life stresses recently with the death of a horse and getting rid of their 15 horses due to her and her 's older age and inability to care for them. She is tearful. She denies suicidal/homicidal ideation. She saw a therapist in the past and has no interest in continuing because it was not helpful. Despite her depressed mood, she continues to remain active with exercise daily. She is sleeping well at night. She does not have a great appetite but forces herself to eat meals, particularly items high in protein. Her weight has been stable. She denies other acute illnesses. No additional questions or concerns.     Patient denies any of the following except if noted above: fevers, chills, difficulty with energy, vision or hearing changes, chest pain, dyspnea, abdominal pain, nausea, vomiting, diarrhea, constipation, urinary concerns, headaches, numbness, tingling, issues with sleep or mood. She also denies lumps, bumps, rashes or skin lesions, bleeding or bruising issues.    Physical Exam: Limited due to virtual visit restrictions.    BP Readings from Last 6 Encounters:   01/28/24 (!) 160/87   12/04/23 120/70   11/30/23 139/78   10/29/23 (!) 141/78   10/12/23 118/68   09/03/23 126/85     Wt Readings from Last 10 Encounters:   01/28/24 63.5 kg (140 lb)   12/04/23 61.2 kg (135 lb)   11/30/23 65.8 kg (145 lb)   11/28/23 61.2 kg (135 lb)   10/29/23 61.2 kg (135 lb)   10/26/23 61.2 kg (135 lb)   10/12/23 61.2 kg (135 lb)   10/10/23 61.2 kg (135 lb)   09/03/23 60.8 kg (134 lb)   08/23/23 61.2 kg (135 lb)     General: No acute distress, tearful.  Resp: Adequate rate of respirations.  Skin: No visible rash or lesions on visible areas of skin.  Neuro: A/O x 4.   Psych: Tearful at times. Appearance is well-groomed.          6/10/2022    10:18 AM 7/6/2023     9:05 AM 2/13/2024    12:21 PM   PHQ   PHQ-9 Total Score 8  11 2   Q9: Thoughts of better off dead/self-harm past 2 weeks Not at all Not at all Not at all   I reviewed the above PHQ-9 total score today.     Assessment and Plan:   #Bladder cancer: ([id number]) - completed chemotherapy, s/p cystecomy w/ileal conduit. 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 On observation currently. Seen today for worsening depressive symptoms over the last few weeks without suicidal ideation. Was previously on Lexapro 20 mg daily. Will restart this and reassess her response in 6-8 weeks. Alerted patient Lexapro requires a slow taper off medication and not to discontinue abruptly to avoid withdrawal symptoms. She is aware she needs to notify staff if she desires to go off medication. Social work consult was also placed to connect patient with any resources for mental health services she might need. Declined psychiatry services today but will consider it in the future. She is understanding and agreeable to plan.     Follow-up appointment for cancer scheduled for June 2024 w/Dr. Sauer.         Geneva GONZALEZ, CNP      Again, thank you for allowing me to participate in the care of your patient.        Sincerely,        Ariane Billingsley, NP, APRN CNP

## 2024-02-13 NOTE — PROGRESS NOTES
Social Work - Intervention  [facility]  Data/Intervention: Patient of Dr. Sauer on observation for bladder cancer. Hx of breast cancer.    Patient Name: Michaela Dorman Goes By: Lissa KAPLANB/Age: 1950 (73 year old)     Visit Type: telephone  Referral Source: Nata Higgins RNCC  Reason for Referral: decreased mood/support     Psychosocial Information/Concerns:  Per NP visit note dated 24 Lissa has had grief surrounding loss of horses, managing symptoms of neuropathy. Lissa did not engage much with writer but confirmed above.      Intervention/Education/Resources Provided:  Provided [facility] phone number, 899- crisis line, and educated on role of oncology SW.      Assessment/Plan:  Lissa reached out for help to re-start antidepressant. She denied need for further therapy referrals. She did not disclose any other needs for today.    SW to reach out in 3-4 weeks to check in.      Provided patient/family with contact information and availability.    JATINDER Roger, Dorothea Dix Psychiatric CenterSW   Adult On[facility] - [facility] [place]  [phone]  Onsite [place] on    *Please note does not work on .

## 2024-02-13 NOTE — PROGRESS NOTES
GREGORIO Thakkar contacted regarding patient's mychart message from today.  She will renew patient's Lexapro, offered virtual visit with patient today, social work consul placed for urgent contact.

## 2024-02-13 NOTE — NURSING NOTE
Is the patient currently in the state of [place]? YES    Visit mode:VIDEO    If the visit is dropped, the patient can be reconnected by: VIDEO VISIT: Text to cell phone:   Telephone Information:   Mobile [phone]       Will anyone else be joining the visit? NO  (If patient encounters technical issues they should call [phone]    How would you like to obtain your AVS? MyChart    Are changes needed to the allergy or medication list? Pt stated no changes to allergies and Pt stated no med changes    Reason for visit: SERGE Wahl VVF

## 2024-02-19 DIAGNOSIS — C67.9 UROTHELIAL CARCINOMA OF BLADDER WITH INVASION OF MUSCLE (H): Primary | ICD-10-CM

## 2024-02-19 DIAGNOSIS — G89.4 CHRONIC PAIN SYNDROME: ICD-10-CM

## 2024-02-19 NOTE — TELEPHONE ENCOUNTER
SUBJECTIVE/OBJECTIVE:                                                    Refill request receive for:  Oxycodone    Last refill per fax: 12/21/23  Date of LOV r/t Medication: 2/13/24  Future appt scheduled? Yes: 3/26/24   Date faxed to clinic: 2/16/24    PLAN:                                                      Sent to provider to advise.

## 2024-02-20 RX ORDER — OXYCODONE HYDROCHLORIDE 5 MG/1
5 TABLET ORAL EVERY 6 HOURS PRN
Qty: 12 TABLET | Refills: 0 | Status: SHIPPED | OUTPATIENT
Start: 2024-02-20 | End: 2024-03-14

## 2024-02-23 DIAGNOSIS — C67.9 UROTHELIAL CARCINOMA OF BLADDER WITH INVASION OF MUSCLE (H): ICD-10-CM

## 2024-02-23 DIAGNOSIS — Z51.11 ENCOUNTER FOR ANTINEOPLASTIC CHEMOTHERAPY: ICD-10-CM

## 2024-02-23 DIAGNOSIS — C50.412 MALIGNANT NEOPLASM OF UPPER-OUTER QUADRANT OF LEFT FEMALE BREAST, UNSPECIFIED ESTROGEN RECEPTOR STATUS (H): ICD-10-CM

## 2024-02-23 RX ORDER — PROCHLORPERAZINE MALEATE 10 MG
TABLET ORAL
Qty: 30 TABLET | Refills: 2 | Status: SHIPPED | OUTPATIENT
Start: 2024-02-23 | End: 2024-05-06

## 2024-02-23 NOTE — TELEPHONE ENCOUNTER
Future Office Visit:      Requested Prescriptions   Pending Prescriptions Disp Refills    prochlorperazine (COMPAZINE) 10 MG tablet [Pharmacy Med Name: PROCHLORPERAZINE 10MG TABLET] 30 tablet 2     Sig: TAKE 1/2 TABLET BY MOUTH EVERY 6 HOURS AS NEEDED FORNAUSEA/VOMITING        Antivertigo/Antiemetic Agents Failed - 2/23/2024  9:12 AM        Failed - Medication is active on med list        Passed - Medication indicated for associated diagnosis     The medication is prescribed for one or more of the following conditions:     Motion sickness   Nausea   Vomiting   Vertigo   Chemotherapy-induced nausea and vomiting   Radiation-induced nausea and vomiting,    Nausea and vomiting prophylaxis, migraine          Passed - Recent (12 mo) or future (90 days) visit with authorizing provider's specialty     The patient must have completed an in-person or virtual visit within the past 12 months or has a future visit scheduled within the next 90 days with the authorizing provider s specialty.  Urgent care and e-visits do not quality as an office visit for this protocol.          Passed - Patient is 18 years of age or older             Unable to fill per RN protocol, will forward to provider for review.       Carol Rollins RN on 2/23/2024 at 11:27 AM

## 2024-03-05 ENCOUNTER — VIRTUAL VISIT (OUTPATIENT)
Dept: FAMILY MEDICINE | Facility: OTHER | Age: 74
End: 2024-03-05
Payer: COMMERCIAL

## 2024-03-05 ENCOUNTER — TELEPHONE (OUTPATIENT)
Dept: ONCOLOGY | Facility: CLINIC | Age: 74
End: 2024-03-05

## 2024-03-05 DIAGNOSIS — J01.90 ACUTE SINUSITIS WITH SYMPTOMS > 10 DAYS: Primary | ICD-10-CM

## 2024-03-05 PROCEDURE — 99213 OFFICE O/P EST LOW 20 MIN: CPT | Mod: 95 | Performed by: PHYSICIAN ASSISTANT

## 2024-03-05 RX ORDER — AMOXICILLIN 500 MG/1
500 CAPSULE ORAL 2 TIMES DAILY
Qty: 20 CAPSULE | Refills: 0 | Status: SHIPPED | OUTPATIENT
Start: 2024-03-05 | End: 2024-03-15

## 2024-03-05 NOTE — TELEPHONE ENCOUNTER
Patient is calling to schedule a virtual visit for sinus congestion.  Informed patient of E-visit option.  Patient stated she is unfamiliar with E-visits and would feel more comfortable scheduling a virtual visit.  Scheduled virtual visit for today, 3/5/2024.  Advised patient to log into her my chart 5-10 minutes prior to her visit to check in through the website.  Patient stated understanding.  Advised patient to call back with any further questions or concerns.    Ana Redd RN

## 2024-03-05 NOTE — PROGRESS NOTES
"Instructions Relayed to Patient by Virtual Roomer:     Patient is active on [employer]:   Relayed following to patient: \"It looks like you are active on [employer], are you able to join the visit this way? If not, do you need us to send you a link now or would you like your provider to send a link via text or email when they are ready to initiate the visit?\"    Reminded patient to ensure they were logged on to virtual visit by arrival time listed. Documented in appointment notes if patient had flexibility to initiate visit sooner than arrival time. If pediatric virtual visit, ensured pediatric patient along with parent/guardian will be present for video visit.     Patient offered the website [email or web address] and/or phone number to [employer] Help line: [phone]    Lissa is a 73 year old who is being evaluated via a billable video visit.      How would you like to obtain your AVS? 2CRisk  If the video visit is dropped, the invitation should be resent by: Text to cell phone: [phone]  Will anyone else be joining your video visit? No    Assessment & Plan     ICD-10-CM    1. Acute sinusitis with symptoms > 10 days  J01.90 amoxicillin (AMOXIL) 500 MG capsule          - Patient in remission from cancer, with 2 weeks of sinusitis symptoms as below   - Due to duration and immunocompromised, recommend treatment   - Will do Amoxicillin as she has done well on this in the past   - Discussed antibiotic use, duration, and side effects  - Encouraged rest, fluids, and nasal saline   - If doesn't improve, recommend office visit       Review of the result(s) of each unique test - See list          1/28/24 - CMP, CBC   Diagnosis or treatment significantly limited by social determinants of health - None     10 minutes spent on the date of the encounter doing chart review, history and exam, documentation and further activities as noted above    The patient indicates understanding of these issues and agrees with the " plan.    Follow up: ROLDAN La-SANDY Hunt  [facility]       Bishop Alcaraz is a 73 year old, presenting for the following health issues:  Sinus Problem        3/5/2024     2:48 PM   Additional Questions   Roomed by Anatoly MEANS   Accompanied by Self     Sinus Problem      Acute Illness  Acute illness concerns: Sinus Congestion  Onset/Duration: 2 weeks  Symptoms:  Fever: No  Chills/Sweats: YES  Headache (location?): YES  Sinus Pressure: YES  Conjunctivitis:  No  Ear Pain: no  Rhinorrhea: YES  Congestion: YES- Sinus  Sore Throat: YES- Did have a sore throat in the beginning   Cough: YES - Did at first but not anymore  Wheeze: No  Decreased Appetite: YES- Can not tase  Nausea: No  Vomiting: No  Diarrhea: YES- Had diarrhea last week  Dysuria/Freq.: No  Dysuria or Hematuria: No  Fatigue/Achiness: YES  Sick/Strep Exposure: No  Therapies tried and outcome: Day quil and Antihistamine, Tylenol - Nothing really seems to work    - Started with sore throat, then bad cough and body aches, now in last few days blowing nose a lot and very plugged up   - Terrible headache   - Did saline wash and lots of yellow-green came out   - Has cancer, in remission   - Glands don't feel swollen   - Tapping on cheeks hurts   - Leaning forward hurts         Review of Systems  Constitutional, neuro, ENT, endocrine, pulmonary, cardiac, gastrointestinal, genitourinary, musculoskeletal, integument and psychiatric systems are negative, except as otherwise noted.      Objective           Vitals:  No vitals were obtained today due to virtual visit.    Physical Exam   GENERAL: alert and no distress, mild-moderately ill appearing   EYES: Eyes grossly normal to inspection.  No discharge or erythema, or obvious scleral/conjunctival abnormalities.  RESP: No audible wheeze, cough, or visible cyanosis, sounds very congested   SKIN: Visible skin clear. No significant rash, abnormal pigmentation or lesions.  NEURO:  Cranial nerves grossly intact.  Mentation and speech appropriate for age.  PSYCH: Appropriate affect, tone, and pace of words    Diagnostics: reviewed in Epic       Video-Visit Details    Type of service:  Video Visit   Video Start Time: 2:56 PM  Video End Time:2:59 PM  Originating Location (pt. Location): Home  Distant Location (provider location):  Off-site  Platform used for Video Visit: Vee  Signed Electronically by: Columba Walden PA-C

## 2024-03-12 ENCOUNTER — PATIENT OUTREACH (OUTPATIENT)
Dept: CARE COORDINATION | Facility: CLINIC | Age: 74
End: 2024-03-12
Payer: COMMERCIAL

## 2024-03-12 NOTE — PROGRESS NOTES
Social Work - Telephone/MyChart message  [facility]  Data: Patient of Dr. Sauer on observation for bladder cancer. Hx of breast cancer.   Patient Name: Michaela Dorman  Goes By: Lissa MOORE/Age: 1950 (73 year old)      Intervention: Left voice message for patient on 2024 . Following to check in, offer support.  Plan:  will await patient's return phone call/message and provide assistance at that time.   JATINDER Roger, [facility]   Adult Oncology - [place]  [phone]  Onsite [place] on    *Please note does not work on .

## 2024-03-13 DIAGNOSIS — G89.4 CHRONIC PAIN SYNDROME: ICD-10-CM

## 2024-03-14 RX ORDER — OXYCODONE HYDROCHLORIDE 5 MG/1
5 TABLET ORAL EVERY 6 HOURS PRN
Qty: 12 TABLET | Refills: 0 | Status: SHIPPED | OUTPATIENT
Start: 2024-03-14 | End: 2024-04-16

## 2024-03-14 NOTE — TELEPHONE ENCOUNTER
[facility] - Refill Request                                                          Medication requested:  oxyCODONE (ROXICODONE) 5 MG tablet     Date of last refill, if known: 2/20/24 (quantity of 12 tablets)    Last office visit: 2/13/24    Future appointment scheduled? Yes: 3/26/24     Recent Lab Results and Vital Signs:    CBC RESULTS:   Recent Labs   Lab Test 01/28/24  1521   WBC 9.7   RBC 3.60*   HGB 11.9   HCT 36.1      MCH 33.1*   MCHC 33.0   RDW 16.7*         BP Readings from Last 4 Encounters:   01/28/24 (!) 160/87   12/04/23 120/70   11/30/23 139/78   10/29/23 (!) 141/78     Refill request routed to provider for review.    Maykel Young, RN, BSN, OCN  RN Care Coordinator - Oncology  [facility]

## 2024-03-15 ENCOUNTER — MYC MEDICAL ADVICE (OUTPATIENT)
Dept: ONCOLOGY | Facility: CLINIC | Age: 74
End: 2024-03-15
Payer: COMMERCIAL

## 2024-03-16 NOTE — TELEPHONE ENCOUNTER
Future Office Visit:   Next 5 appointments (look out 90 days)    Feb 15, 2022  3:15 PM  (Arrive by 3:00 PM)  Return Visit with Leonardo Robles MD  [facility] [place] ([facility] ) [address] [place] [id number] [phone]           Requested Prescriptions   Pending Prescriptions Disp Refills     LORazepam (ATIVAN) 0.5 MG tablet [Pharmacy Med Name: LORAZEPAM 0.5MG TABLET] 30 tablet 2     Sig: TAKE 1 TABLET BY MOUHT EVERY 4 HOURS AS NEEDED FORANXIETY, NAUSEA, VOMITING OR SLEEP       There is no refill protocol information for this order          Unable to fill per RN protocol, will forward to provider.    Carol Rollins RN on 1/31/2022 at 8:45 AM     No

## 2024-03-18 NOTE — PROGRESS NOTES
"    DISCHARGE  Reason for Discharge: Patient chooses to discontinue therapy.  Pt was a cancel on 3/5 and 3/26/24 noting that her condition improved so no further PT needed.    Equipment Issued: theraband    Discharge Plan: Patient to continue home program.    Referring Provider:  Dennis Reed     02/08/24 0500   Appointment Info   Signing clinician's name / credentials Davide Perez, PT   Visits Used 1   Medical Diagnosis imbalance   PT Tx Diagnosis gait instability, imbalance, fall history   Quick Adds Certification   Progress Note/Certification   Start of Care Date 02/08/24   Onset of illness/injury or Date of Surgery 09/01/23  (approximately)   Therapy Frequency every other week   Predicted Duration 90 days   Certification date from 02/08/24   Certification date to 05/07/24   GOALS   PT Goals 2   PT Goal 1   Goal Identifier HEP   Goal Description pt will have good understanding of HEP to improve strength and balance and carry over to decreased falls   Goal Progress initiated HEP at 2/8/24 visit   Target Date 04/08/24   PT Goal 2   Goal Identifier Balance   Goal Description pt will improve SLS on R and L to 10\" or better to help decrease fall risk   Goal Progress 2-3\" at 2/8/24 visit   Target Date 04/08/24   Subjective Report   Subjective Report see evaluation for details   Objective Measures   Objective Measures Objective Measure 1;Objective Measure 2;Objective Measure 3   Objective Measure 1   Objective Measure SLS R and L ( at eval on 2/8/24 R 3\", L 2\")   Objective Measure 2   Objective Measure single leg heel raises (at eval L 14, R 20 reps)   Objective Measure 3   Objective Measure MMT   Treatment Interventions (PT)   Interventions Therapeutic Procedure/Exercise;Gait Training;Neuromuscular Re-education   Therapeutic Procedure/Exercise   Therapeutic Procedures: strength, endurance, ROM, flexibility minutes (81753) 15   Ther Proc 1 - Details pt instructed in and performed seated green band R and L ankle DF and " "eversion \x 10-30 each daily, side stepping along counter x 1-3' daily, SLS R and L   Skilled Intervention instruction   Patient Response/Progress reports understanding   Neuromuscular Re-education   Neuromuscular re-ed of mvmt, balance, coord, kinesthetic sense, posture, proprioception minutes (86248) 10   Neuro Re-ed 1 - Details Re ed with VCs and demonstration cues for improving balance from the ground up to eyes with keeping ST neutral, neutral knee alignment, glut med and core activation and gaze stab.   Skilled Intervention instruction   Patient Response/Progress reports understanding   Gait Training   Gait Training Minutes, includes stair climbing (91361) 15   Gait 1 - Details gt training with mirror feedback, VCs, demonstration cues and video feedback trying to increase HELENA as pt was \"walking a tightrope\" with some of her steps, keeping wider HELENA with turns, also cues for less lateral lurch. Pt will also have to focus while she walks and limit talking/distractions to help decrease fall risk. Amb >300' today.   Skilled Intervention instruction   Patient Response/Progress reports understanding   Eval/Assessments   PT Eval, Low Complexity Minutes (03722) 20   Education   Learner/Method Patient;Listening;Reading;Demonstration;Pictures/Video;No Barriers to Learning   Education Comments home program   Plan   Home program seated green band R and L ankle DF and eversion \x 10-30 each daily, side stepping along counter x 1-3' daily, SLS R and L, proper gait   Plan for next session in 2 weeks or so, check SLS, gt training and re ed as needed, progress ex as able   Total Session Time   Timed Code Treatment Minutes 40   Total Treatment Time (sum of timed and untimed services) 60       "

## 2024-03-19 ENCOUNTER — MYC MEDICAL ADVICE (OUTPATIENT)
Dept: ONCOLOGY | Facility: CLINIC | Age: 74
End: 2024-03-19
Payer: COMMERCIAL

## 2024-03-20 ENCOUNTER — ONCOLOGY VISIT (OUTPATIENT)
Dept: ONCOLOGY | Facility: CLINIC | Age: 74
End: 2024-03-20
Payer: COMMERCIAL

## 2024-03-20 VITALS
HEART RATE: 86 BPM | WEIGHT: 143.56 LBS | SYSTOLIC BLOOD PRESSURE: 110 MMHG | DIASTOLIC BLOOD PRESSURE: 66 MMHG | TEMPERATURE: 96.9 F | HEIGHT: 66 IN | BODY MASS INDEX: 23.07 KG/M2 | OXYGEN SATURATION: 100 %

## 2024-03-20 DIAGNOSIS — R53.83 FATIGUE, UNSPECIFIED TYPE: Primary | ICD-10-CM

## 2024-03-20 DIAGNOSIS — R52 GENERALIZED BODY ACHES: ICD-10-CM

## 2024-03-20 DIAGNOSIS — R68.83 CHILLS: ICD-10-CM

## 2024-03-20 DIAGNOSIS — M62.81 GENERALIZED MUSCLE WEAKNESS: ICD-10-CM

## 2024-03-20 DIAGNOSIS — J34.89 SINUS PRESSURE: ICD-10-CM

## 2024-03-20 DIAGNOSIS — F43.21 ADJUSTMENT DISORDER WITH DEPRESSED MOOD: ICD-10-CM

## 2024-03-20 DIAGNOSIS — I25.10 CORONARY ARTERY CALCIFICATION: ICD-10-CM

## 2024-03-20 LAB
ALBUMIN SERPL BCG-MCNC: 4.5 G/DL (ref 3.5–5.2)
ALBUMIN UR-MCNC: 30 MG/DL
ALP SERPL-CCNC: 54 U/L (ref 40–150)
ALT SERPL W P-5'-P-CCNC: 20 U/L (ref 0–50)
ANION GAP SERPL CALCULATED.3IONS-SCNC: 12 MMOL/L (ref 7–15)
APPEARANCE UR: ABNORMAL
AST SERPL W P-5'-P-CCNC: 24 U/L (ref 0–45)
BACTERIA #/AREA URNS HPF: ABNORMAL /HPF
BASOPHILS # BLD AUTO: 0.1 10E3/UL (ref 0–0.2)
BASOPHILS NFR BLD AUTO: 1 %
BILIRUB SERPL-MCNC: 0.3 MG/DL
BILIRUB UR QL STRIP: NEGATIVE
BUN SERPL-MCNC: 21 MG/DL (ref 8–23)
CALCIUM SERPL-MCNC: 10 MG/DL (ref 8.8–10.2)
CHLORIDE SERPL-SCNC: 107 MMOL/L (ref 98–107)
CHOLEST SERPL-MCNC: 233 MG/DL
COLOR UR AUTO: YELLOW
CREAT SERPL-MCNC: 1.06 MG/DL (ref 0.51–0.95)
DEPRECATED HCO3 PLAS-SCNC: 22 MMOL/L (ref 22–29)
EGFRCR SERPLBLD CKD-EPI 2021: 55 ML/MIN/1.73M2
EOSINOPHIL # BLD AUTO: 0.2 10E3/UL (ref 0–0.7)
EOSINOPHIL NFR BLD AUTO: 2 %
ERYTHROCYTE [DISTWIDTH] IN BLOOD BY AUTOMATED COUNT: 13.3 % (ref 10–15)
FASTING STATUS PATIENT QL REPORTED: NO
FLUAV RNA SPEC QL NAA+PROBE: NEGATIVE
FLUBV RNA RESP QL NAA+PROBE: NEGATIVE
GLUCOSE SERPL-MCNC: 99 MG/DL (ref 70–99)
GLUCOSE UR STRIP-MCNC: NEGATIVE MG/DL
HCT VFR BLD AUTO: 36.5 % (ref 35–47)
HDLC SERPL-MCNC: 72 MG/DL
HGB BLD-MCNC: 12.1 G/DL (ref 11.7–15.7)
HGB UR QL STRIP: ABNORMAL
IMM GRANULOCYTES # BLD: 0 10E3/UL
IMM GRANULOCYTES NFR BLD: 0 %
KETONES UR STRIP-MCNC: NEGATIVE MG/DL
LDLC SERPL CALC-MCNC: 116 MG/DL
LEUKOCYTE ESTERASE UR QL STRIP: ABNORMAL
LYMPHOCYTES # BLD AUTO: 3 10E3/UL (ref 0.8–5.3)
LYMPHOCYTES NFR BLD AUTO: 30 %
MCH RBC QN AUTO: 33.4 PG (ref 26.5–33)
MCHC RBC AUTO-ENTMCNC: 33.2 G/DL (ref 31.5–36.5)
MCV RBC AUTO: 101 FL (ref 78–100)
MONOCYTES # BLD AUTO: 0.6 10E3/UL (ref 0–1.3)
MONOCYTES NFR BLD AUTO: 6 %
MUCOUS THREADS #/AREA URNS LPF: PRESENT /LPF
NEUTROPHILS # BLD AUTO: 6 10E3/UL (ref 1.6–8.3)
NEUTROPHILS NFR BLD AUTO: 61 %
NITRATE UR QL: NEGATIVE
NONHDLC SERPL-MCNC: 161 MG/DL
NRBC # BLD AUTO: 0 10E3/UL
NRBC BLD AUTO-RTO: 0 /100
PH UR STRIP: 6 [PH] (ref 5–7)
PLATELET # BLD AUTO: 262 10E3/UL (ref 150–450)
POTASSIUM SERPL-SCNC: 4.5 MMOL/L (ref 3.4–5.3)
PROT SERPL-MCNC: 7.2 G/DL (ref 6.4–8.3)
RBC # BLD AUTO: 3.62 10E6/UL (ref 3.8–5.2)
RBC URINE: 21 /HPF
RSV RNA SPEC NAA+PROBE: NEGATIVE
SARS-COV-2 RNA RESP QL NAA+PROBE: NEGATIVE
SODIUM SERPL-SCNC: 141 MMOL/L (ref 135–145)
SP GR UR STRIP: 1.01 (ref 1–1.03)
SQUAMOUS EPITHELIAL: <1 /HPF
TRIGL SERPL-MCNC: 224 MG/DL
UROBILINOGEN UR STRIP-MCNC: NORMAL MG/DL
WBC # BLD AUTO: 9.8 10E3/UL (ref 4–11)
WBC URINE: 55 /HPF

## 2024-03-20 PROCEDURE — 80061 LIPID PANEL: CPT | Performed by: INTERNAL MEDICINE

## 2024-03-20 PROCEDURE — 80053 COMPREHEN METABOLIC PANEL: CPT | Performed by: INTERNAL MEDICINE

## 2024-03-20 PROCEDURE — 99214 OFFICE O/P EST MOD 30 MIN: CPT

## 2024-03-20 PROCEDURE — 85025 COMPLETE CBC W/AUTO DIFF WBC: CPT | Performed by: INTERNAL MEDICINE

## 2024-03-20 PROCEDURE — 81001 URINALYSIS AUTO W/SCOPE: CPT | Performed by: INTERNAL MEDICINE

## 2024-03-20 PROCEDURE — 87637 SARSCOV2&INF A&B&RSV AMP PRB: CPT | Performed by: INTERNAL MEDICINE

## 2024-03-20 PROCEDURE — 87086 URINE CULTURE/COLONY COUNT: CPT | Performed by: INTERNAL MEDICINE

## 2024-03-20 PROCEDURE — 36415 COLL VENOUS BLD VENIPUNCTURE: CPT | Performed by: INTERNAL MEDICINE

## 2024-03-20 RX ORDER — ESCITALOPRAM OXALATE 20 MG/1
20 TABLET ORAL DAILY
Qty: 90 TABLET | Refills: 3 | Status: SHIPPED | OUTPATIENT
Start: 2024-03-20 | End: 2024-04-25

## 2024-03-20 RX ORDER — LORAZEPAM 0.5 MG/1
0.5 TABLET ORAL
COMMUNITY
Start: 2024-02-12 | End: 2024-03-22

## 2024-03-20 ASSESSMENT — PAIN SCALES - GENERAL: PAINLEVEL: NO PAIN (0)

## 2024-03-20 NOTE — TELEPHONE ENCOUNTER
This RN attempted to call patient regarding ongoing concerns. LVM for patient to return call to clinic to arrange NP appointment, also sent [employer] message with times available and recommendations.  Noemi LOGAN

## 2024-03-20 NOTE — PROGRESS NOTES
Oncology Follow Up Visit: March 20, 2024    Oncologist: Dr. Sauer  PCP: KINA Gayle    Reason for Visit: Worsening symptoms     Diagnosis:  Left-sided ER-, HER2+ breast CA:  # Sep 2016 Presented w/ left axillary mass; staging multicentric T4 lesion w/ geraldine involvement; biopsy w/ ER-, HER2+ breast cancer  # Sep 2016 - Jan 2017 Neoadjuvant AC x 4 cycles/TH x 4 cycles.  # Feb 2017 Bilateral mastectomy no with geraldine evaluation; complete path CR from invasive cancer; 7 mm DCIS residual.  # Apr 2017 - May 2017 Adjuvant radiation to left chestwall / axilla.  # Apr 2017 - Dec 2017 Adjuvant Herceptin.     Bladder cancer: ([id number])  # Oct 2020 Presented w/ dysuria.  # Feb 2021 Cytoscopy w/ muscle-invasive high grade urothelial cancer.  # Mar 2021 TURBT.  # Apr 2021 Neoadjuvant cisplatin/gem split dose complicated by TAMIR.  # May 2021 Neoadjuvant carboplatin/gem.  # Jun 2021 Radical cystectomy w/ ileal conduit; path high grade urothelial carcinoma muscle invasive; margins negs; nodes negative.  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 Ongoing observation.  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      Interval History:  Pt states she was treated with amoxicillin for a sinus infection in early March with partial resolution of symptoms. She continues to have lingering headaches, body aches, weakness, fatigue, sinus congestion with clear snot that is worse in the morning, sore throat which is always present d/t issues with fungal infection of her esophagus, sensation of chills and shivers off and on but does not have a thermometer to check for fever, loose stools but not diarrhea which also has been better the last few days, decreased appetite, nausea but no vomiting, intermittent abdominal cramps, and gas. No longer has a cough like she did in the beginning of the  "month. Also states her urine from her ileostomy is clear not cloudy, she changed the bag this morning. She is taking tylenol every couple of hours and using nasal saline rinses, Mucinex was helpful in the beginning but no longer taking.     Patient denies vision or hearing changes, chest pain or discomfort, palpitations, new swellings, cough, abdominal pain, constipation, blood in stool or urine, numbness, tingling, issues with sleep or mood, lumps, bumps, rashes, skin lesions, bleeding or bruising issues.     Physical Exam:  /66 (BP Location: Right arm, Patient Position: Sitting, Cuff Size: Adult Regular)   Pulse 86   Temp 96.9  F (36.1  C) (Temporal)   Ht 1.676 m (5' 6\")   Wt 65.1 kg (143 lb 9 oz)   SpO2 100%   BMI 23.17 kg/m       BP Readings from Last 6 Encounters:   03/20/24 110/66   01/28/24 (!) 160/87   12/04/23 120/70   11/30/23 139/78   10/29/23 (!) 141/78   10/12/23 118/68     Wt Readings from Last 10 Encounters:   03/20/24 65.1 kg (143 lb 9 oz)   02/13/24 63.5 kg (140 lb)   01/28/24 63.5 kg (140 lb)   12/04/23 61.2 kg (135 lb)   11/30/23 65.8 kg (145 lb)   11/28/23 61.2 kg (135 lb)   10/29/23 61.2 kg (135 lb)   10/26/23 61.2 kg (135 lb)   10/12/23 61.2 kg (135 lb)   10/10/23 61.2 kg (135 lb)      Constitutional: No acute distress, pleasant, appropriately groomed.   ENT: PERRLA, sclera without erythema.   Neck: Trachea midline, no adenopathy.   Resp: CTA, adequate depth and rate of respirations.   Cardiac: S1/S2, RRR, no murmurs.   Abdomen: BS active, abdomen soft and non-tender. No masses or organomegaly. Ileostomy bag with clear, yellow-tinged urine.  MS:  5/5 muscle strength, adequate ROM.   Skin: No rashes, lesions, or wounds on exposed skin.  Neuro: A/O x 4, sensation intact.   Lymph: No palpable anterior/posterior cervical, axillary, or supraclavicular nodes.   Psych: Appropriate mentation and affect.    Laboratory Results:   Results for orders placed or performed in visit on 03/20/24 "   UA Macroscopic with reflex to Microscopic and Culture - Lab Collect     Status: Abnormal    Specimen: Nephrostomy, Right; Urine   Result Value Ref Range    Color Urine Yellow Colorless, Straw, Light Yellow, Yellow    Appearance Urine Slightly Cloudy (A) Clear    Glucose Urine Negative Negative mg/dL    Bilirubin Urine Negative Negative    Ketones Urine Negative Negative mg/dL    Specific Gravity Urine 1.014 1.003 - 1.035    Blood Urine Moderate (A) Negative    pH Urine 6.0 5.0 - 7.0    Protein Albumin Urine 30 (A) Negative mg/dL    Urobilinogen Urine Normal Normal, 2.0 mg/dL    Nitrite Urine Negative Negative    Leukocyte Esterase Urine Trace (A) Negative    Bacteria Urine Few (A) None Seen /HPF    Mucus Urine Present (A) None Seen /LPF    RBC Urine 21 (H) <=2 /HPF    WBC Urine 55 (H) <=5 /HPF    Squamous Epithelials Urine <1 <=1 /HPF    Narrative    Urine Culture ordered based on laboratory criteria   CBC with platelets and differential     Status: Abnormal   Result Value Ref Range    WBC Count 9.8 4.0 - 11.0 10e3/uL    RBC Count 3.62 (L) 3.80 - 5.20 10e6/uL    Hemoglobin 12.1 11.7 - 15.7 g/dL    Hematocrit 36.5 35.0 - 47.0 %     (H) 78 - 100 fL    MCH 33.4 (H) 26.5 - 33.0 pg    MCHC 33.2 31.5 - 36.5 g/dL    RDW 13.3 10.0 - 15.0 %    Platelet Count 262 150 - 450 10e3/uL    % Neutrophils 61 %    % Lymphocytes 30 %    % Monocytes 6 %    % Eosinophils 2 %    % Basophils 1 %    % Immature Granulocytes 0 %    NRBCs per 100 WBC 0 <1 /100    Absolute Neutrophils 6.0 1.6 - 8.3 10e3/uL    Absolute Lymphocytes 3.0 0.8 - 5.3 10e3/uL    Absolute Monocytes 0.6 0.0 - 1.3 10e3/uL    Absolute Eosinophils 0.2 0.0 - 0.7 10e3/uL    Absolute Basophils 0.1 0.0 - 0.2 10e3/uL    Absolute Immature Granulocytes 0.0 <=0.4 10e3/uL    Absolute NRBCs 0.0 10e3/uL   CBC with Platelets & Differential     Status: Abnormal    Narrative    The following orders were created for panel order CBC with Platelets & Differential.  Procedure                                Abnormality         Status                     ---------                               -----------         ------                     CBC with platelets and d...[[id number]]  Abnormal            Final result                 Please view results for these tests on the individual orders.   I reviewed the above labs today.    Imaging:  XR Chest 2 Views  Narrative: EXAM: XR CHEST 2 VIEWS  LOCATION: [facility]  DATE: 1/28/2024    INDICATION: shortness of breath  COMPARISON: Chest CT 11/27/2023  Impression: IMPRESSION: Stable size of cardiomediastinal silhouette with right chest port tip overlying the superior vena cava. No focal airspace consolidation, pleural effusion or pneumothorax. Right shoulder arthroplasty again noted.  I reviewed the above imaging report today.      Assessment and Plan:   #Bladder cancer: ([id number]) - s/p cystecomy w/ileal conduit, completed chemotherapy.  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 On observation currently.   - Seen today for a variety of vague symptoms (see HPI). Will check CBC, CMP, urinalysis, and respiratory panel to r/o infection. Will follow-up with patient via [employer] regarding results. In the interim, advised patient to continue symptom management with tylenol, nasal spray, mucinex and increase fluid intake to a minimum of 6-8 8 oz glass of noncaffeinated fluids daily.   - Upcoming follow-up appointment with Dr. Sauer in June.       #Depression: Seen 2/13 for worsening depressive symptoms after [place]. She was restarted on her Lexapro 20 mg daily. Pt states her mood is much improved. Refill provided. Continue to monitor.              Geneva GONZALEZ, CNP

## 2024-03-20 NOTE — LETTER
3/20/2024         RE: Michaela Dorman  [address]  [place] [address]        Dear Colleague,    Thank you for referring your patient, Michaela Dorman, to the [facility]. Please see a copy of my visit note below.    Oncology Follow Up Visit: March 20, 2024    Oncologist: Dr. Sauer  PCP: KINA Gayle    Reason for Visit: Worsening symptoms     Diagnosis:  Left-sided ER-, HER2+ breast CA:  # Sep 2016 Presented w/ left axillary mass; staging multicentric T4 lesion w/ geraldine involvement; biopsy w/ ER-, HER2+ breast cancer  # Sep 2016 - Jan 2017 Neoadjuvant AC x 4 cycles/TH x 4 cycles.  # Feb 2017 Bilateral mastectomy no with geraldine evaluation; complete path CR from invasive cancer; 7 mm DCIS residual.  # Apr 2017 - May 2017 Adjuvant radiation to left chestwall / axilla.  # Apr 2017 - Dec 2017 Adjuvant Herceptin.     Bladder cancer: ([id number])  # Oct 2020 Presented w/ dysuria.  # Feb 2021 Cytoscopy w/ muscle-invasive high grade urothelial cancer.  # Mar 2021 TURBT.  # Apr 2021 Neoadjuvant cisplatin/gem split dose complicated by TAMIR.  # May 2021 Neoadjuvant carboplatin/gem.  # Jun 2021 Radical cystectomy w/ ileal conduit; path high grade urothelial carcinoma muscle invasive; margins negs; nodes negative.  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 Ongoing observation.  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      Interval History:  Pt states she was treated with amoxicillin for a sinus infection in early March with partial resolution of symptoms. She continues to have lingering headaches, body aches, weakness, fatigue, sinus congestion with clear snot that is worse in the morning, sore throat which is always present d/t issues with fungal infection of her esophagus, sensation of chills and shivers off and on but does not  "have a thermometer to check for fever, loose stools but not diarrhea which also has been better the last few days, decreased appetite, nausea but no vomiting, intermittent abdominal cramps, and gas. No longer has a cough like she did in the beginning of the month. Also states her urine from her ileostomy is clear not cloudy, she changed the bag this morning. She is taking tylenol every couple of hours and using nasal saline rinses, Mucinex was helpful in the beginning but no longer taking.     Patient denies vision or hearing changes, chest pain or discomfort, palpitations, new swellings, cough, abdominal pain, constipation, blood in stool or urine, numbness, tingling, issues with sleep or mood, lumps, bumps, rashes, skin lesions, bleeding or bruising issues.     Physical Exam:  /66 (BP Location: Right arm, Patient Position: Sitting, Cuff Size: Adult Regular)   Pulse 86   Temp 96.9  F (36.1  C) (Temporal)   Ht 1.676 m (5' 6\")   Wt 65.1 kg (143 lb 9 oz)   SpO2 100%   BMI 23.17 kg/m       BP Readings from Last 6 Encounters:   03/20/24 110/66   01/28/24 (!) 160/87   12/04/23 120/70   11/30/23 139/78   10/29/23 (!) 141/78   10/12/23 118/68     Wt Readings from Last 10 Encounters:   03/20/24 65.1 kg (143 lb 9 oz)   02/13/24 63.5 kg (140 lb)   01/28/24 63.5 kg (140 lb)   12/04/23 61.2 kg (135 lb)   11/30/23 65.8 kg (145 lb)   11/28/23 61.2 kg (135 lb)   10/29/23 61.2 kg (135 lb)   10/26/23 61.2 kg (135 lb)   10/12/23 61.2 kg (135 lb)   10/10/23 61.2 kg (135 lb)      Constitutional: No acute distress, pleasant, appropriately groomed.   ENT: PERRLA, sclera without erythema.   Neck: Trachea midline, no adenopathy.   Resp: CTA, adequate depth and rate of respirations.   Cardiac: S1/S2, RRR, no murmurs.   Abdomen: BS active, abdomen soft and non-tender. No masses or organomegaly. Ileostomy bag with clear, yellow-tinged urine.  MS:  5/5 muscle strength, adequate ROM.   Skin: No rashes, lesions, or wounds on exposed " skin.  Neuro: A/O x 4, sensation intact.   Lymph: No palpable anterior/posterior cervical, axillary, or supraclavicular nodes.   Psych: Appropriate mentation and affect.    Laboratory Results:   Results for orders placed or performed in visit on 03/20/24   UA Macroscopic with reflex to Microscopic and Culture - Lab Collect     Status: Abnormal    Specimen: Nephrostomy, Right; Urine   Result Value Ref Range    Color Urine Yellow Colorless, Straw, Light Yellow, Yellow    Appearance Urine Slightly Cloudy (A) Clear    Glucose Urine Negative Negative mg/dL    Bilirubin Urine Negative Negative    Ketones Urine Negative Negative mg/dL    Specific Gravity Urine 1.014 1.003 - 1.035    Blood Urine Moderate (A) Negative    pH Urine 6.0 5.0 - 7.0    Protein Albumin Urine 30 (A) Negative mg/dL    Urobilinogen Urine Normal Normal, 2.0 mg/dL    Nitrite Urine Negative Negative    Leukocyte Esterase Urine Trace (A) Negative    Bacteria Urine Few (A) None Seen /HPF    Mucus Urine Present (A) None Seen /LPF    RBC Urine 21 (H) <=2 /HPF    WBC Urine 55 (H) <=5 /HPF    Squamous Epithelials Urine <1 <=1 /HPF    Narrative    Urine Culture ordered based on laboratory criteria   CBC with platelets and differential     Status: Abnormal   Result Value Ref Range    WBC Count 9.8 4.0 - 11.0 10e3/uL    RBC Count 3.62 (L) 3.80 - 5.20 10e6/uL    Hemoglobin 12.1 11.7 - 15.7 g/dL    Hematocrit 36.5 35.0 - 47.0 %     (H) 78 - 100 fL    MCH 33.4 (H) 26.5 - 33.0 pg    MCHC 33.2 31.5 - 36.5 g/dL    RDW 13.3 10.0 - 15.0 %    Platelet Count 262 150 - 450 10e3/uL    % Neutrophils 61 %    % Lymphocytes 30 %    % Monocytes 6 %    % Eosinophils 2 %    % Basophils 1 %    % Immature Granulocytes 0 %    NRBCs per 100 WBC 0 <1 /100    Absolute Neutrophils 6.0 1.6 - 8.3 10e3/uL    Absolute Lymphocytes 3.0 0.8 - 5.3 10e3/uL    Absolute Monocytes 0.6 0.0 - 1.3 10e3/uL    Absolute Eosinophils 0.2 0.0 - 0.7 10e3/uL    Absolute Basophils 0.1 0.0 - 0.2 10e3/uL     Absolute Immature Granulocytes 0.0 <=0.4 10e3/uL    Absolute NRBCs 0.0 10e3/uL   CBC with Platelets & Differential     Status: Abnormal    Narrative    The following orders were created for panel order CBC with Platelets & Differential.  Procedure                               Abnormality         Status                     ---------                               -----------         ------                     CBC with platelets and d...[[id number]]  Abnormal            Final result                 Please view results for these tests on the individual orders.   I reviewed the above labs today.    Imaging:  XR Chest 2 Views  Narrative: EXAM: XR CHEST 2 VIEWS  LOCATION: [facility]  DATE: 1/28/2024    INDICATION: shortness of breath  COMPARISON: Chest CT 11/27/2023  Impression: IMPRESSION: Stable size of cardiomediastinal silhouette with right chest port tip overlying the superior vena cava. No focal airspace consolidation, pleural effusion or pneumothorax. Right shoulder arthroplasty again noted.  I reviewed the above imaging report today.      Assessment and Plan:   #Bladder cancer: ([id number]) - s/p cystecomy w/ileal conduit, completed chemotherapy.  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 On observation currently.   - Seen today for a variety of vague symptoms (see HPI). Will check CBC, CMP, urinalysis, and respiratory panel to r/o infection. Will follow-up with patient via MyChart regarding results. In the interim, advised patient to continue symptom management with tylenol, nasal spray, mucinex and increase fluid intake to a minimum of 6-8 8 oz glass of noncaffeinated fluids daily.   - Upcoming follow-up appointment with Dr. Sauer in June.       #Depression: Seen 2/13 for worsening depressive symptoms after [place] medication [place]. She was restarted on her Lexapro 20 mg daily. Pt states her mood is  much improved. Refill provided. Continue to monitor.              Geneva GONZALEZ, CNP      Again, thank you for allowing me to participate in the care of your patient.        Sincerely,        LISA Hinojosa CNP

## 2024-03-21 ENCOUNTER — MYC MEDICAL ADVICE (OUTPATIENT)
Dept: CARDIOLOGY | Facility: CLINIC | Age: 74
End: 2024-03-21
Payer: COMMERCIAL

## 2024-03-21 DIAGNOSIS — E78.5 HYPERLIPIDEMIA LDL GOAL <130: Primary | ICD-10-CM

## 2024-03-21 DIAGNOSIS — F43.21 ADJUSTMENT DISORDER WITH DEPRESSED MOOD: Primary | ICD-10-CM

## 2024-03-21 DIAGNOSIS — I25.10 CORONARY ARTERY CALCIFICATION: ICD-10-CM

## 2024-03-21 RX ORDER — EZETIMIBE 10 MG/1
10 TABLET ORAL DAILY
Qty: 90 TABLET | Refills: 3 | Status: SHIPPED | OUTPATIENT
Start: 2024-03-21

## 2024-03-21 RX ORDER — ATORVASTATIN CALCIUM 40 MG/1
80 TABLET, FILM COATED ORAL DAILY
Qty: 180 TABLET | Refills: 3 | Status: SHIPPED | OUTPATIENT
Start: 2024-03-21 | End: 2024-06-12

## 2024-03-21 NOTE — NURSING NOTE
DISCHARGE PLAN:  Next appointments: See patient instruction section  Departure Mode: Ambulatory  Accompanied by: self   minutes for medical assistant discharge (face to face time)     Michaela Dorman is here today for onc follow up.  Patient was not seen by medical assistant at time of the appointment.   Appointments scheduled for follow up with Camacho previously. Appt with Ariane cancelled. Pt went to lab today.    See patient instructions and Oncologist's Progress note for further details. Questions and concerns addressed to patient's satisfaction. Patient verbalized and demonstrated understanding of plan.  Contact information provided and patient is encouraged to call with any that arise in the interim of care.    Lashell PRESCOTT [facility]  [phone]  3/21/2024, 9:24 AM

## 2024-03-21 NOTE — TELEPHONE ENCOUNTER
Future Office Visit:      Requested Prescriptions   Pending Prescriptions Disp Refills    LORazepam (ATIVAN) 0.5 MG tablet [Pharmacy Med Name: LORAZEPAM 0.5MG TABLET] 25 tablet 3     Sig: TAKE 1 TABLET (0.5 MG) BY MOUTH EVERY 4 HOURS AS NEEDED FOR ANXIETY, NAUSEA OR SLEEP       There is no refill protocol information for this order          Unable to fill per RN protocol, will forward to provider for review.         Carol Rollins RN on 3/21/2024 at 3:24 PM

## 2024-03-21 NOTE — TELEPHONE ENCOUNTER
Date: 3/21/2024    Time of Call: 10:24 AM     Diagnosis:  hyperlipidemia     [ TORB ] Ordering provider: Dr. Craig  Order: increase atorvastatin to 80 mg daily, start ezetemibe 10 mg daily     Order received by: Vanessa Nails RN     Follow-up/additional notes: MyChart sent to patient

## 2024-03-22 ENCOUNTER — MYC MEDICAL ADVICE (OUTPATIENT)
Dept: ONCOLOGY | Facility: CLINIC | Age: 74
End: 2024-03-22
Payer: COMMERCIAL

## 2024-03-22 ENCOUNTER — TELEPHONE (OUTPATIENT)
Dept: CARDIOLOGY | Facility: CLINIC | Age: 74
End: 2024-03-22
Payer: COMMERCIAL

## 2024-03-22 ENCOUNTER — MYC MEDICAL ADVICE (OUTPATIENT)
Dept: CARDIOLOGY | Facility: CLINIC | Age: 74
End: 2024-03-22
Payer: COMMERCIAL

## 2024-03-22 LAB — BACTERIA UR CULT: NORMAL

## 2024-03-22 RX ORDER — LORAZEPAM 0.5 MG/1
TABLET ORAL
Qty: 25 TABLET | Refills: 0 | Status: SHIPPED | OUTPATIENT
Start: 2024-03-22 | End: 2024-06-03

## 2024-03-26 ENCOUNTER — MYC MEDICAL ADVICE (OUTPATIENT)
Dept: ONCOLOGY | Facility: CLINIC | Age: 74
End: 2024-03-26
Payer: COMMERCIAL

## 2024-03-28 ENCOUNTER — MYC MEDICAL ADVICE (OUTPATIENT)
Dept: ONCOLOGY | Facility: CLINIC | Age: 74
End: 2024-03-28
Payer: COMMERCIAL

## 2024-03-28 ENCOUNTER — TELEPHONE (OUTPATIENT)
Dept: ONCOLOGY | Facility: CLINIC | Age: 74
End: 2024-03-28
Payer: COMMERCIAL

## 2024-03-28 DIAGNOSIS — G89.4 CHRONIC PAIN SYNDROME: ICD-10-CM

## 2024-03-28 DIAGNOSIS — F43.21 ADJUSTMENT DISORDER WITH DEPRESSED MOOD: ICD-10-CM

## 2024-03-28 RX ORDER — OXYCODONE HYDROCHLORIDE 5 MG/1
5 TABLET ORAL EVERY 6 HOURS PRN
Qty: 12 TABLET | Refills: 0 | OUTPATIENT
Start: 2024-03-28

## 2024-03-28 RX ORDER — LORAZEPAM 0.5 MG/1
TABLET ORAL
Qty: 25 TABLET | Refills: 0 | OUTPATIENT
Start: 2024-03-28

## 2024-03-28 RX ORDER — OXYCODONE AND ACETAMINOPHEN 5; 325 MG/1; MG/1
1 TABLET ORAL EVERY 6 HOURS PRN
Qty: 40 TABLET | Refills: 0 | OUTPATIENT
Start: 2024-03-28

## 2024-03-28 NOTE — PROGRESS NOTES
CLINICAL NUTRITION SERVICES     Reason for Contact: Questions from Oncology Distress Screening  1. How concerned are you about your ability to eat? :  8  2. How concerned are you about unintended weight loss or your current weight? : 0    Action: RD called patient indicating reason for phone call. Left a VM with a return call back number.     Follow up: Wait for a return phone call.    Alix Varner RD, LD  [place] office [phone]

## 2024-03-28 NOTE — PROGRESS NOTES
Oncology Follow Up Visit: April 2, 2024    Oncologist: Dr. Sauer  PCP: KINA Gayle    Reason for Visit: Self-palpated lump on L) breast     Diagnosis:  Left-sided ER-, HER2+ breast CA:  # Sep 2016 Presented w/ left axillary mass; staging multicentric T4 lesion w/ geraldine involvement; biopsy w/ ER-, HER2+ breast cancer  # Sep 2016 - Jan 2017 Neoadjuvant AC x 4 cycles/TH x 4 cycles.  # Feb 2017 Bilateral mastectomy no with geraldine evaluation; complete path CR from invasive cancer; 7 mm DCIS residual.  # Apr 2017 - May 2017 Adjuvant radiation to left chestwall / axilla.  # Apr 2017 - Dec 2017 Adjuvant Herceptin.     Bladder cancer: ([id number])  # Oct 2020 Presented w/ dysuria.  # Feb 2021 Cytoscopy w/ muscle-invasive high grade urothelial cancer.  # Mar 2021 TURBT.  # Apr 2021 Neoadjuvant cisplatin/gem split dose complicated by TAMIR.  # May 2021 Neoadjuvant carboplatin/gem.  # Jun 2021 Radical cystectomy w/ ileal conduit; path high grade urothelial carcinoma muscle invasive; margins negs; nodes negative.  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 Ongoing observation.  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    Interval History:  Pt is seen in clinic today for new lump she self-palpated to her left breast. She first noticed it after our visit on 3/20. She thinks it has grown in size but does admit to some anxiety regarding possible return of cancer. It is not painful. There is no redness or swelling. She think she can visualize it in the mirror. No other skin changes to the area. She is diligent with breast exams, checking every few weeks. She has a poor appetite since her breast cancer treatment but no unintended weight loss. She exercises 6 days/week. She feels otherwise well and healthy. Previous reported symptoms of body aches, fatigue, sinus congestion, and  sore throat have resolved.     Patient denies vision or hearing changes, chest pain or discomfort, palpitations, new swellings, cough, abdominal pain, constipation, blood in stool or urine, numbness, tingling, issues with sleep or mood, rashes, skin lesions, bleeding or bruising issues.     Physical Exam:  /62   Pulse 96   Temp 98.1  F (36.7  C) (Temporal)   Resp 16   Wt 66.2 kg (146 lb)   SpO2 95%   BMI 23.57 kg/m       BP Readings from Last 6 Encounters:   04/02/24 108/62   03/20/24 110/66   01/28/24 (!) 160/87   12/04/23 120/70   11/30/23 139/78   10/29/23 (!) 141/78     Wt Readings from Last 10 Encounters:   04/02/24 66.2 kg (146 lb)   03/20/24 65.1 kg (143 lb 9 oz)   02/13/24 63.5 kg (140 lb)   01/28/24 63.5 kg (140 lb)   12/04/23 61.2 kg (135 lb)   11/30/23 65.8 kg (145 lb)   11/28/23 61.2 kg (135 lb)   10/29/23 61.2 kg (135 lb)   10/26/23 61.2 kg (135 lb)   10/12/23 61.2 kg (135 lb)      Constitutional: No acute distress, pleasant, appropriately groomed.   ENT: PERRLA, sclera without erythema.   Neck: Trachea midline, no adenopathy.   Resp: CTA, adequate depth and rate of respirations.   Cardiac: S1/S2, RRR, no murmurs.   Breasts: s/p bilateral mastectomy. Area of concern best palpated in supine position, ~nickel size, flat, immobile, and firm. Located mid breast just below surgical incision on L) side. No erythema, tenderness, or ulceration.  Abdomen: BS active, abdomen soft and non-tender. No masses or organomegaly. Ileostomy bag with clear, yellow-tinged urine.  MS:  5/5 muscle strength, adequate ROM.   Skin: No rashes, lesions, or wounds on exposed skin.  Neuro: A/O x 4, sensation intact.   Lymph: No palpable anterior/posterior cervical, axillary, or supraclavicular nodes.   Psych: Appropriate mentation and affect.    Imaging:  XR Chest 2 Views  Narrative: EXAM: XR CHEST 2 VIEWS  LOCATION: [facility]  DATE: 1/28/2024    INDICATION: shortness of  breath  COMPARISON: Chest CT 11/27/2023  Impression: IMPRESSION: Stable size of cardiomediastinal silhouette with right chest port tip overlying the superior vena cava. No focal airspace consolidation, pleural effusion or pneumothorax. Right shoulder arthroplasty again noted.  I reviewed the above imaging report today.      Assessment and Plan:   #Bladder cancer: ([id number]) - s/p cystecomy w/ileal conduit, completed chemotherapy.  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 On observation currently.   - Seen 3/20 for a variety of constitutional symptoms. Blood work, respiratory panel, and urinalysis did not indicate active infection. These have resolved and patient is feeling much better.   - Upcoming follow-up appointment with Dr. Sauer in June.     #L) breast lump  - Hx of breast cancer in 2016 - s/p chemoradiation and bilateral mastectomy with complete pathological response  - New self-palpated lump to left breast.  - Will obtain ultrasound.  - Follow-up after results are finalized.    #Depression: Seen 2/13 for worsening depressive symptoms after running out of her medication while [place] of state. She was restarted on her Lexapro 20 mg daily. Mood is improved but now anxious given new breast finding. Continue to monitor.              Geneva Knowles - LISA, CNP

## 2024-03-28 NOTE — TELEPHONE ENCOUNTER
REFILL REQUEST:  Last office visit: 3/20/2024 with prescribing provider:  Geneva Knowles CNP   Future Office Visit:  04/02/24 Geneva Knowles CNP    Requested Prescriptions   Pending Prescriptions Disp Refills    oxyCODONE (ROXICODONE) 5 MG tablet [Pharmacy Med Name: OXYCODONE 5MG TABLET] 12 tablet 0     Sig: TAKE 1 TABLET (5 MG) BY MOUTH EVERY 6 HOURS AS NEEDED       There is no refill protocol information for this order       LORazepam (ATIVAN) 0.5 MG tablet 25 tablet 0     Sig: TAKE 1 TABLET (0.5 MG) BY MOUTH EVERY 4 HOURS AS NEEDED FOR ANXIETY, NAUSEA OR SLEEP       There is no refill protocol information for this order          Request sent to Dr. Sauer to review.    Noemi Garcia RN on 3/28/2024 at 11:30 AM

## 2024-04-01 ENCOUNTER — MEDICAL CORRESPONDENCE (OUTPATIENT)
Dept: HEALTH INFORMATION MANAGEMENT | Facility: CLINIC | Age: 74
End: 2024-04-01

## 2024-04-02 ENCOUNTER — MYC MEDICAL ADVICE (OUTPATIENT)
Dept: ONCOLOGY | Facility: CLINIC | Age: 74
End: 2024-04-02

## 2024-04-02 ENCOUNTER — ONCOLOGY VISIT (OUTPATIENT)
Dept: ONCOLOGY | Facility: CLINIC | Age: 74
End: 2024-04-02
Payer: COMMERCIAL

## 2024-04-02 VITALS
OXYGEN SATURATION: 95 % | TEMPERATURE: 98.1 F | RESPIRATION RATE: 16 BRPM | WEIGHT: 146 LBS | DIASTOLIC BLOOD PRESSURE: 62 MMHG | BODY MASS INDEX: 23.57 KG/M2 | HEART RATE: 96 BPM | SYSTOLIC BLOOD PRESSURE: 108 MMHG

## 2024-04-02 DIAGNOSIS — N63.20 MASS OF LEFT BREAST, UNSPECIFIED QUADRANT: ICD-10-CM

## 2024-04-02 DIAGNOSIS — Z17.1 MALIGNANT NEOPLASM OF UPPER-OUTER QUADRANT OF LEFT BREAST IN FEMALE, ESTROGEN RECEPTOR NEGATIVE (H): Primary | ICD-10-CM

## 2024-04-02 DIAGNOSIS — C50.412 MALIGNANT NEOPLASM OF UPPER-OUTER QUADRANT OF LEFT BREAST IN FEMALE, ESTROGEN RECEPTOR NEGATIVE (H): Primary | ICD-10-CM

## 2024-04-02 DIAGNOSIS — Z93.2 ILEOSTOMY STATUS (H): ICD-10-CM

## 2024-04-02 DIAGNOSIS — C67.9 UROTHELIAL CARCINOMA OF BLADDER WITH INVASION OF MUSCLE (H): ICD-10-CM

## 2024-04-02 PROCEDURE — 99213 OFFICE O/P EST LOW 20 MIN: CPT

## 2024-04-02 ASSESSMENT — PAIN SCALES - GENERAL: PAINLEVEL: MILD PAIN (3)

## 2024-04-02 NOTE — Clinical Note
4/2/2024         RE: Michaela Dorman  [address]  [facility] [address]        Dear Colleague,    Thank you for referring your patient, Michaela Dorman, to the [facility]. Please see a copy of my visit note below.    Oncology Follow Up Visit: April 2, 2024    Oncologist: Dr. Sauer  PCP: KINA Gayle    Reason for Visit: Self-palpated lump on L) breast     Diagnosis:  Left-sided ER-, HER2+ breast CA:  # Sep 2016 Presented w/ left axillary mass; staging multicentric T4 lesion w/ geraldine involvement; biopsy w/ ER-, HER2+ breast cancer  # Sep 2016 - Jan 2017 Neoadjuvant AC x 4 cycles/TH x 4 cycles.  # Feb 2017 Bilateral mastectomy no with geraldine evaluation; complete path CR from invasive cancer; 7 mm DCIS residual.  # Apr 2017 - May 2017 Adjuvant radiation to left chestwall / axilla.  # Apr 2017 - Dec 2017 Adjuvant Herceptin.     Bladder cancer: ([id number])  # Oct 2020 Presented w/ dysuria.  # Feb 2021 Cytoscopy w/ muscle-invasive high grade urothelial cancer.  # Mar 2021 TURBT.  # Apr 2021 Neoadjuvant cisplatin/gem split dose complicated by TAMIR.  # May 2021 Neoadjuvant carboplatin/gem.  # Jun 2021 Radical cystectomy w/ ileal conduit; path high grade urothelial carcinoma muscle invasive; margins negs; nodes negative.  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 Ongoing observation.  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      Interval History:  Pt is seen in clinic today for new lump she self-palpated to her left breast.       ***Pt states she was treated with amoxicillin for a sinus infection in early March with partial resolution of symptoms. She continues to have lingering headaches, body aches, weakness, fatigue, sinus congestion with clear snot that is worse in the morning, sore throat which is always present d/t  issues with fungal infection of her esophagus, sensation of chills and shivers off and on but does not have a thermometer to check for fever, loose stools but not diarrhea which also has been better the last few days, decreased appetite, nausea but no vomiting, intermittent abdominal cramps, and gas. No longer has a cough like she did in the beginning of the month. Also states her urine from her ileostomy is clear not cloudy, she changed the bag this morning. She is taking tylenol every couple of hours and using nasal saline rinses, Mucinex was helpful in the beginning but no longer taking.     Patient denies vision or hearing changes, chest pain or discomfort, palpitations, new swellings, cough, abdominal pain, constipation, blood in stool or urine, numbness, tingling, issues with sleep or mood, lumps, bumps, rashes, skin lesions, bleeding or bruising issues.     Physical Exam:  There were no vitals taken for this visit.     BP Readings from Last 6 Encounters:   03/20/24 110/66   01/28/24 (!) 160/87   12/04/23 120/70   11/30/23 139/78   10/29/23 (!) 141/78   10/12/23 118/68     Wt Readings from Last 10 Encounters:   03/20/24 65.1 kg (143 lb 9 oz)   02/13/24 63.5 kg (140 lb)   01/28/24 63.5 kg (140 lb)   12/04/23 61.2 kg (135 lb)   11/30/23 65.8 kg (145 lb)   11/28/23 61.2 kg (135 lb)   10/29/23 61.2 kg (135 lb)   10/26/23 61.2 kg (135 lb)   10/12/23 61.2 kg (135 lb)   10/10/23 61.2 kg (135 lb)      Constitutional: No acute distress, pleasant, appropriately groomed.   ENT: PERRLA, sclera without erythema.   Neck: Trachea midline, no adenopathy.   Resp: CTA, adequate depth and rate of respirations.   Cardiac: S1/S2, RRR, no murmurs.   Breasts: s/p bilateral mastectomy.   Abdomen: BS active, abdomen soft and non-tender. No masses or organomegaly. Ileostomy bag with clear, yellow-tinged urine.  MS:  5/5 muscle strength, adequate ROM.   Skin: No rashes, lesions, or wounds on exposed skin.  Neuro: A/O x 4, sensation  intact.   Lymph: No palpable anterior/posterior cervical, axillary, or supraclavicular nodes.   Psych: Appropriate mentation and affect.    Laboratory Results:   No results found for any visits on 04/02/24.  I reviewed the above labs today.    Imaging:  XR Chest 2 Views  Narrative: EXAM: XR CHEST 2 VIEWS  LOCATION: [facility]  DATE: 1/28/2024    INDICATION: shortness of breath  COMPARISON: Chest CT 11/27/2023  Impression: IMPRESSION: Stable size of cardiomediastinal silhouette with right chest port tip overlying the superior vena cava. No focal airspace consolidation, pleural effusion or pneumothorax. Right shoulder arthroplasty again noted.  I reviewed the above imaging report today.      Assessment and Plan:   #Bladder cancer: ([id number]) - s/p cystecomy w/ileal conduit, completed chemotherapy.  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 On observation currently.   - Seen 3/20 for a variety of constitutional symptoms. Blood work, respiratory panel, and urinalysis did not indicate active infection. These have resolved and patient is feeling much better.   - Upcoming follow-up appointment with Dr. Sauer in June.     #L) breast lump  - Hx of breast cancer in 2016 - s/p chemoradiation and bilateral mastectomy with complete pathological response  - New self-palpated lump to left breast  - Will obtain ultrasound       #Depression: Seen 2/13 for worsening depressive symptoms after running out of her medication while [place] of state. She was restarted on her Lexapro 20 mg daily. Mood is improved but now anxious given new breast finding. Continue to monitor.              Geneva GONZALEZ, CNP      Again, thank you for allowing me to participate in the care of your patient.        Sincerely,        LISA Hinojosa CNP

## 2024-04-02 NOTE — NURSING NOTE
Chief Complaint   Patient presents with    Breast Mass     Would like this looked at on the Left breast. See mychart message.

## 2024-04-06 ENCOUNTER — MYC MEDICAL ADVICE (OUTPATIENT)
Dept: ONCOLOGY | Facility: CLINIC | Age: 74
End: 2024-04-06
Payer: COMMERCIAL

## 2024-04-09 ENCOUNTER — MYC MEDICAL ADVICE (OUTPATIENT)
Dept: ONCOLOGY | Facility: CLINIC | Age: 74
End: 2024-04-09
Payer: COMMERCIAL

## 2024-04-09 ENCOUNTER — HOSPITAL ENCOUNTER (OUTPATIENT)
Dept: ULTRASOUND IMAGING | Facility: CLINIC | Age: 74
Discharge: HOME | End: 2024-04-09
Payer: MEDICARE

## 2024-04-09 DIAGNOSIS — Z17.1 MALIGNANT NEOPLASM OF UPPER-OUTER QUADRANT OF LEFT BREAST IN FEMALE, ESTROGEN RECEPTOR NEGATIVE (H): ICD-10-CM

## 2024-04-09 DIAGNOSIS — C50.412 MALIGNANT NEOPLASM OF UPPER-OUTER QUADRANT OF LEFT BREAST IN FEMALE, ESTROGEN RECEPTOR NEGATIVE (H): ICD-10-CM

## 2024-04-09 DIAGNOSIS — N63.20 MASS OF LEFT BREAST, UNSPECIFIED QUADRANT: ICD-10-CM

## 2024-04-09 PROCEDURE — 76642 ULTRASOUND BREAST LIMITED: CPT | Mod: LT

## 2024-04-11 ENCOUNTER — MYC MEDICAL ADVICE (OUTPATIENT)
Dept: ONCOLOGY | Facility: CLINIC | Age: 74
End: 2024-04-11

## 2024-04-11 DIAGNOSIS — C67.9 UROTHELIAL CARCINOMA OF BLADDER WITH INVASION OF MUSCLE (H): Primary | ICD-10-CM

## 2024-04-15 ENCOUNTER — OFFICE VISIT (OUTPATIENT)
Dept: CARDIOLOGY | Facility: CLINIC | Age: 74
End: 2024-04-15
Payer: COMMERCIAL

## 2024-04-15 ENCOUNTER — TELEPHONE (OUTPATIENT)
Dept: FAMILY MEDICINE | Facility: OTHER | Age: 74
End: 2024-04-15

## 2024-04-15 VITALS
WEIGHT: 148 LBS | DIASTOLIC BLOOD PRESSURE: 54 MMHG | SYSTOLIC BLOOD PRESSURE: 112 MMHG | BODY MASS INDEX: 23.78 KG/M2 | HEART RATE: 83 BPM | RESPIRATION RATE: 18 BRPM | OXYGEN SATURATION: 95 % | HEIGHT: 66 IN

## 2024-04-15 DIAGNOSIS — E78.5 HYPERLIPIDEMIA LDL GOAL <130: ICD-10-CM

## 2024-04-15 DIAGNOSIS — R07.9 CHEST PAIN, UNSPECIFIED TYPE: ICD-10-CM

## 2024-04-15 DIAGNOSIS — I25.10 CORONARY ARTERY DISEASE INVOLVING NATIVE CORONARY ARTERY OF NATIVE HEART WITHOUT ANGINA PECTORIS: ICD-10-CM

## 2024-04-15 DIAGNOSIS — I10 HYPERTENSION GOAL BP (BLOOD PRESSURE) < 140/90: ICD-10-CM

## 2024-04-15 DIAGNOSIS — I25.10 CORONARY ARTERY CALCIFICATION: Primary | ICD-10-CM

## 2024-04-15 DIAGNOSIS — I77.9 CAROTID ARTERY DISEASE WITHOUT CEREBRAL INFARCTION (H): ICD-10-CM

## 2024-04-15 PROCEDURE — 99214 OFFICE O/P EST MOD 30 MIN: CPT | Performed by: INTERNAL MEDICINE

## 2024-04-15 ASSESSMENT — PAIN SCALES - GENERAL: PAINLEVEL: MILD PAIN (3)

## 2024-04-15 NOTE — PROGRESS NOTES
"  General Cardiology Clinic Progress Note  Michaela Dorman MRN# [id number]   YOB: 1950 Age: 73 year old       Reason for visit: Hypercholesterolemia, coronary calcification    History of presenting illness:    I had the opportunity to see Michaela Dorman at [facility] today for evaluation of coronary artery disease based on \"severe calcification\" of the coronary arteries on CT scan of the chest abdomen and pelvis done for monitoring of bladder cancer.  She also has a history of left carotid endarterectomy in 2020.  Her most recent carotid ultrasound in 2022 showed mild bilateral disease only.    She was started on atorvastatin 40 mg daily for management of her hyperlipidemia based on the calcification of her coronary arteries.  She had a follow-up fasting lipid panel done on 3/20/2024 showing that her LDL was still elevated at 116 with an HDL of 72, triglycerides 224, total cholesterol 233.  Her atorvastatin was increased to 80 mg a day and Zetia 10 mg daily was added to her program.  She has not rechecked her cholesterol numbers yet.    She tells me that she goes to the gym 6 days a week and eats a good healthy diet.  She quit smoking when she was 31 years old, 42 years ago.  She does not have diabetes.  She has hypertension with well-controlled blood pressure readings on a low-dose of carvedilol.  She takes aspirin 81 mg daily.    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  She has horses and is now getting more short of breath cleaning out the stalls.  She also recently had an episode of chest discomfort while she was exercising in the pool.  It was primarily central to left-sided chest discomfort and it resolved quickly with rest.    Her blood pressure today was 112/54, heart rate 83, and weight 148 pounds.  Lungs are clear.  Heart rhythm is regular.  He has no chronic murmurs.              Assessment and Plan: "     ASSESSMENT:    Ms. Lissa Dorman Is a 73-year-old woman with severe coronary artery calcification identified qualitatively on a CT scan of the chest abdomen and pelvis applying significant coronary artery disease.  She also has known vascular disease based on carotid endarterectomy in 2020.  Her cardiac risk factors include hypertension and dyslipidemia.  Her atorvastatin 40 mg daily is not lowering her cholesterol sufficiently and I agree with increasing her atorvastatin to 80 mg daily and adding Zetia which was done in mid March.  She is now having some shortness of breath with exertion and has had at least 1 episode of exertional chest discomfort.  I think would be reasonable to do a stress echocardiogram to evaluate for significant coronary artery disease.  I will recheck her lipid panel again at that time.    If her LDL remains elevated, we may need to consider a PCSK9 inhibitor.    I will plan to see her back again sooner if her stress test is abnormal, otherwise in 1 year.    Miguelito Aguirre MD       Orders this Visit:  Orders Placed This Encounter   Procedures    Lipid Profile    ALT    Lipid Profile    ALT    Basic metabolic panel    Follow-Up with Cardiology    Exercise Stress Echocardiogram     No orders of the defined types were placed in this encounter.    There are no discontinued medications.    Today's clinic visit entailed:    30 minutes spent by me on the date of the encounter doing chart review, history and exam, documentation and further activities per the note  Provider  Link to [facility] Help Grid     The level of medical decision making during this visit was of high complexity.           Review of Systems:     Review of Systems:  Skin:        Eyes:       ENT:       Respiratory:  Negative shortness of breath;cough;wheezing  Cardiovascular:  Negative;palpitations;chest pain;edema Positive for;lightheadedness;dizziness;syncope or near-syncope  Gastroenterology:      Genitourinary:      "  Musculoskeletal:       Neurologic:  Positive for numbness or tingling of hands;numbness or tingling of feet  Psychiatric:       Heme/Lymph/Imm:  Positive for allergies  Endocrine:                 Physical Exam:     Vitals: /54 (BP Location: Right arm, Patient Position: Sitting, Cuff Size: Adult Regular)   Pulse 83   Resp 18   Ht 1.676 m (5' 6\")   Wt 67.1 kg (148 lb)   SpO2 95%   BMI 23.89 kg/m    Constitutional: Well nourished and in no apparent distress.  Eyes: Pupils equal, round. Sclerae anicteric.   HEENT: Normocephalic, atraumatic.   Neck: Supple. JVD   Respiratory: Breathing non-labored. Lungs clear to auscultation bilaterally. No crackles, wheezes, rhonchi, or rales.  Cardiovascular:  Regular rate and rhythm, normal S1 and S2. No murmur, rub, or gallop.  Skin: Warm, dry. No rashes, cyanosis, or xanthelasma.  Extremities: No edema.  Neurologic: No gross motor deficits. Alert, awake, and oriented to person, place and time.  Psychiatric: Affect appropriate.             Medications:     Current Outpatient Medications   Medication Sig Dispense Refill    acetaminophen (TYLENOL) 500 MG tablet Take 1,000 mg by mouth 3 times daily as needed for mild pain (500MG X 2 = 1,000MG)      aspirin (ASA) 81 MG chewable tablet Take 1 tablet (81 mg) by mouth daily 100 tablet 3    atorvastatin (LIPITOR) 40 MG tablet Take 2 tablets (80 mg) by mouth daily 180 tablet 3    carvedilol (COREG) 6.25 MG tablet TAKE 1 TABLET (6.25 MG) BY MOUTH 2 TIMES DAILY (WITH MEALS) PLEASE SEND NEXT REFILL TO PRIMARY CARE AS NEPHROLOGY FOLLOW UP \"AS NEEDED\" 90 tablet 0    Cyanocobalamin (B-12 PO) Take 1 tablet by mouth daily      escitalopram (LEXAPRO) 20 MG tablet Take 1 tablet (20 mg) by mouth daily 90 tablet 3    ezetimibe (ZETIA) 10 MG tablet Take 1 tablet (10 mg) by mouth daily 90 tablet 3    gabapentin (NEURONTIN) 300 MG capsule Take 2 capsules (600 mg) by mouth 3 times daily 180 capsule 5    levothyroxine (SYNTHROID/LEVOTHROID) 25 " MCG tablet TAKE 1 TABLET BY MOUTH EVERY DAY (Patient taking differently: Take 25 mcg by mouth daily) 90 tablet 1    LORazepam (ATIVAN) 0.5 MG tablet TAKE 1 TABLET (0.5 MG) BY MOUTH EVERY 4 HOURS AS NEEDED FOR ANXIETY, NAUSEA OR SLEEP 25 tablet 0    omeprazole (PRILOSEC) 40 MG DR capsule Take 40 mg by mouth daily      prochlorperazine (COMPAZINE) 10 MG tablet TAKE 1/2 TABLET BY MOUTH EVERY 6 HOURS AS NEEDED FORNAUSEA/VOMITING 30 tablet 2    sucralfate (CARAFATE) 1 GM/10ML suspension Take 10 mLs (1 g) by mouth 4 times daily 100 mL 0    famotidine (PEPCID) 20 MG tablet Take 1 tablet (20 mg) by mouth 2 times daily (Patient not taking: Reported on 3/20/2024) 30 tablet 0    meclizine (ANTIVERT) 25 MG tablet Take 12.5 mg by mouth every 6 hours as needed for dizziness 1/2 tablet (Patient not taking: Reported on 3/20/2024)      oxyCODONE (ROXICODONE) 5 MG tablet TAKE 1 TABLET (5 MG) BY MOUTH EVERY 6 HOURS AS NEEDED (Patient not taking: Reported on 3/20/2024) 12 tablet 0       Family History   Problem Relation Age of Onset    Hypertension Mother     Thyroid Disease Mother     Cerebrovascular Disease Mother     Hypertension Father     Cerebrovascular Disease Father     Cancer Father         skin    Thyroid Disease Sister     Diabetes Brother         type 2    Depression Sister     Breast Cancer Sister         age 47    Cancer Sister         skin    Cancer Brother         inside nose       Social History     Socioeconomic History    Marital status:      Spouse name: ozzie    Number of children: 5    Years of education: Not on file    Highest education level: Not on file   Occupational History     Employer: [employer]   Tobacco Use    Smoking status: Former     Current packs/day: 0.00     Average packs/day: 3.0 packs/day for 10.0 years (30.0 ttl pk-yrs)     Types: Cigarettes     Start date: 1969     Quit date: 1979     Years since quittin.4     Passive exposure: Never    Smokeless tobacco: Never    Tobacco  comments:     approx. 3 packs daily   Vaping Use    Vaping status: Never Used   Substance and Sexual Activity    Alcohol use: Not Currently     Comment: Stopped drinking a few weeks ago (july 2023)    Drug use: No    Sexual activity: Yes     Partners: Male   Other Topics Concern     Service Not Asked    Blood Transfusions Not Asked    Caffeine Concern No    Occupational Exposure Not Asked    Hobby Hazards No    Sleep Concern No    Stress Concern Yes    Weight Concern Not Asked    Special Diet Not Asked    Back Care Not Asked    Exercise Yes    Bike Helmet Not Asked    Seat Belt Yes    Self-Exams Not Asked    Parent/sibling w/ CABG, MI or angioplasty before 65F 55M? Not Asked   Social History Narrative    Not on file     Social Determinants of Health     Financial Resource Strain: Low Risk  (1/3/2024)    Financial Resource Strain     Within the past 12 months, have you or your family members you live with been unable to get utilities (heat, electricity) when it was really needed?: No   Food Insecurity: Low Risk  (1/3/2024)    Food Insecurity     Within the past 12 months, did you worry that your food would run out before you got money to buy more?: No     Within the past 12 months, did the food you bought just not last and you didn t have money to get more?: No   Transportation Needs: Low Risk  (1/3/2024)    Transportation Needs     Within the past 12 months, has lack of transportation kept you from medical appointments, getting your medicines, non-medical meetings or appointments, work, or from getting things that you need?: No   Physical Activity: Not on file   Stress: Not on file   Social Connections: Unknown (1/1/2022)    Received from [facility]    Social Connections     Frequency of Communication with Friends and Family: Not on file   Interpersonal Safety: Not on file   Housing Stability: Low Risk  (1/3/2024)    Housing Stability     Do you have housing? : Yes     Are  you worried about losing your housing?: No            Past Medical History:     Past Medical History:   Diagnosis Date    Acute kidney injury (H24)     Carotid stenosis, bilateral L80%, R40% 09/02/2020    Central stenosis of spinal canal 12/01/2017    lumbar     DDD (degenerative disc disease), lumbar 12/01/2017    Depressive disorder, not elsewhere classified     Esophageal reflux     ETOH abuse     in remission    Foraminal stenosis of lumbar region 12/01/2017    Complete lumbar spine left at various degrees and to a lesser extent the right as well.    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    Subdural hematoma (H)     Thyroid disease     Urothelial carcinoma (H)               Past Surgical History:     Past Surgical History:   Procedure Laterality Date    ARTHROPLASTY SHOULDER Right 11/17/2015    ARTHROSCOPY KNEE  6/7/2012    Procedure:ARTHROSCOPY KNEE; right knee arthroscopy with partial lateral menisectomy; Surgeon:DAMIÁN MCALLISTER; Location:[facility]    BIOPSY VAGINAL N/A 10/27/2021    Procedure: DISTAL URETHRECTOMY;  Surgeon: Leonardo Robles MD;  Location: [facility] OR    COLONOSCOPY N/A 12/22/2017    Procedure: COLONOSCOPY;  colonoscopy;  Surgeon: Chuck Chand MD;  Location: [facility]    CYSTECTOMY BLADDER RADICAL, ILEAL DIVERSION, COMBINED N/A 6/1/2021    Procedure: RADICAL CYSTECTOMY  WITH ILEAL CONDUIT CREATION,BILATERAL PELVIC LYMPHADENECTOMY;  Surgeon: Leonardo Robles MD;  Location: [facility] OR    CYSTOSCOPY, RETROGRADES, COMBINED Bilateral 3/18/2021    Procedure: CYSTOSCOPY, WITH RETROGRADE PYELOGRAM;  Surgeon: Girish Jack MD;  Location: [facility]    CYSTOSCOPY, TRANSURETHRAL RESECTION (TUR) TUMOR BLADDER, COMBINED N/A 3/18/2021    Procedure: CYSTOSCOPY, WITH TRANSURETHRAL RESECTION  BLADDER TUMOR;  Surgeon: Girish Jack MD;  Location: [facility]    ENDARTERECTOMY CAROTID Left 10/8/2020    Procedure: LEFT CAROTID ENDARTERECTOMY WITH EEG;  Surgeon: Lacho Jasmine MD;  Location: [facility]    ESOPHAGOSCOPY, GASTROSCOPY, DUODENOSCOPY (EGD), COMBINED N/A 6/20/2022    Procedure: ESOPHAGOGASTRODUODENOSCOPY, WITH BIOPSY;  Surgeon: Ramses Arenas MD;  Location: [facility]    EXCISE MASS AXILLA Left 9/16/2016    Procedure: EXCISE MASS AXILLA;  Surgeon: Keyon Blanco MD;  Location: [facility]    HC REMV CATARACT EXTRACAP,INSERT LENS, W/O ECP  6/25/2009    Right eye    INJECT EPIDURAL LUMBAR N/A 4/11/2019    Procedure: interlaminar epidual steroid injection lumbar 4-5;  Surgeon: Oskar Salvador MD;  Location: [facility]    INJECT EPIDURAL LUMBAR Bilateral 9/12/2019    Procedure: lumbar 4-5 epidural interlaminar steroid injection;  Surgeon: Oskar Salvador MD;  [facility]    INSERT PORT VASCULAR ACCESS Right 10/7/2016    Procedure: INSERT PORT VASCULAR ACCESS;  Surgeon: Keyon Blanco MD;  [facility]    IR CHEST PORT PLACEMENT > 5 YRS OF AGE  4/16/2021    MASTECTOMY SIMPLE BILATERAL Bilateral 2/8/2017    Procedure: MASTECTOMY SIMPLE BILATERAL;  Surgeon: Keyon Blanco MD;  [facility]: [facility]    OPEN REDUCTION INTERNAL FIXATION FOOT Right 3/20/2015    Procedure: OPEN REDUCTION INTERNAL FIXATION FOOT;  Surgeon: Javi Herrmann DPM;  [facility]: [facility]    OPTICAL TRACKING SYSTEM FUSION SPINE POSTERIOR LUMBAR TWO LEVELS N/A 2/4/2020    Procedure: LUMBAR 2-SACRAL1 TRANSFORMINAL INTERBODY FUSION;  Surgeon: Lenny Gomes MD;  Location: [facility]    REMOVE PORT VASCULAR ACCESS Right 2/14/2018    Procedure: REMOVE PORT VASCULAR ACCESS;  right intra jugular port removal;  Surgeon: Keyon Blanco MD;  [facility]    SHOULDER SURGERY Right 11/2015    Z LIGATE FALLOPIAN TUBE      ZZC NONSPECIFIC PROCEDURE  1956    ankle fracture surgery              Allergies:   Oxybutynin       Data:   All laboratory data  reviewed:    Recent Labs   Lab Test 03/20/24  1306 11/27/23  1316 07/07/23  1415 11/28/22  0958 11/16/22  1404 09/12/22  1555 07/11/22  1500 05/02/22  1115   * 107*  --   --   --  90  --   --    HDL 72 84  --   --   --  80  --   --    NHDL 161* 142*  --   --   --  126  --   --    CHOL 233* 226*  --   --   --  206*  --   --    TRIG 224* 176*  --   --   --  179*  --   --    TSH  --  2.11 4.04  --  3.08  --    < > 1.83   IRON  --   --   --   --   --   --   --  103   FEB  --   --   --   --   --   --   --  341   IRONSAT  --   --   --   --   --   --   --  30   DALLIN  --   --   --  90  --   --    < > 49    < > = values in this interval not displayed.       Lab Results   Component Value Date    WBC 9.8 03/20/2024    WBC 13.5 (H) 06/24/2021    RBC 3.62 (L) 03/20/2024    RBC 2.99 (L) 06/24/2021    HGB 12.1 03/20/2024    HGB 9.6 (L) 06/24/2021    HCT 36.5 03/20/2024    HCT 30.0 (L) 06/24/2021     (H) 03/20/2024     06/24/2021    MCH 33.4 (H) 03/20/2024    MCH 32.1 06/24/2021    MCHC 33.2 03/20/2024    MCHC 32.0 06/24/2021    RDW 13.3 03/20/2024    RDW 14.8 06/24/2021     03/20/2024     06/24/2021       Lab Results   Component Value Date     03/20/2024     06/24/2021    POTASSIUM 4.5 03/20/2024    POTASSIUM 4.8 11/28/2022    POTASSIUM 4.7 06/24/2021    CHLORIDE 107 03/20/2024    CHLORIDE 108 11/28/2022    CHLORIDE 106 06/24/2021    CO2 22 03/20/2024    CO2 26 11/28/2022    CO2 24 06/24/2021    ANIONGAP 12 03/20/2024    ANIONGAP 4 11/28/2022    ANIONGAP 6 06/24/2021    GLC 99 03/20/2024    GLC 90 11/28/2022     (H) 06/24/2021    BUN 21.0 03/20/2024    BUN 28 11/28/2022    BUN 30 06/24/2021    CR 1.06 (H) 03/20/2024    CR 1.10 (H) 06/24/2021    GFRESTIMATED 55 (L) 03/20/2024    GFRESTIMATED 48 (L) 03/07/2023    GFRESTIMATED 50 (L) 06/24/2021    GFRESTBLACK 58 (L) 06/24/2021    VANDANA 10.0 03/20/2024    VANDANA 9.6 06/24/2021      Lab Results   Component Value Date    AST 24 03/20/2024     AST 20 05/21/2021    ALT 20 03/20/2024    ALT 38 05/21/2021       Lab Results   Component Value Date    A1C 5.4 07/26/2023    A1C 5.2 10/08/2020       Lab Results   Component Value Date    INR 0.91 04/16/2021    INR 0.9 10/01/2020    INR 0.88 02/12/2018         NIDA CARTER MD  [facility]

## 2024-04-15 NOTE — LETTER
"4/15/2024    Girish Sauer MD   Physicians [address]  [facility] [address]    RE: Michaela Dorman       Dear Colleague,     I had the pleasure of seeing Michaela Dorman in the [facility].    General Cardiology Clinic Progress Note  Michaela Dorman MRN# [id number]   YOB: 1950 Age: 73 year old       Reason for visit: Hypercholesterolemia, coronary calcification    History of presenting illness:    I had the opportunity to see Michaela Dorman at [facility] today for evaluation of coronary artery disease based on \"severe calcification\" of the coronary arteries on CT scan of the chest abdomen and pelvis done for monitoring of bladder cancer.  She also has a history of left carotid endarterectomy in 2020.  Her most recent carotid ultrasound in 2022 showed mild bilateral disease only.    She was started on atorvastatin 40 mg daily for management of her hyperlipidemia based on the calcification of her coronary arteries.  She had a follow-up fasting lipid panel done on 3/20/2024 showing that her LDL was still elevated at 116 with an HDL of 72, triglycerides 224, total cholesterol 233.  Her atorvastatin was increased to 80 mg a day and Zetia 10 mg daily was added to her program.  She has not rechecked her cholesterol numbers yet.    She tells me that she goes to the gym 6 days a week and eats a good healthy diet.  She quit smoking when she was 31 years old, 42 years ago.  She does not have diabetes.  She has hypertension with well-controlled blood pressure readings on a low-dose of carvedilol.  She takes aspirin 81 mg daily.    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  She has horses and is now getting more short of breath cleaning out the stalls.  She also recently had an episode of chest discomfort while she was exercising in the pool.  It was primarily central to left-sided chest discomfort and it " resolved quickly with rest.    Her blood pressure today was 112/54, heart rate 83, and weight 148 pounds.  Lungs are clear.  Heart rhythm is regular.  He has no chronic murmurs.              Assessment and Plan:     ASSESSMENT:    Ms. Lissa Dorman Is a 73-year-old woman with severe coronary artery calcification identified qualitatively on a CT scan of the chest abdomen and pelvis applying significant coronary artery disease.  She also has known vascular disease based on carotid endarterectomy in 2020.  Her cardiac risk factors include hypertension and dyslipidemia.  Her atorvastatin 40 mg daily is not lowering her cholesterol sufficiently and I agree with increasing her atorvastatin to 80 mg daily and adding Zetia which was done in mid March.  She is now having some shortness of breath with exertion and has had at least 1 episode of exertional chest discomfort.  I think would be reasonable to do a stress echocardiogram to evaluate for significant coronary artery disease.  I will recheck her lipid panel again at that time.    If her LDL remains elevated, we may need to consider a PCSK9 inhibitor.    I will plan to see her back again sooner if her stress test is abnormal, otherwise in 1 year.    Miguelito Aguirre MD       Orders this Visit:  Orders Placed This Encounter   Procedures    Lipid Profile    ALT    Lipid Profile    ALT    Basic metabolic panel    Follow-Up with Cardiology    Exercise Stress Echocardiogram     No orders of the defined types were placed in this encounter.    There are no discontinued medications.    Today's clinic visit entailed:    30 minutes spent by me on the date of the encounter doing chart review, history and exam, documentation and further activities per the note  Provider  Link to [facility] Help Grid     The level of medical decision making during this visit was of high complexity.           Review of Systems:     Review of Systems:  Skin:        Eyes:       ENT:       Respiratory:  Negative  "shortness of breath;cough;wheezing  Cardiovascular:  Negative;palpitations;chest pain;edema Positive for;lightheadedness;dizziness;syncope or near-syncope  Gastroenterology:      Genitourinary:       Musculoskeletal:       Neurologic:  Positive for numbness or tingling of hands;numbness or tingling of feet  Psychiatric:       Heme/Lymph/Imm:  Positive for allergies  Endocrine:                 Physical Exam:     Vitals: /54 (BP Location: Right arm, Patient Position: Sitting, Cuff Size: Adult Regular)   Pulse 83   Resp 18   Ht 1.676 m (5' 6\")   Wt 67.1 kg (148 lb)   SpO2 95%   BMI 23.89 kg/m    Constitutional: Well nourished and in no apparent distress.  Eyes: Pupils equal, round. Sclerae anicteric.   HEENT: Normocephalic, atraumatic.   Neck: Supple. JVD   Respiratory: Breathing non-labored. Lungs clear to auscultation bilaterally. No crackles, wheezes, rhonchi, or rales.  Cardiovascular:  Regular rate and rhythm, normal S1 and S2. No murmur, rub, or gallop.  Skin: Warm, dry. No rashes, cyanosis, or xanthelasma.  Extremities: No edema.  Neurologic: No gross motor deficits. Alert, awake, and oriented to person, place and time.  Psychiatric: Affect appropriate.             Medications:     Current Outpatient Medications   Medication Sig Dispense Refill    acetaminophen (TYLENOL) 500 MG tablet Take 1,000 mg by mouth 3 times daily as needed for mild pain (500MG X 2 = 1,000MG)      aspirin (ASA) 81 MG chewable tablet Take 1 tablet (81 mg) by mouth daily 100 tablet 3    atorvastatin (LIPITOR) 40 MG tablet Take 2 tablets (80 mg) by mouth daily 180 tablet 3    carvedilol (COREG) 6.25 MG tablet TAKE 1 TABLET (6.25 MG) BY MOUTH 2 TIMES DAILY (WITH MEALS) PLEASE SEND NEXT REFILL TO PRIMARY CARE AS NEPHROLOGY FOLLOW UP \"AS NEEDED\" 90 tablet 0    Cyanocobalamin (B-12 PO) Take 1 tablet by mouth daily      escitalopram (LEXAPRO) 20 MG tablet Take 1 tablet (20 mg) by mouth daily 90 tablet 3    ezetimibe (ZETIA) 10 MG " tablet Take 1 tablet (10 mg) by mouth daily 90 tablet 3    gabapentin (NEURONTIN) 300 MG capsule Take 2 capsules (600 mg) by mouth 3 times daily 180 capsule 5    levothyroxine (SYNTHROID/LEVOTHROID) 25 MCG tablet TAKE 1 TABLET BY MOUTH EVERY DAY (Patient taking differently: Take 25 mcg by mouth daily) 90 tablet 1    LORazepam (ATIVAN) 0.5 MG tablet TAKE 1 TABLET (0.5 MG) BY MOUTH EVERY 4 HOURS AS NEEDED FOR ANXIETY, NAUSEA OR SLEEP 25 tablet 0    omeprazole (PRILOSEC) 40 MG DR capsule Take 40 mg by mouth daily      prochlorperazine (COMPAZINE) 10 MG tablet TAKE 1/2 TABLET BY MOUTH EVERY 6 HOURS AS NEEDED FORNAUSEA/VOMITING 30 tablet 2    sucralfate (CARAFATE) 1 GM/10ML suspension Take 10 mLs (1 g) by mouth 4 times daily 100 mL 0    famotidine (PEPCID) 20 MG tablet Take 1 tablet (20 mg) by mouth 2 times daily (Patient not taking: Reported on 3/20/2024) 30 tablet 0    meclizine (ANTIVERT) 25 MG tablet Take 12.5 mg by mouth every 6 hours as needed for dizziness 1/2 tablet (Patient not taking: Reported on 3/20/2024)      oxyCODONE (ROXICODONE) 5 MG tablet TAKE 1 TABLET (5 MG) BY MOUTH EVERY 6 HOURS AS NEEDED (Patient not taking: Reported on 3/20/2024) 12 tablet 0       Family History   Problem Relation Age of Onset    Hypertension Mother     Thyroid Disease Mother     Cerebrovascular Disease Mother     Hypertension Father     Cerebrovascular Disease Father     Cancer Father         skin    Thyroid Disease Sister     Diabetes Brother         type 2    Depression Sister     Breast Cancer Sister         age 47    Cancer Sister         skin    Cancer Brother         inside nose       Social History     Socioeconomic History    Marital status:      Spouse name: ozzie    Number of children: 5    Years of education: Not on file    Highest education level: Not on file   Occupational History     Employer: [employer]OMEM[employer]   Tobacco Use    Smoking status: Former     Current packs/day: 0.00     Average packs/day: 3.0 packs/day for  10.0 years (30.0 ttl pk-yrs)     Types: Cigarettes     Start date: 1969     Quit date: 1979     Years since quittin.4     Passive exposure: Never    Smokeless tobacco: Never    Tobacco comments:     approx. 3 packs daily   Vaping Use    Vaping status: Never Used   Substance and Sexual Activity    Alcohol use: Not Currently     Comment: Stopped drinking a few weeks ago (2023)    Drug use: No    Sexual activity: Yes     Partners: Male   Other Topics Concern     Service Not Asked    Blood Transfusions Not Asked    Caffeine Concern No    Occupational Exposure Not Asked    Hobby Hazards No    Sleep Concern No    Stress Concern Yes    Weight Concern Not Asked    Special Diet Not Asked    Back Care Not Asked    Exercise Yes    Bike Helmet Not Asked    Seat Belt Yes    Self-Exams Not Asked    Parent/sibling w/ CABG, MI or angioplasty before 65F 55M? Not Asked   Social History Narrative    Not on file     Social Determinants of Health     Financial Resource Strain: Low Risk  (1/3/2024)    Financial Resource Strain     Within the past 12 months, have you or your family members you live with been unable to get utilities (heat, electricity) when it was really needed?: No   Food Insecurity: Low Risk  (1/3/2024)    Food Insecurity     Within the past 12 months, did you worry that your food would run out before you got money to buy more?: No     Within the past 12 months, did the food you bought just not last and you didn t have money to get more?: No   Transportation Needs: Low Risk  (1/3/2024)    Transportation Needs     Within the past 12 months, has lack of transportation kept you from medical appointments, getting your medicines, non-medical meetings or appointments, work, or from getting things that you need?: No   Physical Activity: Not on file   Stress: Not on file   Social Connections: Unknown (2022)    [facility]    Social Connections      Frequency of Communication with Friends and Family: Not on file   Interpersonal Safety: Not on file   Housing Stability: Low Risk  (1/3/2024)    Housing Stability     Do you have housing? : Yes     Are you worried about losing your housing?: No            Past Medical History:     Past Medical History:   Diagnosis Date    Acute kidney injury (H24)     Carotid stenosis, bilateral L80%, R40% 09/02/2020    Central stenosis of spinal canal 12/01/2017    lumbar     DDD (degenerative disc disease), lumbar 12/01/2017    Depressive disorder, not elsewhere classified     Esophageal reflux     ETOH abuse     in remission    Foraminal stenosis of lumbar region 12/01/2017    Complete lumbar spine left at various degrees and to a lesser extent the right as well.    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    Subdural hematoma (H)     Thyroid disease     Urothelial carcinoma (H)               Past Surgical History:     Past Surgical History:   Procedure Laterality Date    ARTHROPLASTY SHOULDER Right 11/17/2015    ARTHROSCOPY KNEE  6/7/2012    Procedure:ARTHROSCOPY KNEE; right knee arthroscopy with partial lateral menisectomy; Surgeon:DAMIÁN MCALLISTER; Location:[facility]    BIOPSY VAGINAL N/A 10/27/2021    Procedure: DISTAL URETHRECTOMY;  Surgeon: Leonardo Robles MD;  Location: [facility] OR    COLONOSCOPY N/A 12/22/2017    Procedure: COLONOSCOPY;  colonoscopy;  Surgeon: Chuck Chand MD;  Location: [facility]    CYSTECTOMY BLADDER RADICAL, ILEAL DIVERSION, COMBINED N/A 6/1/2021    Procedure: RADICAL CYSTECTOMY  WITH ILEAL CONDUIT CREATION,BILATERAL PELVIC LYMPHADENECTOMY;  Surgeon: Leonardo Robles MD;  Location: [facility] OR    CYSTOSCOPY, RETROGRADES, COMBINED Bilateral 3/18/2021    Procedure: CYSTOSCOPY, WITH  RETROGRADE PYELOGRAM;  Surgeon: Girish Jack MD;  Location: [facility]    CYSTOSCOPY, TRANSURETHRAL RESECTION (TUR) TUMOR BLADDER, COMBINED N/A 3/18/2021    Procedure: CYSTOSCOPY, WITH TRANSURETHRAL RESECTION BLADDER TUMOR;  Surgeon: Girish Jack MD;  Location: [facility]    ENDARTERECTOMY CAROTID Left 10/8/2020    Procedure: LEFT CAROTID ENDARTERECTOMY WITH EEG;  Surgeon: Lacho Jasmine MD;  Location: [facility] OR    ESOPHAGOSCOPY, GASTROSCOPY, DUODENOSCOPY (EGD), COMBINED N/A 6/20/2022    Procedure: ESOPHAGOGASTRODUODENOSCOPY, WITH BIOPSY;  Surgeon: Ramses Arenas MD;  Location: [facility]    EXCISE MASS AXILLA Left 9/16/2016    Procedure: EXCISE MASS AXILLA;  Surgeon: Keyon Blanco MD;  Location: [facility] OR    HC REMV CATARACT EXTRACAP,INSERT LENS, W/O ECP  6/25/2009    Right eye    INJECT EPIDURAL LUMBAR N/A 4/11/2019    Procedure: interlaminar epidual steroid injection lumbar 4-5;  Surgeon: Oskar Salvador MD;  Location: [facility]    INJECT EPIDURAL LUMBAR Bilateral 9/12/2019    Procedure: lumbar 4-5 epidural interlaminar steroid injection;  Surgeon: Oskar Salvador MD;  [facility]: [facility]    INSERT PORT VASCULAR ACCESS Right 10/7/2016    Procedure: INSERT PORT VASCULAR ACCESS;  Surgeon: Keyon Blanco MD;  [facility]: [facility]    IR CHEST PORT PLACEMENT > 5 YRS OF AGE  4/16/2021    MASTECTOMY SIMPLE BILATERAL Bilateral 2/8/2017    Procedure: MASTECTOMY SIMPLE BILATERAL;  Surgeon: Keyon Blanco MD;  [facility]: [facility]    OPEN REDUCTION INTERNAL FIXATION FOOT Right 3/20/2015    Procedure: OPEN REDUCTION INTERNAL FIXATION FOOT;  Surgeon: Javi Herrmann DPM;  Location: [facility]    OPTICAL TRACKING SYSTEM FUSION SPINE POSTERIOR LUMBAR TWO LEVELS N/A 2/4/2020    Procedure: LUMBAR 2-SACRAL1 TRANSFORMINAL INTERBODY FUSION;  Surgeon: Lenny Gomes MD;  Location: [facility]    REMOVE PORT VASCULAR ACCESS Right 2/14/2018    Procedure: REMOVE PORT VASCULAR ACCESS;  right intra jugular port removal;  Surgeon: Keyon Blanco MD;   Location: [facility]    SHOULDER SURGERY Right 11/2015    Z LIGATE FALLOPIAN TUBE      ZZC NONSPECIFIC PROCEDURE  1956    ankle fracture surgery              Allergies:   Oxybutynin       Data:   All laboratory data reviewed:    Recent Labs   Lab Test 03/20/24  1306 11/27/23  1316 07/07/23  1415 11/28/22  0958 11/16/22  1404 09/12/22  1555 07/11/22  1500 05/02/22  1115   * 107*  --   --   --  90  --   --    HDL 72 84  --   --   --  80  --   --    NHDL 161* 142*  --   --   --  126  --   --    CHOL 233* 226*  --   --   --  206*  --   --    TRIG 224* 176*  --   --   --  179*  --   --    TSH  --  2.11 4.04  --  3.08  --    < > 1.83   IRON  --   --   --   --   --   --   --  103   FEB  --   --   --   --   --   --   --  341   IRONSAT  --   --   --   --   --   --   --  30   DALLIN  --   --   --  90  --   --    < > 49    < > = values in this interval not displayed.       Lab Results   Component Value Date    WBC 9.8 03/20/2024    WBC 13.5 (H) 06/24/2021    RBC 3.62 (L) 03/20/2024    RBC 2.99 (L) 06/24/2021    HGB 12.1 03/20/2024    HGB 9.6 (L) 06/24/2021    HCT 36.5 03/20/2024    HCT 30.0 (L) 06/24/2021     (H) 03/20/2024     06/24/2021    MCH 33.4 (H) 03/20/2024    MCH 32.1 06/24/2021    MCHC 33.2 03/20/2024    MCHC 32.0 06/24/2021    RDW 13.3 03/20/2024    RDW 14.8 06/24/2021     03/20/2024     06/24/2021       Lab Results   Component Value Date     03/20/2024     06/24/2021    POTASSIUM 4.5 03/20/2024    POTASSIUM 4.8 11/28/2022    POTASSIUM 4.7 06/24/2021    CHLORIDE 107 03/20/2024    CHLORIDE 108 11/28/2022    CHLORIDE 106 06/24/2021    CO2 22 03/20/2024    CO2 26 11/28/2022    CO2 24 06/24/2021    ANIONGAP 12 03/20/2024    ANIONGAP 4 11/28/2022    ANIONGAP 6 06/24/2021    GLC 99 03/20/2024    GLC 90 11/28/2022     (H) 06/24/2021    BUN 21.0 03/20/2024    BUN 28 11/28/2022    BUN 30 06/24/2021    CR 1.06 (H) 03/20/2024    CR 1.10 (H) 06/24/2021    GFRESTIMATED 55 (L)  03/20/2024    GFRESTIMATED 48 (L) 03/07/2023    GFRESTIMATED 50 (L) 06/24/2021    GFRESTBLACK 58 (L) 06/24/2021    VANDANA 10.0 03/20/2024    VANDANA 9.6 06/24/2021      Lab Results   Component Value Date    AST 24 03/20/2024    AST 20 05/21/2021    ALT 20 03/20/2024    ALT 38 05/21/2021       Lab Results   Component Value Date    A1C 5.4 07/26/2023    A1C 5.2 10/08/2020       Lab Results   Component Value Date    INR 0.91 04/16/2021    INR 0.9 10/01/2020    INR 0.88 02/12/2018         NIDA CARTER MD  [facility]      Thank you for allowing me to participate in the care of your patient.      Sincerely,     NIDA CARTER MD     [facility]  cc:   Osmin Craig MD  [id number] [place] [address]

## 2024-04-16 ENCOUNTER — MYC MEDICAL ADVICE (OUTPATIENT)
Dept: FAMILY MEDICINE | Facility: OTHER | Age: 74
End: 2024-04-16

## 2024-04-16 DIAGNOSIS — G89.4 CHRONIC PAIN SYNDROME: ICD-10-CM

## 2024-04-16 RX ORDER — OXYCODONE HYDROCHLORIDE 5 MG/1
5 TABLET ORAL EVERY 6 HOURS PRN
Qty: 12 TABLET | Refills: 0 | Status: SHIPPED | OUTPATIENT
Start: 2024-04-16 | End: 2024-04-25

## 2024-04-16 NOTE — TELEPHONE ENCOUNTER
RN called and changed appointment per provider  Appointments in Next Year       [phone]     Apr 25, 2024 11:30 AM  (Arrive by 11:10 AM)  Provider Visit with Phani Albright PA-C  [facility] ([facility] ) [phone]           Monica Devries RN  [facility] - Registered Nurse  Clinic Triage [place]   April 16, 2024

## 2024-04-17 ENCOUNTER — CARE COORDINATION (OUTPATIENT)
Dept: CARDIOLOGY | Facility: CLINIC | Age: 74
End: 2024-04-17

## 2024-04-17 ENCOUNTER — NURSE TRIAGE (OUTPATIENT)
Dept: ONCOLOGY | Facility: CLINIC | Age: 74
End: 2024-04-17
Payer: COMMERCIAL

## 2024-04-17 ENCOUNTER — HOSPITAL ENCOUNTER (OUTPATIENT)
Dept: CARDIOLOGY | Facility: CLINIC | Age: 74
Discharge: HOME | End: 2024-04-17
Attending: INTERNAL MEDICINE | Admitting: INTERNAL MEDICINE
Payer: MEDICARE

## 2024-04-17 ENCOUNTER — HOSPITAL ENCOUNTER (OUTPATIENT)
Facility: CLINIC | Age: 74
End: 2024-04-17
Payer: MEDICARE

## 2024-04-17 DIAGNOSIS — R07.9 CHEST PAIN, UNSPECIFIED TYPE: ICD-10-CM

## 2024-04-17 PROCEDURE — 93321 DOPPLER ECHO F-UP/LMTD STD: CPT | Mod: 26 | Performed by: INTERNAL MEDICINE

## 2024-04-17 PROCEDURE — 93016 CV STRESS TEST SUPVJ ONLY: CPT | Performed by: INTERNAL MEDICINE

## 2024-04-17 PROCEDURE — 93350 STRESS TTE ONLY: CPT | Mod: 26 | Performed by: INTERNAL MEDICINE

## 2024-04-17 PROCEDURE — 93350 STRESS TTE ONLY: CPT | Mod: TC

## 2024-04-17 PROCEDURE — 93018 CV STRESS TEST I&R ONLY: CPT | Performed by: INTERNAL MEDICINE

## 2024-04-17 PROCEDURE — 93325 DOPPLER ECHO COLOR FLOW MAPG: CPT | Mod: 26 | Performed by: INTERNAL MEDICINE

## 2024-04-17 PROCEDURE — 93325 DOPPLER ECHO COLOR FLOW MAPG: CPT | Mod: TC

## 2024-04-17 NOTE — PROGRESS NOTES
Stress echo results from 4/17/24 noted. Will route an update to . 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 Pt had 4/15 visit with  and reported shortness of breath with exertion and an episode of exertional chest pain. She has hx of severe coronary artery calcification identified qualitatively on a CT scan of the chest abdomen and pelvis applying significant coronary artery disease. Tom GALVAN April 17, 2024, 3:42 PM            HR: 92  BP: 142/64 mmHg  ______________________________________________________________________________  Procedure  Stress Echo Complete.  ______________________________________________________________________________  Interpretation Summary  No gross wall motion abnormality post stress. Cannot rule out small apical  ischemia due to technically difficult imaging.

## 2024-04-17 NOTE — TELEPHONE ENCOUNTER
Patient reports that she has had some events of chest pain. She was in the pool on Saturday 4/13/2024 and had an event of constant chest pain lasting 5 minutes. After that she was fine. Yesterday she was going to the barn she got a sharp chest pain for about 5 minutes and then it went away. She wants to know what to do and if she should continue to exercise as she is very active.  She has a stress test scheduled for 4/29/2024 due to elevated cholesterol and vascular disease.  She was not seen for the chest pains. 4 days ago she had some pain in her shoulders, that went away. Right now it feels like there is a thump in her chest and she is aware of her heart.    She has not had a heart atacke before. Hx of breast and bladder cancer.  Denies currently: hx of blood clot/PE    Nursing advice: Due to patient's symptoms now and previously, her age and her significant medical and past history she is to be assessed immediately in the E.R. She was advised that she is not to do any exercise or unnecessary exertion until this is assessed and she has been advised differently. Patient was given signs and symptoms to call 911. Patient verbalizes good understanding, agrees with plan and states she needs no further support. Nidhi Paez R.N.    Reason for Disposition   Chest pain lasting longer than 5 minutes and occurred in last 3 days (72 hours) (Exception: Feels exactly the same as previously diagnosed heartburn and has accompanying sour taste in mouth.)    Additional Information   Negative: SEVERE difficulty breathing (e.g., struggling for each breath, speaks in single words)   Negative: Difficult to awaken or acting confused (e.g., disoriented, slurred speech)   Negative: Shock suspected (e.g., cold/pale/clammy skin, too weak to stand, low BP, rapid pulse)   Negative: Passed out (i.e., lost consciousness, collapsed and was not responding)   Negative: Chest pain lasting longer than 5 minutes and ANY of the following:          Pain is crushing, pressure-like, or heavy         Over 44 years old          Over 30 years old and one cardiac risk factor (e.g diabetes, high blood pressure, high cholesterol, smoker, or family history of heart disease)         History of heart disease (e.g. angina, heart attack, heart failure, bypass surgery, takes nitroglycerin)   Negative: Heart beating < 50 beats per minute OR > 140 beats per minute   Negative: Visible sweat on face or sweat dripping down face   Negative: Sounds like a life-threatening emergency to the triager   Negative: SEVERE chest pain   Negative: Pain also in shoulder(s) or arm(s) or jaw   Negative: Difficulty breathing   Negative: Cocaine use within last 3 days   Negative: Major surgery in the past month   Negative: Hip or leg fracture (broken bone) in past month (or had cast on leg or ankle in past month)   Negative: Illness requiring prolonged bedrest in past month (e.g., immobilization, long hospital stay)   Negative: Long-distance travel in past month (e.g., car, bus, train, plane; with trip lasting 6 or more hours)   Negative: History of prior 'blood clot' in leg or lungs (i.e., deep vein thrombosis, pulmonary embolism)   Negative: History of inherited increased risk of blood clots (e.g., Factor 5 Leiden, Anti-thrombin 3, Protein C or Protein S deficiency, Prothrombin mutation)   Negative: Cancer treatment in the past two months (or has cancer now)   Negative: Heart beating irregularly or very rapidly    Protocols used: Chest Pain-A-[place]

## 2024-04-17 NOTE — TELEPHONE ENCOUNTER
I reviewed the stress test, including the EKG tracings and all of the images taken for the stress echocardiogram.  She developed a similar chest discomfort during stress as she had prior to my visit with her.  She had some minor EKG changes as well.  The echo images appear to show inducible hypokinesis involving the distal anterior, distal anteroseptal, and apical walls.  I would suggest that we proceed to coronary angiography for further evaluation.  If she has availability tomorrow, we can do a quick discussion of risk and benefits by virtual call at noon.  Otherwise we could schedule a visit with an [facility]I in the [facility] and set up the angiogram at that time.    Miguelito Aguirre MD

## 2024-04-18 ENCOUNTER — VIRTUAL VISIT (OUTPATIENT)
Dept: CARDIOLOGY | Facility: CLINIC | Age: 74
End: 2024-04-18
Payer: COMMERCIAL

## 2024-04-18 ENCOUNTER — TELEPHONE (OUTPATIENT)
Dept: SURGERY | Facility: CLINIC | Age: 74
End: 2024-04-18

## 2024-04-18 DIAGNOSIS — R07.9 CHEST PAIN, UNSPECIFIED TYPE: Primary | ICD-10-CM

## 2024-04-18 DIAGNOSIS — R94.39 ABNORMAL CARDIOVASCULAR STRESS TEST: ICD-10-CM

## 2024-04-18 PROCEDURE — 99215 OFFICE O/P EST HI 40 MIN: CPT | Mod: 95 | Performed by: INTERNAL MEDICINE

## 2024-04-18 NOTE — LETTER
4/18/2024    Girish Sauer MD   [facility] [address]  [facility] [address]    RE: Michaela Dorman       Dear Colleague,     I had the pleasure of seeing Michaela Dorman in the [facility].  Lissa is a 73 year old who is being evaluated via a billable video visit.    How would you like to obtain your AVS? MyChart  If the video visit is dropped, the invitation should be resent by: Text to cell phone: [phone]  Will anyone else be joining your video visit? No    Video-Visit Details    Type of service:  Video Visit   Video start time 12 PM video End Time: 12:20  Originating Location (pt. Location): Home    Distant Location (provider location):  On-site  Platform used for Video Visit: [facility] Progress Note  Michaela Dorman MRN# [id number]   YOB: 1950 Age: 73 year old       Reason for visit: Abnormal stress test    History of presenting illness:    I had the opportunity to see Michaela Dorman at [facility] today for chest pain and abnormal stress test.    I saw Ms. Troncoso for cardiology consultation on 4/15/2024 and I will refer to that consult note for some of those details.  She was found to have severe coronary artery calcification by CT scan of the chest and has known carotid vascular disease with a history of carotid endarterectomy in 2020.  Her cardiac risk factors include hypertension and dyslipidemia.    She has been having some episodes of exertional chest discomfort at the gym and when she has been cleaning out the stalls for her horses.  I ordered a stress echocardiogram for further evaluation.  The report suggests the possibility of apical ischemia but suggested some concern about image quality.  I reviewed the study myself including the images and EKGs.  The EKG showed minor findings for ischemia and she had recurrence of her exertional chest discomfort during the stress test rated 4/10 in severity.  I  believe the distal anterior, distal anteroseptal, and apical walls appear hypokinetic.  Although the territory is relatively small, I believe there is ischemia present on the study and reviewed those findings with the patient.    She has not been having any further episodes of chest discomfort and has not had any chest discomfort at rest.  Her only episodes happen with exertion and resolves with rest.              Assessment and Plan:     ASSESSMENT:    Ms. Lissa porras is a 73-year-old woman with coronary artery disease based on CT coronary calcification, vascular disease based on a history of carotid endarterectomy, and risk factors including hypertension and dyslipidemia.  She has been having exertional chest discomfort and underwent a stress echocardiogram yesterday.  I believe that study demonstrates a relatively small area of apical ischemia and recommended that she pursue coronary angiography for further evaluation and possible treatment with coronary stenting.    Risks and benefits of left heart catheterization and coronary angiogram were discussed with the patient in detail. Risk estimated at 0.1-0.3% for diagnostic angio and 1-2% for PCI, including risk of stroke, MI, death, emergent bypass, contrast induced allergic reaction, renal dysfunction, and vascular complications (including bleeding and transfusion) were discussed. Patient understands and wishes to proceed.     She has a history of breast cancer and bladder cancer and is scheduled for removal of her port on 4/24/2024.  I suggested that she proceed with port removal and then have her coronary angiogram performed.  I would prefer that she not stop her aspirin for port removal but certainly she could not stop both of her antiplatelet medications if she underwent coronary stenting for at least a year.    Miguelito Aguirre MD       Orders this Visit:  Orders Placed This Encounter   Procedures    Case Request Cath Lab: Coronary Angiogram     No orders of the  "defined types were placed in this encounter.    There are no discontinued medications.    Today's clinic visit entailed:    25 minutes spent by me on the date of the encounter doing chart review, history and exam, documentation and further activities per the note  Provider  Link to [facility] Help Grid     The level of medical decision making during this visit was of high complexity.           Review of Systems:     Review of Systems:  Skin:        Eyes:       ENT:       Respiratory:       Cardiovascular:       Gastroenterology:      Genitourinary:       Musculoskeletal:       Neurologic:       Psychiatric:       Heme/Lymph/Imm:       Endocrine:                 Physical Exam:     Vitals: There were no vitals taken for this visit.              Medications:     Current Outpatient Medications   Medication Sig Dispense Refill    acetaminophen (TYLENOL) 500 MG tablet Take 1,000 mg by mouth 3 times daily as needed for mild pain (500MG X 2 = 1,000MG)      aspirin (ASA) 81 MG chewable tablet Take 1 tablet (81 mg) by mouth daily 100 tablet 3    atorvastatin (LIPITOR) 40 MG tablet Take 2 tablets (80 mg) by mouth daily 180 tablet 3    carvedilol (COREG) 6.25 MG tablet TAKE 1 TABLET (6.25 MG) BY MOUTH 2 TIMES DAILY (WITH MEALS) PLEASE SEND NEXT REFILL TO PRIMARY CARE AS NEPHROLOGY FOLLOW UP \"AS NEEDED\" 90 tablet 0    Cyanocobalamin (B-12 PO) Take 1 tablet by mouth daily      escitalopram (LEXAPRO) 20 MG tablet Take 1 tablet (20 mg) by mouth daily 90 tablet 3    ezetimibe (ZETIA) 10 MG tablet Take 1 tablet (10 mg) by mouth daily 90 tablet 3    famotidine (PEPCID) 20 MG tablet Take 1 tablet (20 mg) by mouth 2 times daily (Patient not taking: Reported on 3/20/2024) 30 tablet 0    gabapentin (NEURONTIN) 300 MG capsule Take 2 capsules (600 mg) by mouth 3 times daily 180 capsule 5    levothyroxine (SYNTHROID/LEVOTHROID) 25 MCG tablet TAKE 1 TABLET BY MOUTH EVERY DAY (Patient taking differently: Take 25 mcg by mouth daily) 90 tablet 1    " LORazepam (ATIVAN) 0.5 MG tablet TAKE 1 TABLET (0.5 MG) BY MOUTH EVERY 4 HOURS AS NEEDED FOR ANXIETY, NAUSEA OR SLEEP 25 tablet 0    meclizine (ANTIVERT) 25 MG tablet Take 12.5 mg by mouth every 6 hours as needed for dizziness 1/2 tablet (Patient not taking: Reported on 3/20/2024)      omeprazole (PRILOSEC) 40 MG DR capsule Take 40 mg by mouth daily      oxyCODONE (ROXICODONE) 5 MG tablet Take 1 tablet (5 mg) by mouth every 6 hours as needed 12 tablet 0    prochlorperazine (COMPAZINE) 10 MG tablet TAKE 1/2 TABLET BY MOUTH EVERY 6 HOURS AS NEEDED FORNAUSEA/VOMITING 30 tablet 2    sucralfate (CARAFATE) 1 GM/10ML suspension Take 10 mLs (1 g) by mouth 4 times daily 100 mL 0       Family History   Problem Relation Age of Onset    Hypertension Mother     Thyroid Disease Mother     Cerebrovascular Disease Mother     Hypertension Father     Cerebrovascular Disease Father     Cancer Father         skin    Thyroid Disease Sister     Diabetes Brother         type 2    Depression Sister     Breast Cancer Sister         age 47    Cancer Sister         skin    Cancer Brother         inside nose       Social History     Socioeconomic History    Marital status:      Spouse name: ozzie    Number of children: 5    Years of education: Not on file    Highest education level: Not on file   Occupational History     Employer: [employer]   Tobacco Use    Smoking status: Former     Current packs/day: 0.00     Average packs/day: 3.0 packs/day for 10.0 years (30.0 ttl pk-yrs)     Types: Cigarettes     Start date: 1969     Quit date: 1979     Years since quittin.4     Passive exposure: Never    Smokeless tobacco: Never    Tobacco comments:     approx. 3 packs daily   Vaping Use    Vaping status: Never Used   Substance and Sexual Activity    Alcohol use: Not Currently     Comment: Stopped drinking a few weeks ago (2023)    Drug use: No    Sexual activity: Yes     Partners: Male   Other Topics Concern      Service Not Asked    Blood Transfusions Not Asked    Caffeine Concern No    Occupational Exposure Not Asked    Hobby Hazards No    Sleep Concern No    Stress Concern Yes    Weight Concern Not Asked    Special Diet Not Asked    Back Care Not Asked    Exercise Yes    Bike Helmet Not Asked    Seat Belt Yes    Self-Exams Not Asked    Parent/sibling w/ CABG, MI or angioplasty before 65F 55M? Not Asked   Social History Narrative    Not on file     Social Determinants of Health     Financial Resource Strain: Low Risk  (1/3/2024)    Financial Resource Strain     Within the past 12 months, have you or your family members you live with been unable to get utilities (heat, electricity) when it was really needed?: No   Food Insecurity: Low Risk  (1/3/2024)    Food Insecurity     Within the past 12 months, did you worry that your food would run out before you got money to buy more?: No     Within the past 12 months, did the food you bought just not last and you didn t have money to get more?: No   Transportation Needs: Low Risk  (1/3/2024)    Transportation Needs     Within the past 12 months, has lack of transportation kept you from medical appointments, getting your medicines, non-medical meetings or appointments, work, or from getting things that you need?: No   Physical Activity: Not on file   Stress: Not on file   Social Connections: Unknown (1/1/2022)    Received from [employer] [facility], [employer] [facility]    Social Connections     Frequency of Communication with Friends and Family: Not on file   Interpersonal Safety: Not on file   Housing Stability: Low Risk  (1/3/2024)    Housing Stability     Do you have housing? : Yes     Are you worried about losing your housing?: No            Past Medical History:     Past Medical History:   Diagnosis Date    Acute kidney injury (H24)     Carotid stenosis, bilateral L80%, R40% 09/02/2020    Central stenosis of spinal canal  12/01/2017    lumbar     DDD (degenerative disc disease), lumbar 12/01/2017    Depressive disorder, not elsewhere classified     Esophageal reflux     ETOH abuse     in remission    Foraminal stenosis of lumbar region 12/01/2017    Complete lumbar spine left at various degrees and to a lesser extent the right as well.    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    Subdural hematoma (H)     Thyroid disease     Urothelial carcinoma (H)               Past Surgical History:     Past Surgical History:   Procedure Laterality Date    ARTHROPLASTY SHOULDER Right 11/17/2015    ARTHROSCOPY KNEE  6/7/2012    Procedure:ARTHROSCOPY KNEE; right knee arthroscopy with partial lateral menisectomy; Surgeon:DAMIÁN MCALLISTER; Location:[facility]    BIOPSY VAGINAL N/A 10/27/2021    Procedure: DISTAL URETHRECTOMY;  Surgeon: Leonardo Robles MD;  Location: [facility] OR    COLONOSCOPY N/A 12/22/2017    Procedure: COLONOSCOPY;  colonoscopy;  Surgeon: Chuck Chand MD;  Location: [facility]    CYSTECTOMY BLADDER RADICAL, ILEAL DIVERSION, COMBINED N/A 6/1/2021    Procedure: RADICAL CYSTECTOMY  WITH ILEAL CONDUIT CREATION,BILATERAL PELVIC LYMPHADENECTOMY;  Surgeon: Leonardo Robles MD;  Location: [facility] OR    CYSTOSCOPY, RETROGRADES, COMBINED Bilateral 3/18/2021    Procedure: CYSTOSCOPY, WITH RETROGRADE PYELOGRAM;  Surgeon: Girish Jack MD;  Location: [facility]    CYSTOSCOPY, TRANSURETHRAL RESECTION (TUR) TUMOR BLADDER, COMBINED N/A 3/18/2021    Procedure: CYSTOSCOPY, WITH TRANSURETHRAL RESECTION BLADDER TUMOR;  Surgeon: Girish Jack MD;  Location: [facility]    ENDARTERECTOMY CAROTID Left 10/8/2020    Procedure: LEFT CAROTID ENDARTERECTOMY WITH EEG;  Surgeon: Lacho Jasmine MD;  Location: [facility]    ESOPHAGOSCOPY, GASTROSCOPY,  DUODENOSCOPY (EGD), COMBINED N/A 6/20/2022    Procedure: ESOPHAGOGASTRODUODENOSCOPY, WITH BIOPSY;  Surgeon: Ramses Arenas MD;  Location: [facility]    EXCISE MASS AXILLA Left 9/16/2016    Procedure: EXCISE MASS AXILLA;  Surgeon: Keyon Blanco MD;  Location: [facility]    HC REMV CATARACT EXTRACAP,INSERT LENS, W/O ECP  6/25/2009    Right eye    INJECT EPIDURAL LUMBAR N/A 4/11/2019    Procedure: interlaminar epidual steroid injection lumbar 4-5;  Surgeon: Oskar Salvador MD;  [facility]: [facility]    INJECT EPIDURAL LUMBAR Bilateral 9/12/2019    Procedure: lumbar 4-5 epidural interlaminar steroid injection;  Surgeon: Oskar Salvador MD;  [facility]    INSERT PORT VASCULAR ACCESS Right 10/7/2016    Procedure: INSERT PORT VASCULAR ACCESS;  Surgeon: Keyon Blanco MD;  [facility]: [facility]    IR CHEST PORT PLACEMENT > 5 YRS OF AGE  4/16/2021    MASTECTOMY SIMPLE BILATERAL Bilateral 2/8/2017    Procedure: MASTECTOMY SIMPLE BILATERAL;  Surgeon: Keyon Blanco MD;  [facility]    OPEN REDUCTION INTERNAL FIXATION FOOT Right 3/20/2015    Procedure: OPEN REDUCTION INTERNAL FIXATION FOOT;  Surgeon: Javi Herrmann DPM;  [facility]    OPTICAL TRACKING SYSTEM FUSION SPINE POSTERIOR LUMBAR TWO LEVELS N/A 2/4/2020    Procedure: LUMBAR 2-SACRAL1 TRANSFORMINAL INTERBODY FUSION;  Surgeon: Lenny Gomes MD;  Location: [facility]    REMOVE PORT VASCULAR ACCESS Right 2/14/2018    Procedure: REMOVE PORT VASCULAR ACCESS;  right intra jugular port removal;  Surgeon: Keyon Blanco MD;  Location: [facility]    SHOULDER SURGERY Right 11/2015    ZZC LIGATE FALLOPIAN TUBE      ZZC NONSPECIFIC PROCEDURE  1956    ankle fracture surgery              Allergies:   Oxybutynin       Data:   All laboratory data reviewed:    Recent Labs   Lab Test 03/20/24  1306 11/27/23  1316 07/07/23  1415 11/28/22  0958 11/16/22  1404 09/12/22  1555 07/11/22  1500 05/02/22  1115   * 107*  --   --   --  90  --   --    HDL 72 84  --   --   --  80  --   --     NHDL 161* 142*  --   --   --  126  --   --    CHOL 233* 226*  --   --   --  206*  --   --    TRIG 224* 176*  --   --   --  179*  --   --    TSH  --  2.11 4.04  --  3.08  --    < > 1.83   IRON  --   --   --   --   --   --   --  103   FEB  --   --   --   --   --   --   --  341   IRONSAT  --   --   --   --   --   --   --  30   DALLIN  --   --   --  90  --   --    < > 49    < > = values in this interval not displayed.       Lab Results   Component Value Date    WBC 9.8 03/20/2024    WBC 13.5 (H) 06/24/2021    RBC 3.62 (L) 03/20/2024    RBC 2.99 (L) 06/24/2021    HGB 12.1 03/20/2024    HGB 9.6 (L) 06/24/2021    HCT 36.5 03/20/2024    HCT 30.0 (L) 06/24/2021     (H) 03/20/2024     06/24/2021    MCH 33.4 (H) 03/20/2024    MCH 32.1 06/24/2021    MCHC 33.2 03/20/2024    MCHC 32.0 06/24/2021    RDW 13.3 03/20/2024    RDW 14.8 06/24/2021     03/20/2024     06/24/2021       Lab Results   Component Value Date     03/20/2024     06/24/2021    POTASSIUM 4.5 03/20/2024    POTASSIUM 4.8 11/28/2022    POTASSIUM 4.7 06/24/2021    CHLORIDE 107 03/20/2024    CHLORIDE 108 11/28/2022    CHLORIDE 106 06/24/2021    CO2 22 03/20/2024    CO2 26 11/28/2022    CO2 24 06/24/2021    ANIONGAP 12 03/20/2024    ANIONGAP 4 11/28/2022    ANIONGAP 6 06/24/2021    GLC 99 03/20/2024    GLC 90 11/28/2022     (H) 06/24/2021    BUN 21.0 03/20/2024    BUN 28 11/28/2022    BUN 30 06/24/2021    CR 1.06 (H) 03/20/2024    CR 1.10 (H) 06/24/2021    GFRESTIMATED 55 (L) 03/20/2024    GFRESTIMATED 48 (L) 03/07/2023    GFRESTIMATED 50 (L) 06/24/2021    GFRESTBLACK 58 (L) 06/24/2021    VANDANA 10.0 03/20/2024    VANDANA 9.6 06/24/2021      Lab Results   Component Value Date    AST 24 03/20/2024    AST 20 05/21/2021    ALT 20 03/20/2024    ALT 38 05/21/2021       Lab Results   Component Value Date    A1C 5.4 07/26/2023    A1C 5.2 10/08/2020       Lab Results   Component Value Date    INR 0.91 04/16/2021    INR 0.9 10/01/2020     INR 0.88 02/12/2018       MIGUELITO AGUIRRE MD  [facility]    Thank you for allowing me to participate in the care of your patient.      Sincerely,     MIGUELITO AGUIRRE MD   [facility]  cc:   Miguelito Aguirre MD  [address]  [place] [address]

## 2024-04-18 NOTE — PROGRESS NOTES
Lissa is a 73 year old who is being evaluated via a billable video visit.    How would you like to obtain your AVS? MyChart  If the video visit is dropped, the invitation should be resent by: Text to cell phone: [phone]  Will anyone else be joining your video visit? No    Video-Visit Details    Type of service:  Video Visit   Video start time 12 PM video End Time: 12:20  Originating Location (pt. Location): Home    Distant Location (provider location):  On-site  Platform used for Video Visit: [facility] Progress Note  Michaela Dorman MRN# [id number]   YOB: 1950 Age: 73 year old       Reason for visit: Abnormal stress test    History of presenting illness:    I had the opportunity to see Michaela Dorman at [facility] today for chest pain and abnormal stress test.    I saw Ms. Troncoso for cardiology consultation on 4/15/2024 and I will refer to that consult note for some of those details.  She was found to have severe coronary artery calcification by CT scan of the chest and has known carotid vascular disease with a history of carotid endarterectomy in 2020.  Her cardiac risk factors include hypertension and dyslipidemia.    She has been having some episodes of exertional chest discomfort at the gym and when she has been cleaning out the stalls for her horses.  I ordered a stress echocardiogram for further evaluation.  The report suggests the possibility of apical ischemia but suggested some concern about image quality.  I reviewed the study myself including the images and EKGs.  The EKG showed minor findings for ischemia and she had recurrence of her exertional chest discomfort during the stress test rated 4/10 in severity.  I believe the distal anterior, distal anteroseptal, and apical walls appear hypokinetic.  Although the territory is relatively small, I believe there is ischemia present on the study and reviewed those findings with the patient.    She  has not been having any further episodes of chest discomfort and has not had any chest discomfort at rest.  Her only episodes happen with exertion and resolves with rest.              Assessment and Plan:     ASSESSMENT:    Ms. Lissa porras is a 73-year-old woman with coronary artery disease based on CT coronary calcification, vascular disease based on a history of carotid endarterectomy, and risk factors including hypertension and dyslipidemia.  She has been having exertional chest discomfort and underwent a stress echocardiogram yesterday.  I believe that study demonstrates a relatively small area of apical ischemia and recommended that she pursue coronary angiography for further evaluation and possible treatment with coronary stenting.    Risks and benefits of left heart catheterization and coronary angiogram were discussed with the patient in detail. Risk estimated at 0.1-0.3% for diagnostic angio and 1-2% for PCI, including risk of stroke, MI, death, emergent bypass, contrast induced allergic reaction, renal dysfunction, and vascular complications (including bleeding and transfusion) were discussed. Patient understands and wishes to proceed.     She has a history of breast cancer and bladder cancer and is scheduled for removal of her port on 4/24/2024.  I suggested that she proceed with port removal and then have her coronary angiogram performed.  I would prefer that she not stop her aspirin for port removal but certainly she could not stop both of her antiplatelet medications if she underwent coronary stenting for at least a year.    Miguelito Aguirre MD       Orders this Visit:  Orders Placed This Encounter   Procedures    Case Request Cath Lab: Coronary Angiogram     No orders of the defined types were placed in this encounter.    There are no discontinued medications.    Today's clinic visit entailed:    25 minutes spent by me on the date of the encounter doing chart review, history and exam, documentation and  "further activities per the note  Provider  Link to MDM Help Grid     The level of medical decision making during this visit was of high complexity.           Review of Systems:     Review of Systems:  Skin:        Eyes:       ENT:       Respiratory:       Cardiovascular:       Gastroenterology:      Genitourinary:       Musculoskeletal:       Neurologic:       Psychiatric:       Heme/Lymph/Imm:       Endocrine:                 Physical Exam:     Vitals: There were no vitals taken for this visit.              Medications:     Current Outpatient Medications   Medication Sig Dispense Refill    acetaminophen (TYLENOL) 500 MG tablet Take 1,000 mg by mouth 3 times daily as needed for mild pain (500MG X 2 = 1,000MG)      aspirin (ASA) 81 MG chewable tablet Take 1 tablet (81 mg) by mouth daily 100 tablet 3    atorvastatin (LIPITOR) 40 MG tablet Take 2 tablets (80 mg) by mouth daily 180 tablet 3    carvedilol (COREG) 6.25 MG tablet TAKE 1 TABLET (6.25 MG) BY MOUTH 2 TIMES DAILY (WITH MEALS) PLEASE SEND NEXT REFILL TO PRIMARY CARE AS NEPHROLOGY FOLLOW UP \"AS NEEDED\" 90 tablet 0    Cyanocobalamin (B-12 PO) Take 1 tablet by mouth daily      escitalopram (LEXAPRO) 20 MG tablet Take 1 tablet (20 mg) by mouth daily 90 tablet 3    ezetimibe (ZETIA) 10 MG tablet Take 1 tablet (10 mg) by mouth daily 90 tablet 3    famotidine (PEPCID) 20 MG tablet Take 1 tablet (20 mg) by mouth 2 times daily (Patient not taking: Reported on 3/20/2024) 30 tablet 0    gabapentin (NEURONTIN) 300 MG capsule Take 2 capsules (600 mg) by mouth 3 times daily 180 capsule 5    levothyroxine (SYNTHROID/LEVOTHROID) 25 MCG tablet TAKE 1 TABLET BY MOUTH EVERY DAY (Patient taking differently: Take 25 mcg by mouth daily) 90 tablet 1    LORazepam (ATIVAN) 0.5 MG tablet TAKE 1 TABLET (0.5 MG) BY MOUTH EVERY 4 HOURS AS NEEDED FOR ANXIETY, NAUSEA OR SLEEP 25 tablet 0    meclizine (ANTIVERT) 25 MG tablet Take 12.5 mg by mouth every 6 hours as needed for dizziness 1/2 " tablet (Patient not taking: Reported on 3/20/2024)      omeprazole (PRILOSEC) 40 MG DR capsule Take 40 mg by mouth daily      oxyCODONE (ROXICODONE) 5 MG tablet Take 1 tablet (5 mg) by mouth every 6 hours as needed 12 tablet 0    prochlorperazine (COMPAZINE) 10 MG tablet TAKE 1/2 TABLET BY MOUTH EVERY 6 HOURS AS NEEDED FORNAUSEA/VOMITING 30 tablet 2    sucralfate (CARAFATE) 1 GM/10ML suspension Take 10 mLs (1 g) by mouth 4 times daily 100 mL 0       Family History   Problem Relation Age of Onset    Hypertension Mother     Thyroid Disease Mother     Cerebrovascular Disease Mother     Hypertension Father     Cerebrovascular Disease Father     Cancer Father         skin    Thyroid Disease Sister     Diabetes Brother         type 2    Depression Sister     Breast Cancer Sister         age 47    Cancer Sister         skin    Cancer Brother         inside nose       Social History     Socioeconomic History    Marital status:      Spouse name: ozzie    Number of children: 5    Years of education: Not on file    Highest education level: Not on file   Occupational History     Employer: [employer]   Tobacco Use    Smoking status: Former     Current packs/day: 0.00     Average packs/day: 3.0 packs/day for 10.0 years (30.0 ttl pk-yrs)     Types: Cigarettes     Start date: 1969     Quit date: 1979     Years since quittin.4     Passive exposure: Never    Smokeless tobacco: Never    Tobacco comments:     approx. 3 packs daily   Vaping Use    Vaping status: Never Used   Substance and Sexual Activity    Alcohol use: Not Currently     Comment: Stopped drinking a few weeks ago (2023)    Drug use: No    Sexual activity: Yes     Partners: Male   Other Topics Concern     Service Not Asked    Blood Transfusions Not Asked    Caffeine Concern No    Occupational Exposure Not Asked    Hobby Hazards No    Sleep Concern No    Stress Concern Yes    Weight Concern Not Asked    Special Diet Not Asked    Back Care  Not Asked    Exercise Yes    Bike Helmet Not Asked    Seat Belt Yes    Self-Exams Not Asked    Parent/sibling w/ CABG, MI or angioplasty before 65F 55M? Not Asked   Social History Narrative    Not on file     Social Determinants of Health     Financial Resource Strain: Low Risk  (1/3/2024)    Financial Resource Strain     Within the past 12 months, have you or your family members you live with been unable to get utilities (heat, electricity) when it was really needed?: No   Food Insecurity: Low Risk  (1/3/2024)    Food Insecurity     Within the past 12 months, did you worry that your food would run out before you got money to buy more?: No     Within the past 12 months, did the food you bought just not last and you didn t have money to get more?: No   Transportation Needs: Low Risk  (1/3/2024)    Transportation Needs     Within the past 12 months, has lack of transportation kept you from medical appointments, getting your medicines, non-medical meetings or appointments, work, or from getting things that you need?: No   Physical Activity: Not on file   Stress: Not on file   Social Connections: Unknown (1/1/2022)    Received from [facility] the [facility], [facility]    Social Connections     Frequency of Communication with Friends and Family: Not on file   Interpersonal Safety: Not on file   Housing Stability: Low Risk  (1/3/2024)    Housing Stability     Do you have housing? : Yes     Are you worried about losing your housing?: No            Past Medical History:     Past Medical History:   Diagnosis Date    Acute kidney injury (H24)     Carotid stenosis, bilateral L80%, R40% 09/02/2020    Central stenosis of spinal canal 12/01/2017    lumbar     DDD (degenerative disc disease), lumbar 12/01/2017    Depressive disorder, not elsewhere classified     Esophageal reflux     ETOH abuse     in remission    Foraminal stenosis of lumbar region 12/01/2017    Complete  lumbar spine left at various degrees and to a lesser extent the right as well.    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    Subdural hematoma (H)     Thyroid disease     Urothelial carcinoma (H)               Past Surgical History:     Past Surgical History:   Procedure Laterality Date    ARTHROPLASTY SHOULDER Right 11/17/2015    ARTHROSCOPY KNEE  6/7/2012    Procedure:ARTHROSCOPY KNEE; right knee arthroscopy with partial lateral menisectomy; Surgeon:DAMIÁN MCALLISTER; Location:[facility] OR    BIOPSY VAGINAL N/A 10/27/2021    Procedure: DISTAL URETHRECTOMY;  Surgeon: Leonardo Robles MD;  Location: [facility] OR    COLONOSCOPY N/A 12/22/2017    Procedure: COLONOSCOPY;  colonoscopy;  Surgeon: Chuck Chand MD;  Location: [facility] GI    CYSTECTOMY BLADDER RADICAL, ILEAL DIVERSION, COMBINED N/A 6/1/2021    Procedure: RADICAL CYSTECTOMY  WITH ILEAL CONDUIT CREATION,BILATERAL PELVIC LYMPHADENECTOMY;  Surgeon: Leonardo Robles MD;  Location: [facility] OR    CYSTOSCOPY, RETROGRADES, COMBINED Bilateral 3/18/2021    Procedure: CYSTOSCOPY, WITH RETROGRADE PYELOGRAM;  Surgeon: Girish Jack MD;  Location: [facility] OR    CYSTOSCOPY, TRANSURETHRAL RESECTION (TUR) TUMOR BLADDER, COMBINED N/A 3/18/2021    Procedure: CYSTOSCOPY, WITH TRANSURETHRAL RESECTION BLADDER TUMOR;  Surgeon: Girish Jack MD;  Location: [facility] OR    ENDARTERECTOMY CAROTID Left 10/8/2020    Procedure: LEFT CAROTID ENDARTERECTOMY WITH EEG;  Surgeon: Lacho Jasmine MD;  Location:  OR    ESOPHAGOSCOPY, GASTROSCOPY, DUODENOSCOPY (EGD), COMBINED N/A 6/20/2022    Procedure: ESOPHAGOGASTRODUODENOSCOPY, WITH BIOPSY;  Surgeon: Ramses Arenas MD;  Location:  GI    EXCISE MASS AXILLA Left 9/16/2016    Procedure: EXCISE MASS AXILLA;  Surgeon: Keyon Blanco,  MD;  Location: [facility] OR    HC REMV CATARACT EXTRACAP,INSERT LENS, W/O ECP  6/25/2009    Right eye    INJECT EPIDURAL LUMBAR N/A 4/11/2019    Procedure: interlaminar epidual steroid injection lumbar 4-5;  Surgeon: Oskar Salvador MD;  Location: [facility]    INJECT EPIDURAL LUMBAR Bilateral 9/12/2019    Procedure: lumbar 4-5 epidural interlaminar steroid injection;  Surgeon: Oskar Salvador MD;  [facility]    INSERT PORT VASCULAR ACCESS Right 10/7/2016    Procedure: INSERT PORT VASCULAR ACCESS;  Surgeon: Keyon Blanco MD;  Location: [facility] OR    IR CHEST PORT PLACEMENT > 5 YRS OF AGE  4/16/2021    MASTECTOMY SIMPLE BILATERAL Bilateral 2/8/2017    Procedure: MASTECTOMY SIMPLE BILATERAL;  Surgeon: Keyon Blanco MD;  [facility]: [facility]    OPEN REDUCTION INTERNAL FIXATION FOOT Right 3/20/2015    Procedure: OPEN REDUCTION INTERNAL FIXATION FOOT;  Surgeon: Javi Herrmann DPM;  Location: [facility] OR    OPTICAL TRACKING SYSTEM FUSION SPINE POSTERIOR LUMBAR TWO LEVELS N/A 2/4/2020    Procedure: LUMBAR 2-SACRAL1 TRANSFORMINAL INTERBODY FUSION;  Surgeon: Lenny Gomes MD;  Location: [facility] OR    REMOVE PORT VASCULAR ACCESS Right 2/14/2018    Procedure: REMOVE PORT VASCULAR ACCESS;  right intra jugular port removal;  Surgeon: Keyon Blanco MD;  Location: [facility] OR    SHOULDER SURGERY Right 11/2015    ZZ LIGATE FALLOPIAN TUBE      ZZC NONSPECIFIC PROCEDURE  1956    ankle fracture surgery              Allergies:   Oxybutynin       Data:   All laboratory data reviewed:    Recent Labs   Lab Test 03/20/24  1306 11/27/23  1316 07/07/23  1415 11/28/22  0958 11/16/22  1404 09/12/22  1555 07/11/22  1500 05/02/22  1115   * 107*  --   --   --  90  --   --    HDL 72 84  --   --   --  80  --   --    NHDL 161* 142*  --   --   --  126  --   --    CHOL 233* 226*  --   --   --  206*  --   --    TRIG 224* 176*  --   --   --  179*  --   --    TSH  --  2.11 4.04  --  3.08  --    < > 1.83   IRON  --   --   --   --   --   --   --  103 FEB   --   --   --   --   --   --   --  341   IRONSAT  --   --   --   --   --   --   --  30   DALLIN  --   --   --  90  --   --    < > 49    < > = values in this interval not displayed.       Lab Results   Component Value Date    WBC 9.8 03/20/2024    WBC 13.5 (H) 06/24/2021    RBC 3.62 (L) 03/20/2024    RBC 2.99 (L) 06/24/2021    HGB 12.1 03/20/2024    HGB 9.6 (L) 06/24/2021    HCT 36.5 03/20/2024    HCT 30.0 (L) 06/24/2021     (H) 03/20/2024     06/24/2021    MCH 33.4 (H) 03/20/2024    MCH 32.1 06/24/2021    MCHC 33.2 03/20/2024    MCHC 32.0 06/24/2021    RDW 13.3 03/20/2024    RDW 14.8 06/24/2021     03/20/2024     06/24/2021       Lab Results   Component Value Date     03/20/2024     06/24/2021    POTASSIUM 4.5 03/20/2024    POTASSIUM 4.8 11/28/2022    POTASSIUM 4.7 06/24/2021    CHLORIDE 107 03/20/2024    CHLORIDE 108 11/28/2022    CHLORIDE 106 06/24/2021    CO2 22 03/20/2024    CO2 26 11/28/2022    CO2 24 06/24/2021    ANIONGAP 12 03/20/2024    ANIONGAP 4 11/28/2022    ANIONGAP 6 06/24/2021    GLC 99 03/20/2024    GLC 90 11/28/2022     (H) 06/24/2021    BUN 21.0 03/20/2024    BUN 28 11/28/2022    BUN 30 06/24/2021    CR 1.06 (H) 03/20/2024    CR 1.10 (H) 06/24/2021    GFRESTIMATED 55 (L) 03/20/2024    GFRESTIMATED 48 (L) 03/07/2023    GFRESTIMATED 50 (L) 06/24/2021    GFRESTBLACK 58 (L) 06/24/2021    VANDANA 10.0 03/20/2024    VANDANA 9.6 06/24/2021      Lab Results   Component Value Date    AST 24 03/20/2024    AST 20 05/21/2021    ALT 20 03/20/2024    ALT 38 05/21/2021       Lab Results   Component Value Date    A1C 5.4 07/26/2023    A1C 5.2 10/08/2020       Lab Results   Component Value Date    INR 0.91 04/16/2021    INR 0.9 10/01/2020    INR 0.88 02/12/2018         NIDA CARTER MD  [facility]

## 2024-04-18 NOTE — TELEPHONE ENCOUNTER
Writer updated patient after consulting Dr. Aguirre and Dr. Olivares. Due to patient needing angiogram, port removal will need to be on hold. Patient will need to be on dual antiplatelet therapy for a full year uninterrupted if a stent is placed. Dr. Aguirre does not feel comfortable with patient needing to hold asprin for 7 days prior to port removal. Due to possible bleeding risks, Dr. Olivares feels that port removal should wait until patient is cleared from cardiology. Patient understands and agrees with plan. Patient has no further questions or concerns.      Carol Rollins RN on 4/18/2024 at 4:10 PM

## 2024-04-18 NOTE — TELEPHONE ENCOUNTER
Writer spoke with patient regarding below. Writer cancelled IR port removal at [facility]. Patient will be seeing Dr. Olivares for this. Patient is currently taking a daily aspirin that was recommended by cardiology. Writer has message out to patient's cardiologist, Dr. Aguirre, regarding holding aspirin prior to port removal. Patient has an appointment with Dr. Aguirre today for consult for coronary angiogram. Patient will most likely need port removal rescheduled to later date as patient will need to double her aspirin intake prior to angio. Writer will await recommendation from cardiologist.      Carol Rollins RN on 4/18/2024 at 10:17 AM

## 2024-04-18 NOTE — TELEPHONE ENCOUNTER
Reason for Call:  Other appointment    Detailed comments: Patient is calling confused because she has a potential port removal scheduled with Dr. Olivares for 04/24 but she got a [employer] message stated that she is getting a port removal on 04/23 in [facility]? Can somebody call her to clarify?    Phone Number Patient can be reached at: Home number on file [phone] (home)    Best Time: any    Can we leave a detailed message on this number? YES    Call taken on 4/18/2024 at 8:49 AM by Radha Magallanes

## 2024-04-18 NOTE — NURSING NOTE
Spoke with patient. Reviewed angiogram prep instructions with her, also sent copy of prep instructions to her mychart. Patient verbalized understanding and did not have any further questions at this time.      Coronary angiogram prep instructions.     Patient is scheduled for a Coronary Angiogram at [facility] - [address], [place] [address] - Main Entrance of the Hospital on TBD.  Check in time is at D and procedure to follow. Staff message sent to [facility] schedulers to call patient set up date and time of procedure.     Patient instructed to remain NPO (Nothing by Mouth) for solid foods 8 hours prior to arrival and may have clear liquids up to 2 hours prior to arrival.    Patient Patient does not require extra fluids prior to procedure.    Patient is not diabetic.    Patient is not on anticoagulation.    Patient is not on diuretics.     Patient is taking ASA 81mg daily and will take 4 tabs (324mg) the morning of the procedure.    Pt is not on a SGLT2 inhibitor.    Pt is not on a GLP-1 Agonist    Patient advised to take their other daily medications the morning of the procedure with small sips of water.     Verified patient does not have a contrast allergy.    Verified patient has someone available to drive them home from the hospital and can stay with them for 24 hours after the procedure.     Patient advised to notify care team with any new COVID like symptoms prior to procedure. Day of procedure phone number: Pepper at [phone]    Patient is aware of visitor policy.    Patient expresses understanding of above instructions and denies further questions at this time.      Graciela Etienne RN  [facility]

## 2024-04-18 NOTE — PROGRESS NOTES
Spoke with patient this morning regarding her stress echocardiogram results. Patient is available to do a video visit with Dr. Aguirre today at 12pm to further discuss angiogram.

## 2024-04-19 ENCOUNTER — CARE COORDINATION (OUTPATIENT)
Dept: CARDIOLOGY | Facility: CLINIC | Age: 74
End: 2024-04-19
Payer: COMMERCIAL

## 2024-04-19 ENCOUNTER — TELEPHONE (OUTPATIENT)
Dept: FAMILY MEDICINE | Facility: OTHER | Age: 74
End: 2024-04-19
Payer: COMMERCIAL

## 2024-04-19 DIAGNOSIS — I25.10 CORONARY ARTERY DISEASE INVOLVING NATIVE CORONARY ARTERY OF NATIVE HEART WITHOUT ANGINA PECTORIS: ICD-10-CM

## 2024-04-19 DIAGNOSIS — R94.39 ABNORMAL CARDIOVASCULAR STRESS TEST: Primary | ICD-10-CM

## 2024-04-19 RX ORDER — POTASSIUM CHLORIDE 1500 MG/1
20 TABLET, EXTENDED RELEASE ORAL
Status: CANCELLED | OUTPATIENT
Start: 2024-04-19

## 2024-04-19 RX ORDER — ASPIRIN 81 MG/1
243 TABLET, CHEWABLE ORAL ONCE
Status: CANCELLED | OUTPATIENT
Start: 2024-04-19

## 2024-04-19 RX ORDER — ASPIRIN 325 MG
325 TABLET ORAL ONCE
Status: CANCELLED | OUTPATIENT
Start: 2024-04-19 | End: 2024-04-19

## 2024-04-19 RX ORDER — LIDOCAINE 40 MG/G
CREAM TOPICAL
Status: CANCELLED | OUTPATIENT
Start: 2024-04-19

## 2024-04-19 RX ORDER — SODIUM CHLORIDE 9 MG/ML
INJECTION, SOLUTION INTRAVENOUS CONTINUOUS
Status: CANCELLED | OUTPATIENT
Start: 2024-04-19

## 2024-04-19 NOTE — TELEPHONE ENCOUNTER
Patient Quality Outreach    Patient is due for the following:   Physical Annual Wellness Visit  Chronic Opioid Use -  Treatment Agreement (CSA) and Urine Drug Screen    Next Steps:   Patient has upcoming appointment, these items will be addressed at that time.    Type of outreach:    Chart review performed, no outreach needed.      Questions for provider review:    None           Aminta Browning, [facility]

## 2024-04-23 ENCOUNTER — HOSPITAL ENCOUNTER (OUTPATIENT)
Facility: CLINIC | Age: 74
Discharge: HOME | End: 2024-04-23
Admitting: INTERNAL MEDICINE
Payer: MEDICARE

## 2024-04-23 VITALS
WEIGHT: 146 LBS | HEIGHT: 66 IN | RESPIRATION RATE: 16 BRPM | OXYGEN SATURATION: 97 % | DIASTOLIC BLOOD PRESSURE: 82 MMHG | HEART RATE: 67 BPM | BODY MASS INDEX: 23.46 KG/M2 | TEMPERATURE: 98.1 F | SYSTOLIC BLOOD PRESSURE: 174 MMHG

## 2024-04-23 DIAGNOSIS — R94.39 ABNORMAL CARDIOVASCULAR STRESS TEST: ICD-10-CM

## 2024-04-23 DIAGNOSIS — I25.10 CORONARY ARTERY DISEASE INVOLVING NATIVE CORONARY ARTERY OF NATIVE HEART WITHOUT ANGINA PECTORIS: ICD-10-CM

## 2024-04-23 DIAGNOSIS — R07.9 CHEST PAIN, UNSPECIFIED TYPE: ICD-10-CM

## 2024-04-23 LAB
ACT BLD: 271 SECONDS (ref 74–150)
ACT BLD: 306 SECONDS (ref 74–150)
ANION GAP SERPL CALCULATED.3IONS-SCNC: 11 MMOL/L (ref 7–15)
APTT PPP: 22 SECONDS (ref 22–38)
ATRIAL RATE - MUSE: 59 BPM
ATRIAL RATE - MUSE: 70 BPM
BUN SERPL-MCNC: 27.6 MG/DL (ref 8–23)
CALCIUM SERPL-MCNC: 9.8 MG/DL (ref 8.8–10.2)
CHLORIDE SERPL-SCNC: 105 MMOL/L (ref 98–107)
CREAT SERPL-MCNC: 0.98 MG/DL (ref 0.51–0.95)
DEPRECATED HCO3 PLAS-SCNC: 25 MMOL/L (ref 22–29)
DIASTOLIC BLOOD PRESSURE - MUSE: NORMAL MMHG
DIASTOLIC BLOOD PRESSURE - MUSE: NORMAL MMHG
EGFRCR SERPLBLD CKD-EPI 2021: 60 ML/MIN/1.73M2
ERYTHROCYTE [DISTWIDTH] IN BLOOD BY AUTOMATED COUNT: 13.6 % (ref 10–15)
GLUCOSE SERPL-MCNC: 85 MG/DL (ref 70–99)
HCT VFR BLD AUTO: 34.2 % (ref 35–47)
HGB BLD-MCNC: 11.2 G/DL (ref 11.7–15.7)
INR PPP: 0.85 (ref 0.85–1.15)
INTERPRETATION ECG - MUSE: NORMAL
INTERPRETATION ECG - MUSE: NORMAL
MCH RBC QN AUTO: 33.6 PG (ref 26.5–33)
MCHC RBC AUTO-ENTMCNC: 32.7 G/DL (ref 31.5–36.5)
MCV RBC AUTO: 103 FL (ref 78–100)
P AXIS - MUSE: 64 DEGREES
P AXIS - MUSE: 88 DEGREES
PLATELET # BLD AUTO: 281 10E3/UL (ref 150–450)
POTASSIUM SERPL-SCNC: 4.5 MMOL/L (ref 3.4–5.3)
PR INTERVAL - MUSE: 160 MS
PR INTERVAL - MUSE: 174 MS
QRS DURATION - MUSE: 92 MS
QRS DURATION - MUSE: 92 MS
QT - MUSE: 408 MS
QT - MUSE: 442 MS
QTC - MUSE: 437 MS
QTC - MUSE: 440 MS
R AXIS - MUSE: 11 DEGREES
R AXIS - MUSE: 83 DEGREES
RBC # BLD AUTO: 3.33 10E6/UL (ref 3.8–5.2)
SODIUM SERPL-SCNC: 141 MMOL/L (ref 135–145)
SYSTOLIC BLOOD PRESSURE - MUSE: NORMAL MMHG
SYSTOLIC BLOOD PRESSURE - MUSE: NORMAL MMHG
T AXIS - MUSE: 39 DEGREES
T AXIS - MUSE: 82 DEGREES
VENTRICULAR RATE- MUSE: 59 BPM
VENTRICULAR RATE- MUSE: 70 BPM
WBC # BLD AUTO: 7.3 10E3/UL (ref 4–11)

## 2024-04-23 PROCEDURE — 85347 COAGULATION TIME ACTIVATED: CPT | Mod: 91

## 2024-04-23 PROCEDURE — 250N000009 HC RX 250: Performed by: INTERNAL MEDICINE

## 2024-04-23 PROCEDURE — 85610 PROTHROMBIN TIME: CPT | Performed by: INTERNAL MEDICINE

## 2024-04-23 PROCEDURE — C1753 CATH, INTRAVAS ULTRASOUND: HCPCS | Performed by: INTERNAL MEDICINE

## 2024-04-23 PROCEDURE — 99153 MOD SED SAME PHYS/QHP EA: CPT | Performed by: INTERNAL MEDICINE

## 2024-04-23 PROCEDURE — 93010 ELECTROCARDIOGRAM REPORT: CPT | Mod: 59 | Performed by: INTERNAL MEDICINE

## 2024-04-23 PROCEDURE — 250N000011 HC RX IP 250 OP 636: Performed by: INTERNAL MEDICINE

## 2024-04-23 PROCEDURE — C1725 CATH, TRANSLUMIN NON-LASER: HCPCS | Performed by: INTERNAL MEDICINE

## 2024-04-23 PROCEDURE — 80048 BASIC METABOLIC PNL TOTAL CA: CPT | Performed by: INTERNAL MEDICINE

## 2024-04-23 PROCEDURE — 85730 THROMBOPLASTIN TIME PARTIAL: CPT | Performed by: INTERNAL MEDICINE

## 2024-04-23 PROCEDURE — 93458 L HRT ARTERY/VENTRICLE ANGIO: CPT | Performed by: INTERNAL MEDICINE

## 2024-04-23 PROCEDURE — C1874 STENT, COATED/COV W/DEL SYS: HCPCS | Performed by: INTERNAL MEDICINE

## 2024-04-23 PROCEDURE — C1769 GUIDE WIRE: HCPCS | Performed by: INTERNAL MEDICINE

## 2024-04-23 PROCEDURE — C1894 INTRO/SHEATH, NON-LASER: HCPCS | Performed by: INTERNAL MEDICINE

## 2024-04-23 PROCEDURE — 92928 PRQ TCAT PLMT NTRAC ST 1 LES: CPT | Mod: LC | Performed by: INTERNAL MEDICINE

## 2024-04-23 PROCEDURE — 999N000071 HC STATISTIC HEART CATH LAB OR EP LAB

## 2024-04-23 PROCEDURE — 258N000003 HC RX IP 258 OP 636: Performed by: INTERNAL MEDICINE

## 2024-04-23 PROCEDURE — 99152 MOD SED SAME PHYS/QHP 5/>YRS: CPT | Performed by: INTERNAL MEDICINE

## 2024-04-23 PROCEDURE — 250N000013 HC RX MED GY IP 250 OP 250 PS 637: Performed by: INTERNAL MEDICINE

## 2024-04-23 PROCEDURE — 36591 DRAW BLOOD OFF VENOUS DEVICE: CPT | Performed by: INTERNAL MEDICINE

## 2024-04-23 PROCEDURE — 85027 COMPLETE CBC AUTOMATED: CPT | Performed by: INTERNAL MEDICINE

## 2024-04-23 PROCEDURE — C1887 CATHETER, GUIDING: HCPCS | Performed by: INTERNAL MEDICINE

## 2024-04-23 PROCEDURE — 93458 L HRT ARTERY/VENTRICLE ANGIO: CPT | Mod: 26 | Performed by: INTERNAL MEDICINE

## 2024-04-23 PROCEDURE — 92978 ENDOLUMINL IVUS OCT C 1ST: CPT | Mod: 26 | Performed by: INTERNAL MEDICINE

## 2024-04-23 PROCEDURE — 999N000184 HC STATISTIC TELEMETRY

## 2024-04-23 PROCEDURE — 272N000001 HC OR GENERAL SUPPLY STERILE: Performed by: INTERNAL MEDICINE

## 2024-04-23 PROCEDURE — 92978 ENDOLUMINL IVUS OCT C 1ST: CPT | Performed by: INTERNAL MEDICINE

## 2024-04-23 PROCEDURE — C9600 PERC DRUG-EL COR STENT SING: HCPCS | Mod: LC | Performed by: INTERNAL MEDICINE

## 2024-04-23 DEVICE — STENT CORONARY DES SYNERGY XD MR US 2.50X16MM H7493941816250: Type: IMPLANTABLE DEVICE | Status: FUNCTIONAL

## 2024-04-23 DEVICE — STENT CORONARY DES SYNERGY XD MR US 2.50X20MM H7493941820250: Type: IMPLANTABLE DEVICE | Status: FUNCTIONAL

## 2024-04-23 RX ORDER — ASPIRIN 81 MG/1
81 TABLET, CHEWABLE ORAL ONCE
Status: DISCONTINUED | OUTPATIENT
Start: 2024-04-23 | End: 2024-04-23 | Stop reason: HOSPADM

## 2024-04-23 RX ORDER — NITROGLYCERIN 5 MG/ML
VIAL (ML) INTRAVENOUS
Status: DISCONTINUED | OUTPATIENT
Start: 2024-04-23 | End: 2024-04-23 | Stop reason: HOSPADM

## 2024-04-23 RX ORDER — NALOXONE HYDROCHLORIDE 0.4 MG/ML
0.2 INJECTION, SOLUTION INTRAMUSCULAR; INTRAVENOUS; SUBCUTANEOUS
Status: DISCONTINUED | OUTPATIENT
Start: 2024-04-23 | End: 2024-04-23 | Stop reason: HOSPADM

## 2024-04-23 RX ORDER — METOPROLOL TARTRATE 1 MG/ML
5 INJECTION, SOLUTION INTRAVENOUS
Status: DISCONTINUED | OUTPATIENT
Start: 2024-04-23 | End: 2024-04-23 | Stop reason: HOSPADM

## 2024-04-23 RX ORDER — FLUMAZENIL 0.1 MG/ML
0.2 INJECTION, SOLUTION INTRAVENOUS
Status: DISCONTINUED | OUTPATIENT
Start: 2024-04-23 | End: 2024-04-23 | Stop reason: HOSPADM

## 2024-04-23 RX ORDER — IOPAMIDOL 755 MG/ML
INJECTION, SOLUTION INTRAVASCULAR
Status: DISCONTINUED | OUTPATIENT
Start: 2024-04-23 | End: 2024-04-23 | Stop reason: HOSPADM

## 2024-04-23 RX ORDER — FENTANYL CITRATE 50 UG/ML
INJECTION, SOLUTION INTRAMUSCULAR; INTRAVENOUS
Status: DISCONTINUED | OUTPATIENT
Start: 2024-04-23 | End: 2024-04-23 | Stop reason: HOSPADM

## 2024-04-23 RX ORDER — LIDOCAINE 40 MG/G
CREAM TOPICAL
Status: DISCONTINUED | OUTPATIENT
Start: 2024-04-23 | End: 2024-04-23 | Stop reason: HOSPADM

## 2024-04-23 RX ORDER — CLOPIDOGREL BISULFATE 75 MG/1
TABLET ORAL
Status: DISCONTINUED | OUTPATIENT
Start: 2024-04-23 | End: 2024-04-23 | Stop reason: HOSPADM

## 2024-04-23 RX ORDER — VERAPAMIL HYDROCHLORIDE 2.5 MG/ML
INJECTION, SOLUTION INTRAVENOUS
Status: DISCONTINUED | OUTPATIENT
Start: 2024-04-23 | End: 2024-04-23 | Stop reason: HOSPADM

## 2024-04-23 RX ORDER — SODIUM CHLORIDE 9 MG/ML
INJECTION, SOLUTION INTRAVENOUS CONTINUOUS
Status: DISCONTINUED | OUTPATIENT
Start: 2024-04-23 | End: 2024-04-23 | Stop reason: HOSPADM

## 2024-04-23 RX ORDER — ACETAMINOPHEN 325 MG/1
650 TABLET ORAL EVERY 4 HOURS PRN
Status: DISCONTINUED | OUTPATIENT
Start: 2024-04-23 | End: 2024-04-23 | Stop reason: HOSPADM

## 2024-04-23 RX ORDER — ASPIRIN 325 MG
325 TABLET ORAL ONCE
Status: DISCONTINUED | OUTPATIENT
Start: 2024-04-23 | End: 2024-04-23 | Stop reason: HOSPADM

## 2024-04-23 RX ORDER — SODIUM CHLORIDE 9 MG/ML
INJECTION, SOLUTION INTRAVENOUS CONTINUOUS
Status: ACTIVE | OUTPATIENT
Start: 2024-04-23 | End: 2024-04-23

## 2024-04-23 RX ORDER — POTASSIUM CHLORIDE 1500 MG/1
20 TABLET, EXTENDED RELEASE ORAL
Status: DISCONTINUED | OUTPATIENT
Start: 2024-04-23 | End: 2024-04-23 | Stop reason: HOSPADM

## 2024-04-23 RX ORDER — ATROPINE SULFATE 0.1 MG/ML
0.5 INJECTION INTRAVENOUS
Status: DISCONTINUED | OUTPATIENT
Start: 2024-04-23 | End: 2024-04-23 | Stop reason: HOSPADM

## 2024-04-23 RX ORDER — FENTANYL CITRATE 50 UG/ML
25 INJECTION, SOLUTION INTRAMUSCULAR; INTRAVENOUS
Status: DISCONTINUED | OUTPATIENT
Start: 2024-04-23 | End: 2024-04-23 | Stop reason: HOSPADM

## 2024-04-23 RX ORDER — HEPARIN SODIUM,PORCINE 10 UNIT/ML
5-10 VIAL (ML) INTRAVENOUS EVERY 24 HOURS
Status: DISCONTINUED | OUTPATIENT
Start: 2024-04-23 | End: 2024-04-23 | Stop reason: HOSPADM

## 2024-04-23 RX ORDER — CLOPIDOGREL BISULFATE 75 MG/1
75 TABLET ORAL DAILY
Status: DISCONTINUED | OUTPATIENT
Start: 2024-04-24 | End: 2024-04-23 | Stop reason: HOSPADM

## 2024-04-23 RX ORDER — HEPARIN SODIUM 1000 [USP'U]/ML
INJECTION, SOLUTION INTRAVENOUS; SUBCUTANEOUS
Status: DISCONTINUED | OUTPATIENT
Start: 2024-04-23 | End: 2024-04-23 | Stop reason: HOSPADM

## 2024-04-23 RX ORDER — HYDRALAZINE HYDROCHLORIDE 20 MG/ML
10 INJECTION INTRAMUSCULAR; INTRAVENOUS EVERY 4 HOURS PRN
Status: DISCONTINUED | OUTPATIENT
Start: 2024-04-23 | End: 2024-04-23 | Stop reason: HOSPADM

## 2024-04-23 RX ORDER — HEPARIN SODIUM,PORCINE 10 UNIT/ML
5-10 VIAL (ML) INTRAVENOUS
Status: DISCONTINUED | OUTPATIENT
Start: 2024-04-23 | End: 2024-04-23 | Stop reason: HOSPADM

## 2024-04-23 RX ORDER — CLOPIDOGREL BISULFATE 75 MG/1
75 TABLET ORAL DAILY
Qty: 90 TABLET | Refills: 3 | Status: SHIPPED | OUTPATIENT
Start: 2024-04-24

## 2024-04-23 RX ORDER — ONDANSETRON 4 MG/1
4 TABLET, ORALLY DISINTEGRATING ORAL EVERY 6 HOURS PRN
Status: DISCONTINUED | OUTPATIENT
Start: 2024-04-23 | End: 2024-04-23 | Stop reason: HOSPADM

## 2024-04-23 RX ORDER — METOPROLOL TARTRATE 1 MG/ML
INJECTION, SOLUTION INTRAVENOUS
Status: DISCONTINUED | OUTPATIENT
Start: 2024-04-23 | End: 2024-04-23 | Stop reason: HOSPADM

## 2024-04-23 RX ORDER — NALOXONE HYDROCHLORIDE 0.4 MG/ML
0.4 INJECTION, SOLUTION INTRAMUSCULAR; INTRAVENOUS; SUBCUTANEOUS
Status: DISCONTINUED | OUTPATIENT
Start: 2024-04-23 | End: 2024-04-23 | Stop reason: HOSPADM

## 2024-04-23 RX ORDER — OXYCODONE HYDROCHLORIDE 5 MG/1
10 TABLET ORAL EVERY 4 HOURS PRN
Status: DISCONTINUED | OUTPATIENT
Start: 2024-04-23 | End: 2024-04-23 | Stop reason: HOSPADM

## 2024-04-23 RX ORDER — ACETAMINOPHEN 500 MG
1000 TABLET ORAL ONCE
Status: COMPLETED | OUTPATIENT
Start: 2024-04-23 | End: 2024-04-23

## 2024-04-23 RX ORDER — NITROGLYCERIN 0.4 MG/1
0.4 TABLET SUBLINGUAL EVERY 5 MIN PRN
Status: DISCONTINUED | OUTPATIENT
Start: 2024-04-23 | End: 2024-04-23 | Stop reason: HOSPADM

## 2024-04-23 RX ORDER — ASPIRIN 81 MG/1
243 TABLET, CHEWABLE ORAL ONCE
Status: DISCONTINUED | OUTPATIENT
Start: 2024-04-23 | End: 2024-04-23 | Stop reason: HOSPADM

## 2024-04-23 RX ORDER — ONDANSETRON 2 MG/ML
4 INJECTION INTRAMUSCULAR; INTRAVENOUS EVERY 6 HOURS PRN
Status: DISCONTINUED | OUTPATIENT
Start: 2024-04-23 | End: 2024-04-23 | Stop reason: HOSPADM

## 2024-04-23 RX ORDER — OXYCODONE HYDROCHLORIDE 5 MG/1
5 TABLET ORAL EVERY 4 HOURS PRN
Status: DISCONTINUED | OUTPATIENT
Start: 2024-04-23 | End: 2024-04-23 | Stop reason: HOSPADM

## 2024-04-23 RX ORDER — HEPARIN SODIUM (PORCINE) LOCK FLUSH IV SOLN 100 UNIT/ML 100 UNIT/ML
5-10 SOLUTION INTRAVENOUS
Status: DISCONTINUED | OUTPATIENT
Start: 2024-04-23 | End: 2024-04-23 | Stop reason: HOSPADM

## 2024-04-23 RX ORDER — ASPIRIN 81 MG/1
81 TABLET ORAL DAILY
Status: DISCONTINUED | OUTPATIENT
Start: 2024-04-24 | End: 2024-04-23 | Stop reason: HOSPADM

## 2024-04-23 RX ADMIN — ACETAMINOPHEN 1000 MG: 500 TABLET ORAL at 10:26

## 2024-04-23 RX ADMIN — SODIUM CHLORIDE: 9 INJECTION, SOLUTION INTRAVENOUS at 07:12

## 2024-04-23 RX ADMIN — HYDRALAZINE HYDROCHLORIDE 10 MG: 20 INJECTION INTRAMUSCULAR; INTRAVENOUS at 11:19

## 2024-04-23 RX ADMIN — HEPARIN SODIUM (PORCINE) LOCK FLUSH IV SOLN 100 UNIT/ML 5 ML: 100 SOLUTION at 13:57

## 2024-04-23 ASSESSMENT — ACTIVITIES OF DAILY LIVING (ADL)
ADLS_ACUITY_SCORE: 37

## 2024-04-23 NOTE — Clinical Note
Max pressure = 14 kim. Total duration = 16 seconds.     Max pressure = 22 kim. Total duration = 30 seconds.    Balloon reinflated a second time: Max pressure = 22 kim. Total duration = 30 seconds.  Balloon reinflated a third time: Max pressure = 22 kim. Total duration = 30 seconds.

## 2024-04-23 NOTE — Clinical Note
Ultrasound catheter inserted for high resolution imaging into circumflex and ostium marginal circumflex.

## 2024-04-23 NOTE — PROGRESS NOTES
0945: Pt returned from Cath Lab. TR band intact to Right wrist.  No oozing or hematoma noted. Area soft & flat. Right arm elevated on pillows. Pt denies pain. Pt instructed on activity restrictions with Right wrist. Verbal understanding received from pt.

## 2024-04-23 NOTE — Clinical Note
Max pressure = 12 kim. Total duration = 14 seconds.     Max pressure = 12 kim. Total duration = 10 seconds.    Balloon reinflated a second time: Max pressure = 12 kim. Total duration = 10 seconds.

## 2024-04-23 NOTE — PROGRESS NOTES
Care Suites Admission Nursing Note    Patient Information  Name: Michaela Dorman  Age: 73 year old  Reason for admission: heart cath  Care Suites arrival time: 0630    Visitor Information  Name: tracie     Patient Admission/Assessment   Pre-procedure assessment complete: Yes  If abnormal assessment/labs, provider notified: N/A  NPO: Yes  Medications held per instructions/orders: Yes  Consent: obtained  If applicable, pregnancy test status: n.a  Patient oriented to room: Yes  Education/questions answered: Yes  Plan/other: proceed    Discharge Planning  Discharge name/phone number: [email or web address]-spouse    Overnight post sedation caregiver: tracie  Discharge location: home    Archie Galo RN

## 2024-04-23 NOTE — DISCHARGE INSTRUCTIONS
Cardiac Angioplasty/Stent Discharge Instructions - Radial    After you go home:    Have an adult stay with you until tomorrow.  Drink extra fluids for 2 days.  You may resume your normal diet.  No smoking       For 24 hours - due to the sedation you received:  Relax and take it easy.  Do NOT make any important or legal decisions.  Do NOT drive or operate machines at home or at work.  Do NOT drink alcohol.    Care of Wrist Puncture Site:    For the first 24 hrs - check the puncture site every 1-2 hours while awake.  It is normal to have soreness at the puncture site and mild tingling in your hand for up to 3 days.  Remove the bandaid after 24 hours. If there is minor oozing, apply another bandaid and remove it after 12 hours.  You may shower tomorrow.  Do NOT take a bath, or use a hot tub or pool for at least 3 days. Do NOT scrub the site. Do not use lotion or powder near the puncture site.           Activity:          For 2 days:   do not use your hand or arm to support your weight (such as rising from a chair)   do not bend your wrist (such as lifting a garage door).  do not lift more than 5 pounds or exercise your arm (such as tennis, golf or bowling).  Do NOT do any heavy activity such as exercise, lifting, or straining.     Bleeding:    If you start bleeding from the site in your wrist, sit down and press firmly on/above the site for 10 minutes.   Once bleeding stops, keep arm still for 2 hours.   Call [facility] as soon as you can.       Call 911 right away if you have heavy bleeding or bleeding that does not stop.      Medicines:    If you are taking an antiplatelet medication such as Plavix, Brilinta or Effient, do not stop taking it until you talk to your cardiologist.      If you are on Metformin (Glucophage), do not restart it until you have blood tests (within 2 to 3 days after discharge).  After you have your blood drawn, you may restart the Metformin.   Take your medications, including blood thinners,  unless your provider tells you not to.    If you take Coumadin (Warfarin), have your INR checked by your provider in  3-5 days. Call your clinic to schedule this.  If you have stopped any medicines, check with your provider about when to restart them.    Follow Up Appointments:    Follow up with [facility] Nurse Practitioner at [facility] of patient preference in 7-10 days.  Cardiac Rehab will contact you for follow up care.    Call the clinic if:    You have a large or growing hard lump around the site.  The site is red, swollen, hot or tender.  Blood or fluid is draining from the site.  You have chills or a fever greater than 101 F (38 C).  Your arm feels numb, cool or changes color.  You have hives, a rash or unusual itching.  Any questions or concerns.    Other Instructions:    If you received a stent - carry your stent card with you at all times.      [facility] [place]:    [phone] [facility] (7 days a week)

## 2024-04-23 NOTE — Clinical Note
LCA Cine(s)  injected utilizing power injector system. Secondary to novel coronavirus, management as above

## 2024-04-23 NOTE — PRE-PROCEDURE
GENERAL PRE-PROCEDURE:   Procedure:  Coronary angiography w/ possible PCI  Date/Time:  4/23/2024 7:15 AM    Written consent obtained?: Yes    Risks and benefits: Risks, benefits and alternatives were discussed    DC Plan: Appropriate discharge home plan in place for patients who are going home after procedure   Consent given by:  Patient  Patient states understanding of procedure being performed: Yes    Patient's understanding of procedure matches consent: Yes    Procedure consent matches procedure scheduled: Yes    Expected level of sedation:  Moderate  Appropriately NPO:  Yes  ASA Class:  3  Mallampati  :  Grade 3- soft palate visible, posterior pharyngeal wall not visible  Lungs:  Lungs clear with good breath sounds bilaterally  Heart:  Normal heart sounds and rate  History & Physical reviewed:  History and physical reviewed and no updates needed  Statement of review:  I have reviewed the lab findings, diagnostic data, medications, and the plan for sedation

## 2024-04-23 NOTE — PROGRESS NOTES
Care Suites Discharge Summary    Discharge Criteria:   Discharge Criteria met per MD orders: Yes.   Vital signs stable.     Pt demonstrates ability to ambulate safely: Yes.  (See discharge questionnaire for additional information)    Discharge instructions & education:   Discharge instructions reviewed with patient and spouse. Patient verbalizes  understanding.   Additional patient education provided:  cardiac angio w stents.     Medications:   Patient will be discharging on new medications- Yes. Plavix- Patient verbalizes reason for use, start date, and side effects Yes.    Items returned to patient:   Home and hospital acquired medications returned to patient Yes   Listed belongings gathered and returned to patient: Yes    Patient discharged to home.     Archie Galo RN

## 2024-04-23 NOTE — Clinical Note
Stent deployed in the obtuse marginal. Max pressure = 10 kim. Total duration = 20 seconds. Balloon reinflated a second time: Max pressure = 18 kim. Total duration = 14 seconds.

## 2024-04-23 NOTE — Clinical Note
Max pressure = 6 kim. Total duration = 22 seconds.     Max pressure = 6 kim. Total duration = 10 seconds.    Balloon reinflated a second time: Max pressure = 6 kim. Total duration = 10 seconds.  Balloon reinflated a third time: Max pressure = 14 kim. Total duration = 16 seconds.  Balloon reinflated a fourth time: Max pressure = 18 kim. Total duration = 14 seconds.  Balloon reinflated a fourth time: Max pressure = 18 kim. Total duration  = 10 seconds.

## 2024-04-23 NOTE — Clinical Note
RCA Cine(s)  injected utilizing power injector system. Topical Clindamycin Pregnancy And Lactation Text: This medication is Pregnancy Category B and is considered safe during pregnancy. It is unknown if it is excreted in breast milk. Include Pregnancy/Lactation Warning?: No Bactrim Counseling:  I discussed with the patient the risks of sulfa antibiotics including but not limited to GI upset, allergic reaction, drug rash, diarrhea, dizziness, photosensitivity, and yeast infections.  Rarely, more serious reactions can occur including but not limited to aplastic anemia, agranulocytosis, methemoglobinemia, blood dyscrasias, liver or kidney failure, lung infiltrates or desquamative/blistering drug rashes. Tetracycline Counseling: Patient counseled regarding possible photosensitivity and increased risk for sunburn.  Patient instructed to avoid sunlight, if possible.  When exposed to sunlight, patients should wear protective clothing, sunglasses, and sunscreen.  The patient was instructed to call the office immediately if the following severe adverse effects occur:  hearing changes, easy bruising/bleeding, severe headache, or vision changes.  The patient verbalized understanding of the proper use and possible adverse effects of tetracycline.  All of the patient's questions and concerns were addressed. Patient understands to avoid pregnancy while on therapy due to potential birth defects. Azithromycin Pregnancy And Lactation Text: This medication is considered safe during pregnancy and is also secreted in breast milk. Tetracycline Pregnancy And Lactation Text: This medication is Pregnancy Category D and not consider safe during pregnancy. It is also excreted in breast milk. Doxycycline Pregnancy And Lactation Text: This medication is Pregnancy Category D and not consider safe during pregnancy. It is also excreted in breast milk but is considered safe for shorter treatment courses. Isotretinoin Counseling: Patient should get monthly blood tests, not donate blood, not drive at night if vision affected, not share medication, and not undergo elective surgery for 6 months after tx completed. Side effects reviewed, pt to contact office should one occur. Spironolactone Pregnancy And Lactation Text: This medication can cause feminization of the male fetus and should be avoided during pregnancy. The active metabolite is also found in breast milk. Topical Sulfur Applications Counseling: Topical Sulfur Counseling: Patient counseled that this medication may cause skin irritation or allergic reactions.  In the event of skin irritation, the patient was advised to reduce the amount of the drug applied or use it less frequently.   The patient verbalized understanding of the proper use and possible adverse effects of topical sulfur application.  All of the patient's questions and concerns were addressed. Birth Control Pills Counseling: Birth Control Pill Counseling: I discussed with the patient the potential side effects of OCPs including but not limited to increased risk of stroke, heart attack, thrombophlebitis, deep venous thrombosis, hepatic adenomas, breast changes, GI upset, headaches, and depression.  The patient verbalized understanding of the proper use and possible adverse effects of OCPs. All of the patient's questions and concerns were addressed. Benzoyl Peroxide Pregnancy And Lactation Text: This medication is Pregnancy Category C. It is unknown if benzoyl peroxide is excreted in breast milk. Topical Retinoid Pregnancy And Lactation Text: This medication is Pregnancy Category C. It is unknown if this medication is excreted in breast milk. Dapsone Counseling: I discussed with the patient the risks of dapsone including but not limited to hemolytic anemia, agranulocytosis, rashes, methemoglobinemia, kidney failure, peripheral neuropathy, headaches, GI upset, and liver toxicity.  Patients who start dapsone require monitoring including baseline LFTs and weekly CBCs for the first month, then every month thereafter.  The patient verbalized understanding of the proper use and possible adverse effects of dapsone.  All of the patient's questions and concerns were addressed. Dapsone Pregnancy And Lactation Text: This medication is Pregnancy Category C and is not considered safe during pregnancy or breast feeding. Bactrim Pregnancy And Lactation Text: This medication is Pregnancy Category D and is known to cause fetal risk.  It is also excreted in breast milk. Birth Control Pills Pregnancy And Lactation Text: This medication should be avoided if pregnant and for the first 30 days post-partum. Azithromycin Counseling:  I discussed with the patient the risks of azithromycin including but not limited to GI upset, allergic reaction, drug rash, diarrhea, and yeast infections. Topical Retinoid counseling:  Patient advised to apply a pea-sized amount only at bedtime and wait 30 minutes after washing their face before applying.  If too drying, patient may add a non-comedogenic moisturizer. The patient verbalized understanding of the proper use and possible adverse effects of retinoids.  All of the patient's questions and concerns were addressed. Tazorac Pregnancy And Lactation Text: This medication is not safe during pregnancy. It is unknown if this medication is excreted in breast milk. Minocycline Counseling: Patient advised regarding possible photosensitivity and discoloration of the teeth, skin, lips, tongue and gums.  Patient instructed to avoid sunlight, if possible.  When exposed to sunlight, patients should wear protective clothing, sunglasses, and sunscreen.  The patient was instructed to call the office immediately if the following severe adverse effects occur:  hearing changes, easy bruising/bleeding, severe headache, or vision changes.  The patient verbalized understanding of the proper use and possible adverse effects of minocycline.  All of the patient's questions and concerns were addressed. Doxycycline Counseling:  Patient counseled regarding possible photosensitivity and increased risk for sunburn.  Patient instructed to avoid sunlight, if possible.  When exposed to sunlight, patients should wear protective clothing, sunglasses, and sunscreen.  The patient was instructed to call the office immediately if the following severe adverse effects occur:  hearing changes, easy bruising/bleeding, severe headache, or vision changes.  The patient verbalized understanding of the proper use and possible adverse effects of doxycycline.  All of the patient's questions and concerns were addressed. Topical Clindamycin Counseling: Patient counseled that this medication may cause skin irritation or allergic reactions.  In the event of skin irritation, the patient was advised to reduce the amount of the drug applied or use it less frequently.   The patient verbalized understanding of the proper use and possible adverse effects of clindamycin.  All of the patient's questions and concerns were addressed. Erythromycin Pregnancy And Lactation Text: This medication is Pregnancy Category B and is considered safe during pregnancy. It is also excreted in breast milk. Tazorac Counseling:  Patient advised that medication is irritating and drying.  Patient may need to apply sparingly and wash off after an hour before eventually leaving it on overnight.  The patient verbalized understanding of the proper use and possible adverse effects of tazorac.  All of the patient's questions and concerns were addressed. Spironolactone Counseling: Patient advised regarding risks of diarrhea, abdominal pain, hyperkalemia, birth defects (for female patients), liver toxicity and renal toxicity. The patient may need blood work to monitor liver and kidney function and potassium levels while on therapy. The patient verbalized understanding of the proper use and possible adverse effects of spironolactone.  All of the patient's questions and concerns were addressed. Detail Level: Detailed Erythromycin Counseling:  I discussed with the patient the risks of erythromycin including but not limited to GI upset, allergic reaction, drug rash, diarrhea, increase in liver enzymes, and yeast infections. High Dose Vitamin A Pregnancy And Lactation Text: High dose vitamin A therapy is contraindicated during pregnancy and breast feeding. High Dose Vitamin A Counseling: Side effects reviewed, pt to contact office should one occur. Topical Sulfur Applications Pregnancy And Lactation Text: This medication is Pregnancy Category C and has an unknown safety profile during pregnancy. It is unknown if this topical medication is excreted in breast milk. Isotretinoin Pregnancy And Lactation Text: This medication is Pregnancy Category X and is considered extremely dangerous during pregnancy. It is unknown if it is excreted in breast milk. Benzoyl Peroxide Counseling: Patient counseled that medicine may cause skin irritation and bleach clothing.  In the event of skin irritation, the patient was advised to reduce the amount of the drug applied or use it less frequently.   The patient verbalized understanding of the proper use and possible adverse effects of benzoyl peroxide.  All of the patient's questions and concerns were addressed.

## 2024-04-24 ENCOUNTER — MYC MEDICAL ADVICE (OUTPATIENT)
Dept: FAMILY MEDICINE | Facility: OTHER | Age: 74
End: 2024-04-24

## 2024-04-24 ENCOUNTER — TELEPHONE (OUTPATIENT)
Dept: CARDIOLOGY | Facility: CLINIC | Age: 74
End: 2024-04-24

## 2024-04-24 DIAGNOSIS — I25.10 CAD (CORONARY ARTERY DISEASE): Primary | ICD-10-CM

## 2024-04-24 RX ORDER — NITROGLYCERIN 0.4 MG/1
TABLET SUBLINGUAL
Qty: 30 TABLET | Refills: 0 | Status: SHIPPED | OUTPATIENT
Start: 2024-04-24

## 2024-04-24 NOTE — TELEPHONE ENCOUNTER
Patient was admitted to [facility] on 4/23/24 with recent onset of significant exertional chest discomfort, extensive coronary calcification on CT, and abnormal KERMIT, here for coronary angiography +/- PCI.     4/23/24: Coronary angiogram via RRA resulted in:    Culprit lesion is a 99% stenosis of OM1, treated with HD IVUS guided PCI and placement of overlapping Synergy AMBREEN (2.5 x 20 mm, 2.5 x 16 mm), postdilated up to 2.75 mm proximally.  Elsewhere, there is a 60% stenosis of the proximal LCx at the bifurcation with OM1, with a minimal luminal area on IVUS of about 4.2 mm .  This is severely calcified and PCI would require calcium modification (at least shockwave, possibly atherectomy).    The mid LAD is severely calcified with a roughly 50% stenosis.     Given that the OM1 lesion was the clear culprit for the patient's symptoms, and PCI of either the LCx/OM1 bifurcation or the mid LAD would require extensive calcium modification, would recommend medical management of her other coronary disease for now.  If refractory angina persists despite OM1 PCI and optimal medical therapy, could return to the Cath Lab for IFR/FFR of the proximal LCx and mid LAD lesions, with complex PCI as appropriate.     Pt was started on Plavix. PTA ASA continued at time of discharge.    Called patient to discuss any post hospital d/c questions, review medication changes, and confirm f/u appts. Patient denied any questions regarding new medications or changes to PTA medications.     RN confirmed with patient that she was d/c with an adequate supply of the antiplatelet Plavix, and reminded of importance of taking without interruption.     Pt DOES NOT have an Rx for PRN SL Nitroglycerin. Per chart review, no known allergy to nitrates. Pt denies any use of Phosphodiesterase Type 5 Inhibitors. Writer instructed pt on PRN SL Nitroglycerin, including indications, storage and expiration period once bottle opened, administration, expected side effects and  when to call the clinic vs 911. Pt verbalized understanding and RX escribed to pt's [facility] per her request.     Patient denied any SOB, chest pain or light headedness.    RRA cardiac cath site is without bleeding, swelling, redness or signs of infection.     RN confirmed with patient that she is scheduled for labs on 4/29/24 at 0830 and an OV on 5/7/24 at 1115 with Dr. Aguirre both at our [place] [facility]. Pt asking if she still needs labs drawn on 4/29/24, as was scheduled PTA. Will route this note to Dr. Aguirre and the [place] [facility] for further review and follow up. [place] [facility] RN phone number provided.    Cardiac rehab is scheduled on 5/9/24 at 0745 in [place].    Patient advised to call clinic with any cardiac related questions or concerns prior to this janeth't. Patient verbalized understanding and agreed with plan. TOMMY Danielle RN.

## 2024-04-25 ENCOUNTER — OFFICE VISIT (OUTPATIENT)
Dept: FAMILY MEDICINE | Facility: OTHER | Age: 74
End: 2024-04-25
Payer: COMMERCIAL

## 2024-04-25 VITALS
WEIGHT: 144 LBS | BODY MASS INDEX: 23.99 KG/M2 | SYSTOLIC BLOOD PRESSURE: 124 MMHG | TEMPERATURE: 98.3 F | HEART RATE: 90 BPM | RESPIRATION RATE: 14 BRPM | DIASTOLIC BLOOD PRESSURE: 66 MMHG | OXYGEN SATURATION: 94 % | HEIGHT: 65 IN

## 2024-04-25 DIAGNOSIS — M54.16 LUMBAR RADICULOPATHY: ICD-10-CM

## 2024-04-25 DIAGNOSIS — Z79.899 ENCOUNTER FOR LONG-TERM (CURRENT) USE OF MEDICATIONS: ICD-10-CM

## 2024-04-25 DIAGNOSIS — D70.1 CHEMOTHERAPY-INDUCED NEUTROPENIA (H): ICD-10-CM

## 2024-04-25 DIAGNOSIS — G89.4 CHRONIC PAIN SYNDROME: ICD-10-CM

## 2024-04-25 DIAGNOSIS — D69.6 THROMBOCYTOPENIA (H): ICD-10-CM

## 2024-04-25 DIAGNOSIS — Z29.11 NEED FOR VACCINATION AGAINST RESPIRATORY SYNCYTIAL VIRUS: ICD-10-CM

## 2024-04-25 DIAGNOSIS — I25.10 CORONARY ARTERY DISEASE INVOLVING NATIVE CORONARY ARTERY OF NATIVE HEART WITHOUT ANGINA PECTORIS: ICD-10-CM

## 2024-04-25 DIAGNOSIS — Z00.00 MEDICARE ANNUAL WELLNESS VISIT, SUBSEQUENT: Primary | ICD-10-CM

## 2024-04-25 DIAGNOSIS — E21.5 PARATHYROID ABNORMALITY (H): ICD-10-CM

## 2024-04-25 DIAGNOSIS — Z23 NEED FOR SHINGLES VACCINE: ICD-10-CM

## 2024-04-25 DIAGNOSIS — K21.9 GASTROESOPHAGEAL REFLUX DISEASE, UNSPECIFIED WHETHER ESOPHAGITIS PRESENT: ICD-10-CM

## 2024-04-25 DIAGNOSIS — T45.1X5A CHEMOTHERAPY-INDUCED NEUTROPENIA (H): ICD-10-CM

## 2024-04-25 DIAGNOSIS — M48.061 FORAMINAL STENOSIS OF LUMBAR REGION: ICD-10-CM

## 2024-04-25 DIAGNOSIS — Z79.899 CONTROLLED SUBSTANCE AGREEMENT SIGNED: ICD-10-CM

## 2024-04-25 DIAGNOSIS — Z93.2 ILEOSTOMY STATUS (H): ICD-10-CM

## 2024-04-25 DIAGNOSIS — Z23 NEED FOR PROPHYLACTIC VACCINATION AGAINST HEPATITIS A: ICD-10-CM

## 2024-04-25 DIAGNOSIS — D63.0 ANEMIA IN NEOPLASTIC DISEASE: ICD-10-CM

## 2024-04-25 DIAGNOSIS — M51.369 DDD (DEGENERATIVE DISC DISEASE), LUMBAR: ICD-10-CM

## 2024-04-25 DIAGNOSIS — F43.21 ADJUSTMENT DISORDER WITH DEPRESSED MOOD: ICD-10-CM

## 2024-04-25 DIAGNOSIS — E03.9 ACQUIRED HYPOTHYROIDISM: ICD-10-CM

## 2024-04-25 DIAGNOSIS — I77.9 CAROTID ARTERY DISEASE WITHOUT CEREBRAL INFARCTION (H): ICD-10-CM

## 2024-04-25 DIAGNOSIS — I10 HYPERTENSION GOAL BP (BLOOD PRESSURE) < 140/90: ICD-10-CM

## 2024-04-25 DIAGNOSIS — C50.412 MALIGNANT NEOPLASM OF UPPER-OUTER QUADRANT OF LEFT BREAST IN FEMALE, ESTROGEN RECEPTOR NEGATIVE (H): ICD-10-CM

## 2024-04-25 DIAGNOSIS — C77.5 SECONDARY AND UNSPECIFIED MALIGNANT NEOPLASM OF INTRAPELVIC LYMPH NODES (H): ICD-10-CM

## 2024-04-25 DIAGNOSIS — Z17.1 MALIGNANT NEOPLASM OF UPPER-OUTER QUADRANT OF LEFT BREAST IN FEMALE, ESTROGEN RECEPTOR NEGATIVE (H): ICD-10-CM

## 2024-04-25 PROBLEM — E78.5 HYPERLIPIDEMIA LDL GOAL <100: Status: ACTIVE | Noted: 2017-05-30

## 2024-04-25 LAB
CANNABINOIDS UR QL SCN: NORMAL
CREAT UR-MCNC: 153 MG/DL

## 2024-04-25 PROCEDURE — 90677 PCV20 VACCINE IM: CPT | Performed by: PHYSICIAN ASSISTANT

## 2024-04-25 PROCEDURE — G0439 PPPS, SUBSEQ VISIT: HCPCS | Performed by: PHYSICIAN ASSISTANT

## 2024-04-25 PROCEDURE — G0481 DRUG TEST DEF 8-14 CLASSES: HCPCS | Mod: 59 | Performed by: PHYSICIAN ASSISTANT

## 2024-04-25 PROCEDURE — G0009 ADMIN PNEUMOCOCCAL VACCINE: HCPCS | Performed by: PHYSICIAN ASSISTANT

## 2024-04-25 PROCEDURE — 80307 DRUG TEST PRSMV CHEM ANLYZR: CPT | Performed by: PHYSICIAN ASSISTANT

## 2024-04-25 PROCEDURE — 99214 OFFICE O/P EST MOD 30 MIN: CPT | Mod: 25 | Performed by: PHYSICIAN ASSISTANT

## 2024-04-25 RX ORDER — PANTOPRAZOLE SODIUM 40 MG/1
40 TABLET, DELAYED RELEASE ORAL DAILY
Qty: 90 TABLET | Refills: 3 | Status: SHIPPED | OUTPATIENT
Start: 2024-04-25

## 2024-04-25 RX ORDER — OXYCODONE HYDROCHLORIDE 5 MG/1
5 TABLET ORAL EVERY 6 HOURS PRN
Qty: 30 TABLET | Refills: 0 | Status: SHIPPED | OUTPATIENT
Start: 2024-04-25 | End: 2024-05-23

## 2024-04-25 RX ORDER — ESCITALOPRAM OXALATE 10 MG/1
10 TABLET ORAL DAILY
Qty: 90 TABLET | Refills: 3 | Status: SHIPPED | OUTPATIENT
Start: 2024-04-25 | End: 2024-07-26

## 2024-04-25 RX ORDER — RESPIRATORY SYNCYTIAL VIRUS VACCINE 120MCG/0.5
0.5 KIT INTRAMUSCULAR ONCE
Qty: 1 EACH | Refills: 0 | Status: CANCELLED | OUTPATIENT
Start: 2024-04-25 | End: 2024-04-25

## 2024-04-25 SDOH — HEALTH STABILITY: PHYSICAL HEALTH: ON AVERAGE, HOW MANY MINUTES DO YOU ENGAGE IN EXERCISE AT THIS LEVEL?: 100 MIN

## 2024-04-25 SDOH — HEALTH STABILITY: PHYSICAL HEALTH: ON AVERAGE, HOW MANY DAYS PER WEEK DO YOU ENGAGE IN MODERATE TO STRENUOUS EXERCISE (LIKE A BRISK WALK)?: 5 DAYS

## 2024-04-25 ASSESSMENT — SOCIAL DETERMINANTS OF HEALTH (SDOH): HOW OFTEN DO YOU GET TOGETHER WITH FRIENDS OR RELATIVES?: ONCE A WEEK

## 2024-04-25 NOTE — PROGRESS NOTES
Preventive Care Visit  [facility]  Phani Albright PA-C, Family Medicine  Apr 25, 2024      Assessment & Plan   Medicare wellness exam subsequent  73-year-old female here for Medicare care wellness exam.    Chronic pain syndrome  Controlled substance agreement signed  Foraminal stenosis of lumbar region  Lumbar radiculopathy  DDD (degenerative disc disease), lumbar   minimal medicine needed at this point in time.  She does fine with just 1 tablet in the morning to get herself going during the day.  Discussed controlled substance agreement and she signed this readily.  Labs pending.  Follow-up in 6 months is recommended.  - [id number] - Urine Drug Confirmation Panel (Comprehensive); Future  - oxyCODONE (ROXICODONE) 5 MG tablet; Take 1 tablet (5 mg) by mouth every 6 hours as needed for moderate to severe pain  - [id number] - Urine Drug Confirmation Panel (Comprehensive); Future  - [id number] - Urine Drug Confirmation Panel (Comprehensive)    Adjustment disorder with depressed mood  Has been able to get by with 20 mg q. other day.  Advised that we move her to just 10 mg on a daily basis for control and follow-up in 6 months.  - escitalopram (LEXAPRO) 10 MG tablet; Take 1 tablet (10 mg) by mouth daily    Gastroesophageal reflux disease, unspecified whether esophagitis present  Due to being on Plavix can no longer be on Prilosec.  Advised medication as noted below and follow-up as needed.  - pantoprazole (PROTONIX) 40 MG EC tablet; Take 1 tablet (40 mg) by mouth daily    Coronary artery disease involving native coronary artery of native heart without angina pectoris  Carotid artery disease without cerebral infarction (H24)  Hypertension goal BP (blood pressure) < 140/90  Recent cardiovascular stenting done.  Follow-up with cardiology in the near future.      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  Doing as well as can be expected at this point in time.    Acquired hypothyroidism  Parathyroid abnormality (H24)  Historically noted with no new concerns.  Follow-up as needed.    Encounter for long-term (current) use of medications  - [id number] - Urine Drug Confirmation Panel (Comprehensive); Future    Need for shingles vaccine  - zoster vaccine recombinant adjuvanted (SHINGRIX) injection; Inject 0.5 mLs into the muscle once for 1 dose Pharmacist administered    Need for prophylactic vaccination against hepatitis A  Need for vaccination against respiratory syncytial virus  Advise consideration of these in the near future.     Counseling  Appropriate preventive services were discussed with this patient, including applicable screening as appropriate for fall prevention, nutrition, physical activity, Tobacco-use cessation, weight loss and cognition.  Checklist reviewing preventive services available has been given to the patient.  Reviewed patient's diet, addressing concerns and/or questions.   Discussed possible causes of fatigue. I have reviewed Opioid Use Disorder and Substance Use Disorder risk factors and made any needed referrals.       Work on weight loss  Regular exercise    Bishop Alcaraz is a 73 year old, presenting for the following:  Physical        4/25/2024    11:03 AM   Additional Questions   Roomed by Blanca STANLEY   Accompanied by self     Health Care Directive  Patient does not have a Health Care Directive or Living Will: Discussed advance care planning with patient; information given to patient to review.    HPI      4/25/2024   General Health   How would you rate your overall physical health? (!) FAIR   Feel stress (tense, anxious, or unable to sleep) To some extent   (!) STRESS  CONCERN      4/25/2024   Nutrition   Diet: Regular (no restrictions)         4/25/2024   Exercise   Days per week of moderate/strenous exercise 5 days   Average minutes spent exercising at this level 100 min         4/25/2024   Social Factors   Frequency of gathering with friends or relatives Once a week   Worry food won't last until get money to buy more No   Food not last or not have enough money for food? No   Do you have housing?  Yes   Are you worried about losing your housing? No   Lack of transportation? No   Unable to get utilities (heat,electricity)? No         4/25/2024   Fall Risk   Fallen 2 or more times in the past year? Yes   Trouble with walking or balance? Yes   Gait Speed Test (Document in seconds) 3.37   Gait Speed Test Interpretation Less than or equal to 5.00 seconds - PASS          4/25/2024   Activities of Daily Living- Home Safety   Needs help with the following daily activites None of the above   Safety concerns in the home None of the above         4/25/2024   Dental   Dentist two times every year? Yes         4/25/2024   Hearing Screening   Hearing concerns? None of the above         4/25/2024   Driving Risk Screening   Patient/family members have concerns about driving No         4/25/2024   General Alertness/Fatigue Screening   Have you been more tired than usual lately? (!) YES         4/25/2024   Urinary Incontinence Screening   Bothered by leaking urine in past 6 months No         4/25/2024   TB Screening   Were you born outside of the [place]? No         Today's PHQ-2 Score:       4/25/2024    11:09 AM   PHQ-2 ( 1999 [employer])   Q1: Little interest or pleasure in doing things 0   Q2: Feeling down, depressed or hopeless 0   PHQ-2 Score 0   Q1: Little interest or pleasure in doing things Not at all   Q2: Feeling down, depressed or hopeless Not at all   PHQ-2 Score 0           4/25/2024   Substance Use   Alcohol more than 3/day or more than 7/wk No   Do you have a current opioid prescription?  (!) YES   How severe/bad is pain from 1 to 10? 5/10   Do you use any other substances recreationally? (!) ALCOHOL     Social History     Tobacco Use    Smoking status: Former     Current packs/day: 0.00     Average packs/day: 3.0 packs/day for 10.0 years (30.0 ttl pk-yrs)     Types: Cigarettes     Start date: 1969     Quit date: 1979     Years since quittin.4     Passive exposure: Never    Smokeless tobacco: Never    Tobacco comments:     approx. 3 packs daily   Vaping Use    Vaping status: Never Used   Substance Use Topics    Alcohol use: Not Currently     Comment: Stopped drinking a few weeks ago (2023)    Drug use: No          Mammogram Screening - Mammography discussed, not appropriate due to simple mastectomy however following up as discussed with her specialist would be wise..  Surgical history and/or SOGI form updated.    ASCVD Risk   The 10-year ASCVD risk score (Handy RAZA, et al., 2019) is: 18.2%    Values used to calculate the score:      Age: 73 years      Sex: Female      Is Non- : No      Diabetic: No      Tobacco smoker: No      Systolic Blood Pressure: 124 mmHg      Is BP treated: Yes      HDL Cholesterol: 72 mg/dL      Total Cholesterol: 233 mg/dL        Reviewed and updated as needed this visit by Provider                    I  Past Medical History:   Diagnosis Date    Acute kidney injury (H24)     Carotid stenosis, bilateral L80%, R40% 2020    Central stenosis of spinal canal 2017    lumbar     DDD (degenerative disc disease), lumbar 2017    Depressive disorder, not elsewhere classified     Esophageal reflux     ETOH abuse     in remission    Foraminal stenosis of lumbar region 2017    Complete lumbar spine left at various degrees and to a lesser extent the right as well.    Lumbar radiculopathy 2017    Personal history of malignant neoplasm of breast     Prophylactic antibiotic for dental procedure indicated due to  prior joint replacement 06/05/2017    S/P reverse total shoulder arthroplasty, right 06/05/2017    Subdural hematoma (H)     Thyroid disease     Urothelial carcinoma (H)      Past Surgical History:   Procedure Laterality Date    ARTHROPLASTY SHOULDER Right 11/17/2015    ARTHROSCOPY KNEE  6/7/2012    Procedure:ARTHROSCOPY KNEE; right knee arthroscopy with partial lateral menisectomy; Surgeon:DAMIÁN MCALLISTER; [facility]    BIOPSY VAGINAL N/A 10/27/2021    Procedure: DISTAL URETHRECTOMY;  Surgeon: Leonardo Robles MD;  Location: [facility] OR    COLONOSCOPY N/A 12/22/2017    Procedure: COLONOSCOPY;  colonoscopy;  Surgeon: Chuck Chand MD;  [facility]: [facility]    CV CORONARY ANGIOGRAM N/A 4/23/2024    Procedure: Coronary Angiogram;  Surgeon: Leonardo Marinelli MD;  Location: [facility] CARDIAC CATH LAB    CV INTRAVASULAR ULTRASOUND N/A 4/23/2024    Procedure: Intravascular Ultrasound;  Surgeon: Leonardo Marinelli MD;  [facility]    CV LEFT HEART CATH N/A 4/23/2024    Procedure: Left Heart Catheterization;  Surgeon: Leonardo Marinelli MD;  Location: [facility] CARDIAC CATH LAB    CV PCI STENT DRUG ELUTING N/A 4/23/2024    Procedure: Percutaneous Coronary Intervention Stent;  Surgeon: Leonardo Marinelli MD;  Location:  [facility] CARDIAC CATH LAB    CYSTECTOMY BLADDER RADICAL, ILEAL DIVERSION, COMBINED N/A 6/1/2021    Procedure: RADICAL CYSTECTOMY  WITH ILEAL CONDUIT CREATION,BILATERAL PELVIC LYMPHADENECTOMY;  Surgeon: Leonardo Robles MD;  [facility]    CYSTOSCOPY, RETROGRADES, COMBINED Bilateral 3/18/2021    Procedure: CYSTOSCOPY, WITH RETROGRADE PYELOGRAM;  Surgeon: Girish Jack MD;  [facility]: [facility]    CYSTOSCOPY, TRANSURETHRAL RESECTION (TUR) TUMOR BLADDER, COMBINED N/A 3/18/2021    Procedure: CYSTOSCOPY, WITH TRANSURETHRAL RESECTION BLADDER TUMOR;  Surgeon: Girish Jack MD;  [facility]: [facility]    ENDARTERECTOMY CAROTID Left 10/8/2020     Procedure: LEFT CAROTID ENDARTERECTOMY WITH EEG;  Surgeon: Lacho Jasmine MD;  [facility]: [facility]    ESOPHAGOSCOPY, GASTROSCOPY, DUODENOSCOPY (EGD), COMBINED N/A 6/20/2022    Procedure: ESOPHAGOGASTRODUODENOSCOPY, WITH BIOPSY;  Surgeon: Ramses Arenas MD;  [facility]: [facility]    EXCISE MASS AXILLA Left 9/16/2016    Procedure: EXCISE MASS AXILLA;  Surgeon: Keyon Blanco MD;  [facility]    HC REMV CATARACT EXTRACAP,INSERT LENS, W/O ECP  6/25/2009    Right eye    INJECT EPIDURAL LUMBAR N/A 4/11/2019    Procedure: interlaminar epidual steroid injection lumbar 4-5;  Surgeon: Oksar Salvador MD;  [facility]    INJECT EPIDURAL LUMBAR Bilateral 9/12/2019    Procedure: lumbar 4-5 epidural interlaminar steroid injection;  Surgeon: Oskar Salvador MD;  [facility]    INSERT PORT VASCULAR ACCESS Right 10/7/2016    Procedure: INSERT PORT VASCULAR ACCESS;  Surgeon: Keyon Blanco MD;  [facility]    IR CHEST PORT PLACEMENT > 5 YRS OF AGE  4/16/2021    MASTECTOMY SIMPLE BILATERAL Bilateral 2/8/2017    Procedure: MASTECTOMY SIMPLE BILATERAL;  Surgeon: Keyon Blanco MD;  [facility]    OPEN REDUCTION INTERNAL FIXATION FOOT Right 3/20/2015    Procedure: OPEN REDUCTION INTERNAL FIXATION FOOT;  Surgeon: Javi Herrmann DPM;  [facility]    OPTICAL TRACKING SYSTEM FUSION SPINE POSTERIOR LUMBAR TWO LEVELS N/A 2/4/2020    Procedure: LUMBAR 2-SACRAL1 TRANSFORMINAL INTERBODY FUSION;  Surgeon: Lenny Gomes MD;  Location:  OR    REMOVE PORT VASCULAR ACCESS Right 2/14/2018    Procedure: REMOVE PORT VASCULAR ACCESS;  right intra jugular port removal;  Surgeon: Keyon Blanco MD;  [facility]    SHOULDER SURGERY Right 11/2015    ZZC LIGATE FALLOPIAN TUBE      ZZC NONSPECIFIC PROCEDURE  1956    ankle fracture surgery     Lab work is in process  Labs reviewed in EPIC  BP Readings from Last 3 Encounters:   04/25/24 124/66   04/23/24 (!) 174/82   04/15/24 112/54    Wt Readings from Last 3  Encounters:   04/25/24 65.3 kg (144 lb)   04/23/24 66.2 kg (146 lb)   04/15/24 67.1 kg (148 lb)                  Patient Active Problem List   Diagnosis    Enthesopathy of hip region    Family history of stroke (cerebrovascular)    Trochanteric bursitis    Advanced directives, counseling/discussion    Health Care Home    Hypertension goal BP (blood pressure) < 140/90    Baker's cyst of knee    Patella-femoral syndrome    Adjustment disorder with depressed mood    Malignant neoplasm of upper-outer quadrant of left breast in female, estrogen receptor negative (H)    Encounter for antineoplastic chemotherapy    S/P bilateral mastectomy    Anxiety    Hyperlipidemia LDL goal <100    S/P reverse total shoulder arthroplasty, right    Prophylactic antibiotic for dental procedure indicated due to prior joint replacement    Chronic pain syndrome    Lumbar radiculopathy    Foraminal stenosis of lumbar region    Central stenosis of spinal canal    DDD (degenerative disc disease), lumbar    Stage 3a chronic kidney disease (H)    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)    Magallanes's neuroma of right foot    Bilateral impacted cerumen    S/P lumbar fusion    Anemia    Carotid stenosis, bilateral L80%, R40%    Carotid artery plaque, bilateral    POSSIBLE Right internal carotid artery aneurysm    Symptomatic stenosis of left carotid artery    Personal history of malignant neoplasm of breast    Acute cystitis with hematuria    Benign paroxysmal positional vertigo, bilateral    Personal history of malignant neoplasm of bladder    Malignant neoplasm of overlapping sites of bladder (H)    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)    Chemotherapy-induced neutropenia (H24)    Thrombocytopenia (H24)    Anemia in neoplastic disease    Constipation, unspecified constipation type    Imaging abnormalities    Hypomagnesemia    Need for prophylactic vaccination and inoculation against influenza     Rheumatoid arthritis of multiple sites with negative rheumatoid factor (H)    Parathyroid abnormality (H24)    Arthritis of shoulder region, right    Osteoarthrosis, hip    Tinnitus of right ear    Status of artificial opening of urinary tract (H)    Status post ileal conduit (H)    Chronic fatigue syndrome    Vision changes    Balance problems    Skin sensation disturbance    Ileostomy status (H)    Acquired hypothyroidism    Hypothyroidism due to medication    Pulmonary nodules    F11.2 - Continuous opioid dependence (H)    Chemotherapy-induced neuropathy (H24)    Unspecified severe protein-calorie malnutrition (H24)    Imbalance    Carotid artery disease without cerebral infarction (H24)     Past Surgical History:   Procedure Laterality Date    ARTHROPLASTY SHOULDER Right 11/17/2015    ARTHROSCOPY KNEE  6/7/2012    Procedure:ARTHROSCOPY KNEE; right knee arthroscopy with partial lateral menisectomy; Surgeon:DAMIÁN MCALLISTER; [facility]    BIOPSY VAGINAL N/A 10/27/2021    Procedure: DISTAL URETHRECTOMY;  Surgeon: Leonardo Robles MD;  Location: [facility] OR    COLONOSCOPY N/A 12/22/2017    Procedure: COLONOSCOPY;  colonoscopy;  Surgeon: Chuck Chand MD;  [facility]    CV CORONARY ANGIOGRAM N/A 4/23/2024    Procedure: Coronary Angiogram;  Surgeon: Leonardo Marinelli MD;  Location: [facility] CARDIAC CATH LAB    CV INTRAVASULAR ULTRASOUND N/A 4/23/2024    Procedure: Intravascular Ultrasound;  Surgeon: Leonardo Marinelli MD;  Location: [facility] CARDIAC CATH LAB    CV LEFT HEART CATH N/A 4/23/2024    Procedure: Left Heart Catheterization;  Surgeon: Leonardo Marinelli MD;  Location: [facility] CARDIAC CATH LAB    CV PCI STENT DRUG ELUTING N/A 4/23/2024    Procedure: Percutaneous Coronary Intervention Stent;  Surgeon: Leonardo Marinelli MD;  Location:  HE[facility] CARDIAC CATH LAB    CYSTECTOMY BLADDER RADICAL, ILEAL DIVERSION, COMBINED N/A 6/1/2021    Procedure: RADICAL CYSTECTOMY   WITH ILEAL CONDUIT CREATION,BILATERAL PELVIC LYMPHADENECTOMY;  Surgeon: Leonardo Robles MD;  [facility]    CYSTOSCOPY, RETROGRADES, COMBINED Bilateral 3/18/2021    Procedure: CYSTOSCOPY, WITH RETROGRADE PYELOGRAM;  Surgeon: Girish Jack MD;  [facility]    CYSTOSCOPY, TRANSURETHRAL RESECTION (TUR) TUMOR BLADDER, COMBINED N/A 3/18/2021    Procedure: CYSTOSCOPY, WITH TRANSURETHRAL RESECTION BLADDER TUMOR;  Surgeon: Girish Jack MD;  [facility]    ENDARTERECTOMY CAROTID Left 10/8/2020    Procedure: LEFT CAROTID ENDARTERECTOMY WITH EEG;  Surgeon: Lacho Jasmine MD;  [facility]    ESOPHAGOSCOPY, GASTROSCOPY, DUODENOSCOPY (EGD), COMBINED N/A 6/20/2022    Procedure: ESOPHAGOGASTRODUODENOSCOPY, WITH BIOPSY;  Surgeon: Ramses Arenas MD;  [facility]    EXCISE MASS AXILLA Left 9/16/2016    Procedure: EXCISE MASS AXILLA;  Surgeon: Keyon Blanco MD;  [facility]    HC REMV CATARACT EXTRACAP,INSERT LENS, W/O ECP  6/25/2009    Right eye    INJECT EPIDURAL LUMBAR N/A 4/11/2019    Procedure: interlaminar epidual steroid injection lumbar 4-5;  Surgeon: Oskar Salvador MD;  [facility]    INJECT EPIDURAL LUMBAR Bilateral 9/12/2019    Procedure: lumbar 4-5 epidural interlaminar steroid injection;  Surgeon: Oskar Salvador MD;  [facility]    INSERT PORT VASCULAR ACCESS Right 10/7/2016    Procedure: INSERT PORT VASCULAR ACCESS;  Surgeon: Keyon Blanco MD;  [facility]    IR CHEST PORT PLACEMENT > 5 YRS OF AGE  4/16/2021    MASTECTOMY SIMPLE BILATERAL Bilateral 2/8/2017    Procedure: MASTECTOMY SIMPLE BILATERAL;  Surgeon: Keyon Blanco MD;  [facility]    OPEN REDUCTION INTERNAL FIXATION FOOT Right 3/20/2015    Procedure: OPEN REDUCTION INTERNAL FIXATION FOOT;  Surgeon: Javi Herrmann DPM;  [facility]    OPTICAL TRACKING SYSTEM FUSION SPINE POSTERIOR LUMBAR TWO LEVELS N/A 2/4/2020    Procedure: LUMBAR 2-SACRAL1 TRANSFORMINAL INTERBODY FUSION;   "Surgeon: Lenny Gomes MD;  [facility]    REMOVE PORT VASCULAR ACCESS Right 2018    Procedure: REMOVE PORT VASCULAR ACCESS;  right intra jugular port removal;  Surgeon: Keyon Blanco MD;  [facility]    SHOULDER SURGERY Right 2015    Z LIGATE FALLOPIAN TUBE      Z NONSPECIFIC PROCEDURE      ankle fracture surgery       Social History     Tobacco Use    Smoking status: Former     Current packs/day: 0.00     Average packs/day: 3.0 packs/day for 10.0 years (30.0 ttl pk-yrs)     Types: Cigarettes     Start date: 1969     Quit date: 1979     Years since quittin.4     Passive exposure: Never    Smokeless tobacco: Never    Tobacco comments:     approx. 3 packs daily   Substance Use Topics    Alcohol use: Not Currently     Comment: Stopped drinking a few weeks ago (2023)     Family History   Problem Relation Age of Onset    Hypertension Mother     Thyroid Disease Mother     Cerebrovascular Disease Mother     Hypertension Father     Cerebrovascular Disease Father     Cancer Father         skin    Thyroid Disease Sister     Diabetes Brother         type 2    Depression Sister     Breast Cancer Sister         age 47    Cancer Sister         skin    Cancer Brother         inside nose         Current Outpatient Medications   Medication Sig Dispense Refill    acetaminophen (TYLENOL) 500 MG tablet Take 1,000 mg by mouth 3 times daily as needed for mild pain (500MG X 2 = 1,000MG)      aspirin (ASA) 81 MG chewable tablet Take 1 tablet (81 mg) by mouth daily 100 tablet 3    atorvastatin (LIPITOR) 40 MG tablet Take 2 tablets (80 mg) by mouth daily 180 tablet 3    carvedilol (COREG) 6.25 MG tablet TAKE 1 TABLET (6.25 MG) BY MOUTH 2 TIMES DAILY (WITH MEALS) PLEASE SEND NEXT REFILL TO PRIMARY CARE AS NEPHROLOGY FOLLOW UP \"AS NEEDED\" 90 tablet 0    clopidogrel (PLAVIX) 75 MG tablet Take 1 tablet (75 mg) by mouth daily 90 tablet 3    Cyanocobalamin (B-12 PO) Take 1 tablet by mouth daily "      escitalopram (LEXAPRO) 10 MG tablet Take 1 tablet (10 mg) by mouth daily 90 tablet 3    ezetimibe (ZETIA) 10 MG tablet Take 1 tablet (10 mg) by mouth daily 90 tablet 3    famotidine (PEPCID) 20 MG tablet Take 1 tablet (20 mg) by mouth 2 times daily 30 tablet 0    gabapentin (NEURONTIN) 300 MG capsule Take 2 capsules (600 mg) by mouth 3 times daily 180 capsule 5    levothyroxine (SYNTHROID/LEVOTHROID) 25 MCG tablet TAKE 1 TABLET BY MOUTH EVERY DAY (Patient taking differently: Take 25 mcg by mouth daily) 90 tablet 1    LORazepam (ATIVAN) 0.5 MG tablet TAKE 1 TABLET (0.5 MG) BY MOUTH EVERY 4 HOURS AS NEEDED FOR ANXIETY, NAUSEA OR SLEEP 25 tablet 0    meclizine (ANTIVERT) 25 MG tablet Take 12.5 mg by mouth every 6 hours as needed for dizziness 1/2 tablet      nitroGLYcerin (NITROSTAT) 0.4 MG sublingual tablet For chest pain place 1 tablet under the tongue every 5 minutes for 3 doses. If symptoms persist 5 minutes after 2nd dose call 911. 30 tablet 0    omeprazole (PRILOSEC) 40 MG DR capsule Take 40 mg by mouth daily      oxyCODONE (ROXICODONE) 5 MG tablet Take 1 tablet (5 mg) by mouth every 6 hours as needed for moderate to severe pain 30 tablet 0    pantoprazole (PROTONIX) 40 MG EC tablet Take 1 tablet (40 mg) by mouth daily 90 tablet 3    prochlorperazine (COMPAZINE) 10 MG tablet TAKE 1/2 TABLET BY MOUTH EVERY 6 HOURS AS NEEDED FORNAUSEA/VOMITING 30 tablet 2    sucralfate (CARAFATE) 1 GM/10ML suspension Take 10 mLs (1 g) by mouth 4 times daily 100 mL 0    zoster vaccine recombinant adjuvanted (SHINGRIX) injection Inject 0.5 mLs into the muscle once for 1 dose Pharmacist administered 0.5 mL 0     Allergies   Allergen Reactions    Oxybutynin      Patient reports symptoms of nausea and mind fogginess     Recent Labs   Lab Test 04/23/24  0655 03/20/24  1306 01/28/24  1521 11/27/23  1316 09/03/23  1125 07/26/23  1355 07/07/23  1415 11/12/22  1153 09/12/22  1555 11/18/21  0845 09/30/21  1211 07/14/21  0824  06/24/21  1044 06/06/21  0757 12/11/20  1034 10/08/20  0606   A1C  --   --   --   --   --  5.4  --   --   --   --  5.3  --   --   --   --  5.2   LDL  --  116*  --  107*  --   --   --   --  90  --   --   --   --   --   --  87   HDL  --  72  --  84  --   --   --   --  80  --   --   --   --   --   --  76   TRIG  --  224*  --  176*  --   --   --   --  179*  --   --   --   --   --   --  180*   ALT  --  20 27 23   < >  --  14   < >  --    < > 25   < >  --   --    < >  --    CR 0.98* 1.06* 0.91 0.97*   < >  --  1.22*   < >  --    < > 0.91   < > 1.10* 0.79   < > 0.89   GFRESTIMATED 60* 55* 66 61   < >  --  47*   < >  --    < > 64   < > 50* 75   < > 65   GFRESTBLACK  --   --   --   --   --   --   --   --   --   --   --   --  58* 87   < > 75   POTASSIUM 4.5 4.5 3.8 4.2   < >  --  5.2   < >  --    < > 4.3   < > 4.7 3.9   < > 3.5   TSH  --   --   --  2.11  --   --  4.04   < >  --    < > 6.83*  --   --   --   --   --     < > = values in this interval not displayed.      Current providers sharing in care for this patient include:  Patient Care Team:  Girish Sauer MD as PCP - General (Hematology & Oncology)  Leonardo Robles MD as MD (Urology)  Geovanny Tarango MD as MD (Otolaryngology)  Dennis Reed MD as MD (Neurology)  Dennis Reed MD as Assigned Neuroscience Provider  Osmin Craig MD as MD (Cardiovascular Disease)  Girish Sauer MD as Assigned Pediatric Specialist Provider  Columba Walden PA-C as Assigned PCP  Phani Albright PA-C as Assigned Pain Medication Provider  Geneva Knowles APRN CNP as Assigned Cancer Care Provider  Miguelito Aguirre MD as Assigned Heart and Vascular Provider    The following health maintenance items are reviewed in Epic and correct as of today:  Health Maintenance   Topic Date Due    CONTROLLED SUBSTANCE AGREEMENT FOR CHRONIC PAIN MANAGEMENT  Never done    ZOSTER IMMUNIZATION (1 of 2) Never done    HEPATITIS A IMMUNIZATION (1 of 2 - Risk  2-dose series) Never done    RSV VACCINE (Pregnancy & 60+) (1 - 1-dose 60+ series) Never done    MEDICARE ANNUAL WELLNESS VISIT  11/01/2014    Pneumococcal Vaccine: 65+ Years (2 of 2 - PPSV23 or PCV20) 07/31/2018    URINE DRUG SCREEN  06/05/2019    INFLUENZA VACCINE (1) 09/01/2023    COVID-19 Vaccine (4 - 2023-24 season) 09/01/2023    ANNUAL REVIEW OF HM ORDERS  09/12/2023    MICROALBUMIN  07/07/2024    AUSTIN ASSESSMENT  07/07/2024    TSH W/FREE T4 REFLEX  11/27/2024    PHQ-9  02/13/2025    LIPID  03/20/2025    BMP  04/23/2025    HEMOGLOBIN  04/23/2025    FALL RISK ASSESSMENT  04/25/2025    GLUCOSE  04/23/2027    ADVANCE CARE PLANNING  09/12/2027    DTAP/TDAP/TD IMMUNIZATION (3 - Td or Tdap) 09/12/2032    COLORECTAL CANCER SCREENING  12/19/2033    DEXA  01/06/2035    HEPATITIS C SCREENING  Completed    PHQ-2 (once per calendar year)  Completed    URINALYSIS  Completed    IPV IMMUNIZATION  Aged Out    HPV IMMUNIZATION  Aged Out    MENINGITIS IMMUNIZATION  Aged Out    RSV MONOCLONAL ANTIBODY  Aged Out         Review of Systems  CONSTITUTIONAL: NEGATIVE for fever, chills, change in weight  INTEGUMENTARY/SKIN: NEGATIVE for worrisome rashes, moles or lesions  EYES: NEGATIVE for vision changes or irritation  ENT/MOUTH: NEGATIVE for ear, mouth and throat problems  RESP: NEGATIVE for significant cough or SOB  BREAST: NEGATIVE for masses, tenderness or discharge  CV: NEGATIVE for chest pain, palpitations or peripheral edema  GI: NEGATIVE for nausea, abdominal pain, heartburn, or change in bowel habits  : NEGATIVE for frequency, dysuria, or hematuria  MUSCULOSKELETAL: NEGATIVE for significant arthralgias or myalgia  NEURO: NEGATIVE for weakness, dizziness or paresthesias  ENDOCRINE: NEGATIVE for temperature intolerance, skin/hair changes  HEME: NEGATIVE for bleeding problems  PSYCHIATRIC: NEGATIVE for changes in mood or affect     Objective    Exam  /66   Pulse 90   Temp 98.3  F (36.8  C) (Temporal)   Resp 14   Ht  "1.651 m (5' 5\")   Wt 65.3 kg (144 lb)   SpO2 94%   BMI 23.96 kg/m     Estimated body mass index is 23.96 kg/m  as calculated from the following:    Height as of this encounter: 1.651 m (5' 5\").    Weight as of this encounter: 65.3 kg (144 lb).    Physical Exam  GENERAL: alert and no distress  RESP: lungs clear to auscultation - no rales, rhonchi or wheezes  CV: regular rate and rhythm, normal S1 S2, no S3 or S4, no murmur, click or rub, no peripheral edema  ABDOMEN: soft, nontender, no hepatosplenomegaly, no masses and bowel sounds normal  MS: no gross musculoskeletal defects noted, no edema  SKIN: no suspicious lesions or rashes to exposed visible skin today.  NEURO: Normal strength and tone, mentation intact and speech normal  PSYCH: mentation appears normal, affect normal/bright        4/25/2024   Mini Cog   Clock Draw Score 2 Normal   3 Item Recall 3 objects recalled   Mini Cog Total Score 5              Signed Electronically by: Phani Albright PA-C      Prior to immunization administration, verified patients identity using patient s name and date of birth. Please see Immunization Activity for additional information.     Screening Questionnaire for Adult Immunization    Are you sick today?   No   Do you have allergies to medications, food, a vaccine component or latex?   No   Have you ever had a serious reaction after receiving a vaccination?   No   Do you have a long-term health problem with heart, lung, kidney, or metabolic disease (e.g., diabetes), asthma, a blood disorder, no spleen, complement component deficiency, a cochlear implant, or a spinal fluid leak?  Are you on long-term aspirin therapy?   Yes - heart   Do you have cancer, leukemia, HIV/AIDS, or any other immune system problem?   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   No   In the past 3 months, have you taken medications that affect  your immune system, such as " prednisone, other steroids, or anticancer drugs; drugs for the treatment of rheumatoid arthritis, Crohn s disease, or psoriasis; or have you had radiation treatments?   Yes - Prednisone   Have you had a seizure, or a brain or other nervous system problem?   No   During the past year, have you received a transfusion of blood or blood    products, or been given immune (gamma) globulin or antiviral drug?   No   For women: Are you pregnant or is there a chance you could become       pregnant during the next month?   No   Have you received any vaccinations in the past 4 weeks?   No     Immunization questionnaire was positive for at least one answer.  Notified Phani Albright.      Patient instructed to remain in clinic for 15 minutes afterwards, and to report any adverse reactions.     Screening performed by Blanca Mehta CMA on 4/25/2024 at 11:14 AM.

## 2024-04-25 NOTE — LETTER
Opioid / Opioid Plus Controlled Substance Agreement    This is an agreement between you and your provider about the safe and appropriate use of controlled substance/opioids prescribed by your care team. Controlled substances are medicines that can cause physical and mental dependence (abuse).    There are strict laws about having and using these medicines. We here at [facility] are committing to working with you in your efforts to get better. To support you in this work, we ll help you schedule regular office appointments for medicine refills. If we must cancel or change your appointment for any reason, we ll make sure you have enough medicine to last until your next appointment.     As a Provider, I will:  Listen carefully to your concerns and treat you with respect.   Recommend a treatment plan that I believe is in your best interest. This plan may involve therapies other than opioid pain medication.   Talk with you often about the possible benefits, and the risk of harm of any medicine that we prescribe for you.   Provide a plan on how to taper (discontinue or go off) using this medicine if the decision is made to stop its use.    As a Patient, I understand that opioid(s):   Are a controlled substance prescribed by my care team to help me function or work and manage my condition(s).   Are strong medicines and can cause serious side effects such as:  Drowsiness, which can seriously affect my driving ability  A lower breathing rate, enough to cause death  Harm to my thinking ability   Depression   Abuse of and addiction to this medicine  Need to be taken exactly as prescribed. Combining opioids with certain medicines or chemicals (such as illegal drugs, sedatives, sleeping pills, and benzodiazepines) can be dangerous or even fatal. If I stop opioids suddenly, I may have severe withdrawal symptoms.  Do not work for all types of pain nor for all patients. If they re not helpful, I may be asked to stop  them.    I am also being prescribed a benzodiazepine (tranquilizer) controlled substance: I understand this type of medicine is sedating and can increase the risk of death when taken together with opioids. I have talked to my care team about having prescription Narcan available for reversing the opioid medicine and have been instructed in its use. I will be very careful to take my medicines only as directed    The risks, benefits and side effects of these medicine(s) were explained to me. I agree that:  I will take part in other treatments as advised by my care team. This may be psychiatry or counseling, physical therapy, behavioral therapy, group treatment or a referral to a specialist.     I will keep all my appointments. I understand that this is part of the monitoring of opioids. My care team may require an office visit for EVERY opioid/controlled substance refill. If I miss appointments or don t follow instructions, my care team may stop my medicine.    I will take my medicines as prescribed. I will not change the dose or schedule unless my care team tells me to. There will be no refills if I run out early.     I may be asked to come to the clinic and complete a urine drug test or complete a pill count at any time. If I don t give a urine sample or participate in a pill count, the care team may stop my medicine.    I will only receive prescriptions from this clinic for chronic pain. If I am treated by another provider for acute pain issues, I will tell them that I am taking opioid pain medication for chronic pain and that I have a treatment agreement with this provider. I will inform my [facility] care team within one business day if I am given a prescription for any pain medication by another healthcare provider. My [facility] care team can contact other providers and pharmacists about my use of any medicines.    It is up to me to make sure that I don t run out of my medicines on weekends or  holidays. If my care team is willing to refill my opioid prescription without a visit, I must request refills only during office hours. Refills may take up to 3 business days to process. I will use one pharmacy to fill all my opioid and other controlled substance prescriptions. I will notify the clinic about any changes to my insurance or medication availability.    I am responsible for my prescriptions. If the medicine/prescription is lost, stolen or destroyed, it will not be replaced. I also agree not to share controlled substance medicines with anyone.    I am aware I should not use any illegal or recreational drugs. I agree not to drink alcohol unless my care team says I can.       If I enroll in the [place] [facility] program, I will tell my care team prior to my next refill.     I will tell my care team right away if I become pregnant, have a new medical problem treated outside of my regular clinic, or have a change in my medications.    I understand that this medicine can affect my thinking, judgment and reaction time. Alcohol and drugs affect the brain and body, which can affect the safety of my driving. Being under the influence of alcohol or drugs can affect my decision-making, behaviors, personal safety, and the safety of others. Driving while impaired (DWI) can occur if a person is driving, operating, or in physical control of a car, motorcycle, boat, snowmobile, ATV, motorbike, off-road vehicle, or any other motor vehicle ([place] Statute 169A.20). I understand the risk if I choose to drive or operate any vehicle or machinery.    I understand that if I do not follow any of the conditions above, my prescriptions or treatment may be stopped or changed.          Opioids  What You Need to Know    What are opioids?   Opioids are pain medicines that must be prescribed by a doctor. They are also known as narcotics.     Examples are:   morphine (MS Contin, Farideh)  oxycodone (Oxycontin)  oxycodone and  acetaminophen (Percocet)  hydrocodone and acetaminophen (Vicodin, Norco)   fentanyl patch (Duragesic)   hydromorphone (Dilaudid)   methadone  codeine (Tylenol #3)     What do opioids do well?   Opioids are best for severe short-term pain such as after a surgery or injury. They may work well for cancer pain. They may help some people with long-lasting (chronic) pain.     What do opioids NOT do well?   Opioids never get rid of pain entirely, and they don t work well for most patients with chronic pain. Opioids don t reduce swelling, one of the causes of pain.                                    Other ways to manage chronic pain and improve function include:     Treat the health problem that may be causing pain  Anti-inflammation medicines, which reduce swelling and tenderness, such as ibuprofen (Advil, Motrin) or naproxen (Aleve)  Acetaminophen (Tylenol)  Antidepressants and anti-seizure medicines, especially for nerve pain  Topical treatments such as patches or creams  Injections or nerve blocks  Chiropractic or osteopathic treatment  Acupuncture, massage, deep breathing, meditation, visual imagery, aromatherapy  Use heat or ice at the pain site  Physical therapy   Exercise  Stop smoking  Take part in therapy       Risks and side effects     Talk to your doctor before you start or decide to keep taking opioids. Possible side effects include:    Lowering your breathing rate enough to cause death  Overdose, including death, especially if taking higher than prescribed doses  Worse depression symptoms; less pleasure in things you usually enjoy  Feeling tired or sluggish  Slower thoughts or cloudy thinking  Being more sensitive to pain over time; pain is harder to control  Trouble sleeping or restless sleep  Changes in hormone levels (for example, less testosterone)  Changes in sex drive or ability to have sex  Constipation  Unsafe driving  Itching and sweating  Dizziness  Nausea, throwing up and dry mouth    What else  should I know about opioids?    Opioids may lead to dependence, tolerance, or addiction.    Dependence means that if you stop or reduce the medicine too quickly, you will have withdrawal symptoms. These include loose poop (diarrhea), jitters, flu-like symptoms, nervousness and tremors. Dependence is not the same as addiction.                     Tolerance means needing higher doses over time to get the same effect. This may increase the chance of serious side effects.    Addiction is when people improperly use a substance that harms their body, their mind or their relations with others. Use of opiates can cause a relapse of addiction if you have a history of drug or alcohol abuse.    People who have used opioids for a long time may have a lower quality of life, worse depression, higher levels of pain and more visits to doctors.    You can overdose on opioids. Take these steps to lower your risk of overdose:    Recognize the signs:  Signs of overdose include decrease or loss of consciousness (blackout), slowed breathing, trouble waking up and blue lips. If someone is worried about overdose, they should call 911.    Talk to your doctor about Narcan (naloxone).   If you are at risk for overdose, you may be given a prescription for Narcan. This medicine very quickly reverses the effects of opioids.   If you overdose, a friend or family member can give you Narcan while waiting for the ambulance. They need to know the signs of overdose and how to give Narcan.     Don't use alcohol or street drugs.   Taking them with opioids can cause death.    Do not take any of these medicines unless your doctor says it s OK. Taking these with opioids can cause death:  Benzodiazepines, such as lorazepam (Ativan), alprazolam (Xanax) or diazepam (Valium)  Muscle relaxers, such as cyclobenzaprine (Flexeril)  Sleeping pills like zolpidem (Ambien)   Other opioids      How to keep you and other people safe while taking opioids:    Never share  your opioids with others.  Opioid medicines are regulated by the Drug Enforcement Agency ([employer]). Selling or sharing medications is a criminal act.    2. Be sure to store opioids in a secure place, locked up if possible. Young children can easily swallow them and overdose.    3. When you are traveling with your medicines, keep them in the original bottles. If you use a pill box, be sure you also carry a copy of your medicine list from your clinic or pharmacy.    4. Safe disposal of opioids    Most pharmacies have places to get rid of medicine, called disposal kiosks. Medicine disposal options are also available in every [facility]. Search your On license of [facility] and  medication disposal  to find more options. You can find more details at:  [email or web address]://[email or web address]-green/[email or web address]-[email or web address]-medications     I agree that my provider, clinic care team, and pharmacy may work with any city, state or federal law enforcement agency that investigates the misuse, sale, or other diversion of my controlled medicine. I will allow my provider to discuss my care with, or share a copy of, this agreement with any other treating provider, pharmacy or emergency room where I receive care.    I have read this agreement and have asked questions about anything I did not understand.    _______________________________________________________  Patient Signature - Michaela Dorman _____________________                   Date     _______________________________________________________  Provider Signature - Phani Albright PA-C   _____________________                   Date     _______________________________________________________  Witness Signature (required if provider not present while patient signing)   _____________________                   Date

## 2024-04-25 NOTE — TELEPHONE ENCOUNTER
Dr. Aguirre ordered Fasting Lipid/ALT labs to be redrawn after patient increased her atorvastatin to 80mg and adding zetia back in mid March. Patient should still get fasting labs done to see where her cholesterol is at now. Please let patient know she should still have fasting labs done as scheduled on 4/29/24.           OV note with Dr. Aguirre on 4/15/24:  ASSESSMENT:     Ms. Lissa Dorman Is a 73-year-old woman with severe coronary artery calcification identified qualitatively on a CT scan of the chest abdomen and pelvis applying significant coronary artery disease.  She also has known vascular disease based on carotid endarterectomy in 2020.  Her cardiac risk factors include hypertension and dyslipidemia.  Her atorvastatin 40 mg daily is not lowering her cholesterol sufficiently and I agree with increasing her atorvastatin to 80 mg daily and adding Zetia which was done in mid March.  She is now having some shortness of breath with exertion and has had at least 1 episode of exertional chest discomfort.  I think would be reasonable to do a stress echocardiogram to evaluate for significant coronary artery disease.  I will recheck her lipid panel again at that time.     If her LDL remains elevated, we may need to consider a PCSK9 inhibitor.     I will plan to see her back again sooner if her stress test is abnormal, otherwise in 1 year.     Miguelito Aguirre MD

## 2024-04-29 ENCOUNTER — MYC MEDICAL ADVICE (OUTPATIENT)
Dept: CARDIOLOGY | Facility: CLINIC | Age: 74
End: 2024-04-29

## 2024-04-29 ENCOUNTER — LAB (OUTPATIENT)
Dept: LAB | Facility: CLINIC | Age: 74
End: 2024-04-29
Payer: COMMERCIAL

## 2024-04-29 DIAGNOSIS — E78.5 HYPERLIPIDEMIA LDL GOAL <130: ICD-10-CM

## 2024-04-29 LAB
ALT SERPL W P-5'-P-CCNC: 36 U/L (ref 0–50)
CHOLEST SERPL-MCNC: 194 MG/DL
FASTING STATUS PATIENT QL REPORTED: YES
HDLC SERPL-MCNC: 88 MG/DL
LDLC SERPL CALC-MCNC: 85 MG/DL
NONHDLC SERPL-MCNC: 106 MG/DL
TRIGL SERPL-MCNC: 106 MG/DL

## 2024-04-29 PROCEDURE — 36415 COLL VENOUS BLD VENIPUNCTURE: CPT

## 2024-04-29 PROCEDURE — 80061 LIPID PANEL: CPT

## 2024-04-29 PROCEDURE — 84460 ALANINE AMINO (ALT) (SGPT): CPT

## 2024-05-01 LAB
1OH-MIDAZOLAM UR QL SCN: PRESENT
GABAPENTIN UR QL CFM: PRESENT
LORAZEPAM UR QL CFM: PRESENT
NORFENTANYL UR CFM-MCNC: 7 NG/ML
NORFENTANYL/CREAT UR: 5 NG/MG {CREAT}
OXYCODONE UR CFM-MCNC: 883 NG/ML
OXYCODONE/CREAT UR: 577 NG/MG {CREAT}
OXYMORPHONE UR CFM-MCNC: 167 NG/ML
OXYMORPHONE/CREAT UR: 109 NG/MG {CREAT}

## 2024-05-04 NOTE — TELEPHONE ENCOUNTER
Left Voicemail (1st Attempt) and Sent Mychart (1st Attempt) for the patient to call back and schedule the following:    Appointment type: Return Cardiology  Provider: Rafa  Return date: First avail  Specialty phone number: [phone] opt 1  Additional appointment(s) needed: NA  Additonal Notes: No device   Shana Pickard on 3/22/2024 at 3:12 PM    
unknown

## 2024-05-05 ENCOUNTER — TELEPHONE (OUTPATIENT)
Dept: FAMILY MEDICINE | Facility: CLINIC | Age: 74
End: 2024-05-05
Payer: COMMERCIAL

## 2024-05-05 ENCOUNTER — NURSE TRIAGE (OUTPATIENT)
Dept: NURSING | Facility: CLINIC | Age: 74
End: 2024-05-05
Payer: COMMERCIAL

## 2024-05-05 NOTE — TELEPHONE ENCOUNTER
Reason for Call:  Appointment Request    Patient requesting this type of appt:  office visit    Requested provider:  No physician, Prefers [facility].     Reason patient unable to be scheduled: Not within requested timeframe    When does patient want to be seen/preferred time: within a few days 1-3 days. Prefers [facility].     Comments:  Patient states having flu like symptoms. Spoke with triage nurse, and nurse suggested her to be seen within the next few days.     Could we send this information to you in [employer] or would you prefer to receive a phone call?:   [phone]    Call taken on 5/5/2024 at 8:33 AM by SHAKIRA MILLAN

## 2024-05-05 NOTE — TELEPHONE ENCOUNTER
Patient has had the flu since Monday night with a painful stomach and her head has been plugged up. Symptoms have been this way since then and patient has been unable to eat. A fever one night. No vomiting. Patient had heart stints put in 2 weeks ago. Patient is drinking a lot of water and is staying hydrated. Patient is feeling very weak. Patient is able to get up around the house independently but resting a lot. Abdominal pain above the belly button that comes and goes. Patient has taken compazine which seems to help. After her stints patient was changed from prilosec to protonix and feels that may be playing a part as well. Patient treating headache with tylenol which helps but it returns.   Protocol recommends see PCP within 3 days  Care advice given. Patient to call back with worsening symptoms.   Transferred to scheduling.   Ramya Santos RN   05/05/24 8:31 AM  [facility] Nurse Advisor    Reason for Disposition   [1] Sinus congestion (pressure, fullness) AND [2] present > 10 days    Additional Information   Negative: SEVERE difficulty breathing (e.g., struggling for each breath, speaks in single words)   Negative: Sounds like a life-threatening emergency to the triager   Negative: [1] Sinus infection AND [2] taking an antibiotic AND [3] symptoms continue   Negative: [1] Difficulty breathing AND [2] not from stuffy nose (e.g., not relieved by cleaning out the nose)   Negative: [1] SEVERE headache AND [2] fever   Negative: [1] Redness or swelling on the cheek, forehead or around the eye AND [2] fever   Negative: Fever > 104 F (40 C)   Negative: Patient sounds very sick or weak to the triager   Negative: [1] SEVERE pain AND [2] not improved 2 hours after pain medicine   Negative: [1] Redness or swelling on the cheek, forehead or around the eye AND [2] no fever   Negative: [1] Fever > 101 F (38.3 C) AND [2] age > 60 years   Negative: [1] Fever > 100.0 F (37.8 C) AND [2] bedridden (e.g., CVA, chronic illness,  recovering from surgery)   Negative: [1] Fever > 100.0 F (37.8 C) AND [2] diabetes mellitus or weak immune system (e.g., HIV positive, cancer chemo, splenectomy, organ transplant, chronic steroids)   Negative: Fever present > 3 days (72 hours)   Negative: [1] Fever returns after gone for over 24 hours AND [2] symptoms worse or not improved   Negative: [1] Sinus pain (not just congestion) AND [2] fever   Negative: Earache    Protocols used: Sinus Pain or Congestion-A-AH

## 2024-05-06 ENCOUNTER — HOSPITAL ENCOUNTER (EMERGENCY)
Facility: CLINIC | Age: 74
Discharge: HOME | End: 2024-05-06
Attending: STUDENT IN AN ORGANIZED HEALTH CARE EDUCATION/TRAINING PROGRAM | Admitting: STUDENT IN AN ORGANIZED HEALTH CARE EDUCATION/TRAINING PROGRAM
Payer: MEDICARE

## 2024-05-06 ENCOUNTER — APPOINTMENT (OUTPATIENT)
Dept: GENERAL RADIOLOGY | Facility: CLINIC | Age: 74
End: 2024-05-06
Attending: STUDENT IN AN ORGANIZED HEALTH CARE EDUCATION/TRAINING PROGRAM
Payer: MEDICARE

## 2024-05-06 VITALS
TEMPERATURE: 98.2 F | DIASTOLIC BLOOD PRESSURE: 81 MMHG | BODY MASS INDEX: 23.63 KG/M2 | RESPIRATION RATE: 14 BRPM | WEIGHT: 142 LBS | SYSTOLIC BLOOD PRESSURE: 146 MMHG | OXYGEN SATURATION: 93 % | HEART RATE: 73 BPM

## 2024-05-06 DIAGNOSIS — N39.0 ACUTE UTI: ICD-10-CM

## 2024-05-06 LAB
ALBUMIN UR-MCNC: NEGATIVE MG/DL
ANION GAP SERPL CALCULATED.3IONS-SCNC: 14 MMOL/L (ref 7–15)
APPEARANCE UR: CLEAR
BACTERIA #/AREA URNS HPF: ABNORMAL /HPF
BASOPHILS # BLD AUTO: 0 10E3/UL (ref 0–0.2)
BASOPHILS NFR BLD AUTO: 0 %
BILIRUB UR QL STRIP: NEGATIVE
BUN SERPL-MCNC: 21 MG/DL (ref 8–23)
CALCIUM SERPL-MCNC: 9.9 MG/DL (ref 8.8–10.2)
CHLORIDE SERPL-SCNC: 98 MMOL/L (ref 98–107)
COLOR UR AUTO: YELLOW
CREAT SERPL-MCNC: 1.11 MG/DL (ref 0.51–0.95)
DEPRECATED HCO3 PLAS-SCNC: 23 MMOL/L (ref 22–29)
EGFRCR SERPLBLD CKD-EPI 2021: 52 ML/MIN/1.73M2
EOSINOPHIL # BLD AUTO: 0.1 10E3/UL (ref 0–0.7)
EOSINOPHIL NFR BLD AUTO: 1 %
ERYTHROCYTE [DISTWIDTH] IN BLOOD BY AUTOMATED COUNT: 13.5 % (ref 10–15)
GLUCOSE SERPL-MCNC: 112 MG/DL (ref 70–99)
GLUCOSE UR STRIP-MCNC: NEGATIVE MG/DL
HCT VFR BLD AUTO: 32.9 % (ref 35–47)
HGB BLD-MCNC: 10.6 G/DL (ref 11.7–15.7)
HGB UR QL STRIP: ABNORMAL
HOLD SPECIMEN: NORMAL
IMM GRANULOCYTES # BLD: 0 10E3/UL
IMM GRANULOCYTES NFR BLD: 1 %
KETONES UR STRIP-MCNC: NEGATIVE MG/DL
LEUKOCYTE ESTERASE UR QL STRIP: ABNORMAL
LYMPHOCYTES # BLD AUTO: 1.8 10E3/UL (ref 0.8–5.3)
LYMPHOCYTES NFR BLD AUTO: 20 %
MCH RBC QN AUTO: 32.8 PG (ref 26.5–33)
MCHC RBC AUTO-ENTMCNC: 32.2 G/DL (ref 31.5–36.5)
MCV RBC AUTO: 102 FL (ref 78–100)
MONOCYTES # BLD AUTO: 1.5 10E3/UL (ref 0–1.3)
MONOCYTES NFR BLD AUTO: 17 %
NEUTROPHILS # BLD AUTO: 5.4 10E3/UL (ref 1.6–8.3)
NEUTROPHILS NFR BLD AUTO: 61 %
NITRATE UR QL: POSITIVE
NRBC # BLD AUTO: 0 10E3/UL
NRBC BLD AUTO-RTO: 0 /100
PH UR STRIP: 6 [PH] (ref 5–7)
PLATELET # BLD AUTO: 264 10E3/UL (ref 150–450)
POTASSIUM SERPL-SCNC: 3.8 MMOL/L (ref 3.4–5.3)
RBC # BLD AUTO: 3.23 10E6/UL (ref 3.8–5.2)
RBC URINE: 1 /HPF
SODIUM SERPL-SCNC: 135 MMOL/L (ref 135–145)
SP GR UR STRIP: 1.01 (ref 1–1.03)
TROPONIN T SERPL HS-MCNC: 26 NG/L
TROPONIN T SERPL HS-MCNC: 29 NG/L
UROBILINOGEN UR STRIP-MCNC: NORMAL MG/DL
WBC # BLD AUTO: 8.8 10E3/UL (ref 4–11)
WBC URINE: 11 /HPF

## 2024-05-06 PROCEDURE — 87040 BLOOD CULTURE FOR BACTERIA: CPT | Mod: XS | Performed by: STUDENT IN AN ORGANIZED HEALTH CARE EDUCATION/TRAINING PROGRAM

## 2024-05-06 PROCEDURE — 96361 HYDRATE IV INFUSION ADD-ON: CPT | Performed by: STUDENT IN AN ORGANIZED HEALTH CARE EDUCATION/TRAINING PROGRAM

## 2024-05-06 PROCEDURE — 250N000011 HC RX IP 250 OP 636: Performed by: STUDENT IN AN ORGANIZED HEALTH CARE EDUCATION/TRAINING PROGRAM

## 2024-05-06 PROCEDURE — 96375 TX/PRO/DX INJ NEW DRUG ADDON: CPT | Performed by: STUDENT IN AN ORGANIZED HEALTH CARE EDUCATION/TRAINING PROGRAM

## 2024-05-06 PROCEDURE — 71046 X-RAY EXAM CHEST 2 VIEWS: CPT

## 2024-05-06 PROCEDURE — 93005 ELECTROCARDIOGRAM TRACING: CPT | Performed by: STUDENT IN AN ORGANIZED HEALTH CARE EDUCATION/TRAINING PROGRAM

## 2024-05-06 PROCEDURE — 87186 SC STD MICRODIL/AGAR DIL: CPT | Performed by: STUDENT IN AN ORGANIZED HEALTH CARE EDUCATION/TRAINING PROGRAM

## 2024-05-06 PROCEDURE — 99285 EMERGENCY DEPT VISIT HI MDM: CPT | Performed by: STUDENT IN AN ORGANIZED HEALTH CARE EDUCATION/TRAINING PROGRAM

## 2024-05-06 PROCEDURE — 85041 AUTOMATED RBC COUNT: CPT | Performed by: STUDENT IN AN ORGANIZED HEALTH CARE EDUCATION/TRAINING PROGRAM

## 2024-05-06 PROCEDURE — 80048 BASIC METABOLIC PNL TOTAL CA: CPT | Performed by: STUDENT IN AN ORGANIZED HEALTH CARE EDUCATION/TRAINING PROGRAM

## 2024-05-06 PROCEDURE — 36415 COLL VENOUS BLD VENIPUNCTURE: CPT | Performed by: STUDENT IN AN ORGANIZED HEALTH CARE EDUCATION/TRAINING PROGRAM

## 2024-05-06 PROCEDURE — 96376 TX/PRO/DX INJ SAME DRUG ADON: CPT | Performed by: STUDENT IN AN ORGANIZED HEALTH CARE EDUCATION/TRAINING PROGRAM

## 2024-05-06 PROCEDURE — 87086 URINE CULTURE/COLONY COUNT: CPT | Performed by: STUDENT IN AN ORGANIZED HEALTH CARE EDUCATION/TRAINING PROGRAM

## 2024-05-06 PROCEDURE — 99285 EMERGENCY DEPT VISIT HI MDM: CPT | Mod: 25 | Performed by: STUDENT IN AN ORGANIZED HEALTH CARE EDUCATION/TRAINING PROGRAM

## 2024-05-06 PROCEDURE — 93010 ELECTROCARDIOGRAM REPORT: CPT | Performed by: STUDENT IN AN ORGANIZED HEALTH CARE EDUCATION/TRAINING PROGRAM

## 2024-05-06 PROCEDURE — 258N000003 HC RX IP 258 OP 636: Performed by: STUDENT IN AN ORGANIZED HEALTH CARE EDUCATION/TRAINING PROGRAM

## 2024-05-06 PROCEDURE — 81001 URINALYSIS AUTO W/SCOPE: CPT | Performed by: STUDENT IN AN ORGANIZED HEALTH CARE EDUCATION/TRAINING PROGRAM

## 2024-05-06 PROCEDURE — 84484 ASSAY OF TROPONIN QUANT: CPT | Performed by: STUDENT IN AN ORGANIZED HEALTH CARE EDUCATION/TRAINING PROGRAM

## 2024-05-06 PROCEDURE — 96365 THER/PROPH/DIAG IV INF INIT: CPT | Performed by: STUDENT IN AN ORGANIZED HEALTH CARE EDUCATION/TRAINING PROGRAM

## 2024-05-06 RX ORDER — HEPARIN SODIUM,PORCINE 10 UNIT/ML
3-6 VIAL (ML) INTRAVENOUS EVERY 24 HOURS
Status: DISCONTINUED | OUTPATIENT
Start: 2024-05-06 | End: 2024-05-06 | Stop reason: HOSPADM

## 2024-05-06 RX ORDER — NITROFURANTOIN 25; 75 MG/1; MG/1
100 CAPSULE ORAL 2 TIMES DAILY
Qty: 14 CAPSULE | Refills: 0 | Status: SHIPPED | OUTPATIENT
Start: 2024-05-06 | End: 2024-06-12

## 2024-05-06 RX ORDER — MORPHINE SULFATE 4 MG/ML
4 INJECTION, SOLUTION INTRAMUSCULAR; INTRAVENOUS
Status: DISCONTINUED | OUTPATIENT
Start: 2024-05-06 | End: 2024-05-06 | Stop reason: HOSPADM

## 2024-05-06 RX ORDER — HEPARIN SODIUM,PORCINE 10 UNIT/ML
3-6 VIAL (ML) INTRAVENOUS
Status: DISCONTINUED | OUTPATIENT
Start: 2024-05-06 | End: 2024-05-06 | Stop reason: HOSPADM

## 2024-05-06 RX ORDER — ONDANSETRON 2 MG/ML
4 INJECTION INTRAMUSCULAR; INTRAVENOUS EVERY 30 MIN PRN
Status: DISCONTINUED | OUTPATIENT
Start: 2024-05-06 | End: 2024-05-06 | Stop reason: HOSPADM

## 2024-05-06 RX ORDER — SUCRALFATE 1 G/1
1 TABLET ORAL 2 TIMES DAILY PRN
COMMUNITY
Start: 2024-03-04 | End: 2024-08-18

## 2024-05-06 RX ORDER — CEFTRIAXONE 2 G/1
2 INJECTION, POWDER, FOR SOLUTION INTRAMUSCULAR; INTRAVENOUS ONCE
Status: COMPLETED | OUTPATIENT
Start: 2024-05-06 | End: 2024-05-06

## 2024-05-06 RX ORDER — HEPARIN SODIUM (PORCINE) LOCK FLUSH IV SOLN 100 UNIT/ML 100 UNIT/ML
5 SOLUTION INTRAVENOUS
Status: DISCONTINUED | OUTPATIENT
Start: 2024-05-06 | End: 2024-05-06 | Stop reason: HOSPADM

## 2024-05-06 RX ADMIN — ONDANSETRON 4 MG: 2 INJECTION INTRAMUSCULAR; INTRAVENOUS at 14:18

## 2024-05-06 RX ADMIN — HEPARIN 5 ML: 100 SYRINGE at 14:26

## 2024-05-06 RX ADMIN — ONDANSETRON 4 MG: 2 INJECTION INTRAMUSCULAR; INTRAVENOUS at 11:31

## 2024-05-06 RX ADMIN — CEFTRIAXONE SODIUM 2 G: 2 INJECTION, POWDER, FOR SOLUTION INTRAMUSCULAR; INTRAVENOUS at 13:42

## 2024-05-06 RX ADMIN — MORPHINE SULFATE 4 MG: 4 INJECTION, SOLUTION INTRAMUSCULAR; INTRAVENOUS at 14:20

## 2024-05-06 RX ADMIN — MORPHINE SULFATE 4 MG: 4 INJECTION, SOLUTION INTRAMUSCULAR; INTRAVENOUS at 11:34

## 2024-05-06 RX ADMIN — SODIUM CHLORIDE 1000 ML: 9 INJECTION, SOLUTION INTRAVENOUS at 11:17

## 2024-05-06 ASSESSMENT — COLUMBIA-SUICIDE SEVERITY RATING SCALE - C-SSRS
1. IN THE PAST MONTH, HAVE YOU WISHED YOU WERE DEAD OR WISHED YOU COULD GO TO SLEEP AND NOT WAKE UP?: NO
6. HAVE YOU EVER DONE ANYTHING, STARTED TO DO ANYTHING, OR PREPARED TO DO ANYTHING TO END YOUR LIFE?: NO
2. HAVE YOU ACTUALLY HAD ANY THOUGHTS OF KILLING YOURSELF IN THE PAST MONTH?: NO

## 2024-05-06 ASSESSMENT — ACTIVITIES OF DAILY LIVING (ADL)
ADLS_ACUITY_SCORE: 37

## 2024-05-06 ASSESSMENT — ENCOUNTER SYMPTOMS: WEAKNESS: 1

## 2024-05-06 NOTE — TELEPHONE ENCOUNTER
Left message for patient to call  back.  Please relay message below and help her schedule appointment.

## 2024-05-06 NOTE — ED PROVIDER NOTES
History     Chief Complaint   Patient presents with    Chest Pain     HPI  Michaela Dorman is a 73 year old female who presents with chest discomfort and generalized weakness for the past week without significant appetite.  She has an extensive cardiac history with stents placed approximate 2 weeks ago as well as chemo induced neuropathy, chronic fatigue, ileostomy secondary to history of bladder cancer and numerous autoimmune diseases.  She is not any nausea or vomiting but feels just weak in general.  Chest pain is on the right-hand side and associated with some deep inspiration intermittently.  She was concerned that she may have worsening of her stents but does not have any significant nausea or sweats or radiating pain.    Allergies:  Allergies   Allergen Reactions    Oxybutynin      Patient reports symptoms of nausea and mind fogginess       Problem List:    Patient Active Problem List    Diagnosis Date Noted    Carotid artery disease without cerebral infarction (H24) 04/25/2024     Priority: Medium    Imbalance 02/08/2024     Priority: Medium    Chemotherapy-induced neuropathy (H24) 01/03/2024     Priority: Medium    Unspecified severe protein-calorie malnutrition (H24) 01/03/2024     Priority: Medium    Chronic fatigue syndrome 07/07/2023     Priority: Medium    Vision changes 07/07/2023     Priority: Medium    Balance problems 07/07/2023     Priority: Medium    Skin sensation disturbance 07/07/2023     Priority: Medium    Ileostomy status (H) 07/07/2023     Priority: Medium    Acquired hypothyroidism 07/07/2023     Priority: Medium    Hypothyroidism due to medication 07/07/2023     Priority: Medium    Pulmonary nodules 07/07/2023     Priority: Medium    F11.2 - Continuous opioid dependence (H) 07/07/2023     Priority: Medium    Status post ileal conduit (H) 09/12/2022     Priority: Medium    Status of artificial opening of urinary tract (H) 08/19/2022     Priority: Medium    Rheumatoid arthritis of  multiple sites with negative rheumatoid factor (H) 02/25/2022     Priority: Medium    Parathyroid abnormality (H24) 02/25/2022     Priority: Medium    Hypomagnesemia 11/18/2021     Priority: Medium    Need for prophylactic vaccination and inoculation against influenza 11/18/2021     Priority: Medium    Constipation, unspecified constipation type 09/28/2021     Priority: Medium    Imaging abnormalities 09/28/2021     Priority: Medium    Anemia in neoplastic disease 05/21/2021     Priority: Medium    Chemotherapy-induced neutropenia (H24) 05/19/2021     Priority: Medium    Thrombocytopenia (H24) 05/19/2021     Priority: Medium    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) 04/07/2021     Priority: Medium     Check 11.21 for f/u Marty  Added automatically from request for surgery [id number]      Malignant neoplasm of overlapping sites of bladder (H) 03/08/2021     Priority: Medium    Personal history of malignant neoplasm of bladder 03/04/2021     Priority: Medium     Added automatically from request for surgery [id number]      Benign paroxysmal positional vertigo, bilateral 12/24/2020     Priority: Medium    Personal history of malignant neoplasm of breast 10/01/2020     Priority: Medium    Acute cystitis with hematuria 10/01/2020     Priority: Medium    Symptomatic stenosis of left carotid artery 09/22/2020     Priority: Medium     Added automatically from request for surgery [id number]      POSSIBLE Right internal carotid artery aneurysm 09/08/2020     Priority: Medium    Carotid stenosis, bilateral L80%, R40% 09/02/2020     Priority: Medium    Carotid artery plaque, bilateral 09/02/2020     Priority: Medium    Anemia 07/16/2020     Priority: Medium    S/P lumbar fusion 02/04/2020     Priority: Medium    Bilateral impacted cerumen 01/13/2020     Priority: Medium    Stage 3a chronic kidney disease (H) 09/09/2019     Priority: Medium    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      Central stenosis of spinal canal 12/01/2017     Priority: Medium     lumbar        DDD (degenerative disc disease), lumbar 12/01/2017     Priority: Medium    Chronic pain syndrome 11/30/2017     Priority: Medium     substancePatient is followed by Phani Jason PA-C for ongoing prescription of pain medication.  All refills should only be approved by this provider, or covering partner.    Medication(s): Oxycodone IR 5mg.   Maximum quantity per month: 20  Clinic visit frequency required: Q 3 months     Controlled substance agreement:  Encounter-Level CSA:       There are no encounter-level csa.          Pain Clinic evaluation in the past: Yes       Date/Location:      DIRE Total Score(s):  No flowsheet data found.    Last [facility] website verification:  none   [email or web address]            Lumbar radiculopathy 11/30/2017     Priority: Medium    S/P reverse total shoulder arthroplasty, right 06/05/2017     Priority: Medium    Prophylactic antibiotic for dental procedure indicated due to prior joint replacement 06/05/2017     Priority: Medium    Hyperlipidemia LDL goal <100 05/30/2017     Priority: Medium    Anxiety 03/29/2017     Priority: Medium    S/P bilateral mastectomy 02/08/2017     Priority: Medium    Encounter for antineoplastic chemotherapy 10/07/2016     Priority: Medium    Malignant neoplasm of upper-outer quadrant of left breast in female, estrogen receptor negative (H) 10/06/2016     Priority: Medium    Arthritis of shoulder region, right 11/17/2015     Priority: Medium    Adjustment disorder with depressed mood 11/11/2015     Priority: Medium    Baker's cyst of knee 02/09/2015     Priority: Medium    Patella-femoral syndrome  02/09/2015     Priority: Medium    Osteoarthrosis, hip 02/05/2013     Priority: Medium    Tinnitus of right ear 02/05/2013     Priority: Medium    Hypertension goal BP (blood pressure) < 140/90 10/04/2011     Priority: Medium    Health Care Home 09/29/2011     Priority: Medium     x  DX V65.8 REPLACED WITH 50014 HEALTH CARE HOME (04/08/2013)      Advanced directives, counseling/discussion 08/22/2011     Priority: Medium     Advance Directive Problem List Overview:   Name Relationship Phone    Primary Health Care Agent            Alternative Health Care Agent          Patient states has Advance Directive and will bring in a copy to clinic. 8/22/2011         Trochanteric bursitis 01/06/2009     Priority: Medium    Family history of stroke (cerebrovascular) 03/07/2008     Priority: Medium    Enthesopathy of hip region 01/30/2006     Priority: Medium        Past Medical History:    Past Medical History:   Diagnosis Date    Acute kidney injury (H24)     Carotid stenosis, bilateral L80%, R40% 09/02/2020    Central stenosis of spinal canal 12/01/2017    DDD (degenerative disc disease), lumbar 12/01/2017    Depressive disorder, not elsewhere classified     Esophageal reflux     ETOH abuse     Foraminal stenosis of lumbar region 12/01/2017    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    Subdural hematoma (H)     Thyroid disease     Urothelial carcinoma (H)        Past Surgical History:    Past Surgical History:   Procedure Laterality Date    ARTHROPLASTY SHOULDER Right 11/17/2015    ARTHROSCOPY KNEE  6/7/2012    Procedure:ARTHROSCOPY KNEE; right knee arthroscopy with partial lateral menisectomy; Surgeon:DAMIÁN MCALLISTER; Location:[facility]    BIOPSY VAGINAL N/A 10/27/2021    Procedure: DISTAL URETHRECTOMY;  Surgeon: Margaret  Leonardo Preston MD;  Location: [facility] OR    COLONOSCOPY N/A 12/22/2017    Procedure: COLONOSCOPY;  colonoscopy;  Surgeon: Chuck Chand MD;  Location: [facility] GI    CV CORONARY ANGIOGRAM N/A 4/23/2024    Procedure: Coronary Angiogram;  Surgeon: Leonardo Marinelli MD;  Location:  HE[facility] CARDIAC CATH LAB    CV INTRAVASULAR ULTRASOUND N/A 4/23/2024    Procedure: Intravascular Ultrasound;  Surgeon: Leonardo Marinelli MD;  Location:  HEART CARDIAC CATH LAB    CV LEFT HEART CATH N/A 4/23/2024    Procedure: Left Heart Catheterization;  Surgeon: Leonardo Marinelli MD;  Location:  HEART CARD[facility]AC CATH LAB    CV PCI STENT DRUG ELUTING N/A 4/23/2024    Procedure: Percutaneous Coronary Intervention Stent;  Surgeon: Leonardo Marinelli MD;  Location:  HEART CARDIAC CATH LAB    CYSTECTOMY BLADDER RADICAL, ILEAL DIVERSION, COMBINED N/A 6/1/2021    Procedure: RADICAL CYSTECTOMY  WITH ILEAL CONDUIT CREATION,BILATERAL PELVIC LYMPHADENECTOMY;  Surgeon: Leonardo Robles MD;  Location: [facility] OR    CYSTOSCOPY, RETROGRADES, COMBINED Bilateral 3/18/2021    Procedure: CYSTOSCOPY, WITH RETROGRADE PYELOGRAM;  Surgeon: Girish Jack MD;  Location: [facility] OR    CYSTOSCOPY, TRANSURETHRAL RESECTION (TUR) TUMOR BLADDER, COMBINED N/A 3/18/2021    Procedure: CYSTOSCOPY, WITH TRANSURETHRAL RESECTION BLADDER TUMOR;  Surgeon: Girish Jack MD;  Location: [facility] OR    ENDARTERECTOMY CAROTID Left 10/8/2020    Procedure: LEFT CAROTID ENDARTERECTOMY WITH EEG;  Surgeon: Lacho Jasmine MD;  Location:  OR    ESOPHAGOSCOPY, GASTROSCOPY, DUODENOSCOPY (EGD), COMBINED N/A 6/20/2022    Procedure: ESOPHAGOGASTRODUODENOSCOPY, WITH BIOPSY;  Surgeon: Ramses Arenas MD;  Location:  GI    EXCISE MASS AXILLA Left 9/16/2016    Procedure: EXCISE MASS AXILLA;  Surgeon: Keyon Blanco MD;  Location:  OR    HC REMV CATARACT EXTRACAP,INSERT LENS, W/O ECP  6/25/2009    Right eye    INJECT EPIDURAL LUMBAR N/A 4/11/2019    Procedure:  interlaminar epidual steroid injection lumbar 4-5;  Surgeon: Oskar Salvador MD;  [facility]    INJECT EPIDURAL LUMBAR Bilateral 9/12/2019    Procedure: lumbar 4-5 epidural interlaminar steroid injection;  Surgeon: Oskar Salvador MD;  [facility]: [facility]    INSERT PORT VASCULAR ACCESS Right 10/7/2016    Procedure: INSERT PORT VASCULAR ACCESS;  Surgeon: Keyon Blanco MD;  Location: [facility]    IR CHEST PORT PLACEMENT > 5 YRS OF AGE  4/16/2021    MASTECTOMY SIMPLE BILATERAL Bilateral 2/8/2017    Procedure: MASTECTOMY SIMPLE BILATERAL;  Surgeon: Keyon Blanco MD;  Location: [facility]    OPEN REDUCTION INTERNAL FIXATION FOOT Right 3/20/2015    Procedure: OPEN REDUCTION INTERNAL FIXATION FOOT;  Surgeon: Javi Herrmann DPM;  Location: [facility]    OPTICAL TRACKING SYSTEM FUSION SPINE POSTERIOR LUMBAR TWO LEVELS N/A 2/4/2020    Procedure: LUMBAR 2-SACRAL1 TRANSFORMINAL INTERBODY FUSION;  Surgeon: Lenny Gomes MD;  Location: [facility] OR    REMOVE PORT VASCULAR ACCESS Right 2/14/2018    Procedure: REMOVE PORT VASCULAR ACCESS;  right intra jugular port removal;  Surgeon: Keyon Blanco MD;  Location: [facility]    SHOULDER SURGERY Right 11/2015    ZZC LIGATE FALLOPIAN TUBE      ZZC NONSPECIFIC PROCEDURE  1956    ankle fracture surgery       Family History:    Family History   Problem Relation Age of Onset    Hypertension Mother     Thyroid Disease Mother     Cerebrovascular Disease Mother     Hypertension Father     Cerebrovascular Disease Father     Cancer Father         skin    Thyroid Disease Sister     Diabetes Brother         type 2    Depression Sister     Breast Cancer Sister         age 47    Cancer Sister         skin    Cancer Brother         inside nose       Social History:  Marital Status:   [2]  Social History     Tobacco Use    Smoking status: Former     Current packs/day: 0.00     Average packs/day: 3.0 packs/day for 10.0 years (30.0 ttl pk-yrs)     Types: Cigarettes     Start date: 12/1/1969      Quit date: 1979     Years since quittin.4     Passive exposure: Never    Smokeless tobacco: Never    Tobacco comments:     approx. 3 packs daily   Vaping Use    Vaping status: Never Used   Substance Use Topics    Alcohol use: Not Currently     Comment: Stopped drinking a few weeks ago (2023)    Drug use: No        Medications:    acetaminophen (TYLENOL) 500 MG tablet  aspirin (ASA) 81 MG chewable tablet  atorvastatin (LIPITOR) 40 MG tablet  carvedilol (COREG) 6.25 MG tablet  clopidogrel (PLAVIX) 75 MG tablet  Cyanocobalamin (B-12 PO)  escitalopram (LEXAPRO) 10 MG tablet  ezetimibe (ZETIA) 10 MG tablet  gabapentin (NEURONTIN) 300 MG capsule  levothyroxine (SYNTHROID/LEVOTHROID) 25 MCG tablet  LORazepam (ATIVAN) 0.5 MG tablet  meclizine (ANTIVERT) 25 MG tablet  nitroFURantoin macrocrystal-monohydrate (MACROBID) 100 MG capsule  nitroGLYcerin (NITROSTAT) 0.4 MG sublingual tablet  oxyCODONE (ROXICODONE) 5 MG tablet  pantoprazole (PROTONIX) 40 MG EC tablet  sucralfate (CARAFATE) 1 GM tablet          Review of Systems   Cardiovascular:  Positive for chest pain.   Neurological:  Positive for weakness.   All other systems reviewed and are negative.      Physical Exam   BP: (!) 135/93  Pulse: 108  Temp: 98.2  F (36.8  C)  Resp: 18  Weight: 64.4 kg (142 lb)  SpO2: 98 %      Physical Exam  Vitals and nursing note reviewed.   Constitutional:       General: She is not in acute distress.     Appearance: She is well-developed and normal weight. She is not ill-appearing, toxic-appearing or diaphoretic.   HENT:      Head: Normocephalic and atraumatic.   Cardiovascular:      Rate and Rhythm: Normal rate and regular rhythm.      Heart sounds: Normal heart sounds.   Pulmonary:      Effort: Pulmonary effort is normal.      Breath sounds: Normal breath sounds. No decreased breath sounds, wheezing or rhonchi.   Abdominal:      General: Bowel sounds are normal.      Palpations: Abdomen is soft.   Musculoskeletal:          General: Normal range of motion.   Skin:     General: Skin is warm and dry.      Capillary Refill: Capillary refill takes less than 2 seconds.   Neurological:      General: No focal deficit present.      Mental Status: She is alert and oriented to person, place, and time.   Psychiatric:         Mood and Affect: Mood normal.         ED Course        Procedures         EKG self interpreted at 9:49 AM.  Sinus rhythm at 91 bpm, KS interval 146 QTc of 396 and QRS 98.  No significant ST segment depressions elevations T wave inversions or bundle-branch blocks.  There was 1 ectopic beat ventricular nature.  Borderline EKG comparable to previous.       Results for orders placed or performed during the hospital encounter of 05/06/24 (from the past 24 hour(s))   CBC with Platelets & Differential    Narrative    The following orders were created for panel order CBC with Platelets & Differential.  Procedure                               Abnormality         Status                     ---------                               -----------         ------                     CBC with platelets and d...[[id number]]  Abnormal            Final result                 Please view results for these tests on the individual orders.   Basic metabolic panel   Result Value Ref Range    Sodium 135 135 - 145 mmol/L    Potassium 3.8 3.4 - 5.3 mmol/L    Chloride 98 98 - 107 mmol/L    Carbon Dioxide (CO2) 23 22 - 29 mmol/L    Anion Gap 14 7 - 15 mmol/L    Urea Nitrogen 21.0 8.0 - 23.0 mg/dL    Creatinine 1.11 (H) 0.51 - 0.95 mg/dL    GFR Estimate 52 (L) >60 mL/min/1.73m2    Calcium 9.9 8.8 - 10.2 mg/dL    Glucose 112 (H) 70 - 99 mg/dL   Troponin T, High Sensitivity   Result Value Ref Range    Troponin T, High Sensitivity 29 (H) <=14 ng/L   [place] Draw    Narrative    The following orders were created for panel order [place] Draw.  Procedure                               Abnormality         Status                     ---------                                -----------         ------                     Extra Heparinized Syringe[[id number]]                        Final result                 Please view results for these tests on the individual orders.   CBC with platelets and differential   Result Value Ref Range    WBC Count 8.8 4.0 - 11.0 10e3/uL    RBC Count 3.23 (L) 3.80 - 5.20 10e6/uL    Hemoglobin 10.6 (L) 11.7 - 15.7 g/dL    Hematocrit 32.9 (L) 35.0 - 47.0 %     (H) 78 - 100 fL    MCH 32.8 26.5 - 33.0 pg    MCHC 32.2 31.5 - 36.5 g/dL    RDW 13.5 10.0 - 15.0 %    Platelet Count 264 150 - 450 10e3/uL    % Neutrophils 61 %    % Lymphocytes 20 %    % Monocytes 17 %    % Eosinophils 1 %    % Basophils 0 %    % Immature Granulocytes 1 %    NRBCs per 100 WBC 0 <1 /100    Absolute Neutrophils 5.4 1.6 - 8.3 10e3/uL    Absolute Lymphocytes 1.8 0.8 - 5.3 10e3/uL    Absolute Monocytes 1.5 (H) 0.0 - 1.3 10e3/uL    Absolute Eosinophils 0.1 0.0 - 0.7 10e3/uL    Absolute Basophils 0.0 0.0 - 0.2 10e3/uL    Absolute Immature Granulocytes 0.0 <=0.4 10e3/uL    Absolute NRBCs 0.0 10e3/uL   Extra Heparinized Syringe   Result Value Ref Range    Hold Specimen hold    XR Chest 2 Views    Narrative    XR CHEST 2 VIEWS 5/6/2024 11:12 AM    HISTORY: chest pain    COMPARISON: 1/28/2024        Impression    IMPRESSION: Right IJ port catheter, tip at the SVC/right atrial  junction. No acute cardiopulmonary process. Right shoulder  arthroplasty. Instrumented fusion in the lumbar spine.    BALDEMAR CRUZ MD         SYSTEM ID:  [id number]   Troponin T, High Sensitivity   Result Value Ref Range    Troponin T, High Sensitivity 26 (H) <=14 ng/L   UA with Microscopic reflex to Culture    Specimen: Urostomy; Urine   Result Value Ref Range    Color Urine Yellow Colorless, Straw, Light Yellow, Yellow    Appearance Urine Clear Clear    Glucose Urine Negative Negative mg/dL    Bilirubin Urine Negative Negative    Ketones Urine Negative Negative mg/dL    Specific Gravity Urine 1.006 1.003 - 1.035     Blood Urine Moderate (A) Negative    pH Urine 6.0 5.0 - 7.0    Protein Albumin Urine Negative Negative mg/dL    Urobilinogen Urine Normal Normal, 2.0 mg/dL    Nitrite Urine Positive (A) Negative    Leukocyte Esterase Urine Moderate (A) Negative    Bacteria Urine Few (A) None Seen /HPF    RBC Urine 1 <=2 /HPF    WBC Urine 11 (H) <=5 /HPF    Narrative    Urine Culture ordered based on laboratory criteria     *Note: Due to a large number of results and/or encounters for the requested time period, some results have not been displayed. A complete set of results can be found in Results Review.       Medications   sodium chloride (PF) 0.9% PF flush 0.2-10 mL (10 mLs Intracatheter $Given 5/6/24 1040)   heparin lock flush 10 UNIT/ML injection 3-6 mL (has no administration in time range)   heparin lock flush 10 UNIT/ML injection 3-6 mL (has no administration in time range)   heparin 100 unit/mL injection 5 mL (5 mLs Intracatheter $Given 5/6/24 1426)   sodium chloride (PF) 0.9% PF flush 10 mL (has no administration in time range)   sodium chloride (PF) 0.9% PF flush 3 mL (has no administration in time range)   ondansetron (ZOFRAN) injection 4 mg (4 mg Intravenous $Given 5/6/24 1418)   morphine (PF) injection 4 mg (4 mg Intravenous $Given 5/6/24 1420)   sodium chloride 0.9% BOLUS 1,000 mL (0 mLs Intravenous Stopped 5/6/24 1347)   cefTRIAXone (ROCEPHIN) 2 g vial to attach to  ml bag for ADULTS or NS 50 ml bag for PEDS (0 g Intravenous Stopped 5/6/24 1417)       Assessments & Plan (with Medical Decision Making)     I have reviewed the nursing notes.    I have reviewed the findings, diagnosis, plan and need for follow up with the patient.    Medical Decision Making  73 female presenting with weakness for the past week as well as generalized decreased appetite.  She has a catheter in the right upper shoulder secondary to history of chemotherapy secondary to bladder cancer.  She also had stents placed 2 weeks ago secondary  to coronary occlusions.  She otherwise feels fine aside from mild chest discomfort.  Denies nausea vomiting or diaphoresis.  She has no abdominal pain or diarrhea.  She denies any changes in her urine output and she has not have a history of UTIs.  Examination is otherwise benign.    Differential is broad due to patient's past medical history at this time.    Troponin 29 repeat at 26 with baseline at mid 60s per patient therefore ACS unlikely with EKG that is unremarkable for abnormal rhythm.  BMP is unremarkable aside from mild elevated creatinine of 1.11.  Her CBC is stable for patient's baseline however does have a significant UTI.  Patient was provided with IV fluids Zofran morphine and had significant resolution of her discomfort and feels much more energetic.  After liter of fluids.  Rocephin was provided IV as well as a prescription outpatient.    With all these findings also recommend following up with cardiology for reevaluation if the chest pain continues or return for reevaluation.  Due to patient's history and various catheterizations that increase her risk for catheter associated infections will order blood cultures to ensure no significant bacteremia.  Patient was send recommendations and plan is happy with this situation as well.  I discussed outpatient follow-up again with her and discharged home    Discharge Medication List as of 5/6/2024  2:28 PM        START taking these medications    Details   nitroFURantoin macrocrystal-monohydrate (MACROBID) 100 MG capsule Take 1 capsule (100 mg) by mouth 2 times daily, Disp-14 capsule, R-0, E-Prescribe             Final diagnoses:   Acute UTI       5/6/2024   [facility] EMERGENCY DEPT       Reji Adame MD  05/06/24 [id number]

## 2024-05-06 NOTE — DISCHARGE INSTRUCTIONS
Provided you with antibiotics for urinary tract infection.  Please return if you feel like your symptoms are worsening.  Otherwise can use Tylenol ibuprofen as needed for pain and fever control.  Please follow-up with primary care doctor and your cardiologist as scheduled.

## 2024-05-06 NOTE — ED TRIAGE NOTES
PT comes in w/ chest pain she describes as indigestion that started today. She had a stent placed 2 weeks ago. She also complains of a fever and weakness x1 week.

## 2024-05-06 NOTE — TELEPHONE ENCOUNTER
OK for workin ANDREE. Please call patient  to schedule time.  Electronically signed by Jean Paniagua MD

## 2024-05-06 NOTE — MEDICATION SCRIBE - ADMISSION MEDICATION HISTORY
"Medication Scribe Admission Medication History    Admission medication history is complete. The information provided in this note is only as accurate as the sources available at the time of the update.    Information Source(s): Patient and CareEverywhere/SureScripts via in-person    Pertinent Information: n/a    Changes made to PTA medication list:  Added: None  Deleted: pepcid, prilosec, compazine  Changed: meclizine updated sig, sucralfate from liquid to tablets and updated sig    Allergies reviewed with patient and updates made in EHR: yes    Medication History Completed By: CELSO GARCIA 5/6/2024 1:28 PM    PTA Med List   Medication Sig Note Last Dose    acetaminophen (TYLENOL) 500 MG tablet Take 1,000 mg by mouth 3 times daily as needed for mild pain (500MG X 2 = 1,000MG)  5/6/2024 at 0700    aspirin (ASA) 81 MG chewable tablet Take 1 tablet (81 mg) by mouth daily  5/6/2024 at 0700    atorvastatin (LIPITOR) 40 MG tablet Take 2 tablets (80 mg) by mouth daily  5/6/2024 at 0700    carvedilol (COREG) 6.25 MG tablet TAKE 1 TABLET (6.25 MG) BY MOUTH 2 TIMES DAILY (WITH MEALS) PLEASE SEND NEXT REFILL TO PRIMARY CARE AS NEPHROLOGY FOLLOW UP \"AS NEEDED\"  5/6/2024 at 0700    clopidogrel (PLAVIX) 75 MG tablet Take 1 tablet (75 mg) by mouth daily  5/6/2024 at 0700    Cyanocobalamin (B-12 PO) Take 1 tablet by mouth daily  5/5/2024 at 0800    escitalopram (LEXAPRO) 10 MG tablet Take 1 tablet (10 mg) by mouth daily  5/6/2024 at 0700    ezetimibe (ZETIA) 10 MG tablet Take 1 tablet (10 mg) by mouth daily  5/6/2024 at 0700    gabapentin (NEURONTIN) 300 MG capsule Take 2 capsules (600 mg) by mouth 3 times daily  5/6/2024 at 0700 1st    levothyroxine (SYNTHROID/LEVOTHROID) 25 MCG tablet TAKE 1 TABLET BY MOUTH EVERY DAY (Patient taking differently: Take 25 mcg by mouth daily)  5/6/2024 at 0700    LORazepam (ATIVAN) 0.5 MG tablet TAKE 1 TABLET (0.5 MG) BY MOUTH EVERY 4 HOURS AS NEEDED FOR ANXIETY, NAUSEA OR SLEEP (Patient taking " differently: Take 0.5 mg by mouth every 4 hours as needed for anxiety, nausea or sleep)  Past Week at hs    meclizine (ANTIVERT) 25 MG tablet Take 12.5 mg by mouth every morning 1/2 tablet (to mitigate nausea from morning meds)  5/6/2024 at 0700    nitroGLYcerin (NITROSTAT) 0.4 MG sublingual tablet For chest pain place 1 tablet under the tongue every 5 minutes for 3 doses. If symptoms persist 5 minutes after 2nd dose call 911. 5/6/2024: Never used on hand at never used    oxyCODONE (ROXICODONE) 5 MG tablet Take 1 tablet (5 mg) by mouth every 6 hours as needed for moderate to severe pain  5/6/2024 at 0700    pantoprazole (PROTONIX) 40 MG EC tablet Take 1 tablet (40 mg) by mouth daily  5/6/2024 at 0700    sucralfate (CARAFATE) 1 GM tablet Take 1 g by mouth 2 times daily as needed for nausea (usually takes before supper)  5/5/2024 at before supper

## 2024-05-07 ENCOUNTER — VIRTUAL VISIT (OUTPATIENT)
Dept: CARDIOLOGY | Facility: CLINIC | Age: 74
End: 2024-05-07
Payer: COMMERCIAL

## 2024-05-07 VITALS — WEIGHT: 140 LBS | BODY MASS INDEX: 23.32 KG/M2 | HEIGHT: 65 IN

## 2024-05-07 DIAGNOSIS — E78.5 HYPERLIPIDEMIA LDL GOAL <130: ICD-10-CM

## 2024-05-07 DIAGNOSIS — I25.10 CORONARY ARTERY DISEASE INVOLVING NATIVE CORONARY ARTERY OF NATIVE HEART WITHOUT ANGINA PECTORIS: Primary | ICD-10-CM

## 2024-05-07 DIAGNOSIS — R94.39 ABNORMAL CARDIOVASCULAR STRESS TEST: ICD-10-CM

## 2024-05-07 DIAGNOSIS — R07.9 CHEST PAIN, UNSPECIFIED TYPE: ICD-10-CM

## 2024-05-07 DIAGNOSIS — I10 HYPERTENSION GOAL BP (BLOOD PRESSURE) < 140/90: ICD-10-CM

## 2024-05-07 LAB — BACTERIA UR CULT: ABNORMAL

## 2024-05-07 PROCEDURE — G2211 COMPLEX E/M VISIT ADD ON: HCPCS | Mod: 95 | Performed by: INTERNAL MEDICINE

## 2024-05-07 PROCEDURE — 99214 OFFICE O/P EST MOD 30 MIN: CPT | Mod: 95 | Performed by: INTERNAL MEDICINE

## 2024-05-07 NOTE — LETTER
5/7/2024    Phani Albright PA-C  [address] [facility] [address]    RE: Michaela Dorman       Dear Colleague,     I had the pleasure of seeing Michaela Dorman in the [facility].  Lissa is a 73 year old who is being evaluated via a billable video visit.    How would you like to obtain your AVS? MyChart  If the video visit is dropped, the invitation should be resent by: Text to cell phone: [phone]  Will anyone else be joining your video visit? No    Video-Visit Details    Type of service:  Video Visit   Video End Time:12:10 PM (30 minutes)  Originating Location (pt. Location): Home    Distant Location (provider location):  On-site  Platform used for Video Visit: [facility] Progress Note  Michaela Dorman MRN# [id number]   YOB: 1950 Age: 73 year old       Reason for visit: Coronary artery disease, recent stenting procedure    History of presenting illness:    I had the opportunity to see Michaela Dorman at [facility] today for reevaluation following coronary angiography and stenting procedure.    She had several episodes of exertional chest discomfort at the gym and when she was cleaning out the stalls for her horses.  I ordered a stress echocardiogram for further evaluation which to me suggested the possibility of ischemia.  She had chest discomfort during exercise on a treadmill during the stress test and minor EKG findings.  I referred her for coronary angiography and she was found to have severe 99% stenosis of the OM1 treated with PCI and placement of overlapping Synergy AMBREEN stents with good result.  She had residual moderate stenosis within the proximal left circumflex at the bifurcation with the OM1 and within the mid LAD which did not appear to be occlusive.  Medical management was recommended.    Initially she did well for the first week, but then contracted a urinary tract infection and became ill with decreased appetite and  a variety of symptoms, including some mild chest discomfort.  She describes this as a lump in her chest.  It feels like the esophagitis that she had in the past.  She was seen in the emergency room for the symptoms and diagnosed with a urinary tract infection and started on antibiotics yesterday.  Cardiac enzymes were negative.  Her EKG was unchanged.  Her chest pain resolved with analgesics.  She is feeling better with IV fluids.                Assessment and Plan:     ASSESSMENT:    Ms. Lissa Dorman is a 73-year-old woman with exertional chest discomfort and an abnormal stress test referred for coronary angiography performed on 4/23/2024 resulting in stenting of a critical stenosis within the OM1.  She has moderate residual disease within the proximal left circumflex and mid LAD, which we are treating medically at this time.  Both lesions appear nonocclusive.    She developed a urinary tract infection recently with a variety of symptoms including some chest discomfort which was not like her previous discomfort prior to her stenting procedure.  He was more like her esophagitis that she had in the past.  Emergency room evaluation did not show any evidence of acute coronary syndrome or myocardial infarction.  I suggested that she try to return to her normal activities, participate in cardiac rehab, and see if the discomfort returns.  We can pursue additional evaluation if needed.    Will meet with her again in 6 to 8 weeks to discuss that chest discomfort further and to ensure that she continues to do well with her recovery.  At that time we will recheck her cholesterol panel.  Her LDL appears to be still a little above target and perhaps adjust her cholesterol medication at that time as well.    Miguelito Aguirre MD           Orders this Visit:  Orders Placed This Encounter   Procedures    Basic metabolic panel    Follow-Up with Cardiology    Follow-Up with Cardiology [facility]    CBC with platelets differential     No orders  "of the defined types were placed in this encounter.    There are no discontinued medications.    Today's clinic visit entailed:    39 minutes spent by me on the date of the encounter doing chart review, history and exam, documentation and further activities per the note  Provider  Link to [facility] Help Grid     The level of medical decision making during this visit was of high complexity.           Review of Systems:     Review of Systems:  Skin:        Eyes:       ENT:       Respiratory:       Cardiovascular:       Gastroenterology:      Genitourinary:       Musculoskeletal:       Neurologic:       Psychiatric:       Heme/Lymph/Imm:       Endocrine:                 Physical Exam:     Vitals: Ht 1.651 m (5' 5\")   Wt 63.5 kg (140 lb)   BMI 23.30 kg/m    Constitutional: Well nourished and in no apparent distress.  Eyes: Pupils equal, round. Sclerae anicteric.   HEENT: Normocephalic, atraumatic.   Neck: Supple. JVD   Respiratory: Breathing non-labored. Lungs clear to auscultation bilaterally. No crackles, wheezes, rhonchi, or rales.  Cardiovascular:  Regular rate and rhythm, normal S1 and S2. No murmur, rub, or gallop.  Skin: Warm, dry. No rashes, cyanosis, or xanthelasma.  Extremities: No edema.  Neurologic: No gross motor deficits. Alert, awake, and oriented to person, place and time.  Psychiatric: Affect appropriate.             Medications:     Current Outpatient Medications   Medication Sig Dispense Refill    acetaminophen (TYLENOL) 500 MG tablet Take 1,000 mg by mouth 3 times daily as needed for mild pain (500MG X 2 = 1,000MG)      aspirin (ASA) 81 MG chewable tablet Take 1 tablet (81 mg) by mouth daily 100 tablet 3    atorvastatin (LIPITOR) 40 MG tablet Take 2 tablets (80 mg) by mouth daily 180 tablet 3    carvedilol (COREG) 6.25 MG tablet TAKE 1 TABLET (6.25 MG) BY MOUTH 2 TIMES DAILY (WITH MEALS) PLEASE SEND NEXT REFILL TO PRIMARY CARE AS NEPHROLOGY FOLLOW UP \"AS NEEDED\" 90 tablet 0    clopidogrel (PLAVIX) 75 MG " tablet Take 1 tablet (75 mg) by mouth daily 90 tablet 3    Cyanocobalamin (B-12 PO) Take 1 tablet by mouth daily      escitalopram (LEXAPRO) 10 MG tablet Take 1 tablet (10 mg) by mouth daily 90 tablet 3    ezetimibe (ZETIA) 10 MG tablet Take 1 tablet (10 mg) by mouth daily 90 tablet 3    gabapentin (NEURONTIN) 300 MG capsule Take 2 capsules (600 mg) by mouth 3 times daily 180 capsule 5    levothyroxine (SYNTHROID/LEVOTHROID) 25 MCG tablet TAKE 1 TABLET BY MOUTH EVERY DAY (Patient taking differently: Take 25 mcg by mouth daily) 90 tablet 1    LORazepam (ATIVAN) 0.5 MG tablet TAKE 1 TABLET (0.5 MG) BY MOUTH EVERY 4 HOURS AS NEEDED FOR ANXIETY, NAUSEA OR SLEEP (Patient taking differently: Take 0.5 mg by mouth every 4 hours as needed for anxiety, nausea or sleep) 25 tablet 0    meclizine (ANTIVERT) 25 MG tablet Take 12.5 mg by mouth every morning 1/2 tablet (to mitigate nausea from morning meds)      nitroFURantoin macrocrystal-monohydrate (MACROBID) 100 MG capsule Take 1 capsule (100 mg) by mouth 2 times daily 14 capsule 0    oxyCODONE (ROXICODONE) 5 MG tablet Take 1 tablet (5 mg) by mouth every 6 hours as needed for moderate to severe pain 30 tablet 0    pantoprazole (PROTONIX) 40 MG EC tablet Take 1 tablet (40 mg) by mouth daily 90 tablet 3    sucralfate (CARAFATE) 1 GM tablet Take 1 g by mouth 2 times daily as needed for nausea (usually takes before supper)      nitroGLYcerin (NITROSTAT) 0.4 MG sublingual tablet For chest pain place 1 tablet under the tongue every 5 minutes for 3 doses. If symptoms persist 5 minutes after 2nd dose call 911. (Patient not taking: Reported on 5/7/2024) 30 tablet 0       Family History   Problem Relation Age of Onset    Hypertension Mother     Thyroid Disease Mother     Cerebrovascular Disease Mother     Hypertension Father     Cerebrovascular Disease Father     Cancer Father         skin    Thyroid Disease Sister     Diabetes Brother         type 2    Depression Sister     Breast  Cancer Sister         age 47    Cancer Sister         skin    Cancer Brother         inside nose       Social History     Socioeconomic History    Marital status:      Spouse name: ozzie    Number of children: 5    Years of education: Not on file    Highest education level: Not on file   Occupational History     Employer: [employer]   Tobacco Use    Smoking status: Former     Current packs/day: 0.00     Average packs/day: 3.0 packs/day for 10.0 years (30.0 ttl pk-yrs)     Types: Cigarettes     Start date: 1969     Quit date: 1979     Years since quittin.4     Passive exposure: Never    Smokeless tobacco: Never    Tobacco comments:     approx. 3 packs daily   Vaping Use    Vaping status: Never Used   Substance and Sexual Activity    Alcohol use: Not Currently     Comment: Stopped drinking a few weeks ago (2023)    Drug use: No    Sexual activity: Yes     Partners: Male   Other Topics Concern     Service Not Asked    Blood Transfusions Not Asked    Caffeine Concern No    Occupational Exposure Not Asked    Hobby Hazards No    Sleep Concern No    Stress Concern Yes    Weight Concern Not Asked    Special Diet Not Asked    Back Care Not Asked    Exercise Yes    Bike Helmet Not Asked    Seat Belt Yes    Self-Exams Not Asked    Parent/sibling w/ CABG, MI or angioplasty before 65F 55M? Not Asked   Social History Narrative    Not on file     Social Determinants of Health     Financial Resource Strain: Low Risk  (2024)    Financial Resource Strain     Within the past 12 months, have you or your family members you live with been unable to get utilities (heat, electricity) when it was really needed?: No   Food Insecurity: Low Risk  (2024)    Food Insecurity     Within the past 12 months, did you worry that your food would run out before you got money to buy more?: No     Within the past 12 months, did the food you bought just not last and you didn t have money to get more?: No    Transportation Needs: Low Risk  (4/25/2024)    Transportation Needs     Within the past 12 months, has lack of transportation kept you from medical appointments, getting your medicines, non-medical meetings or appointments, work, or from getting things that you need?: No   Physical Activity: Sufficiently Active (4/25/2024)    Exercise Vital Sign     Days of Exercise per Week: 5 days     Minutes of Exercise per Session: 100 min   Stress: Stress Concern Present (4/25/2024)    [employer] [employer] - Occupational Stress Questionnaire     Feeling of Stress : To some extent   Social Connections: Unknown (4/25/2024)    Social Connection and Isolation Panel [NHANES]     Frequency of Communication with Friends and Family: Not on file     Frequency of Social Gatherings with Friends and Family: Once a week     Attends Tenriism Services: Not on file     Active Member of Clubs or Organizations: Not on file     Attends Club or Organization Meetings: Not on file     Marital Status: Not on file   Interpersonal Safety: Low Risk  (4/25/2024)    Interpersonal Safety     Do you feel physically and emotionally safe where you currently live?: Yes     Within the past 12 months, have you been hit, slapped, kicked or otherwise physically hurt by someone?: No     Within the past 12 months, have you been humiliated or emotionally abused in other ways by your partner or ex-partner?: No   Housing Stability: Low Risk  (4/25/2024)    Housing Stability     Do you have housing? : Yes     Are you worried about losing your housing?: No            Past Medical History:     Past Medical History:   Diagnosis Date    Acute kidney injury (H24)     Carotid stenosis, bilateral L80%, R40% 09/02/2020    Central stenosis of spinal canal 12/01/2017    lumbar     DDD (degenerative disc disease), lumbar 12/01/2017    Depressive disorder, not elsewhere classified     Esophageal reflux     ETOH abuse     in remission    Foraminal stenosis of  lumbar region 12/01/2017    Complete lumbar spine left at various degrees and to a lesser extent the right as well.    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    Subdural hematoma (H)     Thyroid disease     Urothelial carcinoma (H)               Past Surgical History:     Past Surgical History:   Procedure Laterality Date    ARTHROPLASTY SHOULDER Right 11/17/2015    ARTHROSCOPY KNEE  6/7/2012    Procedure:ARTHROSCOPY KNEE; right knee arthroscopy with partial lateral menisectomy; Surgeon:DAMIÁN MCALLISTER; Location:[facility]    BIOPSY VAGINAL N/A 10/27/2021    Procedure: DISTAL URETHRECTOMY;  Surgeon: Leonardo Robles MD;  Location: [facility] OR    COLONOSCOPY N/A 12/22/2017    Procedure: COLONOSCOPY;  colonoscopy;  Surgeon: Chuck Chand MD;  Location: [facility]    CV CORONARY ANGIOGRAM N/A 4/23/2024    Procedure: Coronary Angiogram;  Surgeon: Leonardo Marinelli MD;  Location:  [facility] CARDIAC CATH LAB    CV INTRAVASULAR ULTRASOUND N/A 4/23/2024    Procedure: Intravascular Ultrasound;  Surgeon: Leonardo Marinelli MD;  Location: [facility] CARDIAC CATH LAB    CV LEFT HEART CATH N/A 4/23/2024    Procedure: Left Heart Catheterization;  Surgeon: Leonardo Marinelli MD;  Location:  HEART CARDIAC CATH LAB    CV PCI STENT DRUG ELUTING N/A 4/23/2024    Procedure: Percutaneous Coronary Intervention Stent;  Surgeon: Leonardo Marinelli MD;  Location: [facility] CARDIAC CATH LAB    CYSTECTOMY BLADDER RADICAL, ILEAL DIVERSION, COMBINED N/A 6/1/2021    Procedure: RADICAL CYSTECTOMY  WITH ILEAL CONDUIT CREATION,BILATERAL PELVIC LYMPHADENECTOMY;  Surgeon: Leonardo Robles MD;  Location: [facility] OR    CYSTOSCOPY, RETROGRADES, COMBINED Bilateral 3/18/2021    Procedure: CYSTOSCOPY, WITH RETROGRADE  PYELOGRAM;  Surgeon: Girish Jack MD;  Location: [facility] OR    CYSTOSCOPY, TRANSURETHRAL RESECTION (TUR) TUMOR BLADDER, COMBINED N/A 3/18/2021    Procedure: CYSTOSCOPY, WITH TRANSURETHRAL RESECTION BLADDER TUMOR;  Surgeon: Girish Jack MD;  Location: [facility] OR    ENDARTERECTOMY CAROTID Left 10/8/2020    Procedure: LEFT CAROTID ENDARTERECTOMY WITH EEG;  Surgeon: Lacho Jasmine MD;  Location: [facility] OR    ESOPHAGOSCOPY, GASTROSCOPY, DUODENOSCOPY (EGD), COMBINED N/A 6/20/2022    Procedure: ESOPHAGOGASTRODUODENOSCOPY, WITH BIOPSY;  Surgeon: Ramses Arenas MD;  Location: [facility]    EXCISE MASS AXILLA Left 9/16/2016    Procedure: EXCISE MASS AXILLA;  Surgeon: Keyon Blanco MD;  Location: [facility]    HC REMV CATARACT EXTRACAP,INSERT LENS, W/O ECP  6/25/2009    Right eye    INJECT EPIDURAL LUMBAR N/A 4/11/2019    Procedure: interlaminar epidual steroid injection lumbar 4-5;  Surgeon: Oskar Salvador MD;  [facility]: [facility]    INJECT EPIDURAL LUMBAR Bilateral 9/12/2019    Procedure: lumbar 4-5 epidural interlaminar steroid injection;  Surgeon: Oskar Salvador MD;  [facility]    INSERT PORT VASCULAR ACCESS Right 10/7/2016    Procedure: INSERT PORT VASCULAR ACCESS;  Surgeon: Keyon Blanco MD;  [facility]    IR CHEST PORT PLACEMENT > 5 YRS OF AGE  4/16/2021    MASTECTOMY SIMPLE BILATERAL Bilateral 2/8/2017    Procedure: MASTECTOMY SIMPLE BILATERAL;  Surgeon: Keyon Blanco MD;  [facility]: [facility]    OPEN REDUCTION INTERNAL FIXATION FOOT Right 3/20/2015    Procedure: OPEN REDUCTION INTERNAL FIXATION FOOT;  Surgeon: Javi Herrmann DPM;  [facility]    OPTICAL TRACKING SYSTEM FUSION SPINE POSTERIOR LUMBAR TWO LEVELS N/A 2/4/2020    Procedure: LUMBAR 2-SACRAL1 TRANSFORMINAL INTERBODY FUSION;  Surgeon: Lenny Gomes MD;  Location: [facility]    REMOVE PORT VASCULAR ACCESS Right 2/14/2018    Procedure: REMOVE PORT VASCULAR ACCESS;  right intra jugular port removal;  Surgeon: Keyon Blanco MD;  [facility]: [facility]     SHOULDER SURGERY Right 11/2015    [facility] LIGATE FALLOPIAN TUBE      [facility] NONSPECIFIC PROCEDURE  1956    ankle fracture surgery              Allergies:   Oxybutynin       Data:   All laboratory data reviewed:    Recent Labs   Lab Test 04/29/24  0816 03/20/24  1306 11/27/23  1316 07/07/23  1415 11/28/22  0958 11/16/22  1404 07/11/22  1500 05/02/22  1115   LDL 85 116* 107*  --   --   --    < >  --    HDL 88 72 84  --   --   --    < >  --    NHDL 106 161* 142*  --   --   --    < >  --    CHOL 194 233* 226*  --   --   --    < >  --    TRIG 106 224* 176*  --   --   --    < >  --    TSH  --   --  2.11 4.04  --  3.08   < > 1.83   IRON  --   --   --   --   --   --   --  103   FEB  --   --   --   --   --   --   --  341   IRONSAT  --   --   --   --   --   --   --  30   DALLIN  --   --   --   --  90  --    < > 49    < > = values in this interval not displayed.       Lab Results   Component Value Date    WBC 8.8 05/06/2024    WBC 13.5 (H) 06/24/2021    RBC 3.23 (L) 05/06/2024    RBC 2.99 (L) 06/24/2021    HGB 10.6 (L) 05/06/2024    HGB 9.6 (L) 06/24/2021    HCT 32.9 (L) 05/06/2024    HCT 30.0 (L) 06/24/2021     (H) 05/06/2024     06/24/2021    MCH 32.8 05/06/2024    MCH 32.1 06/24/2021    MCHC 32.2 05/06/2024    MCHC 32.0 06/24/2021    RDW 13.5 05/06/2024    RDW 14.8 06/24/2021     05/06/2024     06/24/2021       Lab Results   Component Value Date     05/06/2024     06/24/2021    POTASSIUM 3.8 05/06/2024    POTASSIUM 4.8 11/28/2022    POTASSIUM 4.7 06/24/2021    CHLORIDE 98 05/06/2024    CHLORIDE 108 11/28/2022    CHLORIDE 106 06/24/2021    CO2 23 05/06/2024    CO2 26 11/28/2022    CO2 24 06/24/2021    ANIONGAP 14 05/06/2024    ANIONGAP 4 11/28/2022    ANIONGAP 6 06/24/2021     (H) 05/06/2024    GLC 90 11/28/2022     (H) 06/24/2021    BUN 21.0 05/06/2024    BUN 28 11/28/2022    BUN 30 06/24/2021    CR 1.11 (H) 05/06/2024    CR 1.10 (H) 06/24/2021    GFRESTIMATED 52 (L) 05/06/2024     GFRESTIMATED 48 (L) 03/07/2023    GFRESTIMATED 50 (L) 06/24/2021    GFRESTBLACK 58 (L) 06/24/2021    VANDANA 9.9 05/06/2024    VANDANA 9.6 06/24/2021      Lab Results   Component Value Date    AST 24 03/20/2024    AST 20 05/21/2021    ALT 36 04/29/2024    ALT 38 05/21/2021       Lab Results   Component Value Date    A1C 5.4 07/26/2023    A1C 5.2 10/08/2020       Lab Results   Component Value Date    INR 0.85 04/23/2024    INR 0.91 04/16/2021    INR 0.9 10/01/2020    INR 0.88 02/12/2018       NIDA CARTER MD  [facility]    Thank you for allowing me to participate in the care of your patient.      Sincerely,     NIDA CARTER MD     [facility]  cc:   No referring provider defined for this encounter.

## 2024-05-07 NOTE — PROGRESS NOTES
Lissa is a 73 year old who is being evaluated via a billable video visit.    How would you like to obtain your AVS? MyChart  If the video visit is dropped, the invitation should be resent by: Text to cell phone: [phone]  Will anyone else be joining your video visit? No    Video-Visit Details    Type of service:  Video Visit   Video End Time:12:10 PM (30 minutes)  Originating Location (pt. Location): Home    Distant Location (provider location):  On-site  Platform used for Video Visit: [facility] Progress Note  Michaela Dorman MRN# [id number]   YOB: 1950 Age: 73 year old       Reason for visit: Coronary artery disease, recent stenting procedure    History of presenting illness:    I had the opportunity to see Michaela Dorman at [facility] today for reevaluation following coronary angiography and stenting procedure.    She had several episodes of exertional chest discomfort at the gym and when she was cleaning out the stalls for her horses.  I ordered a stress echocardiogram for further evaluation which to me suggested the possibility of ischemia.  She had chest discomfort during exercise on a treadmill during the stress test and minor EKG findings.  I referred her for coronary angiography and she was found to have severe 99% stenosis of the OM1 treated with PCI and placement of overlapping Synergy AMBREEN stents with good result.  She had residual moderate stenosis within the proximal left circumflex at the bifurcation with the OM1 and within the mid LAD which did not appear to be occlusive.  Medical management was recommended.    Initially she did well for the first week, but then contracted a urinary tract infection and became ill with decreased appetite and a variety of symptoms, including some mild chest discomfort.  She describes this as a lump in her chest.  It feels like the esophagitis that she had in the past.  She was seen in the emergency room for  the symptoms and diagnosed with a urinary tract infection and started on antibiotics yesterday.  Cardiac enzymes were negative.  Her EKG was unchanged.  Her chest pain resolved with analgesics.  She is feeling better with IV fluids.                Assessment and Plan:     ASSESSMENT:    Ms. Lissa Dorman is a 73-year-old woman with exertional chest discomfort and an abnormal stress test referred for coronary angiography performed on 4/23/2024 resulting in stenting of a critical stenosis within the OM1.  She has moderate residual disease within the proximal left circumflex and mid LAD, which we are treating medically at this time.  Both lesions appear nonocclusive.    She developed a urinary tract infection recently with a variety of symptoms including some chest discomfort which was not like her previous discomfort prior to her stenting procedure.  He was more like her esophagitis that she had in the past.  Emergency room evaluation did not show any evidence of acute coronary syndrome or myocardial infarction.  I suggested that she try to return to her normal activities, participate in cardiac rehab, and see if the discomfort returns.  We can pursue additional evaluation if needed.    Will meet with her again in 6 to 8 weeks to discuss that chest discomfort further and to ensure that she continues to do well with her recovery.  At that time we will recheck her cholesterol panel.  Her LDL appears to be still a little above target and perhaps adjust her cholesterol medication at that time as well.    Miguelito Aguirre MD           Orders this Visit:  Orders Placed This Encounter   Procedures    Basic metabolic panel    Follow-Up with Cardiology    Follow-Up with Cardiology [facility]    CBC with platelets differential     No orders of the defined types were placed in this encounter.    There are no discontinued medications.    Today's clinic visit entailed:    39 minutes spent by me on the date of the encounter doing chart review,  "history and exam, documentation and further activities per the note  Provider  Link to [facility] Help Grid     The level of medical decision making during this visit was of high complexity.           Review of Systems:     Review of Systems:  Skin:        Eyes:       ENT:       Respiratory:       Cardiovascular:       Gastroenterology:      Genitourinary:       Musculoskeletal:       Neurologic:       Psychiatric:       Heme/Lymph/Imm:       Endocrine:                 Physical Exam:     Vitals: Ht 1.651 m (5' 5\")   Wt 63.5 kg (140 lb)   BMI 23.30 kg/m    Constitutional: Well nourished and in no apparent distress.  Eyes: Pupils equal, round. Sclerae anicteric.   HEENT: Normocephalic, atraumatic.   Neck: Supple. JVD   Respiratory: Breathing non-labored. Lungs clear to auscultation bilaterally. No crackles, wheezes, rhonchi, or rales.  Cardiovascular:  Regular rate and rhythm, normal S1 and S2. No murmur, rub, or gallop.  Skin: Warm, dry. No rashes, cyanosis, or xanthelasma.  Extremities: No edema.  Neurologic: No gross motor deficits. Alert, awake, and oriented to person, place and time.  Psychiatric: Affect appropriate.             Medications:     Current Outpatient Medications   Medication Sig Dispense Refill    acetaminophen (TYLENOL) 500 MG tablet Take 1,000 mg by mouth 3 times daily as needed for mild pain (500MG X 2 = 1,000MG)      aspirin (ASA) 81 MG chewable tablet Take 1 tablet (81 mg) by mouth daily 100 tablet 3    atorvastatin (LIPITOR) 40 MG tablet Take 2 tablets (80 mg) by mouth daily 180 tablet 3    carvedilol (COREG) 6.25 MG tablet TAKE 1 TABLET (6.25 MG) BY MOUTH 2 TIMES DAILY (WITH MEALS) PLEASE SEND NEXT REFILL TO PRIMARY CARE AS NEPHROLOGY FOLLOW UP \"AS NEEDED\" 90 tablet 0    clopidogrel (PLAVIX) 75 MG tablet Take 1 tablet (75 mg) by mouth daily 90 tablet 3    Cyanocobalamin (B-12 PO) Take 1 tablet by mouth daily      escitalopram (LEXAPRO) 10 MG tablet Take 1 tablet (10 mg) by mouth daily 90 tablet 3 "    ezetimibe (ZETIA) 10 MG tablet Take 1 tablet (10 mg) by mouth daily 90 tablet 3    gabapentin (NEURONTIN) 300 MG capsule Take 2 capsules (600 mg) by mouth 3 times daily 180 capsule 5    levothyroxine (SYNTHROID/LEVOTHROID) 25 MCG tablet TAKE 1 TABLET BY MOUTH EVERY DAY (Patient taking differently: Take 25 mcg by mouth daily) 90 tablet 1    LORazepam (ATIVAN) 0.5 MG tablet TAKE 1 TABLET (0.5 MG) BY MOUTH EVERY 4 HOURS AS NEEDED FOR ANXIETY, NAUSEA OR SLEEP (Patient taking differently: Take 0.5 mg by mouth every 4 hours as needed for anxiety, nausea or sleep) 25 tablet 0    meclizine (ANTIVERT) 25 MG tablet Take 12.5 mg by mouth every morning 1/2 tablet (to mitigate nausea from morning meds)      nitroFURantoin macrocrystal-monohydrate (MACROBID) 100 MG capsule Take 1 capsule (100 mg) by mouth 2 times daily 14 capsule 0    oxyCODONE (ROXICODONE) 5 MG tablet Take 1 tablet (5 mg) by mouth every 6 hours as needed for moderate to severe pain 30 tablet 0    pantoprazole (PROTONIX) 40 MG EC tablet Take 1 tablet (40 mg) by mouth daily 90 tablet 3    sucralfate (CARAFATE) 1 GM tablet Take 1 g by mouth 2 times daily as needed for nausea (usually takes before supper)      nitroGLYcerin (NITROSTAT) 0.4 MG sublingual tablet For chest pain place 1 tablet under the tongue every 5 minutes for 3 doses. If symptoms persist 5 minutes after 2nd dose call 911. (Patient not taking: Reported on 5/7/2024) 30 tablet 0       Family History   Problem Relation Age of Onset    Hypertension Mother     Thyroid Disease Mother     Cerebrovascular Disease Mother     Hypertension Father     Cerebrovascular Disease Father     Cancer Father         skin    Thyroid Disease Sister     Diabetes Brother         type 2    Depression Sister     Breast Cancer Sister         age 47    Cancer Sister         skin    Cancer Brother         inside nose       Social History     Socioeconomic History    Marital status:      Spouse name: ozzie    Number of  children: 5    Years of education: Not on file    Highest education level: Not on file   Occupational History     Employer: [employer]   Tobacco Use    Smoking status: Former     Current packs/day: 0.00     Average packs/day: 3.0 packs/day for 10.0 years (30.0 ttl pk-yrs)     Types: Cigarettes     Start date: 1969     Quit date: 1979     Years since quittin.4     Passive exposure: Never    Smokeless tobacco: Never    Tobacco comments:     approx. 3 packs daily   Vaping Use    Vaping status: Never Used   Substance and Sexual Activity    Alcohol use: Not Currently     Comment: Stopped drinking a few weeks ago (2023)    Drug use: No    Sexual activity: Yes     Partners: Male   Other Topics Concern     Service Not Asked    Blood Transfusions Not Asked    Caffeine Concern No    Occupational Exposure Not Asked    Hobby Hazards No    Sleep Concern No    Stress Concern Yes    Weight Concern Not Asked    Special Diet Not Asked    Back Care Not Asked    Exercise Yes    Bike Helmet Not Asked    Seat Belt Yes    Self-Exams Not Asked    Parent/sibling w/ CABG, MI or angioplasty before 65F 55M? Not Asked   Social History Narrative    Not on file     Social Determinants of Health     Financial Resource Strain: Low Risk  (2024)    Financial Resource Strain     Within the past 12 months, have you or your family members you live with been unable to get utilities (heat, electricity) when it was really needed?: No   Food Insecurity: Low Risk  (2024)    Food Insecurity     Within the past 12 months, did you worry that your food would run out before you got money to buy more?: No     Within the past 12 months, did the food you bought just not last and you didn t have money to get more?: No   Transportation Needs: Low Risk  (2024)    Transportation Needs     Within the past 12 months, has lack of transportation kept you from medical appointments, getting your medicines, non-medical meetings or  appointments, work, or from getting things that you need?: No   Physical Activity: Sufficiently Active (4/25/2024)    Exercise Vital Sign     Days of Exercise per Week: 5 days     Minutes of Exercise per Session: 100 min   Stress: Stress Concern Present (4/25/2024)    [employer] [place] [place] [employer] - Occupational Stress Questionnaire     Feeling of Stress : To some extent   Social Connections: Unknown (4/25/2024)    Social Connection and Isolation Panel [NHANES]     Frequency of Communication with Friends and Family: Not on file     Frequency of Social Gatherings with Friends and Family: Once a week     Attends Episcopal Services: Not on file     Active Member of Clubs or Organizations: Not on file     Attends Club or Organization Meetings: Not on file     Marital Status: Not on file   Interpersonal Safety: Low Risk  (4/25/2024)    Interpersonal Safety     Do you feel physically and emotionally safe where you currently live?: Yes     Within the past 12 months, have you been hit, slapped, kicked or otherwise physically hurt by someone?: No     Within the past 12 months, have you been humiliated or emotionally abused in other ways by your partner or ex-partner?: No   Housing Stability: Low Risk  (4/25/2024)    Housing Stability     Do you have housing? : Yes     Are you worried about losing your housing?: No            Past Medical History:     Past Medical History:   Diagnosis Date    Acute kidney injury (H24)     Carotid stenosis, bilateral L80%, R40% 09/02/2020    Central stenosis of spinal canal 12/01/2017    lumbar     DDD (degenerative disc disease), lumbar 12/01/2017    Depressive disorder, not elsewhere classified     Esophageal reflux     ETOH abuse     in remission    Foraminal stenosis of lumbar region 12/01/2017    Complete lumbar spine left at various degrees and to a lesser extent the right as well.    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    Subdural hematoma (H)     Thyroid disease     Urothelial carcinoma (H)               Past Surgical History:     Past Surgical History:   Procedure Laterality Date    ARTHROPLASTY SHOULDER Right 11/17/2015    ARTHROSCOPY KNEE  6/7/2012    Procedure:ARTHROSCOPY KNEE; right knee arthroscopy with partial lateral menisectomy; Surgeon:DAMIÁN MCALLISTER; Location:[facility] OR    BIOPSY VAGINAL N/A 10/27/2021    Procedure: DISTAL URETHRECTOMY;  Surgeon: Leonardo Robles MD;  Location: [facility] OR    COLONOSCOPY N/A 12/22/2017    Procedure: COLONOSCOPY;  colonoscopy;  Surgeon: Chuck Chand MD;  Location: [facility]    CV CORONARY ANGIOGRAM N/A 4/23/2024    Procedure: Coronary Angiogram;  Surgeon: Leonardo Marinelli MD;  Location: [facility] CARDIAC CATH LAB    CV INTRAVASULAR ULTRASOUND N/A 4/23/2024    Procedure: Intravascular Ultrasound;  Surgeon: Leonardo Marinelli MD;  Location: [facility] CARDIAC CATH LAB    CV LEFT HEART CATH N/A 4/23/2024    Procedure: Left Heart Catheterization;  Surgeon: Leonardo Marinelli MD;  Location: [facility] CARDIAC CATH LAB    CV PCI STENT DRUG ELUTING N/A 4/23/2024    Procedure: Percutaneous Coronary Intervention Stent;  Surgeon: Leonardo Marinelli MD;  Location: [facility] CARDIAC CATH LAB    CYSTECTOMY BLADDER RADICAL, ILEAL DIVERSION, COMBINED N/A 6/1/2021    Procedure: RADICAL CYSTECTOMY  WITH ILEAL CONDUIT CREATION,BILATERAL PELVIC LYMPHADENECTOMY;  Surgeon: Leonardo Robles MD;  Location: [facility] OR    CYSTOSCOPY, RETROGRADES, COMBINED Bilateral 3/18/2021    Procedure: CYSTOSCOPY, WITH RETROGRADE PYELOGRAM;  Surgeon: Girish Jack MD;  Location: [facility] OR    CYSTOSCOPY, TRANSURETHRAL RESECTION (TUR) TUMOR BLADDER, COMBINED N/A 3/18/2021    Procedure: CYSTOSCOPY, WITH TRANSURETHRAL RESECTION BLADDER TUMOR;   Surgeon: Girish Jack MD;  Location: [facility] OR    ENDARTERECTOMY CAROTID Left 10/8/2020    Procedure: LEFT CAROTID ENDARTERECTOMY WITH EEG;  Surgeon: Lacho Jasmine MD;  Location: [facility] OR    ESOPHAGOSCOPY, GASTROSCOPY, DUODENOSCOPY (EGD), COMBINED N/A 6/20/2022    Procedure: ESOPHAGOGASTRODUODENOSCOPY, WITH BIOPSY;  Surgeon: Ramses Arenas MD;  Location: [facility]    EXCISE MASS AXILLA Left 9/16/2016    Procedure: EXCISE MASS AXILLA;  Surgeon: Keyon Blanco MD;  Location: [facility]    HC REMV CATARACT EXTRACAP,INSERT LENS, W/O ECP  6/25/2009    Right eye    INJECT EPIDURAL LUMBAR N/A 4/11/2019    Procedure: interlaminar epidual steroid injection lumbar 4-5;  Surgeon: Oskar Salvador MD;  [facility]: [facility]    INJECT EPIDURAL LUMBAR Bilateral 9/12/2019    Procedure: lumbar 4-5 epidural interlaminar steroid injection;  Surgeon: Oskar Salvador MD;  [facility]    INSERT PORT VASCULAR ACCESS Right 10/7/2016    Procedure: INSERT PORT VASCULAR ACCESS;  Surgeon: Keyon Blanco MD;  [facility]: [facility]    IR CHEST PORT PLACEMENT > 5 YRS OF AGE  4/16/2021    MASTECTOMY SIMPLE BILATERAL Bilateral 2/8/2017    Procedure: MASTECTOMY SIMPLE BILATERAL;  Surgeon: Keyon Blanco MD;  [facility]: [facility]    OPEN REDUCTION INTERNAL FIXATION FOOT Right 3/20/2015    Procedure: OPEN REDUCTION INTERNAL FIXATION FOOT;  Surgeon: Javi Herrmann DPM;  [facility]: [facility]    OPTICAL TRACKING SYSTEM FUSION SPINE POSTERIOR LUMBAR TWO LEVELS N/A 2/4/2020    Procedure: LUMBAR 2-SACRAL1 TRANSFORMINAL INTERBODY FUSION;  Surgeon: Lenny Gomes MD;  Location:  OR    REMOVE PORT VASCULAR ACCESS Right 2/14/2018    Procedure: REMOVE PORT VASCULAR ACCESS;  right intra jugular port removal;  Surgeon: Keyon Blanco MD;  Location: [facility]    SHOULDER SURGERY Right 11/2015    Z LIGATE FALLOPIAN TUBE      ZZC NONSPECIFIC PROCEDURE  1956    ankle fracture surgery              Allergies:   Oxybutynin       Data:   All laboratory data  reviewed:    Recent Labs   Lab Test 04/29/24  0816 03/20/24  1306 11/27/23  1316 07/07/23  1415 11/28/22  0958 11/16/22  1404 07/11/22  1500 05/02/22  1115   LDL 85 116* 107*  --   --   --    < >  --    HDL 88 72 84  --   --   --    < >  --    NHDL 106 161* 142*  --   --   --    < >  --    CHOL 194 233* 226*  --   --   --    < >  --    TRIG 106 224* 176*  --   --   --    < >  --    TSH  --   --  2.11 4.04  --  3.08   < > 1.83   IRON  --   --   --   --   --   --   --  103   FEB  --   --   --   --   --   --   --  341   IRONSAT  --   --   --   --   --   --   --  30   DALLIN  --   --   --   --  90  --    < > 49    < > = values in this interval not displayed.       Lab Results   Component Value Date    WBC 8.8 05/06/2024    WBC 13.5 (H) 06/24/2021    RBC 3.23 (L) 05/06/2024    RBC 2.99 (L) 06/24/2021    HGB 10.6 (L) 05/06/2024    HGB 9.6 (L) 06/24/2021    HCT 32.9 (L) 05/06/2024    HCT 30.0 (L) 06/24/2021     (H) 05/06/2024     06/24/2021    MCH 32.8 05/06/2024    MCH 32.1 06/24/2021    MCHC 32.2 05/06/2024    MCHC 32.0 06/24/2021    RDW 13.5 05/06/2024    RDW 14.8 06/24/2021     05/06/2024     06/24/2021       Lab Results   Component Value Date     05/06/2024     06/24/2021    POTASSIUM 3.8 05/06/2024    POTASSIUM 4.8 11/28/2022    POTASSIUM 4.7 06/24/2021    CHLORIDE 98 05/06/2024    CHLORIDE 108 11/28/2022    CHLORIDE 106 06/24/2021    CO2 23 05/06/2024    CO2 26 11/28/2022    CO2 24 06/24/2021    ANIONGAP 14 05/06/2024    ANIONGAP 4 11/28/2022    ANIONGAP 6 06/24/2021     (H) 05/06/2024    GLC 90 11/28/2022     (H) 06/24/2021    BUN 21.0 05/06/2024    BUN 28 11/28/2022    BUN 30 06/24/2021    CR 1.11 (H) 05/06/2024    CR 1.10 (H) 06/24/2021    GFRESTIMATED 52 (L) 05/06/2024    GFRESTIMATED 48 (L) 03/07/2023    GFRESTIMATED 50 (L) 06/24/2021    GFRESTBLACK 58 (L) 06/24/2021    VANDANA 9.9 05/06/2024    VANDANA 9.6 06/24/2021      Lab Results   Component Value Date    AST 24  03/20/2024    AST 20 05/21/2021    ALT 36 04/29/2024    ALT 38 05/21/2021       Lab Results   Component Value Date    A1C 5.4 07/26/2023    A1C 5.2 10/08/2020       Lab Results   Component Value Date    INR 0.85 04/23/2024    INR 0.91 04/16/2021    INR 0.9 10/01/2020    INR 0.88 02/12/2018         NIDA CARTER MD  [facility]

## 2024-05-07 NOTE — TELEPHONE ENCOUNTER
Spoke with patient and she stated she was seen in urgent care yesterday and no longer needed an appointment with Dr. Paniagua.     Closing encounter.   Leola aCntu MA

## 2024-05-08 ENCOUNTER — TELEPHONE (OUTPATIENT)
Dept: EMERGENCY MEDICINE | Facility: CLINIC | Age: 74
End: 2024-05-08
Payer: COMMERCIAL

## 2024-05-08 ENCOUNTER — TELEPHONE (OUTPATIENT)
Dept: FAMILY MEDICINE | Facility: OTHER | Age: 74
End: 2024-05-08
Payer: COMMERCIAL

## 2024-05-08 NOTE — TELEPHONE ENCOUNTER
Patient returning call. Relayed below information.     Symptoms: Patient states she never had urinary symptoms to begin with, but is feeling much better. No fever today.     Reviewed return precautions with patient and advised she finish her course of ABX. She is agreeable with plan and verbalized understanding.     Jose Vidal RN  [facility]  Emergency Dept Lab Result RN  Ph# [phone]

## 2024-05-08 NOTE — TELEPHONE ENCOUNTER
INCOMING FORMS    Sender: [employer]    Type of Form, letter or note (What is requested?): order    How was the form received?: Fax    How should forms be returned?:  Fax : [phone]    Form placed in [facility] bin for review/signature if appropriate.

## 2024-05-08 NOTE — TELEPHONE ENCOUNTER
[facility]    Reason for call: Lab Result Notification     Lab Result (including Rx patient on, if applicable).  If culture, copy of lab report at bottom.  Lab Result: See below  nitroFURantoin macrocrystal-monohydrate (MACROBID) 100 MG capsule BID x 7 days     Creatinine Level (mg/dl)   Creatinine   Date Value Ref Range Status   05/06/2024 1.11 (H) 0.51 - 0.95 mg/dL Final   06/24/2021 1.10 (H) 0.52 - 1.04 mg/dL Final    Creatinine clearance (ml/min), if applicable    Serum creatinine: 1.11 mg/dL (H) 05/06/24 1038  Estimated creatinine clearance: 44.6 mL/min (A)     Patient's current Symptoms:   Left voicemail message requesting a call back to [facility] ED Lab Result RN at [phone]. RN is available every day between 9 a.m. and 5:30 p.m. MyChart letter sent.     RN Recommendations/Instructions per [place] ED lab result protocol:   [facility] ED lab result protocol utilized: urine culture      Jose Vidal, COLE

## 2024-05-09 ENCOUNTER — HOSPITAL ENCOUNTER (OUTPATIENT)
Dept: CARDIAC REHAB | Facility: CLINIC | Age: 74
Discharge: HOME | End: 2024-05-09
Attending: INTERNAL MEDICINE
Payer: MEDICARE

## 2024-05-09 DIAGNOSIS — R94.39 ABNORMAL CARDIOVASCULAR STRESS TEST: ICD-10-CM

## 2024-05-09 DIAGNOSIS — I25.10 CORONARY ARTERY DISEASE INVOLVING NATIVE CORONARY ARTERY OF NATIVE HEART WITHOUT ANGINA PECTORIS: ICD-10-CM

## 2024-05-09 PROCEDURE — 93798 PHYS/QHP OP CAR RHAB W/ECG: CPT

## 2024-05-11 LAB
BACTERIA BLD CULT: NO GROWTH
BACTERIA BLD CULT: NO GROWTH

## 2024-05-13 ENCOUNTER — HOSPITAL ENCOUNTER (OUTPATIENT)
Dept: CARDIAC REHAB | Facility: CLINIC | Age: 74
Discharge: HOME | End: 2024-05-13
Attending: INTERNAL MEDICINE
Payer: MEDICARE

## 2024-05-13 PROCEDURE — 93798 PHYS/QHP OP CAR RHAB W/ECG: CPT

## 2024-05-14 ENCOUNTER — MYC MEDICAL ADVICE (OUTPATIENT)
Dept: FAMILY MEDICINE | Facility: OTHER | Age: 74
End: 2024-05-14
Payer: COMMERCIAL

## 2024-05-14 DIAGNOSIS — M25.551 CHRONIC RIGHT HIP PAIN: Primary | ICD-10-CM

## 2024-05-14 DIAGNOSIS — G89.29 CHRONIC RIGHT HIP PAIN: Primary | ICD-10-CM

## 2024-05-15 ENCOUNTER — HOSPITAL ENCOUNTER (OUTPATIENT)
Dept: CARDIAC REHAB | Facility: CLINIC | Age: 74
Discharge: HOME | End: 2024-05-15
Attending: INTERNAL MEDICINE
Payer: MEDICARE

## 2024-05-15 PROCEDURE — 93798 PHYS/QHP OP CAR RHAB W/ECG: CPT

## 2024-05-16 ENCOUNTER — TELEPHONE (OUTPATIENT)
Dept: UROLOGY | Facility: CLINIC | Age: 74
End: 2024-05-16
Payer: COMMERCIAL

## 2024-05-16 NOTE — TELEPHONE ENCOUNTER
Spoke with the patient to reschedule her CT to before her follow up with Dr. Robles. Warm transferred to an imaging coordinator to reschedule sooner

## 2024-05-22 ENCOUNTER — HOSPITAL ENCOUNTER (OUTPATIENT)
Dept: CARDIAC REHAB | Facility: CLINIC | Age: 74
Discharge: HOME | End: 2024-05-22
Attending: INTERNAL MEDICINE
Payer: MEDICARE

## 2024-05-22 PROCEDURE — 93798 PHYS/QHP OP CAR RHAB W/ECG: CPT

## 2024-05-22 RX ORDER — HEPARIN SODIUM (PORCINE) LOCK FLUSH IV SOLN 100 UNIT/ML 100 UNIT/ML
5 SOLUTION INTRAVENOUS
Status: CANCELLED | OUTPATIENT
Start: 2024-05-24

## 2024-05-23 DIAGNOSIS — M54.16 LUMBAR RADICULOPATHY: ICD-10-CM

## 2024-05-23 DIAGNOSIS — G89.4 CHRONIC PAIN SYNDROME: ICD-10-CM

## 2024-05-23 DIAGNOSIS — M51.369 DDD (DEGENERATIVE DISC DISEASE), LUMBAR: ICD-10-CM

## 2024-05-23 DIAGNOSIS — M48.061 FORAMINAL STENOSIS OF LUMBAR REGION: ICD-10-CM

## 2024-05-23 RX ORDER — OXYCODONE HYDROCHLORIDE 5 MG/1
5 TABLET ORAL EVERY 6 HOURS PRN
Qty: 30 TABLET | Refills: 0 | Status: SHIPPED | OUTPATIENT
Start: 2024-05-23 | End: 2024-06-25

## 2024-05-24 ENCOUNTER — INFUSION THERAPY VISIT (OUTPATIENT)
Dept: INFUSION THERAPY | Facility: CLINIC | Age: 74
End: 2024-05-24
Attending: INTERNAL MEDICINE
Payer: MEDICARE

## 2024-05-24 ENCOUNTER — HOSPITAL ENCOUNTER (OUTPATIENT)
Dept: CT IMAGING | Facility: CLINIC | Age: 74
Discharge: HOME | End: 2024-05-24
Attending: INTERNAL MEDICINE
Payer: MEDICARE

## 2024-05-24 DIAGNOSIS — Z17.1 MALIGNANT NEOPLASM OF UPPER-OUTER QUADRANT OF LEFT BREAST IN FEMALE, ESTROGEN RECEPTOR NEGATIVE (H): Primary | ICD-10-CM

## 2024-05-24 DIAGNOSIS — C50.412 MALIGNANT NEOPLASM OF UPPER-OUTER QUADRANT OF LEFT BREAST IN FEMALE, ESTROGEN RECEPTOR NEGATIVE (H): Primary | ICD-10-CM

## 2024-05-24 DIAGNOSIS — C77.5 SECONDARY AND UNSPECIFIED MALIGNANT NEOPLASM OF INTRAPELVIC LYMPH NODES (H): ICD-10-CM

## 2024-05-24 DIAGNOSIS — C67.9 UROTHELIAL CARCINOMA OF BLADDER WITH INVASION OF MUSCLE (H): ICD-10-CM

## 2024-05-24 PROCEDURE — 250N000009 HC RX 250: Performed by: INTERNAL MEDICINE

## 2024-05-24 PROCEDURE — 250N000011 HC RX IP 250 OP 636: Performed by: INTERNAL MEDICINE

## 2024-05-24 PROCEDURE — 71260 CT THORAX DX C+: CPT | Mod: MG

## 2024-05-24 RX ORDER — HEPARIN SODIUM (PORCINE) LOCK FLUSH IV SOLN 100 UNIT/ML 100 UNIT/ML
500 SOLUTION INTRAVENOUS EVERY 8 HOURS
Status: DISCONTINUED | OUTPATIENT
Start: 2024-05-24 | End: 2024-05-24 | Stop reason: HOSPADM

## 2024-05-24 RX ORDER — IOPAMIDOL 755 MG/ML
500 INJECTION, SOLUTION INTRAVASCULAR ONCE
Status: COMPLETED | OUTPATIENT
Start: 2024-05-24 | End: 2024-05-24

## 2024-05-24 RX ORDER — HEPARIN SODIUM (PORCINE) LOCK FLUSH IV SOLN 100 UNIT/ML 100 UNIT/ML
5 SOLUTION INTRAVENOUS
OUTPATIENT
Start: 2024-05-24

## 2024-05-24 RX ADMIN — IOPAMIDOL 69 ML: 755 INJECTION, SOLUTION INTRAVENOUS at 11:54

## 2024-05-24 RX ADMIN — SODIUM CHLORIDE 60 ML: 9 INJECTION, SOLUTION INTRAVENOUS at 11:53

## 2024-05-24 RX ADMIN — HEPARIN 500 UNITS: 100 SYRINGE at 12:27

## 2024-05-24 NOTE — PROGRESS NOTES
Infusion Nursing Note:  Michaela Dorman presents today for port access prior to CT scan.    Patient seen by provider today: No   present during visit today: Not Applicable.    Note: N/A.      Intravenous Access:  Implanted Port.    Treatment Conditions:  Not Applicable.      Post Infusion Assessment:  Patient tolerated port access without incident.  Blood return noted pre and post infusion.  Site patent and intact, free from redness, edema or discomfort.  No evidence of extravasations.  Access discontinued per protocol.       Discharge Plan:   Patient discharged in stable condition accompanied by: self.  Departure Mode: Ambulatory.      Susanne Yu RN

## 2024-05-28 ENCOUNTER — VIRTUAL VISIT (OUTPATIENT)
Dept: UROLOGY | Facility: CLINIC | Age: 74
End: 2024-05-28
Payer: COMMERCIAL

## 2024-05-28 ENCOUNTER — OFFICE VISIT (OUTPATIENT)
Dept: CARDIOLOGY | Facility: CLINIC | Age: 74
End: 2024-05-28
Payer: COMMERCIAL

## 2024-05-28 VITALS
HEIGHT: 65 IN | HEART RATE: 73 BPM | OXYGEN SATURATION: 96 % | SYSTOLIC BLOOD PRESSURE: 122 MMHG | WEIGHT: 145 LBS | RESPIRATION RATE: 18 BRPM | BODY MASS INDEX: 24.16 KG/M2 | DIASTOLIC BLOOD PRESSURE: 68 MMHG

## 2024-05-28 DIAGNOSIS — C67.9 UROTHELIAL CARCINOMA OF BLADDER WITH INVASION OF MUSCLE (H): Primary | ICD-10-CM

## 2024-05-28 DIAGNOSIS — R07.9 CHEST PAIN, UNSPECIFIED TYPE: Primary | ICD-10-CM

## 2024-05-28 DIAGNOSIS — I20.0 UNSTABLE ANGINA (H): ICD-10-CM

## 2024-05-28 PROCEDURE — 99213 OFFICE O/P EST LOW 20 MIN: CPT | Mod: 95 | Performed by: UROLOGY

## 2024-05-28 PROCEDURE — 93000 ELECTROCARDIOGRAM COMPLETE: CPT | Performed by: INTERNAL MEDICINE

## 2024-05-28 PROCEDURE — 99215 OFFICE O/P EST HI 40 MIN: CPT | Performed by: INTERNAL MEDICINE

## 2024-05-28 ASSESSMENT — PAIN SCALES - GENERAL: PAINLEVEL: NO PAIN (0)

## 2024-05-28 NOTE — LETTER
5/28/2024    Phani Albright PA-C  [address]  [facility] [address]    RE: Michaela Dorman       Dear Colleague,     I had the pleasure of seeing Michaela Dorman in the [facility].           Vascular Cardiology Consultation      HPI:     This is a 73 yr old female with PMH significant for CAD s/p PCI recently end of April.     Cath report reviewed - 99% stenosis of OM1, treated with HD IVUS guided PCI and placement of overlapping Synergy AMBREEN (2.5 x 20 mm, 2.5 x 16 mm), postdilated up to 2.75 mm proximally.. Elsewhere, there is a 60% stenosis of the proximal LCx at the bifurcation with OM1, with a minimal luminal area on IVUS of about 4.2 mm . This is severely calcified and PCI would require calcium modification (at least shockwave, possibly atherectomy).   3. The mid LAD is severely calcified with a roughly 50% stenosis. Given that the OM1 lesion was the clear culprit for the patient's symptoms, and PCI of either the LCx/OM1 bifurcation or the mid LAD would require extensive calcium modification, would recommend medical management of her other coronary disease for now. If refractory angina persists despite OM1 PCI and optimal medical therapy, could return to the Cath Lab for IFR/FFR of the proximal LCx and mid LAD lesions, with complex PCI as appropriate.     She is here for urgent visit for acute onset of chest pain and pressure over the weekend. She developed shortness of breath in the shower and then Sunday morning chest pressure woke her up from sleep. Took a SL nitroglycerin which helped improved her pain. She had EKG today showing some changes in V3 from prior, axis deviation. She has been compliant with her DAPT. BP well controlled today.    ASSESSMENT/PLAN:    1. Chest pressure: will evaluate stent placement and also consider IFR/FFR of prox LCX/mid LAD and complex PCI  -coronary angiogram at [facility]. Risks and benefits discussed and patient wishes to proceed  -echocardiogram  -one  "month CORI     Keshia Choi MD MSC          PAST MEDICAL HISTORY  Past Medical History:   Diagnosis Date    Acute kidney injury (H24)     Carotid stenosis, bilateral L80%, R40% 09/02/2020    Central stenosis of spinal canal 12/01/2017    lumbar     DDD (degenerative disc disease), lumbar 12/01/2017    Depressive disorder, not elsewhere classified     Esophageal reflux     ETOH abuse     in remission    Foraminal stenosis of lumbar region 12/01/2017    Complete lumbar spine left at various degrees and to a lesser extent the right as well.    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    Subdural hematoma (H)     Thyroid disease     Urothelial carcinoma (H)        CURRENT MEDICATIONS  Current Outpatient Medications   Medication Sig Dispense Refill    acetaminophen (TYLENOL) 500 MG tablet Take 1,000 mg by mouth 3 times daily as needed for mild pain (500MG X 2 = 1,000MG)      aspirin (ASA) 81 MG chewable tablet Take 1 tablet (81 mg) by mouth daily 100 tablet 3    atorvastatin (LIPITOR) 40 MG tablet Take 2 tablets (80 mg) by mouth daily 180 tablet 3    carvedilol (COREG) 6.25 MG tablet TAKE 1 TABLET (6.25 MG) BY MOUTH 2 TIMES DAILY (WITH MEALS) PLEASE SEND NEXT REFILL TO PRIMARY CARE AS NEPHROLOGY FOLLOW UP \"AS NEEDED\" 90 tablet 0    clopidogrel (PLAVIX) 75 MG tablet Take 1 tablet (75 mg) by mouth daily 90 tablet 3    Cyanocobalamin (B-12 PO) Take 1 tablet by mouth daily      escitalopram (LEXAPRO) 10 MG tablet Take 1 tablet (10 mg) by mouth daily 90 tablet 3    ezetimibe (ZETIA) 10 MG tablet Take 1 tablet (10 mg) by mouth daily 90 tablet 3    gabapentin (NEURONTIN) 300 MG capsule Take 2 capsules (600 mg) by mouth 3 times daily 180 capsule 5    levothyroxine (SYNTHROID/LEVOTHROID) 25 MCG tablet TAKE 1 TABLET BY MOUTH " EVERY DAY (Patient taking differently: Take 25 mcg by mouth daily) 90 tablet 1    LORazepam (ATIVAN) 0.5 MG tablet TAKE 1 TABLET (0.5 MG) BY MOUTH EVERY 4 HOURS AS NEEDED FOR ANXIETY, NAUSEA OR SLEEP (Patient taking differently: Take 0.5 mg by mouth every 4 hours as needed for anxiety, nausea or sleep) 25 tablet 0    meclizine (ANTIVERT) 25 MG tablet Take 12.5 mg by mouth every morning 1/2 tablet (to mitigate nausea from morning meds)      nitroGLYcerin (NITROSTAT) 0.4 MG sublingual tablet For chest pain place 1 tablet under the tongue every 5 minutes for 3 doses. If symptoms persist 5 minutes after 2nd dose call 911. 30 tablet 0    oxyCODONE (ROXICODONE) 5 MG tablet TAKE 1 TABLET (5 MG) BY MOUTH EVERY 6 HOURS AS NEEDED FOR MODERATE TO SEVERE PAIN 30 tablet 0    pantoprazole (PROTONIX) 40 MG EC tablet Take 1 tablet (40 mg) by mouth daily 90 tablet 3    sucralfate (CARAFATE) 1 GM tablet Take 1 g by mouth 2 times daily as needed for nausea (usually takes before supper)      nitroFURantoin macrocrystal-monohydrate (MACROBID) 100 MG capsule Take 1 capsule (100 mg) by mouth 2 times daily (Patient not taking: Reported on 5/28/2024) 14 capsule 0       PAST SURGICAL HISTORY:  Past Surgical History:   Procedure Laterality Date    ARTHROPLASTY SHOULDER Right 11/17/2015    ARTHROSCOPY KNEE  6/7/2012    Procedure:ARTHROSCOPY KNEE; right knee arthroscopy with partial lateral menisectomy; Surgeon:DAMIÁN MCALLISTER; Location:[facility]    BIOPSY VAGINAL N/A 10/27/2021    Procedure: DISTAL URETHRECTOMY;  Surgeon: Leonardo Robles MD;  Location: [facility] OR    COLONOSCOPY N/A 12/22/2017    Procedure: COLONOSCOPY;  colonoscopy;  Surgeon: Chuck Chand MD;  Location: [facility]    CV CORONARY ANGIOGRAM N/A 4/23/2024    Procedure: Coronary Angiogram;  Surgeon: Leonarod Marinelli MD;  Location:  HEART CARDIAC CATH LAB    CV INTRAVASULAR ULTRASOUND N/A 4/23/2024    Procedure: Intravascular Ultrasound;  Surgeon: Leonardo Marinelli  MD Vamshi;  Location:  HEART CARDIAC CATH LAB    CV LEFT HEART CATH N/A 4/23/2024    Procedure: Left Heart Catheterization;  Surgeon: Leonardo Marinelli MD;  Location:  HEART CARDIAC CATH LAB    CV PCI STENT DRUG ELUTING N/A 4/23/2024    Procedure: Percutaneous Coronary Intervention Stent;  Surgeon: Leonardo Marinelli MD;  Location:  HEART CARDIAC CATH LAB    CYSTECTOMY BLADDER RADICAL, ILEAL DIVERSION, COMBINED N/A 6/1/2021    Procedure: RADICAL CYSTECTOMY  WITH ILEAL CONDUIT CREATION,BILATERAL PELVIC LYMPHADENECTOMY;  Surgeon: Leonardo Robles MD;  Location: [facility] OR    CYSTOSCOPY, RETROGRADES, COMBINED Bilateral 3/18/2021    Procedure: CYSTOSCOPY, WITH RETROGRADE PYELOGRAM;  Surgeon: Girish Jack MD;  Location: [facility] OR    CYSTOSCOPY, TRANSURETHRAL RESECTION (TUR) TUMOR BLADDER, COMBINED N/A 3/18/2021    Procedure: CYSTOSCOPY, WITH TRANSURETHRAL RESECTION BLADDER TUMOR;  Surgeon: Girish Jack MD;  Location: [facility] OR    ENDARTERECTOMY CAROTID Left 10/8/2020    Procedure: LEFT CAROTID ENDARTERECTOMY WITH EEG;  Surgeon: Lacho Jasmine MD;  Location:  OR    ESOPHAGOSCOPY, GASTROSCOPY, DUODENOSCOPY (EGD), COMBINED N/A 6/20/2022    Procedure: ESOPHAGOGASTRODUODENOSCOPY, WITH BIOPSY;  Surgeon: Ramses Arenas MD;  Location:  GI    EXCISE MASS AXILLA Left 9/16/2016    Procedure: EXCISE MASS AXILLA;  Surgeon: Keyon Blanco MD;  Location: [facility] OR    HC REMV CATARACT EXTRACAP,INSERT LENS, W/O ECP  6/25/2009    Right eye    INJECT EPIDURAL LUMBAR N/A 4/11/2019    Procedure: interlaminar epidual steroid injection lumbar 4-5;  Surgeon: Oskar Salvador MD;  Location: [facility]    INJECT EPIDURAL LUMBAR Bilateral 9/12/2019    Procedure: lumbar 4-5 epidural interlaminar steroid injection;  Surgeon: Oskar Salvador MD;  Location: [facility]    INSERT PORT VASCULAR ACCESS Right 10/7/2016    Procedure: INSERT PORT VASCULAR ACCESS;  Surgeon: Keyon Blanco MD;  Location: [facility]    IR CHEST PORT PLACEMENT  > 5 YRS OF AGE  2021    MASTECTOMY SIMPLE BILATERAL Bilateral 2017    Procedure: MASTECTOMY SIMPLE BILATERAL;  Surgeon: Keyon Blanco MD;  Location: [facility]    OPEN REDUCTION INTERNAL FIXATION FOOT Right 3/20/2015    Procedure: OPEN REDUCTION INTERNAL FIXATION FOOT;  Surgeon: Javi Herrmann DPM;  Location: [facility] OR    OPTICAL TRACKING SYSTEM FUSION SPINE POSTERIOR LUMBAR TWO LEVELS N/A 2020    Procedure: LUMBAR 2-SACRAL1 TRANSFORMINAL INTERBODY FUSION;  Surgeon: Lenny Gomes MD;  Location: [facility] OR    REMOVE PORT VASCULAR ACCESS Right 2018    Procedure: REMOVE PORT VASCULAR ACCESS;  right intra jugular port removal;  Surgeon: Keyon Blanco MD;  Location: [facility] OR    SHOULDER SURGERY Right 2015    ZZC LIGATE FALLOPIAN TUBE      ZZC NONSPECIFIC PROCEDURE      ankle fracture surgery       ALLERGIES     Allergies   Allergen Reactions    Oxybutynin      Patient reports symptoms of nausea and mind fogginess       FAMILY HISTORY  Family History   Problem Relation Age of Onset    Hypertension Mother     Thyroid Disease Mother     Cerebrovascular Disease Mother     Hypertension Father     Cerebrovascular Disease Father     Cancer Father         skin    Thyroid Disease Sister     Diabetes Brother         type 2    Depression Sister     Breast Cancer Sister         age 47    Cancer Sister         skin    Cancer Brother         inside nose       SOCIAL HISTORY  Social History     Socioeconomic History    Marital status:      Spouse name: ozzie    Number of children: 5    Years of education: Not on file    Highest education level: Not on file   Occupational History     Employer: [employer]OMEM[employer]ER   Tobacco Use    Smoking status: Former     Current packs/day: 0.00     Average packs/day: 3.0 packs/day for 10.0 years (30.0 ttl pk-yrs)     Types: Cigarettes     Start date: 1969     Quit date: 1979     Years since quittin.5     Passive exposure: Never    Smokeless tobacco: Never     Tobacco comments:     approx. 3 packs daily   Vaping Use    Vaping status: Never Used   Substance and Sexual Activity    Alcohol use: Not Currently     Comment: Stopped drinking a few weeks ago (july 2023)    Drug use: No    Sexual activity: Yes     Partners: Male   Other Topics Concern     Service Not Asked    Blood Transfusions Not Asked    Caffeine Concern No    Occupational Exposure Not Asked    Hobby Hazards No    Sleep Concern No    Stress Concern Yes    Weight Concern Not Asked    Special Diet Not Asked    Back Care Not Asked    Exercise Yes    Bike Helmet Not Asked    Seat Belt Yes    Self-Exams Not Asked    Parent/sibling w/ CABG, MI or angioplasty before 65F 55M? Not Asked   Social History Narrative    Not on file     Social Determinants of Health     Financial Resource Strain: Low Risk  (4/25/2024)    Financial Resource Strain     Within the past 12 months, have you or your family members you live with been unable to get utilities (heat, electricity) when it was really needed?: No   Food Insecurity: Low Risk  (4/25/2024)    Food Insecurity     Within the past 12 months, did you worry that your food would run out before you got money to buy more?: No     Within the past 12 months, did the food you bought just not last and you didn t have money to get more?: No   Transportation Needs: Low Risk  (4/25/2024)    Transportation Needs     Within the past 12 months, has lack of transportation kept you from medical appointments, getting your medicines, non-medical meetings or appointments, work, or from getting things that you need?: No   Physical Activity: Sufficiently Active (4/25/2024)    Exercise Vital Sign     Days of Exercise per Week: 5 days     Minutes of Exercise per Session: 100 min   Stress: Stress Concern Present (4/25/2024)    [employer] - Occupational Stress Questionnaire     Feeling of Stress : To some extent   Social Connections: Unknown (4/25/2024)    Social  "Connection and Isolation Panel [NHANES]     Frequency of Communication with Friends and Family: Not on file     Frequency of Social Gatherings with Friends and Family: Once a week     Attends Anglican Services: Not on file     Active Member of Clubs or Organizations: Not on file     Attends Club or Organization Meetings: Not on file     Marital Status: Not on file   Interpersonal Safety: Low Risk  (4/25/2024)    Interpersonal Safety     Do you feel physically and emotionally safe where you currently live?: Yes     Within the past 12 months, have you been hit, slapped, kicked or otherwise physically hurt by someone?: No     Within the past 12 months, have you been humiliated or emotionally abused in other ways by your partner or ex-partner?: No   Housing Stability: Low Risk  (4/25/2024)    Housing Stability     Do you have housing? : Yes     Are you worried about losing your housing?: No       ROS:   Constitutional: No fever, chills, or sweats. No weight gain/loss   ENT: No visual disturbance, ear ache, epistaxis, sore throat  Allergies/Immunologic: Negative  Respiratory: No cough, hemoptysia  Cardiovascular: As per HPI  GI: No nausea, vomiting, hematemesis, melena, or hematochezia  : No urinary frequency, dysuria, or hematuria  Integument: Negative  Psychiatric: Negative  Neuro: Negative  Endocrinology: Negative   Musculoskeletal: Negative  Vascular: No walking impairment, claudication, ischemic rest pain or nonhealing wounds    EXAM:  /68 (BP Location: Right arm, Patient Position: Sitting, Cuff Size: Adult Regular)   Pulse 73   Resp 18   Ht 1.651 m (5' 5\")   Wt 65.8 kg (145 lb)   SpO2 96%   BMI 24.13 kg/m    In general, the patient is a pleasant female in no apparent distress.    HEENT: NC/AT.  PERRLA.  EOMI.  Sclerae white, not injected.  Nares clear.  Pharynx without erythema or exudate.  Dentition intact.    Neck: No adenopathy.  No thyromegaly. Carotids +2/2 bilaterally without bruits.  No " jugular venous distension.   Heart: RRR. Normal S1, S2 splits physiologically. No murmur, rub, click, or gallop. The PMI is in the 5th ICS in the midclavicular line. There is no heave.    Lungs: CTA.  No ronchi, wheezes, rales.  No dullness to percussion.   Abdomen: Soft, nontender, nondistended. No organomegaly. No AAA.  No bruits.   Extremities: No clubbing, cyanosis, or edema.  No wounds. No varicose veins signs of chronic venous insufficiency.   Vascular: No bruits are noted.    Labs:  LIPID RESULTS:  Lab Results   Component Value Date    CHOL 194 04/29/2024    CHOL 199 10/08/2020    HDL 88 04/29/2024    HDL 76 10/08/2020    LDL 85 04/29/2024    LDL 87 10/08/2020    TRIG 106 04/29/2024    TRIG 180 (H) 10/08/2020    CHOLHDLRATIO 3.0 08/02/2010    NHDL 106 04/29/2024    NHDL 123 10/08/2020       LIVER ENZYME RESULTS:  Lab Results   Component Value Date    AST 24 03/20/2024    AST 20 05/21/2021    ALT 36 04/29/2024    ALT 38 05/21/2021       CBC RESULTS:  Lab Results   Component Value Date    WBC 8.8 05/06/2024    WBC 13.5 (H) 06/24/2021    RBC 3.23 (L) 05/06/2024    RBC 2.99 (L) 06/24/2021    HGB 10.6 (L) 05/06/2024    HGB 9.6 (L) 06/24/2021    HCT 32.9 (L) 05/06/2024    HCT 30.0 (L) 06/24/2021     (H) 05/06/2024     06/24/2021    MCH 32.8 05/06/2024    MCH 32.1 06/24/2021    MCHC 32.2 05/06/2024    MCHC 32.0 06/24/2021    RDW 13.5 05/06/2024    RDW 14.8 06/24/2021     05/06/2024     06/24/2021       BMP RESULTS:  Lab Results   Component Value Date     05/06/2024     06/24/2021    POTASSIUM 3.8 05/06/2024    POTASSIUM 4.8 11/28/2022    POTASSIUM 4.7 06/24/2021    CHLORIDE 98 05/06/2024    CHLORIDE 108 11/28/2022    CHLORIDE 106 06/24/2021    CO2 23 05/06/2024    CO2 26 11/28/2022    CO2 24 06/24/2021    ANIONGAP 14 05/06/2024    ANIONGAP 4 11/28/2022    ANIONGAP 6 06/24/2021     (H) 05/06/2024    GLC 90 11/28/2022     (H) 06/24/2021    BUN 21.0 05/06/2024     BUN 28 11/28/2022    BUN 30 06/24/2021    CR 1.11 (H) 05/06/2024    CR 1.10 (H) 06/24/2021    GFRESTIMATED 52 (L) 05/06/2024    GFRESTIMATED 48 (L) 03/07/2023    GFRESTIMATED 50 (L) 06/24/2021    GFRESTBLACK 58 (L) 06/24/2021    VANDANA 9.9 05/06/2024    VANDANA 9.6 06/24/2021        A1C RESULTS:  Lab Results   Component Value Date    A1C 5.4 07/26/2023    A1C 5.2 10/08/2020         Thank you for allowing me to participate in the care of your patient.      Sincerely,     Keshia Choi MD     [facility]  cc:   Phani Albright PA-C  [address] [place] [id number]

## 2024-05-28 NOTE — NURSING NOTE
Coronary angiogram/PCI/Right Heart Cath prep instructions.     Patient is scheduled for a Coronary Angio w/possible PCI at [facility] - [address] [place] [address] - Main Entrance of the Hospital on TBD.  Check in time is at D and procedure to follow. Staff message sent to [facility] scheduling to reach out to schedule procedure.     Patient instructed to remain NPO (nothing by mouth) for solid foods 8 hours prior to arrival and may have clear liquids up to 2 hours prior to arrival.    Patient Patient does not require extra fluids prior to procedure.    Patient is not diabetic.    Patient is not on anticoagulation.    Patient is not on diuretics.     Continue taking your plavix 75mg daily.     Patient is taking ASA 81mg daily and will take 4 tabs (324mg) the morning of the procedure.    Pt is not on a SGLT2 inhibitor.    Pt is not on a GLP-1 Agonist    Patient advised to take their other daily medications the morning of the procedure with small sips of water.     Verified patient does not have a contrast allergy.    Verified patient has someone available to drive them home from the hospital and can stay with them for 24 hours after the procedure.     Patient advised to notify care team with any new COVID like symptoms prior to procedure. Day of procedure phone number: Pepper at [phone]    Patient is aware of visitor policy.    Patient expresses understanding of above instructions and denies further questions at this time.      Graciela Etienne RN  [facility]

## 2024-05-28 NOTE — PROGRESS NOTES
Michaela Dorman is a 73 year old female who is being evaluated via a billable video visit.      How would you like to obtain your AVS? [email or web address]  If the video visit is dropped, the invitation should be resent by: Text to cell phone: [phone]  Will anyone else be joining your video visit? No    Video Start Time: 1:36 PM    CHIEF COMPLAINT   It was my pleasure to see Michaela Dorman who is a 73 year old female for follow-up of bladder cancer.      HPI   Michaela Dorman is a very pleasant 73 year old female who presents with a history of bladder cancer. She underwent cystectomy with ileal conduit 6/1/2021. Stage [id number] disease.      Had positive margin at urethral margin noted on initial path, but has now had remnant urethra removed and proximal margin resected, with no residual disease.     Overall she is doing well. Did have recent UTI, but that was her first since cystectomy. Also had recent angio for cardiac issues.     She continues to follow with Dr. Sauer of oncology.      Cr 1.11     CT chest/abd/pelvis 5/24/2024  IMPRESSION:  1.  No evidence of recurrent malignancy in the chest, abdomen, or  pelvis.  2.  Bilateral mastectomies.  3.  Cystectomy with ileal conduit urinary diversion. Resolution of  hydronephrosis.  4.  Stable tiny pulmonary nodules measuring up to 3 mm which can be  considered benign given the duration of stability.     Urethrectomy 10/27/2021  Final Diagnosis   A.  Distal urethra, excision:  - Benign urethral and periurethral glands with mild chronic inflammation and fibrosis  - Negative for malignancy    B.  Distal urethra, proximal margins, excision:  - Benign muscle and connective tissue with mild fibrosis  - Negative for malignancy       Cystectomy with ileal conduit 6/1/2021  FINAL DIAGNOSIS:   C. Bladder, anterior vaginal wall, uterus, bilateral fallopian tubes,   ovaries and cervix:   - Invasive high grade urothelial carcinoma   - Carcinoma invades deep muscularis propria   -  Prior therapy related changes   - Urothelial carcinoma in-situ is present in the periurethral ducts at   urethral margin   - Pathologic stage: [id number]   - Benign uterus, cervix, vaginal wall, ovaries and fallopian tubes with   physiological changes   - See synoptic report          Allergies:   Oxybutynin         Review of Systems:  From intake questionnaire     Skin: negative  Eyes: negative  Ears/Nose/Throat: negative  Respiratory: No shortness of breath, dyspnea on exertion, cough, or hemoptysis  Cardiovascular: No chest pain or palpitations  Gastrointestinal: negative; no nausea/vomiting, constipation or diarrhea  Genitourinary: as per HPI  Musculoskeletal: negative  Neurologic: negative  Psychiatric: negative  Hematologic/Lymphatic/Immunologic: negative  Endocrine: negative         Physical Exam:     Vitals:  No vitals were obtained today due to virtual visit.    Physical Exam   EYES: Eyes grossly normal to inspection.  No discharge or erythema, or obvious scleral/conjunctival abnormalities.  SKIN: Visible skin clear. No significant rash, abnormal pigmentation or lesions.  NEURO: Cranial nerves grossly intact.  Mentation and speech appropriate for age.  GENERAL: Healthy, alert and no distress  RESP: No audible wheeze, cough, or visible cyanosis.  No visible retractions or increased work of breathing.    PSYCH: Mentation appears normal, affect normal/bright, judgement and insight intact, normal speech and appearance well-groomed.      Outside and Past Medical records:    Review of the result(s) of each unique test - CT, creat         Assessment and Plan:     73 year old female with stage [id number] bladder cancer s/p radical cystectomy and ileal conduit 6/1/2021. Had remnant urethra removed with no malignancy noted. No recurrence noted today. Also follows with medical oncology.      Plan:  - Follow-up 12 months with Gino Calles PA-C to review CT as ordered per medical oncology team. BMP and cytology as  well.    Orders  Orders Placed This Encounter   Procedures    Basic metabolic panel    Cytology non gyn     Video-Visit Details    Type of service:  Video Visit    Video End Time:1:41 PM    Originating Location (pt. Location): Home    Distant Location (provider location):  On-site    Platform used for Video Visit: [employer]    10 minutes spent on the date of the encounter including direct interaction with the patient, performing chart review, history and exam, documentation and further activities as noted above.    Leonardo Robles MD  Urology  [facility]

## 2024-05-28 NOTE — Clinical Note
"5/28/2024       RE: Michaela Dorman  [address]  [facility] [address]     Dear Colleague,    Thank you for referring your patient, Michaela Dorman, to the [facility] [place] [facility]. Please see a copy of my visit note below.    Virtual Visit Details    Type of service:  Video Visit   Video Start Time: {video visit start/end time for provider to select:663374}  Video End Time:{video visit start/end time for provider to select:247232}    Originating Location (pt. Location): {video visit patient location:032608::\"Home\"}  {PROVIDER LOCATION On-site should be selected for visits conducted from your clinic location or adjoining [facility] hospital, academic office, or other nearby [facility] building. Off-site should be selected for all other provider locations, including home:176102}  Distant Location (provider location):  {virtual location provider:559512}  Platform used for Video Visit: {Virtual Visit Platforms:563892::\"Pump!\"}    Michaela Dorman is a 73 year old female who is being evaluated via a billable video visit.      How would you like to obtain your AVS? MyChart  If the video visit is dropped, the invitation should be resent by: Text to cell phone: [phone]  Will anyone else be joining your video visit? No    Video Start Time: 1:36 PM    CHIEF COMPLAINT   It was my pleasure to see Michaela Dorman who is a 73 year old female for follow-up of bladder cancer.      HPI   Michaela Dorman is a very pleasant 73 year old female who presents with a history of bladder cancer. She underwent cystectomy with ileal conduit 6/1/2021. Stage [id number] disease.      Had positive margin at urethral margin noted on initial path, but has now had remnant urethra removed and proximal margin resected, with no residual disease.     Overall she is doing well. Did have recent UTI, but that was her first since cystectomy. Also had recent angio for cardiac " issues.     She continues to follow with Dr. Sauer of oncology.      Cr 1.11     CT chest/abd/pelvis 5/24/2024  IMPRESSION:  1.  No evidence of recurrent malignancy in the chest, abdomen, or  pelvis.  2.  Bilateral mastectomies.  3.  Cystectomy with ileal conduit urinary diversion. Resolution of  hydronephrosis.  4.  Stable tiny pulmonary nodules measuring up to 3 mm which can be  considered benign given the duration of stability.     Urethrectomy 10/27/2021  Final Diagnosis   A.  Distal urethra, excision:  - Benign urethral and periurethral glands with mild chronic inflammation and fibrosis  - Negative for malignancy    B.  Distal urethra, proximal margins, excision:  - Benign muscle and connective tissue with mild fibrosis  - Negative for malignancy       Cystectomy with ileal conduit 6/1/2021  FINAL DIAGNOSIS:   C. 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 in the periurethral ducts at   urethral margin   - Pathologic stage: [id number]   - Benign uterus, cervix, vaginal wall, ovaries and fallopian tubes with   physiological changes   - See synoptic report          Allergies:   Oxybutynin         Review of Systems:  From intake questionnaire     Skin: negative  Eyes: negative  Ears/Nose/Throat: negative  Respiratory: No shortness of breath, dyspnea on exertion, cough, or hemoptysis  Cardiovascular: No chest pain or palpitations  Gastrointestinal: negative; no nausea/vomiting, constipation or diarrhea  Genitourinary: as per HPI  Musculoskeletal: negative  Neurologic: negative  Psychiatric: negative  Hematologic/Lymphatic/Immunologic: negative  Endocrine: negative         Physical Exam:     Vitals:  No vitals were obtained today due to virtual visit.    Physical Exam   EYES: Eyes grossly normal to  inspection.  No discharge or erythema, or obvious scleral/conjunctival abnormalities.  SKIN: Visible skin clear. No significant rash, abnormal pigmentation or lesions.  NEURO: Cranial nerves grossly intact.  Mentation and speech appropriate for age.  GENERAL: Healthy, alert and no distress  RESP: No audible wheeze, cough, or visible cyanosis.  No visible retractions or increased work of breathing.    PSYCH: Mentation appears normal, affect normal/bright, judgement and insight intact, normal speech and appearance well-groomed.      Outside and Past Medical records:    Review of the result(s) of each unique test - CT, creat         Assessment and Plan:     73 year old female with stage ypT2N0 bladder cancer s/p radical cystectomy and ileal conduit 6/1/2021. Had remnant urethra removed with no malignancy noted. No recurrence noted today. Also follows with medical oncology.      Plan:  - Follow-up 12 months with Gino Calles PA-C to review CT as ordered per medical oncology team. BMP and cytology as well.    Orders  Orders Placed This Encounter   Procedures     Basic metabolic panel     Cytology non gyn     Video-Visit Details    Type of service:  Video Visit    Video End Time:1:41 PM    Originating Location (pt. Location): Home    Distant Location (provider location):  On-site    Platform used for Video Visit: AmOwned it    10 minutes spent on the date of the encounter including direct interaction with the patient, performing chart review, history and exam, documentation and further activities as noted above.    Leonardo Robles MD  Urology  [facility]        Again, thank you for allowing me to participate in the care of your patient.      Sincerely,    Leonardo Robles MD

## 2024-05-28 NOTE — PATIENT INSTRUCTIONS
1. Echocardiogram   2. Coronary angiogram with possible complex intervention with Dr. Stevens (or Leonidas - first available)   3. Follow up first available CORI in 1 month         Coronary angiogram/PCI/Right Heart Cath prep instructions.     Patient is scheduled for a Coronary Angio w/possible PCI at [facility] - [address], [place] [address] - Main Entrance of the Hospital on TBD.  Check in time is at D and procedure to follow. Staff message sent to [facility] scheduling to reach out to schedule procedure.     Patient instructed to remain NPO (nothing by mouth) for solid foods 8 hours prior to arrival and may have clear liquids up to 2 hours prior to arrival.    Patient Patient does not require extra fluids prior to procedure.    Patient is not diabetic.    Patient is not on anticoagulation.    Patient is not on diuretics.     Continue taking your plavix 75mg daily.     Patient is taking ASA 81mg daily and will take 4 tabs (324mg) the morning of the procedure.    Pt is not on a SGLT2 inhibitor.    Pt is not on a GLP-1 Agonist    Patient advised to take their other daily medications the morning of the procedure with small sips of water.     Verified patient does not have a contrast allergy.    Verified patient has someone available to drive them home from the hospital and can stay with them for 24 hours after the procedure.     Patient advised to notify care team with any new COVID like symptoms prior to procedure. Day of procedure phone number: Pepper at [phone]    Patient is aware of visitor policy.    Patient expresses understanding of above instructions and denies further questions at this time.      Graciela Etienne RN  [facility]

## 2024-05-28 NOTE — NURSING NOTE
Is the patient currently in the state of [place]? YES    Visit mode:VIDEO    If the visit is dropped, the patient can be reconnected by: VIDEO VISIT: Text to cell phone:   Telephone Information:   Mobile [phone]       Will anyone else be joining the visit? NO  (If patient encounters technical issues they should call [phone])    How would you like to obtain your AVS? MyChart    Are changes needed to the allergy or medication list? No    Are refills needed on medications prescribed by this physician? NO    Reason for visit: RECHECK    Natasha OATES

## 2024-05-28 NOTE — PROGRESS NOTES
Vascular Cardiology Consultation      HPI:     This is a 73 yr old female with PMH significant for CAD s/p PCI recently end of April.     Cath report reviewed - 99% stenosis of OM1, treated with HD IVUS guided PCI and placement of overlapping Synergy AMBREEN (2.5 x 20 mm, 2.5 x 16 mm), postdilated up to 2.75 mm proximally.. Elsewhere, there is a 60% stenosis of the proximal LCx at the bifurcation with OM1, with a minimal luminal area on IVUS of about 4.2 mm . This is severely calcified and PCI would require calcium modification (at least shockwave, possibly atherectomy).   3. The mid LAD is severely calcified with a roughly 50% stenosis. Given that the OM1 lesion was the clear culprit for the patient's symptoms, and PCI of either the LCx/OM1 bifurcation or the mid LAD would require extensive calcium modification, would recommend medical management of her other coronary disease for now. If refractory angina persists despite OM1 PCI and optimal medical therapy, could return to the Cath Lab for IFR/FFR of the proximal LCx and mid LAD lesions, with complex PCI as appropriate.     She is here for urgent visit for acute onset of chest pain and pressure over the weekend. She developed shortness of breath in the shower and then Sunday morning chest pressure woke her up from sleep. Took a SL nitroglycerin which helped improved her pain. She had EKG today showing some changes in V3 from prior, axis deviation. She has been compliant with her DAPT. BP well controlled today.    ASSESSMENT/PLAN:    1. Chest pressure: will evaluate stent placement and also consider IFR/FFR of prox LCX/mid LAD and complex PCI  -coronary angiogram at [facility]. Risks and benefits discussed and patient wishes to proceed  -echocardiogram  -one month CORI     eKshia Choi MD MSC          PAST MEDICAL HISTORY  Past Medical History:   Diagnosis Date    Acute kidney injury (H24)     Carotid stenosis, bilateral L80%, R40% 09/02/2020    Central  "stenosis of spinal canal 12/01/2017    lumbar     DDD (degenerative disc disease), lumbar 12/01/2017    Depressive disorder, not elsewhere classified     Esophageal reflux     ETOH abuse     in remission    Foraminal stenosis of lumbar region 12/01/2017    Complete lumbar spine left at various degrees and to a lesser extent the right as well.    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    Subdural hematoma (H)     Thyroid disease     Urothelial carcinoma (H)        CURRENT MEDICATIONS  Current Outpatient Medications   Medication Sig Dispense Refill    acetaminophen (TYLENOL) 500 MG tablet Take 1,000 mg by mouth 3 times daily as needed for mild pain (500MG X 2 = 1,000MG)      aspirin (ASA) 81 MG chewable tablet Take 1 tablet (81 mg) by mouth daily 100 tablet 3    atorvastatin (LIPITOR) 40 MG tablet Take 2 tablets (80 mg) by mouth daily 180 tablet 3    carvedilol (COREG) 6.25 MG tablet TAKE 1 TABLET (6.25 MG) BY MOUTH 2 TIMES DAILY (WITH MEALS) PLEASE SEND NEXT REFILL TO PRIMARY CARE AS NEPHROLOGY FOLLOW UP \"AS NEEDED\" 90 tablet 0    clopidogrel (PLAVIX) 75 MG tablet Take 1 tablet (75 mg) by mouth daily 90 tablet 3    Cyanocobalamin (B-12 PO) Take 1 tablet by mouth daily      escitalopram (LEXAPRO) 10 MG tablet Take 1 tablet (10 mg) by mouth daily 90 tablet 3    ezetimibe (ZETIA) 10 MG tablet Take 1 tablet (10 mg) by mouth daily 90 tablet 3    gabapentin (NEURONTIN) 300 MG capsule Take 2 capsules (600 mg) by mouth 3 times daily 180 capsule 5    levothyroxine (SYNTHROID/LEVOTHROID) 25 MCG tablet TAKE 1 TABLET BY MOUTH EVERY DAY (Patient taking differently: Take 25 mcg by mouth daily) 90 tablet 1    LORazepam (ATIVAN) 0.5 MG tablet TAKE 1 TABLET (0.5 MG) BY MOUTH EVERY 4 HOURS AS NEEDED FOR ANXIETY, NAUSEA OR SLEEP " (Patient taking differently: Take 0.5 mg by mouth every 4 hours as needed for anxiety, nausea or sleep) 25 tablet 0    meclizine (ANTIVERT) 25 MG tablet Take 12.5 mg by mouth every morning 1/2 tablet (to mitigate nausea from morning meds)      nitroGLYcerin (NITROSTAT) 0.4 MG sublingual tablet For chest pain place 1 tablet under the tongue every 5 minutes for 3 doses. If symptoms persist 5 minutes after 2nd dose call 911. 30 tablet 0    oxyCODONE (ROXICODONE) 5 MG tablet TAKE 1 TABLET (5 MG) BY MOUTH EVERY 6 HOURS AS NEEDED FOR MODERATE TO SEVERE PAIN 30 tablet 0    pantoprazole (PROTONIX) 40 MG EC tablet Take 1 tablet (40 mg) by mouth daily 90 tablet 3    sucralfate (CARAFATE) 1 GM tablet Take 1 g by mouth 2 times daily as needed for nausea (usually takes before supper)      nitroFURantoin macrocrystal-monohydrate (MACROBID) 100 MG capsule Take 1 capsule (100 mg) by mouth 2 times daily (Patient not taking: Reported on 5/28/2024) 14 capsule 0       PAST SURGICAL HISTORY:  Past Surgical History:   Procedure Laterality Date    ARTHROPLASTY SHOULDER Right 11/17/2015    ARTHROSCOPY KNEE  6/7/2012    Procedure:ARTHROSCOPY KNEE; right knee arthroscopy with partial lateral menisectomy; Surgeon:DAMIÁN MCALLISTER; Location:[facility] OR    BIOPSY VAGINAL N/A 10/27/2021    Procedure: DISTAL URETHRECTOMY;  Surgeon: Leonardo Robles MD;  Location: [facility] OR    COLONOSCOPY N/A 12/22/2017    Procedure: COLONOSCOPY;  colonoscopy;  Surgeon: Chuck Chand MD;  Location: [facility] GI    CV CORONARY ANGIOGRAM N/A 4/23/2024    Procedure: Coronary Angiogram;  Surgeon: Leonardo Marinelli MD;  Location:  HEART CARDIAC CATH LAB    CV INTRAVASULAR ULTRASOUND N/A 4/23/2024    Procedure: Intravascular Ultrasound;  Surgeon: Leonardo Marinelli MD;  Location:  HEART CARDIAC CATH LAB    CV LEFT HEART CATH N/A 4/23/2024    Procedure: Left Heart Catheterization;  Surgeon: Leonardo Marinelli MD;  Location:  HEART CARDIAC CATH LAB     CV PCI STENT DRUG ELUTING N/A 4/23/2024    Procedure: Percutaneous Coronary Intervention Stent;  Surgeon: Leonardo Marinelli MD;  Location:  HEART CARDIAC CATH LAB    CYSTECTOMY BLADDER RADICAL, ILEAL DIVERSION, COMBINED N/A 6/1/2021    Procedure: RADICAL CYSTECTOMY  WITH ILEAL CONDUIT CREATION,BILATERAL PELVIC LYMPHADENECTOMY;  Surgeon: Leonardo Robles MD;  Location: [facility] OR    CYSTOSCOPY, RETROGRADES, COMBINED Bilateral 3/18/2021    Procedure: CYSTOSCOPY, WITH RETROGRADE PYELOGRAM;  Surgeon: Girish Jack MD;  Location: [facility] OR    CYSTOSCOPY, TRANSURETHRAL RESECTION (TUR) TUMOR BLADDER, COMBINED N/A 3/18/2021    Procedure: CYSTOSCOPY, WITH TRANSURETHRAL RESECTION BLADDER TUMOR;  Surgeon: Girish Jack MD;  Location: [facility] OR    ENDARTERECTOMY CAROTID Left 10/8/2020    Procedure: LEFT CAROTID ENDARTERECTOMY WITH EEG;  Surgeon: Lacho Jasmine MD;  Location:  OR    ESOPHAGOSCOPY, GASTROSCOPY, DUODENOSCOPY (EGD), COMBINED N/A 6/20/2022    Procedure: ESOPHAGOGASTRODUODENOSCOPY, WITH BIOPSY;  Surgeon: Ramses Arenas MD;  Location:  GI    EXCISE MASS AXILLA Left 9/16/2016    Procedure: EXCISE MASS AXILLA;  Surgeon: Keyon Blanco MD;  Location: [facility] OR    HC REMV CATARACT EXTRACAP,INSERT LENS, W/O ECP  6/25/2009    Right eye    INJECT EPIDURAL LUMBAR N/A 4/11/2019    Procedure: interlaminar epidual steroid injection lumbar 4-5;  Surgeon: Oskar Salvador MD;  Location: [facility]    INJECT EPIDURAL LUMBAR Bilateral 9/12/2019    Procedure: lumbar 4-5 epidural interlaminar steroid injection;  Surgeon: Oskar Salvador MD;  Location: [facility]    INSERT PORT VASCULAR ACCESS Right 10/7/2016    Procedure: INSERT PORT VASCULAR ACCESS;  Surgeon: Keyon Blanco MD;  Location: [facility] OR    IR CHEST PORT PLACEMENT > 5 YRS OF AGE  4/16/2021    MASTECTOMY SIMPLE BILATERAL Bilateral 2/8/2017    Procedure: MASTECTOMY SIMPLE BILATERAL;  Surgeon: Keyon Blanco MD;  Location: [facility] OR    OPEN REDUCTION INTERNAL  FIXATION FOOT Right 3/20/2015    Procedure: OPEN REDUCTION INTERNAL FIXATION FOOT;  Surgeon: Javi Herrmann DPM;  Location: [facility] OR    OPTICAL TRACKING SYSTEM FUSION SPINE POSTERIOR LUMBAR TWO LEVELS N/A 2020    Procedure: LUMBAR 2-SACRAL1 TRANSFORMINAL INTERBODY FUSION;  Surgeon: Lenny Gomes MD;  Location: [facility] OR    REMOVE PORT VASCULAR ACCESS Right 2018    Procedure: REMOVE PORT VASCULAR ACCESS;  right intra jugular port removal;  Surgeon: Keyon Blanco MD;  Location: [facility] OR    SHOULDER SURGERY Right 2015    ZZC LIGATE FALLOPIAN TUBE      ZZC NONSPECIFIC PROCEDURE      ankle fracture surgery       ALLERGIES     Allergies   Allergen Reactions    Oxybutynin      Patient reports symptoms of nausea and mind fogginess       FAMILY HISTORY  Family History   Problem Relation Age of Onset    Hypertension Mother     Thyroid Disease Mother     Cerebrovascular Disease Mother     Hypertension Father     Cerebrovascular Disease Father     Cancer Father         skin    Thyroid Disease Sister     Diabetes Brother         type 2    Depression Sister     Breast Cancer Sister         age 47    Cancer Sister         skin    Cancer Brother         inside nose       SOCIAL HISTORY  Social History     Socioeconomic History    Marital status:      Spouse name: ozzie    Number of children: 5    Years of education: Not on file    Highest education level: Not on file   Occupational History     Employer: [employer]   Tobacco Use    Smoking status: Former     Current packs/day: 0.00     Average packs/day: 3.0 packs/day for 10.0 years (30.0 ttl pk-yrs)     Types: Cigarettes     Start date: 1969     Quit date: 1979     Years since quittin.5     Passive exposure: Never    Smokeless tobacco: Never    Tobacco comments:     approx. 3 packs daily   Vaping Use    Vaping status: Never Used   Substance and Sexual Activity    Alcohol use: Not Currently     Comment: Stopped drinking a few weeks ago (july  2023)    Drug use: No    Sexual activity: Yes     Partners: Male   Other Topics Concern     Service Not Asked    Blood Transfusions Not Asked    Caffeine Concern No    Occupational Exposure Not Asked    Hobby Hazards No    Sleep Concern No    Stress Concern Yes    Weight Concern Not Asked    Special Diet Not Asked    Back Care Not Asked    Exercise Yes    Bike Helmet Not Asked    Seat Belt Yes    Self-Exams Not Asked    Parent/sibling w/ CABG, MI or angioplasty before 65F 55M? Not Asked   Social History Narrative    Not on file     Social Determinants of Health     Financial Resource Strain: Low Risk  (4/25/2024)    Financial Resource Strain     Within the past 12 months, have you or your family members you live with been unable to get utilities (heat, electricity) when it was really needed?: No   Food Insecurity: Low Risk  (4/25/2024)    Food Insecurity     Within the past 12 months, did you worry that your food would run out before you got money to buy more?: No     Within the past 12 months, did the food you bought just not last and you didn t have money to get more?: No   Transportation Needs: Low Risk  (4/25/2024)    Transportation Needs     Within the past 12 months, has lack of transportation kept you from medical appointments, getting your medicines, non-medical meetings or appointments, work, or from getting things that you need?: No   Physical Activity: Sufficiently Active (4/25/2024)    Exercise Vital Sign     Days of Exercise per Week: 5 days     Minutes of Exercise per Session: 100 min   Stress: Stress Concern Present (4/25/2024)    [employer] - Occupational Stress Questionnaire     Feeling of Stress : To some extent   Social Connections: Unknown (4/25/2024)    Social Connection and Isolation Panel [NHANES]     Frequency of Communication with Friends and Family: Not on file     Frequency of Social Gatherings with Friends and Family: Once a week     Attends Orthodox  "Services: Not on file     Active Member of Clubs or Organizations: Not on file     Attends Club or Organization Meetings: Not on file     Marital Status: Not on file   Interpersonal Safety: Low Risk  (4/25/2024)    Interpersonal Safety     Do you feel physically and emotionally safe where you currently live?: Yes     Within the past 12 months, have you been hit, slapped, kicked or otherwise physically hurt by someone?: No     Within the past 12 months, have you been humiliated or emotionally abused in other ways by your partner or ex-partner?: No   Housing Stability: Low Risk  (4/25/2024)    Housing Stability     Do you have housing? : Yes     Are you worried about losing your housing?: No       ROS:   Constitutional: No fever, chills, or sweats. No weight gain/loss   ENT: No visual disturbance, ear ache, epistaxis, sore throat  Allergies/Immunologic: Negative  Respiratory: No cough, hemoptysia  Cardiovascular: As per HPI  GI: No nausea, vomiting, hematemesis, melena, or hematochezia  : No urinary frequency, dysuria, or hematuria  Integument: Negative  Psychiatric: Negative  Neuro: Negative  Endocrinology: Negative   Musculoskeletal: Negative  Vascular: No walking impairment, claudication, ischemic rest pain or nonhealing wounds    EXAM:  /68 (BP Location: Right arm, Patient Position: Sitting, Cuff Size: Adult Regular)   Pulse 73   Resp 18   Ht 1.651 m (5' 5\")   Wt 65.8 kg (145 lb)   SpO2 96%   BMI 24.13 kg/m    In general, the patient is a pleasant female in no apparent distress.    HEENT: NC/AT.  PERRLA.  EOMI.  Sclerae white, not injected.  Nares clear.  Pharynx without erythema or exudate.  Dentition intact.    Neck: No adenopathy.  No thyromegaly. Carotids +2/2 bilaterally without bruits.  No jugular venous distension.   Heart: RRR. Normal S1, S2 splits physiologically. No murmur, rub, click, or gallop. The PMI is in the 5th ICS in the midclavicular line. There is no heave.    Lungs: CTA.  No " ronchi, wheezes, rales.  No dullness to percussion.   Abdomen: Soft, nontender, nondistended. No organomegaly. No AAA.  No bruits.   Extremities: No clubbing, cyanosis, or edema.  No wounds. No varicose veins signs of chronic venous insufficiency.   Vascular: No bruits are noted.    Labs:  LIPID RESULTS:  Lab Results   Component Value Date    CHOL 194 04/29/2024    CHOL 199 10/08/2020    HDL 88 04/29/2024    HDL 76 10/08/2020    LDL 85 04/29/2024    LDL 87 10/08/2020    TRIG 106 04/29/2024    TRIG 180 (H) 10/08/2020    CHOLHDLRATIO 3.0 08/02/2010    NHDL 106 04/29/2024    NHDL 123 10/08/2020       LIVER ENZYME RESULTS:  Lab Results   Component Value Date    AST 24 03/20/2024    AST 20 05/21/2021    ALT 36 04/29/2024    ALT 38 05/21/2021       CBC RESULTS:  Lab Results   Component Value Date    WBC 8.8 05/06/2024    WBC 13.5 (H) 06/24/2021    RBC 3.23 (L) 05/06/2024    RBC 2.99 (L) 06/24/2021    HGB 10.6 (L) 05/06/2024    HGB 9.6 (L) 06/24/2021    HCT 32.9 (L) 05/06/2024    HCT 30.0 (L) 06/24/2021     (H) 05/06/2024     06/24/2021    MCH 32.8 05/06/2024    MCH 32.1 06/24/2021    MCHC 32.2 05/06/2024    MCHC 32.0 06/24/2021    RDW 13.5 05/06/2024    RDW 14.8 06/24/2021     05/06/2024     06/24/2021       BMP RESULTS:  Lab Results   Component Value Date     05/06/2024     06/24/2021    POTASSIUM 3.8 05/06/2024    POTASSIUM 4.8 11/28/2022    POTASSIUM 4.7 06/24/2021    CHLORIDE 98 05/06/2024    CHLORIDE 108 11/28/2022    CHLORIDE 106 06/24/2021    CO2 23 05/06/2024    CO2 26 11/28/2022    CO2 24 06/24/2021    ANIONGAP 14 05/06/2024    ANIONGAP 4 11/28/2022    ANIONGAP 6 06/24/2021     (H) 05/06/2024    GLC 90 11/28/2022     (H) 06/24/2021    BUN 21.0 05/06/2024    BUN 28 11/28/2022    BUN 30 06/24/2021    CR 1.11 (H) 05/06/2024    CR 1.10 (H) 06/24/2021    GFRESTIMATED 52 (L) 05/06/2024    GFRESTIMATED 48 (L) 03/07/2023    GFRESTIMATED 50 (L) 06/24/2021     GFRESTBLACK 58 (L) 06/24/2021    VANDANA 9.9 05/06/2024    VANDANA 9.6 06/24/2021        A1C RESULTS:  Lab Results   Component Value Date    A1C 5.4 07/26/2023    A1C 5.2 10/08/2020

## 2024-05-29 ENCOUNTER — HOSPITAL ENCOUNTER (OUTPATIENT)
Facility: CLINIC | Age: 74
End: 2024-05-29
Attending: INTERNAL MEDICINE | Admitting: INTERNAL MEDICINE
Payer: MEDICARE

## 2024-05-29 ENCOUNTER — MYC MEDICAL ADVICE (OUTPATIENT)
Dept: CARDIOLOGY | Facility: CLINIC | Age: 74
End: 2024-05-29

## 2024-05-29 DIAGNOSIS — I20.0 UNSTABLE ANGINA (H): ICD-10-CM

## 2024-05-31 DIAGNOSIS — F43.21 ADJUSTMENT DISORDER WITH DEPRESSED MOOD: ICD-10-CM

## 2024-05-31 NOTE — TELEPHONE ENCOUNTER
Requested Prescriptions   Pending Prescriptions Disp Refills    LORazepam (ATIVAN) 0.5 MG tablet [Pharmacy Med Name: LORAZEPAM 0.5MG TABLET] 25 tablet 0     Sig: TAKE 1 TABLET (0.5 MG) BY MOUTH EVERY 4 HOURS AS NEEDED FOR ANXIETY, NAUSEA OR SLEEP       There is no refill protocol information for this order          Unable to fill per RN protocol, will forward to provider for review.         Carol Rollins RN on 5/31/2024 at 10:46 AM

## 2024-06-03 RX ORDER — LORAZEPAM 0.5 MG/1
0.5 TABLET ORAL EVERY 4 HOURS PRN
Qty: 20 TABLET | Refills: 0 | Status: SHIPPED | OUTPATIENT
Start: 2024-06-03 | End: 2024-07-03

## 2024-06-07 DIAGNOSIS — E03.4 HYPOTHYROIDISM DUE TO ACQUIRED ATROPHY OF THYROID: ICD-10-CM

## 2024-06-07 RX ORDER — LEVOTHYROXINE SODIUM 25 UG/1
25 TABLET ORAL DAILY
Qty: 90 TABLET | Refills: 1 | Status: SHIPPED | OUTPATIENT
Start: 2024-06-07 | End: 2024-09-26

## 2024-06-08 NOTE — PATIENT INSTRUCTIONS
Do not take atorvastatin while taking Paxlovid; can resume atorvastatin after completing 5 days of Paxlovid treatment. Reduce dose of oxycodone (Percocet) to half of normal dose while taking Paxlovid; can resume normal dosing after completing 5 days of Paxlovid treatment.    For informational purposes only. Not to replace the advice of your health care provider. Copyright   2022 [facility]. All rights reserved. Clinically reviewed by Carina Kenny, PharmD, BCACP. [place] [id number] - REV 06/23.  COVID-19 Outpatient Treatments  Your care team can help you find the best treatments for COVID-19. Talk to a health care provider or refer to the FDA medicine fact sheets below.  Paxlovid (nirmatrelvir and ritonavir): https://www.paxlovid.EvalYou/resources  Molnupiravir (Lagevrio): https://www.fda.gov/media/702241/download  Important: We can only prescribe Paxlovid or Molnupiravir when it can be started within 5 days of first having symptoms.  Paxlovid (nimatrelvir and ritonavir)  How it works  Two medicines (nirmatrelvir and ritonavir) are taken together. They stop the virus from growing. Less amount of virus is easier for your body to fight.  Benefits  Lowers risk of a hospital stay or death from COVID-19.  How to take  Medicine comes in a daily container with both medicine tablets. Take by mouth twice daily (once in the morning, once at night) for 5 days.  The number of tablets to take varies by patient.  Don't chew or break capsules. Swallow whole.  When to take  Take it as soon as possible and within 5 days of your first symptoms.  Who can take it  Patients must be 12 years or older weigh at least 88 pounds. Paxlovid is the preferred treatment for pregnant patients.  Possible side effects  Can cause altered sense of taste, diarrhea (loose, watery stools), high blood pressure, muscle aches.  Medicine conflicts  Some medicines may conflict with Paxlovid and may cause serious side effects.  Tell your care team  about all the medicines you take, including prescription and over-the-counter medicines, vitamins, and herbal supplements.  Your care team will review your medicines to make sure that you can safely take Paxlovid.  Cautions  Paxlovid is not advised for patients with severe kidney or liver disease. If you have kidney or liver problems, the dose may need to be changed.  If you're pregnant or breastfeeding, talk to your care team about your options.  If you take hormonal birth control (such as the Pill), then you or your partner should also use a non-hormonal form of birth control (such as a condom). Keep doing this for 1 menstrual cycle after your last dose of Paxlovid.  Molnupiravir (lagevrio)  How it works  Stops the virus from growing. Less amount of virus is easier for your body to fight.  Benefits  Lowers risk of a hospital stay or death from COVID-19.  How to take  Take 4 capsules by mouth every 12 hours (4 in the morning and 4 at night) for 5 days. Don't chew or break capsules. Swallow whole.  When to take  Take as soon as possible and within 5 days of your first symptoms.  Who can take it  Patients must be 18 years or older.   Possible side effects  Diarrhea (loose, watery stools), nausea (feeling sick to your stomach), dizziness, headaches.  Medicine conflicts  Right now, there are no known conflicts with other drugs. But tell your care team about all medicines you take.  Cautions  This medicine is not advised for patients who are pregnant.  If you are someone who could become pregnant, use trusted birth control until 4 days after your last dose of molnupiravir.  If your partner could become pregnant, you should use trusted birth control until 3 months after your last dose of molnupiravir.      Coping with Life After COVID-19  Being in the hospital because of COVID-19 is scary. Going home can be scary, too. You may face changes to your life, the way you work or what you can eat. It s hard to adjust to change,  and it s normal to feel afraid, frustrated or even angry. These feelings usually go away over time. If your feelings don t start to get better, it s called  adjustment disorder.      What signs should I look out for?  Adjustment disorder can happen to anyone in a time of stress. It makes it hard to cope with daily life. You may feel lonely or fight with loved ones, even if you re glad to be home. Watch for these signs:  Fear or worry  Hard time focusing  Sadness or anger  Trouble talking to family or friends  Feeling like you don t fit in or isolating yourself  Problems with sleep   Drinking alcohol or taking drugs to cope    What can I do?  You can help yourself get better. Feeling you have control helps you move forward. You may wonder if you ll be able to do things you did before. Be patient. Do your best to make the most of every day. Try to build relationships, be as active as you can, eat right and keep a sense of humor. Avoid smoking and drinking too much alcohol. Call your family doctor or clinic if you re not sure what to do. They can guide you to care or other services.    When should I get help?  Think about getting counseling if your sadness or frustration gets worse. Together with a trained counselor, you can talk about your problems adjusting to life after your hospital stay. You can come up with new ways to handle changes that give you more control. Your family doctor or care team can help you find a counselor.     Get help if you re thinking about hurting yourself. If you need help right away, call 911 or go to the nearest emergency room. You can also try the Crisis Text Line.    Crisis Text Line: 827-051 (http://www.crisistextline.org)  The Crisis Text Line serves anyone, in any crisis. It gives free, 24/7 support. Here's how it works:  Text HOME to 149577 from anywhere in the [place], anytime, about any type of crisis.  A live, trained Crisis Counselor will text you back quickly.  The volunteer Crisis  Counselor can help you move from a  hot moment  to a  cool moment.  They can also help you work out a safety plan.   Gen: NAD, AOx3, able to make needs known, non-toxic  Head: NCAT  HEENT: EOMI, oral mucosa moist, normal conjunctiva  Lung: no respiratory distress, CTAB, no wheezes/rhonchi/rales B/L, speaking in full sentences  CV: RRR, no murmurs  Abd: non distended, soft, nontender, no guarding, no CVA tenderness  MSK: no visible deformities  Neuro: Appears non focal  Skin: Warm, well perfused. RUE PICC Line in place, no active bleeding, blood noticed on underlying bandages  Psych: normal affect

## 2024-06-11 ENCOUNTER — MYC MEDICAL ADVICE (OUTPATIENT)
Dept: CARDIOLOGY | Facility: CLINIC | Age: 74
End: 2024-06-11
Payer: COMMERCIAL

## 2024-06-11 NOTE — TELEPHONE ENCOUNTER
Called patient. Patient said she feels great this morning, no symptoms including chest tightness. The chest tightness last night was about a 2 out of 10 so very mild. Went through when to seek emergency medical attention. Instructed patient to not to wait until Thursday for the scheduled angiogram if her chest tightness comes back or is worsened, and or is not relieved by nitroglycerin, or develops other symptoms along with the chest tightness. Instructed patient to not due any strenuous activity prior to scheduled angiogram and to call or mychart again if she has any further questions or concerns. Patient verbalized understanding.

## 2024-06-12 ENCOUNTER — HOSPITAL ENCOUNTER (INPATIENT)
Facility: CLINIC | Age: 74
LOS: 3 days | Discharge: HOME | DRG: 281 | End: 2024-06-15
Attending: EMERGENCY MEDICINE | Admitting: INTERNAL MEDICINE
Payer: MEDICARE

## 2024-06-12 ENCOUNTER — APPOINTMENT (OUTPATIENT)
Dept: GENERAL RADIOLOGY | Facility: CLINIC | Age: 74
DRG: 281 | End: 2024-06-12
Attending: EMERGENCY MEDICINE
Payer: MEDICARE

## 2024-06-12 ENCOUNTER — MYC MEDICAL ADVICE (OUTPATIENT)
Dept: CARDIOLOGY | Facility: CLINIC | Age: 74
End: 2024-06-12
Payer: COMMERCIAL

## 2024-06-12 DIAGNOSIS — I10 HYPERTENSION GOAL BP (BLOOD PRESSURE) < 140/90: Primary | ICD-10-CM

## 2024-06-12 DIAGNOSIS — I25.10 CORONARY ARTERY DISEASE INVOLVING NATIVE CORONARY ARTERY OF NATIVE HEART WITHOUT ANGINA PECTORIS: ICD-10-CM

## 2024-06-12 DIAGNOSIS — I20.0 UNSTABLE ANGINA (H): ICD-10-CM

## 2024-06-12 DIAGNOSIS — I20.0 UNSTABLE ANGINA (H): Primary | ICD-10-CM

## 2024-06-12 LAB
ANION GAP SERPL CALCULATED.3IONS-SCNC: 15 MMOL/L (ref 7–15)
APTT PPP: 27 SECONDS (ref 22–38)
ATRIAL RATE - MUSE: 71 BPM
BASOPHILS # BLD AUTO: 0.1 10E3/UL (ref 0–0.2)
BASOPHILS NFR BLD AUTO: 1 %
BUN SERPL-MCNC: 24.2 MG/DL (ref 8–23)
CALCIUM SERPL-MCNC: 10.2 MG/DL (ref 8.8–10.2)
CHLORIDE SERPL-SCNC: 101 MMOL/L (ref 98–107)
CREAT SERPL-MCNC: 1.07 MG/DL (ref 0.51–0.95)
DEPRECATED HCO3 PLAS-SCNC: 21 MMOL/L (ref 22–29)
DIASTOLIC BLOOD PRESSURE - MUSE: NORMAL MMHG
EGFRCR SERPLBLD CKD-EPI 2021: 54 ML/MIN/1.73M2
EOSINOPHIL # BLD AUTO: 0.3 10E3/UL (ref 0–0.7)
EOSINOPHIL NFR BLD AUTO: 4 %
ERYTHROCYTE [DISTWIDTH] IN BLOOD BY AUTOMATED COUNT: 14.6 % (ref 10–15)
GLUCOSE SERPL-MCNC: 89 MG/DL (ref 70–99)
HCT VFR BLD AUTO: 33.7 % (ref 35–47)
HGB BLD-MCNC: 11 G/DL (ref 11.7–15.7)
HOLD SPECIMEN: NORMAL
IMM GRANULOCYTES # BLD: 0 10E3/UL
IMM GRANULOCYTES NFR BLD: 0 %
INR PPP: 0.93 (ref 0.85–1.15)
INTERPRETATION ECG - MUSE: NORMAL
LYMPHOCYTES # BLD AUTO: 2.4 10E3/UL (ref 0.8–5.3)
LYMPHOCYTES NFR BLD AUTO: 30 %
MCH RBC QN AUTO: 33 PG (ref 26.5–33)
MCHC RBC AUTO-ENTMCNC: 32.6 G/DL (ref 31.5–36.5)
MCV RBC AUTO: 101 FL (ref 78–100)
MONOCYTES # BLD AUTO: 0.5 10E3/UL (ref 0–1.3)
MONOCYTES NFR BLD AUTO: 6 %
NEUTROPHILS # BLD AUTO: 4.8 10E3/UL (ref 1.6–8.3)
NEUTROPHILS NFR BLD AUTO: 59 %
NRBC # BLD AUTO: 0 10E3/UL
NRBC BLD AUTO-RTO: 0 /100
P AXIS - MUSE: 58 DEGREES
PLATELET # BLD AUTO: 274 10E3/UL (ref 150–450)
POTASSIUM SERPL-SCNC: 4.5 MMOL/L (ref 3.4–5.3)
PR INTERVAL - MUSE: 154 MS
QRS DURATION - MUSE: 92 MS
QT - MUSE: 388 MS
QTC - MUSE: 421 MS
R AXIS - MUSE: 14 DEGREES
RBC # BLD AUTO: 3.33 10E6/UL (ref 3.8–5.2)
SODIUM SERPL-SCNC: 137 MMOL/L (ref 135–145)
SYSTOLIC BLOOD PRESSURE - MUSE: NORMAL MMHG
T AXIS - MUSE: 32 DEGREES
TROPONIN T SERPL HS-MCNC: 35 NG/L
UFH PPP CHRO-ACNC: 0.53 IU/ML
VENTRICULAR RATE- MUSE: 71 BPM
WBC # BLD AUTO: 8.2 10E3/UL (ref 4–11)

## 2024-06-12 PROCEDURE — 210N000002 HC R&B HEART CARE

## 2024-06-12 PROCEDURE — 84484 ASSAY OF TROPONIN QUANT: CPT | Performed by: EMERGENCY MEDICINE

## 2024-06-12 PROCEDURE — 85520 HEPARIN ASSAY: CPT | Performed by: HOSPITALIST

## 2024-06-12 PROCEDURE — 82565 ASSAY OF CREATININE: CPT | Performed by: EMERGENCY MEDICINE

## 2024-06-12 PROCEDURE — 99223 1ST HOSP IP/OBS HIGH 75: CPT | Mod: 25 | Performed by: INTERNAL MEDICINE

## 2024-06-12 PROCEDURE — 250N000013 HC RX MED GY IP 250 OP 250 PS 637: Performed by: HOSPITALIST

## 2024-06-12 PROCEDURE — 85025 COMPLETE CBC W/AUTO DIFF WBC: CPT | Performed by: EMERGENCY MEDICINE

## 2024-06-12 PROCEDURE — 36415 COLL VENOUS BLD VENIPUNCTURE: CPT | Performed by: HOSPITALIST

## 2024-06-12 PROCEDURE — 85730 THROMBOPLASTIN TIME PARTIAL: CPT | Performed by: EMERGENCY MEDICINE

## 2024-06-12 PROCEDURE — 85610 PROTHROMBIN TIME: CPT | Performed by: EMERGENCY MEDICINE

## 2024-06-12 PROCEDURE — 250N000011 HC RX IP 250 OP 636: Mod: JZ | Performed by: EMERGENCY MEDICINE

## 2024-06-12 PROCEDURE — 99223 1ST HOSP IP/OBS HIGH 75: CPT | Performed by: HOSPITALIST

## 2024-06-12 PROCEDURE — 99285 EMERGENCY DEPT VISIT HI MDM: CPT

## 2024-06-12 PROCEDURE — 71046 X-RAY EXAM CHEST 2 VIEWS: CPT

## 2024-06-12 PROCEDURE — 93005 ELECTROCARDIOGRAM TRACING: CPT

## 2024-06-12 PROCEDURE — 36415 COLL VENOUS BLD VENIPUNCTURE: CPT | Performed by: EMERGENCY MEDICINE

## 2024-06-12 PROCEDURE — 82374 ASSAY BLOOD CARBON DIOXIDE: CPT | Performed by: EMERGENCY MEDICINE

## 2024-06-12 RX ORDER — NALOXONE HYDROCHLORIDE 0.4 MG/ML
0.4 INJECTION, SOLUTION INTRAMUSCULAR; INTRAVENOUS; SUBCUTANEOUS
Status: DISCONTINUED | OUTPATIENT
Start: 2024-06-12 | End: 2024-06-15 | Stop reason: HOSPADM

## 2024-06-12 RX ORDER — ACETAMINOPHEN 500 MG
1000 TABLET ORAL 3 TIMES DAILY PRN
Status: DISCONTINUED | OUTPATIENT
Start: 2024-06-12 | End: 2024-06-15 | Stop reason: HOSPADM

## 2024-06-12 RX ORDER — HYDRALAZINE HYDROCHLORIDE 20 MG/ML
10 INJECTION INTRAMUSCULAR; INTRAVENOUS EVERY 4 HOURS PRN
Status: DISCONTINUED | OUTPATIENT
Start: 2024-06-12 | End: 2024-06-15 | Stop reason: HOSPADM

## 2024-06-12 RX ORDER — CLOPIDOGREL BISULFATE 75 MG/1
75 TABLET ORAL DAILY
Status: DISCONTINUED | OUTPATIENT
Start: 2024-06-13 | End: 2024-06-15 | Stop reason: HOSPADM

## 2024-06-12 RX ORDER — PANTOPRAZOLE SODIUM 40 MG/1
40 TABLET, DELAYED RELEASE ORAL DAILY
Status: DISCONTINUED | OUTPATIENT
Start: 2024-06-13 | End: 2024-06-15 | Stop reason: HOSPADM

## 2024-06-12 RX ORDER — NITROGLYCERIN 0.4 MG/1
0.4 TABLET SUBLINGUAL EVERY 5 MIN PRN
Status: DISCONTINUED | OUTPATIENT
Start: 2024-06-12 | End: 2024-06-15 | Stop reason: HOSPADM

## 2024-06-12 RX ORDER — HYDRALAZINE HYDROCHLORIDE 10 MG/1
10 TABLET, FILM COATED ORAL EVERY 4 HOURS PRN
Status: DISCONTINUED | OUTPATIENT
Start: 2024-06-12 | End: 2024-06-15 | Stop reason: HOSPADM

## 2024-06-12 RX ORDER — CALCIUM CARBONATE 500 MG/1
1000 TABLET, CHEWABLE ORAL 4 TIMES DAILY PRN
Status: DISCONTINUED | OUTPATIENT
Start: 2024-06-12 | End: 2024-06-15 | Stop reason: HOSPADM

## 2024-06-12 RX ORDER — OXYCODONE HYDROCHLORIDE 5 MG/1
5 TABLET ORAL EVERY 6 HOURS PRN
Status: DISCONTINUED | OUTPATIENT
Start: 2024-06-12 | End: 2024-06-15 | Stop reason: HOSPADM

## 2024-06-12 RX ORDER — ASPIRIN 81 MG/1
81 TABLET, CHEWABLE ORAL DAILY
Status: DISCONTINUED | OUTPATIENT
Start: 2024-06-13 | End: 2024-06-15 | Stop reason: HOSPADM

## 2024-06-12 RX ORDER — ACETAMINOPHEN 325 MG/1
650 TABLET ORAL EVERY 4 HOURS PRN
Status: DISCONTINUED | OUTPATIENT
Start: 2024-06-12 | End: 2024-06-12

## 2024-06-12 RX ORDER — CARVEDILOL 6.25 MG/1
6.25 TABLET ORAL 2 TIMES DAILY WITH MEALS
Status: DISCONTINUED | OUTPATIENT
Start: 2024-06-12 | End: 2024-06-15

## 2024-06-12 RX ORDER — POTASSIUM CHLORIDE 1500 MG/1
20 TABLET, EXTENDED RELEASE ORAL
Status: CANCELLED | OUTPATIENT
Start: 2024-06-12

## 2024-06-12 RX ORDER — NALOXONE HYDROCHLORIDE 0.4 MG/ML
0.2 INJECTION, SOLUTION INTRAMUSCULAR; INTRAVENOUS; SUBCUTANEOUS
Status: DISCONTINUED | OUTPATIENT
Start: 2024-06-12 | End: 2024-06-15 | Stop reason: HOSPADM

## 2024-06-12 RX ORDER — LEVOTHYROXINE SODIUM 25 UG/1
25 TABLET ORAL
Status: DISCONTINUED | OUTPATIENT
Start: 2024-06-13 | End: 2024-06-15 | Stop reason: HOSPADM

## 2024-06-12 RX ORDER — ESCITALOPRAM OXALATE 5 MG/1
5 TABLET ORAL DAILY
Status: DISCONTINUED | OUTPATIENT
Start: 2024-06-13 | End: 2024-06-15 | Stop reason: HOSPADM

## 2024-06-12 RX ORDER — SODIUM CHLORIDE 9 MG/ML
INJECTION, SOLUTION INTRAVENOUS CONTINUOUS
Status: CANCELLED | OUTPATIENT
Start: 2024-06-12

## 2024-06-12 RX ORDER — ASPIRIN 81 MG/1
243 TABLET, CHEWABLE ORAL ONCE
Status: CANCELLED | OUTPATIENT
Start: 2024-06-12

## 2024-06-12 RX ORDER — ATORVASTATIN CALCIUM 80 MG/1
80 TABLET, FILM COATED ORAL DAILY
COMMUNITY

## 2024-06-12 RX ORDER — ATORVASTATIN CALCIUM 80 MG/1
80 TABLET, FILM COATED ORAL DAILY
Status: DISCONTINUED | OUTPATIENT
Start: 2024-06-13 | End: 2024-06-15 | Stop reason: HOSPADM

## 2024-06-12 RX ORDER — EZETIMIBE 10 MG/1
10 TABLET ORAL DAILY
Status: DISCONTINUED | OUTPATIENT
Start: 2024-06-13 | End: 2024-06-15 | Stop reason: HOSPADM

## 2024-06-12 RX ORDER — PROCHLORPERAZINE MALEATE 10 MG
5 TABLET ORAL EVERY 6 HOURS PRN
COMMUNITY
End: 2024-08-09

## 2024-06-12 RX ORDER — ASPIRIN 325 MG
325 TABLET ORAL ONCE
Status: CANCELLED | OUTPATIENT
Start: 2024-06-12 | End: 2024-06-12

## 2024-06-12 RX ORDER — LORAZEPAM 0.5 MG/1
0.5 TABLET ORAL EVERY 4 HOURS PRN
Status: DISCONTINUED | OUTPATIENT
Start: 2024-06-12 | End: 2024-06-15 | Stop reason: HOSPADM

## 2024-06-12 RX ORDER — LIDOCAINE 40 MG/G
CREAM TOPICAL
Status: DISCONTINUED | OUTPATIENT
Start: 2024-06-12 | End: 2024-06-15 | Stop reason: HOSPADM

## 2024-06-12 RX ORDER — HEPARIN SODIUM 10000 [USP'U]/100ML
0-5000 INJECTION, SOLUTION INTRAVENOUS CONTINUOUS
Status: DISCONTINUED | OUTPATIENT
Start: 2024-06-12 | End: 2024-06-13

## 2024-06-12 RX ORDER — GABAPENTIN 300 MG/1
600 CAPSULE ORAL 3 TIMES DAILY
Status: DISCONTINUED | OUTPATIENT
Start: 2024-06-12 | End: 2024-06-15 | Stop reason: HOSPADM

## 2024-06-12 RX ORDER — LIDOCAINE 40 MG/G
CREAM TOPICAL
Status: CANCELLED | OUTPATIENT
Start: 2024-06-12

## 2024-06-12 RX ORDER — ACETAMINOPHEN 650 MG/1
650 SUPPOSITORY RECTAL EVERY 4 HOURS PRN
Status: DISCONTINUED | OUTPATIENT
Start: 2024-06-12 | End: 2024-06-15 | Stop reason: HOSPADM

## 2024-06-12 RX ORDER — MECLIZINE HCL 12.5 MG 12.5 MG/1
12.5 TABLET ORAL EVERY MORNING
Status: DISCONTINUED | OUTPATIENT
Start: 2024-06-13 | End: 2024-06-15 | Stop reason: HOSPADM

## 2024-06-12 RX ORDER — AMOXICILLIN 250 MG
2 CAPSULE ORAL 2 TIMES DAILY PRN
Status: DISCONTINUED | OUTPATIENT
Start: 2024-06-12 | End: 2024-06-15 | Stop reason: HOSPADM

## 2024-06-12 RX ORDER — AMOXICILLIN 250 MG
1 CAPSULE ORAL 2 TIMES DAILY PRN
Status: DISCONTINUED | OUTPATIENT
Start: 2024-06-12 | End: 2024-06-15 | Stop reason: HOSPADM

## 2024-06-12 RX ADMIN — ACETAMINOPHEN 1000 MG: 500 TABLET, FILM COATED ORAL at 20:44

## 2024-06-12 RX ADMIN — CARVEDILOL 6.25 MG: 6.25 TABLET, FILM COATED ORAL at 19:04

## 2024-06-12 RX ADMIN — HEPARIN SODIUM 800 UNITS/HR: 10000 INJECTION, SOLUTION INTRAVENOUS at 16:59

## 2024-06-12 RX ADMIN — GABAPENTIN 600 MG: 300 CAPSULE ORAL at 22:40

## 2024-06-12 ASSESSMENT — ACTIVITIES OF DAILY LIVING (ADL)
DRESSING/BATHING_DIFFICULTY: NO
WALKING_OR_CLIMBING_STAIRS_DIFFICULTY: NO
ADLS_ACUITY_SCORE: 37
CONCENTRATING,_REMEMBERING_OR_MAKING_DECISIONS_DIFFICULTY: NO
FALL_HISTORY_WITHIN_LAST_SIX_MONTHS: YES
ADLS_ACUITY_SCORE: 22
ADLS_ACUITY_SCORE: 22
ADLS_ACUITY_SCORE: 35
ADLS_ACUITY_SCORE: 22
DIFFICULTY_COMMUNICATING: NO
NUMBER_OF_TIMES_PATIENT_HAS_FALLEN_WITHIN_LAST_SIX_MONTHS: 3
CHANGE_IN_FUNCTIONAL_STATUS_SINCE_ONSET_OF_CURRENT_ILLNESS/INJURY: NO
ADLS_ACUITY_SCORE: 24
DIFFICULTY_EATING/SWALLOWING: NO
WEAR_GLASSES_OR_BLIND: NO
HEARING_DIFFICULTY_OR_DEAF: NO
ADLS_ACUITY_SCORE: 37
DOING_ERRANDS_INDEPENDENTLY_DIFFICULTY: NO
ADLS_ACUITY_SCORE: 22
ADLS_ACUITY_SCORE: 37

## 2024-06-12 NOTE — ED TRIAGE NOTES
CP since her angiogram early May. This morning pain has become worse, two nitroglycerin with relief. Pt is scheduled for an angiogram tomorrow.

## 2024-06-12 NOTE — CONSULTS
[facility], [place]    Cardiology Consultation     Date of Admission:  6/12/2024    Assessment & Plan   Michaela Dorman is a 73 year old female who was admitted on 6/12/2024.    NSTEMI  Coronary disease status post recent stenting of the first obtuse marginal branch with known residual significant circumflex disease and moderate LAD disease  CKD  Carotid artery disease    PLAN:  Initiated heparin given elevated troponin  Continue dual antiplatelet therapy  Proceed with coronary angiography as planned.  Angiogram to be performed at her outpatient slot tomorrow  Limited echocardiogram      Dany Lauren MD    High complexity       Primary Care Physician   Phani Albright    Reason for Consult   Reason for consult: NSTEMI    History of Present Illness   Michaela Dorman is a 73 year old female with with known coronary disease status post recent PCI (April 23, 2024), hypertension, hyperlipidemia, bladder cancer and coronary disease who presented to the emergency department with prolonged chest pain.    She was evaluated in the spring with exertional chest pain and underwent coronary angiography.  The angiogram which I reviewed demonstrated high-grade stenosis involving the first obtuse marginal branch which was successfully stented with 2 drug-eluting stents.  There was a high-grade residual stenosis in the proximal circumflex involving the obtuse marginal branch which was treated.  There was also moderate calcified LAD lesion which did not appear flow-limiting angiographically.    She reports intermittent chest pain since her PCI.  However the symptoms become more frequent over the past couple weeks.  Today the pain was rather severe and radiated to her neck.  The pain was associated with diaphoresis.  She took sublingual nitroglycerin with subsequent resolution of her symptoms.  She is currently pain-free.  No ST elevation on electrocardiogram and troponin is trivially elevated.    Past Medical History    Past Medical History:   Diagnosis Date    Acute kidney injury (H24)     Carotid stenosis, bilateral L80%, R40% 09/02/2020    Central stenosis of spinal canal 12/01/2017    lumbar     DDD (degenerative disc disease), lumbar 12/01/2017    Depressive disorder, not elsewhere classified     Esophageal reflux     ETOH abuse     in remission    Foraminal stenosis of lumbar region 12/01/2017    Complete lumbar spine left at various degrees and to a lesser extent the right as well.    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    Subdural hematoma (H)     Thyroid disease     Urothelial carcinoma (H)        Past Surgical History   Past Surgical History:   Procedure Laterality Date    ARTHROPLASTY SHOULDER Right 11/17/2015    ARTHROSCOPY KNEE  6/7/2012    Procedure:ARTHROSCOPY KNEE; right knee arthroscopy with partial lateral menisectomy; Surgeon:DAMIÁN MCALLISTER; Location:[facility]    BIOPSY VAGINAL N/A 10/27/2021    Procedure: DISTAL URETHRECTOMY;  Surgeon: Leonardo Robles MD;  Location: [facility] OR    COLONOSCOPY N/A 12/22/2017    Procedure: COLONOSCOPY;  colonoscopy;  Surgeon: Chuck Chand MD;  Location: [facility]    CV CORONARY ANGIOGRAM N/A 4/23/2024    Procedure: Coronary Angiogram;  Surgeon: Leonardo Marinelli MD;  Location:  [facility]    CV INTRAVASULAR ULTRASOUND N/A 4/23/2024    Procedure: Intravascular Ultrasound;  Surgeon: Leonardo Marinelli MD;  Location:  [facility]    CV LEFT HEART CATH N/A 4/23/2024    Procedure: Left Heart Catheterization;  Surgeon: Leonardo Marinelli MD;  Location:  [facility]    CV PCI STENT DRUG ELUTING N/A 4/23/2024    Procedure: Percutaneous Coronary Intervention Stent;  Surgeon: Leonardo Marinelli MD;  Location:   [facility]    CYSTECTOMY BLADDER RADICAL, ILEAL DIVERSION, COMBINED N/A 6/1/2021    Procedure: RADICAL CYSTECTOMY  WITH ILEAL CONDUIT CREATION,BILATERAL PELVIC LYMPHADENECTOMY;  Surgeon: Leonardo Robles MD;  Location: [facility]    CYSTOSCOPY, RETROGRADES, COMBINED Bilateral 3/18/2021    Procedure: CYSTOSCOPY, WITH RETROGRADE PYELOGRAM;  Surgeon: Girish Jack MD;  Location: [facility]    CYSTOSCOPY, TRANSURETHRAL RESECTION (TUR) TUMOR BLADDER, COMBINED N/A 3/18/2021    Procedure: CYSTOSCOPY, WITH TRANSURETHRAL RESECTION BLADDER TUMOR;  Surgeon: Girish Jack MD;  Location: [facility]    ENDARTERECTOMY CAROTID Left 10/8/2020    Procedure: LEFT CAROTID ENDARTERECTOMY WITH EEG;  Surgeon: Lacho Jasmine MD;  Location: [facility]    ESOPHAGOSCOPY, GASTROSCOPY, DUODENOSCOPY (EGD), COMBINED N/A 6/20/2022    Procedure: ESOPHAGOGASTRODUODENOSCOPY, WITH BIOPSY;  Surgeon: Ramses Arenas MD;  Location: [facility]    EXCISE MASS AXILLA Left 9/16/2016    Procedure: EXCISE MASS AXILLA;  Surgeon: Keyon Blanco MD;  Location: [facility]    HC REMV CATARACT EXTRACAP,INSERT LENS, W/O ECP  6/25/2009    Right eye    INJECT EPIDURAL LUMBAR N/A 4/11/2019    Procedure: interlaminar epidual steroid injection lumbar 4-5;  Surgeon: Oskar Salvador MD;  [facility]    INJECT EPIDURAL LUMBAR Bilateral 9/12/2019    Procedure: lumbar 4-5 epidural interlaminar steroid injection;  Surgeon: Oskar Salvador MD;  [facility]    INSERT PORT VASCULAR ACCESS Right 10/7/2016    Procedure: INSERT PORT VASCULAR ACCESS;  Surgeon: Keyon Blanco MD;  [facility]    IR CHEST PORT PLACEMENT > 5 YRS OF AGE  4/16/2021    MASTECTOMY SIMPLE BILATERAL Bilateral 2/8/2017    Procedure: MASTECTOMY SIMPLE BILATERAL;  Surgeon: Keoyn Blanco MD;  [facility]: [facility]    OPEN REDUCTION INTERNAL FIXATION FOOT Right 3/20/2015    Procedure: OPEN REDUCTION INTERNAL FIXATION FOOT;  Surgeon: Javi Herrmann DPM;  Location: [facility]    OPTICAL TRACKING  "SYSTEM FUSION SPINE POSTERIOR LUMBAR TWO LEVELS N/A 2/4/2020    Procedure: LUMBAR 2-SACRAL1 TRANSFORMINAL INTERBODY FUSION;  Surgeon: Lenny Gomes MD;  Location: [facility]    REMOVE PORT VASCULAR ACCESS Right 2/14/2018    Procedure: REMOVE PORT VASCULAR ACCESS;  right intra jugular port removal;  Surgeon: Keyon Blanco MD;  Location[facility]    SHOULDER SURGERY Right 11/2015    Z LIGATE FALLOPIAN TUBE      Z NONSPECIFIC PROCEDURE  1956    ankle fracture surgery       Prior to Admission Medications   Prior to Admission Medications   Prescriptions Last Dose Informant Patient Reported? Taking?   Cyanocobalamin (B-12 PO)  Self Yes No   Sig: Take 1 tablet by mouth daily   LORazepam (ATIVAN) 0.5 MG tablet   No No   Sig: Take 1 tablet (0.5 mg) by mouth every 4 hours as needed for anxiety, nausea or sleep   acetaminophen (TYLENOL) 500 MG tablet  Self Yes No   Sig: Take 1,000 mg by mouth 3 times daily as needed for mild pain (500MG X 2 = 1,000MG)   aspirin (ASA) 81 MG chewable tablet  Self No No   Sig: Take 1 tablet (81 mg) by mouth daily   atorvastatin (LIPITOR) 40 MG tablet   No No   Sig: Take 2 tablets (80 mg) by mouth daily   carvedilol (COREG) 6.25 MG tablet  Self No No   Sig: TAKE 1 TABLET (6.25 MG) BY MOUTH 2 TIMES DAILY (WITH MEALS) PLEASE SEND NEXT REFILL TO PRIMARY CARE AS NEPHROLOGY FOLLOW UP \"AS NEEDED\"   clopidogrel (PLAVIX) 75 MG tablet   No No   Sig: Take 1 tablet (75 mg) by mouth daily   escitalopram (LEXAPRO) 10 MG tablet   No No   Sig: Take 1 tablet (10 mg) by mouth daily   ezetimibe (ZETIA) 10 MG tablet   No No   Sig: Take 1 tablet (10 mg) by mouth daily   gabapentin (NEURONTIN) 300 MG capsule  Self No No   Sig: Take 2 capsules (600 mg) by mouth 3 times daily   levothyroxine (SYNTHROID/LEVOTHROID) 25 MCG tablet   No No   Sig: Take 1 tablet (25 mcg) by mouth daily   meclizine (ANTIVERT) 25 MG tablet  Self Yes No   Sig: Take 12.5 mg by mouth every morning 1/2 tablet (to mitigate nausea from morning " "meds)   nitroFURantoin macrocrystal-monohydrate (MACROBID) 100 MG capsule   No No   Sig: Take 1 capsule (100 mg) by mouth 2 times daily   Patient not taking: Reported on 5/28/2024   nitroGLYcerin (NITROSTAT) 0.4 MG sublingual tablet   No No   Sig: For chest pain place 1 tablet under the tongue every 5 minutes for 3 doses. If symptoms persist 5 minutes after 2nd dose call 911.   oxyCODONE (ROXICODONE) 5 MG tablet   No No   Sig: TAKE 1 TABLET (5 MG) BY MOUTH EVERY 6 HOURS AS NEEDED FOR MODERATE TO SEVERE PAIN   pantoprazole (PROTONIX) 40 MG EC tablet   No No   Sig: Take 1 tablet (40 mg) by mouth daily   sucralfate (CARAFATE) 1 GM tablet   Yes No   Sig: Take 1 g by mouth 2 times daily as needed for nausea (usually takes before supper)      Facility-Administered Medications: None     Current Facility-Administered Medications   Medication Dose Route Frequency Provider Last Rate Last Admin    heparin 25,000 units in 0.45% NaCl 250 mL ANTICOAGULANT infusion  0-5,000 Units/hr Intravenous Continuous Neuner, Camila Gary MD 8 mL/hr at 06/12/24 1659 800 Units/hr at 06/12/24 1659     Current Outpatient Medications   Medication Sig Dispense Refill    acetaminophen (TYLENOL) 500 MG tablet Take 1,000 mg by mouth 3 times daily as needed for mild pain (500MG X 2 = 1,000MG)      aspirin (ASA) 81 MG chewable tablet Take 1 tablet (81 mg) by mouth daily 100 tablet 3    atorvastatin (LIPITOR) 40 MG tablet Take 2 tablets (80 mg) by mouth daily 180 tablet 3    carvedilol (COREG) 6.25 MG tablet TAKE 1 TABLET (6.25 MG) BY MOUTH 2 TIMES DAILY (WITH MEALS) PLEASE SEND NEXT REFILL TO PRIMARY CARE AS NEPHROLOGY FOLLOW UP \"AS NEEDED\" 90 tablet 0    clopidogrel (PLAVIX) 75 MG tablet Take 1 tablet (75 mg) by mouth daily 90 tablet 3    Cyanocobalamin (B-12 PO) Take 1 tablet by mouth daily      escitalopram (LEXAPRO) 10 MG tablet Take 1 tablet (10 mg) by mouth daily 90 tablet 3    ezetimibe (ZETIA) 10 MG tablet Take 1 tablet (10 mg) by mouth daily 90 " tablet 3    gabapentin (NEURONTIN) 300 MG capsule Take 2 capsules (600 mg) by mouth 3 times daily 180 capsule 5    levothyroxine (SYNTHROID/LEVOTHROID) 25 MCG tablet Take 1 tablet (25 mcg) by mouth daily 90 tablet 1    LORazepam (ATIVAN) 0.5 MG tablet Take 1 tablet (0.5 mg) by mouth every 4 hours as needed for anxiety, nausea or sleep 20 tablet 0    meclizine (ANTIVERT) 25 MG tablet Take 12.5 mg by mouth every morning 1/2 tablet (to mitigate nausea from morning meds)      nitroFURantoin macrocrystal-monohydrate (MACROBID) 100 MG capsule Take 1 capsule (100 mg) by mouth 2 times daily (Patient not taking: Reported on 5/28/2024) 14 capsule 0    nitroGLYcerin (NITROSTAT) 0.4 MG sublingual tablet For chest pain place 1 tablet under the tongue every 5 minutes for 3 doses. If symptoms persist 5 minutes after 2nd dose call 911. 30 tablet 0    oxyCODONE (ROXICODONE) 5 MG tablet TAKE 1 TABLET (5 MG) BY MOUTH EVERY 6 HOURS AS NEEDED FOR MODERATE TO SEVERE PAIN 30 tablet 0    pantoprazole (PROTONIX) 40 MG EC tablet Take 1 tablet (40 mg) by mouth daily 90 tablet 3    sucralfate (CARAFATE) 1 GM tablet Take 1 g by mouth 2 times daily as needed for nausea (usually takes before supper)       Current Facility-Administered Medications   Medication Dose Route Frequency Provider Last Rate Last Admin    heparin 25,000 units in 0.45% NaCl 250 mL ANTICOAGULANT infusion  0-5,000 Units/hr Intravenous Continuous Neuner, Camila Gary MD 8 mL/hr at 06/12/24 1659 800 Units/hr at 06/12/24 1659     Current Outpatient Medications   Medication Sig Dispense Refill    acetaminophen (TYLENOL) 500 MG tablet Take 1,000 mg by mouth 3 times daily as needed for mild pain (500MG X 2 = 1,000MG)      aspirin (ASA) 81 MG chewable tablet Take 1 tablet (81 mg) by mouth daily 100 tablet 3    atorvastatin (LIPITOR) 40 MG tablet Take 2 tablets (80 mg) by mouth daily 180 tablet 3    carvedilol (COREG) 6.25 MG tablet TAKE 1 TABLET (6.25 MG) BY MOUTH 2 TIMES DAILY (WITH  "MEALS) PLEASE SEND NEXT REFILL TO PRIMARY CARE AS NEPHROLOGY FOLLOW UP \"AS NEEDED\" 90 tablet 0    clopidogrel (PLAVIX) 75 MG tablet Take 1 tablet (75 mg) by mouth daily 90 tablet 3    Cyanocobalamin (B-12 PO) Take 1 tablet by mouth daily      escitalopram (LEXAPRO) 10 MG tablet Take 1 tablet (10 mg) by mouth daily 90 tablet 3    ezetimibe (ZETIA) 10 MG tablet Take 1 tablet (10 mg) by mouth daily 90 tablet 3    gabapentin (NEURONTIN) 300 MG capsule Take 2 capsules (600 mg) by mouth 3 times daily 180 capsule 5    levothyroxine (SYNTHROID/LEVOTHROID) 25 MCG tablet Take 1 tablet (25 mcg) by mouth daily 90 tablet 1    LORazepam (ATIVAN) 0.5 MG tablet Take 1 tablet (0.5 mg) by mouth every 4 hours as needed for anxiety, nausea or sleep 20 tablet 0    meclizine (ANTIVERT) 25 MG tablet Take 12.5 mg by mouth every morning 1/2 tablet (to mitigate nausea from morning meds)      nitroFURantoin macrocrystal-monohydrate (MACROBID) 100 MG capsule Take 1 capsule (100 mg) by mouth 2 times daily (Patient not taking: Reported on 5/28/2024) 14 capsule 0    nitroGLYcerin (NITROSTAT) 0.4 MG sublingual tablet For chest pain place 1 tablet under the tongue every 5 minutes for 3 doses. If symptoms persist 5 minutes after 2nd dose call 911. 30 tablet 0    oxyCODONE (ROXICODONE) 5 MG tablet TAKE 1 TABLET (5 MG) BY MOUTH EVERY 6 HOURS AS NEEDED FOR MODERATE TO SEVERE PAIN 30 tablet 0    pantoprazole (PROTONIX) 40 MG EC tablet Take 1 tablet (40 mg) by mouth daily 90 tablet 3    sucralfate (CARAFATE) 1 GM tablet Take 1 g by mouth 2 times daily as needed for nausea (usually takes before supper)       Allergies   Allergies   Allergen Reactions    Oxybutynin      Patient reports symptoms of nausea and mind fogginess       Social History    reports that she quit smoking about 44 years ago. Her smoking use included cigarettes. She started smoking about 54 years ago. She has a 30 pack-year smoking history. She has never been exposed to tobacco smoke. " "She has never used smokeless tobacco. She reports that she does not currently use alcohol. She reports that she does not use drugs.      Family History   I have reviewed this patient's family history and updated it with pertinent information if needed.  Family History   Problem Relation Age of Onset    Hypertension Mother     Thyroid Disease Mother     Cerebrovascular Disease Mother     Hypertension Father     Cerebrovascular Disease Father     Cancer Father         skin    Thyroid Disease Sister     Diabetes Brother         type 2    Depression Sister     Breast Cancer Sister         age 47    Cancer Sister         skin    Cancer Brother         inside nose          Review of Systems   A comprehensive review of system was performed and is negative other than that noted in the HPI or here.     Physical Exam   Vital Signs with Ranges  Temp:  [98.6  F (37  C)] 98.6  F (37  C)  Pulse:  [77] 77  Resp:  [18] 18  BP: (154)/(80) 154/80  SpO2:  [95 %] 95 %  Wt Readings from Last 4 Encounters:   06/12/24 65.8 kg (145 lb)   05/28/24 65.8 kg (145 lb)   05/07/24 63.5 kg (140 lb)   05/06/24 64.4 kg (142 lb)     No intake/output data recorded.      Vitals: BP (!) 154/80 (BP Location: Right arm, Patient Position: Sitting, Cuff Size: Adult Regular)   Pulse 77   Temp 98.6  F (37  C) (Temporal)   Resp 18   Wt 65.8 kg (145 lb)   SpO2 95%   BMI 24.13 kg/m      Physical Exam:   General - Alert and oriented to time place and person in no acute distress  Eyes - No scleral icterus  HEENT - Neck supple, moist mucous membranes  Cardiovascular -regular rate and rhythm, no murmur  Extremities - There is no edema  Respiratory -clear  Skin - No pallor or cyanosis  Gastrointestinal - Non tender and non distended without rebound or guarding  Psych - Appropriate affect   Neurological - No gross motor neurological focal deficits    No lab results found in last 7 days.    Invalid input(s): \"TROPONINIES\"    Recent Labs   Lab 06/12/24  1449 " "06/12/24  1447   WBC  --  8.2   HGB  --  11.0*   MCV  --  101*   PLT  --  274   INR 0.93  --    NA  --  137   POTASSIUM  --  4.5   CHLORIDE  --  101   CO2  --  21*   BUN  --  24.2*   CR  --  1.07*   GFRESTIMATED  --  54*   ANIONGAP  --  15   VADNANA  --  10.2   GLC  --  89     Recent Labs   Lab Test 04/29/24  0816 03/20/24  1306   CHOL 194 233*   HDL 88 72   LDL 85 116*   TRIG 106 224*     Recent Labs   Lab 06/12/24  1447   WBC 8.2   HGB 11.0*   HCT 33.7*   *        No results for input(s): \"PH\", \"PHV\", \"PO2\", \"PO2V\", \"SAT\", \"PCO2\", \"PCO2V\", \"HCO3\", \"HCO3V\" in the last 168 hours.  No results for input(s): \"NTBNPI\", \"NTBNP\" in the last 168 hours.  No results for input(s): \"DD\" in the last 168 hours.  No results for input(s): \"SED\", \"CRP\" in the last 168 hours.  Recent Labs   Lab 06/12/24  1447        No results for input(s): \"TSH\" in the last 168 hours.  No results for input(s): \"COLOR\", \"APPEARANCE\", \"URINEGLC\", \"URINEBILI\", \"URINEKETONE\", \"SG\", \"UBLD\", \"URINEPH\", \"PROTEIN\", \"UROBILINOGEN\", \"NITRITE\", \"LEUKEST\", \"RBCU\", \"WBCU\" in the last 168 hours.    Imaging:  Recent Results (from the past 48 hour(s))   XR Chest 2 Views    Narrative    CHEST TWO VIEWS 6/12/2024 3:11 PM     HISTORY: Chest pain.    COMPARISON: CT chest 5/24/2024.       Impression    IMPRESSION: No focal consolidation, pleural effusion or pneumothorax.  Cardiomediastinal silhouette is unremarkable. Right chest port and  right shoulder arthroplasty. Lumbar fusion hardware. Coronary stent.    ELLIE NAIR MD         SYSTEM ID:  [id number]       Echo:  No results found for this or any previous visit (from the past 4320 hour(s)).    Clinically Significant Risk Factors Present on Admission           # Hypercalcemia: Highest Ca = 10.2 mg/dL in last 2 days, will monitor as appropriate      # Drug Induced Platelet Defect: home medication list includes an antiplatelet medication   # Hypertension: Noted on problem list        "

## 2024-06-12 NOTE — ED PROVIDER NOTES
Emergency Department Note      History of Present Illness     Chief Complaint  Chest Pain    HPI  Michaela Dorman is a 73 year old female who presents to the emergency department for the evaluation of chest pain. On Monday, she reports experiencing chest pain that radiated up her neck along with diaphoresis. She took a nitroglycerin which improved her symptoms. The pain returned at 0900 earlier this morning while she was sitting down. The severe pain lasted 3 hours and taking 2 nitroglycerin reduced her pain. No radiation or diaphoresis today. She contacted her cardiologist via [employer] and received a call back referring her to the ED. She has been experiencing chest pain since May 1, 2024 after a stent placement. She reports no fever or cough. She is not on any blood thinners, but is on cardiac medications and aspirin. She has an angiogram scheduled for tomorrow.    Independent Historian  None    Review of External Notes  Telephone calls from today, office visit from 5/28/2024  Past Medical History   Medical History and Problem List  Baker's cyst of knee  Patella-femoral syndrome  Moderate major depression  Hypertension  Trochanteric bursitis  Enthesopathy of hip  Esophageal reflux  Subdural hematoma  Alcohol abuse  Arthritis of shoulder region, right    Medications  Prilosec  Prozac  Hydrochlorothiazide  Fish oil  celecoxib    Surgical History   Ankle fracture surgery  Pr Ligate fallopian tube  Cataract right eye  Right knee arthroscopy  Orif foot fracture  Shoulder replacement  Physical Exam   Patient Vitals for the past 24 hrs:   BP Temp Temp src Pulse Resp SpO2 Weight   06/12/24 1442 (!) 154/80 98.6  F (37  C) Temporal 77 18 95 % 65.8 kg (145 lb)     Physical Exam  General: Patient is alert and normal appearing.  HEENT: Head atraumatic    Eyes: pupils equal and reactive. Conjunctiva clear   Nares: patent   Oropharynx: no lesions, uvula midline, no palatal draping, normal voice, no trismus  Neck: Supple  without lymphadenopathy, no meningismus  Chest: Heart regular rate and rhythm.   Lungs: Equal clear to auscultation with no wheeze or rales  Abdomen: Soft, non tender, nondistended, normal bowel sounds  Back: No costovertebral angle tenderness, no midline C, T or L spine tenderness  Neuro: Grossly nonfocal, normal speech, strength equal bilaterally, CN 2-12 intact  Extremities: No deformities, equal radial and DP pulses. No clubbing, cyanosis.  No edema  Skin: Warm and dry with no rash.     Diagnostics   Lab Results   Labs Ordered and Resulted from Time of ED Arrival to Time of ED Departure   BASIC METABOLIC PANEL - Abnormal       Result Value    Sodium 137      Potassium 4.5      Chloride 101      Carbon Dioxide (CO2) 21 (*)     Anion Gap 15      Urea Nitrogen 24.2 (*)     Creatinine 1.07 (*)     GFR Estimate 54 (*)     Calcium 10.2      Glucose 89     TROPONIN T, HIGH SENSITIVITY - Abnormal    Troponin T, High Sensitivity 35 (*)    CBC WITH PLATELETS AND DIFFERENTIAL - Abnormal    WBC Count 8.2      RBC Count 3.33 (*)     Hemoglobin 11.0 (*)     Hematocrit 33.7 (*)      (*)     MCH 33.0      MCHC 32.6      RDW 14.6      Platelet Count 274      % Neutrophils 59      % Lymphocytes 30      % Monocytes 6      % Eosinophils 4      % Basophils 1      % Immature Granulocytes 0      NRBCs per 100 WBC 0      Absolute Neutrophils 4.8      Absolute Lymphocytes 2.4      Absolute Monocytes 0.5      Absolute Eosinophils 0.3      Absolute Basophils 0.1      Absolute Immature Granulocytes 0.0      Absolute NRBCs 0.0     INR - Normal    INR 0.93     PARTIAL THROMBOPLASTIN TIME - Normal    aPTT 27         Imaging  XR Chest 2 Views   Final Result   IMPRESSION: No focal consolidation, pleural effusion or pneumothorax.   Cardiomediastinal silhouette is unremarkable. Right chest port and   right shoulder arthroplasty. Lumbar fusion hardware. Coronary stent.      ELLIE NAIR MD            SYSTEM ID:  [id number]           EKG   ECG taken at 1445, ECG read at 1500  Normal sinus rhythm   Rate 71 bpm. UT interval 154 ms. QRS duration 92 ms. QT/QTc 388/421 ms. P-R-T axes 58 14 32.    Independent Interpretation  X-ray Chest shows no acute infiltrate or pneumothorax  ED Course    Medications Administered  Medications   heparin loading dose for LOW INTENSITY TREATMENT * Give BEFORE starting heparin infusion (has no administration in time range)   heparin 25,000 units in 0.45% NaCl 250 mL ANTICOAGULANT infusion (has no administration in time range)       Discussion of Management  None    Social Determinants of Health adding to complexity of care  None    ED Course  ED Course as of 06/12/24 1646   Wed Jun 12, 2024   1455 I checked and evaluated the patient   1550 I rechecked the patient.   1603 I spoke with Dr. Leon of the hospitalist team who admitted the patient.     Medical Decision Making / Diagnosis   CMS Diagnoses: None    MIPS     None    MDM  Michaela Dorman is a 73 year old female who presents with chest pain.  Her history and risk factor analysis are significant for coronary artery disease.  The workup in the Emergency Department (see above for cardiac enzymes and EKG)  is concerning for unstable angina.  Mildly elevated troponin but in the range of previous troponins.  Ongoing symptoms since angiogram on May 1.  Plan for an angiogram tomorrow but worsening symptoms over the last 3 days prompted earlier evaluation in the emergency department.  EKG without new acute ischemic changes..  My clinical suspicion of acute coronary syndrome is high enough to warrant further therapy and investigation.  I will admit the patient  to medicine service for further workup.  Patient's pain is at its normal baseline that it has been every day for the last month.  No ongoing increased pain at this time..  After discussing with her the risks and benefits of heparinization and given lack of contraindication to this therapy, heparin bolus and  drip were started given suspicion of acute coronary syndrome.      There is no clinical, laboratory, or radiographic evidence of pulmonary embolism, AAA, aortic dissection, pneumonia or pneumothorax.     She agrees to be admitted and all questions were answered.     Disposition  The patient was admitted to the hospital.     ICD-10 Codes:    ICD-10-CM    1. Unstable angina (H)  I20.0            Discharge Medications  New Prescriptions    No medications on file         Scribe Disclosure:  I, Gabriela Yoder, am serving as a scribe at 4:47 PM on 6/12/2024 to document services personally performed by Camila Fofana MD based on my observations and the provider's statements to me.        Camila Fofana MD  06/12/24 [id number]

## 2024-06-12 NOTE — Clinical Note
Hemodynamic equipment used: 5 lead ECG, [employer] With 3 Leads, Machine BP Cuff and pulse oximeter probe.

## 2024-06-12 NOTE — ED NOTES
[facility]  ED Nurse Handoff Report    ED Chief complaint: Chest Pain      ED Diagnosis:   Final diagnoses:   Unstable angina (H)       Code Status: Full Code    Allergies:   Allergies   Allergen Reactions    Oxybutynin      Patient reports symptoms of nausea and mind fogginess       Patient Story: CP since her angiogram early May. This morning pain has become worse, two nitroglycerin with relief. Pt is scheduled for an angiogram tomorrow.     Focused Assessment:  AxOx4; VSS; CP lessened after nitro    Treatments and/or interventions provided: Port accessed, labs, imaging, EKG, medication    Patient's response to treatments and/or interventions: See MAR; see results    To be done/followed up on inpatient unit:  See orders    Does this patient have any cognitive concerns?:  No    Activity level - Baseline/Home:  Independent  Activity Level - Current:   Stand with Assist    Patient's Preferred language: English   Needed?: No    Isolation: None  Infection: Not Applicable  Patient tested for COVID 19 prior to admission: NO  Bariatric?: No    Vital Signs:   Vitals:    06/12/24 1442   BP: (!) 154/80   BP Location: Right arm   Patient Position: Sitting   Cuff Size: Adult Regular   Pulse: 77   Resp: 18   Temp: 98.6  F (37  C)   TempSrc: Temporal   SpO2: 95%   Weight: 65.8 kg (145 lb)       Cardiac Rhythm:     Was the PSS-3 completed:   Yes  What interventions are required if any?               Family Comments: , daughter, granddaughter bedside  OBS brochure/video discussed/provided to patient/family: N/A              Name of person given brochure if not patient:               Relationship to patient:     For the majority of the shift this patient's behavior was Green.   No behavioral interventions performed.    ED NURSE PHONE NUMBER: *[phone]

## 2024-06-12 NOTE — PHARMACY-ADMISSION MEDICATION HISTORY
"Pharmacist Admission Medication History    Admission medication history is complete. The information provided in this note is only as accurate as the sources available at the time of the update.    Information Source(s): Patient and CareEverywhere/SureScripts via in-person    Pertinent Information: Patient has not been taking Carafate as has not been having stomach upset issues lately. Also stated she usually only takes PRN oxycodone once in the AM    Changes made to PTA medication list:  Added: Compazine PRN  Deleted: Macrobid  Changed: Atorvastatin 40 mg tab -> 80 mg tab, Coreg BID -> daily, Lexapro 10 mg ->5 mg    Allergies reviewed with patient and updates made in EHR: yes    Medication History Completed By: [facility] 6/12/2024 5:48 PM    PTA Med List   Medication Sig Last Dose    acetaminophen (TYLENOL) 500 MG tablet Take 1,000 mg by mouth 3 times daily as needed for mild pain (500MG X 2 = 1,000MG) 6/12/2024 at PRN    aspirin (ASA) 81 MG chewable tablet Take 1 tablet (81 mg) by mouth daily 6/12/2024 at AM    atorvastatin (LIPITOR) 80 MG tablet Take 80 mg by mouth daily 6/12/2024 at AM    carvedilol (COREG) 6.25 MG tablet TAKE 1 TABLET (6.25 MG) BY MOUTH 2 TIMES DAILY (WITH MEALS) PLEASE SEND NEXT REFILL TO PRIMARY CARE AS NEPHROLOGY FOLLOW UP \"AS NEEDED\" (Patient taking differently: Take 6.25 mg by mouth daily Please send next refill to primary care as nephrology follow up \"as needed\") 6/12/2024 at AM    clopidogrel (PLAVIX) 75 MG tablet Take 1 tablet (75 mg) by mouth daily 6/12/2024 at AM    Cyanocobalamin (B-12 PO) Take 1 tablet by mouth daily 6/12/2024 at AM    escitalopram (LEXAPRO) 10 MG tablet Take 1 tablet (10 mg) by mouth daily (Patient taking differently: Take 5 mg by mouth daily) 6/12/2024 at AM    ezetimibe (ZETIA) 10 MG tablet Take 1 tablet (10 mg) by mouth daily 6/12/2024 at AM    gabapentin (NEURONTIN) 300 MG capsule Take 2 capsules (600 mg) by mouth 3 times daily 6/12/2024 at PM    " levothyroxine (SYNTHROID/LEVOTHROID) 25 MCG tablet Take 1 tablet (25 mcg) by mouth daily 6/12/2024 at AM    LORazepam (ATIVAN) 0.5 MG tablet Take 1 tablet (0.5 mg) by mouth every 4 hours as needed for anxiety, nausea or sleep 6/12/2024 at PRN    meclizine (ANTIVERT) 25 MG tablet Take 12.5 mg by mouth every morning 1/2 tablet (to mitigate nausea from morning meds) 6/12/2024 at AM    nitroGLYcerin (NITROSTAT) 0.4 MG sublingual tablet For chest pain place 1 tablet under the tongue every 5 minutes for 3 doses. If symptoms persist 5 minutes after 2nd dose call 911. Unknown at PRN    oxyCODONE (ROXICODONE) 5 MG tablet TAKE 1 TABLET (5 MG) BY MOUTH EVERY 6 HOURS AS NEEDED FOR MODERATE TO SEVERE PAIN 6/12/2024 at PRN    pantoprazole (PROTONIX) 40 MG EC tablet Take 1 tablet (40 mg) by mouth daily 6/12/2024 at AM    prochlorperazine (COMPAZINE) 10 MG tablet Take 5 mg by mouth every 6 hours as needed for nausea or vomiting Past Month at PRN

## 2024-06-12 NOTE — TELEPHONE ENCOUNTER
Patient's [employer] message 6/11/24:  Good morning. I had some mild pain and tightness at 4 am. This morning. I took 2 nitro and got some relief. Its pretty good this morning. I have an angiogram scheduled for Thursday. Am I OK to just wait. Ty     Called patient yesterday 6/11/24:  Called patient. Patient said she feels great this morning, no symptoms including chest tightness. The chest tightness last night was about a 2 out of 10 so very mild. Went through when to seek emergency medical attention. Instructed patient to not to wait until Thursday for the scheduled angiogram if her chest tightness comes back or is worsened, and or is not relieved by nitroglycerin, or develops other symptoms along with the chest tightness. Instructed patient to not due any strenuous activity prior to scheduled angiogram and to call or mychart again if she has any further questions or concerns. Patient verbalized understanding.         Patient's [employer] message 6/12/24:  Good morning. Just wanted u to know that I took two nitro this morning for very mild but uncomfortable pressure in my chest. It worked. My angiogram is tomorrow morning. Hopefully I will b fine right?     Patient last met with Dr. Choi who ordered the angiogram.       ADDENDUM 12:06PM  Keshia Choi MD Koch, Brenna M, RN       I recommend going to [facility]. Id recommend that and then she is at least monitored and started on heparin before her angiogram. If needed she may end up with angio today if they were to find anything concerning on ekg / labs.  Dr Choi        Tried to reach patient by phone to give Dr. Choi's recommendations, left v/m requesting call back, direct call back number give. Also sent [employer] message with Dr. Choi's recommendations as well.       ADDENDUM 12:21PM  Patient returned call and verbalized understanding. She has a ride to [facility] today.

## 2024-06-12 NOTE — H&P
[facility]    History and Physical - Hospitalist Service       Date of Admission:  6/12/2024    Assessment & Plan      Michaela Dorman is a 73 year old female has medical history which includes bladder cancer status cystectomy with ileal conduit in 6/1/2021 and follows with [facility] urology team, HLD ,HTN , Carotid stenosis s/p CEA , coronary artery disease status (PCI of the OM1 with AMBREEN and 60% stenosis of the proximal LCx at bifurcation with OM1 WHICH is severely calcified) who has been having ongoing chest pressure since the time of the stent and was last seen in cardiology clinic on 5/24 presented to the hospital with the worsening chest pain and admitted for unstable angina    Unstable angina  Coronary artery disease status PCI of OM1 with known 60% lesion around the LCx at bifurcation with OM1  Hypertension  Hyperlipidemia  -Patient had angiogram done very recently and cardiac cath at that time did show that she has known residual lesion around 60% of the LCx at bifurcation of OM1 and patient was started on the medical therapy which she is compliant  -She has been having ongoing on and off chest discomfort and was seen in cardiology clinic on 5/24 with plan for outpatient angiogram which was scheduled for 6/13 but admitted to the hospital for unstable angina  -Initial troponin is only mildly elevated at 35 and she has prior troponin elevation  -EKG did not show any ST elevation  -Chest pain improved with 2 nitros and is at 1/10 which is baseline for her  -Cardiology consulted and discussed with Dr. PARADA and appreciate input and plan for cath in the morning  -N.p.o. after midnight  -Limited echo  -Continue with aspirin 81 mg, atorvastatin 80 mg, Coreg 6.25 twice daily(patient was earlier taking only daily but given elevated blood pressure we will switch it back to as prescribed), Plavix 75 mg, Zetia 10 mg and continue with heparin drip started in the ED and as needed  nitro    CKD III  -- Patient admitted with Cr at baseline    Hypothyroidism  -- Continue with PTA levothyroxine    Anxiety  Depression  -- Continue with PTA Lexapro 10 milligrams daily and as needed Ativan    History of bladder cancer status cystectomy with ileal conduit in 6/1/2021  -Noted    History of carotid stenosis status CEA  -Noted    History of breast cancers status surgery  History of back surgery  -Noted      GERD  -Continue with PTA PPI and Carafate as needed            Diet:  Cardiac diet and n.p.o. after midnight  DVT Prophylaxis: Heparin drip  Quionnes Catheter: Not present  Lines: PRESENT             Cardiac Monitoring: None  Code Status:  Full code    Clinically Significant Risk Factors Present on Admission           # Hypercalcemia: Highest Ca = 10.2 mg/dL in last 2 days, will monitor as appropriate       # Hypertension: Noted on problem list                        Disposition Plan     Medically Ready for Discharge: Anticipated Tomorrow           Manit MD Edward  Hospitalist Service  [facility]  Securely message with VANCL (more info)  Text page via MyEdu Paging/Directory     This note was completed in part using dictation via the Dragon voice recognition software. Some word and grammatical errors may occur and must be interpreted in the appropriate clinical context. If there are any questions pertaining to this issue, please contact me for further clarification.      I am on admitting shift and her care in the morning will be taken over by hospital medicine team  ______________________________________________________________________    Chief Complaint     Chest pain    History is obtained from the patient, ED physician, review of the EMR notes    History of Present Illness     Michaela Dorman is a 73 year old female has medical history which includes bladder cancer status cystectomy with ileal conduit in 6/1/2021 and follows with [facility] urology team, YANET  ,HTN , Carotid stenosis s/p CEA , coronary artery disease status (PCI of the OM1 with AMBREEN and 60% stenosis of the proximal LCx at bifurcation with OM1 WHICH is severely calcified) who has been having ongoing chest pressure since the time of the stent and was last seen in cardiology clinic on 5/24 and at that time there was plan to have outpatient angiogram done but presented to the ED with a chief complaint that her pain got worse than before and she took 2 nitroglycerin with relief and she called cardiology clinic and they recommended the patient to come to the ED and be started on heparin drip and evaluated.    She denies any recent fever, chills, nausea, vomiting, abdominal pain.  She denies any tingling or numbness anywhere else in the body. She does mention that for the past two weeks she has been getting more sob with minimal movement     Lab done in the ED showed sodium of 137, potassium of 4.5, chloride 101, bicarb of 21, BUN of 24.2, creatinine of 1.07 with GFR of 54 and calcium of 10.2 with anion gap of 15 and blood glucose of 89.  Troponin is mildly elevated at 35 and she has chronic elevation of troponins even in the past      WBC count of 8.2, hemoglobin of 11, MCV of 100 with platelet count of 274, INR is 0.93 with PTT of 27    Chest x-ray was done which does not show any focal consolidation, pleural effusion or pneumothorax and has right chest port and right shoulder arthroplasty    EKG showed normal sinus rhythm    In the ER as per discussion with the ED physician patient chest pain has improved significantly and is now at baseline which she described as 1/10 and no lightheadedness or dizziness.  Patient was started on heparin drip and will be admitted with cardiology consult.  Discussed with patient, daughter,  and granddaughter and also with the cardiology team      Past Medical History    Past Medical History:   Diagnosis Date    Acute kidney injury (H24)     Carotid stenosis, bilateral  L80%, R40% 09/02/2020    Central stenosis of spinal canal 12/01/2017    lumbar     DDD (degenerative disc disease), lumbar 12/01/2017    Depressive disorder, not elsewhere classified     Esophageal reflux     ETOH abuse     in remission    Foraminal stenosis of lumbar region 12/01/2017    Complete lumbar spine left at various degrees and to a lesser extent the right as well.    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    Subdural hematoma (H)     Thyroid disease     Urothelial carcinoma (H)        Past Surgical History   Past Surgical History:   Procedure Laterality Date    ARTHROPLASTY SHOULDER Right 11/17/2015    ARTHROSCOPY KNEE  6/7/2012    Procedure:ARTHROSCOPY KNEE; right knee arthroscopy with partial lateral menisectomy; Surgeon:DAMIÁN MCALLISTER; Location:[facility]    BIOPSY VAGINAL N/A 10/27/2021    Procedure: DISTAL URETHRECTOMY;  Surgeon: Leonardo Robles MD;  Location: [facility] OR    COLONOSCOPY N/A 12/22/2017    Procedure: COLONOSCOPY;  colonoscopy;  Surgeon: Chuck Chand MD;  Location: [facility]    CV CORONARY ANGIOGRAM N/A 4/23/2024    Procedure: Coronary Angiogram;  Surgeon: Leonardo Marinelli MD;  Location:  [facility]    CV INTRAVASULAR ULTRASOUND N/A 4/23/2024    Procedure: Intravascular Ultrasound;  Surgeon: Leonardo Marinelli MD;  Location:  [facility] CATH LAB    CV LEFT HEART CATH N/A 4/23/2024    Procedure: Left Heart Catheterization;  Surgeon: Leonardo Marinelli MD;  Location:  HE[facility] CARD[facility] CATH LAB    CV PCI STENT DRUG ELUTING N/A 4/23/2024    Procedure: Percutaneous Coronary Intervention Stent;  Surgeon: Leonardo Marinelli MD;  Location:  HE[facility] CARD[facility] CATH LAB    CYSTECTOMY BLADDER RADICAL, ILEAL DIVERSION, COMBINED N/A 6/1/2021     Procedure: RADICAL CYSTECTOMY  WITH ILEAL CONDUIT CREATION,BILATERAL PELVIC LYMPHADENECTOMY;  Surgeon: Leonardo Robles MD;  Location: [facility] OR    CYSTOSCOPY, RETROGRADES, COMBINED Bilateral 3/18/2021    Procedure: CYSTOSCOPY, WITH RETROGRADE PYELOGRAM;  Surgeon: Girish Jack MD;  Location: [facility] OR    CYSTOSCOPY, TRANSURETHRAL RESECTION (TUR) TUMOR BLADDER, COMBINED N/A 3/18/2021    Procedure: CYSTOSCOPY, WITH TRANSURETHRAL RESECTION BLADDER TUMOR;  Surgeon: Girish Jack MD;  Location: [facility] OR    ENDARTERECTOMY CAROTID Left 10/8/2020    Procedure: LEFT CAROTID ENDARTERECTOMY WITH EEG;  Surgeon: Lacho Jasmine MD;  Location: [facility] OR    ESOPHAGOSCOPY, GASTROSCOPY, DUODENOSCOPY (EGD), COMBINED N/A 6/20/2022    Procedure: ESOPHAGOGASTRODUODENOSCOPY, WITH BIOPSY;  Surgeon: Ramses Arenas MD;  Location: [facility]    EXCISE MASS AXILLA Left 9/16/2016    Procedure: EXCISE MASS AXILLA;  Surgeon: Keyon Blanco MD;  Location: [facility]    HC REMV CATARACT EXTRACAP,INSERT LENS, W/O ECP  6/25/2009    Right eye    INJECT EPIDURAL LUMBAR N/A 4/11/2019    Procedure: interlaminar epidual steroid injection lumbar 4-5;  Surgeon: Oskar Salvador MD;  [facility]    INJECT EPIDURAL LUMBAR Bilateral 9/12/2019    Procedure: lumbar 4-5 epidural interlaminar steroid injection;  Surgeon: Oskar Salvador MD;  Location: [facility]    INSERT PORT VASCULAR ACCESS Right 10/7/2016    Procedure: INSERT PORT VASCULAR ACCESS;  Surgeon: Keyon Blanco MD;  [facility]: [facility]    IR CHEST PORT PLACEMENT > 5 YRS OF AGE  4/16/2021    MASTECTOMY SIMPLE BILATERAL Bilateral 2/8/2017    Procedure: MASTECTOMY SIMPLE BILATERAL;  Surgeon: Keyon Blanco MD;  Location: [facility]    OPEN REDUCTION INTERNAL FIXATION FOOT Right 3/20/2015    Procedure: OPEN REDUCTION INTERNAL FIXATION FOOT;  Surgeon: Javi Herrmann DPM;  Location: [facility]    OPTICAL TRACKING SYSTEM FUSION SPINE POSTERIOR LUMBAR TWO LEVELS N/A 2/4/2020    Procedure: LUMBAR 2-SACRAL1  "TRANSFORMINAL INTERBODY FUSION;  Surgeon: Lenny Gomes MD;  Location: [facility]    REMOVE PORT VASCULAR ACCESS Right 2/14/2018    Procedure: REMOVE PORT VASCULAR ACCESS;  right intra jugular port removal;  Surgeon: Keyon Blanco MD;  Location: [facility]    SHOULDER SURGERY Right 11/2015    [facility] LIGATE FALLOPIAN TUBE      [facility] NONSPECIFIC PROCEDURE  1957    ankle fracture surgery       Prior to Admission Medications   Prior to Admission Medications   Prescriptions Last Dose Informant Patient Reported? Taking?   Cyanocobalamin (B-12 PO)  Self Yes No   Sig: Take 1 tablet by mouth daily   LORazepam (ATIVAN) 0.5 MG tablet   No No   Sig: Take 1 tablet (0.5 mg) by mouth every 4 hours as needed for anxiety, nausea or sleep   acetaminophen (TYLENOL) 500 MG tablet  Self Yes No   Sig: Take 1,000 mg by mouth 3 times daily as needed for mild pain (500MG X 2 = 1,000MG)   aspirin (ASA) 81 MG chewable tablet  Self No No   Sig: Take 1 tablet (81 mg) by mouth daily   atorvastatin (LIPITOR) 40 MG tablet   No No   Sig: Take 2 tablets (80 mg) by mouth daily   carvedilol (COREG) 6.25 MG tablet  Self No No   Sig: TAKE 1 TABLET (6.25 MG) BY MOUTH 2 TIMES DAILY (WITH MEALS) PLEASE SEND NEXT REFILL TO PRIMARY CARE AS NEPHROLOGY FOLLOW UP \"AS NEEDED\"   clopidogrel (PLAVIX) 75 MG tablet   No No   Sig: Take 1 tablet (75 mg) by mouth daily   escitalopram (LEXAPRO) 10 MG tablet   No No   Sig: Take 1 tablet (10 mg) by mouth daily   ezetimibe (ZETIA) 10 MG tablet   No No   Sig: Take 1 tablet (10 mg) by mouth daily   gabapentin (NEURONTIN) 300 MG capsule  Self No No   Sig: Take 2 capsules (600 mg) by mouth 3 times daily   levothyroxine (SYNTHROID/LEVOTHROID) 25 MCG tablet   No No   Sig: Take 1 tablet (25 mcg) by mouth daily   meclizine (ANTIVERT) 25 MG tablet  Self Yes No   Sig: Take 12.5 mg by mouth every morning 1/2 tablet (to mitigate nausea from morning meds)   nitroFURantoin macrocrystal-monohydrate (MACROBID) 100 MG capsule   No No   Sig: Take " 1 capsule (100 mg) by mouth 2 times daily   Patient not taking: Reported on 5/28/2024   nitroGLYcerin (NITROSTAT) 0.4 MG sublingual tablet   No No   Sig: For chest pain place 1 tablet under the tongue every 5 minutes for 3 doses. If symptoms persist 5 minutes after 2nd dose call 911.   oxyCODONE (ROXICODONE) 5 MG tablet   No No   Sig: TAKE 1 TABLET (5 MG) BY MOUTH EVERY 6 HOURS AS NEEDED FOR MODERATE TO SEVERE PAIN   pantoprazole (PROTONIX) 40 MG EC tablet   No No   Sig: Take 1 tablet (40 mg) by mouth daily   sucralfate (CARAFATE) 1 GM tablet   Yes No   Sig: Take 1 g by mouth 2 times daily as needed for nausea (usually takes before supper)      Facility-Administered Medications: None           Physical Exam   Vital Signs: Temp: 98.6  F (37  C) Temp src: Temporal BP: (!) 154/80 Pulse: 77   Resp: 18 SpO2: 95 % O2 Device: None (Room air)    Weight: 145 lbs 0 oz        General: Patient appears comfortable and in no acute distress.  HEENT: Head is atraumatic, normocephalic.    Neck: Neck is supple and No Lymphadenopathy   Respiratory: Lungs are clear to auscultation bilaterally with no wheeze or crackles and had right chest port  Cardiovascular: Regular rate , S1 and S2 normal with no murmer or rubs or gallops  Abdomen:   soft , non tender , non distended and bowel sound present and has ileostomy present  Skin: No skin rashes or lesions to inspection or palpation.  Neurologic: Higher functions are within normal limits. No obvious defects in speech, language and memory. No facial droop  Musculoskeletal: Normal Range of motion over upper and lower extremities bilaterally, scar conor of the right shoulder arthroplasty  Psychiatric: cooperative      Medical Decision Making           Time spent in care of patient is 76 minutes and I reviewed labs including CBC, basic metabolic panel, troponin, EKG, chest x-ray and discussed plan of care in detail with the patient, nursing staff, ED physician      Data     I have personally  reviewed the following data over the past 24 hrs:    8.2  \   11.0 (L)   / 274     137 101 24.2 (H) /  89   4.5 21 (L) 1.07 (H) \     Trop: 35 (H) BNP: N/A     INR:  0.93 PTT:  27   D-dimer:  N/A Fibrinogen:  N/A       Imaging results reviewed over the past 24 hrs:   Recent Results (from the past 24 hour(s))   XR Chest 2 Views    Narrative    CHEST TWO VIEWS 6/12/2024 3:11 PM     HISTORY: Chest pain.    COMPARISON: CT chest 5/24/2024.       Impression    IMPRESSION: No focal consolidation, pleural effusion or pneumothorax.  Cardiomediastinal silhouette is unremarkable. Right chest port and  right shoulder arthroplasty. Lumbar fusion hardware. Coronary stent.

## 2024-06-13 ENCOUNTER — APPOINTMENT (OUTPATIENT)
Dept: CARDIOLOGY | Facility: CLINIC | Age: 74
DRG: 281 | End: 2024-06-13
Attending: HOSPITALIST
Payer: MEDICARE

## 2024-06-13 LAB
ACT BLD: 312 SECONDS (ref 74–150)
ANION GAP SERPL CALCULATED.3IONS-SCNC: 12 MMOL/L (ref 7–15)
ANION GAP SERPL CALCULATED.3IONS-SCNC: 13 MMOL/L (ref 7–15)
APTT PPP: >240 SECONDS (ref 22–38)
ATRIAL RATE - MUSE: 68 BPM
BUN SERPL-MCNC: 23 MG/DL (ref 8–23)
BUN SERPL-MCNC: 23.9 MG/DL (ref 8–23)
CALCIUM SERPL-MCNC: 10.2 MG/DL (ref 8.8–10.2)
CALCIUM SERPL-MCNC: 10.4 MG/DL (ref 8.8–10.2)
CHLORIDE SERPL-SCNC: 103 MMOL/L (ref 98–107)
CHLORIDE SERPL-SCNC: 104 MMOL/L (ref 98–107)
CREAT SERPL-MCNC: 1 MG/DL (ref 0.51–0.95)
CREAT SERPL-MCNC: 1.01 MG/DL (ref 0.51–0.95)
DEPRECATED HCO3 PLAS-SCNC: 24 MMOL/L (ref 22–29)
DEPRECATED HCO3 PLAS-SCNC: 24 MMOL/L (ref 22–29)
DIASTOLIC BLOOD PRESSURE - MUSE: NORMAL MMHG
EGFRCR SERPLBLD CKD-EPI 2021: 58 ML/MIN/1.73M2
EGFRCR SERPLBLD CKD-EPI 2021: 59 ML/MIN/1.73M2
ERYTHROCYTE [DISTWIDTH] IN BLOOD BY AUTOMATED COUNT: 14.6 % (ref 10–15)
ERYTHROCYTE [DISTWIDTH] IN BLOOD BY AUTOMATED COUNT: 14.7 % (ref 10–15)
GLUCOSE SERPL-MCNC: 83 MG/DL (ref 70–99)
GLUCOSE SERPL-MCNC: 97 MG/DL (ref 70–99)
HCT VFR BLD AUTO: 33.9 % (ref 35–47)
HCT VFR BLD AUTO: 33.9 % (ref 35–47)
HGB BLD-MCNC: 10.8 G/DL (ref 11.7–15.7)
HGB BLD-MCNC: 11.1 G/DL (ref 11.7–15.7)
INR PPP: 1.02 (ref 0.85–1.15)
INTERPRETATION ECG - MUSE: NORMAL
LVEF ECHO: NORMAL
MCH RBC QN AUTO: 32.1 PG (ref 26.5–33)
MCH RBC QN AUTO: 32.9 PG (ref 26.5–33)
MCHC RBC AUTO-ENTMCNC: 31.9 G/DL (ref 31.5–36.5)
MCHC RBC AUTO-ENTMCNC: 32.7 G/DL (ref 31.5–36.5)
MCV RBC AUTO: 101 FL (ref 78–100)
MCV RBC AUTO: 101 FL (ref 78–100)
P AXIS - MUSE: 57 DEGREES
PLATELET # BLD AUTO: 271 10E3/UL (ref 150–450)
PLATELET # BLD AUTO: 275 10E3/UL (ref 150–450)
POTASSIUM SERPL-SCNC: 4.2 MMOL/L (ref 3.4–5.3)
POTASSIUM SERPL-SCNC: 4.4 MMOL/L (ref 3.4–5.3)
PR INTERVAL - MUSE: 164 MS
QRS DURATION - MUSE: 102 MS
QT - MUSE: 416 MS
QTC - MUSE: 442 MS
R AXIS - MUSE: 24 DEGREES
RBC # BLD AUTO: 3.36 10E6/UL (ref 3.8–5.2)
RBC # BLD AUTO: 3.37 10E6/UL (ref 3.8–5.2)
SODIUM SERPL-SCNC: 140 MMOL/L (ref 135–145)
SODIUM SERPL-SCNC: 140 MMOL/L (ref 135–145)
SYSTOLIC BLOOD PRESSURE - MUSE: NORMAL MMHG
T AXIS - MUSE: 43 DEGREES
UFH PPP CHRO-ACNC: 0.15 IU/ML
VENTRICULAR RATE- MUSE: 68 BPM
WBC # BLD AUTO: 5.6 10E3/UL (ref 4–11)
WBC # BLD AUTO: 6.5 10E3/UL (ref 4–11)

## 2024-06-13 PROCEDURE — 250N000013 HC RX MED GY IP 250 OP 250 PS 637: Performed by: NURSE PRACTITIONER

## 2024-06-13 PROCEDURE — 82374 ASSAY BLOOD CARBON DIOXIDE: CPT | Performed by: INTERNAL MEDICINE

## 2024-06-13 PROCEDURE — 258N000003 HC RX IP 258 OP 636: Mod: JZ | Performed by: INTERNAL MEDICINE

## 2024-06-13 PROCEDURE — 85347 COAGULATION TIME ACTIVATED: CPT

## 2024-06-13 PROCEDURE — 93306 TTE W/DOPPLER COMPLETE: CPT | Mod: 26 | Performed by: INTERNAL MEDICINE

## 2024-06-13 PROCEDURE — 99233 SBSQ HOSP IP/OBS HIGH 50: CPT | Mod: FS | Performed by: NURSE PRACTITIONER

## 2024-06-13 PROCEDURE — 85027 COMPLETE CBC AUTOMATED: CPT | Performed by: HOSPITALIST

## 2024-06-13 PROCEDURE — 93571 IV DOP VEL&/PRESS C FLO 1ST: CPT | Mod: 26 | Performed by: INTERNAL MEDICINE

## 2024-06-13 PROCEDURE — 99152 MOD SED SAME PHYS/QHP 5/>YRS: CPT | Performed by: INTERNAL MEDICINE

## 2024-06-13 PROCEDURE — 99153 MOD SED SAME PHYS/QHP EA: CPT | Performed by: INTERNAL MEDICINE

## 2024-06-13 PROCEDURE — 93458 L HRT ARTERY/VENTRICLE ANGIO: CPT | Performed by: INTERNAL MEDICINE

## 2024-06-13 PROCEDURE — 93572 IV DOP VEL&/PRESS C FLO EA: CPT | Mod: 26 | Performed by: INTERNAL MEDICINE

## 2024-06-13 PROCEDURE — 4A033BC MEASUREMENT OF ARTERIAL PRESSURE, CORONARY, PERCUTANEOUS APPROACH: ICD-10-PCS | Performed by: INTERNAL MEDICINE

## 2024-06-13 PROCEDURE — 85520 HEPARIN ASSAY: CPT | Performed by: HOSPITALIST

## 2024-06-13 PROCEDURE — 85730 THROMBOPLASTIN TIME PARTIAL: CPT | Performed by: INTERNAL MEDICINE

## 2024-06-13 PROCEDURE — 93458 L HRT ARTERY/VENTRICLE ANGIO: CPT | Mod: 26 | Performed by: INTERNAL MEDICINE

## 2024-06-13 PROCEDURE — 272N000001 HC OR GENERAL SUPPLY STERILE: Performed by: INTERNAL MEDICINE

## 2024-06-13 PROCEDURE — 4A023N7 MEASUREMENT OF CARDIAC SAMPLING AND PRESSURE, LEFT HEART, PERCUTANEOUS APPROACH: ICD-10-PCS | Performed by: INTERNAL MEDICINE

## 2024-06-13 PROCEDURE — 93799 UNLISTED CV SVC/PROCEDURE: CPT | Performed by: INTERNAL MEDICINE

## 2024-06-13 PROCEDURE — 93005 ELECTROCARDIOGRAM TRACING: CPT

## 2024-06-13 PROCEDURE — C1894 INTRO/SHEATH, NON-LASER: HCPCS | Performed by: INTERNAL MEDICINE

## 2024-06-13 PROCEDURE — 250N000011 HC RX IP 250 OP 636: Mod: JZ | Performed by: EMERGENCY MEDICINE

## 2024-06-13 PROCEDURE — 210N000002 HC R&B HEART CARE

## 2024-06-13 PROCEDURE — 85610 PROTHROMBIN TIME: CPT | Performed by: INTERNAL MEDICINE

## 2024-06-13 PROCEDURE — 250N000011 HC RX IP 250 OP 636: Mod: JZ | Performed by: INTERNAL MEDICINE

## 2024-06-13 PROCEDURE — C8929 TTE W OR WO FOL WCON,DOPPLER: HCPCS

## 2024-06-13 PROCEDURE — C1769 GUIDE WIRE: HCPCS | Performed by: INTERNAL MEDICINE

## 2024-06-13 PROCEDURE — 250N000013 HC RX MED GY IP 250 OP 250 PS 637: Performed by: HOSPITALIST

## 2024-06-13 PROCEDURE — 255N000002 HC RX 255 OP 636: Performed by: HOSPITALIST

## 2024-06-13 PROCEDURE — 99232 SBSQ HOSP IP/OBS MODERATE 35: CPT | Performed by: INTERNAL MEDICINE

## 2024-06-13 PROCEDURE — 85027 COMPLETE CBC AUTOMATED: CPT | Performed by: INTERNAL MEDICINE

## 2024-06-13 PROCEDURE — 36415 COLL VENOUS BLD VENIPUNCTURE: CPT | Performed by: HOSPITALIST

## 2024-06-13 PROCEDURE — C1887 CATHETER, GUIDING: HCPCS | Performed by: INTERNAL MEDICINE

## 2024-06-13 PROCEDURE — B2111ZZ FLUOROSCOPY OF MULTIPLE CORONARY ARTERIES USING LOW OSMOLAR CONTRAST: ICD-10-PCS | Performed by: INTERNAL MEDICINE

## 2024-06-13 PROCEDURE — 250N000013 HC RX MED GY IP 250 OP 250 PS 637: Performed by: INTERNAL MEDICINE

## 2024-06-13 PROCEDURE — 250N000009 HC RX 250: Performed by: INTERNAL MEDICINE

## 2024-06-13 PROCEDURE — 82374 ASSAY BLOOD CARBON DIOXIDE: CPT | Performed by: HOSPITALIST

## 2024-06-13 RX ORDER — NITROGLYCERIN 5 MG/ML
VIAL (ML) INTRAVENOUS
Status: DISCONTINUED | OUTPATIENT
Start: 2024-06-13 | End: 2024-06-13 | Stop reason: HOSPADM

## 2024-06-13 RX ORDER — IOPAMIDOL 755 MG/ML
INJECTION, SOLUTION INTRAVASCULAR
Status: DISCONTINUED | OUTPATIENT
Start: 2024-06-13 | End: 2024-06-13 | Stop reason: HOSPADM

## 2024-06-13 RX ORDER — HEPARIN SODIUM,PORCINE 10 UNIT/ML
5-10 VIAL (ML) INTRAVENOUS EVERY 24 HOURS
Status: DISCONTINUED | OUTPATIENT
Start: 2024-06-13 | End: 2024-06-15 | Stop reason: HOSPADM

## 2024-06-13 RX ORDER — ASPIRIN 81 MG/1
243 TABLET, CHEWABLE ORAL ONCE
Status: COMPLETED | OUTPATIENT
Start: 2024-06-13 | End: 2024-06-13

## 2024-06-13 RX ORDER — HEPARIN SODIUM,PORCINE 10 UNIT/ML
5-10 VIAL (ML) INTRAVENOUS
Status: DISCONTINUED | OUTPATIENT
Start: 2024-06-13 | End: 2024-06-15 | Stop reason: HOSPADM

## 2024-06-13 RX ORDER — HEPARIN SODIUM (PORCINE) LOCK FLUSH IV SOLN 100 UNIT/ML 100 UNIT/ML
5-10 SOLUTION INTRAVENOUS
Status: DISCONTINUED | OUTPATIENT
Start: 2024-06-13 | End: 2024-06-15 | Stop reason: HOSPADM

## 2024-06-13 RX ORDER — FENTANYL CITRATE 50 UG/ML
25 INJECTION, SOLUTION INTRAMUSCULAR; INTRAVENOUS
Status: DISCONTINUED | OUTPATIENT
Start: 2024-06-13 | End: 2024-06-15 | Stop reason: HOSPADM

## 2024-06-13 RX ORDER — SODIUM CHLORIDE 9 MG/ML
75 INJECTION, SOLUTION INTRAVENOUS CONTINUOUS
Status: ACTIVE | OUTPATIENT
Start: 2024-06-13 | End: 2024-06-13

## 2024-06-13 RX ORDER — LIDOCAINE 40 MG/G
CREAM TOPICAL
Status: DISCONTINUED | OUTPATIENT
Start: 2024-06-13 | End: 2024-06-13

## 2024-06-13 RX ORDER — POTASSIUM CHLORIDE 1500 MG/1
20 TABLET, EXTENDED RELEASE ORAL
Status: DISCONTINUED | OUTPATIENT
Start: 2024-06-13 | End: 2024-06-13 | Stop reason: HOSPADM

## 2024-06-13 RX ORDER — NALOXONE HYDROCHLORIDE 0.4 MG/ML
0.2 INJECTION, SOLUTION INTRAMUSCULAR; INTRAVENOUS; SUBCUTANEOUS
Status: DISCONTINUED | OUTPATIENT
Start: 2024-06-13 | End: 2024-06-13

## 2024-06-13 RX ORDER — SODIUM CHLORIDE 9 MG/ML
INJECTION, SOLUTION INTRAVENOUS CONTINUOUS
Status: DISCONTINUED | OUTPATIENT
Start: 2024-06-13 | End: 2024-06-13 | Stop reason: HOSPADM

## 2024-06-13 RX ORDER — LISINOPRIL 5 MG/1
5 TABLET ORAL DAILY
Status: DISCONTINUED | OUTPATIENT
Start: 2024-06-13 | End: 2024-06-15

## 2024-06-13 RX ORDER — ATROPINE SULFATE 0.1 MG/ML
0.5 INJECTION INTRAVENOUS
Status: ACTIVE | OUTPATIENT
Start: 2024-06-13 | End: 2024-06-13

## 2024-06-13 RX ORDER — ACETAMINOPHEN 325 MG/1
650 TABLET ORAL EVERY 4 HOURS PRN
Status: DISCONTINUED | OUTPATIENT
Start: 2024-06-13 | End: 2024-06-13

## 2024-06-13 RX ORDER — HEPARIN SODIUM 1000 [USP'U]/ML
INJECTION, SOLUTION INTRAVENOUS; SUBCUTANEOUS
Status: DISCONTINUED | OUTPATIENT
Start: 2024-06-13 | End: 2024-06-13 | Stop reason: HOSPADM

## 2024-06-13 RX ORDER — FENTANYL CITRATE 50 UG/ML
INJECTION, SOLUTION INTRAMUSCULAR; INTRAVENOUS
Status: DISCONTINUED | OUTPATIENT
Start: 2024-06-13 | End: 2024-06-13 | Stop reason: HOSPADM

## 2024-06-13 RX ORDER — VERAPAMIL HYDROCHLORIDE 2.5 MG/ML
INJECTION, SOLUTION INTRAVENOUS
Status: DISCONTINUED | OUTPATIENT
Start: 2024-06-13 | End: 2024-06-13 | Stop reason: HOSPADM

## 2024-06-13 RX ORDER — FLUMAZENIL 0.1 MG/ML
0.2 INJECTION, SOLUTION INTRAVENOUS
Status: ACTIVE | OUTPATIENT
Start: 2024-06-13 | End: 2024-06-13

## 2024-06-13 RX ORDER — NALOXONE HYDROCHLORIDE 0.4 MG/ML
0.4 INJECTION, SOLUTION INTRAMUSCULAR; INTRAVENOUS; SUBCUTANEOUS
Status: DISCONTINUED | OUTPATIENT
Start: 2024-06-13 | End: 2024-06-13

## 2024-06-13 RX ORDER — ASPIRIN 325 MG
325 TABLET ORAL ONCE
Status: COMPLETED | OUTPATIENT
Start: 2024-06-13 | End: 2024-06-13

## 2024-06-13 RX ADMIN — ACETAMINOPHEN 1000 MG: 500 TABLET, FILM COATED ORAL at 06:50

## 2024-06-13 RX ADMIN — HUMAN ALBUMIN MICROSPHERES AND PERFLUTREN 9 ML: 10; .22 INJECTION, SOLUTION INTRAVENOUS at 12:45

## 2024-06-13 RX ADMIN — ATORVASTATIN CALCIUM 80 MG: 80 TABLET, FILM COATED ORAL at 08:29

## 2024-06-13 RX ADMIN — CLOPIDOGREL BISULFATE 75 MG: 75 TABLET ORAL at 08:29

## 2024-06-13 RX ADMIN — PANTOPRAZOLE SODIUM 40 MG: 40 TABLET, DELAYED RELEASE ORAL at 08:29

## 2024-06-13 RX ADMIN — CARVEDILOL 6.25 MG: 6.25 TABLET, FILM COATED ORAL at 17:47

## 2024-06-13 RX ADMIN — MECLIZINE 12.5 MG: 12.5 TABLET ORAL at 08:29

## 2024-06-13 RX ADMIN — SODIUM CHLORIDE: 9 INJECTION, SOLUTION INTRAVENOUS at 08:31

## 2024-06-13 RX ADMIN — LEVOTHYROXINE SODIUM 25 MCG: 25 TABLET ORAL at 06:04

## 2024-06-13 RX ADMIN — CARVEDILOL 6.25 MG: 6.25 TABLET, FILM COATED ORAL at 08:29

## 2024-06-13 RX ADMIN — SENNOSIDES AND DOCUSATE SODIUM 2 TABLET: 50; 8.6 TABLET ORAL at 20:51

## 2024-06-13 RX ADMIN — GABAPENTIN 600 MG: 300 CAPSULE ORAL at 08:29

## 2024-06-13 RX ADMIN — EZETIMIBE 10 MG: 10 TABLET ORAL at 08:29

## 2024-06-13 RX ADMIN — OXYCODONE HYDROCHLORIDE 5 MG: 5 TABLET ORAL at 06:50

## 2024-06-13 RX ADMIN — LISINOPRIL 5 MG: 5 TABLET ORAL at 14:27

## 2024-06-13 RX ADMIN — ASPIRIN 325 MG ORAL TABLET 325 MG: 325 PILL ORAL at 08:29

## 2024-06-13 RX ADMIN — LORAZEPAM 0.5 MG: 0.5 TABLET ORAL at 08:29

## 2024-06-13 RX ADMIN — ACETAMINOPHEN 1000 MG: 500 TABLET, FILM COATED ORAL at 14:48

## 2024-06-13 RX ADMIN — ESCITALOPRAM OXALATE 5 MG: 5 TABLET, FILM COATED ORAL at 08:29

## 2024-06-13 RX ADMIN — GABAPENTIN 600 MG: 300 CAPSULE ORAL at 17:47

## 2024-06-13 RX ADMIN — HEPARIN SODIUM 600 UNITS/HR: 10000 INJECTION, SOLUTION INTRAVENOUS at 00:47

## 2024-06-13 RX ADMIN — GABAPENTIN 600 MG: 300 CAPSULE ORAL at 20:42

## 2024-06-13 RX ADMIN — LORAZEPAM 0.5 MG: 0.5 TABLET ORAL at 20:42

## 2024-06-13 RX ADMIN — SODIUM CHLORIDE 75 ML/HR: 9 INJECTION, SOLUTION INTRAVENOUS at 12:03

## 2024-06-13 ASSESSMENT — ACTIVITIES OF DAILY LIVING (ADL)
ADLS_ACUITY_SCORE: 24
ADLS_ACUITY_SCORE: 25
ADLS_ACUITY_SCORE: 24
DEPENDENT_IADLS:: INDEPENDENT
ADLS_ACUITY_SCORE: 24

## 2024-06-13 NOTE — PROVIDER NOTIFICATION
MD Notification    Notified Person: MD    Notified Person Name: Dr. Herrmann    Notification Date/Time: 0200 6/13    Notification Interaction: vocera    Purpose of Notification: pt has port can we put in order set for port    Orders Received: orders put in    Comments:

## 2024-06-13 NOTE — PLAN OF CARE
[id number]  Orientation: a&ox4  Activity: ind  Diet/BS Checks: npo @ midnight  Tele:  SR  IV Access/Drains: port infusing heparin 600 recheck @ 0630  Pain Management: c/o of headache tylenol given x1 effective. C/o back pain in the AM tylenol and oxy given  Abnormal VS/Results: VSS on RA  Bowel/Bladder: urostomy inplace w/ good output  Skin/Wounds: scattered bruising  Other Info: plan for cardiac cath at 1030  6/13

## 2024-06-13 NOTE — CONSULTS
Care Management Initial Consult    General Information  Assessment completed with: Patient, Children, Spouse or significant other, Yefri Alcaraz Josette  Type of CM/SW Visit: Initial Assessment    Primary Care Provider verified and updated as needed:     Readmission within the last 30 days: no previous admission in last 30 days      Reason for Consult: discharge planning  Advance Care Planning: Advance Care Planning Reviewed: no concerns identified          Communication Assessment  Patient's communication style: spoken language (English or Bilingual)    Hearing Difficulty or Deaf: no   Wear Glasses or Blind: no    Cognitive  Cognitive/Neuro/Behavioral: WDL                      Living Environment:   People in home: spouse  Lissa Asif  Current living Arrangements: house      Able to return to prior arrangements: yes       Family/Social Support:  Care provided by: self  Provides care for: no one  Marital Status:   Children,   Yefri       Description of Support System: Supportive, Involved    Support Assessment: Adequate family and caregiver support, Adequate social supports    Current Resources:   Patient receiving home care services: No     Community Resources: None  Equipment currently used at home: none  Supplies currently used at home: None    Employment/Financial:  Employment Status: retired        Financial Concerns: none   Referral to Financial Worker: No  Finance Comments: Medicare    Does the patient's insurance plan have a 3 day qualifying hospital stay waiver?  No    Lifestyle & Psychosocial Needs:  Social Determinants of Health     Food Insecurity: Low Risk  (4/25/2024)    Food Insecurity     Within the past 12 months, did you worry that your food would run out before you got money to buy more?: No     Within the past 12 months, did the food you bought just not last and you didn t have money to get more?: No   Depression: Not at risk (5/28/2024)    PHQ-2     PHQ-2 Score: 0   Housing  Stability: Low Risk  (4/25/2024)    Housing Stability     Do you have housing? : Yes     Are you worried about losing your housing?: No   Tobacco Use: Medium Risk (5/28/2024)    Patient History     Smoking Tobacco Use: Former     Smokeless Tobacco Use: Never     Passive Exposure: Never   Financial Resource Strain: Low Risk  (4/25/2024)    Financial Resource Strain     Within the past 12 months, have you or your family members you live with been unable to get utilities (heat, electricity) when it was really needed?: No   Alcohol Use: Not on file   Transportation Needs: Low Risk  (4/25/2024)    Transportation Needs     Within the past 12 months, has lack of transportation kept you from medical appointments, getting your medicines, non-medical meetings or appointments, work, or from getting things that you need?: No   Physical Activity: Sufficiently Active (4/25/2024)    Exercise Vital Sign     Days of Exercise per Week: 5 days     Minutes of Exercise per Session: 100 min   Interpersonal Safety: Low Risk  (4/25/2024)    Interpersonal Safety     Do you feel physically and emotionally safe where you currently live?: Yes     Within the past 12 months, have you been hit, slapped, kicked or otherwise physically hurt by someone?: No     Within the past 12 months, have you been humiliated or emotionally abused in other ways by your partner or ex-partner?: No   Stress: Stress Concern Present (4/25/2024)    [place] [employer] - Occupational Stress Questionnaire     Feeling of Stress : To some extent   Social Connections: Unknown (4/25/2024)    Social Connection and Isolation Panel [NHANES]     Frequency of Communication with Friends and Family: Not on file     Frequency of Social Gatherings with Friends and Family: Once a week     Attends Hinduism Services: Not on file     Active Member of Clubs or Organizations: Not on file     Attends Club or Organization Meetings: Not on file     Marital Status: Not on  "file   Health Literacy: Not on file       Functional Status:  Prior to admission patient needed assistance:   Dependent ADLs:: Independent  Dependent IADLs:: Independent       Mental Health Status:          Chemical Dependency Status:                Values/Beliefs:  Spiritual, Cultural Beliefs, Gnosticist Practices, Values that affect care: no               Additional Information:  Per care management consult for discharge planning, chart was reviewed. Patient is a pleasant 73 year old that was admitted to [facility] on 6/12/24. According to the H & P \"medical history which includes bladder cancer status cystectomy with ileal conduit in 6/1/2021 and follows with [facility] urology team, HLD ,HTN , Carotid stenosis s/p CEA , coronary artery disease status (PCI of the OM1 with AMBREEN and 60% stenosis of the proximal LCx at bifurcation with OM1 WHICH is severely calcified) who has been having ongoing chest pressure since the time of the stent and was last seen in cardiology clinic on 5/24 presented to the hospital with the worsening chest pain and admitted for unstable angina\".    Writer met with patient, spouse (Yefri) and Daughter (Joan) at bedside. Writer introduced self. Patient shares she and her  live in [place] on a [address]. They have 2 SURESH the home, however, recently got a ramp to go in. Patient shares she is independent with all I/ADL's at baseline. Patient does still drive. 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 She has a strong support from her spouse and daughter. Patient denied any needs at discharge and confirmed family would bring her home when medically ready.     Felicity Feldman, MSW, LGSW   Social Work    Health [facility]   "

## 2024-06-13 NOTE — PROGRESS NOTES
[facility] Cardiology Progress Note  Date of Service: 06/13/2024      Assessment & Plan   Michaela Dorman is a 73 year old female with known CAD and recent PCI who was admitted on 6/12/2024 with NSTEMI.     The patient underwent coronary angiogram today that showed residual moderate stenosis of the left circumflex distal to the previously placed stent that was hemodynamically insignificant by IFR (0.96).  The mid LAD lesion was negative by IFR (0.94), previously placed OM stents were patent.    Interval History:   Patient had episode of chest pain this morning, currently chest pain free.  R radial site intact without hematoma.  Hypertensive with SBP >180.     Assessment:   NSTEMI  Chest pain possibly 2/2 coronary vasospasms  CAD s/p recent PCI of OM1 with known residual LCx and moderate LAD disease  CKD, stage II with baseline cr 1.0-1.1  Carotid artery disease       Plan:  Start Imdur 30 mg daily.  Start lisinopril 5 mg daily to further optimize blood pressure.   Continue dual antiplatelet therapy with aspirin and Plavix.    Continue atorvastatin and ezetimibe.  Obtain echocardiogram.  Will arrange outpatient follow-up with cardiology at [facility].   Likely discharge tomorrow.       Geneva Mendiola, CNP  Pager:  [phone]  (7am - 5pm, M-F)      Physical Exam   Temp: 98  F (36.7  C) Temp src: Oral BP: (!) 146/78 Pulse: 60   Resp: 16 SpO2: 93 % O2 Device: None (Room air) Oxygen Delivery: 2 LPM  Vitals:    06/12/24 1442 06/13/24 0617   Weight: 65.8 kg (145 lb) 64.4 kg (142 lb)       Constitutional:   NAD   Skin:   Warm and dry   Head:   Nontraumatic   Neck:   no JVD   Lungs:   normal   Cardiovascular:   regular rate and rhythm   Abdomen:   Benign   Extremities and Back:   No edema   Neurological:   Grossly nonfocal       Medications   Current Facility-Administered Medications   Medication Dose Route Frequency Provider Last Rate Last Admin    Patient is already receiving  anticoagulation with heparin, enoxaparin (LOVENOX), warfarin (COUMADIN)  or other anticoagulant medication   Does not apply Continuous PRN Gino Leon MD        sodium chloride 0.9 % infusion  75 mL/hr Intravenous Continuous Juliette Stevens MD 75 mL/hr at 06/13/24 1203 75 mL/hr at 06/13/24 1203     Current Facility-Administered Medications   Medication Dose Route Frequency Provider Last Rate Last Admin    aspirin (ASA) chewable tablet 81 mg  81 mg Oral Daily Gino Leon MD        atorvastatin (LIPITOR) tablet 80 mg  80 mg Oral Daily Gino Leon MD   80 mg at 06/13/24 0829    carvedilol (COREG) tablet 6.25 mg  6.25 mg Oral BID w/meals Gino Leon MD   6.25 mg at 06/13/24 0829    clopidogrel (PLAVIX) tablet 75 mg  75 mg Oral Daily Gino Leon MD   75 mg at 06/13/24 0829    escitalopram (LEXAPRO) tablet 5 mg  5 mg Oral Daily Gino Leon MD   5 mg at 06/13/24 0829    ezetimibe (ZETIA) tablet 10 mg  10 mg Oral Daily Gino Leon MD   10 mg at 06/13/24 0829    gabapentin (NEURONTIN) capsule 600 mg  600 mg Oral TID Gino Leon MD   600 mg at 06/13/24 0829    heparin lock flush 10 unit/mL injection 5-10 mL  5-10 mL Intracatheter Q24H Miguelito Herrmann MD        heparin lock flush 100 unit/mL injection 5-10 mL  5-10 mL Intracatheter Q28 Days Miguelito Herrmann MD        levothyroxine (SYNTHROID/LEVOTHROID) tablet 25 mcg  25 mcg Oral QAM AC Gino Leon MD   25 mcg at 06/13/24 0604    meclizine (ANTIVERT) tablet 12.5 mg  12.5 mg Oral QAM Gino Leon MD   12.5 mg at 06/13/24 0829    pantoprazole (PROTONIX) EC tablet 40 mg  40 mg Oral Daily Gino Leon MD   40 mg at 06/13/24 0829    sodium chloride (PF) 0.9% PF flush 10-20 mL  10-20 mL Intracatheter Q28 Days Miguelito Herrmann MD        sodium chloride (PF) 0.9% PF flush 3 mL  3 mL Intracatheter Q8H Gino Leon MD           Data     Most Recent 3 CBC's:  Recent Labs   Lab Test 06/13/24  0856 06/13/24  0605 06/12/24  144    WBC 6.5 5.6 8.2   HGB 11.1* 10.8* 11.0*   * 101* 101*    271 274     Most Recent 3 BMP's:  Recent Labs   Lab Test 06/13/24  0856 06/13/24  0605 06/12/24  1447    140 137   POTASSIUM 4.4 4.2 4.5   CHLORIDE 103 104 101   CO2 24 24 21*   BUN 23.0 23.9* 24.2*   CR 1.01* 1.00* 1.07*   ANIONGAP 13 12 15   VANDANA 10.2 10.4* 10.2   GLC 97 83 89     Most Recent 3 Troponin's:No lab results found.  Most Recent 3 BNP's:No lab results found.  Most Recent Cholesterol Panel:  Recent Labs   Lab Test 04/29/24  0816   CHOL 194   LDL 85   HDL 88   TRIG 106         Medical Decision Making       30 MINUTES SPENT BY ME on the date of service doing chart review, history, exam, documentation & further activities per the note.          Clinically Significant Risk Factors Present on Admission           # Hypercalcemia: Highest Ca = 10.4 mg/dL in last 2 days, will monitor as appropriate      # Drug Induced Platelet Defect: home medication list includes an antiplatelet medication   # Hypertension: Noted on problem list

## 2024-06-14 ENCOUNTER — APPOINTMENT (OUTPATIENT)
Dept: CT IMAGING | Facility: CLINIC | Age: 74
DRG: 281 | End: 2024-06-14
Payer: MEDICARE

## 2024-06-14 ENCOUNTER — APPOINTMENT (OUTPATIENT)
Dept: MRI IMAGING | Facility: CLINIC | Age: 74
DRG: 281 | End: 2024-06-14
Attending: NURSE PRACTITIONER
Payer: MEDICARE

## 2024-06-14 LAB
ATRIAL RATE - MUSE: 56 BPM
DIASTOLIC BLOOD PRESSURE - MUSE: NORMAL MMHG
GLUCOSE BLDC GLUCOMTR-MCNC: 93 MG/DL (ref 70–99)
INTERPRETATION ECG - MUSE: NORMAL
P AXIS - MUSE: 56 DEGREES
PR INTERVAL - MUSE: 160 MS
QRS DURATION - MUSE: 90 MS
QT - MUSE: 448 MS
QTC - MUSE: 432 MS
R AXIS - MUSE: 18 DEGREES
SYSTOLIC BLOOD PRESSURE - MUSE: NORMAL MMHG
T AXIS - MUSE: 34 DEGREES
VENTRICULAR RATE- MUSE: 56 BPM

## 2024-06-14 PROCEDURE — 250N000011 HC RX IP 250 OP 636

## 2024-06-14 PROCEDURE — 99239 HOSP IP/OBS DSCHRG MGMT >30: CPT | Performed by: STUDENT IN AN ORGANIZED HEALTH CARE EDUCATION/TRAINING PROGRAM

## 2024-06-14 PROCEDURE — 93010 ELECTROCARDIOGRAM REPORT: CPT | Performed by: INTERNAL MEDICINE

## 2024-06-14 PROCEDURE — 999N000040 HC STATISTIC CONSULT NO CHARGE VASC ACCESS

## 2024-06-14 PROCEDURE — 258N000003 HC RX IP 258 OP 636: Mod: JZ

## 2024-06-14 PROCEDURE — 99291 CRITICAL CARE FIRST HOUR: CPT | Mod: FS | Performed by: NURSE PRACTITIONER

## 2024-06-14 PROCEDURE — 258N000003 HC RX IP 258 OP 636: Mod: JZ | Performed by: INTERNAL MEDICINE

## 2024-06-14 PROCEDURE — A9585 GADOBUTROL INJECTION: HCPCS | Performed by: HOSPITALIST

## 2024-06-14 PROCEDURE — 93005 ELECTROCARDIOGRAM TRACING: CPT

## 2024-06-14 PROCEDURE — 70553 MRI BRAIN STEM W/O & W/DYE: CPT | Mod: MG

## 2024-06-14 PROCEDURE — 99291 CRITICAL CARE FIRST HOUR: CPT

## 2024-06-14 PROCEDURE — 70450 CT HEAD/BRAIN W/O DYE: CPT | Mod: MA

## 2024-06-14 PROCEDURE — 210N000002 HC R&B HEART CARE

## 2024-06-14 PROCEDURE — 250N000013 HC RX MED GY IP 250 OP 250 PS 637: Performed by: NURSE PRACTITIONER

## 2024-06-14 PROCEDURE — 70496 CT ANGIOGRAPHY HEAD: CPT | Mod: MA

## 2024-06-14 PROCEDURE — 250N000011 HC RX IP 250 OP 636: Performed by: INTERNAL MEDICINE

## 2024-06-14 PROCEDURE — 99233 SBSQ HOSP IP/OBS HIGH 50: CPT | Mod: 25 | Performed by: NURSE PRACTITIONER

## 2024-06-14 PROCEDURE — 250N000013 HC RX MED GY IP 250 OP 250 PS 637: Performed by: HOSPITALIST

## 2024-06-14 PROCEDURE — 255N000002 HC RX 255 OP 636: Performed by: HOSPITALIST

## 2024-06-14 RX ORDER — IOPAMIDOL 755 MG/ML
117 INJECTION, SOLUTION INTRAVASCULAR ONCE
Status: COMPLETED | OUTPATIENT
Start: 2024-06-14 | End: 2024-06-14

## 2024-06-14 RX ORDER — LISINOPRIL 5 MG/1
5 TABLET ORAL DAILY
Qty: 30 TABLET | Refills: 0 | Status: SHIPPED | OUTPATIENT
Start: 2024-06-15 | End: 2024-06-15

## 2024-06-14 RX ORDER — ISOSORBIDE MONONITRATE 30 MG/1
30 TABLET, EXTENDED RELEASE ORAL DAILY
Status: DISCONTINUED | OUTPATIENT
Start: 2024-06-14 | End: 2024-06-15

## 2024-06-14 RX ORDER — ISOSORBIDE MONONITRATE 30 MG/1
30 TABLET, EXTENDED RELEASE ORAL DAILY
Qty: 30 TABLET | Refills: 0 | Status: SHIPPED | OUTPATIENT
Start: 2024-06-15 | End: 2024-06-21

## 2024-06-14 RX ORDER — GADOBUTROL 604.72 MG/ML
6 INJECTION INTRAVENOUS ONCE
Status: COMPLETED | OUTPATIENT
Start: 2024-06-14 | End: 2024-06-14

## 2024-06-14 RX ADMIN — SODIUM CHLORIDE 500 ML: 9 INJECTION, SOLUTION INTRAVENOUS at 22:48

## 2024-06-14 RX ADMIN — GABAPENTIN 600 MG: 300 CAPSULE ORAL at 21:44

## 2024-06-14 RX ADMIN — LISINOPRIL 5 MG: 5 TABLET ORAL at 08:08

## 2024-06-14 RX ADMIN — MECLIZINE 12.5 MG: 12.5 TABLET ORAL at 08:07

## 2024-06-14 RX ADMIN — GABAPENTIN 600 MG: 300 CAPSULE ORAL at 16:09

## 2024-06-14 RX ADMIN — PANTOPRAZOLE SODIUM 40 MG: 40 TABLET, DELAYED RELEASE ORAL at 08:07

## 2024-06-14 RX ADMIN — IOPAMIDOL 125 ML: 755 INJECTION, SOLUTION INTRAVENOUS at 14:32

## 2024-06-14 RX ADMIN — SODIUM CHLORIDE 500 ML: 9 INJECTION, SOLUTION INTRAVENOUS at 14:03

## 2024-06-14 RX ADMIN — HEPARIN SODIUM (PORCINE) LOCK FLUSH IV SOLN 100 UNIT/ML 5 ML: 100 SOLUTION at 13:01

## 2024-06-14 RX ADMIN — GABAPENTIN 600 MG: 300 CAPSULE ORAL at 08:06

## 2024-06-14 RX ADMIN — CARVEDILOL 6.25 MG: 6.25 TABLET, FILM COATED ORAL at 17:35

## 2024-06-14 RX ADMIN — LORAZEPAM 0.5 MG: 0.5 TABLET ORAL at 16:09

## 2024-06-14 RX ADMIN — CLOPIDOGREL BISULFATE 75 MG: 75 TABLET ORAL at 08:07

## 2024-06-14 RX ADMIN — ESCITALOPRAM OXALATE 5 MG: 5 TABLET, FILM COATED ORAL at 08:08

## 2024-06-14 RX ADMIN — Medication 5 ML: at 04:49

## 2024-06-14 RX ADMIN — CARVEDILOL 6.25 MG: 6.25 TABLET, FILM COATED ORAL at 08:07

## 2024-06-14 RX ADMIN — ACETAMINOPHEN 1000 MG: 500 TABLET, FILM COATED ORAL at 08:12

## 2024-06-14 RX ADMIN — OXYCODONE HYDROCHLORIDE 5 MG: 5 TABLET ORAL at 08:12

## 2024-06-14 RX ADMIN — LEVOTHYROXINE SODIUM 25 MCG: 25 TABLET ORAL at 08:08

## 2024-06-14 RX ADMIN — ATORVASTATIN CALCIUM 80 MG: 80 TABLET, FILM COATED ORAL at 08:07

## 2024-06-14 RX ADMIN — ACETAMINOPHEN 1000 MG: 500 TABLET, FILM COATED ORAL at 21:44

## 2024-06-14 RX ADMIN — ACETAMINOPHEN 1000 MG: 500 TABLET, FILM COATED ORAL at 17:38

## 2024-06-14 RX ADMIN — GADOBUTROL 6 ML: 604.72 INJECTION INTRAVENOUS at 18:39

## 2024-06-14 RX ADMIN — EZETIMIBE 10 MG: 10 TABLET ORAL at 08:08

## 2024-06-14 RX ADMIN — ASPIRIN 81 MG CHEWABLE TABLET 81 MG: 81 TABLET CHEWABLE at 08:06

## 2024-06-14 RX ADMIN — ISOSORBIDE MONONITRATE 30 MG: 30 TABLET, EXTENDED RELEASE ORAL at 12:18

## 2024-06-14 ASSESSMENT — ACTIVITIES OF DAILY LIVING (ADL)
ADLS_ACUITY_SCORE: 25

## 2024-06-14 NOTE — PLAN OF CARE
Neuro: A&O x4, neuros intact  Tele/cardiac: SR  Respiration: RA  Activity: SBA  Pain: Tylenol for headache  Drips: PIV SL  Drains/tubes: None  Skin: intact  GI/:  Aggression color: green  Isolation: none  Plan: stroke  work up tonight,

## 2024-06-14 NOTE — PROGRESS NOTES
Brief Provider Note    Made aware of code stroke after hypotensive episode with persistent vision changes. Patient was started on lisinopril and imdur within the last day which may have contributed to orthostatic hypotension. Discharge will be cancelled. Plan to obtain stroke work up and monitor vitals overnight. Holding lisinopril and imdur. Will consider resumption of either imdur or lisinopril tomorrow AM. Could also consider isordil 10 mg tid before putting back on imdur.    Jonatan Mason, DO

## 2024-06-14 NOTE — DISCHARGE SUMMARY
[facility]  Hospitalist Discharge Summary      Date of Admission:  6/12/2024  Date of Discharge:  6/14/2024  Discharging Provider: Jonatan Mason DO  Discharge Service: Hospitalist Service    Discharge Diagnoses     Can act as a progress note if patient does not discharge     Unstable angina, resolved  Coronary artery disease status PCI of OM1 with known 60% lesion around the LCx at bifurcation with OM1  Hypertension  Hyperlipidemia  Hypothyroidism  Anxiety  Depression  History of bladder cancer status cystectomy with ileal conduit in 6/1/2021  History of carotid stenosis status CEA   History of breast cancers status surgery  History of back surgery  GERD    Clinically Significant Risk Factors          Follow-ups Needed After Discharge     Unresulted Labs Ordered in the Past 30 Days of this Admission       No orders found from 5/13/2024 to 6/13/2024.          Discharge Disposition   Discharged to home  Condition at discharge: Stable    Hospital Course     Michaela Dorman is a 73 year old female has medical history which includes bladder cancer status cystectomy with ileal conduit in 6/1/2021 and follows with [facility] urology team, HLD ,HTN , Carotid stenosis s/p CEA , coronary artery disease status (PCI of the OM1 with AMBREEN and 60% stenosis of the proximal LCx at bifurcation with OM1 WHICH is severely calcified) who has been having ongoing chest pressure since the time of the stent and was last seen in cardiology clinic on 5/24 presented to the hospital with the worsening chest pain and admitted for unstable angina.     Unstable angina  Coronary artery disease status PCI of OM1 with known 60% lesion around the LCx at bifurcation with OM1  Hypertension  Hyperlipidemia  -Patient had angiogram done very recently and cardiac cath at that time did show that she has known residual lesion around 60% of the LCx at bifurcation of OM1 and patient was started on the medical therapy  which she is compliant  -She has been having ongoing on and off chest discomfort and was seen in cardiology clinic on 5/24 with plan for outpatient angiogram which was scheduled for 6/13 but admitted to the hospital for unstable angina  -Initial troponin is only mildly elevated at 35 and she has prior troponin elevation  -EKG did not show any ST elevation  -Chest pain improved with 2 nitros   -Cardiology consulted    -TTE shows borderline LVH, normal LVEF  -Continue with aspirin 81 mg, atorvastatin 80 mg, Coreg 6.25 twice daily(patient was earlier taking only daily but given elevated blood pressure we will switch it back to as prescribed), Plavix 75 mg, Zetia 10 mg    -started on imdur 30 mg daily and lisinopril 5 mg daily by cardiology   - Chest pain free and improved blood pressure prior to discharge      Hypothyroidism  -Continue with PTA levothyroxine     Anxiety  Depression  -Continue with PTA Lexapro 10 milligrams daily and as needed Ativan     History of bladder cancer status cystectomy with ileal conduit in 6/1/2021  -Noted     History of carotid stenosis status CEA  -Noted     History of breast cancers status surgery  History of back surgery  -Noted        GERD  -Continue with PTA PPI and Carafate as needed    Consultations This Hospital Stay   PHARMACY IP CONSULT  CARDIOLOGY IP CONSULT  CARE MANAGEMENT / SOCIAL WORK IP CONSULT  PHARMACY IP CONSULT  PHARMACY IP CONSULT  SMOKING CESSATION PROGRAM IP CONSULT    Code Status   Full Code    Time Spent on this Encounter   I, Jonatan Mason DO, personally saw the patient today and spent greater than 30 minutes discharging this patient.       Jonatan Mason DO  [facility]  [address] [place] [address] [address]VE, [address]  [facility] [address]  Phone: [phone]  ______________________________________________________________________    Physical Exam   Vital Signs: Temp: 98.3  F (36.8  C) Temp src: Oral BP: 136/81 Pulse: 71   Resp: 18 SpO2: 96  % O2 Device: None (Room air)    Weight: 141 lbs 11.2 oz    Constitutional: Awake, alert, cooperative, no apparent distress  Respiratory:     Clear to auscultation bilaterally, no crackles or wheezing  Cardiovascular: Regular rate and rhythm, normal S1 and S2  GI: Normal bowel sounds, soft, non-distended, non-tender, ileal conduit with yellow urine in bag, dressing intact   Skin/Integumen: No rashes, no cyanosis, no edema  Other:  Normal mood and affect    Primary Care Physician   Phani Albright    Discharge Orders      Primary Care - Care Coordination Referral      Medication Instructions - Anticoagulants    Do NOT stop your aspirin or platelet inhibitor unless directed by your Cardiologist.  These medications help to prevent platelets in your blood from sticking together and forming a clot.  Examples of these medications are:  Ticagrelor (Brilinta), Clopidigrel (Plavix), Prasugrel (Effient)     When to call - Contact the Heart Clinic    You may experience symptoms that require follow-up before your scheduled appointment. Contact the Heart Clinic if you develop: Fever over 100.4o Fahrenheit, that lasts more than one day; Redness, heat, or pus at the puncture site; Change in color or temperature in your hand or arm.     When to call - Reasons to Call 911    If your wrist puncture site starts bleeding after discharge, sit down and apply firm pressure with your thumb against the puncture site and fingers against the back of the wrist for 10 minutes. If the bleeding stops, continue to rest, keeping your wrist still for 2 hours. Notify your doctor as soon as possible.  IF BLEEDING DOES NOT STOP OR THERE IS A LARGER AMOUNT OF BLEEDING OR SPURTING CALL 9-1-1 immediately.DO NOT drive yourself to the hospital.     Precautions - Lifting    DO NOT lift more than 5 pounds with affected arm for 48 hours     Precautions - Household Activities    Avoid excessive bending or movement of your wrist for 72 hours.  Do not subject  hand/arm to any forceful movements for 24 hours, such as supporting weight when rising from a chair or bed.     Remove the band-aid on the puncture site after 24 hours and leave open to air. If minor oozing, you may apply a band-aid and remove after 12 hours.     Precautions - Active Sports Activities    DO NOT engage in vigorous exercise using your affected arm for 3 days after discharge.  This includes golf, tennis or swimming.     Precautions - Operating yard equipment or vehicles    Do not operate a chainsaw, lawnmower, motorcycle, or all-terrain vehicle for 48 hours after the procedure.     Precautions - Elective Dental Work    NO elective dental work for 6 weeks after receiving a stent.     Comfort and Pain Management - Bruising after Surgery    Expect mild tingling of hand and tenderness at the wrist puncture site for up to 3 days. You may take Tylenol or a pain medicine recommended by your doctor.     Activity - Cardiac Rehab    You are encouraged to enroll in an Outpatient Cardiac Rehab program after discharge from the hospital.  Our Cardiac Rehab staff may visit briefly with you while you're in the hospital.  If they miss you, someone will contact you after you are home.     Return to Driving    Driving is NOT permitted for 24 hours after surgery     Return to work    You may return to work after 72 hours if you are feeling well and your job does not involve heavy lifting.     Shower / Bathing    You may shower on the day after your procedure.  DO NOT soak of wrist with the puncture site in water for 3 days to prevent infection. DO NOT take a tub bath or wash dishes for 3 days after the procedure     Dressing Removal    Remove the band-aid on the puncture site after 24 hours and leave open to air. If minor oozing, you may apply a band-aid and remove after 12 hours     Reason for your hospital stay    You were admitted to the hospital for chest pain     Follow-up and recommended labs and tests     New  medications were started to help prevent chest pain and control blood pressure. These medications are lisinopril and imdur. Please follow up with cardiology as recommended. Please follow up with your primary care provider in 1-2 weeks for a recheck or sooner if needed. Return for recurrent or worsening chest pain, dyspnea, nausea, diaphoresis, or syncope.     Activity    Your activity upon discharge: activity as tolerated     Diet    Follow this diet upon discharge: Orders Placed This Encounter      Low Saturated Fat Na <2400 mg       Significant Results and Procedures   Most Recent 3 CBC's:  Recent Labs   Lab Test 06/13/24  0856 06/13/24  0605 06/12/24  1447   WBC 6.5 5.6 8.2   HGB 11.1* 10.8* 11.0*   * 101* 101*    271 274     Most Recent 3 BMP's:  Recent Labs   Lab Test 06/13/24  0856 06/13/24  0605 06/12/24  1447    140 137   POTASSIUM 4.4 4.2 4.5   CHLORIDE 103 104 101   CO2 24 24 21*   BUN 23.0 23.9* 24.2*   CR 1.01* 1.00* 1.07*   ANIONGAP 13 12 15   VANDANA 10.2 10.4* 10.2   GLC 97 83 89     Most Recent 2 LFT's:  Recent Labs   Lab Test 04/29/24  0816 03/20/24  1306 01/28/24  1521   AST  --  24 28   ALT 36 20 27   ALKPHOS  --  54 64   BILITOTAL  --  0.3 0.2   ,   Results for orders placed or performed during the hospital encounter of 06/12/24   XR Chest 2 Views    Narrative    CHEST TWO VIEWS 6/12/2024 3:11 PM     HISTORY: Chest pain.    COMPARISON: CT chest 5/24/2024.       Impression    IMPRESSION: No focal consolidation, pleural effusion or pneumothorax.  Cardiomediastinal silhouette is unremarkable. Right chest port and  right shoulder arthroplasty. Lumbar fusion hardware. Coronary stent.    ELLIE NAIR MD         SYSTEM ID:  [id number]   Echocardiogram Complete     Value    LVEF  55-60%    Narrative    [id number]  [id number]  [id number]  [id number]^FRANDY^MANIT     [facility]  Echocardiography Laboratory  [address] [place] [address]     Name: CONY  NICHOLAS PRESCOTT  MRN: [id number]  : 1950  Study Date: 2024 12:12 PM  Age: 73 yrs  Gender: Female  Patient Location: [facility]  Reason For Study: Chest Pain  Ordering Physician: EVERT WISE  Referring Physician: ELY WISEMAN  Performed By: Viktoria Wells     BSA: 1.7 m2  Height: 65 in  Weight: 145 lb  HR: 55  BP: 146/78 mmHg  ______________________________________________________________________________  Procedure  Complete Portable Echo Adult. Optison (NDC #[id number]) given intravenously.  ______________________________________________________________________________  Interpretation Summary     See stress echo result 2024  There is borderline concentric left ventricular hypertrophy.  Left ventricular systolic function is normal.  Normal left ventricular wall motion  ______________________________________________________________________________  Left Ventricle  The left ventricle is normal in size. There is borderline concentric left  ventricular hypertrophy. Left ventricular systolic function is normal. The  visual ejection fraction is 55-60%. Grade I or early diastolic dysfunction.  Normal left ventricular wall motion. There is no thrombus seen in the left  ventricle.     Right Ventricle  The right ventricle is normal in size and function.     Atria  Normal left atrial size. Right atrial size is normal.     Mitral Valve  The mitral valve leaflets appear normal. There is no evidence of stenosis,  fluttering, or prolapse.     Tricuspid Valve  Normal tricuspid valve. Right ventricular systolic pressure could not be  approximated due to inadequate tricuspid regurgitation.     Aortic Valve  The aortic valve is trileaflet.     Pulmonic Valve  The pulmonic valve is not well seen, but is grossly normal.     Vessels  The aortic root is normal size. IVC diameter <2.1 cm collapsing >50% with  sniff suggests a normal RA pressure of 3 mmHg.     Pericardium  The pericardium appears normal.     Rhythm  Sinus rhythm was  noted.  ______________________________________________________________________________  MMode/2D Measurements & Calculations  IVSd: 1.1 cm     LVIDd: 4.3 cm  LVPWd: 1.3 cm  LV mass(C)d: 177.3 grams  LV mass(C)dI: 102.7 grams/m2  Ao root diam: 3.5 cm  LA dimension: 3.2 cm  asc Aorta Diam: 3.4 cm  LA/Ao: 0.90  LVOT diam: 1.9 cm  LVOT area: 2.8 cm2  Ao root diam index Ht(cm/m): 2.1  Ao root diam index BSA (cm/m2): 2.0  Asc Ao diam index BSA (cm/m2): 2.0  Asc Ao diam index Ht(cm/m): 2.1  LA Volume (BP): 46.9 ml     LA Volume Index (BP): 27.1 ml/m2  RV Base: 2.6 cm  RWT: 0.58  TAPSE: 2.2 cm     Doppler Measurements & Calculations  MV E max luis: 39.1 cm/sec  MV A max luis: 63.2 cm/sec  MV E/A: 0.62  MV dec time: 0.28 sec  E/E' av.4  Lateral E/e': 6.8  Medial E/e': 8.1  RV S Luis: 9.0 cm/sec     ______________________________________________________________________________  Report approved by: Mika Aguirre 2024 01:53 PM         Cardiac Catheterization    Narrative      Mid LAD lesion is 50% stenosed.    Prox Cx lesion is 60% stenosed.    1st Mrg-2 lesion is 40% stenosed.    Nonobstructive CAD.  Patent stents in the LCx.  Residual moderate stenosis in LCx and stenosis   distal to the previously placed stents was evaluated with iFR which was   not hemodynamically significant at 0.96.  Moderate LAD stenosis evalauted with iFR and the LAD stenosis was not   hemodynamically significant.  iFR 0.94.  LVEDP relatively low, 0.94.         *Note: Due to a large number of results and/or encounters for the requested time period, some results have not been displayed. A complete set of results can be found in Results Review.       Discharge Medications   Current Discharge Medication List        START taking these medications    Details   isosorbide mononitrate (IMDUR) 30 MG 24 hr tablet Take 1 tablet (30 mg) by mouth daily  Qty: 30 tablet, Refills: 0    Associated Diagnoses: Unstable angina (H); Coronary artery disease  "involving native coronary artery of native heart without angina pectoris      lisinopril (ZESTRIL) 5 MG tablet Take 1 tablet (5 mg) by mouth daily  Qty: 30 tablet, Refills: 0    Associated Diagnoses: Hypertension goal BP (blood pressure) < 140/90           CONTINUE these medications which have NOT CHANGED    Details   acetaminophen (TYLENOL) 500 MG tablet Take 1,000 mg by mouth 3 times daily as needed for mild pain (500MG X 2 = 1,000MG)      aspirin (ASA) 81 MG chewable tablet Take 1 tablet (81 mg) by mouth daily  Qty: 100 tablet, Refills: 3    Associated Diagnoses: Carotid artery plaque, bilateral; Carotid stenosis, bilateral; Right internal carotid artery aneurysm      atorvastatin (LIPITOR) 80 MG tablet Take 80 mg by mouth daily      carvedilol (COREG) 6.25 MG tablet TAKE 1 TABLET (6.25 MG) BY MOUTH 2 TIMES DAILY (WITH MEALS) PLEASE SEND NEXT REFILL TO PRIMARY CARE AS NEPHROLOGY FOLLOW UP \"AS NEEDED\"  Qty: 90 tablet, Refills: 0    Comments: Patient enrolled in our Rx Med Sync service to improve adherence. We are requesting a refill authorization in advance to ensure an active prescription is on file.  Associated Diagnoses: Hypertension goal BP (blood pressure) < 140/90      clopidogrel (PLAVIX) 75 MG tablet Take 1 tablet (75 mg) by mouth daily  Qty: 90 tablet, Refills: 3    Associated Diagnoses: Abnormal cardiovascular stress test; Coronary artery disease involving native coronary artery of native heart without angina pectoris      Cyanocobalamin (B-12 PO) Take 1 tablet by mouth daily      escitalopram (LEXAPRO) 10 MG tablet Take 1 tablet (10 mg) by mouth daily  Qty: 90 tablet, Refills: 3    Associated Diagnoses: Adjustment disorder with depressed mood      ezetimibe (ZETIA) 10 MG tablet Take 1 tablet (10 mg) by mouth daily  Qty: 90 tablet, Refills: 3    Associated Diagnoses: Coronary artery calcification; Hyperlipidemia LDL goal <130      gabapentin (NEURONTIN) 300 MG capsule Take 2 capsules (600 mg) by mouth 3 " times daily  Qty: 180 capsule, Refills: 5    Associated Diagnoses: Neuropathy      levothyroxine (SYNTHROID/LEVOTHROID) 25 MCG tablet Take 1 tablet (25 mcg) by mouth daily  Qty: 90 tablet, Refills: 1    Associated Diagnoses: Hypothyroidism due to acquired atrophy of thyroid      LORazepam (ATIVAN) 0.5 MG tablet Take 1 tablet (0.5 mg) by mouth every 4 hours as needed for anxiety, nausea or sleep  Qty: 20 tablet, Refills: 0    Associated Diagnoses: Adjustment disorder with depressed mood      meclizine (ANTIVERT) 25 MG tablet Take 12.5 mg by mouth every morning 1/2 tablet (to mitigate nausea from morning meds)      nitroGLYcerin (NITROSTAT) 0.4 MG sublingual tablet For chest pain place 1 tablet under the tongue every 5 minutes for 3 doses. If symptoms persist 5 minutes after 2nd dose call 911.  Qty: 30 tablet, Refills: 0    Associated Diagnoses: CAD (coronary artery disease)      oxyCODONE (ROXICODONE) 5 MG tablet TAKE 1 TABLET (5 MG) BY MOUTH EVERY 6 HOURS AS NEEDED FOR MODERATE TO SEVERE PAIN  Qty: 30 tablet, Refills: 0    Associated Diagnoses: Lumbar radiculopathy; Chronic pain syndrome; DDD (degenerative disc disease), lumbar; Foraminal stenosis of lumbar region      pantoprazole (PROTONIX) 40 MG EC tablet Take 1 tablet (40 mg) by mouth daily  Qty: 90 tablet, Refills: 3    Associated Diagnoses: Gastroesophageal reflux disease, unspecified whether esophagitis present      prochlorperazine (COMPAZINE) 10 MG tablet Take 5 mg by mouth every 6 hours as needed for nausea or vomiting      sucralfate (CARAFATE) 1 GM tablet Take 1 g by mouth 2 times daily as needed for nausea (usually takes before supper)           Allergies   Allergies   Allergen Reactions    Oxybutynin      Patient reports symptoms of nausea and mind fogginess

## 2024-06-14 NOTE — PROGRESS NOTES
[facility] Cardiology Progress Note  Date of Service: 06/14/2024      Assessment & Plan   Michaela Dorman is a 73 year old female with known CAD and recent PCI who was admitted on 6/12/2024 with NSTEMI.     The patient underwent coronary angiogram today that showed residual moderate stenosis of the left circumflex distal to the previously placed stent that was hemodynamically insignificant by IFR (0.96).  The mid LAD lesion was negative by IFR (0.94), previously placed OM stents were patent.    Echocardiogram shows preserved LV function with EF 55-60%, borderline LVH, no wall motion abnormalities.     The patient is currently chest pain free. Her BP is much improved with the addition of lisinopril.     Assessment:   NSTEMI  CAD s/p recent PCI of OM1 with known residual LCx and moderate LAD disease not hemodynamically significant per IFR  CKD, stage II with baseline cr 1.0-1.1  Carotid artery disease       Plan:  Continue DAPT with ASA and plavix for at least one full year.   Continue Imdur 30 mg daily, carvedilol 6.25 mg BID, lisinopril 5 mg daily, ezetimibe 10 mg daily, and atorvastatin 40 mg daily.   Outpatient cardiac rehab.   Follow-up with Anais Payne PA-C in [place] on 7/9/24.   Okay to discharge from a cardiology standpoint.       Geneva Mendiola, CNP  Pager:  [phone]  (7am - 5pm, M-F)      Physical Exam   Temp: 98.3  F (36.8  C) Temp src: Oral BP: 136/81 Pulse: 71   Resp: 18 SpO2: 96 % O2 Device: None (Room air) Oxygen Delivery: 2 LPM  Vitals:    06/12/24 1442 06/13/24 0617 06/14/24 0623   Weight: 65.8 kg (145 lb) 64.4 kg (142 lb) 64.3 kg (141 lb 11.2 oz)       Constitutional:   NAD   Skin:   Warm and dry   Head:   Nontraumatic   Neck:   no JVD   Lungs:   normal   Cardiovascular:   regular rate and rhythm   Abdomen:   Benign   Extremities and Back:   No edema   Neurological:   Grossly nonfocal       Medications   Current Facility-Administered Medications    Medication Dose Route Frequency Provider Last Rate Last Admin    Patient is already receiving anticoagulation with heparin, enoxaparin (LOVENOX), warfarin (COUMADIN)  or other anticoagulant medication   Does not apply Continuous PRN Gino Leon MD         Current Facility-Administered Medications   Medication Dose Route Frequency Provider Last Rate Last Admin    aspirin (ASA) chewable tablet 81 mg  81 mg Oral Daily Gino Leon MD   81 mg at 06/14/24 0806    atorvastatin (LIPITOR) tablet 80 mg  80 mg Oral Daily Gino Leon MD   80 mg at 06/14/24 0807    carvedilol (COREG) tablet 6.25 mg  6.25 mg Oral BID w/meals Gino Leon MD   6.25 mg at 06/14/24 0807    clopidogrel (PLAVIX) tablet 75 mg  75 mg Oral Daily Gino Leon MD   75 mg at 06/14/24 0807    escitalopram (LEXAPRO) tablet 5 mg  5 mg Oral Daily Gino Leon MD   5 mg at 06/14/24 0808    ezetimibe (ZETIA) tablet 10 mg  10 mg Oral Daily Gino Leon MD   10 mg at 06/14/24 0808    gabapentin (NEURONTIN) capsule 600 mg  600 mg Oral TID Gino Leon MD   600 mg at 06/14/24 0806    heparin lock flush 10 unit/mL injection 5-10 mL  5-10 mL Intracatheter Q24H Miguelito Herrmann MD   5 mL at 06/14/24 0449    heparin lock flush 100 unit/mL injection 5-10 mL  5-10 mL Intracatheter Q28 Days Miguelito Herrmann MD        levothyroxine (SYNTHROID/LEVOTHROID) tablet 25 mcg  25 mcg Oral QAM AC Gino Leon MD   25 mcg at 06/14/24 0808    lisinopril (ZESTRIL) tablet 5 mg  5 mg Oral Daily Geneva Mendiola CNP   5 mg at 06/14/24 0808    meclizine (ANTIVERT) tablet 12.5 mg  12.5 mg Oral QAM Gino Leon MD   12.5 mg at 06/14/24 0807    pantoprazole (PROTONIX) EC tablet 40 mg  40 mg Oral Daily Gino Leon MD   40 mg at 06/14/24 0807    sodium chloride (PF) 0.9% PF flush 10-20 mL  10-20 mL Intracatheter Q28 Days Miguelito Herrmann MD        sodium chloride (PF) 0.9% PF flush 3 mL  3 mL Intracatheter Q8H Gino Leon MD   3 mL at  06/14/24 0450       Data     Most Recent 3 CBC's:  Recent Labs   Lab Test 06/13/24  0856 06/13/24  0605 06/12/24  1447   WBC 6.5 5.6 8.2   HGB 11.1* 10.8* 11.0*   * 101* 101*    271 274     Most Recent 3 BMP's:  Recent Labs   Lab Test 06/13/24  0856 06/13/24  0605 06/12/24  1447    140 137   POTASSIUM 4.4 4.2 4.5   CHLORIDE 103 104 101   CO2 24 24 21*   BUN 23.0 23.9* 24.2*   CR 1.01* 1.00* 1.07*   ANIONGAP 13 12 15   VANDANA 10.2 10.4* 10.2   GLC 97 83 89     Most Recent 3 Troponin's:No lab results found.  Most Recent 3 BNP's:No lab results found.  Most Recent Cholesterol Panel:  Recent Labs   Lab Test 04/29/24  0816   CHOL 194   LDL 85   HDL 88   TRIG 106         Medical Decision Making       30 MINUTES SPENT BY ME on the date of service doing chart review, history, exam, documentation & further activities per the note.          Clinically Significant Risk Factors           # Hypercalcemia: Highest Ca = 10.4 mg/dL in last 2 days, will monitor as appropriate        # Hypertension: Noted on problem list                    # Financial/Environmental Concerns: none

## 2024-06-14 NOTE — PLAN OF CARE
Goal Outcome Evaluation:      Plan of Care Reviewed With: patient    Overall Patient Progress: improvingOverall Patient Progress: improving     Reason for Admission: unstable angina    Cognitive/Mentation: A/Ox 4  Neuros/CMS: Intact   VS: stable.   Tele: NSR.  /GI: Continent.   Pulmonary: LS clear.  Pain: denies.     Drains/Lines: piv patent and intact  Skin: cdi   Activity: IND.  Diet: low sat fat with thin liquids. Takes pills whole.     Therapies recs: pending  Discharge: pending    Aggression Stoplight Tool: green    13:40 pt complained of dizziness, headache, and blurry vision RRT called.

## 2024-06-14 NOTE — PLAN OF CARE
Date/Time: 6/13/24 3256-5896    Summary: 74 y/o F w/PMH of bladder cancer, HLD, HTN, carotid stenosis s/p CEA, and CAD. Ongoing chest pressure since stent placement and last seen in cardiology in May. Had a failed stress test in April as well as 2 stents and an angio.  Diagnosis: unstable angina  Orientation: A&Ox4  Behavior & Aggression: green  Activity: ind  Diet: cardiac  Fall risk: no  Isolation: contact for ESBL in urine.  Pain: denies  B&B: continent; no BM this shift. Has ileal conduit with watkins bag attached.  VS/O2: VSS, RA.  IV: R chest port, heparin locked.  Drains/Devices: ileal conduit.  Tele:  SR  Abnormal Labs: none this shift.  Skin: R radial site, C/D/I.  Consults/Procedures: N/A  D/C Date: poss 6/14; will need to deaccess port and waiting to hear on imdur?  Other: had angio on 6/13. CMS intact.

## 2024-06-14 NOTE — CONSULTS
"[facility]     Stroke Code Note          History of Present Illness     Chief Complaint: Chest Pain      Michaela Dorman is a 73 year old female who was admitted 6/12 with unstable angina. She is s/p coronary angiogram 6/13 without intervention. Stroke code was called for blurry and double vision with associated headache. This was preceded by an episode of hypotension and pre-syncope. Vision changes persisted despite correction of blood pressure. By the time CT was complete, visual symptoms resolved but headache persisted.          Past Medical History     Stroke risk factors: bladder cancer, CAD, carotid artery stenosis, hypertension, hyperlipidemia     Preadmission antithrombotic regimen: aspirin and plavix     Modified David Score (Pre-morbid)  0 - No symptoms.                   Assessment and Plan       1.  Pre-syncope, hypotension   2.  Headache  3.  Transient blurry and double vision      Intravenous Thrombolysis  Not given due to:   - minor/isolated/quickly resolving symptoms     Endovascular Treatment  Not initiated due to absence of proximal vessel occlusion     Plan:  - blood pressure management per primary team; avoid hypotension   - MRI brain w/ and w/out contrast (ordered)     ___________________________________________________________________    Nesha Samuel NP  Vascular Neurology    To page me or covering stroke neurology team member, click here: [facility]COM  Choose \"On Call\" tab at top, then select \"NEUROLOGY/ALL SITES\" from middle drop-down box, press Enter, then look for \"stroke\" or \"telestroke\" for your site.  ___________________________________________________________________        Imaging/Labs   (personally reviewed CT, CTA)    CT head: no acute pathology   CTA head/neck: no LVO, moderate right PCA stenosis, no dissection          Physical Examination     BP: 106/57   Pulse: 70   Resp: 18   Temp: 98.3  F (36.8  C)   Temp src: Oral   SpO2: 96 %   O2 Device: None " (Room air)   Weight: 64.3 kg (141 lb 11.2 oz)    Wt Readings from Last 2 Encounters:   06/14/24 64.3 kg (141 lb 11.2 oz)   05/28/24 65.8 kg (145 lb)       Neurologic  Mental Status:  alert, oriented x 3, follows commands, speech clear and fluent, naming and repetition normal  Cranial Nerves:  visual fields intact, PERRL, EOMI with normal smooth pursuit, facial sensation intact and symmetric, facial movements symmetric, hearing not formally tested but intact to conversation, no dysarthria, shoulder shrug strong bilaterally, tongue protrusion midline  Motor:  normal muscle tone and bulk, no abnormal movements, able to move all limbs spontaneously, strength 5/5 throughout upper and lower extremities, no pronator drift  Sensory:  light touch sensation intact and symmetric throughout upper and lower extremities, no extinction on double simultaneous stimulation   Coordination:  normal finger-to-nose and heel-to-shin bilaterally without dysmetria  Station/Gait:  deferred        Stroke Scales       NIHSS  1a. Level of Consciousness 0-->Alert, keenly responsive   1b. LOC Questions 0-->Answers both questions correctly   1c. LOC Commands 0-->Performs both tasks correctly   2.   Best Gaze 0-->Normal   3.   Visual 0-->No visual loss (mildy blurred vision bilaterally)   4.   Facial Palsy 0-->Normal symmetrical movements   5a. Motor Arm, Left 0-->No drift, limb holds 90 (or 45) degrees for full 10 secs   5b. Motor Arm, Right 0-->No drift, limb holds 90 (or 45) degrees for full 10 secs   6a. Motor Leg, Left 0-->No drift, leg holds 30 degree position for full 5 secs   6b. Motor Leg, right 0-->No drift, leg holds 30 degree position for full 5 secs   7.   Limb Ataxia 0-->Absent   8.   Sensory 0-->Normal, no sensory loss   9.   Best Language 0-->No aphasia, normal   10. Dysarthria 0-->Normal   11. Extinction and Inattention  0-->No abnormality   Total 0 (06/14/24 1451)            Labs     CBC  Lab Results   Component Value Date    HGB  11.1 (L) 06/13/2024    HCT 33.9 (L) 06/13/2024    WBC 6.5 06/13/2024     06/13/2024       BMP  Lab Results   Component Value Date     06/13/2024    POTASSIUM 4.4 06/13/2024    CHLORIDE 103 06/13/2024    CO2 24 06/13/2024    BUN 23.0 06/13/2024    CR 1.01 (H) 06/13/2024    GLC 93 06/14/2024    VANDANA 10.2 06/13/2024       INR  INR   Date Value Ref Range Status   06/13/2024 1.02 0.85 - 1.15 Final   06/12/2024 0.93 0.85 - 1.15 Final   04/23/2024 0.85 0.85 - 1.15 Final       Data   Stroke Code Data  (for stroke code without tele)  Stroke code activated 06/14/24  1407   First stroke provider response 06/14/24  1412   Last known normal 06/14/24  1330   Time of discovery (or onset of symptoms) 06/14/24  1340   Head CT read by Stroke Neuro Provider 06/14/24  1432   Was stroke code de-escalated? Yes  06/14/24  1447        Clinically Significant Risk Factors           # Hypercalcemia: Highest Ca = 10.4 mg/dL in last 2 days, will monitor as appropriate        # Hypertension: Noted on problem list                  # Financial/Environmental Concerns: none          Time Spent on this Encounter   Billing: I personally examined and evaluated the patient today. At the time of my evaluation and management the patient was critical condition today due to acute neurological changes. I personally managed review of labs/images, neuro exam. Key decisions made today included MRI. I spent a total of 40 minutes providing critical care services, evaluating the patient, directing care and reviewing laboratory values and radiologic reports.

## 2024-06-14 NOTE — CODE/RAPID RESPONSE
[facility]    Code Stroke  House Officer Note    ////////////////////////////////////////////////////////////////////////////////////////////////////////////////////////////////  Acute stroke evaluation:  Time of arrival: 1:50 PM   Time called: 2:06 PM   Glucose level 93   Time patient seen by neurology: 1:55 PM   Time onset of stroke symptoms / witnessed onset: 1:40 PM   Time last known well: 1:30 PM   IV tPA admistered: NA   IA / Thrombolectomy NA   Stroke Presentation Type Inhouse Stroke   Stroke Thrombolytic Ineligible Reason Improving symptoms   Stroke Thrombolytic Treatments NA   Neurologists Nesha Samuel CNP   Stroke Type of Vascular Event No evidence of acute stroke    Pre TPa Wts entered? No   Post TPa VS Neuro Checks No      IV tPA:  Patient was not treated with rt-TP due to the following reason(s):  Rapidly improving symptoms     IA thrombolectomy:  IA intervention was performed      [employer] Stroke Scale  Exam Interval: Baseline   Score    Level of consciousness: (0)   Alert, keenly responsive    LOC questions: (0)   Answers both questions correctly    LOC commands: (0)   Performs both tasks correctly    Best gaze: (0)   Normal    Visual: (1)   Partial hemianopia    Facial palsy: (0)   Normal symmetrical movements    Motor arm (left): (0)   No drift    Motor arm (right): (0)   No drift    Motor leg (left): (0)   No drift    Motor leg (right): (0)   No drift    Limb ataxia: (0)   Absent    Sensory: (0)   Normal- no sensory loss    Best language: (0)   Normal- no aphasia    Dysarthria: (0)   Normal    Extinction and inattention: (0)   No abnormality        Total Score:  1       Imaging:  No results found for this or any previous visit (from the past 24 hour(s)).    Physical Exam   Vital Signs with Ranges:  Temp:  [97.5  F (36.4  C)-98.3  F (36.8  C)] 98.3  F (36.8  C)  Pulse:  [61-71] 71  Resp:  [16-18] 18  BP: (124-176)/(68-95) 136/81  SpO2:  [92 %-96 %] 96  "%    Assessment & Plan   I was called to evaluate Michaela Dorman, who is a 73 year old female who was admitted on 6/12/2024 for NSTEMI.     Assessment & Plan   Pre-syncope  Orthostatic Hypotension  Diplopia  While patient was getting dressed, preparing for discharge she felt lightheaded and dizzy, and reports \"I felt like I was going to faint\". She was assisted back to bed. Her BP was 61/57. She denies chest pain or SOB. She does endorses headache and blurred vision. She had coronary angiogram yesterday which showed non-obstructive coronary artery disease. Patient was started on Lisinopril and imdur today. She is currently taking ASA and plavix.     Suspect patient had medication induced orthostasis, with residual visual changes.     INTERVENTIONS:  - BG 93  - IV access established  - 500 ml bolus NS  - CT head w/o contrast: no acute intracranial pathology  - CTA Head and neck negative for LVO or high grade stenosis  - Permissive HTN with SBP < 180  - q4h neuro checks  - MRI pending  - Will place formal stroke neuro consult if MRI remarkable for acute stroke  - Patient will no longer being discharged today    Stroke risk factors  S/p coronary angiogram  CKD    BP normotensive. Discussed with and defer further cares to primary Hospitalist Dr. Mason.       Code Status: Full Code    Physical Exam   Constitutional: patient is in trendelenburg, A/O x  HEENT: Head normocephalic, atraumatic. Face symmetric. CN II-IIX intact.   Neck: Supple  Pulmonary: Non-labored breathing pattern. Lungs clear bilaterally.  Cardiovascular: RRR. No murmur, rub, or gallop.  Skin/Integumen: No rashes or lesions on exposed skin.   Psych:  Mood congruent.  Extremities: Moves all 4 extremities with equal strength.    Data   Recent Labs   Lab 06/13/24  0856 06/13/24  0605 06/12/24  1449 06/12/24  1447   WBC 6.5 5.6  --  8.2   HGB 11.1* 10.8*  --  11.0*   * 101*  --  101*    271  --  274   INR 1.02  --  0.93  --        Time Spent " on this Encounter     CRITICAL CARE TIME  I spent 45 minutes of critical care time on the unit/floor managing the care of Michaela Dorman. Upon evaluation, this patient had a high probability of imminent or life-threatening deterioration due to pre-syncope and visual changes, which required my direct attention, intervention, and personal management. 100% of my time was spent at the bedside counseling the patient and/or coordinating care regarding services listed in this note.

## 2024-06-14 NOTE — PROGRESS NOTES
A&O x4. VSS on room air. Tele SR. Denies CP/SOB/pain. Up with standby assist. R radial site WDL. Plan for possible discharge tomorrow.

## 2024-06-14 NOTE — PLAN OF CARE
[id number]  Neuro- A/Ox4  Most Recent Vitals- Temp: 97.9  F (36.6  C) Temp src: Oral BP: 124/75 Pulse: 69   Resp: 16 SpO2: 95 % O2 Device: None (Room air)   Tele/Cardiac- SR  Resp- on RA, LS clear  Activity- SBA  Pain- denies  Drips- n/a  Drains/Tubes- R chest port-a-cath  Skin- R radial site CDI, CMS intact  GI/- ileal conduit, adequate UOP; 2 senna given this evening  Plan- plan of care ongoing, likely discharge home tomorrow    Serena Gordon RN

## 2024-06-15 VITALS
TEMPERATURE: 98.4 F | OXYGEN SATURATION: 95 % | HEART RATE: 64 BPM | DIASTOLIC BLOOD PRESSURE: 86 MMHG | BODY MASS INDEX: 24.01 KG/M2 | WEIGHT: 144.3 LBS | RESPIRATION RATE: 18 BRPM | SYSTOLIC BLOOD PRESSURE: 152 MMHG

## 2024-06-15 LAB
ANION GAP SERPL CALCULATED.3IONS-SCNC: 11 MMOL/L (ref 7–15)
BUN SERPL-MCNC: 20.6 MG/DL (ref 8–23)
CALCIUM SERPL-MCNC: 9.7 MG/DL (ref 8.8–10.2)
CHLORIDE SERPL-SCNC: 107 MMOL/L (ref 98–107)
CREAT SERPL-MCNC: 1.01 MG/DL (ref 0.51–0.95)
DEPRECATED HCO3 PLAS-SCNC: 23 MMOL/L (ref 22–29)
EGFRCR SERPLBLD CKD-EPI 2021: 58 ML/MIN/1.73M2
ERYTHROCYTE [DISTWIDTH] IN BLOOD BY AUTOMATED COUNT: 14.6 % (ref 10–15)
GLUCOSE SERPL-MCNC: 83 MG/DL (ref 70–99)
HCT VFR BLD AUTO: 32.5 % (ref 35–47)
HGB BLD-MCNC: 10.6 G/DL (ref 11.7–15.7)
MCH RBC QN AUTO: 34 PG (ref 26.5–33)
MCHC RBC AUTO-ENTMCNC: 32.6 G/DL (ref 31.5–36.5)
MCV RBC AUTO: 104 FL (ref 78–100)
PLATELET # BLD AUTO: 246 10E3/UL (ref 150–450)
POTASSIUM SERPL-SCNC: 4.2 MMOL/L (ref 3.4–5.3)
RBC # BLD AUTO: 3.12 10E6/UL (ref 3.8–5.2)
SODIUM SERPL-SCNC: 141 MMOL/L (ref 135–145)
TROPONIN T SERPL HS-MCNC: 33 NG/L
WBC # BLD AUTO: 7.3 10E3/UL (ref 4–11)

## 2024-06-15 PROCEDURE — 99239 HOSP IP/OBS DSCHRG MGMT >30: CPT | Performed by: INTERNAL MEDICINE

## 2024-06-15 PROCEDURE — 84484 ASSAY OF TROPONIN QUANT: CPT

## 2024-06-15 PROCEDURE — 85027 COMPLETE CBC AUTOMATED: CPT

## 2024-06-15 PROCEDURE — 250N000011 HC RX IP 250 OP 636: Performed by: INTERNAL MEDICINE

## 2024-06-15 PROCEDURE — 80048 BASIC METABOLIC PNL TOTAL CA: CPT | Performed by: STUDENT IN AN ORGANIZED HEALTH CARE EDUCATION/TRAINING PROGRAM

## 2024-06-15 PROCEDURE — 250N000013 HC RX MED GY IP 250 OP 250 PS 637: Performed by: HOSPITALIST

## 2024-06-15 PROCEDURE — 99231 SBSQ HOSP IP/OBS SF/LOW 25: CPT | Performed by: PSYCHIATRY & NEUROLOGY

## 2024-06-15 RX ORDER — ISOSORBIDE MONONITRATE 30 MG/1
30 TABLET, EXTENDED RELEASE ORAL DAILY
Status: DISCONTINUED | OUTPATIENT
Start: 2024-06-15 | End: 2024-06-15 | Stop reason: HOSPADM

## 2024-06-15 RX ORDER — CARVEDILOL 3.12 MG/1
3.12 TABLET ORAL 2 TIMES DAILY WITH MEALS
Qty: 60 TABLET | Refills: 1 | Status: SHIPPED | OUTPATIENT
Start: 2024-06-15 | End: 2024-07-09

## 2024-06-15 RX ORDER — CARVEDILOL 3.12 MG/1
3.12 TABLET ORAL 2 TIMES DAILY WITH MEALS
Status: DISCONTINUED | OUTPATIENT
Start: 2024-06-15 | End: 2024-06-15 | Stop reason: HOSPADM

## 2024-06-15 RX ADMIN — EZETIMIBE 10 MG: 10 TABLET ORAL at 08:38

## 2024-06-15 RX ADMIN — OXYCODONE HYDROCHLORIDE 5 MG: 5 TABLET ORAL at 08:37

## 2024-06-15 RX ADMIN — CLOPIDOGREL BISULFATE 75 MG: 75 TABLET ORAL at 08:38

## 2024-06-15 RX ADMIN — Medication 5 ML: at 00:00

## 2024-06-15 RX ADMIN — LORAZEPAM 0.5 MG: 0.5 TABLET ORAL at 01:24

## 2024-06-15 RX ADMIN — ASPIRIN 81 MG CHEWABLE TABLET 81 MG: 81 TABLET CHEWABLE at 08:38

## 2024-06-15 RX ADMIN — ESCITALOPRAM OXALATE 5 MG: 5 TABLET, FILM COATED ORAL at 08:38

## 2024-06-15 RX ADMIN — PANTOPRAZOLE SODIUM 40 MG: 40 TABLET, DELAYED RELEASE ORAL at 08:38

## 2024-06-15 RX ADMIN — LORAZEPAM 0.5 MG: 0.5 TABLET ORAL at 05:43

## 2024-06-15 RX ADMIN — HEPARIN SODIUM (PORCINE) LOCK FLUSH IV SOLN 100 UNIT/ML 5 ML: 100 SOLUTION at 13:25

## 2024-06-15 RX ADMIN — ACETAMINOPHEN 1000 MG: 500 TABLET, FILM COATED ORAL at 05:33

## 2024-06-15 RX ADMIN — LEVOTHYROXINE SODIUM 25 MCG: 25 TABLET ORAL at 07:47

## 2024-06-15 RX ADMIN — MECLIZINE 12.5 MG: 12.5 TABLET ORAL at 08:38

## 2024-06-15 RX ADMIN — OXYCODONE HYDROCHLORIDE 5 MG: 5 TABLET ORAL at 01:24

## 2024-06-15 RX ADMIN — ATORVASTATIN CALCIUM 80 MG: 80 TABLET, FILM COATED ORAL at 08:38

## 2024-06-15 RX ADMIN — GABAPENTIN 600 MG: 300 CAPSULE ORAL at 08:38

## 2024-06-15 RX ADMIN — Medication 5 ML: at 05:56

## 2024-06-15 ASSESSMENT — ACTIVITIES OF DAILY LIVING (ADL)
ADLS_ACUITY_SCORE: 25
ADLS_ACUITY_SCORE: 24
ADLS_ACUITY_SCORE: 25
ADLS_ACUITY_SCORE: 25
ADLS_ACUITY_SCORE: 24
ADLS_ACUITY_SCORE: 25
ADLS_ACUITY_SCORE: 25
ADLS_ACUITY_SCORE: 24

## 2024-06-15 NOTE — PLAN OF CARE
Goal Outcome Evaluation:       [id number]    A&O x 4, pt reporting headache. PRN tylenol given with relief. Neuros otherwise intact. Did endorse dizziness when sitting up to get MRI this evening, denies while resting in bed. At start of shift pt BP was soft 85-89 systolic with MAP <65. Paged- see notes. Pt did report pain with PIV site, requested to have port re-accessed. BP became normotensive, gave IVF bolus. MRI resulted- paged, see notes. Tele: SR. Alarms on. Up A1. Nephrostomy. Plan for monitor BP. Q4H Neuros. Continue with plan of care.

## 2024-06-15 NOTE — PLAN OF CARE
Goal Outcome Evaluation:      Plan of Care Reviewed With: patient    Overall Patient Progress: improvingOverall Patient Progress: improving         Reason for Admission: unstable angina     Cognitive/Mentation: A/Ox 4  Neuros/CMS: Intact   VS: stable.   Tele: NSR.  /GI: Continent.   Pulmonary: LS clear.  Pain: denies.      Drains/Lines: PORT patent and intact  Skin: cdi   Activity: IND.  Diet: low sat fat with thin liquids. Takes pills whole.      Therapies recs: pending  Discharge: pending     Aggression Stoplight Tool: green  Lisinopril returned to pharmacy.

## 2024-06-15 NOTE — DISCHARGE SUMMARY
[facility]    Discharge Summary  Hospitalist    Date of Admission:  6/12/2024  Date of Discharge:  6/15/2024  Discharging Provider: Jordi Epstein MD    Discharge Diagnoses     Unstable angina-medically managed  Coronary artery disease status PCI of OM1 in April, 2024 with known residual left circumflex and moderate LAD disease not hemodynamically significant  Acute punctate subacute ischemic CVA involving the left brachium  pontis/cerebellar hemisphere.  Hypertension  Hyperlipidemia  Hypothyroidism  Anxiety  Depression  History of bladder cancer status cystectomy with ileal conduit in 6/1/2021  History of carotid stenosis status CEA   History of breast cancers status surgery  History of back surgery  GERD       Hospital Course:    Michaela Dorman is a 73 year old female has medical history which includes bladder cancer status cystectomy with ileal conduit in 6/1/2021 and follows with [facility] urology team, HLD ,HTN , Carotid stenosis s/p CEA , coronary artery disease status (PCI of the OM1 with AMBREEN and 60% stenosis of the proximal LCx at bifurcation with OM1 WHICH is severely calcified) who has been having ongoing chest pressure since the time of the stent and was last seen in cardiology clinic on 5/24 presented to the hospital with the worsening chest pain and admitted for unstable angina.     Unstable angina  Coronary artery disease status PCI of OM1 with known residual disease of left circumflex and LAD.  Hypertension  Hyperlipidemia  -Patient had angiogram done very recently and cardiac cath at that time did show that she has known residual lesion around 60% of the LCx at bifurcation of OM1 and patient was started on the medical therapy which she is compliant  -She has been having ongoing on and off chest discomfort and was seen in cardiology clinic on 5/24 with plan for outpatient angiogram which was scheduled for 6/13 but admitted to the hospital for unstable  angina  -Initial troponin is only mildly elevated at 35 and she has prior troponin elevation  -EKG did not show any ST elevation  -Chest pain improved with 2 nitros   -Cardiology consulted    -TTE shows borderline LVH, normal LVEF  -Patient underwent coronary angiogram on 6/14 which showed moderate stenosis of the left circumflex distal to the previously placed stent that was hemodynamically insignificant by IFR(0.96).  The mid LAD lesion was also negative by IFR.  Therefore no interventions were done  -Cardiology recommends continuing medical management  -Continue with aspirin 81 mg, atorvastatin 80 mg, Plavix 75 mg, Zetia 10 mg, Coreg 3.125 twice daily, given the hypotension yesterday lisinopril has been discontinued for now  -started on imdur 30 mg daily which will be continued at discharge.  -She is chest pain-free without any lightheadedness or dizziness and blood pressures are stable now.    Acute punctate subacute ischemic CVA involving the left brachium  pontis/cerebellar hemisphere.  -Patient had strokelike symptoms on 6/1 after coronary angiogram  -Patient was also hypertensive at that time  -Stroke neurology was consulted  -MRI of the brain shows punctate like subacute nonhemorrhagic infarct involving the left brachium/pontine/cerebellar hemisphere.  Neurology felt that MRI findings are secondary to coronary angiogram  -Blood pressures are much better, she is asymptomatic  -Neurology recommended continuing dual antiplatelet agent, no further stroke workup required at this point.     Hypothyroidism  -Continue with PTA levothyroxine     Anxiety  Depression  -Continue with PTA Lexapro 10 milligrams daily and as needed Ativan     History of bladder cancer status cystectomy with ileal conduit in 6/1/2021  -Noted     History of carotid stenosis status CEA  -Noted     History of breast cancers status surgery  History of back surgery  -Noted       GERD  -Continue with PTA PPI and Carafate as needed       Jordi  MD Lucian, MD    Significant Results and Procedures   See below    Pending Results     Unresulted Labs Ordered in the Past 30 Days of this Admission       No orders found from 5/13/2024 to 6/13/2024.            Code Status   Full Code       Primary Care Physician   Phani Albright    Physical Exam   Temp: 97.8  F (36.6  C) Temp src: Oral BP: 136/81 Pulse: 59   Resp: 18 SpO2: 95 % O2 Device: None (Room air)      Constitutional: AAOX3, NAD, Appears comfortable  Respiratory: CTA B/L, Normal WOB  Cardiovascular: RRR, No murmur  GI: Soft, Non- tender, BS- normoactive  Neuro: CN- grossly intact, Motor strength 5/5 on all 4 extremities.     Discharge Disposition   Discharged to home  Condition at discharge: Stable    Consultations This Hospital Stay   PHARMACY IP CONSULT  CARDIOLOGY IP CONSULT  CARE MANAGEMENT / SOCIAL WORK IP CONSULT  PHARMACY IP CONSULT  PHARMACY IP CONSULT  VASCULAR ACCESS ADULT IP CONSULT  SMOKING CESSATION PROGRAM IP CONSULT    Time Spent on this Encounter   I, Jordi Epstein MD, personally saw the patient today and spent greater than 30 minutes discharging this patient.    Discharge Orders      Primary Care - Care Coordination Referral      Medication Instructions - Anticoagulants    Do NOT stop your aspirin or platelet inhibitor unless directed by your Cardiologist.  These medications help to prevent platelets in your blood from sticking together and forming a clot.  Examples of these medications are:  Ticagrelor (Brilinta), Clopidigrel (Plavix), Prasugrel (Effient)     When to call - Contact the Heart Clinic    You may experience symptoms that require follow-up before your scheduled appointment. Contact the Heart Clinic if you develop: Fever over 100.4o Fahrenheit, that lasts more than one day; Redness, heat, or pus at the puncture site; Change in color or temperature in your hand or arm.     When to call - Reasons to Call 911    If your wrist puncture site starts bleeding after discharge, sit  down and apply firm pressure with your thumb against the puncture site and fingers against the back of the wrist for 10 minutes. If the bleeding stops, continue to rest, keeping your wrist still for 2 hours. Notify your doctor as soon as possible.  IF BLEEDING DOES NOT STOP OR THERE IS A LARGER AMOUNT OF BLEEDING OR SPURTING CALL 9-1-1 immediately.DO NOT drive yourself to the hospital.     Precautions - Lifting    DO NOT lift more than 5 pounds with affected arm for 48 hours     Precautions - Household Activities    Avoid excessive bending or movement of your wrist for 72 hours.  Do not subject hand/arm to any forceful movements for 24 hours, such as supporting weight when rising from a chair or bed.     Remove the band-aid on the puncture site after 24 hours and leave open to air. If minor oozing, you may apply a band-aid and remove after 12 hours.     Precautions - Active Sports Activities    DO NOT engage in vigorous exercise using your affected arm for 3 days after discharge.  This includes golf, tennis or swimming.     Precautions - Operating yard equipment or vehicles    Do not operate a chainsaw, lawnmower, motorcycle, or all-terrain vehicle for 48 hours after the procedure.     Precautions - Elective Dental Work    NO elective dental work for 6 weeks after receiving a stent.     Comfort and Pain Management - Bruising after Surgery    Expect mild tingling of hand and tenderness at the wrist puncture site for up to 3 days. You may take Tylenol or a pain medicine recommended by your doctor.     Activity - Cardiac Rehab    You are encouraged to enroll in an Outpatient Cardiac Rehab program after discharge from the hospital.  Our Cardiac Rehab staff may visit briefly with you while you're in the hospital.  If they miss you, someone will contact you after you are home.     Return to Driving    Driving is NOT permitted for 24 hours after surgery     Return to work    You may return to work after 72 hours if you are  feeling well and your job does not involve heavy lifting.     Shower / Bathing    You may shower on the day after your procedure.  DO NOT soak of wrist with the puncture site in water for 3 days to prevent infection. DO NOT take a tub bath or wash dishes for 3 days after the procedure     Dressing Removal    Remove the band-aid on the puncture site after 24 hours and leave open to air. If minor oozing, you may apply a band-aid and remove after 12 hours     Reason for your hospital stay    You were admitted to the hospital for chest pain     Follow-up and recommended labs and tests     New medications were started to help prevent chest pain and control blood pressure. These medications are lisinopril and imdur. Please follow up with cardiology as recommended. Please follow up with your primary care provider in 1-2 weeks for a recheck or sooner if needed. Return for recurrent or worsening chest pain, dyspnea, nausea, diaphoresis, or syncope.     Activity    Your activity upon discharge: activity as tolerated     Diet    Follow this diet upon discharge: Orders Placed This Encounter      Low Saturated Fat Na <2400 mg     Discharge Medications   Current Discharge Medication List        START taking these medications    Details   isosorbide mononitrate (IMDUR) 30 MG 24 hr tablet Take 1 tablet (30 mg) by mouth daily  Qty: 30 tablet, Refills: 0    Associated Diagnoses: Unstable angina (H); Coronary artery disease involving native coronary artery of native heart without angina pectoris           CONTINUE these medications which have CHANGED    Details   carvedilol (COREG) 3.125 MG tablet Take 1 tablet (3.125 mg) by mouth 2 times daily (with meals)  Qty: 60 tablet, Refills: 1    Associated Diagnoses: Unstable angina (H)           CONTINUE these medications which have NOT CHANGED    Details   acetaminophen (TYLENOL) 500 MG tablet Take 1,000 mg by mouth 3 times daily as needed for mild pain (500MG X 2 = 1,000MG)      aspirin  (ASA) 81 MG chewable tablet Take 1 tablet (81 mg) by mouth daily  Qty: 100 tablet, Refills: 3    Associated Diagnoses: Carotid artery plaque, bilateral; Carotid stenosis, bilateral; Right internal carotid artery aneurysm      atorvastatin (LIPITOR) 80 MG tablet Take 80 mg by mouth daily      clopidogrel (PLAVIX) 75 MG tablet Take 1 tablet (75 mg) by mouth daily  Qty: 90 tablet, Refills: 3    Associated Diagnoses: Abnormal cardiovascular stress test; Coronary artery disease involving native coronary artery of native heart without angina pectoris      Cyanocobalamin (B-12 PO) Take 1 tablet by mouth daily      escitalopram (LEXAPRO) 10 MG tablet Take 1 tablet (10 mg) by mouth daily  Qty: 90 tablet, Refills: 3    Associated Diagnoses: Adjustment disorder with depressed mood      ezetimibe (ZETIA) 10 MG tablet Take 1 tablet (10 mg) by mouth daily  Qty: 90 tablet, Refills: 3    Associated Diagnoses: Coronary artery calcification; Hyperlipidemia LDL goal <130      gabapentin (NEURONTIN) 300 MG capsule Take 2 capsules (600 mg) by mouth 3 times daily  Qty: 180 capsule, Refills: 5    Associated Diagnoses: Neuropathy      levothyroxine (SYNTHROID/LEVOTHROID) 25 MCG tablet Take 1 tablet (25 mcg) by mouth daily  Qty: 90 tablet, Refills: 1    Associated Diagnoses: Hypothyroidism due to acquired atrophy of thyroid      LORazepam (ATIVAN) 0.5 MG tablet Take 1 tablet (0.5 mg) by mouth every 4 hours as needed for anxiety, nausea or sleep  Qty: 20 tablet, Refills: 0    Associated Diagnoses: Adjustment disorder with depressed mood      meclizine (ANTIVERT) 25 MG tablet Take 12.5 mg by mouth every morning 1/2 tablet (to mitigate nausea from morning meds)      nitroGLYcerin (NITROSTAT) 0.4 MG sublingual tablet For chest pain place 1 tablet under the tongue every 5 minutes for 3 doses. If symptoms persist 5 minutes after 2nd dose call 911.  Qty: 30 tablet, Refills: 0    Associated Diagnoses: CAD (coronary artery disease)      oxyCODONE  (ROXICODONE) 5 MG tablet TAKE 1 TABLET (5 MG) BY MOUTH EVERY 6 HOURS AS NEEDED FOR MODERATE TO SEVERE PAIN  Qty: 30 tablet, Refills: 0    Associated Diagnoses: Lumbar radiculopathy; Chronic pain syndrome; DDD (degenerative disc disease), lumbar; Foraminal stenosis of lumbar region      pantoprazole (PROTONIX) 40 MG EC tablet Take 1 tablet (40 mg) by mouth daily  Qty: 90 tablet, Refills: 3    Associated Diagnoses: Gastroesophageal reflux disease, unspecified whether esophagitis present      prochlorperazine (COMPAZINE) 10 MG tablet Take 5 mg by mouth every 6 hours as needed for nausea or vomiting      sucralfate (CARAFATE) 1 GM tablet Take 1 g by mouth 2 times daily as needed for nausea (usually takes before supper)           Allergies   Allergies   Allergen Reactions    Oxybutynin      Patient reports symptoms of nausea and mind fogginess     Data   Most Recent 3 CBC's:  Recent Labs   Lab Test 06/15/24  0553 06/13/24  0856 06/13/24  0605   WBC 7.3 6.5 5.6   HGB 10.6* 11.1* 10.8*   * 101* 101*    275 271      Most Recent 3 BMP's:  Recent Labs   Lab Test 06/15/24  0551 06/14/24  1407 06/13/24  0856 06/13/24  0605     --  140 140   POTASSIUM 4.2  --  4.4 4.2   CHLORIDE 107  --  103 104   CO2 23  --  24 24   BUN 20.6  --  23.0 23.9*   CR 1.01*  --  1.01* 1.00*   ANIONGAP 11  --  13 12   VANDANA 9.7  --  10.2 10.4*   GLC 83 93 97 83     Most Recent 2 LFT's:  Recent Labs   Lab Test 04/29/24  0816 03/20/24  1306 01/28/24  1521   AST  --  24 28   ALT 36 20 27   ALKPHOS  --  54 64   BILITOTAL  --  0.3 0.2     Most Recent INR's and Anticoagulation Dosing History:  Anticoagulation Dose History  More data may exist         Latest Ref Rng & Units 5/14/2008 2/12/2018 10/1/2020 4/16/2021 4/23/2024 6/12/2024 6/13/2024   Recent Dosing and Labs   INR 0.85 - 1.15 0.91  0.88  0.9        This test is intended for monitoring Coumadin therapy.  Results are not   accurate in patients with prolonged INR due to factor  deficiency.   0.91  0.85  0.93  1.02      Most Recent 3 Troponin's:No lab results found.  Most Recent Cholesterol Panel:  Recent Labs   Lab Test 04/29/24  0816   CHOL 194   LDL 85   HDL 88   TRIG 106     Most Recent 6 Bacteria Isolates From Any Culture (See EPIC Reports for Culture Details):  Recent Labs   Lab Test 05/05/21  1150 03/08/21  1105 02/26/21  1401 02/03/21  1518 01/21/21  1024 01/12/21  1410   CULT 10,000 to 50,000 colonies/mL  urogenital cher  Susceptibility testing not routinely done   <10,000 colonies/mL  urogenital cher  Susceptibility testing not routinely done   No growth <10,000 colonies/mL  mixed urogenital cher  Susceptibility testing not routinely done   10,000 to 50,000 colonies/mL  mixed urogenital cher  Susceptibility testing not routinely done   <10,000 colonies/mL  mixed urogenital cher  Susceptibility testing not routinely done       Most Recent TSH, T4 and A1c Labs:  Recent Labs   Lab Test 11/27/23  1316 07/26/23  1355 05/02/22  1115 02/25/22  1725   TSH 2.11  --    < > 8.71*   T4  --   --   --  0.64*   A1C  --  5.4  --   --     < > = values in this interval not displayed.       Results for orders placed or performed during the hospital encounter of 06/12/24   XR Chest 2 Views    Narrative    CHEST TWO VIEWS 6/12/2024 3:11 PM     HISTORY: Chest pain.    COMPARISON: CT chest 5/24/2024.       Impression    IMPRESSION: No focal consolidation, pleural effusion or pneumothorax.  Cardiomediastinal silhouette is unremarkable. Right chest port and  right shoulder arthroplasty. Lumbar fusion hardware. Coronary stent.    ELLIE NAIR MD         SYSTEM ID:  [id number]   CT Head w/o Contrast    Narrative    CT SCAN OF THE HEAD WITHOUT CONTRAST   6/14/2024 2:29 PM     HISTORY: Code Stroke to evaluate for potential thrombolysis and  thrombectomy. Dizziness.    TECHNIQUE:  Axial images of the head and coronal reformations without  IV contrast material. Radiation dose for this scan was  reduced using  automated exposure control, adjustment of the mA and/or kV according  to patient size, or iterative reconstruction technique.    COMPARISON: Brain MR 7/17/2023.    FINDINGS: There is no evidence of intracranial hemorrhage, mass, or  anomaly. The ventricles are normal in size, shape and configuration.  Mild patchy periventricular white matter hypodensities which are  nonspecific, but likely related to chronic microvascular ischemic  disease.     The visualized portions of the sinuses and mastoids appear normal. The  bony calvarium and bones of the skull base appear intact.       Impression    IMPRESSION:     1. No evidence of acute intracranial hemorrhage, mass, or herniation.  2. Mild nonspecific white matter changes likely due to chronic  microvascular ischemic disease.     HAFSA BOYKIN MD         SYSTEM ID:  [id number]   CTA Head Neck with Contrast    Narrative    CT ANGIOGRAM OF THE HEAD AND NECK WITH CONTRAST  6/14/2024 2:32 PM     HISTORY: Code Stroke to evaluate for potential thrombolysis and  thrombectomy. Dizziness.    TECHNIQUE:  CT angiography with an injection of 117 mL [id number] IV  with scans through the head and neck. Images were transferred to a  separate 3-D workstation where multiplanar reformations and 3-D images  were created. Estimates of carotid stenoses are made relative to the  distal internal carotid artery diameters except as noted. Radiation  dose for this scan was reduced using automated exposure control,  adjustment of the mA and/or kV according to patient size, or iterative  reconstruction technique.    COMPARISON: CTA 8/31/2020.     CT HEAD FINDINGS: No contrast enhancing lesions. Cerebral blood flow  is grossly normal.     CT ANGIOGRAM HEAD FINDINGS:  The major intracranial arteries including  the proximal branches of the anterior cerebral, middle cerebral, and  posterior cerebral arteries appear patent without vascular cutoff. No  definite intracranial aneurysm. Slight  irregularity along the lateral  cavernous right ICA which may be an anatomic variant. Moderate  stenosis of the mid right P2 segment. Venous circulation is  unremarkable.     CT ANGIOGRAM NECK FINDINGS: Normal origin of the great vessels from  the aortic arch.     Right carotid artery: The right common and internal carotid arteries  are patent. Soft and calcified plaque at the carotid bifurcation and  proximal ICA. No significant stenosis by NASCET criteria.     Left carotid artery: The left common and internal carotid arteries are  patent. There appears to be sequela of left carotid endarterectomy  which is widely patent.     Vertebral arteries: Vertebral arteries are patent without evidence of  dissection. No significant stenosis.     Other findings: Degenerative changes in the spine particularly at  C5-C6.      Impression    IMPRESSION:   1.  Patent arteries in the neck without evidence of dissection.  2.  Atherosclerotic disease along the right carotid artery in the neck  without significant stenosis by NASCET criteria.  3.  Sequelae of left carotid endarterectomy which is widely patent.  This is new since 8/31/2020.  4.  No vascular cutoff of the proximal major intracranial arteries. No  intracranial aneurysm.  5.  Moderate stenosis of the mid right P2 segment.    Results discussed with nurse Debbie Bowden at 2:45 PM on  6/14/2024.    HAFSA BOYKIN MD         SYSTEM ID:  [id number]   MR Brain w/o & w Contrast    Narrative    EXAM: MR BRAIN W/O and W CONTRAST  LOCATION: [facility]  DATE: 6/14/2024    INDICATION: Blurry double vision.  COMPARISON: MRI dated 7/17/2023.  CONTRAST: 6 mL Gadavist  TECHNIQUE: Routine multiplanar multisequence head MRI without and with intravenous contrast.    FINDINGS:  INTRACRANIAL CONTENTS: A punctate focus of hyperintensity on the diffusion-weighted images within the left brachium pontis/cerebellar hemisphere with corresponding intermediate ADC values  without significant mass effect, most compatible with a subacute   infarct. No acute intracranial hemorrhage or extra-axial fluid collection. No intracranial mass or or pathologic enhancement. Allowing for differences in technique, unchanged scattered foci of T2 prolongation within the bilateral cerebral white matter   and dariana, nonspecific although most likely the sequela of chronic microvascular ischemia. Unchanged 1.1 cm more conspicuous focus of T2 prolongation within the left corona radiata, likely related to a small developmental venous anomaly. Normal ventricles   and sulci for age. Normal position of the cerebellar tonsils.     SELLA: Within technique limitations, no gross abnormality.    OSSEOUS STRUCTURES/SOFT TISSUES: No suspicious bone marrow signal intensity. Hyperostosis frontalis interna. The major intracranial vascular flow voids are maintained.     ORBITS: A subcentimeter enhancing curvilinear structure within the inferolateral intraconal compartment of the right orbit, likely present on the prior exam although increased in conspicuity on the current exam (series 11 image 10), most compatible with   an incidental venous varix. No definite acute intraorbital abnormality. Please note that this exam is not specifically tailored for the assessment of the orbits.     SINUSES/MASTOIDS: No evidence of significant paranasal sinus or mastoid mucosal disease.        Impression    IMPRESSION:  1.  A punctate likely subacute nonhemorrhagic infarct involving the left brachium pontis/cerebellar hemisphere without mass effect.  2.  Chronic additional changes, as described.   Echocardiogram Complete     Value    LVEF  55-60%    Narrative    [id number]  [id number]  [id number]  [id number]^FRANDY^EVERT     [facility]  Echocardiography Laboratory  [address] [place] [address]     Name: NICHOLAS DONNELLY  MRN: [id number]  : 1950  Study Date: 2024 12:12 PM  Age: 73 yrs  Gender:  Female  Patient Location: [facility]  Reason For Study: Chest Pain  Ordering Physician: EVERT WISE  Referring Physician: ELY WISEMAN  Performed By: Viktoria Wells     BSA: 1.7 m2  Height: 65 in  Weight: 145 lb  HR: 55  BP: 146/78 mmHg  ______________________________________________________________________________  Procedure  Complete Portable Echo Adult. Optison (NDC #2056-0125) given intravenously.  ______________________________________________________________________________  Interpretation Summary     See stress echo result 4/17/2024  There is borderline concentric left ventricular hypertrophy.  Left ventricular systolic function is normal.  Normal left ventricular wall motion  ______________________________________________________________________________  Left Ventricle  The left ventricle is normal in size. There is borderline concentric left  ventricular hypertrophy. Left ventricular systolic function is normal. The  visual ejection fraction is 55-60%. Grade I or early diastolic dysfunction.  Normal left ventricular wall motion. There is no thrombus seen in the left  ventricle.     Right Ventricle  The right ventricle is normal in size and function.     Atria  Normal left atrial size. Right atrial size is normal.     Mitral Valve  The mitral valve leaflets appear normal. There is no evidence of stenosis,  fluttering, or prolapse.     Tricuspid Valve  Normal tricuspid valve. Right ventricular systolic pressure could not be  approximated due to inadequate tricuspid regurgitation.     Aortic Valve  The aortic valve is trileaflet.     Pulmonic Valve  The pulmonic valve is not well seen, but is grossly normal.     Vessels  The aortic root is normal size. IVC diameter <2.1 cm collapsing >50% with  sniff suggests a normal RA pressure of 3 mmHg.     Pericardium  The pericardium appears normal.     Rhythm  Sinus rhythm was noted.  ______________________________________________________________________________  MMode/2D  Measurements & Calculations  IVSd: 1.1 cm     LVIDd: 4.3 cm  LVPWd: 1.3 cm  LV mass(C)d: 177.3 grams  LV mass(C)dI: 102.7 grams/m2  Ao root diam: 3.5 cm  LA dimension: 3.2 cm  asc Aorta Diam: 3.4 cm  LA/Ao: 0.90  LVOT diam: 1.9 cm  LVOT area: 2.8 cm2  Ao root diam index Ht(cm/m): 2.1  Ao root diam index BSA (cm/m2): 2.0  Asc Ao diam index BSA (cm/m2): 2.0  Asc Ao diam index Ht(cm/m): 2.1  LA Volume (BP): 46.9 ml     LA Volume Index (BP): 27.1 ml/m2  RV Base: 2.6 cm  RWT: 0.58  TAPSE: 2.2 cm     Doppler Measurements & Calculations  MV E max luis: 39.1 cm/sec  MV A max luis: 63.2 cm/sec  MV E/A: 0.62  MV dec time: 0.28 sec  E/E' av.4  Lateral E/e': 6.8  Medial E/e': 8.1  RV S Luis: 9.0 cm/sec     ______________________________________________________________________________  Report approved by: Mika Aguirre 2024 01:53 PM         Cardiac Catheterization    Narrative      Mid LAD lesion is 50% stenosed.    Prox Cx lesion is 60% stenosed.    1st Mrg-2 lesion is 40% stenosed.    Nonobstructive CAD.  Patent stents in the LCx.  Residual moderate stenosis in LCx and stenosis   distal to the previously placed stents was evaluated with iFR which was   not hemodynamically significant at 0.96.  Moderate LAD stenosis evalauted with iFR and the LAD stenosis was not   hemodynamically significant.  iFR 0.94.  LVEDP relatively low, 0.94.         *Note: Due to a large number of results and/or encounters for the requested time period, some results have not been displayed. A complete set of results can be found in Results Review.

## 2024-06-15 NOTE — PROGRESS NOTES
Brief Cardiology Note  Pt: Michaela Dorman    1950      Patient's BP improved overnight. Recommend decrease carvedilol to 3.125 mg BID and resuming Imdur 30 mg daily. Stop lisinopril.     Outpatient cardiology follow-up has been arranged.     Geneva Mendiola CNP  9:32 AM 6/15/2024   [facility]  Pager: [phone]

## 2024-06-15 NOTE — PROGRESS NOTES
"Vascular neurology progress note     S: feels at baseline, denies stroke symptoms   O: /81   Pulse 59   Temp 97.8  F (36.6  C) (Oral)   Resp 18   Wt 65.5 kg (144 lb 4.8 oz)   SpO2 95%   BMI 24.01 kg/m     Awake, alert, oriented X3. Normal speech. No cranial nerve deficit. No focal weakness. No ataxia. No hemiyypesthesia.     MRI brain shows 2 punctate DWI areas.     A&P:   -symptomatic hypotension causing an episode of presyncope yesterday. Resolved with IVF.   -MRI findings are secondary to coronary angiogram and not related to the episode of presyncope.     Continue DAPT and vascular risk factors control per cardiology.   No further stroke workup is recommended.   Will sign off. Please call with questions.     I spent 25 minutes in face to face with this patient and coordinating care. Over 50% of the time on the unit was spent counseling the patient and/or coordinating care as documented.     Brooke Ferguson MD, Msc, PERCY, FAAN   of Neurology  [facility]     06/15/2024 11:06 AM  To page me or covering stroke neurology team member, click here: AMCOM  Choose \"On Call\" tab at top, then search dropdown box for \"Neurology Adult\" & press Enter, look for Neuro ICU/Stroke      "

## 2024-06-15 NOTE — PROGRESS NOTES
A&O x 4, VSS on RA except fluctuating, currently hypertensive. Earlier hypotensive and required bolus. Reported headache, given oxycodone and Tylenol. Anxious a times,. Given Ativan x 4.  Neuro intact, no dizziness or blurred vision Up x 1 assist to bathroom, denied dizziness. Nephrostomy with adequate urine output. House office saw patient and discussed with MRI result Tele SR. Continue plan of care.

## 2024-06-15 NOTE — PROVIDER NOTIFICATION
Paged Elida Henriquez @ 1938-     Pt here with NSTEMI, angina. Angio done with no intervention, cardiology started imdur and lisinopril. Code stroke called d/t blurry vision, hypotension, headache. CT done-resulted. MRI still pending. Pt hypotensive BPs 86/47, map 59. Complaining of headache still, and weakness. Stated was very dizzy sitting up to get MRI, denies laying down. Got Coreg @ 1735. Neuros otherwise intact     Orders: held coreg, 500 ml bolus ordered

## 2024-06-15 NOTE — PROGRESS NOTES
"House [facility] brief note:    Was contacted by nursing requesting assistance with updating pt with MRI results.  Presented to pt's bedside.  Discussed with pt MRI noting \"A punctate likely subacute nonhemorrhagic infarct involving the left brachium pontis/cerebellar hemisphere without mass effect\".  Discussed with pt nursing previously updated neuro stroke; pt to remain normotensive, on ASA/plavix.  P'ts neuro exam intact, no further blurry vision.  All questions answered.        LISA Sanderson, CNP  House [facility]    No charge.  "

## 2024-06-15 NOTE — PROVIDER NOTIFICATION
Paged Mark Thakkar (Neurology) @ [phone]-     Pt code stroke earlier. MRI now completed. Hospitalist paged and wants you notified to review MRI     Orders: Continue Plavix, Aspirin. BP goal normotensive with MAP 60-65. Q4H Neuro checks.

## 2024-06-17 ENCOUNTER — PATIENT OUTREACH (OUTPATIENT)
Dept: CARE COORDINATION | Facility: CLINIC | Age: 74
End: 2024-06-17
Payer: COMMERCIAL

## 2024-06-17 ENCOUNTER — HOSPITAL ENCOUNTER (OUTPATIENT)
Dept: CARDIAC REHAB | Facility: CLINIC | Age: 74
Discharge: HOME | End: 2024-06-17
Attending: INTERNAL MEDICINE
Payer: MEDICARE

## 2024-06-17 PROCEDURE — 93798 PHYS/QHP OP CAR RHAB W/ECG: CPT

## 2024-06-17 NOTE — PROGRESS NOTES
Clinic Care Coordination Contact  [facility]/Voicemail    Clinical Data: Care Coordinator Outreach    Outreach Documentation Number of Outreach Attempt   6/17/2024  12:08 PM 1       Left message on patient's voicemail with call back information and requested return call.    Plan: Care Coordinator will try to reach patient again in 1-2 business days.    Dotty Barriga RN Care Coordination   [facility] [place]  Email: [email or web address]  Phone: [phone]

## 2024-06-18 ASSESSMENT — ACTIVITIES OF DAILY LIVING (ADL): DEPENDENT_IADLS:: INDEPENDENT

## 2024-06-18 NOTE — PROGRESS NOTES
Clinic Care Coordination Contact  Transitions of Care Outreach  Chief Complaint   Patient presents with    Clinic Care Coordination - Post Hospital     RN CC- post hospital follow up       Most Recent Admission Date: 6/12/2024   Most Recent Admission Diagnosis: Unstable angina (H) - I20.0     Most Recent Discharge Date: 6/15/2024   Most Recent Discharge Diagnosis: Unstable angina (H) - I20.0  Coronary artery disease involving native coronary artery of native heart without angina pectoris - I25.10  Hypertension goal BP (blood pressure) < 140/90 - I10     Transitions of Care Assessment    Discharge Assessment  How are you doing now that you are home?: per patient report, she is doing really good. she said she is feeling well and has all medications. follow up on 6/20/2024  How are your symptoms? (Red Flag symptoms escalate to triage hotline per guidelines): Improved  Do you know how to contact your clinic care team if you have future questions or changes to your health status? : Yes  Does the patient have their discharge instructions? : Yes  Does the patient have questions regarding their discharge instructions? : No  Were you started on any new medications or were there changes to any of your previous medications? : Yes  Does the patient have all of their medications?: Yes  Do you have questions regarding any of your medications? : No  Do you have all of your needed medical supplies or equipment (DME)?  (i.e. oxygen tank, CPAP, cane, etc.): Yes         Post-op (Clinicians Only)  Fever: No  Chills: No  Eating & Drinking: eating and drinking without complaints/concerns    Follow up Plan     Discharge Follow-Up  Discharge follow up appointment scheduled in alignment with recommended follow up timeframe or Transitions of Risk Category? (Low = within 30 days; Moderate= within 14 days; High= within 7 days): Yes  Discharge Follow Up Appointment Date: 06/20/24  Discharge Follow Up Appointment Scheduled with?: Specialty Care  Provider    Future Appointments   Date Time Provider Department Center   6[facility]19/2024  2:00 PM [facility] [place]   6/20/2024 10:00 AM Girish Sauer MD [facility]   6/20/2024  1:40 PM Dennis Mueller,  PHSPRT [facility]   6/24/2024  2:00 PM [facility] [place]   6/28/2024  1:00 PM PHECHR1 PHCVSV [facility] [place]   7/9/2024 10:15 AM Anais Payne PA-C [facility]   7/23/2024  3:15 PM Makeda Guadarrama PA-C [facility]       Outpatient Plan as outlined on AVS reviewed with patient.    For any urgent concerns, please contact our 24 hour nurse triage line: [phone] ([phone])       Patient declines care coordination at this time.    Dotty Barriga RN

## 2024-06-19 ENCOUNTER — HOSPITAL ENCOUNTER (OUTPATIENT)
Dept: CARDIAC REHAB | Facility: CLINIC | Age: 74
Discharge: HOME | End: 2024-06-19
Attending: INTERNAL MEDICINE
Payer: MEDICARE

## 2024-06-19 PROCEDURE — 93798 PHYS/QHP OP CAR RHAB W/ECG: CPT

## 2024-06-21 ENCOUNTER — MYC MEDICAL ADVICE (OUTPATIENT)
Dept: CARDIOLOGY | Facility: CLINIC | Age: 74
End: 2024-06-21
Payer: COMMERCIAL

## 2024-06-21 DIAGNOSIS — I20.0 UNSTABLE ANGINA (H): ICD-10-CM

## 2024-06-21 DIAGNOSIS — I25.10 CORONARY ARTERY DISEASE INVOLVING NATIVE CORONARY ARTERY OF NATIVE HEART WITHOUT ANGINA PECTORIS: ICD-10-CM

## 2024-06-21 RX ORDER — ISOSORBIDE MONONITRATE 30 MG/1
15 TABLET, EXTENDED RELEASE ORAL DAILY
Status: SHIPPED
Start: 2024-06-21 | End: 2024-07-09

## 2024-06-21 NOTE — TELEPHONE ENCOUNTER
Received response from Dr. Choi:     Keshia Choi MD  P [facility] Heart Team 4  Phone Number: [phone]     Lets decrease imdur to 15 mg thanks!    Called pt, no answer. Left detailed VM with recommendations and sent [employer] message. Medication list updated.

## 2024-06-21 NOTE — TELEPHONE ENCOUNTER
"Called pt regarding MyChart message:     ----- Message -----   From: Dandy Michaela KENYON \"Lissa\"   Sent: 6/21/2024   9:26 AM CDT   To: [facility] Heart Team 7   Subject: BP                                               Hi. My bp is 80/46. I'm very light headed. Do I need to worry.     Last visit with Dr. Choi 5/28/24. Pt was admitted 6/12/24-6/15/24 for unstable angina, coronary angiogram was done on 6/12/24, no intervention. Pt was started on Imdur 30 mg daily and carvedilol decreased to 3.125 mg BID due to hypotension. Pt also seen by stroke neurology for acute punctate subacute ischemic CVA.     Pt stated since she sent the message her BP has increased to 106/58 HR 92 and she is feeling better now. Pt stated she has previously had episodes of lightheadedness with low BP when she was on lisinopril but this was discontinued and this is the first low BP episode since she was discharged from the hospital on 6/15/24. Pt confirmed she is taking carvedilol 3.125 mg BID and Imdur 30 mg daily as prescribed. Denied any chest pain. Requested pt drink some extra fluids today and stay well hydrated. Requested pt let us know if she continues to have lightheadedness episodes. Discussed changing positions slowly. Advised will let Dr. Choi know about pt's episode this morning and call back with any recommendations. Pt is scheduled for follow up with KINA Manzo on 7/9/24.   "

## 2024-06-24 ENCOUNTER — TELEPHONE (OUTPATIENT)
Dept: ADMISSION | Facility: CLINIC | Age: 74
End: 2024-06-24
Payer: COMMERCIAL

## 2024-06-24 ENCOUNTER — HOSPITAL ENCOUNTER (OUTPATIENT)
Dept: CARDIAC REHAB | Facility: CLINIC | Age: 74
Discharge: HOME | End: 2024-06-24
Attending: INTERNAL MEDICINE
Payer: MEDICARE

## 2024-06-24 PROCEDURE — 93798 PHYS/QHP OP CAR RHAB W/ECG: CPT

## 2024-06-24 NOTE — TELEPHONE ENCOUNTER
Reason for Call:  Other call back    Detailed comments: pt calling to state that she is having trouble and is retaining water - she went from 132 to 145 in 3 days. Please call and advise     Phone Number Patient can be reached at: Home number on file [phone] (home)    Best Time: any    Can we leave a detailed message on this number? YES    Call taken on 6/24/2024 at 4:47 PM by Dyan Velez

## 2024-06-25 ENCOUNTER — MYC MEDICAL ADVICE (OUTPATIENT)
Dept: CARDIOLOGY | Facility: CLINIC | Age: 74
End: 2024-06-25
Payer: COMMERCIAL

## 2024-06-25 DIAGNOSIS — M54.16 LUMBAR RADICULOPATHY: ICD-10-CM

## 2024-06-25 DIAGNOSIS — G89.4 CHRONIC PAIN SYNDROME: ICD-10-CM

## 2024-06-25 DIAGNOSIS — M48.061 FORAMINAL STENOSIS OF LUMBAR REGION: ICD-10-CM

## 2024-06-25 DIAGNOSIS — M51.369 DDD (DEGENERATIVE DISC DISEASE), LUMBAR: ICD-10-CM

## 2024-06-25 DIAGNOSIS — C67.9 UROTHELIAL CARCINOMA OF BLADDER (H): ICD-10-CM

## 2024-06-25 RX ORDER — OXYCODONE HYDROCHLORIDE 5 MG/1
5 TABLET ORAL EVERY 6 HOURS PRN
Qty: 30 TABLET | Refills: 0 | Status: SHIPPED | OUTPATIENT
Start: 2024-06-25 | End: 2024-07-15

## 2024-06-25 RX ORDER — AMOXICILLIN 250 MG
2 CAPSULE ORAL AT BEDTIME
Qty: 100 TABLET | Refills: 0 | Status: SHIPPED | OUTPATIENT
Start: 2024-06-25

## 2024-06-25 NOTE — TELEPHONE ENCOUNTER
Called and confirmed with patient which medication she was looking for to help with her constipation.  Will route to PCP    Rebecca Feng RN on 6/25/2024 at 12:01 PM

## 2024-06-25 NOTE — TELEPHONE ENCOUNTER
Patient sent [employer] message:  Hi there. I'm concerned about my weight gain in 5 days from 138 to 145 and I feel so bloated. But I also have not had a bowel movement for 5 days. Could I go on a water pill and c if that helps.     Patient recently discharged from [facility] on 6/15/24 for unstable angina, no intervention. Patient recently decreased imdur from 30mg to 15mg daily and carvedilol decreased to 3.125 mg BID due to hypotension. Pt also seen by stroke neurology for acute punctate subacute ischemic CVA. Patient was last seen by Dr. Choi on 5/28/24.     Patient is currently not on any diuretics.     Patient has follow up scheduled with KINA Marks on 7/9/24 and KINA Molina on 7/23/24. Could move up visit with CORI to this week if needed. Will route to Dr. Choi for further recommendations.     ADDENDUM 11:09AM-   Spoke with patient as we moved up her follow up visit from 7/9/24 to 6/26/24 at 1pm with Hiral Hanley NP. Kept the visit with KINA Molina on 7/23/24 just in case it is needed after tomorrows visit. Cancelled echocardiogram that was scheduled on 6/28/24 as patient had echocardiogram done in hospital on 6/13/24.     Patient mentioned that her BM are small round balls and has not been able to have a good soft BM in 5 days. She does take one 5mg tablet of oxycodone every morning due to her back pain which she knows can cause constipation. Patient said she does not have any shortness of breath, she worked a little in her garden this morning and did fine. She does have bloating in her abdomen. She had tried stool softeners and miralax but now relief. Patient said a while back her PCP KNIA Azar had prescribed her a stool softener with laxative which worked well for her. Patient wondering if she should try that again. Will route to PCP.       Dr. Choi LOV note from 5/28/24:  ASSESSMENT/PLAN:  1. Chest pressure: will evaluate stent placement and also consider IFR/FFR of prox  LCX/mid LAD and complex PCI  -coronary angiogram at [facility]. Risks and benefits discussed and patient wishes to proceed  -echocardiogram  -one month CORI      Keshia Choi MD [facility]

## 2024-07-02 DIAGNOSIS — F43.21 ADJUSTMENT DISORDER WITH DEPRESSED MOOD: ICD-10-CM

## 2024-07-03 ENCOUNTER — HOSPITAL ENCOUNTER (OUTPATIENT)
Dept: CARDIAC REHAB | Facility: CLINIC | Age: 74
Discharge: HOME | End: 2024-07-03
Attending: INTERNAL MEDICINE
Payer: MEDICARE

## 2024-07-03 PROCEDURE — 93798 PHYS/QHP OP CAR RHAB W/ECG: CPT

## 2024-07-03 RX ORDER — LORAZEPAM 0.5 MG/1
0.5 TABLET ORAL EVERY 4 HOURS PRN
Qty: 20 TABLET | Refills: 0 | Status: SHIPPED | OUTPATIENT
Start: 2024-07-03 | End: 2024-07-09

## 2024-07-03 NOTE — PROGRESS NOTES
~Cardiology Clinic Visit~    Michaela Dorman MRN# [id number]   YOB: 1950 Age: 73 year old   Primary Cardiologist: Dr. Choi            Assessment and Plan:   Michaela Dorman is a very pleasant 73 year old female who is here today for post hospital follow up      CAD, without angina  -Coronary angiogram 4/2024- PCI with AMBREEN x2 to the CHLOE  -Coronary angiogram 6/2024-  residual moderate stenosis of the LCx and stenosis distal to the previously placed stents was evaluated with iFR which was not hemodynamically significant (0.96). The mid LAD lesion was negative by iFR (0.94).     DAPT: Aspirin 81 mg daily, clopidogrel 75 mg daily  Statin: Atorvastatin 80 mg daily  BB: Coreg 3.125 mg twice daily  ACE/ARB: None   Cardiac rehab: Attending    Labile blood pressure   HLD  Lipid profile 4/2024-  and LDL 85  History of bladder cancer   CKD   Carotid artery disease s/p CEA 3/2021    Discontinue Imdur 15 mg daily.  Encouraged patient to continue to monitor her blood pressure at home. Instructed her to update the clinic if her blood pressure is lower than 90 systolic.     Follow up with CORI in 2 weeks       Anais Payne PA-C  Physician Assistant   [facility]  Pager: [phone]          History of Presenting Illness:    Michaela Dorman is a very pleasant 73 year old female with a history of CAD (status post PCI of the first obtuse marginal branch with known residual significant circumflex disease and moderate LAD disease), HTN, HLD, history of bladder cancer, CKD, and carotid artery disease (s/p CEA 2021).     In brief, patient was recently hospitalized at [facility] from 6/12-6/14/2024 for unstable angina. Coronary angiogram demonstrated residual moderate stenosis of the LCx and stenosis distal to the previously placed stents was evaluated with iFR which was not hemodynamically significant (0.96). The mid LAD lesion was negative by iFR (0.94). Previous placed  "OM stents were patent.     Echocardiogram noted LVEF 55-60% with early diastolic dysfunction.     Patient reports she has had a \"tough week\".  Last week she had a blood pressure reading in the 60/40s. EMS came to her house and her blood pressure improved. However, two days later patient had another low blood pressure reading and was evaluated in the emergency room. She was instructed to hold on taking her Coreg if her blood pressure was <125. Patient has not taken her Coreg for the past two days. Yesterday, she noted her blood pressures to be normal. Again, this morning prior to her medications her blood pressure was in the 120s then dropped to 60/40 after her morning medications. When her blood pressure gets this low she becomes lightheaded.     Denies shortness of breath, orthopnea and PND. Denies chest pain, palpitations, near syncope and syncope. Denies epistaxis, ecchymosis, and melena.     Taking medications daily as prescribed.      Blood pressure 90/50 and HR 86 in clinic today.          Social History       Social History     Socioeconomic History    Marital status:      Spouse name: ozzie    Number of children: 5    Years of education: Not on file    Highest education level: Not on file   Occupational History     Employer: [employer]   Tobacco Use    Smoking status: Former     Current packs/day: 0.00     Average packs/day: 3.0 packs/day for 10.0 years (30.0 ttl pk-yrs)     Types: Cigarettes     Start date: 1969     Quit date: 1979     Years since quittin.6     Passive exposure: Never    Smokeless tobacco: Never    Tobacco comments:     approx. 3 packs daily   Vaping Use    Vaping status: Never Used   Substance and Sexual Activity    Alcohol use: Not Currently     Comment: Stopped drinking a few weeks ago (2023)    Drug use: No    Sexual activity: Yes     Partners: Male   Other Topics Concern     Service Not Asked    Blood Transfusions Not Asked    Caffeine Concern No    " Occupational Exposure Not Asked    Hobby Hazards No    Sleep Concern No    Stress Concern Yes    Weight Concern Not Asked    Special Diet Not Asked    Back Care Not Asked    Exercise Yes    Bike Helmet Not Asked    Seat Belt Yes    Self-Exams Not Asked    Parent/sibling w/ CABG, MI or angioplasty before 65F 55M? Not Asked   Social History Narrative    Not on file     Social Determinants of Health     Financial Resource Strain: Low Risk  (4/25/2024)    Financial Resource Strain     Within the past 12 months, have you or your family members you live with been unable to get utilities (heat, electricity) when it was really needed?: No   Food Insecurity: Low Risk  (4/25/2024)    Food Insecurity     Within the past 12 months, did you worry that your food would run out before you got money to buy more?: No     Within the past 12 months, did the food you bought just not last and you didn t have money to get more?: No   Transportation Needs: Low Risk  (4/25/2024)    Transportation Needs     Within the past 12 months, has lack of transportation kept you from medical appointments, getting your medicines, non-medical meetings or appointments, work, or from getting things that you need?: No   Physical Activity: Sufficiently Active (4/25/2024)    Exercise Vital Sign     Days of Exercise per Week: 5 days     Minutes of Exercise per Session: 100 min   Stress: Stress Concern Present (4/25/2024)    [place] [employer] - Occupational Stress Questionnaire     Feeling of Stress : To some extent   Social Connections: Unknown (4/25/2024)    Social Connection and Isolation Panel [NHANES]     Frequency of Communication with Friends and Family: Not on file     Frequency of Social Gatherings with Friends and Family: Once a week     Attends Voodoo Services: Not on file     Active Member of Clubs or Organizations: Not on file     Attends Club or Organization Meetings: Not on file     Marital Status: Not on file    Interpersonal Safety: Low Risk  (4/25/2024)    Interpersonal Safety     Do you feel physically and emotionally safe where you currently live?: Yes     Within the past 12 months, have you been hit, slapped, kicked or otherwise physically hurt by someone?: No     Within the past 12 months, have you been humiliated or emotionally abused in other ways by your partner or ex-partner?: No   Housing Stability: Low Risk  (4/25/2024)    Housing Stability     Do you have housing? : Yes     Are you worried about losing your housing?: No            Review of Systems:   Please see HPI         Physical Exam:   Vitals: There were no vitals taken for this visit.   Wt Readings from Last 4 Encounters:   06/15/24 65.5 kg (144 lb 4.8 oz)   05/28/24 65.8 kg (145 lb)   05/07/24 63.5 kg (140 lb)   05/06/24 64.4 kg (142 lb)     GEN: well nourished, in no acute distress.  NECK: Supple. JVP was not appreciated. Bilateral carotid arteries without bruits.  C/V:  Regular rate and rhythm, no murmur, rub or gallop.    RESP: Respirations are unlabored. Clear to auscultation bilaterally without wheezing, rales, or rhonchi.  EXTREM: Bilateral lower extremities with no edema.   SKIN: Warm and dry.        Data:   LIPID RESULTS:  Lab Results   Component Value Date    CHOL 194 04/29/2024    CHOL 199 10/08/2020    HDL 88 04/29/2024    HDL 76 10/08/2020    LDL 85 04/29/2024    LDL 87 10/08/2020    TRIG 106 04/29/2024    TRIG 180 (H) 10/08/2020    CHOLHDLRATIO 3.0 08/02/2010     LIVER ENZYME RESULTS:  Lab Results   Component Value Date    AST 24 03/20/2024    AST 20 05/21/2021    ALT 36 04/29/2024    ALT 38 05/21/2021     CBC RESULTS:  Lab Results   Component Value Date    WBC 7.3 06/15/2024    WBC 13.5 (H) 06/24/2021    RBC 3.12 (L) 06/15/2024    RBC 2.99 (L) 06/24/2021    HGB 10.6 (L) 06/15/2024    HGB 9.6 (L) 06/24/2021    HCT 32.5 (L) 06/15/2024    HCT 30.0 (L) 06/24/2021     (H) 06/15/2024     06/24/2021    MCH 34.0 (H) 06/15/2024     MCH 32.1 06/24/2021    MCHC 32.6 06/15/2024    MCHC 32.0 06/24/2021    RDW 14.6 06/15/2024    RDW 14.8 06/24/2021     06/15/2024     06/24/2021     BMP RESULTS:  Lab Results   Component Value Date     06/15/2024     06/24/2021    POTASSIUM 4.2 06/15/2024    POTASSIUM 4.8 11/28/2022    POTASSIUM 4.7 06/24/2021    CHLORIDE 107 06/15/2024    CHLORIDE 108 11/28/2022    CHLORIDE 106 06/24/2021    CO2 23 06/15/2024    CO2 26 11/28/2022    CO2 24 06/24/2021    ANIONGAP 11 06/15/2024    ANIONGAP 4 11/28/2022    ANIONGAP 6 06/24/2021    GLC 83 06/15/2024    GLC 93 06/14/2024    GLC 90 11/28/2022     (H) 06/24/2021    BUN 20.6 06/15/2024    BUN 28 11/28/2022    BUN 30 06/24/2021    CR 1.01 (H) 06/15/2024    CR 1.10 (H) 06/24/2021    GFRESTIMATED 58 (L) 06/15/2024    GFRESTIMATED 48 (L) 03/07/2023    GFRESTIMATED 50 (L) 06/24/2021    GFRESTBLACK 58 (L) 06/24/2021    VANDANA 9.7 06/15/2024    VANDANA 9.6 06/24/2021      A1C RESULTS:  Lab Results   Component Value Date    A1C 5.4 07/26/2023    A1C 5.2 10/08/2020     INR RESULTS:  Lab Results   Component Value Date    INR 1.02 06/13/2024    INR 0.93 06/12/2024    INR 0.91 04/16/2021    INR 0.9 10/01/2020    INR 0.88 02/12/2018            Medications     Current Outpatient Medications   Medication Sig Dispense Refill    acetaminophen (TYLENOL) 500 MG tablet Take 1,000 mg by mouth 3 times daily as needed for mild pain (500MG X 2 = 1,000MG)      aspirin (ASA) 81 MG chewable tablet Take 1 tablet (81 mg) by mouth daily 100 tablet 3    atorvastatin (LIPITOR) 80 MG tablet Take 80 mg by mouth daily      carvedilol (COREG) 3.125 MG tablet Take 1 tablet (3.125 mg) by mouth 2 times daily (with meals) 60 tablet 1    clopidogrel (PLAVIX) 75 MG tablet Take 1 tablet (75 mg) by mouth daily 90 tablet 3    Cyanocobalamin (B-12 PO) Take 1 tablet by mouth daily      escitalopram (LEXAPRO) 10 MG tablet Take 1 tablet (10 mg) by mouth daily (Patient taking differently: Take 5  mg by mouth daily) 90 tablet 3    ezetimibe (ZETIA) 10 MG tablet Take 1 tablet (10 mg) by mouth daily 90 tablet 3    gabapentin (NEURONTIN) 300 MG capsule Take 2 capsules (600 mg) by mouth 3 times daily 180 capsule 5    isosorbide mononitrate (IMDUR) 30 MG 24 hr tablet Take 0.5 tablets (15 mg) by mouth daily      levothyroxine (SYNTHROID/LEVOTHROID) 25 MCG tablet Take 1 tablet (25 mcg) by mouth daily 90 tablet 1    LORazepam (ATIVAN) 0.5 MG tablet Take 1 tablet (0.5 mg) by mouth every 4 hours as needed for anxiety, nausea or sleep 20 tablet 0    meclizine (ANTIVERT) 25 MG tablet Take 12.5 mg by mouth every morning 1/2 tablet (to mitigate nausea from morning meds)      nitroGLYcerin (NITROSTAT) 0.4 MG sublingual tablet For chest pain place 1 tablet under the tongue every 5 minutes for 3 doses. If symptoms persist 5 minutes after 2nd dose call 911. 30 tablet 0    oxyCODONE (ROXICODONE) 5 MG tablet TAKE 1 TABLET (5 MG) BY MOUTH EVERY 6 HOURS AS NEEDED FOR MODERATE TO SEVERE PAIN 30 tablet 0    pantoprazole (PROTONIX) 40 MG EC tablet Take 1 tablet (40 mg) by mouth daily 90 tablet 3    prochlorperazine (COMPAZINE) 10 MG tablet Take 5 mg by mouth every 6 hours as needed for nausea or vomiting      senna-docusate (SENEXON-S) 8.6-50 MG tablet Take 2 tablets by mouth at bedtime 100 tablet 0    sucralfate (CARAFATE) 1 GM tablet Take 1 g by mouth 2 times daily as needed for nausea (usually takes before supper)            Past Medical History     Past Medical History:   Diagnosis Date    Acute kidney injury (H24)     Carotid stenosis, bilateral L80%, R40% 09/02/2020    Central stenosis of spinal canal 12/01/2017    lumbar     DDD (degenerative disc disease), lumbar 12/01/2017    Depressive disorder, not elsewhere classified     Esophageal reflux     ETOH abuse     in remission    Foraminal stenosis of lumbar region 12/01/2017    Complete lumbar spine left at various degrees and to a lesser extent the right as well.    Lumbar  radiculopathy 11/30/2017    Personal history of malignant neoplasm of breast     Prophylactic antibiotic for dental procedure indicated due to prior joint replacement 06/05/2017    S/P reverse total shoulder arthroplasty, right 06/05/2017    Subdural hematoma (H)     Thyroid disease     Urothelial carcinoma (H)      Past Surgical History:   Procedure Laterality Date    ARTHROPLASTY SHOULDER Right 11/17/2015    ARTHROSCOPY KNEE  6/7/2012    Procedure:ARTHROSCOPY KNEE; right knee arthroscopy with partial lateral menisectomy; Surgeon:DAMIÁN MCALLISTER; Location:[facility]    BIOPSY VAGINAL N/A 10/27/2021    Procedure: DISTAL URETHRECTOMY;  Surgeon: Leonardo Robles MD;  Location: [facility] OR    COLONOSCOPY N/A 12/22/2017    Procedure: COLONOSCOPY;  colonoscopy;  Surgeon: Chuck Chand MD;  Location: [facility]    CV CORONARY ANGIOGRAM N/A 4/23/2024    Procedure: Coronary Angiogram;  Surgeon: Leonardo Marinelli MD;  Location: [facility] CARDIAC CATH LAB    CV CORONARY ANGIOGRAM N/A 6/13/2024    Procedure: Coronary Angiogram;  Surgeon: Juliette Stevens MD;  Location: [facility] CARDIAC CATH LAB    CV INTRAVASULAR ULTRASOUND N/A 4/23/2024    Procedure: Intravascular Ultrasound;  Surgeon: Leonardo Marinelli MD;  Location:  [facility] CARD[facility]AC CATH LAB    CV LEFT HEART CATH N/A 4/23/2024    Procedure: Left Heart Catheterization;  Surgeon: Leonardo Marinelli MD;  Location:  [facility] CARDIAC CATH LAB    CV LEFT HEART CATH N/A 6/13/2024    Procedure: Left Heart Catheterization;  Surgeon: Juliette Stevens MD;  Location:  HEART CARDIAC CATH LAB    CV PCI N/A 6/13/2024    Procedure: Percutaneous Coronary Intervention;  Surgeon: Juliette Stevens MD;  Location: [facility] CARDIAC CATH LAB    CV PCI STENT DRUG ELUTING N/A 4/23/2024    Procedure: Percutaneous Coronary Intervention Stent;  Surgeon: Leonardo Marinelli MD;  Location: [facility] CARDIAC CATH LAB    CYSTECTOMY BLADDER RADICAL, ILEAL DIVERSION, COMBINED N/A  6/1/2021    Procedure: RADICAL CYSTECTOMY  WITH ILEAL CONDUIT CREATION,BILATERAL PELVIC LYMPHADENECTOMY;  Surgeon: Leonardo Robles MD;  Location: [facility] OR    CYSTOSCOPY, RETROGRADES, COMBINED Bilateral 3/18/2021    Procedure: CYSTOSCOPY, WITH RETROGRADE PYELOGRAM;  Surgeon: Girish Jack MD;  Location: [facility] OR    CYSTOSCOPY, TRANSURETHRAL RESECTION (TUR) TUMOR BLADDER, COMBINED N/A 3/18/2021    Procedure: CYSTOSCOPY, WITH TRANSURETHRAL RESECTION BLADDER TUMOR;  Surgeon: Girish Jack MD;  Location: [facility] OR    ENDARTERECTOMY CAROTID Left 10/8/2020    Procedure: LEFT CAROTID ENDARTERECTOMY WITH EEG;  Surgeon: Lacho Jasmine MD;  Location: [facility] OR    ESOPHAGOSCOPY, GASTROSCOPY, DUODENOSCOPY (EGD), COMBINED N/A 6/20/2022    Procedure: ESOPHAGOGASTRODUODENOSCOPY, WITH BIOPSY;  Surgeon: Ramses Arenas MD;  Location: [facility]    EXCISE MASS AXILLA Left 9/16/2016    Procedure: EXCISE MASS AXILLA;  Surgeon: Keyon Blanco MD;  Location: [facility]    HC REMV CATARACT EXTRACAP,INSERT LENS, W/O ECP  6/25/2009    Right eye    INJECT EPIDURAL LUMBAR N/A 4/11/2019    Procedure: interlaminar epidual steroid injection lumbar 4-5;  Surgeon: Oskar Salvador MD;  [facility]    INJECT EPIDURAL LUMBAR Bilateral 9/12/2019    Procedure: lumbar 4-5 epidural interlaminar steroid injection;  Surgeon: Oskar Salvador MD;  [facility]    INSERT PORT VASCULAR ACCESS Right 10/7/2016    Procedure: INSERT PORT VASCULAR ACCESS;  Surgeon: Keyon Blanco MD;  [facility]    IR CHEST PORT PLACEMENT > 5 YRS OF AGE  4/16/2021    MASTECTOMY SIMPLE BILATERAL Bilateral 2/8/2017    Procedure: MASTECTOMY SIMPLE BILATERAL;  Surgeon: Keyon Blanco MD;  [facility]    OPEN REDUCTION INTERNAL FIXATION FOOT Right 3/20/2015    Procedure: OPEN REDUCTION INTERNAL FIXATION FOOT;  Surgeon: Javi Herrmann DPM;  [facility]    OPTICAL TRACKING SYSTEM FUSION SPINE POSTERIOR LUMBAR TWO LEVELS N/A 2/4/2020    Procedure: LUMBAR  2-SACRAL1 TRANSFORMINAL INTERBODY FUSION;  Surgeon: Lenny Gomes MD;  Location: [facility]    REMOVE PORT VASCULAR ACCESS Right 2/14/2018    Procedure: REMOVE PORT VASCULAR ACCESS;  right intra jugular port removal;  Surgeon: Keyon Blanco MD;  [facility]    SHOULDER SURGERY Right 11/2015    Z LIGATE FALLOPIAN TUBE      [facility] NONSPECIFIC PROCEDURE  1956    ankle fracture surgery     Family History   Problem Relation Age of Onset    Hypertension Mother     Thyroid Disease Mother     Cerebrovascular Disease Mother     Hypertension Father     Cerebrovascular Disease Father     Cancer Father         skin    Thyroid Disease Sister     Diabetes Brother         type 2    Depression Sister     Breast Cancer Sister         age 47    Cancer Sister         skin    Cancer Brother         inside nose            Allergies   Oxybutynin      This note was completed in part using dictation via the Dragon voice recognition software. Some word and grammatical errors may occur and must be interpreted in the appropriate clinical context.  If there are any questions pertaining to this issue, please contact me for further clarification.

## 2024-07-03 NOTE — TELEPHONE ENCOUNTER
REFILL REQUEST:    LORazepam (ATIVAN) 0.5 MG tablet 20 tablet 0 6/3/2024 -- No   Sig - Route: Take 1 tablet (0.5 mg) by mouth every 4 hours as needed for anxiety, nausea or sleep - Oral     Last office visit: 4/11/2024 with prescribing provider:  Geneva GONZALEZ CNP   Future Office Visit: Dr. Sauer on 08/05/24  Next 5 appointments (look out 90 days)      Jul 26, 2024 2:30 PM  (Arrive by 2:10 PM)  Provider Visit with Phani Albright PA-C  [facility] ([facility] ) [address] [place] [id number] [phone]             Requested Prescriptions   Pending Prescriptions Disp Refills    LORazepam (ATIVAN) 0.5 MG tablet [Pharmacy Med Name: LORAZEPAM 0.5MG TABLET] 20 tablet 0     Sig: TAKE 1 TABLET (0.5 MG) BY MOUTH EVERY 4 HOURS AS NEEDED FOR ANXIETY, NAUSEA OR SLEEP       There is no refill protocol information for this order          Sent to Dr. Sauer to review/sign.    Noemi Garcia RN on 7/3/2024 at 10:15 AM

## 2024-07-04 ENCOUNTER — NURSE TRIAGE (OUTPATIENT)
Dept: NURSING | Facility: CLINIC | Age: 74
End: 2024-07-04
Payer: COMMERCIAL

## 2024-07-04 ENCOUNTER — MYC MEDICAL ADVICE (OUTPATIENT)
Dept: CARDIOLOGY | Facility: CLINIC | Age: 74
End: 2024-07-04
Payer: COMMERCIAL

## 2024-07-04 NOTE — TELEPHONE ENCOUNTER
"Low BP 8am 86/54, 1/2 hr later=72/42. She takes BP meds for high blood pressure. It was low yesterday in am, she says she was told to cut her isosorbide in half, which she did. She is due to take Carvedilol now, but does not want to take it. She checks her blood pressure daily. She was/ is \"super light headed\", no LOC. Needs help to walk (able to take only a couple of steps and then needs to sit down).    Triaged to a disposition of call 911 now. She agrees to do this. (And willl lie down with feet elevated in the meanwhile).     Rashmi Gilman RN Triage Nurse Advisor 9:49 AM 7/4/2024  Reason for Disposition   Shock suspected (e.g., cold/pale/clammy skin, too weak to stand, low BP, rapid pulse)    Additional Information   Negative: Started suddenly after an allergic medicine, an allergic food, or bee sting    Protocols used: Blood Pressure - Low-A-AH    "

## 2024-07-06 ENCOUNTER — HOSPITAL ENCOUNTER (EMERGENCY)
Facility: CLINIC | Age: 74
Discharge: HOME | End: 2024-07-06
Attending: EMERGENCY MEDICINE | Admitting: EMERGENCY MEDICINE
Payer: MEDICARE

## 2024-07-06 ENCOUNTER — APPOINTMENT (OUTPATIENT)
Dept: CT IMAGING | Facility: CLINIC | Age: 74
End: 2024-07-06
Attending: EMERGENCY MEDICINE
Payer: MEDICARE

## 2024-07-06 VITALS
TEMPERATURE: 98.4 F | RESPIRATION RATE: 16 BRPM | DIASTOLIC BLOOD PRESSURE: 76 MMHG | HEART RATE: 62 BPM | BODY MASS INDEX: 24.01 KG/M2 | OXYGEN SATURATION: 99 % | HEIGHT: 65 IN | SYSTOLIC BLOOD PRESSURE: 146 MMHG

## 2024-07-06 DIAGNOSIS — R51.9 ACUTE NONINTRACTABLE HEADACHE, UNSPECIFIED HEADACHE TYPE: ICD-10-CM

## 2024-07-06 DIAGNOSIS — F43.21 ADJUSTMENT DISORDER WITH DEPRESSED MOOD: ICD-10-CM

## 2024-07-06 DIAGNOSIS — R09.89 LABILE BLOOD PRESSURE: ICD-10-CM

## 2024-07-06 LAB
ALBUMIN SERPL BCG-MCNC: 4.2 G/DL (ref 3.5–5.2)
ALP SERPL-CCNC: 58 U/L (ref 40–150)
ALT SERPL W P-5'-P-CCNC: 24 U/L (ref 0–50)
ANION GAP SERPL CALCULATED.3IONS-SCNC: 12 MMOL/L (ref 7–15)
AST SERPL W P-5'-P-CCNC: 24 U/L (ref 0–45)
BASOPHILS # BLD AUTO: 0.1 10E3/UL (ref 0–0.2)
BASOPHILS NFR BLD AUTO: 1 %
BILIRUB SERPL-MCNC: 0.2 MG/DL
BUN SERPL-MCNC: 26.6 MG/DL (ref 8–23)
CALCIUM SERPL-MCNC: 9.6 MG/DL (ref 8.8–10.2)
CHLORIDE SERPL-SCNC: 104 MMOL/L (ref 98–107)
CREAT SERPL-MCNC: 1.1 MG/DL (ref 0.51–0.95)
DEPRECATED HCO3 PLAS-SCNC: 21 MMOL/L (ref 22–29)
EGFRCR SERPLBLD CKD-EPI 2021: 52 ML/MIN/1.73M2
EOSINOPHIL # BLD AUTO: 0.5 10E3/UL (ref 0–0.7)
EOSINOPHIL NFR BLD AUTO: 7 %
ERYTHROCYTE [DISTWIDTH] IN BLOOD BY AUTOMATED COUNT: 14.4 % (ref 10–15)
GLUCOSE SERPL-MCNC: 87 MG/DL (ref 70–99)
HCT VFR BLD AUTO: 30.6 % (ref 35–47)
HGB BLD-MCNC: 10.1 G/DL (ref 11.7–15.7)
IMM GRANULOCYTES # BLD: 0 10E3/UL
IMM GRANULOCYTES NFR BLD: 0 %
LYMPHOCYTES # BLD AUTO: 2.5 10E3/UL (ref 0.8–5.3)
LYMPHOCYTES NFR BLD AUTO: 34 %
MAGNESIUM SERPL-MCNC: 1.9 MG/DL (ref 1.7–2.3)
MCH RBC QN AUTO: 33.4 PG (ref 26.5–33)
MCHC RBC AUTO-ENTMCNC: 33 G/DL (ref 31.5–36.5)
MCV RBC AUTO: 101 FL (ref 78–100)
MONOCYTES # BLD AUTO: 0.6 10E3/UL (ref 0–1.3)
MONOCYTES NFR BLD AUTO: 9 %
NEUTROPHILS # BLD AUTO: 3.7 10E3/UL (ref 1.6–8.3)
NEUTROPHILS NFR BLD AUTO: 50 %
NRBC # BLD AUTO: 0 10E3/UL
NRBC BLD AUTO-RTO: 0 /100
PLATELET # BLD AUTO: 264 10E3/UL (ref 150–450)
POTASSIUM SERPL-SCNC: 4.2 MMOL/L (ref 3.4–5.3)
PROT SERPL-MCNC: 6.8 G/DL (ref 6.4–8.3)
RBC # BLD AUTO: 3.02 10E6/UL (ref 3.8–5.2)
SODIUM SERPL-SCNC: 137 MMOL/L (ref 135–145)
WBC # BLD AUTO: 7.4 10E3/UL (ref 4–11)

## 2024-07-06 PROCEDURE — 96374 THER/PROPH/DIAG INJ IV PUSH: CPT | Performed by: EMERGENCY MEDICINE

## 2024-07-06 PROCEDURE — 80053 COMPREHEN METABOLIC PANEL: CPT | Performed by: EMERGENCY MEDICINE

## 2024-07-06 PROCEDURE — 36415 COLL VENOUS BLD VENIPUNCTURE: CPT | Performed by: EMERGENCY MEDICINE

## 2024-07-06 PROCEDURE — 96361 HYDRATE IV INFUSION ADD-ON: CPT | Performed by: EMERGENCY MEDICINE

## 2024-07-06 PROCEDURE — 99284 EMERGENCY DEPT VISIT MOD MDM: CPT | Performed by: EMERGENCY MEDICINE

## 2024-07-06 PROCEDURE — 258N000003 HC RX IP 258 OP 636: Performed by: EMERGENCY MEDICINE

## 2024-07-06 PROCEDURE — 99285 EMERGENCY DEPT VISIT HI MDM: CPT | Mod: 25 | Performed by: EMERGENCY MEDICINE

## 2024-07-06 PROCEDURE — 85025 COMPLETE CBC W/AUTO DIFF WBC: CPT | Performed by: EMERGENCY MEDICINE

## 2024-07-06 PROCEDURE — 70450 CT HEAD/BRAIN W/O DYE: CPT | Mod: MF

## 2024-07-06 PROCEDURE — 250N000011 HC RX IP 250 OP 636: Performed by: EMERGENCY MEDICINE

## 2024-07-06 PROCEDURE — 250N000013 HC RX MED GY IP 250 OP 250 PS 637: Performed by: EMERGENCY MEDICINE

## 2024-07-06 PROCEDURE — 83735 ASSAY OF MAGNESIUM: CPT | Performed by: EMERGENCY MEDICINE

## 2024-07-06 RX ORDER — HYDROMORPHONE HYDROCHLORIDE 1 MG/ML
0.5 INJECTION, SOLUTION INTRAMUSCULAR; INTRAVENOUS; SUBCUTANEOUS
Status: COMPLETED | OUTPATIENT
Start: 2024-07-06 | End: 2024-07-06

## 2024-07-06 RX ORDER — OXYCODONE HYDROCHLORIDE 5 MG/1
5 TABLET ORAL ONCE
Status: COMPLETED | OUTPATIENT
Start: 2024-07-06 | End: 2024-07-06

## 2024-07-06 RX ORDER — HEPARIN SODIUM (PORCINE) LOCK FLUSH IV SOLN 100 UNIT/ML 100 UNIT/ML
5-10 SOLUTION INTRAVENOUS
Status: DISCONTINUED | OUTPATIENT
Start: 2024-07-06 | End: 2024-07-06 | Stop reason: HOSPADM

## 2024-07-06 RX ADMIN — OXYCODONE HYDROCHLORIDE 5 MG: 5 TABLET ORAL at 16:39

## 2024-07-06 RX ADMIN — HEPARIN 5 ML: 100 SYRINGE at 18:37

## 2024-07-06 RX ADMIN — SODIUM CHLORIDE 500 ML: 9 INJECTION, SOLUTION INTRAVENOUS at 16:33

## 2024-07-06 RX ADMIN — HYDROMORPHONE HYDROCHLORIDE 0.5 MG: 1 INJECTION, SOLUTION INTRAMUSCULAR; INTRAVENOUS; SUBCUTANEOUS at 17:27

## 2024-07-06 ASSESSMENT — ACTIVITIES OF DAILY LIVING (ADL)
ADLS_ACUITY_SCORE: 37

## 2024-07-06 ASSESSMENT — COLUMBIA-SUICIDE SEVERITY RATING SCALE - C-SSRS
2. HAVE YOU ACTUALLY HAD ANY THOUGHTS OF KILLING YOURSELF IN THE PAST MONTH?: NO
6. HAVE YOU EVER DONE ANYTHING, STARTED TO DO ANYTHING, OR PREPARED TO DO ANYTHING TO END YOUR LIFE?: NO
1. IN THE PAST MONTH, HAVE YOU WISHED YOU WERE DEAD OR WISHED YOU COULD GO TO SLEEP AND NOT WAKE UP?: NO

## 2024-07-06 NOTE — ED TRIAGE NOTES
Pt reports a history of her BP running low after they had stents placed and they changed her medications. Pt states today she felt light headed and took her BP and got 67/34 so she called 911 and when they got there the BP was back up. Pt states since the BP went low she has had a headache. Took tylenol at noon today.      Triage Assessment (Adult)       Row Name 07/06/24 1459          Triage Assessment    Airway WDL WDL        Respiratory WDL    Respiratory WDL WDL        Skin Circulation/Temperature WDL    Skin Circulation/Temperature WDL WDL

## 2024-07-06 NOTE — DISCHARGE INSTRUCTIONS
Follow-up at your scheduled cardiology appointment.    Continue to monitor and record your blood pressures.  If your blood pressure is below 125 for the top number, do not take the carvedilol.  You can also consider taking just the Imdur in the morning and the carvedilol at night (just once a day).    Continue your headache medications as needed.    Return at anytime for worsening, changes or concerns.    I hope you enjoy the rest of your summer!

## 2024-07-07 NOTE — ED PROVIDER NOTES
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  Reviewing medical records, patient was hospitalized 6/12 - 6/15/2024 with unstable angina.  She also had coronary artery stenting in April 2024.  Apparently since her stenting her blood pressure has been up and down.  She was started on Coreg and after her last hospitalization on Imdur.  Today before taking her medications her blood pressure was 114/60.  She had breakfast, took her meds, went to the gym and came home.  She notes drinking adequate water at the time.  She was home and was going to get up to swim in the pool and felt extremely lightheaded.  She took her blood pressure and it was 67/34.  Patient states this was not the first time this has happened and in reviewing epic medical records patient has been in contact with her cardiology service a number of times regarding low morning blood pressures.  Her Imdur dose was halved from 30 mg to 50 mg.  She states prior to her stenting she was on Coreg 3.125 mg just once daily but is now on it twice daily.  She denies any chest pain, shortness of breath.  No blackness of blood in her stools.  Normal urination.  She felt well when she was working out at the gym.  She has a little bit of heartburn right now.  I also reviewed her cardiac rehab notes and her lowest blood pressures apparently are around 100 systolic.  Patient quit drinking all alcohol about 5 days ago, prior to that she would drink a couple of beverages per night.  No other significant changes.  Patient was evaluated by paramedics and apparently her  blood pressure initially was a little low but then normalized.  Since her blood pressure has gone down she has had a headache.  This usually improves with Tylenol but it has not improved this time despite taking Tylenol.  Patient is on Plavix.    Allergies:  Allergies   Allergen Reactions    Oxybutynin      Patient reports symptoms of nausea and mind fogginess       Problem List:    Patient Active Problem List    Diagnosis Date Noted    Unstable angina (H) 06/12/2024     Priority: Medium    Carotid artery disease without cerebral infarction (H24) 04/25/2024     Priority: Medium    Imbalance 02/08/2024     Priority: Medium    Chemotherapy-induced neuropathy (H24) 01/03/2024     Priority: Medium    Unspecified severe protein-calorie malnutrition (H24) 01/03/2024     Priority: Medium    Chronic fatigue syndrome 07/07/2023     Priority: Medium    Vision changes 07/07/2023     Priority: Medium    Balance problems 07/07/2023     Priority: Medium    Skin sensation disturbance 07/07/2023     Priority: Medium    Ileostomy status (H) 07/07/2023     Priority: Medium    Acquired hypothyroidism 07/07/2023     Priority: Medium    Hypothyroidism due to medication 07/07/2023     Priority: Medium    Pulmonary nodules 07/07/2023     Priority: Medium    F11.2 - Continuous opioid dependence (H) 07/07/2023     Priority: Medium    Status post ileal conduit (H) 09/12/2022     Priority: Medium    Status of artificial opening of urinary tract (H) 08/19/2022     Priority: Medium    Rheumatoid arthritis of multiple sites with negative rheumatoid factor (H) 02/25/2022     Priority: Medium    Parathyroid abnormality (H24) 02/25/2022     Priority: Medium    Hypomagnesemia 11/18/2021     Priority: Medium    Need for prophylactic vaccination and inoculation against influenza 11/18/2021     Priority: Medium    Constipation, unspecified constipation type 09/28/2021     Priority: Medium    Imaging abnormalities 09/28/2021     Priority: Medium     Anemia in neoplastic disease 05/21/2021     Priority: Medium    Chemotherapy-induced neutropenia (H24) 05/19/2021     Priority: Medium    Thrombocytopenia (H24) 05/19/2021     Priority: Medium    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) 04/07/2021     Priority: Medium     Check 11.21 for f/u Marty  Added automatically from request for surgery [id number]      Malignant neoplasm of overlapping sites of bladder (H) 03/08/2021     Priority: Medium    Personal history of malignant neoplasm of bladder 03/04/2021     Priority: Medium     Added automatically from request for surgery [id number]      Benign paroxysmal positional vertigo, bilateral 12/24/2020     Priority: Medium    Personal history of malignant neoplasm of breast 10/01/2020     Priority: Medium    Acute cystitis with hematuria 10/01/2020     Priority: Medium    Symptomatic stenosis of left carotid artery 09/22/2020     Priority: Medium     Added automatically from request for surgery [id number]      POSSIBLE Right internal carotid artery aneurysm 09/08/2020     Priority: Medium    Carotid stenosis, bilateral L80%, R40% 09/02/2020     Priority: Medium    Carotid artery plaque, bilateral 09/02/2020     Priority: Medium    Anemia 07/16/2020     Priority: Medium    S/P lumbar fusion 02/04/2020     Priority: Medium    Bilateral impacted cerumen 01/13/2020     Priority: Medium    Stage 3a chronic kidney disease (H) 09/09/2019     Priority: Medium    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      Central stenosis of spinal canal 12/01/2017     Priority: Medium     lumbar        DDD (degenerative disc disease), lumbar 12/01/2017     Priority: Medium    Chronic pain  syndrome 11/30/2017     Priority: Medium     substancePatient is followed by Phani Jason PA-C for ongoing prescription of pain medication.  All refills should only be approved by this provider, or covering partner.    Medication(s): Oxycodone IR 5mg.   Maximum quantity per month: 20  Clinic visit frequency required: Q 3 months     Controlled substance agreement:  Encounter-Level CSA:       There are no encounter-level csa.          Pain Clinic evaluation in the past: Yes       Date/Location:      DIRE Total Score(s):  No flowsheet data found.    Last [facility] website verification:  none   [email or web address]            Lumbar radiculopathy 11/30/2017     Priority: Medium    S/P reverse total shoulder arthroplasty, right 06/05/2017     Priority: Medium    Prophylactic antibiotic for dental procedure indicated due to prior joint replacement 06/05/2017     Priority: Medium    Hyperlipidemia LDL goal <100 05/30/2017     Priority: Medium    Anxiety 03/29/2017     Priority: Medium    S/P bilateral mastectomy 02/08/2017     Priority: Medium    Encounter for antineoplastic chemotherapy 10/07/2016     Priority: Medium    Malignant neoplasm of upper-outer quadrant of left breast in female, estrogen receptor negative (H) 10/06/2016     Priority: Medium    Arthritis of shoulder region, right 11/17/2015     Priority: Medium    Adjustment disorder with depressed mood 11/11/2015     Priority: Medium    Baker's cyst of knee 02/09/2015     Priority: Medium    Patella-femoral syndrome 02/09/2015     Priority: Medium    Osteoarthrosis, hip 02/05/2013     Priority: Medium    Tinnitus of right ear 02/05/2013     Priority: Medium    Hypertension goal BP (blood pressure) < 140/90 10/04/2011     Priority: Medium    Trochanteric bursitis 01/06/2009     Priority: Medium    Family history of stroke (cerebrovascular) 03/07/2008     Priority: Medium    Enthesopathy of hip region 01/30/2006     Priority: Medium        Past Medical  History:    Past Medical History:   Diagnosis Date    Acute kidney injury (H24)     Carotid stenosis, bilateral L80%, R40% 09/02/2020    Central stenosis of spinal canal 12/01/2017    DDD (degenerative disc disease), lumbar 12/01/2017    Depressive disorder, not elsewhere classified     Esophageal reflux     ETOH abuse     Foraminal stenosis of lumbar region 12/01/2017    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    Subdural hematoma (H)     Thyroid disease     Urothelial carcinoma (H)        Past Surgical History:    Past Surgical History:   Procedure Laterality Date    ARTHROPLASTY SHOULDER Right 11/17/2015    ARTHROSCOPY KNEE  6/7/2012    Procedure:ARTHROSCOPY KNEE; right knee arthroscopy with partial lateral menisectomy; Surgeon:DAMIÁN MCALLISTER; Location:[facility]    BIOPSY VAGINAL N/A 10/27/2021    Procedure: DISTAL URETHRECTOMY;  Surgeon: Leonardo Robles MD;  Location: [facility] OR    COLONOSCOPY N/A 12/22/2017    Procedure: COLONOSCOPY;  colonoscopy;  Surgeon: Chuck Chand MD;  Location: [facility] GI    CV CORONARY ANGIOGRAM N/A 4/23/2024    Procedure: Coronary Angiogram;  Surgeon: Leonardo Marinelli MD;  Location: [facility] CARDIAC CATH LAB    CV CORONARY ANGIOGRAM N/A 6/13/2024    Procedure: Coronary Angiogram;  Surgeon: Juliette Stevens MD;  Location: [facility] CARDIAC CATH LAB    CV INTRAVASULAR ULTRASOUND N/A 4/23/2024    Procedure: Intravascular Ultrasound;  Surgeon: Leonardo Marinelli MD;  Location:  [facility] CARDIAC CATH LAB    CV LEFT HEART CATH N/A 4/23/2024    Procedure: Left Heart Catheterization;  Surgeon: Leonardo Marinelli MD;  Location:  HEART CARDIAC CATH LAB    CV LEFT HEART CATH N/A 6/13/2024    Procedure: Left Heart Catheterization;  Surgeon: Juliette Stevens MD;   Location:  HEART CARDIAC CATH LAB    CV PCI N/A 6/13/2024    Procedure: Percutaneous Coronary Intervention;  Surgeon: Juliette Stevens MD;  Location:  HEART CARDIAC CATH LAB    CV PCI STENT DRUG ELUTING N/A 4/23/2024    Procedure: Percutaneous Coronary Intervention Stent;  Surgeon: Leonardo Marinelli MD;  Location:  HEART CARDIAC CATH LAB    CYSTECTOMY BLADDER RADICAL, ILEAL DIVERSION, COMBINED N/A 6/1/2021    Procedure: RADICAL CYSTECTOMY  WITH ILEAL CONDUIT CREATION,BILATERAL PELVIC LYMPHADENECTOMY;  Surgeon: Leonardo Robles MD;  Location: [facility] OR    CYSTOSCOPY, RETROGRADES, COMBINED Bilateral 3/18/2021    Procedure: CYSTOSCOPY, WITH RETROGRADE PYELOGRAM;  Surgeon: Girish Jack MD;  Location: [facility] OR    CYSTOSCOPY, TRANSURETHRAL RESECTION (TUR) TUMOR BLADDER, COMBINED N/A 3/18/2021    Procedure: CYSTOSCOPY, WITH TRANSURETHRAL RESECTION BLADDER TUMOR;  Surgeon: Girish Jack MD;  Location: [facility] OR    ENDARTERECTOMY CAROTID Left 10/8/2020    Procedure: LEFT CAROTID ENDARTERECTOMY WITH EEG;  Surgeon: Lacho Jasmine MD;  Location:  OR    ESOPHAGOSCOPY, GASTROSCOPY, DUODENOSCOPY (EGD), COMBINED N/A 6/20/2022    Procedure: ESOPHAGOGASTRODUODENOSCOPY, WITH BIOPSY;  Surgeon: Ramses Arenas MD;  Location:  GI    EXCISE MASS AXILLA Left 9/16/2016    Procedure: EXCISE MASS AXILLA;  Surgeon: Keyon Blanco MD;  [facility]    HC REMV CATARACT EXTRACAP,INSERT LENS, W/O ECP  6/25/2009    Right eye    INJECT EPIDURAL LUMBAR N/A 4/11/2019    Procedure: interlaminar epidual steroid injection lumbar 4-5;  Surgeon: Oskar Salvador MD;  [facility]    INJECT EPIDURAL LUMBAR Bilateral 9/12/2019    Procedure: lumbar 4-5 epidural interlaminar steroid injection;  Surgeon: Oskar Salvador MD;  [facility]: [facility]    INSERT PORT VASCULAR ACCESS Right 10/7/2016    Procedure: INSERT PORT VASCULAR ACCESS;  Surgeon: Keyon Blanco MD;  [facility]: [facility]    IR CHEST PORT PLACEMENT > 5 YRS OF AGE   2021    MASTECTOMY SIMPLE BILATERAL Bilateral 2017    Procedure: MASTECTOMY SIMPLE BILATERAL;  Surgeon: Keyon Blanco MD;  [facility]: [facility]    OPEN REDUCTION INTERNAL FIXATION FOOT Right 3/20/2015    Procedure: OPEN REDUCTION INTERNAL FIXATION FOOT;  Surgeon: Javi Herrmann DPM;  Location: [facility]    OPTICAL TRACKING SYSTEM FUSION SPINE POSTERIOR LUMBAR TWO LEVELS N/A 2020    Procedure: LUMBAR 2-SACRAL1 TRANSFORMINAL INTERBODY FUSION;  Surgeon: Lenny Gomes MD;  Location: [facility] OR    REMOVE PORT VASCULAR ACCESS Right 2018    Procedure: REMOVE PORT VASCULAR ACCESS;  right intra jugular port removal;  Surgeon: Keyon Blanco MD;  [facility]: [facility]    SHOULDER SURGERY Right 2015    ZZC LIGATE FALLOPIAN TUBE      ZZC NONSPECIFIC PROCEDURE      ankle fracture surgery       Family History:    Family History   Problem Relation Age of Onset    Hypertension Mother     Thyroid Disease Mother     Cerebrovascular Disease Mother     Hypertension Father     Cerebrovascular Disease Father     Cancer Father         skin    Thyroid Disease Sister     Diabetes Brother         type 2    Depression Sister     Breast Cancer Sister         age 47    Cancer Sister         skin    Cancer Brother         inside nose       Social History:  Marital Status:   [2]  Social History     Tobacco Use    Smoking status: Former     Current packs/day: 0.00     Average packs/day: 3.0 packs/day for 10.0 years (30.0 ttl pk-yrs)     Types: Cigarettes     Start date: 1969     Quit date: 1979     Years since quittin.6     Passive exposure: Never    Smokeless tobacco: Never    Tobacco comments:     approx. 3 packs daily   Vaping Use    Vaping status: Never Used   Substance Use Topics    Alcohol use: Not Currently     Comment: Stopped drinking a few weeks ago (2024)    Drug use: No        Medications:    acetaminophen (TYLENOL) 500 MG tablet  aspirin (ASA) 81 MG chewable tablet  atorvastatin  "(LIPITOR) 80 MG tablet  carvedilol (COREG) 3.125 MG tablet  clopidogrel (PLAVIX) 75 MG tablet  Cyanocobalamin (B-12 PO)  escitalopram (LEXAPRO) 10 MG tablet  ezetimibe (ZETIA) 10 MG tablet  gabapentin (NEURONTIN) 300 MG capsule  isosorbide mononitrate (IMDUR) 30 MG 24 hr tablet  levothyroxine (SYNTHROID/LEVOTHROID) 25 MCG tablet  LORazepam (ATIVAN) 0.5 MG tablet  meclizine (ANTIVERT) 25 MG tablet  nitroGLYcerin (NITROSTAT) 0.4 MG sublingual tablet  oxyCODONE (ROXICODONE) 5 MG tablet  pantoprazole (PROTONIX) 40 MG EC tablet  prochlorperazine (COMPAZINE) 10 MG tablet  senna-docusate (SENEXON-S) 8.6-50 MG tablet  sucralfate (CARAFATE) 1 GM tablet          Review of Systems  All other ROS reviewed and are negative or non-contributory except as stated in HPI.     Physical Exam   BP: 127/85  Pulse: 74  Temp: 98.4  F (36.9  C)  Resp: 20  Height: 165.1 cm (5' 5\")  SpO2: 97 %      Physical Exam  Vitals and nursing note reviewed.   Constitutional:       Appearance: Normal appearance. She is normal weight.      Comments: Very pleasant, healthy-appearing older female sitting in the bed   HENT:      Head: Normocephalic.      Nose: Nose normal.      Mouth/Throat:      Mouth: Mucous membranes are moist.      Pharynx: Oropharynx is clear.   Eyes:      Extraocular Movements: Extraocular movements intact.      Conjunctiva/sclera: Conjunctivae normal.   Cardiovascular:      Rate and Rhythm: Normal rate and regular rhythm.      Pulses: Normal pulses.      Heart sounds: Normal heart sounds.      Comments: Patient has port in place  Pulmonary:      Effort: Pulmonary effort is normal.      Breath sounds: Normal breath sounds.   Musculoskeletal:         General: No tenderness. Normal range of motion.      Cervical back: Normal range of motion and neck supple.      Right lower leg: No edema.      Left lower leg: No edema.   Skin:     General: Skin is warm and dry.      Coloration: Skin is not pale.      Comments: Status post mastectomy "   Neurological:      General: No focal deficit present.      Mental Status: She is alert and oriented to person, place, and time.   Psychiatric:         Mood and Affect: Mood normal.         Behavior: Behavior normal.         ED Course (with Medical Decision Making)    Pt seen and examined by me.  RN and EPIC notes reviewed.       Patient with hypotension.  She has been taking blood pressure medications and Imdur.  She has had recent stenting.  She has been doing cardiac rehab and active at the gym.  In the ED, her blood pressures have been within normal limits.    I did question her about her cough at home.  She states that she compared it to paramedics and that it was equal.  She only does blood pressures on her right arm because of her mastectomy history.    Orthostatic blood pressures were done and do show lying blood pressure 141/86, standing blood pressure 119/72 without change in heart rate.  This makes sense with her beta-blocker on board.    I am wondering if she just has labile blood pressures, if she is very sensitive to the Coreg or the Imdur, Plavix has been known to decrease blood pressures, or if there is some other issue at play here.  I drew basic labs.  Patient's CMP shows elevated BUN/creatinine 26.6/1.10.  This is definitely a little bit higher than previous creatinine 6/15/2024 which was 1.  She may be a little bit dry.  She was given half liter of IV fluids.  Normal white blood cell count, hemoglobin 10.1.  Normal magnesium.    Patient's blood pressures have been normal to mildly elevated in the ED.  She continues to have serious headache.  I tried oxycodone which did not relieve her headache.  Because she is on Plavix I elected to do a head CT.  I reviewed the scan and the read from radiologist, no acute intracranial abnormality noted.  Patient was given 1 dose of Dilaudid IV which completely improved her headache.    She has an appointment with her cardiologist in 4 days.  I recommend  continuing to monitor blood pressures, keep a journal.  If she has blood pressures less than around 125 systolic in the morning she should hold off on her Coreg, okay to take Imdur.  Closely monitor and if her blood pressures start to creep up she can take her evening Coreg dose.  Since she has the appointment in just a few days I will not recommend any other drastic changes.  However if she has any significant worsening, changes or concerns she should return to the ED at any time.        Procedures    Results for orders placed or performed during the hospital encounter of 07/06/24 (from the past 24 hour(s))   CBC with platelets differential    Narrative    The following orders were created for panel order CBC with platelets differential.  Procedure                               Abnormality         Status                     ---------                               -----------         ------                     CBC with platelets and d...[[id number]]  Abnormal            Final result                 Please view results for these tests on the individual orders.   Comprehensive metabolic panel   Result Value Ref Range    Sodium 137 135 - 145 mmol/L    Potassium 4.2 3.4 - 5.3 mmol/L    Carbon Dioxide (CO2) 21 (L) 22 - 29 mmol/L    Anion Gap 12 7 - 15 mmol/L    Urea Nitrogen 26.6 (H) 8.0 - 23.0 mg/dL    Creatinine 1.10 (H) 0.51 - 0.95 mg/dL    GFR Estimate 52 (L) >60 mL/min/1.73m2    Calcium 9.6 8.8 - 10.2 mg/dL    Chloride 104 98 - 107 mmol/L    Glucose 87 70 - 99 mg/dL    Alkaline Phosphatase 58 40 - 150 U/L    AST 24 0 - 45 U/L    ALT 24 0 - 50 U/L    Protein Total 6.8 6.4 - 8.3 g/dL    Albumin 4.2 3.5 - 5.2 g/dL    Bilirubin Total 0.2 <=1.2 mg/dL   Magnesium   Result Value Ref Range    Magnesium 1.9 1.7 - 2.3 mg/dL   CBC with platelets and differential   Result Value Ref Range    WBC Count 7.4 4.0 - 11.0 10e3/uL    RBC Count 3.02 (L) 3.80 - 5.20 10e6/uL    Hemoglobin 10.1 (L) 11.7 - 15.7 g/dL    Hematocrit 30.6 (L)  35.0 - 47.0 %     (H) 78 - 100 fL    MCH 33.4 (H) 26.5 - 33.0 pg    MCHC 33.0 31.5 - 36.5 g/dL    RDW 14.4 10.0 - 15.0 %    Platelet Count 264 150 - 450 10e3/uL    % Neutrophils 50 %    % Lymphocytes 34 %    % Monocytes 9 %    % Eosinophils 7 %    % Basophils 1 %    % Immature Granulocytes 0 %    NRBCs per 100 WBC 0 <1 /100    Absolute Neutrophils 3.7 1.6 - 8.3 10e3/uL    Absolute Lymphocytes 2.5 0.8 - 5.3 10e3/uL    Absolute Monocytes 0.6 0.0 - 1.3 10e3/uL    Absolute Eosinophils 0.5 0.0 - 0.7 10e3/uL    Absolute Basophils 0.1 0.0 - 0.2 10e3/uL    Absolute Immature Granulocytes 0.0 <=0.4 10e3/uL    Absolute NRBCs 0.0 10e3/uL   Head CT w/o contrast    Narrative    EXAM: CT HEAD W/O CONTRAST  [facility]  DATE: 7/6/2024    INDICATION: Severe headache, on blood thinners; Headache; Acute HA (< 3 months), no complicating features.  COMPARISON: None.  TECHNIQUE: Routine CT Head without IV contrast. Multiplanar reformats. Dose reduction techniques were used.    FINDINGS:  INTRACRANIAL CONTENTS: No intracranial hemorrhage, extraaxial collection, or mass effect.  No CT evidence of acute infarct. Mild presumed chronic small vessel ischemic changes. Mild generalized volume loss. No hydrocephalus.     VISUALIZED ORBITS/SINUSES/MASTOIDS: No intraorbital abnormality. No paranasal sinus mucosal disease. No middle ear or mastoid effusion.    BONES/SOFT TISSUES: No acute abnormality.      Impression    IMPRESSION:  1.  No CT evidence for acute intracranial process.  2.  Mild chronic microvascular ischemic changes as above.     *Note: Due to a large number of results and/or encounters for the requested time period, some results have not been displayed. A complete set of results can be found in Results Review.       Medications   sodium chloride 0.9% BOLUS 500 mL (0 mLs Intravenous Stopped 7/6/24 1834)   oxyCODONE (ROXICODONE) tablet 5 mg (5 mg Oral $Given 7/6/24 1639)   HYDROmorphone  (PF) (DILAUDID) injection 0.5 mg (0.5 mg Intravenous $Given 7/6/24 8488)       Assessments & Plan      I have reviewed the findings, diagnosis, plan and need for follow up with the patient.    Discharge Medication List as of 7/6/2024  6:46 PM          Final diagnoses:   Labile blood pressure   Acute nonintractable headache, unspecified headache type     Disposition: Patient discharged home in stable condition.  Plan as above.  Return for concerns.     Note: Chart documentation done in part with Dragon Voice Recognition software. Although reviewed after completion, some word and grammatical errors may remain.     7/6/2024   [facility] EMERGENCY DEPT       Sarah Alegria MD  07/07/24 0126

## 2024-07-08 ENCOUNTER — MYC MEDICAL ADVICE (OUTPATIENT)
Dept: CARDIOLOGY | Facility: CLINIC | Age: 74
End: 2024-07-08
Payer: COMMERCIAL

## 2024-07-08 ENCOUNTER — HOSPITAL ENCOUNTER (OUTPATIENT)
Dept: CARDIAC REHAB | Facility: CLINIC | Age: 74
Discharge: HOME | End: 2024-07-08
Attending: INTERNAL MEDICINE
Payer: MEDICARE

## 2024-07-08 PROCEDURE — 93798 PHYS/QHP OP CAR RHAB W/ECG: CPT

## 2024-07-08 NOTE — TELEPHONE ENCOUNTER
Future Office Visit:   Next 5 appointments (look out 90 days)      Jul 26, 2024 2:30 PM  (Arrive by 2:10 PM)  Provider Visit with Phani Albright PA-C  [facility] ([facility] ) [address] [id number] [phone]             Requested Prescriptions   Pending Prescriptions Disp Refills    LORazepam (ATIVAN) 0.5 MG tablet [Pharmacy Med Name: LORAZEPAM 0.5MG TABLET] 20 tablet 0     Sig: TAKE 1 TABLET (0.5 MG) BY MOUTH EVERY 4 HOURS AS NEEDED FOR ANXIETY, NAUSEA OR SLEEP       There is no refill protocol information for this order          Unable to fill per RN protocol, will forward to provider for review.         Carol Rollins RN on 7/8/2024 at 4:12 PM

## 2024-07-09 ENCOUNTER — OFFICE VISIT (OUTPATIENT)
Dept: CARDIOLOGY | Facility: CLINIC | Age: 74
End: 2024-07-09
Payer: COMMERCIAL

## 2024-07-09 ENCOUNTER — MYC MEDICAL ADVICE (OUTPATIENT)
Dept: FAMILY MEDICINE | Facility: OTHER | Age: 74
End: 2024-07-09

## 2024-07-09 VITALS
DIASTOLIC BLOOD PRESSURE: 50 MMHG | OXYGEN SATURATION: 94 % | HEART RATE: 86 BPM | WEIGHT: 143 LBS | BODY MASS INDEX: 23.82 KG/M2 | SYSTOLIC BLOOD PRESSURE: 90 MMHG | HEIGHT: 65 IN

## 2024-07-09 DIAGNOSIS — I10 HYPERTENSION GOAL BP (BLOOD PRESSURE) < 140/90: Primary | ICD-10-CM

## 2024-07-09 DIAGNOSIS — E78.5 HYPERLIPIDEMIA LDL GOAL <70: ICD-10-CM

## 2024-07-09 DIAGNOSIS — I25.10 CORONARY ARTERY DISEASE INVOLVING NATIVE CORONARY ARTERY OF NATIVE HEART WITHOUT ANGINA PECTORIS: ICD-10-CM

## 2024-07-09 DIAGNOSIS — F43.21 ADJUSTMENT DISORDER WITH DEPRESSED MOOD: ICD-10-CM

## 2024-07-09 PROCEDURE — 99214 OFFICE O/P EST MOD 30 MIN: CPT

## 2024-07-09 PROCEDURE — G2211 COMPLEX E/M VISIT ADD ON: HCPCS

## 2024-07-09 RX ORDER — LORAZEPAM 0.5 MG/1
0.5 TABLET ORAL EVERY 4 HOURS PRN
Qty: 40 TABLET | Refills: 0 | Status: SHIPPED | OUTPATIENT
Start: 2024-07-09 | End: 2024-07-26

## 2024-07-09 RX ORDER — CARVEDILOL 3.12 MG/1
3.12 TABLET ORAL 2 TIMES DAILY WITH MEALS
COMMUNITY
Start: 2024-07-09 | End: 2024-08-12

## 2024-07-09 RX ORDER — LORAZEPAM 0.5 MG/1
0.5 TABLET ORAL EVERY 4 HOURS PRN
Qty: 15 TABLET | Refills: 0 | Status: SHIPPED | OUTPATIENT
Start: 2024-07-09

## 2024-07-09 ASSESSMENT — PAIN SCALES - GENERAL: PAINLEVEL: NO PAIN (0)

## 2024-07-09 NOTE — LETTER
7/9/2024    Phani Albright PA-C  [address]  [facility] [address]    RE: Michaela Dorman       Dear Colleague,     I had the pleasure of seeing Michaela Dorman in the [facility].              ~Cardiology Clinic Visit~    Michaela Dorman MRN# [id number]   YOB: 1950 Age: 73 year old   Primary Cardiologist: Dr. Choi            Assessment and Plan:   Michaela Dorman is a very pleasant 73 year old female who is here today for post hospital follow up      CAD, without angina  -Coronary angiogram 4/2024- PCI with AMBREEN x2 to the CHLOE  -Coronary angiogram 6/2024-  residual moderate stenosis of the LCx and stenosis distal to the previously placed stents was evaluated with iFR which was not hemodynamically significant (0.96). The mid LAD lesion was negative by iFR (0.94).     DAPT: Aspirin 81 mg daily, clopidogrel 75 mg daily  Statin: Atorvastatin 80 mg daily  BB: Coreg 3.125 mg twice daily  ACE/ARB: None   Cardiac rehab: Attending    Labile blood pressure   HLD  Lipid profile 4/2024-  and LDL 85  History of bladder cancer   CKD   Carotid artery disease s/p CEA 3/2021    Discontinue Imdur 15 mg daily.  Encouraged patient to continue to monitor her blood pressure at home. Instructed her to update the clinic if her blood pressure is lower than 90 systolic.     Follow up with CORI in 2 weeks       Anais Payne PA-C  Physician Assistant   [facility]  Pager: [phone]          History of Presenting Illness:    Michaela Dorman is a very pleasant 73 year old female with a history of CAD (status post PCI of the first obtuse marginal branch with known residual significant circumflex disease and moderate LAD disease), HTN, HLD, history of bladder cancer, CKD, and carotid artery disease (s/p CEA 2021).     In brief, patient was recently hospitalized at [facility] from 6/12-6/14/2024 for unstable angina. Coronary angiogram demonstrated residual moderate  "stenosis of the LCx and stenosis distal to the previously placed stents was evaluated with iFR which was not hemodynamically significant (0.96). The mid LAD lesion was negative by iFR (0.94). Previous placed OM stents were patent.     Echocardiogram noted LVEF 55-60% with early diastolic dysfunction.     Patient reports she has had a \"tough week\".  Last week she had a blood pressure reading in the 60/40s. EMS came to her house and her blood pressure improved. However, two days later patient had another low blood pressure reading and was evaluated in the emergency room. She was instructed to hold on taking her Coreg if her blood pressure was <125. Patient has not taken her Coreg for the past two days. Yesterday, she noted her blood pressures to be normal. Again, this morning prior to her medications her blood pressure was in the 120s then dropped to 60/40 after her morning medications. When her blood pressure gets this low she becomes lightheaded.     Denies shortness of breath, orthopnea and PND. Denies chest pain, palpitations, near syncope and syncope. Denies epistaxis, ecchymosis, and melena.     Taking medications daily as prescribed.      Blood pressure 90/50 and HR 86 in clinic today.          Social History       Social History     Socioeconomic History    Marital status:      Spouse name: ozzie    Number of children: 5    Years of education: Not on file    Highest education level: Not on file   Occupational History     Employer: [employer]   Tobacco Use    Smoking status: Former     Current packs/day: 0.00     Average packs/day: 3.0 packs/day for 10.0 years (30.0 ttl pk-yrs)     Types: Cigarettes     Start date: 1969     Quit date: 1979     Years since quittin.6     Passive exposure: Never    Smokeless tobacco: Never    Tobacco comments:     approx. 3 packs daily   Vaping Use    Vaping status: Never Used   Substance and Sexual Activity    Alcohol use: Not Currently     Comment: Stopped " drinking a few weeks ago (july 2023)    Drug use: No    Sexual activity: Yes     Partners: Male   Other Topics Concern     Service Not Asked    Blood Transfusions Not Asked    Caffeine Concern No    Occupational Exposure Not Asked    Hobby Hazards No    Sleep Concern No    Stress Concern Yes    Weight Concern Not Asked    Special Diet Not Asked    Back Care Not Asked    Exercise Yes    Bike Helmet Not Asked    Seat Belt Yes    Self-Exams Not Asked    Parent/sibling w/ CABG, MI or angioplasty before 65F 55M? Not Asked   Social History Narrative    Not on file     Social Determinants of Health     Financial Resource Strain: Low Risk  (4/25/2024)    Financial Resource Strain     Within the past 12 months, have you or your family members you live with been unable to get utilities (heat, electricity) when it was really needed?: No   Food Insecurity: Low Risk  (4/25/2024)    Food Insecurity     Within the past 12 months, did you worry that your food would run out before you got money to buy more?: No     Within the past 12 months, did the food you bought just not last and you didn t have money to get more?: No   Transportation Needs: Low Risk  (4/25/2024)    Transportation Needs     Within the past 12 months, has lack of transportation kept you from medical appointments, getting your medicines, non-medical meetings or appointments, work, or from getting things that you need?: No   Physical Activity: Sufficiently Active (4/25/2024)    Exercise Vital Sign     Days of Exercise per Week: 5 days     Minutes of Exercise per Session: 100 min   Stress: Stress Concern Present (4/25/2024)    [place] citizen [place] [employer] - Occupational Stress Questionnaire     Feeling of Stress : To some extent   Social Connections: Unknown (4/25/2024)    Social Connection and Isolation Panel [NHANES]     Frequency of Communication with Friends and Family: Not on file     Frequency of Social Gatherings with Friends and Family:  Once a week     Attends Anabaptism Services: Not on file     Active Member of Clubs or Organizations: Not on file     Attends Club or Organization Meetings: Not on file     Marital Status: Not on file   Interpersonal Safety: Low Risk  (4/25/2024)    Interpersonal Safety     Do you feel physically and emotionally safe where you currently live?: Yes     Within the past 12 months, have you been hit, slapped, kicked or otherwise physically hurt by someone?: No     Within the past 12 months, have you been humiliated or emotionally abused in other ways by your partner or ex-partner?: No   Housing Stability: Low Risk  (4/25/2024)    Housing Stability     Do you have housing? : Yes     Are you worried about losing your housing?: No            Review of Systems:   Please see HPI         Physical Exam:   Vitals: There were no vitals taken for this visit.   Wt Readings from Last 4 Encounters:   06/15/24 65.5 kg (144 lb 4.8 oz)   05/28/24 65.8 kg (145 lb)   05/07/24 63.5 kg (140 lb)   05/06/24 64.4 kg (142 lb)     GEN: well nourished, in no acute distress.  NECK: Supple. JVP was not appreciated. Bilateral carotid arteries without bruits.  C/V:  Regular rate and rhythm, no murmur, rub or gallop.    RESP: Respirations are unlabored. Clear to auscultation bilaterally without wheezing, rales, or rhonchi.  EXTREM: Bilateral lower extremities with no edema.   SKIN: Warm and dry.        Data:   LIPID RESULTS:  Lab Results   Component Value Date    CHOL 194 04/29/2024    CHOL 199 10/08/2020    HDL 88 04/29/2024    HDL 76 10/08/2020    LDL 85 04/29/2024    LDL 87 10/08/2020    TRIG 106 04/29/2024    TRIG 180 (H) 10/08/2020    CHOLHDLRATIO 3.0 08/02/2010     LIVER ENZYME RESULTS:  Lab Results   Component Value Date    AST 24 03/20/2024    AST 20 05/21/2021    ALT 36 04/29/2024    ALT 38 05/21/2021     CBC RESULTS:  Lab Results   Component Value Date    WBC 7.3 06/15/2024    WBC 13.5 (H) 06/24/2021    RBC 3.12 (L) 06/15/2024    RBC 2.99  (L) 06/24/2021    HGB 10.6 (L) 06/15/2024    HGB 9.6 (L) 06/24/2021    HCT 32.5 (L) 06/15/2024    HCT 30.0 (L) 06/24/2021     (H) 06/15/2024     06/24/2021    MCH 34.0 (H) 06/15/2024    MCH 32.1 06/24/2021    MCHC 32.6 06/15/2024    MCHC 32.0 06/24/2021    RDW 14.6 06/15/2024    RDW 14.8 06/24/2021     06/15/2024     06/24/2021     BMP RESULTS:  Lab Results   Component Value Date     06/15/2024     06/24/2021    POTASSIUM 4.2 06/15/2024    POTASSIUM 4.8 11/28/2022    POTASSIUM 4.7 06/24/2021    CHLORIDE 107 06/15/2024    CHLORIDE 108 11/28/2022    CHLORIDE 106 06/24/2021    CO2 23 06/15/2024    CO2 26 11/28/2022    CO2 24 06/24/2021    ANIONGAP 11 06/15/2024    ANIONGAP 4 11/28/2022    ANIONGAP 6 06/24/2021    GLC 83 06/15/2024    GLC 93 06/14/2024    GLC 90 11/28/2022     (H) 06/24/2021    BUN 20.6 06/15/2024    BUN 28 11/28/2022    BUN 30 06/24/2021    CR 1.01 (H) 06/15/2024    CR 1.10 (H) 06/24/2021    GFRESTIMATED 58 (L) 06/15/2024    GFRESTIMATED 48 (L) 03/07/2023    GFRESTIMATED 50 (L) 06/24/2021    GFRESTBLACK 58 (L) 06/24/2021    VANDANA 9.7 06/15/2024    VANDANA 9.6 06/24/2021      A1C RESULTS:  Lab Results   Component Value Date    A1C 5.4 07/26/2023    A1C 5.2 10/08/2020     INR RESULTS:  Lab Results   Component Value Date    INR 1.02 06/13/2024    INR 0.93 06/12/2024    INR 0.91 04/16/2021    INR 0.9 10/01/2020    INR 0.88 02/12/2018            Medications     Current Outpatient Medications   Medication Sig Dispense Refill    acetaminophen (TYLENOL) 500 MG tablet Take 1,000 mg by mouth 3 times daily as needed for mild pain (500MG X 2 = 1,000MG)      aspirin (ASA) 81 MG chewable tablet Take 1 tablet (81 mg) by mouth daily 100 tablet 3    atorvastatin (LIPITOR) 80 MG tablet Take 80 mg by mouth daily      carvedilol (COREG) 3.125 MG tablet Take 1 tablet (3.125 mg) by mouth 2 times daily (with meals) 60 tablet 1    clopidogrel (PLAVIX) 75 MG tablet Take 1 tablet (75 mg)  by mouth daily 90 tablet 3    Cyanocobalamin (B-12 PO) Take 1 tablet by mouth daily      escitalopram (LEXAPRO) 10 MG tablet Take 1 tablet (10 mg) by mouth daily (Patient taking differently: Take 5 mg by mouth daily) 90 tablet 3    ezetimibe (ZETIA) 10 MG tablet Take 1 tablet (10 mg) by mouth daily 90 tablet 3    gabapentin (NEURONTIN) 300 MG capsule Take 2 capsules (600 mg) by mouth 3 times daily 180 capsule 5    isosorbide mononitrate (IMDUR) 30 MG 24 hr tablet Take 0.5 tablets (15 mg) by mouth daily      levothyroxine (SYNTHROID/LEVOTHROID) 25 MCG tablet Take 1 tablet (25 mcg) by mouth daily 90 tablet 1    LORazepam (ATIVAN) 0.5 MG tablet Take 1 tablet (0.5 mg) by mouth every 4 hours as needed for anxiety, nausea or sleep 20 tablet 0    meclizine (ANTIVERT) 25 MG tablet Take 12.5 mg by mouth every morning 1/2 tablet (to mitigate nausea from morning meds)      nitroGLYcerin (NITROSTAT) 0.4 MG sublingual tablet For chest pain place 1 tablet under the tongue every 5 minutes for 3 doses. If symptoms persist 5 minutes after 2nd dose call 911. 30 tablet 0    oxyCODONE (ROXICODONE) 5 MG tablet TAKE 1 TABLET (5 MG) BY MOUTH EVERY 6 HOURS AS NEEDED FOR MODERATE TO SEVERE PAIN 30 tablet 0    pantoprazole (PROTONIX) 40 MG EC tablet Take 1 tablet (40 mg) by mouth daily 90 tablet 3    prochlorperazine (COMPAZINE) 10 MG tablet Take 5 mg by mouth every 6 hours as needed for nausea or vomiting      senna-docusate (SENEXON-S) 8.6-50 MG tablet Take 2 tablets by mouth at bedtime 100 tablet 0    sucralfate (CARAFATE) 1 GM tablet Take 1 g by mouth 2 times daily as needed for nausea (usually takes before supper)            Past Medical History     Past Medical History:   Diagnosis Date    Acute kidney injury (H24)     Carotid stenosis, bilateral L80%, R40% 09/02/2020    Central stenosis of spinal canal 12/01/2017    lumbar     DDD (degenerative disc disease), lumbar 12/01/2017    Depressive disorder, not elsewhere classified      Esophageal reflux     ETOH abuse     in remission    Foraminal stenosis of lumbar region 12/01/2017    Complete lumbar spine left at various degrees and to a lesser extent the right as well.    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    Subdural hematoma (H)     Thyroid disease     Urothelial carcinoma (H)      Past Surgical History:   Procedure Laterality Date    ARTHROPLASTY SHOULDER Right 11/17/2015    ARTHROSCOPY KNEE  6/7/2012    Procedure:ARTHROSCOPY KNEE; right knee arthroscopy with partial lateral menisectomy; Surgeon:DAMIÁN MCALLISTER; Location:[facility] OR    BIOPSY VAGINAL N/A 10/27/2021    Procedure: DISTAL URETHRECTOMY;  Surgeon: Leonardo Robles MD;  Location: [facility] OR    COLONOSCOPY N/A 12/22/2017    Procedure: COLONOSCOPY;  colonoscopy;  Surgeon: Chuck Chand MD;  Location: [facility] GI    CV CORONARY ANGIOGRAM N/A 4/23/2024    Procedure: Coronary Angiogram;  Surgeon: Leonardo Marinelli MD;  Location: [facility] CARDIAC CATH LAB    CV CORONARY ANGIOGRAM N/A 6/13/2024    Procedure: Coronary Angiogram;  Surgeon: Juliette Stevens MD;  Location: [facility] CARDIAC CATH LAB    CV INTRAVASULAR ULTRASOUND N/A 4/23/2024    Procedure: Intravascular Ultrasound;  Surgeon: Leonardo Marinelli MD;  Location:  HEART CARDIAC CATH LAB    CV LEFT HEART CATH N/A 4/23/2024    Procedure: Left Heart Catheterization;  Surgeon: Leonardo Marinelli MD;  Location:  HEART CARDIAC CATH LAB    CV LEFT HEART CATH N/A 6/13/2024    Procedure: Left Heart Catheterization;  Surgeon: Juliette Stevens MD;  Location: [facility] CARDIAC CATH LAB    CV PCI N/A 6/13/2024    Procedure: Percutaneous Coronary Intervention;  Surgeon: Juliette Stevens MD;  Location: [facility] CARDIAC CATH LAB    CV PCI STENT DRUG ELUTING N/A  4/23/2024    Procedure: Percutaneous Coronary Intervention Stent;  Surgeon: Leonardo Marinelli MD;  Location:  HEART CARDIAC CATH LAB    CYSTECTOMY BLADDER RADICAL, ILEAL DIVERSION, COMBINED N/A 6/1/2021    Procedure: RADICAL CYSTECTOMY  WITH ILEAL CONDUIT CREATION,BILATERAL PELVIC LYMPHADENECTOMY;  Surgeon: Leonardo Robles MD;  Location: [facility] OR    CYSTOSCOPY, RETROGRADES, COMBINED Bilateral 3/18/2021    Procedure: CYSTOSCOPY, WITH RETROGRADE PYELOGRAM;  Surgeon: Girish Jack MD;  Location: [facility] OR    CYSTOSCOPY, TRANSURETHRAL RESECTION (TUR) TUMOR BLADDER, COMBINED N/A 3/18/2021    Procedure: CYSTOSCOPY, WITH TRANSURETHRAL RESECTION BLADDER TUMOR;  Surgeon: Girish Jack MD;  Location: [facility] OR    ENDARTERECTOMY CAROTID Left 10/8/2020    Procedure: LEFT CAROTID ENDARTERECTOMY WITH EEG;  Surgeon: Lacho Jasmine MD;  Location:  OR    ESOPHAGOSCOPY, GASTROSCOPY, DUODENOSCOPY (EGD), COMBINED N/A 6/20/2022    Procedure: ESOPHAGOGASTRODUODENOSCOPY, WITH BIOPSY;  Surgeon: Ramses Arenas MD;  Location:  GI    EXCISE MASS AXILLA Left 9/16/2016    Procedure: EXCISE MASS AXILLA;  Surgeon: Keyon Blanco MD;  Location: [facility]    HC REMV CATARACT EXTRACAP,INSERT LENS, W/O ECP  6/25/2009    Right eye    INJECT EPIDURAL LUMBAR N/A 4/11/2019    Procedure: interlaminar epidual steroid injection lumbar 4-5;  Surgeon: Oskar Salvador MD;  [facility]: [facility] OR    INJECT EPIDURAL LUMBAR Bilateral 9/12/2019    Procedure: lumbar 4-5 epidural interlaminar steroid injection;  Surgeon: Oskar Salvador MD;  [facility]: [facility]    INSERT PORT VASCULAR ACCESS Right 10/7/2016    Procedure: INSERT PORT VASCULAR ACCESS;  Surgeon: Keyon Blanco MD;  Location: [facility] OR    IR CHEST PORT PLACEMENT > 5 YRS OF AGE  4/16/2021    MASTECTOMY SIMPLE BILATERAL Bilateral 2/8/2017    Procedure: MASTECTOMY SIMPLE BILATERAL;  Surgeon: Keyon Blanco MD;  Location: [facility] OR    OPEN REDUCTION INTERNAL FIXATION FOOT Right 3/20/2015     Procedure: OPEN REDUCTION INTERNAL FIXATION FOOT;  Surgeon: Javi Herrmann DPM;  Location:  OR    OPTICAL TRACKING SYSTEM FUSION SPINE POSTERIOR LUMBAR TWO LEVELS N/A 2/4/2020    Procedure: LUMBAR 2-SACRAL1 TRANSFORMINAL INTERBODY FUSION;  Surgeon: Lenny Gomes MD;  Location:  OR    REMOVE PORT VASCULAR ACCESS Right 2/14/2018    Procedure: REMOVE PORT VASCULAR ACCESS;  right intra jugular port removal;  Surgeon: Keyon Blanco MD;  [facility]: [facility]    SHOULDER SURGERY Right 11/2015    ZZ LIGATE FALLOPIAN TUBE      Z NONSPECIFIC PROCEDURE  1956    ankle fracture surgery     Family History   Problem Relation Age of Onset    Hypertension Mother     Thyroid Disease Mother     Cerebrovascular Disease Mother     Hypertension Father     Cerebrovascular Disease Father     Cancer Father         skin    Thyroid Disease Sister     Diabetes Brother         type 2    Depression Sister     Breast Cancer Sister         age 47    Cancer Sister         skin    Cancer Brother         inside nose            Allergies   Oxybutynin      This note was completed in part using dictation via the Dragon voice recognition software. Some word and grammatical errors may occur and must be interpreted in the appropriate clinical context.  If there are any questions pertaining to this issue, please contact me for further clarification.      Thank you for allowing me to participate in the care of your patient.      Sincerely,     Anais Payne PA-C     [facility]  cc:   Referred Self,

## 2024-07-09 NOTE — PATIENT INSTRUCTIONS
-  Thank you for your visit with the [facility] today.    Today's plan:   Medication changes: stop taking imdur, continue coreg 3.125 mg BID   Continue monitoring blood pressure at home. Update the clinic if the top number is less than 90   Follow up with CORI in 2 weeks     If you have questions or concerns please call the nurse team at [phone] or send a [employer] message.     Scheduling phone number: [phone]    On Fridays call: [phone] as the Heart Care Clinic office is closed     It was a pleasure seeing you today!     Anais Payne PA-C   Physician Assistant   [facility] -

## 2024-07-10 ENCOUNTER — MYC MEDICAL ADVICE (OUTPATIENT)
Dept: CARDIOLOGY | Facility: CLINIC | Age: 74
End: 2024-07-10
Payer: COMMERCIAL

## 2024-07-12 NOTE — TELEPHONE ENCOUNTER
Writer called and spoke to patient who reports her BP has decreased to 110's systolic. Reports she does not have a headache and feels fine. Patient reminded of instructions to stop taking imdur and to continue coreg 3.125 mg twice daily. Encouraged to continue monitoring blood pressure at home and recording the results to monitor for trends. Instructed to update the clinic if the top number is less than 90. Patient verbalizes understanding of information and appreciates call.

## 2024-07-13 DIAGNOSIS — M54.16 LUMBAR RADICULOPATHY: ICD-10-CM

## 2024-07-13 DIAGNOSIS — G89.4 CHRONIC PAIN SYNDROME: ICD-10-CM

## 2024-07-13 DIAGNOSIS — M51.369 DDD (DEGENERATIVE DISC DISEASE), LUMBAR: ICD-10-CM

## 2024-07-13 DIAGNOSIS — M48.061 FORAMINAL STENOSIS OF LUMBAR REGION: ICD-10-CM

## 2024-07-15 ENCOUNTER — MYC MEDICAL ADVICE (OUTPATIENT)
Dept: CARDIOLOGY | Facility: CLINIC | Age: 74
End: 2024-07-15
Payer: COMMERCIAL

## 2024-07-15 ENCOUNTER — HOSPITAL ENCOUNTER (OUTPATIENT)
Dept: CARDIAC REHAB | Facility: CLINIC | Age: 74
Discharge: HOME | End: 2024-07-15
Attending: INTERNAL MEDICINE
Payer: MEDICARE

## 2024-07-15 DIAGNOSIS — R07.9 CHEST PAIN, UNSPECIFIED TYPE: ICD-10-CM

## 2024-07-15 DIAGNOSIS — I25.10 CORONARY ARTERY DISEASE INVOLVING NATIVE CORONARY ARTERY OF NATIVE HEART WITHOUT ANGINA PECTORIS: Primary | ICD-10-CM

## 2024-07-15 DIAGNOSIS — I10 HYPERTENSION GOAL BP (BLOOD PRESSURE) < 140/90: ICD-10-CM

## 2024-07-15 PROCEDURE — 93798 PHYS/QHP OP CAR RHAB W/ECG: CPT

## 2024-07-15 RX ORDER — OXYCODONE HYDROCHLORIDE 5 MG/1
5 TABLET ORAL EVERY 6 HOURS PRN
Qty: 30 TABLET | Refills: 0 | Status: SHIPPED | OUTPATIENT
Start: 2024-07-15 | End: 2024-08-12

## 2024-07-15 NOTE — TELEPHONE ENCOUNTER
Patient sent [employer] message:  Good morning. I have been taking corig and skipping imdur. But this morning at 5:30 I woke with mild chest discomfort. I'm still experiencing it. My bp is 127/67. Can I take an imdur to stop pain. I have cardio rehab at 2 also. Ty     Will route to KINA Marks for further recommendations.         KINA Marks LOV note from 7/9/24:  Michaela Dorman is a very pleasant 73 year old female who is here today for post hospital follow up       CAD, without angina  -Coronary angiogram 4/2024- PCI with AMBREEN x2 to the CHLOE  -Coronary angiogram 6/2024-  residual moderate stenosis of the LCx and stenosis distal to the previously placed stents was evaluated with iFR which was not hemodynamically significant (0.96). The mid LAD lesion was negative by iFR (0.94).     DAPT: Aspirin 81 mg daily, clopidogrel 75 mg daily  Statin: Atorvastatin 80 mg daily  BB: Coreg 3.125 mg twice daily  ACE/ARB: None   Cardiac rehab: Attending     Labile blood pressure   HLD  Lipid profile 4/2024-  and LDL 85  History of bladder cancer   CKD   Carotid artery disease s/p CEA 3/2021     Discontinue Imdur 15 mg daily.  Encouraged patient to continue to monitor her blood pressure at home. Instructed her to update the clinic if her blood pressure is lower than 90 systolic.      Follow up with CORI in 2 weeks         Anais Payne PA-C  Physician Assistant   [facility]- [facility]  Pager: [phone]

## 2024-07-15 NOTE — TELEPHONE ENCOUNTER
Anais Payne PA-C  You     Okay for patient to take a dose of Imdur.    Thanks!     [employer] message sent back to patient.

## 2024-07-17 ENCOUNTER — HOSPITAL ENCOUNTER (OUTPATIENT)
Dept: CARDIAC REHAB | Facility: CLINIC | Age: 74
Discharge: HOME | End: 2024-07-17
Attending: INTERNAL MEDICINE
Payer: MEDICARE

## 2024-07-17 PROCEDURE — 93798 PHYS/QHP OP CAR RHAB W/ECG: CPT

## 2024-07-18 RX ORDER — ISOSORBIDE MONONITRATE 30 MG/1
15 TABLET, EXTENDED RELEASE ORAL DAILY
Qty: 45 TABLET | Refills: 3 | Status: SHIPPED | OUTPATIENT
Start: 2024-07-18 | End: 2024-08-12

## 2024-07-18 NOTE — TELEPHONE ENCOUNTER
Anais Payne PA-C  [facility] [place]    Reviewed patients blood pressure readings. Okay to start imdur 15 mg daily to see if this helps with patients residual chest pain.    Thanks!

## 2024-07-18 NOTE — TELEPHONE ENCOUNTER
Anais Payne PA-C  [facility] [id number] minutes ago (2:18 PM)     KF  Has patient been checking her blood pressure at home?    If her blood pressure has been >100 systolic I would recommend patient start taking imdur 15 mg daily and see if this helps with the intermittent episodes of chest pain.     Wrote pt. Ashok message asking pt. If she checks her BP and if so if we could get a log.     Eli Morrison on 7/18/2024 at 2:36 PM

## 2024-07-18 NOTE — TELEPHONE ENCOUNTER
Ashok message from patient:  I had to take another imdur today for chest pain. I only took a half . It helped little but I still have discomfort in my chest. I did cardio rehab and that was good but after I had some chest pain. About a 2 or 3. Just uncomfortable.      Routing to KINA Marks for further recommendations.

## 2024-07-18 NOTE — TELEPHONE ENCOUNTER
Sent script Imdur 15mg daily to patient's pharmacy as majority of patients blood pressure have been >100 systolic. Will send update to KINA Marks to review patients attached blood pressure readings in [employer] message.

## 2024-07-26 ENCOUNTER — VIRTUAL VISIT (OUTPATIENT)
Dept: FAMILY MEDICINE | Facility: OTHER | Age: 74
End: 2024-07-26
Payer: COMMERCIAL

## 2024-07-26 ENCOUNTER — NURSE TRIAGE (OUTPATIENT)
Dept: CARDIOLOGY | Facility: CLINIC | Age: 74
End: 2024-07-26

## 2024-07-26 DIAGNOSIS — F10.10 ETOH ABUSE: ICD-10-CM

## 2024-07-26 DIAGNOSIS — I10 HYPERTENSION GOAL BP (BLOOD PRESSURE) < 140/90: ICD-10-CM

## 2024-07-26 DIAGNOSIS — E43 UNSPECIFIED SEVERE PROTEIN-CALORIE MALNUTRITION (H): Primary | ICD-10-CM

## 2024-07-26 DIAGNOSIS — I25.10 CORONARY ARTERY DISEASE INVOLVING NATIVE CORONARY ARTERY OF NATIVE HEART WITHOUT ANGINA PECTORIS: ICD-10-CM

## 2024-07-26 DIAGNOSIS — F43.21 ADJUSTMENT DISORDER WITH DEPRESSED MOOD: ICD-10-CM

## 2024-07-26 DIAGNOSIS — D69.6 THROMBOCYTOPENIA (H): ICD-10-CM

## 2024-07-26 DIAGNOSIS — E21.5 PARATHYROID ABNORMALITY (H): ICD-10-CM

## 2024-07-26 DIAGNOSIS — K20.80 FUNGAL ESOPHAGITIS: ICD-10-CM

## 2024-07-26 DIAGNOSIS — B49 FUNGAL ESOPHAGITIS: ICD-10-CM

## 2024-07-26 DIAGNOSIS — K21.9 GASTROESOPHAGEAL REFLUX DISEASE, UNSPECIFIED WHETHER ESOPHAGITIS PRESENT: ICD-10-CM

## 2024-07-26 PROCEDURE — 99442 PR PHYSICIAN TELEPHONE EVALUATION 11-20 MIN: CPT | Mod: 93 | Performed by: PHYSICIAN ASSISTANT

## 2024-07-26 RX ORDER — ESCITALOPRAM OXALATE 10 MG/1
10 TABLET ORAL DAILY
COMMUNITY
Start: 2024-07-26 | End: 2024-08-02

## 2024-07-26 ASSESSMENT — ANXIETY QUESTIONNAIRES
7. FEELING AFRAID AS IF SOMETHING AWFUL MIGHT HAPPEN: NOT AT ALL
3. WORRYING TOO MUCH ABOUT DIFFERENT THINGS: NOT AT ALL
GAD7 TOTAL SCORE: 0
2. NOT BEING ABLE TO STOP OR CONTROL WORRYING: NOT AT ALL
IF YOU CHECKED OFF ANY PROBLEMS ON THIS QUESTIONNAIRE, HOW DIFFICULT HAVE THESE PROBLEMS MADE IT FOR YOU TO DO YOUR WORK, TAKE CARE OF THINGS AT HOME, OR GET ALONG WITH OTHER PEOPLE: NOT DIFFICULT AT ALL
6. BECOMING EASILY ANNOYED OR IRRITABLE: NOT AT ALL
GAD7 TOTAL SCORE: 0
8. IF YOU CHECKED OFF ANY PROBLEMS, HOW DIFFICULT HAVE THESE MADE IT FOR YOU TO DO YOUR WORK, TAKE CARE OF THINGS AT HOME, OR GET ALONG WITH OTHER PEOPLE?: NOT DIFFICULT AT ALL
7. FEELING AFRAID AS IF SOMETHING AWFUL MIGHT HAPPEN: NOT AT ALL
1. FEELING NERVOUS, ANXIOUS, OR ON EDGE: NOT AT ALL
5. BEING SO RESTLESS THAT IT IS HARD TO SIT STILL: NOT AT ALL
4. TROUBLE RELAXING: NOT AT ALL

## 2024-07-26 ASSESSMENT — ENCOUNTER SYMPTOMS: NERVOUS/ANXIOUS: 1

## 2024-07-26 NOTE — PROGRESS NOTES
Lissa is a 73 year old who is being evaluated via a billable telephone visit.    What phone number would you like to be contacted at? [phone]  How would you like to obtain your AVS? Ubaldo  Originating Location (pt. Location): Home    Distant Location (provider location):  On-site    Assessment & Plan     Gastroesophageal reflux disease, unspecified whether esophagitis present  Fungal esophagitis  Patient wonders if some of her overall chest discomfort might be her fungal esophagitis coming back.  Agreed to order an upper GI endoscopy.  Encouraged her to look into seeing the same provider that did her last 1.  - UPPER GI ENDOSCOPY; Future    Coronary artery disease involving native coronary artery of native heart without angina pectoris  Hypertension goal BP (blood pressure) < 140/90  Variable control right now.  She has not been taking her medications exactly as prescribed.  She stopped taking her Imdur during a vacation and had problems with hypertension.  She is back on the carvedilol on a regular basis but notices that her blood pressure drops very low when she takes the Imdur.  Advised that she continue to take her carvedilol but decrease her Imdur to half a tablet at nighttime before going to bed.  Would defer to cardiology for any further recommendations.  They may certainly tell her otherwise related to her medications.    Adjustment disorder with depressed mood  Advised that she take a full tablet and try to stay away from benzodiazepine use.  - escitalopram (LEXAPRO) 10 MG tablet; Take 1 tablet (10 mg) by mouth daily    ETOH abuse  Still problematic for which she was using alprazolam at nighttime to help her not drink alcohol.  Advised that this is not a practice that I can support for very long because it is just replacing 1 dependence for another.  Advise increase dose of Lexapro and following up in 6 to 8 weeks.    Unspecified severe protein-calorie malnutrition (H24)  - UPPER GI ENDOSCOPY;  Future    Parathyroid abnormality (H24)  Thrombocytopenia (H24)  No new concerns with respect to these.  Follow-up in 4 to 6 weeks for repeat of labs.      Subjective   Lissa is a 73 year old, presenting for the following health issues:  Referral and Anxiety      7/26/2024     1:19 PM   Additional Questions   Roomed by sarah   Accompanied by self     Anxiety    History of Present Illness       Mental Health Follow-up:  Patient presents to follow-up on Anxiety.    Patient's anxiety since last visit has been:  Good  The patient is not having other symptoms associated with anxiety.  Any significant life events: No  Patient is not feeling anxious or having panic attacks.  Patient has no concerns about alcohol or drug use.          2/25/2022     1:09 PM 7/6/2023     9:13 AM 7/26/2024     1:19 PM   AUSTIN-7 SCORE   Total Score  16 (severe anxiety) 0 (minimal anxiety)   Total Score 14 16 0        Pt says she wants a referral to get her fungal infection checked again       Review of Systems  Constitutional, HEENT, cardiovascular, pulmonary, GI, , musculoskeletal, neuro, skin, endocrine and psych systems are negative, except as otherwise noted.      Objective           Vitals:  No vitals were obtained today due to virtual visit.    Physical Exam   General: Alert and no distress //Respiratory: No audible wheeze, cough, or shortness of breath // Psychiatric:  Appropriate affect, tone, and pace of words          Phone call duration: 11 minutes  Signed Electronically by: Phani Albright PA-C

## 2024-07-26 NOTE — TELEPHONE ENCOUNTER
Anais Payne PA-C Hiljus, Audrey G, RN  Cc: [facility] Heart Team 2  Caller: Unspecified (Today,  8:03 AM)    Recommend patient take Coreg and Imdur as prescribed as PCP note today noted patient was not taking medication as prescribed when she was on vacation.    PCP decreased imdur dose today to half a tablet. Can we confirm doses of carvedilol and imdur patient is taking?    Then have her check her blood pressure once a day for the next week, one hour after taking her blood pressure medication to get an idea of what her blood pressure average is before we make any further medication adjustments. Her blood pressure readings at cardiac rehab have been within normal limits.    Update the clinic if blood pressure is >140 systolic.    Thanks!      Spoke to patient, says the notes are not correct. She was taking the carvedilol 3.125mg BID consistently even while she was on her trip. She has, however, been taking the full tablet of imdur and this has been giving her severe headaches. She will decrease it to the 1/2 tablet (15mg) as instructed, and move the dose to bedtime as recommended at PCP visit to see if this helps with the headaches. She will monitor her BP as outlined above and will call the [facility] in 1wk with an update. .

## 2024-07-26 NOTE — TELEPHONE ENCOUNTER
"Patient spoke to Triage regarding high blood pressure readings.  Patient is back from vacation and states during her vacation her blood pressures have been elevated. She reports a reading was 170/101 at one time. She would take her medications and her readings would go down to 160-170/80-90. She has had headaches all during vacation. This morning her blood pressure at 7am was 163/90. She says she took Imdur at 7am. She has not yet taken coreg as she needs to take with food. Patient says she has chronic chest spasms and reports it is mild. She also had a headache and took two Tylenol an hour ago which helped. No vision changes, SOB, fatigue/weakness. Patient says she will be seeing her PCP today.   Advised this will be routed to the nurses with Anais Payne to further review and follow-up. She verbalized understanding.     1. BLOOD PRESSURE: \"What is the blood pressure?\" \"Did you take at least two measurements 5 minutes apart?\" 163/90 at 7am.  2. ONSET: \"When did you take your blood pressure?\" 7am.  3. HOW: \"How did you take your blood pressure?\" (e.g., automatic home BP monitor, visiting nurse) Home BP monitor.   4. HISTORY: \"Do you have a history of high blood pressure?\" Yes.  5. MEDICINES: \"Are you taking any medicines for blood pressure?\" \"Have you missed any doses recently?\" Takes coreg. Has not taken yet today since she has to take with food.  6. OTHER SYMPTOMS: \"Do you have any symptoms?\" (e.g., blurred vision, chest pain, difficulty breathing, headache, weakness) Chronic chest spasms. Headache took Tylenol which helped.  7. PREGNANCY: \"Is there any chance you are pregnant?\" \"When was your last menstrual period?\"        "

## 2024-07-29 ENCOUNTER — HOSPITAL ENCOUNTER (OUTPATIENT)
Dept: CARDIAC REHAB | Facility: CLINIC | Age: 74
Discharge: HOME | End: 2024-07-29
Attending: INTERNAL MEDICINE
Payer: MEDICARE

## 2024-07-29 PROCEDURE — 93798 PHYS/QHP OP CAR RHAB W/ECG: CPT

## 2024-07-31 ENCOUNTER — HOSPITAL ENCOUNTER (OUTPATIENT)
Dept: CARDIAC REHAB | Facility: CLINIC | Age: 74
Discharge: HOME | End: 2024-07-31
Attending: INTERNAL MEDICINE
Payer: MEDICARE

## 2024-07-31 PROCEDURE — 93798 PHYS/QHP OP CAR RHAB W/ECG: CPT

## 2024-08-02 ENCOUNTER — TELEPHONE (OUTPATIENT)
Dept: CARDIOLOGY | Facility: CLINIC | Age: 74
End: 2024-08-02
Payer: COMMERCIAL

## 2024-08-02 ENCOUNTER — MYC MEDICAL ADVICE (OUTPATIENT)
Dept: FAMILY MEDICINE | Facility: OTHER | Age: 74
End: 2024-08-02
Payer: COMMERCIAL

## 2024-08-02 DIAGNOSIS — F43.21 ADJUSTMENT DISORDER WITH DEPRESSED MOOD: ICD-10-CM

## 2024-08-02 NOTE — TELEPHONE ENCOUNTER
Return call to patient. Medications reviewed with patient and found patient is taking Imdur 15 mg at bedtime. Anais Payne had stopped Imdur 15 mg daily at last Cardiology visit on 7/9/24, however mediation was resumed at PCP visit on 7/26/24 after hypertension BP >140/90 reported.     Patient reports tightening/ chest discomfort since prior to stents placed on 5/29/24. Reports this has worsened since stopping and now recently resuming Imdur 15 mg daily at bedtime. Reports she isn't using nitroglycerin. Educated on indication, use and storage of medication. Instructed to take it at time of call.  Reports BP has been stable since resuming Imdur 15 mg daily - running 120's/60-70's. /67 this AM. Instructed it is okay to take medication with this BP, but to hold and check if SBP <100.    Patient was to see CORI at end of August for a 2 week follow up but canceled due to a trip. No scheduled until October.    Routing messages to Anaisjoaquin Payne for review and plan to call back to assess status of chest pain.      Per PCP Phani Albright on 7/26/24:  Coronary artery disease involving native coronary artery of native heart without angina pectoris  Hypertension goal BP (blood pressure) < 140/90  Variable control right now.  She has not been taking her medications exactly as prescribed.  She stopped taking her Imdur during a vacation and had problems with hypertension.  She is back on the carvedilol on a regular basis but notices that her blood pressure drops very low when she takes the Imdur.  Advised that she continue to take her carvedilol but decrease her Imdur to half a tablet at nighttime before going to bed.  Would defer to cardiology for any further recommendations.  They may certainly tell her otherwise related to her medications.

## 2024-08-02 NOTE — TELEPHONE ENCOUNTER
Anais Payne PA-C  You; [facility] Heart Team 250 minutes ago (12:28 PM)     Now that she has been taking the imdur has it helped decrease her chest pain? Or has the chest pain continued to persist?    If patient is able to be seen sooner that would be preferred.      You  Anais Payne PA-C; [facility] Heart Team 254 minutes ago (12:23 PM)     She resumed taking it after PCP apt on 7/26/24. Will have her continue. Okay with follow up in October? Or sooner? Thanks!      Anais Payne PA-C  [facility] Heart Team 256 minutes ago (12:21 PM)     Did patient discontinue using her imdur after her PCP encouraged her to restart it?    If she has discontinued it, her blood pressures appear stable and she should continue to use it consistently and see if this helps with her chest discomfort.    Thanks!   ------------------------------------    Patient called to discuss chest pain after taking nitroglycerin this morning. Patient reports taking one tablet and chest pain was reduced, then completely went away after 2 doses.  Patient reports no further episodes. Patient educated again on indication, use and storage of medication. Patient reports the imdur has helped her chest discomfort. Instructed per above recommendation from Anais Payne to cotinine to take imdur. Instructed to monitor BP and notify clinic if SBP <90 per LOV instructions.     Patient instructed we had recommend seeing her back in July, and would like her to be soon for an [facility]I apt earler than October is one is available. Patient instructed to call central scheduling to schedule and message sent to scheduling.    Message routed to Anais Payne for review.

## 2024-08-02 NOTE — TELEPHONE ENCOUNTER
[facility] Call Center    Phone Message    May a detailed message be left on voicemail: yes     Reason for Call: Other: Please have someone from Dr. Aguirre team call pt back this morning.  Her BP is 116/67 and she doesn't know if she should take her BP meds or not.  Thank you     Action Taken: Message routed to:  [facility] cardio    Travel Screening: Not Applicable    Thank you!  [facility]       Date of Service:

## 2024-08-05 ENCOUNTER — VIRTUAL VISIT (OUTPATIENT)
Dept: ONCOLOGY | Facility: CLINIC | Age: 74
End: 2024-08-05
Attending: INTERNAL MEDICINE
Payer: COMMERCIAL

## 2024-08-05 VITALS
DIASTOLIC BLOOD PRESSURE: 62 MMHG | BODY MASS INDEX: 23.82 KG/M2 | WEIGHT: 143 LBS | HEIGHT: 65 IN | SYSTOLIC BLOOD PRESSURE: 130 MMHG

## 2024-08-05 DIAGNOSIS — G03.9 MENINGITIS: ICD-10-CM

## 2024-08-05 DIAGNOSIS — T45.1X5A CHEMOTHERAPY-INDUCED NEUROPATHY (H): ICD-10-CM

## 2024-08-05 DIAGNOSIS — C67.9 UROTHELIAL CARCINOMA OF BLADDER WITH INVASION OF MUSCLE (H): Primary | ICD-10-CM

## 2024-08-05 DIAGNOSIS — R53.0 NEOPLASTIC (MALIGNANT) RELATED FATIGUE: ICD-10-CM

## 2024-08-05 DIAGNOSIS — K52.9 COLITIS: ICD-10-CM

## 2024-08-05 DIAGNOSIS — G62.0 CHEMOTHERAPY-INDUCED NEUROPATHY (H): ICD-10-CM

## 2024-08-05 DIAGNOSIS — N18.31 CKD STAGE 3A, GFR 45-59 ML/MIN (H): ICD-10-CM

## 2024-08-05 PROCEDURE — 99214 OFFICE O/P EST MOD 30 MIN: CPT | Mod: 95 | Performed by: INTERNAL MEDICINE

## 2024-08-05 RX ORDER — ESCITALOPRAM OXALATE 10 MG/1
10 TABLET ORAL DAILY
Qty: 30 TABLET | Refills: 0 | Status: SHIPPED | OUTPATIENT
Start: 2024-08-05

## 2024-08-05 ASSESSMENT — PAIN SCALES - GENERAL: PAINLEVEL: NO PAIN (0)

## 2024-08-05 NOTE — PROGRESS NOTES
"Virtual Visit Details    Type of service:  Video Visit   Video Start Time: {video visit start/end time for provider to select:804793}  Video End Time:{video visit start/end time for provider to select:968624}    Originating Location (pt. Location): {video visit patient location:768991::\"Home\"}  {PROVIDER LOCATION On-site should be selected for visits conducted from your clinic location or adjoining [facility] hospital, academic office, or other nearby [facility] building. Off-site should be selected for all other provider locations, including home:030236}  Distant Location (provider location):  {virtual location provider:742325}  Platform used for Video Visit: {Virtual Visit Platforms:899072::\"Organically Maid\"}  "

## 2024-08-05 NOTE — TELEPHONE ENCOUNTER
Patient has scheduled Cardiology [facility]I visit for September 2024. Will send staff message to team 2 RN's to follow up with patient regarding chest pain.

## 2024-08-05 NOTE — TELEPHONE ENCOUNTER
Primary care provider is out of the office, short term refill while they are out.  Patient is also due for reappointment

## 2024-08-05 NOTE — NURSING NOTE
Is the patient currently in the state of [place]? YES    Visit mode:VIDEO    If the visit is dropped, the patient can be reconnected by: VIDEO VISIT: Text to cell phone:   Telephone Information:   Mobile [phone]       Will anyone else be joining the visit? NO  (If patient encounters technical issues they should call [phone])    How would you like to obtain your AVS? MyChart    Are changes needed to the allergy or medication list? No  Rooming Documentation:  Questionnaire(s) not pre-assigned      Reason for visit: Video Visit (Follow up)    Tish OATES

## 2024-08-05 NOTE — Clinical Note
2024      Michaela Dorman  [address]  [facility] [address]      Dear Colleague,    Thank you for referring your patient, Michaela Dorman, to the [facility] [place]. Please see a copy of my visit note below.    [facility] Hematology / Oncology  Progress Note  Name: Michaela Dorman  :  1950    MRN:  [id number]    --------------------    Assessment / Plan:  Left-sided ER-, HER2+ breast CA:  # Sep 2016 Presented w/ left axillary mass; staging multicentric T4 lesion w/ geraldine involvement; biopsy w/ ER-, HER2+ breast cancer  # Sep 2016 - 2017 Neoadjuvant AC x 4 cycles/TH x 4 cycles.  # 2017 Bilateral mastectomy no with geraldine evaluation; complete path CR from invasive cancer; 7 mm DCIS residual.  # 2017 - May 2017 Adjuvant radiation to left chestwall / axilla.  # 2017 - Dec 2017 Adjuvant Herceptin.    Bladder cancer:([id number])  # Oct 2020 Presented w/ dysuria.  # 2021 Cytoscopy w/ muscle-invasive high grade urothelial cancer.  # Mar 2021 TURBT.  # 2021 Neoadjuvant cisplatin/gem split dose complicated by TAMIR.  # May 2021 Neoadjuvant carboplatin/gem.  # 2021 Radical cystectomy w/ ileal conduit; path high grade urothelial carcinoma muscle invasive; margins negs; nodes negative.  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    Continue observation; clinically and radiographically DAGOBERTO.  At continued risk for immunotoxicities given history of delayed presentations; steroid-responsive.  Continue gabapentin 600 mg TID for neuropathy, wean as able.  Counts w/ stable mild anemia (multifactorial).  Chemistries w/ stable CKD3a  RTC 6 months w/ labs (CBC, CMP, TSH) and surveillance CT CAP.    Girish Sauer MD    --------------------    Interval  History:  Michaela returns for follow-up of bladder cancer and immunotoxicities.  She remains well.  Abdominal symptoms have resolved on prednisone w/o recurrence.  No headaches or neuro complaints.  Neuropathy well controlled.  No new symptoms.    Social History:  Social History     Tobacco Use    Smoking status: Former     Current packs/day: 0.00     Average packs/day: 3.0 packs/day for 10.0 years (30.0 ttl pk-yrs)     Types: Cigarettes     Start date: 1969     Quit date: 1979     Years since quittin.7     Passive exposure: Never    Smokeless tobacco: Never    Tobacco comments:     approx. 3 packs daily   Substance Use Topics    Alcohol use: Not Currently     Comment: Stopped drinking a few weeks ago (2024)     Family History:  Family History   Problem Relation Age of Onset    Hypertension Mother     Thyroid Disease Mother     Cerebrovascular Disease Mother     Hypertension Father     Cerebrovascular Disease Father     Cancer Father         skin    Thyroid Disease Sister     Diabetes Brother         type 2    Depression Sister     Breast Cancer Sister         age 47    Cancer Sister         skin    Cancer Brother         inside nose     Immunizations:  Immunization History   Administered Date(s) Administered    COVID-19 Monovalent 18+ (Moderna) 2021, 2021, 2021    Hepatitis B, Adult 1995    Influenza (High Dose) 3 valent vaccine 10/28/2014, 2020    Influenza (IIV3) PF 10/31/2005    Influenza Vaccine 65+ (Fluzone HD) 10/01/2020, 2021    Mantoux Tuberculin Skin Test 11/10/2006    Pneumo Conj 13-V (2010&after) 2016, 2018    Pneumococcal 20 valent Conjugate (Prevnar 20) 2024    TD,PF 7+ (Tenivac) 1990, 2005    TDAP (Adacel,Boostrix) 2022    TDAP Vaccine (Adacel) 2012    Tetanus 2005     Health Maintenance Due   Topic Date Due    ZOSTER IMMUNIZATION (1 of 2) Never done    HEPATITIS A IMMUNIZATION (1 of 2 - Risk 2-dose  series) Never done    Pneumococcal Vaccine: 65+ Years (2 - PPSV23 or PCV20) 07/31/2018    INFLUENZA VACCINE (1) 09/01/2023    COVID-19 Vaccine (4 - 2023-24 season) 09/01/2023     --------------------    Physical Exam:  Video visit.    Labs / Imaging:  Reviewed CBC, CMP.  Reviewed CT CAP w/ independent review.    Video Visit:  Michaela is a 73 year old female who is being evaluated via a billable video visit.  }    Video start time:  2:36 PM  Video end time:  2:51 PM  [facility]  Patient location: Home  Mode of transmission:  Portal / Beyond the Box.        Again, thank you for allowing me to participate in the care of your patient.        Sincerely,        Girish Sauer MD

## 2024-08-06 DIAGNOSIS — R11.0 NAUSEA: Primary | ICD-10-CM

## 2024-08-06 NOTE — PROGRESS NOTES
[facility] Hematology / Oncology  Progress Note  Name: Michaela Dorman  :  1950    MRN:  [id number]    --------------------    Assessment / Plan:  Left-sided ER-, HER2+ breast CA:  # Sep 2016 Presented w/ left axillary mass; staging multicentric T4 lesion w/ geraldine involvement; biopsy w/ ER-, HER2+ breast cancer  # Sep 2016 - 2017 Neoadjuvant AC x 4 cycles/TH x 4 cycles.  # 2017 Bilateral mastectomy no with geraldine evaluation; complete path CR from invasive cancer; 7 mm DCIS residual.  # 2017 - May 2017 Adjuvant radiation to left chestwall / axilla.  # 2017 - Dec 2017 Adjuvant Herceptin.    Bladder cancer:([id number])  # Oct 2020 Presented w/ dysuria.  # 2021 Cytoscopy w/ muscle-invasive high grade urothelial cancer.  # Mar 2021 TURBT.  # 2021 Neoadjuvant cisplatin/gem split dose complicated by TAMIR.  # May 2021 Neoadjuvant carboplatin/gem.  # 2021 Radical cystectomy w/ ileal conduit; path high grade urothelial carcinoma muscle invasive; margins negs; nodes negative.  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    Continue observation; clinically and radiographically DAGOBERTO.  At continued risk for immunotoxicities given history of delayed presentations; steroid-responsive.  Continue gabapentin 600 mg TID for neuropathy, wean as able.  Counts w/ stable mild anemia (multifactorial).  Chemistries w/ stable CKD3a  RTC 6 months w/ labs (CBC, CMP, TSH) and surveillance CT CAP.    Girish Sauer MD    --------------------    Interval History:  Michaela returns for follow-up of bladder cancer and immunotoxicities.  She remains well.  Abdominal symptoms have resolved on prednisone w/o recurrence.  No headaches or neuro complaints.  Neuropathy well controlled.  No new symptoms.    Social  History:  Social History     Tobacco Use    Smoking status: Former     Current packs/day: 0.00     Average packs/day: 3.0 packs/day for 10.0 years (30.0 ttl pk-yrs)     Types: Cigarettes     Start date: 1969     Quit date: 1979     Years since quittin.7     Passive exposure: Never    Smokeless tobacco: Never    Tobacco comments:     approx. 3 packs daily   Substance Use Topics    Alcohol use: Not Currently     Comment: Stopped drinking a few weeks ago (2024)     Family History:  Family History   Problem Relation Age of Onset    Hypertension Mother     Thyroid Disease Mother     Cerebrovascular Disease Mother     Hypertension Father     Cerebrovascular Disease Father     Cancer Father         skin    Thyroid Disease Sister     Diabetes Brother         type 2    Depression Sister     Breast Cancer Sister         age 47    Cancer Sister         skin    Cancer Brother         inside nose     Immunizations:  Immunization History   Administered Date(s) Administered    COVID-19 Monovalent 18+ (Moderna) 2021, 2021, 2021    Hepatitis B, Adult 1995    Influenza (High Dose) 3 valent vaccine 10/28/2014, 2020    Influenza (IIV3) PF 10/31/2005    Influenza Vaccine 65+ (Fluzone HD) 10/01/2020, 2021    Mantoux Tuberculin Skin Test 11/10/2006    Pneumo Conj 13-V (2010&after) 2016, 2018    Pneumococcal 20 valent Conjugate (Prevnar 20) 2024    TD,PF 7+ (Tenivac) 1990, 2005    TDAP (Adacel,Boostrix) 2022    TDAP Vaccine (Adacel) 2012    Tetanus 2005     Health Maintenance Due   Topic Date Due    ZOSTER IMMUNIZATION (1 of 2) Never done    HEPATITIS A IMMUNIZATION (1 of 2 - Risk 2-dose series) Never done    Pneumococcal Vaccine: 65+ Years (2 - PPSV23 or PCV20) 2018    INFLUENZA VACCINE (1) 2023    COVID-19 Vaccine (4 - - season) 2023     --------------------    Physical Exam:  Video visit.    Labs /  Imaging:  Reviewed CBC, CMP.  Reviewed CT CAP w/ independent review.    Video Visit:  Michaela is a 73 year old female who is being evaluated via a billable video visit.  }    Video start time:  2:36 PM  Video end time:  2:51 PM  Provider location: [facility]  Patient location: Home  Mode of transmission:  Portal / i.TV.

## 2024-08-07 ENCOUNTER — TELEPHONE (OUTPATIENT)
Dept: CARDIOLOGY | Facility: CLINIC | Age: 74
End: 2024-08-07
Payer: COMMERCIAL

## 2024-08-07 DIAGNOSIS — I10 HYPERTENSION GOAL BP (BLOOD PRESSURE) < 140/90: ICD-10-CM

## 2024-08-07 DIAGNOSIS — I25.10 CORONARY ARTERY DISEASE INVOLVING NATIVE CORONARY ARTERY OF NATIVE HEART WITHOUT ANGINA PECTORIS: ICD-10-CM

## 2024-08-07 DIAGNOSIS — R07.9 CHEST PAIN, UNSPECIFIED TYPE: ICD-10-CM

## 2024-08-07 NOTE — TELEPHONE ENCOUNTER
----- Message from Joyce ANGELO sent at 8/5/2024  1:31 PM CDT -----  Regarding: FW: How is chest pain with Imdur? Using nitro PRN?    ----- Message -----  From: Joyce Mcrae RN  Sent: 8/5/2024   1:31 PM CDT  To: [facility] Heart Team 2  Subject: How is chest pain with Imdur? Using nitro PRN    Please call to assess how CP in doing and use of imdur. Patient moved CORI follow up from October to Sept as advised.    -----------------------------------------------------------------------    Patient called to assess how chest pain is with use of Imdur and Nitroglycerin as needed. Patient reports occasional chest pain, less than daily. Reports using nitroglycerin as needed and reports moderate relief, however not using total of 3 doses. Instructed again on indication and use of medication and importance of taking 3 doses max, 5 min apart for chest pain. Patient instructed that if chest pain is still present after 3rd dose, she should seek emergent care. Patient reminded of follow up apt scheduled with Melony Pantoja on 9/25/24.

## 2024-08-07 NOTE — TELEPHONE ENCOUNTER
Attempted to contact patient for an update after resuming imdur 15mg daily, is it helping stabilize her chest pain?     Patient to see CORI Melony Pantoja on9/25/2024.    Left message for patient to call back to Team 2 R.N.s @ [phone] spoke with patient to review effects of adding imdur 15mg at bedtime. Patient states her chest pain is better in that it is not as bad or as often. However, she still had nagging chest discomfort episodes at least 2x/day, rating 3/10 for pain.    Patient states BP is also better. She woke up with chest pain early this AM, /87, after meds and breakfast it came down to 147/82.     Patient notes that while she was out in [place], she had higher BP readings, so took her imdur 15mg BID for 4 days and her BP was better. Reviewed with patient that imdur should only be taken once a day going forward.    Will message CORI Anais Payne to review

## 2024-08-09 RX ORDER — PROCHLORPERAZINE MALEATE 10 MG
TABLET ORAL
Qty: 30 TABLET | Refills: 2 | Status: SHIPPED | OUTPATIENT
Start: 2024-08-09

## 2024-08-09 NOTE — TELEPHONE ENCOUNTER
Anais Payne PA-C Anderson, Barbara E, RN; [facility] Heart Team 25 minutes ago (11:44 AM)     Thanks for the update. I am glad the imdur has helped decrease her chest pain episodes.    Please encourage patient to keep track of her blood pressure readings to review at next follow up appointment. Patient may need tighter blood pressure control, which could also help control chest pain.    Thanks!       Spoke to patient, she expressed concern that no changes are being made to her imdur dosing despite the recurrent chest discomfort. She would like clarification regarding what her expectations should be for the chest pain, and when she should be concerned about her symptoms. She wonders if Anais wants her to be taking NTG SL PRN for when she does have breakthrough chest pain.

## 2024-08-12 DIAGNOSIS — G89.4 CHRONIC PAIN SYNDROME: ICD-10-CM

## 2024-08-12 DIAGNOSIS — M48.061 FORAMINAL STENOSIS OF LUMBAR REGION: ICD-10-CM

## 2024-08-12 DIAGNOSIS — M51.369 DDD (DEGENERATIVE DISC DISEASE), LUMBAR: ICD-10-CM

## 2024-08-12 DIAGNOSIS — M54.16 LUMBAR RADICULOPATHY: ICD-10-CM

## 2024-08-12 RX ORDER — ISOSORBIDE MONONITRATE 30 MG/1
30 TABLET, EXTENDED RELEASE ORAL DAILY
Qty: 90 TABLET | Refills: 3 | Status: SHIPPED | OUTPATIENT
Start: 2024-08-12 | End: 2024-09-04

## 2024-08-12 RX ORDER — OXYCODONE HYDROCHLORIDE 5 MG/1
5 TABLET ORAL EVERY 6 HOURS PRN
Qty: 30 TABLET | Refills: 0 | Status: SHIPPED | OUTPATIENT
Start: 2024-08-12 | End: 2024-09-06

## 2024-08-12 RX ORDER — CARVEDILOL 3.12 MG/1
3.12 TABLET ORAL 2 TIMES DAILY WITH MEALS
Qty: 180 TABLET | Refills: 3 | Status: SHIPPED | OUTPATIENT
Start: 2024-08-12

## 2024-08-12 NOTE — TELEPHONE ENCOUNTER
Patient returned call to clinic. Informed of Anais Payne PA-C's below recommendations. Patient agreeable to plan and new prescription for imdur 30 mg sent to patient's preferred pharmacy. Patient also requested refill on coreg. Patient instructed to check BP atleast dailyh and record results. Reminded of follow up apt with Melony Swanson on 9/25/24. Patient verbalizes understanding.      Anais Payne PA-C Hiljus, Audrey G, RN  Cc: JERO Lyons [facility] Team 2  Caller: Unspecified (5 days ago, 11:58 AM)     The chest pain may not completely resolve as patient has residual coronary artery disease.    Patient can increase imdur to 30 mg daily with the extra blood pressure room and see if that helps.    If not, she can further discuss medication regimen and blood pressure control at [facility].

## 2024-08-12 NOTE — TELEPHONE ENCOUNTER
Anais Payne PA-C Hiljus, Audrey G RN  Cc: JERO Lyons [facility] Heart Team 2  Caller: Unspecified (5 days ago, 11:58 AM)    The chest pain may not completely resolve as patient has residual coronary artery disease.    Patient can increase imdur to 30 mg daily with the extra blood pressure room and see if that helps.    If not, she can further discuss medication regimen and blood pressure control at [facility].       Left a message for patient to call back and discuss.

## 2024-08-14 ENCOUNTER — MYC MEDICAL ADVICE (OUTPATIENT)
Dept: CARDIOLOGY | Facility: CLINIC | Age: 74
End: 2024-08-14
Payer: COMMERCIAL

## 2024-08-15 NOTE — TELEPHONE ENCOUNTER
Reply from Dr. Choi  Hey there - can we confirm the time of day the medications are being taken (AM and PM)?  This is off label but could also potentially spread imdur out to 15 mg twice a day and see if this makes a difference. Some patients do that. If BP still high then alternatives are ACEI or norvasc.    Dr. Choi     Reply to patient:    Inderjit, Ms. Dandy,    Dr. Choi suggests trying taking the imdur twice daily: 15mg in the AM and 15mg in PM. If this does not help, then an alternative medication may be needed.    Please continue to monitor your BP and let us know if this helps.    [facility] Anais Payne will be back next week.     Thank you  Team 2 R.N.s  [phone]

## 2024-08-15 NOTE — TELEPHONE ENCOUNTER
My chart message from patient:  Here r my recent readings with imdue 30mg at night and corig morning and night. Starting Mon.     2nd message:    I sent u a list of my current bp. Not sure attachment went thru. My mornings before corig r ranging peix297/83.. 157/82....145/81. Then after corig 117/58...119/62. But by evening it's 161/83..149/91etc until I take imdur and Corig at night. Still seems high morning and night.         Patient was seen for follow up after ED visit for low BP. Imdur stopped. However, as of 8/2/2024 she was unhappy with elevated BP and angina. Imdur 15mg was restarted by PCP on 7/26/2024.  On 8/12/2024 imdur was returned to 30mg daily due to c/o angina.    Patient to see [facility] Melony Pantoja on 9/25/2204.    My chart update routed to Dr. Choi while [facility] Anais Payne is out of the clinic.

## 2024-08-18 ENCOUNTER — APPOINTMENT (OUTPATIENT)
Dept: CT IMAGING | Facility: CLINIC | Age: 74
End: 2024-08-18
Attending: STUDENT IN AN ORGANIZED HEALTH CARE EDUCATION/TRAINING PROGRAM
Payer: MEDICARE

## 2024-08-18 ENCOUNTER — HOSPITAL ENCOUNTER (OUTPATIENT)
Age: 74
End: 2024-08-18
Attending: INTERNAL MEDICINE | Admitting: INTERNAL MEDICINE
Payer: MEDICARE

## 2024-08-18 ENCOUNTER — HOSPITAL ENCOUNTER (EMERGENCY)
Facility: CLINIC | Age: 74
Discharge: HOME | End: 2024-08-19
Attending: STUDENT IN AN ORGANIZED HEALTH CARE EDUCATION/TRAINING PROGRAM | Admitting: STUDENT IN AN ORGANIZED HEALTH CARE EDUCATION/TRAINING PROGRAM
Payer: MEDICARE

## 2024-08-18 DIAGNOSIS — R79.89 ELEVATED TROPONIN: ICD-10-CM

## 2024-08-18 DIAGNOSIS — R07.9 CHEST PAIN, UNSPECIFIED TYPE: ICD-10-CM

## 2024-08-18 DIAGNOSIS — R55 VASOVAGAL SYNCOPE: ICD-10-CM

## 2024-08-18 LAB
ALBUMIN SERPL BCG-MCNC: 4.3 G/DL (ref 3.5–5.2)
ALP SERPL-CCNC: 52 U/L (ref 40–150)
ALT SERPL W P-5'-P-CCNC: 20 U/L (ref 0–50)
ANION GAP SERPL CALCULATED.3IONS-SCNC: 16 MMOL/L (ref 7–15)
APTT PPP: 26 SECONDS (ref 22–38)
AST SERPL W P-5'-P-CCNC: 26 U/L (ref 0–45)
BASOPHILS # BLD AUTO: 0.1 10E3/UL (ref 0–0.2)
BASOPHILS NFR BLD AUTO: 1 %
BILIRUB SERPL-MCNC: 0.4 MG/DL
BUN SERPL-MCNC: 28.4 MG/DL (ref 8–23)
CALCIUM SERPL-MCNC: 10.1 MG/DL (ref 8.8–10.4)
CHLORIDE SERPL-SCNC: 105 MMOL/L (ref 98–107)
CREAT SERPL-MCNC: 1.15 MG/DL (ref 0.51–0.95)
D DIMER PPP FEU-MCNC: 1.06 UG/ML FEU (ref 0–0.5)
EGFRCR SERPLBLD CKD-EPI 2021: 50 ML/MIN/1.73M2
EOSINOPHIL # BLD AUTO: 0.4 10E3/UL (ref 0–0.7)
EOSINOPHIL NFR BLD AUTO: 5 %
ERYTHROCYTE [DISTWIDTH] IN BLOOD BY AUTOMATED COUNT: 14.1 % (ref 10–15)
GLUCOSE SERPL-MCNC: 84 MG/DL (ref 70–99)
HCO3 SERPL-SCNC: 20 MMOL/L (ref 22–29)
HCT VFR BLD AUTO: 34.5 % (ref 35–47)
HGB BLD-MCNC: 11.3 G/DL (ref 11.7–15.7)
HOLD SPECIMEN: NORMAL
IMM GRANULOCYTES # BLD: 0 10E3/UL
IMM GRANULOCYTES NFR BLD: 0 %
INR PPP: 0.96 (ref 0.85–1.15)
LIPASE SERPL-CCNC: 23 U/L (ref 13–60)
LYMPHOCYTES # BLD AUTO: 2.6 10E3/UL (ref 0.8–5.3)
LYMPHOCYTES NFR BLD AUTO: 33 %
MCH RBC QN AUTO: 33.3 PG (ref 26.5–33)
MCHC RBC AUTO-ENTMCNC: 32.8 G/DL (ref 31.5–36.5)
MCV RBC AUTO: 102 FL (ref 78–100)
MONOCYTES # BLD AUTO: 0.6 10E3/UL (ref 0–1.3)
MONOCYTES NFR BLD AUTO: 8 %
NEUTROPHILS # BLD AUTO: 4.3 10E3/UL (ref 1.6–8.3)
NEUTROPHILS NFR BLD AUTO: 53 %
NRBC # BLD AUTO: 0 10E3/UL
NRBC BLD AUTO-RTO: 0 /100
NT-PROBNP SERPL-MCNC: 203 PG/ML (ref 0–900)
PLATELET # BLD AUTO: 250 10E3/UL (ref 150–450)
POTASSIUM SERPL-SCNC: 4.4 MMOL/L (ref 3.4–5.3)
PROT SERPL-MCNC: 7.4 G/DL (ref 6.4–8.3)
RBC # BLD AUTO: 3.39 10E6/UL (ref 3.8–5.2)
SODIUM SERPL-SCNC: 141 MMOL/L (ref 135–145)
TROPONIN T SERPL HS-MCNC: 33 NG/L
TROPONIN T SERPL HS-MCNC: 38 NG/L
WBC # BLD AUTO: 8 10E3/UL (ref 4–11)

## 2024-08-18 PROCEDURE — 250N000011 HC RX IP 250 OP 636: Performed by: STUDENT IN AN ORGANIZED HEALTH CARE EDUCATION/TRAINING PROGRAM

## 2024-08-18 PROCEDURE — 258N000003 HC RX IP 258 OP 636: Performed by: STUDENT IN AN ORGANIZED HEALTH CARE EDUCATION/TRAINING PROGRAM

## 2024-08-18 PROCEDURE — 96361 HYDRATE IV INFUSION ADD-ON: CPT

## 2024-08-18 PROCEDURE — 250N000013 HC RX MED GY IP 250 OP 250 PS 637: Performed by: STUDENT IN AN ORGANIZED HEALTH CARE EDUCATION/TRAINING PROGRAM

## 2024-08-18 PROCEDURE — 250N000009 HC RX 250: Performed by: STUDENT IN AN ORGANIZED HEALTH CARE EDUCATION/TRAINING PROGRAM

## 2024-08-18 PROCEDURE — 99285 EMERGENCY DEPT VISIT HI MDM: CPT | Mod: 25

## 2024-08-18 PROCEDURE — 93005 ELECTROCARDIOGRAM TRACING: CPT

## 2024-08-18 PROCEDURE — 85610 PROTHROMBIN TIME: CPT | Performed by: STUDENT IN AN ORGANIZED HEALTH CARE EDUCATION/TRAINING PROGRAM

## 2024-08-18 PROCEDURE — 93010 ELECTROCARDIOGRAM REPORT: CPT | Performed by: STUDENT IN AN ORGANIZED HEALTH CARE EDUCATION/TRAINING PROGRAM

## 2024-08-18 PROCEDURE — 85379 FIBRIN DEGRADATION QUANT: CPT | Performed by: STUDENT IN AN ORGANIZED HEALTH CARE EDUCATION/TRAINING PROGRAM

## 2024-08-18 PROCEDURE — 99285 EMERGENCY DEPT VISIT HI MDM: CPT | Performed by: STUDENT IN AN ORGANIZED HEALTH CARE EDUCATION/TRAINING PROGRAM

## 2024-08-18 PROCEDURE — 85025 COMPLETE CBC W/AUTO DIFF WBC: CPT | Performed by: STUDENT IN AN ORGANIZED HEALTH CARE EDUCATION/TRAINING PROGRAM

## 2024-08-18 PROCEDURE — 84484 ASSAY OF TROPONIN QUANT: CPT | Performed by: STUDENT IN AN ORGANIZED HEALTH CARE EDUCATION/TRAINING PROGRAM

## 2024-08-18 PROCEDURE — 36415 COLL VENOUS BLD VENIPUNCTURE: CPT | Performed by: STUDENT IN AN ORGANIZED HEALTH CARE EDUCATION/TRAINING PROGRAM

## 2024-08-18 PROCEDURE — 83880 ASSAY OF NATRIURETIC PEPTIDE: CPT | Performed by: STUDENT IN AN ORGANIZED HEALTH CARE EDUCATION/TRAINING PROGRAM

## 2024-08-18 PROCEDURE — 71275 CT ANGIOGRAPHY CHEST: CPT | Mod: ME

## 2024-08-18 PROCEDURE — 80053 COMPREHEN METABOLIC PANEL: CPT | Performed by: STUDENT IN AN ORGANIZED HEALTH CARE EDUCATION/TRAINING PROGRAM

## 2024-08-18 PROCEDURE — 85730 THROMBOPLASTIN TIME PARTIAL: CPT | Performed by: STUDENT IN AN ORGANIZED HEALTH CARE EDUCATION/TRAINING PROGRAM

## 2024-08-18 PROCEDURE — 83690 ASSAY OF LIPASE: CPT | Performed by: STUDENT IN AN ORGANIZED HEALTH CARE EDUCATION/TRAINING PROGRAM

## 2024-08-18 PROCEDURE — 70496 CT ANGIOGRAPHY HEAD: CPT | Mod: MG

## 2024-08-18 RX ORDER — PANTOPRAZOLE SODIUM 40 MG/1
40 TABLET, DELAYED RELEASE ORAL DAILY
Status: DISCONTINUED | OUTPATIENT
Start: 2024-08-19 | End: 2024-08-19 | Stop reason: HOSPADM

## 2024-08-18 RX ORDER — ESCITALOPRAM OXALATE 10 MG/1
10 TABLET ORAL DAILY
Status: DISCONTINUED | OUTPATIENT
Start: 2024-08-19 | End: 2024-08-19 | Stop reason: HOSPADM

## 2024-08-18 RX ORDER — HEPARIN SODIUM,PORCINE 10 UNIT/ML
5-10 VIAL (ML) INTRAVENOUS
Status: DISCONTINUED | OUTPATIENT
Start: 2024-08-18 | End: 2024-08-19 | Stop reason: HOSPADM

## 2024-08-18 RX ORDER — CLOPIDOGREL BISULFATE 75 MG/1
75 TABLET ORAL DAILY
Status: DISCONTINUED | OUTPATIENT
Start: 2024-08-19 | End: 2024-08-19 | Stop reason: HOSPADM

## 2024-08-18 RX ORDER — CARVEDILOL 3.12 MG/1
3.12 TABLET ORAL 2 TIMES DAILY WITH MEALS
Status: DISCONTINUED | OUTPATIENT
Start: 2024-08-18 | End: 2024-08-19 | Stop reason: HOSPADM

## 2024-08-18 RX ORDER — AMOXICILLIN 250 MG
2 CAPSULE ORAL AT BEDTIME
Status: DISCONTINUED | OUTPATIENT
Start: 2024-08-18 | End: 2024-08-19 | Stop reason: HOSPADM

## 2024-08-18 RX ORDER — OXYCODONE HYDROCHLORIDE 5 MG/1
5 TABLET ORAL EVERY 6 HOURS PRN
Status: DISCONTINUED | OUTPATIENT
Start: 2024-08-18 | End: 2024-08-19 | Stop reason: HOSPADM

## 2024-08-18 RX ORDER — IOPAMIDOL 755 MG/ML
500 INJECTION, SOLUTION INTRAVASCULAR ONCE
Status: COMPLETED | OUTPATIENT
Start: 2024-08-18 | End: 2024-08-18

## 2024-08-18 RX ORDER — EZETIMIBE 10 MG/1
10 TABLET ORAL DAILY
Status: DISCONTINUED | OUTPATIENT
Start: 2024-08-19 | End: 2024-08-19 | Stop reason: HOSPADM

## 2024-08-18 RX ORDER — ACETAMINOPHEN 500 MG
500 TABLET ORAL ONCE
Status: COMPLETED | OUTPATIENT
Start: 2024-08-18 | End: 2024-08-18

## 2024-08-18 RX ORDER — LORAZEPAM 1 MG/1
1 TABLET ORAL ONCE
Status: COMPLETED | OUTPATIENT
Start: 2024-08-18 | End: 2024-08-18

## 2024-08-18 RX ORDER — LEVOTHYROXINE SODIUM 25 UG/1
25 TABLET ORAL DAILY
Status: DISCONTINUED | OUTPATIENT
Start: 2024-08-19 | End: 2024-08-19 | Stop reason: HOSPADM

## 2024-08-18 RX ORDER — LORAZEPAM 0.5 MG/1
0.5 TABLET ORAL EVERY 4 HOURS PRN
Status: DISCONTINUED | OUTPATIENT
Start: 2024-08-18 | End: 2024-08-19 | Stop reason: HOSPADM

## 2024-08-18 RX ORDER — ATORVASTATIN CALCIUM 40 MG/1
80 TABLET, FILM COATED ORAL DAILY
Status: DISCONTINUED | OUTPATIENT
Start: 2024-08-19 | End: 2024-08-19 | Stop reason: HOSPADM

## 2024-08-18 RX ORDER — ASPIRIN 81 MG/1
324 TABLET, CHEWABLE ORAL ONCE
Status: COMPLETED | OUTPATIENT
Start: 2024-08-18 | End: 2024-08-18

## 2024-08-18 RX ORDER — GABAPENTIN 300 MG/1
600 CAPSULE ORAL 3 TIMES DAILY
Status: DISCONTINUED | OUTPATIENT
Start: 2024-08-18 | End: 2024-08-19 | Stop reason: HOSPADM

## 2024-08-18 RX ORDER — NITROGLYCERIN 0.4 MG/1
0.4 TABLET SUBLINGUAL
Status: DISCONTINUED | OUTPATIENT
Start: 2024-08-18 | End: 2024-08-18

## 2024-08-18 RX ADMIN — ASPIRIN 81 MG 243 MG: 81 TABLET ORAL at 11:17

## 2024-08-18 RX ADMIN — ACETAMINOPHEN 1000 MG: 500 TABLET ORAL at 21:41

## 2024-08-18 RX ADMIN — SODIUM CHLORIDE 100 ML: 9 INJECTION, SOLUTION INTRAVENOUS at 13:16

## 2024-08-18 RX ADMIN — LORAZEPAM 1 MG: 1 TABLET ORAL at 17:52

## 2024-08-18 RX ADMIN — GABAPENTIN 600 MG: 300 CAPSULE ORAL at 21:35

## 2024-08-18 RX ADMIN — ISOSORBIDE MONONITRATE 15 MG: 30 TABLET, EXTENDED RELEASE ORAL at 21:35

## 2024-08-18 RX ADMIN — IOPAMIDOL 132 ML: 755 INJECTION, SOLUTION INTRAVENOUS at 13:16

## 2024-08-18 RX ADMIN — CARVEDILOL 3.12 MG: 3.12 TABLET, FILM COATED ORAL at 21:35

## 2024-08-18 RX ADMIN — SODIUM CHLORIDE 1000 ML: 9 INJECTION, SOLUTION INTRAVENOUS at 11:15

## 2024-08-18 RX ADMIN — HEPARIN, PORCINE (PF) 10 UNIT/ML INTRAVENOUS SYRINGE 5 ML: at 15:55

## 2024-08-18 RX ADMIN — SENNOSIDES AND DOCUSATE SODIUM 2 TABLET: 8.6; 5 TABLET ORAL at 21:35

## 2024-08-18 ASSESSMENT — ACTIVITIES OF DAILY LIVING (ADL)
ADLS_ACUITY_SCORE: 37

## 2024-08-18 NOTE — ED TRIAGE NOTES
Pt presents with concerns of chest pain and a syncopal episode.  Pt states that the pain started yesterday, but worse today.  Today she has taken 3 nitroglycerin prior to arrival.  Pt had a syncopal episode prior to arrival.  Pt states that she is feeling off, out of body.      Triage Assessment (Adult)       Row Name 08/18/24 1051          Triage Assessment    Airway WDL WDL        Respiratory WDL    Respiratory WDL WDL        Skin Circulation/Temperature WDL    Skin Circulation/Temperature WDL WDL        Cardiac WDL    Cardiac WDL X;chest pain        Chest Pain Assessment    Chest Pain Location anterior chest, left        Peripheral/Neurovascular WDL    Peripheral Neurovascular WDL WDL        Cognitive/Neuro/Behavioral WDL    Cognitive/Neuro/Behavioral WDL X  syncopal episode     Level of Consciousness alert     Orientation oriented x 4

## 2024-08-18 NOTE — MEDICATION SCRIBE - ADMISSION MEDICATION HISTORY
Medication Scribe Admission Medication History    Admission medication history is complete. The information provided in this note is only as accurate as the sources available at the time of the update.    Information Source(s): Patient via in-person    Pertinent Information:     Changes made to PTA medication list:  Added: None  Deleted: Meclizine 25 mg and Sucralfate 1 gm- no longer taking; does not have any  Changed: Isosorbide 30 mg, take one daily changed to take 1/2 tablet twice a day per patient she was instructed by PCP to take it this way     Allergies reviewed with patient and updates made in EHR: yes    Medication History Completed By: LOGAN SANFORD 8/18/2024 3:44 PM    PTA Med List   Medication Sig Note Last Dose    acetaminophen (TYLENOL) 500 MG tablet Take 1,000 mg by mouth 3 times daily as needed for mild pain (500MG X 2 = 1,000MG) 8/18/2024: Patient reported took 2 tabs in ED, own supply  8/18/2024 at 1530    aspirin (ASA) 81 MG chewable tablet Take 1 tablet (81 mg) by mouth daily  8/18/2024 at am    atorvastatin (LIPITOR) 80 MG tablet Take 80 mg by mouth daily  8/18/2024 at am    carvedilol (COREG) 3.125 MG tablet Take 1 tablet (3.125 mg) by mouth 2 times daily (with meals)  8/18/2024 at am    clopidogrel (PLAVIX) 75 MG tablet Take 1 tablet (75 mg) by mouth daily  8/18/2024 at am    Cyanocobalamin (B-12 PO) Take 1 tablet by mouth daily  8/18/2024 at am    escitalopram (LEXAPRO) 10 MG tablet Take 1 tablet (10 mg) by mouth daily  8/18/2024 at am    ezetimibe (ZETIA) 10 MG tablet Take 1 tablet (10 mg) by mouth daily  8/18/2024 at am    gabapentin (NEURONTIN) 300 MG capsule Take 2 capsules (600 mg) by mouth 3 times daily  8/18/2024 at am    isosorbide mononitrate (IMDUR) 30 MG 24 hr tablet Take 1 tablet (30 mg) by mouth daily (Patient taking differently: Take 15 mg by mouth 2 times daily Take 1/2 tablet (15 mg) twice daily)  8/18/2024 at am    levothyroxine (SYNTHROID/LEVOTHROID) 25 MCG tablet Take 1  tablet (25 mcg) by mouth daily  8/18/2024 at am    LORazepam (ATIVAN) 0.5 MG tablet TAKE 1 TABLET (0.5 MG) BY MOUTH EVERY 4 HOURS AS NEEDED FOR ANXIETY, NAUSEA OR SLEEP  Past Week at [facility]    nitroGLYcerin (NITROSTAT) 0.4 MG sublingual tablet For chest pain place 1 tablet under the tongue every 5 minutes for 3 doses. If symptoms persist 5 minutes after 2nd dose call 911. 8/18/2024: Took 3 doses total 8/18/2024; has remaining on hand at home  8/18/2024 at am    oxyCODONE (ROXICODONE) 5 MG tablet TAKE 1 TABLET (5 MG) BY MOUTH EVERY 6 HOURS AS NEEDED FOR MODERATE TO SEVERE PAIN  8/18/2024 at am    pantoprazole (PROTONIX) 40 MG EC tablet Take 1 tablet (40 mg) by mouth daily  8/18/2024 at am    prochlorperazine (COMPAZINE) 10 MG tablet TAKE 1/2 TABLET BY MOUTH EVERY 6 HOURS AS NEEDED FORNAUSEA/VOMITING (Patient taking differently: Take 5 mg by mouth every 6 hours as needed for nausea or vomiting Take 1/2 tablet (5 mg) every 6 hours as needed for nausea or vomiting)  8/18/2024 at am    senna-docusate (SENEXON-S) 8.6-50 MG tablet Take 2 tablets by mouth at bedtime  8/17/2024 at hs

## 2024-08-18 NOTE — ED PROVIDER NOTES
History     Chief Complaint   Patient presents with    Chest Pain     HPI  Michaela Dorman is a 73 year old female with history of hypertension, CKD, breast and bladder cancer, carotid stenosis s/p endarterectomy, coronary disease with stent who presents for evaluation of chest pain, lightheadedness, and a syncopal episode.  Patient developed chest pressure last night which continued upon waking up this morning.  Discomfort has improved but persisted despite taking 3 doses of nitroglycerin at home.  Patient describes having intermittent chest pain ever since she received a stent back in May.  She also describes having poorly controlled hypertension with systolic values usually around 160 and diastolic values around 90.  Her PCP and cardiologist have been gradually increasing Imdur and Coreg, most recently 3 days ago.  Then while the patient was at [employer] this morning, she developed diaphoresis, tunnel vision, worsened chest pain, and subsequently had a 32nd syncopal episode.  No fall or injury.  No seizure-like activity.  Upon waking up, pain persisted so she came to the emergency department for further evaluation.  Patient denies any recent illness, significant shortness of breath or cough, focal numbness/tingling/weakness, new pain or swelling of the legs, other complaints today.    Per chart review, echocardiogram on 6/12 showed borderline concentric left ventricular hypertrophy with normal systolic function and wall motion.  Catheterization on 6/12 showed mid LAD lesion that is 50% stenosed, proximal circumflex lesion with 60% stenosis.    Allergies:  Allergies   Allergen Reactions    Oxybutynin      Patient reports symptoms of nausea and mind fogginess     Problem List:    Patient Active Problem List    Diagnosis Date Noted    Fungal esophagitis 07/26/2024     Priority: Medium    Gastroesophageal reflux disease, unspecified whether esophagitis present 07/26/2024     Priority: Medium    ETOH abuse 07/26/2024      Priority: Medium    Coronary artery disease involving native coronary artery of native heart without angina pectoris 07/09/2024     Priority: Medium    Unstable angina (H) 06/12/2024     Priority: Medium    Carotid artery disease without cerebral infarction (H24) 04/25/2024     Priority: Medium    Imbalance 02/08/2024     Priority: Medium    Chemotherapy-induced neuropathy (H24) 01/03/2024     Priority: Medium    Unspecified severe protein-calorie malnutrition (H24) 01/03/2024     Priority: Medium    Chronic fatigue syndrome 07/07/2023     Priority: Medium    Vision changes 07/07/2023     Priority: Medium    Balance problems 07/07/2023     Priority: Medium    Skin sensation disturbance 07/07/2023     Priority: Medium    Ileostomy status (H) 07/07/2023     Priority: Medium    Acquired hypothyroidism 07/07/2023     Priority: Medium    Hypothyroidism due to medication 07/07/2023     Priority: Medium    Pulmonary nodules 07/07/2023     Priority: Medium    F11.2 - Continuous opioid dependence (H) 07/07/2023     Priority: Medium    Status post ileal conduit (H) 09/12/2022     Priority: Medium    Status of artificial opening of urinary tract (H) 08/19/2022     Priority: Medium    Rheumatoid arthritis of multiple sites with negative rheumatoid factor (H) 02/25/2022     Priority: Medium    Parathyroid abnormality (H24) 02/25/2022     Priority: Medium    Hypomagnesemia 11/18/2021     Priority: Medium    Need for prophylactic vaccination and inoculation against influenza 11/18/2021     Priority: Medium    Constipation, unspecified constipation type 09/28/2021     Priority: Medium    Imaging abnormalities 09/28/2021     Priority: Medium    Anemia in neoplastic disease 05/21/2021     Priority: Medium    Chemotherapy-induced neutropenia (H24) 05/19/2021     Priority: Medium    Thrombocytopenia (H24) 05/19/2021     Priority: Medium    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) 04/07/2021     Priority: Medium      Check 11.21 for f/u Marty  Added automatically from request for surgery [id number]      Malignant neoplasm of overlapping sites of bladder (H) 03/08/2021     Priority: Medium    Personal history of malignant neoplasm of bladder 03/04/2021     Priority: Medium     Added automatically from request for surgery [id number]      Benign paroxysmal positional vertigo, bilateral 12/24/2020     Priority: Medium    Personal history of malignant neoplasm of breast 10/01/2020     Priority: Medium    Acute cystitis with hematuria 10/01/2020     Priority: Medium    Symptomatic stenosis of left carotid artery 09/22/2020     Priority: Medium     Added automatically from request for surgery [id number]      POSSIBLE Right internal carotid artery aneurysm 09/08/2020     Priority: Medium    Carotid stenosis, bilateral L80%, R40% 09/02/2020     Priority: Medium    Carotid artery plaque, bilateral 09/02/2020     Priority: Medium    Anemia 07/16/2020     Priority: Medium    S/P lumbar fusion 02/04/2020     Priority: Medium    Bilateral impacted cerumen 01/13/2020     Priority: Medium    Stage 3a chronic kidney disease (H) 09/09/2019     Priority: Medium    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      Central stenosis of spinal canal 12/01/2017     Priority: Medium     lumbar        DDD (degenerative disc disease), lumbar 12/01/2017     Priority: Medium    Chronic pain syndrome 11/30/2017     Priority: Medium     substancePatient is followed by Phani Jason PA-C for ongoing prescription of pain medication.  All refills should only be approved by this provider, or covering partner.    Medication(s): Oxycodone IR 5mg.   Maximum quantity per month:  20  Clinic visit frequency required: Q 3 months     Controlled substance agreement:  Encounter-Level CSA:       There are no encounter-level csa.          Pain Clinic evaluation in the past: Yes       Date/Location:      DIRE Total Score(s):  No flowsheet data found.    Last [facility] website verification:  none   [email or web address]            Lumbar radiculopathy 11/30/2017     Priority: Medium    S/P reverse total shoulder arthroplasty, right 06/05/2017     Priority: Medium    Prophylactic antibiotic for dental procedure indicated due to prior joint replacement 06/05/2017     Priority: Medium    Hyperlipidemia LDL goal <70 05/30/2017     Priority: Medium    Anxiety 03/29/2017     Priority: Medium    S/P bilateral mastectomy 02/08/2017     Priority: Medium    Encounter for antineoplastic chemotherapy 10/07/2016     Priority: Medium    Malignant neoplasm of upper-outer quadrant of left breast in female, estrogen receptor negative (H) 10/06/2016     Priority: Medium    Arthritis of shoulder region, right 11/17/2015     Priority: Medium    Adjustment disorder with depressed mood 11/11/2015     Priority: Medium    Baker's cyst of knee 02/09/2015     Priority: Medium    Patella-femoral syndrome 02/09/2015     Priority: Medium    Osteoarthrosis, hip 02/05/2013     Priority: Medium    Tinnitus of right ear 02/05/2013     Priority: Medium    Hypertension goal BP (blood pressure) < 140/90 10/04/2011     Priority: Medium    Trochanteric bursitis 01/06/2009     Priority: Medium    Family history of stroke (cerebrovascular) 03/07/2008     Priority: Medium    Enthesopathy of hip region 01/30/2006     Priority: Medium      Past Medical History:    Past Medical History:   Diagnosis Date    Acute kidney injury (H24)     Carotid stenosis, bilateral L80%, R40% 09/02/2020    Central stenosis of spinal canal 12/01/2017    DDD (degenerative disc disease), lumbar 12/01/2017    Depressive disorder, not elsewhere classified      Esophageal reflux     ETOH abuse     Foraminal stenosis of lumbar region 12/01/2017    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    Subdural hematoma (H)     Thyroid disease     Urothelial carcinoma (H)      Past Surgical History:    Past Surgical History:   Procedure Laterality Date    ARTHROPLASTY SHOULDER Right 11/17/2015    ARTHROSCOPY KNEE  6/7/2012    Procedure:ARTHROSCOPY KNEE; right knee arthroscopy with partial lateral menisectomy; Surgeon:DAMIÁN MCALLISTER; Location:[facility]    BIOPSY VAGINAL N/A 10/27/2021    Procedure: DISTAL URETHRECTOMY;  Surgeon: Leonardo Robles MD;  Location: [facility] OR    COLONOSCOPY N/A 12/22/2017    Procedure: COLONOSCOPY;  colonoscopy;  Surgeon: Chuck Chand MD;  Location: [facility]    CV CORONARY ANGIOGRAM N/A 4/23/2024    Procedure: Coronary Angiogram;  Surgeon: Leonardo Marinelli MD;  Location:  [facility] CARDIAC CATH LAB    CV CORONARY ANGIOGRAM N/A 6/13/2024    Procedure: Coronary Angiogram;  Surgeon: Juliette Stevens MD;  Location:  HEART CARDIAC CATH LAB    CV INTRAVASULAR ULTRASOUND N/A 4/23/2024    Procedure: Intravascular Ultrasound;  Surgeon: Leonardo Marinelli MD;  Location:  HEART CARDIAC CATH LAB    CV LEFT HEART CATH N/A 4/23/2024    Procedure: Left Heart Catheterization;  Surgeon: Leonardo Marinelli MD;  Location:  HEART CARDIAC CATH LAB    CV LEFT HEART CATH N/A 6/13/2024    Procedure: Left Heart Catheterization;  Surgeon: Juliette Stevens MD;  Location:  HEART CARDIAC CATH LAB    CV PCI N/A 6/13/2024    Procedure: Percutaneous Coronary Intervention;  Surgeon: Juliette Stevens MD;  [facility]: [facility] CARDIAC C[facility]    CV PCI STENT DRUG ELUTING N/A 4/23/2024    Procedure: Percutaneous Coronary Intervention Stent;  Surgeon:  Leonardo Marinelli MD;  Location:  HEART CARDIAC CATH LAB    CYSTECTOMY BLADDER RADICAL, ILEAL DIVERSION, COMBINED N/A 6/1/2021    Procedure: RADICAL CYSTECTOMY  WITH ILEAL CONDUIT CREATION,BILATERAL PELVIC LYMPHADENECTOMY;  Surgeon: Leonardo Robles MD;  Location: [facility] OR    CYSTOSCOPY, RETROGRADES, COMBINED Bilateral 3/18/2021    Procedure: CYSTOSCOPY, WITH RETROGRADE PYELOGRAM;  Surgeon: Girish Jack MD;  Location: [facility] OR    CYSTOSCOPY, TRANSURETHRAL RESECTION (TUR) TUMOR BLADDER, COMBINED N/A 3/18/2021    Procedure: CYSTOSCOPY, WITH TRANSURETHRAL RESECTION BLADDER TUMOR;  Surgeon: Girish Jack MD;  Location: [facility] OR    ENDARTERECTOMY CAROTID Left 10/8/2020    Procedure: LEFT CAROTID ENDARTERECTOMY WITH EEG;  Surgeon: Lacho Jasmine MD;  Location:  OR    ESOPHAGOSCOPY, GASTROSCOPY, DUODENOSCOPY (EGD), COMBINED N/A 6/20/2022    Procedure: ESOPHAGOGASTRODUODENOSCOPY, WITH BIOPSY;  Surgeon: Ramses Arenas MD;  Location:  GI    EXCISE MASS AXILLA Left 9/16/2016    Procedure: EXCISE MASS AXILLA;  Surgeon: Keyon Blanco MD;  Location: [facility]    HC REMV CATARACT EXTRACAP,INSERT LENS, W/O ECP  6/25/2009    Right eye    INJECT EPIDURAL LUMBAR N/A 4/11/2019    Procedure: interlaminar epidual steroid injection lumbar 4-5;  Surgeon: Oskar Salvador MD;  Location: [facility]    INJECT EPIDURAL LUMBAR Bilateral 9/12/2019    Procedure: lumbar 4-5 epidural interlaminar steroid injection;  Surgeon: Oskar Salvador MD;  Location: [facility]    INSERT PORT VASCULAR ACCESS Right 10/7/2016    Procedure: INSERT PORT VASCULAR ACCESS;  Surgeon: Keyon Blanco MD;  Location: [facility]    IR CHEST PORT PLACEMENT > 5 YRS OF AGE  4/16/2021    MASTECTOMY SIMPLE BILATERAL Bilateral 2/8/2017    Procedure: MASTECTOMY SIMPLE BILATERAL;  Surgeon: Keyon Blanco MD;  Location: [facility]    OPEN REDUCTION INTERNAL FIXATION FOOT Right 3/20/2015    Procedure: OPEN REDUCTION INTERNAL FIXATION FOOT;  Surgeon: Javi Herrmann,  DPM;  Location: [facility]    OPTICAL TRACKING SYSTEM FUSION SPINE POSTERIOR LUMBAR TWO LEVELS N/A 2020    Procedure: LUMBAR 2-SACRAL1 TRANSFORMINAL INTERBODY FUSION;  Surgeon: Lenny Gomes MD;  Location: [facility] OR    REMOVE PORT VASCULAR ACCESS Right 2018    Procedure: REMOVE PORT VASCULAR ACCESS;  right intra jugular port removal;  Surgeon: Keyon Blanco MD;  Location: [facility]    SHOULDER SURGERY Right 2015    ZZC LIGATE FALLOPIAN TUBE      ZZC NONSPECIFIC PROCEDURE      ankle fracture surgery     Family History:    Family History   Problem Relation Age of Onset    Hypertension Mother     Thyroid Disease Mother     Cerebrovascular Disease Mother     Hypertension Father     Cerebrovascular Disease Father     Cancer Father         skin    Thyroid Disease Sister     Diabetes Brother         type 2    Depression Sister     Breast Cancer Sister         age 47    Cancer Sister         skin    Cancer Brother         inside nose     Social History:  Marital Status:   [2]  Social History     Tobacco Use    Smoking status: Former     Current packs/day: 0.00     Average packs/day: 3.0 packs/day for 10.0 years (30.0 ttl pk-yrs)     Types: Cigarettes     Start date: 1969     Quit date: 1979     Years since quittin.7     Passive exposure: Never    Smokeless tobacco: Never    Tobacco comments:     approx. 3 packs daily   Vaping Use    Vaping status: Never Used   Substance Use Topics    Alcohol use: Not Currently     Comment: Stopped drinking a few weeks ago (2024)    Drug use: No      Medications:    acetaminophen (TYLENOL) 500 MG tablet  aspirin (ASA) 81 MG chewable tablet  atorvastatin (LIPITOR) 80 MG tablet  carvedilol (COREG) 3.125 MG tablet  clopidogrel (PLAVIX) 75 MG tablet  Cyanocobalamin (B-12 PO)  escitalopram (LEXAPRO) 10 MG tablet  ezetimibe (ZETIA) 10 MG tablet  gabapentin (NEURONTIN) 300 MG capsule  isosorbide mononitrate (IMDUR) 30 MG 24 hr tablet  levothyroxine  "(SYNTHROID/LEVOTHROID) 25 MCG tablet  LORazepam (ATIVAN) 0.5 MG tablet  nitroGLYcerin (NITROSTAT) 0.4 MG sublingual tablet  oxyCODONE (ROXICODONE) 5 MG tablet  pantoprazole (PROTONIX) 40 MG EC tablet  prochlorperazine (COMPAZINE) 10 MG tablet  senna-docusate (SENEXON-S) 8.6-50 MG tablet      Review of Systems   All other systems reviewed and are negative.  See HPI.    Physical Exam   BP: (!) 147/85  Pulse: 70  Temp: 98  F (36.7  C)  Resp: 20  Height: 165.1 cm (5' 5\")  Weight: 66.7 kg (147 lb)  SpO2: 98 %    Physical Exam  Vitals and nursing note reviewed.   Constitutional:       General: She is not in acute distress.     Appearance: Normal appearance. She is well-developed. She is not ill-appearing or diaphoretic.      Comments: No acute distress, sitting comfortably on exam bed.   HENT:      Head: Normocephalic and atraumatic.      Mouth/Throat:      Mouth: Mucous membranes are moist.   Eyes:      General: No scleral icterus.     Extraocular Movements: Extraocular movements intact.      Conjunctiva/sclera: Conjunctivae normal.      Pupils: Pupils are equal, round, and reactive to light.   Neck:      Vascular: No JVD.   Cardiovascular:      Rate and Rhythm: Normal rate and regular rhythm.      Pulses:           Radial pulses are 2+ on the right side and 2+ on the left side.        Dorsalis pedis pulses are 2+ on the right side and 2+ on the left side.      Heart sounds: Normal heart sounds. No murmur heard.  Pulmonary:      Effort: No respiratory distress.      Breath sounds: Normal breath sounds. No decreased breath sounds or wheezing.      Comments: Speaking comfortably in full sentences.  Lungs clear to auscultation.  Chest:      Chest wall: No tenderness or crepitus.   Abdominal:      General: Abdomen is flat.      Palpations: Abdomen is soft. There is no mass.      Tenderness: There is no abdominal tenderness.   Musculoskeletal:         General: Normal range of motion.      Cervical back: Normal range of motion " and neck supple.      Right lower leg: No tenderness. No edema.      Left lower leg: No tenderness. No edema.   Skin:     General: Skin is warm.      Capillary Refill: Capillary refill takes less than 2 seconds.      Coloration: Skin is not pale.      Findings: No rash.   Neurological:      General: No focal deficit present.      Mental Status: She is alert and oriented to person, place, and time.      Comments: Cranial nerves intact.  Moving all extremity spontaneously with equal strength throughout.   Psychiatric:         Mood and Affect: Mood is anxious.         ED Course     ED Course as of 08/18/24 2054   Sun Aug 18, 2024   1512 Spoke with Dr. Olvera, cardiology.  Reviewed history and results from today.  Expressed concern about potentially decreasing either of Imdur/Coreg, as this would likely worsen her anginal symptoms.  Recommended admission for the sake of conducting a stress test.  Feels that can be performed locally here in [place].  Will request admission here.   1655 Spoke with the triage hospitalist, Dr. Siddiqui.  Stated that the patient is likely safe for admission locally here in [place], but will still place her on the wait list for transfer to [facility], though due to capacity issues, this will almost certainly mean she will be boarding in the emergency department overnight.     Procedures         EKG performed at 10:52 AM.  Sinus rhythm, rate 65.  Normal axis.  Normal intervals.  LVH.  Negative precordial T waves.  Nonspecific T wave changes.  Exam independently interpreted by me.       Results for orders placed or performed during the hospital encounter of 08/18/24 (from the past 24 hour(s))   [place] Draw    Narrative    The following orders were created for panel order [place] Draw.  Procedure                               Abnormality         Status                     ---------                               -----------         ------                     Extra Blue Top Tube[[id number]]                               Final result               Extra Red Top Tube[[id number]]                               Final result               Extra Green Top (Lithium...[[id number]]                      Final result               Extra Purple Top Tube[[id number]]                            Final result               Extra Heparinized Syringe[[id number]]                        Final result                 Please view results for these tests on the individual orders.   Extra Blue Top Tube   Result Value Ref Range    Hold Specimen JIC    Extra Red Top Tube   Result Value Ref Range    Hold Specimen JIC    Extra Green Top (Lithium Heparin) Tube   Result Value Ref Range    Hold Specimen JIC    Extra Purple Top Tube   Result Value Ref Range    Hold Specimen JIC    Extra Heparinized Syringe   Result Value Ref Range    Hold Specimen JIC    CBC with platelets differential    Narrative    The following orders were created for panel order CBC with platelets differential.  Procedure                               Abnormality         Status                     ---------                               -----------         ------                     CBC with platelets and d...[[id number]]  Abnormal            Final result                 Please view results for these tests on the individual orders.   Troponin T, High Sensitivity   Result Value Ref Range    Troponin T, High Sensitivity 38 (H) <=14 ng/L   INR   Result Value Ref Range    INR 0.96 0.85 - 1.15   Partial thromboplastin time   Result Value Ref Range    aPTT 26 22 - 38 Seconds   Comprehensive metabolic panel   Result Value Ref Range    Sodium 141 135 - 145 mmol/L    Potassium 4.4 3.4 - 5.3 mmol/L    Carbon Dioxide (CO2) 20 (L) 22 - 29 mmol/L    Anion Gap 16 (H) 7 - 15 mmol/L    Urea Nitrogen 28.4 (H) 8.0 - 23.0 mg/dL    Creatinine 1.15 (H) 0.51 - 0.95 mg/dL    GFR Estimate 50 (L) >60 mL/min/1.73m2    Calcium 10.1 8.8 - 10.4 mg/dL    Chloride 105 98 - 107 mmol/L    Glucose 84 70 - 99  mg/dL    Alkaline Phosphatase 52 40 - 150 U/L    AST 26 0 - 45 U/L    ALT 20 0 - 50 U/L    Protein Total 7.4 6.4 - 8.3 g/dL    Albumin 4.3 3.5 - 5.2 g/dL    Bilirubin Total 0.4 <=1.2 mg/dL   Lipase   Result Value Ref Range    Lipase 23 13 - 60 U/L   D dimer quantitative   Result Value Ref Range    D-Dimer Quantitative 1.06 (H) 0.00 - 0.50 ug/mL FEU    Narrative    This D-dimer assay is intended for use in conjunction with a clinical pretest probability assessment model to exclude pulmonary embolism (PE) and deep venous thrombosis (DVT) in outpatients suspected of PE or DVT. The cut-off value is 0.50 ug/mL FEU.    For patients 50 years of age or older, the application of age-adjusted cut-off values for D-Dimer may increase the specificity without significant effect on sensitivity. The literature suggested calculation age adjusted cut-off in ug/L = age in years x 10 ug/L. The results in this laboratory are reported as ug/mL rather than ug/L. The calculation for age adjusted cut off in ug/mL= age in years x 0.01 ug/mL. For example, the cut off for a 76 year old male is 75 x 0.01 ug/mL = 0.76 ug/mL (760 ug/L).    M Rosalina et al. Age adjusted D-dimer cut-off levels to rule out pulmonary embolism: The ADJUST-PE Study. TAL 2014;311:2498-2016.; HJ Lorri et al. Diagnostic accuracy of conventional or age adjusted D-dimer cutoff values in older patients with suspected venous thromboembolism. Systemic review and meta-analysis. BMJ 2013:346:f2492.   Nt probnp inpatient (BNP)   Result Value Ref Range    N terminal Pro BNP Inpatient 203 0 - 900 pg/mL   CBC with platelets and differential   Result Value Ref Range    WBC Count 8.0 4.0 - 11.0 10e3/uL    RBC Count 3.39 (L) 3.80 - 5.20 10e6/uL    Hemoglobin 11.3 (L) 11.7 - 15.7 g/dL    Hematocrit 34.5 (L) 35.0 - 47.0 %     (H) 78 - 100 fL    MCH 33.3 (H) 26.5 - 33.0 pg    MCHC 32.8 31.5 - 36.5 g/dL    RDW 14.1 10.0 - 15.0 %    Platelet Count 250 150 - 450 10e3/uL    %  Neutrophils 53 %    % Lymphocytes 33 %    % Monocytes 8 %    % Eosinophils 5 %    % Basophils 1 %    % Immature Granulocytes 0 %    NRBCs per 100 WBC 0 <1 /100    Absolute Neutrophils 4.3 1.6 - 8.3 10e3/uL    Absolute Lymphocytes 2.6 0.8 - 5.3 10e3/uL    Absolute Monocytes 0.6 0.0 - 1.3 10e3/uL    Absolute Eosinophils 0.4 0.0 - 0.7 10e3/uL    Absolute Basophils 0.1 0.0 - 0.2 10e3/uL    Absolute Immature Granulocytes 0.0 <=0.4 10e3/uL    Absolute NRBCs 0.0 10e3/uL   CTA Head Neck with Contrast    Narrative    EXAM: CTA HEAD NECK W CONTRAST  LOCATION: [facility] [place]  DATE: 8/18/2024    INDICATION: Substernal chest pressure, syncopal episode, known carotid stenosis  COMPARISON: June 14, 2024  CONTRAST: Isovue 370, 67mL  TECHNIQUE: Head and neck CT angiogram with IV contrast. Axial helical CT images of the head and neck vessels obtained during the arterial phase of intravenous contrast administration. Axial 2D reconstructed images and multiplanar 3D MIP reconstructed   images of the head and neck vessels were performed by the technologist. Dose reduction techniques were used. All stenosis measurements made according to NASCET criteria unless otherwise specified.    FINDINGS:   HEAD CTA:  Examination of the anterior circulation demonstrates flow within the bilateral internal carotid and proximal aspects of the anterior middle cerebral arteries. The anterior communicating artery is demonstrated.    Examination of the posterior circulation demonstrates flow within the distal vertebral, basilar, proximal aspects of the posterior cerebral arteries. The right posterior communicating artery is demonstrated. The left posterior communicating artery is not   demonstrated with certainty.    No evidence of pathologic vascular occlusion or saccular aneurysm within the visualized intracranial circulation by CT angiography.    NECK CTA:  The aortic arch has normal branching configuration. There is flow  within the brachiocephalic, common carotid, and proximal aspects of the subclavian arteries.    The right common carotid artery is patent. Examination of the right carotid bulb demonstrates calcified and atherosclerotic plaque without evidence of hemodynamically significant stenosis within the right internal carotid artery within the neck.    The left common carotid artery is patent. Postsurgical changes left carotid endarterectomy. The internal carotid artery is widely patent.    The right and left vertebral arteries are patent.    No evidence of arterial dissection.    The parotid, submandibular and thyroid glands are unremarkable.    The partially imaged lung apices are unremarkable.    No suspicious osseous lesions.      Impression    IMPRESSION:   HEAD CTA:   1.  No evidence of pathologic vascular occlusion or saccular aneurysm within the visualized intracranial circulation by CT angiography.    NECK CTA:  1.  Left carotid endarterectomy. The left internal carotid artery is widely patent.  2.  No evidence of hemodynamically significant stenosis within the right internal carotid artery within the neck.   CT Chest Pulmonary Embolism w Contrast    Narrative    EXAM: CT CHEST PULMONARY EMBOLISM WITH CONTRAST  LOCATION: [facility]  DATE: 08/18/2024    INDICATION: Substernal chest pressure, syncopal episode, known carotid stenosis.  COMPARISON: CT 05/24/2024.  TECHNIQUE: CT chest pulmonary angiogram during arterial phase injection of IV contrast. Multiplanar reformats and MIP reconstructions were performed. Dose reduction techniques were used.   CONTRAST: Isovue 370, 65 mL.    FINDINGS:  ANGIOGRAM CHEST: Pulmonary arteries are normal caliber and negative for pulmonary emboli. Thoracic aorta is negative for dissection. No CT evidence of right heart strain.    LUNGS AND PLEURA: No effusion or acute airspace consolidation. Small areas of bilateral atelectasis. Stable medial right upper  lobe 3 mm nodule series 6 image 112. Other stable nodules noted. No worrisome new airspace disease.    MEDIASTINUM/AXILLAE: Right chest Port-A-Cath. No adenopathy identified. No mediastinal fluid collection.    CORONARY ARTERY CALCIFICATION: Severe.    UPPER ABDOMEN: Contrast within distended bilateral renal collecting systems..    MUSCULOSKELETAL: No acute abnormality or focal suspicious lesion.      Impression    IMPRESSION:  1.  No evidence for pulmonary embolism or acute airspace disease.  2.  Diffuse coronary artery calcifications.       Troponin T, High Sensitivity   Result Value Ref Range    Troponin T, High Sensitivity 33 (H) <=14 ng/L     *Note: Due to a large number of results and/or encounters for the requested time period, some results have not been displayed. A complete set of results can be found in Results Review.     Medications   sodium chloride (PF) 0.9% PF flush 10-20 mL (10 mLs Intracatheter $Given 8/18/24 1465)   heparin lock flush 10 unit/mL injection 5-10 mL (5 mLs Intracatheter $Given 8/18/24 3145)   atorvastatin (LIPITOR) tablet 80 mg (has no administration in time range)   carvedilol (COREG) tablet 3.125 mg (has no administration in time range)   clopidogrel (PLAVIX) tablet 75 mg (has no administration in time range)   escitalopram (LEXAPRO) tablet 10 mg (has no administration in time range)   ezetimibe (ZETIA) tablet 10 mg (has no administration in time range)   gabapentin (NEURONTIN) capsule 600 mg (has no administration in time range)   isosorbide mononitrate (IMDUR) 24 hr half-tab 15 mg (has no administration in time range)   levothyroxine (SYNTHROID/LEVOTHROID) tablet 25 mcg (has no administration in time range)   LORazepam (ATIVAN) tablet 0.5 mg (has no administration in time range)   oxyCODONE (ROXICODONE) tablet 5 mg (has no administration in time range)   pantoprazole (PROTONIX) EC tablet 40 mg (has no administration in time range)   senna-docusate (SENOKOT-S/PERICOLACE) 8.6-50 MG  per tablet 2 tablet (has no administration in time range)   aspirin (ASA) chewable tablet 324 mg (243 mg Oral $Given 8/18/24 1117)   sodium chloride 0.9% BOLUS 1,000 mL (0 mLs Intravenous Stopped 8/18/24 1433)   CT Saline Flush 100mL (100 mLs As instructed $Given 8/18/24 1316)   iopamidol (ISOVUE-370) solution 500 mL (132 mLs Intravenous $Given 8/18/24 1316)   LORazepam (ATIVAN) tablet 1 mg (1 mg Oral $Given 8/18/24 1752)       Assessments & Plan (with Medical Decision Making)     I have reviewed the nursing notes.    I have reviewed the findings, diagnosis, plan and need for follow up with the patient.  Medical Decision Making  Michaela Dorman is a 73 year old female with history of hypertension, CKD, breast and bladder cancer, carotid stenosis s/p endarterectomy, coronary disease with stent who presents for evaluation of chest pain, lightheadedness, and a syncopal episode.  Normal vitals on arrival.  Initial exam was very reassuring.  Neurological exam was nonfocal, lungs clear to auscultation, pulses equal in all extremities, and no leg swelling or tenderness.  It sounds like her syncopal episode earlier today is related to recent medication changes and transient hypotension due to baseline meds and concurrent nitroglycerin use this morning.  I doubt stroke or other intracranial abnormality, though she does have history of carotid stenosis as well.  Her chest pain refractory to nitroglycerin is concerning as well given recent stent several months ago.  Will obtain PE study and CTA of the neck, as she would be high risk for vascular abnormalities in the setting of cancer history.  Aspirin given.  Held off on any additional nitroglycerin since she is already taken 3 doses at home and developed a headache.  Pain is also dramatically improved but still mildly present.    EKG showed nonspecific T wave changes in V1/V2, not radically changed from priors.  No other new signs of ischemia.  Labs are reassuring overall.   She has mild anemia but no leukocytosis.  Mild TAMIR is also present.  D-dimer was elevated.  BNP was within normal limits.  Troponin was modestly elevated at 38 but decreased down to 33 on repeat testing.  These values appear roughly in line with where they have been over the last several months.  CTA of the neck was unremarkable.  CT chest showed no evidence of PE or pneumonia, but did show diffuse coronary artery calcifications.  Her presentation was discussed with cardiology through [facility].  Due to issues with blood pressure management and ongoing anginal type chest discomfort, recommended that she at least undergo a stress test before being discharged home.  I attempted to arrange for this to be done at our facility tomorrow and admit her here overnight.  However, hospitalist expressed concern about cardiac history, ongoing pain, and possible new T wave changes on EKG.  For this reason, declined admission.  I placed her on an internal transfer list through [facility] and unfortunately there are extensive delays due to capacity issues.  This will likely require that she board overnight in the emergency department.  I offered to explore transfer outside of our organization, but patient and  declined, as they would prefer to stay within the system and with her existing cardiology group.  Stress test was ordered for the morning and hopefully we will be able to complete this from the emergency department while awaiting potential transfer sooner.  Home medications were ordered.  Patient will be signed out to Dr. Coleman to follow-up on disposition.    New Prescriptions    No medications on file     Final diagnoses:   Chest pain, unspecified type   Vasovagal syncope   Elevated troponin     8/18/2024   [facility] EMERGENCY DEPT       Jose Resendiz MD  08/18/24 2054

## 2024-08-18 NOTE — PROGRESS NOTES
Transfer Type: [facility]  Transfer Triage Note    Originating unit: [facility] ED    Final intended location for transfer: [facility]    Tele required: Yes    Anticipated primary team on unit: Team: Hospitalist     Patient added to Interhospital transfer list?  Yes    Brief case description:     Ms. Dorman is a 74 yo F with PMH of CAD with stent placement in 05/2024. Has had chest pain off and on since - has been following with cardiology regularly with uptitration of imdur, including 3 days ago. She arrived at the [facility] ED with chest pain and a syncopal episode. Pain started yesterday but worsened today, she had taken 3 nitroglycerin prior to presentation which did improve the pain. Did have mildly elevated BP today.  Troponin was mildly elevated but flat on recheck (38 ->33) which is near baseline. [facility] ED did talk to cardiology who recommended stress test but not to change any BP meds at this point.     Still has mild but significantly improved chest pain. CT PE negative. Hospitalist at [facility] does not feel comfortable admitting.     Patient accepted to [facility] for a cardiac bed for stress test, although that test can be done at [facility] and likely will be able to be done in the ED on 8/19 AM prior to when [facility] will have a bed available. DId discuss with the ED provider to try again with the hospitalist to see if they are willing to admit for AM stress test and if not given that she will likely board in their ED - the plan will be to try and get a stress test on 8/19 AM if she has not been able to transfer to [facility] by then (unlikely given bed situation).     Marcia Siddiqui MD

## 2024-08-19 ENCOUNTER — APPOINTMENT (OUTPATIENT)
Dept: CARDIOLOGY | Facility: CLINIC | Age: 74
End: 2024-08-19
Attending: EMERGENCY MEDICINE
Payer: MEDICARE

## 2024-08-19 VITALS
HEART RATE: 78 BPM | BODY MASS INDEX: 24.49 KG/M2 | OXYGEN SATURATION: 98 % | RESPIRATION RATE: 18 BRPM | HEIGHT: 65 IN | WEIGHT: 147 LBS | TEMPERATURE: 98.4 F | SYSTOLIC BLOOD PRESSURE: 108 MMHG | DIASTOLIC BLOOD PRESSURE: 61 MMHG

## 2024-08-19 PROCEDURE — 250N000013 HC RX MED GY IP 250 OP 250 PS 637: Performed by: STUDENT IN AN ORGANIZED HEALTH CARE EDUCATION/TRAINING PROGRAM

## 2024-08-19 PROCEDURE — 999N000208 ECHO STRESS ECHOCARDIOGRAM

## 2024-08-19 PROCEDURE — 93350 STRESS TTE ONLY: CPT | Mod: 26 | Performed by: INTERNAL MEDICINE

## 2024-08-19 PROCEDURE — 96374 THER/PROPH/DIAG INJ IV PUSH: CPT | Mod: 25

## 2024-08-19 PROCEDURE — 93321 DOPPLER ECHO F-UP/LMTD STD: CPT | Mod: 26 | Performed by: INTERNAL MEDICINE

## 2024-08-19 PROCEDURE — C8928 TTE W OR W/O FOL W/CON,STRES: HCPCS

## 2024-08-19 PROCEDURE — 93018 CV STRESS TEST I&R ONLY: CPT | Performed by: INTERNAL MEDICINE

## 2024-08-19 PROCEDURE — 255N000002 HC RX 255 OP 636: Performed by: INTERNAL MEDICINE

## 2024-08-19 PROCEDURE — 93325 DOPPLER ECHO COLOR FLOW MAPG: CPT | Mod: 26 | Performed by: INTERNAL MEDICINE

## 2024-08-19 PROCEDURE — 93016 CV STRESS TEST SUPVJ ONLY: CPT | Performed by: INTERNAL MEDICINE

## 2024-08-19 PROCEDURE — 250N000013 HC RX MED GY IP 250 OP 250 PS 637: Performed by: EMERGENCY MEDICINE

## 2024-08-19 PROCEDURE — 250N000011 HC RX IP 250 OP 636: Performed by: STUDENT IN AN ORGANIZED HEALTH CARE EDUCATION/TRAINING PROGRAM

## 2024-08-19 RX ORDER — ACETAMINOPHEN 325 MG/1
650 TABLET ORAL ONCE
Status: COMPLETED | OUTPATIENT
Start: 2024-08-19 | End: 2024-08-19

## 2024-08-19 RX ORDER — ACETAMINOPHEN 500 MG
500 TABLET ORAL ONCE
Status: DISCONTINUED | OUTPATIENT
Start: 2024-08-19 | End: 2024-08-19

## 2024-08-19 RX ADMIN — CLOPIDOGREL 75 MG: 75 TABLET ORAL at 10:51

## 2024-08-19 RX ADMIN — ESCITALOPRAM OXALATE 10 MG: 10 TABLET ORAL at 10:42

## 2024-08-19 RX ADMIN — EZETIMIBE 10 MG: 10 TABLET ORAL at 10:42

## 2024-08-19 RX ADMIN — ISOSORBIDE MONONITRATE 15 MG: 30 TABLET, EXTENDED RELEASE ORAL at 10:41

## 2024-08-19 RX ADMIN — HEPARIN, PORCINE (PF) 10 UNIT/ML INTRAVENOUS SYRINGE 5 ML: at 13:07

## 2024-08-19 RX ADMIN — HUMAN ALBUMIN MICROSPHERES AND PERFLUTREN 7 ML: 10; .22 INJECTION, SOLUTION INTRAVENOUS at 10:14

## 2024-08-19 RX ADMIN — LEVOTHYROXINE SODIUM 25 MCG: 25 TABLET ORAL at 10:41

## 2024-08-19 RX ADMIN — ATORVASTATIN CALCIUM 80 MG: 40 TABLET, FILM COATED ORAL at 10:42

## 2024-08-19 RX ADMIN — ACETAMINOPHEN 825 MG: 325 TABLET, FILM COATED ORAL at 09:22

## 2024-08-19 RX ADMIN — GABAPENTIN 600 MG: 300 CAPSULE ORAL at 10:48

## 2024-08-19 RX ADMIN — LORAZEPAM 0.5 MG: 0.5 TABLET ORAL at 10:51

## 2024-08-19 RX ADMIN — PANTOPRAZOLE SODIUM 40 MG: 40 TABLET, DELAYED RELEASE ORAL at 10:49

## 2024-08-19 RX ADMIN — CARVEDILOL 3.12 MG: 3.12 TABLET, FILM COATED ORAL at 11:33

## 2024-08-19 RX ADMIN — OXYCODONE HYDROCHLORIDE 5 MG: 5 TABLET ORAL at 10:51

## 2024-08-19 ASSESSMENT — ACTIVITIES OF DAILY LIVING (ADL)
ADLS_ACUITY_SCORE: 37

## 2024-08-19 NOTE — DISCHARGE INSTRUCTIONS
Please follow-up with cardiology.      Continue to closely monitor blood pressures.  You could even keep a journal and let the cardiology service know what you are finding.    If at anytime you have any significant worsening, changes or concerns return at any time.    I hope that the rest of your week goes very smoothly!!

## 2024-08-19 NOTE — ED PROVIDER NOTES
SIGN OUT NOTE    Patient signed out to me at change of shift by Dr. Coleman.    This is a 73-year-old female, history of hypertension, chronic kidney disease, breast and bladder cancer, carotid stenosis, coronary disease with stenting who presented with chest pressure despite 3 doses of nitroglycerin at home.  She has history of cardiac stent in May 2024.  She has had issues with chest pain since then and they have been trying to manage her with Imdur.  She had a syncopal episode yesterday at [facility] and presented to the ED.    Please see initial workup by Dr. Resendiz.  He spoke with cardiology.  Recommended admission for stress test.  Issues with finding bed placement.  Patient boarded in the ED overnight.    I ordered a stress test this morning.    Results:    Name: NICHOLAS DONNELLY  MRN: [id number]  : 1950  Study Date: 2024 09:44 AM  Age: 73 yrs  Gender: Female  Patient Location: [facility]  Reason For Study: Chest Pain  History: HTN,CAD,Stent  Ordering Physician: LEANDRO GARCIA  Referring Physician: Phani Jason MD  Performed By: Isabell Mcgee     BSA: 1.7 m2  Height: 65 in  Weight: 147 lb  HR: 74  BP: 114/66 mmHg  ______________________________________________________________________________  Procedure  Stress Echo Complete. Optison (NDC #[id number]) given intravenously.  ______________________________________________________________________________  Interpretation Summary  A treadmill exercise test according to the Sanya protocol was performed.  The patient exercised 6:30.  The patient had 2/10 chest pain at rest that did not change with exertion.  The EKG portion of this stress test was negative for inducible ischemia (see  echo results below).  Normal resting wall motion and no stress-induced wall motion abnormality.     This was a normal stress echocardiogram with no evidence of  stress-induced  ischemia.  ______________________________________________________________________________  Stress  Exercise was stopped due to fatigue.  There was a borderline hypertensive BP response to exercise.  The patient exercised 6:30.  The patient had 2/10 chest pain at rest that did not change with exertion.  Target Heart Rate was achieved.  No arrhythmia noted.  A treadmill exercise test according to the Sanya protocol was performed.  The EKG portion of this stress test was negative for inducible ischemia (see  echo results below).  Normal resting wall motion and no stress-induced wall motion abnormality.  Normal left ventricular function and wall motion at rest and post-stress.  This was a normal stress echocardiogram with no evidence of stress-induced  ischemia.     Baseline  The patient is in normal sinus rhythm.  Resting ECG is normal.  Normal left ventricular wall motion.  The visual ejection fraction is estimated at 55-60%.     Stress Results         Protocol:  Sanya Protocol        Maximum Predicted HR:   146 bpm         Target HR: 124 bpm               % Maximum Predicted HR: 93 %              Duration Heart    Stage   (mm:ss)  Rate    BP                     Comment                    (bpm)   Stage 1   3:00    120   192/602/10 chest pain form rest- unchanged   Stage 2   3:33    136   196/602/10 chest pain from rest- unchanged, RPP-                                 [id number], FAC: Average, Flynn Score: 6  RecoveryR  6:48     82   144/701/10 chest pain- decreased for rest/exercise             Stress Duration:   6:33 mm:ss        Recovery Time: 6:48 mm:ss          Maximum Stress HR: 136 bpm           METS:          8     Mitral Valve  The mitral valve is normal in structure and function. There is trace mitral  regurgitation.     Tricuspid Valve  The tricuspid valve is normal in structure and function. There is trace  tricuspid regurgitation.     Aortic Valve  No aortic regurgitation is present. No aortic  stenosis is present.     Pericardium  There is no pericardial effusion.  ______________________________________________________________________________  MMode/2D Measurements & Calculations  Ao root diam: 3.1 cm  asc Aorta Diam: 3.7 cm  Ao root diam index Ht(cm/m): 1.9  Ao root diam index BSA (cm/m2): 1.8  Asc Ao diam index BSA (cm/m2): 2.1  Asc Ao diam index Ht(cm/m): 2.2     Doppler Measurements & Calculations  TR max nas: 259.3 cm/sec  TR max P.9 mmHg     ______________________________________________________________________________  Report approved by: Mika Noguera 2024 12:01 PM     No obvious abnormal findings on stress echo.  I discussed with cardiology.  Okay to discharge home.  Follow-up with cardiology to discuss further management and plans.  Patient comfortable with this plan also.    Clinical impression:  (R07.9) Chest pain, unspecified type    (R55) Vasovagal syncope    (R79.89) Elevated troponin          Sarah Alegria MD  24 [id number]

## 2024-08-19 NOTE — ED PROVIDER NOTES
I was asked to take over the care of this patient at shift change by Dr. Resendiz.  Dr. Resendiz stated that the patient presented to the ER about 10 hours ago secondary concerns of chest pain symptoms that she has had on and off since May of this year.  Apparently had coronary artery stents placed in May at [facility].  She is being followed by cardiology and they have increased her Coreg and oral nitrates to try to control her chest pain symptoms since but she continues to have them.  She had some chest pain this morning when she awoke from sleep and then went to [facility] today.  She had a syncopal episode at [facility] prompting the ambulance ride to the ER.  Large workup was performed but Dr. Resendiz.  I reviewed Dr. Resendiz's physician note and also reviewed the imaging and laboratory study results.  Dr. Resendiz stated they consulted with cardiology who felt that the patient needed a cardiac stress test performed to further evaluate for her chest pain symptoms.  Cardiology felt comfortable with the stress test being performed at this hospital on Monday and suggested patient be admitted overnight until that test can be performed.  He stated the attempt was made to admit the patient to this hospital and Dr. Watson was consulted but he did not want to admit the patient because she had evidence of a new T wave inversion in lead II of her EKG.  There are no other beds available in the [facility] system so the patient is currently boarding in our emergency room.  Dr. Resendiz stated that he did place a order through the ER for a cardiac stress test to be performed later today he felt it is possible that effort stress test was reassuring that she could be discharged from the ER.  If her testing proves abnormal, then further recommendations and consultation with cardiology will be needed.    Patient has been resting comfortably through the night shift.  Patient was signed out to Dr. Alegria at shift change.     Franck Coleman  Javier,   08/19/24 0249

## 2024-08-19 NOTE — ED NOTES
Bedside report from Pantera PEDRAZA RN and care assumed at this time. Patient states she continues to have 2/10 chest pain. She also reports she had worse pain during the night, but she didn't report to anyone. Tele is SR at this time.

## 2024-08-19 NOTE — ED NOTES
Patient is back from Stress Echo. She states chest pain is 1/10 and c/o headache 4/10. AM meds given and patient requested Oxycodone and Ativan . Placed back on Tele.  brought her lunch.

## 2024-08-21 ENCOUNTER — HOSPITAL ENCOUNTER (OUTPATIENT)
Dept: CARDIAC REHAB | Facility: CLINIC | Age: 74
Discharge: HOME | End: 2024-08-21
Attending: INTERNAL MEDICINE
Payer: MEDICARE

## 2024-08-21 PROCEDURE — 93798 PHYS/QHP OP CAR RHAB W/ECG: CPT

## 2024-09-04 ENCOUNTER — OFFICE VISIT (OUTPATIENT)
Dept: CARDIOLOGY | Facility: CLINIC | Age: 74
End: 2024-09-04
Payer: COMMERCIAL

## 2024-09-04 ENCOUNTER — HOSPITAL ENCOUNTER (OUTPATIENT)
Dept: CARDIAC REHAB | Facility: CLINIC | Age: 74
Discharge: HOME | End: 2024-09-04
Attending: INTERNAL MEDICINE
Payer: MEDICARE

## 2024-09-04 VITALS
HEIGHT: 65 IN | HEART RATE: 73 BPM | SYSTOLIC BLOOD PRESSURE: 138 MMHG | BODY MASS INDEX: 24.32 KG/M2 | OXYGEN SATURATION: 96 % | WEIGHT: 146 LBS | RESPIRATION RATE: 16 BRPM | DIASTOLIC BLOOD PRESSURE: 80 MMHG

## 2024-09-04 DIAGNOSIS — R07.9 CHEST PAIN, UNSPECIFIED TYPE: ICD-10-CM

## 2024-09-04 DIAGNOSIS — I10 BENIGN ESSENTIAL HYPERTENSION: Primary | ICD-10-CM

## 2024-09-04 DIAGNOSIS — E78.5 HYPERLIPIDEMIA LDL GOAL <70: ICD-10-CM

## 2024-09-04 DIAGNOSIS — R42 LIGHTHEADEDNESS: ICD-10-CM

## 2024-09-04 DIAGNOSIS — I25.10 CORONARY ARTERY DISEASE INVOLVING NATIVE CORONARY ARTERY OF NATIVE HEART WITHOUT ANGINA PECTORIS: ICD-10-CM

## 2024-09-04 PROCEDURE — 93798 PHYS/QHP OP CAR RHAB W/ECG: CPT

## 2024-09-04 PROCEDURE — 99214 OFFICE O/P EST MOD 30 MIN: CPT

## 2024-09-04 RX ORDER — AMLODIPINE BESYLATE 5 MG/1
5 TABLET ORAL DAILY
Qty: 30 TABLET | Refills: 1 | Status: SHIPPED | OUTPATIENT
Start: 2024-09-04

## 2024-09-04 ASSESSMENT — PAIN SCALES - GENERAL: PAINLEVEL: NO PAIN (0)

## 2024-09-04 NOTE — LETTER
9/4/2024    Phani Albright PA-C  [address] [facility] [address]    RE: Michaela Dorman       Dear Colleague,     I had the pleasure of seeing Michaela Dorman in the [facility].              ~Cardiology Clinic Visit~    Michaela Dorman MRN# [id number]   YOB: 1950 Age: 73 year old   Primary Cardiologist: Dr. Choi            Assessment and Plan:   Michaela Dorman is a very pleasant 73 year old female who is here today for follow up on blood pressure     Coronary artery disease  -Coronary angiogram 4/2024- PCI with AMBREEN x2 to the CHLOE  -Coronary angiogram 6/2024-  residual moderate stenosis of the LCx and stenosis distal to the previously placed stents was evaluated with iFR which was not hemodynamically significant (0.96). The mid LAD lesion was negative by iFR (0.94).    DAPT: Aspirin 81 mg daily, clopidogrel 75 mg daily  Statin: Atorvastatin 80 mg daily  BB: Coreg 3.125 mg twice daily  ACE/ARB: None   Cardiac rehab: Attending     Labile blood pressure with intermittent lightheadedness and chest discomfort  -Most recent stress test done 8/19/2024 was negative for ischemia.  Patient had normal resting wall motion and no stress-induced wall motion abnormality.  LVEF was 55 to 60%.    -Blood pressure has been managed with Coreg 3.125 mg twice daily.  Chest discomfort has been managed with Imdur 15 mg twice daily.    -Recently, patient has been experiencing episodes of lightheadedness so she has been holding either the Imdur or the Coreg.  Yesterday, patient only took her Imdur in the morning and had an episode of lightheadedness and had to lay down for 30 minutes. Upon reviewing her blood pressure log, patients blood pressure was normotensive when she became lightheaded. It is possible lightheadedness could be secondary to Imdur.  Will discontinue the Imdur and start amlodipine 5 mg daily.  Will continue Coreg 3.125 mg twice daily.    -Encouraged patient to continue to  monitor her blood pressure and symptoms.  Could increase or decrease amlodipine depending on her symptomatology and blood pressure readings.    HLD  -Lipid profile 4/2024-  and LDL 85  -Managed with atorvastatin 80 mg daily     History of bladder cancer   CKD   Carotid artery disease s/p CEA 3/2021          Follow up with MARCELINO Ty on September 25th       Anais Payne PA-C  Physician Assistant   [facility]  Pager: [phone]          History of Presenting Illness:    Michaela Dorman is a very pleasant 73 year old female with a history of CAD (status post PCI of the first obtuse marginal branch with known residual significant circumflex disease and moderate LAD disease 4/2024), HTN, HLD, history of bladder cancer, CKD, and carotid artery disease (s/p CEA 2021).     In brief, patient received AMBREEN x2 to the OM1 in April 2024 after reporting exertional chest discomfort. It was noted she had extensive coronary calcification on CT and she had an abnormal exercise stress echo.     Patient was hospitalized at [facility] from 6/12-6/14/2024 for unstable angina. Coronary angiogram demonstrated residual moderate stenosis of the LCx and stenosis distal to the previously placed stents was evaluated with iFR which was not hemodynamically significant (0.96). The mid LAD lesion was negative by iFR (0.94). Previous placed OM stents were patent.      Echocardiogram noted LVEF 55-60% with early diastolic dysfunction.     I had the pleasure of  meeting Ms. Dorman in July of 2024.  At that time, she had been experiencing labile blood pressures with systolic readings in the 60s.  Her Imdur was discontinued and she was encouraged to continue to monitor her blood pressure at home.    Patient was seen by her PCP in late July and her blood pressure was noted to be elevated and she was endorsing chest discomfort.  Her PCP resumed the Imdur 15 mg and encouraged her to take it at bedtime.    Patient  continued to report chest discomfort so it was recommended for patient to trial taking Imdur 15 mg twice a day.    Patient was recently evaluated in the emergency room on 8/18/2024 after she had a syncopal episode at [employer].  Patient states she was sitting during the [employer] service and felt chest discomfort so she took a nitroglycerin and the chest pain improved but did not completely resolve so 5 minutes later she took another nitroglycerin and passed out.  She had an exercise stress echocardiogram that was negative for inducible ischemia and her LVEF was 55 to 60% so she was discharged home.    Patient reports feeling well.  She has been trying to figure out a medication regimen that works for her that provides blood pressure control and chest pain management.  She reports that she experiences lightheadedness after taking her Imdur or after taking the carvedilol and the Imdur.  She experiences chest discomfort several times during the day.  Rates the pain about a 2/10.  These chest pain episodes have been ongoing since her PCI in May.  The Imdur does help decrease the chest discomfort.  Patient is continuing to participate in cardiac rehab and exercise daily.  She does not experience any lightheadedness or chest pain when she exerts itself.  She does state her chest pain episodes are worse in the morning.    Denies shortness of breath, orthopnea and PND. Denies palpitations, lightheadedness, dizziness, near syncope and syncope. Denies epistaxis, ecchymosis, and melena.     Taking medications daily as prescribed.     Blood pressure 138/80 and HR 73 in clinic today. Blood pressure at home 117-130 systolic.         Social History       Social History     Socioeconomic History     Marital status:      Spouse name: ozzie     Number of children: 5     Years of education: Not on file     Highest education level: Not on file   Occupational History     Employer: [employer]   Tobacco Use     Smoking status: Former      Current packs/day: 0.00     Average packs/day: 3.0 packs/day for 10.0 years (30.0 ttl pk-yrs)     Types: Cigarettes     Start date: 1969     Quit date: 1979     Years since quittin.7     Passive exposure: Never     Smokeless tobacco: Never     Tobacco comments:     approx. 3 packs daily   Vaping Use     Vaping status: Never Used   Substance and Sexual Activity     Alcohol use: Not Currently     Comment: Stopped drinking a few weeks ago (2024)     Drug use: No     Sexual activity: Yes     Partners: Male   Other Topics Concern      Service Not Asked     Blood Transfusions Not Asked     Caffeine Concern No     Occupational Exposure Not Asked     Hobby Hazards No     Sleep Concern No     Stress Concern Yes     Weight Concern Not Asked     Special Diet Not Asked     Back Care Not Asked     Exercise Yes     Bike Helmet Not Asked     Seat Belt Yes     Self-Exams Not Asked     Parent/sibling w/ CABG, MI or angioplasty before 65F 55M? Not Asked   Social History Narrative     Not on file     Social Determinants of Health     Financial Resource Strain: Low Risk  (2024)    Financial Resource Strain      Within the past 12 months, have you or your family members you live with been unable to get utilities (heat, electricity) when it was really needed?: No   Food Insecurity: Low Risk  (2024)    Food Insecurity      Within the past 12 months, did you worry that your food would run out before you got money to buy more?: No      Within the past 12 months, did the food you bought just not last and you didn t have money to get more?: No   Transportation Needs: Low Risk  (2024)    Transportation Needs      Within the past 12 months, has lack of transportation kept you from medical appointments, getting your medicines, non-medical meetings or appointments, work, or from getting things that you need?: No   Physical Activity: Sufficiently Active (2024)    Exercise Vital Sign      Days of  "Exercise per Week: 5 days      Minutes of Exercise per Session: 100 min   Stress: Stress Concern Present (4/25/2024)    [employer] [place] [employer] - Occupational Stress Questionnaire      Feeling of Stress : To some extent   Social Connections: Unknown (4/25/2024)    Social Connection and Isolation Panel [NHANES]      Frequency of Communication with Friends and Family: Not on file      Frequency of Social Gatherings with Friends and Family: Once a week      Attends Shinto Services: Not on file      Active Member of Clubs or Organizations: Not on file      Attends Club or Organization Meetings: Not on file      Marital Status: Not on file   Interpersonal Safety: Low Risk  (4/25/2024)    Interpersonal Safety      Do you feel physically and emotionally safe where you currently live?: Yes      Within the past 12 months, have you been hit, slapped, kicked or otherwise physically hurt by someone?: No      Within the past 12 months, have you been humiliated or emotionally abused in other ways by your partner or ex-partner?: No   Housing Stability: Low Risk  (4/25/2024)    Housing Stability      Do you have housing? : Yes      Are you worried about losing your housing?: No            Review of Systems:   Please see HPI         Physical Exam:   Vitals: /80 (BP Location: Right arm, Patient Position: Sitting, Cuff Size: Adult Regular)   Pulse 73   Resp 16   Ht 1.651 m (5' 5\")   Wt 66.2 kg (146 lb)   SpO2 96%   BMI 24.30 kg/m     Wt Readings from Last 4 Encounters:   09/04/24 66.2 kg (146 lb)   08/18/24 66.7 kg (147 lb)   08/05/24 64.9 kg (143 lb)   07/09/24 64.9 kg (143 lb)     GEN: well nourished, in no acute distress.  NECK: Supple. JVP was not appreciated.  C/V:  Regular rate and rhythm, no murmur, rub or gallop.    RESP: Respirations are unlabored. Clear to auscultation bilaterally without wheezing, rales, or rhonchi.  EXTREM: Bilateral lower extremities with no edema.   SKIN: Warm and dry. "        Data:   LIPID RESULTS:  Lab Results   Component Value Date    CHOL 194 04/29/2024    CHOL 199 10/08/2020    HDL 88 04/29/2024    HDL 76 10/08/2020    LDL 85 04/29/2024    LDL 87 10/08/2020    TRIG 106 04/29/2024    TRIG 180 (H) 10/08/2020    CHOLHDLRATIO 3.0 08/02/2010     LIVER ENZYME RESULTS:  Lab Results   Component Value Date    AST 26 08/18/2024    AST 20 05/21/2021    ALT 20 08/18/2024    ALT 38 05/21/2021     CBC RESULTS:  Lab Results   Component Value Date    WBC 8.0 08/18/2024    WBC 13.5 (H) 06/24/2021    RBC 3.39 (L) 08/18/2024    RBC 2.99 (L) 06/24/2021    HGB 11.3 (L) 08/18/2024    HGB 9.6 (L) 06/24/2021    HCT 34.5 (L) 08/18/2024    HCT 30.0 (L) 06/24/2021     (H) 08/18/2024     06/24/2021    MCH 33.3 (H) 08/18/2024    MCH 32.1 06/24/2021    MCHC 32.8 08/18/2024    MCHC 32.0 06/24/2021    RDW 14.1 08/18/2024    RDW 14.8 06/24/2021     08/18/2024     06/24/2021     BMP RESULTS:  Lab Results   Component Value Date     08/18/2024     06/24/2021    POTASSIUM 4.4 08/18/2024    POTASSIUM 4.8 11/28/2022    POTASSIUM 4.7 06/24/2021    CHLORIDE 105 08/18/2024    CHLORIDE 108 11/28/2022    CHLORIDE 106 06/24/2021    CO2 20 (L) 08/18/2024    CO2 26 11/28/2022    CO2 24 06/24/2021    ANIONGAP 16 (H) 08/18/2024    ANIONGAP 4 11/28/2022    ANIONGAP 6 06/24/2021    GLC 84 08/18/2024    GLC 93 06/14/2024    GLC 90 11/28/2022     (H) 06/24/2021    BUN 28.4 (H) 08/18/2024    BUN 28 11/28/2022    BUN 30 06/24/2021    CR 1.15 (H) 08/18/2024    CR 1.10 (H) 06/24/2021    GFRESTIMATED 50 (L) 08/18/2024    GFRESTIMATED 48 (L) 03/07/2023    GFRESTIMATED 50 (L) 06/24/2021    GFRESTBLACK 58 (L) 06/24/2021    VANDANA 10.1 08/18/2024    VANDANA 9.6 06/24/2021      A1C RESULTS:  Lab Results   Component Value Date    A1C 5.4 07/26/2023    A1C 5.2 10/08/2020     INR RESULTS:  Lab Results   Component Value Date    INR 0.96 08/18/2024    INR 1.02 06/13/2024    INR 0.91 04/16/2021    INR 0.9  10/01/2020    INR 0.88 02/12/2018            Medications     Current Outpatient Medications   Medication Sig Dispense Refill     acetaminophen (TYLENOL) 500 MG tablet Take 1,000 mg by mouth 3 times daily as needed for mild pain (500MG X 2 = 1,000MG)       aspirin (ASA) 81 MG chewable tablet Take 1 tablet (81 mg) by mouth daily 100 tablet 3     atorvastatin (LIPITOR) 80 MG tablet Take 80 mg by mouth daily       clopidogrel (PLAVIX) 75 MG tablet Take 1 tablet (75 mg) by mouth daily 90 tablet 3     Cyanocobalamin (B-12 PO) Take 1 tablet by mouth daily       escitalopram (LEXAPRO) 10 MG tablet Take 1 tablet (10 mg) by mouth daily 30 tablet 0     ezetimibe (ZETIA) 10 MG tablet Take 1 tablet (10 mg) by mouth daily 90 tablet 3     gabapentin (NEURONTIN) 300 MG capsule Take 2 capsules (600 mg) by mouth 3 times daily 180 capsule 5     isosorbide mononitrate (IMDUR) 30 MG 24 hr tablet Take 1 tablet (30 mg) by mouth daily (Patient taking differently: Take 15 mg by mouth 2 times daily. Take 1/2 tablet (15 mg) twice daily) 90 tablet 3     levothyroxine (SYNTHROID/LEVOTHROID) 25 MCG tablet Take 1 tablet (25 mcg) by mouth daily 90 tablet 1     LORazepam (ATIVAN) 0.5 MG tablet TAKE 1 TABLET (0.5 MG) BY MOUTH EVERY 4 HOURS AS NEEDED FOR ANXIETY, NAUSEA OR SLEEP 15 tablet 0     oxyCODONE (ROXICODONE) 5 MG tablet TAKE 1 TABLET (5 MG) BY MOUTH EVERY 6 HOURS AS NEEDED FOR MODERATE TO SEVERE PAIN 30 tablet 0     pantoprazole (PROTONIX) 40 MG EC tablet Take 1 tablet (40 mg) by mouth daily 90 tablet 3     prochlorperazine (COMPAZINE) 10 MG tablet TAKE 1/2 TABLET BY MOUTH EVERY 6 HOURS AS NEEDED FORNAUSEA/VOMITING (Patient taking differently: Take 5 mg by mouth every 6 hours as needed for nausea or vomiting. Take 1/2 tablet (5 mg) every 6 hours as needed for nausea or vomiting) 30 tablet 2     senna-docusate (SENEXON-S) 8.6-50 MG tablet Take 2 tablets by mouth at bedtime 100 tablet 0     carvedilol (COREG) 3.125 MG tablet Take 1 tablet  (3.125 mg) by mouth 2 times daily (with meals) (Patient not taking: Reported on 9/4/2024) 180 tablet 3     nitroGLYcerin (NITROSTAT) 0.4 MG sublingual tablet For chest pain place 1 tablet under the tongue every 5 minutes for 3 doses. If symptoms persist 5 minutes after 2nd dose call 911. (Patient not taking: Reported on 9/4/2024) 30 tablet 0          Past Medical History     Past Medical History:   Diagnosis Date     Acute kidney injury (H24)      Carotid stenosis, bilateral L80%, R40% 09/02/2020     Central stenosis of spinal canal 12/01/2017    lumbar      DDD (degenerative disc disease), lumbar 12/01/2017     Depressive disorder, not elsewhere classified      Esophageal reflux      ETOH abuse     in remission     Foraminal stenosis of lumbar region 12/01/2017    Complete lumbar spine left at various degrees and to a lesser extent the right as well.     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     Subdural hematoma (H)      Thyroid disease      Urothelial carcinoma (H)      Past Surgical History:   Procedure Laterality Date     ARTHROPLASTY SHOULDER Right 11/17/2015     ARTHROSCOPY KNEE  6/7/2012    Procedure:ARTHROSCOPY KNEE; right knee arthroscopy with partial lateral menisectomy; Surgeon:DAMIÁN MCALLISTER; Location:[facility] OR     BIOPSY VAGINAL N/A 10/27/2021    Procedure: DISTAL URETHRECTOMY;  Surgeon: Leonardo Robles MD;  Location: [facility] OR     COLONOSCOPY N/A 12/22/2017    Procedure: COLONOSCOPY;  colonoscopy;  Surgeon: Chuck Chand MD;  Location: [facility] GI     CV CORONARY ANGIOGRAM N/A 4/23/2024    Procedure: Coronary Angiogram;  Surgeon: Leonardo Marinelli MD;  Location:  [facility] C[facility] LAB     CV CORONARY ANGIOGRAM N/A 6/13/2024    Procedure: Coronary Angiogram;  Surgeon: Rodney  MD Juliette;  Location:  HE[facility] CATH LAB     CV INTRAVASULAR ULTRASOUND N/A 4/23/2024    Procedure: Intravascular Ultrasound;  Surgeon: Leonardo Marinelli MD;  Location:  [facility] CATH LAB     CV LEFT HEART CATH N/A 4/23/2024    Procedure: Left Heart Catheterization;  Surgeon: Leonardo Marinelli MD;  Location:  HEART [facility]ARD[facility] CATH LAB     CV LEFT HEART CATH N/A 6/13/2024    Procedure: Left Heart Catheterization;  Surgeon: Juliette Stevens MD;  Location:  HEART CARD[facility]AC CATH LAB     CV PCI N/A 6/13/2024    Procedure: Percutaneous Coronary Intervention;  Surgeon: Juliette Stevens MD;  Location:  HEART CARDIAC CATH LAB     CV PCI STENT DRUG ELUTING N/A 4/23/2024    Procedure: Percutaneous Coronary Intervention Stent;  Surgeon: Leonardo Marinelli MD;  Location:  HEART CARDIAC CATH LAB     CYSTECTOMY BLADDER RADICAL, ILEAL DIVERSION, COMBINED N/A 6/1/2021    Procedure: RADICAL CYSTECTOMY  WITH ILEAL CONDUIT CREATION,BILATERAL PELVIC LYMPHADENECTOMY;  Surgeon: Leonardo Robles MD;  Location: [facility] OR     CYSTOSCOPY, RETROGRADES, COMBINED Bilateral 3/18/2021    Procedure: CYSTOSCOPY, WITH RETROGRADE PYELOGRAM;  Surgeon: Girish Jack MD;  Location: [facility] OR     CYSTOSCOPY, TRANSURETHRAL RESECTION (TUR) TUMOR BLADDER, COMBINED N/A 3/18/2021    Procedure: CYSTOSCOPY, WITH TRANSURETHRAL RESECTION BLADDER TUMOR;  Surgeon: Girish Jack MD;  Location: [facility] OR     ENDARTERECTOMY CAROTID Left 10/8/2020    Procedure: LEFT CAROTID ENDARTERECTOMY WITH EEG;  Surgeon: Lacho Jasmine MD;  Location:  OR     ESOPHAGOSCOPY, GASTROSCOPY, DUODENOSCOPY (EGD), COMBINED N/A 6/20/2022    Procedure: ESOPHAGOGASTRODUODENOSCOPY, WITH BIOPSY;  Surgeon: Ramses Arenas MD;  Location:  GI     EXCISE MASS AXILLA Left 9/16/2016    Procedure: EXCISE MASS AXILLA;  Surgeon: Keyon Blanco MD;  Location:  OR     HC REMV CATARACT EXTRACAP,INSERT LENS, W/O ECP  6/25/2009    Right eye     INJECT  EPIDURAL LUMBAR N/A 4/11/2019    Procedure: interlaminar epidual steroid injection lumbar 4-5;  Surgeon: Oskar Salvador MD;  [facility]: [facility]     INJECT EPIDURAL LUMBAR Bilateral 9/12/2019    Procedure: lumbar 4-5 epidural interlaminar steroid injection;  Surgeon: Oskar Salvador MD;  Location: [facility]     INSERT PORT VASCULAR ACCESS Right 10/7/2016    Procedure: INSERT PORT VASCULAR ACCESS;  Surgeon: Keyon Blanco MD;  Location: [facility]     IR CHEST PORT PLACEMENT > 5 YRS OF AGE  4/16/2021     MASTECTOMY SIMPLE BILATERAL Bilateral 2/8/2017    Procedure: MASTECTOMY SIMPLE BILATERAL;  Surgeon: Keyon Blanco MD;  [facility]: [facility]     OPEN REDUCTION INTERNAL FIXATION FOOT Right 3/20/2015    Procedure: OPEN REDUCTION INTERNAL FIXATION FOOT;  Surgeon: Javi Herrmann DPM;  Location: [facility] OR     OPTICAL TRACKING SYSTEM FUSION SPINE POSTERIOR LUMBAR TWO LEVELS N/A 2/4/2020    Procedure: LUMBAR 2-SACRAL1 TRANSFORMINAL INTERBODY FUSION;  Surgeon: Lenny Gomes MD;  Location: [facility] OR     REMOVE PORT VASCULAR ACCESS Right 2/14/2018    Procedure: REMOVE PORT VASCULAR ACCESS;  right intra jugular port removal;  Surgeon: Keyon Blanco MD;  Location: [facility]     SHOULDER SURGERY Right 11/2015     ZZC LIGATE FALLOPIAN TUBE       ZZC NONSPECIFIC PROCEDURE  1956    ankle fracture surgery     Family History   Problem Relation Age of Onset     Hypertension Mother      Thyroid Disease Mother      Cerebrovascular Disease Mother      Hypertension Father      Cerebrovascular Disease Father      Cancer Father         skin     Thyroid Disease Sister      Diabetes Brother         type 2     Depression Sister      Breast Cancer Sister         age 47     Cancer Sister         skin     Cancer Brother         inside nose            Allergies   Oxybutynin      This note was completed in part using dictation via the Dragon voice recognition software. Some word and grammatical errors may occur and must be interpreted in the appropriate  clinical context.  If there are any questions pertaining to this issue, please contact me for further clarification.      Thank you for allowing me to participate in the care of your patient.      Sincerely,     SANDY [facility]  cc:   Indra Pham MD  No address on file

## 2024-09-04 NOTE — PATIENT INSTRUCTIONS
Thank you for your visit with the [facility] today.    Today's plan:   Medication changes:   Discontinue imdur   Start amlodipine tomorrow   Check your blood pressure at home. If the top number is >140, please call and update the clinic.   Follow up with MARCELINO Ty in 3 weeks     If you have questions or concerns please call the nurse team at [phone] or send a [employer] message.     Scheduling phone number: [phone]    On Fridays call: [phone] as the [facility] Clinic office is closed     It was a pleasure seeing you today!     Anais Payne PA-C   Physician Assistant   [facility]

## 2024-09-04 NOTE — PROGRESS NOTES
~Cardiology Clinic Visit~    Michaela Dorman MRN# [id number]   YOB: 1950 Age: 73 year old   Primary Cardiologist: Dr. Choi            Assessment and Plan:   Michaela Dorman is a very pleasant 73 year old female who is here today for follow up on blood pressure     Coronary artery disease  -Coronary angiogram 4/2024- PCI with AMBREEN x2 to the CHLOE  -Coronary angiogram 6/2024-  residual moderate stenosis of the LCx and stenosis distal to the previously placed stents was evaluated with iFR which was not hemodynamically significant (0.96). The mid LAD lesion was negative by iFR (0.94).    DAPT: Aspirin 81 mg daily, clopidogrel 75 mg daily  Statin: Atorvastatin 80 mg daily  BB: Coreg 3.125 mg twice daily  ACE/ARB: None   Cardiac rehab: Attending     Labile blood pressure with intermittent lightheadedness and chest discomfort  -Most recent stress test done 8/19/2024 was negative for ischemia.  Patient had normal resting wall motion and no stress-induced wall motion abnormality.  LVEF was 55 to 60%.    -Blood pressure has been managed with Coreg 3.125 mg twice daily.  Chest discomfort has been managed with Imdur 15 mg twice daily.    -Recently, patient has been experiencing episodes of lightheadedness so she has been holding either the Imdur or the Coreg.  Yesterday, patient only took her Imdur in the morning and had an episode of lightheadedness and had to lay down for 30 minutes. Upon reviewing her blood pressure log, patients blood pressure was normotensive when she became lightheaded. It is possible lightheadedness could be secondary to Imdur.  Will discontinue the Imdur and start amlodipine 5 mg daily.  Will continue Coreg 3.125 mg twice daily.    -Encouraged patient to continue to monitor her blood pressure and symptoms.  Could increase or decrease amlodipine depending on her symptoms and blood pressure readings moving forward.    HLD  -Lipid profile 4/2024-  and LDL 85  -Managed  with atorvastatin 80 mg daily     History of bladder cancer   CKD   Carotid artery disease s/p CEA 3/2021          Follow up with MARCELINO Ty on September 25th       Anais Payne PA-C  Physician Assistant   [facility]  Pager: [phone]          History of Presenting Illness:    Michaela Dorman is a very pleasant 73 year old female with a history of CAD (status post PCI of the first obtuse marginal branch with known residual significant circumflex disease and moderate LAD disease 4/2024), HTN, HLD, history of bladder cancer, CKD, and carotid artery disease (s/p CEA 2021).     In brief, patient received AMBREEN x2 to the OM1 in April 2024 after reporting exertional chest discomfort. It was noted she had extensive coronary calcification on CT and she had an abnormal exercise stress echo.     Patient was hospitalized at [facility] from 6/12-6/14/2024 for unstable angina. Coronary angiogram demonstrated residual moderate stenosis of the LCx and stenosis distal to the previously placed stents was evaluated with iFR which was not hemodynamically significant (0.96). The mid LAD lesion was negative by iFR (0.94). Previous placed OM stents were patent.      Echocardiogram noted LVEF 55-60% with early diastolic dysfunction.     I had the pleasure of  meeting Ms. Dorman in July of 2024.  At that time, she had been experiencing labile blood pressures with systolic readings in the 60s.  Her Imdur was discontinued and she was encouraged to continue to monitor her blood pressure at home.    Patient was seen by her PCP in late July and her blood pressure was noted to be elevated and she was endorsing chest discomfort.  Her PCP resumed the Imdur 15 mg and encouraged her to take it at bedtime.    Patient continued to report chest discomfort so it was recommended for patient to trial taking Imdur 15 mg twice a day.    Patient was recently evaluated in the emergency room on 8/18/2024 after she had a  syncopal episode at [employer].  Patient states she was sitting during the [employer] service and felt chest discomfort so she took a nitroglycerin and the chest pain improved but did not completely resolve so 5 minutes later she took another nitroglycerin and passed out.  She had an exercise stress echocardiogram that was negative for inducible ischemia and her LVEF was 55 to 60% so she was discharged home.    Patient reports feeling well.  She has been trying to figure out a medication regimen that works for her that provides blood pressure control and chest pain management.  She reports that she experiences lightheadedness after taking her Imdur or after taking the carvedilol and the Imdur.  She experiences chest discomfort several times during the day.  Rates the pain about a 2/10.  These chest pain episodes have been ongoing since her PCI in May.  The Imdur does help decrease the chest discomfort.  Patient is continuing to participate in cardiac rehab and exercise daily.  She does not experience any lightheadedness or chest pain when she exerts itself.  She does state her chest pain episodes are worse in the morning.    Denies shortness of breath, orthopnea and PND. Denies palpitations, lightheadedness, dizziness, near syncope and syncope. Denies epistaxis, ecchymosis, and melena.     Taking medications daily as prescribed.     Blood pressure 138/80 and HR 73 in clinic today. Blood pressure at home 117-130 systolic.         Social History       Social History     Socioeconomic History    Marital status:      Spouse name: ozzie    Number of children: 5    Years of education: Not on file    Highest education level: Not on file   Occupational History     Employer: [employer]   Tobacco Use    Smoking status: Former     Current packs/day: 0.00     Average packs/day: 3.0 packs/day for 10.0 years (30.0 ttl pk-yrs)     Types: Cigarettes     Start date: 1969     Quit date: 1979     Years since quittin.7      Passive exposure: Never    Smokeless tobacco: Never    Tobacco comments:     approx. 3 packs daily   Vaping Use    Vaping status: Never Used   Substance and Sexual Activity    Alcohol use: Not Currently     Comment: Stopped drinking a few weeks ago (july 2024)    Drug use: No    Sexual activity: Yes     Partners: Male   Other Topics Concern     Service Not Asked    Blood Transfusions Not Asked    Caffeine Concern No    Occupational Exposure Not Asked    Hobby Hazards No    Sleep Concern No    Stress Concern Yes    Weight Concern Not Asked    Special Diet Not Asked    Back Care Not Asked    Exercise Yes    Bike Helmet Not Asked    Seat Belt Yes    Self-Exams Not Asked    Parent/sibling w/ CABG, MI or angioplasty before 65F 55M? Not Asked   Social History Narrative    Not on file     Social Determinants of Health     Financial Resource Strain: Low Risk  (4/25/2024)    Financial Resource Strain     Within the past 12 months, have you or your family members you live with been unable to get utilities (heat, electricity) when it was really needed?: No   Food Insecurity: Low Risk  (4/25/2024)    Food Insecurity     Within the past 12 months, did you worry that your food would run out before you got money to buy more?: No     Within the past 12 months, did the food you bought just not last and you didn t have money to get more?: No   Transportation Needs: Low Risk  (4/25/2024)    Transportation Needs     Within the past 12 months, has lack of transportation kept you from medical appointments, getting your medicines, non-medical meetings or appointments, work, or from getting things that you need?: No   Physical Activity: Sufficiently Active (4/25/2024)    Exercise Vital Sign     Days of Exercise per Week: 5 days     Minutes of Exercise per Session: 100 min   Stress: Stress Concern Present (4/25/2024)    [employer] - Occupational Stress Questionnaire     Feeling of Stress : To some  "extent   Social Connections: Unknown (4/25/2024)    Social Connection and Isolation Panel [NHANES]     Frequency of Communication with Friends and Family: Not on file     Frequency of Social Gatherings with Friends and Family: Once a week     Attends Restoration Services: Not on file     Active Member of Clubs or Organizations: Not on file     Attends Club or Organization Meetings: Not on file     Marital Status: Not on file   Interpersonal Safety: Low Risk  (4/25/2024)    Interpersonal Safety     Do you feel physically and emotionally safe where you currently live?: Yes     Within the past 12 months, have you been hit, slapped, kicked or otherwise physically hurt by someone?: No     Within the past 12 months, have you been humiliated or emotionally abused in other ways by your partner or ex-partner?: No   Housing Stability: Low Risk  (4/25/2024)    Housing Stability     Do you have housing? : Yes     Are you worried about losing your housing?: No            Review of Systems:   Please see HPI         Physical Exam:   Vitals: /80 (BP Location: Right arm, Patient Position: Sitting, Cuff Size: Adult Regular)   Pulse 73   Resp 16   Ht 1.651 m (5' 5\")   Wt 66.2 kg (146 lb)   SpO2 96%   BMI 24.30 kg/m     Wt Readings from Last 4 Encounters:   09/04/24 66.2 kg (146 lb)   08/18/24 66.7 kg (147 lb)   08/05/24 64.9 kg (143 lb)   07/09/24 64.9 kg (143 lb)     GEN: well nourished, in no acute distress.  NECK: Supple. JVP was not appreciated.  C/V:  Regular rate and rhythm, no murmur, rub or gallop.    RESP: Respirations are unlabored. Clear to auscultation bilaterally without wheezing, rales, or rhonchi.  EXTREM: Bilateral lower extremities with no edema.   SKIN: Warm and dry.        Data:   LIPID RESULTS:  Lab Results   Component Value Date    CHOL 194 04/29/2024    CHOL 199 10/08/2020    HDL 88 04/29/2024    HDL 76 10/08/2020    LDL 85 04/29/2024    LDL 87 10/08/2020    TRIG 106 04/29/2024    TRIG 180 (H) " 10/08/2020    CHOLHDLRATIO 3.0 08/02/2010     LIVER ENZYME RESULTS:  Lab Results   Component Value Date    AST 26 08/18/2024    AST 20 05/21/2021    ALT 20 08/18/2024    ALT 38 05/21/2021     CBC RESULTS:  Lab Results   Component Value Date    WBC 8.0 08/18/2024    WBC 13.5 (H) 06/24/2021    RBC 3.39 (L) 08/18/2024    RBC 2.99 (L) 06/24/2021    HGB 11.3 (L) 08/18/2024    HGB 9.6 (L) 06/24/2021    HCT 34.5 (L) 08/18/2024    HCT 30.0 (L) 06/24/2021     (H) 08/18/2024     06/24/2021    MCH 33.3 (H) 08/18/2024    MCH 32.1 06/24/2021    MCHC 32.8 08/18/2024    MCHC 32.0 06/24/2021    RDW 14.1 08/18/2024    RDW 14.8 06/24/2021     08/18/2024     06/24/2021     BMP RESULTS:  Lab Results   Component Value Date     08/18/2024     06/24/2021    POTASSIUM 4.4 08/18/2024    POTASSIUM 4.8 11/28/2022    POTASSIUM 4.7 06/24/2021    CHLORIDE 105 08/18/2024    CHLORIDE 108 11/28/2022    CHLORIDE 106 06/24/2021    CO2 20 (L) 08/18/2024    CO2 26 11/28/2022    CO2 24 06/24/2021    ANIONGAP 16 (H) 08/18/2024    ANIONGAP 4 11/28/2022    ANIONGAP 6 06/24/2021    GLC 84 08/18/2024    GLC 93 06/14/2024    GLC 90 11/28/2022     (H) 06/24/2021    BUN 28.4 (H) 08/18/2024    BUN 28 11/28/2022    BUN 30 06/24/2021    CR 1.15 (H) 08/18/2024    CR 1.10 (H) 06/24/2021    GFRESTIMATED 50 (L) 08/18/2024    GFRESTIMATED 48 (L) 03/07/2023    GFRESTIMATED 50 (L) 06/24/2021    GFRESTBLACK 58 (L) 06/24/2021    VANDANA 10.1 08/18/2024    VANDANA 9.6 06/24/2021      A1C RESULTS:  Lab Results   Component Value Date    A1C 5.4 07/26/2023    A1C 5.2 10/08/2020     INR RESULTS:  Lab Results   Component Value Date    INR 0.96 08/18/2024    INR 1.02 06/13/2024    INR 0.91 04/16/2021    INR 0.9 10/01/2020    INR 0.88 02/12/2018            Medications     Current Outpatient Medications   Medication Sig Dispense Refill    acetaminophen (TYLENOL) 500 MG tablet Take 1,000 mg by mouth 3 times daily as needed for mild pain (500MG  X 2 = 1,000MG)      aspirin (ASA) 81 MG chewable tablet Take 1 tablet (81 mg) by mouth daily 100 tablet 3    atorvastatin (LIPITOR) 80 MG tablet Take 80 mg by mouth daily      clopidogrel (PLAVIX) 75 MG tablet Take 1 tablet (75 mg) by mouth daily 90 tablet 3    Cyanocobalamin (B-12 PO) Take 1 tablet by mouth daily      escitalopram (LEXAPRO) 10 MG tablet Take 1 tablet (10 mg) by mouth daily 30 tablet 0    ezetimibe (ZETIA) 10 MG tablet Take 1 tablet (10 mg) by mouth daily 90 tablet 3    gabapentin (NEURONTIN) 300 MG capsule Take 2 capsules (600 mg) by mouth 3 times daily 180 capsule 5    isosorbide mononitrate (IMDUR) 30 MG 24 hr tablet Take 1 tablet (30 mg) by mouth daily (Patient taking differently: Take 15 mg by mouth 2 times daily. Take 1/2 tablet (15 mg) twice daily) 90 tablet 3    levothyroxine (SYNTHROID/LEVOTHROID) 25 MCG tablet Take 1 tablet (25 mcg) by mouth daily 90 tablet 1    LORazepam (ATIVAN) 0.5 MG tablet TAKE 1 TABLET (0.5 MG) BY MOUTH EVERY 4 HOURS AS NEEDED FOR ANXIETY, NAUSEA OR SLEEP 15 tablet 0    oxyCODONE (ROXICODONE) 5 MG tablet TAKE 1 TABLET (5 MG) BY MOUTH EVERY 6 HOURS AS NEEDED FOR MODERATE TO SEVERE PAIN 30 tablet 0    pantoprazole (PROTONIX) 40 MG EC tablet Take 1 tablet (40 mg) by mouth daily 90 tablet 3    prochlorperazine (COMPAZINE) 10 MG tablet TAKE 1/2 TABLET BY MOUTH EVERY 6 HOURS AS NEEDED FORNAUSEA/VOMITING (Patient taking differently: Take 5 mg by mouth every 6 hours as needed for nausea or vomiting. Take 1/2 tablet (5 mg) every 6 hours as needed for nausea or vomiting) 30 tablet 2    senna-docusate (SENEXON-S) 8.6-50 MG tablet Take 2 tablets by mouth at bedtime 100 tablet 0    carvedilol (COREG) 3.125 MG tablet Take 1 tablet (3.125 mg) by mouth 2 times daily (with meals) (Patient not taking: Reported on 9/4/2024) 180 tablet 3    nitroGLYcerin (NITROSTAT) 0.4 MG sublingual tablet For chest pain place 1 tablet under the tongue every 5 minutes for 3 doses. If symptoms persist 5  minutes after 2nd dose call 911. (Patient not taking: Reported on 9/4/2024) 30 tablet 0          Past Medical History     Past Medical History:   Diagnosis Date    Acute kidney injury (H24)     Carotid stenosis, bilateral L80%, R40% 09/02/2020    Central stenosis of spinal canal 12/01/2017    lumbar     DDD (degenerative disc disease), lumbar 12/01/2017    Depressive disorder, not elsewhere classified     Esophageal reflux     ETOH abuse     in remission    Foraminal stenosis of lumbar region 12/01/2017    Complete lumbar spine left at various degrees and to a lesser extent the right as well.    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    Subdural hematoma (H)     Thyroid disease     Urothelial carcinoma (H)      Past Surgical History:   Procedure Laterality Date    ARTHROPLASTY SHOULDER Right 11/17/2015    ARTHROSCOPY KNEE  6/7/2012    Procedure:ARTHROSCOPY KNEE; right knee arthroscopy with partial lateral menisectomy; Surgeon:DAMIÁN MCALLISTER; Location:[facility] OR    BIOPSY VAGINAL N/A 10/27/2021    Procedure: DISTAL URETHRECTOMY;  Surgeon: Leonardo Robles MD;  Location: [facility] OR    COLONOSCOPY N/A 12/22/2017    Procedure: COLONOSCOPY;  colonoscopy;  Surgeon: Chuck Chand MD;  Location: [facility] GI    CV CORONARY ANGIOGRAM N/A 4/23/2024    Procedure: Coronary Angiogram;  Surgeon: Leonardo Marinelli MD;  Location: [facility] CARDIAC CATH LAB    CV CORONARY ANGIOGRAM N/A 6/13/2024    Procedure: Coronary Angiogram;  Surgeon: Juliette Stevens MD;  Location: [facility] CARDIAC CATH LAB    CV INTRAVASULAR ULTRASOUND N/A 4/23/2024    Procedure: Intravascular Ultrasound;  Surgeon: Leonardo Marinelli MD;  Location:  HEART CARDIAC CATH LAB    CV LEFT HEART CATH N/A 4/23/2024    Procedure: Left Heart  Catheterization;  Surgeon: Leonardo Marinelli MD;  Location:  HEART CARDIAC CATH LAB    CV LEFT HEART CATH N/A 6/13/2024    Procedure: Left Heart Catheterization;  Surgeon: Juliette Stevens MD;  Location:  HEART CARDIAC CATH LAB    CV PCI N/A 6/13/2024    Procedure: Percutaneous Coronary Intervention;  Surgeon: Juliette Stevens MD;  Location:  HEART CARDIAC CATH LAB    CV PCI STENT DRUG ELUTING N/A 4/23/2024    Procedure: Percutaneous Coronary Intervention Stent;  Surgeon: Leonardo Marinelli MD;  Location:  HEART CARDIAC CATH LAB    CYSTECTOMY BLADDER RADICAL, ILEAL DIVERSION, COMBINED N/A 6/1/2021    Procedure: RADICAL CYSTECTOMY  WITH ILEAL CONDUIT CREATION,BILATERAL PELVIC LYMPHADENECTOMY;  Surgeon: Leonardo Robles MD;  Location: [facility] OR    CYSTOSCOPY, RETROGRADES, COMBINED Bilateral 3/18/2021    Procedure: CYSTOSCOPY, WITH RETROGRADE PYELOGRAM;  Surgeon: Girish Jack MD;  Location: [facility] OR    CYSTOSCOPY, TRANSURETHRAL RESECTION (TUR) TUMOR BLADDER, COMBINED N/A 3/18/2021    Procedure: CYSTOSCOPY, WITH TRANSURETHRAL RESECTION BLADDER TUMOR;  Surgeon: Girish Jack MD;  Location: [facility] OR    ENDARTERECTOMY CAROTID Left 10/8/2020    Procedure: LEFT CAROTID ENDARTERECTOMY WITH EEG;  Surgeon: Lacho Jasmine MD;  Location:  OR    ESOPHAGOSCOPY, GASTROSCOPY, DUODENOSCOPY (EGD), COMBINED N/A 6/20/2022    Procedure: ESOPHAGOGASTRODUODENOSCOPY, WITH BIOPSY;  Surgeon: Ramses Arenas MD;  Location:  GI    EXCISE MASS AXILLA Left 9/16/2016    Procedure: EXCISE MASS AXILLA;  Surgeon: Keyon Blanco MD;  Location: [facility]    HC REMV CATARACT EXTRACAP,INSERT LENS, W/O ECP  6/25/2009    Right eye    INJECT EPIDURAL LUMBAR N/A 4/11/2019    Procedure: interlaminar epidual steroid injection lumbar 4-5;  Surgeon: Oskar Salvador MD;  Location: [facility]    INJECT EPIDURAL LUMBAR Bilateral 9/12/2019    Procedure: lumbar 4-5 epidural interlaminar steroid injection;  Surgeon: Oskar Salvador MD;   Location: [facility]    INSERT PORT VASCULAR ACCESS Right 10/7/2016    Procedure: INSERT PORT VASCULAR ACCESS;  Surgeon: Keyon Blanco MD;  Location: [facility]    IR CHEST PORT PLACEMENT > 5 YRS OF AGE  4/16/2021    MASTECTOMY SIMPLE BILATERAL Bilateral 2/8/2017    Procedure: MASTECTOMY SIMPLE BILATERAL;  Surgeon: Keyon Blanco MD;  Location: [facility]    OPEN REDUCTION INTERNAL FIXATION FOOT Right 3/20/2015    Procedure: OPEN REDUCTION INTERNAL FIXATION FOOT;  Surgeon: Javi Herrmann DPM;  Location: [facility] OR    OPTICAL TRACKING SYSTEM FUSION SPINE POSTERIOR LUMBAR TWO LEVELS N/A 2/4/2020    Procedure: LUMBAR 2-SACRAL1 TRANSFORMINAL INTERBODY FUSION;  Surgeon: Lenny Gomes MD;  Location: [facility] OR    REMOVE PORT VASCULAR ACCESS Right 2/14/2018    Procedure: REMOVE PORT VASCULAR ACCESS;  right intra jugular port removal;  Surgeon: Keyon Blanco MD;  Location: [facility]    SHOULDER SURGERY Right 11/2015    ZZC LIGATE FALLOPIAN TUBE      ZZC NONSPECIFIC PROCEDURE  1956    ankle fracture surgery     Family History   Problem Relation Age of Onset    Hypertension Mother     Thyroid Disease Mother     Cerebrovascular Disease Mother     Hypertension Father     Cerebrovascular Disease Father     Cancer Father         skin    Thyroid Disease Sister     Diabetes Brother         type 2    Depression Sister     Breast Cancer Sister         age 47    Cancer Sister         skin    Cancer Brother         inside nose            Allergies   Oxybutynin      This note was completed in part using dictation via the Dragon voice recognition software. Some word and grammatical errors may occur and must be interpreted in the appropriate clinical context.  If there are any questions pertaining to this issue, please contact me for further clarification.

## 2024-09-05 ENCOUNTER — TELEPHONE (OUTPATIENT)
Dept: ADMISSION | Facility: CLINIC | Age: 74
End: 2024-09-05
Payer: COMMERCIAL

## 2024-09-05 DIAGNOSIS — I10 BENIGN ESSENTIAL HYPERTENSION: ICD-10-CM

## 2024-09-05 NOTE — TELEPHONE ENCOUNTER
Called patient she stated that last night blood pressure was 103/64 so patient did not take any meds, This morning after coreg 124/68 and then at 1pm 145/87. Reviewed with patient that blood pressure will fluctuate a bit through out the day and we are looking for trends of high blood pressure not just one reading. Patient's pharmacy sent out amlodipine via mail so she won't get it for a few days. Patient will contact us if she has any further questions and will keep track of blood pressure and let us know if she continues to get readings of systolic great than 140.

## 2024-09-05 NOTE — TELEPHONE ENCOUNTER
Reason for Call:  calling regarding blood pressure and medication amlodipine    Detailed comments: Anais told her to call if blood pressure went above 140.  It is 145/87.  Also her Amlodipine that was prescribed was mailed.  Should she still take Imdur until she gets it?    Phone Number Patient can be reached at: Cell number on file:    Telephone Information:   Mobile [phone]       Best Time: anytime    Can we leave a detailed message on this number? YES    Call taken on 9/5/2024 at 2:43 PM by Janice Gaffney

## 2024-09-06 DIAGNOSIS — M48.061 FORAMINAL STENOSIS OF LUMBAR REGION: ICD-10-CM

## 2024-09-06 DIAGNOSIS — M54.16 LUMBAR RADICULOPATHY: ICD-10-CM

## 2024-09-06 DIAGNOSIS — M51.369 DDD (DEGENERATIVE DISC DISEASE), LUMBAR: ICD-10-CM

## 2024-09-06 DIAGNOSIS — G89.4 CHRONIC PAIN SYNDROME: ICD-10-CM

## 2024-09-06 RX ORDER — OXYCODONE HYDROCHLORIDE 5 MG/1
5 TABLET ORAL EVERY 6 HOURS PRN
Qty: 45 TABLET | Refills: 0 | Status: SHIPPED | OUTPATIENT
Start: 2024-09-06

## 2024-09-09 ENCOUNTER — HOSPITAL ENCOUNTER (OUTPATIENT)
Dept: CARDIAC REHAB | Facility: CLINIC | Age: 74
Discharge: HOME | End: 2024-09-09
Attending: INTERNAL MEDICINE
Payer: MEDICARE

## 2024-09-09 ENCOUNTER — MYC MEDICAL ADVICE (OUTPATIENT)
Dept: CARDIOLOGY | Facility: CLINIC | Age: 74
End: 2024-09-09
Payer: COMMERCIAL

## 2024-09-09 PROCEDURE — 93798 PHYS/QHP OP CAR RHAB W/ECG: CPT

## 2024-09-09 NOTE — TELEPHONE ENCOUNTER
Sending KINA Marks patient's [employer] message update.       KINA Marks LOV note 9/4/24:  Michaela Dorman is a very pleasant 73 year old female who is here today for follow up on blood pressure      Coronary artery disease  -Coronary angiogram 4/2024- PCI with AMBREEN x2 to the CHLOE  -Coronary angiogram 6/2024-  residual moderate stenosis of the LCx and stenosis distal to the previously placed stents was evaluated with iFR which was not hemodynamically significant (0.96). The mid LAD lesion was negative by iFR (0.94).    DAPT: Aspirin 81 mg daily, clopidogrel 75 mg daily  Statin: Atorvastatin 80 mg daily  BB: Coreg 3.125 mg twice daily  ACE/ARB: None   Cardiac rehab: Attending     Labile blood pressure with intermittent lightheadedness and chest discomfort  -Most recent stress test done 8/19/2024 was negative for ischemia.  Patient had normal resting wall motion and no stress-induced wall motion abnormality.  LVEF was 55 to 60%.    -Blood pressure has been managed with Coreg 3.125 mg twice daily.  Chest discomfort has been managed with Imdur 15 mg twice daily.    -Recently, patient has been experiencing episodes of lightheadedness so she has been holding either the Imdur or the Coreg.  Yesterday, patient only took her Imdur in the morning and had an episode of lightheadedness and had to lay down for 30 minutes. Upon reviewing her blood pressure log, patients blood pressure was normotensive when she became lightheaded. It is possible lightheadedness could be secondary to Imdur.  Will discontinue the Imdur and start amlodipine 5 mg daily.  Will continue Coreg 3.125 mg twice daily.    -Encouraged patient to continue to monitor her blood pressure and symptoms.  Could increase or decrease amlodipine depending on her symptoms and blood pressure readings moving forward.     HLD  -Lipid profile 4/2024-  and LDL 85  -Managed with atorvastatin 80 mg daily      History of bladder cancer   CKD   Carotid artery  disease s/p CEA 3/2021    Follow up with MARCELINO Ty on September 25th      Anais Payne PA-C

## 2024-09-09 NOTE — TELEPHONE ENCOUNTER
Thanks for the update. Okay to continue with amlodipine 5 mg daily. Okay to hold Coreg if blood pressures on amlodipine remain within normal limits. Can further discuss medication regimen and blood pressure regimen at OV on 9/25.     Anais Pulido message sent to patient.

## 2024-09-11 ENCOUNTER — HOSPITAL ENCOUNTER (OUTPATIENT)
Dept: CARDIAC REHAB | Facility: CLINIC | Age: 74
Discharge: HOME | End: 2024-09-11
Attending: INTERNAL MEDICINE
Payer: MEDICARE

## 2024-09-11 PROCEDURE — 93798 PHYS/QHP OP CAR RHAB W/ECG: CPT

## 2024-09-13 ENCOUNTER — HOSPITAL ENCOUNTER (OUTPATIENT)
Dept: CARDIAC REHAB | Facility: CLINIC | Age: 74
Discharge: HOME | End: 2024-09-13
Attending: INTERNAL MEDICINE
Payer: MEDICARE

## 2024-09-13 PROCEDURE — 93798 PHYS/QHP OP CAR RHAB W/ECG: CPT

## 2024-09-16 ENCOUNTER — HOSPITAL ENCOUNTER (OUTPATIENT)
Dept: CARDIAC REHAB | Facility: CLINIC | Age: 74
Discharge: HOME | End: 2024-09-16
Attending: INTERNAL MEDICINE
Payer: MEDICARE

## 2024-09-16 PROCEDURE — 93798 PHYS/QHP OP CAR RHAB W/ECG: CPT

## 2024-09-18 ENCOUNTER — HOSPITAL ENCOUNTER (OUTPATIENT)
Dept: CARDIAC REHAB | Facility: CLINIC | Age: 74
Discharge: HOME | End: 2024-09-18
Attending: INTERNAL MEDICINE
Payer: MEDICARE

## 2024-09-18 PROCEDURE — 93798 PHYS/QHP OP CAR RHAB W/ECG: CPT

## 2024-09-23 DIAGNOSIS — E03.4 HYPOTHYROIDISM DUE TO ACQUIRED ATROPHY OF THYROID: ICD-10-CM

## 2024-09-23 RX ORDER — LEVOTHYROXINE SODIUM 25 UG/1
25 TABLET ORAL DAILY
Qty: 90 TABLET | Refills: 1 | OUTPATIENT
Start: 2024-09-23

## 2024-09-24 ENCOUNTER — PATIENT OUTREACH (OUTPATIENT)
Dept: ONCOLOGY | Facility: CLINIC | Age: 74
End: 2024-09-24
Payer: COMMERCIAL

## 2024-09-24 NOTE — TELEPHONE ENCOUNTER
[facility]: Cancer Care Follow-Up Note                                    Discussion with Patient:                                                      Chart review, enrolled into [facility] per RNCC monitoring.     Assessment:                                                      RNCC Short Symptom Review:     Assessment completed with:: VM-chart review    General/Short Assessment  Does the patient have all their medications?: Yes  Discussion with patient: Reviewed how and when to contact clinic;Reviewed patient's future appointments    Clinic Utilization  Patient Aware of Next Appointment?: Yes    Other Patient Concerns  Other Patient Reported Concerns: Yes, see notes    Intervention/Education provided during outreach:                                                       RTC 6 months w/ labs (CBC, CMP, TSH) and surveillance CT CAP     Patient to follow up as scheduled at next appt  Patient to call/[employer] message with updates    Signature:  Noemi Garcia RN

## 2024-09-25 ENCOUNTER — VIRTUAL VISIT (OUTPATIENT)
Dept: CARDIOLOGY | Facility: CLINIC | Age: 74
End: 2024-09-25
Payer: COMMERCIAL

## 2024-09-25 DIAGNOSIS — I25.10 CORONARY ARTERY DISEASE INVOLVING NATIVE CORONARY ARTERY OF NATIVE HEART WITHOUT ANGINA PECTORIS: Primary | ICD-10-CM

## 2024-09-25 RX ORDER — ISOSORBIDE MONONITRATE 30 MG/1
15 TABLET, EXTENDED RELEASE ORAL DAILY
Qty: 15 TABLET | Refills: 11 | Status: SHIPPED | OUTPATIENT
Start: 2024-09-25 | End: 2024-09-26

## 2024-09-25 NOTE — PROGRESS NOTES
Lissa is a 73 year old who is being evaluated via a billable video visit.    How would you like to obtain your AVS? MyChart  If the video visit is dropped, the invitation should be resent by: Text to cell phone: [phone]  Will anyone else be joining your video visit? No      Review Of Systems  Respiratory: Shortness of breath - at rest and with exertion  Cardiovascular: Chest pain - mild and once in awhile    BP before medication this mornin/73 mm Hg  Vitals - Patient Reported  Systolic (Patient Reported): 117  Diastolic (Patient Reported): 67  Pulse (Patient Reported): 85    Hetal Hernandez  Video-Visit Details    Type of service:  Video Visit   Video End Time: 11:33  Originating Location (pt. Location): Home    Distant Location (provider location):  On-site  Platform used for Video Visit: Vee

## 2024-09-25 NOTE — LETTER
9/25/2024    Phani Albright PA-C  [address] [facility] [address]    RE: Michaela Dorman       Dear Colleague,     I had the pleasure of seeing Michaela Dorman in the [facility].      Cardiology Virtual Visit Progress Note    Service Date: September 24, 2024  Primary Cardiologist: Dr. Choi  Reason for visit: 3 week follow up     Ms. Michaela Dorman is a 73 year old female who is being evaluated via a billable virtual visit.    Patient has given verbal consent for virtual visit?  Yes    History of Present Illness    she is a very pleasant 73 year old female with a past medical history of:  Coronary artery disease  Coronary angiogram 4/2024- PCI with AMBREEN x2 to the CHLOE  Coronary angiogram 6/2024- residual moderate stenosis of the LCx and stenosis distal to the previously placed stents was evaluated with iFR which was not hemodynamically significant (0.96). The mid LAD lesion was negative by iFR (0.94).   Hypertension  Hyperlipidemia  History of bladder cancer  Carotid artery disease    I had the opportunity to see Ms. Dorman for cardiology follow-up via virtual visit. In review, Ms. Dorman has been experiencing labile blood pressures with recent dose adjustments in her isosorbide. She was briefly off her isosorbide for systolic blood pressures in the 60s but unfortunately developed anginal symptoms and was restarted at a lower dose. She continued to have anginal symptoms at the reduced dose and subsequently required ED evaluation for a syncopal episode after taking 2 nitroglycerin. At her last cardiology visit her isosorbide was discontinued and she was started on amlodipine 5 mg.    Today, she reports continued chest pressure that wakes her up in the morning. She is able to go days without any chest pressure. The pain is not associated with shortness of breath, nausea, or diaphoresis. She participates in cardiac rehab 3 days per week in addition to exercising at the gym 3 days per week  and is asymptomatic with this. Denies any lightheadedness or dizziness, syncope. No recent changes in health or recent illness.    Blood pressure at home runs in the 110s/70s    Diagnostics:  Exercise stress echo 8/19/2024:  A treadmill exercise test according to the Sanya protocol was performed.  The patient exercised 6:30.  The patient had 2/10 chest pain at rest that did not change with exertion.  The EKG portion of this stress test was negative for inducible ischemia (see  echo results below).  Normal resting wall motion and no stress-induced wall motion abnormality.     This was a normal stress echocardiogram with no evidence of stress-induced  ischemia.  Echocardiogram 6/13/2024:  See stress echo result 4/17/2024  There is borderline concentric left ventricular hypertrophy.  Left ventricular systolic function is normal.  Normal left ventricular wall motion  Coronary angiogram 6/13/2024:  Nonobstructive CAD.  Patent stents in the LCx.  Residual moderate stenosis in LCx and stenosis distal to the previously placed stents was evaluated with iFR which was not hemodynamically significant at 0.96.  Moderate LAD stenosis evalauted with iFR and the LAD stenosis was not hemodynamically significant.  iFR 0.94.  LVEDP relatively low, 0.94.      __________________________________________________________________         Assessment and Impression:     Coronary artery disease  Echocardiogram 6/2024: LVEF 55-60% with grade 1 diastolic dysfunction, normal LV wall motion  Continues to have chest pressure symptoms. These are normally in the morning and are not brought on with exertion (cardiac rehab or working out at the gym). Not associated with other symptoms like shortness of breath, diaphoresis. Slight improvement since starting amlodipine.  Hypertension with labile pressures  Blood pressure stable with initiation of amlodipine and discontinuation of Imdur.   Amlodipine 5 mg started 9/4. Carvedilol 3.125 mg twice  daily  Hyperlipidemia  History of bladder cancer  Chronic kidney disease  Carotid artery disease s/p CEA 3/2021         Recommendations and Plan:     Continuing to have some typical and atypical anginal symptoms with modest improvement since stopping twice daily Imdur and starting amlodipine. Blood pressures at home have been stable in the 110s without any further episodes of lightheadedness or dizziness. Will trial adding back in 15 mg of Imdur in the morning. I discussed with patient to watch for signs of hypotension and if she feels lightheaded or dizzy I want her to stop the Imdur. Reassuringly, recent stress echo without evidence of ischemia on 8/19/2024 and coronary angiogram from 6/13/2024 showed non-obstructive CAD.  Follow up with myself of [facility] in 4-6 weeks to assess symptoms  __________________________________________________________________    Thank you for the opportunity to participate in this pleasant patient's care.   We would be happy to see her sooner if needed for any concerns in the meantime.     Melony GONZALEZ CNP   Nurse Practitioner  [facility]  Pager: [phone]   Text Page (8am - 5pm, M-F)    Provider location: [facility]  Patient location: Home  Total time on phone call: 15 min 34 seconds    A total of 30 minutes was spent with patient, on chart review and documentation today.       Orders this Visit:  Orders Placed This Encounter   Procedures     Follow-Up with Cardiology [facility]     Orders Placed This Encounter   Medications     isosorbide mononitrate (IMDUR) 30 MG 24 hr tablet     Sig: Take 0.5 tablets (15 mg) by mouth daily.     Dispense:  15 tablet     Refill:  11     There are no discontinued medications.  Encounter Diagnosis   Name Primary?     Coronary artery disease involving native coronary artery of native heart without angina pectoris Yes       CURRENT MEDICATIONS:  Current Outpatient Medications   Medication Sig Dispense Refill     acetaminophen  (TYLENOL) 500 MG tablet Take 1,000 mg by mouth 3 times daily as needed for mild pain (500MG X 2 = 1,000MG)       amLODIPine (NORVASC) 5 MG tablet Take 1 tablet (5 mg) by mouth daily. 30 tablet 1     aspirin (ASA) 81 MG chewable tablet Take 1 tablet (81 mg) by mouth daily 100 tablet 3     atorvastatin (LIPITOR) 80 MG tablet Take 80 mg by mouth daily       carvedilol (COREG) 3.125 MG tablet Take 1 tablet (3.125 mg) by mouth 2 times daily (with meals) 180 tablet 3     clopidogrel (PLAVIX) 75 MG tablet Take 1 tablet (75 mg) by mouth daily 90 tablet 3     Cyanocobalamin (B-12 PO) Take 1 tablet by mouth daily       escitalopram (LEXAPRO) 10 MG tablet Take 1 tablet (10 mg) by mouth daily 30 tablet 0     ezetimibe (ZETIA) 10 MG tablet Take 1 tablet (10 mg) by mouth daily 90 tablet 3     gabapentin (NEURONTIN) 300 MG capsule Take 2 capsules (600 mg) by mouth 3 times daily 180 capsule 5     isosorbide mononitrate (IMDUR) 30 MG 24 hr tablet Take 0.5 tablets (15 mg) by mouth daily. 15 tablet 11     levothyroxine (SYNTHROID/LEVOTHROID) 25 MCG tablet Take 1 tablet (25 mcg) by mouth daily 90 tablet 1     LORazepam (ATIVAN) 0.5 MG tablet TAKE 1 TABLET (0.5 MG) BY MOUTH EVERY 4 HOURS AS NEEDED FOR ANXIETY, NAUSEA OR SLEEP 15 tablet 0     oxyCODONE (ROXICODONE) 5 MG tablet TAKE 1 TABLET (5 MG) BY MOUTH EVERY 6 HOURS AS NEEDED FOR MODERATE TO SEVERE PAIN 45 tablet 0     pantoprazole (PROTONIX) 40 MG EC tablet Take 1 tablet (40 mg) by mouth daily 90 tablet 3     prochlorperazine (COMPAZINE) 10 MG tablet TAKE 1/2 TABLET BY MOUTH EVERY 6 HOURS AS NEEDED FORNAUSEA/VOMITING (Patient taking differently: Take 5 mg by mouth every 6 hours as needed for nausea or vomiting. Take 1/2 tablet (5 mg) every 6 hours as needed for nausea or vomiting) 30 tablet 2     senna-docusate (SENEXON-S) 8.6-50 MG tablet Take 2 tablets by mouth at bedtime 100 tablet 0     nitroGLYcerin (NITROSTAT) 0.4 MG sublingual tablet For chest pain place 1 tablet under the  tongue every 5 minutes for 3 doses. If symptoms persist 5 minutes after 2nd dose call 911. (Patient not taking: Reported on 2024) 30 tablet 0       ALLERGIES  Allergies   Allergen Reactions     Oxybutynin      Patient reports symptoms of nausea and mind fogginess       PAST MEDICAL, SURGICAL, FAMILY HISTORY:  History was reviewed and updated as needed, see medical record.    SOCIAL HISTORY:  Social History     Socioeconomic History     Marital status:      Spouse name: ozzie     Number of children: 5     Years of education: Not on file     Highest education level: Not on file   Occupational History     Employer: [employer]   Tobacco Use     Smoking status: Former     Current packs/day: 0.00     Average packs/day: 3.0 packs/day for 10.0 years (30.0 ttl pk-yrs)     Types: Cigarettes     Start date: 1969     Quit date: 1979     Years since quittin.8     Passive exposure: Never     Smokeless tobacco: Never     Tobacco comments:     approx. 3 packs daily   Vaping Use     Vaping status: Never Used   Substance and Sexual Activity     Alcohol use: Yes     Comment: once in awhile     Drug use: No     Sexual activity: Yes     Partners: Male   Other Topics Concern      Service Not Asked     Blood Transfusions Not Asked     Caffeine Concern No     Occupational Exposure Not Asked     Hobby Hazards No     Sleep Concern No     Stress Concern Yes     Weight Concern Not Asked     Special Diet Not Asked     Back Care Not Asked     Exercise Yes     Bike Helmet Not Asked     Seat Belt Yes     Self-Exams Not Asked     Parent/sibling w/ CABG, MI or angioplasty before 65F 55M? Not Asked   Social History Narrative     Not on file     Social Determinants of Health     Financial Resource Strain: Low Risk  (2024)    Financial Resource Strain      Within the past 12 months, have you or your family members you live with been unable to get utilities (heat, electricity) when it was really needed?: No   Food  Insecurity: Low Risk  (4/25/2024)    Food Insecurity      Within the past 12 months, did you worry that your food would run out before you got money to buy more?: No      Within the past 12 months, did the food you bought just not last and you didn t have money to get more?: No   Transportation Needs: Low Risk  (4/25/2024)    Transportation Needs      Within the past 12 months, has lack of transportation kept you from medical appointments, getting your medicines, non-medical meetings or appointments, work, or from getting things that you need?: No   Physical Activity: Sufficiently Active (4/25/2024)    Exercise Vital Sign      Days of Exercise per Week: 5 days      Minutes of Exercise per Session: 100 min   Stress: Stress Concern Present (4/25/2024)    [employer] [employer] - Occupational Stress Questionnaire      Feeling of Stress : To some extent   Social Connections: Unknown (4/25/2024)    Social Connection and Isolation Panel [NHANES]      Frequency of Communication with Friends and Family: Not on file      Frequency of Social Gatherings with Friends and Family: Once a week      Attends Orthodoxy Services: Not on file      Active Member of Clubs or Organizations: Not on file      Attends Club or Organization Meetings: Not on file      Marital Status: Not on file   Interpersonal Safety: Low Risk  (4/25/2024)    Interpersonal Safety      Do you feel physically and emotionally safe where you currently live?: Yes      Within the past 12 months, have you been hit, slapped, kicked or otherwise physically hurt by someone?: No      Within the past 12 months, have you been humiliated or emotionally abused in other ways by your partner or ex-partner?: No   Housing Stability: Low Risk  (4/25/2024)    Housing Stability      Do you have housing? : Yes      Are you worried about losing your housing?: No       Review of Systems:  Skin: negative  Eyes: negative  Ears/Nose/Throat: negative  Respiratory: No  shortness of breath, dyspnea on exertion, cough, or hemoptysis  Cardiovascular: positive for chest pain  Gastrointestinal: negative  Genitourinary: negative  Musculoskeletal: negative  Neurologic: negative  Psychiatric: negative  Hematologic/Lymphatic/Immunologic: negative  Endocrine: negative    Patient Reported Vitals:   BP: SBP at home runs 110s  Pulse: 70s  Weight: --    PSYCH: Alert and oriented times 3; coherent speech, normal   rate and volume, able to articulate logical thoughts, able   to abstract reason, no tangential thoughts, no hallucinations   or delusions. her affect is normal and pleasant  RESP: No cough, no audible wheezing, able to talk in full sentences    Remainder of the comprehensive physical exam is unable to be completed due to today's visit being completed via telephone call.    There were no vitals taken for this visit.   Wt Readings from Last 4 Encounters:   09/04/24 66.2 kg (146 lb)   08/18/24 66.7 kg (147 lb)   08/05/24 64.9 kg (143 lb)   07/09/24 64.9 kg (143 lb)     Recent Lab Results:  LIPID RESULTS:  Lab Results   Component Value Date    CHOL 194 04/29/2024    CHOL 199 10/08/2020    HDL 88 04/29/2024    HDL 76 10/08/2020    LDL 85 04/29/2024    LDL 87 10/08/2020    TRIG 106 04/29/2024    TRIG 180 (H) 10/08/2020    CHOLHDLRATIO 3.0 08/02/2010     LIVER ENZYME RESULTS:  Lab Results   Component Value Date    AST 26 08/18/2024    AST 20 05/21/2021    ALT 20 08/18/2024    ALT 38 05/21/2021     CBC RESULTS:  Lab Results   Component Value Date    WBC 8.0 08/18/2024    WBC 13.5 (H) 06/24/2021    RBC 3.39 (L) 08/18/2024    RBC 2.99 (L) 06/24/2021    HGB 11.3 (L) 08/18/2024    HGB 9.6 (L) 06/24/2021    HCT 34.5 (L) 08/18/2024    HCT 30.0 (L) 06/24/2021     (H) 08/18/2024     06/24/2021    MCH 33.3 (H) 08/18/2024    MCH 32.1 06/24/2021    MCHC 32.8 08/18/2024    MCHC 32.0 06/24/2021    RDW 14.1 08/18/2024    RDW 14.8 06/24/2021     08/18/2024     06/24/2021     BMP  RESULTS:  Lab Results   Component Value Date     2024     2021    POTASSIUM 4.4 2024    POTASSIUM 4.8 2022    POTASSIUM 4.7 2021    CHLORIDE 105 2024    CHLORIDE 108 2022    CHLORIDE 106 2021    CO2 20 (L) 2024    CO2 26 2022    CO2 24 2021    ANIONGAP 16 (H) 2024    ANIONGAP 4 2022    ANIONGAP 6 2021    GLC 84 2024    GLC 93 2024    GLC 90 2022     (H) 2021    BUN 28.4 (H) 2024    BUN 28 2022    BUN 30 2021    CR 1.15 (H) 2024    CR 1.10 (H) 2021    GFRESTIMATED 50 (L) 2024    GFRESTIMATED 48 (L) 2023    GFRESTIMATED 50 (L) 2021    GFRESTBLACK 58 (L) 2021    VANDANA 10.1 2024    VANDANA 9.6 2021      A1C RESULTS:  Lab Results   Component Value Date    A1C 5.4 2023    A1C 5.2 10/08/2020     INR RESULTS:  Lab Results   Component Value Date    INR 0.96 2024    INR 1.02 2024    INR 0.91 2021    INR 0.9 10/01/2020    INR 0.88 2018       CC  No referring provider defined for this encounter.    This note was completed in part using Dragon voice recognition software. Although reviewed after completion, some word and grammatical errors may occur.       Lissa is a 73 year old who is being evaluated via a billable video visit.    How would you like to obtain your AVS? MyChart  If the video visit is dropped, the invitation should be resent by: Text to cell phone: [phone]  Will anyone else be joining your video visit? No      Review Of Systems  Respiratory: Shortness of breath - at rest and with exertion  Cardiovascular: Chest pain - mild and once in awhile    BP before medication this mornin/73 mm Hg  Vitals - Patient Reported  Systolic (Patient Reported): 117  Diastolic (Patient Reported): 67  Pulse (Patient Reported): 85    eHtal Hernandez  Video-Visit Details    Type of service:  Video Visit   Video End  Time: 11:33  Originating Location (pt. Location): Home    Distant Location (provider location):  On-site  Platform used for Video Visit: Vee      Thank you for allowing me to participate in the care of your patient.      Sincerely,     LISA Hurst [facility]  cc:   No referring provider defined for this encounter.

## 2024-09-25 NOTE — PROGRESS NOTES
Cardiology Virtual Visit Progress Note    Service Date: September 24, 2024  Primary Cardiologist: Dr. Choi  Reason for visit: 3 week follow up     Ms. Michaela Dorman is a 73 year old female who is being evaluated via a billable virtual visit.    Patient has given verbal consent for virtual visit?  Yes    History of Present Illness    she is a very pleasant 73 year old female with a past medical history of:  Coronary artery disease  Coronary angiogram 4/2024- PCI with AMBREEN x2 to the CHLOE  Coronary angiogram 6/2024- residual moderate stenosis of the LCx and stenosis distal to the previously placed stents was evaluated with iFR which was not hemodynamically significant (0.96). The mid LAD lesion was negative by iFR (0.94).   Hypertension  Hyperlipidemia  History of bladder cancer  Carotid artery disease    I had the opportunity to see Ms. Dorman for cardiology follow-up via virtual visit. In review, Ms. Dorman has been experiencing labile blood pressures with recent dose adjustments in her isosorbide. She was briefly off her isosorbide for systolic blood pressures in the 60s but unfortunately developed anginal symptoms and was restarted at a lower dose. She continued to have anginal symptoms at the reduced dose and subsequently required ED evaluation for a syncopal episode after taking 2 nitroglycerin. At her last cardiology visit her isosorbide was discontinued and she was started on amlodipine 5 mg.    Today, she reports continued chest pressure that wakes her up in the morning. She is able to go days without any chest pressure. The pain is not associated with shortness of breath, nausea, or diaphoresis. She participates in cardiac rehab 3 days per week in addition to exercising at the gym 3 days per week and is asymptomatic with this. Denies any lightheadedness or dizziness, syncope. No recent changes in health or recent illness.    Blood pressure at home runs in the 110s/70s    Diagnostics:  Exercise  stress echo 8/19/2024:  A treadmill exercise test according to the [facility] protocol was performed.  The patient exercised 6:30.  The patient had 2/10 chest pain at rest that did not change with exertion.  The EKG portion of this stress test was negative for inducible ischemia (see  echo results below).  Normal resting wall motion and no stress-induced wall motion abnormality.     This was a normal stress echocardiogram with no evidence of stress-induced  ischemia.  Echocardiogram 6/13/2024:  See stress echo result 4/17/2024  There is borderline concentric left ventricular hypertrophy.  Left ventricular systolic function is normal.  Normal left ventricular wall motion  Coronary angiogram 6/13/2024:  Nonobstructive CAD.  Patent stents in the LCx.  Residual moderate stenosis in LCx and stenosis distal to the previously placed stents was evaluated with iFR which was not hemodynamically significant at 0.96.  Moderate LAD stenosis evalauted with iFR and the LAD stenosis was not hemodynamically significant.  iFR 0.94.  LVEDP relatively low, 0.94.      __________________________________________________________________         Assessment and Impression:     Coronary artery disease  Echocardiogram 6/2024: LVEF 55-60% with grade 1 diastolic dysfunction, normal LV wall motion  Continues to have chest pressure symptoms. These are normally in the morning and are not brought on with exertion (cardiac rehab or working out at the gym). Not associated with other symptoms like shortness of breath, diaphoresis. Slight improvement since starting amlodipine.  Hypertension with labile pressures  Blood pressure stable with initiation of amlodipine and discontinuation of Imdur.   Amlodipine 5 mg started 9/4. Carvedilol 3.125 mg twice daily  Hyperlipidemia  History of bladder cancer  Chronic kidney disease  Carotid artery disease s/p CEA 3/2021         Recommendations and Plan:     Continuing to have some typical and atypical anginal symptoms  with modest improvement since stopping twice daily Imdur and starting amlodipine. Blood pressures at home have been stable in the 110s without any further episodes of lightheadedness or dizziness. Will trial adding back in 15 mg of Imdur in the morning. I discussed with patient to watch for signs of hypotension and if she feels lightheaded or dizzy I want her to stop the Imdur. Reassuringly, recent stress echo without evidence of ischemia on 8/19/2024 and coronary angiogram from 6/13/2024 showed non-obstructive CAD.  Follow up with myself of [facility] in 4-6 weeks to assess symptoms  __________________________________________________________________    Thank you for the opportunity to participate in this pleasant patient's care.   We would be happy to see her sooner if needed for any concerns in the meantime.     Melony GONZALEZ, CNP   Nurse Practitioner  [facility]  Pager: [phone]   Text Page (8am - 5pm, M-F)    Provider location: [facility]  Patient location: Home  Total time on phone call: 15 min 34 seconds    A total of 30 minutes was spent with patient, on chart review and documentation today.       Orders this Visit:  Orders Placed This Encounter   Procedures    Follow-Up with Cardiology [facility]     Orders Placed This Encounter   Medications    isosorbide mononitrate (IMDUR) 30 MG 24 hr tablet     Sig: Take 0.5 tablets (15 mg) by mouth daily.     Dispense:  15 tablet     Refill:  11     There are no discontinued medications.  Encounter Diagnosis   Name Primary?    Coronary artery disease involving native coronary artery of native heart without angina pectoris Yes       CURRENT MEDICATIONS:  Current Outpatient Medications   Medication Sig Dispense Refill    acetaminophen (TYLENOL) 500 MG tablet Take 1,000 mg by mouth 3 times daily as needed for mild pain (500MG X 2 = 1,000MG)      amLODIPine (NORVASC) 5 MG tablet Take 1 tablet (5 mg) by mouth daily. 30 tablet 1    aspirin (ASA) 81  MG chewable tablet Take 1 tablet (81 mg) by mouth daily 100 tablet 3    atorvastatin (LIPITOR) 80 MG tablet Take 80 mg by mouth daily      carvedilol (COREG) 3.125 MG tablet Take 1 tablet (3.125 mg) by mouth 2 times daily (with meals) 180 tablet 3    clopidogrel (PLAVIX) 75 MG tablet Take 1 tablet (75 mg) by mouth daily 90 tablet 3    Cyanocobalamin (B-12 PO) Take 1 tablet by mouth daily      escitalopram (LEXAPRO) 10 MG tablet Take 1 tablet (10 mg) by mouth daily 30 tablet 0    ezetimibe (ZETIA) 10 MG tablet Take 1 tablet (10 mg) by mouth daily 90 tablet 3    gabapentin (NEURONTIN) 300 MG capsule Take 2 capsules (600 mg) by mouth 3 times daily 180 capsule 5    isosorbide mononitrate (IMDUR) 30 MG 24 hr tablet Take 0.5 tablets (15 mg) by mouth daily. 15 tablet 11    levothyroxine (SYNTHROID/LEVOTHROID) 25 MCG tablet Take 1 tablet (25 mcg) by mouth daily 90 tablet 1    LORazepam (ATIVAN) 0.5 MG tablet TAKE 1 TABLET (0.5 MG) BY MOUTH EVERY 4 HOURS AS NEEDED FOR ANXIETY, NAUSEA OR SLEEP 15 tablet 0    oxyCODONE (ROXICODONE) 5 MG tablet TAKE 1 TABLET (5 MG) BY MOUTH EVERY 6 HOURS AS NEEDED FOR MODERATE TO SEVERE PAIN 45 tablet 0    pantoprazole (PROTONIX) 40 MG EC tablet Take 1 tablet (40 mg) by mouth daily 90 tablet 3    prochlorperazine (COMPAZINE) 10 MG tablet TAKE 1/2 TABLET BY MOUTH EVERY 6 HOURS AS NEEDED FORNAUSEA/VOMITING (Patient taking differently: Take 5 mg by mouth every 6 hours as needed for nausea or vomiting. Take 1/2 tablet (5 mg) every 6 hours as needed for nausea or vomiting) 30 tablet 2    senna-docusate (SENEXON-S) 8.6-50 MG tablet Take 2 tablets by mouth at bedtime 100 tablet 0    nitroGLYcerin (NITROSTAT) 0.4 MG sublingual tablet For chest pain place 1 tablet under the tongue every 5 minutes for 3 doses. If symptoms persist 5 minutes after 2nd dose call 911. (Patient not taking: Reported on 9/4/2024) 30 tablet 0       ALLERGIES  Allergies   Allergen Reactions    Oxybutynin      Patient reports  symptoms of nausea and mind fogginess       PAST MEDICAL, SURGICAL, FAMILY HISTORY:  History was reviewed and updated as needed, see medical record.    SOCIAL HISTORY:  Social History     Socioeconomic History    Marital status:      Spouse name: ozzie    Number of children: 5    Years of education: Not on file    Highest education level: Not on file   Occupational History     Employer: [employer]   Tobacco Use    Smoking status: Former     Current packs/day: 0.00     Average packs/day: 3.0 packs/day for 10.0 years (30.0 ttl pk-yrs)     Types: Cigarettes     Start date: 1969     Quit date: 1979     Years since quittin.8     Passive exposure: Never    Smokeless tobacco: Never    Tobacco comments:     approx. 3 packs daily   Vaping Use    Vaping status: Never Used   Substance and Sexual Activity    Alcohol use: Yes     Comment: once in awhile    Drug use: No    Sexual activity: Yes     Partners: Male   Other Topics Concern     Service Not Asked    Blood Transfusions Not Asked    Caffeine Concern No    Occupational Exposure Not Asked    Hobby Hazards No    Sleep Concern No    Stress Concern Yes    Weight Concern Not Asked    Special Diet Not Asked    Back Care Not Asked    Exercise Yes    Bike Helmet Not Asked    Seat Belt Yes    Self-Exams Not Asked    Parent/sibling w/ CABG, MI or angioplasty before 65F 55M? Not Asked   Social History Narrative    Not on file     Social Determinants of Health     Financial Resource Strain: Low Risk  (2024)    Financial Resource Strain     Within the past 12 months, have you or your family members you live with been unable to get utilities (heat, electricity) when it was really needed?: No   Food Insecurity: Low Risk  (2024)    Food Insecurity     Within the past 12 months, did you worry that your food would run out before you got money to buy more?: No     Within the past 12 months, did the food you bought just not last and you didn t have money  to get more?: No   Transportation Needs: Low Risk  (4/25/2024)    Transportation Needs     Within the past 12 months, has lack of transportation kept you from medical appointments, getting your medicines, non-medical meetings or appointments, work, or from getting things that you need?: No   Physical Activity: Sufficiently Active (4/25/2024)    Exercise Vital Sign     Days of Exercise per Week: 5 days     Minutes of Exercise per Session: 100 min   Stress: Stress Concern Present (4/25/2024)    [employer] - Occupational Stress Questionnaire     Feeling of Stress : To some extent   Social Connections: Unknown (4/25/2024)    Social Connection and Isolation Panel [NHANES]     Frequency of Communication with Friends and Family: Not on file     Frequency of Social Gatherings with Friends and Family: Once a week     Attends Rastafarian Services: Not on file     Active Member of Clubs or Organizations: Not on file     Attends Club or Organization Meetings: Not on file     Marital Status: Not on file   Interpersonal Safety: Low Risk  (4/25/2024)    Interpersonal Safety     Do you feel physically and emotionally safe where you currently live?: Yes     Within the past 12 months, have you been hit, slapped, kicked or otherwise physically hurt by someone?: No     Within the past 12 months, have you been humiliated or emotionally abused in other ways by your partner or ex-partner?: No   Housing Stability: Low Risk  (4/25/2024)    Housing Stability     Do you have housing? : Yes     Are you worried about losing your housing?: No       Review of Systems:  Skin: negative  Eyes: negative  Ears/Nose/Throat: negative  Respiratory: No shortness of breath, dyspnea on exertion, cough, or hemoptysis  Cardiovascular: positive for chest pain  Gastrointestinal: negative  Genitourinary: negative  Musculoskeletal: negative  Neurologic: negative  Psychiatric: negative  Hematologic/Lymphatic/Immunologic:  negative  Endocrine: negative    Patient Reported Vitals:   BP: SBP at home runs 110s  Pulse: 70s  Weight: --    PSYCH: Alert and oriented times 3; coherent speech, normal   rate and volume, able to articulate logical thoughts, able   to abstract reason, no tangential thoughts, no hallucinations   or delusions. her affect is normal and pleasant  RESP: No cough, no audible wheezing, able to talk in full sentences    Remainder of the comprehensive physical exam is unable to be completed due to today's visit being completed via telephone call.    There were no vitals taken for this visit.   Wt Readings from Last 4 Encounters:   09/04/24 66.2 kg (146 lb)   08/18/24 66.7 kg (147 lb)   08/05/24 64.9 kg (143 lb)   07/09/24 64.9 kg (143 lb)     Recent Lab Results:  LIPID RESULTS:  Lab Results   Component Value Date    CHOL 194 04/29/2024    CHOL 199 10/08/2020    HDL 88 04/29/2024    HDL 76 10/08/2020    LDL 85 04/29/2024    LDL 87 10/08/2020    TRIG 106 04/29/2024    TRIG 180 (H) 10/08/2020    CHOLHDLRATIO 3.0 08/02/2010     LIVER ENZYME RESULTS:  Lab Results   Component Value Date    AST 26 08/18/2024    AST 20 05/21/2021    ALT 20 08/18/2024    ALT 38 05/21/2021     CBC RESULTS:  Lab Results   Component Value Date    WBC 8.0 08/18/2024    WBC 13.5 (H) 06/24/2021    RBC 3.39 (L) 08/18/2024    RBC 2.99 (L) 06/24/2021    HGB 11.3 (L) 08/18/2024    HGB 9.6 (L) 06/24/2021    HCT 34.5 (L) 08/18/2024    HCT 30.0 (L) 06/24/2021     (H) 08/18/2024     06/24/2021    MCH 33.3 (H) 08/18/2024    MCH 32.1 06/24/2021    MCHC 32.8 08/18/2024    MCHC 32.0 06/24/2021    RDW 14.1 08/18/2024    RDW 14.8 06/24/2021     08/18/2024     06/24/2021     BMP RESULTS:  Lab Results   Component Value Date     08/18/2024     06/24/2021    POTASSIUM 4.4 08/18/2024    POTASSIUM 4.8 11/28/2022    POTASSIUM 4.7 06/24/2021    CHLORIDE 105 08/18/2024    CHLORIDE 108 11/28/2022    CHLORIDE 106 06/24/2021    CO2 20 (L)  08/18/2024    CO2 26 11/28/2022    CO2 24 06/24/2021    ANIONGAP 16 (H) 08/18/2024    ANIONGAP 4 11/28/2022    ANIONGAP 6 06/24/2021    GLC 84 08/18/2024    GLC 93 06/14/2024    GLC 90 11/28/2022     (H) 06/24/2021    BUN 28.4 (H) 08/18/2024    BUN 28 11/28/2022    BUN 30 06/24/2021    CR 1.15 (H) 08/18/2024    CR 1.10 (H) 06/24/2021    GFRESTIMATED 50 (L) 08/18/2024    GFRESTIMATED 48 (L) 03/07/2023    GFRESTIMATED 50 (L) 06/24/2021    GFRESTBLACK 58 (L) 06/24/2021    VANDANA 10.1 08/18/2024    VANDANA 9.6 06/24/2021      A1C RESULTS:  Lab Results   Component Value Date    A1C 5.4 07/26/2023    A1C 5.2 10/08/2020     INR RESULTS:  Lab Results   Component Value Date    INR 0.96 08/18/2024    INR 1.02 06/13/2024    INR 0.91 04/16/2021    INR 0.9 10/01/2020    INR 0.88 02/12/2018       CC  No referring provider defined for this encounter.    This note was completed in part using Dragon voice recognition software. Although reviewed after completion, some word and grammatical errors may occur.

## 2024-09-25 NOTE — PATIENT INSTRUCTIONS
Thank you for your visit with the [facility] today.    Today's Summary:    Continue the amlodipine. Let's try adding in 15 mg of Imdur in the mornings to see if this helps with your symptoms. If you notice new lightheadedness or dizziness then stop the Imdur.   Continue to monitor your blood pressure at home      Follow-up:  Cardiology follow up at [facility]: Follow up in 4-6 weeks to reassess your symptoms.     Cardiology Scheduling ~[phone]  Cardiology Clinic RN ~ [phone]    It was a pleasure seeing you today.     LISA Hurst, CNP  Certified Nurse Practitioner  [facility]  September 25, 2024  ________________________________________________________

## 2024-09-26 ENCOUNTER — MYC MEDICAL ADVICE (OUTPATIENT)
Dept: CARDIOLOGY | Facility: CLINIC | Age: 74
End: 2024-09-26
Payer: COMMERCIAL

## 2024-09-26 DIAGNOSIS — E03.4 HYPOTHYROIDISM DUE TO ACQUIRED ATROPHY OF THYROID: ICD-10-CM

## 2024-09-26 RX ORDER — LEVOTHYROXINE SODIUM 25 UG/1
25 TABLET ORAL DAILY
Qty: 90 TABLET | Refills: 0 | Status: SHIPPED | OUTPATIENT
Start: 2024-09-26

## 2024-09-26 RX ORDER — LEVOTHYROXINE SODIUM 25 UG/1
25 TABLET ORAL DAILY
Qty: 90 TABLET | Refills: 0 | OUTPATIENT
Start: 2024-09-26

## 2024-09-26 NOTE — TELEPHONE ENCOUNTER
Miky Alcaraz, thank you for letting us know. I agree that we should definitely stop the Imdur. Let's continue the amlodipine and monitor how you feel. In the meantime, I'll touch base with Dr. Choi to see if she has any other suggestions or thoughts. Thank you, LISA Hurst CNP

## 2024-10-08 DIAGNOSIS — F43.21 ADJUSTMENT DISORDER WITH DEPRESSED MOOD: ICD-10-CM

## 2024-10-08 RX ORDER — ESCITALOPRAM OXALATE 10 MG/1
10 TABLET ORAL DAILY
Qty: 90 TABLET | Refills: 3 | Status: SHIPPED | OUTPATIENT
Start: 2024-10-08

## 2024-10-10 DIAGNOSIS — I10 BENIGN ESSENTIAL HYPERTENSION: ICD-10-CM

## 2024-10-10 RX ORDER — AMLODIPINE BESYLATE 5 MG/1
5 TABLET ORAL DAILY
Qty: 90 TABLET | Refills: 3 | Status: SHIPPED | OUTPATIENT
Start: 2024-10-10

## 2024-10-14 DIAGNOSIS — F43.21 ADJUSTMENT DISORDER WITH DEPRESSED MOOD: ICD-10-CM

## 2024-10-14 RX ORDER — LORAZEPAM 0.5 MG/1
0.5 TABLET ORAL EVERY 4 HOURS PRN
Qty: 15 TABLET | Refills: 0 | Status: SHIPPED | OUTPATIENT
Start: 2024-10-14 | End: 2024-11-06

## 2024-10-14 RX ORDER — ESCITALOPRAM OXALATE 10 MG/1
10 TABLET ORAL DAILY
Qty: 90 TABLET | Refills: 3 | OUTPATIENT
Start: 2024-10-14

## 2024-10-16 DIAGNOSIS — I25.10 CORONARY ARTERY DISEASE INVOLVING NATIVE CORONARY ARTERY OF NATIVE HEART WITHOUT ANGINA PECTORIS: ICD-10-CM

## 2024-10-16 DIAGNOSIS — R94.39 ABNORMAL CARDIOVASCULAR STRESS TEST: ICD-10-CM

## 2024-10-16 RX ORDER — CLOPIDOGREL BISULFATE 75 MG/1
75 TABLET ORAL DAILY
Qty: 90 TABLET | Refills: 3 | Status: SHIPPED | OUTPATIENT
Start: 2024-10-16

## 2024-10-27 ENCOUNTER — MYC MEDICAL ADVICE (OUTPATIENT)
Dept: CARDIOLOGY | Facility: CLINIC | Age: 74
End: 2024-10-27
Payer: COMMERCIAL

## 2024-10-29 DIAGNOSIS — M54.16 LUMBAR RADICULOPATHY: ICD-10-CM

## 2024-10-29 DIAGNOSIS — M51.369 DDD (DEGENERATIVE DISC DISEASE), LUMBAR: ICD-10-CM

## 2024-10-29 DIAGNOSIS — G89.4 CHRONIC PAIN SYNDROME: ICD-10-CM

## 2024-10-29 DIAGNOSIS — M48.061 FORAMINAL STENOSIS OF LUMBAR REGION: ICD-10-CM

## 2024-10-29 RX ORDER — OXYCODONE HYDROCHLORIDE 5 MG/1
5 TABLET ORAL EVERY 6 HOURS PRN
Qty: 45 TABLET | Refills: 0 | Status: SHIPPED | OUTPATIENT
Start: 2024-10-29

## 2024-10-30 ENCOUNTER — VIRTUAL VISIT (OUTPATIENT)
Dept: CARDIOLOGY | Facility: CLINIC | Age: 74
End: 2024-10-30
Payer: COMMERCIAL

## 2024-10-30 VITALS
HEIGHT: 65 IN | DIASTOLIC BLOOD PRESSURE: 60 MMHG | WEIGHT: 140 LBS | BODY MASS INDEX: 23.32 KG/M2 | SYSTOLIC BLOOD PRESSURE: 120 MMHG

## 2024-10-30 DIAGNOSIS — I25.10 CORONARY ARTERY DISEASE INVOLVING NATIVE CORONARY ARTERY OF NATIVE HEART WITHOUT ANGINA PECTORIS: ICD-10-CM

## 2024-10-30 PROCEDURE — 99214 OFFICE O/P EST MOD 30 MIN: CPT | Mod: 95 | Performed by: PHYSICIAN ASSISTANT

## 2024-10-30 PROCEDURE — G2211 COMPLEX E/M VISIT ADD ON: HCPCS | Mod: 95 | Performed by: PHYSICIAN ASSISTANT

## 2024-10-30 NOTE — PROGRESS NOTES
Lissa is a 74 year old who is being evaluated via a billable video visit.    How would you like to obtain your AVS? [facility]  If the video visit is dropped, the invitation should be resent by: Text to cell phone: [phone]  Will anyone else be joining your video visit? No    Video-Visit Details    Type of service:  Video Visit   Video End Time:1:32 PM  Originating Location (pt. Location): Home    Distant Location (provider location):  On-site  Platform used for Video Visit: Vee

## 2024-10-30 NOTE — PROGRESS NOTES
"  Cardiology Clinic Progress Note    Service Date: 2024    Primary Cardiologist: Dr. Aguirre      Reason for Visit: chest pain, hypotension f/u     HPI:   Michaela Dorman is a delightful 74-year-old woman with past medical history significant for the followin.  Hypertension  2 hyperlipidemia  3 history of bladder CA  4 history of carotid disease with history of carotid endarterectomy  5.  Coronary artery disease with drug-eluting stent to the OM1 for 99% lesion with 60% stenosis of the proximal circumflex at the bifurcation, and 50% mid LAD lesion .  Negative IFR of the circumflex and LAD on repeat angiogram .    Unfortunately, Kristi struggled with chest pain intermittently throughout the summer.  She was started appropriately on Imdur but this caused severe hypotension and a syncopal episode.  She was last seen by Ms. Croft and at that point it was trialed on a tiny dose of Imdur at 15 mg daily.  Unfortunately again her blood pressure dropped and Imdur was again discontinued.  At that point she had been describing chest pain in the mornings most days of the week.    Today, she states that she had her chest pains been gone for several weeks she has not taken nitro in quite some time.  Blood pressures have been good mostly is in the 110s and 120s with the highest being 140 before she takes her morning meds but even that is rare.  She denies syncope or presyncope.  We are doing a virtual visit today as she has been ill with an upper respiratory infection the last several things including fevers or chills but seems to be progressing as expected and improved.  She is hoping to go back to the gym tomorrow.    Physical Exam:  /60   Ht 1.651 m (5' 5\")   Wt 63.5 kg (140 lb)   BMI 23.30 kg/m     Well-developed well-nourished woman in no acute distress.  Appears well.  She is speaking in complete sentences.  She does not appear short of breath there is no obvious retractions.    Data " reviewed:  Coronary angiogram x 2  Lipid panel    Assessment and Plan:  1.  Coronary disease status post drug-eluting stent x 2 to the OM1/24 with negative IFR of several other lesions.  Fortunately her angina has resolved.  She does have a dramatic drop in her blood pressure with both nitroglycerin and Imdur and I would not suggest we do not use Imdur again in the future.  She can continue to use sublingual nitroglycerin but I would like her to lie down before she takes it to prevent sudden drop in blood pressures and possible syncope.  She otherwise appropriately on aspirin and Plavix.    2.  Hypertension well-controlled    3.  Hyperlipidemia goal LDL less than 70 closer to 50.  Last lipid panel was done in the spring and her LDL was 85 she is already on max dose atorvastatin and zetia.  She will repeat lipid panel in the next several weeks if it remains above goal of 70 would recommend switching her to Crestor 40 and repeating lipid panel in 8 weeks.    Thank you for allowing me to participate in this delightful patient's care she can follow-up as scheduled with a stress test and visit with Dr. Aguirre in December.    Makeda Guadarrama PA-C  [facility]     This note was written using Dragon voice recognition system, please excuse any misspelled names, or nonsensical words and call with any questions.        Orders this visit:  Orders Placed This Encounter   Procedures    Lipid Profile    ALT     No orders of the defined types were placed in this encounter.    There are no discontinued medications.  Encounter Diagnosis   Name Primary?    Coronary artery disease involving native coronary artery of native heart without angina pectoris        Current Medications:  Current Outpatient Medications   Medication Sig Dispense Refill    acetaminophen (TYLENOL) 500 MG tablet Take 1,000 mg by mouth 3 times daily as needed for mild pain (500MG X 2 = 1,000MG)      amLODIPine (NORVASC) 5 MG tablet Take 1 tablet (5 mg)  by mouth daily. 90 tablet 3    aspirin (ASA) 81 MG chewable tablet Take 1 tablet (81 mg) by mouth daily 100 tablet 3    atorvastatin (LIPITOR) 80 MG tablet Take 80 mg by mouth daily      carvedilol (COREG) 3.125 MG tablet Take 1 tablet (3.125 mg) by mouth 2 times daily (with meals) 180 tablet 3    clopidogrel (PLAVIX) 75 MG tablet Take 1 tablet (75 mg) by mouth daily. 90 tablet 3    Cyanocobalamin (B-12 PO) Take 1 tablet by mouth daily      escitalopram (LEXAPRO) 10 MG tablet TAKE 1 TABLET (10 MG) BY MOUTH DAILY 90 tablet 3    ezetimibe (ZETIA) 10 MG tablet Take 1 tablet (10 mg) by mouth daily 90 tablet 3    gabapentin (NEURONTIN) 300 MG capsule Take 2 capsules (600 mg) by mouth 3 times daily 180 capsule 5    levothyroxine (SYNTHROID/LEVOTHROID) 25 MCG tablet Take 1 tablet (25 mcg) by mouth daily. 90 tablet 0    LORazepam (ATIVAN) 0.5 MG tablet TAKE 1 TABLET (0.5 MG) BY MOUTH EVERY 4 HOURS AS NEEDED FOR ANXIETY, NAUSEA OR SLEEP 15 tablet 0    oxyCODONE (ROXICODONE) 5 MG tablet TAKE 1 TABLET (5 MG) BY MOUTH EVERY 6 HOURS AS NEEDED FOR MODERATE TO SEVERE PAIN 45 tablet 0    pantoprazole (PROTONIX) 40 MG EC tablet Take 1 tablet (40 mg) by mouth daily 90 tablet 3    prochlorperazine (COMPAZINE) 10 MG tablet TAKE 1/2 TABLET BY MOUTH EVERY 6 HOURS AS NEEDED FORNAUSEA/VOMITING (Patient taking differently: Take 5 mg by mouth every 6 hours as needed for nausea or vomiting. Take 1/2 tablet (5 mg) every 6 hours as needed for nausea or vomiting) 30 tablet 2    senna-docusate (SENEXON-S) 8.6-50 MG tablet Take 2 tablets by mouth at bedtime 100 tablet 0    nitroGLYcerin (NITROSTAT) 0.4 MG sublingual tablet For chest pain place 1 tablet under the tongue every 5 minutes for 3 doses. If symptoms persist 5 minutes after 2nd dose call 911. (Patient not taking: Reported on 10/30/2024) 30 tablet 0       Allergies Reviewed and Updated:  Allergies   Allergen Reactions    Oxybutynin      Patient reports symptoms of nausea and mind fogginess        Review of Systems:  Skin:        Eyes:       ENT:       Respiratory:       Cardiovascular:       Gastroenterology:      Genitourinary:       Musculoskeletal:       Neurologic:       Psychiatric:       Heme/Lymph/Imm:       Endocrine:          Pertinent Past Medical, Surgical and Family History Reviewed and noted above.     CC  Referred Self, MD  No address on file

## 2024-10-30 NOTE — PATIENT INSTRUCTIONS
Thanks for coming into [facility] today.    We discussed: I'm glad your chest pain is better!    Medication changes:  continue current medications.    Follow up: get your cholesterol checked in the next couple of weeks.    Keep your stress test and visit with Dr. Aguirre in December.      Please call the clinic at  [phone] with any questions or concerns and my our nurses will be happy to help.    Please call [phone] for scheduling.      Reminder: Please bring in all current medications, over the counter supplements and vitamin bottles to your next appointment.

## 2024-10-30 NOTE — LETTER
10/30/2024    Phani Albright PA-C  [address] [facility] [address]    RE: Michaela Dorman       Dear Colleague,     I had the pleasure of seeing Michaela Dorman in the [facility].  Lissa is a 74 year old who is being evaluated via a billable video visit.    How would you like to obtain your AVS? MyChart  If the video visit is dropped, the invitation should be resent by: Text to cell phone: [phone]  Will anyone else be joining your video visit? No    Video-Visit Details    Type of service:  Video Visit   Video End Time:1:32 PM  Originating Location (pt. Location): Home    Distant Location (provider location):  On-site  Platform used for Video Visit: [facility]       Cardiology [facility] Progress Note    Service Date: 2024    Primary Cardiologist: Dr. Aguirre      Reason for Visit: chest pain, hypotension f/u     HPI:   Michaela Dorman is a delightful 74-year-old woman with past medical history significant for the followin.  Hypertension  2 hyperlipidemia  3 history of bladder CA  4 history of carotid disease with history of carotid endarterectomy  5.  Coronary artery disease with drug-eluting stent to the OM1 for 99% lesion with 60% stenosis of the proximal circumflex at the bifurcation, and 50% mid LAD lesion .  Negative IFR of the circumflex and LAD on repeat angiogram .    Unfortunately, Kristi struggled with chest pain intermittently throughout the summer.  She was started appropriately on Imdur but this caused severe hypotension and a syncopal episode.  She was last seen by Ms. Croft and at that point it was trialed on a tiny dose of Imdur at 15 mg daily.  Unfortunately again her blood pressure dropped and Imdur was again discontinued.  At that point she had been describing chest pain in the mornings most days of the week.    Today, she states that she had her chest pains been gone for several weeks she has not taken nitro in quite some time.  Blood pressures  "have been good mostly is in the 110s and 120s with the highest being 140 before she takes her morning meds but even that is rare.  She denies syncope or presyncope.  We are doing a virtual visit today as she has been ill with an upper respiratory infection the last several things including fevers or chills but seems to be progressing as expected and improved.  She is hoping to go back to the gym tomorrow.    Physical Exam:  /60   Ht 1.651 m (5' 5\")   Wt 63.5 kg (140 lb)   BMI 23.30 kg/m     Well-developed well-nourished woman in no acute distress.  Appears well.  She is speaking in complete sentences.  She does not appear short of breath there is no obvious retractions.    Data reviewed:  Coronary angiogram x 2  Lipid panel    Assessment and Plan:  1.  Coronary disease status post drug-eluting stent x 2 to the OM1/24 with negative IFR of several other lesions.  Fortunately her angina has resolved.  She does have a dramatic drop in her blood pressure with both nitroglycerin and Imdur and I would not suggest we do not use Imdur again in the future.  She can continue to use sublingual nitroglycerin but I would like her to lie down before she takes it to prevent sudden drop in blood pressures and possible syncope.  She otherwise appropriately on aspirin and Plavix.    2.  Hypertension well-controlled    3.  Hyperlipidemia goal LDL less than 70 closer to 50.  Last lipid panel was done in the spring and her LDL was 85 she is already on max dose atorvastatin and zetia.  She will repeat lipid panel in the next several weeks if it remains above goal of 70 would recommend switching her to Crestor 40 and repeating lipid panel in 8 weeks.    Thank you for allowing me to participate in this delightful patient's care she can follow-up as scheduled with a stress test and visit with Dr. Aguirre in December.    Makeda Guadarrama PA-C  [facility]     This note was written using Dragon voice recognition system, " please excuse any misspelled names, or nonsensical words and call with any questions.        Orders this visit:  Orders Placed This Encounter   Procedures     Lipid Profile     ALT     No orders of the defined types were placed in this encounter.    There are no discontinued medications.  Encounter Diagnosis   Name Primary?     Coronary artery disease involving native coronary artery of native heart without angina pectoris        Current Medications:  Current Outpatient Medications   Medication Sig Dispense Refill     acetaminophen (TYLENOL) 500 MG tablet Take 1,000 mg by mouth 3 times daily as needed for mild pain (500MG X 2 = 1,000MG)       amLODIPine (NORVASC) 5 MG tablet Take 1 tablet (5 mg) by mouth daily. 90 tablet 3     aspirin (ASA) 81 MG chewable tablet Take 1 tablet (81 mg) by mouth daily 100 tablet 3     atorvastatin (LIPITOR) 80 MG tablet Take 80 mg by mouth daily       carvedilol (COREG) 3.125 MG tablet Take 1 tablet (3.125 mg) by mouth 2 times daily (with meals) 180 tablet 3     clopidogrel (PLAVIX) 75 MG tablet Take 1 tablet (75 mg) by mouth daily. 90 tablet 3     Cyanocobalamin (B-12 PO) Take 1 tablet by mouth daily       escitalopram (LEXAPRO) 10 MG tablet TAKE 1 TABLET (10 MG) BY MOUTH DAILY 90 tablet 3     ezetimibe (ZETIA) 10 MG tablet Take 1 tablet (10 mg) by mouth daily 90 tablet 3     gabapentin (NEURONTIN) 300 MG capsule Take 2 capsules (600 mg) by mouth 3 times daily 180 capsule 5     levothyroxine (SYNTHROID/LEVOTHROID) 25 MCG tablet Take 1 tablet (25 mcg) by mouth daily. 90 tablet 0     LORazepam (ATIVAN) 0.5 MG tablet TAKE 1 TABLET (0.5 MG) BY MOUTH EVERY 4 HOURS AS NEEDED FOR ANXIETY, NAUSEA OR SLEEP 15 tablet 0     oxyCODONE (ROXICODONE) 5 MG tablet TAKE 1 TABLET (5 MG) BY MOUTH EVERY 6 HOURS AS NEEDED FOR MODERATE TO SEVERE PAIN 45 tablet 0     pantoprazole (PROTONIX) 40 MG EC tablet Take 1 tablet (40 mg) by mouth daily 90 tablet 3     prochlorperazine (COMPAZINE) 10 MG tablet TAKE 1/2  TABLET BY MOUTH EVERY 6 HOURS AS NEEDED FORNAUSEA/VOMITING (Patient taking differently: Take 5 mg by mouth every 6 hours as needed for nausea or vomiting. Take 1/2 tablet (5 mg) every 6 hours as needed for nausea or vomiting) 30 tablet 2     senna-docusate (SENEXON-S) 8.6-50 MG tablet Take 2 tablets by mouth at bedtime 100 tablet 0     nitroGLYcerin (NITROSTAT) 0.4 MG sublingual tablet For chest pain place 1 tablet under the tongue every 5 minutes for 3 doses. If symptoms persist 5 minutes after 2nd dose call 911. (Patient not taking: Reported on 10/30/2024) 30 tablet 0       Allergies Reviewed and Updated:  Allergies   Allergen Reactions     Oxybutynin      Patient reports symptoms of nausea and mind fogginess       Review of Systems:  Skin:        Eyes:       ENT:       Respiratory:       Cardiovascular:       Gastroenterology:      Genitourinary:       Musculoskeletal:       Neurologic:       Psychiatric:       Heme/Lymph/Imm:       Endocrine:          Pertinent Past Medical, Surgical and Family History Reviewed and noted above.     CC  Referred Self, MD  No address on file      Thank you for allowing me to participate in the care of your patient.      Sincerely,     Makeda Guadarrama PA-C     [facility]  cc:   Referred MD Ankit  No address on file

## 2024-11-05 DIAGNOSIS — F43.21 ADJUSTMENT DISORDER WITH DEPRESSED MOOD: ICD-10-CM

## 2024-11-05 NOTE — TELEPHONE ENCOUNTER
Requested Prescriptions   Pending Prescriptions Disp Refills    LORazepam (ATIVAN) 0.5 MG tablet [Pharmacy Med Name: LORAZEPAM 0.5MG TABLET] 15 tablet 0     Sig: TAKE 1 TABLET (0.5 MG) BY MOUTH EVERY 4 HOURS AS NEEDED FOR ANXIETY, NAUSEA OR SLEEP       There is no refill protocol information for this order          Unable to fill per RN protocol, will forward to provider for review.         Carol Rollins RN on 11/5/2024 at 4:23 PM

## 2024-11-06 ENCOUNTER — NURSE TRIAGE (OUTPATIENT)
Dept: FAMILY MEDICINE | Facility: OTHER | Age: 74
End: 2024-11-06
Payer: COMMERCIAL

## 2024-11-06 RX ORDER — LORAZEPAM 0.5 MG/1
0.5 TABLET ORAL EVERY 4 HOURS PRN
Qty: 15 TABLET | Refills: 0 | Status: SHIPPED | OUTPATIENT
Start: 2024-11-06

## 2024-11-06 NOTE — TELEPHONE ENCOUNTER
Patient calling reporting she needs to schedule a dental procedure and she was not sure if she needed antibiotics prior. She reports she had stents placed in May and she remembered them mentioning antibiotics before dental work. Please advise. Patient OK with [employer] message response or phone call.    Emiliana Belcher RN on 11/6/2024 at 9:02 AM

## 2024-11-07 ENCOUNTER — HOSPITAL ENCOUNTER (OUTPATIENT)
Facility: CLINIC | Age: 74
Discharge: HOME | End: 2024-11-07
Attending: STUDENT IN AN ORGANIZED HEALTH CARE EDUCATION/TRAINING PROGRAM | Admitting: STUDENT IN AN ORGANIZED HEALTH CARE EDUCATION/TRAINING PROGRAM
Payer: MEDICARE

## 2024-11-07 ENCOUNTER — MYC MEDICAL ADVICE (OUTPATIENT)
Dept: FAMILY MEDICINE | Facility: OTHER | Age: 74
End: 2024-11-07
Payer: COMMERCIAL

## 2024-11-07 VITALS
HEART RATE: 75 BPM | OXYGEN SATURATION: 95 % | SYSTOLIC BLOOD PRESSURE: 124 MMHG | DIASTOLIC BLOOD PRESSURE: 87 MMHG | TEMPERATURE: 98.1 F

## 2024-11-07 PROCEDURE — 250N000009 HC RX 250: Performed by: STUDENT IN AN ORGANIZED HEALTH CARE EDUCATION/TRAINING PROGRAM

## 2024-11-07 PROCEDURE — 66821 AFTER CATARACT LASER SURGERY: CPT | Performed by: STUDENT IN AN ORGANIZED HEALTH CARE EDUCATION/TRAINING PROGRAM

## 2024-11-07 RX ORDER — PROPARACAINE HYDROCHLORIDE 5 MG/ML
1 SOLUTION/ DROPS OPHTHALMIC ONCE
Status: DISCONTINUED | OUTPATIENT
Start: 2024-11-07 | End: 2024-11-07 | Stop reason: HOSPADM

## 2024-11-07 RX ORDER — PHENYLEPHRINE HYDROCHLORIDE 25 MG/ML
1 SOLUTION/ DROPS OPHTHALMIC
Status: COMPLETED | OUTPATIENT
Start: 2024-11-07 | End: 2024-11-07

## 2024-11-07 RX ORDER — TROPICAMIDE 10 MG/ML
1 SOLUTION/ DROPS OPHTHALMIC
Status: COMPLETED | OUTPATIENT
Start: 2024-11-07 | End: 2024-11-07

## 2024-11-07 RX ORDER — PROPARACAINE HYDROCHLORIDE 5 MG/ML
1 SOLUTION/ DROPS OPHTHALMIC ONCE
Status: COMPLETED | OUTPATIENT
Start: 2024-11-07 | End: 2024-11-07

## 2024-11-07 RX ADMIN — PHENYLEPHRINE HYDROCHLORIDE 1 DROP: 25 SOLUTION/ DROPS OPHTHALMIC at 13:07

## 2024-11-07 RX ADMIN — TROPICAMIDE 1 DROP: 10 SOLUTION/ DROPS OPHTHALMIC at 13:12

## 2024-11-07 RX ADMIN — TROPICAMIDE 1 DROP: 10 SOLUTION/ DROPS OPHTHALMIC at 13:09

## 2024-11-07 RX ADMIN — PROPARACAINE HYDROCHLORIDE 1 DROP: 5 SOLUTION/ DROPS OPHTHALMIC at 13:08

## 2024-11-07 RX ADMIN — PHENYLEPHRINE HYDROCHLORIDE 1 DROP: 25 SOLUTION/ DROPS OPHTHALMIC at 13:25

## 2024-11-07 RX ADMIN — PHENYLEPHRINE HYDROCHLORIDE 1 DROP: 25 SOLUTION/ DROPS OPHTHALMIC at 13:12

## 2024-11-07 RX ADMIN — TROPICAMIDE 1 DROP: 10 SOLUTION/ DROPS OPHTHALMIC at 13:25

## 2024-11-07 ASSESSMENT — ACTIVITIES OF DAILY LIVING (ADL): ADLS_ACUITY_SCORE: 0

## 2024-11-07 NOTE — OP NOTE
[facility]  Ophthalmology Operative Note     PREOPERATIVE DIAGNOSIS: Posterior Capsular Opacity, LEFT EYE.      POSTOPERATIVE DIAGNOSIS: Posterior Capsular Opacity, LEFT EYE.      OPERATION: YAG Laser Capsulotomy, LEFT EYE     ANESTHESIA: Topical      INDICATIONS FOR PROCEDURE: Lissa Dorman was seen in the [facility] for decreased visual acuity in the left eye. The patient was found to have visually significant posterior capsular opacity in the left eye. The risks, benefits, alternatives and goals of YAG capsulotomy were discussed with the patient, and after adequate discussion the patient understood and agreed to these, and a signed informed consent was obtained prior to the procedure.      DESCRIPTION OF PROCEDURE: After proper patient identification, topical anesthesia was applied to the left eye. The patient was brought to the laser room. A total of 21 shots were placed at the opacified posterior capsule with power of 2.8 mJ. The patient tolerated the procedure well, there were no complications.      Jamison Knowles MD

## 2024-11-07 NOTE — BRIEF OP NOTE
[facility]    Brief Operative Note    Pre-operative diagnosis: Posterior Capsule Opacity, Left Eye  Post-operative diagnosis Same as pre-operative diagnosis    Procedure: Laser YAG capsulotomy, Left - Eye    Surgeon: Surgeons and Role:     * Jamison Knowles MD - Primary  Anesthesia: Topical   Estimated Blood Loss: None    Drains: None  Specimens: * No specimens in log *  Findings:   None.  Complications: None.  Implants: * No implants in log *

## 2024-11-07 NOTE — TELEPHONE ENCOUNTER
Provider responded to patient via [employer]. Will close this encounter.    Emiliana Belcher RN on 11/7/2024 at 10:00 AM

## 2024-11-21 DIAGNOSIS — F43.21 ADJUSTMENT DISORDER WITH DEPRESSED MOOD: ICD-10-CM

## 2024-11-24 DIAGNOSIS — G62.9 NEUROPATHY: ICD-10-CM

## 2024-11-25 ENCOUNTER — HOSPITAL ENCOUNTER (OUTPATIENT)
Dept: CT IMAGING | Facility: CLINIC | Age: 74
Discharge: HOME | End: 2024-11-25
Attending: INTERNAL MEDICINE
Payer: MEDICARE

## 2024-11-25 ENCOUNTER — MYC MEDICAL ADVICE (OUTPATIENT)
Dept: FAMILY MEDICINE | Facility: OTHER | Age: 74
End: 2024-11-25

## 2024-11-25 ENCOUNTER — INFUSION THERAPY VISIT (OUTPATIENT)
Dept: INFUSION THERAPY | Facility: CLINIC | Age: 74
End: 2024-11-25
Attending: INTERNAL MEDICINE
Payer: MEDICARE

## 2024-11-25 DIAGNOSIS — C50.412 MALIGNANT NEOPLASM OF UPPER-OUTER QUADRANT OF LEFT BREAST IN FEMALE, ESTROGEN RECEPTOR NEGATIVE (H): Primary | ICD-10-CM

## 2024-11-25 DIAGNOSIS — Z17.1 MALIGNANT NEOPLASM OF UPPER-OUTER QUADRANT OF LEFT BREAST IN FEMALE, ESTROGEN RECEPTOR NEGATIVE (H): Primary | ICD-10-CM

## 2024-11-25 DIAGNOSIS — I25.10 CORONARY ARTERY DISEASE INVOLVING NATIVE CORONARY ARTERY OF NATIVE HEART WITHOUT ANGINA PECTORIS: ICD-10-CM

## 2024-11-25 DIAGNOSIS — R53.0 NEOPLASTIC (MALIGNANT) RELATED FATIGUE: ICD-10-CM

## 2024-11-25 DIAGNOSIS — C67.9 UROTHELIAL CARCINOMA OF BLADDER WITH INVASION OF MUSCLE (H): ICD-10-CM

## 2024-11-25 DIAGNOSIS — C77.5 SECONDARY AND UNSPECIFIED MALIGNANT NEOPLASM OF INTRAPELVIC LYMPH NODES (H): ICD-10-CM

## 2024-11-25 LAB
ALBUMIN SERPL BCG-MCNC: 4.5 G/DL (ref 3.5–5.2)
ALP SERPL-CCNC: 58 U/L (ref 40–150)
ALT SERPL W P-5'-P-CCNC: 24 U/L (ref 0–50)
ANION GAP SERPL CALCULATED.3IONS-SCNC: 14 MMOL/L (ref 7–15)
AST SERPL W P-5'-P-CCNC: 26 U/L (ref 0–45)
BASOPHILS # BLD AUTO: 0.1 10E3/UL (ref 0–0.2)
BASOPHILS NFR BLD AUTO: 1 %
BILIRUB SERPL-MCNC: 0.3 MG/DL
BUN SERPL-MCNC: 27.3 MG/DL (ref 8–23)
CALCIUM SERPL-MCNC: 9.8 MG/DL (ref 8.8–10.4)
CHLORIDE SERPL-SCNC: 104 MMOL/L (ref 98–107)
CHOLEST SERPL-MCNC: 179 MG/DL
CREAT BLD-MCNC: 1.3 MG/DL (ref 0.5–1)
CREAT SERPL-MCNC: 1.02 MG/DL (ref 0.51–0.95)
EGFRCR SERPLBLD CKD-EPI 2021: 43 ML/MIN/1.73M2
EGFRCR SERPLBLD CKD-EPI 2021: 57 ML/MIN/1.73M2
EOSINOPHIL # BLD AUTO: 0.3 10E3/UL (ref 0–0.7)
EOSINOPHIL NFR BLD AUTO: 4 %
ERYTHROCYTE [DISTWIDTH] IN BLOOD BY AUTOMATED COUNT: 14.3 % (ref 10–15)
ERYTHROCYTE [SEDIMENTATION RATE] IN BLOOD BY WESTERGREN METHOD: 17 MM/HR (ref 0–30)
FASTING STATUS PATIENT QL REPORTED: NO
FASTING STATUS PATIENT QL REPORTED: NO
GLUCOSE SERPL-MCNC: 83 MG/DL (ref 70–99)
HCO3 SERPL-SCNC: 22 MMOL/L (ref 22–29)
HCT VFR BLD AUTO: 34.1 % (ref 35–47)
HDLC SERPL-MCNC: 78 MG/DL
HGB BLD-MCNC: 11.6 G/DL (ref 11.7–15.7)
IMM GRANULOCYTES # BLD: 0 10E3/UL
IMM GRANULOCYTES NFR BLD: 1 %
LDLC SERPL CALC-MCNC: 74 MG/DL
LYMPHOCYTES # BLD AUTO: 1.8 10E3/UL (ref 0.8–5.3)
LYMPHOCYTES NFR BLD AUTO: 22 %
MCH RBC QN AUTO: 33.5 PG (ref 26.5–33)
MCHC RBC AUTO-ENTMCNC: 34 G/DL (ref 31.5–36.5)
MCV RBC AUTO: 99 FL (ref 78–100)
MONOCYTES # BLD AUTO: 0.4 10E3/UL (ref 0–1.3)
MONOCYTES NFR BLD AUTO: 5 %
NEUTROPHILS # BLD AUTO: 5.7 10E3/UL (ref 1.6–8.3)
NEUTROPHILS NFR BLD AUTO: 68 %
NONHDLC SERPL-MCNC: 101 MG/DL
NRBC # BLD AUTO: 0 10E3/UL
NRBC BLD AUTO-RTO: 0 /100
PLATELET # BLD AUTO: 285 10E3/UL (ref 150–450)
POTASSIUM SERPL-SCNC: 4.7 MMOL/L (ref 3.4–5.3)
PROT SERPL-MCNC: 7.5 G/DL (ref 6.4–8.3)
RBC # BLD AUTO: 3.46 10E6/UL (ref 3.8–5.2)
SODIUM SERPL-SCNC: 140 MMOL/L (ref 135–145)
TRIGL SERPL-MCNC: 137 MG/DL
TSH SERPL DL<=0.005 MIU/L-ACNC: 1.99 UIU/ML (ref 0.3–4.2)
WBC # BLD AUTO: 8.4 10E3/UL (ref 4–11)

## 2024-11-25 PROCEDURE — 80061 LIPID PANEL: CPT

## 2024-11-25 PROCEDURE — 82465 ASSAY BLD/SERUM CHOLESTEROL: CPT

## 2024-11-25 PROCEDURE — 85041 AUTOMATED RBC COUNT: CPT

## 2024-11-25 PROCEDURE — 250N000011 HC RX IP 250 OP 636: Performed by: INTERNAL MEDICINE

## 2024-11-25 PROCEDURE — 85652 RBC SED RATE AUTOMATED: CPT

## 2024-11-25 PROCEDURE — 82247 BILIRUBIN TOTAL: CPT

## 2024-11-25 PROCEDURE — 82040 ASSAY OF SERUM ALBUMIN: CPT

## 2024-11-25 PROCEDURE — 80053 COMPREHEN METABOLIC PANEL: CPT

## 2024-11-25 PROCEDURE — 82565 ASSAY OF CREATININE: CPT

## 2024-11-25 PROCEDURE — 84443 ASSAY THYROID STIM HORMONE: CPT

## 2024-11-25 PROCEDURE — 36591 DRAW BLOOD OFF VENOUS DEVICE: CPT

## 2024-11-25 PROCEDURE — 74177 CT ABD & PELVIS W/CONTRAST: CPT | Mod: MG

## 2024-11-25 PROCEDURE — 85004 AUTOMATED DIFF WBC COUNT: CPT

## 2024-11-25 RX ORDER — HEPARIN SODIUM (PORCINE) LOCK FLUSH IV SOLN 100 UNIT/ML 100 UNIT/ML
5 SOLUTION INTRAVENOUS
OUTPATIENT
Start: 2024-11-25

## 2024-11-25 RX ORDER — GABAPENTIN 300 MG/1
600 CAPSULE ORAL 3 TIMES DAILY
Qty: 540 CAPSULE | Refills: 1 | Status: SHIPPED | OUTPATIENT
Start: 2024-11-25

## 2024-11-25 RX ORDER — IOPAMIDOL 755 MG/ML
500 INJECTION, SOLUTION INTRAVASCULAR ONCE
Status: COMPLETED | OUTPATIENT
Start: 2024-11-25 | End: 2024-11-25

## 2024-11-25 RX ORDER — HEPARIN SODIUM (PORCINE) LOCK FLUSH IV SOLN 100 UNIT/ML 100 UNIT/ML
5 SOLUTION INTRAVENOUS
Status: DISCONTINUED | OUTPATIENT
Start: 2024-11-25 | End: 2024-11-25 | Stop reason: HOSPADM

## 2024-11-25 RX ORDER — LORAZEPAM 0.5 MG/1
0.5 TABLET ORAL EVERY 4 HOURS PRN
Qty: 15 TABLET | Refills: 0 | Status: SHIPPED | OUTPATIENT
Start: 2024-11-25

## 2024-11-25 RX ADMIN — IOPAMIDOL 70 ML: 755 INJECTION, SOLUTION INTRAVENOUS at 10:07

## 2024-11-25 RX ADMIN — HEPARIN 5 ML: 100 SYRINGE at 10:18

## 2024-11-25 NOTE — PROGRESS NOTES
Infusion Nursing Note:  Michaela KENYON Larsonalmazgricelda presents today for Power port access/labs.    Patient seen by provider today: No   present during visit today: Not Applicable.    Note: Patient ambulated to IVO. Doing well, no complaints.       Intravenous Access:  Labs drawn without difficulty.  Implanted Port.    Treatment Conditions:  Lab Results   Component Value Date    HGB 11.6 (L) 11/25/2024    WBC 8.4 11/25/2024    ANEU 3.5 06/01/2021    ANEUTAUTO 5.7 11/25/2024     11/25/2024        Lab Results   Component Value Date     11/25/2024    POTASSIUM 4.7 11/25/2024    MAG 1.9 07/06/2024    CR 1.3 (H) 11/25/2024    VANDANA 9.8 11/25/2024    BILITOTAL 0.3 11/25/2024    ALBUMIN 4.5 11/25/2024    ALT 24 11/25/2024    AST 26 11/25/2024         Post Infusion Assessment:  Blood return noted pre and post CT Scan.  Site patent and intact, free from redness, edema or discomfort.  Access discontinued per protocol.       Discharge Plan:   Patient discharged in stable condition accompanied by: self.  Departure Mode: Ambulatory.      Tami Knowles RN

## 2024-11-25 NOTE — TELEPHONE ENCOUNTER
RX Authorization    Medication: Gabapentin (NEURONTIN) 300 MG capsules    Date last refill ordered: 10/12/2023    Quantity ordered: 180    # refills: 5    Date of last clinic visit with ordering provider: 10/12/2023    Date of next clinic visit with ordering provider:    All pertinent protocol data (lab date/result):    Include pertinent information from patients message:

## 2024-11-27 ENCOUNTER — VIRTUAL VISIT (OUTPATIENT)
Dept: ONCOLOGY | Facility: CLINIC | Age: 74
End: 2024-11-27
Attending: INTERNAL MEDICINE
Payer: COMMERCIAL

## 2024-11-27 ENCOUNTER — PATIENT OUTREACH (OUTPATIENT)
Dept: ONCOLOGY | Facility: CLINIC | Age: 74
End: 2024-11-27
Payer: COMMERCIAL

## 2024-11-27 VITALS — WEIGHT: 146 LBS | BODY MASS INDEX: 24.32 KG/M2 | HEIGHT: 65 IN

## 2024-11-27 DIAGNOSIS — G62.0 CHEMOTHERAPY-INDUCED NEUROPATHY (H): ICD-10-CM

## 2024-11-27 DIAGNOSIS — C67.9 UROTHELIAL CARCINOMA OF BLADDER WITH INVASION OF MUSCLE (H): Primary | ICD-10-CM

## 2024-11-27 DIAGNOSIS — T45.1X5A CHEMOTHERAPY-INDUCED NEUROPATHY (H): ICD-10-CM

## 2024-11-27 DIAGNOSIS — C50.412 MALIGNANT NEOPLASM OF UPPER-OUTER QUADRANT OF LEFT BREAST IN FEMALE, ESTROGEN RECEPTOR NEGATIVE (H): ICD-10-CM

## 2024-11-27 DIAGNOSIS — R53.0 NEOPLASTIC (MALIGNANT) RELATED FATIGUE: ICD-10-CM

## 2024-11-27 DIAGNOSIS — Z17.1 MALIGNANT NEOPLASM OF UPPER-OUTER QUADRANT OF LEFT BREAST IN FEMALE, ESTROGEN RECEPTOR NEGATIVE (H): ICD-10-CM

## 2024-11-27 DIAGNOSIS — R91.8 PULMONARY NODULES: ICD-10-CM

## 2024-11-27 ASSESSMENT — PAIN SCALES - GENERAL: PAINLEVEL_OUTOF10: NO PAIN (0)

## 2024-11-27 NOTE — PROGRESS NOTES
Is the patient currently in the state of [place]? YES    Visit mode:VIDEO    If the visit is dropped, the patient can be reconnected by:VIDEO VISIT: Send to e-mail at: [email or web address]    Will anyone else be joining the visit? NO  (If patient encounters technical issues they should call [phone])    Are changes needed to the allergy or medication list? No    Are refills needed on medications prescribed by this physician? NO    Rooming Documentation:  Questionnaire(s) completed    Reason for visit: Video Visit    Tish OATES

## 2024-11-27 NOTE — LETTER
2024      Michaela Dorman  [address]  [facility] [address]      Dear Colleague,    Thank you for referring your patient, Michaela Dorman, to the [facility] [place] [facility]. Please see a copy of my visit note below.    [facility] Hematology / Oncology  Progress Note  Name: Michaela Dorman  :  1950  MRN:  [id number]    --------------------    Subjective:  Lissa returns for follow-up of bladder cancer unaccompanied via video visit.  No symptoms suggestive of cancer recurrence.  No long-term side effects from prior treatments.  No symptoms of immune complication recurrence.  She is working with her primary care provider on chronic fatigue.    --------------------    Objective:  Video visit.    We reviewed CBC, CMP, TSH.  We reviewed CT CAP w/ independent review.    --------------------    Assessment / Plan:  Left-sided ER-, HER2+ breast CA:  # Sep 2016 Presented w/ left axillary mass; staging multicentric T4 lesion w/ geraldine involvement; biopsy w/ ER-, HER2+ breast cancer  # Sep 2016 - 2017 Neoadjuvant AC x 4 cycles/TH x 4 cycles.  # 2017 Bilateral mastectomy no with geraldine evaluation; complete path CR from invasive cancer; 7 mm DCIS residual.  # 2017 - May 2017 Adjuvant radiation to left chestwall / axilla.  # 2017 - Dec 2017 Adjuvant Herceptin.    Bladder cancer:([id number])  # Oct 2020 Presented w/ dysuria.  # 2021 Cytoscopy w/ muscle-invasive high grade urothelial cancer.  # Mar 2021 TURBT.  # 2021 Neoadjuvant cisplatin/gem split dose complicated by TAMIR.  # May 2021 Neoadjuvant carboplatin/gem.  # 2021 Radical cystectomy w/ ileal conduit; path high grade urothelial carcinoma muscle invasive; margins negs; nodes negative.  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    Continue observation; clinically and radiographically DAGOBERTO.  Reviewed stable incidental findings on imaging.  Continued risk for immunotoxicities given history of delayed presentations; steroid-responsive.  Continue gabapentin 600 mg TID for neuropathy, wean as able.  Counts w/ stable mild anemia (multifactorial).  Chemistries w/ stable CKD3a.  TSH stable.    Patient Instructions   BJT CT 6 months w/ labs (CBC, CMP, TSH) and CT CAP.    Girish Sauer MD      Video Visit:  Michaela is a 74 year old female who is being evaluated via a billable video visit.  }    Video start time:  3:05 PM  Video end time:  3:21 PM  Provider location: [facility]  Patient location: Home  Mode of transmission:  Portal / [employer].            Again, thank you for allowing me to participate in the care of your patient.        Sincerely,        Girish Sauer MD

## 2024-11-27 NOTE — PROGRESS NOTES
"Virtual Visit Details    Type of service:  Video Visit   Video Start Time: {video visit start/end time for provider to select:461459}  Video End Time:{video visit start/end time for provider to select:077808}    Originating Location (pt. Location): {video visit patient location:578155::\"Home\"}  {PROVIDER LOCATION On-site should be selected for visits conducted from your clinic location or adjoining [facility] hospital, academic office, or other nearby [facility] building. Off-site should be selected for all other provider locations, including home:250194}  Distant Location (provider location):  {virtual location provider:831821}  Platform used for Video Visit: {Virtual Visit Platforms:273362::\"[employer]\"}    "

## 2024-11-28 NOTE — PROGRESS NOTES
[facility] Hematology / Oncology  Progress Note  Name: Michaela Dorman  :  1950  MRN:  [id number]    --------------------    Subjective:  Lissa returns for follow-up of bladder cancer unaccompanied via video visit.  No symptoms suggestive of cancer recurrence.  No long-term side effects from prior treatments.  No symptoms of immune complication recurrence.  She is working with her primary care provider on chronic fatigue.    --------------------    Objective:  Video visit.    We reviewed CBC, CMP, TSH.  We reviewed CT CAP w/ independent review.    --------------------    Assessment / Plan:  Left-sided ER-, HER2+ breast CA:  # Sep 2016 Presented w/ left axillary mass; staging multicentric T4 lesion w/ geraldine involvement; biopsy w/ ER-, HER2+ breast cancer  # Sep 2016 - 2017 Neoadjuvant AC x 4 cycles/TH x 4 cycles.  # 2017 Bilateral mastectomy no with geraldine evaluation; complete path CR from invasive cancer; 7 mm DCIS residual.  # 2017 - May 2017 Adjuvant radiation to left chestwall / axilla.  # 2017 - Dec 2017 Adjuvant Herceptin.    Bladder cancer:([id number])  # Oct 2020 Presented w/ dysuria.  # 2021 Cytoscopy w/ muscle-invasive high grade urothelial cancer.  # Mar 2021 TURBT.  # 2021 Neoadjuvant cisplatin/gem split dose complicated by TAMIR.  # May 2021 Neoadjuvant carboplatin/gem.  # 2021 Radical cystectomy w/ ileal conduit; path high grade urothelial carcinoma muscle invasive; margins negs; nodes negative.  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    Continue observation; clinically and radiographically DAGOBERTO.  Reviewed stable incidental findings on imaging.  Continued risk for immunotoxicities given history of delayed presentations; steroid-responsive.  Continue gabapentin 600 mg  TID for neuropathy, wean as able.  Counts w/ stable mild anemia (multifactorial).  Chemistries w/ stable CKD3a.  TSH stable.    Patient Instructions   BJT IA 6 months w/ labs (CBC, CMP, TSH) and CT CAP.    Girish Sauer MD      Video Visit:  Michaela is a 74 year old female who is being evaluated via a billable video visit.  }    Video start time:  3:05 PM  Video end time:  3:21 PM  Provider location: [facility]  Patient location: Home  Mode of transmission:  Portal / [facility].

## 2024-12-04 ENCOUNTER — PATIENT OUTREACH (OUTPATIENT)
Dept: ONCOLOGY | Facility: CLINIC | Age: 74
End: 2024-12-04
Payer: COMMERCIAL

## 2024-12-04 ENCOUNTER — TELEPHONE (OUTPATIENT)
Dept: CARDIOLOGY | Facility: CLINIC | Age: 74
End: 2024-12-04
Payer: COMMERCIAL

## 2024-12-04 NOTE — TELEPHONE ENCOUNTER
[facility]: Cancer Care Follow-Up Note                                    Discussion with Patient:                                                      Spoke with patient to check-in since last office visit with Dr. Sauer. Patient states she is doing well and without concerns.     Dates of Treatment:                                                      Observation    Assessment:                                                    RNCC Short Symptom Review:     Assessment completed with:: Patient    General/Short Assessment  Does the patient have all their medications?: Yes    Pain  Patient Reported Pain?: No    Patient Coping  Accepting    Clinic Utilization  Patient Aware of Next Appointment?: Yes    Other Patient Concerns  Other Patient Reported Concerns: Yes, see notes    Intervention/Education provided during outreach:                                                       No intervention needed at this time. Patient scheduled appropriately, will continue to follow.    Patient to follow up as scheduled at next appt  Patient to call/[employer] message with updates  Confirmed patient has clinic and triage numbers    Signature:  Noemi Garcia RN

## 2024-12-04 NOTE — TELEPHONE ENCOUNTER
Patient is scheduled for [place] stress test tomorrow 12/5/24, spoke with patient who said she has been feeling great, going to the gym everyday, and hasn't had to use any nitroglycerin, and wondering if still needs to keep stress test for tomorrow. Spoke with KINA Molina and reviewed patient's chart with her who agreed okay to cancel tomorrow stress test, keep Dr. Aguirre follow up visit for next week 12/10/24 who can then further decide if any cardiac testing is needing later on.

## 2024-12-17 ENCOUNTER — APPOINTMENT (OUTPATIENT)
Dept: GENERAL RADIOLOGY | Facility: CLINIC | Age: 74
End: 2024-12-17
Attending: PHYSICIAN ASSISTANT
Payer: MEDICARE

## 2024-12-17 ENCOUNTER — HOSPITAL ENCOUNTER (EMERGENCY)
Facility: CLINIC | Age: 74
Discharge: HOME | End: 2024-12-17
Attending: PHYSICIAN ASSISTANT | Admitting: PHYSICIAN ASSISTANT
Payer: MEDICARE

## 2024-12-17 VITALS
OXYGEN SATURATION: 98 % | RESPIRATION RATE: 20 BRPM | BODY MASS INDEX: 23.8 KG/M2 | DIASTOLIC BLOOD PRESSURE: 79 MMHG | TEMPERATURE: 99.8 F | SYSTOLIC BLOOD PRESSURE: 158 MMHG | HEART RATE: 100 BPM | WEIGHT: 143 LBS

## 2024-12-17 DIAGNOSIS — J06.9 VIRAL URI WITH COUGH: ICD-10-CM

## 2024-12-17 DIAGNOSIS — N39.0 COMPLICATED URINARY TRACT INFECTION: ICD-10-CM

## 2024-12-17 LAB
ALBUMIN SERPL BCG-MCNC: 4.3 G/DL (ref 3.5–5.2)
ALBUMIN UR-MCNC: 20 MG/DL
ALP SERPL-CCNC: 96 U/L (ref 40–150)
ALT SERPL W P-5'-P-CCNC: 25 U/L (ref 0–50)
ANION GAP SERPL CALCULATED.3IONS-SCNC: 17 MMOL/L (ref 7–15)
APPEARANCE UR: ABNORMAL
AST SERPL W P-5'-P-CCNC: 31 U/L (ref 0–45)
ATRIAL RATE - MUSE: 97 BPM
BACTERIA #/AREA URNS HPF: ABNORMAL /HPF
BASOPHILS # BLD AUTO: 0.1 10E3/UL (ref 0–0.2)
BASOPHILS NFR BLD AUTO: 0 %
BILIRUB SERPL-MCNC: 0.5 MG/DL
BILIRUB UR QL STRIP: NEGATIVE
BUN SERPL-MCNC: 17.7 MG/DL (ref 8–23)
CALCIUM SERPL-MCNC: 9.9 MG/DL (ref 8.8–10.4)
CHLORIDE SERPL-SCNC: 101 MMOL/L (ref 98–107)
COLOR UR AUTO: ABNORMAL
CREAT SERPL-MCNC: 0.85 MG/DL (ref 0.51–0.95)
CRP SERPL-MCNC: 148.31 MG/L
DIASTOLIC BLOOD PRESSURE - MUSE: NORMAL MMHG
EGFRCR SERPLBLD CKD-EPI 2021: 71 ML/MIN/1.73M2
EOSINOPHIL # BLD AUTO: 0.1 10E3/UL (ref 0–0.7)
EOSINOPHIL NFR BLD AUTO: 1 %
ERYTHROCYTE [DISTWIDTH] IN BLOOD BY AUTOMATED COUNT: 15.1 % (ref 10–15)
FLUAV RNA SPEC QL NAA+PROBE: NEGATIVE
FLUBV RNA RESP QL NAA+PROBE: NEGATIVE
GLUCOSE SERPL-MCNC: 89 MG/DL (ref 70–99)
GLUCOSE UR STRIP-MCNC: NEGATIVE MG/DL
HCO3 SERPL-SCNC: 19 MMOL/L (ref 22–29)
HCT VFR BLD AUTO: 32.6 % (ref 35–47)
HGB BLD-MCNC: 10.9 G/DL (ref 11.7–15.7)
HGB UR QL STRIP: ABNORMAL
IMM GRANULOCYTES # BLD: 0.1 10E3/UL
IMM GRANULOCYTES NFR BLD: 1 %
INTERPRETATION ECG - MUSE: NORMAL
KETONES UR STRIP-MCNC: ABNORMAL MG/DL
LACTATE SERPL-SCNC: 1 MMOL/L (ref 0.7–2)
LEUKOCYTE ESTERASE UR QL STRIP: ABNORMAL
LYMPHOCYTES # BLD AUTO: 1.3 10E3/UL (ref 0.8–5.3)
LYMPHOCYTES NFR BLD AUTO: 10 %
MCH RBC QN AUTO: 33.3 PG (ref 26.5–33)
MCHC RBC AUTO-ENTMCNC: 33.4 G/DL (ref 31.5–36.5)
MCV RBC AUTO: 100 FL (ref 78–100)
MONOCYTES # BLD AUTO: 1.1 10E3/UL (ref 0–1.3)
MONOCYTES NFR BLD AUTO: 9 %
NEUTROPHILS # BLD AUTO: 9.9 10E3/UL (ref 1.6–8.3)
NEUTROPHILS NFR BLD AUTO: 80 %
NITRATE UR QL: POSITIVE
NRBC # BLD AUTO: 0 10E3/UL
NRBC BLD AUTO-RTO: 0 /100
P AXIS - MUSE: 68 DEGREES
PH UR STRIP: 6.5 [PH] (ref 5–7)
PLATELET # BLD AUTO: 237 10E3/UL (ref 150–450)
POTASSIUM SERPL-SCNC: 3.9 MMOL/L (ref 3.4–5.3)
PR INTERVAL - MUSE: 146 MS
PROCALCITONIN SERPL IA-MCNC: 0.71 NG/ML
PROT SERPL-MCNC: 7.7 G/DL (ref 6.4–8.3)
QRS DURATION - MUSE: 92 MS
QT - MUSE: 318 MS
QTC - MUSE: 403 MS
R AXIS - MUSE: 56 DEGREES
RBC # BLD AUTO: 3.27 10E6/UL (ref 3.8–5.2)
RBC URINE: 7 /HPF
RSV RNA SPEC NAA+PROBE: NEGATIVE
SARS-COV-2 RNA RESP QL NAA+PROBE: NEGATIVE
SODIUM SERPL-SCNC: 137 MMOL/L (ref 135–145)
SP GR UR STRIP: 1.02 (ref 1–1.03)
SYSTOLIC BLOOD PRESSURE - MUSE: NORMAL MMHG
T AXIS - MUSE: 49 DEGREES
TROPONIN T SERPL HS-MCNC: 26 NG/L
UROBILINOGEN UR STRIP-MCNC: NORMAL MG/DL
VENTRICULAR RATE- MUSE: 97 BPM
WBC # BLD AUTO: 12.4 10E3/UL (ref 4–11)
WBC URINE: 38 /HPF

## 2024-12-17 PROCEDURE — 80053 COMPREHEN METABOLIC PANEL: CPT | Performed by: PHYSICIAN ASSISTANT

## 2024-12-17 PROCEDURE — 87086 URINE CULTURE/COLONY COUNT: CPT | Performed by: PHYSICIAN ASSISTANT

## 2024-12-17 PROCEDURE — 85025 COMPLETE CBC W/AUTO DIFF WBC: CPT | Performed by: PHYSICIAN ASSISTANT

## 2024-12-17 PROCEDURE — 96376 TX/PRO/DX INJ SAME DRUG ADON: CPT | Performed by: PHYSICIAN ASSISTANT

## 2024-12-17 PROCEDURE — 93010 ELECTROCARDIOGRAM REPORT: CPT | Performed by: PHYSICIAN ASSISTANT

## 2024-12-17 PROCEDURE — 96365 THER/PROPH/DIAG IV INF INIT: CPT | Performed by: PHYSICIAN ASSISTANT

## 2024-12-17 PROCEDURE — 84484 ASSAY OF TROPONIN QUANT: CPT | Performed by: PHYSICIAN ASSISTANT

## 2024-12-17 PROCEDURE — 99285 EMERGENCY DEPT VISIT HI MDM: CPT | Mod: 25 | Performed by: PHYSICIAN ASSISTANT

## 2024-12-17 PROCEDURE — 84145 PROCALCITONIN (PCT): CPT | Performed by: PHYSICIAN ASSISTANT

## 2024-12-17 PROCEDURE — 87637 SARSCOV2&INF A&B&RSV AMP PRB: CPT | Performed by: PHYSICIAN ASSISTANT

## 2024-12-17 PROCEDURE — 96375 TX/PRO/DX INJ NEW DRUG ADDON: CPT | Performed by: PHYSICIAN ASSISTANT

## 2024-12-17 PROCEDURE — 86140 C-REACTIVE PROTEIN: CPT | Performed by: PHYSICIAN ASSISTANT

## 2024-12-17 PROCEDURE — 81001 URINALYSIS AUTO W/SCOPE: CPT | Performed by: PHYSICIAN ASSISTANT

## 2024-12-17 PROCEDURE — 96361 HYDRATE IV INFUSION ADD-ON: CPT | Performed by: PHYSICIAN ASSISTANT

## 2024-12-17 PROCEDURE — 258N000003 HC RX IP 258 OP 636: Performed by: PHYSICIAN ASSISTANT

## 2024-12-17 PROCEDURE — 250N000009 HC RX 250

## 2024-12-17 PROCEDURE — 99284 EMERGENCY DEPT VISIT MOD MDM: CPT | Performed by: PHYSICIAN ASSISTANT

## 2024-12-17 PROCEDURE — 87186 SC STD MICRODIL/AGAR DIL: CPT | Performed by: PHYSICIAN ASSISTANT

## 2024-12-17 PROCEDURE — 36415 COLL VENOUS BLD VENIPUNCTURE: CPT | Performed by: PHYSICIAN ASSISTANT

## 2024-12-17 PROCEDURE — 93005 ELECTROCARDIOGRAM TRACING: CPT | Performed by: PHYSICIAN ASSISTANT

## 2024-12-17 PROCEDURE — 250N000011 HC RX IP 250 OP 636: Performed by: PHYSICIAN ASSISTANT

## 2024-12-17 PROCEDURE — 83605 ASSAY OF LACTIC ACID: CPT | Performed by: PHYSICIAN ASSISTANT

## 2024-12-17 PROCEDURE — 71046 X-RAY EXAM CHEST 2 VIEWS: CPT

## 2024-12-17 RX ORDER — CEFUROXIME AXETIL 250 MG/1
250 TABLET ORAL 2 TIMES DAILY
Qty: 14 TABLET | Refills: 0 | Status: SHIPPED | OUTPATIENT
Start: 2024-12-17 | End: 2024-12-19

## 2024-12-17 RX ORDER — HYDROMORPHONE HYDROCHLORIDE 1 MG/ML
0.5 INJECTION, SOLUTION INTRAMUSCULAR; INTRAVENOUS; SUBCUTANEOUS EVERY 30 MIN PRN
Status: DISCONTINUED | OUTPATIENT
Start: 2024-12-17 | End: 2024-12-17 | Stop reason: HOSPADM

## 2024-12-17 RX ORDER — HEPARIN SODIUM (PORCINE) LOCK FLUSH IV SOLN 100 UNIT/ML 100 UNIT/ML
5-10 SOLUTION INTRAVENOUS
Status: DISCONTINUED | OUTPATIENT
Start: 2024-12-17 | End: 2024-12-17 | Stop reason: HOSPADM

## 2024-12-17 RX ORDER — ONDANSETRON 2 MG/ML
4 INJECTION INTRAMUSCULAR; INTRAVENOUS EVERY 30 MIN PRN
Status: DISCONTINUED | OUTPATIENT
Start: 2024-12-17 | End: 2024-12-17 | Stop reason: HOSPADM

## 2024-12-17 RX ORDER — CEFTRIAXONE 2 G/1
2 INJECTION, POWDER, FOR SOLUTION INTRAMUSCULAR; INTRAVENOUS ONCE
Status: DISCONTINUED | OUTPATIENT
Start: 2024-12-17 | End: 2024-12-17

## 2024-12-17 RX ORDER — CEFOTETAN DISODIUM 2 G/20ML
2 INJECTION, POWDER, FOR SOLUTION INTRAMUSCULAR; INTRAVENOUS ONCE
Status: COMPLETED | OUTPATIENT
Start: 2024-12-17 | End: 2024-12-17

## 2024-12-17 RX ADMIN — SODIUM CHLORIDE 1000 ML: 9 INJECTION, SOLUTION INTRAVENOUS at 13:45

## 2024-12-17 RX ADMIN — HYDROMORPHONE HYDROCHLORIDE 0.5 MG: 1 INJECTION, SOLUTION INTRAMUSCULAR; INTRAVENOUS; SUBCUTANEOUS at 13:51

## 2024-12-17 RX ADMIN — HEPARIN 5 ML: 100 SYRINGE at 16:32

## 2024-12-17 RX ADMIN — CEFOTETAN DISODIUM 2 G: 2 INJECTION, POWDER, FOR SOLUTION INTRAMUSCULAR; INTRAVENOUS at 15:41

## 2024-12-17 RX ADMIN — HYDROMORPHONE HYDROCHLORIDE 0.5 MG: 1 INJECTION, SOLUTION INTRAMUSCULAR; INTRAVENOUS; SUBCUTANEOUS at 15:46

## 2024-12-17 RX ADMIN — ONDANSETRON 4 MG: 2 INJECTION, SOLUTION INTRAMUSCULAR; INTRAVENOUS at 13:51

## 2024-12-17 ASSESSMENT — COLUMBIA-SUICIDE SEVERITY RATING SCALE - C-SSRS
2. HAVE YOU ACTUALLY HAD ANY THOUGHTS OF KILLING YOURSELF IN THE PAST MONTH?: NO
1. IN THE PAST MONTH, HAVE YOU WISHED YOU WERE DEAD OR WISHED YOU COULD GO TO SLEEP AND NOT WAKE UP?: NO
6. HAVE YOU EVER DONE ANYTHING, STARTED TO DO ANYTHING, OR PREPARED TO DO ANYTHING TO END YOUR LIFE?: NO

## 2024-12-17 ASSESSMENT — ACTIVITIES OF DAILY LIVING (ADL)
ADLS_ACUITY_SCORE: 58

## 2024-12-17 NOTE — ED PROVIDER NOTES
History     Chief Complaint   Patient presents with    Shortness of Breath       HPI  Michaela Dorman is a 74 year old female with a history of coronary artery disease s/p stenting on Plavix and aspirin, bladder cancer s/p resection with ileal conduit in place, breast cancer s/p mastectomy, stage III chronic kidney disease, hypothyroidism, chronic pain on continuous opioids, who presents to the emergency department complaining of shortness of breath.  The patient reports on Sunday night, 2 days ago, she started having generalized abdominal pain with nausea.  Reports a lot of the pain moved into her chest since then and she describes it as a pressing heaviness with shortness of breath.  She has had a cough and congestion with bodyaches and fever up to 100.3  F.  She has taken Tylenol for the fever.  She has been taking Compazine for her nausea which helped yesterday but not today.  She also tried some lorazepam for her anxiety symptoms.  She has not had any vomiting.  Denies diarrhea, in fact having some constipation issues.  Reports normal urinary output though it does look a little cloudy today.  She has been drinking fluids well but feels very thirsty.        Allergies:  Allergies   Allergen Reactions    Oxybutynin      Patient reports symptoms of nausea and mind fogginess       Problem List:    Patient Active Problem List    Diagnosis Date Noted    Lightheadedness 09/04/2024     Priority: Medium    Chest pain, unspecified type 09/04/2024     Priority: Medium    Fungal esophagitis 07/26/2024     Priority: Medium    Gastroesophageal reflux disease, unspecified whether esophagitis present 07/26/2024     Priority: Medium    ETOH abuse 07/26/2024     Priority: Medium    Coronary artery disease involving native coronary artery of native heart without angina pectoris 07/09/2024     Priority: Medium    Unstable angina (H) 06/12/2024     Priority: Medium    Carotid artery disease without cerebral infarction (H)  04/25/2024     Priority: Medium    Imbalance 02/08/2024     Priority: Medium    Chemotherapy-induced neuropathy (H) 01/03/2024     Priority: Medium    Unspecified severe protein-calorie malnutrition (H) 01/03/2024     Priority: Medium    Chronic fatigue syndrome 07/07/2023     Priority: Medium    Vision changes 07/07/2023     Priority: Medium    Balance problems 07/07/2023     Priority: Medium    Skin sensation disturbance 07/07/2023     Priority: Medium    Ileostomy status (H) 07/07/2023     Priority: Medium    Acquired hypothyroidism 07/07/2023     Priority: Medium    Hypothyroidism due to medication 07/07/2023     Priority: Medium    Pulmonary nodules 07/07/2023     Priority: Medium    F11.2 - Continuous opioid dependence (H) 07/07/2023     Priority: Medium    Status post ileal conduit (H) 09/12/2022     Priority: Medium    Status of artificial opening of urinary tract (H) 08/19/2022     Priority: Medium    Rheumatoid arthritis of multiple sites with negative rheumatoid factor (H) 02/25/2022     Priority: Medium    Parathyroid abnormality (H) 02/25/2022     Priority: Medium    Hypomagnesemia 11/18/2021     Priority: Medium    Need for prophylactic vaccination and inoculation against influenza 11/18/2021     Priority: Medium    Constipation, unspecified constipation type 09/28/2021     Priority: Medium    Imaging abnormalities 09/28/2021     Priority: Medium    Anemia in neoplastic disease 05/21/2021     Priority: Medium    Chemotherapy-induced neutropenia (H) 05/19/2021     Priority: Medium    Thrombocytopenia (H) 05/19/2021     Priority: Medium    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) 04/07/2021     Priority: Medium     Check 11.21 for f/u Marty  Added automatically from request for surgery [id number]      Malignant neoplasm of overlapping sites of bladder (H) 03/08/2021     Priority: Medium    Personal history of malignant neoplasm of bladder 03/04/2021     Priority: Medium     Added  automatically from request for surgery [id number]      Benign paroxysmal positional vertigo, bilateral 12/24/2020     Priority: Medium    Personal history of malignant neoplasm of breast 10/01/2020     Priority: Medium    Acute cystitis with hematuria 10/01/2020     Priority: Medium    Symptomatic stenosis of left carotid artery 09/22/2020     Priority: Medium     Added automatically from request for surgery [id number]      POSSIBLE Right internal carotid artery aneurysm 09/08/2020     Priority: Medium    Carotid stenosis, bilateral L80%, R40% 09/02/2020     Priority: Medium    Carotid artery plaque, bilateral 09/02/2020     Priority: Medium    Anemia 07/16/2020     Priority: Medium    S/P lumbar fusion 02/04/2020     Priority: Medium    Bilateral impacted cerumen 01/13/2020     Priority: Medium    Stage 3a chronic kidney disease (H) 09/09/2019     Priority: Medium    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      Central stenosis of spinal canal 12/01/2017     Priority: Medium     lumbar        DDD (degenerative disc disease), lumbar 12/01/2017     Priority: Medium    Chronic pain syndrome 11/30/2017     Priority: Medium     substancePatient is followed by Phani Jason PA-C for ongoing prescription of pain medication.  All refills should only be approved by this provider, or covering partner.    Medication(s): Oxycodone IR 5mg.   Maximum quantity per month: 20  Clinic visit frequency required: Q 3 months     Controlled substance agreement:  Encounter-Level CSA:       There are no encounter-level csa.          Pain Clinic evaluation in the past: Yes       Date/Location:      DIRE Total Score(s):  No flowsheet data found.    Last  [facility] website verification:  none   [email or web address]            Lumbar radiculopathy 11/30/2017     Priority: Medium    S/P reverse total shoulder arthroplasty, right 06/05/2017     Priority: Medium    Prophylactic antibiotic for dental procedure indicated due to prior joint replacement 06/05/2017     Priority: Medium    Hyperlipidemia LDL goal <70 05/30/2017     Priority: Medium    Anxiety 03/29/2017     Priority: Medium    S/P bilateral mastectomy 02/08/2017     Priority: Medium    Encounter for antineoplastic chemotherapy 10/07/2016     Priority: Medium    Malignant neoplasm of upper-outer quadrant of left breast in female, estrogen receptor negative (H) 10/06/2016     Priority: Medium    Arthritis of shoulder region, right 11/17/2015     Priority: Medium    Adjustment disorder with depressed mood 11/11/2015     Priority: Medium    Baker's cyst of knee 02/09/2015     Priority: Medium    Patella-femoral syndrome 02/09/2015     Priority: Medium    Osteoarthrosis, hip 02/05/2013     Priority: Medium    Tinnitus of right ear 02/05/2013     Priority: Medium    Benign essential hypertension 10/04/2011     Priority: Medium    Trochanteric bursitis 01/06/2009     Priority: Medium    Family history of stroke (cerebrovascular) 03/07/2008     Priority: Medium    Enthesopathy of hip region 01/30/2006     Priority: Medium        Past Medical History:    Past Medical History:   Diagnosis Date    Acute kidney injury (H)     Carotid stenosis, bilateral L80%, R40% 09/02/2020    Central stenosis of spinal canal 12/01/2017    DDD (degenerative disc disease), lumbar 12/01/2017    Depressive disorder, not elsewhere classified     Esophageal reflux     ETOH abuse     Foraminal stenosis of lumbar region 12/01/2017    Lumbar radiculopathy 11/30/2017    Personal history of malignant neoplasm of breast     Prophylactic antibiotic for dental procedure indicated due to prior joint replacement 06/05/2017    S/P reverse total  shoulder arthroplasty, right 06/05/2017    Subdural hematoma (H)     Thyroid disease     Urothelial carcinoma (H)        Past Surgical History:    Past Surgical History:   Procedure Laterality Date    ARTHROPLASTY SHOULDER Right 11/17/2015    ARTHROSCOPY KNEE  6/7/2012    Procedure:ARTHROSCOPY KNEE; right knee arthroscopy with partial lateral menisectomy; Surgeon:DAMIÁN MCALLISTER; Location:[facility]    BIOPSY VAGINAL N/A 10/27/2021    Procedure: DISTAL URETHRECTOMY;  Surgeon: Leonardo Robles MD;  Location: [facility] OR    COLONOSCOPY N/A 12/22/2017    Procedure: COLONOSCOPY;  colonoscopy;  Surgeon: Chuck Chand MD;  Location: [facility]    CV CORONARY ANGIOGRAM N/A 4/23/2024    Procedure: Coronary Angiogram;  Surgeon: Leonardo Marinelli MD;  Location:  HE[facility] CARDIAC CATH LAB    CV CORONARY ANGIOGRAM N/A 6/13/2024    Procedure: Coronary Angiogram;  Surgeon: Juliette Stevens MD;  Location: [facility] CARDIAC CATH LAB    CV INTRAVASULAR ULTRASOUND N/A 4/23/2024    Procedure: Intravascular Ultrasound;  Surgeon: Leonardo Marinelli MD;  Location: [facility] CARDIAC CATH LAB    CV LEFT HEART CATH N/A 4/23/2024    Procedure: Left Heart Catheterization;  Surgeon: Leonardo Marinelli MD;  Location:  HEART CARDIAC CATH LAB    CV LEFT HEART CATH N/A 6/13/2024    Procedure: Left Heart Catheterization;  Surgeon: Juliette Stevens MD;  Location: [facility] CARDIAC CATH LAB    CV PCI N/A 6/13/2024    Procedure: Percutaneous Coronary Intervention;  Surgeon: Juliette Stevens MD;  Location: [facility] CARDIAC CATH LAB    CV PCI STENT DRUG ELUTING N/A 4/23/2024    Procedure: Percutaneous Coronary Intervention Stent;  Surgeon: Leonardo Marinelli MD;  Location: [facility] CARDIAC CATH LAB    CYSTECTOMY BLADDER RADICAL, ILEAL DIVERSION, COMBINED N/A 6/1/2021    Procedure: RADICAL CYSTECTOMY  WITH ILEAL CONDUIT CREATION,BILATERAL PELVIC LYMPHADENECTOMY;  Surgeon: Leonardo Robles MD;  Location: [facility] OR     CYSTOSCOPY, RETROGRADES, COMBINED Bilateral 3/18/2021    Procedure: CYSTOSCOPY, WITH RETROGRADE PYELOGRAM;  Surgeon: Girish Jack MD;  Location: [facility]    CYSTOSCOPY, TRANSURETHRAL RESECTION (TUR) TUMOR BLADDER, COMBINED N/A 3/18/2021    Procedure: CYSTOSCOPY, WITH TRANSURETHRAL RESECTION BLADDER TUMOR;  Surgeon: Girish Jack MD;  Location: [facility] OR    ENDARTERECTOMY CAROTID Left 10/8/2020    Procedure: LEFT CAROTID ENDARTERECTOMY WITH EEG;  Surgeon: Lacho Jasmine MD;  Location: [facility] OR    ESOPHAGOSCOPY, GASTROSCOPY, DUODENOSCOPY (EGD), COMBINED N/A 6/20/2022    Procedure: ESOPHAGOGASTRODUODENOSCOPY, WITH BIOPSY;  Surgeon: Ramses Arenas MD;  Location: [facility]    EXCISE MASS AXILLA Left 9/16/2016    Procedure: EXCISE MASS AXILLA;  Surgeon: Keyon Blanco MD;  Location: [facility]    HC REMV CATARACT EXTRACAP,INSERT LENS, W/O ECP  6/25/2009    Right eye    INJECT EPIDURAL LUMBAR N/A 4/11/2019    Procedure: interlaminar epidual steroid injection lumbar 4-5;  Surgeon: Oskar Salvador MD;  [facility]    INJECT EPIDURAL LUMBAR Bilateral 9/12/2019    Procedure: lumbar 4-5 epidural interlaminar steroid injection;  Surgeon: Oskar Salvador MD;  [facility]: [facility]    INSERT PORT VASCULAR ACCESS Right 10/7/2016    Procedure: INSERT PORT VASCULAR ACCESS;  Surgeon: Keyon Blanco MD;  Location: [facility]    IR CHEST PORT PLACEMENT > 5 YRS OF AGE  4/16/2021    LASER YAG CAPSULOTOMY Left 11/7/2024    Procedure: Laser YAG capsulotomy;  Surgeon: Jamison Knowles MD;  [facility]: [facility]    MASTECTOMY SIMPLE BILATERAL Bilateral 2/8/2017    Procedure: MASTECTOMY SIMPLE BILATERAL;  Surgeon: Keyon Blanco MD;  [facility]    OPEN REDUCTION INTERNAL FIXATION FOOT Right 3/20/2015    Procedure: OPEN REDUCTION INTERNAL FIXATION FOOT;  Surgeon: Javi Herrmann DPM;  [facility]    OPTICAL TRACKING SYSTEM FUSION SPINE POSTERIOR LUMBAR TWO LEVELS N/A 2/4/2020    Procedure: LUMBAR 2-SACRAL1 TRANSFORMINAL INTERBODY FUSION;   Surgeon: Lenny Gomes MD;  Location: [facility]    REMOVE PORT VASCULAR ACCESS Right 2018    Procedure: REMOVE PORT VASCULAR ACCESS;  right intra jugular port removal;  Surgeon: Keyon Blanco MD;  [facility]: [facility]    SHOULDER SURGERY Right 2015    [facility] LIGATE FALLOPIAN TUBE      [facility] NONSPECIFIC PROCEDURE      ankle fracture surgery       Family History:    Family History   Problem Relation Age of Onset    Hypertension Mother     Thyroid Disease Mother     Cerebrovascular Disease Mother     Hypertension Father     Cerebrovascular Disease Father     Cancer Father         skin    Thyroid Disease Sister     Diabetes Brother         type 2    Depression Sister     Breast Cancer Sister         age 47    Cancer Sister         skin    Cancer Brother         inside nose       Social History:  Marital Status:   [2]  Social History     Tobacco Use    Smoking status: Former     Current packs/day: 0.00     Average packs/day: 3.0 packs/day for 10.0 years (30.0 ttl pk-yrs)     Types: Cigarettes     Start date: 1969     Quit date: 1979     Years since quittin.0     Passive exposure: Never    Smokeless tobacco: Never    Tobacco comments:     approx. 3 packs daily   Vaping Use    Vaping status: Never Used   Substance Use Topics    Alcohol use: Yes     Comment: once in awhile    Drug use: No        Medications:    cefuroxime (CEFTIN) 250 MG tablet  acetaminophen (TYLENOL) 500 MG tablet  amLODIPine (NORVASC) 5 MG tablet  aspirin (ASA) 81 MG chewable tablet  atorvastatin (LIPITOR) 80 MG tablet  carvedilol (COREG) 3.125 MG tablet  clopidogrel (PLAVIX) 75 MG tablet  Cyanocobalamin (B-12 PO)  escitalopram (LEXAPRO) 10 MG tablet  ezetimibe (ZETIA) 10 MG tablet  gabapentin (NEURONTIN) 300 MG capsule  levothyroxine (SYNTHROID/LEVOTHROID) 25 MCG tablet  LORazepam (ATIVAN) 0.5 MG tablet  nitroGLYcerin (NITROSTAT) 0.4 MG sublingual tablet  oxyCODONE (ROXICODONE) 5 MG tablet  pantoprazole (PROTONIX) 40 MG EC  tablet  prochlorperazine (COMPAZINE) 10 MG tablet  senna-docusate (SENEXON-S) 8.6-50 MG tablet          Review of Systems   All other systems reviewed and are negative.          Physical Exam   BP: (!) 142/97  Pulse: 105  Temp: 99.8  F (37.7  C)  Resp: 20  Weight: 64.9 kg (143 lb)  SpO2: 97 %      Physical Exam  Vitals and nursing note reviewed.   Constitutional:       General: She is not in acute distress.     Appearance: Normal appearance. She is well-developed. She is ill-appearing. She is not toxic-appearing or diaphoretic.   HENT:      Head: Normocephalic and atraumatic.      Nose: Nose normal.      Mouth/Throat:      Pharynx: Oropharynx is clear.      Comments: Tacky mucous membranes  Eyes:      Conjunctiva/sclera: Conjunctivae normal.      Pupils: Pupils are equal, round, and reactive to light.   Cardiovascular:      Rate and Rhythm: Regular rhythm. Tachycardia present.      Heart sounds: Normal heart sounds.   Pulmonary:      Effort: Pulmonary effort is normal. No respiratory distress.      Breath sounds: Normal breath sounds. No decreased breath sounds.   Abdominal:      General: Bowel sounds are normal. There is no distension.      Palpations: Abdomen is soft.      Tenderness: There is abdominal tenderness (mild generalized).      Comments: Urinary ostomy in right abdomen with yellow urine present.   Musculoskeletal:         General: No deformity.      Cervical back: Neck supple.   Skin:     General: Skin is warm and dry.   Neurological:      General: No focal deficit present.      Mental Status: She is alert and oriented to person, place, and time. Mental status is at baseline.      Coordination: Coordination normal.   Psychiatric:         Mood and Affect: Mood normal.             ED Course        Procedures              EKG Interpretation:      Interpreted by Geneva Paul PA-C  Time reviewed: 1320  Symptoms at time of EKG: chest heaviness   Rhythm: normal sinus   Rate: Normal  Axis: Normal  Ectopy:  none  Conduction: normal  ST Segments/ T Waves: No acute ischemic changes  Q Waves: none  Comparison to prior: Unchanged    Clinical Impression: no acute changes         Results for orders placed or performed during the hospital encounter of 12/17/24 (from the past 24 hours)   Influenza A/B, RSV and SARS-CoV2 PCR (COVID-19) Nasopharyngeal    Specimen: Nasopharyngeal; Swab   Result Value Ref Range    Influenza A PCR Negative Negative    Influenza B PCR Negative Negative    RSV PCR Negative Negative    SARS CoV2 PCR Negative Negative    Narrative    Testing was performed using the Xpert Xpress CoV2/Flu/RSV Assay on the [employer] Instrument. This test should be ordered for the detection of SARS-CoV2, influenza, and RSV viruses in individuals with signs and symptoms of respiratory tract infection. This test is for in vitro diagnostic use under the [place] FDA for laboratories certified under CLIA to perform high or moderate complexity testing. This test has been [place] FDA cleared. A negative result does not rule out the presence of PCR inhibitors in the specimen or target RNA in concentration below the limit of detection for the assay. If only one viral target is positive but coinfection with multiple targets is suspected, the sample should be re-tested with another FDA cleared, approved, or authorized test, if coninfection would change clinical management. This test was validated by the [facility] [employer] These laboratories are certified under the Clinical Laboratory Improvement Amendments of 1988 (CLIA-88) as qualified to perfom high complexity laboratory testing.   EKG 12-lead, tracing only   Result Value Ref Range    Systolic Blood Pressure  mmHg    Diastolic Blood Pressure  mmHg    Ventricular Rate 97 BPM    Atrial Rate 97 BPM    LA Interval 146 ms    QRS Duration 92 ms     ms    QTc 403 ms    P Axis 68 degrees    R AXIS 56 degrees    T Axis 49 degrees    Interpretation ECG       Sinus  rhythm  Possible Left atrial enlargement  Nonspecific T wave abnormality  Abnormal ECG  When compared with ECG of 19-Aug-2024 10:20, (unconfirmed)  No significant change was found  Confirmed by SEE ED PROVIDER NOTE FOR, ECG INTERPRETATION ([id number]),  Jose Prince ([id number]) on 12/17/2024 2:41:36 PM     CBC with platelets differential    Narrative    The following orders were created for panel order CBC with platelets differential.  Procedure                               Abnormality         Status                     ---------                               -----------         ------                     CBC with platelets and d...[[id number]]  Abnormal            Final result               RBC and Platelet Morphology[[id number]]                                                   Please view results for these tests on the individual orders.   Comprehensive metabolic panel   Result Value Ref Range    Sodium 137 135 - 145 mmol/L    Potassium 3.9 3.4 - 5.3 mmol/L    Carbon Dioxide (CO2) 19 (L) 22 - 29 mmol/L    Anion Gap 17 (H) 7 - 15 mmol/L    Urea Nitrogen 17.7 8.0 - 23.0 mg/dL    Creatinine 0.85 0.51 - 0.95 mg/dL    GFR Estimate 71 >60 mL/min/1.73m2    Calcium 9.9 8.8 - 10.4 mg/dL    Chloride 101 98 - 107 mmol/L    Glucose 89 70 - 99 mg/dL    Alkaline Phosphatase 96 40 - 150 U/L    AST 31 0 - 45 U/L    ALT 25 0 - 50 U/L    Protein Total 7.7 6.4 - 8.3 g/dL    Albumin 4.3 3.5 - 5.2 g/dL    Bilirubin Total 0.5 <=1.2 mg/dL   Lactic acid whole blood with 1x repeat in 2 hr when >2   Result Value Ref Range    Lactic Acid, Initial 1.0 0.7 - 2.0 mmol/L   Procalcitonin   Result Value Ref Range    Procalcitonin 0.71 (H) <0.50 ng/mL   Troponin T, High Sensitivity   Result Value Ref Range    Troponin T, High Sensitivity 26 (H) <=14 ng/L   CRP inflammation   Result Value Ref Range    CRP Inflammation 148.31 (H) <5.00 mg/L   UA with Microscopic reflex to Culture    Specimen: Urostomy; Urine   Result Value Ref Range    Color Urine Light  Yellow Colorless, Straw, Light Yellow, Yellow    Appearance Urine Slightly Cloudy (A) Clear    Glucose Urine Negative Negative mg/dL    Bilirubin Urine Negative Negative    Ketones Urine Trace (A) Negative mg/dL    Specific Gravity Urine 1.016 1.003 - 1.035    Blood Urine Small (A) Negative    pH Urine 6.5 5.0 - 7.0    Protein Albumin Urine 20 (A) Negative mg/dL    Urobilinogen Urine Normal Normal, 2.0 mg/dL    Nitrite Urine Positive (A) Negative    Leukocyte Esterase Urine Large (A) Negative    Bacteria Urine Moderate (A) None Seen /HPF    RBC Urine 7 (H) <=2 /HPF    WBC Urine 38 (H) <=5 /HPF    Narrative    Urine Culture ordered based on laboratory criteria   XR Chest 2 Views    Narrative    CHEST TWO VIEWS  12/17/2024 2:10 PM     HISTORY: Cough. Dyspnea.    COMPARISON: 6/12/2024.      Impression    IMPRESSION: Right Port-A-Cath, tip in the mid SVC. Lungs clear. No  pleural effusions. Right shoulder arthroplasty. Coronary artery  stenting.    JACKSON MAE MD         SYSTEM ID:  [id number]   CBC with platelets and differential   Result Value Ref Range    WBC Count 12.4 (H) 4.0 - 11.0 10e3/uL    RBC Count 3.27 (L) 3.80 - 5.20 10e6/uL    Hemoglobin 10.9 (L) 11.7 - 15.7 g/dL    Hematocrit 32.6 (L) 35.0 - 47.0 %     78 - 100 fL    MCH 33.3 (H) 26.5 - 33.0 pg    MCHC 33.4 31.5 - 36.5 g/dL    RDW 15.1 (H) 10.0 - 15.0 %    Platelet Count 237 150 - 450 10e3/uL    % Neutrophils 80 %    % Lymphocytes 10 %    % Monocytes 9 %    % Eosinophils 1 %    % Basophils 0 %    % Immature Granulocytes 1 %    NRBCs per 100 WBC 0 <1 /100    Absolute Neutrophils 9.9 (H) 1.6 - 8.3 10e3/uL    Absolute Lymphocytes 1.3 0.8 - 5.3 10e3/uL    Absolute Monocytes 1.1 0.0 - 1.3 10e3/uL    Absolute Eosinophils 0.1 0.0 - 0.7 10e3/uL    Absolute Basophils 0.1 0.0 - 0.2 10e3/uL    Absolute Immature Granulocytes 0.1 <=0.4 10e3/uL    Absolute NRBCs 0.0 10e3/uL     *Note: Due to a large number of results and/or encounters for the requested time  period, some results have not been displayed. A complete set of results can be found in Results Review.       Medications   ondansetron (ZOFRAN) injection 4 mg (4 mg Intravenous $Given 12/17/24 1351)   HYDROmorphone (PF) (DILAUDID) injection 0.5 mg (0.5 mg Intravenous $Given 12/17/24 1546)   sodium chloride (PF) 0.9% PF flush 10-20 mL (10 mLs Intracatheter $Given 12/17/24 1631)   heparin lock flush 100 unit/mL injection 5-10 mL (5 mLs Intracatheter $Given 12/17/24 1632)   sodium chloride 0.9% BOLUS 1,000 mL (0 mLs Intravenous Stopped 12/17/24 1450)   cefoTEtan (CEFOTAN) 2 g vial to attach to  ml bag (0 g Intravenous Stopped 12/17/24 1615)         Assessments & Plan (with Medical Decision Making)  Michaela Dorman is a 74 year old female who presented to the emergency department for concerns of cough, congestion, chest pain and shortness of breath.  Also with generalized abdominal discomfort and nausea for the last 2 days.  On arrival to the ED temperature 99.8  F.  Mildly tachycardic.  Hypertensive.  Oxygenation 97% on room air.  She appeared mildly ill but nontoxic.  Cardiopulmonary exam today was overall reassuring, lung sounds were clear throughout.  Abdominal exam demonstrated very mild generalized tenderness with no rebound or guarding.  Symptomology suggest URI.  Does have urinary ileal conduit and has had UTIs in the past presenting with abdominal discomfort and nausea as well.  With her chest heaviness we will get EKG and labs to rule out cardiac component.  Her EKG today was stable with no signs of ischemia.  Her troponin was also stable at 26 which is her baseline, so I do not think symptoms related to cardiac etiology at this time.  White blood cell count today elevated at 12.4 however lactic acid normal.  I do think she has a bacterial infection her CRP was 148 and procalcitonin mildly elevated at 0.71.  Electrolytes were all unremarkable.  Liver enzymes normal.  Urinalysis today demonstrated  positive nitrites, large leukocyte esterase, 38 white cells concerning for UTI.  Chest x-ray was clear today for any signs of pneumonia.  I went over all of these results with the patient and her .  Fortunately no signs of sepsis.  I think her symptoms are multifactorial, likely viral respiratory infection contributing to symptoms of cough and congestion and fever.  Her viral panel was negative today for COVID and influenza.  Suspect other viral etiology.  I think her abdominal pain could be related to URI but also likely secondary to UTI found on labs today.  I discussed treatment options with the patient and she was comfortable with plan to discharge with oral antibiotics but I dose of IV will be given here today.  I reviewed her previous urine culture, which showed multi resistance however sensitive to second-generation cephalosporins so she was given a dose of IV cefotetan here and I will prescribe her cefuroxime for treatment at home going forward.  Culture is pending and patient will receive a call if there is resistance to this antibiotic.  I encouraged over-the-counter cold and flu remedies as needed for her URI symptoms.  She was provided instructions to return to ED for any new or worsening concerns.  All questions answered and patient discharged in stable condition.     I have reviewed the nursing notes.    I have reviewed the findings, diagnosis, plan and need for follow up with the patient.      Discharge Medication List as of 12/17/2024  4:36 PM        START taking these medications    Details   cefuroxime (CEFTIN) 250 MG tablet Take 1 tablet (250 mg) by mouth 2 times daily for 7 days., Disp-14 tablet, R-0, E-Prescribe             Final diagnoses:   Viral URI with cough   Complicated urinary tract infection     Note: Chart documentation done in part with Dragon Voice Recognition software. Although reviewed after completion, some word and grammatical errors may remain.     12/17/2024   [facility]  [facility] EMERGENCY DEPT       Geneva Paul PA-C  12/17/24 [id number]

## 2024-12-17 NOTE — DISCHARGE INSTRUCTIONS
Your urine showed signs of infection so start taking the antibiotics as prescribed.    I think on top of the UTI you also have a viral infection causing congestion and cough.  You can try over-the-counter cold relievers for the symptoms as needed.  There are no signs of a bacterial pneumonia today.    If you develop any new or worsening symptoms please return to the emergency department.    Thank you for choosing [facility]'s Emergency Department. It was a pleasure taking care of you today. If you have any questions, please call [phone].    Edna Figueroa, PAMigueC

## 2024-12-17 NOTE — ED TRIAGE NOTES
She started having abdominal pain and nausea Sunday night and today has cough, chest pain and short of breath with body aches.     Triage Assessment (Adult)       Row Name 12/17/24 1243          Triage Assessment    Airway WDL WDL        Respiratory WDL    Respiratory WDL WDL;X;all     Rhythm/Pattern, Respiratory shortness of breath     Cough Frequency infrequent        Skin Circulation/Temperature WDL    Skin Circulation/Temperature WDL WDL        Cardiac WDL    Cardiac WDL WDL        Peripheral/Neurovascular WDL    Peripheral Neurovascular WDL WDL        Cognitive/Neuro/Behavioral WDL    Cognitive/Neuro/Behavioral WDL WDL

## 2024-12-18 LAB — BACTERIA UR CULT: ABNORMAL

## 2024-12-19 ENCOUNTER — TELEPHONE (OUTPATIENT)
Dept: NURSING | Facility: CLINIC | Age: 74
End: 2024-12-19
Payer: COMMERCIAL

## 2024-12-19 DIAGNOSIS — N39.0 URINARY TRACT INFECTION: Primary | ICD-10-CM

## 2024-12-19 RX ORDER — NITROFURANTOIN 25; 75 MG/1; MG/1
100 CAPSULE ORAL 2 TIMES DAILY
Qty: 14 CAPSULE | Refills: 0 | Status: SHIPPED | OUTPATIENT
Start: 2024-12-19 | End: 2024-12-26

## 2024-12-19 NOTE — LETTER
December 19, 2024        Michaela Dorman  [id number] [address] [address]          Dear Michaela Dorman:    You were seen in the [facility] Emergency Department at [facility] on 12/17/2024.  We are unable to reach you by phone, so we are sending you this letter.     It is important that you call [facility] Emergency Department lab result nurse at [phone], as we have information to relay to you AND/OR we MAY have to make some changes in your treatment.    Best time to call back is between 9AM and 5:30PM, 7 days a week.      Sincerely,     [facility] Emergency Department Lab Result RN  [phone]

## 2024-12-19 NOTE — TELEPHONE ENCOUNTER
[facility]    Reason for call: Lab Result Notification     Lab Result (including Rx patient on, if applicable).  If culture, copy of lab report at bottom.  Lab Result: Urine Culture - See Below    ED Rx: cefuroxime (CEFTIN) 250 MG tablet - Take 1 tablet (250 mg) by mouth 2 times daily for 7 days (RESISTANT)    Creatinine Level (mg/dl)   Creatinine   Date Value Ref Range Status   12/17/2024 0.85 0.51 - 0.95 mg/dL Final   06/24/2021 1.10 (H) 0.52 - 1.04 mg/dL Final    Creatinine clearance (ml/min), if applicable    Serum creatinine: 0.85 mg/dL 12/17/24 1345  Estimated creatinine clearance: 58.6 mL/min     ED Symptoms: Presented to the ED with shortness of breath, cough, congestion, and chest pain. Positive UA.     Current Symptoms: Unable to assess.     12/19/2024 @10:20: Patient calling back. States that her urine looks the same and is drinking lots of water.  Denies any new or worsening symptoms. She states her abdominal pain is gone.     Allergic to ATBs: No  Breastfeeding: No  Pregnant: No  On Coumadin/Warfarin: No  Had any urinary procedures or have urinary   retention, neurogenic bladder, or use a catheter: Yes, patient has a urostomy    RN Recommendations/Instructions per [facility] ED lab result protocol:   [facility] ED lab result protocol utilized: Urine Culture  Advise to discontinue current antibiotic.   Instruct to start antibiotic: Would recommend Nitrofurantoin pending patient assessment.     Unable to reach patient/caregiver.     Left voicemail message requesting a call back to [phone] between 9 a.m. and 5:30 p.m. for patient's ED/UC lab results.    Letter pended to be sent via [employer].       AISHA NIÑO RN

## 2024-12-30 ENCOUNTER — MYC MEDICAL ADVICE (OUTPATIENT)
Dept: FAMILY MEDICINE | Facility: OTHER | Age: 74
End: 2024-12-30
Payer: COMMERCIAL

## 2024-12-30 DIAGNOSIS — F43.21 ADJUSTMENT DISORDER WITH DEPRESSED MOOD: ICD-10-CM

## 2024-12-30 RX ORDER — ESCITALOPRAM OXALATE 10 MG/1
20 TABLET ORAL DAILY
Qty: 180 TABLET | Refills: 0 | Status: SHIPPED | OUTPATIENT
Start: 2024-12-30

## 2024-12-31 DIAGNOSIS — F43.21 ADJUSTMENT DISORDER WITH DEPRESSED MOOD: ICD-10-CM

## 2024-12-31 RX ORDER — LORAZEPAM 0.5 MG/1
0.5 TABLET ORAL EVERY 4 HOURS PRN
Qty: 15 TABLET | Refills: 0 | Status: SHIPPED | OUTPATIENT
Start: 2024-12-31

## 2025-01-02 DIAGNOSIS — I25.10 CAD (CORONARY ARTERY DISEASE): ICD-10-CM

## 2025-01-02 RX ORDER — NITROGLYCERIN 0.4 MG/1
TABLET SUBLINGUAL
Qty: 25 TABLET | Refills: 1 | Status: SHIPPED | OUTPATIENT
Start: 2025-01-02

## 2025-01-05 ENCOUNTER — MYC MEDICAL ADVICE (OUTPATIENT)
Dept: CARDIOLOGY | Facility: CLINIC | Age: 75
End: 2025-01-05
Payer: COMMERCIAL

## 2025-01-06 ENCOUNTER — TRANSCRIBE ORDERS (OUTPATIENT)
Dept: OTHER | Age: 75
End: 2025-01-06

## 2025-01-06 DIAGNOSIS — Z98.61 CAD S/P PERCUTANEOUS CORONARY ANGIOPLASTY: Primary | ICD-10-CM

## 2025-01-06 DIAGNOSIS — I25.10 CAD S/P PERCUTANEOUS CORONARY ANGIOPLASTY: Primary | ICD-10-CM

## 2025-01-08 ENCOUNTER — OFFICE VISIT (OUTPATIENT)
Dept: CARDIOLOGY | Facility: CLINIC | Age: 75
End: 2025-01-08
Payer: COMMERCIAL

## 2025-01-08 VITALS
HEIGHT: 65 IN | OXYGEN SATURATION: 95 % | HEART RATE: 71 BPM | DIASTOLIC BLOOD PRESSURE: 62 MMHG | RESPIRATION RATE: 16 BRPM | WEIGHT: 141 LBS | BODY MASS INDEX: 23.49 KG/M2 | SYSTOLIC BLOOD PRESSURE: 98 MMHG

## 2025-01-08 DIAGNOSIS — I25.10 CORONARY ARTERY DISEASE INVOLVING NATIVE CORONARY ARTERY OF NATIVE HEART WITHOUT ANGINA PECTORIS: ICD-10-CM

## 2025-01-08 ASSESSMENT — PAIN SCALES - GENERAL: PAINLEVEL_OUTOF10: NO PAIN (0)

## 2025-01-08 NOTE — PATIENT INSTRUCTIONS
Thanks for coming into [facility] Heart clinic today.    We discussed: I'm glad you got to the ER and the stents put in!  Call if you're getting hints of chest pain.  To the ER with severe chest pain.      Medication changes:  continue current medications.      Follow up: please get a stress test and see Dr. Aguirre in May.        Please call the clinic at  [phone] with any questions or concerns and my our nurses will be happy to help.    Please call [phone] for scheduling.      Reminder: Please bring in all current medications, over the counter supplements and vitamin bottles to your next appointment.

## 2025-01-08 NOTE — LETTER
2025    Phani Albright PA-C  [address] [facility] [address]    RE: Michaela Dorman       Dear Colleague,     I had the pleasure of seeing Michaela Dorman in the [facility].    Cardiology Clinic Progress Note    Service Date: 2025     Primary Cardiologist: Dr. Aguirre      Reason for Visit: ER, stenting f/u    HPI:   Michaela Dorman is a delightful 74-year-old woman with past medical history significant for the followin.  Hypertension  2.  Hyperlipidemia  3.  History of bladder CA with urostomy  4. History of carotid disease with history of carotid endarterectomy  5.  Coronary artery disease with drug-eluting stent to the OM1 for 99% lesion with 60% stenosis of the proximal circumflex at the bifurcation, and 50% mid LAD lesion .    -Negative IFR of the circumflex and LAD on repeat angiogram   -Status post drug-eluting stent x 1 to the OM1 and drug-eluting stent x 2 to the circumflex as there had been narrowing between her previous stents.  She presented with unstable angina 1/3/2024.    When I last saw Kristi in the fall she had been feeling well, she has not struggled with chest pain or more of for the summer and had near syncope even on small doses of Imdur.  At that point she been feeling great and exercising without difficulty and was to follow-up with Dr. Smith in December.  At that point she was to have a stress test (since she was doing well she did not complete the stress test.  I am unclear why she did not have her visit with Dr. Aguirre.  Unfortunately January 3, she was at the gym doing the NuStep and developed severe chest jaw and arm pain consistent with her previous angina.  She rested and improved she went home and took 1 nitro without syncope and then activated EMS orders include hospitalization with stenting as above.  Echocardiogram showed normal ejection fraction.    She has been home noticed a few days and felt extremely fatigued the first 2 days but is  "starting to feel more like herself.  Her chest pain is completely resolved.  She denies orthopnea or PND.  Home blood pressures have been as low as the 90s systolic, typically in the 100s.  No syncope and right femoral access feels fine.    Physical Exam:  BP 98/62 (BP Location: Right arm, Patient Position: Sitting, Cuff Size: Adult Regular)   Pulse 71   Resp 16   Ht 1.651 m (5' 5\")   Wt 64 kg (141 lb)   SpO2 95%   BMI 23.46 kg/m     Well-developed well-nourished woman in no acute distress.  Is regular rate rhythm without murmur rub or gallop.  Lungs are clear without wheezes rales or rhonchi.  Right femoral access is clean dry intact and well-healed no bruit 2+ right femoral pulse.  No peripheral edema.    Data reviewed:  Coronary angiogram 1/3/2024  Echocardiogram 1/3/2024  Hospital discharge summary  Lipid panel    Assessment and Plan:  1.  Coronary disease with a total of 5 stents placed to the OM and circumflex in the last 1 year most recently 1/3/2024 at [facility].  She remains on aspirin and Plavix and will be on this 1 year in which time she would likely continue Plavix alone.  Fortunately her angina is resolved.  She had had some lesions that had a negative IFR prior to this and we had previously considered stress testing but as she was feeling well we decided not to.  Given her aggressive disease and multiple interventions in the next year will be more cautious.  I will have her get an nuclear stress test, treadmill, and hold her carvedilol with prior to seeing Dr. Aguirre in 4 months.  She will otherwise remain on aspirin and Plavix.    2.  Hypertension well-controlled if blood pressures consistently in the 90 systolic and have her call us and I would decrease amlodipine to 2.5 mg daily.    3.  Hyperlipidemia goal LDL less than 70 closer to 50.  Last lipid panel shows LDL 74 HDL 78 total cholesterol 179 she is on full dose atorvastatin as well as Zetia.  She notes that there is improvement " she can make in her diet which she will do today we will plan on repeating labs in 3 months.    Thank you for allowing me to participate in this delightful patient's care.  She can follow-up with nuclear stress test (even if she does not have symptoms), lipid panel and visit with Dr. Aguirre in May.  She will call sooner with concerns.    Makeda Guadarrama PA-C  [facility]     This note was written using Dragon voice recognition system, please excuse any misspelled names, or nonsensical words and call with any questions.      The longitudinal plan of care for the diagnosis(es)/condition(s) as documented were addressed during this visit. Due to the added complexity in care, I will continue to support Lissa in the subsequent management and with ongoing continuity of care.        Orders this visit:  Orders Placed This Encounter   Procedures     Follow-Up with Cardiology     No orders of the defined types were placed in this encounter.    There are no discontinued medications.  Encounter Diagnosis   Name Primary?     Coronary artery disease involving native coronary artery of native heart without angina pectoris        Current Medications:  Current Outpatient Medications   Medication Sig Dispense Refill     acetaminophen (TYLENOL) 500 MG tablet Take 1,000 mg by mouth 3 times daily as needed for mild pain (500MG X 2 = 1,000MG)       amLODIPine (NORVASC) 5 MG tablet Take 1 tablet (5 mg) by mouth daily. 90 tablet 3     aspirin (ASA) 81 MG chewable tablet Take 1 tablet (81 mg) by mouth daily 100 tablet 3     atorvastatin (LIPITOR) 80 MG tablet Take 80 mg by mouth daily       carvedilol (COREG) 3.125 MG tablet Take 1 tablet (3.125 mg) by mouth 2 times daily (with meals) 180 tablet 3     clopidogrel (PLAVIX) 75 MG tablet Take 1 tablet (75 mg) by mouth daily. 90 tablet 3     Cyanocobalamin (B-12 PO) Take 1 tablet by mouth daily       escitalopram (LEXAPRO) 10 MG tablet Take 2 tablets (20 mg) by mouth daily. 180 tablet  0     ezetimibe (ZETIA) 10 MG tablet Take 1 tablet (10 mg) by mouth daily 90 tablet 3     gabapentin (NEURONTIN) 300 MG capsule Take 2 capsules (600 mg) by mouth 3 times daily. 540 capsule 1     levothyroxine (SYNTHROID/LEVOTHROID) 25 MCG tablet Take 1 tablet (25 mcg) by mouth daily. 90 tablet 1     LORazepam (ATIVAN) 0.5 MG tablet TAKE 1 TABLET (0.5 MG) BY MOUTH EVERY 4 HOURS AS NEEDED FOR ANXIETY, NAUSEA OR SLEEP. 15 tablet 0     nitroGLYcerin (NITROSTAT) 0.4 MG sublingual tablet For chest pain place 1 tablet under the tongue every 5 minutes for 3 doses. If symptoms persist 5 minutes after 2nd dose call 911. 25 tablet 1     oxyCODONE (ROXICODONE) 5 MG tablet TAKE 1 TABLET (5 MG) BY MOUTH EVERY 6 HOURS AS NEEDED FOR MODERATE TO SEVERE PAIN 45 tablet 0     pantoprazole (PROTONIX) 40 MG EC tablet Take 1 tablet (40 mg) by mouth daily 90 tablet 3     prochlorperazine (COMPAZINE) 10 MG tablet TAKE 1/2 TABLET BY MOUTH EVERY 6 HOURS AS NEEDED FORNAUSEA/VOMITING 30 tablet 2     senna-docusate (SENEXON-S) 8.6-50 MG tablet Take 2 tablets by mouth at bedtime 100 tablet 0       Allergies Reviewed and Updated:  Allergies   Allergen Reactions     Oxybutynin      Patient reports symptoms of nausea and mind fogginess       Review of Systems:  Skin:        Eyes:       ENT:       Respiratory:  Negative for shortness of breath, dyspnea on exertion, cough, wheezing  Cardiovascular:  Negative for, palpitations, edema, lightheadedness, dizziness Positive for, chest pain, fatigue  Gastroenterology:      Genitourinary:       Musculoskeletal:       Neurologic:  Negative for headaches, numbness or tingling of hands, numbness or tingling of feet  Psychiatric:       Heme/Lymph/Imm:       Endocrine:          Pertinent Past Medical, Surgical and Family History Reviewed and noted above.     CC  Referred Self, MD  No address on file    Thank you for allowing me to participate in the care of your patient.      Sincerely,     Makeda Guadarrama PA-C      [facility]  cc:   Referred Self, MD  No address on file

## 2025-01-08 NOTE — PROGRESS NOTES
Cardiology Clinic Progress Note    Service Date: 2025     Primary Cardiologist: Dr. Aguirre      Reason for Visit: ER, stenting f/u    HPI:   Michaela Dorman is a delightful 74-year-old woman with past medical history significant for the followin.  Hypertension  2.  Hyperlipidemia  3.  History of bladder CA with urostomy  4. History of carotid disease with history of carotid endarterectomy  5.  Coronary artery disease with drug-eluting stent to the OM1 for 99% lesion with 60% stenosis of the proximal circumflex at the bifurcation, and 50% mid LAD lesion .    -Negative IFR of the circumflex and LAD on repeat angiogram   -Status post drug-eluting stent x 1 to the OM1 and drug-eluting stent x 2 to the circumflex as there had been narrowing between her previous stents.  She presented with unstable angina 1/3/2024.    When I last saw Kristi in the  she had been feeling well, she has not struggled with chest pain or more of for the summer and had near syncope even on small doses of Imdur.  At that point she been feeling great and exercising without difficulty and was to follow-up with Dr. Smith in December.  At that point she was to have a stress test (since she was doing well she did not complete the stress test.  I am unclear why she did not have her visit with Dr. Aguirre.  Unfortunately January 3, she was at the gym doing the NuStep and developed severe chest jaw and arm pain consistent with her previous angina.  She rested and improved she went home and took 1 nitro without syncope and then activated EMS orders include hospitalization with stenting as above.  Echocardiogram showed normal ejection fraction.    She has been home noticed a few days and felt extremely fatigued the first 2 days but is starting to feel more like herself.  Her chest pain is completely resolved.  She denies orthopnea or PND.  Home blood pressures have been as low as the 90s systolic, typically in the 100s.  No syncope and  "right femoral access feels fine.    Physical Exam:  BP 98/62 (BP Location: Right arm, Patient Position: Sitting, Cuff Size: Adult Regular)   Pulse 71   Resp 16   Ht 1.651 m (5' 5\")   Wt 64 kg (141 lb)   SpO2 95%   BMI 23.46 kg/m     Well-developed well-nourished woman in no acute distress.  Is regular rate rhythm without murmur rub or gallop.  Lungs are clear without wheezes rales or rhonchi.  Right femoral access is clean dry intact and well-healed no bruit 2+ right femoral pulse.  No peripheral edema.    Data reviewed:  Coronary angiogram 1/3/2024  Echocardiogram 1/3/2024  Hospital discharge summary  Lipid panel    Assessment and Plan:  1.  Coronary disease with a total of 5 stents placed to the OM and circumflex in the last 1 year most recently 1/3/2024 at [facility].  She remains on aspirin and Plavix and will be on this 1 year in which time she would likely continue Plavix alone.  Fortunately her angina is resolved.  She had had some lesions that had a negative IFR prior to this and we had previously considered stress testing but as she was feeling well we decided not to.  Given her aggressive disease and multiple interventions in the next year will be more cautious.  I will have her get an nuclear stress test, treadmill, and hold her carvedilol with prior to seeing Dr. Aguirre in 4 months.  She will otherwise remain on aspirin and Plavix.    2.  Hypertension well-controlled if blood pressures consistently in the 90 systolic and have her call us and I would decrease amlodipine to 2.5 mg daily.    3.  Hyperlipidemia goal LDL less than 70 closer to 50.  Last lipid panel shows LDL 74 HDL 78 total cholesterol 179 she is on full dose atorvastatin as well as Zetia.  She notes that there is improvement she can make in her diet which she will do today we will plan on repeating labs in 3 months.    Thank you for allowing me to participate in this delightful patient's care.  She can follow-up with nuclear " stress test (even if she does not have symptoms), lipid panel and visit with Dr. Aguirre in May.  She will call sooner with concerns.    Makeda Guadarrama PA-C  [facility]     This note was written using Dragon voice recognition system, please excuse any misspelled names, or nonsensical words and call with any questions.      The longitudinal plan of care for the diagnosis(es)/condition(s) as documented were addressed during this visit. Due to the added complexity in care, I will continue to support Lissa in the subsequent management and with ongoing continuity of care.        Orders this visit:  Orders Placed This Encounter   Procedures    Follow-Up with Cardiology     No orders of the defined types were placed in this encounter.    There are no discontinued medications.  Encounter Diagnosis   Name Primary?    Coronary artery disease involving native coronary artery of native heart without angina pectoris        Current Medications:  Current Outpatient Medications   Medication Sig Dispense Refill    acetaminophen (TYLENOL) 500 MG tablet Take 1,000 mg by mouth 3 times daily as needed for mild pain (500MG X 2 = 1,000MG)      amLODIPine (NORVASC) 5 MG tablet Take 1 tablet (5 mg) by mouth daily. 90 tablet 3    aspirin (ASA) 81 MG chewable tablet Take 1 tablet (81 mg) by mouth daily 100 tablet 3    atorvastatin (LIPITOR) 80 MG tablet Take 80 mg by mouth daily      carvedilol (COREG) 3.125 MG tablet Take 1 tablet (3.125 mg) by mouth 2 times daily (with meals) 180 tablet 3    clopidogrel (PLAVIX) 75 MG tablet Take 1 tablet (75 mg) by mouth daily. 90 tablet 3    Cyanocobalamin (B-12 PO) Take 1 tablet by mouth daily      escitalopram (LEXAPRO) 10 MG tablet Take 2 tablets (20 mg) by mouth daily. 180 tablet 0    ezetimibe (ZETIA) 10 MG tablet Take 1 tablet (10 mg) by mouth daily 90 tablet 3    gabapentin (NEURONTIN) 300 MG capsule Take 2 capsules (600 mg) by mouth 3 times daily. 540 capsule 1    levothyroxine  (SYNTHROID/LEVOTHROID) 25 MCG tablet Take 1 tablet (25 mcg) by mouth daily. 90 tablet 1    LORazepam (ATIVAN) 0.5 MG tablet TAKE 1 TABLET (0.5 MG) BY MOUTH EVERY 4 HOURS AS NEEDED FOR ANXIETY, NAUSEA OR SLEEP. 15 tablet 0    nitroGLYcerin (NITROSTAT) 0.4 MG sublingual tablet For chest pain place 1 tablet under the tongue every 5 minutes for 3 doses. If symptoms persist 5 minutes after 2nd dose call 911. 25 tablet 1    oxyCODONE (ROXICODONE) 5 MG tablet TAKE 1 TABLET (5 MG) BY MOUTH EVERY 6 HOURS AS NEEDED FOR MODERATE TO SEVERE PAIN 45 tablet 0    pantoprazole (PROTONIX) 40 MG EC tablet Take 1 tablet (40 mg) by mouth daily 90 tablet 3    prochlorperazine (COMPAZINE) 10 MG tablet TAKE 1/2 TABLET BY MOUTH EVERY 6 HOURS AS NEEDED FORNAUSEA/VOMITING 30 tablet 2    senna-docusate (SENEXON-S) 8.6-50 MG tablet Take 2 tablets by mouth at bedtime 100 tablet 0       Allergies Reviewed and Updated:  Allergies   Allergen Reactions    Oxybutynin      Patient reports symptoms of nausea and mind fogginess       Review of Systems:  Skin:        Eyes:       ENT:       Respiratory:  Negative for shortness of breath, dyspnea on exertion, cough, wheezing  Cardiovascular:  Negative for, palpitations, edema, lightheadedness, dizziness Positive for, chest pain, fatigue  Gastroenterology:      Genitourinary:       Musculoskeletal:       Neurologic:  Negative for headaches, numbness or tingling of hands, numbness or tingling of feet  Psychiatric:       Heme/Lymph/Imm:       Endocrine:          Pertinent Past Medical, Surgical and Family History Reviewed and noted above.     CC  Referred Self, MD  No address on file

## 2025-01-13 DIAGNOSIS — M51.369 DDD (DEGENERATIVE DISC DISEASE), LUMBAR: ICD-10-CM

## 2025-01-13 DIAGNOSIS — M48.061 FORAMINAL STENOSIS OF LUMBAR REGION: ICD-10-CM

## 2025-01-13 DIAGNOSIS — G89.4 CHRONIC PAIN SYNDROME: ICD-10-CM

## 2025-01-13 DIAGNOSIS — R11.0 NAUSEA: ICD-10-CM

## 2025-01-13 DIAGNOSIS — M54.16 LUMBAR RADICULOPATHY: ICD-10-CM

## 2025-01-13 RX ORDER — OXYCODONE HYDROCHLORIDE 5 MG/1
5 TABLET ORAL EVERY 6 HOURS PRN
Qty: 45 TABLET | Refills: 0 | Status: SHIPPED | OUTPATIENT
Start: 2025-01-13

## 2025-01-13 RX ORDER — PROCHLORPERAZINE MALEATE 10 MG
TABLET ORAL
Qty: 30 TABLET | Refills: 0 | Status: SHIPPED | OUTPATIENT
Start: 2025-01-13

## 2025-01-15 ENCOUNTER — MYC MEDICAL ADVICE (OUTPATIENT)
Dept: CARDIOLOGY | Facility: CLINIC | Age: 75
End: 2025-01-15
Payer: COMMERCIAL

## 2025-01-15 DIAGNOSIS — I10 BENIGN ESSENTIAL HYPERTENSION: ICD-10-CM

## 2025-01-15 NOTE — TELEPHONE ENCOUNTER
"Patient sent an update [employer] message so adding it onto this encounter for provider to also review.     Patient's [employer] message from this afternoon:  \"Just an update. I had alittle episode after I talked to Snehal. I was vacuuming and moving furniture and I started breathing hard and had cramping in.my lower stomach. I laid down...broke out in a sweat and in 15 min. I was fine. I feel ok now. That was about noon. I'm very constipated and took a laxative so thought that might reason for cramping. [place] bp is 98/66 \"  "

## 2025-01-15 NOTE — TELEPHONE ENCOUNTER
Attempted to call patient, no answer. Left voicemail and requested patient call back at [phone].      Snehal Hinson RN   [facility]

## 2025-01-15 NOTE — TELEPHONE ENCOUNTER
Patient returned call. Patient reports that when she woke up this AM she felt lightheaded and weak. BP at 7 am at was 89/53. Patient reports that she felt better after she sat and rested for a bit. Rechecked BP at 9 and it was 114/65 and again at 10 and it was 124/70. Patient currently denies dizziness and weakness. Denies chest pain. Routing to provider to review and advise.   Snehal Hinson RN on 1/15/2025 at 10:01 AM

## 2025-01-16 ENCOUNTER — MYC MEDICAL ADVICE (OUTPATIENT)
Dept: CARDIOLOGY | Facility: CLINIC | Age: 75
End: 2025-01-16
Payer: COMMERCIAL

## 2025-01-16 RX ORDER — AMLODIPINE BESYLATE 2.5 MG/1
2.5 TABLET ORAL DAILY
Qty: 90 TABLET | Refills: 3 | Status: SHIPPED | OUTPATIENT
Start: 2025-01-16

## 2025-01-16 NOTE — TELEPHONE ENCOUNTER
Sent patient [employer] message back with KINA Molina recommendations. Amlodipine dose changed on patient's med list.

## 2025-01-16 NOTE — TELEPHONE ENCOUNTER
Ongoing low bp noted.  Pls have pt decrease amlodipine to 2.5 mg daily.   Makeda Guadarrama PA-C 1/16/2025 1:23 PM

## 2025-01-16 NOTE — TELEPHONE ENCOUNTER
Patient sent another [facility] message update this morning:    Update this morning. When I got up at 6 bp was 119/69. Then I took amlodipine and at 7 bp was 87/53 and I feel very weak and lightheaded. Ty       Patient was last seen by KINA Molina in clinic on 1/8/25. Instead of routing to Dr. Choi will route to KINA Molina to review and give further recommendations.      KINA Molina LOV Note From 1/8/25:  Assessment and Plan:  1.  Coronary disease with a total of 5 stents placed to the OM and circumflex in the last 1 year most recently 1/3/2024 at [facility].  She remains on aspirin and Plavix and will be on this 1 year in which time she would likely continue Plavix alone.  Fortunately her angina is resolved.  She had had some lesions that had a negative IFR prior to this and we had previously considered stress testing but as she was feeling well we decided not to.  Given her aggressive disease and multiple interventions in the next year will be more cautious.  I will have her get an nuclear stress test, treadmill, and hold her carvedilol with prior to seeing Dr. Aguirre in 4 months.  She will otherwise remain on aspirin and Plavix.     2.  Hypertension well-controlled if blood pressures consistently in the 90 systolic and have her call us and I would decrease amlodipine to 2.5 mg daily.     3.  Hyperlipidemia goal LDL less than 70 closer to 50.  Last lipid panel shows LDL 74 HDL 78 total cholesterol 179 she is on full dose atorvastatin as well as Zetia.  She notes that there is improvement she can make in her diet which she will do today we will plan on repeating labs in 3 months.     Thank you for allowing me to participate in this delightful patient's care.  She can follow-up with nuclear stress test (even if she does not have symptoms), lipid panel and visit with Dr. Aguirre in May.  She will call sooner with concerns.     Makeda Guadarrama PA-C  [facility]

## 2025-01-17 ENCOUNTER — MYC MEDICAL ADVICE (OUTPATIENT)
Dept: CARDIOLOGY | Facility: CLINIC | Age: 75
End: 2025-01-17
Payer: COMMERCIAL

## 2025-01-20 DIAGNOSIS — F43.21 ADJUSTMENT DISORDER WITH DEPRESSED MOOD: ICD-10-CM

## 2025-01-20 RX ORDER — LORAZEPAM 0.5 MG/1
0.5 TABLET ORAL EVERY 4 HOURS PRN
Qty: 15 TABLET | Refills: 0 | Status: SHIPPED | OUTPATIENT
Start: 2025-01-20

## 2025-01-20 NOTE — TELEPHONE ENCOUNTER
Agree with stopping amlodipine.  Msg sent to pt. Amlodipine removed from med list.    Makeda Guadarrama PA-C 1/20/2025 5:23 PM

## 2025-01-20 NOTE — TELEPHONE ENCOUNTER
Patient's [employer] message:  Is there any way we could try to lower my dosages of heart meds. I'm so weak and tired all the time. I used to exercise everyday 2 hours now I'm too fatigued and sleep all day. Since my stents in May and now Jan. I feel absolutely horrible. Ty    Last week patient decreased amlodipine from 5mg to 2.5mg daily.     Patient is also on coreg 3.125mg BID    RN sent patient [facility] message asking to confirm medications and asking what her blood pressure readings have been since decreasing the amlodipine.     Patient sent [facility] message reply:  I am taking corig 2x a day. Could I stop amlopidine all together. It makes my bp drop. Pretty low all weekend making me feel light headed and weak. I'm trying to send my bp reading in attachments. It ran in upper 90s over 50s. Before taking amlopidine in the am it's 120/70 usually.     Routing to KINA Molina for further recommendations.

## 2025-01-20 NOTE — TELEPHONE ENCOUNTER
[facility] patient.  reviewed. Due for routine fill. E-prescribed.     Bk Walden PA-C  [facility]

## 2025-01-29 ENCOUNTER — HOSPITAL ENCOUNTER (OUTPATIENT)
Dept: CARDIAC REHAB | Facility: CLINIC | Age: 75
Discharge: HOME | End: 2025-01-29
Attending: STUDENT IN AN ORGANIZED HEALTH CARE EDUCATION/TRAINING PROGRAM
Payer: MEDICARE

## 2025-01-29 PROCEDURE — 93798 PHYS/QHP OP CAR RHAB W/ECG: CPT

## 2025-01-31 ENCOUNTER — HOSPITAL ENCOUNTER (OUTPATIENT)
Dept: CARDIAC REHAB | Facility: CLINIC | Age: 75
Discharge: HOME | End: 2025-01-31
Attending: STUDENT IN AN ORGANIZED HEALTH CARE EDUCATION/TRAINING PROGRAM
Payer: MEDICARE

## 2025-01-31 PROCEDURE — 93798 PHYS/QHP OP CAR RHAB W/ECG: CPT

## 2025-02-03 ENCOUNTER — HOSPITAL ENCOUNTER (OUTPATIENT)
Dept: CARDIAC REHAB | Facility: CLINIC | Age: 75
Discharge: HOME | End: 2025-02-03
Attending: STUDENT IN AN ORGANIZED HEALTH CARE EDUCATION/TRAINING PROGRAM
Payer: MEDICARE

## 2025-02-03 PROCEDURE — 93798 PHYS/QHP OP CAR RHAB W/ECG: CPT

## 2025-02-05 ENCOUNTER — HOSPITAL ENCOUNTER (OUTPATIENT)
Dept: CARDIAC REHAB | Facility: CLINIC | Age: 75
Discharge: HOME | End: 2025-02-05
Attending: STUDENT IN AN ORGANIZED HEALTH CARE EDUCATION/TRAINING PROGRAM
Payer: MEDICARE

## 2025-02-05 PROCEDURE — 93798 PHYS/QHP OP CAR RHAB W/ECG: CPT

## 2025-02-10 ENCOUNTER — HOSPITAL ENCOUNTER (OUTPATIENT)
Dept: CARDIAC REHAB | Facility: CLINIC | Age: 75
Discharge: HOME | End: 2025-02-10
Attending: STUDENT IN AN ORGANIZED HEALTH CARE EDUCATION/TRAINING PROGRAM
Payer: MEDICARE

## 2025-02-10 ENCOUNTER — MYC MEDICAL ADVICE (OUTPATIENT)
Dept: CARDIOLOGY | Facility: CLINIC | Age: 75
End: 2025-02-10
Payer: COMMERCIAL

## 2025-02-10 PROCEDURE — 93798 PHYS/QHP OP CAR RHAB W/ECG: CPT

## 2025-02-12 ENCOUNTER — HOSPITAL ENCOUNTER (OUTPATIENT)
Dept: CARDIAC REHAB | Facility: CLINIC | Age: 75
Discharge: HOME | End: 2025-02-12
Attending: STUDENT IN AN ORGANIZED HEALTH CARE EDUCATION/TRAINING PROGRAM
Payer: MEDICARE

## 2025-02-12 PROCEDURE — 93798 PHYS/QHP OP CAR RHAB W/ECG: CPT

## 2025-02-14 ENCOUNTER — HOSPITAL ENCOUNTER (OUTPATIENT)
Dept: CARDIAC REHAB | Facility: CLINIC | Age: 75
Discharge: HOME | End: 2025-02-14
Attending: STUDENT IN AN ORGANIZED HEALTH CARE EDUCATION/TRAINING PROGRAM
Payer: MEDICARE

## 2025-02-14 PROCEDURE — 93798 PHYS/QHP OP CAR RHAB W/ECG: CPT

## 2025-02-15 DIAGNOSIS — M54.16 LUMBAR RADICULOPATHY: ICD-10-CM

## 2025-02-15 DIAGNOSIS — M51.369 DDD (DEGENERATIVE DISC DISEASE), LUMBAR: ICD-10-CM

## 2025-02-15 DIAGNOSIS — G89.4 CHRONIC PAIN SYNDROME: ICD-10-CM

## 2025-02-15 DIAGNOSIS — M48.061 FORAMINAL STENOSIS OF LUMBAR REGION: ICD-10-CM

## 2025-02-15 NOTE — TELEPHONE ENCOUNTER
The form is signed and in the MA task box (next to the printer) for completion and scan of the document into the medical record.  Electronically signed:    Phani Jason PA-C     [facility]

## 2025-02-17 RX ORDER — OXYCODONE HYDROCHLORIDE 5 MG/1
5 TABLET ORAL EVERY 6 HOURS PRN
Qty: 45 TABLET | Refills: 0 | Status: SHIPPED | OUTPATIENT
Start: 2025-02-17

## 2025-02-17 NOTE — TELEPHONE ENCOUNTER
[facility] patient. Chart reviewed.     reviewed. Due for routine fill. E-prescribed.     Bk La-SANDY Hunt  "[employer]

## 2025-02-19 ENCOUNTER — E-VISIT (OUTPATIENT)
Dept: FAMILY MEDICINE | Facility: OTHER | Age: 75
End: 2025-02-19
Payer: COMMERCIAL

## 2025-02-19 ENCOUNTER — TELEPHONE (OUTPATIENT)
Dept: FAMILY MEDICINE | Facility: OTHER | Age: 75
End: 2025-02-19

## 2025-02-19 DIAGNOSIS — R69 DIAGNOSIS UNKNOWN: Primary | ICD-10-CM

## 2025-02-19 PROCEDURE — 99207 PR NON-BILLABLE SERV PER CHARTING: CPT | Performed by: PHYSICIAN ASSISTANT

## 2025-02-19 NOTE — TELEPHONE ENCOUNTER
Patient had submitted evisit on February 19, 2025 for rash or skin concern along face. Recommend face to face visit for this. Please reach out to the patient to help work her in to be seen. She can use a work with me if she would like, otherwise can look at PCPs schedule next week.     Dennis Hansen PA-C on 2/19/2025 at 3:24 PM

## 2025-02-19 NOTE — PATIENT INSTRUCTIONS
Dear Lissa,    We are sorry you are not feeling well. Based on the responses you provided, it is recommended that you be seen in-person in clinic so we can better evaluate your symptoms. Please schedule this visit in [employer]. You will have a Schedule Now button in [employer] to help with scheduling this appointment. Otherwise, you can call [phone] to schedule an appointment.     You will not be charged for this eVisit. Thank you for trusting us with your care.     Dennis Hansen PA-C

## 2025-02-24 ENCOUNTER — HOSPITAL ENCOUNTER (OUTPATIENT)
Dept: CARDIAC REHAB | Facility: CLINIC | Age: 75
Discharge: HOME | End: 2025-02-24
Attending: STUDENT IN AN ORGANIZED HEALTH CARE EDUCATION/TRAINING PROGRAM
Payer: MEDICARE

## 2025-02-24 DIAGNOSIS — R26.89 BALANCE PROBLEMS: Primary | ICD-10-CM

## 2025-02-24 PROCEDURE — 93798 PHYS/QHP OP CAR RHAB W/ECG: CPT

## 2025-02-26 ENCOUNTER — HOSPITAL ENCOUNTER (OUTPATIENT)
Dept: CARDIAC REHAB | Facility: CLINIC | Age: 75
Discharge: HOME | End: 2025-02-26
Attending: STUDENT IN AN ORGANIZED HEALTH CARE EDUCATION/TRAINING PROGRAM
Payer: MEDICARE

## 2025-02-26 PROCEDURE — 93798 PHYS/QHP OP CAR RHAB W/ECG: CPT

## 2025-02-27 DIAGNOSIS — F43.21 ADJUSTMENT DISORDER WITH DEPRESSED MOOD: ICD-10-CM

## 2025-02-27 RX ORDER — LORAZEPAM 0.5 MG/1
0.5 TABLET ORAL EVERY 4 HOURS PRN
Qty: 15 TABLET | Refills: 0 | Status: SHIPPED | OUTPATIENT
Start: 2025-02-27

## 2025-02-28 ENCOUNTER — HOSPITAL ENCOUNTER (OUTPATIENT)
Dept: CARDIAC REHAB | Facility: CLINIC | Age: 75
Discharge: HOME | End: 2025-02-28
Attending: STUDENT IN AN ORGANIZED HEALTH CARE EDUCATION/TRAINING PROGRAM
Payer: MEDICARE

## 2025-02-28 PROCEDURE — 93798 PHYS/QHP OP CAR RHAB W/ECG: CPT

## 2025-03-18 DIAGNOSIS — G89.4 CHRONIC PAIN SYNDROME: ICD-10-CM

## 2025-03-18 DIAGNOSIS — M54.16 LUMBAR RADICULOPATHY: ICD-10-CM

## 2025-03-18 DIAGNOSIS — M51.369 DDD (DEGENERATIVE DISC DISEASE), LUMBAR: ICD-10-CM

## 2025-03-18 DIAGNOSIS — K21.9 GASTROESOPHAGEAL REFLUX DISEASE, UNSPECIFIED WHETHER ESOPHAGITIS PRESENT: ICD-10-CM

## 2025-03-18 DIAGNOSIS — E03.4 HYPOTHYROIDISM DUE TO ACQUIRED ATROPHY OF THYROID: ICD-10-CM

## 2025-03-18 DIAGNOSIS — M48.061 FORAMINAL STENOSIS OF LUMBAR REGION: ICD-10-CM

## 2025-03-18 RX ORDER — OXYCODONE HYDROCHLORIDE 5 MG/1
5 TABLET ORAL EVERY 6 HOURS PRN
Qty: 45 TABLET | Refills: 0 | Status: SHIPPED | OUTPATIENT
Start: 2025-03-18

## 2025-03-18 RX ORDER — PANTOPRAZOLE SODIUM 40 MG/1
40 TABLET, DELAYED RELEASE ORAL DAILY
Qty: 90 TABLET | Refills: 2 | Status: SHIPPED | OUTPATIENT
Start: 2025-03-18

## 2025-03-18 RX ORDER — LEVOTHYROXINE SODIUM 25 UG/1
25 TABLET ORAL DAILY
Qty: 90 TABLET | Refills: 1 | OUTPATIENT
Start: 2025-03-18

## 2025-03-27 ENCOUNTER — MEDICAL CORRESPONDENCE (OUTPATIENT)
Dept: HEALTH INFORMATION MANAGEMENT | Facility: CLINIC | Age: 75
End: 2025-03-27
Payer: COMMERCIAL

## 2025-03-27 DIAGNOSIS — F43.21 ADJUSTMENT DISORDER WITH DEPRESSED MOOD: ICD-10-CM

## 2025-03-27 RX ORDER — LORAZEPAM 0.5 MG/1
0.5 TABLET ORAL EVERY 4 HOURS PRN
Qty: 15 TABLET | Refills: 0 | Status: SHIPPED | OUTPATIENT
Start: 2025-03-27

## 2025-03-28 ENCOUNTER — TELEPHONE (OUTPATIENT)
Dept: FAMILY MEDICINE | Facility: OTHER | Age: 75
End: 2025-03-28
Payer: COMMERCIAL

## 2025-03-28 NOTE — TELEPHONE ENCOUNTER
INCOMING FORMS    Sender:  Handi    Type of Form, letter or note (What is requested?): order    How was the form received?: Fax    How should forms be returned?:  Fax : [phone]    Form placed in [facility] bin for review/signature if appropriate.

## 2025-04-04 ENCOUNTER — APPOINTMENT (OUTPATIENT)
Dept: CT IMAGING | Facility: CLINIC | Age: 75
End: 2025-04-04
Attending: EMERGENCY MEDICINE
Payer: MEDICARE

## 2025-04-04 ENCOUNTER — HOSPITAL ENCOUNTER (EMERGENCY)
Facility: CLINIC | Age: 75
Discharge: HOME | End: 2025-04-04
Attending: EMERGENCY MEDICINE | Admitting: EMERGENCY MEDICINE
Payer: MEDICARE

## 2025-04-04 VITALS
BODY MASS INDEX: 23.16 KG/M2 | TEMPERATURE: 97.7 F | HEART RATE: 65 BPM | OXYGEN SATURATION: 97 % | HEIGHT: 65 IN | WEIGHT: 139 LBS | DIASTOLIC BLOOD PRESSURE: 86 MMHG | SYSTOLIC BLOOD PRESSURE: 134 MMHG | RESPIRATION RATE: 18 BRPM

## 2025-04-04 DIAGNOSIS — R10.31 RIGHT GROIN PAIN: ICD-10-CM

## 2025-04-04 DIAGNOSIS — R07.9 CHEST PAIN, UNSPECIFIED TYPE: ICD-10-CM

## 2025-04-04 LAB
ALBUMIN SERPL BCG-MCNC: 4.6 G/DL (ref 3.5–5.2)
ALBUMIN UR-MCNC: NEGATIVE MG/DL
ALP SERPL-CCNC: 74 U/L (ref 40–150)
ALT SERPL W P-5'-P-CCNC: 52 U/L (ref 0–50)
ANION GAP SERPL CALCULATED.3IONS-SCNC: 12 MMOL/L (ref 7–15)
APPEARANCE UR: CLEAR
AST SERPL W P-5'-P-CCNC: 61 U/L (ref 0–45)
ATRIAL RATE - MUSE: 65 BPM
BASOPHILS # BLD AUTO: 0.1 10E3/UL (ref 0–0.2)
BASOPHILS NFR BLD AUTO: 1 %
BILIRUB SERPL-MCNC: 0.4 MG/DL
BILIRUB UR QL STRIP: NEGATIVE
BUN SERPL-MCNC: 35.1 MG/DL (ref 8–23)
CALCIUM SERPL-MCNC: 10.4 MG/DL (ref 8.8–10.4)
CHLORIDE SERPL-SCNC: 106 MMOL/L (ref 98–107)
COLOR UR AUTO: ABNORMAL
CREAT SERPL-MCNC: 1.12 MG/DL (ref 0.51–0.95)
D DIMER PPP FEU-MCNC: 0.82 UG/ML FEU (ref 0–0.5)
DIASTOLIC BLOOD PRESSURE - MUSE: NORMAL MMHG
EGFRCR SERPLBLD CKD-EPI 2021: 51 ML/MIN/1.73M2
EOSINOPHIL # BLD AUTO: 0.3 10E3/UL (ref 0–0.7)
EOSINOPHIL NFR BLD AUTO: 4 %
ERYTHROCYTE [DISTWIDTH] IN BLOOD BY AUTOMATED COUNT: 14 % (ref 10–15)
GLUCOSE SERPL-MCNC: 92 MG/DL (ref 70–99)
GLUCOSE UR STRIP-MCNC: NEGATIVE MG/DL
HCO3 SERPL-SCNC: 20 MMOL/L (ref 22–29)
HCT VFR BLD AUTO: 35.4 % (ref 35–47)
HGB BLD-MCNC: 11.8 G/DL (ref 11.7–15.7)
HGB UR QL STRIP: ABNORMAL
HOLD SPECIMEN: NORMAL
IMM GRANULOCYTES # BLD: 0 10E3/UL
IMM GRANULOCYTES NFR BLD: 0 %
INTERPRETATION ECG - MUSE: NORMAL
KETONES UR STRIP-MCNC: NEGATIVE MG/DL
LEUKOCYTE ESTERASE UR QL STRIP: ABNORMAL
LYMPHOCYTES # BLD AUTO: 2.2 10E3/UL (ref 0.8–5.3)
LYMPHOCYTES NFR BLD AUTO: 31 %
MCH RBC QN AUTO: 33.1 PG (ref 26.5–33)
MCHC RBC AUTO-ENTMCNC: 33.3 G/DL (ref 31.5–36.5)
MCV RBC AUTO: 99 FL (ref 78–100)
MONOCYTES # BLD AUTO: 0.6 10E3/UL (ref 0–1.3)
MONOCYTES NFR BLD AUTO: 9 %
NEUTROPHILS # BLD AUTO: 3.9 10E3/UL (ref 1.6–8.3)
NEUTROPHILS NFR BLD AUTO: 55 %
NITRATE UR QL: NEGATIVE
NRBC # BLD AUTO: 0 10E3/UL
NRBC BLD AUTO-RTO: 0 /100
P AXIS - MUSE: 72 DEGREES
PH UR STRIP: 6 [PH] (ref 5–7)
PLATELET # BLD AUTO: 249 10E3/UL (ref 150–450)
POTASSIUM SERPL-SCNC: 5 MMOL/L (ref 3.4–5.3)
PR INTERVAL - MUSE: 156 MS
PROT SERPL-MCNC: 7.2 G/DL (ref 6.4–8.3)
QRS DURATION - MUSE: 90 MS
QT - MUSE: 382 MS
QTC - MUSE: 397 MS
R AXIS - MUSE: 52 DEGREES
RBC # BLD AUTO: 3.57 10E6/UL (ref 3.8–5.2)
RBC URINE: 6 /HPF
SODIUM SERPL-SCNC: 138 MMOL/L (ref 135–145)
SP GR UR STRIP: 1.03 (ref 1–1.03)
SYSTOLIC BLOOD PRESSURE - MUSE: NORMAL MMHG
T AXIS - MUSE: 59 DEGREES
TROPONIN T SERPL HS-MCNC: 28 NG/L
TROPONIN T SERPL HS-MCNC: 31 NG/L
UROBILINOGEN UR STRIP-MCNC: NORMAL MG/DL
VENTRICULAR RATE- MUSE: 65 BPM
WBC # BLD AUTO: 7.1 10E3/UL (ref 4–11)
WBC URINE: 4 /HPF

## 2025-04-04 PROCEDURE — 87088 URINE BACTERIA CULTURE: CPT | Performed by: EMERGENCY MEDICINE

## 2025-04-04 PROCEDURE — 99285 EMERGENCY DEPT VISIT HI MDM: CPT | Performed by: EMERGENCY MEDICINE

## 2025-04-04 PROCEDURE — 250N000009 HC RX 250: Performed by: EMERGENCY MEDICINE

## 2025-04-04 PROCEDURE — 99285 EMERGENCY DEPT VISIT HI MDM: CPT | Mod: 25 | Performed by: EMERGENCY MEDICINE

## 2025-04-04 PROCEDURE — 250N000011 HC RX IP 250 OP 636: Mod: JZ | Performed by: EMERGENCY MEDICINE

## 2025-04-04 PROCEDURE — 72131 CT LUMBAR SPINE W/O DYE: CPT

## 2025-04-04 PROCEDURE — 258N000003 HC RX IP 258 OP 636: Performed by: EMERGENCY MEDICINE

## 2025-04-04 PROCEDURE — 93010 ELECTROCARDIOGRAM REPORT: CPT | Performed by: EMERGENCY MEDICINE

## 2025-04-04 PROCEDURE — 36415 COLL VENOUS BLD VENIPUNCTURE: CPT | Performed by: EMERGENCY MEDICINE

## 2025-04-04 PROCEDURE — 71275 CT ANGIOGRAPHY CHEST: CPT

## 2025-04-04 PROCEDURE — 74177 CT ABD & PELVIS W/CONTRAST: CPT

## 2025-04-04 PROCEDURE — 250N000011 HC RX IP 250 OP 636: Performed by: EMERGENCY MEDICINE

## 2025-04-04 PROCEDURE — 85041 AUTOMATED RBC COUNT: CPT | Performed by: EMERGENCY MEDICINE

## 2025-04-04 PROCEDURE — 80053 COMPREHEN METABOLIC PANEL: CPT | Performed by: EMERGENCY MEDICINE

## 2025-04-04 PROCEDURE — 84484 ASSAY OF TROPONIN QUANT: CPT | Performed by: EMERGENCY MEDICINE

## 2025-04-04 PROCEDURE — 87186 SC STD MICRODIL/AGAR DIL: CPT | Performed by: EMERGENCY MEDICINE

## 2025-04-04 PROCEDURE — 93005 ELECTROCARDIOGRAM TRACING: CPT | Performed by: EMERGENCY MEDICINE

## 2025-04-04 PROCEDURE — 85004 AUTOMATED DIFF WBC COUNT: CPT | Performed by: EMERGENCY MEDICINE

## 2025-04-04 PROCEDURE — 96374 THER/PROPH/DIAG INJ IV PUSH: CPT | Mod: 59 | Performed by: EMERGENCY MEDICINE

## 2025-04-04 PROCEDURE — 81001 URINALYSIS AUTO W/SCOPE: CPT | Performed by: EMERGENCY MEDICINE

## 2025-04-04 PROCEDURE — 85379 FIBRIN DEGRADATION QUANT: CPT | Performed by: EMERGENCY MEDICINE

## 2025-04-04 PROCEDURE — 96361 HYDRATE IV INFUSION ADD-ON: CPT | Performed by: EMERGENCY MEDICINE

## 2025-04-04 RX ORDER — METHYLPREDNISOLONE 4 MG/1
TABLET ORAL
Qty: 21 TABLET | Refills: 0 | Status: SHIPPED | OUTPATIENT
Start: 2025-04-04

## 2025-04-04 RX ORDER — ESCITALOPRAM OXALATE 20 MG/1
20 TABLET ORAL DAILY
COMMUNITY
End: 2025-04-07

## 2025-04-04 RX ORDER — HYDROMORPHONE HYDROCHLORIDE 2 MG/1
2 TABLET ORAL EVERY 6 HOURS PRN
Qty: 10 TABLET | Refills: 0 | Status: SHIPPED | OUTPATIENT
Start: 2025-04-04 | End: 2025-04-07

## 2025-04-04 RX ORDER — AMLODIPINE BESYLATE 2.5 MG/1
2.5 TABLET ORAL DAILY
COMMUNITY

## 2025-04-04 RX ORDER — HYDROMORPHONE HYDROCHLORIDE 1 MG/ML
0.5 INJECTION, SOLUTION INTRAMUSCULAR; INTRAVENOUS; SUBCUTANEOUS ONCE
Status: COMPLETED | OUTPATIENT
Start: 2025-04-04 | End: 2025-04-04

## 2025-04-04 RX ORDER — OXYCODONE HYDROCHLORIDE 5 MG/1
5 TABLET ORAL EVERY 6 HOURS PRN
COMMUNITY

## 2025-04-04 RX ORDER — IOPAMIDOL 755 MG/ML
500 INJECTION, SOLUTION INTRAVASCULAR ONCE
Status: COMPLETED | OUTPATIENT
Start: 2025-04-04 | End: 2025-04-04

## 2025-04-04 RX ADMIN — HYDROMORPHONE HYDROCHLORIDE 0.5 MG: 0.5 INJECTION, SOLUTION INTRAMUSCULAR; INTRAVENOUS; SUBCUTANEOUS at 15:17

## 2025-04-04 RX ADMIN — SODIUM CHLORIDE 1000 ML: 9 INJECTION, SOLUTION INTRAVENOUS at 13:58

## 2025-04-04 RX ADMIN — IOPAMIDOL 75 ML: 755 INJECTION, SOLUTION INTRAVENOUS at 13:03

## 2025-04-04 RX ADMIN — SODIUM CHLORIDE 60 ML: 9 INJECTION, SOLUTION INTRAVENOUS at 13:02

## 2025-04-04 ASSESSMENT — ACTIVITIES OF DAILY LIVING (ADL)
ADLS_ACUITY_SCORE: 58

## 2025-04-04 ASSESSMENT — COLUMBIA-SUICIDE SEVERITY RATING SCALE - C-SSRS
1. IN THE PAST MONTH, HAVE YOU WISHED YOU WERE DEAD OR WISHED YOU COULD GO TO SLEEP AND NOT WAKE UP?: NO
2. HAVE YOU ACTUALLY HAD ANY THOUGHTS OF KILLING YOURSELF IN THE PAST MONTH?: NO
6. HAVE YOU EVER DONE ANYTHING, STARTED TO DO ANYTHING, OR PREPARED TO DO ANYTHING TO END YOUR LIFE?: NO

## 2025-04-04 NOTE — ED TRIAGE NOTES
Patient reports right groin and leg pain for a week along with chest pain since yesterday that comes and goes.

## 2025-04-04 NOTE — DISCHARGE INSTRUCTIONS
Continue to monitor your chest pain and talk with your cardiology group about your ED visit.    You can try lidocaine patch or gel over the area of discomfort in your groin.    Dilaudid if needed for severe pain.  No driving while on this medication.  It can cause constipation so take stool softeners if needed.    I am starting you on a Medrol Dosepak.  This is a steroid to decrease pain and inflammation.  Take with food or milk.  Take as prescribed.    I will let your primary care provider know about your ED visit.    If you have any significant worsening, changes or concerns return at any time.    I hope you improve quickly and have a good weekend!!

## 2025-04-04 NOTE — MEDICATION SCRIBE - ADMISSION MEDICATION HISTORY
Medication Scribe Admission Medication History    Admission medication history is complete. The information provided in this note is only as accurate as the sources available at the time of the update.    Information Source(s): Patient and CareEverywhere/SureScripts via in-person    Pertinent Information: Verified no use of pain pump, inhalers or prescription eye drops currently.  Spouse Yefri present.    Changes made to PTA medication list:  Added: amlodipine  Deleted: B12, triamcinolone crm  Changed:oxycodone regimen from prn to every morning scheduled and prn, lexapro strength from 10mg to 20mg (not a dose change), senna+doc to prn    Allergies reviewed with patient and updates made in EHR: yes    Medication History Completed By: CELSO GARCIA 4/4/2025 2:37 PM    PTA Med List   Medication Sig Note Last Dose/Taking    acetaminophen (TYLENOL) 500 MG tablet Take 1,000 mg by mouth 3 times daily as needed for mild pain (500MG X 2 = 1,000MG)  4/4/2025 at 10:00 AM    amLODIPine (NORVASC) 2.5 MG tablet Take 2.5 mg by mouth daily.  4/4/2025 at  6:00 AM    aspirin (ASA) 81 MG chewable tablet Take 1 tablet (81 mg) by mouth daily  4/4/2025 at  6:00 AM    atorvastatin (LIPITOR) 80 MG tablet Take 80 mg by mouth daily  4/4/2025 at  6:00 AM    carvedilol (COREG) 3.125 MG tablet Take 1 tablet (3.125 mg) by mouth 2 times daily (with meals)  4/4/2025 at  6:00 AM    clopidogrel (PLAVIX) 75 MG tablet Take 1 tablet (75 mg) by mouth daily.  4/4/2025 at  6:00 AM    escitalopram (LEXAPRO) 20 MG tablet Take 20 mg by mouth daily.  4/4/2025 at  6:00 AM    ezetimibe (ZETIA) 10 MG tablet Take 1 tablet (10 mg) by mouth daily  4/2/2025 Morning    gabapentin (NEURONTIN) 300 MG capsule Take 2 capsules (600 mg) by mouth 3 times daily.  4/4/2025 at  6:00 AM    levothyroxine (SYNTHROID/LEVOTHROID) 25 MCG tablet Take 1 tablet (25 mcg) by mouth daily.  4/4/2025 at  6:00 AM    LORazepam (ATIVAN) 0.5 MG tablet Take 1 tablet (0.5 mg) by mouth every 4  hours as needed for anxiety, nausea or sleep.  4/3/2025 Morning    nitroGLYcerin (NITROSTAT) 0.4 MG sublingual tablet For chest pain place 1 tablet under the tongue every 5 minutes for 3 doses. If symptoms persist 5 minutes after 2nd dose call 911. 4/4/2025: Last used 04/03/2025 4/3/2025 Morning    oxyCODONE (ROXICODONE) 5 MG tablet Take 5 mg by mouth every 6 hours as needed for severe pain.  4/3/2025 at  2:00 PM    oxyCODONE (ROXICODONE) 5 MG tablet Take 1 tablet (5 mg) by mouth every 6 hours as needed for moderate to severe pain. (Patient taking differently: Take 5 mg by mouth every morning.)  4/4/2025 at  6:00 AM    pantoprazole (PROTONIX) 40 MG EC tablet Take 1 tablet (40 mg) by mouth daily.  4/4/2025 at  6:00 AM    prochlorperazine (COMPAZINE) 10 MG tablet TAKE 1/2 TABLET BY MOUTH EVERY 6 HOURS AS NEEDED FOR NAUSEA/VOMITING (Patient taking differently: Take 5 mg by mouth every 6 hours as needed for nausea or vomiting.)  4/4/2025 at  6:00 AM    senna-docusate (SENEXON-S) 8.6-50 MG tablet Take 2 tablets by mouth at bedtime (Patient taking differently: Take 2 tablets by mouth nightly as needed for constipation.)  4/2/2025 Bedtime

## 2025-04-05 ENCOUNTER — TELEPHONE (OUTPATIENT)
Dept: NURSING | Facility: CLINIC | Age: 75
End: 2025-04-05
Payer: COMMERCIAL

## 2025-04-05 DIAGNOSIS — N39.0 URINARY TRACT INFECTION: Primary | ICD-10-CM

## 2025-04-05 NOTE — TELEPHONE ENCOUNTER
[facility]    Reason for call: Lab Result Notification     Lab Result (including Rx patient on, if applicable).  If culture, copy of lab report at bottom.  Lab Result: preliminary urine culture  No antibiotic prescribed    Creatinine Level (mg/dl)   Creatinine   Date Value Ref Range Status   04/04/2025 1.12 (H) 0.51 - 0.95 mg/dL Final   06/24/2021 1.10 (H) 0.52 - 1.04 mg/dL Final    Creatinine clearance (ml/min), if applicable    Serum creatinine: 1.12 mg/dL (H) 04/04/25 1141  Estimated creatinine clearance: 43.2 mL/min (A)     Patient's current Symptoms:   Lissa denies urinary symptoms, reports she has a urostomy and it's hard to tell if she has a UTI, she prefers to wait for the final result to decide treatment. She is afebrile, no further chest pain and she is tolerating fluids.  ED symptoms= chest pain Hx of bladder and breast cancer  RN Recommendations/Instructions per [place] ED lab result protocol:   [facility] ED lab result protocol utilized: urine culture    Patient/care giver notified to contact your PCP clinic or return to the Emergency department if your:  Symptoms return.    Symptoms worsen or other concerning symptoms.    Arabella Leos, RN

## 2025-04-05 NOTE — ED PROVIDER NOTES
"  History     Chief Complaint   Patient presents with    Chest Pain    Groin Pain     HPI  History per patient, medical records    This is a 74-year-old female, history of breast cancer status post bilateral mastectomy, radiation, chemotherapy, bladder cancer status post resection with urostomy, hypertension, hyperlipidemia, coronary artery disease status post stenting, GERD, chronic pain, CKD, lumbar fusion, other history as below presenting with chest and groin pain.  Is status post cardiac stenting in May 2020 4 x 2 in January 6 2025 x 3.  Yesterday morning she woke at 4 AM with pressure around her entire chest to abdomen.  She took a couple of Tums without relief.  She took 1 nitroglycerin and noted complete improvement of the pain after about 30 minutes.  She was able to be active throughout the day, eating normally, took care of her horse.  This morning at 4 AM she had symptoms again starting in the epigastric area up into her chest.  The symptoms went away on their own after about 30 minutes.  No headache, fevers, chills, cough, nausea, vomiting, diarrhea.  Both times patient woke lying on her right side.  Patient has had the symptoms of \"neuropathy all over\".  She feels very warm.  She had some charley horses in her legs this morning.  She took 3 aspirin yesterday.    Her other concern is for 4 days of right groin pain.  Pain occurs when she stands and walks.  It starts in her right groin with radiation into her low back and down to her knee.  She has history of lumbar fusion over 10 years ago.  She denies any falls or known trauma.  She does not have pain at rest.  Patient has a urostomy bag and has had normal urine.  No saddle anesthesia.  No weakness.    Allergies:  Allergies   Allergen Reactions    Oxybutynin      Patient reports symptoms of nausea and mind fogginess       Problem List:    Patient Active Problem List    Diagnosis Date Noted    Lightheadedness 09/04/2024     Priority: Medium    Chest " pain, unspecified type 09/04/2024     Priority: Medium    Fungal esophagitis 07/26/2024     Priority: Medium    Gastroesophageal reflux disease, unspecified whether esophagitis present 07/26/2024     Priority: Medium    ETOH abuse 07/26/2024     Priority: Medium    Coronary artery disease involving native coronary artery of native heart without angina pectoris 07/09/2024     Priority: Medium    Unstable angina (H) 06/12/2024     Priority: Medium    Carotid artery disease without cerebral infarction 04/25/2024     Priority: Medium    Imbalance 02/08/2024     Priority: Medium    Chemotherapy-induced neuropathy 01/03/2024     Priority: Medium    Unspecified severe protein-calorie malnutrition 01/03/2024     Priority: Medium    Chronic fatigue syndrome 07/07/2023     Priority: Medium    Vision changes 07/07/2023     Priority: Medium    Balance problems 07/07/2023     Priority: Medium    Skin sensation disturbance 07/07/2023     Priority: Medium    Ileostomy status (H) 07/07/2023     Priority: Medium    Acquired hypothyroidism 07/07/2023     Priority: Medium    Hypothyroidism due to medication 07/07/2023     Priority: Medium    Pulmonary nodules 07/07/2023     Priority: Medium    F11.2 - Continuous opioid dependence (H) 07/07/2023     Priority: Medium    Status post ileal conduit (H) 09/12/2022     Priority: Medium    Status of artificial opening of urinary tract (H) 08/19/2022     Priority: Medium    Rheumatoid arthritis of multiple sites with negative rheumatoid factor (H) 02/25/2022     Priority: Medium    Parathyroid abnormality 02/25/2022     Priority: Medium    Hypomagnesemia 11/18/2021     Priority: Medium    Need for prophylactic vaccination and inoculation against influenza 11/18/2021     Priority: Medium    Constipation, unspecified constipation type 09/28/2021     Priority: Medium    Imaging abnormalities 09/28/2021     Priority: Medium    Anemia in neoplastic disease 05/21/2021     Priority: Medium     Chemotherapy-induced neutropenia 05/19/2021     Priority: Medium    Thrombocytopenia 05/19/2021     Priority: Medium    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) 04/07/2021     Priority: Medium     Check 11.21 for f/u Marty  Added automatically from request for surgery [id number]      Malignant neoplasm of overlapping sites of bladder (H) 03/08/2021     Priority: Medium    Personal history of malignant neoplasm of bladder 03/04/2021     Priority: Medium     Added automatically from request for surgery [id number]      Benign paroxysmal positional vertigo, bilateral 12/24/2020     Priority: Medium    Personal history of malignant neoplasm of breast 10/01/2020     Priority: Medium    Acute cystitis with hematuria 10/01/2020     Priority: Medium    Symptomatic stenosis of left carotid artery 09/22/2020     Priority: Medium     Added automatically from request for surgery [id number]      POSSIBLE Right internal carotid artery aneurysm 09/08/2020     Priority: Medium    Carotid stenosis, bilateral L80%, R40% 09/02/2020     Priority: Medium    Carotid artery plaque, bilateral 09/02/2020     Priority: Medium    Anemia 07/16/2020     Priority: Medium    S/P lumbar fusion 02/04/2020     Priority: Medium    Bilateral impacted cerumen 01/13/2020     Priority: Medium    Stage 3a chronic kidney disease (H) 09/09/2019     Priority: Medium    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      Central stenosis of spinal canal 12/01/2017     Priority: Medium     lumbar        DDD (degenerative disc disease), lumbar 12/01/2017     Priority: Medium    Chronic pain syndrome 11/30/2017     Priority: Medium     substancePatient is followed by  Phani Jason PA-C for ongoing prescription of pain medication.  All refills should only be approved by this provider, or covering partner.    Medication(s): Oxycodone IR 5mg.   Maximum quantity per month: 20  Clinic visit frequency required: Q 3 months     Controlled substance agreement:  Encounter-Level CSA:       There are no encounter-level csa.          Pain Clinic evaluation in the past: Yes       Date/Location:      DIRE Total Score(s):  No flowsheet data found.    Last [facility] website verification:  none   [email or web address]            Lumbar radiculopathy 11/30/2017     Priority: Medium    S/P reverse total shoulder arthroplasty, right 06/05/2017     Priority: Medium    Prophylactic antibiotic for dental procedure indicated due to prior joint replacement 06/05/2017     Priority: Medium    Hyperlipidemia LDL goal <70 05/30/2017     Priority: Medium    Anxiety 03/29/2017     Priority: Medium    S/P bilateral mastectomy 02/08/2017     Priority: Medium    Encounter for antineoplastic chemotherapy 10/07/2016     Priority: Medium    Malignant neoplasm of upper-outer quadrant of left breast in female, estrogen receptor negative (H) 10/06/2016     Priority: Medium    Arthritis of shoulder region, right 11/17/2015     Priority: Medium    Adjustment disorder with depressed mood 11/11/2015     Priority: Medium    Baker's cyst of knee 02/09/2015     Priority: Medium    Patella-femoral syndrome 02/09/2015     Priority: Medium    Osteoarthrosis, hip 02/05/2013     Priority: Medium    Tinnitus of right ear 02/05/2013     Priority: Medium    Benign essential hypertension 10/04/2011     Priority: Medium    Trochanteric bursitis 01/06/2009     Priority: Medium    Family history of stroke (cerebrovascular) 03/07/2008     Priority: Medium    Enthesopathy of hip region 01/30/2006     Priority: Medium        Past Medical History:    Past Medical History:   Diagnosis Date    Acute kidney injury     Carotid stenosis,  bilateral L80%, R40% 09/02/2020    Central stenosis of spinal canal 12/01/2017    DDD (degenerative disc disease), lumbar 12/01/2017    Depressive disorder, not elsewhere classified     Esophageal reflux     ETOH abuse     Foraminal stenosis of lumbar region 12/01/2017    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    Subdural hematoma (H)     Thyroid disease     Urothelial carcinoma (H)        Past Surgical History:    Past Surgical History:   Procedure Laterality Date    ARTHROPLASTY SHOULDER Right 11/17/2015    ARTHROSCOPY KNEE  6/7/2012    Procedure:ARTHROSCOPY KNEE; right knee arthroscopy with partial lateral menisectomy; Surgeon:DAMIÁN MCALLISTER; Location:[facility]    BIOPSY VAGINAL N/A 10/27/2021    Procedure: DISTAL URETHRECTOMY;  Surgeon: Leonardo Robles MD;  Location: [facility] OR    COLONOSCOPY N/A 12/22/2017    Procedure: COLONOSCOPY;  colonoscopy;  Surgeon: Chuck Chand MD;  Location: [facility] GI    CV CORONARY ANGIOGRAM N/A 4/23/2024    Procedure: Coronary Angiogram;  Surgeon: Leonardo Marinelli MD;  Location: [facility] CARDIAC CATH LAB    CV CORONARY ANGIOGRAM N/A 6/13/2024    Procedure: Coronary Angiogram;  Surgeon: Juliette Stevens MD;  Location: [facility] CARDIAC CATH LAB    CV INTRAVASULAR ULTRASOUND N/A 4/23/2024    Procedure: Intravascular Ultrasound;  Surgeon: Leonardo Marinelli MD;  Location:  HEART CARDIAC CATH LAB    CV LEFT HEART CATH N/A 4/23/2024    Procedure: Left Heart Catheterization;  Surgeon: Leonardo Marinelli MD;  Location:  HEART CARDIAC CATH LAB    CV LEFT HEART CATH N/A 6/13/2024    Procedure: Left Heart Catheterization;  Surgeon: Juliette Stevens MD;  Location:  HEART CARDIAC CATH LAB    CV PCI N/A 6/13/2024    Procedure: Percutaneous Coronary  Intervention;  Surgeon: Juliette Stevens MD;  Location:  HEART CARDIAC CATH LAB    CV PCI STENT DRUG ELUTING N/A 4/23/2024    Procedure: Percutaneous Coronary Intervention Stent;  Surgeon: Leonardo Marinelli MD;  Location:  HEART CARDIAC CATH LAB    CYSTECTOMY BLADDER RADICAL, ILEAL DIVERSION, COMBINED N/A 6/1/2021    Procedure: RADICAL CYSTECTOMY  WITH ILEAL CONDUIT CREATION,BILATERAL PELVIC LYMPHADENECTOMY;  Surgeon: Leonardo Robles MD;  Location: [facility] OR    CYSTOSCOPY, RETROGRADES, COMBINED Bilateral 3/18/2021    Procedure: CYSTOSCOPY, WITH RETROGRADE PYELOGRAM;  Surgeon: Girish Jack MD;  Location: [facility] OR    CYSTOSCOPY, TRANSURETHRAL RESECTION (TUR) TUMOR BLADDER, COMBINED N/A 3/18/2021    Procedure: CYSTOSCOPY, WITH TRANSURETHRAL RESECTION BLADDER TUMOR;  Surgeon: Girish Jack MD;  Location: [facility] OR    ENDARTERECTOMY CAROTID Left 10/8/2020    Procedure: LEFT CAROTID ENDARTERECTOMY WITH EEG;  Surgeon: Lacho Jasmine MD;  Location:  OR    ESOPHAGOSCOPY, GASTROSCOPY, DUODENOSCOPY (EGD), COMBINED N/A 6/20/2022    Procedure: ESOPHAGOGASTRODUODENOSCOPY, WITH BIOPSY;  Surgeon: Ramses Arenas MD;  Location:  GI    EXCISE MASS AXILLA Left 9/16/2016    Procedure: EXCISE MASS AXILLA;  Surgeon: Keyon Blanco MD;  Location: [facility]    HC REMV CATARACT EXTRACAP,INSERT LENS, W/O ECP  6/25/2009    Right eye    INJECT EPIDURAL LUMBAR N/A 4/11/2019    Procedure: interlaminar epidual steroid injection lumbar 4-5;  Surgeon: Oskar Salvador MD;  Location: [facility]    INJECT EPIDURAL LUMBAR Bilateral 9/12/2019    Procedure: lumbar 4-5 epidural interlaminar steroid injection;  Surgeon: Oskar Salvador MD;  Location: [facility]    INSERT PORT VASCULAR ACCESS Right 10/7/2016    Procedure: INSERT PORT VASCULAR ACCESS;  Surgeon: Keyon Blanco MD;  Location: [facility] OR    IR CHEST PORT PLACEMENT > 5 YRS OF AGE  4/16/2021    LASER YAG CAPSULOTOMY Left 11/7/2024    Procedure: Laser YAG capsulotomy;  Surgeon:  Jamison Knowles MD;  Location: [facility]    MASTECTOMY SIMPLE BILATERAL Bilateral 2017    Procedure: MASTECTOMY SIMPLE BILATERAL;  Surgeon: Keyon Blanco MD;  [facility]: [facility]    OPEN REDUCTION INTERNAL FIXATION FOOT Right 3/20/2015    Procedure: OPEN REDUCTION INTERNAL FIXATION FOOT;  Surgeon: Javi Herrmann DPM;  Location: [facility]    OPTICAL TRACKING SYSTEM FUSION SPINE POSTERIOR LUMBAR TWO LEVELS N/A 2020    Procedure: LUMBAR 2-SACRAL1 TRANSFORMINAL INTERBODY FUSION;  Surgeon: Lenny Gomes MD;  Location: [facility] OR    REMOVE PORT VASCULAR ACCESS Right 2018    Procedure: REMOVE PORT VASCULAR ACCESS;  right intra jugular port removal;  Surgeon: Keoyn Blanco MD;  Location: [facility] OR    SHOULDER SURGERY Right 2015    ZZC LIGATE FALLOPIAN TUBE      ZZC NONSPECIFIC PROCEDURE      ankle fracture surgery       Family History:    Family History   Problem Relation Age of Onset    Hypertension Mother     Thyroid Disease Mother     Cerebrovascular Disease Mother     Hypertension Father     Cerebrovascular Disease Father     Cancer Father         skin    Thyroid Disease Sister     Diabetes Brother         type 2    Depression Sister     Breast Cancer Sister         age 47    Cancer Sister         skin    Cancer Brother         inside nose       Social History:  Marital Status:   [2]  Social History     Tobacco Use    Smoking status: Former     Current packs/day: 0.00     Average packs/day: 3.0 packs/day for 10.0 years (30.0 ttl pk-yrs)     Types: Cigarettes     Start date: 1969     Quit date: 1979     Years since quittin.3     Passive exposure: Never    Smokeless tobacco: Never    Tobacco comments:     approx. 3 packs daily   Vaping Use    Vaping status: Never Used   Substance Use Topics    Alcohol use: Yes     Comment: once in awhile    Drug use: No        Medications:    acetaminophen (TYLENOL) 500 MG tablet  amLODIPine (NORVASC) 2.5 MG tablet  aspirin (ASA) 81 MG chewable  "tablet  atorvastatin (LIPITOR) 80 MG tablet  carvedilol (COREG) 3.125 MG tablet  clopidogrel (PLAVIX) 75 MG tablet  escitalopram (LEXAPRO) 20 MG tablet  ezetimibe (ZETIA) 10 MG tablet  gabapentin (NEURONTIN) 300 MG capsule  HYDROmorphone (DILAUDID) 2 MG tablet  levothyroxine (SYNTHROID/LEVOTHROID) 25 MCG tablet  LORazepam (ATIVAN) 0.5 MG tablet  methylPREDNISolone (MEDROL DOSEPAK) 4 MG tablet therapy pack  nitroGLYcerin (NITROSTAT) 0.4 MG sublingual tablet  oxyCODONE (ROXICODONE) 5 MG tablet  oxyCODONE (ROXICODONE) 5 MG tablet  pantoprazole (PROTONIX) 40 MG EC tablet  prochlorperazine (COMPAZINE) 10 MG tablet  senna-docusate (SENEXON-S) 8.6-50 MG tablet          Review of Systems  All other ROS reviewed and are negative or non-contributory except as stated in HPI.     Physical Exam   BP: 133/90  Pulse: 71  Temp: 97.7  F (36.5  C)  Resp: 18  Height: 165.1 cm (5' 5\")  Weight: 63 kg (139 lb)  SpO2: 97 %      Physical Exam  Vitals and nursing note reviewed.   Constitutional:       Appearance: Normal appearance. She is normal weight.   HENT:      Head: Normocephalic.      Nose: Nose normal.      Mouth/Throat:      Mouth: Mucous membranes are moist.      Pharynx: Oropharynx is clear.   Eyes:      General: No scleral icterus.     Extraocular Movements: Extraocular movements intact.      Conjunctiva/sclera: Conjunctivae normal.   Cardiovascular:      Rate and Rhythm: Normal rate and regular rhythm.      Pulses: Normal pulses.      Heart sounds: Normal heart sounds.   Pulmonary:      Effort: Pulmonary effort is normal.      Breath sounds: Normal breath sounds.   Chest:       Abdominal:      Palpations: Abdomen is soft.      Comments: Abdomen is nontender.  Urostomy bag in place with clear urine   Musculoskeletal:      Cervical back: Normal range of motion and neck supple.      Comments: Lumbar scar  Tenderness in the right mid groin without obvious mass or lesion.  Normal femoral pulse.  Strong distal pulse.  Pain with " active right straight leg raise, no pain with passive leg raise.  Normal range of motion of the hip, knee, ankle.  She can flex the hip and with knee bending while lying without pain.  Pain when bearing weight.   Skin:     General: Skin is warm and dry.      Findings: No rash.   Neurological:      General: No focal deficit present.      Mental Status: She is alert.         ED Course (with Medical Decision Making)    Pt seen and examined by me.  RN and EPIC notes reviewed.       Patient presenting with 2 complaints.  The first is chest pain, 2 days in a row in the morning.  This may be cardiac, musculoskeletal, pulmonary, GI, other.  She does have a lot of risk factors.  EKG was done.  This shows normal sinus rhythm with a rate of 65.  There is a little bit of slight sloping an elevation in lead V2 and V3 but not pathognomonic.  Similar to previous EKG.  Reviewed by myself at 10:49 AM.    IV placed, labs drawn.  Patient has normal CBC  Troponin 31 which is near previous troponins  CMP notable for elevated BUN/creatinine at 35.1/1.12.  Mild elevation in AST to 61, ALT 52.  D-dimer slightly elevated at 0.82    I elected to do CT of her chest, abdomen and pelvis to review areas of her symptomatology.  CT showed no evidence for PE or aortic abnormality.  Stable cystectomy with ileal conduit and right lower quadrant urostomy.  Parastomal hernia.  Bilateral hydronephrosis.  No metastatic disease throughout the chest/abdomen/pelvis.    I also did a CT of her lumbar spine.  This showed no acute abnormality.  She does have degenerative changes.  L2-S1 posterior and interbody fusion.    Radiologic results reviewed by myself.    I discussed the results at length with the patient and her .  She has cardiology through [facility] and I would like her to follow-up with them and discuss her symptomatology.  I am not convinced this is cardiac based but I think follow-up with them would be a good idea.  With regards  to the groin pain, I think this is strain or musculoskeletal in origin as she does not have pain with passive range of motion.  No bony abnormality.    Recommend rest, ice or heat to painful area.  She uses 1 oxycodone per day.  I gave her a short course of steroids for inflammation and Dilaudid if needed for severe pain.  Continue to closely monitor.  Follow-up with her primary care provider to discuss referral for physical therapy or possible spine eval.  Return for worsening, changes or concerns.        Procedures  Results for orders placed or performed during the hospital encounter of 04/04/25 (from the past 24 hours)   EKG 12-lead, tracing only   Result Value Ref Range    Systolic Blood Pressure  mmHg    Diastolic Blood Pressure  mmHg    Ventricular Rate 65 BPM    Atrial Rate 65 BPM    NE Interval 156 ms    QRS Duration 90 ms     ms    QTc 397 ms    P Axis 72 degrees    R AXIS 52 degrees    T Axis 59 degrees    Interpretation ECG       Sinus rhythm  Minimal voltage criteria for LVH, may be normal variant ( Sokolow-Mejia )  Borderline ECG  When compared with ECG of 17-Dec-2024 13:17,  Vent. rate has decreased by  32 bpm  Confirmed by SEE ED PROVIDER NOTE FOR, ECG INTERPRETATION ([id number]),  Debbie Botello ([id number]) on 4/4/2025 10:58:52 AM     [facility] [facility]    Narrative    The following orders were created for panel order [facility] [place] [facility].  Procedure                               Abnormality         Status                     ---------                               -----------         ------                     Extra Blue Top Tube[[id number]]                             Final result               Extra Green Top (Lithiu...[[id number]]                      Final result               Extra Purple Top Tube[[id number]]                           Final result                 Please view results for these tests on the individual orders.   Extra Blue Top Tube   Result Value Ref Range    Hold Specimen JIC    Extra  Green Top (Lithium Heparin) Tube   Result Value Ref Range    Hold Specimen JIC    Extra Purple Top Tube   Result Value Ref Range    Hold Specimen JIC    CBC with platelets differential    Narrative    The following orders were created for panel order CBC with platelets differential.  Procedure                               Abnormality         Status                     ---------                               -----------         ------                     CBC with platelets and ...[[id number]]  Abnormal            Final result                 Please view results for these tests on the individual orders.   D dimer quantitative   Result Value Ref Range    D-Dimer Quantitative 0.82 (H) 0.00 - 0.50 ug/mL FEU    Narrative    This D-dimer assay is intended for use in conjunction with a clinical pretest probability assessment model to exclude pulmonary embolism (PE) and deep venous thrombosis (DVT) in outpatients suspected of PE or DVT. The cut-off value is 0.50 ug/mL FEU.    For patients 50 years of age or older, the application of age-adjusted cut-off values for D-Dimer may increase the specificity without significant effect on sensitivity. The literature suggested calculation age adjusted cut-off in ug/L = age in years x 10 ug/L. The results in this laboratory are reported as ug/mL rather than ug/L. The calculation for age adjusted cut off in ug/mL= age in years x 0.01 ug/mL. For example, the cut off for a 76 year old male is 75 x 0.01 ug/mL = 0.76 ug/mL (760 ug/L).    M Rosalina et al. Age adjusted D-dimer cut-off levels to rule out pulmonary embolism: The ADJUST-PE Study. TAL 2014;311:4200-7370.; SHOSHANA Blackmon et al. Diagnostic accuracy of conventional or age adjusted D-dimer cutoff values in older patients with suspected venous thromboembolism. Systemic review and meta-analysis. BMJ 2013:346:f2492.   Comprehensive metabolic panel   Result Value Ref Range    Sodium 138 135 - 145 mmol/L    Potassium 5.0 3.4 - 5.3 mmol/L     Carbon Dioxide (CO2) 20 (L) 22 - 29 mmol/L    Anion Gap 12 7 - 15 mmol/L    Urea Nitrogen 35.1 (H) 8.0 - 23.0 mg/dL    Creatinine 1.12 (H) 0.51 - 0.95 mg/dL    GFR Estimate 51 (L) >60 mL/min/1.73m2    Calcium 10.4 8.8 - 10.4 mg/dL    Chloride 106 98 - 107 mmol/L    Glucose 92 70 - 99 mg/dL    Alkaline Phosphatase 74 40 - 150 U/L    AST 61 (H) 0 - 45 U/L    ALT 52 (H) 0 - 50 U/L    Protein Total 7.2 6.4 - 8.3 g/dL    Albumin 4.6 3.5 - 5.2 g/dL    Bilirubin Total 0.4 <=1.2 mg/dL   Troponin T, High Sensitivity   Result Value Ref Range    Troponin T, High Sensitivity 31 (H) <=14 ng/L   CBC with platelets and differential   Result Value Ref Range    WBC Count 7.1 4.0 - 11.0 10e3/uL    RBC Count 3.57 (L) 3.80 - 5.20 10e6/uL    Hemoglobin 11.8 11.7 - 15.7 g/dL    Hematocrit 35.4 35.0 - 47.0 %    MCV 99 78 - 100 fL    MCH 33.1 (H) 26.5 - 33.0 pg    MCHC 33.3 31.5 - 36.5 g/dL    RDW 14.0 10.0 - 15.0 %    Platelet Count 249 150 - 450 10e3/uL    % Neutrophils 55 %    % Lymphocytes 31 %    % Monocytes 9 %    % Eosinophils 4 %    % Basophils 1 %    % Immature Granulocytes 0 %    NRBCs per 100 WBC 0 <1 /100    Absolute Neutrophils 3.9 1.6 - 8.3 10e3/uL    Absolute Lymphocytes 2.2 0.8 - 5.3 10e3/uL    Absolute Monocytes 0.6 0.0 - 1.3 10e3/uL    Absolute Eosinophils 0.3 0.0 - 0.7 10e3/uL    Absolute Basophils 0.1 0.0 - 0.2 10e3/uL    Absolute Immature Granulocytes 0.0 <=0.4 10e3/uL    Absolute NRBCs 0.0 10e3/uL   CT Lumbar Spine w/o Contrast    Narrative    EXAM: CT LUMBAR SPINE W/O CONTRAST  LOCATION: [facility]  DATE: 4/4/2025    INDICATION: History of of lumbar surgery, now with increased pain into the groin  COMPARISON: CT chest, abdomen, and pelvis 11/25/2024.  TECHNIQUE: Routine CT Lumbar Spine without IV contrast. Multiplanar reformats. Dose reduction techniques were used.    FINDINGS:  VERTEBRAE: Diffuse osseous demineralization which somewhat degrades assessment for acute fracture.  Unchanged vertebral body heights without discernible acute fracture. L2-S1 posterior and interbody fusion without findings to suggest hardware failure or   loosening. Moderate-severe degenerative changes throughout the lumbar spine with facet arthropathy, vertebral body osteophyte formation, and disc height loss.    CANAL: No discernible high-grade spinal canal stenosis.    PARASPINAL: Postsurgical changes throughout the dorsal paraspinal soft tissue within the incompletely imaged cerumen.      Impression    IMPRESSION:  1.  No discernible acute traumatic abnormality within the lumbar spine. CT myelogram could be considered if there is concern for spinal canal stenosis.   CT Chest (PE) Abdomen Pelvis w Contrast    Narrative    EXAM: CT CHEST PE ABDOMEN PELVIS W CONTRAST  LOCATION: [facility]  DATE: 4/4/2025    INDICATION: Chest pain, history of breast and bladder cancer. Right groin right lower quadrant pain. Patient has urostomy.  COMPARISON: 11/25/2024  TECHNIQUE: CT chest pulmonary angiogram and routine CT abdomen pelvis with IV contrast. Arterial phase through the chest and venous phase through the abdomen and pelvis. Multiplanar reformats and MIP reconstructions were performed. Dose reduction   techniques were used.   CONTRAST: ISOVUE 370, 75 mL    FINDINGS:  ANGIOGRAM CHEST: Pulmonary arteries are normal caliber and negative for pulmonary emboli. Thoracic aorta is negative for dissection. No CT evidence of right heart strain.     LUNGS AND PLEURA: Mild atelectasis in both bases. A few tiny stable nodules are seen, for example lateral right lower lobe on image 139 of series 6. No new or enlarging nodules. No infiltrate or effusion.    MEDIASTINUM/AXILLAE: Right chest port is present. No axillary or mediastinal adenopathy. The aorta is normal in caliber. No pericardial effusion.    CORONARY ARTERY CALCIFICATION: Severe.    HEPATOBILIARY: Normal.    PANCREAS: Normal.    SPLEEN:  Normal.    ADRENAL GLANDS: Normal.    KIDNEYS/BLADDER: Bilateral hydronephrosis is noted. No enhancing mass through either kidney. The patient is status post cystectomy with ileal conduit formation and right lower quadrant urostomy. Parastomal hernia is noted including the distal aspect of   the ileal conduit. No evidence for soft tissue nodule or adenopathy through the cystectomy bed.    BOWEL: No bowel obstruction. No colonic wall thickening or inflammatory change. Sigmoid diverticulosis is noted without evidence of diverticulitis.    LYMPH NODES: Normal.    VASCULATURE: The aorta is normal in caliber with extensive atherosclerotic calcification.    PELVIC ORGANS: No free fluid or adenopathy in the pelvis.    MUSCULOSKELETAL: Degenerative changes are noted through the spine. No evidence for bony metastatic disease.      Impression    IMPRESSION:  1.  No evidence for pulmonary embolism or aortic abnormality.  2.  Stable appearance of cystectomy with ileal conduit formation and right lower quadrant urostomy.  3.  Parastomal hernia is noted including the distal aspect of the ileal conduit.  4.  Bilateral hydronephrosis. No apparent cause.  5.  No evidence for metastatic disease through the chest, abdomen, and pelvis.     Troponin T, High Sensitivity   Result Value Ref Range    Troponin T, High Sensitivity 28 (H) <=14 ng/L   UA with Microscopic reflex to Culture    Specimen: Urine, Midstream   Result Value Ref Range    Color Urine Straw Colorless, Straw, Light Yellow, Yellow    Appearance Urine Clear Clear    Glucose Urine Negative Negative mg/dL    Bilirubin Urine Negative Negative    Ketones Urine Negative Negative mg/dL    Specific Gravity Urine 1.029 1.003 - 1.035    Blood Urine Trace (A) Negative    pH Urine 6.0 5.0 - 7.0    Protein Albumin Urine Negative Negative mg/dL    Urobilinogen Urine Normal Normal mg/dL    Nitrite Urine Negative Negative    Leukocyte Esterase Urine Moderate (A) Negative    RBC Urine 6 (H)  <=2 /HPF    WBC Urine 4 <=5 /HPF    Narrative    Urine Culture ordered based on laboratory criteria     *Note: Due to a large number of results and/or encounters for the requested time period, some results have not been displayed. A complete set of results can be found in Results Review.       Medications   iopamidol (ISOVUE-370) solution 500 mL (75 mLs Intravenous $Given 4/4/25 1303)   sodium chloride 0.9 % bag for CT scan flush (60 mLs Intravenous $Given 4/4/25 1302)   sodium chloride 0.9% BOLUS 1,000 mL (0 mLs Intravenous Stopped 4/4/25 1518)   HYDROmorphone (PF) (DILAUDID) injection 0.5 mg (0.5 mg Intravenous $Given 4/4/25 1517)       Assessments & Plan     I have reviewed the findings, diagnosis, plan and need for follow up with the patient.    Discharge Medication List as of 4/4/2025  3:24 PM        START taking these medications    Details   HYDROmorphone (DILAUDID) 2 MG tablet Take 1 tablet (2 mg) by mouth every 6 hours as needed for pain., Disp-10 tablet, R-0, E-Prescribe      methylPREDNISolone (MEDROL DOSEPAK) 4 MG tablet therapy pack Follow package instructions, Disp-21 tablet, R-0, E-Prescribe             Final diagnoses:   Chest pain, unspecified type   Right groin pain     Disposition: Patient discharged home in stable condition.  Plan as above.  Return for concerns.     Note: Chart documentation done in part with Dragon Voice Recognition software. Although reviewed after completion, some word and grammatical errors may remain.   4/4/2025   [facility] EMERGENCY DEPT       Sarah Alegria MD  04/05/25 [id number]

## 2025-04-06 NOTE — TELEPHONE ENCOUNTER
[facility]    Reason for call: Lab Result Notification     Lab Result (including Rx patient on, if applicable).  If culture, copy of lab report at bottom.  Lab Result: Urine Culture  4/4/25 No antibiotic prescribed    Creatinine Level (mg/dl)   Creatinine   Date Value Ref Range Status   04/04/2025 1.12 (H) 0.51 - 0.95 mg/dL Final   06/24/2021 1.10 (H) 0.52 - 1.04 mg/dL Final    Creatinine clearance (ml/min), if applicable    Serum creatinine: 1.12 mg/dL (H) 04/04/25 1141  Estimated creatinine clearance: 43.2 mL/min (A)     RN Recommendations/Instructions per [facility] ED lab result protocol:     Per 4/4/25 ED Results note await FINAL result per pt request

## 2025-04-07 ENCOUNTER — MYC MEDICAL ADVICE (OUTPATIENT)
Dept: FAMILY MEDICINE | Facility: OTHER | Age: 75
End: 2025-04-07
Payer: COMMERCIAL

## 2025-04-07 DIAGNOSIS — F43.21 ADJUSTMENT DISORDER WITH DEPRESSED MOOD: Primary | ICD-10-CM

## 2025-04-07 DIAGNOSIS — N30.00 ACUTE CYSTITIS WITHOUT HEMATURIA: Primary | ICD-10-CM

## 2025-04-07 LAB
BACTERIA UR CULT: ABNORMAL
BACTERIA UR CULT: ABNORMAL

## 2025-04-07 RX ORDER — NITROFURANTOIN 25; 75 MG/1; MG/1
100 CAPSULE ORAL 2 TIMES DAILY
Qty: 14 CAPSULE | Refills: 0 | Status: SHIPPED | OUTPATIENT
Start: 2025-04-07 | End: 2025-04-14

## 2025-04-07 RX ORDER — ESCITALOPRAM OXALATE 20 MG/1
20 TABLET ORAL DAILY
Qty: 90 TABLET | Refills: 0 | Status: SHIPPED | OUTPATIENT
Start: 2025-04-07

## 2025-04-07 NOTE — TELEPHONE ENCOUNTER
[facility]    Reason for call: Lab Result Notification     Lab Result (including Rx patient on, if applicable).  If culture, copy of lab report at bottom.  Lab Result: Urine Culture - see below    ED Rx: No antibiotic prescribed    Creatinine Level (mg/dl)   Creatinine   Date Value Ref Range Status   04/04/2025 1.12 (H) 0.51 - 0.95 mg/dL Final   06/24/2021 1.10 (H) 0.52 - 1.04 mg/dL Final    Creatinine clearance (ml/min), if applicable    Serum creatinine: 1.12 mg/dL (H) 04/04/25 1141  Estimated creatinine clearance: 43.2 mL/min (A)     ED Symptoms: Patient presented to [facility] ED on 4/4/2025 with chest pain and groin pain    RN Recommendations/Instructions per [place] ED lab result protocol:   [facility] ED lab result protocol utilized: Urine culture     [facility] Emergency Department Provider: Brenden Royal MD  Consultation Time: 11:51  Provider Recommendation:  Start Nitrofurantoin 100mg PO BID x 7 days. F/U in clinic in 2-3 days.  Patient/parent notified of Providers recommendations (YES/NO): Yes, spoke with patient and relayed provider recommendation. Prescription sent to [facility] in [place] per patient request.     Patient's current Symptoms:   Low back/flank pain, stomach discomfort. Pain is at a 3/10. No nausea or fever. Chest pain is resolved. Urine is clear, urostomy is patent and draining. Fatigued, generalized weakness    Does patient fit any of the following criteria?:     Has allergy to medications: Yes; please explain: Oxybutynin    Is breastfeeding: N/A    Is pregnant: N/A    On Coumadin/Warfarin: No      Patient/care giver notified to contact your PCP clinic or return to the Emergency department if your:  Symptoms do not resolve after completing antibiotic.  Symptoms worsen or other concerning symptoms.       Yecenia Villegas RN

## 2025-04-09 ENCOUNTER — MYC MEDICAL ADVICE (OUTPATIENT)
Dept: CARDIOLOGY | Facility: CLINIC | Age: 75
End: 2025-04-09
Payer: COMMERCIAL

## 2025-04-09 DIAGNOSIS — G62.9 NEUROPATHY: ICD-10-CM

## 2025-04-09 NOTE — TELEPHONE ENCOUNTER
Dr. Reed requested all future refills are routed to PCP if neurology symptoms are stable and to follow up with him as needed.                       RX Authorization    Medication: gabapentin (NEURONTIN) 300 MG capsule     Date last refill ordered: 11/25/2024    Quantity ordered: 540     # refills: 1

## 2025-04-10 ENCOUNTER — HOSPITAL ENCOUNTER (OUTPATIENT)
Dept: NUCLEAR MEDICINE | Facility: CLINIC | Age: 75
Setting detail: NUCLEAR MEDICINE
Discharge: HOME | End: 2025-04-10
Attending: PHYSICIAN ASSISTANT
Payer: MEDICARE

## 2025-04-10 ENCOUNTER — HOSPITAL ENCOUNTER (OUTPATIENT)
Dept: CARDIOLOGY | Facility: CLINIC | Age: 75
Setting detail: NUCLEAR MEDICINE
Discharge: HOME | End: 2025-04-10
Attending: PHYSICIAN ASSISTANT
Payer: MEDICARE

## 2025-04-10 DIAGNOSIS — I25.10 CORONARY ARTERY CALCIFICATION: ICD-10-CM

## 2025-04-10 DIAGNOSIS — I25.10 CORONARY ARTERY DISEASE INVOLVING NATIVE CORONARY ARTERY OF NATIVE HEART WITHOUT ANGINA PECTORIS: ICD-10-CM

## 2025-04-10 DIAGNOSIS — E78.5 HYPERLIPIDEMIA LDL GOAL <130: ICD-10-CM

## 2025-04-10 LAB
CV STRESS MAX HR HE: 92
RATE PRESSURE PRODUCT: NORMAL
STRESS ECHO BASELINE DIASTOLIC HE: 80
STRESS ECHO BASELINE HR: 68 BPM
STRESS ECHO BASELINE SYSTOLIC BP: 122
STRESS ECHO CALCULATED PERCENT HR: 63 %
STRESS ECHO LAST STRESS DIASTOLIC BP: 68
STRESS ECHO LAST STRESS SYSTOLIC BP: 114
STRESS ECHO TARGET HR: 145

## 2025-04-10 PROCEDURE — A9502 TC99M TETROFOSMIN: HCPCS | Performed by: PHYSICIAN ASSISTANT

## 2025-04-10 PROCEDURE — 93017 CV STRESS TEST TRACING ONLY: CPT

## 2025-04-10 PROCEDURE — 343N000001 HC RX 343 MED OP 636: Performed by: PHYSICIAN ASSISTANT

## 2025-04-10 PROCEDURE — 250N000011 HC RX IP 250 OP 636: Mod: JZ | Performed by: PHYSICIAN ASSISTANT

## 2025-04-10 PROCEDURE — 78452 HT MUSCLE IMAGE SPECT MULT: CPT

## 2025-04-10 RX ORDER — GABAPENTIN 300 MG/1
600 CAPSULE ORAL 3 TIMES DAILY
Qty: 540 CAPSULE | Refills: 0 | Status: SHIPPED | OUTPATIENT
Start: 2025-04-10

## 2025-04-10 RX ORDER — EZETIMIBE 10 MG/1
10 TABLET ORAL DAILY
Qty: 90 TABLET | Refills: 2 | Status: SHIPPED | OUTPATIENT
Start: 2025-04-10

## 2025-04-10 RX ORDER — REGADENOSON 0.08 MG/ML
0.4 INJECTION, SOLUTION INTRAVENOUS ONCE
Status: COMPLETED | OUTPATIENT
Start: 2025-04-10 | End: 2025-04-10

## 2025-04-10 RX ADMIN — TETROFOSMIN 10.6 MILLICURIE: 1.38 INJECTION, POWDER, LYOPHILIZED, FOR SOLUTION INTRAVENOUS at 12:28

## 2025-04-10 RX ADMIN — REGADENOSON 0.4 MG: 0.08 INJECTION, SOLUTION INTRAVENOUS at 13:39

## 2025-04-10 RX ADMIN — TETROFOSMIN 32.1 MILLICURIE: 1.38 INJECTION, POWDER, LYOPHILIZED, FOR SOLUTION INTRAVENOUS at 14:57

## 2025-04-11 ENCOUNTER — LAB (OUTPATIENT)
Dept: LAB | Facility: CLINIC | Age: 75
End: 2025-04-11
Payer: COMMERCIAL

## 2025-04-11 ENCOUNTER — MYC MEDICAL ADVICE (OUTPATIENT)
Dept: CARDIOLOGY | Facility: CLINIC | Age: 75
End: 2025-04-11

## 2025-04-11 DIAGNOSIS — I25.10 CORONARY ARTERY CALCIFICATION: ICD-10-CM

## 2025-04-11 DIAGNOSIS — E78.5 HYPERLIPIDEMIA LDL GOAL <130: ICD-10-CM

## 2025-04-11 DIAGNOSIS — N18.31 STAGE 3A CHRONIC KIDNEY DISEASE (H): Primary | ICD-10-CM

## 2025-04-12 LAB — BACTERIA UR CULT: NORMAL

## 2025-04-14 RX ORDER — EZETIMIBE 10 MG/1
10 TABLET ORAL DAILY
Qty: 90 TABLET | Refills: 3 | Status: SHIPPED | OUTPATIENT
Start: 2025-04-14

## 2025-04-16 ENCOUNTER — NURSE TRIAGE (OUTPATIENT)
Dept: FAMILY MEDICINE | Facility: OTHER | Age: 75
End: 2025-04-16
Payer: COMMERCIAL

## 2025-04-16 NOTE — TELEPHONE ENCOUNTER
Nurse Triage SBAR    Is this a 2nd Level Triage? NO    Situation: patient has right hip pain for years. She states her pain increased 2 weeks ago.    Background: she has received cortisone shots for her right hip before and this has helped.    Assessment: current right hip pain is 6 out of 10 when she is walking.no swelling. No fever and no redness to the area. She does have numbness in her leg and foot on that side that started 1 week ago.      Protocol Recommended Disposition:   See in Office Today or Tomorrow    Recommendation: per protocol patient should be seen. She states she usually gets cortisone shots. She is wondering if she can get in for this?     Routed to provider  oMira Pinto RN on 4/16/2025 at 1:27 PM      Reason for Disposition   Numbness in a leg or foot (i.e., loss of sensation)    Additional Information   Negative: Looks like a broken bone or dislocated joint (e.g., crooked or deformed)   Negative: Sounds like a life-threatening emergency to the triager   Negative: Followed a hip injury   Negative: Leg pain is main symptom   Negative: Back pain radiating (shooting) into hip   Negative: SEVERE pain (e.g., excruciating, unable to do any normal activities) and fever   Negative: Can't stand (bear weight) or walk   Negative: Fever and red area (or area very tender to touch)   Negative: Patient sounds very sick or weak to the triager   Negative: SEVERE pain (e.g., excruciating, unable to do any normal activities)   Negative: Red area or streak > 2 inches (or 5 cm)   Negative: Painful rash with multiple small blisters grouped together (i.e., dermatomal distribution or 'band' or 'stripe')   Negative: Looks like a boil, infected sore, deep ulcer, or other infected rash (spreading redness, pus)   Negative: Localized rash is very painful (no fever)    Protocols used: Hip Pain-A-[place]

## 2025-04-16 NOTE — TELEPHONE ENCOUNTER
RN Triage    Patient Contact    Attempt # 1    RN did attempt to reach patient . No answer. Message left for patient to call the clinic back and ask to speak to a member of the care team. Wanting to triage patient's right hip pain.      Andrew Zhou RN on 4/16/2025 at 12:56 PM

## 2025-04-17 DIAGNOSIS — M51.369 DDD (DEGENERATIVE DISC DISEASE), LUMBAR: ICD-10-CM

## 2025-04-17 DIAGNOSIS — M54.16 LUMBAR RADICULOPATHY: ICD-10-CM

## 2025-04-17 DIAGNOSIS — M48.061 FORAMINAL STENOSIS OF LUMBAR REGION: ICD-10-CM

## 2025-04-17 DIAGNOSIS — G89.4 CHRONIC PAIN SYNDROME: ICD-10-CM

## 2025-04-17 DIAGNOSIS — F43.21 ADJUSTMENT DISORDER WITH DEPRESSED MOOD: ICD-10-CM

## 2025-04-17 DIAGNOSIS — R11.0 NAUSEA: ICD-10-CM

## 2025-04-17 RX ORDER — OXYCODONE HYDROCHLORIDE 5 MG/1
5 TABLET ORAL EVERY MORNING
Qty: 30 TABLET | Refills: 0 | Status: SHIPPED | OUTPATIENT
Start: 2025-04-17

## 2025-04-17 RX ORDER — PROCHLORPERAZINE MALEATE 10 MG
5 TABLET ORAL EVERY 6 HOURS PRN
Qty: 30 TABLET | Refills: 0 | Status: SHIPPED | OUTPATIENT
Start: 2025-04-17

## 2025-04-17 RX ORDER — LORAZEPAM 0.5 MG/1
TABLET ORAL
Qty: 15 TABLET | Refills: 0 | Status: SHIPPED | OUTPATIENT
Start: 2025-04-17

## 2025-04-17 NOTE — TELEPHONE ENCOUNTER
Phani Albright PA-C to Moira Pinto RN  [facility]     4/17/25  6:17 AM  She would need to talk to the orthopedic specialist for this.  If she needs a new referral I can place that for her.

## 2025-04-23 ENCOUNTER — MYC MEDICAL ADVICE (OUTPATIENT)
Dept: FAMILY MEDICINE | Facility: OTHER | Age: 75
End: 2025-04-23
Payer: COMMERCIAL

## 2025-04-23 NOTE — TELEPHONE ENCOUNTER
"Patient is also seeing sports med 5/1 for hip pain.     Patient seen in ED 4/4/25 for chest pain and groin pain. Regarding pain in leg/groin:    Patient has had the symptoms of \"neuropathy all over\".  She feels very warm.  She had some charley horses in her legs this morning.  She took 3 aspirin yesterday.    Her other concern is for 4 days of right groin pain.  Pain occurs when she stands and walks.  It starts in her right groin with radiation into her low back and down to her knee.  She has history of lumbar fusion over 10 years ago.  She denies any falls or known trauma.  She does not have pain at rest.  Patient has a urostomy bag and has had normal urine.  No saddle anesthesia.  No weakness.  ....  Musculoskeletal:      Cervical back: Normal range of motion and neck supple.      Comments: Lumbar scar  Tenderness in the right mid groin without obvious mass or lesion.  Normal femoral pulse.  Strong distal pulse.  Pain with active right straight leg raise, no pain with passive leg raise.  Normal range of motion of the hip, knee, ankle.  She can flex the hip and with knee bending while lying without pain.  Pain when bearing weight.   ....  Continue to closely monitor. Follow-up with her primary care provider to discuss referral for physical therapy or possible spine eval. Return for worsening, changes or concerns.       Routing to PCP for review. Recommended follow up? Clinic visit to discuss further/ED follow up?    Tami Mujica RN, BSN    "

## 2025-04-30 NOTE — PROGRESS NOTES
Michaela Dorman  :  1950  DOS: 2025  MRN: [id number]  PCP: Phani Albright    Sports Medicine Clinic Visit      HPI  Michaela Dorman is a 74 year old female who is seen as a self referral presenting with right hip pain.    - Mechanism of Injury:    - No inciting injury  - Pertinent history and prior evaluations:    -  Chronic right hip pain. Hoping for injection   - History of L4-L5 VIANNEY   - History of greater trochanteric bursitis, good responses to injections in the past.     - Pain Character:    - Location:  lateral right hip  - Character:  aching  - Duration:  past few months. Acute on chronic  - Course:  lingering  - Endorses:    -  pain  - Denies:    - swelling, clicking/popping, grinding, mechanical locking symptoms, instability, numbness, tingling, radicular shooting pain, weakness  - Alleviating factors:    -  nothing recently  - Aggravating factors:    - lying on her right side due to a urostomy  - Other treatments tried:    -  corticosteroid injections up until about 4 years ago.     - Patient Goals:    - Corticosteroid injection  - Social History:   - Retired.        Review of Systems  Musculoskeletal: as above  Remainder of review of systems is negative including constitutional, CV, pulmonary, GI, Skin and Neurologic except as noted in HPI or medical history.    Past Medical History:   Diagnosis Date    Acute kidney injury     Carotid stenosis, bilateral L80%, R40% 2020    Central stenosis of spinal canal 2017    lumbar     DDD (degenerative disc disease), lumbar 2017    Depressive disorder, not elsewhere classified     Esophageal reflux     ETOH abuse     in remission    Foraminal stenosis of lumbar region 2017    Complete lumbar spine left at various degrees and to a lesser extent the right as well.    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    Subdural hematoma (H)     Thyroid disease     Urothelial carcinoma (H)      Past Surgical History:   Procedure Laterality Date    ARTHROPLASTY SHOULDER Right 11/17/2015    ARTHROSCOPY KNEE  6/7/2012    Procedure:ARTHROSCOPY KNEE; right knee arthroscopy with partial lateral menisectomy; Surgeon:DAMIÁN MCALLISTER; Location:[facility]    BIOPSY VAGINAL N/A 10/27/2021    Procedure: DISTAL URETHRECTOMY;  Surgeon: Leonardo Robles MD;  Location: [facility] OR    COLONOSCOPY N/A 12/22/2017    Procedure: COLONOSCOPY;  colonoscopy;  Surgeon: Chuck Chand MD;  Location: [facility]    CV CORONARY ANGIOGRAM N/A 4/23/2024    Procedure: Coronary Angiogram;  Surgeon: Leonardo Marinelli MD;  [facility]    CV CORONARY ANGIOGRAM N/A 6/13/2024    Procedure: Coronary Angiogram;  Surgeon: Juliette Stevens MD;  Location:  [facility]    CV INTRAVASULAR ULTRASOUND N/A 4/23/2024    Procedure: Intravascular Ultrasound;  Surgeon: Leonardo Marinelli MD;  Location:  [facility]    CV LEFT HEART CATH N/A 4/23/2024    Procedure: Left Heart Catheterization;  Surgeon: Leonardo Marinelli MD;  Location:  [facility]    CV LEFT HEART CATH N/A 6/13/2024    Procedure: Left Heart Catheterization;  Surgeon: Juliette Stevens MD;  Location:  [facility]    CV PCI N/A 6/13/2024    Procedure: Percutaneous Coronary Intervention;  Surgeon: Juliette Stevens MD;  Location: [facility] CARDIAC CATH LAB    CV PCI STENT DRUG ELUTING N/A 4/23/2024    Procedure: Percutaneous Coronary Intervention Stent;  Surgeon: Leonardo Marinelli MD;  Location:  [facility]    CYSTECTOMY BLADDER RADICAL, ILEAL DIVERSION, COMBINED N/A 6/1/2021    Procedure: RADICAL CYSTECTOMY  WITH ILEAL CONDUIT CREATION,BILATERAL PELVIC LYMPHADENECTOMY;  Surgeon: Leonardo Robles MD;   Location: [facility] OR    CYSTOSCOPY, RETROGRADES, COMBINED Bilateral 3/18/2021    Procedure: CYSTOSCOPY, WITH RETROGRADE PYELOGRAM;  Surgeon: Girish Jack MD;  Location: [facility]    CYSTOSCOPY, TRANSURETHRAL RESECTION (TUR) TUMOR BLADDER, COMBINED N/A 3/18/2021    Procedure: CYSTOSCOPY, WITH TRANSURETHRAL RESECTION BLADDER TUMOR;  Surgeon: Girish Jack MD;  Location: [facility]    ENDARTERECTOMY CAROTID Left 10/8/2020    Procedure: LEFT CAROTID ENDARTERECTOMY WITH EEG;  Surgeon: Lacho Jasmine MD;  Location: [facility]    ESOPHAGOSCOPY, GASTROSCOPY, DUODENOSCOPY (EGD), COMBINED N/A 6/20/2022    Procedure: ESOPHAGOGASTRODUODENOSCOPY, WITH BIOPSY;  Surgeon: Ramses Arenas MD;  Location: [facility]    EXCISE MASS AXILLA Left 9/16/2016    Procedure: EXCISE MASS AXILLA;  Surgeon: Keyon Blanco MD;  Location: [facility]    HC REMV CATARACT EXTRACAP,INSERT LENS, W/O ECP  6/25/2009    Right eye    INJECT EPIDURAL LUMBAR N/A 4/11/2019    Procedure: interlaminar epidual steroid injection lumbar 4-5;  Surgeon: Oskar Salvador MD;  [facility]    INJECT EPIDURAL LUMBAR Bilateral 9/12/2019    Procedure: lumbar 4-5 epidural interlaminar steroid injection;  Surgeon: Oskar Salvador MD;  [facility]: [facility]    INSERT PORT VASCULAR ACCESS Right 10/7/2016    Procedure: INSERT PORT VASCULAR ACCESS;  Surgeon: Keyon Blanco MD;  [facility]    IR CHEST PORT PLACEMENT > 5 YRS OF AGE  4/16/2021    LASER YAG CAPSULOTOMY Left 11/7/2024    Procedure: Laser YAG capsulotomy;  Surgeon: Jamison Knowles MD;  [facility]    MASTECTOMY SIMPLE BILATERAL Bilateral 2/8/2017    Procedure: MASTECTOMY SIMPLE BILATERAL;  Surgeon: Keyon Blanco MD;  [facility]    OPEN REDUCTION INTERNAL FIXATION FOOT Right 3/20/2015    Procedure: OPEN REDUCTION INTERNAL FIXATION FOOT;  Surgeon: Javi Herrmann DPM;  [facility]: [facility]    OPTICAL TRACKING SYSTEM FUSION SPINE POSTERIOR LUMBAR TWO LEVELS N/A 2/4/2020    Procedure: LUMBAR 2-SACRAL1 TRANSFORMINAL  INTERBODY FUSION;  Surgeon: Lenny Gomes MD;  Location: [facility]    REMOVE PORT VASCULAR ACCESS Right 2/14/2018    Procedure: REMOVE PORT VASCULAR ACCESS;  right intra jugular port removal;  Surgeon: Keyon Blanco MD;  [facility]: [facility]    SHOULDER SURGERY Right 11/2015    [facility] LIGATE FALLOPIAN TUBE      [facility] NONSPECIFIC PROCEDURE  1956    ankle fracture surgery     Family History   Problem Relation Age of Onset    Hypertension Mother     Thyroid Disease Mother     Cerebrovascular Disease Mother     Hypertension Father     Cerebrovascular Disease Father     Cancer Father         skin    Thyroid Disease Sister     Diabetes Brother         type 2    Depression Sister     Breast Cancer Sister         age 47    Cancer Sister         skin    Cancer Brother         inside nose         Objective  There were no vitals taken for this visit.    General: healthy, alert and in no acute distress.    HEENT: no scleral icterus or conjunctival erythema.   Skin: no suspicious lesions or rash. No jaundice.   CV: regular rhythm by palpation, 2+ distal pulses.  Resp: normal respiratory effort without conversational dyspnea.   Psych: normal mood and affect.    Gait: nonantalgic, appropriate coordination and balance. ***    Neuro:        - Sensation to light touch:    - *** Intact throughout the BLE including all peripheral nerve distributions.   - Diminished sensation in the ***. Otherwise SILT in all other nerve distributions.        - MSR:      RLE  LLE  - Patella 2+ 2+  - Achilles 2+ 2+       - Special tests:   - Slump/SLR:  Neg     MSK - Hip:       - Inspection:    - No significant swelling, erythema, warmth, ecchymosis, lesion.        - ROM:    - Full AROM/PROM with ***  - Limited in *** by pain.       - Palpation:    - TTP at the ***.   - NTTP elsewhere.        - Strength:  (*antalgic)  RLE LLE  - Hip Flexion  5 5   - Hip Extension 5 5   - Hip Abduction 5 5   - Hip Adduction 5 5  - Knee Flexion  5 5  - Knee Extension 5 5  -  Dorsiflexion  5 5  - Plantarflexion 5 5  - Ext. Emeterio. Longus 5 5  - Inversion  5 5  - Eversion  5 5       - Special tests:   - Log roll:  Neg ***   - Resisted SLR:  Neg ***   - FADIR/Scour:  Neg ***   - MADISON:  Neg ***   - Víctor:  Neg ***       - Functional tests:   - Deep squat:  Able to perform with *** pain, *** valgus deformity, *** imbalance  - Single leg squat:  Able to perform with *** pain, *** valgus deformity, *** imbalance  - Single leg hop:  Able to perform 30 repetitions with *** pain        - Heel raises:   Able to perform 10 repetitions bilaterally with *** pain      Radiology  I independently reviewed the available relevant imaging in the chart with my interpretations as above in HPI.     I independently reviewed today's new relevant imaging, with the following interpretation:  - ***      Procedure  Large Joint Injection/Arthocentesis: R greater trochanteric bursa    Date/Time: 5/1/2025 10:15 AM    Performed by: Dennis Mueller DO  Authorized by: Dennis Mueller DO    Indications:  Pain  Needle Size:  25 G  Guidance: ultrasound    Approach:  Lateral  Location:  Hip      Site:  R greater trochanteric bursa  Medications:  40 mg triamcinolone 40 MG/ML; 1 mL lidocaine 1 %; 1 mL BUPivacaine 0.5 %  Outcome:  Tolerated well, no immediate complications  Procedure discussed: discussed risks, benefits, and alternatives    Consent Given by:  Patient  Prep: patient was prepped and draped in usual sterile fashion     Ultrasound images of procedure were permanently stored.             Assessment  No diagnosis found.    Plan  Michaela Dorman is a pleasant 74 year old female that presents with ***. History and physical exam appear most consistent with ***.     We discussed the nature of the condition and available treatment options, and mutually agreed upon the following plan:    - Imaging:          - Reviewed and independently interpreted the relevant imaging in the chart, including any imaging ordered for today's  clinic.  - Reviewed results and images with patient.   - Medications:          - Discussed pharmacologic options for pain relief.   - May use NSAIDs (Ibuprofen, Naproxen) or Acetaminophen (Tylenol) as needed for pain control.   - Do not take these if previously advised to avoid them for other medical conditions.  - May also use topical medications such as lidocaine, IcyHot, BioFreeze, or Voltaren gel as needed for pain control.    - Voltaren gel is an anti-inflammatory cream that may be used up to 4 times per day over the painful area.   - Injections:          - Discussed possible injection options and alternatives.    - Injection options include:  ***     - *** Deferred injections today and will consider them in the future as needed.   - Performed a corticosteroid injection of the *** today in clinic. Patient tolerated the procedure well without complications.     - Post-procedure instructions:    - Keep the injection site clean and dry.   - Do not submerge the injection site for 24 hours (no baths, pools). Showers are ok.   - Rest the area for 24-48 hours before resuming normal activities. Avoid overexerting the area for the first few weeks.   - It may take 2-3 days to start noticing the effects of the injection and up to 3-4 weeks to feel significant benefits.   - Therapy:          - Discussed the benefits of therapy vs home exercise program for optimization of range of motion, flexibility, strength, stability and function.   - *** Preference is for a home exercise program.   - Home Exercise Program given today in clinic and recommendation given to perform HEP daily and after exacerbations.  - *** Preference is for therapy.   - *** Therapy referral placed today and instructed to call [phone] to schedule appointments.   - Modalities:          - May use ice, heat, massage or other modalities as needed.   - Bracing:          - Discussed bracing options and recommend using ***.    - Options presented in clinic  and choice given to take our brace from clinic or purchase an equivalent brace from an outside source.   - Surgery:          - Discussed non-operative and operative treatment options for the patient's condition.   - Goal is to continue conservative care for as long as possible before surgical intervention would need to be considered.  - Activity:          - *** Encouraged to remain active and participate in regular activities as symptoms allow.   Avoid or modify exacerbating activities as needed.   - *** Encouraged to rest and protect the injured area from further injury.  Avoid exacerbating activities and activities that are high-risk for falls.   - Follow up:          - *** As needed for re-evaluation and update to treatment plan.  - May follow up sooner for new/worsening symptoms.  - May contact clinic by phone or MyChart for questions or concerns.       Dennis Mueller DO, NILSON  [facility] - Sports Medicine  [facility] - Department of Orthopedic Surgery       Disclaimer:  This note was prepared and written using Dragon Medical dictation software. As a result, there may be errors in the script that have gone undetected. Please consider this when interpreting the information in this note.

## 2025-05-01 ENCOUNTER — ANCILLARY PROCEDURE (OUTPATIENT)
Dept: GENERAL RADIOLOGY | Facility: CLINIC | Age: 75
End: 2025-05-01
Attending: STUDENT IN AN ORGANIZED HEALTH CARE EDUCATION/TRAINING PROGRAM
Payer: COMMERCIAL

## 2025-05-01 ENCOUNTER — OFFICE VISIT (OUTPATIENT)
Dept: ORTHOPEDICS | Facility: CLINIC | Age: 75
End: 2025-05-01
Payer: COMMERCIAL

## 2025-05-01 DIAGNOSIS — M25.551 RIGHT HIP PAIN: ICD-10-CM

## 2025-05-01 DIAGNOSIS — M25.551 CHRONIC RIGHT HIP PAIN: ICD-10-CM

## 2025-05-01 DIAGNOSIS — M25.551 RIGHT HIP PAIN: Primary | ICD-10-CM

## 2025-05-01 DIAGNOSIS — G89.29 CHRONIC RIGHT HIP PAIN: ICD-10-CM

## 2025-05-01 PROCEDURE — 73502 X-RAY EXAM HIP UNI 2-3 VIEWS: CPT | Mod: TC | Performed by: RADIOLOGY

## (undated) DEVICE — ESU GROUND PAD UNIVERSAL W/O CORD

## (undated) DEVICE — SOL WATER IRRIG 1000ML BOTTLE 07139-09

## (undated) DEVICE — SOL WATER IRRIG 1000ML BOTTLE 2F7114

## (undated) DEVICE — CATH GUIDING BLUE YELLOW PTFE XB3 6FRX100CM 67005200

## (undated) DEVICE — GLOVE PROTEXIS POWDER FREE 8.0 ORTHOPEDIC 2D73ET80

## (undated) DEVICE — GLOVE PROTEXIS BLUE W/NEU-THERA 8.0  2D73EB80

## (undated) DEVICE — SU PDS II 0 TP-1 60" Z991G

## (undated) DEVICE — DEFIB PRO-PADZ LVP LQD GEL ADULT 8900-2105-01

## (undated) DEVICE — INTRODUCER GUIDEWIRE 22290

## (undated) DEVICE — BLADE KNIFE BEAVER MINI BEAVER6400

## (undated) DEVICE — PREP DURAPREP 26ML APL 8630

## (undated) DEVICE — DRAPE LAP W/ARMBOARD 29410

## (undated) DEVICE — DRSG TELFA 2X3"

## (undated) DEVICE — NDL BX BONE MARROW 11GA 6" JAMSHIDI DJ6011X

## (undated) DEVICE — CATH ANGIO INFINITI JR4 4FRX100CM 538421

## (undated) DEVICE — SYR 10ML LL W/O NDL 302995

## (undated) DEVICE — SU PROLENE 6-0 C-1DA 30" 8706H

## (undated) DEVICE — SU VICRYL 2-0 SH 27" J317H

## (undated) DEVICE — TUBING SUCTION 12"X1/4" N612

## (undated) DEVICE — SYR 05ML LL W/O NDL

## (undated) DEVICE — SOL NACL 0.9% IRRIG 1000ML BOTTLE 2F7124

## (undated) DEVICE — Device

## (undated) DEVICE — SU VICRYL 3-0 SH 27" J316H

## (undated) DEVICE — SYR BULB IRRIG DOVER 60 ML LATEX FREE 67000

## (undated) DEVICE — LINEN TOWEL PACK X5 5464

## (undated) DEVICE — MANIFOLD KIT ANGIO AUTOMATED 014613

## (undated) DEVICE — SOL WATER IRRIG 500ML BOTTLE 2F7113

## (undated) DEVICE — SYR 10ML FINGER CONTROL W/O NDL 309695

## (undated) DEVICE — SUCTION FRAZIER 12FR W/HANDLE K73

## (undated) DEVICE — SURGICEL HEMOSTAT 2X14" 1951

## (undated) DEVICE — DRAIN JACKSON PRATT RESERVOIR 100ML SU130-1305

## (undated) DEVICE — CATH BALLOON NC EMERGE 2.50X12MM H7493926712250

## (undated) DEVICE — CATH FOLEY 16FR 5ML SIL 165816

## (undated) DEVICE — GOWN XLG DISP 9545

## (undated) DEVICE — NDL SPINAL 22GA 3.5" QUINCKE 405181

## (undated) DEVICE — SYR 03ML LL W/O NDL 309657

## (undated) DEVICE — SU VICRYL 2-0 CT-2 CR 8X18" J726D

## (undated) DEVICE — SYR 10ML LL W/O NDL

## (undated) DEVICE — CATH LAUNCHER 6FR EBU 3.5 LA6EBU35

## (undated) DEVICE — SLEEVE TR BAND RADIAL COMPRESSION DEVICE 24CM TRB24-REG

## (undated) DEVICE — TOTE ANGIO CORP PC15AT SAN32CC83O

## (undated) DEVICE — DRSG TELFA ISLAND 4X10"

## (undated) DEVICE — GLOVE PROTEXIS W/NEU-THERA 7.5  2D73TE75

## (undated) DEVICE — BLADE BONE MILL STRK 3.2MM FINE 5400-702-000

## (undated) DEVICE — GW VASC OMNIWIRE J L185CM PRESSURE 89185J

## (undated) DEVICE — DRAPE MAYO STAND 23X54 8337

## (undated) DEVICE — INFL DVC BASIXCOMPAK PLYCRB 30 ATM 13IN 20ML IN4530

## (undated) DEVICE — SU VICRYL 3-0 SH 8X18" UND J864D

## (undated) DEVICE — INTRO GLIDESHEATH SLENDER 6FR 10X45CM 60-1060

## (undated) DEVICE — CATH FOLEY 18FR 5ML SIL165818

## (undated) DEVICE — DRAPE IOBAN INCISE 23X17" 6650EZ

## (undated) DEVICE — GLOVE PROTEXIS BLUE W/NEU-THERA 6.5  2D73EB65

## (undated) DEVICE — SOL NACL 0.9% INJ 250ML BAG 2B1322Q

## (undated) DEVICE — PREP CHLORAPREP 26ML TINTED ORANGE  260815

## (undated) DEVICE — ESU PENCIL W/HOLSTER E2350H

## (undated) DEVICE — DRSG BANDAID 1X3" FABRIC

## (undated) DEVICE — SUCTION TIP YANKAUER W/O VENT K86

## (undated) DEVICE — DECANTER BAG 2002S

## (undated) DEVICE — SUCTION CANISTER MEDIVAC LINER 3000ML W/LID 65651-530

## (undated) DEVICE — PACK CYSTO CUSTOM ASC

## (undated) DEVICE — SUTURE BOOTS 051003PBX

## (undated) DEVICE — RX SURGIFLO HEMOSTATIC MATRIX W/THROMBIN 8ML 2994

## (undated) DEVICE — NDL COUNTER 20CT 31142493

## (undated) DEVICE — SU PDS II 4-0 RB-1 27" Z304H

## (undated) DEVICE — JELLY LUBRICATING SURGILUBE 2OZ TUBE

## (undated) DEVICE — PACK MINOR PROCEDURE CUSTOM

## (undated) DEVICE — CATH DIAGNOSTIC RADIAL 5FR TIG 4.0

## (undated) DEVICE — DRSG STERI STRIP 1/2X4" R1547

## (undated) DEVICE — SU ETHILON 2-0 FS 18" 664H

## (undated) DEVICE — KIT HAND CONTROL ANGIOTOUCH ACIST 65CM AT-P65

## (undated) DEVICE — STPL SKIN 35W ROTATING HEAD PRW35

## (undated) DEVICE — ESU PENCIL W/HOLSTER

## (undated) DEVICE — GOWN IMPERVIOUS SPECIALTY XL/XLONG 39049

## (undated) DEVICE — NDL BLUNT 19GA 1.5"

## (undated) DEVICE — SU SILK 3-0 SH 30" K832H

## (undated) DEVICE — NDL ECLIPSE 18GA 1.5"

## (undated) DEVICE — CATH IVUS OPTICROSS HD 6 3.6FR 1.18MM DIA 135CML H7493935408

## (undated) DEVICE — VALVE HEMOSTASIS .096" COPILOT MECH 1003331

## (undated) DEVICE — SPONGE KITTNER 30-101

## (undated) DEVICE — SU MONOCRYL 4-0 PS-2 18" UND Y496G

## (undated) DEVICE — PACK AB HYST II

## (undated) DEVICE — LINEN TOWEL PACK X6 WHITE 5487

## (undated) DEVICE — STPL RELOAD LINEAR CUT 75MM TCR75

## (undated) DEVICE — VALVE HEMOSTASIS GUARDIAN II OD8 FR GUIDEWIRE 8215

## (undated) DEVICE — SPECIMEN CONTAINER 4OZ

## (undated) DEVICE — RAD G/W INQWIRE .035X260CM J-TIP EXCHANGE IQ35F260J1O5RS

## (undated) DEVICE — SUCTION MANIFOLD NEPTUNE 2 SYS 1 PORT 702-025-000

## (undated) DEVICE — PREP POVIDONE IODINE SOLUTION 10% 4OZ

## (undated) DEVICE — SU VICRYL 4-0 PS-2 18" UND J496H

## (undated) DEVICE — TRAY PROCEDURE SUPPORT PAIN MANAGEMENT 332114

## (undated) DEVICE — STPL LINEAR CUT 75MM TLC75

## (undated) DEVICE — MARKER SPHERES PASSIVE MEDT PACK 5 8801075

## (undated) DEVICE — SOL WATER INJ 2000ML BAG 07118-07

## (undated) DEVICE — VESSEL LOOPS YELLOW MAXI 31145694

## (undated) DEVICE — SU VICRYL 2-0 SH 27" UND J417H

## (undated) DEVICE — SU VICRYL 3-0 SH 27" UND J416H

## (undated) DEVICE — PREP POVIDONE IODINE SCRUB 7.5% 4OZ APL82212

## (undated) DEVICE — DRAPE SLEEVE 599

## (undated) DEVICE — SU SILK 3-0 SH CR 8X18" C013D

## (undated) DEVICE — SOL ADH LIQUID BENZOIN SWAB 0.6ML C1544

## (undated) DEVICE — SU VICRYL 2-0 UR-6 27" J602H

## (undated) DEVICE — LINEN TOWEL PACK X30 5481

## (undated) DEVICE — SU SILK 2-0 TIE 12X30" A305H

## (undated) DEVICE — NDL 19GA 1.5"

## (undated) DEVICE — SYR 30ML LL W/O NDL

## (undated) DEVICE — DRAPE POUCH INSTRUMENT 1018

## (undated) DEVICE — CLIP ETHICON LIGACLIP SM BLUE LT100

## (undated) DEVICE — SOL NACL 0.9% IRRIG 3000ML BAG 07972-08

## (undated) DEVICE — DRAPE MICROSCOPE EQUIP 48X120" 6130VL2

## (undated) DEVICE — CLIP HORIZON MED BLUE 002200

## (undated) DEVICE — KIT ENDO FIRST STEP DISINFECTANT 200ML W/POUCH EP-4

## (undated) DEVICE — MIDAS REX DISSECTING TOOL  14MH30

## (undated) DEVICE — DRSG STERI STRIP 1/4X3" R1541

## (undated) DEVICE — ESU LIGASURE IMPACT OPEN SEALER/DVDR CVD LG JAW LF4418

## (undated) DEVICE — DRSG MEDIPORE 3 1/2X13 3/4" 3573

## (undated) DEVICE — CATH BALLOON EMERGE 2.0X12MM H7493918912200

## (undated) DEVICE — IONM EEG SUPPLIES

## (undated) DEVICE — CATH BLN NC EUPHORA RX 2.5 X 8

## (undated) DEVICE — DRAPE SHEET REV FOLD 3/4 9349

## (undated) DEVICE — TUBING SUCTION SOFT 20'X3/16" 0036570

## (undated) DEVICE — COVER TABLE POLY 65X90" 8186

## (undated) DEVICE — KIT SEALER DURASEAL EXACT SP HYDROGEL 5ML SGL USE 20-6520

## (undated) DEVICE — NDL SPINAL 18GA 3.5" 405184

## (undated) DEVICE — CATH BALLOON NC EMERGE 2.75X12MM H7493926712270

## (undated) DEVICE — DRAIN JACKSON PRATT CHANNEL 19FR ROUND HUBLESS SIL JP-2230

## (undated) DEVICE — ESU ELEC NDL 1" E1552

## (undated) DEVICE — DRAPE LEGGINGS CLEAR 8430

## (undated) DEVICE — PACK LARGE SPINE SNE15LSFSE

## (undated) DEVICE — SU MONOCRYL 2-0 UR-6DA 27" D8592

## (undated) DEVICE — SPONGE SURGIFOAM 100 1974

## (undated) DEVICE — DECANTER VIAL 2006S

## (undated) DEVICE — WIRE GUIDE 0.035"X260CM SAFE-T-J EXCHANGE G00517

## (undated) DEVICE — SYR BULB IRRIG 50ML LATEX FREE 0035280

## (undated) DEVICE — DRAPE COVER C-ARM SEAMLESS SNAP-KAP 03-KP26 LATEX FREE

## (undated) DEVICE — SU CHROMIC 4-0 RB-1 27" U203H

## (undated) DEVICE — PACK VASCULAR SCV15VAFSB

## (undated) DEVICE — SU PROLENE 7-0 BV-1DA 4X30" M8703

## (undated) DEVICE — POSITIONER PT PRONESAFE HEAD REST W/DERMAPROX INSERT 40599

## (undated) DEVICE — TUBING IV EXTENSION SET 34"

## (undated) DEVICE — ESU ELEC BLADE 2.75" COATED/INSULATED E1455

## (undated) DEVICE — SU VICRYL 4-0 RB-1 27" UND J214H

## (undated) DEVICE — GUIDEWIRE VASC 0.014INX180CM RUNTHROUGH 25-1011

## (undated) DEVICE — DRAIN JACKSON PRATT 07MM FLAT SU130-1310

## (undated) DEVICE — BLADE KNIFE SURG 15 371115

## (undated) DEVICE — PACK UNIVERSAL SPLIT 29131

## (undated) DEVICE — SOL NACL 0.9% IRRIG 500ML BOTTLE 2F7123

## (undated) DEVICE — SYR 05ML SLIP TIP W/O NDL 309647

## (undated) DEVICE — PITCHER STERILE 1000ML  SSK9004A

## (undated) DEVICE — SU SILK 3-0 TIE 12X30" A304H

## (undated) DEVICE — SU VICRYL 0 CT-1 36" J346H

## (undated) DEVICE — SU VICRYL 3-0 CT-1 36" J338H

## (undated) DEVICE — IONM UP TO 7 HOURS

## (undated) DEVICE — ESU BIPOLAR SEALER AQUAMANTYS 6MM 23-112-1

## (undated) DEVICE — SU VICRYL 2-0 CT-2 27" J333H

## (undated) DEVICE — SYR 70ML TOOMEY 041170

## (undated) DEVICE — MANIFOLD NEPTUNE 4 PORT 700-20

## (undated) DEVICE — PAD CHUX UNDERPAD 30X30"

## (undated) DEVICE — KIT UROSTOMY POUCH 2 3/4" 19254

## (undated) DEVICE — SU SILK 3-0 TIE 24" SA74H

## (undated) DEVICE — SU VICRYL 0 CT-1 CR 8X18" J740D

## (undated) DEVICE — SU SILK 2-0 SH 30" K833H

## (undated) DEVICE — STPL LINEAR CUT 60X3.5MM TX60B

## (undated) DEVICE — SU SILK 2-0 TIE 24" SA75H

## (undated) DEVICE — BAG URINARY DRAIN 2000ML LF 154002

## (undated) DEVICE — ESU GROUND PAD ADULT W/CORD E7507

## (undated) DEVICE — DRAPE UNDER BUTTOCK 8483

## (undated) DEVICE — DRSG TELFA ISLAND 4X8" 7541

## (undated) DEVICE — SUCTION MANIFOLD NEPTUNE 2 SYS 4 PORT 0702-020-000

## (undated) DEVICE — PREP CHLORAPREP W/ORANGE TINT 10.5ML 930715

## (undated) DEVICE — DRAPE LAP TRANSVERSE 29421

## (undated) DEVICE — GLOVE PROTEXIS W/NEU-THERA 6.5  2D73TE65

## (undated) DEVICE — DRSG KERLIX FLUFFS X5

## (undated) DEVICE — GUIDEWIRE SENSOR DUAL FLEX STR 0.035"X150CM M0066703080

## (undated) DEVICE — NDL 20GA 1.5"

## (undated) DEVICE — KIT PATIENT JACKSON 1 SPK10118

## (undated) RX ORDER — DEXAMETHASONE SODIUM PHOSPHATE 4 MG/ML
INJECTION, SOLUTION INTRA-ARTICULAR; INTRALESIONAL; INTRAMUSCULAR; INTRAVENOUS; SOFT TISSUE
Status: DISPENSED
Start: 2020-02-04

## (undated) RX ORDER — FENTANYL CITRATE 50 UG/ML
INJECTION, SOLUTION INTRAMUSCULAR; INTRAVENOUS
Status: DISPENSED
Start: 2021-06-01

## (undated) RX ORDER — FENTANYL CITRATE 50 UG/ML
INJECTION, SOLUTION INTRAMUSCULAR; INTRAVENOUS
Status: DISPENSED
Start: 2022-06-20

## (undated) RX ORDER — PROPOFOL 10 MG/ML
INJECTION, EMULSION INTRAVENOUS
Status: DISPENSED
Start: 2020-10-08

## (undated) RX ORDER — CEFAZOLIN SODIUM 1 G/3ML
INJECTION, POWDER, FOR SOLUTION INTRAMUSCULAR; INTRAVENOUS
Status: DISPENSED
Start: 2021-06-01

## (undated) RX ORDER — EPHEDRINE SULFATE 50 MG/ML
INJECTION, SOLUTION INTRAMUSCULAR; INTRAVENOUS; SUBCUTANEOUS
Status: DISPENSED
Start: 2021-06-01

## (undated) RX ORDER — METOPROLOL TARTRATE 1 MG/ML
INJECTION, SOLUTION INTRAVENOUS
Status: DISPENSED
Start: 2021-06-01

## (undated) RX ORDER — ONDANSETRON 2 MG/ML
INJECTION INTRAMUSCULAR; INTRAVENOUS
Status: DISPENSED
Start: 2020-02-04

## (undated) RX ORDER — LIDOCAINE HYDROCHLORIDE 20 MG/ML
INJECTION, SOLUTION EPIDURAL; INFILTRATION; INTRACAUDAL; PERINEURAL
Status: DISPENSED
Start: 2020-02-04

## (undated) RX ORDER — NITROGLYCERIN 5 MG/ML
VIAL (ML) INTRAVENOUS
Status: DISPENSED
Start: 2024-06-13

## (undated) RX ORDER — ACETAMINOPHEN 500 MG
TABLET ORAL
Status: DISPENSED
Start: 2024-04-23

## (undated) RX ORDER — FENTANYL CITRATE 50 UG/ML
INJECTION, SOLUTION INTRAMUSCULAR; INTRAVENOUS
Status: DISPENSED
Start: 2017-12-22

## (undated) RX ORDER — HYDROMORPHONE HYDROCHLORIDE 1 MG/ML
INJECTION, SOLUTION INTRAMUSCULAR; INTRAVENOUS; SUBCUTANEOUS
Status: DISPENSED
Start: 2020-02-04

## (undated) RX ORDER — METOPROLOL TARTRATE 1 MG/ML
INJECTION, SOLUTION INTRAVENOUS
Status: DISPENSED
Start: 2024-04-23

## (undated) RX ORDER — HYDROMORPHONE HYDROCHLORIDE 1 MG/ML
INJECTION, SOLUTION INTRAMUSCULAR; INTRAVENOUS; SUBCUTANEOUS
Status: DISPENSED
Start: 2020-10-08

## (undated) RX ORDER — FENTANYL CITRATE-0.9 % NACL/PF 10 MCG/ML
PLASTIC BAG, INJECTION (ML) INTRAVENOUS
Status: DISPENSED
Start: 2021-06-01

## (undated) RX ORDER — CEFAZOLIN SODIUM 1 G/3ML
INJECTION, POWDER, FOR SOLUTION INTRAMUSCULAR; INTRAVENOUS
Status: DISPENSED
Start: 2021-03-18

## (undated) RX ORDER — LABETALOL HYDROCHLORIDE 5 MG/ML
INJECTION, SOLUTION INTRAVENOUS
Status: DISPENSED
Start: 2020-10-08

## (undated) RX ORDER — DEXAMETHASONE SODIUM PHOSPHATE 4 MG/ML
INJECTION, SOLUTION INTRA-ARTICULAR; INTRALESIONAL; INTRAMUSCULAR; INTRAVENOUS; SOFT TISSUE
Status: DISPENSED
Start: 2021-10-27

## (undated) RX ORDER — FENTANYL CITRATE 50 UG/ML
INJECTION, SOLUTION INTRAMUSCULAR; INTRAVENOUS
Status: DISPENSED
Start: 2020-10-08

## (undated) RX ORDER — ONDANSETRON 2 MG/ML
INJECTION INTRAMUSCULAR; INTRAVENOUS
Status: DISPENSED
Start: 2021-10-27

## (undated) RX ORDER — LIDOCAINE HYDROCHLORIDE 10 MG/ML
INJECTION, SOLUTION EPIDURAL; INFILTRATION; INTRACAUDAL; PERINEURAL
Status: DISPENSED
Start: 2024-06-13

## (undated) RX ORDER — FENTANYL CITRATE 50 UG/ML
INJECTION, SOLUTION INTRAMUSCULAR; INTRAVENOUS
Status: DISPENSED
Start: 2021-10-27

## (undated) RX ORDER — VERAPAMIL HYDROCHLORIDE 2.5 MG/ML
INJECTION, SOLUTION INTRAVENOUS
Status: DISPENSED
Start: 2024-06-13

## (undated) RX ORDER — IOPAMIDOL 612 MG/ML
INJECTION, SOLUTION INTRATHECAL
Status: DISPENSED
Start: 2018-02-21

## (undated) RX ORDER — BUPIVACAINE HYDROCHLORIDE AND EPINEPHRINE 5; 5 MG/ML; UG/ML
INJECTION, SOLUTION EPIDURAL; INTRACAUDAL; PERINEURAL
Status: DISPENSED
Start: 2020-02-04

## (undated) RX ORDER — HEPARIN SODIUM (PORCINE) LOCK FLUSH IV SOLN 100 UNIT/ML 100 UNIT/ML
SOLUTION INTRAVENOUS
Status: DISPENSED
Start: 2021-10-27

## (undated) RX ORDER — BUPIVACAINE HYDROCHLORIDE 2.5 MG/ML
INJECTION, SOLUTION EPIDURAL; INFILTRATION; INTRACAUDAL
Status: DISPENSED
Start: 2021-10-27

## (undated) RX ORDER — HEPARIN SODIUM 200 [USP'U]/100ML
INJECTION, SOLUTION INTRAVENOUS
Status: DISPENSED
Start: 2024-04-23

## (undated) RX ORDER — REMIFENTANIL HYDROCHLORIDE 1 MG/ML
INJECTION, POWDER, LYOPHILIZED, FOR SOLUTION INTRAVENOUS
Status: DISPENSED
Start: 2020-10-08

## (undated) RX ORDER — FENTANYL CITRATE 50 UG/ML
INJECTION, SOLUTION INTRAMUSCULAR; INTRAVENOUS
Status: DISPENSED
Start: 2024-06-13

## (undated) RX ORDER — PROPOFOL 10 MG/ML
INJECTION, EMULSION INTRAVENOUS
Status: DISPENSED
Start: 2020-02-04

## (undated) RX ORDER — FENTANYL CITRATE 50 UG/ML
INJECTION, SOLUTION INTRAMUSCULAR; INTRAVENOUS
Status: DISPENSED
Start: 2024-04-23

## (undated) RX ORDER — CEFAZOLIN SODIUM 1 G/3ML
INJECTION, POWDER, FOR SOLUTION INTRAMUSCULAR; INTRAVENOUS
Status: DISPENSED
Start: 2020-02-04

## (undated) RX ORDER — CEFAZOLIN SODIUM 1 G/3ML
INJECTION, POWDER, FOR SOLUTION INTRAMUSCULAR; INTRAVENOUS
Status: DISPENSED
Start: 2020-10-08

## (undated) RX ORDER — LIDOCAINE HYDROCHLORIDE 10 MG/ML
INJECTION, SOLUTION INFILTRATION; PERINEURAL
Status: DISPENSED
Start: 2018-02-14

## (undated) RX ORDER — LIDOCAINE HYDROCHLORIDE 10 MG/ML
INJECTION, SOLUTION EPIDURAL; INFILTRATION; INTRACAUDAL; PERINEURAL
Status: DISPENSED
Start: 2024-04-23

## (undated) RX ORDER — HEPARIN SODIUM (PORCINE) LOCK FLUSH IV SOLN 100 UNIT/ML 100 UNIT/ML
SOLUTION INTRAVENOUS
Status: DISPENSED
Start: 2021-09-21

## (undated) RX ORDER — ERTAPENEM 1 G/1
INJECTION, POWDER, LYOPHILIZED, FOR SOLUTION INTRAMUSCULAR; INTRAVENOUS
Status: DISPENSED
Start: 2021-06-01

## (undated) RX ORDER — ESMOLOL HYDROCHLORIDE 10 MG/ML
INJECTION INTRAVENOUS
Status: DISPENSED
Start: 2020-10-08

## (undated) RX ORDER — FENTANYL CITRATE 50 UG/ML
INJECTION, SOLUTION INTRAMUSCULAR; INTRAVENOUS
Status: DISPENSED
Start: 2018-02-14

## (undated) RX ORDER — VERAPAMIL HYDROCHLORIDE 2.5 MG/ML
INJECTION, SOLUTION INTRAVENOUS
Status: DISPENSED
Start: 2024-04-23

## (undated) RX ORDER — FENTANYL CITRATE 50 UG/ML
INJECTION, SOLUTION INTRAMUSCULAR; INTRAVENOUS
Status: DISPENSED
Start: 2021-03-18

## (undated) RX ORDER — HEPARIN SODIUM 1000 [USP'U]/ML
INJECTION, SOLUTION INTRAVENOUS; SUBCUTANEOUS
Status: DISPENSED
Start: 2024-06-13

## (undated) RX ORDER — ACETAMINOPHEN 325 MG/1
TABLET ORAL
Status: DISPENSED
Start: 2021-06-01

## (undated) RX ORDER — DIPHENHYDRAMINE HYDROCHLORIDE 50 MG/ML
INJECTION INTRAMUSCULAR; INTRAVENOUS
Status: DISPENSED
Start: 2022-06-20

## (undated) RX ORDER — ONDANSETRON 2 MG/ML
INJECTION INTRAMUSCULAR; INTRAVENOUS
Status: DISPENSED
Start: 2021-06-01

## (undated) RX ORDER — DEXAMETHASONE SODIUM PHOSPHATE 10 MG/ML
INJECTION, SOLUTION INTRAMUSCULAR; INTRAVENOUS
Status: DISPENSED
Start: 2018-02-21

## (undated) RX ORDER — FENTANYL CITRATE 50 UG/ML
INJECTION, SOLUTION INTRAMUSCULAR; INTRAVENOUS
Status: DISPENSED
Start: 2021-04-16

## (undated) RX ORDER — CEFAZOLIN SODIUM 2 G/100ML
INJECTION, SOLUTION INTRAVENOUS
Status: DISPENSED
Start: 2020-02-04

## (undated) RX ORDER — ACETAMINOPHEN 325 MG/1
TABLET ORAL
Status: DISPENSED
Start: 2021-10-27

## (undated) RX ORDER — BUPIVACAINE HYDROCHLORIDE 5 MG/ML
INJECTION, SOLUTION EPIDURAL; INTRACAUDAL
Status: DISPENSED
Start: 2018-02-21

## (undated) RX ORDER — PROPOFOL 10 MG/ML
INJECTION, EMULSION INTRAVENOUS
Status: DISPENSED
Start: 2021-10-27

## (undated) RX ORDER — HEPARIN SODIUM (PORCINE) LOCK FLUSH IV SOLN 100 UNIT/ML 100 UNIT/ML
SOLUTION INTRAVENOUS
Status: DISPENSED
Start: 2021-04-16

## (undated) RX ORDER — NEOSTIGMINE METHYLSULFATE 1 MG/ML
VIAL (ML) INJECTION
Status: DISPENSED
Start: 2020-10-08

## (undated) RX ORDER — SODIUM CHLORIDE 9 MG/ML
INJECTION, SOLUTION INTRAVENOUS
Status: DISPENSED
Start: 2021-06-01

## (undated) RX ORDER — BUPIVACAINE HYDROCHLORIDE 2.5 MG/ML
INJECTION, SOLUTION EPIDURAL; INFILTRATION; INTRACAUDAL
Status: DISPENSED
Start: 2018-02-14

## (undated) RX ORDER — OXYCODONE HYDROCHLORIDE 5 MG/1
TABLET ORAL
Status: DISPENSED
Start: 2021-03-18

## (undated) RX ORDER — ACETAMINOPHEN 325 MG/1
TABLET ORAL
Status: DISPENSED
Start: 2021-03-18

## (undated) RX ORDER — ALBUMIN, HUMAN INJ 5% 5 %
SOLUTION INTRAVENOUS
Status: DISPENSED
Start: 2020-02-04

## (undated) RX ORDER — GLYCOPYRROLATE 0.2 MG/ML
INJECTION, SOLUTION INTRAMUSCULAR; INTRAVENOUS
Status: DISPENSED
Start: 2020-10-08

## (undated) RX ORDER — ESMOLOL HYDROCHLORIDE 10 MG/ML
INJECTION INTRAVENOUS
Status: DISPENSED
Start: 2021-06-01

## (undated) RX ORDER — FENTANYL CITRATE 50 UG/ML
INJECTION, SOLUTION INTRAMUSCULAR; INTRAVENOUS
Status: DISPENSED
Start: 2020-02-04

## (undated) RX ORDER — ACETAMINOPHEN 325 MG/1
TABLET ORAL
Status: DISPENSED
Start: 2020-02-04

## (undated) RX ORDER — HEPARIN SODIUM 5000 [USP'U]/.5ML
INJECTION, SOLUTION INTRAVENOUS; SUBCUTANEOUS
Status: DISPENSED
Start: 2021-06-01

## (undated) RX ORDER — CLOPIDOGREL 300 MG/1
TABLET, FILM COATED ORAL
Status: DISPENSED
Start: 2024-04-23

## (undated) RX ORDER — HYDROMORPHONE HYDROCHLORIDE 1 MG/ML
INJECTION, SOLUTION INTRAMUSCULAR; INTRAVENOUS; SUBCUTANEOUS
Status: DISPENSED
Start: 2021-06-01

## (undated) RX ORDER — HEPARIN SODIUM (PORCINE) LOCK FLUSH IV SOLN 100 UNIT/ML 100 UNIT/ML
SOLUTION INTRAVENOUS
Status: DISPENSED
Start: 2022-06-20

## (undated) RX ORDER — ONDANSETRON 2 MG/ML
INJECTION INTRAMUSCULAR; INTRAVENOUS
Status: DISPENSED
Start: 2020-10-08

## (undated) RX ORDER — LIDOCAINE HYDROCHLORIDE 20 MG/ML
INJECTION, SOLUTION EPIDURAL; INFILTRATION; INTRACAUDAL; PERINEURAL
Status: DISPENSED
Start: 2021-10-27

## (undated) RX ORDER — PROPOFOL 10 MG/ML
INJECTION, EMULSION INTRAVENOUS
Status: DISPENSED
Start: 2018-02-14

## (undated) RX ORDER — GLYCOPYRROLATE 0.2 MG/ML
INJECTION, SOLUTION INTRAMUSCULAR; INTRAVENOUS
Status: DISPENSED
Start: 2020-02-04

## (undated) RX ORDER — CEFAZOLIN SODIUM 1 G/3ML
INJECTION, POWDER, FOR SOLUTION INTRAMUSCULAR; INTRAVENOUS
Status: DISPENSED
Start: 2021-10-27

## (undated) RX ORDER — EPINEPHRINE 1 MG/ML
INJECTION, SOLUTION, CONCENTRATE INTRAVENOUS
Status: DISPENSED
Start: 2018-02-14

## (undated) RX ORDER — NITROGLYCERIN 5 MG/ML
VIAL (ML) INTRAVENOUS
Status: DISPENSED
Start: 2024-04-23

## (undated) RX ORDER — DEXAMETHASONE SODIUM PHOSPHATE 4 MG/ML
INJECTION, SOLUTION INTRA-ARTICULAR; INTRALESIONAL; INTRAMUSCULAR; INTRAVENOUS; SOFT TISSUE
Status: DISPENSED
Start: 2021-06-01

## (undated) RX ORDER — GABAPENTIN 300 MG/1
CAPSULE ORAL
Status: DISPENSED
Start: 2021-06-01

## (undated) RX ORDER — GABAPENTIN 100 MG/1
CAPSULE ORAL
Status: DISPENSED
Start: 2021-10-27

## (undated) RX ORDER — NEOSTIGMINE METHYLSULFATE 1 MG/ML
VIAL (ML) INJECTION
Status: DISPENSED
Start: 2020-02-04

## (undated) RX ORDER — LIDOCAINE HYDROCHLORIDE 20 MG/ML
INJECTION, SOLUTION EPIDURAL; INFILTRATION; INTRACAUDAL; PERINEURAL
Status: DISPENSED
Start: 2021-06-01

## (undated) RX ORDER — LIDOCAINE HYDROCHLORIDE 20 MG/ML
INJECTION, SOLUTION EPIDURAL; INFILTRATION; INTRACAUDAL; PERINEURAL
Status: DISPENSED
Start: 2020-10-08

## (undated) RX ORDER — HEPARIN SODIUM (PORCINE) LOCK FLUSH IV SOLN 100 UNIT/ML 100 UNIT/ML
SOLUTION INTRAVENOUS
Status: DISPENSED
Start: 2024-04-23

## (undated) RX ORDER — MINERAL OIL
OIL (ML) MISCELLANEOUS
Status: DISPENSED
Start: 2021-06-01

## (undated) RX ORDER — LIDOCAINE HYDROCHLORIDE 20 MG/ML
INJECTION, SOLUTION EPIDURAL; INFILTRATION; INTRACAUDAL; PERINEURAL
Status: DISPENSED
Start: 2018-02-14

## (undated) RX ORDER — GLYCOPYRROLATE 0.2 MG/ML
INJECTION, SOLUTION INTRAMUSCULAR; INTRAVENOUS
Status: DISPENSED
Start: 2021-06-01

## (undated) RX ORDER — DEXAMETHASONE SODIUM PHOSPHATE 4 MG/ML
INJECTION, SOLUTION INTRA-ARTICULAR; INTRALESIONAL; INTRAMUSCULAR; INTRAVENOUS; SOFT TISSUE
Status: DISPENSED
Start: 2020-10-08

## (undated) RX ORDER — BALANCED SALT SOLUTION 6.4; .75; .48; .3; 3.9; 1.7 MG/ML; MG/ML; MG/ML; MG/ML; MG/ML; MG/ML
SOLUTION OPHTHALMIC
Status: DISPENSED
Start: 2021-10-27

## (undated) RX ORDER — CEFAZOLIN SODIUM 2 G/100ML
INJECTION, SOLUTION INTRAVENOUS
Status: DISPENSED
Start: 2021-04-16

## (undated) RX ORDER — ASPIRIN 600 MG/1
SUPPOSITORY RECTAL
Status: DISPENSED
Start: 2020-10-08

## (undated) RX ORDER — LIDOCAINE HYDROCHLORIDE 10 MG/ML
INJECTION, SOLUTION INFILTRATION; PERINEURAL
Status: DISPENSED
Start: 2021-04-16

## (undated) RX ORDER — METHYLPREDNISOLONE ACETATE 40 MG/ML
INJECTION, SUSPENSION INTRA-ARTICULAR; INTRALESIONAL; INTRAMUSCULAR; SOFT TISSUE
Status: DISPENSED
Start: 2018-02-21

## (undated) RX ORDER — PROPOFOL 10 MG/ML
INJECTION, EMULSION INTRAVENOUS
Status: DISPENSED
Start: 2021-06-01

## (undated) RX ORDER — HEPARIN SODIUM 1000 [USP'U]/ML
INJECTION, SOLUTION INTRAVENOUS; SUBCUTANEOUS
Status: DISPENSED
Start: 2024-04-23

## (undated) RX ORDER — HEPARIN SODIUM 200 [USP'U]/100ML
INJECTION, SOLUTION INTRAVENOUS
Status: DISPENSED
Start: 2024-06-13

## (undated) RX ORDER — CEFAZOLIN SODIUM 2 G/100ML
INJECTION, SOLUTION INTRAVENOUS
Status: DISPENSED
Start: 2020-10-08

## (undated) RX ORDER — HYDRALAZINE HYDROCHLORIDE 20 MG/ML
INJECTION INTRAMUSCULAR; INTRAVENOUS
Status: DISPENSED
Start: 2024-04-23

## (undated) RX ORDER — GABAPENTIN 100 MG/1
CAPSULE ORAL
Status: DISPENSED
Start: 2020-02-04

## (undated) RX ORDER — EPINEPHRINE 1 MG/ML
INJECTION, SOLUTION INTRAMUSCULAR; SUBCUTANEOUS
Status: DISPENSED
Start: 2021-10-27